# Patient Record
Sex: MALE | Race: WHITE | NOT HISPANIC OR LATINO | Employment: OTHER | URBAN - NONMETROPOLITAN AREA
[De-identification: names, ages, dates, MRNs, and addresses within clinical notes are randomized per-mention and may not be internally consistent; named-entity substitution may affect disease eponyms.]

---

## 2017-01-04 ENCOUNTER — OFFICE VISIT (OUTPATIENT)
Dept: PODIATRY | Facility: CLINIC | Age: 57
End: 2017-01-04

## 2017-01-04 VITALS — BODY MASS INDEX: 32.88 KG/M2 | WEIGHT: 270 LBS | HEIGHT: 76 IN

## 2017-01-04 DIAGNOSIS — M20.32 HALLUX HAMMERTOE, LEFT: ICD-10-CM

## 2017-01-04 DIAGNOSIS — L03.116 CELLULITIS OF LEFT LEG: ICD-10-CM

## 2017-01-04 DIAGNOSIS — M20.42 HAMMER TOE OF LEFT FOOT: ICD-10-CM

## 2017-01-04 DIAGNOSIS — M20.11 HALLUX VALGUS, RIGHT: ICD-10-CM

## 2017-01-04 DIAGNOSIS — L97.522 CHRONIC ULCER OF GREAT TOE OF LEFT FOOT WITH FAT LAYER EXPOSED (HCC): Primary | ICD-10-CM

## 2017-01-04 PROCEDURE — 11042 DBRDMT SUBQ TIS 1ST 20SQCM/<: CPT | Performed by: PODIATRIST

## 2017-01-04 PROCEDURE — 99214 OFFICE O/P EST MOD 30 MIN: CPT | Performed by: PODIATRIST

## 2017-01-04 RX ORDER — CLINDAMYCIN HYDROCHLORIDE 300 MG/1
600 CAPSULE ORAL 3 TIMES DAILY
Qty: 60 CAPSULE | Refills: 0 | Status: SHIPPED | OUTPATIENT
Start: 2017-01-04 | End: 2017-03-09 | Stop reason: HOSPADM

## 2017-01-04 NOTE — MR AVS SNAPSHOT
Emre Lisa   1/4/2017 8:30 AM   Office Visit    Dept Phone:  [phone]   Encounter #:  [id number]    Provider:  Marco A Thompson DPM   Department:  [facility] PODIATRY                Your Full Care Plan              Your Updated Medication List          This list is accurate as of: 1/4/17  9:08 AM.  Always use your most recent med list.                cephalexin 500 MG capsule   Commonly known as:  KEFLEX   Take 1 capsule by mouth 4 (Four) Times a Day.       doxycycline 100 MG capsule   Commonly known as:  VIBRAMYCIN   Take 1 capsule by mouth 2 (Two) Times a Day.       fluticasone 50 MCG/ACT nasal spray   Commonly known as:  FLONASE       FLUVIRIN 0.5 ML suspension prefilled syringe injection   Generic drug:  Influenza Vac Typ A&B Surf Ant       glimepiride 2 MG tablet   Commonly known as:  AMARYL       magnesium oxide 400 (241.3 MG) MG tablet tablet   Commonly known as:  MAGOX       metFORMIN 1000 MG tablet   Commonly known as:  GLUCOPHAGE       metoprolol tartrate 25 MG tablet   Commonly known as:  LOPRESSOR       MULTAQ 400 MG tablet   Generic drug:  dronedarone       mupirocin 2 % ointment   Commonly known as:  BACTROBAN       ONE TOUCH ULTRA TEST test strip   Generic drug:  glucose blood       valsartan 160 MG tablet   Commonly known as:  DIOVAN               Instructions     None    Patient Instructions History      Upcoming Appointments     Visit Type Date Time Department    POST-OP 1/4/2017  8:30 AM [facility]    POST-OP 1/11/2017  8:00 AM [facility]      [employer] Signup     [facility] allows you to send messages to your doctor, view your test results, renew your prescriptions, schedule appointments, and more. To sign up, go to [employer] and click on the Sign Up Now link in the New User? box. Enter your [employer] Activation Code exactly as it appears below along with the last four digits of your Social  "Security Number and your Date of Birth () to complete the sign-up process. If you do not sign up before the expiration date, you must request a new code.    [facility] Activation Code: [email or web address]  Expires: 2017  9:21 AM    If you have questions, you can email [email or web address] or call [phone] to talk to our [facility] staff. Remember, [facility] is NOT to be used for urgent needs. For medical emergencies, dial 911.               Other Info from Your Visit           Your Appointments     2017  8:00 AM CST   Post-Op with Marco A Thompson DPM   [facility] (--)    [address]  [facility] [place] [address] [place] [id number] [phone]              Allergies     Januvia [Sitagliptin]      Lipitor [Atorvastatin]      Peanut-containing Drug Products      Penicillins      Sulfa Antibiotics        Reason for Visit     Left Foot - Wound Check, Follow-up     Right Foot - post op karyn           Vital Signs     Height Weight Body Mass Index Smoking Status          76\" (193 cm) 270 lb (122 kg) 32.87 kg/m2 Never Smoker          "

## 2017-01-04 NOTE — PROGRESS NOTES
Emre Lisa  1960  56 y.o. male   Patient presents today for a post op recheck of his right foot and wound care recheck of his left great toe.   Patient has been on antibiotics since 12/30/2016 1/4/2017  Chief Complaint   Patient presents with   • Left Foot - Wound Check, Follow-up   • Right Foot - post op karyn           History of Present Illness    Patient presents to clinic today for follow-up.  Previous amputation site on the right foot is completely healed with no issues.  He continues to dress the ulcer on his left great toe daily.    It is not painful.  He has a new complaint today of cellulitis on his left leg. This is a ongoing problem for this patient. States that he has had cellulitis to his legs off and on for the last couple of years. He has no other pedal complaints at this time.         No past medical history on file.      No past surgical history on file.      No family history on file.      Social History     Social History   • Marital status:      Spouse name: N/A   • Number of children: N/A   • Years of education: N/A     Occupational History   • Not on file.     Social History Main Topics   • Smoking status: Never Smoker   • Smokeless tobacco: Not on file   • Alcohol use Yes      Comment: socially   • Drug use: No   • Sexual activity: Not on file     Other Topics Concern   • Not on file     Social History Narrative         Current Outpatient Prescriptions   Medication Sig Dispense Refill   • cephalexin (KEFLEX) 500 MG capsule Take 1 capsule by mouth 4 (Four) Times a Day. 40 capsule 0   • doxycycline (VIBRAMYCIN) 100 MG capsule Take 1 capsule by mouth 2 (Two) Times a Day. 20 capsule 0   • fluticasone (FLONASE) 50 MCG/ACT nasal spray INSTILL 2 SPRAYS IN NOSTRILS TWICE DAILY AS NEEDED FOR RHINITIS  3   • FLUVIRIN 0.5 ML suspension prefilled syringe injection      • glimepiride (AMARYL) 2 MG tablet      • magnesium oxide (MAGOX) 400 (241.3 MG) MG tablet tablet TAKE 1  "TABLET BY MOUTH 2 (TWO) TIMES DAILY.  1   • metFORMIN (GLUCOPHAGE) 1000 MG tablet TAKE 1 TABLET BY MOUTH 2 (TWO) TIMES DAILY WITH MEALS. INDICATIONS: TYPE 2 DIABETES MELLITUS  0   • metoprolol tartrate (LOPRESSOR) 25 MG tablet TAKE 1 TABLET BY MOUTH 2 (TWO) TIMES DAILY.  1   • MULTAQ 400 MG tablet TAKE 1 TABLET BY MOUTH EVERY DAY  1   • mupirocin (BACTROBAN) 2 % ointment      • ONE TOUCH ULTRA TEST test strip USE TO TEST BLOOD SUGAR THREE TIMES A DAY  1   • valsartan (DIOVAN) 160 MG tablet TAKE 1 TABLET BY MOUTH EVERY DAY  0   • clindamycin (CLEOCIN) 300 MG capsule Take 2 capsules by mouth 3 (Three) Times a Day. 60 capsule 0     No current facility-administered medications for this visit.            OBJECTIVE    Visit Vitals   • Ht 76\" (193 cm)   • Wt 270 lb (122 kg)   • BMI 32.87 kg/m2       Review of Systems   Constitutional: Negative for chills and fever.   Cardiovascular: Negative for chest pain.   Gastrointestinal: Negative for constipation, diarrhea, nausea and vomiting.   Skin: ulcer left great toe, cellulitis left leg  Musculoskeletal: negative foot pain      Constitutional: well developed, well nourished    HEENT: Normocephalic and atraumatic, normal hearing    Respiratory: Non labored respirations noted    Cardiovascular:    DP pulses palpable . Non palpable PT pulses  CFT brisk  to all digits  Skin temp is warm to warm from proximal tibia to distal digits  Pedal hair growth absent.   Erythema noted to the middle 1/3 of the left anterior leg.     Musculoskeletal:  Completely healed incision site noted to right fourth digit amputation site  Amputated second fourth and fifth digits on the right foot.  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  Contracted right third digit  Hallux malleus noted left.  Rigidly contracted digits 2 through 5 left  No pain on palpation noted    Rectus foot type     Dermatological:   Ulcer noted to the distal tip of the left hallux.  Ulcer measures 0.2 x 0.2 x 0.2 cm.  There is a " hyperkeratotic rim.  Does not probe to bone.  No foul odor or purulent drainage noted.  No periwound erythema.  Webspaces 1-4 bilateral are clean, dry and intact.   No subcutaneous nodules or masses noted        Neurological:   Protective sensation absent  Sensation intact to light touch        Psychiatric: A&O x 3 with normal mood and affect. NAD.           Procedures        ASSESSMENT AND PLAN    Emre was seen today for wound check, follow-up and post op karyn.    Diagnoses and all orders for this visit:    Chronic ulcer of great toe of left foot with fat layer exposed    Hallux valgus, right    Hallux hammertoe, left    Hammer toe of left foot    Cellulitis of left leg    Other orders  -     clindamycin (CLEOCIN) 300 MG capsule; Take 2 capsules by mouth 3 (Three) Times a Day.    Rx for clindamycin for cellulitis  Continue keflex  Ulcer to left great toe sharply debrided down to healthy bleeding tissue with a #15 blade.  Post-debridement measurements were 0.3 x 0.3 x 0.3 cm.  Healthy bleeding tissue noted.  Ulcer dressed with medihoney and a band-aid  Plan is for IPJ fusion of the left hallux in the future vs flexor tenotomy of the left hallux  Patient is in agreement with the current treatment plan.  All his questions were answered to his satisfaction.  Return to clinic in 1 week    Marco A Thompson DPM  1/5/2017  2:02 PM              This document has been electronically signed by Marco A Thompson DPM on January 5, 2017 2:02 PM

## 2017-01-18 ENCOUNTER — OFFICE VISIT (OUTPATIENT)
Dept: PODIATRY | Facility: CLINIC | Age: 57
End: 2017-01-18

## 2017-01-18 VITALS — BODY MASS INDEX: 32.88 KG/M2 | HEIGHT: 76 IN | WEIGHT: 270 LBS

## 2017-01-18 DIAGNOSIS — M20.32 HALLUX HAMMERTOE, LEFT: ICD-10-CM

## 2017-01-18 DIAGNOSIS — L97.522 CHRONIC ULCER OF GREAT TOE OF LEFT FOOT WITH FAT LAYER EXPOSED (HCC): Primary | ICD-10-CM

## 2017-01-18 DIAGNOSIS — M20.42 HAMMER TOE OF LEFT FOOT: ICD-10-CM

## 2017-01-18 DIAGNOSIS — M20.11 HALLUX VALGUS, RIGHT: ICD-10-CM

## 2017-01-18 DIAGNOSIS — L03.116 CELLULITIS OF LEFT LEG: ICD-10-CM

## 2017-01-18 PROCEDURE — 11042 DBRDMT SUBQ TIS 1ST 20SQCM/<: CPT | Performed by: PODIATRIST

## 2017-01-18 PROCEDURE — 99213 OFFICE O/P EST LOW 20 MIN: CPT | Performed by: PODIATRIST

## 2017-01-18 RX ORDER — CEPHALEXIN 500 MG/1
500 CAPSULE ORAL 4 TIMES DAILY
Qty: 40 CAPSULE | Refills: 0 | Status: SHIPPED | OUTPATIENT
Start: 2017-01-18 | End: 2017-02-15 | Stop reason: SDUPTHER

## 2017-01-18 NOTE — PROGRESS NOTES
Emre Lisa  1960  56 y.o. male   BS: 77 this morning per patient.       01/18/17    Chief Complaint   Patient presents with   • Left Foot - Follow-up, Wound Check   • Right Foot - Follow-up, Post-op           History of Present Illness    Patient presents to clinic today for follow-up of his great toe ulcer.  Has been dressing it daily with medihoney and a Band-Aid.  He has no pain.  States that he has been off antibiotics for a couple days now and he believes the cellulitis to be coming back to his anterior shin.  He denies nausea, vomiting, fever, chills, night sweats or chest pain.      No past medical history on file.      No past surgical history on file.      No family history on file.      Social History     Social History   • Marital status:      Spouse name: N/A   • Number of children: N/A   • Years of education: N/A     Occupational History   • Not on file.     Social History Main Topics   • Smoking status: Never Smoker   • Smokeless tobacco: Not on file   • Alcohol use Yes      Comment: socially   • Drug use: No   • Sexual activity: Not on file     Other Topics Concern   • Not on file     Social History Narrative         Current Outpatient Prescriptions   Medication Sig Dispense Refill   • fluticasone (FLONASE) 50 MCG/ACT nasal spray INSTILL 2 SPRAYS IN NOSTRILS TWICE DAILY AS NEEDED FOR RHINITIS  3   • FLUVIRIN 0.5 ML suspension prefilled syringe injection      • glimepiride (AMARYL) 2 MG tablet      • magnesium oxide (MAGOX) 400 (241.3 MG) MG tablet tablet TAKE 1 TABLET BY MOUTH 2 (TWO) TIMES DAILY.  1   • metFORMIN (GLUCOPHAGE) 1000 MG tablet TAKE 1 TABLET BY MOUTH 2 (TWO) TIMES DAILY WITH MEALS. INDICATIONS: TYPE 2 DIABETES MELLITUS  0   • metoprolol tartrate (LOPRESSOR) 25 MG tablet TAKE 1 TABLET BY MOUTH 2 (TWO) TIMES DAILY.  1   • MULTAQ 400 MG tablet TAKE 1 TABLET BY MOUTH EVERY DAY  1   • mupirocin (BACTROBAN) 2 % ointment      • ONE TOUCH ULTRA TEST test strip USE TO TEST  "BLOOD SUGAR THREE TIMES A DAY  1   • valsartan (DIOVAN) 160 MG tablet TAKE 1 TABLET BY MOUTH EVERY DAY  0   • cephalexin (KEFLEX) 500 MG capsule Take 1 capsule by mouth 4 (Four) Times a Day. 40 capsule 0   • clindamycin (CLEOCIN) 300 MG capsule Take 2 capsules by mouth 3 (Three) Times a Day. 60 capsule 0   • doxycycline (VIBRAMYCIN) 100 MG capsule Take 1 capsule by mouth 2 (Two) Times a Day. 20 capsule 0     No current facility-administered medications for this visit.            OBJECTIVE    Visit Vitals   • Ht 76\" (193 cm)   • Wt 270 lb (122 kg)   • BMI 32.87 kg/m2       Review of Systems   Constitutional: Negative for chills and fever.   Cardiovascular: Negative for chest pain.   Gastrointestinal: Negative for constipation, diarrhea, nausea and vomiting.   Skin: ulcer left great toe, cellulitis left leg  Musculoskeletal: negative foot pain      Constitutional: well developed, well nourished    HEENT: Normocephalic and atraumatic, normal hearing    Respiratory: Non labored respirations noted    Cardiovascular:    DP pulses palpable . Non palpable PT pulses  CFT brisk  to all digits  Skin temp is warm to warm from proximal tibia to distal digits  Pedal hair growth absent.   Erythema noted to the middle 1/3 of the left anterior leg.     Musculoskeletal:  Completely healed incision site noted to right fourth digit amputation site  Amputated second fourth and fifth digits on the right foot.  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  Contracted right third digit  Hallux malleus noted left.  Rigidly contracted digits 2 through 5 left  No pain on palpation noted    Rectus foot type     Dermatological:   Ulcer noted to the distal tip of the left hallux.  Ulcer measures 0.2 x 0.1 x 0.2 cm.  There is a hyperkeratotic rim.  Does not probe to bone.  No foul odor or purulent drainage noted.  No periwound erythema.  Webspaces 1-4 bilateral are clean, dry and intact.   No subcutaneous nodules or masses noted        Neurological: "   Protective sensation absent  Sensation intact to light touch        Psychiatric: A&O x 3 with normal mood and affect. NAD.           Procedures        ASSESSMENT AND PLAN    Emre was seen today for follow-up, wound check, follow-up and post-op.    Diagnoses and all orders for this visit:    Chronic ulcer of great toe of left foot with fat layer exposed    Hallux valgus, right    Hallux hammertoe, left    Hammer toe of left foot    Cellulitis of left leg    Other orders  -     cephalexin (KEFLEX) 500 MG capsule; Take 1 capsule by mouth 4 (Four) Times a Day.      Rx for keflex  Ulcer to left great toe sharply debrided down to healthy bleeding tissue with a #15 blade.  Post-debridement measurements were 0.4 x 0.1 x 0.2 cm.  Healthy bleeding tissue noted.  Ulcer dressed with medihoney and a band-aid  Plan is for flexor tenotomy next week  Lengthy discussion was held patient regarding long-term surgical plan for the right foot.  Patient is in agreement with the current treatment plan.  All his questions were answered to his satisfaction.  Return to clinic in 1 week              This document has been electronically signed by Marco A Thompson DPM on January 18, 2017 9:08 AM

## 2017-02-14 ENCOUNTER — OFFICE VISIT (OUTPATIENT)
Dept: PODIATRY | Facility: CLINIC | Age: 57
End: 2017-02-14

## 2017-02-14 VITALS — BODY MASS INDEX: 32.88 KG/M2 | HEIGHT: 76 IN | WEIGHT: 270 LBS

## 2017-02-14 DIAGNOSIS — L97.512 ULCER OF GREAT TOE, RIGHT, WITH FAT LAYER EXPOSED (HCC): ICD-10-CM

## 2017-02-14 DIAGNOSIS — L97.522 CHRONIC ULCER OF GREAT TOE OF LEFT FOOT WITH FAT LAYER EXPOSED (HCC): Primary | ICD-10-CM

## 2017-02-14 DIAGNOSIS — L97.512 ULCER OF RIGHT FOOT WITH FAT LAYER EXPOSED (HCC): ICD-10-CM

## 2017-02-14 PROCEDURE — 99214 OFFICE O/P EST MOD 30 MIN: CPT | Performed by: PODIATRIST

## 2017-02-14 PROCEDURE — 11042 DBRDMT SUBQ TIS 1ST 20SQCM/<: CPT | Performed by: PODIATRIST

## 2017-02-14 NOTE — PROGRESS NOTES
Emre Lisa  1960  56 y.o. male       Patient presents today with a recheck of old ulcers and evaluation of new open areas on bilateral feet.      2/14/17    Chief Complaint   Patient presents with   • Left Foot - Wound Check, Foot Ulcer   • Right Foot - Wound Check, Foot Ulcer           History of Present Illness    Patient presents to clinic today for follow-up of his left great toe ulcer.  Has been dressing it daily with medihoney and a Band-Aid.  He states that he has new wounds which have opened up in the last couple of days.  It has been 4 weeks since I last saw this patient.  His last visit he was instructed to follow up in 1 week.  He states that he does not know how they have opened up on.  He is still in the same shoes that he has been wearing.  He has been applying the medihoney and DSD to the new sores.  He also states that the cellulitis is back on his legs.  He has no other pedal complaints.    No past medical history on file.      No past surgical history on file.      No family history on file.      Social History     Social History   • Marital status:      Spouse name: N/A   • Number of children: N/A   • Years of education: N/A     Occupational History   • Not on file.     Social History Main Topics   • Smoking status: Never Smoker   • Smokeless tobacco: Not on file   • Alcohol use Yes      Comment: socially   • Drug use: No   • Sexual activity: Not on file     Other Topics Concern   • Not on file     Social History Narrative         Current Outpatient Prescriptions   Medication Sig Dispense Refill   • cephalexin (KEFLEX) 500 MG capsule Take 1 capsule by mouth 4 (Four) Times a Day. 40 capsule 0   • clindamycin (CLEOCIN) 300 MG capsule Take 2 capsules by mouth 3 (Three) Times a Day. 60 capsule 0   • doxycycline (VIBRAMYCIN) 100 MG capsule Take 1 capsule by mouth 2 (Two) Times a Day. 20 capsule 0   • fluticasone (FLONASE) 50 MCG/ACT nasal spray INSTILL 2 SPRAYS IN NOSTRILS TWICE  "DAILY AS NEEDED FOR RHINITIS  3   • FLUVIRIN 0.5 ML suspension prefilled syringe injection      • glimepiride (AMARYL) 2 MG tablet      • magnesium oxide (MAGOX) 400 (241.3 MG) MG tablet tablet TAKE 1 TABLET BY MOUTH 2 (TWO) TIMES DAILY.  1   • metFORMIN (GLUCOPHAGE) 1000 MG tablet TAKE 1 TABLET BY MOUTH 2 (TWO) TIMES DAILY WITH MEALS. INDICATIONS: TYPE 2 DIABETES MELLITUS  0   • metoprolol tartrate (LOPRESSOR) 25 MG tablet TAKE 1 TABLET BY MOUTH 2 (TWO) TIMES DAILY.  1   • MULTAQ 400 MG tablet TAKE 1 TABLET BY MOUTH EVERY DAY  1   • mupirocin (BACTROBAN) 2 % ointment      • ONE TOUCH ULTRA TEST test strip USE TO TEST BLOOD SUGAR THREE TIMES A DAY  1   • valsartan (DIOVAN) 160 MG tablet TAKE 1 TABLET BY MOUTH EVERY DAY  0     No current facility-administered medications for this visit.            OBJECTIVE    Visit Vitals   • Ht 76\" (193 cm)   • Wt 270 lb (122 kg)   • BMI 32.87 kg/m2       Review of Systems   Constitutional: Negative for chills and fever.   Cardiovascular: Negative for chest pain.   Gastrointestinal: Negative for constipation, diarrhea, nausea and vomiting.   Skin: ulcer left great toe and right foot, cellulitis left leg  Musculoskeletal: negative foot pain      Constitutional: well developed, well nourished    HEENT: Normocephalic and atraumatic, normal hearing    Respiratory: Non labored respirations noted    Cardiovascular:    DP pulses palpable . Non palpable PT pulses  CFT brisk  to all digits  Skin temp is warm to warm from proximal tibia to distal digits  Pedal hair growth absent.   Erythema noted to the middle 1/3 of the left anterior leg.     Musculoskeletal:  Completely healed incision site noted to right fourth digit amputation site  Amputated second fourth and fifth digits on the right foot.  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  Contracted right third digit  Hallux malleus noted left.  Rigidly contracted digits 2 through 5 left  No pain on palpation noted    Rectus foot type "     Dermatological:   #1 Ulcer noted to the distal tip of the left hallux.  Ulcer measures 0.1 x 0.1 x 0.1 cm.  There is a hyperkeratotic rim.  Does not probe to bone.  No foul odor or purulent drainage noted.  No periwound erythema.  #2 New ulcer noted to the plantar aspect of the right foot sub-third and fourth metatarsal heads.  Ulcer measures approximately 1 cm x 1 cm 0.2 cm deep.  There is a hyperkeratotic border.  He does not probe to bone.  There is no foul odor purulent drainage noted.  No surrounding erythema.  #3 ulcer noted to the medial aspect of the right first metatarsal phalangeal joint.  Ulcer measures approximately 0.5 x 0.5 x 0.2 cm.  There is no foul odor purulent drainage.  No surrounding erythema.  It does not probe to bone.  Webspaces 1-4 bilateral are clean, dry and intact.   No subcutaneous nodules or masses noted        Neurological:   Protective sensation absent  Sensation intact to light touch        Psychiatric: A&O x 3 with normal mood and affect. NAD.           Procedures        ASSESSMENT AND PLAN    Emre was seen today for wound check, foot ulcer, wound check and foot ulcer.    Diagnoses and all orders for this visit:    Chronic ulcer of great toe of left foot with fat layer exposed    Ulcer of right foot with fat layer exposed    Ulcer of great toe, right, with fat layer exposed    Other orders  -     cephalexin (KEFLEX) 500 MG capsule; Take 1 capsule by mouth 4 (Four) Times a Day.        Rx for keflex for cellulitis  Ulcer to left great toe sharply debrided down to healthy bleeding tissue with a #15 blade.  Post-debridement measurements were 0.4 x 0.1 x 0.2 cm.  Healthy bleeding tissue noted.  Ulcer to right plantar foot debrided down to healthy bleeding tissue with a #15 blade.  Post-debridement measurements are 1.1 cm x 1.1 cm x 0.2 cm.  The bases are granular.  Ulcer to the right metatarsophalangeal joint debridement down to healthy bleeding tissue with a #15 blade.   Post-debridement measurements are 0.6 x 0.6 x 0.2 cm.  Ulcers dressed with medihoney and a band-aid  Lengthy discussion was held patient regarding management of his ulcers.  I advised the patient that a transmetatarsal amputation to the right foot with a posterior muscle lengthening would be in his best interest.  The patient is in agreement on this matter.  However, he is worried about being down and out of work.  Dispensed offloaded surgical shoe  Patient is in agreement with the current treatment plan.  All his questions were answered to his satisfaction.  Return to clinic in 1 week      I specifically spent 30 minutes face-to-face with this patient treating and counseling him.            This document has been electronically signed by Marco A Thompson DPM on February 15, 2017 5:42 PM

## 2017-02-15 RX ORDER — CEPHALEXIN 500 MG/1
500 CAPSULE ORAL 4 TIMES DAILY
Qty: 40 CAPSULE | Refills: 0 | Status: SHIPPED | OUTPATIENT
Start: 2017-02-15 | End: 2017-03-09 | Stop reason: HOSPADM

## 2017-02-16 RX ORDER — CEPHALEXIN 500 MG/1
CAPSULE ORAL
Qty: 40 CAPSULE | Refills: 0 | OUTPATIENT
Start: 2017-02-16

## 2017-02-21 ENCOUNTER — OFFICE VISIT (OUTPATIENT)
Dept: PODIATRY | Facility: CLINIC | Age: 57
End: 2017-02-21

## 2017-02-21 VITALS — HEIGHT: 76 IN | WEIGHT: 270 LBS | BODY MASS INDEX: 32.88 KG/M2

## 2017-02-21 DIAGNOSIS — L97.522 CHRONIC ULCER OF GREAT TOE OF LEFT FOOT WITH FAT LAYER EXPOSED (HCC): ICD-10-CM

## 2017-02-21 DIAGNOSIS — R09.89 DECREASED PEDAL PULSES: ICD-10-CM

## 2017-02-21 DIAGNOSIS — L97.512 ULCER OF GREAT TOE, RIGHT, WITH FAT LAYER EXPOSED (HCC): ICD-10-CM

## 2017-02-21 DIAGNOSIS — L97.512 ULCER OF RIGHT FOOT WITH FAT LAYER EXPOSED (HCC): Primary | ICD-10-CM

## 2017-02-21 PROCEDURE — 99214 OFFICE O/P EST MOD 30 MIN: CPT | Performed by: PODIATRIST

## 2017-02-21 RX ORDER — DOXYCYCLINE HYCLATE 100 MG/1
100 CAPSULE ORAL 2 TIMES DAILY
Qty: 20 CAPSULE | Refills: 0 | Status: SHIPPED | OUTPATIENT
Start: 2017-02-21 | End: 2017-03-09 | Stop reason: HOSPADM

## 2017-02-21 RX ORDER — SODIUM CHLORIDE 0.9 % (FLUSH) 0.9 %
1-10 SYRINGE (ML) INJECTION AS NEEDED
Status: CANCELLED | OUTPATIENT
Start: 2017-02-21

## 2017-02-21 NOTE — PROGRESS NOTES
Emre Lisa  1960  56 y.o. male   BS 80 this morning per patient.       02/21/17      Chief Complaint   Patient presents with   • Left Foot - Follow-up, Wound Check, Foot Ulcer   • Right Foot - Bunions, Wound Check, Foot Ulcer           History of Present Illness    Patient presents to clinic today for follow-up of his bilateral foot ulcers.  He states that he is ready to have surgery on his right foot.  He has a few questions about the procedure and recovery time. He has been following instructions regarding dressing changes to foot ulcers. His cellulitis has flared up again on his left leg.     No past medical history on file.      No past surgical history on file.      No family history on file.      Social History     Social History   • Marital status:      Spouse name: N/A   • Number of children: N/A   • Years of education: N/A     Occupational History   • Not on file.     Social History Main Topics   • Smoking status: Never Smoker   • Smokeless tobacco: Not on file   • Alcohol use Yes      Comment: socially   • Drug use: No   • Sexual activity: Not on file     Other Topics Concern   • Not on file     Social History Narrative         Current Outpatient Prescriptions   Medication Sig Dispense Refill   • cephalexin (KEFLEX) 500 MG capsule Take 1 capsule by mouth 4 (Four) Times a Day. 40 capsule 0   • fluticasone (FLONASE) 50 MCG/ACT nasal spray INSTILL 2 SPRAYS IN NOSTRILS TWICE DAILY AS NEEDED FOR RHINITIS  3   • FLUVIRIN 0.5 ML suspension prefilled syringe injection      • glimepiride (AMARYL) 2 MG tablet      • magnesium oxide (MAGOX) 400 (241.3 MG) MG tablet tablet TAKE 1 TABLET BY MOUTH 2 (TWO) TIMES DAILY.  1   • metFORMIN (GLUCOPHAGE) 1000 MG tablet TAKE 1 TABLET BY MOUTH 2 (TWO) TIMES DAILY WITH MEALS. INDICATIONS: TYPE 2 DIABETES MELLITUS  0   • metoprolol tartrate (LOPRESSOR) 25 MG tablet TAKE 1 TABLET BY MOUTH 2 (TWO) TIMES DAILY.  1   • MULTAQ 400 MG tablet TAKE 1 TABLET BY MOUTH  "EVERY DAY  1   • mupirocin (BACTROBAN) 2 % ointment      • ONE TOUCH ULTRA TEST test strip USE TO TEST BLOOD SUGAR THREE TIMES A DAY  1   • valsartan (DIOVAN) 160 MG tablet TAKE 1 TABLET BY MOUTH EVERY DAY  0   • clindamycin (CLEOCIN) 300 MG capsule Take 2 capsules by mouth 3 (Three) Times a Day. 60 capsule 0   • doxycycline (VIBRAMYCIN) 100 MG capsule Take 1 capsule by mouth 2 (Two) Times a Day. 20 capsule 0     No current facility-administered medications for this visit.            OBJECTIVE    Visit Vitals   • Ht 76\" (193 cm)   • Wt 270 lb (122 kg)   • BMI 32.87 kg/m2       Review of Systems   Constitutional: Negative for chills and fever.   Cardiovascular: Negative for chest pain.   Gastrointestinal: Negative for constipation, diarrhea, nausea and vomiting.   Skin: ulcer left great toe and right foot, cellulitis left leg  Musculoskeletal: negative foot pain      Constitutional: well developed, well nourished    HEENT: Normocephalic and atraumatic, normal hearing    Respiratory: Non labored respirations noted    Cardiovascular:    DP pulses palpable . Non palpable PT pulses  CFT brisk  to all digits  Skin temp is warm to warm from proximal tibia to distal digits  Pedal hair growth absent.   Erythema noted to the middle 1/3 of the left anterior leg.     Musculoskeletal:  Completely healed incision site noted to right fourth digit amputation site  Amputated second fourth and fifth digits on the right foot.  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  Contracted right third digit  Hallux malleus noted left.  Rigidly contracted digits 2 through 5 left  No pain on palpation noted    Rectus foot type     Dermatological:   #1 Ulcer noted to the distal tip of the left hallux.  Ulcer measures 0.1 x 0.1 x 0.1 cm.  There is a hyperkeratotic rim.  Does not probe to bone.  No foul odor or purulent drainage noted.  No periwound erythema.  #2 New ulcer noted to the plantar aspect of the right foot sub-third and fourth metatarsal " heads.  Ulcer measures approximately 0.6 cm x 0.6 cm 0.2 cm deep.  There is a hyperkeratotic border.  He does not probe to bone.  There is no foul odor purulent drainage noted.  No surrounding erythema.  #3 ulcer noted to the medial aspect of the right first metatarsal phalangeal joint.  Ulcer measures approximately 0.5 x 0.5 x 0.2 cm.  There is no foul odor purulent drainage.  No surrounding erythema.  It does not probe to bone.  Webspaces 1-4 bilateral are clean, dry and intact.   No subcutaneous nodules or masses noted        Neurological:   Protective sensation absent  Sensation intact to light touch        Psychiatric: A&O x 3 with normal mood and affect. NAD.           Procedures        ASSESSMENT AND PLAN    Emre was seen today for follow-up, wound check, foot ulcer, bunions, wound check and foot ulcer.    Diagnoses and all orders for this visit:    Ulcer of right foot with fat layer exposed  -     Case Request; Standing  -     sodium chloride 0.9 % flush 1-10 mL; Infuse 1-10 mL into a venous catheter As Needed for line care.  -     Comprehensive metabolic panel; Future  -     CBC and Differential; Future  -     Sedimentation rate; Future  -     C-reactive protein; Future  -     vancomycin (VANCOCIN) 2,000 mg in sodium chloride 0.9 % 250 mL IVPB; Infuse 2,000 mg into a venous catheter 1 (One) Time.  -     Case Request    Ulcer of great toe, right, with fat layer exposed    Chronic ulcer of great toe of left foot with fat layer exposed    Other orders  -     Obtain informed consent  -     Follow Anesthesia Guidelines / Standing Orders; Standing  -     Verify NPO Status; Standing  -     Obtain informed consent (if not collected inpatient or PAT); Standing  -     Notify Physician - Standard; Standing  -     Insert Peripheral IV; Standing  -     Saline Lock & Maintain IV Access; Standing  -     Follow Anesthesia Guidelines / Standing Orders; Future  -     doxycycline (VIBRAMYCIN) 100 MG capsule; Take 1 capsule  by mouth 2 (Two) Times a Day.        Rx for doxycycline for cellulitis  Ulcers dressed with medihoney and a band-aid  Lengthy discussion was held patient regarding management of his ulcers including surgical intervention. Patient has agreed to surgical intervention. Plan is for a TMA RLE with a gastroc recession RLE. Risks and benefits of the procedure discussed with the patient. No guarantees were given or implied regarding the outcome of the procedure  Continue WBAT in surgical shoe on the right  All questions were answered and the patient is in agreement with the current treatment plan.  Return to clinic after surgery                This document has been electronically signed by Marco A Thompson DPM on February 21, 2017 12:03 PM

## 2017-02-22 ENCOUNTER — HOSPITAL ENCOUNTER (OUTPATIENT)
Facility: HOSPITAL | Age: 57
Setting detail: HOSPITAL OUTPATIENT SURGERY
End: 2017-02-22
Attending: PODIATRIST | Admitting: PODIATRIST

## 2017-03-02 ENCOUNTER — ANESTHESIA (OUTPATIENT)
Dept: PERIOP | Facility: HOSPITAL | Age: 57
End: 2017-03-02

## 2017-03-02 ENCOUNTER — APPOINTMENT (OUTPATIENT)
Dept: GENERAL RADIOLOGY | Facility: HOSPITAL | Age: 57
End: 2017-03-02

## 2017-03-02 ENCOUNTER — HOSPITAL ENCOUNTER (INPATIENT)
Facility: HOSPITAL | Age: 57
LOS: 7 days | Discharge: HOME HEALTH | End: 2017-03-09
Attending: FAMILY MEDICINE | Admitting: FAMILY MEDICINE

## 2017-03-02 ENCOUNTER — ANESTHESIA EVENT (OUTPATIENT)
Dept: PERIOP | Facility: HOSPITAL | Age: 57
End: 2017-03-02

## 2017-03-02 DIAGNOSIS — Z89.431 STATUS POST TRANSMETATARSAL AMPUTATION OF RIGHT FOOT (HCC): Primary | ICD-10-CM

## 2017-03-02 DIAGNOSIS — Z74.09 IMPAIRED PHYSICAL MOBILITY: ICD-10-CM

## 2017-03-02 DIAGNOSIS — M86.171 ACUTE OSTEOMYELITIS OF RIGHT FOOT (HCC): ICD-10-CM

## 2017-03-02 PROBLEM — M86.9 FOOT OSTEOMYELITIS, RIGHT: Status: ACTIVE | Noted: 2017-03-02

## 2017-03-02 LAB
ALBUMIN SERPL-MCNC: 4.3 G/DL (ref 3.4–4.8)
ALBUMIN/GLOB SERPL: 1.3 G/DL (ref 1.1–1.8)
ALP SERPL-CCNC: 60 U/L (ref 38–126)
ALT SERPL W P-5'-P-CCNC: 36 U/L (ref 21–72)
ANION GAP SERPL CALCULATED.3IONS-SCNC: 13 MMOL/L (ref 5–15)
AST SERPL-CCNC: 31 U/L (ref 17–59)
BASOPHILS # BLD AUTO: 0.02 10*3/MM3 (ref 0–0.2)
BASOPHILS NFR BLD AUTO: 0.2 % (ref 0–2)
BILIRUB SERPL-MCNC: 0.8 MG/DL (ref 0.2–1.3)
BUN BLD-MCNC: 36 MG/DL (ref 7–21)
BUN/CREAT SERPL: 40.9 (ref 7–25)
CALCIUM SPEC-SCNC: 9 MG/DL (ref 8.4–10.2)
CHLORIDE SERPL-SCNC: 104 MMOL/L (ref 95–110)
CO2 SERPL-SCNC: 21 MMOL/L (ref 22–31)
CREAT BLD-MCNC: 0.88 MG/DL (ref 0.7–1.3)
DEPRECATED RDW RBC AUTO: 45.1 FL (ref 35.1–43.9)
EOSINOPHIL # BLD AUTO: 0.13 10*3/MM3 (ref 0–0.7)
EOSINOPHIL NFR BLD AUTO: 1.4 % (ref 0–7)
ERYTHROCYTE [DISTWIDTH] IN BLOOD BY AUTOMATED COUNT: 14.3 % (ref 11.5–14.5)
GFR SERPL CREATININE-BSD FRML MDRD: 90 ML/MIN/1.73 (ref 56–130)
GLOBULIN UR ELPH-MCNC: 3.3 GM/DL (ref 2.3–3.5)
GLUCOSE BLD-MCNC: 128 MG/DL (ref 60–100)
GLUCOSE BLDC GLUCOMTR-MCNC: 128 MG/DL (ref 70–130)
GLUCOSE BLDC GLUCOMTR-MCNC: 68 MG/DL (ref 70–130)
HCT VFR BLD AUTO: 37 % (ref 39–49)
HGB BLD-MCNC: 12.8 G/DL (ref 13.7–17.3)
HOLD SPECIMEN: NORMAL
IMM GRANULOCYTES # BLD: 0.02 10*3/MM3 (ref 0–0.02)
IMM GRANULOCYTES NFR BLD: 0.2 % (ref 0–0.5)
LYMPHOCYTES # BLD AUTO: 1.09 10*3/MM3 (ref 0.6–4.2)
LYMPHOCYTES NFR BLD AUTO: 11.8 % (ref 10–50)
MCH RBC QN AUTO: 29.6 PG (ref 26.5–34)
MCHC RBC AUTO-ENTMCNC: 34.6 G/DL (ref 31.5–36.3)
MCV RBC AUTO: 85.6 FL (ref 80–98)
MONOCYTES # BLD AUTO: 0.78 10*3/MM3 (ref 0–0.9)
MONOCYTES NFR BLD AUTO: 8.5 % (ref 0–12)
NEUTROPHILS # BLD AUTO: 7.17 10*3/MM3 (ref 2–8.6)
NEUTROPHILS NFR BLD AUTO: 77.9 % (ref 37–80)
PLATELET # BLD AUTO: 128 10*3/MM3 (ref 150–450)
PMV BLD AUTO: 10.9 FL (ref 8–12)
POTASSIUM BLD-SCNC: 4.4 MMOL/L (ref 3.5–5.1)
PROT SERPL-MCNC: 7.6 G/DL (ref 6.3–8.6)
RBC # BLD AUTO: 4.32 10*6/MM3 (ref 4.37–5.74)
SODIUM BLD-SCNC: 138 MMOL/L (ref 137–145)
WBC NRBC COR # BLD: 9.21 10*3/MM3 (ref 3.2–9.8)

## 2017-03-02 PROCEDURE — 82962 GLUCOSE BLOOD TEST: CPT

## 2017-03-02 PROCEDURE — 10060 I&D ABSCESS SIMPLE/SINGLE: CPT | Performed by: PODIATRIST

## 2017-03-02 PROCEDURE — 87205 SMEAR GRAM STAIN: CPT | Performed by: PODIATRIST

## 2017-03-02 PROCEDURE — 94799 UNLISTED PULMONARY SVC/PX: CPT

## 2017-03-02 PROCEDURE — 87040 BLOOD CULTURE FOR BACTERIA: CPT | Performed by: FAMILY MEDICINE

## 2017-03-02 PROCEDURE — 85025 COMPLETE CBC W/AUTO DIFF WBC: CPT | Performed by: FAMILY MEDICINE

## 2017-03-02 PROCEDURE — 0JBQ0ZZ EXCISION OF RIGHT FOOT SUBCUTANEOUS TISSUE AND FASCIA, OPEN APPROACH: ICD-10-PCS | Performed by: PODIATRIST

## 2017-03-02 PROCEDURE — 25010000002 PROPOFOL 10 MG/ML EMULSION: Performed by: ANESTHESIOLOGY

## 2017-03-02 PROCEDURE — 73630 X-RAY EXAM OF FOOT: CPT

## 2017-03-02 PROCEDURE — 94760 N-INVAS EAR/PLS OXIMETRY 1: CPT

## 2017-03-02 PROCEDURE — 87147 CULTURE TYPE IMMUNOLOGIC: CPT | Performed by: PODIATRIST

## 2017-03-02 PROCEDURE — 80053 COMPREHEN METABOLIC PANEL: CPT | Performed by: FAMILY MEDICINE

## 2017-03-02 PROCEDURE — 99232 SBSQ HOSP IP/OBS MODERATE 35: CPT | Performed by: PODIATRIST

## 2017-03-02 PROCEDURE — 25010000002 VANCOMYCIN PER 500 MG: Performed by: FAMILY MEDICINE

## 2017-03-02 PROCEDURE — 87070 CULTURE OTHR SPECIMN AEROBIC: CPT | Performed by: PODIATRIST

## 2017-03-02 PROCEDURE — 25010000002 ENOXAPARIN PER 10 MG: Performed by: FAMILY MEDICINE

## 2017-03-02 RX ORDER — PANTOPRAZOLE SODIUM 40 MG/1
40 TABLET, DELAYED RELEASE ORAL
Status: DISCONTINUED | OUTPATIENT
Start: 2017-03-03 | End: 2017-03-09 | Stop reason: HOSPADM

## 2017-03-02 RX ORDER — HYDROCODONE BITARTRATE AND ACETAMINOPHEN 7.5; 325 MG/1; MG/1
1 TABLET ORAL EVERY 6 HOURS PRN
Status: DISCONTINUED | OUTPATIENT
Start: 2017-03-02 | End: 2017-03-09 | Stop reason: HOSPADM

## 2017-03-02 RX ORDER — GABAPENTIN 300 MG/1
300 CAPSULE ORAL 4 TIMES DAILY
COMMUNITY
End: 2018-02-09

## 2017-03-02 RX ORDER — PANTOPRAZOLE SODIUM 40 MG/1
40 TABLET, DELAYED RELEASE ORAL
Status: DISCONTINUED | OUTPATIENT
Start: 2017-03-02 | End: 2017-03-02

## 2017-03-02 RX ORDER — SODIUM CHLORIDE 0.9 % (FLUSH) 0.9 %
1-10 SYRINGE (ML) INJECTION AS NEEDED
Status: DISCONTINUED | OUTPATIENT
Start: 2017-03-02 | End: 2017-03-09 | Stop reason: HOSPADM

## 2017-03-02 RX ORDER — ASPIRIN 81 MG/1
81 TABLET, CHEWABLE ORAL NIGHTLY
COMMUNITY
End: 2021-06-02

## 2017-03-02 RX ORDER — BUPIVACAINE HYDROCHLORIDE 5 MG/ML
INJECTION, SOLUTION EPIDURAL; INTRACAUDAL AS NEEDED
Status: DISCONTINUED | OUTPATIENT
Start: 2017-03-02 | End: 2017-03-09 | Stop reason: HOSPADM

## 2017-03-02 RX ORDER — PROPOFOL 10 MG/ML
VIAL (ML) INTRAVENOUS AS NEEDED
Status: DISCONTINUED | OUTPATIENT
Start: 2017-03-02 | End: 2017-03-02 | Stop reason: SURG

## 2017-03-02 RX ORDER — SODIUM CHLORIDE 9 MG/ML
INJECTION, SOLUTION INTRAVENOUS CONTINUOUS PRN
Status: DISCONTINUED | OUTPATIENT
Start: 2017-03-02 | End: 2017-03-02 | Stop reason: SURG

## 2017-03-02 RX ORDER — MORPHINE SULFATE 2 MG/ML
2 INJECTION, SOLUTION INTRAMUSCULAR; INTRAVENOUS
Status: DISCONTINUED | OUTPATIENT
Start: 2017-03-02 | End: 2017-03-02

## 2017-03-02 RX ORDER — VALSARTAN 80 MG/1
80 TABLET ORAL
Status: DISCONTINUED | OUTPATIENT
Start: 2017-03-02 | End: 2017-03-02

## 2017-03-02 RX ORDER — ACETAMINOPHEN 325 MG/1
650 TABLET ORAL EVERY 4 HOURS PRN
Status: DISCONTINUED | OUTPATIENT
Start: 2017-03-02 | End: 2017-03-09 | Stop reason: HOSPADM

## 2017-03-02 RX ORDER — MORPHINE SULFATE 2 MG/ML
2 INJECTION, SOLUTION INTRAMUSCULAR; INTRAVENOUS
Status: DISCONTINUED | OUTPATIENT
Start: 2017-03-02 | End: 2017-03-09 | Stop reason: HOSPADM

## 2017-03-02 RX ORDER — ACETAMINOPHEN 325 MG/1
650 TABLET ORAL EVERY 4 HOURS PRN
Status: DISCONTINUED | OUTPATIENT
Start: 2017-03-02 | End: 2017-03-02

## 2017-03-02 RX ORDER — VALSARTAN 40 MG/1
40 TABLET ORAL NIGHTLY
Status: DISCONTINUED | OUTPATIENT
Start: 2017-03-02 | End: 2017-03-09 | Stop reason: HOSPADM

## 2017-03-02 RX ORDER — ASCORBIC ACID 500 MG
500 TABLET ORAL NIGHTLY
COMMUNITY
End: 2023-03-22

## 2017-03-02 RX ORDER — FLUTICASONE PROPIONATE 50 MCG
1 SPRAY, SUSPENSION (ML) NASAL 2 TIMES DAILY
Status: DISCONTINUED | OUTPATIENT
Start: 2017-03-02 | End: 2017-03-09 | Stop reason: HOSPADM

## 2017-03-02 RX ORDER — SODIUM CHLORIDE 0.9 % (FLUSH) 0.9 %
1-10 SYRINGE (ML) INJECTION AS NEEDED
Status: DISCONTINUED | OUTPATIENT
Start: 2017-03-02 | End: 2017-03-02 | Stop reason: HOSPADM

## 2017-03-02 RX ADMIN — PROPOFOL 50 MG: 10 INJECTION, EMULSION INTRAVENOUS at 19:55

## 2017-03-02 RX ADMIN — SODIUM CHLORIDE: 9 INJECTION, SOLUTION INTRAVENOUS at 19:21

## 2017-03-02 RX ADMIN — METOPROLOL TARTRATE 12.5 MG: 25 TABLET ORAL at 22:20

## 2017-03-02 RX ADMIN — PROPOFOL 100 MG: 10 INJECTION, EMULSION INTRAVENOUS at 19:25

## 2017-03-02 RX ADMIN — VANCOMYCIN HYDROCHLORIDE 2.5 G: 500 INJECTION, POWDER, LYOPHILIZED, FOR SOLUTION INTRAVENOUS at 19:21

## 2017-03-02 RX ADMIN — ACETAMINOPHEN 650 MG: 325 TABLET ORAL at 22:20

## 2017-03-02 RX ADMIN — Medication 10 ML: at 18:35

## 2017-03-02 RX ADMIN — PROPOFOL 50 MG: 10 INJECTION, EMULSION INTRAVENOUS at 19:48

## 2017-03-02 RX ADMIN — VANCOMYCIN HYDROCHLORIDE 2500 MG: 500 INJECTION, POWDER, LYOPHILIZED, FOR SOLUTION INTRAVENOUS at 14:54

## 2017-03-02 RX ADMIN — PROPOFOL 50 MG: 10 INJECTION, EMULSION INTRAVENOUS at 19:32

## 2017-03-02 RX ADMIN — PROPOFOL 50 MG: 10 INJECTION, EMULSION INTRAVENOUS at 19:42

## 2017-03-02 RX ADMIN — ENOXAPARIN SODIUM 40 MG: 40 INJECTION SUBCUTANEOUS at 14:54

## 2017-03-02 NOTE — H&P
"Patient Care Team:  Jamaal Bravo MD as PCP - General     Chief complaint: \"Right foot/toe pain.\"     Subjective        History of Present Illness      Emre comes in today to [facility] for worsening right foot pain/swelling/drainage and difficulty walking. He has difficult to control T2 DM and h/o previous DFU/toe amputations. He was to have Right 4th toe amputation by Dr. Marco A Thompson ([facility]) in the next 2 weeks, and has recently been on Doxycycline for cellulitis. He has had multiple problems in the past with his left and right feet. He has had 5th toe (3/2016) and 2nd toe (in 2013) both by former [facility] Podiatrist (Dr. Tyler) for DFUs, and most recently has bene followed at [facility] for left DFUs, which are healing and will not require amputation. No problems in the past with any surgery/GETA. Patient can perform light housework (dusing, cleaning), walk 2-3 blocks and 1-2 flights of stairs before becoming SOA, but back to baseline after resting a few minutes. Good overall functional capacity (>4METs) without symptoms. Normal daily DM foot checks; wears indoor house shoes and tries to trim calluses/lotion feet. DFU R>L feet. Patient has DM shoes, but needs DM boots for work -- asking for Rx today. Still using Neurontin 300 mg up to QID for DPN. Needing refill today. He has NOT had fasting labs/Hgb A1c checked in the past 6+ months. His glucometer (one touch) shows avg  mg/dl fasting and <140 mg/dl 2 hr PP. Patient is a non-smoker. No CP, SOB, HA. A 10 point ROS was completed. Other than above pertinent positives, all systems negative.        Review of Systems   Constitutional: Positive for fever.   HENT: Negative.   Eyes: Negative.   Respiratory: Negative.   Cardiovascular: Negative.   Endocrine: Negative.   Genitourinary: Negative.   Musculoskeletal: Negative.   Skin: Positive for color change and wound.   Allergic/Immunologic: Negative.   Neurological: Negative. " "  Hematological: Negative.   Psychiatric/Behavioral: Negative.   All other systems reviewed and are negative.       Patient Active Problem List    Diagnosis    •  CAD (coronary artery disease)    •  Vitamin D deficiency    •  Chronic atrial fibrillation    •  Essential hypertension    •  Mixed hyperlipidemia    •  Allergic rhinitis    •  Diabetic peripheral neuropathy associated with type 2 diabetes mellitus    •  Type II diabetes mellitus, uncontrolled    •  Screening for prostate cancer    •  Screen for colon cancer    •  Erectile dysfunction associated with type 2 diabetes mellitus    •  Bilateral carpal tunnel syndrome    •  Preventative health care    •  Hospital discharge follow-up    •  History of amputation of lesser toe of right foot    •  Personal history of diabetic foot ulcer       Past Surgical History    Past Surgical History    Procedure  Laterality  Date    •  Tonsillectomy and adenoidectomy      •  Cardiac defibrillator placement            Family History    Family History    Problem  Relation  Age of Onset    •  Arthritis  Mother     •  Kidney disease  Mother     •  Arthritis  Father     •  Diabetes  Father     •  Heart disease  Father     •  High cholesterol  Father     •  Hypertension  Father     •  Stroke  Father          History    Substance Use Topics    •  Smoking status:  Never Smoker    •  Smokeless tobacco:  Never Used    •  Alcohol Use:  0.0 oz/week      0 Standard drinks or equivalent per week                  Prescriptions Prior to Admission       (Not in a hospital admission)     Allergies: Januvia [sitagliptin]; Lipitor [atorvastatin]; Peanut-containing drug products; Penicillins; and Sulfa antibiotics     Objective         Vital Signs          Vitals Recorded in This Encounter         3/2/2017 0919              BP: 124/64 mmHg     Pulse: 97     Temp: 98.2 °F (36.8 °C)     Temp src: Temporal     SpO2: 100 %     Weight: 283 lb (128.368 kg)     Height: 6' 3\" (1.905 m)     BMI " (Calculated): 35.4                Physical Exam   Constitutional: He is oriented to person, place, and time. He appears well-developed and well-nourished.   HENT:   Head: Normocephalic and atraumatic.   Right Ear: External ear normal.   Left Ear: External ear normal.   Nose: Nose normal.   Mouth/Throat: Oropharynx is clear and moist. No oropharyngeal exudate.   Eyes: Conjunctivae and EOM are normal. Pupils are equal, round, and reactive to light. Right eye exhibits no discharge. Left eye exhibits no discharge. No scleral icterus.   Neck: Normal range of motion. Neck supple. No tracheal deviation present. No thyromegaly present.   Cardiovascular: Normal rate, regular rhythm and normal heart sounds. Exam reveals no gallop and no friction rub.   No murmur heard.  Pulmonary/Chest: Effort normal and breath sounds normal. No respiratory distress. He has no wheezes. He has no rales. He exhibits no tenderness.   Abdominal: Soft. Bowel sounds are normal. He exhibits no mass. There is no tenderness. There is no rebound and no guarding.   Genitourinary:   Genitourinary Comments: ENDER deferred; no suprapubic/CVA tenderness.    Musculoskeletal: He exhibits edema, tenderness and deformity.   Right foot: Marked erythema and edema lateral aspect forefoot also involving 1st and 3rd toes. Second, 4th and 5th toes absent. Deep ulcer plantar surface in the area of the heads of the 4th and 5th metatarsals. Draining serosanguineous material. Also draining ulcer over 1st MTP. Marked valgus deformity great toe. No ankle or distal leg involvement    Lymphadenopathy:   He has no cervical adenopathy.   Neurological: He is alert and oriented to person, place, and time. He displays normal reflexes. No cranial nerve deficit. Coordination normal.   Skin: Skin is warm and dry. There is erythema.   See above   Nursing note and vitals reviewed.        Results Review:     Right foot x-ray from [facility] 3/2/2017:  3 views the right foot are reviewed.  The patient demonstrates evidence prior 8 dictation of the second, fourth, and fifth digit at the level of the metatarsophalangeal joint. There is marked soft tissue swelling noted in the forefoot laterally. There is   air within the soft tissues and there are erosive changes around the fourth metatarsal head concerning for acute osteomyelitis. The air in the soft tissues is compatible with a gas-forming organism-question gas gangrene.  There are no acute fractures identified. There are osteoarthritic changes in the midfoot. A plantar and dorsal heel spur noted. There is calcification along the plantar fascia.  IMPRESSION:   1. Findings compatible with cellulitis with gas-forming organism/air in the soft tissues raising the question of gas gangrene.  2. Findings compatible with acute osteomyelitis involving the fourth metatarsal head.  3. Evidence prior to dictation of the second, fourth, and fifth digit at the level of metatarsophalangeal joint.  4. Osteoarthritic changes in the midfoot noted.         Assessment/Plan     1. Right foot Gas gangrene/Osteomyelitis/nonhealing DFU/cellulitis -- Admit directly to hospitalCons[facility]iat[facility] for amputation of right forefoot vs entire foot -- I did speak to Dr. Rosenthal. Start on IV Vancomycin (NO Zosyn due to PCN allergy) as well as IV morphine for pain medication. Will cover blood sugar with leave him ear and Accu-Cheks as well as hospitalist sliding scale insulin. Proceed according to hospital course.        Assessment & Plan     I discussed the patients findings and my recommendations with patient     Jamaal Bravo MD  03/02/17  11:07 AM     Time: Total admit time 30 min          Routing History       Date/Time From To Method     3/2/2017 11:26 AM MD Jamaal Elaine MD Fax                             Unable to addend/edit note -- patient was NOT to be started on Zosyn due to supposedly having PCN allergy.  Will start on Vanc and order Blood/urine  cultures.

## 2017-03-02 NOTE — PROGRESS NOTES
Pharmacokinetics by Pharmacy - Vancomycin    Emre Lisa is a 56 y.o. male   [Ht:  ; Wt:  ]    CrCl cannot be calculated (Patient has no serum creatinine result on file.).   Lab Results   Component Value Date    CREATININE 1.0 10/25/2016    CREATININE 0.9 07/13/2016    CREATININE 1.2 03/29/2016      Lab Results   Component Value Date    WBC 6.3 10/25/2016    WBC 3.8 07/13/2016    WBC 4.4 03/29/2016      Temp Readings from Last 1 Encounters:   11/14/16 99.4 °F (37.4 °C)       Indication for use: cellulitis vs osteomyelitis     Baseline culture results:  Microbiology Results (last 10 days)       ** No results found for the last 240 hours. **               Assessment/Plan  Initiated Vancomycin 2500 mg IVPB STAT. Will order further doses and appropriate trough once renal function is established.  Pharmacy will monitor renal function and adjust dose accordingly.    Mike Alarcon SONG  03/02/17 12:51 PM

## 2017-03-02 NOTE — H&P
"      Patient Care Team:  Jamaal Bravo MD as PCP - General    Chief complaint: \"Right foot/toe pain.\"    Subjective     History of Present Illness     Emre comes in today to [facility] for worsening right foot pain/swelling/drainage and difficulty walking.  He has difficult to control T2 DM and h/o previous DFU/toe amputations.  He was to have Right 4th toe amputation by Dr. Marco A Thompson ([facility]) in the next 2 weeks, and has recently been on Doxycycline for cellulitis.  He has had multiple problems in the past with his left and right feet. He has had 5th toe (3/2016) and 2nd toe (in 2013) both by former [facility] Podiatrist (Dr. Tyler) for DFUs, and most recently has bene followed at [facility] for left DFUs, which are healing and will not require amputation. No problems in the past with any surgery/GETA. Patient can perform light housework (dusing, cleaning), walk 2-3 blocks and 1-2 flights of stairs before becoming SOA, but back to baseline after resting a few minutes. Good overall functional capacity (>4METs) without symptoms. Normal daily DM foot checks; wears indoor house shoes and tries to trim calluses/lotion feet. DFU R>L feet. Patient has DM shoes, but needs DM boots for work -- asking for Rx today. Still using Neurontin 300 mg up to QID for DPN. Needing refill today. He has NOT had fasting labs/Hgb A1c checked in the past 6+ months. His glucometer (one touch) shows avg  mg/dl fasting and <140 mg/dl 2 hr PP.  Patient is a non-smoker. No CP, SOB, HA. A 10 point ROS was completed. Other than above pertinent positives, all systems negative.      Review of Systems   Constitutional: Positive for fever.   HENT: Negative.    Eyes: Negative.    Respiratory: Negative.    Cardiovascular: Negative.    Endocrine: Negative.    Genitourinary: Negative.    Musculoskeletal: Negative.    Skin: Positive for color change and wound.   Allergic/Immunologic: Negative.    Neurological: Negative. " "   Hematological: Negative.    Psychiatric/Behavioral: Negative.    All other systems reviewed and are negative.       No past medical history on file.  No past surgical history on file.  No family history on file.  Social History   Substance Use Topics   • Smoking status: Never Smoker   • Smokeless tobacco: Not on file   • Alcohol use Yes      Comment: socially       (Not in a hospital admission)  Allergies:  Januvia [sitagliptin]; Lipitor [atorvastatin]; Peanut-containing drug products; Penicillins; and Sulfa antibiotics    Objective      Vital Signs  Vitals Recorded in This Encounter        3/2/2017 0919             BP: 124/64 mmHg    Pulse: 97    Temp: 98.2 °F (36.8 °C)    Temp src: Temporal    SpO2: 100 %    Weight: 283 lb (128.368 kg)    Height: 6' 3\" (1.905 m)    BMI (Calculated): 35.4            Physical Exam   Constitutional: He is oriented to person, place, and time. He appears well-developed and well-nourished.   HENT:   Head: Normocephalic and atraumatic.   Right Ear: External ear normal.   Left Ear: External ear normal.   Nose: Nose normal.   Mouth/Throat: Oropharynx is clear and moist. No oropharyngeal exudate.   Eyes: Conjunctivae and EOM are normal. Pupils are equal, round, and reactive to light. Right eye exhibits no discharge. Left eye exhibits no discharge. No scleral icterus.   Neck: Normal range of motion. Neck supple. No tracheal deviation present. No thyromegaly present.   Cardiovascular: Normal rate, regular rhythm and normal heart sounds.  Exam reveals no gallop and no friction rub.    No murmur heard.  Pulmonary/Chest: Effort normal and breath sounds normal. No respiratory distress. He has no wheezes. He has no rales. He exhibits no tenderness.   Abdominal: Soft. Bowel sounds are normal. He exhibits no mass. There is no tenderness. There is no rebound and no guarding.   Genitourinary:   Genitourinary Comments: ENDER deferred; no suprapubic/CVA tenderness.     Musculoskeletal: He exhibits " edema, tenderness and deformity.   Right foot: Marked erythema and edema lateral aspect forefoot also involving 1st and 3rd toes. Second, 4th and 5th toes absent. Deep ulcer plantar surface in the area of the heads of the 4th and 5th metatarsals. Draining serosanguineous material. Also draining ulcer over 1st MTP. Marked valgus deformity great toe. No ankle or distal leg involvement    Lymphadenopathy:     He has no cervical adenopathy.   Neurological: He is alert and oriented to person, place, and time. He displays normal reflexes. No cranial nerve deficit. Coordination normal.   Skin: Skin is warm and dry. There is erythema.   See above   Nursing note and vitals reviewed.      Results Review:    Right foot x-ray from [facility] 3/2/2017:  3 views the right foot are reviewed. The patient demonstrates evidence prior 8 dictation of the second, fourth, and fifth digit at the level of the metatarsophalangeal joint. There is marked soft tissue swelling noted in the forefoot laterally. There is   air within the soft tissues and there are erosive changes around the fourth metatarsal head concerning for acute osteomyelitis. The air in the soft tissues is compatible with a gas-forming organism-question gas gangrene.  There are no acute fractures identified. There are osteoarthritic changes in the midfoot. A plantar and dorsal heel spur noted. There is calcification along the plantar fascia.  IMPRESSION:   1. Findings compatible with cellulitis with gas-forming organism/air in the soft tissues raising the question of gas gangrene.  2. Findings compatible with acute osteomyelitis involving the fourth metatarsal head.  3. Evidence prior to dictation of the second, fourth, and fifth digit at the level of metatarsophalangeal joint.  4. Osteoarthritic changes in the midfoot noted.       Assessment/Plan   1. Right foot Gas gangrene/Osteomyelitis/nonhealing DFU/cellulitis -- [facility]  [facility] right forefoot vs entire foot --  I did speak to Dr. Rosenthal.  Start on IV Zosyn as well as IV morphine for pain medication.  Will cover blood sugar with leave him ear and Accu-Cheks as well as hospitalist sliding scale insulin.  Proceed according to hospital course.      Assessment & Plan    I discussed the patients findings and my recommendations with patient    Jamaal Bravo MD  03/02/17  11:07 AM    Time: Total admit time 30 min

## 2017-03-03 ENCOUNTER — APPOINTMENT (OUTPATIENT)
Dept: PREADMISSION TESTING | Facility: HOSPITAL | Age: 57
End: 2017-03-03

## 2017-03-03 ENCOUNTER — APPOINTMENT (OUTPATIENT)
Dept: ULTRASOUND IMAGING | Facility: HOSPITAL | Age: 57
End: 2017-03-03

## 2017-03-03 PROBLEM — R19.09 NODULE OF GROIN: Status: ACTIVE | Noted: 2017-03-03

## 2017-03-03 PROBLEM — E11.628 TYPE 2 DIABETES MELLITUS WITH SKIN COMPLICATION: Status: ACTIVE | Noted: 2017-03-03

## 2017-03-03 LAB
ALBUMIN SERPL-MCNC: 3.7 G/DL (ref 3.4–4.8)
ALBUMIN/GLOB SERPL: 1.1 G/DL (ref 1.1–1.8)
ALP SERPL-CCNC: 48 U/L (ref 38–126)
ALT SERPL W P-5'-P-CCNC: 27 U/L (ref 21–72)
ANION GAP SERPL CALCULATED.3IONS-SCNC: 9 MMOL/L (ref 5–15)
AST SERPL-CCNC: 26 U/L (ref 17–59)
BASOPHILS # BLD AUTO: 0.03 10*3/MM3 (ref 0–0.2)
BASOPHILS NFR BLD AUTO: 0.5 % (ref 0–2)
BILIRUB SERPL-MCNC: 0.7 MG/DL (ref 0.2–1.3)
BUN BLD-MCNC: 25 MG/DL (ref 7–21)
BUN/CREAT SERPL: 30.9 (ref 7–25)
CALCIUM SPEC-SCNC: 8.4 MG/DL (ref 8.4–10.2)
CHLORIDE SERPL-SCNC: 107 MMOL/L (ref 95–110)
CO2 SERPL-SCNC: 25 MMOL/L (ref 22–31)
CREAT BLD-MCNC: 0.81 MG/DL (ref 0.7–1.3)
DEPRECATED RDW RBC AUTO: 46 FL (ref 35.1–43.9)
EOSINOPHIL # BLD AUTO: 0.14 10*3/MM3 (ref 0–0.7)
EOSINOPHIL NFR BLD AUTO: 2.4 % (ref 0–7)
ERYTHROCYTE [DISTWIDTH] IN BLOOD BY AUTOMATED COUNT: 14.5 % (ref 11.5–14.5)
GFR SERPL CREATININE-BSD FRML MDRD: 99 ML/MIN/1.73 (ref 60–130)
GLOBULIN UR ELPH-MCNC: 3.3 GM/DL (ref 2.3–3.5)
GLUCOSE BLD-MCNC: 79 MG/DL (ref 60–100)
GLUCOSE BLDC GLUCOMTR-MCNC: 112 MG/DL (ref 70–130)
GLUCOSE BLDC GLUCOMTR-MCNC: 73 MG/DL (ref 70–130)
GLUCOSE BLDC GLUCOMTR-MCNC: 84 MG/DL (ref 70–130)
GLUCOSE BLDC GLUCOMTR-MCNC: 90 MG/DL (ref 70–130)
GLUCOSE BLDC GLUCOMTR-MCNC: 93 MG/DL (ref 70–130)
HCT VFR BLD AUTO: 35.8 % (ref 39–49)
HGB BLD-MCNC: 12 G/DL (ref 13.7–17.3)
IMM GRANULOCYTES # BLD: 0.01 10*3/MM3 (ref 0–0.02)
IMM GRANULOCYTES NFR BLD: 0.2 % (ref 0–0.5)
LYMPHOCYTES # BLD AUTO: 1.28 10*3/MM3 (ref 0.6–4.2)
LYMPHOCYTES NFR BLD AUTO: 21.6 % (ref 10–50)
MCH RBC QN AUTO: 29.2 PG (ref 26.5–34)
MCHC RBC AUTO-ENTMCNC: 33.5 G/DL (ref 31.5–36.3)
MCV RBC AUTO: 87.1 FL (ref 80–98)
MONOCYTES # BLD AUTO: 0.7 10*3/MM3 (ref 0–0.9)
MONOCYTES NFR BLD AUTO: 11.8 % (ref 0–12)
NEUTROPHILS # BLD AUTO: 3.76 10*3/MM3 (ref 2–8.6)
NEUTROPHILS NFR BLD AUTO: 63.5 % (ref 37–80)
PLATELET # BLD AUTO: 131 10*3/MM3 (ref 150–450)
PMV BLD AUTO: 10.7 FL (ref 8–12)
POTASSIUM BLD-SCNC: 4.5 MMOL/L (ref 3.5–5.1)
PROT SERPL-MCNC: 7 G/DL (ref 6.3–8.6)
RBC # BLD AUTO: 4.11 10*6/MM3 (ref 4.37–5.74)
SODIUM BLD-SCNC: 141 MMOL/L (ref 137–145)
WBC NRBC COR # BLD: 5.92 10*3/MM3 (ref 3.2–9.8)

## 2017-03-03 PROCEDURE — 76882 US LMTD JT/FCL EVL NVASC XTR: CPT

## 2017-03-03 PROCEDURE — 25010000002 ENOXAPARIN PER 10 MG: Performed by: FAMILY MEDICINE

## 2017-03-03 PROCEDURE — 25010000002 VANCOMYCIN PER 500 MG: Performed by: FAMILY MEDICINE

## 2017-03-03 PROCEDURE — 85025 COMPLETE CBC W/AUTO DIFF WBC: CPT | Performed by: FAMILY MEDICINE

## 2017-03-03 PROCEDURE — 82962 GLUCOSE BLOOD TEST: CPT

## 2017-03-03 PROCEDURE — 80053 COMPREHEN METABOLIC PANEL: CPT | Performed by: FAMILY MEDICINE

## 2017-03-03 PROCEDURE — 99024 POSTOP FOLLOW-UP VISIT: CPT | Performed by: PODIATRIST

## 2017-03-03 RX ORDER — GABAPENTIN 300 MG/1
300 CAPSULE ORAL EVERY 8 HOURS SCHEDULED
Status: DISCONTINUED | OUTPATIENT
Start: 2017-03-03 | End: 2017-03-09 | Stop reason: HOSPADM

## 2017-03-03 RX ADMIN — METOPROLOL TARTRATE 12.5 MG: 25 TABLET ORAL at 09:14

## 2017-03-03 RX ADMIN — DRONEDARONE 400 MG: 400 TABLET, FILM COATED ORAL at 21:11

## 2017-03-03 RX ADMIN — VANCOMYCIN HYDROCHLORIDE 1750 MG: 1 INJECTION, POWDER, LYOPHILIZED, FOR SOLUTION INTRAVENOUS at 03:17

## 2017-03-03 RX ADMIN — ACETAMINOPHEN 650 MG: 325 TABLET ORAL at 09:22

## 2017-03-03 RX ADMIN — ACETAMINOPHEN 650 MG: 325 TABLET ORAL at 20:09

## 2017-03-03 RX ADMIN — METOPROLOL TARTRATE 12.5 MG: 25 TABLET ORAL at 17:29

## 2017-03-03 RX ADMIN — GABAPENTIN 300 MG: 300 CAPSULE ORAL at 21:11

## 2017-03-03 RX ADMIN — GABAPENTIN 300 MG: 300 CAPSULE ORAL at 13:28

## 2017-03-03 RX ADMIN — ENOXAPARIN SODIUM 40 MG: 40 INJECTION SUBCUTANEOUS at 09:13

## 2017-03-03 RX ADMIN — VALSARTAN 40 MG: 40 TABLET, FILM COATED ORAL at 21:11

## 2017-03-03 RX ADMIN — FLUTICASONE PROPIONATE 1 SPRAY: 50 SPRAY, METERED NASAL at 17:28

## 2017-03-03 RX ADMIN — GABAPENTIN 300 MG: 300 CAPSULE ORAL at 06:40

## 2017-03-03 RX ADMIN — VANCOMYCIN HYDROCHLORIDE 1750 MG: 1 INJECTION, POWDER, LYOPHILIZED, FOR SOLUTION INTRAVENOUS at 15:22

## 2017-03-03 RX ADMIN — MAGNESIUM OXIDE TAB 400 MG (241.3 MG ELEMENTAL MG) 400 MG: 400 (241.3 MG) TAB at 09:14

## 2017-03-03 RX ADMIN — ACETAMINOPHEN 650 MG: 325 TABLET ORAL at 03:31

## 2017-03-03 RX ADMIN — PANTOPRAZOLE SODIUM 40 MG: 40 TABLET, DELAYED RELEASE ORAL at 06:40

## 2017-03-03 NOTE — PLAN OF CARE
Problem: Patient Care Overview (Adult)  Goal: Plan of Care Review  Outcome: Ongoing (interventions implemented as appropriate)    03/03/17 0513   Coping/Psychosocial Response Interventions   Plan Of Care Reviewed With patient   Patient Care Overview   Progress progress toward functional goals as expected   Outcome Evaluation   Outcome Summary/Follow up Plan No pain after foot I&D. Dressing is CDI.        Goal: Adult Individualization and Mutuality  Outcome: Ongoing (interventions implemented as appropriate)  Goal: Discharge Needs Assessment  Outcome: Ongoing (interventions implemented as appropriate)    Problem: Cellulitis (Adult)  Goal: Signs and Symptoms of Listed Potential Problems Will be Absent or Manageable (Cellulitis)  Outcome: Ongoing (interventions implemented as appropriate)    Problem: Diabetes, Type 2 (Adult)  Goal: Signs and Symptoms of Listed Potential Problems Will be Absent or Manageable (Diabetes, Type 2)  Outcome: Ongoing (interventions implemented as appropriate)

## 2017-03-03 NOTE — PROGRESS NOTES
"   LOS: 1 day   Patient Care Team:  Jamaal Bravo MD as PCP - General    Chief Complaint: \"I feel pretty good; worried about lump in my groin area.\"    Subjective     History of Present Illness    Subjective    Patient had I&D of Right 5th toe/digit last evening by Dr. Rosenthal (DPM) -- see below note.  Repeat x-ray did NOT show any evidence of gas gangrene.  Amputation will still be needed, but treatment of infection/Osteomyelitis was first priority.  Patient's pain well controlled this AM and he is without c/o, other than having a NT lymph node/\"lump\" in his right groin that is concerning him.   Patient denies HA, CP, palpitations, SOA/wheezing, n/v, diarrhea/constipation. A 10 point ROS was completed.  Other than the above pertinent positives, all systems negative.  History taken from: patient & chart.    Objective     Vital Signs  Temp:  [97 °F (36.1 °C)-99.3 °F (37.4 °C)] 98.3 °F (36.8 °C)  Heart Rate:  [66-85] 76  Resp:  [18] 18  BP: (105-137)/(50-78) 105/50    Objective:  General Appearance:  Comfortable.    Vital signs: (most recent): Blood pressure 105/50, pulse 76, temperature 98.3 °F (36.8 °C), temperature source Oral, resp. rate 18, SpO2 95 %.  Vital signs are normal.  No fever.    Output: Producing urine and minimal stool output.    HEENT: Normal HEENT exam.    Lungs:  Normal respiratory rate and normal effort.  He is not in respiratory distress.  Breath sounds clear to auscultation.  No stridor.  No wheezes or rhonchi.    Heart: Normal rate.  Regular rhythm.  S1 normal and S2 normal.    Chest: No chest wall tenderness.  Symmetric chest wall expansion.   Abdomen: Abdomen is soft and non-distended.  There are no signs of ascites.  Bowel sounds are normal.   There is no abdominal tenderness.  There is no rebound tenderness.  There is no guarding.   There is no mass. There is no splenomegaly. There is no hepatomegaly. (Small (<1.5 cm) rubbery, NT, right groin lump consistent with solitary LN.  No " erythema/edema/warmth associated.).   Extremities: Normal range of motion.  There is deformity.  There is no dependent edema.  (Right foot wrapped, C/D/I s/p I&D)  Pulses: Distal pulses are intact.    Neurological: Patient is alert and oriented to person, place and time.    Pupils:  Pupils are equal, round, and reactive to light.    Skin:  Warm and dry.  No rash.             Results Review:     I reviewed the patient's new clinical results.     Lab Results (last 24 hours)     Procedure Component Value Units Date/Time    CBC Auto Differential [[id number]]  (Abnormal) Collected:  03/02/17 1233    Specimen:  Blood Updated:  03/02/17 1251     WBC 9.21 10*3/mm3      RBC 4.32 (L) 10*6/mm3      Hemoglobin 12.8 (L) g/dL      Hematocrit 37.0 (L) %      MCV 85.6 fL      MCH 29.6 pg      MCHC 34.6 g/dL      RDW 14.3 %      RDW-SD 45.1 (H) fl      MPV 10.9 fL      Platelets 128 (L) 10*3/mm3      Neutrophil % 77.9 %      Lymphocyte % 11.8 %      Monocyte % 8.5 %      Eosinophil % 1.4 %      Basophil % 0.2 %      Immature Grans % 0.2 %      Neutrophils, Absolute 7.17 10*3/mm3      Lymphocytes, Absolute 1.09 10*3/mm3      Monocytes, Absolute 0.78 10*3/mm3      Eosinophils, Absolute 0.13 10*3/mm3      Basophils, Absolute 0.02 10*3/mm3      Immature Grans, Absolute 0.02 10*3/mm3     SCANNED - LABS [[id number]] Resulted:  03/02/17      Updated:  03/02/17 1258    POC Glucose Fingerstick [[id number]]  (Normal) Collected:  03/02/17 1301    Specimen:  Blood Updated:  03/02/17 1319     Glucose 128 mg/dL       RN NotifiedSliding Scale AdminMeter: [id number]: [id number] MAXIMINO L       Extra Tubes [[id number]] Collected:  03/02/17 1304    Specimen:  Blood from Blood, Venous Line Updated:  03/02/17 1801    Narrative:       The following orders were created for panel order Extra Tubes.  Procedure                               Abnormality         Status                     ---------                               -----------         ------                      Gold Top - SST[[id number]]                                    Final result                 Please view results for these tests on the individual orders.    [facility] [[id number]] Collected:  03/02/17 1304    Specimen:  Blood Updated:  03/02/17 1801     Extra Tube Hold for add-ons.       Auto resulted.       Wound Culture [[id number]] Collected:  03/02/17 1946    Specimen:  Wound from Foot, Right Updated:  03/02/17 2136     Gram Stain Result Rare (1+) WBCs seen       No organisms seen     POC Glucose Fingerstick [[id number]]  (Abnormal) Collected:  03/02/17 2215    Specimen:  Blood Updated:  03/02/17 2307     Glucose 68 (L) mg/dL       Meter: [id number]: [id number] MENDYRICHARD DILL       Blood Culture [[id number]]  (Normal) Collected:  03/02/17 1302    Specimen:  Blood from Arm, Left Updated:  03/03/17 0206     Blood Culture No growth at less than 24 hours     Blood Culture [[id number]]  (Normal) Collected:  03/02/17 1305    Specimen:  Blood from Arm, Right Updated:  03/03/17 0206     Blood Culture No growth at less than 24 hours         Imaging Results (last 24 hours)     Procedure Component Value Units Date/Time    XR Foot 3+ View Right [[id number]] Collected:  03/02/17 1756     Updated:  03/02/17 1801    Narrative:       Patient Name:  AMEE FONG  Patient ID:  [id number]   Ordering:  ALVIN ROYAL  Attending:  NITISH DILLARD  Referring:  ALVIN ROYAL  ------------------------------------------------    DATE OF EXAM: 3/2/2017 4:39 PM CST    PROCEDURE: XR FOOT 3 OR MORE VIEWS    INDICATION FOR PROCEDURE: 56 years old patient presents for  evaluation of right foot infection.    TECHNIQUE:  Three views of the right foot are obtained portably.    COMPARISON:  Radiographs of the right foot dated December 14, 2016.    FINDINGS:  Patient has had amputations of the phalanges of the  second, fourth and fifth toes. This is unchanged in the previous  study. There is soft tissue swelling of the  foot particularly  dorsally.    There is severe hallux valgus. There are degenerative changes at  the first metatarsophalangeal joint. Erosions are visible within  the first metatarsal head that suggests possibility of callus.  Erosions are also apparent of the proximal phalanx of the hallux.    There is soft tissue lucency near the fourth metatarsal head that  may represent an ulceration. There may be small erosions within  the third, and fourth metatarsal heads.    There is a posterior calcaneal enthesophyte, plantar calcaneal  enthesophyte and spurs. There may also be some chondrocalcinosis  of the plantar fascia. There are erosive and degenerative changes  at the intertarsal articulations and tarsal metatarsal  articulations.      Impression:         1.  Diffuse soft tissue swelling and evidence for ulceration near  the fourth metatarsal.   2.  Severe hallux valgus.   3.  Erosive and degenerative changes of the foot as described.   4.  Calcaneal spurs.    Electronically signed by:  Yana Waller MD  3/2/2017 5:59 PM CST  Workstation: [email or web address]           Operative/Procedure Notes (most recent note)      Tung Rosenthal DPM at 3/2/2017  8:10 PM  Version 1 of 1         FOOT WOUND CLOSURE  Procedure Note    Emre Lisa  3/2/2017    Pre-op Diagnosis:   Acute osteomyelitis of right foot [M86.171]    Post-op Diagnosis:     Post-Op Diagnosis Codes:     * Acute osteomyelitis of right foot [M86.171]     * Foot abscess, right [L02.611]    Procedure/CPT® Codes:      Procedure(s):  INCISION AND DRAINAGE, RIGHT FOOT    Surgeon(s):  Tung Rosenthal DPM    Anesthesia: Monitor Anesthesia Care    Staff:   Circulator: Abi Almaguer RN  Scrub Person: Aniyah Mujica V  Assistant: Lisa Stiles MA    Estimated Blood Loss: * No values recorded between 3/2/2017  7:22 PM and 3/2/2017  8:07 PM *  Urine Voided: * No values recorded between 3/2/2017  7:22 PM and 3/2/2017  8:07 PM *    Specimens:                  ID  Type Source Tests Collected by Time Destination   1 : wound culture right foot Wound Foot, Right WOUND CULTURE Tung Rosenthal DPM 3/2/2017 1946          Drains:           Findings: Consistent with diagnosis, dorsal lateral foot abscess with subcutaneous necrosis    Complications: none      Tung Rosenthal DPM     Date: 3/2/2017  Time: 8:10 PM         Electronically signed by Tung Rosenthal DPM at 3/2/2017  8:11 PM        Medication Review:     Current Facility-Administered Medications:   •  acetaminophen (TYLENOL) tablet 650 mg, 650 mg, Oral, Q4H PRN, Jamaal Bravo MD, 650 mg at 03/03/17 0331  •  bupivacaine (PF) (MARCAINE) 0.5 % injection, , , PRN, Tung Rosenthal DPM, 30 mL at 03/02/17 1942  •  dronedarone (MULTAQ) tablet 400 mg, 400 mg, Oral, BID With Meals, Jamaal Bravo MD, 400 mg at 03/02/17 1834  •  enoxaparin (LOVENOX) syringe 40 mg, 40 mg, Subcutaneous, Daily, Jamaal Bravo MD, 40 mg at 03/02/17 1454  •  fluticasone (FLONASE) 50 MCG/ACT nasal spray 1 spray, 1 spray, Each Nare, BID, Jamaal Bravo MD, 1 spray at 03/02/17 1441  •  HYDROcodone-acetaminophen (NORCO) 7.5-325 MG per tablet 1 tablet, 1 tablet, Oral, Q6H PRN, Tung Rosenthal DPM  •  insulin aspart (novoLOG) injection 0-9 Units, 0-9 Units, Subcutaneous, 4x Daily AC & at Bedtime, Jamaal Bravo MD, 0 Units at 03/02/17 1834  •  insulin detemir (LEVEMIR) injection 15 Units, 15 Units, Subcutaneous, Nightly, Jamaal Bravo MD  •  magnesium oxide (MAGOX) tablet 400 mg, 400 mg, Oral, Daily, Jamaal Bravo MD, 400 mg at 03/02/17 1441  •  metoprolol tartrate (LOPRESSOR) half tablet 12.5 mg, 12.5 mg, Oral, BID, Jamaal Bravo MD, 12.5 mg at 03/02/17 2220  •  Morphine sulfate (PF) injection 2 mg, 2 mg, Intravenous, Q2H PRN, Jamaal Bravo MD  •  pantoprazole (PROTONIX) EC tablet 40 mg, 40 mg, Oral, Q AM, Jamaal Bravo MD  •  Pharmacy to dose vancomycin, , Does not apply, Continuous PRN, Jamaal Bravo MD  •   sodium chloride 0.9 % flush 1-10 mL, 1-10 mL, Intravenous, PRN, Jamaal Bravo MD, 10 mL at 03/02/17 0071  •  valsartan (DIOVAN) tablet 40 mg, 40 mg, Oral, Nightly, Jamaal Bravo MD, 40 mg at 03/03/17 9696  •  vancomycin (VANCOCIN) 1,750 mg in sodium chloride 0.9 % 500 mL IVPB, 15 mg/kg, Intravenous, Q12H, Jamaal Bravo MD, 1,750 mg at 03/03/17 0317      Assessment/Plan     Active Problems:    Foot osteomyelitis, right    Nodule of groin    Type 2 diabetes mellitus with skin complication    Assessment:    Condition: In stable condition.  Improving.       Plan:   Encourage ambulation.         1. POD #1 I&D Right foot ulceration/Osteomyelitis:  Patient doing well after surgery. Appreciate Dr. Rosenthal's help in this matter.  Will check back with Podiatry to see what timeline/plan for any further surgery, including amputation, will be.  Hospitalist will be covering me this weekend.      2. Right groin lump:  NT/benign in palpation/appearance -- suspected Lymph Node, but patient worried about it.  Will check Ultrasound and proceed accordingly.  Will need to update patient on findings.    3. T2 DM:  Continue SC Levemir/SSI while in house -- d/c home on oral meds and we'll need to follow up 1 week post-d/c with Dr. Bravo for recheck.      Jamaal Bravo MD  03/03/17  5:06 AM    Time: 15 min

## 2017-03-03 NOTE — ANESTHESIA PREPROCEDURE EVALUATION
Anesthesia Evaluation     Patient summary reviewed and Nursing notes reviewed   NPO Status: > 6 hours   Airway   Mallampati: II  TM distance: >3 FB  Neck ROM: full  possible difficult intubation  Dental    (+) poor dentation    Pulmonary - negative pulmonary ROS and normal exam    breath sounds clear to auscultation  Cardiovascular - normal exam    Rhythm: regular  Rate: normal    (+) hypertension well controlled,     ROS comment: History cardiac defibrillator    Neuro/Psych- negative ROS  GI/Hepatic/Renal/Endo    (+)  diabetes mellitus type 2 well controlled,     Musculoskeletal     Abdominal   (+) obese,    Substance History - negative use     OB/GYN negative ob/gyn ROS         Other   (+) arthritis                                 Anesthesia Plan    ASA 3 - emergent     MAC     intravenous induction   Anesthetic plan and risks discussed with patient.

## 2017-03-03 NOTE — PROGRESS NOTES
Pharmacokinetics by Pharmacy - Vancomycin    Emre Lisa is a 56 y.o. male  [Ht:  ; Wt:  ]    Estimated Creatinine Clearance: 148.9 mL/min (by C-G formula based on Cr of 0.81).   Lab Results   Component Value Date    CREATININE 0.81 03/03/2017    CREATININE 0.88 03/02/2017    CREATININE 1.0 10/25/2016    CREATININE 0.9 07/13/2016    CREATININE 1.2 03/29/2016      Lab Results   Component Value Date    WBC 5.92 03/03/2017    WBC 9.21 03/02/2017    WBC 6.3 10/25/2016      Temp Readings from Last 1 Encounters:   03/03/17 96.3 °F (35.7 °C) (Oral)      Lab Results   Component Value Date    VANCOTROUGH 11.7 03/13/2016         Culture Results:  Microbiology Results (last 10 days)       Procedure Component Value - Date/Time      Wound Culture [[id number]] Collected:  03/02/17 1946    Lab Status:  Preliminary result Specimen:  Wound from Foot, Right Updated:  03/02/17 2136     Gram Stain Result Rare (1+) WBCs seen       No organisms seen     Blood Culture [[id number]]  (Normal) Collected:  03/02/17 1305    Lab Status:  Preliminary result Specimen:  Blood from Arm, Right Updated:  03/03/17 0206     Blood Culture No growth at less than 24 hours     Blood Culture [[id number]]  (Normal) Collected:  03/02/17 1302    Lab Status:  Preliminary result Specimen:  Blood from Arm, Left Updated:  03/03/17 0206     Blood Culture No growth at less than 24 hours                Indication for use: Osteomyelitis    Current Vancomycin Dose:  1750 mg IVPB every 12 hours, day 2 of therapy.      Assessment/Plan:  Will obtain trough 3/4 (trough goal 15-20). Pharmacy will continue to monitor renal function and adjust dose accordingly.    Sandor Meléndez III [facility]   03/03/17 10:29 AM

## 2017-03-03 NOTE — ANESTHESIA POSTPROCEDURE EVALUATION
Patient: Emre Lisa    Procedure Summary     Date Anesthesia Start Anesthesia Stop Room / Location    03/02/17 1922 2006  [facility] OR [facility] / [facility]       Procedure Diagnosis Surgeon Provider    INCISION AND DRAINAGE, RIGHT FOOT (Right Foot) Acute osteomyelitis of right foot  (Acute osteomyelitis of right foot [M86.171]) CHRISTIAN Hoffman MD          Anesthesia Type: MAC  Last vitals  /79   Temp   98.6   Pulse  76   Resp   18   SpO2   99     Post Anesthesia Care and Evaluation    Patient location during evaluation: bedside  Patient participation: complete - patient participated  Level of consciousness: awake and alert  Pain score: 0  Pain management: adequate  Airway patency: patent  Anesthetic complications: No anesthetic complications  PONV Status: none  Cardiovascular status: acceptable  Respiratory status: acceptable  Hydration status: acceptable    Patient to holding area awake and alert;sent back to the floor in good condition at end of the  procedure.

## 2017-03-03 NOTE — BRIEF OP NOTE
FOOT WOUND CLOSURE  Procedure Note    Emre Lisa  3/2/2017    Pre-op Diagnosis:   Acute osteomyelitis of right foot [M86.171]    Post-op Diagnosis:     Post-Op Diagnosis Codes:     * Acute osteomyelitis of right foot [M86.171]     * Foot abscess, right [L02.611]    Procedure/CPT® Codes:      Procedure(s):  INCISION AND DRAINAGE, RIGHT FOOT    Surgeon(s):  Tung Rosenthal DPM    Anesthesia: Monitor Anesthesia Care    Staff:   Circulator: Abi Almaguer RN  Scrub Person: Aniyah Mujica V  Assistant: Lisa Stiles MA    Estimated Blood Loss: * No values recorded between 3/2/2017  7:22 PM and 3/2/2017  8:07 PM *  Urine Voided: * No values recorded between 3/2/2017  7:22 PM and 3/2/2017  8:07 PM *    Specimens:                  ID Type Source Tests Collected by Time Destination   1 : wound culture right foot Wound Foot, Right WOUND CULTURE Tung Rosenthal DPM 3/2/2017 1946          Drains:           Findings: Consistent with diagnosis, dorsal lateral foot abscess with subcutaneous necrosis    Complications: none      Tung Rosenthal DPM     Date: 3/2/2017  Time: 8:10 PM

## 2017-03-03 NOTE — INTERVAL H&P NOTE
H&P reviewed. The patient was examined and there are no changes to the H&P.     Patient to OR for surgical incision and drainage of right foot. Will plan to stage to TMA at later date. All risks, benefits and potential complications discussed.          This document has been electronically signed by Tung Rosenthal DPM on March 2, 2017 7:16 PM

## 2017-03-04 ENCOUNTER — ANESTHESIA EVENT (OUTPATIENT)
Dept: PERIOP | Facility: HOSPITAL | Age: 57
End: 2017-03-04

## 2017-03-04 ENCOUNTER — PREP FOR SURGERY (OUTPATIENT)
Dept: PODIATRY | Facility: CLINIC | Age: 57
End: 2017-03-04

## 2017-03-04 DIAGNOSIS — M86.171 ACUTE OSTEOMYELITIS OF RIGHT FOOT (HCC): Primary | ICD-10-CM

## 2017-03-04 LAB
BACTERIA SPEC AEROBE CULT: ABNORMAL
GLUCOSE BLDC GLUCOMTR-MCNC: 94 MG/DL (ref 70–130)
GRAM STN SPEC: ABNORMAL
GRAM STN SPEC: ABNORMAL
VANCOMYCIN TROUGH SERPL-MCNC: 11.87 MCG/ML (ref 10–15)

## 2017-03-04 PROCEDURE — 82962 GLUCOSE BLOOD TEST: CPT

## 2017-03-04 PROCEDURE — 99024 POSTOP FOLLOW-UP VISIT: CPT | Performed by: PODIATRIST

## 2017-03-04 PROCEDURE — 25010000002 ENOXAPARIN PER 10 MG: Performed by: FAMILY MEDICINE

## 2017-03-04 PROCEDURE — 80202 ASSAY OF VANCOMYCIN: CPT | Performed by: FAMILY MEDICINE

## 2017-03-04 PROCEDURE — 25010000002 VANCOMYCIN PER 500 MG: Performed by: FAMILY MEDICINE

## 2017-03-04 RX ORDER — SODIUM CHLORIDE 0.9 % (FLUSH) 0.9 %
1-10 SYRINGE (ML) INJECTION AS NEEDED
Status: CANCELLED | OUTPATIENT
Start: 2017-03-04

## 2017-03-04 RX ORDER — PANTOPRAZOLE SODIUM 40 MG/1
40 TABLET, DELAYED RELEASE ORAL ONCE
Status: COMPLETED | OUTPATIENT
Start: 2017-03-05 | End: 2017-03-05

## 2017-03-04 RX ORDER — SODIUM CHLORIDE 0.9 % (FLUSH) 0.9 %
1-10 SYRINGE (ML) INJECTION AS NEEDED
Status: DISCONTINUED | OUTPATIENT
Start: 2017-03-04 | End: 2017-03-05 | Stop reason: HOSPADM

## 2017-03-04 RX ADMIN — ACETAMINOPHEN 650 MG: 325 TABLET ORAL at 08:01

## 2017-03-04 RX ADMIN — FLUTICASONE PROPIONATE 1 SPRAY: 50 SPRAY, METERED NASAL at 08:02

## 2017-03-04 RX ADMIN — DRONEDARONE 400 MG: 400 TABLET, FILM COATED ORAL at 20:56

## 2017-03-04 RX ADMIN — METOPROLOL TARTRATE 12.5 MG: 25 TABLET ORAL at 17:25

## 2017-03-04 RX ADMIN — VANCOMYCIN HYDROCHLORIDE 1750 MG: 1 INJECTION, POWDER, LYOPHILIZED, FOR SOLUTION INTRAVENOUS at 02:53

## 2017-03-04 RX ADMIN — VANCOMYCIN HYDROCHLORIDE 1750 MG: 1 INJECTION, POWDER, LYOPHILIZED, FOR SOLUTION INTRAVENOUS at 14:19

## 2017-03-04 RX ADMIN — GABAPENTIN 300 MG: 300 CAPSULE ORAL at 14:19

## 2017-03-04 RX ADMIN — GABAPENTIN 300 MG: 300 CAPSULE ORAL at 20:58

## 2017-03-04 RX ADMIN — VALSARTAN 40 MG: 40 TABLET, FILM COATED ORAL at 20:55

## 2017-03-04 RX ADMIN — GABAPENTIN 300 MG: 300 CAPSULE ORAL at 05:31

## 2017-03-04 RX ADMIN — MAGNESIUM OXIDE TAB 400 MG (241.3 MG ELEMENTAL MG) 400 MG: 400 (241.3 MG) TAB at 08:02

## 2017-03-04 RX ADMIN — PANTOPRAZOLE SODIUM 40 MG: 40 TABLET, DELAYED RELEASE ORAL at 05:31

## 2017-03-04 RX ADMIN — ACETAMINOPHEN 650 MG: 325 TABLET ORAL at 04:12

## 2017-03-04 RX ADMIN — ENOXAPARIN SODIUM 40 MG: 40 INJECTION SUBCUTANEOUS at 08:02

## 2017-03-04 NOTE — OP NOTE
DATE OF PROCEDURE:  03/02/2017    SURGEON: Tung Rosenthal DPM    ASSISTANT: Lisa Stiles, CST     PREOPERATIVE DIAGNOSIS: Right foot abscess and cellulitis.    POSTOPERATIVE DIAGNOSIS: Right foot abscess and cellulitis.    PROCEDURES PERFORMED: Incision and drainage, right foot.     ANESTHESIA: MAC with 10 mL of 0.5% Marcaine plain.      HEMOSTASIS: None.     ESTIMATED BLOOD LOSS: 20 mL.     MATERIALS:  1/2-inch iodoform packing.     INJECTABLES: None.      COMPLICATIONS: None.      INDICATIONS FOR PROCEDURE: This is a current inpatient at [facility] who was previously seen by Dr. Thompson in our clinic who evaluated him for bilateral chronic foot wounds and possible osteomyelitis. He was scheduled to undergo a transmetatarsal amputation on 03/09/2017. However, he developed worsening cellulitis which initiated his admission to the hospital. The patient states that he noticed increased pain and erythema over the area over the past 2 days. Radiographs confirmed a small pocket of subcutaneous emphysema and, therefore, the patient presents for surgical debridement. All risks, benefits, and potential complications were described in detail. No guarantee was given or implied at any time. The patient has been n.p.o. since 1 p.m. and is receiving vancomycin and Zosyn on the floor.    DESCRIPTION OF PROCEDURE: Under mild sedation, the patient was brought in the operating room and placed on the operating table in the supine position. Following anesthesia by the anesthesia department, the above-mentioned local anesthetic was injected in a right foot ankle block fashion then the right foot and ankle were prepped and draped in the usual aseptic fashion. Attention at this time was drawn to the lateral midfoot where the plantar ulceration beneath the 4th metatarsal was excised along with an approximately 4 x 3 wedge of skin exposing the distal 4th  and 5th metatarsal heads. The wound was then probed and it  was noted to track proximally along the dorsal and plantar surfaces of the 4th metatarsal. A small amount of purulence was expressed at this time. There was also a fair amount of necrotic and liquefactive subcutaneous fat identified which was debrided with a small rongeur. The deep tissue was then flushed with 3 L normal saline under gravity lavage. Deep cultures were taken. The incision was then packed open with iodoform gauze and a dry sterile dressing applied. The patient was taken from the operating room to the recovery room with vital signs stable and neurovascular status intact. He will be continued to be followed on the floor. Dr. Thompson will return over the weekend and plan will be to carry on with transmetatarsal amputation under his care.      CC:            Tung Rosenthal DPM  Dictation Date/Time: 03/03/2017 18:58:54(Eastern Time Zone)  Transcribed Date/Time: 03/03/2017 19:58:33 (Eastern Time Zone)  Dictator/ Initials:  Tray  Document ID:                [id number]  Job ID: [id number]

## 2017-03-04 NOTE — PLAN OF CARE
Problem: Patient Care Overview (Adult)  Goal: Adult Individualization and Mutuality  Outcome: Ongoing (interventions implemented as appropriate)  Goal: Discharge Needs Assessment  Outcome: Ongoing (interventions implemented as appropriate)    03/04/17 1627   Discharge Needs Assessment   Concerns To Be Addressed no discharge needs identified   Readmission Within The Last 30 Days no previous admission in last 30 days         Problem: Cellulitis (Adult)  Goal: Signs and Symptoms of Listed Potential Problems Will be Absent or Manageable (Cellulitis)  Outcome: Ongoing (interventions implemented as appropriate)    03/04/17 1627   Cellulitis   Problems Present (Cellulitis) none         Problem: Diabetes, Type 2 (Adult)  Goal: Signs and Symptoms of Listed Potential Problems Will be Absent or Manageable (Diabetes, Type 2)  Outcome: Ongoing (interventions implemented as appropriate)    03/04/17 1627   Diabetes, Type 2   Problems Assessed (Type 2 Diabetes) all   Problems Present (Type 2 Diabetes) none

## 2017-03-04 NOTE — ANESTHESIA PREPROCEDURE EVALUATION
Anesthesia Evaluation     Patient summary reviewed and Nursing notes reviewed   NPO Status: > 8 hours   Airway   Mallampati: II  TM distance: >3 FB  Neck ROM: limited  no difficulty expected and possible difficult intubation  Comment: His neck has decreased extension as well as decreased ability to turn to left or right  Has a beard  Dental - normal exam   (+) poor dentation    Pulmonary - negative pulmonary ROS and normal exam     PE comment: AICD is in left chest wall  Cardiovascular - normal exam    ECG reviewed    (+) pacemaker ( originally inserted in 2010 and replaced 7/2016) ICD, pacemaker,hypertension well controlled, past MI ( he states he had a silent MI 30 years ago and had clean coronary arteries in a 2010 catheterization.)  >12 months, dysrhythmias ( currently in NSR, controlled with metoprolol and multaq) Atrial Fib,     ROS comment: Telemetry strip on 3/2/17= NSR.  EKG on 11/10/16 shows NSR at 70 with occasional PVCs, otherwise normal.  He fully denies any recent heart problems.    Neuro/Psych- negative ROS  GI/Hepatic/Renal/Endo    (+) obesity,  diabetes mellitus ( he has diabetic neuropathy with decreased feeling in both feet) well controlled,   GERD:  he is on protonix= ordered pre-op, but he denies having GERD.    Musculoskeletal     Abdominal   (+) obese,    Substance History - negative use     OB/GYN negative ob/gyn ROS         Other   (+) arthritis ( in hands, feet, backand neck)                               Anesthesia Plan    ASA 3     general   (We will plan a general anesthetic since Dr. Thompson is doing a gastrocnemius recession with a thigh tourniquet. Dr. Thompson agreed to use a bi-polar cautery, so we should not need a magnet over his defibrillator.)  intravenous induction   Anesthetic plan and risks discussed with patient and spouse/significant other.    Plan discussed with CRNA and medical student.

## 2017-03-04 NOTE — PROGRESS NOTES
S: Doing well s/p right foot I&D. Denies N/V/F/C/SOB. No pain.    O:  Vitals:    03/03/17 2000   BP: 110/66   Pulse: 69   Resp: 18   Temp: 98.5 °F (36.9 °C)   SpO2: 97%      Constitutional: he appears well-developed and well-nourished.   HEENT: Normocephalic. Atraumatic  CV: No tenderness. RRR  Resp: Non-labored respiration. No wheezes.   Psychiatric: he has a normal mood and affect. his  behavior is normal.       Lower Extremity Exam:  Right foot dressing intact with mild strikethrough.   NVS intact    A/P:  1. Right foot abscess/cellulitis  2. Suspected right foot osteomyelitis  3. DM with peripheral neuropathy    Doing well postoperatively. No acute events O/N. Dressing reinforced. Dr. Thompson with reevaluate over the weekend for further surgical planning.    Please call with questions.          This document has been electronically signed by Tung Rosenthal DPM on March 3, 2017 10:10 PM

## 2017-03-04 NOTE — CONSULTS
Inpatient Consult to Podiatry  Consult performed by: ALVIN ROYAL  Consult ordered by: ALVIN ROYAL  Reason for consult: Right foot abscess        Patient Care Team:  Jamaal Bravo MD as PCP - General    Chief complaint: Right foot cellulitis    History of Present Illness  Mr Lisa is a 55 y/o DM male seen on consult for worsening right foot infection. He has a history of multiple toe amputations. He has been being treated for recurrent wounds by Dr. Thompson and was scheduled to undergo transmetatarsal amputation next week. States increase in drainage and erythema has worsened over the past two days. Xrays in UC did display soft tissue gas. States he has felt off the past 2 days, but denies N/V/F/C/SOB.       Review of Systems     Constitutional:  Denies recent weight loss, weight gain, fever or chills, no change in exercise tolerance  Musculoskeletal: Leg pain.  Skin:  Foot ulcer, cellulitis  Neurological:  LE Numbness  Psychiatric/Behavioral: Denies depression       Vital Signs  Temp:  [96.3 °F (35.7 °C)-99.2 °F (37.3 °C)] 98.5 °F (36.9 °C)  Heart Rate:  [67-76] 69  Resp:  [18] 18  BP: (105-128)/(50-76) 110/66    Physical Exam    Constitutional: he appears well-developed and well-nourished.   HEENT: Normocephalic. Atraumatic  CV: No tenderness. RRR  Resp: Non-labored respiration. No wheezes.   Psychiatric: he has a normal mood and affect. his   behavior is normal.      Lower Extremity Exam:  Vascular: DP/PT pulses palpable 1+.   Negative hair growth.   RLE edema  Neuro: Protective sensation absent, b/l.  DTRs intact  Integument:   Right foot plantar 4th mpj ulcer, medial 1st mtpj ulcer.  Active drainage from from 4th mpj ulcer  Dorsal lateral right foot, anterior shin cellulitis  Musculoskeletal: LE muscle strength 5/5.   Severe right HAV, rigid 3rd HT  +Equinus        Results Review:   WBC   Date Value Ref Range Status   03/03/2017 5.92 3.20 - 9.80 10*3/mm3 Final     RBC   Date Value Ref Range  Status   03/03/2017 4.11 (L) 4.37 - 5.74 10*6/mm3 Final     HEMOGLOBIN   Date Value Ref Range Status   03/03/2017 12.0 (L) 13.7 - 17.3 g/dL Final     HEMATOCRIT   Date Value Ref Range Status   03/03/2017 35.8 (L) 39.0 - 49.0 % Final     MCV   Date Value Ref Range Status   03/03/2017 87.1 80.0 - 98.0 fL Final     MCH   Date Value Ref Range Status   03/03/2017 29.2 26.5 - 34.0 pg Final     MCHC   Date Value Ref Range Status   03/03/2017 33.5 31.5 - 36.3 g/dL Final     RDW   Date Value Ref Range Status   03/03/2017 14.5 11.5 - 14.5 % Final     RDW-SD   Date Value Ref Range Status   03/03/2017 46.0 (H) 35.1 - 43.9 fl Final     MPV   Date Value Ref Range Status   03/03/2017 10.7 8.0 - 12.0 fL Final     PLATELETS   Date Value Ref Range Status   03/03/2017 131 (L) 150 - 450 10*3/mm3 Final     NEUTROPHIL %   Date Value Ref Range Status   03/03/2017 63.5 37.0 - 80.0 % Final     LYMPHOCYTE %   Date Value Ref Range Status   03/03/2017 21.6 10.0 - 50.0 % Final     MONOCYTE %   Date Value Ref Range Status   03/03/2017 11.8 0.0 - 12.0 % Final     EOSINOPHIL %   Date Value Ref Range Status   03/03/2017 2.4 0.0 - 7.0 % Final     BASOPHIL %   Date Value Ref Range Status   03/03/2017 0.5 0.0 - 2.0 % Final     IMMATURE GRANS %   Date Value Ref Range Status   03/03/2017 0.2 0.0 - 0.5 % Final     NEUTROPHILS, ABSOLUTE   Date Value Ref Range Status   03/03/2017 3.76 2.00 - 8.60 10*3/mm3 Final     LYMPHOCYTES, ABSOLUTE   Date Value Ref Range Status   03/03/2017 1.28 0.60 - 4.20 10*3/mm3 Final     MONOCYTES, ABSOLUTE   Date Value Ref Range Status   03/03/2017 0.70 0.00 - 0.90 10*3/mm3 Final     EOSINOPHILS, ABSOLUTE   Date Value Ref Range Status   03/03/2017 0.14 0.00 - 0.70 10*3/mm3 Final     BASOPHILS, ABSOLUTE   Date Value Ref Range Status   03/03/2017 0.03 0.00 - 0.20 10*3/mm3 Final     IMMATURE GRANS, ABSOLUTE   Date Value Ref Range Status   03/03/2017 0.01 0.00 - 0.02 10*3/mm3 Final       Assessment & Plan  1. Right foot abscess,  cellulitis  2. Suspected osteomyelitis, right foot  3. DM with peripheral neuropathy.    Discussed patient with Dr Arriaga. Pt has worsening cellulitis and suspected abscess/soft tissue gas of the right foot. Will place NPO, plan for I&D this evening. Cont ABX per primary team. Will pack incision open, plan for formal amputation later in the week with Dr. Thompson.    I discussed the patients findings and my recommendations with patient and family          This document has been electronically signed by Tung Rosenthal DPM on March 3, 2017 10:04 PM     Tung Rosenthal DPM  03/03/17  9:32 PM

## 2017-03-04 NOTE — PROGRESS NOTES
"Pharmacokinetics by Pharmacy - Vancomycin    Emre Lisa is a 56 y.o. male  [Ht: 76\" (193 cm); Wt: 274 lb 4 oz (124 kg)]    Estimated Creatinine Clearance: 150.4 mL/min (by C-G formula based on Cr of 0.81).   Lab Results   Component Value Date    CREATININE 0.81 03/03/2017    CREATININE 0.88 03/02/2017    CREATININE 1.0 10/25/2016    CREATININE 0.9 07/13/2016    CREATININE 1.2 03/29/2016      Lab Results   Component Value Date    WBC 5.92 03/03/2017    WBC 9.21 03/02/2017    WBC 6.3 10/25/2016      Temp Readings from Last 1 Encounters:   03/04/17 96.9 °F (36.1 °C) (Oral)      Lab Results   Component Value Date    VANCOTROUGH 11.87 03/04/2017         Culture Results:  Microbiology Results (last 10 days)       Procedure Component Value - Date/Time      Wound Culture [[id number]]  (Abnormal) Collected:  03/02/17 1946    Lab Status:  Final result Specimen:  Wound from Foot, Right Updated:  03/04/17 0822     Wound Culture        Scant growth (1+) Streptococcus, Beta Hemolytic, Group B (A)      Susceptibility testing not required; These organisms are predictively sensitive to penicillin and Clindamycin therapy        Gram Stain Result Rare (1+) WBCs seen       No organisms seen     Blood Culture [[id number]]  (Normal) Collected:  03/02/17 1305    Lab Status:  Preliminary result Specimen:  Blood from Arm, Right Updated:  03/04/17 1401     Blood Culture No growth at 2 days     Blood Culture [[id number]]  (Normal) Collected:  03/02/17 1302    Lab Status:  Preliminary result Specimen:  Blood from Arm, Left Updated:  03/04/17 1401     Blood Culture No growth at 2 days                Indication for use: osteomyelitis    Current Vancomycin Dose:  2250 mg IVPB every 12 hours, day 3 of therapy.      Assessment/Plan:  Vancomycin trough subtherapeutic (trough goal 15-20).  Will increase dose to vancomycin 2250 mg IV q 12.  Pharmacy will continue to monitor renal function and adjust dose accordingly.    Sandor Meléndez III, [facility] "   03/04/17 2:56 PM

## 2017-03-04 NOTE — PROGRESS NOTES
LOS: 2 days   Patient Care Team:  Jamaal Bravo MD as PCP - General    Chief Complaint: right foot infection      History of Present Illness    Subjective   Patient seen at bedside resting comfortably. NAD. Denies n/v/f/c/sob/cp. Mr. Lisa is a patient of mine that was admitted for right foot infection 3/2/17. Was taken by Dr. Rosenthal on 3/2  for I&D as I I was unavailable. I will take over patients care now.     History taken from: patient    Objective     Vital Signs  Temp:  [96 °F (35.6 °C)-99.2 °F (37.3 °C)] 97.7 °F (36.5 °C)  Heart Rate:  [66-74] 74  Resp:  [18] 18  BP: ()/(62-76) 96/63    Objective     Constitutional: well developed, well nourished    HEENT: Normocephalic and atraumatic, normal hearing    Respiratory: Non labored respirations noted    Cardiovascular:    Skin temp is warm to warm from proximal tibia to distal digits  No erythema or edema noted     Musculoskeletal:   Equinus RLE    Dermatological:   Open wound noted to right lateral foot from previous I&D. No drainage, foul odor or periwound erythema noted.     Neurological:   Protective sensation absent  Sensation absent to light touch     Psychiatric: A&O x 3 with normal mood and affect. NAD.       Results Review:     I reviewed the patient's new clinical results.      Assessment/Plan     Active Problems:    Foot osteomyelitis, right    Nodule of groin    Type 2 diabetes mellitus with skin complication      Assessment & Plan  VSS, afebrile  No leukocytosis  S/p I&D right foot by Dr. Rosenthal on 3/2, foot is currently packed open  NPO after midnight for Tma and gastrocrecession RLE  Please do not hesitate to call with questions          This document has been electronically signed by Marco A Thompson DPM on March 4, 2017 11:23 AM

## 2017-03-04 NOTE — PLAN OF CARE
Problem: Patient Care Overview (Adult)  Goal: Plan of Care Review  Outcome: Ongoing (interventions implemented as appropriate)    03/04/17 0426   Coping/Psychosocial Response Interventions   Plan Of Care Reviewed With patient   Patient Care Overview   Progress no change   Outcome Evaluation   Outcome Summary/Follow up Plan Pt levemir held glucose 90 at 2200, now 77. only pain pt complains of is headache or neck stiffness this shift.       Goal: Adult Individualization and Mutuality  Outcome: Ongoing (interventions implemented as appropriate)  Goal: Discharge Needs Assessment  Outcome: Ongoing (interventions implemented as appropriate)    Problem: Cellulitis (Adult)  Goal: Signs and Symptoms of Listed Potential Problems Will be Absent or Manageable (Cellulitis)  Outcome: Ongoing (interventions implemented as appropriate)    Problem: Diabetes, Type 2 (Adult)  Goal: Signs and Symptoms of Listed Potential Problems Will be Absent or Manageable (Diabetes, Type 2)  Outcome: Ongoing (interventions implemented as appropriate)

## 2017-03-04 NOTE — PROGRESS NOTES
LOS: 2 days   Patient Care Team:  Jamaal Bravo MD as PCP - General        Subjective   Feels fine, in bed no chest pain no abdominal pain no nausea no vomiting      Objective     Vital Signs  Temp:  [96 °F (35.6 °C)-99.2 °F (37.3 °C)] 97.7 °F (36.5 °C)  Heart Rate:  [66-74] 74  Resp:  [18] 18  BP: ()/(62-76) 96/63    Objective:  General Appearance:  Comfortable.    Vital signs: (most recent): Blood pressure 105/50, pulse 76, temperature 98.3 °F (36.8 °C), temperature source Oral, resp. rate 18, SpO2 95 %.  Vital signs are normal.  No fever.     Lungs:  Normal respiratory rate and normal effort.  He is not in respiratory distress.  Breath sounds clear to auscultation.  No wheezes or rhonchi.    Heart: Normal rate.  Regular rhythm.  S1 normal and S2 normal.  n.   Abdomen: Abdomen is soft and non-distended.    Bowel sounds are normal.   There is no abdominal tenderness.  Extremities: Normal range of motion.  (Right foot wrapped,s/p I&D)        Results Review:     I reviewed the patient's new clinical results.     Lab Results (last 24 hours)     Procedure Component Value Units Date/Time    POC Glucose Fingerstick [[id number]]  (Normal) Collected:  03/03/17 0629    Specimen:  Blood Updated:  03/03/17 1301     Glucose 73 mg/dL       Meter: [id number]: [id number] KYRIE GILLYN       POC Glucose Fingerstick [[id number]]  (Normal) Collected:  03/03/17 1144    Specimen:  Blood Updated:  03/03/17 1305     Glucose 112 mg/dL       Meter: [id number]: [id number] SHANIQUA LOO       Blood Culture [[id number]]  (Normal) Collected:  03/02/17 1302    Specimen:  Blood from Arm, Left Updated:  03/03/17 1401     Blood Culture No growth at 24 hours     Blood Culture [[id number]]  (Normal) Collected:  03/02/17 1305    Specimen:  Blood from Arm, Right Updated:  03/03/17 1401     Blood Culture No growth at 24 hours     POC Glucose Fingerstick [[id number]]  (Normal) Collected:  03/03/17 2020    Specimen:  Blood  Updated:  03/03/17 1623     Glucose 84 mg/dL       Meter: [id number]: [id number] SHANIQUA LOO       POC Glucose Fingerstick [[id number]]  (Normal) Collected:  03/03/17 2108    Specimen:  Blood Updated:  03/03/17 2225     Glucose 90 mg/dL       Meter: [id number]: [id number] SISI BULLOCK       POC Glucose Fingerstick [[id number]]  (Normal) Collected:  03/04/17 0655    Specimen:  Blood Updated:  03/04/17 0706     Glucose 94 mg/dL       Meter: [id number]: [id number] [facility]       Wound Culture [[id number]]  (Abnormal) Collected:  03/02/17 1946    Specimen:  Wound from Foot, Right Updated:  03/04/17 0822     Wound Culture        Scant growth (1+) Streptococcus, Beta Hemolytic, Group B (A)      Susceptibility testing not required; These organisms are predictively sensitive to penicillin and Clindamycin therapy        Gram Stain Result Rare (1+) WBCs seen       No organisms seen         Imaging Results (last 24 hours)     Procedure Component Value Units Date/Time    XR Foot 3+ View Right [[id number]] Collected:  03/02/17 1756     Updated:  03/02/17 1801    Narrative:       Patient Name:  AMEE FONG  Patient ID:  [id number]   Ordering:  ALVIN ROYAL  Attending:  NITISH DILLARD  Referring:  ALVIN ROYAL  ------------------------------------------------    DATE OF EXAM: 3/2/2017 4:39 PM CST    PROCEDURE: XR FOOT 3 OR MORE VIEWS    INDICATION FOR PROCEDURE: 56 years old patient presents for  evaluation of right foot infection.    TECHNIQUE:  Three views of the right foot are obtained portably.    COMPARISON:  Radiographs of the right foot dated December 14, 2016.    FINDINGS:  Patient has had amputations of the phalanges of the  second, fourth and fifth toes. This is unchanged in the previous  study. There is soft tissue swelling of the foot particularly  dorsally.    There is severe hallux valgus. There are degenerative changes at  the first metatarsophalangeal joint. Erosions  are visible within  the first metatarsal head that suggests possibility of callus.  Erosions are also apparent of the proximal phalanx of the hallux.    There is soft tissue lucency near the fourth metatarsal head that  may represent an ulceration. There may be small erosions within  the third, and fourth metatarsal heads.    There is a posterior calcaneal enthesophyte, plantar calcaneal  enthesophyte and spurs. There may also be some chondrocalcinosis  of the plantar fascia. There are erosive and degenerative changes  at the intertarsal articulations and tarsal metatarsal  articulations.      Impression:         1.  Diffuse soft tissue swelling and evidence for ulceration near  the fourth metatarsal.   2.  Severe hallux valgus.   3.  Erosive and degenerative changes of the foot as described.   4.  Calcaneal spurs.    Electronically signed by:  Yana Waller MD  3/2/2017 5:59 PM [facility]  Workstation: Team My Mobile           Operative/Procedure Notes (most recent note)      Tung Rosenthal DPM at 3/2/2017  8:10 PM  Version 1 of 1         FOOT WOUND CLOSURE  Procedure Note    Emre Lisa  3/2/2017    Pre-op Diagnosis:   Acute osteomyelitis of right foot [M86.171]    Post-op Diagnosis:     Post-Op Diagnosis Codes:     * Acute osteomyelitis of right foot [M86.171]     * Foot abscess, right [L02.611]    Procedure/CPT® Codes:      Procedure(s):  INCISION AND DRAINAGE, RIGHT FOOT    Surgeon(s):  Tung Rosenthal DPM    Anesthesia: Monitor Anesthesia Care    Staff:   Circulator: Abi Almaguer RN  Scrub Person: Aniyah Mujica V  Assistant: Lisa Stiles MA    Estimated Blood Loss: * No values recorded between 3/2/2017  7:22 PM and 3/2/2017  8:07 PM *  Urine Voided: * No values recorded between 3/2/2017  7:22 PM and 3/2/2017  8:07 PM *    Specimens:                  ID Type Source Tests Collected by Time Destination   1 : wound culture right foot Wound Foot, Right WOUND CULTURE Tung Rosenthal DPM 3/2/2017 1946           Drains:           Findings: Consistent with diagnosis, dorsal lateral foot abscess with subcutaneous necrosis    Complications: none      Tung Rosenthal DPM     Date: 3/2/2017  Time: 8:10 PM         Electronically signed by Tung Rosenthal DPM at 3/2/2017  8:11 PM        Medication Review:     Current Facility-Administered Medications:   •  acetaminophen (TYLENOL) tablet 650 mg, 650 mg, Oral, Q4H PRN, Jamaal Bravo MD, 650 mg at 03/04/17 0801  •  bupivacaine (PF) (MARCAINE) 0.5 % injection, , , PRN, Tung Rosenthal DPM, 30 mL at 03/02/17 1942  •  dronedarone (MULTAQ) tablet 400 mg, 400 mg, Oral, Nightly, Jamaal Bravo MD, 400 mg at 03/03/17 2111  •  enoxaparin (LOVENOX) syringe 40 mg, 40 mg, Subcutaneous, Daily, Jamaal Bravo MD, 40 mg at 03/04/17 0802  •  fluticasone (FLONASE) 50 MCG/ACT nasal spray 1 spray, 1 spray, Each Nare, BID, Jamaal Bravo MD, 1 spray at 03/04/17 0802  •  gabapentin (NEURONTIN) capsule 300 mg, 300 mg, Oral, Q8H, Jamaal Bravo MD, 300 mg at 03/04/17 0531  •  HYDROcodone-acetaminophen (NORCO) 7.5-325 MG per tablet 1 tablet, 1 tablet, Oral, Q6H PRN, Tung Rosenthal DPM  •  insulin aspart (novoLOG) injection 0-9 Units, 0-9 Units, Subcutaneous, 4x Daily AC & at Bedtime, Jamaal Bravo MD, 0 Units at 03/02/17 1834  •  insulin detemir (LEVEMIR) injection 10 Units, 10 Units, Subcutaneous, Nightly, Jamaal Bravo MD, 10 Units at 03/03/17 2115  •  magnesium oxide (MAGOX) tablet 400 mg, 400 mg, Oral, Daily, Jamaal Braov MD, 400 mg at 03/04/17 0802  •  metoprolol tartrate (LOPRESSOR) half tablet 12.5 mg, 12.5 mg, Oral, BID, Jamaal Bravo MD, 12.5 mg at 03/03/17 1729  •  Morphine sulfate (PF) injection 2 mg, 2 mg, Intravenous, Q2H PRN, Jamaal Bravo MD  •  pantoprazole (PROTONIX) EC tablet 40 mg, 40 mg, Oral, Q AM, Jamaal Bravo MD, 40 mg at 03/04/17 0540  •  Pharmacy to dose vancomycin, , Does not apply, Continuous PRN, Jamaal Bravo,  MD  •  sodium chloride 0.9 % flush 1-10 mL, 1-10 mL, Intravenous, PRN, Jamaal Bravo MD, 10 mL at 03/02/17 1835  •  valsartan (DIOVAN) tablet 40 mg, 40 mg, Oral, Nightly, Jamaal Bravo MD, 40 mg at 03/03/17 2111  •  vancomycin (VANCOCIN) 1,750 mg in sodium chloride 0.9 % 500 mL IVPB, 15 mg/kg, Intravenous, Q12H, Jamaal Bravo MD, 1,750 mg at 03/04/17 0253      Assessment/Plan     Active Problems:    Foot osteomyelitis, right    Nodule of groin    Type 2 diabetes mellitus with skin complication    Assessment:    Condition: In stable condition.  Improving.       Plan:   Encourage ambulation.         1. POD #2 I&D Right foot ulceration/Osteomyelitis: . Appreciate Podiatry input , possible repeat surgery tomorrow   2. T2 DM:  On ISS  3. GERD: without esophagitis : continue ppi    Thien Diggs MD  03/04/17  11:11 AM

## 2017-03-05 ENCOUNTER — APPOINTMENT (OUTPATIENT)
Dept: GENERAL RADIOLOGY | Facility: HOSPITAL | Age: 57
End: 2017-03-05

## 2017-03-05 ENCOUNTER — ANESTHESIA (OUTPATIENT)
Dept: PERIOP | Facility: HOSPITAL | Age: 57
End: 2017-03-05

## 2017-03-05 LAB
ALBUMIN SERPL-MCNC: 4 G/DL (ref 3.4–4.8)
ALBUMIN/GLOB SERPL: 1.1 G/DL (ref 1.1–1.8)
ALP SERPL-CCNC: 53 U/L (ref 38–126)
ALT SERPL W P-5'-P-CCNC: 24 U/L (ref 21–72)
ANION GAP SERPL CALCULATED.3IONS-SCNC: 10 MMOL/L (ref 5–15)
AST SERPL-CCNC: 23 U/L (ref 17–59)
BASOPHILS # BLD AUTO: 0.05 10*3/MM3 (ref 0–0.2)
BASOPHILS NFR BLD AUTO: 1 % (ref 0–2)
BILIRUB SERPL-MCNC: 0.7 MG/DL (ref 0.2–1.3)
BUN BLD-MCNC: 20 MG/DL (ref 7–21)
BUN/CREAT SERPL: 22 (ref 7–25)
CALCIUM SPEC-SCNC: 9.2 MG/DL (ref 8.4–10.2)
CHLORIDE SERPL-SCNC: 102 MMOL/L (ref 95–110)
CLUMPED PLATELETS: NORMAL
CO2 SERPL-SCNC: 29 MMOL/L (ref 22–31)
CREAT BLD-MCNC: 0.91 MG/DL (ref 0.7–1.3)
DEPRECATED RDW RBC AUTO: 46.6 FL (ref 35.1–43.9)
EOSINOPHIL # BLD AUTO: 0.17 10*3/MM3 (ref 0–0.7)
EOSINOPHIL NFR BLD AUTO: 3.2 % (ref 0–7)
ERYTHROCYTE [DISTWIDTH] IN BLOOD BY AUTOMATED COUNT: 14.4 % (ref 11.5–14.5)
GFR SERPL CREATININE-BSD FRML MDRD: 87 ML/MIN/1.73 (ref 60–130)
GLOBULIN UR ELPH-MCNC: 3.7 GM/DL (ref 2.3–3.5)
GLUCOSE BLD-MCNC: 92 MG/DL (ref 60–100)
GLUCOSE BLDC GLUCOMTR-MCNC: 101 MG/DL (ref 70–130)
GLUCOSE BLDC GLUCOMTR-MCNC: 91 MG/DL (ref 70–130)
GLUCOSE BLDC GLUCOMTR-MCNC: 94 MG/DL (ref 70–130)
HCT VFR BLD AUTO: 41.5 % (ref 39–49)
HGB BLD-MCNC: 13.6 G/DL (ref 13.7–17.3)
IMM GRANULOCYTES # BLD: 0.01 10*3/MM3 (ref 0–0.02)
IMM GRANULOCYTES NFR BLD: 0.2 % (ref 0–0.5)
LYMPHOCYTES # BLD AUTO: 1.72 10*3/MM3 (ref 0.6–4.2)
LYMPHOCYTES NFR BLD AUTO: 32.8 % (ref 10–50)
MCH RBC QN AUTO: 28.6 PG (ref 26.5–34)
MCHC RBC AUTO-ENTMCNC: 32.8 G/DL (ref 31.5–36.3)
MCV RBC AUTO: 87.4 FL (ref 80–98)
MONOCYTES # BLD AUTO: 0.51 10*3/MM3 (ref 0–0.9)
MONOCYTES NFR BLD AUTO: 9.7 % (ref 0–12)
NEUTROPHILS # BLD AUTO: 2.78 10*3/MM3 (ref 2–8.6)
NEUTROPHILS NFR BLD AUTO: 53.1 % (ref 37–80)
NRBC BLD MANUAL-RTO: 0 /100 WBC (ref 0–0)
PLAT MORPH BLD: NORMAL
PLATELET # BLD AUTO: 167 10*3/MM3 (ref 150–450)
PMV BLD AUTO: 11.2 FL (ref 8–12)
POTASSIUM BLD-SCNC: 4.5 MMOL/L (ref 3.5–5.1)
PROT SERPL-MCNC: 7.7 G/DL (ref 6.3–8.6)
RBC # BLD AUTO: 4.75 10*6/MM3 (ref 4.37–5.74)
RBC MORPH BLD: NORMAL
SODIUM BLD-SCNC: 141 MMOL/L (ref 137–145)
WBC MORPH BLD: NORMAL
WBC NRBC COR # BLD: 5.24 10*3/MM3 (ref 3.2–9.8)

## 2017-03-05 PROCEDURE — 85007 BL SMEAR W/DIFF WBC COUNT: CPT | Performed by: INTERNAL MEDICINE

## 2017-03-05 PROCEDURE — 25010000002 PROPOFOL 10 MG/ML EMULSION: Performed by: NURSE ANESTHETIST, CERTIFIED REGISTERED

## 2017-03-05 PROCEDURE — 27687 REVISION OF CALF TENDON: CPT | Performed by: PODIATRIST

## 2017-03-05 PROCEDURE — 76000 FLUOROSCOPY <1 HR PHYS/QHP: CPT

## 2017-03-05 PROCEDURE — 25010000002 ONDANSETRON PER 1 MG: Performed by: NURSE ANESTHETIST, CERTIFIED REGISTERED

## 2017-03-05 PROCEDURE — 0Y6M0ZC DETACHMENT AT RIGHT FOOT, PARTIAL 3RD RAY, OPEN APPROACH: ICD-10-PCS | Performed by: PODIATRIST

## 2017-03-05 PROCEDURE — 28805 AMPUTATION THRU METATARSAL: CPT | Performed by: PODIATRIST

## 2017-03-05 PROCEDURE — 88305 TISSUE EXAM BY PATHOLOGIST: CPT | Performed by: PODIATRIST

## 2017-03-05 PROCEDURE — 0Y6M0Z9 DETACHMENT AT RIGHT FOOT, PARTIAL 1ST RAY, OPEN APPROACH: ICD-10-PCS | Performed by: PODIATRIST

## 2017-03-05 PROCEDURE — 25010000002 PHENYLEPHRINE PER 1 ML: Performed by: NURSE ANESTHETIST, CERTIFIED REGISTERED

## 2017-03-05 PROCEDURE — 85025 COMPLETE CBC W/AUTO DIFF WBC: CPT | Performed by: INTERNAL MEDICINE

## 2017-03-05 PROCEDURE — 80053 COMPREHEN METABOLIC PANEL: CPT | Performed by: INTERNAL MEDICINE

## 2017-03-05 PROCEDURE — 82962 GLUCOSE BLOOD TEST: CPT

## 2017-03-05 PROCEDURE — 25010000002 VANCOMYCIN PER 500 MG: Performed by: FAMILY MEDICINE

## 2017-03-05 PROCEDURE — 25010000002 FENTANYL CITRATE (PF) 100 MCG/2ML SOLUTION: Performed by: NURSE ANESTHETIST, CERTIFIED REGISTERED

## 2017-03-05 PROCEDURE — 0LSN0ZZ REPOSITION RIGHT LOWER LEG TENDON, OPEN APPROACH: ICD-10-PCS | Performed by: PODIATRIST

## 2017-03-05 PROCEDURE — 73630 X-RAY EXAM OF FOOT: CPT

## 2017-03-05 PROCEDURE — 88305 TISSUE EXAM BY PATHOLOGIST: CPT | Performed by: PATHOLOGY

## 2017-03-05 PROCEDURE — 25010000002 MIDAZOLAM PER 1 MG: Performed by: NURSE ANESTHETIST, CERTIFIED REGISTERED

## 2017-03-05 PROCEDURE — 25010000002 HYDROMORPHONE PER 4 MG: Performed by: NURSE ANESTHETIST, CERTIFIED REGISTERED

## 2017-03-05 RX ORDER — FLUMAZENIL 0.1 MG/ML
0.2 INJECTION INTRAVENOUS AS NEEDED
Status: DISCONTINUED | OUTPATIENT
Start: 2017-03-05 | End: 2017-03-05 | Stop reason: HOSPADM

## 2017-03-05 RX ORDER — MAGNESIUM OXIDE 400 MG/1
400 TABLET ORAL DAILY
Status: DISCONTINUED | OUTPATIENT
Start: 2017-03-05 | End: 2017-03-05 | Stop reason: SDUPTHER

## 2017-03-05 RX ORDER — LABETALOL HYDROCHLORIDE 5 MG/ML
5 INJECTION, SOLUTION INTRAVENOUS
Status: DISCONTINUED | OUTPATIENT
Start: 2017-03-05 | End: 2017-03-05 | Stop reason: HOSPADM

## 2017-03-05 RX ORDER — ACETAMINOPHEN 325 MG/1
650 TABLET ORAL ONCE AS NEEDED
Status: DISCONTINUED | OUTPATIENT
Start: 2017-03-05 | End: 2017-03-05 | Stop reason: HOSPADM

## 2017-03-05 RX ORDER — PROPOFOL 10 MG/ML
VIAL (ML) INTRAVENOUS AS NEEDED
Status: DISCONTINUED | OUTPATIENT
Start: 2017-03-05 | End: 2017-03-05 | Stop reason: SURG

## 2017-03-05 RX ORDER — FENTANYL CITRATE 50 UG/ML
INJECTION, SOLUTION INTRAMUSCULAR; INTRAVENOUS AS NEEDED
Status: DISCONTINUED | OUTPATIENT
Start: 2017-03-05 | End: 2017-03-05 | Stop reason: SURG

## 2017-03-05 RX ORDER — MIDAZOLAM HYDROCHLORIDE 1 MG/ML
INJECTION INTRAMUSCULAR; INTRAVENOUS AS NEEDED
Status: DISCONTINUED | OUTPATIENT
Start: 2017-03-05 | End: 2017-03-05 | Stop reason: SURG

## 2017-03-05 RX ORDER — HYDRALAZINE HYDROCHLORIDE 20 MG/ML
5 INJECTION INTRAMUSCULAR; INTRAVENOUS
Status: DISCONTINUED | OUTPATIENT
Start: 2017-03-05 | End: 2017-03-05 | Stop reason: HOSPADM

## 2017-03-05 RX ORDER — DEXAMETHASONE SODIUM PHOSPHATE 4 MG/ML
8 INJECTION, SOLUTION INTRA-ARTICULAR; INTRALESIONAL; INTRAMUSCULAR; INTRAVENOUS; SOFT TISSUE ONCE AS NEEDED
Status: DISCONTINUED | OUTPATIENT
Start: 2017-03-05 | End: 2017-03-05 | Stop reason: HOSPADM

## 2017-03-05 RX ORDER — DIPHENHYDRAMINE HYDROCHLORIDE 50 MG/ML
12.5 INJECTION INTRAMUSCULAR; INTRAVENOUS
Status: DISCONTINUED | OUTPATIENT
Start: 2017-03-05 | End: 2017-03-05 | Stop reason: HOSPADM

## 2017-03-05 RX ORDER — ACETAMINOPHEN 650 MG/1
650 SUPPOSITORY RECTAL ONCE AS NEEDED
Status: DISCONTINUED | OUTPATIENT
Start: 2017-03-05 | End: 2017-03-05 | Stop reason: HOSPADM

## 2017-03-05 RX ORDER — ONDANSETRON 2 MG/ML
INJECTION INTRAMUSCULAR; INTRAVENOUS AS NEEDED
Status: DISCONTINUED | OUTPATIENT
Start: 2017-03-05 | End: 2017-03-05 | Stop reason: SURG

## 2017-03-05 RX ORDER — SODIUM CHLORIDE, SODIUM GLUCONATE, SODIUM ACETATE, POTASSIUM CHLORIDE, AND MAGNESIUM CHLORIDE 526; 502; 368; 37; 30 MG/100ML; MG/100ML; MG/100ML; MG/100ML; MG/100ML
INJECTION, SOLUTION INTRAVENOUS CONTINUOUS PRN
Status: DISCONTINUED | OUTPATIENT
Start: 2017-03-05 | End: 2017-03-05 | Stop reason: SURG

## 2017-03-05 RX ORDER — NALOXONE HCL 0.4 MG/ML
0.2 VIAL (ML) INJECTION AS NEEDED
Status: DISCONTINUED | OUTPATIENT
Start: 2017-03-05 | End: 2017-03-05 | Stop reason: HOSPADM

## 2017-03-05 RX ORDER — ONDANSETRON 2 MG/ML
4 INJECTION INTRAMUSCULAR; INTRAVENOUS ONCE AS NEEDED
Status: DISCONTINUED | OUTPATIENT
Start: 2017-03-05 | End: 2017-03-05 | Stop reason: HOSPADM

## 2017-03-05 RX ORDER — MAGNESIUM HYDROXIDE 1200 MG/15ML
LIQUID ORAL AS NEEDED
Status: DISCONTINUED | OUTPATIENT
Start: 2017-03-05 | End: 2017-03-09 | Stop reason: HOSPADM

## 2017-03-05 RX ORDER — BACITRACIN 50000 [USP'U]/1
INJECTION, POWDER, LYOPHILIZED, FOR SOLUTION INTRAMUSCULAR AS NEEDED
Status: DISCONTINUED | OUTPATIENT
Start: 2017-03-05 | End: 2017-03-09 | Stop reason: HOSPADM

## 2017-03-05 RX ORDER — METOCLOPRAMIDE HYDROCHLORIDE 5 MG/ML
10 INJECTION INTRAMUSCULAR; INTRAVENOUS ONCE AS NEEDED
Status: DISCONTINUED | OUTPATIENT
Start: 2017-03-05 | End: 2017-03-05 | Stop reason: HOSPADM

## 2017-03-05 RX ADMIN — METOPROLOL TARTRATE 12.5 MG: 25 TABLET, FILM COATED ORAL at 05:25

## 2017-03-05 RX ADMIN — EPHEDRINE SULFATE 10 MG: 50 INJECTION INTRAMUSCULAR; INTRAVENOUS; SUBCUTANEOUS at 08:25

## 2017-03-05 RX ADMIN — VANCOMYCIN HYDROCHLORIDE 2250 MG: 5 INJECTION, POWDER, LYOPHILIZED, FOR SOLUTION INTRAVENOUS at 14:26

## 2017-03-05 RX ADMIN — HYDROMORPHONE HYDROCHLORIDE 0.5 MG: 1 INJECTION, SOLUTION INTRAMUSCULAR; INTRAVENOUS; SUBCUTANEOUS at 11:20

## 2017-03-05 RX ADMIN — PROPOFOL 100 MG: 10 INJECTION, EMULSION INTRAVENOUS at 08:07

## 2017-03-05 RX ADMIN — SODIUM CHLORIDE, SODIUM GLUCONATE, SODIUM ACETATE, POTASSIUM CHLORIDE, AND MAGNESIUM CHLORIDE: 526; 502; 368; 37; 30 INJECTION, SOLUTION INTRAVENOUS at 07:58

## 2017-03-05 RX ADMIN — ONDANSETRON 4 MG: 2 INJECTION INTRAMUSCULAR; INTRAVENOUS at 08:33

## 2017-03-05 RX ADMIN — VALSARTAN 40 MG: 40 TABLET, FILM COATED ORAL at 20:56

## 2017-03-05 RX ADMIN — EPHEDRINE SULFATE 10 MG: 50 INJECTION INTRAMUSCULAR; INTRAVENOUS; SUBCUTANEOUS at 08:31

## 2017-03-05 RX ADMIN — PROPOFOL 200 MG: 10 INJECTION, EMULSION INTRAVENOUS at 08:05

## 2017-03-05 RX ADMIN — PANTOPRAZOLE SODIUM 40 MG: 40 TABLET, DELAYED RELEASE ORAL at 07:19

## 2017-03-05 RX ADMIN — DRONEDARONE 400 MG: 400 TABLET, FILM COATED ORAL at 20:58

## 2017-03-05 RX ADMIN — FENTANYL CITRATE 25 MCG: 50 INJECTION, SOLUTION INTRAMUSCULAR; INTRAVENOUS at 08:41

## 2017-03-05 RX ADMIN — HYDROCODONE BITARTRATE AND ACETAMINOPHEN 1 TABLET: 7.5; 325 TABLET ORAL at 20:58

## 2017-03-05 RX ADMIN — MIDAZOLAM 2 MG: 1 INJECTION INTRAMUSCULAR; INTRAVENOUS at 08:00

## 2017-03-05 RX ADMIN — GABAPENTIN 300 MG: 300 CAPSULE ORAL at 23:10

## 2017-03-05 RX ADMIN — FENTANYL CITRATE 25 MCG: 50 INJECTION, SOLUTION INTRAMUSCULAR; INTRAVENOUS at 08:49

## 2017-03-05 RX ADMIN — PHENYLEPHRINE HYDROCHLORIDE 100 MCG: 10 INJECTION INTRAVENOUS at 08:30

## 2017-03-05 RX ADMIN — ACETAMINOPHEN 650 MG: 325 TABLET ORAL at 00:00

## 2017-03-05 RX ADMIN — VANCOMYCIN HYDROCHLORIDE 2250 MG: 5 INJECTION, POWDER, LYOPHILIZED, FOR SOLUTION INTRAVENOUS at 03:14

## 2017-03-05 RX ADMIN — FENTANYL CITRATE 50 MCG: 50 INJECTION, SOLUTION INTRAMUSCULAR; INTRAVENOUS at 08:13

## 2017-03-05 RX ADMIN — GABAPENTIN 300 MG: 300 CAPSULE ORAL at 14:25

## 2017-03-05 NOTE — PROGRESS NOTES
LOS: 3 days   Patient Care Team:  Jamaal Bravo MD as PCP - General        Subjective    Postop today, no chest pain, no leg pain, no abdominal pain no nausea no vomiting      Objective     Vital Signs  Temp:  [96.9 °F (36.1 °C)-99.6 °F (37.6 °C)] 97.5 °F (36.4 °C)  Heart Rate:  [68-82] 69  Resp:  [12-18] 18  BP: ()/(53-70) 99/62    Objective:  General Appearance:  Comfortable.      Lungs:  Normal respiratory rate and normal effort.  He is not in respiratory distress.  Breath sounds clear to auscultation.  No wheezes or rhonchi.    Heart: Normal rate.  Regular rhythm.  S1 normal and S2 normal.   no rubs murmurs or gallops.   Abdomen: Abdomen is soft and non-distended.    Bowel sounds are normal.  no abdominal tenderness.    Extremities: Normal range of motion.  (Right foot wrapped,s/p I&D)        Results Review:     I reviewed the patient's new clinical results.     Lab Results (last 24 hours)     Procedure Component Value Units Date/Time    Vancomycin, Trough [[id number]]  (Normal) Collected:  03/04/17 1330    Specimen:  Blood Updated:  03/04/17 1359     Vancomycin Trough 11.87 mcg/mL     Blood Culture [[id number]]  (Normal) Collected:  03/02/17 1302    Specimen:  Blood from Arm, Left Updated:  03/04/17 1401     Blood Culture No growth at 2 days     Blood Culture [[id number]]  (Normal) Collected:  03/02/17 1305    Specimen:  Blood from Arm, Right Updated:  03/04/17 1401     Blood Culture No growth at 2 days     POC Glucose Fingerstick [[id number]]  (Normal) Collected:  03/04/17 1724    Specimen:  Blood Updated:  03/05/17 0321     Glucose 91 mg/dL       Meter: [id number]: [id number] MCGOVERN CINDA       POC Glucose Fingerstick [[id number]]  (Normal) Collected:  03/04/17 2052    Specimen:  Blood Updated:  03/05/17 0322     Glucose 94 mg/dL       Meter: [id number]: [id number] SISI BULLOCK       Comprehensive Metabolic Panel [[id number]]  (Abnormal) Collected:  03/05/17 0619    Specimen:  Blood  Updated:  03/05/17 0628     Glucose 92 mg/dL      BUN 20 mg/dL      Creatinine 0.91 mg/dL      Sodium 141 mmol/L      Potassium 4.5 mmol/L      Chloride 102 mmol/L      CO2 29.0 mmol/L      Calcium 9.2 mg/dL      Total Protein 7.7 g/dL      Albumin 4.00 g/dL      ALT (SGPT) 24 U/L      AST (SGOT) 23 U/L      Alkaline Phosphatase 53 U/L      Total Bilirubin 0.7 mg/dL      eGFR Non African Amer 87 mL/min/1.73      Globulin 3.7 (H) gm/dL      A/G Ratio 1.1 g/dL      BUN/Creatinine Ratio 22.0      Anion Gap 10.0 mmol/L     POC Glucose Fingerstick [[id number]]  (Normal) Collected:  03/05/17 1049    Specimen:  Blood Updated:  03/05/17 1100     Glucose 101 mg/dL       Meter: [id number]: [id number] DONNA ASHLEY           Imaging Results (last 24 hours)     Procedure Component Value Units Date/Time    XR Foot 3+ View Right [[id number]] Collected:  03/02/17 1756     Updated:  03/02/17 1801    Narrative:       Patient Name:  AMEE FONG  Patient ID:  [id number]   Ordering:  ALVIN ROYAL  Attending:  NITISH DILLARD  Referring:  ALVIN ROYAL  ------------------------------------------------    DATE OF EXAM: 3/2/2017 4:39 PM CST    PROCEDURE: XR FOOT 3 OR MORE VIEWS    INDICATION FOR PROCEDURE: 56 years old patient presents for  evaluation of right foot infection.    TECHNIQUE:  Three views of the right foot are obtained portably.    COMPARISON:  Radiographs of the right foot dated December 14, 2016.    FINDINGS:  Patient has had amputations of the phalanges of the  second, fourth and fifth toes. This is unchanged in the previous  study. There is soft tissue swelling of the foot particularly  dorsally.    There is severe hallux valgus. There are degenerative changes at  the first metatarsophalangeal joint. Erosions are visible within  the first metatarsal head that suggests possibility of callus.  Erosions are also apparent of the proximal phalanx of the hallux.    There is soft tissue lucency near the  fourth metatarsal head that  may represent an ulceration. There may be small erosions within  the third, and fourth metatarsal heads.    There is a posterior calcaneal enthesophyte, plantar calcaneal  enthesophyte and spurs. There may also be some chondrocalcinosis  of the plantar fascia. There are erosive and degenerative changes  at the intertarsal articulations and tarsal metatarsal  articulations.      Impression:         1.  Diffuse soft tissue swelling and evidence for ulceration near  the fourth metatarsal.   2.  Severe hallux valgus.   3.  Erosive and degenerative changes of the foot as described.   4.  Calcaneal spurs.    Electronically signed by:  Yana Waller MD  3/2/2017 5:59 PM [facility]  Workstation: [email or web address]           Operative/Procedure Notes (most recent note)      Tung Rosenthal DPM at 3/2/2017  8:10 PM  Version 1 of 1         FOOT WOUND CLOSURE  Procedure Note    Emre Lisa  3/2/2017    Pre-op Diagnosis:   Acute osteomyelitis of right foot [M86.171]    Post-op Diagnosis:     Post-Op Diagnosis Codes:     * Acute osteomyelitis of right foot [M86.171]     * Foot abscess, right [L02.611]    Procedure/CPT® Codes:      Procedure(s):  INCISION AND DRAINAGE, RIGHT FOOT    Surgeon(s):  Tung Rosenthal DPM    Anesthesia: Monitor Anesthesia Care    Staff:   Circulator: Abi Almaguer RN  Scrub Person: Aniyah Mujica V  Assistant: Lisa Stiles MA    Estimated Blood Loss: * No values recorded between 3/2/2017  7:22 PM and 3/2/2017  8:07 PM *  Urine Voided: * No values recorded between 3/2/2017  7:22 PM and 3/2/2017  8:07 PM *    Specimens:                  ID Type Source Tests Collected by Time Destination   1 : wound culture right foot Wound Foot, Right WOUND CULTURE Tung Rosenthal DPM 3/2/2017 1946          Drains:           Findings: Consistent with diagnosis, dorsal lateral foot abscess with subcutaneous necrosis    Complications: none      Tung Rosenthal DPM     Date: 3/2/2017   Time: 8:10 PM         Electronically signed by Tung Rosenthal DPM at 3/2/2017  8:11 PM        Medication Review:     Current Facility-Administered Medications:   •  acetaminophen (TYLENOL) tablet 650 mg, 650 mg, Oral, Q4H PRN, Jamaal Bravo MD, 650 mg at 03/05/17 0000  •  bacitracin injection, , , PRN, Marco A Thompson DPM, 50,000 Units at 03/05/17 0839  •  bupivacaine (PF) (MARCAINE) 0.5 % injection, , , PRN, Tung Rosenthal DPM, 30 mL at 03/05/17 0839  •  dronedarone (MULTAQ) tablet 400 mg, 400 mg, Oral, Nightly, Jamaal Bravo MD, 400 mg at 03/04/17 2056  •  enoxaparin (LOVENOX) syringe 40 mg, 40 mg, Subcutaneous, Daily, Jamaal Bravo MD, 40 mg at 03/04/17 0802  •  fluticasone (FLONASE) 50 MCG/ACT nasal spray 1 spray, 1 spray, Each Nare, BID, Jamaal Bravo MD, 1 spray at 03/04/17 0802  •  gabapentin (NEURONTIN) capsule 300 mg, 300 mg, Oral, Q8H, Jamaal Bravo MD, 300 mg at 03/04/17 2058  •  HYDROcodone-acetaminophen (NORCO) 7.5-325 MG per tablet 1 tablet, 1 tablet, Oral, Q6H PRN, Tung Rosenthal DPM  •  insulin aspart (novoLOG) injection 0-9 Units, 0-9 Units, Subcutaneous, 4x Daily AC & at Bedtime, Jamaal Bravo MD, 0 Units at 03/02/17 1834  •  insulin detemir (LEVEMIR) injection 10 Units, 10 Units, Subcutaneous, Nightly, Jamaal Bravo MD, 10 Units at 03/03/17 2115  •  magnesium oxide (MAGOX) tablet 400 mg, 400 mg, Oral, Daily, Jamaal Bravo MD, 400 mg at 03/04/17 0802  •  metoprolol tartrate (LOPRESSOR) half tablet 12.5 mg, 12.5 mg, Oral, BID, Jamaal Bravo MD, 12.5 mg at 03/04/17 1725  •  Morphine sulfate (PF) injection 2 mg, 2 mg, Intravenous, Q2H PRN, Jamaal Bravo MD  •  pantoprazole (PROTONIX) EC tablet 40 mg, 40 mg, Oral, Q AM, Jamaal Bravo MD, 40 mg at 03/04/17 0531  •  Pharmacy to dose vancomycin, , Does not apply, Continuous PRN, Jamaal Bravo MD  •  sodium chloride (NS) irrigation solution, , , PRN, Marco A Thompson DPM, 2,000 mL at 03/05/17  0841  •  sodium chloride 0.9 % flush 1-10 mL, 1-10 mL, Intravenous, PRN, Jamaal Bravo MD, 10 mL at 03/02/17 1835  •  valsartan (DIOVAN) tablet 40 mg, 40 mg, Oral, Nightly, Jamaal Bravo MD, 40 mg at 03/04/17 2055  •  vancomycin (VANCOCIN) 2,250 mg in sodium chloride 0.9 % 500 mL IVPB, 2,250 mg, Intravenous, Q12H, Jamaal Bravo MD, 2,250 mg at 03/05/17 0314      Assessment/Plan     Active Problems:    Foot osteomyelitis, right    Nodule of groin    Type 2 diabetes mellitus with skin complication    Assessment:    Condition: In stable condition.  Improving.       Plan:   Encourage ambulation.         1. POD #3 I&D Right foot ulceration/Osteomyelitis:      RIGHT AMPUTATION TRANSMETATRASAL , RECESSION GASTROCNEMOUS today      Appreciate Podiatry input continue IV antibiotics   2. DM2:  Continue On ISS  3. GERD: without esophagitis : continue ppi    Thien Diggs MD  03/05/17  12:17 PM

## 2017-03-05 NOTE — PROGRESS NOTES
"Pharmacokinetics by Pharmacy - Vancomycin    Emre Lisa is a 56 y.o. male  [Ht: 76\" (193 cm); Wt: 274 lb (124 kg)]    Estimated Creatinine Clearance: 133.9 mL/min (by C-G formula based on Cr of 0.91).   Lab Results   Component Value Date    CREATININE 0.91 03/05/2017    CREATININE 0.81 03/03/2017    CREATININE 0.88 03/02/2017    CREATININE 1.0 10/25/2016    CREATININE 0.9 07/13/2016    CREATININE 1.2 03/29/2016      Lab Results   Component Value Date    WBC 5.92 03/03/2017    WBC 9.21 03/02/2017    WBC 6.3 10/25/2016      Temp Readings from Last 1 Encounters:   03/05/17 97.5 °F (36.4 °C)      Lab Results   Component Value Date    VANCOTROUGH 11.87 03/04/2017         Culture Results:  Microbiology Results (last 10 days)       Procedure Component Value - Date/Time      Wound Culture [[id number]]  (Abnormal) Collected:  03/02/17 1946    Lab Status:  Final result Specimen:  Wound from Foot, Right Updated:  03/04/17 0822     Wound Culture        Scant growth (1+) Streptococcus, Beta Hemolytic, Group B (A)      Susceptibility testing not required; These organisms are predictively sensitive to penicillin and Clindamycin therapy        Gram Stain Result Rare (1+) WBCs seen       No organisms seen     Blood Culture [[id number]]  (Normal) Collected:  03/02/17 1305    Lab Status:  Preliminary result Specimen:  Blood from Arm, Right Updated:  03/04/17 1401     Blood Culture No growth at 2 days     Blood Culture [[id number]]  (Normal) Collected:  03/02/17 1302    Lab Status:  Preliminary result Specimen:  Blood from Arm, Left Updated:  03/04/17 1401     Blood Culture No growth at 2 days                Indication for use: osteomyelitis    Current Vancomycin Dose:  2250 mg IVPB every 12 hours, day 4 of therapy.      Assessment/Plan:  Pharmacy will continue to monitor renal function and adjust dose accordingly.    Sandor Meléndez III, [facility]   03/05/17 1:02 PM   "

## 2017-03-05 NOTE — ANESTHESIA POSTPROCEDURE EVALUATION
Patient: Emre Lisa    Procedure Summary     Date Anesthesia Start Anesthesia Stop Room / Location    03/05/17 0801 1043 [facility] OR [facility] / [facility]       Procedure Diagnosis Surgeon Provider    RIGHT AMPUTATION TRANSMETATRASAL , RECESSION GASTROCNEMOUS (Right Fingers) Acute osteomyelitis of right foot  (Acute osteomyelitis of right foot [M86.171]) CHRISTIAN Shafer MD          Anesthesia Type: MAC  Last vitals  /55 (03/05/17 1137)    Temp 97.4 °F (36.3 °C) (03/05/17 1137)    Pulse 70 (03/05/17 1137)   Resp 18 (03/05/17 1137)    SpO2 100 % (03/05/17 1137)      Post Anesthesia Care and Evaluation    Patient location during evaluation: PACU  Patient participation: complete - patient participated  Level of consciousness: awake and awake and alert  Pain score: 0  Pain management: satisfactory to patient  Airway patency: patent  Anesthetic complications: No anesthetic complications  PONV Status: none  Cardiovascular status: acceptable and stable  Respiratory status: acceptable, room air, unassisted and spontaneous ventilation  Hydration status: acceptable

## 2017-03-05 NOTE — PLAN OF CARE
Problem: Patient Care Overview (Adult)  Goal: Plan of Care Review  Outcome: Ongoing (interventions implemented as appropriate)    03/05/17 1131   Coping/Psychosocial Response Interventions   Plan Of Care Reviewed With patient   Patient Care Overview   Progress improving   Outcome Evaluation   Outcome Summary/Follow up Plan vss. vicente ice chips. discomfort tolerable. xray complete. ready for transport to .       Goal: Adult Individualization and Mutuality  Outcome: Ongoing (interventions implemented as appropriate)    Problem: Perioperative Period (Adult)  Goal: Signs and Symptoms of Listed Potential Problems Will be Absent or Manageable (Perioperative Period)  Outcome: Ongoing (interventions implemented as appropriate)

## 2017-03-05 NOTE — PLAN OF CARE
Problem: Patient Care Overview (Adult)  Goal: Plan of Care Review  Outcome: Ongoing (interventions implemented as appropriate)    03/05/17 0351   Coping/Psychosocial Response Interventions   Plan Of Care Reviewed With patient   Patient Care Overview   Progress no change   Outcome Evaluation   Outcome Summary/Follow up Plan levemir held, glucose at 2200 94, snack given before midnight. Npo since 0000 3/5 for surgery. consent not signed, risks and benefits not documented.       Goal: Adult Individualization and Mutuality  Outcome: Ongoing (interventions implemented as appropriate)

## 2017-03-05 NOTE — BRIEF OP NOTE
AMPUTATION DIGIT  Procedure Note    Emre Lisa  3/2/2017 - 3/5/2017    Pre-op Diagnosis:   Acute osteomyelitis of right foot [M86.171]    Post-op Diagnosis:     Post-Op Diagnosis Codes:     * Acute osteomyelitis of right foot [M86.171]    Procedure/CPT® Codes:      Procedure(s):  RIGHT AMPUTATION TRANSMETATRASAL , RECESSION GASTROCNEMOUS    Surgeon(s):  Marco A Thompson DPM    Anesthesia: General    Staff:   Circulator: Josefina Jones RN  Scrub Person: Sofia Houston  Assistant: Megan Pate    Estimated Blood Loss: 50 mL  Urine Voided: * No values recorded between 3/5/2017  8:01 AM and 3/5/2017 10:30 AM *    Specimens:                  ID Type Source Tests Collected by Time Destination   A : toes right foot Tissue Toe, Right TISSUE EXAM Marco A Thompson DPM 3/5/2017 0947          Drains:   Drain/Device Site 03/05/17 0858 Right gravity [id number] (Active)           Findings: consistent with pre-op    Complications: none      Marco A Thompson DPM     Date: 3/5/2017  Time: 10:40 AM

## 2017-03-05 NOTE — INTERVAL H&P NOTE
H&P reviewed. The patient was examined and there are no changes to the H&P.     Plan is for TMA RLE  With gastrocrecession RLE. Risks and benefits of the procedures were discussed with the patient. No guarantees were given or implied.           This document has been electronically signed by Marco A Thompson DPM on March 5, 2017 7:54 AM

## 2017-03-05 NOTE — ANESTHESIA PROCEDURE NOTES
Airway  Urgency: elective    Airway not difficult    General Information and Staff    Patient location during procedure: OR  CRNA: MORGAN ALONZO    Indications and Patient Condition  Indications for airway management: airway protection    Preoxygenated: yes  MILS not maintained throughout  Mask difficulty assessment: 1 - vent by mask    Final Airway Details  Final airway type: supraglottic airway      Successful airway: classic  Size 5    Number of attempts at approach: 1

## 2017-03-06 PROBLEM — Z89.431 STATUS POST TRANSMETATARSAL AMPUTATION OF RIGHT FOOT: Status: ACTIVE | Noted: 2017-03-06

## 2017-03-06 LAB
ALBUMIN SERPL-MCNC: 3.7 G/DL (ref 3.4–4.8)
ALBUMIN/GLOB SERPL: 1.1 G/DL (ref 1.1–1.8)
ALP SERPL-CCNC: 55 U/L (ref 38–126)
ALT SERPL W P-5'-P-CCNC: 26 U/L (ref 21–72)
ANION GAP SERPL CALCULATED.3IONS-SCNC: 11 MMOL/L (ref 5–15)
AST SERPL-CCNC: 25 U/L (ref 17–59)
BASOPHILS # BLD AUTO: 0.02 10*3/MM3 (ref 0–0.2)
BASOPHILS NFR BLD AUTO: 0.3 % (ref 0–2)
BILIRUB SERPL-MCNC: 0.8 MG/DL (ref 0.2–1.3)
BUN BLD-MCNC: 20 MG/DL (ref 7–21)
BUN/CREAT SERPL: 20.8 (ref 7–25)
CALCIUM SPEC-SCNC: 8.5 MG/DL (ref 8.4–10.2)
CHLORIDE SERPL-SCNC: 102 MMOL/L (ref 95–110)
CO2 SERPL-SCNC: 24 MMOL/L (ref 22–31)
CREAT BLD-MCNC: 0.96 MG/DL (ref 0.7–1.3)
DEPRECATED RDW RBC AUTO: 43.3 FL (ref 35.1–43.9)
EOSINOPHIL # BLD AUTO: 0.11 10*3/MM3 (ref 0–0.7)
EOSINOPHIL NFR BLD AUTO: 1.8 % (ref 0–7)
ERYTHROCYTE [DISTWIDTH] IN BLOOD BY AUTOMATED COUNT: 13.7 % (ref 11.5–14.5)
GFR SERPL CREATININE-BSD FRML MDRD: 82 ML/MIN/1.73 (ref 56–130)
GLOBULIN UR ELPH-MCNC: 3.3 GM/DL (ref 2.3–3.5)
GLUCOSE BLD-MCNC: 98 MG/DL (ref 60–100)
GLUCOSE BLDC GLUCOMTR-MCNC: 128 MG/DL (ref 70–130)
GLUCOSE BLDC GLUCOMTR-MCNC: 133 MG/DL (ref 70–130)
GLUCOSE BLDC GLUCOMTR-MCNC: 81 MG/DL (ref 70–130)
GLUCOSE BLDC GLUCOMTR-MCNC: 84 MG/DL (ref 70–130)
GLUCOSE BLDC GLUCOMTR-MCNC: 89 MG/DL (ref 70–130)
GLUCOSE BLDC GLUCOMTR-MCNC: 99 MG/DL (ref 70–130)
HCT VFR BLD AUTO: 36.2 % (ref 39–49)
HGB BLD-MCNC: 12.2 G/DL (ref 13.7–17.3)
IMM GRANULOCYTES # BLD: 0 10*3/MM3 (ref 0–0.02)
IMM GRANULOCYTES NFR BLD: 0 % (ref 0–0.5)
LYMPHOCYTES # BLD AUTO: 1.37 10*3/MM3 (ref 0.6–4.2)
LYMPHOCYTES NFR BLD AUTO: 21.9 % (ref 10–50)
MCH RBC QN AUTO: 29 PG (ref 26.5–34)
MCHC RBC AUTO-ENTMCNC: 33.7 G/DL (ref 31.5–36.3)
MCV RBC AUTO: 86 FL (ref 80–98)
MONOCYTES # BLD AUTO: 0.88 10*3/MM3 (ref 0–0.9)
MONOCYTES NFR BLD AUTO: 14.1 % (ref 0–12)
NEUTROPHILS # BLD AUTO: 3.87 10*3/MM3 (ref 2–8.6)
NEUTROPHILS NFR BLD AUTO: 61.9 % (ref 37–80)
PLATELET # BLD AUTO: 151 10*3/MM3 (ref 150–450)
PMV BLD AUTO: 10.6 FL (ref 8–12)
POTASSIUM BLD-SCNC: 4 MMOL/L (ref 3.5–5.1)
PROT SERPL-MCNC: 7 G/DL (ref 6.3–8.6)
RBC # BLD AUTO: 4.21 10*6/MM3 (ref 4.37–5.74)
SODIUM BLD-SCNC: 137 MMOL/L (ref 137–145)
VANCOMYCIN TROUGH SERPL-MCNC: 14.16 MCG/ML (ref 10–15)
WBC NRBC COR # BLD: 6.25 10*3/MM3 (ref 3.2–9.8)

## 2017-03-06 PROCEDURE — 94799 UNLISTED PULMONARY SVC/PX: CPT

## 2017-03-06 PROCEDURE — 80202 ASSAY OF VANCOMYCIN: CPT | Performed by: FAMILY MEDICINE

## 2017-03-06 PROCEDURE — 25010000002 VANCOMYCIN PER 500 MG: Performed by: FAMILY MEDICINE

## 2017-03-06 PROCEDURE — 82962 GLUCOSE BLOOD TEST: CPT

## 2017-03-06 PROCEDURE — 99024 POSTOP FOLLOW-UP VISIT: CPT | Performed by: PODIATRIST

## 2017-03-06 PROCEDURE — 97162 PT EVAL MOD COMPLEX 30 MIN: CPT

## 2017-03-06 PROCEDURE — 80053 COMPREHEN METABOLIC PANEL: CPT | Performed by: INTERNAL MEDICINE

## 2017-03-06 PROCEDURE — 97530 THERAPEUTIC ACTIVITIES: CPT

## 2017-03-06 PROCEDURE — G8979 MOBILITY GOAL STATUS: HCPCS

## 2017-03-06 PROCEDURE — 85025 COMPLETE CBC W/AUTO DIFF WBC: CPT | Performed by: INTERNAL MEDICINE

## 2017-03-06 PROCEDURE — G8978 MOBILITY CURRENT STATUS: HCPCS

## 2017-03-06 PROCEDURE — 25010000002 ENOXAPARIN PER 10 MG: Performed by: FAMILY MEDICINE

## 2017-03-06 RX ORDER — TEMAZEPAM 15 MG/1
15 CAPSULE ORAL NIGHTLY PRN
Status: DISCONTINUED | OUTPATIENT
Start: 2017-03-06 | End: 2017-03-09 | Stop reason: HOSPADM

## 2017-03-06 RX ADMIN — DRONEDARONE 400 MG: 400 TABLET, FILM COATED ORAL at 22:25

## 2017-03-06 RX ADMIN — GABAPENTIN 300 MG: 300 CAPSULE ORAL at 06:01

## 2017-03-06 RX ADMIN — FLUTICASONE PROPIONATE 1 SPRAY: 50 SPRAY, METERED NASAL at 08:46

## 2017-03-06 RX ADMIN — MAGNESIUM OXIDE TAB 400 MG (241.3 MG ELEMENTAL MG) 400 MG: 400 (241.3 MG) TAB at 08:46

## 2017-03-06 RX ADMIN — ACETAMINOPHEN 650 MG: 325 TABLET ORAL at 16:27

## 2017-03-06 RX ADMIN — ENOXAPARIN SODIUM 40 MG: 40 INJECTION SUBCUTANEOUS at 08:46

## 2017-03-06 RX ADMIN — GABAPENTIN 300 MG: 300 CAPSULE ORAL at 22:26

## 2017-03-06 RX ADMIN — PANTOPRAZOLE SODIUM 40 MG: 40 TABLET, DELAYED RELEASE ORAL at 06:01

## 2017-03-06 RX ADMIN — ACETAMINOPHEN 650 MG: 325 TABLET ORAL at 08:52

## 2017-03-06 RX ADMIN — FLUTICASONE PROPIONATE 1 SPRAY: 50 SPRAY, METERED NASAL at 18:28

## 2017-03-06 RX ADMIN — TEMAZEPAM 15 MG: 15 CAPSULE ORAL at 22:26

## 2017-03-06 RX ADMIN — VANCOMYCIN HYDROCHLORIDE 2250 MG: 5 INJECTION, POWDER, LYOPHILIZED, FOR SOLUTION INTRAVENOUS at 03:20

## 2017-03-06 RX ADMIN — GABAPENTIN 300 MG: 300 CAPSULE ORAL at 15:15

## 2017-03-06 RX ADMIN — VANCOMYCIN HYDROCHLORIDE 2250 MG: 5 INJECTION, POWDER, LYOPHILIZED, FOR SOLUTION INTRAVENOUS at 15:15

## 2017-03-06 NOTE — PROGRESS NOTES
Acute Care - Physical Therapy Initial Evaluation  [facility]     Patient Name: Emre Lisa  : 1960  MRN: [id number]  Today's Date: 3/6/2017      Date of Referral to PT: 17  Referring Physician: Dr. Bravo      Admit Date: 3/2/2017     Visit Dx:    ICD-10-CM ICD-9-CM   1. Acute osteomyelitis of right foot M86.171 730.07   2. Impaired physical mobility Z74.09 781.99     Patient Active Problem List   Diagnosis   • Foot osteomyelitis, right   • Nodule of groin   • Type 2 diabetes mellitus with skin complication   • Status post transmetatarsal amputation of right foot     Past Medical History   Diagnosis Date   • Arthritis    • Diabetes mellitus    • Diabetic foot ulcer associated with type 2 diabetes mellitus      left great toe   • Hypertension      Past Surgical History   Procedure Laterality Date   • Cardiac defibrillator placement     • Toe amputation Right 2016   • Carpal tunnel release       elbow and wrist left arm   • Tonsilectomy, adenoidectomy, bilateral myringotomy and tubes       age 25   • Foot wound closure Right 3/2/2017     Procedure: INCISION AND DRAINAGE, RIGHT FOOT;  Surgeon: Tung Rosenthal DPM;  Location: [facility];  Service:           PT ASSESSMENT (last 72 hours)      PT Evaluation       17 0844 17    Rehab Evaluation    Document Type evaluation  -MP     Subjective Information agree to therapy;complains of;pain  -MP     Patient Effort, Rehab Treatment adequate  -MP     Symptoms Noted During/After Treatment none  -MP     General Information    Patient Profile Review yes  -MP     Referring Physician Dr. Bravo  -MP     General Observations In supine on RA  -MP     Precautions/Limitations fall precautions  -MP     Prior Level of Function independent:;all household mobility;community mobility  -MP     Equipment Currently Used at Home none  -MP --  -AC    Plans/Goals Discussed With patient  -MP     Risks Reviewed patient:  -MP     Benefits Reviewed  patient:  -MP     Living Environment    Lives With spouse  -MP --  -AC    Living Arrangements house  -MP --  -AC    Home Accessibility stairs to enter home;stairs within home   no HR outside of home or in home.  -MP     Number of Stairs to Enter Home 4  -MP     Number of Stairs Within Home 2  -MP     Stair Railings at Home none  -MP     Type of Financial/Environmental Concern none  -MP     Transportation Available family or friend will provide  -MP --  -AC    Clinical Impression    Date of Referral to PT 03/06/17  -MP     PT Diagnosis impaired physical mobility  -MP     Prognosis good  -MP     Functional Level At Time Of Evaluation mod ind for transfers, bed mobility  -MP     Criteria for Skilled Therapeutic Interventions Met yes;treatment indicated  -MP     Pathology/Pathophysiology Noted (Describe Specifically for Each System) musculoskeletal;neuromuscular  -MP     Impairments Found (describe specific impairments) gait, locomotion, and balance;joint integrity and mobility  -MP     Rehab Potential good, to achieve stated therapy goals  -MP     Vital Signs    Pre Systolic BP Rehab 92  -MP     Pre Treatment Diastolic BP 51  -MP     Pretreatment Heart Rate (beats/min) 81  -MP     Posttreatment Heart Rate (beats/min) 80  -MP     Pre SpO2 (%) 96  -MP     O2 Delivery Pre Treatment room air  -MP     Post SpO2 (%) 97  -MP     O2 Delivery Post Treatment room air  -MP     Pre Patient Position Supine  -MP     Intra Patient Position Standing  -MP     Post Patient Position Supine  -MP     Pain Assessment    Pain Assessment 0-10  -MP     Pain Score 4  -MP     Post Pain Score 4  -MP     Pain Location Back  -MP     Response to Interventions pt tolerated treatment well  -MP     Vision Assessment/Intervention    Visual Impairment WFL  -MP     Cognitive Assessment/Intervention    Current Cognitive/Communication Assessment functional  -MP     Orientation Status oriented x 4  -MP     Follows Commands/Answers Questions 100% of the time   -MP     Personal Safety WNL/WFL  -MP     Personal Safety Interventions fall prevention program maintained;gait belt;muscle strengthening facilitated;nonskid shoes/slippers when out of bed  -MP     Short/Long Term Memory intact short term memory;intact long term memory  -MP     ROM (Range of Motion)    General ROM lower extremity range of motion deficits identified  -MP     General ROM Detail decreased L foot/ankle ROM  -MP     MMT (Manual Muscle Testing)    General MMT Assessment no strength deficits identified  -MP     Mobility Assessment/Training    Extremity Weight-Bearing Status right lower extremity  -MP     Right Lower Extremity Weight-Bearing non weight-bearing  -MP     Bed Mobility, Assessment/Treatment    Bed Mobility, Assistive Device bed rails;head of bed elevated  -MP     Bed Mobility, Roll Left, [place] conditional independence  -MP     Bed Mobility, Roll Right, [place] conditional independence  -MP     Bed Mob, Supine to Sit, [place] conditional independence  -MP     Bed Mob, Sit to Supine, [place] conditional independence  -MP     Transfer Assessment/Treatment    Transfers, Sit-Stand [place] conditional independence  -MP     Transfers, Stand-Sit [place] conditional independence  -MP     Transfers, Sit-Stand-Sit, Assist Device rolling walker  -MP     Gait Assessment/Treatment    Gait, [place] Level contact guard assist  -MP     Gait, Assistive Device rolling walker  -MP     Gait, Distance (Feet) 2  -MP     Gait, Gait Pattern Analysis --   hopping  -MP     Gait, Maintain Weight Bearing Status able to maintain weight bearing status  -MP     Sensory Assessment/Intervention    Sensory Impairment numbness   R LE  -MP     Positioning and Restraints    Pre-Treatment Position in bed  -MP     Post Treatment Position bed  -MP     In Bed supine  -MP       User Key  (r) = Recorded By, (t) = Taken By, (c) = Cosigned By    Initials Name Provider Type    ANASTASIYA Camacho RN  Registered Nurse    PERNELL Liu PT Physical Therapist          Physical Therapy Education     Title: PT OT SLP Therapies (Active)     Topic: Physical Therapy (Active)     Point: Mobility training (Done)    Learning Progress Summary    Learner Readiness Method Response Comment Documented by Status   Patient Acceptance E [facility] 03/06/17 1132 Done               Point: Body mechanics (Done)    Learning Progress Summary    Learner Readiness Method Response Comment Documented by Status   Patient Acceptance E [facility] 03/06/17 1132 Done               Point: Precautions (Done)    Learning Progress Summary    Learner Readiness Method Response Comment Documented by Status   Patient Acceptance E [facility] 03/06/17 1132 Done                      User Key     Initials Effective Dates Name Provider Type Discipline     10/17/16 -  Tung Liu PT Physical Therapist PT                PT Recommendation and Plan  Anticipated Equipment Needs At Discharge: front wheeled walker, knee sling, rolling knee walker  Anticipated Discharge Disposition: inpatient rehabilitation facility  Planned Therapy Interventions: balance training, gait training, stair training, transfer training  PT Frequency: 5 times/wk  Plan of Care Review  Plan Of Care Reviewed With: patient  Outcome Summary/Follow up Plan: Pt presents with decreased functional mobility from baseline. Pt has stairs outside and within home with no railings. Pt would benefit from ARU stay prior to d/c home due to home safety concerns.           IP PT Goals       03/06/17 1129          Transfer Training PT LTG    Transfer Training PT LTG, Date Established 03/06/17  -MP      Transfer Training PT LTG, Time to Achieve by discharge  -MP      Transfer Training PT LTG, Activity Type all transfers  -MP      Transfer Training PT LTG, [place] Level conditional independence  -MP      Transfer Training PT LTG, Assist Device walker, rolling  -MP      Gait Training PT LTG    Gait  Training Goal PT LTG, Date Established 03/06/17  -MP      Gait Training Goal PT LTG, Time to Achieve by discharge  -MP      Gait Training Goal PT LTG, [place] conditional independence  -MP      Gait Training Goal PT LTG, Assist Device walker, rolling   knee sling  -MP      Gait Training Goal PT LTG, Distance to Achieve 50  -MP      Stair Training PT LTG    Stair Training Goal PT LTG, Date Established 03/06/17  -MP      Stair Training Goal PT LTG, Time to Achieve by discharge  -MP      Stair Training Goal PT LTG, Number of Steps 4  -MP      Stair Training Goal PT LTG, [place] conditional independence  -MP      Stair Training Goal PT LTG, Assist Device walker, rolling  -MP        User Key  (r) = Recorded By, (t) = Taken By, (c) = Cosigned By    Initials Name Provider Type    PERNELL Liu PT Physical Therapist                Outcome Measures       03/06/17 1100          How much help from another person do you currently need...    Turning from your back to your side while in flat bed without using bedrails? 4  -MP      Moving from lying on back to sitting on the side of a flat bed without bedrails? 4  -MP      Moving to and from a bed to a chair (including a wheelchair)? 3  -MP      Standing up from a chair using your arms (e.g., wheelchair, bedside chair)? 4  -MP      Climbing 3-5 steps with a railing? 2  -MP      To walk in hospital room? 3  -MP      AM-PAC 6 Clicks Score 20  -MP      Functional Assessment    Outcome Measure Options AM-PAC 6 Clicks Basic Mobility (PT)  -MP        User Key  (r) = Recorded By, (t) = Taken By, (c) = Cosigned By    Initials Name Provider Type    PERNELL Liu PT Physical Therapist           Time Calculation:         PT Charges       03/06/17 1134          Time Calculation    Start Time 0844  -MP      Stop Time 0914  -MP      Time Calculation (min) 30 min  -MP      PT Received On 03/06/17  -MP      PT Goal Re-Cert Due Date 03/19/17  -MP      Time  Calculation- PT    Total Timed Code Minutes- PT 15 minute(s)  -MP        User Key  (r) = Recorded By, (t) = Taken By, (c) = Cosigned By    Initials Name Provider Type    MP Tung Liu PT Physical Therapist          Therapy Charges for Today     Code Description Service Date Service Provider Modifiers Qty    80852502887 HC PT MOBILITY CURRENT 3/6/2017 Tung Liu, PT GP, CJ 1    55957380699 HC PT MOBILITY PROJECTED 3/6/2017 Tung Liu PT GP, CI 1    02480043338 HC PT EVAL MOD COMPLEXITY 1 3/6/2017 Tung Liu, PT GP 1    03796717767 HC PT THERAPEUTIC ACT EA 15 MIN 3/6/2017 Tung Liu, PT GP 1          PT G-Codes  PT Professional Judgement Used?: Yes  Outcome Measure Options: AM-PAC 6 Clicks Basic Mobility (PT)  Score: 20  Functional Limitation: Mobility: Walking and moving around  Mobility: Walking and Moving Around Current Status (): At least 20 percent but less than 40 percent impaired, limited or restricted  Mobility: Walking and Moving Around Goal Status (): At least 1 percent but less than 20 percent impaired, limited or restricted      Tung Liu PT  3/6/2017

## 2017-03-06 NOTE — PLAN OF CARE
Problem: Patient Care Overview (Adult)  Goal: Plan of Care Review  Outcome: Ongoing (interventions implemented as appropriate)    03/06/17 9558   Coping/Psychosocial Response Interventions   Plan Of Care Reviewed With patient   Patient Care Overview   Progress improving   Outcome Evaluation   Outcome Summary/Follow up Plan pt doing better; complains of pain only in neck and back due to bed being uncomfortable       Goal: Adult Individualization and Mutuality  Outcome: Ongoing (interventions implemented as appropriate)  Goal: Discharge Needs Assessment  Outcome: Ongoing (interventions implemented as appropriate)    Problem: Cellulitis (Adult)  Goal: Signs and Symptoms of Listed Potential Problems Will be Absent or Manageable (Cellulitis)  Outcome: Ongoing (interventions implemented as appropriate)    Problem: Diabetes, Type 2 (Adult)  Goal: Signs and Symptoms of Listed Potential Problems Will be Absent or Manageable (Diabetes, Type 2)  Outcome: Ongoing (interventions implemented as appropriate)

## 2017-03-06 NOTE — PLAN OF CARE
Problem: Patient Care Overview (Adult)  Goal: Plan of Care Review  Outcome: Ongoing (interventions implemented as appropriate)    03/06/17 1129   Coping/Psychosocial Response Interventions   Plan Of Care Reviewed With patient   Outcome Evaluation   Outcome Summary/Follow up Plan Pt presents with decreased functional mobility from baseline. Pt has stairs outside and within home with no railings. Pt would benefit from ARU stay prior to d/c home due to home safety concerns.          Problem: Inpatient Physical Therapy  Goal: Transfer Training Goal 1 LTG- PT  Outcome: Ongoing (interventions implemented as appropriate)    03/06/17 1129   Transfer Training PT LTG   Transfer Training PT LTG, Date Established 03/06/17   Transfer Training PT LTG, Time to Achieve by discharge   Transfer Training PT LTG, Activity Type all transfers   Transfer Training PT LTG, [place] Level conditional independence   Transfer Training PT LTG, Assist Device walker, rolling       Goal: Gait Training Goal LTG- PT  Outcome: Ongoing (interventions implemented as appropriate)    03/06/17 1129   Gait Training PT LTG   Gait Training Goal PT LTG, Date Established 03/06/17   Gait Training Goal PT LTG, Time to Achieve by discharge   Gait Training Goal PT LTG, [place] Level conditional independence   Gait Training Goal PT LTG, Assist Device walker, rolling  (knee sling)   Gait Training Goal PT LTG, Distance to Achieve 50       Goal: Stair Training Goal LTG- PT  Outcome: Ongoing (interventions implemented as appropriate)    03/06/17 1129   Stair Training PT LTG   Stair Training Goal PT LTG, Date Established 03/06/17   Stair Training Goal PT LTG, Time to Achieve by discharge   Stair Training Goal PT LTG, Number of Steps 4   Stair Training Goal PT LTG, [place] Level conditional independence   Stair Training Goal PT LTG, Assist Device walker, rolling

## 2017-03-06 NOTE — PROGRESS NOTES
"Pharmacokinetics by Pharmacy - Vancomycin    Emre Lisa is a 56 y.o. male  [Ht: 76\" (193 cm); Wt: 274 lb (124 kg)]    Estimated Creatinine Clearance: 126.9 mL/min (by C-G formula based on Cr of 0.96).   Lab Results   Component Value Date    CREATININE 0.96 03/06/2017    CREATININE 0.91 03/05/2017    CREATININE 0.81 03/03/2017    CREATININE 1.0 10/25/2016    CREATININE 0.9 07/13/2016    CREATININE 1.2 03/29/2016      Lab Results   Component Value Date    WBC 6.25 03/06/2017    WBC 5.24 03/05/2017    WBC 5.92 03/03/2017      Temp Readings from Last 1 Encounters:   03/06/17 97.5 °F (36.4 °C) (Oral)      Lab Results   Component Value Date    VANCOTROUGH 14.16 03/06/2017         Culture Results:  Microbiology Results (last 10 days)       Procedure Component Value - Date/Time      Wound Culture [[id number]]  (Abnormal) Collected:  03/02/17 1946    Lab Status:  Final result Specimen:  Wound from Foot, Right Updated:  03/04/17 0822     Wound Culture        Scant growth (1+) Streptococcus, Beta Hemolytic, Group B (A)      Susceptibility testing not required; These organisms are predictively sensitive to penicillin and Clindamycin therapy        Gram Stain Result Rare (1+) WBCs seen       No organisms seen     Blood Culture [[id number]]  (Normal) Collected:  03/02/17 1305    Lab Status:  Preliminary result Specimen:  Blood from Arm, Right Updated:  03/05/17 1401     Blood Culture No growth at 3 days     Blood Culture [[id number]]  (Normal) Collected:  03/02/17 1302    Lab Status:  Preliminary result Specimen:  Blood from Arm, Left Updated:  03/05/17 1401     Blood Culture No growth at 3 days                Indication for use: osteomylitis    Current Vancomycin Dose:  2250 mg IVPB every 12 hours, day 5 of therapy.      Assessment/Plan:  Vancomycin trough level is 14.16. Goal is 15-20. Will continue current dose, as this dose was increase yesterday. Will expect trough level to increase to goal range. Pharmacy will continue " to monitor renal function and adjust dose accordingly.    Sowmya Verduzco, [facility]   03/06/17 8:15 AM

## 2017-03-06 NOTE — PROGRESS NOTES
"   LOS: 4 days   Patient Care Team:  Jamaal Bravo MD as PCP - General    Chief Complaint: \"Didn't sleep well/Right foot muscle pain.\"    Subjective     History of Present Illness    Subjective:  Symptoms:  Stable.  No shortness of breath, malaise, cough, chest pain, weakness, headache, chest pressure, anorexia, diarrhea or anxiety.    Diet:  Adequate intake.  No nausea or vomiting.    Activity level: Activity impairment: Impaired/recent Right foot surgery.    Pain:  He complains of pain that is mild.  He reports pain is improving.  Pain is well controlled.        Patient states doing \"OK\" after Right TransMetatarsal Amputation of foot by Dr. Thompson ([facility]).  He is not sleeping well, some fever/chills reported and states \"room temp is not well controlled.\"  Patient's BS have been good, and he has NOT required any Levemir insulin, but he states they are NOT bringing him an ADA diet every meal (sometimes Regular).  He states his Right foot pain is not bad, but feels muscle ache after surgery.  No PT at this point has been by, he states.  Otherwise doing well.   Patient denies HA, CP, palpitations, SOA/wheezing, n/v, diarrhea/constipation. A 10 point ROS was completed.  Other than the above pertinent positives, all systems negative.  History taken from: patient & chart.    Objective     Vital Signs  Temp:  [96.8 °F (36 °C)-100 °F (37.8 °C)] 97.5 °F (36.4 °C)  Heart Rate:  [62-84] 77  Resp:  [12-18] 18  BP: ()/(53-66) 99/64    Objective:  General Appearance:  Comfortable and in no acute distress.    Vital signs: (most recent): Blood pressure 99/64, pulse 77, temperature 97.5 °F (36.4 °C), temperature source Oral, resp. rate 18, height 76\" (193 cm), weight 274 lb (124 kg), SpO2 94 %.  Vital signs are normal.  No fever.    Output: Producing urine and no stool output.    HEENT: Normal HEENT exam.    Lungs:  Normal respiratory rate and normal effort.  He is not in respiratory distress.  Breath sounds clear to " auscultation.  No stridor.  No wheezes or rhonchi.    Heart: Normal rate.  Regular rhythm.  S1 normal and S2 normal.    Chest: No chest wall tenderness.  Symmetric chest wall expansion.   Abdomen: Abdomen is soft and non-distended.  There are no signs of ascites.  Bowel sounds are normal.   There is no epigastric area or no suprapubic area tenderness.  There is no rebound tenderness.  There is no guarding.   There is no mass. There is no splenomegaly. There is no hepatomegaly.   Extremities: Normal range of motion.  There is no local swelling or dependent edema.  (Right foot wrapped/elevated after surgery; C/D/I)  Pulses: Distal pulses are intact.    Neurological: Patient is alert and oriented to person, place and time.    Pupils:  Pupils are equal, round, and reactive to light.    Skin:  Warm and dry.  No rash.             Results Review:     I reviewed the patient's new clinical results.     Lab Results (last 24 hours)     Procedure Component Value Units Date/Time    Comprehensive Metabolic Panel [[id number]]  (Abnormal) Collected:  03/05/17 0619    Specimen:  Blood Updated:  03/05/17 0628     Glucose 92 mg/dL      BUN 20 mg/dL      Creatinine 0.91 mg/dL      Sodium 141 mmol/L      Potassium 4.5 mmol/L      Chloride 102 mmol/L      CO2 29.0 mmol/L      Calcium 9.2 mg/dL      Total Protein 7.7 g/dL      Albumin 4.00 g/dL      ALT (SGPT) 24 U/L      AST (SGOT) 23 U/L      Alkaline Phosphatase 53 U/L      Total Bilirubin 0.7 mg/dL      eGFR Non African Amer 87 mL/min/1.73      Globulin 3.7 (H) gm/dL      A/G Ratio 1.1 g/dL      BUN/Creatinine Ratio 22.0      Anion Gap 10.0 mmol/L     POC Glucose Fingerstick [[id number]]  (Normal) Collected:  03/05/17 1049    Specimen:  Blood Updated:  03/05/17 1100     Glucose 101 mg/dL       Meter: [id number]: [id number] DONNA ASHLEY       Blood Culture [[id number]]  (Normal) Collected:  03/02/17 1302    Specimen:  Blood from Arm, Left Updated:  03/05/17 1401     Blood Culture  No growth at 3 days     Blood Culture [[id number]]  (Normal) Collected:  03/02/17 1305    Specimen:  Blood from Arm, Right Updated:  03/05/17 1401     Blood Culture No growth at 3 days     CBC Auto Differential [[id number]]  (Abnormal) Collected:  03/05/17 1400    Specimen:  Blood Updated:  03/05/17 1414     WBC 5.24 10*3/mm3      RBC 4.75 10*6/mm3      Hemoglobin 13.6 (L) g/dL      Hematocrit 41.5 %      MCV 87.4 fL      MCH 28.6 pg      MCHC 32.8 g/dL      RDW 14.4 %      RDW-SD 46.6 (H) fl      MPV 11.2 fL      Platelets 167 10*3/mm3      Neutrophil % 53.1 %      Lymphocyte % 32.8 %      Monocyte % 9.7 %      Eosinophil % 3.2 %      Basophil % 1.0 %      Immature Grans % 0.2 %      Neutrophils, Absolute 2.78 10*3/mm3      Lymphocytes, Absolute 1.72 10*3/mm3      Monocytes, Absolute 0.51 10*3/mm3      Eosinophils, Absolute 0.17 10*3/mm3      Basophils, Absolute 0.05 10*3/mm3      Immature Grans, Absolute 0.01 10*3/mm3      nRBC 0.0 /100 WBC     CBC & Differential [[id number]] Collected:  03/05/17 1400    Specimen:  Blood Updated:  03/05/17 1418    Narrative:       The following orders were created for panel order CBC & Differential.  Procedure                               Abnormality         Status                     ---------                               -----------         ------                     Scan Slide[[id number]]                                        Final result               CBC Auto Differential[[id number]]         Abnormal            Final result                 Please view results for these tests on the individual orders.    Scan Slide [[id number]] Collected:  03/05/17 1400    Specimen:  Blood Updated:  03/05/17 1418     RBC Morphology Normal      WBC Morphology Normal      Platelet Morphology Normal      Clumped Platelets --       None seen       CBC & Differential [[id number]] Collected:  03/06/17 0125    Specimen:  Blood Updated:  03/06/17 0247    Narrative:       The following orders were created for  panel order CBC & Differential.  Procedure                               Abnormality         Status                     ---------                               -----------         ------                     CBC Auto Differential[[id number]]         Abnormal            Final result                 Please view results for these tests on the individual orders.    CBC Auto Differential [[id number]]  (Abnormal) Collected:  03/06/17 0125    Specimen:  Blood Updated:  03/06/17 0247     WBC 6.25 10*3/mm3      RBC 4.21 (L) 10*6/mm3      Hemoglobin 12.2 (L) g/dL      Hematocrit 36.2 (L) %      MCV 86.0 fL      MCH 29.0 pg      MCHC 33.7 g/dL      RDW 13.7 %      RDW-SD 43.3 fl      MPV 10.6 fL      Platelets 151 10*3/mm3      Neutrophil % 61.9 %      Lymphocyte % 21.9 %      Monocyte % 14.1 (H) %      Eosinophil % 1.8 %      Basophil % 0.3 %      Immature Grans % 0.0 %      Neutrophils, Absolute 3.87 10*3/mm3      Lymphocytes, Absolute 1.37 10*3/mm3      Monocytes, Absolute 0.88 10*3/mm3      Eosinophils, Absolute 0.11 10*3/mm3      Basophils, Absolute 0.02 10*3/mm3      Immature Grans, Absolute 0.00 10*3/mm3     Comprehensive Metabolic Panel [[id number]]  (Normal) Collected:  03/06/17 0125    Specimen:  Blood Updated:  03/06/17 0301     Glucose 98 mg/dL      BUN 20 mg/dL      Creatinine 0.96 mg/dL      Sodium 137 mmol/L      Potassium 4.0 mmol/L      Chloride 102 mmol/L      CO2 24.0 mmol/L      Calcium 8.5 mg/dL      Total Protein 7.0 g/dL      Albumin 3.70 g/dL      ALT (SGPT) 26 U/L      AST (SGOT) 25 U/L      Alkaline Phosphatase 55 U/L      Total Bilirubin 0.8 mg/dL      eGFR Non African Amer 82 mL/min/1.73      Globulin 3.3 gm/dL      A/G Ratio 1.1 g/dL      BUN/Creatinine Ratio 20.8      Anion Gap 11.0 mmol/L     Vancomycin, Trough [[id number]]  (Normal) Collected:  03/06/17 0125    Specimen:  Blood Updated:  03/06/17 0312     Vancomycin Trough 14.16 mcg/mL         Imaging Results (last 24 hours)     Procedure Component Value  Units Date/Time    FL C Arm During Surgery [[id number]]      Updated:  03/05/17 0923    XR Foot 3+ View Right [[id number]] Collected:  03/05/17 1126     Updated:  03/05/17 1129    Narrative:       Patient Name:  AMEE FONG  Patient ID:  [id number]   Ordering:  JACQUELINE THOMPSON  Attending:  JAMAAL DILLARD  Referring:  JACQUELINE THOMPSON  ------------------------------------------------    Procedure:  Right foot        Indication:  Post transmetatarsal amputation. Osteomyelitis..    Technique:  Three views   .    Prior relevant exam: March 2, 2017.    Since prior examination there has been a transmetatarsal  amputation amputation of the mid shafts of all the metatarsals.  Soft tissue drain is noted running from medial to lateral in the  stump.    Further bony detail obscured by overlying splint, cast.      Impression:       CONCLUSION: As above.    Electronically signed by:  Gatito Cr MD  3/5/2017 11:28 AM CST  Workstation: [id number]            Medication Review:     Current Facility-Administered Medications:   •  acetaminophen (TYLENOL) tablet 650 mg, 650 mg, Oral, Q4H PRN, Jamaal Dillard MD, 650 mg at 03/05/17 0000  •  bacitracin injection, , , PRN, Jacqueline Thompson DPM, 50,000 Units at 03/05/17 0839  •  bupivacaine (PF) (MARCAINE) 0.5 % injection, , , PRN, Tung Rosenthal DPM, 30 mL at 03/05/17 0839  •  dronedarone (MULTAQ) tablet 400 mg, 400 mg, Oral, Nightly, Jamaal Dillard MD, 400 mg at 03/05/17 2058  •  enoxaparin (LOVENOX) syringe 40 mg, 40 mg, Subcutaneous, Daily, Jamaal Dillard MD, 40 mg at 03/04/17 0802  •  fluticasone (FLONASE) 50 MCG/ACT nasal spray 1 spray, 1 spray, Each Nare, BID, Jamaal Dillard MD, 1 spray at 03/04/17 0802  •  gabapentin (NEURONTIN) capsule 300 mg, 300 mg, Oral, Q8H, Jamaal Dillard MD, 300 mg at 03/05/17 2310  •  HYDROcodone-acetaminophen (NORCO) 7.5-325 MG per tablet 1 tablet, 1 tablet, Oral, Q6H PRN, Tung Rosenthal DPM, 1 tablet at 03/05/17 2058  •   insulin aspart (novoLOG) injection 0-9 Units, 0-9 Units, Subcutaneous, 4x Daily AC & at Bedtime, Jamaal Bravo MD, 0 Units at 03/02/17 1834  •  insulin detemir (LEVEMIR) injection 10 Units, 10 Units, Subcutaneous, Nightly, Jamaal Bravo MD, 10 Units at 03/03/17 2115  •  magnesium oxide (MAGOX) tablet 400 mg, 400 mg, Oral, Daily, Jamaal Bravo MD, 400 mg at 03/04/17 0802  •  metoprolol tartrate (LOPRESSOR) half tablet 12.5 mg, 12.5 mg, Oral, BID, Jamaal Bravo MD, 12.5 mg at 03/04/17 1725  •  Morphine sulfate (PF) injection 2 mg, 2 mg, Intravenous, Q2H PRN, Jamaal Bravo MD  •  pantoprazole (PROTONIX) EC tablet 40 mg, 40 mg, Oral, Q AM, Jamaal Bravo MD, 40 mg at 03/04/17 0531  •  Pharmacy to dose vancomycin, , Does not apply, Continuous PRN, Jamaal Bravo MD  •  sodium chloride (NS) irrigation solution, , , PRN, Marco A Thompson DPM, 2,000 mL at 03/05/17 0841  •  sodium chloride 0.9 % flush 1-10 mL, 1-10 mL, Intravenous, PRN, Jamaal Bravo MD, 10 mL at 03/02/17 1835  •  valsartan (DIOVAN) tablet 40 mg, 40 mg, Oral, Nightly, Jamaal Bravo MD, 40 mg at 03/05/17 2056  •  vancomycin (VANCOCIN) 2,250 mg in sodium chloride 0.9 % 500 mL IVPB, 2,250 mg, Intravenous, Q12H, Jamaal Bravo MD, 2,250 mg at 03/06/17 0320      Assessment/Plan     Active Problems:    Foot osteomyelitis, right    Nodule of groin    Type 2 diabetes mellitus with skin complication      Assessment & Plan   1. POD #1 Right foot TMA (I&D was on 3/2):  Doing well/healing up; pain controlled.  PT ordered for today.  Will follow with Dr. Thompson to see when he feels safe/ready to d/c home.  Will write for Restoril for sleep.  Consider CM for d/c planning in next 2-3 days if needed.    2. T2 DM:  Controlled with SSI/ADA diet for now -- make sure ADA 2000 Lei food at EACH meal.  Resume home/oral DM meds at d/c.     3. Right groin LNs:  Appear to be reactive/inflammatory, but will finish w/u as outpatient.  NO weight loss,  night sweats, bone pain, etc.  Malignancy low on differential dx list, but will readdress as outpatient.     Jamaal Bravo MD  03/06/17  5:03 AM    Time: 15 min

## 2017-03-06 NOTE — PLAN OF CARE
Problem: Patient Care Overview (Adult)  Goal: Plan of Care Review  Outcome: Ongoing (interventions implemented as appropriate)    03/06/17 0356   Coping/Psychosocial Response Interventions   Plan Of Care Reviewed With patient   Patient Care Overview   Progress improving   Outcome Evaluation   Outcome Summary/Follow up Plan blood sugars controlled, mild pain, no other changes       Goal: Adult Individualization and Mutuality  Outcome: Ongoing (interventions implemented as appropriate)  Goal: Discharge Needs Assessment  Outcome: Ongoing (interventions implemented as appropriate)    Problem: Cellulitis (Adult)  Goal: Signs and Symptoms of Listed Potential Problems Will be Absent or Manageable (Cellulitis)  Outcome: Ongoing (interventions implemented as appropriate)    Problem: Diabetes, Type 2 (Adult)  Goal: Signs and Symptoms of Listed Potential Problems Will be Absent or Manageable (Diabetes, Type 2)  Outcome: Ongoing (interventions implemented as appropriate)

## 2017-03-06 NOTE — SIGNIFICANT NOTE
"   03/06/17 1045   Rehab Treatment   Discipline physical therapy assistant   Treatment Not Performed patient/family declined treatment  (Pt stated, \"I am tapped out.\" Pt requested PTA to check back in am. )   Recommendation   PT - Next Appointment 03/07/17     "

## 2017-03-07 LAB
ALBUMIN SERPL-MCNC: 3.8 G/DL (ref 3.4–4.8)
ALBUMIN/GLOB SERPL: 1.1 G/DL (ref 1.1–1.8)
ALP SERPL-CCNC: 55 U/L (ref 38–126)
ALT SERPL W P-5'-P-CCNC: 36 U/L (ref 21–72)
ANION GAP SERPL CALCULATED.3IONS-SCNC: 8 MMOL/L (ref 5–15)
AST SERPL-CCNC: 30 U/L (ref 17–59)
BACTERIA SPEC AEROBE CULT: NORMAL
BACTERIA SPEC AEROBE CULT: NORMAL
BASOPHILS # BLD AUTO: 0.03 10*3/MM3 (ref 0–0.2)
BASOPHILS NFR BLD AUTO: 0.4 % (ref 0–2)
BILIRUB SERPL-MCNC: 0.9 MG/DL (ref 0.2–1.3)
BUN BLD-MCNC: 20 MG/DL (ref 7–21)
BUN/CREAT SERPL: 22 (ref 7–25)
CALCIUM SPEC-SCNC: 8.9 MG/DL (ref 8.4–10.2)
CHLORIDE SERPL-SCNC: 100 MMOL/L (ref 95–110)
CO2 SERPL-SCNC: 29 MMOL/L (ref 22–31)
CREAT BLD-MCNC: 0.91 MG/DL (ref 0.7–1.3)
DEPRECATED RDW RBC AUTO: 42.5 FL (ref 35.1–43.9)
EOSINOPHIL # BLD AUTO: 0.17 10*3/MM3 (ref 0–0.7)
EOSINOPHIL NFR BLD AUTO: 2.2 % (ref 0–7)
ERYTHROCYTE [DISTWIDTH] IN BLOOD BY AUTOMATED COUNT: 13.6 % (ref 11.5–14.5)
GFR SERPL CREATININE-BSD FRML MDRD: 87 ML/MIN/1.73 (ref 60–130)
GLOBULIN UR ELPH-MCNC: 3.6 GM/DL (ref 2.3–3.5)
GLUCOSE BLD-MCNC: 110 MG/DL (ref 60–100)
GLUCOSE BLDC GLUCOMTR-MCNC: 103 MG/DL (ref 70–130)
GLUCOSE BLDC GLUCOMTR-MCNC: 107 MG/DL (ref 70–130)
GLUCOSE BLDC GLUCOMTR-MCNC: 111 MG/DL (ref 70–130)
GLUCOSE BLDC GLUCOMTR-MCNC: 125 MG/DL (ref 70–130)
GLUCOSE BLDC GLUCOMTR-MCNC: 138 MG/DL (ref 70–130)
GLUCOSE BLDC GLUCOMTR-MCNC: 77 MG/DL (ref 70–130)
HCT VFR BLD AUTO: 36.2 % (ref 39–49)
HGB BLD-MCNC: 12.3 G/DL (ref 13.7–17.3)
IMM GRANULOCYTES # BLD: 0.02 10*3/MM3 (ref 0–0.02)
IMM GRANULOCYTES NFR BLD: 0.3 % (ref 0–0.5)
LAB AP CASE REPORT: NORMAL
LAB AP INTRADEPARTMENTAL CONSULT: NORMAL
LYMPHOCYTES # BLD AUTO: 1.53 10*3/MM3 (ref 0.6–4.2)
LYMPHOCYTES NFR BLD AUTO: 19.4 % (ref 10–50)
Lab: NORMAL
MCH RBC QN AUTO: 28.9 PG (ref 26.5–34)
MCHC RBC AUTO-ENTMCNC: 34 G/DL (ref 31.5–36.3)
MCV RBC AUTO: 85.2 FL (ref 80–98)
MONOCYTES # BLD AUTO: 1.27 10*3/MM3 (ref 0–0.9)
MONOCYTES NFR BLD AUTO: 16.1 % (ref 0–12)
NEUTROPHILS # BLD AUTO: 4.88 10*3/MM3 (ref 2–8.6)
NEUTROPHILS NFR BLD AUTO: 61.6 % (ref 37–80)
PATH REPORT.FINAL DX SPEC: NORMAL
PATH REPORT.GROSS SPEC: NORMAL
PLATELET # BLD AUTO: 159 10*3/MM3 (ref 150–450)
PMV BLD AUTO: 10.2 FL (ref 8–12)
POTASSIUM BLD-SCNC: 4.3 MMOL/L (ref 3.5–5.1)
PROT SERPL-MCNC: 7.4 G/DL (ref 6.3–8.6)
RBC # BLD AUTO: 4.25 10*6/MM3 (ref 4.37–5.74)
SODIUM BLD-SCNC: 137 MMOL/L (ref 137–145)
WBC NRBC COR # BLD: 7.9 10*3/MM3 (ref 3.2–9.8)

## 2017-03-07 PROCEDURE — 82962 GLUCOSE BLOOD TEST: CPT

## 2017-03-07 PROCEDURE — 25010000002 ENOXAPARIN PER 10 MG: Performed by: FAMILY MEDICINE

## 2017-03-07 PROCEDURE — 99024 POSTOP FOLLOW-UP VISIT: CPT | Performed by: PODIATRIST

## 2017-03-07 PROCEDURE — 85025 COMPLETE CBC W/AUTO DIFF WBC: CPT | Performed by: INTERNAL MEDICINE

## 2017-03-07 PROCEDURE — 25010000002 VANCOMYCIN PER 500 MG: Performed by: FAMILY MEDICINE

## 2017-03-07 PROCEDURE — 80053 COMPREHEN METABOLIC PANEL: CPT | Performed by: INTERNAL MEDICINE

## 2017-03-07 RX ADMIN — DRONEDARONE 400 MG: 400 TABLET, FILM COATED ORAL at 20:46

## 2017-03-07 RX ADMIN — FLUTICASONE PROPIONATE 1 SPRAY: 50 SPRAY, METERED NASAL at 08:54

## 2017-03-07 RX ADMIN — GABAPENTIN 300 MG: 300 CAPSULE ORAL at 06:12

## 2017-03-07 RX ADMIN — GABAPENTIN 300 MG: 300 CAPSULE ORAL at 14:52

## 2017-03-07 RX ADMIN — ENOXAPARIN SODIUM 40 MG: 40 INJECTION SUBCUTANEOUS at 08:53

## 2017-03-07 RX ADMIN — ACETAMINOPHEN 650 MG: 325 TABLET ORAL at 06:12

## 2017-03-07 RX ADMIN — ACETAMINOPHEN 650 MG: 325 TABLET ORAL at 17:50

## 2017-03-07 RX ADMIN — VANCOMYCIN HYDROCHLORIDE 2250 MG: 5 INJECTION, POWDER, LYOPHILIZED, FOR SOLUTION INTRAVENOUS at 14:52

## 2017-03-07 RX ADMIN — MAGNESIUM OXIDE TAB 400 MG (241.3 MG ELEMENTAL MG) 400 MG: 400 (241.3 MG) TAB at 08:54

## 2017-03-07 RX ADMIN — VANCOMYCIN HYDROCHLORIDE 2250 MG: 5 INJECTION, POWDER, LYOPHILIZED, FOR SOLUTION INTRAVENOUS at 03:11

## 2017-03-07 RX ADMIN — GABAPENTIN 300 MG: 300 CAPSULE ORAL at 22:06

## 2017-03-07 RX ADMIN — PANTOPRAZOLE SODIUM 40 MG: 40 TABLET, DELAYED RELEASE ORAL at 06:12

## 2017-03-07 NOTE — OP NOTE
DATE OF PROCEDURE:  03/05/2017    SURGEON:  Marco A Thompson DPM    ASSISTANT:  Megan Pate    PREOPERATIVE DIAGNOSES:  1.  Equinus, right lower extremity.  2.  Acute osteomyelitis, right foot.      POSTOPERATIVE DIAGNOSES:   1.  Equinus, right lower extremity.  2.  Acute osteomyelitis, right foot.      PROCEDURE PERFORMED:  1.  Gastrocnemius recession, right lower extremity.  2.  Transmetatarsal amputation, right foot.      ANESTHESIA:  General.     HEMOSTASIS:  Pneumatic thigh tourniquet set at 300 mmHg.      ESTIMATED BLOOD LOSS:  50 mL    DRAINS:  ANABEL drain.    COMPLICATIONS:  None.      CONDITION: Stable.     PATHOLOGY: bone and tissue sent to microbiology      INDICATIONS FOR PROCEDURE:  This is a patient of mine who is currently admitted to the hospital with an infection of the right foot with osteomyelitis. He has had an I&D performed by my colleague Dr. Rosenthal on this admission. He is currently scheduled for Gastrocrecession and TMA RLE.  He agrees to undergo surgical intervention at this time.  Consent is signed and documented in the chart.  History and physical exam reviewed prior to the patient being taken to the operating room and n.p.o. status was confirmed.  No guarantees were given or implied regarding the outcome of this treatment.     DESCRIPTION OF PROCEDURE:  After mild sedation was administered by the anesthesia team in the preoperative holding area, the patient was transported to the operating room and placed in the supine position.  General anesthesia was then administered and a timeout was performed to confirm the correct patient and correct site and correct procedure.  Next, the right lower extremity was prepped and draped in the usual sterile technique.      Attention was then directed to the right lower extremity, which was exsanguinated using an Esmarch bandage and then the tourniquet was rapidly inflated to 300 mmHg.  At this time, an incision was made to the distal medial leg at  the junction of the gastrocnemius aponeurosis.  This incision was deepened into the subcutaneous tissue, taking care to identify and retract all viable neurovascular structures.  Dissection was carried down to the level of the gastrocnemius aponeurosis, which was identified and transected with a straight Marte scissor.  Post transection of the aponeurosis, the foot was noted to attain more dorsiflexion at the ankle joint.  The incision site was flushed with copious amounts of normal saline solution, and then was closed utilizing 3-0 Vicryl for the deep structures, followed by 3-0 nylon in a horizontal mattress fashion for the skin.  Next, attention was directed distally where there was noted to be a large opening in the lateral foot from previous incision and drainage due to  foot infection with Osteomyelitis.  No foul odor or purulent drainage was noted at this time.  At this time, using 2 converging incisions, 1 dorsally and 1 plantarly at approximately the level of the metatarsophalangeal joints the remaining toes were disarticulated at the metatarsophalangeal joints.  Next the metatarsal heads were exposed in the incision site, and were transected with a sagittal bone saw at the distal one-third of the metatarsal shaft.  Care was taken to preserve the proper metatarsal parabola.  Next, the incision site was flushed with copious amounts of normal saline solution.  At this time, the tourniquet was deflated and all bleeders were cauterized using bipolar cautery.  Tourniquet was then reinflated and closure was performed of the amputation site with 2-0 Vicryl for the deep structures, followed by 2-0 Prolene in a vertical mattress fashion for the skin.  Prior to skin closure, a ANABEL drain was inserted to decrease the chance of postoperative hematoma.  The incision site was dressed with Xeroform for both the transmetatarsal amputation site and the gastrocnemius recession incision site, and was continued to dress with  sterile 4 x 4, sterile ABD, sterile Webril and a well-padded posterior mold splint.  Tourniquet was deflated prior to splint application.  General anesthesia was then reversed and the patient was transported from the operating room to the PACU with vital signs stable and neurovascular status intact to the operative extremity.     PLAN:  Discharge this patient back to the floor once stable per Anesthesia.  He can resume his normal diet as tolerated.  He is to be non-weightbearing to the right lower extremity.  The drain will be pulled in the next couple of days.  He is to continue antibiotics per his primary care team.        CC:            Marco A Thompson DPM  Dictation Date/Time: 03/06/2017 19:16:02(Eastern Time Zone)  Transcribed Date/Time: 03/06/2017 20:24:10 (Eastern Time Zone)  Dictator/ Initials:  Bao  Document ID:                [id number]  Job ID: [id number]

## 2017-03-07 NOTE — PLAN OF CARE
"Problem: Patient Care Overview (Adult)  Goal: Plan of Care Review  Outcome: Ongoing (interventions implemented as appropriate)    03/07/17 0327   Coping/Psychosocial Response Interventions   Plan Of Care Reviewed With patient   Patient Care Overview   Progress improving   Outcome Evaluation   Outcome Summary/Follow up Plan No pain complaints. Felt \"groggy\" d/t Resteril last night. Urinating fine.  last night. Pt refused Levemir.        Goal: Adult Individualization and Mutuality  Outcome: Ongoing (interventions implemented as appropriate)  Goal: Discharge Needs Assessment  Outcome: Ongoing (interventions implemented as appropriate)    Problem: Cellulitis (Adult)  Goal: Signs and Symptoms of Listed Potential Problems Will be Absent or Manageable (Cellulitis)  Outcome: Ongoing (interventions implemented as appropriate)    Problem: Diabetes, Type 2 (Adult)  Goal: Signs and Symptoms of Listed Potential Problems Will be Absent or Manageable (Diabetes, Type 2)  Outcome: Ongoing (interventions implemented as appropriate)      "

## 2017-03-07 NOTE — PROGRESS NOTES
Acute Care - Physical Therapy Treatment Note  [facility]     Patient Name: Emre Lisa  : 1960  MRN: [id number]  Today's Date: 3/7/2017     Date of Referral to PT: 17  Referring Physician: Dr. Bravo    Admit Date: 3/2/2017    Visit Dx:    ICD-10-CM ICD-9-CM   1. Acute osteomyelitis of right foot M86.171 730.07   2. Impaired physical mobility Z74.09 781.99     Patient Active Problem List   Diagnosis   • Foot osteomyelitis, right   • Nodule of groin   • Type 2 diabetes mellitus with skin complication   • Status post transmetatarsal amputation of right foot                   IP PT Goals       17 1713 17 1129       Transfer Training PT LTG    Transfer Training PT LTG, Date Established  17  -MP     Transfer Training PT LTG, Time to Achieve  by discharge  -MP     Transfer Training PT LTG, Activity Type  all transfers  -MP     Transfer Training PT LTG, [place] Level  conditional independence  -MP     Transfer Training PT LTG, Assist Device  walker, rolling  -MP     Transfer Training PT  LTG, Date Goal Reviewed 17  -MP      Transfer Training PT LTG, Outcome goal ongoing  -MP      Gait Training PT LTG    Gait Training Goal PT LTG, Date Established  17  -MP     Gait Training Goal PT LTG, Time to Achieve  by discharge  -MP     Gait Training Goal PT LTG, [place] Level  conditional independence  -MP     Gait Training Goal PT LTG, Assist Device  walker, rolling   knee sling  -MP     Gait Training Goal PT LTG, Distance to Achieve  50  -MP     Gait Training Goal PT LTG, Date Goal Reviewed 17  -MP      Gait Training Goal PT LTG, Outcome goal ongoing  -MP      Stair Training PT LTG    Stair Training Goal PT LTG, Date Established  17  -MP     Stair Training Goal PT LTG, Time to Achieve  by discharge  -MP     Stair Training Goal PT LTG, Number of Steps  4  -MP     Stair Training Goal PT LTG, [place] Level  conditional independence  -MP     Stair  Training Goal PT LTG, Assist Device  walker, rolling  -MP     Stair Training Goal PT LTG, Date Goal Reviewed 03/07/17  -MP      Stair Training Goal PT LTG, Outcome goal ongoing  -MP        User Key  (r) = Recorded By, (t) = Taken By, (c) = Cosigned By    Initials Name Provider Type    MP Tung Liu PT Physical Therapist          Physical Therapy Education     Title: PT OT SLP Therapies (Active)     Topic: Physical Therapy (Active)     Point: Mobility training (Done)    Learning Progress Summary    Learner Readiness Method Response Comment Documented by Status   Patient Acceptance E VU  MP 03/07/17 1717 Done    Acceptance E VU  MP 03/06/17 1132 Done               Point: Body mechanics (Done)    Learning Progress Summary    Learner Readiness Method Response Comment Documented by Status   Patient Acceptance E VU  MP 03/07/17 1717 Done    Acceptance E VU  MP 03/06/17 1132 Done               Point: Precautions (Done)    Learning Progress Summary    Learner Readiness Method Response Comment Documented by Status   Patient Acceptance E VU  MP 03/07/17 1717 Done    Acceptance E VU  MP 03/06/17 1132 Done                      User Key     Initials Effective Dates Name Provider Type Discipline     10/17/16 -  Tung Liu PT Physical Therapist PT                    PT Recommendation and Plan  Anticipated Equipment Needs At Discharge: front wheeled walker, knee sling, rolling knee walker  Anticipated Discharge Disposition: inpatient rehabilitation facility  Planned Therapy Interventions: balance training, gait training, stair training, transfer training  PT Frequency: 5 times/wk  Plan of Care Review  Plan Of Care Reviewed With: patient  Outcome Summary/Follow up Plan: Pt did well with knee scooter and ambulated aprox 250 feet. Pt would benefit from knee scooter or knee sling on RW upon d/c. PT cont to recommend TCU or home with HH PT if home is set up properly for pt.           Outcome Measures       03/07/17 1700  03/06/17 1100       How much help from another person do you currently need...    Turning from your back to your side while in flat bed without using bedrails? 4  -MP 4  -MP     Moving from lying on back to sitting on the side of a flat bed without bedrails? 4  -MP 4  -MP     Moving to and from a bed to a chair (including a wheelchair)? 3  -MP 3  -MP     Standing up from a chair using your arms (e.g., wheelchair, bedside chair)? 4  -MP 4  -MP     Climbing 3-5 steps with a railing? 2  -MP 2  -MP     To walk in hospital room? 3  -MP 3  -MP     AM-PAC 6 Clicks Score 20  -MP 20  -MP     Functional Assessment    Outcome Measure Options AM-PAC 6 Clicks Basic Mobility (PT)  -MP AM-PAC 6 Clicks Basic Mobility (PT)  -MP       User Key  (r) = Recorded By, (t) = Taken By, (c) = Cosigned By    Initials Name Provider Type    PERNELL Liu PT Physical Therapist           Time Calculation:         PT Charges       03/07/17 1717          Time Calculation    Start Time 0921  -MP      Stop Time 0948  -MP      Time Calculation (min) 27 min  -MP      PT Received On 03/07/17  -MP      Time Calculation- PT    Total Timed Code Minutes- PT 27 minute(s)  -MP        User Key  (r) = Recorded By, (t) = Taken By, (c) = Cosigned By    Initials Name Provider Type    PERNELL Liu PT Physical Therapist          Therapy Charges for Today     Code Description Service Date Service Provider Modifiers Qty    66911944424 HC PT MOBILITY CURRENT 3/6/2017 Tung Liu, PT GP, CJ 1    33153305012 HC PT MOBILITY PROJECTED 3/6/2017 Tung Liu PT GP, CI 1    53575114140 HC PT EVAL MOD COMPLEXITY 1 3/6/2017 Tung Liu PT GP 1    21266575849 HC PT THERAPEUTIC ACT EA 15 MIN 3/6/2017 Tung Liu PT GP 1          PT G-Codes  PT Professional Judgement Used?: Yes  Outcome Measure Options: AM-PAC 6 Clicks Basic Mobility (PT)  Score: 20  Functional Limitation: Mobility: Walking and moving around  Mobility: Walking and Moving Around  Current Status (): At least 20 percent but less than 40 percent impaired, limited or restricted  Mobility: Walking and Moving Around Goal Status (): At least 1 percent but less than 20 percent impaired, limited or restricted    Tung Liu, PT  3/7/2017

## 2017-03-07 NOTE — PLAN OF CARE
Problem: Patient Care Overview (Adult)  Goal: Plan of Care Review  Outcome: Ongoing (interventions implemented as appropriate)    03/07/17 1713   Coping/Psychosocial Response Interventions   Plan Of Care Reviewed With patient   Outcome Evaluation   Outcome Summary/Follow up Plan Pt did well with knee scooter and ambulated aprox 250 feet. Pt would benefit from knee scooter or knee sling on RW upon d/c. PT cont to recommend TCU or home with HH PT if home is set up properly for pt.          Problem: Inpatient Physical Therapy  Goal: Transfer Training Goal 1 LTG- PT  Outcome: Ongoing (interventions implemented as appropriate)    03/06/17 1129 03/07/17 1713   Transfer Training PT LTG   Transfer Training PT LTG, Date Established 03/06/17 --    Transfer Training PT LTG, Time to Achieve by discharge --    Transfer Training PT LTG, Activity Type all transfers --    Transfer Training PT LTG, [place] Level conditional independence --    Transfer Training PT LTG, Assist Device walker, rolling --    Transfer Training PT LTG, Date Goal Reviewed --  03/07/17   Transfer Training PT LTG, Outcome --  goal ongoing       Goal: Gait Training Goal LTG- PT  Outcome: Ongoing (interventions implemented as appropriate)    03/06/17 1129 03/07/17 1713   Gait Training PT LTG   Gait Training Goal PT LTG, Date Established 03/06/17 --    Gait Training Goal PT LTG, Time to Achieve by discharge --    Gait Training Goal PT LTG, [place] Level conditional independence --    Gait Training Goal PT LTG, Assist Device walker, rolling  (knee sling) --    Gait Training Goal PT LTG, Distance to Achieve 50 --    Gait Training Goal PT LTG, Date Goal Reviewed --  03/07/17   Gait Training Goal PT LTG, Outcome --  goal ongoing       Goal: Stair Training Goal LTG- PT  Outcome: Ongoing (interventions implemented as appropriate)    03/06/17 1129 03/07/17 1713   Stair Training PT LTG   Stair Training Goal PT LTG, Date Established 03/06/17 --    Stair  Training Goal PT LTG, Time to Achieve by discharge --    Stair Training Goal PT LTG, Number of Steps 4 --    Stair Training Goal PT LTG, [place] Level conditional independence --    Stair Training Goal PT LTG, Assist Device walker, rolling --    Stair Training Goal PT LTG, Date Goal Reviewed --  03/07/17   Stair Training Goal PT LTG, Outcome --  goal ongoing

## 2017-03-07 NOTE — PROGRESS NOTES
LOS: 4 days   Patient Care Team:  Jamaal Bravo MD as PCP - General    Chief Complaint: right foot infection      Subjective   Patient seen at bedside resting comfortably. NAD. Denies n/v/f/c/sob/cp. He has minimal pain.      History taken from: patient    Objective     Vital Signs  Temp:  [97.5 °F (36.4 °C)-100 °F (37.8 °C)] 98.5 °F (36.9 °C)  Heart Rate:  [75-99] 77  Resp:  [18] 18  BP: ()/(54-76) 117/76    Objective     Constitutional: well developed, well nourished    HEENT: Normocephalic and atraumatic, normal hearing    Respiratory: Non labored respirations noted    RLE: posterior splint is c/d/i. Drain with 10mL of serosanguinous fluid. No pain. Negative homans.        Results Review:     I reviewed the patient's new clinical results.      Assessment/Plan     Active Problems:    Foot osteomyelitis, right    Nodule of groin    Type 2 diabetes mellitus with skin complication    Status post transmetatarsal amputation of right foot      VSS, afebrile  No leukocytosis  S/p I&D right foot by Dr. Rosenthal on 3/2, Gastrocrecession and TMA on 3/5/17.  No further surgery planned on this admission  Will pull drain and change dressing tomorrow 3/7/17  NWB to RLE with knee scooter  Continue antibiotics  Please do not hesitate to call with questions          This document has been electronically signed by Marco A Thompson DPM on March 6, 2017 6:18 PM

## 2017-03-07 NOTE — PROGRESS NOTES
"   LOS: 5 days   Patient Care Team:  Jamaal Bravo MD as PCP - General    Chief Complaint: \"Feeling a little loopy from sleep medicine, but rested better; not able to do much with PT yet.\"    Subjective     History of Present Illness      Subjective:  Symptoms:  Stable.  No shortness of breath, malaise, cough, chest pain, weakness, headache, chest pressure, anorexia, diarrhea or anxiety.    Diet:  Adequate intake.  No nausea or vomiting.    Activity level: Activity impairment: Impaired/recent Right foot surgery.    Pain:  He complains of pain that is mild.  He reports pain is improving.  Pain is well controlled.        Patient states doing \"OK\" after Right TransMetatarsal Amputation of foot by Dr. Thompson ([facility]).  He is not sleeping well, some fever/chills reported and states \"room temp is not well controlled.\"  Patient's BS have been good, and he has NOT required any Levemir insulin, but he states they are NOT bringing him an ADA diet every meal (sometimes Regular).  He states his Right foot pain is not bad, but feels muscle ache after surgery.  No PT at this point has been by, he states.  Otherwise doing well.   Patient denies HA, CP, palpitations, SOA/wheezing, n/v, diarrhea/constipation. A 10 point ROS was completed.  Other than the above pertinent positives, all systems negative.  History taken from: patient & chart.    Objective     Vital Signs  Temp:  [97.5 °F (36.4 °C)-100 °F (37.8 °C)] 99.8 °F (37.7 °C)  Heart Rate:  [75-99] 80  Resp:  [18] 18  BP: ()/(58-76) 119/72    Objective:  General Appearance:  Comfortable and in no acute distress.    Vital signs: (most recent): Blood pressure 119/72, pulse 80, temperature 99.8 °F (37.7 °C), temperature source Oral, resp. rate 18, height 76\" (193 cm), weight 274 lb (124 kg), SpO2 98 %.  Vital signs are normal.  No fever.    Output: Producing urine and no stool output.    HEENT: Normal HEENT exam.    Lungs:  Normal respiratory rate and normal effort.  He is not " in respiratory distress.  Breath sounds clear to auscultation.  No stridor.  No wheezes or rhonchi.    Heart: Normal rate.  Regular rhythm.  S1 normal and S2 normal.    Chest: No chest wall tenderness.  Symmetric chest wall expansion.   Abdomen: Abdomen is soft and non-distended.  There are no signs of ascites.  Bowel sounds are normal.   There is no epigastric area or no suprapubic area tenderness.  There is no rebound tenderness.  There is no guarding.   There is no mass. There is no splenomegaly. There is no hepatomegaly.   Extremities: Normal range of motion.  There is no local swelling or dependent edema.  (Right foot wrapped/elevated after surgery; C/D/I)  Pulses: Distal pulses are intact.    Neurological: Patient is alert and oriented to person, place and time.    Pupils:  Pupils are equal, round, and reactive to light.    Skin:  Warm and dry.  No rash.             Results Review:     I reviewed the patient's new clinical results.     Lab Results (last 24 hours)     Procedure Component Value Units Date/Time    POC Glucose Fingerstick [[id number]]  (Normal) Collected:  03/04/17 1142    Specimen:  Blood Updated:  03/06/17 0804     Glucose 89 mg/dL       Meter: [id number]: [id number] SHANIQUA LOO       POC Glucose Fingerstick [[id number]]  (Normal) Collected:  03/05/17 2100    Specimen:  Blood Updated:  03/06/17 0805     Glucose 99 mg/dL       Meter: [id number]: [id number] VICKI DIANELYS       POC Glucose Fingerstick [[id number]]  (Normal) Collected:  03/05/17 0716    Specimen:  Blood Updated:  03/06/17 0810     Glucose 84 mg/dL       Meter: [id number]: [id number] XOCHITL BROOKLYN       POC Glucose Fingerstick [[id number]]  (Normal) Collected:  03/05/17 1618    Specimen:  Blood Updated:  03/06/17 0810     Glucose 81 mg/dL       Meter: [id number]: [id number] ASHLEE MARCK       Tissue Exam [[id number]] Collected:  03/05/17 0947    Specimen:  Tissue from Toe, Right Updated:  03/06/17  1053    POC Glucose Fingerstick [[id number]]  (Normal) Collected:  03/06/17 0844    Specimen:  Blood Updated:  03/06/17 1203     Glucose 128 mg/dL       Sliding Scale AdminMeter: [id number]: [id number] [facility]       POC Glucose Fingerstick [[id number]]  (Abnormal) Collected:  03/06/17 1147    Specimen:  Blood Updated:  03/06/17 1203     Glucose 133 (H) mg/dL       RN NotifiedMeter: [id number]: [id number] [facility]       Blood Culture [[id number]]  (Normal) Collected:  03/02/17 1302    Specimen:  Blood from Arm, Left Updated:  03/06/17 1401     Blood Culture No growth at 4 days     Blood Culture [[id number]]  (Normal) Collected:  03/02/17 1305    Specimen:  Blood from Arm, Right Updated:  03/06/17 1401     Blood Culture No growth at 4 days           Medication Review:     Current Facility-Administered Medications:   •  acetaminophen (TYLENOL) tablet 650 mg, 650 mg, Oral, Q4H PRN, Jamaal Bravo MD, 650 mg at 03/06/17 1627  •  bacitracin injection, , , PRN, Marco A Thompson DPM, 50,000 Units at 03/05/17 0839  •  bupivacaine (PF) (MARCAINE) 0.5 % injection, , , PRN, Tung Rosenthal DPM, 30 mL at 03/05/17 0839  •  dronedarone (MULTAQ) tablet 400 mg, 400 mg, Oral, Nightly, Jamaal Bravo MD, 400 mg at 03/06/17 2225  •  enoxaparin (LOVENOX) syringe 40 mg, 40 mg, Subcutaneous, Daily, Jamaal Bravo MD, 40 mg at 03/06/17 0846  •  fluticasone (FLONASE) 50 MCG/ACT nasal spray 1 spray, 1 spray, Each Nare, BID, Jamaal Bravo MD, 1 spray at 03/06/17 1828  •  gabapentin (NEURONTIN) capsule 300 mg, 300 mg, Oral, Q8H, Jamaal Bravo MD, 300 mg at 03/06/17 2226  •  HYDROcodone-acetaminophen (NORCO) 7.5-325 MG per tablet 1 tablet, 1 tablet, Oral, Q6H PRN, Tung Rosenthal DPM, 1 tablet at 03/05/17 2058  •  insulin aspart (novoLOG) injection 0-9 Units, 0-9 Units, Subcutaneous, 4x Daily AC & at Bedtime, Jamaal Bravo MD, 0 Units at 03/02/17 1834  •  insulin detemir (LEVEMIR) injection 10  Units, 10 Units, Subcutaneous, Nightly, Jamaal Bravo MD, 10 Units at 03/03/17 2115  •  magnesium oxide (MAGOX) tablet 400 mg, 400 mg, Oral, Daily, Jamaal Bravo MD, 400 mg at 03/06/17 0846  •  metoprolol tartrate (LOPRESSOR) half tablet 12.5 mg, 12.5 mg, Oral, BID, Jamaal Bravo MD, 12.5 mg at 03/04/17 1725  •  Morphine sulfate (PF) injection 2 mg, 2 mg, Intravenous, Q2H PRN, Jamala Bravo MD  •  pantoprazole (PROTONIX) EC tablet 40 mg, 40 mg, Oral, Q AM, Jamaal Bravo MD, 40 mg at 03/06/17 0601  •  Pharmacy to dose vancomycin, , Does not apply, Continuous PRN, Jamaal Bravo MD  •  sodium chloride (NS) irrigation solution, , , PRN, Marco A Thompson, CHRISTIAN, 2,000 mL at 03/05/17 0841  •  sodium chloride 0.9 % flush 1-10 mL, 1-10 mL, Intravenous, PRN, Jamaal Bravo MD, 10 mL at 03/02/17 1835  •  temazepam (RESTORIL) capsule 15 mg, 15 mg, Oral, Nightly PRN, Jamaal Bravo MD, 15 mg at 03/06/17 2226  •  valsartan (DIOVAN) tablet 40 mg, 40 mg, Oral, Nightly, Jamaal Bravo MD, 40 mg at 03/05/17 2056  •  vancomycin (VANCOCIN) 2,250 mg in sodium chloride 0.9 % 500 mL IVPB, 2,250 mg, Intravenous, Q12H, Jamaal Bravo MD, 2,250 mg at 03/07/17 0311      Assessment/Plan     Active Problems:    Foot osteomyelitis, right    Nodule of groin    Type 2 diabetes mellitus with skin complication    Status post transmetatarsal amputation of right foot      Assessment & Plan     1. POD #2 Right foot TMA (I&D was on 3/2):  Doing well/healing up; pain controlled.  PT ordered/began 3/6/17.  Dr. Thompson coordinating his POC.  CM consult for d/c planning in the next 2-3 days; whenever Dr. Thompson feels patient ready to be safely d/c'd home.  Plan will be to go home with a 4-wheeled/knee walker type scooter and home vs outpatient PT/rehab.    2. T2 DM:  Controlled with SSI/ADA diet for now -- improved/correct ADA 2000 Lei diet.  Resume home/oral DM meds at d/c.     3. Right groin LNs:  No change.  F/u as  outpatient.     Jamaal Bravo MD  03/07/17  5:04 AM    Time: 15 min

## 2017-03-07 NOTE — PLAN OF CARE
Problem: Patient Care Overview (Adult)  Goal: Plan of Care Review  Outcome: Ongoing (interventions implemented as appropriate)    03/07/17 9778   Coping/Psychosocial Response Interventions   Plan Of Care Reviewed With patient   Patient Care Overview   Progress improving   Outcome Evaluation   Outcome Summary/Follow up Plan no pain working towards going to go home, no s/s infection on antibiotic, blood sugars remain normal, dressing changed per md edema noted to lower extremitie       Goal: Adult Individualization and Mutuality  Outcome: Ongoing (interventions implemented as appropriate)    Problem: Cellulitis (Adult)  Goal: Signs and Symptoms of Listed Potential Problems Will be Absent or Manageable (Cellulitis)  Outcome: Ongoing (interventions implemented as appropriate)    Problem: Diabetes, Type 2 (Adult)  Goal: Signs and Symptoms of Listed Potential Problems Will be Absent or Manageable (Diabetes, Type 2)  Outcome: Outcome(s) achieved Date Met:  03/07/17

## 2017-03-08 LAB
ALBUMIN SERPL-MCNC: 3.6 G/DL (ref 3.4–4.8)
ALBUMIN/GLOB SERPL: 1 G/DL (ref 1.1–1.8)
ALP SERPL-CCNC: 54 U/L (ref 38–126)
ALT SERPL W P-5'-P-CCNC: 28 U/L (ref 21–72)
ANION GAP SERPL CALCULATED.3IONS-SCNC: 11 MMOL/L (ref 5–15)
AST SERPL-CCNC: 21 U/L (ref 17–59)
BASOPHILS # BLD AUTO: 0.02 10*3/MM3 (ref 0–0.2)
BASOPHILS NFR BLD AUTO: 0.3 % (ref 0–2)
BILIRUB SERPL-MCNC: 0.8 MG/DL (ref 0.2–1.3)
BUN BLD-MCNC: 22 MG/DL (ref 7–21)
BUN/CREAT SERPL: 23.2 (ref 7–25)
CALCIUM SPEC-SCNC: 8.8 MG/DL (ref 8.4–10.2)
CHLORIDE SERPL-SCNC: 99 MMOL/L (ref 95–110)
CO2 SERPL-SCNC: 29 MMOL/L (ref 22–31)
CREAT BLD-MCNC: 0.95 MG/DL (ref 0.7–1.3)
DEPRECATED RDW RBC AUTO: 43.4 FL (ref 35.1–43.9)
EOSINOPHIL # BLD AUTO: 0.13 10*3/MM3 (ref 0–0.7)
EOSINOPHIL NFR BLD AUTO: 1.9 % (ref 0–7)
ERYTHROCYTE [DISTWIDTH] IN BLOOD BY AUTOMATED COUNT: 13.9 % (ref 11.5–14.5)
GFR SERPL CREATININE-BSD FRML MDRD: 83 ML/MIN/1.73 (ref 60–130)
GLOBULIN UR ELPH-MCNC: 3.6 GM/DL (ref 2.3–3.5)
GLUCOSE BLD-MCNC: 109 MG/DL (ref 60–100)
GLUCOSE BLDC GLUCOMTR-MCNC: 115 MG/DL (ref 70–130)
GLUCOSE BLDC GLUCOMTR-MCNC: 116 MG/DL (ref 70–130)
GLUCOSE BLDC GLUCOMTR-MCNC: 117 MG/DL (ref 70–130)
GLUCOSE BLDC GLUCOMTR-MCNC: 133 MG/DL (ref 70–130)
HCT VFR BLD AUTO: 35.3 % (ref 39–49)
HGB BLD-MCNC: 12 G/DL (ref 13.7–17.3)
IMM GRANULOCYTES # BLD: 0.01 10*3/MM3 (ref 0–0.02)
IMM GRANULOCYTES NFR BLD: 0.1 % (ref 0–0.5)
LYMPHOCYTES # BLD AUTO: 1.09 10*3/MM3 (ref 0.6–4.2)
LYMPHOCYTES NFR BLD AUTO: 16.3 % (ref 10–50)
MCH RBC QN AUTO: 29 PG (ref 26.5–34)
MCHC RBC AUTO-ENTMCNC: 34 G/DL (ref 31.5–36.3)
MCV RBC AUTO: 85.3 FL (ref 80–98)
MONOCYTES # BLD AUTO: 0.93 10*3/MM3 (ref 0–0.9)
MONOCYTES NFR BLD AUTO: 13.9 % (ref 0–12)
NEUTROPHILS # BLD AUTO: 4.51 10*3/MM3 (ref 2–8.6)
NEUTROPHILS NFR BLD AUTO: 67.5 % (ref 37–80)
PLATELET # BLD AUTO: 158 10*3/MM3 (ref 150–450)
PMV BLD AUTO: 10.8 FL (ref 8–12)
POTASSIUM BLD-SCNC: 3.9 MMOL/L (ref 3.5–5.1)
PROT SERPL-MCNC: 7.2 G/DL (ref 6.3–8.6)
RBC # BLD AUTO: 4.14 10*6/MM3 (ref 4.37–5.74)
SODIUM BLD-SCNC: 139 MMOL/L (ref 137–145)
WBC NRBC COR # BLD: 6.69 10*3/MM3 (ref 3.2–9.8)

## 2017-03-08 PROCEDURE — 82962 GLUCOSE BLOOD TEST: CPT

## 2017-03-08 PROCEDURE — 80053 COMPREHEN METABOLIC PANEL: CPT | Performed by: INTERNAL MEDICINE

## 2017-03-08 PROCEDURE — 25010000002 ENOXAPARIN PER 10 MG: Performed by: FAMILY MEDICINE

## 2017-03-08 PROCEDURE — 97110 THERAPEUTIC EXERCISES: CPT

## 2017-03-08 PROCEDURE — 25010000002 VANCOMYCIN PER 500 MG: Performed by: FAMILY MEDICINE

## 2017-03-08 PROCEDURE — 85025 COMPLETE CBC W/AUTO DIFF WBC: CPT | Performed by: INTERNAL MEDICINE

## 2017-03-08 PROCEDURE — 97150 GROUP THERAPEUTIC PROCEDURES: CPT

## 2017-03-08 PROCEDURE — 97116 GAIT TRAINING THERAPY: CPT

## 2017-03-08 PROCEDURE — 99024 POSTOP FOLLOW-UP VISIT: CPT | Performed by: PODIATRIST

## 2017-03-08 RX ORDER — LIDOCAINE 50 MG/G
OINTMENT TOPICAL AS NEEDED
Status: DISCONTINUED | OUTPATIENT
Start: 2017-03-08 | End: 2017-03-09 | Stop reason: HOSPADM

## 2017-03-08 RX ADMIN — VANCOMYCIN HYDROCHLORIDE 2250 MG: 5 INJECTION, POWDER, LYOPHILIZED, FOR SOLUTION INTRAVENOUS at 03:17

## 2017-03-08 RX ADMIN — ACETAMINOPHEN 650 MG: 325 TABLET ORAL at 06:26

## 2017-03-08 RX ADMIN — ACETAMINOPHEN 650 MG: 325 TABLET ORAL at 21:29

## 2017-03-08 RX ADMIN — DRONEDARONE 400 MG: 400 TABLET, FILM COATED ORAL at 21:29

## 2017-03-08 RX ADMIN — MAGNESIUM OXIDE TAB 400 MG (241.3 MG ELEMENTAL MG) 400 MG: 400 (241.3 MG) TAB at 08:23

## 2017-03-08 RX ADMIN — GABAPENTIN 300 MG: 300 CAPSULE ORAL at 06:27

## 2017-03-08 RX ADMIN — GABAPENTIN 300 MG: 300 CAPSULE ORAL at 15:16

## 2017-03-08 RX ADMIN — ENOXAPARIN SODIUM 40 MG: 40 INJECTION SUBCUTANEOUS at 08:23

## 2017-03-08 RX ADMIN — FLUTICASONE PROPIONATE 1 SPRAY: 50 SPRAY, METERED NASAL at 08:23

## 2017-03-08 RX ADMIN — PANTOPRAZOLE SODIUM 40 MG: 40 TABLET, DELAYED RELEASE ORAL at 06:27

## 2017-03-08 RX ADMIN — ACETAMINOPHEN 650 MG: 325 TABLET ORAL at 15:16

## 2017-03-08 RX ADMIN — GABAPENTIN 300 MG: 300 CAPSULE ORAL at 21:30

## 2017-03-08 NOTE — PLAN OF CARE
Problem: Patient Care Overview (Adult)  Goal: Plan of Care Review  Outcome: Ongoing (interventions implemented as appropriate)    03/08/17 0920   Coping/Psychosocial Response Interventions   Plan Of Care Reviewed With patient   Patient Care Overview   Progress progress toward functional goals as expected   Outcome Evaluation   Outcome Summary/Follow up Plan No PT goals met this tx. pt tolerated gt well (200 ft) w/knee scooter CGA. Pt would benefit from HH PT once discharged from facility.        Goal: Discharge Needs Assessment  Outcome: Ongoing (interventions implemented as appropriate)    03/08/17 0920   Discharge Needs Assessment   Concerns To Be Addressed discharge planning concerns   Readmission Within The Last 30 Days no previous admission in last 30 days   Equipment Needed After Discharge (knee scooter)   Discharge Facility/Level Of Care Needs home with home health   Current Discharge Risk physical impairment   Current Health   Anticipated Changes Related to Illness inability to care for self   Self-Care   Equipment Currently Used at Home none   Living Environment   Transportation Available family or friend will provide         Problem: Inpatient Physical Therapy  Goal: Transfer Training Goal 1 LTG- PT  Outcome: Ongoing (interventions implemented as appropriate)    03/06/17 1129 03/08/17 0920   Transfer Training PT LTG   Transfer Training PT LTG, Date Established 03/06/17 --    Transfer Training PT LTG, Time to Achieve by discharge --    Transfer Training PT LTG, Activity Type all transfers --    Transfer Training PT LTG, [place] Level conditional independence --    Transfer Training PT LTG, Assist Device walker, rolling --    Transfer Training PT LTG, Date Goal Reviewed --  03/08/17   Transfer Training PT LTG, Outcome --  goal not met       Goal: Gait Training Goal LTG- PT  Outcome: Ongoing (interventions implemented as appropriate)    03/06/17 1129 03/08/17 0920   Gait Training PT LTG   Gait Training  Goal PT LTG, Date Established 03/06/17 --    Gait Training Goal PT LTG, Time to Achieve by discharge --    Gait Training Goal PT LTG, [place] Level conditional independence --    Gait Training Goal PT LTG, Assist Device walker, rolling  (knee sling) --    Gait Training Goal PT LTG, Distance to Achieve 50 --    Gait Training Goal PT LTG, Date Goal Reviewed --  03/08/17   Gait Training Goal PT LTG, Outcome --  goal not met       Goal: Stair Training Goal LTG- PT  Outcome: Ongoing (interventions implemented as appropriate)    03/06/17 1129 03/08/17 0920   Stair Training PT LTG   Stair Training Goal PT LTG, Date Established 03/06/17 --    Stair Training Goal PT LTG, Time to Achieve by discharge --    Stair Training Goal PT LTG, Number of Steps 4 --    Stair Training Goal PT LTG, [place] Level conditional independence --    Stair Training Goal PT LTG, Assist Device walker, rolling --    Stair Training Goal PT LTG, Date Goal Reviewed --  03/08/17   Stair Training Goal PT LTG, Outcome --  goal not met

## 2017-03-08 NOTE — PLAN OF CARE
Problem: Patient Care Overview (Adult)  Goal: Plan of Care Review  Outcome: Ongoing (interventions implemented as appropriate)    03/08/17 1627   Coping/Psychosocial Response Interventions   Plan Of Care Reviewed With patient   Patient Care Overview   Progress progress toward functional goals as expected       Goal: Adult Individualization and Mutuality  Outcome: Ongoing (interventions implemented as appropriate)    03/08/17 1627   Individualization   Patient Specific Preferences prefers scooter to walk with   Patient Specific Goals patient to be d/c'ed 3/9/17       Goal: Discharge Needs Assessment  Outcome: Ongoing (interventions implemented as appropriate)    03/08/17 1627   Discharge Needs Assessment   Concerns Comments lift chair needed   Discharge Planning Comments scooter at bedside delivered from [employer]; still needing lfift chair         Problem: Cellulitis (Adult)  Goal: Signs and Symptoms of Listed Potential Problems Will be Absent or Manageable (Cellulitis)  Outcome: Ongoing (interventions implemented as appropriate)    03/08/17 1627   Cellulitis   Problems Assessed (Cellulitis) all   Problems Present (Cellulitis) pain;infection   Pt improving    Problem: Amputation, Lower Extremity (Adult)  Goal: Signs and Symptoms of Listed Potential Problems Will be Absent or Manageable (Amputation, Lower Extremity)  Outcome: Ongoing (interventions implemented as appropriate)    03/08/17 1627   Amputation, Lower Extremity   Problems Assessed (Lower Extremity Amputation) all   Problems Present (Lower Extremity Amputation) pain;wound complications   Patient progressing

## 2017-03-08 NOTE — PLAN OF CARE
Problem: Patient Care Overview (Adult)  Goal: Plan of Care Review  Outcome: Ongoing (interventions implemented as appropriate)    03/08/17 1305   Coping/Psychosocial Response Interventions   Plan Of Care Reviewed With patient   Patient Care Overview   Progress progress toward functional goals as expected   Outcome Evaluation   Outcome Summary/Follow up Plan No PT goals met this tx. pt tolerated gt well (200 ft) w/knee scooter SBA/CGA. Pt would benefit from HH PT once discharged from facility.        Goal: Discharge Needs Assessment  Outcome: Ongoing (interventions implemented as appropriate)    03/08/17 1305   Discharge Needs Assessment   Concerns To Be Addressed discharge planning concerns   Readmission Within The Last 30 Days no previous admission in last 30 days   Equipment Needed After Discharge none   Discharge Facility/Level Of Care Needs home with home health   Current Discharge Risk physical impairment   Current Health   Anticipated Changes Related to Illness inability to care for self   Self-Care   Equipment Currently Used at Home none   Living Environment   Transportation Available family or friend will provide         Problem: Inpatient Physical Therapy  Goal: Transfer Training Goal 1 LTG- PT  Outcome: Ongoing (interventions implemented as appropriate)    03/06/17 1129 03/08/17 1305   Transfer Training PT LTG   Transfer Training PT LTG, Date Established 03/06/17 --    Transfer Training PT LTG, Time to Achieve by discharge --    Transfer Training PT LTG, Activity Type all transfers --    Transfer Training PT LTG, [place] Level conditional independence --    Transfer Training PT LTG, Assist Device walker, rolling --    Transfer Training PT LTG, Date Goal Reviewed --  03/08/17   Transfer Training PT LTG, Outcome --  goal not met       Goal: Gait Training Goal LTG- PT  Outcome: Ongoing (interventions implemented as appropriate)    03/06/17 1129 03/08/17 1305   Gait Training PT LTG   Gait Training Goal PT  LTG, Date Established 03/06/17 --    Gait Training Goal PT LTG, Time to Achieve by discharge --    Gait Training Goal PT LTG, [place] Level conditional independence --    Gait Training Goal PT LTG, Assist Device walker, rolling  (knee sling) --    Gait Training Goal PT LTG, Distance to Achieve 50 --    Gait Training Goal PT LTG, Date Goal Reviewed --  03/08/17   Gait Training Goal PT LTG, Outcome --  goal not met       Goal: Stair Training Goal LTG- PT  Outcome: Ongoing (interventions implemented as appropriate)    03/06/17 1129 03/08/17 1305   Stair Training PT LTG   Stair Training Goal PT LTG, Date Established 03/06/17 --    Stair Training Goal PT LTG, Time to Achieve by discharge --    Stair Training Goal PT LTG, Number of Steps 4 --    Stair Training Goal PT LTG, [place] Level conditional independence --    Stair Training Goal PT LTG, Assist Device walker, rolling --    Stair Training Goal PT LTG, Date Goal Reviewed --  03/08/17   Stair Training Goal PT LTG, Outcome --  goal not met

## 2017-03-08 NOTE — PROGRESS NOTES
Acute Care - Physical Therapy Treatment Note  [facility]     Patient Name: Emre Lisa  : 1960  MRN: [id number]  Today's Date: 3/8/2017     Date of Referral to PT: 17  Referring Physician: Dr. Bravo    Admit Date: 3/2/2017    Visit Dx:    ICD-10-CM ICD-9-CM   1. Acute osteomyelitis of right foot M86.171 730.07   2. Impaired physical mobility Z74.09 781.99     Patient Active Problem List   Diagnosis   • Foot osteomyelitis, right   • Nodule of groin   • Type 2 diabetes mellitus with skin complication   • Status post transmetatarsal amputation of right foot               Adult Rehabilitation Note       17 0920          Rehab Assessment/Intervention    Discipline physical therapy assistant  -AM      Document Type therapy note (daily note)  -AM      Subjective Information agree to therapy;complains of;pain  -AM      Patient Effort, Rehab Treatment good  -AM      Symptoms Noted During/After Treatment none  -AM      Precautions/Limitations fall precautions;non-weight bearing status  -AM      Equipment Issued to Patient gait belt  -AM      Recorded by [AM] Denver Murcia PTA      Vital Signs    Pre Systolic BP Rehab 113  -AM      Pre Treatment Diastolic BP 57  -AM      Post Systolic BP Rehab 128  -AM      Post Treatment Diastolic BP 76  -AM      Pretreatment Heart Rate (beats/min) 77  -AM      Posttreatment Heart Rate (beats/min) 88  -AM      Pre SpO2 (%) 98  -AM      O2 Delivery Pre Treatment room air  -AM      Post SpO2 (%) 98  -AM      O2 Delivery Post Treatment room air  -AM      Pre Patient Position Supine  -AM      Intra Patient Position Standing  -AM      Post Patient Position Sitting  -AM      Recorded by [AM] Denver Murcia PTA      Pain Assessment    Pain Assessment 0-10  -AM      Pain Score 3  -AM      Post Pain Score 3  -AM      Pain Type Acute pain  -AM      Pain Location Neck  -AM      Pain Orientation Mid  -AM      Pain Descriptors Sore  -AM      Pain Frequency  Constant/continuous  -AM      Date Pain First Started 03/02/17  -AM      Clinical Progression Gradually improving  -AM      Patient's Stated Pain Goal No pain  -AM      Pain Intervention(s) Ambulation/increased activity  -AM      Result of Injury No  -AM      Work-Related Injury No  -AM      Multiple Pain Sites No  -AM      Recorded by [AM] Denver Murcia PTA      Cognitive Assessment/Intervention    Current Cognitive/Communication Assessment functional  -AM      Orientation Status oriented x 4  -AM      Follows Commands/Answers Questions 100% of the time  -AM      Personal Safety decreased awareness, need for safety  -AM      Personal Safety Interventions gait belt;nonskid shoes/slippers when out of bed;supervised activity  -AM      Recorded by [AM] Denver Murcia PTA      ROM (Range of Motion)    General ROM lower extremity range of motion deficits identified  -AM      Recorded by [AM] Denver Murcia PTA      General LE Assessment    ROM ankle, right: LE ROM deficit  -AM      ROM Detail R ankle in cast  -AM      Recorded by [AM] Denver Murcia PTA      Mobility Assessment/Training    Extremity Weight-Bearing Status right lower extremity  -AM      Right Lower Extremity Weight-Bearing non weight-bearing  -AM      Recorded by [AM] Denver Murcia PTA      Bed Mobility, Assessment/Treatment    Bed Mobility, Roll Left, [place] independent  -AM      Bed Mobility, Roll Right, [place] independent  -AM      Bed Mobility, Scoot/Bridge, [place] independent  -AM      Bed Mob, Supine to Sit, [place] independent  -AM      Bed Mob, Sit to Supine, [place] independent  -AM      Bed Mob, Sidelying to Sit, [place] not tested  -AM      Bed Mob, Sit to Sidelying, [place] not tested  -AM      Recorded by [AM] eDnver Murcia PTA      Transfer Assessment/Treatment    Transfers, Bed-Chair [place] contact guard assist  -AM      Transfers, Chair-Bed [place] contact guard assist  -AM       Transfers, Bed-Chair-Bed, Assist Device other (see comments)   knee scooter  -AM      Transfers, Sit-Stand [place] contact guard assist  -AM      Transfers, Stand-Sit [place] contact guard assist  -AM      Transfers, Sit-Stand-Sit, Assist Device other (see comments)   knee scooter  -AM      Toilet Transfer, [place] not tested  -AM      Walk-In Shower Transfer, [place] not tested  -AM      Bathtub Transfer, [place] not tested  -AM      Transfer, Maintain Weight Bearing Status able to maintain weight bearing status  -AM      Transfer, Safety Issues step length decreased  -AM      Transfer, Impairments ROM decreased;strength decreased;impaired balance;pain  -AM      Recorded by [AM] Denver Murcia PTA      Gait Assessment/Treatment    Gait, [place] Level contact guard assist  -AM      Gait, Assistive Device other (see comments)   knee scooter  -AM      Gait, Distance (Feet) 200  -AM      Gait, Gait Pattern Analysis 2-point gait  -AM      Gait, Gait Deviations bilateral:;marilee decreased  -AM      Gait, Maintain Weight Bearing Status able to maintain weight bearing status  -AM      Gait, Safety Issues step length decreased  -AM      Gait, Impairments ROM decreased;strength decreased;impaired balance;pain  -AM      Recorded by [AM] Denver Murcia PTA      Stairs Assessment/Treatment    Stairs, [place] Level not tested  -AM      Recorded by [AM] Denver Murcia PTA      Therapy Exercises    Bilateral Lower Extremities AROM:;20 reps;supine;sitting;ankle pumps/circles;glut sets;heel slides;LAQ;quad sets;SLR   toe wiggle  -AM      Recorded by [AM] Denver Murcia PTA      Positioning and Restraints    Pre-Treatment Position in bed  -AM      Post Treatment Position chair  -AM      In Chair reclined;call light within reach;encouraged to call for assist;legs elevated  -AM      Recorded by [AM] Denver Murcia PTA        User Key  (r) = Recorded By, (t) = Taken By, (c) = Cosigned By     Initials Name Effective Dates    AM Denver Murcia PTA 10/17/16 -                 IP PT Goals       03/08/17 0920 03/07/17 1713 03/06/17 1129    Transfer Training PT LTG    Transfer Training PT LTG, Date Established   03/06/17  -MP    Transfer Training PT LTG, Time to Achieve   by discharge  -MP    Transfer Training PT LTG, Activity Type   all transfers  -MP    Transfer Training PT LTG, [place] Level   conditional independence  -MP    Transfer Training PT LTG, Assist Device   walker, rolling  -MP    Transfer Training PT  LTG, Date Goal Reviewed 03/08/17  -AM 03/07/17  -MP     Transfer Training PT LTG, Outcome goal not met  -AM goal ongoing  -MP     Gait Training PT LTG    Gait Training Goal PT LTG, Date Established   03/06/17  -MP    Gait Training Goal PT LTG, Time to Achieve   by discharge  -MP    Gait Training Goal PT LTG, [place] Level   conditional independence  -MP    Gait Training Goal PT LTG, Assist Device   walker, rolling   knee sling  -MP    Gait Training Goal PT LTG, Distance to Achieve   50  -MP    Gait Training Goal PT LTG, Date Goal Reviewed 03/08/17  -AM 03/07/17  -MP     Gait Training Goal PT LTG, Outcome goal not met  -AM goal ongoing  -MP     Stair Training PT LTG    Stair Training Goal PT LTG, Date Established   03/06/17  -MP    Stair Training Goal PT LTG, Time to Achieve   by discharge  -MP    Stair Training Goal PT LTG, Number of Steps   4  -MP    Stair Training Goal PT LTG, [place] Level   conditional independence  -MP    Stair Training Goal PT LTG, Assist Device   walker, rolling  -MP    Stair Training Goal PT LTG, Date Goal Reviewed 03/08/17  -AM 03/07/17  -MP     Stair Training Goal PT LTG, Outcome goal not met  -AM goal ongoing  -MP       User Key  (r) = Recorded By, (t) = Taken By, (c) = Cosigned By    Initials Name Provider Type    AM Denver Murcia PTA Physical Therapy Assistant    PERNELL Liu, PT Physical Therapist          Physical Therapy Education      Title: PT OT SLP Therapies (Active)     Topic: Physical Therapy (Active)     Point: Mobility training (Done)    Learning Progress Summary    Learner Readiness Method Response Comment Documented by Status   Patient Acceptance E VU   03/07/17 1717 Done    Acceptance E VU   03/06/17 1132 Done               Point: Body mechanics (Done)    Learning Progress Summary    Learner Readiness Method Response Comment Documented by Status   Patient Acceptance E VU  MP 03/07/17 1717 Done    Acceptance E VU  MP 03/06/17 1132 Done               Point: Precautions (Done)    Learning Progress Summary    Learner Readiness Method Response Comment Documented by Status   Patient Acceptance E VU  MP 03/07/17 1717 Done    Acceptance E VU  MP 03/06/17 1132 Done                      User Key     Initials Effective Dates Name Provider Type Discipline     10/17/16 -  Tung Liu, PT Physical Therapist PT                    PT Recommendation and Plan  Anticipated Equipment Needs At Discharge: front wheeled walker, knee sling, rolling knee walker  Anticipated Discharge Disposition: inpatient rehabilitation facility  Planned Therapy Interventions: balance training, gait training, stair training, transfer training  PT Frequency: 5 times/wk  Plan of Care Review  Plan Of Care Reviewed With: patient  Progress: progress toward functional goals as expected  Outcome Summary/Follow up Plan: No PT goals met this tx. pt tolerated gt well (200 ft) w/knee scooter CGA. Pt would benefit from  PT once discharged from facility.           Outcome Measures       03/08/17 0920 03/07/17 1700 03/06/17 1100    How much help from another person do you currently need...    Turning from your back to your side while in flat bed without using bedrails? 4  -AM 4  -MP 4  -MP    Moving from lying on back to sitting on the side of a flat bed without bedrails? 4  -AM 4  -MP 4  -MP    Moving to and from a bed to a chair (including a wheelchair)? 3  -AM 3  -MP 3  -MP     Standing up from a chair using your arms (e.g., wheelchair, bedside chair)? 3  -AM 4  -MP 4  -MP    Climbing 3-5 steps with a railing? 1  -AM 2  -MP 2  -MP    To walk in hospital room? 3  -AM 3  -MP 3  -MP    AM-PAC 6 Clicks Score 18  -AM 20  -MP 20  -MP    Functional Assessment    Outcome Measure Options AM-PAC 6 Clicks Basic Mobility (PT)  -AM AM-PAC 6 Clicks Basic Mobility (PT)  -MP AM-PAC 6 Clicks Basic Mobility (PT)  -MP      User Key  (r) = Recorded By, (t) = Taken By, (c) = Cosigned By    Initials Name Provider Type    AM Denver Murcia PTA Physical Therapy Assistant    MP Tung Liu, PT Physical Therapist           Time Calculation:         PT Charges       03/08/17 0920          Time Calculation    Start Time 0920  -AM      Stop Time 1000  -AM      Time Calculation (min) 40 min  -AM      PT Received On 03/08/17  -AM      PT - Next Appointment 03/08/17  -AM      Time Calculation- PT    Total Timed Code Minutes- PT 40 minute(s)  -AM        User Key  (r) = Recorded By, (t) = Taken By, (c) = Cosigned By    Initials Name Provider Type    AM Denver Murcia PTA Physical Therapy Assistant          Therapy Charges for Today     Code Description Service Date Service Provider Modifiers Qty    66935656103 HC GAIT TRAINING EA 15 MIN 3/8/2017 Denver Murcia PTA GP 1    20484520996 HC PT THER PROC GROUP 3/8/2017 Denver Murcia PTA GP 2          PT G-Codes  PT Professional Judgement Used?: Yes  Outcome Measure Options: AM-PAC 6 Clicks Basic Mobility (PT)  Score: 20  Functional Limitation: Mobility: Walking and moving around  Mobility: Walking and Moving Around Current Status (): At least 20 percent but less than 40 percent impaired, limited or restricted  Mobility: Walking and Moving Around Goal Status (): At least 1 percent but less than 20 percent impaired, limited or restricted    Denver Murcia PTA  3/8/2017

## 2017-03-08 NOTE — PLAN OF CARE
Problem: Patient Care Overview (Adult)  Goal: Plan of Care Review  Outcome: Ongoing (interventions implemented as appropriate)    03/08/17 0430   Coping/Psychosocial Response Interventions   Plan Of Care Reviewed With patient   Patient Care Overview   Progress improving   Outcome Evaluation   Outcome Summary/Follow up Plan no pain meds needed        Goal: Adult Individualization and Mutuality  Outcome: Ongoing (interventions implemented as appropriate)  Goal: Discharge Needs Assessment  Outcome: Ongoing (interventions implemented as appropriate)    Problem: Cellulitis (Adult)  Goal: Signs and Symptoms of Listed Potential Problems Will be Absent or Manageable (Cellulitis)  Outcome: Ongoing (interventions implemented as appropriate)    Problem: Amputation, Lower Extremity (Adult)  Goal: Signs and Symptoms of Listed Potential Problems Will be Absent or Manageable (Amputation, Lower Extremity)  Outcome: Ongoing (interventions implemented as appropriate)

## 2017-03-08 NOTE — PROGRESS NOTES
LOS: 6 days   Patient Care Team:  Jamaal Bravo MD as PCP - General    Chief Complaint: right foot infection      Subjective   Patient seen at bedside resting comfortably. NAD. Denies n/v/f/c/sob/cp. He has no pain. PT at bedside.   History taken from: patient    Objective     Vital Signs  Temp:  [96.4 °F (35.8 °C)-98.9 °F (37.2 °C)] 96.4 °F (35.8 °C)  Heart Rate:  [72-93] 89  Resp:  [18] 18  BP: (106-113)/(57-70) 108/69    Objective     Constitutional: well developed, well nourished    HEENT: Normocephalic and atraumatic, normal hearing    Respiratory: Non labored respirations noted    RLE: posterior splint is c/d/i. No pain. Negative homans.        Results Review:     I reviewed the patient's new clinical results.      Assessment/Plan     Active Problems:    Foot osteomyelitis, right    Nodule of groin    Type 2 diabetes mellitus with skin complication    Status post transmetatarsal amputation of right foot      VSS, afebrile  No leukocytosis  S/p I&D right foot by Dr. Rosenthal on 3/2, Gastrocrecession and TMA on 3/5/17.  No further surgery planned on this admission  NWB to RLE with knee scooter  Keep splint c/d/i. Do not remove or get wet  OK to dc antibiotics  OK to dc home Thursday  Pt to follow up Monday 3/13 in Podiatry clinic  Please do not hesitate to call with questions          This document has been electronically signed by Marco A Thompson DPM on March 8, 2017 4:47 PM

## 2017-03-08 NOTE — PROGRESS NOTES
"   LOS: 6 days   Patient Care Team:  Jamaal Bravo MD as PCP - General    Chief Complaint: \"Feeling better; ready to go home tomorrow.\"    Subjective     History of Present Illness      Subjective:  Symptoms:  Stable.  No shortness of breath, malaise, cough, chest pain, weakness, headache, chest pressure, anorexia, diarrhea or anxiety.    Diet:  Adequate intake.  No nausea or vomiting.    Activity level: Activity impairment: Impaired/recent Right foot surgery.    Pain:  He complains of pain that is mild.  He reports pain is improving.  Pain is well controlled.        Patient states doing good and hopes to go home tomorrow.  Awaiting word/\"OK\" from Dr. Thompson.  CM has been to see patient and will plan on sending home with Rx for lift chair and knee-scooter/4-wheeled type walker to assist with his gait.   PT/OT will be ordered.  Patient denies HA, CP, palpitations, SOA/wheezing, n/v, diarrhea/constipation. A 10 point ROS was completed.  Other than the above pertinent positives, all systems negative.  History taken from: patient & chart.    Objective     Vital Signs  Temp:  [96.5 °F (35.8 °C)-98.6 °F (37 °C)] 98.6 °F (37 °C)  Heart Rate:  [72-93] 86  Resp:  [18] 18  BP: (108-121)/(59-70) 108/59    Objective:  General Appearance:  Comfortable and in no acute distress.    Vital signs: (most recent): Blood pressure 108/59, pulse 86, temperature 98.6 °F (37 °C), temperature source Oral, resp. rate 18, height 76\" (193 cm), weight 274 lb (124 kg), SpO2 92 %.  Vital signs are normal.  No fever.    Output: Producing urine and no stool output.    HEENT: Normal HEENT exam.    Lungs:  Normal respiratory rate and normal effort.  He is not in respiratory distress.  Breath sounds clear to auscultation.  No stridor.  No wheezes or rhonchi.    Heart: Normal rate.  Regular rhythm.  S1 normal and S2 normal.    Chest: No chest wall tenderness.  Symmetric chest wall expansion.   Abdomen: Abdomen is soft and non-distended.  There are " no signs of ascites.  Bowel sounds are normal.   There is no epigastric area or no suprapubic area tenderness.  There is no rebound tenderness.  There is no guarding.   There is no mass. There is no splenomegaly. There is no hepatomegaly.   Extremities: Normal range of motion.  There is no local swelling or dependent edema.  (Right foot wrapped/elevated after surgery; C/D/I)  Pulses: Distal pulses are intact.    Neurological: Patient is alert and oriented to person, place and time.    Pupils:  Pupils are equal, round, and reactive to light.    Skin:  Warm and dry.  No rash.             Results Review:     I reviewed the patient's new clinical results.     Lab Results (last 24 hours)     Procedure Component Value Units Date/Time    POC Glucose Fingerstick [[id number]]  (Normal) Collected:  03/04/17 0416    Specimen:  Blood Updated:  03/07/17 0601     Glucose 77 mg/dL       Meter: [id number]: [id number] SISI KOBE       POC Glucose Fingerstick [[id number]]  (Abnormal) Collected:  03/06/17 2016    Specimen:  Blood Updated:  03/07/17 0608     Glucose 138 (H) mg/dL       RN NotifiedMeter: [id number]: [id number] GRACIELA DILL       CBC & Differential [[id number]] Collected:  03/07/17 0702    Specimen:  Blood Updated:  03/07/17 0721    Narrative:       The following orders were created for panel order CBC & Differential.  Procedure                               Abnormality         Status                     ---------                               -----------         ------                     CBC Auto Differential[[id number]]         Abnormal            Final result                 Please view results for these tests on the individual orders.    CBC Auto Differential [[id number]]  (Abnormal) Collected:  03/07/17 0702    Specimen:  Blood Updated:  03/07/17 0721     WBC 7.90 10*3/mm3      RBC 4.25 (L) 10*6/mm3      Hemoglobin 12.3 (L) g/dL      Hematocrit 36.2 (L) %      MCV 85.2 fL      MCH 28.9 pg      MCHC  34.0 g/dL      RDW 13.6 %      RDW-SD 42.5 fl      MPV 10.2 fL      Platelets 159 10*3/mm3      Neutrophil % 61.6 %      Lymphocyte % 19.4 %      Monocyte % 16.1 (H) %      Eosinophil % 2.2 %      Basophil % 0.4 %      Immature Grans % 0.3 %      Neutrophils, Absolute 4.88 10*3/mm3      Lymphocytes, Absolute 1.53 10*3/mm3      Monocytes, Absolute 1.27 (H) 10*3/mm3      Eosinophils, Absolute 0.17 10*3/mm3      Basophils, Absolute 0.03 10*3/mm3      Immature Grans, Absolute 0.02 10*3/mm3     Comprehensive Metabolic Panel [[id number]]  (Abnormal) Collected:  03/07/17 0702    Specimen:  Blood Updated:  03/07/17 0725     Glucose 110 (H) mg/dL      BUN 20 mg/dL      Creatinine 0.91 mg/dL      Sodium 137 mmol/L      Potassium 4.3 mmol/L      Chloride 100 mmol/L      CO2 29.0 mmol/L      Calcium 8.9 mg/dL      Total Protein 7.4 g/dL      Albumin 3.80 g/dL      ALT (SGPT) 36 U/L      AST (SGOT) 30 U/L      Alkaline Phosphatase 55 U/L      Total Bilirubin 0.9 mg/dL      eGFR Non African Amer 87 mL/min/1.73      Globulin 3.6 (H) gm/dL      A/G Ratio 1.1 g/dL      BUN/Creatinine Ratio 22.0      Anion Gap 8.0 mmol/L     POC Glucose Fingerstick [[id number]]  (Normal) Collected:  03/07/17 0646    Specimen:  Blood Updated:  03/07/17 1132     Glucose 107 mg/dL       Meter: [id number]: [id number] [facility]       POC Glucose Fingerstick [[id number]]  (Normal) Collected:  03/06/17 1632    Specimen:  Blood Updated:  03/07/17 1134     Glucose 111 mg/dL       RN NotifiedMeter: [id number]: [id number] MAXIMINO PACHECO       POC Glucose Fingerstick [[id number]]  (Normal) Collected:  03/07/17 1104    Specimen:  Blood Updated:  03/07/17 1135     Glucose 125 mg/dL       RN NotifiedSliding Scale AdminMeter: [id number]: [id number] MAXIMINO FISCHER       Tissue Exam [[id number]] Collected:  03/05/17 0907    Specimen:  Tissue from Toe, Right Updated:  03/07/17 1245     Case Report --      Surgical Pathology Report                         " Case: [id number]                                  Authorizing Provider:  Marco A Thompson DPM        Collected:           03/05/2017 09:47 AM          Ordering Location:     [facility]             Received:            03/06/2017 10:53 AM                                 [facility]                                                              Pathologist:           Stef Zelaya MD                                                          Specimen:    Toe, Right, transmetatarsal resection                                                       Final Diagnosis --      TRANSMETATARSAL AMPUTATION, RIGHT FOOT:      CHRONIC OSTEOMYELITIS OF ONE SMALLER METATARSAL HEAD.      ACUTE ULCER IN RELATION TO 1ST METATARSAL HEAD.      OLD AMPUTATION OF 2ND TOE, 4TH TOE AND 5TH TOE.      MARKED HALLUX VALGUS.         Intradepartmental Consult --      Dr. Moore reviewed the histologic slides and concurs with the above diagnoses.       Gross Description --         The container is labeled \"toes of right foot\" and has a transmetatarsal amputation of the right foot which includes the great toe and the 3rd toe.  The 2nd, 4th and 5th toes are not identified due to old amputations.  The specimen also includes the metatarsal heads of the 5 metatarsal bones, bits of tendon, 2 segments of skin, bits of bone and fragments of soft tissue.  The soft tissue measures 5.0 x 4.0 x 3.0 cm.  The segments of skin separately measure 8.0 x 2.0 x 1.8 cm and 5.0 x 1.5 x 1.5 cm.  The transmetatarsal amputation specimen including the 2 phalanges mentioned measures 10.5 x 9.0 x 3.5 cm.  The digital nails of the great toe and 3rd toe are thickened.  The skin appears smooth and white everywhere except over the metatarsal head of the 1st toe where there is a medial ulcer measuring 1.0 x 0.6 cm wide.  The great toe has marked hallux valgus.  Representative sections are embedded.  1A ulcer of medial foot; 1B and 1C 2 of the smaller metatarsal heads (2) " decalcified; 1D 1st metatarsal head decalcified.       Embedded Images --     Blood Culture [[id number]]  (Normal) Collected:  03/02/17 1302    Specimen:  Blood from Arm, Left Updated:  03/07/17 1401     Blood Culture No growth at 5 days     Blood Culture [[id number]]  (Normal) Collected:  03/02/17 1305    Specimen:  Blood from Arm, Right Updated:  03/07/17 1401     Blood Culture No growth at 5 days     POC Glucose Fingerstick [[id number]]  (Normal) Collected:  03/07/17 1644    Specimen:  Blood Updated:  03/07/17 1737     Glucose 103 mg/dL       RN NotifiedMeter: [id number]: [id number] CHUCK HARRIS             Medication Review:     Current Facility-Administered Medications:   •  acetaminophen (TYLENOL) tablet 650 mg, 650 mg, Oral, Q4H PRN, Jamaal Bravo MD, 650 mg at 03/07/17 1750  •  bacitracin injection, , , PRN, Marco A Thompson DPM, 50,000 Units at 03/05/17 0839  •  bupivacaine (PF) (MARCAINE) 0.5 % injection, , , PRN, Tung Rosenthal DPM, 30 mL at 03/05/17 0839  •  dronedarone (MULTAQ) tablet 400 mg, 400 mg, Oral, Nightly, Jamaal Bravo MD, 400 mg at 03/07/17 2046  •  enoxaparin (LOVENOX) syringe 40 mg, 40 mg, Subcutaneous, Daily, Jamaal Bravo MD, 40 mg at 03/07/17 0853  •  fluticasone (FLONASE) 50 MCG/ACT nasal spray 1 spray, 1 spray, Each Nare, BID, Jamaal Bravo MD, 1 spray at 03/07/17 0854  •  gabapentin (NEURONTIN) capsule 300 mg, 300 mg, Oral, Q8H, Jamaal Bravo MD, 300 mg at 03/07/17 2206  •  HYDROcodone-acetaminophen (NORCO) 7.5-325 MG per tablet 1 tablet, 1 tablet, Oral, Q6H PRN, Tung Rosenthal DPM, 1 tablet at 03/05/17 2058  •  insulin aspart (novoLOG) injection 0-9 Units, 0-9 Units, Subcutaneous, 4x Daily AC & at Bedtime, Jamaal Bravo MD, 0 Units at 03/02/17 1834  •  insulin detemir (LEVEMIR) injection 10 Units, 10 Units, Subcutaneous, Nightly, Jamaal Bravo MD, 10 Units at 03/03/17 2115  •  magnesium oxide (MAGOX) tablet 400 mg, 400 mg, Oral, Daily, Jamaal  MARY Bravo MD, 400 mg at 03/07/17 0854  •  metoprolol tartrate (LOPRESSOR) half tablet 12.5 mg, 12.5 mg, Oral, BID, Jamaal Bravo MD, 12.5 mg at 03/04/17 1725  •  Morphine sulfate (PF) injection 2 mg, 2 mg, Intravenous, Q2H PRN, Jamaal Bravo MD  •  pantoprazole (PROTONIX) EC tablet 40 mg, 40 mg, Oral, Q AM, Jamaal Bravo MD, 40 mg at 03/07/17 0612  •  Pharmacy to dose vancomycin, , Does not apply, Continuous PRN, Jamaal Bravo MD  •  sodium chloride (NS) irrigation solution, , , PRN, Marco A Thompson DPM, 2,000 mL at 03/05/17 0841  •  sodium chloride 0.9 % flush 1-10 mL, 1-10 mL, Intravenous, PRN, Jamaal Bravo MD, 10 mL at 03/02/17 1835  •  temazepam (RESTORIL) capsule 15 mg, 15 mg, Oral, Nightly PRN, Jamaal Bravo MD, 15 mg at 03/06/17 2226  •  valsartan (DIOVAN) tablet 40 mg, 40 mg, Oral, Nightly, Jamaal Bravo MD, 40 mg at 03/05/17 2056  •  vancomycin (VANCOCIN) 2,250 mg in sodium chloride 0.9 % 500 mL IVPB, 2,250 mg, Intravenous, Q12H, Jamaal Bravo MD, Last Rate: 0 mL/hr at 03/07/17 0541, 2,250 mg at 03/08/17 0317      Assessment/Plan     Active Problems:    Foot osteomyelitis, right    Nodule of groin    Type 2 diabetes mellitus with skin complication    Status post transmetatarsal amputation of right foot      Assessment & Plan     1. POD #3 Right foot TMA (I&D was on 3/2):  Patient continues to do well; pain controlled.  PT started 3/6/17.  Will need lift chair and 4-wheeled knee scooter with  PT/OT at time of d/c home.  Dr. Thompson coordinating his POC.  Plan will be d/c home in AM if agreed upon by Dr. Thompson.    2. T2 DM:  Controlled with SSI with proper ADA 2000 Lei diet.  Patient has NOT been requiring Levemir basal insulin.  Resume home/oral DM meds at d/c.     3. Right groin LNs:  No change.  F/u as outpatient.     Jamaal Bravo MD  03/08/17  5:02 AM    Time: 15 min

## 2017-03-08 NOTE — PROGRESS NOTES
LOS: 6 days   Patient Care Team:  Jamaal Bravo MD as PCP - General    Chief Complaint: right foot infection      Subjective   Patient seen at bedside resting comfortably. NAD. Denies n/v/f/c/sob/cp. He has no pain. Has been up with PT using scooter    History taken from: patient    Objective     Vital Signs  Temp:  [96.4 °F (35.8 °C)-98.9 °F (37.2 °C)] 96.4 °F (35.8 °C)  Heart Rate:  [72-93] 89  Resp:  [18] 18  BP: (106-113)/(57-70) 108/69    Objective     Constitutional: well developed, well nourished    HEENT: Normocephalic and atraumatic, normal hearing    Respiratory: Non labored respirations noted    RLE: posterior splint is c/d/i. Drain with 10mL of serosanguinous fluid. No pain. Negative homans.        Results Review:     I reviewed the patient's new clinical results.      Assessment/Plan     Active Problems:    Foot osteomyelitis, right    Nodule of groin    Type 2 diabetes mellitus with skin complication    Status post transmetatarsal amputation of right foot      VSS, afebrile  No leukocytosis  S/p I&D right foot by Dr. Rosenthal on 3/2, Gastrocrecession and TMA on 3/5/17.  No further surgery planned on this admission  Drain pulled  NWB to RLE with knee scooter  Continue antibiotics  Please do not hesitate to call with questions          This document has been electronically signed by Marco A Thompson DPM on March 8, 2017 4:45 PM

## 2017-03-08 NOTE — PROGRESS NOTES
Acute Care - Physical Therapy Treatment Note  [facility]     Patient Name: Emre Lisa  : 1960  MRN: [id number]  Today's Date: 3/8/2017     Date of Referral to PT: 17  Referring Physician: Dr. Bravo    Admit Date: 3/2/2017    Visit Dx:    ICD-10-CM ICD-9-CM   1. Acute osteomyelitis of right foot M86.171 730.07   2. Impaired physical mobility Z74.09 781.99     Patient Active Problem List   Diagnosis   • Foot osteomyelitis, right   • Nodule of groin   • Type 2 diabetes mellitus with skin complication   • Status post transmetatarsal amputation of right foot               Adult Rehabilitation Note       17 1305 17 0920       Rehab Assessment/Intervention    Discipline physical therapy assistant  -AM physical therapy assistant  -AM     Document Type therapy note (daily note)  -AM therapy note (daily note)  -AM     Subjective Information agree to therapy;no complaints  -AM agree to therapy;complains of;pain  -AM     Patient Effort, Rehab Treatment good  -AM good  -AM     Symptoms Noted During/After Treatment none  -AM none  -AM     Precautions/Limitations fall precautions;non-weight bearing status  -AM fall precautions;non-weight bearing status  -AM     Equipment Issued to Patient gait belt  -AM gait belt  -AM     Recorded by [AM] Denver Murcia PTA [AM] Denver Murcia PTA     Vital Signs    Pre Systolic BP Rehab 108  -  -AM     Pre Treatment Diastolic BP 69  -AM 57  -AM     Post Systolic BP Rehab  128  -AM     Post Treatment Diastolic BP  76  -AM     Pretreatment Heart Rate (beats/min) 81  -AM 77  -AM     Posttreatment Heart Rate (beats/min)  88  -AM     Pre SpO2 (%) 99  -AM 98  -AM     O2 Delivery Pre Treatment room air  -AM room air  -AM     Post SpO2 (%)  98  -AM     O2 Delivery Post Treatment  room air  -AM     Pre Patient Position Sitting  -AM Supine  -AM     Intra Patient Position Standing  -AM Standing  -AM     Post Patient Position Sitting  -AM Sitting  -AM      Recorded by [AM] Denver Murcia PTA [AM] Denver Murcia PTA     Pain Assessment    Pain Assessment No/denies pain  -AM 0-10  -AM     Pain Score  3  -AM     Post Pain Score  3  -AM     Pain Type  Acute pain  -AM     Pain Location  Neck  -AM     Pain Orientation  Mid  -AM     Pain Descriptors  Sore  -AM     Pain Frequency  Constant/continuous  -AM     Date Pain First Started  03/02/17  -AM     Clinical Progression  Gradually improving  -AM     Patient's Stated Pain Goal  No pain  -AM     Pain Intervention(s)  Ambulation/increased activity  -AM     Result of Injury  No  -AM     Work-Related Injury  No  -AM     Multiple Pain Sites  No  -AM     Recorded by [AM] Denver Murcia PTA [AM] Denver Murcia PTA     Cognitive Assessment/Intervention    Current Cognitive/Communication Assessment functional  -AM functional  -AM     Orientation Status oriented x 4  -AM oriented x 4  -AM     Follows Commands/Answers Questions 100% of the time  -% of the time  -AM     Personal Safety decreased awareness, need for safety  -AM decreased awareness, need for safety  -AM     Personal Safety Interventions gait belt;nonskid shoes/slippers when out of bed;supervised activity  -AM gait belt;nonskid shoes/slippers when out of bed;supervised activity  -AM     Recorded by [AM] Denevr Murcia PTA [AM] Denver Murcia PTA     ROM (Range of Motion)    General ROM lower extremity range of motion deficits identified  -AM lower extremity range of motion deficits identified  -AM     Recorded by [AM] Denver Murcia PTA [AM] Denver Murcia PTA     General LE Assessment    ROM ankle, right: LE ROM deficit  -AM ankle, right: LE ROM deficit  -AM     ROM Detail R ankle in cast  -AM R ankle in cast  -AM     Recorded by [AM] Denver Murcia PTA [AM] Denver Murcia PTA     Mobility Assessment/Training    Extremity Weight-Bearing Status right lower extremity  -AM right lower extremity  -AM     Right Lower Extremity Weight-Bearing non  weight-bearing  -AM non weight-bearing  -AM     Recorded by [AM] Denver Murcia, PTA [AM] Denver Murcia PTA     Bed Mobility, Assessment/Treatment    Bed Mobility, Roll Left, [place] not tested  -AM independent  -AM     Bed Mobility, Roll Right, [place] not tested  -AM independent  -AM     Bed Mobility, Scoot/Bridge, [place] not tested  -AM independent  -AM     Bed Mob, Supine to Sit, [place] not tested  -AM independent  -AM     Bed Mob, Sit to Supine, [place] not tested  -AM independent  -AM     Bed Mob, Sidelying to Sit, [place] not tested  -AM not tested  -AM     Bed Mob, Sit to Sidelying, [place] not tested  -AM not tested  -AM     Recorded by [AM] Denver Murcia, PTA [AM] Denver Murcia PTA     Transfer Assessment/Treatment    Transfers, Bed-Chair [place] stand by assist;contact guard assist  -AM contact guard assist  -AM     Transfers, Chair-Bed [place] stand by assist;contact guard assist  -AM contact guard assist  -AM     Transfers, Bed-Chair-Bed, Assist Device other (see comments)   knee scooter  -AM other (see comments)   knee scooter  -AM     Transfers, Sit-Stand [place] stand by assist;contact guard assist  -AM contact guard assist  -AM     Transfers, Stand-Sit [place] stand by assist;contact guard assist  -AM contact guard assist  -AM     Transfers, Sit-Stand-Sit, Assist Device other (see comments)   knee scooter  -AM other (see comments)   knee scooter  -AM     Toilet Transfer, [place] supervision required;contact guard assist  -AM not tested  -AM     Toilet Transfer, Assistive Device other (see comments)   knee scooter  -AM      Walk-In Shower Transfer, [place] not tested  -AM not tested  -AM     Bathtub Transfer, [place] not tested  -AM not tested  -AM     Transfer, Maintain Weight Bearing Status able to maintain weight bearing status  -AM able to maintain weight bearing status  -AM     Transfer, Safety Issues step length  decreased  -AM step length decreased  -AM     Transfer, Impairments ROM decreased;strength decreased;impaired balance  -AM ROM decreased;strength decreased;impaired balance;pain  -AM     Recorded by [AM] Denver Murcia PTA [AM] Denver Murcia PTA     Gait Assessment/Treatment    Gait, [place] Level stand by assist;contact guard assist  -AM contact guard assist  -AM     Gait, Assistive Device other (see comments)   knee scooter  -AM other (see comments)   knee scooter  -AM     Gait, Distance (Feet) 200  -  -AM     Gait, Gait Pattern Analysis 2-point gait  -AM 2-point gait  -AM     Gait, Gait Deviations bilateral:;[place] decreased  -AM bilateral:;[place] decreased  -AM     Gait, Maintain Weight Bearing Status able to maintain weight bearing status  -AM able to maintain weight bearing status  -AM     Gait, Safety Issues step length decreased  -AM step length decreased  -AM     Gait, Impairments ROM decreased;strength decreased;impaired balance  -AM ROM decreased;strength decreased;impaired balance;pain  -AM     Recorded by [AM] Denver Murcia PTA [AM] Denver Murcia PTA     Stairs Assessment/Treatment    Stairs, [place] Level not tested  -AM not tested  -AM     Recorded by [AM] Denver Murcia PTA [AM] Denver Murcia PTA     Therapy Exercises    Bilateral Lower Extremities  AROM:;20 reps;supine;sitting;ankle pumps/circles;glut sets;heel slides;LAQ;quad sets;SLR   toe wiggle  -AM     Recorded by  [AM] Denver Murcia PTA     Positioning and Restraints    Pre-Treatment Position sitting in chair/recliner  -AM in bed  -AM     Post Treatment Position bsc  -AM chair  -AM     In Chair  reclined;call light within reach;encouraged to call for assist;legs elevated  -AM     On BS commode notified nsg;sitting;call light within reach;encouraged to call for assist  -AM      Recorded by [AM] Denver Murcia PTA [AM] Denver Murcia PTA       User Key  (r) = Recorded By, (t) = Taken By, (c) = Cosigned By     Initials Name Effective Dates    AM Denver Murcia, PTA 10/17/16 -                 IP PT Goals       03/08/17 1305 03/08/17 0920 03/07/17 1713    Transfer Training PT LTG    Transfer Training PT  LTG, Date Goal Reviewed 03/08/17  -AM 03/08/17  -AM 03/07/17  -MP    Transfer Training PT LTG, Outcome goal not met  -AM goal not met  -AM goal ongoing  -MP    Gait Training PT LTG    Gait Training Goal PT LTG, Date Goal Reviewed 03/08/17  -AM 03/08/17  -AM 03/07/17  -MP    Gait Training Goal PT LTG, Outcome goal not met  -AM goal not met  -AM goal ongoing  -MP    Stair Training PT LTG    Stair Training Goal PT LTG, Date Goal Reviewed 03/08/17  -AM 03/08/17  -AM 03/07/17  -MP    Stair Training Goal PT LTG, Outcome goal not met  -AM goal not met  -AM goal ongoing  -MP      03/06/17 1129          Transfer Training PT LTG    Transfer Training PT LTG, Date Established 03/06/17  -MP      Transfer Training PT LTG, Time to Achieve by discharge  -MP      Transfer Training PT LTG, Activity Type all transfers  -MP      Transfer Training PT LTG, [place] Level conditional independence  -MP      Transfer Training PT LTG, Assist Device walker, rolling  -MP      Gait Training PT LTG    Gait Training Goal PT LTG, Date Established 03/06/17  -MP      Gait Training Goal PT LTG, Time to Achieve by discharge  -MP      Gait Training Goal PT LTG, [place] Level conditional independence  -MP      Gait Training Goal PT LTG, Assist Device walker, rolling   knee sling  -MP      Gait Training Goal PT LTG, Distance to Achieve 50  -MP      Stair Training PT LTG    Stair Training Goal PT LTG, Date Established 03/06/17  -MP      Stair Training Goal PT LTG, Time to Achieve by discharge  -MP      Stair Training Goal PT LTG, Number of Steps 4  -MP      Stair Training Goal PT LTG, [place] Level conditional independence  -MP      Stair Training Goal PT LTG, Assist Device walker, rolling  -MP        User Key  (r) = Recorded By, (t) = Taken By,  (c) = Cosigned By    Initials Name Provider Type    AM Denver Murcia PTA Physical Therapy Assistant     Tung Liu, PT Physical Therapist          Physical Therapy Education     Title: PT OT SLP Therapies (Active)     Topic: Physical Therapy (Active)     Point: Mobility training (Done)    Learning Progress Summary    Learner Readiness Method Response Comment Documented by Status   Patient Acceptance E VU   03/07/17 1717 Done    Acceptance E [facility] 03/06/17 1132 Done               Point: Body mechanics (Done)    Learning Progress Summary    Learner Readiness Method Response Comment Documented by Status   Patient Acceptance E VU   03/07/17 1717 Done    Acceptance E [facility] 03/06/17 1132 Done               Point: Precautions (Done)    Learning Progress Summary    Learner Readiness Method Response Comment Documented by Status   Patient Acceptance E [facility] 03/07/17 1717 Done    Acceptance E [facility] 03/06/17 1132 Done                      User Key     Initials Effective Dates Name Provider Type Discipline     10/17/16 -  Tung Liu, PT Physical Therapist PT                    PT Recommendation and Plan  Anticipated Equipment Needs At Discharge: front wheeled walker, knee sling, rolling knee walker  Anticipated Discharge Disposition: inpatient rehabilitation facility  Planned Therapy Interventions: balance training, gait training, stair training, transfer training  PT Frequency: 5 times/wk  Plan of Care Review  Plan Of Care Reviewed With: patient  Progress: progress toward functional goals as expected  Outcome Summary/Follow up Plan: No PT goals met this tx. pt tolerated gt well (200 ft) w/knee scooter SBA/CGA. Pt would benefit from  PT once discharged from facility.           Outcome Measures       03/08/17 1305 03/08/17 0920 03/07/17 1700    How much help from another person do you currently need...    Turning from your back to your side while in flat bed without using bedrails? 4  -AM 4  -AM 4   -MP    Moving from lying on back to sitting on the side of a flat bed without bedrails? 4  -AM 4  -AM 4  -MP    Moving to and from a bed to a chair (including a wheelchair)? 3  -AM 3  -AM 3  -MP    Standing up from a chair using your arms (e.g., wheelchair, bedside chair)? 3  -AM 3  -AM 4  -MP    Climbing 3-5 steps with a railing? 1  -AM 1  -AM 2  -MP    To walk in hospital room? 3  -AM 3  -AM 3  -MP    AM-PAC 6 Clicks Score 18  -AM 18  -AM 20  -MP    Functional Assessment    Outcome Measure Options AM-PAC 6 Clicks Basic Mobility (PT)  -AM AM-PAC 6 Clicks Basic Mobility (PT)  -AM AM-PAC 6 Clicks Basic Mobility (PT)  -MP      03/06/17 1100          How much help from another person do you currently need...    Turning from your back to your side while in flat bed without using bedrails? 4  -MP      Moving from lying on back to sitting on the side of a flat bed without bedrails? 4  -MP      Moving to and from a bed to a chair (including a wheelchair)? 3  -MP      Standing up from a chair using your arms (e.g., wheelchair, bedside chair)? 4  -MP      Climbing 3-5 steps with a railing? 2  -MP      To walk in hospital room? 3  -MP      AM-PAC 6 Clicks Score 20  -MP      Functional Assessment    Outcome Measure Options AM-PAC 6 Clicks Basic Mobility (PT)  -MP        User Key  (r) = Recorded By, (t) = Taken By, (c) = Cosigned By    Initials Name Provider Type    AM Denver Murcia PTA Physical Therapy Assistant    PERNELL Liu, PT Physical Therapist           Time Calculation:         PT Charges       03/08/17 1305 03/08/17 0920       Time Calculation    Start Time 1305  -AM 0920  -AM     Stop Time 1345  -AM 1000  -AM     Time Calculation (min) 40 min  -AM 40 min  -AM     PT Received On 03/08/17  -AM 03/08/17  -AM     PT - Next Appointment 03/09/17  -AM 03/08/17  -AM     Time Calculation- PT    Total Timed Code Minutes- PT 40 minute(s)  -AM 40 minute(s)  -AM       User Key  (r) = Recorded By, (t) = Taken By, (c) =  Cosigned By    Initials Name Provider Type    AM Denver Murcia PTA Physical Therapy Assistant          Therapy Charges for Today     Code Description Service Date Service Provider Modifiers Qty    43057901347 HC GAIT TRAINING EA 15 MIN 3/8/2017 Denver Murcia PTA GP 1    78791888875 HC PT THER PROC GROUP 3/8/2017 Denver Murcia PTA GP 2    30026603844 HC GAIT TRAINING EA 15 MIN 3/8/2017 Denver Murcia PTA GP 1    14580612656 HC PT THER PROC GROUP 3/8/2017 Denver Murcia PTA GP 2          PT G-Codes  PT Professional Judgement Used?: Yes  Outcome Measure Options: AM-PAC 6 Clicks Basic Mobility (PT)  Score: 20  Functional Limitation: Mobility: Walking and moving around  Mobility: Walking and Moving Around Current Status (): At least 20 percent but less than 40 percent impaired, limited or restricted  Mobility: Walking and Moving Around Goal Status (): At least 1 percent but less than 20 percent impaired, limited or restricted    Denver Murcia PTA  3/8/2017

## 2017-03-09 VITALS
HEART RATE: 69 BPM | BODY MASS INDEX: 33.36 KG/M2 | DIASTOLIC BLOOD PRESSURE: 60 MMHG | OXYGEN SATURATION: 97 % | RESPIRATION RATE: 18 BRPM | SYSTOLIC BLOOD PRESSURE: 110 MMHG | WEIGHT: 274 LBS | HEIGHT: 76 IN | TEMPERATURE: 97.2 F

## 2017-03-09 LAB
ALBUMIN SERPL-MCNC: 4.1 G/DL (ref 3.4–4.8)
ALBUMIN/GLOB SERPL: 1.1 G/DL (ref 1.1–1.8)
ALP SERPL-CCNC: 61 U/L (ref 38–126)
ALT SERPL W P-5'-P-CCNC: 25 U/L (ref 21–72)
ANION GAP SERPL CALCULATED.3IONS-SCNC: 13 MMOL/L (ref 5–15)
AST SERPL-CCNC: 23 U/L (ref 17–59)
BASOPHILS # BLD AUTO: 0.02 10*3/MM3 (ref 0–0.2)
BASOPHILS NFR BLD AUTO: 0.3 % (ref 0–2)
BILIRUB SERPL-MCNC: 0.9 MG/DL (ref 0.2–1.3)
BUN BLD-MCNC: 24 MG/DL (ref 7–21)
BUN/CREAT SERPL: 24.5 (ref 7–25)
CALCIUM SPEC-SCNC: 9.1 MG/DL (ref 8.4–10.2)
CHLORIDE SERPL-SCNC: 98 MMOL/L (ref 95–110)
CO2 SERPL-SCNC: 29 MMOL/L (ref 22–31)
CREAT BLD-MCNC: 0.98 MG/DL (ref 0.7–1.3)
DEPRECATED RDW RBC AUTO: 42.4 FL (ref 35.1–43.9)
EOSINOPHIL # BLD AUTO: 0.16 10*3/MM3 (ref 0–0.7)
EOSINOPHIL NFR BLD AUTO: 2.5 % (ref 0–7)
ERYTHROCYTE [DISTWIDTH] IN BLOOD BY AUTOMATED COUNT: 13.5 % (ref 11.5–14.5)
GFR SERPL CREATININE-BSD FRML MDRD: 80 ML/MIN/1.73 (ref 56–130)
GLOBULIN UR ELPH-MCNC: 3.8 GM/DL (ref 2.3–3.5)
GLUCOSE BLD-MCNC: 113 MG/DL (ref 60–100)
GLUCOSE BLDC GLUCOMTR-MCNC: 105 MG/DL (ref 70–130)
GLUCOSE BLDC GLUCOMTR-MCNC: 126 MG/DL (ref 70–130)
HCT VFR BLD AUTO: 37.4 % (ref 39–49)
HGB BLD-MCNC: 12.6 G/DL (ref 13.7–17.3)
IMM GRANULOCYTES # BLD: 0.02 10*3/MM3 (ref 0–0.02)
IMM GRANULOCYTES NFR BLD: 0.3 % (ref 0–0.5)
LYMPHOCYTES # BLD AUTO: 1.33 10*3/MM3 (ref 0.6–4.2)
LYMPHOCYTES NFR BLD AUTO: 20.6 % (ref 10–50)
MCH RBC QN AUTO: 28.8 PG (ref 26.5–34)
MCHC RBC AUTO-ENTMCNC: 33.7 G/DL (ref 31.5–36.3)
MCV RBC AUTO: 85.6 FL (ref 80–98)
MONOCYTES # BLD AUTO: 0.94 10*3/MM3 (ref 0–0.9)
MONOCYTES NFR BLD AUTO: 14.6 % (ref 0–12)
NEUTROPHILS # BLD AUTO: 3.99 10*3/MM3 (ref 2–8.6)
NEUTROPHILS NFR BLD AUTO: 61.7 % (ref 37–80)
PLATELET # BLD AUTO: 158 10*3/MM3 (ref 150–450)
PMV BLD AUTO: 10.1 FL (ref 8–12)
POTASSIUM BLD-SCNC: 4 MMOL/L (ref 3.5–5.1)
PROT SERPL-MCNC: 7.9 G/DL (ref 6.3–8.6)
RBC # BLD AUTO: 4.37 10*6/MM3 (ref 4.37–5.74)
SODIUM BLD-SCNC: 140 MMOL/L (ref 137–145)
WBC NRBC COR # BLD: 6.46 10*3/MM3 (ref 3.2–9.8)

## 2017-03-09 PROCEDURE — 97110 THERAPEUTIC EXERCISES: CPT

## 2017-03-09 PROCEDURE — 82962 GLUCOSE BLOOD TEST: CPT

## 2017-03-09 PROCEDURE — 80053 COMPREHEN METABOLIC PANEL: CPT | Performed by: INTERNAL MEDICINE

## 2017-03-09 PROCEDURE — 97150 GROUP THERAPEUTIC PROCEDURES: CPT

## 2017-03-09 PROCEDURE — 97116 GAIT TRAINING THERAPY: CPT

## 2017-03-09 PROCEDURE — 85025 COMPLETE CBC W/AUTO DIFF WBC: CPT | Performed by: INTERNAL MEDICINE

## 2017-03-09 RX ADMIN — FLUTICASONE PROPIONATE 1 SPRAY: 50 SPRAY, METERED NASAL at 08:18

## 2017-03-09 RX ADMIN — GABAPENTIN 300 MG: 300 CAPSULE ORAL at 05:52

## 2017-03-09 RX ADMIN — ACETAMINOPHEN 650 MG: 325 TABLET ORAL at 08:22

## 2017-03-09 RX ADMIN — PANTOPRAZOLE SODIUM 40 MG: 40 TABLET, DELAYED RELEASE ORAL at 05:52

## 2017-03-09 RX ADMIN — MAGNESIUM OXIDE TAB 400 MG (241.3 MG ELEMENTAL MG) 400 MG: 400 (241.3 MG) TAB at 08:18

## 2017-03-09 NOTE — PLAN OF CARE
Problem: Patient Care Overview (Adult)  Goal: Plan of Care Review  Outcome: Ongoing (interventions implemented as appropriate)    03/09/17 0434   Coping/Psychosocial Response Interventions   Plan Of Care Reviewed With patient   Patient Care Overview   Progress improving   Outcome Evaluation   Outcome Summary/Follow up Plan VS stable and Tolerating pain well       Goal: Adult Individualization and Mutuality  Outcome: Ongoing (interventions implemented as appropriate)  Goal: Discharge Needs Assessment  Outcome: Ongoing (interventions implemented as appropriate)    Problem: Cellulitis (Adult)  Goal: Signs and Symptoms of Listed Potential Problems Will be Absent or Manageable (Cellulitis)  Outcome: Ongoing (interventions implemented as appropriate)    Problem: Amputation, Lower Extremity (Adult)  Goal: Signs and Symptoms of Listed Potential Problems Will be Absent or Manageable (Amputation, Lower Extremity)  Outcome: Ongoing (interventions implemented as appropriate)

## 2017-03-09 NOTE — PLAN OF CARE
Problem: Patient Care Overview (Adult)  Goal: Plan of Care Review  Outcome: Outcome(s) achieved Date Met:  03/09/17 03/09/17 0920   Coping/Psychosocial Response Interventions   Plan Of Care Reviewed With patient   Patient Care Overview   Progress progress toward functional goals as expected   Outcome Evaluation   Outcome Summary/Follow up Plan Pt met 2/3 PT goals prior to d/c. At time of discharge, pt ind w/bed mobility - conditional independence w/all transfers and gait(200 ft). Pt d/c to home w/HH and caregiver.       Goal: Adult Individualization and Mutuality  Outcome: Outcome(s) achieved Date Met:  03/09/17  Goal: Discharge Needs Assessment  Outcome: Outcome(s) achieved Date Met:  03/09/17 03/09/17 0920   Discharge Needs Assessment   Concerns To Be Addressed discharge planning concerns   Readmission Within The Last 30 Days no previous admission in last 30 days   Equipment Needed After Discharge none   Discharge Facility/Level Of Care Needs home with home health   Current Discharge Risk physical impairment   Discharge Disposition home healthcare service   Current Health   Anticipated Changes Related to Illness inability to care for self   Self-Care   Equipment Currently Used at Home none   Living Environment   Transportation Available family or friend will provide         Problem: Inpatient Physical Therapy  Goal: Transfer Training Goal 1 LTG- PT  Outcome: Outcome(s) achieved Date Met:  03/09/17 03/06/17 1129 03/09/17 0920   Transfer Training PT LTG   Transfer Training PT LTG, Date Established 03/06/17 --    Transfer Training PT LTG, Time to Achieve by discharge --    Transfer Training PT LTG, Activity Type all transfers --    Transfer Training PT LTG, [place] Level conditional independence --    Transfer Training PT LTG, Assist Device walker, rolling --    Transfer Training PT LTG, Date Goal Reviewed --  03/09/17   Transfer Training PT LTG, Outcome --  goal met       Goal: Gait Training Goal LTG-  PT  Outcome: Outcome(s) achieved Date Met:  03/09/17 03/06/17 1129 03/09/17 0920   Gait Training PT LTG   Gait Training Goal PT LTG, Date Established 03/06/17 --    Gait Training Goal PT LTG, Time to Achieve by discharge --    Gait Training Goal PT LTG, [place] Level conditional independence --    Gait Training Goal PT LTG, Assist Device walker, rolling  (knee sling) --    Gait Training Goal PT LTG, Distance to Achieve 50 --    Gait Training Goal PT LTG, Date Goal Reviewed --  03/09/17   Gait Training Goal PT LTG, Outcome --  goal met       Goal: Stair Training Goal LTG- PT  Outcome: Unable to achieve outcome(s) by discharge Date Met:  03/09/17 03/06/17 1129 03/09/17 0920   Stair Training PT LTG   Stair Training Goal PT LTG, Date Established 03/06/17 --    Stair Training Goal PT LTG, Time to Achieve by discharge --    Stair Training Goal PT LTG, Number of Steps 4 --    Stair Training Goal PT LTG, [place] Level conditional independence --    Stair Training Goal PT LTG, Assist Device walker, rolling --    Stair Training Goal PT LTG, Date Goal Reviewed --  03/09/17   Stair Training Goal PT LTG, Outcome --  goal not met   Stair Training Goal PT LTG, Reason Goal Not Met --  discharged from facility

## 2017-03-09 NOTE — DISCHARGE PLACEMENT REQUEST
"Amee Lisa (56 y.o. Male)     Date of Birth Social Security Number Address Home Phone MRN    1960  [id number]  [place] [address] [phone] [id number]    Hinduism Marital Status          Oriental orthodox        Admission Date Admission Type Admitting Provider Attending Provider Department, Room/Bed    3/2/17 Urgent Jamaal Bravo MD McGhee, Timothy R, MD [address], 354/1    Discharge Date Discharge Disposition Discharge Destination         Home or Self Care             Attending Provider: Jamaal Bravo MD     Allergies:  Januvia [Sitagliptin], Lipitor [Atorvastatin], Peanut Oil, Peanut-containing Drug Products, Penicillins, Sulfa Antibiotics    Isolation:  None   Infection:  None   Code Status:  FULL    Ht:  76\" (193 cm)   Wt:  274 lb (124 kg)    Admission Cmt:  None   Principal Problem:  Status post transmetatarsal amputation of right foot [Z89.431]                 Active Insurance as of 3/2/2017     Primary Coverage     Payor Plan Insurance Group Employer/Plan Group    MEDICARE MEDICARE A & B      Payor Plan Address Payor Plan Phone Number Effective From Effective To    [address] [phone] 12/1/2012     [place] [address]       Subscriber Name Subscriber Birth Date Member ID       AMEE LISA 1960 [id number]           Secondary Coverage     Payor Plan Insurance Group Employer/Plan Group     BENEFIT PLAN  BENEFIT PLAN [id number]     Payor Plan Address Payor Plan Phone Number Effective From Effective To    [address] [phone] 1/1/2017     [place] [address]       Subscriber Name Subscriber Birth Date Member ID       AMEE LISA 1960 [id number]                 Emergency Contacts      (Rel.) Home Phone Work Phone Mobile Phone    Reymundo Lisa (Spouse) [phone] -- [phone]               History & Physical      Jamaal Bravo MD at 3/2/2017 12:35 PM      " "    Patient Care Team:  Jamaal Bravo MD as PCP - General     Chief complaint: \"Right foot/toe pain.\"     Subjective        History of Present Illness      Emre comes in today to [facility] for worsening right foot pain/swelling/drainage and difficulty walking. He has difficult to control T2 DM and h/o previous DFU/toe amputations. He was to have Right 4th toe amputation by Dr. Marco A Thompson ([facility]) in the next 2 weeks, and has recently been on Doxycycline for cellulitis. He has had multiple problems in the past with his left and right feet. He has had 5th toe (3/2016) and 2nd toe (in 2013) both by former [facility] (Dr. Tyler) for DFUs, and most recently has bene followed at [facility] for left DFUs, which are healing and will not require amputation. No problems in the past with any surgery/GETA. Patient can perform light housework (dusing, cleaning), walk 2-3 blocks and 1-2 flights of stairs before becoming SOA, but back to baseline after resting a few minutes. Good overall functional capacity (>4METs) without symptoms. Normal daily DM foot checks; wears indoor house shoes and tries to trim calluses/lotion feet. DFU R>L feet. Patient has DM shoes, but needs DM boots for work -- asking for Rx today. Still using Neurontin 300 mg up to QID for DPN. Needing refill today. He has NOT had fasting labs/Hgb A1c checked in the past 6+ months. His glucometer (one touch) shows avg  mg/dl fasting and <140 mg/dl 2 hr PP. Patient is a non-smoker. No CP, SOB, HA. A 10 point ROS was completed. Other than above pertinent positives, all systems negative.        Review of Systems   Constitutional: Positive for fever.   HENT: Negative.   Eyes: Negative.   Respiratory: Negative.   Cardiovascular: Negative.   Endocrine: Negative.   Genitourinary: Negative.   Musculoskeletal: Negative.   Skin: Positive for color change and wound.   Allergic/Immunologic: Negative.   Neurological: Negative. " "  Hematological: Negative.   Psychiatric/Behavioral: Negative.   All other systems reviewed and are negative.       Patient Active Problem List    Diagnosis    •  CAD (coronary artery disease)    •  Vitamin D deficiency    •  Chronic atrial fibrillation    •  Essential hypertension    •  Mixed hyperlipidemia    •  Allergic rhinitis    •  Diabetic peripheral neuropathy associated with type 2 diabetes mellitus    •  Type II diabetes mellitus, uncontrolled    •  Screening for prostate cancer    •  Screen for colon cancer    •  Erectile dysfunction associated with type 2 diabetes mellitus    •  Bilateral carpal tunnel syndrome    •  Preventative health care    •  Hospital discharge follow-up    •  History of amputation of lesser toe of right foot    •  Personal history of diabetic foot ulcer       Past Surgical History    Past Surgical History    Procedure  Laterality  Date    •  Tonsillectomy and adenoidectomy      •  Cardiac defibrillator placement            Family History    Family History    Problem  Relation  Age of Onset    •  Arthritis  Mother     •  Kidney disease  Mother     •  Arthritis  Father     •  Diabetes  Father     •  Heart disease  Father     •  High cholesterol  Father     •  Hypertension  Father     •  Stroke  Father          History    Substance Use Topics    •  Smoking status:  Never Smoker    •  Smokeless tobacco:  Never Used    •  Alcohol Use:  0.0 oz/week      0 Standard drinks or equivalent per week                  Prescriptions Prior to Admission       (Not in a hospital admission)     Allergies: Januvia [sitagliptin]; Lipitor [atorvastatin]; Peanut-containing drug products; Penicillins; and Sulfa antibiotics     Objective         Vital Signs          Vitals Recorded in This Encounter         3/2/2017 0919              BP: 124/64 mmHg     Pulse: 97     Temp: 98.2 °F (36.8 °C)     Temp src: Temporal     SpO2: 100 %     Weight: 283 lb (128.368 kg)     Height: 6' 3\" (1.905 m)     BMI " (Calculated): 35.4                Physical Exam   Constitutional: He is oriented to person, place, and time. He appears well-developed and well-nourished.   HENT:   Head: Normocephalic and atraumatic.   Right Ear: External ear normal.   Left Ear: External ear normal.   Nose: Nose normal.   Mouth/Throat: Oropharynx is clear and moist. No oropharyngeal exudate.   Eyes: Conjunctivae and EOM are normal. Pupils are equal, round, and reactive to light. Right eye exhibits no discharge. Left eye exhibits no discharge. No scleral icterus.   Neck: Normal range of motion. Neck supple. No tracheal deviation present. No thyromegaly present.   Cardiovascular: Normal rate, regular rhythm and normal heart sounds. Exam reveals no gallop and no friction rub.   No murmur heard.  Pulmonary/Chest: Effort normal and breath sounds normal. No respiratory distress. He has no wheezes. He has no rales. He exhibits no tenderness.   Abdominal: Soft. Bowel sounds are normal. He exhibits no mass. There is no tenderness. There is no rebound and no guarding.   Genitourinary:   Genitourinary Comments: ENDER deferred; no suprapubic/CVA tenderness.    Musculoskeletal: He exhibits edema, tenderness and deformity.   Right foot: Marked erythema and edema lateral aspect forefoot also involving 1st and 3rd toes. Second, 4th and 5th toes absent. Deep ulcer plantar surface in the area of the heads of the 4th and 5th metatarsals. Draining serosanguineous material. Also draining ulcer over 1st MTP. Marked valgus deformity great toe. No ankle or distal leg involvement    Lymphadenopathy:   He has no cervical adenopathy.   Neurological: He is alert and oriented to person, place, and time. He displays normal reflexes. No cranial nerve deficit. Coordination normal.   Skin: Skin is warm and dry. There is erythema.   See above   Nursing note and vitals reviewed.        Results Review:     Right foot x-ray from [facility] 3/2/2017:  3 views the right foot are reviewed.  The patient demonstrates evidence prior 8 dictation of the second, fourth, and fifth digit at the level of the metatarsophalangeal joint. There is marked soft tissue swelling noted in the forefoot laterally. There is   air within the soft tissues and there are erosive changes around the fourth metatarsal head concerning for acute osteomyelitis. The air in the soft tissues is compatible with a gas-forming organism-question gas gangrene.  There are no acute fractures identified. There are osteoarthritic changes in the midfoot. A plantar and dorsal heel spur noted. There is calcification along the plantar fascia.  IMPRESSION:   1. Findings compatible with cellulitis with gas-forming organism/air in the soft tissues raising the question of gas gangrene.  2. Findings compatible with acute osteomyelitis involving the fourth metatarsal head.  3. Evidence prior to dictation of the second, fourth, and fifth digit at the level of metatarsophalangeal joint.  4. Osteoarthritic changes in the midfoot noted.         Assessment/Plan     1. Right foot Gas gangrene/Osteomyelitis/nonhealing DFU/cellulitis -- Admit directly to hospitalConsult Podiatry for amputation of right forefoot vs entire foot -- I did speak to Dr. Rosenthal. Start on IV Vancomycin (NO Zosyn due to PCN allergy) as well as IV morphine for pain medication. Will cover blood sugar with leave him ear and Accu-Cheks as well as hospitalist sliding scale insulin. Proceed according to hospital course.        Assessment & Plan     I discussed the patients findings and my recommendations with patient     Jamaal Bravo MD  03/02/17  11:07 AM     Time: Total admit time 30 min          Routing History       Date/Time From To Method     3/2/2017 11:26 AM MD Jamaal Elaine MD Fax                             Unable to addend/edit note -- patient was NOT to be started on Zosyn due to supposedly having PCN allergy.  Will start on Vanc and order Blood/urine  cultures.     Electronically signed by Jamaal Bravo MD at 3/2/2017 12:48 PM      Tung Rosenthal DPM at 3/2/2017  7:15 PM            H&P reviewed. The patient was examined and there are no changes to the H&P.     Patient to OR for surgical incision and drainage of right foot. Will plan to stage to [facility] at later date. All risks, benefits and potential complications discussed.          This document has been electronically signed by Tung Rosenthal DPM on March 2, 2017 7:16 PM            Electronically signed by Tung Rosenthal DPM at 3/2/2017  7:16 PM      Marco A Thompson DPM at 3/4/2017 11:18 AM             LOS: 2 days   Patient Care Team:  Jamaal Bravo MD as PCP - General    Chief Complaint: right foot infection      History of Present Illness    Subjective   Patient seen at bedside resting comfortably. NAD. Denies n/v/f/c/sob/cp. Mr. Lisa is a patient of mine that was admitted for right foot infection 3/2/17. Was taken by Dr. Rosenthal on 3/2  for I&D as I I was unavailable. I will take over patients care now.     History taken from: patient    Objective     Vital Signs  Temp:  [96 °F (35.6 °C)-99.2 °F (37.3 °C)] 97.7 °F (36.5 °C)  Heart Rate:  [66-74] 74  Resp:  [18] 18  BP: ()/(62-76) 96/63    Objective     Constitutional: well developed, well nourished    HEENT: Normocephalic and atraumatic, normal hearing    Respiratory: Non labored respirations noted    Cardiovascular:    Skin temp is warm to warm from proximal tibia to distal digits  No erythema or edema noted     Musculoskeletal:   Equinus RLE    Dermatological:   Open wound noted to right lateral foot from previous I&D. No drainage, foul odor or periwound erythema noted.     Neurological:   Protective sensation absent  Sensation absent to light touch     Psychiatric: A&O x 3 with normal mood and affect. NAD.       Results Review:     I reviewed the patient's new clinical results.      Assessment/Plan     Active Problems:    Foot  osteomyelitis, right    Nodule of groin    Type 2 diabetes mellitus with skin complication      Assessment & Plan  VSS, afebrile  No leukocytosis  S/p I&D right foot by Dr. Rosenthal on 3/2, foot is currently packed open  NPO after midnight for Tma and gastrocrecession RLE  Please do not hesitate to call with questions          This document has been electronically signed by Marco A Thompson DPM on March 4, 2017 11:23 AM              Electronically signed by Marco A Thompson DPM at 3/4/2017 11:28 AM      Marco A Thompson DPM at 3/5/2017  7:53 AM            H&P reviewed. The patient was examined and there are no changes to the H&P.     Plan is for TMA RLE  With gastrocrecession RLE. Risks and benefits of the procedures were discussed with the patient. No guarantees were given or implied.           This document has been electronically signed by Marco A Thompson DPM on March 5, 2017 7:54 AM            Electronically signed by Marco A Thompson DPM at 3/5/2017  7:54 AM           Physician Progress Notes (last 72 hours) (Notes from 3/6/2017  8:52 AM through 3/9/2017  8:52 AM)      Marco A Thompson DPM at 3/6/2017  6:18 PM  Version 1 of 1            LOS: 4 days   Patient Care Team:  Jamaal Bravo MD as PCP - General    Chief Complaint: right foot infection      Subjective   Patient seen at bedside resting comfortably. NAD. Denies n/v/f/c/sob/cp. He has minimal pain.      History taken from: patient    Objective     Vital Signs  Temp:  [97.5 °F (36.4 °C)-100 °F (37.8 °C)] 98.5 °F (36.9 °C)  Heart Rate:  [75-99] 77  Resp:  [18] 18  BP: ()/(54-76) 117/76    Objective     Constitutional: well developed, well nourished    HEENT: Normocephalic and atraumatic, normal hearing    Respiratory: Non labored respirations noted    RLE: posterior splint is c/d/i. Drain with 10mL of serosanguinous fluid. No pain. Negative homans.        Results Review:     I reviewed the patient's new clinical results.      Assessment&#47;Plan  "    Active Problems:    Foot osteomyelitis, right    Nodule of groin    Type 2 diabetes mellitus with skin complication    Status post transmetatarsal amputation of right foot      VSS, afebrile  No leukocytosis  S/p I&D right foot by Dr. Rosenthal on 3/2, Gastrocrecession and TMA on 3/5/17.  No further surgery planned on this admission  Will pull drain and change dressing tomorrow 3/7/17  NWB to RLE with knee scooter  Continue antibiotics  Please do not hesitate to call with questions          This document has been electronically signed by Marco A Thompson DPM on March 6, 2017 6:18 PM              Electronically signed by Marco A Thompson DPM at 3/6/2017  6:23 PM      Jamaal Bravo MD at 3/7/2017  5:03 AM  Version 1 of 1            LOS: 5 days   Patient Care Team:  Jamaal Bravo MD as PCP - General    Chief Complaint: \"Feeling a little loopy from sleep medicine, but rested better; not able to do much with PT yet.\"    Subjective     History of Present Illness      Subjective:  Symptoms:  Stable.  No shortness of breath, malaise, cough, chest pain, weakness, headache, chest pressure, anorexia, diarrhea or anxiety.    Diet:  Adequate intake.  No nausea or vomiting.    Activity level: Activity impairment: Impaired/recent Right foot surgery.    Pain:  He complains of pain that is mild.  He reports pain is improving.  Pain is well controlled.        Patient states doing \"OK\" after Right TransMetatarsal Amputation of foot by Dr. Thompson (DPM).  He is not sleeping well, some fever/chills reported and states \"room temp is not well controlled.\"  Patient's BS have been good, and he has NOT required any Levemir insulin, but he states they are NOT bringing him an ADA diet every meal (sometimes Regular).  He states his Right foot pain is not bad, but feels muscle ache after surgery.  No PT at this point has been by, he states.  Otherwise doing well.   Patient denies HA, CP, palpitations, SOA/wheezing, n/v, " "diarrhea/constipation. A 10 point ROS was completed.  Other than the above pertinent positives, all systems negative.  History taken from: patient & chart.    Objective     Vital Signs  Temp:  [97.5 °F (36.4 °C)-100 °F (37.8 °C)] 99.8 °F (37.7 °C)  Heart Rate:  [75-99] 80  Resp:  [18] 18  BP: ()/(58-76) 119/72    Objective:  General Appearance:  Comfortable and in no acute distress.    Vital signs: (most recent): Blood pressure 119/72, pulse 80, temperature 99.8 °F (37.7 °C), temperature source Oral, resp. rate 18, height 76\" (193 cm), weight 274 lb (124 kg), SpO2 98 %.  Vital signs are normal.  No fever.    Output: Producing urine and no stool output.    HEENT: Normal HEENT exam.    Lungs:  Normal respiratory rate and normal effort.  He is not in respiratory distress.  Breath sounds clear to auscultation.  No stridor.  No wheezes or rhonchi.    Heart: Normal rate.  Regular rhythm.  S1 normal and S2 normal.    Chest: No chest wall tenderness.  Symmetric chest wall expansion.   Abdomen: Abdomen is soft and non-distended.  There are no signs of ascites.  Bowel sounds are normal.   There is no epigastric area or no suprapubic area tenderness.  There is no rebound tenderness.  There is no guarding.   There is no mass. There is no splenomegaly. There is no hepatomegaly.   Extremities: Normal range of motion.  There is no local swelling or dependent edema.  (Right foot wrapped/elevated after surgery; C/D/I)  Pulses: Distal pulses are intact.    Neurological: Patient is alert and oriented to person, place and time.    Pupils:  Pupils are equal, round, and reactive to light.    Skin:  Warm and dry.  No rash.             Results Review:     I reviewed the patient's new clinical results.     Lab Results (last 24 hours)     Procedure Component Value Units Date/Time    POC Glucose Fingerstick [[id number]]  (Normal) Collected:  03/04/17 1142    Specimen:  Blood Updated:  03/06/17 0804     Glucose 89 mg/dL       Meter: " [id number]: [id number] SHANIQUA LOO       POC Glucose Fingerstick [[id number]]  (Normal) Collected:  03/05/17 2100    Specimen:  Blood Updated:  03/06/17 0805     Glucose 99 mg/dL       Meter: [id number]: [id number] VICKI IVORY       POC Glucose Fingerstick [[id number]]  (Normal) Collected:  03/05/17 0716    Specimen:  Blood Updated:  03/06/17 0810     Glucose 84 mg/dL       Meter: [id number]: [id number] XOCHITL BARAHONA       POC Glucose Fingerstick [[id number]]  (Normal) Collected:  03/05/17 1618    Specimen:  Blood Updated:  03/06/17 0810     Glucose 81 mg/dL       Meter: [id number]: [id number] ASHLEE MARCK       Tissue Exam [[id number]] Collected:  03/05/17 0947    Specimen:  Tissue from Toe, Right Updated:  03/06/17 1053    POC Glucose Fingerstick [[id number]]  (Normal) Collected:  03/06/17 0844    Specimen:  Blood Updated:  03/06/17 1203     Glucose 128 mg/dL       Sliding Scale AdminMeter: [id number]: [id number] MAXIMINO PACHECO       POC Glucose Fingerstick [[id number]]  (Abnormal) Collected:  03/06/17 1147    Specimen:  Blood Updated:  03/06/17 1203     Glucose 133 (H) mg/dL       RN NotifiedMeter: [id number]: [id number] MAXIMINO PACHECO       Blood Culture [[id number]]  (Normal) Collected:  03/02/17 1302    Specimen:  Blood from Arm, Left Updated:  03/06/17 1401     Blood Culture No growth at 4 days     Blood Culture [[id number]]  (Normal) Collected:  03/02/17 1305    Specimen:  Blood from Arm, Right Updated:  03/06/17 1401     Blood Culture No growth at 4 days           Medication Review:     Current Facility-Administered Medications:   •  acetaminophen (TYLENOL) tablet 650 mg, 650 mg, Oral, Q4H PRN, Jamaal Bravo MD, 650 mg at 03/06/17 1627  •  bacitracin injection, , , PRN, Marco A Thompson DPM, 50,000 Units at 03/05/17 0839  •  bupivacaine (PF) (MARCAINE) 0.5 % injection, , , PRN, Tung Rosenthal DPM, 30 mL at 03/05/17 0839  •  dronedarone (MULTAQ) tablet 400  mg, 400 mg, Oral, Nightly, Jamaal Bravo MD, 400 mg at 03/06/17 2225  •  enoxaparin (LOVENOX) syringe 40 mg, 40 mg, Subcutaneous, Daily, Jamaal Bravo MD, 40 mg at 03/06/17 0846  •  fluticasone (FLONASE) 50 MCG/ACT nasal spray 1 spray, 1 spray, Each Nare, BID, Jamaal Bravo MD, 1 spray at 03/06/17 1828  •  gabapentin (NEURONTIN) capsule 300 mg, 300 mg, Oral, Q8H, Jamaal Bravo MD, 300 mg at 03/06/17 2226  •  HYDROcodone-acetaminophen (NORCO) 7.5-325 MG per tablet 1 tablet, 1 tablet, Oral, Q6H PRN, Tung Rosenthal DPM, 1 tablet at 03/05/17 2058  •  insulin aspart (novoLOG) injection 0-9 Units, 0-9 Units, Subcutaneous, 4x Daily AC & at Bedtime, Jamaal Bravo MD, 0 Units at 03/02/17 1834  •  insulin detemir (LEVEMIR) injection 10 Units, 10 Units, Subcutaneous, Nightly, Jamaal Bravo MD, 10 Units at 03/03/17 2115  •  magnesium oxide (MAGOX) tablet 400 mg, 400 mg, Oral, Daily, Jamaal Bravo MD, 400 mg at 03/06/17 0846  •  metoprolol tartrate (LOPRESSOR) half tablet 12.5 mg, 12.5 mg, Oral, BID, Jamaal Bravo MD, 12.5 mg at 03/04/17 1725  •  Morphine sulfate (PF) injection 2 mg, 2 mg, Intravenous, Q2H PRN, Jamaal Bravo MD  •  pantoprazole (PROTONIX) EC tablet 40 mg, 40 mg, Oral, Q AM, Jamaal Bravo MD, 40 mg at 03/06/17 0601  •  Pharmacy to dose vancomycin, , Does not apply, Continuous PRN, Jamaal Bravo MD  •  sodium chloride (NS) irrigation solution, , , PRN, Marco A Thompson DPM, 2,000 mL at 03/05/17 0841  •  sodium chloride 0.9 % flush 1-10 mL, 1-10 mL, Intravenous, PRN, Jamaal Bravo MD, 10 mL at 03/02/17 1835  •  temazepam (RESTORIL) capsule 15 mg, 15 mg, Oral, Nightly PRN, Jamaal Bravo MD, 15 mg at 03/06/17 2226  •  valsartan (DIOVAN) tablet 40 mg, 40 mg, Oral, Nightly, Jamaal Bravo MD, 40 mg at 03/05/17 2056  •  vancomycin (VANCOCIN) 2,250 mg in sodium chloride 0.9 % 500 mL IVPB, 2,250 mg, Intravenous, Q12H, Jamaal Bravo MD, 2,250 mg at 03/07/17  2869      Assessment&#47;Plan     Active Problems:    Foot osteomyelitis, right    Nodule of groin    Type 2 diabetes mellitus with skin complication    Status post transmetatarsal amputation of right foot      Assessment & Plan     POD #2 Right foot TMA (I&D was on 3/2):  Doing well/healing up; pain controlled.  PT ordered/began 3/6/17.  Dr. Thompson coordinating his POC.  CM consult for d/c planning in the next 2-3 days; whenever Dr. Thompson feels patient ready to be safely d/c'd home.  Plan will be to go home with a 4-wheeled/knee walker type scooter and home vs outpatient PT/rehab.    2. T2 DM:  Controlled with SSI/ADA diet for now -- improved/correct ADA 2000 Lei diet.  Resume home/oral DM meds at d/c.     3. Right groin LNs:  No change.  F/u as outpatient.     Jamaal Bravo MD  03/07/17  5:04 AM    Time: 15 min         Electronically signed by Jamaal Bravo MD at 3/7/2017  5:14 AM      Marco A Thompson DPM at 3/7/2017  1:45 PM  Version 1 of 1            LOS: 6 days   Patient Care Team:  Jamaal Bravo MD as PCP - General    Chief Complaint: right foot infection      Subjective   Patient seen at bedside resting comfortably. NAD. Denies n/v/f/c/sob/cp. He has no pain. Has been up with PT using scooter    History taken from: patient    Objective     Vital Signs  Temp:  [96.4 °F (35.8 °C)-98.9 °F (37.2 °C)] 96.4 °F (35.8 °C)  Heart Rate:  [72-93] 89  Resp:  [18] 18  BP: (106-113)/(57-70) 108/69    Objective     Constitutional: well developed, well nourished    HEENT: Normocephalic and atraumatic, normal hearing    Respiratory: Non labored respirations noted    RLE: posterior splint is c/d/i. Drain with 10mL of serosanguinous fluid. No pain. Negative homans.        Results Review:     I reviewed the patient's new clinical results.      Assessment&#47;Plan     Active Problems:    Foot osteomyelitis, right    Nodule of groin    Type 2 diabetes mellitus with skin complication    Status post transmetatarsal  "amputation of right foot      VSS, afebrile  No leukocytosis  S/p I&D right foot by Dr. Rosenthal on 3/2, Gastrocrecession and TMA on 3/5/17.  No further surgery planned on this admissionpulled  NWB to RLE with knee scooterantibiotics  Please do not hesitate to call with questions          This document has been electronically signed by Marco A Thompson DPM on March 8, 2017 4:45 PM              Electronically signed by Marco A Thompson DPM at 3/8/2017  4:47 PM      Jamaal Bravo MD at 3/8/2017  5:02 AM  Version 1 of 1            LOS: 6 days   Patient Care Team:  Jamaal Bravo MD as PCP - General    Chief Complaint: \"Feeling better; ready to go home tomorrow.\"    Subjective     History of Present Illness      Subjective:  Symptoms:  Stable.  No shortness of breath, malaise, cough, chest pain, weakness, headache, chest pressure, anorexia, diarrhea or anxiety.    Diet:  Adequate intake.  No nausea or vomiting.    Activity level: Activity impairment: Impaired/recent Right foot surgery.    Pain:  He complains of pain that is mild.  He reports pain is improving.  Pain is well controlled.        Patient states doing good and hopes to go home tomorrow.  Awaiting word/\"OK\" from Dr. Thompson.  CM has been to see patient and will plan on sending home with Rx for lift chair and knee-scooter/4-wheeled type walker to assist with his gait.  HH PT/OT will be ordered.  Patient denies HA, CP, palpitations, SOA/wheezing, n/v, diarrhea/constipation. A 10 point ROS was completed.  Other than the above pertinent positives, all systems negative.  History taken from: patient & chart.    Objective     Vital Signs  Temp:  [96.5 °F (35.8 °C)-98.6 °F (37 °C)] 98.6 °F (37 °C)  Heart Rate:  [72-93] 86  Resp:  [18] 18  BP: (108-121)/(59-70) 108/59    Objective:  General Appearance:  Comfortable and in no acute distress.    Vital signs: (most recent): Blood pressure 108/59, pulse 86, temperature 98.6 °F (37 °C), temperature source Oral, resp. " "rate 18, height 76\" (193 cm), weight 274 lb (124 kg), SpO2 92 %.  Vital signs are normal.  No fever.    Output: Producing urine and no stool output.    HEENT: Normal HEENT exam.    Lungs:  Normal respiratory rate and normal effort.  He is not in respiratory distress.  Breath sounds clear to auscultation.  No stridor.  No wheezes or rhonchi.    Heart: Normal rate.  Regular rhythm.  S1 normal and S2 normal.    Chest: No chest wall tenderness.  Symmetric chest wall expansion.   Abdomen: Abdomen is soft and non-distended.  There are no signs of ascites.  Bowel sounds are normal.   There is no epigastric area or no suprapubic area tenderness.  There is no rebound tenderness.  There is no guarding.   There is no mass. There is no splenomegaly. There is no hepatomegaly.   Extremities: Normal range of motion.  There is no local swelling or dependent edema.  (Right foot wrapped/elevated after surgery; C/D/I)  Pulses: Distal pulses are intact.    Neurological: Patient is alert and oriented to person, place and time.    Pupils:  Pupils are equal, round, and reactive to light.    Skin:  Warm and dry.  No rash.             Results Review:     I reviewed the patient's new clinical results.     Lab Results (last 24 hours)     Procedure Component Value Units Date/Time    POC Glucose Fingerstick [[id number]]  (Normal) Collected:  03/04/17 0416    Specimen:  Blood Updated:  03/07/17 0601     Glucose 77 mg/dL       Meter: [id number]: [id number] SISI BULLOCK       POC Glucose Fingerstick [[id number]]  (Abnormal) Collected:  03/06/17 2016    Specimen:  Blood Updated:  03/07/17 0608     Glucose 138 (H) mg/dL       RN NotifiedMeter: [id number]: [id number] GRACIELA DILL       CBC & Differential [[id number]] Collected:  03/07/17 0702    Specimen:  Blood Updated:  03/07/17 0721    Narrative:       The following orders were created for panel order CBC & Differential.  Procedure                               Abnormality         " Status                     ---------                               -----------         ------                     CBC Auto Differential[[id number]]         Abnormal            Final result                 Please view results for these tests on the individual orders.    CBC Auto Differential [[id number]]  (Abnormal) Collected:  03/07/17 0702    Specimen:  Blood Updated:  03/07/17 0721     WBC 7.90 10*3/mm3      RBC 4.25 (L) 10*6/mm3      Hemoglobin 12.3 (L) g/dL      Hematocrit 36.2 (L) %      MCV 85.2 fL      MCH 28.9 pg      MCHC 34.0 g/dL      RDW 13.6 %      RDW-SD 42.5 fl      MPV 10.2 fL      Platelets 159 10*3/mm3      Neutrophil % 61.6 %      Lymphocyte % 19.4 %      Monocyte % 16.1 (H) %      Eosinophil % 2.2 %      Basophil % 0.4 %      Immature Grans % 0.3 %      Neutrophils, Absolute 4.88 10*3/mm3      Lymphocytes, Absolute 1.53 10*3/mm3      Monocytes, Absolute 1.27 (H) 10*3/mm3      Eosinophils, Absolute 0.17 10*3/mm3      Basophils, Absolute 0.03 10*3/mm3      Immature Grans, Absolute 0.02 10*3/mm3     Comprehensive Metabolic Panel [[id number]]  (Abnormal) Collected:  03/07/17 0702    Specimen:  Blood Updated:  03/07/17 0725     Glucose 110 (H) mg/dL      BUN 20 mg/dL      Creatinine 0.91 mg/dL      Sodium 137 mmol/L      Potassium 4.3 mmol/L      Chloride 100 mmol/L      CO2 29.0 mmol/L      Calcium 8.9 mg/dL      Total Protein 7.4 g/dL      Albumin 3.80 g/dL      ALT (SGPT) 36 U/L      AST (SGOT) 30 U/L      Alkaline Phosphatase 55 U/L      Total Bilirubin 0.9 mg/dL      eGFR Non African Amer 87 mL/min/1.73      Globulin 3.6 (H) gm/dL      A/G Ratio 1.1 g/dL      BUN/Creatinine Ratio 22.0      Anion Gap 8.0 mmol/L     POC Glucose Fingerstick [[id number]]  (Normal) Collected:  03/07/17 0646    Specimen:  Blood Updated:  03/07/17 1132     Glucose 107 mg/dL       Meter: [id number]: [id number] BRANDI TANG       POC Glucose Fingerstick [[id number]]  (Normal) Collected:  03/06/17 3042    Specimen:   "Blood Updated:  03/07/17 1134     Glucose 111 mg/dL       RN NotifiedMeter: [id number]: [id number] MAXIMINO PACHECO       POC Glucose Fingerstick [[id number]]  (Normal) Collected:  03/07/17 1104    Specimen:  Blood Updated:  03/07/17 1135     Glucose 125 mg/dL       RN NotifiedSliding Scale AdminMeter: [id number]: [id number] MAXIMINO FISCHER       Tissue Exam [[id number]] Collected:  03/05/17 0947    Specimen:  Tissue from Toe, Right Updated:  03/07/17 1245     Case Report --      Surgical Pathology Report                         Case: [id number]                                  Authorizing Provider:  Marco A Thompson DPM        Collected:           03/05/2017 09:47 AM          Ordering Location:     [facility]             Received:            03/06/2017 10:53 AM                                 [facility]                                                              Pathologist:           Stef Zelaya MD                                                          Specimen:    Toe, Right, transmetatarsal resection                                                       Final Diagnosis --      TRANSMETATARSAL AMPUTATION, RIGHT FOOT:      CHRONIC OSTEOMYELITIS OF ONE SMALLER METATARSAL HEAD.      ACUTE ULCER IN RELATION TO 1ST METATARSAL HEAD.      OLD AMPUTATION OF 2ND TOE, 4TH TOE AND 5TH TOE.      MARKED HALLUX VALGUS.         Intradepartmental Consult --      Dr. Moore reviewed the histologic slides and concurs with the above diagnoses.       Gross Description --         The container is labeled \"toes of right foot\" and has a transmetatarsal amputation of the right foot which includes the great toe and the 3rd toe.  The 2nd, 4th and 5th toes are not identified due to old amputations.  The specimen also includes the metatarsal heads of the 5 metatarsal bones, bits of tendon, 2 segments of skin, bits of bone and fragments of soft tissue.  The soft tissue measures 5.0 x 4.0 x 3.0 cm.  The segments of skin " separately measure 8.0 x 2.0 x 1.8 cm and 5.0 x 1.5 x 1.5 cm.  The transmetatarsal amputation specimen including the 2 phalanges mentioned measures 10.5 x 9.0 x 3.5 cm.  The digital nails of the great toe and 3rd toe are thickened.  The skin appears smooth and white everywhere except over the metatarsal head of the 1st toe where there is a medial ulcer measuring 1.0 x 0.6 cm wide.  The great toe has marked hallux valgus.  Representative sections are embedded.  1A ulcer of medial foot; 1B and 1C 2 of the smaller metatarsal heads (2) decalcified; 1D 1st metatarsal head decalcified.       Embedded Images --     Blood Culture [[id number]]  (Normal) Collected:  03/02/17 1302    Specimen:  Blood from Arm, Left Updated:  03/07/17 1401     Blood Culture No growth at 5 days     Blood Culture [[id number]]  (Normal) Collected:  03/02/17 1305    Specimen:  Blood from Arm, Right Updated:  03/07/17 1401     Blood Culture No growth at 5 days     POC Glucose Fingerstick [[id number]]  (Normal) Collected:  03/07/17 1644    Specimen:  Blood Updated:  03/07/17 1737     Glucose 103 mg/dL       RN NotifiedMeter: [id number]: [id number] CHUCK HARRIS             Medication Review:     Current Facility-Administered Medications:   •  acetaminophen (TYLENOL) tablet 650 mg, 650 mg, Oral, Q4H PRN, Jamaal Bravo MD, 650 mg at 03/07/17 1750  •  bacitracin injection, , , PRN, Marco A Thompson DPM, 50,000 Units at 03/05/17 0839  •  bupivacaine (PF) (MARCAINE) 0.5 % injection, , , PRN, Tung Rosenthal DPM, 30 mL at 03/05/17 0839  •  dronedarone (MULTAQ) tablet 400 mg, 400 mg, Oral, Nightly, Jamaal Bravo MD, 400 mg at 03/07/17 2046  •  enoxaparin (LOVENOX) syringe 40 mg, 40 mg, Subcutaneous, Daily, Jamaal Bravo MD, 40 mg at 03/07/17 0853  •  fluticasone (FLONASE) 50 MCG/ACT nasal spray 1 spray, 1 spray, Each Nare, BID, Jamaal Bravo MD, 1 spray at 03/07/17 0854  •  gabapentin (NEURONTIN) capsule 300 mg, 300 mg, Oral, Q8H,  Jamaal Bravo MD, 300 mg at 03/07/17 2206  •  HYDROcodone-acetaminophen (NORCO) 7.5-325 MG per tablet 1 tablet, 1 tablet, Oral, Q6H PRN, Tung Rosenthal DPM, 1 tablet at 03/05/17 2058  •  insulin aspart (novoLOG) injection 0-9 Units, 0-9 Units, Subcutaneous, 4x Daily AC & at Bedtime, Jamaal Bravo MD, 0 Units at 03/02/17 1834  •  insulin detemir (LEVEMIR) injection 10 Units, 10 Units, Subcutaneous, Nightly, Jamaal Bravo MD, 10 Units at 03/03/17 2115  •  magnesium oxide (MAGOX) tablet 400 mg, 400 mg, Oral, Daily, Jamaal Bravo MD, 400 mg at 03/07/17 0854  •  metoprolol tartrate (LOPRESSOR) half tablet 12.5 mg, 12.5 mg, Oral, BID, Jamaal Bravo MD, 12.5 mg at 03/04/17 1725  •  Morphine sulfate (PF) injection 2 mg, 2 mg, Intravenous, Q2H PRN, Jamaal Bravo MD  •  pantoprazole (PROTONIX) EC tablet 40 mg, 40 mg, Oral, Q AM, Jamaal Bravo MD, 40 mg at 03/07/17 0612  •  Pharmacy to dose vancomycin, , Does not apply, Continuous PRN, Jamaal Bravo MD  •  sodium chloride (NS) irrigation solution, , , PRN, Marco A Thompson DPM, 2,000 mL at 03/05/17 0841  •  sodium chloride 0.9 % flush 1-10 mL, 1-10 mL, Intravenous, PRN, Jamaal Bravo MD, 10 mL at 03/02/17 1835  •  temazepam (RESTORIL) capsule 15 mg, 15 mg, Oral, Nightly PRN, Jamaal Bravo MD, 15 mg at 03/06/17 2226  •  valsartan (DIOVAN) tablet 40 mg, 40 mg, Oral, Nightly, Jamaal Bravo MD, 40 mg at 03/05/17 2056  •  vancomycin (VANCOCIN) 2,250 mg in sodium chloride 0.9 % 500 mL IVPB, 2,250 mg, Intravenous, Q12H, Jamaal Bravo MD, Last Rate: 0 mL/hr at 03/07/17 0541, 2,250 mg at 03/08/17 0317      Assessment&#47;Plan     Active Problems:    Foot osteomyelitis, right    Nodule of groin    Type 2 diabetes mellitus with skin complication    Status post transmetatarsal amputation of right foot      Assessment & Plan     POD #3 Right foot TMA (I&D was on 3/2):  Patient continues to do well; pain controlled.  PT started 3/6/17.   Will need lift chair and 4-wheeled knee scooter with HH PT/OT at time of d/c home.  Dr. Thompson coordinating his POC.  Plan will be d/c home in AM if agreed upon by Dr. Thompson.    2. T2 DM:  Controlled with SSI with proper ADA 2000 Lei diet.  Patient has NOT been requiring Levemir basal insulin.  Resume home/oral DM meds at d/c.     3. Right groin LNs:  No change.  F/u as outpatient.     Jamaal Bravo MD  03/08/17  5:02 AM    Time: 15 min         Electronically signed by Jamaal Bravo MD at 3/8/2017  5:19 AM      Marco A Thompson DPM at 3/8/2017  4:47 PM  Version 1 of 1            LOS: 6 days   Patient Care Team:  Jamaal Bravo MD as PCP - General    Chief Complaint: right foot infection      Subjective   Patient seen at bedside resting comfortably. NAD. Denies n/v/f/c/sob/cp. He has no pain. PT at bedside.   History taken from: patient    Objective     Vital Signs  Temp:  [96.4 °F (35.8 °C)-98.9 °F (37.2 °C)] 96.4 °F (35.8 °C)  Heart Rate:  [72-93] 89  Resp:  [18] 18  BP: (106-113)/(57-70) 108/69    Objective     Constitutional: well developed, well nourished    HEENT: Normocephalic and atraumatic, normal hearing    Respiratory: Non labored respirations noted    RLE: posterior splint is c/d/i. No pain. Negative homans.        Results Review:     I reviewed the patient's new clinical results.      Assessment&#47;Plan     Active Problems:    Foot osteomyelitis, right    Nodule of groin    Type 2 diabetes mellitus with skin complication    Status post transmetatarsal amputation of right foot      VSS, afebrile  No leukocytosis  S/p I&D right foot by Dr. Rosenthal on 3/2, Gastrocrecession and TMA on 3/5/17.  No further surgery planned on this admission  NWB to RLE with knee scootersplint c/d/i. Do not remove or get wet  OK to dc antibioticsto dc home Thursday  Pt to follow up Monday 3/13 in Podiatry clinic  Please do not hesitate to call with questions          This document has been electronically signed by  "Marco A Thompson DPM on 2017 4:47 PM              Electronically signed by Marco A Thompson DPM at 3/8/2017  4:51 PM        Consult Notes (last 72 hours) (Notes from 3/6/2017  8:52 AM through 3/9/2017  8:52 AM)     No notes of this type exist for this encounter.          [address] [address]  Phone: [phone]  Fax:  Date Ordered: Mar 9, 2017         Patient: Emre Lisa MRN: [id number]   [address] [place] [address] : 1960  SSN:    Phone: [phone] Sex: M     Weight: 274 lb (124 kg)         Ht Readings from Last 1 Encounters:   17 76\" (193 cm)         Wheelchair (Order ID: [id number])   Diagnosis: Acute osteomyelitis of right foot (M86.171 [ICD-10-CM] 730.07 [ICD-9-CM])  Impaired physical mobility (Z74.09 [ICD-10-CM] 781.99 [ICD-9-CM])  Status post transmetatarsal amputation of right foot (Z89.431 [ICD-10-CM] V49.73 [ICD-9-CM])   Quantity: 1     Equipment:  Heavy Duty Wheelchair, weight capacity 251-300 pounds  Wheelchair accessories:  Manual W/C Seat Widths 24-27 inches  The face to face evaluation was performed on: 3/9/2017  Length of Need (99 Months = Lifetime): 3 Months            Authorizing Provider:Jamaal Bravo MD  Order Entered By: Jamaal Bravo MD 3/9/2017 5:16 AM     Electronically signed by: Jamaal Bravo MD (NPI: [id number]) 3/9/2017 5:16 AM            [address] [address]  Phone: [phone]  Fax:  Date Ordered: Mar 9, 2017         Patient: Emre Lisa MRN: [id number]   [address] [place] [address] : 1960  SSN:    Phone: [phone] Sex: M     Weight: 274 lb (124 kg)         Ht Readings from Last 1 Encounters:   17 76\" (193 cm)         Walker (Order ID: [id number])   Diagnosis: Acute osteomyelitis of right foot (M86.171 [ICD-10-CM] 730.07 [ICD-9-CM])  Impaired " "physical mobility (Z74.09 [ICD-10-CM] 781.99 [ICD-9-CM])  Status post transmetatarsal amputation of right foot (Z89.431 [ICD-10-CM] V49.73 [ICD-9-CM])   Quantity: 1  Comments: This is for a 4-wheeled knee/hip replacement scooter (NOT actual walker!)     Equipment:  Walker Folding with Wheels  Length of Need (99 Months = Lifetime): 3 Months            Authorizing Provider:Jamaal Bravo MD  Order Entered By: Jamaal Bravo MD 3/9/2017 5:16 AM     Electronically signed by: Jamaal Bravo MD (NPI: [id number]) 3/9/2017 5:16 AM            [address] [address]  Phone: [phone]  Fax:  Date Ordered: Mar 9, 2017         Patient: Emre Lisa MRN: [id number]   [address] [facility]  [place] [address] : 1960  SSN:    Phone: [phone] Sex: M     Weight: 274 lb (124 kg)         Ht Readings from Last 1 Encounters:   17 76\" (193 cm)         Miscellaneous DME (Order ID: [id number])   Diagnosis: Acute osteomyelitis of right foot (M86.171 [ICD-10-CM] 730.07 [ICD-9-CM])  Impaired physical mobility (Z74.09 [ICD-10-CM] 781.99 [ICD-9-CM])  Status post transmetatarsal amputation of right foot (Z89.431 [ICD-10-CM] V49.73 [ICD-9-CM])   Quantity: 1     Type of DME: Lift chair  Length of Need (99 Months = Lifetime): 3 Months            Authorizing Provider:Jamaal Bravo MD  Order Entered By: Jamaal Bravo MD 3/9/2017 5:16 AM     Electronically signed by: Jamaal Bravo MD (NPI: [id number]) 3/9/2017 5:16 AM            "

## 2017-03-09 NOTE — TREATMENT PLAN
I certify that the Emre Lisa is under my care and that I, or a nurse practitioner or a clinical nurse specialist or a physician's assistant working with me, had a Face to Face encounter that meets the physician face-to-face encounter requirements with the patient on 3/9/2017.    Wheelchair:  4-wheeled knee scooter  1. Does the patient have a mobility limitation that impairs the ability to perform Mobility Related Activities of Daily Living (MRADLs) that can be safely resolved with the use of a walker? yes  2.Will the mobility limitation be significantly improved with the use of the walker that is being ordered and has a cane or crutches been ruled out? yes  3. Will the patient’s home provide adequate access between rooms, maneuvering space, and surfaces for use of the manual wheelchair that is being provided? yes  4. Does the patient have willingness, sufficient upper extremity function and other physical and mental capabilities needed to safely propel the wheelchair or a caregiver who is available to provide assistance with the wheelchair? yes  5. Does the patient require a Ulises-wheelchair, seat height of less than 19”, due to short stature or to enable the patient to place feet on ground for propulsion? no  6.  Is a lightweight wheelchair being ordered because the patient cannot self propel a standard wheelchair in the home? no  7. Does the patient have a musculoskeletal condition or a cast / brace that prevents 90 flexion at the knee or significant edema? no  8. Does the patient have weak upper body muscles, upper body instability or muscle spasticity which requires the use of a safety belt/pelvic strap for proper positioning. no    Wheelchair accessories:  1. Does the patient meet coverage criteria for a manual wheelchair or a power wheelchair with a sling/solid seat/back? yes  2. Does the patient have a current pressure ulcer or past history of a pressure ulcer on the area of contact with the seating  surface? no  3. Does the patient have absent or impaired sensation in the area of contact with the seating surface or the inability to carry out a functional weight shift due to one of the following diagnoses?: spinal cord injury resulting in quadriplegia or paraplegia, other spinal cord disease, multiple sclerosis, other demyelinating disease, cerebral palsy, anterior horn cell diseases including amyotrophic lateral sclerosis, post polio paralysis, traumatic brain injury resulting in quadriplegia, spina bifida, childhood cerebral degeneration, Alzheimer's disease, Parkinson's disease, muscular dystrophy, hemiplegia, [place]'s chorea, idiopathic torsion dystonia, athetoid cerebral palsy. no  4. Does the patient have any significant postural asymmetries that are due to one of the diagnoses listed in question 3 above or to one of the following diagnoses?: monoplegia of the lower limb due to stroke, traumatic brain injury, or other etiology, spinocerebellar disease, above knee leg amputation, osteogenesis imperfecta, transverse myelitis. no    Does the patient need a Foam-General Use Seat Cushion? no  Does the patient need a Skin Protection cushion? no  Does the patient need a Positioning seat back? no  Does the patient need a Skin protection & Positioning cushion or seat back? no

## 2017-03-09 NOTE — DISCHARGE PLACEMENT REQUEST
"Amee Lisa (56 y.o. Male)     Date of Birth Social Security Number Address Home Phone MRN    1960  [address]  [place] [address] [phone] [id number]    Jehovah's witness Marital Status          Restorationism        Admission Date Admission Type Admitting Provider Attending Provider Department, Room/Bed    3/2/17 Urgent Jamaal Bravo MD McGhee, Timothy R, MD [address], 354/1    Discharge Date Discharge Disposition Discharge Destination         Home or Self Care             Attending Provider: Jamaal Bravo MD     Allergies:  Januvia [Sitagliptin], Lipitor [Atorvastatin], Peanut Oil, Peanut-containing Drug Products, Penicillins, Sulfa Antibiotics    Isolation:  None   Infection:  None   Code Status:  FULL    Ht:  76\" (193 cm)   Wt:  274 lb (124 kg)    Admission Cmt:  None   Principal Problem:  Status post transmetatarsal amputation of right foot [Z89.431]                 Active Insurance as of 3/2/2017     Primary Coverage     Payor Plan Insurance Group Employer/Plan Group    MEDICARE MEDICARE A & B      Payor Plan Address Payor Plan Phone Number Effective From Effective To    [address] [phone] 12/1/2012     [place] [address]       Subscriber Name Subscriber Birth Date Member ID       AMEE LISA 1960 [id number]           Secondary Coverage     Payor Plan Insurance Group Employer/Plan Group     BENEFIT PLAN  BENEFIT PLAN [id number]     Payor Plan Address Payor Plan Phone Number Effective From Effective To    [address] [phone] 1/1/2017     [place] [address]       Subscriber Name Subscriber Birth Date Member ID       AMEE LISA 1960 [id number]                 Emergency Contacts      (Rel.) Home Phone Work Phone Mobile Phone    SloanReymundo (Spouse) [phone] -- [phone]          [address] " "[address]  Phone: [phone]  Fax:  Date Ordered: Mar 9, 2017         Patient: Emre Lisa MRN: [id number]   [address] [place] [address] : 1960  SSN:    Phone: [phone] Sex: M     Weight: 274 lb (124 kg)         Ht Readings from Last 1 Encounters:   17 76\" (193 cm)         Standard Wheelchair (Order ID: [id number])   Diagnosis: Status post transmetatarsal amputation of right foot (Z89.431 [ICD-10-CM] V49.73 [ICD-9-CM])   Quantity: 1     Equipment:  Heavy Duty Wheelchair, weight capacity 251-300 pounds  Wheelchair accessories:  Manual W/C Seat Widths 24-27 inches  Wheelchair accessories:  Elevating Leg Rest (pair)  The face to face evaluation was performed on: 3/9/2017  Length of Need (99 Months = Lifetime): 3 Months            Authorizing Provider:Jamaal Bravo MD  Order Entered By: Jamaal Bravo MD 3/9/2017 9:28 AM     Electronically signed by: Jamaal Bravo MD (NPI: [id number]) 3/9/2017 9:28 AM                "

## 2017-03-09 NOTE — THERAPY DISCHARGE NOTE
Acute Care - Physical Therapy Treatment Note/Discharge  [facility]     Patient Name: Emre Lisa  : 1960  MRN: [id number]  Today's Date: 3/9/2017     Date of Referral to PT: 17  Referring Physician: Dr. Bravo    Admit Date: 3/2/2017    Visit Dx:    ICD-10-CM ICD-9-CM   1. Status post transmetatarsal amputation of right foot Z89.431 V49.73   2. Acute osteomyelitis of right foot M86.171 730.07   3. Impaired physical mobility Z74.09 781.99     Patient Active Problem List   Diagnosis   • Foot osteomyelitis, right   • Nodule of groin   • Type 2 diabetes mellitus with skin complication   • Status post transmetatarsal amputation of right foot       Physical Therapy Education     Title: PT OT SLP Therapies (Resolved)     Topic: Physical Therapy (Resolved)     Point: Mobility training (Resolved)    Learning Progress Summary    Learner Readiness Method Response Comment Documented by Status   Patient Acceptance E VU  MP 17 1717 Done    Acceptance E VU   17 1132 Done               Point: Home exercise program (Resolved)    Learning Progress Summary    Learner Readiness Method Response Comment Documented by Status   Patient Eager E,TB,H DU,VU HEP given AM 17 0920 Done               Point: Body mechanics (Resolved)    Learning Progress Summary    Learner Readiness Method Response Comment Documented by Status   Patient Acceptance E VU  MP 17 1717 Done    Acceptance E VU  MP 17 1132 Done               Point: Precautions (Resolved)    Learning Progress Summary    Learner Readiness Method Response Comment Documented by Status   Patient Acceptance E VU  MP 17 1717 Done    Acceptance E VU   17 1132 Done                      User Key     Initials Effective Dates Name Provider Type Discipline    AM 10/17/16 -  Denver Murcia, PTA Physical Therapy Assistant PT    MP 10/17/16 -  Tung Liu, PT Physical Therapist PT                    IP PT Goals       17  0920 03/08/17 1305 03/08/17 0920    Transfer Training PT LTG    Transfer Training PT  LTG, Date Goal Reviewed 03/09/17  -AM 03/08/17  -AM 03/08/17  -AM    Transfer Training PT LTG, Outcome goal met  -AM goal not met  -AM goal not met  -AM    Gait Training PT LTG    Gait Training Goal PT LTG, Date Goal Reviewed 03/09/17  -AM 03/08/17  -AM 03/08/17  -AM    Gait Training Goal PT LTG, Outcome goal met  -AM goal not met  -AM goal not met  -AM    Stair Training PT LTG    Stair Training Goal PT LTG, Date Goal Reviewed 03/09/17  -AM 03/08/17  -AM 03/08/17  -AM    Stair Training Goal PT LTG, Outcome goal not met  -AM goal not met  -AM goal not met  -AM    Stair Training Goal PT LTG, Reason Goal Not Met discharged from facility  -AM        03/07/17 1713 03/06/17 1129       Transfer Training PT LTG    Transfer Training PT LTG, Date Established  03/06/17  -MP     Transfer Training PT LTG, Time to Achieve  by discharge  -MP     Transfer Training PT LTG, Activity Type  all transfers  -MP     Transfer Training PT LTG, [place] Level  conditional independence  -MP     Transfer Training PT LTG, Assist Device  walker, rolling  -MP     Transfer Training PT  LTG, Date Goal Reviewed 03/07/17  -MP      Transfer Training PT LTG, Outcome goal ongoing  -MP      Gait Training PT LTG    Gait Training Goal PT LTG, Date Established  03/06/17  -MP     Gait Training Goal PT LTG, Time to Achieve  by discharge  -MP     Gait Training Goal PT LTG, [place] Level  conditional independence  -MP     Gait Training Goal PT LTG, Assist Device  walker, rolling   knee sling  -MP     Gait Training Goal PT LTG, Distance to Achieve  50  -MP     Gait Training Goal PT LTG, Date Goal Reviewed 03/07/17  -MP      Gait Training Goal PT LTG, Outcome goal ongoing  -MP      Stair Training PT LTG    Stair Training Goal PT LTG, Date Established  03/06/17  -MP     Stair Training Goal PT LTG, Time to Achieve  by discharge  -MP     Stair Training Goal PT LTG, Number  of Steps  4  -MP     Stair Training Goal PT LTG, [place] Level  conditional independence  -MP     Stair Training Goal PT LTG, Assist Device  walker, rolling  -MP     Stair Training Goal PT LTG, Date Goal Reviewed 03/07/17  -MP      Stair Training Goal PT LTG, Outcome goal ongoing  -MP        User Key  (r) = Recorded By, (t) = Taken By, (c) = Cosigned By    Initials Name Provider Type    AM Denver Murcia PTA Physical Therapy Assistant    PERNELL Liu, PT Physical Therapist              Adult Rehabilitation Note       03/09/17 0920 03/08/17 1305 03/08/17 0920    Rehab Assessment/Intervention    Discipline physical therapy assistant  -AM physical therapy assistant  -AM physical therapy assistant  -AM    Document Type therapy note (daily note)  -AM therapy note (daily note)  -AM therapy note (daily note)  -AM    Subjective Information agree to therapy;complains of;pain  -AM agree to therapy;no complaints  -AM agree to therapy;complains of;pain  -AM    Patient Effort, Rehab Treatment good  -AM good  -AM good  -AM    Symptoms Noted During/After Treatment none  -AM none  -AM none  -AM    Precautions/Limitations fall precautions;non-weight bearing status  -AM fall precautions;non-weight bearing status  -AM fall precautions;non-weight bearing status  -AM    Equipment Issued to Patient gait belt  -AM gait belt  -AM gait belt  -AM    Recorded by [AM] Denver Murcia PTA [AM] Denver Murcia PTA [AM] Denver Murcia PTA    Vital Signs    Pre Systolic BP Rehab 122  -  -  -AM    Pre Treatment Diastolic BP 67  -AM 69  -AM 57  -AM    Post Systolic BP Rehab 109  -AM  128  -AM    Post Treatment Diastolic BP 70  -AM  76  -AM    Pretreatment Heart Rate (beats/min) 73  -AM 81  -AM 77  -AM    Posttreatment Heart Rate (beats/min) 89  -AM  88  -AM    Pre SpO2 (%) 98  -AM 99  -AM 98  -AM    O2 Delivery Pre Treatment room air  -AM room air  -AM room air  -AM    Post SpO2 (%) 98  -AM  98  -AM    O2 Delivery Post  Treatment room air  -AM  room air  -AM    Pre Patient Position Supine  -AM Sitting  -AM Supine  -AM    Intra Patient Position Standing  -AM Standing  -AM Standing  -AM    Post Patient Position Sitting  -AM Sitting  -AM Sitting  -AM    Recorded by [AM] Denver Murcia PTA [AM] Denver Murcia PTA [AM] Denver Murcia PTA    Pain Assessment    Pain Assessment 0-10  -AM No/denies pain  -AM 0-10  -AM    Pain Score 2  -AM  3  -AM    Post Pain Score 2  -AM  3  -AM    Pain Type Acute pain  -AM  Acute pain  -AM    Pain Location Neck  -AM  Neck  -AM    Pain Orientation Mid  -AM  Mid  -AM    Pain Descriptors Sore  -AM  Sore  -AM    Pain Frequency Intermittent  -AM  Constant/continuous  -AM    Date Pain First Started 03/02/17  -AM  03/02/17  -AM    Clinical Progression Gradually improving  -AM  Gradually improving  -AM    Patient's Stated Pain Goal No pain  -AM  No pain  -AM    Pain Intervention(s) Ambulation/increased activity  -AM  Ambulation/increased activity  -AM    Result of Injury No  -AM  No  -AM    Work-Related Injury No  -AM  No  -AM    Multiple Pain Sites No  -AM  No  -AM    Recorded by [AM] Denver Murcia PTA [AM] Denver Murcia PTA [AM] Denver Murcia PTA    Cognitive Assessment/Intervention    Current Cognitive/Communication Assessment functional  -AM functional  -AM functional  -AM    Orientation Status oriented x 4  -AM oriented x 4  -AM oriented x 4  -AM    Follows Commands/Answers Questions 100% of the time  -% of the time  -% of the time  -AM    Personal Safety decreased awareness, need for safety  -AM decreased awareness, need for safety  -AM decreased awareness, need for safety  -AM    Personal Safety Interventions gait belt;nonskid shoes/slippers when out of bed;supervised activity  -AM gait belt;nonskid shoes/slippers when out of bed;supervised activity  -AM gait belt;nonskid shoes/slippers when out of bed;supervised activity  -AM    Recorded by [AM] Denver Murcia PTA [AM] Denver  GINETTE Murcia, CHENTE [AM] Denver Murcia PTA    ROM (Range of Motion)    General ROM lower extremity range of motion deficits identified  -AM lower extremity range of motion deficits identified  -AM lower extremity range of motion deficits identified  -AM    Recorded by [AM] Denver Murcia PTA [AM] Denver Murcia PTA [AM] Denver Murcia PTA    General LE Assessment    ROM ankle, right: LE ROM deficit  -AM ankle, right: LE ROM deficit  -AM ankle, right: LE ROM deficit  -AM    ROM Detail R ankle in cast  -AM R ankle in cast  -AM R ankle in cast  -AM    Recorded by [AM] Denver Murcia PTA [AM] Denver Murcia, PTA [AM] Denver Murcia PTA    Mobility Assessment/Training    Extremity Weight-Bearing Status right lower extremity  -AM right lower extremity  -AM right lower extremity  -AM    Right Lower Extremity Weight-Bearing weight-bearing as tolerated  -AM non weight-bearing  -AM non weight-bearing  -AM    Recorded by [AM] Dnever Murcia PTA [AM] Denver Murcia PTA [AM] Denver Murcia PTA    Bed Mobility, Assessment/Treatment    Bed Mobility, Roll Left, [place] independent  -AM not tested  -AM independent  -AM    Bed Mobility, Roll Right, [place] independent  -AM not tested  -AM independent  -AM    Bed Mobility, Scoot/Bridge, [place] independent  -AM not tested  -AM independent  -AM    Bed Mob, Supine to Sit, [place] independent  -AM not tested  -AM independent  -AM    Bed Mob, Sit to Supine, [place] independent  -AM not tested  -AM independent  -AM    Bed Mob, Sidelying to Sit, [place] not tested  -AM not tested  -AM not tested  -AM    Bed Mob, Sit to Sidelying, [place] not tested  -AM not tested  -AM not tested  -AM    Recorded by [AM] Denver Murcia, CHENTE [AM] Denver Murcia, PTA [AM] Denver Murcia PTA    Transfer Assessment/Treatment    Transfers, Bed-Chair [place] conditional independence  -AM stand by assist;contact guard assist  -AM contact guard assist  -AM     Transfers, Chair-Bed [place] conditional independence  -AM stand by assist;contact guard assist  -AM contact guard assist  -AM    Transfers, Bed-Chair-Bed, Assist Device other (see comments)   knee scooter  -AM other (see comments)   knee scooter  -AM other (see comments)   knee scooter  -AM    Transfers, Sit-Stand [place] conditional independence  -AM stand by assist;contact guard assist  -AM contact guard assist  -AM    Transfers, Stand-Sit [place] conditional independence  -AM stand by assist;contact guard assist  -AM contact guard assist  -AM    Transfers, Sit-Stand-Sit, Assist Device other (see comments)   knee scooter  -AM other (see comments)   knee scooter  -AM other (see comments)   knee scooter  -AM    Toilet Transfer, [place] not tested  -AM supervision required;contact guard assist  -AM not tested  -AM    Toilet Transfer, Assistive Device  other (see comments)   knee scooter  -AM     Walk-In Shower Transfer, [place] not tested  -AM not tested  -AM not tested  -AM    Bathtub Transfer, [place] not tested  -AM not tested  -AM not tested  -AM    Transfer, Maintain Weight Bearing Status able to maintain weight bearing status  -AM able to maintain weight bearing status  -AM able to maintain weight bearing status  -AM    Transfer, Safety Issues step length decreased  -AM step length decreased  -AM step length decreased  -AM    Transfer, Impairments ROM decreased;strength decreased;pain  -AM ROM decreased;strength decreased;impaired balance  -AM ROM decreased;strength decreased;impaired balance;pain  -AM    Recorded by [AM] Denver Murcia, PTA [AM] Denver Murcia, PTA [AM] Denver Murcia, PTA    Gait Assessment/Treatment    Gait, [place] Level conditional independence  -AM stand by assist;contact guard assist  -AM contact guard assist  -AM    Gait, Assistive Device other (see comments)   knee scooter  -AM other (see comments)   knee scooter  -AM other (see comments)    knee scooter  -AM    Gait, Distance (Feet) 200  -  -  -AM    Gait, Gait Pattern Analysis 2-point gait  -AM 2-point gait  -AM 2-point gait  -AM    Gait, Gait Deviations bilateral:;[place] decreased  -AM bilateral:;[place] decreased  -AM bilateral:;[place] decreased  -AM    Gait, Maintain Weight Bearing Status able to maintain weight bearing status  -AM able to maintain weight bearing status  -AM able to maintain weight bearing status  -AM    Gait, Safety Issues step length decreased  -AM step length decreased  -AM step length decreased  -AM    Gait, Impairments ROM decreased;strength decreased;pain  -AM ROM decreased;strength decreased;impaired balance  -AM ROM decreased;strength decreased;impaired balance;pain  -AM    Recorded by [AM] Denver Murcia PTA [AM] Denver Murcia PTA [AM] Denver Murcia PTA    Stairs Assessment/Treatment    Stairs, [place] Level not tested   Wife is going to t/f pt up steps in wheelchair  -AM not tested  -AM not tested  -AM    Recorded by [AM] Denver Murcia PTA [AM] Denver Murcia PTA [AM] Denver Murcia PTA    Therapy Exercises    Bilateral Lower Extremities AROM:;20 reps;supine;sitting;ankle pumps/circles;glut sets;heel slides;LAQ;quad sets;SLR  -AM  AROM:;20 reps;supine;sitting;ankle pumps/circles;glut sets;heel slides;LAQ;quad sets;SLR   toe wiggle  -AM    Recorded by [AM] Denver Murcia PTA  [AM] Denver Murcia PTA    Positioning and Restraints    Pre-Treatment Position in bed  -AM sitting in chair/recliner  -AM in bed  -AM    Post Treatment Position bed  -AM bsc  -AM chair  -AM    In Bed supine;call light within reach;encouraged to call for assist  -AM      In Chair   reclined;call light within reach;encouraged to call for assist;legs elevated  -AM    On BS commode  notified nsg;sitting;call light within reach;encouraged to call for assist  -AM     Recorded by [AM] Denver Murcia PTA [AM] Denver Murcia PTA [AM] Denver Murcia PTA      User  Key  (r) = Recorded By, (t) = Taken By, (c) = Cosigned By    Initials Name Effective Dates    AM Denver Murcia, PTA 10/17/16 -           PT Recommendation and Plan  Anticipated Equipment Needs At Discharge: front wheeled walker, knee sling, rolling knee walker  Anticipated Discharge Disposition: home with home health  Planned Therapy Interventions: balance training, gait training, stair training, transfer training  PT Frequency: 5 times/wk  Plan of Care Review  Plan Of Care Reviewed With: patient  Progress: progress toward functional goals as expected  Outcome Summary/Follow up Plan: Pt met 2/3 PT goals prior to d/c. At time of discharge, pt ind w/bed mobility - conditional independence w/all transfers and gait(200 ft). Pt d/c to home w/HH and caregiver.          Outcome Measures       03/09/17 0920 03/08/17 1305 03/08/17 0920    How much help from another person do you currently need...    Turning from your back to your side while in flat bed without using bedrails? 4  -AM 4  -AM 4  -AM    Moving from lying on back to sitting on the side of a flat bed without bedrails? 4  -AM 4  -AM 4  -AM    Moving to and from a bed to a chair (including a wheelchair)? 4  -AM 3  -AM 3  -AM    Standing up from a chair using your arms (e.g., wheelchair, bedside chair)? 4  -AM 3  -AM 3  -AM    Climbing 3-5 steps with a railing? 1  -AM 1  -AM 1  -AM    To walk in hospital room? 4  -AM 3  -AM 3  -AM    AM-PAC 6 Clicks Score 21  -AM 18  -AM 18  -AM    Functional Assessment    Outcome Measure Options AM-PAC 6 Clicks Basic Mobility (PT)  -AM AM-PAC 6 Clicks Basic Mobility (PT)  -AM AM-PAC 6 Clicks Basic Mobility (PT)  -AM      03/07/17 1700 03/06/17 1100       How much help from another person do you currently need...    Turning from your back to your side while in flat bed without using bedrails? 4  -MP 4  -MP     Moving from lying on back to sitting on the side of a flat bed without bedrails? 4  -MP 4  -MP     Moving to and from a bed  to a chair (including a wheelchair)? 3  -MP 3  -MP     Standing up from a chair using your arms (e.g., wheelchair, bedside chair)? 4  -MP 4  -MP     Climbing 3-5 steps with a railing? 2  -MP 2  -MP     To walk in hospital room? 3  -MP 3  -MP     AM-PAC 6 Clicks Score 20  -MP 20  -MP     Functional Assessment    Outcome Measure Options AM-PAC 6 Clicks Basic Mobility (PT)  -MP AM-PAC 6 Clicks Basic Mobility (PT)  -MP       User Key  (r) = Recorded By, (t) = Taken By, (c) = Cosigned By    Initials Name Provider Type    AM Denver Murcia PTA Physical Therapy Assistant    MP Tung Liu PT Physical Therapist           Time Calculation:         PT Charges       03/09/17 0920          Time Calculation    Start Time 0920  -AM      Stop Time 1000  -AM      Time Calculation (min) 40 min  -AM      PT Received On 03/09/17  -AM      Time Calculation- PT    Total Timed Code Minutes- PT 40 minute(s)  -AM        User Key  (r) = Recorded By, (t) = Taken By, (c) = Cosigned By    Initials Name Provider Type    AM Denver Murcia PTA Physical Therapy Assistant          Therapy Charges for Today     Code Description Service Date Service Provider Modifiers Qty    29088219905 HC GAIT TRAINING EA 15 MIN 3/8/2017 Denver Murcia PTA GP 1    31833878269 HC PT THER PROC GROUP 3/8/2017 Denver Murcia PTA GP 2    88924911813 HC GAIT TRAINING EA 15 MIN 3/8/2017 Denver uMrcia PTA GP 1    90702921235 HC PT THER PROC GROUP 3/8/2017 Denver Murcia PTA GP 2    06725657166 HC GAIT TRAINING EA 15 MIN 3/9/2017 Denver Murcia PTA GP 1    10347611354 HC PT THER PROC GROUP 3/9/2017 Denver Murcia PTA GP 2          PT G-Codes  PT Professional Judgement Used?: Yes  Outcome Measure Options: AM-PAC 6 Clicks Basic Mobility (PT)  Score: 20  Functional Limitation: Mobility: Walking and moving around  Mobility: Walking and Moving Around Current Status (): At least 20 percent but less than 40 percent impaired, limited or  restricted  Mobility: Walking and Moving Around Goal Status (): At least 1 percent but less than 20 percent impaired, limited or restricted    PT Discharge Summary  Anticipated Discharge Disposition: home with home health  Reason for Discharge: Discharge from facility  Outcomes Achieved: Patient able to partially acheive established goals  Discharge Destination: Home with home health    Denver Murcia, PTA  3/9/2017

## 2017-03-09 NOTE — DISCHARGE SUMMARY
"  Date of Discharge:  3/9/2017    Discharge Diagnosis:   1. POD #4 Right Transmetatarsal foot amputation due to Osteomyelitis/DFU  2. Tender Right groin nodule/Lymph Node  3. Type 2 DM    Presenting Problem/History of Present Illness  Foot osteomyelitis, right [M86.9]  Acute osteomyelitis of right foot [M86.171]  Acute osteomyelitis of right foot [M86.171]     Hospital Course  Emre was seen at [facility] on 3/2/17 for worsening right foot pain/swelling/drainage and difficulty walking. He has difficult to control T2 DM and h/o previous DFU/toe amputations. He was to have Right 4th toe amputation by Dr. Marco A Thompson ([facility]) in the next 2 weeks, and has recently been on Doxycycline for cellulitis. He was directly admitted for Right foot Osteomyelitis/DFU (failure to heal).  He was seen by Podiatry (Dr. Tung Rosenthal) and had I&D of infection/bone late on 3/2/17.  His infection was also treated with IV Vancomycin.  Cultures were negative.  Initial x-ray at [facility] showed OM/suspected gas gangrene near head of 4th/5th MT.  His 2nd, 4th, 5th toes on Right side had previous been amputated.  Repeat x-ray upon admission showed: diffuse ST swelling/ulceration near 4th/5th MT heads, but no mention of gas gangrene.      Patient also had NT right groin lump, consistent with LN, that was bothersome to him and US was ordered during his stay.  This showed reactive/inflammatory LNs and less like differential was LN of malignancy.  This will be followed as outpatient.  Dr. Thompson ([facility] saw patient on 3/5/17 and performed Right foot Transmetatarsal Amputation (TMA).  Patient did fairly well with PT, but required lift chair, folding walker, 4 wheeled/knee replacement-type scooter and HH PT/OT to be arranged at time of d/c.    Patient's BS remained well controlled <120 mg/dl on SSI and proper ADA diet.  He required little to no Levemir SC insulin. Patient c/o tender right groin \"nodule.\"  Ultrasound of this area showed " what appeared to be reactive LNs, but could not rule out neoplastic issue (see report below).  Will follow this as outpatient.  Patient has reached maximum benefit from hospitalization and is agreeable to d/c home.    Procedures Performed  Procedure(s):  RIGHT AMPUTATION TRANSMETATRASAL , RECESSION GASTROCNEMOUS    Imaging Results (last 7 days)     Procedure Component Value Units Date/Time    XR Foot 3+ View Right [[id number]] Collected:  03/02/17 1756     Updated:  03/02/17 1801    Narrative:       Patient Name:  AMEE FONG  Patient ID:  [id number]   Ordering:  ALVIN ROYAL  Attending:  NITISH DILLARD  Referring:  ALVIN ROYAL  ------------------------------------------------    DATE OF EXAM: 3/2/2017 4:39 PM CST    PROCEDURE: XR FOOT 3 OR MORE VIEWS    INDICATION FOR PROCEDURE: 56 years old patient presents for  evaluation of right foot infection.    TECHNIQUE:  Three views of the right foot are obtained portably.    COMPARISON:  Radiographs of the right foot dated December 14, 2016.    FINDINGS:  Patient has had amputations of the phalanges of the  second, fourth and fifth toes. This is unchanged in the previous  study. There is soft tissue swelling of the foot particularly  dorsally.    There is severe hallux valgus. There are degenerative changes at  the first metatarsophalangeal joint. Erosions are visible within  the first metatarsal head that suggests possibility of callus.  Erosions are also apparent of the proximal phalanx of the hallux.    There is soft tissue lucency near the fourth metatarsal head that  may represent an ulceration. There may be small erosions within  the third, and fourth metatarsal heads.    There is a posterior calcaneal enthesophyte, plantar calcaneal  enthesophyte and spurs. There may also be some chondrocalcinosis  of the plantar fascia. There are erosive and degenerative changes  at the intertarsal articulations and tarsal metatarsal  articulations.       Impression:         1.  Diffuse soft tissue swelling and evidence for ulceration near  the fourth metatarsal.   2.  Severe hallux valgus.   3.  Erosive and degenerative changes of the foot as described.   4.  Calcaneal spurs.    Electronically signed by:  Yana Waller MD  3/2/2017 5:59 PM CST  Workstation: [id number]     Nonvascular Extremity Limited [[id number]] Collected:  03/03/17 1322     Updated:  03/03/17 1325    Narrative:       Patient Name:  AMEE FONG  Patient ID:  [id number]   Ordering:  NITISH DILLARD  Attending:  NITISH DILLARD  Referring:  NITISH DILLARD  ------------------------------------------------    Ultrasound nonvascular extremity, right groin, inguinal region.    HISTORY: Palpable lump right groin.        There are multiple enlarged right inguinal lymph nodes with  increased vascularity the largest measures 3.58 x 1.18 x 4.33 cm  in diameter.      Impression:       CONCLUSION: Multiple enlarged right inguinal lymph nodes.  Differential diagnosis include reactive adenopathy secondary to  an inflammatory or infectious process versus neoplastic lymph  nodes.     For further evaluation, if clinically indicated surgical  excisional biopsy may be beneficial.    Electronically signed by:  Gatito Cr MD  3/3/2017 1:24 PM CST  Workstation: [id number]    XR Foot 3+ View Right [[id number]] Collected:  03/05/17 1126     Updated:  03/05/17 1129    Narrative:       Patient Name:  AMEE FONG  Patient ID:  [id number]   Ordering:  JACQUELINE BUENO  Attending:  NITISH DILLARD  Referring:  JACQUELINE BUENO  ------------------------------------------------    Procedure:  Right foot        Indication:  Post transmetatarsal amputation. Osteomyelitis..    Technique:  Three views   .    Prior relevant exam: March 2, 2017.    Since prior examination there has been a transmetatarsal  amputation amputation of the mid shafts of all the metatarsals.  Soft tissue drain is noted running from  medial to lateral in the  stump.    Further bony detail obscured by overlying splint, cast.      Impression:       CONCLUSION: As above.    Electronically signed by:  Gatito Cr MD  3/5/2017 11:28 AM CST  Workstation: [id number]    FL C Arm During Surgery [[id number]] Resulted:  03/06/17 0839     Updated:  03/06/17 0839               Consults: Dr. Rosenthal/Donna (DPM)  Consults     Date and Time Order Name Status Description    3/2/2017 1231 Inpatient Consult to Podiatry Completed           Condition on Discharge:  good    Vital Signs  Temp:  [96.4 °F (35.8 °C)-99.3 °F (37.4 °C)] 96.8 °F (36 °C)  Heart Rate:  [71-89] 71  Resp:  [18] 18  BP: (106-113)/(57-71) 107/71    Physical Exam:     General Appearance:    Alert, cooperative, in no acute distress   Head:    Normocephalic, without obvious abnormality, atraumatic   Eyes:            Lids and lashes normal, conjunctivae and sclerae normal, no   icterus, no pallor, corneas clear, PERRLA   Ears:    Ears appear intact with no abnormalities noted   Throat:   No oral lesions, no thrush, oral mucosa moist   Neck:   No adenopathy, supple, trachea midline, no thyromegaly, no   carotid bruit, no JVD   Back:     No kyphosis present, no scoliosis present, no skin lesions,      erythema or scars, no tenderness to percussion or                   palpation,   range of motion normal   Lungs:     Clear to auscultation,respirations regular, even and                  unlabored    Heart:    Regular rhythm and normal rate, normal S1 and S2, no            murmur, no gallop, no rub, no click   Chest Wall:    No abnormalities observed   Abdomen:     Normal bowel sounds, no masses, no organomegaly, soft        non-tender, non-distended, no guarding, no rebound                tenderness   Rectal:     Deferred   Extremities:   Right foot wrapped after TMA, C/D/I   Pulses:   Pulses palpable and equal bilaterally   Skin:   No bleeding, bruising or rash   Lymph nodes:   Small, tender Right groin  Lymph Nodes (see radiology note)   Neurologic:   Cranial nerves 2 - 12 grossly intact, sensation intact, DTR       present and equal bilaterally       Discharge Disposition: good      Discharge Medications   Emre Lisa   Home Medication Instructions JANA:[id number]    Printed on:03/09/17 0546   Medication Information                      aspirin 81 MG chewable tablet  Chew 81 mg Daily.             Cholecalciferol (VITAMIN D3) 5000 UNITS capsule capsule  Take 5,000 Units by mouth Daily.             fluticasone (FLONASE) 50 MCG/ACT nasal spray  INSTILL 2 SPRAYS IN NOSTRILS TWICE DAILY AS NEEDED FOR RHINITIS             FLUVIRIN 0.5 ML suspension prefilled syringe injection               gabapentin (NEURONTIN) 300 MG capsule  Take 300 mg by mouth 4 (Four) Times a Day.             glimepiride (AMARYL) 2 MG tablet  Take 4 mg by mouth 2 (Two) Times a Day.             magnesium oxide (MAGOX) 400 (241.3 MG) MG tablet tablet  TAKE 1 TABLET BY MOUTH 2 (TWO) TIMES DAILY.             metFORMIN (GLUCOPHAGE) 1000 MG tablet  TAKE 1 TABLET BY MOUTH 2 (TWO) TIMES DAILY WITH MEALS. INDICATIONS: TYPE 2 DIABETES MELLITUS             metoprolol tartrate (LOPRESSOR) 25 MG tablet  1/2 tablet twice daily             MULTAQ 400 MG tablet  TAKE 1 TABLET BY MOUTH EVERY DAY             mupirocin (BACTROBAN) 2 % ointment               ONE TOUCH ULTRA TEST test strip  USE TO TEST BLOOD SUGAR THREE TIMES A DAY             valsartan (DIOVAN) 160 MG tablet  0.25 tablet once daily             vitamin C (ASCORBIC ACID) 500 MG tablet  Take 500 mg by mouth Daily.             vitamin E 200 UNIT capsule  Take 200 Units by mouth Daily.                 Discharge Diet: ADA 2000 lina/Heart Healthy    Activity at Discharge: As tolerated,  PT/OT ordered    Follow-up Appointments  Future Appointments  Date Time Provider Department Center   3/13/2017 3:30 PM Marco A Thompson DPM [facility] None          Jamaal Bravo MD  03/09/17  4:59  AM    Time: Discharge 30 total min

## 2017-03-10 ENCOUNTER — LAB REQUISITION (OUTPATIENT)
Dept: LAB | Facility: HOSPITAL | Age: 57
End: 2017-03-10

## 2017-03-10 DIAGNOSIS — E11.22 TYPE 2 DIABETES MELLITUS WITH DIABETIC CHRONIC KIDNEY DISEASE (HCC): ICD-10-CM

## 2017-03-10 LAB
GLUCOSE BLDC GLUCOMTR-MCNC: 127 MG/DL (ref 70–130)
HBA1C MFR BLD: 5.25 % (ref 4–5.6)

## 2017-03-10 PROCEDURE — 83036 HEMOGLOBIN GLYCOSYLATED A1C: CPT | Performed by: FAMILY MEDICINE

## 2017-03-13 ENCOUNTER — OFFICE VISIT (OUTPATIENT)
Dept: PODIATRY | Facility: CLINIC | Age: 57
End: 2017-03-13

## 2017-03-13 VITALS — WEIGHT: 274 LBS | HEIGHT: 76 IN | BODY MASS INDEX: 33.36 KG/M2

## 2017-03-13 DIAGNOSIS — M86.171 ACUTE OSTEOMYELITIS OF RIGHT FOOT (HCC): Primary | ICD-10-CM

## 2017-03-13 DIAGNOSIS — L97.522 CHRONIC ULCER OF GREAT TOE OF LEFT FOOT WITH FAT LAYER EXPOSED (HCC): ICD-10-CM

## 2017-03-13 DIAGNOSIS — L97.512 ULCER OF RIGHT FOOT WITH FAT LAYER EXPOSED (HCC): ICD-10-CM

## 2017-03-13 PROCEDURE — 99024 POSTOP FOLLOW-UP VISIT: CPT | Performed by: PODIATRIST

## 2017-03-13 RX ORDER — CLINDAMYCIN HYDROCHLORIDE 300 MG/1
600 CAPSULE ORAL 3 TIMES DAILY
Qty: 60 CAPSULE | Refills: 0 | Status: SHIPPED | OUTPATIENT
Start: 2017-03-13 | End: 2018-02-09

## 2017-03-13 NOTE — PROGRESS NOTES
Emre Lisa  1960  56 y.o. male     Patient presents today for a post op recheck of his right foot.    03/13/17      Chief Complaint   Patient presents with   • Right Foot - post op recheck           History of Present Illness    Patient presents to clinic today for for his first postoperative visit.  Had transmetatarsal irritation on 3/5/2017.  He is currently nonweightbearing in a posterior mold splint.  He is in no pain.  He is getting around okay with the knee scooter.  He denies nausea, vomiting, fever, chills, night sweats or chest pain.    Past Medical History   Diagnosis Date   • Arthritis    • Diabetes mellitus    • Diabetic foot ulcer associated with type 2 diabetes mellitus      left great toe   • Hypertension          Past Surgical History   Procedure Laterality Date   • Cardiac defibrillator placement  2010   • Toe amputation Right 2016   • Carpal tunnel release  2015     elbow and wrist left arm   • Tonsilectomy, adenoidectomy, bilateral myringotomy and tubes       age 25   • Foot wound closure Right 3/2/2017     Procedure: INCISION AND DRAINAGE, RIGHT FOOT;  Surgeon: Tung Rosenthal DPM;  Location: [facility];  Service:    • Amputation digit Right 3/5/2017     Procedure: RIGHT AMPUTATION TRANSMETATRASAL , RECESSION GASTROCNEMOUS;  Surgeon: Marco A Thompson DPM;  Location: [facility];  Service:          No family history on file.      Social History     Social History   • Marital status:      Spouse name: N/A   • Number of children: N/A   • Years of education: N/A     Occupational History   • Not on file.     Social History Main Topics   • Smoking status: Never Smoker   • Smokeless tobacco: Not on file   • Alcohol use No   • Drug use: No   • Sexual activity: Defer     Other Topics Concern   • Not on file     Social History Narrative         Current Outpatient Prescriptions   Medication Sig Dispense Refill   • aspirin 81 MG chewable tablet Chew 81 mg Daily.     • Cholecalciferol  "(VITAMIN D3) 5000 UNITS capsule capsule Take 5,000 Units by mouth Daily.     • fluticasone (FLONASE) 50 MCG/ACT nasal spray INSTILL 2 SPRAYS IN NOSTRILS TWICE DAILY AS NEEDED FOR RHINITIS  3   • FLUVIRIN 0.5 ML suspension prefilled syringe injection      • gabapentin (NEURONTIN) 300 MG capsule Take 300 mg by mouth 4 (Four) Times a Day.     • glimepiride (AMARYL) 2 MG tablet Take 4 mg by mouth 2 (Two) Times a Day.     • magnesium oxide (MAGOX) 400 (241.3 MG) MG tablet tablet TAKE 1 TABLET BY MOUTH 2 (TWO) TIMES DAILY.  1   • metFORMIN (GLUCOPHAGE) 1000 MG tablet TAKE 1 TABLET BY MOUTH 2 (TWO) TIMES DAILY WITH MEALS. INDICATIONS: TYPE 2 DIABETES MELLITUS  0   • metoprolol tartrate (LOPRESSOR) 25 MG tablet 1/2 tablet twice daily  1   • MULTAQ 400 MG tablet TAKE 1 TABLET BY MOUTH EVERY DAY  1   • mupirocin (BACTROBAN) 2 % ointment      • ONE TOUCH ULTRA TEST test strip USE TO TEST BLOOD SUGAR THREE TIMES A DAY  1   • valsartan (DIOVAN) 160 MG tablet 0.25 tablet once daily  0   • vitamin C (ASCORBIC ACID) 500 MG tablet Take 500 mg by mouth Daily.     • vitamin E 200 UNIT capsule Take 200 Units by mouth Daily.     • clindamycin (CLEOCIN) 300 MG capsule Take 2 capsules by mouth 3 (Three) Times a Day. 60 capsule 0     No current facility-administered medications for this visit.            OBJECTIVE    Visit Vitals   • Ht 76\" (193 cm)   • Wt 274 lb (124 kg)   • BMI 33.35 kg/m2       Review of Systems   Constitutional: Negative for chills and fever.   Cardiovascular: Negative for chest pain.   Gastrointestinal: Negative for constipation, diarrhea, nausea and vomiting.   Skin: ulcer left great toe  Musculoskeletal: negative foot pain      RLE: Incision site to transmetatarsal amputation the left is fairly well approximated.  Incision site is slightly macerated both centrally and laterally.  No signs of dehiscence or noted.  Moderate edema noted.  Mild erythema noted.  No purulent drainage or foul odor noted.  Negative Homans.  " Incision site to gastroc recession is well approximated with no signs of dehiscence or infection noted.    LLE: #1 Ulcer noted to the distal tip of the left hallux. Ulcer measures 0.1 x 0.1 x 0.1 cm. There is a hyperkeratotic rim. Does not probe to bone. No foul odor or purulent drainage noted. No periwound erythema.        Procedures        ASSESSMENT AND PLAN    Emre was seen today for post op recheck.    Diagnoses and all orders for this visit:    Acute osteomyelitis of right foot    Ulcer of right foot with fat layer exposed    Chronic ulcer of great toe of left foot with fat layer exposed    Other orders  -     clindamycin (CLEOCIN) 300 MG capsule; Take 2 capsules by mouth 3 (Three) Times a Day.      - Status post transmetatarsal amputation the right DOS  3/5/2017  - Sterile dressing change performed.  - Rx for clindamycin  - Nonweightbearing to the right lower extremity in boot.  Keep dressing clean dry and intact.  - Left foot ulcer dressed with medihoney and a Band-Aid.  Continue daily dressing changes at home with medihoney and a Band-Aid.  - All questions were answered and the patient is in agreement with the current treatment plan.  - RTC in 1 week              This document has been electronically signed by Marco A Thompson DPM on March 13, 2017 3:58 PM

## 2017-03-20 ENCOUNTER — OFFICE VISIT (OUTPATIENT)
Dept: PODIATRY | Facility: CLINIC | Age: 57
End: 2017-03-20

## 2017-03-20 VITALS — WEIGHT: 274 LBS | HEIGHT: 76 IN | BODY MASS INDEX: 33.36 KG/M2

## 2017-03-20 DIAGNOSIS — L97.512 ULCER OF RIGHT FOOT WITH FAT LAYER EXPOSED (HCC): ICD-10-CM

## 2017-03-20 DIAGNOSIS — M86.171 ACUTE OSTEOMYELITIS OF RIGHT FOOT (HCC): Primary | ICD-10-CM

## 2017-03-20 PROCEDURE — 99024 POSTOP FOLLOW-UP VISIT: CPT | Performed by: PODIATRIST

## 2017-03-20 NOTE — PROGRESS NOTES
Emre Lisa  1960  57 y.o. male     Patient presents today for a post op recheck of his right foot.    03/20/17      Chief Complaint   Patient presents with   • Right Foot - post op recheck           History of Present Illness    Patient presents to clinic today for for his second postoperative visit.  Had transmetatarsal amputation on 3/5/2017.  He is currently nonweightbearing in a cam boot.  He is in no pain.  He is getting around okay with the knee scooter.  He denies nausea, vomiting, fever, chills, night sweats or chest pain.    Past Medical History   Diagnosis Date   • Arthritis    • Diabetes mellitus    • Diabetic foot ulcer associated with type 2 diabetes mellitus      left great toe   • Hypertension          Past Surgical History   Procedure Laterality Date   • Cardiac defibrillator placement  2010   • Toe amputation Right 2016   • Carpal tunnel release  2015     elbow and wrist left arm   • Tonsilectomy, adenoidectomy, bilateral myringotomy and tubes       age 25   • Foot wound closure Right 3/2/2017     Procedure: INCISION AND DRAINAGE, RIGHT FOOT;  Surgeon: Tung Rosenthal DPM;  Location: [facility];  Service:    • Amputation digit Right 3/5/2017     Procedure: RIGHT AMPUTATION TRANSMETATRASAL , RECESSION GASTROCNEMOUS;  Surgeon: Marco A Thompson DPM;  Location: [facility];  Service:          No family history on file.      Social History     Social History   • Marital status:      Spouse name: N/A   • Number of children: N/A   • Years of education: N/A     Occupational History   • Not on file.     Social History Main Topics   • Smoking status: Never Smoker   • Smokeless tobacco: Not on file   • Alcohol use No   • Drug use: No   • Sexual activity: Defer     Other Topics Concern   • Not on file     Social History Narrative         Current Outpatient Prescriptions   Medication Sig Dispense Refill   • aspirin 81 MG chewable tablet Chew 81 mg Daily.     • Cholecalciferol (VITAMIN D3)  "5000 UNITS capsule capsule Take 5,000 Units by mouth Daily.     • clindamycin (CLEOCIN) 300 MG capsule Take 2 capsules by mouth 3 (Three) Times a Day. 60 capsule 0   • fluticasone (FLONASE) 50 MCG/ACT nasal spray INSTILL 2 SPRAYS IN NOSTRILS TWICE DAILY AS NEEDED FOR RHINITIS  3   • FLUVIRIN 0.5 ML suspension prefilled syringe injection      • gabapentin (NEURONTIN) 300 MG capsule Take 300 mg by mouth 4 (Four) Times a Day.     • glimepiride (AMARYL) 2 MG tablet Take 4 mg by mouth 2 (Two) Times a Day.     • magnesium oxide (MAGOX) 400 (241.3 MG) MG tablet tablet TAKE 1 TABLET BY MOUTH 2 (TWO) TIMES DAILY.  1   • metFORMIN (GLUCOPHAGE) 1000 MG tablet TAKE 1 TABLET BY MOUTH 2 (TWO) TIMES DAILY WITH MEALS. INDICATIONS: TYPE 2 DIABETES MELLITUS  0   • metoprolol tartrate (LOPRESSOR) 25 MG tablet 1/2 tablet twice daily  1   • MULTAQ 400 MG tablet TAKE 1 TABLET BY MOUTH EVERY DAY  1   • mupirocin (BACTROBAN) 2 % ointment      • ONE TOUCH ULTRA TEST test strip USE TO TEST BLOOD SUGAR THREE TIMES A DAY  1   • valsartan (DIOVAN) 160 MG tablet 0.25 tablet once daily  0   • vitamin C (ASCORBIC ACID) 500 MG tablet Take 500 mg by mouth Daily.     • vitamin E 200 UNIT capsule Take 200 Units by mouth Daily.       No current facility-administered medications for this visit.            OBJECTIVE    Visit Vitals   • Ht 76\" (193 cm)   • Wt 274 lb (124 kg)   • BMI 33.35 kg/m2       Review of Systems   Constitutional: Negative for chills and fever.   Cardiovascular: Negative for chest pain.   Gastrointestinal: Negative for constipation, diarrhea, nausea and vomiting.   Skin: ulcer left great toe  Musculoskeletal: negative foot pain      RLE: Incision site to transmetatarsal amputation the left is fairly well approximated.  Incision site is slightly macerated centrally, improved laterally  No signs of dehiscence or noted.  Moderate edema noted.  No erythema noted.  No purulent drainage or foul odor noted.  Negative Homans.  Incision site " to gastroc recession is well approximated with no signs of dehiscence or infection noted.    LLE: #1 Ulcer noted to the distal tip of the left hallux. Ulcer measures 0.1 x 0.1 x 0.1 cm. There is a hyperkeratotic rim. Does not probe to bone. No foul odor or purulent drainage noted. No periwound erythema.        Procedures        ASSESSMENT AND PLAN    Emre was seen today for post op recheck.    Diagnoses and all orders for this visit:    Acute osteomyelitis of right foot    Ulcer of right foot with fat layer exposed      - Status post transmetatarsal amputation the right DOS  3/5/2017  - Sterile dressing change performed.  - Nonweightbearing to the right lower extremity in boot.  Keep dressing clean dry and intact.  - continue daily dressing changes to left foot  - All questions were answered and the patient is in agreement with the current treatment plan.  - RTC in 1 week              This document has been electronically signed by Marco A Thompson DPM on March 20, 2017 11:02 AM

## 2017-03-27 ENCOUNTER — OFFICE VISIT (OUTPATIENT)
Dept: PODIATRY | Facility: CLINIC | Age: 57
End: 2017-03-27

## 2017-03-27 VITALS — BODY MASS INDEX: 33.36 KG/M2 | HEIGHT: 76 IN | WEIGHT: 274 LBS

## 2017-03-27 DIAGNOSIS — M86.171 ACUTE OSTEOMYELITIS OF RIGHT FOOT (HCC): ICD-10-CM

## 2017-03-27 DIAGNOSIS — L97.512 ULCER OF RIGHT FOOT WITH FAT LAYER EXPOSED (HCC): Primary | ICD-10-CM

## 2017-03-27 PROCEDURE — 99024 POSTOP FOLLOW-UP VISIT: CPT | Performed by: PODIATRIST

## 2017-03-27 NOTE — PROGRESS NOTES
Emre Lisa  1960  57 y.o. male     Patient presents today for a post op recheck of his right foot.    03/27/17      Chief Complaint   Patient presents with   • Right Foot - Post-op           History of Present Illness    Patient presents to clinic today for for his third postoperative visit.  Had transmetatarsal amputation on 3/5/2017.  He is currently nonweightbearing in a cam boot.  He is in no pain. He denies nausea, vomiting, fever, chills, night sweats or chest pain.    Past Medical History:   Diagnosis Date   • Arthritis    • Diabetes mellitus    • Diabetic foot ulcer associated with type 2 diabetes mellitus     left great toe   • Hypertension          Past Surgical History:   Procedure Laterality Date   • AMPUTATION DIGIT Right 3/5/2017    Procedure: RIGHT AMPUTATION TRANSMETATRASAL , RECESSION GASTROCNEMOUS;  Surgeon: Marco A Thompson DPM;  Location: [facility];  Service:    • CARDIAC DEFIBRILLATOR PLACEMENT  2010   • CARPAL TUNNEL RELEASE  2015    elbow and wrist left arm   • FOOT WOUND CLOSURE Right 3/2/2017    Procedure: INCISION AND DRAINAGE, RIGHT FOOT;  Surgeon: Tung Rosenthal DPM;  Location: [facility];  Service:    • TOE AMPUTATION Right 2016   • TONSILECTOMY, ADENOIDECTOMY, BILATERAL MYRINGOTOMY AND TUBES      age 25         No family history on file.      Social History     Social History   • Marital status:      Spouse name: N/A   • Number of children: N/A   • Years of education: N/A     Occupational History   • Not on file.     Social History Main Topics   • Smoking status: Never Smoker   • Smokeless tobacco: Not on file   • Alcohol use No   • Drug use: No   • Sexual activity: Defer     Other Topics Concern   • Not on file     Social History Narrative         Current Outpatient Prescriptions   Medication Sig Dispense Refill   • aspirin 81 MG chewable tablet Chew 81 mg Daily.     • Cholecalciferol (VITAMIN D3) 5000 UNITS capsule capsule Take 5,000 Units by mouth Daily.     •  "clindamycin (CLEOCIN) 300 MG capsule Take 2 capsules by mouth 3 (Three) Times a Day. 60 capsule 0   • fluticasone (FLONASE) 50 MCG/ACT nasal spray INSTILL 2 SPRAYS IN NOSTRILS TWICE DAILY AS NEEDED FOR RHINITIS  3   • FLUVIRIN 0.5 ML suspension prefilled syringe injection      • gabapentin (NEURONTIN) 300 MG capsule Take 300 mg by mouth 4 (Four) Times a Day.     • glimepiride (AMARYL) 2 MG tablet Take 4 mg by mouth 2 (Two) Times a Day.     • magnesium oxide (MAGOX) 400 (241.3 MG) MG tablet tablet TAKE 1 TABLET BY MOUTH 2 (TWO) TIMES DAILY.  1   • metFORMIN (GLUCOPHAGE) 1000 MG tablet TAKE 1 TABLET BY MOUTH 2 (TWO) TIMES DAILY WITH MEALS. INDICATIONS: TYPE 2 DIABETES MELLITUS  0   • metoprolol tartrate (LOPRESSOR) 25 MG tablet 1/2 tablet twice daily  1   • MULTAQ 400 MG tablet TAKE 1 TABLET BY MOUTH EVERY DAY  1   • mupirocin (BACTROBAN) 2 % ointment      • ONE TOUCH ULTRA TEST test strip USE TO TEST BLOOD SUGAR THREE TIMES A DAY  1   • valsartan (DIOVAN) 160 MG tablet 0.25 tablet once daily  0   • vitamin C (ASCORBIC ACID) 500 MG tablet Take 500 mg by mouth Daily.     • vitamin E 200 UNIT capsule Take 200 Units by mouth Daily.       No current facility-administered medications for this visit.            OBJECTIVE    Ht 76\" (193 cm)  Wt 274 lb (124 kg)  BMI 33.35 kg/m2    Review of Systems   Constitutional: Negative for chills and fever.   Cardiovascular: Negative for chest pain.   Gastrointestinal: Negative for constipation, diarrhea, nausea and vomiting.   Skin: ulcer left great toe  Musculoskeletal: negative foot pain      RLE: Incision site to transmetatarsal amputation the left is fairly well approximated.  Incision site is slightly macerated centrally and dehisced. Probes deep. No purulent drainage   Moderate edema noted.  No erythema noted.  No purulent drainage or foul odor noted.  Negative Homans.  Incision site to gastroc recession is well approximated with no signs of dehiscence or infection " noted.    LLE: #1 Ulcer noted to the distal tip of the left hallux is callused over. There is a hyperkeratotic rim. Does not probe to bone. No foul odor or purulent drainage noted. No periwound erythema.        Procedures        ASSESSMENT AND PLAN    Emre was seen today for post-op.    Diagnoses and all orders for this visit:    Ulcer of right foot with fat layer exposed    Acute osteomyelitis of right foot      - Status post transmetatarsal amputation the right DOS  3/5/2017  - Sterile dressing change performed. Cental area packed with iodoform gauze.  - Nonweightbearing to the right lower extremity in boot.  Keep dressing clean dry and intact.  - continue daily dressing changes to left foot  - All questions were answered and the patient is in agreement with the current treatment plan.  - RTC in 1 week              This document has been electronically signed by Marco A Thompson DPM on March 27, 2017 12:56 PM

## 2017-03-31 ENCOUNTER — OFFICE VISIT (OUTPATIENT)
Dept: PODIATRY | Facility: CLINIC | Age: 57
End: 2017-03-31

## 2017-03-31 VITALS — BODY MASS INDEX: 33.36 KG/M2 | WEIGHT: 274 LBS | HEIGHT: 76 IN

## 2017-03-31 DIAGNOSIS — Z89.431 S/P TRANSMETATARSAL AMPUTATION OF FOOT, RIGHT (HCC): Primary | ICD-10-CM

## 2017-03-31 DIAGNOSIS — M86.171 ACUTE OSTEOMYELITIS OF RIGHT FOOT (HCC): ICD-10-CM

## 2017-03-31 DIAGNOSIS — L97.512 FOOT ULCER, RIGHT, WITH FAT LAYER EXPOSED (HCC): ICD-10-CM

## 2017-03-31 PROCEDURE — 99024 POSTOP FOLLOW-UP VISIT: CPT | Performed by: PODIATRIST

## 2017-04-03 ENCOUNTER — OFFICE VISIT (OUTPATIENT)
Dept: PODIATRY | Facility: CLINIC | Age: 57
End: 2017-04-03

## 2017-04-03 VITALS — HEIGHT: 76 IN | WEIGHT: 274 LBS | BODY MASS INDEX: 33.36 KG/M2

## 2017-04-03 DIAGNOSIS — L97.512 FOOT ULCER, RIGHT, WITH FAT LAYER EXPOSED (HCC): ICD-10-CM

## 2017-04-03 DIAGNOSIS — M86.171 ACUTE OSTEOMYELITIS OF RIGHT FOOT (HCC): ICD-10-CM

## 2017-04-03 DIAGNOSIS — Z89.431 S/P TRANSMETATARSAL AMPUTATION OF FOOT, RIGHT (HCC): Primary | ICD-10-CM

## 2017-04-03 DIAGNOSIS — L97.512 ULCER OF GREAT TOE, RIGHT, WITH FAT LAYER EXPOSED (HCC): ICD-10-CM

## 2017-04-03 PROCEDURE — 99024 POSTOP FOLLOW-UP VISIT: CPT | Performed by: PODIATRIST

## 2017-04-03 NOTE — PROGRESS NOTES
Emre Lisa  1960  57 y.o. male     Patient presents today for post-op follow-up.  BS: 79 this morning.     4/3/17    Chief Complaint   Patient presents with   • Right Foot - Post-op           History of Present Illness    Patient presents to clinic today for his third postoperative visit.  Had transmetatarsal amputation right foot surgery on 3/4/17.  He is currently nonweightbearing in a posterior splint.  His splint is clean dry and intact.    Past Medical History:   Diagnosis Date   • Arthritis    • Diabetes mellitus    • Diabetic foot ulcer associated with type 2 diabetes mellitus     left great toe   • Hypertension          Past Surgical History:   Procedure Laterality Date   • AMPUTATION DIGIT Right 3/5/2017    Procedure: RIGHT AMPUTATION TRANSMETATRASAL , RECESSION GASTROCNEMOUS;  Surgeon: Marco A Thompson DPM;  Location: [facility];  Service:    • CARDIAC DEFIBRILLATOR PLACEMENT  2010   • CARPAL TUNNEL RELEASE  2015    elbow and wrist left arm   • FOOT WOUND CLOSURE Right 3/2/2017    Procedure: INCISION AND DRAINAGE, RIGHT FOOT;  Surgeon: Tung Rosenthal DPM;  Location: [facility];  Service:    • TOE AMPUTATION Right 2016   • TONSILECTOMY, ADENOIDECTOMY, BILATERAL MYRINGOTOMY AND TUBES      age 25         No family history on file.      Social History     Social History   • Marital status:      Spouse name: N/A   • Number of children: N/A   • Years of education: N/A     Occupational History   • Not on file.     Social History Main Topics   • Smoking status: Never Smoker   • Smokeless tobacco: Not on file   • Alcohol use No   • Drug use: No   • Sexual activity: Defer     Other Topics Concern   • Not on file     Social History Narrative         Current Outpatient Prescriptions   Medication Sig Dispense Refill   • aspirin 81 MG chewable tablet Chew 81 mg Daily.     • Cholecalciferol (VITAMIN D3) 5000 UNITS capsule capsule Take 5,000 Units by mouth Daily.     • clindamycin (CLEOCIN) 300 MG  "capsule Take 2 capsules by mouth 3 (Three) Times a Day. 60 capsule 0   • fluticasone (FLONASE) 50 MCG/ACT nasal spray INSTILL 2 SPRAYS IN NOSTRILS TWICE DAILY AS NEEDED FOR RHINITIS  3   • FLUVIRIN 0.5 ML suspension prefilled syringe injection      • gabapentin (NEURONTIN) 300 MG capsule Take 300 mg by mouth 4 (Four) Times a Day.     • glimepiride (AMARYL) 2 MG tablet Take 4 mg by mouth 2 (Two) Times a Day.     • magnesium oxide (MAGOX) 400 (241.3 MG) MG tablet tablet TAKE 1 TABLET BY MOUTH 2 (TWO) TIMES DAILY.  1   • metFORMIN (GLUCOPHAGE) 1000 MG tablet TAKE 1 TABLET BY MOUTH 2 (TWO) TIMES DAILY WITH MEALS. INDICATIONS: TYPE 2 DIABETES MELLITUS  0   • metoprolol tartrate (LOPRESSOR) 25 MG tablet 1/2 tablet twice daily  1   • MULTAQ 400 MG tablet TAKE 1 TABLET BY MOUTH EVERY DAY  1   • mupirocin (BACTROBAN) 2 % ointment      • ONE TOUCH ULTRA TEST test strip USE TO TEST BLOOD SUGAR THREE TIMES A DAY  1   • valsartan (DIOVAN) 160 MG tablet 0.25 tablet once daily  0   • vitamin C (ASCORBIC ACID) 500 MG tablet Take 500 mg by mouth Daily.     • vitamin E 200 UNIT capsule Take 200 Units by mouth Daily.       No current facility-administered medications for this visit.            OBJECTIVE    Ht 76\" (193 cm)  Wt 274 lb (124 kg)  BMI 33.35 kg/m2    Review of Systems   Constitutional: Negative for chills and fever.   Cardiovascular: Negative for chest pain.   Gastrointestinal: Negative for constipation, diarrhea, nausea and vomiting.   Skin: ulcer left great toe  Musculoskeletal: negative foot pain      RLE: Incision site to transmetatarsal amputation is fairly well approximated.  Incision site is slightly macerated centrally and dehisced. Probes deep. No purulent drainage   improved edema noted.  No erythema noted.  No purulent drainage or foul odor noted.  Negative Homans.  Incision site to gastroc recession is well approximated with no signs of dehiscence or infection noted.    LLE: #1 Ulcer noted to the distal tip of " the left hallux is callused over. There is a hyperkeratotic rim. Does not probe to bone. No foul odor or purulent drainage noted. No periwound erythema.        Procedures        ASSESSMENT AND PLAN    Emre was seen today for post-op.    Diagnoses and all orders for this visit:    S/P transmetatarsal amputation of foot, right    Acute osteomyelitis of right foot    Foot ulcer, right, with fat layer exposed    Ulcer of great toe, right, with fat layer exposed        - Skin edges where the incision site is dehisced were roughened up and and reapproximated utilizing 2-0 nylon.  A sterile dressing was applied  - All questions were answered and the patient is in agreement with the current treatment plan.  - Cont NWB in CAM boot  -F/u in 1 week          This document has been electronically signed by Marco A Thompson DPM on April 4, 2017 11:23 AM

## 2017-04-10 ENCOUNTER — OFFICE VISIT (OUTPATIENT)
Dept: PODIATRY | Facility: CLINIC | Age: 57
End: 2017-04-10

## 2017-04-10 VITALS — HEIGHT: 76 IN | WEIGHT: 274 LBS | BODY MASS INDEX: 33.36 KG/M2

## 2017-04-10 DIAGNOSIS — Z89.431 S/P TRANSMETATARSAL AMPUTATION OF FOOT, RIGHT (HCC): Primary | ICD-10-CM

## 2017-04-10 DIAGNOSIS — L97.512 ULCER OF GREAT TOE, RIGHT, WITH FAT LAYER EXPOSED (HCC): ICD-10-CM

## 2017-04-10 DIAGNOSIS — L97.512 FOOT ULCER, RIGHT, WITH FAT LAYER EXPOSED (HCC): ICD-10-CM

## 2017-04-10 PROCEDURE — 99024 POSTOP FOLLOW-UP VISIT: CPT | Performed by: PODIATRIST

## 2017-04-10 NOTE — PROGRESS NOTES
Emre Lisa  1960  57 y.o. male     Patient presents today for post-op follow-up.  BS: 81 this morning.     04/10/17      Chief Complaint   Patient presents with   • Right Foot - Follow-up, Post-op           History of Present Illness    Patient presents to clinic today for his 4th postoperative visit.  Had transmetatarsal amputation right foot surgery on 3/4/17.  He is currently nonweightbearing in a posterior splint.  His splint is clean dry and intact.    Past Medical History:   Diagnosis Date   • Arthritis    • Diabetes mellitus    • Diabetic foot ulcer associated with type 2 diabetes mellitus     left great toe   • Hypertension          Past Surgical History:   Procedure Laterality Date   • AMPUTATION DIGIT Right 3/5/2017    Procedure: RIGHT AMPUTATION TRANSMETATRASAL , RECESSION GASTROCNEMOUS;  Surgeon: Marco A Thompson DPM;  Location: [facility];  Service:    • CARDIAC DEFIBRILLATOR PLACEMENT  2010   • CARPAL TUNNEL RELEASE  2015    elbow and wrist left arm   • FOOT WOUND CLOSURE Right 3/2/2017    Procedure: INCISION AND DRAINAGE, RIGHT FOOT;  Surgeon: Tung Rosenthal DPM;  Location: [facility];  Service:    • TOE AMPUTATION Right 2016   • TONSILECTOMY, ADENOIDECTOMY, BILATERAL MYRINGOTOMY AND TUBES      age 25         No family history on file.      Social History     Social History   • Marital status:      Spouse name: N/A   • Number of children: N/A   • Years of education: N/A     Occupational History   • Not on file.     Social History Main Topics   • Smoking status: Never Smoker   • Smokeless tobacco: Not on file   • Alcohol use No   • Drug use: No   • Sexual activity: Defer     Other Topics Concern   • Not on file     Social History Narrative         Current Outpatient Prescriptions   Medication Sig Dispense Refill   • aspirin 81 MG chewable tablet Chew 81 mg Daily.     • Cholecalciferol (VITAMIN D3) 5000 UNITS capsule capsule Take 5,000 Units by mouth Daily.     • clindamycin  "(CLEOCIN) 300 MG capsule Take 2 capsules by mouth 3 (Three) Times a Day. 60 capsule 0   • fluticasone (FLONASE) 50 MCG/ACT nasal spray INSTILL 2 SPRAYS IN NOSTRILS TWICE DAILY AS NEEDED FOR RHINITIS  3   • FLUVIRIN 0.5 ML suspension prefilled syringe injection      • gabapentin (NEURONTIN) 300 MG capsule Take 300 mg by mouth 4 (Four) Times a Day.     • glimepiride (AMARYL) 2 MG tablet Take 4 mg by mouth 2 (Two) Times a Day.     • magnesium oxide (MAGOX) 400 (241.3 MG) MG tablet tablet TAKE 1 TABLET BY MOUTH 2 (TWO) TIMES DAILY.  1   • metFORMIN (GLUCOPHAGE) 1000 MG tablet TAKE 1 TABLET BY MOUTH 2 (TWO) TIMES DAILY WITH MEALS. INDICATIONS: TYPE 2 DIABETES MELLITUS  0   • metoprolol tartrate (LOPRESSOR) 25 MG tablet 1/2 tablet twice daily  1   • MULTAQ 400 MG tablet TAKE 1 TABLET BY MOUTH EVERY DAY  1   • mupirocin (BACTROBAN) 2 % ointment      • ONE TOUCH ULTRA TEST test strip USE TO TEST BLOOD SUGAR THREE TIMES A DAY  1   • valsartan (DIOVAN) 160 MG tablet 0.25 tablet once daily  0   • vitamin C (ASCORBIC ACID) 500 MG tablet Take 500 mg by mouth Daily.     • vitamin E 200 UNIT capsule Take 200 Units by mouth Daily.       No current facility-administered medications for this visit.            OBJECTIVE    Ht 76\" (193 cm)  Wt 274 lb (124 kg)  BMI 33.35 kg/m2    Review of Systems   Constitutional: Negative for chills and fever.   Cardiovascular: Negative for chest pain.   Gastrointestinal: Negative for constipation, diarrhea, nausea and vomiting.   Skin: ulcer left great toe  Musculoskeletal: negative foot pain      RLE: Incision site to transmetatarsal amputation is well approximated.  No edema noted.  No erythema noted.  No purulent drainage or foul odor noted.  Negative Homans.  Incision site to gastroc recession is slightly macerated. No signs of infection noted.    LLE: #1 Ulcer noted to the distal tip of the left hallux is callused over. There is a hyperkeratotic rim. Does not probe to bone. No foul odor or " purulent drainage noted. No periwound erythema.        Procedures        ASSESSMENT AND PLAN    Emre was seen today for follow-up and post-op.    Diagnoses and all orders for this visit:    S/P transmetatarsal amputation of foot, right    Foot ulcer, right, with fat layer exposed    Ulcer of great toe, right, with fat layer exposed      - medial and lateral sutures removed. Incision site is healed all but a small area centrally  - A sterile dressing was applied  - All questions were answered and the patient is in agreement with the current treatment plan.  - Cont NWB in CAM boot  - F/u in 1 week          This document has been electronically signed by Marco A Thompson DPM on April 10, 2017 10:12 AM

## 2017-04-17 ENCOUNTER — OFFICE VISIT (OUTPATIENT)
Dept: PODIATRY | Facility: CLINIC | Age: 57
End: 2017-04-17

## 2017-04-17 VITALS — WEIGHT: 274 LBS | BODY MASS INDEX: 33.36 KG/M2 | HEIGHT: 76 IN

## 2017-04-17 DIAGNOSIS — Z89.431 S/P TRANSMETATARSAL AMPUTATION OF FOOT, RIGHT (HCC): Primary | ICD-10-CM

## 2017-04-17 PROCEDURE — 99024 POSTOP FOLLOW-UP VISIT: CPT | Performed by: PODIATRIST

## 2017-04-17 NOTE — PROGRESS NOTES
Emre iLsa  1960  57 y.o. male     Patient presents today for post-op follow-up.  BS: 86  this morning.     04/17/17      Chief Complaint   Patient presents with   • Right Foot - Follow-up, Post-op           History of Present Illness    Patient presents to clinic today for his 5th postoperative visit.  Had transmetatarsal amputation right foot surgery on 3/4/17.  He is currently nonweightbearing in a posterior splint.  His splint is clean dry and intact.    Past Medical History:   Diagnosis Date   • Arthritis    • Diabetes mellitus    • Diabetic foot ulcer associated with type 2 diabetes mellitus     left great toe   • Hypertension          Past Surgical History:   Procedure Laterality Date   • AMPUTATION DIGIT Right 3/5/2017    Procedure: RIGHT AMPUTATION TRANSMETATRASAL , RECESSION GASTROCNEMOUS;  Surgeon: Marco A Thompson DPM;  Location: [facility];  Service:    • CARDIAC DEFIBRILLATOR PLACEMENT  2010   • CARPAL TUNNEL RELEASE  2015    elbow and wrist left arm   • FOOT WOUND CLOSURE Right 3/2/2017    Procedure: INCISION AND DRAINAGE, RIGHT FOOT;  Surgeon: Tung Rosenthal DPM;  Location: [facility];  Service:    • TOE AMPUTATION Right 2016   • TONSILECTOMY, ADENOIDECTOMY, BILATERAL MYRINGOTOMY AND TUBES      age 25         No family history on file.      Social History     Social History   • Marital status:      Spouse name: N/A   • Number of children: N/A   • Years of education: N/A     Occupational History   • Not on file.     Social History Main Topics   • Smoking status: Never Smoker   • Smokeless tobacco: Not on file   • Alcohol use No   • Drug use: No   • Sexual activity: Defer     Other Topics Concern   • Not on file     Social History Narrative         Current Outpatient Prescriptions   Medication Sig Dispense Refill   • aspirin 81 MG chewable tablet Chew 81 mg Daily.     • Cholecalciferol (VITAMIN D3) 5000 UNITS capsule capsule Take 5,000 Units by mouth Daily.     • clindamycin  "(CLEOCIN) 300 MG capsule Take 2 capsules by mouth 3 (Three) Times a Day. 60 capsule 0   • fluticasone (FLONASE) 50 MCG/ACT nasal spray INSTILL 2 SPRAYS IN NOSTRILS TWICE DAILY AS NEEDED FOR RHINITIS  3   • FLUVIRIN 0.5 ML suspension prefilled syringe injection      • gabapentin (NEURONTIN) 300 MG capsule Take 300 mg by mouth 4 (Four) Times a Day.     • glimepiride (AMARYL) 2 MG tablet Take 4 mg by mouth 2 (Two) Times a Day.     • magnesium oxide (MAGOX) 400 (241.3 MG) MG tablet tablet TAKE 1 TABLET BY MOUTH 2 (TWO) TIMES DAILY.  1   • metFORMIN (GLUCOPHAGE) 1000 MG tablet TAKE 1 TABLET BY MOUTH 2 (TWO) TIMES DAILY WITH MEALS. INDICATIONS: TYPE 2 DIABETES MELLITUS  0   • metoprolol tartrate (LOPRESSOR) 25 MG tablet 1/2 tablet twice daily  1   • MULTAQ 400 MG tablet TAKE 1 TABLET BY MOUTH EVERY DAY  1   • mupirocin (BACTROBAN) 2 % ointment      • ONE TOUCH ULTRA TEST test strip USE TO TEST BLOOD SUGAR THREE TIMES A DAY  1   • valsartan (DIOVAN) 160 MG tablet 0.25 tablet once daily  0   • vitamin C (ASCORBIC ACID) 500 MG tablet Take 500 mg by mouth Daily.     • vitamin E 200 UNIT capsule Take 200 Units by mouth Daily.       No current facility-administered medications for this visit.            OBJECTIVE    Ht 76\" (193 cm)  Wt 274 lb (124 kg)  BMI 33.35 kg/m2    Review of Systems   Constitutional: Negative for chills and fever.   Cardiovascular: Negative for chest pain.   Gastrointestinal: Negative for constipation, diarrhea, nausea and vomiting.   Skin: ulcer left great toe  Musculoskeletal: negative foot pain      RLE: Incision site to transmetatarsal amputation is well approximated.  No edema noted.  No erythema noted.  No purulent drainage or foul odor noted.  Negative Homans.  Incision site to gastroc recession is slightly macerated but improving. No signs of infection noted.    LLE: #1 Ulcer noted to the distal tip of the left hallux is healed.     Procedures        ASSESSMENT AND PLAN    Emre was seen today " for follow-up and post-op.    Diagnoses and all orders for this visit:    S/P transmetatarsal amputation of foot, right      -  Incision site is healed all but a small area centrally which has improved over last visit  - A sterile dressing was applied  - All questions were answered and the patient is in agreement with the current treatment plan.  - Cont NWB in CAM boot  - F/u in 1 week          This document has been electronically signed by Marco A Thompson DPM on April 17, 2017 1:32 PM

## 2017-04-24 ENCOUNTER — OFFICE VISIT (OUTPATIENT)
Dept: PODIATRY | Facility: CLINIC | Age: 57
End: 2017-04-24

## 2017-04-24 VITALS — WEIGHT: 274 LBS | BODY MASS INDEX: 33.36 KG/M2 | HEIGHT: 76 IN

## 2017-04-24 DIAGNOSIS — Z89.431 S/P TRANSMETATARSAL AMPUTATION OF FOOT, RIGHT (HCC): Primary | ICD-10-CM

## 2017-04-24 PROCEDURE — 99024 POSTOP FOLLOW-UP VISIT: CPT | Performed by: PODIATRIST

## 2017-04-24 NOTE — PROGRESS NOTES
Emre Lisa  1960  57 y.o. male     Patient presents today for post-op follow-up.  BS:82 this morning.     04/24/17      Chief Complaint   Patient presents with   • Right Foot - Follow-up, Post-op           History of Present Illness    Patient presents to clinic today for his  postoperative visit.  Had transmetatarsal amputation right foot surgery on 3/4/17.  He is currently nonweightbearing in a posterior splint.  His splint is clean dry and intact.    Past Medical History:   Diagnosis Date   • Arthritis    • Diabetes mellitus    • Diabetic foot ulcer associated with type 2 diabetes mellitus     left great toe   • Hypertension          Past Surgical History:   Procedure Laterality Date   • AMPUTATION DIGIT Right 3/5/2017    Procedure: RIGHT AMPUTATION TRANSMETATRASAL , RECESSION GASTROCNEMOUS;  Surgeon: Marco A Thompson DPM;  Location: [facility];  Service:    • CARDIAC DEFIBRILLATOR PLACEMENT  2010   • CARPAL TUNNEL RELEASE  2015    elbow and wrist left arm   • FOOT WOUND CLOSURE Right 3/2/2017    Procedure: INCISION AND DRAINAGE, RIGHT FOOT;  Surgeon: Tung Rosenthal DPM;  Location: [facility];  Service:    • TOE AMPUTATION Right 2016   • TONSILECTOMY, ADENOIDECTOMY, BILATERAL MYRINGOTOMY AND TUBES      age 25         No family history on file.      Social History     Social History   • Marital status:      Spouse name: N/A   • Number of children: N/A   • Years of education: N/A     Occupational History   • Not on file.     Social History Main Topics   • Smoking status: Never Smoker   • Smokeless tobacco: Not on file   • Alcohol use No   • Drug use: No   • Sexual activity: Defer     Other Topics Concern   • Not on file     Social History Narrative         Current Outpatient Prescriptions   Medication Sig Dispense Refill   • aspirin 81 MG chewable tablet Chew 81 mg Daily.     • Cholecalciferol (VITAMIN D3) 5000 UNITS capsule capsule Take 5,000 Units by mouth Daily.     • clindamycin (CLEOCIN)  "300 MG capsule Take 2 capsules by mouth 3 (Three) Times a Day. 60 capsule 0   • fluticasone (FLONASE) 50 MCG/ACT nasal spray INSTILL 2 SPRAYS IN NOSTRILS TWICE DAILY AS NEEDED FOR RHINITIS  3   • FLUVIRIN 0.5 ML suspension prefilled syringe injection      • gabapentin (NEURONTIN) 300 MG capsule Take 300 mg by mouth 4 (Four) Times a Day.     • glimepiride (AMARYL) 2 MG tablet Take 4 mg by mouth 2 (Two) Times a Day.     • magnesium oxide (MAGOX) 400 (241.3 MG) MG tablet tablet TAKE 1 TABLET BY MOUTH 2 (TWO) TIMES DAILY.  1   • metFORMIN (GLUCOPHAGE) 1000 MG tablet TAKE 1 TABLET BY MOUTH 2 (TWO) TIMES DAILY WITH MEALS. INDICATIONS: TYPE 2 DIABETES MELLITUS  0   • metoprolol tartrate (LOPRESSOR) 25 MG tablet 1/2 tablet twice daily  1   • MULTAQ 400 MG tablet TAKE 1 TABLET BY MOUTH EVERY DAY  1   • mupirocin (BACTROBAN) 2 % ointment      • ONE TOUCH ULTRA TEST test strip USE TO TEST BLOOD SUGAR THREE TIMES A DAY  1   • valsartan (DIOVAN) 160 MG tablet 0.25 tablet once daily  0   • vitamin C (ASCORBIC ACID) 500 MG tablet Take 500 mg by mouth Daily.     • vitamin E 200 UNIT capsule Take 200 Units by mouth Daily.     • collagenase (SANTYL) 250 UNIT/GM ointment Apply  topically Daily. 90 g 1     No current facility-administered medications for this visit.            OBJECTIVE    Ht 76\" (193 cm)  Wt 274 lb (124 kg)  BMI 33.35 kg/m2    Review of Systems   Constitutional: Negative for chills and fever.   Cardiovascular: Negative for chest pain.   Gastrointestinal: Negative for constipation, diarrhea, nausea and vomiting.   Skin: ulcer left great toe  Musculoskeletal: negative foot pain      RLE: Incision site to transmetatarsal amputation is well approximated.  No edema noted.  No erythema noted.  No purulent drainage or foul odor noted.  Negative Homans.  Incision site to gastroc recession is no longer macerated. Base is fibrotic. No signs of infection noted.    LLE: #1 Ulcer noted to the distal tip of the left hallux is " healed.     Procedures        ASSESSMENT AND PLAN    Emre was seen today for follow-up and post-op.    Diagnoses and all orders for this visit:    S/P transmetatarsal amputation of foot, right    Other orders  -     collagenase (SANTYL) 250 UNIT/GM ointment; Apply  topically Daily.      -  TMA Incision site is nearly healed  - A sterile dressing was applied  - Rx for santyl for gastroc incision site  - All questions were answered and the patient is in agreement with the current treatment plan.  - transition to WBAT in [facility] boot  - F/u in 1 week          This document has been electronically signed by Marco A Thompson DPM on April 24, 2017 11:32 AM

## 2017-05-01 ENCOUNTER — OFFICE VISIT (OUTPATIENT)
Dept: PODIATRY | Facility: CLINIC | Age: 57
End: 2017-05-01

## 2017-05-01 VITALS — WEIGHT: 274 LBS | BODY MASS INDEX: 33.36 KG/M2 | HEIGHT: 76 IN

## 2017-05-01 DIAGNOSIS — Z89.431 S/P TRANSMETATARSAL AMPUTATION OF FOOT, RIGHT (HCC): Primary | ICD-10-CM

## 2017-05-01 PROCEDURE — 99024 POSTOP FOLLOW-UP VISIT: CPT | Performed by: PODIATRIST

## 2017-05-15 ENCOUNTER — OFFICE VISIT (OUTPATIENT)
Dept: PODIATRY | Facility: CLINIC | Age: 57
End: 2017-05-15

## 2017-05-15 VITALS — WEIGHT: 274 LBS | BODY MASS INDEX: 33.36 KG/M2 | HEIGHT: 76 IN

## 2017-05-15 DIAGNOSIS — Z89.431 S/P TRANSMETATARSAL AMPUTATION OF FOOT, RIGHT (HCC): Primary | ICD-10-CM

## 2017-05-15 PROCEDURE — 99024 POSTOP FOLLOW-UP VISIT: CPT | Performed by: PODIATRIST

## 2017-05-30 ENCOUNTER — OFFICE VISIT (OUTPATIENT)
Dept: PODIATRY | Facility: CLINIC | Age: 57
End: 2017-05-30

## 2017-05-30 VITALS — WEIGHT: 274 LBS | BODY MASS INDEX: 33.36 KG/M2 | HEIGHT: 76 IN

## 2017-05-30 DIAGNOSIS — Z89.431 S/P TRANSMETATARSAL AMPUTATION OF FOOT, RIGHT (HCC): Primary | ICD-10-CM

## 2017-05-30 PROCEDURE — 99024 POSTOP FOLLOW-UP VISIT: CPT | Performed by: PODIATRIST

## 2017-05-31 ENCOUNTER — TRANSCRIBE ORDERS (OUTPATIENT)
Dept: LAB | Facility: HOSPITAL | Age: 57
End: 2017-05-31

## 2017-06-28 ENCOUNTER — OFFICE VISIT (OUTPATIENT)
Dept: PODIATRY | Facility: CLINIC | Age: 57
End: 2017-06-28

## 2017-06-28 VITALS — HEIGHT: 76 IN | WEIGHT: 274 LBS | BODY MASS INDEX: 33.36 KG/M2

## 2017-06-28 DIAGNOSIS — M20.42 HAMMER TOE OF LEFT FOOT: ICD-10-CM

## 2017-06-28 DIAGNOSIS — Z89.431 S/P TRANSMETATARSAL AMPUTATION OF FOOT, RIGHT (HCC): Primary | ICD-10-CM

## 2017-06-28 DIAGNOSIS — M20.32 HALLUX HAMMERTOE, LEFT: ICD-10-CM

## 2017-06-28 DIAGNOSIS — E11.42 TYPE 2 DIABETES MELLITUS WITH PERIPHERAL NEUROPATHY (HCC): ICD-10-CM

## 2017-06-28 PROCEDURE — 99212 OFFICE O/P EST SF 10 MIN: CPT | Performed by: PODIATRIST

## 2017-06-28 NOTE — PROGRESS NOTES
Emre Lisa  1960  57 y.o. male     Patient presents today for recheck of his bilateral feet and right lower leg.    06/28/17      Chief Complaint   Patient presents with   • Left Foot - Follow-up   • Right Foot - Follow-up           History of Present Illness    Patient presents to clinic today for Recheck of his transmetatarsal amputation right foot surgery on 3/4/17.  It is completely healed at this time.  He has not obtained his diabetic shoe with forefoot filler yet.  States that it should come next week.  He is currently back to work with no issues.  The ulcers on his left foot remain healed.  He has no new pedal complaints.    Past Medical History:   Diagnosis Date   • Arthritis    • Diabetes mellitus    • Diabetic foot ulcer associated with type 2 diabetes mellitus     left great toe   • Hypertension          Past Surgical History:   Procedure Laterality Date   • AMPUTATION DIGIT Right 3/5/2017    Procedure: RIGHT AMPUTATION TRANSMETATRASAL , RECESSION GASTROCNEMOUS;  Surgeon: Marco A Thompson DPM;  Location: [facility];  Service:    • CARDIAC DEFIBRILLATOR PLACEMENT  2010   • CARPAL TUNNEL RELEASE  2015    elbow and wrist left arm   • FOOT WOUND CLOSURE Right 3/2/2017    Procedure: INCISION AND DRAINAGE, RIGHT FOOT;  Surgeon: Tung Rosenthal DPM;  Location: [facility];  Service:    • TOE AMPUTATION Right 2016   • TONSILECTOMY, ADENOIDECTOMY, BILATERAL MYRINGOTOMY AND TUBES      age 25         No family history on file.      Social History     Social History   • Marital status:      Spouse name: N/A   • Number of children: N/A   • Years of education: N/A     Occupational History   • Not on file.     Social History Main Topics   • Smoking status: Never Smoker   • Smokeless tobacco: Not on file   • Alcohol use No   • Drug use: No   • Sexual activity: Defer     Other Topics Concern   • Not on file     Social History Narrative         Current Outpatient Prescriptions   Medication Sig Dispense  "Refill   • aspirin 81 MG chewable tablet Chew 81 mg Daily.     • Cholecalciferol (VITAMIN D3) 5000 UNITS capsule capsule Take 5,000 Units by mouth Daily.     • clindamycin (CLEOCIN) 300 MG capsule Take 2 capsules by mouth 3 (Three) Times a Day. 60 capsule 0   • collagenase (SANTYL) 250 UNIT/GM ointment Apply  topically Daily. 90 g 1   • fluticasone (FLONASE) 50 MCG/ACT nasal spray INSTILL 2 SPRAYS IN NOSTRILS TWICE DAILY AS NEEDED FOR RHINITIS  3   • FLUVIRIN 0.5 ML suspension prefilled syringe injection      • gabapentin (NEURONTIN) 300 MG capsule Take 300 mg by mouth 4 (Four) Times a Day.     • glimepiride (AMARYL) 2 MG tablet Take 4 mg by mouth 2 (Two) Times a Day.     • magnesium oxide (MAGOX) 400 (241.3 MG) MG tablet tablet TAKE 1 TABLET BY MOUTH 2 (TWO) TIMES DAILY.  1   • metFORMIN (GLUCOPHAGE) 1000 MG tablet TAKE 1 TABLET BY MOUTH 2 (TWO) TIMES DAILY WITH MEALS. INDICATIONS: TYPE 2 DIABETES MELLITUS  0   • metoprolol tartrate (LOPRESSOR) 25 MG tablet 1/2 tablet twice daily  1   • MULTAQ 400 MG tablet TAKE 1 TABLET BY MOUTH EVERY DAY  1   • mupirocin (BACTROBAN) 2 % ointment      • ONE TOUCH ULTRA TEST test strip USE TO TEST BLOOD SUGAR THREE TIMES A DAY  1   • valsartan (DIOVAN) 160 MG tablet 0.25 tablet once daily  0   • vitamin C (ASCORBIC ACID) 500 MG tablet Take 500 mg by mouth Daily.     • vitamin E 200 UNIT capsule Take 200 Units by mouth Daily.       No current facility-administered medications for this visit.            OBJECTIVE    Ht 76\" (193 cm)  Wt 274 lb (124 kg)  BMI 33.35 kg/m2    Review of Systems   Constitutional: Negative for chills and fever.   Cardiovascular: Negative for chest pain.   Gastrointestinal: Negative for constipation, diarrhea, nausea and vomiting.   Skin: ulcer left great toe  Musculoskeletal: negative foot pain      RLE: Incision site to transmetatarsal amputation is healed.  Gastroc incision site is healed. No signs of infection noted.    LLE: #1 Ulcer noted to the distal " tip of the left hallux remains healed  Rigid indra toes noted 2-5 left       Procedures        ASSESSMENT AND PLAN    Emre was seen today for follow-up and follow-up.    Diagnoses and all orders for this visit:    S/P transmetatarsal amputation of foot, right    Hammer toe of left foot    Hallux hammertoe, left    Type 2 diabetes mellitus with peripheral neuropathy      - TMA and gastroc Incision sites are healed  - Continue WBAT in surgical shoe until get new inserts with toe filler from [facility]  - All questions were answered and the patient is in agreement with the current treatment plan.  - F/u in 2 months or sooner should problems arise            This document has been electronically signed by Marco A Thompson DPM on June 28, 2017 12:03 PM     EMR Dragon/Transcription disclaimer:   Much of this encounter note is an electronic transcription/translation of spoken language to printed text. The electronic translation of spoken language may permit erroneous, or at times, nonsensical words or phrases to be inadvertently transcribed; Although I have reviewed the note for such errors, some may still exist.

## 2017-08-28 ENCOUNTER — OFFICE VISIT (OUTPATIENT)
Dept: PODIATRY | Facility: CLINIC | Age: 57
End: 2017-08-28

## 2017-08-28 VITALS — HEIGHT: 76 IN | BODY MASS INDEX: 33.36 KG/M2 | WEIGHT: 274 LBS

## 2017-08-28 DIAGNOSIS — M20.32 HALLUX HAMMERTOE, LEFT: Primary | ICD-10-CM

## 2017-08-28 DIAGNOSIS — E11.42 TYPE 2 DIABETES MELLITUS WITH PERIPHERAL NEUROPATHY (HCC): ICD-10-CM

## 2017-08-28 DIAGNOSIS — B35.1 ONYCHOMYCOSIS: ICD-10-CM

## 2017-08-28 PROCEDURE — 11720 DEBRIDE NAIL 1-5: CPT | Performed by: PODIATRIST

## 2017-08-28 NOTE — PROGRESS NOTES
Emre Lisa  1960  57 y.o. male     Patient presents today for recheck of his bilateral feet.    08/28/17  Chief Complaint   Patient presents with   • Left Foot - Follow-up   • Right Foot - Follow-up           History of Present Illness    Patient presents to clinic today for routine diabetic footcare.  States that the toenails on his left foot have become long and painful.  He describes the pain as dull.  He has his new diabetic shoes.  Has history of transmetatarsal amputation to the right foot back in March of this year.  He has no new pedal complaints.    Past Medical History:   Diagnosis Date   • Arthritis    • Diabetes mellitus    • Diabetic foot ulcer associated with type 2 diabetes mellitus     left great toe   • Hypertension          Past Surgical History:   Procedure Laterality Date   • AMPUTATION DIGIT Right 3/5/2017    Procedure: RIGHT AMPUTATION TRANSMETATRASAL , RECESSION GASTROCNEMOUS;  Surgeon: Marco A Thompson DPM;  Location: [facility];  Service:    • CARDIAC DEFIBRILLATOR PLACEMENT  2010   • CARPAL TUNNEL RELEASE  2015    elbow and wrist left arm   • FOOT WOUND CLOSURE Right 3/2/2017    Procedure: INCISION AND DRAINAGE, RIGHT FOOT;  Surgeon: Tung Rosenthal DPM;  Location: [facility];  Service:    • TOE AMPUTATION Right 2016   • TONSILECTOMY, ADENOIDECTOMY, BILATERAL MYRINGOTOMY AND TUBES      age 25         No family history on file.      Social History     Social History   • Marital status:      Spouse name: N/A   • Number of children: N/A   • Years of education: N/A     Occupational History   • Not on file.     Social History Main Topics   • Smoking status: Never Smoker   • Smokeless tobacco: Not on file   • Alcohol use No   • Drug use: No   • Sexual activity: Defer     Other Topics Concern   • Not on file     Social History Narrative         Current Outpatient Prescriptions   Medication Sig Dispense Refill   • aspirin 81 MG chewable tablet Chew 81 mg Daily.     •  "Cholecalciferol (VITAMIN D3) 5000 UNITS capsule capsule Take 5,000 Units by mouth Daily.     • clindamycin (CLEOCIN) 300 MG capsule Take 2 capsules by mouth 3 (Three) Times a Day. 60 capsule 0   • collagenase (SANTYL) 250 UNIT/GM ointment Apply  topically Daily. 90 g 1   • fluticasone (FLONASE) 50 MCG/ACT nasal spray INSTILL 2 SPRAYS IN NOSTRILS TWICE DAILY AS NEEDED FOR RHINITIS  3   • FLUVIRIN 0.5 ML suspension prefilled syringe injection      • gabapentin (NEURONTIN) 300 MG capsule Take 300 mg by mouth 4 (Four) Times a Day.     • glimepiride (AMARYL) 2 MG tablet Take 4 mg by mouth 2 (Two) Times a Day.     • magnesium oxide (MAGOX) 400 (241.3 MG) MG tablet tablet TAKE 1 TABLET BY MOUTH 2 (TWO) TIMES DAILY.  1   • metFORMIN (GLUCOPHAGE) 1000 MG tablet TAKE 1 TABLET BY MOUTH 2 (TWO) TIMES DAILY WITH MEALS. INDICATIONS: TYPE 2 DIABETES MELLITUS  0   • metoprolol tartrate (LOPRESSOR) 25 MG tablet 1/2 tablet twice daily  1   • MULTAQ 400 MG tablet TAKE 1 TABLET BY MOUTH EVERY DAY  1   • mupirocin (BACTROBAN) 2 % ointment      • ONE TOUCH ULTRA TEST test strip USE TO TEST BLOOD SUGAR THREE TIMES A DAY  1   • valsartan (DIOVAN) 160 MG tablet 0.25 tablet once daily  0   • vitamin C (ASCORBIC ACID) 500 MG tablet Take 500 mg by mouth Daily.     • vitamin E 200 UNIT capsule Take 200 Units by mouth Daily.       No current facility-administered medications for this visit.            OBJECTIVE    Ht 76\" (193 cm)  Wt 274 lb (124 kg)  BMI 33.35 kg/m2    Review of Systems   Constitutional: Negative for chills and fever.   Cardiovascular: Negative for chest pain.   Gastrointestinal: Negative for constipation, diarrhea, nausea and vomiting.   Skin: no open wounds noted. Long painful toenails  Musculoskeletal: negative foot pain    Constitutional: well developed, well nourished    HEENT: Normocephalic and atraumatic, normal hearing    Respiratory: Non labored respirations noted    Cardiovascular:    CFT brisk  to all digits  Skin " temp is warm to warm from proximal tibia to distal digits  Pedal hair growth diminished.   No erythema or edema noted     Musculoskeletal:  TMA noted Right foot  Rigidly contracted left hallux at the IPJ  semi reducibly contracted digits 2-5 left    Dermatological:   Nails 1-5 are thickened, elongated with subungual debris noted left  Skin is warm, dry and intact    Webspaces 1-4 bilateral are clean, dry and intact.   No subcutaneous nodules or masses noted    No open wounds noted     Neurological:   Protective sensation absent  Sensation intact to light touch    DTR intact    Psychiatric: A&O x 3 with normal mood and affect. NAD.       Procedures        ASSESSMENT AND PLAN    Emre was seen today for follow-up and follow-up.    Diagnoses and all orders for this visit:    Hallux hammertoe, left    Type 2 diabetes mellitus with peripheral neuropathy    Onychomycosis    - Nails 1 through 5 left were debrided in length and thickness without incident utilizing nail nippers.  - TMA and gastroc Incision sites are healed  - All questions were answered and the patient is in agreement with the current treatment plan.  - F/u in 3 months or sooner should problems arise            This document has been electronically signed by Marco A Thompson DPM on August 28, 2017 10:24 AM     EMR Dragon/Transcription disclaimer:   Much of this encounter note is an electronic transcription/translation of spoken language to printed text. The electronic translation of spoken language may permit erroneous, or at times, nonsensical words or phrases to be inadvertently transcribed; Although I have reviewed the note for such errors, some may still exist.

## 2017-11-28 ENCOUNTER — OFFICE VISIT (OUTPATIENT)
Dept: PODIATRY | Facility: CLINIC | Age: 57
End: 2017-11-28

## 2017-11-28 VITALS — HEIGHT: 76 IN | WEIGHT: 280 LBS | HEART RATE: 68 BPM | OXYGEN SATURATION: 98 % | BODY MASS INDEX: 34.1 KG/M2

## 2017-11-28 DIAGNOSIS — M20.32 HALLUX MALLEUS OF LEFT FOOT: ICD-10-CM

## 2017-11-28 DIAGNOSIS — M20.42 HAMMER TOE OF LEFT FOOT: Primary | ICD-10-CM

## 2017-11-28 PROCEDURE — 99214 OFFICE O/P EST MOD 30 MIN: CPT | Performed by: PODIATRIST

## 2017-11-28 RX ORDER — CLINDAMYCIN PHOSPHATE 900 MG/50ML
900 INJECTION INTRAVENOUS ONCE
Status: CANCELLED | OUTPATIENT
Start: 2018-02-15 | End: 2018-02-15

## 2017-11-28 NOTE — PROGRESS NOTES
Emre Lisa  1960  57 y.o. male   Bart - 9/26/2017  BS - 92 per pt      11/28/17    Chief Complaint   Patient presents with   • Left Foot - Follow-up   • Right Foot - Follow-up           History of Present Illness    Patient presents to clinic today for follow-up.  He has well-healed transmetatarsal amputation noted to the right lower extremity.  He is ambulating in diabetic shoes with a forefoot filler on the right.  He states the shoe has become slightly loose.  He has an appointment with [facility] for orthotic modification.  He continues to complain of contracted digits on his left foot.  He wishes to pursue surgical intervention after the first of the year.  His blood sugars are well controlled.  He has no other pedal complaints.      Past Medical History:   Diagnosis Date   • Arthritis    • Diabetes mellitus    • Diabetic foot ulcer associated with type 2 diabetes mellitus     left great toe   • Hammer toe    • Hypertension    • Neuropathy in diabetes    • Onychomycosis          Past Surgical History:   Procedure Laterality Date   • AMPUTATION DIGIT Right 3/5/2017    Procedure: RIGHT AMPUTATION TRANSMETATRASAL , RECESSION GASTROCNEMOUS;  Surgeon: Marco A Thompson DPM;  Location: [facility];  Service:    • CARDIAC DEFIBRILLATOR PLACEMENT  2010   • CARPAL TUNNEL RELEASE  2015    elbow and wrist left arm   • FOOT WOUND CLOSURE Right 3/2/2017    Procedure: INCISION AND DRAINAGE, RIGHT FOOT;  Surgeon: Tung Rosenthal DPM;  Location: [facility];  Service:    • TOE AMPUTATION Right 2016   • TONSILECTOMY, ADENOIDECTOMY, BILATERAL MYRINGOTOMY AND TUBES      age 25         History reviewed. No pertinent family history.      Social History     Social History   • Marital status:      Spouse name: N/A   • Number of children: N/A   • Years of education: N/A     Occupational History   • Not on file.     Social History Main Topics   • Smoking status: Never Smoker   • Smokeless tobacco: Never Used   •  "Alcohol use No   • Drug use: No   • Sexual activity: Defer     Other Topics Concern   • Not on file     Social History Narrative         Current Outpatient Prescriptions   Medication Sig Dispense Refill   • aspirin 81 MG chewable tablet Chew 81 mg Daily.     • Cholecalciferol (VITAMIN D3) 5000 UNITS capsule capsule Take 5,000 Units by mouth Daily.     • clindamycin (CLEOCIN) 300 MG capsule Take 2 capsules by mouth 3 (Three) Times a Day. 60 capsule 0   • collagenase (SANTYL) 250 UNIT/GM ointment Apply  topically Daily. 90 g 1   • fluticasone (FLONASE) 50 MCG/ACT nasal spray INSTILL 2 SPRAYS IN NOSTRILS TWICE DAILY AS NEEDED FOR RHINITIS  3   • FLUVIRIN 0.5 ML suspension prefilled syringe injection      • gabapentin (NEURONTIN) 300 MG capsule Take 300 mg by mouth 4 (Four) Times a Day.     • glimepiride (AMARYL) 2 MG tablet Take 4 mg by mouth 2 (Two) Times a Day.     • magnesium oxide (MAGOX) 400 (241.3 MG) MG tablet tablet TAKE 1 TABLET BY MOUTH 2 (TWO) TIMES DAILY.  1   • metFORMIN (GLUCOPHAGE) 1000 MG tablet TAKE 1 TABLET BY MOUTH 2 (TWO) TIMES DAILY WITH MEALS. INDICATIONS: TYPE 2 DIABETES MELLITUS  0   • metoprolol tartrate (LOPRESSOR) 25 MG tablet 1/2 tablet twice daily  1   • MULTAQ 400 MG tablet TAKE 1 TABLET BY MOUTH EVERY DAY  1   • mupirocin (BACTROBAN) 2 % ointment      • ONE TOUCH ULTRA TEST test strip USE TO TEST BLOOD SUGAR THREE TIMES A DAY  1   • valsartan (DIOVAN) 160 MG tablet 0.25 tablet once daily  0   • vitamin C (ASCORBIC ACID) 500 MG tablet Take 500 mg by mouth Daily.     • vitamin E 200 UNIT capsule Take 200 Units by mouth Daily.       No current facility-administered medications for this visit.            OBJECTIVE    Pulse 68  Ht 76\" (193 cm)  Wt 280 lb (127 kg)  SpO2 98%  BMI 34.08 kg/m2    Review of Systems   Constitutional: Negative for chills and fever.   Cardiovascular: Negative for chest pain.   Gastrointestinal: Negative for constipation, diarrhea, nausea and vomiting.   Skin: no " open wounds noted.    Musculoskeletal: negative foot pain    Constitutional: well developed, well nourished    HEENT: Normocephalic and atraumatic, normal hearing    Respiratory: Non labored respirations noted    Cardiovascular:    CFT brisk  to all digits  Skin temp is warm to warm from proximal tibia to distal digits  Pedal hair growth diminished.   No erythema or edema noted     Musculoskeletal:  TMA noted Right foot  Rigidly contracted left hallux at the IPJ  semi reducibly contracted digits 2-5 left    Dermatological:   Nails 1-5 L are thickened   Skin is warm, dry and intact    Webspaces 1-4 bilateral are clean, dry and intact.   No subcutaneous nodules or masses noted    No open wounds noted     Neurological:   Protective sensation absent  Sensation intact to light touch    DTR intact    Psychiatric: A&O x 3 with normal mood and affect. NAD.       Procedures        ASSESSMENT AND PLAN    Emre was seen today for follow-up and follow-up.    Diagnoses and all orders for this visit:    Hammer toe of left foot  -     Case Request; Standing  -     clindamycin (CLEOCIN) 900 mg in dextrose (D5W) 5 % 100 mL IVPB; Infuse 900 mg into a venous catheter 1 (One) Time.  -     Case Request    Hallux malleus of left foot  -     Case Request; Standing  -     clindamycin (CLEOCIN) 900 mg in dextrose (D5W) 5 % 100 mL IVPB; Infuse 900 mg into a venous catheter 1 (One) Time.  -     Case Request    Other orders  -     Follow Anesthesia Guidelines / Standing Orders; Standing  -     Verify NPO Status; Standing  -     Obtain informed consent (if not collected inpatient or PAT); Standing  -     Notify Physician - Standard; Standing    - Discussion was held with patient regarding continued conservative versus surgical options treat his left foot deformities.  Patient has elected for surgical intervention.  Plan will be for IPJ fusion of the hallux and hammertoe correction 2, 3 and 4 on the left foot.  He would like to schedule the  surgery for after the first of the year.  - All questions were answered   - Return to the clinic first of the year for preoperative visit.            This document has been electronically signed by Marco A Thompson DPM on November 29, 2017 6:57 AM

## 2018-01-02 ENCOUNTER — OFFICE VISIT (OUTPATIENT)
Dept: PODIATRY | Facility: CLINIC | Age: 58
End: 2018-01-02

## 2018-01-02 VITALS — WEIGHT: 280 LBS | HEIGHT: 76 IN | BODY MASS INDEX: 34.1 KG/M2

## 2018-01-02 DIAGNOSIS — M20.42 HAMMER TOE OF LEFT FOOT: Primary | ICD-10-CM

## 2018-01-02 DIAGNOSIS — E11.42 TYPE 2 DIABETES MELLITUS WITH PERIPHERAL NEUROPATHY (HCC): ICD-10-CM

## 2018-01-02 DIAGNOSIS — Z89.431 S/P TRANSMETATARSAL AMPUTATION OF FOOT, RIGHT (HCC): ICD-10-CM

## 2018-01-02 DIAGNOSIS — M20.32 HALLUX MALLEUS OF LEFT FOOT: ICD-10-CM

## 2018-01-02 PROCEDURE — 99213 OFFICE O/P EST LOW 20 MIN: CPT | Performed by: PODIATRIST

## 2018-01-02 NOTE — PROGRESS NOTES
Emre Lisa  1960  57 y.o. male   Bart - 9/26/2017  BS - 85 per pt  Patient presents today for bilateral foot follow-up.  Patient states that he may have to post-pone surgery.       01/02/18      Chief Complaint   Patient presents with   • Left Foot - Follow-up   • Right Foot - Follow-up           History of Present Illness    Patient presents to clinic today for follow-up.  He is doing well. States that he needs to post-pone his surgery until the second week in February. He states that the callus on the top of his left 4th toe is getting worse. He needs new toe sleeves. His last glucose reading was 85 mg/dL.       Past Medical History:   Diagnosis Date   • Arthritis    • Diabetes mellitus    • Diabetic foot ulcer associated with type 2 diabetes mellitus     left great toe   • Hammer toe    • Hypertension    • Neuropathy in diabetes    • Onychomycosis          Past Surgical History:   Procedure Laterality Date   • AMPUTATION DIGIT Right 3/5/2017    Procedure: RIGHT AMPUTATION TRANSMETATRASAL , RECESSION GASTROCNEMOUS;  Surgeon: Marco A Thompson DPM;  Location: [facility];  Service:    • CARDIAC DEFIBRILLATOR PLACEMENT  2010   • CARPAL TUNNEL RELEASE  2015    elbow and wrist left arm   • FOOT WOUND CLOSURE Right 3/2/2017    Procedure: INCISION AND DRAINAGE, RIGHT FOOT;  Surgeon: Tung Rosenthal DPM;  Location: [facility];  Service:    • TOE AMPUTATION Right 2016   • TONSILECTOMY, ADENOIDECTOMY, BILATERAL MYRINGOTOMY AND TUBES      age 25         Family History   Problem Relation Age of Onset   • Diabetes Father    • Stroke Father          Social History     Social History   • Marital status:      Spouse name: N/A   • Number of children: N/A   • Years of education: N/A     Occupational History   • Not on file.     Social History Main Topics   • Smoking status: Never Smoker   • Smokeless tobacco: Never Used   • Alcohol use No   • Drug use: No   • Sexual activity: Defer     Other Topics Concern   •  "Not on file     Social History Narrative         Current Outpatient Prescriptions   Medication Sig Dispense Refill   • aspirin 81 MG chewable tablet Chew 81 mg Daily.     • Cholecalciferol (VITAMIN D3) 5000 UNITS capsule capsule Take 5,000 Units by mouth Daily.     • clindamycin (CLEOCIN) 300 MG capsule Take 2 capsules by mouth 3 (Three) Times a Day. 60 capsule 0   • collagenase (SANTYL) 250 UNIT/GM ointment Apply  topically Daily. 90 g 1   • fluticasone (FLONASE) 50 MCG/ACT nasal spray INSTILL 2 SPRAYS IN NOSTRILS TWICE DAILY AS NEEDED FOR RHINITIS  3   • FLUVIRIN 0.5 ML suspension prefilled syringe injection      • gabapentin (NEURONTIN) 300 MG capsule Take 300 mg by mouth 4 (Four) Times a Day.     • glimepiride (AMARYL) 2 MG tablet Take 4 mg by mouth 2 (Two) Times a Day.     • magnesium oxide (MAGOX) 400 (241.3 MG) MG tablet tablet TAKE 1 TABLET BY MOUTH 2 (TWO) TIMES DAILY.  1   • metFORMIN (GLUCOPHAGE) 1000 MG tablet TAKE 1 TABLET BY MOUTH 2 (TWO) TIMES DAILY WITH MEALS. INDICATIONS: TYPE 2 DIABETES MELLITUS  0   • metoprolol tartrate (LOPRESSOR) 25 MG tablet 1/2 tablet twice daily  1   • MULTAQ 400 MG tablet TAKE 1 TABLET BY MOUTH EVERY DAY  1   • mupirocin (BACTROBAN) 2 % ointment      • ONE TOUCH ULTRA TEST test strip USE TO TEST BLOOD SUGAR THREE TIMES A DAY  1   • valsartan (DIOVAN) 160 MG tablet 0.25 tablet once daily  0   • vitamin C (ASCORBIC ACID) 500 MG tablet Take 500 mg by mouth Daily.     • vitamin E 200 UNIT capsule Take 200 Units by mouth Daily.       No current facility-administered medications for this visit.            OBJECTIVE    Ht 193 cm (76\")  Wt 127 kg (280 lb)  BMI 34.08 kg/m2    Review of Systems   Constitutional: Negative for chills and fever.   Cardiovascular: Negative for chest pain.   Gastrointestinal: Negative for constipation, diarrhea, nausea and vomiting.   Skin: no open wounds noted.    Musculoskeletal: negative foot pain    Constitutional: well developed, well " nourished    HEENT: Normocephalic and atraumatic, normal hearing    Respiratory: Non labored respirations noted    Cardiovascular:    CFT brisk  to all digits  Skin temp is warm to warm from proximal tibia to distal digits  Pedal hair growth diminished.   No erythema or edema noted     Musculoskeletal:  TMA noted Right foot  Rigidly contracted left hallux at the IPJ  semi reducibly contracted digits 2-5 left    Dermatological:   Nails 1-5 L are thickened   Skin is warm, dry and intact    Webspaces 1-4 bilateral are clean, dry and intact.   No subcutaneous nodules or masses noted    No open wounds noted   HPK tissue dorsal left 4th digit    Neurological:   Protective sensation absent  Sensation intact to light touch    DTR intact    Psychiatric: A&O x 3 with normal mood and affect. NAD.       Procedures        ASSESSMENT AND PLAN    Emre was seen today for follow-up and follow-up.    Diagnoses and all orders for this visit:    Hammer toe of left foot    Hallux malleus of left foot    Type 2 diabetes mellitus with peripheral neuropathy    S/P transmetatarsal amputation of foot, right    - pt is doing well  - dispensed new toe sleeves  - will post-pone surgery until 2/15/18  - All questions were answered   - Return to the clinic first week of February for surgical scheduling.             This document has been electronically signed by Marco A Thompson DPM on January 2, 2018 9:14 AM

## 2018-02-06 ENCOUNTER — OFFICE VISIT (OUTPATIENT)
Dept: PODIATRY | Facility: CLINIC | Age: 58
End: 2018-02-06

## 2018-02-06 VITALS — HEIGHT: 76 IN | WEIGHT: 279.98 LBS | BODY MASS INDEX: 34.09 KG/M2

## 2018-02-06 DIAGNOSIS — E11.42 TYPE 2 DIABETES MELLITUS WITH PERIPHERAL NEUROPATHY (HCC): ICD-10-CM

## 2018-02-06 DIAGNOSIS — M20.32 HALLUX MALLEUS OF LEFT FOOT: ICD-10-CM

## 2018-02-06 DIAGNOSIS — M20.42 HAMMER TOE OF LEFT FOOT: Primary | ICD-10-CM

## 2018-02-06 DIAGNOSIS — Z89.431 S/P TRANSMETATARSAL AMPUTATION OF FOOT, RIGHT (HCC): ICD-10-CM

## 2018-02-06 PROCEDURE — 99214 OFFICE O/P EST MOD 30 MIN: CPT | Performed by: PODIATRIST

## 2018-02-06 NOTE — PROGRESS NOTES
Emre Lisa  1960  57 y.o. male   PCP-Bart   BS - 89 per pt  Patient is here to discuss postponing surgery for his left foot hammertoes.      02/06/18      Chief Complaint   Patient presents with   • Left Foot - Follow-up           History of Present Illness    Patient presents to clinic today for follow-up.  He is doing well. He is ready to proceed with surgery. He has no new pedal complaints.     Past Medical History:   Diagnosis Date   • Arthritis    • Diabetes mellitus    • Diabetic foot ulcer associated with type 2 diabetes mellitus     left great toe   • Hammer toe    • Hypertension    • Neuropathy in diabetes    • Onychomycosis          Past Surgical History:   Procedure Laterality Date   • AMPUTATION DIGIT Right 3/5/2017    Procedure: RIGHT AMPUTATION TRANSMETATRASAL , RECESSION GASTROCNEMOUS;  Surgeon: Marco A Thompson DPM;  Location: [facility];  Service:    • CARDIAC DEFIBRILLATOR PLACEMENT  2010   • CARPAL TUNNEL RELEASE  2015    elbow and wrist left arm   • FOOT WOUND CLOSURE Right 3/2/2017    Procedure: INCISION AND DRAINAGE, RIGHT FOOT;  Surgeon: Tung Rosenthal DPM;  Location: [facility];  Service:    • TOE AMPUTATION Right 2016   • TONSILECTOMY, ADENOIDECTOMY, BILATERAL MYRINGOTOMY AND TUBES      age 25         Family History   Problem Relation Age of Onset   • Diabetes Father    • Stroke Father    • No Known Problems Maternal Grandmother    • No Known Problems Maternal Grandfather    • No Known Problems Paternal Grandmother    • No Known Problems Paternal Grandfather    • No Known Problems Son    • No Known Problems Daughter    • No Known Problems Daughter          Social History     Social History   • Marital status:      Spouse name: N/A   • Number of children: N/A   • Years of education: N/A     Occupational History   • Not on file.     Social History Main Topics   • Smoking status: Never Smoker   • Smokeless tobacco: Never Used   • Alcohol use No   • Drug use: No   •  "Sexual activity: Defer     Other Topics Concern   • Not on file     Social History Narrative         Current Outpatient Prescriptions   Medication Sig Dispense Refill   • aspirin 81 MG chewable tablet Chew 81 mg Daily.     • Cholecalciferol (VITAMIN D3) 5000 UNITS capsule capsule Take 5,000 Units by mouth Daily.     • clindamycin (CLEOCIN) 300 MG capsule Take 2 capsules by mouth 3 (Three) Times a Day. 60 capsule 0   • collagenase (SANTYL) 250 UNIT/GM ointment Apply  topically Daily. 90 g 1   • fluticasone (FLONASE) 50 MCG/ACT nasal spray INSTILL 2 SPRAYS IN NOSTRILS TWICE DAILY AS NEEDED FOR RHINITIS  3   • FLUVIRIN 0.5 ML suspension prefilled syringe injection      • gabapentin (NEURONTIN) 300 MG capsule Take 300 mg by mouth 4 (Four) Times a Day.     • glimepiride (AMARYL) 2 MG tablet Take 4 mg by mouth 2 (Two) Times a Day.     • magnesium oxide (MAGOX) 400 (241.3 MG) MG tablet tablet TAKE 1 TABLET BY MOUTH 2 (TWO) TIMES DAILY.  1   • metFORMIN (GLUCOPHAGE) 1000 MG tablet TAKE 1 TABLET BY MOUTH 2 (TWO) TIMES DAILY WITH MEALS. INDICATIONS: TYPE 2 DIABETES MELLITUS  0   • metoprolol tartrate (LOPRESSOR) 25 MG tablet 1/2 tablet twice daily  1   • MULTAQ 400 MG tablet TAKE 1 TABLET BY MOUTH EVERY DAY  1   • mupirocin (BACTROBAN) 2 % ointment      • ONE TOUCH ULTRA TEST test strip USE TO TEST BLOOD SUGAR THREE TIMES A DAY  1   • valsartan (DIOVAN) 160 MG tablet 0.25 tablet once daily  0   • vitamin C (ASCORBIC ACID) 500 MG tablet Take 500 mg by mouth Daily.     • vitamin E 200 UNIT capsule Take 200 Units by mouth Daily.       No current facility-administered medications for this visit.            OBJECTIVE    Ht 193 cm (75.98\")  Wt 127 kg (279 lb 15.8 oz)  BMI 34.1 kg/m2    Review of Systems   Constitutional: Negative for chills and fever.   Cardiovascular: Negative for chest pain.   Gastrointestinal: Negative for constipation, diarrhea, nausea and vomiting.   Skin: no open wounds noted.    Musculoskeletal: negative " foot pain    Constitutional: well developed, well nourished    HEENT: Normocephalic and atraumatic, normal hearing    Respiratory: Non labored respirations noted    Cardiovascular:    CFT brisk  to all digits  Skin temp is warm to warm from proximal tibia to distal digits  Pedal hair growth diminished.   No erythema or edema noted     Musculoskeletal:  TMA noted Right foot  Rigidly contracted left hallux at the IPJ  semi reducibly contracted digits 2-5 left    Dermatological:   Nails 1-5 L are thickened   Skin is warm, dry and intact    Webspaces 1-4 bilateral are clean, dry and intact.   No subcutaneous nodules or masses noted    No open wounds noted   HPK tissue dorsal left 4th digit    Neurological:   Protective sensation absent  Sensation intact to light touch    DTR intact    Psychiatric: A&O x 3 with normal mood and affect. NAD.       Procedures        ASSESSMENT AND PLAN    Emre was seen today for follow-up.    Diagnoses and all orders for this visit:    Hammer toe of left foot    Hallux malleus of left foot    Type 2 diabetes mellitus with peripheral neuropathy    S/P transmetatarsal amputation of foot, right    - Lengthy discussion held with patient regarding surgical intervention for the left foot.  Risks and benefits were discussed in detail.  No guarantees were given or implied.  Plan will be for left hallux IPJ fusion and hammertoe correction 2 through 5.   - All questions were answered   - Return to the clinic after surgery          This document has been electronically signed by Marco A Thompson DPM on February 6, 2018 12:26 PM

## 2018-02-09 ENCOUNTER — APPOINTMENT (OUTPATIENT)
Dept: PREADMISSION TESTING | Facility: HOSPITAL | Age: 58
End: 2018-02-09

## 2018-02-09 VITALS
BODY MASS INDEX: 35.68 KG/M2 | HEART RATE: 84 BPM | SYSTOLIC BLOOD PRESSURE: 116 MMHG | WEIGHT: 293 LBS | RESPIRATION RATE: 16 BRPM | HEIGHT: 76 IN | OXYGEN SATURATION: 97 % | DIASTOLIC BLOOD PRESSURE: 62 MMHG

## 2018-02-09 LAB
ANION GAP SERPL CALCULATED.3IONS-SCNC: 12 MMOL/L (ref 5–15)
BUN BLD-MCNC: 35 MG/DL (ref 7–21)
BUN/CREAT SERPL: 36.1 (ref 7–25)
CALCIUM SPEC-SCNC: 9.3 MG/DL (ref 8.4–10.2)
CHLORIDE SERPL-SCNC: 102 MMOL/L (ref 95–110)
CO2 SERPL-SCNC: 26 MMOL/L (ref 22–31)
CREAT BLD-MCNC: 0.97 MG/DL (ref 0.7–1.3)
GFR SERPL CREATININE-BSD FRML MDRD: 80 ML/MIN/1.73 (ref 60–130)
GLUCOSE BLD-MCNC: 97 MG/DL (ref 60–100)
POTASSIUM BLD-SCNC: 4.6 MMOL/L (ref 3.5–5.1)
SODIUM BLD-SCNC: 140 MMOL/L (ref 137–145)

## 2018-02-09 PROCEDURE — 93005 ELECTROCARDIOGRAM TRACING: CPT

## 2018-02-09 PROCEDURE — 36415 COLL VENOUS BLD VENIPUNCTURE: CPT

## 2018-02-09 PROCEDURE — 80048 BASIC METABOLIC PNL TOTAL CA: CPT | Performed by: ANESTHESIOLOGY

## 2018-02-09 PROCEDURE — 93010 ELECTROCARDIOGRAM REPORT: CPT | Performed by: INTERNAL MEDICINE

## 2018-02-09 RX ORDER — VALSARTAN 160 MG/1
40 TABLET ORAL NIGHTLY
COMMUNITY
End: 2019-11-11

## 2018-02-09 RX ORDER — FLUTICASONE PROPIONATE 50 MCG
2 SPRAY, SUSPENSION (ML) NASAL AS NEEDED
COMMUNITY

## 2018-02-09 RX ORDER — SODIUM CHLORIDE 9 MG/ML
1000 INJECTION, SOLUTION INTRAVENOUS CONTINUOUS PRN
Status: CANCELLED | OUTPATIENT
Start: 2018-02-15

## 2018-02-09 NOTE — DISCHARGE INSTRUCTIONS
[facility]  Pre-op Information and Guidelines    You will be called after 2 p.m. the day before your surgery (Friday for Monday surgery) and notified of your time for arrival and approximate surgery time.  If you have not received a call by 4P.M., please contact Same Day Surgery at [phone] of if outside [facility] call [phone].    Please Follow these Important Safety Guidelines:    • The morning of your procedure, take only the medications listed below with   A sip of water:_____________________________________________       ___FLONASE ( IF NEEDED)_____________________    • DO NOT eat or drink anything after 12:00 midnight the night before surgery  Specific instructions concerning drinking clear liquids will be discussed during  the pre-surgery instruction call the day before your surgery.    • If you take a blood thinner (ex. Plavix, Coumadin, aspirin), ask your doctor when to stop it before surgery  STOP DATE: _________________    • Only 2 visitors are allowed in patient rooms at a time  Your visitors will be asked to wait in the lobby until the admission process is complete with the exception of a parent with a child and patients in need of special assistance.    • YOU CANNOT DRIVE YOURSELF HOME  You must be accompanied by someone who will be responsible for driving you home after surgery and for your care at home.    • DO NOT chew gum, use breath mints, hard candy, or smoke the day of surgery  • DO NOT drink alcohol for at least 24 hours before your surgery  • DO NOT wear any jewelry and remove all body piercing before coming to the hospital  • DO NOT wear make-up to the hospital  • If you are having surgery on an extremity (arm/leg/foot) remove nail polish/artificial nails on the surgical side  • Clothing, glasses, contacts, dentures, and hairpieces must be removed before surgery  • Bathe the night before or the morning of your surgery and do not use powders/lotions on  skin.

## 2018-02-14 ENCOUNTER — ANESTHESIA EVENT (OUTPATIENT)
Dept: PERIOP | Facility: HOSPITAL | Age: 58
End: 2018-02-14

## 2018-02-15 ENCOUNTER — HOSPITAL ENCOUNTER (OUTPATIENT)
Facility: HOSPITAL | Age: 58
Setting detail: HOSPITAL OUTPATIENT SURGERY
Discharge: HOME | End: 2018-02-15
Attending: PODIATRIST | Admitting: PODIATRIST

## 2018-02-15 ENCOUNTER — APPOINTMENT (OUTPATIENT)
Dept: GENERAL RADIOLOGY | Facility: HOSPITAL | Age: 58
End: 2018-02-15

## 2018-02-15 ENCOUNTER — ANESTHESIA (OUTPATIENT)
Dept: PERIOP | Facility: HOSPITAL | Age: 58
End: 2018-02-15

## 2018-02-15 VITALS
BODY MASS INDEX: 35.3 KG/M2 | HEIGHT: 76 IN | DIASTOLIC BLOOD PRESSURE: 68 MMHG | WEIGHT: 289.9 LBS | HEART RATE: 83 BPM | SYSTOLIC BLOOD PRESSURE: 118 MMHG | RESPIRATION RATE: 18 BRPM | TEMPERATURE: 97.4 F | OXYGEN SATURATION: 97 %

## 2018-02-15 DIAGNOSIS — M20.42 HAMMER TOE OF LEFT FOOT: ICD-10-CM

## 2018-02-15 DIAGNOSIS — M20.32 HALLUX MALLEUS OF LEFT FOOT: ICD-10-CM

## 2018-02-15 LAB
GLUCOSE BLDC GLUCOMTR-MCNC: 85 MG/DL (ref 70–130)
GLUCOSE BLDC GLUCOMTR-MCNC: 89 MG/DL (ref 70–130)

## 2018-02-15 PROCEDURE — 25010000002 ONDANSETRON PER 1 MG: Performed by: NURSE ANESTHETIST, CERTIFIED REGISTERED

## 2018-02-15 PROCEDURE — C1713 ANCHOR/SCREW BN/BN,TIS/BN: HCPCS | Performed by: PODIATRIST

## 2018-02-15 PROCEDURE — 25010000002 SUCCINYLCHOLINE PER 20 MG: Performed by: NURSE ANESTHETIST, CERTIFIED REGISTERED

## 2018-02-15 PROCEDURE — 25010000002 PHENYLEPHRINE PER 1 ML: Performed by: NURSE ANESTHETIST, CERTIFIED REGISTERED

## 2018-02-15 PROCEDURE — 25010000002 PROPOFOL 10 MG/ML EMULSION: Performed by: NURSE ANESTHETIST, CERTIFIED REGISTERED

## 2018-02-15 PROCEDURE — 25010000002 DEXAMETHASONE PER 1 MG: Performed by: NURSE ANESTHETIST, CERTIFIED REGISTERED

## 2018-02-15 PROCEDURE — 82962 GLUCOSE BLOOD TEST: CPT

## 2018-02-15 PROCEDURE — L4361 PNEUMA/VAC WALK BOOT PRE OTS: HCPCS | Performed by: PODIATRIST

## 2018-02-15 PROCEDURE — 76000 FLUOROSCOPY <1 HR PHYS/QHP: CPT

## 2018-02-15 PROCEDURE — 25010000002 FENTANYL CITRATE (PF) 100 MCG/2ML SOLUTION: Performed by: NURSE ANESTHETIST, CERTIFIED REGISTERED

## 2018-02-15 PROCEDURE — 28285 REPAIR OF HAMMERTOE: CPT | Performed by: PODIATRIST

## 2018-02-15 PROCEDURE — 28755 FUSION OF BIG TOE JOINT: CPT | Performed by: PODIATRIST

## 2018-02-15 DEVICE — CANNULATED SCREW
Type: IMPLANTABLE DEVICE | Site: TOES | Status: FUNCTIONAL
Brand: ASNIS

## 2018-02-15 RX ORDER — EPHEDRINE SULFATE 50 MG/ML
5 INJECTION, SOLUTION INTRAVENOUS ONCE AS NEEDED
Status: DISCONTINUED | OUTPATIENT
Start: 2018-02-15 | End: 2018-02-15 | Stop reason: HOSPADM

## 2018-02-15 RX ORDER — GLYCOPYRROLATE 0.2 MG/ML
INJECTION INTRAMUSCULAR; INTRAVENOUS AS NEEDED
Status: DISCONTINUED | OUTPATIENT
Start: 2018-02-15 | End: 2018-02-15 | Stop reason: SURG

## 2018-02-15 RX ORDER — DIPHENHYDRAMINE HYDROCHLORIDE 50 MG/ML
12.5 INJECTION INTRAMUSCULAR; INTRAVENOUS
Status: DISCONTINUED | OUTPATIENT
Start: 2018-02-15 | End: 2018-02-15 | Stop reason: HOSPADM

## 2018-02-15 RX ORDER — MEPERIDINE HYDROCHLORIDE 50 MG/ML
12.5 INJECTION INTRAMUSCULAR; INTRAVENOUS; SUBCUTANEOUS
Status: DISCONTINUED | OUTPATIENT
Start: 2018-02-15 | End: 2018-02-15 | Stop reason: HOSPADM

## 2018-02-15 RX ORDER — SODIUM CHLORIDE 9 MG/ML
1000 INJECTION, SOLUTION INTRAVENOUS CONTINUOUS PRN
Status: DISCONTINUED | OUTPATIENT
Start: 2018-02-15 | End: 2018-02-15 | Stop reason: HOSPADM

## 2018-02-15 RX ORDER — DEXAMETHASONE SODIUM PHOSPHATE 4 MG/ML
INJECTION, SOLUTION INTRA-ARTICULAR; INTRALESIONAL; INTRAMUSCULAR; INTRAVENOUS; SOFT TISSUE AS NEEDED
Status: DISCONTINUED | OUTPATIENT
Start: 2018-02-15 | End: 2018-02-15 | Stop reason: SURG

## 2018-02-15 RX ORDER — SODIUM CHLORIDE, SODIUM GLUCONATE, SODIUM ACETATE, POTASSIUM CHLORIDE, AND MAGNESIUM CHLORIDE 526; 502; 368; 37; 30 MG/100ML; MG/100ML; MG/100ML; MG/100ML; MG/100ML
INJECTION, SOLUTION INTRAVENOUS CONTINUOUS PRN
Status: DISCONTINUED | OUTPATIENT
Start: 2018-02-15 | End: 2018-02-15 | Stop reason: SURG

## 2018-02-15 RX ORDER — NALOXONE HCL 0.4 MG/ML
0.2 VIAL (ML) INJECTION AS NEEDED
Status: DISCONTINUED | OUTPATIENT
Start: 2018-02-15 | End: 2018-02-15 | Stop reason: HOSPADM

## 2018-02-15 RX ORDER — ONDANSETRON 2 MG/ML
INJECTION INTRAMUSCULAR; INTRAVENOUS AS NEEDED
Status: DISCONTINUED | OUTPATIENT
Start: 2018-02-15 | End: 2018-02-15 | Stop reason: SURG

## 2018-02-15 RX ORDER — ONDANSETRON 2 MG/ML
4 INJECTION INTRAMUSCULAR; INTRAVENOUS ONCE AS NEEDED
Status: DISCONTINUED | OUTPATIENT
Start: 2018-02-15 | End: 2018-02-15 | Stop reason: HOSPADM

## 2018-02-15 RX ORDER — FENTANYL CITRATE 50 UG/ML
INJECTION, SOLUTION INTRAMUSCULAR; INTRAVENOUS AS NEEDED
Status: DISCONTINUED | OUTPATIENT
Start: 2018-02-15 | End: 2018-02-15 | Stop reason: SURG

## 2018-02-15 RX ORDER — FLUMAZENIL 0.1 MG/ML
0.2 INJECTION INTRAVENOUS AS NEEDED
Status: DISCONTINUED | OUTPATIENT
Start: 2018-02-15 | End: 2018-02-15 | Stop reason: HOSPADM

## 2018-02-15 RX ORDER — PROPOFOL 10 MG/ML
VIAL (ML) INTRAVENOUS AS NEEDED
Status: DISCONTINUED | OUTPATIENT
Start: 2018-02-15 | End: 2018-02-15 | Stop reason: SURG

## 2018-02-15 RX ORDER — ACETAMINOPHEN 325 MG/1
650 TABLET ORAL ONCE AS NEEDED
Status: DISCONTINUED | OUTPATIENT
Start: 2018-02-15 | End: 2018-02-15 | Stop reason: HOSPADM

## 2018-02-15 RX ORDER — SUCCINYLCHOLINE CHLORIDE 20 MG/ML
INJECTION INTRAMUSCULAR; INTRAVENOUS AS NEEDED
Status: DISCONTINUED | OUTPATIENT
Start: 2018-02-15 | End: 2018-02-15 | Stop reason: SURG

## 2018-02-15 RX ORDER — EPHEDRINE SULFATE 50 MG/ML
INJECTION, SOLUTION INTRAVENOUS AS NEEDED
Status: DISCONTINUED | OUTPATIENT
Start: 2018-02-15 | End: 2018-02-15 | Stop reason: SURG

## 2018-02-15 RX ORDER — BUPIVACAINE HYDROCHLORIDE 2.5 MG/ML
INJECTION, SOLUTION EPIDURAL; INFILTRATION; INTRACAUDAL AS NEEDED
Status: DISCONTINUED | OUTPATIENT
Start: 2018-02-15 | End: 2018-02-15 | Stop reason: HOSPADM

## 2018-02-15 RX ORDER — ACETAMINOPHEN 650 MG/1
650 SUPPOSITORY RECTAL ONCE AS NEEDED
Status: DISCONTINUED | OUTPATIENT
Start: 2018-02-15 | End: 2018-02-15 | Stop reason: HOSPADM

## 2018-02-15 RX ORDER — HYDROCODONE BITARTRATE AND ACETAMINOPHEN 7.5; 325 MG/1; MG/1
1 TABLET ORAL EVERY 6 HOURS PRN
Qty: 30 TABLET | Refills: 0 | Status: SHIPPED | OUTPATIENT
Start: 2018-02-15 | End: 2019-11-11

## 2018-02-15 RX ORDER — LIDOCAINE HYDROCHLORIDE 40 MG/ML
INJECTION, SOLUTION RETROBULBAR; TOPICAL AS NEEDED
Status: DISCONTINUED | OUTPATIENT
Start: 2018-02-15 | End: 2018-02-15 | Stop reason: SURG

## 2018-02-15 RX ORDER — ROCURONIUM BROMIDE 10 MG/ML
INJECTION, SOLUTION INTRAVENOUS AS NEEDED
Status: DISCONTINUED | OUTPATIENT
Start: 2018-02-15 | End: 2018-02-15 | Stop reason: SURG

## 2018-02-15 RX ORDER — CLINDAMYCIN PHOSPHATE 900 MG/50ML
900 INJECTION INTRAVENOUS ONCE
Status: COMPLETED | OUTPATIENT
Start: 2018-02-15 | End: 2018-02-15

## 2018-02-15 RX ORDER — LABETALOL HYDROCHLORIDE 5 MG/ML
5 INJECTION, SOLUTION INTRAVENOUS
Status: DISCONTINUED | OUTPATIENT
Start: 2018-02-15 | End: 2018-02-15 | Stop reason: HOSPADM

## 2018-02-15 RX ADMIN — DEXAMETHASONE SODIUM PHOSPHATE 4 MG: 4 INJECTION, SOLUTION INTRAMUSCULAR; INTRAVENOUS at 09:15

## 2018-02-15 RX ADMIN — FENTANYL CITRATE 100 MCG: 50 INJECTION, SOLUTION INTRAMUSCULAR; INTRAVENOUS at 08:55

## 2018-02-15 RX ADMIN — PHENYLEPHRINE HYDROCHLORIDE 100 MCG: 10 INJECTION INTRAVENOUS at 09:11

## 2018-02-15 RX ADMIN — SODIUM CHLORIDE, SODIUM GLUCONATE, SODIUM ACETATE, POTASSIUM CHLORIDE, AND MAGNESIUM CHLORIDE: 526; 502; 368; 37; 30 INJECTION, SOLUTION INTRAVENOUS at 09:18

## 2018-02-15 RX ADMIN — SUCCINYLCHOLINE CHLORIDE 200 MG: 20 INJECTION, SOLUTION INTRAMUSCULAR; INTRAVENOUS at 08:55

## 2018-02-15 RX ADMIN — ROCURONIUM BROMIDE 5 MG: 10 INJECTION INTRAVENOUS at 08:55

## 2018-02-15 RX ADMIN — PHENYLEPHRINE HYDROCHLORIDE 100 MCG: 10 INJECTION INTRAVENOUS at 09:39

## 2018-02-15 RX ADMIN — CLINDAMYCIN IN 5 PERCENT DEXTROSE 900 MG: 18 INJECTION, SOLUTION INTRAVENOUS at 09:00

## 2018-02-15 RX ADMIN — EPHEDRINE SULFATE 10 MG: 50 INJECTION INTRAMUSCULAR; INTRAVENOUS; SUBCUTANEOUS at 09:01

## 2018-02-15 RX ADMIN — LIDOCAINE HYDROCHLORIDE 5 ML: 40 INJECTION, SOLUTION RETROBULBAR; TOPICAL at 08:55

## 2018-02-15 RX ADMIN — PHENYLEPHRINE HYDROCHLORIDE 100 MCG: 10 INJECTION INTRAVENOUS at 09:29

## 2018-02-15 RX ADMIN — PHENYLEPHRINE HYDROCHLORIDE 100 MCG: 10 INJECTION INTRAVENOUS at 09:34

## 2018-02-15 RX ADMIN — PHENYLEPHRINE HYDROCHLORIDE 100 MCG: 10 INJECTION INTRAVENOUS at 09:37

## 2018-02-15 RX ADMIN — PROPOFOL 200 MG: 10 INJECTION, EMULSION INTRAVENOUS at 08:55

## 2018-02-15 RX ADMIN — PHENYLEPHRINE HYDROCHLORIDE 100 MCG: 10 INJECTION INTRAVENOUS at 09:25

## 2018-02-15 RX ADMIN — GLYCOPYRROLATE 0.4 MG: 0.2 INJECTION, SOLUTION INTRAMUSCULAR; INTRAVENOUS at 09:11

## 2018-02-15 RX ADMIN — SODIUM CHLORIDE 1000 ML: 9 INJECTION, SOLUTION INTRAVENOUS at 07:40

## 2018-02-15 RX ADMIN — EPHEDRINE SULFATE 10 MG: 50 INJECTION INTRAMUSCULAR; INTRAVENOUS; SUBCUTANEOUS at 09:06

## 2018-02-15 RX ADMIN — ONDANSETRON 4 MG: 2 INJECTION INTRAMUSCULAR; INTRAVENOUS at 09:15

## 2018-02-15 NOTE — INTERVAL H&P NOTE
H&P reviewed. The patient was examined and there are no changes to the H&P.             This document has been electronically signed by Marco A Thompson DPM on February 15, 2018 8:07 AM

## 2018-02-15 NOTE — ANESTHESIA PREPROCEDURE EVALUATION
Anesthesia Evaluation     Patient summary reviewed and Nursing notes reviewed   NPO Solid Status: > 8 hours  NPO Liquid Status: > 8 hours           Airway   Mallampati: II  TM distance: >3 FB  Neck ROM: full  Comment: His neck has decreased extension as well as decreased ability to turn to left or right  Has a beard  Dental - normal exam   (+) poor dentation    Pulmonary - negative pulmonary ROS and normal exam    breath sounds clear to auscultation    PE comment: AICD is in left chest wall  Cardiovascular - normal exam    ECG reviewed  Patient on routine beta blocker and Beta blocker given within 24 hours of surgery  Rhythm: regular  Rate: normal    (+) pacemaker ( originally inserted in 2010 and replaced 7/2016. Has not been discharged since placement.) ICD, pacemaker, hypertension well controlled, past MI ( he states he had a silent MI 30 years ago and had clean coronary arteries in a 2010 catheterization.)  >12 months, dysrhythmias ( currently in NSR, controlled with metoprolol and multaq) Atrial Fib,     ROS comment: History cardiac defibrillator    Neuro/Psych- negative ROS  GI/Hepatic/Renal/Endo    (+) obesity,  diabetes mellitus type 2 well controlled,   GERD:  he is on protonix= ordered pre-op, but he denies having GERD.    Musculoskeletal     Abdominal   (+) obese,    Substance History - negative use     OB/GYN negative ob/gyn ROS         Other   (+) arthritis                     Anesthesia Plan    ASA 4     general     intravenous induction   Anesthetic plan and risks discussed with patient.

## 2018-02-15 NOTE — DISCHARGE INSTRUCTIONS
Discharge home once stable per MDA  Resume normal diet as tolerated  Keep dressing c/d/i  Ice and elevate  wbat in cam boot  Norco 7.5 prn pain  Follow up Monday 2/19/17          This document has been electronically signed by Marco A Thompson DPM on February 15, 2018 11:04 AM

## 2018-02-15 NOTE — ANESTHESIA PROCEDURE NOTES
Airway  Urgency: elective    Difficult airway    General Information and Staff    Patient location during procedure: OR  CRNA: MARY CROCKETT    Indications and Patient Condition  Indications for airway management: airway protection    Preoxygenated: yes  Mask difficulty assessment: 2 - vent by mask + OA or adjuvant +/- NMBA    Final Airway Details  Final airway type: endotracheal airway      Successful airway: ETT  Cuffed: yes   Successful intubation technique: video laryngoscopy  Facilitating devices/methods: intubating stylet  Endotracheal tube insertion site: oral  Blade: Keila  Blade size: #4  ETT size: 7.5 mm  Cormack-Lehane Classification: grade IIa - partial view of glottis  Placement verified by: chest auscultation, capnometry and palpation of cuff   Measured from: lips  ETT to lips (cm): 22  Number of attempts at approach: 2    Additional Comments  No view whatsoever with MAC4. Switched to Ghotra with full view of Glottis.

## 2018-02-15 NOTE — BRIEF OP NOTE
HAMMER TOE REPAIR  Progress Note    Emre Lsia  2/15/2018    Pre-op Diagnosis:   Hammer toe of left foot [M20.42]  Hallux malleus of left foot [M20.32]       Post-Op Diagnosis Codes:     * Hammer toe of left foot [M20.42]     * Hallux malleus of left foot [M20.32]    Procedure/CPT® Codes:      Procedure(s):  HAMMERTOE CORRECTION LEFT SECOND, THIRD AND FOURTH TOES AND HALLUX INTERPHALANGEAL JOINT ARTHRODESIS LEFT FOOT (Micro Asnis, 4.0 & 5.0 Asnis)      (c-arm)    Surgeon(s):  Marco A Thompson DPM    Anesthesia: Choice    Staff:   Circulator: Galina Cordero RN  Radiology Technologist: Barak Melissa  Scrub Person: Ashely Fisher  Vendor Representative: Serge Devlin  Assistant: Megan Pate    Estimated Blood Loss: minimal    Urine Voided: * No values recorded between 2/15/2018  8:43 AM and 2/15/2018 10:55 AM *    Specimens:                None      Drains:           Findings: consistent with pre-op dx    Complications: none      Marco A Thompson DPM     Date: 2/15/2018  Time: 11:00 AM

## 2018-02-15 NOTE — PLAN OF CARE
Problem: Patient Care Overview (Adult)  Goal: Plan of Care Review  Outcome: Ongoing (interventions implemented as appropriate)   02/15/18 0720   Coping/Psychosocial Response Interventions   Plan Of Care Reviewed With patient   Patient Care Overview   Progress no change     Goal: Adult Individualization and Mutuality  Outcome: Ongoing (interventions implemented as appropriate)   02/15/18 0720   Mutuality/Individual Preferences   What Information Would Help Us Give You More Personalized Care? Patient likes to be called Emre    Individualization   Patient Specific Preferences Patient has opted out: code word NOPE     Goal: Discharge Needs Assessment  Outcome: Ongoing (interventions implemented as appropriate)   02/15/18 0720   Discharge Needs Assessment   Concerns To Be Addressed no discharge needs identified   Living Environment   Transportation Available family or friend will provide       Problem: Perioperative Period (Adult)  Goal: Signs and Symptoms of Listed Potential Problems Will be Absent or Manageable (Perioperative Period)  Outcome: Ongoing (interventions implemented as appropriate)   02/15/18 0720   Perioperative Period   Problems Assessed (Perioperative Period) all   Problems Present (Perioperative Period) none

## 2018-02-15 NOTE — OP NOTE
Date of Procedure: 02/15/18     SURGEON: Marco A Thompson DPM      ASSISTANT: LLOYD Richardson      PREOPERATIVE DIAGNOSIS:   1.  Hallux malleus, left foot  2.  Hammertoe, left second digit  3.  Hammertoe, left third digit   4.  Hammertoe, left fourth digit    POSTOPERATIVE DIAGNOSIS: Same as preop     PROCEDURES PERFORMED:   1.  Hallux interphalangeal joint arthrodesis, left foot  2.  Left second proximal interphalangeal joint arthrodesis   3.  Left third proximal interphalangeal joint arthrodesis  4.  Left fourth proximal interphalangeal joint arthrodesis    ANESTHESIA: Gen.     HEMOSTASIS: Pneumatic ankle tourniquet set at 250 mmHg.      ESTIMATED BLOOD LOSS: 10 mL.      MATERIALS: Tayler 5-0 partially threaded cannulated screw ×1, Harold 3-0 partially threaded cannulated screw ×3     INJECTABLES: 20 cc of quarter percent Marcaine plain postoperatively     COMPLICATIONS: None.      CONDITION: Stable.     PATHOLOGY: None     INDICATIONS FOR PROCEDURE: This is a patient of mine who has rigidly contracted digits 1 through 4 on the left foot.  They are painful and has failed conservative care.  He agrees to undergo the surgical intervention at this time. Consent is signed and documented in the chart. History and physical exam were reviewed prior to patient being taken to the operating room and n.p.o. status was confirmed. No guarantees were given or implied regarding the outcome of this treatment.      DESCRIPTION OF PROCEDURE: After mild sedation was administered by the anesthesia team in the preoperative holding area the patient was transported to the operating room and placed in a supine position.  General anesthesia was then administered and the left foot was prepped and draped in usual sterile technique and a timeout was performed to confirm the correct patient, correct site, and correct procedure.      Attention was then directed to the left foot which was exsanguinated using an Esmarch bandage and the  tourniquet was rapidly inflated to 250 mmHg.  Next attention was directed to the hallux.  At this time an L-type incision was made over the hallux interphalangeal joint.  The head of the proximal phalanx as well as the base of the middle phalanx was exposed and the incision site.  Next utilizing a sagittal bone saw the articular cartilage of both bones was resected.  Next the bones were opposed and held together with a K wire.  Next a 5-0 partially threaded cannulated screw was inserted across the arthrodesis site.  C-arm was used throughout this process to ensure that proper alignment and hardware placement was obtained.    Next attention was directed to the left second, third and fourth digits.  At this time linear incisions were made dorsally over the proximal interphalangeal joint.  These incisions were deepened to subcutaneous tissue taking care to identify and retract all vital neurovascular structures.  Next transverse tenotomy and capsulotomies were made at the proximal interphalangeal joint and the head of proximal phalanxes as well as the base of the middle phalanxes were exposed and the incision sites.  At this time utilizing a sagittal bone saw the head of the proximal phalanx and the base of the middle phalanx were resected on digits 2, 3 and 4 sequentially.  Guidewires were then placed to appose the proximal and middle phalanxes.  3.0 partially threaded cannulated screws were then inserted across the arthrodesis sites to hold compression.  Again, C-arm was used throughout this process to ensure that the toes well aligned and that hardware was well-placed.  Next all incision sites were flushed with copious amounts of normal saline solution.  A layered closure was then performed utilizing 4-0 Vicryl for the subcutaneous tissue and extensor tendons followed by 4-0 nylon for the skin.  Dry sterile dressing was applied.  Tourniquet was then deflated and a rapid hyperemic response noted to the distal digits.   The patient tolerated the procedure and anesthesia well and was transported from operating room to the PACU without signs stable and neurovascular status intact to the operative extremity.    The plan is to discharge patient home once stable per anesthesia.  Normal diet as tolerated.  He is to keep the dressing clean, dry and intact.  He is to be weightbearing as tolerated in a cam boot.  He has been given a written prescription for oral narcotic which he is to take as directed.  He will follow up with me on Monday, February 19, 2018.                  This document has been electronically signed by Marco A Thompson DPM on February 15, 2018 3:56 PM

## 2018-02-15 NOTE — ANESTHESIA POSTPROCEDURE EVALUATION
Patient: Emre Lisa    Procedure Summary     Date Anesthesia Start Anesthesia Stop Room / Location    02/15/18 [id number]  [facility] OR 08 / [facility]       Procedure Diagnosis Surgeon Provider    HAMMERTOE CORRECTION LEFT SECOND, THIRD AND FOURTH TOES AND HALLUX INTERPHALANGEAL JOINT ARTHRODESIS LEFT FOOT (Micro Asnis, 4.0 & 5.0 Asnis)      (c-arm) (Left Toes) Hammer toe of left foot; Hallux malleus of left foot  (Hammer toe of left foot [M20.42]; Hallux malleus of left foot [M20.32]) CHRISTIAN Shafer MD          Anesthesia Type: general  Last vitals  BP   104/83 (02/15/18 0726)   Temp   98.2 °F (36.8 °C) (02/15/18 0726)   Pulse   72 (02/15/18 0726)   Resp   18 (02/15/18 0726)     SpO2   96 % (02/15/18 0726)     Post Anesthesia Care and Evaluation    Patient location during evaluation: bedside  Patient participation: complete - patient cannot participate  Level of consciousness: awake  Pain score: 0  Pain management: adequate  Airway patency: patent  Anesthetic complications: No anesthetic complications  PONV Status: none  Cardiovascular status: acceptable  Respiratory status: acceptable  Hydration status: acceptable

## 2018-02-15 NOTE — H&P (VIEW-ONLY)
Emre Lisa  1960  57 y.o. male   PCP-Bart   BS - 89 per pt  Patient is here to discuss postponing surgery for his left foot hammertoes.      02/06/18      Chief Complaint   Patient presents with   • Left Foot - Follow-up           History of Present Illness    Patient presents to clinic today for follow-up.  He is doing well. He is ready to proceed with surgery. He has no new pedal complaints.     Past Medical History:   Diagnosis Date   • Arthritis    • Diabetes mellitus    • Diabetic foot ulcer associated with type 2 diabetes mellitus     left great toe   • Hammer toe    • Hypertension    • Neuropathy in diabetes    • Onychomycosis          Past Surgical History:   Procedure Laterality Date   • AMPUTATION DIGIT Right 3/5/2017    Procedure: RIGHT AMPUTATION TRANSMETATRASAL , RECESSION GASTROCNEMOUS;  Surgeon: Marco A Thompson DPM;  Location: [facility];  Service:    • CARDIAC DEFIBRILLATOR PLACEMENT  2010   • CARPAL TUNNEL RELEASE  2015    elbow and wrist left arm   • FOOT WOUND CLOSURE Right 3/2/2017    Procedure: INCISION AND DRAINAGE, RIGHT FOOT;  Surgeon: Tung Rosenthal DPM;  Location: [facility];  Service:    • TOE AMPUTATION Right 2016   • TONSILECTOMY, ADENOIDECTOMY, BILATERAL MYRINGOTOMY AND TUBES      age 25         Family History   Problem Relation Age of Onset   • Diabetes Father    • Stroke Father    • No Known Problems Maternal Grandmother    • No Known Problems Maternal Grandfather    • No Known Problems Paternal Grandmother    • No Known Problems Paternal Grandfather    • No Known Problems Son    • No Known Problems Daughter    • No Known Problems Daughter          Social History     Social History   • Marital status:      Spouse name: N/A   • Number of children: N/A   • Years of education: N/A     Occupational History   • Not on file.     Social History Main Topics   • Smoking status: Never Smoker   • Smokeless tobacco: Never Used   • Alcohol use No   • Drug use: No   •  "Sexual activity: Defer     Other Topics Concern   • Not on file     Social History Narrative         Current Outpatient Prescriptions   Medication Sig Dispense Refill   • aspirin 81 MG chewable tablet Chew 81 mg Daily.     • Cholecalciferol (VITAMIN D3) 5000 UNITS capsule capsule Take 5,000 Units by mouth Daily.     • clindamycin (CLEOCIN) 300 MG capsule Take 2 capsules by mouth 3 (Three) Times a Day. 60 capsule 0   • collagenase (SANTYL) 250 UNIT/GM ointment Apply  topically Daily. 90 g 1   • fluticasone (FLONASE) 50 MCG/ACT nasal spray INSTILL 2 SPRAYS IN NOSTRILS TWICE DAILY AS NEEDED FOR RHINITIS  3   • FLUVIRIN 0.5 ML suspension prefilled syringe injection      • gabapentin (NEURONTIN) 300 MG capsule Take 300 mg by mouth 4 (Four) Times a Day.     • glimepiride (AMARYL) 2 MG tablet Take 4 mg by mouth 2 (Two) Times a Day.     • magnesium oxide (MAGOX) 400 (241.3 MG) MG tablet tablet TAKE 1 TABLET BY MOUTH 2 (TWO) TIMES DAILY.  1   • metFORMIN (GLUCOPHAGE) 1000 MG tablet TAKE 1 TABLET BY MOUTH 2 (TWO) TIMES DAILY WITH MEALS. INDICATIONS: TYPE 2 DIABETES MELLITUS  0   • metoprolol tartrate (LOPRESSOR) 25 MG tablet 1/2 tablet twice daily  1   • MULTAQ 400 MG tablet TAKE 1 TABLET BY MOUTH EVERY DAY  1   • mupirocin (BACTROBAN) 2 % ointment      • ONE TOUCH ULTRA TEST test strip USE TO TEST BLOOD SUGAR THREE TIMES A DAY  1   • valsartan (DIOVAN) 160 MG tablet 0.25 tablet once daily  0   • vitamin C (ASCORBIC ACID) 500 MG tablet Take 500 mg by mouth Daily.     • vitamin E 200 UNIT capsule Take 200 Units by mouth Daily.       No current facility-administered medications for this visit.            OBJECTIVE    Ht 193 cm (75.98\")  Wt 127 kg (279 lb 15.8 oz)  BMI 34.1 kg/m2    Review of Systems   Constitutional: Negative for chills and fever.   Cardiovascular: Negative for chest pain.   Gastrointestinal: Negative for constipation, diarrhea, nausea and vomiting.   Skin: no open wounds noted.    Musculoskeletal: negative " foot pain    Constitutional: well developed, well nourished    HEENT: Normocephalic and atraumatic, normal hearing    Respiratory: Non labored respirations noted    Cardiovascular:    CFT brisk  to all digits  Skin temp is warm to warm from proximal tibia to distal digits  Pedal hair growth diminished.   No erythema or edema noted     Musculoskeletal:  TMA noted Right foot  Rigidly contracted left hallux at the IPJ  semi reducibly contracted digits 2-5 left    Dermatological:   Nails 1-5 L are thickened   Skin is warm, dry and intact    Webspaces 1-4 bilateral are clean, dry and intact.   No subcutaneous nodules or masses noted    No open wounds noted   HPK tissue dorsal left 4th digit    Neurological:   Protective sensation absent  Sensation intact to light touch    DTR intact    Psychiatric: A&O x 3 with normal mood and affect. NAD.       Procedures        ASSESSMENT AND PLAN    Emre was seen today for follow-up.    Diagnoses and all orders for this visit:    Hammer toe of left foot    Hallux malleus of left foot    Type 2 diabetes mellitus with peripheral neuropathy    S/P transmetatarsal amputation of foot, right    - Lengthy discussion held with patient regarding surgical intervention for the left foot.  Risks and benefits were discussed in detail.  No guarantees were given or implied.  Plan will be for left hallux IPJ fusion and hammertoe correction 2 through 5.   - All questions were answered   - Return to the clinic after surgery          This document has been electronically signed by Marco A Thompson DPM on February 6, 2018 12:26 PM

## 2018-02-15 NOTE — PLAN OF CARE
Problem: Patient Care Overview (Adult)  Goal: Plan of Care Review  Outcome: Ongoing (interventions implemented as appropriate)   02/15/18 1123   Coping/Psychosocial Response Interventions   Plan Of Care Reviewed With patient   Patient Care Overview   Progress improving   Outcome Evaluation   Outcome Summary/Follow up Plan vss, left foot dsg with boot noted cdi and elevated, pain managment continued, no distress noted     Goal: Adult Individualization and Mutuality  Outcome: Ongoing (interventions implemented as appropriate)      Problem: Perioperative Period (Adult)  Goal: Signs and Symptoms of Listed Potential Problems Will be Absent or Manageable (Perioperative Period)  Outcome: Ongoing (interventions implemented as appropriate)

## 2018-02-19 ENCOUNTER — OFFICE VISIT (OUTPATIENT)
Dept: PODIATRY | Facility: CLINIC | Age: 58
End: 2018-02-19

## 2018-02-19 VITALS — HEIGHT: 76 IN | HEART RATE: 95 BPM | WEIGHT: 289 LBS | BODY MASS INDEX: 35.19 KG/M2 | OXYGEN SATURATION: 98 %

## 2018-02-19 DIAGNOSIS — M20.42 HAMMER TOE OF LEFT FOOT: Primary | ICD-10-CM

## 2018-02-19 DIAGNOSIS — M20.32 HALLUX MALLEUS OF LEFT FOOT: ICD-10-CM

## 2018-02-19 PROCEDURE — 99024 POSTOP FOLLOW-UP VISIT: CPT | Performed by: PODIATRIST

## 2018-02-19 NOTE — PROGRESS NOTES
Emre Lisa  1960  57 y.o. male     Patient presents today for post op recheck of his left foot.      02/19/18      Chief Complaint   Patient presents with   • Left Foot - post op recheck           History of Present Illness    Patient presents to clinic today For his first postoperative visit.  He had hallux IPJ fusion and hammertoe correction digits 2 through 4 left foot DOS February 15 2019.  His dressing is clean, dry and intact.  He denies pain.    Past Medical History:   Diagnosis Date   • Arthritis    • Atrial fibrillation    • Diabetes mellitus    • Diabetic foot ulcer associated with type 2 diabetes mellitus     left great toe   • Hammer toe    • Hypertension    • MI (myocardial infarction)    • Neuropathy in diabetes    • Onychomycosis          Past Surgical History:   Procedure Laterality Date   • AMPUTATION DIGIT Right 3/5/2017    Procedure: RIGHT AMPUTATION TRANSMETATRASAL , RECESSION GASTROCNEMOUS;  Surgeon: Marco A Thompson DPM;  Location: [facility];  Service:    • CARDIAC DEFIBRILLATOR PLACEMENT  2010, 2016   • CARPAL TUNNEL RELEASE  2015    elbow and wrist left arm   • FOOT SURGERY     • FOOT WOUND CLOSURE Right 3/2/2017    Procedure: INCISION AND DRAINAGE, RIGHT FOOT;  Surgeon: Tung Rosenthal DPM;  Location: [facility];  Service:    • TOE AMPUTATION Right 2016   • TONSILECTOMY, ADENOIDECTOMY, BILATERAL MYRINGOTOMY AND TUBES      age 25         Family History   Problem Relation Age of Onset   • Diabetes Father    • Stroke Father    • No Known Problems Maternal Grandmother    • No Known Problems Maternal Grandfather    • No Known Problems Paternal Grandmother    • No Known Problems Paternal Grandfather    • No Known Problems Son    • No Known Problems Daughter    • No Known Problems Daughter          Social History     Social History   • Marital status:      Spouse name: N/A   • Number of children: N/A   • Years of education: N/A     Occupational History   • Not on file.  "    Social History Main Topics   • Smoking status: Never Smoker   • Smokeless tobacco: Never Used   • Alcohol use No   • Drug use: No   • Sexual activity: Defer     Other Topics Concern   • Not on file     Social History Narrative         Current Outpatient Prescriptions   Medication Sig Dispense Refill   • aspirin 81 MG chewable tablet Chew 81 mg Every Night.     • Cholecalciferol (VITAMIN D3) 5000 UNITS capsule capsule Take 5,000 Units by mouth Every Night.     • Cyanocobalamin (VITAMIN B 12 PO) Take 1,000 mcg by mouth Every Night.     • dronedarone (MULTAQ) 400 MG tablet Take 400 mg by mouth Every Night.     • fluticasone (FLONASE) 50 MCG/ACT nasal spray 2 sprays into each nostril As Needed for Rhinitis.     • glimepiride (AMARYL) 2 MG tablet Take 4 mg by mouth 2 (Two) Times a Day.     • HYDROcodone-acetaminophen (NORCO) 7.5-325 MG per tablet Take 1 tablet by mouth Every 6 (Six) Hours As Needed for Moderate Pain . 30 tablet 0   • magnesium oxide (MAGOX) 400 (241.3 Mg) MG tablet tablet Take 400 mg by mouth 2 (Two) Times a Day.     • metFORMIN (GLUCOPHAGE) 1000 MG tablet Take 1,000 mg by mouth 2 (Two) Times a Day With Meals. Patient states he only uses when needed     • metoprolol tartrate (LOPRESSOR) 25 MG tablet Take 12.5 mg by mouth Every Night.     • ONE TOUCH ULTRA TEST test strip USE TO TEST BLOOD SUGAR THREE TIMES A DAY  1   • valsartan (DIOVAN) 160 MG tablet Take 40 mg by mouth Every Night. 1/2 tablet daily      • vitamin C (ASCORBIC ACID) 500 MG tablet Take 500 mg by mouth Every Night.     • vitamin E 200 UNIT capsule Take 200 Units by mouth Every Night.       No current facility-administered medications for this visit.            OBJECTIVE    Pulse 95  Ht 193 cm (76\")  Wt 131 kg (289 lb)  SpO2 98%  BMI 35.18 kg/m2    Review of Systems   Constitutional: Negative for chills and fever.   Cardiovascular: Negative for chest pain.   Gastrointestinal: Negative for constipation, diarrhea, nausea and vomiting. "   Skin: no open wounds noted.    Musculoskeletal: negative foot pain    Constitutional: well developed, well nourished    HEENT: Normocephalic and atraumatic, normal hearing    Respiratory: Non labored respirations noted    LLE: Incision sites are well approximated to the toes.  Mild radha-incisional erythema noted.  No drainage noted.  Negative Homans.    Psychiatric: A&O x 3 with normal mood and affect. NAD.       Procedures        ASSESSMENT AND PLAN    Emre was seen today for post op recheck.    Diagnoses and all orders for this visit:    Hammer toe of left foot    Hallux malleus of left foot    - Postop day #4  - Sterile dressing change performed, keep clean, dry and intact  - Weightbearing as tolerated in cam boot  - All his questions were answered  - Return to clinic in 1 week        This document has been electronically signed by Marco A Thompson DPM on February 19, 2018 8:43 AM

## 2018-02-26 ENCOUNTER — OFFICE VISIT (OUTPATIENT)
Dept: PODIATRY | Facility: CLINIC | Age: 58
End: 2018-02-26

## 2018-02-26 VITALS — WEIGHT: 288.8 LBS | BODY MASS INDEX: 35.17 KG/M2 | HEIGHT: 76 IN

## 2018-02-26 DIAGNOSIS — M20.42 HAMMER TOE OF LEFT FOOT: Primary | ICD-10-CM

## 2018-02-26 DIAGNOSIS — M20.32 HALLUX MALLEUS OF LEFT FOOT: ICD-10-CM

## 2018-02-26 DIAGNOSIS — E11.42 TYPE 2 DIABETES MELLITUS WITH PERIPHERAL NEUROPATHY (HCC): ICD-10-CM

## 2018-02-26 PROCEDURE — 99024 POSTOP FOLLOW-UP VISIT: CPT | Performed by: PODIATRIST

## 2018-02-26 NOTE — PROGRESS NOTES
Emre Lisa  1960  57 y.o. male     Patient presents today for post op recheck of his left foot.      02/26/18      Chief Complaint   Patient presents with   • Left Foot - Post-op           History of Present Illness    Patient presents to clinic today for his second postoperative visit.  He had hallux IPJ fusion and hammertoe correction digits 2 -4 left foot DOS February 15 2019.  His dressing is clean, dry and intact.  He denies pain.    Past Medical History:   Diagnosis Date   • Arthritis    • Atrial fibrillation    • Diabetes mellitus    • Diabetic foot ulcer associated with type 2 diabetes mellitus     left great toe   • Hammer toe    • Hypertension    • MI (myocardial infarction)    • Neuropathy in diabetes    • Onychomycosis          Past Surgical History:   Procedure Laterality Date   • AMPUTATION DIGIT Right 3/5/2017    Procedure: RIGHT AMPUTATION TRANSMETATRASAL , RECESSION GASTROCNEMOUS;  Surgeon: Marco A Thompson DPM;  Location: [facility];  Service:    • CARDIAC DEFIBRILLATOR PLACEMENT  2010, 2016   • CARPAL TUNNEL RELEASE  2015    elbow and wrist left arm   • FOOT SURGERY     • FOOT WOUND CLOSURE Right 3/2/2017    Procedure: INCISION AND DRAINAGE, RIGHT FOOT;  Surgeon: Tung Rosenthal DPM;  Location: [facility];  Service:    • TOE AMPUTATION Right 2016   • TONSILECTOMY, ADENOIDECTOMY, BILATERAL MYRINGOTOMY AND TUBES      age 25         Family History   Problem Relation Age of Onset   • Diabetes Father    • Stroke Father    • No Known Problems Maternal Grandmother    • No Known Problems Maternal Grandfather    • No Known Problems Paternal Grandmother    • No Known Problems Paternal Grandfather    • No Known Problems Son    • No Known Problems Daughter    • No Known Problems Daughter          Social History     Social History   • Marital status:      Spouse name: N/A   • Number of children: N/A   • Years of education: N/A     Occupational History   • Not on file.     Social History  "Main Topics   • Smoking status: Never Smoker   • Smokeless tobacco: Never Used   • Alcohol use No   • Drug use: No   • Sexual activity: Defer     Other Topics Concern   • Not on file     Social History Narrative         Current Outpatient Prescriptions   Medication Sig Dispense Refill   • aspirin 81 MG chewable tablet Chew 81 mg Every Night.     • Cholecalciferol (VITAMIN D3) 5000 UNITS capsule capsule Take 5,000 Units by mouth Every Night.     • Cyanocobalamin (VITAMIN B 12 PO) Take 1,000 mcg by mouth Every Night.     • dronedarone (MULTAQ) 400 MG tablet Take 400 mg by mouth Every Night.     • fluticasone (FLONASE) 50 MCG/ACT nasal spray 2 sprays into each nostril As Needed for Rhinitis.     • glimepiride (AMARYL) 2 MG tablet Take 4 mg by mouth 2 (Two) Times a Day.     • HYDROcodone-acetaminophen (NORCO) 7.5-325 MG per tablet Take 1 tablet by mouth Every 6 (Six) Hours As Needed for Moderate Pain . 30 tablet 0   • magnesium oxide (MAGOX) 400 (241.3 Mg) MG tablet tablet Take 400 mg by mouth 2 (Two) Times a Day.     • metFORMIN (GLUCOPHAGE) 1000 MG tablet Take 1,000 mg by mouth 2 (Two) Times a Day With Meals. Patient states he only uses when needed     • metoprolol tartrate (LOPRESSOR) 25 MG tablet Take 12.5 mg by mouth Every Night.     • ONE TOUCH ULTRA TEST test strip USE TO TEST BLOOD SUGAR THREE TIMES A DAY  1   • valsartan (DIOVAN) 160 MG tablet Take 40 mg by mouth Every Night. 1/2 tablet daily      • vitamin C (ASCORBIC ACID) 500 MG tablet Take 500 mg by mouth Every Night.     • vitamin E 200 UNIT capsule Take 200 Units by mouth Every Night.       No current facility-administered medications for this visit.            OBJECTIVE    Ht 193 cm (75.98\")  Wt 131 kg (288 lb 12.8 oz)  BMI 35.17 kg/m2    Review of Systems   Constitutional: Negative for chills and fever.   Cardiovascular: Negative for chest pain.   Gastrointestinal: Negative for constipation, diarrhea, nausea and vomiting.    Musculoskeletal: negative " foot pain    Constitutional: well developed, well nourished    HEENT: Normocephalic and atraumatic, normal hearing    Respiratory: Non labored respirations noted    LLE: Incision sites are well approximated to the toes.  No erythema noted.  No drainage noted.  Negative Homans.    Psychiatric: A&O x 3 with normal mood and affect. NAD.       Procedures        ASSESSMENT AND PLAN    Emre was seen today for post-op.    Diagnoses and all orders for this visit:    Hammer toe of left foot    Hallux malleus of left foot    Type 2 diabetes mellitus with peripheral neuropathy    - Sterile dressing change performed, keep clean, dry and intact  - Weightbearing as tolerated in cam boot  - All his questions were answered  - Return to clinic in 1 week          This document has been electronically signed by Marco A Thompson DPM on February 26, 2018 2:39 PM

## 2018-03-05 ENCOUNTER — ANESTHESIA (OUTPATIENT)
Dept: PERIOP | Facility: HOSPITAL | Age: 58
End: 2018-03-05

## 2018-03-05 ENCOUNTER — HOSPITAL ENCOUNTER (INPATIENT)
Facility: HOSPITAL | Age: 58
LOS: 8 days | Discharge: HOME | End: 2018-03-13
Attending: INTERNAL MEDICINE | Admitting: INTERNAL MEDICINE

## 2018-03-05 ENCOUNTER — APPOINTMENT (OUTPATIENT)
Dept: GENERAL RADIOLOGY | Facility: HOSPITAL | Age: 58
End: 2018-03-05

## 2018-03-05 ENCOUNTER — ANESTHESIA EVENT (OUTPATIENT)
Dept: PERIOP | Facility: HOSPITAL | Age: 58
End: 2018-03-05

## 2018-03-05 ENCOUNTER — OFFICE VISIT (OUTPATIENT)
Dept: PODIATRY | Facility: CLINIC | Age: 58
End: 2018-03-05

## 2018-03-05 ENCOUNTER — APPOINTMENT (OUTPATIENT)
Dept: ULTRASOUND IMAGING | Facility: HOSPITAL | Age: 58
End: 2018-03-05

## 2018-03-05 VITALS — HEIGHT: 76 IN | BODY MASS INDEX: 35.07 KG/M2 | HEART RATE: 91 BPM | WEIGHT: 288 LBS | OXYGEN SATURATION: 98 %

## 2018-03-05 DIAGNOSIS — M20.42 HAMMER TOE OF LEFT FOOT: ICD-10-CM

## 2018-03-05 DIAGNOSIS — L03.116 CELLULITIS OF LEFT FOOT: Primary | ICD-10-CM

## 2018-03-05 DIAGNOSIS — T84.7XXD INFECTED HARDWARE IN LEFT LOWER EXTREMITY, SUBSEQUENT ENCOUNTER: Primary | ICD-10-CM

## 2018-03-05 DIAGNOSIS — M20.32 HALLUX MALLEUS OF LEFT FOOT: ICD-10-CM

## 2018-03-05 DIAGNOSIS — Z74.09 IMPAIRED PHYSICAL MOBILITY: ICD-10-CM

## 2018-03-05 DIAGNOSIS — L03.116 CELLULITIS OF LEFT FOOT: ICD-10-CM

## 2018-03-05 DIAGNOSIS — T84.7XXA INFECTED HARDWARE IN LEFT LOWER EXTREMITY, INITIAL ENCOUNTER (HCC): ICD-10-CM

## 2018-03-05 LAB
ALBUMIN SERPL-MCNC: 3.4 G/DL (ref 3.4–4.8)
ALBUMIN/GLOB SERPL: 1.1 G/DL (ref 1.1–1.8)
ALP SERPL-CCNC: 55 U/L (ref 38–126)
ALT SERPL W P-5'-P-CCNC: 39 U/L (ref 21–72)
ANION GAP SERPL CALCULATED.3IONS-SCNC: 13 MMOL/L (ref 5–15)
AST SERPL-CCNC: 21 U/L (ref 17–59)
BASOPHILS # BLD AUTO: 0.01 10*3/MM3 (ref 0–0.2)
BASOPHILS NFR BLD AUTO: 0.2 % (ref 0–2)
BILIRUB SERPL-MCNC: 0.5 MG/DL (ref 0.2–1.3)
BUN BLD-MCNC: 23 MG/DL (ref 7–21)
BUN/CREAT SERPL: 30.7 (ref 7–25)
CALCIUM SPEC-SCNC: 8.4 MG/DL (ref 8.4–10.2)
CHLORIDE SERPL-SCNC: 100 MMOL/L (ref 95–110)
CO2 SERPL-SCNC: 25 MMOL/L (ref 22–31)
CREAT BLD-MCNC: 0.75 MG/DL (ref 0.7–1.3)
DEPRECATED RDW RBC AUTO: 43 FL (ref 35.1–43.9)
EOSINOPHIL # BLD AUTO: 0.16 10*3/MM3 (ref 0–0.7)
EOSINOPHIL NFR BLD AUTO: 3.3 % (ref 0–7)
ERYTHROCYTE [DISTWIDTH] IN BLOOD BY AUTOMATED COUNT: 13 % (ref 11.5–14.5)
GFR SERPL CREATININE-BSD FRML MDRD: 108 ML/MIN/1.73 (ref 56–130)
GLOBULIN UR ELPH-MCNC: 3 GM/DL (ref 2.3–3.5)
GLUCOSE BLD-MCNC: 77 MG/DL (ref 60–100)
GLUCOSE BLDC GLUCOMTR-MCNC: 72 MG/DL (ref 70–130)
GLUCOSE BLDC GLUCOMTR-MCNC: 76 MG/DL (ref 70–130)
GLUCOSE BLDC GLUCOMTR-MCNC: 83 MG/DL (ref 70–130)
HCT VFR BLD AUTO: 36.8 % (ref 39–49)
HGB BLD-MCNC: 12.4 G/DL (ref 13.7–17.3)
IMM GRANULOCYTES # BLD: 0 10*3/MM3 (ref 0–0.02)
IMM GRANULOCYTES NFR BLD: 0 % (ref 0–0.5)
LYMPHOCYTES # BLD AUTO: 1.21 10*3/MM3 (ref 0.6–4.2)
LYMPHOCYTES NFR BLD AUTO: 24.8 % (ref 10–50)
MCH RBC QN AUTO: 30.1 PG (ref 26.5–34)
MCHC RBC AUTO-ENTMCNC: 33.7 G/DL (ref 31.5–36.3)
MCV RBC AUTO: 89.3 FL (ref 80–98)
MONOCYTES # BLD AUTO: 0.84 10*3/MM3 (ref 0–0.9)
MONOCYTES NFR BLD AUTO: 17.2 % (ref 0–12)
NEUTROPHILS # BLD AUTO: 2.66 10*3/MM3 (ref 2–8.6)
NEUTROPHILS NFR BLD AUTO: 54.5 % (ref 37–80)
PLATELET # BLD AUTO: 146 10*3/MM3 (ref 150–450)
PMV BLD AUTO: 10 FL (ref 8–12)
POTASSIUM BLD-SCNC: 4.4 MMOL/L (ref 3.5–5.1)
PROT SERPL-MCNC: 6.4 G/DL (ref 6.3–8.6)
RBC # BLD AUTO: 4.12 10*6/MM3 (ref 4.37–5.74)
SODIUM BLD-SCNC: 138 MMOL/L (ref 137–145)
WBC NRBC COR # BLD: 4.88 10*3/MM3 (ref 3.2–9.8)

## 2018-03-05 PROCEDURE — 82962 GLUCOSE BLOOD TEST: CPT

## 2018-03-05 PROCEDURE — 87147 CULTURE TYPE IMMUNOLOGIC: CPT | Performed by: INTERNAL MEDICINE

## 2018-03-05 PROCEDURE — 93971 EXTREMITY STUDY: CPT

## 2018-03-05 PROCEDURE — 87070 CULTURE OTHR SPECIMN AEROBIC: CPT | Performed by: INTERNAL MEDICINE

## 2018-03-05 PROCEDURE — 87176 TISSUE HOMOGENIZATION CULTR: CPT | Performed by: PODIATRIST

## 2018-03-05 PROCEDURE — 99024 POSTOP FOLLOW-UP VISIT: CPT | Performed by: PODIATRIST

## 2018-03-05 PROCEDURE — 28002 TREATMENT OF FOOT INFECTION: CPT | Performed by: PODIATRIST

## 2018-03-05 PROCEDURE — 25010000002 HYDROMORPHONE PER 4 MG: Performed by: NURSE ANESTHETIST, CERTIFIED REGISTERED

## 2018-03-05 PROCEDURE — 25010000002 FENTANYL CITRATE (PF) 100 MCG/2ML SOLUTION: Performed by: NURSE ANESTHETIST, CERTIFIED REGISTERED

## 2018-03-05 PROCEDURE — 87186 SC STD MICRODIL/AGAR DIL: CPT | Performed by: PODIATRIST

## 2018-03-05 PROCEDURE — 80053 COMPREHEN METABOLIC PANEL: CPT | Performed by: NURSE PRACTITIONER

## 2018-03-05 PROCEDURE — 87205 SMEAR GRAM STAIN: CPT | Performed by: INTERNAL MEDICINE

## 2018-03-05 PROCEDURE — 20680 REMOVAL OF IMPLANT DEEP: CPT | Performed by: PODIATRIST

## 2018-03-05 PROCEDURE — 87070 CULTURE OTHR SPECIMN AEROBIC: CPT | Performed by: PODIATRIST

## 2018-03-05 PROCEDURE — 20240 BONE BIOPSY OPEN SUPERFICIAL: CPT | Performed by: PODIATRIST

## 2018-03-05 PROCEDURE — 25010000002 MIDAZOLAM PER 1 MG: Performed by: NURSE ANESTHETIST, CERTIFIED REGISTERED

## 2018-03-05 PROCEDURE — 87147 CULTURE TYPE IMMUNOLOGIC: CPT | Performed by: PODIATRIST

## 2018-03-05 PROCEDURE — 0QBR0ZX EXCISION OF LEFT TOE PHALANX, OPEN APPROACH, DIAGNOSTIC: ICD-10-PCS | Performed by: PODIATRIST

## 2018-03-05 PROCEDURE — 87077 CULTURE AEROBIC IDENTIFY: CPT | Performed by: PODIATRIST

## 2018-03-05 PROCEDURE — 85025 COMPLETE CBC W/AUTO DIFF WBC: CPT | Performed by: NURSE PRACTITIONER

## 2018-03-05 PROCEDURE — 0QPM04Z REMOVAL OF INTERNAL FIXATION DEVICE FROM LEFT TARSAL, OPEN APPROACH: ICD-10-PCS | Performed by: PODIATRIST

## 2018-03-05 PROCEDURE — 87205 SMEAR GRAM STAIN: CPT | Performed by: PODIATRIST

## 2018-03-05 PROCEDURE — 87075 CULTR BACTERIA EXCEPT BLOOD: CPT | Performed by: PODIATRIST

## 2018-03-05 PROCEDURE — 25010000002 VANCOMYCIN PER 500 MG: Performed by: NURSE PRACTITIONER

## 2018-03-05 PROCEDURE — 25010000002 PROPOFOL 10 MG/ML EMULSION: Performed by: NURSE ANESTHETIST, CERTIFIED REGISTERED

## 2018-03-05 PROCEDURE — 76000 FLUOROSCOPY <1 HR PHYS/QHP: CPT

## 2018-03-05 RX ORDER — DEXTROSE MONOHYDRATE 25 G/50ML
25 INJECTION, SOLUTION INTRAVENOUS
Status: DISCONTINUED | OUTPATIENT
Start: 2018-03-05 | End: 2018-03-13 | Stop reason: HOSPADM

## 2018-03-05 RX ORDER — FLUMAZENIL 0.1 MG/ML
0.2 INJECTION INTRAVENOUS AS NEEDED
Status: DISCONTINUED | OUTPATIENT
Start: 2018-03-05 | End: 2018-03-05 | Stop reason: HOSPADM

## 2018-03-05 RX ORDER — NICOTINE POLACRILEX 4 MG
15 LOZENGE BUCCAL
Status: DISCONTINUED | OUTPATIENT
Start: 2018-03-05 | End: 2018-03-13 | Stop reason: HOSPADM

## 2018-03-05 RX ORDER — SODIUM CHLORIDE 9 MG/ML
100 INJECTION, SOLUTION INTRAVENOUS CONTINUOUS
Status: DISCONTINUED | OUTPATIENT
Start: 2018-03-05 | End: 2018-03-11

## 2018-03-05 RX ORDER — ASCORBIC ACID 500 MG
500 TABLET ORAL NIGHTLY
Status: DISCONTINUED | OUTPATIENT
Start: 2018-03-05 | End: 2018-03-13 | Stop reason: HOSPADM

## 2018-03-05 RX ORDER — NALOXONE HCL 0.4 MG/ML
0.2 VIAL (ML) INJECTION AS NEEDED
Status: DISCONTINUED | OUTPATIENT
Start: 2018-03-05 | End: 2018-03-05 | Stop reason: HOSPADM

## 2018-03-05 RX ORDER — FENTANYL CITRATE 50 UG/ML
INJECTION, SOLUTION INTRAMUSCULAR; INTRAVENOUS AS NEEDED
Status: DISCONTINUED | OUTPATIENT
Start: 2018-03-05 | End: 2018-03-05 | Stop reason: SURG

## 2018-03-05 RX ORDER — ACETAMINOPHEN 325 MG/1
650 TABLET ORAL EVERY 4 HOURS PRN
Status: DISCONTINUED | OUTPATIENT
Start: 2018-03-05 | End: 2018-03-13 | Stop reason: HOSPADM

## 2018-03-05 RX ORDER — MIDAZOLAM HYDROCHLORIDE 1 MG/ML
INJECTION INTRAMUSCULAR; INTRAVENOUS AS NEEDED
Status: DISCONTINUED | OUTPATIENT
Start: 2018-03-05 | End: 2018-03-05 | Stop reason: SURG

## 2018-03-05 RX ORDER — LIDOCAINE HYDROCHLORIDE 20 MG/ML
INJECTION, SOLUTION INFILTRATION; PERINEURAL AS NEEDED
Status: DISCONTINUED | OUTPATIENT
Start: 2018-03-05 | End: 2018-03-05 | Stop reason: SURG

## 2018-03-05 RX ORDER — SODIUM CHLORIDE 0.9 % (FLUSH) 0.9 %
1-10 SYRINGE (ML) INJECTION AS NEEDED
Status: DISCONTINUED | OUTPATIENT
Start: 2018-03-05 | End: 2018-03-13 | Stop reason: HOSPADM

## 2018-03-05 RX ORDER — LABETALOL HYDROCHLORIDE 5 MG/ML
5 INJECTION, SOLUTION INTRAVENOUS
Status: DISCONTINUED | OUTPATIENT
Start: 2018-03-05 | End: 2018-03-05 | Stop reason: HOSPADM

## 2018-03-05 RX ORDER — ONDANSETRON 2 MG/ML
4 INJECTION INTRAMUSCULAR; INTRAVENOUS EVERY 6 HOURS PRN
Status: DISCONTINUED | OUTPATIENT
Start: 2018-03-05 | End: 2018-03-13 | Stop reason: HOSPADM

## 2018-03-05 RX ORDER — ACETAMINOPHEN 650 MG/1
650 SUPPOSITORY RECTAL ONCE AS NEEDED
Status: DISCONTINUED | OUTPATIENT
Start: 2018-03-05 | End: 2018-03-05 | Stop reason: HOSPADM

## 2018-03-05 RX ORDER — MEPERIDINE HYDROCHLORIDE 50 MG/ML
12.5 INJECTION INTRAMUSCULAR; INTRAVENOUS; SUBCUTANEOUS
Status: DISCONTINUED | OUTPATIENT
Start: 2018-03-05 | End: 2018-03-05 | Stop reason: HOSPADM

## 2018-03-05 RX ORDER — ACETAMINOPHEN 325 MG/1
650 TABLET ORAL ONCE AS NEEDED
Status: DISCONTINUED | OUTPATIENT
Start: 2018-03-05 | End: 2018-03-05 | Stop reason: HOSPADM

## 2018-03-05 RX ORDER — EPHEDRINE SULFATE 50 MG/ML
5 INJECTION, SOLUTION INTRAVENOUS ONCE AS NEEDED
Status: DISCONTINUED | OUTPATIENT
Start: 2018-03-05 | End: 2018-03-05 | Stop reason: HOSPADM

## 2018-03-05 RX ORDER — VALSARTAN 40 MG/1
40 TABLET ORAL NIGHTLY
Status: DISCONTINUED | OUTPATIENT
Start: 2018-03-05 | End: 2018-03-13 | Stop reason: HOSPADM

## 2018-03-05 RX ORDER — FLUTICASONE PROPIONATE 50 MCG
2 SPRAY, SUSPENSION (ML) NASAL AS NEEDED
Status: DISCONTINUED | OUTPATIENT
Start: 2018-03-05 | End: 2018-03-13 | Stop reason: HOSPADM

## 2018-03-05 RX ORDER — FAMOTIDINE 40 MG/1
40 TABLET, FILM COATED ORAL DAILY
Status: DISCONTINUED | OUTPATIENT
Start: 2018-03-05 | End: 2018-03-10

## 2018-03-05 RX ORDER — PROPOFOL 10 MG/ML
VIAL (ML) INTRAVENOUS AS NEEDED
Status: DISCONTINUED | OUTPATIENT
Start: 2018-03-05 | End: 2018-03-05 | Stop reason: SURG

## 2018-03-05 RX ORDER — HYDROCODONE BITARTRATE AND ACETAMINOPHEN 7.5; 325 MG/1; MG/1
1 TABLET ORAL EVERY 6 HOURS PRN
Status: DISCONTINUED | OUTPATIENT
Start: 2018-03-05 | End: 2018-03-13 | Stop reason: HOSPADM

## 2018-03-05 RX ORDER — GLIPIZIDE 5 MG/1
5 TABLET ORAL
Status: DISCONTINUED | OUTPATIENT
Start: 2018-03-06 | End: 2018-03-13 | Stop reason: HOSPADM

## 2018-03-05 RX ORDER — DIPHENHYDRAMINE HYDROCHLORIDE 50 MG/ML
12.5 INJECTION INTRAMUSCULAR; INTRAVENOUS
Status: DISCONTINUED | OUTPATIENT
Start: 2018-03-05 | End: 2018-03-05 | Stop reason: HOSPADM

## 2018-03-05 RX ADMIN — MIDAZOLAM 2 MG: 1 INJECTION INTRAMUSCULAR; INTRAVENOUS at 17:19

## 2018-03-05 RX ADMIN — SODIUM CHLORIDE: 900 INJECTION, SOLUTION INTRAVENOUS at 17:06

## 2018-03-05 RX ADMIN — FENTANYL CITRATE 50 MCG: 50 INJECTION, SOLUTION INTRAMUSCULAR; INTRAVENOUS at 18:12

## 2018-03-05 RX ADMIN — FENTANYL CITRATE 25 MCG: 50 INJECTION, SOLUTION INTRAMUSCULAR; INTRAVENOUS at 17:31

## 2018-03-05 RX ADMIN — FENTANYL CITRATE 25 MCG: 50 INJECTION, SOLUTION INTRAMUSCULAR; INTRAVENOUS at 18:00

## 2018-03-05 RX ADMIN — SODIUM CHLORIDE 1 G: 9 INJECTION INTRAMUSCULAR; INTRAVENOUS; SUBCUTANEOUS at 16:07

## 2018-03-05 RX ADMIN — HYDROMORPHONE HYDROCHLORIDE 0.5 MG: 1 INJECTION, SOLUTION INTRAMUSCULAR; INTRAVENOUS; SUBCUTANEOUS at 18:37

## 2018-03-05 RX ADMIN — VANCOMYCIN HYDROCHLORIDE 2000 MG: 5 INJECTION, POWDER, LYOPHILIZED, FOR SOLUTION INTRAVENOUS at 16:34

## 2018-03-05 RX ADMIN — SODIUM CHLORIDE 100 ML/HR: 900 INJECTION, SOLUTION INTRAVENOUS at 15:37

## 2018-03-05 RX ADMIN — FENTANYL CITRATE 25 MCG: 50 INJECTION, SOLUTION INTRAMUSCULAR; INTRAVENOUS at 18:21

## 2018-03-05 RX ADMIN — FENTANYL CITRATE 50 MCG: 50 INJECTION, SOLUTION INTRAMUSCULAR; INTRAVENOUS at 17:25

## 2018-03-05 RX ADMIN — LIDOCAINE HYDROCHLORIDE 100 MG: 20 INJECTION, SOLUTION INFILTRATION; PERINEURAL at 17:26

## 2018-03-05 RX ADMIN — FENTANYL CITRATE 25 MCG: 50 INJECTION, SOLUTION INTRAMUSCULAR; INTRAVENOUS at 18:16

## 2018-03-05 RX ADMIN — PROPOFOL 200 MG: 10 INJECTION, EMULSION INTRAVENOUS at 17:26

## 2018-03-05 NOTE — ANESTHESIA PROCEDURE NOTES
Airway  Date/Time: 3/5/2018 5:28 PM  End Time:3/5/2018 5:28 PM  Airway not difficult    General Information and Staff    Patient location during procedure: OR  CRNA: SAUD REYNOLDS    Indications and Patient Condition  Indications for airway management: airway protection  Mask difficulty assessment: 0 - not attempted    Final Airway Details  Final airway type: supraglottic airway      Successful airway: I-gel  Size 4    Number of attempts at approach: 1

## 2018-03-05 NOTE — H&P
[facility] Medicine Admission      Date of Admission: 3/5/2018      Primary Care Physician: Jamaal Bravo MD      Chief Complaint:  Surgery on left foot    HPI: This is a 57-year-old male with PMH of arthritis, atrial fibrillation, diabetes mellitus type 2, diabetic foot ulcers, HTN and hallux malleus of the left foot that presents to [facility] as a direct admission from podiatrist, Dr. Donna Strickland, for I&D and hardware removal of the left foot today. The patient underwent hallux IPJ fusion and hammertoe correction of digits 2 through 4 of the left foot on February 15, 2018 by Dr. Thompson.  He states over the last 2 days he's had increasing pain and drainage to the surgical area.  The patient was started on Omnicef 300 mg by mouth twice a day on 3/4/2018 by his PCP. 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  The patient states his father underwent the same procedures and ended up with amputation of both feet.    Concurrent Medical History:  has a past medical history of Arthritis; Atrial fibrillation; Diabetes mellitus; Diabetic foot ulcer associated with type 2 diabetes mellitus; Hallux malleus of left foot; Hammer toe; Hypertension; MI (myocardial infarction); Neuropathy in diabetes; and Onychomycosis.    Past Surgical History:  has a past surgical history that includes Toe amputation (Right, 2016); Carpal tunnel release (2015); Tonsilectomy, adenoidectomy, bilateral myringotomy and tubes; Foot Wound Closure (Right, 3/2/2017); Finger amputation (Right, 3/5/2017); Cardiac defibrillator placement (2010, 2016); Foot surgery; and Hammer Toe Repair (Left, 2/15/2018).    Family History: family history includes Diabetes in his father; No Known Problems in his daughter, daughter, maternal grandfather, maternal grandmother, paternal grandfather, paternal grandmother, and son; Stroke in his father.    Social  History:  reports that he has never smoked. He has never used smokeless tobacco. He reports that he does not drink alcohol or use illicit drugs.    Allergies:   Allergies   Allergen Reactions   • Penicillins Anaphylaxis   • Januvia [Sitagliptin] Hives   • Lipitor [Atorvastatin] Other (See Comments)     Muscle Cramps...   • Peanut Oil Rash   • Peanut-Containing Drug Products Rash   • Sulfa Antibiotics Rash       Medications:   Prior to Admission medications    Medication Sig Start Date End Date Taking? Authorizing Provider   aspirin 81 MG chewable tablet Chew 81 mg Every Night.    Historical Provider, MD   Cholecalciferol (VITAMIN D3) 5000 UNITS capsule capsule Take 5,000 Units by mouth Every Night.    Historical Provider, MD   Cyanocobalamin (VITAMIN B 12 PO) Take 1,000 mcg by mouth Every Night.    Historical Provider, MD   dronedarone (MULTAQ) 400 MG tablet Take 400 mg by mouth Every Night.    Historical Provider, MD   fluticasone (FLONASE) 50 MCG/ACT nasal spray 2 sprays into each nostril As Needed for Rhinitis.    Historical Provider, MD   glimepiride (AMARYL) 2 MG tablet Take 4 mg by mouth 2 (Two) Times a Day. 10/27/16   Historical Provider, MD   HYDROcodone-acetaminophen (NORCO) 7.5-325 MG per tablet Take 1 tablet by mouth Every 6 (Six) Hours As Needed for Moderate Pain . 2/15/18   Marco A Thompson DPM   magnesium oxide (MAGOX) 400 (241.3 Mg) MG tablet tablet Take 400 mg by mouth 2 (Two) Times a Day.    Historical Provider, MD   metFORMIN (GLUCOPHAGE) 1000 MG tablet Take 1,000 mg by mouth 2 (Two) Times a Day With Meals. Patient states he only uses when needed    Historical Provider, MD   metoprolol tartrate (LOPRESSOR) 25 MG tablet Take 12.5 mg by mouth Every Night.    Historical Provider, MD   ONE TOUCH ULTRA TEST test strip USE TO TEST BLOOD SUGAR THREE TIMES A DAY 8/20/16   Historical Provider, MD   valsartan (DIOVAN) 160 MG tablet Take 40 mg by mouth Every Night. 1/2 tablet daily     Historical Provider, MD    vitamin C (ASCORBIC ACID) 500 MG tablet Take 500 mg by mouth Every Night.    Historical Provider, MD   vitamin E 200 UNIT capsule Take 200 Units by mouth Every Night.    Historical Provider, MD       Review of Systems:  Review of Systems   All other systems reviewed and are negative.     Otherwise complete ROS is negative except as mentioned above.    Physical Exam:   Temp:  [98 °F (36.7 °C)] 98 °F (36.7 °C)  Heart Rate:  [80-91] 80  Resp:  [18] 18  BP: (135)/(77) 135/77  Physical Exam   Constitutional: He is oriented to person, place, and time. He appears well-developed and well-nourished.   HENT:   Head: Normocephalic and atraumatic.   Eyes: EOM are normal. Pupils are equal, round, and reactive to light.   Neck: Normal range of motion. Neck supple.   Cardiovascular: Normal rate and regular rhythm.    Pulmonary/Chest: Effort normal and breath sounds normal.   Abdominal: Soft. Bowel sounds are normal.   Musculoskeletal: Normal range of motion.   Neurological: He is alert and oriented to person, place, and time.   Skin:   Left lower extremity shows mild erythema and edema.    Psychiatric: He has a normal mood and affect.     Results Reviewed:  I have personally reviewed current lab, radiology, and data and agree with results.  Lab Results (last 24 hours)     ** No results found for the last 24 hours. **        Imaging Results (last 24 hours)     ** No results found for the last 24 hours. **          Active Hospital Problems (** Indicates Principal Problem)    Diagnosis Date Noted   • Cellulitis of left foot [L03.116] 03/05/2018     Added automatically from request for surgery [id number]     • Infected hardware in left leg [T84.7XXA] 03/05/2018     Added automatically from request for surgery [id number]        Resolved Hospital Problems    Diagnosis Date Noted Date Resolved   No resolved problems to display.           Plan:  1.  Cellulitis, left foot:  Surgery planned today with Dr. Thompson for I&D and hardware removal of  the left foot.  Ultrasound of the LLE is ordered. Wound culture pending.   Vancomycin, aztreonam and flagyl ordered for triple coverage.     2.  History of atrial fibrillation:  Will order continuous telemetry to monitor.  Continue Multaq.   3.  Diabetes mellitus, type II:  Continue glipizide and metformin.  SSI initiated.      I discussed the patients findings and my recommendations with: Patient.       This document has been electronically signed by WALTER Romero on March 5, 2018 2:22 PM

## 2018-03-05 NOTE — PLAN OF CARE
Problem: Patient Care Overview (Adult)  Goal: Plan of Care Review  Outcome: Ongoing (interventions implemented as appropriate)   03/05/18 1447   Coping/Psychosocial Response Interventions   Plan Of Care Reviewed With patient   Patient Care Overview   Progress no change   Outcome Evaluation   Outcome Summary/Follow up Plan pt. new admission; will have surgery today with Dr. Thompson     Goal: Adult Individualization and Mutuality  Outcome: Ongoing (interventions implemented as appropriate)    Goal: Discharge Needs Assessment  Outcome: Ongoing (interventions implemented as appropriate)      Problem: Pain, Acute (Adult)  Goal: Identify Related Risk Factors and Signs and Symptoms  Outcome: Outcome(s) achieved Date Met: 03/05/18    Goal: Acceptable Pain Control/Comfort Level  Outcome: Ongoing (interventions implemented as appropriate)

## 2018-03-05 NOTE — PROGRESS NOTES
Emre Lisa  1960  57 y.o. male     Patient presents today for post op recheck of his left foot. Patient is also having an ultrasound of his LLE today.    03/05/18  Chief Complaint   Patient presents with   • Left Foot - post op recheck           History of Present Illness    Patient presents to clinic today for his third postoperative visit.  He had hallux IPJ fusion and hammertoe correction digits 2 -4 left foot DOS February 15 2018. He relates that for the last two days he has had increasing pain and drainage to the surgical area. He denies getting the dressing wet. He denies any injuries to the area. He denies subjective fevers and chills. Does relate to increasing swelling, redness and drainage. He his currently on an antibiotic prescribed by his PCP.     Past Medical History:   Diagnosis Date   • Arthritis    • Atrial fibrillation    • Diabetes mellitus    • Diabetic foot ulcer associated with type 2 diabetes mellitus     left great toe   • Hallux malleus of left foot    • Hammer toe    • Hypertension    • MI (myocardial infarction)    • Neuropathy in diabetes    • Onychomycosis          Past Surgical History:   Procedure Laterality Date   • AMPUTATION DIGIT Right 3/5/2017    Procedure: RIGHT AMPUTATION TRANSMETATRASAL , RECESSION GASTROCNEMOUS;  Surgeon: Maroc A Thompson DPM;  Location: [facility] OR;  Service:    • CARDIAC DEFIBRILLATOR PLACEMENT  2010, 2016   • CARPAL TUNNEL RELEASE  2015    elbow and wrist left arm   • FOOT SURGERY     • FOOT WOUND CLOSURE Right 3/2/2017    Procedure: INCISION AND DRAINAGE, RIGHT FOOT;  Surgeon: Tung Rosenthal DPM;  Location: [facility] OR;  Service:    • HAMMER TOE REPAIR Left 2/15/2018    Procedure: HAMMERTOE CORRECTION LEFT SECOND, THIRD AND FOURTH TOES AND HALLUX INTERPHALANGEAL JOINT ARTHRODESIS LEFT FOOT (Micro Asnis, 4.0 & 5.0 Asnis)      (c-arm);  Surgeon: Marco A Thompson DPM;  Location: [facility] OR;  Service:    • TOE AMPUTATION Right 2016   • TONSILECTOMY,  ADENOIDECTOMY, BILATERAL MYRINGOTOMY AND TUBES      age 25         Family History   Problem Relation Age of Onset   • Diabetes Father    • Stroke Father    • No Known Problems Maternal Grandmother    • No Known Problems Maternal Grandfather    • No Known Problems Paternal Grandmother    • No Known Problems Paternal Grandfather    • No Known Problems Son    • No Known Problems Daughter    • No Known Problems Daughter          Social History     Social History   • Marital status:      Spouse name: N/A   • Number of children: N/A   • Years of education: N/A     Occupational History   • Not on file.     Social History Main Topics   • Smoking status: Never Smoker   • Smokeless tobacco: Never Used   • Alcohol use No   • Drug use: No   • Sexual activity: Defer     Other Topics Concern   • Not on file     Social History Narrative         Current Outpatient Prescriptions   Medication Sig Dispense Refill   • aspirin 81 MG chewable tablet Chew 81 mg Every Night.     • Cholecalciferol (VITAMIN D3) 5000 UNITS capsule capsule Take 5,000 Units by mouth Every Night.     • Cyanocobalamin (VITAMIN B 12 PO) Take 1,000 mcg by mouth Every Night.     • dronedarone (MULTAQ) 400 MG tablet Take 400 mg by mouth Every Night.     • fluticasone (FLONASE) 50 MCG/ACT nasal spray 2 sprays into each nostril As Needed for Rhinitis.     • glimepiride (AMARYL) 2 MG tablet Take 4 mg by mouth 2 (Two) Times a Day.     • HYDROcodone-acetaminophen (NORCO) 7.5-325 MG per tablet Take 1 tablet by mouth Every 6 (Six) Hours As Needed for Moderate Pain . 30 tablet 0   • magnesium oxide (MAGOX) 400 (241.3 Mg) MG tablet tablet Take 400 mg by mouth 2 (Two) Times a Day.     • metFORMIN (GLUCOPHAGE) 1000 MG tablet Take 1,000 mg by mouth 2 (Two) Times a Day With Meals. Patient states he only uses when needed     • metoprolol tartrate (LOPRESSOR) 25 MG tablet Take 12.5 mg by mouth Every Night.     • ONE TOUCH ULTRA TEST test strip USE TO TEST BLOOD SUGAR THREE  "TIMES A DAY  1   • valsartan (DIOVAN) 160 MG tablet Take 40 mg by mouth Every Night. 1/2 tablet daily      • vitamin C (ASCORBIC ACID) 500 MG tablet Take 500 mg by mouth Every Night.     • vitamin E 200 UNIT capsule Take 200 Units by mouth Every Night.       No current facility-administered medications for this visit.            OBJECTIVE    Pulse 91  Ht 193 cm (76\")  Wt 131 kg (288 lb)  SpO2 98%  BMI 35.06 kg/m2    Review of Systems   Constitutional: Negative for chills and fever.   Cardiovascular: Negative for chest pain.   Gastrointestinal: Negative for constipation, diarrhea, nausea and vomiting.    Musculoskeletal:  Left foot pain, welling and drainage    Constitutional: well developed, well nourished    HEENT: Normocephalic and atraumatic, normal hearing    Respiratory: Non labored respirations noted    LLE: Dehiscence noted to the dorsal left hallux incision site.  There is drainage, streaking erythema to the midfoot and increased edema noted when compared to last postoperative visit.  Mild pain with compression of the gastrocnemius muscle.  Second, third and fourth digit incision sites are well approximated with no signs of dehiscence noted.  No drainage or erythema noted to the lesser digits.    Psychiatric: A&O x 3 with normal mood and affect. NAD.       Procedures        ASSESSMENT AND PLAN    Emre was seen today for post op recheck.    Diagnoses and all orders for this visit:    Cellulitis of left foot  -     XR Foot 3+ View Left  -     Case Request; Standing  -     Case Request    Infected hardware in left lower extremity, initial encounter  -     Case Request; Standing  -     Case Request    Hallux malleus of left foot    Hammer toe of left foot    Other orders  -     Follow Anesthesia Guidelines / Standing Orders; Standing  -     Verify NPO Status; Standing  -     Obtain informed consent (if not collected inpatient or PAT); Standing  -     Notify Physician - Standard; Standing  -     Follow " Anesthesia Guidelines / Standing Orders; Future    - x-rays taken and reviewed, gapping noted at arthrodesis site to the hallux IPJ, no soft tissue emphysema  - pt was directly admitted to hospital  - plan for I&D and HWR left foot today. Risks and benefits were discussed in detail. No guarantees given or implied.   - npo  - All his questions were answered          This document has been electronically signed by Marco A Thompson DPM on March 5, 2018 12:50 PM

## 2018-03-05 NOTE — ANESTHESIA PREPROCEDURE EVALUATION
Anesthesia Evaluation     Patient summary reviewed and Nursing notes reviewed   no history of anesthetic complications:  NPO Solid Status: > 8 hours  NPO Liquid Status: > 8 hours           Airway   Mallampati: II  TM distance: >3 FB  Neck ROM: full  Possible difficult intubation  Comment: His neck has decreased extension as well as decreased ability to turn to left or right  Has a beard  Dental - normal exam   (+) poor dentation    Pulmonary - negative pulmonary ROS and normal exam    breath sounds clear to auscultation    PE comment: AICD is in left chest wall  Cardiovascular - normal exam  Exercise tolerance: good (4-7 METS)    ECG reviewed  Patient on routine beta blocker and Beta blocker given within 24 hours of surgery  Rhythm: regular  Rate: normal    (+) pacemaker ( originally inserted in 2010 and replaced 7/2016. Has not been discharged since placement.) ICD, pacemaker, hypertension well controlled, past MI ( he states he had a silent MI 30 years ago and had clean coronary arteries in a 2010 catheterization.)  >12 months, dysrhythmias ( currently in NSR, controlled with metoprolol and multaq) Atrial Fib, DVT (possible DVT on surgical side),   (-) murmur    ROS comment: History cardiac defibrillator    Normal sinus rhythm  Normal ECG  When compared with ECG of 07-NOV-2016 16:29,  premature ventricular complexes are no longer present  Confirmed by EMELINA    Neuro/Psych- negative ROS  GI/Hepatic/Renal/Endo    (+) obesity,   diabetes mellitus (glu 86 this am) type 2 well controlled,   GERD:  he is on protonix= ordered pre-op, but he denies having GERD.    Musculoskeletal     (+) arthralgias,       ROS comment: Recent hammer toe surgery left foot  Abdominal   (+) obese,    Substance History - negative use     OB/GYN negative ob/gyn ROS         Other   (+) arthritis                       Anesthesia Plan    ASA 3 - emergent     general     intravenous induction   Anesthetic plan and risks discussed with  patient.

## 2018-03-06 LAB
ALBUMIN SERPL-MCNC: 3.5 G/DL (ref 3.4–4.8)
ALBUMIN/GLOB SERPL: 1 G/DL (ref 1.1–1.8)
ALP SERPL-CCNC: 53 U/L (ref 38–126)
ALT SERPL W P-5'-P-CCNC: 30 U/L (ref 21–72)
ANION GAP SERPL CALCULATED.3IONS-SCNC: 9 MMOL/L (ref 5–15)
AST SERPL-CCNC: 27 U/L (ref 17–59)
BASOPHILS # BLD AUTO: 0.01 10*3/MM3 (ref 0–0.2)
BASOPHILS NFR BLD AUTO: 0.2 % (ref 0–2)
BILIRUB SERPL-MCNC: 0.6 MG/DL (ref 0.2–1.3)
BUN BLD-MCNC: 19 MG/DL (ref 7–21)
BUN/CREAT SERPL: 22.1 (ref 7–25)
CALCIUM SPEC-SCNC: 8.5 MG/DL (ref 8.4–10.2)
CHLORIDE SERPL-SCNC: 102 MMOL/L (ref 95–110)
CO2 SERPL-SCNC: 28 MMOL/L (ref 22–31)
CREAT BLD-MCNC: 0.86 MG/DL (ref 0.7–1.3)
DEPRECATED RDW RBC AUTO: 42.7 FL (ref 35.1–43.9)
EOSINOPHIL # BLD AUTO: 0.17 10*3/MM3 (ref 0–0.7)
EOSINOPHIL NFR BLD AUTO: 3.6 % (ref 0–7)
ERYTHROCYTE [DISTWIDTH] IN BLOOD BY AUTOMATED COUNT: 12.9 % (ref 11.5–14.5)
GFR SERPL CREATININE-BSD FRML MDRD: 92 ML/MIN/1.73 (ref 60–130)
GLOBULIN UR ELPH-MCNC: 3.5 GM/DL (ref 2.3–3.5)
GLUCOSE BLD-MCNC: 82 MG/DL (ref 60–100)
GLUCOSE BLDC GLUCOMTR-MCNC: 77 MG/DL (ref 70–130)
GLUCOSE BLDC GLUCOMTR-MCNC: 81 MG/DL (ref 70–130)
GLUCOSE BLDC GLUCOMTR-MCNC: 82 MG/DL (ref 70–130)
GLUCOSE BLDC GLUCOMTR-MCNC: 96 MG/DL (ref 70–130)
HCT VFR BLD AUTO: 37.8 % (ref 39–49)
HGB BLD-MCNC: 12.6 G/DL (ref 13.7–17.3)
IMM GRANULOCYTES # BLD: 0.01 10*3/MM3 (ref 0–0.02)
IMM GRANULOCYTES NFR BLD: 0.2 % (ref 0–0.5)
LYMPHOCYTES # BLD AUTO: 1.24 10*3/MM3 (ref 0.6–4.2)
LYMPHOCYTES NFR BLD AUTO: 26.6 % (ref 10–50)
MCH RBC QN AUTO: 29.9 PG (ref 26.5–34)
MCHC RBC AUTO-ENTMCNC: 33.3 G/DL (ref 31.5–36.3)
MCV RBC AUTO: 89.8 FL (ref 80–98)
MONOCYTES # BLD AUTO: 0.52 10*3/MM3 (ref 0–0.9)
MONOCYTES NFR BLD AUTO: 11.2 % (ref 0–12)
NEUTROPHILS # BLD AUTO: 2.71 10*3/MM3 (ref 2–8.6)
NEUTROPHILS NFR BLD AUTO: 58.2 % (ref 37–80)
PLATELET # BLD AUTO: 152 10*3/MM3 (ref 150–450)
PMV BLD AUTO: 9.7 FL (ref 8–12)
POTASSIUM BLD-SCNC: 4.4 MMOL/L (ref 3.5–5.1)
PROT SERPL-MCNC: 7 G/DL (ref 6.3–8.6)
RBC # BLD AUTO: 4.21 10*6/MM3 (ref 4.37–5.74)
SODIUM BLD-SCNC: 139 MMOL/L (ref 137–145)
WBC NRBC COR # BLD: 4.66 10*3/MM3 (ref 3.2–9.8)

## 2018-03-06 PROCEDURE — 25010000002 VANCOMYCIN PER 500 MG: Performed by: NURSE PRACTITIONER

## 2018-03-06 PROCEDURE — 80053 COMPREHEN METABOLIC PANEL: CPT | Performed by: NURSE PRACTITIONER

## 2018-03-06 PROCEDURE — 85025 COMPLETE CBC W/AUTO DIFF WBC: CPT | Performed by: NURSE PRACTITIONER

## 2018-03-06 PROCEDURE — 82962 GLUCOSE BLOOD TEST: CPT

## 2018-03-06 PROCEDURE — 99024 POSTOP FOLLOW-UP VISIT: CPT | Performed by: PODIATRIST

## 2018-03-06 RX ADMIN — METRONIDAZOLE 500 MG: 500 INJECTION, SOLUTION INTRAVENOUS at 18:06

## 2018-03-06 RX ADMIN — SODIUM CHLORIDE 100 ML/HR: 900 INJECTION, SOLUTION INTRAVENOUS at 22:03

## 2018-03-06 RX ADMIN — MAGNESIUM OXIDE TAB 400 MG (241.3 MG ELEMENTAL MG) 400 MG: 400 (241.3 MG) TAB at 08:32

## 2018-03-06 RX ADMIN — OXYCODONE HYDROCHLORIDE AND ACETAMINOPHEN 500 MG: 500 TABLET ORAL at 03:06

## 2018-03-06 RX ADMIN — OXYCODONE HYDROCHLORIDE AND ACETAMINOPHEN 500 MG: 500 TABLET ORAL at 20:09

## 2018-03-06 RX ADMIN — METOPROLOL TARTRATE 12.5 MG: 25 TABLET, FILM COATED ORAL at 20:08

## 2018-03-06 RX ADMIN — GLIPIZIDE 5 MG: 5 TABLET ORAL at 08:31

## 2018-03-06 RX ADMIN — MAGNESIUM OXIDE TAB 400 MG (241.3 MG ELEMENTAL MG) 400 MG: 400 (241.3 MG) TAB at 20:08

## 2018-03-06 RX ADMIN — METRONIDAZOLE 500 MG: 500 INJECTION, SOLUTION INTRAVENOUS at 03:19

## 2018-03-06 RX ADMIN — MAGNESIUM OXIDE TAB 400 MG (241.3 MG ELEMENTAL MG) 400 MG: 400 (241.3 MG) TAB at 03:06

## 2018-03-06 RX ADMIN — VALSARTAN 40 MG: 40 TABLET, FILM COATED ORAL at 20:09

## 2018-03-06 RX ADMIN — VANCOMYCIN HYDROCHLORIDE 2000 MG: 5 INJECTION, POWDER, LYOPHILIZED, FOR SOLUTION INTRAVENOUS at 14:57

## 2018-03-06 RX ADMIN — VANCOMYCIN HYDROCHLORIDE 2000 MG: 5 INJECTION, POWDER, LYOPHILIZED, FOR SOLUTION INTRAVENOUS at 04:46

## 2018-03-06 RX ADMIN — METRONIDAZOLE 500 MG: 500 INJECTION, SOLUTION INTRAVENOUS at 08:30

## 2018-03-06 RX ADMIN — ACETAMINOPHEN 650 MG: 325 TABLET ORAL at 20:07

## 2018-03-06 RX ADMIN — METFORMIN HYDROCHLORIDE 1000 MG: 500 TABLET ORAL at 08:31

## 2018-03-06 RX ADMIN — VITAMIN E CAP 100 UNIT 200 UNITS: 100 CAP at 03:06

## 2018-03-06 RX ADMIN — VITAMIN E CAP 100 UNIT 200 UNITS: 100 CAP at 20:10

## 2018-03-06 RX ADMIN — METOPROLOL TARTRATE 12.5 MG: 25 TABLET, FILM COATED ORAL at 03:06

## 2018-03-06 RX ADMIN — ACETAMINOPHEN 650 MG: 325 TABLET ORAL at 02:16

## 2018-03-06 RX ADMIN — VALSARTAN 40 MG: 40 TABLET, FILM COATED ORAL at 03:06

## 2018-03-06 RX ADMIN — SODIUM CHLORIDE 100 ML/HR: 900 INJECTION, SOLUTION INTRAVENOUS at 03:07

## 2018-03-06 RX ADMIN — SODIUM CHLORIDE 1 G: 9 INJECTION INTRAMUSCULAR; INTRAVENOUS; SUBCUTANEOUS at 08:29

## 2018-03-06 RX ADMIN — SODIUM CHLORIDE 1 G: 9 INJECTION INTRAMUSCULAR; INTRAVENOUS; SUBCUTANEOUS at 03:06

## 2018-03-06 RX ADMIN — DRONEDARONE 400 MG: 400 TABLET, FILM COATED ORAL at 20:08

## 2018-03-06 RX ADMIN — SODIUM CHLORIDE 1 G: 9 INJECTION INTRAMUSCULAR; INTRAVENOUS; SUBCUTANEOUS at 14:57

## 2018-03-06 NOTE — ANESTHESIA POSTPROCEDURE EVALUATION
Patient: Emre Lisa    Procedure Summary     Date Anesthesia Start Anesthesia Stop Room / Location    03/05/18 1724 [id number]  [facility] / [facility]       Procedure Diagnosis Surgeon Provider    INCISION AND DRAINAGE LOWER EXTREMITY (Left ); ANKLE/FOOT HARDWARE REMOVAL (Left Ankle) Cellulitis of left foot; Infected hardware in left lower extremity, initial encounter  (Cellulitis of left foot [L03.116]; Infected hardware in left lower extremity, initial encounter [T84.7XXA]) CHRISTIAN Shafer MD          Anesthesia Type: general  Last vitals  BP   135/86 (03/05/18 1553)   Temp   98.6 °F (37 °C) (03/05/18 1553)   Pulse   73 (03/05/18 1611)   Resp   20 (03/05/18 1553)     SpO2   97 % (03/05/18 1553)     Post Anesthesia Care and Evaluation    Patient location during evaluation: PACU  Level of consciousness: awake and sleepy but conscious  Pain score: 0  Pain management: adequate  Airway patency: patent  Anesthetic complications: No anesthetic complications  PONV Status: none  Cardiovascular status: acceptable  Respiratory status: acceptable  Hydration status: acceptable

## 2018-03-06 NOTE — CONSULTS
Adult Nutrition  Assessment    Patient Name:  Emre Lisa  YOB: 1960  MRN: [id number]  Admit Date:  3/5/2018    Assessment Date:  3/6/2018    Comments:  Nrsg asked RD to speak w/pt re: ADA diet. Pt has had intentional wt loss of 100# in effort to control DM and he follows very low carbohydrate diet at home. Pt concerned he gets too many carbs on his tray. RD reviewed menu/alternatives and recorded food prefs. Spoke w/CA about pt's preferences.           Reason for Assessment       03/06/18 1107    Reason for Assessment    Reason For Assessment/Visit patient request                Nutrition/Diet History       03/06/18 1107    Nutrition/Diet History    Patient Reported Diet/Restrictions/Preferences diabetic    Typical Food/Fluid Intake Pt reports intentional wt loss of 100# in effort to stay off DM meds. He follows a very carb restriced diet and wanted RD to help him w/menu options and communication w/CA.                         Electronically signed by:  Alesha Blood RD  03/06/18 11:10 AM

## 2018-03-06 NOTE — PROGRESS NOTES
[facility]  INPATIENT PROGRESS NOTE    Length of Stay: 1  Date of Admission: 3/5/2018  Primary Care Physician: Jamaal Bravo MD    Subjective     No chief complaint on file.      HPI:  Patient was seen and examined this morning. Patient states that pain in R foot is better controlled.  Patient is tolerating diet , passing gas, Patient denies fever or chills, headache or dizziness, chest pain or palpitations, difficulty in breathing or cough, abdominal pain N/V/D , blood in the urine or blood in the stool , or any urinary symptoms , weakness or numbness or tingling in the extremities or visual changes.    Review of Systems     All pertinent negatives and positives are as above. All other systems have been reviewed and are negative unless otherwise stated.   Objective    Physical exam    Temp:  [96.7 °F (35.9 °C)-98.9 °F (37.2 °C)] 98.4 °F (36.9 °C)  Heart Rate:  [70-80] 75  Resp:  [16-18] 18  BP: (112-143)/(63-82) 133/80    -Constitutional: Patient is AAO x 3. Patient appears well-developed and well-nourished.   -CVS: Normal rate, regular rhythm, normal heart sounds and intact distal pulses.  Exam reveals no gallop and no friction rub.  No murmur heard.  -Pulm: Effort normal and breath sounds normal. No respiratory distress. No wheezes, rales or ronchi.  -Abdominal: Soft. Bowel sounds are normal. No distension, no tenderness. There is no rebound and no guarding. No hernia.   -Musculoskeletal: No Cyanosis clubbing . Wound is wrapped with no drainage .    Results Review:    Laboratory Data:     Results from last 7 days  Lab Units 03/06/18  0727 03/05/18  1519   SODIUM mmol/L 139 138   POTASSIUM mmol/L 4.4 4.4   CHLORIDE mmol/L 102 100   CO2 mmol/L 28.0 25.0   BUN mg/dL 19 23*   CREATININE mg/dL 0.86 0.75   GLUCOSE mg/dL 82 77   CALCIUM mg/dL 8.5 8.4   BILIRUBIN mg/dL 0.6 0.5   ALK PHOS U/L 53 55   ALT (SGPT) U/L 30 39   AST (SGOT) U/L 27 21   ANION GAP  mmol/L 9.0 13.0     Estimated Creatinine Clearance: 142.8 mL/min (by C-G formula based on Cr of 0.86).            Results from last 7 days  Lab Units 03/06/18  0727 03/05/18  1519   WBC 10*3/mm3 4.66 4.88   HEMOGLOBIN g/dL 12.6* 12.4*   HEMATOCRIT % 37.8* 36.8*   PLATELETS 10*3/mm3 152 146*           Culture Data:   No results found for: BLOODCX  No results found for: URINECX  No results found for: RESPCX  Wound Culture   Date Value Ref Range Status   03/05/2018 Culture in progress  Preliminary     No results found for: STOOLCX  No components found for: BODYFLD    Micobiology                       Wound Culture   Date Value Ref Range Status   03/05/2018 Culture in progress  Preliminary       Radiology Data:   Imaging Results (all)     Procedure Component Value Units Date/Time    US Venous Doppler Lower Extremity Left (duplex) [[id number]] Collected:  03/05/18 1420     Updated:  03/05/18 1501    Narrative:         Ultrasound venous duplex left lower extremity    HISTORY: Edema . Cellulitis.    Duplex ultrasound of the deep venous system of the left lower  extremity was performed    Real-time images demonstrate normal compressibility without  evidence of intraluminal thrombus.  Doppler shows phasic flow and augmentation.  Color Doppler also reveals venous patency.      Impression:       CONCLUSION:  No ultrasound evidence of deep venous thrombosis of the left  lower extremity.    [id number]    Electronically signed by:  Matias Waller MD  3/5/2018 3:00 PM CST  Workstation: [employer] C Arm During Surgery [[id number]] Resulted:  03/06/18 0820     Updated:  03/06/18 0820          I have reviewed the patient current medications.   Assessment/Plan     Hospital Problem List     Cellulitis of left foot    Overview Signed 3/5/2018  1:17 PM by Marco A Thompson DPM     Added automatically from request for surgery [id number]         Infected hardware in left leg    Overview Signed 3/5/2018  1:17 PM by Marco A Thompson DPM      Added automatically from request for surgery [id number]               Assessment / Plan    --Cellulitis, left foot:   S/p I&d and had ankle/hardware removal of LLE by Dr Thompson .  Afebrile, normal WBC.  vanc,aztreonam and flagyl. cultures pending.     --History of atrial fibrillation:    Rate controlled. Telemetry. continue home meds.    --Diabetes mellitus, type II:    Continue glipizide .metformin held.  SSI initiated.    --HTN  diovan     This document has been electronically signed by Silver Diggs MD on March 6, 2018 4:18 PM

## 2018-03-06 NOTE — PROGRESS NOTES
"Pharmacokinetics by Pharmacy - Vancomycin Initial Consult    Emre Lisa is a 57 y.o. male being initiated on vancomycin for diabetic foot cellulitis . Patient is also receiving aztreonam , Flagyl.      Objective:     [Ht: 193 cm (75.98\"); Wt: 131 kg (288 lb)]     Estimated Creatinine Clearance: 142.8 mL/min (by C-G formula based on Cr of 0.86).   Lab Results   Component Value Date    CREATININE 0.86 03/06/2018    CREATININE 0.75 03/05/2018    CREATININE 0.97 02/09/2018      Lab Results   Component Value Date    WBC 4.66 03/06/2018    WBC 4.88 03/05/2018    WBC 6.46 03/09/2017      Temp Readings from Last 1 Encounters:   03/06/18 98.4 °F (36.9 °C) (Oral)       Baseline culture results:  Microbiology Results (last 10 days)       Procedure Component Value - Date/Time      Hardware / Foreign Body Culture - Hardware / Foreign Body, Foot, Left [[id number]] Collected:  03/05/18 2127    Lab Status:  Preliminary result Specimen:  Hardware / Foreign Body from Foot, Left Updated:  03/06/18 0109     Gram Stain Result Rare (1+) WBCs seen      No No organisms seen    Tissue / Bone Culture - Bone, Foot, Left [[id number]]  (Normal) Collected:  03/05/18 1804    Lab Status:  Preliminary result Specimen:  Bone from Foot, Left Updated:  03/06/18 0623     Tissue Culture Culture in progress     Gram Stain Result Few (2+) WBCs seen      Few (2+) Gram positive cocci in pairs    Anaerobic Culture - Wound, Foot, Left [[id number]] Collected:  03/05/18 1751    Lab Status:  Preliminary result Specimen:  Wound from Foot, Left Updated:  03/05/18 2305     Gram Stain Result Rare (1+) WBCs seen      No organisms seen    Wound Culture - Wound, Foot, Left [[id number]]  (Normal) Collected:  03/05/18 1751    Lab Status:  Preliminary result Specimen:  Wound from Foot, Left Updated:  03/06/18 0647     Wound Culture Culture in progress     Gram Stain Result Rare (1+) Gram positive cocci in pairs      Few (2+) WBCs seen           "     Assessment  Diabetic foot wound requiring antibiotic care.   Goal vancomycin AUC/trough: goal trough 10-15.  Patient may be a good candidate for de-escalation.  Multiple cultures showing gram positive cocci in pairs and patient does not appear to be systemically ill.  Recommend to deescalate to vancomycin by itself as gram negative and anaerobic coverage is probably not needed with current clinical picture.  Patient has good renal function (SCr stable, I/O stable), WBC within normal limits.    Plan  1. Give vancomycin 2000mg IV x 1 followed by vancomycin 2000mg IV Q12  2. Will order vancomycin trough prior to the 4th dose  3. Pharmacy will monitor renal function and adjust dose accordingly.    Sandor Meléndez III, [facility]  03/06/18 12:55 PM

## 2018-03-06 NOTE — BRIEF OP NOTE
INCISION AND DRAINAGE LOWER EXTREMITY, ANKLE/FOOT HARDWARE REMOVAL  Progress Note    Emre Lisa  3/5/2018    Pre-op Diagnosis:   Cellulitis of left foot [L03.116]  Infected hardware in left lower extremity, initial encounter [T84.7XXA]       Post-Op Diagnosis Codes:     * Cellulitis of left foot [L03.116]     * Infected hardware in left lower extremity, initial encounter [T84.7XXA]    Procedure/CPT® Codes:      Procedure(s):  INCISION AND DRAINAGE LOWER EXTREMITY  ANKLE/FOOT HARDWARE REMOVAL  Bone biopsy left hallux    Surgeon(s):  Marco A Thompson DPM    Anesthesia: Choice    Staff:   Circulator: Galina Godfrey RN  Scrub Person: Ines Jeronimo    Estimated Blood Loss: minimal    Urine Voided: * No values recorded between 3/5/2018  5:21 PM and 3/5/2018  6:26 PM *    Specimens:                  ID Type Source Tests Collected by Time Destination   1 : aerobic wound culture Wound Foot, Left WOUND CULTURE Marco A Thompson DPM 3/5/2018 1751    2 : anaerobic wound culture Wound Foot, Left ANAEROBIC CULTURE Marco A Thompson DPM 3/5/2018 1751    3 : culture bone left foot Bone Foot, Left TISSUE / BONE CULTURE Marco A Thompson DPM 3/5/2018 1804    4 : culture old screw left foot Tissue Foot, Left TISSUE / BONE CULTURE Marco A Thompson DPM 3/5/2018 1806          Drains:           Findings: small pocket of infection noted     Complications: none      Marco A Thompson DPM     Date: 3/5/2018  Time: 6:26 PM

## 2018-03-07 ENCOUNTER — ANESTHESIA (OUTPATIENT)
Dept: PERIOP | Facility: HOSPITAL | Age: 58
End: 2018-03-07

## 2018-03-07 ENCOUNTER — ANESTHESIA EVENT (OUTPATIENT)
Dept: PERIOP | Facility: HOSPITAL | Age: 58
End: 2018-03-07

## 2018-03-07 LAB
ALBUMIN SERPL-MCNC: 3.5 G/DL (ref 3.4–4.8)
ALBUMIN/GLOB SERPL: 1 G/DL (ref 1.1–1.8)
ALP SERPL-CCNC: 54 U/L (ref 38–126)
ALT SERPL W P-5'-P-CCNC: 35 U/L (ref 21–72)
ANION GAP SERPL CALCULATED.3IONS-SCNC: 14 MMOL/L (ref 5–15)
AST SERPL-CCNC: 37 U/L (ref 17–59)
BACTERIA SPEC ANAEROBE CULT: NORMAL
BASOPHILS # BLD AUTO: 0.03 10*3/MM3 (ref 0–0.2)
BASOPHILS NFR BLD AUTO: 0.6 % (ref 0–2)
BILIRUB SERPL-MCNC: 0.4 MG/DL (ref 0.2–1.3)
BUN BLD-MCNC: 19 MG/DL (ref 7–21)
BUN/CREAT SERPL: 20.9 (ref 7–25)
CALCIUM SPEC-SCNC: 8.8 MG/DL (ref 8.4–10.2)
CHLORIDE SERPL-SCNC: 104 MMOL/L (ref 95–110)
CO2 SERPL-SCNC: 25 MMOL/L (ref 22–31)
CREAT BLD-MCNC: 0.91 MG/DL (ref 0.7–1.3)
DEPRECATED RDW RBC AUTO: 40.7 FL (ref 35.1–43.9)
EOSINOPHIL # BLD AUTO: 0.19 10*3/MM3 (ref 0–0.7)
EOSINOPHIL NFR BLD AUTO: 4.1 % (ref 0–7)
ERYTHROCYTE [DISTWIDTH] IN BLOOD BY AUTOMATED COUNT: 12.6 % (ref 11.5–14.5)
GFR SERPL CREATININE-BSD FRML MDRD: 86 ML/MIN/1.73 (ref 56–130)
GLOBULIN UR ELPH-MCNC: 3.6 GM/DL (ref 2.3–3.5)
GLUCOSE BLD-MCNC: 97 MG/DL (ref 60–100)
GLUCOSE BLDC GLUCOMTR-MCNC: 108 MG/DL (ref 70–130)
GLUCOSE BLDC GLUCOMTR-MCNC: 91 MG/DL (ref 70–130)
GLUCOSE BLDC GLUCOMTR-MCNC: 96 MG/DL (ref 70–130)
GLUCOSE BLDC GLUCOMTR-MCNC: 99 MG/DL (ref 70–130)
GRAM STN SPEC: NORMAL
GRAM STN SPEC: NORMAL
HCT VFR BLD AUTO: 39.5 % (ref 39–49)
HGB BLD-MCNC: 13.2 G/DL (ref 13.7–17.3)
IMM GRANULOCYTES # BLD: 0.01 10*3/MM3 (ref 0–0.02)
IMM GRANULOCYTES NFR BLD: 0.2 % (ref 0–0.5)
LYMPHOCYTES # BLD AUTO: 1.51 10*3/MM3 (ref 0.6–4.2)
LYMPHOCYTES NFR BLD AUTO: 32.3 % (ref 10–50)
MCH RBC QN AUTO: 29.4 PG (ref 26.5–34)
MCHC RBC AUTO-ENTMCNC: 33.4 G/DL (ref 31.5–36.3)
MCV RBC AUTO: 88 FL (ref 80–98)
MONOCYTES # BLD AUTO: 0.54 10*3/MM3 (ref 0–0.9)
MONOCYTES NFR BLD AUTO: 11.5 % (ref 0–12)
NEUTROPHILS # BLD AUTO: 2.4 10*3/MM3 (ref 2–8.6)
NEUTROPHILS NFR BLD AUTO: 51.3 % (ref 37–80)
PLATELET # BLD AUTO: 156 10*3/MM3 (ref 150–450)
PMV BLD AUTO: 9.8 FL (ref 8–12)
POTASSIUM BLD-SCNC: 4.3 MMOL/L (ref 3.5–5.1)
PROT SERPL-MCNC: 7.1 G/DL (ref 6.3–8.6)
RBC # BLD AUTO: 4.49 10*6/MM3 (ref 4.37–5.74)
SODIUM BLD-SCNC: 143 MMOL/L (ref 137–145)
VANCOMYCIN TROUGH SERPL-MCNC: 12.24 MCG/ML (ref 10–15)
WBC NRBC COR # BLD: 4.68 10*3/MM3 (ref 3.2–9.8)

## 2018-03-07 PROCEDURE — 87176 TISSUE HOMOGENIZATION CULTR: CPT | Performed by: INTERNAL MEDICINE

## 2018-03-07 PROCEDURE — 87077 CULTURE AEROBIC IDENTIFY: CPT | Performed by: INTERNAL MEDICINE

## 2018-03-07 PROCEDURE — 87147 CULTURE TYPE IMMUNOLOGIC: CPT | Performed by: INTERNAL MEDICINE

## 2018-03-07 PROCEDURE — 80202 ASSAY OF VANCOMYCIN: CPT | Performed by: INTERNAL MEDICINE

## 2018-03-07 PROCEDURE — 93010 ELECTROCARDIOGRAM REPORT: CPT | Performed by: INTERNAL MEDICINE

## 2018-03-07 PROCEDURE — 93005 ELECTROCARDIOGRAM TRACING: CPT | Performed by: NURSE PRACTITIONER

## 2018-03-07 PROCEDURE — 25010000002 MIDAZOLAM PER 1 MG: Performed by: NURSE ANESTHETIST, CERTIFIED REGISTERED

## 2018-03-07 PROCEDURE — 87070 CULTURE OTHR SPECIMN AEROBIC: CPT | Performed by: INTERNAL MEDICINE

## 2018-03-07 PROCEDURE — 25010000002 FENTANYL CITRATE (PF) 100 MCG/2ML SOLUTION: Performed by: NURSE ANESTHETIST, CERTIFIED REGISTERED

## 2018-03-07 PROCEDURE — 25010000002 VANCOMYCIN PER 500 MG: Performed by: NURSE PRACTITIONER

## 2018-03-07 PROCEDURE — 87205 SMEAR GRAM STAIN: CPT | Performed by: INTERNAL MEDICINE

## 2018-03-07 PROCEDURE — 0QBR0ZZ EXCISION OF LEFT TOE PHALANX, OPEN APPROACH: ICD-10-PCS | Performed by: PODIATRIST

## 2018-03-07 PROCEDURE — 25010000002 PROPOFOL 10 MG/ML EMULSION: Performed by: NURSE ANESTHETIST, CERTIFIED REGISTERED

## 2018-03-07 PROCEDURE — 82962 GLUCOSE BLOOD TEST: CPT

## 2018-03-07 PROCEDURE — 85025 COMPLETE CBC W/AUTO DIFF WBC: CPT | Performed by: NURSE PRACTITIONER

## 2018-03-07 PROCEDURE — 87186 SC STD MICRODIL/AGAR DIL: CPT | Performed by: INTERNAL MEDICINE

## 2018-03-07 PROCEDURE — 80053 COMPREHEN METABOLIC PANEL: CPT | Performed by: NURSE PRACTITIONER

## 2018-03-07 RX ORDER — PROPOFOL 10 MG/ML
VIAL (ML) INTRAVENOUS AS NEEDED
Status: DISCONTINUED | OUTPATIENT
Start: 2018-03-07 | End: 2018-03-07 | Stop reason: SURG

## 2018-03-07 RX ORDER — MIDAZOLAM HYDROCHLORIDE 1 MG/ML
INJECTION INTRAMUSCULAR; INTRAVENOUS AS NEEDED
Status: DISCONTINUED | OUTPATIENT
Start: 2018-03-07 | End: 2018-03-07 | Stop reason: SURG

## 2018-03-07 RX ORDER — SODIUM CHLORIDE, SODIUM GLUCONATE, SODIUM ACETATE, POTASSIUM CHLORIDE, AND MAGNESIUM CHLORIDE 526; 502; 368; 37; 30 MG/100ML; MG/100ML; MG/100ML; MG/100ML; MG/100ML
INJECTION, SOLUTION INTRAVENOUS CONTINUOUS PRN
Status: DISCONTINUED | OUTPATIENT
Start: 2018-03-07 | End: 2018-03-07 | Stop reason: SURG

## 2018-03-07 RX ORDER — METRONIDAZOLE 500 MG/1
500 TABLET ORAL 3 TIMES DAILY
Status: DISCONTINUED | OUTPATIENT
Start: 2018-03-07 | End: 2018-03-07

## 2018-03-07 RX ORDER — FENTANYL CITRATE 50 UG/ML
INJECTION, SOLUTION INTRAMUSCULAR; INTRAVENOUS AS NEEDED
Status: DISCONTINUED | OUTPATIENT
Start: 2018-03-07 | End: 2018-03-07 | Stop reason: SURG

## 2018-03-07 RX ADMIN — METRONIDAZOLE 500 MG: 500 INJECTION, SOLUTION INTRAVENOUS at 00:22

## 2018-03-07 RX ADMIN — SODIUM CHLORIDE 1 G: 9 INJECTION INTRAMUSCULAR; INTRAVENOUS; SUBCUTANEOUS at 08:24

## 2018-03-07 RX ADMIN — VANCOMYCIN HYDROCHLORIDE 2000 MG: 5 INJECTION, POWDER, LYOPHILIZED, FOR SOLUTION INTRAVENOUS at 04:20

## 2018-03-07 RX ADMIN — METRONIDAZOLE 500 MG: 500 INJECTION, SOLUTION INTRAVENOUS at 09:15

## 2018-03-07 RX ADMIN — METOPROLOL TARTRATE 12.5 MG: 25 TABLET, FILM COATED ORAL at 21:42

## 2018-03-07 RX ADMIN — SODIUM CHLORIDE 1 G: 9 INJECTION INTRAMUSCULAR; INTRAVENOUS; SUBCUTANEOUS at 00:16

## 2018-03-07 RX ADMIN — DRONEDARONE 400 MG: 400 TABLET, FILM COATED ORAL at 21:42

## 2018-03-07 RX ADMIN — VANCOMYCIN HYDROCHLORIDE 2000 MG: 5 INJECTION, POWDER, LYOPHILIZED, FOR SOLUTION INTRAVENOUS at 16:47

## 2018-03-07 RX ADMIN — VALSARTAN 40 MG: 40 TABLET, FILM COATED ORAL at 21:42

## 2018-03-07 RX ADMIN — MIDAZOLAM 2 MG: 1 INJECTION INTRAMUSCULAR; INTRAVENOUS at 12:27

## 2018-03-07 RX ADMIN — SODIUM CHLORIDE, SODIUM GLUCONATE, SODIUM ACETATE, POTASSIUM CHLORIDE, AND MAGNESIUM CHLORIDE: 526; 502; 368; 37; 30 INJECTION, SOLUTION INTRAVENOUS at 12:39

## 2018-03-07 RX ADMIN — PROPOFOL 80 MG: 10 INJECTION, EMULSION INTRAVENOUS at 12:35

## 2018-03-07 RX ADMIN — FENTANYL CITRATE 25 MCG: 50 INJECTION, SOLUTION INTRAMUSCULAR; INTRAVENOUS at 12:56

## 2018-03-07 RX ADMIN — PROPOFOL 30 MG: 10 INJECTION, EMULSION INTRAVENOUS at 12:47

## 2018-03-07 RX ADMIN — PROPOFOL 30 MG: 10 INJECTION, EMULSION INTRAVENOUS at 12:40

## 2018-03-07 RX ADMIN — PROPOFOL 30 MG: 10 INJECTION, EMULSION INTRAVENOUS at 12:37

## 2018-03-07 RX ADMIN — PROPOFOL 30 MG: 10 INJECTION, EMULSION INTRAVENOUS at 12:43

## 2018-03-07 RX ADMIN — MAGNESIUM OXIDE TAB 400 MG (241.3 MG ELEMENTAL MG) 400 MG: 400 (241.3 MG) TAB at 21:42

## 2018-03-07 RX ADMIN — VITAMIN E CAP 100 UNIT 200 UNITS: 100 CAP at 21:42

## 2018-03-07 RX ADMIN — OXYCODONE HYDROCHLORIDE AND ACETAMINOPHEN 500 MG: 500 TABLET ORAL at 21:42

## 2018-03-07 RX ADMIN — FENTANYL CITRATE 50 MCG: 50 INJECTION, SOLUTION INTRAMUSCULAR; INTRAVENOUS at 12:37

## 2018-03-07 NOTE — BRIEF OP NOTE
FOOT IRRIGATION, DEBRIDEMENT AND REPAIR  Progress Note    Emre Lisa  3/5/2018 - 3/7/2018    Pre-op Diagnosis:   Cellulitis of left foot [L03.116]  Infected hardware in left lower extremity, subsequent encounter [T84.7XXD]       Post-Op Diagnosis Codes:     * Cellulitis of left foot [L03.116]     * Infected hardware in left lower extremity, subsequent encounter [T84.7XXD]    Procedure/CPT® Codes:      Procedure(s):  FOOT IRRIGATION, DEBRIDEMENT     Surgeon(s):  Marco A Thompson DPM    Anesthesia: Monitor Anesthesia Care    Staff:   Circulator: Dev Terry RN  Scrub Person: Yaa Keith  Assistant: Megan Pate    Estimated Blood Loss: minimal    Urine Voided: * No values recorded between 3/7/2018 12:29 PM and 3/7/2018  1:01 PM *    Specimens:                  ID Type Source Tests Collected by Time Destination   1 : BONE LEFT TALUS Wound Toe, Left WOUND CULTURE Marco A Thompson DPM 3/7/2018 1246    2 : LEFT GREAT TOE Wound Toe, Left WOUND CULTURE Marco A Thompson DPM 3/7/2018 1247          Drains:           Findings: consistent with pre-op dx    Complications: none      Marco A Thompson DPM     Date: 3/7/2018  Time: 1:03 PM

## 2018-03-07 NOTE — PROGRESS NOTES
LOS: 1 day   Patient Care Team:  Jamaal Bravo MD as PCP - General  Marco A Thompson DPM as Consulting Physician (Podiatry)    Chief Complaint: left foot infection    Subjective   Patient seen at bedside resting comfortably.  No acute distress.  Denies pain.    History of Present Illness    Subjective    History taken from: patient    Objective     Vital Signs  Temp:  [96.7 °F (35.9 °C)-99.6 °F (37.6 °C)] 99.6 °F (37.6 °C)  Heart Rate:  [70-80] 73  Resp:  [16-18] 18  BP: (112-143)/(63-83) 141/83    Objective     Constitutional: well developed, well nourished    HEENT: Normocephalic and atraumatic, normal hearing    Respiratory: Non labored respirations noted    LLE: Dressing is clean, dry and intact.  Upon removal there is decreased erythema noted.  Decreased edema noted.  Well approximated incisions noted to the dorsal fourth, third and second digits.  Hallux is packed open with 2 retention sutures.  No purulent drainage noted.    Psychiatric: A&O x 3 with normal mood and affect. NAD.     Results Review:     I reviewed the patient's new clinical results.      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 Risks and benefits discussed in detail. No guarantees given or implied.  NPO after midnight.   Please do not hesitate to call with questions.     Marco A Thompson DPM  03/06/18  6:22 PM

## 2018-03-07 NOTE — NURSING NOTE
[facility] Hospitalist after speaking to telemetry abut patients telemetry strip this am. Awaiting call back from MD. Gave report to EDY Leong. Spoke to her about following up with MD. LILLI Arceo made aware.

## 2018-03-07 NOTE — PROGRESS NOTES
"Pharmacokinetics by Pharmacy - Vancomycin    Emre Lisa is a 57 y.o. male  [Ht: 193 cm (75.98\"); Wt: 131 kg (288 lb)]    Estimated Creatinine Clearance: 134.9 mL/min (by C-G formula based on Cr of 0.91).   Lab Results   Component Value Date    CREATININE 0.91 03/07/2018    CREATININE 0.86 03/06/2018    CREATININE 0.75 03/05/2018      Lab Results   Component Value Date    WBC 4.68 03/07/2018    WBC 4.66 03/06/2018    WBC 4.88 03/05/2018      Temp Readings from Last 1 Encounters:   03/07/18 97.6 °F (36.4 °C)      Lab Results   Component Value Date    [facility] 12.24 03/07/2018         Culture Results:  Microbiology Results (last 10 days)       Procedure Component Value - Date/Time      Hardware / Foreign Body Culture - Hardware / Foreign Body, Foot, Left [[id number]]  (Normal) Collected:  03/05/18 2127    Lab Status:  Preliminary result Specimen:  Hardware / Foreign Body from Foot, Left Updated:  03/07/18 0802     Hardware / Foreign Body Culture Culture in progress     Gram Stain Result Rare (1+) WBCs seen      No No organisms seen    Tissue / Bone Culture - Bone, Foot, Left [[id number]]  (Abnormal) Collected:  03/05/18 1804    Lab Status:  Preliminary result Specimen:  Bone from Foot, Left Updated:  03/07/18 0839     Tissue Culture --      Moderate growth (3+) Staphylococcus, coagulase negative (A)     Gram Stain Result Few (2+) WBCs seen      Few (2+) Gram positive cocci in pairs    Narrative:       no anaerobes isolated    Anaerobic Culture - Wound, Foot, Left [[id number]]  (Normal) Collected:  03/05/18 1751    Lab Status:  Final result Specimen:  Wound from Foot, Left Updated:  03/07/18 0838     Culture No anaerobes isolated     Gram Stain Result Rare (1+) WBCs seen      No organisms seen    Wound Culture - Wound, Foot, Left [[id number]]  (Abnormal) Collected:  03/05/18 1751    Lab Status:  Preliminary result Specimen:  Wound from Foot, Left Updated:  03/07/18 0700     Wound Culture --      Moderate growth " (3+) Staphylococcus species (A)     Gram Stain Result Rare (1+) Gram positive cocci in pairs      Few (2+) WBCs seen               Indication for use: Cellulitis    Current Vancomycin Dose:  2000 mg IVPB every 12 hours, day 3 of therapy.      Assessment/Plan:  Trough level came back at goal (10-15).  Renal function stable (SCr stable / I/O stable).  WBC are WNL and patient afebrile.  2 cultures from wound are growing coag negative staph.  Recommend to deescalate to only vancomycin as this will cover the coag negative staph.  If any hardware was retained recommend to add rifampin to vancomycin for potential biofilm.  Sensitivities currently unavailable. Pharmacy will continue to monitor renal function and adjust dose accordingly.    Sandor Meléndez III, [facility]   03/07/18 8:43 AM

## 2018-03-07 NOTE — INTERVAL H&P NOTE
H&P reviewed. The patient was examined and there are no changes to the H&P.             This document has been electronically signed by Marco A Thompson DPM on March 7, 2018 12:19 PM

## 2018-03-07 NOTE — PROGRESS NOTES
[facility] Medicine Services  INPATIENT PROGRESS NOTE    Length of Stay: 2  Date of Admission: 3/5/2018  Primary Care Physician: Jamaal Bravo MD    Subjective   Chief Complaint: left foot infection  HPI:  57 year old  male with past medical history of atrial fibrillation, type 2 DM, HTN, arthritis that presented as a direct admission from podiatry on 3/5/18 related to need for I & D and hardware removal in his left foot.  He underwent hallux IPJ fusion and hammertoe correction of digits 2 through 4 on 2/15/18 with Dr. Thompson and developed increasing pain to the site with drainage noted.  He was treated in outpatient setting with oral Omnicef by his PCP, but was admitted as he had no improvement.  He is awaiting return to OR today for repeat washout of wound.  He has no complaints today.    Review of Systems   Constitutional: Negative for chills and fever.   Respiratory: Negative for cough, shortness of breath and wheezing.    Cardiovascular: Negative for chest pain, palpitations and leg swelling.   Gastrointestinal: Negative for abdominal pain, constipation, diarrhea, nausea and vomiting.   Musculoskeletal: Positive for back pain.   Psychiatric/Behavioral: Negative for confusion.        All pertinent negatives and positives are as above. All other systems have been reviewed and are negative unless otherwise stated.     Objective    Temp:  [96.5 °F (35.8 °C)-99.6 °F (37.6 °C)] 97.6 °F (36.4 °C)  Heart Rate:  [] 66  Resp:  [18] 18  BP: (118-141)/(68-83) 118/68    Physical Exam   Constitutional: He is oriented to person, place, and time. He appears well-developed and well-nourished.   HENT:   Head: Normocephalic.   Eyes: Conjunctivae are normal.   Neck: Neck supple.   Cardiovascular: Normal rate, regular rhythm, normal heart sounds and intact distal pulses.    Pulmonary/Chest: Effort normal and breath sounds normal.   Abdominal: Soft. Bowel sounds are  normal.   Musculoskeletal: Normal range of motion. He exhibits no edema.   Neurological: He is alert and oriented to person, place, and time.   Skin: Skin is dry.   Dressing to left foot is clean, dry and intact.  Sutures to second, third, and fourth digits are intact.     Vitals reviewed.      Results Review:  I have reviewed the labs, radiology results, and diagnostic studies.    Laboratory Data:     Results from last 7 days  Lab Units 03/07/18  0328 03/06/18  0727 03/05/18  1519   SODIUM mmol/L 143 139 138   POTASSIUM mmol/L 4.3 4.4 4.4   CHLORIDE mmol/L 104 102 100   CO2 mmol/L 25.0 28.0 25.0   BUN mg/dL 19 19 23*   CREATININE mg/dL 0.91 0.86 0.75   GLUCOSE mg/dL 97 82 77   CALCIUM mg/dL 8.8 8.5 8.4   BILIRUBIN mg/dL 0.4 0.6 0.5   ALK PHOS U/L 54 53 55   ALT (SGPT) U/L 35 30 39   AST (SGOT) U/L 37 27 21   ANION GAP mmol/L 14.0 9.0 13.0     Estimated Creatinine Clearance: 134.9 mL/min (by C-G formula based on Cr of 0.91).            Results from last 7 days  Lab Units 03/07/18 0328 03/06/18  0727 03/05/18  1519   WBC 10*3/mm3 4.68 4.66 4.88   HEMOGLOBIN g/dL 13.2* 12.6* 12.4*   HEMATOCRIT % 39.5 37.8* 36.8*   PLATELETS 10*3/mm3 156 152 146*           Culture Data:   No results found for: BLOODCX  No results found for: URINECX  No results found for: RESPCX  Wound Culture   Date Value Ref Range Status   03/05/2018 Moderate growth (3+) Staphylococcus species (A)  Preliminary     No results found for: STOOLCX  No components found for: BODYFLD    Radiology Data:   Imaging Results (last 24 hours)     ** No results found for the last 24 hours. **          I have reviewed the patient current medications.     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       Plan:    1. Left foot cellulitis: Podiatry following. Plan for return to OR today for wound wash out.  Continue Vancomycin but discontinue Flagyl and Aztreonam as culture only shows gram positive growth.   2. Type 2 DM: Amaryl, FSBS AC and HS with SSI.  3. History of atrial fibrillation: Multaq  4. HTN: Diovan, Metoprolol            This document has been electronically signed by WALTER Dodson on March 7, 2018 10:19 AM

## 2018-03-07 NOTE — PLAN OF CARE
Problem: Patient Care Overview (Adult)  Goal: Plan of Care Review  Outcome: Ongoing (interventions implemented as appropriate)   03/07/18 0415   Coping/Psychosocial Response Interventions   Plan Of Care Reviewed With patient   Patient Care Overview   Progress no change   Outcome Evaluation   Outcome Summary/Follow up Plan Patinet ready for procedure today. Consent signed this shift. Patient has had a hard time trying to sleep but said he was able to rest a little. No distress noted.        Problem: Pain, Acute (Adult)  Goal: Acceptable Pain Control/Comfort Level  Outcome: Ongoing (interventions implemented as appropriate)      Problem: Perioperative Period (Adult)  Goal: Signs and Symptoms of Listed Potential Problems Will be Absent or Manageable (Perioperative Period)  Outcome: Ongoing (interventions implemented as appropriate)

## 2018-03-08 LAB
ALBUMIN SERPL-MCNC: 3.8 G/DL (ref 3.4–4.8)
ALBUMIN/GLOB SERPL: 1 G/DL (ref 1.1–1.8)
ALP SERPL-CCNC: 57 U/L (ref 38–126)
ALT SERPL W P-5'-P-CCNC: 47 U/L (ref 21–72)
ANION GAP SERPL CALCULATED.3IONS-SCNC: 14 MMOL/L (ref 5–15)
AST SERPL-CCNC: 50 U/L (ref 17–59)
BACTERIA SPEC AEROBE CULT: ABNORMAL
BASOPHILS # BLD AUTO: 0.03 10*3/MM3 (ref 0–0.2)
BASOPHILS NFR BLD AUTO: 0.6 % (ref 0–2)
BILIRUB SERPL-MCNC: 0.4 MG/DL (ref 0.2–1.3)
BUN BLD-MCNC: 22 MG/DL (ref 7–21)
BUN/CREAT SERPL: 24.2 (ref 7–25)
CALCIUM SPEC-SCNC: 8.8 MG/DL (ref 8.4–10.2)
CHLORIDE SERPL-SCNC: 101 MMOL/L (ref 95–110)
CO2 SERPL-SCNC: 27 MMOL/L (ref 22–31)
CREAT BLD-MCNC: 0.91 MG/DL (ref 0.7–1.3)
DEPRECATED RDW RBC AUTO: 40.3 FL (ref 35.1–43.9)
EOSINOPHIL # BLD AUTO: 0.17 10*3/MM3 (ref 0–0.7)
EOSINOPHIL NFR BLD AUTO: 3.2 % (ref 0–7)
ERYTHROCYTE [DISTWIDTH] IN BLOOD BY AUTOMATED COUNT: 12.5 % (ref 11.5–14.5)
GFR SERPL CREATININE-BSD FRML MDRD: 86 ML/MIN/1.73 (ref 56–130)
GLOBULIN UR ELPH-MCNC: 3.7 GM/DL (ref 2.3–3.5)
GLUCOSE BLD-MCNC: 92 MG/DL (ref 60–100)
GLUCOSE BLDC GLUCOMTR-MCNC: 112 MG/DL (ref 70–130)
GLUCOSE BLDC GLUCOMTR-MCNC: 66 MG/DL (ref 70–130)
GLUCOSE BLDC GLUCOMTR-MCNC: 87 MG/DL (ref 70–130)
GLUCOSE BLDC GLUCOMTR-MCNC: 88 MG/DL (ref 70–130)
GRAM STN SPEC: ABNORMAL
HCT VFR BLD AUTO: 40.8 % (ref 39–49)
HGB BLD-MCNC: 13.7 G/DL (ref 13.7–17.3)
IMM GRANULOCYTES # BLD: 0.01 10*3/MM3 (ref 0–0.02)
IMM GRANULOCYTES NFR BLD: 0.2 % (ref 0–0.5)
LYMPHOCYTES # BLD AUTO: 1.59 10*3/MM3 (ref 0.6–4.2)
LYMPHOCYTES NFR BLD AUTO: 30.1 % (ref 10–50)
MCH RBC QN AUTO: 29.6 PG (ref 26.5–34)
MCHC RBC AUTO-ENTMCNC: 33.6 G/DL (ref 31.5–36.3)
MCV RBC AUTO: 88.1 FL (ref 80–98)
MONOCYTES # BLD AUTO: 0.57 10*3/MM3 (ref 0–0.9)
MONOCYTES NFR BLD AUTO: 10.8 % (ref 0–12)
NEUTROPHILS # BLD AUTO: 2.91 10*3/MM3 (ref 2–8.6)
NEUTROPHILS NFR BLD AUTO: 55.1 % (ref 37–80)
PLATELET # BLD AUTO: 179 10*3/MM3 (ref 150–450)
PMV BLD AUTO: 10.1 FL (ref 8–12)
POTASSIUM BLD-SCNC: 4.4 MMOL/L (ref 3.5–5.1)
PROT SERPL-MCNC: 7.5 G/DL (ref 6.3–8.6)
RBC # BLD AUTO: 4.63 10*6/MM3 (ref 4.37–5.74)
SODIUM BLD-SCNC: 142 MMOL/L (ref 137–145)
WBC NRBC COR # BLD: 5.28 10*3/MM3 (ref 3.2–9.8)

## 2018-03-08 PROCEDURE — 85025 COMPLETE CBC W/AUTO DIFF WBC: CPT | Performed by: NURSE PRACTITIONER

## 2018-03-08 PROCEDURE — 25010000002 VANCOMYCIN PER 500 MG: Performed by: NURSE PRACTITIONER

## 2018-03-08 PROCEDURE — 80053 COMPREHEN METABOLIC PANEL: CPT | Performed by: NURSE PRACTITIONER

## 2018-03-08 PROCEDURE — 82962 GLUCOSE BLOOD TEST: CPT

## 2018-03-08 PROCEDURE — 99024 POSTOP FOLLOW-UP VISIT: CPT | Performed by: PODIATRIST

## 2018-03-08 RX ADMIN — MAGNESIUM OXIDE TAB 400 MG (241.3 MG ELEMENTAL MG) 400 MG: 400 (241.3 MG) TAB at 21:36

## 2018-03-08 RX ADMIN — VANCOMYCIN HYDROCHLORIDE 2000 MG: 5 INJECTION, POWDER, LYOPHILIZED, FOR SOLUTION INTRAVENOUS at 03:19

## 2018-03-08 RX ADMIN — VITAMIN E CAP 100 UNIT 200 UNITS: 100 CAP at 21:36

## 2018-03-08 RX ADMIN — DRONEDARONE 400 MG: 400 TABLET, FILM COATED ORAL at 21:36

## 2018-03-08 RX ADMIN — SODIUM CHLORIDE 100 ML/HR: 900 INJECTION, SOLUTION INTRAVENOUS at 14:24

## 2018-03-08 RX ADMIN — VANCOMYCIN HYDROCHLORIDE 2000 MG: 5 INJECTION, POWDER, LYOPHILIZED, FOR SOLUTION INTRAVENOUS at 15:36

## 2018-03-08 RX ADMIN — SODIUM CHLORIDE 100 ML/HR: 900 INJECTION, SOLUTION INTRAVENOUS at 03:14

## 2018-03-08 RX ADMIN — MAGNESIUM OXIDE TAB 400 MG (241.3 MG ELEMENTAL MG) 400 MG: 400 (241.3 MG) TAB at 08:13

## 2018-03-08 RX ADMIN — GLIPIZIDE 5 MG: 5 TABLET ORAL at 08:13

## 2018-03-08 RX ADMIN — OXYCODONE HYDROCHLORIDE AND ACETAMINOPHEN 500 MG: 500 TABLET ORAL at 21:35

## 2018-03-08 RX ADMIN — VALSARTAN 40 MG: 40 TABLET, FILM COATED ORAL at 21:36

## 2018-03-08 RX ADMIN — METOPROLOL TARTRATE 12.5 MG: 25 TABLET, FILM COATED ORAL at 21:35

## 2018-03-08 NOTE — PROGRESS NOTES
"Pharmacokinetics by Pharmacy - Vancomycin    Emre Lisa is a 57 y.o. male  [Ht: 193 cm (75.98\"); Wt: 131 kg (288 lb)]    Estimated Creatinine Clearance: 134.9 mL/min (by C-G formula based on Cr of 0.91).   Lab Results   Component Value Date    CREATININE 0.91 03/08/2018    CREATININE 0.91 03/07/2018    CREATININE 0.86 03/06/2018      Lab Results   Component Value Date    WBC 5.28 03/08/2018    WBC 4.68 03/07/2018    WBC 4.66 03/06/2018      Temp Readings from Last 1 Encounters:   03/08/18 97.3 °F (36.3 °C) (Axillary)      Lab Results   Component Value Date    [facility]         Culture Results:  Microbiology Results (last 10 days)       Procedure Component Value - Date/Time      Tissue / Bone Culture - Tissue, Toe, Left [[id number]]  (Normal) Collected:  03/07/18 1313    Lab Status:  Preliminary result Specimen:  Tissue from Toe, Left Updated:  03/08/18 0728     Tissue Culture No growth at less than 24 hours     Gram Stain Result Rare (1+) WBCs seen      No organisms seen    Tissue / Bone Culture - Tissue, Toe, Left [[id number]]  (Normal) Collected:  03/07/18 1313    Lab Status:  Preliminary result Specimen:  Tissue from Toe, Left Updated:  03/08/18 0736     Tissue Culture Culture in progress     Gram Stain Result Rare (1+) WBCs seen      No organisms seen    Narrative:       Slide reviewed confirmed    Hardware / Foreign Body Culture - Hardware / Foreign Body, Foot, Left [[id number]]  (Abnormal) Collected:  03/05/18 2127    Lab Status:  Final result Specimen:  Hardware / Foreign Body from Foot, Left Updated:  03/08/18 0716     Hardware / Foreign Body Culture Culture in progress      Light growth (2+) Staphylococcus, coagulase negative (A)      Susceptibility not indicated          Gram Stain Result Rare (1+) WBCs seen      No No organisms seen    Tissue / Bone Culture - Bone, Foot, Left [[id number]]  (Abnormal) Collected:  03/05/18 1804    Lab Status:  Preliminary result Specimen:  Bone from " Foot, Left Updated:  03/07/18 0839     Tissue Culture --      Moderate growth (3+) Staphylococcus, coagulase negative (A)     Gram Stain Result Few (2+) WBCs seen      Few (2+) Gram positive cocci in pairs    Narrative:       no anaerobes isolated    Anaerobic Culture - Wound, Foot, Left [[id number]]  (Normal) Collected:  03/05/18 1751    Lab Status:  Final result Specimen:  Wound from Foot, Left Updated:  03/07/18 0838     Culture No anaerobes isolated     Gram Stain Result Rare (1+) WBCs seen      No organisms seen    Wound Culture - Wound, Foot, Left [[id number]]  (Abnormal) Collected:  03/05/18 1751    Lab Status:  Final result Specimen:  Wound from Foot, Left Updated:  03/08/18 0710     Wound Culture --      Moderate growth (3+) Staphylococcus, coagulase negative (A)      Susceptibility not indicated          Gram Stain Result Rare (1+) Gram positive cocci in pairs      Few (2+) WBCs seen               Indication for use: cellulitis    Current Vancomycin Dose:  2000 mg IVPB every 12 hours, day 4 of therapy.      Assessment/Plan:  Reviewed above labs and cultures. WBC are normal and Cr is stable. Infected Hardware was removed from foot. Dose is appropriate. Pharmacy will continue to monitor renal function and adjust dose accordingly.    Sowmya Verduzco SONG   03/08/18 9:35 AM

## 2018-03-08 NOTE — PROGRESS NOTES
[facility] Medicine Services  INPATIENT PROGRESS NOTE    Length of Stay: 3  Date of Admission: 3/5/2018  Primary Care Physician: Jamaal Bravo MD    Subjective   Chief Complaint/HPI:  This 57 year old male was admitted to hospitalist services secondary to left foot cellulitis.  Patient has also been evaluated and treated by Dr. Thompson of podiatry.  Patient has undergone irrigation and debridement of the left foot.  Patient currently growing coagulase-negative staph.  Though this strain is predictably sensitive to penicillin and clindamycin, per medical literature vancomycin is the preferred drug of choice for osteomyelitis for this group.  All other antibiotics have been de-escalated.  Patient has no pain.  He is scheduled to return to the operating suite within 48 hours for another washout and further treatment.    Review of Systems   Constitutional: Negative for chills and fever.   Respiratory: Negative for shortness of breath.    Cardiovascular: Negative for chest pain.   Gastrointestinal: Negative for abdominal pain, nausea and vomiting.      All pertinent negatives and positives are as above. All other systems have been reviewed and are negative unless otherwise stated.     Objective    Temp:  [97.2 °F (36.2 °C)-97.8 °F (36.6 °C)] 97.4 °F (36.3 °C)  Heart Rate:  [65-89] 78  Resp:  [18] 18  BP: (108-124)/(64-77) 123/65    Physical Exam   Constitutional: He is oriented to person, place, and time. He appears well-developed and well-nourished. No distress.   HENT:   Head: Normocephalic and atraumatic.   Cardiovascular: Normal rate and regular rhythm.    Pulmonary/Chest: Effort normal and breath sounds normal. No respiratory distress. He has no wheezes.   Abdominal: Soft. Bowel sounds are normal. He exhibits no distension. There is no tenderness.   Neurological: He is alert and oriented to person, place, and time.   Skin: Skin is warm and dry. He is not diaphoretic.    Psychiatric: He has a normal mood and affect. His behavior is normal.     Results Review:  I have reviewed the labs, radiology results, and diagnostic studies.    Laboratory Data:     Results from last 7 days  Lab Units 03/08/18  0505 03/07/18 0328 03/06/18  0727   SODIUM mmol/L 142 143 139   POTASSIUM mmol/L 4.4 4.3 4.4   CHLORIDE mmol/L 101 104 102   CO2 mmol/L 27.0 25.0 28.0   BUN mg/dL 22* 19 19   CREATININE mg/dL 0.91 0.91 0.86   GLUCOSE mg/dL 92 97 82   CALCIUM mg/dL 8.8 8.8 8.5   BILIRUBIN mg/dL 0.4 0.4 0.6   ALK PHOS U/L 57 54 53   ALT (SGPT) U/L 47 35 30   AST (SGOT) U/L 50 37 27   ANION GAP mmol/L 14.0 14.0 9.0     Estimated Creatinine Clearance: 134.9 mL/min (by C-G formula based on Cr of 0.91).            Results from last 7 days  Lab Units 03/08/18  0505 03/07/18 0328 03/06/18 0727 03/05/18  1519   WBC 10*3/mm3 5.28 4.68 4.66 4.88   HEMOGLOBIN g/dL 13.7 13.2* 12.6* 12.4*   HEMATOCRIT % 40.8 39.5 37.8* 36.8*   PLATELETS 10*3/mm3 179 156 152 146*           Culture Data:   No results found for: BLOODCX  No results found for: URINECX  No results found for: RESPCX  Wound Culture   Date Value Ref Range Status   03/05/2018 (A)  Final    Moderate growth (3+) Staphylococcus, coagulase negative     Comment:     Susceptibility not indicated       No results found for: STOOLCX  No components found for: BODYFLD    Radiology Data:   Imaging Results (last 24 hours)     ** No results found for the last 24 hours. **          I have reviewed the patient current medications.     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                This document has been electronically signed by BRYAN Ocampo on March 8, 2018 2:49 PM

## 2018-03-08 NOTE — OP NOTE
Date of Procedure: 03/07/18     SURGEON: Marco A Thompson DPM      ASSISTANT: LLOYD Richardson      PREOPERATIVE DIAGNOSIS:   1.  Cellulitis left foot  2.  Osteomyelitis left foot     POSTOPERATIVE DIAGNOSIS: Same     PROCEDURES PERFORMED: Irrigation and debridement left foot     ANESTHESIA: IV sedation     HEMOSTASIS: Direct pressure      ESTIMATED BLOOD LOSS: 25 mL.      MATERIALS: Quarter-inch iodoform packing gauze     INJECTABLES: None     COMPLICATIONS: None.      CONDITION: Stable.     PATHOLOGY: Bone and tissue sent to microbiology for Gram stain and culture     INDICATIONS FOR PROCEDURE: This is a patient of mine who is currently admitted to the hospital with osteomyelitis secondary to hardware infection.  He agrees to undergo the surgical intervention at this time. Consent is signed and documented in the chart. History and physical exam were reviewed prior to patient being taken to the operating room and n.p.o. status was confirmed. No guarantees were given or implied regarding the outcome of this treatment.      DESCRIPTION OF PROCEDURE: After mild sedation was administered by the anesthesia team in the preoperative holding area the patient was transported to the operating room and placed in a supine position.  IV sedation was then administered and the left foot was prepped and draped in usual sterile technique and a timeout was performed to confirm the correct patient, correct site, and correct procedure.      Attention was then directed to the left foot there is noted to be a packed open left hallux.  Retention sutures are removed and iodoform packing gauze was removed.  There is noted to be a large open wound measuring approximately 4 cm x 3 cm x 2cm. no purulent drainage noted at this time.  Slight greenish color noted to the distal tip of the hallux.  Improved cellulitis and edema noted.  At this time a debridement was performed with a Rogeur of all nonviable tissue.  Post-debridement  measurements of the wound are 4 cm x 3 cm x 2 cm.  The site was flushed with copious amounts of antibiotic irrigation.  It was impacted with iodoform packing gauze and two 3-0 nylon retention sutures used the whole packing in place.  Betadine soaked Adaptic followed by dry sterile dressing was applied.  The patient tolerated the procedure well and was transferred from the operating room to the PACU with vital signs stable and neurovascular status intact to the operative extremity.    Plan is to discharge patient back to the floor once stable per anesthesia.  He can resume his normal diet.  He can be partial weightbearing to the left heel.  He is given dressing clean, dry and intact.  IV antibiotics per primary care team.  Plans for repeat trip to the operating room for irrigation and delayed primary closure in the next couple of days.             This document has been electronically signed by Marco A Thompson DPM on March 7, 2018 6:41 PM

## 2018-03-08 NOTE — OP NOTE
Date of Procedure: 03/05/18     SURGEON: Marco A Thompson DPM      ASSISTANT: LLOYD Richardson      PREOPERATIVE DIAGNOSIS:   1.  Cellulitis left foot  2.  Infected hardware left foot   3.  Osteomyelitis left foot    POSTOPERATIVE DIAGNOSIS: Same     PROCEDURES PERFORMED:   1.  Incision and drainage left foot  2.  Hardware removal left foot  3.  Bone biopsy left great toe    ANESTHESIA: Mac with a local block     HEMOSTASIS: Direct pressure      ESTIMATED BLOOD LOSS: 25 mL.      MATERIALS: Quarter-inch iodoform packing gauze     INJECTABLES: 20 cc of half percent Marcaine plain     COMPLICATIONS: None.      CONDITION: Stable.     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  He had hallux interphalangeal joint arthrodesis proximal 3 weeks ago with subsequent infection.  He agrees to undergo the surgical intervention at this time. Consent is signed and documented in the chart. History and physical exam were reviewed prior to patient being taken to the operating room and n.p.o. status was confirmed. No guarantees were given or implied regarding the outcome of this treatment.      DESCRIPTION OF PROCEDURE: After mild sedation was administered by the anesthesia team in the preoperative holding area the patient was transported to the operating room and placed in a supine position.  IV sedation was then administered and the left foot was prepped and draped in usual sterile technique and a timeout was performed to confirm the correct patient, correct site, and correct procedure.      Attention was then directed to the left foot where there is noted to be cellulitis streaking from the left great toe of the midfoot.  The great toe was extremely edematous and erythematous.  There is drainage noted coming from the incision site.  At this time sutures and  the incision site were removed.  The incision site was reopened and a small pocket of infection was expressed.  This infection was cultured and will be sent to [facility], operated by [facility] for analysis.  Next the 5-0 cannulated screw was removed and was sent to pathology.  Next the incision site was flushed with copious amount of antibiotic irrigation.  A bone biopsy was then obtained with lory Peralta and will be sent to microbiology for analysis.  Two 3-0 nylon retention sutures were placed in the incision site was packed with iodoform packing gauze.  A sterile dressing was applied.  The patient tolerated procedure and anesthesia well and was transported from the operating room to the PACU with vital signs stable and neurovascular status intact to the operative extremity.    The plan is to discharge patient back to the floor once stable per anesthesia.  He can resume his normal diet.  Continue antibiotics per primary care team.  We'll follow up wound culture and bone biopsy to tailor her antibiotic therapy.  Plan for repeat irrigation and debridement in the next couple of days.             This document has been electronically signed by Marco A Thompson DPM on March 7, 2018 6:33 PM

## 2018-03-09 ENCOUNTER — ANESTHESIA EVENT (OUTPATIENT)
Dept: PERIOP | Facility: HOSPITAL | Age: 58
End: 2018-03-09

## 2018-03-09 LAB
ALBUMIN SERPL-MCNC: 3.8 G/DL (ref 3.4–4.8)
ALBUMIN/GLOB SERPL: 1.1 G/DL (ref 1.1–1.8)
ALP SERPL-CCNC: 56 U/L (ref 38–126)
ALT SERPL W P-5'-P-CCNC: 104 U/L (ref 21–72)
ANION GAP SERPL CALCULATED.3IONS-SCNC: 11 MMOL/L (ref 5–15)
AST SERPL-CCNC: 101 U/L (ref 17–59)
BACTERIA SPEC AEROBE CULT: ABNORMAL
BACTERIA SPEC AEROBE CULT: ABNORMAL
BASOPHILS # BLD AUTO: 0.03 10*3/MM3 (ref 0–0.2)
BASOPHILS NFR BLD AUTO: 0.6 % (ref 0–2)
BILIRUB SERPL-MCNC: 0.3 MG/DL (ref 0.2–1.3)
BUN BLD-MCNC: 21 MG/DL (ref 7–21)
BUN/CREAT SERPL: 24.4 (ref 7–25)
CALCIUM SPEC-SCNC: 9.1 MG/DL (ref 8.4–10.2)
CHLORIDE SERPL-SCNC: 101 MMOL/L (ref 95–110)
CO2 SERPL-SCNC: 27 MMOL/L (ref 22–31)
CREAT BLD-MCNC: 0.86 MG/DL (ref 0.7–1.3)
DEPRECATED RDW RBC AUTO: 41.1 FL (ref 35.1–43.9)
EOSINOPHIL # BLD AUTO: 0.14 10*3/MM3 (ref 0–0.7)
EOSINOPHIL NFR BLD AUTO: 2.8 % (ref 0–7)
ERYTHROCYTE [DISTWIDTH] IN BLOOD BY AUTOMATED COUNT: 12.8 % (ref 11.5–14.5)
GFR SERPL CREATININE-BSD FRML MDRD: 92 ML/MIN/1.73 (ref 60–130)
GLOBULIN UR ELPH-MCNC: 3.6 GM/DL (ref 2.3–3.5)
GLUCOSE BLD-MCNC: 114 MG/DL (ref 60–100)
GLUCOSE BLDC GLUCOMTR-MCNC: 107 MG/DL (ref 70–130)
GLUCOSE BLDC GLUCOMTR-MCNC: 108 MG/DL (ref 70–130)
GLUCOSE BLDC GLUCOMTR-MCNC: 140 MG/DL (ref 70–130)
GLUCOSE BLDC GLUCOMTR-MCNC: 84 MG/DL (ref 70–130)
GLUCOSE BLDC GLUCOMTR-MCNC: 84 MG/DL (ref 70–130)
GRAM STN SPEC: ABNORMAL
GRAM STN SPEC: ABNORMAL
HCT VFR BLD AUTO: 40.6 % (ref 39–49)
HGB BLD-MCNC: 13.7 G/DL (ref 13.7–17.3)
IMM GRANULOCYTES # BLD: 0.02 10*3/MM3 (ref 0–0.02)
IMM GRANULOCYTES NFR BLD: 0.4 % (ref 0–0.5)
LYMPHOCYTES # BLD AUTO: 1.38 10*3/MM3 (ref 0.6–4.2)
LYMPHOCYTES NFR BLD AUTO: 27.2 % (ref 10–50)
MCH RBC QN AUTO: 29.8 PG (ref 26.5–34)
MCHC RBC AUTO-ENTMCNC: 33.7 G/DL (ref 31.5–36.3)
MCV RBC AUTO: 88.5 FL (ref 80–98)
MONOCYTES # BLD AUTO: 0.54 10*3/MM3 (ref 0–0.9)
MONOCYTES NFR BLD AUTO: 10.6 % (ref 0–12)
NEUTROPHILS # BLD AUTO: 2.97 10*3/MM3 (ref 2–8.6)
NEUTROPHILS NFR BLD AUTO: 58.4 % (ref 37–80)
NRBC BLD MANUAL-RTO: 0 /100 WBC (ref 0–0)
PLATELET # BLD AUTO: 197 10*3/MM3 (ref 150–450)
PMV BLD AUTO: 9.5 FL (ref 8–12)
POTASSIUM BLD-SCNC: 4.6 MMOL/L (ref 3.5–5.1)
PROT SERPL-MCNC: 7.4 G/DL (ref 6.3–8.6)
RBC # BLD AUTO: 4.59 10*6/MM3 (ref 4.37–5.74)
SODIUM BLD-SCNC: 139 MMOL/L (ref 137–145)
WBC NRBC COR # BLD: 5.08 10*3/MM3 (ref 3.2–9.8)

## 2018-03-09 PROCEDURE — 82962 GLUCOSE BLOOD TEST: CPT

## 2018-03-09 PROCEDURE — 25010000002 VANCOMYCIN PER 500 MG: Performed by: PODIATRIST

## 2018-03-09 PROCEDURE — 85025 COMPLETE CBC W/AUTO DIFF WBC: CPT | Performed by: NURSE PRACTITIONER

## 2018-03-09 PROCEDURE — 25010000002 VANCOMYCIN PER 500 MG: Performed by: NURSE PRACTITIONER

## 2018-03-09 PROCEDURE — 80053 COMPREHEN METABOLIC PANEL: CPT | Performed by: NURSE PRACTITIONER

## 2018-03-09 RX ADMIN — GLIPIZIDE 5 MG: 5 TABLET ORAL at 08:15

## 2018-03-09 RX ADMIN — VALSARTAN 40 MG: 40 TABLET, FILM COATED ORAL at 20:23

## 2018-03-09 RX ADMIN — VITAMIN E CAP 100 UNIT 200 UNITS: 100 CAP at 20:23

## 2018-03-09 RX ADMIN — HYDROCORTISONE: 1 CREAM TOPICAL at 12:27

## 2018-03-09 RX ADMIN — DRONEDARONE 400 MG: 400 TABLET, FILM COATED ORAL at 20:38

## 2018-03-09 RX ADMIN — SODIUM CHLORIDE 100 ML/HR: 900 INJECTION, SOLUTION INTRAVENOUS at 15:35

## 2018-03-09 RX ADMIN — VANCOMYCIN HYDROCHLORIDE 1500 MG: 5 INJECTION, POWDER, LYOPHILIZED, FOR SOLUTION INTRAVENOUS at 12:28

## 2018-03-09 RX ADMIN — HYDROCORTISONE: 1 CREAM TOPICAL at 20:24

## 2018-03-09 RX ADMIN — VANCOMYCIN HYDROCHLORIDE 1500 MG: 5 INJECTION, POWDER, LYOPHILIZED, FOR SOLUTION INTRAVENOUS at 20:23

## 2018-03-09 RX ADMIN — OXYCODONE HYDROCHLORIDE AND ACETAMINOPHEN 500 MG: 500 TABLET ORAL at 20:23

## 2018-03-09 RX ADMIN — MAGNESIUM OXIDE TAB 400 MG (241.3 MG ELEMENTAL MG) 400 MG: 400 (241.3 MG) TAB at 08:15

## 2018-03-09 RX ADMIN — METOPROLOL TARTRATE 12.5 MG: 25 TABLET, FILM COATED ORAL at 20:23

## 2018-03-09 RX ADMIN — SODIUM CHLORIDE 100 ML/HR: 900 INJECTION, SOLUTION INTRAVENOUS at 01:41

## 2018-03-09 RX ADMIN — VANCOMYCIN HYDROCHLORIDE 2000 MG: 5 INJECTION, POWDER, LYOPHILIZED, FOR SOLUTION INTRAVENOUS at 03:46

## 2018-03-09 RX ADMIN — MAGNESIUM OXIDE TAB 400 MG (241.3 MG ELEMENTAL MG) 400 MG: 400 (241.3 MG) TAB at 20:23

## 2018-03-09 NOTE — PROGRESS NOTES
[facility] Medicine Services  INPATIENT PROGRESS NOTE    Length of Stay: 4  Date of Admission: 3/5/2018  Primary Care Physician: Jamaal Bravo MD    Subjective   Please note that all previous progress notes, medication changes, lab findings, Radiographical changes, and physical exam findings have been noted and updated as appropriate.    3/9/2018: Patient has no complaints.  Dr. Thompson has recommended magic butt cream for moisturization of right foot.  No fever or chills, no nausea or vomiting.  Will have a follow-up OR procedure tomorrow with Dr. Thompson.    Chief Complaint/HPI:  This 57 year old male was admitted to hospitalist services secondary to left foot cellulitis.  Patient has also been evaluated and treated by Dr. Thompson of podiatry.  Patient has undergone irrigation and debridement of the left foot.  Patient currently growing coagulase-negative staph.  Though this strain is predictably sensitive to penicillin and clindamycin, per medical literature vancomycin is the preferred drug of choice for osteomyelitis for this group.  All other antibiotics have been de-escalated.  Patient has no pain.  He is scheduled to return to the operating suite within 48 hours for another washout and further treatment.    Review of Systems   Constitutional: Negative for chills and fever.   Respiratory: Negative for shortness of breath.    Cardiovascular: Negative for chest pain.   Gastrointestinal: Negative for abdominal pain, nausea and vomiting.      All pertinent negatives and positives are as above. All other systems have been reviewed and are negative unless otherwise stated.     Objective    Temp:  [96.1 °F (35.6 °C)-98 °F (36.7 °C)] 96.7 °F (35.9 °C)  Heart Rate:  [62-79] 75  Resp:  [18] 18  BP: (112-123)/(57-79) 112/71    Physical Exam   Constitutional: He is oriented to person, place, and time. He appears well-developed and well-nourished. No distress.   HENT:   Head:  Normocephalic and atraumatic.   Cardiovascular: Normal rate and regular rhythm.    Pulmonary/Chest: Effort normal and breath sounds normal. No respiratory distress. He has no wheezes.   Abdominal: Soft. Bowel sounds are normal. He exhibits no distension. There is no tenderness.   Neurological: He is alert and oriented to person, place, and time.   Skin: Skin is warm and dry. He is not diaphoretic.   Psychiatric: He has a normal mood and affect. His behavior is normal.     Results Review:  I have reviewed the labs, radiology results, and diagnostic studies.    Laboratory Data:     Results from last 7 days  Lab Units 03/09/18  0818 03/08/18  0505 03/07/18  0328   SODIUM mmol/L 139 142 143   POTASSIUM mmol/L 4.6 4.4 4.3   CHLORIDE mmol/L 101 101 104   CO2 mmol/L 27.0 27.0 25.0   BUN mg/dL 21 22* 19   CREATININE mg/dL 0.86 0.91 0.91   GLUCOSE mg/dL 114* 92 97   CALCIUM mg/dL 9.1 8.8 8.8   BILIRUBIN mg/dL 0.3 0.4 0.4   ALK PHOS U/L 56 57 54   ALT (SGPT) U/L 104* 47 35   AST (SGOT) U/L 101* 50 37   ANION GAP mmol/L 11.0 14.0 14.0     Estimated Creatinine Clearance: 142.8 mL/min (by C-G formula based on Cr of 0.86).            Results from last 7 days  Lab Units 03/09/18  0818 03/08/18  0505 03/07/18  0328 03/06/18  0727 03/05/18  1519   WBC 10*3/mm3 5.08 5.28 4.68 4.66 4.88   HEMOGLOBIN g/dL 13.7 13.7 13.2* 12.6* 12.4*   HEMATOCRIT % 40.6 40.8 39.5 37.8* 36.8*   PLATELETS 10*3/mm3 197 179 156 152 146*           Culture Data:   No results found for: BLOODCX  No results found for: URINECX  No results found for: RESPCX  No results found for: WOUNDCX  No results found for: STOOLCX  No components found for: BODYFLD    Radiology Data:   Imaging Results (last 24 hours)     ** No results found for the last 24 hours. **          I have reviewed the patient current medications.     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                This document has been electronically signed by BRYAN Ocampo on March 9, 2018 11:54 AM

## 2018-03-09 NOTE — PROGRESS NOTES
"   LOS: 4 days   Patient Care Team:  Jamaal Bravo MD as PCP - General  Walker AMADO Thompson DPM as Consulting Physician (Podiatry)    Chief Complaint: left foot infection    Subjective   Patient seen at bedside resting comfortably.  No acute distress.  Denies pain.    History of Present Illness    Subjective    History taken from: patient    Objective     Vital Signs  Temp:  [96.1 °F (35.6 °C)-98 °F (36.7 °C)] 96.1 °F (35.6 °C)  Heart Rate:  [62-89] 69  Resp:  [18] 18  BP: (112-123)/(57-79) 112/71    Objective:  Vital signs: (most recent): Blood pressure 112/71, pulse 69, temperature 96.1 °F (35.6 °C), temperature source Oral, resp. rate 18, height 193 cm (75.98\"), weight 131 kg (288 lb), SpO2 93 %.               Constitutional: well developed, well nourished    HEENT: Normocephalic and atraumatic, normal hearing    Respiratory: Non labored respirations noted    LLE: Dressing is clean, dry and intact.  Upon removal there is decreased erythema noted.  Decreased edema noted.  Well approximated incisions noted to the dorsal fourth, third and second digits.  Hallux is packed open with 2 retention sutures.  No purulent drainage noted.    Psychiatric: A&O x 3 with normal mood and affect. NAD.     Results Review:     I reviewed the patient's new clinical results.      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 Risks and benefits discussed in detail. No guarantees given or implied.  NPO after midnight.   Patient will likely require 6 weeks of IV abx based on bone culture from 3/5  Please do not hesitate to call with questions. "     Marco A Thompson DPM  03/09/18  6:24 AM

## 2018-03-09 NOTE — PLAN OF CARE
Problem: Patient Care Overview (Adult)  Goal: Plan of Care Review  Outcome: Ongoing (interventions implemented as appropriate)   03/09/18 0332   Coping/Psychosocial Response Interventions   Plan Of Care Reviewed With patient   Patient Care Overview   Progress no change   Outcome Evaluation   Outcome Summary/Follow up Plan vss, pain is controlled; usama continue to monitor     Goal: Adult Individualization and Mutuality  Outcome: Ongoing (interventions implemented as appropriate)    Goal: Discharge Needs Assessment  Outcome: Ongoing (interventions implemented as appropriate)      Problem: Pain, Acute (Adult)  Goal: Acceptable Pain Control/Comfort Level  Outcome: Ongoing (interventions implemented as appropriate)

## 2018-03-09 NOTE — PLAN OF CARE
Problem: Patient Care Overview (Adult)  Goal: Plan of Care Review  Outcome: Ongoing (interventions implemented as appropriate)   03/09/18 1620   Coping/Psychosocial Response Interventions   Plan Of Care Reviewed With patient   Patient Care Overview   Progress no change   Outcome Evaluation   Outcome Summary/Follow up Plan pt vss. pain is controlled     Goal: Adult Individualization and Mutuality  Outcome: Ongoing (interventions implemented as appropriate)    Goal: Discharge Needs Assessment  Outcome: Ongoing (interventions implemented as appropriate)      Problem: Pain, Acute (Adult)  Goal: Acceptable Pain Control/Comfort Level  Outcome: Ongoing (interventions implemented as appropriate)

## 2018-03-09 NOTE — PROGRESS NOTES
"    Pharmacokinetics by Pharmacy - Vancomycin    Emre Lisa is a 57 y.o. male  [Ht: 193 cm (75.98\"); Wt: 131 kg (288 lb)]    Estimated Creatinine Clearance: 142.8 mL/min (by C-G formula based on Cr of 0.86).   Lab Results   Component Value Date    CREATININE 0.86 03/09/2018    CREATININE 0.91 03/08/2018    CREATININE 0.91 03/07/2018      Lab Results   Component Value Date    WBC 5.08 03/09/2018    WBC 5.28 03/08/2018    WBC 4.68 03/07/2018      Temp Readings from Last 1 Encounters:   03/09/18 96.7 °F (35.9 °C) (Oral)      Lab Results   Component Value Date    VANCOTROUGH 12.24 03/07/2018         Culture Results:  Microbiology Results (last 10 days)       Procedure Component Value - Date/Time      Tissue / Bone Culture - Tissue, Toe, Left [[id number]]  (Normal) Collected:  03/07/18 1313    Lab Status:  Preliminary result Specimen:  Tissue from Toe, Left Updated:  03/09/18 0620     Tissue Culture No growth at 2 days     Gram Stain Result Rare (1+) WBCs seen      No organisms seen    Narrative:       NO ANAEROBES ISOLATED    Tissue / Bone Culture - Tissue, Toe, Left [[id number]]  (Abnormal) Collected:  03/07/18 1313    Lab Status:  Preliminary result Specimen:  Tissue from Toe, Left Updated:  03/09/18 0752     Tissue Culture --      Light growth (2+) Staphylococcus, coagulase negative (A)      Light growth (2+) Staphylococcus, coagulase negative (A)      different colony type          Gram Stain Result Rare (1+) WBCs seen      No organisms seen    Narrative:       Slide reviewed confirmed  NO ANAEROBES ISOLATED    Hardware / Foreign Body Culture - Hardware / Foreign Body, Foot, Left [[id number]]  (Abnormal) Collected:  03/05/18 2127    Lab Status:  Final result Specimen:  Hardware / Foreign Body from Foot, Left Updated:  03/08/18 0716     Hardware / Foreign Body Culture Culture in progress      Light growth (2+) Staphylococcus, coagulase negative (A)      Susceptibility not indicated          Gram Stain Result " Rare (1+) WBCs seen      No No organisms seen    Tissue / Bone Culture - Bone, Foot, Left [[id number]]  (Abnormal)  (Susceptibility) Collected:  03/05/18 1804    Lab Status:  Final result Specimen:  Bone from Foot, Left Updated:  03/09/18 1001     Tissue Culture --      Moderate growth (3+) Staphylococcus lugdunensis (A)      Corrected result. Previously Reported Organism Changed. Previous result was Staphylococcus, coagulase negative on 3/7/2018 at 0839 CST.        Gram Stain Result Few (2+) WBCs seen      Few (2+) Gram positive cocci in pairs    Narrative:       no anaerobes isolated    Susceptibility        Staphylococcus lugdunensis     MARTINA     Clindamycin >4 ug/ml Resistant     Levofloxacin <=1 ug/ml Susceptible  [1]      Oxacillin 1 ug/ml Susceptible     Penicillin G 0.12 ug/ml Susceptible     Tetracycline <=4 ug/ml Susceptible     Trimethoprim + Sulfamethoxazole <=0.5/9.5 ug/ml Susceptible     Vancomycin 1 ug/ml Susceptible              [1]   Staphylococcus species may develop resistance during prolonged therapy with quinolones.  Isolates that are initially susceptible may become resistant within three to four days after initiation of therapy. Testing of repeat isolates may be warranted.                   Anaerobic Culture - Wound, Foot, Left [[id number]]  (Normal) Collected:  03/05/18 1751    Lab Status:  Final result Specimen:  Wound from Foot, Left Updated:  03/07/18 0838     Culture No anaerobes isolated     Gram Stain Result Rare (1+) WBCs seen      No organisms seen    Wound Culture - Wound, Foot, Left [[id number]]  (Abnormal) Collected:  03/05/18 1751    Lab Status:  Final result Specimen:  Wound from Foot, Left Updated:  03/08/18 0710     Wound Culture --      Moderate growth (3+) Staphylococcus, coagulase negative (A)      Susceptibility not indicated          Gram Stain Result Rare (1+) Gram positive cocci in pairs      Few (2+) WBCs seen               Indication for use: osteomyelitis    Current  Vancomycin Dose:  2000 mg IVPB every 12 hours, day 5 of therapy.      Assessment/Plan:.   Reviewed above labs and cultures.   Based on lab from 3/5, pt will need antibiotics for 6 weeks for osteomyelitis.  Last trough level is low for osteo. Will change the dose to 1500 mg IV every 8 hours.   Calculated AUC is 575.57 ( goal 400-600) , calculated trough is 16.73 (goal 10-20) and calculated peak is 25.62  (goal range 30-40). Ordered a trough level for 03/10 at 1130. Pharmacy will follow and adjust dose if needed.    Sowmya Verduzco RPH   03/09/18 10:46 AM

## 2018-03-10 ENCOUNTER — ANESTHESIA (OUTPATIENT)
Dept: PERIOP | Facility: HOSPITAL | Age: 58
End: 2018-03-10

## 2018-03-10 LAB
ALBUMIN SERPL-MCNC: 3.4 G/DL (ref 3.4–4.8)
ALBUMIN/GLOB SERPL: 1.1 G/DL (ref 1.1–1.8)
ALP SERPL-CCNC: 50 U/L (ref 38–126)
ALT SERPL W P-5'-P-CCNC: 129 U/L (ref 21–72)
ANION GAP SERPL CALCULATED.3IONS-SCNC: 13 MMOL/L (ref 5–15)
AST SERPL-CCNC: 87 U/L (ref 17–59)
BASOPHILS # BLD AUTO: 0.03 10*3/MM3 (ref 0–0.2)
BASOPHILS NFR BLD AUTO: 0.5 % (ref 0–2)
BILIRUB SERPL-MCNC: 0.2 MG/DL (ref 0.2–1.3)
BUN BLD-MCNC: 20 MG/DL (ref 7–21)
BUN/CREAT SERPL: 22 (ref 7–25)
CALCIUM SPEC-SCNC: 8.6 MG/DL (ref 8.4–10.2)
CHLORIDE SERPL-SCNC: 103 MMOL/L (ref 95–110)
CO2 SERPL-SCNC: 25 MMOL/L (ref 22–31)
CREAT BLD-MCNC: 0.91 MG/DL (ref 0.7–1.3)
DEPRECATED RDW RBC AUTO: 40.8 FL (ref 35.1–43.9)
EOSINOPHIL # BLD AUTO: 0.2 10*3/MM3 (ref 0–0.7)
EOSINOPHIL NFR BLD AUTO: 3.4 % (ref 0–7)
ERYTHROCYTE [DISTWIDTH] IN BLOOD BY AUTOMATED COUNT: 12.7 % (ref 11.5–14.5)
GFR SERPL CREATININE-BSD FRML MDRD: 86 ML/MIN/1.73 (ref 56–130)
GLOBULIN UR ELPH-MCNC: 3.2 GM/DL (ref 2.3–3.5)
GLUCOSE BLD-MCNC: 96 MG/DL (ref 60–100)
GLUCOSE BLDC GLUCOMTR-MCNC: 109 MG/DL (ref 70–130)
GLUCOSE BLDC GLUCOMTR-MCNC: 115 MG/DL (ref 70–130)
GLUCOSE BLDC GLUCOMTR-MCNC: 80 MG/DL (ref 70–130)
HCT VFR BLD AUTO: 39 % (ref 39–49)
HGB BLD-MCNC: 13.2 G/DL (ref 13.7–17.3)
IMM GRANULOCYTES # BLD: 0.03 10*3/MM3 (ref 0–0.02)
IMM GRANULOCYTES NFR BLD: 0.5 % (ref 0–0.5)
LYMPHOCYTES # BLD AUTO: 1.79 10*3/MM3 (ref 0.6–4.2)
LYMPHOCYTES NFR BLD AUTO: 30 % (ref 10–50)
MCH RBC QN AUTO: 29.7 PG (ref 26.5–34)
MCHC RBC AUTO-ENTMCNC: 33.8 G/DL (ref 31.5–36.3)
MCV RBC AUTO: 87.8 FL (ref 80–98)
MONOCYTES # BLD AUTO: 0.61 10*3/MM3 (ref 0–0.9)
MONOCYTES NFR BLD AUTO: 10.2 % (ref 0–12)
NEUTROPHILS # BLD AUTO: 3.3 10*3/MM3 (ref 2–8.6)
NEUTROPHILS NFR BLD AUTO: 55.4 % (ref 37–80)
NRBC BLD MANUAL-RTO: 0 /100 WBC (ref 0–0)
PLATELET # BLD AUTO: 174 10*3/MM3 (ref 150–450)
PMV BLD AUTO: 10 FL (ref 8–12)
POTASSIUM BLD-SCNC: 4.3 MMOL/L (ref 3.5–5.1)
PROT SERPL-MCNC: 6.6 G/DL (ref 6.3–8.6)
RBC # BLD AUTO: 4.44 10*6/MM3 (ref 4.37–5.74)
SODIUM BLD-SCNC: 141 MMOL/L (ref 137–145)
VANCOMYCIN TROUGH SERPL-MCNC: 21.66 MCG/ML (ref 10–15)
WBC NRBC COR # BLD: 5.96 10*3/MM3 (ref 3.2–9.8)

## 2018-03-10 PROCEDURE — 25010000002 PROPOFOL 10 MG/ML EMULSION: Performed by: NURSE ANESTHETIST, CERTIFIED REGISTERED

## 2018-03-10 PROCEDURE — 25010000002 VANCOMYCIN PER 500 MG: Performed by: PODIATRIST

## 2018-03-10 PROCEDURE — 25010000002 FENTANYL CITRATE (PF) 100 MCG/2ML SOLUTION: Performed by: NURSE ANESTHETIST, CERTIFIED REGISTERED

## 2018-03-10 PROCEDURE — 80053 COMPREHEN METABOLIC PANEL: CPT | Performed by: NURSE PRACTITIONER

## 2018-03-10 PROCEDURE — 85025 COMPLETE CBC W/AUTO DIFF WBC: CPT | Performed by: NURSE PRACTITIONER

## 2018-03-10 PROCEDURE — 80202 ASSAY OF VANCOMYCIN: CPT | Performed by: PODIATRIST

## 2018-03-10 PROCEDURE — 0JQR0ZZ REPAIR LEFT FOOT SUBCUTANEOUS TISSUE AND FASCIA, OPEN APPROACH: ICD-10-PCS | Performed by: PODIATRIST

## 2018-03-10 PROCEDURE — 13160 SEC CLSR SURG WND/DEHSN XTN: CPT | Performed by: PODIATRIST

## 2018-03-10 PROCEDURE — 25010000002 MIDAZOLAM PER 1 MG: Performed by: NURSE ANESTHETIST, CERTIFIED REGISTERED

## 2018-03-10 PROCEDURE — 82962 GLUCOSE BLOOD TEST: CPT

## 2018-03-10 RX ORDER — DIPHENHYDRAMINE HYDROCHLORIDE 50 MG/ML
12.5 INJECTION INTRAMUSCULAR; INTRAVENOUS
Status: DISCONTINUED | OUTPATIENT
Start: 2018-03-10 | End: 2018-03-10 | Stop reason: HOSPADM

## 2018-03-10 RX ORDER — MEPERIDINE HYDROCHLORIDE 50 MG/ML
12.5 INJECTION INTRAMUSCULAR; INTRAVENOUS; SUBCUTANEOUS
Status: DISCONTINUED | OUTPATIENT
Start: 2018-03-10 | End: 2018-03-10 | Stop reason: HOSPADM

## 2018-03-10 RX ORDER — NALOXONE HCL 0.4 MG/ML
0.2 VIAL (ML) INJECTION AS NEEDED
Status: DISCONTINUED | OUTPATIENT
Start: 2018-03-10 | End: 2018-03-10 | Stop reason: HOSPADM

## 2018-03-10 RX ORDER — LABETALOL HYDROCHLORIDE 5 MG/ML
5 INJECTION, SOLUTION INTRAVENOUS
Status: DISCONTINUED | OUTPATIENT
Start: 2018-03-10 | End: 2018-03-10 | Stop reason: HOSPADM

## 2018-03-10 RX ORDER — EPHEDRINE SULFATE 50 MG/ML
5 INJECTION, SOLUTION INTRAVENOUS ONCE AS NEEDED
Status: DISCONTINUED | OUTPATIENT
Start: 2018-03-10 | End: 2018-03-10 | Stop reason: HOSPADM

## 2018-03-10 RX ORDER — FENTANYL CITRATE 50 UG/ML
INJECTION, SOLUTION INTRAMUSCULAR; INTRAVENOUS AS NEEDED
Status: DISCONTINUED | OUTPATIENT
Start: 2018-03-10 | End: 2018-03-10 | Stop reason: SURG

## 2018-03-10 RX ORDER — LIDOCAINE HYDROCHLORIDE 20 MG/ML
INJECTION, SOLUTION INFILTRATION; PERINEURAL AS NEEDED
Status: DISCONTINUED | OUTPATIENT
Start: 2018-03-10 | End: 2018-03-10 | Stop reason: SURG

## 2018-03-10 RX ORDER — ACETAMINOPHEN 325 MG/1
650 TABLET ORAL ONCE AS NEEDED
Status: DISCONTINUED | OUTPATIENT
Start: 2018-03-10 | End: 2018-03-10 | Stop reason: HOSPADM

## 2018-03-10 RX ORDER — FLUMAZENIL 0.1 MG/ML
0.2 INJECTION INTRAVENOUS AS NEEDED
Status: DISCONTINUED | OUTPATIENT
Start: 2018-03-10 | End: 2018-03-10 | Stop reason: HOSPADM

## 2018-03-10 RX ORDER — ONDANSETRON 2 MG/ML
4 INJECTION INTRAMUSCULAR; INTRAVENOUS ONCE AS NEEDED
Status: DISCONTINUED | OUTPATIENT
Start: 2018-03-10 | End: 2018-03-10 | Stop reason: HOSPADM

## 2018-03-10 RX ORDER — MIDAZOLAM HYDROCHLORIDE 1 MG/ML
INJECTION INTRAMUSCULAR; INTRAVENOUS AS NEEDED
Status: DISCONTINUED | OUTPATIENT
Start: 2018-03-10 | End: 2018-03-10 | Stop reason: SURG

## 2018-03-10 RX ORDER — ACETAMINOPHEN 650 MG/1
650 SUPPOSITORY RECTAL ONCE AS NEEDED
Status: DISCONTINUED | OUTPATIENT
Start: 2018-03-10 | End: 2018-03-10 | Stop reason: HOSPADM

## 2018-03-10 RX ORDER — PROPOFOL 10 MG/ML
VIAL (ML) INTRAVENOUS AS NEEDED
Status: DISCONTINUED | OUTPATIENT
Start: 2018-03-10 | End: 2018-03-10 | Stop reason: SURG

## 2018-03-10 RX ADMIN — DRONEDARONE 400 MG: 400 TABLET, FILM COATED ORAL at 21:57

## 2018-03-10 RX ADMIN — VALSARTAN 40 MG: 40 TABLET, FILM COATED ORAL at 21:57

## 2018-03-10 RX ADMIN — HYDROCORTISONE: 1 CREAM TOPICAL at 22:40

## 2018-03-10 RX ADMIN — VITAMIN E CAP 100 UNIT 200 UNITS: 100 CAP at 21:58

## 2018-03-10 RX ADMIN — SODIUM CHLORIDE 100 ML/HR: 900 INJECTION, SOLUTION INTRAVENOUS at 11:48

## 2018-03-10 RX ADMIN — LIDOCAINE HYDROCHLORIDE 60 MG: 20 INJECTION, SOLUTION INFILTRATION; PERINEURAL at 07:18

## 2018-03-10 RX ADMIN — SODIUM CHLORIDE 100 ML/HR: 900 INJECTION, SOLUTION INTRAVENOUS at 06:20

## 2018-03-10 RX ADMIN — PROPOFOL 20 MG: 10 INJECTION, EMULSION INTRAVENOUS at 07:54

## 2018-03-10 RX ADMIN — PROPOFOL 20 MG: 10 INJECTION, EMULSION INTRAVENOUS at 07:41

## 2018-03-10 RX ADMIN — VANCOMYCIN HYDROCHLORIDE 1500 MG: 5 INJECTION, POWDER, LYOPHILIZED, FOR SOLUTION INTRAVENOUS at 04:23

## 2018-03-10 RX ADMIN — MAGNESIUM OXIDE TAB 400 MG (241.3 MG ELEMENTAL MG) 400 MG: 400 (241.3 MG) TAB at 21:57

## 2018-03-10 RX ADMIN — ACETAMINOPHEN 650 MG: 325 TABLET ORAL at 23:29

## 2018-03-10 RX ADMIN — METOPROLOL TARTRATE 12.5 MG: 25 TABLET, FILM COATED ORAL at 21:58

## 2018-03-10 RX ADMIN — PROPOFOL 70 MG: 10 INJECTION, EMULSION INTRAVENOUS at 07:18

## 2018-03-10 RX ADMIN — GLIPIZIDE 5 MG: 5 TABLET ORAL at 08:45

## 2018-03-10 RX ADMIN — MAGNESIUM OXIDE TAB 400 MG (241.3 MG ELEMENTAL MG) 400 MG: 400 (241.3 MG) TAB at 08:46

## 2018-03-10 RX ADMIN — MIDAZOLAM 2 MG: 1 INJECTION INTRAMUSCULAR; INTRAVENOUS at 07:14

## 2018-03-10 RX ADMIN — OXYCODONE HYDROCHLORIDE AND ACETAMINOPHEN 500 MG: 500 TABLET ORAL at 21:57

## 2018-03-10 RX ADMIN — PROPOFOL 10 MG: 10 INJECTION, EMULSION INTRAVENOUS at 07:34

## 2018-03-10 RX ADMIN — FENTANYL CITRATE 50 MCG: 50 INJECTION, SOLUTION INTRAMUSCULAR; INTRAVENOUS at 07:18

## 2018-03-10 RX ADMIN — SODIUM CHLORIDE 100 ML/HR: 900 INJECTION, SOLUTION INTRAVENOUS at 21:59

## 2018-03-10 RX ADMIN — HYDROCORTISONE: 1 CREAM TOPICAL at 08:46

## 2018-03-10 RX ADMIN — VANCOMYCIN HYDROCHLORIDE 1750 MG: 5 INJECTION, POWDER, LYOPHILIZED, FOR SOLUTION INTRAVENOUS at 22:40

## 2018-03-10 NOTE — PROGRESS NOTES
"    Pharmacokinetics by Pharmacy - Vancomycin    Emre Lisa is a 57 y.o. male  [Ht: 193 cm (75.98\"); Wt: 131 kg (288 lb)]    Estimated Creatinine Clearance: 134.9 mL/min (by C-G formula based on SCr of 0.91 mg/dL).   Lab Results   Component Value Date    CREATININE 0.91 03/10/2018    CREATININE 0.86 03/09/2018    CREATININE 0.91 03/08/2018      Lab Results   Component Value Date    WBC 5.96 03/10/2018    WBC 5.08 03/09/2018    WBC 5.28 03/08/2018      Temp Readings from Last 1 Encounters:   03/10/18 96 °F (35.6 °C) (Axillary)      Lab Results   Component Value Date    VANCOTROUGH 21.66 (H) 03/10/2018         Culture Results:  Microbiology Results (last 10 days)       Procedure Component Value - Date/Time    Tissue / Bone Culture - Tissue, Toe, Left [[id number]]  (Normal) Collected:  03/07/18 1313    Lab Status:  Preliminary result Specimen:  Tissue from Toe, Left Updated:  03/10/18 0728     Tissue Culture No growth at 3 days     Gram Stain Result Rare (1+) WBCs seen      No organisms seen    Narrative:       NO ANAEROBES ISOLATED    Tissue / Bone Culture - Tissue, Toe, Left [[id number]]  (Abnormal)  (Susceptibility) Collected:  03/07/18 1313    Lab Status:  Preliminary result Specimen:  Tissue from Toe, Left Updated:  03/10/18 0939     Tissue Culture --      Light growth (2+) Staphylococcus, coagulase negative (A)      Light growth (2+) Staphylococcus warneri (A)     Comment: different colony type          Gram Stain Result Rare (1+) WBCs seen      No organisms seen    Narrative:       Slide reviewed confirmed  NO ANAEROBES ISOLATED    Susceptibility        Staphylococcus warneri     MARTINA     Clindamycin <=0.5 ug/ml Susceptible     Erythromycin 4 ug/ml Intermediate     Gentamicin <=4 ug/ml Susceptible     Oxacillin 0.5 ug/ml Resistant     Penicillin G 2 ug/ml Resistant     Tetracycline <=4 ug/ml Susceptible     Trimethoprim + Sulfamethoxazole <=0.5/9.5 ug/ml Susceptible     Vancomycin 1 ug/ml Susceptible       "                Hardware / Foreign Body Culture - Hardware / Foreign Body, Foot, Left [[id number]]  (Abnormal) Collected:  03/05/18 2127    Lab Status:  Final result Specimen:  Hardware / Foreign Body from Foot, Left Updated:  03/08/18 0716     Hardware / Foreign Body Culture Culture in progress      Light growth (2+) Staphylococcus, coagulase negative (A)     Comment: Susceptibility not indicated          Gram Stain Result Rare (1+) WBCs seen      No No organisms seen    Tissue / Bone Culture - Bone, Foot, Left [[id number]]  (Abnormal)  (Susceptibility) Collected:  03/05/18 1804    Lab Status:  Final result Specimen:  Bone from Foot, Left Updated:  03/09/18 1001     Tissue Culture --      Moderate growth (3+) Staphylococcus lugdunensis (A)     Comment: Corrected result. Previously Reported Organism Changed. Previous result was Staphylococcus, coagulase negative on 3/7/2018 at 0839 CST.        Gram Stain Result Few (2+) WBCs seen      Few (2+) Gram positive cocci in pairs    Narrative:       no anaerobes isolated    Susceptibility        Staphylococcus lugdunensis     MARTINA     Clindamycin >4 ug/ml Resistant     Levofloxacin <=1 ug/ml Susceptible  [1]      Oxacillin 1 ug/ml Susceptible     Penicillin G 0.12 ug/ml Susceptible     Tetracycline <=4 ug/ml Susceptible     Trimethoprim + Sulfamethoxazole <=0.5/9.5 ug/ml Susceptible     Vancomycin 1 ug/ml Susceptible              [1]   Staphylococcus species may develop resistance during prolonged therapy with quinolones.  Isolates that are initially susceptible may become resistant within three to four days after initiation of therapy. Testing of repeat isolates may be warranted.                   Anaerobic Culture - Wound, Foot, Left [[id number]]  (Normal) Collected:  03/05/18 1751    Lab Status:  Final result Specimen:  Wound from Foot, Left Updated:  03/07/18 0838     Culture No anaerobes isolated     Gram Stain Result Rare (1+) WBCs seen      No organisms seen    Wound  Culture - Wound, Foot, Left [[id number]]  (Abnormal) Collected:  03/05/18 1751    Lab Status:  Final result Specimen:  Wound from Foot, Left Updated:  03/08/18 0710     Wound Culture --      Moderate growth (3+) Staphylococcus, coagulase negative (A)     Comment: Susceptibility not indicated          Gram Stain Result Rare (1+) Gram positive cocci in pairs      Few (2+) WBCs seen               Indication for use: osteomyelitis    Current Vancomycin Dose:  1500 mg IVPB every 8 hours, day 6 of therapy.      Assessment/Plan:  Reviewed above labs and cultures. Vancomycin trough level from 03/10 at 1122 is 21.66 (goal 10-20). Lab levels were drawn at the correct time. Will decrease current dose to 1750 mg IV every 12 hours to start tonight at 2200. Ordered trough level for 03/12 at 0930. Calculated AUC is 542.75 (goal 400-600), calculated trough level is 14.4 ( goal 10-20). WBC is normal and Cr is stable.  Pharmacy will continue to monitor renal function and adjust dose accordingly.    Sowmya Verduzco [facility]   03/10/18 12:27 PM

## 2018-03-10 NOTE — ANESTHESIA PREPROCEDURE EVALUATION
Anesthesia Evaluation     Patient summary reviewed and Nursing notes reviewed   no history of anesthetic complications:  NPO Solid Status: > 8 hours  NPO Liquid Status: > 8 hours           Airway   Mallampati: II  TM distance: >3 FB  Neck ROM: full  Possible difficult intubation  Comment: His neck has decreased extension as well as decreased ability to turn to left or right  Has a beard  Dental - normal exam   (+) poor dentation    Pulmonary - negative pulmonary ROS and normal exam    breath sounds clear to auscultation    PE comment: AICD is in left chest wall  Cardiovascular - normal exam  Exercise tolerance: good (4-7 METS)    ECG reviewed  Patient on routine beta blocker and Beta blocker given within 24 hours of surgery  Rhythm: regular  Rate: normal    (+) pacemaker ( originally inserted in 2010 and replaced 7/2016. Has not been discharged since placement.) ICD, pacemaker, hypertension well controlled, past MI ( he states he had a silent MI 30 years ago and had clean coronary arteries in a 2010 catheterization.)  >12 months, dysrhythmias ( currently in NSR, controlled with metoprolol and multaq) Atrial Fib, DVT (possible DVT on surgical side),   (-) murmur    ROS comment: History cardiac defibrillator    Normal sinus rhythm  Normal ECG  When compared with ECG of 07-NOV-2016 16:29,  premature ventricular complexes are no longer present  Confirmed by EMELINA    Neuro/Psych- negative ROS  GI/Hepatic/Renal/Endo    (+) obesity,   diabetes mellitus (glu 86 this am) type 2 well controlled,   GERD:  he is on protonix= ordered pre-op, but he denies having GERD.    Musculoskeletal     (+) arthralgias,       ROS comment: Recent hammer toe surgery left foot  Abdominal   (+) obese,    Substance History - negative use     OB/GYN negative ob/gyn ROS         Other   (+) arthritis                       Anesthesia Plan    ASA 3     general     intravenous induction   Anesthetic plan and risks discussed with patient.

## 2018-03-10 NOTE — ANESTHESIA POSTPROCEDURE EVALUATION
Patient: Emre Lisa    Procedure Summary     Date:  03/10/18 Room / Location:  [facility] OR  / [facility] OR    Anesthesia Start:  0715 Anesthesia Stop:      Procedure:  FOOT IRRIGATION, DEBRIDEMENT AND REPAIR (Left Foot) Diagnosis:       Cellulitis of left foot      Infected hardware in left lower extremity, subsequent encounter      (Cellulitis of left foot [L03.116])      (Infected hardware in left lower extremity, subsequent encounter [T84.7XXD])    Surgeon:  Marco A Thompson DPM Provider:  Fei Buenrostro MD    Anesthesia Type:  general ASA Status:  3          Anesthesia Type: general  Last vitals  BP   100/61 (03/10/18 0500)   Temp   96.9 °F (36.1 °C) (03/10/18 0500)   Pulse   76 (03/10/18 0500)   Resp   18 (03/10/18 0500)     SpO2   95 % (03/10/18 0500)     Post Anesthesia Care and Evaluation    Patient location during evaluation: bedside  Patient participation: complete - patient participated  Level of consciousness: awake and alert  Pain score: 0  Pain management: adequate  Airway patency: patent  Anesthetic complications: No anesthetic complications  PONV Status: none  Cardiovascular status: acceptable  Respiratory status: acceptable  Hydration status: acceptable

## 2018-03-10 NOTE — BRIEF OP NOTE
FOOT IRRIGATION, DEBRIDEMENT AND REPAIR  Progress Note    Emre Lisa  3/5/2018 - 3/10/2018    Pre-op Diagnosis:   Cellulitis of left foot [L03.116]  Infected hardware in left lower extremity, subsequent encounter [T84.7XXD]       Post-Op Diagnosis Codes:     * Cellulitis of left foot [L03.116]     * Infected hardware in left lower extremity, subsequent encounter [T84.7XXD]    Procedure/CPT® Codes:      Procedure(s):  FOOT IRRIGATION, DEBRIDEMENT AND REPAIR    Surgeon(s):  Marco A Thompson DPM    Anesthesia: Monitor Anesthesia Care    Staff:   Circulator: Galina Cordero RN  Scrub Person: Fernandez Castillo  Assistant: Megan Pate    Estimated Blood Loss: minimal    Urine Voided: * No values recorded between 3/10/2018  7:15 AM and 3/10/2018  8:04 AM *    Specimens:                None      Drains:      Findings: consistent with pre-op dx    Complications: none      Marco A Thompson DPM     Date: 3/10/2018  Time: 8:04 AM

## 2018-03-10 NOTE — PLAN OF CARE
Problem: Patient Care Overview  Goal: Plan of Care Review  Outcome: Ongoing (interventions implemented as appropriate)   03/10/18 1503   Coping/Psychosocial   Plan of Care Reviewed With patient   Plan of Care Review   Progress no change   OTHER   Outcome Summary pt vss. No complaints of pain this shift.     Goal: Individualization and Mutuality  Outcome: Ongoing (interventions implemented as appropriate)    Goal: Discharge Needs Assessment  Outcome: Ongoing (interventions implemented as appropriate)      Problem: Pain, Acute (Adult)  Goal: Identify Related Risk Factors and Signs and Symptoms  Outcome: Outcome(s) achieved Date Met: 03/10/18    Goal: Acceptable Pain Control/Comfort Level  Outcome: Ongoing (interventions implemented as appropriate)

## 2018-03-10 NOTE — OP NOTE
Date of Procedure: 03/10/18     SURGEON: Marco A Thompson DPM      ASSISTANT: LLOYD Richardson      PREOPERATIVE DIAGNOSIS: Osteomyelitis left foot     POSTOPERATIVE DIAGNOSIS: Osteomyelitis left foot     PROCEDURES PERFORMED: Delayed primary closure left foot     ANESTHESIA: IV sedation     HEMOSTASIS: Direct pressure      ESTIMATED BLOOD LOSS: 10 mL.      MATERIALS: None     INJECTABLES: None     COMPLICATIONS: None.      CONDITION: Stable.     PATHOLOGY: None     INDICATIONS FOR PROCEDURE: This is a patient of mine who has hardware infection osteomyelitis to his left great toe.  He has already had hardware removal and irrigation and debridement procedures.  Plan today is for delayed closure of the surgical wound.  He agrees to undergo surgical intervention at this time. Consent is signed and documented in the chart. History and physical exam were reviewed prior to patient being taken to the operating room and n.p.o. status was confirmed. No guarantees were given or implied regarding the outcome of this treatment.      DESCRIPTION OF PROCEDURE: After mild sedation was administered by the anesthesia team in the preoperative holding area the patient was transported to the operating room and placed in a supine position.  IV sedation was then administered and the left foot was prepped and draped in usual sterile technique and a timeout was performed to confirm the correct patient, correct site, and correct procedure.      Attention was then directed to the left foot with the hallux incision site was loosely approximated with 2 nylon sutures and was packed with iodoform packing gauze.  Erythema and edema noted in previous surgical cases has resolved at this time.  No foul odor purulent drainage is noted.  At this time the nylon sutures removed and the incision site was flushed with copious amounts of antibiotic irrigation.  Next a layered closure was performed utilizing 3-0 Vicryl for the deep tissue followed by  3-0 Prolene in a simple interrupted technique for the skin.  A sterile dressing was applied.  The patient tolerated the procedure and anesthesia well and was transported from the operating room to the PACU with vital signs stable and neurovascular status intact to the operative extremity.    The plan is to discharge patient back to the floor once stable per anesthesia.  He can resume his normal diet.  He can be weightbearing as tolerated in surgical shoe.  He is to keep his dressing clean, dry and intact.  No further surgical intervention planned on this admission.  Plan is for 6 weeks of IV antibiotics based on previous bone culture result.  The patient will follow up with me 1 week following discharge from the hospital.             This document has been electronically signed by Marco A Thompson DPM on March 10, 2018 8:54 AM

## 2018-03-11 LAB
ALBUMIN SERPL-MCNC: 3.5 G/DL (ref 3.4–4.8)
ALBUMIN/GLOB SERPL: 1 G/DL (ref 1.1–1.8)
ALP SERPL-CCNC: 51 U/L (ref 38–126)
ALT SERPL W P-5'-P-CCNC: 103 U/L (ref 21–72)
ANION GAP SERPL CALCULATED.3IONS-SCNC: 11 MMOL/L (ref 5–15)
AST SERPL-CCNC: 54 U/L (ref 17–59)
BACTERIA SPEC AEROBE CULT: ABNORMAL
BASOPHILS # BLD AUTO: 0.03 10*3/MM3 (ref 0–0.2)
BASOPHILS NFR BLD AUTO: 0.5 % (ref 0–2)
BILIRUB SERPL-MCNC: 0.2 MG/DL (ref 0.2–1.3)
BUN BLD-MCNC: 21 MG/DL (ref 7–21)
BUN/CREAT SERPL: 21.2 (ref 7–25)
CALCIUM SPEC-SCNC: 9 MG/DL (ref 8.4–10.2)
CHLORIDE SERPL-SCNC: 101 MMOL/L (ref 95–110)
CO2 SERPL-SCNC: 27 MMOL/L (ref 22–31)
CREAT BLD-MCNC: 0.99 MG/DL (ref 0.7–1.3)
DEPRECATED RDW RBC AUTO: 40.6 FL (ref 35.1–43.9)
EOSINOPHIL # BLD AUTO: 0.16 10*3/MM3 (ref 0–0.7)
EOSINOPHIL NFR BLD AUTO: 2.6 % (ref 0–7)
ERYTHROCYTE [DISTWIDTH] IN BLOOD BY AUTOMATED COUNT: 12.7 % (ref 11.5–14.5)
GFR SERPL CREATININE-BSD FRML MDRD: 78 ML/MIN/1.73 (ref 60–130)
GLOBULIN UR ELPH-MCNC: 3.4 GM/DL (ref 2.3–3.5)
GLUCOSE BLD-MCNC: 99 MG/DL (ref 60–100)
GLUCOSE BLDC GLUCOMTR-MCNC: 107 MG/DL (ref 70–130)
GLUCOSE BLDC GLUCOMTR-MCNC: 113 MG/DL (ref 70–130)
GLUCOSE BLDC GLUCOMTR-MCNC: 122 MG/DL (ref 70–130)
GLUCOSE BLDC GLUCOMTR-MCNC: 88 MG/DL (ref 70–130)
GRAM STN SPEC: ABNORMAL
GRAM STN SPEC: ABNORMAL
HCT VFR BLD AUTO: 39 % (ref 39–49)
HGB BLD-MCNC: 12.9 G/DL (ref 13.7–17.3)
IMM GRANULOCYTES # BLD: 0.03 10*3/MM3 (ref 0–0.02)
IMM GRANULOCYTES NFR BLD: 0.5 % (ref 0–0.5)
LYMPHOCYTES # BLD AUTO: 1.85 10*3/MM3 (ref 0.6–4.2)
LYMPHOCYTES NFR BLD AUTO: 30 % (ref 10–50)
MCH RBC QN AUTO: 29.1 PG (ref 26.5–34)
MCHC RBC AUTO-ENTMCNC: 33.1 G/DL (ref 31.5–36.3)
MCV RBC AUTO: 87.8 FL (ref 80–98)
MONOCYTES # BLD AUTO: 0.6 10*3/MM3 (ref 0–0.9)
MONOCYTES NFR BLD AUTO: 9.7 % (ref 0–12)
NEUTROPHILS # BLD AUTO: 3.5 10*3/MM3 (ref 2–8.6)
NEUTROPHILS NFR BLD AUTO: 56.7 % (ref 37–80)
PLATELET # BLD AUTO: 176 10*3/MM3 (ref 150–450)
PMV BLD AUTO: 10.1 FL (ref 8–12)
POTASSIUM BLD-SCNC: 4.2 MMOL/L (ref 3.5–5.1)
PROT SERPL-MCNC: 6.9 G/DL (ref 6.3–8.6)
RBC # BLD AUTO: 4.44 10*6/MM3 (ref 4.37–5.74)
SODIUM BLD-SCNC: 139 MMOL/L (ref 137–145)
WBC NRBC COR # BLD: 6.17 10*3/MM3 (ref 3.2–9.8)

## 2018-03-11 PROCEDURE — 82962 GLUCOSE BLOOD TEST: CPT

## 2018-03-11 PROCEDURE — 25010000002 VANCOMYCIN PER 500 MG: Performed by: PODIATRIST

## 2018-03-11 PROCEDURE — 85025 COMPLETE CBC W/AUTO DIFF WBC: CPT | Performed by: NURSE PRACTITIONER

## 2018-03-11 PROCEDURE — 80053 COMPREHEN METABOLIC PANEL: CPT | Performed by: NURSE PRACTITIONER

## 2018-03-11 RX ADMIN — METOPROLOL TARTRATE 12.5 MG: 25 TABLET, FILM COATED ORAL at 20:45

## 2018-03-11 RX ADMIN — GLIPIZIDE 5 MG: 5 TABLET ORAL at 08:26

## 2018-03-11 RX ADMIN — DRONEDARONE 400 MG: 400 TABLET, FILM COATED ORAL at 20:45

## 2018-03-11 RX ADMIN — MAGNESIUM OXIDE TAB 400 MG (241.3 MG ELEMENTAL MG) 400 MG: 400 (241.3 MG) TAB at 20:45

## 2018-03-11 RX ADMIN — VITAMIN E CAP 100 UNIT 200 UNITS: 100 CAP at 20:45

## 2018-03-11 RX ADMIN — VANCOMYCIN HYDROCHLORIDE 1750 MG: 5 INJECTION, POWDER, LYOPHILIZED, FOR SOLUTION INTRAVENOUS at 11:05

## 2018-03-11 RX ADMIN — OXYCODONE HYDROCHLORIDE AND ACETAMINOPHEN 500 MG: 500 TABLET ORAL at 20:45

## 2018-03-11 RX ADMIN — VANCOMYCIN HYDROCHLORIDE 1750 MG: 5 INJECTION, POWDER, LYOPHILIZED, FOR SOLUTION INTRAVENOUS at 23:46

## 2018-03-11 RX ADMIN — VALSARTAN 40 MG: 40 TABLET, FILM COATED ORAL at 23:46

## 2018-03-11 RX ADMIN — HYDROCORTISONE: 1 CREAM TOPICAL at 08:26

## 2018-03-11 RX ADMIN — MAGNESIUM OXIDE TAB 400 MG (241.3 MG ELEMENTAL MG) 400 MG: 400 (241.3 MG) TAB at 08:26

## 2018-03-11 RX ADMIN — ACETAMINOPHEN 650 MG: 325 TABLET ORAL at 04:27

## 2018-03-11 NOTE — PROGRESS NOTES
[facility] Medicine Services  INPATIENT PROGRESS NOTE    Length of Stay: 6  Date of Admission: 3/5/2018  Primary Care Physician: Jamaal Bravo MD    Subjective   Please note that all previous progress notes, medication changes, lab findings, Radiographical changes, and physical exam findings have been noted and updated as appropriate.    3/11/2018: Continues to have no complaints.  Continues to grow staph species, multiple colonies.  All are sensitive to vancomycin.  Secondary to sensitivity to vancomycin, are also sensitive to Zyvox.  Have discussed with pharmacy.  Zyvox is indicated for osteomyelitis, however there are multiple side effects to using Zyvox for a prolonged period of time.  We will have to discuss risk and benefits with patient and furthermore with pharmacy as to whether or not we will continue with vancomycin versus Zyvox.  Vancomycin changed to every 12 hours from every 8 hours.    3/10/2018: Patient has no pain or complaints.  Did undergo further operative washout procedure stay.  Dr. Thompson states that the patient foot looks much better and that he is cleared from podiatry point view, but still needs at least 2-4 weeks of IV antibiotics, vancomycin, followed by oral antibiotics for another 2-4 weeks.  At this time or he with case management to get approval for IV antibiotics outpatient.  At this time patient has vancomycin every 8 hours, patient may require LTAC or acute rehabilitation.    3/9/2018: Patient has no complaints.  Dr. Thompson has recommended magic butt cream for moisturization of right foot.  No fever or chills, no nausea or vomiting.  Will have a follow-up OR procedure tomorrow with Dr. Thompson.    Chief Complaint/HPI:  This 57 year old male was admitted to hospitalist services secondary to left foot cellulitis.  Patient has also been evaluated and treated by Dr. Thompson of podiatry.  Patient has undergone irrigation and debridement of the  left foot.  Patient currently growing coagulase-negative staph.  Though this strain is predictably sensitive to penicillin and clindamycin, per medical literature vancomycin is the preferred drug of choice for osteomyelitis for this group.  All other antibiotics have been de-escalated.  Patient has no pain.  He is scheduled to return to the operating suite within 48 hours for another washout and further treatment.    Review of Systems   Constitutional: Negative for chills and fever.   Respiratory: Negative for shortness of breath.    Cardiovascular: Negative for chest pain.   Gastrointestinal: Negative for abdominal pain, nausea and vomiting.      All pertinent negatives and positives are as above. All other systems have been reviewed and are negative unless otherwise stated.     Objective    Temp:  [96.2 °F (35.7 °C)-99.4 °F (37.4 °C)] 97.2 °F (36.2 °C)  Heart Rate:  [68-74] 70  Resp:  [18-19] 18  BP: (116-120)/(61-86) 116/73    Physical Exam   Constitutional: He is oriented to person, place, and time. He appears well-developed and well-nourished. No distress.   HENT:   Head: Normocephalic and atraumatic.   Cardiovascular: Normal rate and regular rhythm.    Pulmonary/Chest: Effort normal and breath sounds normal. No respiratory distress. He has no wheezes.   Neurological: He is alert and oriented to person, place, and time.   Skin: Skin is dry. He is not diaphoretic.   Psychiatric: He has a normal mood and affect. His behavior is normal.     Results Review:  I have reviewed the labs, radiology results, and diagnostic studies.    Laboratory Data:     Results from last 7 days  Lab Units 03/11/18  0607 03/10/18  0559 03/09/18  0818   SODIUM mmol/L 139 141 139   POTASSIUM mmol/L 4.2 4.3 4.6   CHLORIDE mmol/L 101 103 101   CO2 mmol/L 27.0 25.0 27.0   BUN mg/dL 21 20 21   CREATININE mg/dL 0.99 0.91 0.86   GLUCOSE mg/dL 99 96 114*   CALCIUM mg/dL 9.0 8.6 9.1   BILIRUBIN mg/dL 0.2 0.2 0.3   ALK PHOS U/L 51 50 56   ALT  (SGPT) U/L 103* 129* 104*   AST (SGOT) U/L 54 87* 101*   ANION GAP mmol/L 11.0 13.0 11.0     Estimated Creatinine Clearance: 124 mL/min (by C-G formula based on SCr of 0.99 mg/dL).            Results from last 7 days  Lab Units 03/11/18  0607 03/10/18  0559 03/09/18  0818 03/08/18  0505 03/07/18  0328   WBC 10*3/mm3 6.17 5.96 5.08 5.28 4.68   HEMOGLOBIN g/dL 12.9* 13.2* 13.7 13.7 13.2*   HEMATOCRIT % 39.0 39.0 40.6 40.8 39.5   PLATELETS 10*3/mm3 176 174 197 179 156           Culture Data:   No results found for: BLOODCX  No results found for: URINECX  No results found for: RESPCX  No results found for: WOUNDCX  No results found for: STOOLCX  No components found for: BODYFLD    Radiology Data:   Imaging Results (last 24 hours)     ** No results found for the last 24 hours. **          I have reviewed the patient current medications.     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  Discharge planning.  Patient does live at home with his wife, however wife per patient is not agreeable or appropriate for home health teaching for IV antibiotic administration.                This document has been electronically signed by BRYAN Ocampo on March 11, 2018 10:14 AM

## 2018-03-11 NOTE — PLAN OF CARE
Problem: Patient Care Overview  Goal: Plan of Care Review  Outcome: Ongoing (interventions implemented as appropriate)   03/10/18 1503 03/11/18 0416   Coping/Psychosocial   Plan of Care Reviewed With --  patient   Plan of Care Review   Progress --  improving   OTHER   Outcome Summary pt vss. No complaints of pain this shift. --        Problem: Pain, Acute (Adult)  Goal: Acceptable Pain Control/Comfort Level  Outcome: Ongoing (interventions implemented as appropriate)

## 2018-03-12 ENCOUNTER — APPOINTMENT (OUTPATIENT)
Dept: INTERVENTIONAL RADIOLOGY/VASCULAR | Facility: HOSPITAL | Age: 58
End: 2018-03-12

## 2018-03-12 ENCOUNTER — APPOINTMENT (OUTPATIENT)
Dept: GENERAL RADIOLOGY | Facility: HOSPITAL | Age: 58
End: 2018-03-12

## 2018-03-12 ENCOUNTER — APPOINTMENT (OUTPATIENT)
Dept: ULTRASOUND IMAGING | Facility: HOSPITAL | Age: 58
End: 2018-03-12

## 2018-03-12 LAB
ALBUMIN SERPL-MCNC: 4.3 G/DL (ref 3.4–4.8)
ALBUMIN/GLOB SERPL: 1.2 G/DL (ref 1.1–1.8)
ALP SERPL-CCNC: 62 U/L (ref 38–126)
ALT SERPL W P-5'-P-CCNC: 102 U/L (ref 21–72)
ANION GAP SERPL CALCULATED.3IONS-SCNC: 13 MMOL/L (ref 5–15)
AST SERPL-CCNC: 43 U/L (ref 17–59)
BACTERIA SPEC AEROBE CULT: NORMAL
BASOPHILS # BLD AUTO: 0.04 10*3/MM3 (ref 0–0.2)
BASOPHILS NFR BLD AUTO: 0.5 % (ref 0–2)
BILIRUB SERPL-MCNC: 0.5 MG/DL (ref 0.2–1.3)
BUN BLD-MCNC: 23 MG/DL (ref 7–21)
BUN/CREAT SERPL: 25 (ref 7–25)
CALCIUM SPEC-SCNC: 9.7 MG/DL (ref 8.4–10.2)
CHLORIDE SERPL-SCNC: 98 MMOL/L (ref 95–110)
CO2 SERPL-SCNC: 28 MMOL/L (ref 22–31)
CREAT BLD-MCNC: 0.92 MG/DL (ref 0.7–1.3)
DEPRECATED RDW RBC AUTO: 41.1 FL (ref 35.1–43.9)
EOSINOPHIL # BLD AUTO: 0.14 10*3/MM3 (ref 0–0.7)
EOSINOPHIL NFR BLD AUTO: 1.9 % (ref 0–7)
ERYTHROCYTE [DISTWIDTH] IN BLOOD BY AUTOMATED COUNT: 12.9 % (ref 11.5–14.5)
GFR SERPL CREATININE-BSD FRML MDRD: 85 ML/MIN/1.73 (ref 60–130)
GLOBULIN UR ELPH-MCNC: 3.5 GM/DL (ref 2.3–3.5)
GLUCOSE BLD-MCNC: 114 MG/DL (ref 60–100)
GLUCOSE BLDC GLUCOMTR-MCNC: 108 MG/DL (ref 70–130)
GLUCOSE BLDC GLUCOMTR-MCNC: 114 MG/DL (ref 70–130)
GLUCOSE BLDC GLUCOMTR-MCNC: 114 MG/DL (ref 70–130)
GLUCOSE BLDC GLUCOMTR-MCNC: 86 MG/DL (ref 70–130)
GRAM STN SPEC: NORMAL
GRAM STN SPEC: NORMAL
HCT VFR BLD AUTO: 43.9 % (ref 39–49)
HGB BLD-MCNC: 14.7 G/DL (ref 13.7–17.3)
IMM GRANULOCYTES # BLD: 0.04 10*3/MM3 (ref 0–0.02)
IMM GRANULOCYTES NFR BLD: 0.5 % (ref 0–0.5)
LYMPHOCYTES # BLD AUTO: 1.97 10*3/MM3 (ref 0.6–4.2)
LYMPHOCYTES NFR BLD AUTO: 26.7 % (ref 10–50)
MCH RBC QN AUTO: 29.6 PG (ref 26.5–34)
MCHC RBC AUTO-ENTMCNC: 33.5 G/DL (ref 31.5–36.3)
MCV RBC AUTO: 88.3 FL (ref 80–98)
MONOCYTES # BLD AUTO: 0.88 10*3/MM3 (ref 0–0.9)
MONOCYTES NFR BLD AUTO: 11.9 % (ref 0–12)
NEUTROPHILS # BLD AUTO: 4.31 10*3/MM3 (ref 2–8.6)
NEUTROPHILS NFR BLD AUTO: 58.5 % (ref 37–80)
PLATELET # BLD AUTO: 203 10*3/MM3 (ref 150–450)
PMV BLD AUTO: 10.2 FL (ref 8–12)
POTASSIUM BLD-SCNC: 4.1 MMOL/L (ref 3.5–5.1)
PROT SERPL-MCNC: 7.8 G/DL (ref 6.3–8.6)
RBC # BLD AUTO: 4.97 10*6/MM3 (ref 4.37–5.74)
SODIUM BLD-SCNC: 139 MMOL/L (ref 137–145)
VANCOMYCIN TROUGH SERPL-MCNC: 16.53 MCG/ML (ref 10–15)
WBC NRBC COR # BLD: 7.38 10*3/MM3 (ref 3.2–9.8)

## 2018-03-12 PROCEDURE — 99024 POSTOP FOLLOW-UP VISIT: CPT | Performed by: PODIATRIST

## 2018-03-12 PROCEDURE — 80202 ASSAY OF VANCOMYCIN: CPT | Performed by: PODIATRIST

## 2018-03-12 PROCEDURE — 82962 GLUCOSE BLOOD TEST: CPT

## 2018-03-12 PROCEDURE — 80053 COMPREHEN METABOLIC PANEL: CPT | Performed by: NURSE PRACTITIONER

## 2018-03-12 PROCEDURE — 25010000002 VANCOMYCIN PER 500 MG: Performed by: PODIATRIST

## 2018-03-12 PROCEDURE — 02HV33Z INSERTION OF INFUSION DEVICE INTO SUPERIOR VENA CAVA, PERCUTANEOUS APPROACH: ICD-10-PCS | Performed by: PODIATRIST

## 2018-03-12 PROCEDURE — 76937 US GUIDE VASCULAR ACCESS: CPT

## 2018-03-12 PROCEDURE — C1751 CATH, INF, PER/CENT/MIDLINE: HCPCS

## 2018-03-12 PROCEDURE — 85025 COMPLETE CBC W/AUTO DIFF WBC: CPT | Performed by: NURSE PRACTITIONER

## 2018-03-12 PROCEDURE — B548ZZA ULTRASONOGRAPHY OF SUPERIOR VENA CAVA, GUIDANCE: ICD-10-PCS | Performed by: PODIATRIST

## 2018-03-12 RX ADMIN — VALSARTAN 40 MG: 40 TABLET, FILM COATED ORAL at 21:15

## 2018-03-12 RX ADMIN — VITAMIN E CAP 100 UNIT 200 UNITS: 100 CAP at 21:14

## 2018-03-12 RX ADMIN — DRONEDARONE 400 MG: 400 TABLET, FILM COATED ORAL at 21:15

## 2018-03-12 RX ADMIN — GLIPIZIDE 5 MG: 5 TABLET ORAL at 08:28

## 2018-03-12 RX ADMIN — MAGNESIUM OXIDE TAB 400 MG (241.3 MG ELEMENTAL MG) 400 MG: 400 (241.3 MG) TAB at 21:15

## 2018-03-12 RX ADMIN — HYDROCORTISONE: 1 CREAM TOPICAL at 00:01

## 2018-03-12 RX ADMIN — HYDROCORTISONE 1 EACH: 1 CREAM TOPICAL at 21:15

## 2018-03-12 RX ADMIN — OXYCODONE HYDROCHLORIDE AND ACETAMINOPHEN 500 MG: 500 TABLET ORAL at 21:15

## 2018-03-12 RX ADMIN — HYDROCORTISONE: 1 CREAM TOPICAL at 08:28

## 2018-03-12 RX ADMIN — ACETAMINOPHEN 650 MG: 325 TABLET ORAL at 06:04

## 2018-03-12 RX ADMIN — METOPROLOL TARTRATE 12.5 MG: 25 TABLET, FILM COATED ORAL at 21:14

## 2018-03-12 RX ADMIN — MAGNESIUM OXIDE TAB 400 MG (241.3 MG ELEMENTAL MG) 400 MG: 400 (241.3 MG) TAB at 08:28

## 2018-03-12 RX ADMIN — VANCOMYCIN HYDROCHLORIDE 1750 MG: 5 INJECTION, POWDER, LYOPHILIZED, FOR SOLUTION INTRAVENOUS at 10:30

## 2018-03-12 RX ADMIN — ACETAMINOPHEN 650 MG: 325 TABLET ORAL at 15:27

## 2018-03-12 RX ADMIN — VANCOMYCIN HYDROCHLORIDE 1750 MG: 5 INJECTION, POWDER, LYOPHILIZED, FOR SOLUTION INTRAVENOUS at 21:14

## 2018-03-12 NOTE — DISCHARGE PLACEMENT REQUEST
"Amee Lisa (57 y.o. Male)     Date of Birth Social Security Number Address Home Phone MRN    1960  [id number]  [place] [address] [phone] [id number]    Quaker Marital Status          Mormon        Admission Date Admission Type Admitting Provider Attending Provider Department, Room/Bed    3/5/18 Urgent BennyendPerez aparicio MD Echendu, Anthony W, MD [address], [address]    Discharge Date Discharge Disposition Discharge Destination                       Attending Provider:  Perez Alexandra MD    Allergies:  Penicillins, Januvia [Sitagliptin], Lipitor [Atorvastatin], Peanut Oil, Peanut-containing Drug Products, Sulfa Antibiotics    Isolation:  None   Infection:  None   Code Status:  FULL    Ht:  193 cm (75.98\")   Wt:  131 kg (288 lb)    Admission Cmt:  None   Principal Problem:  None                Active Insurance as of 3/5/2018     Primary Coverage     Payor Plan Insurance Group Employer/Plan Group    MEDICARE MEDICARE A & B      Payor Plan Address Payor Plan Phone Number Effective From Effective To    [address] [phone] 12/1/2012     [place] [address]       Subscriber Name Subscriber Birth Date Member ID       AMEE LISA 1960 [id number]           Secondary Coverage     Payor Plan Insurance Group Employer/Plan Group     BENEFIT PLAN  BENEFIT PLAN [id number]     Payor Plan Address Payor Plan Phone Number Effective From Effective To    [address] [phone] 1/1/2017     [place] [address]       Subscriber Name Subscriber Birth Date Member ID       AMEE LISA 1960 [id number]                 Emergency Contacts      (Rel.) Home Phone Work Phone Mobile Phone    Reymundo Lisa (Spouse) [phone] -- [phone]               History & Physical      Marco A Thompson DPM at 3/7/2018 12:19 PM          H&P reviewed. The patient was examined and there are no changes to the H&P.  "            This document has been electronically signed by Marco A Thompson DPM on March 7, 2018 12:19 PM           Electronically signed by Marco A Thompson DPM at 3/7/2018 12:19 PM   Source Note              LOS: 1 day   Patient Care Team:  Jamaal Bravo MD as PCP - General  Marco A Thompson DPM as Consulting Physician (Podiatry)    Chief Complaint: left foot infection    Subjective   Patient seen at bedside resting comfortably.  No acute distress.  Denies pain.    History of Present Illness    Subjective    History taken from: patient    Objective     Vital Signs  Temp:  [96.7 °F (35.9 °C)-99.6 °F (37.6 °C)] 99.6 °F (37.6 °C)  Heart Rate:  [70-80] 73  Resp:  [16-18] 18  BP: (112-143)/(63-83) 141/83    Objective     Constitutional: well developed, well nourished    HEENT: Normocephalic and atraumatic, normal hearing    Respiratory: Non labored respirations noted    LLE: Dressing is clean, dry and intact.  Upon removal there is decreased erythema noted.  Decreased edema noted.  Well approximated incisions noted to the dorsal fourth, third and second digits.  Hallux is packed open with 2 retention sutures.  No purulent drainage noted.    Psychiatric: A&O x 3 with normal mood and affect. NAD.     Results Review:     I reviewed the patient's new clinical results.      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 Risks and benefits discussed in detail. No guarantees given or implied.  NPO after midnight.   Please do not hesitate to call with questions.     Marco A Thompson DPM  03/06/18  6:22 PM         Electronically signed by Marco A Thompson DPM at 3/6/2018  6:26 PM             Marco A Thompson DPM at 3/6/2018  6:22 PM           LOS: 1 day   Patient Care Team:  Jamaal Bravo MD as PCP -  General  Marco A Thompson DPM as Consulting Physician (Podiatry)    Chief Complaint: left foot infection    Subjective   Patient seen at bedside resting comfortably.  No acute distress.  Denies pain.    History of Present Illness    Subjective    History taken from: patient    Objective     Vital Signs  Temp:  [96.7 °F (35.9 °C)-99.6 °F (37.6 °C)] 99.6 °F (37.6 °C)  Heart Rate:  [70-80] 73  Resp:  [16-18] 18  BP: (112-143)/(63-83) 141/83    Objective     Constitutional: well developed, well nourished    HEENT: Normocephalic and atraumatic, normal hearing    Respiratory: Non labored respirations noted    LLE: Dressing is clean, dry and intact.  Upon removal there is decreased erythema noted.  Decreased edema noted.  Well approximated incisions noted to the dorsal fourth, third and second digits.  Hallux is packed open with 2 retention sutures.  No purulent drainage noted.    Psychiatric: A&O x 3 with normal mood and affect. NAD.     Results Review:     I reviewed the patient's new clinical results.      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 Risks and benefits discussed in detail. No guarantees given or implied.  NPO after midnight.   Please do not hesitate to call with questions.     Marco A Thompson DPM  03/06/18  6:22 PM        Electronically signed by Marco A Thompson DPM at 3/6/2018  6:26 PM     WALTER Romero at 3/5/2018  2:21 PM     Attestation signed by Perez Alexandra MD at 3/5/2018  3:49 PM    I personally evaluated and examined the patient in conjunction with WALTER Easton and agree with the assessment, treatment plan, and disposition of the patient as recorded.  Cardiovascular: Normal S1 and S2.  Lungs: Clear                        [facility]  Hospital Medicine Admission      Date of Admission: 3/5/2018      Primary Care Physician: Jamaal Bravo MD      Chief Complaint:  Surgery on left foot    HPI: This is a 57-year-old male with PMH of arthritis, atrial fibrillation, diabetes mellitus type 2, diabetic foot ulcers, HTN and hallux malleus of the left foot that presents to [facility] as a direct admission from podiatrist, Dr. Donna Strickland, for I&D and hardware removal of the left foot today. The patient underwent hallux IPJ fusion and hammertoe correction of digits 2 through 4 of the left foot on February 15, 2018 by Dr. Thompson.  He states over the last 2 days he's had increasing pain and drainage to the surgical area.  The patient was started on Omnicef 300 mg by mouth twice a day on 3/4/2018 by his PCP. 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  The patient states his father underwent the same procedures and ended up with amputation of both feet.    Concurrent Medical History:  has a past medical history of Arthritis; Atrial fibrillation; Diabetes mellitus; Diabetic foot ulcer associated with type 2 diabetes mellitus; Hallux malleus of left foot; Hammer toe; Hypertension; MI (myocardial infarction); Neuropathy in diabetes; and Onychomycosis.    Past Surgical History:  has a past surgical history that includes Toe amputation (Right, 2016); Carpal tunnel release (2015); Tonsilectomy, adenoidectomy, bilateral myringotomy and tubes; Foot Wound Closure (Right, 3/2/2017); Finger amputation (Right, 3/5/2017); Cardiac defibrillator placement (2010, 2016); Foot surgery; and Hammer Toe Repair (Left, 2/15/2018).    Family History: family history includes Diabetes in his father; No Known Problems in his daughter, daughter, maternal grandfather, maternal grandmother, paternal grandfather, paternal grandmother, and son; Stroke in his father.    Social History:  reports that he has never smoked. He  has never used smokeless tobacco. He reports that he does not drink alcohol or use illicit drugs.    Allergies:   Allergies   Allergen Reactions   • Penicillins Anaphylaxis   • Januvia [Sitagliptin] Hives   • Lipitor [Atorvastatin] Other (See Comments)     Muscle Cramps...   • Peanut Oil Rash   • Peanut-Containing Drug Products Rash   • Sulfa Antibiotics Rash       Medications:   Prior to Admission medications    Medication Sig Start Date End Date Taking? Authorizing Provider   aspirin 81 MG chewable tablet Chew 81 mg Every Night.    Historical Provider, MD   Cholecalciferol (VITAMIN D3) 5000 UNITS capsule capsule Take 5,000 Units by mouth Every Night.    Historical Provider, MD   Cyanocobalamin (VITAMIN B 12 PO) Take 1,000 mcg by mouth Every Night.    Historical Provider, MD   dronedarone (MULTAQ) 400 MG tablet Take 400 mg by mouth Every Night.    Historical Provider, MD   fluticasone (FLONASE) 50 MCG/ACT nasal spray 2 sprays into each nostril As Needed for Rhinitis.    Historical Provider, MD   glimepiride (AMARYL) 2 MG tablet Take 4 mg by mouth 2 (Two) Times a Day. 10/27/16   Historical Provider, MD   HYDROcodone-acetaminophen (NORCO) 7.5-325 MG per tablet Take 1 tablet by mouth Every 6 (Six) Hours As Needed for Moderate Pain . 2/15/18   Marco A Thompson DPM   magnesium oxide (MAGOX) 400 (241.3 Mg) MG tablet tablet Take 400 mg by mouth 2 (Two) Times a Day.    Historical Provider, MD   metFORMIN (GLUCOPHAGE) 1000 MG tablet Take 1,000 mg by mouth 2 (Two) Times a Day With Meals. Patient states he only uses when needed    Historical Provider, MD   metoprolol tartrate (LOPRESSOR) 25 MG tablet Take 12.5 mg by mouth Every Night.    Historical Provider, MD   ONE TOUCH ULTRA TEST test strip USE TO TEST BLOOD SUGAR THREE TIMES A DAY 8/20/16   Historical Provider, MD   valsartan (DIOVAN) 160 MG tablet Take 40 mg by mouth Every Night. 1/2 tablet daily     Historical Provider, MD   vitamin C (ASCORBIC ACID) 500 MG tablet Take  500 mg by mouth Every Night.    Historical Provider, MD   vitamin E 200 UNIT capsule Take 200 Units by mouth Every Night.    Historical Provider, MD       Review of Systems:  Review of Systems   All other systems reviewed and are negative.     Otherwise complete ROS is negative except as mentioned above.    Physical Exam:   Temp:  [98 °F (36.7 °C)] 98 °F (36.7 °C)  Heart Rate:  [80-91] 80  Resp:  [18] 18  BP: (135)/(77) 135/77  Physical Exam   Constitutional: He is oriented to person, place, and time. He appears well-developed and well-nourished.   HENT:   Head: Normocephalic and atraumatic.   Eyes: EOM are normal. Pupils are equal, round, and reactive to light.   Neck: Normal range of motion. Neck supple.   Cardiovascular: Normal rate and regular rhythm.    Pulmonary/Chest: Effort normal and breath sounds normal.   Abdominal: Soft. Bowel sounds are normal.   Musculoskeletal: Normal range of motion.   Neurological: He is alert and oriented to person, place, and time.   Skin:   Left lower extremity shows mild erythema and edema.    Psychiatric: He has a normal mood and affect.     Results Reviewed:  I have personally reviewed current lab, radiology, and data and agree with results.  Lab Results (last 24 hours)     ** No results found for the last 24 hours. **        Imaging Results (last 24 hours)     ** No results found for the last 24 hours. 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           Plan:  1.  Cellulitis, left foot:  Surgery planned today with Dr. Thompson for I&D and hardware removal of the left foot.  Ultrasound of the LLE is  ordered. Wound culture pending.   Vancomycin, aztreonam and flagyl ordered for triple coverage.     2.  History of atrial fibrillation:  Will order continuous telemetry to monitor.  Continue Multaq.   3.  Diabetes mellitus, type II:  Continue glipizide and metformin.  SSI initiated.      I discussed the patients findings and my recommendations with: Patient.       This document has been electronically signed by WALTER Romero on March 5, 2018 2:22 PM                      Electronically signed by Perez Alexandra MD at 3/5/2018  3:49 PM       Prior to Admission Medications     Prescriptions Last Dose Informant Patient Reported? Taking?    aspirin 81 MG chewable tablet   Yes No    Chew 81 mg Every Night.    Cholecalciferol (VITAMIN D3) 5000 UNITS capsule capsule   Yes No    Take 5,000 Units by mouth Every Night.    Cyanocobalamin (VITAMIN B 12 PO)   Yes No    Take 1,000 mcg by mouth Every Night.    dronedarone (MULTAQ) 400 MG tablet   Yes No    Take 400 mg by mouth Every Night.    fluticasone (FLONASE) 50 MCG/ACT nasal spray   Yes No    2 sprays into each nostril As Needed for Rhinitis.    glimepiride (AMARYL) 2 MG tablet   Yes No    Take 4 mg by mouth 2 (Two) Times a Day.    HYDROcodone-acetaminophen (NORCO) 7.5-325 MG per tablet   No No    Take 1 tablet by mouth Every 6 (Six) Hours As Needed for Moderate Pain .    magnesium oxide (MAGOX) 400 (241.3 Mg) MG tablet tablet   Yes No    Take 400 mg by mouth 2 (Two) Times a Day.    metFORMIN (GLUCOPHAGE) 1000 MG tablet   Yes No    Take 1,000 mg by mouth 2 (Two) Times a Day With Meals. Patient states he only uses when needed    metoprolol tartrate (LOPRESSOR) 25 MG tablet   Yes No    Take 12.5 mg by mouth Every Night.    ONE TOUCH ULTRA TEST test strip   Yes No    USE TO TEST BLOOD SUGAR THREE TIMES A DAY    valsartan (DIOVAN) 160 MG tablet   Yes No    Take 40 mg by mouth Every Night. 1/2 tablet daily     vitamin C (ASCORBIC ACID) 500 MG tablet   Yes No    Take 500  mg by mouth Every Night.    vitamin E 200 UNIT capsule   Yes No    Take 200 Units by mouth Every Night.          Hospital Medications (active)       Dose Frequency Start End    acetaminophen (TYLENOL) tablet 650 mg 650 mg Every 4 Hours PRN 3/5/2018     Sig - Route: Take 2 tablets by mouth Every 4 (Four) Hours As Needed for Mild Pain . - Oral    dextrose (D50W) solution 25 g 25 g Every 15 Minutes PRN 3/5/2018     Sig - Route: Infuse 50 mL into a venous catheter Every 15 (Fifteen) Minutes As Needed for Low Blood Sugar (Blood Sugar Less Than 70, Patient Has IV Access - Unresponsive, NPO or Unable To Safely Swallow). - Intravenous    dextrose (GLUTOSE) oral gel 15 g 15 g Every 15 Minutes PRN 3/5/2018     Sig - Route: Take 15 g by mouth Every 15 (Fifteen) Minutes As Needed for Low Blood Sugar (Blood Sugar Less Than 70, Patient Alert, Is Not NPO & Can Safely Swallow). - Oral    dronedarone (MULTAQ) tablet 400 mg 400 mg Nightly 3/5/2018     Sig - Route: Take 1 tablet by mouth Every Night. - Oral    fluticasone (FLONASE) 50 MCG/ACT nasal spray 2 spray 2 spray As Needed 3/5/2018     Sig - Route: 2 sprays into each nostril As Needed for Rhinitis. - Nasal    glipiZIDE (GLUCOTROL) tablet 5 mg 5 mg Every Morning Before Breakfast 3/6/2018     Sig - Route: Take 1 tablet by mouth Every Morning Before Breakfast. - Oral    glucagon (human recombinant) (GLUCAGEN DIAGNOSTIC) injection 1 mg 1 mg Every 15 Minutes PRN 3/5/2018     Sig - Route: Inject 1 mg under the skin Every 15 (Fifteen) Minutes As Needed (Blood Glucose Less Than 70 - Patient Without IV Access - Unresponsive, NPO or Unable To Safely Swallow). - Subcutaneous    HYDROcodone-acetaminophen (NORCO) 7.5-325 MG per tablet 1 tablet 1 tablet Every 6 Hours PRN 3/5/2018 3/15/2018    Sig - Route: Take 1 tablet by mouth Every 6 (Six) Hours As Needed for Moderate Pain . - Oral    insulin aspart (novoLOG) injection 0-9 Units 0-9 Units 4 Times Daily Before Meals & Nightly 3/5/2018      "Sig - Route: Inject 0-9 Units under the skin 4 (Four) Times a Day Before Meals & at Bedtime. - Subcutaneous    magic butt ointment  2 Times Daily 3/9/2018     Sig - Route: Apply  topically 2 (Two) Times a Day. - Topical    Cosign for Ordering: Accepted by Caio Hall MD on 3/9/2018 11:12 AM    magnesium oxide (MAGOX) tablet 400 mg 400 mg 2 Times Daily 3/5/2018     Sig - Route: Take 1 tablet by mouth 2 (Two) Times a Day. - Oral    metoprolol tartrate (LOPRESSOR) half tablet 12.5 mg 12.5 mg Nightly 3/5/2018     Sig - Route: Take 1 half tablet by mouth Every Night. - Oral    ondansetron (ZOFRAN) injection 4 mg 4 mg Every 6 Hours PRN 3/5/2018     Sig - Route: Infuse 2 mL into a venous catheter Every 6 (Six) Hours As Needed for Nausea or Vomiting. - Intravenous    Pharmacy to dose vancomycin  Continuous PRN 3/5/2018 4/15/2018    Sig - Route: Continuous As Needed for Consult (pharmacy to dose vancomycin begining with second dose). - Does not apply    Linked Group 1:  \"And\" Linked Group Details        sodium chloride 0.9 % flush 1-10 mL 1-10 mL As Needed 3/5/2018     Sig - Route: Infuse 1-10 mL into a venous catheter As Needed for Line Care. - Intravenous    valsartan (DIOVAN) tablet 40 mg 40 mg Nightly 3/5/2018     Sig - Route: Take 1 tablet by mouth Every Night. - Oral    vancomycin (VANCOCIN) 1,750 mg in sodium chloride 0.9 % 500 mL IVPB 1,750 mg Every 12 Hours 3/10/2018 4/15/2018    Sig - Route: Infuse 1,750 mg into a venous catheter Every 12 (Twelve) Hours. - Intravenous    vitamin C (ASCORBIC ACID) tablet 500 mg 500 mg Nightly 3/5/2018     Sig - Route: Take 1 tablet by mouth Every Night. - Oral    vitamin E capsule 200 Units 200 Units Nightly 3/5/2018     Sig - Route: Take 2 capsules by mouth Every Night. - Oral          Lab Results (last 7 days)     Procedure Component Value Units Date/Time    Vancomycin, Trough [[id number]]  (Abnormal) Collected:  03/12/18 0916    Specimen:  Blood Updated:  03/12/18 0952     " Vancomycin Trough 16.53 (H) mcg/mL     POC Glucose Once [[id number]]  (Normal) Collected:  03/12/18 0728    Specimen:  Blood Updated:  03/12/18 0746     Glucose 108 mg/dL      Comment: Meter: [id number]: [id number] FE WARE       Comprehensive Metabolic Panel [[id number]]  (Abnormal) Collected:  03/12/18 0548    Specimen:  Blood Updated:  03/12/18 0649     Glucose 114 (H) mg/dL      BUN 23 (H) mg/dL      Creatinine 0.92 mg/dL      Sodium 139 mmol/L      Potassium 4.1 mmol/L      Chloride 98 mmol/L      CO2 28.0 mmol/L      Calcium 9.7 mg/dL      Total Protein 7.8 g/dL      Albumin 4.30 g/dL      ALT (SGPT) 102 (H) U/L      AST (SGOT) 43 U/L      Alkaline Phosphatase 62 U/L      Total Bilirubin 0.5 mg/dL      eGFR Non African Amer 85 mL/min/1.73      Globulin 3.5 gm/dL      A/G Ratio 1.2 g/dL      BUN/Creatinine Ratio 25.0     Anion Gap 13.0 mmol/L     CBC & Differential [[id number]] Collected:  03/12/18 0548    Specimen:  Blood Updated:  03/12/18 0615    Narrative:       The following orders were created for panel order CBC & Differential.  Procedure                               Abnormality         Status                     ---------                               -----------         ------                     CBC Auto Differential[[id number]]        Abnormal            Final result                 Please view results for these tests on the individual orders.    CBC Auto Differential [[id number]]  (Abnormal) Collected:  03/12/18 0548    Specimen:  Blood Updated:  03/12/18 0615     WBC 7.38 10*3/mm3      RBC 4.97 10*6/mm3      Hemoglobin 14.7 g/dL      Hematocrit 43.9 %      MCV 88.3 fL      MCH 29.6 pg      MCHC 33.5 g/dL      RDW 12.9 %      RDW-SD 41.1 fl      MPV 10.2 fL      Platelets 203 10*3/mm3      Neutrophil % 58.5 %      Lymphocyte % 26.7 %      Monocyte % 11.9 %      Eosinophil % 1.9 %      Basophil % 0.5 %      Immature Grans % 0.5 %      Neutrophils, Absolute 4.31 10*3/mm3      Lymphocytes,  Absolute 1.97 10*3/mm3      Monocytes, Absolute 0.88 10*3/mm3      Eosinophils, Absolute 0.14 10*3/mm3      Basophils, Absolute 0.04 10*3/mm3      Immature Grans, Absolute 0.04 (H) 10*3/mm3     Tissue / Bone Culture - Tissue, Toe, Left [[id number]]  (Normal) Collected:  03/07/18 1313    Specimen:  Tissue from Toe, Left Updated:  03/12/18 0606     Tissue Culture No growth at 5 days     Gram Stain Result Rare (1+) WBCs seen      No organisms seen    Narrative:       NO ANAEROBES ISOLATED    POC Glucose Once [[id number]]  (Normal) Collected:  03/11/18 1949    Specimen:  Blood Updated:  03/11/18 2047     Glucose 107 mg/dL      Comment: Meter: [id number]: [id number] NCLC       POC Glucose Once [[id number]]  (Normal) Collected:  03/11/18 1630    Specimen:  Blood Updated:  03/11/18 1653     Glucose 88 mg/dL      Comment: Meter: [id number]: [id number] [employer]       POC Glucose Once [[id number]]  (Normal) Collected:  03/11/18 1035    Specimen:  Blood Updated:  03/11/18 1113     Glucose 113 mg/dL      Comment: RN NotifiedMeter: [id number]: [id number] [employer]       POC Glucose Once [[id number]]  (Normal) Collected:  03/11/18 0719    Specimen:  Blood Updated:  03/11/18 0736     Glucose 122 mg/dL      Comment: RN NotifiedMeter: [id number]: [id number] [employer]       Comprehensive Metabolic Panel [[id number]]  (Abnormal) Collected:  03/11/18 0607    Specimen:  Blood Updated:  03/11/18 0711     Glucose 99 mg/dL      BUN 21 mg/dL      Creatinine 0.99 mg/dL      Sodium 139 mmol/L      Potassium 4.2 mmol/L      Chloride 101 mmol/L      CO2 27.0 mmol/L      Calcium 9.0 mg/dL      Total Protein 6.9 g/dL      Albumin 3.50 g/dL      ALT (SGPT) 103 (H) U/L      AST (SGOT) 54 U/L      Alkaline Phosphatase 51 U/L      Total Bilirubin 0.2 mg/dL      eGFR Non African Amer 78 mL/min/1.73      Globulin 3.4 gm/dL      A/G Ratio 1.0 (L) g/dL      BUN/Creatinine Ratio 21.2     Anion  Gap 11.0 mmol/L     CBC & Differential [[id number]] Collected:  03/11/18 0607    Specimen:  Blood Updated:  03/11/18 0701    Narrative:       The following orders were created for panel order CBC & Differential.  Procedure                               Abnormality         Status                     ---------                               -----------         ------                     CBC Auto Differential[[id number]]        Abnormal            Final result                 Please view results for these tests on the individual orders.    CBC Auto Differential [[id number]]  (Abnormal) Collected:  03/11/18 0607    Specimen:  Blood Updated:  03/11/18 0701     WBC 6.17 10*3/mm3      RBC 4.44 10*6/mm3      Hemoglobin 12.9 (L) g/dL      Hematocrit 39.0 %      MCV 87.8 fL      MCH 29.1 pg      MCHC 33.1 g/dL      RDW 12.7 %      RDW-SD 40.6 fl      MPV 10.1 fL      Platelets 176 10*3/mm3      Neutrophil % 56.7 %      Lymphocyte % 30.0 %      Monocyte % 9.7 %      Eosinophil % 2.6 %      Basophil % 0.5 %      Immature Grans % 0.5 %      Neutrophils, Absolute 3.50 10*3/mm3      Lymphocytes, Absolute 1.85 10*3/mm3      Monocytes, Absolute 0.60 10*3/mm3      Eosinophils, Absolute 0.16 10*3/mm3      Basophils, Absolute 0.03 10*3/mm3      Immature Grans, Absolute 0.03 (H) 10*3/mm3     Tissue / Bone Culture - Tissue, Toe, Left [[id number]]  (Abnormal)  (Susceptibility) Collected:  03/07/18 1313    Specimen:  Tissue from Toe, Left Updated:  03/11/18 0631     Tissue Culture --      Light growth (2+) Staphylococcus intermedius (A)      Light growth (2+) Staphylococcus warneri (A)     Comment: different colony type          Gram Stain Result Rare (1+) WBCs seen      No organisms seen    Narrative:       Slide reviewed confirmed  NO ANAEROBES ISOLATED    Susceptibility      Staphylococcus intermedius    Staphylococcus warneri       MARTINA    MARTINA       Clindamycin >4 ug/ml Resistant    <=0.5 ug/ml Susceptible       Erythromycin >4 ug/ml  Resistant    4 ug/ml Intermediate       Gentamicin <=4 ug/ml Susceptible    <=4 ug/ml Susceptible       Oxacillin 1 ug/ml Resistant    0.5 ug/ml Resistant       Penicillin G 2 ug/ml Resistant    2 ug/ml Resistant       Tetracycline <=4 ug/ml Susceptible    <=4 ug/ml Susceptible       Trimethoprim + Sulfamethoxazole <=0.5/9.5 ug/ml Susceptible    <=0.5/9.5 ug/ml Susceptible       Vancomycin 0.5 ug/ml Susceptible    1 ug/ml Susceptible                      POC Glucose Once [[id number]]  (Normal) Collected:  03/10/18 2103    Specimen:  Blood Updated:  03/10/18 2124     Glucose 109 mg/dL      Comment: RN NotifiedMeter: [id number]: [id number] SUKHDEV PENNINGTON       POC Glucose Once [[id number]]  (Normal) Collected:  03/10/18 1645    Specimen:  Blood Updated:  03/10/18 1701     Glucose 80 mg/dL      Comment: Meter: [id number]: [id number] WILVER SALGADO       Vancomycin, Trough [[id number]]  (Abnormal) Collected:  03/10/18 1122    Specimen:  Blood Updated:  03/10/18 1213     Vancomycin Trough 21.66 (H) mcg/mL     POC Glucose Once [[id number]]  (Normal) Collected:  03/10/18 1040    Specimen:  Blood Updated:  03/10/18 1056     Glucose 115 mg/dL      Comment: RN NotifiedMeter: [id number]: [id number] WILVER SALGADO       CBC & Differential [[id number]] Collected:  03/10/18 0559    Specimen:  Blood Updated:  03/10/18 0727    Narrative:       The following orders were created for panel order CBC & Differential.  Procedure                               Abnormality         Status                     ---------                               -----------         ------                     CBC Auto Differential[[id number]]        Abnormal            Final result                 Please view results for these tests on the individual orders.    CBC Auto Differential [[id number]]  (Abnormal) Collected:  03/10/18 0559    Specimen:  Blood Updated:  03/10/18 0727     WBC 5.96 10*3/mm3      RBC 4.44 10*6/mm3      Hemoglobin  13.2 (L) g/dL      Hematocrit 39.0 %      MCV 87.8 fL      MCH 29.7 pg      MCHC 33.8 g/dL      RDW 12.7 %      RDW-SD 40.8 fl      MPV 10.0 fL      Platelets 174 10*3/mm3      Neutrophil % 55.4 %      Lymphocyte % 30.0 %      Monocyte % 10.2 %      Eosinophil % 3.4 %      Basophil % 0.5 %      Immature Grans % 0.5 %      Neutrophils, Absolute 3.30 10*3/mm3      Lymphocytes, Absolute 1.79 10*3/mm3      Monocytes, Absolute 0.61 10*3/mm3      Eosinophils, Absolute 0.20 10*3/mm3      Basophils, Absolute 0.03 10*3/mm3      Immature Grans, Absolute 0.03 (H) 10*3/mm3      nRBC 0.0 /100 WBC     Comprehensive Metabolic Panel [[id number]]  (Abnormal) Collected:  03/10/18 0559    Specimen:  Blood Updated:  03/10/18 0724     Glucose 96 mg/dL      BUN 20 mg/dL      Creatinine 0.91 mg/dL      Sodium 141 mmol/L      Potassium 4.3 mmol/L      Chloride 103 mmol/L      CO2 25.0 mmol/L      Calcium 8.6 mg/dL      Total Protein 6.6 g/dL      Albumin 3.40 g/dL      ALT (SGPT) 129 (H) U/L      AST (SGOT) 87 (H) U/L      Alkaline Phosphatase 50 U/L      Total Bilirubin 0.2 mg/dL      eGFR Non African Amer 86 mL/min/1.73      Globulin 3.2 gm/dL      A/G Ratio 1.1 g/dL      BUN/Creatinine Ratio 22.0     Anion Gap 13.0 mmol/L     POC Glucose Once [[id number]]  (Normal) Collected:  03/09/18 2114    Specimen:  Blood Updated:  03/09/18 2143     Glucose 108 mg/dL      Comment: Meter: [id number]: [id number] SUKHDEV ADITI       POC Glucose Once [[id number]]  (Normal) Collected:  03/09/18 1644    Specimen:  Blood Updated:  03/09/18 1703     Glucose 84 mg/dL      Comment: Meter: [id number]: [id number] WILVER SALGADO       POC Glucose Once [[id number]]  (Normal) Collected:  03/09/18 1102    Specimen:  Blood Updated:  03/09/18 1118     Glucose 84 mg/dL      Comment: Meter: [id number]: [id number] WILVER SALGADO       Tissue / Bone Culture - Bone, Foot, Left [[id number]]  (Abnormal)  (Susceptibility) Collected:  03/05/18 3557     Specimen:  Bone from Foot, Left Updated:  03/09/18 1001     Tissue Culture --      Moderate growth (3+) Staphylococcus lugdunensis (A)     Comment: Corrected result. Previously Reported Organism Changed. Previous result was Staphylococcus, coagulase negative on 3/7/2018 at 0839 CST.        Gram Stain Result Few (2+) WBCs seen      Few (2+) Gram positive cocci in pairs    Narrative:       no anaerobes isolated    Susceptibility      Staphylococcus lugdunensis     MARTINA     Clindamycin >4 ug/ml Resistant     Levofloxacin <=1 ug/ml Susceptible  [1]      Oxacillin 1 ug/ml Susceptible     Penicillin G 0.12 ug/ml Susceptible     Tetracycline <=4 ug/ml Susceptible     Trimethoprim + Sulfamethoxazole <=0.5/9.5 ug/ml Susceptible     Vancomycin 1 ug/ml Susceptible            [1]   Staphylococcus species may develop resistance during prolonged therapy with quinolones.  Isolates that are initially susceptible may become resistant within three to four days after initiation of therapy. Testing of repeat isolates may be warranted.                 Comprehensive Metabolic Panel [[id number]]  (Abnormal) Collected:  03/09/18 0818    Specimen:  Blood Updated:  03/09/18 0900     Glucose 114 (H) mg/dL      BUN 21 mg/dL      Creatinine 0.86 mg/dL      Sodium 139 mmol/L      Potassium 4.6 mmol/L      Chloride 101 mmol/L      CO2 27.0 mmol/L      Calcium 9.1 mg/dL      Total Protein 7.4 g/dL      Albumin 3.80 g/dL      ALT (SGPT) 104 (H) U/L      AST (SGOT) 101 (H) U/L      Alkaline Phosphatase 56 U/L      Total Bilirubin 0.3 mg/dL      eGFR Non African Amer 92 mL/min/1.73      Globulin 3.6 (H) gm/dL      A/G Ratio 1.1 g/dL      BUN/Creatinine Ratio 24.4     Anion Gap 11.0 mmol/L     CBC & Differential [[id number]] Collected:  03/09/18 0818    Specimen:  Blood Updated:  03/09/18 0839    Narrative:       The following orders were created for panel order CBC & Differential.  Procedure                               Abnormality         Status                      ---------                               -----------         ------                     CBC Auto Differential[[id number]]        Normal              Final result                 Please view results for these tests on the individual orders.    CBC Auto Differential [[id number]]  (Normal) Collected:  03/09/18 0818    Specimen:  Blood Updated:  03/09/18 0839     WBC 5.08 10*3/mm3      RBC 4.59 10*6/mm3      Hemoglobin 13.7 g/dL      Hematocrit 40.6 %      MCV 88.5 fL      MCH 29.8 pg      MCHC 33.7 g/dL      RDW 12.8 %      RDW-SD 41.1 fl      MPV 9.5 fL      Platelets 197 10*3/mm3      Neutrophil % 58.4 %      Lymphocyte % 27.2 %      Monocyte % 10.6 %      Eosinophil % 2.8 %      Basophil % 0.6 %      Immature Grans % 0.4 %      Neutrophils, Absolute 2.97 10*3/mm3      Lymphocytes, Absolute 1.38 10*3/mm3      Monocytes, Absolute 0.54 10*3/mm3      Eosinophils, Absolute 0.14 10*3/mm3      Basophils, Absolute 0.03 10*3/mm3      Immature Grans, Absolute 0.02 10*3/mm3      nRBC 0.0 /100 WBC     POC Glucose Once [[id number]]  (Abnormal) Collected:  03/09/18 0726    Specimen:  Blood Updated:  03/09/18 0745     Glucose 140 (H) mg/dL      Comment: RN NotifiedMeter: [id number]: [id number] WILVER SALGADO       POC Glucose Once [[id number]]  (Normal) Collected:  03/09/18 0355    Specimen:  Blood Updated:  03/09/18 0408     Glucose 107 mg/dL      Comment: Meter: [id number]: [id number] PACE LANETTE       POC Glucose Once [[id number]]  (Normal) Collected:  03/08/18 2039    Specimen:  Blood Updated:  03/08/18 2144     Glucose 87 mg/dL      Comment: Meter: [id number]: [id number] [facility]       POC Glucose Once [[id number]]  (Normal) Collected:  03/08/18 1639    Specimen:  Blood Updated:  03/08/18 1657     Glucose 88 mg/dL      Comment: RN NotifiedMeter: [id number]: [id number] WILVER SALGADO       POC Glucose Once [[id number]]  (Abnormal) Collected:  03/08/18 1100    Specimen:  Blood  Updated:  03/08/18 1151     Glucose 66 (L) mg/dL      Comment: Meter: [id number]: [id number] WILVER SALGADO       POC Glucose Once [[id number]]  (Normal) Collected:  03/08/18 0738    Specimen:  Blood Updated:  03/08/18 0805     Glucose 112 mg/dL      Comment: RN NotifiedMeter: [id number]: [id number] WILVER SALGADO       Hardware / Foreign Body Culture - Hardware / Foreign Body, Foot, Left [[id number]]  (Abnormal) Collected:  03/05/18 2127    Specimen:  Hardware / Foreign Body from Foot, Left Updated:  03/08/18 0716     Hardware / Foreign Body Culture Culture in progress      Light growth (2+) Staphylococcus, coagulase negative (A)     Comment: Susceptibility not indicated          Gram Stain Result Rare (1+) WBCs seen      No No organisms seen    Wound Culture - Wound, Foot, Left [[id number]]  (Abnormal) Collected:  03/05/18 1751    Specimen:  Wound from Foot, Left Updated:  03/08/18 0710     Wound Culture --      Moderate growth (3+) Staphylococcus, coagulase negative (A)     Comment: Susceptibility not indicated          Gram Stain Result Rare (1+) Gram positive cocci in pairs      Few (2+) WBCs seen    Comprehensive Metabolic Panel [[id number]]  (Abnormal) Collected:  03/08/18 0505    Specimen:  Blood Updated:  03/08/18 0654     Glucose 92 mg/dL      BUN 22 (H) mg/dL      Creatinine 0.91 mg/dL      Sodium 142 mmol/L      Potassium 4.4 mmol/L      Chloride 101 mmol/L      CO2 27.0 mmol/L      Calcium 8.8 mg/dL      Total Protein 7.5 g/dL      Albumin 3.80 g/dL      ALT (SGPT) 47 U/L      AST (SGOT) 50 U/L      Alkaline Phosphatase 57 U/L      Total Bilirubin 0.4 mg/dL      eGFR Non African Amer 86 mL/min/1.73      Globulin 3.7 (H) gm/dL      A/G Ratio 1.0 (L) g/dL      BUN/Creatinine Ratio 24.2     Anion Gap 14.0 mmol/L     CBC & Differential [[id number]] Collected:  03/08/18 0505    Specimen:  Blood Updated:  03/08/18 0622    Narrative:       The following orders were created for panel order CBC &  Differential.  Procedure                               Abnormality         Status                     ---------                               -----------         ------                     CBC Auto Differential[[id number]]        Normal              Final result                 Please view results for these tests on the individual orders.    CBC Auto Differential [[id number]]  (Normal) Collected:  03/08/18 0505    Specimen:  Blood Updated:  03/08/18 0622     WBC 5.28 10*3/mm3      RBC 4.63 10*6/mm3      Hemoglobin 13.7 g/dL      Hematocrit 40.8 %      MCV 88.1 fL      MCH 29.6 pg      MCHC 33.6 g/dL      RDW 12.5 %      RDW-SD 40.3 fl      MPV 10.1 fL      Platelets 179 10*3/mm3      Neutrophil % 55.1 %      Lymphocyte % 30.1 %      Monocyte % 10.8 %      Eosinophil % 3.2 %      Basophil % 0.6 %      Immature Grans % 0.2 %      Neutrophils, Absolute 2.91 10*3/mm3      Lymphocytes, Absolute 1.59 10*3/mm3      Monocytes, Absolute 0.57 10*3/mm3      Eosinophils, Absolute 0.17 10*3/mm3      Basophils, Absolute 0.03 10*3/mm3      Immature Grans, Absolute 0.01 10*3/mm3     POC Glucose Once [[id number]]  (Normal) Collected:  03/07/18 2038    Specimen:  Blood Updated:  03/07/18 2211     Glucose 108 mg/dL      Comment: Meter: [id number]: [id number] LESLYE SAEZ       POC Glucose Once [[id number]]  (Normal) Collected:  03/07/18 1736    Specimen:  Blood Updated:  03/07/18 1754     Glucose 99 mg/dL      Comment: RN NotifiedMeter: [id number]: [id number] ALBER JA       POC Glucose Once [[id number]]  (Normal) Collected:  03/07/18 1039    Specimen:  Blood Updated:  03/07/18 1056     Glucose 91 mg/dL      Comment: RN NotifiedMeter: [id number]: [id number] ALBER MOSSA       Anaerobic Culture - Wound, Foot, Left [[id number]]  (Normal) Collected:  03/05/18 1751    Specimen:  Wound from Foot, Left Updated:  03/07/18 0838     Culture No anaerobes isolated     Gram Stain Result Rare (1+) WBCs seen      No  organisms seen    POC Glucose Once [[id number]]  (Normal) Collected:  03/07/18 0755    Specimen:  Blood Updated:  03/07/18 0812     Glucose 96 mg/dL      Comment: RN NotifiedMeter: [id number]: [id number] ALBER PERES       Vancomycin, Trough [[id number]]  (Normal) Collected:  03/07/18 0328    Specimen:  Blood Updated:  03/07/18 0400     Vancomycin Trough 12.24 mcg/mL     Comprehensive Metabolic Panel [[id number]]  (Abnormal) Collected:  03/07/18 0328    Specimen:  Blood Updated:  03/07/18 0353     Glucose 97 mg/dL      BUN 19 mg/dL      Creatinine 0.91 mg/dL      Sodium 143 mmol/L      Potassium 4.3 mmol/L      Chloride 104 mmol/L      CO2 25.0 mmol/L      Calcium 8.8 mg/dL      Total Protein 7.1 g/dL      Albumin 3.50 g/dL      ALT (SGPT) 35 U/L      AST (SGOT) 37 U/L      Alkaline Phosphatase 54 U/L      Total Bilirubin 0.4 mg/dL      eGFR Non African Amer 86 mL/min/1.73      Globulin 3.6 (H) gm/dL      A/G Ratio 1.0 (L) g/dL      BUN/Creatinine Ratio 20.9     Anion Gap 14.0 mmol/L     CBC & Differential [[id number]] Collected:  03/07/18 0328    Specimen:  Blood Updated:  03/07/18 0342    Narrative:       The following orders were created for panel order CBC & Differential.  Procedure                               Abnormality         Status                     ---------                               -----------         ------                     CBC Auto Differential[[id number]]        Abnormal            Final result                 Please view results for these tests on the individual orders.    CBC Auto Differential [[id number]]  (Abnormal) Collected:  03/07/18 0328    Specimen:  Blood Updated:  03/07/18 0342     WBC 4.68 10*3/mm3      RBC 4.49 10*6/mm3      Hemoglobin 13.2 (L) g/dL      Hematocrit 39.5 %      MCV 88.0 fL      MCH 29.4 pg      MCHC 33.4 g/dL      RDW 12.6 %      RDW-SD 40.7 fl      MPV 9.8 fL      Platelets 156 10*3/mm3      Neutrophil % 51.3 %      Lymphocyte % 32.3 %      Monocyte % 11.5 %       Eosinophil % 4.1 %      Basophil % 0.6 %      Immature Grans % 0.2 %      Neutrophils, Absolute 2.40 10*3/mm3      Lymphocytes, Absolute 1.51 10*3/mm3      Monocytes, Absolute 0.54 10*3/mm3      Eosinophils, Absolute 0.19 10*3/mm3      Basophils, Absolute 0.03 10*3/mm3      Immature Grans, Absolute 0.01 10*3/mm3     POC Glucose Once [[id number]]  (Normal) Collected:  03/06/18 1939    Specimen:  Blood Updated:  03/06/18 2127     Glucose 96 mg/dL      Comment: Meter: [id number]: [id number] PACE LANETTE       POC Glucose Once [[id number]]  (Normal) Collected:  03/06/18 1651    Specimen:  Blood Updated:  03/06/18 1721     Glucose 82 mg/dL      Comment: Meter: [id number]: [id number] ELISABETH PENG       POC Glucose Once [[id number]]  (Normal) Collected:  03/06/18 1109    Specimen:  Blood Updated:  03/06/18 1137     Glucose 77 mg/dL      Comment: Meter: [id number]: [id number] ELISABETH PENG       Comprehensive Metabolic Panel [[id number]]  (Abnormal) Collected:  03/06/18 0727    Specimen:  Blood Updated:  03/06/18 0759     Glucose 82 mg/dL      BUN 19 mg/dL      Creatinine 0.86 mg/dL      Sodium 139 mmol/L      Potassium 4.4 mmol/L      Chloride 102 mmol/L      CO2 28.0 mmol/L      Calcium 8.5 mg/dL      Total Protein 7.0 g/dL      Albumin 3.50 g/dL      ALT (SGPT) 30 U/L      AST (SGOT) 27 U/L      Alkaline Phosphatase 53 U/L      Total Bilirubin 0.6 mg/dL      eGFR Non African Amer 92 mL/min/1.73      Globulin 3.5 gm/dL      A/G Ratio 1.0 (L) g/dL      BUN/Creatinine Ratio 22.1     Anion Gap 9.0 mmol/L     CBC & Differential [[id number]] Collected:  03/06/18 0727    Specimen:  Blood Updated:  03/06/18 0746    Narrative:       The following orders were created for panel order CBC & Differential.  Procedure                               Abnormality         Status                     ---------                               -----------         ------                     CBC Auto  Differential[[id number]]        Abnormal            Final result                 Please view results for these tests on the individual orders.    CBC Auto Differential [[id number]]  (Abnormal) Collected:  03/06/18 0727    Specimen:  Blood Updated:  03/06/18 0746     WBC 4.66 10*3/mm3      RBC 4.21 (L) 10*6/mm3      Hemoglobin 12.6 (L) g/dL      Hematocrit 37.8 (L) %      MCV 89.8 fL      MCH 29.9 pg      MCHC 33.3 g/dL      RDW 12.9 %      RDW-SD 42.7 fl      MPV 9.7 fL      Platelets 152 10*3/mm3      Neutrophil % 58.2 %      Lymphocyte % 26.6 %      Monocyte % 11.2 %      Eosinophil % 3.6 %      Basophil % 0.2 %      Immature Grans % 0.2 %      Neutrophils, Absolute 2.71 10*3/mm3      Lymphocytes, Absolute 1.24 10*3/mm3      Monocytes, Absolute 0.52 10*3/mm3      Eosinophils, Absolute 0.17 10*3/mm3      Basophils, Absolute 0.01 10*3/mm3      Immature Grans, Absolute 0.01 10*3/mm3     POC Glucose Once [[id number]]  (Normal) Collected:  03/06/18 0722    Specimen:  Blood Updated:  03/06/18 0738     Glucose 81 mg/dL      Comment: Meter: [id number]: [id number] ELISABETH PENG       POC Glucose Once [[id number]]  (Normal) Collected:  03/05/18 1953    Specimen:  Blood Updated:  03/05/18 2151     Glucose 72 mg/dL      Comment: RN NotifiedMeter: [id number]: [id number] FRANDY POPE       POC Glucose Once [[id number]]  (Normal) Collected:  03/05/18 1829    Specimen:  Blood Updated:  03/05/18 1850     Glucose 83 mg/dL      Comment: Meter: [id number]: [id number] PASTOR LOCKE       POC Glucose Once [[id number]]  (Normal) Collected:  03/05/18 1556    Specimen:  Blood Updated:  03/05/18 1611     Glucose 76 mg/dL      Comment: Meter: [id number]: [id number] RACHEL IBARRA       Comprehensive Metabolic Panel [[id number]]  (Abnormal) Collected:  03/05/18 1519    Specimen:  Blood Updated:  03/05/18 1607     Glucose 77 mg/dL      BUN 23 (H) mg/dL      Creatinine 0.75 mg/dL       Sodium 138 mmol/L      Potassium 4.4 mmol/L      Chloride 100 mmol/L      CO2 25.0 mmol/L      Calcium 8.4 mg/dL      Total Protein 6.4 g/dL      Albumin 3.40 g/dL      ALT (SGPT) 39 U/L      AST (SGOT) 21 U/L      Alkaline Phosphatase 55 U/L      Total Bilirubin 0.5 mg/dL      eGFR Non African Amer 108 mL/min/1.73      Globulin 3.0 gm/dL      A/G Ratio 1.1 g/dL      BUN/Creatinine Ratio 30.7 (H)     Anion Gap 13.0 mmol/L     CBC & Differential [[id number]] Collected:  03/05/18 1519    Specimen:  Blood Updated:  03/05/18 1555    Narrative:       The following orders were created for panel order CBC & Differential.  Procedure                               Abnormality         Status                     ---------                               -----------         ------                     CBC Auto Differential[[id number]]        Abnormal            Final result                 Please view results for these tests on the individual orders.    CBC Auto Differential [[id number]]  (Abnormal) Collected:  03/05/18 1519    Specimen:  Blood Updated:  03/05/18 1555     WBC 4.88 10*3/mm3      RBC 4.12 (L) 10*6/mm3      Hemoglobin 12.4 (L) g/dL      Hematocrit 36.8 (L) %      MCV 89.3 fL      MCH 30.1 pg      MCHC 33.7 g/dL      RDW 13.0 %      RDW-SD 43.0 fl      MPV 10.0 fL      Platelets 146 (L) 10*3/mm3      Neutrophil % 54.5 %      Lymphocyte % 24.8 %      Monocyte % 17.2 (H) %      Eosinophil % 3.3 %      Basophil % 0.2 %      Immature Grans % 0.0 %      Neutrophils, Absolute 2.66 10*3/mm3      Lymphocytes, Absolute 1.21 10*3/mm3      Monocytes, Absolute 0.84 10*3/mm3      Eosinophils, Absolute 0.16 10*3/mm3      Basophils, Absolute 0.01 10*3/mm3      Immature Grans, Absolute 0.00 10*3/mm3           Imaging Results (last 24 hours)     ** No results found for the last 24 hours. 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, initial encounter [T84.7XXA]       Post-Op Diagnosis Codes:     * Cellulitis of left foot [L03.116]     * Infected hardware in left lower extremity, initial encounter [T84.7XXA]    Procedure/CPT® Codes:      Procedure(s):  INCISION AND DRAINAGE LOWER EXTREMITY  ANKLE/FOOT HARDWARE REMOVAL  Bone biopsy left hallux    Surgeon(s):  Marco A Thompson DPM    Anesthesia: Choice    Staff:   Circulator: Galina Godfrey RN  Scrub Person: Ines Jeronimo    Estimated Blood Loss: minimal    Urine Voided: * No values recorded between 3/5/2018  5:21 PM and 3/5/2018  6:26 PM *    Specimens:                  ID Type Source Tests Collected by Time Destination   1 : aerobic wound culture Wound Foot, Left WOUND CULTURE Marco A Thompson DPM 3/5/2018 1751    2 : anaerobic wound culture Wound Foot, Left ANAEROBIC CULTURE Marco A Thompson DPM 3/5/2018 1751    3 : culture bone left foot Bone Foot, Left TISSUE / BONE CULTURE Marco A Thompson DPM 3/5/2018 1804    4 : culture old screw left foot Tissue Foot, Left TISSUE / BONE CULTURE Marco A Thompson DPM 3/5/2018 1806          Drains:           Findings: small pocket of infection noted     Complications: none      Marco A Thompson DPM     Date: 3/5/2018  Time: 6:26 PM        Electronically signed by Marco A Thompson DPM at 3/5/2018  6:27 PM     Marco A Thompson DPM at 3/7/2018  1:03 PM  Version 1 of 1         FOOT IRRIGATION, DEBRIDEMENT AND REPAIR  Progress Note    Emre Lisa  3/5/2018 - 3/7/2018    Pre-op Diagnosis:   Cellulitis of left foot [L03.116]  Infected hardware in left lower extremity, subsequent encounter [T84.7XXD]       Post-Op Diagnosis Codes:     * Cellulitis of left foot [L03.116]     * Infected hardware in left lower extremity, subsequent encounter [T84.7XXD]    Procedure/CPT®  Codes:      Procedure(s):  FOOT IRRIGATION, DEBRIDEMENT     Surgeon(s):  Marco A Thompson DPM    Anesthesia: Monitor Anesthesia Care    Staff:   Circulator: Dev Terry RN  Scrub Person: Yaa Keith  Assistant: Megan Pate    Estimated Blood Loss: minimal    Urine Voided: * No values recorded between 3/7/2018 12:29 PM and 3/7/2018  1:01 PM *    Specimens:                  ID Type Source Tests Collected by Time Destination   1 : BONE LEFT TALUS Wound Toe, Left WOUND CULTURE Marco A Thompson DPM 3/7/2018 1246    2 : LEFT GREAT TOE Wound Toe, Left WOUND CULTURE Marco A Thompson DPM 3/7/2018 1247          Drains:           Findings: consistent with pre-op dx    Complications: none      Marco A Thompson DPM     Date: 3/7/2018  Time: 1:03 PM        Electronically signed by Marco A Thompson DPM at 3/7/2018  1:03 PM     Marco A Thompson DPM at 3/7/2018  6:32 PM  Version 1 of 1       Date of Procedure: 03/05/18     SURGEON: Marco A Thompson DPM      ASSISTANT: LLOYD Richardson      PREOPERATIVE DIAGNOSIS:   1.  Cellulitis left foot  2.  Infected hardware left foot   3.  Osteomyelitis left foot    POSTOPERATIVE DIAGNOSIS: Same     PROCEDURES PERFORMED:   1.  Incision and drainage left foot  2.  Hardware removal left foot  3.  Bone biopsy left great toe    ANESTHESIA: Mac with a local block     HEMOSTASIS: Direct pressure      ESTIMATED BLOOD LOSS: 25 mL.      MATERIALS: Quarter-inch iodoform packing gauze     INJECTABLES: 20 cc of half percent Marcaine plain     COMPLICATIONS: None.      CONDITION: Stable.     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  He had hallux interphalangeal joint arthrodesis proximal 3 weeks ago with subsequent infection.  He agrees to undergo the surgical intervention at this time. Consent is signed and documented in  the chart. History and physical exam were reviewed prior to patient being taken to the operating room and n.p.o. status was confirmed. No guarantees were given or implied regarding the outcome of this treatment.      DESCRIPTION OF PROCEDURE: After mild sedation was administered by the anesthesia team in the preoperative holding area the patient was transported to the operating room and placed in a supine position.  IV sedation was then administered and the left foot was prepped and draped in usual sterile technique and a timeout was performed to confirm the correct patient, correct site, and correct procedure.      Attention was then directed to the left foot where there is noted to be cellulitis streaking from the left great toe of the midfoot.  The great toe was extremely edematous and erythematous.  There is drainage noted coming from the incision site.  At this time sutures and the incision site were removed.  The incision site was reopened and a small pocket of infection was expressed.  This infection was cultured and will be sent to [facility].  Next the 5-0 cannulated screw was removed and was sent to pathology.  Next the incision site was flushed with copious amount of antibiotic irrigation.  A bone biopsy was then obtained with lory Peralta and will be sent to microbiology for analysis.  Two 3-0 nylon retention sutures were placed in the incision site was packed with iodoform packing gauze.  A sterile dressing was applied.  The patient tolerated procedure and anesthesia well and was transported from the operating room to the PACU with vital signs stable and neurovascular status intact to the operative extremity.    The plan is to discharge patient back to the floor once stable per anesthesia.  He can resume his normal diet.  Continue antibiotics per primary care team.  We'll follow up wound culture and bone biopsy to tailor her antibiotic therapy.  Plan for repeat irrigation and debridement in  the next couple of days.             This document has been electronically signed by Marco A Thompson DPM on March 7, 2018 6:33 PM              Electronically signed by Marco A Thompson DPM at 3/7/2018  6:39 PM     Marco A Thompson DPM at 3/7/2018  6:41 PM  Version 1 of 1       Date of Procedure: 03/07/18     SURGEON: Marco A Thompson DPM      ASSISTANT: LLOYD Richardson      PREOPERATIVE DIAGNOSIS:   1.  Cellulitis left foot  2.  Osteomyelitis left foot     POSTOPERATIVE DIAGNOSIS: Same     PROCEDURES PERFORMED: Irrigation and debridement left foot     ANESTHESIA: IV sedation     HEMOSTASIS: Direct pressure      ESTIMATED BLOOD LOSS: 25 mL.      MATERIALS: Quarter-inch iodoform packing gauze     INJECTABLES: None     COMPLICATIONS: None.      CONDITION: Stable.     PATHOLOGY: Bone and tissue sent to microbiology for Gram stain and culture     INDICATIONS FOR PROCEDURE: This is a patient of mine who is currently admitted to the hospital with osteomyelitis secondary to hardware infection.  He agrees to undergo the surgical intervention at this time. Consent is signed and documented in the chart. History and physical exam were reviewed prior to patient being taken to the operating room and n.p.o. status was confirmed. No guarantees were given or implied regarding the outcome of this treatment.      DESCRIPTION OF PROCEDURE: After mild sedation was administered by the anesthesia team in the preoperative holding area the patient was transported to the operating room and placed in a supine position.  IV sedation was then administered and the left foot was prepped and draped in usual sterile technique and a timeout was performed to confirm the correct patient, correct site, and correct procedure.      Attention was then directed to the left foot there is noted to be a packed open left hallux.  Retention sutures are removed and iodoform packing gauze was removed.  There is noted to be a large open wound measuring  approximately 4 cm x 3 cm x 2cm. no purulent drainage noted at this time.  Slight greenish color noted to the distal tip of the hallux.  Improved cellulitis and edema noted.  At this time a debridement was performed with a Rogeur of all nonviable tissue.  Post-debridement measurements of the wound are 4 cm x 3 cm x 2 cm.  The site was flushed with copious amounts of antibiotic irrigation.  It was impacted with iodoform packing gauze and two 3-0 nylon retention sutures used the whole packing in place.  Betadine soaked Adaptic followed by dry sterile dressing was applied.  The patient tolerated the procedure well and was transferred from the operating room to the PACU with vital signs stable and neurovascular status intact to the operative extremity.    Plan is to discharge patient back to the floor once stable per anesthesia.  He can resume his normal diet.  He can be partial weightbearing to the left heel.  He is given dressing clean, dry and intact.  IV antibiotics per primary care team.  Plans for repeat trip to the operating room for irrigation and delayed primary closure in the next couple of days.             This document has been electronically signed by Marco A Thompson DPM on March 7, 2018 6:41 PM           Electronically signed by Marco A Thompson DPM at 3/7/2018  6:46 PM     Marco A Thompson DPM at 3/10/2018  6:29 AM  Version 1 of 1       FOOT IRRIGATION, DEBRIDEMENT AND REPAIR  Progress Note    Emre Bergeron Sloan  3/5/2018 - 3/10/2018    Pre-op Diagnosis:   Cellulitis of left foot [L03.116]  Infected hardware in left lower extremity, subsequent encounter [T84.7XXD]       Post-Op Diagnosis Codes:     * Cellulitis of left foot [L03.116]     * Infected hardware in left lower extremity, subsequent encounter [T84.7XXD]    Procedure/CPT® Codes:      Procedure(s):  FOOT IRRIGATION, DEBRIDEMENT AND REPAIR    Surgeon(s):  Marco A Thompson DPM    Anesthesia: Monitor Anesthesia Care    Staff:   Circulator: Galina CUTLER  EDY Cordero  Scrub Person: Fernandez Castillo  Assistant: Megan Pate    Estimated Blood Loss: minimal    Urine Voided: * No values recorded between 3/10/2018  7:15 AM and 3/10/2018  8:04 AM *    Specimens:                None      Drains:      Findings: consistent with pre-op dx    Complications: none      Marco A Thompson DPM     Date: 3/10/2018  Time: 8:04 AM          Electronically signed by Marco A Thompson DPM at 3/10/2018  8:04 AM     Marco A Thompson DPM at 3/10/2018  6:29 AM  Version 1 of 1       Date of Procedure: 03/10/18     SURGEON: Marco A Thompson DPM      ASSISTANT: LLOYD Richardson      PREOPERATIVE DIAGNOSIS: Osteomyelitis left foot     POSTOPERATIVE DIAGNOSIS: Osteomyelitis left foot     PROCEDURES PERFORMED: Delayed primary closure left foot     ANESTHESIA: IV sedation     HEMOSTASIS: Direct pressure      ESTIMATED BLOOD LOSS: 10 mL.      MATERIALS: None     INJECTABLES: None     COMPLICATIONS: None.      CONDITION: Stable.     PATHOLOGY: None     INDICATIONS FOR PROCEDURE: This is a patient of mine who has hardware infection osteomyelitis to his left great toe.  He has already had hardware removal and irrigation and debridement procedures.  Plan today is for delayed closure of the surgical wound.  He agrees to undergo surgical intervention at this time. Consent is signed and documented in the chart. History and physical exam were reviewed prior to patient being taken to the operating room and n.p.o. status was confirmed. No guarantees were given or implied regarding the outcome of this treatment.      DESCRIPTION OF PROCEDURE: After mild sedation was administered by the anesthesia team in the preoperative holding area the patient was transported to the operating room and placed in a supine position.  IV sedation was then administered and the left foot was prepped and draped in usual sterile technique and a timeout was performed to confirm the correct patient, correct site, and correct  procedure.      Attention was then directed to the left foot with the hallux incision site was loosely approximated with 2 nylon sutures and was packed with iodoform packing gauze.  Erythema and edema noted in previous surgical cases has resolved at this time.  No foul odor purulent drainage is noted.  At this time the nylon sutures removed and the incision site was flushed with copious amounts of antibiotic irrigation.  Next a layered closure was performed utilizing 3-0 Vicryl for the deep tissue followed by 3-0 Prolene in a simple interrupted technique for the skin.  A sterile dressing was applied.  The patient tolerated the procedure and anesthesia well and was transported from the operating room to the PACU with vital signs stable and neurovascular status intact to the operative extremity.    The plan is to discharge patient back to the floor once stable per anesthesia.  He can resume his normal diet.  He can be weightbearing as tolerated in surgical shoe.  He is to keep his dressing clean, dry and intact.  No further surgical intervention planned on this admission.  Plan is for 6 weeks of IV antibiotics based on previous bone culture result.  The patient will follow up with me 1 week following discharge from the hospital.             This document has been electronically signed by Marco A Thompson DPM on March 10, 2018 8:54 AM         Electronically signed by Marco A Thompson DPM at 3/10/2018  8:58 AM          Physician Progress Notes (last 72 hours) (Notes from 3/9/2018 10:39 AM through 3/12/2018 10:39 AM)      Marco A Thompson DPM at 3/12/2018  8:02 AM           LOS: 7 days   Patient Care Team:  Jamaal Bravo MD as PCP - General  Marco A Thompson DPM as Consulting Physician (Podiatry)    Chief Complaint: left foot infection    Subjective   Patient sitting up in a chair. NAD. Denies pain.     History of Present Illness    Subjective    History taken from: patient    Objective     Vital Signs  Temp:  [97 °F  "(36.1 °C)-98.6 °F (37 °C)] 98 °F (36.7 °C)  Heart Rate:  [67-76] 71  Resp:  [18] 18  BP: (113-124)/(70-78) 124/74    Objective:  Vital signs: (most recent): Blood pressure 124/74, pulse 71, temperature 98 °F (36.7 °C), resp. rate 18, height 193 cm (75.98\"), weight 131 kg (288 lb), SpO2 97 %.               Constitutional: well developed, well nourished    HEENT: Normocephalic and atraumatic, normal hearing    Respiratory: Non labored respirations noted    LLE: Dressing is clean, dry and intact.       Psychiatric: A&O x 3 with normal mood and affect. NAD.     Results Review:     I reviewed the patient's new clinical results.      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     Marco A Thompson DPM  03/12/18  8:02 AM        Electronically signed by Marco A Thompson DPM at 3/12/2018  8:21 AM     BRYAN Ocampo at 3/11/2018 10:14 AM     Attestation signed by Caio Hall MD at 3/11/2018 11:25 PM    I personally evaluated and examined the patient in conjunction with Dev Dye PA-C and agree with the assessment, treatment plan, and disposition of the patient as recorded.    Exam:  CV: S1 + S2          This document has been electronically signed by Caio Hall MD on March 11, 2018 11:25 PM                            [facility] Medicine Services  INPATIENT PROGRESS NOTE    Length of Stay: 6  Date of Admission: 3/5/2018  Primary Care Physician: Jamaal Bravo MD    Subjective "   Please note that all previous progress notes, medication changes, lab findings, Radiographical changes, and physical exam findings have been noted and updated as appropriate.    3/11/2018: Continues to have no complaints.  Continues to grow staph species, multiple colonies.  All are sensitive to vancomycin.  Secondary to sensitivity to vancomycin, are also sensitive to Zyvox.  Have discussed with pharmacy.  Zyvox is indicated for osteomyelitis, however there are multiple side effects to using Zyvox for a prolonged period of time.  We will have to discuss risk and benefits with patient and furthermore with pharmacy as to whether or not we will continue with vancomycin versus Zyvox.  Vancomycin changed to every 12 hours from every 8 hours.    3/10/2018: Patient has no pain or complaints.  Did undergo further operative washout procedure stay.  Dr. Thompson states that the patient foot looks much better and that he is cleared from podiatry point view, but still needs at least 2-4 weeks of IV antibiotics, vancomycin, followed by oral antibiotics for another 2-4 weeks.  At this time or he with case management to get approval for IV antibiotics outpatient.  At this time patient has vancomycin every 8 hours, patient may require LTAC or acute rehabilitation.    3/9/2018: Patient has no complaints.  Dr. Thompson has recommended magic butt cream for moisturization of right foot.  No fever or chills, no nausea or vomiting.  Will have a follow-up OR procedure tomorrow with Dr. Thompson.    Chief Complaint/HPI:  This 57 year old male was admitted to hospitalist services secondary to left foot cellulitis.  Patient has also been evaluated and treated by Dr. Thompson of podiatry.  Patient has undergone irrigation and debridement of the left foot.  Patient currently growing coagulase-negative staph.  Though this strain is predictably sensitive to penicillin and clindamycin, per medical literature vancomycin is the preferred drug of choice for  osteomyelitis for this group.  All other antibiotics have been de-escalated.  Patient has no pain.  He is scheduled to return to the operating suite within 48 hours for another washout and further treatment.    Review of Systems   Constitutional: Negative for chills and fever.   Respiratory: Negative for shortness of breath.    Cardiovascular: Negative for chest pain.   Gastrointestinal: Negative for abdominal pain, nausea and vomiting.      All pertinent negatives and positives are as above. All other systems have been reviewed and are negative unless otherwise stated.     Objective    Temp:  [96.2 °F (35.7 °C)-99.4 °F (37.4 °C)] 97.2 °F (36.2 °C)  Heart Rate:  [68-74] 70  Resp:  [18-19] 18  BP: (116-120)/(61-86) 116/73    Physical Exam   Constitutional: He is oriented to person, place, and time. He appears well-developed and well-nourished. No distress.   HENT:   Head: Normocephalic and atraumatic.   Cardiovascular: Normal rate and regular rhythm.    Pulmonary/Chest: Effort normal and breath sounds normal. No respiratory distress. He has no wheezes.   Neurological: He is alert and oriented to person, place, and time.   Skin: Skin is dry. He is not diaphoretic.   Psychiatric: He has a normal mood and affect. His behavior is normal.     Results Review:  I have reviewed the labs, radiology results, and diagnostic studies.    Laboratory Data:     Results from last 7 days  Lab Units 03/11/18  0607 03/10/18  0559 03/09/18  0818   SODIUM mmol/L 139 141 139   POTASSIUM mmol/L 4.2 4.3 4.6   CHLORIDE mmol/L 101 103 101   CO2 mmol/L 27.0 25.0 27.0   BUN mg/dL 21 20 21   CREATININE mg/dL 0.99 0.91 0.86   GLUCOSE mg/dL 99 96 114*   CALCIUM mg/dL 9.0 8.6 9.1   BILIRUBIN mg/dL 0.2 0.2 0.3   ALK PHOS U/L 51 50 56   ALT (SGPT) U/L 103* 129* 104*   AST (SGOT) U/L 54 87* 101*   ANION GAP mmol/L 11.0 13.0 11.0     Estimated Creatinine Clearance: 124 mL/min (by C-G formula based on SCr of 0.99 mg/dL).            Results from last 7  days  Lab Units 03/11/18  0607 03/10/18  0559 03/09/18  0818 03/08/18  0505 03/07/18  0328   WBC 10*3/mm3 6.17 5.96 5.08 5.28 4.68   HEMOGLOBIN g/dL 12.9* 13.2* 13.7 13.7 13.2*   HEMATOCRIT % 39.0 39.0 40.6 40.8 39.5   PLATELETS 10*3/mm3 176 174 197 179 156           Culture Data:   No results found for: BLOODCX  No results found for: URINECX  No results found for: RESPCX  No results found for: WOUNDCX  No results found for: STOOLCX  No components found for: BODYFLD    Radiology Data:   Imaging Results (last 24 hours)     ** No results found for the last 24 hours. **          I have reviewed the patient current medications.     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  Discharge planning.  Patient does live at home with his wife, however wife per patient is not agreeable or appropriate for home health teaching for IV antibiotic administration.                This document has been electronically signed by BRYAN Ocampo on March 11, 2018 10:14 AM        Electronically signed by Caio Hall MD at 3/11/2018 11:25 PM     BRYAN Ocampo at 3/10/2018 11:23 AM     Attestation signed by Caio Hall MD at 3/10/2018  3:02 PM    I personally evaluated and examined the patient in conjunction with Dev Dye PA-C and agree with the assessment, treatment plan, and disposition of the patient as recorded.    Exam:  CV: S1 + S2  Chest: Clear          This document has been electronically signed by Caio Hall MD on March 10, 2018 3:02 PM                             [facility] Medicine Services  INPATIENT PROGRESS NOTE    Length of Stay: 5  Date of Admission: 3/5/2018  Primary Care Physician: Jamaal Bravo MD    Subjective   Please note that all previous progress notes, medication changes, lab findings, Radiographical changes, and physical exam findings have been noted and updated as appropriate.    3/10/2018: Patient has no pain or complaints.  Did undergo further operative washout procedure stay.  Dr. Thompson states that the patient foot looks much better and that he is cleared from podiatry point view, but still needs at least 2-4 weeks of IV antibiotics, vancomycin, followed by oral antibiotics for another 2-4 weeks.  At this time or he with case management to get approval for IV antibiotics outpatient.  At this time patient has vancomycin every 8 hours, patient may require LTAC or acute rehabilitation.    3/9/2018: Patient has no complaints.  Dr. Thompson has recommended magic butt cream for moisturization of right foot.  No fever or chills, no nausea or vomiting.  Will have a follow-up OR procedure tomorrow with Dr. Thompson.    Chief Complaint/HPI:  This 57 year old male was admitted to hospitalist services secondary to left foot cellulitis.  Patient has also been evaluated and treated by Dr. Thompson of podiatry.  Patient has undergone irrigation and debridement of the left foot.  Patient currently growing coagulase-negative staph.  Though this strain is predictably sensitive to penicillin and clindamycin, per medical literature vancomycin is the preferred drug of choice for osteomyelitis for this group.  All other antibiotics have been de-escalated.  Patient has no pain.  He is scheduled to return to the operating suite within 48 hours for another washout and further treatment.    Review of Systems   Constitutional: Negative for chills and fever.   Respiratory: Negative for shortness of breath.    Cardiovascular: Negative for  chest pain.   Gastrointestinal: Negative for abdominal pain, nausea and vomiting.      All pertinent negatives and positives are as above. All other systems have been reviewed and are negative unless otherwise stated.     Objective    Temp:  [95.9 °F (35.5 °C)-98.4 °F (36.9 °C)] 96 °F (35.6 °C)  Heart Rate:  [65-76] 67  Resp:  [18] 18  BP: ()/(57-80) 111/72    Physical Exam   Constitutional: He is oriented to person, place, and time. He appears well-developed and well-nourished. No distress.   HENT:   Head: Normocephalic and atraumatic.   Cardiovascular: Normal rate and regular rhythm.    Pulmonary/Chest: Effort normal and breath sounds normal. No respiratory distress. He has no wheezes.   Abdominal: Soft. Bowel sounds are normal. He exhibits no distension. There is no tenderness.   Neurological: He is alert and oriented to person, place, and time.   Skin: Skin is warm and dry. He is not diaphoretic.   Psychiatric: He has a normal mood and affect. His behavior is normal.     Results Review:  I have reviewed the labs, radiology results, and diagnostic studies.    Laboratory Data:     Results from last 7 days  Lab Units 03/10/18  0559 03/09/18  0818 03/08/18  0505   SODIUM mmol/L 141 139 142   POTASSIUM mmol/L 4.3 4.6 4.4   CHLORIDE mmol/L 103 101 101   CO2 mmol/L 25.0 27.0 27.0   BUN mg/dL 20 21 22*   CREATININE mg/dL 0.91 0.86 0.91   GLUCOSE mg/dL 96 114* 92   CALCIUM mg/dL 8.6 9.1 8.8   BILIRUBIN mg/dL 0.2 0.3 0.4   ALK PHOS U/L 50 56 57   ALT (SGPT) U/L 129* 104* 47   AST (SGOT) U/L 87* 101* 50   ANION GAP mmol/L 13.0 11.0 14.0     Estimated Creatinine Clearance: 134.9 mL/min (by C-G formula based on SCr of 0.91 mg/dL).            Results from last 7 days  Lab Units 03/10/18  0559 03/09/18  0818 03/08/18  0505 03/07/18  0328 03/06/18  0727   WBC 10*3/mm3 5.96 5.08 5.28 4.68 4.66   HEMOGLOBIN g/dL 13.2* 13.7 13.7 13.2* 12.6*   HEMATOCRIT % 39.0 40.6 40.8 39.5 37.8*   PLATELETS 10*3/mm3 174 197 179 156 152            Culture Data:   No results found for: BLOODCX  No results found for: URINECX  No results found for: RESPCX  No results found for: WOUNDCX  No results found for: STOOLCX  No components found for: BODYFLD    Radiology Data:   Imaging Results (last 24 hours)     ** No results found for the last 24 hours. **          I have reviewed the patient current medications.     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                This document has been electronically signed by BRYAN Ocampo on March 10, 2018 11:24 AM        Electronically signed by Caio Hall MD at 3/10/2018  3:02 PM     BRYAN Ocampo at 3/9/2018 11:54 AM     Attestation signed by Caio Hall MD at 3/9/2018  6:06 PM    I personally evaluated and examined the patient in conjunction with Dev Dye PA-C and agree with the assessment, treatment plan, and disposition of the patient as recorded.    Exam:  CV: S1 + S2          This document has been electronically signed by Caio Hall MD on March 9, 2018 6:06 PM                            [facility] Medicine Services  INPATIENT PROGRESS NOTE    Length of Stay: 4  Date of Admission: 3/5/2018  Primary Care Physician: Jamaal Bravo MD    Subjective   Please note that all previous progress notes, medication changes, lab findings, Radiographical changes, and physical exam findings have been noted and updated as appropriate.    3/9/2018: Patient has no complaints.  Dr. Thompson has recommended magic butt  cream for moisturization of right foot.  No fever or chills, no nausea or vomiting.  Will have a follow-up OR procedure tomorrow with Dr. Thompson.    Chief Complaint/HPI:  This 57 year old male was admitted to hospitalist services secondary to left foot cellulitis.  Patient has also been evaluated and treated by Dr. Thompson of podiatry.  Patient has undergone irrigation and debridement of the left foot.  Patient currently growing coagulase-negative staph.  Though this strain is predictably sensitive to penicillin and clindamycin, per medical literature vancomycin is the preferred drug of choice for osteomyelitis for this group.  All other antibiotics have been de-escalated.  Patient has no pain.  He is scheduled to return to the operating suite within 48 hours for another washout and further treatment.    Review of Systems   Constitutional: Negative for chills and fever.   Respiratory: Negative for shortness of breath.    Cardiovascular: Negative for chest pain.   Gastrointestinal: Negative for abdominal pain, nausea and vomiting.      All pertinent negatives and positives are as above. All other systems have been reviewed and are negative unless otherwise stated.     Objective    Temp:  [96.1 °F (35.6 °C)-98 °F (36.7 °C)] 96.7 °F (35.9 °C)  Heart Rate:  [62-79] 75  Resp:  [18] 18  BP: (112-123)/(57-79) 112/71    Physical Exam   Constitutional: He is oriented to person, place, and time. He appears well-developed and well-nourished. No distress.   HENT:   Head: Normocephalic and atraumatic.   Cardiovascular: Normal rate and regular rhythm.    Pulmonary/Chest: Effort normal and breath sounds normal. No respiratory distress. He has no wheezes.   Abdominal: Soft. Bowel sounds are normal. He exhibits no distension. There is no tenderness.   Neurological: He is alert and oriented to person, place, and time.   Skin: Skin is warm and dry. He is not diaphoretic.   Psychiatric: He has a normal mood and affect. His behavior is  normal.     Results Review:  I have reviewed the labs, radiology results, and diagnostic studies.    Laboratory Data:     Results from last 7 days  Lab Units 03/09/18  0818 03/08/18  0505 03/07/18  0328   SODIUM mmol/L 139 142 143   POTASSIUM mmol/L 4.6 4.4 4.3   CHLORIDE mmol/L 101 101 104   CO2 mmol/L 27.0 27.0 25.0   BUN mg/dL 21 22* 19   CREATININE mg/dL 0.86 0.91 0.91   GLUCOSE mg/dL 114* 92 97   CALCIUM mg/dL 9.1 8.8 8.8   BILIRUBIN mg/dL 0.3 0.4 0.4   ALK PHOS U/L 56 57 54   ALT (SGPT) U/L 104* 47 35   AST (SGOT) U/L 101* 50 37   ANION GAP mmol/L 11.0 14.0 14.0     Estimated Creatinine Clearance: 142.8 mL/min (by C-G formula based on Cr of 0.86).            Results from last 7 days  Lab Units 03/09/18 0818 03/08/18  0505 03/07/18  0328 03/06/18  0727 03/05/18  1519   WBC 10*3/mm3 5.08 5.28 4.68 4.66 4.88   HEMOGLOBIN g/dL 13.7 13.7 13.2* 12.6* 12.4*   HEMATOCRIT % 40.6 40.8 39.5 37.8* 36.8*   PLATELETS 10*3/mm3 197 179 156 152 146*           Culture Data:   No results found for: BLOODCX  No results found for: URINECX  No results found for: RESPCX  No results found for: WOUNDCX  No results found for: STOOLCX  No components found for: BODYFLD    Radiology Data:   Imaging Results (last 24 hours)     ** No results found for the last 24 hours. **          I have reviewed the patient current medications.     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                This document has been electronically signed by BRYAN Ocampo on March 9, 2018 11:54 AM         Electronically signed by Caio Hall MD at 3/9/2018  6:06 PM

## 2018-03-12 NOTE — CONSULTS
Adult Nutrition  Assessment    Patient Name:  Emre Lisa  YOB: 1960  MRN: [id number]  Admit Date:  3/5/2018    Assessment Date:  3/12/2018    Comments:  RD consult r/t LOS. Pt continues on iv abx r/t infection of great toe. Reports eating well and is satisfied w/our  thus far (pt requests very low CHO diet options, which RD has discussed w/him previously). RD will monitor prn.          Adult Nutrition Assessment     Row Name 03/12/18 1115       Nutrition Prescription PO    Current PO Diet Regular    Common Modifiers Consistent Carbohydrate       PO Evaluation    % PO Intake % (limited documentation)    Row Name 03/12/18 1113       Calculation Measurements    Weight Used For Calculations 91.6 kg (202 lb)       Estimated/Assessed Needs    Additional Documentation Calorie Requirements (Group);Fluid Requirements (Group);Protein Requirements (Group)       Calorie Requirements    Estimated Calorie Requirement (kcal/day) --   [phone]    Estimated Calorie Need Method kcal/kg       KCAL/KG    14 Kcal/Kg (kcal) 1282.78    15 Kcal/Kg (kcal) 1374.41    18 Kcal/Kg (kcal) 1649.29    20 Kcal/Kg (kcal) 1832.54    25 Kcal/Kg (kcal) 2290.68    30 Kcal/Kg (kcal) 2748.81    35 Kcal/Kg (kcal) 3206.95    40 Kcal/Kg (kcal) 3665.08    45 Kcal/Kg (kcal) 4123.22    50 Kcal/Kg (kcal) 4581.35       Gunlock-St. Jeor Equation    RMR (Gunlock-St. Jeor Equation) 1852.52       Protein Requirements    Est Protein Requirement Amount (gms/kg) 1.0 gm protein    Estimated Protein Requirements (gms/day) 91.63       Fluid Requirements    Estimated Fluid Requirements (mL/day) 2200    Estimated Fluid Requirement Method RDA Method    RDA Method (mL) 2200    Rosalio-Jeremiah Method (over 20 kg) 3332.54    Row Name 03/12/18 1112       Labs/Procedures/Meds    Lab Results Reviewed reviewed, pertinent       Physical Findings    Skin other (see comments)   surgical foot wound    Row Name 03/12/18 1111       Nutrition/Diet  History    Typical Food/Fluid Intake Pt remembers speaking w/me last week re: low cho choices/menu. Says he has been satisfied w/options available and kitchen has done well honoring his requests. Good appetite. Expresses some stress about leaving and doing abx at home.    Row Name 03/12/18 1110       Reason for Assessment    Reason For Assessment per organizational policy   LOS    Diagnosis infection/sepsis   infection to hardware in great toe          Electronically signed by:  Alesha Blood RD  03/12/18 11:18 AM

## 2018-03-12 NOTE — PROGRESS NOTES
TWO PATIENT IDENTIFIERS WERE USED. CONSENT WAS SIGNED PER PATIENT EDUCATION MATERIAL WAS GIVEN TO PATIENT AND / OR FAMILY. THE PATIENT WAS DRAPED WITH FULL BODY DRAPE AND PATIENT'S RIGHT ARM WAS PREPPED WITH CHLORAPREP.  ULTRASOUND WAS USED TO LOCALIZE THERIGHT BASILIC  VEIN. SUBCUTANEOUS TISSUE AT THE CATHETER SITE WAS INFILTRATED WITH 2% LIDOCAINE. UNDER ULTRASOUND GUIDANCE, THE VEIN WAS ACCESSED WITH A 21GAUGE  NEEDLE. AN 0.018 WIRE WAS THEN THREADED THROUGH THE NEEDLE INTO THE CENTRAL VENOUS SYSTEM. THE 21GAUGE  NEEDLE WAS REMOVED AND A 4 [place] PEEL AWAY SHEATH WAS PLACED OVER THE WIRE. THE PICC LINE CATHETER WAS CUT AT 44 CM. THE PICC LINE CATHETER WAS THEN PLACED OVER THE WIRE INTO THE VEIN, THE SHEATH WAS PEELED AWAY,WIRE WAS REMOVED. CATHETER WAS FLUSHED WITH NORMAL SALINE AND TIPS APPLIED. BIOPATCH PLACED. CATHETER SECURED WITH STATLOCK AND TEGADERM. PATIENT TOLERATED PROCEDURE WELL. THIS WAS DONE IN THE   ANGIOSUITE      IMPRESSION: SUCCESSFUL PLACEMENT OF SINGLE LUMEN SOLO PICC        Tuh Lugo  3/12/2018  2:22 PM

## 2018-03-12 NOTE — PROGRESS NOTES
"Pharmacokinetics by Pharmacy - Vancomycin    Emre Lisa is a 57 y.o. male receiving vancomycin 1750mg IV Q12 hours day 8 for osteomyelitis.    Objective:  [Ht: 193 cm (75.98\"); Wt: 131 kg (288 lb)]     Lab Results   Component Value Date    WBC 7.38 03/12/2018    WBC 6.17 03/11/2018    WBC 5.96 03/10/2018      Lab Results   Component Value Date    CRP 6.2 (H) 03/11/2016    LACTATE 1.5 03/11/2016      Temp Readings from Last 1 Encounters:   03/12/18 98 °F (36.7 °C)     Estimated Creatinine Clearance: 133.5 mL/min (by C-G formula based on SCr of 0.92 mg/dL).   Lab Results   Component Value Date    CREATININE 0.92 03/12/2018    CREATININE 0.99 03/11/2018    CREATININE 0.91 03/10/2018       Lab Results   Component Value Date    VANCOTROUGH 16.53 (H) 03/12/2018    VANCOTROUGH 21.66 (H) 03/10/2018       Culture Results:  Microbiology Results (last 10 days)       Procedure Component Value - Date/Time    Tissue / Bone Culture - Tissue, Toe, Left [[id number]]  (Normal) Collected:  03/07/18 1313    Lab Status:  Final result Specimen:  Tissue from Toe, Left Updated:  03/12/18 0606     Tissue Culture No growth at 5 days     Gram Stain Result Rare (1+) WBCs seen      No organisms seen    Narrative:       NO ANAEROBES ISOLATED    Tissue / Bone Culture - Tissue, Toe, Left [[id number]]  (Abnormal)  (Susceptibility) Collected:  03/07/18 1313    Lab Status:  Final result Specimen:  Tissue from Toe, Left Updated:  03/11/18 0631     Tissue Culture --      Light growth (2+) Staphylococcus intermedius (A)      Light growth (2+) Staphylococcus warneri (A)     Comment: different colony type          Gram Stain Result Rare (1+) WBCs seen      No organisms seen    Narrative:       Slide reviewed confirmed  NO ANAEROBES ISOLATED    Susceptibility        Staphylococcus intermedius    Staphylococcus warneri       MARTINA    MARTINA       Clindamycin >4 ug/ml Resistant    <=0.5 ug/ml Susceptible       Erythromycin >4 ug/ml Resistant    4 ug/ml " Intermediate       Gentamicin <=4 ug/ml Susceptible    <=4 ug/ml Susceptible       Oxacillin 1 ug/ml Resistant    0.5 ug/ml Resistant       Penicillin G 2 ug/ml Resistant    2 ug/ml Resistant       Tetracycline <=4 ug/ml Susceptible    <=4 ug/ml Susceptible       Trimethoprim + Sulfamethoxazole <=0.5/9.5 ug/ml Susceptible    <=0.5/9.5 ug/ml Susceptible       Vancomycin 0.5 ug/ml Susceptible    1 ug/ml Susceptible                        Hardware / Foreign Body Culture - Hardware / Foreign Body, Foot, Left [[id number]]  (Abnormal) Collected:  03/05/18 2127    Lab Status:  Final result Specimen:  Hardware / Foreign Body from Foot, Left Updated:  03/08/18 0716     Hardware / Foreign Body Culture Culture in progress      Light growth (2+) Staphylococcus, coagulase negative (A)     Comment: Susceptibility not indicated          Gram Stain Result Rare (1+) WBCs seen      No No organisms seen    Tissue / Bone Culture - Bone, Foot, Left [[id number]]  (Abnormal)  (Susceptibility) Collected:  03/05/18 1804    Lab Status:  Final result Specimen:  Bone from Foot, Left Updated:  03/09/18 1001     Tissue Culture --      Moderate growth (3+) Staphylococcus lugdunensis (A)     Comment: Corrected result. Previously Reported Organism Changed. Previous result was Staphylococcus, coagulase negative on 3/7/2018 at 0839 CST.        Gram Stain Result Few (2+) WBCs seen      Few (2+) Gram positive cocci in pairs    Narrative:       no anaerobes isolated    Susceptibility        Staphylococcus lugdunensis     MARTINA     Clindamycin >4 ug/ml Resistant     Levofloxacin <=1 ug/ml Susceptible  [1]      Oxacillin 1 ug/ml Susceptible     Penicillin G 0.12 ug/ml Susceptible     Tetracycline <=4 ug/ml Susceptible     Trimethoprim + Sulfamethoxazole <=0.5/9.5 ug/ml Susceptible     Vancomycin 1 ug/ml Susceptible              [1]   Staphylococcus species may develop resistance during prolonged therapy with quinolones.  Isolates that are initially  susceptible may become resistant within three to four days after initiation of therapy. Testing of repeat isolates may be warranted.                   Anaerobic Culture - Wound, Foot, Left [[id number]]  (Normal) Collected:  03/05/18 1751    Lab Status:  Final result Specimen:  Wound from Foot, Left Updated:  03/07/18 0838     Culture No anaerobes isolated     Gram Stain Result Rare (1+) WBCs seen      No organisms seen    Wound Culture - Wound, Foot, Left [[id number]]  (Abnormal) Collected:  03/05/18 1751    Lab Status:  Final result Specimen:  Wound from Foot, Left Updated:  03/08/18 0710     Wound Culture --      Moderate growth (3+) Staphylococcus, coagulase negative (A)     Comment: Susceptibility not indicated          Gram Stain Result Rare (1+) Gram positive cocci in pairs      Few (2+) WBCs seen                 Assessment:  Vancomycin is the appropriate antibiotic for the patient's osteomyelitis.  Linezolid should be reserved as second line due to potential side effects with prolonged usage (peripheral neuropathy, bone marrow suppression, etc.).  Trough level today was at  goal.  Renal function remains stable, SCr is unchanged and  urine output is also stable.      Plan:  1. Continue current vancomycin dosage.  2. Will order vancomycin trough when clinically appropriate.  3. Pharmacy will monitor renal function and adjust dose accordingly.      Sandor Meléndez III, [facility]   03/12/18 10:17 AM

## 2018-03-12 NOTE — PLAN OF CARE
Problem: Patient Care Overview  Goal: Plan of Care Review  Outcome: Ongoing (interventions implemented as appropriate)   03/12/18 8987   Coping/Psychosocial   Plan of Care Reviewed With patient   Plan of Care Review   Progress improving   OTHER   Outcome Summary iv abx. awaiting plan for d/c- unable to go home. vss. no c/o pain.        Problem: Pain, Acute (Adult)  Goal: Acceptable Pain Control/Comfort Level  Outcome: Ongoing (interventions implemented as appropriate)

## 2018-03-12 NOTE — PROGRESS NOTES
[facility] Medicine [facility]  INPATIENT PROGRESS NOTE    Length of Stay: 7  Date of Admission: 3/5/2018  Primary Care Physician: Jamaal Bravo MD    Subjective   Please note that all previous progress notes, medication changes, lab findings, Radiographical changes, and physical exam findings have been noted and updated as appropriate.    3/12/2018: No complaints, fever, chills, nausea, vomiting.  Have discussed with patient his options.  Will check with homecare companies as well as long-term acute care.    3/11/2018: Continues to have no complaints.  Continues to grow staph species, multiple colonies.  All are sensitive to vancomycin.  Secondary to sensitivity to vancomycin, are also sensitive to Zyvox.  Have discussed with pharmacy.  Zyvox is indicated for osteomyelitis, however there are multiple side effects to using Zyvox for a prolonged period of time.  We will have to discuss risk and benefits with patient and furthermore with pharmacy as to whether or not we will continue with vancomycin versus Zyvox.  Vancomycin changed to every 12 hours from every 8 hours.    3/10/2018: Patient has no pain or complaints.  Did undergo further operative washout procedure stay.  Dr. Thompson states that the patient foot looks much better and that he is cleared from podiatry point view, but still needs at least 2-4 weeks of IV antibiotics, vancomycin, followed by oral antibiotics for another 2-4 weeks.  At this time or he with case management to get approval for IV antibiotics outpatient.  At this time patient has vancomycin every 8 hours, patient may require LTAC or acute rehabilitation.    3/9/2018: Patient has no complaints.  Dr. Thompson has recommended magic butt cream for moisturization of right foot.  No fever or chills, no nausea or vomiting.  Will have a follow-up OR procedure tomorrow with Dr. Thompson.    Chief Complaint/HPI:  This 57 year old male was admitted to hospitalist  services secondary to left foot cellulitis.  Patient has also been evaluated and treated by Dr. Thompson of podiatry.  Patient has undergone irrigation and debridement of the left foot.  Patient currently growing coagulase-negative staph.  Though this strain is predictably sensitive to penicillin and clindamycin, per medical literature vancomycin is the preferred drug of choice for osteomyelitis for this group.  All other antibiotics have been de-escalated.  Patient has no pain.  He is scheduled to return to the operating suite within 48 hours for another washout and further treatment.    Review of Systems   Constitutional: Negative for chills and fever.   Respiratory: Negative for shortness of breath.    Cardiovascular: Negative for chest pain.   Gastrointestinal: Negative for abdominal pain, nausea and vomiting.      All pertinent negatives and positives are as above. All other systems have been reviewed and are negative unless otherwise stated.     Objective    Temp:  [97 °F (36.1 °C)-98.6 °F (37 °C)] 98 °F (36.7 °C)  Heart Rate:  [67-76] 71  Resp:  [18] 18  BP: (100-124)/(68-78) 100/68    Physical Exam   Constitutional: He is oriented to person, place, and time. He appears well-developed and well-nourished. No distress.   HENT:   Head: Normocephalic and atraumatic.   Cardiovascular: Normal rate and regular rhythm.    Pulmonary/Chest: Effort normal and breath sounds normal. No respiratory distress. He has no wheezes.   Neurological: He is alert and oriented to person, place, and time.   Skin: Skin is dry. He is not diaphoretic.   Psychiatric: He has a normal mood and affect. His behavior is normal.     Results Review:  I have reviewed the labs, radiology results, and diagnostic studies.    Laboratory Data:     Results from last 7 days  Lab Units 03/12/18  0548 03/11/18  0607 03/10/18  0559   SODIUM mmol/L 139 139 141   POTASSIUM mmol/L 4.1 4.2 4.3   CHLORIDE mmol/L 98 101 103   CO2 mmol/L 28.0 27.0 25.0   BUN mg/dL  23* 21 20   CREATININE mg/dL 0.92 0.99 0.91   GLUCOSE mg/dL 114* 99 96   CALCIUM mg/dL 9.7 9.0 8.6   BILIRUBIN mg/dL 0.5 0.2 0.2   ALK PHOS U/L 62 51 50   ALT (SGPT) U/L 102* 103* 129*   AST (SGOT) U/L 43 54 87*   ANION GAP mmol/L 13.0 11.0 13.0     Estimated Creatinine Clearance: 133.5 mL/min (by C-G formula based on SCr of 0.92 mg/dL).            Results from last 7 days  Lab Units 03/12/18  0548 03/11/18  0607 03/10/18  0559 03/09/18  0818 03/08/18  0505   WBC 10*3/mm3 7.38 6.17 5.96 5.08 5.28   HEMOGLOBIN g/dL 14.7 12.9* 13.2* 13.7 13.7   HEMATOCRIT % 43.9 39.0 39.0 40.6 40.8   PLATELETS 10*3/mm3 203 176 174 197 179           Culture Data:   No results found for: BLOODCX  No results found for: URINECX  No results found for: RESPCX  No results found for: WOUNDCX  No results found for: STOOLCX  No components found for: BODYFLD    Radiology Data:   Imaging Results (last 24 hours)     ** No results found for the last 24 hours. **          I have reviewed the patient current medications.     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                This document has been electronically signed by BRYAN Ocampo on March 12, 2018 10:52 AM

## 2018-03-12 NOTE — PROGRESS NOTES
"   LOS: 7 days   Patient Care Team:  Jamaal Bravo MD as PCP - General  Marco A Thompson DPM as Consulting Physician (Podiatry)    Chief Complaint: left foot infection    Subjective   Patient sitting up in a chair. NAD. Denies pain.     History of Present Illness    Subjective    History taken from: patient    Objective     Vital Signs  Temp:  [97 °F (36.1 °C)-98.6 °F (37 °C)] 98 °F (36.7 °C)  Heart Rate:  [67-76] 71  Resp:  [18] 18  BP: (113-124)/(70-78) 124/74    Objective:  Vital signs: (most recent): Blood pressure 124/74, pulse 71, temperature 98 °F (36.7 °C), resp. rate 18, height 193 cm (75.98\"), weight 131 kg (288 lb), SpO2 97 %.               Constitutional: well developed, well nourished    HEENT: Normocephalic and atraumatic, normal hearing    Respiratory: Non labored respirations noted    LLE: Dressing is clean, dry and intact.       Psychiatric: A&O x 3 with normal mood and affect. NAD.     Results Review:     I reviewed the patient's new clinical results.      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     Marco A Thompson DPM  03/12/18  8:02 AM      "

## 2018-03-12 NOTE — PLAN OF CARE
Problem: Patient Care Overview  Goal: Plan of Care Review  Outcome: Ongoing (interventions implemented as appropriate)   03/12/18 0302   Coping/Psychosocial   Plan of Care Reviewed With patient   Plan of Care Review   Progress no change   OTHER   Outcome Summary Pt has had no c/o pain. VSS. No new concerns

## 2018-03-13 ENCOUNTER — INFUSION (OUTPATIENT)
Dept: PEDIATRICS | Facility: HOSPITAL | Age: 58
End: 2018-03-13

## 2018-03-13 VITALS
WEIGHT: 288 LBS | DIASTOLIC BLOOD PRESSURE: 71 MMHG | OXYGEN SATURATION: 98 % | BODY MASS INDEX: 35.07 KG/M2 | HEIGHT: 76 IN | HEART RATE: 82 BPM | TEMPERATURE: 97 F | SYSTOLIC BLOOD PRESSURE: 115 MMHG | RESPIRATION RATE: 18 BRPM

## 2018-03-13 VITALS
BODY MASS INDEX: 35.31 KG/M2 | DIASTOLIC BLOOD PRESSURE: 69 MMHG | SYSTOLIC BLOOD PRESSURE: 111 MMHG | OXYGEN SATURATION: 99 % | WEIGHT: 290 LBS | HEART RATE: 74 BPM | HEIGHT: 76 IN | RESPIRATION RATE: 18 BRPM | TEMPERATURE: 98 F

## 2018-03-13 DIAGNOSIS — M86.372 CHRONIC MULTIFOCAL OSTEOMYELITIS OF LEFT FOOT (HCC): Primary | ICD-10-CM

## 2018-03-13 LAB
ALBUMIN SERPL-MCNC: 3.7 G/DL (ref 3.4–4.8)
ALBUMIN/GLOB SERPL: 1 G/DL (ref 1.1–1.8)
ALP SERPL-CCNC: 54 U/L (ref 38–126)
ALT SERPL W P-5'-P-CCNC: 73 U/L (ref 21–72)
ANION GAP SERPL CALCULATED.3IONS-SCNC: 11 MMOL/L (ref 5–15)
AST SERPL-CCNC: 31 U/L (ref 17–59)
BASOPHILS # BLD AUTO: 0.02 10*3/MM3 (ref 0–0.2)
BASOPHILS NFR BLD AUTO: 0.3 % (ref 0–2)
BILIRUB SERPL-MCNC: 0.3 MG/DL (ref 0.2–1.3)
BUN BLD-MCNC: 29 MG/DL (ref 7–21)
BUN/CREAT SERPL: 31.2 (ref 7–25)
CALCIUM SPEC-SCNC: 9.2 MG/DL (ref 8.4–10.2)
CHLORIDE SERPL-SCNC: 100 MMOL/L (ref 95–110)
CO2 SERPL-SCNC: 27 MMOL/L (ref 22–31)
CREAT BLD-MCNC: 0.93 MG/DL (ref 0.7–1.3)
DEPRECATED RDW RBC AUTO: 41 FL (ref 35.1–43.9)
EOSINOPHIL # BLD AUTO: 0.13 10*3/MM3 (ref 0–0.7)
EOSINOPHIL NFR BLD AUTO: 1.8 % (ref 0–7)
ERYTHROCYTE [DISTWIDTH] IN BLOOD BY AUTOMATED COUNT: 12.9 % (ref 11.5–14.5)
GFR SERPL CREATININE-BSD FRML MDRD: 84 ML/MIN/1.73 (ref 60–130)
GLOBULIN UR ELPH-MCNC: 3.6 GM/DL (ref 2.3–3.5)
GLUCOSE BLD-MCNC: 110 MG/DL (ref 60–100)
GLUCOSE BLDC GLUCOMTR-MCNC: 102 MG/DL (ref 70–130)
GLUCOSE BLDC GLUCOMTR-MCNC: 127 MG/DL (ref 70–130)
HCT VFR BLD AUTO: 41.3 % (ref 39–49)
HGB BLD-MCNC: 14 G/DL (ref 13.7–17.3)
IMM GRANULOCYTES # BLD: 0.04 10*3/MM3 (ref 0–0.02)
IMM GRANULOCYTES NFR BLD: 0.6 % (ref 0–0.5)
LYMPHOCYTES # BLD AUTO: 2.2 10*3/MM3 (ref 0.6–4.2)
LYMPHOCYTES NFR BLD AUTO: 30.4 % (ref 10–50)
MCH RBC QN AUTO: 29.4 PG (ref 26.5–34)
MCHC RBC AUTO-ENTMCNC: 33.9 G/DL (ref 31.5–36.3)
MCV RBC AUTO: 86.8 FL (ref 80–98)
MONOCYTES # BLD AUTO: 0.92 10*3/MM3 (ref 0–0.9)
MONOCYTES NFR BLD AUTO: 12.7 % (ref 0–12)
NEUTROPHILS # BLD AUTO: 3.93 10*3/MM3 (ref 2–8.6)
NEUTROPHILS NFR BLD AUTO: 54.2 % (ref 37–80)
PLATELET # BLD AUTO: 164 10*3/MM3 (ref 150–450)
PMV BLD AUTO: 10.2 FL (ref 8–12)
POTASSIUM BLD-SCNC: 4.2 MMOL/L (ref 3.5–5.1)
PROT SERPL-MCNC: 7.3 G/DL (ref 6.3–8.6)
RBC # BLD AUTO: 4.76 10*6/MM3 (ref 4.37–5.74)
SODIUM BLD-SCNC: 138 MMOL/L (ref 137–145)
WBC NRBC COR # BLD: 7.24 10*3/MM3 (ref 3.2–9.8)

## 2018-03-13 PROCEDURE — 25010000002 VANCOMYCIN PER 500 MG: Performed by: PODIATRIST

## 2018-03-13 PROCEDURE — 82962 GLUCOSE BLOOD TEST: CPT

## 2018-03-13 PROCEDURE — 97530 THERAPEUTIC ACTIVITIES: CPT

## 2018-03-13 PROCEDURE — 96365 THER/PROPH/DIAG IV INF INIT: CPT

## 2018-03-13 PROCEDURE — G8979 MOBILITY GOAL STATUS: HCPCS

## 2018-03-13 PROCEDURE — G8980 MOBILITY D/C STATUS: HCPCS

## 2018-03-13 PROCEDURE — 97116 GAIT TRAINING THERAPY: CPT

## 2018-03-13 PROCEDURE — 97162 PT EVAL MOD COMPLEX 30 MIN: CPT

## 2018-03-13 PROCEDURE — G8978 MOBILITY CURRENT STATUS: HCPCS

## 2018-03-13 PROCEDURE — 80053 COMPREHEN METABOLIC PANEL: CPT | Performed by: NURSE PRACTITIONER

## 2018-03-13 PROCEDURE — 96366 THER/PROPH/DIAG IV INF ADDON: CPT

## 2018-03-13 PROCEDURE — 85025 COMPLETE CBC W/AUTO DIFF WBC: CPT | Performed by: NURSE PRACTITIONER

## 2018-03-13 RX ADMIN — VANCOMYCIN HYDROCHLORIDE 1750 MG: 5 INJECTION, POWDER, LYOPHILIZED, FOR SOLUTION INTRAVENOUS at 20:11

## 2018-03-13 RX ADMIN — MAGNESIUM OXIDE TAB 400 MG (241.3 MG ELEMENTAL MG) 400 MG: 400 (241.3 MG) TAB at 08:04

## 2018-03-13 RX ADMIN — GLIPIZIDE 5 MG: 5 TABLET ORAL at 08:04

## 2018-03-13 RX ADMIN — VANCOMYCIN HYDROCHLORIDE 1750 MG: 5 INJECTION, POWDER, LYOPHILIZED, FOR SOLUTION INTRAVENOUS at 10:15

## 2018-03-13 RX ADMIN — HYDROCORTISONE: 1 CREAM TOPICAL at 10:15

## 2018-03-13 NOTE — PLAN OF CARE
Problem: Patient Care Overview  Goal: Plan of Care Review   03/13/18 0155   Coping/Psychosocial   Plan of Care Reviewed With patient   Plan of Care Review   Progress improving   OTHER   Outcome Summary No complaints of pain, VSS     Goal: Individualization and Mutuality  Outcome: Ongoing (interventions implemented as appropriate)

## 2018-03-13 NOTE — PLAN OF CARE
Problem: Patient Care Overview  Goal: Plan of Care Review  Outcome: Outcome(s) achieved Date Met: 03/13/18 03/13/18 9377   Coping/Psychosocial   Plan of Care Reviewed With patient   OTHER   Outcome Summary Initial PT evaluation complete. Patient is alert and cooperative. Patient demonstrates (I) with bed mobility, transfers and gait. He ambulates 150'x1 without an AD, does wear R orthotic shoe (secondary to previous amputation) and L surgical shoe; also demonstrates stooped posture secondary to chronic arthritis. Tinetti assessed: 25/28 or low fall risk. No further PT indicated at this time. Patient discharge is planned for today.

## 2018-03-13 NOTE — DISCHARGE SUMMARY
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, initial encounter [T84.7XXA]  Cellulitis of left foot [L03.116]  Cellulitis of left foot [L03.116]  Cellulitis of left foot [L03.116]  Infected hardware in left lower extremity, subsequent encounter [T84.7XXD]  Cellulitis of left foot [L03.116]  Infected hardware in left lower extremity, subsequent encounter [T84.7XXD]       Final Discharge Diagnoses:  Hospital Problem List     Type 2 diabetes mellitus with skin complication    Cellulitis of left foot    Overview Signed 3/5/2018  1:17 PM by Marco A Thompson, DPM     Added automatically from request for surgery [id number]         Infected hardware in left leg    Overview Signed 3/5/2018  1:17 PM by Marco A Thompson, DPM     Added automatically from request for surgery [id number]               Consults:   Consults     No orders found from 2/4/2018 to 3/6/2018.          Procedures Performed: Procedure(s):  FOOT IRRIGATION, DEBRIDEMENT AND REPAIR              Pertinent Test Results:     Lab Results (last 24 hours)     Procedure Component Value Units Date/Time    POC Glucose Once [[id number]]  (Normal) Collected:  03/13/18 0742    Specimen:  Blood Updated:  03/13/18 0809     Glucose 102 mg/dL      Comment: Meter: [id number]: [id number] [facility]       Comprehensive Metabolic Panel [[id number]]  (Abnormal) Collected:  03/13/18 0520    Specimen:  Blood Updated:  03/13/18 0611     Glucose 110 (H) mg/dL      BUN 29 (H) mg/dL      Creatinine 0.93 mg/dL      Sodium 138 mmol/L      Potassium 4.2 mmol/L      Chloride 100 mmol/L      CO2 27.0 mmol/L      Calcium 9.2 mg/dL      Total Protein 7.3 g/dL      Albumin 3.70 g/dL      ALT (SGPT) 73 (H) U/L       AST (SGOT) 31 U/L      Alkaline Phosphatase 54 U/L      Total Bilirubin 0.3 mg/dL      eGFR Non African Amer 84 mL/min/1.73      Globulin 3.6 (H) gm/dL      A/G Ratio 1.0 (L) g/dL      BUN/Creatinine Ratio 31.2 (H)     Anion Gap 11.0 mmol/L     CBC & Differential [[id number]] Collected:  03/13/18 0520    Specimen:  Blood Updated:  03/13/18 0552    Narrative:       The following orders were created for panel order CBC & Differential.  Procedure                               Abnormality         Status                     ---------                               -----------         ------                     CBC Auto Differential[[id number]]        Abnormal            Final result                 Please view results for these tests on the individual orders.    CBC Auto Differential [[id number]]  (Abnormal) Collected:  03/13/18 0520    Specimen:  Blood Updated:  03/13/18 0552     WBC 7.24 10*3/mm3      RBC 4.76 10*6/mm3      Hemoglobin 14.0 g/dL      Hematocrit 41.3 %      MCV 86.8 fL      MCH 29.4 pg      MCHC 33.9 g/dL      RDW 12.9 %      RDW-SD 41.0 fl      MPV 10.2 fL      Platelets 164 10*3/mm3      Neutrophil % 54.2 %      Lymphocyte % 30.4 %      Monocyte % 12.7 (H) %      Eosinophil % 1.8 %      Basophil % 0.3 %      Immature Grans % 0.6 (H) %      Neutrophils, Absolute 3.93 10*3/mm3      Lymphocytes, Absolute 2.20 10*3/mm3      Monocytes, Absolute 0.92 (H) 10*3/mm3      Eosinophils, Absolute 0.13 10*3/mm3      Basophils, Absolute 0.02 10*3/mm3      Immature Grans, Absolute 0.04 (H) 10*3/mm3     POC Glucose Once [[id number]]  (Normal) Collected:  03/12/18 2014    Specimen:  Blood Updated:  03/12/18 2149     Glucose 114 mg/dL      Comment: RN NotifiedMeter: [id number]: [id number] WARNER BYRNES       POC Glucose Once [[id number]]  (Normal) Collected:  03/12/18 1625    Specimen:  Blood Updated:  03/12/18 1716     Glucose 86 mg/dL      Comment: Meter: [id number]: [id number] [facility]              Imaging Results (last 72 hours)     Procedure Component Value Units Date/Time    US Guided Vascular Access [[id number]] Resulted:  03/12/18 1438     Updated:  03/12/18 1438    Narrative:       This procedure was auto-finalized with no dictation required.    XR Chest Post CVA Port [[id number]] Collected:  03/12/18 1412     Updated:  03/12/18 1429    Narrative:       Chest single view.    CLINICAL INDICATION: PICC line placement.    COMPARISON: Chest November 19, 2014.    FINDINGS: Cardiac silhouette is normal in size. Pulmonary  vascularity is unremarkable.     Pacemaker, AICD leads in right atrium and right ventricle. Lungs  grossly clear.      Impression:       CONCLUSION: Right arm PICC line catheter. Catheter tip in  superior vena cava at junction with right atrium in satisfactory  position.    Electronically signed by:  Gatito Cr MD  3/12/2018 2:27 PM CDT  Workstation: [id number]    IR PICC wo fluoro guidance [[id number]] Resulted:  03/12/18 1423     Updated:  03/12/18 1423    Narrative:       This procedure was auto-finalized with no dictation required.        Hospital Course:  The patient is a 57 y.o. male who presented to [facility] with Left foot cellulitis.  Patient was evaluated by Dr. Thompson of podiatry.  Patient underwent irrigation and debridement and washout.  Patient was growing multiple staph colonies that were sensitive to vancomycin.  Vancomycin is the preferred drug of choice for osteomyelitis for this group of staph.  Patient was cleared for discharge by Dr. Thompson and 4 weeks of IV antibiotics were prescribed.  Patient refused long-term acute care hospital referral, stated that no matter what he would use the infusion center.  It was explained to the patient that given vancomycin troughs, his vancomycin could return to every 8 hours or even every 6 hours.  Patient reported no matter what he would be compliant and stay at the infusion center if necessary.  Patient left with PICC  "line, vancomycin orders, infusion center has accepted the patient.  No fever, chills, pain.  Patient will follow up Dr. Thompson as well as his primary care provider.  Instructed to return with any worsening symptoms including fever, chills, numbness, tingling, any pustular drainage or any other concerning symptoms.      Condition on Discharge:  Stable, improved    Physical Exam on Discharge:  /71 (BP Location: Left arm, Patient Position: Lying)   Pulse 82   Temp 97 °F (36.1 °C) (Oral)   Resp 18   Ht 193 cm (75.98\")   Wt 131 kg (288 lb)   SpO2 98%   BMI 35.07 kg/m²   Physical Exam   Constitutional: He is oriented to person, place, and time. He appears well-developed and well-nourished. No distress.   HENT:   Head: Normocephalic and atraumatic.   Cardiovascular: Normal rate and regular rhythm.    Pulmonary/Chest: Effort normal and breath sounds normal. No respiratory distress. He has no wheezes.   Abdominal: Soft. Bowel sounds are normal. He exhibits no distension. There is no tenderness.   Neurological: He is alert and oriented to person, place, and time.   Skin: Skin is warm and dry. He is not diaphoretic.   Psychiatric: He has a normal mood and affect. His behavior is normal.     Discharge Disposition:  Home or Self Care    Discharge Medications:   Emre Lisa   Home Medication Instructions JANA:[id number]    Printed on:03/13/18 7735   Medication Information                      aspirin 81 MG chewable tablet  Chew 81 mg Every Night.             Bacitracin Zinc (MAGIC BUTT OINTMENT)  Apply 1 each topically 2 (Two) Times a Day.             Cholecalciferol (VITAMIN D3) 5000 UNITS capsule capsule  Take 5,000 Units by mouth Every Night.             Cyanocobalamin (VITAMIN B 12 PO)  Take 1,000 mcg by mouth Every Night.             dronedarone (MULTAQ) 400 MG tablet  Take 400 mg by mouth Every Night.             fluticasone (FLONASE) 50 MCG/ACT nasal spray  2 sprays into each nostril As Needed for " Rhinitis.             glimepiride (AMARYL) 2 MG tablet  Take 4 mg by mouth 2 (Two) Times a Day.             HYDROcodone-acetaminophen (NORCO) 7.5-325 MG per tablet  Take 1 tablet by mouth Every 6 (Six) Hours As Needed for Moderate Pain .             magnesium oxide (MAGOX) 400 (241.3 Mg) MG tablet tablet  Take 400 mg by mouth 2 (Two) Times a Day.             metFORMIN (GLUCOPHAGE) 1000 MG tablet  Take 1,000 mg by mouth 2 (Two) Times a Day With Meals. Patient states he only uses when needed             metoprolol tartrate (LOPRESSOR) 25 MG tablet  Take 12.5 mg by mouth Every Night.             ONE TOUCH ULTRA TEST test strip  USE TO TEST BLOOD SUGAR THREE TIMES A DAY             valsartan (DIOVAN) 160 MG tablet  Take 40 mg by mouth Every Night. 1/2 tablet daily              vancomycin 1,750 mg in sodium chloride 0.9 % 500 mL IVPB  Infuse 1,750 mg into a venous catheter Every 12 (Twelve) Hours for 66 doses.             Vancomycin HCl in Dextrose (PHARMACY TO DOSE VANCOMYCIN)  Continuous As Needed (pharmacy to dose vancomycin begining with second dose) for up to 33 days.             vitamin C (ASCORBIC ACID) 500 MG tablet  Take 500 mg by mouth Every Night.             vitamin E 200 UNIT capsule  Take 200 Units by mouth Every Night.                 Discharge Diet:   Diet Instructions     Diet: Consistent Carbohydrate, Cardiac; Thin       Discharge Diet:   Consistent Carbohydrate  Cardiac       Fluid Consistency:  Thin          Activity at Discharge:   Activity Instructions     Discharge Activity Restrictions       Per Dr. Thompson instructions          Discharge Care Plan/Instructions: As noted above.    Follow-up Appointments:   Future Appointments  Date Time Provider Department Center   3/13/2018 8:00 PM OP INF 1 MAD OBV [facility]   3/14/2018 8:00 AM OP INF 1 MAD OBV [facility]   3/14/2018 8:00 PM OP INF 1 MAD OBV [facility]   3/15/2018 8:00 AM OP INF 1 MAD OBV [facility]   3/15/2018 8:00 PM OP INF 1 MAD OBV   MAD [facility]   3/16/2018 8:00 AM OP INF 1 MAD OBV [facility]   3/16/2018 8:00 PM OP INF 1 MAD OBV [facility]   3/17/2018 8:00 AM OP INF 1 MAD OBV [facility]   3/17/2018 8:00 PM OP INF 1 MAD OBV [facility]   3/18/2018 8:00 AM OP INF 1 MAD OBV [facility]   3/18/2018 8:00 PM OP INF 1 MAD OBV [facility]   3/19/2018 8:00 AM OP INF 1 MAD OBV [facility]   3/19/2018 8:00 PM OP INF 1 MAD OBV [facility]   3/20/2018 8:00 AM OP INF 1 MAD OBV [facility]   3/20/2018 8:00 PM OP INF 1 MAD OBV [facility]   3/21/2018 8:00 AM OP INF 1 MAD OBV [facility]   3/21/2018 8:00 PM OP INF 1 MAD OBV [facility]   3/22/2018 8:00 AM OP INF 1 MAD OBV [facility]   3/22/2018 8:00 PM OP INF 1 MAD OBV [facility]   3/23/2018 8:00 AM OP INF 1 MAD OBV [facility]   3/23/2018 8:00 PM OP INF 1 MAD OBV [facility]   3/24/2018 8:00 AM OP INF 1 MAD OBV [facility]   3/24/2018 8:00 PM OP INF 1 MAD OBV [facility]   3/25/2018 8:00 AM OP INF 1 MAD OBV [facility]   3/25/2018 8:00 PM OP INF 1 MAD OBV [facility]   3/26/2018 8:00 AM OP INF 1 MAD OBV [facility]   3/26/2018 8:00 PM OP INF 1 MAD OBV [facility]   3/27/2018 8:00 AM OP INF 1 MAD OBV [facility]   3/27/2018 8:00 PM OP INF 1 MAD OBV [facility]   3/28/2018 8:00 AM OP INF 1 MAD OBV [facility]   3/28/2018 8:00 PM OP INF 1 MAD OBV [facility]   3/29/2018 8:00 AM OP INF 1 MAD OBV [facility]   3/29/2018 8:00 PM OP INF 1 MAD OBV [facility]   3/30/2018 8:00 AM OP INF 1 MAD OBV [facility]   3/30/2018 8:00 PM OP INF 1 MAD OBV [facility]   3/31/2018 8:00 AM OP INF 1 MAD OBV [facility]   3/31/2018 8:00 PM OP INF 1 MAD OBV [facility]   4/1/2018 8:00 AM OP INF 1 MAD OBV [facility]   4/1/2018 8:00 PM OP INF 1 MAD OBV [facility]   4/2/2018 8:00 AM OP INF 1 MAD OBV [facility]   4/2/2018 8:00 PM OP INF 1 MAD OBV [facility]   4/3/2018 8:00 AM OP INF 1 MAD OBV [facility]   4/3/2018 8:00 PM OP INF 1 MAD OBV [facility]    4/4/2018 8:00 AM OP INF 1 MAD OBV  MAD [facility]   4/4/2018 8:00 PM OP INF 1 MAD OBV [facility]   4/5/2018 8:00 AM OP INF 1 MAD OBV [facility]   4/5/2018 8:00 PM OP INF 1 MAD OBV [facility]   4/6/2018 8:00 AM OP INF 1 MAD OBV [facility]   4/6/2018 8:00 PM OP INF 1 MAD OBV [facility]   4/7/2018 8:00 AM OP INF 1 MAD OBV [facility]   4/7/2018 8:00 PM OP INF 1 MAD OBV [facility]   4/8/2018 8:00 AM OP INF 1 MAD OBV [facility]   4/8/2018 8:00 PM OP INF 1 MAD OBV [facility]   4/9/2018 8:00 AM OP INF 1 MAD OBV [facility]   4/9/2018 8:00 PM OP INF 1 MAD OBV [facility]   4/10/2018 8:00 AM OP INF 1 MAD OBV [facility]           This document has been electronically signed by BRYAN Ocampo on March 13, 2018 2:00 PM        Time: Please note that greater than 30 minutes spent on the discharge planning in summary this patient.

## 2018-03-13 NOTE — DISCHARGE INSTR - APPOINTMENTS
3/13/18 07:15pm  IV Vancomycin q 12 hours for 4 weeks.  [facility]  Please arrive to ER for registration for outpatient infusion.   DR DILLARD MARCH 20,2018 AT 1:00  DR BUENO MARCH 20,2018 AT 2:15

## 2018-03-13 NOTE — THERAPY DISCHARGE NOTE
Acute Care - Physical Therapy Initial Eval/Discharge  [facility]     Patient Name: Emre Lisa  : 1960  MRN: [id number]  Today's Date: 3/13/2018   Onset of Illness/Injury or Date of Surgery: 18  Date of Referral to PT: 18  Referring Physician: BRYAN Munoz.      Admit Date: 3/5/2018    Visit Dx:    ICD-10-CM ICD-9-CM   1. Infected hardware in left lower extremity, subsequent encounter T84.7XXD V58.89     996.67   2. Cellulitis of left foot L03.116 682.7   3. Infected hardware in left lower extremity, initial encounter T84.7XXA 996.67   4. Impaired physical mobility Z74.09 781.99     Patient Active Problem List   Diagnosis   • Foot osteomyelitis, right   • Nodule of groin   • Type 2 diabetes mellitus with skin complication   • Status post transmetatarsal amputation of right foot   • Hammer toe of left foot   • Hallux malleus of left foot   • Cellulitis of left foot   • Infected hardware in left leg   • Chronic multifocal osteomyelitis of left foot     Past Medical History:   Diagnosis Date   • Arthritis    • Atrial fibrillation    • Diabetes mellitus    • Diabetic foot ulcer associated with type 2 diabetes mellitus     left great toe   • Hallux malleus of left foot    • Hammer toe    • Hypertension    • MI (myocardial infarction)    • Neuropathy in diabetes    • Onychomycosis      Past Surgical History:   Procedure Laterality Date   • AMPUTATION DIGIT Right 3/5/2017    Procedure: RIGHT AMPUTATION TRANSMETATRASAL , RECESSION GASTROCNEMOUS;  Surgeon: Marco A Thompson DPM;  Location: [facility];  Service:    • CARDIAC DEFIBRILLATOR PLACEMENT  ,    • CARPAL TUNNEL RELEASE      elbow and wrist left arm   • FOOT IRRIGATION, DEBRIDEMENT AND REPAIR Left 3/7/2018    Procedure: FOOT IRRIGATION, DEBRIDEMENT AND REPAIR;  Surgeon: Marco A Thompson DPM;  Location: [facility];  Service:    • FOOT SURGERY     • FOOT WOUND CLOSURE Right 3/2/2017    Procedure: INCISION AND DRAINAGE, RIGHT  FOOT;  Surgeon: Tung Rosenthal DPM;  Location: [facility] OR;  Service:    • HAMMER TOE REPAIR Left 2/15/2018    Procedure: HAMMERTOE CORRECTION LEFT SECOND, THIRD AND FOURTH TOES AND HALLUX INTERPHALANGEAL JOINT ARTHRODESIS LEFT FOOT (Micro Asnis, 4.0 & 5.0 Asnis)      (c-arm);  Surgeon: Marco A Thompson DPM;  Location: [facility] OR;  Service:    • HARDWARE REMOVAL Left 3/5/2018    Procedure: ANKLE/FOOT HARDWARE REMOVAL;  Surgeon: Marco A Thompson DPM;  Location: [facility] OR;  Service:    • INCISION AND DRAINAGE LEG Left 3/5/2018    Procedure: INCISION AND DRAINAGE LOWER EXTREMITY;  Surgeon: Marco A Thompson DPM;  Location: [facility] OR;  Service:    • TOE AMPUTATION Right 2016   • TONSILECTOMY, ADENOIDECTOMY, BILATERAL MYRINGOTOMY AND TUBES      age 25          PT ASSESSMENT (last 72 hours)      Physical Therapy Evaluation     Row Name 03/13/18 0920          PT Evaluation Time/Intention    Subjective Information no complaints  -CZ     Document Type evaluation  -CZ     Mode of Treatment individual therapy;physical therapy  -CZ     Total Evaluation Minutes, Physical Therapy 39  -CZ     Patient Effort excellent  -CZ     Symptoms Noted During/After Treatment none  -CZ     Row Name 03/13/18 0920          General Information    Patient Profile Reviewed? yes  -CZ     Onset of Illness/Injury or Date of Surgery 03/05/18  -CZ     Referring Physician BRYAN Munoz.  -CZ     Patient Observations alert;cooperative;agree to therapy  -CZ     General Observations of Patient Sitting EOB, bathing, no distress, RA.   -CZ     Prior Level of Function independent:;all household mobility;community mobility  -CZ     Equipment Currently Used at Home orthotic device   R ortho shoe secondary to previous amputation.  -CZ     Equipment Issued to Patient gait belt  -CZ     Row Name 03/13/18 0920          Relationship/Environment    Primary Source of Support/Comfort spouse  -CZ     Lives With spouse;child(ambrosio), dependent  -CZ     Row Name 03/13/18  0920          Resource/Environmental Concerns    Current Living Arrangements home/apartment/condo  -CZ     Row Name 03/13/18 0920          Home Main Entrance    Number of Stairs, Main Entrance four  -CZ     Surface of Stairs, Main Entrance concrete  -CZ     Stair Railings, Main Entrance none  -CZ     Row Name 03/13/18 0920          Cognitive Assessment/Intervention- PT/OT    Orientation Status (Cognition) oriented x 4  -CZ     Follows Commands (Cognition) WFL  -CZ     Row Name 03/13/18 0920          Bed Mobility Assessment/Treatment    Bed Mobility Assessment/Treatment supine-sit-supine  -CZ     Supine-Sit-Supine [place] (Bed Mobility) conditional independence  -CZ     Row Name 03/13/18 0920          Transfer Assessment/Treatment    Transfer Assessment/Treatment sit-stand transfer  -CZ     Maintains Weight-bearing Status (Transfers) able to maintain  -CZ     Comment (Transfers) WBAT with surgical shoe  -CZ     Sit-Stand [place] (Transfers) conditional independence  -CZ     Row Name 03/13/18 0920          Gait/Stairs Assessment/Training    Gait/Stairs Assessment/Training gait/ambulation independence  -CZ     [place] Level (Gait) conditional independence  -CZ     Assistive Device (Gait) --   No AD.  -CZ     Distance in Feet (Gait) 150  -CZ     Comment (Gait/Stairs) Stooped posture secondary chronic arthritis in C-spine and L- spine   -CZ     Row Name 03/13/18 0920          General ROM    GENERAL ROM COMMENTS BLEs WFL  -CZ     Row Name 03/13/18 0920          General Assessment (Manual Muscle Testing)    Comment, General Manual Muscle Testing (MMT) Assessment BLEs 4+/5 grossly  -CZ     Row Name 03/13/18 0920          Vision Assessment/Intervention    Visual Impairment/Limitations WFL  -CZ     Row Name 03/13/18 0920          Pain Assessment    Additional Documentation Pain Scale: Numbers Pre/Post-Treatment (Group)  -CZ     Row Name 03/13/18 0920          Pain Scale: Numbers Pre/Post-Treatment    Pain  Scale: Numbers, Pretreatment 0/10 - no pain  -CZ     Pain Scale: Numbers, Post-Treatment 0/10 - no pain  -CZ     Row Name             Wound 02/15/18 1017 Left foot surgical    Wound - Properties Group Date first assessed: 02/15/18  -BD Time first assessed: 1017  -BD Side: Left  -BD Location: foot  -BD Type: surgical  -BD Additional Comments: dressed  -BD    Row Name             [REMOVED] Wound 03/10/18 0718 Left foot surgical    Wound - Properties Group Date first assessed: 03/10/18  -AD Time first assessed: 0718  -AD Present On Admission : yes  -AD Side: Left  -AD Location: foot  -AD Type: surgical  -AD Resolution Date: 03/12/18  -OJ Resolution Time: 0920 -OJ Wound outcome: Other  -OJ    Row Name 03/13/18 0920          Physical Therapy Clinical Impression    Date of Referral to PT 03/12/18  -CZ     PT Diagnosis (PT Clinical Impression) impaired physical mobility  -CZ     Prognosis (PT Clinical Impression) good  -CZ     Criteria for Skilled Interventions Met (PT Clinical Impression) other (see comments);no;no problems identified which require skilled intervention  -CZ     Row Name 03/13/18 0920          Vital Signs    Pre Systolic BP Rehab 118  -CZ     Pre Treatment Diastolic BP 75  -CZ     Pretreatment Heart Rate (beats/min) 89  -CZ     Posttreatment Heart Rate (beats/min) 81  -CZ     Pre SpO2 (%) 97  -CZ     O2 Delivery Pre Treatment room air  -CZ     Post SpO2 (%) 98  -CZ     O2 Delivery Post Treatment room air  -CZ     Pre Patient Position Sitting  -CZ     Row Name 03/13/18 0920          Positioning and Restraints    Pre-Treatment Position in bed  -CZ     Post Treatment Position bed  -CZ     In Bed sitting EOB  -CZ     Row Name 03/13/18 0920          Living Environment    Home Accessibility stairs to enter home  -CZ       User Key  (r) = Recorded By, (t) = Taken By, (c) = Cosigned By    Initials Name Provider Type    OOMAYRA Mujica RN Registered Nurse    OLU Dumont RN Registered Nurse    MENDY CUTLER  "Gil RN Registered Nurse    CZ Lei Lopez, PT Physical Therapist              PT Recommendation and Plan  Therapy Frequency (PT Clinical Impression): evaluation only  Plan of Care Review  Plan of Care Reviewed With: patient  Outcome Summary: Initial PT evaluation complete. Patient is alert and cooperative.  Patient demonstrates (I) with bed mobility, transfers and gait.  He ambulates 150'x1 without an AD, does wear R orthotic shoe (secondary to previous amputation) and L surgical shoe; also demonstrates stooped posture secondary to chronic arthritis.  Tinetti assessed: 25/28 or low fall risk.  No further PT indicated at this time.  Patient  discharge is planned for today.   Plan of Care Reviewed With: patient          Outcome Measures     Row Name 03/13/18 0920             How much help from another person do you currently need...    Turning from your back to your side while in flat bed without using bedrails? 4  -CZ      Moving from lying on back to sitting on the side of a flat bed without bedrails? 4  -CZ      Moving to and from a bed to a chair (including a wheelchair)? 4  -CZ      Standing up from a chair using your arms (e.g., wheelchair, bedside chair)? 4  -CZ      Climbing 3-5 steps with a railing? 4  -CZ      To walk in hospital room? 4  -CZ      AM-PAC 6 Clicks Score 24  -CZ         Tinetti Assessment    Tinetti Assessment yes  -CZ      Sitting Balance 1  -CZ      Arises 2  -CZ      Attempts to Rise 2  -CZ      Immediate Standing Balance (first 5 sec) 2  -CZ      Standing Balance 1  -CZ      Sternal Nudge (feet close together) 1  -CZ      Eyes Closed (feet close together) 1  -CZ      Turning 360 Degrees- Steps 1  -CZ      Turning 360 Degrees- Steadiness 1  -CZ      Sitting Down 2  -CZ      Tinetti Balance Score 14  -CZ      Gait Initiation (immediate after told \"go\") 1  -CZ      Step Length- Right Swing 1  -CZ      Step Length- Left Swing 1  -CZ      Foot Clearance- Right Foot 1  -CZ      Foot " Clearance- Left Foot 1  -CZ      Step Symmetry 1  -CZ      Step Continuity 1  -CZ      Path (excursion) 1  -CZ      Trunk 2  -CZ      Base of Support 1  -CZ      Gait Score 11  -CZ      Tinetti Total Score 25  -CZ      Tinetti Assistive Device --   No AD.  -CZ         Functional Assessment    Outcome Measure Options Tinetti;AM-PAC 6 Clicks Basic Mobility (PT)  -CZ        User Key  (r) = Recorded By, (t) = Taken By, (c) = Cosigned By    Initials Name Provider Type    CZ Lei Lopez PT Physical Therapist           Time Calculation:         PT Charges     Row Name 03/13/18 1248             Time Calculation    Start Time 0920  -CZ      Stop Time 0959  -CZ      Time Calculation (min) 39 min  -CZ      PT Received On 03/13/18  -CZ      PT Goal Re-Cert Due Date 03/13/18  -CZ         Time Calculation- PT    Total Timed Code Minutes- PT 24 minute(s)  -CZ        User Key  (r) = Recorded By, (t) = Taken By, (c) = Cosigned By    Initials Name Provider Type    CZ Lei Lopez, PT Physical Therapist          Therapy Charges for Today     Code Description Service Date Service Provider Modifiers Qty    90708950081 HC PT MOBILITY CURRENT 3/13/2018 Lei Lopez, PT GP, CI 1    50447128452 HC PT MOBILITY PROJECTED 3/13/2018 Lei Lopez, PT GP, CI 1    84951814184 HC PT MOBILITY DISCHARGE 3/13/2018 Lei Lopez, PT GP, CI 1    65966848377 HC GAIT TRAINING EA 15 MIN 3/13/2018 Lei Lopez, PT  1    24822155032 HC PT THERAPEUTIC ACT EA 15 MIN 3/13/2018 Lei Lopez, PT  1    38045709202 HC PT EVAL MOD COMPLEXITY 1 3/13/2018 Lei Lopez, PT  1          PT G-Codes  PT Professional Judgement Used?: Yes  Outcome Measure Options: Darrin, AM-PAC 6 Clicks Basic Mobility (PT)  Score: 25  Functional Limitation: Mobility: Walking and moving around  Mobility: Walking and Moving Around Current Status (): At least 1 percent but less than 20 percent impaired, limited or restricted  Mobility: Walking and Moving Around  Goal Status (): At least 1 percent but less than 20 percent impaired, limited or restricted  Mobility: Walking and Moving Around Discharge Status (): At least 1 percent but less than 20 percent impaired, limited or restricted         Lei OMAYRA Lopez, PT  3/13/2018

## 2018-03-14 ENCOUNTER — INFUSION (OUTPATIENT)
Dept: PEDIATRICS | Facility: HOSPITAL | Age: 58
End: 2018-03-14

## 2018-03-14 VITALS
SYSTOLIC BLOOD PRESSURE: 105 MMHG | HEART RATE: 77 BPM | DIASTOLIC BLOOD PRESSURE: 65 MMHG | TEMPERATURE: 98.8 F | OXYGEN SATURATION: 98 % | RESPIRATION RATE: 18 BRPM

## 2018-03-14 VITALS
OXYGEN SATURATION: 98 % | RESPIRATION RATE: 18 BRPM | TEMPERATURE: 98.7 F | DIASTOLIC BLOOD PRESSURE: 74 MMHG | SYSTOLIC BLOOD PRESSURE: 120 MMHG | HEART RATE: 76 BPM

## 2018-03-14 DIAGNOSIS — M86.372 CHRONIC MULTIFOCAL OSTEOMYELITIS OF LEFT FOOT (HCC): Primary | ICD-10-CM

## 2018-03-14 PROCEDURE — 96365 THER/PROPH/DIAG IV INF INIT: CPT

## 2018-03-14 PROCEDURE — 25010000002 VANCOMYCIN PER 500 MG: Performed by: PODIATRIST

## 2018-03-14 RX ADMIN — VANCOMYCIN HYDROCHLORIDE 1750 MG: 5 INJECTION, POWDER, LYOPHILIZED, FOR SOLUTION INTRAVENOUS at 20:31

## 2018-03-14 RX ADMIN — VANCOMYCIN HYDROCHLORIDE 1750 MG: 1 INJECTION, POWDER, LYOPHILIZED, FOR SOLUTION INTRAVENOUS at 08:39

## 2018-03-15 ENCOUNTER — INFUSION (OUTPATIENT)
Dept: PEDIATRICS | Facility: HOSPITAL | Age: 58
End: 2018-03-15

## 2018-03-15 VITALS
DIASTOLIC BLOOD PRESSURE: 87 MMHG | SYSTOLIC BLOOD PRESSURE: 127 MMHG | RESPIRATION RATE: 19 BRPM | OXYGEN SATURATION: 99 % | TEMPERATURE: 98.4 F | HEART RATE: 83 BPM

## 2018-03-15 VITALS
SYSTOLIC BLOOD PRESSURE: 118 MMHG | HEART RATE: 80 BPM | OXYGEN SATURATION: 96 % | DIASTOLIC BLOOD PRESSURE: 80 MMHG | TEMPERATURE: 99.2 F

## 2018-03-15 DIAGNOSIS — M86.372 CHRONIC MULTIFOCAL OSTEOMYELITIS OF LEFT FOOT (HCC): Primary | ICD-10-CM

## 2018-03-15 PROCEDURE — 25010000002 VANCOMYCIN PER 500 MG: Performed by: PODIATRIST

## 2018-03-15 PROCEDURE — 96365 THER/PROPH/DIAG IV INF INIT: CPT

## 2018-03-15 RX ORDER — SODIUM CHLORIDE 0.9 % (FLUSH) 0.9 %
SYRINGE (ML) INJECTION
Status: DISPENSED
Start: 2018-03-15 | End: 2018-03-15

## 2018-03-15 RX ADMIN — VANCOMYCIN HYDROCHLORIDE 1750 MG: 1 INJECTION, POWDER, LYOPHILIZED, FOR SOLUTION INTRAVENOUS at 20:03

## 2018-03-15 RX ADMIN — VANCOMYCIN HYDROCHLORIDE 1750 MG: 1 INJECTION, POWDER, LYOPHILIZED, FOR SOLUTION INTRAVENOUS at 08:23

## 2018-03-16 ENCOUNTER — INFUSION (OUTPATIENT)
Dept: PEDIATRICS | Facility: HOSPITAL | Age: 58
End: 2018-03-16

## 2018-03-16 VITALS
OXYGEN SATURATION: 96 % | RESPIRATION RATE: 18 BRPM | HEART RATE: 77 BPM | DIASTOLIC BLOOD PRESSURE: 77 MMHG | SYSTOLIC BLOOD PRESSURE: 124 MMHG | TEMPERATURE: 98 F

## 2018-03-16 VITALS
HEART RATE: 76 BPM | DIASTOLIC BLOOD PRESSURE: 75 MMHG | SYSTOLIC BLOOD PRESSURE: 129 MMHG | OXYGEN SATURATION: 97 % | TEMPERATURE: 98.2 F | RESPIRATION RATE: 18 BRPM

## 2018-03-16 DIAGNOSIS — M86.372 CHRONIC MULTIFOCAL OSTEOMYELITIS OF LEFT FOOT (HCC): Primary | ICD-10-CM

## 2018-03-16 PROCEDURE — 96365 THER/PROPH/DIAG IV INF INIT: CPT

## 2018-03-16 PROCEDURE — 25010000002 VANCOMYCIN PER 500 MG: Performed by: PODIATRIST

## 2018-03-16 RX ADMIN — VANCOMYCIN HYDROCHLORIDE 1750 MG: 5 INJECTION, POWDER, LYOPHILIZED, FOR SOLUTION INTRAVENOUS at 08:32

## 2018-03-16 RX ADMIN — VANCOMYCIN HYDROCHLORIDE 1750 MG: 5 INJECTION, POWDER, LYOPHILIZED, FOR SOLUTION INTRAVENOUS at 19:51

## 2018-03-17 ENCOUNTER — INFUSION (OUTPATIENT)
Dept: PEDIATRICS | Facility: HOSPITAL | Age: 58
End: 2018-03-17

## 2018-03-17 VITALS
SYSTOLIC BLOOD PRESSURE: 121 MMHG | OXYGEN SATURATION: 97 % | RESPIRATION RATE: 18 BRPM | HEART RATE: 79 BPM | DIASTOLIC BLOOD PRESSURE: 73 MMHG | TEMPERATURE: 98.5 F

## 2018-03-17 VITALS
OXYGEN SATURATION: 98 % | TEMPERATURE: 98 F | HEART RATE: 83 BPM | RESPIRATION RATE: 16 BRPM | SYSTOLIC BLOOD PRESSURE: 126 MMHG | DIASTOLIC BLOOD PRESSURE: 82 MMHG

## 2018-03-17 DIAGNOSIS — M86.372 CHRONIC MULTIFOCAL OSTEOMYELITIS OF LEFT FOOT (HCC): Primary | ICD-10-CM

## 2018-03-17 DIAGNOSIS — M86.172 OTHER ACUTE OSTEOMYELITIS OF LEFT FOOT (HCC): Primary | ICD-10-CM

## 2018-03-17 DIAGNOSIS — M86.372 CHRONIC MULTIFOCAL OSTEOMYELITIS OF LEFT FOOT (HCC): ICD-10-CM

## 2018-03-17 LAB
ANION GAP SERPL CALCULATED.3IONS-SCNC: 10 MMOL/L (ref 5–15)
BUN BLD-MCNC: 21 MG/DL (ref 7–21)
BUN/CREAT SERPL: 21.4 (ref 7–25)
CALCIUM SPEC-SCNC: 9 MG/DL (ref 8.4–10.2)
CHLORIDE SERPL-SCNC: 100 MMOL/L (ref 95–110)
CO2 SERPL-SCNC: 31 MMOL/L (ref 22–31)
CREAT BLD-MCNC: 0.98 MG/DL (ref 0.7–1.3)
GFR SERPL CREATININE-BSD FRML MDRD: 79 ML/MIN/1.73 (ref 60–130)
GLUCOSE BLD-MCNC: 92 MG/DL (ref 60–100)
POTASSIUM BLD-SCNC: 4.4 MMOL/L (ref 3.5–5.1)
SODIUM BLD-SCNC: 141 MMOL/L (ref 137–145)
VANCOMYCIN TROUGH SERPL-MCNC: 13.52 MCG/ML (ref 10–15)

## 2018-03-17 PROCEDURE — 80202 ASSAY OF VANCOMYCIN: CPT | Performed by: PODIATRIST

## 2018-03-17 PROCEDURE — 96366 THER/PROPH/DIAG IV INF ADDON: CPT

## 2018-03-17 PROCEDURE — 96365 THER/PROPH/DIAG IV INF INIT: CPT

## 2018-03-17 PROCEDURE — 25010000002 VANCOMYCIN PER 500 MG: Performed by: PODIATRIST

## 2018-03-17 PROCEDURE — 80048 BASIC METABOLIC PNL TOTAL CA: CPT | Performed by: PODIATRIST

## 2018-03-17 PROCEDURE — 36415 COLL VENOUS BLD VENIPUNCTURE: CPT

## 2018-03-17 RX ADMIN — VANCOMYCIN HYDROCHLORIDE 2000 MG: 1 INJECTION, POWDER, LYOPHILIZED, FOR SOLUTION INTRAVENOUS at 19:21

## 2018-03-17 RX ADMIN — VANCOMYCIN HYDROCHLORIDE 2000 MG: 1 INJECTION, POWDER, LYOPHILIZED, FOR SOLUTION INTRAVENOUS at 09:23

## 2018-03-18 ENCOUNTER — INFUSION (OUTPATIENT)
Dept: PEDIATRICS | Facility: HOSPITAL | Age: 58
End: 2018-03-18

## 2018-03-18 VITALS
DIASTOLIC BLOOD PRESSURE: 73 MMHG | SYSTOLIC BLOOD PRESSURE: 118 MMHG | TEMPERATURE: 97.1 F | HEART RATE: 76 BPM | OXYGEN SATURATION: 98 %

## 2018-03-18 VITALS
SYSTOLIC BLOOD PRESSURE: 124 MMHG | DIASTOLIC BLOOD PRESSURE: 82 MMHG | HEART RATE: 81 BPM | RESPIRATION RATE: 16 BRPM | OXYGEN SATURATION: 98 % | TEMPERATURE: 97.7 F

## 2018-03-18 DIAGNOSIS — M86.372 CHRONIC MULTIFOCAL OSTEOMYELITIS OF LEFT FOOT (HCC): Primary | ICD-10-CM

## 2018-03-18 PROCEDURE — 96366 THER/PROPH/DIAG IV INF ADDON: CPT

## 2018-03-18 PROCEDURE — 25010000002 VANCOMYCIN PER 500 MG: Performed by: PODIATRIST

## 2018-03-18 PROCEDURE — 96365 THER/PROPH/DIAG IV INF INIT: CPT

## 2018-03-18 RX ADMIN — VANCOMYCIN HYDROCHLORIDE 2000 MG: 1 INJECTION, POWDER, LYOPHILIZED, FOR SOLUTION INTRAVENOUS at 19:59

## 2018-03-18 RX ADMIN — VANCOMYCIN HYDROCHLORIDE 2000 MG: 1 INJECTION, POWDER, LYOPHILIZED, FOR SOLUTION INTRAVENOUS at 08:21

## 2018-03-19 ENCOUNTER — INFUSION (OUTPATIENT)
Dept: PEDIATRICS | Facility: HOSPITAL | Age: 58
End: 2018-03-19

## 2018-03-19 VITALS
SYSTOLIC BLOOD PRESSURE: 121 MMHG | HEART RATE: 70 BPM | TEMPERATURE: 98.7 F | OXYGEN SATURATION: 97 % | RESPIRATION RATE: 20 BRPM | DIASTOLIC BLOOD PRESSURE: 74 MMHG

## 2018-03-19 VITALS
HEART RATE: 74 BPM | RESPIRATION RATE: 17 BRPM | DIASTOLIC BLOOD PRESSURE: 73 MMHG | SYSTOLIC BLOOD PRESSURE: 113 MMHG | OXYGEN SATURATION: 98 % | TEMPERATURE: 98.5 F

## 2018-03-19 DIAGNOSIS — M86.171: Primary | ICD-10-CM

## 2018-03-19 DIAGNOSIS — M86.372 CHRONIC MULTIFOCAL OSTEOMYELITIS OF LEFT FOOT (HCC): ICD-10-CM

## 2018-03-19 DIAGNOSIS — M86.9 OSTEOMYELITIS OF RIGHT FOOT, UNSPECIFIED TYPE (HCC): ICD-10-CM

## 2018-03-19 DIAGNOSIS — M86.372 CHRONIC MULTIFOCAL OSTEOMYELITIS OF LEFT FOOT (HCC): Primary | ICD-10-CM

## 2018-03-19 LAB
ANION GAP SERPL CALCULATED.3IONS-SCNC: 11 MMOL/L (ref 5–15)
BUN BLD-MCNC: 25 MG/DL (ref 7–21)
BUN/CREAT SERPL: 24.5 (ref 7–25)
CALCIUM SPEC-SCNC: 9 MG/DL (ref 8.4–10.2)
CHLORIDE SERPL-SCNC: 99 MMOL/L (ref 95–110)
CO2 SERPL-SCNC: 29 MMOL/L (ref 22–31)
CREAT BLD-MCNC: 1.02 MG/DL (ref 0.7–1.3)
GFR SERPL CREATININE-BSD FRML MDRD: 76 ML/MIN/1.73 (ref 60–130)
GLUCOSE BLD-MCNC: 97 MG/DL (ref 60–100)
POTASSIUM BLD-SCNC: 4.2 MMOL/L (ref 3.5–5.1)
SODIUM BLD-SCNC: 139 MMOL/L (ref 137–145)
VANCOMYCIN TROUGH SERPL-MCNC: 15.9 MCG/ML (ref 10–15)

## 2018-03-19 PROCEDURE — 36415 COLL VENOUS BLD VENIPUNCTURE: CPT

## 2018-03-19 PROCEDURE — 80048 BASIC METABOLIC PNL TOTAL CA: CPT | Performed by: PODIATRIST

## 2018-03-19 PROCEDURE — 25010000002 VANCOMYCIN PER 500 MG: Performed by: PODIATRIST

## 2018-03-19 PROCEDURE — 96366 THER/PROPH/DIAG IV INF ADDON: CPT

## 2018-03-19 PROCEDURE — 96365 THER/PROPH/DIAG IV INF INIT: CPT

## 2018-03-19 PROCEDURE — 80202 ASSAY OF VANCOMYCIN: CPT | Performed by: PODIATRIST

## 2018-03-19 RX ADMIN — VANCOMYCIN HYDROCHLORIDE 2000 MG: 1 INJECTION, POWDER, LYOPHILIZED, FOR SOLUTION INTRAVENOUS at 09:33

## 2018-03-19 RX ADMIN — VANCOMYCIN HYDROCHLORIDE 2000 MG: 5 INJECTION, POWDER, LYOPHILIZED, FOR SOLUTION INTRAVENOUS at 19:50

## 2018-03-20 ENCOUNTER — INFUSION (OUTPATIENT)
Dept: PEDIATRICS | Facility: HOSPITAL | Age: 58
End: 2018-03-20

## 2018-03-20 ENCOUNTER — OFFICE VISIT (OUTPATIENT)
Dept: PODIATRY | Facility: CLINIC | Age: 58
End: 2018-03-20

## 2018-03-20 VITALS
OXYGEN SATURATION: 98 % | HEART RATE: 82 BPM | SYSTOLIC BLOOD PRESSURE: 119 MMHG | DIASTOLIC BLOOD PRESSURE: 77 MMHG | TEMPERATURE: 98.4 F

## 2018-03-20 VITALS
OXYGEN SATURATION: 100 % | RESPIRATION RATE: 16 BRPM | SYSTOLIC BLOOD PRESSURE: 131 MMHG | DIASTOLIC BLOOD PRESSURE: 88 MMHG | TEMPERATURE: 98.2 F | HEART RATE: 78 BPM

## 2018-03-20 VITALS — WEIGHT: 291.01 LBS | HEIGHT: 76 IN | BODY MASS INDEX: 35.44 KG/M2

## 2018-03-20 DIAGNOSIS — M86.372 CHRONIC MULTIFOCAL OSTEOMYELITIS OF LEFT FOOT (HCC): Primary | ICD-10-CM

## 2018-03-20 DIAGNOSIS — T84.7XXA INFECTED HARDWARE IN LEFT LOWER EXTREMITY, INITIAL ENCOUNTER (HCC): ICD-10-CM

## 2018-03-20 DIAGNOSIS — L03.116 CELLULITIS OF LEFT FOOT: Primary | ICD-10-CM

## 2018-03-20 PROCEDURE — 96366 THER/PROPH/DIAG IV INF ADDON: CPT

## 2018-03-20 PROCEDURE — 96365 THER/PROPH/DIAG IV INF INIT: CPT

## 2018-03-20 PROCEDURE — 25010000002 VANCOMYCIN PER 500 MG: Performed by: PODIATRIST

## 2018-03-20 PROCEDURE — 99024 POSTOP FOLLOW-UP VISIT: CPT | Performed by: PODIATRIST

## 2018-03-20 RX ORDER — SODIUM CHLORIDE 0.9 % (FLUSH) 0.9 %
SYRINGE (ML) INJECTION
Status: COMPLETED
Start: 2018-03-20 | End: 2018-03-20

## 2018-03-20 RX ADMIN — Medication 10 ML: at 21:50

## 2018-03-20 RX ADMIN — VANCOMYCIN HYDROCHLORIDE 2000 MG: 1 INJECTION, POWDER, LYOPHILIZED, FOR SOLUTION INTRAVENOUS at 07:59

## 2018-03-20 RX ADMIN — VANCOMYCIN HYDROCHLORIDE 2000 MG: 5 INJECTION, POWDER, LYOPHILIZED, FOR SOLUTION INTRAVENOUS at 19:57

## 2018-03-20 NOTE — PROGRESS NOTES
Emre Lisa  1960  58 y.o. male   BS-89  Patient presents today for post op recheck of his left foot.     03/20/18  Chief Complaint   Patient presents with   • Left Foot - Follow-up           History of Present Illness    Patient presents to clinic today for follow-up.  He was recently discharged from the hospital.  He was admitted from this clinic with infection to left foot status post outpatient surgery.  He had hardware removal and several I&D's to the left great toe.  He is currently on IV antibiotics as an outpatient with a PICC line due to OM of the left hallux.  He denies pain today.  His dressing is clean, dry and intact.  He is weightbearing as tolerated in a surgical shoe.    Past Medical History:   Diagnosis Date   • Arthritis    • Atrial fibrillation    • Diabetes mellitus    • Diabetic foot ulcer associated with type 2 diabetes mellitus     left great toe   • Hallux malleus of left foot    • Hammer toe    • Hypertension    • MI (myocardial infarction)    • Neuropathy in diabetes    • Onychomycosis          Past Surgical History:   Procedure Laterality Date   • AMPUTATION DIGIT Right 3/5/2017    Procedure: RIGHT AMPUTATION TRANSMETATRASAL , RECESSION GASTROCNEMOUS;  Surgeon: Marco A Thompson DPM;  Location: [facility];  Service:    • CARDIAC DEFIBRILLATOR PLACEMENT  2010, 2016   • CARPAL TUNNEL RELEASE  2015    elbow and wrist left arm   • FOOT IRRIGATION, DEBRIDEMENT AND REPAIR Left 3/7/2018    Procedure: FOOT IRRIGATION, DEBRIDEMENT AND REPAIR;  Surgeon: Marco A Thompson DPM;  Location: [facility];  Service:    • FOOT IRRIGATION, DEBRIDEMENT AND REPAIR Left 3/10/2018    Procedure: FOOT IRRIGATION, DEBRIDEMENT AND REPAIR;  Surgeon: Marco A Thompson DPM;  Location: [facility] OR;  Service: Podiatry   • FOOT SURGERY     • FOOT WOUND CLOSURE Right 3/2/2017    Procedure: INCISION AND DRAINAGE, RIGHT FOOT;  Surgeon: Tung Rosenthal DPM;  Location: [facility];  Service:    • HAMMER TOE REPAIR Left  2/15/2018    Procedure: HAMMERTOE CORRECTION LEFT SECOND, THIRD AND FOURTH TOES AND HALLUX INTERPHALANGEAL JOINT ARTHRODESIS LEFT FOOT (Micro Asnis, 4.0 & 5.0 Asnis)      (c-arm);  Surgeon: Marco A Thompson DPM;  Location: [facility] OR;  Service:    • HARDWARE REMOVAL Left 3/5/2018    Procedure: ANKLE/FOOT HARDWARE REMOVAL;  Surgeon: Marco A Thompson DPM;  Location: [facility] OR;  Service:    • INCISION AND DRAINAGE LEG Left 3/5/2018    Procedure: INCISION AND DRAINAGE LOWER EXTREMITY;  Surgeon: Marco A Thompson DPM;  Location: [facility] OR;  Service:    • TOE AMPUTATION Right 2016   • TONSILECTOMY, ADENOIDECTOMY, BILATERAL MYRINGOTOMY AND TUBES      age 25         Family History   Problem Relation Age of Onset   • Diabetes Father    • Stroke Father    • No Known Problems Maternal Grandmother    • No Known Problems Maternal Grandfather    • No Known Problems Paternal Grandmother    • No Known Problems Paternal Grandfather    • No Known Problems Son    • No Known Problems Daughter    • No Known Problems Daughter          Social History     Social History   • Marital status:      Spouse name: N/A   • Number of children: N/A   • Years of education: N/A     Occupational History   • Not on file.     Social History Main Topics   • Smoking status: Never Smoker   • Smokeless tobacco: Never Used   • Alcohol use No   • Drug use: No   • Sexual activity: Defer     Other Topics Concern   • Not on file     Social History Narrative   • No narrative on file         Current Outpatient Prescriptions   Medication Sig Dispense Refill   • aspirin 81 MG chewable tablet Chew 81 mg Every Night.     • Bacitracin Zinc (MAGIC BUTT OINTMENT) Apply 1 each topically 2 (Two) Times a Day. 120 g 1   • Cholecalciferol (VITAMIN D3) 5000 UNITS capsule capsule Take 5,000 Units by mouth Every Night.     • Cyanocobalamin (VITAMIN B 12 PO) Take 1,000 mcg by mouth Every Night.     • dronedarone (MULTAQ) 400 MG tablet Take 400 mg by mouth Every Night.     •  "fluticasone (FLONASE) 50 MCG/ACT nasal spray 2 sprays into each nostril As Needed for Rhinitis.     • glimepiride (AMARYL) 2 MG tablet Take 4 mg by mouth 2 (Two) Times a Day.     • HYDROcodone-acetaminophen (NORCO) 7.5-325 MG per tablet Take 1 tablet by mouth Every 6 (Six) Hours As Needed for Moderate Pain . 30 tablet 0   • magnesium oxide (MAGOX) 400 (241.3 Mg) MG tablet tablet Take 400 mg by mouth 2 (Two) Times a Day.     • metFORMIN (GLUCOPHAGE) 1000 MG tablet Take 1,000 mg by mouth 2 (Two) Times a Day With Meals. Patient states he only uses when needed     • metoprolol tartrate (LOPRESSOR) 25 MG tablet Take 12.5 mg by mouth Every Night.     • ONE TOUCH ULTRA TEST test strip USE TO TEST BLOOD SUGAR THREE TIMES A DAY  1   • valsartan (DIOVAN) 160 MG tablet Take 40 mg by mouth Every Night. 1/2 tablet daily      • Vancomycin HCl in Dextrose (PHARMACY TO DOSE VANCOMYCIN) Continuous As Needed (pharmacy to dose vancomycin begining with second dose) for up to 33 days.     • vitamin C (ASCORBIC ACID) 500 MG tablet Take 500 mg by mouth Every Night.     • vitamin E 200 UNIT capsule Take 200 Units by mouth Every Night.       No current facility-administered medications for this visit.            OBJECTIVE    Ht 193 cm (75.98\")   Wt 132 kg (291 lb 0.1 oz)   BMI 35.44 kg/m²     Review of Systems   Constitutional: Negative for chills and fever.   Cardiovascular: Negative for chest pain.   Gastrointestinal: Negative for constipation, diarrhea, nausea and vomiting.         Constitutional: well developed, well nourished    HEENT: Normocephalic and atraumatic, normal hearing    Respiratory: Non labored respirations noted    LLE: Incision sites are well approximated with no signs of dehiscence noted.  No drainage noted.  No surrounding erythema noted.  Hallux is rectus.  Remaining digits are rectus.  Homans.    Psychiatric: A&O x 3 with normal mood and affect. NAD.       Procedures        ASSESSMENT AND PLAN    Emre was seen " today for follow-up.    Diagnoses and all orders for this visit:    Cellulitis of left foot    Infected hardware in left lower extremity, initial encounter      - Sutures to lesser digits were removed.  Sterile dressing applied.  Keep clean, dry and intact.  - Weightbearing as tolerated in surgical shoe  - Return to clinic in 1 week          This document has been electronically signed by Marco A Thompson DPM on March 20, 2018 6:39 PM

## 2018-03-21 ENCOUNTER — INFUSION (OUTPATIENT)
Dept: PEDIATRICS | Facility: HOSPITAL | Age: 58
End: 2018-03-21

## 2018-03-21 VITALS
DIASTOLIC BLOOD PRESSURE: 77 MMHG | RESPIRATION RATE: 17 BRPM | OXYGEN SATURATION: 96 % | SYSTOLIC BLOOD PRESSURE: 116 MMHG | TEMPERATURE: 98.5 F | HEART RATE: 80 BPM

## 2018-03-21 VITALS
RESPIRATION RATE: 16 BRPM | TEMPERATURE: 98.2 F | SYSTOLIC BLOOD PRESSURE: 129 MMHG | HEART RATE: 80 BPM | OXYGEN SATURATION: 96 % | DIASTOLIC BLOOD PRESSURE: 79 MMHG

## 2018-03-21 DIAGNOSIS — M86.372 CHRONIC MULTIFOCAL OSTEOMYELITIS OF LEFT FOOT (HCC): Primary | ICD-10-CM

## 2018-03-21 PROCEDURE — 25010000002 VANCOMYCIN PER 500 MG: Performed by: PODIATRIST

## 2018-03-21 PROCEDURE — 96366 THER/PROPH/DIAG IV INF ADDON: CPT

## 2018-03-21 PROCEDURE — 96365 THER/PROPH/DIAG IV INF INIT: CPT

## 2018-03-21 RX ADMIN — VANCOMYCIN HYDROCHLORIDE 2000 MG: 1 INJECTION, POWDER, LYOPHILIZED, FOR SOLUTION INTRAVENOUS at 18:55

## 2018-03-21 RX ADMIN — VANCOMYCIN HYDROCHLORIDE 2000 MG: 1 INJECTION, POWDER, LYOPHILIZED, FOR SOLUTION INTRAVENOUS at 07:27

## 2018-03-22 ENCOUNTER — INFUSION (OUTPATIENT)
Dept: PEDIATRICS | Facility: HOSPITAL | Age: 58
End: 2018-03-22

## 2018-03-22 VITALS
DIASTOLIC BLOOD PRESSURE: 76 MMHG | SYSTOLIC BLOOD PRESSURE: 119 MMHG | TEMPERATURE: 98.7 F | OXYGEN SATURATION: 97 % | HEART RATE: 78 BPM

## 2018-03-22 VITALS
SYSTOLIC BLOOD PRESSURE: 117 MMHG | DIASTOLIC BLOOD PRESSURE: 70 MMHG | HEART RATE: 76 BPM | RESPIRATION RATE: 18 BRPM | TEMPERATURE: 97 F | OXYGEN SATURATION: 98 %

## 2018-03-22 DIAGNOSIS — M86.372 CHRONIC MULTIFOCAL OSTEOMYELITIS OF LEFT FOOT (HCC): Primary | ICD-10-CM

## 2018-03-22 PROCEDURE — 25010000002 VANCOMYCIN PER 500 MG: Performed by: PODIATRIST

## 2018-03-22 PROCEDURE — 96365 THER/PROPH/DIAG IV INF INIT: CPT

## 2018-03-22 PROCEDURE — 96366 THER/PROPH/DIAG IV INF ADDON: CPT

## 2018-03-22 RX ADMIN — VANCOMYCIN HYDROCHLORIDE 2000 MG: 5 INJECTION, POWDER, LYOPHILIZED, FOR SOLUTION INTRAVENOUS at 06:20

## 2018-03-23 ENCOUNTER — INFUSION (OUTPATIENT)
Dept: PEDIATRICS | Facility: HOSPITAL | Age: 58
End: 2018-03-23

## 2018-03-23 VITALS
SYSTOLIC BLOOD PRESSURE: 116 MMHG | DIASTOLIC BLOOD PRESSURE: 70 MMHG | RESPIRATION RATE: 20 BRPM | TEMPERATURE: 97.9 F | HEART RATE: 75 BPM | OXYGEN SATURATION: 97 %

## 2018-03-23 VITALS
DIASTOLIC BLOOD PRESSURE: 75 MMHG | HEART RATE: 82 BPM | RESPIRATION RATE: 26 BRPM | OXYGEN SATURATION: 95 % | SYSTOLIC BLOOD PRESSURE: 129 MMHG | TEMPERATURE: 98.9 F

## 2018-03-23 DIAGNOSIS — M86.372 CHRONIC MULTIFOCAL OSTEOMYELITIS OF LEFT FOOT (HCC): Primary | ICD-10-CM

## 2018-03-23 LAB
ANION GAP SERPL CALCULATED.3IONS-SCNC: 15 MMOL/L (ref 5–15)
BUN BLD-MCNC: 26 MG/DL (ref 7–21)
BUN/CREAT SERPL: 28.6 (ref 7–25)
CALCIUM SPEC-SCNC: 9.2 MG/DL (ref 8.4–10.2)
CHLORIDE SERPL-SCNC: 102 MMOL/L (ref 95–110)
CO2 SERPL-SCNC: 26 MMOL/L (ref 22–31)
CREAT BLD-MCNC: 0.91 MG/DL (ref 0.7–1.3)
GFR SERPL CREATININE-BSD FRML MDRD: 86 ML/MIN/1.73 (ref 56–130)
GLUCOSE BLD-MCNC: 77 MG/DL (ref 60–100)
POTASSIUM BLD-SCNC: 4.2 MMOL/L (ref 3.5–5.1)
SODIUM BLD-SCNC: 143 MMOL/L (ref 137–145)
VANCOMYCIN TROUGH SERPL-MCNC: 19.06 MCG/ML (ref 10–15)

## 2018-03-23 PROCEDURE — 25010000002 VANCOMYCIN PER 500 MG: Performed by: PODIATRIST

## 2018-03-23 PROCEDURE — 96365 THER/PROPH/DIAG IV INF INIT: CPT

## 2018-03-23 PROCEDURE — 96366 THER/PROPH/DIAG IV INF ADDON: CPT

## 2018-03-23 PROCEDURE — 80202 ASSAY OF VANCOMYCIN: CPT | Performed by: PODIATRIST

## 2018-03-23 PROCEDURE — 80048 BASIC METABOLIC PNL TOTAL CA: CPT | Performed by: PODIATRIST

## 2018-03-23 RX ORDER — SODIUM CHLORIDE 0.9 % (FLUSH) 0.9 %
SYRINGE (ML) INJECTION
Status: DISPENSED
Start: 2018-03-23 | End: 2018-03-23

## 2018-03-23 RX ADMIN — VANCOMYCIN HYDROCHLORIDE 2000 MG: 1 INJECTION, POWDER, LYOPHILIZED, FOR SOLUTION INTRAVENOUS at 15:27

## 2018-03-24 ENCOUNTER — INFUSION (OUTPATIENT)
Dept: PEDIATRICS | Facility: HOSPITAL | Age: 58
End: 2018-03-24

## 2018-03-24 VITALS
TEMPERATURE: 97.6 F | RESPIRATION RATE: 18 BRPM | OXYGEN SATURATION: 97 % | DIASTOLIC BLOOD PRESSURE: 81 MMHG | HEART RATE: 75 BPM | SYSTOLIC BLOOD PRESSURE: 118 MMHG

## 2018-03-24 VITALS
OXYGEN SATURATION: 96 % | TEMPERATURE: 98.3 F | RESPIRATION RATE: 18 BRPM | HEART RATE: 77 BPM | SYSTOLIC BLOOD PRESSURE: 133 MMHG | DIASTOLIC BLOOD PRESSURE: 93 MMHG

## 2018-03-24 DIAGNOSIS — M86.372 CHRONIC MULTIFOCAL OSTEOMYELITIS OF LEFT FOOT (HCC): Primary | ICD-10-CM

## 2018-03-24 PROCEDURE — 96366 THER/PROPH/DIAG IV INF ADDON: CPT

## 2018-03-24 PROCEDURE — 25010000002 VANCOMYCIN PER 500 MG: Performed by: PODIATRIST

## 2018-03-24 PROCEDURE — 96365 THER/PROPH/DIAG IV INF INIT: CPT

## 2018-03-24 RX ADMIN — VANCOMYCIN HYDROCHLORIDE 2000 MG: 1 INJECTION, POWDER, LYOPHILIZED, FOR SOLUTION INTRAVENOUS at 06:56

## 2018-03-24 RX ADMIN — VANCOMYCIN HYDROCHLORIDE 2000 MG: 1 INJECTION, POWDER, LYOPHILIZED, FOR SOLUTION INTRAVENOUS at 18:49

## 2018-03-25 ENCOUNTER — INFUSION (OUTPATIENT)
Dept: PEDIATRICS | Facility: HOSPITAL | Age: 58
End: 2018-03-25

## 2018-03-25 VITALS
OXYGEN SATURATION: 97 % | TEMPERATURE: 97.6 F | SYSTOLIC BLOOD PRESSURE: 148 MMHG | DIASTOLIC BLOOD PRESSURE: 80 MMHG | HEART RATE: 77 BPM | RESPIRATION RATE: 16 BRPM

## 2018-03-25 VITALS
SYSTOLIC BLOOD PRESSURE: 125 MMHG | RESPIRATION RATE: 16 BRPM | OXYGEN SATURATION: 95 % | HEART RATE: 79 BPM | DIASTOLIC BLOOD PRESSURE: 87 MMHG | TEMPERATURE: 98.4 F

## 2018-03-25 DIAGNOSIS — M86.372 CHRONIC MULTIFOCAL OSTEOMYELITIS OF LEFT FOOT (HCC): Primary | ICD-10-CM

## 2018-03-25 PROCEDURE — 96365 THER/PROPH/DIAG IV INF INIT: CPT

## 2018-03-25 PROCEDURE — 25010000002 VANCOMYCIN PER 500 MG: Performed by: PODIATRIST

## 2018-03-25 PROCEDURE — 96366 THER/PROPH/DIAG IV INF ADDON: CPT

## 2018-03-25 RX ADMIN — VANCOMYCIN HYDROCHLORIDE 2000 MG: 1 INJECTION, POWDER, LYOPHILIZED, FOR SOLUTION INTRAVENOUS at 19:29

## 2018-03-25 RX ADMIN — VANCOMYCIN HYDROCHLORIDE 2000 MG: 1 INJECTION, POWDER, LYOPHILIZED, FOR SOLUTION INTRAVENOUS at 07:41

## 2018-03-26 ENCOUNTER — INFUSION (OUTPATIENT)
Dept: PEDIATRICS | Facility: HOSPITAL | Age: 58
End: 2018-03-26

## 2018-03-26 VITALS
HEART RATE: 79 BPM | DIASTOLIC BLOOD PRESSURE: 81 MMHG | SYSTOLIC BLOOD PRESSURE: 124 MMHG | TEMPERATURE: 97.7 F | OXYGEN SATURATION: 97 % | RESPIRATION RATE: 18 BRPM

## 2018-03-26 VITALS
OXYGEN SATURATION: 97 % | SYSTOLIC BLOOD PRESSURE: 126 MMHG | DIASTOLIC BLOOD PRESSURE: 81 MMHG | TEMPERATURE: 98.3 F | RESPIRATION RATE: 20 BRPM | HEART RATE: 72 BPM

## 2018-03-26 DIAGNOSIS — M86.372 CHRONIC MULTIFOCAL OSTEOMYELITIS OF LEFT FOOT (HCC): Primary | ICD-10-CM

## 2018-03-26 PROCEDURE — 96366 THER/PROPH/DIAG IV INF ADDON: CPT

## 2018-03-26 PROCEDURE — 25010000002 VANCOMYCIN PER 500 MG: Performed by: PODIATRIST

## 2018-03-26 PROCEDURE — 96365 THER/PROPH/DIAG IV INF INIT: CPT

## 2018-03-26 RX ORDER — SODIUM CHLORIDE 0.9 % (FLUSH) 0.9 %
SYRINGE (ML) INJECTION
Status: DISPENSED
Start: 2018-03-26 | End: 2018-03-27

## 2018-03-26 RX ADMIN — VANCOMYCIN HYDROCHLORIDE 2000 MG: 1 INJECTION, POWDER, LYOPHILIZED, FOR SOLUTION INTRAVENOUS at 07:12

## 2018-03-26 RX ADMIN — VANCOMYCIN HYDROCHLORIDE 2000 MG: 1 INJECTION, POWDER, LYOPHILIZED, FOR SOLUTION INTRAVENOUS at 18:58

## 2018-03-27 ENCOUNTER — INFUSION (OUTPATIENT)
Dept: PEDIATRICS | Facility: HOSPITAL | Age: 58
End: 2018-03-27

## 2018-03-27 ENCOUNTER — OFFICE VISIT (OUTPATIENT)
Dept: PODIATRY | Facility: CLINIC | Age: 58
End: 2018-03-27

## 2018-03-27 VITALS
DIASTOLIC BLOOD PRESSURE: 82 MMHG | RESPIRATION RATE: 18 BRPM | HEART RATE: 85 BPM | SYSTOLIC BLOOD PRESSURE: 148 MMHG | OXYGEN SATURATION: 97 % | TEMPERATURE: 98.4 F

## 2018-03-27 VITALS
HEART RATE: 86 BPM | RESPIRATION RATE: 18 BRPM | SYSTOLIC BLOOD PRESSURE: 118 MMHG | DIASTOLIC BLOOD PRESSURE: 86 MMHG | OXYGEN SATURATION: 96 % | TEMPERATURE: 98.1 F

## 2018-03-27 VITALS — BODY MASS INDEX: 35.44 KG/M2 | WEIGHT: 291.01 LBS | HEIGHT: 76 IN

## 2018-03-27 DIAGNOSIS — T84.7XXD INFECTED HARDWARE IN LEFT LOWER EXTREMITY, SUBSEQUENT ENCOUNTER: Primary | ICD-10-CM

## 2018-03-27 DIAGNOSIS — M86.372 CHRONIC MULTIFOCAL OSTEOMYELITIS OF LEFT FOOT (HCC): Primary | ICD-10-CM

## 2018-03-27 LAB
ANION GAP SERPL CALCULATED.3IONS-SCNC: 13 MMOL/L (ref 5–15)
BUN BLD-MCNC: 24 MG/DL (ref 7–21)
BUN/CREAT SERPL: 23.1 (ref 7–25)
CALCIUM SPEC-SCNC: 8.9 MG/DL (ref 8.4–10.2)
CHLORIDE SERPL-SCNC: 101 MMOL/L (ref 95–110)
CO2 SERPL-SCNC: 28 MMOL/L (ref 22–31)
CREAT BLD-MCNC: 1.04 MG/DL (ref 0.7–1.3)
GFR SERPL CREATININE-BSD FRML MDRD: 74 ML/MIN/1.73 (ref 56–130)
GLUCOSE BLD-MCNC: 109 MG/DL (ref 60–100)
POTASSIUM BLD-SCNC: 4.7 MMOL/L (ref 3.5–5.1)
SODIUM BLD-SCNC: 142 MMOL/L (ref 137–145)
VANCOMYCIN TROUGH SERPL-MCNC: 17.31 MCG/ML (ref 10–15)

## 2018-03-27 PROCEDURE — 96365 THER/PROPH/DIAG IV INF INIT: CPT

## 2018-03-27 PROCEDURE — 96366 THER/PROPH/DIAG IV INF ADDON: CPT

## 2018-03-27 PROCEDURE — 99024 POSTOP FOLLOW-UP VISIT: CPT | Performed by: PODIATRIST

## 2018-03-27 PROCEDURE — 80048 BASIC METABOLIC PNL TOTAL CA: CPT | Performed by: PODIATRIST

## 2018-03-27 PROCEDURE — 80202 ASSAY OF VANCOMYCIN: CPT | Performed by: PODIATRIST

## 2018-03-27 PROCEDURE — 25010000002 VANCOMYCIN PER 500 MG: Performed by: PODIATRIST

## 2018-03-27 PROCEDURE — 36415 COLL VENOUS BLD VENIPUNCTURE: CPT

## 2018-03-27 RX ADMIN — VANCOMYCIN HYDROCHLORIDE 2000 MG: 1 INJECTION, POWDER, LYOPHILIZED, FOR SOLUTION INTRAVENOUS at 08:38

## 2018-03-27 RX ADMIN — VANCOMYCIN HYDROCHLORIDE 2000 MG: 5 INJECTION, POWDER, LYOPHILIZED, FOR SOLUTION INTRAVENOUS at 18:27

## 2018-03-27 NOTE — PROGRESS NOTES
Emre Lisa  1960  58 y.o. male   BS-89  Patient presents today for post op recheck of his left foot.     03/28/18  Chief Complaint   Patient presents with   • Left Foot - Follow-up           History of Present Illness    Patient presents to clinic today for follow-up.  His dressing is clean, dry and intact.  He continues to receive IV antibiotics via his PICC line.  He is ambulating in a surgical shoe.  He denies pain.    Past Medical History:   Diagnosis Date   • Arthritis    • Atrial fibrillation    • Diabetes mellitus    • Diabetic foot ulcer associated with type 2 diabetes mellitus     left great toe   • Hallux malleus of left foot    • Hammer toe    • Hypertension    • MI (myocardial infarction)    • Neuropathy in diabetes    • Onychomycosis          Past Surgical History:   Procedure Laterality Date   • AMPUTATION DIGIT Right 3/5/2017    Procedure: RIGHT AMPUTATION TRANSMETATRASAL , RECESSION GASTROCNEMOUS;  Surgeon: Marco A Thompson DPM;  Location: [facility];  Service:    • CARDIAC DEFIBRILLATOR PLACEMENT  2010, 2016   • CARPAL TUNNEL RELEASE  2015    elbow and wrist left arm   • FOOT IRRIGATION, DEBRIDEMENT AND REPAIR Left 3/7/2018    Procedure: FOOT IRRIGATION, DEBRIDEMENT AND REPAIR;  Surgeon: Marco A Thompson DPM;  Location: [facility];  Service:    • FOOT IRRIGATION, DEBRIDEMENT AND REPAIR Left 3/10/2018    Procedure: FOOT IRRIGATION, DEBRIDEMENT AND REPAIR;  Surgeon: Marco A Thompson DPM;  Location: [facility] OR;  Service: Podiatry   • FOOT SURGERY     • FOOT WOUND CLOSURE Right 3/2/2017    Procedure: INCISION AND DRAINAGE, RIGHT FOOT;  Surgeon: Tung Rosenthal DPM;  Location: [facility] OR;  Service:    • HAMMER TOE REPAIR Left 2/15/2018    Procedure: HAMMERTOE CORRECTION LEFT SECOND, THIRD AND FOURTH TOES AND HALLUX INTERPHALANGEAL JOINT ARTHRODESIS LEFT FOOT (Micro Asnis, 4.0 & 5.0 Asnis)      (c-arm);  Surgeon: Marco A Thompson DPM;  Location: [facility] OR;  Service:    • HARDWARE REMOVAL Left  3/5/2018    Procedure: ANKLE/FOOT HARDWARE REMOVAL;  Surgeon: Marco A Thompson DPM;  Location: [facility] OR;  Service:    • INCISION AND DRAINAGE LEG Left 3/5/2018    Procedure: INCISION AND DRAINAGE LOWER EXTREMITY;  Surgeon: Marco A Thompson DPM;  Location: [facility];  Service:    • TOE AMPUTATION Right 2016   • TONSILECTOMY, ADENOIDECTOMY, BILATERAL MYRINGOTOMY AND TUBES      age 25         Family History   Problem Relation Age of Onset   • Diabetes Father    • Stroke Father    • No Known Problems Maternal Grandmother    • No Known Problems Maternal Grandfather    • No Known Problems Paternal Grandmother    • No Known Problems Paternal Grandfather    • No Known Problems Son    • No Known Problems Daughter    • No Known Problems Daughter          Social History     Social History   • Marital status:      Spouse name: N/A   • Number of children: N/A   • Years of education: N/A     Occupational History   • Not on file.     Social History Main Topics   • Smoking status: Never Smoker   • Smokeless tobacco: Never Used   • Alcohol use No   • Drug use: No   • Sexual activity: Defer     Other Topics Concern   • Not on file     Social History Narrative   • No narrative on file         Current Outpatient Prescriptions   Medication Sig Dispense Refill   • aspirin 81 MG chewable tablet Chew 81 mg Every Night.     • Bacitracin Zinc (MAGIC BUTT OINTMENT) Apply 1 each topically 2 (Two) Times a Day. 120 g 1   • Cholecalciferol (VITAMIN D3) 5000 UNITS capsule capsule Take 5,000 Units by mouth Every Night.     • Cyanocobalamin (VITAMIN B 12 PO) Take 1,000 mcg by mouth Every Night.     • dronedarone (MULTAQ) 400 MG tablet Take 400 mg by mouth Every Night.     • fluticasone (FLONASE) 50 MCG/ACT nasal spray 2 sprays into each nostril As Needed for Rhinitis.     • glimepiride (AMARYL) 2 MG tablet Take 4 mg by mouth 2 (Two) Times a Day.     • HYDROcodone-acetaminophen (NORCO) 7.5-325 MG per tablet Take 1 tablet by mouth Every 6 (Six)  "Hours As Needed for Moderate Pain . 30 tablet 0   • magnesium oxide (MAGOX) 400 (241.3 Mg) MG tablet tablet Take 400 mg by mouth 2 (Two) Times a Day.     • metFORMIN (GLUCOPHAGE) 1000 MG tablet Take 1,000 mg by mouth 2 (Two) Times a Day With Meals. Patient states he only uses when needed     • metoprolol tartrate (LOPRESSOR) 25 MG tablet Take 12.5 mg by mouth Every Night.     • ONE TOUCH ULTRA TEST test strip USE TO TEST BLOOD SUGAR THREE TIMES A DAY  1   • valsartan (DIOVAN) 160 MG tablet Take 40 mg by mouth Every Night. 1/2 tablet daily      • Vancomycin HCl in Dextrose (PHARMACY TO DOSE VANCOMYCIN) Continuous As Needed (pharmacy to dose vancomycin begining with second dose) for up to 33 days.     • vitamin C (ASCORBIC ACID) 500 MG tablet Take 500 mg by mouth Every Night.     • vitamin E 200 UNIT capsule Take 200 Units by mouth Every Night.       No current facility-administered medications for this visit.            OBJECTIVE    Ht 193 cm (75.98\")   Wt 132 kg (291 lb 0.1 oz)   BMI 35.44 kg/m²     Review of Systems   Constitutional: Negative for chills and fever.   Cardiovascular: Negative for chest pain.   Gastrointestinal: Negative for constipation, diarrhea, nausea and vomiting.         Constitutional: well developed, well nourished    HEENT: Normocephalic and atraumatic, normal hearing    Respiratory: Non labored respirations noted    LLE: Incision sites are well approximated with no signs of dehiscence noted.  No drainage noted.  No surrounding erythema noted.  Hallux is slightly abducted.  Remaining digits are rectus.  Negative homans.    Psychiatric: A&O x 3 with normal mood and affect. NAD.       Procedures        ASSESSMENT AND PLAN    Emre was seen today for follow-up.    Diagnoses and all orders for this visit:    Infected hardware in left lower extremity, subsequent encounter  -     XR Foot 3+ View Left      - X-rays taken and reviewed today.  - Sterile dressing applied.  Keep clean, dry and " intact.  - Weightbearing as tolerated in surgical shoe  - Return to clinic in 1 week          This document has been electronically signed by Marco A Thompson DPM on March 28, 2018 7:11 AM

## 2018-03-28 ENCOUNTER — INFUSION (OUTPATIENT)
Dept: PEDIATRICS | Facility: HOSPITAL | Age: 58
End: 2018-03-28

## 2018-03-28 VITALS
SYSTOLIC BLOOD PRESSURE: 129 MMHG | TEMPERATURE: 98.2 F | DIASTOLIC BLOOD PRESSURE: 91 MMHG | OXYGEN SATURATION: 98 % | RESPIRATION RATE: 16 BRPM

## 2018-03-28 VITALS
DIASTOLIC BLOOD PRESSURE: 89 MMHG | HEART RATE: 88 BPM | OXYGEN SATURATION: 97 % | RESPIRATION RATE: 18 BRPM | TEMPERATURE: 97.5 F | SYSTOLIC BLOOD PRESSURE: 134 MMHG

## 2018-03-28 DIAGNOSIS — M86.372 CHRONIC MULTIFOCAL OSTEOMYELITIS OF LEFT FOOT (HCC): Primary | ICD-10-CM

## 2018-03-28 PROCEDURE — 96365 THER/PROPH/DIAG IV INF INIT: CPT

## 2018-03-28 PROCEDURE — 25010000002 VANCOMYCIN PER 500 MG: Performed by: PODIATRIST

## 2018-03-28 PROCEDURE — 96366 THER/PROPH/DIAG IV INF ADDON: CPT

## 2018-03-28 RX ADMIN — VANCOMYCIN HYDROCHLORIDE 2000 MG: 1 INJECTION, POWDER, LYOPHILIZED, FOR SOLUTION INTRAVENOUS at 08:06

## 2018-03-28 RX ADMIN — VANCOMYCIN HYDROCHLORIDE 2000 MG: 1 INJECTION, POWDER, LYOPHILIZED, FOR SOLUTION INTRAVENOUS at 20:06

## 2018-03-29 ENCOUNTER — INFUSION (OUTPATIENT)
Dept: PEDIATRICS | Facility: HOSPITAL | Age: 58
End: 2018-03-29

## 2018-03-29 VITALS
OXYGEN SATURATION: 96 % | TEMPERATURE: 98.1 F | SYSTOLIC BLOOD PRESSURE: 121 MMHG | DIASTOLIC BLOOD PRESSURE: 84 MMHG | RESPIRATION RATE: 18 BRPM | HEART RATE: 83 BPM

## 2018-03-29 VITALS
TEMPERATURE: 98.4 F | HEART RATE: 73 BPM | OXYGEN SATURATION: 97 % | SYSTOLIC BLOOD PRESSURE: 140 MMHG | DIASTOLIC BLOOD PRESSURE: 78 MMHG | RESPIRATION RATE: 26 BRPM

## 2018-03-29 DIAGNOSIS — M86.372 CHRONIC MULTIFOCAL OSTEOMYELITIS OF LEFT FOOT (HCC): Primary | ICD-10-CM

## 2018-03-29 PROCEDURE — 96366 THER/PROPH/DIAG IV INF ADDON: CPT

## 2018-03-29 PROCEDURE — 96365 THER/PROPH/DIAG IV INF INIT: CPT

## 2018-03-29 PROCEDURE — 25010000002 VANCOMYCIN PER 500 MG: Performed by: PODIATRIST

## 2018-03-29 RX ADMIN — VANCOMYCIN HYDROCHLORIDE 2000 MG: 1 INJECTION, POWDER, LYOPHILIZED, FOR SOLUTION INTRAVENOUS at 07:53

## 2018-03-29 RX ADMIN — VANCOMYCIN HYDROCHLORIDE 2000 MG: 1 INJECTION, POWDER, LYOPHILIZED, FOR SOLUTION INTRAVENOUS at 20:09

## 2018-03-30 ENCOUNTER — INFUSION (OUTPATIENT)
Dept: PEDIATRICS | Facility: HOSPITAL | Age: 58
End: 2018-03-30

## 2018-03-30 VITALS
RESPIRATION RATE: 18 BRPM | HEART RATE: 77 BPM | DIASTOLIC BLOOD PRESSURE: 82 MMHG | TEMPERATURE: 98.1 F | OXYGEN SATURATION: 97 % | SYSTOLIC BLOOD PRESSURE: 122 MMHG

## 2018-03-30 VITALS
OXYGEN SATURATION: 97 % | RESPIRATION RATE: 16 BRPM | DIASTOLIC BLOOD PRESSURE: 68 MMHG | TEMPERATURE: 97.3 F | SYSTOLIC BLOOD PRESSURE: 126 MMHG | HEART RATE: 80 BPM

## 2018-03-30 DIAGNOSIS — M86.372 CHRONIC MULTIFOCAL OSTEOMYELITIS OF LEFT FOOT (HCC): Primary | ICD-10-CM

## 2018-03-30 PROCEDURE — 96365 THER/PROPH/DIAG IV INF INIT: CPT

## 2018-03-30 PROCEDURE — 25010000002 VANCOMYCIN PER 500 MG: Performed by: PODIATRIST

## 2018-03-30 PROCEDURE — 96366 THER/PROPH/DIAG IV INF ADDON: CPT

## 2018-03-30 RX ADMIN — VANCOMYCIN HYDROCHLORIDE 2000 MG: 5 INJECTION, POWDER, LYOPHILIZED, FOR SOLUTION INTRAVENOUS at 19:15

## 2018-03-30 RX ADMIN — VANCOMYCIN HYDROCHLORIDE 2000 MG: 1 INJECTION, POWDER, LYOPHILIZED, FOR SOLUTION INTRAVENOUS at 07:14

## 2018-03-31 ENCOUNTER — INFUSION (OUTPATIENT)
Dept: PEDIATRICS | Facility: HOSPITAL | Age: 58
End: 2018-03-31

## 2018-03-31 VITALS
TEMPERATURE: 97.4 F | OXYGEN SATURATION: 96 % | HEART RATE: 77 BPM | RESPIRATION RATE: 16 BRPM | DIASTOLIC BLOOD PRESSURE: 72 MMHG | SYSTOLIC BLOOD PRESSURE: 106 MMHG

## 2018-03-31 VITALS
RESPIRATION RATE: 17 BRPM | TEMPERATURE: 97.5 F | OXYGEN SATURATION: 98 % | DIASTOLIC BLOOD PRESSURE: 70 MMHG | HEART RATE: 74 BPM | SYSTOLIC BLOOD PRESSURE: 107 MMHG

## 2018-03-31 DIAGNOSIS — M86.372 CHRONIC MULTIFOCAL OSTEOMYELITIS OF LEFT FOOT (HCC): Primary | ICD-10-CM

## 2018-03-31 LAB
ANION GAP SERPL CALCULATED.3IONS-SCNC: 15 MMOL/L (ref 5–15)
BUN BLD-MCNC: 25 MG/DL (ref 7–21)
BUN/CREAT SERPL: 25.3 (ref 7–25)
CALCIUM SPEC-SCNC: 9 MG/DL (ref 8.4–10.2)
CHLORIDE SERPL-SCNC: 101 MMOL/L (ref 95–110)
CO2 SERPL-SCNC: 27 MMOL/L (ref 22–31)
CREAT BLD-MCNC: 0.99 MG/DL (ref 0.7–1.3)
GFR SERPL CREATININE-BSD FRML MDRD: 78 ML/MIN/1.73 (ref 56–130)
GLUCOSE BLD-MCNC: 100 MG/DL (ref 60–100)
POTASSIUM BLD-SCNC: 4.6 MMOL/L (ref 3.5–5.1)
SODIUM BLD-SCNC: 143 MMOL/L (ref 137–145)
VANCOMYCIN TROUGH SERPL-MCNC: 18.12 MCG/ML (ref 10–15)

## 2018-03-31 PROCEDURE — 80048 BASIC METABOLIC PNL TOTAL CA: CPT | Performed by: PODIATRIST

## 2018-03-31 PROCEDURE — 36415 COLL VENOUS BLD VENIPUNCTURE: CPT

## 2018-03-31 PROCEDURE — 96365 THER/PROPH/DIAG IV INF INIT: CPT

## 2018-03-31 PROCEDURE — 96366 THER/PROPH/DIAG IV INF ADDON: CPT

## 2018-03-31 PROCEDURE — 25010000002 VANCOMYCIN PER 500 MG: Performed by: PODIATRIST

## 2018-03-31 PROCEDURE — 80202 ASSAY OF VANCOMYCIN: CPT | Performed by: PODIATRIST

## 2018-03-31 RX ORDER — SODIUM CHLORIDE 0.9 % (FLUSH) 0.9 %
SYRINGE (ML) INJECTION
Status: DISPENSED
Start: 2018-03-31 | End: 2018-03-31

## 2018-03-31 RX ADMIN — VANCOMYCIN HYDROCHLORIDE 2000 MG: 5 INJECTION, POWDER, LYOPHILIZED, FOR SOLUTION INTRAVENOUS at 08:14

## 2018-03-31 RX ADMIN — VANCOMYCIN HYDROCHLORIDE 2000 MG: 5 INJECTION, POWDER, LYOPHILIZED, FOR SOLUTION INTRAVENOUS at 19:17

## 2018-04-01 ENCOUNTER — INFUSION (OUTPATIENT)
Dept: PEDIATRICS | Facility: HOSPITAL | Age: 58
End: 2018-04-01

## 2018-04-01 VITALS
SYSTOLIC BLOOD PRESSURE: 110 MMHG | TEMPERATURE: 97.2 F | OXYGEN SATURATION: 97 % | HEART RATE: 72 BPM | RESPIRATION RATE: 16 BRPM | DIASTOLIC BLOOD PRESSURE: 72 MMHG

## 2018-04-01 VITALS
TEMPERATURE: 98.3 F | RESPIRATION RATE: 17 BRPM | SYSTOLIC BLOOD PRESSURE: 119 MMHG | DIASTOLIC BLOOD PRESSURE: 81 MMHG | HEART RATE: 75 BPM | OXYGEN SATURATION: 98 %

## 2018-04-01 DIAGNOSIS — M86.372 CHRONIC MULTIFOCAL OSTEOMYELITIS OF LEFT FOOT (HCC): Primary | ICD-10-CM

## 2018-04-01 PROCEDURE — 96366 THER/PROPH/DIAG IV INF ADDON: CPT

## 2018-04-01 PROCEDURE — 96365 THER/PROPH/DIAG IV INF INIT: CPT

## 2018-04-01 PROCEDURE — 25010000002 VANCOMYCIN PER 500 MG: Performed by: PODIATRIST

## 2018-04-01 RX ADMIN — VANCOMYCIN HYDROCHLORIDE 2000 MG: 5 INJECTION, POWDER, LYOPHILIZED, FOR SOLUTION INTRAVENOUS at 18:34

## 2018-04-01 RX ADMIN — VANCOMYCIN HYDROCHLORIDE 2000 MG: 5 INJECTION, POWDER, LYOPHILIZED, FOR SOLUTION INTRAVENOUS at 06:36

## 2018-04-02 ENCOUNTER — INFUSION (OUTPATIENT)
Dept: PEDIATRICS | Facility: HOSPITAL | Age: 58
End: 2018-04-02

## 2018-04-02 VITALS
HEART RATE: 81 BPM | TEMPERATURE: 98 F | DIASTOLIC BLOOD PRESSURE: 82 MMHG | OXYGEN SATURATION: 98 % | RESPIRATION RATE: 16 BRPM | SYSTOLIC BLOOD PRESSURE: 137 MMHG

## 2018-04-02 VITALS
DIASTOLIC BLOOD PRESSURE: 77 MMHG | RESPIRATION RATE: 18 BRPM | OXYGEN SATURATION: 98 % | SYSTOLIC BLOOD PRESSURE: 119 MMHG | HEART RATE: 80 BPM | TEMPERATURE: 97.6 F

## 2018-04-02 DIAGNOSIS — M86.372 CHRONIC MULTIFOCAL OSTEOMYELITIS OF LEFT FOOT (HCC): Primary | ICD-10-CM

## 2018-04-02 PROCEDURE — 25010000002 VANCOMYCIN PER 500 MG: Performed by: PODIATRIST

## 2018-04-02 PROCEDURE — 96365 THER/PROPH/DIAG IV INF INIT: CPT

## 2018-04-02 PROCEDURE — 96366 THER/PROPH/DIAG IV INF ADDON: CPT

## 2018-04-02 RX ADMIN — VANCOMYCIN HYDROCHLORIDE 2000 MG: 1 INJECTION, POWDER, LYOPHILIZED, FOR SOLUTION INTRAVENOUS at 19:46

## 2018-04-02 RX ADMIN — VANCOMYCIN HYDROCHLORIDE 2000 MG: 1 INJECTION, POWDER, LYOPHILIZED, FOR SOLUTION INTRAVENOUS at 07:34

## 2018-04-03 ENCOUNTER — INFUSION (OUTPATIENT)
Dept: PEDIATRICS | Facility: HOSPITAL | Age: 58
End: 2018-04-03

## 2018-04-03 ENCOUNTER — OFFICE VISIT (OUTPATIENT)
Dept: PODIATRY | Facility: CLINIC | Age: 58
End: 2018-04-03

## 2018-04-03 VITALS
TEMPERATURE: 98.5 F | OXYGEN SATURATION: 96 % | DIASTOLIC BLOOD PRESSURE: 88 MMHG | SYSTOLIC BLOOD PRESSURE: 141 MMHG | HEART RATE: 76 BPM | RESPIRATION RATE: 18 BRPM

## 2018-04-03 VITALS
TEMPERATURE: 98.4 F | RESPIRATION RATE: 18 BRPM | OXYGEN SATURATION: 97 % | SYSTOLIC BLOOD PRESSURE: 131 MMHG | DIASTOLIC BLOOD PRESSURE: 82 MMHG | HEART RATE: 84 BPM

## 2018-04-03 VITALS — BODY MASS INDEX: 35.44 KG/M2 | WEIGHT: 291.01 LBS | HEIGHT: 76 IN

## 2018-04-03 DIAGNOSIS — M86.372 CHRONIC MULTIFOCAL OSTEOMYELITIS OF LEFT FOOT (HCC): Primary | ICD-10-CM

## 2018-04-03 DIAGNOSIS — M86.8X7 OTHER OSTEOMYELITIS OF LEFT FOOT (HCC): Primary | ICD-10-CM

## 2018-04-03 DIAGNOSIS — T84.7XXD INFECTED HARDWARE IN LEFT LOWER EXTREMITY, SUBSEQUENT ENCOUNTER: Primary | ICD-10-CM

## 2018-04-03 DIAGNOSIS — M86.372 CHRONIC MULTIFOCAL OSTEOMYELITIS OF LEFT FOOT (HCC): ICD-10-CM

## 2018-04-03 DIAGNOSIS — L03.116 CELLULITIS OF LEFT FOOT: ICD-10-CM

## 2018-04-03 LAB
ANION GAP SERPL CALCULATED.3IONS-SCNC: 13 MMOL/L (ref 5–15)
BUN BLD-MCNC: 27 MG/DL (ref 7–21)
BUN/CREAT SERPL: 24.5 (ref 7–25)
CALCIUM SPEC-SCNC: 9.3 MG/DL (ref 8.4–10.2)
CHLORIDE SERPL-SCNC: 101 MMOL/L (ref 95–110)
CO2 SERPL-SCNC: 28 MMOL/L (ref 22–31)
CREAT BLD-MCNC: 1.1 MG/DL (ref 0.7–1.3)
GFR SERPL CREATININE-BSD FRML MDRD: 69 ML/MIN/1.73 (ref 60–130)
GLUCOSE BLD-MCNC: 96 MG/DL (ref 60–100)
POTASSIUM BLD-SCNC: 4.4 MMOL/L (ref 3.5–5.1)
SODIUM BLD-SCNC: 142 MMOL/L (ref 137–145)

## 2018-04-03 PROCEDURE — 25010000002 VANCOMYCIN PER 500 MG: Performed by: PODIATRIST

## 2018-04-03 PROCEDURE — 80048 BASIC METABOLIC PNL TOTAL CA: CPT | Performed by: PODIATRIST

## 2018-04-03 PROCEDURE — 96365 THER/PROPH/DIAG IV INF INIT: CPT

## 2018-04-03 PROCEDURE — 36415 COLL VENOUS BLD VENIPUNCTURE: CPT

## 2018-04-03 PROCEDURE — 96366 THER/PROPH/DIAG IV INF ADDON: CPT

## 2018-04-03 PROCEDURE — 99024 POSTOP FOLLOW-UP VISIT: CPT | Performed by: PODIATRIST

## 2018-04-03 RX ORDER — SODIUM CHLORIDE 0.9 % (FLUSH) 0.9 %
SYRINGE (ML) INJECTION
Status: COMPLETED
Start: 2018-04-03 | End: 2018-04-03

## 2018-04-03 RX ADMIN — Medication 10 ML: at 19:38

## 2018-04-03 RX ADMIN — VANCOMYCIN HYDROCHLORIDE 2000 MG: 1 INJECTION, POWDER, LYOPHILIZED, FOR SOLUTION INTRAVENOUS at 08:21

## 2018-04-03 RX ADMIN — VANCOMYCIN HYDROCHLORIDE 2000 MG: 1 INJECTION, POWDER, LYOPHILIZED, FOR SOLUTION INTRAVENOUS at 19:38

## 2018-04-03 NOTE — PROGRESS NOTES
Emre Lisa  1960  58 y.o. male   BS-86  Patient presents today for post op recheck of his left foot.     04/03/18  Chief Complaint   Patient presents with   • Left Foot - Post-op           History of Present Illness    Patient presents to clinic today for follow-up. Patient states that the dressing applied on his last visit he got caught on his toe and caused the second toe to bleed.  He denies pain.  He is currently ambulating in a surgical shoe.    Past Medical History:   Diagnosis Date   • Arthritis    • Atrial fibrillation    • Diabetes mellitus    • Diabetic foot ulcer associated with type 2 diabetes mellitus     left great toe   • Hallux malleus of left foot    • Hammer toe    • Hypertension    • MI (myocardial infarction)    • Neuropathy in diabetes    • Onychomycosis          Past Surgical History:   Procedure Laterality Date   • AMPUTATION DIGIT Right 3/5/2017    Procedure: RIGHT AMPUTATION TRANSMETATRASAL , RECESSION GASTROCNEMOUS;  Surgeon: Marco A Thompson DPM;  Location: [facility];  Service:    • CARDIAC DEFIBRILLATOR PLACEMENT  2010, 2016   • CARPAL TUNNEL RELEASE  2015    elbow and wrist left arm   • FOOT IRRIGATION, DEBRIDEMENT AND REPAIR Left 3/7/2018    Procedure: FOOT IRRIGATION, DEBRIDEMENT AND REPAIR;  Surgeon: Marco A Thompson DPM;  Location: [facility];  Service:    • FOOT IRRIGATION, DEBRIDEMENT AND REPAIR Left 3/10/2018    Procedure: FOOT IRRIGATION, DEBRIDEMENT AND REPAIR;  Surgeon: Marco A Thompson DPM;  Location: [facility];  Service: Podiatry   • FOOT SURGERY     • FOOT WOUND CLOSURE Right 3/2/2017    Procedure: INCISION AND DRAINAGE, RIGHT FOOT;  Surgeon: Tung Rosenthal DPM;  Location: [facility];  Service:    • HAMMER TOE REPAIR Left 2/15/2018    Procedure: HAMMERTOE CORRECTION LEFT SECOND, THIRD AND FOURTH TOES AND HALLUX INTERPHALANGEAL JOINT ARTHRODESIS LEFT FOOT (Micro Asnis, 4.0 & 5.0 Asnis)      (c-arm);  Surgeon: Marco A Thompson DPM;  Location: [facility];  Service:     • HARDWARE REMOVAL Left 3/5/2018    Procedure: ANKLE/FOOT HARDWARE REMOVAL;  Surgeon: Marco A Thompson DPM;  Location: [facility] OR;  Service:    • INCISION AND DRAINAGE LEG Left 3/5/2018    Procedure: INCISION AND DRAINAGE LOWER EXTREMITY;  Surgeon: Marco A Thompson DPM;  Location: [facility];  Service:    • TOE AMPUTATION Right 2016   • TONSILECTOMY, ADENOIDECTOMY, BILATERAL MYRINGOTOMY AND TUBES      age 25         Family History   Problem Relation Age of Onset   • Diabetes Father    • Stroke Father    • No Known Problems Maternal Grandmother    • No Known Problems Maternal Grandfather    • No Known Problems Paternal Grandmother    • No Known Problems Paternal Grandfather    • No Known Problems Son    • No Known Problems Daughter    • No Known Problems Daughter          Social History     Social History   • Marital status:      Spouse name: N/A   • Number of children: N/A   • Years of education: N/A     Occupational History   • Not on file.     Social History Main Topics   • Smoking status: Never Smoker   • Smokeless tobacco: Never Used   • Alcohol use No   • Drug use: No   • Sexual activity: Defer     Other Topics Concern   • Not on file     Social History Narrative   • No narrative on file         Current Outpatient Prescriptions   Medication Sig Dispense Refill   • aspirin 81 MG chewable tablet Chew 81 mg Every Night.     • Bacitracin Zinc (MAGIC BUTT OINTMENT) Apply 1 each topically 2 (Two) Times a Day. 120 g 1   • Cholecalciferol (VITAMIN D3) 5000 UNITS capsule capsule Take 5,000 Units by mouth Every Night.     • Cyanocobalamin (VITAMIN B 12 PO) Take 1,000 mcg by mouth Every Night.     • dronedarone (MULTAQ) 400 MG tablet Take 400 mg by mouth Every Night.     • fluticasone (FLONASE) 50 MCG/ACT nasal spray 2 sprays into each nostril As Needed for Rhinitis.     • glimepiride (AMARYL) 2 MG tablet Take 4 mg by mouth 2 (Two) Times a Day.     • HYDROcodone-acetaminophen (NORCO) 7.5-325 MG per tablet Take 1  "tablet by mouth Every 6 (Six) Hours As Needed for Moderate Pain . 30 tablet 0   • magnesium oxide (MAGOX) 400 (241.3 Mg) MG tablet tablet Take 400 mg by mouth 2 (Two) Times a Day.     • metFORMIN (GLUCOPHAGE) 1000 MG tablet Take 1,000 mg by mouth 2 (Two) Times a Day With Meals. Patient states he only uses when needed     • metoprolol tartrate (LOPRESSOR) 25 MG tablet Take 12.5 mg by mouth Every Night.     • ONE TOUCH ULTRA TEST test strip USE TO TEST BLOOD SUGAR THREE TIMES A DAY  1   • valsartan (DIOVAN) 160 MG tablet Take 40 mg by mouth Every Night. 1/2 tablet daily      • Vancomycin HCl in Dextrose (PHARMACY TO DOSE VANCOMYCIN) Continuous As Needed (pharmacy to dose vancomycin begining with second dose) for up to 33 days.     • vitamin C (ASCORBIC ACID) 500 MG tablet Take 500 mg by mouth Every Night.     • vitamin E 200 UNIT capsule Take 200 Units by mouth Every Night.       No current facility-administered medications for this visit.            OBJECTIVE    Ht 193 cm (75.98\")   Wt 132 kg (291 lb 0.1 oz)   BMI 35.44 kg/m²     Review of Systems   Constitutional: Negative for chills and fever.   Cardiovascular: Negative for chest pain.   Gastrointestinal: Negative for constipation, diarrhea, nausea and vomiting.         Constitutional: well developed, well nourished    HEENT: Normocephalic and atraumatic, normal hearing    Respiratory: Non labored respirations noted    LLE: Dorsal Incision sites are well approximated with no signs of dehiscence noted.  No drainage noted.  No surrounding erythema noted.  Hallux is slightly abducted.  Remaining digits are rectus.  Negative homans.  Second digit with small opening at the distal tip.  Implanted screw is visible.  No signs of infection noted.    Psychiatric: A&O x 3 with normal mood and affect. NAD.       Procedures        ASSESSMENT AND PLAN    Emre was seen today for post-op.    Diagnoses and all orders for this visit:    Infected hardware in left lower extremity, " subsequent encounter    Cellulitis of left foot      - Screw to the second digit was removed in the office.  Toe remains rectus.  4-0 nylon suture was used to reapproximate the small opening to the distal tip of the second digit.  - Sterile dressing applied.  Keep clean, dry and intact.  - Weightbearing as tolerated in surgical shoe  - Return to clinic in 1 week          This document has been electronically signed by Marco A Thompson DPM on April 3, 2018 5:38 PM

## 2018-04-04 ENCOUNTER — INFUSION (OUTPATIENT)
Dept: PEDIATRICS | Facility: HOSPITAL | Age: 58
End: 2018-04-04

## 2018-04-04 VITALS
SYSTOLIC BLOOD PRESSURE: 119 MMHG | RESPIRATION RATE: 18 BRPM | HEART RATE: 74 BPM | DIASTOLIC BLOOD PRESSURE: 78 MMHG | OXYGEN SATURATION: 98 % | TEMPERATURE: 98.3 F

## 2018-04-04 VITALS
HEART RATE: 80 BPM | OXYGEN SATURATION: 98 % | SYSTOLIC BLOOD PRESSURE: 140 MMHG | DIASTOLIC BLOOD PRESSURE: 82 MMHG | TEMPERATURE: 97.6 F | RESPIRATION RATE: 18 BRPM

## 2018-04-04 DIAGNOSIS — M86.372 CHRONIC MULTIFOCAL OSTEOMYELITIS OF LEFT FOOT (HCC): Primary | ICD-10-CM

## 2018-04-04 PROCEDURE — 96365 THER/PROPH/DIAG IV INF INIT: CPT

## 2018-04-04 PROCEDURE — 96366 THER/PROPH/DIAG IV INF ADDON: CPT

## 2018-04-04 PROCEDURE — 25010000002 VANCOMYCIN PER 500 MG: Performed by: PODIATRIST

## 2018-04-04 RX ADMIN — VANCOMYCIN HYDROCHLORIDE 2000 MG: 1 INJECTION, POWDER, LYOPHILIZED, FOR SOLUTION INTRAVENOUS at 19:01

## 2018-04-04 RX ADMIN — VANCOMYCIN HYDROCHLORIDE 2000 MG: 1 INJECTION, POWDER, LYOPHILIZED, FOR SOLUTION INTRAVENOUS at 07:10

## 2018-04-05 ENCOUNTER — INFUSION (OUTPATIENT)
Dept: PEDIATRICS | Facility: HOSPITAL | Age: 58
End: 2018-04-05

## 2018-04-05 VITALS
TEMPERATURE: 98.2 F | OXYGEN SATURATION: 98 % | DIASTOLIC BLOOD PRESSURE: 80 MMHG | SYSTOLIC BLOOD PRESSURE: 115 MMHG | HEART RATE: 74 BPM | RESPIRATION RATE: 18 BRPM

## 2018-04-05 VITALS
RESPIRATION RATE: 18 BRPM | TEMPERATURE: 98.4 F | DIASTOLIC BLOOD PRESSURE: 79 MMHG | SYSTOLIC BLOOD PRESSURE: 129 MMHG | OXYGEN SATURATION: 99 % | HEART RATE: 76 BPM

## 2018-04-05 DIAGNOSIS — M86.372 CHRONIC MULTIFOCAL OSTEOMYELITIS OF LEFT FOOT (HCC): Primary | ICD-10-CM

## 2018-04-05 LAB — VANCOMYCIN TROUGH SERPL-MCNC: 15.76 MCG/ML (ref 10–15)

## 2018-04-05 PROCEDURE — 96365 THER/PROPH/DIAG IV INF INIT: CPT

## 2018-04-05 PROCEDURE — 96366 THER/PROPH/DIAG IV INF ADDON: CPT

## 2018-04-05 PROCEDURE — 80202 ASSAY OF VANCOMYCIN: CPT | Performed by: PODIATRIST

## 2018-04-05 PROCEDURE — 36415 COLL VENOUS BLD VENIPUNCTURE: CPT

## 2018-04-05 PROCEDURE — 25010000002 VANCOMYCIN PER 500 MG: Performed by: PODIATRIST

## 2018-04-05 RX ORDER — SODIUM CHLORIDE 0.9 % (FLUSH) 0.9 %
SYRINGE (ML) INJECTION
Status: COMPLETED
Start: 2018-04-05 | End: 2018-04-05

## 2018-04-05 RX ADMIN — VANCOMYCIN HYDROCHLORIDE 2000 MG: 1 INJECTION, POWDER, LYOPHILIZED, FOR SOLUTION INTRAVENOUS at 08:26

## 2018-04-05 RX ADMIN — VANCOMYCIN HYDROCHLORIDE 2000 MG: 5 INJECTION, POWDER, LYOPHILIZED, FOR SOLUTION INTRAVENOUS at 19:25

## 2018-04-05 RX ADMIN — Medication 10 ML: at 19:30

## 2018-04-06 ENCOUNTER — INFUSION (OUTPATIENT)
Dept: PEDIATRICS | Facility: HOSPITAL | Age: 58
End: 2018-04-06

## 2018-04-06 VITALS
SYSTOLIC BLOOD PRESSURE: 137 MMHG | HEART RATE: 77 BPM | DIASTOLIC BLOOD PRESSURE: 81 MMHG | RESPIRATION RATE: 18 BRPM | TEMPERATURE: 97.9 F | OXYGEN SATURATION: 99 %

## 2018-04-06 VITALS
SYSTOLIC BLOOD PRESSURE: 100 MMHG | OXYGEN SATURATION: 100 % | TEMPERATURE: 98 F | HEART RATE: 82 BPM | RESPIRATION RATE: 18 BRPM | DIASTOLIC BLOOD PRESSURE: 76 MMHG

## 2018-04-06 DIAGNOSIS — M86.372 CHRONIC MULTIFOCAL OSTEOMYELITIS OF LEFT FOOT (HCC): Primary | ICD-10-CM

## 2018-04-06 PROCEDURE — 96365 THER/PROPH/DIAG IV INF INIT: CPT

## 2018-04-06 PROCEDURE — 96366 THER/PROPH/DIAG IV INF ADDON: CPT

## 2018-04-06 PROCEDURE — 25010000002 VANCOMYCIN PER 500 MG: Performed by: PODIATRIST

## 2018-04-06 RX ADMIN — VANCOMYCIN HYDROCHLORIDE 2000 MG: 1 INJECTION, POWDER, LYOPHILIZED, FOR SOLUTION INTRAVENOUS at 18:09

## 2018-04-06 RX ADMIN — VANCOMYCIN HYDROCHLORIDE 2000 MG: 1 INJECTION, POWDER, LYOPHILIZED, FOR SOLUTION INTRAVENOUS at 07:29

## 2018-04-07 ENCOUNTER — INFUSION (OUTPATIENT)
Dept: PEDIATRICS | Facility: HOSPITAL | Age: 58
End: 2018-04-07

## 2018-04-07 VITALS
HEART RATE: 74 BPM | TEMPERATURE: 98.6 F | DIASTOLIC BLOOD PRESSURE: 82 MMHG | OXYGEN SATURATION: 98 % | SYSTOLIC BLOOD PRESSURE: 131 MMHG | RESPIRATION RATE: 18 BRPM

## 2018-04-07 VITALS
TEMPERATURE: 97.6 F | SYSTOLIC BLOOD PRESSURE: 121 MMHG | RESPIRATION RATE: 18 BRPM | DIASTOLIC BLOOD PRESSURE: 76 MMHG | OXYGEN SATURATION: 100 % | HEART RATE: 79 BPM

## 2018-04-07 DIAGNOSIS — M86.372 CHRONIC MULTIFOCAL OSTEOMYELITIS OF LEFT FOOT (HCC): Primary | ICD-10-CM

## 2018-04-07 PROCEDURE — 96365 THER/PROPH/DIAG IV INF INIT: CPT

## 2018-04-07 PROCEDURE — 96366 THER/PROPH/DIAG IV INF ADDON: CPT

## 2018-04-07 PROCEDURE — 25010000002 VANCOMYCIN PER 500 MG: Performed by: PODIATRIST

## 2018-04-07 RX ADMIN — VANCOMYCIN HYDROCHLORIDE 2000 MG: 5 INJECTION, POWDER, LYOPHILIZED, FOR SOLUTION INTRAVENOUS at 19:01

## 2018-04-07 RX ADMIN — VANCOMYCIN HYDROCHLORIDE 2000 MG: 1 INJECTION, POWDER, LYOPHILIZED, FOR SOLUTION INTRAVENOUS at 07:25

## 2018-04-08 ENCOUNTER — INFUSION (OUTPATIENT)
Dept: PEDIATRICS | Facility: HOSPITAL | Age: 58
End: 2018-04-08

## 2018-04-08 VITALS
DIASTOLIC BLOOD PRESSURE: 70 MMHG | TEMPERATURE: 97.7 F | OXYGEN SATURATION: 99 % | HEART RATE: 81 BPM | SYSTOLIC BLOOD PRESSURE: 117 MMHG

## 2018-04-08 VITALS
OXYGEN SATURATION: 98 % | DIASTOLIC BLOOD PRESSURE: 83 MMHG | RESPIRATION RATE: 18 BRPM | SYSTOLIC BLOOD PRESSURE: 135 MMHG | HEART RATE: 86 BPM | TEMPERATURE: 98.5 F

## 2018-04-08 DIAGNOSIS — M86.372 CHRONIC MULTIFOCAL OSTEOMYELITIS OF LEFT FOOT (HCC): Primary | ICD-10-CM

## 2018-04-08 PROCEDURE — 25010000002 VANCOMYCIN PER 500 MG: Performed by: PODIATRIST

## 2018-04-08 PROCEDURE — 96366 THER/PROPH/DIAG IV INF ADDON: CPT

## 2018-04-08 PROCEDURE — 96365 THER/PROPH/DIAG IV INF INIT: CPT

## 2018-04-08 RX ADMIN — VANCOMYCIN HYDROCHLORIDE 2000 MG: 1 INJECTION, POWDER, LYOPHILIZED, FOR SOLUTION INTRAVENOUS at 19:41

## 2018-04-08 RX ADMIN — VANCOMYCIN HYDROCHLORIDE 2000 MG: 1 INJECTION, POWDER, LYOPHILIZED, FOR SOLUTION INTRAVENOUS at 08:24

## 2018-04-09 ENCOUNTER — INFUSION (OUTPATIENT)
Dept: PEDIATRICS | Facility: HOSPITAL | Age: 58
End: 2018-04-09

## 2018-04-09 VITALS
RESPIRATION RATE: 17 BRPM | DIASTOLIC BLOOD PRESSURE: 79 MMHG | OXYGEN SATURATION: 98 % | SYSTOLIC BLOOD PRESSURE: 122 MMHG | HEART RATE: 83 BPM | TEMPERATURE: 98 F

## 2018-04-09 VITALS
TEMPERATURE: 98.5 F | DIASTOLIC BLOOD PRESSURE: 83 MMHG | SYSTOLIC BLOOD PRESSURE: 123 MMHG | RESPIRATION RATE: 17 BRPM | HEART RATE: 88 BPM | OXYGEN SATURATION: 98 %

## 2018-04-09 DIAGNOSIS — M86.372 CHRONIC MULTIFOCAL OSTEOMYELITIS OF LEFT FOOT (HCC): Primary | ICD-10-CM

## 2018-04-09 PROCEDURE — 96365 THER/PROPH/DIAG IV INF INIT: CPT

## 2018-04-09 PROCEDURE — 96366 THER/PROPH/DIAG IV INF ADDON: CPT

## 2018-04-09 PROCEDURE — 25010000002 VANCOMYCIN PER 500 MG: Performed by: PODIATRIST

## 2018-04-09 RX ADMIN — VANCOMYCIN HYDROCHLORIDE 2000 MG: 5 INJECTION, POWDER, LYOPHILIZED, FOR SOLUTION INTRAVENOUS at 18:39

## 2018-04-09 RX ADMIN — VANCOMYCIN HYDROCHLORIDE 2000 MG: 5 INJECTION, POWDER, LYOPHILIZED, FOR SOLUTION INTRAVENOUS at 07:44

## 2018-04-10 ENCOUNTER — INFUSION (OUTPATIENT)
Dept: PEDIATRICS | Facility: HOSPITAL | Age: 58
End: 2018-04-10

## 2018-04-10 ENCOUNTER — OFFICE VISIT (OUTPATIENT)
Dept: PODIATRY | Facility: CLINIC | Age: 58
End: 2018-04-10

## 2018-04-10 VITALS
SYSTOLIC BLOOD PRESSURE: 120 MMHG | TEMPERATURE: 97.8 F | RESPIRATION RATE: 18 BRPM | DIASTOLIC BLOOD PRESSURE: 85 MMHG | OXYGEN SATURATION: 98 % | HEART RATE: 86 BPM

## 2018-04-10 VITALS
SYSTOLIC BLOOD PRESSURE: 119 MMHG | TEMPERATURE: 98.6 F | DIASTOLIC BLOOD PRESSURE: 76 MMHG | OXYGEN SATURATION: 96 % | HEART RATE: 81 BPM | RESPIRATION RATE: 18 BRPM

## 2018-04-10 VITALS — HEIGHT: 76 IN | OXYGEN SATURATION: 98 % | HEART RATE: 83 BPM | WEIGHT: 291 LBS | BODY MASS INDEX: 35.44 KG/M2

## 2018-04-10 DIAGNOSIS — T84.7XXD INFECTED HARDWARE IN LEFT LOWER EXTREMITY, SUBSEQUENT ENCOUNTER: Primary | ICD-10-CM

## 2018-04-10 DIAGNOSIS — M86.372 CHRONIC MULTIFOCAL OSTEOMYELITIS OF LEFT FOOT (HCC): Primary | ICD-10-CM

## 2018-04-10 LAB
ANION GAP SERPL CALCULATED.3IONS-SCNC: 12 MMOL/L (ref 5–15)
BUN BLD-MCNC: 31 MG/DL (ref 7–21)
BUN/CREAT SERPL: 33.7 (ref 7–25)
CALCIUM SPEC-SCNC: 9.4 MG/DL (ref 8.4–10.2)
CHLORIDE SERPL-SCNC: 96 MMOL/L (ref 95–110)
CO2 SERPL-SCNC: 29 MMOL/L (ref 22–31)
CREAT BLD-MCNC: 0.92 MG/DL (ref 0.7–1.3)
GFR SERPL CREATININE-BSD FRML MDRD: 85 ML/MIN/1.73 (ref 56–130)
GLUCOSE BLD-MCNC: 121 MG/DL (ref 60–100)
POTASSIUM BLD-SCNC: 4.2 MMOL/L (ref 3.5–5.1)
SODIUM BLD-SCNC: 137 MMOL/L (ref 137–145)
VANCOMYCIN TROUGH SERPL-MCNC: 18.72 MCG/ML (ref 10–15)

## 2018-04-10 PROCEDURE — 99024 POSTOP FOLLOW-UP VISIT: CPT | Performed by: PODIATRIST

## 2018-04-10 PROCEDURE — 96366 THER/PROPH/DIAG IV INF ADDON: CPT

## 2018-04-10 PROCEDURE — 96365 THER/PROPH/DIAG IV INF INIT: CPT

## 2018-04-10 PROCEDURE — 80202 ASSAY OF VANCOMYCIN: CPT | Performed by: PODIATRIST

## 2018-04-10 PROCEDURE — 25010000002 VANCOMYCIN PER 500 MG: Performed by: PODIATRIST

## 2018-04-10 PROCEDURE — 36415 COLL VENOUS BLD VENIPUNCTURE: CPT

## 2018-04-10 PROCEDURE — 80048 BASIC METABOLIC PNL TOTAL CA: CPT | Performed by: PODIATRIST

## 2018-04-10 RX ADMIN — VANCOMYCIN HYDROCHLORIDE 2000 MG: 1 INJECTION, POWDER, LYOPHILIZED, FOR SOLUTION INTRAVENOUS at 19:44

## 2018-04-10 RX ADMIN — VANCOMYCIN HYDROCHLORIDE 2000 MG: 1 INJECTION, POWDER, LYOPHILIZED, FOR SOLUTION INTRAVENOUS at 08:10

## 2018-04-10 NOTE — PROGRESS NOTES
Emre Lisa  1960  58 y.o. male     Patient presents today for post op recheck of his left foot.     04/10/18  Chief Complaint   Patient presents with   • Left Foot - post op recheck           History of Present Illness    Patient presents to clinic today for follow-up. His dressing is clean, dry and intact.  He denies pain.  He is currently ambulating in a surgical shoe.    Past Medical History:   Diagnosis Date   • Arthritis    • Atrial fibrillation    • Diabetes mellitus    • Diabetic foot ulcer associated with type 2 diabetes mellitus     left great toe   • Hallux malleus of left foot    • Hammer toe    • Hypertension    • MI (myocardial infarction)    • Neuropathy in diabetes    • Onychomycosis          Past Surgical History:   Procedure Laterality Date   • AMPUTATION DIGIT Right 3/5/2017    Procedure: RIGHT AMPUTATION TRANSMETATRASAL , RECESSION GASTROCNEMOUS;  Surgeon: Marco A Thompson DPM;  Location: [facility];  Service:    • CARDIAC DEFIBRILLATOR PLACEMENT  2010, 2016   • CARPAL TUNNEL RELEASE  2015    elbow and wrist left arm   • FOOT IRRIGATION, DEBRIDEMENT AND REPAIR Left 3/7/2018    Procedure: FOOT IRRIGATION, DEBRIDEMENT AND REPAIR;  Surgeon: Marco A Thompson DPM;  Location: [facility] OR;  Service:    • FOOT IRRIGATION, DEBRIDEMENT AND REPAIR Left 3/10/2018    Procedure: FOOT IRRIGATION, DEBRIDEMENT AND REPAIR;  Surgeon: Marco A Thompson DPM;  Location: [facility] OR;  Service: Podiatry   • FOOT SURGERY     • FOOT WOUND CLOSURE Right 3/2/2017    Procedure: INCISION AND DRAINAGE, RIGHT FOOT;  Surgeon: Tung Rosenthal DPM;  Location: [facility] OR;  Service:    • HAMMER TOE REPAIR Left 2/15/2018    Procedure: HAMMERTOE CORRECTION LEFT SECOND, THIRD AND FOURTH TOES AND HALLUX INTERPHALANGEAL JOINT ARTHRODESIS LEFT FOOT (Micro Asnis, 4.0 & 5.0 Asnis)      (c-arm);  Surgeon: Marco A Thompson DPM;  Location: [facility] OR;  Service:    • HARDWARE REMOVAL Left 3/5/2018    Procedure: ANKLE/FOOT HARDWARE REMOVAL;   Surgeon: Marco A Thompson DPM;  Location: [facility] OR;  Service:    • INCISION AND DRAINAGE LEG Left 3/5/2018    Procedure: INCISION AND DRAINAGE LOWER EXTREMITY;  Surgeon: Marco A Thompson DPM;  Location: [facility];  Service:    • TOE AMPUTATION Right 2016   • TONSILECTOMY, ADENOIDECTOMY, BILATERAL MYRINGOTOMY AND TUBES      age 25         Family History   Problem Relation Age of Onset   • Diabetes Father    • Stroke Father    • No Known Problems Maternal Grandmother    • No Known Problems Maternal Grandfather    • No Known Problems Paternal Grandmother    • No Known Problems Paternal Grandfather    • No Known Problems Son    • No Known Problems Daughter    • No Known Problems Daughter          Social History     Social History   • Marital status:      Spouse name: N/A   • Number of children: N/A   • Years of education: N/A     Occupational History   • Not on file.     Social History Main Topics   • Smoking status: Never Smoker   • Smokeless tobacco: Never Used   • Alcohol use No   • Drug use: No   • Sexual activity: Defer     Other Topics Concern   • Not on file     Social History Narrative   • No narrative on file         Current Outpatient Prescriptions   Medication Sig Dispense Refill   • aspirin 81 MG chewable tablet Chew 81 mg Every Night.     • Bacitracin Zinc (MAGIC BUTT OINTMENT) Apply 1 each topically 2 (Two) Times a Day. 120 g 1   • Cholecalciferol (VITAMIN D3) 5000 UNITS capsule capsule Take 5,000 Units by mouth Every Night.     • Cyanocobalamin (VITAMIN B 12 PO) Take 1,000 mcg by mouth Every Night.     • dronedarone (MULTAQ) 400 MG tablet Take 400 mg by mouth Every Night.     • fluticasone (FLONASE) 50 MCG/ACT nasal spray 2 sprays into each nostril As Needed for Rhinitis.     • glimepiride (AMARYL) 2 MG tablet Take 4 mg by mouth 2 (Two) Times a Day.     • HYDROcodone-acetaminophen (NORCO) 7.5-325 MG per tablet Take 1 tablet by mouth Every 6 (Six) Hours As Needed for Moderate Pain . 30 tablet 0   •  "magnesium oxide (MAGOX) 400 (241.3 Mg) MG tablet tablet Take 400 mg by mouth 2 (Two) Times a Day.     • metFORMIN (GLUCOPHAGE) 1000 MG tablet Take 1,000 mg by mouth 2 (Two) Times a Day With Meals. Patient states he only uses when needed     • metoprolol tartrate (LOPRESSOR) 25 MG tablet Take 12.5 mg by mouth Every Night.     • ONE TOUCH ULTRA TEST test strip USE TO TEST BLOOD SUGAR THREE TIMES A DAY  1   • valsartan (DIOVAN) 160 MG tablet Take 40 mg by mouth Every Night. 1/2 tablet daily      • Vancomycin HCl in Dextrose (PHARMACY TO DOSE VANCOMYCIN) Continuous As Needed (pharmacy to dose vancomycin begining with second dose) for up to 33 days.     • vitamin C (ASCORBIC ACID) 500 MG tablet Take 500 mg by mouth Every Night.     • vitamin E 200 UNIT capsule Take 200 Units by mouth Every Night.       No current facility-administered medications for this visit.            OBJECTIVE    Pulse 83   Ht 193 cm (76\")   Wt 132 kg (291 lb)   SpO2 98%   BMI 35.42 kg/m²     Review of Systems   Constitutional: Negative for chills and fever.   Cardiovascular: Negative for chest pain.   Gastrointestinal: Negative for constipation, diarrhea, nausea and vomiting.         Constitutional: well developed, well nourished    HEENT: Normocephalic and atraumatic, normal hearing    Respiratory: Non labored respirations noted    LLE: Dorsal Incision sites are well approximated with no signs of dehiscence noted.  No drainage noted.  No surrounding erythema noted.  Hallux is slightly abducted.  Remaining digits are rectus.  Negative homans.  Second digitDistal incision site is well approximated with no signs of infection.  Hyperkeratotic tissue noted to the distal tip of the left third digit.     Psychiatric: A&O x 3 with normal mood and affect. NAD.       Procedures        ASSESSMENT AND PLAN    Emre was seen today for post op recheck.    Diagnoses and all orders for this visit:    Infected hardware in left lower extremity, subsequent " encounter      - After debridement of hyperkeratotic lesion to distal tip of third digit screw is visible.  - Screw to the third digit was removed in the office.  Toe remains rectus.  4-0 nylon suture was used to reapproximate the small opening to the distal tip of the second digit.  - Sterile dressing applied.  Keep clean, dry and intact.  - We'll continue IV antibiotics for 2 more weeks.  - Weightbearing as tolerated in surgical shoe  - Return to clinic in 1 week          This document has been electronically signed by Marco A Thompson DPM on April 10, 2018 4:42 PM

## 2018-04-11 ENCOUNTER — INFUSION (OUTPATIENT)
Dept: PEDIATRICS | Facility: HOSPITAL | Age: 58
End: 2018-04-11

## 2018-04-11 VITALS
RESPIRATION RATE: 16 BRPM | TEMPERATURE: 98.5 F | SYSTOLIC BLOOD PRESSURE: 109 MMHG | DIASTOLIC BLOOD PRESSURE: 84 MMHG | OXYGEN SATURATION: 97 % | HEART RATE: 80 BPM

## 2018-04-11 VITALS
SYSTOLIC BLOOD PRESSURE: 111 MMHG | DIASTOLIC BLOOD PRESSURE: 71 MMHG | RESPIRATION RATE: 18 BRPM | OXYGEN SATURATION: 98 % | HEART RATE: 84 BPM | TEMPERATURE: 97.9 F

## 2018-04-11 DIAGNOSIS — M86.372 CHRONIC MULTIFOCAL OSTEOMYELITIS OF LEFT FOOT (HCC): Primary | ICD-10-CM

## 2018-04-11 PROCEDURE — 25010000002 VANCOMYCIN PER 500 MG: Performed by: PODIATRIST

## 2018-04-11 PROCEDURE — 96365 THER/PROPH/DIAG IV INF INIT: CPT

## 2018-04-11 PROCEDURE — 96366 THER/PROPH/DIAG IV INF ADDON: CPT

## 2018-04-11 RX ADMIN — VANCOMYCIN HYDROCHLORIDE 2000 MG: 1 INJECTION, POWDER, LYOPHILIZED, FOR SOLUTION INTRAVENOUS at 07:36

## 2018-04-11 RX ADMIN — VANCOMYCIN HYDROCHLORIDE 2000 MG: 1 INJECTION, POWDER, LYOPHILIZED, FOR SOLUTION INTRAVENOUS at 19:47

## 2018-04-12 ENCOUNTER — INFUSION (OUTPATIENT)
Dept: PEDIATRICS | Facility: HOSPITAL | Age: 58
End: 2018-04-12

## 2018-04-12 ENCOUNTER — HOSPITAL ENCOUNTER (OUTPATIENT)
Dept: GENERAL RADIOLOGY | Facility: HOSPITAL | Age: 58
Discharge: HOME | End: 2018-04-12

## 2018-04-12 ENCOUNTER — TRANSCRIBE ORDERS (OUTPATIENT)
Dept: GENERAL RADIOLOGY | Facility: HOSPITAL | Age: 58
End: 2018-04-12

## 2018-04-12 ENCOUNTER — HOSPITAL ENCOUNTER (OUTPATIENT)
Dept: ULTRASOUND IMAGING | Facility: HOSPITAL | Age: 58
Discharge: HOME | End: 2018-04-12

## 2018-04-12 ENCOUNTER — TELEPHONE (OUTPATIENT)
Dept: PODIATRY | Facility: CLINIC | Age: 58
End: 2018-04-12

## 2018-04-12 ENCOUNTER — HOSPITAL ENCOUNTER (OUTPATIENT)
Dept: INTERVENTIONAL RADIOLOGY/VASCULAR | Facility: HOSPITAL | Age: 58
Discharge: HOME | End: 2018-04-12
Admitting: PODIATRIST

## 2018-04-12 VITALS
TEMPERATURE: 97.3 F | DIASTOLIC BLOOD PRESSURE: 82 MMHG | SYSTOLIC BLOOD PRESSURE: 119 MMHG | RESPIRATION RATE: 20 BRPM | OXYGEN SATURATION: 98 % | HEART RATE: 84 BPM

## 2018-04-12 VITALS
SYSTOLIC BLOOD PRESSURE: 113 MMHG | HEART RATE: 105 BPM | DIASTOLIC BLOOD PRESSURE: 75 MMHG | TEMPERATURE: 98.5 F | OXYGEN SATURATION: 96 % | RESPIRATION RATE: 18 BRPM

## 2018-04-12 DIAGNOSIS — M86.372 CHRONIC MULTIFOCAL OSTEOMYELITIS OF LEFT FOOT (HCC): Primary | ICD-10-CM

## 2018-04-12 DIAGNOSIS — T80.1XXA IV INFILTRATION, INITIAL ENCOUNTER: ICD-10-CM

## 2018-04-12 DIAGNOSIS — M86.272 SUBACUTE OSTEOMYELITIS OF LEFT FOOT (HCC): Primary | ICD-10-CM

## 2018-04-12 DIAGNOSIS — T80.1XXA IV INFILTRATION, INITIAL ENCOUNTER: Primary | ICD-10-CM

## 2018-04-12 PROCEDURE — 25010000002 VANCOMYCIN PER 500 MG: Performed by: PODIATRIST

## 2018-04-12 PROCEDURE — 96365 THER/PROPH/DIAG IV INF INIT: CPT

## 2018-04-12 PROCEDURE — C1751 CATH, INF, PER/CENT/MIDLINE: HCPCS

## 2018-04-12 PROCEDURE — 76937 US GUIDE VASCULAR ACCESS: CPT

## 2018-04-12 PROCEDURE — 96366 THER/PROPH/DIAG IV INF ADDON: CPT

## 2018-04-12 RX ADMIN — VANCOMYCIN HYDROCHLORIDE 2000 MG: 1 INJECTION, POWDER, LYOPHILIZED, FOR SOLUTION INTRAVENOUS at 17:16

## 2018-04-12 RX ADMIN — VANCOMYCIN HYDROCHLORIDE 2000 MG: 1 INJECTION, POWDER, LYOPHILIZED, FOR SOLUTION INTRAVENOUS at 09:04

## 2018-04-12 NOTE — PROGRESS NOTES
TWO PATIENT IDENTIFIERS WERE USED. CONSENT WAS SIGNED PER PATIENT EDUCATION MATERIAL WAS GIVEN TO PATIENT AND / OR FAMILY. THE PATIENT WAS DRAPED WITH FULL BODY DRAPE AND PATIENT'S RIGHT ARM WAS PREPPED WITH CHLORAPREP.  ULTRASOUND WAS USED TO LOCALIZE THERIGHT BASILIC  VEIN. SUBCUTANEOUS TISSUE AT THE CATHETER SITE WAS INFILTRATED WITH 2% LIDOCAINE. UNDER ULTRASOUND GUIDANCE, THE VEIN WAS ACCESSED WITH A 21GAUGE  NEEDLE. AN 0.018 WIRE WAS THEN THREADED THROUGH THE NEEDLE INTO THE CENTRAL VENOUS SYSTEM. THE 21GAUGE  NEEDLE WAS REMOVED AND A 4 [place] PEEL AWAY SHEATH WAS PLACED OVER THE WIRE. THE PICC LINE CATHETER WAS CUT AT 44 CM. THE PICC LINE CATHETER WAS THEN PLACED OVER THE WIRE INTO THE VEIN, THE SHEATH WAS PEELED AWAY,WIRE WAS REMOVED. CATHETER WAS FLUSHED WITH NORMAL SALINE AND TIPS APPLIED. BIOPATCH PLACED. CATHETER SECURED WITH STATLOCK AND TEGADERM. PATIENT TOLERATED PROCEDURE WELL. THIS WAS DONE IN THE    OTHER      IMPRESSION: SUCCESSFUL PLACEMENT OF SINGLE LUMEN SOLO PICC        Thu Lugo  4/12/2018  4:16 PM

## 2018-04-12 NOTE — TELEPHONE ENCOUNTER
DR BUENO    PATIENT ASKED ME TO GIVE YOU THE FOLLOWING MESSAGE:    HE WILL HAVE TO GET STUCK TONITE.    TWICE TOMORROW    TWICE Saturday     TWICE ON Sunday.    AND PROBABLY ONCE ON Monday.    HE DID HIS PART.  YOU NEED TO STEP UP AND DO YOUR PART.

## 2018-04-13 ENCOUNTER — INFUSION (OUTPATIENT)
Dept: PEDIATRICS | Facility: HOSPITAL | Age: 58
End: 2018-04-13

## 2018-04-13 VITALS
DIASTOLIC BLOOD PRESSURE: 77 MMHG | HEART RATE: 83 BPM | OXYGEN SATURATION: 97 % | RESPIRATION RATE: 18 BRPM | TEMPERATURE: 97 F | SYSTOLIC BLOOD PRESSURE: 125 MMHG

## 2018-04-13 VITALS
HEART RATE: 85 BPM | RESPIRATION RATE: 18 BRPM | OXYGEN SATURATION: 97 % | DIASTOLIC BLOOD PRESSURE: 83 MMHG | SYSTOLIC BLOOD PRESSURE: 125 MMHG | TEMPERATURE: 98.1 F

## 2018-04-13 DIAGNOSIS — M86.372 CHRONIC MULTIFOCAL OSTEOMYELITIS OF LEFT FOOT (HCC): Primary | ICD-10-CM

## 2018-04-13 PROCEDURE — 25010000002 VANCOMYCIN PER 500 MG: Performed by: PODIATRIST

## 2018-04-13 PROCEDURE — 96366 THER/PROPH/DIAG IV INF ADDON: CPT

## 2018-04-13 PROCEDURE — 96365 THER/PROPH/DIAG IV INF INIT: CPT

## 2018-04-13 RX ADMIN — VANCOMYCIN HYDROCHLORIDE 2000 MG: 1 INJECTION, POWDER, LYOPHILIZED, FOR SOLUTION INTRAVENOUS at 17:44

## 2018-04-13 RX ADMIN — VANCOMYCIN HYDROCHLORIDE 2000 MG: 5 INJECTION, POWDER, LYOPHILIZED, FOR SOLUTION INTRAVENOUS at 07:27

## 2018-04-14 ENCOUNTER — INFUSION (OUTPATIENT)
Dept: PEDIATRICS | Facility: HOSPITAL | Age: 58
End: 2018-04-14

## 2018-04-14 VITALS
TEMPERATURE: 97.1 F | WEIGHT: 280 LBS | DIASTOLIC BLOOD PRESSURE: 88 MMHG | SYSTOLIC BLOOD PRESSURE: 127 MMHG | RESPIRATION RATE: 18 BRPM | HEIGHT: 76 IN | HEART RATE: 78 BPM | OXYGEN SATURATION: 99 % | BODY MASS INDEX: 34.1 KG/M2

## 2018-04-14 VITALS
HEART RATE: 76 BPM | BODY MASS INDEX: 33.49 KG/M2 | RESPIRATION RATE: 18 BRPM | OXYGEN SATURATION: 98 % | DIASTOLIC BLOOD PRESSURE: 75 MMHG | SYSTOLIC BLOOD PRESSURE: 113 MMHG | TEMPERATURE: 99.6 F | WEIGHT: 275 LBS | HEIGHT: 76 IN

## 2018-04-14 DIAGNOSIS — M86.372 CHRONIC MULTIFOCAL OSTEOMYELITIS OF LEFT FOOT (HCC): Primary | ICD-10-CM

## 2018-04-14 PROCEDURE — 96365 THER/PROPH/DIAG IV INF INIT: CPT

## 2018-04-14 PROCEDURE — 96366 THER/PROPH/DIAG IV INF ADDON: CPT

## 2018-04-14 PROCEDURE — 25010000002 VANCOMYCIN PER 500 MG: Performed by: PODIATRIST

## 2018-04-14 RX ADMIN — VANCOMYCIN HYDROCHLORIDE 2000 MG: 5 INJECTION, POWDER, LYOPHILIZED, FOR SOLUTION INTRAVENOUS at 08:37

## 2018-04-14 RX ADMIN — VANCOMYCIN HYDROCHLORIDE 2000 MG: 1 INJECTION, POWDER, LYOPHILIZED, FOR SOLUTION INTRAVENOUS at 17:51

## 2018-04-15 ENCOUNTER — INFUSION (OUTPATIENT)
Dept: PEDIATRICS | Facility: HOSPITAL | Age: 58
End: 2018-04-15

## 2018-04-15 VITALS
HEART RATE: 82 BPM | SYSTOLIC BLOOD PRESSURE: 136 MMHG | OXYGEN SATURATION: 99 % | RESPIRATION RATE: 18 BRPM | DIASTOLIC BLOOD PRESSURE: 82 MMHG | TEMPERATURE: 97.5 F

## 2018-04-15 DIAGNOSIS — M86.372 CHRONIC MULTIFOCAL OSTEOMYELITIS OF LEFT FOOT (HCC): Primary | ICD-10-CM

## 2018-04-15 PROCEDURE — 80048 BASIC METABOLIC PNL TOTAL CA: CPT | Performed by: NURSE ANESTHETIST, CERTIFIED REGISTERED

## 2018-04-15 PROCEDURE — 96365 THER/PROPH/DIAG IV INF INIT: CPT

## 2018-04-15 PROCEDURE — 96366 THER/PROPH/DIAG IV INF ADDON: CPT

## 2018-04-15 PROCEDURE — 80202 ASSAY OF VANCOMYCIN: CPT | Performed by: NURSE ANESTHETIST, CERTIFIED REGISTERED

## 2018-04-15 PROCEDURE — 25010000002 VANCOMYCIN PER 500 MG: Performed by: PODIATRIST

## 2018-04-15 RX ADMIN — VANCOMYCIN HYDROCHLORIDE 2000 MG: 1 INJECTION, POWDER, LYOPHILIZED, FOR SOLUTION INTRAVENOUS at 20:04

## 2018-04-16 ENCOUNTER — INFUSION (OUTPATIENT)
Dept: PEDIATRICS | Facility: HOSPITAL | Age: 58
End: 2018-04-16

## 2018-04-16 VITALS
OXYGEN SATURATION: 97 % | RESPIRATION RATE: 20 BRPM | HEART RATE: 84 BPM | SYSTOLIC BLOOD PRESSURE: 125 MMHG | DIASTOLIC BLOOD PRESSURE: 89 MMHG | TEMPERATURE: 97.9 F

## 2018-04-16 VITALS
OXYGEN SATURATION: 97 % | RESPIRATION RATE: 18 BRPM | HEART RATE: 72 BPM | TEMPERATURE: 98 F | SYSTOLIC BLOOD PRESSURE: 114 MMHG | DIASTOLIC BLOOD PRESSURE: 73 MMHG

## 2018-04-16 DIAGNOSIS — M86.372 CHRONIC MULTIFOCAL OSTEOMYELITIS OF LEFT FOOT (HCC): Primary | ICD-10-CM

## 2018-04-16 PROCEDURE — 25010000002 VANCOMYCIN PER 500 MG: Performed by: PODIATRIST

## 2018-04-16 PROCEDURE — 96365 THER/PROPH/DIAG IV INF INIT: CPT

## 2018-04-16 PROCEDURE — 96366 THER/PROPH/DIAG IV INF ADDON: CPT

## 2018-04-16 RX ORDER — SODIUM CHLORIDE 0.9 % (FLUSH) 0.9 %
SYRINGE (ML) INJECTION
Status: COMPLETED
Start: 2018-04-16 | End: 2018-04-16

## 2018-04-16 RX ADMIN — Medication 10 ML: at 18:45

## 2018-04-16 RX ADMIN — VANCOMYCIN HYDROCHLORIDE 2000 MG: 1 INJECTION, POWDER, LYOPHILIZED, FOR SOLUTION INTRAVENOUS at 18:47

## 2018-04-16 RX ADMIN — VANCOMYCIN HYDROCHLORIDE 2000 MG: 1 INJECTION, POWDER, LYOPHILIZED, FOR SOLUTION INTRAVENOUS at 07:04

## 2018-04-17 ENCOUNTER — INFUSION (OUTPATIENT)
Dept: PEDIATRICS | Facility: HOSPITAL | Age: 58
End: 2018-04-17

## 2018-04-17 VITALS
OXYGEN SATURATION: 97 % | DIASTOLIC BLOOD PRESSURE: 90 MMHG | HEART RATE: 88 BPM | RESPIRATION RATE: 18 BRPM | TEMPERATURE: 98.2 F | SYSTOLIC BLOOD PRESSURE: 131 MMHG

## 2018-04-17 VITALS
RESPIRATION RATE: 20 BRPM | OXYGEN SATURATION: 96 % | SYSTOLIC BLOOD PRESSURE: 109 MMHG | HEART RATE: 80 BPM | TEMPERATURE: 98 F | DIASTOLIC BLOOD PRESSURE: 78 MMHG

## 2018-04-17 DIAGNOSIS — M86.372 CHRONIC MULTIFOCAL OSTEOMYELITIS OF LEFT FOOT (HCC): Primary | ICD-10-CM

## 2018-04-17 PROCEDURE — 25010000002 VANCOMYCIN PER 500 MG: Performed by: PODIATRIST

## 2018-04-17 PROCEDURE — 96365 THER/PROPH/DIAG IV INF INIT: CPT

## 2018-04-17 PROCEDURE — 96366 THER/PROPH/DIAG IV INF ADDON: CPT

## 2018-04-17 RX ADMIN — VANCOMYCIN HYDROCHLORIDE 2000 MG: 5 INJECTION, POWDER, LYOPHILIZED, FOR SOLUTION INTRAVENOUS at 19:25

## 2018-04-17 RX ADMIN — VANCOMYCIN HYDROCHLORIDE 2000 MG: 1 INJECTION, POWDER, LYOPHILIZED, FOR SOLUTION INTRAVENOUS at 06:45

## 2018-04-18 ENCOUNTER — OFFICE VISIT (OUTPATIENT)
Dept: PODIATRY | Facility: CLINIC | Age: 58
End: 2018-04-18

## 2018-04-18 ENCOUNTER — INFUSION (OUTPATIENT)
Dept: PEDIATRICS | Facility: HOSPITAL | Age: 58
End: 2018-04-18

## 2018-04-18 VITALS
RESPIRATION RATE: 18 BRPM | HEART RATE: 84 BPM | OXYGEN SATURATION: 97 % | SYSTOLIC BLOOD PRESSURE: 124 MMHG | TEMPERATURE: 97.6 F | DIASTOLIC BLOOD PRESSURE: 81 MMHG

## 2018-04-18 VITALS — HEIGHT: 76 IN | WEIGHT: 280 LBS | HEART RATE: 98 BPM | OXYGEN SATURATION: 98 % | BODY MASS INDEX: 34.1 KG/M2

## 2018-04-18 DIAGNOSIS — M86.272 SUBACUTE OSTEOMYELITIS OF LEFT FOOT (HCC): Primary | ICD-10-CM

## 2018-04-18 DIAGNOSIS — M86.372 CHRONIC MULTIFOCAL OSTEOMYELITIS OF LEFT FOOT (HCC): Primary | ICD-10-CM

## 2018-04-18 DIAGNOSIS — T84.7XXD INFECTED HARDWARE IN LEFT LOWER EXTREMITY, SUBSEQUENT ENCOUNTER: ICD-10-CM

## 2018-04-18 PROCEDURE — 25010000002 VANCOMYCIN PER 500 MG: Performed by: PODIATRIST

## 2018-04-18 PROCEDURE — 96366 THER/PROPH/DIAG IV INF ADDON: CPT

## 2018-04-18 PROCEDURE — 96365 THER/PROPH/DIAG IV INF INIT: CPT

## 2018-04-18 PROCEDURE — 99024 POSTOP FOLLOW-UP VISIT: CPT | Performed by: PODIATRIST

## 2018-04-18 RX ORDER — DOXYCYCLINE HYCLATE 100 MG/1
100 CAPSULE ORAL 2 TIMES DAILY
Qty: 28 CAPSULE | Refills: 0 | Status: SHIPPED | OUTPATIENT
Start: 2018-04-18 | End: 2019-11-11

## 2018-04-18 RX ADMIN — VANCOMYCIN HYDROCHLORIDE 2000 MG: 1 INJECTION, POWDER, LYOPHILIZED, FOR SOLUTION INTRAVENOUS at 07:41

## 2018-04-18 NOTE — PROGRESS NOTES
Emre Lisa  1960  58 y.o. male     Patient presents today for post op recheck of his left foot.     04/18/18  Chief Complaint   Patient presents with   • Left Foot - post op recheck           History of Present Illness    Patient presents to clinic today for follow-up. His dressing is clean, dry and intact.  He denies pain.  He is currently ambulating in a surgical shoe.    Past Medical History:   Diagnosis Date   • Arthritis    • Atrial fibrillation    • Diabetes mellitus    • Diabetic foot ulcer associated with type 2 diabetes mellitus     left great toe   • Hallux malleus of left foot    • Hammer toe    • Hypertension    • MI (myocardial infarction)    • Neuropathy in diabetes    • Onychomycosis          Past Surgical History:   Procedure Laterality Date   • AMPUTATION DIGIT Right 3/5/2017    Procedure: RIGHT AMPUTATION TRANSMETATRASAL , RECESSION GASTROCNEMOUS;  Surgeon: Marco A Thompson DPM;  Location: [facility];  Service:    • CARDIAC DEFIBRILLATOR PLACEMENT  2010, 2016   • CARPAL TUNNEL RELEASE  2015    elbow and wrist left arm   • FOOT IRRIGATION, DEBRIDEMENT AND REPAIR Left 3/7/2018    Procedure: FOOT IRRIGATION, DEBRIDEMENT AND REPAIR;  Surgeon: Marco A Thompson DPM;  Location: [facility] OR;  Service:    • FOOT IRRIGATION, DEBRIDEMENT AND REPAIR Left 3/10/2018    Procedure: FOOT IRRIGATION, DEBRIDEMENT AND REPAIR;  Surgeon: Marco A Thompson DPM;  Location: [facility] OR;  Service: Podiatry   • FOOT SURGERY     • FOOT WOUND CLOSURE Right 3/2/2017    Procedure: INCISION AND DRAINAGE, RIGHT FOOT;  Surgeon: Tung Rosenthal DPM;  Location: [facility] OR;  Service:    • HAMMER TOE REPAIR Left 2/15/2018    Procedure: HAMMERTOE CORRECTION LEFT SECOND, THIRD AND FOURTH TOES AND HALLUX INTERPHALANGEAL JOINT ARTHRODESIS LEFT FOOT (Micro Asnis, 4.0 & 5.0 Asnis)      (c-arm);  Surgeon: Marco A Thompson DPM;  Location: [facility] OR;  Service:    • HARDWARE REMOVAL Left 3/5/2018    Procedure: ANKLE/FOOT HARDWARE REMOVAL;   Surgeon: Marco A Thompson DPM;  Location: [facility] OR;  Service:    • INCISION AND DRAINAGE LEG Left 3/5/2018    Procedure: INCISION AND DRAINAGE LOWER EXTREMITY;  Surgeon: Marco A Thompson DPM;  Location: [facility];  Service:    • TOE AMPUTATION Right 2016   • TONSILECTOMY, ADENOIDECTOMY, BILATERAL MYRINGOTOMY AND TUBES      age 25         Family History   Problem Relation Age of Onset   • Diabetes Father    • Stroke Father    • No Known Problems Maternal Grandmother    • No Known Problems Maternal Grandfather    • No Known Problems Paternal Grandmother    • No Known Problems Paternal Grandfather    • No Known Problems Son    • No Known Problems Daughter    • No Known Problems Daughter          Social History     Social History   • Marital status:      Spouse name: N/A   • Number of children: N/A   • Years of education: N/A     Occupational History   • Not on file.     Social History Main Topics   • Smoking status: Never Smoker   • Smokeless tobacco: Never Used   • Alcohol use No   • Drug use: No   • Sexual activity: Defer     Other Topics Concern   • Not on file     Social History Narrative   • No narrative on file         Current Outpatient Prescriptions   Medication Sig Dispense Refill   • aspirin 81 MG chewable tablet Chew 81 mg Every Night.     • Bacitracin Zinc (MAGIC BUTT OINTMENT) Apply 1 each topically 2 (Two) Times a Day. 120 g 1   • Cholecalciferol (VITAMIN D3) 5000 UNITS capsule capsule Take 5,000 Units by mouth Every Night.     • Cyanocobalamin (VITAMIN B 12 PO) Take 1,000 mcg by mouth Every Night.     • dronedarone (MULTAQ) 400 MG tablet Take 400 mg by mouth Every Night.     • fluticasone (FLONASE) 50 MCG/ACT nasal spray 2 sprays into each nostril As Needed for Rhinitis.     • glimepiride (AMARYL) 2 MG tablet Take 4 mg by mouth 2 (Two) Times a Day.     • HYDROcodone-acetaminophen (NORCO) 7.5-325 MG per tablet Take 1 tablet by mouth Every 6 (Six) Hours As Needed for Moderate Pain . 30 tablet 0   •  "magnesium oxide (MAGOX) 400 (241.3 Mg) MG tablet tablet Take 400 mg by mouth 2 (Two) Times a Day.     • metFORMIN (GLUCOPHAGE) 1000 MG tablet Take 1,000 mg by mouth 2 (Two) Times a Day With Meals. Patient states he only uses when needed     • metoprolol tartrate (LOPRESSOR) 25 MG tablet Take 12.5 mg by mouth Every Night.     • ONE TOUCH ULTRA TEST test strip USE TO TEST BLOOD SUGAR THREE TIMES A DAY  1   • valsartan (DIOVAN) 160 MG tablet Take 40 mg by mouth Every Night. 1/2 tablet daily      • vitamin C (ASCORBIC ACID) 500 MG tablet Take 500 mg by mouth Every Night.     • vitamin E 200 UNIT capsule Take 200 Units by mouth Every Night.     • doxycycline (VIBRAMYCIN) 100 MG capsule Take 1 capsule by mouth 2 (Two) Times a Day. 28 capsule 0     No current facility-administered medications for this visit.            OBJECTIVE    Pulse 98   Ht 193 cm (76\")   Wt 127 kg (280 lb)   SpO2 98%   BMI 34.08 kg/m²     Review of Systems   Constitutional: Negative for chills and fever.   Cardiovascular: Negative for chest pain.   Gastrointestinal: Negative for constipation, diarrhea, nausea and vomiting.         Constitutional: well developed, well nourished    HEENT: Normocephalic and atraumatic, normal hearing    Respiratory: Non labored respirations noted    LLE: Dorsal Incision sites are well approximated with no signs of dehiscence noted.  No drainage noted.  No surrounding erythema noted.  Hallux is slightly abducted.  Remaining digits are rectus.  Negative homans.       Psychiatric: A&O x 3 with normal mood and affect. NAD.       Procedures        ASSESSMENT AND PLAN    Emre was seen today for post op recheck.    Diagnoses and all orders for this visit:    Subacute osteomyelitis of left foot  -     XR Foot 3+ View Left    Infected hardware in left lower extremity, subsequent encounter  -     XR Foot 3+ View Left    Other orders  -     doxycycline (VIBRAMYCIN) 100 MG capsule; Take 1 capsule by mouth 2 (Two) Times a " Day.      - Sutures to hallux were removed  - Continue IV antibiotics ×1 week  - Rx for doxycycline ×2 weeks following discontinuation of IV antibiotics  - Weightbearing as tolerated in surgical shoe  - Return to clinic in 2 weeks          This document has been electronically signed by Marco A Thompson DPM on April 18, 2018 12:04 PM

## 2018-04-19 ENCOUNTER — INFUSION (OUTPATIENT)
Dept: PEDIATRICS | Facility: HOSPITAL | Age: 58
End: 2018-04-19

## 2018-04-19 VITALS
HEART RATE: 80 BPM | DIASTOLIC BLOOD PRESSURE: 70 MMHG | SYSTOLIC BLOOD PRESSURE: 107 MMHG | OXYGEN SATURATION: 98 % | TEMPERATURE: 97.2 F | RESPIRATION RATE: 18 BRPM

## 2018-04-19 VITALS
SYSTOLIC BLOOD PRESSURE: 116 MMHG | TEMPERATURE: 98.5 F | DIASTOLIC BLOOD PRESSURE: 73 MMHG | OXYGEN SATURATION: 98 % | HEART RATE: 78 BPM | RESPIRATION RATE: 18 BRPM

## 2018-04-19 DIAGNOSIS — M86.372 CHRONIC MULTIFOCAL OSTEOMYELITIS OF LEFT FOOT (HCC): Primary | ICD-10-CM

## 2018-04-19 PROCEDURE — 96366 THER/PROPH/DIAG IV INF ADDON: CPT

## 2018-04-19 PROCEDURE — 96365 THER/PROPH/DIAG IV INF INIT: CPT

## 2018-04-19 PROCEDURE — 25010000002 VANCOMYCIN PER 500 MG: Performed by: PODIATRIST

## 2018-04-19 RX ORDER — SODIUM CHLORIDE 0.9 % (FLUSH) 0.9 %
SYRINGE (ML) INJECTION
Status: COMPLETED
Start: 2018-04-19 | End: 2018-04-19

## 2018-04-19 RX ADMIN — VANCOMYCIN HYDROCHLORIDE 2000 MG: 1 INJECTION, POWDER, LYOPHILIZED, FOR SOLUTION INTRAVENOUS at 18:14

## 2018-04-19 RX ADMIN — Medication 10 ML: at 18:14

## 2018-04-19 RX ADMIN — VANCOMYCIN HYDROCHLORIDE 2000 MG: 5 INJECTION, POWDER, LYOPHILIZED, FOR SOLUTION INTRAVENOUS at 07:48

## 2018-04-20 ENCOUNTER — INFUSION (OUTPATIENT)
Dept: PEDIATRICS | Facility: HOSPITAL | Age: 58
End: 2018-04-20

## 2018-04-20 VITALS
HEART RATE: 86 BPM | SYSTOLIC BLOOD PRESSURE: 117 MMHG | OXYGEN SATURATION: 97 % | TEMPERATURE: 98 F | DIASTOLIC BLOOD PRESSURE: 74 MMHG | RESPIRATION RATE: 18 BRPM

## 2018-04-20 VITALS
OXYGEN SATURATION: 98 % | HEART RATE: 78 BPM | DIASTOLIC BLOOD PRESSURE: 72 MMHG | RESPIRATION RATE: 20 BRPM | SYSTOLIC BLOOD PRESSURE: 117 MMHG | TEMPERATURE: 98.2 F

## 2018-04-20 DIAGNOSIS — M86.372 CHRONIC MULTIFOCAL OSTEOMYELITIS OF LEFT FOOT (HCC): ICD-10-CM

## 2018-04-20 DIAGNOSIS — M86.372 CHRONIC MULTIFOCAL OSTEOMYELITIS OF LEFT FOOT (HCC): Primary | ICD-10-CM

## 2018-04-20 DIAGNOSIS — M86.172 OSTEOMYELITIS OF FOOT, LEFT, ACUTE (HCC): Primary | ICD-10-CM

## 2018-04-20 LAB
ANION GAP SERPL CALCULATED.3IONS-SCNC: 14 MMOL/L (ref 5–15)
BUN BLD-MCNC: 25 MG/DL (ref 7–21)
BUN/CREAT SERPL: 28.4 (ref 7–25)
CALCIUM SPEC-SCNC: 9.5 MG/DL (ref 8.4–10.2)
CHLORIDE SERPL-SCNC: 100 MMOL/L (ref 95–110)
CO2 SERPL-SCNC: 26 MMOL/L (ref 22–31)
CREAT BLD-MCNC: 0.88 MG/DL (ref 0.7–1.3)
GFR SERPL CREATININE-BSD FRML MDRD: 90 ML/MIN/1.73 (ref 60–130)
GLUCOSE BLD-MCNC: 93 MG/DL (ref 60–100)
POTASSIUM BLD-SCNC: 4.6 MMOL/L (ref 3.5–5.1)
SODIUM BLD-SCNC: 140 MMOL/L (ref 137–145)
VANCOMYCIN TROUGH SERPL-MCNC: 15.77 MCG/ML (ref 10–15)

## 2018-04-20 PROCEDURE — 96366 THER/PROPH/DIAG IV INF ADDON: CPT

## 2018-04-20 PROCEDURE — 80202 ASSAY OF VANCOMYCIN: CPT | Performed by: PODIATRIST

## 2018-04-20 PROCEDURE — 80048 BASIC METABOLIC PNL TOTAL CA: CPT | Performed by: PODIATRIST

## 2018-04-20 PROCEDURE — 25010000002 VANCOMYCIN PER 500 MG: Performed by: PODIATRIST

## 2018-04-20 PROCEDURE — 36415 COLL VENOUS BLD VENIPUNCTURE: CPT

## 2018-04-20 PROCEDURE — 96365 THER/PROPH/DIAG IV INF INIT: CPT

## 2018-04-20 RX ADMIN — VANCOMYCIN HYDROCHLORIDE 2000 MG: 5 INJECTION, POWDER, LYOPHILIZED, FOR SOLUTION INTRAVENOUS at 09:00

## 2018-04-20 RX ADMIN — VANCOMYCIN HYDROCHLORIDE 2000 MG: 1 INJECTION, POWDER, LYOPHILIZED, FOR SOLUTION INTRAVENOUS at 19:27

## 2018-04-21 ENCOUNTER — INFUSION (OUTPATIENT)
Dept: PEDIATRICS | Facility: HOSPITAL | Age: 58
End: 2018-04-21

## 2018-04-21 VITALS
HEART RATE: 82 BPM | TEMPERATURE: 98.2 F | SYSTOLIC BLOOD PRESSURE: 107 MMHG | OXYGEN SATURATION: 96 % | RESPIRATION RATE: 18 BRPM | DIASTOLIC BLOOD PRESSURE: 70 MMHG

## 2018-04-21 VITALS
DIASTOLIC BLOOD PRESSURE: 84 MMHG | RESPIRATION RATE: 18 BRPM | TEMPERATURE: 97.2 F | OXYGEN SATURATION: 96 % | HEART RATE: 93 BPM | SYSTOLIC BLOOD PRESSURE: 105 MMHG

## 2018-04-21 DIAGNOSIS — M86.372 CHRONIC MULTIFOCAL OSTEOMYELITIS OF LEFT FOOT (HCC): Primary | ICD-10-CM

## 2018-04-21 PROCEDURE — 25010000002 VANCOMYCIN PER 500 MG: Performed by: PODIATRIST

## 2018-04-21 PROCEDURE — 96365 THER/PROPH/DIAG IV INF INIT: CPT

## 2018-04-21 PROCEDURE — 96366 THER/PROPH/DIAG IV INF ADDON: CPT

## 2018-04-21 RX ADMIN — VANCOMYCIN HYDROCHLORIDE 2000 MG: 1 INJECTION, POWDER, LYOPHILIZED, FOR SOLUTION INTRAVENOUS at 18:55

## 2018-04-21 RX ADMIN — VANCOMYCIN HYDROCHLORIDE 2000 MG: 1 INJECTION, POWDER, LYOPHILIZED, FOR SOLUTION INTRAVENOUS at 08:13

## 2018-04-22 ENCOUNTER — INFUSION (OUTPATIENT)
Dept: PEDIATRICS | Facility: HOSPITAL | Age: 58
End: 2018-04-22

## 2018-04-22 VITALS
SYSTOLIC BLOOD PRESSURE: 116 MMHG | RESPIRATION RATE: 20 BRPM | DIASTOLIC BLOOD PRESSURE: 75 MMHG | TEMPERATURE: 98.4 F | HEART RATE: 86 BPM | OXYGEN SATURATION: 97 %

## 2018-04-22 VITALS
OXYGEN SATURATION: 97 % | DIASTOLIC BLOOD PRESSURE: 86 MMHG | SYSTOLIC BLOOD PRESSURE: 130 MMHG | TEMPERATURE: 97.8 F | RESPIRATION RATE: 16 BRPM

## 2018-04-22 DIAGNOSIS — M86.372 CHRONIC MULTIFOCAL OSTEOMYELITIS OF LEFT FOOT (HCC): Primary | ICD-10-CM

## 2018-04-22 PROCEDURE — 96366 THER/PROPH/DIAG IV INF ADDON: CPT

## 2018-04-22 PROCEDURE — 96365 THER/PROPH/DIAG IV INF INIT: CPT

## 2018-04-22 PROCEDURE — 25010000002 VANCOMYCIN PER 500 MG: Performed by: PODIATRIST

## 2018-04-22 RX ADMIN — VANCOMYCIN HYDROCHLORIDE 2000 MG: 5 INJECTION, POWDER, LYOPHILIZED, FOR SOLUTION INTRAVENOUS at 19:29

## 2018-04-22 RX ADMIN — VANCOMYCIN HYDROCHLORIDE 2000 MG: 1 INJECTION, POWDER, LYOPHILIZED, FOR SOLUTION INTRAVENOUS at 08:01

## 2018-04-23 ENCOUNTER — TELEPHONE (OUTPATIENT)
Dept: PODIATRY | Facility: CLINIC | Age: 58
End: 2018-04-23

## 2018-04-23 ENCOUNTER — INFUSION (OUTPATIENT)
Dept: PEDIATRICS | Facility: HOSPITAL | Age: 58
End: 2018-04-23

## 2018-04-23 VITALS
DIASTOLIC BLOOD PRESSURE: 84 MMHG | OXYGEN SATURATION: 97 % | TEMPERATURE: 98.7 F | RESPIRATION RATE: 20 BRPM | HEART RATE: 83 BPM | SYSTOLIC BLOOD PRESSURE: 129 MMHG

## 2018-04-23 VITALS
HEART RATE: 83 BPM | OXYGEN SATURATION: 96 % | DIASTOLIC BLOOD PRESSURE: 84 MMHG | SYSTOLIC BLOOD PRESSURE: 130 MMHG | RESPIRATION RATE: 20 BRPM | TEMPERATURE: 97.5 F

## 2018-04-23 DIAGNOSIS — M86.372 CHRONIC MULTIFOCAL OSTEOMYELITIS OF LEFT FOOT (HCC): Primary | ICD-10-CM

## 2018-04-23 PROCEDURE — 96366 THER/PROPH/DIAG IV INF ADDON: CPT

## 2018-04-23 PROCEDURE — 25010000002 VANCOMYCIN PER 500 MG: Performed by: PODIATRIST

## 2018-04-23 PROCEDURE — 96365 THER/PROPH/DIAG IV INF INIT: CPT

## 2018-04-23 RX ORDER — 0.9 % SODIUM CHLORIDE 0.9 %
VIAL (ML) INJECTION
Status: COMPLETED
Start: 2018-04-23 | End: 2018-04-23

## 2018-04-23 RX ADMIN — SODIUM CHLORIDE 10 ML: 9 INJECTION, SOLUTION INTRAMUSCULAR; INTRAVENOUS; SUBCUTANEOUS at 20:43

## 2018-04-23 RX ADMIN — VANCOMYCIN HYDROCHLORIDE 2000 MG: 5 INJECTION, POWDER, LYOPHILIZED, FOR SOLUTION INTRAVENOUS at 08:11

## 2018-04-23 RX ADMIN — VANCOMYCIN HYDROCHLORIDE 2000 MG: 5 INJECTION, POWDER, LYOPHILIZED, FOR SOLUTION INTRAVENOUS at 18:22

## 2018-04-23 NOTE — TELEPHONE ENCOUNTER
KEVIN FROM [facility] CALLED AND SAID THAT PATIENT IS SCHEDULED FOR A DOSE AT 8 AM AND 8 PM.      DOES HE NEED THE 8 PM DOSE?    PLEASE CALL AND ADVISE.    [phone]    THANKS.

## 2018-04-24 ENCOUNTER — INFUSION (OUTPATIENT)
Dept: PEDIATRICS | Facility: HOSPITAL | Age: 58
End: 2018-04-24

## 2018-04-24 VITALS
HEART RATE: 82 BPM | OXYGEN SATURATION: 95 % | RESPIRATION RATE: 20 BRPM | DIASTOLIC BLOOD PRESSURE: 79 MMHG | SYSTOLIC BLOOD PRESSURE: 111 MMHG | TEMPERATURE: 98 F

## 2018-04-24 VITALS
DIASTOLIC BLOOD PRESSURE: 75 MMHG | OXYGEN SATURATION: 98 % | RESPIRATION RATE: 18 BRPM | SYSTOLIC BLOOD PRESSURE: 118 MMHG | TEMPERATURE: 98.1 F | HEART RATE: 70 BPM

## 2018-04-24 DIAGNOSIS — M86.372 CHRONIC MULTIFOCAL OSTEOMYELITIS OF LEFT FOOT (HCC): Primary | ICD-10-CM

## 2018-04-24 PROCEDURE — 25010000002 VANCOMYCIN PER 500 MG: Performed by: PODIATRIST

## 2018-04-24 PROCEDURE — 96365 THER/PROPH/DIAG IV INF INIT: CPT

## 2018-04-24 PROCEDURE — 96366 THER/PROPH/DIAG IV INF ADDON: CPT

## 2018-04-24 RX ADMIN — VANCOMYCIN HYDROCHLORIDE 2000 MG: 1 INJECTION, POWDER, LYOPHILIZED, FOR SOLUTION INTRAVENOUS at 07:25

## 2018-04-24 RX ADMIN — VANCOMYCIN HYDROCHLORIDE 2000 MG: 1 INJECTION, POWDER, LYOPHILIZED, FOR SOLUTION INTRAVENOUS at 17:55

## 2018-05-01 ENCOUNTER — TELEPHONE (OUTPATIENT)
Dept: PODIATRY | Facility: CLINIC | Age: 58
End: 2018-05-01

## 2018-05-02 ENCOUNTER — OFFICE VISIT (OUTPATIENT)
Dept: PODIATRY | Facility: CLINIC | Age: 58
End: 2018-05-02

## 2018-05-02 VITALS — HEART RATE: 87 BPM | OXYGEN SATURATION: 98 % | WEIGHT: 280 LBS | HEIGHT: 76 IN | BODY MASS INDEX: 34.1 KG/M2

## 2018-05-02 DIAGNOSIS — M86.272 SUBACUTE OSTEOMYELITIS OF LEFT FOOT (HCC): Primary | ICD-10-CM

## 2018-05-02 DIAGNOSIS — T84.7XXD INFECTED HARDWARE IN LEFT LOWER EXTREMITY, SUBSEQUENT ENCOUNTER: ICD-10-CM

## 2018-05-02 PROCEDURE — 99024 POSTOP FOLLOW-UP VISIT: CPT | Performed by: PODIATRIST

## 2018-06-05 ENCOUNTER — OFFICE VISIT (OUTPATIENT)
Dept: PODIATRY | Facility: CLINIC | Age: 58
End: 2018-06-05

## 2018-06-05 VITALS — HEIGHT: 76 IN | BODY MASS INDEX: 34.09 KG/M2 | WEIGHT: 279.98 LBS

## 2018-06-05 DIAGNOSIS — Z89.431 S/P TRANSMETATARSAL AMPUTATION OF FOOT, RIGHT (HCC): ICD-10-CM

## 2018-06-05 DIAGNOSIS — M86.272 SUBACUTE OSTEOMYELITIS OF LEFT FOOT (HCC): Primary | ICD-10-CM

## 2018-06-05 DIAGNOSIS — T84.7XXD INFECTED HARDWARE IN LEFT LOWER EXTREMITY, SUBSEQUENT ENCOUNTER: ICD-10-CM

## 2018-06-05 PROCEDURE — 99024 POSTOP FOLLOW-UP VISIT: CPT | Performed by: PODIATRIST

## 2018-06-05 NOTE — PROGRESS NOTES
Emre Lisa  1960  58 y.o. male     Patient presents today for post op recheck of his left foot.     06/18/18  Chief Complaint   Patient presents with   • Left Foot - Post-op Follow-up           History of Present Illness    Patient presents to clinic today for follow-up.  He denies pain.  He is currently ambulating in regular shoe gear.     Past Medical History:   Diagnosis Date   • Arthritis    • Atrial fibrillation    • Diabetes mellitus    • Diabetic foot ulcer associated with type 2 diabetes mellitus     left great toe   • Hallux malleus of left foot    • Hammer toe    • Hypertension    • MI (myocardial infarction)    • Neuropathy in diabetes    • Onychomycosis          Past Surgical History:   Procedure Laterality Date   • AMPUTATION DIGIT Right 3/5/2017    Procedure: RIGHT AMPUTATION TRANSMETATRASAL , RECESSION GASTROCNEMOUS;  Surgeon: Marco A Thompson DPM;  Location: [facility];  Service:    • CARDIAC DEFIBRILLATOR PLACEMENT  2010, 2016   • CARPAL TUNNEL RELEASE  2015    elbow and wrist left arm   • FOOT IRRIGATION, DEBRIDEMENT AND REPAIR Left 3/7/2018    Procedure: FOOT IRRIGATION, DEBRIDEMENT AND REPAIR;  Surgeon: Marco A Thompson DPM;  Location: [facility] OR;  Service:    • FOOT IRRIGATION, DEBRIDEMENT AND REPAIR Left 3/10/2018    Procedure: FOOT IRRIGATION, DEBRIDEMENT AND REPAIR;  Surgeon: Marco A Thompson DPM;  Location: [facility] OR;  Service: Podiatry   • FOOT SURGERY     • FOOT WOUND CLOSURE Right 3/2/2017    Procedure: INCISION AND DRAINAGE, RIGHT FOOT;  Surgeon: Tung Rosenthal DPM;  Location: [facility] OR;  Service:    • HAMMER TOE REPAIR Left 2/15/2018    Procedure: HAMMERTOE CORRECTION LEFT SECOND, THIRD AND FOURTH TOES AND HALLUX INTERPHALANGEAL JOINT ARTHRODESIS LEFT FOOT (Micro Asnis, 4.0 & 5.0 Asnis)      (c-arm);  Surgeon: Marco A Thompson DPM;  Location: [facility] OR;  Service:    • HARDWARE REMOVAL Left 3/5/2018    Procedure: ANKLE/FOOT HARDWARE REMOVAL;  Surgeon: Marco A Thompson DPM;   Location: [facility];  Service:    • INCISION AND DRAINAGE LEG Left 3/5/2018    Procedure: INCISION AND DRAINAGE LOWER EXTREMITY;  Surgeon: Marco A Thompson DPM;  Location: [facility];  Service:    • TOE AMPUTATION Right 2016   • TONSILECTOMY, ADENOIDECTOMY, BILATERAL MYRINGOTOMY AND TUBES      age 25         Family History   Problem Relation Age of Onset   • Diabetes Father    • Stroke Father    • No Known Problems Maternal Grandmother    • No Known Problems Maternal Grandfather    • No Known Problems Paternal Grandmother    • No Known Problems Paternal Grandfather    • No Known Problems Son    • No Known Problems Daughter    • No Known Problems Daughter          Social History     Social History   • Marital status:      Spouse name: N/A   • Number of children: N/A   • Years of education: N/A     Occupational History   • Not on file.     Social History Main Topics   • Smoking status: Never Smoker   • Smokeless tobacco: Never Used   • Alcohol use No   • Drug use: No   • Sexual activity: Defer     Other Topics Concern   • Not on file     Social History Narrative   • No narrative on file         Current Outpatient Prescriptions   Medication Sig Dispense Refill   • aspirin 81 MG chewable tablet Chew 81 mg Every Night.     • Bacitracin Zinc (MAGIC BUTT OINTMENT) Apply 1 each topically 2 (Two) Times a Day. 120 g 1   • Cholecalciferol (VITAMIN D3) 5000 UNITS capsule capsule Take 5,000 Units by mouth Every Night.     • Cyanocobalamin (VITAMIN B 12 PO) Take 1,000 mcg by mouth Every Night.     • doxycycline (VIBRAMYCIN) 100 MG capsule Take 1 capsule by mouth 2 (Two) Times a Day. 28 capsule 0   • dronedarone (MULTAQ) 400 MG tablet Take 400 mg by mouth Every Night.     • fluticasone (FLONASE) 50 MCG/ACT nasal spray 2 sprays into each nostril As Needed for Rhinitis.     • glimepiride (AMARYL) 2 MG tablet Take 4 mg by mouth 2 (Two) Times a Day.     • HYDROcodone-acetaminophen (NORCO) 7.5-325 MG per tablet Take 1 tablet by  "mouth Every 6 (Six) Hours As Needed for Moderate Pain . 30 tablet 0   • magnesium oxide (MAGOX) 400 (241.3 Mg) MG tablet tablet Take 400 mg by mouth 2 (Two) Times a Day.     • metFORMIN (GLUCOPHAGE) 1000 MG tablet Take 1,000 mg by mouth 2 (Two) Times a Day With Meals. Patient states he only uses when needed     • metoprolol tartrate (LOPRESSOR) 25 MG tablet Take 12.5 mg by mouth Every Night.     • ONE TOUCH ULTRA TEST test strip USE TO TEST BLOOD SUGAR THREE TIMES A DAY  1   • valsartan (DIOVAN) 160 MG tablet Take 40 mg by mouth Every Night. 1/2 tablet daily      • vitamin C (ASCORBIC ACID) 500 MG tablet Take 500 mg by mouth Every Night.     • vitamin E 200 UNIT capsule Take 200 Units by mouth Every Night.       No current facility-administered medications for this visit.            OBJECTIVE    Ht 193 cm (76\")   Wt 127 kg (279 lb 15.8 oz)   BMI 34.08 kg/m²     Review of Systems   Constitutional: Negative for chills and fever.   Cardiovascular: Negative for chest pain.   Gastrointestinal: Negative for constipation, diarrhea, nausea and vomiting.         Constitutional: well developed, well nourished    HEENT: Normocephalic and atraumatic, normal hearing    Respiratory: Non labored respirations noted    LLE: all incision sites are healed.  No drainage noted.  No surrounding erythema noted.  Hallux is slightly abducted.  Remaining digits are rectus.  Negative homans.       Psychiatric: A&O x 3 with normal mood and affect. NAD.       Procedures        ASSESSMENT AND PLAN    Emre was seen today for post-op follow-up.    Diagnoses and all orders for this visit:    Subacute osteomyelitis of left foot    Infected hardware in left lower extremity, subsequent encounter    S/P transmetatarsal amputation of foot, right      - Pt is doing very well  - pt given a rx for custom shoes with right foot toe filler.   - continue  regular shoe gear  - RTC in 9 weeks          This document has been electronically signed by Marco A FISCHER " CHRISTIAN Thompson on June 18, 2018 12:34 PM

## 2018-07-03 ENCOUNTER — TELEPHONE (OUTPATIENT)
Dept: PODIATRY | Facility: CLINIC | Age: 58
End: 2018-07-03

## 2018-07-03 NOTE — TELEPHONE ENCOUNTER
MYLES LOCKE FROM [facility] IS LOOKING TO SEE IF AMENDED NOTES ARE DONE FOR PATIENT.    [phone]

## 2018-07-05 ENCOUNTER — OFFICE VISIT (OUTPATIENT)
Dept: PODIATRY | Facility: CLINIC | Age: 58
End: 2018-07-05

## 2018-07-05 VITALS — WEIGHT: 279.99 LBS | HEIGHT: 76 IN | BODY MASS INDEX: 34.09 KG/M2

## 2018-07-05 DIAGNOSIS — M14.672 CHARCOT'S JOINT OF LEFT FOOT: ICD-10-CM

## 2018-07-05 DIAGNOSIS — L97.521 SKIN ULCER OF LEFT FOOT, LIMITED TO BREAKDOWN OF SKIN (HCC): Primary | ICD-10-CM

## 2018-07-05 PROCEDURE — 99213 OFFICE O/P EST LOW 20 MIN: CPT | Performed by: PODIATRIST

## 2018-07-05 NOTE — PROGRESS NOTES
Emre Lisa  1960  58 y.o. male         07/05/18  Chief Complaint   Patient presents with   • Left Foot - Wound Check           History of Present Illness    Dr. Thompson patient with prior history of several foot ulcerations presents today with a new wound on the bottom of his left foot which she first noticed Tuesday night.  Dr. hTompson had been working on getting him new custom shoes which should be ready any time.    Past Medical History:   Diagnosis Date   • Arthritis    • Atrial fibrillation (CMS/HCC)    • Diabetes mellitus (CMS/HCC)    • Diabetic foot ulcer associated with type 2 diabetes mellitus (CMS/HCC)     left great toe   • Hallux malleus of left foot    • Hammer toe    • Hypertension    • MI (myocardial infarction)    • Neuropathy in diabetes (CMS/HCC)    • Onychomycosis          Past Surgical History:   Procedure Laterality Date   • AMPUTATION DIGIT Right 3/5/2017    Procedure: RIGHT AMPUTATION TRANSMETATRASAL , RECESSION GASTROCNEMOUS;  Surgeon: Marco A Thompson DPM;  Location: [facility];  Service:    • CARDIAC DEFIBRILLATOR PLACEMENT  2010, 2016   • CARPAL TUNNEL RELEASE  2015    elbow and wrist left arm   • FOOT IRRIGATION, DEBRIDEMENT AND REPAIR Left 3/7/2018    Procedure: FOOT IRRIGATION, DEBRIDEMENT AND REPAIR;  Surgeon: Marco A Thompson DPM;  Location: [facility];  Service:    • FOOT IRRIGATION, DEBRIDEMENT AND REPAIR Left 3/10/2018    Procedure: FOOT IRRIGATION, DEBRIDEMENT AND REPAIR;  Surgeon: Marco A Thompson DPM;  Location: [facility];  Service: Podiatry   • FOOT SURGERY     • FOOT WOUND CLOSURE Right 3/2/2017    Procedure: INCISION AND DRAINAGE, RIGHT FOOT;  Surgeon: Tung Rosenthal DPM;  Location: [facility];  Service:    • HAMMER TOE REPAIR Left 2/15/2018    Procedure: HAMMERTOE CORRECTION LEFT SECOND, THIRD AND FOURTH TOES AND HALLUX INTERPHALANGEAL JOINT ARTHRODESIS LEFT FOOT (Micro Asnis, 4.0 & 5.0 Asnis)      (c-arm);  Surgeon: Marco A Thompson DPM;  Location: [facility] OR;   Service:    • HARDWARE REMOVAL Left 3/5/2018    Procedure: ANKLE/FOOT HARDWARE REMOVAL;  Surgeon: Marco A Thompson DPM;  Location: [facility] OR;  Service:    • INCISION AND DRAINAGE LEG Left 3/5/2018    Procedure: INCISION AND DRAINAGE LOWER EXTREMITY;  Surgeon: Marco A Thompson DPM;  Location: [facility];  Service:    • TOE AMPUTATION Right 2016   • TONSILECTOMY, ADENOIDECTOMY, BILATERAL MYRINGOTOMY AND TUBES      age 25         Family History   Problem Relation Age of Onset   • Diabetes Father    • Stroke Father    • No Known Problems Maternal Grandmother    • No Known Problems Maternal Grandfather    • No Known Problems Paternal Grandmother    • No Known Problems Paternal Grandfather    • No Known Problems Son    • No Known Problems Daughter    • No Known Problems Daughter          Social History     Social History   • Marital status:      Spouse name: N/A   • Number of children: N/A   • Years of education: N/A     Occupational History   • Not on file.     Social History Main Topics   • Smoking status: Never Smoker   • Smokeless tobacco: Never Used   • Alcohol use No   • Drug use: No   • Sexual activity: Defer     Other Topics Concern   • Not on file     Social History Narrative   • No narrative on file         Current Outpatient Prescriptions   Medication Sig Dispense Refill   • aspirin 81 MG chewable tablet Chew 81 mg Every Night.     • Bacitracin Zinc (MAGIC BUTT OINTMENT) Apply 1 each topically 2 (Two) Times a Day. 120 g 1   • Cholecalciferol (VITAMIN D3) 5000 UNITS capsule capsule Take 5,000 Units by mouth Every Night.     • Cyanocobalamin (VITAMIN B 12 PO) Take 1,000 mcg by mouth Every Night.     • doxycycline (VIBRAMYCIN) 100 MG capsule Take 1 capsule by mouth 2 (Two) Times a Day. 28 capsule 0   • dronedarone (MULTAQ) 400 MG tablet Take 400 mg by mouth Every Night.     • fluticasone (FLONASE) 50 MCG/ACT nasal spray 2 sprays into each nostril As Needed for Rhinitis.     • glimepiride (AMARYL) 2 MG tablet  "Take 4 mg by mouth 2 (Two) Times a Day.     • HYDROcodone-acetaminophen (NORCO) 7.5-325 MG per tablet Take 1 tablet by mouth Every 6 (Six) Hours As Needed for Moderate Pain . 30 tablet 0   • magnesium oxide (MAGOX) 400 (241.3 Mg) MG tablet tablet Take 400 mg by mouth 2 (Two) Times a Day.     • metFORMIN (GLUCOPHAGE) 1000 MG tablet Take 1,000 mg by mouth 2 (Two) Times a Day With Meals. Patient states he only uses when needed     • metoprolol tartrate (LOPRESSOR) 25 MG tablet Take 12.5 mg by mouth Every Night.     • ONE TOUCH ULTRA TEST test strip USE TO TEST BLOOD SUGAR THREE TIMES A DAY  1   • valsartan (DIOVAN) 160 MG tablet Take 40 mg by mouth Every Night. 1/2 tablet daily      • vitamin C (ASCORBIC ACID) 500 MG tablet Take 500 mg by mouth Every Night.     • vitamin E 200 UNIT capsule Take 200 Units by mouth Every Night.       No current facility-administered medications for this visit.            OBJECTIVE    Ht 193 cm (76\")   Wt 127 kg (279 lb 15.8 oz)   BMI 34.08 kg/m²     Constitutional:  Denies recent weight loss, weight gain, fever or chills, no change in exercise tolerance  Musculoskeletal: No pain  Skin: Left foot ulcer  Neurological: Numbness  Psychiatric/Behavioral: Denies depression    Physical exam     Constitutional: he appears well-developed and well-nourished.   HEENT: Normocephalic. Atraumatic  CV: No tenderness. RRR  Resp: Non-labored respiration. No wheezes.   Psychiatric: he has a normal mood and affect. his   behavior is normal.      Lower Extremity Exam:  Vascular: DP/PT pulses palpable 1+.   Positive hair growth.   trace perimalleolar edema  Neuro: Protective sensation diminished to lesser toes, b/l.  DTRs intact  Integument: Small 0.4 x 0.3 cm partial thickness ulceration overlying plantar lateral prominence on left foot.  No drainage, no erythema, no signs of infection.  Granular base.  Skin quality normal  No masses  Webspaces c/d/i  Musculoskeletal: LE muscle strength 5/5.   Gait " Normal  Mild hammertoe deformity toes 2 through 5 on left.  Right TMA      Procedures        ASSESSMENT AND PLAN    Emre was seen today for wound check.    Diagnoses and all orders for this visit:    Skin ulcer of left foot, limited to breakdown of skin (CMS/HCC)    Charcot's joint of left foot      -Patient with new onset of partial thickness ulceration to plantar left foot  -Continue home wound care with medihoney  -Place into offloading surgical shoe with cutout to local prominence  -Recheck next week with Dr. Thompson            This document has been electronically signed by Tung Rosenthal DPM on July 5, 2018 12:37 PM

## 2018-07-09 ENCOUNTER — OFFICE VISIT (OUTPATIENT)
Dept: PODIATRY | Facility: CLINIC | Age: 58
End: 2018-07-09

## 2018-07-09 VITALS — OXYGEN SATURATION: 98 % | HEART RATE: 89 BPM | HEIGHT: 76 IN | WEIGHT: 279 LBS | BODY MASS INDEX: 33.97 KG/M2

## 2018-07-09 DIAGNOSIS — E11.42 TYPE 2 DIABETES MELLITUS WITH PERIPHERAL NEUROPATHY (HCC): ICD-10-CM

## 2018-07-09 DIAGNOSIS — L97.521 SKIN ULCER OF LEFT FOOT, LIMITED TO BREAKDOWN OF SKIN (HCC): Primary | ICD-10-CM

## 2018-07-09 DIAGNOSIS — M14.672 CHARCOT'S JOINT OF LEFT FOOT: ICD-10-CM

## 2018-07-09 DIAGNOSIS — Z89.431 S/P TRANSMETATARSAL AMPUTATION OF FOOT, RIGHT (HCC): ICD-10-CM

## 2018-07-09 PROCEDURE — 99213 OFFICE O/P EST LOW 20 MIN: CPT | Performed by: PODIATRIST

## 2018-07-09 NOTE — PROGRESS NOTES
Emre Lisa  1960  58 y.o. male     Patient presents today with a complaint of a new ulceration on his left foot. He was seen by Dr Rosenthal 7/5/2018.     07/09/18  Chief Complaint   Patient presents with   • Left Foot - Foot Ulcer       History of Present Illness    Patient presents to clinic today for follow-up. Has a new ulcer to the bottom of his left foot.  He was seen by Dr. Rosenthal last week and placed into an offloading surgical shoe. He has dressing the ulcer daily.  He has still not received his custom diabetic shoes.      Past Medical History:   Diagnosis Date   • Arthritis    • Atrial fibrillation (CMS/HCC)    • Diabetes mellitus (CMS/HCC)    • Diabetic foot ulcer associated with type 2 diabetes mellitus (CMS/HCC)     left great toe   • Hallux malleus of left foot    • Hammer toe    • Hypertension    • MI (myocardial infarction)    • Neuropathy in diabetes (CMS/[facility])    • Onychomycosis          Past Surgical History:   Procedure Laterality Date   • AMPUTATION DIGIT Right 3/5/2017    Procedure: RIGHT AMPUTATION TRANSMETATRASAL , RECESSION GASTROCNEMOUS;  Surgeon: Marco A Thompson DPM;  Location: [facility];  Service:    • CARDIAC DEFIBRILLATOR PLACEMENT  2010, 2016   • CARPAL TUNNEL RELEASE  2015    elbow and wrist left arm   • FOOT IRRIGATION, DEBRIDEMENT AND REPAIR Left 3/7/2018    Procedure: FOOT IRRIGATION, DEBRIDEMENT AND REPAIR;  Surgeon: Marco A Thompson DPM;  Location: [facility];  Service:    • FOOT IRRIGATION, DEBRIDEMENT AND REPAIR Left 3/10/2018    Procedure: FOOT IRRIGATION, DEBRIDEMENT AND REPAIR;  Surgeon: Marco A Thompson DPM;  Location: [facility] OR;  Service: Podiatry   • FOOT SURGERY     • FOOT WOUND CLOSURE Right 3/2/2017    Procedure: INCISION AND DRAINAGE, RIGHT FOOT;  Surgeon: Tung Rosenthal DPM;  Location: [facility];  Service:    • HAMMER TOE REPAIR Left 2/15/2018    Procedure: HAMMERTOE CORRECTION LEFT SECOND, THIRD AND FOURTH TOES AND HALLUX INTERPHALANGEAL JOINT  ARTHRODESIS LEFT FOOT (Micro Asnis, 4.0 & 5.0 Asnis)      (c-arm);  Surgeon: Marco A Thompson DPM;  Location: [facility];  Service:    • HARDWARE REMOVAL Left 3/5/2018    Procedure: ANKLE/FOOT HARDWARE REMOVAL;  Surgeon: Marco A Thompson DPM;  Location: [facility] OR;  Service:    • INCISION AND DRAINAGE LEG Left 3/5/2018    Procedure: INCISION AND DRAINAGE LOWER EXTREMITY;  Surgeon: Marco A Thompson DPM;  Location: [facility] OR;  Service:    • TOE AMPUTATION Right 2016   • TONSILECTOMY, ADENOIDECTOMY, BILATERAL MYRINGOTOMY AND TUBES      age 25         Family History   Problem Relation Age of Onset   • Diabetes Father    • Stroke Father    • No Known Problems Maternal Grandmother    • No Known Problems Maternal Grandfather    • No Known Problems Paternal Grandmother    • No Known Problems Paternal Grandfather    • No Known Problems Son    • No Known Problems Daughter    • No Known Problems Daughter          Social History     Social History   • Marital status:      Spouse name: N/A   • Number of children: N/A   • Years of education: N/A     Occupational History   • Not on file.     Social History Main Topics   • Smoking status: Never Smoker   • Smokeless tobacco: Never Used   • Alcohol use No   • Drug use: No   • Sexual activity: Defer     Other Topics Concern   • Not on file     Social History Narrative   • No narrative on file         Current Outpatient Prescriptions   Medication Sig Dispense Refill   • aspirin 81 MG chewable tablet Chew 81 mg Every Night.     • Bacitracin Zinc (MAGIC BUTT OINTMENT) Apply 1 each topically 2 (Two) Times a Day. 120 g 1   • Cholecalciferol (VITAMIN D3) 5000 UNITS capsule capsule Take 5,000 Units by mouth Every Night.     • Cyanocobalamin (VITAMIN B 12 PO) Take 1,000 mcg by mouth Every Night.     • doxycycline (VIBRAMYCIN) 100 MG capsule Take 1 capsule by mouth 2 (Two) Times a Day. 28 capsule 0   • dronedarone (MULTAQ) 400 MG tablet Take 400 mg by mouth Every Night.     • fluticasone  "(FLONASE) 50 MCG/ACT nasal spray 2 sprays into each nostril As Needed for Rhinitis.     • glimepiride (AMARYL) 2 MG tablet Take 4 mg by mouth 2 (Two) Times a Day.     • HYDROcodone-acetaminophen (NORCO) 7.5-325 MG per tablet Take 1 tablet by mouth Every 6 (Six) Hours As Needed for Moderate Pain . 30 tablet 0   • magnesium oxide (MAGOX) 400 (241.3 Mg) MG tablet tablet Take 400 mg by mouth 2 (Two) Times a Day.     • metFORMIN (GLUCOPHAGE) 1000 MG tablet Take 1,000 mg by mouth 2 (Two) Times a Day With Meals. Patient states he only uses when needed     • metoprolol tartrate (LOPRESSOR) 25 MG tablet Take 12.5 mg by mouth Every Night.     • ONE TOUCH ULTRA TEST test strip USE TO TEST BLOOD SUGAR THREE TIMES A DAY  1   • valsartan (DIOVAN) 160 MG tablet Take 40 mg by mouth Every Night. 1/2 tablet daily      • vitamin C (ASCORBIC ACID) 500 MG tablet Take 500 mg by mouth Every Night.     • vitamin E 200 UNIT capsule Take 200 Units by mouth Every Night.       No current facility-administered medications for this visit.            OBJECTIVE    Pulse 89   Ht 193 cm (76\")   Wt 127 kg (279 lb)   SpO2 98%   BMI 33.96 kg/m²     Review of Systems   Constitutional: Negative.    Respiratory: Negative.    Cardiovascular: Negative.    Musculoskeletal: Negative.    Skin: Positive for wound.   Psychiatric/Behavioral: Negative.            Constitutional: well developed, well nourished     HEENT: Normocephalic and atraumatic, normal hearing     Respiratory: Non labored respirations noted    Cardiovascular:    CFT brisk  to all digits  Skin temp is warm to warm from proximal tibia to distal digits  Pedal hair growth diminished.   No erythema or edema noted      Musculoskeletal:  TMA noted Right foot  Left hallux slightly abducted  Digits 2 through 5 left are rectus     Dermatological:   Skin is warm, dry and intact    Webspaces 1-4 bilateral are clean, dry and intact.   No subcutaneous nodules or masses noted    Full-thickness ulceration " noted to the plantar left midfoot.  Ulcer measures 0.5 x 0.5 x 0.1 cm.  No purulent drainage or foul odor noted.  It does not probe to bone.     Neurological:   Protective sensation absent  Sensation intact to light touch    DTR intact     Psychiatric: A&O x 3 with normal mood and affect. NAD.       Procedures        ASSESSMENT AND PLAN    Emre was seen today for foot ulcer.    Diagnoses and all orders for this visit:    Skin ulcer of left foot, limited to breakdown of skin (CMS/[facility])    Charcot's joint of left foot    S/P transmetatarsal amputation of foot, right (CMS/[facility])    Type 2 diabetes mellitus with peripheral neuropathy (CMS/[facility])      - Continue daily dressing changes and weightbearing in offloading surgical shoe  -  New orders provided for custom diabetic shoes.  Patient has transmetatarsal amputation on the right and requires forefoot filler.  This will help prevent patient's foot from sliding back and forth in his shoe thus decreasing risk for further wounds and help patient ambulate more effectively.  Patient also requires offloading modification to insert on the left foot due to patient's Charcot with prominent area in the middle of his foot which has now turned into an ulcer.  - RTC 1 week          This document has been electronically signed by Marco A Thompson DPM on July 9, 2018 12:57 PM

## 2018-07-17 ENCOUNTER — OFFICE VISIT (OUTPATIENT)
Dept: PODIATRY | Facility: CLINIC | Age: 58
End: 2018-07-17

## 2018-07-17 VITALS — OXYGEN SATURATION: 98 % | HEIGHT: 76 IN | BODY MASS INDEX: 33.97 KG/M2 | HEART RATE: 98 BPM | WEIGHT: 279 LBS

## 2018-07-17 DIAGNOSIS — M14.672 CHARCOT'S JOINT OF LEFT FOOT: ICD-10-CM

## 2018-07-17 DIAGNOSIS — L97.521 SKIN ULCER OF LEFT FOOT, LIMITED TO BREAKDOWN OF SKIN (HCC): Primary | ICD-10-CM

## 2018-07-17 PROCEDURE — 29445 APPL RIGID TOT CNTC LEG CAST: CPT | Performed by: PODIATRIST

## 2018-07-17 NOTE — PROGRESS NOTES
Emre Lisa  1960  58 y.o. male     Patient presents today for a recheck of his left foot ulcer.    07/17/18  Chief Complaint   Patient presents with   • Left Foot - Follow-up, Foot Ulcer       History of Present Illness    Patient presents to clinic for follow-up of his left foot ulcer.  He has been dressing it daily as instructed.  He is weightbearing as tolerated in a postsurgical shoe.  He states that he has received his new custom shoes and inserts.  He denies any other pedal complaints.    Past Medical History:   Diagnosis Date   • Arthritis    • Atrial fibrillation (CMS/HCC)    • Diabetes mellitus (CMS/HCC)    • Diabetic foot ulcer associated with type 2 diabetes mellitus (CMS/HCC)     left great toe   • Hallux malleus of left foot    • Hammer toe    • Hypertension    • MI (myocardial infarction)    • Neuropathy in diabetes (CMS/HCC)    • Onychomycosis          Past Surgical History:   Procedure Laterality Date   • AMPUTATION DIGIT Right 3/5/2017    Procedure: RIGHT AMPUTATION TRANSMETATRASAL , RECESSION GASTROCNEMOUS;  Surgeon: Marco A Thompson DPM;  Location: [facility];  Service:    • CARDIAC DEFIBRILLATOR PLACEMENT  2010, 2016   • CARPAL TUNNEL RELEASE  2015    elbow and wrist left arm   • FOOT IRRIGATION, DEBRIDEMENT AND REPAIR Left 3/7/2018    Procedure: FOOT IRRIGATION, DEBRIDEMENT AND REPAIR;  Surgeon: Marco A Thompson DPM;  Location: [facility];  Service:    • FOOT IRRIGATION, DEBRIDEMENT AND REPAIR Left 3/10/2018    Procedure: FOOT IRRIGATION, DEBRIDEMENT AND REPAIR;  Surgeon: Marco A Thompson DPM;  Location: [facility];  Service: Podiatry   • FOOT SURGERY     • FOOT WOUND CLOSURE Right 3/2/2017    Procedure: INCISION AND DRAINAGE, RIGHT FOOT;  Surgeon: Tung Rosenthal DPM;  Location: [facility];  Service:    • HAMMER TOE REPAIR Left 2/15/2018    Procedure: HAMMERTOE CORRECTION LEFT SECOND, THIRD AND FOURTH TOES AND HALLUX INTERPHALANGEAL JOINT ARTHRODESIS LEFT FOOT (Micro Asnis, 4.0 & 5.0  Nadeen)      (c-arm);  Surgeon: Marco A Thompson DPM;  Location: [facility] OR;  Service:    • HARDWARE REMOVAL Left 3/5/2018    Procedure: ANKLE/FOOT HARDWARE REMOVAL;  Surgeon: Marco A Thompson DPM;  Location: [facility] OR;  Service:    • INCISION AND DRAINAGE LEG Left 3/5/2018    Procedure: INCISION AND DRAINAGE LOWER EXTREMITY;  Surgeon: Marco A Thompson DPM;  Location: [facility] OR;  Service:    • TOE AMPUTATION Right 2016   • TONSILECTOMY, ADENOIDECTOMY, BILATERAL MYRINGOTOMY AND TUBES      age 25         Family History   Problem Relation Age of Onset   • Diabetes Father    • Stroke Father    • No Known Problems Maternal Grandmother    • No Known Problems Maternal Grandfather    • No Known Problems Paternal Grandmother    • No Known Problems Paternal Grandfather    • No Known Problems Son    • No Known Problems Daughter    • No Known Problems Daughter          Social History     Social History   • Marital status:      Spouse name: N/A   • Number of children: N/A   • Years of education: N/A     Occupational History   • Not on file.     Social History Main Topics   • Smoking status: Never Smoker   • Smokeless tobacco: Never Used   • Alcohol use No   • Drug use: No   • Sexual activity: Defer     Other Topics Concern   • Not on file     Social History Narrative   • No narrative on file         Current Outpatient Prescriptions   Medication Sig Dispense Refill   • aspirin 81 MG chewable tablet Chew 81 mg Every Night.     • Bacitracin Zinc (MAGIC BUTT OINTMENT) Apply 1 each topically 2 (Two) Times a Day. 120 g 1   • Cholecalciferol (VITAMIN D3) 5000 UNITS capsule capsule Take 5,000 Units by mouth Every Night.     • Cyanocobalamin (VITAMIN B 12 PO) Take 1,000 mcg by mouth Every Night.     • doxycycline (VIBRAMYCIN) 100 MG capsule Take 1 capsule by mouth 2 (Two) Times a Day. 28 capsule 0   • dronedarone (MULTAQ) 400 MG tablet Take 400 mg by mouth Every Night.     • fluticasone (FLONASE) 50 MCG/ACT nasal spray 2 sprays into  "each nostril As Needed for Rhinitis.     • glimepiride (AMARYL) 2 MG tablet Take 4 mg by mouth 2 (Two) Times a Day.     • HYDROcodone-acetaminophen (NORCO) 7.5-325 MG per tablet Take 1 tablet by mouth Every 6 (Six) Hours As Needed for Moderate Pain . 30 tablet 0   • magnesium oxide (MAGOX) 400 (241.3 Mg) MG tablet tablet Take 400 mg by mouth 2 (Two) Times a Day.     • metFORMIN (GLUCOPHAGE) 1000 MG tablet Take 1,000 mg by mouth 2 (Two) Times a Day With Meals. Patient states he only uses when needed     • metoprolol tartrate (LOPRESSOR) 25 MG tablet Take 12.5 mg by mouth Every Night.     • ONE TOUCH ULTRA TEST test strip USE TO TEST BLOOD SUGAR THREE TIMES A DAY  1   • valsartan (DIOVAN) 160 MG tablet Take 40 mg by mouth Every Night. 1/2 tablet daily      • vitamin C (ASCORBIC ACID) 500 MG tablet Take 500 mg by mouth Every Night.     • vitamin E 200 UNIT capsule Take 200 Units by mouth Every Night.       No current facility-administered medications for this visit.            OBJECTIVE    Pulse 98   Ht 193 cm (76\")   Wt 127 kg (279 lb)   SpO2 98%   BMI 33.96 kg/m²     Review of Systems   Constitutional: Negative.    HENT: Negative.    Eyes: Negative.    Respiratory: Negative.    Cardiovascular: Negative.    Gastrointestinal: Negative.    Endocrine: Negative.    Genitourinary: Negative.    Musculoskeletal: Negative.    Skin: Positive for wound.   Neurological: Negative.    Psychiatric/Behavioral: Negative.            Constitutional: well developed, well nourished     HEENT: Normocephalic and atraumatic, normal hearing     Respiratory: Non labored respirations noted    Cardiovascular:    CFT brisk  to all digits  Skin temp is warm to warm from proximal tibia to distal digits  Pedal hair growth diminished.   No erythema or edema noted      Musculoskeletal:  TMA noted Right foot  Left hallux slightly abducted  Digits 2 through 5 left are rectus     Dermatological:   Skin is warm, dry and intact    Webspaces 1-4 " bilateral are clean, dry and intact.   No subcutaneous nodules or masses noted    Full-thickness ulceration noted to the plantar left midfoot.  Ulcer measures 1.0 x 1.0 x 0.2 cm.  No purulent drainage or foul odor noted.  It does not probe to bone.  Maceration noted surrounding ulcer.     Neurological:   Protective sensation absent  Sensation intact to light touch    DTR intact     Psychiatric: A&O x 3 with normal mood and affect. NAD.       Procedures        ASSESSMENT AND PLAN    Emre was seen today for follow-up and foot ulcer.    Diagnoses and all orders for this visit:    Skin ulcer of left foot, limited to breakdown of skin (CMS/HCC)  -     XR Foot 3+ View Left    Charcot's joint of left foot      - Ulcer noted in objective was debrided with a #15 blade and tissue nippers.  Post-debridement the ulcer measures 3.0 x 2.2 x 0.2 cm.  Ulcer was dressed  - Apply total contact cast  - All his questions were answered  - RTC 1 week          This document has been electronically signed by Marco A Thompson DPM on July 17, 2018 4:37 PM

## 2018-07-24 ENCOUNTER — OFFICE VISIT (OUTPATIENT)
Dept: PODIATRY | Facility: CLINIC | Age: 58
End: 2018-07-24

## 2018-07-24 VITALS — BODY MASS INDEX: 33.85 KG/M2 | HEIGHT: 76 IN | WEIGHT: 278 LBS

## 2018-07-24 DIAGNOSIS — L97.521 SKIN ULCER OF LEFT FOOT, LIMITED TO BREAKDOWN OF SKIN (HCC): ICD-10-CM

## 2018-07-24 DIAGNOSIS — M14.672 CHARCOT'S JOINT OF LEFT FOOT: Primary | ICD-10-CM

## 2018-07-24 PROCEDURE — 29445 APPL RIGID TOT CNTC LEG CAST: CPT | Performed by: PODIATRIST

## 2018-07-24 RX ORDER — FLUTICASONE PROPIONATE 50 MCG
1-2 SPRAY, SUSPENSION (ML) NASAL
COMMUNITY
Start: 2018-06-01 | End: 2018-08-31

## 2018-07-24 RX ORDER — GLIMEPIRIDE 2 MG/1
TABLET ORAL
COMMUNITY
Start: 2018-07-17 | End: 2019-11-11

## 2018-07-24 NOTE — PROGRESS NOTES
Emre Lisa  1960  58 y.o. male   PCP: Jamaal Bravo MD    Patient presents today for a recheck of his left foot ulcer.    07/24/18  Chief Complaint   Patient presents with   • Left Foot - Foot Ulcer       History of Present Illness    Patient presents to clinic for follow-up of his left foot ulcer. He is currently ambulating in a total contact cast.  She denies any new pedal complaints.    Past Medical History:   Diagnosis Date   • Arthritis    • Atrial fibrillation (CMS/HCC)    • Diabetes mellitus (CMS/HCC)    • Diabetic foot ulcer associated with type 2 diabetes mellitus (CMS/HCC)     left great toe   • Hallux malleus of left foot    • Hammer toe    • Hypertension    • MI (myocardial infarction)    • Neuropathy in diabetes (CMS/HCC)    • Onychomycosis          Past Surgical History:   Procedure Laterality Date   • AMPUTATION DIGIT Right 3/5/2017    Procedure: RIGHT AMPUTATION TRANSMETATRASAL , RECESSION GASTROCNEMOUS;  Surgeon: Marco A Thompson DPM;  Location: [facility];  Service:    • CARDIAC DEFIBRILLATOR PLACEMENT  2010, 2016   • CARPAL TUNNEL RELEASE  2015    elbow and wrist left arm   • FOOT IRRIGATION, DEBRIDEMENT AND REPAIR Left 3/7/2018    Procedure: FOOT IRRIGATION, DEBRIDEMENT AND REPAIR;  Surgeon: Marco A Thompson DPM;  Location: [facility] OR;  Service:    • FOOT IRRIGATION, DEBRIDEMENT AND REPAIR Left 3/10/2018    Procedure: FOOT IRRIGATION, DEBRIDEMENT AND REPAIR;  Surgeon: Marco A Thompson DPM;  Location: [facility];  Service: Podiatry   • FOOT SURGERY     • FOOT WOUND CLOSURE Right 3/2/2017    Procedure: INCISION AND DRAINAGE, RIGHT FOOT;  Surgeon: Tung Rosenthal DPM;  Location: [facility] OR;  Service:    • HAMMER TOE REPAIR Left 2/15/2018    Procedure: HAMMERTOE CORRECTION LEFT SECOND, THIRD AND FOURTH TOES AND HALLUX INTERPHALANGEAL JOINT ARTHRODESIS LEFT FOOT (Micro Asnis, 4.0 & 5.0 Asnis)      (c-arm);  Surgeon: Marco A Thompson DPM;  Location: [facility] OR;  Service:    • HARDWARE  REMOVAL Left 3/5/2018    Procedure: ANKLE/FOOT HARDWARE REMOVAL;  Surgeon: Marco A Thompson DPM;  Location: [facility] OR;  Service:    • INCISION AND DRAINAGE LEG Left 3/5/2018    Procedure: INCISION AND DRAINAGE LOWER EXTREMITY;  Surgeon: Marco A Thompson DPM;  Location: [facility] [place];  Service:    • TOE AMPUTATION Right 2016   • TONSILECTOMY, ADENOIDECTOMY, BILATERAL MYRINGOTOMY AND TUBES      age 25         Family History   Problem Relation Age of Onset   • Diabetes Father    • Stroke Father    • No Known Problems Maternal Grandmother    • No Known Problems Maternal Grandfather    • No Known Problems Paternal Grandmother    • No Known Problems Paternal Grandfather    • No Known Problems Son    • No Known Problems Daughter    • No Known Problems Daughter          Social History     Social History   • Marital status:      Spouse name: N/A   • Number of children: N/A   • Years of education: N/A     Occupational History   • Not on file.     Social History Main Topics   • Smoking status: Never Smoker   • Smokeless tobacco: Never Used   • Alcohol use No   • Drug use: No   • Sexual activity: Defer     Other Topics Concern   • Not on file     Social History Narrative   • No narrative on file         Current Outpatient Prescriptions   Medication Sig Dispense Refill   • fluticasone (FLONASE) 50 MCG/ACT nasal spray 1-2 sprays into the nostril(s) as directed by provider.     • glimepiride (AMARYL) 2 MG tablet TAKE 1 TABLET BY MOUTH 2 (TWO) TIMES DAILY WITH FOOD     • metFORMIN (GLUCOPHAGE) 1000 MG tablet TAKE 1 TABLET BY MOUTH 2 (TWO) TIMES DAILY WITH MEALS. INDICATIONS: TYPE 2 DIABETES MELLITUS     • aspirin 81 MG chewable tablet Chew 81 mg Every Night.     • Bacitracin Zinc (MAGIC BUTT OINTMENT) Apply 1 each topically 2 (Two) Times a Day. 120 g 1   • Cholecalciferol (VITAMIN D3) 5000 UNITS capsule capsule Take 5,000 Units by mouth Every Night.     • Cyanocobalamin (VITAMIN B 12 PO) Take 1,000 mcg by mouth Every Night.     •  "doxycycline (VIBRAMYCIN) 100 MG capsule Take 1 capsule by mouth 2 (Two) Times a Day. 28 capsule 0   • dronedarone (MULTAQ) 400 MG tablet Take 400 mg by mouth Every Night.     • fluticasone (FLONASE) 50 MCG/ACT nasal spray 2 sprays into each nostril As Needed for Rhinitis.     • glimepiride (AMARYL) 2 MG tablet Take 4 mg by mouth 2 (Two) Times a Day.     • HYDROcodone-acetaminophen (NORCO) 7.5-325 MG per tablet Take 1 tablet by mouth Every 6 (Six) Hours As Needed for Moderate Pain . 30 tablet 0   • magnesium oxide (MAGOX) 400 (241.3 Mg) MG tablet tablet Take 400 mg by mouth 2 (Two) Times a Day.     • metFORMIN (GLUCOPHAGE) 1000 MG tablet Take 1,000 mg by mouth 2 (Two) Times a Day With Meals. Patient states he only uses when needed     • metoprolol tartrate (LOPRESSOR) 25 MG tablet Take 12.5 mg by mouth Every Night.     • ONE TOUCH ULTRA TEST test strip USE TO TEST BLOOD SUGAR THREE TIMES A DAY  1   • valsartan (DIOVAN) 160 MG tablet Take 40 mg by mouth Every Night. 1/2 tablet daily      • vitamin C (ASCORBIC ACID) 500 MG tablet Take 500 mg by mouth Every Night.     • vitamin E 200 UNIT capsule Take 200 Units by mouth Every Night.       No current facility-administered medications for this visit.            OBJECTIVE    Ht 193 cm (76\")   Wt 126 kg (278 lb)   BMI 33.84 kg/m²     Review of Systems   Constitutional: Negative.    HENT: Negative.    Eyes: Negative.    Respiratory: Negative.    Cardiovascular: Negative.    Gastrointestinal: Negative.    Endocrine: Negative.    Genitourinary: Negative.    Musculoskeletal: Negative.    Skin: Positive for wound.   Neurological: Negative.    Psychiatric/Behavioral: Negative.            Constitutional: well developed, well nourished     HEENT: Normocephalic and atraumatic, normal hearing     Respiratory: Non labored respirations noted    Cardiovascular:    CFT brisk  to all digits  Skin temp is warm to warm from proximal tibia to distal digits  Pedal hair growth diminished. "   No erythema or edema noted      Musculoskeletal:  TMA noted Right foot  Left hallux slightly abducted  Digits 2 through 5 left are rectus     Dermatological:   Skin is warm, dry and intact    Webspaces 1-4 bilateral are clean, dry and intact.   No subcutaneous nodules or masses noted    Full-thickness ulceration noted to the plantar left midfoot.  Ulcer measures 1.2 x 0.8 x 0.1 cm.  No purulent drainage or foul odor noted.  It does not probe to bone.       Neurological:   Protective sensation absent  Sensation intact to light touch    DTR intact     Psychiatric: A&O x 3 with normal mood and affect. NAD.       Procedures        ASSESSMENT AND PLAN    Emre was seen today for foot ulcer.    Diagnoses and all orders for this visit:    Charcot's joint of left foot    Skin ulcer of left foot, limited to breakdown of skin (CMS/HCC)      - ulcer is decreasing in size  - Apply total contact cast  - All his questions were answered  - RTC 1 week          This document has been electronically signed by Marco A Thompson DPM on July 24, 2018 12:02 PM

## 2018-07-31 ENCOUNTER — OFFICE VISIT (OUTPATIENT)
Dept: PODIATRY | Facility: CLINIC | Age: 58
End: 2018-07-31

## 2018-07-31 VITALS — BODY MASS INDEX: 33.85 KG/M2 | HEIGHT: 76 IN | WEIGHT: 278 LBS | OXYGEN SATURATION: 98 % | HEART RATE: 89 BPM

## 2018-07-31 DIAGNOSIS — L97.521 SKIN ULCER OF LEFT FOOT, LIMITED TO BREAKDOWN OF SKIN (HCC): Primary | ICD-10-CM

## 2018-07-31 DIAGNOSIS — M14.672 CHARCOT'S JOINT OF LEFT FOOT: ICD-10-CM

## 2018-07-31 PROCEDURE — 29445 APPL RIGID TOT CNTC LEG CAST: CPT | Performed by: PODIATRIST

## 2018-07-31 NOTE — PROGRESS NOTES
Emre Lisa  1960  58 y.o. male     Patient presents today for a recheck of his left foot ulcer.    07/31/18  Chief Complaint   Patient presents with   • Left Foot - Follow-up, Skin Ulcer, TCC change       History of Present Illness    Patient presents to clinic today for follow-up.  He is ambulating in a total contact cast.  He denies pain.    Past Medical History:   Diagnosis Date   • Arthritis    • Atrial fibrillation (CMS/HCC)    • Diabetes mellitus (CMS/HCC)    • Diabetic foot ulcer associated with type 2 diabetes mellitus (CMS/HCC)     left great toe   • Hallux malleus of left foot    • Hammer toe    • Hypertension    • MI (myocardial infarction)    • Neuropathy in diabetes (CMS/HCC)    • Onychomycosis          Past Surgical History:   Procedure Laterality Date   • AMPUTATION DIGIT Right 3/5/2017    Procedure: RIGHT AMPUTATION TRANSMETATRASAL , RECESSION GASTROCNEMOUS;  Surgeon: Marco A Thompson DPM;  Location: [facility];  Service:    • CARDIAC DEFIBRILLATOR PLACEMENT  2010, 2016   • CARPAL TUNNEL RELEASE  2015    elbow and wrist left arm   • FOOT IRRIGATION, DEBRIDEMENT AND REPAIR Left 3/7/2018    Procedure: FOOT IRRIGATION, DEBRIDEMENT AND REPAIR;  Surgeon: Marco A Thompson DPM;  Location: [facility];  Service:    • FOOT IRRIGATION, DEBRIDEMENT AND REPAIR Left 3/10/2018    Procedure: FOOT IRRIGATION, DEBRIDEMENT AND REPAIR;  Surgeon: Marco A Thompson DPM;  Location: [facility];  Service: Podiatry   • FOOT SURGERY     • FOOT WOUND CLOSURE Right 3/2/2017    Procedure: INCISION AND DRAINAGE, RIGHT FOOT;  Surgeon: Tung Rosenthal DPM;  Location: [facility] OR;  Service:    • HAMMER TOE REPAIR Left 2/15/2018    Procedure: HAMMERTOE CORRECTION LEFT SECOND, THIRD AND FOURTH TOES AND HALLUX INTERPHALANGEAL JOINT ARTHRODESIS LEFT FOOT (Micro Asnis, 4.0 & 5.0 Asnis)      (c-arm);  Surgeon: Marco A Thompson DPM;  Location: [facility];  Service:    • HARDWARE REMOVAL Left 3/5/2018    Procedure: ANKLE/FOOT HARDWARE  REMOVAL;  Surgeon: Marco A Thompson DPM;  Location: [facility];  Service:    • INCISION AND DRAINAGE LEG Left 3/5/2018    Procedure: INCISION AND DRAINAGE LOWER EXTREMITY;  Surgeon: Marco A Thompson DPM;  Location: [facility];  Service:    • TOE AMPUTATION Right 2016   • TONSILECTOMY, ADENOIDECTOMY, BILATERAL MYRINGOTOMY AND TUBES      age 25         Family History   Problem Relation Age of Onset   • Diabetes Father    • Stroke Father    • No Known Problems Maternal Grandmother    • No Known Problems Maternal Grandfather    • No Known Problems Paternal Grandmother    • No Known Problems Paternal Grandfather    • No Known Problems Son    • No Known Problems Daughter    • No Known Problems Daughter          Social History     Social History   • Marital status:      Spouse name: N/A   • Number of children: N/A   • Years of education: N/A     Occupational History   • Not on file.     Social History Main Topics   • Smoking status: Never Smoker   • Smokeless tobacco: Never Used   • Alcohol use No   • Drug use: No   • Sexual activity: Defer     Other Topics Concern   • Not on file     Social History Narrative   • No narrative on file         Current Outpatient Prescriptions   Medication Sig Dispense Refill   • aspirin 81 MG chewable tablet Chew 81 mg Every Night.     • Bacitracin Zinc (MAGIC BUTT OINTMENT) Apply 1 each topically 2 (Two) Times a Day. 120 g 1   • Cholecalciferol (VITAMIN D3) 5000 UNITS capsule capsule Take 5,000 Units by mouth Every Night.     • Cyanocobalamin (VITAMIN B 12 PO) Take 1,000 mcg by mouth Every Night.     • doxycycline (VIBRAMYCIN) 100 MG capsule Take 1 capsule by mouth 2 (Two) Times a Day. 28 capsule 0   • dronedarone (MULTAQ) 400 MG tablet Take 400 mg by mouth Every Night.     • fluticasone (FLONASE) 50 MCG/ACT nasal spray 2 sprays into each nostril As Needed for Rhinitis.     • fluticasone (FLONASE) 50 MCG/ACT nasal spray 1-2 sprays into the nostril(s) as directed by provider.     •  "glimepiride (AMARYL) 2 MG tablet Take 4 mg by mouth 2 (Two) Times a Day.     • glimepiride (AMARYL) 2 MG tablet TAKE 1 TABLET BY MOUTH 2 (TWO) TIMES DAILY WITH FOOD     • HYDROcodone-acetaminophen (NORCO) 7.5-325 MG per tablet Take 1 tablet by mouth Every 6 (Six) Hours As Needed for Moderate Pain . 30 tablet 0   • magnesium oxide (MAGOX) 400 (241.3 Mg) MG tablet tablet Take 400 mg by mouth 2 (Two) Times a Day.     • metFORMIN (GLUCOPHAGE) 1000 MG tablet Take 1,000 mg by mouth 2 (Two) Times a Day With Meals. Patient states he only uses when needed     • metFORMIN (GLUCOPHAGE) 1000 MG tablet TAKE 1 TABLET BY MOUTH 2 (TWO) TIMES DAILY WITH MEALS. INDICATIONS: TYPE 2 DIABETES MELLITUS     • metoprolol tartrate (LOPRESSOR) 25 MG tablet Take 12.5 mg by mouth Every Night.     • ONE TOUCH ULTRA TEST test strip USE TO TEST BLOOD SUGAR THREE TIMES A DAY  1   • valsartan (DIOVAN) 160 MG tablet Take 40 mg by mouth Every Night. 1/2 tablet daily      • vitamin C (ASCORBIC ACID) 500 MG tablet Take 500 mg by mouth Every Night.     • vitamin E 200 UNIT capsule Take 200 Units by mouth Every Night.       No current facility-administered medications for this visit.            OBJECTIVE    Pulse 89   Ht 193 cm (76\")   Wt 126 kg (278 lb)   SpO2 98%   BMI 33.84 kg/m²     Review of Systems   Constitutional: Negative.    HENT: Negative.    Respiratory: Negative.    Cardiovascular: Negative.    Musculoskeletal: Negative.    Skin: Positive for wound.   Neurological: Negative.    Psychiatric/Behavioral: Negative.            Constitutional: well developed, well nourished     HEENT: Normocephalic and atraumatic, normal hearing     Respiratory: Non labored respirations noted    Cardiovascular:    CFT brisk  to all digits  Skin temp is warm to warm from proximal tibia to distal digits  Pedal hair growth diminished.   No erythema or edema noted      Musculoskeletal:  TMA noted Right foot  Left hallux slightly abducted  Digits 2 through 5 left " are rectus     Dermatological:   Skin is warm, dry and intact    Webspaces 1-4 bilateral are clean, dry and intact.   No subcutaneous nodules or masses noted    Full-thickness ulceration noted to the plantar left midfoot.  Ulcer measures 0.7 x 0.7 x 0.1cm     Neurological:   Protective sensation absent  Sensation intact to light touch        Psychiatric: A&O x 3 with normal mood and affect. NAD.       Procedures        ASSESSMENT AND PLAN    Emre was seen today for follow-up, skin ulcer and tcc change.    Diagnoses and all orders for this visit:    Skin ulcer of left foot, limited to breakdown of skin (CMS/HCC)    Charcot's joint of left foot      - ulcer is healing nicely  - re-applied total contact cast  - All his questions were answered  - RTC 1 week          This document has been electronically signed by Marco A Thompson DPM on July 31, 2018 4:41 PM

## 2018-08-07 ENCOUNTER — OFFICE VISIT (OUTPATIENT)
Dept: PODIATRY | Facility: CLINIC | Age: 58
End: 2018-08-07

## 2018-08-07 VITALS — BODY MASS INDEX: 33.83 KG/M2 | HEIGHT: 76 IN | WEIGHT: 277.78 LBS

## 2018-08-07 DIAGNOSIS — L97.521 SKIN ULCER OF LEFT FOOT, LIMITED TO BREAKDOWN OF SKIN (HCC): Primary | ICD-10-CM

## 2018-08-07 PROCEDURE — 29445 APPL RIGID TOT CNTC LEG CAST: CPT | Performed by: PODIATRIST

## 2018-08-07 NOTE — PROGRESS NOTES
Emre Lisa  1960  58 y.o. male   BS-101  Patient presents today for a recheck of his left foot ulcer.    08/07/18  Chief Complaint   Patient presents with   • Left Foot - Wound Check       History of Present Illness    Patient presents to clinic today for follow-up.  He is ambulating in a total contact cast.      Past Medical History:   Diagnosis Date   • Arthritis    • Atrial fibrillation (CMS/HCC)    • Diabetes mellitus (CMS/HCC)    • Diabetic foot ulcer associated with type 2 diabetes mellitus (CMS/HCC)     left great toe   • Hallux malleus of left foot    • Hammer toe    • Hypertension    • MI (myocardial infarction)    • Neuropathy in diabetes (CMS/HCC)    • Onychomycosis          Past Surgical History:   Procedure Laterality Date   • AMPUTATION DIGIT Right 3/5/2017    Procedure: RIGHT AMPUTATION TRANSMETATRASAL , RECESSION GASTROCNEMOUS;  Surgeon: Marco A Thompson DPM;  Location: [facility];  Service:    • CARDIAC DEFIBRILLATOR PLACEMENT  2010, 2016   • CARPAL TUNNEL RELEASE  2015    elbow and wrist left arm   • FOOT IRRIGATION, DEBRIDEMENT AND REPAIR Left 3/7/2018    Procedure: FOOT IRRIGATION, DEBRIDEMENT AND REPAIR;  Surgeon: Marco A Thompson DPM;  Location: [facility] OR;  Service:    • FOOT IRRIGATION, DEBRIDEMENT AND REPAIR Left 3/10/2018    Procedure: FOOT IRRIGATION, DEBRIDEMENT AND REPAIR;  Surgeon: Marco A Thompson DPM;  Location: [facility] OR;  Service: Podiatry   • FOOT SURGERY     • FOOT WOUND CLOSURE Right 3/2/2017    Procedure: INCISION AND DRAINAGE, RIGHT FOOT;  Surgeon: Tung Rosenthal DPM;  Location: [facility] OR;  Service:    • HAMMER TOE REPAIR Left 2/15/2018    Procedure: HAMMERTOE CORRECTION LEFT SECOND, THIRD AND FOURTH TOES AND HALLUX INTERPHALANGEAL JOINT ARTHRODESIS LEFT FOOT (Micro Asnis, 4.0 & 5.0 Asnis)      (c-arm);  Surgeon: Marco A Thompson DPM;  Location: [facility] OR;  Service:    • HARDWARE REMOVAL Left 3/5/2018    Procedure: ANKLE/FOOT HARDWARE REMOVAL;  Surgeon: Marco A FISCHER  CHRISTIAN Thompson;  Location: [facility];  Service:    • INCISION AND DRAINAGE LEG Left 3/5/2018    Procedure: INCISION AND DRAINAGE LOWER EXTREMITY;  Surgeon: Marco A Thompson DPM;  Location: [facility];  Service:    • TOE AMPUTATION Right 2016   • TONSILECTOMY, ADENOIDECTOMY, BILATERAL MYRINGOTOMY AND TUBES      age 25         Family History   Problem Relation Age of Onset   • Diabetes Father    • Stroke Father    • No Known Problems Maternal Grandmother    • No Known Problems Maternal Grandfather    • No Known Problems Paternal Grandmother    • No Known Problems Paternal Grandfather    • No Known Problems Son    • No Known Problems Daughter    • No Known Problems Daughter          Social History     Social History   • Marital status:      Spouse name: N/A   • Number of children: N/A   • Years of education: N/A     Occupational History   • Not on file.     Social History Main Topics   • Smoking status: Never Smoker   • Smokeless tobacco: Never Used   • Alcohol use No   • Drug use: No   • Sexual activity: Defer     Other Topics Concern   • Not on file     Social History Narrative   • No narrative on file         Current Outpatient Prescriptions   Medication Sig Dispense Refill   • aspirin 81 MG chewable tablet Chew 81 mg Every Night.     • Bacitracin Zinc (MAGIC BUTT OINTMENT) Apply 1 each topically 2 (Two) Times a Day. 120 g 1   • Cholecalciferol (VITAMIN D3) 5000 UNITS capsule capsule Take 5,000 Units by mouth Every Night.     • Cyanocobalamin (VITAMIN B 12 PO) Take 1,000 mcg by mouth Every Night.     • doxycycline (VIBRAMYCIN) 100 MG capsule Take 1 capsule by mouth 2 (Two) Times a Day. 28 capsule 0   • dronedarone (MULTAQ) 400 MG tablet Take 400 mg by mouth Every Night.     • fluticasone (FLONASE) 50 MCG/ACT nasal spray 2 sprays into each nostril As Needed for Rhinitis.     • fluticasone (FLONASE) 50 MCG/ACT nasal spray 1-2 sprays into the nostril(s) as directed by provider.     • glimepiride (AMARYL) 2 MG tablet  "Take 4 mg by mouth 2 (Two) Times a Day.     • glimepiride (AMARYL) 2 MG tablet TAKE 1 TABLET BY MOUTH 2 (TWO) TIMES DAILY WITH FOOD     • HYDROcodone-acetaminophen (NORCO) 7.5-325 MG per tablet Take 1 tablet by mouth Every 6 (Six) Hours As Needed for Moderate Pain . 30 tablet 0   • magnesium oxide (MAGOX) 400 (241.3 Mg) MG tablet tablet Take 400 mg by mouth 2 (Two) Times a Day.     • metFORMIN (GLUCOPHAGE) 1000 MG tablet Take 1,000 mg by mouth 2 (Two) Times a Day With Meals. Patient states he only uses when needed     • metFORMIN (GLUCOPHAGE) 1000 MG tablet TAKE 1 TABLET BY MOUTH 2 (TWO) TIMES DAILY WITH MEALS. INDICATIONS: TYPE 2 DIABETES MELLITUS     • metoprolol tartrate (LOPRESSOR) 25 MG tablet Take 12.5 mg by mouth Every Night.     • ONE TOUCH ULTRA TEST test strip USE TO TEST BLOOD SUGAR THREE TIMES A DAY  1   • valsartan (DIOVAN) 160 MG tablet Take 40 mg by mouth Every Night. 1/2 tablet daily      • vitamin C (ASCORBIC ACID) 500 MG tablet Take 500 mg by mouth Every Night.     • vitamin E 200 UNIT capsule Take 200 Units by mouth Every Night.       No current facility-administered medications for this visit.            OBJECTIVE    Ht 193 cm (75.98\")   Wt 126 kg (277 lb 12.5 oz)   BMI 33.83 kg/m²     Review of Systems   Constitutional: Negative.    HENT: Negative.    Respiratory: Negative.    Cardiovascular: Negative.    Musculoskeletal: Negative.    Skin: Positive for wound.   Neurological: Negative.    Psychiatric/Behavioral: Negative.            Constitutional: well developed, well nourished     HEENT: Normocephalic and atraumatic, normal hearing     Respiratory: Non labored respirations noted    Cardiovascular:    CFT brisk  to all digits  Skin temp is warm to warm from proximal tibia to distal digits  Pedal hair growth diminished.   No erythema or edema noted      Musculoskeletal:  TMA noted Right foot  Left hallux slightly abducted  Digits 2 through 5 left are rectus     Dermatological:   Skin is warm, " dry and intact    Webspaces 1-4 bilateral are clean, dry and intact.   No subcutaneous nodules or masses noted    Full-thickness ulceration noted to the plantar left midfoot.  Ulcer measures 0.3 x 0.2  With no appreciable depth     Neurological:   Protective sensation absent  Sensation intact to light touch        Psychiatric: A&O x 3 with normal mood and affect. NAD.       Procedures        ASSESSMENT AND PLAN    Emre was seen today for wound check.    Diagnoses and all orders for this visit:    Skin ulcer of left foot, limited to breakdown of skin (CMS/HCC)      - ulcer is nearly healed  - re-applied total contact cast  - All his questions were answered  - RTC 1 week          This document has been electronically signed by Marco A Thompson DPM on August 7, 2018 8:21 AM

## 2018-08-22 ENCOUNTER — OFFICE VISIT (OUTPATIENT)
Dept: PODIATRY | Facility: CLINIC | Age: 58
End: 2018-08-22

## 2018-08-22 VITALS — WEIGHT: 277 LBS | HEART RATE: 80 BPM | BODY MASS INDEX: 34.44 KG/M2 | OXYGEN SATURATION: 99 % | HEIGHT: 75 IN

## 2018-08-22 DIAGNOSIS — L97.521 SKIN ULCER OF LEFT FOOT, LIMITED TO BREAKDOWN OF SKIN (HCC): Primary | ICD-10-CM

## 2018-08-22 DIAGNOSIS — M14.672 CHARCOT'S JOINT OF LEFT FOOT: ICD-10-CM

## 2018-08-22 PROCEDURE — 99213 OFFICE O/P EST LOW 20 MIN: CPT | Performed by: PODIATRIST

## 2018-08-22 NOTE — PROGRESS NOTES
Emre Lisa  1960  58 y.o. male   BS-86 this morning 8-22-18 per patient  Patient presents today for a recheck of his left foot ulcer.    08/22/18  Chief Complaint   Patient presents with   • Left Foot - Follow-up       History of Present Illness    Patient presents to clinic today for follow-up.  He is ambulating in a total contact cast.  He presents with his new custom molded diabetic shoes.     Past Medical History:   Diagnosis Date   • Arthritis    • Atrial fibrillation (CMS/HCC)    • Diabetes mellitus (CMS/HCC)    • Diabetic foot ulcer associated with type 2 diabetes mellitus (CMS/HCC)     left great toe   • Hallux malleus of left foot    • Hammer toe    • Hypertension    • MI (myocardial infarction)    • Neuropathy in diabetes (CMS/HCC)    • Onychomycosis          Past Surgical History:   Procedure Laterality Date   • AMPUTATION DIGIT Right 3/5/2017    Procedure: RIGHT AMPUTATION TRANSMETATRASAL , RECESSION GASTROCNEMOUS;  Surgeon: Marco A Thompson DPM;  Location: [facility];  Service:    • CARDIAC DEFIBRILLATOR PLACEMENT  2010, 2016   • CARPAL TUNNEL RELEASE  2015    elbow and wrist left arm   • FOOT IRRIGATION, DEBRIDEMENT AND REPAIR Left 3/7/2018    Procedure: FOOT IRRIGATION, DEBRIDEMENT AND REPAIR;  Surgeon: Marco A Thompson DPM;  Location: [facility] OR;  Service:    • FOOT IRRIGATION, DEBRIDEMENT AND REPAIR Left 3/10/2018    Procedure: FOOT IRRIGATION, DEBRIDEMENT AND REPAIR;  Surgeon: Marco A Thompson DPM;  Location: [facility] OR;  Service: Podiatry   • FOOT SURGERY     • FOOT WOUND CLOSURE Right 3/2/2017    Procedure: INCISION AND DRAINAGE, RIGHT FOOT;  Surgeon: Tung Rosenthal DPM;  Location: [facility] OR;  Service:    • HAMMER TOE REPAIR Left 2/15/2018    Procedure: HAMMERTOE CORRECTION LEFT SECOND, THIRD AND FOURTH TOES AND HALLUX INTERPHALANGEAL JOINT ARTHRODESIS LEFT FOOT (Micro Asnis, 4.0 & 5.0 Asnis)      (c-arm);  Surgeon: Marco A Thompson DPM;  Location: [facility] OR;  Service:    • HARDWARE  REMOVAL Left 3/5/2018    Procedure: ANKLE/FOOT HARDWARE REMOVAL;  Surgeon: Marco A Thompson DPM;  Location: [facility] OR;  Service:    • INCISION AND DRAINAGE LEG Left 3/5/2018    Procedure: INCISION AND DRAINAGE LOWER EXTREMITY;  Surgeon: Marco A Thompson DPM;  Location: [facility];  Service:    • TOE AMPUTATION Right 2016   • TONSILECTOMY, ADENOIDECTOMY, BILATERAL MYRINGOTOMY AND TUBES      age 25         Family History   Problem Relation Age of Onset   • Diabetes Father    • Stroke Father    • No Known Problems Maternal Grandmother    • No Known Problems Maternal Grandfather    • No Known Problems Paternal Grandmother    • No Known Problems Paternal Grandfather    • No Known Problems Son    • No Known Problems Daughter    • No Known Problems Daughter          Social History     Social History   • Marital status:      Spouse name: N/A   • Number of children: N/A   • Years of education: N/A     Occupational History   • Not on file.     Social History Main Topics   • Smoking status: Never Smoker   • Smokeless tobacco: Never Used   • Alcohol use No   • Drug use: No   • Sexual activity: Defer     Other Topics Concern   • Not on file     Social History Narrative   • No narrative on file         Current Outpatient Prescriptions   Medication Sig Dispense Refill   • aspirin 81 MG chewable tablet Chew 81 mg Every Night.     • Bacitracin Zinc (MAGIC BUTT OINTMENT) Apply 1 each topically 2 (Two) Times a Day. 120 g 1   • Cholecalciferol (VITAMIN D3) 5000 UNITS capsule capsule Take 5,000 Units by mouth Every Night.     • Cyanocobalamin (VITAMIN B 12 PO) Take 1,000 mcg by mouth Every Night.     • doxycycline (VIBRAMYCIN) 100 MG capsule Take 1 capsule by mouth 2 (Two) Times a Day. 28 capsule 0   • dronedarone (MULTAQ) 400 MG tablet Take 400 mg by mouth Every Night.     • fluticasone (FLONASE) 50 MCG/ACT nasal spray 2 sprays into each nostril As Needed for Rhinitis.     • fluticasone (FLONASE) 50 MCG/ACT nasal spray 1-2 sprays  "into the nostril(s) as directed by provider.     • glimepiride (AMARYL) 2 MG tablet Take 4 mg by mouth 2 (Two) Times a Day.     • glimepiride (AMARYL) 2 MG tablet TAKE 1 TABLET BY MOUTH 2 (TWO) TIMES DAILY WITH FOOD     • HYDROcodone-acetaminophen (NORCO) 7.5-325 MG per tablet Take 1 tablet by mouth Every 6 (Six) Hours As Needed for Moderate Pain . 30 tablet 0   • magnesium oxide (MAGOX) 400 (241.3 Mg) MG tablet tablet Take 400 mg by mouth 2 (Two) Times a Day.     • metFORMIN (GLUCOPHAGE) 1000 MG tablet Take 1,000 mg by mouth 2 (Two) Times a Day With Meals. Patient states he only uses when needed     • metFORMIN (GLUCOPHAGE) 1000 MG tablet TAKE 1 TABLET BY MOUTH 2 (TWO) TIMES DAILY WITH MEALS. INDICATIONS: TYPE 2 DIABETES MELLITUS     • metoprolol tartrate (LOPRESSOR) 25 MG tablet Take 12.5 mg by mouth Every Night.     • ONE TOUCH ULTRA TEST test strip USE TO TEST BLOOD SUGAR THREE TIMES A DAY  1   • valsartan (DIOVAN) 160 MG tablet Take 40 mg by mouth Every Night. 1/2 tablet daily      • vitamin C (ASCORBIC ACID) 500 MG tablet Take 500 mg by mouth Every Night.     • vitamin E 200 UNIT capsule Take 200 Units by mouth Every Night.       No current facility-administered medications for this visit.            OBJECTIVE    Pulse 80   Ht 190.5 cm (75\")   Wt 126 kg (277 lb)   SpO2 99%   BMI 34.62 kg/m²     Review of Systems   Constitutional: Negative.    HENT: Negative.    Respiratory: Negative.    Cardiovascular: Negative.    Musculoskeletal: Negative.    Skin: Positive for wound.   Neurological: Negative.    Psychiatric/Behavioral: Negative.            Constitutional: well developed, well nourished     HEENT: Normocephalic and atraumatic, normal hearing     Respiratory: Non labored respirations noted    Cardiovascular:    CFT brisk  to all digits  Skin temp is warm to warm from proximal tibia to distal digits  Pedal hair growth diminished.   No erythema or edema noted      Musculoskeletal:  TMA noted Right " foot  Left hallux slightly abducted  Digits 2 through 5 left are rectus     Dermatological:   Skin is warm, dry and intact    Webspaces 1-4 bilateral are clean, dry and intact.   No subcutaneous nodules or masses noted    Previous left plantar foot ulceration is healed.      Neurological:   Protective sensation absent  Sensation intact to light touch        Psychiatric: A&O x 3 with normal mood and affect. NAD.       Procedures        ASSESSMENT AND PLAN    Emre was seen today for follow-up.    Diagnoses and all orders for this visit:    Skin ulcer of left foot, limited to breakdown of skin (CMS/HCC)    Charcot's joint of left foot      - ulcer is healed  - transition to diabetic shoes  - monitor foot closely  - re-discussed plantar planing in the future if ulcer reopens  - All his questions were answered  - RTC 2 weeks          This document has been electronically signed by Marco A Thompson DPM on August 22, 2018 9:18 AM

## 2018-09-05 ENCOUNTER — OFFICE VISIT (OUTPATIENT)
Dept: PODIATRY | Facility: CLINIC | Age: 58
End: 2018-09-05

## 2018-09-05 VITALS — OXYGEN SATURATION: 98 % | HEART RATE: 87 BPM | BODY MASS INDEX: 34.44 KG/M2 | WEIGHT: 277 LBS | HEIGHT: 75 IN

## 2018-09-05 DIAGNOSIS — M14.672 CHARCOT'S JOINT OF LEFT FOOT: ICD-10-CM

## 2018-09-05 DIAGNOSIS — L97.521 SKIN ULCER OF LEFT FOOT, LIMITED TO BREAKDOWN OF SKIN (HCC): Primary | ICD-10-CM

## 2018-09-05 PROCEDURE — 99213 OFFICE O/P EST LOW 20 MIN: CPT | Performed by: PODIATRIST

## 2018-09-05 RX ORDER — LOSARTAN POTASSIUM 25 MG/1
25 TABLET ORAL NIGHTLY
COMMUNITY
Start: 2018-08-17 | End: 2021-12-15

## 2018-09-05 RX ORDER — METOPROLOL SUCCINATE 25 MG/1
TABLET, EXTENDED RELEASE ORAL
COMMUNITY
Start: 2018-08-30 | End: 2019-11-11

## 2018-09-05 NOTE — PROGRESS NOTES
Emre Lisa  1960  58 y.o. male   PCP-on 12-29-17  BS-92 this morning 9-05-18 per patient  Patient presents today for a recheck of his left foot ulcer.  Patient states no pain today.    09/05/18  Chief Complaint   Patient presents with   • Left Foot - Follow-up       History of Present Illness    Patient presents to clinic today for follow-up.  He is ambulating in his diabetic shoes. He has no pedal complaints at this time.     Past Medical History:   Diagnosis Date   • Arthritis    • Atrial fibrillation (CMS/HCC)    • Diabetes mellitus (CMS/HCC)    • Diabetic foot ulcer associated with type 2 diabetes mellitus (CMS/HCC)     left great toe   • Hallux malleus of left foot    • Hammer toe    • Hypertension    • MI (myocardial infarction)    • Neuropathy in diabetes (CMS/HCC)    • Onychomycosis          Past Surgical History:   Procedure Laterality Date   • AMPUTATION DIGIT Right 3/5/2017    Procedure: RIGHT AMPUTATION TRANSMETATRASAL , RECESSION GASTROCNEMOUS;  Surgeon: Marco A Thompson DPM;  Location: [facility];  Service:    • CARDIAC DEFIBRILLATOR PLACEMENT  2010, 2016   • CARPAL TUNNEL RELEASE  2015    elbow and wrist left arm   • FOOT IRRIGATION, DEBRIDEMENT AND REPAIR Left 3/7/2018    Procedure: FOOT IRRIGATION, DEBRIDEMENT AND REPAIR;  Surgeon: Marco A Thompson DPM;  Location: [facility];  Service:    • FOOT IRRIGATION, DEBRIDEMENT AND REPAIR Left 3/10/2018    Procedure: FOOT IRRIGATION, DEBRIDEMENT AND REPAIR;  Surgeon: Marco A Thompson DPM;  Location: [facility];  Service: Podiatry   • FOOT SURGERY     • FOOT WOUND CLOSURE Right 3/2/2017    Procedure: INCISION AND DRAINAGE, RIGHT FOOT;  Surgeon: Tung Rosenthal DPM;  Location: [facility];  Service:    • HAMMER TOE REPAIR Left 2/15/2018    Procedure: HAMMERTOE CORRECTION LEFT SECOND, THIRD AND FOURTH TOES AND HALLUX INTERPHALANGEAL JOINT ARTHRODESIS LEFT FOOT (Micro Asnis, 4.0 & 5.0 Asnis)      (c-arm);  Surgeon: Marco A Thompson DPM;  Location: [facility]  OR;  Service:    • HARDWARE REMOVAL Left 3/5/2018    Procedure: ANKLE/FOOT HARDWARE REMOVAL;  Surgeon: Marco A Thompson DPM;  Location: [facility] OR;  Service:    • INCISION AND DRAINAGE LEG Left 3/5/2018    Procedure: INCISION AND DRAINAGE LOWER EXTREMITY;  Surgeon: Marco A Thompson DPM;  Location: [facility] OR;  Service:    • TOE AMPUTATION Right 2016   • TONSILECTOMY, ADENOIDECTOMY, BILATERAL MYRINGOTOMY AND TUBES      age 25         Family History   Problem Relation Age of Onset   • Diabetes Father    • Stroke Father    • No Known Problems Maternal Grandmother    • No Known Problems Maternal Grandfather    • No Known Problems Paternal Grandmother    • No Known Problems Paternal Grandfather    • No Known Problems Son    • No Known Problems Daughter    • No Known Problems Daughter          Social History     Social History   • Marital status:      Spouse name: N/A   • Number of children: N/A   • Years of education: N/A     Occupational History   • Not on file.     Social History Main Topics   • Smoking status: Never Smoker   • Smokeless tobacco: Never Used   • Alcohol use No   • Drug use: No   • Sexual activity: Defer     Other Topics Concern   • Not on file     Social History Narrative   • No narrative on file         Current Outpatient Prescriptions   Medication Sig Dispense Refill   • aspirin 81 MG chewable tablet Chew 81 mg Every Night.     • Bacitracin Zinc (MAGIC BUTT OINTMENT) Apply 1 each topically 2 (Two) Times a Day. 120 g 1   • Cholecalciferol (VITAMIN D3) 5000 UNITS capsule capsule Take 5,000 Units by mouth Every Night.     • Cyanocobalamin (VITAMIN B 12 PO) Take 1,000 mcg by mouth Every Night.     • doxycycline (VIBRAMYCIN) 100 MG capsule Take 1 capsule by mouth 2 (Two) Times a Day. 28 capsule 0   • dronedarone (MULTAQ) 400 MG tablet Take 400 mg by mouth Every Night.     • fluticasone (FLONASE) 50 MCG/ACT nasal spray 2 sprays into each nostril As Needed for Rhinitis.     • glimepiride (AMARYL) 2 MG  "tablet Take 4 mg by mouth 2 (Two) Times a Day.     • glimepiride (AMARYL) 2 MG tablet TAKE 1 TABLET BY MOUTH 2 (TWO) TIMES DAILY WITH FOOD     • HYDROcodone-acetaminophen (NORCO) 7.5-325 MG per tablet Take 1 tablet by mouth Every 6 (Six) Hours As Needed for Moderate Pain . 30 tablet 0   • losartan (COZAAR) 25 MG tablet      • magnesium oxide (MAGOX) 400 (241.3 Mg) MG tablet tablet Take 400 mg by mouth 2 (Two) Times a Day.     • metFORMIN (GLUCOPHAGE) 1000 MG tablet Take 1,000 mg by mouth 2 (Two) Times a Day With Meals. Patient states he only uses when needed     • metFORMIN (GLUCOPHAGE) 1000 MG tablet TAKE 1 TABLET BY MOUTH 2 (TWO) TIMES DAILY WITH MEALS. INDICATIONS: TYPE 2 DIABETES MELLITUS     • metoprolol succinate XL (TOPROL-XL) 25 MG 24 hr tablet      • metoprolol tartrate (LOPRESSOR) 25 MG tablet Take 12.5 mg by mouth Every Night.     • ONE TOUCH ULTRA TEST test strip USE TO TEST BLOOD SUGAR THREE TIMES A DAY  1   • valsartan (DIOVAN) 160 MG tablet Take 40 mg by mouth Every Night. 1/2 tablet daily      • vitamin C (ASCORBIC ACID) 500 MG tablet Take 500 mg by mouth Every Night.     • vitamin E 200 UNIT capsule Take 200 Units by mouth Every Night.       No current facility-administered medications for this visit.            OBJECTIVE    Pulse 87   Ht 190.5 cm (75\")   Wt 126 kg (277 lb)   SpO2 98%   BMI 34.62 kg/m²     Review of Systems   Constitutional: Negative.    HENT: Negative.    Respiratory: Negative.    Cardiovascular: Negative.    Musculoskeletal: Negative.    Skin: Negative.    Neurological: Negative.    Psychiatric/Behavioral: Negative.            Constitutional: well developed, well nourished     HEENT: Normocephalic and atraumatic, normal hearing     Respiratory: Non labored respirations noted    Cardiovascular:    CFT brisk  to all digits  Skin temp is warm to warm from proximal tibia to distal digits  Pedal hair growth diminished.   No erythema or edema noted      Musculoskeletal:  TMA noted " Right foot  Left hallux slightly abducted  Digits 2 through 5 left are rectus     Dermatological:   Skin is warm, dry and intact    Webspaces 1-4 bilateral are clean, dry and intact.   No subcutaneous nodules or masses noted    Previous left plantar foot ulceration remains healed.      Neurological:   Protective sensation absent  Sensation intact to light touch        Psychiatric: A&O x 3 with normal mood and affect. NAD.       Procedures        ASSESSMENT AND PLAN    Emre was seen today for follow-up.    Diagnoses and all orders for this visit:    Skin ulcer of left foot, limited to breakdown of skin (CMS/HCC)    Charcot's joint of left foot      - ulcer is healed  - continue diabetic shoes  - All his questions were answered  - RTC 3 months           This document has been electronically signed by Marco A Thompson DPM on September 5, 2018 4:32 PM

## 2018-12-03 ENCOUNTER — OFFICE VISIT (OUTPATIENT)
Dept: PODIATRY | Facility: CLINIC | Age: 58
End: 2018-12-03

## 2018-12-03 VITALS — HEIGHT: 75 IN | BODY MASS INDEX: 39.17 KG/M2 | WEIGHT: 315 LBS

## 2018-12-03 DIAGNOSIS — L97.521 SKIN ULCER OF LEFT FOOT, LIMITED TO BREAKDOWN OF SKIN (HCC): Primary | ICD-10-CM

## 2018-12-03 DIAGNOSIS — M79.672 FOOT PAIN, LEFT: ICD-10-CM

## 2018-12-03 DIAGNOSIS — M14.672 CHARCOT'S JOINT OF LEFT FOOT: ICD-10-CM

## 2018-12-03 PROCEDURE — 99213 OFFICE O/P EST LOW 20 MIN: CPT | Performed by: PODIATRIST

## 2018-12-03 RX ORDER — VALSARTAN AND HYDROCHLOROTHIAZIDE 160; 12.5 MG/1; MG/1
TABLET, FILM COATED ORAL
COMMUNITY
End: 2019-11-11

## 2018-12-03 NOTE — PROGRESS NOTES
Emre Lisa  1960  58 y.o. male   PCP-Jamaal Bravo on May 2018  BS-130 this morning 12/3/18 per patient  Patient presents today with complaint of left foot pain. Patient states he stepping on something and rolled his foot and not there is a place on the bottom of his foot.    12/09/18  Chief Complaint   Patient presents with   • Left Foot - Follow-up, Wound Check       History of Present Illness    Mr. Lisa is a Dr. Thompson patient who presents today for evaluation of left plantar foot sore.  He has an extensive history of Charcot arthropathy and has custom shoes and insoles however he stepped states he stepped awkwardly over the weekend and noticed an increase in pain and swelling to the area.    Past Medical History:   Diagnosis Date   • Arthritis    • Atrial fibrillation (CMS/[facility])    • Diabetes mellitus (CMS/[facility])    • Diabetic foot ulcer associated with type 2 diabetes mellitus (CMS/HCC)     left great toe   • Hallux malleus of left foot    • Hammer toe    • Hypertension    • MI (myocardial infarction) (CMS/[facility])    • Neuropathy in diabetes (CMS/[facility])    • Onychomycosis          Past Surgical History:   Procedure Laterality Date   • CARDIAC DEFIBRILLATOR PLACEMENT  2010, 2016   • CARPAL TUNNEL RELEASE  2015    elbow and wrist left arm   • FOOT SURGERY     • TOE AMPUTATION Right 2016   • TONSILECTOMY, ADENOIDECTOMY, BILATERAL MYRINGOTOMY AND TUBES      age 25         Family History   Problem Relation Age of Onset   • Diabetes Father    • Stroke Father    • No Known Problems Maternal Grandmother    • No Known Problems Maternal Grandfather    • No Known Problems Paternal Grandmother    • No Known Problems Paternal Grandfather    • No Known Problems Son    • No Known Problems Daughter    • No Known Problems Daughter          Social History     Socioeconomic History   • Marital status:      Spouse name: Not on file   • Number of children: Not on file   • Years of education: Not on file   • Highest  education level: Not on file   Social Needs   • Financial resource strain: Not on file   • Food insecurity - worry: Not on file   • Food insecurity - inability: Not on file   • Transportation needs - medical: Not on file   • Transportation needs - non-medical: Not on file   Occupational History   • Not on file   Tobacco Use   • Smoking status: Never Smoker   • Smokeless tobacco: Never Used   Substance and Sexual Activity   • Alcohol use: No   • Drug use: No   • Sexual activity: Defer   Other Topics Concern   • Not on file   Social History Narrative   • Not on file         Current Outpatient Medications   Medication Sig Dispense Refill   • aspirin 81 MG chewable tablet Chew 81 mg Every Night.     • Bacitracin Zinc (MAGIC BUTT OINTMENT) Apply 1 each topically 2 (Two) Times a Day. 120 g 1   • Cholecalciferol (VITAMIN D3) 5000 UNITS capsule capsule Take 5,000 Units by mouth Every Night.     • Cyanocobalamin (VITAMIN B 12 PO) Take 1,000 mcg by mouth Every Night.     • doxycycline (VIBRAMYCIN) 100 MG capsule Take 1 capsule by mouth 2 (Two) Times a Day. 28 capsule 0   • dronedarone (MULTAQ) 400 MG tablet Take 400 mg by mouth Every Night.     • fluticasone (FLONASE) 50 MCG/ACT nasal spray 2 sprays into each nostril As Needed for Rhinitis.     • glimepiride (AMARYL) 2 MG tablet Take 4 mg by mouth 2 (Two) Times a Day.     • glimepiride (AMARYL) 2 MG tablet TAKE 1 TABLET BY MOUTH 2 (TWO) TIMES DAILY WITH FOOD     • HYDROcodone-acetaminophen (NORCO) 7.5-325 MG per tablet Take 1 tablet by mouth Every 6 (Six) Hours As Needed for Moderate Pain . 30 tablet 0   • losartan (COZAAR) 25 MG tablet      • magnesium oxide (MAGOX) 400 (241.3 Mg) MG tablet tablet Take 400 mg by mouth 2 (Two) Times a Day.     • metFORMIN (GLUCOPHAGE) 1000 MG tablet Take 1,000 mg by mouth 2 (Two) Times a Day With Meals. Patient states he only uses when needed     • metFORMIN (GLUCOPHAGE) 1000 MG tablet TAKE 1 TABLET BY MOUTH 2 (TWO) TIMES DAILY WITH MEALS.  "INDICATIONS: TYPE 2 DIABETES MELLITUS     • metoprolol succinate XL (TOPROL-XL) 25 MG 24 hr tablet      • metoprolol tartrate (LOPRESSOR) 25 MG tablet Take 12.5 mg by mouth Every Night.     • ONE TOUCH ULTRA TEST test strip USE TO TEST BLOOD SUGAR THREE TIMES A DAY  1   • vitamin C (ASCORBIC ACID) 500 MG tablet Take 500 mg by mouth Every Night.     • vitamin E 200 UNIT capsule Take 200 Units by mouth Every Night.     • valsartan (DIOVAN) 160 MG tablet Take 40 mg by mouth Every Night. 1/2 tablet daily      • valsartan-hydrochlorothiazide (DIOVAN-HCT) 160-12.5 MG per tablet Take  by mouth.       No current facility-administered medications for this visit.            OBJECTIVE    Ht 190.5 cm (75\")   Wt (!) 143 kg (315 lb 6.4 oz)   BMI 39.42 kg/m²     Review of Systems   Constitutional: Negative.    HENT: Negative.    Respiratory: Negative.    Cardiovascular: Negative.    Musculoskeletal: Negative.    Skin: Negative.    Neurological: Negative.    Psychiatric/Behavioral: Negative.            Constitutional: well developed, well nourished     HEENT: Normocephalic and atraumatic, normal hearing     Respiratory: Non labored respirations noted    Cardiovascular:    CFT brisk  to all digits  Skin temp is warm to warm from proximal tibia to distal digits  Pedal hair growth diminished.   No erythema or edema noted      Musculoskeletal:  TMA noted Right foot  Left hallux slightly abducted  Digits 2 through 5 left are rectus     Dermatological:   Skin is warm, dry and intact    Webspaces 1-4 bilateral are clean, dry and intact.   No subcutaneous nodules or masses noted    Partial-thickness skin breakdown of left plantar lateral foot measuring 0.6 x 0.9 x 0.1 cm no signs of infection.     Neurological:   Protective sensation absent  Sensation intact to light touch        Psychiatric: A&O x 3 with normal mood and affect. NAD.       Procedures        ASSESSMENT AND PLAN    Emre was seen today for follow-up and wound " check.    Diagnoses and all orders for this visit:    Skin ulcer of left foot, limited to breakdown of skin (CMS/HCC)    Foot pain, left  -     XR Foot 3+ View Left    Charcot's joint of left foot      -Recurrent plantar left foot ulceration.  We'll restart medihoney dressing changes.  -Placed into offloading shoe to be worn for all weightbearing.  -Radiographs ordered and reviewed.  No significant progression of Charcot arthropathy.  -Recheck 1 week with Dr. Thompson          This document has been electronically signed by Tung Rosenthal DPM on December 9, 2018 2:48 PM

## 2018-12-11 ENCOUNTER — OFFICE VISIT (OUTPATIENT)
Dept: PODIATRY | Facility: CLINIC | Age: 58
End: 2018-12-11

## 2018-12-11 DIAGNOSIS — M14.672 CHARCOT'S JOINT OF LEFT FOOT: ICD-10-CM

## 2018-12-11 DIAGNOSIS — L97.422 DIABETIC ULCER OF LEFT MIDFOOT ASSOCIATED WITH TYPE 2 DIABETES MELLITUS, WITH FAT LAYER EXPOSED (HCC): Primary | ICD-10-CM

## 2018-12-11 DIAGNOSIS — E11.621 DIABETIC ULCER OF LEFT MIDFOOT ASSOCIATED WITH TYPE 2 DIABETES MELLITUS, WITH FAT LAYER EXPOSED (HCC): Primary | ICD-10-CM

## 2018-12-11 PROCEDURE — 11042 DBRDMT SUBQ TIS 1ST 20SQCM/<: CPT | Performed by: PODIATRIST

## 2018-12-11 NOTE — PROGRESS NOTES
Emre Bergeron Sloan  1960  58 y.o. male   PCP-Jamaal Bravo on May 2018  BS-130 this morning 12/3/18 per patient    12/11/18  No chief complaint on file.      History of Present Illness    Patient presents to clinic today follow-up of his left foot wound.  He saw Dr. Rosenthal last week.  He is currently ambulating offloading surgical shoe.  He denies pain.    Past Medical History:   Diagnosis Date   • Arthritis    • Atrial fibrillation (CMS/HCC)    • Diabetes mellitus (CMS/HCC)    • Diabetic foot ulcer associated with type 2 diabetes mellitus (CMS/HCC)     left great toe   • Hallux malleus of left foot    • Hammer toe    • Hypertension    • MI (myocardial infarction) (CMS/HCC)    • Neuropathy in diabetes (CMS/HCC)    • Onychomycosis          Past Surgical History:   Procedure Laterality Date   • CARDIAC DEFIBRILLATOR PLACEMENT  2010, 2016   • CARPAL TUNNEL RELEASE  2015    elbow and wrist left arm   • FOOT SURGERY     • TOE AMPUTATION Right 2016   • TONSILECTOMY, ADENOIDECTOMY, BILATERAL MYRINGOTOMY AND TUBES      age 25         Family History   Problem Relation Age of Onset   • Diabetes Father    • Stroke Father    • No Known Problems Maternal Grandmother    • No Known Problems Maternal Grandfather    • No Known Problems Paternal Grandmother    • No Known Problems Paternal Grandfather    • No Known Problems Son    • No Known Problems Daughter    • No Known Problems Daughter          Social History     Socioeconomic History   • Marital status:      Spouse name: Not on file   • Number of children: Not on file   • Years of education: Not on file   • Highest education level: Not on file   Social Needs   • Financial resource strain: Not on file   • Food insecurity - worry: Not on file   • Food insecurity - inability: Not on file   • Transportation needs - medical: Not on file   • Transportation needs - non-medical: Not on file   Occupational History   • Not on file   Tobacco Use   • Smoking status: Never  Smoker   • Smokeless tobacco: Never Used   Substance and Sexual Activity   • Alcohol use: No   • Drug use: No   • Sexual activity: Defer   Other Topics Concern   • Not on file   Social History Narrative   • Not on file         Current Outpatient Medications   Medication Sig Dispense Refill   • aspirin 81 MG chewable tablet Chew 81 mg Every Night.     • Bacitracin Zinc (MAGIC BUTT OINTMENT) Apply 1 each topically 2 (Two) Times a Day. 120 g 1   • Cholecalciferol (VITAMIN D3) 5000 UNITS capsule capsule Take 5,000 Units by mouth Every Night.     • Cyanocobalamin (VITAMIN B 12 PO) Take 1,000 mcg by mouth Every Night.     • doxycycline (VIBRAMYCIN) 100 MG capsule Take 1 capsule by mouth 2 (Two) Times a Day. 28 capsule 0   • dronedarone (MULTAQ) 400 MG tablet Take 400 mg by mouth Every Night.     • fluticasone (FLONASE) 50 MCG/ACT nasal spray 2 sprays into each nostril As Needed for Rhinitis.     • glimepiride (AMARYL) 2 MG tablet Take 4 mg by mouth 2 (Two) Times a Day.     • glimepiride (AMARYL) 2 MG tablet TAKE 1 TABLET BY MOUTH 2 (TWO) TIMES DAILY WITH FOOD     • HYDROcodone-acetaminophen (NORCO) 7.5-325 MG per tablet Take 1 tablet by mouth Every 6 (Six) Hours As Needed for Moderate Pain . 30 tablet 0   • losartan (COZAAR) 25 MG tablet      • magnesium oxide (MAGOX) 400 (241.3 Mg) MG tablet tablet Take 400 mg by mouth 2 (Two) Times a Day.     • metFORMIN (GLUCOPHAGE) 1000 MG tablet Take 1,000 mg by mouth 2 (Two) Times a Day With Meals. Patient states he only uses when needed     • metFORMIN (GLUCOPHAGE) 1000 MG tablet TAKE 1 TABLET BY MOUTH 2 (TWO) TIMES DAILY WITH MEALS. INDICATIONS: TYPE 2 DIABETES MELLITUS     • metoprolol succinate XL (TOPROL-XL) 25 MG 24 hr tablet      • metoprolol tartrate (LOPRESSOR) 25 MG tablet Take 12.5 mg by mouth Every Night.     • ONE TOUCH ULTRA TEST test strip USE TO TEST BLOOD SUGAR THREE TIMES A DAY  1   • valsartan (DIOVAN) 160 MG tablet Take 40 mg by mouth Every Night. 1/2 tablet  daily      • valsartan-hydrochlorothiazide (DIOVAN-HCT) 160-12.5 MG per tablet Take  by mouth.     • vitamin C (ASCORBIC ACID) 500 MG tablet Take 500 mg by mouth Every Night.     • vitamin E 200 UNIT capsule Take 200 Units by mouth Every Night.       No current facility-administered medications for this visit.            OBJECTIVE    There were no vitals taken for this visit.    Review of Systems   Constitutional: Negative.    HENT: Negative.    Respiratory: Negative.    Cardiovascular: Negative.    Musculoskeletal: Negative.    Skin: Positive for wound.   Neurological: Positive for numbness. Negative for dizziness.   Psychiatric/Behavioral: Negative.            Constitutional: well developed, well nourished     HEENT: Normocephalic and atraumatic, normal hearing     Respiratory: Non labored respirations noted    Cardiovascular:    CFT brisk  to all digits  Skin temp is warm to warm from proximal tibia to distal digits  Pedal hair growth diminished.   No erythema or edema noted      Musculoskeletal:  TMA noted Right foot  Left hallux slightly abducted  Digits 2 through 5 left are rectus     Dermatological:   Skin is warm, dry and intact    Webspaces 1-4 bilateral are clean, dry and intact.   No subcutaneous nodules or masses noted    Partial-thickness skin breakdown of left plantar lateral foot measuring 0.6 x 0.8 x 0.1 cm no signs of infection.     Neurological:   Protective sensation absent  Sensation intact to light touch        Psychiatric: A&O x 3 with normal mood and affect. NAD.       Procedures        ASSESSMENT AND PLAN    Diagnoses and all orders for this visit:    Diabetic ulcer of left midfoot associated with type 2 diabetes mellitus, with fat layer exposed (CMS/HCC)    Charcot's joint of left foot      - Ulcer noted in objective was excisionally debrided with a dermal curet down to and including subcutaneous tissue.  Post-debridement measurements are 2.0 x 2.0 x 0.2 cm.  - Ulcer was dressed.   Recommended total contact cast however patient declined.  - Brief discussion held regarding surgical planing  - Continue ambulation and offloading surgical shoe  - All his questions were answered  - Return to clinic 1            This document has been electronically signed by Marco A Thompson DPM on December 11, 2018 12:50 PM

## 2018-12-19 ENCOUNTER — OFFICE VISIT (OUTPATIENT)
Dept: PODIATRY | Facility: CLINIC | Age: 58
End: 2018-12-19

## 2018-12-19 VITALS — BODY MASS INDEX: 39 KG/M2 | WEIGHT: 312 LBS

## 2018-12-19 DIAGNOSIS — M14.672 CHARCOT'S JOINT OF LEFT FOOT: ICD-10-CM

## 2018-12-19 DIAGNOSIS — L97.422 DIABETIC ULCER OF LEFT MIDFOOT ASSOCIATED WITH TYPE 2 DIABETES MELLITUS, WITH FAT LAYER EXPOSED (HCC): Primary | ICD-10-CM

## 2018-12-19 DIAGNOSIS — E11.42 TYPE 2 DIABETES MELLITUS WITH PERIPHERAL NEUROPATHY (HCC): ICD-10-CM

## 2018-12-19 DIAGNOSIS — E11.621 DIABETIC ULCER OF LEFT MIDFOOT ASSOCIATED WITH TYPE 2 DIABETES MELLITUS, WITH FAT LAYER EXPOSED (HCC): Primary | ICD-10-CM

## 2018-12-19 PROCEDURE — 99024 POSTOP FOLLOW-UP VISIT: CPT | Performed by: PODIATRIST

## 2018-12-19 NOTE — PROGRESS NOTES
Emre Lisa  1960  58 y.o. male   PCP-Jamaal Bravo on May 2018  BS-130 this morning 12/3/18 per patient    12/19/18  Chief Complaint   Patient presents with   • Left Foot - Follow-up       History of Present Illness    Patient presents to clinic today follow-up of his left foot wound.   He is currently ambulating offloading surgical shoe.  He denies pain.    Past Medical History:   Diagnosis Date   • Arthritis    • Atrial fibrillation (CMS/HCC)    • Diabetes mellitus (CMS/HCC)    • Diabetic foot ulcer associated with type 2 diabetes mellitus (CMS/HCC)     left great toe   • Hallux malleus of left foot    • Hammer toe    • Hypertension    • MI (myocardial infarction) (CMS/HCC)    • Neuropathy in diabetes (CMS/HCC)    • Onychomycosis          Past Surgical History:   Procedure Laterality Date   • AMPUTATION DIGIT Right 3/5/2017    Procedure: RIGHT AMPUTATION TRANSMETATRASAL , RECESSION GASTROCNEMOUS;  Surgeon: Marco A Thompson DPM;  Location: [facility] OR;  Service:    • CARDIAC DEFIBRILLATOR PLACEMENT  2010, 2016   • CARPAL TUNNEL RELEASE  2015    elbow and wrist left arm   • FOOT IRRIGATION, DEBRIDEMENT AND REPAIR Left 3/7/2018    Procedure: FOOT IRRIGATION, DEBRIDEMENT AND REPAIR;  Surgeon: Marco A Thompson DPM;  Location: [facility] OR;  Service:    • FOOT IRRIGATION, DEBRIDEMENT AND REPAIR Left 3/10/2018    Procedure: FOOT IRRIGATION, DEBRIDEMENT AND REPAIR;  Surgeon: Marco A Thompson DPM;  Location: [facility] OR;  Service: Podiatry   • FOOT SURGERY     • FOOT WOUND CLOSURE Right 3/2/2017    Procedure: INCISION AND DRAINAGE, RIGHT FOOT;  Surgeon: Tung Rosenthal DPM;  Location: [facility] OR;  Service:    • HAMMER TOE REPAIR Left 2/15/2018    Procedure: HAMMERTOE CORRECTION LEFT SECOND, THIRD AND FOURTH TOES AND HALLUX INTERPHALANGEAL JOINT ARTHRODESIS LEFT FOOT (Micro Asnis, 4.0 & 5.0 Asnis)      (c-arm);  Surgeon: Marco A Thompson DPM;  Location: [facility] OR;  Service:    • HARDWARE REMOVAL Left 3/5/2018     Procedure: ANKLE/FOOT HARDWARE REMOVAL;  Surgeon: Marco A Thompson DPM;  Location: [facility];  Service:    • INCISION AND DRAINAGE LEG Left 3/5/2018    Procedure: INCISION AND DRAINAGE LOWER EXTREMITY;  Surgeon: Marco A Thompson DPM;  Location: [facility];  Service:    • TOE AMPUTATION Right 2016   • TONSILECTOMY, ADENOIDECTOMY, BILATERAL MYRINGOTOMY AND TUBES      age 25         Family History   Problem Relation Age of Onset   • Diabetes Father    • Stroke Father    • No Known Problems Maternal Grandmother    • No Known Problems Maternal Grandfather    • No Known Problems Paternal Grandmother    • No Known Problems Paternal Grandfather    • No Known Problems Son    • No Known Problems Daughter    • No Known Problems Daughter          Social History     Socioeconomic History   • Marital status:      Spouse name: Not on file   • Number of children: Not on file   • Years of education: Not on file   • Highest education level: Not on file   Social Needs   • Financial resource strain: Not on file   • Food insecurity - worry: Not on file   • Food insecurity - inability: Not on file   • Transportation needs - medical: Not on file   • Transportation needs - non-medical: Not on file   Occupational History   • Not on file   Tobacco Use   • Smoking status: Never Smoker   • Smokeless tobacco: Never Used   Substance and Sexual Activity   • Alcohol use: No   • Drug use: No   • Sexual activity: Defer   Other Topics Concern   • Not on file   Social History Narrative   • Not on file         Current Outpatient Medications   Medication Sig Dispense Refill   • aspirin 81 MG chewable tablet Chew 81 mg Every Night.     • Bacitracin Zinc (MAGIC BUTT OINTMENT) Apply 1 each topically 2 (Two) Times a Day. 120 g 1   • Cholecalciferol (VITAMIN D3) 5000 UNITS capsule capsule Take 5,000 Units by mouth Every Night.     • Cyanocobalamin (VITAMIN B 12 PO) Take 1,000 mcg by mouth Every Night.     • doxycycline (VIBRAMYCIN) 100 MG capsule Take 1  capsule by mouth 2 (Two) Times a Day. 28 capsule 0   • dronedarone (MULTAQ) 400 MG tablet Take 400 mg by mouth Every Night.     • fluticasone (FLONASE) 50 MCG/ACT nasal spray 2 sprays into each nostril As Needed for Rhinitis.     • glimepiride (AMARYL) 2 MG tablet Take 4 mg by mouth 2 (Two) Times a Day.     • glimepiride (AMARYL) 2 MG tablet TAKE 1 TABLET BY MOUTH 2 (TWO) TIMES DAILY WITH FOOD     • HYDROcodone-acetaminophen (NORCO) 7.5-325 MG per tablet Take 1 tablet by mouth Every 6 (Six) Hours As Needed for Moderate Pain . 30 tablet 0   • losartan (COZAAR) 25 MG tablet      • magnesium oxide (MAGOX) 400 (241.3 Mg) MG tablet tablet Take 400 mg by mouth 2 (Two) Times a Day.     • metFORMIN (GLUCOPHAGE) 1000 MG tablet Take 1,000 mg by mouth 2 (Two) Times a Day With Meals. Patient states he only uses when needed     • metFORMIN (GLUCOPHAGE) 1000 MG tablet TAKE 1 TABLET BY MOUTH 2 (TWO) TIMES DAILY WITH MEALS. INDICATIONS: TYPE 2 DIABETES MELLITUS     • metoprolol succinate XL (TOPROL-XL) 25 MG 24 hr tablet      • metoprolol tartrate (LOPRESSOR) 25 MG tablet Take 12.5 mg by mouth Every Night.     • ONE TOUCH ULTRA TEST test strip USE TO TEST BLOOD SUGAR THREE TIMES A DAY  1   • valsartan (DIOVAN) 160 MG tablet Take 40 mg by mouth Every Night. 1/2 tablet daily      • valsartan-hydrochlorothiazide (DIOVAN-HCT) 160-12.5 MG per tablet Take  by mouth.     • vitamin C (ASCORBIC ACID) 500 MG tablet Take 500 mg by mouth Every Night.     • vitamin E 200 UNIT capsule Take 200 Units by mouth Every Night.       No current facility-administered medications for this visit.            OBJECTIVE    Wt (!) 142 kg (312 lb)   BMI 39.00 kg/m²     Review of Systems   Constitutional: Negative.    HENT: Negative.    Respiratory: Negative.    Cardiovascular: Negative.    Musculoskeletal: Negative.    Skin: Positive for wound.   Neurological: Positive for numbness. Negative for dizziness.   Psychiatric/Behavioral: Negative.    All other  systems reviewed and are negative.          Constitutional: well developed, well nourished     HEENT: Normocephalic and atraumatic, normal hearing     Respiratory: Non labored respirations noted    Cardiovascular:    CFT brisk  to all digits  Skin temp is warm to warm from proximal tibia to distal digits  Pedal hair growth diminished.   No erythema or edema noted      Musculoskeletal:  TMA noted Right foot  Left hallux slightly abducted  Digits 2 through 5 left are rectus     Dermatological:   Skin is warm, dry and intact    Webspaces 1-4 bilateral are clean, dry and intact.   No subcutaneous nodules or masses noted    Wound noted to plantar lateral left foot measuring 1.7 x 1.1 x 0.2 cm.  Hyperkeratotic border.  Base is fibrous granular.  No signs of infection noted.     Neurological:   Protective sensation absent  Sensation intact to light touch        Psychiatric: A&O x 3 with normal mood and affect. NAD.       Procedures        ASSESSMENT AND PLAN    Emre was seen today for follow-up.    Diagnoses and all orders for this visit:    Diabetic ulcer of left midfoot associated with type 2 diabetes mellitus, with fat layer exposed (CMS/HCC)    Charcot's joint of left foot    Type 2 diabetes mellitus with peripheral neuropathy (CMS/HCC)      - Ulcer noted in objective was excisionally debrided with a dermal curet down to and including subcutaneous tissue.  Post-debridement measurements are 1.9 x 1.3 x 0.2 cm.  - Ulcer was dressed.  Patient to continue to do daily dressing changes at home.  - Emphasized the importance of more aggressive offloading modality like total contact casting.  Patient declined  - Continue ambulation and offloading surgical shoe  - All his questions were answered  - Return to clinic 1            This document has been electronically signed by Marco A Thompson DPM on December 19, 2018 5:24 PM

## 2018-12-26 ENCOUNTER — OFFICE VISIT (OUTPATIENT)
Dept: PODIATRY | Facility: CLINIC | Age: 58
End: 2018-12-26

## 2018-12-26 VITALS — WEIGHT: 312 LBS | OXYGEN SATURATION: 99 % | HEIGHT: 75 IN | HEART RATE: 90 BPM | BODY MASS INDEX: 38.79 KG/M2

## 2018-12-26 DIAGNOSIS — E11.42 TYPE 2 DIABETES MELLITUS WITH PERIPHERAL NEUROPATHY (HCC): ICD-10-CM

## 2018-12-26 DIAGNOSIS — L97.422 DIABETIC ULCER OF LEFT MIDFOOT ASSOCIATED WITH TYPE 2 DIABETES MELLITUS, WITH FAT LAYER EXPOSED (HCC): Primary | ICD-10-CM

## 2018-12-26 DIAGNOSIS — E11.621 DIABETIC ULCER OF LEFT MIDFOOT ASSOCIATED WITH TYPE 2 DIABETES MELLITUS, WITH FAT LAYER EXPOSED (HCC): Primary | ICD-10-CM

## 2018-12-26 DIAGNOSIS — M14.672 CHARCOT'S JOINT OF LEFT FOOT: ICD-10-CM

## 2018-12-26 PROCEDURE — 11042 DBRDMT SUBQ TIS 1ST 20SQCM/<: CPT | Performed by: PODIATRIST

## 2018-12-26 NOTE — PROGRESS NOTES
Emre Lisa  1960  58 y.o. male   [place] - 3/20/2018  BS - 102 per pt    Patient presents today for recheck of his left foot ulcer.    12/26/18  Chief Complaint   Patient presents with   • Left Foot - Follow-up, Foot Ulcer       History of Present Illness    Patient presents to clinic today follow-up of his left foot wound.      Past Medical History:   Diagnosis Date   • Arthritis    • Atrial fibrillation (CMS/HCC)    • Diabetes mellitus (CMS/HCC)    • Diabetic foot ulcer associated with type 2 diabetes mellitus (CMS/HCC)     left great toe   • Hallux malleus of left foot    • Hammer toe    • Hypertension    • MI (myocardial infarction) (CMS/HCC)    • Neuropathy in diabetes (CMS/HCC)    • Onychomycosis          Past Surgical History:   Procedure Laterality Date   • AMPUTATION DIGIT Right 3/5/2017    Procedure: RIGHT AMPUTATION TRANSMETATRASAL , RECESSION GASTROCNEMOUS;  Surgeon: Marco A Thompson DPM;  Location: [facility];  Service:    • CARDIAC DEFIBRILLATOR PLACEMENT  2010, 2016   • CARPAL TUNNEL RELEASE  2015    elbow and wrist left arm   • FOOT IRRIGATION, DEBRIDEMENT AND REPAIR Left 3/7/2018    Procedure: FOOT IRRIGATION, DEBRIDEMENT AND REPAIR;  Surgeon: Marco A Thompson DPM;  Location: [facility];  Service:    • FOOT IRRIGATION, DEBRIDEMENT AND REPAIR Left 3/10/2018    Procedure: FOOT IRRIGATION, DEBRIDEMENT AND REPAIR;  Surgeon: Marco A Thompson DPM;  Location: [facility];  Service: Podiatry   • FOOT SURGERY     • FOOT WOUND CLOSURE Right 3/2/2017    Procedure: INCISION AND DRAINAGE, RIGHT FOOT;  Surgeon: Tung Rosenthal DPM;  Location: [facility] OR;  Service:    • HAMMER TOE REPAIR Left 2/15/2018    Procedure: HAMMERTOE CORRECTION LEFT SECOND, THIRD AND FOURTH TOES AND HALLUX INTERPHALANGEAL JOINT ARTHRODESIS LEFT FOOT (Micro Asnis, 4.0 & 5.0 Asnis)      (c-arm);  Surgeon: Marco A Thompson DPM;  Location: [facility];  Service:    • HARDWARE REMOVAL Left 3/5/2018    Procedure: ANKLE/FOOT HARDWARE  REMOVAL;  Surgeon: Marco A Thompson DPM;  Location: [facility];  Service:    • INCISION AND DRAINAGE LEG Left 3/5/2018    Procedure: INCISION AND DRAINAGE LOWER EXTREMITY;  Surgeon: Marco A Thompson DPM;  Location: [facility];  Service:    • TOE AMPUTATION Right 2016   • TONSILECTOMY, ADENOIDECTOMY, BILATERAL MYRINGOTOMY AND TUBES      age 25         Family History   Problem Relation Age of Onset   • Diabetes Father    • Stroke Father    • No Known Problems Maternal Grandmother    • No Known Problems Maternal Grandfather    • No Known Problems Paternal Grandmother    • No Known Problems Paternal Grandfather    • No Known Problems Son    • No Known Problems Daughter    • No Known Problems Daughter          Social History     Socioeconomic History   • Marital status:      Spouse name: Not on file   • Number of children: Not on file   • Years of education: Not on file   • Highest education level: Not on file   Social Needs   • Financial resource strain: Not on file   • Food insecurity - worry: Not on file   • Food insecurity - inability: Not on file   • Transportation needs - medical: Not on file   • Transportation needs - non-medical: Not on file   Occupational History   • Not on file   Tobacco Use   • Smoking status: Never Smoker   • Smokeless tobacco: Never Used   Substance and Sexual Activity   • Alcohol use: No   • Drug use: No   • Sexual activity: Defer   Other Topics Concern   • Not on file   Social History Narrative   • Not on file         Current Outpatient Medications   Medication Sig Dispense Refill   • aspirin 81 MG chewable tablet Chew 81 mg Every Night.     • Bacitracin Zinc (MAGIC BUTT OINTMENT) Apply 1 each topically 2 (Two) Times a Day. 120 g 1   • Cholecalciferol (VITAMIN D3) 5000 UNITS capsule capsule Take 5,000 Units by mouth Every Night.     • Cyanocobalamin (VITAMIN B 12 PO) Take 1,000 mcg by mouth Every Night.     • doxycycline (VIBRAMYCIN) 100 MG capsule Take 1 capsule by mouth 2 (Two) Times  "a Day. 28 capsule 0   • dronedarone (MULTAQ) 400 MG tablet Take 400 mg by mouth Every Night.     • fluticasone (FLONASE) 50 MCG/ACT nasal spray 2 sprays into each nostril As Needed for Rhinitis.     • glimepiride (AMARYL) 2 MG tablet Take 4 mg by mouth 2 (Two) Times a Day.     • glimepiride (AMARYL) 2 MG tablet TAKE 1 TABLET BY MOUTH 2 (TWO) TIMES DAILY WITH FOOD     • HYDROcodone-acetaminophen (NORCO) 7.5-325 MG per tablet Take 1 tablet by mouth Every 6 (Six) Hours As Needed for Moderate Pain . 30 tablet 0   • losartan (COZAAR) 25 MG tablet      • magnesium oxide (MAGOX) 400 (241.3 Mg) MG tablet tablet Take 400 mg by mouth 2 (Two) Times a Day.     • metFORMIN (GLUCOPHAGE) 1000 MG tablet Take 1,000 mg by mouth 2 (Two) Times a Day With Meals. Patient states he only uses when needed     • metFORMIN (GLUCOPHAGE) 1000 MG tablet TAKE 1 TABLET BY MOUTH 2 (TWO) TIMES DAILY WITH MEALS. INDICATIONS: TYPE 2 DIABETES MELLITUS     • metoprolol succinate XL (TOPROL-XL) 25 MG 24 hr tablet      • metoprolol tartrate (LOPRESSOR) 25 MG tablet Take 12.5 mg by mouth Every Night.     • ONE TOUCH ULTRA TEST test strip USE TO TEST BLOOD SUGAR THREE TIMES A DAY  1   • valsartan (DIOVAN) 160 MG tablet Take 40 mg by mouth Every Night. 1/2 tablet daily      • valsartan-hydrochlorothiazide (DIOVAN-HCT) 160-12.5 MG per tablet Take  by mouth.     • vitamin C (ASCORBIC ACID) 500 MG tablet Take 500 mg by mouth Every Night.     • vitamin E 200 UNIT capsule Take 200 Units by mouth Every Night.       No current facility-administered medications for this visit.            OBJECTIVE    Pulse 90   Ht 190.5 cm (75\")   Wt (!) 142 kg (312 lb)   SpO2 99%   BMI 39.00 kg/m²     Review of Systems   Constitutional: Negative.    HENT: Negative.    Respiratory: Negative.    Cardiovascular: Negative.    Skin: Positive for wound.   Neurological: Positive for numbness. Negative for dizziness.   Psychiatric/Behavioral: Negative.    All other systems reviewed and " are negative.            Physical Exam   Constitutional: He is oriented to person, place, and time. He appears well-developed and well-nourished. No distress.   HENT:   Head: Normocephalic and atraumatic.   Nose: Nose normal.   Eyes: Conjunctivae and EOM are normal. Pupils are equal, round, and reactive to light.   Pulmonary/Chest: Effort normal. No respiratory distress. He has no wheezes.   Neurological: He is alert and oriented to person, place, and time. He displays normal reflexes.   Skin: Skin is warm and dry. Capillary refill takes less than 2 seconds.   Psychiatric: He has a normal mood and affect. His behavior is normal.   Vitals reviewed.    Lower Extremity Exam:     Cardiovascular:    CFT brisk  to all digits  Skin temp is warm to warm from proximal tibia to distal digits  Pedal hair growth diminished.   No erythema or edema noted      Musculoskeletal:  TMA noted Right foot  Left hallux slightly abducted  Digits 2 through 5 left are rectus     Dermatological:   No subcutaneous nodules or masses noted    Wound noted to plantar lateral left foot measuring 1.6 x 1.2 x 0.2 cm.  Base is fibrous and granular.  No signs of infection noted.     Neurological:   Protective sensation absent  Sensation intact to light touch         Procedures        ASSESSMENT AND PLAN    Emre was seen today for follow-up and foot ulcer.    Diagnoses and all orders for this visit:    Diabetic ulcer of left midfoot associated with type 2 diabetes mellitus, with fat layer exposed (CMS/HCC)    Charcot's joint of left foot    Type 2 diabetes mellitus with peripheral neuropathy (CMS/HCC)      - Ulcer noted in objective was selectively debrided with a dermal curet of slough and fibrotic tissue in wound bed.  Post-debridement measurements are 1.6 x 1.2 x 0.2 cm.  - Ulcer was dressed.  Patient to continue to do daily dressing changes at home.  - Reemphasized the importance and need of more aggressive offloading.  Patient declined again.  -  Continue ambulation and offloading surgical shoe  - All his questions were answered  - Return to clinic 1            This document has been electronically signed by Marco A Thompson DPM on December 26, 2018 12:02 PM

## 2018-12-31 ENCOUNTER — OFFICE VISIT (OUTPATIENT)
Dept: PODIATRY | Facility: CLINIC | Age: 58
End: 2018-12-31

## 2018-12-31 VITALS — HEART RATE: 96 BPM | BODY MASS INDEX: 17.66 KG/M2 | OXYGEN SATURATION: 99 % | HEIGHT: 75 IN | WEIGHT: 142 LBS

## 2018-12-31 DIAGNOSIS — L97.422 DIABETIC ULCER OF LEFT MIDFOOT ASSOCIATED WITH TYPE 2 DIABETES MELLITUS, WITH FAT LAYER EXPOSED (HCC): Primary | ICD-10-CM

## 2018-12-31 DIAGNOSIS — E11.42 TYPE 2 DIABETES MELLITUS WITH PERIPHERAL NEUROPATHY (HCC): ICD-10-CM

## 2018-12-31 DIAGNOSIS — E11.621 DIABETIC ULCER OF LEFT MIDFOOT ASSOCIATED WITH TYPE 2 DIABETES MELLITUS, WITH FAT LAYER EXPOSED (HCC): Primary | ICD-10-CM

## 2018-12-31 DIAGNOSIS — M14.672 CHARCOT'S JOINT OF LEFT FOOT: ICD-10-CM

## 2018-12-31 PROCEDURE — 29445 APPL RIGID TOT CNTC LEG CAST: CPT | Performed by: PODIATRIST

## 2018-12-31 NOTE — PROGRESS NOTES
Emre Lisa  1960  58 y.o. male   [place] - 3/20/2018  BS - 102 per pt    Patient presents today for recheck of his left foot ulcer.    12/31/18  Chief Complaint   Patient presents with   • Left Foot - Follow-up       History of Present Illness    Patient presents to clinic today follow-up of his left foot wound.  He is ambulating in an offloaded surgical shoe.     Past Medical History:   Diagnosis Date   • Arthritis    • Atrial fibrillation (CMS/HCC)    • Diabetes mellitus (CMS/HCC)    • Diabetic foot ulcer associated with type 2 diabetes mellitus (CMS/HCC)     left great toe   • Hallux malleus of left foot    • Hammer toe    • Hypertension    • MI (myocardial infarction) (CMS/HCC)    • Neuropathy in diabetes (CMS/HCC)    • Onychomycosis          Past Surgical History:   Procedure Laterality Date   • AMPUTATION DIGIT Right 3/5/2017    Procedure: RIGHT AMPUTATION TRANSMETATRASAL , RECESSION GASTROCNEMOUS;  Surgeon: Marco A Thompson DPM;  Location: [facility] OR;  Service:    • CARDIAC DEFIBRILLATOR PLACEMENT  2010, 2016   • CARPAL TUNNEL RELEASE  2015    elbow and wrist left arm   • FOOT IRRIGATION, DEBRIDEMENT AND REPAIR Left 3/7/2018    Procedure: FOOT IRRIGATION, DEBRIDEMENT AND REPAIR;  Surgeon: Marco A Thompson DPM;  Location: [facility] OR;  Service:    • FOOT IRRIGATION, DEBRIDEMENT AND REPAIR Left 3/10/2018    Procedure: FOOT IRRIGATION, DEBRIDEMENT AND REPAIR;  Surgeon: Marco A Thompson DPM;  Location: [facility] OR;  Service: Podiatry   • FOOT SURGERY     • FOOT WOUND CLOSURE Right 3/2/2017    Procedure: INCISION AND DRAINAGE, RIGHT FOOT;  Surgeon: Tung Rosenthal DPM;  Location: [facility] OR;  Service:    • HAMMER TOE REPAIR Left 2/15/2018    Procedure: HAMMERTOE CORRECTION LEFT SECOND, THIRD AND FOURTH TOES AND HALLUX INTERPHALANGEAL JOINT ARTHRODESIS LEFT FOOT (Micro Asnis, 4.0 & 5.0 Asnis)      (c-arm);  Surgeon: Marco A Thompson DPM;  Location: [facility] OR;  Service:    • HARDWARE REMOVAL Left 3/5/2018     Procedure: ANKLE/FOOT HARDWARE REMOVAL;  Surgeon: Marco A Thompson DPM;  Location: [facility];  Service:    • INCISION AND DRAINAGE LEG Left 3/5/2018    Procedure: INCISION AND DRAINAGE LOWER EXTREMITY;  Surgeon: Marco A Thompson DPM;  Location: [facility];  Service:    • TOE AMPUTATION Right 2016   • TONSILECTOMY, ADENOIDECTOMY, BILATERAL MYRINGOTOMY AND TUBES      age 25         Family History   Problem Relation Age of Onset   • Diabetes Father    • Stroke Father    • No Known Problems Maternal Grandmother    • No Known Problems Maternal Grandfather    • No Known Problems Paternal Grandmother    • No Known Problems Paternal Grandfather    • No Known Problems Son    • No Known Problems Daughter    • No Known Problems Daughter          Social History     Socioeconomic History   • Marital status:      Spouse name: Not on file   • Number of children: Not on file   • Years of education: Not on file   • Highest education level: Not on file   Social Needs   • Financial resource strain: Not on file   • Food insecurity - worry: Not on file   • Food insecurity - inability: Not on file   • Transportation needs - medical: Not on file   • Transportation needs - non-medical: Not on file   Occupational History   • Not on file   Tobacco Use   • Smoking status: Never Smoker   • Smokeless tobacco: Never Used   Substance and Sexual Activity   • Alcohol use: No   • Drug use: No   • Sexual activity: Defer   Other Topics Concern   • Not on file   Social History Narrative   • Not on file         Current Outpatient Medications   Medication Sig Dispense Refill   • aspirin 81 MG chewable tablet Chew 81 mg Every Night.     • Bacitracin Zinc (MAGIC BUTT OINTMENT) Apply 1 each topically 2 (Two) Times a Day. 120 g 1   • Cholecalciferol (VITAMIN D3) 5000 UNITS capsule capsule Take 5,000 Units by mouth Every Night.     • Cyanocobalamin (VITAMIN B 12 PO) Take 1,000 mcg by mouth Every Night.     • doxycycline (VIBRAMYCIN) 100 MG capsule Take 1  "capsule by mouth 2 (Two) Times a Day. 28 capsule 0   • dronedarone (MULTAQ) 400 MG tablet Take 400 mg by mouth Every Night.     • fluticasone (FLONASE) 50 MCG/ACT nasal spray 2 sprays into each nostril As Needed for Rhinitis.     • glimepiride (AMARYL) 2 MG tablet Take 4 mg by mouth 2 (Two) Times a Day.     • glimepiride (AMARYL) 2 MG tablet TAKE 1 TABLET BY MOUTH 2 (TWO) TIMES DAILY WITH FOOD     • HYDROcodone-acetaminophen (NORCO) 7.5-325 MG per tablet Take 1 tablet by mouth Every 6 (Six) Hours As Needed for Moderate Pain . 30 tablet 0   • losartan (COZAAR) 25 MG tablet      • magnesium oxide (MAGOX) 400 (241.3 Mg) MG tablet tablet Take 400 mg by mouth 2 (Two) Times a Day.     • metFORMIN (GLUCOPHAGE) 1000 MG tablet Take 1,000 mg by mouth 2 (Two) Times a Day With Meals. Patient states he only uses when needed     • metFORMIN (GLUCOPHAGE) 1000 MG tablet TAKE 1 TABLET BY MOUTH 2 (TWO) TIMES DAILY WITH MEALS. INDICATIONS: TYPE 2 DIABETES MELLITUS     • metoprolol succinate XL (TOPROL-XL) 25 MG 24 hr tablet      • metoprolol tartrate (LOPRESSOR) 25 MG tablet Take 12.5 mg by mouth Every Night.     • ONE TOUCH ULTRA TEST test strip USE TO TEST BLOOD SUGAR THREE TIMES A DAY  1   • valsartan (DIOVAN) 160 MG tablet Take 40 mg by mouth Every Night. 1/2 tablet daily      • valsartan-hydrochlorothiazide (DIOVAN-HCT) 160-12.5 MG per tablet Take  by mouth.     • vitamin C (ASCORBIC ACID) 500 MG tablet Take 500 mg by mouth Every Night.     • vitamin E 200 UNIT capsule Take 200 Units by mouth Every Night.       No current facility-administered medications for this visit.            OBJECTIVE    Pulse 96   Ht 190.5 cm (75\")   Wt 64.4 kg (142 lb)   SpO2 99%   BMI 17.75 kg/m²     Review of Systems   Constitutional: Negative.    HENT: Negative.    Respiratory: Negative.    Cardiovascular: Negative.    Musculoskeletal: Negative.    Skin: Positive for wound.   Neurological: Positive for numbness. Negative for dizziness. "   Psychiatric/Behavioral: Negative.              Physical Exam   Constitutional: He is oriented to person, place, and time. He appears well-developed and well-nourished. No distress.   HENT:   Head: Normocephalic and atraumatic.   Nose: Nose normal.   Pulmonary/Chest: Effort normal. No respiratory distress. He has no wheezes.   Neurological: He is alert and oriented to person, place, and time. He displays normal reflexes.   Psychiatric: He has a normal mood and affect. His behavior is normal.   Vitals reviewed.    Lower Extremity Exam:     Cardiovascular:    CFT brisk  to all digits  Skin temp is warm to warm from proximal tibia to distal digits  Pedal hair growth diminished.   No erythema or edema noted      Musculoskeletal:  TMA noted Right foot  Left hallux slightly abducted  Digits 2 through 5 left are rectus     Dermatological:   No subcutaneous nodules or masses noted    Wound noted to plantar lateral left foot measuring 1.8 x 1.4 x 0.2 cm.  Base is fibrous and granular. Macerated surrounding tissue.  No signs of infection noted.     Neurological:   Protective sensation absent  Sensation intact to light touch         Procedures        ASSESSMENT AND PLAN    Emre was seen today for follow-up.    Diagnoses and all orders for this visit:    Diabetic ulcer of left midfoot associated with type 2 diabetes mellitus, with fat layer exposed (CMS/HCC)    Charcot's joint of left foot    Type 2 diabetes mellitus with peripheral neuropathy (CMS/HCC)      - Ulcer continues to increase in size.  - Patient agrees to total contact casting today.  - Total contact cast applied to left lower extremity.  - All his questions were answered  - Return to clinic 1            This document has been electronically signed by Marco A Thompson DPM on December 31, 2018 8:37 AM

## 2019-01-08 ENCOUNTER — OFFICE VISIT (OUTPATIENT)
Dept: PODIATRY | Facility: CLINIC | Age: 59
End: 2019-01-08

## 2019-01-08 VITALS — HEIGHT: 75 IN | BODY MASS INDEX: 38.79 KG/M2 | HEART RATE: 94 BPM | WEIGHT: 312 LBS | OXYGEN SATURATION: 98 %

## 2019-01-08 DIAGNOSIS — M14.672 CHARCOT'S JOINT OF LEFT FOOT: ICD-10-CM

## 2019-01-08 DIAGNOSIS — E11.42 TYPE 2 DIABETES MELLITUS WITH PERIPHERAL NEUROPATHY (HCC): ICD-10-CM

## 2019-01-08 DIAGNOSIS — E11.621 DIABETIC ULCER OF LEFT MIDFOOT ASSOCIATED WITH TYPE 2 DIABETES MELLITUS, WITH FAT LAYER EXPOSED (HCC): Primary | ICD-10-CM

## 2019-01-08 DIAGNOSIS — L97.422 DIABETIC ULCER OF LEFT MIDFOOT ASSOCIATED WITH TYPE 2 DIABETES MELLITUS, WITH FAT LAYER EXPOSED (HCC): Primary | ICD-10-CM

## 2019-01-08 PROCEDURE — 29445 APPL RIGID TOT CNTC LEG CAST: CPT | Performed by: PODIATRIST

## 2019-01-08 NOTE — PROGRESS NOTES
Emre Lisa  1960  58 y.o. male   [place] - 3/20/2018  BS - 102 per pt    Patient presents today for recheck of his left foot ulcer.    01/08/19  Chief Complaint   Patient presents with   • Left Foot - Follow-up, Foot Ulcer       History of Present Illness    Patient presents to clinic today follow-up of his left foot wound.  He is ambulating in a TCC.     Past Medical History:   Diagnosis Date   • Arthritis    • Atrial fibrillation (CMS/HCC)    • Diabetes mellitus (CMS/HCC)    • Diabetic foot ulcer associated with type 2 diabetes mellitus (CMS/HCC)     left great toe   • Hallux malleus of left foot    • Hammer toe    • Hypertension    • MI (myocardial infarction) (CMS/HCC)    • Neuropathy in diabetes (CMS/HCC)    • Onychomycosis          Past Surgical History:   Procedure Laterality Date   • AMPUTATION DIGIT Right 3/5/2017    Procedure: RIGHT AMPUTATION TRANSMETATRASAL , RECESSION GASTROCNEMOUS;  Surgeon: Marco A Thompson DPM;  Location: [facility];  Service:    • CARDIAC DEFIBRILLATOR PLACEMENT  2010, 2016   • CARPAL TUNNEL RELEASE  2015    elbow and wrist left arm   • FOOT IRRIGATION, DEBRIDEMENT AND REPAIR Left 3/7/2018    Procedure: FOOT IRRIGATION, DEBRIDEMENT AND REPAIR;  Surgeon: Marco A Thompson DPM;  Location: [facility] OR;  Service:    • FOOT IRRIGATION, DEBRIDEMENT AND REPAIR Left 3/10/2018    Procedure: FOOT IRRIGATION, DEBRIDEMENT AND REPAIR;  Surgeon: Marco A Thompson DPM;  Location: [facility];  Service: Podiatry   • FOOT SURGERY     • FOOT WOUND CLOSURE Right 3/2/2017    Procedure: INCISION AND DRAINAGE, RIGHT FOOT;  Surgeon: Tung Rosenthal DPM;  Location: [facility] OR;  Service:    • HAMMER TOE REPAIR Left 2/15/2018    Procedure: HAMMERTOE CORRECTION LEFT SECOND, THIRD AND FOURTH TOES AND HALLUX INTERPHALANGEAL JOINT ARTHRODESIS LEFT FOOT (Micro Asnis, 4.0 & 5.0 Asnis)      (c-arm);  Surgeon: Marco A Thompson DPM;  Location: [facility] OR;  Service:    • HARDWARE REMOVAL Left 3/5/2018    Procedure:  ANKLE/FOOT HARDWARE REMOVAL;  Surgeon: Marco A Thompson DPM;  Location: [facility];  Service:    • INCISION AND DRAINAGE LEG Left 3/5/2018    Procedure: INCISION AND DRAINAGE LOWER EXTREMITY;  Surgeon: Marco A Thompson DPM;  Location: [facility];  Service:    • TOE AMPUTATION Right 2016   • TONSILECTOMY, ADENOIDECTOMY, BILATERAL MYRINGOTOMY AND TUBES      age 25         Family History   Problem Relation Age of Onset   • Diabetes Father    • Stroke Father    • No Known Problems Maternal Grandmother    • No Known Problems Maternal Grandfather    • No Known Problems Paternal Grandmother    • No Known Problems Paternal Grandfather    • No Known Problems Son    • No Known Problems Daughter    • No Known Problems Daughter          Social History     Socioeconomic History   • Marital status:      Spouse name: Not on file   • Number of children: Not on file   • Years of education: Not on file   • Highest education level: Not on file   Social Needs   • Financial resource strain: Not on file   • Food insecurity - worry: Not on file   • Food insecurity - inability: Not on file   • Transportation needs - medical: Not on file   • Transportation needs - non-medical: Not on file   Occupational History   • Not on file   Tobacco Use   • Smoking status: Never Smoker   • Smokeless tobacco: Never Used   Substance and Sexual Activity   • Alcohol use: No   • Drug use: No   • Sexual activity: Defer   Other Topics Concern   • Not on file   Social History Narrative   • Not on file         Current Outpatient Medications   Medication Sig Dispense Refill   • aspirin 81 MG chewable tablet Chew 81 mg Every Night.     • Bacitracin Zinc (MAGIC BUTT OINTMENT) Apply 1 each topically 2 (Two) Times a Day. 120 g 1   • Cholecalciferol (VITAMIN D3) 5000 UNITS capsule capsule Take 5,000 Units by mouth Every Night.     • Cyanocobalamin (VITAMIN B 12 PO) Take 1,000 mcg by mouth Every Night.     • doxycycline (VIBRAMYCIN) 100 MG capsule Take 1 capsule by  "mouth 2 (Two) Times a Day. 28 capsule 0   • dronedarone (MULTAQ) 400 MG tablet Take 400 mg by mouth Every Night.     • fluticasone (FLONASE) 50 MCG/ACT nasal spray 2 sprays into each nostril As Needed for Rhinitis.     • glimepiride (AMARYL) 2 MG tablet Take 4 mg by mouth 2 (Two) Times a Day.     • glimepiride (AMARYL) 2 MG tablet TAKE 1 TABLET BY MOUTH 2 (TWO) TIMES DAILY WITH FOOD     • HYDROcodone-acetaminophen (NORCO) 7.5-325 MG per tablet Take 1 tablet by mouth Every 6 (Six) Hours As Needed for Moderate Pain . 30 tablet 0   • losartan (COZAAR) 25 MG tablet      • magnesium oxide (MAGOX) 400 (241.3 Mg) MG tablet tablet Take 400 mg by mouth 2 (Two) Times a Day.     • metFORMIN (GLUCOPHAGE) 1000 MG tablet Take 1,000 mg by mouth 2 (Two) Times a Day With Meals. Patient states he only uses when needed     • metFORMIN (GLUCOPHAGE) 1000 MG tablet TAKE 1 TABLET BY MOUTH 2 (TWO) TIMES DAILY WITH MEALS. INDICATIONS: TYPE 2 DIABETES MELLITUS     • metoprolol succinate XL (TOPROL-XL) 25 MG 24 hr tablet      • metoprolol tartrate (LOPRESSOR) 25 MG tablet Take 12.5 mg by mouth Every Night.     • ONE TOUCH ULTRA TEST test strip USE TO TEST BLOOD SUGAR THREE TIMES A DAY  1   • valsartan (DIOVAN) 160 MG tablet Take 40 mg by mouth Every Night. 1/2 tablet daily      • valsartan-hydrochlorothiazide (DIOVAN-HCT) 160-12.5 MG per tablet Take  by mouth.     • vitamin C (ASCORBIC ACID) 500 MG tablet Take 500 mg by mouth Every Night.     • vitamin E 200 UNIT capsule Take 200 Units by mouth Every Night.       No current facility-administered medications for this visit.            OBJECTIVE    Pulse 94   Ht 190.5 cm (75\")   Wt (!) 142 kg (312 lb)   SpO2 98%   BMI 39.00 kg/m²     Review of Systems   Constitutional: Negative.    HENT: Negative.    Eyes: Negative.    Respiratory: Negative.    Cardiovascular: Negative.    Musculoskeletal: Negative.    Skin: Positive for wound.   Neurological: Positive for numbness. "   Psychiatric/Behavioral: Negative.              Physical Exam   Constitutional: He is oriented to person, place, and time. He appears well-developed and well-nourished. No distress.   HENT:   Head: Normocephalic and atraumatic.   Nose: Nose normal.   Pulmonary/Chest: Effort normal. No respiratory distress. He has no wheezes.   Neurological: He is alert and oriented to person, place, and time. He displays normal reflexes.   Psychiatric: He has a normal mood and affect. His behavior is normal.   Vitals reviewed.    Lower Extremity Exam:     Cardiovascular:    CFT brisk  to all digits  Skin temp is warm to warm from proximal tibia to distal digits  Pedal hair growth diminished.   No erythema or edema noted      Musculoskeletal:  TMA noted Right foot  Left hallux slightly abducted  Digits 2 through 5 left are rectus     Dermatological:   No subcutaneous nodules or masses noted    Wound noted to plantar lateral left foot measuring 1.2 x 0.9 x 0.2 cm.  Base is primarily granular.  No signs of infection noted.     Neurological:   Protective sensation absent  Sensation intact to light touch         Procedures        ASSESSMENT AND PLAN    Emre was seen today for follow-up and foot ulcer.    Diagnoses and all orders for this visit:    Diabetic ulcer of left midfoot associated with type 2 diabetes mellitus, with fat layer exposed (CMS/HCC)    Charcot's joint of left foot    Type 2 diabetes mellitus with peripheral neuropathy (CMS/HCC)      - Ulcer has decreased in size  - Total contact cast re-applied to left lower extremity.  - All his questions were answered  - Return to clinic 1 week             This document has been electronically signed by Marco A Thompson DPM on January 8, 2019 7:48 PM

## 2019-01-14 ENCOUNTER — OFFICE VISIT (OUTPATIENT)
Dept: PODIATRY | Facility: CLINIC | Age: 59
End: 2019-01-14

## 2019-01-14 VITALS — OXYGEN SATURATION: 98 % | HEART RATE: 98 BPM | HEIGHT: 75 IN | BODY MASS INDEX: 38.79 KG/M2 | WEIGHT: 312 LBS

## 2019-01-14 DIAGNOSIS — E11.42 TYPE 2 DIABETES MELLITUS WITH PERIPHERAL NEUROPATHY (HCC): ICD-10-CM

## 2019-01-14 DIAGNOSIS — M14.672 CHARCOT'S JOINT OF LEFT FOOT: ICD-10-CM

## 2019-01-14 DIAGNOSIS — E11.621 DIABETIC ULCER OF LEFT MIDFOOT ASSOCIATED WITH TYPE 2 DIABETES MELLITUS, WITH FAT LAYER EXPOSED (HCC): Primary | ICD-10-CM

## 2019-01-14 DIAGNOSIS — L97.422 DIABETIC ULCER OF LEFT MIDFOOT ASSOCIATED WITH TYPE 2 DIABETES MELLITUS, WITH FAT LAYER EXPOSED (HCC): Primary | ICD-10-CM

## 2019-01-14 PROCEDURE — 29445 APPL RIGID TOT CNTC LEG CAST: CPT | Performed by: PODIATRIST

## 2019-01-14 RX ORDER — CEFDINIR 300 MG/1
CAPSULE ORAL
COMMUNITY
Start: 2019-01-13 | End: 2019-11-11

## 2019-01-14 NOTE — PROGRESS NOTES
Emre Lisa  1960  58 y.o. male   [place] - 3/20/2018  BS - 102 per pt    Patient presents today for recheck of his left foot ulcer.    01/14/19  Chief Complaint   Patient presents with   • Left Foot - Follow-up, Foot Ulcer, TCC change       History of Present Illness    Patient presents to clinic today follow-up of his left foot wound.  He is ambulating in a TCC.  He denies pain.     Past Medical History:   Diagnosis Date   • Arthritis    • Atrial fibrillation (CMS/HCC)    • Diabetes mellitus (CMS/HCC)    • Diabetic foot ulcer associated with type 2 diabetes mellitus (CMS/HCC)     left great toe   • Hallux malleus of left foot    • Hammer toe    • Hypertension    • MI (myocardial infarction) (CMS/HCC)    • Neuropathy in diabetes (CMS/HCC)    • Onychomycosis          Past Surgical History:   Procedure Laterality Date   • AMPUTATION DIGIT Right 3/5/2017    Procedure: RIGHT AMPUTATION TRANSMETATRASAL , RECESSION GASTROCNEMOUS;  Surgeon: Marco A Thompson DPM;  Location: [facility];  Service:    • CARDIAC DEFIBRILLATOR PLACEMENT  2010, 2016   • CARPAL TUNNEL RELEASE  2015    elbow and wrist left arm   • FOOT IRRIGATION, DEBRIDEMENT AND REPAIR Left 3/7/2018    Procedure: FOOT IRRIGATION, DEBRIDEMENT AND REPAIR;  Surgeon: Marco A Thompson DPM;  Location: [facility];  Service:    • FOOT IRRIGATION, DEBRIDEMENT AND REPAIR Left 3/10/2018    Procedure: FOOT IRRIGATION, DEBRIDEMENT AND REPAIR;  Surgeon: Marco A Thompson DPM;  Location: [facility];  Service: Podiatry   • FOOT SURGERY     • FOOT WOUND CLOSURE Right 3/2/2017    Procedure: INCISION AND DRAINAGE, RIGHT FOOT;  Surgeon: Tung Rosenthal DPM;  Location: [facility] OR;  Service:    • HAMMER TOE REPAIR Left 2/15/2018    Procedure: HAMMERTOE CORRECTION LEFT SECOND, THIRD AND FOURTH TOES AND HALLUX INTERPHALANGEAL JOINT ARTHRODESIS LEFT FOOT (Micro Asnis, 4.0 & 5.0 Asnis)      (c-arm);  Surgeon: Marco A Thompson DPM;  Location: [facility];  Service:    • HARDWARE  REMOVAL Left 3/5/2018    Procedure: ANKLE/FOOT HARDWARE REMOVAL;  Surgeon: Marco A Thompson DPM;  Location: [facility] OR;  Service:    • INCISION AND DRAINAGE LEG Left 3/5/2018    Procedure: INCISION AND DRAINAGE LOWER EXTREMITY;  Surgeon: Marco A Thompson DPM;  Location: [facility];  Service:    • TOE AMPUTATION Right 2016   • TONSILECTOMY, ADENOIDECTOMY, BILATERAL MYRINGOTOMY AND TUBES      age 25         Family History   Problem Relation Age of Onset   • Diabetes Father    • Stroke Father    • No Known Problems Maternal Grandmother    • No Known Problems Maternal Grandfather    • No Known Problems Paternal Grandmother    • No Known Problems Paternal Grandfather    • No Known Problems Son    • No Known Problems Daughter    • No Known Problems Daughter          Social History     Socioeconomic History   • Marital status:      Spouse name: Not on file   • Number of children: Not on file   • Years of education: Not on file   • Highest education level: Not on file   Social Needs   • Financial resource strain: Not on file   • Food insecurity - worry: Not on file   • Food insecurity - inability: Not on file   • Transportation needs - medical: Not on file   • Transportation needs - non-medical: Not on file   Occupational History   • Not on file   Tobacco Use   • Smoking status: Never Smoker   • Smokeless tobacco: Never Used   Substance and Sexual Activity   • Alcohol use: No   • Drug use: No   • Sexual activity: Defer   Other Topics Concern   • Not on file   Social History Narrative   • Not on file         Current Outpatient Medications   Medication Sig Dispense Refill   • aspirin 81 MG chewable tablet Chew 81 mg Every Night.     • Bacitracin Zinc (MAGIC BUTT OINTMENT) Apply 1 each topically 2 (Two) Times a Day. 120 g 1   • cefdinir (OMNICEF) 300 MG capsule      • Cholecalciferol (VITAMIN D3) 5000 UNITS capsule capsule Take 5,000 Units by mouth Every Night.     • CLONIDINE HCL 5 MCG/ML ORAL SOLN Take  by mouth.     •  "Cyanocobalamin (VITAMIN B 12 PO) Take 1,000 mcg by mouth Every Night.     • doxycycline (VIBRAMYCIN) 100 MG capsule Take 1 capsule by mouth 2 (Two) Times a Day. 28 capsule 0   • dronedarone (MULTAQ) 400 MG tablet Take 400 mg by mouth Every Night.     • fluticasone (FLONASE) 50 MCG/ACT nasal spray 2 sprays into each nostril As Needed for Rhinitis.     • glimepiride (AMARYL) 2 MG tablet Take 4 mg by mouth 2 (Two) Times a Day.     • glimepiride (AMARYL) 2 MG tablet TAKE 1 TABLET BY MOUTH 2 (TWO) TIMES DAILY WITH FOOD     • HYDROcodone-acetaminophen (NORCO) 7.5-325 MG per tablet Take 1 tablet by mouth Every 6 (Six) Hours As Needed for Moderate Pain . 30 tablet 0   • losartan (COZAAR) 25 MG tablet      • magnesium oxide (MAGOX) 400 (241.3 Mg) MG tablet tablet Take 400 mg by mouth 2 (Two) Times a Day.     • metFORMIN (GLUCOPHAGE) 1000 MG tablet Take 1,000 mg by mouth 2 (Two) Times a Day With Meals. Patient states he only uses when needed     • metFORMIN (GLUCOPHAGE) 1000 MG tablet TAKE 1 TABLET BY MOUTH 2 (TWO) TIMES DAILY WITH MEALS. INDICATIONS: TYPE 2 DIABETES MELLITUS     • metoprolol succinate XL (TOPROL-XL) 25 MG 24 hr tablet      • metoprolol tartrate (LOPRESSOR) 25 MG tablet Take 12.5 mg by mouth Every Night.     • ONE TOUCH ULTRA TEST test strip USE TO TEST BLOOD SUGAR THREE TIMES A DAY  1   • valsartan (DIOVAN) 160 MG tablet Take 40 mg by mouth Every Night. 1/2 tablet daily      • valsartan-hydrochlorothiazide (DIOVAN-HCT) 160-12.5 MG per tablet Take  by mouth.     • vitamin C (ASCORBIC ACID) 500 MG tablet Take 500 mg by mouth Every Night.     • vitamin E 200 UNIT capsule Take 200 Units by mouth Every Night.       No current facility-administered medications for this visit.            OBJECTIVE    Pulse 98   Ht 190.5 cm (75\")   Wt (!) 142 kg (312 lb)   SpO2 98%   BMI 39.00 kg/m²     Review of Systems   Constitutional: Negative.    HENT: Negative.    Eyes: Negative.    Respiratory: Negative.  "   Cardiovascular: Negative.    Musculoskeletal: Negative.    Skin: Positive for wound.   Neurological: Positive for numbness. Negative for dizziness.   Psychiatric/Behavioral: Negative.              Physical Exam   Constitutional: He is oriented to person, place, and time. He appears well-developed and well-nourished. No distress.   HENT:   Head: Normocephalic and atraumatic.   Nose: Nose normal.   Eyes: Conjunctivae and EOM are normal. Pupils are equal, round, and reactive to light.   Pulmonary/Chest: Effort normal. No respiratory distress. He has no wheezes.   Neurological: He is alert and oriented to person, place, and time. He displays normal reflexes.   Psychiatric: He has a normal mood and affect. His behavior is normal.   Vitals reviewed.    Lower Extremity Exam:     Cardiovascular:    CFT brisk  to all digits  Skin temp is warm to warm from proximal tibia to distal digits  Pedal hair growth diminished.   No erythema or edema noted      Musculoskeletal:  TMA noted Right foot  Left hallux slightly abducted  Digits 2 through 5 left are rectus     Dermatological:   No subcutaneous nodules or masses noted    Wound noted to plantar lateral left foot measuring 0.9 x 0.7 x 0.1 cm.  Base is primarily granular.  No signs of infection noted.     Neurological:   Protective sensation absent  Sensation intact to light touch         Procedures        ASSESSMENT AND PLAN    Emre was seen today for follow-up, foot ulcer and tcc change.    Diagnoses and all orders for this visit:    Diabetic ulcer of left midfoot associated with type 2 diabetes mellitus, with fat layer exposed (CMS/HCC)    Charcot's joint of left foot    Type 2 diabetes mellitus with peripheral neuropathy (CMS/HCC)      - Ulcer continues to reduce in size  - Total contact cast re-applied to left lower extremity.  - All his questions were answered  - Return to clinic 1 week             This document has been electronically signed by Marco A Thompson DPM on  January 14, 2019 8:51 AM

## 2019-01-21 ENCOUNTER — OFFICE VISIT (OUTPATIENT)
Dept: PODIATRY | Facility: CLINIC | Age: 59
End: 2019-01-21

## 2019-01-21 VITALS — HEIGHT: 75 IN | WEIGHT: 312 LBS | BODY MASS INDEX: 38.79 KG/M2

## 2019-01-21 DIAGNOSIS — L97.422 DIABETIC ULCER OF LEFT MIDFOOT ASSOCIATED WITH TYPE 2 DIABETES MELLITUS, WITH FAT LAYER EXPOSED (HCC): Primary | ICD-10-CM

## 2019-01-21 DIAGNOSIS — E11.621 DIABETIC ULCER OF LEFT MIDFOOT ASSOCIATED WITH TYPE 2 DIABETES MELLITUS, WITH FAT LAYER EXPOSED (HCC): Primary | ICD-10-CM

## 2019-01-21 DIAGNOSIS — M14.672 CHARCOT'S JOINT OF LEFT FOOT: ICD-10-CM

## 2019-01-21 PROCEDURE — 29445 APPL RIGID TOT CNTC LEG CAST: CPT | Performed by: PODIATRIST

## 2019-01-21 NOTE — PROGRESS NOTES
Emre Lisa  1960  58 y.o. male   [place] - 3/20/2018  BS - 102 per pt    Patient presents today for recheck of his left foot ulcer.    01/21/19  Chief Complaint   Patient presents with   • Left Foot - Follow-up, Foot Ulcer, TCC change       History of Present Illness    Patient presents to clinic today follow-up of his left foot wound.      Past Medical History:   Diagnosis Date   • Arthritis    • Atrial fibrillation (CMS/HCC)    • Diabetes mellitus (CMS/HCC)    • Diabetic foot ulcer associated with type 2 diabetes mellitus (CMS/HCC)     left great toe   • Hallux malleus of left foot    • Hammer toe    • Hypertension    • MI (myocardial infarction) (CMS/HCC)    • Neuropathy in diabetes (CMS/HCC)    • Onychomycosis          Past Surgical History:   Procedure Laterality Date   • AMPUTATION DIGIT Right 3/5/2017    Procedure: RIGHT AMPUTATION TRANSMETATRASAL , RECESSION GASTROCNEMOUS;  Surgeon: Marco A Thompson DPM;  Location: [facility];  Service:    • CARDIAC DEFIBRILLATOR PLACEMENT  2010, 2016   • CARPAL TUNNEL RELEASE  2015    elbow and wrist left arm   • FOOT IRRIGATION, DEBRIDEMENT AND REPAIR Left 3/7/2018    Procedure: FOOT IRRIGATION, DEBRIDEMENT AND REPAIR;  Surgeon: Marco A Thompson DPM;  Location: [facility];  Service:    • FOOT IRRIGATION, DEBRIDEMENT AND REPAIR Left 3/10/2018    Procedure: FOOT IRRIGATION, DEBRIDEMENT AND REPAIR;  Surgeon: Marco A Thompson DPM;  Location: [facility];  Service: Podiatry   • FOOT SURGERY     • FOOT WOUND CLOSURE Right 3/2/2017    Procedure: INCISION AND DRAINAGE, RIGHT FOOT;  Surgeon: Tung Rosenthal DPM;  Location: [facility];  Service:    • HAMMER TOE REPAIR Left 2/15/2018    Procedure: HAMMERTOE CORRECTION LEFT SECOND, THIRD AND FOURTH TOES AND HALLUX INTERPHALANGEAL JOINT ARTHRODESIS LEFT FOOT (Micro Asnis, 4.0 & 5.0 Asnis)      (c-arm);  Surgeon: Marco A Thompson DPM;  Location: [facility];  Service:    • HARDWARE REMOVAL Left 3/5/2018    Procedure: ANKLE/FOOT  HARDWARE REMOVAL;  Surgeon: Marco A Thompson DPM;  Location: [facility];  Service:    • INCISION AND DRAINAGE LEG Left 3/5/2018    Procedure: INCISION AND DRAINAGE LOWER EXTREMITY;  Surgeon: Marco A Thompson DPM;  Location: [facility];  Service:    • TOE AMPUTATION Right 2016   • TONSILECTOMY, ADENOIDECTOMY, BILATERAL MYRINGOTOMY AND TUBES      age 25         Family History   Problem Relation Age of Onset   • Diabetes Father    • Stroke Father    • No Known Problems Maternal Grandmother    • No Known Problems Maternal Grandfather    • No Known Problems Paternal Grandmother    • No Known Problems Paternal Grandfather    • No Known Problems Son    • No Known Problems Daughter    • No Known Problems Daughter          Social History     Socioeconomic History   • Marital status:      Spouse name: Not on file   • Number of children: Not on file   • Years of education: Not on file   • Highest education level: Not on file   Social Needs   • Financial resource strain: Not on file   • Food insecurity - worry: Not on file   • Food insecurity - inability: Not on file   • Transportation needs - medical: Not on file   • Transportation needs - non-medical: Not on file   Occupational History   • Not on file   Tobacco Use   • Smoking status: Never Smoker   • Smokeless tobacco: Never Used   Substance and Sexual Activity   • Alcohol use: No   • Drug use: No   • Sexual activity: Defer   Other Topics Concern   • Not on file   Social History Narrative   • Not on file         Current Outpatient Medications   Medication Sig Dispense Refill   • aspirin 81 MG chewable tablet Chew 81 mg Every Night.     • Bacitracin Zinc (MAGIC BUTT OINTMENT) Apply 1 each topically 2 (Two) Times a Day. 120 g 1   • cefdinir (OMNICEF) 300 MG capsule      • Cholecalciferol (VITAMIN D3) 5000 UNITS capsule capsule Take 5,000 Units by mouth Every Night.     • CLONIDINE HCL 5 MCG/ML ORAL SOLN Take  by mouth.     • Cyanocobalamin (VITAMIN B 12 PO) Take 1,000 mcg  "by mouth Every Night.     • doxycycline (VIBRAMYCIN) 100 MG capsule Take 1 capsule by mouth 2 (Two) Times a Day. 28 capsule 0   • dronedarone (MULTAQ) 400 MG tablet Take 400 mg by mouth Every Night.     • fluticasone (FLONASE) 50 MCG/ACT nasal spray 2 sprays into each nostril As Needed for Rhinitis.     • glimepiride (AMARYL) 2 MG tablet Take 4 mg by mouth 2 (Two) Times a Day.     • glimepiride (AMARYL) 2 MG tablet TAKE 1 TABLET BY MOUTH 2 (TWO) TIMES DAILY WITH FOOD     • HYDROcodone-acetaminophen (NORCO) 7.5-325 MG per tablet Take 1 tablet by mouth Every 6 (Six) Hours As Needed for Moderate Pain . 30 tablet 0   • losartan (COZAAR) 25 MG tablet      • magnesium oxide (MAGOX) 400 (241.3 Mg) MG tablet tablet Take 400 mg by mouth 2 (Two) Times a Day.     • metFORMIN (GLUCOPHAGE) 1000 MG tablet Take 1,000 mg by mouth 2 (Two) Times a Day With Meals. Patient states he only uses when needed     • metFORMIN (GLUCOPHAGE) 1000 MG tablet TAKE 1 TABLET BY MOUTH 2 (TWO) TIMES DAILY WITH MEALS. INDICATIONS: TYPE 2 DIABETES MELLITUS     • metoprolol succinate XL (TOPROL-XL) 25 MG 24 hr tablet      • metoprolol tartrate (LOPRESSOR) 25 MG tablet Take 12.5 mg by mouth Every Night.     • ONE TOUCH ULTRA TEST test strip USE TO TEST BLOOD SUGAR THREE TIMES A DAY  1   • valsartan (DIOVAN) 160 MG tablet Take 40 mg by mouth Every Night. 1/2 tablet daily      • valsartan-hydrochlorothiazide (DIOVAN-HCT) 160-12.5 MG per tablet Take  by mouth.     • vitamin C (ASCORBIC ACID) 500 MG tablet Take 500 mg by mouth Every Night.     • vitamin E 200 UNIT capsule Take 200 Units by mouth Every Night.       No current facility-administered medications for this visit.            OBJECTIVE    Ht 190.5 cm (75\")   Wt (!) 142 kg (312 lb)   BMI 39.00 kg/m²     Review of Systems   Constitutional: Negative.    HENT: Negative.    Eyes: Negative.    Respiratory: Negative.    Cardiovascular: Negative.    Skin: Positive for wound.   Neurological: Positive for " numbness. Negative for dizziness.   Psychiatric/Behavioral: Negative.              Physical Exam   Constitutional: He is oriented to person, place, and time. He appears well-developed and well-nourished. No distress.   HENT:   Head: Normocephalic and atraumatic.   Nose: Nose normal.   Pulmonary/Chest: Effort normal. No respiratory distress. He has no wheezes.   Neurological: He is alert and oriented to person, place, and time. He displays normal reflexes.   Psychiatric: He has a normal mood and affect. His behavior is normal.   Vitals reviewed.    Lower Extremity Exam:     Cardiovascular:    CFT brisk  to all digits  Skin temp is warm to warm from proximal tibia to distal digits  Pedal hair growth diminished.   No erythema or edema noted      Musculoskeletal:  TMA noted Right foot  Left hallux slightly abducted  Digits 2 through 5 left are rectus     Dermatological:   No subcutaneous nodules or masses noted    Wound noted to plantar lateral left foot measuring 0.3 x 0.2 x 0.1 cm.  Base is  granular.  No signs of infection noted.     Neurological:   Protective sensation absent  Sensation intact to light touch         Procedures        ASSESSMENT AND PLAN    Emre was seen today for follow-up, foot ulcer and tcc change.    Diagnoses and all orders for this visit:    Diabetic ulcer of left midfoot associated with type 2 diabetes mellitus, with fat layer exposed (CMS/HCC)    Charcot's joint of left foot      - Ulcer continues to reduce in size  - Total contact cast re-applied to left lower extremity.  - All his questions were answered  - Return to clinic 1 week             This document has been electronically signed by Marco A Thompson DPM on January 21, 2019 8:46 AM

## 2019-01-28 ENCOUNTER — OFFICE VISIT (OUTPATIENT)
Dept: PODIATRY | Facility: CLINIC | Age: 59
End: 2019-01-28

## 2019-01-28 VITALS — HEART RATE: 88 BPM | OXYGEN SATURATION: 98 % | BODY MASS INDEX: 38.79 KG/M2 | HEIGHT: 75 IN | WEIGHT: 312 LBS

## 2019-01-28 DIAGNOSIS — L97.422 DIABETIC ULCER OF LEFT MIDFOOT ASSOCIATED WITH TYPE 2 DIABETES MELLITUS, WITH FAT LAYER EXPOSED (HCC): Primary | ICD-10-CM

## 2019-01-28 DIAGNOSIS — E11.621 DIABETIC ULCER OF LEFT MIDFOOT ASSOCIATED WITH TYPE 2 DIABETES MELLITUS, WITH FAT LAYER EXPOSED (HCC): Primary | ICD-10-CM

## 2019-01-28 DIAGNOSIS — M14.672 CHARCOT'S JOINT OF LEFT FOOT: ICD-10-CM

## 2019-01-28 DIAGNOSIS — E11.42 TYPE 2 DIABETES MELLITUS WITH PERIPHERAL NEUROPATHY (HCC): ICD-10-CM

## 2019-01-28 PROCEDURE — 29445 APPL RIGID TOT CNTC LEG CAST: CPT | Performed by: PODIATRIST

## 2019-01-28 NOTE — PROGRESS NOTES
Emre Lisa  1960  58 y.o. male   [place] - 3/20/2018  BS - 103 per pt    Patient presents today for recheck of his left foot ulcer and TCC change.    01/28/19  Chief Complaint   Patient presents with   • Left Foot - Follow-up, TCC change, Foot Ulcer       History of Present Illness    Patient presents to clinic today follow-up of his left foot wound.  He is currently being treated with weekly total contact casts.  He denies any new pedal complaints.    Past Medical History:   Diagnosis Date   • Arthritis    • Atrial fibrillation (CMS/HCC)    • Diabetes mellitus (CMS/HCC)    • Diabetic foot ulcer associated with type 2 diabetes mellitus (CMS/HCC)     left great toe   • Hallux malleus of left foot    • Hammer toe    • Hypertension    • MI (myocardial infarction) (CMS/HCC)    • Neuropathy in diabetes (CMS/HCC)    • Onychomycosis          Past Surgical History:   Procedure Laterality Date   • AMPUTATION DIGIT Right 3/5/2017    Procedure: RIGHT AMPUTATION TRANSMETATRASAL , RECESSION GASTROCNEMOUS;  Surgeon: Marco A Thompson DPM;  Location: [facility];  Service:    • CARDIAC DEFIBRILLATOR PLACEMENT  2010, 2016   • CARPAL TUNNEL RELEASE  2015    elbow and wrist left arm   • FOOT IRRIGATION, DEBRIDEMENT AND REPAIR Left 3/7/2018    Procedure: FOOT IRRIGATION, DEBRIDEMENT AND REPAIR;  Surgeon: Marco A Thompson DPM;  Location: [facility];  Service:    • FOOT IRRIGATION, DEBRIDEMENT AND REPAIR Left 3/10/2018    Procedure: FOOT IRRIGATION, DEBRIDEMENT AND REPAIR;  Surgeon: Marco A Thompson DPM;  Location: [facility];  Service: Podiatry   • FOOT SURGERY     • FOOT WOUND CLOSURE Right 3/2/2017    Procedure: INCISION AND DRAINAGE, RIGHT FOOT;  Surgeon: Tung Rosenthal DPM;  Location: [facility];  Service:    • HAMMER TOE REPAIR Left 2/15/2018    Procedure: HAMMERTOE CORRECTION LEFT SECOND, THIRD AND FOURTH TOES AND HALLUX INTERPHALANGEAL JOINT ARTHRODESIS LEFT FOOT (Micro Asnis, 4.0 & 5.0 Asnis)      (c-arm);  Surgeon:  Marco A Thompson DPM;  Location: [facility];  Service:    • HARDWARE REMOVAL Left 3/5/2018    Procedure: ANKLE/FOOT HARDWARE REMOVAL;  Surgeon: Maroc A Thompson DPM;  Location: [facility] OR;  Service:    • INCISION AND DRAINAGE LEG Left 3/5/2018    Procedure: INCISION AND DRAINAGE LOWER EXTREMITY;  Surgeon: Marco A Thompson DPM;  Location: [facility];  Service:    • TOE AMPUTATION Right 2016   • TONSILECTOMY, ADENOIDECTOMY, BILATERAL MYRINGOTOMY AND TUBES      age 25         Family History   Problem Relation Age of Onset   • Diabetes Father    • Stroke Father    • No Known Problems Maternal Grandmother    • No Known Problems Maternal Grandfather    • No Known Problems Paternal Grandmother    • No Known Problems Paternal Grandfather    • No Known Problems Son    • No Known Problems Daughter    • No Known Problems Daughter          Social History     Socioeconomic History   • Marital status:      Spouse name: Not on file   • Number of children: Not on file   • Years of education: Not on file   • Highest education level: Not on file   Social Needs   • Financial resource strain: Not on file   • Food insecurity - worry: Not on file   • Food insecurity - inability: Not on file   • Transportation needs - medical: Not on file   • Transportation needs - non-medical: Not on file   Occupational History   • Not on file   Tobacco Use   • Smoking status: Never Smoker   • Smokeless tobacco: Never Used   Substance and Sexual Activity   • Alcohol use: No   • Drug use: No   • Sexual activity: Defer   Other Topics Concern   • Not on file   Social History Narrative   • Not on file         Current Outpatient Medications   Medication Sig Dispense Refill   • aspirin 81 MG chewable tablet Chew 81 mg Every Night.     • Bacitracin Zinc (MAGIC BUTT OINTMENT) Apply 1 each topically 2 (Two) Times a Day. 120 g 1   • cefdinir (OMNICEF) 300 MG capsule      • Cholecalciferol (VITAMIN D3) 5000 UNITS capsule capsule Take 5,000 Units by mouth Every  "Night.     • CLONIDINE HCL 5 MCG/ML ORAL SOLN Take  by mouth.     • Cyanocobalamin (VITAMIN B 12 PO) Take 1,000 mcg by mouth Every Night.     • doxycycline (VIBRAMYCIN) 100 MG capsule Take 1 capsule by mouth 2 (Two) Times a Day. 28 capsule 0   • dronedarone (MULTAQ) 400 MG tablet Take 400 mg by mouth Every Night.     • fluticasone (FLONASE) 50 MCG/ACT nasal spray 2 sprays into each nostril As Needed for Rhinitis.     • glimepiride (AMARYL) 2 MG tablet Take 4 mg by mouth 2 (Two) Times a Day.     • glimepiride (AMARYL) 2 MG tablet TAKE 1 TABLET BY MOUTH 2 (TWO) TIMES DAILY WITH FOOD     • HYDROcodone-acetaminophen (NORCO) 7.5-325 MG per tablet Take 1 tablet by mouth Every 6 (Six) Hours As Needed for Moderate Pain . 30 tablet 0   • losartan (COZAAR) 25 MG tablet      • magnesium oxide (MAGOX) 400 (241.3 Mg) MG tablet tablet Take 400 mg by mouth 2 (Two) Times a Day.     • metFORMIN (GLUCOPHAGE) 1000 MG tablet Take 1,000 mg by mouth 2 (Two) Times a Day With Meals. Patient states he only uses when needed     • metFORMIN (GLUCOPHAGE) 1000 MG tablet TAKE 1 TABLET BY MOUTH 2 (TWO) TIMES DAILY WITH MEALS. INDICATIONS: TYPE 2 DIABETES MELLITUS     • metoprolol succinate XL (TOPROL-XL) 25 MG 24 hr tablet      • metoprolol tartrate (LOPRESSOR) 25 MG tablet Take 12.5 mg by mouth Every Night.     • ONE TOUCH ULTRA TEST test strip USE TO TEST BLOOD SUGAR THREE TIMES A DAY  1   • valsartan (DIOVAN) 160 MG tablet Take 40 mg by mouth Every Night. 1/2 tablet daily      • valsartan-hydrochlorothiazide (DIOVAN-HCT) 160-12.5 MG per tablet Take  by mouth.     • vitamin C (ASCORBIC ACID) 500 MG tablet Take 500 mg by mouth Every Night.     • vitamin E 200 UNIT capsule Take 200 Units by mouth Every Night.       No current facility-administered medications for this visit.            OBJECTIVE    Pulse 88   Ht 190.5 cm (75\")   Wt (!) 142 kg (312 lb)   SpO2 98%   BMI 39.00 kg/m²     Review of Systems   Constitutional: Negative.    HENT: " Negative.    Eyes: Negative.    Respiratory: Negative.    Musculoskeletal: Negative.    Skin: Positive for wound.   Neurological: Positive for numbness.   Psychiatric/Behavioral: Negative.              Physical Exam   Constitutional: He is oriented to person, place, and time. He appears well-developed and well-nourished. No distress.   HENT:   Head: Normocephalic and atraumatic.   Nose: Nose normal.   Pulmonary/Chest: Effort normal. No respiratory distress. He has no wheezes.   Neurological: He is alert and oriented to person, place, and time.   Psychiatric: He has a normal mood and affect. Judgment normal.   Vitals reviewed.    Left Lower Extremity:     Cardiovascular:    CFT brisk  to all digits  Skin temp is warm to warm from proximal tibia to distal digits  Pedal hair growth diminished.   No erythema or edema noted      Musculoskeletal:  Left hallux slightly abducted  Digits 2 through 5 left are rectus     Dermatological:   No subcutaneous nodules or masses noted    No open wounds noted    Neurological:   Protective sensation absent        Procedures        ASSESSMENT AND PLAN    Emre was seen today for follow-up, tcc change and foot ulcer.    Diagnoses and all orders for this visit:    Diabetic ulcer of left midfoot associated with type 2 diabetes mellitus, with fat layer exposed (CMS/HCC)    Charcot's joint of left foot    Type 2 diabetes mellitus with peripheral neuropathy (CMS/HCC)      - Ulcer has healed.  - Total contact cast re-applied to left lower extremity   - All his questions were answered  - Return to clinic 1 week             This document has been electronically signed by Marco A Thompson DPM on January 28, 2019 8:40 AM

## 2019-02-04 ENCOUNTER — OFFICE VISIT (OUTPATIENT)
Dept: PODIATRY | Facility: CLINIC | Age: 59
End: 2019-02-04

## 2019-02-04 VITALS — HEIGHT: 75 IN | WEIGHT: 312 LBS | OXYGEN SATURATION: 98 % | BODY MASS INDEX: 38.79 KG/M2 | HEART RATE: 86 BPM

## 2019-02-04 DIAGNOSIS — L97.422 DIABETIC ULCER OF LEFT MIDFOOT ASSOCIATED WITH TYPE 2 DIABETES MELLITUS, WITH FAT LAYER EXPOSED (HCC): Primary | ICD-10-CM

## 2019-02-04 DIAGNOSIS — E11.621 DIABETIC ULCER OF LEFT MIDFOOT ASSOCIATED WITH TYPE 2 DIABETES MELLITUS, WITH FAT LAYER EXPOSED (HCC): Primary | ICD-10-CM

## 2019-02-04 DIAGNOSIS — M14.672 CHARCOT'S JOINT OF LEFT FOOT: ICD-10-CM

## 2019-02-04 PROCEDURE — 99212 OFFICE O/P EST SF 10 MIN: CPT | Performed by: PODIATRIST

## 2019-02-04 NOTE — PROGRESS NOTES
Emre Lisa  1960  58 y.o. male   [place] - 3/20/2018  BS - 103 per pt    Patient presents today for recheck of his left foot     02/04/19  Chief Complaint   Patient presents with   • Left Foot - Follow-up, Foot Ulcer       History of Present Illness    Patient presents to clinic today follow-up of his left foot. He is currently being treated with weekly total contact casts.  He denies any new pedal complaints.    Past Medical History:   Diagnosis Date   • Arthritis    • Atrial fibrillation (CMS/HCC)    • Diabetes mellitus (CMS/HCC)    • Diabetic foot ulcer associated with type 2 diabetes mellitus (CMS/HCC)     left great toe   • Hallux malleus of left foot    • Hammer toe    • Hypertension    • MI (myocardial infarction) (CMS/HCC)    • Neuropathy in diabetes (CMS/HCC)    • Onychomycosis          Past Surgical History:   Procedure Laterality Date   • AMPUTATION DIGIT Right 3/5/2017    Procedure: RIGHT AMPUTATION TRANSMETATRASAL , RECESSION GASTROCNEMOUS;  Surgeon: Marco A Thompson DPM;  Location: [facility];  Service:    • CARDIAC DEFIBRILLATOR PLACEMENT  2010, 2016   • CARPAL TUNNEL RELEASE  2015    elbow and wrist left arm   • FOOT IRRIGATION, DEBRIDEMENT AND REPAIR Left 3/7/2018    Procedure: FOOT IRRIGATION, DEBRIDEMENT AND REPAIR;  Surgeon: Marco A Thompson DPM;  Location: [facility];  Service:    • FOOT IRRIGATION, DEBRIDEMENT AND REPAIR Left 3/10/2018    Procedure: FOOT IRRIGATION, DEBRIDEMENT AND REPAIR;  Surgeon: Marco A Thompson DPM;  Location: [facility];  Service: Podiatry   • FOOT SURGERY     • FOOT WOUND CLOSURE Right 3/2/2017    Procedure: INCISION AND DRAINAGE, RIGHT FOOT;  Surgeon: Tung Rosenthal DPM;  Location: [facility];  Service:    • HAMMER TOE REPAIR Left 2/15/2018    Procedure: HAMMERTOE CORRECTION LEFT SECOND, THIRD AND FOURTH TOES AND HALLUX INTERPHALANGEAL JOINT ARTHRODESIS LEFT FOOT (Micro Asnis, 4.0 & 5.0 Asnis)      (c-arm);  Surgeon: Marco A Thompson DPM;  Location: [facility];   Service:    • HARDWARE REMOVAL Left 3/5/2018    Procedure: ANKLE/FOOT HARDWARE REMOVAL;  Surgeon: Marco A Thompson DPM;  Location: [facility] OR;  Service:    • INCISION AND DRAINAGE LEG Left 3/5/2018    Procedure: INCISION AND DRAINAGE LOWER EXTREMITY;  Surgeon: Marco A Thompson DPM;  Location: [facility];  Service:    • TOE AMPUTATION Right 2016   • TONSILECTOMY, ADENOIDECTOMY, BILATERAL MYRINGOTOMY AND TUBES      age 25         Family History   Problem Relation Age of Onset   • Diabetes Father    • Stroke Father    • No Known Problems Maternal Grandmother    • No Known Problems Maternal Grandfather    • No Known Problems Paternal Grandmother    • No Known Problems Paternal Grandfather    • No Known Problems Son    • No Known Problems Daughter    • No Known Problems Daughter          Social History     Socioeconomic History   • Marital status:      Spouse name: Not on file   • Number of children: Not on file   • Years of education: Not on file   • Highest education level: Not on file   Social Needs   • Financial resource strain: Not on file   • Food insecurity - worry: Not on file   • Food insecurity - inability: Not on file   • Transportation needs - medical: Not on file   • Transportation needs - non-medical: Not on file   Occupational History   • Not on file   Tobacco Use   • Smoking status: Never Smoker   • Smokeless tobacco: Never Used   Substance and Sexual Activity   • Alcohol use: No   • Drug use: No   • Sexual activity: Defer   Other Topics Concern   • Not on file   Social History Narrative   • Not on file         Current Outpatient Medications   Medication Sig Dispense Refill   • aspirin 81 MG chewable tablet Chew 81 mg Every Night.     • Bacitracin Zinc (MAGIC BUTT OINTMENT) Apply 1 each topically 2 (Two) Times a Day. 120 g 1   • cefdinir (OMNICEF) 300 MG capsule      • Cholecalciferol (VITAMIN D3) 5000 UNITS capsule capsule Take 5,000 Units by mouth Every Night.     • CLONIDINE HCL 5 MCG/ML ORAL SOLN  "Take  by mouth.     • Cyanocobalamin (VITAMIN B 12 PO) Take 1,000 mcg by mouth Every Night.     • doxycycline (VIBRAMYCIN) 100 MG capsule Take 1 capsule by mouth 2 (Two) Times a Day. 28 capsule 0   • dronedarone (MULTAQ) 400 MG tablet Take 400 mg by mouth Every Night.     • fluticasone (FLONASE) 50 MCG/ACT nasal spray 2 sprays into each nostril As Needed for Rhinitis.     • glimepiride (AMARYL) 2 MG tablet Take 4 mg by mouth 2 (Two) Times a Day.     • glimepiride (AMARYL) 2 MG tablet TAKE 1 TABLET BY MOUTH 2 (TWO) TIMES DAILY WITH FOOD     • HYDROcodone-acetaminophen (NORCO) 7.5-325 MG per tablet Take 1 tablet by mouth Every 6 (Six) Hours As Needed for Moderate Pain . 30 tablet 0   • losartan (COZAAR) 25 MG tablet      • magnesium oxide (MAGOX) 400 (241.3 Mg) MG tablet tablet Take 400 mg by mouth 2 (Two) Times a Day.     • metFORMIN (GLUCOPHAGE) 1000 MG tablet Take 1,000 mg by mouth 2 (Two) Times a Day With Meals. Patient states he only uses when needed     • metFORMIN (GLUCOPHAGE) 1000 MG tablet TAKE 1 TABLET BY MOUTH 2 (TWO) TIMES DAILY WITH MEALS. INDICATIONS: TYPE 2 DIABETES MELLITUS     • metoprolol succinate XL (TOPROL-XL) 25 MG 24 hr tablet      • metoprolol tartrate (LOPRESSOR) 25 MG tablet Take 12.5 mg by mouth Every Night.     • ONE TOUCH ULTRA TEST test strip USE TO TEST BLOOD SUGAR THREE TIMES A DAY  1   • valsartan (DIOVAN) 160 MG tablet Take 40 mg by mouth Every Night. 1/2 tablet daily      • valsartan-hydrochlorothiazide (DIOVAN-HCT) 160-12.5 MG per tablet Take  by mouth.     • vitamin C (ASCORBIC ACID) 500 MG tablet Take 500 mg by mouth Every Night.     • vitamin E 200 UNIT capsule Take 200 Units by mouth Every Night.       No current facility-administered medications for this visit.            OBJECTIVE    Pulse 86   Ht 190.5 cm (75\")   Wt (!) 142 kg (312 lb)   SpO2 98%   BMI 39.00 kg/m²     Review of Systems   Constitutional: Negative.    HENT: Negative.    Eyes: Negative.    Respiratory: " Negative.    Cardiovascular: Negative.    Gastrointestinal: Negative.    Musculoskeletal: Negative.    Skin: Negative.    Neurological: Positive for numbness.   Psychiatric/Behavioral: Negative.              Physical Exam   Constitutional: He is oriented to person, place, and time. He appears well-developed and well-nourished. No distress.   HENT:   Head: Normocephalic and atraumatic.   Nose: Nose normal.   Pulmonary/Chest: Effort normal. No respiratory distress. He has no wheezes.   Neurological: He is alert and oriented to person, place, and time.   Psychiatric: He has a normal mood and affect. Judgment normal.   Vitals reviewed.    Left Lower Extremity:     Cardiovascular:    CFT brisk  to all digits  Skin temp is warm to warm from proximal tibia to distal digits  Pedal hair growth diminished.   No erythema or edema noted      Musculoskeletal:  Left hallux slightly abducted  Digits 2 through 5 left are rectus     Dermatological:   No subcutaneous nodules or masses noted    No open wounds noted    Neurological:   Protective sensation absent        Procedures        ASSESSMENT AND PLAN    Emre was seen today for follow-up and foot ulcer.    Diagnoses and all orders for this visit:    Diabetic ulcer of left midfoot associated with type 2 diabetes mellitus, with fat layer exposed (CMS/HCC)    Charcot's joint of left foot      - Ulcer remains healed.  - Transition to custom diabetic shoes  - All his questions were answered  - Return to clinic 1 week             This document has been electronically signed by Marco A Thompson DPM on February 4, 2019 12:04 PM

## 2019-02-11 ENCOUNTER — OFFICE VISIT (OUTPATIENT)
Dept: PODIATRY | Facility: CLINIC | Age: 59
End: 2019-02-11

## 2019-02-11 VITALS — WEIGHT: 312 LBS | HEIGHT: 75 IN | BODY MASS INDEX: 38.79 KG/M2 | OXYGEN SATURATION: 98 %

## 2019-02-11 DIAGNOSIS — E11.42 TYPE 2 DIABETES MELLITUS WITH PERIPHERAL NEUROPATHY (HCC): ICD-10-CM

## 2019-02-11 DIAGNOSIS — M14.672 CHARCOT'S JOINT OF LEFT FOOT: ICD-10-CM

## 2019-02-11 DIAGNOSIS — L97.422 DIABETIC ULCER OF LEFT MIDFOOT ASSOCIATED WITH TYPE 2 DIABETES MELLITUS, WITH FAT LAYER EXPOSED (HCC): Primary | ICD-10-CM

## 2019-02-11 DIAGNOSIS — E11.621 DIABETIC ULCER OF LEFT MIDFOOT ASSOCIATED WITH TYPE 2 DIABETES MELLITUS, WITH FAT LAYER EXPOSED (HCC): Primary | ICD-10-CM

## 2019-02-11 PROCEDURE — 99212 OFFICE O/P EST SF 10 MIN: CPT | Performed by: PODIATRIST

## 2019-02-11 NOTE — PROGRESS NOTES
Emre Bergeron Sloan  1960  58 y.o. male   [place] - 3/20/2018  BS - 96 per pt    Patient presents today for recheck of his left foot     02/11/19  Chief Complaint   Patient presents with   • Left Foot - Follow-up       History of Present Illness    Patient presents to clinic today follow-up of his left foot. He is ambulating in custom diabetic shoes.  He is using callus cream daily to the left foot.  He denies pain.    Past Medical History:   Diagnosis Date   • Arthritis    • Atrial fibrillation (CMS/HCC)    • Diabetes mellitus (CMS/HCC)    • Diabetic foot ulcer associated with type 2 diabetes mellitus (CMS/HCC)     left great toe   • Hallux malleus of left foot    • Hammer toe    • Hypertension    • MI (myocardial infarction) (CMS/HCC)    • Neuropathy in diabetes (CMS/HCC)    • Onychomycosis          Past Surgical History:   Procedure Laterality Date   • AMPUTATION DIGIT Right 3/5/2017    Procedure: RIGHT AMPUTATION TRANSMETATRASAL , RECESSION GASTROCNEMOUS;  Surgeon: Marco A Thompson DPM;  Location: [facility];  Service:    • CARDIAC DEFIBRILLATOR PLACEMENT  2010, 2016   • CARPAL TUNNEL RELEASE  2015    elbow and wrist left arm   • FOOT IRRIGATION, DEBRIDEMENT AND REPAIR Left 3/7/2018    Procedure: FOOT IRRIGATION, DEBRIDEMENT AND REPAIR;  Surgeon: Marco A Thompson DPM;  Location: [facility];  Service:    • FOOT IRRIGATION, DEBRIDEMENT AND REPAIR Left 3/10/2018    Procedure: FOOT IRRIGATION, DEBRIDEMENT AND REPAIR;  Surgeon: Marco A Thompson DPM;  Location: [facility];  Service: Podiatry   • FOOT SURGERY     • FOOT WOUND CLOSURE Right 3/2/2017    Procedure: INCISION AND DRAINAGE, RIGHT FOOT;  Surgeon: Tung Rosenthal DPM;  Location: [facility];  Service:    • HAMMER TOE REPAIR Left 2/15/2018    Procedure: HAMMERTOE CORRECTION LEFT SECOND, THIRD AND FOURTH TOES AND HALLUX INTERPHALANGEAL JOINT ARTHRODESIS LEFT FOOT (Micro Asnis, 4.0 & 5.0 Asnis)      (c-arm);  Surgeon: Marco A Thompson DPM;  Location: [facility];   Service:    • HARDWARE REMOVAL Left 3/5/2018    Procedure: ANKLE/FOOT HARDWARE REMOVAL;  Surgeon: Marco A Thompson DPM;  Location: [facility] OR;  Service:    • INCISION AND DRAINAGE LEG Left 3/5/2018    Procedure: INCISION AND DRAINAGE LOWER EXTREMITY;  Surgeon: Marco A Thompson DPM;  Location: [facility];  Service:    • TOE AMPUTATION Right 2016   • TONSILECTOMY, ADENOIDECTOMY, BILATERAL MYRINGOTOMY AND TUBES      age 25         Family History   Problem Relation Age of Onset   • Diabetes Father    • Stroke Father    • No Known Problems Maternal Grandmother    • No Known Problems Maternal Grandfather    • No Known Problems Paternal Grandmother    • No Known Problems Paternal Grandfather    • No Known Problems Son    • No Known Problems Daughter    • No Known Problems Daughter          Social History     Socioeconomic History   • Marital status:      Spouse name: Not on file   • Number of children: Not on file   • Years of education: Not on file   • Highest education level: Not on file   Social Needs   • Financial resource strain: Not on file   • Food insecurity - worry: Not on file   • Food insecurity - inability: Not on file   • Transportation needs - medical: Not on file   • Transportation needs - non-medical: Not on file   Occupational History   • Not on file   Tobacco Use   • Smoking status: Never Smoker   • Smokeless tobacco: Never Used   Substance and Sexual Activity   • Alcohol use: No   • Drug use: No   • Sexual activity: Defer   Other Topics Concern   • Not on file   Social History Narrative   • Not on file         Current Outpatient Medications   Medication Sig Dispense Refill   • aspirin 81 MG chewable tablet Chew 81 mg Every Night.     • Bacitracin Zinc (MAGIC BUTT OINTMENT) Apply 1 each topically 2 (Two) Times a Day. 120 g 1   • cefdinir (OMNICEF) 300 MG capsule      • Cholecalciferol (VITAMIN D3) 5000 UNITS capsule capsule Take 5,000 Units by mouth Every Night.     • CLONIDINE HCL 5 MCG/ML ORAL SOLN  "Take  by mouth.     • Cyanocobalamin (VITAMIN B 12 PO) Take 1,000 mcg by mouth Every Night.     • doxycycline (VIBRAMYCIN) 100 MG capsule Take 1 capsule by mouth 2 (Two) Times a Day. 28 capsule 0   • dronedarone (MULTAQ) 400 MG tablet Take 400 mg by mouth Every Night.     • fluticasone (FLONASE) 50 MCG/ACT nasal spray 2 sprays into each nostril As Needed for Rhinitis.     • glimepiride (AMARYL) 2 MG tablet Take 4 mg by mouth 2 (Two) Times a Day.     • glimepiride (AMARYL) 2 MG tablet TAKE 1 TABLET BY MOUTH 2 (TWO) TIMES DAILY WITH FOOD     • HYDROcodone-acetaminophen (NORCO) 7.5-325 MG per tablet Take 1 tablet by mouth Every 6 (Six) Hours As Needed for Moderate Pain . 30 tablet 0   • losartan (COZAAR) 25 MG tablet      • magnesium oxide (MAGOX) 400 (241.3 Mg) MG tablet tablet Take 400 mg by mouth 2 (Two) Times a Day.     • metFORMIN (GLUCOPHAGE) 1000 MG tablet Take 1,000 mg by mouth 2 (Two) Times a Day With Meals. Patient states he only uses when needed     • metFORMIN (GLUCOPHAGE) 1000 MG tablet TAKE 1 TABLET BY MOUTH 2 (TWO) TIMES DAILY WITH MEALS. INDICATIONS: TYPE 2 DIABETES MELLITUS     • metoprolol succinate XL (TOPROL-XL) 25 MG 24 hr tablet      • metoprolol tartrate (LOPRESSOR) 25 MG tablet Take 12.5 mg by mouth Every Night.     • ONE TOUCH ULTRA TEST test strip USE TO TEST BLOOD SUGAR THREE TIMES A DAY  1   • valsartan (DIOVAN) 160 MG tablet Take 40 mg by mouth Every Night. 1/2 tablet daily      • valsartan-hydrochlorothiazide (DIOVAN-HCT) 160-12.5 MG per tablet Take  by mouth.     • vitamin C (ASCORBIC ACID) 500 MG tablet Take 500 mg by mouth Every Night.     • vitamin E 200 UNIT capsule Take 200 Units by mouth Every Night.       No current facility-administered medications for this visit.            OBJECTIVE    Ht 190.5 cm (75\")   Wt (!) 142 kg (312 lb)   SpO2 98%   BMI 39.00 kg/m²     Review of Systems   Constitutional: Negative.    HENT: Negative.    Eyes: Negative.    Respiratory: Negative.  "   Cardiovascular: Negative.    Gastrointestinal: Negative.    Skin: Negative.    Neurological: Positive for numbness.   Psychiatric/Behavioral: Negative.              Physical Exam   Constitutional: He is oriented to person, place, and time. He appears well-developed and well-nourished. No distress.   HENT:   Head: Normocephalic and atraumatic.   Nose: Nose normal.   Pulmonary/Chest: Effort normal. No respiratory distress. He has no wheezes.   Neurological: He is alert and oriented to person, place, and time.   Psychiatric: He has a normal mood and affect. Judgment normal.   Vitals reviewed.    Left Lower Extremity:     Cardiovascular:    CFT brisk  to all digits  Skin temp is warm to warm from proximal tibia to distal digits  Pedal hair growth diminished.   No erythema or edema noted      Musculoskeletal:  Left hallux slightly abducted  Digits 2 through 5 left are rectus     Dermatological:   No subcutaneous nodules or masses noted    No open wounds noted    Neurological:   Protective sensation absent        Procedures        ASSESSMENT AND PLAN    Emre was seen today for follow-up.    Diagnoses and all orders for this visit:    Diabetic ulcer of left midfoot associated with type 2 diabetes mellitus, with fat layer exposed (CMS/HCC)    Charcot's joint of left foot    Type 2 diabetes mellitus with peripheral neuropathy (CMS/HCC)      - Ulcer remains healed.  We will continue to monitor it closely  - Continue custom diabetic shoes  - All his questions were answered  - Return to clinic 2 weeks            This document has been electronically signed by Marco A Thompson DPM on February 11, 2019 10:29 AM

## 2019-02-25 ENCOUNTER — OFFICE VISIT (OUTPATIENT)
Dept: PODIATRY | Facility: CLINIC | Age: 59
End: 2019-02-25

## 2019-02-25 VITALS — WEIGHT: 312 LBS | HEIGHT: 75 IN | HEART RATE: 94 BPM | OXYGEN SATURATION: 99 % | BODY MASS INDEX: 38.79 KG/M2

## 2019-02-25 DIAGNOSIS — M14.672 CHARCOT'S JOINT OF LEFT FOOT: ICD-10-CM

## 2019-02-25 DIAGNOSIS — L97.422 DIABETIC ULCER OF LEFT MIDFOOT ASSOCIATED WITH TYPE 2 DIABETES MELLITUS, WITH FAT LAYER EXPOSED (HCC): Primary | ICD-10-CM

## 2019-02-25 DIAGNOSIS — E11.621 DIABETIC ULCER OF LEFT MIDFOOT ASSOCIATED WITH TYPE 2 DIABETES MELLITUS, WITH FAT LAYER EXPOSED (HCC): Primary | ICD-10-CM

## 2019-02-25 PROCEDURE — 99212 OFFICE O/P EST SF 10 MIN: CPT | Performed by: PODIATRIST

## 2019-02-25 NOTE — PROGRESS NOTES
Emre Lisa  1960  58 y.o. male   [place] - 3/20/2018  BS - 107 per pt    Patient presents today for recheck of his left foot     02/25/19  Chief Complaint   Patient presents with   • Left Foot - Follow-up, Wound Check       History of Present Illness    Patient presents to clinic today follow-up of his left foot. He has no pedal complaints today.     Past Medical History:   Diagnosis Date   • Arthritis    • Atrial fibrillation (CMS/HCC)    • Diabetes mellitus (CMS/HCC)    • Diabetic foot ulcer associated with type 2 diabetes mellitus (CMS/HCC)     left great toe   • Hallux malleus of left foot    • Hammer toe    • Hypertension    • MI (myocardial infarction) (CMS/HCC)    • Neuropathy in diabetes (CMS/HCC)    • Onychomycosis          Past Surgical History:   Procedure Laterality Date   • AMPUTATION DIGIT Right 3/5/2017    Procedure: RIGHT AMPUTATION TRANSMETATRASAL , RECESSION GASTROCNEMOUS;  Surgeon: Marco A Thompson DPM;  Location: [facility];  Service:    • CARDIAC DEFIBRILLATOR PLACEMENT  2010, 2016   • CARPAL TUNNEL RELEASE  2015    elbow and wrist left arm   • FOOT IRRIGATION, DEBRIDEMENT AND REPAIR Left 3/7/2018    Procedure: FOOT IRRIGATION, DEBRIDEMENT AND REPAIR;  Surgeon: Marco A Thompson DPM;  Location: [facility];  Service:    • FOOT IRRIGATION, DEBRIDEMENT AND REPAIR Left 3/10/2018    Procedure: FOOT IRRIGATION, DEBRIDEMENT AND REPAIR;  Surgeon: Marco A Thompson DPM;  Location: [facility];  Service: Podiatry   • FOOT SURGERY     • FOOT WOUND CLOSURE Right 3/2/2017    Procedure: INCISION AND DRAINAGE, RIGHT FOOT;  Surgeon: Tung Rosenthal DPM;  Location: [facility];  Service:    • HAMMER TOE REPAIR Left 2/15/2018    Procedure: HAMMERTOE CORRECTION LEFT SECOND, THIRD AND FOURTH TOES AND HALLUX INTERPHALANGEAL JOINT ARTHRODESIS LEFT FOOT (Micro Asnis, 4.0 & 5.0 Asnis)      (c-arm);  Surgeon: Marco A Thompson DPM;  Location: [facility];  Service:    • HARDWARE REMOVAL Left 3/5/2018    Procedure:  ANKLE/FOOT HARDWARE REMOVAL;  Surgeon: Marco A Thompson DPM;  Location: [facility];  Service:    • INCISION AND DRAINAGE LEG Left 3/5/2018    Procedure: INCISION AND DRAINAGE LOWER EXTREMITY;  Surgeon: Marco A Thompson DPM;  Location: [facility];  Service:    • TOE AMPUTATION Right 2016   • TONSILECTOMY, ADENOIDECTOMY, BILATERAL MYRINGOTOMY AND TUBES      age 25         Family History   Problem Relation Age of Onset   • Diabetes Father    • Stroke Father    • No Known Problems Maternal Grandmother    • No Known Problems Maternal Grandfather    • No Known Problems Paternal Grandmother    • No Known Problems Paternal Grandfather    • No Known Problems Son    • No Known Problems Daughter    • No Known Problems Daughter          Social History     Socioeconomic History   • Marital status:      Spouse name: Not on file   • Number of children: Not on file   • Years of education: Not on file   • Highest education level: Not on file   Social Needs   • Financial resource strain: Not on file   • Food insecurity - worry: Not on file   • Food insecurity - inability: Not on file   • Transportation needs - medical: Not on file   • Transportation needs - non-medical: Not on file   Occupational History   • Not on file   Tobacco Use   • Smoking status: Never Smoker   • Smokeless tobacco: Never Used   Substance and Sexual Activity   • Alcohol use: No   • Drug use: No   • Sexual activity: Defer   Other Topics Concern   • Not on file   Social History Narrative   • Not on file         Current Outpatient Medications   Medication Sig Dispense Refill   • aspirin 81 MG chewable tablet Chew 81 mg Every Night.     • Bacitracin Zinc (MAGIC BUTT OINTMENT) Apply 1 each topically 2 (Two) Times a Day. 120 g 1   • cefdinir (OMNICEF) 300 MG capsule      • Cholecalciferol (VITAMIN D3) 5000 UNITS capsule capsule Take 5,000 Units by mouth Every Night.     • CLONIDINE HCL 5 MCG/ML ORAL SOLN Take  by mouth.     • Cyanocobalamin (VITAMIN B 12 PO) Take  "1,000 mcg by mouth Every Night.     • doxycycline (VIBRAMYCIN) 100 MG capsule Take 1 capsule by mouth 2 (Two) Times a Day. 28 capsule 0   • dronedarone (MULTAQ) 400 MG tablet Take 400 mg by mouth Every Night.     • fluticasone (FLONASE) 50 MCG/ACT nasal spray 2 sprays into each nostril As Needed for Rhinitis.     • glimepiride (AMARYL) 2 MG tablet Take 4 mg by mouth 2 (Two) Times a Day.     • glimepiride (AMARYL) 2 MG tablet TAKE 1 TABLET BY MOUTH 2 (TWO) TIMES DAILY WITH FOOD     • HYDROcodone-acetaminophen (NORCO) 7.5-325 MG per tablet Take 1 tablet by mouth Every 6 (Six) Hours As Needed for Moderate Pain . 30 tablet 0   • losartan (COZAAR) 25 MG tablet      • magnesium oxide (MAGOX) 400 (241.3 Mg) MG tablet tablet Take 400 mg by mouth 2 (Two) Times a Day.     • metFORMIN (GLUCOPHAGE) 1000 MG tablet Take 1,000 mg by mouth 2 (Two) Times a Day With Meals. Patient states he only uses when needed     • metFORMIN (GLUCOPHAGE) 1000 MG tablet TAKE 1 TABLET BY MOUTH 2 (TWO) TIMES DAILY WITH MEALS. INDICATIONS: TYPE 2 DIABETES MELLITUS     • metoprolol succinate XL (TOPROL-XL) 25 MG 24 hr tablet      • metoprolol tartrate (LOPRESSOR) 25 MG tablet Take 12.5 mg by mouth Every Night.     • ONE TOUCH ULTRA TEST test strip USE TO TEST BLOOD SUGAR THREE TIMES A DAY  1   • valsartan (DIOVAN) 160 MG tablet Take 40 mg by mouth Every Night. 1/2 tablet daily      • valsartan-hydrochlorothiazide (DIOVAN-HCT) 160-12.5 MG per tablet Take  by mouth.     • vitamin C (ASCORBIC ACID) 500 MG tablet Take 500 mg by mouth Every Night.     • vitamin E 200 UNIT capsule Take 200 Units by mouth Every Night.       No current facility-administered medications for this visit.            OBJECTIVE    Pulse 94   Ht 190.5 cm (75\")   Wt (!) 142 kg (312 lb)   SpO2 99%   BMI 39.00 kg/m²     Review of Systems   Constitutional: Negative.    HENT: Negative.    Respiratory: Negative.    Cardiovascular: Negative.    Gastrointestinal: Negative.  "   Musculoskeletal: Negative.    Skin: Negative.    Neurological: Positive for numbness.   Psychiatric/Behavioral: Negative.              Physical Exam   Constitutional: He is oriented to person, place, and time. He appears well-developed and well-nourished. No distress.   HENT:   Head: Normocephalic and atraumatic.   Nose: Nose normal.   Pulmonary/Chest: Effort normal. No respiratory distress. He has no wheezes.   Neurological: He is alert and oriented to person, place, and time.   Psychiatric: He has a normal mood and affect. Judgment normal.   Vitals reviewed.    Left Lower Extremity:     Cardiovascular:    CFT brisk  to all digits  Skin temp is warm to warm from proximal tibia to distal digits  Pedal hair growth diminished.   No erythema or edema noted      Musculoskeletal:  Left hallux slightly abducted  Digits 2 through 5 left are rectus     Dermatological:   No subcutaneous nodules or masses noted    No open wounds noted    Neurological:   Protective sensation absent        Procedures        ASSESSMENT AND PLAN    Emre was seen today for follow-up and wound check.    Diagnoses and all orders for this visit:    Diabetic ulcer of left midfoot associated with type 2 diabetes mellitus, with fat layer exposed (CMS/HCC)    Charcot's joint of left foot      - Ulcer remains healed.  We will continue to monitor it closely  - Continue custom diabetic shoes  - All his questions were answered  - Return to clinic 4 weeks            This document has been electronically signed by Marco A Thompson DPM on February 25, 2019 8:25 AM

## 2019-03-25 ENCOUNTER — OFFICE VISIT (OUTPATIENT)
Dept: PODIATRY | Facility: CLINIC | Age: 59
End: 2019-03-25

## 2019-03-25 VITALS — HEART RATE: 77 BPM | BODY MASS INDEX: 38.92 KG/M2 | HEIGHT: 75 IN | OXYGEN SATURATION: 99 % | WEIGHT: 313.05 LBS

## 2019-03-25 DIAGNOSIS — B35.1 ONYCHOMYCOSIS: Primary | ICD-10-CM

## 2019-03-25 DIAGNOSIS — M14.672 CHARCOT'S JOINT OF LEFT FOOT: ICD-10-CM

## 2019-03-25 DIAGNOSIS — Z89.431 S/P TRANSMETATARSAL AMPUTATION OF FOOT, RIGHT (HCC): ICD-10-CM

## 2019-03-25 DIAGNOSIS — M79.675 CHRONIC PAIN OF TOE OF LEFT FOOT: ICD-10-CM

## 2019-03-25 DIAGNOSIS — G89.29 CHRONIC PAIN OF TOE OF LEFT FOOT: ICD-10-CM

## 2019-03-25 DIAGNOSIS — E11.42 TYPE 2 DIABETES MELLITUS WITH PERIPHERAL NEUROPATHY (HCC): ICD-10-CM

## 2019-03-25 PROCEDURE — 11720 DEBRIDE NAIL 1-5: CPT | Performed by: PODIATRIST

## 2019-03-25 PROCEDURE — 99213 OFFICE O/P EST LOW 20 MIN: CPT | Performed by: PODIATRIST

## 2019-03-25 NOTE — PROGRESS NOTES
Emre Lisa  1960  59 y.o. male   [place] - 3/20/2018  BS - 102 per pt    Patient presents today for recheck of his left foot     03/25/19  Chief Complaint   Patient presents with   • Left Foot - Follow-up, Wound Check       History of Present Illness    Patient presents to clinic today follow-up of his left foot.  He continues to use Eucerin with urea twice daily to the bottom of the left foot.  He is ambulating in a diabetic shoe gear.  He does complain of long, thick and painful toenails on his left foot.  Pain is relieved with debridement.  He denies any other pedal complaints today.    Past Medical History:   Diagnosis Date   • Arthritis    • Atrial fibrillation (CMS/HCC)    • Diabetes mellitus (CMS/HCC)    • Diabetic foot ulcer associated with type 2 diabetes mellitus (CMS/HCC)     left great toe   • Hallux malleus of left foot    • Hammer toe    • Hypertension    • MI (myocardial infarction) (CMS/HCC)    • Neuropathy in diabetes (CMS/HCC)    • Onychomycosis          Past Surgical History:   Procedure Laterality Date   • AMPUTATION DIGIT Right 3/5/2017    Procedure: RIGHT AMPUTATION TRANSMETATRASAL , RECESSION GASTROCNEMOUS;  Surgeon: Marco A Thompson DPM;  Location: [facility];  Service:    • CARDIAC DEFIBRILLATOR PLACEMENT  2010, 2016   • CARPAL TUNNEL RELEASE  2015    elbow and wrist left arm   • FOOT IRRIGATION, DEBRIDEMENT AND REPAIR Left 3/7/2018    Procedure: FOOT IRRIGATION, DEBRIDEMENT AND REPAIR;  Surgeon: Marco A Thompson DPM;  Location: [facility];  Service:    • FOOT IRRIGATION, DEBRIDEMENT AND REPAIR Left 3/10/2018    Procedure: FOOT IRRIGATION, DEBRIDEMENT AND REPAIR;  Surgeon: Marco A Thompson DPM;  Location: [facility] [place] [facility];  Service: Podiatry   • FOOT SURGERY     • FOOT WOUND CLOSURE Right 3/2/2017    Procedure: INCISION AND DRAINAGE, RIGHT FOOT;  Surgeon: Tung Rosenthal DPM;  Location: [facility] [place] [facility];  Service:    • HAMMER TOE REPAIR Left 2/15/2018    Procedure: HAMMERTOE CORRECTION LEFT  SECOND, THIRD AND FOURTH TOES AND HALLUX INTERPHALANGEAL JOINT ARTHRODESIS LEFT FOOT (Micro Asnis, 4.0 & 5.0 Asnis)      (c-arm);  Surgeon: Marco A Thompson DPM;  Location: [facility];  Service:    • HARDWARE REMOVAL Left 3/5/2018    Procedure: ANKLE/FOOT HARDWARE REMOVAL;  Surgeon: Marco A Thompson DPM;  Location: [facility] OR;  Service:    • INCISION AND DRAINAGE LEG Left 3/5/2018    Procedure: INCISION AND DRAINAGE LOWER EXTREMITY;  Surgeon: Marco A Thompson DPM;  Location: [facility];  Service:    • TOE AMPUTATION Right 2016   • TONSILECTOMY, ADENOIDECTOMY, BILATERAL MYRINGOTOMY AND TUBES      age 25         Family History   Problem Relation Age of Onset   • Diabetes Father    • Stroke Father    • No Known Problems Maternal Grandmother    • No Known Problems Maternal Grandfather    • No Known Problems Paternal Grandmother    • No Known Problems Paternal Grandfather    • No Known Problems Son    • No Known Problems Daughter    • No Known Problems Daughter          Social History     Socioeconomic History   • Marital status:      Spouse name: Not on file   • Number of children: Not on file   • Years of education: Not on file   • Highest education level: Not on file   Tobacco Use   • Smoking status: Never Smoker   • Smokeless tobacco: Never Used   Substance and Sexual Activity   • Alcohol use: No   • Drug use: No   • Sexual activity: Defer         Current Outpatient Medications   Medication Sig Dispense Refill   • aspirin 81 MG chewable tablet Chew 81 mg Every Night.     • Bacitracin Zinc (MAGIC BUTT OINTMENT) Apply 1 each topically 2 (Two) Times a Day. 120 g 1   • cefdinir (OMNICEF) 300 MG capsule      • Cholecalciferol (VITAMIN D3) 5000 UNITS capsule capsule Take 5,000 Units by mouth Every Night.     • CLONIDINE HCL 5 MCG/ML ORAL SOLN Take  by mouth.     • Cyanocobalamin (VITAMIN B 12 PO) Take 1,000 mcg by mouth Every Night.     • doxycycline (VIBRAMYCIN) 100 MG capsule Take 1 capsule by mouth 2 (Two) Times a Day.  "28 capsule 0   • dronedarone (MULTAQ) 400 MG tablet Take 400 mg by mouth Every Night.     • fluticasone (FLONASE) 50 MCG/ACT nasal spray 2 sprays into each nostril As Needed for Rhinitis.     • glimepiride (AMARYL) 2 MG tablet Take 4 mg by mouth 2 (Two) Times a Day.     • glimepiride (AMARYL) 2 MG tablet TAKE 1 TABLET BY MOUTH 2 (TWO) TIMES DAILY WITH FOOD     • HYDROcodone-acetaminophen (NORCO) 7.5-325 MG per tablet Take 1 tablet by mouth Every 6 (Six) Hours As Needed for Moderate Pain . 30 tablet 0   • losartan (COZAAR) 25 MG tablet      • magnesium oxide (MAGOX) 400 (241.3 Mg) MG tablet tablet Take 400 mg by mouth 2 (Two) Times a Day.     • metFORMIN (GLUCOPHAGE) 1000 MG tablet Take 1,000 mg by mouth 2 (Two) Times a Day With Meals. Patient states he only uses when needed     • metFORMIN (GLUCOPHAGE) 1000 MG tablet TAKE 1 TABLET BY MOUTH 2 (TWO) TIMES DAILY WITH MEALS. INDICATIONS: TYPE 2 DIABETES MELLITUS     • metoprolol succinate XL (TOPROL-XL) 25 MG 24 hr tablet      • metoprolol tartrate (LOPRESSOR) 25 MG tablet Take 12.5 mg by mouth Every Night.     • ONE TOUCH ULTRA TEST test strip USE TO TEST BLOOD SUGAR THREE TIMES A DAY  1   • valsartan (DIOVAN) 160 MG tablet Take 40 mg by mouth Every Night. 1/2 tablet daily      • valsartan-hydrochlorothiazide (DIOVAN-HCT) 160-12.5 MG per tablet Take  by mouth.     • vitamin C (ASCORBIC ACID) 500 MG tablet Take 500 mg by mouth Every Night.     • vitamin E 200 UNIT capsule Take 200 Units by mouth Every Night.       No current facility-administered medications for this visit.            OBJECTIVE    Pulse 77   Ht 190.5 cm (75\")   Wt (!) 142 kg (313 lb 0.9 oz)   SpO2 99%   BMI 39.13 kg/m²     Review of Systems   Constitutional: Negative.    HENT: Negative.    Respiratory: Negative.    Cardiovascular: Negative.    Gastrointestinal: Negative.    Musculoskeletal:        Toe pain    Skin: Negative.    Neurological: Positive for numbness.   Psychiatric/Behavioral: Negative. "              Physical Exam   Constitutional: He is oriented to person, place, and time. He appears well-developed and well-nourished. No distress.   HENT:   Head: Normocephalic and atraumatic.   Nose: Nose normal.   Pulmonary/Chest: Effort normal. No respiratory distress. He has no wheezes.   Neurological: He is alert and oriented to person, place, and time.   Skin: Skin is warm and dry. Capillary refill takes less than 2 seconds.   Psychiatric: He has a normal mood and affect. Judgment normal.   Vitals reviewed.    Left Lower Extremity:     Cardiovascular:    CFT brisk  to all digits  Skin temp is warm to warm from proximal tibia to distal digits  Pedal hair growth diminished.   No erythema or edema noted      Musculoskeletal:  Left hallux slightly abducted  Digits 2 through 5 left are rectus     Dermatological:   Toenails 1 through 5 are thickened, discolored and elongated with subungual debris.  Pain on palpation to the nail plates.  No subcutaneous nodules or masses noted    No open wounds noted    Neurological:   Protective sensation absent        Procedures        ASSESSMENT AND PLAN    Emre was seen today for follow-up and wound check.    Diagnoses and all orders for this visit:    Onychomycosis    Chronic pain of toe of left foot    Type 2 diabetes mellitus with peripheral neuropathy (CMS/HCC)    S/P transmetatarsal amputation of foot, right (CMS/HCC)    Charcot's joint of left foot      - Ulcer remains healed.  We will continue to monitor it closely  - Continue custom diabetic shoes  - Nails 1-5 bilateral were debrided in length and thickness with nail nipper and electric  to decrease fungal load and risk of infection.   - All his questions were answered  - Return to clinic 3 months or sooner if needed             This document has been electronically signed by Marco A Thompson DPM on March 25, 2019 8:17 AM

## 2019-04-10 ENCOUNTER — OFFICE VISIT (OUTPATIENT)
Dept: PODIATRY | Facility: CLINIC | Age: 59
End: 2019-04-10

## 2019-04-10 VITALS — WEIGHT: 313 LBS | HEIGHT: 75 IN | OXYGEN SATURATION: 98 % | BODY MASS INDEX: 38.92 KG/M2 | HEART RATE: 92 BPM

## 2019-04-10 DIAGNOSIS — S90.415A ABRASION OF SECOND TOE OF LEFT FOOT, INITIAL ENCOUNTER: Primary | ICD-10-CM

## 2019-04-10 PROCEDURE — 99213 OFFICE O/P EST LOW 20 MIN: CPT | Performed by: PODIATRIST

## 2019-04-10 NOTE — PROGRESS NOTES
Emre Lisa  1960  59 y.o. male   [place] - 3/29/2019  BS - 93 per pt    Patient presents today with a concern of a spot on his left 2nd toe     04/10/19  Chief Complaint   Patient presents with   • Left Foot - Wound Check   • Right Foot - Callouses       History of Present Illness    Patient presents to clinic today with new sore on top of his left second toe.  He states this started less than 1 week ago.  He does not recall any specific injuries to the toe.  He is not sure what is causing the spot.  He has done nothing to treat it.    Past Medical History:   Diagnosis Date   • Arthritis    • Atrial fibrillation (CMS/HCC)    • Diabetes mellitus (CMS/[facility])    • Diabetic foot ulcer associated with type 2 diabetes mellitus (CMS/HCC)     left great toe   • Hallux malleus of left foot    • Hammer toe    • Hypertension    • MI (myocardial infarction) (CMS/HCC)    • Neuropathy in diabetes (CMS/HCC)    • Onychomycosis          Past Surgical History:   Procedure Laterality Date   • AMPUTATION DIGIT Right 3/5/2017    Procedure: RIGHT AMPUTATION TRANSMETATRASAL , RECESSION GASTROCNEMOUS;  Surgeon: Marco A Thompson DPM;  Location: [facility];  Service:    • CARDIAC DEFIBRILLATOR PLACEMENT  2010, 2016   • CARPAL TUNNEL RELEASE  2015    elbow and wrist left arm   • FOOT IRRIGATION, DEBRIDEMENT AND REPAIR Left 3/7/2018    Procedure: FOOT IRRIGATION, DEBRIDEMENT AND REPAIR;  Surgeon: Marco A Thompson DPM;  Location: [facility];  Service:    • FOOT IRRIGATION, DEBRIDEMENT AND REPAIR Left 3/10/2018    Procedure: FOOT IRRIGATION, DEBRIDEMENT AND REPAIR;  Surgeon: Marco A Thompson DPM;  Location: [facility] OR;  Service: Podiatry   • FOOT SURGERY     • FOOT WOUND CLOSURE Right 3/2/2017    Procedure: INCISION AND DRAINAGE, RIGHT FOOT;  Surgeon: Tung Rosenthal DPM;  Location: [facility];  Service:    • HAMMER TOE REPAIR Left 2/15/2018    Procedure: HAMMERTOE CORRECTION LEFT SECOND, THIRD AND FOURTH TOES AND HALLUX INTERPHALANGEAL  JOINT ARTHRODESIS LEFT FOOT (Micro Asnis, 4.0 & 5.0 Asnis)      (c-arm);  Surgeon: Marco A Thompson DPM;  Location: [facility] OR;  Service:    • HARDWARE REMOVAL Left 3/5/2018    Procedure: ANKLE/FOOT HARDWARE REMOVAL;  Surgeon: Marco A Thompson DPM;  Location: [facility] OR;  Service:    • INCISION AND DRAINAGE LEG Left 3/5/2018    Procedure: INCISION AND DRAINAGE LOWER EXTREMITY;  Surgeon: Marco A Thompson DPM;  Location: [facility] OR;  Service:    • TOE AMPUTATION Right 2016   • TONSILECTOMY, ADENOIDECTOMY, BILATERAL MYRINGOTOMY AND TUBES      age 25         Family History   Problem Relation Age of Onset   • Diabetes Father    • Stroke Father    • No Known Problems Maternal Grandmother    • No Known Problems Maternal Grandfather    • No Known Problems Paternal Grandmother    • No Known Problems Paternal Grandfather    • No Known Problems Son    • No Known Problems Daughter    • No Known Problems Daughter          Social History     Socioeconomic History   • Marital status:      Spouse name: Not on file   • Number of children: Not on file   • Years of education: Not on file   • Highest education level: Not on file   Tobacco Use   • Smoking status: Never Smoker   • Smokeless tobacco: Never Used   Substance and Sexual Activity   • Alcohol use: No   • Drug use: No   • Sexual activity: Defer         Current Outpatient Medications   Medication Sig Dispense Refill   • aspirin 81 MG chewable tablet Chew 81 mg Every Night.     • Bacitracin Zinc (MAGIC BUTT OINTMENT) Apply 1 each topically 2 (Two) Times a Day. 120 g 1   • cefdinir (OMNICEF) 300 MG capsule      • Cholecalciferol (VITAMIN D3) 5000 UNITS capsule capsule Take 5,000 Units by mouth Every Night.     • CLONIDINE HCL 5 MCG/ML ORAL SOLN Take  by mouth.     • Cyanocobalamin (VITAMIN B 12 PO) Take 1,000 mcg by mouth Every Night.     • doxycycline (VIBRAMYCIN) 100 MG capsule Take 1 capsule by mouth 2 (Two) Times a Day. 28 capsule 0   • dronedarone (MULTAQ) 400 MG tablet Take  "400 mg by mouth Every Night.     • fluticasone (FLONASE) 50 MCG/ACT nasal spray 2 sprays into each nostril As Needed for Rhinitis.     • glimepiride (AMARYL) 2 MG tablet Take 4 mg by mouth 2 (Two) Times a Day.     • glimepiride (AMARYL) 2 MG tablet TAKE 1 TABLET BY MOUTH 2 (TWO) TIMES DAILY WITH FOOD     • HYDROcodone-acetaminophen (NORCO) 7.5-325 MG per tablet Take 1 tablet by mouth Every 6 (Six) Hours As Needed for Moderate Pain . 30 tablet 0   • losartan (COZAAR) 25 MG tablet      • magnesium oxide (MAGOX) 400 (241.3 Mg) MG tablet tablet Take 400 mg by mouth 2 (Two) Times a Day.     • metFORMIN (GLUCOPHAGE) 1000 MG tablet Take 1,000 mg by mouth 2 (Two) Times a Day With Meals. Patient states he only uses when needed     • metFORMIN (GLUCOPHAGE) 1000 MG tablet TAKE 1 TABLET BY MOUTH 2 (TWO) TIMES DAILY WITH MEALS. INDICATIONS: TYPE 2 DIABETES MELLITUS     • metoprolol succinate XL (TOPROL-XL) 25 MG 24 hr tablet      • metoprolol tartrate (LOPRESSOR) 25 MG tablet Take 12.5 mg by mouth Every Night.     • ONE TOUCH ULTRA TEST test strip USE TO TEST BLOOD SUGAR THREE TIMES A DAY  1   • valsartan (DIOVAN) 160 MG tablet Take 40 mg by mouth Every Night. 1/2 tablet daily      • valsartan-hydrochlorothiazide (DIOVAN-HCT) 160-12.5 MG per tablet Take  by mouth.     • vitamin C (ASCORBIC ACID) 500 MG tablet Take 500 mg by mouth Every Night.     • vitamin E 200 UNIT capsule Take 200 Units by mouth Every Night.       No current facility-administered medications for this visit.            OBJECTIVE    Pulse 92   Ht 190.5 cm (75\")   Wt (!) 142 kg (313 lb)   SpO2 98%   BMI 39.12 kg/m²     Review of Systems   Constitutional: Negative.    HENT: Negative.    Eyes: Negative.    Respiratory: Negative.    Gastrointestinal: Negative.    Musculoskeletal:        Toe pain    Skin: Positive for wound.   Neurological: Positive for numbness.   Psychiatric/Behavioral: Negative.              Physical Exam   Constitutional: He is oriented to " person, place, and time. He appears well-developed and well-nourished. No distress.   HENT:   Head: Normocephalic and atraumatic.   Pulmonary/Chest: Effort normal. No respiratory distress. He has no wheezes.   Neurological: He is alert and oriented to person, place, and time.   Skin: Skin is warm and dry. Capillary refill takes less than 2 seconds.   Psychiatric: He has a normal mood and affect. Judgment normal.   Vitals reviewed.    Left Lower Extremity:     Cardiovascular:    CFT brisk  to all digits  Skin temp is warm to warm from proximal tibia to distal digits  Pedal hair growth diminished.   No erythema or edema noted      Musculoskeletal:  Left hallux slightly abducted  Digits 2 through 5 left are rectus     Dermatological:   Toenails 1 through 5 are thickened, discolored   No subcutaneous nodules or masses noted    Small abrasion noted to the dorsal PIPJ of the left second toe.  No signs of infection.    Neurological:   Protective sensation absent        Procedures        ASSESSMENT AND PLAN    Emre was seen today for wound check and callouses.    Diagnoses and all orders for this visit:    Abrasion of second toe of left foot, initial encounter      -New abrasion to left second toe.  Patient to start dressing with medihoney and a Band-Aid.  Dispensed toe sleeve.  - All his questions were answered  - Return to clinic 2 weeks as needed          This document has been electronically signed by Marco A Thompson DPM on April 10, 2019 12:22 PM

## 2019-06-03 ENCOUNTER — OFFICE VISIT (OUTPATIENT)
Dept: PODIATRY | Facility: CLINIC | Age: 59
End: 2019-06-03

## 2019-06-03 VITALS — BODY MASS INDEX: 38.92 KG/M2 | OXYGEN SATURATION: 99 % | HEIGHT: 75 IN | HEART RATE: 106 BPM | WEIGHT: 313 LBS

## 2019-06-03 DIAGNOSIS — E11.621 DIABETIC ULCER OF LEFT MIDFOOT ASSOCIATED WITH TYPE 2 DIABETES MELLITUS, WITH FAT LAYER EXPOSED (HCC): ICD-10-CM

## 2019-06-03 DIAGNOSIS — L97.422 DIABETIC ULCER OF LEFT MIDFOOT ASSOCIATED WITH TYPE 2 DIABETES MELLITUS, WITH FAT LAYER EXPOSED (HCC): ICD-10-CM

## 2019-06-03 DIAGNOSIS — M14.672 CHARCOT'S JOINT OF LEFT FOOT: Primary | ICD-10-CM

## 2019-06-03 PROCEDURE — 99213 OFFICE O/P EST LOW 20 MIN: CPT | Performed by: PODIATRIST

## 2019-06-03 PROCEDURE — 11042 DBRDMT SUBQ TIS 1ST 20SQCM/<: CPT | Performed by: PODIATRIST

## 2019-06-03 NOTE — PROGRESS NOTES
Emre Bergeron Sloan  1960  59 y.o. male   [place] - 3/29/2019  BS - 104 per pt    Patient presents today with a complaint of an open wound on the bottom of his left foot.    06/04/19  Chief Complaint   Patient presents with   • Left Foot - Wound Check       History of Present Illness    Patient presents to clinic with chief complaint of reopened ulcer on the bottom of his left foot.  Patient states that a few days ago he reopened the ulcer on the bottom of his left foot getting out of the shower.  He did notice blood on the floor.  He has been dressing it daily.  He denies pain.  He has no      Past Medical History:   Diagnosis Date   • Arthritis    • Atrial fibrillation (CMS/HCC)    • Diabetes mellitus (CMS/HCC)    • Diabetic foot ulcer associated with type 2 diabetes mellitus (CMS/HCC)     left great toe   • Hallux malleus of left foot    • Hammer toe    • Hypertension    • MI (myocardial infarction) (CMS/HCC)    • Neuropathy in diabetes (CMS/HCC)    • Onychomycosis          Past Surgical History:   Procedure Laterality Date   • AMPUTATION DIGIT Right 3/5/2017    Procedure: RIGHT AMPUTATION TRANSMETATRASAL , RECESSION GASTROCNEMOUS;  Surgeon: Marco A Thompson DPM;  Location: [facility];  Service:    • CARDIAC DEFIBRILLATOR PLACEMENT  2010, 2016   • CARPAL TUNNEL RELEASE  2015    elbow and wrist left arm   • FOOT IRRIGATION, DEBRIDEMENT AND REPAIR Left 3/7/2018    Procedure: FOOT IRRIGATION, DEBRIDEMENT AND REPAIR;  Surgeon: Marco A Thompson DPM;  Location: [facility] OR;  Service:    • FOOT IRRIGATION, DEBRIDEMENT AND REPAIR Left 3/10/2018    Procedure: FOOT IRRIGATION, DEBRIDEMENT AND REPAIR;  Surgeon: Marco A Thompson DPM;  Location: [facility] OR;  Service: Podiatry   • FOOT SURGERY     • FOOT WOUND CLOSURE Right 3/2/2017    Procedure: INCISION AND DRAINAGE, RIGHT FOOT;  Surgeon: Tung Rosenthal DPM;  Location: [facility] OR;  Service:    • HAMMER TOE REPAIR Left 2/15/2018    Procedure: HAMMERTOE CORRECTION LEFT SECOND,  THIRD AND FOURTH TOES AND HALLUX INTERPHALANGEAL JOINT ARTHRODESIS LEFT FOOT (Micro Asnis, 4.0 & 5.0 Asnis)      (c-arm);  Surgeon: Marco A Thompson DPM;  Location: [facility];  Service:    • HARDWARE REMOVAL Left 3/5/2018    Procedure: ANKLE/FOOT HARDWARE REMOVAL;  Surgeon: Marco A Thompson DPM;  Location: [facility] OR;  Service:    • INCISION AND DRAINAGE LEG Left 3/5/2018    Procedure: INCISION AND DRAINAGE LOWER EXTREMITY;  Surgeon: Marco A Thompson DPM;  Location: [facility];  Service:    • TOE AMPUTATION Right 2016   • TONSILECTOMY, ADENOIDECTOMY, BILATERAL MYRINGOTOMY AND TUBES      age 25         Family History   Problem Relation Age of Onset   • Diabetes Father    • Stroke Father    • No Known Problems Maternal Grandmother    • No Known Problems Maternal Grandfather    • No Known Problems Paternal Grandmother    • No Known Problems Paternal Grandfather    • No Known Problems Son    • No Known Problems Daughter    • No Known Problems Daughter          Social History     Socioeconomic History   • Marital status:      Spouse name: Not on file   • Number of children: Not on file   • Years of education: Not on file   • Highest education level: Not on file   Tobacco Use   • Smoking status: Never Smoker   • Smokeless tobacco: Never Used   Substance and Sexual Activity   • Alcohol use: No   • Drug use: No   • Sexual activity: Defer         Current Outpatient Medications   Medication Sig Dispense Refill   • aspirin 81 MG chewable tablet Chew 81 mg Every Night.     • Bacitracin Zinc (MAGIC BUTT OINTMENT) Apply 1 each topically 2 (Two) Times a Day. 120 g 1   • cefdinir (OMNICEF) 300 MG capsule      • Cholecalciferol (VITAMIN D3) 5000 UNITS capsule capsule Take 5,000 Units by mouth Every Night.     • CLONIDINE HCL 5 MCG/ML ORAL SOLN Take  by mouth.     • Cyanocobalamin (VITAMIN B 12 PO) Take 1,000 mcg by mouth Every Night.     • doxycycline (VIBRAMYCIN) 100 MG capsule Take 1 capsule by mouth 2 (Two) Times a Day. 28  "capsule 0   • dronedarone (MULTAQ) 400 MG tablet Take 400 mg by mouth Every Night.     • fluticasone (FLONASE) 50 MCG/ACT nasal spray 2 sprays into each nostril As Needed for Rhinitis.     • glimepiride (AMARYL) 2 MG tablet Take 4 mg by mouth 2 (Two) Times a Day.     • glimepiride (AMARYL) 2 MG tablet TAKE 1 TABLET BY MOUTH 2 (TWO) TIMES DAILY WITH FOOD     • HYDROcodone-acetaminophen (NORCO) 7.5-325 MG per tablet Take 1 tablet by mouth Every 6 (Six) Hours As Needed for Moderate Pain . 30 tablet 0   • losartan (COZAAR) 25 MG tablet      • magnesium oxide (MAGOX) 400 (241.3 Mg) MG tablet tablet Take 400 mg by mouth 2 (Two) Times a Day.     • metFORMIN (GLUCOPHAGE) 1000 MG tablet Take 1,000 mg by mouth 2 (Two) Times a Day With Meals. Patient states he only uses when needed     • metFORMIN (GLUCOPHAGE) 1000 MG tablet TAKE 1 TABLET BY MOUTH 2 (TWO) TIMES DAILY WITH MEALS. INDICATIONS: TYPE 2 DIABETES MELLITUS     • metoprolol succinate XL (TOPROL-XL) 25 MG 24 hr tablet      • metoprolol tartrate (LOPRESSOR) 25 MG tablet Take 12.5 mg by mouth Every Night.     • ONE TOUCH ULTRA TEST test strip USE TO TEST BLOOD SUGAR THREE TIMES A DAY  1   • valsartan (DIOVAN) 160 MG tablet Take 40 mg by mouth Every Night. 1/2 tablet daily      • valsartan-hydrochlorothiazide (DIOVAN-HCT) 160-12.5 MG per tablet Take  by mouth.     • vitamin C (ASCORBIC ACID) 500 MG tablet Take 500 mg by mouth Every Night.     • vitamin E 200 UNIT capsule Take 200 Units by mouth Every Night.       No current facility-administered medications for this visit.            OBJECTIVE    Pulse 106   Ht 190.5 cm (75\")   Wt (!) 142 kg (313 lb)   SpO2 99%   BMI 39.12 kg/m²     Review of Systems   Constitutional: Negative.    HENT: Negative.    Eyes: Negative.    Respiratory: Negative.    Cardiovascular: Negative.    Gastrointestinal: Negative.    Genitourinary: Negative.    Musculoskeletal: Negative.    Skin: Positive for wound.   Neurological: Positive for " numbness.             Physical Exam   Constitutional: He is oriented to person, place, and time. He appears well-developed and well-nourished. No distress.   HENT:   Head: Normocephalic and atraumatic.   Pulmonary/Chest: Effort normal. No respiratory distress. He has no wheezes.   Neurological: He is alert and oriented to person, place, and time.   Skin: Skin is warm and dry. Capillary refill takes less than 2 seconds.   Psychiatric: He has a normal mood and affect. Judgment normal.   Vitals reviewed.    Left Lower Extremity:     Cardiovascular:    CFT brisk  to all digits  Skin temp is warm to warm from proximal tibia to distal digits  Pedal hair growth diminished.   No erythema or edema noted      Musculoskeletal:  Left hallux slightly abducted  Digits 2 through 5 left are rectus     Dermatological:   Toenails 1 through 5 are thickened, discolored   No subcutaneous nodules or masses noted    New ulcer to plantar lateral left foot.  Pre-debridement the ulcer measures 2.0 x 0.5 x 0.2 cm.  Slough macerated tissue noted.  No surrounding erythema, foul odor or purulent drainage.  Does not probe to bone.    Neurological:   Protective sensation absent        Procedures        ASSESSMENT AND PLAN    Emre was seen today for wound check.    Diagnoses and all orders for this visit:    Charcot's joint of left foot    Diabetic ulcer of left midfoot associated with type 2 diabetes mellitus, with fat layer exposed (CMS/HCC)      -Ulcer to plantar lateral left foot has reopened.  Excisional debridement performed down to and including subcutaneous tissue with a dermal curette.  Posterior measurements are 3.0 x 1.0 x 0.5 cm.  Ulcer was dressed.  Patient instructed on daily dressing changes.  Dispensed offloading surgical boot.  Weightbearing as tolerated in boot only.  - All his questions were answered  - Return to clinic 1 week          This document has been electronically signed by Marco A Thompson DPM on June 4, 2019 5:37 PM

## 2019-06-05 ENCOUNTER — TRANSCRIBE ORDERS (OUTPATIENT)
Dept: LAB | Facility: HOSPITAL | Age: 59
End: 2019-06-05

## 2019-06-05 DIAGNOSIS — Z00.00 PREVENTATIVE HEALTH CARE: ICD-10-CM

## 2019-06-05 DIAGNOSIS — I10 ESSENTIAL HYPERTENSION: ICD-10-CM

## 2019-06-05 DIAGNOSIS — E55.9 VITAMIN D DEFICIENCY, UNSPECIFIED: Primary | ICD-10-CM

## 2019-06-05 DIAGNOSIS — Z12.5 SPECIAL SCREENING, PROSTATE CANCER: ICD-10-CM

## 2019-06-05 DIAGNOSIS — E10.42 DIABETIC POLYNEUROPATHY ASSOCIATED WITH TYPE 1 DIABETES MELLITUS (HCC): ICD-10-CM

## 2019-06-06 ENCOUNTER — APPOINTMENT (OUTPATIENT)
Dept: LAB | Facility: HOSPITAL | Age: 59
End: 2019-06-06

## 2019-06-10 ENCOUNTER — OFFICE VISIT (OUTPATIENT)
Dept: PODIATRY | Facility: CLINIC | Age: 59
End: 2019-06-10

## 2019-06-10 VITALS — HEIGHT: 75 IN | BODY MASS INDEX: 38.92 KG/M2 | OXYGEN SATURATION: 99 % | WEIGHT: 313 LBS | HEART RATE: 94 BPM

## 2019-06-10 DIAGNOSIS — E11.621 DIABETIC ULCER OF LEFT MIDFOOT ASSOCIATED WITH TYPE 2 DIABETES MELLITUS, WITH FAT LAYER EXPOSED (HCC): Primary | ICD-10-CM

## 2019-06-10 DIAGNOSIS — L97.422 DIABETIC ULCER OF LEFT MIDFOOT ASSOCIATED WITH TYPE 2 DIABETES MELLITUS, WITH FAT LAYER EXPOSED (HCC): Primary | ICD-10-CM

## 2019-06-10 DIAGNOSIS — M14.672 CHARCOT'S JOINT OF LEFT FOOT: ICD-10-CM

## 2019-06-10 PROCEDURE — 11042 DBRDMT SUBQ TIS 1ST 20SQCM/<: CPT | Performed by: PODIATRIST

## 2019-06-17 ENCOUNTER — OFFICE VISIT (OUTPATIENT)
Dept: PODIATRY | Facility: CLINIC | Age: 59
End: 2019-06-17

## 2019-06-17 VITALS — BODY MASS INDEX: 38.92 KG/M2 | WEIGHT: 313 LBS | HEIGHT: 75 IN | HEART RATE: 80 BPM | OXYGEN SATURATION: 99 %

## 2019-06-17 DIAGNOSIS — M14.672 CHARCOT'S JOINT OF LEFT FOOT: ICD-10-CM

## 2019-06-17 DIAGNOSIS — L97.422 DIABETIC ULCER OF LEFT MIDFOOT ASSOCIATED WITH TYPE 2 DIABETES MELLITUS, WITH FAT LAYER EXPOSED (HCC): Primary | ICD-10-CM

## 2019-06-17 DIAGNOSIS — E11.621 DIABETIC ULCER OF LEFT MIDFOOT ASSOCIATED WITH TYPE 2 DIABETES MELLITUS, WITH FAT LAYER EXPOSED (HCC): Primary | ICD-10-CM

## 2019-06-17 PROCEDURE — 11042 DBRDMT SUBQ TIS 1ST 20SQCM/<: CPT | Performed by: PODIATRIST

## 2019-06-17 RX ORDER — ROSUVASTATIN CALCIUM 10 MG/1
TABLET, COATED ORAL
COMMUNITY
Start: 2019-06-13 | End: 2019-11-11

## 2019-06-17 NOTE — PROGRESS NOTES
Emre Lisa  1960  59 y.o. male   [place] - 6/13/2019  A1c 5.6 (6/10/2019)    Patient presents today for a recheck of his left foot.    06/17/19  Chief Complaint   Patient presents with   • Left Foot - Follow-up, Foot Ulcer       History of Present Illness    Patient presents to clinic for follow-up of his left foot ulcer.  No new complaints.      Past Medical History:   Diagnosis Date   • Arthritis    • Atrial fibrillation (CMS/HCC)    • Diabetes mellitus (CMS/[facility])    • Diabetic foot ulcer associated with type 2 diabetes mellitus (CMS/HCC)     left great toe   • Hallux malleus of left foot    • Hammer toe    • Hypertension    • MI (myocardial infarction) (CMS/[facility])    • Neuropathy in diabetes (CMS/[facility])    • Onychomycosis          Past Surgical History:   Procedure Laterality Date   • AMPUTATION DIGIT Right 3/5/2017    Procedure: RIGHT AMPUTATION TRANSMETATRASAL , RECESSION GASTROCNEMOUS;  Surgeon: Marco A Thompson DPM;  Location: [facility];  Service:    • CARDIAC DEFIBRILLATOR PLACEMENT  2010, 2016   • CARPAL TUNNEL RELEASE  2015    elbow and wrist left arm   • FOOT IRRIGATION, DEBRIDEMENT AND REPAIR Left 3/7/2018    Procedure: FOOT IRRIGATION, DEBRIDEMENT AND REPAIR;  Surgeon: Marco A Thompson DPM;  Location: [facility] OR;  Service:    • FOOT IRRIGATION, DEBRIDEMENT AND REPAIR Left 3/10/2018    Procedure: FOOT IRRIGATION, DEBRIDEMENT AND REPAIR;  Surgeon: Marco A Thompson DPM;  Location: [facility];  Service: Podiatry   • FOOT SURGERY     • FOOT WOUND CLOSURE Right 3/2/2017    Procedure: INCISION AND DRAINAGE, RIGHT FOOT;  Surgeon: Tung Rosenthal DPM;  Location: [facility] OR;  Service:    • HAMMER TOE REPAIR Left 2/15/2018    Procedure: HAMMERTOE CORRECTION LEFT SECOND, THIRD AND FOURTH TOES AND HALLUX INTERPHALANGEAL JOINT ARTHRODESIS LEFT FOOT (Micro Asnis, 4.0 & 5.0 Asnis)      (c-arm);  Surgeon: Marco A Thompson DPM;  Location: [facility] OR;  Service:    • HARDWARE REMOVAL Left 3/5/2018    Procedure:  ANKLE/FOOT HARDWARE REMOVAL;  Surgeon: Marco A Thompson DPM;  Location: [facility];  Service:    • INCISION AND DRAINAGE LEG Left 3/5/2018    Procedure: INCISION AND DRAINAGE LOWER EXTREMITY;  Surgeon: Marco A Thompson DPM;  Location: [facility];  Service:    • TOE AMPUTATION Right 2016   • TONSILECTOMY, ADENOIDECTOMY, BILATERAL MYRINGOTOMY AND TUBES      age 25         Family History   Problem Relation Age of Onset   • Diabetes Father    • Stroke Father    • No Known Problems Maternal Grandmother    • No Known Problems Maternal Grandfather    • No Known Problems Paternal Grandmother    • No Known Problems Paternal Grandfather    • No Known Problems Son    • No Known Problems Daughter    • No Known Problems Daughter          Social History     Socioeconomic History   • Marital status:      Spouse name: Not on file   • Number of children: Not on file   • Years of education: Not on file   • Highest education level: Not on file   Tobacco Use   • Smoking status: Never Smoker   • Smokeless tobacco: Never Used   Substance and Sexual Activity   • Alcohol use: No   • Drug use: No   • Sexual activity: Defer         Current Outpatient Medications   Medication Sig Dispense Refill   • aspirin 81 MG chewable tablet Chew 81 mg Every Night.     • Bacitracin Zinc (MAGIC BUTT OINTMENT) Apply 1 each topically 2 (Two) Times a Day. 120 g 1   • cefdinir (OMNICEF) 300 MG capsule      • Cholecalciferol (VITAMIN D3) 5000 UNITS capsule capsule Take 5,000 Units by mouth Every Night.     • CLONIDINE HCL 5 MCG/ML ORAL SOLN Take  by mouth.     • Cyanocobalamin (VITAMIN B 12 PO) Take 1,000 mcg by mouth Every Night.     • doxycycline (VIBRAMYCIN) 100 MG capsule Take 1 capsule by mouth 2 (Two) Times a Day. 28 capsule 0   • dronedarone (MULTAQ) 400 MG tablet Take 400 mg by mouth Every Night.     • fluticasone (FLONASE) 50 MCG/ACT nasal spray 2 sprays into each nostril As Needed for Rhinitis.     • glimepiride (AMARYL) 2 MG tablet Take 4 mg by  "mouth 2 (Two) Times a Day.     • glimepiride (AMARYL) 2 MG tablet TAKE 1 TABLET BY MOUTH 2 (TWO) TIMES DAILY WITH FOOD     • HYDROcodone-acetaminophen (NORCO) 7.5-325 MG per tablet Take 1 tablet by mouth Every 6 (Six) Hours As Needed for Moderate Pain . 30 tablet 0   • losartan (COZAAR) 25 MG tablet      • magnesium oxide (MAGOX) 400 (241.3 Mg) MG tablet tablet Take 400 mg by mouth 2 (Two) Times a Day.     • metFORMIN (GLUCOPHAGE) 1000 MG tablet Take 1,000 mg by mouth 2 (Two) Times a Day With Meals. Patient states he only uses when needed     • metFORMIN (GLUCOPHAGE) 1000 MG tablet TAKE 1 TABLET BY MOUTH 2 (TWO) TIMES DAILY WITH MEALS. INDICATIONS: TYPE 2 DIABETES MELLITUS     • metoprolol succinate XL (TOPROL-XL) 25 MG 24 hr tablet      • metoprolol tartrate (LOPRESSOR) 25 MG tablet Take 12.5 mg by mouth Every Night.     • ONE TOUCH ULTRA TEST test strip USE TO TEST BLOOD SUGAR THREE TIMES A DAY  1   • valsartan (DIOVAN) 160 MG tablet Take 40 mg by mouth Every Night. 1/2 tablet daily      • valsartan-hydrochlorothiazide (DIOVAN-HCT) 160-12.5 MG per tablet Take  by mouth.     • vitamin C (ASCORBIC ACID) 500 MG tablet Take 500 mg by mouth Every Night.     • vitamin E 200 UNIT capsule Take 200 Units by mouth Every Night.     • rosuvastatin (CRESTOR) 10 MG tablet        No current facility-administered medications for this visit.            OBJECTIVE    Pulse 80   Ht 190.5 cm (75\")   Wt (!) 142 kg (313 lb)   SpO2 99%   BMI 39.12 kg/m²     Review of Systems   Constitutional: Negative.    HENT: Negative.    Eyes: Negative.    Respiratory: Negative.    Cardiovascular: Negative.    Gastrointestinal: Negative.    Endocrine: Negative.    Genitourinary: Negative.    Musculoskeletal: Negative.    Skin: Positive for wound.   Neurological: Positive for numbness.             Physical Exam   Constitutional: He is oriented to person, place, and time. He appears well-developed and well-nourished. No distress.   HENT:   Head: " Normocephalic and atraumatic.   Pulmonary/Chest: Effort normal. No respiratory distress. He has no wheezes.   Neurological: He is alert and oriented to person, place, and time.   Skin: Skin is warm and dry. Capillary refill takes less than 2 seconds.   Psychiatric: He has a normal mood and affect. Judgment normal.   Vitals reviewed.    Left Lower Extremity:     Cardiovascular:    CFT brisk  to all digits  Skin temp is warm to warm from proximal tibia to distal digits  Pedal hair growth diminished.   No erythema or edema noted      Musculoskeletal:  Left hallux slightly abducted  Digits 2 through 5 left are rectus     Dermatological:   Toenails 1 through 5 are thickened, discolored   No subcutaneous nodules or masses noted    Ulcer to plantar lateral left foot  Measures 0.8 x 0.4 x 0.1 cm.  Base is fibrotic and granular.  No signs of infection.  Does not probe to bone.    Neurological:   Protective sensation absent        Procedures        ASSESSMENT AND PLAN    Emre was seen today for follow-up and foot ulcer.    Diagnoses and all orders for this visit:    Diabetic ulcer of left midfoot associated with type 2 diabetes mellitus, with fat layer exposed (CMS/HCC)    Charcot's joint of left foot      -Ulcer to plantar left foot was selectively debrided with a #15 blade.  Post debridement measurements are 0.8 x 0.4 x 0.1 cm.  Ulcer was dressed.  Patient instructed on daily dressing changes.  Weightbearing as tolerated in boot only.  - All his questions were answered  - Return to clinic 1 week          This document has been electronically signed by Marco A Thompson DPM on June 17, 2019 11:56 AM

## 2019-06-26 ENCOUNTER — OFFICE VISIT (OUTPATIENT)
Dept: PODIATRY | Facility: CLINIC | Age: 59
End: 2019-06-26

## 2019-06-26 VITALS — HEIGHT: 75 IN | HEART RATE: 97 BPM | OXYGEN SATURATION: 98 % | WEIGHT: 313 LBS | BODY MASS INDEX: 38.92 KG/M2

## 2019-06-26 DIAGNOSIS — L97.422 DIABETIC ULCER OF LEFT MIDFOOT ASSOCIATED WITH TYPE 2 DIABETES MELLITUS, WITH FAT LAYER EXPOSED (HCC): Primary | ICD-10-CM

## 2019-06-26 DIAGNOSIS — E11.621 DIABETIC ULCER OF LEFT MIDFOOT ASSOCIATED WITH TYPE 2 DIABETES MELLITUS, WITH FAT LAYER EXPOSED (HCC): Primary | ICD-10-CM

## 2019-06-26 DIAGNOSIS — M14.672 CHARCOT'S JOINT OF LEFT FOOT: ICD-10-CM

## 2019-06-26 PROCEDURE — 11042 DBRDMT SUBQ TIS 1ST 20SQCM/<: CPT | Performed by: PODIATRIST

## 2019-06-26 NOTE — PROGRESS NOTES
Emre Lisa  1960  59 y.o. male   [place] - 6/13/2019  A1c 5.6 (6/10/2019)  BS: 88 per patient this morning     Patient presents today for a recheck of his left foot.    06/26/19  Chief Complaint   Patient presents with   • Left Foot - Follow-up, Foot Ulcer       History of Present Illness    Patient presents to clinic for follow-up of his left foot ulcer.  No new complaints.      Past Medical History:   Diagnosis Date   • Arthritis    • Atrial fibrillation (CMS/HCC)    • Diabetes mellitus (CMS/HCC)    • Diabetic foot ulcer associated with type 2 diabetes mellitus (CMS/HCC)     left great toe   • Hallux malleus of left foot    • Hammer toe    • Hypertension    • MI (myocardial infarction) (CMS/HCC)    • Neuropathy in diabetes (CMS/HCC)    • Onychomycosis          Past Surgical History:   Procedure Laterality Date   • AMPUTATION DIGIT Right 3/5/2017    Procedure: RIGHT AMPUTATION TRANSMETATRASAL , RECESSION GASTROCNEMOUS;  Surgeon: Marco A Thompson DPM;  Location: [facility];  Service:    • CARDIAC DEFIBRILLATOR PLACEMENT  2010, 2016   • CARPAL TUNNEL RELEASE  2015    elbow and wrist left arm   • FOOT IRRIGATION, DEBRIDEMENT AND REPAIR Left 3/7/2018    Procedure: FOOT IRRIGATION, DEBRIDEMENT AND REPAIR;  Surgeon: Marco A Thompson DPM;  Location: [facility];  Service:    • FOOT IRRIGATION, DEBRIDEMENT AND REPAIR Left 3/10/2018    Procedure: FOOT IRRIGATION, DEBRIDEMENT AND REPAIR;  Surgeon: Marco A Thompson DPM;  Location: [facility];  Service: Podiatry   • FOOT SURGERY     • FOOT WOUND CLOSURE Right 3/2/2017    Procedure: INCISION AND DRAINAGE, RIGHT FOOT;  Surgeon: Tung Rosenthal DPM;  Location: [facility] OR;  Service:    • HAMMER TOE REPAIR Left 2/15/2018    Procedure: HAMMERTOE CORRECTION LEFT SECOND, THIRD AND FOURTH TOES AND HALLUX INTERPHALANGEAL JOINT ARTHRODESIS LEFT FOOT (Micro Asnis, 4.0 & 5.0 Asnis)      (c-arm);  Surgeon: Marco A Thompson DPM;  Location: [facility];  Service:    • HARDWARE REMOVAL  Left 3/5/2018    Procedure: ANKLE/FOOT HARDWARE REMOVAL;  Surgeon: Marco A Thompson DPM;  Location: [facility] OR;  Service:    • INCISION AND DRAINAGE LEG Left 3/5/2018    Procedure: INCISION AND DRAINAGE LOWER EXTREMITY;  Surgeon: Marco A Thompson DPM;  Location: [facility];  Service:    • TOE AMPUTATION Right 2016   • TONSILECTOMY, ADENOIDECTOMY, BILATERAL MYRINGOTOMY AND TUBES      age 25         Family History   Problem Relation Age of Onset   • Diabetes Father    • Stroke Father    • No Known Problems Maternal Grandmother    • No Known Problems Maternal Grandfather    • No Known Problems Paternal Grandmother    • No Known Problems Paternal Grandfather    • No Known Problems Son    • No Known Problems Daughter    • No Known Problems Daughter          Social History     Socioeconomic History   • Marital status:      Spouse name: Not on file   • Number of children: Not on file   • Years of education: Not on file   • Highest education level: Not on file   Tobacco Use   • Smoking status: Never Smoker   • Smokeless tobacco: Never Used   Substance and Sexual Activity   • Alcohol use: No   • Drug use: No   • Sexual activity: Defer         Current Outpatient Medications   Medication Sig Dispense Refill   • aspirin 81 MG chewable tablet Chew 81 mg Every Night.     • Bacitracin Zinc (MAGIC BUTT OINTMENT) Apply 1 each topically 2 (Two) Times a Day. 120 g 1   • cefdinir (OMNICEF) 300 MG capsule      • Cholecalciferol (VITAMIN D3) 5000 UNITS capsule capsule Take 5,000 Units by mouth Every Night.     • CLONIDINE HCL 5 MCG/ML ORAL SOLN Take  by mouth.     • Cyanocobalamin (VITAMIN B 12 PO) Take 1,000 mcg by mouth Every Night.     • doxycycline (VIBRAMYCIN) 100 MG capsule Take 1 capsule by mouth 2 (Two) Times a Day. 28 capsule 0   • dronedarone (MULTAQ) 400 MG tablet Take 400 mg by mouth Every Night.     • fluticasone (FLONASE) 50 MCG/ACT nasal spray 2 sprays into each nostril As Needed for Rhinitis.     • glimepiride (AMARYL)  "2 MG tablet Take 4 mg by mouth 2 (Two) Times a Day.     • glimepiride (AMARYL) 2 MG tablet TAKE 1 TABLET BY MOUTH 2 (TWO) TIMES DAILY WITH FOOD     • HYDROcodone-acetaminophen (NORCO) 7.5-325 MG per tablet Take 1 tablet by mouth Every 6 (Six) Hours As Needed for Moderate Pain . 30 tablet 0   • losartan (COZAAR) 25 MG tablet      • magnesium oxide (MAGOX) 400 (241.3 Mg) MG tablet tablet Take 400 mg by mouth 2 (Two) Times a Day.     • metFORMIN (GLUCOPHAGE) 1000 MG tablet Take 1,000 mg by mouth 2 (Two) Times a Day With Meals. Patient states he only uses when needed     • metFORMIN (GLUCOPHAGE) 1000 MG tablet TAKE 1 TABLET BY MOUTH 2 (TWO) TIMES DAILY WITH MEALS. INDICATIONS: TYPE 2 DIABETES MELLITUS     • metoprolol succinate XL (TOPROL-XL) 25 MG 24 hr tablet      • metoprolol tartrate (LOPRESSOR) 25 MG tablet Take 12.5 mg by mouth Every Night.     • ONE TOUCH ULTRA TEST test strip USE TO TEST BLOOD SUGAR THREE TIMES A DAY  1   • rosuvastatin (CRESTOR) 10 MG tablet      • valsartan (DIOVAN) 160 MG tablet Take 40 mg by mouth Every Night. 1/2 tablet daily      • valsartan-hydrochlorothiazide (DIOVAN-HCT) 160-12.5 MG per tablet Take  by mouth.     • vitamin C (ASCORBIC ACID) 500 MG tablet Take 500 mg by mouth Every Night.     • vitamin E 200 UNIT capsule Take 200 Units by mouth Every Night.       No current facility-administered medications for this visit.            OBJECTIVE    Pulse 97   Ht 190.5 cm (75\")   Wt (!) 142 kg (313 lb)   SpO2 98%   BMI 39.12 kg/m²     Review of Systems   Constitutional: Negative.    HENT: Negative.    Eyes: Negative.    Respiratory: Negative.    Cardiovascular: Negative.    Gastrointestinal: Negative.    Endocrine: Negative.    Genitourinary: Negative.    Musculoskeletal: Negative.    Skin: Positive for wound.   Neurological: Positive for numbness.             Physical Exam   Constitutional: He is oriented to person, place, and time. He appears well-developed and well-nourished. No " distress.   HENT:   Head: Normocephalic and atraumatic.   Pulmonary/Chest: Effort normal. No respiratory distress. He has no wheezes.   Neurological: He is alert and oriented to person, place, and time.   Skin: Skin is warm and dry. Capillary refill takes less than 2 seconds.   Psychiatric: He has a normal mood and affect. Judgment normal.   Vitals reviewed.    Left Lower Extremity:     Cardiovascular:    CFT brisk  to all digits  Skin temp is warm to warm from proximal tibia to distal digits  Pedal hair growth diminished.   No erythema or edema noted      Musculoskeletal:  Left hallux slightly abducted  Digits 2 through 5 left are rectus     Dermatological:   Toenails 1 through 5 are thickened, discolored   No subcutaneous nodules or masses noted    Ulcer to plantar lateral left foot  Measures 0.8 x 0.5 x 0.1 cm.  Base is fibrotic and granular.  No signs of infection.  Does not probe to bone.    Neurological:   Protective sensation absent        Procedures        ASSESSMENT AND PLAN    Emre was seen today for follow-up and foot ulcer.    Diagnoses and all orders for this visit:    Diabetic ulcer of left midfoot associated with type 2 diabetes mellitus, with fat layer exposed (CMS/HCC)    Charcot's joint of left foot      -Ulcer to plantar left foot was selectively debrided with a #15 blade.  Post debridement measurements are 1.0 x 0.5 x 0.1 cm.  Ulcer was dressed.  Patient instructed on daily dressing changes.  Weightbearing as tolerated in boot only.  - All his questions were answered  - Return to clinic 1 week          This document has been electronically signed by Marco A Thompson DPM on June 26, 2019 8:59 AM

## 2019-07-03 ENCOUNTER — OFFICE VISIT (OUTPATIENT)
Dept: PODIATRY | Facility: CLINIC | Age: 59
End: 2019-07-03

## 2019-07-03 VITALS — WEIGHT: 313 LBS | OXYGEN SATURATION: 98 % | BODY MASS INDEX: 38.92 KG/M2 | HEIGHT: 75 IN | HEART RATE: 86 BPM

## 2019-07-03 DIAGNOSIS — L97.422 DIABETIC ULCER OF LEFT MIDFOOT ASSOCIATED WITH TYPE 2 DIABETES MELLITUS, WITH FAT LAYER EXPOSED (HCC): Primary | ICD-10-CM

## 2019-07-03 DIAGNOSIS — M14.672 CHARCOT'S JOINT OF LEFT FOOT: ICD-10-CM

## 2019-07-03 DIAGNOSIS — E11.621 DIABETIC ULCER OF LEFT MIDFOOT ASSOCIATED WITH TYPE 2 DIABETES MELLITUS, WITH FAT LAYER EXPOSED (HCC): Primary | ICD-10-CM

## 2019-07-03 PROCEDURE — 11042 DBRDMT SUBQ TIS 1ST 20SQCM/<: CPT | Performed by: PODIATRIST

## 2019-07-03 NOTE — PROGRESS NOTES
Emre Lisa  1960  59 y.o. male   [place] - 6/13/2019  A1c 5.6 (6/10/2019)  BS: 92 per patient this morning     Patient presents today for a recheck of his left foot ulcer.    07/03/19  Chief Complaint   Patient presents with   • Left Foot - Follow-up, Foot Ulcer       History of Present Illness    Patient presents to clinic for follow-up of his left foot ulcer.  No new complaints.      Past Medical History:   Diagnosis Date   • Arthritis    • Atrial fibrillation (CMS/HCC)    • Diabetes mellitus (CMS/HCC)    • Diabetic foot ulcer associated with type 2 diabetes mellitus (CMS/HCC)     left great toe   • Hallux malleus of left foot    • Hammer toe    • Hypertension    • MI (myocardial infarction) (CMS/[facility])    • Neuropathy in diabetes (CMS/[facility])    • Onychomycosis          Past Surgical History:   Procedure Laterality Date   • AMPUTATION DIGIT Right 3/5/2017    Procedure: RIGHT AMPUTATION TRANSMETATRASAL , RECESSION GASTROCNEMOUS;  Surgeon: Marco A Thompson DPM;  Location: [facility];  Service:    • CARDIAC DEFIBRILLATOR PLACEMENT  2010, 2016   • CARPAL TUNNEL RELEASE  2015    elbow and wrist left arm   • FOOT IRRIGATION, DEBRIDEMENT AND REPAIR Left 3/7/2018    Procedure: FOOT IRRIGATION, DEBRIDEMENT AND REPAIR;  Surgeon: Marco A Thompson DPM;  Location: [facility];  Service:    • FOOT IRRIGATION, DEBRIDEMENT AND REPAIR Left 3/10/2018    Procedure: FOOT IRRIGATION, DEBRIDEMENT AND REPAIR;  Surgeon: Marco A Thompson DPM;  Location: [facility];  Service: Podiatry   • FOOT SURGERY     • FOOT WOUND CLOSURE Right 3/2/2017    Procedure: INCISION AND DRAINAGE, RIGHT FOOT;  Surgeon: Tung Rosenthal DPM;  Location: [facility];  Service:    • HAMMER TOE REPAIR Left 2/15/2018    Procedure: HAMMERTOE CORRECTION LEFT SECOND, THIRD AND FOURTH TOES AND HALLUX INTERPHALANGEAL JOINT ARTHRODESIS LEFT FOOT (Micro Asnis, 4.0 & 5.0 Asnis)      (c-arm);  Surgeon: Marco A Thompson DPM;  Location: [facility];  Service:    • HARDWARE  REMOVAL Left 3/5/2018    Procedure: ANKLE/FOOT HARDWARE REMOVAL;  Surgeon: Marco A Thompson DPM;  Location: [facility] OR;  Service:    • INCISION AND DRAINAGE LEG Left 3/5/2018    Procedure: INCISION AND DRAINAGE LOWER EXTREMITY;  Surgeon: Marco A Thompson DPM;  Location: [facility];  Service:    • TOE AMPUTATION Right 2016   • TONSILECTOMY, ADENOIDECTOMY, BILATERAL MYRINGOTOMY AND TUBES      age 25         Family History   Problem Relation Age of Onset   • Diabetes Father    • Stroke Father    • No Known Problems Maternal Grandmother    • No Known Problems Maternal Grandfather    • No Known Problems Paternal Grandmother    • No Known Problems Paternal Grandfather    • No Known Problems Son    • No Known Problems Daughter    • No Known Problems Daughter          Social History     Socioeconomic History   • Marital status:      Spouse name: Not on file   • Number of children: Not on file   • Years of education: Not on file   • Highest education level: Not on file   Tobacco Use   • Smoking status: Never Smoker   • Smokeless tobacco: Never Used   Substance and Sexual Activity   • Alcohol use: No   • Drug use: No   • Sexual activity: Defer         Current Outpatient Medications   Medication Sig Dispense Refill   • aspirin 81 MG chewable tablet Chew 81 mg Every Night.     • Bacitracin Zinc (MAGIC BUTT OINTMENT) Apply 1 each topically 2 (Two) Times a Day. 120 g 1   • cefdinir (OMNICEF) 300 MG capsule      • Cholecalciferol (VITAMIN D3) 5000 UNITS capsule capsule Take 5,000 Units by mouth Every Night.     • CLONIDINE HCL 5 MCG/ML ORAL SOLN Take  by mouth.     • Cyanocobalamin (VITAMIN B 12 PO) Take 1,000 mcg by mouth Every Night.     • doxycycline (VIBRAMYCIN) 100 MG capsule Take 1 capsule by mouth 2 (Two) Times a Day. 28 capsule 0   • dronedarone (MULTAQ) 400 MG tablet Take 400 mg by mouth Every Night.     • fluticasone (FLONASE) 50 MCG/ACT nasal spray 2 sprays into each nostril As Needed for Rhinitis.     • glimepiride  "(AMARYL) 2 MG tablet Take 4 mg by mouth 2 (Two) Times a Day.     • glimepiride (AMARYL) 2 MG tablet TAKE 1 TABLET BY MOUTH 2 (TWO) TIMES DAILY WITH FOOD     • HYDROcodone-acetaminophen (NORCO) 7.5-325 MG per tablet Take 1 tablet by mouth Every 6 (Six) Hours As Needed for Moderate Pain . 30 tablet 0   • losartan (COZAAR) 25 MG tablet      • magnesium oxide (MAGOX) 400 (241.3 Mg) MG tablet tablet Take 400 mg by mouth 2 (Two) Times a Day.     • metFORMIN (GLUCOPHAGE) 1000 MG tablet Take 1,000 mg by mouth 2 (Two) Times a Day With Meals. Patient states he only uses when needed     • metFORMIN (GLUCOPHAGE) 1000 MG tablet TAKE 1 TABLET BY MOUTH 2 (TWO) TIMES DAILY WITH MEALS. INDICATIONS: TYPE 2 DIABETES MELLITUS     • metoprolol succinate XL (TOPROL-XL) 25 MG 24 hr tablet      • metoprolol tartrate (LOPRESSOR) 25 MG tablet Take 12.5 mg by mouth Every Night.     • ONE TOUCH ULTRA TEST test strip USE TO TEST BLOOD SUGAR THREE TIMES A DAY  1   • rosuvastatin (CRESTOR) 10 MG tablet      • valsartan (DIOVAN) 160 MG tablet Take 40 mg by mouth Every Night. 1/2 tablet daily      • valsartan-hydrochlorothiazide (DIOVAN-HCT) 160-12.5 MG per tablet Take  by mouth.     • vitamin C (ASCORBIC ACID) 500 MG tablet Take 500 mg by mouth Every Night.     • vitamin E 200 UNIT capsule Take 200 Units by mouth Every Night.       No current facility-administered medications for this visit.            OBJECTIVE    Pulse 86   Ht 190.5 cm (75\")   Wt (!) 142 kg (313 lb)   SpO2 98%   BMI 39.12 kg/m²     Review of Systems   Constitutional: Negative.    HENT: Negative.    Eyes: Negative.    Respiratory: Negative.    Cardiovascular: Negative.    Gastrointestinal: Negative.    Endocrine: Negative.    Genitourinary: Negative.    Musculoskeletal: Negative.    Skin: Positive for wound.   Neurological: Positive for numbness.             Physical Exam   Constitutional: He is oriented to person, place, and time. He appears well-developed and " well-nourished. No distress.   HENT:   Head: Normocephalic and atraumatic.   Pulmonary/Chest: Effort normal. No respiratory distress. He has no wheezes.   Neurological: He is alert and oriented to person, place, and time.   Skin: Skin is warm and dry. Capillary refill takes less than 2 seconds.   Psychiatric: He has a normal mood and affect. Judgment normal.   Vitals reviewed.    Left Lower Extremity:     Cardiovascular:    CFT brisk  to all digits  Skin temp is warm to warm from proximal tibia to distal digits  Pedal hair growth diminished.   No erythema or edema noted      Musculoskeletal:  Left hallux slightly abducted  Digits 2 through 5 left are rectus     Dermatological:   Toenails 1 through 5 are thickened, discolored   No subcutaneous nodules or masses noted    Ulcer to plantar lateral left foot  Measures 1.0 x 0.4 x 0.1cm. HPK border.  Base is granular.  No signs of infection.  Does not probe to bone.    Neurological:   Protective sensation absent        Procedures        ASSESSMENT AND PLAN    Emre was seen today for follow-up and foot ulcer.    Diagnoses and all orders for this visit:    Diabetic ulcer of left midfoot associated with type 2 diabetes mellitus, with fat layer exposed (CMS/HCC)    Charcot's joint of left foot      -Ulcer to plantar left foot was selectively debrided of callus with a #15 blade.  Post debridement measurements are 1.0 x 0.4 x 0.1 cm.  Ulcer was dressed.  Patient instructed on daily dressing changes.  Weightbearing as tolerated in boot only.  - All his questions were answered  - Return to clinic 1 week          This document has been electronically signed by Marco A Thompson DPM on July 3, 2019 8:39 AM

## 2019-07-10 ENCOUNTER — OFFICE VISIT (OUTPATIENT)
Dept: PODIATRY | Facility: CLINIC | Age: 59
End: 2019-07-10

## 2019-07-10 VITALS — OXYGEN SATURATION: 98 % | HEIGHT: 75 IN | WEIGHT: 313 LBS | HEART RATE: 92 BPM | BODY MASS INDEX: 38.92 KG/M2

## 2019-07-10 DIAGNOSIS — E11.621 DIABETIC ULCER OF LEFT MIDFOOT ASSOCIATED WITH TYPE 2 DIABETES MELLITUS, WITH FAT LAYER EXPOSED (HCC): Primary | ICD-10-CM

## 2019-07-10 DIAGNOSIS — L97.422 DIABETIC ULCER OF LEFT MIDFOOT ASSOCIATED WITH TYPE 2 DIABETES MELLITUS, WITH FAT LAYER EXPOSED (HCC): Primary | ICD-10-CM

## 2019-07-10 DIAGNOSIS — M14.672 CHARCOT'S JOINT OF LEFT FOOT: ICD-10-CM

## 2019-07-10 PROCEDURE — 11042 DBRDMT SUBQ TIS 1ST 20SQCM/<: CPT | Performed by: PODIATRIST

## 2019-07-10 NOTE — PROGRESS NOTES
Emre Lisa  1960  59 y.o. male   [place] - 6/13/2019  A1c 5.6 (6/10/2019)  BS - 115 per pt    Patient presents today for a recheck of his left foot ulcer.    07/10/19  Chief Complaint   Patient presents with   • Left Foot - Follow-up, Foot Ulcer       History of Present Illness    Patient presents to clinic for follow-up of his left foot ulcer.         Past Medical History:   Diagnosis Date   • Arthritis    • Atrial fibrillation (CMS/HCC)    • Diabetes mellitus (CMS/HCC)    • Diabetic foot ulcer associated with type 2 diabetes mellitus (CMS/HCC)     left great toe   • Hallux malleus of left foot    • Hammer toe    • Hypertension    • MI (myocardial infarction) (CMS/HCC)    • Neuropathy in diabetes (CMS/HCC)    • Onychomycosis          Past Surgical History:   Procedure Laterality Date   • AMPUTATION DIGIT Right 3/5/2017    Procedure: RIGHT AMPUTATION TRANSMETATRASAL , RECESSION GASTROCNEMOUS;  Surgeon: Marco A Thompson DPM;  Location: [facility];  Service:    • CARDIAC DEFIBRILLATOR PLACEMENT  2010, 2016   • CARPAL TUNNEL RELEASE  2015    elbow and wrist left arm   • FOOT IRRIGATION, DEBRIDEMENT AND REPAIR Left 3/7/2018    Procedure: FOOT IRRIGATION, DEBRIDEMENT AND REPAIR;  Surgeon: Marco A Thompson DPM;  Location: [facility];  Service:    • FOOT IRRIGATION, DEBRIDEMENT AND REPAIR Left 3/10/2018    Procedure: FOOT IRRIGATION, DEBRIDEMENT AND REPAIR;  Surgeon: Marco A Thompson DPM;  Location: [facility];  Service: Podiatry   • FOOT SURGERY     • FOOT WOUND CLOSURE Right 3/2/2017    Procedure: INCISION AND DRAINAGE, RIGHT FOOT;  Surgeon: Tung Rosenthal DPM;  Location: [facility];  Service:    • HAMMER TOE REPAIR Left 2/15/2018    Procedure: HAMMERTOE CORRECTION LEFT SECOND, THIRD AND FOURTH TOES AND HALLUX INTERPHALANGEAL JOINT ARTHRODESIS LEFT FOOT (Micro Asnis, 4.0 & 5.0 Asnis)      (c-arm);  Surgeon: Marco A Thompson DPM;  Location: [facility];  Service:    • HARDWARE REMOVAL Left 3/5/2018    Procedure:  ANKLE/FOOT HARDWARE REMOVAL;  Surgeon: Marco A Thompson DPM;  Location: [facility];  Service:    • INCISION AND DRAINAGE LEG Left 3/5/2018    Procedure: INCISION AND DRAINAGE LOWER EXTREMITY;  Surgeon: Marco A Thompson DPM;  Location: [facility];  Service:    • TOE AMPUTATION Right 2016   • TONSILECTOMY, ADENOIDECTOMY, BILATERAL MYRINGOTOMY AND TUBES      age 25         Family History   Problem Relation Age of Onset   • Diabetes Father    • Stroke Father    • No Known Problems Maternal Grandmother    • No Known Problems Maternal Grandfather    • No Known Problems Paternal Grandmother    • No Known Problems Paternal Grandfather    • No Known Problems Son    • No Known Problems Daughter    • No Known Problems Daughter          Social History     Socioeconomic History   • Marital status:      Spouse name: Not on file   • Number of children: Not on file   • Years of education: Not on file   • Highest education level: Not on file   Tobacco Use   • Smoking status: Never Smoker   • Smokeless tobacco: Never Used   Substance and Sexual Activity   • Alcohol use: No   • Drug use: No   • Sexual activity: Defer         Current Outpatient Medications   Medication Sig Dispense Refill   • aspirin 81 MG chewable tablet Chew 81 mg Every Night.     • Bacitracin Zinc (MAGIC BUTT OINTMENT) Apply 1 each topically 2 (Two) Times a Day. 120 g 1   • cefdinir (OMNICEF) 300 MG capsule      • Cholecalciferol (VITAMIN D3) 5000 UNITS capsule capsule Take 5,000 Units by mouth Every Night.     • CLONIDINE HCL 5 MCG/ML ORAL SOLN Take  by mouth.     • Cyanocobalamin (VITAMIN B 12 PO) Take 1,000 mcg by mouth Every Night.     • doxycycline (VIBRAMYCIN) 100 MG capsule Take 1 capsule by mouth 2 (Two) Times a Day. 28 capsule 0   • dronedarone (MULTAQ) 400 MG tablet Take 400 mg by mouth Every Night.     • fluticasone (FLONASE) 50 MCG/ACT nasal spray 2 sprays into each nostril As Needed for Rhinitis.     • glimepiride (AMARYL) 2 MG tablet Take 4 mg by  "mouth 2 (Two) Times a Day.     • glimepiride (AMARYL) 2 MG tablet TAKE 1 TABLET BY MOUTH 2 (TWO) TIMES DAILY WITH FOOD     • HYDROcodone-acetaminophen (NORCO) 7.5-325 MG per tablet Take 1 tablet by mouth Every 6 (Six) Hours As Needed for Moderate Pain . 30 tablet 0   • losartan (COZAAR) 25 MG tablet      • magnesium oxide (MAGOX) 400 (241.3 Mg) MG tablet tablet Take 400 mg by mouth 2 (Two) Times a Day.     • metFORMIN (GLUCOPHAGE) 1000 MG tablet Take 1,000 mg by mouth 2 (Two) Times a Day With Meals. Patient states he only uses when needed     • metFORMIN (GLUCOPHAGE) 1000 MG tablet TAKE 1 TABLET BY MOUTH 2 (TWO) TIMES DAILY WITH MEALS. INDICATIONS: TYPE 2 DIABETES MELLITUS     • metoprolol succinate XL (TOPROL-XL) 25 MG 24 hr tablet      • metoprolol tartrate (LOPRESSOR) 25 MG tablet Take 12.5 mg by mouth Every Night.     • ONE TOUCH ULTRA TEST test strip USE TO TEST BLOOD SUGAR THREE TIMES A DAY  1   • rosuvastatin (CRESTOR) 10 MG tablet      • valsartan (DIOVAN) 160 MG tablet Take 40 mg by mouth Every Night. 1/2 tablet daily      • valsartan-hydrochlorothiazide (DIOVAN-HCT) 160-12.5 MG per tablet Take  by mouth.     • vitamin C (ASCORBIC ACID) 500 MG tablet Take 500 mg by mouth Every Night.     • vitamin E 200 UNIT capsule Take 200 Units by mouth Every Night.       No current facility-administered medications for this visit.            OBJECTIVE    Pulse 92   Ht 190.5 cm (75\")   Wt (!) 142 kg (313 lb)   SpO2 98%   BMI 39.12 kg/m²     Review of Systems   Constitutional: Negative.    HENT: Negative.    Eyes: Negative.    Respiratory: Negative.    Cardiovascular: Negative.    Gastrointestinal: Negative.    Endocrine: Negative.    Genitourinary: Negative.    Musculoskeletal: Negative.    Skin: Positive for wound.   Neurological: Positive for numbness.   Psychiatric/Behavioral: Negative.              Physical Exam   Constitutional: He is oriented to person, place, and time. He appears well-developed and " well-nourished. No distress.   HENT:   Head: Normocephalic and atraumatic.   Pulmonary/Chest: Effort normal. No respiratory distress. He has no wheezes.   Neurological: He is alert and oriented to person, place, and time.   Skin: Skin is warm and dry. Capillary refill takes less than 2 seconds.   Psychiatric: He has a normal mood and affect. Judgment normal.   Vitals reviewed.    Left Lower Extremity:     Cardiovascular:    CFT brisk  to all digits  Skin temp is warm to warm from proximal tibia to distal digits  Pedal hair growth diminished.   No erythema or edema noted      Musculoskeletal:  Left hallux slightly abducted  Digits 2 through 5 left are rectus     Dermatological:   Toenails 1 through 5 are thickened, discolored   No subcutaneous nodules or masses noted    Ulcer to plantar lateral left foot  Measures 0.9 x 0.3 x 0.1cm. HPK border.  Base is granular.  No signs of infection.  Does not probe to bone.    Neurological:   Protective sensation absent        Procedures        ASSESSMENT AND PLAN    Emre was seen today for follow-up and foot ulcer.    Diagnoses and all orders for this visit:    Diabetic ulcer of left midfoot associated with type 2 diabetes mellitus, with fat layer exposed (CMS/HCC)    Charcot's joint of left foot      -Recommended total contact cast.  Patient declined.  -Ulcer to plantar left foot was selectively debrided of callus with a #15 blade.  Post debridement measurements are 1.0 x 0.3 x 0.1 cm.  Ulcer was dressed.  Patient instructed on daily dressing changes.  Weightbearing as tolerated in boot only.  - All his questions were answered  - Return to clinic 1 week          This document has been electronically signed by Marco A Thompson DPM on July 10, 2019 5:26 PM

## 2019-07-17 ENCOUNTER — OFFICE VISIT (OUTPATIENT)
Dept: PODIATRY | Facility: CLINIC | Age: 59
End: 2019-07-17

## 2019-07-17 VITALS — HEART RATE: 83 BPM | OXYGEN SATURATION: 98 % | WEIGHT: 313 LBS | HEIGHT: 75 IN | BODY MASS INDEX: 38.92 KG/M2

## 2019-07-17 DIAGNOSIS — M14.672 CHARCOT'S JOINT OF LEFT FOOT: ICD-10-CM

## 2019-07-17 DIAGNOSIS — E11.621 DIABETIC ULCER OF LEFT MIDFOOT ASSOCIATED WITH TYPE 2 DIABETES MELLITUS, WITH FAT LAYER EXPOSED (HCC): Primary | ICD-10-CM

## 2019-07-17 DIAGNOSIS — L97.422 DIABETIC ULCER OF LEFT MIDFOOT ASSOCIATED WITH TYPE 2 DIABETES MELLITUS, WITH FAT LAYER EXPOSED (HCC): Primary | ICD-10-CM

## 2019-07-17 PROCEDURE — 11042 DBRDMT SUBQ TIS 1ST 20SQCM/<: CPT | Performed by: PODIATRIST

## 2019-07-17 NOTE — PROGRESS NOTES
Emre Lisa  1960  59 y.o. male   [place] - 6/13/2019  A1c 5.6 (6/10/2019)  BS - 96 per patient      Patient presents today for a recheck of his left foot ulcer.    07/17/19  Chief Complaint   Patient presents with   • Left Foot - Follow-up, Foot Ulcer       History of Present Illness    Patient presents to clinic for follow-up of his left foot ulcer.         Past Medical History:   Diagnosis Date   • Arthritis    • Atrial fibrillation (CMS/HCC)    • Diabetes mellitus (CMS/HCC)    • Diabetic foot ulcer associated with type 2 diabetes mellitus (CMS/HCC)     left great toe   • Hallux malleus of left foot    • Hammer toe    • Hypertension    • MI (myocardial infarction) (CMS/HCC)    • Neuropathy in diabetes (CMS/HCC)    • Onychomycosis          Past Surgical History:   Procedure Laterality Date   • AMPUTATION DIGIT Right 3/5/2017    Procedure: RIGHT AMPUTATION TRANSMETATRASAL , RECESSION GASTROCNEMOUS;  Surgeon: Marco A Thompson DPM;  Location: [facility];  Service:    • CARDIAC DEFIBRILLATOR PLACEMENT  2010, 2016   • CARPAL TUNNEL RELEASE  2015    elbow and wrist left arm   • FOOT IRRIGATION, DEBRIDEMENT AND REPAIR Left 3/7/2018    Procedure: FOOT IRRIGATION, DEBRIDEMENT AND REPAIR;  Surgeon: Marco A Thompson DPM;  Location: [facility];  Service:    • FOOT IRRIGATION, DEBRIDEMENT AND REPAIR Left 3/10/2018    Procedure: FOOT IRRIGATION, DEBRIDEMENT AND REPAIR;  Surgeon: Marco A Thompson DPM;  Location: [facility];  Service: Podiatry   • FOOT SURGERY     • FOOT WOUND CLOSURE Right 3/2/2017    Procedure: INCISION AND DRAINAGE, RIGHT FOOT;  Surgeon: Tung Rosenthal DPM;  Location: [facility] OR;  Service:    • HAMMER TOE REPAIR Left 2/15/2018    Procedure: HAMMERTOE CORRECTION LEFT SECOND, THIRD AND FOURTH TOES AND HALLUX INTERPHALANGEAL JOINT ARTHRODESIS LEFT FOOT (Micro Asnis, 4.0 & 5.0 Asnis)      (c-arm);  Surgeon: Marco A Thompson DPM;  Location: [facility];  Service:    • HARDWARE REMOVAL Left 3/5/2018     Procedure: ANKLE/FOOT HARDWARE REMOVAL;  Surgeon: Marco A Thompson DPM;  Location: [facility];  Service:    • INCISION AND DRAINAGE LEG Left 3/5/2018    Procedure: INCISION AND DRAINAGE LOWER EXTREMITY;  Surgeon: Marco A Thompson DPM;  Location: [facility];  Service:    • TOE AMPUTATION Right 2016   • TONSILECTOMY, ADENOIDECTOMY, BILATERAL MYRINGOTOMY AND TUBES      age 25         Family History   Problem Relation Age of Onset   • Diabetes Father    • Stroke Father    • No Known Problems Maternal Grandmother    • No Known Problems Maternal Grandfather    • No Known Problems Paternal Grandmother    • No Known Problems Paternal Grandfather    • No Known Problems Son    • No Known Problems Daughter    • No Known Problems Daughter          Social History     Socioeconomic History   • Marital status:      Spouse name: Not on file   • Number of children: Not on file   • Years of education: Not on file   • Highest education level: Not on file   Tobacco Use   • Smoking status: Never Smoker   • Smokeless tobacco: Never Used   Substance and Sexual Activity   • Alcohol use: No   • Drug use: No   • Sexual activity: Defer         Current Outpatient Medications   Medication Sig Dispense Refill   • aspirin 81 MG chewable tablet Chew 81 mg Every Night.     • Bacitracin Zinc (MAGIC BUTT OINTMENT) Apply 1 each topically 2 (Two) Times a Day. 120 g 1   • cefdinir (OMNICEF) 300 MG capsule      • Cholecalciferol (VITAMIN D3) 5000 UNITS capsule capsule Take 5,000 Units by mouth Every Night.     • CLONIDINE HCL 5 MCG/ML ORAL SOLN Take  by mouth.     • Cyanocobalamin (VITAMIN B 12 PO) Take 1,000 mcg by mouth Every Night.     • doxycycline (VIBRAMYCIN) 100 MG capsule Take 1 capsule by mouth 2 (Two) Times a Day. 28 capsule 0   • dronedarone (MULTAQ) 400 MG tablet Take 400 mg by mouth Every Night.     • fluticasone (FLONASE) 50 MCG/ACT nasal spray 2 sprays into each nostril As Needed for Rhinitis.     • glimepiride (AMARYL) 2 MG tablet Take  "4 mg by mouth 2 (Two) Times a Day.     • glimepiride (AMARYL) 2 MG tablet TAKE 1 TABLET BY MOUTH 2 (TWO) TIMES DAILY WITH FOOD     • HYDROcodone-acetaminophen (NORCO) 7.5-325 MG per tablet Take 1 tablet by mouth Every 6 (Six) Hours As Needed for Moderate Pain . 30 tablet 0   • losartan (COZAAR) 25 MG tablet      • magnesium oxide (MAGOX) 400 (241.3 Mg) MG tablet tablet Take 400 mg by mouth 2 (Two) Times a Day.     • metFORMIN (GLUCOPHAGE) 1000 MG tablet Take 1,000 mg by mouth 2 (Two) Times a Day With Meals. Patient states he only uses when needed     • metFORMIN (GLUCOPHAGE) 1000 MG tablet TAKE 1 TABLET BY MOUTH 2 (TWO) TIMES DAILY WITH MEALS. INDICATIONS: TYPE 2 DIABETES MELLITUS     • metoprolol succinate XL (TOPROL-XL) 25 MG 24 hr tablet      • metoprolol tartrate (LOPRESSOR) 25 MG tablet Take 12.5 mg by mouth Every Night.     • ONE TOUCH ULTRA TEST test strip USE TO TEST BLOOD SUGAR THREE TIMES A DAY  1   • rosuvastatin (CRESTOR) 10 MG tablet      • valsartan (DIOVAN) 160 MG tablet Take 40 mg by mouth Every Night. 1/2 tablet daily      • valsartan-hydrochlorothiazide (DIOVAN-HCT) 160-12.5 MG per tablet Take  by mouth.     • vitamin C (ASCORBIC ACID) 500 MG tablet Take 500 mg by mouth Every Night.     • vitamin E 200 UNIT capsule Take 200 Units by mouth Every Night.       No current facility-administered medications for this visit.            OBJECTIVE    Pulse 83   Ht 190.5 cm (75\")   Wt (!) 142 kg (313 lb)   SpO2 98%   BMI 39.12 kg/m²     Review of Systems   Constitutional: Negative.    HENT: Negative.    Eyes: Negative.    Respiratory: Negative.    Cardiovascular: Negative.    Gastrointestinal: Negative.    Endocrine: Negative.    Genitourinary: Negative.    Musculoskeletal: Negative.    Skin: Positive for wound.   Neurological: Positive for numbness.   Psychiatric/Behavioral: Negative.              Physical Exam   Constitutional: He is oriented to person, place, and time. He appears well-developed and " well-nourished. No distress.   HENT:   Head: Normocephalic and atraumatic.   Pulmonary/Chest: Effort normal. No respiratory distress. He has no wheezes.   Neurological: He is alert and oriented to person, place, and time.   Skin: Skin is warm and dry. Capillary refill takes less than 2 seconds.   Psychiatric: He has a normal mood and affect. Judgment normal.   Vitals reviewed.    Left Lower Extremity:     Cardiovascular:    CFT brisk  to all digits  Skin temp is warm to warm from proximal tibia to distal digits  Pedal hair growth diminished.   No erythema or edema noted      Musculoskeletal:  Left hallux slightly abducted  Digits 2 through 5 left are rectus     Dermatological:   Toenails 1 through 5 are thickened, discolored   No subcutaneous nodules or masses noted    Ulcer to plantar lateral left foot  Measures 1.0 x 0.2 x 0.1cm. HPK border.  Base is fibro-granular.  No signs of infection.  Does not probe to bone.    Neurological:   Protective sensation absent        Procedures        ASSESSMENT AND PLAN    Emre was seen today for follow-up and foot ulcer.    Diagnoses and all orders for this visit:    Diabetic ulcer of left midfoot associated with type 2 diabetes mellitus, with fat layer exposed (CMS/HCC)    Charcot's joint of left foot      - Again recommended increased offloading with total contact cast.  Patient declined.  -Ulcer to plantar left foot was selectively debrided of callus and fibrotic tissue with a #15 blade.  Post debridement measurements are 1.0 x 0.3 x 0.1 cm.  Ulcer was dressed.  Patient instructed on daily dressing changes.  Weightbearing as tolerated in boot only.  - All his questions were answered  - Return to clinic 1 week          This document has been electronically signed by Marco A Thompson DPM on July 17, 2019 8:32 AM

## 2019-07-23 ENCOUNTER — OFFICE VISIT (OUTPATIENT)
Dept: PODIATRY | Facility: CLINIC | Age: 59
End: 2019-07-23

## 2019-07-23 VITALS — WEIGHT: 313 LBS | BODY MASS INDEX: 38.92 KG/M2 | HEART RATE: 106 BPM | OXYGEN SATURATION: 98 % | HEIGHT: 75 IN

## 2019-07-23 DIAGNOSIS — M14.672 CHARCOT'S JOINT OF LEFT FOOT: ICD-10-CM

## 2019-07-23 DIAGNOSIS — E11.621 DIABETIC ULCER OF LEFT MIDFOOT ASSOCIATED WITH TYPE 2 DIABETES MELLITUS, WITH FAT LAYER EXPOSED (HCC): Primary | ICD-10-CM

## 2019-07-23 DIAGNOSIS — L97.422 DIABETIC ULCER OF LEFT MIDFOOT ASSOCIATED WITH TYPE 2 DIABETES MELLITUS, WITH FAT LAYER EXPOSED (HCC): Primary | ICD-10-CM

## 2019-07-23 PROCEDURE — 11042 DBRDMT SUBQ TIS 1ST 20SQCM/<: CPT | Performed by: PODIATRIST

## 2019-07-23 NOTE — PROGRESS NOTES
Emre Lisa  1960  59 y.o. male   [place] - 6/13/2019  BS - 101 per patient    Patient presents today for a recheck of his left foot ulcer.    07/23/19  Chief Complaint   Patient presents with   • Left Foot - Follow-up, Foot Ulcer       History of Present Illness    Patient presents to clinic for follow-up of his left foot ulcer.         Past Medical History:   Diagnosis Date   • Arthritis    • Atrial fibrillation (CMS/HCC)    • Diabetes mellitus (CMS/HCC)    • Diabetic foot ulcer associated with type 2 diabetes mellitus (CMS/HCC)     left great toe   • Hallux malleus of left foot    • Hammer toe    • Hypertension    • MI (myocardial infarction) (CMS/HCC)    • Neuropathy in diabetes (CMS/HCC)    • Onychomycosis          Past Surgical History:   Procedure Laterality Date   • AMPUTATION DIGIT Right 3/5/2017    Procedure: RIGHT AMPUTATION TRANSMETATRASAL , RECESSION GASTROCNEMOUS;  Surgeon: Marco A Thompson DPM;  Location: [facility];  Service:    • CARDIAC DEFIBRILLATOR PLACEMENT  2010, 2016   • CARPAL TUNNEL RELEASE  2015    elbow and wrist left arm   • FOOT IRRIGATION, DEBRIDEMENT AND REPAIR Left 3/7/2018    Procedure: FOOT IRRIGATION, DEBRIDEMENT AND REPAIR;  Surgeon: Marco A Thompson DPM;  Location: [facility];  Service:    • FOOT IRRIGATION, DEBRIDEMENT AND REPAIR Left 3/10/2018    Procedure: FOOT IRRIGATION, DEBRIDEMENT AND REPAIR;  Surgeon: Marco A Thompson DPM;  Location: [facility];  Service: Podiatry   • FOOT SURGERY     • FOOT WOUND CLOSURE Right 3/2/2017    Procedure: INCISION AND DRAINAGE, RIGHT FOOT;  Surgeon: Tung Rosenthal DPM;  Location: [facility];  Service:    • HAMMER TOE REPAIR Left 2/15/2018    Procedure: HAMMERTOE CORRECTION LEFT SECOND, THIRD AND FOURTH TOES AND HALLUX INTERPHALANGEAL JOINT ARTHRODESIS LEFT FOOT (Micro Asnis, 4.0 & 5.0 Asnis)      (c-arm);  Surgeon: Marco A Thompson DPM;  Location: [facility];  Service:    • HARDWARE REMOVAL Left 3/5/2018    Procedure: ANKLE/FOOT  HARDWARE REMOVAL;  Surgeon: Marco A Thompson DPM;  Location: [facility];  Service:    • INCISION AND DRAINAGE LEG Left 3/5/2018    Procedure: INCISION AND DRAINAGE LOWER EXTREMITY;  Surgeon: Marco A Thompson DPM;  Location: [facility];  Service:    • TOE AMPUTATION Right 2016   • TONSILECTOMY, ADENOIDECTOMY, BILATERAL MYRINGOTOMY AND TUBES      age 25         Family History   Problem Relation Age of Onset   • Diabetes Father    • Stroke Father    • No Known Problems Maternal Grandmother    • No Known Problems Maternal Grandfather    • No Known Problems Paternal Grandmother    • No Known Problems Paternal Grandfather    • No Known Problems Son    • No Known Problems Daughter    • No Known Problems Daughter          Social History     Socioeconomic History   • Marital status:      Spouse name: Not on file   • Number of children: Not on file   • Years of education: Not on file   • Highest education level: Not on file   Tobacco Use   • Smoking status: Never Smoker   • Smokeless tobacco: Never Used   Substance and Sexual Activity   • Alcohol use: No   • Drug use: No   • Sexual activity: Defer         Current Outpatient Medications   Medication Sig Dispense Refill   • aspirin 81 MG chewable tablet Chew 81 mg Every Night.     • Bacitracin Zinc (MAGIC BUTT OINTMENT) Apply 1 each topically 2 (Two) Times a Day. 120 g 1   • cefdinir (OMNICEF) 300 MG capsule      • Cholecalciferol (VITAMIN D3) 5000 UNITS capsule capsule Take 5,000 Units by mouth Every Night.     • CLONIDINE HCL 5 MCG/ML ORAL SOLN Take  by mouth.     • Cyanocobalamin (VITAMIN B 12 PO) Take 1,000 mcg by mouth Every Night.     • doxycycline (VIBRAMYCIN) 100 MG capsule Take 1 capsule by mouth 2 (Two) Times a Day. 28 capsule 0   • dronedarone (MULTAQ) 400 MG tablet Take 400 mg by mouth Every Night.     • fluticasone (FLONASE) 50 MCG/ACT nasal spray 2 sprays into each nostril As Needed for Rhinitis.     • glimepiride (AMARYL) 2 MG tablet Take 4 mg by mouth 2 (Two)  "Times a Day.     • glimepiride (AMARYL) 2 MG tablet TAKE 1 TABLET BY MOUTH 2 (TWO) TIMES DAILY WITH FOOD     • HYDROcodone-acetaminophen (NORCO) 7.5-325 MG per tablet Take 1 tablet by mouth Every 6 (Six) Hours As Needed for Moderate Pain . 30 tablet 0   • losartan (COZAAR) 25 MG tablet      • magnesium oxide (MAGOX) 400 (241.3 Mg) MG tablet tablet Take 400 mg by mouth 2 (Two) Times a Day.     • metFORMIN (GLUCOPHAGE) 1000 MG tablet Take 1,000 mg by mouth 2 (Two) Times a Day With Meals. Patient states he only uses when needed     • metFORMIN (GLUCOPHAGE) 1000 MG tablet TAKE 1 TABLET BY MOUTH 2 (TWO) TIMES DAILY WITH MEALS. INDICATIONS: TYPE 2 DIABETES MELLITUS     • metoprolol succinate XL (TOPROL-XL) 25 MG 24 hr tablet      • metoprolol tartrate (LOPRESSOR) 25 MG tablet Take 12.5 mg by mouth Every Night.     • ONE TOUCH ULTRA TEST test strip USE TO TEST BLOOD SUGAR THREE TIMES A DAY  1   • rosuvastatin (CRESTOR) 10 MG tablet      • valsartan (DIOVAN) 160 MG tablet Take 40 mg by mouth Every Night. 1/2 tablet daily      • valsartan-hydrochlorothiazide (DIOVAN-HCT) 160-12.5 MG per tablet Take  by mouth.     • vitamin C (ASCORBIC ACID) 500 MG tablet Take 500 mg by mouth Every Night.     • vitamin E 200 UNIT capsule Take 200 Units by mouth Every Night.       No current facility-administered medications for this visit.            OBJECTIVE    Pulse 106   Ht 190.5 cm (75\")   Wt (!) 142 kg (313 lb)   SpO2 98%   BMI 39.12 kg/m²     Review of Systems   Constitutional: Negative.    HENT: Negative.    Eyes: Negative.    Respiratory: Negative.    Cardiovascular: Negative.    Gastrointestinal: Negative.    Endocrine: Negative.    Genitourinary: Negative.    Musculoskeletal: Negative.    Skin: Positive for wound.   Neurological: Positive for numbness.   Psychiatric/Behavioral: Negative.              Physical Exam   Constitutional: He is oriented to person, place, and time. He appears well-developed and well-nourished. No " distress.   HENT:   Head: Normocephalic and atraumatic.   Pulmonary/Chest: Effort normal. No respiratory distress. He has no wheezes.   Neurological: He is alert and oriented to person, place, and time.   Skin: Skin is warm and dry. Capillary refill takes less than 2 seconds.   Psychiatric: He has a normal mood and affect. Judgment normal.   Vitals reviewed.    Left Lower Extremity:     Cardiovascular:    CFT brisk  to all digits  Skin temp is warm to warm from proximal tibia to distal digits  Pedal hair growth diminished.   No erythema or edema noted      Musculoskeletal:  Left hallux slightly abducted  Digits 2 through 5 left are rectus     Dermatological:   Toenails 1 through 5 are thickened, discolored   No subcutaneous nodules or masses noted    Ulcer to plantar lateral left foot  Measures 1.2 x 0.4 x 0.1cm. HPK border with undermining in all directions.  Base is fibro-granular.  No signs of infection.  Does not probe to bone.    Neurological:   Protective sensation absent        Procedures        ASSESSMENT AND PLAN    Emre was seen today for follow-up and foot ulcer.    Diagnoses and all orders for this visit:    Diabetic ulcer of left midfoot associated with type 2 diabetes mellitus, with fat layer exposed (CMS/HCC)    Charcot's joint of left foot      -Ulcer to plantar left foot was excisionally debrided of callus and fibrotic and subcutaneous tissue with a #15 blade and dermal curette.  Post debridement measurements are 2.0 x 1.0 x 0 point centimeters cm.  Ulcer was dressed.  Patient agreed to total contact casting.  Total contact cast applied.  - All his questions were answered  - Return to clinic 1 week          This document has been electronically signed by Marco A Thompson DPM on July 23, 2019 4:39 PM

## 2019-07-31 ENCOUNTER — OFFICE VISIT (OUTPATIENT)
Dept: PODIATRY | Facility: CLINIC | Age: 59
End: 2019-07-31

## 2019-07-31 VITALS — HEART RATE: 92 BPM | OXYGEN SATURATION: 98 % | WEIGHT: 313 LBS | HEIGHT: 75 IN | BODY MASS INDEX: 38.92 KG/M2

## 2019-07-31 DIAGNOSIS — L97.422 DIABETIC ULCER OF LEFT MIDFOOT ASSOCIATED WITH TYPE 2 DIABETES MELLITUS, WITH FAT LAYER EXPOSED (HCC): Primary | ICD-10-CM

## 2019-07-31 DIAGNOSIS — E11.621 DIABETIC ULCER OF LEFT MIDFOOT ASSOCIATED WITH TYPE 2 DIABETES MELLITUS, WITH FAT LAYER EXPOSED (HCC): Primary | ICD-10-CM

## 2019-07-31 DIAGNOSIS — M14.672 CHARCOT'S JOINT OF LEFT FOOT: ICD-10-CM

## 2019-07-31 PROCEDURE — 11042 DBRDMT SUBQ TIS 1ST 20SQCM/<: CPT | Performed by: PODIATRIST

## 2019-08-07 ENCOUNTER — OFFICE VISIT (OUTPATIENT)
Dept: PODIATRY | Facility: CLINIC | Age: 59
End: 2019-08-07

## 2019-08-07 VITALS — BODY MASS INDEX: 38.92 KG/M2 | OXYGEN SATURATION: 97 % | HEIGHT: 75 IN | HEART RATE: 82 BPM | WEIGHT: 313 LBS

## 2019-08-07 DIAGNOSIS — E11.621 DIABETIC ULCER OF LEFT MIDFOOT ASSOCIATED WITH TYPE 2 DIABETES MELLITUS, WITH FAT LAYER EXPOSED (HCC): Primary | ICD-10-CM

## 2019-08-07 DIAGNOSIS — M14.672 CHARCOT'S JOINT OF LEFT FOOT: ICD-10-CM

## 2019-08-07 DIAGNOSIS — L97.422 DIABETIC ULCER OF LEFT MIDFOOT ASSOCIATED WITH TYPE 2 DIABETES MELLITUS, WITH FAT LAYER EXPOSED (HCC): Primary | ICD-10-CM

## 2019-08-07 PROCEDURE — 29445 APPL RIGID TOT CNTC LEG CAST: CPT | Performed by: PODIATRIST

## 2019-08-07 NOTE — PROGRESS NOTES
Emre Lisa  1960  59 y.o. male   [place] - 6/13/2019  BS - 134 per patient    Patient presents today for a recheck of his left foot ulcer and TCC change.    08/07/19     Chief Complaint   Patient presents with   • Left Foot - Follow-up, Foot Ulcer, TCC change       History of Present Illness    Patient presents to clinic for follow-up of his left foot ulcer.  He is currently being treated with total contact casting with improvement.  No new complaints.      Past Medical History:   Diagnosis Date   • Arthritis    • Atrial fibrillation (CMS/HCC)    • Diabetes mellitus (CMS/HCC)    • Diabetic foot ulcer associated with type 2 diabetes mellitus (CMS/HCC)     left great toe   • Hallux malleus of left foot    • Hammer toe    • Hypertension    • MI (myocardial infarction) (CMS/HCC)    • Neuropathy in diabetes (CMS/HCC)    • Onychomycosis          Past Surgical History:   Procedure Laterality Date   • AMPUTATION DIGIT Right 3/5/2017    Procedure: RIGHT AMPUTATION TRANSMETATRASAL , RECESSION GASTROCNEMOUS;  Surgeon: Marco A Thompson DPM;  Location: [facility];  Service:    • CARDIAC DEFIBRILLATOR PLACEMENT  2010, 2016   • CARPAL TUNNEL RELEASE  2015    elbow and wrist left arm   • FOOT IRRIGATION, DEBRIDEMENT AND REPAIR Left 3/7/2018    Procedure: FOOT IRRIGATION, DEBRIDEMENT AND REPAIR;  Surgeon: Marco A Thompson DPM;  Location: [facility];  Service:    • FOOT IRRIGATION, DEBRIDEMENT AND REPAIR Left 3/10/2018    Procedure: FOOT IRRIGATION, DEBRIDEMENT AND REPAIR;  Surgeon: Marco A Thompson DPM;  Location: [facility];  Service: Podiatry   • FOOT SURGERY     • FOOT WOUND CLOSURE Right 3/2/2017    Procedure: INCISION AND DRAINAGE, RIGHT FOOT;  Surgeon: Tung Rosenthal DPM;  Location: [facility];  Service:    • HAMMER TOE REPAIR Left 2/15/2018    Procedure: HAMMERTOE CORRECTION LEFT SECOND, THIRD AND FOURTH TOES AND HALLUX INTERPHALANGEAL JOINT ARTHRODESIS LEFT FOOT (Micro Asnis, 4.0 & 5.0 Asnis)      (c-arm);   Surgeon: Marco A Thompson DPM;  Location: [facility];  Service:    • HARDWARE REMOVAL Left 3/5/2018    Procedure: ANKLE/FOOT HARDWARE REMOVAL;  Surgeon: Marco A Thompson DPM;  Location: [facility];  Service:    • INCISION AND DRAINAGE LEG Left 3/5/2018    Procedure: INCISION AND DRAINAGE LOWER EXTREMITY;  Surgeon: Marco A Thompson DPM;  Location: [facility] OR;  Service:    • TOE AMPUTATION Right 2016   • TONSILECTOMY, ADENOIDECTOMY, BILATERAL MYRINGOTOMY AND TUBES      age 25         Family History   Problem Relation Age of Onset   • Diabetes Father    • Stroke Father    • No Known Problems Maternal Grandmother    • No Known Problems Maternal Grandfather    • No Known Problems Paternal Grandmother    • No Known Problems Paternal Grandfather    • No Known Problems Son    • No Known Problems Daughter    • No Known Problems Daughter          Social History     Socioeconomic History   • Marital status:      Spouse name: Not on file   • Number of children: Not on file   • Years of education: Not on file   • Highest education level: Not on file   Tobacco Use   • Smoking status: Never Smoker   • Smokeless tobacco: Never Used   Substance and Sexual Activity   • Alcohol use: No   • Drug use: No   • Sexual activity: Defer         Current Outpatient Medications   Medication Sig Dispense Refill   • aspirin 81 MG chewable tablet Chew 81 mg Every Night.     • Bacitracin Zinc (MAGIC BUTT OINTMENT) Apply 1 each topically 2 (Two) Times a Day. 120 g 1   • cefdinir (OMNICEF) 300 MG capsule      • Cholecalciferol (VITAMIN D3) 5000 UNITS capsule capsule Take 5,000 Units by mouth Every Night.     • CLONIDINE HCL 5 MCG/ML ORAL SOLN Take  by mouth.     • Cyanocobalamin (VITAMIN B 12 PO) Take 1,000 mcg by mouth Every Night.     • doxycycline (VIBRAMYCIN) 100 MG capsule Take 1 capsule by mouth 2 (Two) Times a Day. 28 capsule 0   • dronedarone (MULTAQ) 400 MG tablet Take 400 mg by mouth Every Night.     • fluticasone (FLONASE) 50 MCG/ACT nasal  "spray 2 sprays into each nostril As Needed for Rhinitis.     • glimepiride (AMARYL) 2 MG tablet Take 4 mg by mouth 2 (Two) Times a Day.     • glimepiride (AMARYL) 2 MG tablet TAKE 1 TABLET BY MOUTH 2 (TWO) TIMES DAILY WITH FOOD     • HYDROcodone-acetaminophen (NORCO) 7.5-325 MG per tablet Take 1 tablet by mouth Every 6 (Six) Hours As Needed for Moderate Pain . 30 tablet 0   • losartan (COZAAR) 25 MG tablet      • magnesium oxide (MAGOX) 400 (241.3 Mg) MG tablet tablet Take 400 mg by mouth 2 (Two) Times a Day.     • metFORMIN (GLUCOPHAGE) 1000 MG tablet Take 1,000 mg by mouth 2 (Two) Times a Day With Meals. Patient states he only uses when needed     • metFORMIN (GLUCOPHAGE) 1000 MG tablet TAKE 1 TABLET BY MOUTH 2 (TWO) TIMES DAILY WITH MEALS. INDICATIONS: TYPE 2 DIABETES MELLITUS     • metoprolol succinate XL (TOPROL-XL) 25 MG 24 hr tablet      • metoprolol tartrate (LOPRESSOR) 25 MG tablet Take 12.5 mg by mouth Every Night.     • ONE TOUCH ULTRA TEST test strip USE TO TEST BLOOD SUGAR THREE TIMES A DAY  1   • rosuvastatin (CRESTOR) 10 MG tablet      • valsartan (DIOVAN) 160 MG tablet Take 40 mg by mouth Every Night. 1/2 tablet daily      • valsartan-hydrochlorothiazide (DIOVAN-HCT) 160-12.5 MG per tablet Take  by mouth.     • vitamin C (ASCORBIC ACID) 500 MG tablet Take 500 mg by mouth Every Night.     • vitamin E 200 UNIT capsule Take 200 Units by mouth Every Night.       No current facility-administered medications for this visit.            OBJECTIVE    Pulse 82   Ht 190.5 cm (75\")   Wt (!) 142 kg (313 lb)   SpO2 97%   BMI 39.12 kg/m²     Review of Systems   Constitutional: Negative.    HENT: Negative.    Eyes: Negative.    Respiratory: Negative.    Gastrointestinal: Negative.    Skin: Positive for wound.   Neurological: Negative.    Psychiatric/Behavioral: Negative.              Physical Exam   Constitutional: He is oriented to person, place, and time. He appears well-developed and well-nourished. No " distress.   HENT:   Head: Normocephalic and atraumatic.   Pulmonary/Chest: Effort normal. No respiratory distress. He has no wheezes.   Neurological: He is alert and oriented to person, place, and time.   Psychiatric: He has a normal mood and affect. Judgment normal.   Vitals reviewed.    Left Lower Extremity:     Cardiovascular:    CFT brisk  to all digits  Skin temp is warm to warm from proximal tibia to distal digits  Pedal hair growth diminished.   No erythema or edema noted      Musculoskeletal:  Left hallux slightly abducted  Digits 2 through 5 left are rectus     Dermatological:   Toenails 1 through 5 are thickened, discolored   No subcutaneous nodules or masses noted    Ulcer to plantar lateral left foot measures 1.1 x 0.3 x 0.1cm.       Neurological:   Protective sensation absent        Procedures        ASSESSMENT AND PLAN    Emre was seen today for follow-up, foot ulcer and tcc change.    Diagnoses and all orders for this visit:    Diabetic ulcer of left midfoot associated with type 2 diabetes mellitus, with fat layer exposed (CMS/HCC)    Charcot's joint of left foot      - ulcer improving.  Reapplied total contact cast.  - All his questions were answered  - Return to clinic 1 week          This document has been electronically signed by Marco A Thompson DPM on August 7, 2019 12:08 PM

## 2019-08-14 ENCOUNTER — OFFICE VISIT (OUTPATIENT)
Dept: PODIATRY | Facility: CLINIC | Age: 59
End: 2019-08-14

## 2019-08-14 VITALS — HEIGHT: 75 IN | HEART RATE: 86 BPM | WEIGHT: 313 LBS | OXYGEN SATURATION: 98 % | BODY MASS INDEX: 38.92 KG/M2

## 2019-08-14 DIAGNOSIS — E11.621 DIABETIC ULCER OF LEFT MIDFOOT ASSOCIATED WITH TYPE 2 DIABETES MELLITUS, WITH FAT LAYER EXPOSED (HCC): Primary | ICD-10-CM

## 2019-08-14 DIAGNOSIS — L97.422 DIABETIC ULCER OF LEFT MIDFOOT ASSOCIATED WITH TYPE 2 DIABETES MELLITUS, WITH FAT LAYER EXPOSED (HCC): Primary | ICD-10-CM

## 2019-08-14 PROCEDURE — 29445 APPL RIGID TOT CNTC LEG CAST: CPT | Performed by: PODIATRIST

## 2019-08-14 NOTE — PROGRESS NOTES
Emre Lisa  1960  59 y.o. male   [place] - 6/13/2019  BS - 96 per patient    Patient presents today for a recheck of his left foot ulcer and TCC change.    08/14/19     Chief Complaint   Patient presents with   • Left Foot - TCC change, Foot Ulcer, Follow-up       History of Present Illness    Patient presents to clinic for follow-up of his left foot ulcer.  He is currently being treated with total contact casting with improvement.        Past Medical History:   Diagnosis Date   • Arthritis    • Atrial fibrillation (CMS/HCC)    • Diabetes mellitus (CMS/HCC)    • Diabetic foot ulcer associated with type 2 diabetes mellitus (CMS/HCC)     left great toe   • Hallux malleus of left foot    • Hammer toe    • Hypertension    • MI (myocardial infarction) (CMS/HCC)    • Neuropathy in diabetes (CMS/HCC)    • Onychomycosis          Past Surgical History:   Procedure Laterality Date   • AMPUTATION DIGIT Right 3/5/2017    Procedure: RIGHT AMPUTATION TRANSMETATRASAL , RECESSION GASTROCNEMOUS;  Surgeon: Marco A Thompson DPM;  Location: [facility];  Service:    • CARDIAC DEFIBRILLATOR PLACEMENT  2010, 2016   • CARPAL TUNNEL RELEASE  2015    elbow and wrist left arm   • FOOT IRRIGATION, DEBRIDEMENT AND REPAIR Left 3/7/2018    Procedure: FOOT IRRIGATION, DEBRIDEMENT AND REPAIR;  Surgeon: Marco A Thompson DPM;  Location: [facility];  Service:    • FOOT IRRIGATION, DEBRIDEMENT AND REPAIR Left 3/10/2018    Procedure: FOOT IRRIGATION, DEBRIDEMENT AND REPAIR;  Surgeon: Marco A Thompson DPM;  Location: [facility];  Service: Podiatry   • FOOT SURGERY     • FOOT WOUND CLOSURE Right 3/2/2017    Procedure: INCISION AND DRAINAGE, RIGHT FOOT;  Surgeon: Tung Rosenthal DPM;  Location: [facility];  Service:    • HAMMER TOE REPAIR Left 2/15/2018    Procedure: HAMMERTOE CORRECTION LEFT SECOND, THIRD AND FOURTH TOES AND HALLUX INTERPHALANGEAL JOINT ARTHRODESIS LEFT FOOT (Micro Asnis, 4.0 & 5.0 Asnis)      (c-arm);  Surgeon: Marco A Thompson  CHRISTIAN;  Location: [facility] OR;  Service:    • HARDWARE REMOVAL Left 3/5/2018    Procedure: ANKLE/FOOT HARDWARE REMOVAL;  Surgeon: Marco A Thompson DPM;  Location: [facility] OR;  Service:    • INCISION AND DRAINAGE LEG Left 3/5/2018    Procedure: INCISION AND DRAINAGE LOWER EXTREMITY;  Surgeon: Marco A Thompson DPM;  Location: [facility];  Service:    • TOE AMPUTATION Right 2016   • TONSILECTOMY, ADENOIDECTOMY, BILATERAL MYRINGOTOMY AND TUBES      age 25         Family History   Problem Relation Age of Onset   • Diabetes Father    • Stroke Father    • No Known Problems Maternal Grandmother    • No Known Problems Maternal Grandfather    • No Known Problems Paternal Grandmother    • No Known Problems Paternal Grandfather    • No Known Problems Son    • No Known Problems Daughter    • No Known Problems Daughter          Social History     Socioeconomic History   • Marital status:      Spouse name: Not on file   • Number of children: Not on file   • Years of education: Not on file   • Highest education level: Not on file   Tobacco Use   • Smoking status: Never Smoker   • Smokeless tobacco: Never Used   Substance and Sexual Activity   • Alcohol use: No   • Drug use: No   • Sexual activity: Defer         Current Outpatient Medications   Medication Sig Dispense Refill   • aspirin 81 MG chewable tablet Chew 81 mg Every Night.     • Bacitracin Zinc (MAGIC BUTT OINTMENT) Apply 1 each topically 2 (Two) Times a Day. 120 g 1   • cefdinir (OMNICEF) 300 MG capsule      • Cholecalciferol (VITAMIN D3) 5000 UNITS capsule capsule Take 5,000 Units by mouth Every Night.     • CLONIDINE HCL 5 MCG/ML ORAL SOLN Take  by mouth.     • Cyanocobalamin (VITAMIN B 12 PO) Take 1,000 mcg by mouth Every Night.     • doxycycline (VIBRAMYCIN) 100 MG capsule Take 1 capsule by mouth 2 (Two) Times a Day. 28 capsule 0   • dronedarone (MULTAQ) 400 MG tablet Take 400 mg by mouth Every Night.     • fluticasone (FLONASE) 50 MCG/ACT nasal spray 2 sprays into each  "nostril As Needed for Rhinitis.     • glimepiride (AMARYL) 2 MG tablet Take 4 mg by mouth 2 (Two) Times a Day.     • glimepiride (AMARYL) 2 MG tablet TAKE 1 TABLET BY MOUTH 2 (TWO) TIMES DAILY WITH FOOD     • HYDROcodone-acetaminophen (NORCO) 7.5-325 MG per tablet Take 1 tablet by mouth Every 6 (Six) Hours As Needed for Moderate Pain . 30 tablet 0   • losartan (COZAAR) 25 MG tablet      • magnesium oxide (MAGOX) 400 (241.3 Mg) MG tablet tablet Take 400 mg by mouth 2 (Two) Times a Day.     • metFORMIN (GLUCOPHAGE) 1000 MG tablet Take 1,000 mg by mouth 2 (Two) Times a Day With Meals. Patient states he only uses when needed     • metFORMIN (GLUCOPHAGE) 1000 MG tablet TAKE 1 TABLET BY MOUTH 2 (TWO) TIMES DAILY WITH MEALS. INDICATIONS: TYPE 2 DIABETES MELLITUS     • metoprolol succinate XL (TOPROL-XL) 25 MG 24 hr tablet      • metoprolol tartrate (LOPRESSOR) 25 MG tablet Take 12.5 mg by mouth Every Night.     • ONE TOUCH ULTRA TEST test strip USE TO TEST BLOOD SUGAR THREE TIMES A DAY  1   • rosuvastatin (CRESTOR) 10 MG tablet      • valsartan (DIOVAN) 160 MG tablet Take 40 mg by mouth Every Night. 1/2 tablet daily      • valsartan-hydrochlorothiazide (DIOVAN-HCT) 160-12.5 MG per tablet Take  by mouth.     • vitamin C (ASCORBIC ACID) 500 MG tablet Take 500 mg by mouth Every Night.     • vitamin E 200 UNIT capsule Take 200 Units by mouth Every Night.       No current facility-administered medications for this visit.            OBJECTIVE    Pulse 86   Ht 190.5 cm (75\")   Wt (!) 142 kg (313 lb)   SpO2 98%   BMI 39.12 kg/m²     Review of Systems   Constitutional: Negative.    HENT: Negative.    Respiratory: Negative.    Skin: Positive for wound.   Psychiatric/Behavioral: Negative.              Physical Exam   Constitutional: He is oriented to person, place, and time. He appears well-developed and well-nourished. No distress.   HENT:   Head: Normocephalic and atraumatic.   Pulmonary/Chest: Effort normal. No respiratory " distress.   Neurological: He is alert and oriented to person, place, and time.   Psychiatric: He has a normal mood and affect. Judgment normal.     Left Lower Extremity:     Cardiovascular:    CFT brisk  to all digits  Skin temp is warm to warm from proximal tibia to distal digits  Pedal hair growth diminished.   No erythema or edema noted      Musculoskeletal:  Left hallux slightly abducted  Digits 2 through 5 left are rectus     Dermatological:   Toenails 1 through 5 are thickened, discolored   No subcutaneous nodules or masses noted    Ulcer to plantar lateral left foot measures 0.7 x 0.2 x 0.1cm.       Neurological:   Protective sensation absent        Procedures        ASSESSMENT AND PLAN    Emre was seen today for tcc change, foot ulcer and follow-up.    Diagnoses and all orders for this visit:    Diabetic ulcer of left midfoot associated with type 2 diabetes mellitus, with fat layer exposed (CMS/HCC)      - ulcer continues to improve.  Reapplied total contact cast.  - All his questions were answered  - Return to clinic 1 week          This document has been electronically signed by Marco A Thompson DPM on August 14, 2019 8:53 AM

## 2019-08-21 ENCOUNTER — OFFICE VISIT (OUTPATIENT)
Dept: PODIATRY | Facility: CLINIC | Age: 59
End: 2019-08-21

## 2019-08-21 VITALS — WEIGHT: 313 LBS | BODY MASS INDEX: 38.92 KG/M2 | HEIGHT: 75 IN | HEART RATE: 86 BPM | OXYGEN SATURATION: 98 %

## 2019-08-21 DIAGNOSIS — L97.422 DIABETIC ULCER OF LEFT MIDFOOT ASSOCIATED WITH TYPE 2 DIABETES MELLITUS, WITH FAT LAYER EXPOSED (HCC): Primary | ICD-10-CM

## 2019-08-21 DIAGNOSIS — E11.621 DIABETIC ULCER OF LEFT MIDFOOT ASSOCIATED WITH TYPE 2 DIABETES MELLITUS, WITH FAT LAYER EXPOSED (HCC): Primary | ICD-10-CM

## 2019-08-21 DIAGNOSIS — M14.672 CHARCOT'S JOINT OF LEFT FOOT: ICD-10-CM

## 2019-08-21 PROCEDURE — 11042 DBRDMT SUBQ TIS 1ST 20SQCM/<: CPT | Performed by: PODIATRIST

## 2019-08-21 NOTE — PROGRESS NOTES
Emre Lisa  1960  59 y.o. male   [place] - 6/13/2019  BS - 106 per patient    Patient presents today for a recheck of his left foot ulcer and TCC change.    08/23/19     Chief Complaint   Patient presents with   • Left Foot - Follow-up, Foot Ulcer, TCC change       History of Present Illness    Patient presents to clinic for follow-up of his left foot ulcer.  He is currently being treated with total contact casting with improvement.        Past Medical History:   Diagnosis Date   • Arthritis    • Atrial fibrillation (CMS/HCC)    • Diabetes mellitus (CMS/HCC)    • Diabetic foot ulcer associated with type 2 diabetes mellitus (CMS/HCC)     left great toe   • Hallux malleus of left foot    • Hammer toe    • Hypertension    • MI (myocardial infarction) (CMS/[facility])    • Neuropathy in diabetes (CMS/HCC)    • Onychomycosis          Past Surgical History:   Procedure Laterality Date   • AMPUTATION DIGIT Right 3/5/2017    Procedure: RIGHT AMPUTATION TRANSMETATRASAL , RECESSION GASTROCNEMOUS;  Surgeon: Marco A Thompson DPM;  Location: [facility];  Service:    • CARDIAC DEFIBRILLATOR PLACEMENT  2010, 2016   • CARPAL TUNNEL RELEASE  2015    elbow and wrist left arm   • FOOT IRRIGATION, DEBRIDEMENT AND REPAIR Left 3/7/2018    Procedure: FOOT IRRIGATION, DEBRIDEMENT AND REPAIR;  Surgeon: Marco A Thompson DPM;  Location: [facility];  Service:    • FOOT IRRIGATION, DEBRIDEMENT AND REPAIR Left 3/10/2018    Procedure: FOOT IRRIGATION, DEBRIDEMENT AND REPAIR;  Surgeon: Marco A Thompson DPM;  Location: [facility];  Service: Podiatry   • FOOT SURGERY     • FOOT WOUND CLOSURE Right 3/2/2017    Procedure: INCISION AND DRAINAGE, RIGHT FOOT;  Surgeon: Tung Rosenthal DPM;  Location: [facility];  Service:    • HAMMER TOE REPAIR Left 2/15/2018    Procedure: HAMMERTOE CORRECTION LEFT SECOND, THIRD AND FOURTH TOES AND HALLUX INTERPHALANGEAL JOINT ARTHRODESIS LEFT FOOT (Micro Asnis, 4.0 & 5.0 Asnis)      (c-arm);  Surgeon: Marco A Thompson  CHRISTIAN;  Location: [facility] OR;  Service:    • HARDWARE REMOVAL Left 3/5/2018    Procedure: ANKLE/FOOT HARDWARE REMOVAL;  Surgeon: Marco A Thompson DPM;  Location: [facility] OR;  Service:    • INCISION AND DRAINAGE LEG Left 3/5/2018    Procedure: INCISION AND DRAINAGE LOWER EXTREMITY;  Surgeon: Marco A Thompson DPM;  Location: [facility];  Service:    • TOE AMPUTATION Right 2016   • TONSILECTOMY, ADENOIDECTOMY, BILATERAL MYRINGOTOMY AND TUBES      age 25         Family History   Problem Relation Age of Onset   • Diabetes Father    • Stroke Father    • No Known Problems Maternal Grandmother    • No Known Problems Maternal Grandfather    • No Known Problems Paternal Grandmother    • No Known Problems Paternal Grandfather    • No Known Problems Son    • No Known Problems Daughter    • No Known Problems Daughter          Social History     Socioeconomic History   • Marital status:      Spouse name: Not on file   • Number of children: Not on file   • Years of education: Not on file   • Highest education level: Not on file   Tobacco Use   • Smoking status: Never Smoker   • Smokeless tobacco: Never Used   Substance and Sexual Activity   • Alcohol use: No   • Drug use: No   • Sexual activity: Defer         Current Outpatient Medications   Medication Sig Dispense Refill   • aspirin 81 MG chewable tablet Chew 81 mg Every Night.     • Bacitracin Zinc (MAGIC BUTT OINTMENT) Apply 1 each topically 2 (Two) Times a Day. 120 g 1   • cefdinir (OMNICEF) 300 MG capsule      • Cholecalciferol (VITAMIN D3) 5000 UNITS capsule capsule Take 5,000 Units by mouth Every Night.     • CLONIDINE HCL 5 MCG/ML ORAL SOLN Take  by mouth.     • Cyanocobalamin (VITAMIN B 12 PO) Take 1,000 mcg by mouth Every Night.     • doxycycline (VIBRAMYCIN) 100 MG capsule Take 1 capsule by mouth 2 (Two) Times a Day. 28 capsule 0   • dronedarone (MULTAQ) 400 MG tablet Take 400 mg by mouth Every Night.     • fluticasone (FLONASE) 50 MCG/ACT nasal spray 2 sprays into each  "nostril As Needed for Rhinitis.     • glimepiride (AMARYL) 2 MG tablet Take 4 mg by mouth 2 (Two) Times a Day.     • glimepiride (AMARYL) 2 MG tablet TAKE 1 TABLET BY MOUTH 2 (TWO) TIMES DAILY WITH FOOD     • HYDROcodone-acetaminophen (NORCO) 7.5-325 MG per tablet Take 1 tablet by mouth Every 6 (Six) Hours As Needed for Moderate Pain . 30 tablet 0   • losartan (COZAAR) 25 MG tablet      • magnesium oxide (MAGOX) 400 (241.3 Mg) MG tablet tablet Take 400 mg by mouth 2 (Two) Times a Day.     • metFORMIN (GLUCOPHAGE) 1000 MG tablet Take 1,000 mg by mouth 2 (Two) Times a Day With Meals. Patient states he only uses when needed     • metFORMIN (GLUCOPHAGE) 1000 MG tablet TAKE 1 TABLET BY MOUTH 2 (TWO) TIMES DAILY WITH MEALS. INDICATIONS: TYPE 2 DIABETES MELLITUS     • metoprolol succinate XL (TOPROL-XL) 25 MG 24 hr tablet      • metoprolol tartrate (LOPRESSOR) 25 MG tablet Take 12.5 mg by mouth Every Night.     • ONE TOUCH ULTRA TEST test strip USE TO TEST BLOOD SUGAR THREE TIMES A DAY  1   • rosuvastatin (CRESTOR) 10 MG tablet      • valsartan (DIOVAN) 160 MG tablet Take 40 mg by mouth Every Night. 1/2 tablet daily      • valsartan-hydrochlorothiazide (DIOVAN-HCT) 160-12.5 MG per tablet Take  by mouth.     • vitamin C (ASCORBIC ACID) 500 MG tablet Take 500 mg by mouth Every Night.     • vitamin E 200 UNIT capsule Take 200 Units by mouth Every Night.       No current facility-administered medications for this visit.            OBJECTIVE    Pulse 86   Ht 190.5 cm (75\")   Wt (!) 142 kg (313 lb)   SpO2 98%   BMI 39.12 kg/m²     Review of Systems   Constitutional: Negative.    HENT: Negative.    Eyes: Negative.    Respiratory: Negative.    Endocrine: Negative.    Genitourinary: Negative.    Musculoskeletal: Negative.    Skin: Positive for wound.   Psychiatric/Behavioral: Negative.              Physical Exam   Constitutional: He is oriented to person, place, and time. He appears well-developed and well-nourished. No " distress.   HENT:   Head: Normocephalic and atraumatic.   Pulmonary/Chest: Effort normal. No respiratory distress.   Neurological: He is alert and oriented to person, place, and time.   Psychiatric: He has a normal mood and affect. Judgment normal.     Left Lower Extremity:     Cardiovascular:    CFT brisk  to all digits  Skin temp is warm to warm from proximal tibia to distal digits  Pedal hair growth diminished.   No erythema or edema noted   Musculoskeletal:  Left hallux slightly abducted  Digits 2 through 5 left are rectus  Dermatological:   Toenails 1 through 5 are thickened, discolored   No subcutaneous nodules or masses noted    Ulcer to plantar lateral left foot measures 1.0 x 0.3 x 0.2cm   Neurological:   Protective sensation absent        Procedures        ASSESSMENT AND PLAN    Emre was seen today for follow-up, foot ulcer and tcc change.    Diagnoses and all orders for this visit:    Diabetic ulcer of left midfoot associated with type 2 diabetes mellitus, with fat layer exposed (CMS/HCC)    Charcot's joint of left foot      -Wound noted in objective was excisionally debrided with tissue nipper and dermal curette.  Post debridement there is a full-thickness ulceration measuring 1.3 x 0.4 x 0.2 cm.  Dressing applied followed by total contact cast.  - All his questions were answered  - Return to clinic 1 week          This document has been electronically signed by Marco A Thomspon DPM on August 23, 2019 8:29 AM

## 2019-08-28 ENCOUNTER — OFFICE VISIT (OUTPATIENT)
Dept: PODIATRY | Facility: CLINIC | Age: 59
End: 2019-08-28

## 2019-08-28 VITALS — HEART RATE: 97 BPM | WEIGHT: 313 LBS | HEIGHT: 75 IN | BODY MASS INDEX: 38.92 KG/M2 | OXYGEN SATURATION: 99 %

## 2019-08-28 DIAGNOSIS — L97.422 DIABETIC ULCER OF LEFT MIDFOOT ASSOCIATED WITH TYPE 2 DIABETES MELLITUS, WITH FAT LAYER EXPOSED (HCC): Primary | ICD-10-CM

## 2019-08-28 DIAGNOSIS — E11.621 DIABETIC ULCER OF LEFT MIDFOOT ASSOCIATED WITH TYPE 2 DIABETES MELLITUS, WITH FAT LAYER EXPOSED (HCC): Primary | ICD-10-CM

## 2019-08-28 PROCEDURE — 29445 APPL RIGID TOT CNTC LEG CAST: CPT | Performed by: PODIATRIST

## 2019-08-28 NOTE — PROGRESS NOTES
Emre Lisa  1960  59 y.o. male   [place] - 6/13/2019  BS - 103 per patient    Patient presents today for a recheck of his left foot ulcer and TCC change. 8/28/19 08/28/19     Chief Complaint   Patient presents with   • Left Foot - Follow-up       History of Present Illness    Patient presents to clinic for follow-up of his left foot ulcer.         Past Medical History:   Diagnosis Date   • Arthritis    • Atrial fibrillation (CMS/HCC)    • Diabetes mellitus (CMS/HCC)    • Diabetic foot ulcer associated with type 2 diabetes mellitus (CMS/HCC)     left great toe   • Hallux malleus of left foot    • Hammer toe    • Hypertension    • MI (myocardial infarction) (CMS/HCC)    • Neuropathy in diabetes (CMS/HCC)    • Onychomycosis          Past Surgical History:   Procedure Laterality Date   • AMPUTATION DIGIT Right 3/5/2017    Procedure: RIGHT AMPUTATION TRANSMETATRASAL , RECESSION GASTROCNEMOUS;  Surgeon: Marco A Thompson DPM;  Location: [facility];  Service:    • CARDIAC DEFIBRILLATOR PLACEMENT  2010, 2016   • CARPAL TUNNEL RELEASE  2015    elbow and wrist left arm   • FOOT IRRIGATION, DEBRIDEMENT AND REPAIR Left 3/7/2018    Procedure: FOOT IRRIGATION, DEBRIDEMENT AND REPAIR;  Surgeon: Marco A Thompson DPM;  Location: [facility];  Service:    • FOOT IRRIGATION, DEBRIDEMENT AND REPAIR Left 3/10/2018    Procedure: FOOT IRRIGATION, DEBRIDEMENT AND REPAIR;  Surgeon: Marco A Thompson DPM;  Location: [facility];  Service: Podiatry   • FOOT SURGERY     • FOOT WOUND CLOSURE Right 3/2/2017    Procedure: INCISION AND DRAINAGE, RIGHT FOOT;  Surgeon: Tung Rosenthal DPM;  Location: [facility] OR;  Service:    • HAMMER TOE REPAIR Left 2/15/2018    Procedure: HAMMERTOE CORRECTION LEFT SECOND, THIRD AND FOURTH TOES AND HALLUX INTERPHALANGEAL JOINT ARTHRODESIS LEFT FOOT (Micro Asnis, 4.0 & 5.0 Asnis)      (c-arm);  Surgeon: Marco A Thompson DPM;  Location: [facility] OR;  Service:    • HARDWARE REMOVAL Left 3/5/2018    Procedure:  ANKLE/FOOT HARDWARE REMOVAL;  Surgeon: Marco A Thompson DPM;  Location: Health system;  Service:    • INCISION AND DRAINAGE LEG Left 3/5/2018    Procedure: INCISION AND DRAINAGE LOWER EXTREMITY;  Surgeon: Marco A Thompson DPM;  Location: Health system;  Service:    • TOE AMPUTATION Right 2016   • TONSILECTOMY, ADENOIDECTOMY, BILATERAL MYRINGOTOMY AND TUBES      age 25         Family History   Problem Relation Age of Onset   • Diabetes Father    • Stroke Father    • No Known Problems Maternal Grandmother    • No Known Problems Maternal Grandfather    • No Known Problems Paternal Grandmother    • No Known Problems Paternal Grandfather    • No Known Problems Son    • No Known Problems Daughter    • No Known Problems Daughter          Social History     Socioeconomic History   • Marital status:      Spouse name: Not on file   • Number of children: Not on file   • Years of education: Not on file   • Highest education level: Not on file   Tobacco Use   • Smoking status: Never Smoker   • Smokeless tobacco: Never Used   Substance and Sexual Activity   • Alcohol use: No   • Drug use: No   • Sexual activity: Defer         Current Outpatient Medications   Medication Sig Dispense Refill   • aspirin 81 MG chewable tablet Chew 81 mg Every Night.     • Bacitracin Zinc (MAGIC BUTT OINTMENT) Apply 1 each topically 2 (Two) Times a Day. 120 g 1   • cefdinir (OMNICEF) 300 MG capsule      • Cholecalciferol (VITAMIN D3) 5000 UNITS capsule capsule Take 5,000 Units by mouth Every Night.     • CLONIDINE HCL 5 MCG/ML ORAL SOLN Take  by mouth.     • Cyanocobalamin (VITAMIN B 12 PO) Take 1,000 mcg by mouth Every Night.     • doxycycline (VIBRAMYCIN) 100 MG capsule Take 1 capsule by mouth 2 (Two) Times a Day. 28 capsule 0   • dronedarone (MULTAQ) 400 MG tablet Take 400 mg by mouth Every Night.     • fluticasone (FLONASE) 50 MCG/ACT nasal spray 2 sprays into each nostril As Needed for Rhinitis.     • glimepiride (AMARYL) 2 MG tablet Take 4 mg by  "mouth 2 (Two) Times a Day.     • glimepiride (AMARYL) 2 MG tablet TAKE 1 TABLET BY MOUTH 2 (TWO) TIMES DAILY WITH FOOD     • HYDROcodone-acetaminophen (NORCO) 7.5-325 MG per tablet Take 1 tablet by mouth Every 6 (Six) Hours As Needed for Moderate Pain . 30 tablet 0   • losartan (COZAAR) 25 MG tablet      • magnesium oxide (MAGOX) 400 (241.3 Mg) MG tablet tablet Take 400 mg by mouth 2 (Two) Times a Day.     • metFORMIN (GLUCOPHAGE) 1000 MG tablet Take 1,000 mg by mouth 2 (Two) Times a Day With Meals. Patient states he only uses when needed     • metFORMIN (GLUCOPHAGE) 1000 MG tablet TAKE 1 TABLET BY MOUTH 2 (TWO) TIMES DAILY WITH MEALS. INDICATIONS: TYPE 2 DIABETES MELLITUS     • metoprolol succinate XL (TOPROL-XL) 25 MG 24 hr tablet      • metoprolol tartrate (LOPRESSOR) 25 MG tablet Take 12.5 mg by mouth Every Night.     • ONE TOUCH ULTRA TEST test strip USE TO TEST BLOOD SUGAR THREE TIMES A DAY  1   • rosuvastatin (CRESTOR) 10 MG tablet      • valsartan (DIOVAN) 160 MG tablet Take 40 mg by mouth Every Night. 1/2 tablet daily      • valsartan-hydrochlorothiazide (DIOVAN-HCT) 160-12.5 MG per tablet Take  by mouth.     • vitamin C (ASCORBIC ACID) 500 MG tablet Take 500 mg by mouth Every Night.     • vitamin E 200 UNIT capsule Take 200 Units by mouth Every Night.       No current facility-administered medications for this visit.            OBJECTIVE    Pulse 97   Ht 190.5 cm (75\")   Wt (!) 142 kg (313 lb)   SpO2 99%   BMI 39.12 kg/m²     Review of Systems   Constitutional: Negative.    HENT: Negative.    Eyes: Negative.    Respiratory: Negative.    Musculoskeletal: Negative.    Skin: Positive for wound.   Psychiatric/Behavioral: Negative.              Physical Exam   Constitutional: He is oriented to person, place, and time. He appears well-developed and well-nourished. No distress.   HENT:   Head: Normocephalic and atraumatic.   Pulmonary/Chest: Effort normal. No respiratory distress.   Neurological: He is alert " and oriented to person, place, and time.   Psychiatric: He has a normal mood and affect. Judgment normal.     Left Lower Extremity:     Cardiovascular:    CFT brisk  to all digits  Skin temp is warm to warm from proximal tibia to distal digits  Pedal hair growth diminished.   No erythema or edema noted   Musculoskeletal:  Left hallux slightly abducted  Digits 2 through 5 left are rectus  Dermatological:   Toenails 1 through 5 are thickened, discolored   No subcutaneous nodules or masses noted    Ulcer to plantar lateral left foot measures 1.0 x 0.3 x 0.1cm   Neurological:   Protective sensation absent        Procedures        ASSESSMENT AND PLAN    Emre was seen today for follow-up.    Diagnoses and all orders for this visit:    Diabetic ulcer of left midfoot associated with type 2 diabetes mellitus, with fat layer exposed (CMS/HCC)      -Foot ulcer has improved.  Reapplied total contact cast.  - All his questions were answered  - Return to clinic 1 week          This document has been electronically signed by Marco A Thompson DPM on August 28, 2019 12:50 PM

## 2019-09-04 ENCOUNTER — OFFICE VISIT (OUTPATIENT)
Dept: PODIATRY | Facility: CLINIC | Age: 59
End: 2019-09-04

## 2019-09-04 VITALS — HEIGHT: 75 IN | BODY MASS INDEX: 38.92 KG/M2 | WEIGHT: 313 LBS | OXYGEN SATURATION: 98 % | HEART RATE: 88 BPM

## 2019-09-04 DIAGNOSIS — L97.422 DIABETIC ULCER OF LEFT MIDFOOT ASSOCIATED WITH TYPE 2 DIABETES MELLITUS, WITH FAT LAYER EXPOSED (HCC): Primary | ICD-10-CM

## 2019-09-04 DIAGNOSIS — E11.621 DIABETIC ULCER OF LEFT MIDFOOT ASSOCIATED WITH TYPE 2 DIABETES MELLITUS, WITH FAT LAYER EXPOSED (HCC): Primary | ICD-10-CM

## 2019-09-04 PROCEDURE — 29445 APPL RIGID TOT CNTC LEG CAST: CPT | Performed by: PODIATRIST

## 2019-09-04 NOTE — PROGRESS NOTES
Emre Lisa  1960  59 y.o. male   [place] - 6/13/2019  BS - 93 per patient    Patient presents today for a recheck of his left foot ulcer and TCC change.     09/04/19     Chief Complaint   Patient presents with   • Left Foot - Follow-up       History of Present Illness    Patient presents to clinic for follow-up of his left foot ulcer.  Currently being treated with total contact cast.  Ulcer is improving.      Past Medical History:   Diagnosis Date   • Arthritis    • Atrial fibrillation (CMS/HCC)    • Diabetes mellitus (CMS/HCC)    • Diabetic foot ulcer associated with type 2 diabetes mellitus (CMS/HCC)     left great toe   • Hallux malleus of left foot    • Hammer toe    • Hypertension    • MI (myocardial infarction) (CMS/HCC)    • Neuropathy in diabetes (CMS/HCC)    • Onychomycosis          Past Surgical History:   Procedure Laterality Date   • AMPUTATION DIGIT Right 3/5/2017    Procedure: RIGHT AMPUTATION TRANSMETATRASAL , RECESSION GASTROCNEMOUS;  Surgeon: Marco A Thompson DPM;  Location: [facility];  Service:    • CARDIAC DEFIBRILLATOR PLACEMENT  2010, 2016   • CARPAL TUNNEL RELEASE  2015    elbow and wrist left arm   • FOOT IRRIGATION, DEBRIDEMENT AND REPAIR Left 3/7/2018    Procedure: FOOT IRRIGATION, DEBRIDEMENT AND REPAIR;  Surgeon: Marco A Thompson DPM;  Location: [facility] OR;  Service:    • FOOT IRRIGATION, DEBRIDEMENT AND REPAIR Left 3/10/2018    Procedure: FOOT IRRIGATION, DEBRIDEMENT AND REPAIR;  Surgeon: Marco A Thompson DPM;  Location: [facility] OR;  Service: Podiatry   • FOOT SURGERY     • FOOT WOUND CLOSURE Right 3/2/2017    Procedure: INCISION AND DRAINAGE, RIGHT FOOT;  Surgeon: Tung Rosenthal DPM;  Location: [facility] OR;  Service:    • HAMMER TOE REPAIR Left 2/15/2018    Procedure: HAMMERTOE CORRECTION LEFT SECOND, THIRD AND FOURTH TOES AND HALLUX INTERPHALANGEAL JOINT ARTHRODESIS LEFT FOOT (Micro Asnis, 4.0 & 5.0 Asnis)      (c-arm);  Surgeon: Marco A Thompson DPM;  Location: [facility] OR;   Service:    • HARDWARE REMOVAL Left 3/5/2018    Procedure: ANKLE/FOOT HARDWARE REMOVAL;  Surgeon: Marco A Thompson DPM;  Location: [facility] OR;  Service:    • INCISION AND DRAINAGE LEG Left 3/5/2018    Procedure: INCISION AND DRAINAGE LOWER EXTREMITY;  Surgeon: Marco A Thompson DPM;  Location: [facility];  Service:    • TOE AMPUTATION Right 2016   • TONSILECTOMY, ADENOIDECTOMY, BILATERAL MYRINGOTOMY AND TUBES      age 25         Family History   Problem Relation Age of Onset   • Diabetes Father    • Stroke Father    • No Known Problems Maternal Grandmother    • No Known Problems Maternal Grandfather    • No Known Problems Paternal Grandmother    • No Known Problems Paternal Grandfather    • No Known Problems Son    • No Known Problems Daughter    • No Known Problems Daughter          Social History     Socioeconomic History   • Marital status:      Spouse name: Not on file   • Number of children: Not on file   • Years of education: Not on file   • Highest education level: Not on file   Tobacco Use   • Smoking status: Never Smoker   • Smokeless tobacco: Never Used   Substance and Sexual Activity   • Alcohol use: No   • Drug use: No   • Sexual activity: Defer         Current Outpatient Medications   Medication Sig Dispense Refill   • aspirin 81 MG chewable tablet Chew 81 mg Every Night.     • Bacitracin Zinc (MAGIC BUTT OINTMENT) Apply 1 each topically 2 (Two) Times a Day. 120 g 1   • cefdinir (OMNICEF) 300 MG capsule      • Cholecalciferol (VITAMIN D3) 5000 UNITS capsule capsule Take 5,000 Units by mouth Every Night.     • CLONIDINE HCL 5 MCG/ML ORAL SOLN Take  by mouth.     • Cyanocobalamin (VITAMIN B 12 PO) Take 1,000 mcg by mouth Every Night.     • doxycycline (VIBRAMYCIN) 100 MG capsule Take 1 capsule by mouth 2 (Two) Times a Day. 28 capsule 0   • dronedarone (MULTAQ) 400 MG tablet Take 400 mg by mouth Every Night.     • fluticasone (FLONASE) 50 MCG/ACT nasal spray 2 sprays into each nostril As Needed for  "Rhinitis.     • glimepiride (AMARYL) 2 MG tablet Take 4 mg by mouth 2 (Two) Times a Day.     • glimepiride (AMARYL) 2 MG tablet TAKE 1 TABLET BY MOUTH 2 (TWO) TIMES DAILY WITH FOOD     • HYDROcodone-acetaminophen (NORCO) 7.5-325 MG per tablet Take 1 tablet by mouth Every 6 (Six) Hours As Needed for Moderate Pain . 30 tablet 0   • losartan (COZAAR) 25 MG tablet      • magnesium oxide (MAGOX) 400 (241.3 Mg) MG tablet tablet Take 400 mg by mouth 2 (Two) Times a Day.     • metFORMIN (GLUCOPHAGE) 1000 MG tablet Take 1,000 mg by mouth 2 (Two) Times a Day With Meals. Patient states he only uses when needed     • metFORMIN (GLUCOPHAGE) 1000 MG tablet TAKE 1 TABLET BY MOUTH 2 (TWO) TIMES DAILY WITH MEALS. INDICATIONS: TYPE 2 DIABETES MELLITUS     • metoprolol succinate XL (TOPROL-XL) 25 MG 24 hr tablet      • metoprolol tartrate (LOPRESSOR) 25 MG tablet Take 12.5 mg by mouth Every Night.     • ONE TOUCH ULTRA TEST test strip USE TO TEST BLOOD SUGAR THREE TIMES A DAY  1   • rosuvastatin (CRESTOR) 10 MG tablet      • valsartan (DIOVAN) 160 MG tablet Take 40 mg by mouth Every Night. 1/2 tablet daily      • valsartan-hydrochlorothiazide (DIOVAN-HCT) 160-12.5 MG per tablet Take  by mouth.     • vitamin C (ASCORBIC ACID) 500 MG tablet Take 500 mg by mouth Every Night.     • vitamin E 200 UNIT capsule Take 200 Units by mouth Every Night.       No current facility-administered medications for this visit.            OBJECTIVE    Pulse 88   Ht 190.5 cm (75\")   Wt (!) 142 kg (313 lb)   SpO2 98%   BMI 39.12 kg/m²     Review of Systems   Constitutional: Negative.    HENT: Negative.    Eyes: Negative.    Respiratory: Negative.    Musculoskeletal: Negative.    Skin: Positive for wound.   Psychiatric/Behavioral: Negative.              Physical Exam   Constitutional: He is oriented to person, place, and time. He appears well-developed and well-nourished. No distress.   HENT:   Head: Normocephalic and atraumatic.   Pulmonary/Chest: Effort " normal. No respiratory distress.   Neurological: He is alert and oriented to person, place, and time.   Psychiatric: He has a normal mood and affect. Judgment normal.     Left Lower Extremity:     Cardiovascular:    CFT brisk  to all digits  Skin temp is warm to warm from proximal tibia to distal digits  Pedal hair growth diminished.   No erythema or edema noted   Musculoskeletal:  Left hallux slightly abducted  Digits 2 through 5 left are rectus  Dermatological:   Toenails 1 through 5 are thickened, discolored   No subcutaneous nodules or masses noted    Ulcer to plantar lateral left foot measures 0.8 x 0.2 x 0.1cm   Neurological:   Protective sensation absent        Procedures        ASSESSMENT AND PLAN    Emre was seen today for follow-up.    Diagnoses and all orders for this visit:    Diabetic ulcer of left midfoot associated with type 2 diabetes mellitus, with fat layer exposed (CMS/HCC)      -Foot ulcer continues to improve.  Reapplied total contact cast.  - All his questions were answered  - Return to clinic 1 week          This document has been electronically signed by Marco A Thompson DPM on September 4, 2019 9:13 AM

## 2019-09-11 ENCOUNTER — OFFICE VISIT (OUTPATIENT)
Dept: PODIATRY | Facility: CLINIC | Age: 59
End: 2019-09-11

## 2019-09-11 VITALS — WEIGHT: 313 LBS | HEART RATE: 85 BPM | HEIGHT: 75 IN | BODY MASS INDEX: 38.92 KG/M2 | OXYGEN SATURATION: 95 %

## 2019-09-11 DIAGNOSIS — E11.621 DIABETIC ULCER OF LEFT MIDFOOT ASSOCIATED WITH TYPE 2 DIABETES MELLITUS, WITH FAT LAYER EXPOSED (HCC): Primary | ICD-10-CM

## 2019-09-11 DIAGNOSIS — M14.672 CHARCOT'S JOINT OF LEFT FOOT: ICD-10-CM

## 2019-09-11 DIAGNOSIS — L97.422 DIABETIC ULCER OF LEFT MIDFOOT ASSOCIATED WITH TYPE 2 DIABETES MELLITUS, WITH FAT LAYER EXPOSED (HCC): Primary | ICD-10-CM

## 2019-09-11 PROCEDURE — 29445 APPL RIGID TOT CNTC LEG CAST: CPT | Performed by: PODIATRIST

## 2019-09-11 NOTE — PROGRESS NOTES
Emre Lisa  1960  59 y.o. male   [place] - 6/13/2019  BS - 108 per patient    Patient presents today for a recheck of his left foot ulcer and TCC change.     09/11/19     Chief Complaint   Patient presents with   • Left Foot - Follow-up, TCC change, Foot Ulcer       History of Present Illness    Patient presents to clinic for follow-up of his left foot ulcer.   He is ambulating in total contact cast.      Past Medical History:   Diagnosis Date   • Arthritis    • Atrial fibrillation (CMS/HCC)    • Diabetes mellitus (CMS/HCC)    • Diabetic foot ulcer associated with type 2 diabetes mellitus (CMS/HCC)     left great toe   • Hallux malleus of left foot    • Hammer toe    • Hypertension    • MI (myocardial infarction) (CMS/HCC)    • Neuropathy in diabetes (CMS/HCC)    • Onychomycosis          Past Surgical History:   Procedure Laterality Date   • AMPUTATION DIGIT Right 3/5/2017    Procedure: RIGHT AMPUTATION TRANSMETATRASAL , RECESSION GASTROCNEMOUS;  Surgeon: Marco A Thopmson DPM;  Location: [facility];  Service:    • CARDIAC DEFIBRILLATOR PLACEMENT  2010, 2016   • CARPAL TUNNEL RELEASE  2015    elbow and wrist left arm   • FOOT IRRIGATION, DEBRIDEMENT AND REPAIR Left 3/7/2018    Procedure: FOOT IRRIGATION, DEBRIDEMENT AND REPAIR;  Surgeon: Marco A Thompson DPM;  Location: [facility];  Service:    • FOOT IRRIGATION, DEBRIDEMENT AND REPAIR Left 3/10/2018    Procedure: FOOT IRRIGATION, DEBRIDEMENT AND REPAIR;  Surgeon: Marco A Thompson DPM;  Location: [facility];  Service: Podiatry   • FOOT SURGERY     • FOOT WOUND CLOSURE Right 3/2/2017    Procedure: INCISION AND DRAINAGE, RIGHT FOOT;  Surgeon: Tung Rosenthal DPM;  Location: [facility];  Service:    • HAMMER TOE REPAIR Left 2/15/2018    Procedure: HAMMERTOE CORRECTION LEFT SECOND, THIRD AND FOURTH TOES AND HALLUX INTERPHALANGEAL JOINT ARTHRODESIS LEFT FOOT (Micro Asnis, 4.0 & 5.0 Asnis)      (c-arm);  Surgeon: Marco A Thompson DPM;  Location: [facility] OR;   Service:    • HARDWARE REMOVAL Left 3/5/2018    Procedure: ANKLE/FOOT HARDWARE REMOVAL;  Surgeon: Marco A Thompson DPM;  Location: [facility] OR;  Service:    • INCISION AND DRAINAGE LEG Left 3/5/2018    Procedure: INCISION AND DRAINAGE LOWER EXTREMITY;  Surgeon: Marco A Thompson DPM;  Location: [facility];  Service:    • TOE AMPUTATION Right 2016   • TONSILECTOMY, ADENOIDECTOMY, BILATERAL MYRINGOTOMY AND TUBES      age 25         Family History   Problem Relation Age of Onset   • Diabetes Father    • Stroke Father    • No Known Problems Maternal Grandmother    • No Known Problems Maternal Grandfather    • No Known Problems Paternal Grandmother    • No Known Problems Paternal Grandfather    • No Known Problems Son    • No Known Problems Daughter    • No Known Problems Daughter          Social History     Socioeconomic History   • Marital status:      Spouse name: Not on file   • Number of children: Not on file   • Years of education: Not on file   • Highest education level: Not on file   Tobacco Use   • Smoking status: Never Smoker   • Smokeless tobacco: Never Used   Substance and Sexual Activity   • Alcohol use: No   • Drug use: No   • Sexual activity: Defer         Current Outpatient Medications   Medication Sig Dispense Refill   • aspirin 81 MG chewable tablet Chew 81 mg Every Night.     • Bacitracin Zinc (MAGIC BUTT OINTMENT) Apply 1 each topically 2 (Two) Times a Day. 120 g 1   • cefdinir (OMNICEF) 300 MG capsule      • Cholecalciferol (VITAMIN D3) 5000 UNITS capsule capsule Take 5,000 Units by mouth Every Night.     • CLONIDINE HCL 5 MCG/ML ORAL SOLN Take  by mouth.     • Cyanocobalamin (VITAMIN B 12 PO) Take 1,000 mcg by mouth Every Night.     • doxycycline (VIBRAMYCIN) 100 MG capsule Take 1 capsule by mouth 2 (Two) Times a Day. 28 capsule 0   • dronedarone (MULTAQ) 400 MG tablet Take 400 mg by mouth Every Night.     • fluticasone (FLONASE) 50 MCG/ACT nasal spray 2 sprays into each nostril As Needed for  "Rhinitis.     • glimepiride (AMARYL) 2 MG tablet Take 4 mg by mouth 2 (Two) Times a Day.     • glimepiride (AMARYL) 2 MG tablet TAKE 1 TABLET BY MOUTH 2 (TWO) TIMES DAILY WITH FOOD     • HYDROcodone-acetaminophen (NORCO) 7.5-325 MG per tablet Take 1 tablet by mouth Every 6 (Six) Hours As Needed for Moderate Pain . 30 tablet 0   • losartan (COZAAR) 25 MG tablet      • magnesium oxide (MAGOX) 400 (241.3 Mg) MG tablet tablet Take 400 mg by mouth 2 (Two) Times a Day.     • metFORMIN (GLUCOPHAGE) 1000 MG tablet Take 1,000 mg by mouth 2 (Two) Times a Day With Meals. Patient states he only uses when needed     • metFORMIN (GLUCOPHAGE) 1000 MG tablet TAKE 1 TABLET BY MOUTH 2 (TWO) TIMES DAILY WITH MEALS. INDICATIONS: TYPE 2 DIABETES MELLITUS     • metoprolol succinate XL (TOPROL-XL) 25 MG 24 hr tablet      • metoprolol tartrate (LOPRESSOR) 25 MG tablet Take 12.5 mg by mouth Every Night.     • ONE TOUCH ULTRA TEST test strip USE TO TEST BLOOD SUGAR THREE TIMES A DAY  1   • rosuvastatin (CRESTOR) 10 MG tablet      • valsartan (DIOVAN) 160 MG tablet Take 40 mg by mouth Every Night. 1/2 tablet daily      • valsartan-hydrochlorothiazide (DIOVAN-HCT) 160-12.5 MG per tablet Take  by mouth.     • vitamin C (ASCORBIC ACID) 500 MG tablet Take 500 mg by mouth Every Night.     • vitamin E 200 UNIT capsule Take 200 Units by mouth Every Night.       No current facility-administered medications for this visit.            OBJECTIVE    Pulse 85   Ht 190.5 cm (75\")   Wt (!) 142 kg (313 lb)   SpO2 95%   BMI 39.12 kg/m²     Review of Systems   Constitutional: Negative.    Respiratory: Negative.    Gastrointestinal: Negative.    Skin: Positive for wound.   Psychiatric/Behavioral: Negative.              Physical Exam   Constitutional: He is oriented to person, place, and time. He appears well-developed and well-nourished. No distress.   HENT:   Head: Normocephalic and atraumatic.   Pulmonary/Chest: Effort normal. No respiratory distress. "   Musculoskeletal: Normal range of motion. He exhibits deformity. He exhibits no tenderness.   Neurological: He is alert and oriented to person, place, and time.   Psychiatric: He has a normal mood and affect. Judgment normal.     Left Lower Extremity:     Cardiovascular:    CFT brisk  to all digits  Skin temp is warm to warm from proximal tibia to distal digits  Pedal hair growth diminished.   No erythema or edema noted   Musculoskeletal:  Left hallux slightly abducted  Digits 2 through 5 left are rectus  Dermatological:   Toenails 1 through 5 are thickened, discolored   No subcutaneous nodules or masses noted    Ulcer to plantar lateral left foot measures 0.7 x 0.1 x 0.1cm   Neurological:   Protective sensation absent        Procedures        ASSESSMENT AND PLAN    Emre was seen today for follow-up, tcc change and foot ulcer.    Diagnoses and all orders for this visit:    Diabetic ulcer of left midfoot associated with type 2 diabetes mellitus, with fat layer exposed (CMS/HCC)    Charcot's joint of left foot      -Foot ulcer continues to improve.  Reapplied total contact cast.  - All his questions were answered  - Return to clinic 1 week          This document has been electronically signed by Marco A Thompson DPM on September 11, 2019 8:45 AM

## 2019-09-18 ENCOUNTER — OFFICE VISIT (OUTPATIENT)
Dept: PODIATRY | Facility: CLINIC | Age: 59
End: 2019-09-18

## 2019-09-18 VITALS — HEIGHT: 75 IN | WEIGHT: 313 LBS | HEART RATE: 85 BPM | OXYGEN SATURATION: 97 % | BODY MASS INDEX: 38.92 KG/M2

## 2019-09-18 DIAGNOSIS — E11.621 DIABETIC ULCER OF LEFT MIDFOOT ASSOCIATED WITH TYPE 2 DIABETES MELLITUS, WITH FAT LAYER EXPOSED (HCC): Primary | ICD-10-CM

## 2019-09-18 DIAGNOSIS — L97.422 DIABETIC ULCER OF LEFT MIDFOOT ASSOCIATED WITH TYPE 2 DIABETES MELLITUS, WITH FAT LAYER EXPOSED (HCC): Primary | ICD-10-CM

## 2019-09-18 DIAGNOSIS — M14.672 CHARCOT'S JOINT OF LEFT FOOT: ICD-10-CM

## 2019-09-18 PROCEDURE — 11042 DBRDMT SUBQ TIS 1ST 20SQCM/<: CPT | Performed by: PODIATRIST

## 2019-09-18 NOTE — PROGRESS NOTES
Emre Lisa  1960  59 y.o. male   [place] - 6/13/2019  BS - 112 per patient    Patient presents today for a recheck of his left foot ulcer and TCC change.     09/18/19     Chief Complaint   Patient presents with   • Left Foot - TCC change, Follow-up, Foot Ulcer       History of Present Illness    Patient presents to clinic for follow-up of his left foot ulcer.   He is ambulating in total contact cast.      Past Medical History:   Diagnosis Date   • Arthritis    • Atrial fibrillation (CMS/HCC)    • Diabetes mellitus (CMS/HCC)    • Diabetic foot ulcer associated with type 2 diabetes mellitus (CMS/HCC)     left great toe   • Hallux malleus of left foot    • Hammer toe    • Hypertension    • MI (myocardial infarction) (CMS/HCC)    • Neuropathy in diabetes (CMS/HCC)    • Onychomycosis          Past Surgical History:   Procedure Laterality Date   • AMPUTATION DIGIT Right 3/5/2017    Procedure: RIGHT AMPUTATION TRANSMETATRASAL , RECESSION GASTROCNEMOUS;  Surgeon: Marco A Thompson DPM;  Location: [facility];  Service:    • CARDIAC DEFIBRILLATOR PLACEMENT  2010, 2016   • CARPAL TUNNEL RELEASE  2015    elbow and wrist left arm   • FOOT IRRIGATION, DEBRIDEMENT AND REPAIR Left 3/7/2018    Procedure: FOOT IRRIGATION, DEBRIDEMENT AND REPAIR;  Surgeon: Marco A Thompson DPM;  Location: [facility] OR;  Service:    • FOOT IRRIGATION, DEBRIDEMENT AND REPAIR Left 3/10/2018    Procedure: FOOT IRRIGATION, DEBRIDEMENT AND REPAIR;  Surgeon: Marco A Thompson DPM;  Location: [facility];  Service: Podiatry   • FOOT SURGERY     • FOOT WOUND CLOSURE Right 3/2/2017    Procedure: INCISION AND DRAINAGE, RIGHT FOOT;  Surgeon: Tung Rosenthal DPM;  Location: [facility];  Service:    • HAMMER TOE REPAIR Left 2/15/2018    Procedure: HAMMERTOE CORRECTION LEFT SECOND, THIRD AND FOURTH TOES AND HALLUX INTERPHALANGEAL JOINT ARTHRODESIS LEFT FOOT (Micro Asnis, 4.0 & 5.0 Asnis)      (c-arm);  Surgeon: Marco A Thompson DPM;  Location: [facility] OR;   Service:    • HARDWARE REMOVAL Left 3/5/2018    Procedure: ANKLE/FOOT HARDWARE REMOVAL;  Surgeon: Marco A Thompson DPM;  [facility];  Service:    • INCISION AND DRAINAGE LEG Left 3/5/2018    Procedure: INCISION AND DRAINAGE LOWER EXTREMITY;  Surgeon: Marco A Thmopson DPM;  Location: [facility];  Service:    • TOE AMPUTATION Right 2016   • TONSILECTOMY, ADENOIDECTOMY, BILATERAL MYRINGOTOMY AND TUBES      age 25         Family History   Problem Relation Age of Onset   • Diabetes Father    • Stroke Father    • No Known Problems Maternal Grandmother    • No Known Problems Maternal Grandfather    • No Known Problems Paternal Grandmother    • No Known Problems Paternal Grandfather    • No Known Problems Son    • No Known Problems Daughter    • No Known Problems Daughter          Social History     Socioeconomic History   • Marital status:      Spouse name: Not on file   • Number of children: Not on file   • Years of education: Not on file   • Highest education level: Not on file   Tobacco Use   • Smoking status: Never Smoker   • Smokeless tobacco: Never Used   Substance and Sexual Activity   • Alcohol use: No   • Drug use: No   • Sexual activity: Defer         Current Outpatient Medications   Medication Sig Dispense Refill   • aspirin 81 MG chewable tablet Chew 81 mg Every Night.     • Bacitracin Zinc (MAGIC BUTT OINTMENT) Apply 1 each topically 2 (Two) Times a Day. 120 g 1   • cefdinir (OMNICEF) 300 MG capsule      • Cholecalciferol (VITAMIN D3) 5000 UNITS capsule capsule Take 5,000 Units by mouth Every Night.     • CLONIDINE HCL 5 MCG/ML ORAL SOLN Take  by mouth.     • Cyanocobalamin (VITAMIN B 12 PO) Take 1,000 mcg by mouth Every Night.     • doxycycline (VIBRAMYCIN) 100 MG capsule Take 1 capsule by mouth 2 (Two) Times a Day. 28 capsule 0   • dronedarone (MULTAQ) 400 MG tablet Take 400 mg by mouth Every Night.     • fluticasone (FLONASE) 50 MCG/ACT nasal spray 2 sprays into each nostril As Needed for  "Rhinitis.     • glimepiride (AMARYL) 2 MG tablet Take 4 mg by mouth 2 (Two) Times a Day.     • glimepiride (AMARYL) 2 MG tablet TAKE 1 TABLET BY MOUTH 2 (TWO) TIMES DAILY WITH FOOD     • HYDROcodone-acetaminophen (NORCO) 7.5-325 MG per tablet Take 1 tablet by mouth Every 6 (Six) Hours As Needed for Moderate Pain . 30 tablet 0   • losartan (COZAAR) 25 MG tablet      • magnesium oxide (MAGOX) 400 (241.3 Mg) MG tablet tablet Take 400 mg by mouth 2 (Two) Times a Day.     • metFORMIN (GLUCOPHAGE) 1000 MG tablet Take 1,000 mg by mouth 2 (Two) Times a Day With Meals. Patient states he only uses when needed     • metFORMIN (GLUCOPHAGE) 1000 MG tablet TAKE 1 TABLET BY MOUTH 2 (TWO) TIMES DAILY WITH MEALS. INDICATIONS: TYPE 2 DIABETES MELLITUS     • metoprolol succinate XL (TOPROL-XL) 25 MG 24 hr tablet      • metoprolol tartrate (LOPRESSOR) 25 MG tablet Take 12.5 mg by mouth Every Night.     • ONE TOUCH ULTRA TEST test strip USE TO TEST BLOOD SUGAR THREE TIMES A DAY  1   • rosuvastatin (CRESTOR) 10 MG tablet      • valsartan (DIOVAN) 160 MG tablet Take 40 mg by mouth Every Night. 1/2 tablet daily      • valsartan-hydrochlorothiazide (DIOVAN-HCT) 160-12.5 MG per tablet Take  by mouth.     • vitamin C (ASCORBIC ACID) 500 MG tablet Take 500 mg by mouth Every Night.     • vitamin E 200 UNIT capsule Take 200 Units by mouth Every Night.       No current facility-administered medications for this visit.            OBJECTIVE    Pulse 85   Ht 190.5 cm (75\")   Wt (!) 142 kg (313 lb)   SpO2 97%   BMI 39.12 kg/m²     Review of Systems   Constitutional: Negative.    HENT: Negative.    Respiratory: Negative.    Skin: Positive for wound.   Psychiatric/Behavioral: Negative.              Physical Exam   Constitutional: He is oriented to person, place, and time. He appears well-developed and well-nourished. No distress.   HENT:   Head: Normocephalic and atraumatic.   Pulmonary/Chest: Effort normal. No respiratory distress. "   Musculoskeletal: Normal range of motion. He exhibits deformity. He exhibits no tenderness.   Neurological: He is alert and oriented to person, place, and time.   Psychiatric: He has a normal mood and affect. Judgment normal.     Left Lower Extremity:     Cardiovascular:    CFT brisk  to all digits  Skin temp is warm to warm from proximal tibia to distal digits  Pedal hair growth diminished.   No erythema or edema noted   Musculoskeletal:  Left hallux slightly abducted  Digits 2 through 5 left are rectus  Dermatological:   Toenails 1 through 5 are thickened, discolored   No subcutaneous nodules or masses noted    Ulcer to plantar lateral left foot measures 0.1 x 0.1 x 0.1cm. HPK border. Base is fibrogranular.   Neurological:   Protective sensation absent        Procedures        ASSESSMENT AND PLAN    Emre was seen today for tcc change, follow-up and foot ulcer.    Diagnoses and all orders for this visit:    Diabetic ulcer of left midfoot associated with type 2 diabetes mellitus, with fat layer exposed (CMS/HCC)    Charcot's joint of left foot      -Foot ulcer continues to improve.  Excisional debridement performed with #15 blade.  Post debridement there is a full-thickness ulcer measuring 0.3 x 0.1 x 0.1 cm.  Dressing applied.  Keep dressing clean, dry and intact.   -Patient requests to discontinue the use of the total contact cast for 1 week due to personal reasons.  He understands the risks.  -  Dispensed offloaded surgical shoe.  Weightbearing as tolerated in surgical shoe only.  - All his questions were answered  - Return to clinic 1 week          This document has been electronically signed by Marco A Thompson DPM on September 18, 2019 8:48 AM

## 2019-09-25 ENCOUNTER — OFFICE VISIT (OUTPATIENT)
Dept: PODIATRY | Facility: CLINIC | Age: 59
End: 2019-09-25

## 2019-09-25 VITALS — BODY MASS INDEX: 38.92 KG/M2 | HEART RATE: 93 BPM | OXYGEN SATURATION: 99 % | WEIGHT: 313 LBS | HEIGHT: 75 IN

## 2019-09-25 DIAGNOSIS — E11.621 DIABETIC ULCER OF LEFT MIDFOOT ASSOCIATED WITH TYPE 2 DIABETES MELLITUS, WITH FAT LAYER EXPOSED (HCC): Primary | ICD-10-CM

## 2019-09-25 DIAGNOSIS — L97.422 DIABETIC ULCER OF LEFT MIDFOOT ASSOCIATED WITH TYPE 2 DIABETES MELLITUS, WITH FAT LAYER EXPOSED (HCC): Primary | ICD-10-CM

## 2019-09-25 DIAGNOSIS — M14.672 CHARCOT'S JOINT OF LEFT FOOT: ICD-10-CM

## 2019-09-25 PROCEDURE — 29445 APPL RIGID TOT CNTC LEG CAST: CPT | Performed by: PODIATRIST

## 2019-09-25 NOTE — PROGRESS NOTES
Emre Lisa  1960  59 y.o. male   [place] - 6/13/2019  BS - 104 per patient    Patient presents today for a recheck of his left foot ulcer    09/25/19     Chief Complaint   Patient presents with   • Left Foot - Follow-up, Skin Ulcer       History of Present Illness    Patient presents to clinic for follow-up of his left foot ulcer.   He is ambulating in offloaded surgical shoe.       Past Medical History:   Diagnosis Date   • Arthritis    • Atrial fibrillation (CMS/HCC)    • Diabetes mellitus (CMS/HCC)    • Diabetic foot ulcer associated with type 2 diabetes mellitus (CMS/HCC)     left great toe   • Hallux malleus of left foot    • Hammer toe    • Hypertension    • MI (myocardial infarction) (CMS/HCC)    • Neuropathy in diabetes (CMS/HCC)    • Onychomycosis          Past Surgical History:   Procedure Laterality Date   • AMPUTATION DIGIT Right 3/5/2017    Procedure: RIGHT AMPUTATION TRANSMETATRASAL , RECESSION GASTROCNEMOUS;  Surgeon: Marco A Thompson DPM;  Location: [facility];  Service:    • CARDIAC DEFIBRILLATOR PLACEMENT  2010, 2016   • CARPAL TUNNEL RELEASE  2015    elbow and wrist left arm   • FOOT IRRIGATION, DEBRIDEMENT AND REPAIR Left 3/7/2018    Procedure: FOOT IRRIGATION, DEBRIDEMENT AND REPAIR;  Surgeon: Marco A Thompson DPM;  Location: [facility] OR;  Service:    • FOOT IRRIGATION, DEBRIDEMENT AND REPAIR Left 3/10/2018    Procedure: FOOT IRRIGATION, DEBRIDEMENT AND REPAIR;  Surgeon: Marco A Thompson DPM;  Location: [facility];  Service: Podiatry   • FOOT SURGERY     • FOOT WOUND CLOSURE Right 3/2/2017    Procedure: INCISION AND DRAINAGE, RIGHT FOOT;  Surgeon: Tung Rosenthal DPM;  Location: [facility] OR;  Service:    • HAMMER TOE REPAIR Left 2/15/2018    Procedure: HAMMERTOE CORRECTION LEFT SECOND, THIRD AND FOURTH TOES AND HALLUX INTERPHALANGEAL JOINT ARTHRODESIS LEFT FOOT (Micro Asnis, 4.0 & 5.0 Asnis)      (c-arm);  Surgeon: Marco A Thompson DPM;  Location: [facility] OR;  Service:    • HARDWARE REMOVAL  Left 3/5/2018    Procedure: ANKLE/FOOT HARDWARE REMOVAL;  Surgeon: Marco A Thompson DPM;  Location: [facility] OR;  Service:    • INCISION AND DRAINAGE LEG Left 3/5/2018    Procedure: INCISION AND DRAINAGE LOWER EXTREMITY;  Surgeon: Marco A Thompson DPM;  Location: [facility];  Service:    • TOE AMPUTATION Right 2016   • TONSILECTOMY, ADENOIDECTOMY, BILATERAL MYRINGOTOMY AND TUBES      age 25         Family History   Problem Relation Age of Onset   • Diabetes Father    • Stroke Father    • No Known Problems Maternal Grandmother    • No Known Problems Maternal Grandfather    • No Known Problems Paternal Grandmother    • No Known Problems Paternal Grandfather    • No Known Problems Son    • No Known Problems Daughter    • No Known Problems Daughter          Social History     Socioeconomic History   • Marital status:      Spouse name: Not on file   • Number of children: Not on file   • Years of education: Not on file   • Highest education level: Not on file   Tobacco Use   • Smoking status: Never Smoker   • Smokeless tobacco: Never Used   Substance and Sexual Activity   • Alcohol use: No   • Drug use: No   • Sexual activity: Defer         Current Outpatient Medications   Medication Sig Dispense Refill   • aspirin 81 MG chewable tablet Chew 81 mg Every Night.     • Bacitracin Zinc (MAGIC BUTT OINTMENT) Apply 1 each topically 2 (Two) Times a Day. 120 g 1   • cefdinir (OMNICEF) 300 MG capsule      • Cholecalciferol (VITAMIN D3) 5000 UNITS capsule capsule Take 5,000 Units by mouth Every Night.     • CLONIDINE HCL 5 MCG/ML ORAL SOLN Take  by mouth.     • Cyanocobalamin (VITAMIN B 12 PO) Take 1,000 mcg by mouth Every Night.     • doxycycline (VIBRAMYCIN) 100 MG capsule Take 1 capsule by mouth 2 (Two) Times a Day. 28 capsule 0   • dronedarone (MULTAQ) 400 MG tablet Take 400 mg by mouth Every Night.     • fluticasone (FLONASE) 50 MCG/ACT nasal spray 2 sprays into each nostril As Needed for Rhinitis.     • glimepiride (AMARYL)  "2 MG tablet Take 4 mg by mouth 2 (Two) Times a Day.     • glimepiride (AMARYL) 2 MG tablet TAKE 1 TABLET BY MOUTH 2 (TWO) TIMES DAILY WITH FOOD     • HYDROcodone-acetaminophen (NORCO) 7.5-325 MG per tablet Take 1 tablet by mouth Every 6 (Six) Hours As Needed for Moderate Pain . 30 tablet 0   • losartan (COZAAR) 25 MG tablet      • magnesium oxide (MAGOX) 400 (241.3 Mg) MG tablet tablet Take 400 mg by mouth 2 (Two) Times a Day.     • metFORMIN (GLUCOPHAGE) 1000 MG tablet Take 1,000 mg by mouth 2 (Two) Times a Day With Meals. Patient states he only uses when needed     • metFORMIN (GLUCOPHAGE) 1000 MG tablet TAKE 1 TABLET BY MOUTH 2 (TWO) TIMES DAILY WITH MEALS. INDICATIONS: TYPE 2 DIABETES MELLITUS     • metoprolol succinate XL (TOPROL-XL) 25 MG 24 hr tablet      • metoprolol tartrate (LOPRESSOR) 25 MG tablet Take 12.5 mg by mouth Every Night.     • ONE TOUCH ULTRA TEST test strip USE TO TEST BLOOD SUGAR THREE TIMES A DAY  1   • rosuvastatin (CRESTOR) 10 MG tablet      • valsartan (DIOVAN) 160 MG tablet Take 40 mg by mouth Every Night. 1/2 tablet daily      • valsartan-hydrochlorothiazide (DIOVAN-HCT) 160-12.5 MG per tablet Take  by mouth.     • vitamin C (ASCORBIC ACID) 500 MG tablet Take 500 mg by mouth Every Night.     • vitamin E 200 UNIT capsule Take 200 Units by mouth Every Night.       No current facility-administered medications for this visit.            OBJECTIVE    Pulse 93   Ht 190.5 cm (75\")   Wt (!) 142 kg (313 lb)   SpO2 99%   BMI 39.12 kg/m²     Review of Systems   Constitutional: Negative.    HENT: Negative.    Eyes: Negative.    Respiratory: Negative.    Cardiovascular: Negative.    Gastrointestinal: Negative.    Genitourinary: Negative.    Musculoskeletal: Negative.    Skin: Positive for wound.   Neurological: Negative.    Psychiatric/Behavioral: Negative.              Physical Exam   Constitutional: He is oriented to person, place, and time. He appears well-developed and well-nourished. No " distress.   HENT:   Head: Normocephalic and atraumatic.   Eyes: EOM are normal. Pupils are equal, round, and reactive to light.   Pulmonary/Chest: Effort normal. No respiratory distress.   Musculoskeletal: Normal range of motion. He exhibits deformity. He exhibits no tenderness.   Neurological: He is alert and oriented to person, place, and time.   Psychiatric: He has a normal mood and affect. Judgment normal.   Vitals reviewed.    Left Lower Extremity:     Cardiovascular:    CFT brisk  to all digits  Skin temp is warm to warm from proximal tibia to distal digits  Pedal hair growth diminished.   No erythema or edema noted   Musculoskeletal:  Left hallux slightly abducted  Digits 2 through 5 left are rectus  Dermatological:   Toenails 1 through 5 are thickened, discolored   No subcutaneous nodules or masses noted    Ulcer to plantar lateral left foot measures 0.4 x 0.2 x 0.1cm.    Neurological:   Protective sensation absent        Procedures        ASSESSMENT AND PLAN    Emre was seen today for follow-up and skin ulcer.    Diagnoses and all orders for this visit:    Diabetic ulcer of left midfoot associated with type 2 diabetes mellitus, with fat layer exposed (CMS/HCC)    Charcot's joint of left foot      -Ulcer slightly larger.  Reapplied total contact cast.  - All his questions were answered  - Return to clinic 1 week          This document has been electronically signed by Marco A Thompson DPM on September 25, 2019 9:02 AM

## 2019-10-02 ENCOUNTER — OFFICE VISIT (OUTPATIENT)
Dept: PODIATRY | Facility: CLINIC | Age: 59
End: 2019-10-02

## 2019-10-02 VITALS — OXYGEN SATURATION: 98 % | HEIGHT: 75 IN | BODY MASS INDEX: 38.92 KG/M2 | HEART RATE: 96 BPM | WEIGHT: 313 LBS

## 2019-10-02 DIAGNOSIS — L97.422 DIABETIC ULCER OF LEFT MIDFOOT ASSOCIATED WITH TYPE 2 DIABETES MELLITUS, WITH FAT LAYER EXPOSED (HCC): Primary | ICD-10-CM

## 2019-10-02 DIAGNOSIS — E11.621 DIABETIC ULCER OF LEFT MIDFOOT ASSOCIATED WITH TYPE 2 DIABETES MELLITUS, WITH FAT LAYER EXPOSED (HCC): Primary | ICD-10-CM

## 2019-10-02 DIAGNOSIS — M14.672 CHARCOT'S JOINT OF LEFT FOOT: ICD-10-CM

## 2019-10-02 PROCEDURE — 29445 APPL RIGID TOT CNTC LEG CAST: CPT | Performed by: PODIATRIST

## 2019-10-02 NOTE — PROGRESS NOTES
Emre Lisa  1960  59 y.o. male   [place] - 6/13/2019  BS - 115 per patient    Patient presents today for a recheck of his left foot ulcer and TCC change.    10/02/19     Chief Complaint   Patient presents with   • Left Foot - Follow-up, TCC change, Foot Ulcer       History of Present Illness    Patient presents to clinic for follow-up of his left foot ulcer.   He is ambulating in a cam boot.      Past Medical History:   Diagnosis Date   • Arthritis    • Atrial fibrillation (CMS/HCC)    • Diabetes mellitus (CMS/HCC)    • Diabetic foot ulcer associated with type 2 diabetes mellitus (CMS/HCC)     left great toe   • Hallux malleus of left foot    • Hammer toe    • Hypertension    • MI (myocardial infarction) (CMS/HCC)    • Neuropathy in diabetes (CMS/HCC)    • Onychomycosis          Past Surgical History:   Procedure Laterality Date   • AMPUTATION DIGIT Right 3/5/2017    Procedure: RIGHT AMPUTATION TRANSMETATRASAL , RECESSION GASTROCNEMOUS;  Surgeon: Marco A Thompson DPM;  Location: [facility];  Service:    • CARDIAC DEFIBRILLATOR PLACEMENT  2010, 2016   • CARPAL TUNNEL RELEASE  2015    elbow and wrist left arm   • FOOT IRRIGATION, DEBRIDEMENT AND REPAIR Left 3/7/2018    Procedure: FOOT IRRIGATION, DEBRIDEMENT AND REPAIR;  Surgeon: Marco A Thompson DPM;  Location: [facility];  Service:    • FOOT IRRIGATION, DEBRIDEMENT AND REPAIR Left 3/10/2018    Procedure: FOOT IRRIGATION, DEBRIDEMENT AND REPAIR;  Surgeon: Marco A Thompson DPM;  Location: [facility];  Service: Podiatry   • FOOT SURGERY     • FOOT WOUND CLOSURE Right 3/2/2017    Procedure: INCISION AND DRAINAGE, RIGHT FOOT;  Surgeon: Tung Rosenthal DPM;  Location: [facility] OR;  Service:    • HAMMER TOE REPAIR Left 2/15/2018    Procedure: HAMMERTOE CORRECTION LEFT SECOND, THIRD AND FOURTH TOES AND HALLUX INTERPHALANGEAL JOINT ARTHRODESIS LEFT FOOT (Micro Asnis, 4.0 & 5.0 Asnis)      (c-arm);  Surgeon: Marco A Thompson DPM;  Location: [facility];  Service:    •  HARDWARE REMOVAL Left 3/5/2018    Procedure: ANKLE/FOOT HARDWARE REMOVAL;  Surgeon: Marco A Thompson DPM;  Location: [facility] OR;  Service:    • INCISION AND DRAINAGE LEG Left 3/5/2018    Procedure: INCISION AND DRAINAGE LOWER EXTREMITY;  Surgeon: Marco A Thompson DPM;  Location: [facility];  Service:    • TOE AMPUTATION Right 2016   • TONSILECTOMY, ADENOIDECTOMY, BILATERAL MYRINGOTOMY AND TUBES      age 25         Family History   Problem Relation Age of Onset   • Diabetes Father    • Stroke Father    • No Known Problems Maternal Grandmother    • No Known Problems Maternal Grandfather    • No Known Problems Paternal Grandmother    • No Known Problems Paternal Grandfather    • No Known Problems Son    • No Known Problems Daughter    • No Known Problems Daughter          Social History     Socioeconomic History   • Marital status:      Spouse name: Not on file   • Number of children: Not on file   • Years of education: Not on file   • Highest education level: Not on file   Tobacco Use   • Smoking status: Never Smoker   • Smokeless tobacco: Never Used   Substance and Sexual Activity   • Alcohol use: No   • Drug use: No   • Sexual activity: Defer         Current Outpatient Medications   Medication Sig Dispense Refill   • aspirin 81 MG chewable tablet Chew 81 mg Every Night.     • Bacitracin Zinc (MAGIC BUTT OINTMENT) Apply 1 each topically 2 (Two) Times a Day. 120 g 1   • cefdinir (OMNICEF) 300 MG capsule      • Cholecalciferol (VITAMIN D3) 5000 UNITS capsule capsule Take 5,000 Units by mouth Every Night.     • CLONIDINE HCL 5 MCG/ML ORAL SOLN Take  by mouth.     • Cyanocobalamin (VITAMIN B 12 PO) Take 1,000 mcg by mouth Every Night.     • doxycycline (VIBRAMYCIN) 100 MG capsule Take 1 capsule by mouth 2 (Two) Times a Day. 28 capsule 0   • dronedarone (MULTAQ) 400 MG tablet Take 400 mg by mouth Every Night.     • fluticasone (FLONASE) 50 MCG/ACT nasal spray 2 sprays into each nostril As Needed for Rhinitis.     •  "glimepiride (AMARYL) 2 MG tablet Take 4 mg by mouth 2 (Two) Times a Day.     • glimepiride (AMARYL) 2 MG tablet TAKE 1 TABLET BY MOUTH 2 (TWO) TIMES DAILY WITH FOOD     • HYDROcodone-acetaminophen (NORCO) 7.5-325 MG per tablet Take 1 tablet by mouth Every 6 (Six) Hours As Needed for Moderate Pain . 30 tablet 0   • losartan (COZAAR) 25 MG tablet      • magnesium oxide (MAGOX) 400 (241.3 Mg) MG tablet tablet Take 400 mg by mouth 2 (Two) Times a Day.     • metFORMIN (GLUCOPHAGE) 1000 MG tablet Take 1,000 mg by mouth 2 (Two) Times a Day With Meals. Patient states he only uses when needed     • metFORMIN (GLUCOPHAGE) 1000 MG tablet TAKE 1 TABLET BY MOUTH 2 (TWO) TIMES DAILY WITH MEALS. INDICATIONS: TYPE 2 DIABETES MELLITUS     • metoprolol succinate XL (TOPROL-XL) 25 MG 24 hr tablet      • metoprolol tartrate (LOPRESSOR) 25 MG tablet Take 12.5 mg by mouth Every Night.     • ONE TOUCH ULTRA TEST test strip USE TO TEST BLOOD SUGAR THREE TIMES A DAY  1   • rosuvastatin (CRESTOR) 10 MG tablet      • valsartan (DIOVAN) 160 MG tablet Take 40 mg by mouth Every Night. 1/2 tablet daily      • valsartan-hydrochlorothiazide (DIOVAN-HCT) 160-12.5 MG per tablet Take  by mouth.     • vitamin C (ASCORBIC ACID) 500 MG tablet Take 500 mg by mouth Every Night.     • vitamin E 200 UNIT capsule Take 200 Units by mouth Every Night.       No current facility-administered medications for this visit.            OBJECTIVE    Pulse 96   Ht 190.5 cm (75\")   Wt (!) 142 kg (313 lb)   SpO2 98%   BMI 39.12 kg/m²     Review of Systems   Constitutional: Negative.    HENT: Negative.    Eyes: Negative.    Respiratory: Negative.    Cardiovascular: Negative.    Gastrointestinal: Negative.    Genitourinary: Negative.    Musculoskeletal: Negative.    Skin: Positive for wound.   Neurological: Negative.    Psychiatric/Behavioral: Negative.              Physical Exam   Constitutional: He is oriented to person, place, and time. He appears well-developed and " well-nourished. No distress.   HENT:   Head: Normocephalic and atraumatic.   Eyes: EOM are normal. Pupils are equal, round, and reactive to light.   Pulmonary/Chest: Effort normal. No respiratory distress.   Musculoskeletal: Normal range of motion. He exhibits deformity. He exhibits no tenderness.   Neurological: He is alert and oriented to person, place, and time.   Psychiatric: He has a normal mood and affect. Judgment normal.   Vitals reviewed.    Left Lower Extremity:     Cardiovascular:    CFT brisk  to all digits  Skin temp is warm to warm from proximal tibia to distal digits  Pedal hair growth diminished.   No erythema or edema noted   Musculoskeletal:  Left hallux slightly abducted  Digits 2 through 5 left are rectus  Dermatological:   Toenails 1 through 5 are thickened, discolored   No subcutaneous nodules or masses noted    Ulcer to plantar lateral left foot measures 1.0 x 0.5 x 0.2 cm.  Neurological:   Protective sensation absent        Procedures        ASSESSMENT AND PLAN    Emre was seen today for follow-up, tcc change and foot ulcer.    Diagnoses and all orders for this visit:    Diabetic ulcer of left midfoot associated with type 2 diabetes mellitus, with fat layer exposed (CMS/HCC)    Charcot's joint of left foot      -  Reapplied total contact cast.  Brief discussion held patient regarding treatment options going forward.  Recommended to patient surgical intervention if ulcer is not smaller on his next visit.  - All his questions were answered  - Return to clinic 1 week          This document has been electronically signed by Marco A Thompson DPM on October 2, 2019 9:03 AM

## 2019-10-09 ENCOUNTER — OFFICE VISIT (OUTPATIENT)
Dept: PODIATRY | Facility: CLINIC | Age: 59
End: 2019-10-09

## 2019-10-09 VITALS — HEART RATE: 87 BPM | WEIGHT: 313 LBS | OXYGEN SATURATION: 98 % | HEIGHT: 75 IN | BODY MASS INDEX: 38.92 KG/M2

## 2019-10-09 DIAGNOSIS — M14.672 CHARCOT'S JOINT OF LEFT FOOT: ICD-10-CM

## 2019-10-09 DIAGNOSIS — L97.422 DIABETIC ULCER OF LEFT MIDFOOT ASSOCIATED WITH TYPE 2 DIABETES MELLITUS, WITH FAT LAYER EXPOSED (HCC): Primary | ICD-10-CM

## 2019-10-09 DIAGNOSIS — E11.621 DIABETIC ULCER OF LEFT MIDFOOT ASSOCIATED WITH TYPE 2 DIABETES MELLITUS, WITH FAT LAYER EXPOSED (HCC): Primary | ICD-10-CM

## 2019-10-09 PROCEDURE — 29445 APPL RIGID TOT CNTC LEG CAST: CPT | Performed by: PODIATRIST

## 2019-10-09 NOTE — PROGRESS NOTES
Emre Lisa  1960  59 y.o. male   [place] - 6/13/2019  BS - 86 per patient    Patient presents today for a recheck of his left foot ulcer and TCC change.    10/09/19     Chief Complaint   Patient presents with   • Left Foot - Follow-up, Foot Ulcer, TCC change       History of Present Illness    Patient presents to clinic for follow-up of his left foot ulcer.   He is ambulating in a TCC.      Past Medical History:   Diagnosis Date   • Arthritis    • Atrial fibrillation (CMS/HCC)    • Diabetes mellitus (CMS/HCC)    • Diabetic foot ulcer associated with type 2 diabetes mellitus (CMS/HCC)     left great toe   • Hallux malleus of left foot    • Hammer toe    • Hypertension    • MI (myocardial infarction) (CMS/HCC)    • Neuropathy in diabetes (CMS/HCC)    • Onychomycosis          Past Surgical History:   Procedure Laterality Date   • AMPUTATION DIGIT Right 3/5/2017    Procedure: RIGHT AMPUTATION TRANSMETATRASAL , RECESSION GASTROCNEMOUS;  Surgeon: Marco A Thompson DPM;  Location: [facility];  Service:    • CARDIAC DEFIBRILLATOR PLACEMENT  2010, 2016   • CARPAL TUNNEL RELEASE  2015    elbow and wrist left arm   • FOOT IRRIGATION, DEBRIDEMENT AND REPAIR Left 3/7/2018    Procedure: FOOT IRRIGATION, DEBRIDEMENT AND REPAIR;  Surgeon: Marco A Thompson DPM;  Location: [facility];  Service:    • FOOT IRRIGATION, DEBRIDEMENT AND REPAIR Left 3/10/2018    Procedure: FOOT IRRIGATION, DEBRIDEMENT AND REPAIR;  Surgeon: Marco A Thompson DPM;  Location: [facility];  Service: Podiatry   • FOOT SURGERY     • FOOT WOUND CLOSURE Right 3/2/2017    Procedure: INCISION AND DRAINAGE, RIGHT FOOT;  Surgeon: Tung Rosenthal DPM;  Location: [facility] OR;  Service:    • HAMMER TOE REPAIR Left 2/15/2018    Procedure: HAMMERTOE CORRECTION LEFT SECOND, THIRD AND FOURTH TOES AND HALLUX INTERPHALANGEAL JOINT ARTHRODESIS LEFT FOOT (Micro Asnis, 4.0 & 5.0 Asnis)      (c-arm);  Surgeon: Marco A Thompson DPM;  Location: [facility];  Service:    • HARDWARE  REMOVAL Left 3/5/2018    Procedure: ANKLE/FOOT HARDWARE REMOVAL;  Surgeon: Marco A Thompson DPM;  Location: [facility] OR;  Service:    • INCISION AND DRAINAGE LEG Left 3/5/2018    Procedure: INCISION AND DRAINAGE LOWER EXTREMITY;  Surgeon: Marco A Thompson DPM;  Location: [facility];  Service:    • TOE AMPUTATION Right 2016   • TONSILECTOMY, ADENOIDECTOMY, BILATERAL MYRINGOTOMY AND TUBES      age 25         Family History   Problem Relation Age of Onset   • Diabetes Father    • Stroke Father    • No Known Problems Maternal Grandmother    • No Known Problems Maternal Grandfather    • No Known Problems Paternal Grandmother    • No Known Problems Paternal Grandfather    • No Known Problems Son    • No Known Problems Daughter    • No Known Problems Daughter          Social History     Socioeconomic History   • Marital status:      Spouse name: Not on file   • Number of children: Not on file   • Years of education: Not on file   • Highest education level: Not on file   Tobacco Use   • Smoking status: Never Smoker   • Smokeless tobacco: Never Used   Substance and Sexual Activity   • Alcohol use: No   • Drug use: No   • Sexual activity: Defer         Current Outpatient Medications   Medication Sig Dispense Refill   • aspirin 81 MG chewable tablet Chew 81 mg Every Night.     • Bacitracin Zinc (MAGIC BUTT OINTMENT) Apply 1 each topically 2 (Two) Times a Day. 120 g 1   • cefdinir (OMNICEF) 300 MG capsule      • Cholecalciferol (VITAMIN D3) 5000 UNITS capsule capsule Take 5,000 Units by mouth Every Night.     • CLONIDINE HCL 5 MCG/ML ORAL SOLN Take  by mouth.     • Cyanocobalamin (VITAMIN B 12 PO) Take 1,000 mcg by mouth Every Night.     • doxycycline (VIBRAMYCIN) 100 MG capsule Take 1 capsule by mouth 2 (Two) Times a Day. 28 capsule 0   • dronedarone (MULTAQ) 400 MG tablet Take 400 mg by mouth Every Night.     • fluticasone (FLONASE) 50 MCG/ACT nasal spray 2 sprays into each nostril As Needed for Rhinitis.     • glimepiride  "(AMARYL) 2 MG tablet Take 4 mg by mouth 2 (Two) Times a Day.     • glimepiride (AMARYL) 2 MG tablet TAKE 1 TABLET BY MOUTH 2 (TWO) TIMES DAILY WITH FOOD     • HYDROcodone-acetaminophen (NORCO) 7.5-325 MG per tablet Take 1 tablet by mouth Every 6 (Six) Hours As Needed for Moderate Pain . 30 tablet 0   • losartan (COZAAR) 25 MG tablet      • magnesium oxide (MAGOX) 400 (241.3 Mg) MG tablet tablet Take 400 mg by mouth 2 (Two) Times a Day.     • metFORMIN (GLUCOPHAGE) 1000 MG tablet Take 1,000 mg by mouth 2 (Two) Times a Day With Meals. Patient states he only uses when needed     • metFORMIN (GLUCOPHAGE) 1000 MG tablet TAKE 1 TABLET BY MOUTH 2 (TWO) TIMES DAILY WITH MEALS. INDICATIONS: TYPE 2 DIABETES MELLITUS     • metoprolol succinate XL (TOPROL-XL) 25 MG 24 hr tablet      • metoprolol tartrate (LOPRESSOR) 25 MG tablet Take 12.5 mg by mouth Every Night.     • ONE TOUCH ULTRA TEST test strip USE TO TEST BLOOD SUGAR THREE TIMES A DAY  1   • rosuvastatin (CRESTOR) 10 MG tablet      • valsartan (DIOVAN) 160 MG tablet Take 40 mg by mouth Every Night. 1/2 tablet daily      • valsartan-hydrochlorothiazide (DIOVAN-HCT) 160-12.5 MG per tablet Take  by mouth.     • vitamin C (ASCORBIC ACID) 500 MG tablet Take 500 mg by mouth Every Night.     • vitamin E 200 UNIT capsule Take 200 Units by mouth Every Night.       No current facility-administered medications for this visit.            OBJECTIVE    Pulse 87   Ht 190.5 cm (75\")   Wt (!) 142 kg (313 lb)   SpO2 98%   BMI 39.12 kg/m²     Review of Systems   Constitutional: Negative.    HENT: Negative.    Eyes: Negative.    Respiratory: Negative.    Cardiovascular: Negative.    Gastrointestinal: Negative.    Endocrine: Negative.    Genitourinary: Negative.    Musculoskeletal: Negative.    Skin: Positive for wound.   Neurological: Negative.    Psychiatric/Behavioral: Negative.              Physical Exam   Constitutional: He is oriented to person, place, and time. He appears " well-developed and well-nourished. No distress.   HENT:   Head: Normocephalic and atraumatic.   Eyes: EOM are normal. Pupils are equal, round, and reactive to light.   Pulmonary/Chest: Effort normal. No respiratory distress.   Musculoskeletal: Normal range of motion. He exhibits deformity. He exhibits no tenderness.   Neurological: He is alert and oriented to person, place, and time.   Psychiatric: He has a normal mood and affect. Judgment normal.   Vitals reviewed.    Left Lower Extremity:     Cardiovascular:    CFT brisk  to all digits  Skin temp is warm to warm from proximal tibia to distal digits  Pedal hair growth diminished.   No erythema or edema noted   Musculoskeletal:  Left hallux slightly abducted  Digits 2 through 5 left are rectus  Dermatological:   Toenails 1 through 5 are thickened, discolored   No subcutaneous nodules or masses noted    Ulcer to plantar lateral left foot   Neurological:   Protective sensation absent        Procedures        ASSESSMENT AND PLAN    Emre was seen today for follow-up, foot ulcer and tcc change.    Diagnoses and all orders for this visit:    Diabetic ulcer of left midfoot associated with type 2 diabetes mellitus, with fat layer exposed (CMS/HCC)    Charcot's joint of left foot      -  Reapplied total contact cast.    -Tentative plan for plantar planing on October 17.  - All his questions were answered  - Return to clinic 1 week          This document has been electronically signed by Marco A Thompson DPM on October 9, 2019 9:03 AM

## 2019-10-14 ENCOUNTER — OFFICE VISIT (OUTPATIENT)
Dept: PODIATRY | Facility: CLINIC | Age: 59
End: 2019-10-14

## 2019-10-14 VITALS — HEIGHT: 75 IN | BODY MASS INDEX: 38.92 KG/M2 | WEIGHT: 313 LBS | HEART RATE: 88 BPM | OXYGEN SATURATION: 97 %

## 2019-10-14 DIAGNOSIS — L97.422 DIABETIC ULCER OF LEFT MIDFOOT ASSOCIATED WITH TYPE 2 DIABETES MELLITUS, WITH FAT LAYER EXPOSED (HCC): Primary | ICD-10-CM

## 2019-10-14 DIAGNOSIS — E11.621 DIABETIC ULCER OF LEFT MIDFOOT ASSOCIATED WITH TYPE 2 DIABETES MELLITUS, WITH FAT LAYER EXPOSED (HCC): Primary | ICD-10-CM

## 2019-10-14 DIAGNOSIS — M14.672 CHARCOT'S JOINT OF LEFT FOOT: ICD-10-CM

## 2019-10-14 DIAGNOSIS — E11.42 TYPE 2 DIABETES MELLITUS WITH PERIPHERAL NEUROPATHY (HCC): ICD-10-CM

## 2019-10-14 PROCEDURE — 29445 APPL RIGID TOT CNTC LEG CAST: CPT | Performed by: PODIATRIST

## 2019-10-14 NOTE — PROGRESS NOTES
Emre Lisa  1960  59 y.o. male   [place] - 6/13/2019  BS - 96 per patient    Patient presents today for a recheck of his left foot ulcer and TCC change.    10/15/19     Chief Complaint   Patient presents with   • Left Foot - Follow-up, Foot Ulcer, TCC change       History of Present Illness    Patient presents to clinic for follow-up of his left foot ulcer.   He is ambulating in a TCC.      Past Medical History:   Diagnosis Date   • Arthritis    • Atrial fibrillation (CMS/HCC)    • Diabetes mellitus (CMS/HCC)    • Diabetic foot ulcer associated with type 2 diabetes mellitus (CMS/HCC)     left great toe   • Hallux malleus of left foot    • Hammer toe    • Hypertension    • MI (myocardial infarction) (CMS/HCC)    • Neuropathy in diabetes (CMS/HCC)    • Onychomycosis          Past Surgical History:   Procedure Laterality Date   • AMPUTATION DIGIT Right 3/5/2017    Procedure: RIGHT AMPUTATION TRANSMETATRASAL , RECESSION GASTROCNEMOUS;  Surgeon: Marco A Thompson DPM;  Location: [facility];  Service:    • CARDIAC DEFIBRILLATOR PLACEMENT  2010, 2016   • CARPAL TUNNEL RELEASE  2015    elbow and wrist left arm   • FOOT IRRIGATION, DEBRIDEMENT AND REPAIR Left 3/7/2018    Procedure: FOOT IRRIGATION, DEBRIDEMENT AND REPAIR;  Surgeon: Marco A Thompson DPM;  Location: [facility];  Service:    • FOOT IRRIGATION, DEBRIDEMENT AND REPAIR Left 3/10/2018    Procedure: FOOT IRRIGATION, DEBRIDEMENT AND REPAIR;  Surgeon: Marco A Thompson DPM;  Location: [facility];  Service: Podiatry   • FOOT SURGERY     • FOOT WOUND CLOSURE Right 3/2/2017    Procedure: INCISION AND DRAINAGE, RIGHT FOOT;  Surgeon: Tung Rosenthal DPM;  Location: [facility] OR;  Service:    • HAMMER TOE REPAIR Left 2/15/2018    Procedure: HAMMERTOE CORRECTION LEFT SECOND, THIRD AND FOURTH TOES AND HALLUX INTERPHALANGEAL JOINT ARTHRODESIS LEFT FOOT (Micro Asnis, 4.0 & 5.0 Asnis)      (c-arm);  Surgeon: Marco A Thompson DPM;  Location: [facility];  Service:    • HARDWARE  REMOVAL Left 3/5/2018    Procedure: ANKLE/FOOT HARDWARE REMOVAL;  Surgeon: Marco A Thompson DPM;  Location: [facility] OR;  Service:    • INCISION AND DRAINAGE LEG Left 3/5/2018    Procedure: INCISION AND DRAINAGE LOWER EXTREMITY;  Surgeon: Marco A Thompson DPM;  Location: [facility];  Service:    • TOE AMPUTATION Right 2016   • TONSILECTOMY, ADENOIDECTOMY, BILATERAL MYRINGOTOMY AND TUBES      age 25         Family History   Problem Relation Age of Onset   • Diabetes Father    • Stroke Father    • No Known Problems Maternal Grandmother    • No Known Problems Maternal Grandfather    • No Known Problems Paternal Grandmother    • No Known Problems Paternal Grandfather    • No Known Problems Son    • No Known Problems Daughter    • No Known Problems Daughter          Social History     Socioeconomic History   • Marital status:      Spouse name: Not on file   • Number of children: Not on file   • Years of education: Not on file   • Highest education level: Not on file   Tobacco Use   • Smoking status: Never Smoker   • Smokeless tobacco: Never Used   Substance and Sexual Activity   • Alcohol use: No   • Drug use: No   • Sexual activity: Defer         Current Outpatient Medications   Medication Sig Dispense Refill   • aspirin 81 MG chewable tablet Chew 81 mg Every Night.     • Bacitracin Zinc (MAGIC BUTT OINTMENT) Apply 1 each topically 2 (Two) Times a Day. 120 g 1   • cefdinir (OMNICEF) 300 MG capsule      • Cholecalciferol (VITAMIN D3) 5000 UNITS capsule capsule Take 5,000 Units by mouth Every Night.     • CLONIDINE HCL 5 MCG/ML ORAL SOLN Take  by mouth.     • Cyanocobalamin (VITAMIN B 12 PO) Take 1,000 mcg by mouth Every Night.     • doxycycline (VIBRAMYCIN) 100 MG capsule Take 1 capsule by mouth 2 (Two) Times a Day. 28 capsule 0   • dronedarone (MULTAQ) 400 MG tablet Take 400 mg by mouth Every Night.     • fluticasone (FLONASE) 50 MCG/ACT nasal spray 2 sprays into each nostril As Needed for Rhinitis.     • glimepiride  "(AMARYL) 2 MG tablet Take 4 mg by mouth 2 (Two) Times a Day.     • glimepiride (AMARYL) 2 MG tablet TAKE 1 TABLET BY MOUTH 2 (TWO) TIMES DAILY WITH FOOD     • HYDROcodone-acetaminophen (NORCO) 7.5-325 MG per tablet Take 1 tablet by mouth Every 6 (Six) Hours As Needed for Moderate Pain . 30 tablet 0   • losartan (COZAAR) 25 MG tablet      • magnesium oxide (MAGOX) 400 (241.3 Mg) MG tablet tablet Take 400 mg by mouth 2 (Two) Times a Day.     • metFORMIN (GLUCOPHAGE) 1000 MG tablet Take 1,000 mg by mouth 2 (Two) Times a Day With Meals. Patient states he only uses when needed     • metFORMIN (GLUCOPHAGE) 1000 MG tablet TAKE 1 TABLET BY MOUTH 2 (TWO) TIMES DAILY WITH MEALS. INDICATIONS: TYPE 2 DIABETES MELLITUS     • metoprolol succinate XL (TOPROL-XL) 25 MG 24 hr tablet      • metoprolol tartrate (LOPRESSOR) 25 MG tablet Take 12.5 mg by mouth Every Night.     • ONE TOUCH ULTRA TEST test strip USE TO TEST BLOOD SUGAR THREE TIMES A DAY  1   • rosuvastatin (CRESTOR) 10 MG tablet      • valsartan (DIOVAN) 160 MG tablet Take 40 mg by mouth Every Night. 1/2 tablet daily      • valsartan-hydrochlorothiazide (DIOVAN-HCT) 160-12.5 MG per tablet Take  by mouth.     • vitamin C (ASCORBIC ACID) 500 MG tablet Take 500 mg by mouth Every Night.     • vitamin E 200 UNIT capsule Take 200 Units by mouth Every Night.       No current facility-administered medications for this visit.            OBJECTIVE    Pulse 88   Ht 190.5 cm (75\")   Wt (!) 142 kg (313 lb)   SpO2 97%   BMI 39.12 kg/m²     Review of Systems   Constitutional: Negative.    HENT: Negative.    Eyes: Negative.    Respiratory: Negative.    Cardiovascular: Negative.    Gastrointestinal: Negative.    Endocrine: Negative.    Genitourinary: Negative.    Musculoskeletal: Negative.    Skin: Positive for wound.   Neurological: Negative.    Psychiatric/Behavioral: Negative.              Physical Exam   Constitutional: He is oriented to person, place, and time. He appears " well-developed and well-nourished. No distress.   HENT:   Head: Normocephalic and atraumatic.   Eyes: EOM are normal. Pupils are equal, round, and reactive to light.   Pulmonary/Chest: Effort normal. No respiratory distress.   Musculoskeletal: Normal range of motion. He exhibits deformity. He exhibits no tenderness.   Neurological: He is alert and oriented to person, place, and time.   Psychiatric: He has a normal mood and affect. Judgment normal.   Vitals reviewed.    Left Lower Extremity:     Cardiovascular:    CFT brisk  to all digits  Skin temp is warm to warm from proximal tibia to distal digits  Pedal hair growth diminished.   No erythema or edema noted   Musculoskeletal:  Left hallux slightly abducted  Digits 2 through 5 left are rectus  Rocker-bottom foot deformity  Dermatological:   Toenails 1 through 5 are thickened, discolored   No subcutaneous nodules or masses noted    Ulcer to plantar lateral left foot measures  0.9 x 0.4 x 0.2 cm.  No signs of infection.  Neurological:   Protective sensation absent        Procedures        ASSESSMENT AND PLAN    Emre was seen today for follow-up, foot ulcer and tcc change.    Diagnoses and all orders for this visit:    Diabetic ulcer of left midfoot associated with type 2 diabetes mellitus, with fat layer exposed (CMS/HCC)    Charcot's joint of left foot    Type 2 diabetes mellitus with peripheral neuropathy (CMS/HCC)      -  Reapplied total contact cast.    -Discussed continued conservative care versus surgical intervention in detail.  Patient wishes to hold off on surgery at this time.  - All his questions were answered  - Return to clinic 1 week          This document has been electronically signed by Marco A Thompson DPM on October 15, 2019 7:22 AM

## 2019-10-21 ENCOUNTER — OFFICE VISIT (OUTPATIENT)
Dept: PODIATRY | Facility: CLINIC | Age: 59
End: 2019-10-21

## 2019-10-21 VITALS — OXYGEN SATURATION: 99 % | HEART RATE: 79 BPM | HEIGHT: 75 IN | BODY MASS INDEX: 38.92 KG/M2 | WEIGHT: 313 LBS

## 2019-10-21 DIAGNOSIS — L97.422 DIABETIC ULCER OF LEFT MIDFOOT ASSOCIATED WITH TYPE 2 DIABETES MELLITUS, WITH FAT LAYER EXPOSED (HCC): Primary | ICD-10-CM

## 2019-10-21 DIAGNOSIS — E11.621 DIABETIC ULCER OF LEFT MIDFOOT ASSOCIATED WITH TYPE 2 DIABETES MELLITUS, WITH FAT LAYER EXPOSED (HCC): Primary | ICD-10-CM

## 2019-10-21 DIAGNOSIS — M14.672 CHARCOT'S JOINT OF LEFT FOOT: ICD-10-CM

## 2019-10-21 PROCEDURE — 29445 APPL RIGID TOT CNTC LEG CAST: CPT | Performed by: PODIATRIST

## 2019-10-21 NOTE — PROGRESS NOTES
Emre Lisa  1960  59 y.o. male   [place] - 6/13/2019  BS - 112 per patient    Patient presents today for a recheck of his left foot ulcer and TCC change.    10/21/19     Chief Complaint   Patient presents with   • Left Foot - Follow-up       History of Present Illness    Patient presents to clinic for follow-up of his left foot ulcer.   He is ambulating in a TCC.      Past Medical History:   Diagnosis Date   • Arthritis    • Atrial fibrillation (CMS/HCC)    • Diabetes mellitus (CMS/HCC)    • Diabetic foot ulcer associated with type 2 diabetes mellitus (CMS/HCC)     left great toe   • Hallux malleus of left foot    • Hammer toe    • Hypertension    • MI (myocardial infarction) (CMS/HCC)    • Neuropathy in diabetes (CMS/HCC)    • Onychomycosis          Past Surgical History:   Procedure Laterality Date   • AMPUTATION DIGIT Right 3/5/2017    Procedure: RIGHT AMPUTATION TRANSMETATRASAL , RECESSION GASTROCNEMOUS;  Surgeon: Marco A Thompson DPM;  Location: [facility] OR;  Service:    • CARDIAC DEFIBRILLATOR PLACEMENT  2010, 2016   • CARPAL TUNNEL RELEASE  2015    elbow and wrist left arm   • FOOT IRRIGATION, DEBRIDEMENT AND REPAIR Left 3/7/2018    Procedure: FOOT IRRIGATION, DEBRIDEMENT AND REPAIR;  Surgeon: Marco A Thompson DPM;  Location: [facility] OR;  Service:    • FOOT IRRIGATION, DEBRIDEMENT AND REPAIR Left 3/10/2018    Procedure: FOOT IRRIGATION, DEBRIDEMENT AND REPAIR;  Surgeon: Marco A Thompson DPM;  Location: [facility] OR;  Service: Podiatry   • FOOT SURGERY     • FOOT WOUND CLOSURE Right 3/2/2017    Procedure: INCISION AND DRAINAGE, RIGHT FOOT;  Surgeon: Tung Rosenthal DPM;  Location: [facility] OR;  Service:    • HAMMER TOE REPAIR Left 2/15/2018    Procedure: HAMMERTOE CORRECTION LEFT SECOND, THIRD AND FOURTH TOES AND HALLUX INTERPHALANGEAL JOINT ARTHRODESIS LEFT FOOT (Micro Asnis, 4.0 & 5.0 Asnis)      (c-arm);  Surgeon: Marco A Thompson DPM;  Location: [facility] OR;  Service:    • HARDWARE REMOVAL Left 3/5/2018     Procedure: ANKLE/FOOT HARDWARE REMOVAL;  Surgeon: Marco A Thompson DPM;  Location: [facility];  Service:    • INCISION AND DRAINAGE LEG Left 3/5/2018    Procedure: INCISION AND DRAINAGE LOWER EXTREMITY;  Surgeon: Marco A Thompson DPM;  Location: [facility];  Service:    • TOE AMPUTATION Right 2016   • TONSILECTOMY, ADENOIDECTOMY, BILATERAL MYRINGOTOMY AND TUBES      age 25         Family History   Problem Relation Age of Onset   • Diabetes Father    • Stroke Father    • No Known Problems Maternal Grandmother    • No Known Problems Maternal Grandfather    • No Known Problems Paternal Grandmother    • No Known Problems Paternal Grandfather    • No Known Problems Son    • No Known Problems Daughter    • No Known Problems Daughter          Social History     Socioeconomic History   • Marital status:      Spouse name: Not on file   • Number of children: Not on file   • Years of education: Not on file   • Highest education level: Not on file   Tobacco Use   • Smoking status: Never Smoker   • Smokeless tobacco: Never Used   Substance and Sexual Activity   • Alcohol use: No   • Drug use: No   • Sexual activity: Defer         Current Outpatient Medications   Medication Sig Dispense Refill   • aspirin 81 MG chewable tablet Chew 81 mg Every Night.     • Bacitracin Zinc (MAGIC BUTT OINTMENT) Apply 1 each topically 2 (Two) Times a Day. 120 g 1   • cefdinir (OMNICEF) 300 MG capsule      • Cholecalciferol (VITAMIN D3) 5000 UNITS capsule capsule Take 5,000 Units by mouth Every Night.     • CLONIDINE HCL 5 MCG/ML ORAL SOLN Take  by mouth.     • Cyanocobalamin (VITAMIN B 12 PO) Take 1,000 mcg by mouth Every Night.     • doxycycline (VIBRAMYCIN) 100 MG capsule Take 1 capsule by mouth 2 (Two) Times a Day. 28 capsule 0   • dronedarone (MULTAQ) 400 MG tablet Take 400 mg by mouth Every Night.     • fluticasone (FLONASE) 50 MCG/ACT nasal spray 2 sprays into each nostril As Needed for Rhinitis.     • glimepiride (AMARYL) 2 MG tablet  "Take 4 mg by mouth 2 (Two) Times a Day.     • glimepiride (AMARYL) 2 MG tablet TAKE 1 TABLET BY MOUTH 2 (TWO) TIMES DAILY WITH FOOD     • HYDROcodone-acetaminophen (NORCO) 7.5-325 MG per tablet Take 1 tablet by mouth Every 6 (Six) Hours As Needed for Moderate Pain . 30 tablet 0   • losartan (COZAAR) 25 MG tablet      • magnesium oxide (MAGOX) 400 (241.3 Mg) MG tablet tablet Take 400 mg by mouth 2 (Two) Times a Day.     • metFORMIN (GLUCOPHAGE) 1000 MG tablet Take 1,000 mg by mouth 2 (Two) Times a Day With Meals. Patient states he only uses when needed     • metFORMIN (GLUCOPHAGE) 1000 MG tablet TAKE 1 TABLET BY MOUTH 2 (TWO) TIMES DAILY WITH MEALS. INDICATIONS: TYPE 2 DIABETES MELLITUS     • metoprolol succinate XL (TOPROL-XL) 25 MG 24 hr tablet      • metoprolol tartrate (LOPRESSOR) 25 MG tablet Take 12.5 mg by mouth Every Night.     • ONE TOUCH ULTRA TEST test strip USE TO TEST BLOOD SUGAR THREE TIMES A DAY  1   • rosuvastatin (CRESTOR) 10 MG tablet      • valsartan (DIOVAN) 160 MG tablet Take 40 mg by mouth Every Night. 1/2 tablet daily      • valsartan-hydrochlorothiazide (DIOVAN-HCT) 160-12.5 MG per tablet Take  by mouth.     • vitamin C (ASCORBIC ACID) 500 MG tablet Take 500 mg by mouth Every Night.     • vitamin E 200 UNIT capsule Take 200 Units by mouth Every Night.       No current facility-administered medications for this visit.            OBJECTIVE    Pulse 79   Ht 190.5 cm (75\")   Wt (!) 142 kg (313 lb)   SpO2 99%   BMI 39.12 kg/m²     Review of Systems   Constitutional: Negative.    Respiratory: Negative.    Skin: Positive for wound.   Psychiatric/Behavioral: Negative.              Physical Exam   Constitutional: He is oriented to person, place, and time. He appears well-developed and well-nourished. No distress.   HENT:   Head: Normocephalic and atraumatic.   Pulmonary/Chest: Effort normal. No respiratory distress.   Musculoskeletal: Normal range of motion. He exhibits deformity. He exhibits no " tenderness.   Neurological: He is alert and oriented to person, place, and time.   Psychiatric: He has a normal mood and affect. Judgment normal.   Vitals reviewed.    Left Lower Extremity:     Cardiovascular:    CFT brisk  to all digits  Skin temp is warm to warm from proximal tibia to distal digits  Pedal hair growth diminished.   No erythema or edema noted   Musculoskeletal:  Left hallux slightly abducted  Digits 2 through 5 left are rectus  Rocker-bottom foot deformity  Dermatological:   Toenails 1 through 5 are thickened, discolored   No subcutaneous nodules or masses noted    Ulcer to plantar lateral left foot measures  0.9 x 0.2 x 0.2 cm.  No signs of infection.  Neurological:   Protective sensation absent        Procedures        ASSESSMENT AND PLAN    Emre was seen today for follow-up.    Diagnoses and all orders for this visit:    Diabetic ulcer of left midfoot associated with type 2 diabetes mellitus, with fat layer exposed (CMS/HCC)    Charcot's joint of left foot      -  Reapplied total contact cast.    -Patient continues to refuse plantar planing surgery.  - All his questions were answered  - Return to clinic 1 week          This document has been electronically signed by Marco A Thompson DPM on October 21, 2019 1:04 PM

## 2019-10-28 ENCOUNTER — OFFICE VISIT (OUTPATIENT)
Dept: PODIATRY | Facility: CLINIC | Age: 59
End: 2019-10-28

## 2019-10-28 VITALS — HEART RATE: 89 BPM | OXYGEN SATURATION: 97 % | WEIGHT: 313 LBS | BODY MASS INDEX: 38.92 KG/M2 | HEIGHT: 75 IN

## 2019-10-28 DIAGNOSIS — M14.672 CHARCOT'S JOINT OF LEFT FOOT: ICD-10-CM

## 2019-10-28 DIAGNOSIS — L97.422 DIABETIC ULCER OF LEFT MIDFOOT ASSOCIATED WITH TYPE 2 DIABETES MELLITUS, WITH FAT LAYER EXPOSED (HCC): Primary | ICD-10-CM

## 2019-10-28 DIAGNOSIS — E11.621 DIABETIC ULCER OF LEFT MIDFOOT ASSOCIATED WITH TYPE 2 DIABETES MELLITUS, WITH FAT LAYER EXPOSED (HCC): Primary | ICD-10-CM

## 2019-10-28 PROCEDURE — 11042 DBRDMT SUBQ TIS 1ST 20SQCM/<: CPT | Performed by: PODIATRIST

## 2019-10-28 NOTE — PROGRESS NOTES
Emre Lisa  1960  59 y.o. male   [place] - 6/13/2019  BS - 98 per patient    Patient presents today for a recheck of his left foot ulcer and TCC change.    10/28/19     Chief Complaint   Patient presents with   • Left Foot - Foot Ulcer       History of Present Illness    Patient presents to clinic for follow-up of his left foot ulcer.   He is ambulating in a TCC.      Past Medical History:   Diagnosis Date   • Arthritis    • Atrial fibrillation (CMS/HCC)    • Diabetes mellitus (CMS/HCC)    • Diabetic foot ulcer associated with type 2 diabetes mellitus (CMS/HCC)     left great toe   • Hallux malleus of left foot    • Hammer toe    • Hypertension    • MI (myocardial infarction) (CMS/HCC)    • Neuropathy in diabetes (CMS/HCC)    • Onychomycosis          Past Surgical History:   Procedure Laterality Date   • AMPUTATION DIGIT Right 3/5/2017    Procedure: RIGHT AMPUTATION TRANSMETATRASAL , RECESSION GASTROCNEMOUS;  Surgeon: Marco A Thompson DPM;  Location: [facility];  Service:    • CARDIAC DEFIBRILLATOR PLACEMENT  2010, 2016   • CARPAL TUNNEL RELEASE  2015    elbow and wrist left arm   • FOOT IRRIGATION, DEBRIDEMENT AND REPAIR Left 3/7/2018    Procedure: FOOT IRRIGATION, DEBRIDEMENT AND REPAIR;  Surgeon: Marco A Thompson DPM;  Location: [facility] OR;  Service:    • FOOT IRRIGATION, DEBRIDEMENT AND REPAIR Left 3/10/2018    Procedure: FOOT IRRIGATION, DEBRIDEMENT AND REPAIR;  Surgeon: Marco A Thompson DPM;  Location: [facility];  Service: Podiatry   • FOOT SURGERY     • FOOT WOUND CLOSURE Right 3/2/2017    Procedure: INCISION AND DRAINAGE, RIGHT FOOT;  Surgeon: Tung Rosenthal DPM;  Location: [facility] OR;  Service:    • HAMMER TOE REPAIR Left 2/15/2018    Procedure: HAMMERTOE CORRECTION LEFT SECOND, THIRD AND FOURTH TOES AND HALLUX INTERPHALANGEAL JOINT ARTHRODESIS LEFT FOOT (Micro Asnis, 4.0 & 5.0 Asnis)      (c-arm);  Surgeon: Marco A Thompson DPM;  Location: [facility] OR;  Service:    • HARDWARE REMOVAL Left 3/5/2018     Procedure: ANKLE/FOOT HARDWARE REMOVAL;  Surgeon: Marco A Thompson DPM;  Location: [facility];  Service:    • INCISION AND DRAINAGE LEG Left 3/5/2018    Procedure: INCISION AND DRAINAGE LOWER EXTREMITY;  Surgeon: Marco A Thompson DPM;  Location: [facility];  Service:    • TOE AMPUTATION Right 2016   • TONSILECTOMY, ADENOIDECTOMY, BILATERAL MYRINGOTOMY AND TUBES      age 25         Family History   Problem Relation Age of Onset   • Diabetes Father    • Stroke Father    • No Known Problems Maternal Grandmother    • No Known Problems Maternal Grandfather    • No Known Problems Paternal Grandmother    • No Known Problems Paternal Grandfather    • No Known Problems Son    • No Known Problems Daughter    • No Known Problems Daughter          Social History     Socioeconomic History   • Marital status:      Spouse name: Not on file   • Number of children: Not on file   • Years of education: Not on file   • Highest education level: Not on file   Tobacco Use   • Smoking status: Never Smoker   • Smokeless tobacco: Never Used   Substance and Sexual Activity   • Alcohol use: No   • Drug use: No   • Sexual activity: Defer         Current Outpatient Medications   Medication Sig Dispense Refill   • aspirin 81 MG chewable tablet Chew 81 mg Every Night.     • Bacitracin Zinc (MAGIC BUTT OINTMENT) Apply 1 each topically 2 (Two) Times a Day. 120 g 1   • cefdinir (OMNICEF) 300 MG capsule      • Cholecalciferol (VITAMIN D3) 5000 UNITS capsule capsule Take 5,000 Units by mouth Every Night.     • CLONIDINE HCL 5 MCG/ML ORAL SOLN Take  by mouth.     • Cyanocobalamin (VITAMIN B 12 PO) Take 1,000 mcg by mouth Every Night.     • doxycycline (VIBRAMYCIN) 100 MG capsule Take 1 capsule by mouth 2 (Two) Times a Day. 28 capsule 0   • dronedarone (MULTAQ) 400 MG tablet Take 400 mg by mouth Every Night.     • fluticasone (FLONASE) 50 MCG/ACT nasal spray 2 sprays into each nostril As Needed for Rhinitis.     • glimepiride (AMARYL) 2 MG tablet  "Take 4 mg by mouth 2 (Two) Times a Day.     • glimepiride (AMARYL) 2 MG tablet TAKE 1 TABLET BY MOUTH 2 (TWO) TIMES DAILY WITH FOOD     • HYDROcodone-acetaminophen (NORCO) 7.5-325 MG per tablet Take 1 tablet by mouth Every 6 (Six) Hours As Needed for Moderate Pain . 30 tablet 0   • losartan (COZAAR) 25 MG tablet      • magnesium oxide (MAGOX) 400 (241.3 Mg) MG tablet tablet Take 400 mg by mouth 2 (Two) Times a Day.     • metFORMIN (GLUCOPHAGE) 1000 MG tablet Take 1,000 mg by mouth 2 (Two) Times a Day With Meals. Patient states he only uses when needed     • metFORMIN (GLUCOPHAGE) 1000 MG tablet TAKE 1 TABLET BY MOUTH 2 (TWO) TIMES DAILY WITH MEALS. INDICATIONS: TYPE 2 DIABETES MELLITUS     • metoprolol succinate XL (TOPROL-XL) 25 MG 24 hr tablet      • metoprolol tartrate (LOPRESSOR) 25 MG tablet Take 12.5 mg by mouth Every Night.     • ONE TOUCH ULTRA TEST test strip USE TO TEST BLOOD SUGAR THREE TIMES A DAY  1   • rosuvastatin (CRESTOR) 10 MG tablet      • valsartan (DIOVAN) 160 MG tablet Take 40 mg by mouth Every Night. 1/2 tablet daily      • valsartan-hydrochlorothiazide (DIOVAN-HCT) 160-12.5 MG per tablet Take  by mouth.     • vitamin C (ASCORBIC ACID) 500 MG tablet Take 500 mg by mouth Every Night.     • vitamin E 200 UNIT capsule Take 200 Units by mouth Every Night.       No current facility-administered medications for this visit.            OBJECTIVE    Pulse 89   Ht 190.5 cm (75\")   Wt (!) 142 kg (313 lb)   SpO2 97%   BMI 39.12 kg/m²     Review of Systems   Constitutional: Negative.    HENT: Negative.    Eyes: Negative.    Respiratory: Negative.    Cardiovascular: Negative.    Gastrointestinal: Negative.    Skin: Positive for wound.   Psychiatric/Behavioral: Negative.              Physical Exam   Constitutional: He is oriented to person, place, and time. He appears well-developed and well-nourished. No distress.   HENT:   Head: Normocephalic and atraumatic.   Pulmonary/Chest: Effort normal. No " respiratory distress.   Musculoskeletal: Normal range of motion. He exhibits deformity. He exhibits no tenderness.   Neurological: He is alert and oriented to person, place, and time.   Psychiatric: He has a normal mood and affect. Judgment normal.   Vitals reviewed.    Left Lower Extremity:     Cardiovascular:    CFT brisk  to all digits  Skin temp is warm to warm from proximal tibia to distal digits  Pedal hair growth diminished.   No erythema or edema noted   Musculoskeletal:  Left hallux slightly abducted  Digits 2 through 5 left are rectus  Rocker-bottom foot deformity  Dermatological:   Toenails 1 through 5 are thickened, discolored   No subcutaneous nodules or masses noted    Ulcer to plantar lateral left foot measures  1.0 x 0.5 x 0.2 cm.  No signs of infection.  Significant hyperkeratotic tissue buildup with maceration.  Neurological:   Protective sensation absent        Procedures        ASSESSMENT AND PLAN    Emre was seen today for foot ulcer.    Diagnoses and all orders for this visit:    Diabetic ulcer of left midfoot associated with type 2 diabetes mellitus, with fat layer exposed (CMS/HCC)    Charcot's joint of left foot      -Excisional debridement performed with tissue nipper and dermal curette down to and including subcutaneous tissue.  Post debridement measurements are 2.0 x 1.3 x 0.2 cm.  -Reapplied total contact cast.    - Strongly encourage patient to either pursue more aggressive offloading including a nonweightbearing fiberglass cast versus surgical plantar planing   -Patient continues to refuse plantar planing surgery and refuses to be nonweightbearing.  Informed patient that no other treatment options are available to aid in healing this wound.  Informed patient of potential consequences including limb loss if he continues to refuse more aggressive offloading or surgical intervention.  - All his questions were answered  - Return to clinic 1 week          This document has been  electronically signed by Marco A Thompson DPM on October 28, 2019 1:09 PM

## 2019-11-06 ENCOUNTER — OFFICE VISIT (OUTPATIENT)
Dept: PODIATRY | Facility: CLINIC | Age: 59
End: 2019-11-06

## 2019-11-06 VITALS — OXYGEN SATURATION: 98 % | BODY MASS INDEX: 38.92 KG/M2 | HEART RATE: 105 BPM | WEIGHT: 313 LBS | HEIGHT: 75 IN

## 2019-11-06 DIAGNOSIS — E11.621 DIABETIC ULCER OF LEFT MIDFOOT ASSOCIATED WITH TYPE 2 DIABETES MELLITUS, WITH FAT LAYER EXPOSED (HCC): ICD-10-CM

## 2019-11-06 DIAGNOSIS — M14.672 CHARCOT'S JOINT OF LEFT FOOT: Primary | ICD-10-CM

## 2019-11-06 DIAGNOSIS — L97.422 DIABETIC ULCER OF LEFT MIDFOOT ASSOCIATED WITH TYPE 2 DIABETES MELLITUS, WITH FAT LAYER EXPOSED (HCC): ICD-10-CM

## 2019-11-06 PROCEDURE — 99213 OFFICE O/P EST LOW 20 MIN: CPT | Performed by: PODIATRIST

## 2019-11-06 PROCEDURE — 29445 APPL RIGID TOT CNTC LEG CAST: CPT | Performed by: PODIATRIST

## 2019-11-06 RX ORDER — CLINDAMYCIN PHOSPHATE 900 MG/50ML
900 INJECTION INTRAVENOUS ONCE
Status: CANCELLED | OUTPATIENT
Start: 2019-11-21 | End: 2019-11-06

## 2019-11-06 NOTE — PROGRESS NOTES
Emre Lisa  1960  59 y.o. male   [place] - 6/13/2019  BS - 89 per patient    Patient presents today for a recheck of his left foot ulcer and TCC change.    11/07/19     Chief Complaint   Patient presents with   • Left Foot - Follow-up       History of Present Illness    Patient presents to clinic for follow-up of his left foot ulcer.   He is ambulating in a TCC.  He is ready to discuss surgical intervention.      Past Medical History:   Diagnosis Date   • Arthritis    • Atrial fibrillation (CMS/HCC)    • Diabetes mellitus (CMS/HCC)    • Diabetic foot ulcer associated with type 2 diabetes mellitus (CMS/HCC)     left great toe   • Hallux malleus of left foot    • Hammer toe    • Hypertension    • MI (myocardial infarction) (CMS/HCC)    • Neuropathy in diabetes (CMS/HCC)    • Onychomycosis          Past Surgical History:   Procedure Laterality Date   • AMPUTATION DIGIT Right 3/5/2017    Procedure: RIGHT AMPUTATION TRANSMETATRASAL , RECESSION GASTROCNEMOUS;  Surgeon: Marco A Thompson DPM;  Location: [facility];  Service:    • CARDIAC DEFIBRILLATOR PLACEMENT  2010, 2016   • CARPAL TUNNEL RELEASE  2015    elbow and wrist left arm   • FOOT IRRIGATION, DEBRIDEMENT AND REPAIR Left 3/7/2018    Procedure: FOOT IRRIGATION, DEBRIDEMENT AND REPAIR;  Surgeon: Marco A Thompson DPM;  Location: [facility] OR;  Service:    • FOOT IRRIGATION, DEBRIDEMENT AND REPAIR Left 3/10/2018    Procedure: FOOT IRRIGATION, DEBRIDEMENT AND REPAIR;  Surgeon: Marco A Thompson DPM;  Location: [facility];  Service: Podiatry   • FOOT SURGERY     • FOOT WOUND CLOSURE Right 3/2/2017    Procedure: INCISION AND DRAINAGE, RIGHT FOOT;  Surgeon: Tung Rosenthal DPM;  Location: [facility] OR;  Service:    • HAMMER TOE REPAIR Left 2/15/2018    Procedure: HAMMERTOE CORRECTION LEFT SECOND, THIRD AND FOURTH TOES AND HALLUX INTERPHALANGEAL JOINT ARTHRODESIS LEFT FOOT (Micro Asnis, 4.0 & 5.0 Asnis)      (c-arm);  Surgeon: Marco A Thompson DPM;  Location: [facility] OR;   Service:    • HARDWARE REMOVAL Left 3/5/2018    Procedure: ANKLE/FOOT HARDWARE REMOVAL;  Surgeon: Marco A Thompson DPM;  Location: [facility] OR;  Service:    • INCISION AND DRAINAGE LEG Left 3/5/2018    Procedure: INCISION AND DRAINAGE LOWER EXTREMITY;  Surgeon: Marco A Thompson DPM;  Location: [facility];  Service:    • TOE AMPUTATION Right 2016   • TONSILECTOMY, ADENOIDECTOMY, BILATERAL MYRINGOTOMY AND TUBES      age 25         Family History   Problem Relation Age of Onset   • Diabetes Father    • Stroke Father    • No Known Problems Maternal Grandmother    • No Known Problems Maternal Grandfather    • No Known Problems Paternal Grandmother    • No Known Problems Paternal Grandfather    • No Known Problems Son    • No Known Problems Daughter    • No Known Problems Daughter          Social History     Socioeconomic History   • Marital status:      Spouse name: Not on file   • Number of children: Not on file   • Years of education: Not on file   • Highest education level: Not on file   Tobacco Use   • Smoking status: Never Smoker   • Smokeless tobacco: Never Used   Substance and Sexual Activity   • Alcohol use: No   • Drug use: No   • Sexual activity: Defer         Current Outpatient Medications   Medication Sig Dispense Refill   • aspirin 81 MG chewable tablet Chew 81 mg Every Night.     • Bacitracin Zinc (MAGIC BUTT OINTMENT) Apply 1 each topically 2 (Two) Times a Day. 120 g 1   • cefdinir (OMNICEF) 300 MG capsule      • Cholecalciferol (VITAMIN D3) 5000 UNITS capsule capsule Take 5,000 Units by mouth Every Night.     • CLONIDINE HCL 5 MCG/ML ORAL SOLN Take  by mouth.     • Cyanocobalamin (VITAMIN B 12 PO) Take 1,000 mcg by mouth Every Night.     • doxycycline (VIBRAMYCIN) 100 MG capsule Take 1 capsule by mouth 2 (Two) Times a Day. 28 capsule 0   • dronedarone (MULTAQ) 400 MG tablet Take 400 mg by mouth Every Night.     • fluticasone (FLONASE) 50 MCG/ACT nasal spray 2 sprays into each nostril As Needed for  "Rhinitis.     • glimepiride (AMARYL) 2 MG tablet Take 4 mg by mouth 2 (Two) Times a Day.     • glimepiride (AMARYL) 2 MG tablet TAKE 1 TABLET BY MOUTH 2 (TWO) TIMES DAILY WITH FOOD     • HYDROcodone-acetaminophen (NORCO) 7.5-325 MG per tablet Take 1 tablet by mouth Every 6 (Six) Hours As Needed for Moderate Pain . 30 tablet 0   • losartan (COZAAR) 25 MG tablet      • magnesium oxide (MAGOX) 400 (241.3 Mg) MG tablet tablet Take 400 mg by mouth 2 (Two) Times a Day.     • metFORMIN (GLUCOPHAGE) 1000 MG tablet Take 1,000 mg by mouth 2 (Two) Times a Day With Meals. Patient states he only uses when needed     • metFORMIN (GLUCOPHAGE) 1000 MG tablet TAKE 1 TABLET BY MOUTH 2 (TWO) TIMES DAILY WITH MEALS. INDICATIONS: TYPE 2 DIABETES MELLITUS     • metoprolol succinate XL (TOPROL-XL) 25 MG 24 hr tablet      • metoprolol tartrate (LOPRESSOR) 25 MG tablet Take 12.5 mg by mouth Every Night.     • ONE TOUCH ULTRA TEST test strip USE TO TEST BLOOD SUGAR THREE TIMES A DAY  1   • rosuvastatin (CRESTOR) 10 MG tablet      • valsartan (DIOVAN) 160 MG tablet Take 40 mg by mouth Every Night. 1/2 tablet daily      • valsartan-hydrochlorothiazide (DIOVAN-HCT) 160-12.5 MG per tablet Take  by mouth.     • vitamin C (ASCORBIC ACID) 500 MG tablet Take 500 mg by mouth Every Night.     • vitamin E 200 UNIT capsule Take 200 Units by mouth Every Night.       No current facility-administered medications for this visit.            OBJECTIVE    Pulse 105   Ht 190.5 cm (75\")   Wt (!) 142 kg (313 lb)   SpO2 98%   BMI 39.12 kg/m²     Review of Systems   Constitutional: Negative.    HENT: Negative.    Eyes: Negative.    Respiratory: Negative.    Cardiovascular: Negative.    Gastrointestinal: Negative.    Musculoskeletal: Negative.    Skin: Positive for wound.   Psychiatric/Behavioral: Negative.              Physical Exam   Constitutional: He is oriented to person, place, and time. He appears well-developed and well-nourished. No distress.   HENT: "   Head: Normocephalic and atraumatic.   Eyes: EOM are normal. Pupils are equal, round, and reactive to light.   Pulmonary/Chest: Effort normal. No respiratory distress.   Musculoskeletal: Normal range of motion. He exhibits deformity. He exhibits no tenderness.   Neurological: He is alert and oriented to person, place, and time.   Psychiatric: He has a normal mood and affect. Judgment normal.   Vitals reviewed.    Left Lower Extremity:     Cardiovascular:    CFT brisk  to all digits  Skin temp is warm to warm from proximal tibia to distal digits  Pedal hair growth diminished.   No erythema or edema noted   Musculoskeletal:  Left hallux slightly abducted  Digits 2 through 5 left are rectus  Rocker-bottom foot deformity  Dermatological:   Toenails 1 through 5 are thickened, discolored   No subcutaneous nodules or masses noted    Ulcer to plantar lateral left foot measures  1.5 x 0.5 x 0.2 cm.  No signs of infection.   hyperkeratotic tissue.   Neurological:   Protective sensation absent        Procedures        ASSESSMENT AND PLAN    Emre was seen today for follow-up.    Diagnoses and all orders for this visit:    Charcot's joint of left foot  -     Case Request; Standing  -     clindamycin (CLEOCIN) 900 mg in dextrose (D5W) 5 % 100 mL IVPB  -     Case Request  -     XR Foot 3+ View Left    Diabetic ulcer of left midfoot associated with type 2 diabetes mellitus, with fat layer exposed (CMS/HCC)    Other orders  -     Follow Anesthesia Guidelines / Standing Orders; Standing  -     Verify NPO Status; Standing  -     Obtain informed consent (if not collected inpatient or PAT); Standing  -     Notify Physician - Standard; Standing  -     Follow Anesthesia Guidelines / Standing Orders; Future      -Radiographs taken and reviewed.  -Ulcer remains.  Discussed at length continue conservative care versus surgical intervention.  Patient elected for surgical intervention at this time.  Risks and benefits of the surgery were  discussed in detail.  No guarantees were given or implied.  Plan will be for plantar planing left foot.  Tentative date for the surgery is November 21.  -Reapplied total contact cast.    - All his questions were answered  - Return to clinic 1 week    I spent 15 minutes face-to-face with this patient discussing treatment options for his deformity.  Surgery was scheduled during this visit.          This document has been electronically signed by Marco A Thompson DPM on November 7, 2019 1:37 PM

## 2019-11-11 ENCOUNTER — OFFICE VISIT (OUTPATIENT)
Dept: PODIATRY | Facility: CLINIC | Age: 59
End: 2019-11-11

## 2019-11-11 ENCOUNTER — APPOINTMENT (OUTPATIENT)
Dept: PREADMISSION TESTING | Facility: HOSPITAL | Age: 59
End: 2019-11-11

## 2019-11-11 VITALS — OXYGEN SATURATION: 98 % | BODY MASS INDEX: 38.92 KG/M2 | HEIGHT: 75 IN | WEIGHT: 313 LBS | HEART RATE: 95 BPM

## 2019-11-11 VITALS
DIASTOLIC BLOOD PRESSURE: 90 MMHG | HEART RATE: 92 BPM | RESPIRATION RATE: 18 BRPM | HEIGHT: 76 IN | SYSTOLIC BLOOD PRESSURE: 160 MMHG | WEIGHT: 315 LBS | BODY MASS INDEX: 38.36 KG/M2 | OXYGEN SATURATION: 99 %

## 2019-11-11 DIAGNOSIS — M14.672 CHARCOT'S JOINT OF LEFT FOOT: Primary | ICD-10-CM

## 2019-11-11 DIAGNOSIS — L97.422 DIABETIC ULCER OF LEFT MIDFOOT ASSOCIATED WITH TYPE 2 DIABETES MELLITUS, WITH FAT LAYER EXPOSED (HCC): ICD-10-CM

## 2019-11-11 DIAGNOSIS — E11.621 DIABETIC ULCER OF LEFT MIDFOOT ASSOCIATED WITH TYPE 2 DIABETES MELLITUS, WITH FAT LAYER EXPOSED (HCC): ICD-10-CM

## 2019-11-11 DIAGNOSIS — E11.42 TYPE 2 DIABETES MELLITUS WITH PERIPHERAL NEUROPATHY (HCC): ICD-10-CM

## 2019-11-11 LAB
ANION GAP SERPL CALCULATED.3IONS-SCNC: 13 MMOL/L (ref 5–15)
BUN BLD-MCNC: 20 MG/DL (ref 6–20)
BUN/CREAT SERPL: 20.6 (ref 7–25)
CALCIUM SPEC-SCNC: 9.1 MG/DL (ref 8.6–10.5)
CHLORIDE SERPL-SCNC: 100 MMOL/L (ref 98–107)
CO2 SERPL-SCNC: 27 MMOL/L (ref 22–29)
CREAT BLD-MCNC: 0.97 MG/DL (ref 0.76–1.27)
GFR SERPL CREATININE-BSD FRML MDRD: 80 ML/MIN/1.73
GLUCOSE BLD-MCNC: 87 MG/DL (ref 65–99)
POTASSIUM BLD-SCNC: 4.6 MMOL/L (ref 3.5–5.2)
SODIUM BLD-SCNC: 140 MMOL/L (ref 136–145)

## 2019-11-11 PROCEDURE — 36415 COLL VENOUS BLD VENIPUNCTURE: CPT

## 2019-11-11 PROCEDURE — 29445 APPL RIGID TOT CNTC LEG CAST: CPT | Performed by: PODIATRIST

## 2019-11-11 PROCEDURE — 93005 ELECTROCARDIOGRAM TRACING: CPT

## 2019-11-11 PROCEDURE — 80048 BASIC METABOLIC PNL TOTAL CA: CPT | Performed by: ANESTHESIOLOGY

## 2019-11-11 PROCEDURE — 93010 ELECTROCARDIOGRAM REPORT: CPT | Performed by: INTERNAL MEDICINE

## 2019-11-11 RX ORDER — SODIUM CHLORIDE 9 MG/ML
1000 INJECTION, SOLUTION INTRAVENOUS CONTINUOUS
Status: CANCELLED | OUTPATIENT
Start: 2019-11-21

## 2019-11-11 RX ORDER — METOPROLOL SUCCINATE 25 MG/1
25 TABLET, EXTENDED RELEASE ORAL 2 TIMES DAILY
COMMUNITY

## 2019-11-11 NOTE — PAT
Pt takes ASA for prevention only.  Instructed him to stop 1 week prior to surgery, understanding verbalized.    Pt in cast, did NOT give chlorhexidine scrub.   English

## 2019-11-11 NOTE — PROGRESS NOTES
Emre Lisa  1960  59 y.o. male   [place] - 6/13/2019  BS -115  per patient    Patient presents today for a recheck of his left foot ulcer and TCC change.    11/11/19     Chief Complaint   Patient presents with   • Left Foot - Wound Check, Follow-up       History of Present Illness    Patient presents to clinic for follow-up of his left foot ulcer.   He is ambulating in a TCC.         Past Medical History:   Diagnosis Date   • Arthritis    • Atrial fibrillation (CMS/HCC)    • Diabetes mellitus (CMS/HCC)    • Diabetic foot ulcer associated with type 2 diabetes mellitus (CMS/HCC)     left great toe   • Hallux malleus of left foot    • Hammer toe    • Hypertension    • MI (myocardial infarction) (CMS/HCC)    • Neuropathy in diabetes (CMS/HCC)    • Onychomycosis    • Presence of biventricular cardiac pacemaker          Past Surgical History:   Procedure Laterality Date   • AMPUTATION DIGIT Right 3/5/2017    Procedure: RIGHT AMPUTATION TRANSMETATRASAL , RECESSION GASTROCNEMOUS;  Surgeon: Marco A Thompson DPM;  Location: [facility];  Service:    • CARDIAC DEFIBRILLATOR PLACEMENT  2010, 2016   • CARPAL TUNNEL RELEASE  2015    elbow and wrist left arm   • FOOT IRRIGATION, DEBRIDEMENT AND REPAIR Left 3/7/2018    Procedure: FOOT IRRIGATION, DEBRIDEMENT AND REPAIR;  Surgeon: Marco A Thompson DPM;  Location: [facility];  Service:    • FOOT IRRIGATION, DEBRIDEMENT AND REPAIR Left 3/10/2018    Procedure: FOOT IRRIGATION, DEBRIDEMENT AND REPAIR;  Surgeon: Marco A Thompson DPM;  Location: [facility];  Service: Podiatry   • FOOT WOUND CLOSURE Right 3/2/2017    Procedure: INCISION AND DRAINAGE, RIGHT FOOT;  Surgeon: Tung Rosenthal DPM;  Location: [facility];  Service:    • HAMMER TOE REPAIR Left 2/15/2018    Procedure: HAMMERTOE CORRECTION LEFT SECOND, THIRD AND FOURTH TOES AND HALLUX INTERPHALANGEAL JOINT ARTHRODESIS LEFT FOOT (Micro Asnis, 4.0 & 5.0 Asnis)      (c-arm);  Surgeon: Marco A Thompson DPM;  Location: [facility] OR;   Service:    • HARDWARE REMOVAL Left 3/5/2018    Procedure: ANKLE/FOOT HARDWARE REMOVAL;  Surgeon: Marco A Thompson DPM;  Location: [facility] OR;  Service:    • INCISION AND DRAINAGE LEG Left 3/5/2018    Procedure: INCISION AND DRAINAGE LOWER EXTREMITY;  Surgeon: Marco A Thompson DPM;  Location: [facility];  Service:    • TOE AMPUTATION Right 2016   • TONSILECTOMY, ADENOIDECTOMY, BILATERAL MYRINGOTOMY AND TUBES      age 25         Family History   Problem Relation Age of Onset   • Diabetes Father    • Stroke Father    • No Known Problems Maternal Grandmother    • No Known Problems Maternal Grandfather    • No Known Problems Paternal Grandmother    • No Known Problems Paternal Grandfather    • No Known Problems Son    • No Known Problems Daughter    • No Known Problems Daughter          Social History     Socioeconomic History   • Marital status:      Spouse name: Not on file   • Number of children: Not on file   • Years of education: Not on file   • Highest education level: Not on file   Tobacco Use   • Smoking status: Never Smoker   • Smokeless tobacco: Never Used   Substance and Sexual Activity   • Alcohol use: No   • Drug use: No   • Sexual activity: Defer         Current Outpatient Medications   Medication Sig Dispense Refill   • aspirin 81 MG chewable tablet Chew 81 mg Every Night.     • Cholecalciferol (VITAMIN D3) 5000 UNITS capsule capsule Take 5,000 Units by mouth Every Night.     • dronedarone (MULTAQ) 400 MG tablet Take 400 mg by mouth Every Night.     • fluticasone (FLONASE) 50 MCG/ACT nasal spray 2 sprays into each nostril As Needed for Rhinitis.     • glimepiride (AMARYL) 2 MG tablet Take 4 mg by mouth 2 (Two) Times a Day.     • losartan (COZAAR) 25 MG tablet Take 25 mg by mouth Every Night.     • magnesium oxide (MAGOX) 400 (241.3 Mg) MG tablet tablet Take 400 mg by mouth 2 (Two) Times a Day.     • metFORMIN (GLUCOPHAGE) 1000 MG tablet Take 1,000 mg by mouth 2 (Two) Times a Day With Meals.     • ONE  "TOUCH ULTRA TEST test strip USE TO TEST BLOOD SUGAR THREE TIMES A DAY  1   • vitamin C (ASCORBIC ACID) 500 MG tablet Take 500 mg by mouth Every Night.     • vitamin E 200 UNIT capsule Take 200 Units by mouth Every Night.     • metoprolol succinate XL (TOPROL-XL) 25 MG 24 hr tablet Take 25 mg by mouth Every Night. 0.5 tablet       No current facility-administered medications for this visit.            OBJECTIVE    Pulse 95   Ht 190.5 cm (75\")   Wt (!) 142 kg (313 lb)   SpO2 98%   BMI 39.12 kg/m²     Review of Systems   Constitutional: Negative.    HENT: Negative.    Eyes: Negative.    Respiratory: Negative.    Cardiovascular: Negative.    Musculoskeletal: Negative.    Skin: Positive for wound.   Psychiatric/Behavioral: Negative.              Physical Exam   Constitutional: He is oriented to person, place, and time. He appears well-developed and well-nourished. No distress.   HENT:   Head: Normocephalic and atraumatic.   Pulmonary/Chest: Effort normal. No respiratory distress.   Musculoskeletal: Normal range of motion. He exhibits deformity. He exhibits no tenderness.   Neurological: He is alert and oriented to person, place, and time.   Psychiatric: He has a normal mood and affect. Judgment normal.   Vitals reviewed.    Left Lower Extremity:     Cardiovascular:    CFT brisk  to all digits  Skin temp is warm to warm from proximal tibia to distal digits  Pedal hair growth diminished.   No erythema or edema noted   Musculoskeletal:  Left hallux slightly abducted  Digits 2 through 5 left are rectus  Rocker-bottom foot deformity  Dermatological:   Toenails 1 through 5 are thickened, discolored   No subcutaneous nodules or masses noted    Ulcer to plantar lateral left foot measures  1.0 x 0.4 x 0.2 cm.  No signs of infection.   hyperkeratotic tissue.   Neurological:   Protective sensation absent        Procedures        ASSESSMENT AND PLAN    Emre was seen today for wound check and follow-up.    Diagnoses and all " orders for this visit:    Charcot's joint of left foot    Diabetic ulcer of left midfoot associated with type 2 diabetes mellitus, with fat layer exposed (CMS/HCC)    Type 2 diabetes mellitus with peripheral neuropathy (CMS/HCC)      - Tentative date for surgical intervention is November 21.  - Reapplied total contact cast.    - All his questions were answered  - Return to clinic 1 week            This document has been electronically signed by Marco A Thompson DPM on November 11, 2019 12:17 PM

## 2019-11-11 NOTE — DISCHARGE INSTRUCTIONS
[facility]  Pre-op Information and Guidelines    You will be called after 2 p.m. the day before your surgery (Friday for Monday surgery) and notified of your time for arrival and approximate surgery time.  If you have not received a call by 4P.M., please contact Same Day Surgery at [phone] of if outside [facility] call [phone].    Please Follow these Important Safety Guidelines:    • The morning of your procedure, take only the medications listed below with   A sip of water:_____________________________________________       ______________________________________________    • DO NOT eat or drink anything after 12:00 midnight the night before surgery  Specific instructions concerning drinking clear liquids will be discussed during  the pre-surgery instruction call the day before your surgery.    • If you take a blood thinner (ex. Plavix, Coumadin, aspirin), ask your doctor when to stop it before surgery  STOP DATE: _________________    • Only 2 visitors are allowed in patient rooms at a time  Your visitors will be asked to wait in the lobby until the admission process is complete with the exception of a parent with a child and patients in need of special assistance.    • YOU CANNOT DRIVE YOURSELF HOME  You must be accompanied by someone who will be responsible for driving you home after surgery and for your care at home.    • DO NOT chew gum, use breath mints, hard candy, or smoke the day of surgery  • DO NOT drink alcohol for at least 24 hours before your surgery  • DO NOT wear any jewelry and remove all body piercing before coming to the hospital  • DO NOT wear make-up to the hospital  • If you are having surgery on an extremity (arm/leg/foot) remove nail polish/artificial nails on the surgical side  • Clothing, glasses, contacts, dentures, and hairpieces must be removed before surgery  • Bathe the night before or the morning of your surgery and do not use powders/lotions on  skin.

## 2019-11-18 ENCOUNTER — OFFICE VISIT (OUTPATIENT)
Dept: PODIATRY | Facility: CLINIC | Age: 59
End: 2019-11-18

## 2019-11-18 VITALS — HEART RATE: 89 BPM | BODY MASS INDEX: 38.36 KG/M2 | HEIGHT: 76 IN | OXYGEN SATURATION: 97 % | WEIGHT: 315 LBS

## 2019-11-18 DIAGNOSIS — L97.422 DIABETIC ULCER OF LEFT MIDFOOT ASSOCIATED WITH TYPE 2 DIABETES MELLITUS, WITH FAT LAYER EXPOSED (HCC): ICD-10-CM

## 2019-11-18 DIAGNOSIS — M14.672 CHARCOT'S JOINT OF LEFT FOOT: Primary | ICD-10-CM

## 2019-11-18 DIAGNOSIS — E11.42 TYPE 2 DIABETES MELLITUS WITH PERIPHERAL NEUROPATHY (HCC): ICD-10-CM

## 2019-11-18 DIAGNOSIS — E11.621 DIABETIC ULCER OF LEFT MIDFOOT ASSOCIATED WITH TYPE 2 DIABETES MELLITUS, WITH FAT LAYER EXPOSED (HCC): ICD-10-CM

## 2019-11-18 PROCEDURE — 11042 DBRDMT SUBQ TIS 1ST 20SQCM/<: CPT | Performed by: PODIATRIST

## 2019-11-18 NOTE — PROGRESS NOTES
Emre Lisa  1960  59 y.o. male   [place] - 6/13/2019  BS -98  per patient    Patient presents today for a recheck of his left foot ulcer and TCC.     11/18/19     Chief Complaint   Patient presents with   • Left Foot - TCC       History of Present Illness    Patient presents to clinic for follow-up of his left foot ulcer.   He is ambulating in a TCC.   Plan for plantar planing on Thursday, November 21.      Past Medical History:   Diagnosis Date   • Arthritis    • Atrial fibrillation (CMS/HCC)    • Diabetes mellitus (CMS/HCC)    • Diabetic foot ulcer associated with type 2 diabetes mellitus (CMS/HCC)     left great toe   • Hallux malleus of left foot    • Hammer toe    • Hypertension    • MI (myocardial infarction) (CMS/HCC)    • Neuropathy in diabetes (CMS/HCC)    • Onychomycosis    • Presence of biventricular cardiac pacemaker          Past Surgical History:   Procedure Laterality Date   • AMPUTATION DIGIT Right 3/5/2017    Procedure: RIGHT AMPUTATION TRANSMETATRASAL , RECESSION GASTROCNEMOUS;  Surgeon: Marco A Thompson DPM;  Location: [facility];  Service:    • CARDIAC DEFIBRILLATOR PLACEMENT  2010, 2016   • CARPAL TUNNEL RELEASE  2015    elbow and wrist left arm   • FOOT IRRIGATION, DEBRIDEMENT AND REPAIR Left 3/7/2018    Procedure: FOOT IRRIGATION, DEBRIDEMENT AND REPAIR;  Surgeon: Marco A Thompson DPM;  Location: [facility];  Service:    • FOOT IRRIGATION, DEBRIDEMENT AND REPAIR Left 3/10/2018    Procedure: FOOT IRRIGATION, DEBRIDEMENT AND REPAIR;  Surgeon: Marco A Thompson DPM;  Location: [facility] OR;  Service: Podiatry   • FOOT WOUND CLOSURE Right 3/2/2017    Procedure: INCISION AND DRAINAGE, RIGHT FOOT;  Surgeon: Tung Rosenthal DPM;  Location: [facility];  Service:    • HAMMER TOE REPAIR Left 2/15/2018    Procedure: HAMMERTOE CORRECTION LEFT SECOND, THIRD AND FOURTH TOES AND HALLUX INTERPHALANGEAL JOINT ARTHRODESIS LEFT FOOT (Micro Asnis, 4.0 & 5.0 Asnis)      (c-arm);  Surgeon: Marco A Thompson  CHRISTIAN;  Location: [facility] OR;  Service:    • HARDWARE REMOVAL Left 3/5/2018    Procedure: ANKLE/FOOT HARDWARE REMOVAL;  Surgeon: Marco A Thompson DPM;  Location: [facility] OR;  Service:    • INCISION AND DRAINAGE LEG Left 3/5/2018    Procedure: INCISION AND DRAINAGE LOWER EXTREMITY;  Surgeon: Marco A Thompson DPM;  Location: [facility];  Service:    • TOE AMPUTATION Right 2016   • TONSILECTOMY, ADENOIDECTOMY, BILATERAL MYRINGOTOMY AND TUBES      age 25         Family History   Problem Relation Age of Onset   • Diabetes Father    • Stroke Father    • No Known Problems Maternal Grandmother    • No Known Problems Maternal Grandfather    • No Known Problems Paternal Grandmother    • No Known Problems Paternal Grandfather    • No Known Problems Son    • No Known Problems Daughter    • No Known Problems Daughter          Social History     Socioeconomic History   • Marital status:      Spouse name: Not on file   • Number of children: Not on file   • Years of education: Not on file   • Highest education level: Not on file   Tobacco Use   • Smoking status: Never Smoker   • Smokeless tobacco: Never Used   Substance and Sexual Activity   • Alcohol use: No   • Drug use: No   • Sexual activity: Defer         Current Outpatient Medications   Medication Sig Dispense Refill   • aspirin 81 MG chewable tablet Chew 81 mg Every Night.     • Cholecalciferol (VITAMIN D3) 5000 UNITS capsule capsule Take 5,000 Units by mouth Every Night.     • dronedarone (MULTAQ) 400 MG tablet Take 400 mg by mouth Every Night.     • fluticasone (FLONASE) 50 MCG/ACT nasal spray 2 sprays into each nostril As Needed for Rhinitis.     • glimepiride (AMARYL) 2 MG tablet Take 4 mg by mouth 2 (Two) Times a Day.     • losartan (COZAAR) 25 MG tablet Take 25 mg by mouth Every Night.     • magnesium oxide (MAGOX) 400 (241.3 Mg) MG tablet tablet Take 400 mg by mouth 2 (Two) Times a Day.     • metFORMIN (GLUCOPHAGE) 1000 MG tablet Take 1,000 mg by mouth 2 (Two) Times a  "Day With Meals.     • metoprolol succinate XL (TOPROL-XL) 25 MG 24 hr tablet Take 25 mg by mouth Every Night. 0.5 tablet     • ONE TOUCH ULTRA TEST test strip USE TO TEST BLOOD SUGAR THREE TIMES A DAY  1   • vitamin C (ASCORBIC ACID) 500 MG tablet Take 500 mg by mouth Every Night.     • vitamin E 200 UNIT capsule Take 200 Units by mouth Every Night.       No current facility-administered medications for this visit.            OBJECTIVE    Pulse 89   Ht 193 cm (76\")   Wt (!) 144 kg (318 lb)   SpO2 97%   BMI 38.71 kg/m²     Review of Systems   Constitutional: Negative.    HENT: Negative.    Eyes: Negative.    Respiratory: Negative.    Cardiovascular: Negative.    Gastrointestinal: Negative.    Musculoskeletal: Negative.    Skin: Positive for wound.   Psychiatric/Behavioral: Negative.              Physical Exam   Constitutional: He is oriented to person, place, and time. He appears well-developed and well-nourished. No distress.   HENT:   Head: Normocephalic and atraumatic.   Nose: Nose normal.   Eyes: Conjunctivae and EOM are normal. Pupils are equal, round, and reactive to light.   Pulmonary/Chest: Effort normal. No respiratory distress.   Musculoskeletal: Normal range of motion. He exhibits deformity. He exhibits no tenderness.   Neurological: He is alert and oriented to person, place, and time.   Psychiatric: He has a normal mood and affect. His behavior is normal. Judgment normal.   Vitals reviewed.    Left Lower Extremity:     Cardiovascular:    CFT brisk  to all digits  Skin temp is warm to warm from proximal tibia to distal digits  Pedal hair growth diminished.   No erythema or edema noted   Musculoskeletal:  Left hallux slightly abducted  Digits 2 through 5 left are rectus  Rocker-bottom foot deformity  Dermatological:   Toenails 1 through 5 are thickened, discolored   No subcutaneous nodules or masses noted    Ulcer to plantar lateral left foot measures  0.8 x 0.4 x 0.2 cm.  No signs of infection.   " hyperkeratotic border.   Neurological:   Protective sensation absent        Procedures        ASSESSMENT AND PLAN    Emre was seen today for tcc.    Diagnoses and all orders for this visit:    Charcot's joint of left foot    Diabetic ulcer of left midfoot associated with type 2 diabetes mellitus, with fat layer exposed (CMS/HCC)    Type 2 diabetes mellitus with peripheral neuropathy (CMS/HCC)      - Excisional debridement performed with tissue nippers and dermal curette down to level of subcutaneous tissue.  Post debridement measurements are 0.9 x 0.5 x 0.2 cm.  Ulcer was dressed.  Patient to start daily dressing changes.  Weightbearing in offloaded cam boot.  Plan for plantar planing on November 21.  Rediscussed risks and benefits.  No guarantees were given or implied.  All his questions were answered.  - Recheck following surgery            This document has been electronically signed by Marco A Thompson DPM on November 18, 2019 11:28 AM

## 2019-11-21 ENCOUNTER — APPOINTMENT (OUTPATIENT)
Dept: GENERAL RADIOLOGY | Facility: HOSPITAL | Age: 59
End: 2019-11-21

## 2019-11-21 ENCOUNTER — ANESTHESIA (OUTPATIENT)
Dept: PERIOP | Facility: HOSPITAL | Age: 59
End: 2019-11-21

## 2019-11-21 ENCOUNTER — HOSPITAL ENCOUNTER (OUTPATIENT)
Facility: HOSPITAL | Age: 59
Setting detail: HOSPITAL OUTPATIENT SURGERY
Discharge: HOME | End: 2019-11-21
Attending: PODIATRIST | Admitting: PODIATRIST

## 2019-11-21 ENCOUNTER — ANESTHESIA EVENT (OUTPATIENT)
Dept: PERIOP | Facility: HOSPITAL | Age: 59
End: 2019-11-21

## 2019-11-21 VITALS
OXYGEN SATURATION: 98 % | BODY MASS INDEX: 38.36 KG/M2 | DIASTOLIC BLOOD PRESSURE: 66 MMHG | SYSTOLIC BLOOD PRESSURE: 112 MMHG | TEMPERATURE: 97.4 F | HEIGHT: 76 IN | RESPIRATION RATE: 18 BRPM | HEART RATE: 71 BPM | WEIGHT: 315 LBS

## 2019-11-21 DIAGNOSIS — M14.672 CHARCOT'S JOINT OF LEFT FOOT: ICD-10-CM

## 2019-11-21 LAB — GLUCOSE BLDC GLUCOMTR-MCNC: 92 MG/DL (ref 70–130)

## 2019-11-21 PROCEDURE — 28122 PARTIAL REMOVAL OF FOOT BONE: CPT | Performed by: PODIATRIST

## 2019-11-21 PROCEDURE — 25010000002 ONDANSETRON PER 1 MG: Performed by: NURSE ANESTHETIST, CERTIFIED REGISTERED

## 2019-11-21 PROCEDURE — 82962 GLUCOSE BLOOD TEST: CPT

## 2019-11-21 PROCEDURE — 76000 FLUOROSCOPY <1 HR PHYS/QHP: CPT

## 2019-11-21 PROCEDURE — 25010000002 MIDAZOLAM PER 1 MG: Performed by: NURSE ANESTHETIST, CERTIFIED REGISTERED

## 2019-11-21 PROCEDURE — 11042 DBRDMT SUBQ TIS 1ST 20SQCM/<: CPT | Performed by: PODIATRIST

## 2019-11-21 PROCEDURE — 25010000002 FENTANYL CITRATE (PF) 100 MCG/2ML SOLUTION: Performed by: NURSE ANESTHETIST, CERTIFIED REGISTERED

## 2019-11-21 PROCEDURE — 25010000002 PROPOFOL 10 MG/ML EMULSION: Performed by: NURSE ANESTHETIST, CERTIFIED REGISTERED

## 2019-11-21 RX ORDER — EPHEDRINE SULFATE 50 MG/ML
5 INJECTION, SOLUTION INTRAVENOUS ONCE AS NEEDED
Status: DISCONTINUED | OUTPATIENT
Start: 2019-11-21 | End: 2019-11-21 | Stop reason: HOSPADM

## 2019-11-21 RX ORDER — ACETAMINOPHEN 325 MG/1
650 TABLET ORAL ONCE AS NEEDED
Status: DISCONTINUED | OUTPATIENT
Start: 2019-11-21 | End: 2019-11-21 | Stop reason: HOSPADM

## 2019-11-21 RX ORDER — PROMETHAZINE HYDROCHLORIDE 25 MG/1
25 SUPPOSITORY RECTAL ONCE AS NEEDED
Status: DISCONTINUED | OUTPATIENT
Start: 2019-11-21 | End: 2019-11-21 | Stop reason: HOSPADM

## 2019-11-21 RX ORDER — ONDANSETRON 2 MG/ML
INJECTION INTRAMUSCULAR; INTRAVENOUS AS NEEDED
Status: DISCONTINUED | OUTPATIENT
Start: 2019-11-21 | End: 2019-11-21 | Stop reason: SURG

## 2019-11-21 RX ORDER — PROMETHAZINE HYDROCHLORIDE 25 MG/ML
12.5 INJECTION, SOLUTION INTRAMUSCULAR; INTRAVENOUS ONCE AS NEEDED
Status: DISCONTINUED | OUTPATIENT
Start: 2019-11-21 | End: 2019-11-21 | Stop reason: HOSPADM

## 2019-11-21 RX ORDER — SODIUM CHLORIDE 9 MG/ML
1000 INJECTION, SOLUTION INTRAVENOUS CONTINUOUS
Status: DISCONTINUED | OUTPATIENT
Start: 2019-11-21 | End: 2019-11-21 | Stop reason: HOSPADM

## 2019-11-21 RX ORDER — NALOXONE HCL 0.4 MG/ML
0.4 VIAL (ML) INJECTION AS NEEDED
Status: DISCONTINUED | OUTPATIENT
Start: 2019-11-21 | End: 2019-11-21 | Stop reason: HOSPADM

## 2019-11-21 RX ORDER — FENTANYL CITRATE 50 UG/ML
INJECTION, SOLUTION INTRAMUSCULAR; INTRAVENOUS AS NEEDED
Status: DISCONTINUED | OUTPATIENT
Start: 2019-11-21 | End: 2019-11-21 | Stop reason: SURG

## 2019-11-21 RX ORDER — ONDANSETRON 2 MG/ML
4 INJECTION INTRAMUSCULAR; INTRAVENOUS ONCE AS NEEDED
Status: DISCONTINUED | OUTPATIENT
Start: 2019-11-21 | End: 2019-11-21 | Stop reason: HOSPADM

## 2019-11-21 RX ORDER — DIPHENHYDRAMINE HYDROCHLORIDE 50 MG/ML
12.5 INJECTION INTRAMUSCULAR; INTRAVENOUS
Status: DISCONTINUED | OUTPATIENT
Start: 2019-11-21 | End: 2019-11-21 | Stop reason: HOSPADM

## 2019-11-21 RX ORDER — LIDOCAINE HYDROCHLORIDE 20 MG/ML
INJECTION, SOLUTION INFILTRATION; PERINEURAL AS NEEDED
Status: DISCONTINUED | OUTPATIENT
Start: 2019-11-21 | End: 2019-11-21 | Stop reason: SURG

## 2019-11-21 RX ORDER — PROPOFOL 10 MG/ML
VIAL (ML) INTRAVENOUS AS NEEDED
Status: DISCONTINUED | OUTPATIENT
Start: 2019-11-21 | End: 2019-11-21 | Stop reason: SURG

## 2019-11-21 RX ORDER — LABETALOL HYDROCHLORIDE 5 MG/ML
5 INJECTION, SOLUTION INTRAVENOUS
Status: DISCONTINUED | OUTPATIENT
Start: 2019-11-21 | End: 2019-11-21 | Stop reason: HOSPADM

## 2019-11-21 RX ORDER — MIDAZOLAM HYDROCHLORIDE 1 MG/ML
INJECTION INTRAMUSCULAR; INTRAVENOUS AS NEEDED
Status: DISCONTINUED | OUTPATIENT
Start: 2019-11-21 | End: 2019-11-21 | Stop reason: SURG

## 2019-11-21 RX ORDER — PROMETHAZINE HYDROCHLORIDE 25 MG/1
25 TABLET ORAL ONCE AS NEEDED
Status: DISCONTINUED | OUTPATIENT
Start: 2019-11-21 | End: 2019-11-21 | Stop reason: HOSPADM

## 2019-11-21 RX ORDER — EPHEDRINE SULFATE 50 MG/ML
INJECTION, SOLUTION INTRAVENOUS AS NEEDED
Status: DISCONTINUED | OUTPATIENT
Start: 2019-11-21 | End: 2019-11-21 | Stop reason: SURG

## 2019-11-21 RX ORDER — SODIUM CHLORIDE, SODIUM GLUCONATE, SODIUM ACETATE, POTASSIUM CHLORIDE, AND MAGNESIUM CHLORIDE 526; 502; 368; 37; 30 MG/100ML; MG/100ML; MG/100ML; MG/100ML; MG/100ML
INJECTION, SOLUTION INTRAVENOUS CONTINUOUS PRN
Status: DISCONTINUED | OUTPATIENT
Start: 2019-11-21 | End: 2019-11-21 | Stop reason: SURG

## 2019-11-21 RX ORDER — ACETAMINOPHEN 650 MG/1
650 SUPPOSITORY RECTAL ONCE AS NEEDED
Status: DISCONTINUED | OUTPATIENT
Start: 2019-11-21 | End: 2019-11-21 | Stop reason: HOSPADM

## 2019-11-21 RX ORDER — FLUMAZENIL 0.1 MG/ML
0.2 INJECTION INTRAVENOUS AS NEEDED
Status: DISCONTINUED | OUTPATIENT
Start: 2019-11-21 | End: 2019-11-21 | Stop reason: HOSPADM

## 2019-11-21 RX ORDER — CLINDAMYCIN PHOSPHATE 900 MG/50ML
900 INJECTION INTRAVENOUS ONCE
Status: COMPLETED | OUTPATIENT
Start: 2019-11-21 | End: 2019-11-21

## 2019-11-21 RX ADMIN — CLINDAMYCIN IN 5 PERCENT DEXTROSE 900 MG: 18 INJECTION, SOLUTION INTRAVENOUS at 07:13

## 2019-11-21 RX ADMIN — LIDOCAINE HYDROCHLORIDE 5 MG: 20 INJECTION, SOLUTION INFILTRATION; PERINEURAL at 08:06

## 2019-11-21 RX ADMIN — FENTANYL CITRATE 50 MCG: 50 INJECTION, SOLUTION INTRAMUSCULAR; INTRAVENOUS at 07:13

## 2019-11-21 RX ADMIN — LIDOCAINE HYDROCHLORIDE 60 MG: 20 INJECTION, SOLUTION INFILTRATION; PERINEURAL at 07:13

## 2019-11-21 RX ADMIN — PROPOFOL 150 MG: 10 INJECTION, EMULSION INTRAVENOUS at 07:13

## 2019-11-21 RX ADMIN — ONDANSETRON 4 MG: 2 INJECTION INTRAMUSCULAR; INTRAVENOUS at 07:24

## 2019-11-21 RX ADMIN — MIDAZOLAM HYDROCHLORIDE 2 MG: 2 INJECTION, SOLUTION INTRAMUSCULAR; INTRAVENOUS at 07:02

## 2019-11-21 RX ADMIN — SODIUM CHLORIDE, SODIUM GLUCONATE, SODIUM ACETATE, POTASSIUM CHLORIDE, AND MAGNESIUM CHLORIDE: 526; 502; 368; 37; 30 INJECTION, SOLUTION INTRAVENOUS at 08:12

## 2019-11-21 RX ADMIN — EPHEDRINE SULFATE 5 MG: 50 INJECTION INTRAVENOUS at 07:42

## 2019-11-21 RX ADMIN — SODIUM CHLORIDE 1000 ML: 9 INJECTION, SOLUTION INTRAVENOUS at 06:13

## 2019-11-21 NOTE — ANESTHESIA PROCEDURE NOTES
Airway  Date/Time: 11/21/2019 7:19 AM    General Information and Staff    Patient location during procedure: OR  CRNA: Josefina Sandra CRNA    Indications and Patient Condition  Indications for airway management: airway protection    Preoxygenated: yes  Mask difficulty assessment: 0 - not attempted    Final Airway Details  Final airway type: supraglottic airway      Successful airway: I-gel  Size 5    Assessment: lips, teeth, and gum same as pre-op and atraumatic intubation    Additional Comments  LMA placed by Lisa PHILLIPS

## 2019-11-21 NOTE — ANESTHESIA POSTPROCEDURE EVALUATION
Patient: Emre Lisa    Procedure Summary     Date:  11/21/19 Room / Location:  [facility] OR 08 / [facility] OR    Anesthesia Start:  0708 Anesthesia Stop:  0833    Procedure:  PLANTAR PLANING LEFT FOOT AND ALL OTHER INDICATED PROCEDURES (Left Foot) Diagnosis:       Charcot's joint of left foot      (Charcot's joint of left foot [M14.672])    Surgeon:  Marco A Thompson DPM Provider:  Hugh Christensen MD    Anesthesia Type:  general ASA Status:  3          Anesthesia Type: general  Last vitals  BP   127/79 (11/21/19 0611)   Temp   97.1 °F (36.2 °C) (11/21/19 0611)   Pulse   77 (11/21/19 0611)   Resp   17 (11/21/19 0611)     SpO2   96 % (11/21/19 0611)     Post Anesthesia Care and Evaluation    Patient location during evaluation: PACU  Patient participation: complete - patient participated  Level of consciousness: sleepy but conscious  Pain management: adequate  Airway patency: patent  Anesthetic complications: No anesthetic complications    Cardiovascular status: acceptable  Respiratory status: acceptable  Hydration status: acceptable

## 2019-11-21 NOTE — ANESTHESIA PREPROCEDURE EVALUATION
Anesthesia Evaluation     Patient summary reviewed and Nursing notes reviewed   no history of anesthetic complications:  NPO Solid Status: > 8 hours  NPO Liquid Status: > 8 hours           Airway   Mallampati: II  TM distance: >3 FB  Neck ROM: full  Possible difficult intubation  Comment: His neck has decreased extension as well as decreased ability to turn to left or right  Has a beard  Dental - normal exam   (+) poor dentation        Pulmonary - negative pulmonary ROS and normal exam    breath sounds clear to auscultation  (-) COPD, asthma, sleep apnea, not a smoker    ROS comment: snores  PE comment: AICD is in left chest wall  Cardiovascular - normal exam  Exercise tolerance: good (4-7 METS)    ECG reviewed  Patient on routine beta blocker and Beta blocker given within 24 hours of surgery  Rhythm: regular  Rate: normal    (+) pacemaker ( originally inserted in 2010 and replaced 7/2016. Has not been discharged since placement.) ICD, pacemaker, hypertension well controlled, past MI ( he states he had a silent MI 30 years ago and had clean coronary arteries in a 2010 catheterization.)  >12 months, dysrhythmias ( currently in NSR, controlled with metoprolol and multaq) Atrial Fib, DVT (possible DVT on surgical side),   (-) angina, murmur, cardiac stents    ROS comment: History cardiac defibrillator    Normal sinus rhythm  Normal ECG  When compared with ECG of 07-MAR-2018 18:04,  No significant change was found  [facility]    Neuro/Psych  (+) numbness (estevan feet neuropathy),     (-) seizures, TIA, CVA, headaches, psychiatric history  GI/Hepatic/Renal/Endo    (+) obesity,   diabetes mellitus (glu 92) type 2 well controlled,   (-) hepatitis, liver disease, no renal diseaseGERD:  he is on protonix= ordered pre-op, but he denies having GERD.    Musculoskeletal     (+) arthralgias,       ROS comment: Charcot's foot  Abdominal   (+) obese,    Substance History - negative use     OB/GYN negative ob/gyn ROS          Other   arthritis,                        Anesthesia Plan    ASA 3     general   ([place] available)  intravenous induction     Anesthetic plan, all risks, benefits, and alternatives have been provided, discussed and informed consent has been obtained with: patient and spouse/significant other.

## 2019-11-22 LAB — GLUCOSE BLDC GLUCOMTR-MCNC: 80 MG/DL (ref 70–130)

## 2019-11-25 ENCOUNTER — OFFICE VISIT (OUTPATIENT)
Dept: PODIATRY | Facility: CLINIC | Age: 59
End: 2019-11-25

## 2019-11-25 VITALS — WEIGHT: 315 LBS | OXYGEN SATURATION: 98 % | HEART RATE: 78 BPM | HEIGHT: 76 IN | BODY MASS INDEX: 38.36 KG/M2

## 2019-11-25 DIAGNOSIS — M14.672 CHARCOT'S JOINT OF LEFT FOOT: ICD-10-CM

## 2019-11-25 DIAGNOSIS — L97.422 DIABETIC ULCER OF LEFT MIDFOOT ASSOCIATED WITH TYPE 2 DIABETES MELLITUS, WITH FAT LAYER EXPOSED (HCC): Primary | ICD-10-CM

## 2019-11-25 DIAGNOSIS — E11.621 DIABETIC ULCER OF LEFT MIDFOOT ASSOCIATED WITH TYPE 2 DIABETES MELLITUS, WITH FAT LAYER EXPOSED (HCC): Primary | ICD-10-CM

## 2019-11-25 PROCEDURE — 99024 POSTOP FOLLOW-UP VISIT: CPT | Performed by: PODIATRIST

## 2019-11-25 NOTE — PROGRESS NOTES
Emre Lisa  1960  59 y.o. male   [place] - 6/13/2019  BS -111 per patient    Patient presents today for a recheck of his left foot ulcer.     11/25/2019     Chief Complaint   Patient presents with   • Left Foot - Follow-up, Foot Ulcer       History of Present Illness    Patient presents to clinic today for his first postoperative visit.  Patient had plantar planing and debridement of left foot ulcer on November 21.  His dressing is clean, dry and intact.      Past Medical History:   Diagnosis Date   • Arthritis    • Atrial fibrillation (CMS/HCC)    • Diabetes mellitus (CMS/HCC)    • Diabetic foot ulcer associated with type 2 diabetes mellitus (CMS/HCC)     left great toe   • Hallux malleus of left foot    • Hammer toe    • Hypertension    • MI (myocardial infarction) (CMS/HCC)    • Neuropathy in diabetes (CMS/HCC)    • Onychomycosis    • Presence of biventricular cardiac pacemaker          Past Surgical History:   Procedure Laterality Date   • AMPUTATION DIGIT Right 3/5/2017    Procedure: RIGHT AMPUTATION TRANSMETATRASAL , RECESSION GASTROCNEMOUS;  Surgeon: Marco A Thompson DPM;  Location: [facility];  Service:    • CARDIAC DEFIBRILLATOR PLACEMENT  2010, 2016   • CARPAL TUNNEL RELEASE  2015    elbow and wrist left arm   • FOOT IRRIGATION, DEBRIDEMENT AND REPAIR Left 3/7/2018    Procedure: FOOT IRRIGATION, DEBRIDEMENT AND REPAIR;  Surgeon: Marco A Thompson DPM;  Location: [facility];  Service:    • FOOT IRRIGATION, DEBRIDEMENT AND REPAIR Left 3/10/2018    Procedure: FOOT IRRIGATION, DEBRIDEMENT AND REPAIR;  Surgeon: Marco A Thompson DPM;  Location: [facility] OR;  Service: Podiatry   • FOOT WOUND CLOSURE Right 3/2/2017    Procedure: INCISION AND DRAINAGE, RIGHT FOOT;  Surgeon: Tung Rosenthal DPM;  Location: [facility];  Service:    • HAMMER TOE REPAIR Left 2/15/2018    Procedure: HAMMERTOE CORRECTION LEFT SECOND, THIRD AND FOURTH TOES AND HALLUX INTERPHALANGEAL JOINT ARTHRODESIS LEFT FOOT (Micro Asnis, 4.0 &  5.0 Asnis)      (c-arm);  Surgeon: Marco A Thompson DPM;  Location: [facility] OR;  Service:    • HARDWARE REMOVAL Left 3/5/2018    Procedure: ANKLE/FOOT HARDWARE REMOVAL;  Surgeon: Marco A Thompson DPM;  Location: [facility] OR;  Service:    • INCISION AND DRAINAGE LEG Left 3/5/2018    Procedure: INCISION AND DRAINAGE LOWER EXTREMITY;  Surgeon: Marco A Thompson DPM;  Location: [facility] OR;  Service:    • PLANTAR FASCIA RELEASE Left 11/21/2019    Procedure: PLANTAR PLANING LEFT FOOT AND ALL OTHER INDICATED PROCEDURES;  Surgeon: Marco A Thompson DPM;  Location: [facility] OR;  Service: Podiatry   • TOE AMPUTATION Right 2016   • TONSILECTOMY, ADENOIDECTOMY, BILATERAL MYRINGOTOMY AND TUBES      age 25         Family History   Problem Relation Age of Onset   • Diabetes Father    • Stroke Father    • No Known Problems Maternal Grandmother    • No Known Problems Maternal Grandfather    • No Known Problems Paternal Grandmother    • No Known Problems Paternal Grandfather    • No Known Problems Son    • No Known Problems Daughter    • No Known Problems Daughter          Social History     Socioeconomic History   • Marital status:      Spouse name: Not on file   • Number of children: Not on file   • Years of education: Not on file   • Highest education level: Not on file   Tobacco Use   • Smoking status: Never Smoker   • Smokeless tobacco: Never Used   Substance and Sexual Activity   • Alcohol use: No   • Drug use: No   • Sexual activity: Defer         Current Outpatient Medications   Medication Sig Dispense Refill   • aspirin 81 MG chewable tablet Chew 81 mg Every Night.     • Cholecalciferol (VITAMIN D3) 5000 UNITS capsule capsule Take 5,000 Units by mouth Every Night.     • dronedarone (MULTAQ) 400 MG tablet Take 400 mg by mouth Every Night.     • fluticasone (FLONASE) 50 MCG/ACT nasal spray 2 sprays into each nostril As Needed for Rhinitis.     • glimepiride (AMARYL) 2 MG tablet Take 4 mg by mouth 2 (Two) Times a Day.     •  "losartan (COZAAR) 25 MG tablet Take 25 mg by mouth Every Night.     • magnesium oxide (MAGOX) 400 (241.3 Mg) MG tablet tablet Take 400 mg by mouth 2 (Two) Times a Day.     • metFORMIN (GLUCOPHAGE) 1000 MG tablet Take 1,000 mg by mouth 2 (Two) Times a Day With Meals.     • metoprolol succinate XL (TOPROL-XL) 25 MG 24 hr tablet Take 25 mg by mouth Every Night. 0.5 tablet     • ONE TOUCH ULTRA TEST test strip USE TO TEST BLOOD SUGAR THREE TIMES A DAY  1   • vitamin C (ASCORBIC ACID) 500 MG tablet Take 500 mg by mouth Every Night.     • vitamin E 200 UNIT capsule Take 200 Units by mouth Every Night.       No current facility-administered medications for this visit.            OBJECTIVE    Pulse 78   Ht 193 cm (76\")   Wt (!) 143 kg (315 lb)   SpO2 98%   BMI 38.34 kg/m²     Review of Systems   Constitutional: Negative.    HENT: Negative.    Eyes: Negative.    Respiratory: Negative.    Cardiovascular: Negative.    Gastrointestinal: Negative.    Skin: Positive for wound.   Psychiatric/Behavioral: Negative.              Physical Exam   Constitutional: He is oriented to person, place, and time. He appears well-developed and well-nourished. No distress.   HENT:   Head: Normocephalic and atraumatic.   Nose: Nose normal.   Eyes: Conjunctivae and EOM are normal. Pupils are equal, round, and reactive to light.   Pulmonary/Chest: Effort normal. No respiratory distress.   Musculoskeletal: Normal range of motion. He exhibits no tenderness.   Neurological: He is alert and oriented to person, place, and time.   Psychiatric: He has a normal mood and affect. His behavior is normal. Judgment normal.   Vitals reviewed.    Left Lower Extremity: Incision site is well approximated with no signs of dehiscence noted.  Minimal edema.  No erythema or ecchymosis.  CFT immediate to distal digits.  Full-thickness ulcer noted to plantar foot.  Base is 100% granular.        Procedures        ASSESSMENT AND PLAN    Emre was seen today for " follow-up and foot ulcer.    Diagnoses and all orders for this visit:    Diabetic ulcer of left midfoot associated with type 2 diabetes mellitus, with fat layer exposed (CMS/HCC)    Charcot's joint of left foot      -Patien is doing well postoperatively.  Dressing change performed.  Splint reapplied.  Keep splint and dressing clean, dry and intact. nonweightbearing to left lower extremity.  -Recheck 1 week          This document has been electronically signed by Marco A Thompson DPM on November 25, 2019 8:23 AM

## 2019-12-02 ENCOUNTER — OFFICE VISIT (OUTPATIENT)
Dept: PODIATRY | Facility: CLINIC | Age: 59
End: 2019-12-02

## 2019-12-02 VITALS — WEIGHT: 315 LBS | HEART RATE: 111 BPM | OXYGEN SATURATION: 98 % | HEIGHT: 76 IN | BODY MASS INDEX: 38.36 KG/M2

## 2019-12-02 DIAGNOSIS — E11.621 DIABETIC ULCER OF LEFT MIDFOOT ASSOCIATED WITH TYPE 2 DIABETES MELLITUS, WITH FAT LAYER EXPOSED (HCC): Primary | ICD-10-CM

## 2019-12-02 DIAGNOSIS — L97.422 DIABETIC ULCER OF LEFT MIDFOOT ASSOCIATED WITH TYPE 2 DIABETES MELLITUS, WITH FAT LAYER EXPOSED (HCC): Primary | ICD-10-CM

## 2019-12-02 DIAGNOSIS — M14.672 CHARCOT'S JOINT OF LEFT FOOT: ICD-10-CM

## 2019-12-02 PROCEDURE — 29405 APPL SHORT LEG CAST: CPT | Performed by: PODIATRIST

## 2019-12-02 PROCEDURE — 99024 POSTOP FOLLOW-UP VISIT: CPT | Performed by: PODIATRIST

## 2019-12-02 RX ORDER — CLINDAMYCIN HYDROCHLORIDE 300 MG/1
600 CAPSULE ORAL 3 TIMES DAILY
Qty: 60 CAPSULE | Refills: 0 | Status: SHIPPED | OUTPATIENT
Start: 2019-12-02 | End: 2020-01-21

## 2019-12-02 NOTE — PROGRESS NOTES
Emre Lisa  1960  59 y.o. male   [place] - 6/13/2019  BS - 96 per patient    Patient presents today for a post op recheck of his left foot.    12/02/2019     Chief Complaint   Patient presents with   • Left Foot - post op recheck       History of Present Illness    Patient presents to clinic today for his second postoperative visit.  Patient had plantar planing and debridement of left foot ulcer on November 21.  His dressing is clean, dry and intact.  States that he had a fall on Saturday and caught himself with his left foot.  There is no associated pain.      Past Medical History:   Diagnosis Date   • Arthritis    • Atrial fibrillation (CMS/HCC)    • Diabetes mellitus (CMS/[facility])    • Diabetic foot ulcer associated with type 2 diabetes mellitus (CMS/HCC)     left great toe   • Hallux malleus of left foot    • Hammer toe    • Hypertension    • MI (myocardial infarction) (CMS/[facility])    • Neuropathy in diabetes (CMS/[facility])    • Onychomycosis    • Presence of biventricular cardiac pacemaker          Past Surgical History:   Procedure Laterality Date   • AMPUTATION DIGIT Right 3/5/2017    Procedure: RIGHT AMPUTATION TRANSMETATRASAL , RECESSION GASTROCNEMOUS;  Surgeon: Marco A Thompson DPM;  Location: [facility];  Service:    • CARDIAC DEFIBRILLATOR PLACEMENT  2010, 2016   • CARPAL TUNNEL RELEASE  2015    elbow and wrist left arm   • FOOT IRRIGATION, DEBRIDEMENT AND REPAIR Left 3/7/2018    Procedure: FOOT IRRIGATION, DEBRIDEMENT AND REPAIR;  Surgeon: Marco A Thompson DPM;  Location: [facility];  Service:    • FOOT IRRIGATION, DEBRIDEMENT AND REPAIR Left 3/10/2018    Procedure: FOOT IRRIGATION, DEBRIDEMENT AND REPAIR;  Surgeon: Marco A Thompson DPM;  Location: [facility] OR;  Service: Podiatry   • FOOT WOUND CLOSURE Right 3/2/2017    Procedure: INCISION AND DRAINAGE, RIGHT FOOT;  Surgeon: Tung Rosenthal DPM;  Location: [facility] OR;  Service:    • HAMMER TOE REPAIR Left 2/15/2018    Procedure: HAMMERTOE CORRECTION LEFT  SECOND, THIRD AND FOURTH TOES AND HALLUX INTERPHALANGEAL JOINT ARTHRODESIS LEFT FOOT (Micro Asnis, 4.0 & 5.0 Asnis)      (c-arm);  Surgeon: Marco A Thompson DPM;  Location: [facility];  Service:    • HARDWARE REMOVAL Left 3/5/2018    Procedure: ANKLE/FOOT HARDWARE REMOVAL;  Surgeon: Marco A Thompson DPM;  Location: [facility] OR;  Service:    • INCISION AND DRAINAGE LEG Left 3/5/2018    Procedure: INCISION AND DRAINAGE LOWER EXTREMITY;  Surgeon: Marco A Thompson DPM;  Location: [facility] OR;  Service:    • PLANTAR FASCIA RELEASE Left 11/21/2019    Procedure: PLANTAR PLANING LEFT FOOT AND ALL OTHER INDICATED PROCEDURES;  Surgeon: Marco A Thompson DPM;  Location: [facility];  Service: Podiatry   • TOE AMPUTATION Right 2016   • TONSILECTOMY, ADENOIDECTOMY, BILATERAL MYRINGOTOMY AND TUBES      age 25         Family History   Problem Relation Age of Onset   • Diabetes Father    • Stroke Father    • No Known Problems Maternal Grandmother    • No Known Problems Maternal Grandfather    • No Known Problems Paternal Grandmother    • No Known Problems Paternal Grandfather    • No Known Problems Son    • No Known Problems Daughter    • No Known Problems Daughter          Social History     Socioeconomic History   • Marital status:      Spouse name: Not on file   • Number of children: Not on file   • Years of education: Not on file   • Highest education level: Not on file   Tobacco Use   • Smoking status: Never Smoker   • Smokeless tobacco: Never Used   Substance and Sexual Activity   • Alcohol use: No   • Drug use: No   • Sexual activity: Defer         Current Outpatient Medications   Medication Sig Dispense Refill   • aspirin 81 MG chewable tablet Chew 81 mg Every Night.     • Cholecalciferol (VITAMIN D3) 5000 UNITS capsule capsule Take 5,000 Units by mouth Every Night.     • dronedarone (MULTAQ) 400 MG tablet Take 400 mg by mouth Every Night.     • fluticasone (FLONASE) 50 MCG/ACT nasal spray 2 sprays into each nostril As Needed  "for Rhinitis.     • glimepiride (AMARYL) 2 MG tablet Take 4 mg by mouth 2 (Two) Times a Day.     • losartan (COZAAR) 25 MG tablet Take 25 mg by mouth Every Night.     • magnesium oxide (MAGOX) 400 (241.3 Mg) MG tablet tablet Take 400 mg by mouth 2 (Two) Times a Day.     • metFORMIN (GLUCOPHAGE) 1000 MG tablet Take 1,000 mg by mouth 2 (Two) Times a Day With Meals.     • metoprolol succinate XL (TOPROL-XL) 25 MG 24 hr tablet Take 25 mg by mouth Every Night. 0.5 tablet     • ONE TOUCH ULTRA TEST test strip USE TO TEST BLOOD SUGAR THREE TIMES A DAY  1   • vitamin C (ASCORBIC ACID) 500 MG tablet Take 500 mg by mouth Every Night.     • vitamin E 200 UNIT capsule Take 200 Units by mouth Every Night.     • clindamycin (CLEOCIN) 300 MG capsule Take 2 capsules by mouth 3 (Three) Times a Day. 60 capsule 0     No current facility-administered medications for this visit.            OBJECTIVE    Pulse 111   Ht 193 cm (76\")   Wt (!) 143 kg (315 lb)   SpO2 98%   BMI 38.34 kg/m²     Review of Systems   Constitutional: Negative.    HENT: Negative.    Eyes: Negative.    Respiratory: Negative.    Cardiovascular: Negative.    Gastrointestinal: Negative.    Musculoskeletal: Negative.    Skin: Positive for wound.   Psychiatric/Behavioral: Negative.              Physical Exam   Constitutional: He is oriented to person, place, and time. He appears well-developed and well-nourished. No distress.   HENT:   Head: Normocephalic and atraumatic.   Eyes: EOM are normal. Pupils are equal, round, and reactive to light.   Pulmonary/Chest: Effort normal. No respiratory distress.   Neurological: He is alert and oriented to person, place, and time.   Psychiatric: He has a normal mood and affect. His behavior is normal. Judgment normal.   Vitals reviewed.    Left Lower Extremity: Incision site is well approximated with no signs of dehiscence noted.  Moderate edema with mild erythema.  CFT immediate to distal digits.  Full-thickness ulcer noted to " plantar foot.  Base is 100% granular.        Procedures        ASSESSMENT AND PLAN    Emre was seen today for post op recheck.    Diagnoses and all orders for this visit:    Diabetic ulcer of left midfoot associated with type 2 diabetes mellitus, with fat layer exposed (CMS/HCC)    Charcot's joint of left foot    Other orders  -     clindamycin (CLEOCIN) 300 MG capsule; Take 2 capsules by mouth 3 (Three) Times a Day.      -Patien is doing well postoperatively.  Dressing change performed.  Applied nonweightbearing short leg fiberglass cast.  Nonweightbearing to left lower extremity.  -Rx for clindamycin  -Recheck 1 week          This document has been electronically signed by Marco A Thompson DPM on December 2, 2019 8:32 AM

## 2019-12-09 ENCOUNTER — OFFICE VISIT (OUTPATIENT)
Dept: PODIATRY | Facility: CLINIC | Age: 59
End: 2019-12-09

## 2019-12-09 VITALS — OXYGEN SATURATION: 98 % | HEART RATE: 111 BPM | HEIGHT: 76 IN | BODY MASS INDEX: 38.36 KG/M2 | WEIGHT: 315 LBS

## 2019-12-09 DIAGNOSIS — M14.672 CHARCOT'S JOINT OF LEFT FOOT: ICD-10-CM

## 2019-12-09 DIAGNOSIS — E11.621 DIABETIC ULCER OF LEFT MIDFOOT ASSOCIATED WITH TYPE 2 DIABETES MELLITUS, WITH FAT LAYER EXPOSED (HCC): Primary | ICD-10-CM

## 2019-12-09 DIAGNOSIS — E11.42 TYPE 2 DIABETES MELLITUS WITH PERIPHERAL NEUROPATHY (HCC): ICD-10-CM

## 2019-12-09 DIAGNOSIS — L97.422 DIABETIC ULCER OF LEFT MIDFOOT ASSOCIATED WITH TYPE 2 DIABETES MELLITUS, WITH FAT LAYER EXPOSED (HCC): Primary | ICD-10-CM

## 2019-12-09 PROCEDURE — 99024 POSTOP FOLLOW-UP VISIT: CPT | Performed by: PODIATRIST

## 2019-12-09 NOTE — PROGRESS NOTES
Emre Lisa  1960  59 y.o. male   [place] - 6/13/2019  BS - 97 per patient    Patient presents today for a post op recheck of his left foot.    12/09/2019     Chief Complaint   Patient presents with   • Left Foot - post op recheck, cast change, Wound Check       History of Present Illness    Patient presents to clinic today for his third postoperative visit.  Patient had plantar planing and debridement of left foot ulcer on November 21.  His dressing cast is clean, dry and intact.  He is nonweightbearing with a knee scooter.  States he is taking his antibiotics as instructed.    Past Medical History:   Diagnosis Date   • Arthritis    • Atrial fibrillation (CMS/[facility])    • Diabetes mellitus (CMS/[facility])    • Diabetic foot ulcer associated with type 2 diabetes mellitus (CMS/HCC)     left great toe   • Hallux malleus of left foot    • Hammer toe    • Hypertension    • MI (myocardial infarction) (CMS/[facility])    • Neuropathy in diabetes (CMS/[facility])    • Onychomycosis    • Presence of biventricular cardiac pacemaker          Past Surgical History:   Procedure Laterality Date   • AMPUTATION DIGIT Right 3/5/2017    Procedure: RIGHT AMPUTATION TRANSMETATRASAL , RECESSION GASTROCNEMOUS;  Surgeon: Marco A Thompson DPM;  Location: [facility];  Service:    • CARDIAC DEFIBRILLATOR PLACEMENT  2010, 2016   • CARPAL TUNNEL RELEASE  2015    elbow and wrist left arm   • FOOT IRRIGATION, DEBRIDEMENT AND REPAIR Left 3/7/2018    Procedure: FOOT IRRIGATION, DEBRIDEMENT AND REPAIR;  Surgeon: Marco A Thompson DPM;  Location: [facility];  Service:    • FOOT IRRIGATION, DEBRIDEMENT AND REPAIR Left 3/10/2018    Procedure: FOOT IRRIGATION, DEBRIDEMENT AND REPAIR;  Surgeon: Marco A Thompson DPM;  Location: [facility] OR;  Service: Podiatry   • FOOT WOUND CLOSURE Right 3/2/2017    Procedure: INCISION AND DRAINAGE, RIGHT FOOT;  Surgeon: Tung Rosenthal DPM;  Location: [facility] OR;  Service:    • HAMMER TOE REPAIR Left 2/15/2018    Procedure: JULIAN  CORRECTION LEFT SECOND, THIRD AND FOURTH TOES AND HALLUX INTERPHALANGEAL JOINT ARTHRODESIS LEFT FOOT (Micro Asnis, 4.0 & 5.0 Asnis)      (c-arm);  Surgeon: Marco A Thompson DPM;  Location: [facility];  Service:    • HARDWARE REMOVAL Left 3/5/2018    Procedure: ANKLE/FOOT HARDWARE REMOVAL;  Surgeon: Marco A Thompson DPM;  Location: [facility] OR;  Service:    • INCISION AND DRAINAGE LEG Left 3/5/2018    Procedure: INCISION AND DRAINAGE LOWER EXTREMITY;  Surgeon: Marco A Thompson DPM;  Location: [facility] OR;  Service:    • PLANTAR FASCIA RELEASE Left 11/21/2019    Procedure: PLANTAR PLANING LEFT FOOT AND ALL OTHER INDICATED PROCEDURES;  Surgeon: Marco A Thompson DPM;  Location: [facility];  Service: Podiatry   • TOE AMPUTATION Right 2016   • TONSILECTOMY, ADENOIDECTOMY, BILATERAL MYRINGOTOMY AND TUBES      age 25         Family History   Problem Relation Age of Onset   • Diabetes Father    • Stroke Father    • No Known Problems Maternal Grandmother    • No Known Problems Maternal Grandfather    • No Known Problems Paternal Grandmother    • No Known Problems Paternal Grandfather    • No Known Problems Son    • No Known Problems Daughter    • No Known Problems Daughter          Social History     Socioeconomic History   • Marital status:      Spouse name: Not on file   • Number of children: Not on file   • Years of education: Not on file   • Highest education level: Not on file   Tobacco Use   • Smoking status: Never Smoker   • Smokeless tobacco: Never Used   Substance and Sexual Activity   • Alcohol use: No   • Drug use: No   • Sexual activity: Defer         Current Outpatient Medications   Medication Sig Dispense Refill   • aspirin 81 MG chewable tablet Chew 81 mg Every Night.     • Cholecalciferol (VITAMIN D3) 5000 UNITS capsule capsule Take 5,000 Units by mouth Every Night.     • clindamycin (CLEOCIN) 300 MG capsule Take 2 capsules by mouth 3 (Three) Times a Day. 60 capsule 0   • dronedarone (MULTAQ) 400 MG tablet Take  "400 mg by mouth Every Night.     • fluticasone (FLONASE) 50 MCG/ACT nasal spray 2 sprays into each nostril As Needed for Rhinitis.     • glimepiride (AMARYL) 2 MG tablet Take 4 mg by mouth 2 (Two) Times a Day.     • losartan (COZAAR) 25 MG tablet Take 25 mg by mouth Every Night.     • magnesium oxide (MAGOX) 400 (241.3 Mg) MG tablet tablet Take 400 mg by mouth 2 (Two) Times a Day.     • metFORMIN (GLUCOPHAGE) 1000 MG tablet Take 1,000 mg by mouth 2 (Two) Times a Day With Meals.     • metoprolol succinate XL (TOPROL-XL) 25 MG 24 hr tablet Take 25 mg by mouth Every Night. 0.5 tablet     • ONE TOUCH ULTRA TEST test strip USE TO TEST BLOOD SUGAR THREE TIMES A DAY  1   • vitamin C (ASCORBIC ACID) 500 MG tablet Take 500 mg by mouth Every Night.     • vitamin E 200 UNIT capsule Take 200 Units by mouth Every Night.       No current facility-administered medications for this visit.            OBJECTIVE    Pulse 111   Ht 193 cm (76\")   Wt (!) 143 kg (315 lb)   SpO2 98%   BMI 38.34 kg/m²     Review of Systems   Constitutional: Negative.    HENT: Negative.    Eyes: Negative.    Respiratory: Negative.    Cardiovascular: Negative.    Gastrointestinal: Negative.    Musculoskeletal: Negative.    Skin: Positive for wound.   Psychiatric/Behavioral: Negative.              Physical Exam   Constitutional: He is oriented to person, place, and time. He appears well-developed and well-nourished. No distress.   HENT:   Head: Normocephalic and atraumatic.   Nose: Nose normal.   Eyes: Pupils are equal, round, and reactive to light. EOM are normal.   Pulmonary/Chest: Effort normal. No respiratory distress.   Neurological: He is alert and oriented to person, place, and time.   Psychiatric: He has a normal mood and affect. His behavior is normal. Judgment normal.   Vitals reviewed.    Left Lower Extremity: Incision site is well approximated with no signs of dehiscence noted.  Moderate edema with improving erythema.  CFT immediate to distal " digits.  Full-thickness ulcer noted to plantar foot.  Base is 100% granular.  Negative Homans        Procedures        ASSESSMENT AND PLAN    Emre was seen today for post op recheck, cast change and wound check.    Diagnoses and all orders for this visit:    Diabetic ulcer of left midfoot associated with type 2 diabetes mellitus, with fat layer exposed (CMS/HCC)    Charcot's joint of left foot    Type 2 diabetes mellitus with peripheral neuropathy (CMS/HCC)      -Patien is doing well postoperatively.  Erythema to left foot has decreased.  Continue antibiotics until course is complete.  Sutures removed.  Increased foot swelling.  Unna boot applied for edema control.  Nonweightbearing to left lower extremity.  -Recheck 1 week          This document has been electronically signed by Marco A Thompson DPM on December 9, 2019 8:26 AM

## 2019-12-16 ENCOUNTER — OFFICE VISIT (OUTPATIENT)
Dept: PODIATRY | Facility: CLINIC | Age: 59
End: 2019-12-16

## 2019-12-16 VITALS — BODY MASS INDEX: 38.36 KG/M2 | OXYGEN SATURATION: 98 % | HEIGHT: 76 IN | WEIGHT: 315 LBS | HEART RATE: 120 BPM

## 2019-12-16 DIAGNOSIS — E11.621 DIABETIC ULCER OF LEFT MIDFOOT ASSOCIATED WITH TYPE 2 DIABETES MELLITUS, WITH FAT LAYER EXPOSED (HCC): Primary | ICD-10-CM

## 2019-12-16 DIAGNOSIS — L97.422 DIABETIC ULCER OF LEFT MIDFOOT ASSOCIATED WITH TYPE 2 DIABETES MELLITUS, WITH FAT LAYER EXPOSED (HCC): Primary | ICD-10-CM

## 2019-12-16 DIAGNOSIS — M14.672 CHARCOT'S JOINT OF LEFT FOOT: ICD-10-CM

## 2019-12-16 PROCEDURE — 99024 POSTOP FOLLOW-UP VISIT: CPT | Performed by: PODIATRIST

## 2019-12-16 NOTE — PROGRESS NOTES
Emre Lisa  1960  59 y.o. male   [place] - 6/13/2019  BS - 97 per patient    Patient presents today for a post op recheck of his left foot.    12/16/2019     Chief Complaint   Patient presents with   • Left Foot - post op recheck       History of Present Illness    Patient presents to clinic today for his fourth postoperative visit.  Patient had plantar planing and debridement of left foot ulcer on November 21.  His dressing cast is clean, dry and intact.  He is nonweightbearing with a knee scooter.       Past Medical History:   Diagnosis Date   • Arthritis    • Atrial fibrillation (CMS/HCC)    • Diabetes mellitus (CMS/[facility])    • Diabetic foot ulcer associated with type 2 diabetes mellitus (CMS/HCC)     left great toe   • Hallux malleus of left foot    • Hammer toe    • Hypertension    • MI (myocardial infarction) (CMS/[facility])    • Neuropathy in diabetes (CMS/[facility])    • Onychomycosis    • Presence of biventricular cardiac pacemaker          Past Surgical History:   Procedure Laterality Date   • AMPUTATION DIGIT Right 3/5/2017    Procedure: RIGHT AMPUTATION TRANSMETATRASAL , RECESSION GASTROCNEMOUS;  Surgeon: Marco A Thompson DPM;  Location: [facility];  Service:    • CARDIAC DEFIBRILLATOR PLACEMENT  2010, 2016   • CARPAL TUNNEL RELEASE  2015    elbow and wrist left arm   • FOOT IRRIGATION, DEBRIDEMENT AND REPAIR Left 3/7/2018    Procedure: FOOT IRRIGATION, DEBRIDEMENT AND REPAIR;  Surgeon: Marco A Thompson DPM;  Location: [facility];  Service:    • FOOT IRRIGATION, DEBRIDEMENT AND REPAIR Left 3/10/2018    Procedure: FOOT IRRIGATION, DEBRIDEMENT AND REPAIR;  Surgeon: Marco A Thompson DPM;  Location: [facility] OR;  Service: Podiatry   • FOOT WOUND CLOSURE Right 3/2/2017    Procedure: INCISION AND DRAINAGE, RIGHT FOOT;  Surgeon: Tung Rosenthal DPM;  Location: [facility];  Service:    • HAMMER TOE REPAIR Left 2/15/2018    Procedure: HAMMERTOE CORRECTION LEFT SECOND, THIRD AND FOURTH TOES AND HALLUX INTERPHALANGEAL  JOINT ARTHRODESIS LEFT FOOT (Micro Asnis, 4.0 & 5.0 Asnis)      (c-arm);  Surgeon: Marco A Thompson DPM;  Location: [facility];  Service:    • HARDWARE REMOVAL Left 3/5/2018    Procedure: ANKLE/FOOT HARDWARE REMOVAL;  Surgeon: Marco A Thompson DPM;  Location: [facility] OR;  Service:    • INCISION AND DRAINAGE LEG Left 3/5/2018    Procedure: INCISION AND DRAINAGE LOWER EXTREMITY;  Surgeon: Marco A Thompson DPM;  Location: [facility] OR;  Service:    • PLANTAR FASCIA RELEASE Left 11/21/2019    Procedure: PLANTAR PLANING LEFT FOOT AND ALL OTHER INDICATED PROCEDURES;  Surgeon: Marco A Thompson DPM;  Location: [facility] OR;  Service: Podiatry   • TOE AMPUTATION Right 2016   • TONSILECTOMY, ADENOIDECTOMY, BILATERAL MYRINGOTOMY AND TUBES      age 25         Family History   Problem Relation Age of Onset   • Diabetes Father    • Stroke Father    • No Known Problems Maternal Grandmother    • No Known Problems Maternal Grandfather    • No Known Problems Paternal Grandmother    • No Known Problems Paternal Grandfather    • No Known Problems Son    • No Known Problems Daughter    • No Known Problems Daughter          Social History     Socioeconomic History   • Marital status:      Spouse name: Not on file   • Number of children: Not on file   • Years of education: Not on file   • Highest education level: Not on file   Tobacco Use   • Smoking status: Never Smoker   • Smokeless tobacco: Never Used   Substance and Sexual Activity   • Alcohol use: No   • Drug use: No   • Sexual activity: Defer         Current Outpatient Medications   Medication Sig Dispense Refill   • aspirin 81 MG chewable tablet Chew 81 mg Every Night.     • Cholecalciferol (VITAMIN D3) 5000 UNITS capsule capsule Take 5,000 Units by mouth Every Night.     • clindamycin (CLEOCIN) 300 MG capsule Take 2 capsules by mouth 3 (Three) Times a Day. 60 capsule 0   • dronedarone (MULTAQ) 400 MG tablet Take 400 mg by mouth Every Night.     • fluticasone (FLONASE) 50 MCG/ACT nasal  "spray 2 sprays into each nostril As Needed for Rhinitis.     • glimepiride (AMARYL) 2 MG tablet Take 4 mg by mouth 2 (Two) Times a Day.     • losartan (COZAAR) 25 MG tablet Take 25 mg by mouth Every Night.     • magnesium oxide (MAGOX) 400 (241.3 Mg) MG tablet tablet Take 400 mg by mouth 2 (Two) Times a Day.     • metFORMIN (GLUCOPHAGE) 1000 MG tablet Take 1,000 mg by mouth 2 (Two) Times a Day With Meals.     • metoprolol succinate XL (TOPROL-XL) 25 MG 24 hr tablet Take 25 mg by mouth Every Night. 0.5 tablet     • ONE TOUCH ULTRA TEST test strip USE TO TEST BLOOD SUGAR THREE TIMES A DAY  1   • vitamin C (ASCORBIC ACID) 500 MG tablet Take 500 mg by mouth Every Night.     • vitamin E 200 UNIT capsule Take 200 Units by mouth Every Night.       No current facility-administered medications for this visit.            OBJECTIVE    Pulse 120   Ht 193 cm (76\")   Wt (!) 143 kg (315 lb)   SpO2 98%   BMI 38.34 kg/m²     Review of Systems   Constitutional: Negative.    HENT: Negative.    Eyes: Negative.    Respiratory: Negative.    Cardiovascular: Negative.    Gastrointestinal: Negative.    Musculoskeletal: Negative.    Skin: Positive for wound.   Psychiatric/Behavioral: Negative.              Physical Exam   Constitutional: He is oriented to person, place, and time. He appears well-developed and well-nourished. No distress.   HENT:   Head: Normocephalic and atraumatic.   Nose: Nose normal.   Eyes: Pupils are equal, round, and reactive to light. EOM are normal.   Pulmonary/Chest: Effort normal. No respiratory distress.   Neurological: He is alert and oriented to person, place, and time.   Psychiatric: He has a normal mood and affect. His behavior is normal. Judgment normal.   Vitals reviewed.    Left Lower Extremity: Incision site is healed.  Moderate edema with improving erythema.  CFT immediate to distal digits.  No open wounds noted.  Negative Homans        Procedures        ASSESSMENT AND PLAN    Emer was seen today for " post op recheck.    Diagnoses and all orders for this visit:    Diabetic ulcer of left midfoot associated with type 2 diabetes mellitus, with fat layer exposed (CMS/HCC)  -     XR Foot 3+ View Left    Charcot's joint of left foot  -     XR Foot 3+ View Left      -Patien is doing well postoperatively.  Unna boot re-applied for edema control.  Recommended patient continue nonweightbearing which patient refused.  Patient states that he has no longer able to comply with nonweightbearing due to a couple reasons 1 being he does not want to continue to rent a lift chair for his home.  Patient was placed into an offloaded cam boot today at his request against my recommendation.  Recommended patient only  partial weightbearing to transfer.  Patient walked out of the office today without knee scooter.  - radiographs taken and reviewed  - Recheck 1 week          This document has been electronically signed by Marco A Thompson DPM on December 16, 2019 7:50 PM

## 2019-12-23 ENCOUNTER — OFFICE VISIT (OUTPATIENT)
Dept: PODIATRY | Facility: CLINIC | Age: 59
End: 2019-12-23

## 2019-12-23 VITALS — WEIGHT: 315 LBS | BODY MASS INDEX: 38.36 KG/M2 | HEART RATE: 85 BPM | OXYGEN SATURATION: 99 % | HEIGHT: 76 IN

## 2019-12-23 DIAGNOSIS — L97.422 DIABETIC ULCER OF LEFT MIDFOOT ASSOCIATED WITH TYPE 2 DIABETES MELLITUS, WITH FAT LAYER EXPOSED (HCC): Primary | ICD-10-CM

## 2019-12-23 DIAGNOSIS — E11.621 DIABETIC ULCER OF LEFT MIDFOOT ASSOCIATED WITH TYPE 2 DIABETES MELLITUS, WITH FAT LAYER EXPOSED (HCC): Primary | ICD-10-CM

## 2019-12-23 DIAGNOSIS — M14.672 CHARCOT'S JOINT OF LEFT FOOT: ICD-10-CM

## 2019-12-23 DIAGNOSIS — E11.42 TYPE 2 DIABETES MELLITUS WITH PERIPHERAL NEUROPATHY (HCC): ICD-10-CM

## 2019-12-23 PROCEDURE — 99024 POSTOP FOLLOW-UP VISIT: CPT | Performed by: PODIATRIST

## 2019-12-23 NOTE — PROGRESS NOTES
Emre Lisa  1960  59 y.o. male   [place] - 6/13/2019  BS - 76 per patient    Patient presents today for a post op recheck of his left foot.    12/23/2019     Chief Complaint   Patient presents with   • Left Foot - Post-op       History of Present Illness    Patient presents to clinic today for his fifth postoperative visit.  Patient had plantar planing and debridement of left foot ulcer on November 21.  His dressing  is clean, dry and intact.  He is weightbearing in a cam boot.     Past Medical History:   Diagnosis Date   • Arthritis    • Atrial fibrillation (CMS/HCC)    • Diabetes mellitus (CMS/HCC)    • Diabetic foot ulcer associated with type 2 diabetes mellitus (CMS/HCC)     left great toe   • Hallux malleus of left foot    • Hammer toe    • Hypertension    • MI (myocardial infarction) (CMS/HCC)    • Neuropathy in diabetes (CMS/HCC)    • Onychomycosis    • Presence of biventricular cardiac pacemaker          Past Surgical History:   Procedure Laterality Date   • AMPUTATION DIGIT Right 3/5/2017    Procedure: RIGHT AMPUTATION TRANSMETATRASAL , RECESSION GASTROCNEMOUS;  Surgeon: Marco A Thompson DPM;  Location: [facility];  Service:    • CARDIAC DEFIBRILLATOR PLACEMENT  2010, 2016   • CARPAL TUNNEL RELEASE  2015    elbow and wrist left arm   • FOOT IRRIGATION, DEBRIDEMENT AND REPAIR Left 3/7/2018    Procedure: FOOT IRRIGATION, DEBRIDEMENT AND REPAIR;  Surgeon: Marco A Thompson DPM;  Location: [facility];  Service:    • FOOT IRRIGATION, DEBRIDEMENT AND REPAIR Left 3/10/2018    Procedure: FOOT IRRIGATION, DEBRIDEMENT AND REPAIR;  Surgeon: Marco A Thompson DPM;  Location: [facility] OR;  Service: Podiatry   • FOOT WOUND CLOSURE Right 3/2/2017    Procedure: INCISION AND DRAINAGE, RIGHT FOOT;  Surgeon: Tung Rosenthal DPM;  Location: [facility];  Service:    • HAMMER TOE REPAIR Left 2/15/2018    Procedure: HAMMERTOE CORRECTION LEFT SECOND, THIRD AND FOURTH TOES AND HALLUX INTERPHALANGEAL JOINT ARTHRODESIS LEFT  FOOT (Micro Asnis, 4.0 & 5.0 Asnis)      (c-arm);  Surgeon: Marco A Thompson DPM;  Location: [facility];  Service:    • HARDWARE REMOVAL Left 3/5/2018    Procedure: ANKLE/FOOT HARDWARE REMOVAL;  Surgeon: Marco A Thompson DPM;  Location: [facility] OR;  Service:    • INCISION AND DRAINAGE LEG Left 3/5/2018    Procedure: INCISION AND DRAINAGE LOWER EXTREMITY;  Surgeon: Marco A Thompson DPM;  Location: [facility] OR;  Service:    • PLANTAR FASCIA RELEASE Left 11/21/2019    Procedure: PLANTAR PLANING LEFT FOOT AND ALL OTHER INDICATED PROCEDURES;  Surgeon: Marco A Thompson DPM;  Location: [facility];  Service: Podiatry   • TOE AMPUTATION Right 2016   • TONSILECTOMY, ADENOIDECTOMY, BILATERAL MYRINGOTOMY AND TUBES      age 25         Family History   Problem Relation Age of Onset   • Diabetes Father    • Stroke Father    • No Known Problems Maternal Grandmother    • No Known Problems Maternal Grandfather    • No Known Problems Paternal Grandmother    • No Known Problems Paternal Grandfather    • No Known Problems Son    • No Known Problems Daughter    • No Known Problems Daughter          Social History     Socioeconomic History   • Marital status:      Spouse name: Not on file   • Number of children: Not on file   • Years of education: Not on file   • Highest education level: Not on file   Tobacco Use   • Smoking status: Never Smoker   • Smokeless tobacco: Never Used   Substance and Sexual Activity   • Alcohol use: No   • Drug use: No   • Sexual activity: Defer         Current Outpatient Medications   Medication Sig Dispense Refill   • aspirin 81 MG chewable tablet Chew 81 mg Every Night.     • Cholecalciferol (VITAMIN D3) 5000 UNITS capsule capsule Take 5,000 Units by mouth Every Night.     • clindamycin (CLEOCIN) 300 MG capsule Take 2 capsules by mouth 3 (Three) Times a Day. 60 capsule 0   • dronedarone (MULTAQ) 400 MG tablet Take 400 mg by mouth Every Night.     • fluticasone (FLONASE) 50 MCG/ACT nasal spray 2 sprays into  "each nostril As Needed for Rhinitis.     • glimepiride (AMARYL) 2 MG tablet Take 4 mg by mouth 2 (Two) Times a Day.     • losartan (COZAAR) 25 MG tablet Take 25 mg by mouth Every Night.     • magnesium oxide (MAGOX) 400 (241.3 Mg) MG tablet tablet Take 400 mg by mouth 2 (Two) Times a Day.     • metFORMIN (GLUCOPHAGE) 1000 MG tablet Take 1,000 mg by mouth 2 (Two) Times a Day With Meals.     • metoprolol succinate XL (TOPROL-XL) 25 MG 24 hr tablet Take 25 mg by mouth Every Night. 0.5 tablet     • ONE TOUCH ULTRA TEST test strip USE TO TEST BLOOD SUGAR THREE TIMES A DAY  1   • vitamin C (ASCORBIC ACID) 500 MG tablet Take 500 mg by mouth Every Night.     • vitamin E 200 UNIT capsule Take 200 Units by mouth Every Night.       No current facility-administered medications for this visit.            OBJECTIVE    Pulse 85   Ht 193 cm (76\")   Wt (!) 143 kg (315 lb)   SpO2 99%   BMI 38.34 kg/m²     Review of Systems   Constitutional: Negative.    HENT: Negative.    Eyes: Negative.    Respiratory: Negative.    Cardiovascular: Negative.    Gastrointestinal: Negative.    Musculoskeletal: Negative.    Psychiatric/Behavioral: Negative.              Physical Exam   Constitutional: He is oriented to person, place, and time. He appears well-developed and well-nourished. No distress.   HENT:   Head: Normocephalic and atraumatic.   Nose: Nose normal.   Eyes: Pupils are equal, round, and reactive to light. EOM are normal.   Pulmonary/Chest: Effort normal. No respiratory distress.   Neurological: He is alert and oriented to person, place, and time.   Psychiatric: He has a normal mood and affect. His behavior is normal. Judgment normal.   Vitals reviewed.    Left Lower Extremity: Incision site is healed.  Improving edema and erythema.  CFT immediate to distal digits.  No open wounds noted.  Negative Homans        Procedures        ASSESSMENT AND PLAN    Emre was seen today for post-op.    Diagnoses and all orders for this " visit:    Diabetic ulcer of left midfoot associated with type 2 diabetes mellitus, with fat layer exposed (CMS/HCC)    Charcot's joint of left foot    Type 2 diabetes mellitus with peripheral neuropathy (CMS/HCC)      -Patien is doing well postoperatively   -Continue weightbearing in cam boot  - Recheck 2 weeks          This document has been electronically signed by Marco A Thompson DPM on December 23, 2019 12:02 PM

## 2019-12-30 ENCOUNTER — OFFICE VISIT (OUTPATIENT)
Dept: PODIATRY | Facility: CLINIC | Age: 59
End: 2019-12-30

## 2019-12-30 VITALS — WEIGHT: 315 LBS | HEIGHT: 76 IN | HEART RATE: 99 BPM | OXYGEN SATURATION: 99 % | BODY MASS INDEX: 38.36 KG/M2

## 2019-12-30 DIAGNOSIS — M14.672 CHARCOT'S JOINT OF LEFT FOOT: ICD-10-CM

## 2019-12-30 DIAGNOSIS — E11.42 TYPE 2 DIABETES MELLITUS WITH PERIPHERAL NEUROPATHY (HCC): ICD-10-CM

## 2019-12-30 DIAGNOSIS — L97.422 DIABETIC ULCER OF LEFT MIDFOOT ASSOCIATED WITH TYPE 2 DIABETES MELLITUS, WITH FAT LAYER EXPOSED (HCC): Primary | ICD-10-CM

## 2019-12-30 DIAGNOSIS — E11.621 DIABETIC ULCER OF LEFT MIDFOOT ASSOCIATED WITH TYPE 2 DIABETES MELLITUS, WITH FAT LAYER EXPOSED (HCC): Primary | ICD-10-CM

## 2019-12-30 PROCEDURE — 99024 POSTOP FOLLOW-UP VISIT: CPT | Performed by: PODIATRIST

## 2019-12-30 NOTE — PROGRESS NOTES
Emre Lisa  1960  59 y.o. male   [place] - 6/13/2019  BS - 79 per patient    Patient presents today for a post op recheck of his left foot.    12/30/2019     Chief Complaint   Patient presents with   • Left Foot - Follow-up       History of Present Illness    Patient presents to clinic today for his postoperative visit.  Patient had plantar planing and debridement of left foot ulcer on November 21. He is weightbearing in a cam boot.     Past Medical History:   Diagnosis Date   • Arthritis    • Atrial fibrillation (CMS/HCC)    • Diabetes mellitus (CMS/HCC)    • Diabetic foot ulcer associated with type 2 diabetes mellitus (CMS/HCC)     left great toe   • Hallux malleus of left foot    • Hammer toe    • Hypertension    • MI (myocardial infarction) (CMS/HCC)    • Neuropathy in diabetes (CMS/HCC)    • Onychomycosis    • Presence of biventricular cardiac pacemaker          Past Surgical History:   Procedure Laterality Date   • AMPUTATION DIGIT Right 3/5/2017    Procedure: RIGHT AMPUTATION TRANSMETATRASAL , RECESSION GASTROCNEMOUS;  Surgeon: Marco A Thompson DPM;  Location: [facility];  Service:    • CARDIAC DEFIBRILLATOR PLACEMENT  2010, 2016   • CARPAL TUNNEL RELEASE  2015    elbow and wrist left arm   • FOOT IRRIGATION, DEBRIDEMENT AND REPAIR Left 3/7/2018    Procedure: FOOT IRRIGATION, DEBRIDEMENT AND REPAIR;  Surgeon: Marco A Thompson DPM;  Location: [facility];  Service:    • FOOT IRRIGATION, DEBRIDEMENT AND REPAIR Left 3/10/2018    Procedure: FOOT IRRIGATION, DEBRIDEMENT AND REPAIR;  Surgeon: Marco A Thompson DPM;  Location: [facility] OR;  Service: Podiatry   • FOOT WOUND CLOSURE Right 3/2/2017    Procedure: INCISION AND DRAINAGE, RIGHT FOOT;  Surgeon: Tung Rosenthal DPM;  Location: [facility];  Service:    • HAMMER TOE REPAIR Left 2/15/2018    Procedure: HAMMERTOE CORRECTION LEFT SECOND, THIRD AND FOURTH TOES AND HALLUX INTERPHALANGEAL JOINT ARTHRODESIS LEFT FOOT (Micro Asnis, 4.0 & 5.0 Asnis)      (c-arm);   Surgeon: Marco A Thompson DPM;  Location: [facility] OR;  Service:    • HARDWARE REMOVAL Left 3/5/2018    Procedure: ANKLE/FOOT HARDWARE REMOVAL;  Surgeon: Marco A Thompson DPM;  Location: [facility] OR;  Service:    • INCISION AND DRAINAGE LEG Left 3/5/2018    Procedure: INCISION AND DRAINAGE LOWER EXTREMITY;  Surgeon: Marco A Thompson DPM;  Location: [facility] OR;  Service:    • PLANTAR FASCIA RELEASE Left 11/21/2019    Procedure: PLANTAR PLANING LEFT FOOT AND ALL OTHER INDICATED PROCEDURES;  Surgeon: Marco A Thompson DPM;  Location: [facility] OR;  Service: Podiatry   • TOE AMPUTATION Right 2016   • TONSILECTOMY, ADENOIDECTOMY, BILATERAL MYRINGOTOMY AND TUBES      age 25         Family History   Problem Relation Age of Onset   • Diabetes Father    • Stroke Father    • No Known Problems Maternal Grandmother    • No Known Problems Maternal Grandfather    • No Known Problems Paternal Grandmother    • No Known Problems Paternal Grandfather    • No Known Problems Son    • No Known Problems Daughter    • No Known Problems Daughter          Social History     Socioeconomic History   • Marital status:      Spouse name: Not on file   • Number of children: Not on file   • Years of education: Not on file   • Highest education level: Not on file   Tobacco Use   • Smoking status: Never Smoker   • Smokeless tobacco: Never Used   Substance and Sexual Activity   • Alcohol use: No   • Drug use: No   • Sexual activity: Defer         Current Outpatient Medications   Medication Sig Dispense Refill   • aspirin 81 MG chewable tablet Chew 81 mg Every Night.     • Cholecalciferol (VITAMIN D3) 5000 UNITS capsule capsule Take 5,000 Units by mouth Every Night.     • clindamycin (CLEOCIN) 300 MG capsule Take 2 capsules by mouth 3 (Three) Times a Day. 60 capsule 0   • dronedarone (MULTAQ) 400 MG tablet Take 400 mg by mouth Every Night.     • fluticasone (FLONASE) 50 MCG/ACT nasal spray 2 sprays into each nostril As Needed for Rhinitis.     •  "glimepiride (AMARYL) 2 MG tablet Take 4 mg by mouth 2 (Two) Times a Day.     • losartan (COZAAR) 25 MG tablet Take 25 mg by mouth Every Night.     • magnesium oxide (MAGOX) 400 (241.3 Mg) MG tablet tablet Take 400 mg by mouth 2 (Two) Times a Day.     • metFORMIN (GLUCOPHAGE) 1000 MG tablet Take 1,000 mg by mouth 2 (Two) Times a Day With Meals.     • metoprolol succinate XL (TOPROL-XL) 25 MG 24 hr tablet Take 25 mg by mouth Every Night. 0.5 tablet     • ONE TOUCH ULTRA TEST test strip USE TO TEST BLOOD SUGAR THREE TIMES A DAY  1   • vitamin C (ASCORBIC ACID) 500 MG tablet Take 500 mg by mouth Every Night.     • vitamin E 200 UNIT capsule Take 200 Units by mouth Every Night.       No current facility-administered medications for this visit.            OBJECTIVE    Pulse 99   Ht 193 cm (76\")   Wt (!) 143 kg (315 lb)   SpO2 99%   BMI 38.34 kg/m²     Review of Systems   Constitutional: Negative.    HENT: Negative.    Eyes: Negative.    Respiratory: Negative.    Cardiovascular: Negative.    Gastrointestinal: Negative.    Musculoskeletal: Negative.    Skin: Negative.    Psychiatric/Behavioral: Negative.              Physical Exam   Constitutional: He is oriented to person, place, and time. He appears well-developed and well-nourished. No distress.   HENT:   Head: Normocephalic and atraumatic.   Nose: Nose normal.   Eyes: Pupils are equal, round, and reactive to light. EOM are normal.   Pulmonary/Chest: Effort normal. No respiratory distress.   Neurological: He is alert and oriented to person, place, and time.   Skin: Skin is warm and dry. Capillary refill takes less than 2 seconds. No erythema.   Psychiatric: He has a normal mood and affect. His behavior is normal. Judgment normal.   Vitals reviewed.    Left Lower Extremity: Incision site is healed.  Minimal edema and erythema.  CFT immediate to distal digits.  No open wounds.  Negative Homans      Procedures        ASSESSMENT AND PLAN    Emre was seen today for " follow-up.    Diagnoses and all orders for this visit:    Diabetic ulcer of left midfoot associated with type 2 diabetes mellitus, with fat layer exposed (CMS/HCC)    Charcot's joint of left foot    Type 2 diabetes mellitus with peripheral neuropathy (CMS/HCC)      -Patien is doing well postoperatively   -Start moisturizing.  Transition back to diabetic shoes.  - Recheck 2 weeks          This document has been electronically signed by Marco A Thompson DPM on December 30, 2019 8:41 AM

## 2020-01-08 ENCOUNTER — TELEPHONE (OUTPATIENT)
Dept: PODIATRY | Facility: CLINIC | Age: 60
End: 2020-01-08

## 2020-01-08 NOTE — TELEPHONE ENCOUNTER
PATIENT IS NEEDING A ANKLE HEIGHT TOE FILLER FOR RT FT SENT TO [place]. PLEASE CALL [phone]

## 2020-01-15 ENCOUNTER — OFFICE VISIT (OUTPATIENT)
Dept: PODIATRY | Facility: CLINIC | Age: 60
End: 2020-01-15

## 2020-01-15 VITALS — HEART RATE: 111 BPM | OXYGEN SATURATION: 98 % | WEIGHT: 315 LBS | BODY MASS INDEX: 38.36 KG/M2 | HEIGHT: 76 IN

## 2020-01-15 DIAGNOSIS — L97.422 DIABETIC ULCER OF LEFT MIDFOOT ASSOCIATED WITH TYPE 2 DIABETES MELLITUS, WITH FAT LAYER EXPOSED (HCC): Primary | ICD-10-CM

## 2020-01-15 DIAGNOSIS — M14.672 CHARCOT'S JOINT OF LEFT FOOT: ICD-10-CM

## 2020-01-15 DIAGNOSIS — Z89.431 S/P TRANSMETATARSAL AMPUTATION OF FOOT, RIGHT (HCC): ICD-10-CM

## 2020-01-15 DIAGNOSIS — E11.621 DIABETIC ULCER OF LEFT MIDFOOT ASSOCIATED WITH TYPE 2 DIABETES MELLITUS, WITH FAT LAYER EXPOSED (HCC): Primary | ICD-10-CM

## 2020-01-15 DIAGNOSIS — E11.42 TYPE 2 DIABETES MELLITUS WITH PERIPHERAL NEUROPATHY (HCC): ICD-10-CM

## 2020-01-15 PROCEDURE — 99024 POSTOP FOLLOW-UP VISIT: CPT | Performed by: PODIATRIST

## 2020-01-15 NOTE — PROGRESS NOTES
Emre Lisa  1960  59 y.o. male   [place] - 6/13/2019  BS - 74 per patient    Patient presents today for a post op recheck of his left foot.    01/15/2020     Chief Complaint   Patient presents with   • Left Foot - post op recheck       History of Present Illness    Patient presents to clinic today for his postoperative visit.  Patient had plantar planing and debridement of left foot ulcer on November 21. He is weightbearing in diabetic shoes. No new complaints.     Past Medical History:   Diagnosis Date   • Arthritis    • Atrial fibrillation (CMS/HCC)    • Diabetes mellitus (CMS/HCC)    • Diabetic foot ulcer associated with type 2 diabetes mellitus (CMS/HCC)     left great toe   • Hallux malleus of left foot    • Hammer toe    • Hypertension    • MI (myocardial infarction) (CMS/HCC)    • Neuropathy in diabetes (CMS/HCC)    • Onychomycosis    • Presence of biventricular cardiac pacemaker          Past Surgical History:   Procedure Laterality Date   • AMPUTATION DIGIT Right 3/5/2017    Procedure: RIGHT AMPUTATION TRANSMETATRASAL , RECESSION GASTROCNEMOUS;  Surgeon: Marco A Thompson DPM;  Location: [facility] OR;  Service:    • CARDIAC DEFIBRILLATOR PLACEMENT  2010, 2016   • CARPAL TUNNEL RELEASE  2015    elbow and wrist left arm   • FOOT IRRIGATION, DEBRIDEMENT AND REPAIR Left 3/7/2018    Procedure: FOOT IRRIGATION, DEBRIDEMENT AND REPAIR;  Surgeon: Marco A Thompson DPM;  Location: [facility];  Service:    • FOOT IRRIGATION, DEBRIDEMENT AND REPAIR Left 3/10/2018    Procedure: FOOT IRRIGATION, DEBRIDEMENT AND REPAIR;  Surgeon: Marco A Thompson DPM;  Location: [facility] OR;  Service: Podiatry   • FOOT WOUND CLOSURE Right 3/2/2017    Procedure: INCISION AND DRAINAGE, RIGHT FOOT;  Surgeon: Tung Rosenthal DPM;  Location: [facility];  Service:    • HAMMER TOE REPAIR Left 2/15/2018    Procedure: HAMMERTOE CORRECTION LEFT SECOND, THIRD AND FOURTH TOES AND HALLUX INTERPHALANGEAL JOINT ARTHRODESIS LEFT FOOT (Micro Steveis,  4.0 & 5.0 Asnis)      (c-arm);  Surgeon: Marco A Thompson DPM;  Location: [facility] OR;  Service:    • HARDWARE REMOVAL Left 3/5/2018    Procedure: ANKLE/FOOT HARDWARE REMOVAL;  Surgeon: Marco A Thompson DPM;  Location: [facility] OR;  Service:    • INCISION AND DRAINAGE LEG Left 3/5/2018    Procedure: INCISION AND DRAINAGE LOWER EXTREMITY;  Surgeon: Marco A Thompson DPM;  Location: [facility] OR;  Service:    • PLANTAR FASCIA RELEASE Left 11/21/2019    Procedure: PLANTAR PLANING LEFT FOOT AND ALL OTHER INDICATED PROCEDURES;  Surgeon: Marco A Thompson DPM;  Location: [facility] OR;  Service: Podiatry   • TOE AMPUTATION Right 2016   • TONSILECTOMY, ADENOIDECTOMY, BILATERAL MYRINGOTOMY AND TUBES      age 25         Family History   Problem Relation Age of Onset   • Diabetes Father    • Stroke Father    • No Known Problems Maternal Grandmother    • No Known Problems Maternal Grandfather    • No Known Problems Paternal Grandmother    • No Known Problems Paternal Grandfather    • No Known Problems Son    • No Known Problems Daughter    • No Known Problems Daughter          Social History     Socioeconomic History   • Marital status:      Spouse name: Not on file   • Number of children: Not on file   • Years of education: Not on file   • Highest education level: Not on file   Tobacco Use   • Smoking status: Never Smoker   • Smokeless tobacco: Never Used   Substance and Sexual Activity   • Alcohol use: No   • Drug use: No   • Sexual activity: Defer         Current Outpatient Medications   Medication Sig Dispense Refill   • aspirin 81 MG chewable tablet Chew 81 mg Every Night.     • Cholecalciferol (VITAMIN D3) 5000 UNITS capsule capsule Take 5,000 Units by mouth Every Night.     • clindamycin (CLEOCIN) 300 MG capsule Take 2 capsules by mouth 3 (Three) Times a Day. 60 capsule 0   • dronedarone (MULTAQ) 400 MG tablet Take 400 mg by mouth Every Night.     • fluticasone (FLONASE) 50 MCG/ACT nasal spray 2 sprays into each nostril As  "Needed for Rhinitis.     • glimepiride (AMARYL) 2 MG tablet Take 4 mg by mouth 2 (Two) Times a Day.     • losartan (COZAAR) 25 MG tablet Take 25 mg by mouth Every Night.     • magnesium oxide (MAGOX) 400 (241.3 Mg) MG tablet tablet Take 400 mg by mouth 2 (Two) Times a Day.     • metFORMIN (GLUCOPHAGE) 1000 MG tablet Take 1,000 mg by mouth 2 (Two) Times a Day With Meals.     • metoprolol succinate XL (TOPROL-XL) 25 MG 24 hr tablet Take 25 mg by mouth Every Night. 0.5 tablet     • ONE TOUCH ULTRA TEST test strip USE TO TEST BLOOD SUGAR THREE TIMES A DAY  1   • vitamin C (ASCORBIC ACID) 500 MG tablet Take 500 mg by mouth Every Night.     • vitamin E 200 UNIT capsule Take 200 Units by mouth Every Night.       No current facility-administered medications for this visit.            OBJECTIVE    Pulse 111   Ht 193 cm (76\")   Wt (!) 143 kg (315 lb)   SpO2 98%   BMI 38.34 kg/m²     Review of Systems   Constitutional: Negative.    HENT: Negative.    Eyes: Negative.    Respiratory: Negative.    Cardiovascular: Negative.    Gastrointestinal: Negative.    Musculoskeletal: Negative.    Skin: Negative.    Psychiatric/Behavioral: Negative.              Physical Exam   Constitutional: He is oriented to person, place, and time. He appears well-developed and well-nourished. No distress.   HENT:   Head: Normocephalic and atraumatic.   Nose: Nose normal.   Eyes: Pupils are equal, round, and reactive to light. EOM are normal.   Pulmonary/Chest: Effort normal. No respiratory distress.   Musculoskeletal: He exhibits edema and deformity. He exhibits no tenderness.   Neurological: He is alert and oriented to person, place, and time.   Skin: Skin is warm and dry. Capillary refill takes less than 2 seconds.   Psychiatric: He has a normal mood and affect. His behavior is normal. Judgment normal.   Vitals reviewed.    Left Lower Extremity: Incision site is healed.  Minimal edema and erythema.  CFT immediate to distal digits.  No open wounds. "  Negative Homans      Procedures        ASSESSMENT AND PLAN    Emre was seen today for post op recheck.    Diagnoses and all orders for this visit:    Diabetic ulcer of left midfoot associated with type 2 diabetes mellitus, with fat layer exposed (CMS/HCC)    Charcot's joint of left foot    Type 2 diabetes mellitus with peripheral neuropathy (CMS/[facility])    S/P transmetatarsal amputation of foot, right (CMS/[facility])      -Patient is doing well postoperatively.  - Rx for custom shoe inserts.  Patient would most benefit from a molded socket ankle height with toe filler versus the gel toe filler he has now.  This will provide him more control and stability as he has increased his activity level since his left foot surgery.  - Recheck 2 weeks          This document has been electronically signed by Marco A Thompson DPM on January 15, 2020 3:51 PM

## 2020-01-21 ENCOUNTER — OFFICE VISIT (OUTPATIENT)
Dept: PODIATRY | Facility: CLINIC | Age: 60
End: 2020-01-21

## 2020-01-21 VITALS — OXYGEN SATURATION: 99 % | BODY MASS INDEX: 38.36 KG/M2 | HEIGHT: 76 IN | WEIGHT: 315 LBS | HEART RATE: 104 BPM

## 2020-01-21 DIAGNOSIS — L03.116 CELLULITIS OF LEFT FOOT: Primary | ICD-10-CM

## 2020-01-21 DIAGNOSIS — M79.672 LEFT FOOT PAIN: ICD-10-CM

## 2020-01-21 DIAGNOSIS — M79.672 LEFT FOOT PAIN: Primary | ICD-10-CM

## 2020-01-21 DIAGNOSIS — E11.621 DIABETIC ULCER OF LEFT MIDFOOT ASSOCIATED WITH TYPE 2 DIABETES MELLITUS, WITH FAT LAYER EXPOSED (HCC): ICD-10-CM

## 2020-01-21 DIAGNOSIS — L97.422 DIABETIC ULCER OF LEFT MIDFOOT ASSOCIATED WITH TYPE 2 DIABETES MELLITUS, WITH FAT LAYER EXPOSED (HCC): ICD-10-CM

## 2020-01-21 PROCEDURE — 99214 OFFICE O/P EST MOD 30 MIN: CPT | Performed by: PODIATRIST

## 2020-01-21 RX ORDER — DOXYCYCLINE HYCLATE 100 MG/1
100 CAPSULE ORAL 2 TIMES DAILY
Qty: 20 CAPSULE | Refills: 0 | Status: SHIPPED | OUTPATIENT
Start: 2020-01-21 | End: 2023-01-27

## 2020-01-21 NOTE — PROGRESS NOTES
Emre Lisa  1960  59 y.o. male   [place] - 6/13/2019  BS - 87 per patient    Patient presents today with a complaint of left foot pain and burning; xray obtained today.    01/21/2020      Chief Complaint   Patient presents with   • Left Foot - post op recheck, Pain       History of Present Illness    Emre Lisa is a patient of Dr. Thompson with longstanding history of Charcot arthropathy and multiple wound formation who previously underwent left plantar foot planing and subsequent wound healing.  He presents today for evaluation due to acute onset of severe burning and stabbing pain to the top of his left foot and ankle last night.  He does note that within the past couple days he tore a small piece of skin off of the front of his ankle with his compression stockings.  He states it seems to be most tender in this area.    Past Medical History:   Diagnosis Date   • Arthritis    • Atrial fibrillation (CMS/[facility])    • Diabetes mellitus (CMS/[facility])    • Diabetic foot ulcer associated with type 2 diabetes mellitus (CMS/[facility])     left great toe   • Hallux malleus of left foot    • Hammer toe    • Hypertension    • MI (myocardial infarction) (CMS/HCC)    • Neuropathy in diabetes (CMS/[facility])    • Onychomycosis    • Presence of biventricular cardiac pacemaker          Past Surgical History:   Procedure Laterality Date   • AMPUTATION DIGIT Right 3/5/2017    Procedure: RIGHT AMPUTATION TRANSMETATRASAL , RECESSION GASTROCNEMOUS;  Surgeon: Marco A Thompson DPM;  Location: [facility];  Service:    • CARDIAC DEFIBRILLATOR PLACEMENT  2010, 2016   • CARPAL TUNNEL RELEASE  2015    elbow and wrist left arm   • FOOT IRRIGATION, DEBRIDEMENT AND REPAIR Left 3/7/2018    Procedure: FOOT IRRIGATION, DEBRIDEMENT AND REPAIR;  Surgeon: Marco A Thompson DPM;  Location: [facility];  Service:    • FOOT IRRIGATION, DEBRIDEMENT AND REPAIR Left 3/10/2018    Procedure: FOOT IRRIGATION, DEBRIDEMENT AND REPAIR;  Surgeon: Marco A Thompson DPM;   Location: [facility] OR;  Service: Podiatry   • FOOT WOUND CLOSURE Right 3/2/2017    Procedure: INCISION AND DRAINAGE, RIGHT FOOT;  Surgeon: Tung Rosenthal DPM;  Location: [facility];  Service:    • HAMMER TOE REPAIR Left 2/15/2018    Procedure: HAMMERTOE CORRECTION LEFT SECOND, THIRD AND FOURTH TOES AND HALLUX INTERPHALANGEAL JOINT ARTHRODESIS LEFT FOOT (Micro Asnis, 4.0 & 5.0 Asnis)      (c-arm);  Surgeon: Marco A Thompson DPM;  Location: [facility];  Service:    • HARDWARE REMOVAL Left 3/5/2018    Procedure: ANKLE/FOOT HARDWARE REMOVAL;  Surgeon: Marco A Thompson DPM;  Location: [facility];  Service:    • INCISION AND DRAINAGE LEG Left 3/5/2018    Procedure: INCISION AND DRAINAGE LOWER EXTREMITY;  Surgeon: Marco A Thompson DPM;  Location: [facility];  Service:    • PLANTAR FASCIA RELEASE Left 11/21/2019    Procedure: PLANTAR PLANING LEFT FOOT AND ALL OTHER INDICATED PROCEDURES;  Surgeon: Marco A Thompson DPM;  Location: [facility];  Service: Podiatry   • TOE AMPUTATION Right 2016   • TONSILECTOMY, ADENOIDECTOMY, BILATERAL MYRINGOTOMY AND TUBES      age 25         Family History   Problem Relation Age of Onset   • Diabetes Father    • Stroke Father    • No Known Problems Maternal Grandmother    • No Known Problems Maternal Grandfather    • No Known Problems Paternal Grandmother    • No Known Problems Paternal Grandfather    • No Known Problems Son    • No Known Problems Daughter    • No Known Problems Daughter          Social History     Socioeconomic History   • Marital status:      Spouse name: Not on file   • Number of children: Not on file   • Years of education: Not on file   • Highest education level: Not on file   Tobacco Use   • Smoking status: Never Smoker   • Smokeless tobacco: Never Used   Substance and Sexual Activity   • Alcohol use: No   • Drug use: No   • Sexual activity: Defer         Current Outpatient Medications   Medication Sig Dispense Refill   • aspirin 81 MG chewable tablet Chew 81 mg Every  "Night.     • Cholecalciferol (VITAMIN D3) 5000 UNITS capsule capsule Take 5,000 Units by mouth Every Night.     • dronedarone (MULTAQ) 400 MG tablet Take 400 mg by mouth Every Night.     • fluticasone (FLONASE) 50 MCG/ACT nasal spray 2 sprays into each nostril As Needed for Rhinitis.     • glimepiride (AMARYL) 2 MG tablet Take 4 mg by mouth 2 (Two) Times a Day.     • losartan (COZAAR) 25 MG tablet Take 25 mg by mouth Every Night.     • magnesium oxide (MAGOX) 400 (241.3 Mg) MG tablet tablet Take 400 mg by mouth 2 (Two) Times a Day.     • metFORMIN (GLUCOPHAGE) 1000 MG tablet Take 1,000 mg by mouth 2 (Two) Times a Day With Meals.     • metoprolol succinate XL (TOPROL-XL) 25 MG 24 hr tablet Take 25 mg by mouth Every Night. 0.5 tablet     • ONE TOUCH ULTRA TEST test strip USE TO TEST BLOOD SUGAR THREE TIMES A DAY  1   • vitamin C (ASCORBIC ACID) 500 MG tablet Take 500 mg by mouth Every Night.     • vitamin E 200 UNIT capsule Take 200 Units by mouth Every Night.     • doxycycline (VIBRAMYCIN) 100 MG capsule Take 1 capsule by mouth 2 (Two) Times a Day. 20 capsule 0     No current facility-administered medications for this visit.            OBJECTIVE    Pulse 104   Ht 193 cm (76\")   Wt (!) 143 kg (315 lb)   SpO2 99%   BMI 38.34 kg/m²     Review of Systems   Constitutional: Negative.    HENT: Negative.    Eyes: Negative.    Respiratory: Negative.    Cardiovascular: Negative.    Gastrointestinal: Negative.    Musculoskeletal:        Left foot pain   Skin: Negative.    Psychiatric/Behavioral: Negative.              Physical Exam   Constitutional: He is oriented to person, place, and time. He appears well-developed and well-nourished. No distress.   HENT:   Head: Normocephalic and atraumatic.   Nose: Nose normal.   Eyes: Pupils are equal, round, and reactive to light. EOM are normal.   Pulmonary/Chest: Effort normal. No respiratory distress.   Musculoskeletal: He exhibits edema and deformity. He exhibits no tenderness. "   Neurological: He is alert and oriented to person, place, and time.   Skin: Skin is warm and dry. Capillary refill takes less than 2 seconds.   Psychiatric: He has a normal mood and affect. His behavior is normal. Judgment normal.   Vitals reviewed.    Left Lower Extremity:     Pulses palpable  Protective sensation absent  Pes planus with prominent plantar lateral foot, no overlying wounds at prior surgical site.  New onset partial thickness ulceration to left anterior medial ankle measuring approximately 0.4 cm in diameter.  Mild to moderate surrounding erythema.  No drainage.  No probe to bone.  No fluctuance  Lower extremity muscle strength normal  No significant increase in skin temperature.      Procedures        ASSESSMENT AND PLAN    Emre was seen today for post op recheck and pain.    Diagnoses and all orders for this visit:    Cellulitis of left foot    Diabetic ulcer of left midfoot associated with type 2 diabetes mellitus, with fat layer exposed (CMS/HCC)    Left foot pain    Other orders  -     doxycycline (VIBRAMYCIN) 100 MG capsule; Take 1 capsule by mouth 2 (Two) Times a Day.      -Patient had been doing quite well postoperatively per Dr. Thompson notes.  -Seems to have mild cellulitis to new onset anterior medial ankle wound which is consistent with the area of his pain.  -Advised local wound care.  -Rx doxycycline for 10 days  -Recheck next week with Dr. Thompson          This document has been electronically signed by Tung Rosenthal DPM on January 21, 2020 10:37 AM

## 2020-01-29 ENCOUNTER — OFFICE VISIT (OUTPATIENT)
Dept: PODIATRY | Facility: CLINIC | Age: 60
End: 2020-01-29

## 2020-01-29 VITALS — HEIGHT: 76 IN | OXYGEN SATURATION: 98 % | WEIGHT: 315 LBS | BODY MASS INDEX: 38.36 KG/M2 | HEART RATE: 96 BPM

## 2020-01-29 DIAGNOSIS — E11.621 DIABETIC ULCER OF LEFT MIDFOOT ASSOCIATED WITH TYPE 2 DIABETES MELLITUS, WITH FAT LAYER EXPOSED (HCC): Primary | ICD-10-CM

## 2020-01-29 DIAGNOSIS — L97.422 DIABETIC ULCER OF LEFT MIDFOOT ASSOCIATED WITH TYPE 2 DIABETES MELLITUS, WITH FAT LAYER EXPOSED (HCC): Primary | ICD-10-CM

## 2020-01-29 PROCEDURE — 11042 DBRDMT SUBQ TIS 1ST 20SQCM/<: CPT | Performed by: PODIATRIST

## 2020-01-29 NOTE — PROGRESS NOTES
Emre Lisa  1960  59 y.o. male   [place] - 6/13/2019  BS - 87 per patient    Patient presents today for a post op recheck of his left foot.    01/29/2020     Chief Complaint   Patient presents with   • Left Foot - Wound Check, post op recheck       History of Present Illness    Patient presents to clinic today for wound check.  Saw Dr. Rosenthal on his last visit.  Currently dressing the wound daily with medihoney.    Past Medical History:   Diagnosis Date   • Arthritis    • Atrial fibrillation (CMS/HCC)    • Diabetes mellitus (CMS/HCC)    • Diabetic foot ulcer associated with type 2 diabetes mellitus (CMS/HCC)     left great toe   • Hallux malleus of left foot    • Hammer toe    • Hypertension    • MI (myocardial infarction) (CMS/HCC)    • Neuropathy in diabetes (CMS/HCC)    • Onychomycosis    • Presence of biventricular cardiac pacemaker          Past Surgical History:   Procedure Laterality Date   • AMPUTATION DIGIT Right 3/5/2017    Procedure: RIGHT AMPUTATION TRANSMETATRASAL , RECESSION GASTROCNEMOUS;  Surgeon: Marco A Thompson DPM;  Location: [facility];  Service:    • CARDIAC DEFIBRILLATOR PLACEMENT  2010, 2016   • CARPAL TUNNEL RELEASE  2015    elbow and wrist left arm   • FOOT IRRIGATION, DEBRIDEMENT AND REPAIR Left 3/7/2018    Procedure: FOOT IRRIGATION, DEBRIDEMENT AND REPAIR;  Surgeon: Marco A Thompson DPM;  Location: [facility];  Service:    • FOOT IRRIGATION, DEBRIDEMENT AND REPAIR Left 3/10/2018    Procedure: FOOT IRRIGATION, DEBRIDEMENT AND REPAIR;  Surgeon: Marco A Thompson DPM;  Location: [facility];  Service: Podiatry   • FOOT WOUND CLOSURE Right 3/2/2017    Procedure: INCISION AND DRAINAGE, RIGHT FOOT;  Surgeon: Tung Rosenthal DPM;  Location: [facility];  Service:    • HAMMER TOE REPAIR Left 2/15/2018    Procedure: HAMMERTOE CORRECTION LEFT SECOND, THIRD AND FOURTH TOES AND HALLUX INTERPHALANGEAL JOINT ARTHRODESIS LEFT FOOT (Micro Asnis, 4.0 & 5.0 Asnis)      (c-arm);  Surgeon: Marco A FISCHER  CHRISTIAN Thompson;  Location: [facility];  Service:    • HARDWARE REMOVAL Left 3/5/2018    Procedure: ANKLE/FOOT HARDWARE REMOVAL;  Surgeon: Marco A Thompson DPM;  Location: [facility] OR;  Service:    • INCISION AND DRAINAGE LEG Left 3/5/2018    Procedure: INCISION AND DRAINAGE LOWER EXTREMITY;  Surgeon: Marco A Thompson DPM;  Location: [facility] OR;  Service:    • PLANTAR FASCIA RELEASE Left 11/21/2019    Procedure: PLANTAR PLANING LEFT FOOT AND ALL OTHER INDICATED PROCEDURES;  Surgeon: Marco A Thompson DPM;  Location: [facility];  Service: Podiatry   • TOE AMPUTATION Right 2016   • TONSILECTOMY, ADENOIDECTOMY, BILATERAL MYRINGOTOMY AND TUBES      age 25         Family History   Problem Relation Age of Onset   • Diabetes Father    • Stroke Father    • No Known Problems Maternal Grandmother    • No Known Problems Maternal Grandfather    • No Known Problems Paternal Grandmother    • No Known Problems Paternal Grandfather    • No Known Problems Son    • No Known Problems Daughter    • No Known Problems Daughter          Social History     Socioeconomic History   • Marital status:      Spouse name: Not on file   • Number of children: Not on file   • Years of education: Not on file   • Highest education level: Not on file   Tobacco Use   • Smoking status: Never Smoker   • Smokeless tobacco: Never Used   Substance and Sexual Activity   • Alcohol use: No   • Drug use: No   • Sexual activity: Defer         Current Outpatient Medications   Medication Sig Dispense Refill   • aspirin 81 MG chewable tablet Chew 81 mg Every Night.     • Cholecalciferol (VITAMIN D3) 5000 UNITS capsule capsule Take 5,000 Units by mouth Every Night.     • doxycycline (VIBRAMYCIN) 100 MG capsule Take 1 capsule by mouth 2 (Two) Times a Day. 20 capsule 0   • dronedarone (MULTAQ) 400 MG tablet Take 400 mg by mouth Every Night.     • fluticasone (FLONASE) 50 MCG/ACT nasal spray 2 sprays into each nostril As Needed for Rhinitis.     • glimepiride (AMARYL) 2 MG  "tablet Take 4 mg by mouth 2 (Two) Times a Day.     • losartan (COZAAR) 25 MG tablet Take 25 mg by mouth Every Night.     • magnesium oxide (MAGOX) 400 (241.3 Mg) MG tablet tablet Take 400 mg by mouth 2 (Two) Times a Day.     • metFORMIN (GLUCOPHAGE) 1000 MG tablet Take 1,000 mg by mouth 2 (Two) Times a Day With Meals.     • metoprolol succinate XL (TOPROL-XL) 25 MG 24 hr tablet Take 25 mg by mouth Every Night. 0.5 tablet     • ONE TOUCH ULTRA TEST test strip USE TO TEST BLOOD SUGAR THREE TIMES A DAY  1   • vitamin C (ASCORBIC ACID) 500 MG tablet Take 500 mg by mouth Every Night.     • vitamin E 200 UNIT capsule Take 200 Units by mouth Every Night.       No current facility-administered medications for this visit.            OBJECTIVE    Pulse 96   Ht 193 cm (76\")   Wt (!) 143 kg (315 lb)   SpO2 98%   BMI 38.34 kg/m²     Review of Systems   Constitutional: Negative.    HENT: Negative.    Eyes: Negative.    Respiratory: Negative.    Cardiovascular: Negative.    Gastrointestinal: Negative.    Musculoskeletal: Negative.    Skin: Negative.    Psychiatric/Behavioral: Negative.              Physical Exam   Constitutional: He is oriented to person, place, and time. He appears well-developed and well-nourished. No distress.   HENT:   Head: Normocephalic and atraumatic.   Nose: Nose normal.   Eyes: Pupils are equal, round, and reactive to light. EOM are normal.   Pulmonary/Chest: Effort normal. No respiratory distress.   Musculoskeletal: He exhibits edema and deformity. He exhibits no tenderness.   Neurological: He is alert and oriented to person, place, and time.   Skin: Skin is warm and dry. Capillary refill takes less than 2 seconds.   Psychiatric: He has a normal mood and affect. His behavior is normal. Judgment normal.   Vitals reviewed.    Left Lower Extremity: Incision site is healed.  Small open wound noted to the dorsal medial foot.  Pre-debridement wound measures 0.2 x 0.2 x 0.1 cm.  Base is fibrotic.  No foul " odor or purulent drainage.  No surrounding erythema.  No edema or erythema.  CFT immediate to distal digits. Negative Homans      Procedures      ASSESSMENT AND PLAN    Emre was seen today for wound check and post op recheck.    Diagnoses and all orders for this visit:    Diabetic ulcer of left midfoot associated with type 2 diabetes mellitus, with fat layer exposed (CMS/HCC)      -Wound is healing well.  -Excisional debridement performed down to and including subcutaneous tissue with a dermal curette.  Post debridement the wound measures 0.3 x 0.3 x 0.2 cm.  Wound was dressed.  Continue daily dressing changes.  - Recheck 1 week          This document has been electronically signed by Marco A Thompson DPM on January 29, 2020 9:12 AM

## 2020-02-05 ENCOUNTER — OFFICE VISIT (OUTPATIENT)
Dept: PODIATRY | Facility: CLINIC | Age: 60
End: 2020-02-05

## 2020-02-05 VITALS — WEIGHT: 315 LBS | HEART RATE: 82 BPM | OXYGEN SATURATION: 98 % | HEIGHT: 76 IN | BODY MASS INDEX: 38.36 KG/M2

## 2020-02-05 DIAGNOSIS — L97.422 DIABETIC ULCER OF LEFT MIDFOOT ASSOCIATED WITH TYPE 2 DIABETES MELLITUS, WITH FAT LAYER EXPOSED (HCC): Primary | ICD-10-CM

## 2020-02-05 DIAGNOSIS — E11.621 DIABETIC ULCER OF LEFT MIDFOOT ASSOCIATED WITH TYPE 2 DIABETES MELLITUS, WITH FAT LAYER EXPOSED (HCC): Primary | ICD-10-CM

## 2020-02-05 PROCEDURE — 11042 DBRDMT SUBQ TIS 1ST 20SQCM/<: CPT | Performed by: PODIATRIST

## 2020-02-05 NOTE — PROGRESS NOTES
Emre Lisa  1960  59 y.o. male   [place] - 6/13/2019  BS - 88 per patient    Patient presents today for a post op recheck of his left foot.    02/05/2020     Chief Complaint   Patient presents with   • Left Foot - post op recheck       History of Present Illness    Patient presents to clinic today for wound check.      Past Medical History:   Diagnosis Date   • Arthritis    • Atrial fibrillation (CMS/HCC)    • Diabetes mellitus (CMS/[facility])    • Diabetic foot ulcer associated with type 2 diabetes mellitus (CMS/HCC)     left great toe   • Hallux malleus of left foot    • Hammer toe    • Hypertension    • MI (myocardial infarction) (CMS/HCC)    • Neuropathy in diabetes (CMS/HCC)    • Onychomycosis    • Presence of biventricular cardiac pacemaker          Past Surgical History:   Procedure Laterality Date   • AMPUTATION DIGIT Right 3/5/2017    Procedure: RIGHT AMPUTATION TRANSMETATRASAL , RECESSION GASTROCNEMOUS;  Surgeon: Marco A Thompson DPM;  Location: [facility];  Service:    • CARDIAC DEFIBRILLATOR PLACEMENT  2010, 2016   • CARPAL TUNNEL RELEASE  2015    elbow and wrist left arm   • FOOT IRRIGATION, DEBRIDEMENT AND REPAIR Left 3/7/2018    Procedure: FOOT IRRIGATION, DEBRIDEMENT AND REPAIR;  Surgeon: Marco A Thompson DPM;  Location: [facility];  Service:    • FOOT IRRIGATION, DEBRIDEMENT AND REPAIR Left 3/10/2018    Procedure: FOOT IRRIGATION, DEBRIDEMENT AND REPAIR;  Surgeon: Mraco A Thompson DPM;  Location: [facility] [place];  Service: Podiatry   • FOOT WOUND CLOSURE Right 3/2/2017    Procedure: INCISION AND DRAINAGE, RIGHT FOOT;  Surgeon: Tung Rosenthal DPM;  Location: [facility] [place];  Service:    • HAMMER TOE REPAIR Left 2/15/2018    Procedure: HAMMERTOE CORRECTION LEFT SECOND, THIRD AND FOURTH TOES AND HALLUX INTERPHALANGEAL JOINT ARTHRODESIS LEFT FOOT (Micro Asnis, 4.0 & 5.0 Asnis)      (c-arm);  Surgeon: Marco A Thompson DPM;  Location: [facility] [place] OR;  Service:    • HARDWARE REMOVAL Left 3/5/2018    Procedure:  ANKLE/FOOT HARDWARE REMOVAL;  Surgeon: Marco A Thompson DPM;  Location: [facility] OR;  Service:    • INCISION AND DRAINAGE LEG Left 3/5/2018    Procedure: INCISION AND DRAINAGE LOWER EXTREMITY;  Surgeon: Marco A Thompson DPM;  Location: [facility] OR;  Service:    • PLANTAR FASCIA RELEASE Left 11/21/2019    Procedure: PLANTAR PLANING LEFT FOOT AND ALL OTHER INDICATED PROCEDURES;  Surgeon: Marco A Thompson DPM;  Location: [facility] OR;  Service: Podiatry   • TOE AMPUTATION Right 2016   • TONSILECTOMY, ADENOIDECTOMY, BILATERAL MYRINGOTOMY AND TUBES      age 25         Family History   Problem Relation Age of Onset   • Diabetes Father    • Stroke Father    • No Known Problems Maternal Grandmother    • No Known Problems Maternal Grandfather    • No Known Problems Paternal Grandmother    • No Known Problems Paternal Grandfather    • No Known Problems Son    • No Known Problems Daughter    • No Known Problems Daughter          Social History     Socioeconomic History   • Marital status:      Spouse name: Not on file   • Number of children: Not on file   • Years of education: Not on file   • Highest education level: Not on file   Tobacco Use   • Smoking status: Never Smoker   • Smokeless tobacco: Never Used   Substance and Sexual Activity   • Alcohol use: No   • Drug use: No   • Sexual activity: Defer         Current Outpatient Medications   Medication Sig Dispense Refill   • aspirin 81 MG chewable tablet Chew 81 mg Every Night.     • Cholecalciferol (VITAMIN D3) 5000 UNITS capsule capsule Take 5,000 Units by mouth Every Night.     • doxycycline (VIBRAMYCIN) 100 MG capsule Take 1 capsule by mouth 2 (Two) Times a Day. 20 capsule 0   • dronedarone (MULTAQ) 400 MG tablet Take 400 mg by mouth Every Night.     • fluticasone (FLONASE) 50 MCG/ACT nasal spray 2 sprays into each nostril As Needed for Rhinitis.     • glimepiride (AMARYL) 2 MG tablet Take 4 mg by mouth 2 (Two) Times a Day.     • losartan (COZAAR) 25 MG tablet Take 25 mg  "by mouth Every Night.     • magnesium oxide (MAGOX) 400 (241.3 Mg) MG tablet tablet Take 400 mg by mouth 2 (Two) Times a Day.     • metFORMIN (GLUCOPHAGE) 1000 MG tablet Take 1,000 mg by mouth 2 (Two) Times a Day With Meals.     • metoprolol succinate XL (TOPROL-XL) 25 MG 24 hr tablet Take 25 mg by mouth Every Night. 0.5 tablet     • ONE TOUCH ULTRA TEST test strip USE TO TEST BLOOD SUGAR THREE TIMES A DAY  1   • vitamin C (ASCORBIC ACID) 500 MG tablet Take 500 mg by mouth Every Night.     • vitamin E 200 UNIT capsule Take 200 Units by mouth Every Night.       No current facility-administered medications for this visit.            OBJECTIVE    Pulse 82   Ht 193 cm (76\")   Wt (!) 143 kg (315 lb)   SpO2 98%   BMI 38.34 kg/m²     Review of Systems   Constitutional: Negative.    HENT: Negative.    Eyes: Negative.    Respiratory: Negative.    Cardiovascular: Negative.    Gastrointestinal: Negative.    Musculoskeletal: Negative.    Skin: Positive for wound.   Psychiatric/Behavioral: Negative.              Physical Exam   Constitutional: He is oriented to person, place, and time. He appears well-developed and well-nourished. No distress.   HENT:   Head: Normocephalic and atraumatic.   Nose: Nose normal.   Eyes: Pupils are equal, round, and reactive to light. EOM are normal.   Pulmonary/Chest: Effort normal. No respiratory distress.   Musculoskeletal: He exhibits edema and deformity. He exhibits no tenderness.   Neurological: He is alert and oriented to person, place, and time.   Skin: Skin is warm and dry. Capillary refill takes less than 2 seconds. No erythema.   Psychiatric: He has a normal mood and affect. His behavior is normal. Judgment normal.   Vitals reviewed.    Left Lower Extremity: Incision site is healed.  Small open wound noted to the dorsal medial foot.  Pre-debridement wound measures 0.1 x 0.1 x 0.1 cm.  Base is fibrotic.  No foul odor or purulent drainage.  No surrounding erythema.  No edema or " erythema.  CFT immediate to distal digits. Negative Homans      Procedures      ASSESSMENT AND PLAN    Emre was seen today for post op recheck.    Diagnoses and all orders for this visit:    Diabetic ulcer of left midfoot associated with type 2 diabetes mellitus, with fat layer exposed (CMS/HCC)      -Wound continues to heal well  -Excisional debridement performed down to and including subcutaneous tissue with a dermal curette.  Post debridement the wound measures 0.2 x 0.2 x 0.1 cm.  Wound was dressed.  Continue daily dressing changes.  - Recheck 2 weeks           This document has been electronically signed by Marco A Thompson DPM on February 8, 2020 4:24 PM

## 2020-02-24 ENCOUNTER — OFFICE VISIT (OUTPATIENT)
Dept: PODIATRY | Facility: CLINIC | Age: 60
End: 2020-02-24

## 2020-02-24 VITALS — WEIGHT: 315 LBS | OXYGEN SATURATION: 100 % | HEART RATE: 79 BPM | BODY MASS INDEX: 38.36 KG/M2 | HEIGHT: 76 IN

## 2020-02-24 DIAGNOSIS — L97.422 DIABETIC ULCER OF LEFT MIDFOOT ASSOCIATED WITH TYPE 2 DIABETES MELLITUS, WITH FAT LAYER EXPOSED (HCC): Primary | ICD-10-CM

## 2020-02-24 DIAGNOSIS — E11.621 DIABETIC ULCER OF LEFT MIDFOOT ASSOCIATED WITH TYPE 2 DIABETES MELLITUS, WITH FAT LAYER EXPOSED (HCC): Primary | ICD-10-CM

## 2020-02-24 PROCEDURE — 99212 OFFICE O/P EST SF 10 MIN: CPT | Performed by: PODIATRIST

## 2020-02-24 NOTE — PROGRESS NOTES
Emre Lisa  1960  59 y.o. male   [place] - 6/13/2019  BS - 85  per patient    Patient presents today for wound check on right foot     02/24/2020     Chief Complaint   Patient presents with   • Right Foot - Wound Check, Follow-up       History of Present Illness    Patient presents to clinic today for wound check.  States that his wounds are healed.    Past Medical History:   Diagnosis Date   • Arthritis    • Atrial fibrillation (CMS/HCC)    • Diabetes mellitus (CMS/HCC)    • Diabetic foot ulcer associated with type 2 diabetes mellitus (CMS/HCC)     left great toe   • Hallux malleus of left foot    • Hammer toe    • Hypertension    • MI (myocardial infarction) (CMS/HCC)    • Neuropathy in diabetes (CMS/HCC)    • Onychomycosis    • Presence of biventricular cardiac pacemaker          Past Surgical History:   Procedure Laterality Date   • AMPUTATION DIGIT Right 3/5/2017    Procedure: RIGHT AMPUTATION TRANSMETATRASAL , RECESSION GASTROCNEMOUS;  Surgeon: Marco A Thompson DPM;  Location: [facility];  Service:    • CARDIAC DEFIBRILLATOR PLACEMENT  2010, 2016   • CARPAL TUNNEL RELEASE  2015    elbow and wrist left arm   • FOOT IRRIGATION, DEBRIDEMENT AND REPAIR Left 3/7/2018    Procedure: FOOT IRRIGATION, DEBRIDEMENT AND REPAIR;  Surgeon: Marco A Thompson DPM;  Location: [facility];  Service:    • FOOT IRRIGATION, DEBRIDEMENT AND REPAIR Left 3/10/2018    Procedure: FOOT IRRIGATION, DEBRIDEMENT AND REPAIR;  Surgeon: Marco A Thompson DPM;  Location: [facility] [place];  Service: Podiatry   • FOOT WOUND CLOSURE Right 3/2/2017    Procedure: INCISION AND DRAINAGE, RIGHT FOOT;  Surgeon: Tung Rosenthal DPM;  Location: [facility] OR;  Service:    • HAMMER TOE REPAIR Left 2/15/2018    Procedure: HAMMERTOE CORRECTION LEFT SECOND, THIRD AND FOURTH TOES AND HALLUX INTERPHALANGEAL JOINT ARTHRODESIS LEFT FOOT (Micro Asnis, 4.0 & 5.0 Asnis)      (c-arm);  Surgeon: Marco A Thompson DPM;  Location: [facility] OR;  Service:    • HARDWARE REMOVAL  Left 3/5/2018    Procedure: ANKLE/FOOT HARDWARE REMOVAL;  Surgeon: Marco A Thompson DPM;  Location: [facility] OR;  Service:    • INCISION AND DRAINAGE LEG Left 3/5/2018    Procedure: INCISION AND DRAINAGE LOWER EXTREMITY;  Surgeon: Marco A Thompson DPM;  Location: [facility] OR;  Service:    • PLANTAR FASCIA RELEASE Left 11/21/2019    Procedure: PLANTAR PLANING LEFT FOOT AND ALL OTHER INDICATED PROCEDURES;  Surgeon: Marco A Thompson DPM;  Location: [facility] OR;  Service: Podiatry   • TOE AMPUTATION Right 2016   • TONSILECTOMY, ADENOIDECTOMY, BILATERAL MYRINGOTOMY AND TUBES      age 25         Family History   Problem Relation Age of Onset   • Diabetes Father    • Stroke Father    • No Known Problems Maternal Grandmother    • No Known Problems Maternal Grandfather    • No Known Problems Paternal Grandmother    • No Known Problems Paternal Grandfather    • No Known Problems Son    • No Known Problems Daughter    • No Known Problems Daughter          Social History     Socioeconomic History   • Marital status:      Spouse name: Not on file   • Number of children: Not on file   • Years of education: Not on file   • Highest education level: Not on file   Tobacco Use   • Smoking status: Never Smoker   • Smokeless tobacco: Never Used   Substance and Sexual Activity   • Alcohol use: No   • Drug use: No   • Sexual activity: Defer         Current Outpatient Medications   Medication Sig Dispense Refill   • aspirin 81 MG chewable tablet Chew 81 mg Every Night.     • Cholecalciferol (VITAMIN D3) 5000 UNITS capsule capsule Take 5,000 Units by mouth Every Night.     • doxycycline (VIBRAMYCIN) 100 MG capsule Take 1 capsule by mouth 2 (Two) Times a Day. 20 capsule 0   • dronedarone (MULTAQ) 400 MG tablet Take 400 mg by mouth Every Night.     • fluticasone (FLONASE) 50 MCG/ACT nasal spray 2 sprays into each nostril As Needed for Rhinitis.     • glimepiride (AMARYL) 2 MG tablet Take 4 mg by mouth 2 (Two) Times a Day.     • losartan  "(COZAAR) 25 MG tablet Take 25 mg by mouth Every Night.     • magnesium oxide (MAGOX) 400 (241.3 Mg) MG tablet tablet Take 400 mg by mouth 2 (Two) Times a Day.     • metFORMIN (GLUCOPHAGE) 1000 MG tablet Take 1,000 mg by mouth 2 (Two) Times a Day With Meals.     • metoprolol succinate XL (TOPROL-XL) 25 MG 24 hr tablet Take 25 mg by mouth Every Night. 0.5 tablet     • ONE TOUCH ULTRA TEST test strip USE TO TEST BLOOD SUGAR THREE TIMES A DAY  1   • vitamin C (ASCORBIC ACID) 500 MG tablet Take 500 mg by mouth Every Night.     • vitamin E 200 UNIT capsule Take 200 Units by mouth Every Night.       No current facility-administered medications for this visit.            OBJECTIVE    Pulse 79   Ht 193 cm (76\")   Wt (!) 143 kg (315 lb)   SpO2 100%   BMI 38.34 kg/m²     Review of Systems   Constitutional: Negative.    HENT: Negative.    Eyes: Negative.    Respiratory: Negative.    Cardiovascular: Negative.    Gastrointestinal: Negative.    Musculoskeletal: Negative.    Psychiatric/Behavioral: Negative.              Physical Exam   Constitutional: He is oriented to person, place, and time. He appears well-developed and well-nourished. No distress.   HENT:   Head: Normocephalic and atraumatic.   Nose: Nose normal.   Eyes: Pupils are equal, round, and reactive to light. EOM are normal.   Pulmonary/Chest: Effort normal. No respiratory distress.   Musculoskeletal: He exhibits deformity. He exhibits no tenderness.   Neurological: He is alert and oriented to person, place, and time.   Skin: Skin is warm and dry. Capillary refill takes less than 2 seconds. No erythema.   Psychiatric: He has a normal mood and affect. His behavior is normal. Judgment normal.   Vitals reviewed.    Left Lower Extremity: Incision site is healed.  No open wounds noted.  CFT immediate to distal digits. Negative Homans      Procedures      ASSESSMENT AND PLAN    Emre was seen today for wound check and follow-up.    Diagnoses and all orders for this " visit:    Diabetic ulcer of left midfoot associated with type 2 diabetes mellitus, with fat layer exposed (CMS/HCC)      -Wounds are healed.  -Start AmLactin twice daily  -Recheck at next scheduled diabetic foot care appointment          This document has been electronically signed by Marco A Thompson DPM on February 24, 2020 12:44 PM

## 2020-03-13 ENCOUNTER — TELEPHONE (OUTPATIENT)
Dept: PODIATRY | Facility: CLINIC | Age: 60
End: 2020-03-13

## 2020-03-13 NOTE — TELEPHONE ENCOUNTER
PT CALLED AND WANTED KNOW WHAT DOSAGE HE WAS PRESCIBED ON 1- FOR CELLULITIS.  I LOOKED IT UP IN CHART AND GAVE HIM THE INFO.  NO ACTION NEEDED

## 2020-06-29 ENCOUNTER — LAB REQUISITION (OUTPATIENT)
Dept: LAB | Facility: HOSPITAL | Age: 60
End: 2020-06-29

## 2020-06-29 ENCOUNTER — LAB (OUTPATIENT)
Dept: LAB | Facility: HOSPITAL | Age: 60
End: 2020-06-29

## 2020-06-29 DIAGNOSIS — M05.20 RHEUMATOID VASCULITIS WITH RHEUMATOID ARTHRITIS OF UNSPECIFIED SITE (HCC): ICD-10-CM

## 2020-06-29 DIAGNOSIS — L50.9 URTICARIA, UNSPECIFIED: ICD-10-CM

## 2020-06-29 PROCEDURE — 36415 COLL VENOUS BLD VENIPUNCTURE: CPT | Performed by: NURSE PRACTITIONER

## 2020-08-04 ENCOUNTER — TRANSCRIBE ORDERS (OUTPATIENT)
Dept: LAB | Facility: HOSPITAL | Age: 60
End: 2020-08-04

## 2020-08-04 DIAGNOSIS — Z00.00 PREVENTATIVE HEALTH CARE: ICD-10-CM

## 2020-08-04 DIAGNOSIS — Z12.5 SCREENING FOR PROSTATE CANCER: ICD-10-CM

## 2020-08-04 DIAGNOSIS — E11.42 TYPE 2 DIABETES MELLITUS WITH DIABETIC POLYNEUROPATHY, WITHOUT LONG-TERM CURRENT USE OF INSULIN (HCC): ICD-10-CM

## 2020-08-04 DIAGNOSIS — I10 ESSENTIAL (PRIMARY) HYPERTENSION: Primary | ICD-10-CM

## 2020-08-04 DIAGNOSIS — E55.9 VITAMIN D DEFICIENCY: ICD-10-CM

## 2020-08-05 ENCOUNTER — LAB (OUTPATIENT)
Dept: LAB | Facility: HOSPITAL | Age: 60
End: 2020-08-05

## 2020-08-05 ENCOUNTER — LAB REQUISITION (OUTPATIENT)
Dept: LAB | Facility: HOSPITAL | Age: 60
End: 2020-08-05

## 2020-08-05 DIAGNOSIS — Z00.00 ENCOUNTER FOR GENERAL ADULT MEDICAL EXAMINATION WITHOUT ABNORMAL FINDINGS: ICD-10-CM

## 2021-01-19 ENCOUNTER — TRANSCRIBE ORDERS (OUTPATIENT)
Dept: LAB | Facility: HOSPITAL | Age: 61
End: 2021-01-19

## 2021-01-19 DIAGNOSIS — E55.9 VITAMIN D DEFICIENCY, UNSPECIFIED: Primary | ICD-10-CM

## 2021-01-19 DIAGNOSIS — E11.42 TYPE 2 DIABETES MELLITUS WITH DIABETIC POLYNEUROPATHY, WITHOUT LONG-TERM CURRENT USE OF INSULIN (HCC): ICD-10-CM

## 2021-01-21 ENCOUNTER — TRANSCRIBE ORDERS (OUTPATIENT)
Dept: LAB | Facility: HOSPITAL | Age: 61
End: 2021-01-21

## 2021-01-21 DIAGNOSIS — E55.9 VITAMIN D DEFICIENCY: Primary | ICD-10-CM

## 2021-01-21 DIAGNOSIS — E11.42 TYPE 2 DIABETES MELLITUS WITH DIABETIC POLYNEUROPATHY, WITHOUT LONG-TERM CURRENT USE OF INSULIN (HCC): ICD-10-CM

## 2021-06-02 ENCOUNTER — OFFICE VISIT (OUTPATIENT)
Dept: PODIATRY | Facility: CLINIC | Age: 61
End: 2021-06-02

## 2021-06-02 VITALS
BODY MASS INDEX: 38.36 KG/M2 | WEIGHT: 315 LBS | HEIGHT: 76 IN | SYSTOLIC BLOOD PRESSURE: 130 MMHG | OXYGEN SATURATION: 99 % | DIASTOLIC BLOOD PRESSURE: 85 MMHG

## 2021-06-02 DIAGNOSIS — M14.672 CHARCOT'S JOINT OF LEFT FOOT: ICD-10-CM

## 2021-06-02 DIAGNOSIS — G89.29 CHRONIC PAIN OF TOE OF LEFT FOOT: ICD-10-CM

## 2021-06-02 DIAGNOSIS — B35.1 ONYCHOMYCOSIS: ICD-10-CM

## 2021-06-02 DIAGNOSIS — Z89.431 S/P TRANSMETATARSAL AMPUTATION OF FOOT, RIGHT (HCC): ICD-10-CM

## 2021-06-02 DIAGNOSIS — E11.9 ENCOUNTER FOR DIABETIC FOOT EXAM (HCC): Primary | ICD-10-CM

## 2021-06-02 DIAGNOSIS — E11.42 TYPE 2 DIABETES MELLITUS WITH PERIPHERAL NEUROPATHY (HCC): ICD-10-CM

## 2021-06-02 DIAGNOSIS — M79.675 CHRONIC PAIN OF TOE OF LEFT FOOT: ICD-10-CM

## 2021-06-02 PROCEDURE — 11720 DEBRIDE NAIL 1-5: CPT | Performed by: PODIATRIST

## 2021-06-02 PROCEDURE — 99213 OFFICE O/P EST LOW 20 MIN: CPT | Performed by: PODIATRIST

## 2021-06-02 RX ORDER — FEXOFENADINE HCL 180 MG/1
180 TABLET ORAL DAILY
COMMUNITY
Start: 2021-05-11 | End: 2021-12-08

## 2021-06-02 RX ORDER — MAGNESIUM OXIDE 400 MG/1
1 TABLET ORAL 2 TIMES DAILY
COMMUNITY
Start: 2021-03-04 | End: 2021-06-02

## 2021-06-02 RX ORDER — ASPIRIN 81 MG/1
81 TABLET ORAL DAILY
COMMUNITY
End: 2022-12-15

## 2021-06-02 RX ORDER — FLUTICASONE PROPIONATE 50 MCG
SPRAY, SUSPENSION (ML) NASAL
COMMUNITY
Start: 2021-02-13 | End: 2021-06-02

## 2021-06-02 RX ORDER — TRIAMCINOLONE ACETONIDE 1 MG/G
CREAM TOPICAL
COMMUNITY
Start: 2020-06-17 | End: 2021-06-18

## 2021-06-02 NOTE — PROGRESS NOTES
Emre Lisa  1960  61 y.o. male   [place] - 1/13/2021  BS - 99  per patient    06/02/2021     Chief Complaint   Patient presents with   • Left Foot - diabetic foot care       History of Present Illness    61-year-old male presents to clinic today for diabetic foot exam and care.  He requests new diabetic shoes and inserts today.    Past Medical History:   Diagnosis Date   • Arthritis    • Atrial fibrillation (CMS/[facility])    • Diabetes mellitus (CMS/[facility])    • Diabetic foot ulcer associated with type 2 diabetes mellitus (CMS/[facility])     left great toe   • Hallux malleus of left foot    • Hammer toe    • Hypertension    • MI (myocardial infarction) (CMS/[facility])    • Neuropathy in diabetes (CMS/[facility])    • Onychomycosis    • Presence of biventricular cardiac pacemaker          Past Surgical History:   Procedure Laterality Date   • AMPUTATION DIGIT Right 3/5/2017    Procedure: RIGHT AMPUTATION TRANSMETATRASAL , RECESSION GASTROCNEMOUS;  Surgeon: Marco A Thompson DPM;  Location: [facility];  Service:    • CARDIAC DEFIBRILLATOR PLACEMENT  2010, 2016   • CARPAL TUNNEL RELEASE  2015    elbow and wrist left arm   • FOOT IRRIGATION, DEBRIDEMENT AND REPAIR Left 3/7/2018    Procedure: FOOT IRRIGATION, DEBRIDEMENT AND REPAIR;  Surgeon: Marco A Thompson DPM;  Location: [facility] OR;  Service:    • FOOT IRRIGATION, DEBRIDEMENT AND REPAIR Left 3/10/2018    Procedure: FOOT IRRIGATION, DEBRIDEMENT AND REPAIR;  Surgeon: Marco A Thompson DPM;  Location: [facility] OR;  Service: Podiatry   • FOOT WOUND CLOSURE Right 3/2/2017    Procedure: INCISION AND DRAINAGE, RIGHT FOOT;  Surgeon: Tung Rosenthal DPM;  Location: [facility] OR;  Service:    • HAMMER TOE REPAIR Left 2/15/2018    Procedure: HAMMERTOE CORRECTION LEFT SECOND, THIRD AND FOURTH TOES AND HALLUX INTERPHALANGEAL JOINT ARTHRODESIS LEFT FOOT (Micro Asnis, 4.0 & 5.0 Asnis)      (c-arm);  Surgeon: Marco A Thompson DPM;  Location: [facility] OR;  Service:    • HARDWARE REMOVAL Left 3/5/2018     Procedure: ANKLE/FOOT HARDWARE REMOVAL;  Surgeon: Marco A Thompson DPM;  Location: [facility] OR;  Service:    • INCISION AND DRAINAGE LEG Left 3/5/2018    Procedure: INCISION AND DRAINAGE LOWER EXTREMITY;  Surgeon: Marco A Thompson DPM;  Location: [facility] OR;  Service:    • PLANTAR FASCIA RELEASE Left 11/21/2019    Procedure: PLANTAR PLANING LEFT FOOT AND ALL OTHER INDICATED PROCEDURES;  Surgeon: Marco A Thompson DPM;  Location: [facility] OR;  Service: Podiatry   • TOE AMPUTATION Right 2016   • TONSILECTOMY, ADENOIDECTOMY, BILATERAL MYRINGOTOMY AND TUBES      age 25         Family History   Problem Relation Age of Onset   • Diabetes Father    • Stroke Father    • No Known Problems Maternal Grandmother    • No Known Problems Maternal Grandfather    • No Known Problems Paternal Grandmother    • No Known Problems Paternal Grandfather    • No Known Problems Son    • No Known Problems Daughter    • No Known Problems Daughter          Social History     Socioeconomic History   • Marital status:      Spouse name: Not on file   • Number of children: Not on file   • Years of education: Not on file   • Highest education level: Not on file   Tobacco Use   • Smoking status: Never Smoker   • Smokeless tobacco: Never Used   Vaping Use   • Vaping Use: Never used   Substance and Sexual Activity   • Alcohol use: No   • Drug use: No   • Sexual activity: Defer         Current Outpatient Medications   Medication Sig Dispense Refill   • aspirin (aspirin) 81 MG EC tablet Take 81 mg by mouth Daily.     • Cholecalciferol (VITAMIN D3) 5000 UNITS capsule capsule Take 5,000 Units by mouth Every Night.     • doxycycline (VIBRAMYCIN) 100 MG capsule Take 1 capsule by mouth 2 (Two) Times a Day. 20 capsule 0   • dronedarone (MULTAQ) 400 MG tablet Take 400 mg by mouth Every Night.     • fexofenadine (ALLEGRA) 180 MG tablet Take 180 mg by mouth Daily.     • fluticasone (FLONASE) 50 MCG/ACT nasal spray 2 sprays into each nostril As Needed for Rhinitis.  "    • glimepiride (AMARYL) 2 MG tablet Take 4 mg by mouth 2 (Two) Times a Day.     • losartan (COZAAR) 25 MG tablet Take 25 mg by mouth Every Night.     • magnesium oxide (MAGOX) 400 (241.3 Mg) MG tablet tablet Take 400 mg by mouth 2 (Two) Times a Day.     • metFORMIN (GLUCOPHAGE) 1000 MG tablet Take 1,000 mg by mouth 2 (Two) Times a Day With Meals.     • metoprolol succinate XL (TOPROL-XL) 25 MG 24 hr tablet Take 25 mg by mouth Every Night. 0.5 tablet     • ONE TOUCH ULTRA TEST test strip USE TO TEST BLOOD SUGAR THREE TIMES A DAY  1   • triamcinolone (KENALOG) 0.1 % cream Apply  topically to the appropriate area as directed.     • vitamin C (ASCORBIC ACID) 500 MG tablet Take 500 mg by mouth Every Night.     • vitamin E 200 UNIT capsule Take 200 Units by mouth Every Night.       No current facility-administered medications for this visit.           OBJECTIVE    /85   Ht 193 cm (76\")   Wt (!) 143 kg (315 lb)   SpO2 99%   BMI 38.34 kg/m²     Review of Systems   Constitutional: Negative.    HENT: Negative.    Eyes: Negative.    Respiratory: Negative.    Cardiovascular: Negative.    Gastrointestinal: Negative.    Musculoskeletal: Negative.    Skin: Negative.    Psychiatric/Behavioral: Negative.              Physical Exam   Constitutional: He is oriented to person, place, and time. He appears well-developed. No distress.   HENT:   Head: Normocephalic and atraumatic.   Nose: Nose normal.   Eyes: Pupils are equal, round, and reactive to light.   Pulmonary/Chest: Effort normal. No respiratory distress.   Musculoskeletal: Deformity present. No tenderness.    Emre had a diabetic foot exam performed today.   During the foot exam he had a monofilament test performed.  Neurological: He is alert and oriented to person, place, and time.   Skin: Skin is warm and dry. Capillary refill takes less than 2 seconds. No erythema.   Psychiatric: His behavior is normal. Judgment normal.   Vitals reviewed.    Lower Extremity Exam: "     Cardiovascular:    DP/PT pulses palpable b/l    CFT brisk  to all digits b/l  No erythema or edema noted b/l  Musculoskeletal:  Muscle strength is within normal limits  Rocker-bottom deformity left.  TMA right  No pain on palpation noted  b/l  Dermatological:   Toenails 1 through 5 left are thickened, discolored, elongated with subungual debris.    Skin is warm, dry and intact b/l  No subcutaneous nodules or masses noted  b/l  No open wounds noted b/l  Neurological:   Protective sensation absent b/l            Procedures      ASSESSMENT AND PLAN    Diagnoses and all orders for this visit:    1. Encounter for diabetic foot exam (CMS/[facility]) (Primary)    2. Type 2 diabetes mellitus with peripheral neuropathy (CMS/[facility])    3. S/P transmetatarsal amputation of foot, right (CMS/[facility])    4. Charcot's joint of left foot    5. Onychomycosis    6. Chronic pain of toe of left foot      - A diabetic foot screening exam was performed and the patient was educated on the foot complications related to diabetes,  preventative foot care and what signs and symptoms to watch for.  Instructed to contact our office if any foot problems develop before next visit.  - Nails 1-5 left were debrided in length and thickness with nail nipper and electric  to decrease fungal load and risk of infection.  -Rx for diabetic shoes and custom inserts with toe filler on the right   - All the patients questions were answered.  - RTC 3 months or sooner if needed.              This document has been electronically signed by Marco A Thompson DPM on June 2, 2021 12:06 CDT

## 2021-06-24 ENCOUNTER — TRANSCRIBE ORDERS (OUTPATIENT)
Dept: LAB | Facility: HOSPITAL | Age: 61
End: 2021-06-24

## 2021-06-24 DIAGNOSIS — E13.42 DIABETIC POLYNEUROPATHY ASSOCIATED WITH OTHER SPECIFIED DIABETES MELLITUS (HCC): Primary | ICD-10-CM

## 2021-06-28 ENCOUNTER — TELEPHONE (OUTPATIENT)
Dept: PODIATRY | Facility: CLINIC | Age: 61
End: 2021-06-28

## 2021-06-29 ENCOUNTER — APPOINTMENT (OUTPATIENT)
Dept: LAB | Facility: HOSPITAL | Age: 61
End: 2021-06-29

## 2021-06-29 ENCOUNTER — LAB REQUISITION (OUTPATIENT)
Dept: LAB | Facility: HOSPITAL | Age: 61
End: 2021-06-29

## 2021-06-29 DIAGNOSIS — E13.42 OTHER SPECIFIED DIABETES MELLITUS WITH DIABETIC POLYNEUROPATHY (HCC): ICD-10-CM

## 2021-06-29 PROCEDURE — 36415 COLL VENOUS BLD VENIPUNCTURE: CPT | Performed by: FAMILY MEDICINE

## 2021-10-16 ENCOUNTER — APPOINTMENT (OUTPATIENT)
Dept: GENERAL RADIOLOGY | Facility: HOSPITAL | Age: 61
End: 2021-10-16

## 2021-10-16 ENCOUNTER — HOSPITAL ENCOUNTER (EMERGENCY)
Facility: HOSPITAL | Age: 61
Discharge: HOME | End: 2021-10-16
Attending: FAMILY MEDICINE | Admitting: FAMILY MEDICINE

## 2021-10-16 VITALS
WEIGHT: 315 LBS | BODY MASS INDEX: 38.36 KG/M2 | HEART RATE: 83 BPM | HEIGHT: 76 IN | RESPIRATION RATE: 18 BRPM | SYSTOLIC BLOOD PRESSURE: 145 MMHG | DIASTOLIC BLOOD PRESSURE: 72 MMHG | OXYGEN SATURATION: 97 %
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
## 2021-10-16 DIAGNOSIS — S16.1XXA STRAIN OF NECK MUSCLE, INITIAL ENCOUNTER: ICD-10-CM

## 2021-10-16 DIAGNOSIS — S29.019A THORACIC MYOFASCIAL STRAIN, INITIAL ENCOUNTER: Primary | ICD-10-CM

## 2021-10-16 PROCEDURE — 96372 THER/PROPH/DIAG INJ SC/IM: CPT

## 2021-10-16 PROCEDURE — 72040 X-RAY EXAM NECK SPINE 2-3 VW: CPT

## 2021-10-16 PROCEDURE — 25010000002 TRIAMCINOLONE PER 10 MG: Performed by: FAMILY MEDICINE

## 2021-10-16 PROCEDURE — 72072 X-RAY EXAM THORAC SPINE 3VWS: CPT

## 2021-10-16 PROCEDURE — 99283 EMERGENCY DEPT VISIT LOW MDM: CPT

## 2021-10-16 RX ORDER — CYCLOBENZAPRINE HCL 10 MG
10 TABLET ORAL 3 TIMES DAILY PRN
Qty: 21 TABLET | Refills: 0 | Status: SHIPPED | OUTPATIENT
Start: 2021-10-16 | End: 2022-12-15

## 2021-10-16 RX ORDER — TRIAMCINOLONE ACETONIDE 40 MG/ML
40 INJECTION, SUSPENSION INTRA-ARTICULAR; INTRAMUSCULAR ONCE
Status: COMPLETED | OUTPATIENT
Start: 2021-10-16 | End: 2021-10-16

## 2021-10-16 RX ORDER — HYDROCODONE BITARTRATE AND ACETAMINOPHEN 7.5; 325 MG/1; MG/1
1 TABLET ORAL EVERY 6 HOURS PRN
Qty: 12 TABLET | Refills: 0 | Status: SHIPPED | OUTPATIENT
Start: 2021-10-16 | End: 2022-12-15

## 2021-10-16 RX ORDER — HYDROCODONE BITARTRATE AND ACETAMINOPHEN 7.5; 325 MG/1; MG/1
1 TABLET ORAL ONCE
Status: COMPLETED | OUTPATIENT
Start: 2021-10-16 | End: 2021-10-16

## 2021-10-16 RX ORDER — NITROGLYCERIN 0.4 MG/1
0.4 TABLET SUBLINGUAL
Status: DISCONTINUED | OUTPATIENT
Start: 2021-10-16 | End: 2021-10-16 | Stop reason: HOSPADM

## 2021-10-16 RX ADMIN — HYDROCODONE BITARTRATE AND ACETAMINOPHEN 1 TABLET: 7.5; 325 TABLET ORAL at 15:27

## 2021-10-16 RX ADMIN — TRIAMCINOLONE ACETONIDE 40 MG: 40 INJECTION, SUSPENSION INTRA-ARTICULAR; INTRAMUSCULAR at 17:23
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
## 2021-12-08 ENCOUNTER — LAB (OUTPATIENT)
Dept: LAB | Facility: HOSPITAL | Age: 61
End: 2021-12-08

## 2021-12-08 ENCOUNTER — OFFICE VISIT (OUTPATIENT)
Dept: PODIATRY | Facility: CLINIC | Age: 61
End: 2021-12-08

## 2021-12-08 VITALS
HEIGHT: 76 IN | WEIGHT: 315 LBS | OXYGEN SATURATION: 99 % | HEART RATE: 99 BPM | SYSTOLIC BLOOD PRESSURE: 174 MMHG | BODY MASS INDEX: 38.36 KG/M2 | DIASTOLIC BLOOD PRESSURE: 92 MMHG

## 2021-12-08 DIAGNOSIS — T84.7XXA HARDWARE COMPLICATING WOUND INFECTION, INITIAL ENCOUNTER (HCC): Primary | ICD-10-CM

## 2021-12-08 DIAGNOSIS — T85.848A PAIN FROM IMPLANTED HARDWARE, INITIAL ENCOUNTER: ICD-10-CM

## 2021-12-08 LAB
BASOPHILS # BLD AUTO: 0.03 10*3/MM3 (ref 0–0.2)
BASOPHILS NFR BLD AUTO: 0.6 % (ref 0–1.5)
CRP SERPL-MCNC: <0.3 MG/DL (ref 0–0.5)
DEPRECATED RDW RBC AUTO: 44 FL (ref 37–54)
EOSINOPHIL # BLD AUTO: 0.14 10*3/MM3 (ref 0–0.4)
EOSINOPHIL NFR BLD AUTO: 2.9 % (ref 0.3–6.2)
ERYTHROCYTE [DISTWIDTH] IN BLOOD BY AUTOMATED COUNT: 13.4 % (ref 12.3–15.4)
ERYTHROCYTE [SEDIMENTATION RATE] IN BLOOD: 6 MM/HR (ref 0–20)
HCT VFR BLD AUTO: 40.8 % (ref 37.5–51)
HGB BLD-MCNC: 14.7 G/DL (ref 13–17.7)
IMM GRANULOCYTES # BLD AUTO: 0.05 10*3/MM3 (ref 0–0.05)
IMM GRANULOCYTES NFR BLD AUTO: 1 % (ref 0–0.5)
LYMPHOCYTES # BLD AUTO: 1.42 10*3/MM3 (ref 0.7–3.1)
LYMPHOCYTES NFR BLD AUTO: 28.9 % (ref 19.6–45.3)
MCH RBC QN AUTO: 32.7 PG (ref 26.6–33)
MCHC RBC AUTO-ENTMCNC: 36 G/DL (ref 31.5–35.7)
MCV RBC AUTO: 90.9 FL (ref 79–97)
MONOCYTES # BLD AUTO: 0.6 10*3/MM3 (ref 0.1–0.9)
MONOCYTES NFR BLD AUTO: 12.2 % (ref 5–12)
NEUTROPHILS NFR BLD AUTO: 2.67 10*3/MM3 (ref 1.7–7)
NEUTROPHILS NFR BLD AUTO: 54.4 % (ref 42.7–76)
NRBC BLD AUTO-RTO: 0 /100 WBC (ref 0–0.2)
PLATELET # BLD AUTO: 160 10*3/MM3 (ref 140–450)
PMV BLD AUTO: 11.3 FL (ref 6–12)
RBC # BLD AUTO: 4.49 10*6/MM3 (ref 4.14–5.8)
WBC NRBC COR # BLD: 4.91 10*3/MM3 (ref 3.4–10.8)

## 2021-12-08 PROCEDURE — 20670 REMOVAL IMPLANT SUPERFICIAL: CPT | Performed by: PODIATRIST

## 2021-12-08 PROCEDURE — 85652 RBC SED RATE AUTOMATED: CPT

## 2021-12-08 PROCEDURE — 87070 CULTURE OTHR SPECIMN AEROBIC: CPT | Performed by: PODIATRIST

## 2021-12-08 PROCEDURE — 85025 COMPLETE CBC W/AUTO DIFF WBC: CPT

## 2021-12-08 PROCEDURE — 36415 COLL VENOUS BLD VENIPUNCTURE: CPT

## 2021-12-08 PROCEDURE — 87076 CULTURE ANAEROBE IDENT EACH: CPT | Performed by: PODIATRIST

## 2021-12-08 PROCEDURE — 86140 C-REACTIVE PROTEIN: CPT

## 2021-12-08 PROCEDURE — 99214 OFFICE O/P EST MOD 30 MIN: CPT | Performed by: PODIATRIST

## 2021-12-08 PROCEDURE — 87205 SMEAR GRAM STAIN: CPT | Performed by: PODIATRIST

## 2021-12-08 RX ORDER — FEXOFENADINE HCL 180 MG/1
1 TABLET ORAL NIGHTLY
COMMUNITY
Start: 2021-07-19

## 2021-12-08 RX ORDER — MAGNESIUM OXIDE 400 MG/1
400 TABLET ORAL 2 TIMES DAILY
COMMUNITY
Start: 2021-11-25

## 2021-12-08 RX ORDER — DOXYCYCLINE HYCLATE 100 MG/1
100 CAPSULE ORAL 2 TIMES DAILY
Qty: 20 CAPSULE | Refills: 0 | Status: SHIPPED | OUTPATIENT
Start: 2021-12-08 | End: 2022-12-15

## 2021-12-08 RX ORDER — BLOOD-GLUCOSE METER
EACH MISCELLANEOUS
COMMUNITY
Start: 2021-10-18

## 2021-12-08 NOTE — PROGRESS NOTES
Emre Lisa  1960  61 y.o. male   [place] - 7/19/2021  BS - 104  per patient    Patient came to clinic for diabetic foot care.     12/08/2021     Chief Complaint   Patient presents with   • Left Foot - diabetic foot care   • Right Foot - diabetic foot exam       History of Present Illness    61-year-old male presents to clinic today for diabetic fo care.    Past Medical History:   Diagnosis Date   • Arthritis    • Atrial fibrillation (HCC)    • Diabetes mellitus (HCC)    • Diabetic foot ulcer associated with type 2 diabetes mellitus (HCC)     left great toe   • Hallux malleus of left foot    • Hammer toe    • Hypertension    • MI (myocardial infarction) ([facility])    • Neuropathy in diabetes (HCC)    • Onychomycosis    • Presence of biventricular cardiac pacemaker          Past Surgical History:   Procedure Laterality Date   • AMPUTATION DIGIT Right 3/5/2017    Procedure: RIGHT AMPUTATION TRANSMETATRASAL , RECESSION GASTROCNEMOUS;  Surgeon: Marco A Thompson DPM;  Location: [facility];  Service:    • CARDIAC DEFIBRILLATOR PLACEMENT  2010, 2016   • CARPAL TUNNEL RELEASE  2015    elbow and wrist left arm   • FOOT IRRIGATION, DEBRIDEMENT AND REPAIR Left 3/7/2018    Procedure: FOOT IRRIGATION, DEBRIDEMENT AND REPAIR;  Surgeon: Marco A Thompson DPM;  Location: [facility];  Service:    • FOOT IRRIGATION, DEBRIDEMENT AND REPAIR Left 3/10/2018    Procedure: FOOT IRRIGATION, DEBRIDEMENT AND REPAIR;  Surgeon: Marco A Thompson DPM;  Location: [facility] OR;  Service: Podiatry   • FOOT WOUND CLOSURE Right 3/2/2017    Procedure: INCISION AND DRAINAGE, RIGHT FOOT;  Surgeon: Tung Rosenthal DPM;  Location: [facility] OR;  Service:    • HAMMER TOE REPAIR Left 2/15/2018    Procedure: HAMMERTOE CORRECTION LEFT SECOND, THIRD AND FOURTH TOES AND HALLUX INTERPHALANGEAL JOINT ARTHRODESIS LEFT FOOT (Micro Asnis, 4.0 & 5.0 Asnis)      (c-arm);  Surgeon: Marco A Thompson DPM;  Location: [facility] OR;  Service:    • HARDWARE REMOVAL Left 3/5/2018     Procedure: ANKLE/FOOT HARDWARE REMOVAL;  Surgeon: Marco A Thompson DPM;  Location: [facility] OR;  Service:    • INCISION AND DRAINAGE LEG Left 3/5/2018    Procedure: INCISION AND DRAINAGE LOWER EXTREMITY;  Surgeon: Marco A Thompson DPM;  Location: [facility] OR;  Service:    • PLANTAR FASCIA RELEASE Left 11/21/2019    Procedure: PLANTAR PLANING LEFT FOOT AND ALL OTHER INDICATED PROCEDURES;  Surgeon: Marco A Thompson DPM;  Location: [facility] OR;  Service: Podiatry   • TOE AMPUTATION Right 2016   • TONSILECTOMY, ADENOIDECTOMY, BILATERAL MYRINGOTOMY AND TUBES      age 25         Family History   Problem Relation Age of Onset   • Diabetes Father    • Stroke Father    • No Known Problems Maternal Grandmother    • No Known Problems Maternal Grandfather    • No Known Problems Paternal Grandmother    • No Known Problems Paternal Grandfather    • No Known Problems Son    • No Known Problems Daughter    • No Known Problems Daughter          Social History     Socioeconomic History   • Marital status:    Tobacco Use   • Smoking status: Never Smoker   • Smokeless tobacco: Never Used   Vaping Use   • Vaping Use: Never used   Substance and Sexual Activity   • Alcohol use: No   • Drug use: No   • Sexual activity: Defer         Current Outpatient Medications   Medication Sig Dispense Refill   • ascorbic acid (VITAMIN C) 1000 MG tablet Take 1,000 mg by mouth Daily.     • aspirin (aspirin) 81 MG EC tablet Take 81 mg by mouth Daily.     • Blood Glucose Monitoring Suppl (ONE TOUCH ULTRA 2) w/Device kit      • Cholecalciferol 125 MCG (5000 UT) tablet Take 5,000 Units by mouth Daily.     • cyclobenzaprine (FLEXERIL) 10 MG tablet Take 1 tablet by mouth 3 (Three) Times a Day As Needed for Muscle Spasms. 21 tablet 0   • doxycycline (VIBRAMYCIN) 100 MG capsule Take 1 capsule by mouth 2 (Two) Times a Day. 20 capsule 0   • dronedarone (MULTAQ) 400 MG tablet Take 400 mg by mouth Every Night.     • fexofenadine (ALLEGRA) 180 MG tablet Take 1 tablet  "by mouth Daily.     • fluticasone (FLONASE) 50 MCG/ACT nasal spray 2 sprays into each nostril As Needed for Rhinitis.     • glimepiride (AMARYL) 2 MG tablet Take 4 mg by mouth 2 (Two) Times a Day.     • HYDROcodone-acetaminophen (NORCO) 7.5-325 MG per tablet Take 1 tablet by mouth Every 6 (Six) Hours As Needed for Moderate Pain . 12 tablet 0   • losartan (COZAAR) 25 MG tablet Take 25 mg by mouth Every Night.     • magnesium oxide (MAG-OX) 400 MG tablet      • metFORMIN (GLUCOPHAGE) 1000 MG tablet Take 1,000 mg by mouth 2 (Two) Times a Day With Meals.     • metoprolol succinate XL (TOPROL-XL) 25 MG 24 hr tablet Take 25 mg by mouth Every Night. 0.5 tablet     • ONE TOUCH ULTRA TEST test strip USE TO TEST BLOOD SUGAR THREE TIMES A DAY  1   • vitamin C (ASCORBIC ACID) 500 MG tablet Take 500 mg by mouth Every Night.     • vitamin E 200 UNIT capsule Take 200 Units by mouth Every Night.     • doxycycline (VIBRAMYCIN) 100 MG capsule Take 1 capsule by mouth 2 (Two) Times a Day. 20 capsule 0     No current facility-administered medications for this visit.           OBJECTIVE    /92   Pulse 99   Ht 193 cm (76\")   Wt (!) 159 kg (350 lb)   SpO2 99%   BMI 42.60 kg/m²     Review of Systems   Constitutional: Negative.    HENT: Negative.    Eyes: Negative.    Respiratory: Negative.    Cardiovascular: Negative.    Gastrointestinal: Negative.    Musculoskeletal: Negative.    Skin: Negative.    Psychiatric/Behavioral: Negative.              Physical Exam   Constitutional: He is oriented to person, place, and time. He appears well-developed. No distress.   HENT:   Head: Normocephalic and atraumatic.   Nose: Nose normal.   Eyes: Pupils are equal, round, and reactive to light.   Pulmonary/Chest: Effort normal. No respiratory distress.   Musculoskeletal: Deformity present. No tenderness.   Neurological: He is alert and oriented to person, place, and time.   Skin: Skin is warm and dry. Capillary refill takes less than 2 seconds. " No erythema.   Psychiatric: His behavior is normal. Judgment normal.   Vitals reviewed.    Lower Extremity Exam:     Cardiovascular:    DP/PT pulses palpable b/l    CFT brisk  to all digits b/l  No erythema or edema noted b/l  Musculoskeletal:  Muscle strength is within normal limits  Rocker-bottom deformity left.  TMA right  No pain on palpation noted  b/l  Dermatological:   Toenails 1 through 5 left are thickened, discolored, elongated with subungual debris.    Screw head noted to distal left fourth toe.  Small pocket of purulence noted to the dorsal aspect of the fourth toe with mild surrounding erythema.  Neurological:   Protective sensation absent b/l            Hardware removal     Date/Time: 12/8/2021 11:58 AM  Performed by: Marco A Thompson DPM  Authorized by: Marco A Thompson DPM   Consent: Verbal consent obtained.  Risks and benefits: risks, benefits and alternatives were discussed  Consent given by: patient  Patient identity confirmed: verbally with patient  Body area: skin  General location: lower extremity  Location details: left fourth toe  Anesthesia method: No anesthetic necessary due to neuropathy.    Sedation:  Patient sedated: no    Patient restrained: no  Dressing: antibiotic ointment and dressing applied  Complexity: simple  1 objects recovered.  Objects recovered: Screw  Post-procedure assessment: foreign body removed  Patient tolerance: patient tolerated the procedure well with no immediate complications          ASSESSMENT AND PLAN    Diagnoses and all orders for this visit:    1. Hardware complicating wound infection, initial encounter ([facility]) (Primary)  -     Sedimentation Rate; Future  -     C-reactive Protein; Future  -     CBC & Differential; Future  -     XR Foot 3+ View Left  -     Hardware / Foreign Body Culture - Hardware / Foreign Body, Toe, Left    2. Pain from implanted hardware, initial encounter    Other orders  -     doxycycline (VIBRAMYCIN) 100 MG capsule; Take 1 capsule by  mouth 2 (Two) Times a Day.  Dispense: 20 capsule; Refill: 0  -     Foreign Body Removal      -Patient examined and evaluated  -Screw removed intact in hole with needle .  Screws sent for culture  -Purulence expressed from dorsal aspect of toe.  Rx for doxycycline.  Site care as instructed.  -Radiographs taken reviewed  - All the patients questions were answered.  - RTC 1 week             This document has been electronically signed by Marco A Thompson DPM on December 10, 2021 11:59 CST

## 2021-12-10 LAB
BACTERIA SPEC AEROBE CULT: ABNORMAL
BACTERIA SPEC AEROBE CULT: ABNORMAL
GRAM STN SPEC: ABNORMAL
GRAM STN SPEC: ABNORMAL

## 2021-12-15 ENCOUNTER — OFFICE VISIT (OUTPATIENT)
Dept: PODIATRY | Facility: CLINIC | Age: 61
End: 2021-12-15

## 2021-12-15 VITALS — BODY MASS INDEX: 38.36 KG/M2 | OXYGEN SATURATION: 98 % | HEIGHT: 76 IN | HEART RATE: 111 BPM | WEIGHT: 315 LBS

## 2021-12-15 DIAGNOSIS — G89.29 CHRONIC PAIN OF TOE OF LEFT FOOT: ICD-10-CM

## 2021-12-15 DIAGNOSIS — M14.672 CHARCOT'S JOINT OF LEFT FOOT: ICD-10-CM

## 2021-12-15 DIAGNOSIS — E11.42 TYPE 2 DIABETES MELLITUS WITH PERIPHERAL NEUROPATHY (HCC): Primary | ICD-10-CM

## 2021-12-15 DIAGNOSIS — B35.1 ONYCHOMYCOSIS: ICD-10-CM

## 2021-12-15 DIAGNOSIS — M79.675 CHRONIC PAIN OF TOE OF LEFT FOOT: ICD-10-CM

## 2021-12-15 DIAGNOSIS — L03.116 CELLULITIS OF LEFT FOOT: ICD-10-CM

## 2021-12-15 PROCEDURE — 11720 DEBRIDE NAIL 1-5: CPT | Performed by: PODIATRIST

## 2021-12-15 PROCEDURE — 99024 POSTOP FOLLOW-UP VISIT: CPT | Performed by: PODIATRIST

## 2021-12-15 RX ORDER — LOSARTAN POTASSIUM 25 MG/1
25 TABLET ORAL DAILY
COMMUNITY
Start: 2021-07-19

## 2021-12-15 RX ORDER — GLIMEPIRIDE 4 MG/1
4 TABLET ORAL NIGHTLY
COMMUNITY
Start: 2021-07-19

## 2021-12-15 NOTE — PROGRESS NOTES
Emre Lisa  1960  61 y.o. male   [place] - 7/19/2021  BS - 114  per patient      12/15/2021     Chief Complaint   Patient presents with   • Left Foot - Follow-up       History of Present Illness    61-year-old male presents to clinic for diabetic foot care and follow-up on cellulitis to left fourth toe.  He is taking his antibiotics.  He denies any new complaints.    Past Medical History:   Diagnosis Date   • Arthritis    • Atrial fibrillation (HCC)    • Diabetes mellitus (HCC)    • Diabetic foot ulcer associated with type 2 diabetes mellitus (HCC)     left great toe   • Hallux malleus of left foot    • Hammer toe    • Hypertension    • MI (myocardial infarction) (HCC)    • Neuropathy in diabetes (HCC)    • Onychomycosis    • Presence of biventricular cardiac pacemaker          Past Surgical History:   Procedure Laterality Date   • AMPUTATION DIGIT Right 3/5/2017    Procedure: RIGHT AMPUTATION TRANSMETATRASAL , RECESSION GASTROCNEMOUS;  Surgeon: Marco A Thompson DPM;  Location: [facility];  Service:    • CARDIAC DEFIBRILLATOR PLACEMENT  2010, 2016   • CARPAL TUNNEL RELEASE  2015    elbow and wrist left arm   • FOOT IRRIGATION, DEBRIDEMENT AND REPAIR Left 3/7/2018    Procedure: FOOT IRRIGATION, DEBRIDEMENT AND REPAIR;  Surgeon: Marco A Thompson DPM;  Location: [facility] OR;  Service:    • FOOT IRRIGATION, DEBRIDEMENT AND REPAIR Left 3/10/2018    Procedure: FOOT IRRIGATION, DEBRIDEMENT AND REPAIR;  Surgeon: Marco A Thompson DPM;  Location: [facility] OR;  Service: Podiatry   • FOOT WOUND CLOSURE Right 3/2/2017    Procedure: INCISION AND DRAINAGE, RIGHT FOOT;  Surgeon: Tung Rosenthal DPM;  Location: [facility] OR;  Service:    • HAMMER TOE REPAIR Left 2/15/2018    Procedure: HAMMERTOE CORRECTION LEFT SECOND, THIRD AND FOURTH TOES AND HALLUX INTERPHALANGEAL JOINT ARTHRODESIS LEFT FOOT (Micro Asnis, 4.0 & 5.0 Asnis)      (c-arm);  Surgeon: Marco A Thompson DPM;  Location: [facility] OR;  Service:    • HARDWARE REMOVAL Left  3/5/2018    Procedure: ANKLE/FOOT HARDWARE REMOVAL;  Surgeon: Marco A Thompson DPM;  Location: [facility] OR;  Service:    • INCISION AND DRAINAGE LEG Left 3/5/2018    Procedure: INCISION AND DRAINAGE LOWER EXTREMITY;  Surgeon: Marco A Thompson DPM;  Location: [facility] OR;  Service:    • PLANTAR FASCIA RELEASE Left 11/21/2019    Procedure: PLANTAR PLANING LEFT FOOT AND ALL OTHER INDICATED PROCEDURES;  Surgeon: Marco A Thompson DPM;  Location: [facility] OR;  Service: Podiatry   • TOE AMPUTATION Right 2016   • TONSILECTOMY, ADENOIDECTOMY, BILATERAL MYRINGOTOMY AND TUBES      age 25         Family History   Problem Relation Age of Onset   • Diabetes Father    • Stroke Father    • No Known Problems Maternal Grandmother    • No Known Problems Maternal Grandfather    • No Known Problems Paternal Grandmother    • No Known Problems Paternal Grandfather    • No Known Problems Son    • No Known Problems Daughter    • No Known Problems Daughter          Social History     Socioeconomic History   • Marital status:    Tobacco Use   • Smoking status: Never Smoker   • Smokeless tobacco: Never Used   Vaping Use   • Vaping Use: Never used   Substance and Sexual Activity   • Alcohol use: No   • Drug use: No   • Sexual activity: Defer         Current Outpatient Medications   Medication Sig Dispense Refill   • ascorbic acid (VITAMIN C) 1000 MG tablet Take 1,000 mg by mouth Daily.     • aspirin (aspirin) 81 MG EC tablet Take 81 mg by mouth Daily.     • Blood Glucose Monitoring Suppl (ONE TOUCH ULTRA 2) w/Device kit      • Cholecalciferol 125 MCG (5000 UT) tablet Take 5,000 Units by mouth Daily.     • cyclobenzaprine (FLEXERIL) 10 MG tablet Take 1 tablet by mouth 3 (Three) Times a Day As Needed for Muscle Spasms. 21 tablet 0   • doxycycline (VIBRAMYCIN) 100 MG capsule Take 1 capsule by mouth 2 (Two) Times a Day. 20 capsule 0   • doxycycline (VIBRAMYCIN) 100 MG capsule Take 1 capsule by mouth 2 (Two) Times a Day. 20 capsule 0   • dronedarone  "(MULTAQ) 400 MG tablet Take 400 mg by mouth Every Night.     • fexofenadine (ALLEGRA) 180 MG tablet Take 1 tablet by mouth Daily.     • fluticasone (FLONASE) 50 MCG/ACT nasal spray 2 sprays into each nostril As Needed for Rhinitis.     • glimepiride (AMARYL) 4 MG tablet Take 4 mg by mouth.     • HYDROcodone-acetaminophen (NORCO) 7.5-325 MG per tablet Take 1 tablet by mouth Every 6 (Six) Hours As Needed for Moderate Pain . 12 tablet 0   • losartan (COZAAR) 25 MG tablet Take 25 mg by mouth Daily.     • magnesium oxide (MAG-OX) 400 MG tablet      • metFORMIN (GLUCOPHAGE) 1000 MG tablet Take 1 tablet by mouth 2 (Two) Times a Day With Meals.     • metoprolol succinate XL (TOPROL-XL) 25 MG 24 hr tablet Take 25 mg by mouth Every Night. 0.5 tablet     • ONE TOUCH ULTRA TEST test strip USE TO TEST BLOOD SUGAR THREE TIMES A DAY  1   • vitamin C (ASCORBIC ACID) 500 MG tablet Take 500 mg by mouth Every Night.     • vitamin E 200 UNIT capsule Take 200 Units by mouth Every Night.     • vitamin E 200 UNIT capsule Take 200 Units by mouth Daily.       No current facility-administered medications for this visit.           OBJECTIVE    Pulse 111   Ht 193 cm (76\")   Wt (!) 159 kg (350 lb)   SpO2 98%   BMI 42.60 kg/m²     Review of Systems   Constitutional: Negative.    HENT: Negative.    Eyes: Negative.    Respiratory: Negative.    Cardiovascular: Negative.    Gastrointestinal: Negative.    Musculoskeletal: Negative.    Skin: Negative.    Psychiatric/Behavioral: Negative.              Physical Exam   Constitutional: He is oriented to person, place, and time. He appears well-developed. No distress.   HENT:   Head: Normocephalic and atraumatic.   Nose: Nose normal.   Eyes: Pupils are equal, round, and reactive to light.   Pulmonary/Chest: Effort normal. No respiratory distress.   Musculoskeletal: Deformity present. No tenderness.   Neurological: He is alert and oriented to person, place, and time.   Skin: Skin is warm and dry. " Capillary refill takes less than 2 seconds. No erythema.   Psychiatric: His behavior is normal. Judgment normal.   Vitals reviewed.    Lower Extremity Exam:     Cardiovascular:    DP/PT pulses palpable b/l    CFT brisk  to all digits b/l  No erythema or edema  Musculoskeletal:  Muscle strength is within normal limits  Rocker-bottom deformity left.  TMA right  No pain on palpation noted  b/l  Dermatological:   Toenails 1 through 5 left are thickened, discolored, elongated with subungual debris.  Pain on palpation to the nail plates.  No open wounds.  Neurological:   Protective sensation absent 10 out of 10 sites on the left            Procedures      ASSESSMENT AND PLAN    Diagnoses and all orders for this visit:    1. Type 2 diabetes mellitus with peripheral neuropathy (HCC) (Primary)    2. Onychomycosis    3. Chronic pain of toe of left foot    4. Charcot's joint of left foot    5. Cellulitis of left foot      -Left fourth toe healing well.  Continue antibiotics until course is complete.  No further dressings needed.  -toenails 1 through 5 on the left were debrided in length and thickness with nail nipper and electric  to decrease fungal load and risk of infection.   -All the patients questions were answered.  -RTC 3 months             This document has been electronically signed by Marco A Thompson DPM on December 15, 2021 13:29 CST

## 2022-01-07 ENCOUNTER — OFFICE VISIT (OUTPATIENT)
Dept: WOUND CARE | Facility: HOSPITAL | Age: 62
End: 2022-01-07

## 2022-01-07 ENCOUNTER — HOSPITAL ENCOUNTER (OUTPATIENT)
Dept: GENERAL RADIOLOGY | Facility: HOSPITAL | Age: 62
Discharge: HOME | End: 2022-01-07

## 2022-01-07 ENCOUNTER — LAB REQUISITION (OUTPATIENT)
Dept: LAB | Facility: HOSPITAL | Age: 62
End: 2022-01-07

## 2022-01-07 ENCOUNTER — OUTSIDE FACILITY SERVICE (OUTPATIENT)
Dept: PODIATRY | Facility: CLINIC | Age: 62
End: 2022-01-07

## 2022-01-07 DIAGNOSIS — E11.621 TYPE 2 DIABETES MELLITUS WITH FOOT ULCER (CODE): ICD-10-CM

## 2022-01-07 DIAGNOSIS — L97.514 ULCER OF RIGHT FOOT WITH NECROSIS OF BONE: Primary | ICD-10-CM

## 2022-01-07 LAB
ALBUMIN SERPL-MCNC: 3.8 G/DL (ref 3.5–5.2)
ALBUMIN/GLOB SERPL: 1.3 G/DL
ALP SERPL-CCNC: 60 U/L (ref 39–117)
ALT SERPL W P-5'-P-CCNC: 69 U/L (ref 1–41)
ANION GAP SERPL CALCULATED.3IONS-SCNC: 10 MMOL/L (ref 5–15)
AST SERPL-CCNC: 61 U/L (ref 1–40)
BASOPHILS # BLD AUTO: 0.03 10*3/MM3 (ref 0–0.2)
BASOPHILS NFR BLD AUTO: 0.9 % (ref 0–1.5)
BILIRUB SERPL-MCNC: 0.8 MG/DL (ref 0–1.2)
BUN SERPL-MCNC: 25 MG/DL (ref 6–20)
BUN/CREAT SERPL: 20.8 (ref 7–25)
CALCIUM SPEC-SCNC: 8.6 MG/DL (ref 8.6–10.5)
CHLORIDE SERPL-SCNC: 101 MMOL/L (ref 98–107)
CO2 SERPL-SCNC: 27 MMOL/L (ref 22–29)
CREAT SERPL-MCNC: 1.2 MG/DL (ref 0.76–1.27)
CRP SERPL-MCNC: 19.2 MG/DL (ref 0–0.5)
DEPRECATED RDW RBC AUTO: 45.7 FL (ref 37–54)
EOSINOPHIL # BLD AUTO: 0.05 10*3/MM3 (ref 0–0.4)
EOSINOPHIL NFR BLD AUTO: 1.5 % (ref 0.3–6.2)
ERYTHROCYTE [DISTWIDTH] IN BLOOD BY AUTOMATED COUNT: 13.5 % (ref 12.3–15.4)
ERYTHROCYTE [SEDIMENTATION RATE] IN BLOOD: 25 MM/HR (ref 0–15)
GFR SERPL CREATININE-BSD FRML MDRD: 62 ML/MIN/1.73
GLOBULIN UR ELPH-MCNC: 2.9 GM/DL
GLUCOSE SERPL-MCNC: 209 MG/DL (ref 65–99)
HCT VFR BLD AUTO: 38.9 % (ref 37.5–51)
HGB BLD-MCNC: 13.2 G/DL (ref 13–17.7)
IMM GRANULOCYTES # BLD AUTO: 0.01 10*3/MM3 (ref 0–0.05)
IMM GRANULOCYTES NFR BLD AUTO: 0.3 % (ref 0–0.5)
LYMPHOCYTES # BLD AUTO: 0.59 10*3/MM3 (ref 0.7–3.1)
LYMPHOCYTES NFR BLD AUTO: 17.3 % (ref 19.6–45.3)
MCH RBC QN AUTO: 31.2 PG (ref 26.6–33)
MCHC RBC AUTO-ENTMCNC: 33.9 G/DL (ref 31.5–35.7)
MCV RBC AUTO: 92 FL (ref 79–97)
MONOCYTES # BLD AUTO: 0.45 10*3/MM3 (ref 0.1–0.9)
MONOCYTES NFR BLD AUTO: 13.2 % (ref 5–12)
NEUTROPHILS NFR BLD AUTO: 2.29 10*3/MM3 (ref 1.7–7)
NEUTROPHILS NFR BLD AUTO: 66.8 % (ref 42.7–76)
NRBC BLD AUTO-RTO: 0 /100 WBC (ref 0–0.2)
PLATELET # BLD AUTO: 95 10*3/MM3 (ref 140–450)
PMV BLD AUTO: 10.7 FL (ref 6–12)
POTASSIUM SERPL-SCNC: 4.5 MMOL/L (ref 3.5–5.2)
PROT SERPL-MCNC: 6.7 G/DL (ref 6–8.5)
RBC # BLD AUTO: 4.23 10*6/MM3 (ref 4.14–5.8)
SODIUM SERPL-SCNC: 138 MMOL/L (ref 136–145)
WBC NRBC COR # BLD: 3.42 10*3/MM3 (ref 3.4–10.8)

## 2022-01-07 PROCEDURE — 87205 SMEAR GRAM STAIN: CPT | Performed by: PODIATRIST

## 2022-01-07 PROCEDURE — 87077 CULTURE AEROBIC IDENTIFY: CPT | Performed by: PODIATRIST

## 2022-01-07 PROCEDURE — 80053 COMPREHEN METABOLIC PANEL: CPT | Performed by: PODIATRIST

## 2022-01-07 PROCEDURE — 36415 COLL VENOUS BLD VENIPUNCTURE: CPT | Performed by: PODIATRIST

## 2022-01-07 PROCEDURE — 73630 X-RAY EXAM OF FOOT: CPT

## 2022-01-07 PROCEDURE — 85651 RBC SED RATE NONAUTOMATED: CPT | Performed by: PODIATRIST

## 2022-01-07 PROCEDURE — 87186 SC STD MICRODIL/AGAR DIL: CPT | Performed by: PODIATRIST

## 2022-01-07 PROCEDURE — 85025 COMPLETE CBC W/AUTO DIFF WBC: CPT | Performed by: PODIATRIST

## 2022-01-07 PROCEDURE — 86140 C-REACTIVE PROTEIN: CPT | Performed by: PODIATRIST

## 2022-01-07 PROCEDURE — G0463 HOSPITAL OUTPT CLINIC VISIT: HCPCS

## 2022-01-07 PROCEDURE — 11042 DBRDMT SUBQ TIS 1ST 20SQCM/<: CPT | Performed by: PODIATRIST

## 2022-01-07 PROCEDURE — 87070 CULTURE OTHR SPECIMN AEROBIC: CPT | Performed by: PODIATRIST

## 2022-01-07 PROCEDURE — 99213 OFFICE O/P EST LOW 20 MIN: CPT | Performed by: PODIATRIST

## 2022-01-07 RX ORDER — DOXYCYCLINE HYCLATE 100 MG/1
100 CAPSULE ORAL 2 TIMES DAILY
Qty: 20 CAPSULE | Refills: 0 | Status: SHIPPED | OUTPATIENT
Start: 2022-01-07 | End: 2022-12-15

## 2022-01-09 LAB
BACTERIA SPEC AEROBE CULT: ABNORMAL
GRAM STN SPEC: ABNORMAL
GRAM STN SPEC: ABNORMAL

## 2022-01-10 RX ORDER — LEVOFLOXACIN 500 MG/1
500 TABLET, FILM COATED ORAL DAILY
Qty: 10 TABLET | Refills: 0 | Status: SHIPPED | OUTPATIENT
Start: 2022-01-10 | End: 2022-12-15 | Stop reason: SDUPTHER

## 2022-01-11 ENCOUNTER — OFFICE VISIT (OUTPATIENT)
Dept: PODIATRY | Facility: CLINIC | Age: 62
End: 2022-01-11

## 2022-01-11 VITALS
BODY MASS INDEX: 38.36 KG/M2 | OXYGEN SATURATION: 97 % | WEIGHT: 315 LBS | HEIGHT: 76 IN | HEART RATE: 103 BPM | DIASTOLIC BLOOD PRESSURE: 86 MMHG | SYSTOLIC BLOOD PRESSURE: 152 MMHG

## 2022-01-11 DIAGNOSIS — L97.514 ULCER OF RIGHT FOOT WITH NECROSIS OF BONE: Primary | ICD-10-CM

## 2022-01-11 DIAGNOSIS — E11.42 TYPE 2 DIABETES MELLITUS WITH PERIPHERAL NEUROPATHY: ICD-10-CM

## 2022-01-11 PROCEDURE — 11042 DBRDMT SUBQ TIS 1ST 20SQCM/<: CPT | Performed by: PODIATRIST

## 2022-01-11 NOTE — PROGRESS NOTES
Emre Bergeron Sloan  1960  61 y.o. male   PCP: Rasheed Bravo MD - 7/19/2021  BS -  120 per patient    Right foot ulcer, left second toe bruised.    01/11/2022     Chief Complaint   Patient presents with   • Right Foot - Wound Check, Follow-up   • Left Foot - Follow-up, Wound Check       History of Present Illness    Diabetic male presents to clinic for follow-up right foot wound.    Past Medical History:   Diagnosis Date   • Arthritis    • Atrial fibrillation (HCC)    • Diabetes mellitus (HCC)    • Diabetic foot ulcer associated with type 2 diabetes mellitus (HCC)     left great toe   • Hallux malleus of left foot    • Hammer toe    • Hypertension    • MI (myocardial infarction) (HCC)    • Neuropathy in diabetes (HCC)    • Onychomycosis    • Presence of biventricular cardiac pacemaker          Past Surgical History:   Procedure Laterality Date   • AMPUTATION DIGIT Right 3/5/2017    Procedure: RIGHT AMPUTATION TRANSMETATRASAL , RECESSION GASTROCNEMOUS;  Surgeon: Marco A Thompson DPM;  Location: [facility];  Service:    • CARDIAC DEFIBRILLATOR PLACEMENT  2010, 2016   • CARPAL TUNNEL RELEASE  2015    elbow and wrist left arm   • FOOT IRRIGATION, DEBRIDEMENT AND REPAIR Left 3/7/2018    Procedure: FOOT IRRIGATION, DEBRIDEMENT AND REPAIR;  Surgeon: Marco A Thompson DPM;  Location: [facility] OR;  Service:    • FOOT IRRIGATION, DEBRIDEMENT AND REPAIR Left 3/10/2018    Procedure: FOOT IRRIGATION, DEBRIDEMENT AND REPAIR;  Surgeon: Marco A Thompson DPM;  Location: [facility] OR;  Service: Podiatry   • FOOT WOUND CLOSURE Right 3/2/2017    Procedure: INCISION AND DRAINAGE, RIGHT FOOT;  Surgeon: Tung Rosenthal DPM;  Location: [facility] OR;  Service:    • HAMMER TOE REPAIR Left 2/15/2018    Procedure: HAMMERTOE CORRECTION LEFT SECOND, THIRD AND FOURTH TOES AND HALLUX INTERPHALANGEAL JOINT ARTHRODESIS LEFT FOOT (Micro Asnis, 4.0 & 5.0 Asnis)      (c-arm);  Surgeon: Marco A Thompson DPM;  Location: [facility] OR;  Service:    • HARDWARE REMOVAL  Left 3/5/2018    Procedure: ANKLE/FOOT HARDWARE REMOVAL;  Surgeon: Marco A Thompson DPM;  Location: [facility] OR;  Service:    • INCISION AND DRAINAGE LEG Left 3/5/2018    Procedure: INCISION AND DRAINAGE LOWER EXTREMITY;  Surgeon: Marco A Thompson DPM;  Location: [facility] OR;  Service:    • PLANTAR FASCIA RELEASE Left 11/21/2019    Procedure: PLANTAR PLANING LEFT FOOT AND ALL OTHER INDICATED PROCEDURES;  Surgeon: Marco A Thompson DPM;  Location: [facility] OR;  Service: Podiatry   • TOE AMPUTATION Right 2016   • TONSILECTOMY, ADENOIDECTOMY, BILATERAL MYRINGOTOMY AND TUBES      age 25         Family History   Problem Relation Age of Onset   • Diabetes Father    • Stroke Father    • No Known Problems Maternal Grandmother    • No Known Problems Maternal Grandfather    • No Known Problems Paternal Grandmother    • No Known Problems Paternal Grandfather    • No Known Problems Son    • No Known Problems Daughter    • No Known Problems Daughter          Social History     Socioeconomic History   • Marital status:    Tobacco Use   • Smoking status: Never Smoker   • Smokeless tobacco: Never Used   Vaping Use   • Vaping Use: Never used   Substance and Sexual Activity   • Alcohol use: No   • Drug use: No   • Sexual activity: Defer         Current Outpatient Medications   Medication Sig Dispense Refill   • ascorbic acid (VITAMIN C) 1000 MG tablet Take 1,000 mg by mouth Daily.     • aspirin (aspirin) 81 MG EC tablet Take 81 mg by mouth Daily.     • Blood Glucose Monitoring Suppl (ONE TOUCH ULTRA 2) w/Device kit      • Cholecalciferol 125 MCG (5000 UT) tablet Take 5,000 Units by mouth Daily.     • cyclobenzaprine (FLEXERIL) 10 MG tablet Take 1 tablet by mouth 3 (Three) Times a Day As Needed for Muscle Spasms. 21 tablet 0   • doxycycline (VIBRAMYCIN) 100 MG capsule Take 1 capsule by mouth 2 (Two) Times a Day. 20 capsule 0   • doxycycline (VIBRAMYCIN) 100 MG capsule Take 1 capsule by mouth 2 (Two) Times a Day. 20 capsule 0   •  "doxycycline (VIBRAMYCIN) 100 MG capsule Take 1 capsule by mouth 2 (Two) Times a Day. 20 capsule 0   • dronedarone (MULTAQ) 400 MG tablet Take 400 mg by mouth Every Night.     • fexofenadine (ALLEGRA) 180 MG tablet Take 1 tablet by mouth Daily.     • fluticasone (FLONASE) 50 MCG/ACT nasal spray 2 sprays into each nostril As Needed for Rhinitis.     • glimepiride (AMARYL) 4 MG tablet Take 4 mg by mouth.     • HYDROcodone-acetaminophen (NORCO) 7.5-325 MG per tablet Take 1 tablet by mouth Every 6 (Six) Hours As Needed for Moderate Pain . 12 tablet 0   • levoFLOXacin (LEVAQUIN) 500 MG tablet Take 1 tablet by mouth Daily. 10 tablet 0   • losartan (COZAAR) 25 MG tablet Take 25 mg by mouth Daily.     • magnesium oxide (MAG-OX) 400 MG tablet      • metFORMIN (GLUCOPHAGE) 1000 MG tablet Take 1 tablet by mouth 2 (Two) Times a Day With Meals.     • metoprolol succinate XL (TOPROL-XL) 25 MG 24 hr tablet Take 25 mg by mouth Every Night. 0.5 tablet     • ONE TOUCH ULTRA TEST test strip USE TO TEST BLOOD SUGAR THREE TIMES A DAY  1   • vitamin C (ASCORBIC ACID) 500 MG tablet Take 500 mg by mouth Every Night.     • vitamin E 200 UNIT capsule Take 200 Units by mouth Every Night.     • vitamin E 200 UNIT capsule Take 200 Units by mouth Daily.       No current facility-administered medications for this visit.           OBJECTIVE    /86   Pulse 103   Ht 193 cm (76\")   Wt (!) 159 kg (350 lb)   SpO2 97%   BMI 42.60 kg/m²     Review of Systems   Constitutional: Negative.    HENT: Negative.    Eyes: Negative.    Respiratory: Negative.    Cardiovascular: Negative.    Gastrointestinal: Negative.    Musculoskeletal: Negative.    Skin: Positive for wound. Negative for color change.   Psychiatric/Behavioral: Negative.              Physical Exam   Constitutional: He is oriented to person, place, and time. He appears well-developed. No distress.   HENT:   Head: Normocephalic and atraumatic.   Nose: Nose normal.   Eyes: Pupils are equal, " round, and reactive to light.   Pulmonary/Chest: Effort normal. No respiratory distress.   Musculoskeletal: Deformity present. No tenderness.   Neurological: He is alert and oriented to person, place, and time.   Skin: Skin is warm and dry. Capillary refill takes less than 2 seconds. No erythema.   Psychiatric: His behavior is normal. Judgment normal.   Vitals reviewed.    Lower Extremity Exam:     Cardiovascular:    DP/PT pulses palpable b/l    CFT brisk  to all digits b/l  Musculoskeletal:  Muscle strength is within normal limits  Rocker-bottom deformity left.  TMA right  No pain on palpation noted  b/l  Dermatological:   Open wound mid medial right foot.  Predebridement wound measures 0.8 x 0.5 x 0.4 cm.  Wound probes deep to bone.  No further purulent drainage.  No surrounding erythema  Neurological:   Protective sensation absent 10 out of 10 sites on the left and 5 out of 5 sites right            Procedures      ASSESSMENT AND PLAN    Diagnoses and all orders for this visit:    1. 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 Type 2 diabetes mellitus with peripheral neuropathy (HCC)      - Performed sharp excisional debridement of all devitalized tissue to the level of subcutaneous tissue with a #15 blade to healthy bleeding borders.  Posterior basal measures 1.0 x 0.7 x 0.4 cm.  Wound was flushed and then closed with 3-0 nylon.  Dressing applied.  Keep dressing clean, dry and intact.  Continue antibiotics.  - All the patients questions were answered.  - RTC 1 week, plan to recheck CRP and ESR next week             This document has been electronically signed by Marco A Thompson DPM on January 11, 2022 16:53 CST

## 2022-01-14 ENCOUNTER — OFFICE VISIT (OUTPATIENT)
Dept: WOUND CARE | Facility: HOSPITAL | Age: 62
End: 2022-01-14

## 2022-01-19 ENCOUNTER — OFFICE VISIT (OUTPATIENT)
Dept: PODIATRY | Facility: CLINIC | Age: 62
End: 2022-01-19

## 2022-01-19 VITALS
HEART RATE: 108 BPM | WEIGHT: 315 LBS | BODY MASS INDEX: 38.36 KG/M2 | SYSTOLIC BLOOD PRESSURE: 163 MMHG | HEIGHT: 76 IN | OXYGEN SATURATION: 97 % | DIASTOLIC BLOOD PRESSURE: 102 MMHG

## 2022-01-19 DIAGNOSIS — E11.42 TYPE 2 DIABETES MELLITUS WITH PERIPHERAL NEUROPATHY: ICD-10-CM

## 2022-01-19 DIAGNOSIS — R60.0 LOWER EXTREMITY EDEMA: ICD-10-CM

## 2022-01-19 DIAGNOSIS — L97.514 ULCER OF RIGHT FOOT WITH NECROSIS OF BONE: Primary | ICD-10-CM

## 2022-01-19 PROCEDURE — 29580 STRAPPING UNNA BOOT: CPT | Performed by: PODIATRIST

## 2022-01-19 RX ORDER — LEVOFLOXACIN 500 MG/1
500 TABLET, FILM COATED ORAL DAILY
Qty: 14 TABLET | Refills: 1 | Status: SHIPPED | OUTPATIENT
Start: 2022-01-19 | End: 2022-12-15

## 2022-01-19 NOTE — PROGRESS NOTES
Emre Lisa  1960  61 y.o. male   PCP: Rasheed Bravo MD - 7/19/2021  BS -  112 per patient      01/19/2022     Chief Complaint   Patient presents with   • Left Foot - Wound Check       History of Present Illness    Diabetic male presents to clinic for follow-up right foot wound.    Past Medical History:   Diagnosis Date   • Arthritis    • Atrial fibrillation (HCC)    • Diabetes mellitus (HCC)    • Diabetic foot ulcer associated with type 2 diabetes mellitus (HCC)     left great toe   • Hallux malleus of left foot    • Hammer toe    • Hypertension    • MI (myocardial infarction) (HCC)    • Neuropathy in diabetes (HCC)    • Onychomycosis    • Presence of biventricular cardiac pacemaker          Past Surgical History:   Procedure Laterality Date   • AMPUTATION DIGIT Right 3/5/2017    Procedure: RIGHT AMPUTATION TRANSMETATRASAL , RECESSION GASTROCNEMOUS;  Surgeon: Marco A Thompson DPM;  Location: [facility];  Service:    • CARDIAC DEFIBRILLATOR PLACEMENT  2010, 2016   • CARPAL TUNNEL RELEASE  2015    elbow and wrist left arm   • FOOT IRRIGATION, DEBRIDEMENT AND REPAIR Left 3/7/2018    Procedure: FOOT IRRIGATION, DEBRIDEMENT AND REPAIR;  Surgeon: Marco A Thompson DPM;  Location: [facility] OR;  Service:    • FOOT IRRIGATION, DEBRIDEMENT AND REPAIR Left 3/10/2018    Procedure: FOOT IRRIGATION, DEBRIDEMENT AND REPAIR;  Surgeon: Marco A Thompson DPM;  Location: [facility] OR;  Service: Podiatry   • FOOT WOUND CLOSURE Right 3/2/2017    Procedure: INCISION AND DRAINAGE, RIGHT FOOT;  Surgeon: Tung Rosenthal DPM;  Location: [facility] OR;  Service:    • HAMMER TOE REPAIR Left 2/15/2018    Procedure: HAMMERTOE CORRECTION LEFT SECOND, THIRD AND FOURTH TOES AND HALLUX INTERPHALANGEAL JOINT ARTHRODESIS LEFT FOOT (Micro Asnis, 4.0 & 5.0 Asnis)      (c-arm);  Surgeon: Marco A Thompson DPM;  Location: [facility] OR;  Service:    • HARDWARE REMOVAL Left 3/5/2018    Procedure: ANKLE/FOOT HARDWARE REMOVAL;  Surgeon: Marco A Thompson DPM;   Location: [facility] OR;  Service:    • INCISION AND DRAINAGE LEG Left 3/5/2018    Procedure: INCISION AND DRAINAGE LOWER EXTREMITY;  Surgeon: Marco A Thompson DPM;  Location: [facility] OR;  Service:    • PLANTAR FASCIA RELEASE Left 11/21/2019    Procedure: PLANTAR PLANING LEFT FOOT AND ALL OTHER INDICATED PROCEDURES;  Surgeon: Marco A Thompson DPM;  Location: [facility];  Service: Podiatry   • TOE AMPUTATION Right 2016   • TONSILECTOMY, ADENOIDECTOMY, BILATERAL MYRINGOTOMY AND TUBES      age 25         Family History   Problem Relation Age of Onset   • Diabetes Father    • Stroke Father    • No Known Problems Maternal Grandmother    • No Known Problems Maternal Grandfather    • No Known Problems Paternal Grandmother    • No Known Problems Paternal Grandfather    • No Known Problems Son    • No Known Problems Daughter    • No Known Problems Daughter          Social History     Socioeconomic History   • Marital status:    Tobacco Use   • Smoking status: Never Smoker   • Smokeless tobacco: Never Used   Vaping Use   • Vaping Use: Never used   Substance and Sexual Activity   • Alcohol use: No   • Drug use: No   • Sexual activity: Defer         Current Outpatient Medications   Medication Sig Dispense Refill   • ascorbic acid (VITAMIN C) 1000 MG tablet Take 1,000 mg by mouth Daily.     • aspirin (aspirin) 81 MG EC tablet Take 81 mg by mouth Daily.     • Blood Glucose Monitoring Suppl (ONE TOUCH ULTRA 2) w/Device kit      • Cholecalciferol 125 MCG (5000 UT) tablet Take 5,000 Units by mouth Daily.     • cyclobenzaprine (FLEXERIL) 10 MG tablet Take 1 tablet by mouth 3 (Three) Times a Day As Needed for Muscle Spasms. 21 tablet 0   • doxycycline (VIBRAMYCIN) 100 MG capsule Take 1 capsule by mouth 2 (Two) Times a Day. 20 capsule 0   • doxycycline (VIBRAMYCIN) 100 MG capsule Take 1 capsule by mouth 2 (Two) Times a Day. 20 capsule 0   • doxycycline (VIBRAMYCIN) 100 MG capsule Take 1 capsule by mouth 2 (Two) Times a Day. 20 capsule  "0   • dronedarone (MULTAQ) 400 MG tablet Take 400 mg by mouth Every Night.     • fexofenadine (ALLEGRA) 180 MG tablet Take 1 tablet by mouth Daily.     • fluticasone (FLONASE) 50 MCG/ACT nasal spray 2 sprays into each nostril As Needed for Rhinitis.     • glimepiride (AMARYL) 4 MG tablet Take 4 mg by mouth.     • HYDROcodone-acetaminophen (NORCO) 7.5-325 MG per tablet Take 1 tablet by mouth Every 6 (Six) Hours As Needed for Moderate Pain . 12 tablet 0   • levoFLOXacin (LEVAQUIN) 500 MG tablet Take 1 tablet by mouth Daily. 10 tablet 0   • losartan (COZAAR) 25 MG tablet Take 25 mg by mouth Daily.     • magnesium oxide (MAG-OX) 400 MG tablet      • metFORMIN (GLUCOPHAGE) 1000 MG tablet Take 1 tablet by mouth 2 (Two) Times a Day With Meals.     • metoprolol succinate XL (TOPROL-XL) 25 MG 24 hr tablet Take 25 mg by mouth Every Night. 0.5 tablet     • ONE TOUCH ULTRA TEST test strip USE TO TEST BLOOD SUGAR THREE TIMES A DAY  1   • vitamin C (ASCORBIC ACID) 500 MG tablet Take 500 mg by mouth Every Night.     • vitamin E 200 UNIT capsule Take 200 Units by mouth Every Night.     • vitamin E 200 UNIT capsule Take 200 Units by mouth Daily.     • levoFLOXacin (LEVAQUIN) 500 MG tablet Take 1 tablet by mouth Daily. 14 tablet 1     No current facility-administered medications for this visit.           OBJECTIVE    BP (!) 163/102   Pulse 108   Ht 193 cm (76\")   Wt (!) 159 kg (350 lb)   SpO2 97%   BMI 42.60 kg/m²     Review of Systems   Constitutional: Negative.    HENT: Negative.    Eyes: Negative.    Respiratory: Negative.    Cardiovascular: Negative.    Gastrointestinal: Negative.    Musculoskeletal: Negative.    Skin: Positive for wound. Negative for color change.   Psychiatric/Behavioral: Negative.              Physical Exam   Constitutional: He is oriented to person, place, and time. He appears well-developed. No distress.   HENT:   Head: Normocephalic and atraumatic.   Nose: Nose normal.   Eyes: Pupils are equal, round, " and reactive to light.   Pulmonary/Chest: Effort normal. No respiratory distress.   Musculoskeletal: Deformity present. No tenderness.   Neurological: He is alert and oriented to person, place, and time.   Skin: Skin is warm and dry. Capillary refill takes less than 2 seconds. No erythema.   Psychiatric: His behavior is normal. Judgment normal.   Vitals reviewed.    Lower Extremity Exam:     Cardiovascular:    DP/PT pulses palpable b/l    CFT brisk  to all digits b/l  Edema right lower extremity  Musculoskeletal:  Muscle strength is within normal limits  Rocker-bottom deformity left.  TMA right  No pain on palpation noted  b/l  Dermatological:   Wound to medial right foot is well approximated.  No signs of infection.  Neurological:   Protective sensation absent 10 out of 10 sites on the left and 5 out of 5 sites right            Procedures      ASSESSMENT AND PLAN    Diagnoses and all orders for this visit:    1. 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 Type 2 diabetes mellitus with peripheral neuropathy (HCC)    3. Lower extremity edema    Other orders  -     levoFLOXacin (LEVAQUIN) 500 MG tablet; Take 1 tablet by mouth Daily.  Dispense: 14 tablet; Refill: 1      - Unna boot applied for edema control.  Keep clean, dry and intact.  - Continue Levaquin  - All the patients questions were answered.  - RTC 1 week, plan to recheck CRP and ESR next week             This document has been electronically signed by Marco A Thompson DPM on January 20, 2022 12:54 CST

## 2022-01-26 ENCOUNTER — LAB (OUTPATIENT)
Dept: LAB | Facility: HOSPITAL | Age: 62
End: 2022-01-26

## 2022-01-26 ENCOUNTER — OFFICE VISIT (OUTPATIENT)
Dept: PODIATRY | Facility: CLINIC | Age: 62
End: 2022-01-26

## 2022-01-26 VITALS — BODY MASS INDEX: 38.36 KG/M2 | HEART RATE: 74 BPM | HEIGHT: 76 IN | OXYGEN SATURATION: 99 % | WEIGHT: 315 LBS

## 2022-01-26 DIAGNOSIS — E11.42 TYPE 2 DIABETES MELLITUS WITH PERIPHERAL NEUROPATHY: ICD-10-CM

## 2022-01-26 DIAGNOSIS — L97.514 ULCER OF RIGHT FOOT WITH NECROSIS OF BONE: ICD-10-CM

## 2022-01-26 DIAGNOSIS — L97.514 ULCER OF RIGHT FOOT WITH NECROSIS OF BONE: Primary | ICD-10-CM

## 2022-01-26 DIAGNOSIS — R60.0 LOWER EXTREMITY EDEMA: ICD-10-CM

## 2022-01-26 PROCEDURE — 29580 STRAPPING UNNA BOOT: CPT | Performed by: PODIATRIST

## 2022-01-26 PROCEDURE — 86140 C-REACTIVE PROTEIN: CPT

## 2022-01-26 PROCEDURE — 36415 COLL VENOUS BLD VENIPUNCTURE: CPT

## 2022-01-26 PROCEDURE — 85652 RBC SED RATE AUTOMATED: CPT

## 2022-01-26 NOTE — PROGRESS NOTES
Emre Bergeron Sloan  1960  61 y.o. male   PCP: Rasheed Bravo MD - 7/19/2021  BS -  115 per patient      01/26/2022     Chief Complaint   Patient presents with   • Right Foot - Wound Check, Follow-up       History of Present Illness    Diabetic male presents to clinic for follow-up right foot wound.    Past Medical History:   Diagnosis Date   • Arthritis    • Atrial fibrillation (HCC)    • Diabetes mellitus (HCC)    • Diabetic foot ulcer associated with type 2 diabetes mellitus (HCC)     left great toe   • Hallux malleus of left foot    • Hammer toe    • Hypertension    • MI (myocardial infarction) (HCC)    • Neuropathy in diabetes (HCC)    • Onychomycosis    • Presence of biventricular cardiac pacemaker          Past Surgical History:   Procedure Laterality Date   • AMPUTATION DIGIT Right 3/5/2017    Procedure: RIGHT AMPUTATION TRANSMETATRASAL , RECESSION GASTROCNEMOUS;  Surgeon: Marco A Thompson DPM;  Location: [facility];  Service:    • CARDIAC DEFIBRILLATOR PLACEMENT  2010, 2016   • CARPAL TUNNEL RELEASE  2015    elbow and wrist left arm   • FOOT IRRIGATION, DEBRIDEMENT AND REPAIR Left 3/7/2018    Procedure: FOOT IRRIGATION, DEBRIDEMENT AND REPAIR;  Surgeon: Marco A Thompson DPM;  Location: [facility] OR;  Service:    • FOOT IRRIGATION, DEBRIDEMENT AND REPAIR Left 3/10/2018    Procedure: FOOT IRRIGATION, DEBRIDEMENT AND REPAIR;  Surgeon: Marco A Thompson DPM;  Location: [facility] OR;  Service: Podiatry   • FOOT WOUND CLOSURE Right 3/2/2017    Procedure: INCISION AND DRAINAGE, RIGHT FOOT;  Surgeon: Tung Rosenthal DPM;  Location: [facility] OR;  Service:    • HAMMER TOE REPAIR Left 2/15/2018    Procedure: HAMMERTOE CORRECTION LEFT SECOND, THIRD AND FOURTH TOES AND HALLUX INTERPHALANGEAL JOINT ARTHRODESIS LEFT FOOT (Micro Asnis, 4.0 & 5.0 Asnis)      (c-arm);  Surgeon: Marco A Thompson DPM;  Location: [facility] OR;  Service:    • HARDWARE REMOVAL Left 3/5/2018    Procedure: ANKLE/FOOT HARDWARE REMOVAL;  Surgeon: Marco A Thompson  CHRISTIAN;  Location: [facility] OR;  Service:    • INCISION AND DRAINAGE LEG Left 3/5/2018    Procedure: INCISION AND DRAINAGE LOWER EXTREMITY;  Surgeon: Marco A Thompson DPM;  Location: [facility] OR;  Service:    • PLANTAR FASCIA RELEASE Left 11/21/2019    Procedure: PLANTAR PLANING LEFT FOOT AND ALL OTHER INDICATED PROCEDURES;  Surgeon: Marco A Thompson DPM;  Location: [facility];  Service: Podiatry   • TOE AMPUTATION Right 2016   • TONSILECTOMY, ADENOIDECTOMY, BILATERAL MYRINGOTOMY AND TUBES      age 25         Family History   Problem Relation Age of Onset   • Diabetes Father    • Stroke Father    • No Known Problems Maternal Grandmother    • No Known Problems Maternal Grandfather    • No Known Problems Paternal Grandmother    • No Known Problems Paternal Grandfather    • No Known Problems Son    • No Known Problems Daughter    • No Known Problems Daughter          Social History     Socioeconomic History   • Marital status:    Tobacco Use   • Smoking status: Never Smoker   • Smokeless tobacco: Never Used   Vaping Use   • Vaping Use: Never used   Substance and Sexual Activity   • Alcohol use: No   • Drug use: No   • Sexual activity: Defer         Current Outpatient Medications   Medication Sig Dispense Refill   • ascorbic acid (VITAMIN C) 1000 MG tablet Take 1,000 mg by mouth Daily.     • aspirin (aspirin) 81 MG EC tablet Take 81 mg by mouth Daily.     • Blood Glucose Monitoring Suppl (ONE TOUCH ULTRA 2) w/Device kit      • Cholecalciferol 125 MCG (5000 UT) tablet Take 5,000 Units by mouth Daily.     • cyclobenzaprine (FLEXERIL) 10 MG tablet Take 1 tablet by mouth 3 (Three) Times a Day As Needed for Muscle Spasms. 21 tablet 0   • doxycycline (VIBRAMYCIN) 100 MG capsule Take 1 capsule by mouth 2 (Two) Times a Day. 20 capsule 0   • doxycycline (VIBRAMYCIN) 100 MG capsule Take 1 capsule by mouth 2 (Two) Times a Day. 20 capsule 0   • doxycycline (VIBRAMYCIN) 100 MG capsule Take 1 capsule by mouth 2 (Two) Times a Day. 20  "capsule 0   • dronedarone (MULTAQ) 400 MG tablet Take 400 mg by mouth Every Night.     • fexofenadine (ALLEGRA) 180 MG tablet Take 1 tablet by mouth Daily.     • fluticasone (FLONASE) 50 MCG/ACT nasal spray 2 sprays into each nostril As Needed for Rhinitis.     • glimepiride (AMARYL) 4 MG tablet Take 4 mg by mouth.     • HYDROcodone-acetaminophen (NORCO) 7.5-325 MG per tablet Take 1 tablet by mouth Every 6 (Six) Hours As Needed for Moderate Pain . 12 tablet 0   • levoFLOXacin (LEVAQUIN) 500 MG tablet Take 1 tablet by mouth Daily. 10 tablet 0   • levoFLOXacin (LEVAQUIN) 500 MG tablet Take 1 tablet by mouth Daily. 14 tablet 1   • losartan (COZAAR) 25 MG tablet Take 25 mg by mouth Daily.     • magnesium oxide (MAG-OX) 400 MG tablet      • metFORMIN (GLUCOPHAGE) 1000 MG tablet Take 1 tablet by mouth 2 (Two) Times a Day With Meals.     • metoprolol succinate XL (TOPROL-XL) 25 MG 24 hr tablet Take 25 mg by mouth Every Night. 0.5 tablet     • ONE TOUCH ULTRA TEST test strip USE TO TEST BLOOD SUGAR THREE TIMES A DAY  1   • vitamin C (ASCORBIC ACID) 500 MG tablet Take 500 mg by mouth Every Night.     • vitamin E 200 UNIT capsule Take 200 Units by mouth Every Night.     • vitamin E 200 UNIT capsule Take 200 Units by mouth Daily.       No current facility-administered medications for this visit.           OBJECTIVE    Pulse 74   Ht 193 cm (76\")   Wt (!) 159 kg (350 lb)   SpO2 99%   BMI 42.60 kg/m²     Review of Systems   Constitutional: Negative.    HENT: Negative.    Eyes: Negative.    Respiratory: Negative.    Cardiovascular: Negative.    Gastrointestinal: Negative.    Musculoskeletal: Negative.    Skin: Positive for wound. Negative for color change.   Psychiatric/Behavioral: Negative.              Physical Exam   Constitutional: He is oriented to person, place, and time. He appears well-developed. No distress.   HENT:   Head: Normocephalic and atraumatic.   Nose: Nose normal.   Eyes: Pupils are equal, round, and " reactive to light.   Pulmonary/Chest: Effort normal. No respiratory distress.   Musculoskeletal: Deformity present. No tenderness.   Neurological: He is alert and oriented to person, place, and time.   Skin: Skin is warm and dry. Capillary refill takes less than 2 seconds. No erythema.   Psychiatric: His behavior is normal. Judgment normal.   Vitals reviewed.    Lower Extremity Exam:     Cardiovascular:    DP/PT pulses palpable b/l    CFT brisk  to all digits b/l  Edema right lower extremity  Musculoskeletal:  Muscle strength is within normal limits  Rocker-bottom deformity left.  TMA right  No pain on palpation noted  b/l  Dermatological:    small wound to distal medial right foot.  Sutures intact.  No signs of infection.  Neurological:   Protective sensation absent 10 out of 10 sites on the left and 5 out of 5 sites right            Procedures      ASSESSMENT AND PLAN    Diagnoses and all orders for this visit:    1. 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 Lower extremity edema    3. Type 2 diabetes mellitus with peripheral neuropathy (HCC)      - Sutures removed.  Reapplied Unna boot for edema control  -ESR and CRP ordered  - All the patients questions were answered.  - RTC 1 week             This document has been electronically signed by Marco A Thompson DPM on January 26, 2022 13:45 CST

## 2022-01-27 LAB
CRP SERPL-MCNC: <0.3 MG/DL (ref 0–0.5)
ERYTHROCYTE [SEDIMENTATION RATE] IN BLOOD: 11 MM/HR (ref 0–20)

## 2022-02-02 ENCOUNTER — OFFICE VISIT (OUTPATIENT)
Dept: PODIATRY | Facility: CLINIC | Age: 62
End: 2022-02-02

## 2022-02-02 VITALS — OXYGEN SATURATION: 99 % | HEIGHT: 76 IN | WEIGHT: 315 LBS | HEART RATE: 112 BPM | BODY MASS INDEX: 38.36 KG/M2

## 2022-02-02 DIAGNOSIS — L97.514 ULCER OF RIGHT FOOT WITH NECROSIS OF BONE: Primary | ICD-10-CM

## 2022-02-02 DIAGNOSIS — E11.42 TYPE 2 DIABETES MELLITUS WITH PERIPHERAL NEUROPATHY: ICD-10-CM

## 2022-02-02 DIAGNOSIS — R60.0 LOWER EXTREMITY EDEMA: ICD-10-CM

## 2022-02-02 PROCEDURE — 29580 STRAPPING UNNA BOOT: CPT | Performed by: PODIATRIST

## 2022-02-02 RX ORDER — LEVOFLOXACIN 500 MG/1
1 TABLET, FILM COATED ORAL DAILY
COMMUNITY
Start: 2022-01-10 | End: 2022-02-02

## 2022-02-02 RX ORDER — FLUTICASONE PROPIONATE 50 MCG
1 SPRAY, SUSPENSION (ML) NASAL DAILY
COMMUNITY
Start: 2022-02-02 | End: 2022-12-15

## 2022-02-02 RX ORDER — LEVOFLOXACIN 500 MG/1
500 TABLET, FILM COATED ORAL DAILY
Qty: 14 TABLET | Refills: 0 | Status: SHIPPED | OUTPATIENT
Start: 2022-02-02 | End: 2022-12-15 | Stop reason: SDUPTHER

## 2022-02-02 NOTE — PROGRESS NOTES
Emre Bergeron Sloan  1960  61 y.o. male   PCP: Rasheed Bravo MD - 7/19/2021  BS -  108 per patient    Patient return to clinic for a recheck of skin ulcer on right foot. Unna boot change.    02/02/2022     Chief Complaint   Patient presents with   • Right Foot - Follow-up       History of Present Illness    Diabetic male presents to clinic for follow-up right foot wound.    Past Medical History:   Diagnosis Date   • Arthritis    • Atrial fibrillation (HCC)    • Diabetes mellitus (HCC)    • Diabetic foot ulcer associated with type 2 diabetes mellitus (HCC)     left great toe   • Hallux malleus of left foot    • Hammer toe    • Hypertension    • MI (myocardial infarction) (HCC)    • Neuropathy in diabetes (HCC)    • Onychomycosis    • Presence of biventricular cardiac pacemaker          Past Surgical History:   Procedure Laterality Date   • AMPUTATION DIGIT Right 3/5/2017    Procedure: RIGHT AMPUTATION TRANSMETATRASAL , RECESSION GASTROCNEMOUS;  Surgeon: Marco A Thompson DPM;  Location: [facility];  Service:    • CARDIAC DEFIBRILLATOR PLACEMENT  2010, 2016   • CARPAL TUNNEL RELEASE  2015    elbow and wrist left arm   • FOOT IRRIGATION, DEBRIDEMENT AND REPAIR Left 3/7/2018    Procedure: FOOT IRRIGATION, DEBRIDEMENT AND REPAIR;  Surgeon: Marco A Thompson DPM;  Location: [facility];  Service:    • FOOT IRRIGATION, DEBRIDEMENT AND REPAIR Left 3/10/2018    Procedure: FOOT IRRIGATION, DEBRIDEMENT AND REPAIR;  Surgeon: Marco A Thompson DPM;  Location: [facility] OR;  Service: Podiatry   • FOOT WOUND CLOSURE Right 3/2/2017    Procedure: INCISION AND DRAINAGE, RIGHT FOOT;  Surgeon: Tung Rosenthal DPM;  Location: [facility] OR;  Service:    • HAMMER TOE REPAIR Left 2/15/2018    Procedure: HAMMERTOE CORRECTION LEFT SECOND, THIRD AND FOURTH TOES AND HALLUX INTERPHALANGEAL JOINT ARTHRODESIS LEFT FOOT (Micro Asnis, 4.0 & 5.0 Asnis)      (c-arm);  Surgeon: Marco A Thompson DPM;  Location: [facility] OR;  Service:    • HARDWARE REMOVAL Left  3/5/2018    Procedure: ANKLE/FOOT HARDWARE REMOVAL;  Surgeon: Marco A Thompson DPM;  Location: [facility] OR;  Service:    • INCISION AND DRAINAGE LEG Left 3/5/2018    Procedure: INCISION AND DRAINAGE LOWER EXTREMITY;  Surgeon: Marco A Thompson DPM;  Location: [facility] OR;  Service:    • PLANTAR FASCIA RELEASE Left 11/21/2019    Procedure: PLANTAR PLANING LEFT FOOT AND ALL OTHER INDICATED PROCEDURES;  Surgeon: Marco A Thompson DPM;  Location: [facility] OR;  Service: Podiatry   • TOE AMPUTATION Right 2016   • TONSILECTOMY, ADENOIDECTOMY, BILATERAL MYRINGOTOMY AND TUBES      age 25         Family History   Problem Relation Age of Onset   • Diabetes Father    • Stroke Father    • No Known Problems Maternal Grandmother    • No Known Problems Maternal Grandfather    • No Known Problems Paternal Grandmother    • No Known Problems Paternal Grandfather    • No Known Problems Son    • No Known Problems Daughter    • No Known Problems Daughter          Social History     Socioeconomic History   • Marital status:    Tobacco Use   • Smoking status: Never Smoker   • Smokeless tobacco: Never Used   Vaping Use   • Vaping Use: Never used   Substance and Sexual Activity   • Alcohol use: No   • Drug use: No   • Sexual activity: Defer         Current Outpatient Medications   Medication Sig Dispense Refill   • ascorbic acid (VITAMIN C) 1000 MG tablet Take 1,000 mg by mouth Daily.     • aspirin (aspirin) 81 MG EC tablet Take 81 mg by mouth Daily.     • Blood Glucose Monitoring Suppl (ONE TOUCH ULTRA 2) w/Device kit      • Cholecalciferol 125 MCG (5000 UT) tablet Take 5,000 Units by mouth Daily.     • cyclobenzaprine (FLEXERIL) 10 MG tablet Take 1 tablet by mouth 3 (Three) Times a Day As Needed for Muscle Spasms. 21 tablet 0   • doxycycline (VIBRAMYCIN) 100 MG capsule Take 1 capsule by mouth 2 (Two) Times a Day. 20 capsule 0   • doxycycline (VIBRAMYCIN) 100 MG capsule Take 1 capsule by mouth 2 (Two) Times a Day. 20 capsule 0   • doxycycline  "(VIBRAMYCIN) 100 MG capsule Take 1 capsule by mouth 2 (Two) Times a Day. 20 capsule 0   • dronedarone (MULTAQ) 400 MG tablet Take 400 mg by mouth Every Night.     • fexofenadine (ALLEGRA) 180 MG tablet Take 1 tablet by mouth Daily.     • fluticasone (FLONASE) 50 MCG/ACT nasal spray 2 sprays into each nostril As Needed for Rhinitis.     • fluticasone (FLONASE) 50 MCG/ACT nasal spray 1 spray into the nostril(s) as directed by provider Daily.     • glimepiride (AMARYL) 4 MG tablet Take 4 mg by mouth.     • HYDROcodone-acetaminophen (NORCO) 7.5-325 MG per tablet Take 1 tablet by mouth Every 6 (Six) Hours As Needed for Moderate Pain . 12 tablet 0   • levoFLOXacin (LEVAQUIN) 500 MG tablet Take 1 tablet by mouth Daily. 10 tablet 0   • levoFLOXacin (LEVAQUIN) 500 MG tablet Take 1 tablet by mouth Daily. 14 tablet 1   • losartan (COZAAR) 25 MG tablet Take 25 mg by mouth Daily.     • magnesium oxide (MAG-OX) 400 MG tablet      • metFORMIN (GLUCOPHAGE) 1000 MG tablet Take 1 tablet by mouth 2 (Two) Times a Day With Meals.     • metoprolol succinate XL (TOPROL-XL) 25 MG 24 hr tablet Take 25 mg by mouth Every Night. 0.5 tablet     • ONE TOUCH ULTRA TEST test strip USE TO TEST BLOOD SUGAR THREE TIMES A DAY  1   • vitamin C (ASCORBIC ACID) 500 MG tablet Take 500 mg by mouth Every Night.     • vitamin E 200 UNIT capsule Take 200 Units by mouth Every Night.     • vitamin E 200 UNIT capsule Take 200 Units by mouth Daily.     • levoFLOXacin (LEVAQUIN) 500 MG tablet Take 1 tablet by mouth Daily. 14 tablet 0     No current facility-administered medications for this visit.           OBJECTIVE    Pulse 112   Ht 193 cm (76\")   Wt (!) 159 kg (350 lb)   SpO2 99%   BMI 42.60 kg/m²     Review of Systems   Constitutional: Negative.    HENT: Negative.    Eyes: Negative.    Respiratory: Negative.    Cardiovascular: Negative.    Gastrointestinal: Negative.    Musculoskeletal: Negative.    Skin: Positive for wound. Negative for color change. "   Psychiatric/Behavioral: Negative.              Physical Exam   Constitutional: He is oriented to person, place, and time. He appears well-developed. No distress.   HENT:   Head: Normocephalic and atraumatic.   Nose: Nose normal.   Eyes: Pupils are equal, round, and reactive to light.   Pulmonary/Chest: Effort normal. No respiratory distress.   Musculoskeletal: Deformity present. No tenderness.   Neurological: He is alert and oriented to person, place, and time.   Skin: Skin is warm and dry. Capillary refill takes less than 2 seconds. No erythema.   Psychiatric: His behavior is normal. Judgment normal.   Vitals reviewed.    Lower Extremity Exam:     Cardiovascular:    DP/PT pulses palpable b/l    CFT brisk  to all digits b/l  Edema right lower extremity  Musculoskeletal:  Muscle strength is within normal limits  Rocker-bottom deformity left.  TMA right  No pain on palpation noted  b/l  Dermatological:    small wound to distal medial right foot.  Sutures intact.  No signs of infection.  Neurological:   Protective sensation absent 10 out of 10 sites on the left and 5 out of 5 sites right            Procedures      ASSESSMENT AND PLAN    Diagnoses and all orders for this visit:    1. 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 Lower extremity edema    3. Type 2 diabetes mellitus with peripheral neuropathy (HCC)    Other orders  -     levoFLOXacin (LEVAQUIN) 500 MG tablet; Take 1 tablet by mouth Daily.  Dispense: 14 tablet; Refill: 0      - wound nearly healed. Reapplied Unna boot for edema control  - ESR and CRP wnl  - continue levaquin  - All the patients questions were answered.  - RTC 1 week             This document has been electronically signed by Marco A Thompson DPM on February 4, 2022 11:47 CST

## 2022-02-09 ENCOUNTER — OFFICE VISIT (OUTPATIENT)
Dept: PODIATRY | Facility: CLINIC | Age: 62
End: 2022-02-09

## 2022-02-09 VITALS
DIASTOLIC BLOOD PRESSURE: 90 MMHG | BODY MASS INDEX: 38.36 KG/M2 | OXYGEN SATURATION: 98 % | HEIGHT: 76 IN | WEIGHT: 315 LBS | HEART RATE: 103 BPM | SYSTOLIC BLOOD PRESSURE: 150 MMHG

## 2022-02-09 DIAGNOSIS — E11.42 TYPE 2 DIABETES MELLITUS WITH PERIPHERAL NEUROPATHY: ICD-10-CM

## 2022-02-09 DIAGNOSIS — R60.0 LOWER EXTREMITY EDEMA: ICD-10-CM

## 2022-02-09 DIAGNOSIS — L97.514 ULCER OF RIGHT FOOT WITH NECROSIS OF BONE: Primary | ICD-10-CM

## 2022-02-09 PROCEDURE — 99212 OFFICE O/P EST SF 10 MIN: CPT | Performed by: PODIATRIST

## 2022-02-09 NOTE — PROGRESS NOTES
Emre Bergeron Sloan  1960  61 y.o. male   PCP: Rasheed Bravo MD - 7/19/2021  BS -  115 per patient    Patient return to clinic for a recheck of skin ulcer on right foot. Unna boot change.    02/09/2022     Chief Complaint   Patient presents with   • Right Foot - Skin Ulcer       History of Present Illness    Diabetic male presents to clinic for follow-up right foot wound.    Past Medical History:   Diagnosis Date   • Arthritis    • Atrial fibrillation (HCC)    • Diabetes mellitus (HCC)    • Diabetic foot ulcer associated with type 2 diabetes mellitus (HCC)     left great toe   • Hallux malleus of left foot    • Hammer toe    • Hypertension    • MI (myocardial infarction) (HCC)    • Neuropathy in diabetes (HCC)    • Onychomycosis    • Presence of biventricular cardiac pacemaker          Past Surgical History:   Procedure Laterality Date   • AMPUTATION DIGIT Right 3/5/2017    Procedure: RIGHT AMPUTATION TRANSMETATRASAL , RECESSION GASTROCNEMOUS;  Surgeon: Marco A Thompson DPM;  Location: [facility];  Service:    • CARDIAC DEFIBRILLATOR PLACEMENT  2010, 2016   • CARPAL TUNNEL RELEASE  2015    elbow and wrist left arm   • FOOT IRRIGATION, DEBRIDEMENT AND REPAIR Left 3/7/2018    Procedure: FOOT IRRIGATION, DEBRIDEMENT AND REPAIR;  Surgeon: Marco A Thompson DPM;  Location: [facility];  Service:    • FOOT IRRIGATION, DEBRIDEMENT AND REPAIR Left 3/10/2018    Procedure: FOOT IRRIGATION, DEBRIDEMENT AND REPAIR;  Surgeon: Marco A Thompson DPM;  Location: [facility] OR;  Service: Podiatry   • FOOT WOUND CLOSURE Right 3/2/2017    Procedure: INCISION AND DRAINAGE, RIGHT FOOT;  Surgeon: Tung Rosenthal DPM;  Location: [facility] OR;  Service:    • HAMMER TOE REPAIR Left 2/15/2018    Procedure: HAMMERTOE CORRECTION LEFT SECOND, THIRD AND FOURTH TOES AND HALLUX INTERPHALANGEAL JOINT ARTHRODESIS LEFT FOOT (Micro Asnis, 4.0 & 5.0 Asnis)      (c-arm);  Surgeon: Marco A Thompson DPM;  Location: [facility] OR;  Service:    • HARDWARE REMOVAL Left  3/5/2018    Procedure: ANKLE/FOOT HARDWARE REMOVAL;  Surgeon: Marco A Thompson DPM;  Location: [facility] OR;  Service:    • INCISION AND DRAINAGE LEG Left 3/5/2018    Procedure: INCISION AND DRAINAGE LOWER EXTREMITY;  Surgeon: Marco A Thompson DPM;  Location: [facility] OR;  Service:    • PLANTAR FASCIA RELEASE Left 11/21/2019    Procedure: PLANTAR PLANING LEFT FOOT AND ALL OTHER INDICATED PROCEDURES;  Surgeon: Marco A Thompson DPM;  Location: [facility];  Service: Podiatry   • TOE AMPUTATION Right 2016   • TONSILECTOMY, ADENOIDECTOMY, BILATERAL MYRINGOTOMY AND TUBES      age 25         Family History   Problem Relation Age of Onset   • Diabetes Father    • Stroke Father    • No Known Problems Maternal Grandmother    • No Known Problems Maternal Grandfather    • No Known Problems Paternal Grandmother    • No Known Problems Paternal Grandfather    • No Known Problems Son    • No Known Problems Daughter    • No Known Problems Daughter          Social History     Socioeconomic History   • Marital status:    Tobacco Use   • Smoking status: Never Smoker   • Smokeless tobacco: Never Used   Vaping Use   • Vaping Use: Never used   Substance and Sexual Activity   • Alcohol use: No   • Drug use: No   • Sexual activity: Defer         Current Outpatient Medications   Medication Sig Dispense Refill   • ascorbic acid (VITAMIN C) 1000 MG tablet Take 1,000 mg by mouth Daily.     • aspirin (aspirin) 81 MG EC tablet Take 81 mg by mouth Daily.     • Blood Glucose Monitoring Suppl (ONE TOUCH ULTRA 2) w/Device kit      • Blood Glucose Monitoring Suppl kit TEST BS THREE TIMES DAILY FILL WITH FORMULARY DX E11.42     • Cholecalciferol 125 MCG (5000 UT) tablet Take 5,000 Units by mouth Daily.     • cyclobenzaprine (FLEXERIL) 10 MG tablet Take 1 tablet by mouth 3 (Three) Times a Day As Needed for Muscle Spasms. 21 tablet 0   • doxycycline (VIBRAMYCIN) 100 MG capsule Take 1 capsule by mouth 2 (Two) Times a Day. 20 capsule 0   • doxycycline  "(VIBRAMYCIN) 100 MG capsule Take 1 capsule by mouth 2 (Two) Times a Day. 20 capsule 0   • doxycycline (VIBRAMYCIN) 100 MG capsule Take 1 capsule by mouth 2 (Two) Times a Day. 20 capsule 0   • dronedarone (MULTAQ) 400 MG tablet Take 400 mg by mouth Every Night.     • fexofenadine (ALLEGRA) 180 MG tablet Take 1 tablet by mouth Daily.     • fluticasone (FLONASE) 50 MCG/ACT nasal spray 2 sprays into each nostril As Needed for Rhinitis.     • fluticasone (FLONASE) 50 MCG/ACT nasal spray 1 spray into the nostril(s) as directed by provider Daily.     • glimepiride (AMARYL) 4 MG tablet Take 4 mg by mouth.     • HYDROcodone-acetaminophen (NORCO) 7.5-325 MG per tablet Take 1 tablet by mouth Every 6 (Six) Hours As Needed for Moderate Pain . 12 tablet 0   • levoFLOXacin (LEVAQUIN) 500 MG tablet Take 1 tablet by mouth Daily. 10 tablet 0   • levoFLOXacin (LEVAQUIN) 500 MG tablet Take 1 tablet by mouth Daily. 14 tablet 1   • levoFLOXacin (LEVAQUIN) 500 MG tablet Take 1 tablet by mouth Daily. 14 tablet 0   • losartan (COZAAR) 25 MG tablet Take 25 mg by mouth Daily.     • magnesium oxide (MAG-OX) 400 MG tablet      • metFORMIN (GLUCOPHAGE) 1000 MG tablet Take 1 tablet by mouth 2 (Two) Times a Day With Meals.     • metoprolol succinate XL (TOPROL-XL) 25 MG 24 hr tablet Take 25 mg by mouth Every Night. 0.5 tablet     • ONE TOUCH ULTRA TEST test strip USE TO TEST BLOOD SUGAR THREE TIMES A DAY  1   • vitamin C (ASCORBIC ACID) 500 MG tablet Take 500 mg by mouth Every Night.     • vitamin E 200 UNIT capsule Take 200 Units by mouth Every Night.     • vitamin E 200 UNIT capsule Take 200 Units by mouth Daily.       No current facility-administered medications for this visit.           OBJECTIVE    /90   Pulse 103   Ht 193 cm (76\")   Wt (!) 159 kg (350 lb)   SpO2 98%   BMI 42.60 kg/m²     Review of Systems   Constitutional: Negative.    HENT: Negative.    Eyes: Negative.    Respiratory: Negative.    Cardiovascular: Negative.  "   Gastrointestinal: Negative.    Musculoskeletal: Negative.    Skin: Negative for color change and wound.   Psychiatric/Behavioral: Negative.              Physical Exam   Constitutional: He is oriented to person, place, and time. He appears well-developed. No distress.   HENT:   Head: Normocephalic and atraumatic.   Nose: Nose normal.   Eyes: Pupils are equal, round, and reactive to light.   Pulmonary/Chest: Effort normal. No respiratory distress.   Musculoskeletal: Deformity present. No tenderness.   Neurological: He is alert and oriented to person, place, and time.   Skin: Skin is warm and dry. Capillary refill takes less than 2 seconds. No erythema.   Psychiatric: His behavior is normal. Judgment normal.   Vitals reviewed.    Lower Extremity Exam:     Cardiovascular:    DP/PT pulses palpable b/l    CFT brisk  to all digits b/l  Edema right lower extremity  Musculoskeletal:  Muscle strength is within normal limits  Rocker-bottom deformity left.  TMA right  No pain on palpation noted  b/l  Dermatological:   No open wounds  Neurological:   Protective sensation absent 10 out of 10 sites on the left and 5 out of 5 sites right            Procedures      ASSESSMENT AND PLAN    Diagnoses and all orders for this visit:    1. 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 Lower extremity edema    3. Type 2 diabetes mellitus with peripheral neuropathy (HCC)      -Wound healed. Suture removed. Transition back to regular shoe gear.  - All the patients questions were answered.  - RTC for routine foot care          This document has been electronically signed by Marco A Thompson DPM on February 11, 2022 13:06 CST

## 2022-04-13 ENCOUNTER — OFFICE VISIT (OUTPATIENT)
Dept: PODIATRY | Facility: CLINIC | Age: 62
End: 2022-04-13

## 2022-04-13 VITALS — HEART RATE: 73 BPM | OXYGEN SATURATION: 98 % | HEIGHT: 76 IN | BODY MASS INDEX: 38.36 KG/M2 | WEIGHT: 315 LBS

## 2022-04-13 DIAGNOSIS — L97.514 ULCER OF RIGHT FOOT WITH NECROSIS OF BONE: Primary | ICD-10-CM

## 2022-04-13 DIAGNOSIS — E11.42 TYPE 2 DIABETES MELLITUS WITH PERIPHERAL NEUROPATHY: ICD-10-CM

## 2022-04-13 DIAGNOSIS — B35.1 ONYCHOMYCOSIS: ICD-10-CM

## 2022-04-13 DIAGNOSIS — Z89.431 S/P TRANSMETATARSAL AMPUTATION OF FOOT, RIGHT: ICD-10-CM

## 2022-04-13 PROCEDURE — 11720 DEBRIDE NAIL 1-5: CPT | Performed by: PODIATRIST

## 2022-04-13 NOTE — PROGRESS NOTES
Emre Bergeron Sloan  1960  62 y.o. male   PCP: Rasheed rBavo MD - 7/19/2021  BS -  115 per patient    04/13/2022      Chief Complaint   Patient presents with   • Right Foot - Follow-up, Wound Check   • Left Foot - Follow-up       History of Present Illness    Diabetic male presents to clinic for follow-up right foot wound and routine check on both feet.    Past Medical History:   Diagnosis Date   • Arthritis    • Atrial fibrillation (HCC)    • Diabetes mellitus (HCC)    • Diabetic foot ulcer associated with type 2 diabetes mellitus (HCC)     left great toe   • Hallux malleus of left foot    • Hammer toe    • Hypertension    • MI (myocardial infarction) (HCC)    • Neuropathy in diabetes (HCC)    • Onychomycosis    • Presence of biventricular cardiac pacemaker          Past Surgical History:   Procedure Laterality Date   • AMPUTATION DIGIT Right 3/5/2017    Procedure: RIGHT AMPUTATION TRANSMETATRASAL , RECESSION GASTROCNEMOUS;  Surgeon: Marco A Thompson DPM;  Location: [facility];  Service:    • CARDIAC DEFIBRILLATOR PLACEMENT  2010, 2016   • CARPAL TUNNEL RELEASE  2015    elbow and wrist left arm   • FOOT IRRIGATION, DEBRIDEMENT AND REPAIR Left 3/7/2018    Procedure: FOOT IRRIGATION, DEBRIDEMENT AND REPAIR;  Surgeon: Marco A Thompson DPM;  Location: [facility];  Service:    • FOOT IRRIGATION, DEBRIDEMENT AND REPAIR Left 3/10/2018    Procedure: FOOT IRRIGATION, DEBRIDEMENT AND REPAIR;  Surgeon: Marco A Thompson DPM;  Location: [facility];  Service: Podiatry   • FOOT WOUND CLOSURE Right 3/2/2017    Procedure: INCISION AND DRAINAGE, RIGHT FOOT;  Surgeon: Tung Rosenthal DPM;  Location: [facility] OR;  Service:    • HAMMER TOE REPAIR Left 2/15/2018    Procedure: HAMMERTOE CORRECTION LEFT SECOND, THIRD AND FOURTH TOES AND HALLUX INTERPHALANGEAL JOINT ARTHRODESIS LEFT FOOT (Micro Asnis, 4.0 & 5.0 Asnis)      (c-arm);  Surgeon: Marco A Thompson DPM;  Location: [facility];  Service:    • HARDWARE REMOVAL Left 3/5/2018    Procedure:  ANKLE/FOOT HARDWARE REMOVAL;  Surgeon: Marco A Thompson DPM;  Location: [facility] OR;  Service:    • INCISION AND DRAINAGE LEG Left 3/5/2018    Procedure: INCISION AND DRAINAGE LOWER EXTREMITY;  Surgeon: Marco A Thompson DPM;  Location: [facility] OR;  Service:    • PLANTAR FASCIA RELEASE Left 11/21/2019    Procedure: PLANTAR PLANING LEFT FOOT AND ALL OTHER INDICATED PROCEDURES;  Surgeon: Marco A Thompson DPM;  Location: [facility] OR;  Service: Podiatry   • TOE AMPUTATION Right 2016   • TONSILECTOMY, ADENOIDECTOMY, BILATERAL MYRINGOTOMY AND TUBES      age 25         Family History   Problem Relation Age of Onset   • Diabetes Father    • Stroke Father    • No Known Problems Maternal Grandmother    • No Known Problems Maternal Grandfather    • No Known Problems Paternal Grandmother    • No Known Problems Paternal Grandfather    • No Known Problems Son    • No Known Problems Daughter    • No Known Problems Daughter          Social History     Socioeconomic History   • Marital status:    Tobacco Use   • Smoking status: Never Smoker   • Smokeless tobacco: Never Used   Vaping Use   • Vaping Use: Never used   Substance and Sexual Activity   • Alcohol use: No   • Drug use: No   • Sexual activity: Defer         Current Outpatient Medications   Medication Sig Dispense Refill   • ascorbic acid (VITAMIN C) 1000 MG tablet Take 1,000 mg by mouth Daily.     • aspirin 81 MG EC tablet Take 81 mg by mouth Daily.     • Blood Glucose Monitoring Suppl (ONE TOUCH ULTRA 2) w/Device kit      • Blood Glucose Monitoring Suppl kit TEST BS THREE TIMES DAILY FILL WITH FORMULARY DX E11.42     • Cholecalciferol 125 MCG (5000 UT) tablet Take 5,000 Units by mouth Daily.     • cyclobenzaprine (FLEXERIL) 10 MG tablet Take 1 tablet by mouth 3 (Three) Times a Day As Needed for Muscle Spasms. 21 tablet 0   • doxycycline (VIBRAMYCIN) 100 MG capsule Take 1 capsule by mouth 2 (Two) Times a Day. 20 capsule 0   • doxycycline (VIBRAMYCIN) 100 MG capsule Take 1  "capsule by mouth 2 (Two) Times a Day. 20 capsule 0   • doxycycline (VIBRAMYCIN) 100 MG capsule Take 1 capsule by mouth 2 (Two) Times a Day. 20 capsule 0   • dronedarone (MULTAQ) 400 MG tablet Take 400 mg by mouth Every Night.     • fexofenadine (ALLEGRA) 180 MG tablet Take 1 tablet by mouth Daily.     • fluticasone (FLONASE) 50 MCG/ACT nasal spray 2 sprays into each nostril As Needed for Rhinitis.     • fluticasone (FLONASE) 50 MCG/ACT nasal spray 1 spray into the nostril(s) as directed by provider Daily.     • glimepiride (AMARYL) 4 MG tablet Take 4 mg by mouth.     • HYDROcodone-acetaminophen (NORCO) 7.5-325 MG per tablet Take 1 tablet by mouth Every 6 (Six) Hours As Needed for Moderate Pain . 12 tablet 0   • levoFLOXacin (LEVAQUIN) 500 MG tablet Take 1 tablet by mouth Daily. 10 tablet 0   • levoFLOXacin (LEVAQUIN) 500 MG tablet Take 1 tablet by mouth Daily. 14 tablet 1   • levoFLOXacin (LEVAQUIN) 500 MG tablet Take 1 tablet by mouth Daily. 14 tablet 0   • losartan (COZAAR) 25 MG tablet Take 25 mg by mouth Daily.     • magnesium oxide (MAG-OX) 400 MG tablet      • metFORMIN (GLUCOPHAGE) 1000 MG tablet Take 1 tablet by mouth 2 (Two) Times a Day With Meals.     • metoprolol succinate XL (TOPROL-XL) 25 MG 24 hr tablet Take 25 mg by mouth Every Night. 0.5 tablet     • ONE TOUCH ULTRA TEST test strip USE TO TEST BLOOD SUGAR THREE TIMES A DAY  1   • vitamin C (ASCORBIC ACID) 500 MG tablet Take 500 mg by mouth Every Night.     • vitamin E 200 UNIT capsule Take 200 Units by mouth Every Night.     • vitamin E 200 UNIT capsule Take 200 Units by mouth Daily.       No current facility-administered medications for this visit.           OBJECTIVE    Pulse 73   Ht 193 cm (76\")   Wt (!) 159 kg (350 lb)   SpO2 98%   BMI 42.60 kg/m²     Review of Systems   Constitutional: Negative.    HENT: Negative.    Eyes: Negative.    Respiratory: Negative.    Cardiovascular: Negative.    Gastrointestinal: Negative.    Musculoskeletal: " Negative.    Skin: Negative for color change and wound.   Psychiatric/Behavioral: Negative.              Physical Exam   Constitutional: He is oriented to person, place, and time. He appears well-developed. No distress.   HENT:   Head: Normocephalic and atraumatic.   Nose: Nose normal.   Eyes: Pupils are equal, round, and reactive to light.   Pulmonary/Chest: Effort normal. No respiratory distress.   Musculoskeletal: Deformity present. No tenderness.   Neurological: He is alert and oriented to person, place, and time.   Skin: Skin is warm and dry. Capillary refill takes less than 2 seconds. No erythema.   Psychiatric: His behavior is normal. Judgment normal.   Vitals reviewed.    Lower Extremity Exam:     Cardiovascular:    DP/PT pulses palpable b/l    CFT brisk  to all digits b/l  Edema right lower extremity  Musculoskeletal:  Muscle strength is within normal limits  Rocker-bottom deformity left.  TMA right  No pain on palpation noted  b/l  Dermatological:   No open wounds  Toenails 1 through 5 left are thickened, discolored, elongated with subungual debris.  Neurological:   Protective sensation absent 10 out of 10 sites on the left and 5 out of 5 sites right            Procedures      ASSESSMENT AND PLAN    Diagnoses and all orders for this visit:    1. 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 Type 2 diabetes mellitus with peripheral neuropathy (HCC)    3. Onychomycosis    4. S/P transmetatarsal amputation of foot, right (HCC)      -Toenails 1 through 5 left were debrided in length and thickness with nail nippers and a dermal.  - All the patients questions were answered.  - RTC 3 months           This document has been electronically signed by Marco A Thompson DPM on April 13, 2022 09:51 CDT

## 2022-09-16 NOTE — PAT
Discussed cardiac hx with Dr. Buenrostro, no new orders received. Chlorhexidine cloth instructions given, patient voiced understanding. Spoke with Lori, with Dr. Thompson, said ok for patient to continue taking ASA prior to surgery.   Patient requests all Lab, Cardiology, and Radiology Results on their Discharge Instructions

## 2022-12-15 ENCOUNTER — OFFICE VISIT (OUTPATIENT)
Dept: ORTHOPEDIC SURGERY | Facility: CLINIC | Age: 62
End: 2022-12-15

## 2022-12-15 VITALS — HEIGHT: 76 IN | BODY MASS INDEX: 38.36 KG/M2 | WEIGHT: 315 LBS

## 2022-12-15 DIAGNOSIS — M25.572 ACUTE LEFT ANKLE PAIN: Primary | ICD-10-CM

## 2022-12-15 DIAGNOSIS — S99.912A INJURY OF LEFT ANKLE, INITIAL ENCOUNTER: ICD-10-CM

## 2022-12-15 DIAGNOSIS — G89.29 CHRONIC PAIN OF LEFT ANKLE: Primary | ICD-10-CM

## 2022-12-15 DIAGNOSIS — M25.572 CHRONIC PAIN OF LEFT ANKLE: Primary | ICD-10-CM

## 2022-12-15 PROCEDURE — 99204 OFFICE O/P NEW MOD 45 MIN: CPT | Performed by: NURSE PRACTITIONER

## 2022-12-15 RX ORDER — DIPHENOXYLATE HYDROCHLORIDE AND ATROPINE SULFATE 2.5; .025 MG/1; MG/1
TABLET ORAL NIGHTLY
COMMUNITY

## 2022-12-15 NOTE — PROGRESS NOTES
Emre Lisa is a 62 y.o. male   Primary provider:  Jamaal Bravo MD       Chief Complaint   Patient presents with   • Left Ankle - Pain   • Establish Care       HISTORY OF PRESENT ILLNESS:    Mr. Lisa is a 62-year-old male who presents today with complaints of left ankle pain.  Patient has had left foot/ankle pain for many years.  Patient has Charcot foot.  However his pain is recently increased since rolling his ankle on 11/20/2022.  Since then he reports a moderate increase in his pain.  He reports a stabbing pain in his ankle.  He reports swelling.  He reports he is able to go short distances as well as he always has, however he reports increased pain with longer distances such as when going to the store.  He reports diabetic neuropathy.  No fever, nausea, vomiting.  He has seen Dr. Thompson in the past.    Pain  This is a new problem. The current episode started 1 to 4 weeks ago (11/20/2022). Associated symptoms comments: Stabbing, redness, swelling. The symptoms are aggravated by standing and walking. He has tried NSAIDs and acetaminophen for the symptoms.        CONCURRENT MEDICAL HISTORY:    Past Medical History:   Diagnosis Date   • Arthritis    • Atrial fibrillation ([facility])    • Diabetes mellitus ([facility])    • Diabetic foot ulcer associated with type 2 diabetes mellitus ([facility])     left great toe   • Hallux malleus of left foot    • Hammer toe    • Hypertension    • MI (myocardial infarction) ([facility])    • Neuropathy in diabetes ([facility])    • Onychomycosis    • Presence of biventricular cardiac pacemaker    • Rheumatoid arthritis (HCC)        Allergies   Allergen Reactions   • Penicillins Anaphylaxis   • Januvia [Sitagliptin] Hives   • Lipitor [Atorvastatin] Other (See Comments)     Muscle Cramps...   • Shellfish Allergy Other (See Comments)   • Peanut Oil Rash   • Peanut-Containing Drug Products Rash   • Sulfa Antibiotics Rash         Current Outpatient Medications:   •  ascorbic acid (VITAMIN C) 1000 MG  tablet, Take 1,000 mg by mouth Daily., Disp: , Rfl:   •  Blood Glucose Monitoring Suppl (ONE TOUCH ULTRA 2) w/Device kit, , Disp: , Rfl:   •  Cholecalciferol 125 MCG (5000 UT) tablet, Take 5,000 Units by mouth Daily., Disp: , Rfl:   •  doxycycline (VIBRAMYCIN) 100 MG capsule, Take 1 capsule by mouth 2 (Two) Times a Day., Disp: 20 capsule, Rfl: 0  •  dronedarone (MULTAQ) 400 MG tablet, Take 400 mg by mouth Every Night., Disp: , Rfl:   •  fexofenadine (ALLEGRA) 180 MG tablet, Take 1 tablet by mouth Daily., Disp: , Rfl:   •  fluticasone (FLONASE) 50 MCG/ACT nasal spray, 2 sprays into each nostril As Needed for Rhinitis., Disp: , Rfl:   •  glimepiride (AMARYL) 4 MG tablet, Take 4 mg by mouth., Disp: , Rfl:   •  losartan (COZAAR) 25 MG tablet, Take 25 mg by mouth Daily., Disp: , Rfl:   •  magnesium oxide (MAG-OX) 400 MG tablet, , Disp: , Rfl:   •  metFORMIN (GLUCOPHAGE) 1000 MG tablet, Take 1 tablet by mouth 2 (Two) Times a Day With Meals., Disp: , Rfl:   •  metoprolol succinate XL (TOPROL-XL) 25 MG 24 hr tablet, Take 25 mg by mouth Every Night. 0.5 tablet, Disp: , Rfl:   •  Multiple Vitamins-Minerals (ZINC PO), Take  by mouth., Disp: , Rfl:   •  multivitamin (MULTI-VITAMIN PO), Take  by mouth Daily., Disp: , Rfl:   •  ONE TOUCH ULTRA TEST test strip, USE TO TEST BLOOD SUGAR THREE TIMES A DAY, Disp: , Rfl: 1  •  vitamin C (ASCORBIC ACID) 500 MG tablet, Take 500 mg by mouth Every Night., Disp: , Rfl:     Past Surgical History:   Procedure Laterality Date   • AMPUTATION DIGIT Right 3/5/2017    Procedure: RIGHT AMPUTATION TRANSMETATRASAL , RECESSION GASTROCNEMOUS;  Surgeon: Marco A Thompson DPM;  Location: [facility];  Service:    • CARDIAC DEFIBRILLATOR PLACEMENT  2010, 2016   • CARPAL TUNNEL RELEASE  2015    elbow and wrist left arm   • FOOT IRRIGATION, DEBRIDEMENT AND REPAIR Left 3/7/2018    Procedure: FOOT IRRIGATION, DEBRIDEMENT AND REPAIR;  Surgeon: Marco A Thompson DPM;  Location: [facility];  Service:    • FOOT  IRRIGATION, DEBRIDEMENT AND REPAIR Left 3/10/2018    Procedure: FOOT IRRIGATION, DEBRIDEMENT AND REPAIR;  Surgeon: Marco A Thompson DPM;  Location: [facility] OR;  Service: Podiatry   • FOOT WOUND CLOSURE Right 3/2/2017    Procedure: INCISION AND DRAINAGE, RIGHT FOOT;  Surgeon: Tung Rosenthal DPM;  Location: [facility];  Service:    • HAMMER TOE REPAIR Left 2/15/2018    Procedure: HAMMERTOE CORRECTION LEFT SECOND, THIRD AND FOURTH TOES AND HALLUX INTERPHALANGEAL JOINT ARTHRODESIS LEFT FOOT (Micro Asnis, 4.0 & 5.0 Asnis)      (c-arm);  Surgeon: Marco A Thompson DPM;  Location: [facility] OR;  Service:    • HARDWARE REMOVAL Left 3/5/2018    Procedure: ANKLE/FOOT HARDWARE REMOVAL;  Surgeon: Marco A Thompson DPM;  Location: [facility] OR;  Service:    • INCISION AND DRAINAGE LEG Left 3/5/2018    Procedure: INCISION AND DRAINAGE LOWER EXTREMITY;  Surgeon: Marco A Thompson DPM;  Location: [facility] OR;  Service:    • PLANTAR FASCIA RELEASE Left 11/21/2019    Procedure: PLANTAR PLANING LEFT FOOT AND ALL OTHER INDICATED PROCEDURES;  Surgeon: Marco A Thompson DPM;  Location: [facility];  Service: Podiatry   • TOE AMPUTATION Right 2016   • TONSILECTOMY, ADENOIDECTOMY, BILATERAL MYRINGOTOMY AND TUBES      age 25       Family History   Problem Relation Age of Onset   • Diabetes Father    • Stroke Father    • No Known Problems Maternal Grandmother    • No Known Problems Maternal Grandfather    • No Known Problems Paternal Grandmother    • No Known Problems Paternal Grandfather    • No Known Problems Daughter    • No Known Problems Daughter    • No Known Problems Son    • Heart disease Other    • Hypertension Other        Social History     Socioeconomic History   • Marital status:    Tobacco Use   • Smoking status: Never   • Smokeless tobacco: Never   Vaping Use   • Vaping Use: Never used   Substance and Sexual Activity   • Alcohol use: Yes     Comment: social   • Drug use: No   • Sexual activity: Defer        Review of Systems  "  Constitutional: Negative.    HENT: Negative.    Eyes: Negative.    Respiratory: Negative.    Cardiovascular: Negative.    Gastrointestinal: Negative.    Endocrine: Negative.    Genitourinary: Negative.    Musculoskeletal: Negative.    Skin: Negative.    Allergic/Immunologic: Negative.    Neurological: Negative.    Hematological: Negative.    Psychiatric/Behavioral: Negative.    Left foot/ankle pain    PHYSICAL EXAMINATION:       Ht 193 cm (76\")   Wt (!) 166 kg (365 lb 1.6 oz)   BMI 44.44 kg/m²     Physical Exam  Vitals and nursing note reviewed.   Constitutional:       General: He is not in acute distress.     Appearance: He is well-developed. He is not toxic-appearing.   HENT:      Head: Normocephalic.   Pulmonary:      Effort: Pulmonary effort is normal. No respiratory distress.   Skin:     General: Skin is warm and dry.   Neurological:      Mental Status: He is alert and oriented to person, place, and time.   Psychiatric:         Behavior: Behavior normal.         Thought Content: Thought content normal.         Judgment: Judgment normal.         GAIT:     []  Normal  [x]  Antalgic    Assistive device: [x]  None  []  Walker     []  Crutches  []  Cane     []  Wheelchair  []  Stretcher    Ortho Exam    Left ankle:    Pain and limitations with arc of motion.  Moderate swelling.  Distal pulses intact.  Toes are warm, pink, with good capillary refill.  No specific bony tenderness.          No results found.        ASSESSMENT:    Diagnoses and all orders for this visit:    Acute left ankle pain  -     CT ankle left wo contrast; Future    Injury of left ankle, initial encounter  -     CT ankle left wo contrast; Future    Other orders  -     Multiple Vitamins-Minerals (ZINC PO); Take  by mouth.  -     multivitamin (MULTI-VITAMIN PO); Take  by mouth Daily.          PLAN    X-rays reviewed, no acute bony abnormality identified.  Suspicion for fracture is low as patient is able to bear weight and increased pain does not " start until a prolonged distance, however patient's deformity seen on x-ray could mask a fracture and patient's diabetic neuropathy causes him decree sensation which could possibly mask pain caused by fracture.  We will proceed with CT.  Patient also given lace up ankle brace for more support.  Patient instructed to use crutches or walker for modified weightbearing.  Patient refuses order, but states he has a cane at home.   Patient instructed to perform RICE therapy.  Recommend patient return to podiatry for further care of Charcot foot.      Return for CT results .    EMR Dragon/Transciption Disclaimer: Some of this note may be an electronic transcription/translation of spoken language to printed text.  The electronic translation of spoken language may permit erroneous, or at times, nonsensical words or phrases to be inadvertently transcribed. Although I have reviewed the note for such errors, some may still exist.       This document has been electronically signed by Pat WATSON on December 15, 2022 10:04 CST

## 2022-12-16 ENCOUNTER — APPOINTMENT (OUTPATIENT)
Dept: CT IMAGING | Facility: HOSPITAL | Age: 62
End: 2022-12-16

## 2022-12-16 ENCOUNTER — HOSPITAL ENCOUNTER (EMERGENCY)
Facility: HOSPITAL | Age: 62
Discharge: HOME | End: 2022-12-16
Attending: FAMILY MEDICINE | Admitting: FAMILY MEDICINE

## 2022-12-16 VITALS
OXYGEN SATURATION: 97 % | BODY MASS INDEX: 38.36 KG/M2 | TEMPERATURE: 98.3 F | SYSTOLIC BLOOD PRESSURE: 144 MMHG | HEART RATE: 99 BPM | RESPIRATION RATE: 18 BRPM | HEIGHT: 76 IN | WEIGHT: 315 LBS | DIASTOLIC BLOOD PRESSURE: 79 MMHG

## 2022-12-16 DIAGNOSIS — S92.142A CLOSED DISPLACED FRACTURE OF DOME OF LEFT TALUS, INITIAL ENCOUNTER: Primary | ICD-10-CM

## 2022-12-16 PROCEDURE — 99282 EMERGENCY DEPT VISIT SF MDM: CPT

## 2022-12-16 PROCEDURE — 73700 CT LOWER EXTREMITY W/O DYE: CPT

## 2022-12-16 RX ORDER — HYDROCODONE BITARTRATE AND ACETAMINOPHEN 7.5; 325 MG/1; MG/1
1 TABLET ORAL EVERY 6 HOURS PRN
Qty: 12 TABLET | Refills: 0 | Status: SHIPPED | OUTPATIENT
Start: 2022-12-16 | End: 2023-03-22

## 2022-12-17 NOTE — ED PROVIDER NOTES
"Subjective   History of Present Illness  Pt c/o left ankle pain x 3 weeks since \"rolling\" his ankle. He was seen in the clinic yesterday by orthopedics. Pt states that his pain has woesened since rolling his foot again today. +h/o charcot's foot and DM. Pt had  xrays done yesterday.   Rolled ankle 3 weeks ago, saw maikel yest.      Ankle Pain  Location:  Ankle  Time since incident:  1 day  Injury: yes    Ankle location:  L ankle  Pain details:     Quality:  Aching    Radiates to:  Does not radiate    Severity:  Moderate    Duration:  1 day    Timing:  Constant    Progression:  Worsening  Chronicity:  New  Dislocation: no    Foreign body present:  No foreign bodies  Associated symptoms: no fatigue, no fever and no neck pain        Review of Systems   Constitutional: Negative for appetite change, chills, diaphoresis, fatigue and fever.   HENT: Negative for congestion, ear discharge, ear pain, nosebleeds, rhinorrhea, sinus pressure, sore throat and trouble swallowing.    Eyes: Negative for discharge and redness.   Respiratory: Negative for apnea, cough, chest tightness, shortness of breath and wheezing.    Cardiovascular: Negative for chest pain.   Gastrointestinal: Negative for abdominal pain, diarrhea, nausea and vomiting.   Endocrine: Negative for polyuria.   Genitourinary: Negative for dysuria, frequency and urgency.   Musculoskeletal: Positive for arthralgias, gait problem and joint swelling. Negative for myalgias and neck pain.   Skin: Negative for color change and rash.   Allergic/Immunologic: Negative for immunocompromised state.   Neurological: Negative for dizziness, seizures, syncope, weakness, light-headedness and headaches.   Hematological: Negative for adenopathy. Does not bruise/bleed easily.   Psychiatric/Behavioral: Negative for behavioral problems and confusion.   All other systems reviewed and are negative.      Past Medical History:   Diagnosis Date   • Arthritis    • Atrial fibrillation (HCC)    • " Diabetes mellitus ([facility])    • Diabetic foot ulcer associated with type 2 diabetes mellitus ([facility])     left great toe   • Hallux malleus of left foot    • Hammer toe    • Hypertension    • MI (myocardial infarction) ([facility])    • Neuropathy in diabetes ([facility])    • Onychomycosis    • Presence of biventricular cardiac pacemaker    • Rheumatoid arthritis ([facility])        Allergies   Allergen Reactions   • Penicillins Anaphylaxis   • Januvia [Sitagliptin] Hives   • Lipitor [Atorvastatin] Other (See Comments)     Muscle Cramps...   • Shellfish Allergy Other (See Comments)   • Peanut Oil Rash   • Peanut-Containing Drug Products Rash   • Sulfa Antibiotics Rash       Past Surgical History:   Procedure Laterality Date   • AMPUTATION DIGIT Right 3/5/2017    Procedure: RIGHT AMPUTATION TRANSMETATRASAL , RECESSION GASTROCNEMOUS;  Surgeon: Marco A Thompson DPM;  Location: [facility] OR;  Service:    • CARDIAC DEFIBRILLATOR PLACEMENT  2010, 2016   • CARPAL TUNNEL RELEASE  2015    elbow and wrist left arm   • FOOT IRRIGATION, DEBRIDEMENT AND REPAIR Left 3/7/2018    Procedure: FOOT IRRIGATION, DEBRIDEMENT AND REPAIR;  Surgeon: Marco A Thompson DPM;  Location: [facility] OR;  Service:    • FOOT IRRIGATION, DEBRIDEMENT AND REPAIR Left 3/10/2018    Procedure: FOOT IRRIGATION, DEBRIDEMENT AND REPAIR;  Surgeon: Marco A Thompson DPM;  Location: [facility] OR;  Service: Podiatry   • FOOT WOUND CLOSURE Right 3/2/2017    Procedure: INCISION AND DRAINAGE, RIGHT FOOT;  Surgeon: Tung Rosenthal DPM;  Location: [facility] OR;  Service:    • HAMMER TOE REPAIR Left 2/15/2018    Procedure: HAMMERTOE CORRECTION LEFT SECOND, THIRD AND FOURTH TOES AND HALLUX INTERPHALANGEAL JOINT ARTHRODESIS LEFT FOOT (Micro Asnis, 4.0 & 5.0 Asnis)      (c-arm);  Surgeon: Marco A Thompson DPM;  Location: [facility] OR;  Service:    • HARDWARE REMOVAL Left 3/5/2018    Procedure: ANKLE/FOOT HARDWARE REMOVAL;  Surgeon: Marco A Thompson DPM;  Location: [facility] OR;  Service:    • INCISION AND DRAINAGE LEG  Left 3/5/2018    Procedure: INCISION AND DRAINAGE LOWER EXTREMITY;  Surgeon: Marco A Thompson DPM;  Location: [facility] OR;  Service:    • PLANTAR FASCIA RELEASE Left 11/21/2019    Procedure: PLANTAR PLANING LEFT FOOT AND ALL OTHER INDICATED PROCEDURES;  Surgeon: Marco A Thompson DPM;  Location: [facility];  Service: Podiatry   • TOE AMPUTATION Right 2016   • TONSILECTOMY, ADENOIDECTOMY, BILATERAL MYRINGOTOMY AND TUBES      age 25       Family History   Problem Relation Age of Onset   • Diabetes Father    • Stroke Father    • No Known Problems Maternal Grandmother    • No Known Problems Maternal Grandfather    • No Known Problems Paternal Grandmother    • No Known Problems Paternal Grandfather    • No Known Problems Daughter    • No Known Problems Daughter    • No Known Problems Son    • Heart disease Other    • Hypertension Other        Social History     Socioeconomic History   • Marital status:    Tobacco Use   • Smoking status: Never   • Smokeless tobacco: Never   Vaping Use   • Vaping Use: Never used   Substance and Sexual Activity   • Alcohol use: Yes     Comment: social   • Drug use: No   • Sexual activity: Defer           Objective   Physical Exam  Vitals and nursing note reviewed.   Constitutional:       Appearance: He is well-developed.   HENT:      Head: Normocephalic and atraumatic.      Nose: Nose normal.   Eyes:      General: No scleral icterus.        Right eye: No discharge.         Left eye: No discharge.      Conjunctiva/sclera: Conjunctivae normal.      Pupils: Pupils are equal, round, and reactive to light.   Neck:      Trachea: No tracheal deviation.   Cardiovascular:      Rate and Rhythm: Normal rate and regular rhythm.      Heart sounds: Normal heart sounds. No murmur heard.  Pulmonary:      Effort: Pulmonary effort is normal. No respiratory distress.      Breath sounds: Normal breath sounds. No stridor. No wheezing or rales.   Abdominal:      General: Bowel sounds are normal. There is no  distension.      Palpations: Abdomen is soft. There is no mass.      Tenderness: There is no abdominal tenderness. There is no guarding or rebound.   Musculoskeletal:      Cervical back: Normal range of motion and neck supple.      Left foot: Decreased range of motion. Swelling, deformity, Charcot foot and tenderness present.   Skin:     General: Skin is warm and dry.      Findings: No erythema or rash.   Neurological:      Mental Status: He is alert and oriented to person, place, and time.      Coordination: Coordination normal.   Psychiatric:         Behavior: Behavior normal.         Thought Content: Thought content normal.         Procedures           ED Course              Labs Reviewed - No data to display    CT Lower Extremity Left Without Contrast   Final Result      1.  Destruction and sclerosis of the mid foot suggesting   neuropathic foot with rocker-bottom deformity.   2.  There is also fragmentation of the talar dome which appears   more acute. Possibility of acute talar fracture should be   considered.      Electronically signed by:  Man Perez MD  12/16/2022 10:57 PM [facility]   Workstation: [id number]    Final diagnoses:   Closed displaced fracture of dome of left talus, initial encounter       ED Disposition  ED Disposition     ED Disposition   Discharge    Condition   Stable    Comment   --             Marco A Thompson, DPTORI  [address]  [facility] [address] [place], [place] [id number] [phone]               Medication List      New Prescriptions    HYDROcodone-acetaminophen 7.5-325 MG per tablet  Commonly known as: NORCO  Take 1 tablet by mouth Every 6 (Six) Hours As Needed for Moderate Pain.           Where to Get Your Medications      These medications were sent to [facility]/pharmacy #[id number] - [place], [place] - [address] [place] [address] - [phone]  - [phone] FX  [address] [place] [id number]    Phone: [phone]   · HYDROcodone-acetaminophen  7.5-325 MG per tablet         Wesly Saini MD  12/23/22 [id number]

## 2022-12-19 ENCOUNTER — TELEPHONE (OUTPATIENT)
Dept: PODIATRY | Facility: CLINIC | Age: 62
End: 2022-12-19

## 2022-12-19 NOTE — TELEPHONE ENCOUNTER
Patient called and wanted to be worked in today with dr cantu because he went to emergency department on 12/16/22 and has broken his ankle.  I told him that Dr Cantu is in surgery today and is in [place] Tuesday so the soonest is Wednesday.  He requested that Dr Cantu call him.

## 2022-12-19 NOTE — TELEPHONE ENCOUNTER
PATIENT CALLED AGAIN AND REALLY NEEDS PROVIDER TO CALL HIM, HE UNWRAPPED ANKLE AND STATES IT LOOKS BAD, AND BELIEVES HE HAS A ULCER FORMING WHERE HE HAS BEEN WALKING ON IT.  PATIENT IS AWARE THAT DR BUENO IS IN SURGERY TODAY

## 2022-12-20 ENCOUNTER — APPOINTMENT (OUTPATIENT)
Dept: CT IMAGING | Facility: HOSPITAL | Age: 62
End: 2022-12-20

## 2022-12-21 ENCOUNTER — OFFICE VISIT (OUTPATIENT)
Dept: PODIATRY | Facility: CLINIC | Age: 62
End: 2022-12-21

## 2022-12-21 VITALS — WEIGHT: 315 LBS | HEIGHT: 76 IN | BODY MASS INDEX: 38.36 KG/M2

## 2022-12-21 DIAGNOSIS — M14.672 CHARCOT'S JOINT, LEFT ANKLE AND FOOT: Primary | ICD-10-CM

## 2022-12-21 DIAGNOSIS — E11.42 TYPE 2 DIABETES MELLITUS WITH PERIPHERAL NEUROPATHY: ICD-10-CM

## 2022-12-21 DIAGNOSIS — S81.802A WOUND OF LEFT LOWER EXTREMITY, INITIAL ENCOUNTER: ICD-10-CM

## 2022-12-21 PROCEDURE — 29445 APPL RIGID TOT CNTC LEG CAST: CPT | Performed by: PODIATRIST

## 2022-12-21 PROCEDURE — 99213 OFFICE O/P EST LOW 20 MIN: CPT | Performed by: PODIATRIST

## 2022-12-21 NOTE — PROGRESS NOTES
Emre Lisa  1960  62 y.o. male   PCP: Rasheed Bravo MD - 7/19/2021  BS -  115 per patient    12/21/2022      Chief Complaint   Patient presents with   • Left Ankle - Pain       History of Present Illness    62-year-old male presents to clinic today with new complaint of left foot and ankle injury.  Couple weeks ago patient fell twisting his left foot and ankle.  He has known Charcot deformity to the left foot and ankle.  However, since the injury there has been significantly increased swelling and also pain which patient does not normally have due to his neuropathy.  He is currently weightbearing in the cam boot.    Past Medical History:   Diagnosis Date   • Arthritis    • Atrial fibrillation ([facility])    • Diabetes mellitus ([facility])    • Diabetic foot ulcer associated with type 2 diabetes mellitus ([facility])     left great toe   • Hallux malleus of left foot    • Hammer toe    • Hypertension    • MI (myocardial infarction) ([facility])    • Neuropathy in diabetes ([facility])    • Onychomycosis    • Presence of biventricular cardiac pacemaker    • Rheumatoid arthritis ([facility])          Past Surgical History:   Procedure Laterality Date   • AMPUTATION DIGIT Right 3/5/2017    Procedure: RIGHT AMPUTATION TRANSMETATRASAL , RECESSION GASTROCNEMOUS;  Surgeon: Marco A Thompson DPM;  Location: [facility];  Service:    • CARDIAC DEFIBRILLATOR PLACEMENT  2010, 2016   • CARPAL TUNNEL RELEASE  2015    elbow and wrist left arm   • FOOT IRRIGATION, DEBRIDEMENT AND REPAIR Left 3/7/2018    Procedure: FOOT IRRIGATION, DEBRIDEMENT AND REPAIR;  Surgeon: Marco A Thompson DPM;  Location: [facility];  Service:    • FOOT IRRIGATION, DEBRIDEMENT AND REPAIR Left 3/10/2018    Procedure: FOOT IRRIGATION, DEBRIDEMENT AND REPAIR;  Surgeon: Marco A Thompson DPM;  Location: [facility] OR;  Service: Podiatry   • FOOT WOUND CLOSURE Right 3/2/2017    Procedure: INCISION AND DRAINAGE, RIGHT FOOT;  Surgeon: Tung Rosenthal DPM;  Location: [facility] OR;  Service:    • HAMMER  TOE REPAIR Left 2/15/2018    Procedure: HAMMERTOE CORRECTION LEFT SECOND, THIRD AND FOURTH TOES AND HALLUX INTERPHALANGEAL JOINT ARTHRODESIS LEFT FOOT (Micro Asnis, 4.0 & 5.0 Asnis)      (c-arm);  Surgeon: Marco A Thompson DPM;  Location: [facility];  Service:    • HARDWARE REMOVAL Left 3/5/2018    Procedure: ANKLE/FOOT HARDWARE REMOVAL;  Surgeon: Marco A Thompson DPM;  Location: [facility] OR;  Service:    • INCISION AND DRAINAGE LEG Left 3/5/2018    Procedure: INCISION AND DRAINAGE LOWER EXTREMITY;  Surgeon: Marco A Thompson DPM;  Location: [facility] OR;  Service:    • PLANTAR FASCIA RELEASE Left 11/21/2019    Procedure: PLANTAR PLANING LEFT FOOT AND ALL OTHER INDICATED PROCEDURES;  Surgeon: Marco A Thompson DPM;  Location: [facility];  Service: Podiatry   • TOE AMPUTATION Right 2016   • TONSILECTOMY, ADENOIDECTOMY, BILATERAL MYRINGOTOMY AND TUBES      age 25         Family History   Problem Relation Age of Onset   • Diabetes Father    • Stroke Father    • No Known Problems Maternal Grandmother    • No Known Problems Maternal Grandfather    • No Known Problems Paternal Grandmother    • No Known Problems Paternal Grandfather    • No Known Problems Daughter    • No Known Problems Daughter    • No Known Problems Son    • Heart disease Other    • Hypertension Other          Social History     Socioeconomic History   • Marital status:    Tobacco Use   • Smoking status: Never   • Smokeless tobacco: Never   Vaping Use   • Vaping Use: Never used   Substance and Sexual Activity   • Alcohol use: Yes     Comment: social   • Drug use: No   • Sexual activity: Defer         Current Outpatient Medications   Medication Sig Dispense Refill   • ascorbic acid (VITAMIN C) 1000 MG tablet Take 1,000 mg by mouth Daily.     • Blood Glucose Monitoring Suppl (ONE TOUCH ULTRA 2) w/Device kit      • Cholecalciferol 125 MCG (5000 UT) tablet Take 5,000 Units by mouth Daily.     • doxycycline (VIBRAMYCIN) 100 MG capsule Take 1 capsule by mouth 2  "(Two) Times a Day. 20 capsule 0   • dronedarone (MULTAQ) 400 MG tablet Take 400 mg by mouth Every Night.     • fexofenadine (ALLEGRA) 180 MG tablet Take 1 tablet by mouth Daily.     • fluticasone (FLONASE) 50 MCG/ACT nasal spray 2 sprays into each nostril As Needed for Rhinitis.     • glimepiride (AMARYL) 4 MG tablet Take 4 mg by mouth.     • HYDROcodone-acetaminophen (NORCO) 7.5-325 MG per tablet Take 1 tablet by mouth Every 6 (Six) Hours As Needed for Moderate Pain. 12 tablet 0   • losartan (COZAAR) 25 MG tablet Take 25 mg by mouth Daily.     • magnesium oxide (MAG-OX) 400 MG tablet      • metFORMIN (GLUCOPHAGE) 1000 MG tablet Take 1 tablet by mouth 2 (Two) Times a Day With Meals.     • metoprolol succinate XL (TOPROL-XL) 25 MG 24 hr tablet Take 25 mg by mouth Every Night. 0.5 tablet     • Multiple Vitamins-Minerals (ZINC PO) Take  by mouth.     • multivitamin (THERAGRAN) tablet tablet Take  by mouth Daily.     • ONE TOUCH ULTRA TEST test strip USE TO TEST BLOOD SUGAR THREE TIMES A DAY  1   • vitamin C (ASCORBIC ACID) 500 MG tablet Take 500 mg by mouth Every Night.       No current facility-administered medications for this visit.           OBJECTIVE    Ht 193 cm (76\")   Wt (!) 164 kg (362 lb)   BMI 44.06 kg/m²     Review of Systems   Constitutional: Negative.    HENT: Negative.    Eyes: Negative.    Respiratory: Negative.    Cardiovascular: Positive for leg swelling.   Gastrointestinal: Negative.    Musculoskeletal:        Left foot and ankle pain    Skin: Positive for wound. Negative for color change.   Psychiatric/Behavioral: Negative.              Physical Exam  Vitals reviewed.   Constitutional:       General: He is not in acute distress.     Appearance: He is well-developed.   HENT:      Head: Normocephalic and atraumatic.      Nose: Nose normal.   Eyes:      Conjunctiva/sclera: Conjunctivae normal.      Pupils: Pupils are equal, round, and reactive to light.   Cardiovascular:      Pulses:           " Dorsalis pedis pulses are 2+ on the left side.        Posterior tibial pulses are 2+ on the left side.   Pulmonary:      Effort: Pulmonary effort is normal. No respiratory distress.      Breath sounds: No wheezing.   Musculoskeletal:      Left foot: Decreased range of motion. Deformity and Charcot foot present.        Feet:    Feet:      Left foot:      Protective Sensation: 10 sites tested. 0 sites sensed.      Skin integrity: Skin breakdown present.   Skin:     General: Skin is warm.      Capillary Refill: Capillary refill takes less than 2 seconds.   Neurological:      Mental Status: He is alert and oriented to person, place, and time.   Psychiatric:         Behavior: Behavior normal.         Thought Content: Thought content normal.                   Procedures      ASSESSMENT AND PLAN    Diagnoses and all orders for this visit:    1. Charcot's joint, left ankle and foot (Primary)    2. Type 2 diabetes mellitus with peripheral neuropathy (HCC)    3. Wound of left lower extremity, initial encounter      - Patient examined and evaluated  -Patient with worsening Charcot deformity of his left foot and ankle secondary to a fall.  Radiographs and CT reviewed.  Patient will likely require Charcot reconstruction in the near future.  Patient is extremely swollen at this time.  Patient was placed into a offloading total contact cast.  He is to limit his weightbearing.  He is to elevate his lower extremity is much as possible.  - All questions were answered   - RTC 1 week            This document has been electronically signed by Marco A Thompson DPM on December 23, 2022 07:56 CST

## 2022-12-28 ENCOUNTER — OFFICE VISIT (OUTPATIENT)
Dept: PODIATRY | Facility: CLINIC | Age: 62
End: 2022-12-28

## 2022-12-28 VITALS — WEIGHT: 315 LBS | HEIGHT: 76 IN | BODY MASS INDEX: 38.36 KG/M2

## 2022-12-28 DIAGNOSIS — S81.802A WOUND OF LEFT LOWER EXTREMITY, INITIAL ENCOUNTER: ICD-10-CM

## 2022-12-28 DIAGNOSIS — E11.42 TYPE 2 DIABETES MELLITUS WITH PERIPHERAL NEUROPATHY: ICD-10-CM

## 2022-12-28 DIAGNOSIS — M14.672 CHARCOT'S JOINT, LEFT ANKLE AND FOOT: Primary | ICD-10-CM

## 2022-12-28 PROCEDURE — 99212 OFFICE O/P EST SF 10 MIN: CPT | Performed by: PODIATRIST

## 2022-12-28 NOTE — PROGRESS NOTES
Emre Bergeron Sloan  1960  62 y.o. male   PCP: Rasheed Bravo MD - 7/19/2021  BS -  112 per patient    12/28/2022      Chief Complaint   Patient presents with   • Left Foot - Pain, Follow-up, Wound Check       History of Present Illness    62-year-old male presents to clinic today with worsening charcot deformity, patient is in a TCC,  Patient states pain is a 3.    Past Medical History:   Diagnosis Date   • Arthritis    • Atrial fibrillation (HCC)    • Diabetes mellitus (HCC)    • Diabetic foot ulcer associated with type 2 diabetes mellitus (HCC)     left great toe   • Hallux malleus of left foot    • Hammer toe    • Hypertension    • MI (myocardial infarction) (HCC)    • Neuropathy in diabetes (HCC)    • Onychomycosis    • Presence of biventricular cardiac pacemaker    • Rheumatoid arthritis (HCC)          Past Surgical History:   Procedure Laterality Date   • AMPUTATION DIGIT Right 3/5/2017    Procedure: RIGHT AMPUTATION TRANSMETATRASAL , RECESSION GASTROCNEMOUS;  Surgeon: Marco A Thompson DPM;  Location: [facility];  Service:    • CARDIAC DEFIBRILLATOR PLACEMENT  2010, 2016   • CARPAL TUNNEL RELEASE  2015    elbow and wrist left arm   • FOOT IRRIGATION, DEBRIDEMENT AND REPAIR Left 3/7/2018    Procedure: FOOT IRRIGATION, DEBRIDEMENT AND REPAIR;  Surgeon: Marco A Thompson DPM;  Location: [facility];  Service:    • FOOT IRRIGATION, DEBRIDEMENT AND REPAIR Left 3/10/2018    Procedure: FOOT IRRIGATION, DEBRIDEMENT AND REPAIR;  Surgeon: Marco A Thompson DPM;  Location: [facility] OR;  Service: Podiatry   • FOOT WOUND CLOSURE Right 3/2/2017    Procedure: INCISION AND DRAINAGE, RIGHT FOOT;  Surgeon: Tung Rosenthal DPM;  Location: [facility] OR;  Service:    • HAMMER TOE REPAIR Left 2/15/2018    Procedure: HAMMERTOE CORRECTION LEFT SECOND, THIRD AND FOURTH TOES AND HALLUX INTERPHALANGEAL JOINT ARTHRODESIS LEFT FOOT (Micro Asnis, 4.0 & 5.0 Asnis)      (c-arm);  Surgeon: Marco A Thompson DPM;  Location: [facility];  Service:    •  HARDWARE REMOVAL Left 3/5/2018    Procedure: ANKLE/FOOT HARDWARE REMOVAL;  Surgeon: Marco A Thompson DPM;  Location: [facility] OR;  Service:    • INCISION AND DRAINAGE LEG Left 3/5/2018    Procedure: INCISION AND DRAINAGE LOWER EXTREMITY;  Surgeon: Marco A Thompson DPM;  Location: [facility] OR;  Service:    • PLANTAR FASCIA RELEASE Left 11/21/2019    Procedure: PLANTAR PLANING LEFT FOOT AND ALL OTHER INDICATED PROCEDURES;  Surgeon: Marco A Thompson DPM;  Location: [facility] OR;  Service: Podiatry   • TOE AMPUTATION Right 2016   • TONSILECTOMY, ADENOIDECTOMY, BILATERAL MYRINGOTOMY AND TUBES      age 25         Family History   Problem Relation Age of Onset   • Diabetes Father    • Stroke Father    • No Known Problems Maternal Grandmother    • No Known Problems Maternal Grandfather    • No Known Problems Paternal Grandmother    • No Known Problems Paternal Grandfather    • No Known Problems Daughter    • No Known Problems Daughter    • No Known Problems Son    • Heart disease Other    • Hypertension Other          Social History     Socioeconomic History   • Marital status:    Tobacco Use   • Smoking status: Never   • Smokeless tobacco: Never   Vaping Use   • Vaping Use: Never used   Substance and Sexual Activity   • Alcohol use: Yes     Comment: social   • Drug use: No   • Sexual activity: Defer         Current Outpatient Medications   Medication Sig Dispense Refill   • ascorbic acid (VITAMIN C) 1000 MG tablet Take 1,000 mg by mouth Daily.     • Blood Glucose Monitoring Suppl (ONE TOUCH ULTRA 2) w/Device kit      • Cholecalciferol 125 MCG (5000 UT) tablet Take 5,000 Units by mouth Daily.     • doxycycline (VIBRAMYCIN) 100 MG capsule Take 1 capsule by mouth 2 (Two) Times a Day. 20 capsule 0   • dronedarone (MULTAQ) 400 MG tablet Take 400 mg by mouth Every Night.     • fexofenadine (ALLEGRA) 180 MG tablet Take 1 tablet by mouth Daily.     • fluticasone (FLONASE) 50 MCG/ACT nasal spray 2 sprays into each nostril As Needed  "for Rhinitis.     • glimepiride (AMARYL) 4 MG tablet Take 4 mg by mouth.     • HYDROcodone-acetaminophen (NORCO) 7.5-325 MG per tablet Take 1 tablet by mouth Every 6 (Six) Hours As Needed for Moderate Pain. 12 tablet 0   • losartan (COZAAR) 25 MG tablet Take 25 mg by mouth Daily.     • magnesium oxide (MAG-OX) 400 MG tablet      • metFORMIN (GLUCOPHAGE) 1000 MG tablet Take 1 tablet by mouth 2 (Two) Times a Day With Meals.     • metoprolol succinate XL (TOPROL-XL) 25 MG 24 hr tablet Take 25 mg by mouth Every Night. 0.5 tablet     • Multiple Vitamins-Minerals (ZINC PO) Take  by mouth.     • multivitamin (THERAGRAN) tablet tablet Take  by mouth Daily.     • ONE TOUCH ULTRA TEST test strip USE TO TEST BLOOD SUGAR THREE TIMES A DAY  1   • vitamin C (ASCORBIC ACID) 500 MG tablet Take 500 mg by mouth Every Night.       No current facility-administered medications for this visit.           OBJECTIVE    Ht 193 cm (76\")   Wt (!) 164 kg (362 lb)   BMI 44.06 kg/m²     Review of Systems   Constitutional: Negative.    HENT: Negative.    Eyes: Negative.    Respiratory: Negative.    Cardiovascular: Positive for leg swelling.   Gastrointestinal: Negative.    Musculoskeletal:        Left foot and ankle pain    Skin: Positive for wound. Negative for color change.   Psychiatric/Behavioral: Negative.              Physical Exam  Vitals reviewed.   Constitutional:       General: He is not in acute distress.     Appearance: He is well-developed.   HENT:      Head: Normocephalic and atraumatic.      Nose: Nose normal.   Eyes:      Conjunctiva/sclera: Conjunctivae normal.      Pupils: Pupils are equal, round, and reactive to light.   Cardiovascular:      Pulses:           Dorsalis pedis pulses are 2+ on the left side.        Posterior tibial pulses are 2+ on the left side.   Pulmonary:      Effort: Pulmonary effort is normal. No respiratory distress.      Breath sounds: No wheezing.   Musculoskeletal:      Left foot: Decreased range of " motion. Deformity and Charcot foot present.        Feet:    Feet:      Left foot:      Protective Sensation: 10 sites tested. 0 sites sensed.      Skin integrity: Skin breakdown present.   Skin:     General: Skin is warm.      Capillary Refill: Capillary refill takes less than 2 seconds.   Neurological:      Mental Status: He is alert and oriented to person, place, and time.   Psychiatric:         Behavior: Behavior normal.         Thought Content: Thought content normal.             Procedures      ASSESSMENT AND PLAN    Diagnoses and all orders for this visit:    1. Charcot's joint, left ankle and foot (Primary)    2. Wound of left lower extremity, initial encounter    3. Type 2 diabetes mellitus with peripheral neuropathy (HCC)      - unna boot applied for edema control  - All questions were answered   - RTC 1 week            This document has been electronically signed by Marco A Thompson DPM on December 30, 2022 13:08 CST

## 2023-01-04 ENCOUNTER — OFFICE VISIT (OUTPATIENT)
Dept: PODIATRY | Facility: CLINIC | Age: 63
End: 2023-01-04
Payer: MEDICARE

## 2023-01-04 ENCOUNTER — LAB REQUISITION (OUTPATIENT)
Dept: LAB | Facility: HOSPITAL | Age: 63
End: 2023-01-04
Payer: MEDICARE

## 2023-01-04 VITALS — HEIGHT: 76 IN | OXYGEN SATURATION: 99 % | BODY MASS INDEX: 38.36 KG/M2 | HEART RATE: 98 BPM | WEIGHT: 315 LBS

## 2023-01-04 DIAGNOSIS — B35.1 ONYCHOMYCOSIS: ICD-10-CM

## 2023-01-04 DIAGNOSIS — S81.802A WOUND OF LEFT LOWER EXTREMITY, INITIAL ENCOUNTER: ICD-10-CM

## 2023-01-04 DIAGNOSIS — T78.01XA ANAPHYLACTIC REACTION DUE TO PEANUTS, INITIAL ENCOUNTER: ICD-10-CM

## 2023-01-04 DIAGNOSIS — I73.9 CLAUDICATION: ICD-10-CM

## 2023-01-04 DIAGNOSIS — M14.672 CHARCOT'S JOINT, LEFT ANKLE AND FOOT: Primary | ICD-10-CM

## 2023-01-04 DIAGNOSIS — R60.0 LOWER EXTREMITY EDEMA: ICD-10-CM

## 2023-01-04 PROCEDURE — 29580 STRAPPING UNNA BOOT: CPT | Performed by: NURSE PRACTITIONER

## 2023-01-04 PROCEDURE — 1159F MED LIST DOCD IN RCRD: CPT | Performed by: NURSE PRACTITIONER

## 2023-01-04 PROCEDURE — 99213 OFFICE O/P EST LOW 20 MIN: CPT | Performed by: NURSE PRACTITIONER

## 2023-01-04 PROCEDURE — 36415 COLL VENOUS BLD VENIPUNCTURE: CPT | Performed by: ALLERGY & IMMUNOLOGY

## 2023-01-04 PROCEDURE — 1160F RVW MEDS BY RX/DR IN RCRD: CPT | Performed by: NURSE PRACTITIONER

## 2023-01-04 NOTE — PROGRESS NOTES
Emre Lisa  1960  62 y.o. male   PCP:Rasheed Bravo 7/19/2022   BS: 104 per patient       01/04/2023    Chief Complaint   Patient presents with   • Left Foot - Wound Check, Foot Swelling       History of Present Illness    Emre Lisa is a 62 y.o.male came to clinic for concern of left foot swelling and left ankle fracture. Patient was placed in a unna boot.           Past Medical History:   Diagnosis Date   • Arthritis    • Atrial fibrillation (HCC)    • Diabetes mellitus (HCC)    • Diabetic foot ulcer associated with type 2 diabetes mellitus (HCC)     left great toe   • Hallux malleus of left foot    • Hammer toe    • Hypertension    • MI (myocardial infarction) (HCC)    • Neuropathy in diabetes (HCC)    • Onychomycosis    • Presence of biventricular cardiac pacemaker    • Rheumatoid arthritis (HCC)          Past Surgical History:   Procedure Laterality Date   • AMPUTATION DIGIT Right 3/5/2017    Procedure: RIGHT AMPUTATION TRANSMETATRASAL , RECESSION GASTROCNEMOUS;  Surgeon: Marco A Thompson DPM;  Location: [facility];  Service:    • CARDIAC DEFIBRILLATOR PLACEMENT  2010, 2016   • CARPAL TUNNEL RELEASE  2015    elbow and wrist left arm   • FOOT IRRIGATION, DEBRIDEMENT AND REPAIR Left 3/7/2018    Procedure: FOOT IRRIGATION, DEBRIDEMENT AND REPAIR;  Surgeon: Marco A Thompson DPM;  Location: [facility];  Service:    • FOOT IRRIGATION, DEBRIDEMENT AND REPAIR Left 3/10/2018    Procedure: FOOT IRRIGATION, DEBRIDEMENT AND REPAIR;  Surgeon: Marco A Thompson DPM;  Location: [facility] OR;  Service: Podiatry   • FOOT WOUND CLOSURE Right 3/2/2017    Procedure: INCISION AND DRAINAGE, RIGHT FOOT;  Surgeon: Tung Rosenthal DPM;  Location: [facility];  Service:    • HAMMER TOE REPAIR Left 2/15/2018    Procedure: HAMMERTOE CORRECTION LEFT SECOND, THIRD AND FOURTH TOES AND HALLUX INTERPHALANGEAL JOINT ARTHRODESIS LEFT FOOT (Micro Asnis, 4.0 & 5.0 Asnis)      (c-arm);  Surgeon: Marco A Thompson DPM;  Location: [facility] OR;   Service:    • HARDWARE REMOVAL Left 3/5/2018    Procedure: ANKLE/FOOT HARDWARE REMOVAL;  Surgeon: Marco A Thompson DPM;  Location: [facility] OR;  Service:    • INCISION AND DRAINAGE LEG Left 3/5/2018    Procedure: INCISION AND DRAINAGE LOWER EXTREMITY;  Surgeon: Marco A Thompson DPM;  Location: [facility];  Service:    • PLANTAR FASCIA RELEASE Left 11/21/2019    Procedure: PLANTAR PLANING LEFT FOOT AND ALL OTHER INDICATED PROCEDURES;  Surgeon: Marco A Thompson DPM;  Location: [facility];  Service: Podiatry   • TOE AMPUTATION Right 2016   • TONSILECTOMY, ADENOIDECTOMY, BILATERAL MYRINGOTOMY AND TUBES      age 25         Family History   Problem Relation Age of Onset   • Diabetes Father    • Stroke Father    • No Known Problems Maternal Grandmother    • No Known Problems Maternal Grandfather    • No Known Problems Paternal Grandmother    • No Known Problems Paternal Grandfather    • No Known Problems Daughter    • No Known Problems Daughter    • No Known Problems Son    • Heart disease Other    • Hypertension Other        Allergies   Allergen Reactions   • Penicillins Anaphylaxis   • Januvia [Sitagliptin] Hives   • Lipitor [Atorvastatin] Other (See Comments)     Muscle Cramps...   • Shellfish Allergy Other (See Comments)   • Peanut Oil Rash   • Peanut-Containing Drug Products Rash   • Sulfa Antibiotics Rash       Social History     Socioeconomic History   • Marital status:    Tobacco Use   • Smoking status: Never   • Smokeless tobacco: Never   Vaping Use   • Vaping Use: Never used   Substance and Sexual Activity   • Alcohol use: Yes     Comment: social   • Drug use: No   • Sexual activity: Defer         Current Outpatient Medications   Medication Sig Dispense Refill   • ascorbic acid (VITAMIN C) 1000 MG tablet Take 1,000 mg by mouth Daily.     • Blood Glucose Monitoring Suppl (ONE TOUCH ULTRA 2) w/Device kit      • Cholecalciferol 125 MCG (5000 UT) tablet Take 5,000 Units by mouth Daily.     • doxycycline  (VIBRAMYCIN) 100 MG capsule Take 1 capsule by mouth 2 (Two) Times a Day. 20 capsule 0   • dronedarone (MULTAQ) 400 MG tablet Take 400 mg by mouth Every Night.     • fexofenadine (ALLEGRA) 180 MG tablet Take 1 tablet by mouth Daily.     • fluticasone (FLONASE) 50 MCG/ACT nasal spray 2 sprays into each nostril As Needed for Rhinitis.     • glimepiride (AMARYL) 4 MG tablet Take 4 mg by mouth.     • losartan (COZAAR) 25 MG tablet Take 25 mg by mouth Daily.     • magnesium oxide (MAG-OX) 400 MG tablet      • metFORMIN (GLUCOPHAGE) 1000 MG tablet Take 1 tablet by mouth 2 (Two) Times a Day With Meals.     • metoprolol succinate XL (TOPROL-XL) 25 MG 24 hr tablet Take 25 mg by mouth Every Night. 0.5 tablet     • Multiple Vitamins-Minerals (ZINC PO) Take  by mouth.     • multivitamin (THERAGRAN) tablet tablet Take  by mouth Daily.     • ONE TOUCH ULTRA TEST test strip USE TO TEST BLOOD SUGAR THREE TIMES A DAY  1   • HYDROcodone-acetaminophen (NORCO) 7.5-325 MG per tablet Take 1 tablet by mouth Every 6 (Six) Hours As Needed for Moderate Pain. 12 tablet 0   • vitamin C (ASCORBIC ACID) 500 MG tablet Take 500 mg by mouth Every Night.       No current facility-administered medications for this visit.       Review of Systems   Constitutional: Negative.    HENT: Negative.    Eyes: Negative.    Respiratory: Negative.    Cardiovascular: Negative.    Gastrointestinal: Negative.    Endocrine: Negative.    Genitourinary: Negative.    Musculoskeletal:        Foot pain    Skin: Negative.    Allergic/Immunologic: Negative.    Neurological: Negative.    Hematological: Negative.    Psychiatric/Behavioral: Negative.          OBJECTIVE    Pulse 98   Ht 193 cm (76\")   Wt (!) 164 kg (362 lb)   SpO2 99%   BMI 44.06 kg/m²     Physical Exam  Vitals reviewed.   Constitutional:       Appearance: Normal appearance. He is well-developed.   HENT:      Head: Normocephalic and atraumatic.   Neck:      Trachea: Trachea and phonation normal.    Cardiovascular:      Pulses:           Dorsalis pedis pulses are detected w/ Doppler on the left side.        Posterior tibial pulses are detected w/ Doppler on the left side.   Pulmonary:      Effort: Pulmonary effort is normal. No respiratory distress.   Abdominal:      General: There is no distension.      Palpations: Abdomen is soft.   Musculoskeletal:        Feet:    Feet:      Left foot:      Skin integrity: Ulcer present.   Skin:     General: Skin is warm and dry.   Neurological:      Mental Status: He is alert and oriented to person, place, and time.      GCS: GCS eye subscore is 4. GCS verbal subscore is 5. GCS motor subscore is 6.   Psychiatric:         Speech: Speech normal.         Behavior: Behavior normal. Behavior is cooperative.         Thought Content: Thought content normal.         Judgment: Judgment normal.                Procedures        ASSESSMENT AND PLAN    Diagnoses and all orders for this visit:    1. Charcot's joint, left ankle and foot (Primary)  -     XR Ankle 3+ View Left  -     US ankle / brachial indices extremity complete; Future    2. Wound of left lower extremity, initial encounter  -     XR Ankle 3+ View Left  -     US ankle / brachial indices extremity complete; Future    3. Lower extremity edema  -     XR Ankle 3+ View Left  -     US ankle / brachial indices extremity complete; Future    4. Onychomycosis  -     US ankle / brachial indices extremity complete; Future    5. Claudication (HCC)  -     US ankle / brachial indices extremity complete; Future      Donna examined the patient after I did recommending application of the Unna boot's and a postoperative shoe with continue modified activity, rest, elevation and follow-up next week for wound recheck.  Patient verbalized understanding plan of care.  We will also obtain ABIs to evaluate circulatory status bilaterally.  He is instructed to contact the office for worsening symptoms.  Unna boot was applied prior to the patient's  discharge tolerated the procedure well he was neurovascularly intact          This document has been electronically signed by Lito WATSON, FNP-C, ONP-C on January 4, 2023 11:20 CST

## 2023-01-11 ENCOUNTER — OFFICE VISIT (OUTPATIENT)
Dept: PODIATRY | Facility: CLINIC | Age: 63
End: 2023-01-11
Payer: MEDICARE

## 2023-01-11 VITALS — WEIGHT: 315 LBS | BODY MASS INDEX: 38.36 KG/M2 | HEIGHT: 76 IN

## 2023-01-11 DIAGNOSIS — E11.42 TYPE 2 DIABETES MELLITUS WITH PERIPHERAL NEUROPATHY: ICD-10-CM

## 2023-01-11 DIAGNOSIS — R60.0 LOWER EXTREMITY EDEMA: ICD-10-CM

## 2023-01-11 DIAGNOSIS — S81.802A WOUND OF LEFT LOWER EXTREMITY, INITIAL ENCOUNTER: ICD-10-CM

## 2023-01-11 DIAGNOSIS — Z89.431 S/P TRANSMETATARSAL AMPUTATION OF FOOT, RIGHT: ICD-10-CM

## 2023-01-11 DIAGNOSIS — M14.672 CHARCOT'S JOINT, LEFT ANKLE AND FOOT: Primary | ICD-10-CM

## 2023-01-11 PROCEDURE — 99213 OFFICE O/P EST LOW 20 MIN: CPT | Performed by: NURSE PRACTITIONER

## 2023-01-11 NOTE — PROGRESS NOTES
Emre Lisa  1960  62 y.o. male   PCP:Rasheed Bravo 7/19/2022   BS: 115 per patient       01/11/2023      Chief Complaint   Patient presents with   • Left Foot - Wound Check       History of Present Illness    Emre Lisa is a 62 y.o.male came to clinic for wound recheck on left foot.          Past Medical History:   Diagnosis Date   • Arthritis    • Atrial fibrillation (HCC)    • Diabetes mellitus (HCC)    • Diabetic foot ulcer associated with type 2 diabetes mellitus (HCC)     left great toe   • Hallux malleus of left foot    • Hammer toe    • Hypertension    • MI (myocardial infarction) (HCC)    • Neuropathy in diabetes (HCC)    • Onychomycosis    • Presence of biventricular cardiac pacemaker    • Rheumatoid arthritis (HCC)          Past Surgical History:   Procedure Laterality Date   • AMPUTATION DIGIT Right 3/5/2017    Procedure: RIGHT AMPUTATION TRANSMETATRASAL , RECESSION GASTROCNEMOUS;  Surgeon: Marco A Thompson DPM;  Location: [facility];  Service:    • CARDIAC DEFIBRILLATOR PLACEMENT  2010, 2016   • CARPAL TUNNEL RELEASE  2015    elbow and wrist left arm   • FOOT IRRIGATION, DEBRIDEMENT AND REPAIR Left 3/7/2018    Procedure: FOOT IRRIGATION, DEBRIDEMENT AND REPAIR;  Surgeon: Marco A Thompson DPM;  Location: [facility];  Service:    • FOOT IRRIGATION, DEBRIDEMENT AND REPAIR Left 3/10/2018    Procedure: FOOT IRRIGATION, DEBRIDEMENT AND REPAIR;  Surgeon: Marco A Thompson DPM;  Location: [facility];  Service: Podiatry   • FOOT WOUND CLOSURE Right 3/2/2017    Procedure: INCISION AND DRAINAGE, RIGHT FOOT;  Surgeon: Tung Rosenthal DPM;  Location: [facility] OR;  Service:    • HAMMER TOE REPAIR Left 2/15/2018    Procedure: HAMMERTOE CORRECTION LEFT SECOND, THIRD AND FOURTH TOES AND HALLUX INTERPHALANGEAL JOINT ARTHRODESIS LEFT FOOT (Micro Asnis, 4.0 & 5.0 Asnis)      (c-arm);  Surgeon: Marco A Thompson DPM;  Location: [facility];  Service:    • HARDWARE REMOVAL Left 3/5/2018    Procedure: ANKLE/FOOT  HARDWARE REMOVAL;  Surgeon: Marco A Thompson DPM;  Location: [facility] OR;  Service:    • INCISION AND DRAINAGE LEG Left 3/5/2018    Procedure: INCISION AND DRAINAGE LOWER EXTREMITY;  Surgeon: Marco A Thompson DPM;  Location: [facility] OR;  Service:    • PLANTAR FASCIA RELEASE Left 11/21/2019    Procedure: PLANTAR PLANING LEFT FOOT AND ALL OTHER INDICATED PROCEDURES;  Surgeon: Marco A Thompson DPM;  Location: [facility] OR;  Service: Podiatry   • TOE AMPUTATION Right 2016   • TONSILECTOMY, ADENOIDECTOMY, BILATERAL MYRINGOTOMY AND TUBES      age 25         Family History   Problem Relation Age of Onset   • Diabetes Father    • Stroke Father    • No Known Problems Maternal Grandmother    • No Known Problems Maternal Grandfather    • No Known Problems Paternal Grandmother    • No Known Problems Paternal Grandfather    • No Known Problems Daughter    • No Known Problems Daughter    • No Known Problems Son    • Heart disease Other    • Hypertension Other        Allergies   Allergen Reactions   • Penicillins Anaphylaxis   • Januvia [Sitagliptin] Hives   • Lipitor [Atorvastatin] Other (See Comments)     Muscle Cramps...   • Shellfish Allergy Other (See Comments)   • Peanut Oil Rash   • Peanut-Containing Drug Products Rash   • Sulfa Antibiotics Rash       Social History     Socioeconomic History   • Marital status:    Tobacco Use   • Smoking status: Never   • Smokeless tobacco: Never   Vaping Use   • Vaping Use: Never used   Substance and Sexual Activity   • Alcohol use: Yes     Comment: social   • Drug use: No   • Sexual activity: Defer         Current Outpatient Medications   Medication Sig Dispense Refill   • ascorbic acid (VITAMIN C) 1000 MG tablet Take 1,000 mg by mouth Daily.     • Blood Glucose Monitoring Suppl (ONE TOUCH ULTRA 2) w/Device kit      • Cholecalciferol 125 MCG (5000 UT) tablet Take 5,000 Units by mouth Daily.     • doxycycline (VIBRAMYCIN) 100 MG capsule Take 1 capsule by mouth 2 (Two) Times a Day. 20  "capsule 0   • dronedarone (MULTAQ) 400 MG tablet Take 400 mg by mouth Every Night.     • fexofenadine (ALLEGRA) 180 MG tablet Take 1 tablet by mouth Daily.     • fluticasone (FLONASE) 50 MCG/ACT nasal spray 2 sprays into each nostril As Needed for Rhinitis.     • glimepiride (AMARYL) 4 MG tablet Take 4 mg by mouth.     • losartan (COZAAR) 25 MG tablet Take 25 mg by mouth Daily.     • magnesium oxide (MAG-OX) 400 MG tablet      • metFORMIN (GLUCOPHAGE) 1000 MG tablet Take 1 tablet by mouth 2 (Two) Times a Day With Meals.     • metoprolol succinate XL (TOPROL-XL) 25 MG 24 hr tablet Take 25 mg by mouth Every Night. 0.5 tablet     • Multiple Vitamins-Minerals (ZINC PO) Take  by mouth.     • multivitamin (THERAGRAN) tablet tablet Take  by mouth Daily.     • ONE TOUCH ULTRA TEST test strip USE TO TEST BLOOD SUGAR THREE TIMES A DAY  1   • HYDROcodone-acetaminophen (NORCO) 7.5-325 MG per tablet Take 1 tablet by mouth Every 6 (Six) Hours As Needed for Moderate Pain. 12 tablet 0   • vitamin C (ASCORBIC ACID) 500 MG tablet Take 500 mg by mouth Every Night.       No current facility-administered medications for this visit.       Review of Systems   Constitutional: Negative.    HENT: Negative.    Eyes: Negative.    Respiratory: Negative.    Cardiovascular: Negative.    Gastrointestinal: Negative.    Endocrine: Negative.    Genitourinary: Negative.    Musculoskeletal:        Foot pain    Skin: Negative.    Allergic/Immunologic: Negative.    Neurological: Negative.    Hematological: Negative.    Psychiatric/Behavioral: Negative.          OBJECTIVE    Ht 193 cm (76\")   Wt (!) 164 kg (362 lb)   BMI 44.06 kg/m²     Physical Exam  Vitals reviewed.   Constitutional:       Appearance: Normal appearance. He is well-developed.   HENT:      Head: Normocephalic and atraumatic.   Neck:      Trachea: Trachea and phonation normal.   Cardiovascular:      Pulses:           Dorsalis pedis pulses are detected w/ Doppler on the left side.        " Posterior tibial pulses are detected w/ Doppler on the left side.   Pulmonary:      Effort: Pulmonary effort is normal. No respiratory distress.   Abdominal:      General: There is no distension.      Palpations: Abdomen is soft.   Musculoskeletal:        Feet:    Feet:      Left foot:      Skin integrity: Ulcer present.   Skin:     General: Skin is warm and dry.   Neurological:      Mental Status: He is alert and oriented to person, place, and time.      GCS: GCS eye subscore is 4. GCS verbal subscore is 5. GCS motor subscore is 6.   Psychiatric:         Speech: Speech normal.         Behavior: Behavior normal. Behavior is cooperative.         Thought Content: Thought content normal.         Judgment: Judgment normal.          XR Ankle 3+ View Left    Result Date: 1/5/2023  Narrative: Three view left ankle HISTORY: Left ankle pain. Charcot joint. Open wound left lower leg. AP, lateral and oblique views obtained. COMPARISON: December 15, 2022 FINDINGS: Cast now in place. There are now dystrophic calcifications or ossifications ventral to the distal leg. Appearance of the ankle and midfoot has not changed. Again there is fragmentation and collapse of the talus. Degenerative changes midfoot with osteophytes and joint space narrowing. Calcaneal spurs. Plantar ossifications. No other osseous or articular abnormality. Atherosclerotic calcifications.     Impression: CONCLUSION: As above [id number] Electronically signed by:  Matias Waller MD  1/5/2023 4:25 PM CST Workstation: [id number]    XR Ankle 3+ View Left    Result Date: 12/19/2022  Narrative: EXAM: XR ANKLE 3 OR MORE VIEWS LEFT HISTORY: pain, M25.572 Pain in left ankle and joints of left foot G89.29 Other chronic pain. COMPARISON: January 7, 2022 FINDINGS: Mineralization: Osseous demineralization Bones: Marked joint space narrowing, disorganization, erosions and debris at the left mid foot. Tibiotalar joint space narrowing. Anterior tibial osteophyte and multiple loose  bodies. Posterior calcaneal enthesophyte. Large plantar calcaneal spur. Chronic deformities of the second through fourth metatarsal neck Soft tissues: Vascular calcifications. Plantar fascia calcifications.     Impression: Neuropathic arthropathy Electronically signed by:  Jono Mendoza DO  12/19/2022 6:57 AM CST Workstation: [id number]    CT Lower Extremity Left Without Contrast    Result Date: 12/16/2022  Narrative: INDICATION: foot pain and swelling with recent injury with DM and charcot foot EXAMINATION: CT BONE - CT LOWER EXTREMITY WITHOUT IV CONTRAST TECHNIQUE: Helically acquired images were obtained of the left foot and agent:. 2-D reformats were performed by the technologist. A radiation dose optimization technique was used for this scan. IV Contrast dosage and agent: None. COMPARISON: Plain films 12/18/2021 ____________________________________________ FINDINGS: SOFT TISSUES: There is diffuse soft tissue swelling. There is no soft tissue gas or discrete fluid collection.. There is calcification associated with the flexor and peroneal tendons. BONES/JOINTS: There is no evidence of acute fracture. There is severe bony fragmentation and destruction in the midfoot. There is flattening of the plantar arch with rocker-bottom foot deformity. There is no bone erosion to suggest osteomyelitis. Changes are most consistent with neuropathic foot. The fragmentation of the talus however appears more acute suggesting an acute comminuted fracture of the talus. Multiple fracture lines enter the talar dome with disruption of the articular surface. The distal tibia and fibula are intact.     Impression: 1.  Destruction and sclerosis of the mid foot suggesting neuropathic foot with rocker-bottom deformity. 2.  There is also fragmentation of the talar dome which appears more acute. Possibility of acute talar fracture should be considered. Electronically signed by:  Man Perez MD  12/16/2022 10:57 PM CST Workstation:  [id number]    .  Reviewed PRITI    Procedures        ASSESSMENT AND PLAN    Diagnoses and all orders for this visit:    1. Charcot's joint, left ankle and foot (Primary)    2. S/P transmetatarsal amputation of foot, right (HCC)    3. Type 2 diabetes mellitus with peripheral neuropathy (HCC)    4. Lower extremity edema    5. Wound of left lower extremity, initial encounter    Some improvement in the wound size with notable granulating tissue on the medial side of the ankle.  Anterior erythema has resolved as well.  Patient had his PRITI which I reviewed today in office and he was placed back into an Unna boot and a postop shoe prior to discharge with follow-up planned next week for wound recheck.              This document has been electronically signed by Lito WATSON, FNP-C, ONP-C on January 11, 2023 12:38 CST

## 2023-01-18 ENCOUNTER — OFFICE VISIT (OUTPATIENT)
Dept: PODIATRY | Facility: CLINIC | Age: 63
End: 2023-01-18
Payer: MEDICARE

## 2023-01-18 VITALS — WEIGHT: 315 LBS | OXYGEN SATURATION: 97 % | HEART RATE: 68 BPM | BODY MASS INDEX: 38.36 KG/M2 | HEIGHT: 76 IN

## 2023-01-18 DIAGNOSIS — L97.521 SKIN ULCER OF LEFT FOOT, LIMITED TO BREAKDOWN OF SKIN: ICD-10-CM

## 2023-01-18 DIAGNOSIS — M14.672 CHARCOT'S JOINT OF LEFT FOOT: ICD-10-CM

## 2023-01-18 DIAGNOSIS — E11.621 TYPE 2 DIABETES MELLITUS WITH FOOT ULCER, UNSPECIFIED WHETHER LONG TERM INSULIN USE: Primary | ICD-10-CM

## 2023-01-18 DIAGNOSIS — L97.509 TYPE 2 DIABETES MELLITUS WITH FOOT ULCER, UNSPECIFIED WHETHER LONG TERM INSULIN USE: Primary | ICD-10-CM

## 2023-01-18 PROCEDURE — 29445 APPL RIGID TOT CNTC LEG CAST: CPT | Performed by: NURSE PRACTITIONER

## 2023-01-18 PROCEDURE — 99212 OFFICE O/P EST SF 10 MIN: CPT | Performed by: NURSE PRACTITIONER

## 2023-01-18 NOTE — PROGRESS NOTES
Emre Lisa  1960  62 y.o. male   PCP:Rasheed Bravo 7/19/2022   BS: 121 per patient       01/18/2023      Chief Complaint   Patient presents with   • Left Foot - Follow-up   • Left Lower Leg - Follow-up   • Left Ankle - Follow-up       History of Present Illness    Emre Lisa is a 62 y.o.male came to clinic for wound recheck on left foot.    Patient reports that he slipped earlier this week however has not experienced any worsening pain.  He currently denies any fever, chills or evidence of infection          Past Medical History:   Diagnosis Date   • Arthritis    • Atrial fibrillation (HCC)    • Diabetes mellitus (HCC)    • Diabetic foot ulcer associated with type 2 diabetes mellitus (HCC)     left great toe   • Hallux malleus of left foot    • Hammer toe    • Hypertension    • MI (myocardial infarction) (HCC)    • Neuropathy in diabetes (HCC)    • Onychomycosis    • Presence of biventricular cardiac pacemaker    • Rheumatoid arthritis (HCC)          Past Surgical History:   Procedure Laterality Date   • AMPUTATION DIGIT Right 3/5/2017    Procedure: RIGHT AMPUTATION TRANSMETATRASAL , RECESSION GASTROCNEMOUS;  Surgeon: Marco A Thompson DPM;  Location: [facility];  Service:    • CARDIAC DEFIBRILLATOR PLACEMENT  2010, 2016   • CARPAL TUNNEL RELEASE  2015    elbow and wrist left arm   • FOOT IRRIGATION, DEBRIDEMENT AND REPAIR Left 3/7/2018    Procedure: FOOT IRRIGATION, DEBRIDEMENT AND REPAIR;  Surgeon: Marco A Thompson DPM;  Location: [facility];  Service:    • FOOT IRRIGATION, DEBRIDEMENT AND REPAIR Left 3/10/2018    Procedure: FOOT IRRIGATION, DEBRIDEMENT AND REPAIR;  Surgeon: Marco A Thompson DPM;  Location: [facility] OR;  Service: Podiatry   • FOOT WOUND CLOSURE Right 3/2/2017    Procedure: INCISION AND DRAINAGE, RIGHT FOOT;  Surgeon: Tung Rosenthal DPM;  Location: [facility];  Service:    • HAMMER TOE REPAIR Left 2/15/2018    Procedure: HAMMERTOE CORRECTION LEFT SECOND, THIRD AND FOURTH TOES AND  HALLUX INTERPHALANGEAL JOINT ARTHRODESIS LEFT FOOT (Micro Asnis, 4.0 & 5.0 Asnis)      (c-arm);  Surgeon: Marco A Thompson DPM;  Location: [facility];  Service:    • HARDWARE REMOVAL Left 3/5/2018    Procedure: ANKLE/FOOT HARDWARE REMOVAL;  Surgeon: Marco A Thompson DPM;  Location: [facility] OR;  Service:    • INCISION AND DRAINAGE LEG Left 3/5/2018    Procedure: INCISION AND DRAINAGE LOWER EXTREMITY;  Surgeon: Marco A Thompson DPM;  Location: [facility] OR;  Service:    • PLANTAR FASCIA RELEASE Left 11/21/2019    Procedure: PLANTAR PLANING LEFT FOOT AND ALL OTHER INDICATED PROCEDURES;  Surgeon: Marco A Thompson DPM;  Location: [facility];  Service: Podiatry   • TOE AMPUTATION Right 2016   • TONSILECTOMY, ADENOIDECTOMY, BILATERAL MYRINGOTOMY AND TUBES      age 25         Family History   Problem Relation Age of Onset   • Diabetes Father    • Stroke Father    • No Known Problems Maternal Grandmother    • No Known Problems Maternal Grandfather    • No Known Problems Paternal Grandmother    • No Known Problems Paternal Grandfather    • No Known Problems Daughter    • No Known Problems Daughter    • No Known Problems Son    • Heart disease Other    • Hypertension Other        Allergies   Allergen Reactions   • Penicillins Anaphylaxis   • Januvia [Sitagliptin] Hives   • Lipitor [Atorvastatin] Other (See Comments)     Muscle Cramps...   • Shellfish Allergy Other (See Comments)   • Peanut Oil Rash   • Peanut-Containing Drug Products Rash   • Sulfa Antibiotics Rash       Social History     Socioeconomic History   • Marital status:    Tobacco Use   • Smoking status: Never   • Smokeless tobacco: Never   Vaping Use   • Vaping Use: Never used   Substance and Sexual Activity   • Alcohol use: Yes     Comment: social   • Drug use: No   • Sexual activity: Defer         Current Outpatient Medications   Medication Sig Dispense Refill   • ascorbic acid (VITAMIN C) 1000 MG tablet Take 1,000 mg by mouth Daily.     • Blood Glucose Monitoring  "Suppl (ONE TOUCH ULTRA 2) w/Device kit      • Cholecalciferol 125 MCG (5000 UT) tablet Take 5,000 Units by mouth Daily.     • doxycycline (VIBRAMYCIN) 100 MG capsule Take 1 capsule by mouth 2 (Two) Times a Day. 20 capsule 0   • dronedarone (MULTAQ) 400 MG tablet Take 400 mg by mouth Every Night.     • fexofenadine (ALLEGRA) 180 MG tablet Take 1 tablet by mouth Daily.     • fluticasone (FLONASE) 50 MCG/ACT nasal spray 2 sprays into each nostril As Needed for Rhinitis.     • glimepiride (AMARYL) 4 MG tablet Take 4 mg by mouth.     • losartan (COZAAR) 25 MG tablet Take 25 mg by mouth Daily.     • magnesium oxide (MAG-OX) 400 MG tablet      • metFORMIN (GLUCOPHAGE) 1000 MG tablet Take 1 tablet by mouth 2 (Two) Times a Day With Meals.     • metoprolol succinate XL (TOPROL-XL) 25 MG 24 hr tablet Take 25 mg by mouth Every Night. 0.5 tablet     • Multiple Vitamins-Minerals (ZINC PO) Take  by mouth.     • multivitamin (THERAGRAN) tablet tablet Take  by mouth Daily.     • ONE TOUCH ULTRA TEST test strip USE TO TEST BLOOD SUGAR THREE TIMES A DAY  1   • HYDROcodone-acetaminophen (NORCO) 7.5-325 MG per tablet Take 1 tablet by mouth Every 6 (Six) Hours As Needed for Moderate Pain. 12 tablet 0   • vitamin C (ASCORBIC ACID) 500 MG tablet Take 500 mg by mouth Every Night.       No current facility-administered medications for this visit.       Review of Systems   Constitutional: Negative.    HENT: Negative.    Eyes: Negative.    Respiratory: Negative.    Cardiovascular: Negative.    Gastrointestinal: Negative.    Endocrine: Negative.    Genitourinary: Negative.    Musculoskeletal:        Foot pain    Skin: Negative.    Allergic/Immunologic: Negative.    Neurological: Negative.    Hematological: Negative.    Psychiatric/Behavioral: Negative.          OBJECTIVE    Pulse 68   Ht 193 cm (76\")   Wt (!) 164 kg (362 lb)   SpO2 97%   BMI 44.06 kg/m²     Physical Exam  Vitals reviewed.   Constitutional:       Appearance: Normal " appearance. He is well-developed.   HENT:      Head: Normocephalic and atraumatic.   Neck:      Trachea: Trachea and phonation normal.   Cardiovascular:      Pulses:           Dorsalis pedis pulses are detected w/ Doppler on the left side.        Posterior tibial pulses are detected w/ Doppler on the left side.   Pulmonary:      Effort: Pulmonary effort is normal. No respiratory distress.   Abdominal:      General: There is no distension.      Palpations: Abdomen is soft.   Musculoskeletal:        Feet:    Feet:      Left foot:      Skin integrity: Ulcer present.   Skin:     General: Skin is warm and dry.   Neurological:      Mental Status: He is alert and oriented to person, place, and time.      GCS: GCS eye subscore is 4. GCS verbal subscore is 5. GCS motor subscore is 6.   Psychiatric:         Speech: Speech normal.         Behavior: Behavior normal. Behavior is cooperative.         Thought Content: Thought content normal.         Judgment: Judgment normal.          XR Ankle 3+ View Left    Result Date: 1/5/2023  Narrative: Three view left ankle HISTORY: Left ankle pain. Charcot joint. Open wound left lower leg. AP, lateral and oblique views obtained. COMPARISON: December 15, 2022 FINDINGS: Cast now in place. There are now dystrophic calcifications or ossifications ventral to the distal leg. Appearance of the ankle and midfoot has not changed. Again there is fragmentation and collapse of the talus. Degenerative changes midfoot with osteophytes and joint space narrowing. Calcaneal spurs. Plantar ossifications. No other osseous or articular abnormality. Atherosclerotic calcifications.     Impression: CONCLUSION: As above [id number] Electronically signed by:  Matias Waller MD  1/5/2023 4:25 PM CST Workstation: [id number]    .  Reviewed PRITI    Procedures       ASSESSMENT AND PLAN     Diagnoses and all orders for this visit:     1. Charcot's joint, left ankle and foot (Primary)     2. S/P transmetatarsal amputation of foot,  right (HCC)     3. Type 2 diabetes mellitus with peripheral neuropathy (HCC)     4. Lower extremity edema     5. Wound of left lower extremity, initial encounter    Place the patient in a TCC cast today tolerated well.  Recommend follow-up in 1 week for recheck of the wounds and most likely reapplication of a new cast.  He verbalized understanding of the plan of care was given information about cast care by the staff prior to discharge.  He left ambulatory without difficulty.          This document has been electronically signed by Lito WATSON, FNP-C, ONP-C on January 18, 2023 12:04 CST

## 2023-01-25 ENCOUNTER — OFFICE VISIT (OUTPATIENT)
Dept: PODIATRY | Facility: CLINIC | Age: 63
End: 2023-01-25
Payer: MEDICARE

## 2023-01-25 VITALS — OXYGEN SATURATION: 96 % | HEART RATE: 94 BPM | HEIGHT: 76 IN | WEIGHT: 315 LBS | BODY MASS INDEX: 38.36 KG/M2

## 2023-01-25 DIAGNOSIS — L97.509 TYPE 2 DIABETES MELLITUS WITH FOOT ULCER, UNSPECIFIED WHETHER LONG TERM INSULIN USE: ICD-10-CM

## 2023-01-25 DIAGNOSIS — M14.672 CHARCOT'S JOINT OF LEFT FOOT: ICD-10-CM

## 2023-01-25 DIAGNOSIS — E11.621 TYPE 2 DIABETES MELLITUS WITH FOOT ULCER, UNSPECIFIED WHETHER LONG TERM INSULIN USE: ICD-10-CM

## 2023-01-25 DIAGNOSIS — L97.521 SKIN ULCER OF LEFT FOOT, LIMITED TO BREAKDOWN OF SKIN: Primary | ICD-10-CM

## 2023-01-25 PROCEDURE — 29445 APPL RIGID TOT CNTC LEG CAST: CPT | Performed by: NURSE PRACTITIONER

## 2023-01-25 PROCEDURE — 99212 OFFICE O/P EST SF 10 MIN: CPT | Performed by: NURSE PRACTITIONER

## 2023-01-25 NOTE — PROGRESS NOTES
Emre Lisa  1960  62 y.o. male   PCP:Rasheed Bravo 7/19/2022   BS: 120 per patient       1/25/2023    Chief Complaint   Patient presents with   • Left Ankle - Follow-up       History of Present Illness    Emre Lisa is a 62 y.o.male came to clinic for wound recheck on left foot. Patient was place in a T.C.C. Patient seem to be doing well in cast.               Past Medical History:   Diagnosis Date   • Arthritis    • Atrial fibrillation (HCC)    • Diabetes mellitus (HCC)    • Diabetic foot ulcer associated with type 2 diabetes mellitus (HCC)     left great toe   • Hallux malleus of left foot    • Hammer toe    • Hypertension    • MI (myocardial infarction) (HCC)    • Neuropathy in diabetes (HCC)    • Onychomycosis    • Presence of biventricular cardiac pacemaker    • Rheumatoid arthritis (HCC)          Past Surgical History:   Procedure Laterality Date   • AMPUTATION DIGIT Right 3/5/2017    Procedure: RIGHT AMPUTATION TRANSMETATRASAL , RECESSION GASTROCNEMOUS;  Surgeon: Marco A Thompson DPM;  Location: [facility];  Service:    • CARDIAC DEFIBRILLATOR PLACEMENT  2010, 2016   • CARPAL TUNNEL RELEASE  2015    elbow and wrist left arm   • FOOT IRRIGATION, DEBRIDEMENT AND REPAIR Left 3/7/2018    Procedure: FOOT IRRIGATION, DEBRIDEMENT AND REPAIR;  Surgeon: Marco A Thompson DPM;  Location: [facility];  Service:    • FOOT IRRIGATION, DEBRIDEMENT AND REPAIR Left 3/10/2018    Procedure: FOOT IRRIGATION, DEBRIDEMENT AND REPAIR;  Surgeon: Marco A Thompson DPM;  Location: [facility];  Service: Podiatry   • FOOT WOUND CLOSURE Right 3/2/2017    Procedure: INCISION AND DRAINAGE, RIGHT FOOT;  Surgeon: Tung Rosenthal DPM;  Location: [facility];  Service:    • HAMMER TOE REPAIR Left 2/15/2018    Procedure: HAMMERTOE CORRECTION LEFT SECOND, THIRD AND FOURTH TOES AND HALLUX INTERPHALANGEAL JOINT ARTHRODESIS LEFT FOOT (Micro Asnis, 4.0 & 5.0 Asnis)      (c-arm);  Surgeon: Marco A Thompson DPM;  Location: [facility];   Service:    • HARDWARE REMOVAL Left 3/5/2018    Procedure: ANKLE/FOOT HARDWARE REMOVAL;  Surgeon: Marco A Thompson DPM;  Location: [facility] OR;  Service:    • INCISION AND DRAINAGE LEG Left 3/5/2018    Procedure: INCISION AND DRAINAGE LOWER EXTREMITY;  Surgeon: Marco A Thompson DPM;  Location: [facility] OR;  Service:    • PLANTAR FASCIA RELEASE Left 11/21/2019    Procedure: PLANTAR PLANING LEFT FOOT AND ALL OTHER INDICATED PROCEDURES;  Surgeon: Marco A Thompson DPM;  Location: [facility];  Service: Podiatry   • TOE AMPUTATION Right 2016   • TONSILECTOMY, ADENOIDECTOMY, BILATERAL MYRINGOTOMY AND TUBES      age 25         Family History   Problem Relation Age of Onset   • Diabetes Father    • Stroke Father    • No Known Problems Maternal Grandmother    • No Known Problems Maternal Grandfather    • No Known Problems Paternal Grandmother    • No Known Problems Paternal Grandfather    • No Known Problems Daughter    • No Known Problems Daughter    • No Known Problems Son    • Heart disease Other    • Hypertension Other        Allergies   Allergen Reactions   • Penicillins Anaphylaxis   • Januvia [Sitagliptin] Hives   • Lipitor [Atorvastatin] Other (See Comments)     Muscle Cramps...   • Shellfish Allergy Other (See Comments)   • Peanut Oil Rash   • Peanut-Containing Drug Products Rash   • Sulfa Antibiotics Rash       Social History     Socioeconomic History   • Marital status:    Tobacco Use   • Smoking status: Never   • Smokeless tobacco: Never   Vaping Use   • Vaping Use: Never used   Substance and Sexual Activity   • Alcohol use: Yes     Comment: social   • Drug use: No   • Sexual activity: Defer         Current Outpatient Medications   Medication Sig Dispense Refill   • ascorbic acid (VITAMIN C) 1000 MG tablet Take 1,000 mg by mouth Daily.     • Blood Glucose Monitoring Suppl (ONE TOUCH ULTRA 2) w/Device kit      • Cholecalciferol 125 MCG (5000 UT) tablet Take 5,000 Units by mouth Daily.     • doxycycline (VIBRAMYCIN)  "100 MG capsule Take 1 capsule by mouth 2 (Two) Times a Day. 20 capsule 0   • dronedarone (MULTAQ) 400 MG tablet Take 400 mg by mouth Every Night.     • fexofenadine (ALLEGRA) 180 MG tablet Take 1 tablet by mouth Daily.     • fluticasone (FLONASE) 50 MCG/ACT nasal spray 2 sprays into each nostril As Needed for Rhinitis.     • glimepiride (AMARYL) 4 MG tablet Take 4 mg by mouth.     • losartan (COZAAR) 25 MG tablet Take 25 mg by mouth Daily.     • magnesium oxide (MAG-OX) 400 MG tablet      • metFORMIN (GLUCOPHAGE) 1000 MG tablet Take 1 tablet by mouth 2 (Two) Times a Day With Meals.     • metoprolol succinate XL (TOPROL-XL) 25 MG 24 hr tablet Take 25 mg by mouth Every Night. 0.5 tablet     • Multiple Vitamins-Minerals (ZINC PO) Take  by mouth.     • multivitamin (THERAGRAN) tablet tablet Take  by mouth Daily.     • ONE TOUCH ULTRA TEST test strip USE TO TEST BLOOD SUGAR THREE TIMES A DAY  1   • HYDROcodone-acetaminophen (NORCO) 7.5-325 MG per tablet Take 1 tablet by mouth Every 6 (Six) Hours As Needed for Moderate Pain. 12 tablet 0   • vitamin C (ASCORBIC ACID) 500 MG tablet Take 500 mg by mouth Every Night.       No current facility-administered medications for this visit.       Review of Systems   Constitutional: Negative.    HENT: Negative.    Eyes: Negative.    Respiratory: Negative.    Cardiovascular: Negative.    Gastrointestinal: Negative.    Endocrine: Negative.    Genitourinary: Negative.    Musculoskeletal:        Foot pain    Skin: Negative.    Allergic/Immunologic: Negative.    Neurological: Negative.    Hematological: Negative.    Psychiatric/Behavioral: Negative.          OBJECTIVE    Pulse 94   Ht 193 cm (76\")   Wt (!) 164 kg (362 lb)   SpO2 96%   BMI 44.06 kg/m²     Physical Exam  Vitals reviewed.   Constitutional:       Appearance: Normal appearance. He is well-developed.   HENT:      Head: Normocephalic and atraumatic.   Neck:      Trachea: Trachea and phonation normal.   Cardiovascular:      " Pulses:           Dorsalis pedis pulses are detected w/ Doppler on the left side.        Posterior tibial pulses are detected w/ Doppler on the left side.   Pulmonary:      Effort: Pulmonary effort is normal. No respiratory distress.   Abdominal:      General: There is no distension.      Palpations: Abdomen is soft.   Musculoskeletal:        Feet:    Feet:      Left foot:      Skin integrity: Ulcer present.   Skin:     General: Skin is warm and dry.   Neurological:      Mental Status: He is alert and oriented to person, place, and time.      GCS: GCS eye subscore is 4. GCS verbal subscore is 5. GCS motor subscore is 6.   Psychiatric:         Speech: Speech normal.         Behavior: Behavior normal. Behavior is cooperative.         Thought Content: Thought content normal.         Judgment: Judgment normal.          XR Ankle 3+ View Left    Result Date: 1/5/2023  Narrative: Three view left ankle HISTORY: Left ankle pain. Charcot joint. Open wound left lower leg. AP, lateral and oblique views obtained. COMPARISON: December 15, 2022 FINDINGS: Cast now in place. There are now dystrophic calcifications or ossifications ventral to the distal leg. Appearance of the ankle and midfoot has not changed. Again there is fragmentation and collapse of the talus. Degenerative changes midfoot with osteophytes and joint space narrowing. Calcaneal spurs. Plantar ossifications. No other osseous or articular abnormality. Atherosclerotic calcifications.     Impression: CONCLUSION: As above [id number] Electronically signed by:  Matias Waller MD  1/5/2023 4:25 PM CST Workstation: [id number]    Doppler Ankle Brachial Index Single Level CAR    Result Date: 1/20/2023  Narrative: •  Right Conclusion: The right PRITI is normal. •  Left Conclusion: The left PRITI is normal.     .  Reviewed PRITI    Procedures       ASSESSMENT AND PLAN     Diagnoses and all orders for this visit:     1. Charcot's joint, left ankle and foot (Primary)     2. S/P transmetatarsal  amputation of foot, right (HCC)     3. Type 2 diabetes mellitus with peripheral neuropathy (HCC)     4. Lower extremity edema     5. Wound of left lower extremity, initial encounter    Wounds remain about the same since the last evaluation.  No worsening noted.  Patient was painted with Betadine, and placed back into the TCC cast after the areas were covered with dressings.  He verbalized understanding plan of care and will follow-up in 1 week for repeat cast removal and recheck of wounds.  He was instructed to contact office for any worsening symptoms          This document has been electronically signed by Lito WATSON, FNP-C, ONP-C on January 25, 2023 10:19 CST

## 2023-01-27 ENCOUNTER — LAB (OUTPATIENT)
Dept: LAB | Facility: HOSPITAL | Age: 63
End: 2023-01-27
Payer: MEDICARE

## 2023-01-27 ENCOUNTER — OFFICE VISIT (OUTPATIENT)
Dept: PODIATRY | Facility: CLINIC | Age: 63
End: 2023-01-27
Payer: MEDICARE

## 2023-01-27 VITALS — HEIGHT: 76 IN | BODY MASS INDEX: 38.36 KG/M2 | OXYGEN SATURATION: 98 % | HEART RATE: 89 BPM | WEIGHT: 315 LBS

## 2023-01-27 DIAGNOSIS — E11.621 TYPE 2 DIABETES MELLITUS WITH FOOT ULCER, UNSPECIFIED WHETHER LONG TERM INSULIN USE: ICD-10-CM

## 2023-01-27 DIAGNOSIS — M14.672 CHARCOT'S JOINT OF LEFT FOOT: ICD-10-CM

## 2023-01-27 DIAGNOSIS — L97.509 TYPE 2 DIABETES MELLITUS WITH FOOT ULCER, UNSPECIFIED WHETHER LONG TERM INSULIN USE: ICD-10-CM

## 2023-01-27 DIAGNOSIS — R60.0 LOWER EXTREMITY EDEMA: ICD-10-CM

## 2023-01-27 DIAGNOSIS — L97.521 SKIN ULCER OF LEFT FOOT, LIMITED TO BREAKDOWN OF SKIN: Primary | ICD-10-CM

## 2023-01-27 PROCEDURE — 87077 CULTURE AEROBIC IDENTIFY: CPT | Performed by: NURSE PRACTITIONER

## 2023-01-27 PROCEDURE — 29445 APPL RIGID TOT CNTC LEG CAST: CPT | Performed by: NURSE PRACTITIONER

## 2023-01-27 PROCEDURE — 87205 SMEAR GRAM STAIN: CPT | Performed by: NURSE PRACTITIONER

## 2023-01-27 PROCEDURE — 99212 OFFICE O/P EST SF 10 MIN: CPT | Performed by: NURSE PRACTITIONER

## 2023-01-27 PROCEDURE — 87070 CULTURE OTHR SPECIMN AEROBIC: CPT | Performed by: NURSE PRACTITIONER

## 2023-01-27 PROCEDURE — 87186 SC STD MICRODIL/AGAR DIL: CPT | Performed by: NURSE PRACTITIONER

## 2023-01-27 RX ORDER — CLINDAMYCIN HYDROCHLORIDE 150 MG/1
150 CAPSULE ORAL 3 TIMES DAILY
Qty: 30 CAPSULE | Refills: 0 | Status: SHIPPED | OUTPATIENT
Start: 2023-01-27 | End: 2023-02-01 | Stop reason: SDUPTHER

## 2023-01-27 RX ORDER — BACILLUS COAGULANS/INULIN 1B-250 MG
1 CAPSULE ORAL 2 TIMES DAILY
Qty: 30 CAPSULE | Refills: 1 | Status: SHIPPED | OUTPATIENT
Start: 2023-01-27

## 2023-01-27 NOTE — PROGRESS NOTES
Emre Lisa  1960  62 y.o. male   PCP:Rasheed Bravo 7/19/2022   BS: 115 per patient       1/25/2023    Chief Complaint   Patient presents with   • Left Foot - Follow-up, Wound Check       History of Present Illness    Emre Lisa is a 62 y.o.male came to clinic for wound recheck on left foot. Patient was place in a T.C.C. Patient seem to be doing well in cast.               Past Medical History:   Diagnosis Date   • Arthritis    • Atrial fibrillation (HCC)    • Diabetes mellitus (HCC)    • Diabetic foot ulcer associated with type 2 diabetes mellitus (HCC)     left great toe   • Hallux malleus of left foot    • Hammer toe    • Hypertension    • MI (myocardial infarction) (HCC)    • Neuropathy in diabetes (HCC)    • Onychomycosis    • Presence of biventricular cardiac pacemaker    • Rheumatoid arthritis (HCC)          Past Surgical History:   Procedure Laterality Date   • AMPUTATION DIGIT Right 3/5/2017    Procedure: RIGHT AMPUTATION TRANSMETATRASAL , RECESSION GASTROCNEMOUS;  Surgeon: Marco A Thompson DPM;  Location: [facility];  Service:    • CARDIAC DEFIBRILLATOR PLACEMENT  2010, 2016   • CARPAL TUNNEL RELEASE  2015    elbow and wrist left arm   • FOOT IRRIGATION, DEBRIDEMENT AND REPAIR Left 3/7/2018    Procedure: FOOT IRRIGATION, DEBRIDEMENT AND REPAIR;  Surgeon: Marco A Thompson DPM;  Location: [facility];  Service:    • FOOT IRRIGATION, DEBRIDEMENT AND REPAIR Left 3/10/2018    Procedure: FOOT IRRIGATION, DEBRIDEMENT AND REPAIR;  Surgeon: Marco A Thompson DPM;  Location: [facility];  Service: Podiatry   • FOOT WOUND CLOSURE Right 3/2/2017    Procedure: INCISION AND DRAINAGE, RIGHT FOOT;  Surgeon: Tung Rosenthal DPM;  Location: [facility];  Service:    • HAMMER TOE REPAIR Left 2/15/2018    Procedure: HAMMERTOE CORRECTION LEFT SECOND, THIRD AND FOURTH TOES AND HALLUX INTERPHALANGEAL JOINT ARTHRODESIS LEFT FOOT (Micro Asnis, 4.0 & 5.0 Asnis)      (c-arm);  Surgeon: Marco A Thompson DPM;  Location:   [facility] OR;  Service:    • HARDWARE REMOVAL Left 3/5/2018    Procedure: ANKLE/FOOT HARDWARE REMOVAL;  Surgeon: Marco A Thompson DPM;  Location: [facility] OR;  Service:    • INCISION AND DRAINAGE LEG Left 3/5/2018    Procedure: INCISION AND DRAINAGE LOWER EXTREMITY;  Surgeon: Marco A Thompson DPM;  Location:  [facility] OR;  Service:    • PLANTAR FASCIA RELEASE Left 11/21/2019    Procedure: PLANTAR PLANING LEFT FOOT AND ALL OTHER INDICATED PROCEDURES;  Surgeon: Marco A Thompson DPM;  Location: [facility] OR;  Service: Podiatry   • TOE AMPUTATION Right 2016   • TONSILECTOMY, ADENOIDECTOMY, BILATERAL MYRINGOTOMY AND TUBES      age 25         Family History   Problem Relation Age of Onset   • Diabetes Father    • Stroke Father    • No Known Problems Maternal Grandmother    • No Known Problems Maternal Grandfather    • No Known Problems Paternal Grandmother    • No Known Problems Paternal Grandfather    • No Known Problems Daughter    • No Known Problems Daughter    • No Known Problems Son    • Heart disease Other    • Hypertension Other        Allergies   Allergen Reactions   • Penicillins Anaphylaxis   • Januvia [Sitagliptin] Hives   • Lipitor [Atorvastatin] Other (See Comments)     Muscle Cramps...   • Shellfish Allergy Other (See Comments)   • Peanut Oil Rash   • Peanut-Containing Drug Products Rash   • Sulfa Antibiotics Rash       Social History     Socioeconomic History   • Marital status:    Tobacco Use   • Smoking status: Never   • Smokeless tobacco: Never   Vaping Use   • Vaping Use: Never used   Substance and Sexual Activity   • Alcohol use: Yes     Comment: social   • Drug use: No   • Sexual activity: Defer         Current Outpatient Medications   Medication Sig Dispense Refill   • ascorbic acid (VITAMIN C) 1000 MG tablet Take 1,000 mg by mouth Daily.     • Bacillus Coagulans-Inulin (Probiotic) 1-250 BILLION-MG capsule Take 1 capsule by mouth 2 (Two) Times a Day. 30 capsule 1   • Blood Glucose Monitoring Suppl (ONE TOUCH  "ULTRA 2) w/Device kit      • Cholecalciferol 125 MCG (5000 UT) tablet Take 5,000 Units by mouth Daily.     • clindamycin (CLEOCIN) 150 MG capsule Take 1 capsule by mouth 3 (Three) Times a Day. 30 capsule 0   • dronedarone (MULTAQ) 400 MG tablet Take 400 mg by mouth Every Night.     • fexofenadine (ALLEGRA) 180 MG tablet Take 1 tablet by mouth Daily.     • fluticasone (FLONASE) 50 MCG/ACT nasal spray 2 sprays into each nostril As Needed for Rhinitis.     • glimepiride (AMARYL) 4 MG tablet Take 4 mg by mouth.     • losartan (COZAAR) 25 MG tablet Take 25 mg by mouth Daily.     • magnesium oxide (MAG-OX) 400 MG tablet      • metFORMIN (GLUCOPHAGE) 1000 MG tablet Take 1 tablet by mouth 2 (Two) Times a Day With Meals.     • metoprolol succinate XL (TOPROL-XL) 25 MG 24 hr tablet Take 25 mg by mouth Every Night. 0.5 tablet     • Multiple Vitamins-Minerals (ZINC PO) Take  by mouth.     • multivitamin (THERAGRAN) tablet tablet Take  by mouth Daily.     • ONE TOUCH ULTRA TEST test strip USE TO TEST BLOOD SUGAR THREE TIMES A DAY  1   • HYDROcodone-acetaminophen (NORCO) 7.5-325 MG per tablet Take 1 tablet by mouth Every 6 (Six) Hours As Needed for Moderate Pain. 12 tablet 0   • vitamin C (ASCORBIC ACID) 500 MG tablet Take 500 mg by mouth Every Night.       No current facility-administered medications for this visit.       Review of Systems   Constitutional: Negative.    HENT: Negative.    Eyes: Negative.    Respiratory: Negative.    Cardiovascular: Negative.    Gastrointestinal: Negative.    Endocrine: Negative.    Genitourinary: Negative.    Musculoskeletal:        Foot pain    Skin: Negative.    Allergic/Immunologic: Negative.    Neurological: Negative.    Hematological: Negative.    Psychiatric/Behavioral: Negative.          OBJECTIVE    Pulse 89   Ht 193 cm (76\")   Wt (!) 164 kg (362 lb)   SpO2 98%   BMI 44.06 kg/m²     Physical Exam  Vitals reviewed.   Constitutional:       Appearance: Normal appearance. He is " well-developed.   HENT:      Head: Normocephalic and atraumatic.   Neck:      Trachea: Trachea and phonation normal.   Cardiovascular:      Pulses:           Dorsalis pedis pulses are detected w/ Doppler on the left side.        Posterior tibial pulses are detected w/ Doppler on the left side.   Pulmonary:      Effort: Pulmonary effort is normal. No respiratory distress.   Abdominal:      General: There is no distension.      Palpations: Abdomen is soft.   Musculoskeletal:        Feet:    Feet:      Left foot:      Skin integrity: Ulcer present.   Skin:     General: Skin is warm and dry.   Neurological:      Mental Status: He is alert and oriented to person, place, and time.      GCS: GCS eye subscore is 4. GCS verbal subscore is 5. GCS motor subscore is 6.   Psychiatric:         Speech: Speech normal.         Behavior: Behavior normal. Behavior is cooperative.         Thought Content: Thought content normal.         Judgment: Judgment normal.          XR Ankle 3+ View Left    Result Date: 1/5/2023  Narrative: Three view left ankle HISTORY: Left ankle pain. Charcot joint. Open wound left lower leg. AP, lateral and oblique views obtained. COMPARISON: December 15, 2022 FINDINGS: Cast now in place. There are now dystrophic calcifications or ossifications ventral to the distal leg. Appearance of the ankle and midfoot has not changed. Again there is fragmentation and collapse of the talus. Degenerative changes midfoot with osteophytes and joint space narrowing. Calcaneal spurs. Plantar ossifications. No other osseous or articular abnormality. Atherosclerotic calcifications.     Impression: CONCLUSION: As above [id number] Electronically signed by:  Matias Waller MD  1/5/2023 4:25 PM CST Workstation: [id number]    Doppler Ankle Brachial Index Single Level CAR    Result Date: 1/20/2023  Narrative: •  Right Conclusion: The right PRITI is normal. •  Left Conclusion: The left PRITI is normal.     .  Reviewed  PRITI    Procedures       ASSESSMENT AND PLAN     Diagnoses and all orders for this visit:     1. Charcot's joint, left ankle and foot (Primary)     2. S/P transmetatarsal amputation of foot, right (HCC)     3. Type 2 diabetes mellitus with peripheral neuropathy (HCC)     4. Lower extremity edema     5. Wound of left lower extremity, initial encounter    Clindamycin 150 mg 3 times daily was sent in today for suspected infection of the wound along with probiotics.  Patient was instructed to take these medications as directed along with food.  We reapplied the TCC cast after the areas were padded and the wounds were painted with Betadine heavily.  Recommend follow-up on Wednesday of next week for recheck and most likely reapplication of TCC.  In the meantime he was instructed to contact the office for any worsening symptoms.        This document has been electronically signed by Lito WATSON, FNP-C, ONP-C on January 27, 2023 11:18 CST

## 2023-01-30 LAB
BACTERIA SPEC AEROBE CULT: ABNORMAL
GRAM STN SPEC: ABNORMAL
GRAM STN SPEC: ABNORMAL

## 2023-02-01 ENCOUNTER — OFFICE VISIT (OUTPATIENT)
Dept: PODIATRY | Facility: CLINIC | Age: 63
End: 2023-02-01
Payer: MEDICARE

## 2023-02-01 ENCOUNTER — LAB (OUTPATIENT)
Dept: LAB | Facility: HOSPITAL | Age: 63
End: 2023-02-01
Payer: MEDICARE

## 2023-02-01 VITALS
OXYGEN SATURATION: 99 % | HEIGHT: 76 IN | BODY MASS INDEX: 38.36 KG/M2 | HEART RATE: 96 BPM | DIASTOLIC BLOOD PRESSURE: 79 MMHG | SYSTOLIC BLOOD PRESSURE: 154 MMHG | WEIGHT: 315 LBS

## 2023-02-01 DIAGNOSIS — L03.116 CELLULITIS OF LEFT FOOT: ICD-10-CM

## 2023-02-01 DIAGNOSIS — L97.509 TYPE 2 DIABETES MELLITUS WITH FOOT ULCER, UNSPECIFIED WHETHER LONG TERM INSULIN USE: ICD-10-CM

## 2023-02-01 DIAGNOSIS — E11.621 TYPE 2 DIABETES MELLITUS WITH FOOT ULCER, UNSPECIFIED WHETHER LONG TERM INSULIN USE: ICD-10-CM

## 2023-02-01 DIAGNOSIS — M14.672 CHARCOT'S JOINT OF LEFT FOOT: Primary | ICD-10-CM

## 2023-02-01 DIAGNOSIS — L97.521 SKIN ULCER OF LEFT FOOT, LIMITED TO BREAKDOWN OF SKIN: ICD-10-CM

## 2023-02-01 DIAGNOSIS — M14.672 CHARCOT'S JOINT OF LEFT FOOT: ICD-10-CM

## 2023-02-01 PROCEDURE — 99212 OFFICE O/P EST SF 10 MIN: CPT | Performed by: NURSE PRACTITIONER

## 2023-02-01 PROCEDURE — 36415 COLL VENOUS BLD VENIPUNCTURE: CPT

## 2023-02-01 PROCEDURE — 29445 APPL RIGID TOT CNTC LEG CAST: CPT | Performed by: NURSE PRACTITIONER

## 2023-02-01 RX ORDER — CLINDAMYCIN HYDROCHLORIDE 150 MG/1
150 CAPSULE ORAL 3 TIMES DAILY
Qty: 30 CAPSULE | Refills: 0 | Status: SHIPPED | OUTPATIENT
Start: 2023-02-01 | End: 2023-03-21 | Stop reason: SDUPTHER

## 2023-02-01 NOTE — PROGRESS NOTES
Pharyngitis (Sore Throat), Report Pending       Pharyngitis (sore throat) is often due to a virus. It can also be caused by the “strep” streptococcus, or strep, bacteria, often called “strep throat” strep throat. Both viral and strep infections can cause throat pain that is worse when swallowing, aching all over with headache, and fever. Both types of infections are contagious. They may be spread by coughing, kissing, or touching others after touching your mouth or nose.  A test has been done to determine whether or not you (or your child, if your child is the patient) have strep throat. Call this facility as directed for the result. If the test is positive for strep infection, treament with antibiotic medications is needed. A prescription can be called in to your pharmacy at that time. If the test is negative, you probably have a viral pharyngitis. This does not require antibiotic treatment. Until you receive the results of the strep test, you should stay home from work. If your child is being tested, he or she should stay home from school.  Home care  · Rest at home. Drink plenty of fluids to avoid dehydration.  · If the test is positive for strep, no work or school for the first 2 days of taking the antibiotics. After this time, you will not be contagious. You can then return to work or school if you are feeling better.   · The antibiotic medication must be taken for the full 10 days, even if you feel better. This is very important to ensure the infection is treated. It is also important to prevent drug-resistent organisms from developing. If you were given an antibiotic shot, no more antibiotics are needed.  · For children: Use acetaminophen for fever, fussiness or discomfort. In infants over six months of age, you may use ibuprofen instead of acetaminophen. (NOTE: If your child has chronic liver or kidney disease or ever had a stomach ulcer or GI bleeding, talk with your doctor before using these medicines.)  Emre Lisa  1960  62 y.o. male   PCP:Rasheed Bravo 7/19/2022   BS: 105 per patient     2/1/2023    Chief Complaint   Patient presents with   • Left Foot - Follow-up       History of Present Illness    Emre Lisa is a 62 y.o.male came to clinic for wound recheck on left foot. Patient was place in a T.C.C. Patient seem to be doing well in cast.               Past Medical History:   Diagnosis Date   • Arthritis    • Atrial fibrillation (HCC)    • Diabetes mellitus (HCC)    • Diabetic foot ulcer associated with type 2 diabetes mellitus (HCC)     left great toe   • Hallux malleus of left foot    • Hammer toe    • Hypertension    • MI (myocardial infarction) (HCC)    • Neuropathy in diabetes (HCC)    • Onychomycosis    • Presence of biventricular cardiac pacemaker    • Rheumatoid arthritis (HCC)          Past Surgical History:   Procedure Laterality Date   • AMPUTATION DIGIT Right 3/5/2017    Procedure: RIGHT AMPUTATION TRANSMETATRASAL , RECESSION GASTROCNEMOUS;  Surgeon: Marco A Thompson DPM;  Location: [facility];  Service:    • CARDIAC DEFIBRILLATOR PLACEMENT  2010, 2016   • CARPAL TUNNEL RELEASE  2015    elbow and wrist left arm   • FOOT IRRIGATION, DEBRIDEMENT AND REPAIR Left 3/7/2018    Procedure: FOOT IRRIGATION, DEBRIDEMENT AND REPAIR;  Surgeon: Marco A Thompson DPM;  Location: [facility];  Service:    • FOOT IRRIGATION, DEBRIDEMENT AND REPAIR Left 3/10/2018    Procedure: FOOT IRRIGATION, DEBRIDEMENT AND REPAIR;  Surgeon: Marco A Thompson DPM;  Location: [facility];  Service: Podiatry   • FOOT WOUND CLOSURE Right 3/2/2017    Procedure: INCISION AND DRAINAGE, RIGHT FOOT;  Surgeon: Tung Rosenthal DPM;  Location: [facility];  Service:    • HAMMER TOE REPAIR Left 2/15/2018    Procedure: HAMMERTOE CORRECTION LEFT SECOND, THIRD AND FOURTH TOES AND HALLUX INTERPHALANGEAL JOINT ARTHRODESIS LEFT FOOT (Micro Asnis, 4.0 & 5.0 Asnis)      (c-arm);  Surgeon: Marco A Thompson DPM;  Location: [facility];  Service:     • HARDWARE REMOVAL Left 3/5/2018    Procedure: ANKLE/FOOT HARDWARE REMOVAL;  Surgeon: Marco A Thompson DPM;  Location: [facility] OR;  Service:    • INCISION AND DRAINAGE LEG Left 3/5/2018    Procedure: INCISION AND DRAINAGE LOWER EXTREMITY;  Surgeon: Marco A Thompson DPM;  Location: [facility] OR;  Service:    • PLANTAR FASCIA RELEASE Left 11/21/2019    Procedure: PLANTAR PLANING LEFT FOOT AND ALL OTHER INDICATED PROCEDURES;  Surgeon: Marco A Thompson DPM;  Location: [facility];  Service: Podiatry   • TOE AMPUTATION Right 2016   • TONSILECTOMY, ADENOIDECTOMY, BILATERAL MYRINGOTOMY AND TUBES      age 25         Family History   Problem Relation Age of Onset   • Diabetes Father    • Stroke Father    • No Known Problems Maternal Grandmother    • No Known Problems Maternal Grandfather    • No Known Problems Paternal Grandmother    • No Known Problems Paternal Grandfather    • No Known Problems Daughter    • No Known Problems Daughter    • No Known Problems Son    • Heart disease Other    • Hypertension Other        Allergies   Allergen Reactions   • Penicillins Anaphylaxis   • Januvia [Sitagliptin] Hives   • Lipitor [Atorvastatin] Other (See Comments)     Muscle Cramps...   • Shellfish Allergy Other (See Comments)   • Peanut Oil Rash   • Peanut-Containing Drug Products Rash   • Sulfa Antibiotics Rash       Social History     Socioeconomic History   • Marital status:    Tobacco Use   • Smoking status: Never   • Smokeless tobacco: Never   Vaping Use   • Vaping Use: Never used   Substance and Sexual Activity   • Alcohol use: Yes     Comment: social   • Drug use: No   • Sexual activity: Defer         Current Outpatient Medications   Medication Sig Dispense Refill   • ascorbic acid (VITAMIN C) 1000 MG tablet Take 1,000 mg by mouth Daily.     • Bacillus Coagulans-Inulin (Probiotic) 1-250 BILLION-MG capsule Take 1 capsule by mouth 2 (Two) Times a Day. 30 capsule 1   • Blood Glucose Monitoring Suppl (ONE TOUCH ULTRA 2) w/Device  (NOTE: Aspirin should never be used in anyone under 18 years of age who is ill with a fever. It may cause severe liver damage.)   · For adults: You may use acetaminophen or ibuprofen to control pain or fever, unless another medicine was prescribed for this. (NOTE: If you have chronic liver or kidney disease or ever had a stomach ulcer or GI bleeding, talk with your doctor before using these medicines.)  · Throat lozenges or numbing throat sprays can help reduce pain. Gargling with warm salt water will also help reduce throat pain. For this, dissolve 1/2 teaspoon of salt in 1 glass of warm water. To help soothe a sore throat, children can sip on juice or a popsicle. Children 5 years and older can also suck on a lollipop or hard candy.  · Avoid salty or spicy foods, which can irritate the throat.  Follow-up care  Follow up with your healthcare provider or our staff if you are not improving over the next week.  When to seek medical advice  Call your healthcare provider right away if any of these occur:  · Fever as directed by your doctor.  For children, seek care if:  ¨ Your child is of any age and has repeated fevers above 104°F (40°C).  ¨ Your child is younger than 2 years of age and has a fever of 100.4°F (38°C) that continues for more than 1 day.  ¨ Your child is 2 years old or older and has a fever of 100.4°F (38°C) that continues for more than 3 days.  · New or worsening ear pain, sinus pain, or headache  · Painful lumps in the back of neck  · Stiff neck  · Lymph nodes are getting larger  · Inability to swallow liquids, excessive drooling, or inability to open mouth wide due to throat pain  · Signs of dehydration (very dark urine or no urine, sunken eyes, dizziness)  · Trouble breathing or noisy breathing  · Muffled voice  · New rash  · Child appears to be getting sicker  © 0077-2122 [employer]. [address] [place] [address], [place] [address]. All rights reserved. This information is not intended as a  "kit      • Cholecalciferol 125 MCG (5000 UT) tablet Take 5,000 Units by mouth Daily.     • clindamycin (CLEOCIN) 150 MG capsule Take 1 capsule by mouth 3 (Three) Times a Day. 30 capsule 0   • dronedarone (MULTAQ) 400 MG tablet Take 400 mg by mouth Every Night.     • fexofenadine (ALLEGRA) 180 MG tablet Take 1 tablet by mouth Daily.     • fluticasone (FLONASE) 50 MCG/ACT nasal spray 2 sprays into each nostril As Needed for Rhinitis.     • glimepiride (AMARYL) 4 MG tablet Take 4 mg by mouth.     • losartan (COZAAR) 25 MG tablet Take 25 mg by mouth Daily.     • magnesium oxide (MAG-OX) 400 MG tablet      • metFORMIN (GLUCOPHAGE) 1000 MG tablet Take 1 tablet by mouth 2 (Two) Times a Day With Meals.     • metoprolol succinate XL (TOPROL-XL) 25 MG 24 hr tablet Take 25 mg by mouth Every Night. 0.5 tablet     • Multiple Vitamins-Minerals (ZINC PO) Take  by mouth.     • multivitamin (THERAGRAN) tablet tablet Take  by mouth Daily.     • ONE TOUCH ULTRA TEST test strip USE TO TEST BLOOD SUGAR THREE TIMES A DAY  1   • HYDROcodone-acetaminophen (NORCO) 7.5-325 MG per tablet Take 1 tablet by mouth Every 6 (Six) Hours As Needed for Moderate Pain. 12 tablet 0   • vitamin C (ASCORBIC ACID) 500 MG tablet Take 500 mg by mouth Every Night.       No current facility-administered medications for this visit.       Review of Systems   Constitutional: Negative.    HENT: Negative.    Eyes: Negative.    Respiratory: Negative.    Cardiovascular: Negative.    Gastrointestinal: Negative.    Endocrine: Negative.    Genitourinary: Negative.    Musculoskeletal:        Foot pain    Skin: Negative.    Allergic/Immunologic: Negative.    Neurological: Negative.    Hematological: Negative.    Psychiatric/Behavioral: Negative.          OBJECTIVE    /79   Pulse 96   Ht 193 cm (76\")   Wt (!) 164 kg (362 lb)   SpO2 99%   BMI 44.06 kg/m²     Physical Exam  Vitals reviewed.   Constitutional:       Appearance: Normal appearance. He is " substitute for professional medical care. Always follow your healthcare professional's instructions.         well-developed.   HENT:      Head: Normocephalic and atraumatic.   Neck:      Trachea: Trachea and phonation normal.   Cardiovascular:      Pulses:           Dorsalis pedis pulses are detected w/ Doppler on the left side.        Posterior tibial pulses are detected w/ Doppler on the left side.   Pulmonary:      Effort: Pulmonary effort is normal. No respiratory distress.   Abdominal:      General: There is no distension.      Palpations: Abdomen is soft.   Musculoskeletal:        Feet:    Feet:      Left foot:      Skin integrity: Ulcer present.   Skin:     General: Skin is warm and dry.   Neurological:      Mental Status: He is alert and oriented to person, place, and time.      GCS: GCS eye subscore is 4. GCS verbal subscore is 5. GCS motor subscore is 6.   Psychiatric:         Speech: Speech normal.         Behavior: Behavior normal. Behavior is cooperative.         Thought Content: Thought content normal.         Judgment: Judgment normal.          XR Ankle 3+ View Left    Result Date: 1/5/2023  Narrative: Three view left ankle HISTORY: Left ankle pain. Charcot joint. Open wound left lower leg. AP, lateral and oblique views obtained. COMPARISON: December 15, 2022 FINDINGS: Cast now in place. There are now dystrophic calcifications or ossifications ventral to the distal leg. Appearance of the ankle and midfoot has not changed. Again there is fragmentation and collapse of the talus. Degenerative changes midfoot with osteophytes and joint space narrowing. Calcaneal spurs. Plantar ossifications. No other osseous or articular abnormality. Atherosclerotic calcifications.     Impression: CONCLUSION: As above [id number] Electronically signed by:  Matias Waller MD  1/5/2023 4:25 PM CST Workstation: [id number]    Doppler Ankle Brachial Index Single Level CAR    Result Date: 1/20/2023  Narrative: •  Right Conclusion: The right PRITI is normal. •  Left Conclusion: The left PRITI is normal.     .  Reviewed  PRITI    Procedures       ASSESSMENT AND PLAN     Diagnoses and all orders for this visit:     1. Charcot's joint, left ankle and foot (Primary)     2. S/P transmetatarsal amputation of foot, right (HCC)     3. Type 2 diabetes mellitus with peripheral neuropathy (HCC)     4. Lower extremity edema     5. Wound of left lower extremity, initial encounter    Overall improved.  We will continue clindamycin twice daily, TCC cast was reapplied and the patient will follow-up in 1 week for wound recheck.  He verbalized understanding of plan of care and spoke with Dr. Thompson about potential operative solutions in the future.        This document has been electronically signed by Lito WATSON, FNP-C, ONP-C on February 1, 2023 11:02 CST

## 2023-02-08 ENCOUNTER — OFFICE VISIT (OUTPATIENT)
Dept: PODIATRY | Facility: CLINIC | Age: 63
End: 2023-02-08
Payer: MEDICARE

## 2023-02-08 VITALS
DIASTOLIC BLOOD PRESSURE: 80 MMHG | BODY MASS INDEX: 38.36 KG/M2 | SYSTOLIC BLOOD PRESSURE: 120 MMHG | OXYGEN SATURATION: 98 % | HEIGHT: 76 IN | HEART RATE: 90 BPM | WEIGHT: 315 LBS

## 2023-02-08 DIAGNOSIS — L03.116 CELLULITIS OF LEFT FOOT: ICD-10-CM

## 2023-02-08 DIAGNOSIS — M14.672 CHARCOT'S JOINT OF LEFT FOOT: ICD-10-CM

## 2023-02-08 DIAGNOSIS — M86.372 CHRONIC MULTIFOCAL OSTEOMYELITIS OF LEFT FOOT: ICD-10-CM

## 2023-02-08 PROCEDURE — 99212 OFFICE O/P EST SF 10 MIN: CPT | Performed by: NURSE PRACTITIONER

## 2023-02-08 PROCEDURE — 29445 APPL RIGID TOT CNTC LEG CAST: CPT | Performed by: NURSE PRACTITIONER

## 2023-02-08 NOTE — PROGRESS NOTES
Emre Lisa  1960  62 y.o. male   PCP:Rasheed Bravo 7/19/2022   BS: 121 per patient     2/8/2023    Chief Complaint   Patient presents with   • Left Ankle - Follow-up, Wound Check       History of Present Illness    Emre Lisa is a 62 y.o.male came to clinic for wound recheck on left foot. Patient was place in a T.C.C. Patient seem to be doing well in cast.               Past Medical History:   Diagnosis Date   • Arthritis    • Atrial fibrillation (HCC)    • Diabetes mellitus (HCC)    • Diabetic foot ulcer associated with type 2 diabetes mellitus (HCC)     left great toe   • Hallux malleus of left foot    • Hammer toe    • Hypertension    • MI (myocardial infarction) (HCC)    • Neuropathy in diabetes (HCC)    • Onychomycosis    • Presence of biventricular cardiac pacemaker    • Rheumatoid arthritis (HCC)          Past Surgical History:   Procedure Laterality Date   • AMPUTATION DIGIT Right 3/5/2017    Procedure: RIGHT AMPUTATION TRANSMETATRASAL , RECESSION GASTROCNEMOUS;  Surgeon: Marco A Thompson DPM;  Location: [facility];  Service:    • CARDIAC DEFIBRILLATOR PLACEMENT  2010, 2016   • CARPAL TUNNEL RELEASE  2015    elbow and wrist left arm   • FOOT IRRIGATION, DEBRIDEMENT AND REPAIR Left 3/7/2018    Procedure: FOOT IRRIGATION, DEBRIDEMENT AND REPAIR;  Surgeon: Marco A Thompson DPM;  Location: [facility];  Service:    • FOOT IRRIGATION, DEBRIDEMENT AND REPAIR Left 3/10/2018    Procedure: FOOT IRRIGATION, DEBRIDEMENT AND REPAIR;  Surgeon: Marco A Thompson DPM;  Location: [facility] OR;  Service: Podiatry   • FOOT WOUND CLOSURE Right 3/2/2017    Procedure: INCISION AND DRAINAGE, RIGHT FOOT;  Surgeon: Tung Rosenthal DPM;  Location: [facility] OR;  Service:    • HAMMER TOE REPAIR Left 2/15/2018    Procedure: HAMMERTOE CORRECTION LEFT SECOND, THIRD AND FOURTH TOES AND HALLUX INTERPHALANGEAL JOINT ARTHRODESIS LEFT FOOT (Micro Asnis, 4.0 & 5.0 Asnis)      (c-arm);  Surgeon: Marco A Thompson DPM;  Location: [facility]  OR;  Service:    • HARDWARE REMOVAL Left 3/5/2018    Procedure: ANKLE/FOOT HARDWARE REMOVAL;  Surgeon: Marco A Thompson DPM;  Location: [facility] OR;  Service:    • INCISION AND DRAINAGE LEG Left 3/5/2018    Procedure: INCISION AND DRAINAGE LOWER EXTREMITY;  Surgeon: Marco A Thompson DPM;  Location: [facility] OR;  Service:    • PLANTAR FASCIA RELEASE Left 11/21/2019    Procedure: PLANTAR PLANING LEFT FOOT AND ALL OTHER INDICATED PROCEDURES;  Surgeon: Marco A Thompson DPM;  Location: [facility] OR;  Service: Podiatry   • TOE AMPUTATION Right 2016   • TONSILECTOMY, ADENOIDECTOMY, BILATERAL MYRINGOTOMY AND TUBES      age 25         Family History   Problem Relation Age of Onset   • Diabetes Father    • Stroke Father    • No Known Problems Maternal Grandmother    • No Known Problems Maternal Grandfather    • No Known Problems Paternal Grandmother    • No Known Problems Paternal Grandfather    • No Known Problems Daughter    • No Known Problems Daughter    • No Known Problems Son    • Heart disease Other    • Hypertension Other        Allergies   Allergen Reactions   • Penicillins Anaphylaxis   • Januvia [Sitagliptin] Hives   • Lipitor [Atorvastatin] Other (See Comments)     Muscle Cramps...   • Shellfish Allergy Other (See Comments)   • Peanut Oil Rash   • Peanut-Containing Drug Products Rash   • Sulfa Antibiotics Rash       Social History     Socioeconomic History   • Marital status:    Tobacco Use   • Smoking status: Never   • Smokeless tobacco: Never   Vaping Use   • Vaping Use: Never used   Substance and Sexual Activity   • Alcohol use: Yes     Comment: social   • Drug use: No   • Sexual activity: Defer         Current Outpatient Medications   Medication Sig Dispense Refill   • ascorbic acid (VITAMIN C) 1000 MG tablet Take 1,000 mg by mouth Daily.     • Bacillus Coagulans-Inulin (Probiotic) 1-250 BILLION-MG capsule Take 1 capsule by mouth 2 (Two) Times a Day. 30 capsule 1   • Blood Glucose Monitoring Suppl (ONE TOUCH  "ULTRA 2) w/Device kit      • Cholecalciferol 125 MCG (5000 UT) tablet Take 5,000 Units by mouth Daily.     • clindamycin (CLEOCIN) 150 MG capsule Take 1 capsule by mouth 3 (Three) Times a Day. 30 capsule 0   • dronedarone (MULTAQ) 400 MG tablet Take 400 mg by mouth Every Night.     • fexofenadine (ALLEGRA) 180 MG tablet Take 1 tablet by mouth Daily.     • fluticasone (FLONASE) 50 MCG/ACT nasal spray 2 sprays into each nostril As Needed for Rhinitis.     • glimepiride (AMARYL) 4 MG tablet Take 4 mg by mouth.     • losartan (COZAAR) 25 MG tablet Take 25 mg by mouth Daily.     • magnesium oxide (MAG-OX) 400 MG tablet      • metFORMIN (GLUCOPHAGE) 1000 MG tablet Take 1 tablet by mouth 2 (Two) Times a Day With Meals.     • metoprolol succinate XL (TOPROL-XL) 25 MG 24 hr tablet Take 25 mg by mouth Every Night. 0.5 tablet     • Multiple Vitamins-Minerals (ZINC PO) Take  by mouth.     • multivitamin (THERAGRAN) tablet tablet Take  by mouth Daily.     • ONE TOUCH ULTRA TEST test strip USE TO TEST BLOOD SUGAR THREE TIMES A DAY  1   • HYDROcodone-acetaminophen (NORCO) 7.5-325 MG per tablet Take 1 tablet by mouth Every 6 (Six) Hours As Needed for Moderate Pain. 12 tablet 0   • vitamin C (ASCORBIC ACID) 500 MG tablet Take 500 mg by mouth Every Night.       No current facility-administered medications for this visit.       Review of Systems   Constitutional: Negative.    HENT: Negative.    Eyes: Negative.    Respiratory: Negative.    Cardiovascular: Negative.    Gastrointestinal: Negative.    Endocrine: Negative.    Genitourinary: Negative.    Musculoskeletal:        Foot pain    Skin: Negative.    Allergic/Immunologic: Negative.    Neurological: Negative.    Hematological: Negative.    Psychiatric/Behavioral: Negative.          OBJECTIVE    /80   Pulse 90   Ht 193 cm (76\")   Wt (!) 164 kg (362 lb)   SpO2 98%   BMI 44.06 kg/m²     Physical Exam  Vitals reviewed.   Constitutional:       Appearance: Normal appearance. He " is well-developed.   HENT:      Head: Normocephalic and atraumatic.   Neck:      Trachea: Trachea and phonation normal.   Cardiovascular:      Pulses:           Dorsalis pedis pulses are detected w/ Doppler on the left side.        Posterior tibial pulses are detected w/ Doppler on the left side.   Pulmonary:      Effort: Pulmonary effort is normal. No respiratory distress.   Abdominal:      General: There is no distension.      Palpations: Abdomen is soft.   Musculoskeletal:        Feet:    Feet:      Left foot:      Skin integrity: Ulcer present.   Skin:     General: Skin is warm and dry.   Neurological:      Mental Status: He is alert and oriented to person, place, and time.      GCS: GCS eye subscore is 4. GCS verbal subscore is 5. GCS motor subscore is 6.   Psychiatric:         Speech: Speech normal.         Behavior: Behavior normal. Behavior is cooperative.         Thought Content: Thought content normal.         Judgment: Judgment normal.          Doppler Ankle Brachial Index Single Level CAR    Result Date: 1/20/2023  Narrative: •  Right Conclusion: The right PRITI is normal. •  Left Conclusion: The left PRITI is normal.     .  Reviewed PRITI    Procedures       ASSESSMENT AND PLAN     Diagnoses and all orders for this visit:     1. Charcot's joint, left ankle and foot (Primary)     2. S/P transmetatarsal amputation of foot, right (HCC)     3. Type 2 diabetes mellitus with peripheral neuropathy (HCC)     4. Lower extremity edema     5. Wound of left lower extremity, initial encounter    Wounds on the lateral malleolus continue to improve as well as the one on the heel.  The patient does have an extensive area of moisture and near skin emaciation of the lateral plantar surface of the foot.  After long discussion we have opted recheck on Monday instead of 1 week and possibly initiate a biweekly TCC cast change because of the excessive moisture.  The patient verbalized understanding plan of care.  We will continue  his oral antibiotics until they are complete and will contact the office in the meantime for any worsening symptoms.          This document has been electronically signed by CUBA Rodriguez, ONP-C on February 8, 2023 11:32 CST

## 2023-02-13 ENCOUNTER — OFFICE VISIT (OUTPATIENT)
Dept: PODIATRY | Facility: CLINIC | Age: 63
End: 2023-02-13
Payer: MEDICARE

## 2023-02-13 VITALS — HEART RATE: 87 BPM | BODY MASS INDEX: 38.36 KG/M2 | WEIGHT: 315 LBS | HEIGHT: 76 IN | OXYGEN SATURATION: 99 %

## 2023-02-13 DIAGNOSIS — E11.621 TYPE 2 DIABETES MELLITUS WITH FOOT ULCER, UNSPECIFIED WHETHER LONG TERM INSULIN USE: Primary | ICD-10-CM

## 2023-02-13 DIAGNOSIS — L97.521 SKIN ULCER OF LEFT FOOT, LIMITED TO BREAKDOWN OF SKIN: ICD-10-CM

## 2023-02-13 DIAGNOSIS — L03.116 CELLULITIS OF LEFT FOOT: ICD-10-CM

## 2023-02-13 DIAGNOSIS — M14.672 CHARCOT'S JOINT OF LEFT FOOT: ICD-10-CM

## 2023-02-13 DIAGNOSIS — L97.509 TYPE 2 DIABETES MELLITUS WITH FOOT ULCER, UNSPECIFIED WHETHER LONG TERM INSULIN USE: Primary | ICD-10-CM

## 2023-02-13 PROCEDURE — 99212 OFFICE O/P EST SF 10 MIN: CPT | Performed by: NURSE PRACTITIONER

## 2023-02-13 PROCEDURE — 29445 APPL RIGID TOT CNTC LEG CAST: CPT | Performed by: NURSE PRACTITIONER

## 2023-02-13 NOTE — PROGRESS NOTES
Emre Lisa  1960  62 y.o. male   PCP:Rasheed Bravo 7/19/2022   BS: 121 per patient     2/13/2023    Chief Complaint   Patient presents with   • Left Ankle - Follow-up, Wound Check     TCC       History of Present Illness    Emre Lisa is a 62 y.o.male came to clinic for wound recheck on left foot. Patient was place in a T.C.C. Patient seem to be doing well in cast.               Past Medical History:   Diagnosis Date   • Arthritis    • Atrial fibrillation (HCC)    • Diabetes mellitus (HCC)    • Diabetic foot ulcer associated with type 2 diabetes mellitus (HCC)     left great toe   • Hallux malleus of left foot    • Hammer toe    • Hypertension    • MI (myocardial infarction) (HCC)    • Neuropathy in diabetes (HCC)    • Onychomycosis    • Presence of biventricular cardiac pacemaker    • Rheumatoid arthritis (HCC)          Past Surgical History:   Procedure Laterality Date   • AMPUTATION DIGIT Right 3/5/2017    Procedure: RIGHT AMPUTATION TRANSMETATRASAL , RECESSION GASTROCNEMOUS;  Surgeon: Marco A Thompson DPM;  Location: [facility];  Service:    • CARDIAC DEFIBRILLATOR PLACEMENT  2010, 2016   • CARPAL TUNNEL RELEASE  2015    elbow and wrist left arm   • FOOT IRRIGATION, DEBRIDEMENT AND REPAIR Left 3/7/2018    Procedure: FOOT IRRIGATION, DEBRIDEMENT AND REPAIR;  Surgeon: Marco A Thompson DPM;  Location: [facility];  Service:    • FOOT IRRIGATION, DEBRIDEMENT AND REPAIR Left 3/10/2018    Procedure: FOOT IRRIGATION, DEBRIDEMENT AND REPAIR;  Surgeon: Marco A Thompson DPM;  Location: [facility];  Service: Podiatry   • FOOT WOUND CLOSURE Right 3/2/2017    Procedure: INCISION AND DRAINAGE, RIGHT FOOT;  Surgeon: Tung Rosenthal DPM;  Location: [facility];  Service:    • HAMMER TOE REPAIR Left 2/15/2018    Procedure: HAMMERTOE CORRECTION LEFT SECOND, THIRD AND FOURTH TOES AND HALLUX INTERPHALANGEAL JOINT ARTHRODESIS LEFT FOOT (Micro Asnis, 4.0 & 5.0 Asnis)      (c-arm);  Surgeon: Marco A Thompson DPM;   Location: [facility] OR;  Service:    • HARDWARE REMOVAL Left 3/5/2018    Procedure: ANKLE/FOOT HARDWARE REMOVAL;  Surgeon: Marco A Thompson DPM;  Location: [facility] OR;  Service:    • INCISION AND DRAINAGE LEG Left 3/5/2018    Procedure: INCISION AND DRAINAGE LOWER EXTREMITY;  Surgeon: Marco A Thompson DPM;  Location: [facility] OR;  Service:    • PLANTAR FASCIA RELEASE Left 11/21/2019    Procedure: PLANTAR PLANING LEFT FOOT AND ALL OTHER INDICATED PROCEDURES;  Surgeon: Marco A Thompson DPM;  Location: [facility];  Service: Podiatry   • TOE AMPUTATION Right 2016   • TONSILECTOMY, ADENOIDECTOMY, BILATERAL MYRINGOTOMY AND TUBES      age 25         Family History   Problem Relation Age of Onset   • Diabetes Father    • Stroke Father    • No Known Problems Maternal Grandmother    • No Known Problems Maternal Grandfather    • No Known Problems Paternal Grandmother    • No Known Problems Paternal Grandfather    • No Known Problems Daughter    • No Known Problems Daughter    • No Known Problems Son    • Heart disease Other    • Hypertension Other        Allergies   Allergen Reactions   • Penicillins Anaphylaxis   • Januvia [Sitagliptin] Hives   • Lipitor [Atorvastatin] Other (See Comments)     Muscle Cramps...   • Shellfish Allergy Other (See Comments)   • Peanut Oil Rash   • Peanut-Containing Drug Products Rash   • Sulfa Antibiotics Rash       Social History     Socioeconomic History   • Marital status:    Tobacco Use   • Smoking status: Never   • Smokeless tobacco: Never   Vaping Use   • Vaping Use: Never used   Substance and Sexual Activity   • Alcohol use: Yes     Comment: social   • Drug use: No   • Sexual activity: Defer         Current Outpatient Medications   Medication Sig Dispense Refill   • ascorbic acid (VITAMIN C) 1000 MG tablet Take 1,000 mg by mouth Daily.     • Bacillus Coagulans-Inulin (Probiotic) 1-250 BILLION-MG capsule Take 1 capsule by mouth 2 (Two) Times a Day. 30 capsule 1   • Blood Glucose Monitoring  "Suppl (ONE TOUCH ULTRA 2) w/Device kit      • Cholecalciferol 125 MCG (5000 UT) tablet Take 5,000 Units by mouth Daily.     • clindamycin (CLEOCIN) 150 MG capsule Take 1 capsule by mouth 3 (Three) Times a Day. 30 capsule 0   • dronedarone (MULTAQ) 400 MG tablet Take 400 mg by mouth Every Night.     • fexofenadine (ALLEGRA) 180 MG tablet Take 1 tablet by mouth Daily.     • fluticasone (FLONASE) 50 MCG/ACT nasal spray 2 sprays into each nostril As Needed for Rhinitis.     • glimepiride (AMARYL) 4 MG tablet Take 4 mg by mouth.     • losartan (COZAAR) 25 MG tablet Take 25 mg by mouth Daily.     • magnesium oxide (MAG-OX) 400 MG tablet      • metFORMIN (GLUCOPHAGE) 1000 MG tablet Take 1 tablet by mouth 2 (Two) Times a Day With Meals.     • metoprolol succinate XL (TOPROL-XL) 25 MG 24 hr tablet Take 25 mg by mouth Every Night. 0.5 tablet     • Multiple Vitamins-Minerals (ZINC PO) Take  by mouth.     • multivitamin (THERAGRAN) tablet tablet Take  by mouth Daily.     • ONE TOUCH ULTRA TEST test strip USE TO TEST BLOOD SUGAR THREE TIMES A DAY  1   • HYDROcodone-acetaminophen (NORCO) 7.5-325 MG per tablet Take 1 tablet by mouth Every 6 (Six) Hours As Needed for Moderate Pain. 12 tablet 0   • vitamin C (ASCORBIC ACID) 500 MG tablet Take 500 mg by mouth Every Night.       No current facility-administered medications for this visit.       Review of Systems   Constitutional: Negative.    HENT: Negative.    Eyes: Negative.    Respiratory: Negative.    Cardiovascular: Negative.    Gastrointestinal: Negative.    Endocrine: Negative.    Genitourinary: Negative.    Musculoskeletal:        Foot pain    Skin: Negative.    Allergic/Immunologic: Negative.    Neurological: Negative.    Hematological: Negative.    Psychiatric/Behavioral: Negative.          OBJECTIVE    Pulse 87   Ht 193 cm (76\")   Wt (!) 164 kg (362 lb)   SpO2 99%   BMI 44.06 kg/m²     Physical Exam  Vitals reviewed.   Constitutional:       Appearance: Normal " appearance. He is well-developed.   HENT:      Head: Normocephalic and atraumatic.   Neck:      Trachea: Trachea and phonation normal.   Cardiovascular:      Pulses:           Dorsalis pedis pulses are detected w/ Doppler on the left side.        Posterior tibial pulses are detected w/ Doppler on the left side.   Pulmonary:      Effort: Pulmonary effort is normal. No respiratory distress.   Abdominal:      General: There is no distension.      Palpations: Abdomen is soft.   Musculoskeletal:        Feet:    Feet:      Right foot:      Toenail Condition: Right toenails are abnormally thick.      Left foot:      Skin integrity: Ulcer present.      Toenail Condition: Left toenails are abnormally thick.   Skin:     General: Skin is warm and dry.   Neurological:      Mental Status: He is alert and oriented to person, place, and time.      GCS: GCS eye subscore is 4. GCS verbal subscore is 5. GCS motor subscore is 6.   Psychiatric:         Speech: Speech normal.         Behavior: Behavior normal. Behavior is cooperative.         Thought Content: Thought content normal.         Judgment: Judgment normal.          No results found.  .    Procedures       ASSESSMENT AND PLAN     Diagnoses and all orders for this visit:     1. Charcot's joint, left ankle and foot (Primary)     2. S/P transmetatarsal amputation of foot, right (HCC)     3. Type 2 diabetes mellitus with peripheral neuropathy (HCC)     4. Lower extremity edema     5. Wound of left lower extremity, initial encounter    Heel wound has nearly resolved and I debrided the wound over the lateral malleolus.  The wound bed appears to look great when compared to previous films.  Will recommend continued reapplication of the TCC cast, I treated the lateral ankle wound with silver nitrate we will follow-up in 5 days for recheck and removal of the cast.  Patient verbalized understanding plan of care and will contact the office if needed for any worsening symptoms.   Circumference of the ankle is 35 cm and at the base of the toes it is30 cm          This document has been electronically signed by Lito WATSON, FNP-C, ONP-C on February 13, 2023 09:07 CST

## 2023-02-17 ENCOUNTER — OFFICE VISIT (OUTPATIENT)
Dept: PODIATRY | Facility: CLINIC | Age: 63
End: 2023-02-17
Payer: MEDICARE

## 2023-02-17 VITALS — OXYGEN SATURATION: 99 % | HEART RATE: 85 BPM | WEIGHT: 315 LBS | HEIGHT: 76 IN | BODY MASS INDEX: 38.36 KG/M2

## 2023-02-17 DIAGNOSIS — Z89.431 S/P TRANSMETATARSAL AMPUTATION OF FOOT, RIGHT: ICD-10-CM

## 2023-02-17 DIAGNOSIS — L97.509 TYPE 2 DIABETES MELLITUS WITH FOOT ULCER, UNSPECIFIED WHETHER LONG TERM INSULIN USE: Primary | ICD-10-CM

## 2023-02-17 DIAGNOSIS — E11.621 TYPE 2 DIABETES MELLITUS WITH FOOT ULCER, UNSPECIFIED WHETHER LONG TERM INSULIN USE: Primary | ICD-10-CM

## 2023-02-17 DIAGNOSIS — M14.672 CHARCOT'S JOINT OF LEFT FOOT: ICD-10-CM

## 2023-02-17 DIAGNOSIS — L03.116 CELLULITIS OF LEFT FOOT: ICD-10-CM

## 2023-02-17 DIAGNOSIS — L97.521 SKIN ULCER OF LEFT FOOT, LIMITED TO BREAKDOWN OF SKIN: ICD-10-CM

## 2023-02-17 PROCEDURE — 99212 OFFICE O/P EST SF 10 MIN: CPT | Performed by: NURSE PRACTITIONER

## 2023-02-17 PROCEDURE — 29445 APPL RIGID TOT CNTC LEG CAST: CPT | Performed by: NURSE PRACTITIONER

## 2023-02-17 NOTE — PROGRESS NOTES
Emre Lisa  1960  62 y.o. male   PCP:Rasheed Bravo 7/19/2022   BS: 115 per patient     2/17/2023    Chief Complaint   Patient presents with   • Left Ankle - Follow-up, Wound Check       History of Present Illness    Emre Lisa is a 62 y.o.male came to clinic for wound recheck on left foot. Patient was place in a T.C.C. Patient seem to be doing well in cast.               Past Medical History:   Diagnosis Date   • Arthritis    • Atrial fibrillation (HCC)    • Diabetes mellitus (HCC)    • Diabetic foot ulcer associated with type 2 diabetes mellitus (HCC)     left great toe   • Hallux malleus of left foot    • Hammer toe    • Hypertension    • MI (myocardial infarction) (HCC)    • Neuropathy in diabetes (HCC)    • Onychomycosis    • Presence of biventricular cardiac pacemaker    • Rheumatoid arthritis (HCC)          Past Surgical History:   Procedure Laterality Date   • AMPUTATION DIGIT Right 3/5/2017    Procedure: RIGHT AMPUTATION TRANSMETATRASAL , RECESSION GASTROCNEMOUS;  Surgeon: Marco A Thompson DPM;  Location: [facility];  Service:    • CARDIAC DEFIBRILLATOR PLACEMENT  2010, 2016   • CARPAL TUNNEL RELEASE  2015    elbow and wrist left arm   • FOOT IRRIGATION, DEBRIDEMENT AND REPAIR Left 3/7/2018    Procedure: FOOT IRRIGATION, DEBRIDEMENT AND REPAIR;  Surgeon: Marco A Thompson DPM;  Location: [facility];  Service:    • FOOT IRRIGATION, DEBRIDEMENT AND REPAIR Left 3/10/2018    Procedure: FOOT IRRIGATION, DEBRIDEMENT AND REPAIR;  Surgeon: Marco A Thompson DPM;  Location: [facility];  Service: Podiatry   • FOOT WOUND CLOSURE Right 3/2/2017    Procedure: INCISION AND DRAINAGE, RIGHT FOOT;  Surgeon: Tung Rosenthal DPM;  Location: [facility];  Service:    • HAMMER TOE REPAIR Left 2/15/2018    Procedure: HAMMERTOE CORRECTION LEFT SECOND, THIRD AND FOURTH TOES AND HALLUX INTERPHALANGEAL JOINT ARTHRODESIS LEFT FOOT (Micro Asnis, 4.0 & 5.0 Asnis)      (c-arm);  Surgeon: Marco A Thompson DPM;  Location:   [facility] OR;  Service:    • HARDWARE REMOVAL Left 3/5/2018    Procedure: ANKLE/FOOT HARDWARE REMOVAL;  Surgeon: Marco A Thompson DPM;  Location: [facility] OR;  Service:    • INCISION AND DRAINAGE LEG Left 3/5/2018    Procedure: INCISION AND DRAINAGE LOWER EXTREMITY;  Surgeon: Marco A Thompson DPM;  Location:  [facility] OR;  Service:    • PLANTAR FASCIA RELEASE Left 11/21/2019    Procedure: PLANTAR PLANING LEFT FOOT AND ALL OTHER INDICATED PROCEDURES;  Surgeon: Marco A Thompson DPM;  Location: [facility] OR;  Service: Podiatry   • TOE AMPUTATION Right 2016   • TONSILECTOMY, ADENOIDECTOMY, BILATERAL MYRINGOTOMY AND TUBES      age 25         Family History   Problem Relation Age of Onset   • Diabetes Father    • Stroke Father    • No Known Problems Maternal Grandmother    • No Known Problems Maternal Grandfather    • No Known Problems Paternal Grandmother    • No Known Problems Paternal Grandfather    • No Known Problems Daughter    • No Known Problems Daughter    • No Known Problems Son    • Heart disease Other    • Hypertension Other        Allergies   Allergen Reactions   • Penicillins Anaphylaxis   • Januvia [Sitagliptin] Hives   • Lipitor [Atorvastatin] Other (See Comments)     Muscle Cramps...   • Shellfish Allergy Other (See Comments)   • Peanut Oil Rash   • Peanut-Containing Drug Products Rash   • Sulfa Antibiotics Rash       Social History     Socioeconomic History   • Marital status:    Tobacco Use   • Smoking status: Never   • Smokeless tobacco: Never   Vaping Use   • Vaping Use: Never used   Substance and Sexual Activity   • Alcohol use: Yes     Comment: social   • Drug use: No   • Sexual activity: Defer         Current Outpatient Medications   Medication Sig Dispense Refill   • ascorbic acid (VITAMIN C) 1000 MG tablet Take 1,000 mg by mouth Daily.     • Bacillus Coagulans-Inulin (Probiotic) 1-250 BILLION-MG capsule Take 1 capsule by mouth 2 (Two) Times a Day. 30 capsule 1   • Blood Glucose Monitoring Suppl (ONE TOUCH  "ULTRA 2) w/Device kit      • Cholecalciferol 125 MCG (5000 UT) tablet Take 5,000 Units by mouth Daily.     • clindamycin (CLEOCIN) 150 MG capsule Take 1 capsule by mouth 3 (Three) Times a Day. 30 capsule 0   • dronedarone (MULTAQ) 400 MG tablet Take 400 mg by mouth Every Night.     • fexofenadine (ALLEGRA) 180 MG tablet Take 1 tablet by mouth Daily.     • fluticasone (FLONASE) 50 MCG/ACT nasal spray 2 sprays into each nostril As Needed for Rhinitis.     • glimepiride (AMARYL) 4 MG tablet Take 4 mg by mouth.     • losartan (COZAAR) 25 MG tablet Take 25 mg by mouth Daily.     • magnesium oxide (MAG-OX) 400 MG tablet      • metFORMIN (GLUCOPHAGE) 1000 MG tablet Take 1 tablet by mouth 2 (Two) Times a Day With Meals.     • metoprolol succinate XL (TOPROL-XL) 25 MG 24 hr tablet Take 25 mg by mouth Every Night. 0.5 tablet     • Multiple Vitamins-Minerals (ZINC PO) Take  by mouth.     • multivitamin (THERAGRAN) tablet tablet Take  by mouth Daily.     • ONE TOUCH ULTRA TEST test strip USE TO TEST BLOOD SUGAR THREE TIMES A DAY  1   • HYDROcodone-acetaminophen (NORCO) 7.5-325 MG per tablet Take 1 tablet by mouth Every 6 (Six) Hours As Needed for Moderate Pain. 12 tablet 0   • vitamin C (ASCORBIC ACID) 500 MG tablet Take 500 mg by mouth Every Night.       No current facility-administered medications for this visit.       Review of Systems   Constitutional: Negative.    HENT: Negative.    Eyes: Negative.    Respiratory: Negative.    Cardiovascular: Negative.    Gastrointestinal: Negative.    Endocrine: Negative.    Genitourinary: Negative.    Musculoskeletal:        Foot pain    Skin: Negative.    Allergic/Immunologic: Negative.    Neurological: Negative.    Hematological: Negative.    Psychiatric/Behavioral: Negative.          OBJECTIVE    Pulse 85   Ht 193 cm (76\")   Wt (!) 164 kg (362 lb)   SpO2 99%   BMI 44.06 kg/m²     Physical Exam  Vitals reviewed.   Constitutional:       Appearance: Normal appearance. He is " well-developed.   HENT:      Head: Normocephalic and atraumatic.   Neck:      Trachea: Trachea and phonation normal.   Cardiovascular:      Pulses:           Dorsalis pedis pulses are detected w/ Doppler on the left side.        Posterior tibial pulses are detected w/ Doppler on the left side.   Pulmonary:      Effort: Pulmonary effort is normal. No respiratory distress.   Abdominal:      General: There is no distension.      Palpations: Abdomen is soft.   Musculoskeletal:        Feet:    Feet:      Right foot:      Toenail Condition: Right toenails are abnormally thick.      Left foot:      Skin integrity: Ulcer present.      Toenail Condition: Left toenails are abnormally thick.   Skin:     General: Skin is warm and dry.   Neurological:      Mental Status: He is alert and oriented to person, place, and time.      GCS: GCS eye subscore is 4. GCS verbal subscore is 5. GCS motor subscore is 6.   Psychiatric:         Speech: Speech normal.         Behavior: Behavior normal. Behavior is cooperative.         Thought Content: Thought content normal.         Judgment: Judgment normal.          No results found.  .    Procedures       ASSESSMENT AND PLAN     Diagnoses and all orders for this visit:     1. Charcot's joint, left ankle and foot (Primary)     2. S/P transmetatarsal amputation of foot, right (HCC)     3. Type 2 diabetes mellitus with peripheral neuropathy (HCC)     4. Lower extremity edema     5. Wound of left lower extremity, initial encounter    Lateral malleoli are wound debrided to the wound bed and treated with silver nitrate then patient was placed back into the TCC cast after being cleaned with Betadine.  He tolerated the procedure well and left for follow-up next week for recheck unless symptoms worsen.  He verbalized understanding of the plan of care will contact office as needed          This document has been electronically signed by Lito WATSON, FNP-C, ONP-C on February 17, 2023 11:04 CST

## 2023-02-20 ENCOUNTER — OFFICE VISIT (OUTPATIENT)
Dept: PODIATRY | Facility: CLINIC | Age: 63
End: 2023-02-20
Payer: MEDICARE

## 2023-02-20 VITALS
HEART RATE: 83 BPM | BODY MASS INDEX: 38.36 KG/M2 | SYSTOLIC BLOOD PRESSURE: 139 MMHG | HEIGHT: 76 IN | DIASTOLIC BLOOD PRESSURE: 83 MMHG | OXYGEN SATURATION: 98 % | WEIGHT: 315 LBS

## 2023-02-20 DIAGNOSIS — E11.621 TYPE 2 DIABETES MELLITUS WITH FOOT ULCER, UNSPECIFIED WHETHER LONG TERM INSULIN USE: Primary | ICD-10-CM

## 2023-02-20 DIAGNOSIS — L97.509 TYPE 2 DIABETES MELLITUS WITH FOOT ULCER, UNSPECIFIED WHETHER LONG TERM INSULIN USE: Primary | ICD-10-CM

## 2023-02-20 DIAGNOSIS — L03.116 CELLULITIS OF LEFT FOOT: ICD-10-CM

## 2023-02-20 DIAGNOSIS — L97.521 SKIN ULCER OF LEFT FOOT, LIMITED TO BREAKDOWN OF SKIN: ICD-10-CM

## 2023-02-20 DIAGNOSIS — M14.672 CHARCOT'S JOINT OF LEFT FOOT: ICD-10-CM

## 2023-02-20 PROCEDURE — 29445 APPL RIGID TOT CNTC LEG CAST: CPT | Performed by: NURSE PRACTITIONER

## 2023-02-20 PROCEDURE — 99212 OFFICE O/P EST SF 10 MIN: CPT | Performed by: NURSE PRACTITIONER

## 2023-02-20 NOTE — PROGRESS NOTES
Emre Lisa  1960  62 y.o. male   PCP:Rasheed Bravo 7/19/2022   BS: 115 per patient     2/20/2023    Chief Complaint   Patient presents with   • Left Foot - Follow-up, Wound Check       History of Present Illness    Emre Lisa is a 62 y.o.male came to clinic for wound recheck on left foot. Patient was place in a T.C.C. Patient seem to be doing well in cast.               Past Medical History:   Diagnosis Date   • Arthritis    • Atrial fibrillation (HCC)    • Diabetes mellitus (HCC)    • Diabetic foot ulcer associated with type 2 diabetes mellitus (HCC)     left great toe   • Hallux malleus of left foot    • Hammer toe    • Hypertension    • MI (myocardial infarction) (HCC)    • Neuropathy in diabetes (HCC)    • Onychomycosis    • Presence of biventricular cardiac pacemaker    • Rheumatoid arthritis (HCC)          Past Surgical History:   Procedure Laterality Date   • AMPUTATION DIGIT Right 3/5/2017    Procedure: RIGHT AMPUTATION TRANSMETATRASAL , RECESSION GASTROCNEMOUS;  Surgeon: Marco A Thompson DPM;  Location: [facility];  Service:    • CARDIAC DEFIBRILLATOR PLACEMENT  2010, 2016   • CARPAL TUNNEL RELEASE  2015    elbow and wrist left arm   • FOOT IRRIGATION, DEBRIDEMENT AND REPAIR Left 3/7/2018    Procedure: FOOT IRRIGATION, DEBRIDEMENT AND REPAIR;  Surgeon: Marco A Thompson DPM;  Location: [facility];  Service:    • FOOT IRRIGATION, DEBRIDEMENT AND REPAIR Left 3/10/2018    Procedure: FOOT IRRIGATION, DEBRIDEMENT AND REPAIR;  Surgeon: Marco A Thompson DPM;  Location: [facility] OR;  Service: Podiatry   • FOOT WOUND CLOSURE Right 3/2/2017    Procedure: INCISION AND DRAINAGE, RIGHT FOOT;  Surgeon: Tung Rosenthal DPM;  Location: [facility] OR;  Service:    • HAMMER TOE REPAIR Left 2/15/2018    Procedure: HAMMERTOE CORRECTION LEFT SECOND, THIRD AND FOURTH TOES AND HALLUX INTERPHALANGEAL JOINT ARTHRODESIS LEFT FOOT (Micro Asnis, 4.0 & 5.0 Asnis)      (c-arm);  Surgeon: Marco A Thompson DPM;  Location: [facility]  OR;  Service:    • HARDWARE REMOVAL Left 3/5/2018    Procedure: ANKLE/FOOT HARDWARE REMOVAL;  Surgeon: Marco A Thompson DPM;  Location: [facility] OR;  Service:    • INCISION AND DRAINAGE LEG Left 3/5/2018    Procedure: INCISION AND DRAINAGE LOWER EXTREMITY;  Surgeon: Marco A Thompson DPM;  Location: [facility] OR;  Service:    • PLANTAR FASCIA RELEASE Left 11/21/2019    Procedure: PLANTAR PLANING LEFT FOOT AND ALL OTHER INDICATED PROCEDURES;  Surgeon: Marco A Thompson DPM;  Location: [facility] OR;  Service: Podiatry   • TOE AMPUTATION Right 2016   • TONSILECTOMY, ADENOIDECTOMY, BILATERAL MYRINGOTOMY AND TUBES      age 25         Family History   Problem Relation Age of Onset   • Diabetes Father    • Stroke Father    • No Known Problems Maternal Grandmother    • No Known Problems Maternal Grandfather    • No Known Problems Paternal Grandmother    • No Known Problems Paternal Grandfather    • No Known Problems Daughter    • No Known Problems Daughter    • No Known Problems Son    • Heart disease Other    • Hypertension Other        Allergies   Allergen Reactions   • Penicillins Anaphylaxis   • Januvia [Sitagliptin] Hives   • Lipitor [Atorvastatin] Other (See Comments)     Muscle Cramps...   • Shellfish Allergy Other (See Comments)   • Peanut Oil Rash   • Peanut-Containing Drug Products Rash   • Sulfa Antibiotics Rash       Social History     Socioeconomic History   • Marital status:    Tobacco Use   • Smoking status: Never   • Smokeless tobacco: Never   Vaping Use   • Vaping Use: Never used   Substance and Sexual Activity   • Alcohol use: Yes     Comment: social   • Drug use: No   • Sexual activity: Defer         Current Outpatient Medications   Medication Sig Dispense Refill   • ascorbic acid (VITAMIN C) 1000 MG tablet Take 1,000 mg by mouth Daily.     • Bacillus Coagulans-Inulin (Probiotic) 1-250 BILLION-MG capsule Take 1 capsule by mouth 2 (Two) Times a Day. 30 capsule 1   • Blood Glucose Monitoring Suppl (ONE TOUCH  "ULTRA 2) w/Device kit      • Cholecalciferol 125 MCG (5000 UT) tablet Take 5,000 Units by mouth Daily.     • clindamycin (CLEOCIN) 150 MG capsule Take 1 capsule by mouth 3 (Three) Times a Day. 30 capsule 0   • dronedarone (MULTAQ) 400 MG tablet Take 400 mg by mouth Every Night.     • fexofenadine (ALLEGRA) 180 MG tablet Take 1 tablet by mouth Daily.     • fluticasone (FLONASE) 50 MCG/ACT nasal spray 2 sprays into each nostril As Needed for Rhinitis.     • glimepiride (AMARYL) 4 MG tablet Take 4 mg by mouth.     • losartan (COZAAR) 25 MG tablet Take 25 mg by mouth Daily.     • magnesium oxide (MAG-OX) 400 MG tablet      • metFORMIN (GLUCOPHAGE) 1000 MG tablet Take 1 tablet by mouth 2 (Two) Times a Day With Meals.     • metoprolol succinate XL (TOPROL-XL) 25 MG 24 hr tablet Take 25 mg by mouth Every Night. 0.5 tablet     • Multiple Vitamins-Minerals (ZINC PO) Take  by mouth.     • multivitamin (THERAGRAN) tablet tablet Take  by mouth Daily.     • ONE TOUCH ULTRA TEST test strip USE TO TEST BLOOD SUGAR THREE TIMES A DAY  1   • HYDROcodone-acetaminophen (NORCO) 7.5-325 MG per tablet Take 1 tablet by mouth Every 6 (Six) Hours As Needed for Moderate Pain. 12 tablet 0   • vitamin C (ASCORBIC ACID) 500 MG tablet Take 500 mg by mouth Every Night.       No current facility-administered medications for this visit.       Review of Systems   Constitutional: Negative.    HENT: Negative.    Eyes: Negative.    Respiratory: Negative.    Cardiovascular: Negative.    Gastrointestinal: Negative.    Endocrine: Negative.    Genitourinary: Negative.    Musculoskeletal:        Foot pain    Skin: Negative.    Allergic/Immunologic: Negative.    Neurological: Negative.    Hematological: Negative.    Psychiatric/Behavioral: Negative.          OBJECTIVE    /83   Pulse 83   Ht 193 cm (76\")   Wt (!) 164 kg (362 lb)   SpO2 98%   BMI 44.06 kg/m²     Physical Exam  Vitals reviewed.   Constitutional:       Appearance: Normal appearance. He " is well-developed.   HENT:      Head: Normocephalic and atraumatic.   Neck:      Trachea: Trachea and phonation normal.   Cardiovascular:      Pulses:           Dorsalis pedis pulses are detected w/ Doppler on the left side.        Posterior tibial pulses are detected w/ Doppler on the left side.   Pulmonary:      Effort: Pulmonary effort is normal. No respiratory distress.   Abdominal:      General: There is no distension.      Palpations: Abdomen is soft.   Musculoskeletal:        Feet:    Feet:      Right foot:      Toenail Condition: Right toenails are abnormally thick.      Left foot:      Skin integrity: Ulcer present.      Toenail Condition: Left toenails are abnormally thick.   Skin:     General: Skin is warm and dry.   Neurological:      Mental Status: He is alert and oriented to person, place, and time.      GCS: GCS eye subscore is 4. GCS verbal subscore is 5. GCS motor subscore is 6.   Psychiatric:         Speech: Speech normal.         Behavior: Behavior normal. Behavior is cooperative.         Thought Content: Thought content normal.             Judgment: Judgment normal.          No results found.  .    Procedures       ASSESSMENT AND PLAN     Diagnoses and all orders for this visit:     1. Charcot's joint, left ankle and foot (Primary)     2. S/P transmetatarsal amputation of foot, right (HCC)     3. Type 2 diabetes mellitus with peripheral neuropathy (HCC)     4. Lower extremity edema     5. Wound of left lower extremity, initial encounter    Lateral malleoli are wound debrided to the wound bed and treated with silver nitrate then patient was placed back into the TCC cast after being cleaned with Betadine.  He tolerated the procedure well and left for follow-up next week for recheck unless symptoms worsen.  He verbalized understanding of the plan of care will contact office as needed          This document has been electronically signed by Lito WATSON, FNP-C, ONP-C on February 20, 2023  09:52 CST

## 2023-02-21 ENCOUNTER — OFFICE VISIT (OUTPATIENT)
Dept: PODIATRY | Facility: CLINIC | Age: 63
End: 2023-02-21
Payer: MEDICARE

## 2023-02-21 ENCOUNTER — TELEPHONE (OUTPATIENT)
Dept: PODIATRY | Facility: CLINIC | Age: 63
End: 2023-02-21

## 2023-02-21 VITALS
HEIGHT: 76 IN | SYSTOLIC BLOOD PRESSURE: 152 MMHG | BODY MASS INDEX: 38.36 KG/M2 | OXYGEN SATURATION: 99 % | HEART RATE: 83 BPM | DIASTOLIC BLOOD PRESSURE: 80 MMHG | WEIGHT: 315 LBS

## 2023-02-21 DIAGNOSIS — M14.672 CHARCOT'S JOINT OF LEFT FOOT: ICD-10-CM

## 2023-02-21 DIAGNOSIS — L03.116 CELLULITIS OF LEFT FOOT: ICD-10-CM

## 2023-02-21 DIAGNOSIS — M86.372 CHRONIC MULTIFOCAL OSTEOMYELITIS OF LEFT FOOT: ICD-10-CM

## 2023-02-21 PROCEDURE — 29445 APPL RIGID TOT CNTC LEG CAST: CPT | Performed by: NURSE PRACTITIONER

## 2023-02-21 NOTE — TELEPHONE ENCOUNTER
PT REQUESTS A CALL BACK RE CAST IS BOTHERING HIM.  PT SAYS IT DOESN'T FIT RIGHT. CALL BACK # [phone].  THANK YOU.

## 2023-02-21 NOTE — PROGRESS NOTES
Emre Lisa  1960  62 y.o. male   PCP:Rasheed Bravo 7/19/2022   BS: 114 per patient     2/21/2023    Chief Complaint   Patient presents with   • Left Foot - Follow-up       History of Present Illness    Emre Lisa is a 62 y.o.male patient came to clinic for a cast change.    Patient reports that the cast felt tight and is uncomfortable at this time          Past Medical History:   Diagnosis Date   • Arthritis    • Atrial fibrillation (HCC)    • Diabetes mellitus (HCC)    • Diabetic foot ulcer associated with type 2 diabetes mellitus (HCC)     left great toe   • Hallux malleus of left foot    • Hammer toe    • Hypertension    • MI (myocardial infarction) (HCC)    • Neuropathy in diabetes (HCC)    • Onychomycosis    • Presence of biventricular cardiac pacemaker    • Rheumatoid arthritis (HCC)          Past Surgical History:   Procedure Laterality Date   • AMPUTATION DIGIT Right 3/5/2017    Procedure: RIGHT AMPUTATION TRANSMETATRASAL , RECESSION GASTROCNEMOUS;  Surgeon: Marco A Thompson DPM;  Location: [facility];  Service:    • CARDIAC DEFIBRILLATOR PLACEMENT  2010, 2016   • CARPAL TUNNEL RELEASE  2015    elbow and wrist left arm   • FOOT IRRIGATION, DEBRIDEMENT AND REPAIR Left 3/7/2018    Procedure: FOOT IRRIGATION, DEBRIDEMENT AND REPAIR;  Surgeon: Marco A Thompson DPM;  Location: [facility];  Service:    • FOOT IRRIGATION, DEBRIDEMENT AND REPAIR Left 3/10/2018    Procedure: FOOT IRRIGATION, DEBRIDEMENT AND REPAIR;  Surgeon: Marco A Thompson DPM;  Location: [facility] OR;  Service: Podiatry   • FOOT WOUND CLOSURE Right 3/2/2017    Procedure: INCISION AND DRAINAGE, RIGHT FOOT;  Surgeon: Tung Rosenthal DPM;  Location: [facility];  Service:    • HAMMER TOE REPAIR Left 2/15/2018    Procedure: HAMMERTOE CORRECTION LEFT SECOND, THIRD AND FOURTH TOES AND HALLUX INTERPHALANGEAL JOINT ARTHRODESIS LEFT FOOT (Micro Asnis, 4.0 & 5.0 Asnis)      (c-arm);  Surgeon: Marco A Thomspon DPM;  Location: [facility];  Service:     • HARDWARE REMOVAL Left 3/5/2018    Procedure: ANKLE/FOOT HARDWARE REMOVAL;  Surgeon: Marco A Thompson DPM;  Location: [facility] OR;  Service:    • INCISION AND DRAINAGE LEG Left 3/5/2018    Procedure: INCISION AND DRAINAGE LOWER EXTREMITY;  Surgeon: Marco A Thompson DPM;  Location: [facility] OR;  Service:    • PLANTAR FASCIA RELEASE Left 11/21/2019    Procedure: PLANTAR PLANING LEFT FOOT AND ALL OTHER INDICATED PROCEDURES;  Surgeon: Marco A Thompson DPM;  Location: [facility];  Service: Podiatry   • TOE AMPUTATION Right 2016   • TONSILECTOMY, ADENOIDECTOMY, BILATERAL MYRINGOTOMY AND TUBES      age 25         Family History   Problem Relation Age of Onset   • Diabetes Father    • Stroke Father    • No Known Problems Maternal Grandmother    • No Known Problems Maternal Grandfather    • No Known Problems Paternal Grandmother    • No Known Problems Paternal Grandfather    • No Known Problems Daughter    • No Known Problems Daughter    • No Known Problems Son    • Heart disease Other    • Hypertension Other        Allergies   Allergen Reactions   • Penicillins Anaphylaxis   • Januvia [Sitagliptin] Hives   • Lipitor [Atorvastatin] Other (See Comments)     Muscle Cramps...   • Shellfish Allergy Other (See Comments)   • Peanut Oil Rash   • Peanut-Containing Drug Products Rash   • Sulfa Antibiotics Rash       Social History     Socioeconomic History   • Marital status:    Tobacco Use   • Smoking status: Never   • Smokeless tobacco: Never   Vaping Use   • Vaping Use: Never used   Substance and Sexual Activity   • Alcohol use: Yes     Comment: social   • Drug use: No   • Sexual activity: Defer         Current Outpatient Medications   Medication Sig Dispense Refill   • ascorbic acid (VITAMIN C) 1000 MG tablet Take 1,000 mg by mouth Daily.     • Bacillus Coagulans-Inulin (Probiotic) 1-250 BILLION-MG capsule Take 1 capsule by mouth 2 (Two) Times a Day. 30 capsule 1   • Blood Glucose Monitoring Suppl (ONE TOUCH ULTRA 2) w/Device  "kit      • Cholecalciferol 125 MCG (5000 UT) tablet Take 5,000 Units by mouth Daily.     • clindamycin (CLEOCIN) 150 MG capsule Take 1 capsule by mouth 3 (Three) Times a Day. 30 capsule 0   • dronedarone (MULTAQ) 400 MG tablet Take 400 mg by mouth Every Night.     • fexofenadine (ALLEGRA) 180 MG tablet Take 1 tablet by mouth Daily.     • fluticasone (FLONASE) 50 MCG/ACT nasal spray 2 sprays into each nostril As Needed for Rhinitis.     • glimepiride (AMARYL) 4 MG tablet Take 4 mg by mouth.     • losartan (COZAAR) 25 MG tablet Take 25 mg by mouth Daily.     • magnesium oxide (MAG-OX) 400 MG tablet      • metFORMIN (GLUCOPHAGE) 1000 MG tablet Take 1 tablet by mouth 2 (Two) Times a Day With Meals.     • metoprolol succinate XL (TOPROL-XL) 25 MG 24 hr tablet Take 25 mg by mouth Every Night. 0.5 tablet     • Multiple Vitamins-Minerals (ZINC PO) Take  by mouth.     • multivitamin (THERAGRAN) tablet tablet Take  by mouth Daily.     • ONE TOUCH ULTRA TEST test strip USE TO TEST BLOOD SUGAR THREE TIMES A DAY  1   • HYDROcodone-acetaminophen (NORCO) 7.5-325 MG per tablet Take 1 tablet by mouth Every 6 (Six) Hours As Needed for Moderate Pain. 12 tablet 0   • vitamin C (ASCORBIC ACID) 500 MG tablet Take 500 mg by mouth Every Night.       No current facility-administered medications for this visit.       Review of Systems   Constitutional: Negative.    HENT: Negative.    Eyes: Negative.    Respiratory: Negative.    Cardiovascular: Negative.    Gastrointestinal: Negative.    Endocrine: Negative.    Genitourinary: Negative.    Musculoskeletal:        Foot pain    Skin: Negative.    Allergic/Immunologic: Negative.    Neurological: Negative.    Hematological: Negative.    Psychiatric/Behavioral: Negative.          OBJECTIVE    /80   Pulse 83   Ht 193 cm (76\")   Wt (!) 164 kg (362 lb)   SpO2 99%   BMI 44.06 kg/m²     Physical Exam  Vitals reviewed.   Constitutional:       Appearance: Normal appearance. He is " well-developed.   HENT:      Head: Normocephalic and atraumatic.   Neck:      Trachea: Trachea and phonation normal.   Cardiovascular:      Pulses:           Dorsalis pedis pulses are detected w/ Doppler on the left side.        Posterior tibial pulses are detected w/ Doppler on the left side.   Pulmonary:      Effort: Pulmonary effort is normal. No respiratory distress.   Abdominal:      General: There is no distension.      Palpations: Abdomen is soft.   Musculoskeletal:        Feet:    Feet:      Right foot:      Toenail Condition: Right toenails are abnormally thick.      Left foot:      Skin integrity: Ulcer present.      Toenail Condition: Left toenails are abnormally thick.   Skin:     General: Skin is warm and dry.   Neurological:      Mental Status: He is alert and oriented to person, place, and time.      GCS: GCS eye subscore is 4. GCS verbal subscore is 5. GCS motor subscore is 6.   Psychiatric:         Speech: Speech normal.         Behavior: Behavior normal. Behavior is cooperative.         Thought Content: Thought content normal.         Judgment: Judgment normal.          No results found.  .    Procedures       ASSESSMENT AND PLAN     Diagnoses and all orders for this visit:     1. Charcot's joint, left ankle and foot (Primary)     2. S/P transmetatarsal amputation of foot, right (HCC)     3. Type 2 diabetes mellitus with peripheral neuropathy (HCC)     4. Lower extremity edema     5. Wound of left lower extremity, initial encounter    Replace the T CC cast today after repairing the left medial malleolus wound which does have some increased drainage when compared to previous changes.  Patient will continue strict elevation, rest and contact office for worsening symptoms.  Follow-up will be on Friday          This document has been electronically signed by Lito WATSON, FNP-C, ONP-C on February 21, 2023 13:41 CST

## 2023-02-24 ENCOUNTER — OFFICE VISIT (OUTPATIENT)
Dept: PODIATRY | Facility: CLINIC | Age: 63
End: 2023-02-24
Payer: MEDICARE

## 2023-02-24 VITALS — WEIGHT: 315 LBS | BODY MASS INDEX: 38.36 KG/M2 | HEART RATE: 84 BPM | OXYGEN SATURATION: 98 % | HEIGHT: 76 IN

## 2023-02-24 DIAGNOSIS — E11.621 TYPE 2 DIABETES MELLITUS WITH FOOT ULCER, UNSPECIFIED WHETHER LONG TERM INSULIN USE: ICD-10-CM

## 2023-02-24 DIAGNOSIS — L97.521 SKIN ULCER OF LEFT FOOT, LIMITED TO BREAKDOWN OF SKIN: Primary | ICD-10-CM

## 2023-02-24 DIAGNOSIS — Z89.431 S/P TRANSMETATARSAL AMPUTATION OF FOOT, RIGHT: ICD-10-CM

## 2023-02-24 DIAGNOSIS — L97.509 TYPE 2 DIABETES MELLITUS WITH FOOT ULCER, UNSPECIFIED WHETHER LONG TERM INSULIN USE: ICD-10-CM

## 2023-02-24 DIAGNOSIS — M14.672 CHARCOT'S JOINT OF LEFT FOOT: ICD-10-CM

## 2023-02-24 PROCEDURE — 29445 APPL RIGID TOT CNTC LEG CAST: CPT | Performed by: NURSE PRACTITIONER

## 2023-02-24 PROCEDURE — 99212 OFFICE O/P EST SF 10 MIN: CPT | Performed by: NURSE PRACTITIONER

## 2023-02-24 NOTE — PROGRESS NOTES
Emre Lisa  1960  62 y.o. male   PCP:Rasheed Bravo 7/19/2022   BS: 115 per patient     02/24/2023      Chief Complaint   Patient presents with   • Right Foot - Pain, Follow-up       History of Present Illness    Emre Lisa is a 62 y.o.male patient came to clinic for right foot wound         Past Medical History:   Diagnosis Date   • Arthritis    • Atrial fibrillation (HCC)    • Diabetes mellitus (HCC)    • Diabetic foot ulcer associated with type 2 diabetes mellitus (HCC)     left great toe   • Hallux malleus of left foot    • Hammer toe    • Hypertension    • MI (myocardial infarction) (HCC)    • Neuropathy in diabetes (HCC)    • Onychomycosis    • Presence of biventricular cardiac pacemaker    • Rheumatoid arthritis (HCC)          Past Surgical History:   Procedure Laterality Date   • AMPUTATION DIGIT Right 3/5/2017    Procedure: RIGHT AMPUTATION TRANSMETATRASAL , RECESSION GASTROCNEMOUS;  Surgeon: Marco A Thompson DPM;  Location: [facility];  Service:    • CARDIAC DEFIBRILLATOR PLACEMENT  2010, 2016   • CARPAL TUNNEL RELEASE  2015    elbow and wrist left arm   • FOOT IRRIGATION, DEBRIDEMENT AND REPAIR Left 3/7/2018    Procedure: FOOT IRRIGATION, DEBRIDEMENT AND REPAIR;  Surgeon: Marco A Thompson DPM;  Location: [facility];  Service:    • FOOT IRRIGATION, DEBRIDEMENT AND REPAIR Left 3/10/2018    Procedure: FOOT IRRIGATION, DEBRIDEMENT AND REPAIR;  Surgeon: aMrco A Thompson DPM;  Location: [facility] OR;  Service: Podiatry   • FOOT WOUND CLOSURE Right 3/2/2017    Procedure: INCISION AND DRAINAGE, RIGHT FOOT;  Surgeon: Tung Rosenthal DPM;  Location: [facility] OR;  Service:    • HAMMER TOE REPAIR Left 2/15/2018    Procedure: HAMMERTOE CORRECTION LEFT SECOND, THIRD AND FOURTH TOES AND HALLUX INTERPHALANGEAL JOINT ARTHRODESIS LEFT FOOT (Micro Asnis, 4.0 & 5.0 Asnis)      (c-arm);  Surgeon: Marco A Thompson DPM;  Location: [facility];  Service:    • HARDWARE REMOVAL Left 3/5/2018    Procedure: ANKLE/FOOT  HARDWARE REMOVAL;  Surgeon: Marco A Thompson DPM;  Location: [facility] OR;  Service:    • INCISION AND DRAINAGE LEG Left 3/5/2018    Procedure: INCISION AND DRAINAGE LOWER EXTREMITY;  Surgeon: Marco A Thompson DPM;  Location: [facility] OR;  Service:    • PLANTAR FASCIA RELEASE Left 11/21/2019    Procedure: PLANTAR PLANING LEFT FOOT AND ALL OTHER INDICATED PROCEDURES;  Surgeon: Marco A Thompson DPM;  Location: [facility] OR;  Service: Podiatry   • TOE AMPUTATION Right 2016   • TONSILECTOMY, ADENOIDECTOMY, BILATERAL MYRINGOTOMY AND TUBES      age 25         Family History   Problem Relation Age of Onset   • Diabetes Father    • Stroke Father    • No Known Problems Maternal Grandmother    • No Known Problems Maternal Grandfather    • No Known Problems Paternal Grandmother    • No Known Problems Paternal Grandfather    • No Known Problems Daughter    • No Known Problems Daughter    • No Known Problems Son    • Heart disease Other    • Hypertension Other        Allergies   Allergen Reactions   • Penicillins Anaphylaxis   • Januvia [Sitagliptin] Hives   • Lipitor [Atorvastatin] Other (See Comments)     Muscle Cramps...   • Shellfish Allergy Other (See Comments)   • Peanut Oil Rash   • Peanut-Containing Drug Products Rash   • Sulfa Antibiotics Rash       Social History     Socioeconomic History   • Marital status:    Tobacco Use   • Smoking status: Never   • Smokeless tobacco: Never   Vaping Use   • Vaping Use: Never used   Substance and Sexual Activity   • Alcohol use: Yes     Comment: social   • Drug use: No   • Sexual activity: Defer         Current Outpatient Medications   Medication Sig Dispense Refill   • ascorbic acid (VITAMIN C) 1000 MG tablet Take 1,000 mg by mouth Daily.     • Bacillus Coagulans-Inulin (Probiotic) 1-250 BILLION-MG capsule Take 1 capsule by mouth 2 (Two) Times a Day. 30 capsule 1   • Blood Glucose Monitoring Suppl (ONE TOUCH ULTRA 2) w/Device kit      • Cholecalciferol 125 MCG (5000 UT) tablet Take  "5,000 Units by mouth Daily.     • clindamycin (CLEOCIN) 150 MG capsule Take 1 capsule by mouth 3 (Three) Times a Day. 30 capsule 0   • dronedarone (MULTAQ) 400 MG tablet Take 400 mg by mouth Every Night.     • fexofenadine (ALLEGRA) 180 MG tablet Take 1 tablet by mouth Daily.     • fluticasone (FLONASE) 50 MCG/ACT nasal spray 2 sprays into each nostril As Needed for Rhinitis.     • glimepiride (AMARYL) 4 MG tablet Take 4 mg by mouth.     • losartan (COZAAR) 25 MG tablet Take 25 mg by mouth Daily.     • magnesium oxide (MAG-OX) 400 MG tablet      • metFORMIN (GLUCOPHAGE) 1000 MG tablet Take 1 tablet by mouth 2 (Two) Times a Day With Meals.     • metoprolol succinate XL (TOPROL-XL) 25 MG 24 hr tablet Take 25 mg by mouth Every Night. 0.5 tablet     • Multiple Vitamins-Minerals (ZINC PO) Take  by mouth.     • multivitamin (THERAGRAN) tablet tablet Take  by mouth Daily.     • ONE TOUCH ULTRA TEST test strip USE TO TEST BLOOD SUGAR THREE TIMES A DAY  1   • HYDROcodone-acetaminophen (NORCO) 7.5-325 MG per tablet Take 1 tablet by mouth Every 6 (Six) Hours As Needed for Moderate Pain. 12 tablet 0   • vitamin C (ASCORBIC ACID) 500 MG tablet Take 500 mg by mouth Every Night.       No current facility-administered medications for this visit.       Review of Systems   Constitutional: Negative.    HENT: Negative.    Eyes: Negative.    Respiratory: Negative.    Cardiovascular: Negative.    Gastrointestinal: Negative.    Endocrine: Negative.    Genitourinary: Negative.    Musculoskeletal:        Foot pain    Skin: Negative.    Allergic/Immunologic: Negative.    Neurological: Negative.    Hematological: Negative.    Psychiatric/Behavioral: Negative.          OBJECTIVE    Pulse 84   Ht 193 cm (76\")   Wt (!) 164 kg (362 lb)   SpO2 98%   BMI 44.06 kg/m²     Physical Exam  Vitals reviewed.   Constitutional:       Appearance: Normal appearance. He is well-developed.   HENT:      Head: Normocephalic and atraumatic.   Neck:      " Trachea: Trachea and phonation normal.   Cardiovascular:      Pulses:           Dorsalis pedis pulses are detected w/ Doppler on the left side.        Posterior tibial pulses are detected w/ Doppler on the left side.   Pulmonary:      Effort: Pulmonary effort is normal. No respiratory distress.   Abdominal:      General: There is no distension.      Palpations: Abdomen is soft.   Musculoskeletal:        Feet:    Feet:      Right foot:      Toenail Condition: Right toenails are abnormally thick.      Left foot:      Skin integrity: Ulcer present.      Toenail Condition: Left toenails are abnormally thick.   Skin:     General: Skin is warm and dry.   Neurological:      Mental Status: He is alert and oriented to person, place, and time.      GCS: GCS eye subscore is 4. GCS verbal subscore is 5. GCS motor subscore is 6.   Psychiatric:         Speech: Speech normal.         Behavior: Behavior normal. Behavior is cooperative.         Thought Content: Thought content normal.         Judgment: Judgment normal.          No results found.  .    Procedures       ASSESSMENT AND PLAN     Diagnoses and all orders for this visit:     1. Charcot's joint, left ankle and foot (Primary)     2. S/P transmetatarsal amputation of foot, right (HCC)     3. Type 2 diabetes mellitus with peripheral neuropathy (HCC)     4. Lower extremity edema     5. Wound of left lower extremity, initial encounter    Replace the T CC cast today after repairing the left medial malleolus wound which does have some increased drainage when compared to previous changes.  Patient will continue strict elevation, rest and contact office for worsening symptoms.  Follow-up will be on Friday          This document has been electronically signed by Lito WATSON, FNPAniaC, ONP-C on February 24, 2023 09:31 CST

## 2023-02-27 ENCOUNTER — OFFICE VISIT (OUTPATIENT)
Dept: PODIATRY | Facility: CLINIC | Age: 63
End: 2023-02-27
Payer: MEDICARE

## 2023-02-27 VITALS
BODY MASS INDEX: 38.36 KG/M2 | WEIGHT: 315 LBS | OXYGEN SATURATION: 98 % | HEART RATE: 69 BPM | DIASTOLIC BLOOD PRESSURE: 82 MMHG | HEIGHT: 76 IN | SYSTOLIC BLOOD PRESSURE: 139 MMHG

## 2023-02-27 DIAGNOSIS — E11.621 TYPE 2 DIABETES MELLITUS WITH FOOT ULCER, UNSPECIFIED WHETHER LONG TERM INSULIN USE: ICD-10-CM

## 2023-02-27 DIAGNOSIS — M14.672 CHARCOT'S JOINT OF LEFT FOOT: Primary | ICD-10-CM

## 2023-02-27 DIAGNOSIS — Z89.431 S/P TRANSMETATARSAL AMPUTATION OF FOOT, RIGHT: ICD-10-CM

## 2023-02-27 DIAGNOSIS — L97.509 TYPE 2 DIABETES MELLITUS WITH FOOT ULCER, UNSPECIFIED WHETHER LONG TERM INSULIN USE: ICD-10-CM

## 2023-02-27 DIAGNOSIS — S81.802A WOUND OF LEFT LOWER EXTREMITY, INITIAL ENCOUNTER: ICD-10-CM

## 2023-02-27 DIAGNOSIS — L03.116 CELLULITIS OF LEFT FOOT: ICD-10-CM

## 2023-02-27 DIAGNOSIS — B35.1 ONYCHOMYCOSIS: ICD-10-CM

## 2023-02-27 PROCEDURE — 29445 APPL RIGID TOT CNTC LEG CAST: CPT | Performed by: NURSE PRACTITIONER

## 2023-02-27 PROCEDURE — 99212 OFFICE O/P EST SF 10 MIN: CPT | Performed by: NURSE PRACTITIONER

## 2023-02-27 NOTE — PROGRESS NOTES
Emre Lisa  1960  62 y.o. male   PCP:Rasheed Bravo 7/19/2022   BS: 112 per patient     02/27/2023      Chief Complaint   Patient presents with   • Left Foot - Wound Check       History of Present Illness    Emre Lisa is a 62 y.o.male patient came to clinic for right foot wound         Past Medical History:   Diagnosis Date   • Arthritis    • Atrial fibrillation (HCC)    • Diabetes mellitus (HCC)    • Diabetic foot ulcer associated with type 2 diabetes mellitus (HCC)     left great toe   • Hallux malleus of left foot    • Hammer toe    • Hypertension    • MI (myocardial infarction) (HCC)    • Neuropathy in diabetes (HCC)    • Onychomycosis    • Presence of biventricular cardiac pacemaker    • Rheumatoid arthritis (HCC)          Past Surgical History:   Procedure Laterality Date   • AMPUTATION DIGIT Right 3/5/2017    Procedure: RIGHT AMPUTATION TRANSMETATRASAL , RECESSION GASTROCNEMOUS;  Surgeon: Marco A Thompson DPM;  Location: [facility];  Service:    • CARDIAC DEFIBRILLATOR PLACEMENT  2010, 2016   • CARPAL TUNNEL RELEASE  2015    elbow and wrist left arm   • FOOT IRRIGATION, DEBRIDEMENT AND REPAIR Left 3/7/2018    Procedure: FOOT IRRIGATION, DEBRIDEMENT AND REPAIR;  Surgeon: Marco A Thompson DPM;  Location: [facility];  Service:    • FOOT IRRIGATION, DEBRIDEMENT AND REPAIR Left 3/10/2018    Procedure: FOOT IRRIGATION, DEBRIDEMENT AND REPAIR;  Surgeon: Marco A Thompson DPM;  Location: [facility] OR;  Service: Podiatry   • FOOT WOUND CLOSURE Right 3/2/2017    Procedure: INCISION AND DRAINAGE, RIGHT FOOT;  Surgeon: Tung Rosenthal DPM;  Location: [facility] OR;  Service:    • HAMMER TOE REPAIR Left 2/15/2018    Procedure: HAMMERTOE CORRECTION LEFT SECOND, THIRD AND FOURTH TOES AND HALLUX INTERPHALANGEAL JOINT ARTHRODESIS LEFT FOOT (Micro Asnis, 4.0 & 5.0 Asnis)      (c-arm);  Surgeon: Marco A Thompson DPM;  Location: [facility];  Service:    • HARDWARE REMOVAL Left 3/5/2018    Procedure: ANKLE/FOOT HARDWARE  REMOVAL;  Surgeon: Marco A Thompson DPM;  Location: [facility] OR;  Service:    • INCISION AND DRAINAGE LEG Left 3/5/2018    Procedure: INCISION AND DRAINAGE LOWER EXTREMITY;  Surgeon: Marco A Thompson DPM;  Location: [facility] OR;  Service:    • PLANTAR FASCIA RELEASE Left 11/21/2019    Procedure: PLANTAR PLANING LEFT FOOT AND ALL OTHER INDICATED PROCEDURES;  Surgeon: Marco A Thompson DPM;  Location: [facility];  Service: Podiatry   • TOE AMPUTATION Right 2016   • TONSILECTOMY, ADENOIDECTOMY, BILATERAL MYRINGOTOMY AND TUBES      age 25         Family History   Problem Relation Age of Onset   • Diabetes Father    • Stroke Father    • No Known Problems Maternal Grandmother    • No Known Problems Maternal Grandfather    • No Known Problems Paternal Grandmother    • No Known Problems Paternal Grandfather    • No Known Problems Daughter    • No Known Problems Daughter    • No Known Problems Son    • Heart disease Other    • Hypertension Other        Allergies   Allergen Reactions   • Penicillins Anaphylaxis   • Januvia [Sitagliptin] Hives   • Lipitor [Atorvastatin] Other (See Comments)     Muscle Cramps...   • Shellfish Allergy Other (See Comments)   • Peanut Oil Rash   • Peanut-Containing Drug Products Rash   • Sulfa Antibiotics Rash       Social History     Socioeconomic History   • Marital status:    Tobacco Use   • Smoking status: Never   • Smokeless tobacco: Never   Vaping Use   • Vaping Use: Never used   Substance and Sexual Activity   • Alcohol use: Yes     Comment: social   • Drug use: No   • Sexual activity: Defer         Current Outpatient Medications   Medication Sig Dispense Refill   • ascorbic acid (VITAMIN C) 1000 MG tablet Take 1,000 mg by mouth Daily.     • Bacillus Coagulans-Inulin (Probiotic) 1-250 BILLION-MG capsule Take 1 capsule by mouth 2 (Two) Times a Day. 30 capsule 1   • Blood Glucose Monitoring Suppl (ONE TOUCH ULTRA 2) w/Device kit      • Cholecalciferol 125 MCG (5000 UT) tablet Take 5,000 Units  "by mouth Daily.     • clindamycin (CLEOCIN) 150 MG capsule Take 1 capsule by mouth 3 (Three) Times a Day. 30 capsule 0   • dronedarone (MULTAQ) 400 MG tablet Take 400 mg by mouth Every Night.     • fexofenadine (ALLEGRA) 180 MG tablet Take 1 tablet by mouth Daily.     • fluticasone (FLONASE) 50 MCG/ACT nasal spray 2 sprays into each nostril As Needed for Rhinitis.     • glimepiride (AMARYL) 4 MG tablet Take 4 mg by mouth.     • losartan (COZAAR) 25 MG tablet Take 25 mg by mouth Daily.     • magnesium oxide (MAG-OX) 400 MG tablet      • metFORMIN (GLUCOPHAGE) 1000 MG tablet Take 1 tablet by mouth 2 (Two) Times a Day With Meals.     • metoprolol succinate XL (TOPROL-XL) 25 MG 24 hr tablet Take 25 mg by mouth Every Night. 0.5 tablet     • Multiple Vitamins-Minerals (ZINC PO) Take  by mouth.     • multivitamin (THERAGRAN) tablet tablet Take  by mouth Daily.     • ONE TOUCH ULTRA TEST test strip USE TO TEST BLOOD SUGAR THREE TIMES A DAY  1   • HYDROcodone-acetaminophen (NORCO) 7.5-325 MG per tablet Take 1 tablet by mouth Every 6 (Six) Hours As Needed for Moderate Pain. 12 tablet 0   • vitamin C (ASCORBIC ACID) 500 MG tablet Take 500 mg by mouth Every Night.       No current facility-administered medications for this visit.       Review of Systems   Constitutional: Negative.    HENT: Negative.    Eyes: Negative.    Respiratory: Negative.    Cardiovascular: Negative.    Gastrointestinal: Negative.    Endocrine: Negative.    Genitourinary: Negative.    Musculoskeletal:        Foot pain    Skin: Negative.    Allergic/Immunologic: Negative.    Neurological: Negative.    Hematological: Negative.    Psychiatric/Behavioral: Negative.          OBJECTIVE    /82   Pulse 69   Ht 193 cm (76\")   Wt (!) 164 kg (362 lb)   SpO2 98%   BMI 44.06 kg/m²     Physical Exam  Vitals reviewed.   Constitutional:       Appearance: Normal appearance. He is well-developed.   HENT:      Head: Normocephalic and atraumatic.   Neck:      " Trachea: Trachea and phonation normal.   Cardiovascular:      Pulses:           Dorsalis pedis pulses are detected w/ Doppler on the left side.        Posterior tibial pulses are detected w/ Doppler on the left side.   Pulmonary:      Effort: Pulmonary effort is normal. No respiratory distress.   Abdominal:      General: There is no distension.      Palpations: Abdomen is soft.   Musculoskeletal:        Feet:    Feet:      Right foot:      Toenail Condition: Right toenails are abnormally thick.      Left foot:      Skin integrity: Ulcer present.      Toenail Condition: Left toenails are abnormally thick.   Skin:     General: Skin is warm and dry.   Neurological:      Mental Status: He is alert and oriented to person, place, and time.      GCS: GCS eye subscore is 4. GCS verbal subscore is 5. GCS motor subscore is 6.   Psychiatric:         Speech: Speech normal.         Behavior: Behavior normal. Behavior is cooperative.         Thought Content: Thought content normal.         Judgment: Judgment normal.          No results found.  .    Procedures       ASSESSMENT AND PLAN     Diagnoses and all orders for this visit:     1. Charcot's joint, left ankle and foot (Primary)     2. S/P transmetatarsal amputation of foot, right (HCC)     3. Type 2 diabetes mellitus with peripheral neuropathy (HCC)     4. Lower extremity edema     5. Wound of left lower extremity, initial encounter    Replace the T CC cast today after repairing the left medial malleolus wound which does have some increased drainage when compared to previous changes.  Patient will continue strict elevation, rest and contact office for worsening symptoms.  Follow-up will be on Friday          This document has been electronically signed by Lito WATSON, FNP-C, ONP-C on February 27, 2023 09:46 CST

## 2023-03-03 ENCOUNTER — OFFICE VISIT (OUTPATIENT)
Dept: PODIATRY | Facility: CLINIC | Age: 63
End: 2023-03-03
Payer: MEDICARE

## 2023-03-03 VITALS
BODY MASS INDEX: 38.36 KG/M2 | HEIGHT: 76 IN | WEIGHT: 315 LBS | OXYGEN SATURATION: 97 % | HEART RATE: 94 BPM | DIASTOLIC BLOOD PRESSURE: 82 MMHG | SYSTOLIC BLOOD PRESSURE: 123 MMHG

## 2023-03-03 DIAGNOSIS — M86.372 CHRONIC MULTIFOCAL OSTEOMYELITIS OF LEFT FOOT: ICD-10-CM

## 2023-03-03 DIAGNOSIS — M14.672 CHARCOT'S JOINT OF LEFT FOOT: ICD-10-CM

## 2023-03-03 DIAGNOSIS — L03.116 CELLULITIS OF LEFT FOOT: ICD-10-CM

## 2023-03-03 PROCEDURE — 29445 APPL RIGID TOT CNTC LEG CAST: CPT | Performed by: NURSE PRACTITIONER

## 2023-03-03 PROCEDURE — 99212 OFFICE O/P EST SF 10 MIN: CPT | Performed by: NURSE PRACTITIONER

## 2023-03-03 NOTE — PROGRESS NOTES
Emre Lisa  1960  62 y.o. male   PCP:Rasheed Bravo 7/19/2022   BS: 127 per patient     03/03/2023      Chief Complaint   Patient presents with   • Left Foot - Follow-up, Wound Check       History of Present Illness    Emre Lisa is a 62 y.o.male patient came to clinic for left foot wound         Past Medical History:   Diagnosis Date   • Arthritis    • Atrial fibrillation (HCC)    • Diabetes mellitus (HCC)    • Diabetic foot ulcer associated with type 2 diabetes mellitus (HCC)     left great toe   • Hallux malleus of left foot    • Hammer toe    • Hypertension    • MI (myocardial infarction) (HCC)    • Neuropathy in diabetes (HCC)    • Onychomycosis    • Presence of biventricular cardiac pacemaker    • Rheumatoid arthritis (HCC)          Past Surgical History:   Procedure Laterality Date   • AMPUTATION DIGIT Right 3/5/2017    Procedure: RIGHT AMPUTATION TRANSMETATRASAL , RECESSION GASTROCNEMOUS;  Surgeon: Marco A Thompson DPM;  Location: [facility];  Service:    • CARDIAC DEFIBRILLATOR PLACEMENT  2010, 2016   • CARPAL TUNNEL RELEASE  2015    elbow and wrist left arm   • FOOT IRRIGATION, DEBRIDEMENT AND REPAIR Left 3/7/2018    Procedure: FOOT IRRIGATION, DEBRIDEMENT AND REPAIR;  Surgeon: Marco A Thompson DPM;  Location: [facility];  Service:    • FOOT IRRIGATION, DEBRIDEMENT AND REPAIR Left 3/10/2018    Procedure: FOOT IRRIGATION, DEBRIDEMENT AND REPAIR;  Surgeon: Marco A Thompson DPM;  Location: [facility];  Service: Podiatry   • FOOT WOUND CLOSURE Right 3/2/2017    Procedure: INCISION AND DRAINAGE, RIGHT FOOT;  Surgeon: Tung Rosenthal DPM;  Location: [facility] OR;  Service:    • HAMMER TOE REPAIR Left 2/15/2018    Procedure: HAMMERTOE CORRECTION LEFT SECOND, THIRD AND FOURTH TOES AND HALLUX INTERPHALANGEAL JOINT ARTHRODESIS LEFT FOOT (Micro Asnis, 4.0 & 5.0 Asnis)      (c-arm);  Surgeon: Marco A Thompson DPM;  Location: [facility];  Service:    • HARDWARE REMOVAL Left 3/5/2018    Procedure: ANKLE/FOOT  HARDWARE REMOVAL;  Surgeon: Marco A Thompson DPM;  Location: [facility] OR;  Service:    • INCISION AND DRAINAGE LEG Left 3/5/2018    Procedure: INCISION AND DRAINAGE LOWER EXTREMITY;  Surgeon: Marco A Thompson DPM;  Location: [facility] OR;  Service:    • PLANTAR FASCIA RELEASE Left 11/21/2019    Procedure: PLANTAR PLANING LEFT FOOT AND ALL OTHER INDICATED PROCEDURES;  Surgeon: Marco A Thompson DPM;  Location: [facility] OR;  Service: Podiatry   • TOE AMPUTATION Right 2016   • TONSILECTOMY, ADENOIDECTOMY, BILATERAL MYRINGOTOMY AND TUBES      age 25         Family History   Problem Relation Age of Onset   • Diabetes Father    • Stroke Father    • No Known Problems Maternal Grandmother    • No Known Problems Maternal Grandfather    • No Known Problems Paternal Grandmother    • No Known Problems Paternal Grandfather    • No Known Problems Daughter    • No Known Problems Daughter    • No Known Problems Son    • Heart disease Other    • Hypertension Other        Allergies   Allergen Reactions   • Penicillins Anaphylaxis   • Januvia [Sitagliptin] Hives   • Lipitor [Atorvastatin] Other (See Comments)     Muscle Cramps...   • Shellfish Allergy Other (See Comments)   • Peanut Oil Rash   • Peanut-Containing Drug Products Rash   • Sulfa Antibiotics Rash       Social History     Socioeconomic History   • Marital status:    Tobacco Use   • Smoking status: Never   • Smokeless tobacco: Never   Vaping Use   • Vaping Use: Never used   Substance and Sexual Activity   • Alcohol use: Yes     Comment: social   • Drug use: No   • Sexual activity: Defer         Current Outpatient Medications   Medication Sig Dispense Refill   • ascorbic acid (VITAMIN C) 1000 MG tablet Take 1 tablet by mouth Daily.     • Bacillus Coagulans-Inulin (Probiotic) 1-250 BILLION-MG capsule Take 1 capsule by mouth 2 (Two) Times a Day. 30 capsule 1   • Blood Glucose Monitoring Suppl (ONE TOUCH ULTRA 2) w/Device kit      • Cholecalciferol 125 MCG (5000 UT) tablet Take 1  "tablet by mouth Daily.     • clindamycin (CLEOCIN) 150 MG capsule Take 1 capsule by mouth 3 (Three) Times a Day. 30 capsule 0   • dronedarone (MULTAQ) 400 MG tablet Take 1 tablet by mouth Every Night.     • fexofenadine (ALLEGRA) 180 MG tablet Take 1 tablet by mouth Daily.     • fluticasone (FLONASE) 50 MCG/ACT nasal spray 2 sprays into the nostril(s) as directed by provider As Needed for Rhinitis.     • glimepiride (AMARYL) 4 MG tablet Take 1 tablet by mouth.     • losartan (COZAAR) 25 MG tablet Take 1 tablet by mouth Daily.     • magnesium oxide (MAG-OX) 400 MG tablet      • metFORMIN (GLUCOPHAGE) 1000 MG tablet Take 1 tablet by mouth 2 (Two) Times a Day With Meals.     • metoprolol succinate XL (TOPROL-XL) 25 MG 24 hr tablet Take 1 tablet by mouth Every Night. 0.5 tablet     • Multiple Vitamins-Minerals (ZINC PO) Take  by mouth.     • multivitamin (THERAGRAN) tablet tablet Take  by mouth Daily.     • ONE TOUCH ULTRA TEST test strip USE TO TEST BLOOD SUGAR THREE TIMES A DAY  1   • HYDROcodone-acetaminophen (NORCO) 7.5-325 MG per tablet Take 1 tablet by mouth Every 6 (Six) Hours As Needed for Moderate Pain. 12 tablet 0   • vitamin C (ASCORBIC ACID) 500 MG tablet Take 500 mg by mouth Every Night.       No current facility-administered medications for this visit.       Review of Systems   Constitutional: Negative.    HENT: Negative.    Eyes: Negative.    Respiratory: Negative.    Cardiovascular: Negative.    Gastrointestinal: Negative.    Endocrine: Negative.    Genitourinary: Negative.    Musculoskeletal:        Foot pain    Skin: Negative.    Allergic/Immunologic: Negative.    Neurological: Negative.    Hematological: Negative.    Psychiatric/Behavioral: Negative.          OBJECTIVE    /82   Pulse 94   Ht 193 cm (76\")   Wt (!) 164 kg (362 lb)   SpO2 97%   BMI 44.06 kg/m²     Physical Exam  Vitals reviewed.   Constitutional:       Appearance: Normal appearance. He is well-developed.   HENT:      Head: " Normocephalic and atraumatic.   Neck:      Trachea: Trachea and phonation normal.   Cardiovascular:      Pulses:           Dorsalis pedis pulses are detected w/ Doppler on the left side.        Posterior tibial pulses are detected w/ Doppler on the left side.   Pulmonary:      Effort: Pulmonary effort is normal. No respiratory distress.   Abdominal:      General: There is no distension.      Palpations: Abdomen is soft.   Musculoskeletal:        Feet:    Feet:      Right foot:      Toenail Condition: Right toenails are abnormally thick.      Left foot:      Skin integrity: Ulcer present.      Toenail Condition: Left toenails are abnormally thick.   Skin:     General: Skin is warm and dry.   Neurological:      Mental Status: He is alert and oriented to person, place, and time.      GCS: GCS eye subscore is 4. GCS verbal subscore is 5. GCS motor subscore is 6.   Psychiatric:         Speech: Speech normal.         Behavior: Behavior normal. Behavior is cooperative.         Thought Content: Thought content normal.         Judgment: Judgment normal.          No results found.  .    Procedures       ASSESSMENT AND PLAN     Diagnoses and all orders for this visit:     1. Charcot's joint, left ankle and foot (Primary)     2. S/P transmetatarsal amputation of foot, right (HCC)     3. Type 2 diabetes mellitus with peripheral neuropathy (HCC)     4. Lower extremity edema     5. Wound of left lower extremity, initial encounter    Replace the TCC cast today after   Patient will continue strict elevation, rest and contact office for worsening symptoms.   Contact office for worsening symptoms         This document has been electronically signed by Lito WATSON, FNP-C, ONP-C on March 3, 2023 10:48 CST

## 2023-03-08 ENCOUNTER — OFFICE VISIT (OUTPATIENT)
Dept: PODIATRY | Facility: CLINIC | Age: 63
End: 2023-03-08
Payer: MEDICARE

## 2023-03-08 VITALS — BODY MASS INDEX: 38.36 KG/M2 | HEIGHT: 76 IN | OXYGEN SATURATION: 99 % | HEART RATE: 74 BPM | WEIGHT: 315 LBS

## 2023-03-08 DIAGNOSIS — E11.621 TYPE 2 DIABETES MELLITUS WITH FOOT ULCER, UNSPECIFIED WHETHER LONG TERM INSULIN USE: ICD-10-CM

## 2023-03-08 DIAGNOSIS — L03.116 CELLULITIS OF LEFT FOOT: ICD-10-CM

## 2023-03-08 DIAGNOSIS — L97.509 TYPE 2 DIABETES MELLITUS WITH FOOT ULCER, UNSPECIFIED WHETHER LONG TERM INSULIN USE: ICD-10-CM

## 2023-03-08 DIAGNOSIS — M14.672 CHARCOT'S JOINT OF LEFT FOOT: Primary | ICD-10-CM

## 2023-03-08 PROCEDURE — 99214 OFFICE O/P EST MOD 30 MIN: CPT | Performed by: NURSE PRACTITIONER

## 2023-03-08 NOTE — PROGRESS NOTES
Emre Lisa  1960  62 y.o. male   PCP:Rasheed Bravo 7/19/2022   BS: 115 per patient     03/08/2023        Chief Complaint   Patient presents with   • Left Foot - Pain, Follow-up, Wound Check, Skin Ulcer       History of Present Illness    Emre Lisa is a 62 y.o.male patient came to clinic for left foot wound     62-year-old  male in the office today for follow-up evaluation of the left lateral malleoli are wound.  The other 3 wounds on his left ankle and foot have healed.  He continues to modify his activity and weightbearing, he has no complaints of pain at this time and has been in a TCC cast for several weeks now with weekly to biweekly wound checks.  He continue swelling modalities currently denies any fever, and chills or evidence of infection. He reports that he is ready to proceed with a surgical evaluation by Dr. Thompson.        Past Medical History:   Diagnosis Date   • Arthritis    • Atrial fibrillation ([facility])    • Diabetes mellitus ([facility])    • Diabetic foot ulcer associated with type 2 diabetes mellitus ([facility])     left great toe   • Hallux malleus of left foot    • Hammer toe    • Hypertension    • MI (myocardial infarction) ([facility])    • Neuropathy in diabetes ([facility])    • Onychomycosis    • Presence of biventricular cardiac pacemaker    • Rheumatoid arthritis ([facility])          Past Surgical History:   Procedure Laterality Date   • AMPUTATION DIGIT Right 3/5/2017    Procedure: RIGHT AMPUTATION TRANSMETATRASAL , RECESSION GASTROCNEMOUS;  Surgeon: Marco A Thompson DPM;  Location: [facility];  Service:    • CARDIAC DEFIBRILLATOR PLACEMENT  2010, 2016   • CARPAL TUNNEL RELEASE  2015    elbow and wrist left arm   • FOOT IRRIGATION, DEBRIDEMENT AND REPAIR Left 3/7/2018    Procedure: FOOT IRRIGATION, DEBRIDEMENT AND REPAIR;  Surgeon: Macro A Thompson DPM;  Location: [facility];  Service:    • FOOT IRRIGATION, DEBRIDEMENT AND REPAIR Left 3/10/2018    Procedure: FOOT IRRIGATION, DEBRIDEMENT AND REPAIR;   Surgeon: Marco A Thompson DPM;  Location: [facility] OR;  Service: Podiatry   • FOOT WOUND CLOSURE Right 3/2/2017    Procedure: INCISION AND DRAINAGE, RIGHT FOOT;  Surgeon: Tung Rosenthal DPM;  Location: [facility] OR;  Service:    • HAMMER TOE REPAIR Left 2/15/2018    Procedure: HAMMERTOE CORRECTION LEFT SECOND, THIRD AND FOURTH TOES AND HALLUX INTERPHALANGEAL JOINT ARTHRODESIS LEFT FOOT (Micro Asnis, 4.0 & 5.0 Asnis)      (c-arm);  Surgeon: Marco A Thompson DPM;  Location: [facility] OR;  Service:    • HARDWARE REMOVAL Left 3/5/2018    Procedure: ANKLE/FOOT HARDWARE REMOVAL;  Surgeon: Marco A Thompson DPM;  Location: [facility] OR;  Service:    • INCISION AND DRAINAGE LEG Left 3/5/2018    Procedure: INCISION AND DRAINAGE LOWER EXTREMITY;  Surgeon: Marco A Thompson DPM;  Location: [facility] OR;  Service:    • PLANTAR FASCIA RELEASE Left 11/21/2019    Procedure: PLANTAR PLANING LEFT FOOT AND ALL OTHER INDICATED PROCEDURES;  Surgeon: Marco A Thompson DPM;  Location: [facility] OR;  Service: Podiatry   • TOE AMPUTATION Right 2016   • TONSILECTOMY, ADENOIDECTOMY, BILATERAL MYRINGOTOMY AND TUBES      age 25         Family History   Problem Relation Age of Onset   • Diabetes Father    • Stroke Father    • No Known Problems Maternal Grandmother    • No Known Problems Maternal Grandfather    • No Known Problems Paternal Grandmother    • No Known Problems Paternal Grandfather    • No Known Problems Daughter    • No Known Problems Daughter    • No Known Problems Son    • Heart disease Other    • Hypertension Other        Allergies   Allergen Reactions   • Penicillins Anaphylaxis   • Januvia [Sitagliptin] Hives   • Lipitor [Atorvastatin] Other (See Comments)     Muscle Cramps...   • Shellfish Allergy Other (See Comments)   • Peanut Oil Rash   • Peanut-Containing Drug Products Rash   • Sulfa Antibiotics Rash       Social History     Socioeconomic History   • Marital status:    Tobacco Use   • Smoking status: Never   • Smokeless tobacco:  Never   Vaping Use   • Vaping Use: Never used   Substance and Sexual Activity   • Alcohol use: Yes     Comment: social   • Drug use: No   • Sexual activity: Defer         Current Outpatient Medications   Medication Sig Dispense Refill   • ascorbic acid (VITAMIN C) 1000 MG tablet Take 1 tablet by mouth Daily.     • Bacillus Coagulans-Inulin (Probiotic) 1-250 BILLION-MG capsule Take 1 capsule by mouth 2 (Two) Times a Day. 30 capsule 1   • Blood Glucose Monitoring Suppl (ONE TOUCH ULTRA 2) w/Device kit      • Cholecalciferol 125 MCG (5000 UT) tablet Take 1 tablet by mouth Daily.     • clindamycin (CLEOCIN) 150 MG capsule Take 1 capsule by mouth 3 (Three) Times a Day. 30 capsule 0   • dronedarone (MULTAQ) 400 MG tablet Take 1 tablet by mouth Every Night.     • fexofenadine (ALLEGRA) 180 MG tablet Take 1 tablet by mouth Daily.     • fluticasone (FLONASE) 50 MCG/ACT nasal spray 2 sprays into the nostril(s) as directed by provider As Needed for Rhinitis.     • glimepiride (AMARYL) 4 MG tablet Take 1 tablet by mouth.     • losartan (COZAAR) 25 MG tablet Take 1 tablet by mouth Daily.     • magnesium oxide (MAG-OX) 400 MG tablet      • metFORMIN (GLUCOPHAGE) 1000 MG tablet Take 1 tablet by mouth 2 (Two) Times a Day With Meals.     • metoprolol succinate XL (TOPROL-XL) 25 MG 24 hr tablet Take 1 tablet by mouth Every Night. 0.5 tablet     • Multiple Vitamins-Minerals (ZINC PO) Take  by mouth.     • multivitamin (THERAGRAN) tablet tablet Take  by mouth Daily.     • ONE TOUCH ULTRA TEST test strip USE TO TEST BLOOD SUGAR THREE TIMES A DAY  1   • HYDROcodone-acetaminophen (NORCO) 7.5-325 MG per tablet Take 1 tablet by mouth Every 6 (Six) Hours As Needed for Moderate Pain. 12 tablet 0   • vitamin C (ASCORBIC ACID) 500 MG tablet Take 500 mg by mouth Every Night.       No current facility-administered medications for this visit.       Review of Systems   Constitutional: Negative.    HENT: Negative.    Eyes: Negative.    Respiratory:  "Negative.    Cardiovascular: Negative.    Gastrointestinal: Negative.    Endocrine: Negative.    Genitourinary: Negative.    Musculoskeletal:        Foot pain    Skin: Negative.    Allergic/Immunologic: Negative.    Neurological: Negative.    Hematological: Negative.    Psychiatric/Behavioral: Negative.          OBJECTIVE    Pulse 74   Ht 193 cm (76\")   Wt (!) 164 kg (362 lb)   SpO2 99%   BMI 44.06 kg/m²     Physical Exam  Vitals reviewed.   Constitutional:       Appearance: Normal appearance. He is well-developed.   HENT:      Head: Normocephalic and atraumatic.   Neck:      Trachea: Trachea and phonation normal.   Cardiovascular:      Pulses:           Dorsalis pedis pulses are detected w/ Doppler on the left side.        Posterior tibial pulses are detected w/ Doppler on the left side.   Pulmonary:      Effort: Pulmonary effort is normal. No respiratory distress.   Abdominal:      General: There is no distension.      Palpations: Abdomen is soft.   Musculoskeletal:        Feet:    Feet:      Right foot:      Toenail Condition: Right toenails are abnormally thick.      Left foot:      Skin integrity: Ulcer present.      Toenail Condition: Left toenails are abnormally thick.   Skin:     General: Skin is warm and dry.   Neurological:      Mental Status: He is alert and oriented to person, place, and time.      GCS: GCS eye subscore is 4. GCS verbal subscore is 5. GCS motor subscore is 6.   Psychiatric:         Speech: Speech normal.         Behavior: Behavior normal. Behavior is cooperative.         Thought Content: Thought content normal.         Judgment: Judgment normal.          No results found.  .    Procedures    Reviewed x-rays today with Dr. Thompson         ASSESSMENT AND PLAN     Diagnoses and all orders for this visit:     1. Charcot's joint, left ankle and foot (Primary)     2. S/P transmetatarsal amputation of foot, right (HCC)     3. Type 2 diabetes mellitus with peripheral neuropathy (HCC)     4. " Lower extremity edema     5. Wound of left lower extremity, initial encounter    Replace the TCC cast today after   Patient will continue strict elevation, rest and contact office for worsening symptoms.   Contact office for worsening symptoms       After long discussion with the patient, review of his x-rays with Dr. Donna Thompson has recommended proceeding with fusion of the ankle/foot at this point.  Risk benefits and treatment options were discussed with the patient detail and he verbalized understanding of those.  We discussed the need for possible long-term care until the patient is completely healed for period of at least 6 weeks maybe longer.  He will need assistive devices including a wheelchair, walker and a bedside commode.  He was placed back into the TCC cast today after the office visit and will follow-up next week for recheck unless they proceed with surgery prior to that date.    The risks, benefits, and alternatives of this procedure have been discussed with the patient or the responsible party- they understand and agrees to proceed. No guarantees given or implied.     Preop orders/case request placed        This document has been electronically signed by Lito WATSON, FNP-C, ONP-C on March 9, 2023 12:05 CST

## 2023-03-14 ENCOUNTER — OFFICE VISIT (OUTPATIENT)
Dept: PODIATRY | Facility: CLINIC | Age: 63
End: 2023-03-14
Payer: MEDICARE

## 2023-03-14 VITALS
WEIGHT: 315 LBS | OXYGEN SATURATION: 97 % | HEIGHT: 76 IN | HEART RATE: 89 BPM | BODY MASS INDEX: 38.36 KG/M2 | SYSTOLIC BLOOD PRESSURE: 130 MMHG | DIASTOLIC BLOOD PRESSURE: 71 MMHG

## 2023-03-14 DIAGNOSIS — Z89.431 S/P TRANSMETATARSAL AMPUTATION OF FOOT, RIGHT: ICD-10-CM

## 2023-03-14 DIAGNOSIS — M14.672 CHARCOT'S JOINT OF LEFT FOOT: Primary | ICD-10-CM

## 2023-03-14 DIAGNOSIS — L97.521 SKIN ULCER OF LEFT FOOT, LIMITED TO BREAKDOWN OF SKIN: ICD-10-CM

## 2023-03-14 DIAGNOSIS — E11.621 TYPE 2 DIABETES MELLITUS WITH FOOT ULCER, UNSPECIFIED WHETHER LONG TERM INSULIN USE: ICD-10-CM

## 2023-03-14 DIAGNOSIS — L97.509 TYPE 2 DIABETES MELLITUS WITH FOOT ULCER, UNSPECIFIED WHETHER LONG TERM INSULIN USE: ICD-10-CM

## 2023-03-14 PROCEDURE — 29445 APPL RIGID TOT CNTC LEG CAST: CPT | Performed by: NURSE PRACTITIONER

## 2023-03-14 PROCEDURE — 99212 OFFICE O/P EST SF 10 MIN: CPT | Performed by: NURSE PRACTITIONER

## 2023-03-14 NOTE — PROGRESS NOTES
Emre Lisa  1960  63 y.o. male   PCP:Rasheed Bravo 7/19/2022   BS: 113 per patient     3/14/2023         Chief Complaint   Patient presents with   • Left Foot - Follow-up, Wound Check       History of Present Illness    Emre Lisa is a 63 y.o.male patient came to clinic for left foot wound. Patient was placed in a TCC            Past Medical History:   Diagnosis Date   • Arthritis    • Atrial fibrillation (HCC)    • Diabetes mellitus (HCC)    • Diabetic foot ulcer associated with type 2 diabetes mellitus (HCC)     left great toe   • Hallux malleus of left foot    • Hammer toe    • Hypertension    • MI (myocardial infarction) (HCC)    • Neuropathy in diabetes (HCC)    • Onychomycosis    • Presence of biventricular cardiac pacemaker    • Rheumatoid arthritis (HCC)          Past Surgical History:   Procedure Laterality Date   • AMPUTATION DIGIT Right 3/5/2017    Procedure: RIGHT AMPUTATION TRANSMETATRASAL , RECESSION GASTROCNEMOUS;  Surgeon: Marco A Thompson DPM;  Location: [facility];  Service:    • CARDIAC DEFIBRILLATOR PLACEMENT  2010, 2016   • CARPAL TUNNEL RELEASE  2015    elbow and wrist left arm   • FOOT IRRIGATION, DEBRIDEMENT AND REPAIR Left 3/7/2018    Procedure: FOOT IRRIGATION, DEBRIDEMENT AND REPAIR;  Surgeon: Marco A Thompson DPM;  Location: [facility];  Service:    • FOOT IRRIGATION, DEBRIDEMENT AND REPAIR Left 3/10/2018    Procedure: FOOT IRRIGATION, DEBRIDEMENT AND REPAIR;  Surgeon: Marco A Thompson DPM;  Location: [facility] OR;  Service: Podiatry   • FOOT WOUND CLOSURE Right 3/2/2017    Procedure: INCISION AND DRAINAGE, RIGHT FOOT;  Surgeon: Tung Rosenthal DPM;  Location: [facility] OR;  Service:    • HAMMER TOE REPAIR Left 2/15/2018    Procedure: HAMMERTOE CORRECTION LEFT SECOND, THIRD AND FOURTH TOES AND HALLUX INTERPHALANGEAL JOINT ARTHRODESIS LEFT FOOT (Micro Asnis, 4.0 & 5.0 Asnis)      (c-arm);  Surgeon: Marco A Thompson DPM;  Location: [facility] OR;  Service:    • HARDWARE REMOVAL Left  3/5/2018    Procedure: ANKLE/FOOT HARDWARE REMOVAL;  Surgeon: Marco A Thompson DPM;  Location: [facility] OR;  Service:    • INCISION AND DRAINAGE LEG Left 3/5/2018    Procedure: INCISION AND DRAINAGE LOWER EXTREMITY;  Surgeon: Marco A Thompson DPM;  Location: [facility];  Service:    • PLANTAR FASCIA RELEASE Left 11/21/2019    Procedure: PLANTAR PLANING LEFT FOOT AND ALL OTHER INDICATED PROCEDURES;  Surgeon: Marco A Thompson DPM;  Location: [facility];  Service: Podiatry   • TOE AMPUTATION Right 2016   • TONSILECTOMY, ADENOIDECTOMY, BILATERAL MYRINGOTOMY AND TUBES      age 25         Family History   Problem Relation Age of Onset   • Diabetes Father    • Stroke Father    • No Known Problems Maternal Grandmother    • No Known Problems Maternal Grandfather    • No Known Problems Paternal Grandmother    • No Known Problems Paternal Grandfather    • No Known Problems Daughter    • No Known Problems Daughter    • No Known Problems Son    • Heart disease Other    • Hypertension Other        Allergies   Allergen Reactions   • Penicillins Anaphylaxis   • Januvia [Sitagliptin] Hives   • Lipitor [Atorvastatin] Other (See Comments)     Muscle Cramps...   • Shellfish Allergy Other (See Comments)   • Peanut Oil Rash   • Peanut-Containing Drug Products Rash   • Sulfa Antibiotics Rash       Social History     Socioeconomic History   • Marital status:    Tobacco Use   • Smoking status: Never   • Smokeless tobacco: Never   Vaping Use   • Vaping Use: Never used   Substance and Sexual Activity   • Alcohol use: Yes     Comment: social   • Drug use: No   • Sexual activity: Defer         Current Outpatient Medications   Medication Sig Dispense Refill   • ascorbic acid (VITAMIN C) 1000 MG tablet Take 1 tablet by mouth Daily.     • Bacillus Coagulans-Inulin (Probiotic) 1-250 BILLION-MG capsule Take 1 capsule by mouth 2 (Two) Times a Day. 30 capsule 1   • Blood Glucose Monitoring Suppl (ONE TOUCH ULTRA 2) w/Device kit      • Cholecalciferol  "125 MCG (5000 UT) tablet Take 1 tablet by mouth Daily.     • clindamycin (CLEOCIN) 150 MG capsule Take 1 capsule by mouth 3 (Three) Times a Day. 30 capsule 0   • dronedarone (MULTAQ) 400 MG tablet Take 1 tablet by mouth Every Night.     • fexofenadine (ALLEGRA) 180 MG tablet Take 1 tablet by mouth Daily.     • fluticasone (FLONASE) 50 MCG/ACT nasal spray 2 sprays into the nostril(s) as directed by provider As Needed for Rhinitis.     • glimepiride (AMARYL) 4 MG tablet Take 1 tablet by mouth.     • losartan (COZAAR) 25 MG tablet Take 1 tablet by mouth Daily.     • magnesium oxide (MAG-OX) 400 MG tablet      • metFORMIN (GLUCOPHAGE) 1000 MG tablet Take 1 tablet by mouth 2 (Two) Times a Day With Meals.     • metoprolol succinate XL (TOPROL-XL) 25 MG 24 hr tablet Take 1 tablet by mouth Every Night. 0.5 tablet     • Multiple Vitamins-Minerals (ZINC PO) Take  by mouth.     • multivitamin (THERAGRAN) tablet tablet Take  by mouth Daily.     • ONE TOUCH ULTRA TEST test strip USE TO TEST BLOOD SUGAR THREE TIMES A DAY  1   • HYDROcodone-acetaminophen (NORCO) 7.5-325 MG per tablet Take 1 tablet by mouth Every 6 (Six) Hours As Needed for Moderate Pain. 12 tablet 0   • vitamin C (ASCORBIC ACID) 500 MG tablet Take 500 mg by mouth Every Night.       No current facility-administered medications for this visit.       Review of Systems   Constitutional: Negative.    HENT: Negative.    Eyes: Negative.    Respiratory: Negative.    Cardiovascular: Negative.    Gastrointestinal: Negative.    Endocrine: Negative.    Genitourinary: Negative.    Musculoskeletal:        Foot pain    Skin: Negative.    Allergic/Immunologic: Negative.    Neurological: Negative.    Hematological: Negative.    Psychiatric/Behavioral: Negative.          OBJECTIVE    /71   Pulse 89   Ht 193 cm (76\")   Wt (!) 362 kg (798 lb 1 oz)   SpO2 97%   BMI 97.14 kg/m²     Physical Exam  Vitals reviewed.   Constitutional:       Appearance: Normal appearance. He is " well-developed.   HENT:      Head: Normocephalic and atraumatic.   Neck:      Trachea: Trachea and phonation normal.   Cardiovascular:      Pulses:           Dorsalis pedis pulses are detected w/ Doppler on the left side.        Posterior tibial pulses are detected w/ Doppler on the left side.   Pulmonary:      Effort: Pulmonary effort is normal. No respiratory distress.   Abdominal:      General: There is no distension.      Palpations: Abdomen is soft.   Musculoskeletal:        Feet:    Feet:      Right foot:      Toenail Condition: Right toenails are abnormally thick.      Left foot:      Skin integrity: Ulcer present.      Toenail Condition: Left toenails are abnormally thick.   Skin:     General: Skin is warm and dry.   Neurological:      Mental Status: He is alert and oriented to person, place, and time.      GCS: GCS eye subscore is 4. GCS verbal subscore is 5. GCS motor subscore is 6.   Psychiatric:         Speech: Speech normal.         Behavior: Behavior normal. Behavior is cooperative.         Thought Content: Thought content normal.         Judgment: Judgment normal.          XR Ankle 3+ View Left    Result Date: 3/10/2023  Narrative: PROCEDURE: XR ANKLE 3+ VW LEFT VIEWS: 3 INDICATION: Pain, open wound, cellulitis COMPARISON: 1/4/2023 FINDINGS:   - fracture: Fragmentation and collapse of the talus. No new osseous abnormalities. Continued fragmentation of the midfoot with degenerative changes and joint space narrowing consistent with Charcot joints. Lucency posterior to the calcaneus may reflect reported soft tissue defect.   - alignment: Within normal limits   - misc: Age-related degenerative changes. Soft tissue calcifications or ossifications posterior to the distal tibia and fibula are present, as before. Calcaneal enthesophytes and calcifications in the plantar soft tissues. Slightly increased soft tissue swelling since the previous study. Vascular calcification is noted     Impression: Osseous  fragmentation and malalignment of the midfoot, unchanged from the previous study, and consistent with reported Charcot joint changes. No acute osseous abnormality, but there is increased soft tissue swelling overlying the superior aspect of the foot. There may be a soft tissue defect posterior to the calcaneus. Clinical correlation needed. Note:  if pain or symptoms persist beyond reasonable expectations and follow-up imaging is anticipated,  cross sectional imaging  (CT and/or MRI) is suggested, as is deemed clinically appropriate. Electronically signed by:  Ayla Degroot MD  3/10/2023 3:10 PM [facility] Workstation: [id number]    XR Foot 3+ View Left    Result Date: 3/10/2023  Narrative: PROCEDURE: XR FOOT 3+ VW LEFT VIEWS: 3 INDICATION: Pain, left ankle wound, cellulitis COMPARISON: 12/8/2021 and 1/4/2023 FINDINGS:   - fracture: Destruction of the previously fused interphalangeal joint of the first digit, with medial subluxation of the distal phalanx thought to the proximal phalanx. Degenerative change of the first metatarsophalangeal joint. Deformity of the fifth metatarsal head appears similar to prior study. Increased destruction and fragmentation involving the talus compared to the previous study - alignment: Interval change of alignment of the first digit, as above. Pes planus deformity   - misc: Severe fragmentation and intertarsal joint space narrowing. Large osteophyte anterior the tibiotalar joint. Severe joint space narrowing of the talonavicular joint. Numerous calcific/ossific densities posterior to the distal tibia. Large calcaneal enthesophytes at the insertion of the plantar aponeurosis and the Achilles tendon. Calcifications of the plantar soft tissues. Soft tissue swelling is seen superior to the metatarsals. Diffuse osteopenia. Vascular calcification. There may be a soft tissue defect posterior to the calcaneus     Impression: Changes of Charcot foot, increased from the previous study of 12/8/2021, and  not significantly changed from the ankle radiograph of 1/4/2023. Pes planus. Severe soft tissue swelling overlying the metatarsals. Electronically signed by:  Ayla Degroot MD  3/10/2023 4:04 PM [facility] Workstation: [id number]    .    Procedures    Reviewed x-rays today with Dr. Thompson         ASSESSMENT AND PLAN     Diagnoses and all orders for this visit:     1. Charcot's joint, left ankle and foot (Primary)     2. S/P transmetatarsal amputation of foot, right (HCC)     3. Type 2 diabetes mellitus with peripheral neuropathy (HCC)     4. Lower extremity edema     5. Wound of left lower extremity, initial encounter    Replace the TCC cast today after   Patient will continue strict elevation, rest and contact office for worsening symptoms.   Contact office for worsening symptoms           This document has been electronically signed by Liot WATSON, FNP-C, ONP-C on March 14, 2023 12:00 CDT

## 2023-03-21 ENCOUNTER — OFFICE VISIT (OUTPATIENT)
Dept: PODIATRY | Facility: CLINIC | Age: 63
End: 2023-03-21
Payer: MEDICARE

## 2023-03-21 VITALS
DIASTOLIC BLOOD PRESSURE: 90 MMHG | BODY MASS INDEX: 38.36 KG/M2 | SYSTOLIC BLOOD PRESSURE: 145 MMHG | HEART RATE: 89 BPM | HEIGHT: 76 IN | WEIGHT: 315 LBS | OXYGEN SATURATION: 96 %

## 2023-03-21 DIAGNOSIS — M14.672 CHARCOT'S JOINT OF LEFT FOOT: ICD-10-CM

## 2023-03-21 DIAGNOSIS — L97.509 TYPE 2 DIABETES MELLITUS WITH FOOT ULCER, UNSPECIFIED WHETHER LONG TERM INSULIN USE: Primary | ICD-10-CM

## 2023-03-21 DIAGNOSIS — L97.521 SKIN ULCER OF LEFT FOOT, LIMITED TO BREAKDOWN OF SKIN: ICD-10-CM

## 2023-03-21 DIAGNOSIS — B35.1 ONYCHOMYCOSIS: ICD-10-CM

## 2023-03-21 DIAGNOSIS — S81.802A WOUND OF LEFT LOWER EXTREMITY, INITIAL ENCOUNTER: ICD-10-CM

## 2023-03-21 DIAGNOSIS — Z89.431 S/P TRANSMETATARSAL AMPUTATION OF FOOT, RIGHT: ICD-10-CM

## 2023-03-21 DIAGNOSIS — E11.621 TYPE 2 DIABETES MELLITUS WITH FOOT ULCER, UNSPECIFIED WHETHER LONG TERM INSULIN USE: Primary | ICD-10-CM

## 2023-03-21 DIAGNOSIS — L03.116 CELLULITIS OF LEFT FOOT: ICD-10-CM

## 2023-03-21 PROCEDURE — 99213 OFFICE O/P EST LOW 20 MIN: CPT | Performed by: NURSE PRACTITIONER

## 2023-03-21 PROCEDURE — 29445 APPL RIGID TOT CNTC LEG CAST: CPT | Performed by: NURSE PRACTITIONER

## 2023-03-21 RX ORDER — CLINDAMYCIN HYDROCHLORIDE 300 MG/1
300 CAPSULE ORAL 3 TIMES DAILY
Qty: 30 CAPSULE | Refills: 0 | Status: SHIPPED | OUTPATIENT
Start: 2023-03-21 | End: 2023-03-29

## 2023-03-21 RX ORDER — CLINDAMYCIN HYDROCHLORIDE 300 MG/1
300 CAPSULE ORAL 3 TIMES DAILY
Qty: 30 CAPSULE | Refills: 0 | Status: SHIPPED | OUTPATIENT
Start: 2023-03-21 | End: 2023-03-21 | Stop reason: SDUPTHER

## 2023-03-21 NOTE — PROGRESS NOTES
Emre Lisa  1960  63 y.o. male   PCP:Rasheed Bravo 7/19/2022   BS: 121per patient     3/14/2023         Chief Complaint   Patient presents with   • Left Foot - Wound Check       History of Present Illness    Emre Lisa is a 63 y.o.male patient came to clinic for left foot wound. Patient was placed in a TCC            Past Medical History:   Diagnosis Date   • Arthritis    • Atrial fibrillation (HCC)    • Diabetes mellitus (HCC)    • Diabetic foot ulcer associated with type 2 diabetes mellitus (HCC)     left great toe   • Hallux malleus of left foot    • Hammer toe    • Hypertension    • MI (myocardial infarction) (HCC)    • Neuropathy in diabetes (HCC)    • Onychomycosis    • Presence of biventricular cardiac pacemaker    • Rheumatoid arthritis (HCC)          Past Surgical History:   Procedure Laterality Date   • AMPUTATION DIGIT Right 3/5/2017    Procedure: RIGHT AMPUTATION TRANSMETATRASAL , RECESSION GASTROCNEMOUS;  Surgeon: Marco A Thompson DPM;  Location: [facility];  Service:    • CARDIAC DEFIBRILLATOR PLACEMENT  2010, 2016   • CARPAL TUNNEL RELEASE  2015    elbow and wrist left arm   • FOOT IRRIGATION, DEBRIDEMENT AND REPAIR Left 3/7/2018    Procedure: FOOT IRRIGATION, DEBRIDEMENT AND REPAIR;  Surgeon: Marco A Thompson DPM;  Location: [facility];  Service:    • FOOT IRRIGATION, DEBRIDEMENT AND REPAIR Left 3/10/2018    Procedure: FOOT IRRIGATION, DEBRIDEMENT AND REPAIR;  Surgeon: Marco A Thompson DPM;  Location: [facility] OR;  Service: Podiatry   • FOOT WOUND CLOSURE Right 3/2/2017    Procedure: INCISION AND DRAINAGE, RIGHT FOOT;  Surgeon: Tung Rosenthal DPM;  Location: [facility];  Service:    • HAMMER TOE REPAIR Left 2/15/2018    Procedure: HAMMERTOE CORRECTION LEFT SECOND, THIRD AND FOURTH TOES AND HALLUX INTERPHALANGEAL JOINT ARTHRODESIS LEFT FOOT (Micro Asnis, 4.0 & 5.0 Asnis)      (c-arm);  Surgeon: Marco A Thompson DPM;  Location: [facility];  Service:    • HARDWARE REMOVAL Left 3/5/2018     Procedure: ANKLE/FOOT HARDWARE REMOVAL;  Surgeon: Marco A Thompson DPM;  Location: [facility] OR;  Service:    • INCISION AND DRAINAGE LEG Left 3/5/2018    Procedure: INCISION AND DRAINAGE LOWER EXTREMITY;  Surgeon: Marco A Thompson DPM;  Location: [facility] OR;  Service:    • PLANTAR FASCIA RELEASE Left 11/21/2019    Procedure: PLANTAR PLANING LEFT FOOT AND ALL OTHER INDICATED PROCEDURES;  Surgeon: Marco A Thompson DPM;  Location: [facility];  Service: Podiatry   • TOE AMPUTATION Right 2016   • TONSILECTOMY, ADENOIDECTOMY, BILATERAL MYRINGOTOMY AND TUBES      age 25         Family History   Problem Relation Age of Onset   • Diabetes Father    • Stroke Father    • No Known Problems Maternal Grandmother    • No Known Problems Maternal Grandfather    • No Known Problems Paternal Grandmother    • No Known Problems Paternal Grandfather    • No Known Problems Daughter    • No Known Problems Daughter    • No Known Problems Son    • Heart disease Other    • Hypertension Other        Allergies   Allergen Reactions   • Penicillins Anaphylaxis   • Januvia [Sitagliptin] Hives   • Lipitor [Atorvastatin] Other (See Comments)     Muscle Cramps...   • Shellfish Allergy Other (See Comments)   • Peanut Oil Rash   • Peanut-Containing Drug Products Rash   • Sulfa Antibiotics Rash       Social History     Socioeconomic History   • Marital status:    Tobacco Use   • Smoking status: Never   • Smokeless tobacco: Never   Vaping Use   • Vaping Use: Never used   Substance and Sexual Activity   • Alcohol use: Yes     Comment: social   • Drug use: No   • Sexual activity: Defer         Current Outpatient Medications   Medication Sig Dispense Refill   • ascorbic acid (VITAMIN C) 1000 MG tablet Take 1 tablet by mouth Daily.     • Bacillus Coagulans-Inulin (Probiotic) 1-250 BILLION-MG capsule Take 1 capsule by mouth 2 (Two) Times a Day. 30 capsule 1   • Blood Glucose Monitoring Suppl (ONE TOUCH ULTRA 2) w/Device kit      • Cholecalciferol 125 MCG  "(5000 UT) tablet Take 1 tablet by mouth Daily.     • clindamycin (CLEOCIN) 300 MG capsule Take 1 capsule by mouth 3 (Three) Times a Day. 30 capsule 0   • dronedarone (MULTAQ) 400 MG tablet Take 1 tablet by mouth Every Night.     • fexofenadine (ALLEGRA) 180 MG tablet Take 1 tablet by mouth Daily.     • fluticasone (FLONASE) 50 MCG/ACT nasal spray 2 sprays into the nostril(s) as directed by provider As Needed for Rhinitis.     • glimepiride (AMARYL) 4 MG tablet Take 1 tablet by mouth.     • losartan (COZAAR) 25 MG tablet Take 1 tablet by mouth Daily.     • magnesium oxide (MAG-OX) 400 MG tablet      • metFORMIN (GLUCOPHAGE) 1000 MG tablet Take 1 tablet by mouth 2 (Two) Times a Day With Meals.     • metoprolol succinate XL (TOPROL-XL) 25 MG 24 hr tablet Take 1 tablet by mouth Every Night. 0.5 tablet     • Multiple Vitamins-Minerals (ZINC PO) Take  by mouth.     • multivitamin (THERAGRAN) tablet tablet Take  by mouth Daily.     • ONE TOUCH ULTRA TEST test strip USE TO TEST BLOOD SUGAR THREE TIMES A DAY  1   • HYDROcodone-acetaminophen (NORCO) 7.5-325 MG per tablet Take 1 tablet by mouth Every 6 (Six) Hours As Needed for Moderate Pain. 12 tablet 0   • vitamin C (ASCORBIC ACID) 500 MG tablet Take 500 mg by mouth Every Night.       No current facility-administered medications for this visit.       Review of Systems   Constitutional: Negative.    HENT: Negative.    Eyes: Negative.    Respiratory: Negative.    Cardiovascular: Negative.    Gastrointestinal: Negative.    Endocrine: Negative.    Genitourinary: Negative.    Musculoskeletal:        Foot pain    Skin: Negative.    Allergic/Immunologic: Negative.    Neurological: Negative.    Hematological: Negative.    Psychiatric/Behavioral: Negative.          OBJECTIVE    /90   Pulse 89   Ht 193 cm (76\")   Wt (!) 164 kg (362 lb)   SpO2 96%   BMI 44.06 kg/m²     Physical Exam  Vitals reviewed.   Constitutional:       Appearance: Normal appearance. He is " well-developed.   HENT:      Head: Normocephalic and atraumatic.   Neck:      Trachea: Trachea and phonation normal.   Cardiovascular:      Pulses:           Dorsalis pedis pulses are detected w/ Doppler on the left side.        Posterior tibial pulses are detected w/ Doppler on the left side.   Pulmonary:      Effort: Pulmonary effort is normal. No respiratory distress.   Abdominal:      General: There is no distension.      Palpations: Abdomen is soft.   Musculoskeletal:        Feet:    Feet:      Right foot:      Toenail Condition: Right toenails are abnormally thick.      Left foot:      Skin integrity: Ulcer present.      Toenail Condition: Left toenails are abnormally thick.   Skin:     General: Skin is warm and dry.   Neurological:      Mental Status: He is alert and oriented to person, place, and time.      GCS: GCS eye subscore is 4. GCS verbal subscore is 5. GCS motor subscore is 6.   Psychiatric:         Speech: Speech normal.         Behavior: Behavior normal. Behavior is cooperative.         Thought Content: Thought content normal.         Judgment: Judgment normal.          XR Ankle 3+ View Left    Result Date: 3/10/2023  Narrative: PROCEDURE: XR ANKLE 3+ VW LEFT VIEWS: 3 INDICATION: Pain, open wound, cellulitis COMPARISON: 1/4/2023 FINDINGS:   - fracture: Fragmentation and collapse of the talus. No new osseous abnormalities. Continued fragmentation of the midfoot with degenerative changes and joint space narrowing consistent with Charcot joints. Lucency posterior to the calcaneus may reflect reported soft tissue defect.   - alignment: Within normal limits   - misc: Age-related degenerative changes. Soft tissue calcifications or ossifications posterior to the distal tibia and fibula are present, as before. Calcaneal enthesophytes and calcifications in the plantar soft tissues. Slightly increased soft tissue swelling since the previous study. Vascular calcification is noted     Impression: Osseous  fragmentation and malalignment of the midfoot, unchanged from the previous study, and consistent with reported Charcot joint changes. No acute osseous abnormality, but there is increased soft tissue swelling overlying the superior aspect of the foot. There may be a soft tissue defect posterior to the calcaneus. Clinical correlation needed. Note:  if pain or symptoms persist beyond reasonable expectations and follow-up imaging is anticipated,  cross sectional imaging  (CT and/or MRI) is suggested, as is deemed clinically appropriate. Electronically signed by:  Ayla Degroot MD  3/10/2023 3:10 PM [facility] Workstation: [id number]    XR Foot 3+ View Left    Result Date: 3/10/2023  Narrative: PROCEDURE: XR FOOT 3+ VW LEFT VIEWS: 3 INDICATION: Pain, left ankle wound, cellulitis COMPARISON: 12/8/2021 and 1/4/2023 FINDINGS:   - fracture: Destruction of the previously fused interphalangeal joint of the first digit, with medial subluxation of the distal phalanx thought to the proximal phalanx. Degenerative change of the first metatarsophalangeal joint. Deformity of the fifth metatarsal head appears similar to prior study. Increased destruction and fragmentation involving the talus compared to the previous study - alignment: Interval change of alignment of the first digit, as above. Pes planus deformity   - misc: Severe fragmentation and intertarsal joint space narrowing. Large osteophyte anterior the tibiotalar joint. Severe joint space narrowing of the talonavicular joint. Numerous calcific/ossific densities posterior to the distal tibia. Large calcaneal enthesophytes at the insertion of the plantar aponeurosis and the Achilles tendon. Calcifications of the plantar soft tissues. Soft tissue swelling is seen superior to the metatarsals. Diffuse osteopenia. Vascular calcification. There may be a soft tissue defect posterior to the calcaneus     Impression: Changes of Charcot foot, increased from the previous study of 12/8/2021, and  not significantly changed from the ankle radiograph of 1/4/2023. Pes planus. Severe soft tissue swelling overlying the metatarsals. Electronically signed by:  Ayla Degroot MD  3/10/2023 4:04 PM [facility] Workstation: [id number]    .    Procedures         ASSESSMENT AND PLAN     Diagnoses and all orders for this visit:     1. Charcot's joint, left ankle and foot (Primary)     2. S/P transmetatarsal amputation of foot, right (HCC)     3. Type 2 diabetes mellitus with peripheral neuropathy (HCC)     4. Lower extremity edema     5. Wound of left lower extremity, initial encounter    Restarted clindamycin  Follow-up next week preoperatively  Contact office for any worsening symptoms  Reapplied TCC cast, patient neurovascular intact post procedure        This document has been electronically signed by Lito WATSON, FNP-C, ONP-C on March 21, 2023 11:29 CDT

## 2023-03-22 ENCOUNTER — PRE-ADMISSION TESTING (OUTPATIENT)
Dept: PREADMISSION TESTING | Facility: HOSPITAL | Age: 63
End: 2023-03-22
Payer: MEDICARE

## 2023-03-22 LAB
ANION GAP SERPL CALCULATED.3IONS-SCNC: 12 MMOL/L (ref 5–15)
BUN SERPL-MCNC: 23 MG/DL (ref 8–23)
BUN/CREAT SERPL: 26.7 (ref 7–25)
CALCIUM SPEC-SCNC: 9.1 MG/DL (ref 8.6–10.5)
CHLORIDE SERPL-SCNC: 101 MMOL/L (ref 98–107)
CO2 SERPL-SCNC: 25 MMOL/L (ref 22–29)
CREAT SERPL-MCNC: 0.86 MG/DL (ref 0.76–1.27)
EGFRCR SERPLBLD CKD-EPI 2021: 98.5 ML/MIN/1.73
GLUCOSE SERPL-MCNC: 115 MG/DL (ref 65–99)
POTASSIUM SERPL-SCNC: 4.4 MMOL/L (ref 3.5–5.2)
SODIUM SERPL-SCNC: 138 MMOL/L (ref 136–145)

## 2023-03-22 PROCEDURE — 80048 BASIC METABOLIC PNL TOTAL CA: CPT

## 2023-03-22 PROCEDURE — 36415 COLL VENOUS BLD VENIPUNCTURE: CPT

## 2023-03-22 RX ORDER — SODIUM CHLORIDE 9 MG/ML
1000 INJECTION, SOLUTION INTRAVENOUS CONTINUOUS
OUTPATIENT
Start: 2023-03-22

## 2023-03-29 ENCOUNTER — OFFICE VISIT (OUTPATIENT)
Dept: PODIATRY | Facility: CLINIC | Age: 63
End: 2023-03-29
Payer: MEDICARE

## 2023-03-29 VITALS — OXYGEN SATURATION: 96 % | WEIGHT: 315 LBS | HEIGHT: 76 IN | BODY MASS INDEX: 38.36 KG/M2 | HEART RATE: 82 BPM

## 2023-03-29 DIAGNOSIS — S81.802A WOUND OF LEFT LOWER EXTREMITY, INITIAL ENCOUNTER: ICD-10-CM

## 2023-03-29 DIAGNOSIS — M14.672 CHARCOT'S JOINT OF LEFT FOOT: ICD-10-CM

## 2023-03-29 DIAGNOSIS — L03.116 CELLULITIS OF LEFT FOOT: ICD-10-CM

## 2023-03-29 DIAGNOSIS — E11.621 TYPE 2 DIABETES MELLITUS WITH FOOT ULCER, UNSPECIFIED WHETHER LONG TERM INSULIN USE: Primary | ICD-10-CM

## 2023-03-29 DIAGNOSIS — L97.509 TYPE 2 DIABETES MELLITUS WITH FOOT ULCER, UNSPECIFIED WHETHER LONG TERM INSULIN USE: Primary | ICD-10-CM

## 2023-03-29 DIAGNOSIS — Z89.431 S/P TRANSMETATARSAL AMPUTATION OF FOOT, RIGHT: ICD-10-CM

## 2023-03-29 PROCEDURE — 99213 OFFICE O/P EST LOW 20 MIN: CPT | Performed by: NURSE PRACTITIONER

## 2023-03-29 PROCEDURE — 29445 APPL RIGID TOT CNTC LEG CAST: CPT | Performed by: NURSE PRACTITIONER

## 2023-03-29 RX ORDER — CLINDAMYCIN HYDROCHLORIDE 150 MG/1
150 CAPSULE ORAL 3 TIMES DAILY
Qty: 30 CAPSULE | Refills: 0 | Status: SHIPPED | OUTPATIENT
Start: 2023-03-29 | End: 2023-04-05 | Stop reason: SDUPTHER

## 2023-03-29 NOTE — PROGRESS NOTES
Emre Lisa  1960  63 y.o. male   PCP:Rasheed Bravo 7/19/2022   BS: 127 per patient     3/29/2023        Chief Complaint   Patient presents with   • Left Ankle - Wound Check       History of Present Illness    Emre Lisa is a 63 y.o.male patient came to clinic for left foot wound. Patient was placed in a TCC            Past Medical History:   Diagnosis Date   • Arthritis    • Atrial fibrillation (HCC)    • Diabetes mellitus (HCC)    • Diabetic foot ulcer associated with type 2 diabetes mellitus (HCC)     left great toe   • Hallux malleus of left foot    • Hammer toe    • Hypertension    • MI (myocardial infarction) (HCC)    • Neuropathy in diabetes (HCC)    • Onychomycosis    • Presence of biventricular cardiac pacemaker    • Rheumatoid arthritis (HCC)          Past Surgical History:   Procedure Laterality Date   • AMPUTATION DIGIT Right 3/5/2017    Procedure: RIGHT AMPUTATION TRANSMETATRASAL , RECESSION GASTROCNEMOUS;  Surgeon: Marco A Thompson DPM;  Location: [facility];  Service:    • CARDIAC DEFIBRILLATOR PLACEMENT  2010, 2016   • CARPAL TUNNEL RELEASE  2015    elbow and wrist left arm   • FOOT IRRIGATION, DEBRIDEMENT AND REPAIR Left 3/7/2018    Procedure: FOOT IRRIGATION, DEBRIDEMENT AND REPAIR;  Surgeon: Marco A Thompson DPM;  Location: [facility] OR;  Service:    • FOOT IRRIGATION, DEBRIDEMENT AND REPAIR Left 3/10/2018    Procedure: FOOT IRRIGATION, DEBRIDEMENT AND REPAIR;  Surgeon: Marco A Thompson DPM;  Location: [facility] OR;  Service: Podiatry   • FOOT WOUND CLOSURE Right 3/2/2017    Procedure: INCISION AND DRAINAGE, RIGHT FOOT;  Surgeon: Tung Rosenthal DPM;  Location: [facility];  Service:    • HAMMER TOE REPAIR Left 2/15/2018    Procedure: HAMMERTOE CORRECTION LEFT SECOND, THIRD AND FOURTH TOES AND HALLUX INTERPHALANGEAL JOINT ARTHRODESIS LEFT FOOT (Micro Asnis, 4.0 & 5.0 Asnis)      (c-arm);  Surgeon: Marco A Thompson DPM;  Location: [facility];  Service:    • HARDWARE REMOVAL Left 3/5/2018     Procedure: ANKLE/FOOT HARDWARE REMOVAL;  Surgeon: Marco A Thompson DPM;  Location: [facility];  Service:    • INCISION AND DRAINAGE LEG Left 3/5/2018    Procedure: INCISION AND DRAINAGE LOWER EXTREMITY;  Surgeon: Marco A Thompson DPM;  [facility];  Service:    • PLANTAR FASCIA RELEASE Left 11/21/2019    Procedure: PLANTAR PLANING LEFT FOOT AND ALL OTHER INDICATED PROCEDURES;  Surgeon: Marco A Thompson DPM;  Location: [facility];  Service: Podiatry   • TOE AMPUTATION Right 2016   • TONSILECTOMY, ADENOIDECTOMY, BILATERAL MYRINGOTOMY AND TUBES      age 25         Family History   Problem Relation Age of Onset   • Diabetes Father    • Stroke Father    • No Known Problems Maternal Grandmother    • No Known Problems Maternal Grandfather    • No Known Problems Paternal Grandmother    • No Known Problems Paternal Grandfather    • No Known Problems Daughter    • No Known Problems Daughter    • No Known Problems Son    • Heart disease Other    • Hypertension Other        Allergies   Allergen Reactions   • Penicillins Anaphylaxis   • Januvia [Sitagliptin] Hives   • Lipitor [Atorvastatin] Other (See Comments)     Muscle Cramps...   • Shellfish Allergy Other (See Comments)   • Peanut Oil Rash   • Peanut-Containing Drug Products Rash   • Sulfa Antibiotics Rash       Social History     Socioeconomic History   • Marital status:    Tobacco Use   • Smoking status: Never   • Smokeless tobacco: Never   Vaping Use   • Vaping Use: Never used   Substance and Sexual Activity   • Alcohol use: Yes     Comment: social   • Drug use: No   • Sexual activity: Defer         Current Outpatient Medications   Medication Sig Dispense Refill   • ascorbic acid (VITAMIN C) 1000 MG tablet Take 1 tablet by mouth Every Night.     • Bacillus Coagulans-Inulin (Probiotic) 1-250 BILLION-MG capsule Take 1 capsule by mouth 2 (Two) Times a Day. 30 capsule 1   • Blood Glucose Monitoring Suppl (ONE TOUCH ULTRA 2) w/Device kit      • Cholecalciferol 125  "MCG (5000 UT) tablet Take 1 tablet by mouth Every Night.     • clindamycin (CLEOCIN) 300 MG capsule Take 1 capsule by mouth 3 (Three) Times a Day. 30 capsule 0   • dronedarone (MULTAQ) 400 MG tablet Take 1 tablet by mouth Every Night.     • fexofenadine (ALLEGRA) 180 MG tablet Take 1 tablet by mouth Every Night.     • fluticasone (FLONASE) 50 MCG/ACT nasal spray 2 sprays into the nostril(s) as directed by provider As Needed for Rhinitis.     • glimepiride (AMARYL) 4 MG tablet Take 1 tablet by mouth Every Night.     • losartan (COZAAR) 25 MG tablet Take 1 tablet by mouth Daily.     • magnesium oxide (MAG-OX) 400 MG tablet Take 1 tablet by mouth 2 (Two) Times a Day.     • metFORMIN (GLUCOPHAGE) 1000 MG tablet Take 1 tablet by mouth 2 (Two) Times a Day With Meals.     • metoprolol succinate XL (TOPROL-XL) 25 MG 24 hr tablet Take 1 tablet by mouth 2 (Two) Times a Day. 0.5 tablet     • Multiple Vitamins-Minerals (ZINC PO) Take 1 tablet by mouth Every Night.     • multivitamin (THERAGRAN) tablet tablet Take  by mouth Every Night.     • ONE TOUCH ULTRA TEST test strip USE TO TEST BLOOD SUGAR THREE TIMES A DAY  1     No current facility-administered medications for this visit.       Review of Systems   Constitutional: Negative.    HENT: Negative.    Eyes: Negative.    Respiratory: Negative.    Cardiovascular: Negative.    Gastrointestinal: Negative.    Endocrine: Negative.    Genitourinary: Negative.    Musculoskeletal:        Foot pain    Skin: Negative.    Allergic/Immunologic: Negative.    Neurological: Negative.    Hematological: Negative.    Psychiatric/Behavioral: Negative.          OBJECTIVE    Pulse 82   Ht 193 cm (76\")   Wt (!) 164 kg (362 lb)   SpO2 96%   BMI 44.06 kg/m²     Physical Exam  Vitals reviewed.   Constitutional:       Appearance: Normal appearance. He is well-developed.   HENT:      Head: Normocephalic and atraumatic.   Neck:      Trachea: Trachea and phonation normal.   Cardiovascular:      " Pulses:           Dorsalis pedis pulses are detected w/ Doppler on the left side.        Posterior tibial pulses are detected w/ Doppler on the left side.   Pulmonary:      Effort: Pulmonary effort is normal. No respiratory distress.   Abdominal:      General: There is no distension.      Palpations: Abdomen is soft.   Musculoskeletal:        Feet:    Feet:      Right foot:      Toenail Condition: Right toenails are abnormally thick.      Left foot:      Skin integrity: Ulcer present.      Toenail Condition: Left toenails are abnormally thick.   Skin:     General: Skin is warm and dry.   Neurological:      Mental Status: He is alert and oriented to person, place, and time.      GCS: GCS eye subscore is 4. GCS verbal subscore is 5. GCS motor subscore is 6.   Psychiatric:         Speech: Speech normal.         Behavior: Behavior normal. Behavior is cooperative.         Thought Content: Thought content normal.         Judgment: Judgment normal.          XR Ankle 3+ View Left    Result Date: 3/10/2023  Narrative: PROCEDURE: XR ANKLE 3+ VW LEFT VIEWS: 3 INDICATION: Pain, open wound, cellulitis COMPARISON: 1/4/2023 FINDINGS:   - fracture: Fragmentation and collapse of the talus. No new osseous abnormalities. Continued fragmentation of the midfoot with degenerative changes and joint space narrowing consistent with Charcot joints. Lucency posterior to the calcaneus may reflect reported soft tissue defect.   - alignment: Within normal limits   - misc: Age-related degenerative changes. Soft tissue calcifications or ossifications posterior to the distal tibia and fibula are present, as before. Calcaneal enthesophytes and calcifications in the plantar soft tissues. Slightly increased soft tissue swelling since the previous study. Vascular calcification is noted     Impression: Osseous fragmentation and malalignment of the midfoot, unchanged from the previous study, and consistent with reported Charcot joint changes. No acute  osseous abnormality, but there is increased soft tissue swelling overlying the superior aspect of the foot. There may be a soft tissue defect posterior to the calcaneus. Clinical correlation needed. Note:  if pain or symptoms persist beyond reasonable expectations and follow-up imaging is anticipated,  cross sectional imaging  (CT and/or MRI) is suggested, as is deemed clinically appropriate. Electronically signed by:  Ayla Degroot MD  3/10/2023 3:10 PM [facility] Workstation: [id number]    XR Foot 3+ View Left    Result Date: 3/10/2023  Narrative: PROCEDURE: XR FOOT 3+ VW LEFT VIEWS: 3 INDICATION: Pain, left ankle wound, cellulitis COMPARISON: 12/8/2021 and 1/4/2023 FINDINGS:   - fracture: Destruction of the previously fused interphalangeal joint of the first digit, with medial subluxation of the distal phalanx thought to the proximal phalanx. Degenerative change of the first metatarsophalangeal joint. Deformity of the fifth metatarsal head appears similar to prior study. Increased destruction and fragmentation involving the talus compared to the previous study - alignment: Interval change of alignment of the first digit, as above. Pes planus deformity   - misc: Severe fragmentation and intertarsal joint space narrowing. Large osteophyte anterior the tibiotalar joint. Severe joint space narrowing of the talonavicular joint. Numerous calcific/ossific densities posterior to the distal tibia. Large calcaneal enthesophytes at the insertion of the plantar aponeurosis and the Achilles tendon. Calcifications of the plantar soft tissues. Soft tissue swelling is seen superior to the metatarsals. Diffuse osteopenia. Vascular calcification. There may be a soft tissue defect posterior to the calcaneus     Impression: Changes of Charcot foot, increased from the previous study of 12/8/2021, and not significantly changed from the ankle radiograph of 1/4/2023. Pes planus. Severe soft tissue swelling overlying the metatarsals.  Electronically signed by:  Ayla Degroot MD  3/10/2023 4:04 PM [facility] Workstation: [id number]    .    Procedures         ASSESSMENT AND PLAN     Diagnoses and all orders for this visit:     1. Charcot's joint, left ankle and foot (Primary)     2. S/P transmetatarsal amputation of foot, right (HCC)     3. Type 2 diabetes mellitus with peripheral neuropathy (HCC)     4. Lower extremity edema     5. Wound of left lower extremity, initial encounter    continue clindamycin  Follow-up next week preoperatively  Contact office for any worsening symptoms  Reapplied TCC cast, patient neurovascular intact post procedure        This document has been electronically signed by Lito WATSON, FNP-C, ONP-C on March 29, 2023 13:15 CDT

## 2023-04-05 ENCOUNTER — OFFICE VISIT (OUTPATIENT)
Dept: PODIATRY | Facility: CLINIC | Age: 63
End: 2023-04-05
Payer: MEDICARE

## 2023-04-05 VITALS — HEIGHT: 76 IN | BODY MASS INDEX: 38.36 KG/M2 | WEIGHT: 315 LBS | OXYGEN SATURATION: 99 %

## 2023-04-05 DIAGNOSIS — M14.672 CHARCOT'S JOINT OF LEFT FOOT: ICD-10-CM

## 2023-04-05 DIAGNOSIS — L03.116 CELLULITIS OF LEFT FOOT: ICD-10-CM

## 2023-04-05 DIAGNOSIS — M86.372 CHRONIC MULTIFOCAL OSTEOMYELITIS OF LEFT FOOT: ICD-10-CM

## 2023-04-05 PROCEDURE — 99212 OFFICE O/P EST SF 10 MIN: CPT | Performed by: NURSE PRACTITIONER

## 2023-04-05 PROCEDURE — 29445 APPL RIGID TOT CNTC LEG CAST: CPT | Performed by: NURSE PRACTITIONER

## 2023-04-05 RX ORDER — CLINDAMYCIN HYDROCHLORIDE 300 MG/1
300 CAPSULE ORAL 3 TIMES DAILY
Qty: 30 CAPSULE | Refills: 0 | Status: ON HOLD | OUTPATIENT
Start: 2023-04-05

## 2023-04-05 RX ORDER — CLINDAMYCIN HYDROCHLORIDE 150 MG/1
300 CAPSULE ORAL 3 TIMES DAILY
Qty: 30 CAPSULE | Refills: 0 | Status: SHIPPED | OUTPATIENT
Start: 2023-04-05 | End: 2023-04-05 | Stop reason: SDUPTHER

## 2023-04-05 NOTE — PROGRESS NOTES
Emre Lisa  1960  63 y.o. male   PCP:Rasheed Bravo 7/19/2022   BS: 115 per patient         Chief Complaint   Patient presents with   • Left Foot - Pain, Wound Check, Follow-up       History of Present Illness    Emre Lisa is a 63 y.o.male patient came to clinic for left foot wound. Patient was placed in a TCC            Past Medical History:   Diagnosis Date   • Arthritis    • Atrial fibrillation    • Diabetes mellitus    • Diabetic foot ulcer associated with type 2 diabetes mellitus     left great toe   • Hallux malleus of left foot    • Hammer toe    • Hypertension    • MI (myocardial infarction)    • Neuropathy in diabetes    • Onychomycosis    • Presence of biventricular cardiac pacemaker    • Rheumatoid arthritis          Past Surgical History:   Procedure Laterality Date   • AMPUTATION DIGIT Right 3/5/2017    Procedure: RIGHT AMPUTATION TRANSMETATRASAL , RECESSION GASTROCNEMOUS;  Surgeon: Marco A Thompson DPM;  Location: [facility] OR;  Service:    • CARDIAC DEFIBRILLATOR PLACEMENT  2010, 2016   • CARPAL TUNNEL RELEASE  2015    elbow and wrist left arm   • FOOT IRRIGATION, DEBRIDEMENT AND REPAIR Left 3/7/2018    Procedure: FOOT IRRIGATION, DEBRIDEMENT AND REPAIR;  Surgeon: Marco A Thompson DPM;  Location: [facility] OR;  Service:    • FOOT IRRIGATION, DEBRIDEMENT AND REPAIR Left 3/10/2018    Procedure: FOOT IRRIGATION, DEBRIDEMENT AND REPAIR;  Surgeon: Marco A Thompson DPM;  Location: [facility] OR;  Service: Podiatry   • FOOT WOUND CLOSURE Right 3/2/2017    Procedure: INCISION AND DRAINAGE, RIGHT FOOT;  Surgeon: Tung Rosenthal DPM;  Location: [facility] OR;  Service:    • HAMMER TOE REPAIR Left 2/15/2018    Procedure: HAMMERTOE CORRECTION LEFT SECOND, THIRD AND FOURTH TOES AND HALLUX INTERPHALANGEAL JOINT ARTHRODESIS LEFT FOOT (Micro Asnis, 4.0 & 5.0 Asnis)      (c-arm);  Surgeon: Marco A Thompson DPM;  Location: [facility] OR;  Service:    • HARDWARE REMOVAL Left 3/5/2018    Procedure: ANKLE/FOOT HARDWARE  REMOVAL;  Surgeon: Marco A Thompson DPM;  Location: [facility] OR;  Service:    • INCISION AND DRAINAGE LEG Left 3/5/2018    Procedure: INCISION AND DRAINAGE LOWER EXTREMITY;  Surgeon: Marco A Thompson DPM;  Location: [facility] OR;  Service:    • PLANTAR FASCIA RELEASE Left 11/21/2019    Procedure: PLANTAR PLANING LEFT FOOT AND ALL OTHER INDICATED PROCEDURES;  Surgeon: Marco A Thompson DPM;  Location: [facility];  Service: Podiatry   • TOE AMPUTATION Right 2016   • TONSILECTOMY, ADENOIDECTOMY, BILATERAL MYRINGOTOMY AND TUBES      age 25         Family History   Problem Relation Age of Onset   • Diabetes Father    • Stroke Father    • No Known Problems Maternal Grandmother    • No Known Problems Maternal Grandfather    • No Known Problems Paternal Grandmother    • No Known Problems Paternal Grandfather    • No Known Problems Daughter    • No Known Problems Daughter    • No Known Problems Son    • Heart disease Other    • Hypertension Other        Allergies   Allergen Reactions   • Penicillins Anaphylaxis   • Januvia [Sitagliptin] Hives   • Lipitor [Atorvastatin] Other (See Comments)     Muscle Cramps...   • Shellfish Allergy Other (See Comments)   • Peanut Oil Rash   • Peanut-Containing Drug Products Rash   • Sulfa Antibiotics Rash       Social History     Socioeconomic History   • Marital status:    Tobacco Use   • Smoking status: Never   • Smokeless tobacco: Never   Vaping Use   • Vaping Use: Never used   Substance and Sexual Activity   • Alcohol use: Yes     Comment: social   • Drug use: No   • Sexual activity: Defer         Current Outpatient Medications   Medication Sig Dispense Refill   • ascorbic acid (VITAMIN C) 1000 MG tablet Take 1 tablet by mouth Every Night.     • Bacillus Coagulans-Inulin (Probiotic) 1-250 BILLION-MG capsule Take 1 capsule by mouth 2 (Two) Times a Day. 30 capsule 1   • Blood Glucose Monitoring Suppl (ONE TOUCH ULTRA 2) w/Device kit      • Cholecalciferol 125 MCG (5000 UT) tablet Take 1  "tablet by mouth Every Night.     • clindamycin (CLEOCIN) 300 MG capsule Take 1 capsule by mouth 3 (Three) Times a Day. 30 capsule 0   • dronedarone (MULTAQ) 400 MG tablet Take 1 tablet by mouth Every Night.     • fexofenadine (ALLEGRA) 180 MG tablet Take 1 tablet by mouth Every Night.     • fluticasone (FLONASE) 50 MCG/ACT nasal spray 2 sprays into the nostril(s) as directed by provider As Needed for Rhinitis.     • glimepiride (AMARYL) 4 MG tablet Take 1 tablet by mouth Every Night.     • losartan (COZAAR) 25 MG tablet Take 1 tablet by mouth Daily.     • magnesium oxide (MAG-OX) 400 MG tablet Take 1 tablet by mouth 2 (Two) Times a Day.     • metFORMIN (GLUCOPHAGE) 1000 MG tablet Take 1 tablet by mouth 2 (Two) Times a Day With Meals.     • metoprolol succinate XL (TOPROL-XL) 25 MG 24 hr tablet Take 1 tablet by mouth 2 (Two) Times a Day. 0.5 tablet     • Multiple Vitamins-Minerals (ZINC PO) Take 1 tablet by mouth Every Night.     • multivitamin (THERAGRAN) tablet tablet Take  by mouth Every Night.     • ONE TOUCH ULTRA TEST test strip USE TO TEST BLOOD SUGAR THREE TIMES A DAY  1     No current facility-administered medications for this visit.       Review of Systems   Constitutional: Negative.    HENT: Negative.    Eyes: Negative.    Respiratory: Negative.    Cardiovascular: Negative.    Gastrointestinal: Negative.    Endocrine: Negative.    Genitourinary: Negative.    Musculoskeletal:        Foot pain    Skin: Negative.    Allergic/Immunologic: Negative.    Neurological: Negative.    Hematological: Negative.    Psychiatric/Behavioral: Negative.          OBJECTIVE    Ht 193 cm (76\")   Wt (!) 164 kg (362 lb)   SpO2 99%   BMI 44.06 kg/m²     Physical Exam  Vitals reviewed.   Constitutional:       Appearance: Normal appearance. He is well-developed.   HENT:      Head: Normocephalic and atraumatic.   Neck:      Trachea: Trachea and phonation normal.   Cardiovascular:      Pulses:           Dorsalis pedis pulses are " detected w/ Doppler on the left side.        Posterior tibial pulses are detected w/ Doppler on the left side.   Pulmonary:      Effort: Pulmonary effort is normal. No respiratory distress.   Abdominal:      General: There is no distension.      Palpations: Abdomen is soft.   Musculoskeletal:        Feet:    Feet:      Right foot:      Toenail Condition: Right toenails are abnormally thick.      Left foot:      Skin integrity: Ulcer present.      Toenail Condition: Left toenails are abnormally thick.   Skin:     General: Skin is warm and dry.   Neurological:      Mental Status: He is alert and oriented to person, place, and time.      GCS: GCS eye subscore is 4. GCS verbal subscore is 5. GCS motor subscore is 6.   Psychiatric:         Speech: Speech normal.         Behavior: Behavior normal. Behavior is cooperative.         Thought Content: Thought content normal.         Judgment: Judgment normal.            .    Procedures         ASSESSMENT AND PLAN     Diagnoses and all orders for this visit:     1. Charcot's joint, left ankle and foot (Primary)     2. S/P transmetatarsal amputation of foot, right (HCC)     3. Type 2 diabetes mellitus with peripheral neuropathy (HCC)     4. Lower extremity edema     5. Wound of left lower extremity, initial encounter    continue clindamycin  Follow-up next week preoperatively  Contact office for any worsening symptoms  Reapplied TCC cast, patient neurovascular intact post procedure        This document has been electronically signed by Lito WATSON, FNP-C, ONP-C on April 5, 2023 09:13 CDT

## 2023-04-11 ENCOUNTER — OFFICE VISIT (OUTPATIENT)
Dept: PODIATRY | Facility: CLINIC | Age: 63
End: 2023-04-11
Payer: MEDICARE

## 2023-04-11 VITALS
HEART RATE: 100 BPM | DIASTOLIC BLOOD PRESSURE: 88 MMHG | SYSTOLIC BLOOD PRESSURE: 150 MMHG | BODY MASS INDEX: 38.36 KG/M2 | WEIGHT: 315 LBS | HEIGHT: 76 IN | OXYGEN SATURATION: 96 %

## 2023-04-11 DIAGNOSIS — S81.802A WOUND OF LEFT LOWER EXTREMITY, INITIAL ENCOUNTER: ICD-10-CM

## 2023-04-11 DIAGNOSIS — E11.621 TYPE 2 DIABETES MELLITUS WITH FOOT ULCER, UNSPECIFIED WHETHER LONG TERM INSULIN USE: ICD-10-CM

## 2023-04-11 DIAGNOSIS — L97.521 SKIN ULCER OF LEFT FOOT, LIMITED TO BREAKDOWN OF SKIN: ICD-10-CM

## 2023-04-11 DIAGNOSIS — L97.509 TYPE 2 DIABETES MELLITUS WITH FOOT ULCER, UNSPECIFIED WHETHER LONG TERM INSULIN USE: ICD-10-CM

## 2023-04-11 DIAGNOSIS — M86.372 CHRONIC MULTIFOCAL OSTEOMYELITIS OF LEFT FOOT: ICD-10-CM

## 2023-04-11 DIAGNOSIS — M14.672 CHARCOT'S JOINT OF LEFT FOOT: Primary | ICD-10-CM

## 2023-04-11 PROCEDURE — 29580 STRAPPING UNNA BOOT: CPT | Performed by: NURSE PRACTITIONER

## 2023-04-11 PROCEDURE — 99213 OFFICE O/P EST LOW 20 MIN: CPT | Performed by: NURSE PRACTITIONER

## 2023-04-11 RX ORDER — FUROSEMIDE 20 MG/1
20 TABLET ORAL DAILY
Qty: 10 TABLET | Refills: 0 | Status: ON HOLD | OUTPATIENT
Start: 2023-04-11

## 2023-04-11 NOTE — PROGRESS NOTES
Emre Lisa  1960  63 y.o. male   PCP:Rasheed Bravo 7/19/2022   BS: 124 per patient     4/11/2023    Chief Complaint   Patient presents with   • Left Foot - Skin Ulcer       History of Present Illness    Emre Lisa is a 63 y.o.male patient came to clinic for left foot wound. Patient was placed in a TCC            Past Medical History:   Diagnosis Date   • Arthritis    • Atrial fibrillation    • Diabetes mellitus    • Diabetic foot ulcer associated with type 2 diabetes mellitus     left great toe   • Hallux malleus of left foot    • Hammer toe    • Hypertension    • MI (myocardial infarction)    • Neuropathy in diabetes    • Onychomycosis    • Presence of biventricular cardiac pacemaker    • Rheumatoid arthritis          Past Surgical History:   Procedure Laterality Date   • AMPUTATION DIGIT Right 3/5/2017    Procedure: RIGHT AMPUTATION TRANSMETATRASAL , RECESSION GASTROCNEMOUS;  Surgeon: Marco A Thompson DPM;  Location: [facility] OR;  Service:    • CARDIAC DEFIBRILLATOR PLACEMENT  2010, 2016   • CARPAL TUNNEL RELEASE  2015    elbow and wrist left arm   • FOOT IRRIGATION, DEBRIDEMENT AND REPAIR Left 3/7/2018    Procedure: FOOT IRRIGATION, DEBRIDEMENT AND REPAIR;  Surgeon: Marco A Thompson DPM;  Location: [facility] OR;  Service:    • FOOT IRRIGATION, DEBRIDEMENT AND REPAIR Left 3/10/2018    Procedure: FOOT IRRIGATION, DEBRIDEMENT AND REPAIR;  Surgeon: Marco A Thompson DPM;  Location: [facility] OR;  Service: Podiatry   • FOOT WOUND CLOSURE Right 3/2/2017    Procedure: INCISION AND DRAINAGE, RIGHT FOOT;  Surgeon: Tung Rosenthal DPM;  Location: [facility] OR;  Service:    • HAMMER TOE REPAIR Left 2/15/2018    Procedure: HAMMERTOE CORRECTION LEFT SECOND, THIRD AND FOURTH TOES AND HALLUX INTERPHALANGEAL JOINT ARTHRODESIS LEFT FOOT (Micro Asnis, 4.0 & 5.0 Asnis)      (c-arm);  Surgeon: Marco A Thompson DPM;  Location: [facility] OR;  Service:    • HARDWARE REMOVAL Left 3/5/2018    Procedure: ANKLE/FOOT HARDWARE REMOVAL;   Surgeon: Marco A Thompson DPM;  Location: [facility];  Service:    • INCISION AND DRAINAGE LEG Left 3/5/2018    Procedure: INCISION AND DRAINAGE LOWER EXTREMITY;  Surgeon: Marco A Thompson DPM;  Location: [facility] OR;  Service:    • PLANTAR FASCIA RELEASE Left 11/21/2019    Procedure: PLANTAR PLANING LEFT FOOT AND ALL OTHER INDICATED PROCEDURES;  Surgeon: Marco A Thompson DPM;  Location: [facility];  Service: Podiatry   • TOE AMPUTATION Right 2016   • TONSILECTOMY, ADENOIDECTOMY, BILATERAL MYRINGOTOMY AND TUBES      age 25         Family History   Problem Relation Age of Onset   • Diabetes Father    • Stroke Father    • No Known Problems Maternal Grandmother    • No Known Problems Maternal Grandfather    • No Known Problems Paternal Grandmother    • No Known Problems Paternal Grandfather    • No Known Problems Daughter    • No Known Problems Daughter    • No Known Problems Son    • Heart disease Other    • Hypertension Other        Allergies   Allergen Reactions   • Penicillins Anaphylaxis   • Januvia [Sitagliptin] Hives   • Lipitor [Atorvastatin] Other (See Comments)     Muscle Cramps...   • Shellfish Allergy Other (See Comments)   • Peanut Oil Rash   • Peanut-Containing Drug Products Rash   • Sulfa Antibiotics Rash       Social History     Socioeconomic History   • Marital status:    Tobacco Use   • Smoking status: Never   • Smokeless tobacco: Never   Vaping Use   • Vaping Use: Never used   Substance and Sexual Activity   • Alcohol use: Yes     Comment: social   • Drug use: No   • Sexual activity: Defer         Current Outpatient Medications   Medication Sig Dispense Refill   • ascorbic acid (VITAMIN C) 1000 MG tablet Take 1 tablet by mouth Every Night.     • Bacillus Coagulans-Inulin (Probiotic) 1-250 BILLION-MG capsule Take 1 capsule by mouth 2 (Two) Times a Day. 30 capsule 1   • Blood Glucose Monitoring Suppl (ONE TOUCH ULTRA 2) w/Device kit      • Cholecalciferol 125 MCG (5000 UT) tablet Take 1 tablet by  "mouth Every Night.     • clindamycin (CLEOCIN) 300 MG capsule Take 1 capsule by mouth 3 (Three) Times a Day. 30 capsule 0   • dronedarone (MULTAQ) 400 MG tablet Take 1 tablet by mouth Every Night.     • fexofenadine (ALLEGRA) 180 MG tablet Take 1 tablet by mouth Every Night.     • fluticasone (FLONASE) 50 MCG/ACT nasal spray 2 sprays into the nostril(s) as directed by provider As Needed for Rhinitis.     • glimepiride (AMARYL) 4 MG tablet Take 1 tablet by mouth Every Night.     • losartan (COZAAR) 25 MG tablet Take 1 tablet by mouth Daily.     • magnesium oxide (MAG-OX) 400 MG tablet Take 1 tablet by mouth 2 (Two) Times a Day.     • metFORMIN (GLUCOPHAGE) 1000 MG tablet Take 1 tablet by mouth 2 (Two) Times a Day With Meals.     • metoprolol succinate XL (TOPROL-XL) 25 MG 24 hr tablet Take 1 tablet by mouth 2 (Two) Times a Day. 0.5 tablet     • Multiple Vitamins-Minerals (ZINC PO) Take 1 tablet by mouth Every Night.     • multivitamin (THERAGRAN) tablet tablet Take  by mouth Every Night.     • ONE TOUCH ULTRA TEST test strip USE TO TEST BLOOD SUGAR THREE TIMES A DAY  1   • furosemide (Lasix) 20 MG tablet Take 1 tablet by mouth Daily. 10 tablet 0     No current facility-administered medications for this visit.       Review of Systems   Constitutional: Negative.    HENT: Negative.    Eyes: Negative.    Respiratory: Negative.    Cardiovascular: Negative.    Gastrointestinal: Negative.    Endocrine: Negative.    Genitourinary: Negative.    Musculoskeletal:        Foot pain    Skin: Negative.    Allergic/Immunologic: Negative.    Neurological: Negative.    Hematological: Negative.    Psychiatric/Behavioral: Negative.          OBJECTIVE    /88   Pulse 100   Ht 193 cm (76\")   Wt (!) 164 kg (362 lb)   SpO2 96%   BMI 44.06 kg/m²     Physical Exam  Vitals reviewed.   Constitutional:       Appearance: Normal appearance. He is well-developed.   HENT:      Head: Normocephalic and atraumatic.   Neck:      Trachea: " Trachea and phonation normal.   Cardiovascular:      Pulses:           Dorsalis pedis pulses are detected w/ Doppler on the left side.        Posterior tibial pulses are detected w/ Doppler on the left side.   Pulmonary:      Effort: Pulmonary effort is normal. No respiratory distress.   Abdominal:      General: There is no distension.      Palpations: Abdomen is soft.   Musculoskeletal:        Feet:    Feet:      Right foot:      Toenail Condition: Right toenails are abnormally thick.      Left foot:      Skin integrity: Ulcer present.      Toenail Condition: Left toenails are abnormally thick.   Skin:     General: Skin is warm and dry.   Neurological:      Mental Status: He is alert and oriented to person, place, and time.      GCS: GCS eye subscore is 4. GCS verbal subscore is 5. GCS motor subscore is 6.   Psychiatric:         Speech: Speech normal.         Behavior: Behavior normal. Behavior is cooperative.         Thought Content: Thought content normal.         Judgment: Judgment normal.            .    Procedures  Unna boot was applied after wounds was painted Betadine       ASSESSMENT AND PLAN     Diagnoses and all orders for this visit:     1. Charcot's joint, left ankle and foot (Primary)     2. S/P transmetatarsal amputation of foot, right (HCC)     3. Type 2 diabetes mellitus with peripheral neuropathy (HCC)     4. Lower extremity edema     5. Wound of left lower extremity, initial encounter    New Medications Ordered This Visit   Medications   • furosemide (Lasix) 20 MG tablet     Sig: Take 1 tablet by mouth Daily.     Dispense:  10 tablet     Refill:  0         Started on a short course of Lasix  Continue strict elevation  Nonweightbearing  Follow-up on Thursday to proceed with surgery of swelling is under control  Contact office for any problems            This document has been electronically signed by Lito WATSON FNPABILIO, ONP-C on April 11, 2023 14:27 CDT

## 2023-04-13 ENCOUNTER — ANESTHESIA (OUTPATIENT)
Dept: PERIOP | Facility: HOSPITAL | Age: 63
End: 2023-04-13
Payer: MEDICARE

## 2023-04-13 ENCOUNTER — ANESTHESIA EVENT (OUTPATIENT)
Dept: PERIOP | Facility: HOSPITAL | Age: 63
End: 2023-04-13
Payer: MEDICARE

## 2023-04-13 ENCOUNTER — APPOINTMENT (OUTPATIENT)
Dept: GENERAL RADIOLOGY | Facility: HOSPITAL | Age: 63
End: 2023-04-13
Payer: MEDICARE

## 2023-04-13 ENCOUNTER — HOSPITAL ENCOUNTER (INPATIENT)
Facility: HOSPITAL | Age: 63
LOS: 5 days | Discharge: NURSING, REHAB OR LONG-TERM CARE | End: 2023-04-21
Attending: PODIATRIST | Admitting: PODIATRIST
Payer: MEDICARE

## 2023-04-13 DIAGNOSIS — L97.509 TYPE 2 DIABETES MELLITUS WITH FOOT ULCER, UNSPECIFIED WHETHER LONG TERM INSULIN USE: ICD-10-CM

## 2023-04-13 DIAGNOSIS — Z74.09 IMPAIRED FUNCTIONAL MOBILITY, BALANCE, GAIT, AND ENDURANCE: ICD-10-CM

## 2023-04-13 DIAGNOSIS — Z74.09 IMPAIRED MOBILITY AND ADLS: ICD-10-CM

## 2023-04-13 DIAGNOSIS — L03.116 CELLULITIS OF LEFT FOOT: ICD-10-CM

## 2023-04-13 DIAGNOSIS — M14.672 CHARCOT'S JOINT OF LEFT FOOT: ICD-10-CM

## 2023-04-13 DIAGNOSIS — Z78.9 IMPAIRED MOBILITY AND ADLS: ICD-10-CM

## 2023-04-13 DIAGNOSIS — E11.621 TYPE 2 DIABETES MELLITUS WITH FOOT ULCER, UNSPECIFIED WHETHER LONG TERM INSULIN USE: ICD-10-CM

## 2023-04-13 LAB
ALBUMIN SERPL-MCNC: 4.3 G/DL (ref 3.5–5.2)
ALBUMIN/GLOB SERPL: 1.4 G/DL
ALP SERPL-CCNC: 110 U/L (ref 39–117)
ALT SERPL W P-5'-P-CCNC: 37 U/L (ref 1–41)
ANION GAP SERPL CALCULATED.3IONS-SCNC: 14 MMOL/L (ref 5–15)
ANION GAP SERPL CALCULATED.3IONS-SCNC: 16 MMOL/L (ref 5–15)
AST SERPL-CCNC: 29 U/L (ref 1–40)
BASOPHILS # BLD AUTO: 0.02 10*3/MM3 (ref 0–0.2)
BASOPHILS # BLD AUTO: NORMAL 10*3/UL
BASOPHILS NFR BLD AUTO: 0.2 % (ref 0–1.5)
BASOPHILS NFR BLD AUTO: NORMAL %
BILIRUB SERPL-MCNC: 0.7 MG/DL (ref 0–1.2)
BUN SERPL-MCNC: 27 MG/DL (ref 8–23)
BUN SERPL-MCNC: 28 MG/DL (ref 8–23)
BUN/CREAT SERPL: 20.9 (ref 7–25)
BUN/CREAT SERPL: 24.5 (ref 7–25)
CALCIUM SPEC-SCNC: 8.1 MG/DL (ref 8.6–10.5)
CALCIUM SPEC-SCNC: 9.1 MG/DL (ref 8.6–10.5)
CHLORIDE SERPL-SCNC: 101 MMOL/L (ref 98–107)
CHLORIDE SERPL-SCNC: 97 MMOL/L (ref 98–107)
CO2 SERPL-SCNC: 19 MMOL/L (ref 22–29)
CO2 SERPL-SCNC: 23 MMOL/L (ref 22–29)
CREAT SERPL-MCNC: 1.1 MG/DL (ref 0.76–1.27)
CREAT SERPL-MCNC: 1.34 MG/DL (ref 0.76–1.27)
CRP SERPL-MCNC: 1.26 MG/DL (ref 0–0.5)
DEPRECATED RDW RBC AUTO: 47.5 FL (ref 37–54)
EGFRCR SERPLBLD CKD-EPI 2021: 60.3 ML/MIN/1.73
EGFRCR SERPLBLD CKD-EPI 2021: 76.4 ML/MIN/1.73
EOSINOPHIL # BLD AUTO: 0.01 10*3/MM3 (ref 0–0.4)
EOSINOPHIL # BLD AUTO: NORMAL 10*3/UL
EOSINOPHIL NFR BLD AUTO: 0.1 % (ref 0.3–6.2)
EOSINOPHIL NFR BLD AUTO: NORMAL %
ERYTHROCYTE [DISTWIDTH] IN BLOOD BY AUTOMATED COUNT: 14 % (ref 12.3–15.4)
ERYTHROCYTE [DISTWIDTH] IN BLOOD BY AUTOMATED COUNT: NORMAL %
ERYTHROCYTE [SEDIMENTATION RATE] IN BLOOD: 14 MM/HR (ref 0–20)
GLOBULIN UR ELPH-MCNC: 3.1 GM/DL
GLUCOSE BLDC GLUCOMTR-MCNC: 156 MG/DL (ref 70–130)
GLUCOSE BLDC GLUCOMTR-MCNC: 220 MG/DL (ref 70–130)
GLUCOSE SERPL-MCNC: 149 MG/DL (ref 65–99)
GLUCOSE SERPL-MCNC: 220 MG/DL (ref 65–99)
HBA1C MFR BLD: 7.1 % (ref 4.8–5.6)
HCT VFR BLD AUTO: 38 % (ref 37.5–51)
HCT VFR BLD AUTO: NORMAL %
HGB BLD-MCNC: 12.5 G/DL (ref 13–17.7)
HGB BLD-MCNC: NORMAL G/DL
IMM GRANULOCYTES # BLD AUTO: 0.05 10*3/MM3 (ref 0–0.05)
IMM GRANULOCYTES NFR BLD AUTO: 0.6 % (ref 0–0.5)
LYMPHOCYTES # BLD AUTO: 0.59 10*3/MM3 (ref 0.7–3.1)
LYMPHOCYTES # BLD AUTO: NORMAL 10*3/UL
LYMPHOCYTES NFR BLD AUTO: 6.6 % (ref 19.6–45.3)
LYMPHOCYTES NFR BLD AUTO: NORMAL %
MCH RBC QN AUTO: 30.4 PG (ref 26.6–33)
MCH RBC QN AUTO: NORMAL PG
MCHC RBC AUTO-ENTMCNC: 32.9 G/DL (ref 31.5–35.7)
MCHC RBC AUTO-ENTMCNC: NORMAL G/DL
MCV RBC AUTO: 92.5 FL (ref 79–97)
MCV RBC AUTO: NORMAL FL
MONOCYTES # BLD AUTO: 0.55 10*3/MM3 (ref 0.1–0.9)
MONOCYTES # BLD AUTO: NORMAL 10*3/UL
MONOCYTES NFR BLD AUTO: 6.1 % (ref 5–12)
MONOCYTES NFR BLD AUTO: NORMAL %
NEUTROPHILS NFR BLD AUTO: 7.77 10*3/MM3 (ref 1.7–7)
NEUTROPHILS NFR BLD AUTO: 86.4 % (ref 42.7–76)
NEUTROPHILS NFR BLD AUTO: NORMAL %
NEUTROPHILS NFR BLD AUTO: NORMAL %
NRBC BLD AUTO-RTO: 0 /100 WBC (ref 0–0.2)
PLATELET # BLD AUTO: 132 10*3/MM3 (ref 140–450)
PLATELET # BLD AUTO: NORMAL 10*3/UL
PMV BLD AUTO: 10.3 FL (ref 6–12)
POTASSIUM SERPL-SCNC: 4.5 MMOL/L (ref 3.5–5.2)
POTASSIUM SERPL-SCNC: 5 MMOL/L (ref 3.5–5.2)
PROT SERPL-MCNC: 7.4 G/DL (ref 6–8.5)
RBC # BLD AUTO: 4.11 10*6/MM3 (ref 4.14–5.8)
RBC # BLD AUTO: NORMAL 10*6/UL
SODIUM SERPL-SCNC: 134 MMOL/L (ref 136–145)
SODIUM SERPL-SCNC: 136 MMOL/L (ref 136–145)
WBC NRBC COR # BLD: 8.99 10*3/MM3 (ref 3.4–10.8)
WBC NRBC COR # BLD: NORMAL 10*3/UL

## 2023-04-13 PROCEDURE — 82962 GLUCOSE BLOOD TEST: CPT

## 2023-04-13 PROCEDURE — 0SGG07Z FUSION OF LEFT ANKLE JOINT WITH AUTOLOGOUS TISSUE SUBSTITUTE, OPEN APPROACH: ICD-10-PCS | Performed by: PODIATRIST

## 2023-04-13 PROCEDURE — 86140 C-REACTIVE PROTEIN: CPT | Performed by: PODIATRIST

## 2023-04-13 PROCEDURE — 25010000002 PHENYLEPHRINE 10 MG/ML SOLUTION: Performed by: NURSE ANESTHETIST, CERTIFIED REGISTERED

## 2023-04-13 PROCEDURE — 25010000002 CEFAZOLIN PER 500 MG: Performed by: NURSE PRACTITIONER

## 2023-04-13 PROCEDURE — 85025 COMPLETE CBC W/AUTO DIFF WBC: CPT | Performed by: PODIATRIST

## 2023-04-13 PROCEDURE — 25010000002 PROPOFOL 200 MG/20ML EMULSION: Performed by: NURSE ANESTHETIST, CERTIFIED REGISTERED

## 2023-04-13 PROCEDURE — 0QBM0ZZ EXCISION OF LEFT TARSAL, OPEN APPROACH: ICD-10-PCS | Performed by: PODIATRIST

## 2023-04-13 PROCEDURE — 25010000002 FENTANYL CITRATE (PF) 100 MCG/2ML SOLUTION: Performed by: NURSE ANESTHETIST, CERTIFIED REGISTERED

## 2023-04-13 PROCEDURE — 25010000002 KETOROLAC TROMETHAMINE PER 15 MG: Performed by: NURSE ANESTHETIST, CERTIFIED REGISTERED

## 2023-04-13 PROCEDURE — 0SGG05Z FUSION OF LEFT ANKLE JOINT WITH EXTERNAL FIXATION DEVICE, OPEN APPROACH: ICD-10-PCS | Performed by: PODIATRIST

## 2023-04-13 PROCEDURE — 20692 APPL MLTPLN UNI EXT FIXJ SYS: CPT | Performed by: PODIATRIST

## 2023-04-13 PROCEDURE — 63710000001 ALBUTEROL SULFATE HFA 108 (90 BASE) MCG/ACT AEROSOL SOLUTION 8 G INHALER: Performed by: NURSE ANESTHETIST, CERTIFIED REGISTERED

## 2023-04-13 PROCEDURE — 25010000002 VANCOMYCIN 10 G RECONSTITUTED SOLUTION: Performed by: PODIATRIST

## 2023-04-13 PROCEDURE — 85652 RBC SED RATE AUTOMATED: CPT | Performed by: PODIATRIST

## 2023-04-13 PROCEDURE — 83036 HEMOGLOBIN GLYCOSYLATED A1C: CPT | Performed by: PODIATRIST

## 2023-04-13 PROCEDURE — 25010000002 NEOSTIGMINE 10 MG/10ML SOLUTION: Performed by: NURSE ANESTHETIST, CERTIFIED REGISTERED

## 2023-04-13 PROCEDURE — A9270 NON-COVERED ITEM OR SERVICE: HCPCS | Performed by: PODIATRIST

## 2023-04-13 PROCEDURE — 63710000001 SENNOSIDES-DOCUSATE 8.6-50 MG TABLET: Performed by: PODIATRIST

## 2023-04-13 PROCEDURE — 27641 PARTIAL REMOVAL OF FIBULA: CPT | Performed by: PODIATRIST

## 2023-04-13 PROCEDURE — 25010000002 ONDANSETRON PER 1 MG: Performed by: NURSE ANESTHETIST, CERTIFIED REGISTERED

## 2023-04-13 PROCEDURE — 76000 FLUOROSCOPY <1 HR PHYS/QHP: CPT

## 2023-04-13 PROCEDURE — 25010000002 DEXAMETHASONE PER 1 MG: Performed by: NURSE ANESTHETIST, CERTIFIED REGISTERED

## 2023-04-13 PROCEDURE — S0260 H&P FOR SURGERY: HCPCS | Performed by: PODIATRIST

## 2023-04-13 PROCEDURE — 80053 COMPREHEN METABOLIC PANEL: CPT | Performed by: PODIATRIST

## 2023-04-13 PROCEDURE — 0SGG04Z FUSION OF LEFT ANKLE JOINT WITH INTERNAL FIXATION DEVICE, OPEN APPROACH: ICD-10-PCS | Performed by: PODIATRIST

## 2023-04-13 PROCEDURE — 25010000002 HYDROMORPHONE 1 MG/ML SOLUTION: Performed by: NURSE ANESTHETIST, CERTIFIED REGISTERED

## 2023-04-13 PROCEDURE — 0QBK0ZZ EXCISION OF LEFT FIBULA, OPEN APPROACH: ICD-10-PCS | Performed by: PODIATRIST

## 2023-04-13 PROCEDURE — 63710000001 ACETAMINOPHEN 325 MG TABLET: Performed by: PODIATRIST

## 2023-04-13 PROCEDURE — 25010000002 SUCCINYLCHOLINE PER 20 MG: Performed by: NURSE ANESTHETIST, CERTIFIED REGISTERED

## 2023-04-13 PROCEDURE — 28130 REMOVAL OF ANKLE BONE: CPT | Performed by: PODIATRIST

## 2023-04-13 PROCEDURE — 25010000002 MIDAZOLAM PER 1 MG: Performed by: NURSE ANESTHETIST, CERTIFIED REGISTERED

## 2023-04-13 PROCEDURE — A9270 NON-COVERED ITEM OR SERVICE: HCPCS | Performed by: NURSE ANESTHETIST, CERTIFIED REGISTERED

## 2023-04-13 RX ORDER — MEPERIDINE HYDROCHLORIDE 50 MG/ML
12.5 INJECTION INTRAMUSCULAR; INTRAVENOUS; SUBCUTANEOUS
Status: DISCONTINUED | OUTPATIENT
Start: 2023-04-13 | End: 2023-04-13 | Stop reason: HOSPADM

## 2023-04-13 RX ORDER — PHENYLEPHRINE HYDROCHLORIDE 10 MG/ML
INJECTION INTRAVENOUS AS NEEDED
Status: DISCONTINUED | OUTPATIENT
Start: 2023-04-13 | End: 2023-04-13 | Stop reason: SURG

## 2023-04-13 RX ORDER — SODIUM CHLORIDE 9 MG/ML
100 INJECTION, SOLUTION INTRAVENOUS CONTINUOUS
Status: DISCONTINUED | OUTPATIENT
Start: 2023-04-13 | End: 2023-04-21 | Stop reason: HOSPADM

## 2023-04-13 RX ORDER — BISACODYL 5 MG/1
5 TABLET, DELAYED RELEASE ORAL DAILY PRN
Status: DISCONTINUED | OUTPATIENT
Start: 2023-04-13 | End: 2023-04-21 | Stop reason: HOSPADM

## 2023-04-13 RX ORDER — ONDANSETRON 2 MG/ML
4 INJECTION INTRAMUSCULAR; INTRAVENOUS EVERY 6 HOURS PRN
Status: DISCONTINUED | OUTPATIENT
Start: 2023-04-13 | End: 2023-04-21 | Stop reason: HOSPADM

## 2023-04-13 RX ORDER — EPHEDRINE SULFATE 50 MG/ML
5 INJECTION, SOLUTION INTRAVENOUS ONCE AS NEEDED
Status: DISCONTINUED | OUTPATIENT
Start: 2023-04-13 | End: 2023-04-13 | Stop reason: HOSPADM

## 2023-04-13 RX ORDER — SODIUM CHLORIDE 0.9 % (FLUSH) 0.9 %
10 SYRINGE (ML) INJECTION AS NEEDED
Status: DISCONTINUED | OUTPATIENT
Start: 2023-04-13 | End: 2023-04-21 | Stop reason: HOSPADM

## 2023-04-13 RX ORDER — NALOXONE HCL 0.4 MG/ML
0.4 VIAL (ML) INJECTION
Status: DISCONTINUED | OUTPATIENT
Start: 2023-04-13 | End: 2023-04-21 | Stop reason: HOSPADM

## 2023-04-13 RX ORDER — BISACODYL 10 MG
10 SUPPOSITORY, RECTAL RECTAL DAILY PRN
Status: DISCONTINUED | OUTPATIENT
Start: 2023-04-13 | End: 2023-04-21 | Stop reason: HOSPADM

## 2023-04-13 RX ORDER — PROMETHAZINE HYDROCHLORIDE 25 MG/1
25 TABLET ORAL ONCE AS NEEDED
Status: DISCONTINUED | OUTPATIENT
Start: 2023-04-13 | End: 2023-04-13 | Stop reason: HOSPADM

## 2023-04-13 RX ORDER — HEPARIN SODIUM 5000 [USP'U]/ML
5000 INJECTION, SOLUTION INTRAVENOUS; SUBCUTANEOUS EVERY 8 HOURS SCHEDULED
Status: DISCONTINUED | OUTPATIENT
Start: 2023-04-14 | End: 2023-04-21 | Stop reason: HOSPADM

## 2023-04-13 RX ORDER — KETAMINE HCL IN NACL, ISO-OSM 100MG/10ML
SYRINGE (ML) INJECTION AS NEEDED
Status: DISCONTINUED | OUTPATIENT
Start: 2023-04-13 | End: 2023-04-13

## 2023-04-13 RX ORDER — KETOROLAC TROMETHAMINE 15 MG/ML
INJECTION, SOLUTION INTRAMUSCULAR; INTRAVENOUS AS NEEDED
Status: DISCONTINUED | OUTPATIENT
Start: 2023-04-13 | End: 2023-04-13 | Stop reason: SURG

## 2023-04-13 RX ORDER — ALBUTEROL SULFATE 90 UG/1
AEROSOL, METERED RESPIRATORY (INHALATION) AS NEEDED
Status: DISCONTINUED | OUTPATIENT
Start: 2023-04-13 | End: 2023-04-13 | Stop reason: SURG

## 2023-04-13 RX ORDER — ONDANSETRON 2 MG/ML
INJECTION INTRAMUSCULAR; INTRAVENOUS AS NEEDED
Status: DISCONTINUED | OUTPATIENT
Start: 2023-04-13 | End: 2023-04-13 | Stop reason: SURG

## 2023-04-13 RX ORDER — SODIUM CHLORIDE 0.9 % (FLUSH) 0.9 %
10 SYRINGE (ML) INJECTION EVERY 12 HOURS SCHEDULED
Status: DISCONTINUED | OUTPATIENT
Start: 2023-04-13 | End: 2023-04-21 | Stop reason: HOSPADM

## 2023-04-13 RX ORDER — ACETAMINOPHEN 325 MG/1
650 TABLET ORAL ONCE AS NEEDED
Status: DISCONTINUED | OUTPATIENT
Start: 2023-04-13 | End: 2023-04-13 | Stop reason: HOSPADM

## 2023-04-13 RX ORDER — CEFAZOLIN SODIUM IN 0.9 % NACL 3 G/100 ML
3 INTRAVENOUS SOLUTION, PIGGYBACK (ML) INTRAVENOUS ONCE
Status: COMPLETED | OUTPATIENT
Start: 2023-04-13 | End: 2023-04-13

## 2023-04-13 RX ORDER — ACETAMINOPHEN 325 MG/1
650 TABLET ORAL EVERY 6 HOURS PRN
Status: DISCONTINUED | OUTPATIENT
Start: 2023-04-13 | End: 2023-04-21 | Stop reason: HOSPADM

## 2023-04-13 RX ORDER — SODIUM CHLORIDE 9 MG/ML
1000 INJECTION, SOLUTION INTRAVENOUS CONTINUOUS
Status: DISCONTINUED | OUTPATIENT
Start: 2023-04-13 | End: 2023-04-13

## 2023-04-13 RX ORDER — FLUMAZENIL 0.1 MG/ML
0.2 INJECTION INTRAVENOUS AS NEEDED
Status: DISCONTINUED | OUTPATIENT
Start: 2023-04-13 | End: 2023-04-13 | Stop reason: HOSPADM

## 2023-04-13 RX ORDER — DEXAMETHASONE SODIUM PHOSPHATE 4 MG/ML
INJECTION, SOLUTION INTRA-ARTICULAR; INTRALESIONAL; INTRAMUSCULAR; INTRAVENOUS; SOFT TISSUE AS NEEDED
Status: DISCONTINUED | OUTPATIENT
Start: 2023-04-13 | End: 2023-04-13 | Stop reason: SURG

## 2023-04-13 RX ORDER — NEOSTIGMINE METHYLSULFATE 1 MG/ML
INJECTION, SOLUTION INTRAVENOUS AS NEEDED
Status: DISCONTINUED | OUTPATIENT
Start: 2023-04-13 | End: 2023-04-13 | Stop reason: SURG

## 2023-04-13 RX ORDER — AMOXICILLIN 250 MG
2 CAPSULE ORAL 2 TIMES DAILY
Status: DISCONTINUED | OUTPATIENT
Start: 2023-04-13 | End: 2023-04-21 | Stop reason: HOSPADM

## 2023-04-13 RX ORDER — KETAMINE HYDROCHLORIDE 50 MG/ML
INJECTION, SOLUTION, CONCENTRATE INTRAMUSCULAR; INTRAVENOUS AS NEEDED
Status: DISCONTINUED | OUTPATIENT
Start: 2023-04-13 | End: 2023-04-13 | Stop reason: SURG

## 2023-04-13 RX ORDER — ACETAMINOPHEN 650 MG/1
650 SUPPOSITORY RECTAL ONCE AS NEEDED
Status: DISCONTINUED | OUTPATIENT
Start: 2023-04-13 | End: 2023-04-13 | Stop reason: HOSPADM

## 2023-04-13 RX ORDER — EPHEDRINE SULFATE 50 MG/ML
INJECTION INTRAVENOUS AS NEEDED
Status: DISCONTINUED | OUTPATIENT
Start: 2023-04-13 | End: 2023-04-13 | Stop reason: SURG

## 2023-04-13 RX ORDER — ZINC GLUCONATE 50 MG
1 TABLET ORAL NIGHTLY
Status: ON HOLD | COMMUNITY

## 2023-04-13 RX ORDER — PROMETHAZINE HYDROCHLORIDE 25 MG/1
25 SUPPOSITORY RECTAL ONCE AS NEEDED
Status: DISCONTINUED | OUTPATIENT
Start: 2023-04-13 | End: 2023-04-13 | Stop reason: HOSPADM

## 2023-04-13 RX ORDER — DIPHENHYDRAMINE HYDROCHLORIDE 50 MG/ML
12.5 INJECTION INTRAMUSCULAR; INTRAVENOUS
Status: DISCONTINUED | OUTPATIENT
Start: 2023-04-13 | End: 2023-04-13 | Stop reason: HOSPADM

## 2023-04-13 RX ORDER — VECURONIUM BROMIDE 1 MG/ML
INJECTION, POWDER, LYOPHILIZED, FOR SOLUTION INTRAVENOUS AS NEEDED
Status: DISCONTINUED | OUTPATIENT
Start: 2023-04-13 | End: 2023-04-13 | Stop reason: SURG

## 2023-04-13 RX ORDER — POLYETHYLENE GLYCOL 3350 17 G/17G
17 POWDER, FOR SOLUTION ORAL DAILY PRN
Status: DISCONTINUED | OUTPATIENT
Start: 2023-04-13 | End: 2023-04-21 | Stop reason: HOSPADM

## 2023-04-13 RX ORDER — MIDAZOLAM HYDROCHLORIDE 1 MG/ML
INJECTION INTRAMUSCULAR; INTRAVENOUS AS NEEDED
Status: DISCONTINUED | OUTPATIENT
Start: 2023-04-13 | End: 2023-04-13 | Stop reason: SURG

## 2023-04-13 RX ORDER — SUCCINYLCHOLINE CHLORIDE 20 MG/ML
INJECTION INTRAMUSCULAR; INTRAVENOUS AS NEEDED
Status: DISCONTINUED | OUTPATIENT
Start: 2023-04-13 | End: 2023-04-13 | Stop reason: SURG

## 2023-04-13 RX ORDER — PROPOFOL 10 MG/ML
INJECTION, EMULSION INTRAVENOUS AS NEEDED
Status: DISCONTINUED | OUTPATIENT
Start: 2023-04-13 | End: 2023-04-13 | Stop reason: SURG

## 2023-04-13 RX ORDER — OXYCODONE AND ACETAMINOPHEN 7.5; 325 MG/1; MG/1
1 TABLET ORAL EVERY 4 HOURS PRN
Status: ACTIVE | OUTPATIENT
Start: 2023-04-13 | End: 2023-04-20

## 2023-04-13 RX ORDER — LIDOCAINE HYDROCHLORIDE 20 MG/ML
INJECTION, SOLUTION EPIDURAL; INFILTRATION; INTRACAUDAL; PERINEURAL AS NEEDED
Status: DISCONTINUED | OUTPATIENT
Start: 2023-04-13 | End: 2023-04-13 | Stop reason: SURG

## 2023-04-13 RX ORDER — FENTANYL CITRATE 50 UG/ML
INJECTION, SOLUTION INTRAMUSCULAR; INTRAVENOUS AS NEEDED
Status: DISCONTINUED | OUTPATIENT
Start: 2023-04-13 | End: 2023-04-13 | Stop reason: SURG

## 2023-04-13 RX ORDER — SODIUM CHLORIDE 9 MG/ML
40 INJECTION, SOLUTION INTRAVENOUS AS NEEDED
Status: DISCONTINUED | OUTPATIENT
Start: 2023-04-13 | End: 2023-04-21 | Stop reason: HOSPADM

## 2023-04-13 RX ORDER — NALOXONE HCL 0.4 MG/ML
0.4 VIAL (ML) INJECTION AS NEEDED
Status: DISCONTINUED | OUTPATIENT
Start: 2023-04-13 | End: 2023-04-13 | Stop reason: HOSPADM

## 2023-04-13 RX ORDER — ONDANSETRON 2 MG/ML
4 INJECTION INTRAMUSCULAR; INTRAVENOUS ONCE AS NEEDED
Status: DISCONTINUED | OUTPATIENT
Start: 2023-04-13 | End: 2023-04-13 | Stop reason: HOSPADM

## 2023-04-13 RX ADMIN — SODIUM CHLORIDE 1000 ML: 9 INJECTION, SOLUTION INTRAMUSCULAR; INTRAVENOUS; SUBCUTANEOUS at 11:13

## 2023-04-13 RX ADMIN — PROPOFOL 200 MG: 10 INJECTION, EMULSION INTRAVENOUS at 14:46

## 2023-04-13 RX ADMIN — PHENYLEPHRINE HYDROCHLORIDE 100 MCG: 10 INJECTION, SOLUTION INTRAVENOUS at 15:19

## 2023-04-13 RX ADMIN — VECURONIUM BROMIDE 2 MG: 10 INJECTION, POWDER, LYOPHILIZED, FOR SOLUTION INTRAVENOUS at 15:36

## 2023-04-13 RX ADMIN — VECURONIUM BROMIDE 2 MG: 10 INJECTION, POWDER, LYOPHILIZED, FOR SOLUTION INTRAVENOUS at 18:05

## 2023-04-13 RX ADMIN — VECURONIUM BROMIDE 5 MG: 10 INJECTION, POWDER, LYOPHILIZED, FOR SOLUTION INTRAVENOUS at 15:00

## 2023-04-13 RX ADMIN — Medication 10 ML: at 22:21

## 2023-04-13 RX ADMIN — KETAMINE HYDROCHLORIDE 10 MG: 50 INJECTION INTRAMUSCULAR; INTRAVENOUS at 15:35

## 2023-04-13 RX ADMIN — VECURONIUM BROMIDE 5 MG: 10 INJECTION, POWDER, LYOPHILIZED, FOR SOLUTION INTRAVENOUS at 17:20

## 2023-04-13 RX ADMIN — NEOSTIGMINE METHYLSULFATE 2 MG: 0.5 INJECTION INTRAVENOUS at 19:27

## 2023-04-13 RX ADMIN — PHENYLEPHRINE HYDROCHLORIDE 100 MCG: 10 INJECTION, SOLUTION INTRAVENOUS at 15:25

## 2023-04-13 RX ADMIN — PHENYLEPHRINE HYDROCHLORIDE 100 MCG: 10 INJECTION, SOLUTION INTRAVENOUS at 15:38

## 2023-04-13 RX ADMIN — SUCCINYLCHOLINE CHLORIDE 120 MG: 20 INJECTION, SOLUTION INTRAMUSCULAR; INTRAVENOUS at 14:46

## 2023-04-13 RX ADMIN — EPHEDRINE SULFATE 5 MG: 50 INJECTION INTRAVENOUS at 15:26

## 2023-04-13 RX ADMIN — KETAMINE HYDROCHLORIDE 10 MG: 50 INJECTION INTRAMUSCULAR; INTRAVENOUS at 17:53

## 2023-04-13 RX ADMIN — HYDROMORPHONE HYDROCHLORIDE 0.5 MG: 1 INJECTION, SOLUTION INTRAMUSCULAR; INTRAVENOUS; SUBCUTANEOUS at 20:15

## 2023-04-13 RX ADMIN — ALBUTEROL SULFATE 2 PUFF: 90 AEROSOL, METERED RESPIRATORY (INHALATION) at 19:34

## 2023-04-13 RX ADMIN — VANCOMYCIN HYDROCHLORIDE 2250 MG: 10 INJECTION, POWDER, LYOPHILIZED, FOR SOLUTION INTRAVENOUS at 22:20

## 2023-04-13 RX ADMIN — MIDAZOLAM HYDROCHLORIDE 2 MG: 1 INJECTION, SOLUTION INTRAMUSCULAR; INTRAVENOUS at 14:41

## 2023-04-13 RX ADMIN — KETAMINE HYDROCHLORIDE 10 MG: 50 INJECTION INTRAMUSCULAR; INTRAVENOUS at 17:23

## 2023-04-13 RX ADMIN — GLYCOPYRROLATE 0.4 MCG: 0.2 INJECTION, SOLUTION INTRAMUSCULAR; INTRAVITREAL at 19:38

## 2023-04-13 RX ADMIN — VECURONIUM BROMIDE 3 MG: 10 INJECTION, POWDER, LYOPHILIZED, FOR SOLUTION INTRAVENOUS at 16:01

## 2023-04-13 RX ADMIN — VECURONIUM BROMIDE 3 MG: 10 INJECTION, POWDER, LYOPHILIZED, FOR SOLUTION INTRAVENOUS at 16:36

## 2023-04-13 RX ADMIN — ACETAMINOPHEN 650 MG: 325 TABLET ORAL at 22:21

## 2023-04-13 RX ADMIN — PHENYLEPHRINE HYDROCHLORIDE 100 MCG: 10 INJECTION, SOLUTION INTRAVENOUS at 15:26

## 2023-04-13 RX ADMIN — HYDROMORPHONE HYDROCHLORIDE 0.5 MG: 1 INJECTION, SOLUTION INTRAMUSCULAR; INTRAVENOUS; SUBCUTANEOUS at 19:01

## 2023-04-13 RX ADMIN — PHENYLEPHRINE HYDROCHLORIDE 100 MCG: 10 INJECTION, SOLUTION INTRAVENOUS at 15:21

## 2023-04-13 RX ADMIN — FENTANYL CITRATE 50 MCG: 50 INJECTION, SOLUTION INTRAMUSCULAR; INTRAVENOUS at 14:50

## 2023-04-13 RX ADMIN — VECURONIUM BROMIDE 1 MG: 10 INJECTION, POWDER, LYOPHILIZED, FOR SOLUTION INTRAVENOUS at 18:35

## 2023-04-13 RX ADMIN — DOCUSATE SODIUM 50 MG AND SENNOSIDES 8.6 MG 2 TABLET: 8.6; 5 TABLET, FILM COATED ORAL at 22:20

## 2023-04-13 RX ADMIN — Medication 3 G: at 18:27

## 2023-04-13 RX ADMIN — ALBUTEROL SULFATE 2 PUFF: 90 AEROSOL, METERED RESPIRATORY (INHALATION) at 15:08

## 2023-04-13 RX ADMIN — SODIUM CHLORIDE 100 ML/HR: 9 INJECTION, SOLUTION INTRAVENOUS at 22:21

## 2023-04-13 RX ADMIN — GLYCOPYRROLATE 0.4 MCG: 0.2 INJECTION, SOLUTION INTRAMUSCULAR; INTRAVITREAL at 19:27

## 2023-04-13 RX ADMIN — ONDANSETRON 4 MG: 2 INJECTION INTRAMUSCULAR; INTRAVENOUS at 19:19

## 2023-04-13 RX ADMIN — LIDOCAINE HYDROCHLORIDE 100 MG: 20 INJECTION, SOLUTION EPIDURAL; INFILTRATION; INTRACAUDAL; PERINEURAL at 14:46

## 2023-04-13 RX ADMIN — FENTANYL CITRATE 50 MCG: 50 INJECTION, SOLUTION INTRAMUSCULAR; INTRAVENOUS at 15:28

## 2023-04-13 RX ADMIN — VECURONIUM BROMIDE 2 MG: 10 INJECTION, POWDER, LYOPHILIZED, FOR SOLUTION INTRAVENOUS at 16:39

## 2023-04-13 RX ADMIN — VECURONIUM BROMIDE 5 MG: 10 INJECTION, POWDER, LYOPHILIZED, FOR SOLUTION INTRAVENOUS at 15:08

## 2023-04-13 RX ADMIN — KETAMINE HYDROCHLORIDE 10 MG: 50 INJECTION INTRAMUSCULAR; INTRAVENOUS at 16:32

## 2023-04-13 RX ADMIN — DEXAMETHASONE SODIUM PHOSPHATE 4 MG: 4 INJECTION, SOLUTION INTRAMUSCULAR; INTRAVENOUS at 18:02

## 2023-04-13 RX ADMIN — Medication 3 G: at 14:45

## 2023-04-13 RX ADMIN — SODIUM CHLORIDE: 9 INJECTION, SOLUTION INTRAMUSCULAR; INTRAVENOUS; SUBCUTANEOUS at 15:40

## 2023-04-13 RX ADMIN — NEOSTIGMINE METHYLSULFATE 3 MG: 0.5 INJECTION INTRAVENOUS at 19:38

## 2023-04-13 RX ADMIN — KETOROLAC TROMETHAMINE 15 MG: 15 INJECTION, SOLUTION INTRAMUSCULAR; INTRAVENOUS at 19:19

## 2023-04-13 NOTE — H&P
[facility]   PREOPERATIVE HISTORY AND PHYSICAL    Patient Name:Emre Lisa  : 1960  MRN: [id number]  Primary Care Physician: Jamaal Bravo MD  Date of admission: 2023    Subjective   Subjective     Chief Complaint: preoperative evaluation    History of Present Illness  Emre Lisa is a 63 y.o. male who presents for preoperative evaluation. He is scheduled for open reduction of left ankle deformity, stabilization with external fixator and all other indicated procedures.     Review of Systems   Constitutional: Negative.    Respiratory: Negative.    Cardiovascular: Positive for leg swelling.   Skin: Positive for wound.   Psychiatric/Behavioral: Negative.         Personal History     Past Medical History:   Diagnosis Date   • Arthritis    • Atrial fibrillation    • Diabetes mellitus    • Diabetic foot ulcer associated with type 2 diabetes mellitus     left great toe   • Hallux malleus of left foot    • Hammer toe    • Hypertension    • MI (myocardial infarction)    • Neuropathy in diabetes    • Onychomycosis    • Presence of biventricular cardiac pacemaker    • Rheumatoid arthritis        Past Surgical History:   Procedure Laterality Date   • AMPUTATION DIGIT Right 3/5/2017    Procedure: RIGHT AMPUTATION TRANSMETATRASAL , RECESSION GASTROCNEMOUS;  Surgeon: Marco A Thompson DPM;  Location: [facility];  Service:    • CARDIAC DEFIBRILLATOR PLACEMENT  ,    • CARPAL TUNNEL RELEASE      elbow and wrist left arm   • FOOT IRRIGATION, DEBRIDEMENT AND REPAIR Left 3/7/2018    Procedure: FOOT IRRIGATION, DEBRIDEMENT AND REPAIR;  Surgeon: Marco A Thompson DPM;  Location: [facility];  Service:    • FOOT IRRIGATION, DEBRIDEMENT AND REPAIR Left 3/10/2018    Procedure: FOOT IRRIGATION, DEBRIDEMENT AND REPAIR;  Surgeon: Marco A Thompson DPM;  Location: [facility];  Service: Podiatry   • FOOT WOUND CLOSURE Right 3/2/2017    Procedure: INCISION AND DRAINAGE, RIGHT FOOT;  Surgeon: Tung HERNANDEZ  CHRISTIAN Rosenthal;  Location: [facility] OR;  Service:    • HAMMER TOE REPAIR Left 2/15/2018    Procedure: HAMMERTOE CORRECTION LEFT SECOND, THIRD AND FOURTH TOES AND HALLUX INTERPHALANGEAL JOINT ARTHRODESIS LEFT FOOT (Micro Asnis, 4.0 & 5.0 Asnis)      (c-arm);  Surgeon: Marco A Thompson DPM;  Location: [facility] OR;  Service:    • HARDWARE REMOVAL Left 3/5/2018    Procedure: ANKLE/FOOT HARDWARE REMOVAL;  Surgeon: Marco A Thompson DPM;  Location: [facility] OR;  Service:    • INCISION AND DRAINAGE LEG Left 3/5/2018    Procedure: INCISION AND DRAINAGE LOWER EXTREMITY;  Surgeon: Marco A Thompson DPM;  Location: [facility] OR;  Service:    • PLANTAR FASCIA RELEASE Left 11/21/2019    Procedure: PLANTAR PLANING LEFT FOOT AND ALL OTHER INDICATED PROCEDURES;  Surgeon: Marco A Thompson DPM;  Location: [facility] OR;  Service: Podiatry   • TOE AMPUTATION Right 2016   • TONSILECTOMY, ADENOIDECTOMY, BILATERAL MYRINGOTOMY AND TUBES      age 25       Family History: His family history includes Diabetes in his father; Heart disease in an other family member; Hypertension in an other family member; No Known Problems in his daughter, daughter, maternal grandfather, maternal grandmother, paternal grandfather, paternal grandmother, and son; Stroke in his father.     Social History: He  reports that he has never smoked. He has never used smokeless tobacco. He reports current alcohol use. He reports that he does not use drugs.    Home Medications:  Cholecalciferol, ONE TOUCH ULTRA 2, Probiotic, Zinc, ascorbic acid, clindamycin, dronedarone, fexofenadine, fluticasone, furosemide, glimepiride, glucose blood, losartan, magnesium oxide, metFORMIN, metoprolol succinate XL, and multivitamin    Allergies:  He is allergic to januvia [sitagliptin], lipitor [atorvastatin], shellfish allergy, and sulfa antibiotics.    Objective    Objective     Vitals:    Temp:  [96.9 °F (36.1 °C)] 96.9 °F (36.1 °C)  Heart Rate:  [84] 84  Resp:  [20] 20  BP: (122)/(84) 122/84    Physical  Exam  Vitals reviewed.   Constitutional:       General: He is not in acute distress.     Appearance: He is well-developed.   HENT:      Head: Normocephalic and atraumatic.      Nose: Nose normal.   Eyes:      Conjunctiva/sclera: Conjunctivae normal.      Pupils: Pupils are equal, round, and reactive to light.   Pulmonary:      Effort: Pulmonary effort is normal. No respiratory distress.      Breath sounds: No wheezing.   Musculoskeletal:      Left ankle: Swelling and deformity present. Decreased range of motion. Normal pulse.      Left Achilles Tendon: Normal.   Skin:     General: Skin is warm and dry.      Capillary Refill: Capillary refill takes less than 2 seconds.   Neurological:      Mental Status: He is alert and oriented to person, place, and time.   Psychiatric:         Behavior: Behavior normal.         Thought Content: Thought content normal.         Assessment & Plan   Assessment / Plan     Brief Patient Summary:  Emre Lisa is a 63 y.o. male who presents for preoperative evaluation.    Pre-Op Diagnosis Codes:     * Cellulitis of left foot [L03.116]     * Charcot's joint of left foot [M14.672]     * Type 2 diabetes mellitus with foot ulcer, unspecified whether long term insulin use [E11.621, L97.509]    Active Hospital Problems:  Active Hospital Problems    Diagnosis    • Charcot's joint of left foot    • Cellulitis of left foot    • Type 2 diabetes mellitus with skin complication      Plan:   Procedure(s):  open reduction of left ankle deformity, stabilization with external fixator and all other indicated procedures    The risks, benefits, and alternatives of the procedure including but not limited to infection, wound healing complications, blood clots, leg amputation and risks of the anesthesia were discussed in detail with the patient and questions were answered. No guarantees were made or implied. Informed consent was obtained.    Marco A Thompson DPM

## 2023-04-13 NOTE — ANESTHESIA PREPROCEDURE EVALUATION
Anesthesia Evaluation     Patient summary reviewed and Nursing notes reviewed   no history of anesthetic complications:  NPO Solid Status: > 8 hours  NPO Liquid Status: > 2 hours           Airway   Mallampati: II  TM distance: >3 FB  Neck ROM: full  Possible difficult intubation  Comment: His neck has decreased extension as well as decreased ability to turn to left or right.  Full trimmed beard.Final Airway Details  Final airway type: supraglottic airway        Successful airway: I-gel  Size 5    Assessment: lips, teeth, and gum same as pre-op and atraumatic intubation     Additional Comments  LMA placed by Lisa PHILLIPS  Dental    (+) poor dentation        Pulmonary - negative pulmonary ROS    breath sounds clear to auscultation  (-) COPD, asthma, shortness of breath, sleep apnea, not a smoker    ROS comment: snores  PE comment: AICD is in left chest wall  Cardiovascular   Exercise tolerance: good (4-7 METS)    ECG reviewed  Patient on routine beta blocker and Beta blocker given within 24 hours of surgery  Rhythm: regular  Rate: normal    (+) pacemaker ( originally inserted in 2010 and replaced 7/2016. Has not been discharged since placement.) ICD, pacemaker, hypertension well controlled, past MI ( he states he had a silent MI 30 years ago and had clean coronary arteries in a 2010 catheterization.)  >12 months, dysrhythmias Atrial Fib, FINN,   (-) angina, murmur, cardiac stents    ROS comment: History cardiac defibrillator    Normal sinus rhythm  Normal ECG  When compared with ECG of 07-MAR-2018 18:04,  No significant change was found  Confirmed by ZAKI COBIAN MD ([id number]) on 11/11/2019 3:02:22 PM     Referred By:             Confirmed By:ZAKI COBIAN MD    Neuro/Psych  (+) numbness (estevan feet neuropathy),    (-) seizures, TIA, CVA, headaches, psychiatric history  GI/Hepatic/Renal/Endo    (+) morbid obesity,  diabetes mellitus type 2 well controlled,   (-) hepatitis, liver disease, no renal diseaseGERD:   he is on protonix= ordered pre-op, but he denies having GERD.    Musculoskeletal     (+) arthralgias,       ROS comment: Charcot's foot  Abdominal   (+) obese,    Substance History - negative use     OB/GYN negative ob/gyn ROS         Other   arthritis,                        Anesthesia Plan    ASA 3     general     (ray available)  intravenous induction     Anesthetic plan, risks, benefits, and alternatives have been provided, discussed and informed consent has been obtained with: patient and spouse/significant other.  Pre-procedure education provided  Plan discussed with CRNA.

## 2023-04-13 NOTE — ANESTHESIA PROCEDURE NOTES
Airway  Urgency: elective    Date/Time: 4/13/2023 2:46 PM  End Time:4/13/2023 2:46 PM  Airway not difficult    General Information and Staff    Patient location during procedure: OR  CRNA/CAA: Faviola Sotelo CRNA    Indications and Patient Condition  Indications for airway management: airway protection    Preoxygenated: yes  MILS maintained throughout  Mask difficulty assessment: 0 - not attempted    Final Airway Details  Final airway type: endotracheal airway      Successful airway: ETT  Cuffed: yes   Successful intubation technique: video laryngoscopy  Facilitating devices/methods: intubating stylet and cricoid pressure  Endotracheal tube insertion site: oral  Blade: Ghotra  Blade size: 4  ETT size (mm): 7.5  Cormack-Lehane Classification: grade IIb - view of arytenoids or posterior of glottis only  Placement verified by: chest auscultation and capnometry   Cuff volume (mL): 8  Measured from: lips  ETT/EBT  to lips (cm): 24  Number of attempts at approach: 1  Assessment: lips, teeth, and gum same as pre-op and atraumatic intubation    Additional Comments  Ramped, Reverse Trend, 5 min pre O2 to ET O2 >80%. Intubation per  Antoine SRNA

## 2023-04-14 LAB
ANION GAP SERPL CALCULATED.3IONS-SCNC: 11 MMOL/L (ref 5–15)
BASOPHILS # BLD AUTO: 0.02 10*3/MM3 (ref 0–0.2)
BASOPHILS NFR BLD AUTO: 0.3 % (ref 0–1.5)
BUN SERPL-MCNC: 23 MG/DL (ref 8–23)
BUN/CREAT SERPL: 20.4 (ref 7–25)
CALCIUM SPEC-SCNC: 8.1 MG/DL (ref 8.6–10.5)
CHLORIDE SERPL-SCNC: 101 MMOL/L (ref 98–107)
CO2 SERPL-SCNC: 25 MMOL/L (ref 22–29)
CREAT SERPL-MCNC: 1.13 MG/DL (ref 0.76–1.27)
DEPRECATED RDW RBC AUTO: 46.6 FL (ref 37–54)
EGFRCR SERPLBLD CKD-EPI 2021: 73.9 ML/MIN/1.73
EOSINOPHIL # BLD AUTO: 0 10*3/MM3 (ref 0–0.4)
EOSINOPHIL NFR BLD AUTO: 0 % (ref 0.3–6.2)
ERYTHROCYTE [DISTWIDTH] IN BLOOD BY AUTOMATED COUNT: 13.9 % (ref 12.3–15.4)
GLUCOSE SERPL-MCNC: 172 MG/DL (ref 65–99)
HCT VFR BLD AUTO: 32.7 % (ref 37.5–51)
HGB BLD-MCNC: 10.7 G/DL (ref 13–17.7)
IMM GRANULOCYTES # BLD AUTO: 0.03 10*3/MM3 (ref 0–0.05)
IMM GRANULOCYTES NFR BLD AUTO: 0.4 % (ref 0–0.5)
LYMPHOCYTES # BLD AUTO: 0.85 10*3/MM3 (ref 0.7–3.1)
LYMPHOCYTES NFR BLD AUTO: 11 % (ref 19.6–45.3)
MCH RBC QN AUTO: 30.2 PG (ref 26.6–33)
MCHC RBC AUTO-ENTMCNC: 32.7 G/DL (ref 31.5–35.7)
MCV RBC AUTO: 92.4 FL (ref 79–97)
MONOCYTES # BLD AUTO: 0.88 10*3/MM3 (ref 0.1–0.9)
MONOCYTES NFR BLD AUTO: 11.4 % (ref 5–12)
NEUTROPHILS NFR BLD AUTO: 5.93 10*3/MM3 (ref 1.7–7)
NEUTROPHILS NFR BLD AUTO: 76.9 % (ref 42.7–76)
NRBC BLD AUTO-RTO: 0 /100 WBC (ref 0–0.2)
PLATELET # BLD AUTO: 139 10*3/MM3 (ref 140–450)
PMV BLD AUTO: 10.8 FL (ref 6–12)
POTASSIUM SERPL-SCNC: 4.9 MMOL/L (ref 3.5–5.2)
RBC # BLD AUTO: 3.54 10*6/MM3 (ref 4.14–5.8)
SODIUM SERPL-SCNC: 137 MMOL/L (ref 136–145)
WBC NRBC COR # BLD: 7.71 10*3/MM3 (ref 3.4–10.8)

## 2023-04-14 PROCEDURE — 63710000001 VITAMIN C 500 MG TABLET: Performed by: PODIATRIST

## 2023-04-14 PROCEDURE — 63710000001 ACETAMINOPHEN 325 MG TABLET: Performed by: PODIATRIST

## 2023-04-14 PROCEDURE — A9270 NON-COVERED ITEM OR SERVICE: HCPCS | Performed by: PODIATRIST

## 2023-04-14 PROCEDURE — 25010000002 AMPICILLIN-SULBACTAM PER 1.5 G: Performed by: PODIATRIST

## 2023-04-14 PROCEDURE — 63710000001 MAGNESIUM OXIDE 400 (240 MG) MG TABLET: Performed by: PODIATRIST

## 2023-04-14 PROCEDURE — 63710000001 SENNOSIDES-DOCUSATE 8.6-50 MG TABLET: Performed by: PODIATRIST

## 2023-04-14 PROCEDURE — 97166 OT EVAL MOD COMPLEX 45 MIN: CPT

## 2023-04-14 PROCEDURE — 63710000001 VITAMIN D3 125 MCG (5000 UT) TABLET: Performed by: PODIATRIST

## 2023-04-14 PROCEDURE — 97162 PT EVAL MOD COMPLEX 30 MIN: CPT

## 2023-04-14 PROCEDURE — 25010000002 VANCOMYCIN 10 G RECONSTITUTED SOLUTION: Performed by: PODIATRIST

## 2023-04-14 PROCEDURE — P9612 CATHETERIZE FOR URINE SPEC: HCPCS

## 2023-04-14 PROCEDURE — 63710000001 METFORMIN 500 MG TABLET: Performed by: PODIATRIST

## 2023-04-14 PROCEDURE — 85025 COMPLETE CBC W/AUTO DIFF WBC: CPT | Performed by: PODIATRIST

## 2023-04-14 PROCEDURE — 63710000001 DRONEDARONE 400 MG TABLET: Performed by: PODIATRIST

## 2023-04-14 PROCEDURE — 63710000001 CETIRIZINE 10 MG TABLET: Performed by: PODIATRIST

## 2023-04-14 PROCEDURE — 63710000001 LOSARTAN 25 MG TABLET: Performed by: PODIATRIST

## 2023-04-14 PROCEDURE — 25010000002 HEPARIN (PORCINE) PER 1000 UNITS: Performed by: PODIATRIST

## 2023-04-14 PROCEDURE — 99024 POSTOP FOLLOW-UP VISIT: CPT | Performed by: PODIATRIST

## 2023-04-14 PROCEDURE — 80048 BASIC METABOLIC PNL TOTAL CA: CPT | Performed by: PODIATRIST

## 2023-04-14 PROCEDURE — 63710000001 METOPROLOL SUCCINATE XL 25 MG TABLET SUSTAINED-RELEASE 24 HOUR: Performed by: PODIATRIST

## 2023-04-14 RX ORDER — NICOTINE POLACRILEX 4 MG
15 LOZENGE BUCCAL
Status: DISCONTINUED | OUTPATIENT
Start: 2023-04-14 | End: 2023-04-21 | Stop reason: HOSPADM

## 2023-04-14 RX ORDER — METOPROLOL SUCCINATE 25 MG/1
25 TABLET, EXTENDED RELEASE ORAL 2 TIMES DAILY
Status: DISCONTINUED | OUTPATIENT
Start: 2023-04-14 | End: 2023-04-21 | Stop reason: HOSPADM

## 2023-04-14 RX ORDER — DEXTROSE MONOHYDRATE 25 G/50ML
25 INJECTION, SOLUTION INTRAVENOUS
Status: DISCONTINUED | OUTPATIENT
Start: 2023-04-14 | End: 2023-04-21 | Stop reason: HOSPADM

## 2023-04-14 RX ORDER — LOSARTAN POTASSIUM 25 MG/1
25 TABLET ORAL DAILY
Status: DISCONTINUED | OUTPATIENT
Start: 2023-04-14 | End: 2023-04-21 | Stop reason: HOSPADM

## 2023-04-14 RX ORDER — FLUTICASONE PROPIONATE 50 MCG
2 SPRAY, SUSPENSION (ML) NASAL DAILY PRN
Status: DISCONTINUED | OUTPATIENT
Start: 2023-04-14 | End: 2023-04-21 | Stop reason: HOSPADM

## 2023-04-14 RX ORDER — VANCOMYCIN 2 GRAM/500 ML IN 0.9 % SODIUM CHLORIDE INTRAVENOUS
2000 EVERY 12 HOURS
Status: DISCONTINUED | OUTPATIENT
Start: 2023-04-14 | End: 2023-04-15

## 2023-04-14 RX ORDER — CETIRIZINE HYDROCHLORIDE 10 MG/1
10 TABLET ORAL DAILY
Status: DISCONTINUED | OUTPATIENT
Start: 2023-04-14 | End: 2023-04-21 | Stop reason: HOSPADM

## 2023-04-14 RX ORDER — ASCORBIC ACID 500 MG
1000 TABLET ORAL NIGHTLY
Status: DISCONTINUED | OUTPATIENT
Start: 2023-04-14 | End: 2023-04-21 | Stop reason: HOSPADM

## 2023-04-14 RX ORDER — GLIPIZIDE 5 MG/1
10 TABLET ORAL
Status: DISCONTINUED | OUTPATIENT
Start: 2023-04-15 | End: 2023-04-21 | Stop reason: HOSPADM

## 2023-04-14 RX ADMIN — AMPICILLIN SODIUM AND SULBACTAM SODIUM 3 G: 2; 1 INJECTION, POWDER, FOR SOLUTION INTRAMUSCULAR; INTRAVENOUS at 00:15

## 2023-04-14 RX ADMIN — AMPICILLIN SODIUM AND SULBACTAM SODIUM 3 G: 2; 1 INJECTION, POWDER, FOR SOLUTION INTRAMUSCULAR; INTRAVENOUS at 05:38

## 2023-04-14 RX ADMIN — LOSARTAN POTASSIUM 25 MG: 25 TABLET, FILM COATED ORAL at 14:47

## 2023-04-14 RX ADMIN — DOCUSATE SODIUM 50 MG AND SENNOSIDES 8.6 MG 2 TABLET: 8.6; 5 TABLET, FILM COATED ORAL at 08:00

## 2023-04-14 RX ADMIN — DRONEDARONE 400 MG: 400 TABLET, FILM COATED ORAL at 20:56

## 2023-04-14 RX ADMIN — CHOLECALCIFEROL TAB 125 MCG (5000 UNIT) 5000 UNITS: 125 TAB at 21:12

## 2023-04-14 RX ADMIN — DOCUSATE SODIUM 50 MG AND SENNOSIDES 8.6 MG 2 TABLET: 8.6; 5 TABLET, FILM COATED ORAL at 20:56

## 2023-04-14 RX ADMIN — HEPARIN SODIUM 5000 UNITS: 5000 INJECTION INTRAVENOUS; SUBCUTANEOUS at 11:01

## 2023-04-14 RX ADMIN — METOPROLOL SUCCINATE 25 MG: 25 TABLET, FILM COATED, EXTENDED RELEASE ORAL at 20:55

## 2023-04-14 RX ADMIN — VANCOMYCIN HYDROCHLORIDE 2000 MG: 10 INJECTION, POWDER, LYOPHILIZED, FOR SOLUTION INTRAVENOUS at 11:26

## 2023-04-14 RX ADMIN — METFORMIN HYDROCHLORIDE 1000 MG: 500 TABLET, FILM COATED ORAL at 18:03

## 2023-04-14 RX ADMIN — CETIRIZINE HYDROCHLORIDE 10 MG: 10 TABLET, FILM COATED ORAL at 14:47

## 2023-04-14 RX ADMIN — AMPICILLIN SODIUM AND SULBACTAM SODIUM 3 G: 2; 1 INJECTION, POWDER, FOR SOLUTION INTRAMUSCULAR; INTRAVENOUS at 14:48

## 2023-04-14 RX ADMIN — OXYCODONE HYDROCHLORIDE AND ACETAMINOPHEN 1000 MG: 500 TABLET ORAL at 20:56

## 2023-04-14 RX ADMIN — Medication 400 MG: at 20:55

## 2023-04-14 RX ADMIN — ACETAMINOPHEN 650 MG: 325 TABLET ORAL at 23:31

## 2023-04-14 RX ADMIN — HEPARIN SODIUM 5000 UNITS: 5000 INJECTION INTRAVENOUS; SUBCUTANEOUS at 20:56

## 2023-04-14 RX ADMIN — Medication 10 ML: at 20:57

## 2023-04-14 RX ADMIN — ACETAMINOPHEN 650 MG: 325 TABLET ORAL at 11:01

## 2023-04-14 RX ADMIN — Medication 10 ML: at 08:00

## 2023-04-14 RX ADMIN — ACETAMINOPHEN 650 MG: 325 TABLET ORAL at 18:03

## 2023-04-14 RX ADMIN — SODIUM CHLORIDE 100 ML/HR: 9 INJECTION, SOLUTION INTRAVENOUS at 11:26

## 2023-04-14 RX ADMIN — VANCOMYCIN HYDROCHLORIDE 2000 MG: 10 INJECTION, POWDER, LYOPHILIZED, FOR SOLUTION INTRAVENOUS at 20:57

## 2023-04-14 RX ADMIN — AMPICILLIN SODIUM AND SULBACTAM SODIUM 3 G: 2; 1 INJECTION, POWDER, FOR SOLUTION INTRAMUSCULAR; INTRAVENOUS at 18:03

## 2023-04-14 NOTE — DISCHARGE PLACEMENT REQUEST
"Amee Lisa (63 y.o. Male)     Date of Birth   1960    Social Security Number       Address   [address] [place] [address] [place] [address]    Home Phone   [phone]    MRN   [id number]       Buddhist   Jew    Marital Status                               Admission Date   4/13/23    Admission Type   Elective    Admitting Provider   Marco A Thompson DPM    Attending Provider   Marco A Thompson DPM    Department, Room/Bed   [address], [address]       Discharge Date       Discharge Disposition       Discharge Destination                               Attending Provider: Marco A Thompson DPM    Allergies: Januvia [Sitagliptin], Lipitor [Atorvastatin], Shellfish Allergy, Sulfa Antibiotics    Isolation: None   Infection: None   Code Status: Prior    Ht: 193 cm (76\")   Wt: 170 kg (375 lb)    Admission Cmt: None   Principal Problem: Cellulitis of left foot [L03.116]                 Active Insurance as of 4/13/2023     Primary Coverage     Payor Plan Insurance Group Employer/Plan Group    MEDICARE MEDICARE A & B      Payor Plan Address Payor Plan Phone Number Payor Plan Fax Number Effective Dates    [address] [phone]  12/1/2012 - None Entered    [facility] [address]       Subscriber Name Subscriber Birth Date Member ID       AMEE LISA 1960 [id number]           Secondary Coverage     Payor Plan Insurance Group Employer/Plan Group    [employer]ET[employer] COMMERCIAL AETNA [id number]     Payor Plan Address Payor Plan Phone Number Payor Plan Fax Number Effective Dates    [address] [address]   1/1/2018 - None Entered    [facility] [id number]       Subscriber Name Subscriber Birth Date Member ID       AMEE LISA 1960 [id number]                 Emergency Contacts      (Rel.) Home Phone Work Phone Mobile Phone    Reymundo Lisa (Spouse) [phone] -- [phone]              "

## 2023-04-14 NOTE — PLAN OF CARE
Goal Outcome Evaluation:  Plan of Care Reviewed With: patient, spouse           Outcome Evaluation: OT eval completed. Co-eval with Pt. Pt supine in bed upon arrival. VSS. No pain LLE. Pt is NWB LLE with external fixator. Pt with 7/10 LUE pain and reports his R shoulder has a torn rotator cuff and needs repair in the future. Pt was Max A x2 for sup to sit with another person managing his LLE. Pt was SBA sitting EOB. STS attempted with pt unable to the first try, and only partial STS completed the second try with FWW, Max A x2, and one person managing his LLE. Pt was Max A x2 for returning to sup and dependent for scooting up in bed.Pt was Max A x2 for rolling R and L to change bed linens. Pt would benefit from skilled I/P OT, focusing on BUE strengthing, ADL t/fs, as well as (I) and safety with ADLs and mobility. Goals established. Pt is currently unsafe to d/c home, and would benefit from IRF prior to d/c home.

## 2023-04-14 NOTE — BRIEF OP NOTE
ANKLE ARTHRODESIS WITH ILIAC BONE GRAFT  Progress Note    Emre Lisa     4/13/2023    Pre-op Diagnosis:   Cellulitis of left foot [L03.116]  Charcot's joint of left foot [M14.672]  Type 2 diabetes mellitus with foot ulcer, unspecified whether long term insulin use ([facility]) [E11.621, L97.509]       Post-Op Diagnosis Codes:     * Cellulitis of left foot [L03.116]     * Charcot's joint of left foot [M14.672]     * Type 2 diabetes mellitus with foot ulcer, unspecified whether long term insulin use [E11.621, L97.509]    Procedure/CPT® Codes:        Procedure(s):  REDUCTION OF LEFT ANKLE DEFORMITY WITH APPLICATION OF EXTERNAL FIXATOR        Surgeon(s):  Marco A Thompson DPM    Anesthesia: General    Staff:   Circulator: Fitz Harden, EDY; Nedra Mckay RN; Kranthi Cabrera RN  Radiology Technologist: Kalpana Muñoz  Scrub Person: Rick Recio; Fernandez Castillo; Reena Salcedo  Assistant: Nedra Mcrae MA  Assistant: Nedra Mcrae MA      Estimated Blood Loss: 200ml    Urine Voided: 590 mL    Specimens:                Specimens     ID Source Type Tests Collected By Collected At Frozen?    A Leg, Left Bone · TISSUE EXAM, [facility] ([facility])   Marco A Thompson DPM 4/13/23 1652 No    Description: LEFT FIBULA    This specimen was not marked as sent.                Drains:   Open Drain Left Other (Comment) (Active)       Urethral Catheter Silicone 16 Fr. (Active)       Findings: consistent with pre-op dx    Complications: none     Assistant: Nedra Mcrae MA  was responsible for performing the following activities: Retraction, Suction, Irrigation and Placing Dressing and their skilled assistance was necessary for the success of this case.    Macro A Thompson DPM     Date: 4/13/2023  Time: 19:42 CDT

## 2023-04-14 NOTE — CONSULTS
[facility] Medicine Consult  Consults    Date of Admission: 4/13/2023  Date of Consult: 04/14/23    Primary Care Physician: Jamaal Bravo MD  Referring Physician:  Marco A Thompson DPM      Chief Complaint/Reason for Consultation: Medication management    HPI: Patient is a 63-year-old male with past medical history notable for type 2 diabetes mellitus, hypertension, CAD, and neuropathy who is admitted to podiatry service after undergoing repair of Charcot deformity of his left foot.  Hospital service was consulted for medication management and review.  During my evaluation patient denies any new or current concerns.  He notes that he has arthritic pain in his left shoulder and right hip but denies any pain in the left foot where he had the surgery.  He also states that he has a rotator cuff tear on the right side.  Patient otherwise reports feeling well.    Past Medical History:  has a past medical history of Arthritis, Atrial fibrillation, Diabetes mellitus, Diabetic foot ulcer associated with type 2 diabetes mellitus, Hallux malleus of left foot, Hammer toe, Hypertension, MI (myocardial infarction), Neuropathy in diabetes, Onychomycosis, Presence of biventricular cardiac pacemaker, and Rheumatoid arthritis.    Past Surgical History:  has a past surgical history that includes Toe amputation (Right, 2016); Carpal tunnel release (2015); Tonsilectomy, adenoidectomy, bilateral myringotomy and tubes; Foot Wound Closure (Right, 3/2/2017); Finger amputation (Right, 3/5/2017); Cardiac defibrillator placement (2010, 2016); Hammer Toe Repair (Left, 2/15/2018); incision and drainage leg (Left, 3/5/2018); Hardware Removal (Left, 3/5/2018); Foot Irrigation, Debridement and Repair (Left, 3/7/2018); Foot Irrigation, Debridement and Repair (Left, 3/10/2018); and Plantar fascia release (Left, 11/21/2019).    Family History: family history includes Diabetes in his father;  Heart disease in an other family member; Hypertension in an other family member; No Known Problems in his daughter, daughter, maternal grandfather, maternal grandmother, paternal grandfather, paternal grandmother, and son; Stroke in his father.  No changes    Social History:  reports that he has never smoked. He has never used smokeless tobacco. He reports current alcohol use. He reports that he does not use drugs.    Allergies:   Allergies   Allergen Reactions   • Januvia [Sitagliptin] Hives   • Lipitor [Atorvastatin] Other (See Comments)     Muscle Cramps...   • Shellfish Allergy Other (See Comments)   • Sulfa Antibiotics Rash       Medications: Scheduled Meds:[START ON 4/15/2023] !Vancomycin Level Draw Needed, , Does not apply, Once  ampicillin-sulbactam, 3 g, Intravenous, Q6H  ascorbic acid, 1,000 mg, Oral, Nightly  cetirizine, 10 mg, Oral, Daily  dronedarone, 400 mg, Oral, Nightly  [START ON 4/15/2023] glipizide, 10 mg, Oral, QAM AC  heparin (porcine), 5,000 Units, Subcutaneous, Q8H  losartan, 25 mg, Oral, Daily  magnesium oxide, 400 mg, Oral, BID  metFORMIN, 1,000 mg, Oral, BID With Meals  metoprolol succinate XL, 25 mg, Oral, BID  senna-docusate sodium, 2 tablet, Oral, BID  sodium chloride, 10 mL, Intravenous, Q12H  vancomycin, 2,000 mg, Intravenous, Q12H  vitamin D3, 5,000 Units, Oral, Nightly      Continuous Infusions:Pharmacy to dose vancomycin,   sodium chloride, 100 mL/hr, Last Rate: 100 mL/hr (04/14/23 1126)      PRN Meds:.•  acetaminophen  •  senna-docusate sodium **AND** polyethylene glycol **AND** bisacodyl **AND** bisacodyl  •  dextrose  •  dextrose  •  fluticasone  •  glucagon (human recombinant)  •  HYDROmorphone **AND** naloxone  •  ondansetron  •  oxyCODONE-acetaminophen  •  Pharmacy to dose vancomycin  •  sodium chloride  •  sodium chloride    Review of Systems:  Review of Systems   Otherwise complete ROS is negative except as mentioned above.    Physical Exam:   Temp:  [97.3 °F (36.3 °C)-100.7  °F (38.2 °C)] 100.7 °F (38.2 °C)  Heart Rate:  [] 99  Resp:  [14-24] 18  BP: (109-132)/(57-73) 132/62  FiO2 (%):  [60 %] 60 %  Physical Exam  Constitutional:       General: He is not in acute distress.     Appearance: He is not toxic-appearing.   HENT:      Head: Normocephalic and atraumatic.      Right Ear: External ear normal.      Left Ear: External ear normal.      Nose: Nose normal.      Mouth/Throat:      Pharynx: Oropharynx is clear.   Eyes:      Conjunctiva/sclera: Conjunctivae normal.   Cardiovascular:      Rate and Rhythm: Normal rate and regular rhythm.      Heart sounds: Normal heart sounds.   Pulmonary:      Effort: Pulmonary effort is normal. No respiratory distress.      Breath sounds: Normal breath sounds.   Abdominal:      General: Bowel sounds are normal.      Palpations: Abdomen is soft.      Tenderness: There is no abdominal tenderness.   Musculoskeletal:         General: Deformity present.      Comments: External fixator on left foot noted.   Skin:     General: Skin is warm and dry.      Capillary Refill: Capillary refill takes less than 2 seconds.   Neurological:      General: No focal deficit present.      Mental Status: He is alert and oriented to person, place, and time. Mental status is at baseline.   Psychiatric:         Behavior: Behavior normal.           Results Reviewed:  I have personally reviewed current lab, radiology, and data and agree with results.  Lab Results (last 24 hours)     Procedure Component Value Units Date/Time    Basic Metabolic Panel [[id number]]  (Abnormal) Collected: 04/14/23 0627    Specimen: Blood Updated: 04/14/23 0733     Glucose 172 mg/dL      BUN 23 mg/dL      Creatinine 1.13 mg/dL      Sodium 137 mmol/L      Potassium 4.9 mmol/L      Chloride 101 mmol/L      CO2 25.0 mmol/L      Calcium 8.1 mg/dL      BUN/Creatinine Ratio 20.4     Anion Gap 11.0 mmol/L      eGFR 73.9 mL/min/1.73     Narrative:      GFR Normal >60  Chronic Kidney Disease <60  Kidney  Failure <15      CBC & Differential [[id number]]  (Abnormal) Collected: 04/14/23 0627    Specimen: Blood Updated: 04/14/23 0714    Narrative:      The following orders were created for panel order CBC & Differential.  Procedure                               Abnormality         Status                     ---------                               -----------         ------                     CBC Auto Differential[[id number]]        Abnormal            Final result                 Please view results for these tests on the individual orders.    CBC Auto Differential [[id number]]  (Abnormal) Collected: 04/14/23 0627    Specimen: Blood Updated: 04/14/23 0714     WBC 7.71 10*3/mm3      RBC 3.54 10*6/mm3      Hemoglobin 10.7 g/dL      Hematocrit 32.7 %      MCV 92.4 fL      MCH 30.2 pg      MCHC 32.7 g/dL      RDW 13.9 %      RDW-SD 46.6 fl      MPV 10.8 fL      Platelets 139 10*3/mm3      Neutrophil % 76.9 %      Lymphocyte % 11.0 %      Monocyte % 11.4 %      Eosinophil % 0.0 %      Basophil % 0.3 %      Immature Grans % 0.4 %      Neutrophils, Absolute 5.93 10*3/mm3      Lymphocytes, Absolute 0.85 10*3/mm3      Monocytes, Absolute 0.88 10*3/mm3      Eosinophils, Absolute 0.00 10*3/mm3      Basophils, Absolute 0.02 10*3/mm3      Immature Grans, Absolute 0.03 10*3/mm3      nRBC 0.0 /100 WBC     Basic Metabolic Panel [[id number]]  (Abnormal) Collected: 04/13/23 2146    Specimen: Blood Updated: 04/13/23 2216     Glucose 220 mg/dL      BUN 28 mg/dL      Creatinine 1.34 mg/dL      Sodium 136 mmol/L      Potassium 5.0 mmol/L      Chloride 101 mmol/L      CO2 19.0 mmol/L      Calcium 8.1 mg/dL      BUN/Creatinine Ratio 20.9     Anion Gap 16.0 mmol/L      eGFR 60.3 mL/min/1.73     Narrative:      GFR Normal >60  Chronic Kidney Disease <60  Kidney Failure <15      CBC & Differential [[id number]]  (Abnormal) Collected: 04/13/23 2146    Specimen: Blood Updated: 04/13/23 2202    Narrative:      The following orders were created for panel  order CBC & Differential.  Procedure                               Abnormality         Status                     ---------                               -----------         ------                     CBC Auto Differential[[id number]]        Abnormal            Final result                 Please view results for these tests on the individual orders.    CBC Auto Differential [[id number]]  (Abnormal) Collected: 04/13/23 2146    Specimen: Blood Updated: 04/13/23 2202     WBC 8.99 10*3/mm3      RBC 4.11 10*6/mm3      Hemoglobin 12.5 g/dL      Hematocrit 38.0 %      MCV 92.5 fL      MCH 30.4 pg      MCHC 32.9 g/dL      RDW 14.0 %      RDW-SD 47.5 fl      MPV 10.3 fL      Platelets 132 10*3/mm3      Neutrophil % 86.4 %      Lymphocyte % 6.6 %      Monocyte % 6.1 %      Eosinophil % 0.1 %      Basophil % 0.2 %      Immature Grans % 0.6 %      Neutrophils, Absolute 7.77 10*3/mm3      Lymphocytes, Absolute 0.59 10*3/mm3      Monocytes, Absolute 0.55 10*3/mm3      Eosinophils, Absolute 0.01 10*3/mm3      Basophils, Absolute 0.02 10*3/mm3      Immature Grans, Absolute 0.05 10*3/mm3      nRBC 0.0 /100 WBC     TISSUE EXAM, [facility] ([facility]) [[id number]] Collected: 04/13/23 1652    Specimen: Bone from Leg, Left Updated: 04/13/23 2116    POC Glucose Once [[id number]]  (Abnormal) Collected: 04/13/23 1952    Specimen: Blood Updated: 04/13/23 2004     Glucose 220 mg/dL      Comment: RN NotifiedOperator: [id number] EMMANUEL Almeida ID: [id number]           Imaging Results (Last 24 Hours)     Procedure Component Value Units Date/Time    FL C Arm During Surgery [[id number]] Resulted: 04/14/23 0727     Updated: 04/14/23 0727          Assessment:  Active Hospital Problems    Diagnosis    • **Cellulitis of left foot    • Charcot's joint of left foot    • Type 2 diabetes mellitus with skin complication              Recommendations:    Medical Decision Making  Number and Complexity of problems: 3 high complexity    Conditions and Status:         Condition is improving.     [facility] Data  External documents reviewed: Previous records  Tests considered but not ordered: None     Decision rules/scores evaluated (example [id number], Wells, etc): None     Discussed with: Patient     Treatment Plan  -Agree with current management  - Continue antibiotics per podiatry recommendation  - Continue medications as ordered  - Added and some glucose checks to ensure adequate glycemic control and if needed we can add a low-dose sliding scale    Care Planning  Shared decision making: Patient  Code status and discussions: Full    Disposition  Social Determinants of Health that impact treatment or disposition: None  I expect the patient to be discharged to LTAC versus rehab.       I discussed the patient's findings and my recommendations with: The patient    I would like to thank Dr. Thompson for involving us in the care of Emre Lisa.  We will continue to follow while he is hospitalized.    Regis Lorenzo MD

## 2023-04-14 NOTE — THERAPY EVALUATION
Patient Name: Emre Lisa  : 1960    MRN: [id number]                              Today's Date: 2023       Admit Date: 2023    Visit Dx:     ICD-10-CM ICD-9-CM   1. Cellulitis of left foot  L03.116 682.7   2. Charcot's joint of left foot  M14.672 094.0     713.5   3. Type 2 diabetes mellitus with foot ulcer, unspecified whether long term insulin use  E11.621 250.80    L97.509 707.15   4. Impaired functional mobility, balance, gait, and endurance  Z74.09 V49.89   5. Impaired mobility and ADLs  Z74.09 V49.89    Z78.9      Patient Active Problem List   Diagnosis   • Foot osteomyelitis, right   • Nodule of groin   • Type 2 diabetes mellitus with skin complication   • Status post transmetatarsal amputation of right foot   • Hammer toe of left foot   • Hallux malleus of left foot   • Cellulitis of left foot   • Infected hardware in left leg   • Chronic multifocal osteomyelitis of left foot   • Charcot's joint of left foot   • Skin ulcer of left foot, limited to breakdown of skin     Past Medical History:   Diagnosis Date   • Arthritis    • Atrial fibrillation    • Diabetes mellitus    • Diabetic foot ulcer associated with type 2 diabetes mellitus     left great toe   • Hallux malleus of left foot    • Hammer toe    • Hypertension    • MI (myocardial infarction)    • Neuropathy in diabetes    • Onychomycosis    • Presence of biventricular cardiac pacemaker    • Rheumatoid arthritis      Past Surgical History:   Procedure Laterality Date   • AMPUTATION DIGIT Right 3/5/2017    Procedure: RIGHT AMPUTATION TRANSMETATRASAL , RECESSION GASTROCNEMOUS;  Surgeon: Marco A Thompson DPM;  Location: [facility];  Service:    • CARDIAC DEFIBRILLATOR PLACEMENT  ,    • CARPAL TUNNEL RELEASE  2015    elbow and wrist left arm   • FOOT IRRIGATION, DEBRIDEMENT AND REPAIR Left 3/7/2018    Procedure: FOOT IRRIGATION, DEBRIDEMENT AND REPAIR;  Surgeon: Marco A Thompson DPM;  Location: [facility];  Service:    • FOOT  IRRIGATION, DEBRIDEMENT AND REPAIR Left 3/10/2018    Procedure: FOOT IRRIGATION, DEBRIDEMENT AND REPAIR;  Surgeon: Marco A Thompson DPM;  Location: [facility] OR;  Service: Podiatry   • FOOT WOUND CLOSURE Right 3/2/2017    Procedure: INCISION AND DRAINAGE, RIGHT FOOT;  Surgeon: Tung Rosenthal DPM;  Location: [facility] OR;  Service:    • HAMMER TOE REPAIR Left 2/15/2018    Procedure: HAMMERTOE CORRECTION LEFT SECOND, THIRD AND FOURTH TOES AND HALLUX INTERPHALANGEAL JOINT ARTHRODESIS LEFT FOOT (Micro Asnis, 4.0 & 5.0 Asnis)      (c-arm);  Surgeon: Marco A Thompson DPM;  Location: [facility] OR;  Service:    • HARDWARE REMOVAL Left 3/5/2018    Procedure: ANKLE/FOOT HARDWARE REMOVAL;  Surgeon: Marco A Thompson DPM;  Location: [facility] OR;  Service:    • INCISION AND DRAINAGE LEG Left 3/5/2018    Procedure: INCISION AND DRAINAGE LOWER EXTREMITY;  Surgeon: Marco A Thompson DPM;  Location: [facility] OR;  Service:    • PLANTAR FASCIA RELEASE Left 11/21/2019    Procedure: PLANTAR PLANING LEFT FOOT AND ALL OTHER INDICATED PROCEDURES;  Surgeon: Marco A Thompson DPM;  Location: [facility] OR;  Service: Podiatry   • TOE AMPUTATION Right 2016   • TONSILECTOMY, ADENOIDECTOMY, BILATERAL MYRINGOTOMY AND TUBES      age 25      General Information     Row Name 04/14/23 1133          OT Time and Intention    Document Type evaluation  -CM     Mode of Treatment co-treatment;physical therapy;occupational therapy  -CM     Row Name 04/14/23 1133          General Information    Patient Profile Reviewed yes  -CM     Prior Level of Function independent:;all household mobility;community mobility;ADL's;gait;work;driving  -CM     Existing Precautions/Restrictions fall;non-weight bearing;left  -CM     Barriers to Rehab medically complex  -CM     Row Name 04/14/23 1133          Living Environment    People in Home spouse;child(ambrosio), adult  -CM     Row Name 04/14/23 1133          Home Main Entrance    Number of Stairs, Main Entrance three  -CM     Stair Railings, Main  Entrance none  -CM     Row Name 04/14/23 1133          Stairs Within Home, Primary    Number of Stairs, Within Home, Primary three  -CM     Stair Railings, Within Home, Primary none  -CM     Stairs Comment, Within Home, Primary Bedroom upstairs but patient has been sleeping in lift chair after he injured his ankle initially. Walk-in shower no seat or grab bars. Has a FWW at home and a knee scooter at home. Has a very small bathroom, does not have a BSC.  -CM     Row Name 04/14/23 1133          Cognition    Orientation Status (Cognition) oriented x 4  -CM     Row Name 04/14/23 1133          Safety Issues, Functional Mobility    Safety Issues Affecting Function (Mobility) judgment;positioning of assistive device;sequencing abilities  -CM     Impairments Affecting Function (Mobility) pain;strength  -CM           User Key  (r) = Recorded By, (t) = Taken By, (c) = Cosigned By    Initials Name Provider Type    Sameera Morrow OT Occupational Therapist                 Mobility/ADL's     Row Name 04/14/23 1133          Bed Mobility    Bed Mobility rolling left;rolling right;supine-sit;sit-supine  -CM     Rolling Left [place] (Bed Mobility) maximum assist (25% patient effort);2 person assist  -CM     Rolling Right [place] (Bed Mobility) maximum assist (25% patient effort);2 person assist  -CM     Supine-Sit [place] (Bed Mobility) maximum assist (25% patient effort);2 person assist  -CM     Sit-Supine [place] (Bed Mobility) maximum assist (25% patient effort);2 person assist  -CM     Assistive Device (Bed Mobility) bed rails;head of bed elevated  -CM     Row Name 04/14/23 1133          Transfers    Transfers sit-stand transfer;stand-sit transfer  -CM     Row Name 04/14/23 1133          Sit-Stand Transfer    Sit-Stand [place] (Transfers) maximum assist (25% patient effort);2 person assist;1 person to manage equipment  -CM     Assistive Device (Sit-Stand Transfers) walker, front-wheeled  -CM      Comment, (Sit-Stand Transfer) Only partial STS completed  -CM     Row Name 04/14/23 1133          Mobility    Extremity Weight-bearing Status left lower extremity  -CM     Left Lower Extremity (Weight-bearing Status) non weight-bearing (NWB)  -CM           User Key  (r) = Recorded By, (t) = Taken By, (c) = Cosigned By    Initials Name Provider Type    Sameera Morrow OT Occupational Therapist               Obj/Interventions     Row Name 04/14/23 1133          Sensory Assessment (Somatosensory)    Sensory Assessment (Somatosensory) UE sensation intact  -CM     Row Name 04/14/23 1133          Range of Motion Comprehensive    General Range of Motion bilateral upper extremity ROM WFL  -CM     Row Name 04/14/23 1133          Strength Comprehensive (MMT)    Comment, General Manual Muscle Testing (MMT) Assessment L shoulder NT d/t pt with pain in full ROM. Pt reports he believes his L shoulder has arthritis or is overworked. Pts R shoudler 3+/5 with pt reporting he has a torn rotator cuff needing repair in the future. B elbows and  4/5.  -CM           User Key  (r) = Recorded By, (t) = Taken By, (c) = Cosigned By    Initials Name Provider Type    Sameera Morrow, OT Occupational Therapist               Goals/Plan     Row Name 04/14/23 1133          Transfer Goal 1 (OT)    Activity/Assistive Device (Transfer Goal 1, OT) sit-to-stand/stand-to-sit;bed-to-chair/chair-to-bed;commode, bedside with drop arms  -CM     [place] Level/Cues Needed (Transfer Goal 1, OT) moderate assist (50-74% patient effort)  -CM     Time Frame (Transfer Goal 1, OT) long term goal (LTG);by discharge  -CM     Progress/Outcome (Transfer Goal 1, OT) goal not met  -CM     Row Name 04/14/23 1133          Bathing Goal 1 (OT)    Activity/Device (Bathing Goal 1, OT) lower body bathing  -CM     [place] Level/Cues Needed (Bathing Goal 1, OT) moderate assist (50-74% patient effort)  -CM     Time Frame (Bathing Goal 1, OT) long term goal (LTG);by  discharge  -CM     Progress/Outcomes (Bathing Goal 1, OT) goal not met  -CM     Row Name 04/14/23 1133          Dressing Goal 1 (OT)    Activity/Device (Dressing Goal 1, OT) upper body dressing  -CM     [place]/Cues Needed (Dressing Goal 1, OT) set-up required  -CM     Time Frame (Dressing Goal 1, OT) long term goal (LTG);by discharge  -CM     Progress/Outcome (Dressing Goal 1, OT) goal not met  -CM     Row Name 04/14/23 1133          Toileting Goal 1 (OT)    Activity/Device (Toileting Goal 1, OT) toileting skills, all;adjust/manage clothing;perform perineal hygiene;commode, 3-in-1  -CM     [place] Level/Cues Needed (Toileting Goal 1, OT) minimum assist (75% or more patient effort)  -CM     Time Frame (Toileting Goal 1, OT) long term goal (LTG);by discharge  -CM     Progress/Outcome (Toileting Goal 1, OT) goal not met  -CM     Row Name 04/14/23 1133          Therapy Assessment/Plan (OT)    Planned Therapy Interventions (OT) activity tolerance training;adaptive equipment training;BADL retraining;cognitive/visual perception retraining;manual therapy/joint mobilization;IADL retraining;functional balance retraining;edema control/reduction;neuromuscular control/coordination retraining;occupation/activity based interventions;ROM/therapeutic exercise;patient/caregiver education/training;passive ROM/stretching;strengthening exercise;transfer/mobility retraining  -CM           User Key  (r) = Recorded By, (t) = Taken By, (c) = Cosigned By    Initials Name Provider Type    Sameera Morrow OT Occupational Therapist               Clinical Impression     Row Name 04/14/23 1133          Pain Assessment    Pretreatment Pain Rating 7/10  -CM     Posttreatment Pain Rating 4/10  -CM     Pain Location - Side/Orientation Left  -CM     Pain Location - shoulder  -CM     Pain Intervention(s) Repositioned;Ambulation/increased activity  -CM     Row Name 04/14/23 1133          Plan of Care Review    Plan of Care Reviewed With  patient;spouse  -CM     Outcome Evaluation OT eval completed. Co-eval with Pt. Pt supine in bed upon arrival. VSS. No pain LLE. Pt is NWB LLE with external fixator. Pt with 7/10 LUE pain and reports his R shoulder has a torn rotator cuff and needs repair in the future. Pt was Max A x2 for sup to sit with another person managing his LLE. Pt was SBA sitting EOB. STS attempted with pt unable to the first try, and only partial STS completed the second try with FWW, Max A x2, and one person managing his LLE. Pt was Max A x2 for returning to sup and dependent for scooting up in bed.Pt was Max A x2 for rolling R and L to change bed linens. Pt would benefit from skilled I/P OT, focusing on BUE strengthing, ADL t/fs, as well as (I) and safety with ADLs and mobility. Goals established. Pt is currently unsafe to d/c home, and would benefit from IRF prior to d/c home.  -CM     Row Name 04/14/23 1133          Therapy Assessment/Plan (OT)    Patient/Family Therapy Goal Statement (OT) go to rehab prior to d/c home  -CM     Rehab Potential (OT) good, to achieve stated therapy goals  -CM     Criteria for Skilled Therapeutic Interventions Met (OT) yes;meets criteria  -CM     Therapy Frequency (OT) daily  -CM     Predicted Duration of Therapy Intervention (OT) until d/c or all goals met  -     Row Name 04/14/23 1133          Therapy Plan Review/Discharge Plan (OT)    Anticipated Discharge Disposition (OT) inpatient rehabilitation facility  -CM     Row Name 04/14/23 1133          Vital Signs    Pre Systolic BP Rehab 129  -CM     Pre Treatment Diastolic BP 76  -CM     Pretreatment Heart Rate (beats/min) 100  -CM     Pre SpO2 (%) 96  -CM     O2 Delivery Pre Treatment room air  -CM     Pre Patient Position Supine  -CM     Row Name 04/14/23 1133          Positioning and Restraints    Pre-Treatment Position in bed  -CM     Post Treatment Position bed  -CM     In Bed notified nsg;supine;call light within reach;encouraged to call for  assist;exit alarm on;with family/caregiver;side rails up x2  -CM           User Key  (r) = Recorded By, (t) = Taken By, (c) = Cosigned By    Initials Name Provider Type    Sameera Morrow OT Occupational Therapist               Outcome Measures     Row Name 04/14/23 1133          How much help from another is currently needed...    Putting on and taking off regular lower body clothing? 1  -CM     Bathing (including washing, rinsing, and drying) 2  -CM     Toileting (which includes using toilet bed pan or urinal) 1  -CM     Putting on and taking off regular upper body clothing 3  -CM     Taking care of personal grooming (such as brushing teeth) 4  -CM     Eating meals 4  -CM     AM-PAC 6 Clicks Score (OT) 15  -CM     Row Name 04/14/23 1132 04/14/23 0800       How much help from another person do you currently need...    Turning from your back to your side while in flat bed without using bedrails? 2  -HM 2  -HE    Moving from lying on back to sitting on the side of a flat bed without bedrails? 2  -HM 2  -HE    Moving to and from a bed to a chair (including a wheelchair)? 1  -HM 2  -HE    Standing up from a chair using your arms (e.g., wheelchair, bedside chair)? 1  -HM 1  -HE    Climbing 3-5 steps with a railing? 1  -HM 1  -HE    To walk in hospital room? 1  -HM 1  -HE    AM-PAC 6 Clicks Score (PT) 8  -HM 9  -HE    Highest level of mobility 3 --> Sat at edge of bed  -HM 3 --> Sat at edge of bed  -HE    Row Name 04/14/23 1133 04/14/23 1132       Functional Assessment    Outcome Measure Options AM-PAC 6 Clicks Daily Activity (OT)  -CM AM-PAC 6 Clicks Basic Mobility (PT)  -HM          User Key  (r) = Recorded By, (t) = Taken By, (c) = Cosigned By    Initials Name Provider Type    Maryann Reed, RN Registered Nurse    Sameera Morrow OT Occupational Therapist    Yanet Parker, PT Physical Therapist                Occupational Therapy Education     Title: PT OT SLP Therapies (In Progress)     Topic:  Occupational Therapy (In Progress)     Point: ADL training (Done)     Description:   Instruct learner(s) on proper safety adaptation and remediation techniques during self care or transfers.   Instruct in proper use of assistive devices.              Learning Progress Summary           Patient Acceptance, E,TB, VU by CM at 4/14/2023 1411    Comment: OT POC, role of OT, d/c recommendations, NWB status   Family Acceptance, E,TB, VU by CM at 4/14/2023 1411    Comment: OT POC, role of OT, d/c recommendations, NWB status                   Point: Home exercise program (Not Started)     Description:   Instruct learner(s) on appropriate technique for monitoring, assisting and/or progressing therapeutic exercises/activities.              Learner Progress:  Not documented in this visit.          Point: Precautions (Done)     Description:   Instruct learner(s) on prescribed precautions during self-care and functional transfers.              Learning Progress Summary           Patient Acceptance, E,TB, VU by CM at 4/14/2023 1411    Comment: OT POC, role of OT, d/c recommendations, NWB status   Family Acceptance, E,TB, VU by CM at 4/14/2023 1411    Comment: OT POC, role of OT, d/c recommendations, NWB status                   Point: Body mechanics (Done)     Description:   Instruct learner(s) on proper positioning and spine alignment during self-care, functional mobility activities and/or exercises.              Learning Progress Summary           Patient Acceptance, E,TB, VU by CM at 4/14/2023 1411    Comment: OT POC, role of OT, d/c recommendations, NWB status   Family Acceptance, E,TB, VU by CM at 4/14/2023 1411    Comment: OT POC, role of OT, d/c recommendations, NWB status                               User Key     Initials Effective Dates Name Provider Type Discipline    [place] 11/18/22 -  Sameera Kang OT Occupational Therapist OT              OT Recommendation and Plan  Planned Therapy Interventions (OT): activity tolerance  training, adaptive equipment training, BADL retraining, cognitive/visual perception retraining, manual therapy/joint mobilization, IADL retraining, functional balance retraining, edema control/reduction, neuromuscular control/coordination retraining, occupation/activity based interventions, ROM/therapeutic exercise, patient/caregiver education/training, passive ROM/stretching, strengthening exercise, transfer/mobility retraining  Therapy Frequency (OT): daily  Plan of Care Review  Plan of Care Reviewed With: patient, spouse  Outcome Evaluation: OT eval completed. Co-eval with Pt. Pt supine in bed upon arrival. VSS. No pain LLE. Pt is NWB LLE with external fixator. Pt with 7/10 LUE pain and reports his R shoulder has a torn rotator cuff and needs repair in the future. Pt was Max A x2 for sup to sit with another person managing his LLE. Pt was SBA sitting EOB. STS attempted with pt unable to the first try, and only partial STS completed the second try with FWW, Max A x2, and one person managing his LLE. Pt was Max A x2 for returning to sup and dependent for scooting up in bed.Pt was Max A x2 for rolling R and L to change bed linens. Pt would benefit from skilled I/P OT, focusing on BUE strengthing, ADL t/fs, as well as (I) and safety with ADLs and mobility. Goals established. Pt is currently unsafe to d/c home, and would benefit from IRF prior to d/c home.     Time Calculation:    Time Calculation- OT     Row Name 04/14/23 1412             Time Calculation- OT    OT Start Time 1132  -CM      OT Stop Time 1212  -CM      OT Time Calculation (min) 40 min  -CM      OT Received On 04/14/23  -CM      OT Goal Re-Cert Due Date 04/27/23  -CM         Untimed Charges    OT Eval/Re-eval Minutes 40  -CM         Total Minutes    Untimed Charges Total Minutes 40  -CM       Total Minutes 40  -CM            User Key  (r) = Recorded By, (t) = Taken By, (c) = Cosigned By    Initials Name Provider Type    Sameera Morrow OT  Occupational Therapist              Therapy Charges for Today     Code Description Service Date Service Provider Modifiers Qty    [id number] HC OT EVAL MOD COMPLEXITY 3 4/14/2023 Sameera Kang OT GO 1               Sameera Kang OT  4/14/2023

## 2023-04-14 NOTE — THERAPY EVALUATION
Patient Name: Emre Lisa  : 1960    MRN: [id number]                              Today's Date: 2023       Admit Date: 2023    Visit Dx:     ICD-10-CM ICD-9-CM   1. Cellulitis of left foot  L03.116 682.7   2. Charcot's joint of left foot  M14.672 094.0     713.5   3. Type 2 diabetes mellitus with foot ulcer, unspecified whether long term insulin use  E11.621 250.80    L97.509 707.15   4. Impaired functional mobility, balance, gait, and endurance  Z74.09 V49.89     Patient Active Problem List   Diagnosis   • Foot osteomyelitis, right   • Nodule of groin   • Type 2 diabetes mellitus with skin complication   • Status post transmetatarsal amputation of right foot   • Hammer toe of left foot   • Hallux malleus of left foot   • Cellulitis of left foot   • Infected hardware in left leg   • Chronic multifocal osteomyelitis of left foot   • Charcot's joint of left foot   • Skin ulcer of left foot, limited to breakdown of skin     Past Medical History:   Diagnosis Date   • Arthritis    • Atrial fibrillation    • Diabetes mellitus    • Diabetic foot ulcer associated with type 2 diabetes mellitus     left great toe   • Hallux malleus of left foot    • Hammer toe    • Hypertension    • MI (myocardial infarction)    • Neuropathy in diabetes    • Onychomycosis    • Presence of biventricular cardiac pacemaker    • Rheumatoid arthritis      Past Surgical History:   Procedure Laterality Date   • AMPUTATION DIGIT Right 3/5/2017    Procedure: RIGHT AMPUTATION TRANSMETATRASAL , RECESSION GASTROCNEMOUS;  Surgeon: Marco A Thompson DPM;  Location: [facility];  Service:    • CARDIAC DEFIBRILLATOR PLACEMENT  ,    • CARPAL TUNNEL RELEASE  2015    elbow and wrist left arm   • FOOT IRRIGATION, DEBRIDEMENT AND REPAIR Left 3/7/2018    Procedure: FOOT IRRIGATION, DEBRIDEMENT AND REPAIR;  Surgeon: Marco A Thompson DPM;  Location: [facility];  Service:    • FOOT IRRIGATION, DEBRIDEMENT AND REPAIR Left 3/10/2018     Procedure: FOOT IRRIGATION, DEBRIDEMENT AND REPAIR;  Surgeon: Marco A Thompson DPM;  Location: [facility];  Service: Podiatry   • FOOT WOUND CLOSURE Right 3/2/2017    Procedure: INCISION AND DRAINAGE, RIGHT FOOT;  Surgeon: Tung Rosenthal DPM;  Location: [facility];  Service:    • HAMMER TOE REPAIR Left 2/15/2018    Procedure: HAMMERTOE CORRECTION LEFT SECOND, THIRD AND FOURTH TOES AND HALLUX INTERPHALANGEAL JOINT ARTHRODESIS LEFT FOOT (Micro Asnis, 4.0 & 5.0 Asnis)      (c-arm);  Surgeon: Marco A Thompson DPM;  Location: [facility] OR;  Service:    • HARDWARE REMOVAL Left 3/5/2018    Procedure: ANKLE/FOOT HARDWARE REMOVAL;  Surgeon: Marco A Thompson DPM;  Location: [facility];  Service:    • INCISION AND DRAINAGE LEG Left 3/5/2018    Procedure: INCISION AND DRAINAGE LOWER EXTREMITY;  Surgeon: Marco A Thompson DPM;  Location: [facility];  Service:    • PLANTAR FASCIA RELEASE Left 11/21/2019    Procedure: PLANTAR PLANING LEFT FOOT AND ALL OTHER INDICATED PROCEDURES;  Surgeon: Marco A Thompson DPM;  Location: [facility];  Service: Podiatry   • TOE AMPUTATION Right 2016   • TONSILECTOMY, ADENOIDECTOMY, BILATERAL MYRINGOTOMY AND TUBES      age 25      General Information     Row Name 04/14/23 1132          Physical Therapy Time and Intention    Document Type evaluation  -     Mode of Treatment co-treatment;physical therapy;occupational therapy  -     Row Name 04/14/23 1132          General Information    Patient Profile Reviewed yes  -     Prior Level of Function independent:;all household mobility;community mobility;ADL's;home management;driving;shopping;using stairs;yard work;work  -     Existing Precautions/Restrictions fall;non-weight bearing;left  -     Barriers to Rehab --  -     Row Name 04/14/23 1132          Living Environment    People in Home spouse;child(ambrosio), adult  -     Row Name 04/14/23 1132          Home Main Entrance    Number of Stairs, Main Entrance three  -HM     Stair Railings, Main Entrance  none  -HM     Row Name 04/14/23 1132          Stairs Within Home, Primary    Stairs, Within Home, Primary Bedroom upstairs but patient has been sleeping in lift chair after he injured his ankle initially. Walk-in shower no seat or grab bars. Has a FWW at home and a knee scooter at home. Has a very small bathroom, does not have a BSC.  -HM     Number of Stairs, Within Home, Primary three  -HM     Stair Railings, Within Home, Primary none  -HM     Stairs Comment, Within Home, Primary --  -HM     Row Name 04/14/23 1132          Cognition    Orientation Status (Cognition) oriented x 4  -HM     Row Name 04/14/23 1132          Safety Issues, Functional Mobility    Safety Issues Affecting Function (Mobility) judgment;positioning of assistive device;sequencing abilities  -     Impairments Affecting Function (Mobility) pain;strength  -HM           User Key  (r) = Recorded By, (t) = Taken By, (c) = Cosigned By    Initials Name Provider Type     Yanet Vega PT Physical Therapist               Mobility     Row Name 04/14/23 1132          Bed Mobility    Bed Mobility rolling left;rolling right;supine-sit;sit-supine  -HM     Rolling Left [place] (Bed Mobility) maximum assist (25% patient effort);2 person assist  -HM     Rolling Right [place] (Bed Mobility) maximum assist (25% patient effort);2 person assist  -HM     Supine-Sit [place] (Bed Mobility) maximum assist (25% patient effort);2 person assist  -HM     Sit-Supine [place] (Bed Mobility) maximum assist (25% patient effort);2 person assist  -     Assistive Device (Bed Mobility) bed rails;head of bed elevated  -     Row Name 04/14/23 1132          Sit-Stand Transfer    Sit-Stand [place] (Transfers) maximum assist (25% patient effort);2 person assist;1 person to manage equipment  -     Assistive Device (Sit-Stand Transfers) walker, front-wheeled  -     Comment, (Sit-Stand Transfer) Patient could only do partial STS  -     Row Name  04/14/23 1132          Gait/Stairs (Locomotion)    [place] Level (Gait) not tested  -     Row Name 04/14/23 1132          Mobility    Extremity Weight-bearing Status left lower extremity  -     Left Lower Extremity (Weight-bearing Status) non weight-bearing (NWB)  -           User Key  (r) = Recorded By, (t) = Taken By, (c) = Cosigned By    Initials Name Provider Type     Yanet Vega, PT Physical Therapist               Obj/Interventions     Row Name 04/14/23 1132          Range of Motion Comprehensive    General Range of Motion bilateral lower extremity ROM WFL  -     Comment, General Range of Motion L ankle in external fixator  -     Row Name 04/14/23 1132          Strength Comprehensive (MMT)    General Manual Muscle Testing (MMT) Assessment lower extremity strength deficits identified  -     Comment, General Manual Muscle Testing (MMT) Assessment RLE 5/5; LLE hip flexion 3-/5; others not tested  -     Row Name 04/14/23 1132          Balance    Balance Assessment sitting static balance;standing static balance  -     Static Sitting Balance standby assist  -     Position, Sitting Balance sitting edge of bed  -     Static Standing Balance dependent  -     Position/Device Used, Standing Balance walker, front-wheeled  -     Row Name 04/14/23 1132          Sensory Assessment (Somatosensory)    Sensory Assessment (Somatosensory) right LE;left LE  -HM     Left LE Sensory Assessment light touch awareness;impaired  -HM     Right LE Sensory Assessment intact;light touch awareness  -           User Key  (r) = Recorded By, (t) = Taken By, (c) = Cosigned By    Initials Name Provider Type     Yanet Vega, PT Physical Therapist               Goals/Plan     Row Name 04/14/23 1238          Bed Mobility Goal 1 (PT)    Activity/Assistive Device (Bed Mobility Goal 1, PT) bed mobility activities, all  -HM     [place] Level/Cues Needed (Bed Mobility Goal 1, PT) moderate assist (50-74%  patient effort)  -     Time Frame (Bed Mobility Goal 1, PT) by discharge  -     Strategies/Barriers (Bed Mobility Goal 1, PT) while maintaining WB precautions  -HM     Progress/Outcomes (Bed Mobility Goal 1, PT) new goal;goal not met  -     Row Name 04/14/23 1238          Transfer Goal 1 (PT)    Activity/Assistive Device (Transfer Goal 1, PT) sit-to-stand/stand-to-sit;bed-to-chair/chair-to-bed  -     [place] Level/Cues Needed (Transfer Goal 1, PT) moderate assist (50-74% patient effort)  -     Time Frame (Transfer Goal 1, PT) by discharge  -     Strategies/Barriers (Transfers Goal 1, PT) While maintaining WB precautions  -HM     Progress/Outcome (Transfer Goal 1, PT) new goal;goal not met  -     Row Name 04/14/23 1238          Problem Specific Goal 1 (PT)    Problem Specific Goal 1 (PT) Improve functional LE strength on the R to be able to complete x5 STS w/modA to maxA  -     Time Frame (Problem Specific Goal 1, PT) by discharge  -     Row Name 04/14/23 1234          Therapy Assessment/Plan (PT)    Planned Therapy Interventions (PT) balance training;bed mobility training;gait training;neuromuscular re-education;patient/family education;ROM (range of motion);strengthening;stretching;transfer training;stair training  -           User Key  (r) = Recorded By, (t) = Taken By, (c) = Cosigned By    Initials Name Provider Type     Yanet Vega, PT Physical Therapist               Clinical Impression     Row Name 04/14/23 1131          Pain    Pretreatment Pain Rating 7/10  -     Posttreatment Pain Rating 4/10  -     Pain Location - Side/Orientation Left  -     Pain Location - shoulder  -     Pre/Posttreatment Pain Comment Arthritis/overworked recently due to injury  -     Pain Intervention(s) Ambulation/increased activity;Repositioned  -     Row Name 04/14/23 0134          Plan of Care Review    Plan of Care Reviewed With patient;spouse  -     Outcome Evaluation PT Cain  completed. Co-Eval w/OT. Patient supine in bed, agreeable to evaluation. A&Ox4. Patient is s/p reduction of L ankle deformity w/external fixator and is NWB. He denies pain in the L ankle but reports 7/10 pain in his L shoulder. Since he injured his ankle he has had to overwork his L shoulder due to old R RTC injury. RLE strength is good but patient has LLE strength deficits as expected. Patient required maxAx2 for sup >sit w/heavy assist of lifting LLE. Attempted STS however patient could only complete partial and needed maxAx2 + 1 person to manage LLE. Patient cued to maintain WB precautions and when he couldn't we returned to sitting. He was also limited but difficulty w/full WB through RLE to stand himself up. Patient was maxAx2 to return to supine, for rolling to reposition and change linens and dep for scooting up in bed. Patient will benefit from skilled PT to address strength and mobility deficits, for transfer training etc. to return to [facility]. Due to the amount of assist patient needs and his home set up he is unsafe to return home and would benefit from inpatient rehab for rehab to home.  -     Row Name 04/14/23 1132          Therapy Assessment/Plan (PT)    Rehab Potential (PT) good, to achieve stated therapy goals  -     Criteria for Skilled Interventions Met (PT) yes;meets criteria;skilled treatment is necessary  -     Therapy Frequency (PT) daily  -     Row Name 04/14/23 1132          Vital Signs    Pre Systolic BP Rehab 129  -HM     Pre Treatment Diastolic BP 76  -HM     Pretreatment Heart Rate (beats/min) 100  -HM     Pre SpO2 (%) 96  -HM     O2 Delivery Pre Treatment room air  -HM     Pre Patient Position Supine  -HM     Intra Patient Position Standing  -HM     Post Patient Position Supine  -HM     Row Name 04/14/23 1132          Positioning and Restraints    Pre-Treatment Position in bed  -HM     Post Treatment Position bed  -HM     In Bed notified nsg;fowlers;call light within  reach;encouraged to call for assist;exit alarm on;with family/caregiver;with nsg;LLE elevated  -           User Key  (r) = Recorded By, (t) = Taken By, (c) = Cosigned By    Initials Name Provider Type     Yanet Vega PT Physical Therapist               Outcome Measures     Row Name 04/14/23 1132          How much help from another person do you currently need...    Turning from your back to your side while in flat bed without using bedrails? 2  -HM     Moving from lying on back to sitting on the side of a flat bed without bedrails? 2  -HM     Moving to and from a bed to a chair (including a wheelchair)? 1  -HM     Standing up from a chair using your arms (e.g., wheelchair, bedside chair)? 1  -HM     Climbing 3-5 steps with a railing? 1  -HM     To walk in hospital room? 1  -     AM-PAC 6 Clicks Score (PT) 8  -HM     Highest level of mobility 3 --> Sat at edge of bed  -     Row Name 04/14/23 1132          Functional Assessment    Outcome Measure Options AM-PAC 6 Clicks Basic Mobility (PT)  -           User Key  (r) = Recorded By, (t) = Taken By, (c) = Cosigned By    Initials Name Provider Type     Yanet Vega PT Physical Therapist                             Physical Therapy Education     Title: PT OT SLP Therapies (In Progress)     Topic: Physical Therapy (In Progress)     Point: Mobility training (Done)     Learning Progress Summary           Patient Acceptance, E, VU by  at 4/14/2023 1242    Comment: POC and goals   Significant Other Acceptance, E, VU by  at 4/14/2023 1242    Comment: POC and goals                   Point: Home exercise program (Not Started)     Learner Progress:  Not documented in this visit.          Point: Body mechanics (Not Started)     Learner Progress:  Not documented in this visit.          Point: Precautions (Done)     Learning Progress Summary           Patient Acceptance, E, VU by  at 4/14/2023 1242    Comment: POC and goals   Significant Other  Acceptance, E, VU by  at 4/14/2023 1242    Comment: POC and goals                               User Key     Initials Effective Dates Name Provider Type Discipline     01/09/23 -  Yanet Vega, NESTOR Physical Therapist PT              PT Recommendation and Plan  Planned Therapy Interventions (PT): balance training, bed mobility training, gait training, neuromuscular re-education, patient/family education, ROM (range of motion), strengthening, stretching, transfer training, stair training  Plan of Care Reviewed With: patient, spouse  Outcome Evaluation: PT Eval completed. Co-Eval w/OT. Patient supine in bed, agreeable to evaluation. A&Ox4. Patient is s/p reduction of L ankle deformity w/external fixator and is NWB. He denies pain in the L ankle but reports 7/10 pain in his L shoulder. Since he injured his ankle he has had to overwork his L shoulder due to old R RTC injury. RLE strength is good but patient has LLE strength deficits as expected. Patient required maxAx2 for sup >sit w/heavy assist of lifting LLE. Attempted STS however patient could only complete partial and needed maxAx2 + 1 person to manage LLE. Patient cued to maintain WB precautions and when he couldn't we returned to sitting. He was also limited but difficulty w/full WB through RLE to stand himself up. Patient was maxAx2 to return to supine, for rolling to reposition and change linens and dep for scooting up in bed. Patient will benefit from skilled PT to address strength and mobility deficits, for transfer training etc. to return to [facility]. Due to the amount of assist patient needs and his home set up he is unsafe to return home and would benefit from inpatient rehab for rehab to home.     Time Calculation:    PT Charges     Row Name 04/14/23 1132             Time Calculation    Start Time 1132  -      Stop Time 1212  -      Time Calculation (min) 40 min  -      PT Received On 04/14/23  -      PT Goal Re-Cert Due Date 04/27/23  -          Untimed Charges    PT Eval/Re-eval Minutes 40  -HM         Total Minutes    Untimed Charges Total Minutes 40  -HM       Total Minutes 40  -HM            User Key  (r) = Recorded By, (t) = Taken By, (c) = Cosigned By    Initials Name Provider Type     Yanet Vega, PT Physical Therapist              Therapy Charges for Today     Code Description Service Date Service Provider Modifiers Qty    [id number] HC PT EVAL MOD COMPLEXITY 3 4/14/2023 Yanet Vega, PT GP 1          PT G-Codes  Outcome Measure Options: AM-PAC 6 Clicks Basic Mobility (PT)  AM-PAC 6 Clicks Score (PT): 8  PT Discharge Summary  Anticipated Discharge Disposition (PT): inpatient rehabilitation facility    Yanet Vega PT  4/14/2023

## 2023-04-14 NOTE — PLAN OF CARE
Goal Outcome Evaluation:  Plan of Care Reviewed With: patient        Progress: improving  Outcome Evaluation: VSS. No c/o pain. Purewick in place. Patient unable to void, straight cathed x1. Tolerating clears. LLE is elevated. CMS is intact. IV antibiotics given per orders.

## 2023-04-14 NOTE — PROGRESS NOTES
"Pharmacokinetics by Pharmacy - Vancomycin Initial Consult    Emre Lisa is a 63 y.o. male being initiated on vancomycin for bone and/or joint infection. Patient is also receiving ampicillin-sulbactam.    Objective:     [Ht: 193 cm (76\"); Wt: (!) 170 kg (375 lb)]     WBC   Date Value Ref Range Status   04/14/2023 7.71 3.40 - 10.80 10*3/mm3 Final   04/13/2023 8.99 3.40 - 10.80 10*3/mm3 Final   04/13/2023   Final     Comment:     See scanned report      C-Reactive Protein   Date Value Ref Range Status   04/13/2023 1.26 (H) 0.00 - 0.50 mg/dL Final      Temp Readings from Last 1 Encounters:   04/14/23 97.3 °F (36.3 °C) (Temporal)     Estimated Creatinine Clearance: 116.5 mL/min (by C-G formula based on SCr of 1.13 mg/dL).   Creatinine   Date Value Ref Range Status   04/14/2023 1.13 0.76 - 1.27 mg/dL Final   04/13/2023 1.34 (H) 0.76 - 1.27 mg/dL Final   04/13/2023 1.10 0.76 - 1.27 mg/dL Final       Baseline culture results:  Microbiology Results (last 10 days)     ** No results found for the last 240 hours. **        No results found for: RESPCX    Assessment    WBC 7.71, WNL   Scr 1.13, WNL (baseline Scr~0.8-1)  Patient afebrile     No cultures collected at this time    4/13 tissue pathology, bone left leg: in process    Patient underwent ankle arthrodesis with iliac bone graft yesterday. Cellulitis also noted. A one time loading dose of vancomycin 2250 mg IV was given on 4/13/23 at 2220. Will initiate vancomycin 2000 mg IV Q12H. Vancomycin trough and AUC goals 10-20 and 400-600, respectively. Anticipate accumulation of vancomycin to occur as patient's BMI is 45.7.    Plan  1. Initiate vancomycin 2000 mg IV Q12H starting 4/14 at 1030. Consult ends 5/25/23.  2. Will order vancomycin peak on 4/15 at 0030 and vancomycin trough on 4/15 at 1000  3. Pharmacy will monitor renal function and adjust dose accordingly.    Eloy Mendoza [facility]  04/14/23 08:07 CDT   "

## 2023-04-14 NOTE — PROGRESS NOTES
Podiatric Surgery Progress Note    Subjective     Post-Operative Day: 1 post-left reduction left ankle deformity with application of external fixator  Systemic or Specific Complaints: No Complaints    Objective     Vital signs in last 24 hours:  Temp:  [97.3 °F (36.3 °C)-100.5 °F (38.1 °C)] 98.6 °F (37 °C)  Heart Rate:  [] 60  Resp:  [14-24] 18  BP: (109-132)/(57-73) 132/64  FiO2 (%):  [60 %] 60 %    General: alert, appears stated age and cooperative   Neurovascular:  Sensation left lower extremity absent  Capillary refill: Normal   Wound:  Incision sites are well approximated.  Open wound to lateral ankle.  No signs of infection.  Drain intact with 75 mL output   Range of Motion:  Absent secondary to frame application   DVT Exam: No evidence of DVT seen on physical exam.     Data Review  CBC:  Results from last 7 days   Lab Units 04/14/23  0627   WBC 10*3/mm3 7.71   RBC 10*6/mm3 3.54*   HEMOGLOBIN g/dL 10.7*   HEMATOCRIT % 32.7*   PLATELETS 10*3/mm3 139*       Assessment & Plan     Charcot left ankle    Patient doing well postoperatively.  Dressing change today.  Restart home meds.  Heparin for DVT prophylaxis.  IV antibiotics.  Case management consulted for placement.  Patient would like to go to the LTAC.  Nonweightbearing left lower extremity.  PT/OT consulted.  Call with questions     LOS: 0 days     Marco A Thompson DPM    Date: 4/14/2023  Time: 14:15 CDT

## 2023-04-14 NOTE — PLAN OF CARE
Goal Outcome Evaluation:  Plan of Care Reviewed With: patient        Progress: no change  Outcome Evaluation: Admission complete

## 2023-04-14 NOTE — ANESTHESIA POSTPROCEDURE EVALUATION
"Patient: Emre Lisa    Procedure Summary     Date: 04/13/23 Room / Location: [facility] OR  /  [facility] OR    Anesthesia Start: 1435 Anesthesia Stop: 1956    Procedure: REDUCTION OF LEFT ANKLE DEFORMITY WITH APPLICATION OF EXTERNAL FIXATOR (Left: Ankle) Diagnosis:       Cellulitis of left foot      Charcot's joint of left foot      Type 2 diabetes mellitus with foot ulcer, unspecified whether long term insulin use      (Cellulitis of left foot [L03.116])      (Charcot's joint of left foot [M14.672])      (Type 2 diabetes mellitus with foot ulcer, unspecified whether long term insulin use (HCC) [E11.621, L97.509])    Surgeons: Marco A Thompson DPM Provider: Sincere Silva CRNA    Anesthesia Type: general ASA Status: 3          Anesthesia Type: general    Vitals  Vitals Value Taken Time   /66 04/13/23 1957   Temp 98.2 °F (36.8 °C) 04/13/23 1957   Pulse 99 04/13/23 1957   Resp 24 04/13/23 1957   SpO2 96 % 04/13/23 1957           Post Anesthesia Care and Evaluation    Patient location during evaluation: PACU  Patient participation: complete - patient participated  Level of consciousness: sleepy but conscious  Pain score: 0  Pain management: adequate    Airway patency: patent  Anesthetic complications: No anesthetic complications  PONV Status: none  Cardiovascular status: acceptable and hemodynamically stable  Respiratory status: acceptable and face mask (6 L/min of oxygen )  Hydration status: acceptable    Comments: /66 (BP Location: Right arm)   Pulse 99   Temp 98.2 °F (36.8 °C) (Temporal)   Resp 24   Ht 193 cm (76\")   Wt (!) 166 kg (365 lb)   SpO2 96% Comment: 6 L/min of oxygen via facemask  BMI 44.43 kg/m²         "

## 2023-04-14 NOTE — PLAN OF CARE
Goal Outcome Evaluation:  Plan of Care Reviewed With: patient, spouse           Outcome Evaluation: PT Cain completed. Co-Eval w/OT. Patient supine in bed, agreeable to evaluation. A&Ox4. Patient is s/p reduction of L ankle deformity w/external fixator and is NWB. He denies pain in the L ankle but reports 7/10 pain in his L shoulder. Since he injured his ankle he has had to overwork his L shoulder due to old R RTC injury. RLE strength is good but patient has LLE strength deficits as expected. Patient required maxAx2 for sup >sit w/heavy assist of lifting LLE. Attempted STS however patient could only complete partial and needed maxAx2 + 1 person to manage LLE. Patient cued to maintain WB precautions and when he couldn't we returned to sitting. He was also limited but difficulty w/full WB through RLE to stand himself up. Patient was maxAx2 to return to supine, for rolling to reposition and change linens and dep for scooting up in bed. Patient will benefit from skilled PT to address strength and mobility deficits, for transfer training etc. to return to [facility]. Due to the amount of assist patient needs and his home set up he is unsafe to return home and would benefit from inpatient rehab for rehab to home.

## 2023-04-15 LAB
ANION GAP SERPL CALCULATED.3IONS-SCNC: 9 MMOL/L (ref 5–15)
BASOPHILS # BLD AUTO: 0.05 10*3/MM3 (ref 0–0.2)
BASOPHILS NFR BLD AUTO: 0.8 % (ref 0–1.5)
BUN SERPL-MCNC: 14 MG/DL (ref 8–23)
BUN/CREAT SERPL: 14.9 (ref 7–25)
CALCIUM SPEC-SCNC: 8.3 MG/DL (ref 8.6–10.5)
CHLORIDE SERPL-SCNC: 103 MMOL/L (ref 98–107)
CO2 SERPL-SCNC: 26 MMOL/L (ref 22–29)
CREAT SERPL-MCNC: 0.94 MG/DL (ref 0.76–1.27)
DEPRECATED RDW RBC AUTO: 48.3 FL (ref 37–54)
EGFRCR SERPLBLD CKD-EPI 2021: 92.2 ML/MIN/1.73
EOSINOPHIL # BLD AUTO: 0.16 10*3/MM3 (ref 0–0.4)
EOSINOPHIL NFR BLD AUTO: 2.7 % (ref 0.3–6.2)
ERYTHROCYTE [DISTWIDTH] IN BLOOD BY AUTOMATED COUNT: 14.2 % (ref 12.3–15.4)
GLUCOSE BLDC GLUCOMTR-MCNC: 111 MG/DL (ref 70–130)
GLUCOSE BLDC GLUCOMTR-MCNC: 121 MG/DL (ref 70–130)
GLUCOSE BLDC GLUCOMTR-MCNC: 146 MG/DL (ref 70–130)
GLUCOSE BLDC GLUCOMTR-MCNC: 162 MG/DL (ref 70–130)
GLUCOSE SERPL-MCNC: 147 MG/DL (ref 65–99)
HCT VFR BLD AUTO: 31.3 % (ref 37.5–51)
HGB BLD-MCNC: 10.2 G/DL (ref 13–17.7)
IMM GRANULOCYTES # BLD AUTO: 0.03 10*3/MM3 (ref 0–0.05)
IMM GRANULOCYTES NFR BLD AUTO: 0.5 % (ref 0–0.5)
LYMPHOCYTES # BLD AUTO: 1.31 10*3/MM3 (ref 0.7–3.1)
LYMPHOCYTES NFR BLD AUTO: 21.9 % (ref 19.6–45.3)
MCH RBC QN AUTO: 30.2 PG (ref 26.6–33)
MCHC RBC AUTO-ENTMCNC: 32.6 G/DL (ref 31.5–35.7)
MCV RBC AUTO: 92.6 FL (ref 79–97)
MONOCYTES # BLD AUTO: 0.81 10*3/MM3 (ref 0.1–0.9)
MONOCYTES NFR BLD AUTO: 13.5 % (ref 5–12)
NEUTROPHILS NFR BLD AUTO: 3.63 10*3/MM3 (ref 1.7–7)
NEUTROPHILS NFR BLD AUTO: 60.6 % (ref 42.7–76)
NRBC BLD AUTO-RTO: 0 /100 WBC (ref 0–0.2)
PLATELET # BLD AUTO: 115 10*3/MM3 (ref 140–450)
PMV BLD AUTO: 10.8 FL (ref 6–12)
POTASSIUM SERPL-SCNC: 4.3 MMOL/L (ref 3.5–5.2)
RBC # BLD AUTO: 3.38 10*6/MM3 (ref 4.14–5.8)
SODIUM SERPL-SCNC: 138 MMOL/L (ref 136–145)
VANCOMYCIN PEAK SERPL-MCNC: 36.2 MCG/ML (ref 20–40)
VANCOMYCIN TROUGH SERPL-MCNC: 11.6 MCG/ML (ref 5–20)
WBC NRBC COR # BLD: 5.99 10*3/MM3 (ref 3.4–10.8)

## 2023-04-15 PROCEDURE — 63710000001 VITAMIN D3 125 MCG (5000 UT) TABLET: Performed by: PODIATRIST

## 2023-04-15 PROCEDURE — 97530 THERAPEUTIC ACTIVITIES: CPT

## 2023-04-15 PROCEDURE — 99024 POSTOP FOLLOW-UP VISIT: CPT | Performed by: PODIATRIST

## 2023-04-15 PROCEDURE — 63710000001 SENNOSIDES-DOCUSATE 8.6-50 MG TABLET: Performed by: PODIATRIST

## 2023-04-15 PROCEDURE — 63710000001 METFORMIN 500 MG TABLET: Performed by: PODIATRIST

## 2023-04-15 PROCEDURE — A9270 NON-COVERED ITEM OR SERVICE: HCPCS | Performed by: PODIATRIST

## 2023-04-15 PROCEDURE — 25010000002 VANCOMYCIN 10 G RECONSTITUTED SOLUTION: Performed by: PODIATRIST

## 2023-04-15 PROCEDURE — 80202 ASSAY OF VANCOMYCIN: CPT | Performed by: PODIATRIST

## 2023-04-15 PROCEDURE — 63710000001 LOSARTAN 25 MG TABLET: Performed by: PODIATRIST

## 2023-04-15 PROCEDURE — 63710000001 GLIPIZIDE 5 MG TABLET: Performed by: PODIATRIST

## 2023-04-15 PROCEDURE — 63710000001 METOPROLOL SUCCINATE XL 25 MG TABLET SUSTAINED-RELEASE 24 HOUR: Performed by: PODIATRIST

## 2023-04-15 PROCEDURE — 82962 GLUCOSE BLOOD TEST: CPT

## 2023-04-15 PROCEDURE — 63710000001 VITAMIN C 500 MG TABLET: Performed by: PODIATRIST

## 2023-04-15 PROCEDURE — 63710000001 DRONEDARONE 400 MG TABLET: Performed by: PODIATRIST

## 2023-04-15 PROCEDURE — 25010000002 AMPICILLIN-SULBACTAM PER 1.5 G: Performed by: PODIATRIST

## 2023-04-15 PROCEDURE — 97110 THERAPEUTIC EXERCISES: CPT

## 2023-04-15 PROCEDURE — 25010000002 HEPARIN (PORCINE) PER 1000 UNITS: Performed by: PODIATRIST

## 2023-04-15 PROCEDURE — 80048 BASIC METABOLIC PNL TOTAL CA: CPT | Performed by: PODIATRIST

## 2023-04-15 PROCEDURE — 63710000001 ACETAMINOPHEN 325 MG TABLET: Performed by: PODIATRIST

## 2023-04-15 PROCEDURE — 97535 SELF CARE MNGMENT TRAINING: CPT

## 2023-04-15 PROCEDURE — 63710000001 CETIRIZINE 10 MG TABLET: Performed by: PODIATRIST

## 2023-04-15 PROCEDURE — 85025 COMPLETE CBC W/AUTO DIFF WBC: CPT | Performed by: PODIATRIST

## 2023-04-15 PROCEDURE — 63710000001 MAGNESIUM OXIDE 400 (240 MG) MG TABLET: Performed by: PODIATRIST

## 2023-04-15 RX ADMIN — DRONEDARONE 400 MG: 400 TABLET, FILM COATED ORAL at 19:38

## 2023-04-15 RX ADMIN — HEPARIN SODIUM 5000 UNITS: 5000 INJECTION INTRAVENOUS; SUBCUTANEOUS at 05:01

## 2023-04-15 RX ADMIN — ACETAMINOPHEN 650 MG: 325 TABLET ORAL at 06:03

## 2023-04-15 RX ADMIN — Medication 10 ML: at 08:02

## 2023-04-15 RX ADMIN — Medication 400 MG: at 19:38

## 2023-04-15 RX ADMIN — ACETAMINOPHEN 650 MG: 325 TABLET ORAL at 13:20

## 2023-04-15 RX ADMIN — AMPICILLIN SODIUM AND SULBACTAM SODIUM 3 G: 2; 1 INJECTION, POWDER, FOR SOLUTION INTRAMUSCULAR; INTRAVENOUS at 14:28

## 2023-04-15 RX ADMIN — AMPICILLIN SODIUM AND SULBACTAM SODIUM 3 G: 2; 1 INJECTION, POWDER, FOR SOLUTION INTRAMUSCULAR; INTRAVENOUS at 00:08

## 2023-04-15 RX ADMIN — CETIRIZINE HYDROCHLORIDE 10 MG: 10 TABLET, FILM COATED ORAL at 08:00

## 2023-04-15 RX ADMIN — AMPICILLIN SODIUM AND SULBACTAM SODIUM 3 G: 2; 1 INJECTION, POWDER, FOR SOLUTION INTRAMUSCULAR; INTRAVENOUS at 05:00

## 2023-04-15 RX ADMIN — AMPICILLIN SODIUM AND SULBACTAM SODIUM 3 G: 2; 1 INJECTION, POWDER, FOR SOLUTION INTRAMUSCULAR; INTRAVENOUS at 18:04

## 2023-04-15 RX ADMIN — VANCOMYCIN HYDROCHLORIDE 1750 MG: 10 INJECTION, POWDER, LYOPHILIZED, FOR SOLUTION INTRAVENOUS at 14:52

## 2023-04-15 RX ADMIN — DOCUSATE SODIUM 50 MG AND SENNOSIDES 8.6 MG 2 TABLET: 8.6; 5 TABLET, FILM COATED ORAL at 08:00

## 2023-04-15 RX ADMIN — METFORMIN HYDROCHLORIDE 1000 MG: 500 TABLET, FILM COATED ORAL at 07:05

## 2023-04-15 RX ADMIN — SODIUM CHLORIDE 100 ML/HR: 9 INJECTION, SOLUTION INTRAVENOUS at 14:28

## 2023-04-15 RX ADMIN — GLIPIZIDE 10 MG: 5 TABLET ORAL at 07:05

## 2023-04-15 RX ADMIN — HEPARIN SODIUM 5000 UNITS: 5000 INJECTION INTRAVENOUS; SUBCUTANEOUS at 19:37

## 2023-04-15 RX ADMIN — OXYCODONE HYDROCHLORIDE AND ACETAMINOPHEN 1000 MG: 500 TABLET ORAL at 19:39

## 2023-04-15 RX ADMIN — METOPROLOL SUCCINATE 25 MG: 25 TABLET, FILM COATED, EXTENDED RELEASE ORAL at 19:38

## 2023-04-15 RX ADMIN — ACETAMINOPHEN 650 MG: 325 TABLET ORAL at 19:38

## 2023-04-15 RX ADMIN — LOSARTAN POTASSIUM 25 MG: 25 TABLET, FILM COATED ORAL at 08:00

## 2023-04-15 RX ADMIN — METFORMIN HYDROCHLORIDE 1000 MG: 500 TABLET, FILM COATED ORAL at 18:04

## 2023-04-15 RX ADMIN — DOCUSATE SODIUM 50 MG AND SENNOSIDES 8.6 MG 2 TABLET: 8.6; 5 TABLET, FILM COATED ORAL at 19:38

## 2023-04-15 RX ADMIN — Medication 400 MG: at 08:00

## 2023-04-15 RX ADMIN — METOPROLOL SUCCINATE 25 MG: 25 TABLET, FILM COATED, EXTENDED RELEASE ORAL at 08:00

## 2023-04-15 RX ADMIN — CHOLECALCIFEROL TAB 125 MCG (5000 UNIT) 5000 UNITS: 125 TAB at 19:38

## 2023-04-15 NOTE — PROGRESS NOTES
Podiatric Surgery Progress Note    Subjective     Post-Operative Day: 2 post-left reduction left ankle deformity with application of external fixator  Systemic or Specific Complaints: No Complaints    Objective     Vital signs in last 24 hours:  Temp:  [98.6 °F (37 °C)-100.7 °F (38.2 °C)] 98.7 °F (37.1 °C)  Heart Rate:  [] 94  Resp:  [18-20] 18  BP: (112-148)/(58-73) 148/72    General: alert, appears stated age and cooperative   Neurovascular:  Sensation left lower extremity absent  Capillary refill: Normal   Wound:  dressing c/d/i. Drain intact with 65 mL output   Range of Motion:  Absent secondary to frame application   DVT Exam: No evidence of DVT seen on physical exam.     Data Review  CBC:  Results from last 7 days   Lab Units 04/15/23  0611   WBC 10*3/mm3 5.99   RBC 10*6/mm3 3.38*   HEMOGLOBIN g/dL 10.2*   HEMATOCRIT % 31.3*   PLATELETS 10*3/mm3 115*       Assessment & Plan     Charcot left ankle    Patient doing well postoperatively.  Appreciate medicine recommendations.  Continue heparin for DVT prophylaxis.  Continue IV antibiotics.  LTAC referral pending.  Nonweightbearing left lower extremity. Call with questions.     LOS: 0 days     Marco A Thompson DPM    Date: 4/15/2023  Time: 09:11 CDT

## 2023-04-15 NOTE — PROGRESS NOTES
[facility] Medicine Services  INPATIENT PROGRESS NOTE    Length of Stay: 0  Date of Admission: 4/13/2023  Primary Care Physician: Jamala Bravo MD    Subjective   Chief Complaint: Opinion on diabetes  HPI: Patient states he is doing okay today.  Has shoulder and hip pain is worse than his foot pain.    As of today, 04/15/23  Review of Systems   Constitutional: Negative for appetite change, chills, fatigue and fever.   Respiratory: Negative for chest tightness and shortness of breath.    Cardiovascular: Negative for chest pain, palpitations and leg swelling.   Gastrointestinal: Negative for abdominal pain, constipation, diarrhea, nausea and vomiting.   Musculoskeletal:        Shoulder pain, hip pain, foot pain   Skin: Negative for wound.   Neurological: Negative for dizziness, weakness, light-headedness, numbness and headaches.        All pertinent negatives and positives are as above. All other systems have been reviewed and are negative unless otherwise stated.    Objective    Temp:  [97.9 °F (36.6 °C)-100.7 °F (38.2 °C)] 97.9 °F (36.6 °C)  Heart Rate:  [85-99] 90  Resp:  [16-18] 16  BP: (112-148)/(58-72) 113/69    AM-PAC 6 Clicks Score (PT): 9 (04/15/23 1100)    As of today, 04/15/23  Physical Exam  Vitals reviewed.   Constitutional:       Appearance: He is well-developed.   HENT:      Head: Normocephalic and atraumatic.   Eyes:      Pupils: Pupils are equal, round, and reactive to light.   Cardiovascular:      Rate and Rhythm: Normal rate and regular rhythm.      Heart sounds: Normal heart sounds. No murmur heard.    No friction rub. No gallop.   Pulmonary:      Effort: Pulmonary effort is normal. No respiratory distress.      Breath sounds: Normal breath sounds. No wheezing or rales.   Chest:      Chest wall: No tenderness.   Abdominal:      General: Bowel sounds are normal. There is no distension.      Palpations: Abdomen is soft.      Tenderness: There  is no abdominal tenderness.   Musculoskeletal:      Cervical back: Normal range of motion and neck supple.   Feet:      Comments: Left foot in external fixator  Psychiatric:         Behavior: Behavior normal.           Results Review:  I have reviewed the labs, radiology results, and diagnostic studies.    Laboratory Data:   Results from last 7 days   Lab Units 04/15/23  0611 04/14/23  0627 04/13/23 2146 04/13/23  1100   SODIUM mmol/L 138 137 136 134*   POTASSIUM mmol/L 4.3 4.9 5.0 4.5   CHLORIDE mmol/L 103 101 101 97*   CO2 mmol/L 26.0 25.0 19.0* 23.0   BUN mg/dL 14 23 28* 27*   CREATININE mg/dL 0.94 1.13 1.34* 1.10   GLUCOSE mg/dL 147* 172* 220* 149*   CALCIUM mg/dL 8.3* 8.1* 8.1* 9.1   BILIRUBIN mg/dL  --   --   --  0.7   ALK PHOS U/L  --   --   --  110   ALT (SGPT) U/L  --   --   --  37   AST (SGOT) U/L  --   --   --  29   ANION GAP mmol/L 9.0 11.0 16.0* 14.0     Estimated Creatinine Clearance: 138.9 mL/min (by C-G formula based on SCr of 0.94 mg/dL).          Results from last 7 days   Lab Units 04/15/23  0611 04/14/23 0627 04/13/23 2146   WBC 10*3/mm3 5.99 7.71 8.99   HEMOGLOBIN g/dL 10.2* 10.7* 12.5*   HEMATOCRIT % 31.3* 32.7* 38.0   PLATELETS 10*3/mm3 115* 139* 132*           Culture Data:   No results found for: BLOODCX  No results found for: URINECX  No results found for: RESPCX  No results found for: WOUNDCX  No results found for: STOOLCX  No components found for: BODYFLD    Radiology Data:   Imaging Results (Last 24 Hours)     ** No results found for the last 24 hours. **          I have utilized all available immediate resources to obtain, update, or review the patient's current medications (including all prescriptions, over-the-counter products, herbals, cannabis/cannabidiol products, and vitamin/mineral/dietary (nutritional) supplements).       Assessment/Plan     Active Hospital Problems    Diagnosis    • **Cellulitis of left foot    • Charcot's joint of left foot    • Type 2 diabetes mellitus  with skin complication        Plan:  1.  Diabetes mellitus type 2: Currently well controlled.  Continue with current treatment.  2.  Charcot foot  3.  Cellulitis of the left foot: Continue antibiotics per podiatry.      Medical Decision Making  Number and Complexity of problems: Several highly complex medical problems    Conditions and Status:        Condition is unchanged.     Discussed with: Patient and nursing     Treatment Plan  As above    Care Planning  Shared decision making: Patient agreeable to plan of care  Code status and discussions: Full code    Disposition  Per primary team      The patient was evaluated during the global COVID-19 pandemic, and the diagnosis was suspected/considered upon their initial presentation.  Evaluation, treatment, and testing were consistent with current guidelines for patients who present with complaints or symptoms that may be related to COVID-19.    I confirmed that the patient's Advance Care Plan is present, code status is documented, or surrogate decision maker is listed in the patient's medical record.        This document has been electronically signed by Adis Castellon MD on April 15, 2023 13:49 CDT

## 2023-04-15 NOTE — PLAN OF CARE
Goal Outcome Evaluation:  Plan of Care Reviewed With: patient        Progress: improving  Outcome Evaluation: pt sitting on EOB with TRINH upon entry. pt performed sitting scoots x 4 >HOB with CGA, pt sat @ EOB ~30 minutes with SBA-I, pt sit>sup with min assit of 1 for L LE. pt will require 24/7 care & continued PT services (possibly ARU?)

## 2023-04-15 NOTE — PROGRESS NOTES
"Pharmacokinetics by Pharmacy - Vancomycin    Emre Lisa is a 63 y.o. male receiving vancomycin day 2 for bone and/or joint infection  Patient is also receiving ampicillin-sulbactam    Objective:  [Ht: 193 cm (76\"); Wt: (!) 168 kg (370 lb)]     WBC   Date Value Ref Range Status   04/15/2023 5.99 3.40 - 10.80 10*3/mm3 Final   04/14/2023 7.71 3.40 - 10.80 10*3/mm3 Final   04/13/2023 8.99 3.40 - 10.80 10*3/mm3 Final      C-Reactive Protein   Date Value Ref Range Status   04/13/2023 1.26 (H) 0.00 - 0.50 mg/dL Final      Temp Readings from Last 1 Encounters:   04/15/23 97.9 °F (36.6 °C) (Oral)     Estimated Creatinine Clearance: 138.9 mL/min (by C-G formula based on SCr of 0.94 mg/dL).   Creatinine   Date Value Ref Range Status   04/15/2023 0.94 0.76 - 1.27 mg/dL Final   04/14/2023 1.13 0.76 - 1.27 mg/dL Final   04/13/2023 1.34 (H) 0.76 - 1.27 mg/dL Final       Vancomycin Peak   Date Value Ref Range Status   04/15/2023 36.20 20.00 - 40.00 mcg/mL Final     Vancomycin Trough   Date Value Ref Range Status   04/15/2023 11.60 5.00 - 20.00 mcg/mL Final       Culture Results:  Microbiology Results (last 10 days)     ** No results found for the last 240 hours. **        No results found for: RESPCX    Assessment:    WBC 5.99, WNL   Scr 0.94, WNL   Patient remains afebrile    Vancomycin peak resulted at 36.2 (goal 30-40) and was drawn 1.5 hours after the end of infusion. Vancomycin trough resulted at 11.6 and was drawn appropriately. This gives an AUC of 625 (goal 400-600). Will reduce dose to vancomycin 1750 mg IV Q12H. This gives an estimated peak of 38 and a trough of 11.8. The predicted AUC is 547.      Plan:  1. Change to vancomycin 1750mg IV Q12H starting today at 1315. Consult ends 5/25/23.   2. Vancomycin peak on 4/17 on 0415 and vancomycin trough on 4/17 at 1245  3. Pharmacy will monitor renal function and adjust dose accordingly.      Eloy Mendoza [facility]   04/15/23 12:25 CDT  "

## 2023-04-15 NOTE — PLAN OF CARE
Problem: Adult Inpatient Plan of Care  Goal: Plan of Care Review  Outcome: Ongoing, Progressing  Flowsheets  Taken 4/15/2023 1321  Progress: improving  Plan of Care Reviewed With: patient  Taken 4/15/2023 1313  Progress: improving  Plan of Care Reviewed With: patient  Outcome Evaluation: Patient long sitting in bed upon arrival to room. Agreed to sit EOB with Mod A 1 person assist. Grooming Independent. UB bathing and dressing SBA. LB bathing SBA. LB dressing mod A. Educated patient on fall precautions and Home safety. Pt sitting EOB ~60 minutes prior to PTs arrival to room. All needs in reach. PT with Patient EOB. Pt could continue to benefit from OT services.   Goal Outcome Evaluation:  Plan of Care Reviewed With: patient        Progress: improving  Outcome Evaluation: Patient long sitting in bed upon arrival to room. Agreed to sit EOB with Mod A 1 person assist. Grooming Independent. UB bathing and dressing SBA. LB bathing SBA. LB dressing mod A. Educated patient on fall precautions and Home safety. Pt sitting EOB ~60 minutes prior to PTs arrival to room. All needs in reach. PT with Patient EOB. Pt could continue to benefit from OT services.

## 2023-04-15 NOTE — THERAPY TREATMENT NOTE
Acute Care - Physical Therapy Treatment Note  [facility]     Patient Name: Emre Lisa  : 1960  MRN: [id number]  Today's Date: 4/15/2023      Visit Dx:     ICD-10-CM ICD-9-CM   1. Cellulitis of left foot  L03.116 682.7   2. Charcot's joint of left foot  M14.672 094.0     713.5   3. Type 2 diabetes mellitus with foot ulcer, unspecified whether long term insulin use  E11.621 250.80    L97.509 707.15   4. Impaired functional mobility, balance, gait, and endurance  Z74.09 V49.89   5. Impaired mobility and ADLs  Z74.09 V49.89    Z78.9      Patient Active Problem List   Diagnosis   • Foot osteomyelitis, right   • Nodule of groin   • Type 2 diabetes mellitus with skin complication   • Status post transmetatarsal amputation of right foot   • Hammer toe of left foot   • Hallux malleus of left foot   • Cellulitis of left foot   • Infected hardware in left leg   • Chronic multifocal osteomyelitis of left foot   • Charcot's joint of left foot   • Skin ulcer of left foot, limited to breakdown of skin     Past Medical History:   Diagnosis Date   • Arthritis    • Atrial fibrillation    • Diabetes mellitus    • Diabetic foot ulcer associated with type 2 diabetes mellitus     left great toe   • Hallux malleus of left foot    • Hammer toe    • Hypertension    • MI (myocardial infarction)    • Neuropathy in diabetes    • Onychomycosis    • Presence of biventricular cardiac pacemaker    • Rheumatoid arthritis      Past Surgical History:   Procedure Laterality Date   • AMPUTATION DIGIT Right 3/5/2017    Procedure: RIGHT AMPUTATION TRANSMETATRASAL , RECESSION GASTROCNEMOUS;  Surgeon: Marco A Thompson DPM;  Location: [facility];  Service:    • CARDIAC DEFIBRILLATOR PLACEMENT  ,    • CARPAL TUNNEL RELEASE  2015    elbow and wrist left arm   • FOOT IRRIGATION, DEBRIDEMENT AND REPAIR Left 3/7/2018    Procedure: FOOT IRRIGATION, DEBRIDEMENT AND REPAIR;  Surgeon: Marco A Thompson DPM;  Location: [facility];  Service:     • FOOT IRRIGATION, DEBRIDEMENT AND REPAIR Left 3/10/2018    Procedure: FOOT IRRIGATION, DEBRIDEMENT AND REPAIR;  Surgeon: Marco A Thompson DPM;  Location: [facility] OR;  Service: Podiatry   • FOOT WOUND CLOSURE Right 3/2/2017    Procedure: INCISION AND DRAINAGE, RIGHT FOOT;  Surgeon: Tung Rosenthal DPM;  Location: [facility] OR;  Service:    • HAMMER TOE REPAIR Left 2/15/2018    Procedure: HAMMERTOE CORRECTION LEFT SECOND, THIRD AND FOURTH TOES AND HALLUX INTERPHALANGEAL JOINT ARTHRODESIS LEFT FOOT (Micro Asnis, 4.0 & 5.0 Asnis)      (c-arm);  Surgeon: Marco A Thompson DPM;  Location: [facility] OR;  Service:    • HARDWARE REMOVAL Left 3/5/2018    Procedure: ANKLE/FOOT HARDWARE REMOVAL;  Surgeon: Marco A Thompson DPM;  Location: [facility] OR;  Service:    • INCISION AND DRAINAGE LEG Left 3/5/2018    Procedure: INCISION AND DRAINAGE LOWER EXTREMITY;  Surgeon: Marco A Thompson DPM;  Location: [facility] OR;  Service:    • PLANTAR FASCIA RELEASE Left 11/21/2019    Procedure: PLANTAR PLANING LEFT FOOT AND ALL OTHER INDICATED PROCEDURES;  Surgeon: Marco A Thompson DPM;  Location: [facility];  Service: Podiatry   • TOE AMPUTATION Right 2016   • TONSILECTOMY, ADENOIDECTOMY, BILATERAL MYRINGOTOMY AND TUBES      age 25     PT Assessment (last 12 hours)     PT Evaluation and Treatment     Row Name 04/15/23 1030          Physical Therapy Time and Intention    Subjective Information complains of;pain  -TA     Mode of Treatment physical therapy  -TA     Row Name 04/15/23 1030          General Information    Patient Profile Reviewed yes  -TA     Existing Precautions/Restrictions fall;non-weight bearing;left  -TA     Row Name 04/15/23 1030          Pain    Pretreatment Pain Rating 4/10  -TA     Posttreatment Pain Rating 4/10  -TA     Pain Location - Side/Orientation Left  -TA     Pain Location lower  -TA     Pain Location - extremity  -TA     Row Name 04/15/23 1030          Cognition    Orientation Status (Cognition) oriented x 4  -TA     Personal  Safety Interventions fall prevention program maintained;gait belt;muscle strengthening facilitated;nonskid shoes/slippers when out of bed;supervised activity  -TA     Row Name 04/15/23 1030          Range of Motion Comprehensive    General Range of Motion bilateral lower extremity ROM WFL  -TA     Row Name 04/15/23 1030          Strength Comprehensive (MMT)    General Manual Muscle Testing (MMT) Assessment lower extremity strength deficits identified  -TA     Row Name 04/15/23 1030          Mobility    Extremity Weight-bearing Status left lower extremity  -TA     Left Lower Extremity (Weight-bearing Status) non weight-bearing (NWB)  -TA     Row Name 04/15/23 1030          Bed Mobility    Bed Mobility rolling left;rolling right;supine-sit;sit-supine;scooting/bridging  -TA     Rolling Right Maries (Bed Mobility) standby assist  -TA     Scooting/Bridging Maries (Bed Mobility) contact guard  sitting scoots x 4>HOB, supine unilateral (R) bridges x 3  -TA     Supine-Sit Maries (Bed Mobility) not tested  -TA     Sit-Supine Maries (Bed Mobility) minimum assist (75% patient effort);1 person assist  for L LE  -TA     Assistive Device (Bed Mobility) bed rails;head of bed elevated  -TA     Comment, (Bed Mobility) pt sat @ EOB ~30 minutes with SBA-I  -TA     Row Name 04/15/23 1030          Sit-Stand Transfer    Sit-Stand Maries (Transfers) not tested  -TA     Row Name 04/15/23 1030          Gait/Stairs (Locomotion)    Maries Level (Gait) not tested  -TA     Row Name 04/15/23 1030          Safety Issues, Functional Mobility    Impairments Affecting Function (Mobility) pain;strength  -TA     Row Name 04/15/23 1030          Hip (Therapeutic Exercise)    Hip (Therapeutic Exercise) isometric exercises  -TA     Hip Isometrics (Therapeutic Exercise) bilateral;gluteal sets;supine;10 repetitions;5 repetitions  -TA     Row Name 04/15/23 1030          Knee (Therapeutic Exercise)    Knee (Therapeutic  Exercise) isometric exercises  -TA     Knee Isometrics (Therapeutic Exercise) bilateral;quad sets;supine;10 repetitions;5 repetitions  -TA     Row Name 04/15/23 1030          Ankle (Therapeutic Exercise)    Ankle (Therapeutic Exercise) AROM (active range of motion)  -TA     Ankle AROM (Therapeutic Exercise) right;dorsiflexion;plantarflexion;supine;15 repititions  -TA     Row Name             Wound 04/13/23 1908 Left anterior foot Incision    Wound - Properties Group Placement Date: 04/13/23  -LS Placement Time: 1908  -LS Present on Hospital Admission: N  -LS Side: Left  -LS Orientation: anterior  -LS Location: foot  -LS Primary Wound Type: Incision  -LS    Retired Wound - Properties Group Placement Date: 04/13/23  -LS Placement Time: 1908  -LS Present on Hospital Admission: N  -LS Side: Left  -LS Orientation: anterior  -LS Location: foot  -LS Primary Wound Type: Incision  -LS    Retired Wound - Properties Group Date first assessed: 04/13/23  -LS Time first assessed: 1908  -LS Present on Hospital Admission: N  -LS Side: Left  -LS Location: foot  -LS Primary Wound Type: Incision  -LS    Row Name             Wound 04/14/23 2157 Left medial gluteal    Wound - Properties Group Placement Date: 04/14/23  -ML Placement Time: 2157  -ML Present on Hospital Admission: Y  -ML, it was passed on in report pt came in with wound, pt stated wound was present on admission  Side: Left  -ML Orientation: medial  -ML Location: gluteal  -ML    Retired Wound - Properties Group Placement Date: 04/14/23  -ML Placement Time: 2157  -ML Present on Hospital Admission: Y  -ML, it was passed on in report pt came in with wound, pt stated wound was present on admission  Side: Left  -ML Orientation: medial  -ML Location: gluteal  -ML    Retired Wound - Properties Group Date first assessed: 04/14/23  -ML Time first assessed: 2157  -ML Present on Hospital Admission: Y  -ML, it was passed on in report pt came in with wound, pt stated wound was present  on admission  Side: Left  -ML Location: gluteal  -ML    Row Name 04/15/23 1030          Plan of Care Review    Plan of Care Reviewed With patient  -TA     Progress improving  -TA     Outcome Evaluation pt sitting on EOB with TRINH upon entry. pt performed sitting scoots x 4 >HOB with CGA, pt sat @ EOB ~30 minutes with SBA-I, pt sit>sup with min assit of 1 for L LE. pt will require 24/7 care & continued PT services (possibly ARU?)  -TA     Row Name 04/15/23 1030          Vital Signs    Pre Systolic BP Rehab 129  -TA     Pre Treatment Diastolic BP 61  -TA     Post Systolic BP Rehab 126  -TA     Post Treatment Diastolic BP 78  -TA     Pretreatment Heart Rate (beats/min) 92  -TA     Intratreatment Heart Rate (beats/min) 100  -TA     Posttreatment Heart Rate (beats/min) 87  -TA     Pre SpO2 (%) 96  -TA     O2 Delivery Pre Treatment supplemental O2  -TA     Intra SpO2 (%) 97  -TA     O2 Delivery Intra Treatment supplemental O2  -TA     Post SpO2 (%) 95  -TA     O2 Delivery Post Treatment supplemental O2  -TA     Pre Patient Position Sitting  -TA     Post Patient Position Supine  -TA     Row Name 04/15/23 1030          Positioning and Restraints    Pre-Treatment Position in bed  -TA     Post Treatment Position bed  -TA     In Bed supine;call light within reach;exit alarm on;LLE elevated  -TA     Row Name 04/15/23 1030          Therapy Assessment/Plan (PT)    Rehab Potential (PT) good, to achieve stated therapy goals  -TA     Criteria for Skilled Interventions Met (PT) yes;meets criteria;skilled treatment is necessary  -TA     Therapy Frequency (PT) daily  -TA     Comment, Therapy Assessment/Plan (PT) continue  -TA           User Key  (r) = Recorded By, (t) = Taken By, (c) = Cosigned By    Initials Name Provider Type    Jennifer Diallo, RN Registered Nurse    Magda Jimenez PTA Physical Therapist Assistant    Kranthi Swartz RN Registered Nurse                Physical Therapy Education     Title: PT OT SLP  Therapies (In Progress)     Topic: Physical Therapy (In Progress)     Point: Mobility training (Done)     Learning Progress Summary           Patient Acceptance, E, VU by  at 4/14/2023 1242    Comment: POC and goals   Significant Other Acceptance, E, VU by  at 4/14/2023 1242    Comment: POC and goals                   Point: Home exercise program (Not Started)     Learner Progress:  Not documented in this visit.          Point: Body mechanics (Not Started)     Learner Progress:  Not documented in this visit.          Point: Precautions (Done)     Learning Progress Summary           Patient Acceptance, E, VU by  at 4/14/2023 1242    Comment: POC and goals   Significant Other Acceptance, E, VU by  at 4/14/2023 1242    Comment: POC and goals                               User Key     Initials Effective Dates Name Provider Type Discipline     01/09/23 -  Yante Vega, PT Physical Therapist PT              PT Recommendation and Plan  Anticipated Discharge Disposition (PT): inpatient rehabilitation facility  Therapy Frequency (PT): daily  Plan of Care Reviewed With: patient  Progress: improving  Outcome Evaluation: pt sitting on EOB with TRINH upon entry. pt performed sitting scoots x 4 >HOB with CGA, pt sat @ EOB ~30 minutes with SBA-I, pt sit>sup with min assit of 1 for L LE. pt will require 24/7 care & continued PT services (possibly ARU?)   Outcome Measures     Row Name 04/15/23 1100             How much help from another person do you currently need...    Turning from your back to your side while in flat bed without using bedrails? 3  -TA      Moving from lying on back to sitting on the side of a flat bed without bedrails? 2  -TA      Moving to and from a bed to a chair (including a wheelchair)? 1  -TA      Standing up from a chair using your arms (e.g., wheelchair, bedside chair)? 1  -TA      Climbing 3-5 steps with a railing? 1  -TA      To walk in hospital room? 1  -TA      AM-PAC 6 Clicks Score (PT)  9  -TA         Functional Assessment    Outcome Measure Options AM-PAC 6 Clicks Basic Mobility (PT)  -TA            User Key  (r) = Recorded By, (t) = Taken By, (c) = Cosigned By    Initials Name Provider Type    Magda Jimenez PTA Physical Therapist Assistant                 Time Calculation:    PT Charges     Row Name 04/15/23 1153             Time Calculation    Start Time 1030  -TA      Stop Time 1119  -TA      Time Calculation (min) 49 min  -TA      PT Received On 04/15/23  -TA         Time Calculation- PT    Total Timed Code Minutes- PT 49 minute(s)  -TA         Timed Charges    25956 - PT Therapeutic Exercise Minutes 15  -TA      87726 - PT Therapeutic Activity Minutes 34  -TA         Total Minutes    Timed Charges Total Minutes 49  -TA       Total Minutes 49  -TA            User Key  (r) = Recorded By, (t) = Taken By, (c) = Cosigned By    Initials Name Provider Type    Magda Jimenez PTA Physical Therapist Assistant              Therapy Charges for Today     Code Description Service Date Service Provider Modifiers Qty    13945962190 HC PT THER PROC EA 15 MIN 4/15/2023 Magda Rader PTA GP 1    32046465758 HC PT THERAPEUTIC ACT EA 15 MIN 4/15/2023 Magda Rader PTA GP 2          PT G-Codes  Outcome Measure Options: AM-PAC 6 Clicks Basic Mobility (PT)  AM-PAC 6 Clicks Score (PT): 9  AM-PAC 6 Clicks Score (OT): 15    Magda Rader PTA  4/15/2023

## 2023-04-15 NOTE — THERAPY TREATMENT NOTE
Patient Name: Emre Lisa  : 1960    MRN: [id number]                              Today's Date: 4/15/2023       Admit Date: 2023    Visit Dx:     ICD-10-CM ICD-9-CM   1. Cellulitis of left foot  L03.116 682.7   2. Charcot's joint of left foot  M14.672 094.0     713.5   3. Type 2 diabetes mellitus with foot ulcer, unspecified whether long term insulin use  E11.621 250.80    L97.509 707.15   4. Impaired functional mobility, balance, gait, and endurance  Z74.09 V49.89   5. Impaired mobility and ADLs  Z74.09 V49.89    Z78.9      Patient Active Problem List   Diagnosis   • Foot osteomyelitis, right   • Nodule of groin   • Type 2 diabetes mellitus with skin complication   • Status post transmetatarsal amputation of right foot   • Hammer toe of left foot   • Hallux malleus of left foot   • Cellulitis of left foot   • Infected hardware in left leg   • Chronic multifocal osteomyelitis of left foot   • Charcot's joint of left foot   • Skin ulcer of left foot, limited to breakdown of skin     Past Medical History:   Diagnosis Date   • Arthritis    • Atrial fibrillation    • Diabetes mellitus    • Diabetic foot ulcer associated with type 2 diabetes mellitus     left great toe   • Hallux malleus of left foot    • Hammer toe    • Hypertension    • MI (myocardial infarction)    • Neuropathy in diabetes    • Onychomycosis    • Presence of biventricular cardiac pacemaker    • Rheumatoid arthritis      Past Surgical History:   Procedure Laterality Date   • AMPUTATION DIGIT Right 3/5/2017    Procedure: RIGHT AMPUTATION TRANSMETATRASAL , RECESSION GASTROCNEMOUS;  Surgeon: Marco A Thompson DPM;  Location: [facility];  Service:    • CARDIAC DEFIBRILLATOR PLACEMENT  ,    • CARPAL TUNNEL RELEASE  2015    elbow and wrist left arm   • FOOT IRRIGATION, DEBRIDEMENT AND REPAIR Left 3/7/2018    Procedure: FOOT IRRIGATION, DEBRIDEMENT AND REPAIR;  Surgeon: Marco A Thompson DPM;  Location: [facility];  Service:    • FOOT  IRRIGATION, DEBRIDEMENT AND REPAIR Left 3/10/2018    Procedure: FOOT IRRIGATION, DEBRIDEMENT AND REPAIR;  Surgeon: Marco A Thompson DPM;  Location: [facility] OR;  Service: Podiatry   • FOOT WOUND CLOSURE Right 3/2/2017    Procedure: INCISION AND DRAINAGE, RIGHT FOOT;  Surgeon: Tung Rosenthal DPM;  Location: [facility] OR;  Service:    • HAMMER TOE REPAIR Left 2/15/2018    Procedure: HAMMERTOE CORRECTION LEFT SECOND, THIRD AND FOURTH TOES AND HALLUX INTERPHALANGEAL JOINT ARTHRODESIS LEFT FOOT (Micro Asnis, 4.0 & 5.0 Asnis)      (c-arm);  Surgeon: Marco A Thompson DPM;  Location: [facility] OR;  Service:    • HARDWARE REMOVAL Left 3/5/2018    Procedure: ANKLE/FOOT HARDWARE REMOVAL;  Surgeon: Marco A Thompson DPM;  Location: [facility] OR;  Service:    • INCISION AND DRAINAGE LEG Left 3/5/2018    Procedure: INCISION AND DRAINAGE LOWER EXTREMITY;  Surgeon: Marco A Thompson DPM;  Location: [facility] OR;  Service:    • PLANTAR FASCIA RELEASE Left 11/21/2019    Procedure: PLANTAR PLANING LEFT FOOT AND ALL OTHER INDICATED PROCEDURES;  Surgeon: Marco A Thompson DPM;  Location: [facility] OR;  Service: Podiatry   • TOE AMPUTATION Right 2016   • TONSILECTOMY, ADENOIDECTOMY, BILATERAL MYRINGOTOMY AND TUBES      age 25      General Information     Row Name 04/15/23 1305          OT Time and Intention    Document Type therapy note (daily note)  -LW     Mode of Treatment individual therapy;occupational therapy  -     Row Name 04/15/23 130          General Information    Patient Profile Reviewed yes  -LW     Existing Precautions/Restrictions fall;non-weight bearing;left  -     Row Name 04/15/23 1305          Cognition    Orientation Status (Cognition) oriented x 4  -     Row Name 04/15/23 1301          Safety Issues, Functional Mobility    Impairments Affecting Function (Mobility) strength;pain  -LW           User Key  (r) = Recorded By, (t) = Taken By, (c) = Cosigned By    Initials Name Provider Type    LW Oanh Pacheco COTA Occupational  Therapist Assistant                 Mobility/ADL's     Row Name 04/15/23 1305          Bed Mobility    Bed Mobility supine-sit  -LW     Supine-Sit [place] (Bed Mobility) moderate assist (50% patient effort)  -LW     Sit-Supine [place] (Bed Mobility) not tested  -LW     Assistive Device (Bed Mobility) bed rails;head of bed elevated  -LW     Comment, (Bed Mobility) Patient sat EOB ~ 60 minutes with SBA.  -LW     Row Name 04/15/23 1305          Activities of Daily Living    BADL Assessment/Intervention bathing;upper body dressing;lower body dressing;grooming  -LW     Row Name 04/15/23 1305          Bathing Assessment/Intervention    [place] Level (Bathing) lower body;upper body;standby assist  -LW     Position (Bathing) edge of bed sitting  -LW     Row Name 04/15/23 1305          Upper Body Dressing Assessment/Training    [place] Level (Upper Body Dressing) upper body dressing skills;doff;don;standby assist  [facility] gown  -LW     Position (Upper Body Dressing) edge of bed sitting  -LW     Row Name 04/15/23 1305          Lower Body Dressing Assessment/Training    [place] Level (Lower Body Dressing) lower body dressing skills;doff;shoes/slippers;socks;moderate assist (50% patient effort)  -LW     Position (Lower Body Dressing) edge of bed sitting  -LW     Row Name 04/15/23 1305          Grooming Assessment/Training    [place] Level (Grooming) oral care regimen;wash face, hands;independent  -LW     Position (Grooming) edge of bed sitting  -LW           User Key  (r) = Recorded By, (t) = Taken By, (c) = Cosigned By    Initials Name Provider Type    Oanh Trevino COTA Occupational Therapist Assistant               Obj/Interventions    No documentation.                Goals/Plan     Row Name 04/15/23 0915          Transfer Goal 1 (OT)    Activity/Assistive Device (Transfer Goal 1, OT) sit-to-stand/stand-to-sit;bed-to-chair/chair-to-bed;commode, bedside with drop arms  -      [place] Level/Cues Needed (Transfer Goal 1, OT) moderate assist (50-74% patient effort)  -LW     Time Frame (Transfer Goal 1, OT) long term goal (LTG);by discharge  -LW     Progress/Outcome (Transfer Goal 1, OT) goal not met  -LW     Row Name 04/15/23 0915          Bathing Goal 1 (OT)    Activity/Device (Bathing Goal 1, OT) lower body bathing  -LW     [place] Level/Cues Needed (Bathing Goal 1, OT) moderate assist (50-74% patient effort)  -LW     Time Frame (Bathing Goal 1, OT) long term goal (LTG);by discharge  -LW     Progress/Outcomes (Bathing Goal 1, OT) goal not met  -LW     Row Name 04/15/23 0915          Dressing Goal 1 (OT)    Activity/Device (Dressing Goal 1, OT) upper body dressing  -LW     [place]/Cues Needed (Dressing Goal 1, OT) set-up required  -LW     Time Frame (Dressing Goal 1, OT) long term goal (LTG);by discharge  -LW     Progress/Outcome (Dressing Goal 1, OT) goal not met;good progress toward goal  -LW     Row Name 04/15/23 0915          Toileting Goal 1 (OT)    Activity/Device (Toileting Goal 1, OT) toileting skills, all;adjust/manage clothing;perform perineal hygiene;commode, 3-in-1  -LW     [place] Level/Cues Needed (Toileting Goal 1, OT) minimum assist (75% or more patient effort)  -LW     Time Frame (Toileting Goal 1, OT) long term goal (LTG);by discharge  -LW     Progress/Outcome (Toileting Goal 1, OT) goal not met  -LW           User Key  (r) = Recorded By, (t) = Taken By, (c) = Cosigned By    Initials Name Provider Type    LW Oanh Pacheco COTA Occupational Therapist Assistant               Clinical Impression     Row Name 04/15/23 4793          Pain Assessment    Pretreatment Pain Rating 3/10  -LW     Posttreatment Pain Rating 3/10  -LW     Pain Location - Side/Orientation Left  -LW     Pain Location lower  -LW     Pain Location - extremity  -LW     Pain Intervention(s) Repositioned  -LW     Row Name 04/15/23 0403          Plan of Care Review    Plan of Care  Reviewed With patient  -LW     Progress improving  -LW     Outcome Evaluation Patient long sitting in bed upon arrival to room. Agreed to sit EOB with Mod A 1 person assist. Grooming Independent. UB bathing and dressing SBA. LB bathing SBA. LB dressing mod A. Educated patient on fall precautions and Home safety. Pt sitting EOB ~60 minutes prior to PTs arrival to room. All needs in reach. PT with Patient EOB. Pt could continue to benefit from OT services.  -LW     Row Name 04/15/23 1313          Positioning and Restraints    Pre-Treatment Position in bed  -LW     Post Treatment Position bed  -LW     In Bed sitting EOB;call light within reach;encouraged to call for assist;exit alarm on;with PT  -LW           User Key  (r) = Recorded By, (t) = Taken By, (c) = Cosigned By    Initials Name Provider Type    Oanh Trevino COTA Occupational Therapist Assistant               Outcome Measures     Row Name 04/15/23 1320          How much help from another is currently needed...    Putting on and taking off regular lower body clothing? 1  -LW     Bathing (including washing, rinsing, and drying) 3  -LW     Toileting (which includes using toilet bed pan or urinal) 1  -LW     Putting on and taking off regular upper body clothing 3  -LW     Taking care of personal grooming (such as brushing teeth) 4  -LW     Eating meals 4  -LW     AM-PAC 6 Clicks Score (OT) 16  -LW     Row Name 04/15/23 1100          How much help from another person do you currently need...    Turning from your back to your side while in flat bed without using bedrails? 3  -TA     Moving from lying on back to sitting on the side of a flat bed without bedrails? 2  -TA     Moving to and from a bed to a chair (including a wheelchair)? 1  -TA     Standing up from a chair using your arms (e.g., wheelchair, bedside chair)? 1  -TA     Climbing 3-5 steps with a railing? 1  -TA     To walk in hospital room? 1  -TA     AM-PAC 6 Clicks Score (PT) 9  -TA     Highest  level of mobility 3 --> Sat at edge of bed  -TA     Row Name 04/15/23 1100          Functional Assessment    Outcome Measure Options AM-PAC 6 Clicks Basic Mobility (PT)  -TA           User Key  (r) = Recorded By, (t) = Taken By, (c) = Cosigned By    Initials Name Provider Type    Magda Jimenez, CHENTE Physical Therapist Assistant    Oanh Trevino COTA Occupational Therapist Assistant                Occupational Therapy Education     Title: PT OT SLP Therapies (In Progress)     Topic: Occupational Therapy (Done)     Point: ADL training (Done)     Description:   Instruct learner(s) on proper safety adaptation and remediation techniques during self care or transfers.   Instruct in proper use of assistive devices.              Learning Progress Summary           Patient Acceptance, E,TB,H, VU by LW at 4/15/2023 1321    Comment: Educated patient on fall precautions and Home safety    Acceptance, E,TB, VU by CM at 4/14/2023 1411    Comment: OT POC, role of OT, d/c recommendations, NWB status   Family Acceptance, E,TB, VU by CM at 4/14/2023 1411    Comment: OT POC, role of OT, d/c recommendations, NWB status                   Point: Home exercise program (Done)     Description:   Instruct learner(s) on appropriate technique for monitoring, assisting and/or progressing therapeutic exercises/activities.              Learning Progress Summary           Patient Acceptance, E,TB,H, VU by LW at 4/15/2023 1321    Comment: Educated patient on fall precautions and Home safety                   Point: Precautions (Done)     Description:   Instruct learner(s) on prescribed precautions during self-care and functional transfers.              Learning Progress Summary           Patient Acceptance, E,TB,H, VU by LW at 4/15/2023 1321    Comment: Educated patient on fall precautions and Home safety    Acceptance, E,TB, VU by CM at 4/14/2023 1411    Comment: OT POC, role of OT, d/c recommendations, NWB status   Family Acceptance,  E,TB, VU by CM at 4/14/2023 1411    Comment: OT POC, role of OT, d/c recommendations, NWB status                   Point: Body mechanics (Done)     Description:   Instruct learner(s) on proper positioning and spine alignment during self-care, functional mobility activities and/or exercises.              Learning Progress Summary           Patient Acceptance, E,TB,H, VU by LW at 4/15/2023 1321    Comment: Educated patient on fall precautions and Home safety    Acceptance, E,TB, VU by CM at 4/14/2023 1411    Comment: OT POC, role of OT, d/c recommendations, NWB status   Family Acceptance, E,TB, VU by CM at 4/14/2023 1411    Comment: OT POC, role of OT, d/c recommendations, NWB status                               User Key     Initials Effective Dates Name Provider Type Discipline    LW 06/16/21 -  Oanh Pacheco COTA Occupational Therapist Assistant OT    CM 11/18/22 -  Sameera Kang OT Occupational Therapist OT              OT Recommendation and Plan     Plan of Care Review  Plan of Care Reviewed With: patient  Progress: improving  Outcome Evaluation: Patient long sitting in bed upon arrival to room. Agreed to sit EOB with Mod A 1 person assist. Grooming Independent. UB bathing and dressing SBA. LB bathing SBA. LB dressing mod A. Educated patient on fall precautions and Home safety. Pt sitting EOB ~60 minutes prior to PTs arrival to room. All needs in reach. PT with Patient EOB. Pt could continue to benefit from OT services.     Time Calculation:    Time Calculation- OT     Row Name 04/15/23 1322             Time Calculation- OT    OT Start Time 0915  -LW      OT Stop Time 1029  -LW      OT Time Calculation (min) 74 min  -LW      Total Timed Code Minutes- OT 74 minute(s)  -LW      OT Received On 04/15/23  -LW         Timed Charges    45188 - OT Self Care/Mgmt Minutes 74  -LW         Total Minutes    Timed Charges Total Minutes 74  -LW       Total Minutes 74  -LW            User Key  (r) = Recorded By, (t) = Taken  By, (c) = Cosigned By    Initials Name Provider Type    LW Oanh Pacheco COTA Occupational Therapist Assistant              Therapy Charges for Today     Code Description Service Date Service Provider Modifiers Qty    [id number] HC OT SELF CARE/MGMT/TRAIN EA 15 MIN 4/15/2023 Oanh Pacheco COTA GO 5               ZIGGY Kelly  4/15/2023

## 2023-04-16 LAB
GLUCOSE BLDC GLUCOMTR-MCNC: 109 MG/DL (ref 70–130)
GLUCOSE BLDC GLUCOMTR-MCNC: 125 MG/DL (ref 70–130)
GLUCOSE BLDC GLUCOMTR-MCNC: 153 MG/DL (ref 70–130)
GLUCOSE BLDC GLUCOMTR-MCNC: 156 MG/DL (ref 70–130)

## 2023-04-16 PROCEDURE — 82962 GLUCOSE BLOOD TEST: CPT

## 2023-04-16 PROCEDURE — 97110 THERAPEUTIC EXERCISES: CPT

## 2023-04-16 PROCEDURE — 63710000001 LOSARTAN 25 MG TABLET: Performed by: PODIATRIST

## 2023-04-16 PROCEDURE — A9270 NON-COVERED ITEM OR SERVICE: HCPCS | Performed by: PODIATRIST

## 2023-04-16 PROCEDURE — 25010000002 HEPARIN (PORCINE) PER 1000 UNITS: Performed by: PODIATRIST

## 2023-04-16 PROCEDURE — 25010000002 AMPICILLIN-SULBACTAM PER 1.5 G: Performed by: PODIATRIST

## 2023-04-16 PROCEDURE — 63710000001 CETIRIZINE 10 MG TABLET: Performed by: PODIATRIST

## 2023-04-16 PROCEDURE — 63710000001 MAGNESIUM OXIDE 400 (240 MG) MG TABLET: Performed by: PODIATRIST

## 2023-04-16 PROCEDURE — 63710000001 METFORMIN 500 MG TABLET: Performed by: PODIATRIST

## 2023-04-16 PROCEDURE — 63710000001 METOPROLOL SUCCINATE XL 25 MG TABLET SUSTAINED-RELEASE 24 HOUR: Performed by: PODIATRIST

## 2023-04-16 PROCEDURE — 63710000001 ACETAMINOPHEN 325 MG TABLET: Performed by: PODIATRIST

## 2023-04-16 PROCEDURE — 63710000001 SENNOSIDES-DOCUSATE 8.6-50 MG TABLET: Performed by: PODIATRIST

## 2023-04-16 PROCEDURE — 97535 SELF CARE MNGMENT TRAINING: CPT

## 2023-04-16 PROCEDURE — 99024 POSTOP FOLLOW-UP VISIT: CPT | Performed by: PODIATRIST

## 2023-04-16 PROCEDURE — 97530 THERAPEUTIC ACTIVITIES: CPT

## 2023-04-16 PROCEDURE — 63710000001 GLIPIZIDE 5 MG TABLET: Performed by: PODIATRIST

## 2023-04-16 PROCEDURE — 25010000002 VANCOMYCIN 10 G RECONSTITUTED SOLUTION: Performed by: PODIATRIST

## 2023-04-16 RX ADMIN — METFORMIN HYDROCHLORIDE 1000 MG: 500 TABLET, FILM COATED ORAL at 09:24

## 2023-04-16 RX ADMIN — LOSARTAN POTASSIUM 25 MG: 25 TABLET, FILM COATED ORAL at 09:24

## 2023-04-16 RX ADMIN — CETIRIZINE HYDROCHLORIDE 10 MG: 10 TABLET, FILM COATED ORAL at 09:24

## 2023-04-16 RX ADMIN — Medication 400 MG: at 20:18

## 2023-04-16 RX ADMIN — GLIPIZIDE 10 MG: 5 TABLET ORAL at 09:24

## 2023-04-16 RX ADMIN — CHOLECALCIFEROL TAB 125 MCG (5000 UNIT) 5000 UNITS: 125 TAB at 20:18

## 2023-04-16 RX ADMIN — VANCOMYCIN HYDROCHLORIDE 1750 MG: 10 INJECTION, POWDER, LYOPHILIZED, FOR SOLUTION INTRAVENOUS at 13:21

## 2023-04-16 RX ADMIN — SODIUM CHLORIDE 100 ML/HR: 9 INJECTION, SOLUTION INTRAVENOUS at 20:16

## 2023-04-16 RX ADMIN — METOPROLOL SUCCINATE 25 MG: 25 TABLET, FILM COATED, EXTENDED RELEASE ORAL at 09:24

## 2023-04-16 RX ADMIN — HEPARIN SODIUM 5000 UNITS: 5000 INJECTION INTRAVENOUS; SUBCUTANEOUS at 05:55

## 2023-04-16 RX ADMIN — VANCOMYCIN HYDROCHLORIDE 1750 MG: 10 INJECTION, POWDER, LYOPHILIZED, FOR SOLUTION INTRAVENOUS at 01:37

## 2023-04-16 RX ADMIN — ACETAMINOPHEN 650 MG: 325 TABLET ORAL at 17:26

## 2023-04-16 RX ADMIN — DOCUSATE SODIUM 50 MG AND SENNOSIDES 8.6 MG 2 TABLET: 8.6; 5 TABLET, FILM COATED ORAL at 09:24

## 2023-04-16 RX ADMIN — AMPICILLIN SODIUM AND SULBACTAM SODIUM 3 G: 2; 1 INJECTION, POWDER, FOR SOLUTION INTRAMUSCULAR; INTRAVENOUS at 05:55

## 2023-04-16 RX ADMIN — ACETAMINOPHEN 650 MG: 325 TABLET ORAL at 09:24

## 2023-04-16 RX ADMIN — METOPROLOL SUCCINATE 25 MG: 25 TABLET, FILM COATED, EXTENDED RELEASE ORAL at 20:18

## 2023-04-16 RX ADMIN — Medication 10 ML: at 09:25

## 2023-04-16 RX ADMIN — DRONEDARONE 400 MG: 400 TABLET, FILM COATED ORAL at 20:17

## 2023-04-16 RX ADMIN — AMPICILLIN SODIUM AND SULBACTAM SODIUM 3 G: 2; 1 INJECTION, POWDER, FOR SOLUTION INTRAMUSCULAR; INTRAVENOUS at 13:21

## 2023-04-16 RX ADMIN — HEPARIN SODIUM 5000 UNITS: 5000 INJECTION INTRAVENOUS; SUBCUTANEOUS at 20:17

## 2023-04-16 RX ADMIN — AMPICILLIN SODIUM AND SULBACTAM SODIUM 3 G: 2; 1 INJECTION, POWDER, FOR SOLUTION INTRAMUSCULAR; INTRAVENOUS at 00:42

## 2023-04-16 RX ADMIN — OXYCODONE HYDROCHLORIDE AND ACETAMINOPHEN 1000 MG: 500 TABLET ORAL at 20:17

## 2023-04-16 RX ADMIN — METFORMIN HYDROCHLORIDE 1000 MG: 500 TABLET, FILM COATED ORAL at 17:26

## 2023-04-16 RX ADMIN — AMPICILLIN SODIUM AND SULBACTAM SODIUM 3 G: 2; 1 INJECTION, POWDER, FOR SOLUTION INTRAMUSCULAR; INTRAVENOUS at 18:05

## 2023-04-16 RX ADMIN — Medication 400 MG: at 09:24

## 2023-04-16 NOTE — PROGRESS NOTES
[facility] Medicine Services  INPATIENT PROGRESS NOTE    Length of Stay: 0  Date of Admission: 4/13/2023  Primary Care Physician: Jamaal Bravo MD    Subjective   Chief Complaint: Opinion on diabetes  HPI: Foot pain is extremely well controlled.  Does continue to have chronic hip and shoulder pain.    As of today, 04/16/23  Review of Systems   Constitutional: Negative for appetite change, chills, fatigue and fever.   Respiratory: Negative for chest tightness and shortness of breath.    Cardiovascular: Negative for chest pain, palpitations and leg swelling.   Gastrointestinal: Negative for abdominal pain, constipation, diarrhea, nausea and vomiting.   Musculoskeletal:        Shoulder pain, hip pain, foot pain   Skin: Negative for wound.   Neurological: Negative for dizziness, weakness, light-headedness, numbness and headaches.        All pertinent negatives and positives are as above. All other systems have been reviewed and are negative unless otherwise stated.    Objective    Temp:  [97.8 °F (36.6 °C)-99.6 °F (37.6 °C)] 98.7 °F (37.1 °C)  Heart Rate:  [85-99] 85  Resp:  [16-18] 18  BP: (113-131)/(68-70) 118/70    AM-PAC 6 Clicks Score (PT): 8 (04/15/23 1932)    As of today, 04/16/23  Physical Exam  Vitals reviewed.   Constitutional:       Appearance: He is well-developed.   HENT:      Head: Normocephalic and atraumatic.   Eyes:      Pupils: Pupils are equal, round, and reactive to light.   Cardiovascular:      Rate and Rhythm: Normal rate and regular rhythm.      Heart sounds: Normal heart sounds. No murmur heard.    No friction rub. No gallop.   Pulmonary:      Effort: Pulmonary effort is normal. No respiratory distress.      Breath sounds: Normal breath sounds. No wheezing or rales.   Chest:      Chest wall: No tenderness.   Abdominal:      General: Bowel sounds are normal. There is no distension.      Palpations: Abdomen is soft.      Tenderness: There is  no abdominal tenderness.   Musculoskeletal:      Cervical back: Normal range of motion and neck supple.   Feet:      Comments: Left foot in external fixator  Psychiatric:         Behavior: Behavior normal.           Results Review:  I have reviewed the labs, radiology results, and diagnostic studies.    Laboratory Data:   Results from last 7 days   Lab Units 04/15/23  0611 04/14/23  0627 04/13/23 2146 04/13/23  1100   SODIUM mmol/L 138 137 136 134*   POTASSIUM mmol/L 4.3 4.9 5.0 4.5   CHLORIDE mmol/L 103 101 101 97*   CO2 mmol/L 26.0 25.0 19.0* 23.0   BUN mg/dL 14 23 28* 27*   CREATININE mg/dL 0.94 1.13 1.34* 1.10   GLUCOSE mg/dL 147* 172* 220* 149*   CALCIUM mg/dL 8.3* 8.1* 8.1* 9.1   BILIRUBIN mg/dL  --   --   --  0.7   ALK PHOS U/L  --   --   --  110   ALT (SGPT) U/L  --   --   --  37   AST (SGOT) U/L  --   --   --  29   ANION GAP mmol/L 9.0 11.0 16.0* 14.0     Estimated Creatinine Clearance: 138.9 mL/min (by C-G formula based on SCr of 0.94 mg/dL).          Results from last 7 days   Lab Units 04/15/23  0611 04/14/23 0627 04/13/23 2146   WBC 10*3/mm3 5.99 7.71 8.99   HEMOGLOBIN g/dL 10.2* 10.7* 12.5*   HEMATOCRIT % 31.3* 32.7* 38.0   PLATELETS 10*3/mm3 115* 139* 132*           Culture Data:   No results found for: BLOODCX  No results found for: URINECX  No results found for: RESPCX  No results found for: WOUNDCX  No results found for: STOOLCX  No components found for: BODYFLD    Radiology Data:   Imaging Results (Last 24 Hours)     ** No results found for the last 24 hours. **          I have utilized all available immediate resources to obtain, update, or review the patient's current medications (including all prescriptions, over-the-counter products, herbals, cannabis/cannabidiol products, and vitamin/mineral/dietary (nutritional) supplements).       Assessment/Plan     Active Hospital Problems    Diagnosis    • **Cellulitis of left foot    • Charcot's joint of left foot    • Type 2 diabetes mellitus with  skin complication        Plan:  1.  Diabetes mellitus type 2: Currently extremely well controlled.  Continue with current treatment.  2.  Charcot foot  3.  Cellulitis of the left foot: Continue antibiotics per podiatry.      Medical Decision Making  Number and Complexity of problems: Several highly complex medical problems    Conditions and Status:        Condition is unchanged.     Discussed with: Patient and nursing     Treatment Plan  As above    Care Planning  Shared decision making: Patient agreeable to plan of care  Code status and discussions: Full code    Disposition  Per primary team  Hospital service.  At this point.  Please feel free to call with any further questions.  Thank you.    The patient was evaluated during the global COVID-19 pandemic, and the diagnosis was suspected/considered upon their initial presentation.  Evaluation, treatment, and testing were consistent with current guidelines for patients who present with complaints or symptoms that may be related to COVID-19.    I confirmed that the patient's Advance Care Plan is present, code status is documented, or surrogate decision maker is listed in the patient's medical record.        This document has been electronically signed by Adis Castellon MD on April 16, 2023 10:56 CDT

## 2023-04-16 NOTE — THERAPY TREATMENT NOTE
Patient Name: Emre Lisa  : 1960    MRN: [id number]                              Today's Date: 2023       Admit Date: 2023    Visit Dx:     ICD-10-CM ICD-9-CM   1. Cellulitis of left foot  L03.116 682.7   2. Charcot's joint of left foot  M14.672 094.0     713.5   3. Type 2 diabetes mellitus with foot ulcer, unspecified whether long term insulin use  E11.621 250.80    L97.509 707.15   4. Impaired functional mobility, balance, gait, and endurance  Z74.09 V49.89   5. Impaired mobility and ADLs  Z74.09 V49.89    Z78.9      Patient Active Problem List   Diagnosis   • Foot osteomyelitis, right   • Nodule of groin   • Type 2 diabetes mellitus with skin complication   • Status post transmetatarsal amputation of right foot   • Hammer toe of left foot   • Hallux malleus of left foot   • Cellulitis of left foot   • Infected hardware in left leg   • Chronic multifocal osteomyelitis of left foot   • Charcot's joint of left foot   • Skin ulcer of left foot, limited to breakdown of skin     Past Medical History:   Diagnosis Date   • Arthritis    • Atrial fibrillation    • Diabetes mellitus    • Diabetic foot ulcer associated with type 2 diabetes mellitus     left great toe   • Hallux malleus of left foot    • Hammer toe    • Hypertension    • MI (myocardial infarction)    • Neuropathy in diabetes    • Onychomycosis    • Presence of biventricular cardiac pacemaker    • Rheumatoid arthritis      Past Surgical History:   Procedure Laterality Date   • AMPUTATION DIGIT Right 3/5/2017    Procedure: RIGHT AMPUTATION TRANSMETATRASAL , RECESSION GASTROCNEMOUS;  Surgeon: Marco A Thompson DPM;  Location: [facility];  Service:    • CARDIAC DEFIBRILLATOR PLACEMENT  ,    • CARPAL TUNNEL RELEASE  2015    elbow and wrist left arm   • FOOT IRRIGATION, DEBRIDEMENT AND REPAIR Left 3/7/2018    Procedure: FOOT IRRIGATION, DEBRIDEMENT AND REPAIR;  Surgeon: Marco A Thompson DPM;  Location: [facility];  Service:    • FOOT  IRRIGATION, DEBRIDEMENT AND REPAIR Left 3/10/2018    Procedure: FOOT IRRIGATION, DEBRIDEMENT AND REPAIR;  Surgeon: Marco A Thompson DPM;  Location: [facility] OR;  Service: Podiatry   • FOOT WOUND CLOSURE Right 3/2/2017    Procedure: INCISION AND DRAINAGE, RIGHT FOOT;  Surgeon: Tung Rosenthal DPM;  Location: [facility] OR;  Service:    • HAMMER TOE REPAIR Left 2/15/2018    Procedure: HAMMERTOE CORRECTION LEFT SECOND, THIRD AND FOURTH TOES AND HALLUX INTERPHALANGEAL JOINT ARTHRODESIS LEFT FOOT (Micro Asnis, 4.0 & 5.0 Asnis)      (c-arm);  Surgeon: Marco A Thompson DPM;  Location: [facility] OR;  Service:    • HARDWARE REMOVAL Left 3/5/2018    Procedure: ANKLE/FOOT HARDWARE REMOVAL;  Surgeon: Marco A Thompson DPM;  Location: [facility] OR;  Service:    • INCISION AND DRAINAGE LEG Left 3/5/2018    Procedure: INCISION AND DRAINAGE LOWER EXTREMITY;  Surgeon: Marco A Thompson DPM;  Location: [facility] OR;  Service:    • PLANTAR FASCIA RELEASE Left 11/21/2019    Procedure: PLANTAR PLANING LEFT FOOT AND ALL OTHER INDICATED PROCEDURES;  Surgeon: Marco A Thompson DPM;  Location: [facility];  Service: Podiatry   • TOE AMPUTATION Right 2016   • TONSILECTOMY, ADENOIDECTOMY, BILATERAL MYRINGOTOMY AND TUBES      age 25      General Information     Row Name 04/16/23 1250          OT Time and Intention    Document Type therapy note (daily note)  -LW     Mode of Treatment individual therapy;occupational therapy  -     Row Name 04/16/23 1250          General Information    Patient Profile Reviewed yes  -LW     Existing Precautions/Restrictions fall;non-weight bearing;left  -     Row Name 04/16/23 1250          Cognition    Orientation Status (Cognition) oriented x 4  -     Row Name 04/16/23 1250          Safety Issues, Functional Mobility    Impairments Affecting Function (Mobility) pain;strength  -LW           User Key  (r) = Recorded By, (t) = Taken By, (c) = Cosigned By    Initials Name Provider Type    LW Oanh Pacheco COTA Occupational  Therapist Assistant                 Mobility/ADL's     Row Name 04/16/23 1251          Bed Mobility    Bed Mobility supine-sit  -     Supine-Sit [place] (Bed Mobility) 1 person assist;moderate assist (50% patient effort)  -     Assistive Device (Bed Mobility) bed rails;head of bed elevated  -     Row Name 04/16/23 1251          Transfers    Transfers sit-stand transfer;stand-sit transfer;bed-chair transfer  -     Row Name 04/16/23 1251          Bed-Chair Transfer    Bed-Chair [place] (Transfers) moderate assist (50% patient effort);1 person assist;maximum assist (25% patient effort)  -     Assistive Device (Bed-Chair Transfers) walker, front-wheeled  -     Row Name 04/16/23 1251          Sit-Stand Transfer    Sit-Stand [place] (Transfers) moderate assist (50% patient effort)  -     Assistive Device (Sit-Stand Transfers) walker, front-wheeled  -     Row Name 04/16/23 1251          Stand-Sit Transfer    Stand-Sit [place] (Transfers) other (see comments);1 person assist  -     Assistive Device (Stand-Sit Transfers) walker, front-wheeled  -     Row Name 04/16/23 1251          Activities of Daily Living    BADL Assessment/Intervention bathing;upper body dressing;lower body dressing;grooming;toileting  -     Row Name 04/16/23 1251          Mobility    Extremity Weight-bearing Status left lower extremity  -     Left Lower Extremity (Weight-bearing Status) non weight-bearing (NWB)  -     Row Name 04/16/23 1251          Bathing Assessment/Intervention    [place] Level (Bathing) bathing skills;lower body;upper body;standby assist;distal lower extremities/feet;maximum assist (25% patient effort)  -     Position (Bathing) edge of bed sitting  -     Row Name 04/16/23 1251          Upper Body Dressing Assessment/Training    [place] Level (Upper Body Dressing) upper body dressing skills;doff;don;standby assist  Hospital gown  -     Position (Upper Body Dressing) edge of  bed sitting  -LW     Row Name 04/16/23 1251          Lower Body Dressing Assessment/Training    [place] Level (Lower Body Dressing) lower body dressing skills;doff;don;socks;moderate assist (50% patient effort)  -LW     Position (Lower Body Dressing) edge of bed sitting  -LW     Row Name 04/16/23 1251          Grooming Assessment/Training    [place] Level (Grooming) grooming skills;hair care, combing/brushing;oral care regimen;shave face;wash face, hands;standby assist  -LW     Position (Grooming) edge of bed sitting  -LW     Comment, (Grooming) Pan washed hair  -LW     Row Name 04/16/23 1251          Toileting Assessment/Training    [place] Level (Toileting) adjust/manage clothing;perform perineal hygiene;contact guard assist  -LW     Assistive Devices (Toileting) urinal  -LW     Position (Toileting) edge of bed sitting  -LW           User Key  (r) = Recorded By, (t) = Taken By, (c) = Cosigned By    Initials Name Provider Type    Oanh Trevino COTA Occupational Therapist Assistant               Obj/Interventions    No documentation.                Goals/Plan     Row Name 04/16/23 0710          Transfer Goal 1 (OT)    Activity/Assistive Device (Transfer Goal 1, OT) sit-to-stand/stand-to-sit;bed-to-chair/chair-to-bed;commode, bedside with drop arms  -LW     [place] Level/Cues Needed (Transfer Goal 1, OT) moderate assist (50-74% patient effort)  -LW     Time Frame (Transfer Goal 1, OT) long term goal (LTG);by discharge  -LW     Progress/Outcome (Transfer Goal 1, OT) goal not met  -     Row Name 04/16/23 0710          Bathing Goal 1 (OT)    Activity/Device (Bathing Goal 1, OT) lower body bathing  -LW     [place] Level/Cues Needed (Bathing Goal 1, OT) moderate assist (50-74% patient effort)  -LW     Time Frame (Bathing Goal 1, OT) long term goal (LTG);by discharge  -LW     Progress/Outcomes (Bathing Goal 1, OT) goal not met  -     Row Name 04/16/23 0710          Dressing Goal 1 (OT)     Activity/Device (Dressing Goal 1, OT) upper body dressing  -LW     [place]/Cues Needed (Dressing Goal 1, OT) set-up required  -LW     Time Frame (Dressing Goal 1, OT) long term goal (LTG);by discharge  -LW     Progress/Outcome (Dressing Goal 1, OT) goal not met;good progress toward goal  -LW     Row Name 04/16/23 0710          Toileting Goal 1 (OT)    Activity/Device (Toileting Goal 1, OT) toileting skills, all;adjust/manage clothing;perform perineal hygiene;commode, 3-in-1  -LW     [place] Level/Cues Needed (Toileting Goal 1, OT) minimum assist (75% or more patient effort)  -LW     Time Frame (Toileting Goal 1, OT) long term goal (LTG);by discharge  -LW     Progress/Outcome (Toileting Goal 1, OT) goal not met  -LW           User Key  (r) = Recorded By, (t) = Taken By, (c) = Cosigned By    Initials Name Provider Type    LW Oanh Pacheco COTA Occupational Therapist Assistant               Clinical Impression     Row Name 04/16/23 1255          Pain Assessment    Pretreatment Pain Rating 0/10 - no pain  -LW     Posttreatment Pain Rating 0/10 - no pain  -LW     Row Name 04/16/23 1255          Plan of Care Review    Plan of Care Reviewed With patient  -LW     Progress improving  -LW     Outcome Evaluation Patient supine in bed. Supine to sit Mod A. Grooming SBA. UB bathing and dressing SBA. LB bathing and dressin SBA. (distal extremities Mod to Max A.). Toileting CGA. Pt sit to stand Mod to Max A. T/f to bschair Mod A. Pt needing increased time for tasks. Pt sitting in bschair upon exit from room. All needs in reach. Spouse present. Pt could continue to benefit from OT services.  -LW     Row Name 04/16/23 1255          Positioning and Restraints    Pre-Treatment Position in bed  -LW     Post Treatment Position chair  -LW     In Chair sitting;call light within reach;encouraged to call for assist;exit alarm on;notified nsg;with family/caregiver  -LW           User Key  (r) = Recorded By, (t) = Taken By,  (c) = Cosigned By    Initials Name Provider Type    Oanh Trevino COTA Occupational Therapist Assistant               Outcome Measures     Row Name 04/16/23 1259          How much help from another is currently needed...    Putting on and taking off regular lower body clothing? 1  -LW     Bathing (including washing, rinsing, and drying) 3  -LW     Toileting (which includes using toilet bed pan or urinal) 2  -LW     Putting on and taking off regular upper body clothing 3  -LW     Taking care of personal grooming (such as brushing teeth) 4  -LW     Eating meals 4  -LW     AM-PAC 6 Clicks Score (OT) 17  -LW     Row Name 04/16/23 1200          How much help from another person do you currently need...    Turning from your back to your side while in flat bed without using bedrails? 2  -TA     Moving from lying on back to sitting on the side of a flat bed without bedrails? 2  -TA     Moving to and from a bed to a chair (including a wheelchair)? 1  -TA     Standing up from a chair using your arms (e.g., wheelchair, bedside chair)? 2  -TA     Climbing 3-5 steps with a railing? 1  -TA     To walk in hospital room? 1  -TA     AM-PAC 6 Clicks Score (PT) 9  -TA     Highest level of mobility 3 --> Sat at edge of bed  -TA     Row Name 04/16/23 1200          Functional Assessment    Outcome Measure Options AM-PAC 6 Clicks Basic Mobility (PT)  -TA           User Key  (r) = Recorded By, (t) = Taken By, (c) = Cosigned By    Initials Name Provider Type    Magda Jimenez PTA Physical Therapist Assistant    Oanh Trevino COTA Occupational Therapist Assistant                Occupational Therapy Education     Title: PT OT SLP Therapies (In Progress)     Topic: Occupational Therapy (Done)     Point: ADL training (Done)     Description:   Instruct learner(s) on proper safety adaptation and remediation techniques during self care or transfers.   Instruct in proper use of assistive devices.              Learning Progress  Summary           Patient Acceptance, E,TB, VU by LW at 4/16/2023 1259    Acceptance, E,TB,H, VU by LW at 4/15/2023 1321    Comment: Educated patient on fall precautions and Home safety    Acceptance, E,TB, VU by CM at 4/14/2023 1411    Comment: OT POC, role of OT, d/c recommendations, NWB status   Family Acceptance, E,TB, VU by CM at 4/14/2023 1411    Comment: OT POC, role of OT, d/c recommendations, NWB status                   Point: Home exercise program (Done)     Description:   Instruct learner(s) on appropriate technique for monitoring, assisting and/or progressing therapeutic exercises/activities.              Learning Progress Summary           Patient Acceptance, E,TB, VU by LW at 4/16/2023 1259    Acceptance, E,TB,H, VU by LW at 4/15/2023 1321    Comment: Educated patient on fall precautions and Home safety                   Point: Precautions (Done)     Description:   Instruct learner(s) on prescribed precautions during self-care and functional transfers.              Learning Progress Summary           Patient Acceptance, E,TB, VU by LW at 4/16/2023 1259    Acceptance, E,TB,H, VU by LW at 4/15/2023 1321    Comment: Educated patient on fall precautions and Home safety    Acceptance, E,TB, VU by CM at 4/14/2023 1411    Comment: OT POC, role of OT, d/c recommendations, NWB status   Family Acceptance, E,TB, VU by CM at 4/14/2023 1411    Comment: OT POC, role of OT, d/c recommendations, NWB status                   Point: Body mechanics (Done)     Description:   Instruct learner(s) on proper positioning and spine alignment during self-care, functional mobility activities and/or exercises.              Learning Progress Summary           Patient Acceptance, E,TB, VU by LW at 4/16/2023 1259    Acceptance, E,TB,H, VU by LW at 4/15/2023 1321    Comment: Educated patient on fall precautions and Home safety    Acceptance, E,TB, VU by CM at 4/14/2023 1411    Comment: OT POC, role of OT, d/c recommendations, NWB  status   Family Acceptance, E,TB, VU by CM at 4/14/2023 1411    Comment: OT POC, role of OT, d/c recommendations, NWB status                               User Key     Initials Effective Dates Name Provider Type Discipline    LW 06/16/21 -  Oanh Pacheco COTA Occupational Therapist Assistant OT    CM 11/18/22 -  Sameera Kang OT Occupational Therapist OT              OT Recommendation and Plan     Plan of Care Review  Plan of Care Reviewed With: patient  Progress: improving  Outcome Evaluation: Patient supine in bed. Supine to sit Mod A. Grooming SBA. UB bathing and dressing SBA. LB bathing and dressin SBA. (distal extremities Mod to Max A.). Toileting CGA. Pt sit to stand Mod to Max A. T/f to bschair Mod A. Pt needing increased time for tasks. Pt sitting in bschair upon exit from room. All needs in reach. Spouse present. Pt could continue to benefit from OT services.     Time Calculation:    Time Calculation- OT     Row Name 04/16/23 1300             Time Calculation- OT    OT Start Time 0710  -LW      OT Stop Time 0840  -LW      OT Time Calculation (min) 90 min  -LW      Total Timed Code Minutes- OT 90 minute(s)  -LW      OT Received On 04/16/23  -LW         Timed Charges    27926 - OT Self Care/Mgmt Minutes 90  -LW         Total Minutes    Timed Charges Total Minutes 90  -LW       Total Minutes 90  -LW            User Key  (r) = Recorded By, (t) = Taken By, (c) = Cosigned By    Initials Name Provider Type    LW Oanh Pacheco COTA Occupational Therapist Assistant              Therapy Charges for Today     Code Description Service Date Service Provider Modifiers Qty    37801388404 HC OT SELF CARE/MGMT/TRAIN EA 15 MIN 4/15/2023 Oanh Pacheco COTA GO 5    13078089353 HC OT SELF CARE/MGMT/TRAIN EA 15 MIN 4/16/2023 Oanh Pacheco COTA GO 6               ZIGGY Kelly  4/16/2023

## 2023-04-16 NOTE — PLAN OF CARE
Goal Outcome Evaluation:  Plan of Care Reviewed With: patient        Progress: improving  Outcome Evaluation: pt sitting in chair upon entry, pt performed sit<>stand x 2 with RW &  mod assist of 2. pt will require 24/7 care & continued PT services

## 2023-04-16 NOTE — PROGRESS NOTES
Podiatric Surgery Progress Note    Subjective     Post-Operative Day: 3 post-left reduction left ankle deformity with application of external fixator  Systemic or Specific Complaints: No Complaints    Objective     Vital signs in last 24 hours:  Temp:  [97.8 °F (36.6 °C)-99.6 °F (37.6 °C)] 98.7 °F (37.1 °C)  Heart Rate:  [85-99] 92  Resp:  [16-18] 18  BP: (112-131)/(67-70) 112/67    General: alert, appears stated age and cooperative   Neurovascular:  Sensation left lower extremity absent  Capillary refill: Normal   Wound:  dressing c/d/i. Drain intact with ~25 mL output   Range of Motion:  Absent secondary to frame application   DVT Exam: No evidence of DVT seen on physical exam.     Data Review  CBC:  Results from last 7 days   Lab Units 04/15/23  0611   WBC 10*3/mm3 5.99   RBC 10*6/mm3 3.38*   HEMOGLOBIN g/dL 10.2*   HEMATOCRIT % 31.3*   PLATELETS 10*3/mm3 115*       Assessment & Plan     Charcot left ankle    Patient doing well postoperatively. Continue heparin for DVT prophylaxis.  Continue IV antibiotics.  LTAC referral pending.  Nonweightbearing left lower extremity. Call with questions.     LOS: 0 days     Marco A Thompson DPM    Date: 4/16/2023  Time: 13:03 CDT

## 2023-04-16 NOTE — NURSING NOTE
Tylenol given x 1 for chronic shoulder pain,declines narcotic, pt states no pain in foot. Vitals stable.

## 2023-04-16 NOTE — THERAPY TREATMENT NOTE
Acute Care - Physical Therapy Treatment Note  [facility]     Patient Name: Emre Lisa  : 1960  MRN: [id number]  Today's Date: 2023      Visit Dx:     ICD-10-CM ICD-9-CM   1. Cellulitis of left foot  L03.116 682.7   2. Charcot's joint of left foot  M14.672 094.0     713.5   3. Type 2 diabetes mellitus with foot ulcer, unspecified whether long term insulin use  E11.621 250.80    L97.509 707.15   4. Impaired functional mobility, balance, gait, and endurance  Z74.09 V49.89   5. Impaired mobility and ADLs  Z74.09 V49.89    Z78.9      Patient Active Problem List   Diagnosis   • Foot osteomyelitis, right   • Nodule of groin   • Type 2 diabetes mellitus with skin complication   • Status post transmetatarsal amputation of right foot   • Hammer toe of left foot   • Hallux malleus of left foot   • Cellulitis of left foot   • Infected hardware in left leg   • Chronic multifocal osteomyelitis of left foot   • Charcot's joint of left foot   • Skin ulcer of left foot, limited to breakdown of skin     Past Medical History:   Diagnosis Date   • Arthritis    • Atrial fibrillation    • Diabetes mellitus    • Diabetic foot ulcer associated with type 2 diabetes mellitus     left great toe   • Hallux malleus of left foot    • Hammer toe    • Hypertension    • MI (myocardial infarction)    • Neuropathy in diabetes    • Onychomycosis    • Presence of biventricular cardiac pacemaker    • Rheumatoid arthritis      Past Surgical History:   Procedure Laterality Date   • AMPUTATION DIGIT Right 3/5/2017    Procedure: RIGHT AMPUTATION TRANSMETATRASAL , RECESSION GASTROCNEMOUS;  Surgeon: Marco A Thompson DPM;  Location: [facility];  Service:    • CARDIAC DEFIBRILLATOR PLACEMENT  ,    • CARPAL TUNNEL RELEASE  2015    elbow and wrist left arm   • FOOT IRRIGATION, DEBRIDEMENT AND REPAIR Left 3/7/2018    Procedure: FOOT IRRIGATION, DEBRIDEMENT AND REPAIR;  Surgeon: Marco A Thompson DPM;  Location: [facility];  Service:     • FOOT IRRIGATION, DEBRIDEMENT AND REPAIR Left 3/10/2018    Procedure: FOOT IRRIGATION, DEBRIDEMENT AND REPAIR;  Surgeon: Marco A Thompson DPM;  Location: [facility] OR;  Service: Podiatry   • FOOT WOUND CLOSURE Right 3/2/2017    Procedure: INCISION AND DRAINAGE, RIGHT FOOT;  Surgeon: Tung Rosenthal DPM;  Location: [facility] OR;  Service:    • HAMMER TOE REPAIR Left 2/15/2018    Procedure: HAMMERTOE CORRECTION LEFT SECOND, THIRD AND FOURTH TOES AND HALLUX INTERPHALANGEAL JOINT ARTHRODESIS LEFT FOOT (Micro Asnis, 4.0 & 5.0 Asnis)      (c-arm);  Surgeon: Marco A Thompson DPM;  Location: [facility] OR;  Service:    • HARDWARE REMOVAL Left 3/5/2018    Procedure: ANKLE/FOOT HARDWARE REMOVAL;  Surgeon: Marco A Thompson DPM;  Location: [facility] OR;  Service:    • INCISION AND DRAINAGE LEG Left 3/5/2018    Procedure: INCISION AND DRAINAGE LOWER EXTREMITY;  Surgeon: Marco A Thompson DPM;  Location: [facility] OR;  Service:    • PLANTAR FASCIA RELEASE Left 11/21/2019    Procedure: PLANTAR PLANING LEFT FOOT AND ALL OTHER INDICATED PROCEDURES;  Surgeon: Marco A Thompson DPM;  Location: [facility];  Service: Podiatry   • TOE AMPUTATION Right 2016   • TONSILECTOMY, ADENOIDECTOMY, BILATERAL MYRINGOTOMY AND TUBES      age 25     PT Assessment (last 12 hours)     PT Evaluation and Treatment     Row Name 04/16/23 1053          Physical Therapy Time and Intention    Subjective Information no complaints  -TA     Document Type therapy note (daily note)  -TA     Mode of Treatment physical therapy  -TA     Row Name 04/16/23 1053          General Information    Patient Profile Reviewed yes  -TA     Existing Precautions/Restrictions fall;non-weight bearing;left  -TA     Row Name 04/16/23 1053          Pain    Pretreatment Pain Rating 0/10 - no pain  -TA     Posttreatment Pain Rating 0/10 - no pain  -TA     Row Name 04/16/23 1053          Cognition    Orientation Status (Cognition) oriented x 4  -TA     Personal Safety Interventions fall prevention program  maintained;gait belt;muscle strengthening facilitated;nonskid shoes/slippers when out of bed;supervised activity  -TA     Row Name 04/16/23 1053          Range of Motion Comprehensive    General Range of Motion bilateral lower extremity ROM WFL  -TA     [facility] Name 04/16/23 1053          Strength Comprehensive (MMT)    General Manual Muscle Testing (MMT) Assessment lower extremity strength deficits identified  -TA     [facility] Name 04/16/23 1053          Mobility    Extremity Weight-bearing Status left lower extremity  -TA     Left Lower Extremity (Weight-bearing Status) non weight-bearing (NWB)  -TA     Row Name 04/16/23 1053          Bed Mobility    Bed Mobility rolling left;rolling right;supine-sit;sit-supine;scooting/bridging  -TA     Rolling Right [place] (Bed Mobility) not tested  -TA     Scooting/Bridging [place] (Bed Mobility) not tested  -TA     Supine-Sit [place] (Bed Mobility) not tested  -TA     Sit-Supine [place] (Bed Mobility) --  for L LE  -TA     Assistive Device (Bed Mobility) bed rails;head of bed elevated  -TA     Row Name 04/16/23 1053          Transfers    Transfers sit-stand transfer;stand-sit transfer  -TA     Row Name 04/16/23 1053          Sit-Stand Transfer    Sit-Stand [place] (Transfers) moderate assist (50% patient effort);2 person assist;verbal cues  x 2  -TA     Assistive Device (Sit-Stand Transfers) walker, front-wheeled  -TA     Row Name 04/16/23 1053          Stand-Sit Transfer    Stand-Sit [place] (Transfers) moderate assist (50% patient effort);2 person assist;verbal cues  x2  -TA     Assistive Device (Stand-Sit Transfers) walker, front-wheeled  -TA     Row Name 04/16/23 1053          Gait/Stairs (Locomotion)    [place] Level (Gait) not tested  -TA     Row Name 04/16/23 1053          Safety Issues, Functional Mobility    Impairments Affecting Function (Mobility) pain;strength  -TA     [facility] Name 04/16/23 1053          Hip (Therapeutic Exercise)    Hip  (Therapeutic Exercise) AROM (active range of motion)  -TA     Hip AROM (Therapeutic Exercise) right;flexion;aBduction;aDduction;sitting;15 repititions  -TA     Row Name 04/16/23 1053          Knee (Therapeutic Exercise)    Knee (Therapeutic Exercise) AROM (active range of motion)  -TA     Knee AROM (Therapeutic Exercise) right;LAQ (long arc quad);sitting;15 repititions  -TA     Row Name 04/16/23 1053          Ankle (Therapeutic Exercise)    Ankle (Therapeutic Exercise) AROM (active range of motion)  -TA     Ankle AROM (Therapeutic Exercise) bilateral;dorsiflexion;plantarflexion;sitting;20 repititions  -TA     Row Name             Wound 04/13/23 1908 Left anterior foot Incision    Wound - Properties Group Placement Date: 04/13/23  -LS Placement Time: 1908  -LS Present on Hospital Admission: N  -LS Side: Left  -LS Orientation: anterior  -LS Location: foot  -LS Primary Wound Type: Incision  -LS    Retired Wound - Properties Group Placement Date: 04/13/23  -LS Placement Time: 1908  -LS Present on Hospital Admission: N  -LS Side: Left  -LS Orientation: anterior  -LS Location: foot  -LS Primary Wound Type: Incision  -LS    Retired Wound - Properties Group Date first assessed: 04/13/23  -LS Time first assessed: 1908  -LS Present on Hospital Admission: N  -LS Side: Left  -LS Location: foot  -LS Primary Wound Type: Incision  -LS    Row Name             Wound 04/14/23 2157 Left medial gluteal    Wound - Properties Group Placement Date: 04/14/23  -ML Placement Time: 2157  -ML Present on Hospital Admission: Y  -ML, it was passed on in report pt came in with wound, pt stated wound was present on admission  Side: Left  -ML Orientation: medial  -ML Location: gluteal  -ML    Retired Wound - Properties Group Placement Date: 04/14/23  -ML Placement Time: 2157  -ML Present on Hospital Admission: Y  -ML, it was passed on in report pt came in with wound, pt stated wound was present on admission  Side: Left  -ML Orientation: medial  -ML  Location: gluteal  -ML    Retired Wound - Properties Group Date first assessed: 04/14/23  -ML Time first assessed: 2157  -ML Present on Hospital Admission: Y  -ML, it was passed on in report pt came in with wound, pt stated wound was present on admission  Side: Left  -ML Location: gluteal  -ML    Row Name 04/16/23 1053          Plan of Care Review    Plan of Care Reviewed With patient  -TA     Outcome Evaluation pt sitting in chair upon entry, pt performed sit<>stand x 2 with RW &  mod assist of 2. pt will require 24/7 care & continued PT services  -TA     Row Name 04/16/23 1053          Vital Signs    Pre Systolic BP Rehab 112  -TA     Pre Treatment Diastolic BP 67  -TA     Post Systolic BP Rehab 123  -TA     Post Treatment Diastolic BP 67  -TA     Pretreatment Heart Rate (beats/min) 92  -TA     Posttreatment Heart Rate (beats/min) 90  -TA     Pre SpO2 (%) 94  -TA     O2 Delivery Pre Treatment supplemental O2  -TA     Post SpO2 (%) 95  -TA     O2 Delivery Post Treatment supplemental O2  -TA     Pre Patient Position Sitting  -TA     Post Patient Position Sitting  -TA     Row Name 04/16/23 1053          Positioning and Restraints    Pre-Treatment Position sitting in chair/recliner  -TA     Post Treatment Position chair  -TA     In Bed sitting;call light within reach;LLE elevated  -TA     Row Name 04/16/23 1053          Therapy Assessment/Plan (PT)    Rehab Potential (PT) good, to achieve stated therapy goals  -TA     Criteria for Skilled Interventions Met (PT) yes;meets criteria;skilled treatment is necessary  -TA     Therapy Frequency (PT) daily  -TA     Comment, Therapy Assessment/Plan (PT) continue  -TA     Row Name 04/16/23 1053          Bed Mobility Goal 1 (PT)    Activity/Assistive Device (Bed Mobility Goal 1, PT) bed mobility activities, all  -TA     [place] Level/Cues Needed (Bed Mobility Goal 1, PT) moderate assist (50-74% patient effort)  -TA     Time Frame (Bed Mobility Goal 1, PT) by discharge  -TA      Strategies/Barriers (Bed Mobility Goal 1, PT) while maintaining WB precautions  -TA     Progress/Outcomes (Bed Mobility Goal 1, PT) new goal;goal not met  -TA     Row Name 04/16/23 1053          Transfer Goal 1 (PT)    Activity/Assistive Device (Transfer Goal 1, PT) sit-to-stand/stand-to-sit;bed-to-chair/chair-to-bed  -TA     [place] Level/Cues Needed (Transfer Goal 1, PT) moderate assist (50-74% patient effort)  -TA     Time Frame (Transfer Goal 1, PT) by discharge  -TA     Strategies/Barriers (Transfers Goal 1, PT) While maintaining WB precautions  -TA     Progress/Outcome (Transfer Goal 1, PT) new goal;goal not met  -TA     Row Name 04/16/23 1053          Problem Specific Goal 1 (PT)    Problem Specific Goal 1 (PT) Improve functional LE strength on the R to be able to complete x5 STS w/modA to maxA  -TA     Time Frame (Problem Specific Goal 1, PT) by discharge  -TA           User Key  (r) = Recorded By, (t) = Taken By, (c) = Cosigned By    Initials Name Provider Type    Jennifer Diallo, RN Registered Nurse    Magda Jimenez, CHENTE Physical Therapist Assistant    Kranthi Swartz RN Registered Nurse                Physical Therapy Education     Title: PT OT SLP Therapies (In Progress)     Topic: Physical Therapy (In Progress)     Point: Mobility training (Done)     Learning Progress Summary           Patient Acceptance, E, VU by  at 4/14/2023 1242    Comment: POC and goals   Significant Other Acceptance, E, VU by  at 4/14/2023 1242    Comment: POC and goals                   Point: Home exercise program (Not Started)     Learner Progress:  Not documented in this visit.          Point: Body mechanics (Not Started)     Learner Progress:  Not documented in this visit.          Point: Precautions (Done)     Learning Progress Summary           Patient Acceptance, E, VU by  at 4/14/2023 1242    Comment: POC and goals   Significant Other Acceptance, E, VU by  at 4/14/2023 1242    Comment:  POC and goals                               User Key     Initials Effective Dates Name Provider Type Discipline     01/09/23 -  Yanet Vega, NESTOR Physical Therapist PT              PT Recommendation and Plan  Anticipated Discharge Disposition (PT): inpatient rehabilitation facility  Therapy Frequency (PT): daily  Plan of Care Reviewed With: patient  Progress: improving  Outcome Evaluation: pt sitting in chair upon entry, pt performed sit<>stand x 2 with RW &  mod assist of 2. pt will require 24/7 care & continued PT services   Outcome Measures     Row Name 04/16/23 1200 04/15/23 1100          How much help from another person do you currently need...    Turning from your back to your side while in flat bed without using bedrails? 2  -TA 3  -TA     Moving from lying on back to sitting on the side of a flat bed without bedrails? 2  -TA 2  -TA     Moving to and from a bed to a chair (including a wheelchair)? 1  -TA 1  -TA     Standing up from a chair using your arms (e.g., wheelchair, bedside chair)? 2  -TA 1  -TA     Climbing 3-5 steps with a railing? 1  -TA 1  -TA     To walk in hospital room? 1  -TA 1  -TA     AM-PAC 6 Clicks Score (PT) 9  -TA 9  -TA        Functional Assessment    Outcome Measure Options AM-PAC 6 Clicks Basic Mobility (PT)  -TA AM-PAC 6 Clicks Basic Mobility (PT)  -TA           User Key  (r) = Recorded By, (t) = Taken By, (c) = Cosigned By    Initials Name Provider Type    TA Magda Rader, PTA Physical Therapist Assistant                 Time Calculation:    PT Charges     Row Name 04/16/23 1235             Time Calculation    Start Time 1053  -TA      Stop Time 1133  -TA      Time Calculation (min) 40 min  -TA      PT Received On 04/16/23  -TA         Time Calculation- PT    Total Timed Code Minutes- PT 40 minute(s)  -TA         Timed Charges    31304 - PT Therapeutic Exercise Minutes 15  -TA      83541 - PT Therapeutic Activity Minutes 25  -TA         Total Minutes    Timed Charges Total  Minutes 40  -TA       Total Minutes 40  -TA            User Key  (r) = Recorded By, (t) = Taken By, (c) = Cosigned By    Initials Name Provider Type    Magda Jimenez PTA Physical Therapist Assistant              Therapy Charges for Today     Code Description Service Date Service Provider Modifiers Qty    33090833988 HC PT THER PROC EA 15 MIN 4/15/2023 Magda Rader, CHENTE GP 1    23254302131 HC PT THERAPEUTIC ACT EA 15 MIN 4/15/2023 Magda Rader, CHENTE GP 2    37487082644 HC PT THER PROC EA 15 MIN 4/16/2023 Magda Rader, CHENTE GP 1    26771541535 HC PT THERAPEUTIC ACT EA 15 MIN 4/16/2023 Magda Rader, CHENTE GP 2          PT G-Codes  Outcome Measure Options: AM-PAC 6 Clicks Basic Mobility (PT)  AM-PAC 6 Clicks Score (PT): 9  AM-PAC 6 Clicks Score (OT): 16    Magda Rader PTA  4/16/2023

## 2023-04-16 NOTE — OP NOTE
Date of Procedure: 04/13/2023     SURGEON: Marco A Thompson DPM      Assistant: Nedra Mcrae MA was responsible for performing the following activities: Retraction, Suction, Irrigation and Placing Dressing and their skilled assistance was necessary for the success of this case.      PREOPERATIVE DIAGNOSIS:   1.  Left ankle Charcot  2.  Left ankle deformity  3.  Left ankle ulcer with fat layer exposed     POSTOPERATIVE DIAGNOSIS: Same as preop     PROCEDURES PERFORMED:   1.  Partial excision left fibula  2.  Left talectomy  3.  Application of ring fixator left lower extremity     ANESTHESIA: General     HEMOSTASIS: Thigh tourniquet set at 300 mmHg     ESTIMATED BLOOD LOSS: 400 mL.     SPECIMEN: Bone left fibula     MATERIALS: [place] frame with partially-threaded half pin x2, smooth wire x3, olive wire x3, zack drain     COMPLICATIONS: None.      CONDITION: Stable.        INDICATIONS FOR PROCEDURE: This is a patient of mine who has significant Charcot deformity to his left ankle.  He agrees to undergo the surgical intervention at this time. Consent is signed and documented in the chart. History and physical exam were reviewed prior to patient being taken to the operating room and n.p.o. status was confirmed. No guarantees were given or implied regarding the outcome of this treatment.      DESCRIPTION OF PROCEDURE: After mild sedation was administered by the anesthesia team in the preoperative holding area the patient was transported to the operating room and placed in a supine position.  General anesthesia was then administered and the left lower extremity was prepped and draped in usual sterile technique and a timeout was performed to confirm the correct patient, correct site, and correct procedure.      Attention was then directed to the left lower extremity which was exsanguinated using the Esmarch bandage and the tourniquet wrapped inflated to 300 mmHg.  Next a linear incision was made from  the distal lateral left lower extremity extending distally.  Sharp and blunt dissection carried down to level of fibula.  Fibula was exposed.  Utilizing a sagittal bone saw an osteotomy was performed at the midshaft of the fibula.  Fibula was removed.  Lateral malleolus will be sent to pathology for analysis.  Ankle joint was identified.  Remaining portions of the talus were excised and saved to be used as bone graft.  A second incision was made to the medial aspect of the foot and ankle.  Sharp and blunt dissection carried down to level bone.  Residual portions of bone were excised at the level of the ankle joint and saved for bone graft.  Following talectomy foot was able to be reduced back into position.  At this time articular portions remaining were denuded from the distal tibia and from the calcaneus.  Distal tibia and calcaneus were fenestrated with a 2.0 drill bit.  Bone was packed into the tibiocalcaneal arthrodesis site.  Foot and ankle were reduced and temporarily fixated with a Steinmann pin inserted from the plantar heel through the calcaneus and into the distal tibia.  Intraoperative fluoroscopy confirms adequate reduction.  Incision sites were flushed and closed in layers with Monocryl and nylon.  Prior to final closure of the lateral incision site a ANABEL drain was inserted.  Next the foot and lower leg were stabilized with a ring fixator.  Final radiographs were obtained.  Dressing applied.  Tourniquet deflated.  Patient tolerated procedure and anesthesia well and was transported from the operating room to the PACU with vital signs stable and neurovascular status intact to the operating room.    Plan to admit patient postoperatively for pain control and placement.  Patient will require definitive fixation with IM nail in the future.  He is to be nonweightbearing to the left lower extremity.            This document has been electronically signed by Marco A Thompson DPM on April 16, 2023 13:49 CDT

## 2023-04-16 NOTE — PROGRESS NOTES
"Pharmacokinetics by Pharmacy - Vancomycin    Emre Lisa is a 63 y.o. male receiving vancomycin day 2 for bone and/or joint infection  Patient is also receiving ampicillin-sulbactam    Objective:  [Ht: 193 cm (76\"); Wt: (!) 168 kg (370 lb)]     WBC   Date Value Ref Range Status   04/15/2023 5.99 3.40 - 10.80 10*3/mm3 Final   04/14/2023 7.71 3.40 - 10.80 10*3/mm3 Final   04/13/2023 8.99 3.40 - 10.80 10*3/mm3 Final      C-Reactive Protein   Date Value Ref Range Status   04/13/2023 1.26 (H) 0.00 - 0.50 mg/dL Final      Temp Readings from Last 1 Encounters:   04/16/23 98.7 °F (37.1 °C)     Estimated Creatinine Clearance: 138.9 mL/min (by C-G formula based on SCr of 0.94 mg/dL).   Creatinine   Date Value Ref Range Status   04/15/2023 0.94 0.76 - 1.27 mg/dL Final   04/14/2023 1.13 0.76 - 1.27 mg/dL Final   04/13/2023 1.34 (H) 0.76 - 1.27 mg/dL Final       Vancomycin Peak   Date Value Ref Range Status   04/15/2023 36.20 20.00 - 40.00 mcg/mL Final     Vancomycin Trough   Date Value Ref Range Status   04/15/2023 11.60 5.00 - 20.00 mcg/mL Final       Culture Results:  Microbiology Results (last 10 days)     ** No results found for the last 240 hours. **        No results found for: RESPCX    Assessment:    No labs today. Will continue to monitor trends  Patient remains afebrile  No new cultures    Dose adjusted on 4/15. Vancomycin trough and AUC goals 10-20 and 400-600, respectively.     Plan:  1. Continue vancomycin 1750mg IV Q12H. Consult ends 5/25/23.  2. Vancomycin peak on 4/17 on 0415 and vancomycin trough on 4/17 at 1245  3. Pharmacy will monitor renal function and adjust dose accordingly.      Eloy Mendoza [facility]   04/16/23 10:08 CDT  "

## 2023-04-16 NOTE — PLAN OF CARE
Problem: Adult Inpatient Plan of Care  Goal: Plan of Care Review  Outcome: Ongoing, Progressing  Flowsheets  Taken 4/16/2023 1259  Progress: improving  Plan of Care Reviewed With: patient  Taken 4/16/2023 1255  Progress: improving  Plan of Care Reviewed With: patient  Outcome Evaluation: Patient supine in bed. Supine to sit Mod A. Grooming SBA. UB bathing and dressing SBA. LB bathing and dressin SBA. (distal extremities Mod to Max A.). Toileting CGA. Pt sit to stand Mod to Max A. T/f to bschair Mod A. Pt needing increased time for tasks. Pt sitting in bschair upon exit from room. All needs in reach. Spouse present. Pt could continue to benefit from OT services.   Goal Outcome Evaluation:  Plan of Care Reviewed With: patient        Progress: improving  Outcome Evaluation: Patient supine in bed. Supine to sit Mod A. Grooming SBA. UB bathing and dressing SBA. LB bathing and dressin SBA. (distal extremities Mod to Max A.). Toileting CGA. Pt sit to stand Mod to Max A. T/f to bschair Mod A. Pt needing increased time for tasks. Pt sitting in bschair upon exit from room. All needs in reach. Spouse present. Pt could continue to benefit from OT services.

## 2023-04-17 LAB
CREAT SERPL-MCNC: 0.9 MG/DL (ref 0.76–1.27)
EGFRCR SERPLBLD CKD-EPI 2021: 97.2 ML/MIN/1.73
GLUCOSE BLDC GLUCOMTR-MCNC: 108 MG/DL (ref 70–130)
GLUCOSE BLDC GLUCOMTR-MCNC: 140 MG/DL (ref 70–130)
GLUCOSE BLDC GLUCOMTR-MCNC: 169 MG/DL (ref 70–130)
GLUCOSE BLDC GLUCOMTR-MCNC: 99 MG/DL (ref 70–130)
VANCOMYCIN PEAK SERPL-MCNC: 29.4 MCG/ML (ref 20–40)
VANCOMYCIN TROUGH SERPL-MCNC: 14.4 MCG/ML (ref 5–20)

## 2023-04-17 PROCEDURE — 97530 THERAPEUTIC ACTIVITIES: CPT

## 2023-04-17 PROCEDURE — 25010000002 AMPICILLIN-SULBACTAM PER 1.5 G: Performed by: PODIATRIST

## 2023-04-17 PROCEDURE — 25010000002 ONDANSETRON PER 1 MG: Performed by: PODIATRIST

## 2023-04-17 PROCEDURE — 80202 ASSAY OF VANCOMYCIN: CPT | Performed by: PODIATRIST

## 2023-04-17 PROCEDURE — 25010000002 VANCOMYCIN 10 G RECONSTITUTED SOLUTION: Performed by: PODIATRIST

## 2023-04-17 PROCEDURE — 25010000002 HEPARIN (PORCINE) PER 1000 UNITS: Performed by: PODIATRIST

## 2023-04-17 PROCEDURE — 82565 ASSAY OF CREATININE: CPT | Performed by: PODIATRIST

## 2023-04-17 PROCEDURE — 97110 THERAPEUTIC EXERCISES: CPT

## 2023-04-17 PROCEDURE — 99024 POSTOP FOLLOW-UP VISIT: CPT | Performed by: PODIATRIST

## 2023-04-17 PROCEDURE — 97535 SELF CARE MNGMENT TRAINING: CPT

## 2023-04-17 PROCEDURE — 82962 GLUCOSE BLOOD TEST: CPT

## 2023-04-17 RX ORDER — CALCIUM CARBONATE 200(500)MG
2 TABLET,CHEWABLE ORAL 3 TIMES DAILY PRN
Status: DISCONTINUED | OUTPATIENT
Start: 2023-04-17 | End: 2023-04-21 | Stop reason: HOSPADM

## 2023-04-17 RX ADMIN — METFORMIN HYDROCHLORIDE 1000 MG: 500 TABLET, FILM COATED ORAL at 17:38

## 2023-04-17 RX ADMIN — CETIRIZINE HYDROCHLORIDE 10 MG: 10 TABLET, FILM COATED ORAL at 09:02

## 2023-04-17 RX ADMIN — AMPICILLIN SODIUM AND SULBACTAM SODIUM 3 G: 2; 1 INJECTION, POWDER, FOR SOLUTION INTRAMUSCULAR; INTRAVENOUS at 12:46

## 2023-04-17 RX ADMIN — SODIUM CHLORIDE 100 ML/HR: 9 INJECTION, SOLUTION INTRAVENOUS at 12:46

## 2023-04-17 RX ADMIN — GLIPIZIDE 10 MG: 5 TABLET ORAL at 09:01

## 2023-04-17 RX ADMIN — Medication 400 MG: at 09:02

## 2023-04-17 RX ADMIN — LOSARTAN POTASSIUM 25 MG: 25 TABLET, FILM COATED ORAL at 09:01

## 2023-04-17 RX ADMIN — AMPICILLIN SODIUM AND SULBACTAM SODIUM 3 G: 2; 1 INJECTION, POWDER, FOR SOLUTION INTRAMUSCULAR; INTRAVENOUS at 00:16

## 2023-04-17 RX ADMIN — AMPICILLIN SODIUM AND SULBACTAM SODIUM 3 G: 2; 1 INJECTION, POWDER, FOR SOLUTION INTRAMUSCULAR; INTRAVENOUS at 18:16

## 2023-04-17 RX ADMIN — ACETAMINOPHEN 650 MG: 325 TABLET ORAL at 09:07

## 2023-04-17 RX ADMIN — VANCOMYCIN HYDROCHLORIDE 1750 MG: 10 INJECTION, POWDER, LYOPHILIZED, FOR SOLUTION INTRAVENOUS at 14:06

## 2023-04-17 RX ADMIN — HEPARIN SODIUM 5000 UNITS: 5000 INJECTION INTRAVENOUS; SUBCUTANEOUS at 21:28

## 2023-04-17 RX ADMIN — AMPICILLIN SODIUM AND SULBACTAM SODIUM 3 G: 2; 1 INJECTION, POWDER, FOR SOLUTION INTRAMUSCULAR; INTRAVENOUS at 05:10

## 2023-04-17 RX ADMIN — CHOLECALCIFEROL TAB 125 MCG (5000 UNIT) 5000 UNITS: 125 TAB at 20:19

## 2023-04-17 RX ADMIN — Medication 400 MG: at 20:19

## 2023-04-17 RX ADMIN — OXYCODONE HYDROCHLORIDE AND ACETAMINOPHEN 1000 MG: 500 TABLET ORAL at 20:18

## 2023-04-17 RX ADMIN — VANCOMYCIN HYDROCHLORIDE 1750 MG: 10 INJECTION, POWDER, LYOPHILIZED, FOR SOLUTION INTRAVENOUS at 00:47

## 2023-04-17 RX ADMIN — DOCUSATE SODIUM 50 MG AND SENNOSIDES 8.6 MG 1 TABLET: 8.6; 5 TABLET, FILM COATED ORAL at 20:27

## 2023-04-17 RX ADMIN — METOPROLOL SUCCINATE 25 MG: 25 TABLET, FILM COATED, EXTENDED RELEASE ORAL at 20:21

## 2023-04-17 RX ADMIN — ONDANSETRON 4 MG: 2 INJECTION INTRAMUSCULAR; INTRAVENOUS at 09:03

## 2023-04-17 RX ADMIN — Medication 10 ML: at 20:22

## 2023-04-17 RX ADMIN — METFORMIN HYDROCHLORIDE 1000 MG: 500 TABLET, FILM COATED ORAL at 09:01

## 2023-04-17 RX ADMIN — CALCIUM CARBONATE (ANTACID) CHEW TAB 500 MG 2 TABLET: 500 CHEW TAB at 09:03

## 2023-04-17 RX ADMIN — DRONEDARONE 400 MG: 400 TABLET, FILM COATED ORAL at 20:21

## 2023-04-17 RX ADMIN — METOPROLOL SUCCINATE 25 MG: 25 TABLET, FILM COATED, EXTENDED RELEASE ORAL at 09:02

## 2023-04-17 RX ADMIN — HEPARIN SODIUM 5000 UNITS: 5000 INJECTION INTRAVENOUS; SUBCUTANEOUS at 14:50

## 2023-04-17 RX ADMIN — HEPARIN SODIUM 5000 UNITS: 5000 INJECTION INTRAVENOUS; SUBCUTANEOUS at 05:11

## 2023-04-17 NOTE — PROGRESS NOTES
[facility]  INPATIENT WOUND & OSTOMY CARE    PROGRESS NOTE    Today's Date: 04/17/23    Patient Name: Emre Lisa  MRN: [id number]  [facility]: [id number]  PCP: Jamaal Bravo MD  Referring Provider: Marco A Thompson DPM  Attending Provider: Marco A Thompson DPM  Length of Stay: 1    SUBJECTIVE   Chief Complaint: Cellulitis    HPI: Emre Lisa is a 63 year old male I was consulted on for wound care/assessment. Patient is POD4 of left reduction left ankle deformity with application of external fixator. Patient has wound vac in place. Patient history listed below. Wound vac due to be changed today.      Visit Dx:    ICD-10-CM ICD-9-CM   1. Cellulitis of left foot  L03.116 682.7   2. Charcot's joint of left foot  M14.672 094.0     713.5   3. Type 2 diabetes mellitus with foot ulcer, unspecified whether long term insulin use  E11.621 250.80    L97.509 707.15   4. Impaired functional mobility, balance, gait, and endurance  Z74.09 V49.89   5. Impaired mobility and ADLs  Z74.09 V49.89    Z78.9        Hospital Problem List:     Cellulitis of left foot    Type 2 diabetes mellitus with skin complication    Charcot's joint of left foot      History:   Past Medical History:   Diagnosis Date   • Arthritis    • Atrial fibrillation    • Diabetes mellitus    • Diabetic foot ulcer associated with type 2 diabetes mellitus     left great toe   • Hallux malleus of left foot    • Hammer toe    • Hypertension    • MI (myocardial infarction)    • Neuropathy in diabetes    • Onychomycosis    • Presence of biventricular cardiac pacemaker    • Rheumatoid arthritis      Past Surgical History:   Procedure Laterality Date   • AMPUTATION DIGIT Right 3/5/2017    Procedure: RIGHT AMPUTATION TRANSMETATRASAL , RECESSION GASTROCNEMOUS;  Surgeon: Marco A Thompson DPM;  Location: [facility] OR;  Service:    • CARDIAC DEFIBRILLATOR PLACEMENT  2010, 2016   • CARPAL TUNNEL RELEASE  2015    elbow and wrist left arm   • FOOT  IRRIGATION, DEBRIDEMENT AND REPAIR Left 3/7/2018    Procedure: FOOT IRRIGATION, DEBRIDEMENT AND REPAIR;  Surgeon: Marco A Thompson DPM;  Location: [facility] OR;  Service:    • FOOT IRRIGATION, DEBRIDEMENT AND REPAIR Left 3/10/2018    Procedure: FOOT IRRIGATION, DEBRIDEMENT AND REPAIR;  Surgeon: Marco A Thompson DPM;  Location: [facility];  Service: Podiatry   • FOOT WOUND CLOSURE Right 3/2/2017    Procedure: INCISION AND DRAINAGE, RIGHT FOOT;  Surgeon: Tung Rosenthal DPM;  Location: [facility] OR;  Service:    • HAMMER TOE REPAIR Left 2/15/2018    Procedure: HAMMERTOE CORRECTION LEFT SECOND, THIRD AND FOURTH TOES AND HALLUX INTERPHALANGEAL JOINT ARTHRODESIS LEFT FOOT (Micro Asnis, 4.0 & 5.0 Asnis)      (c-arm);  Surgeon: Marco A Thompson DPM;  Location: [facility];  Service:    • HARDWARE REMOVAL Left 3/5/2018    Procedure: ANKLE/FOOT HARDWARE REMOVAL;  Surgeon: Marco A Thompson DPM;  Location: [facility] OR;  Service:    • INCISION AND DRAINAGE LEG Left 3/5/2018    Procedure: INCISION AND DRAINAGE LOWER EXTREMITY;  Surgeon: Marco A Thompson DPM;  Location: [facility] OR;  Service:    • PLANTAR FASCIA RELEASE Left 11/21/2019    Procedure: PLANTAR PLANING LEFT FOOT AND ALL OTHER INDICATED PROCEDURES;  Surgeon: Marco A Thompson DPM;  Location: [facility];  Service: Podiatry   • TOE AMPUTATION Right 2016   • TONSILECTOMY, ADENOIDECTOMY, BILATERAL MYRINGOTOMY AND TUBES      age 25     Social History     Socioeconomic History   • Marital status:    Tobacco Use   • Smoking status: Never   • Smokeless tobacco: Never   Vaping Use   • Vaping Use: Never used   Substance and Sexual Activity   • Alcohol use: Yes     Comment: social   • Drug use: No   • Sexual activity: Defer       Allergies:  Allergies   Allergen Reactions   • Januvia [Sitagliptin] Hives   • Lipitor [Atorvastatin] Other (See Comments)     Muscle Cramps...   • Shellfish Allergy Other (See Comments)   • Sulfa Antibiotics Rash       Medications:    Current  Facility-Administered Medications:   •  acetaminophen (TYLENOL) tablet 650 mg, 650 mg, Oral, Q6H PRN, Marco A Thompson, DPM, 650 mg at 04/17/23 0907  •  ampicillin-sulbactam 3 g/100 mL 0.9% NS IVPB, 3 g, Intravenous, Q6H, Marco A Thompson, DPM, Last Rate: 0 mL/hr at 04/16/23 1400, 3 g at 04/17/23 1246  •  ascorbic acid (VITAMIN C) tablet 1,000 mg, 1,000 mg, Oral, Nightly, Marco A Thompson, DPM, 1,000 mg at 04/16/23 2017  •  sennosides-docusate (PERICOLACE) 8.6-50 MG per tablet 2 tablet, 2 tablet, Oral, BID, 2 tablet at 04/16/23 0924 **AND** polyethylene glycol (MIRALAX) packet 17 g, 17 g, Oral, Daily PRN **AND** bisacodyl (DULCOLAX) EC tablet 5 mg, 5 mg, Oral, Daily PRN **AND** bisacodyl (DULCOLAX) suppository 10 mg, 10 mg, Rectal, Daily PRN, Marco A Thompson, DPM  •  calcium carbonate (TUMS) chewable tablet 500 mg (200 mg elemental), 2 tablet, Oral, TID PRN, Adis Castellon MD, 2 tablet at 04/17/23 0903  •  cetirizine (zyrTEC) tablet 10 mg, 10 mg, Oral, Daily, Marco A Thompson, DPM, 10 mg at 04/17/23 0902  •  dextrose (D50W) (25 g/50 mL) IV injection 25 g, 25 g, Intravenous, Q15 Min PRN, Regis Lorenzo MD  •  dextrose (GLUTOSE) oral gel 15 g, 15 g, Oral, Q15 Min PRN, Regis Lorenzo MD  •  dronedarone (MULTAQ) tablet 400 mg, 400 mg, Oral, Nightly, Marco A Thompson, DPM, 400 mg at 04/16/23 2017  •  fluticasone (FLONASE) 50 MCG/ACT nasal spray 2 spray, 2 spray, Nasal, Daily PRN, Marco A Thompson DPM  •  glipizide (GLUCOTROL) tablet 10 mg, 10 mg, Oral, QAM AC, Marco A Thompson, DPTORI, 10 mg at 04/17/23 0901  •  glucagon (human recombinant) (GLUCAGEN DIAGNOSTIC) injection 1 mg, 1 mg, Intramuscular, Q15 Min PRN, Regis Lorenzo MD  •  heparin (porcine) 5000 UNIT/ML injection 5,000 Units, 5,000 Units, Subcutaneous, Q8H, Marco A Thompson DPM, 5,000 Units at 04/17/23 0511  •  HYDROmorphone (DILAUDID) injection 0.5 mg, 0.5 mg, Intravenous, Q2H PRN **AND** naloxone (NARCAN) injection 0.4 mg, 0.4 mg, Intravenous, Q5 Min PRN,  Marco A Thompson DPM  •  losartan (COZAAR) tablet 25 mg, 25 mg, Oral, Daily, Marco A Thompson, DPM, 25 mg at 04/17/23 0901  •  magnesium oxide (MAG-OX) tablet 400 mg, 400 mg, Oral, BID, Marco A Thompson, DPM, 400 mg at 04/17/23 0902  •  metFORMIN (GLUCOPHAGE) tablet 1,000 mg, 1,000 mg, Oral, BID With Meals, Marco A Thompson, DPM, 1,000 mg at 04/17/23 0901  •  metoprolol succinate XL (TOPROL-XL) 24 hr tablet 25 mg, 25 mg, Oral, BID, Marco A Thompson, DPM, 25 mg at 04/17/23 0902  •  ondansetron (ZOFRAN) injection 4 mg, 4 mg, Intravenous, Q6H PRN, Marco A Thompson, DPM, 4 mg at 04/17/23 0903  •  oxyCODONE-acetaminophen (PERCOCET) 7.5-325 MG per tablet 1 tablet, 1 tablet, Oral, Q4H PRN, Marco A Thompson, DPM  •  Pharmacy to dose vancomycin, , Does not apply, Continuous PRN, Marco A Thompson, DPM  •  sodium chloride 0.9 % flush 10 mL, 10 mL, Intravenous, Q12H, Marco A Thompson, DPM, 10 mL at 04/16/23 0925  •  sodium chloride 0.9 % flush 10 mL, 10 mL, Intravenous, PRN, Marco A Thompson, DPM  •  sodium chloride 0.9 % infusion 40 mL, 40 mL, Intravenous, PRN, Marco A Thompson, DPM  •  sodium chloride 0.9 % infusion, 100 mL/hr, Intravenous, Continuous, Marco A Thompson, DPM, Last Rate: 100 mL/hr at 04/17/23 1246, 100 mL/hr at 04/17/23 1246  •  vancomycin 1750 mg/500 mL 0.9% NS IVPB ([facility]), 1,750 mg, Intravenous, Q12H, Marco A Thompson, DPM, Last Rate: 0 mL/hr at 04/16/23 1607, 1,750 mg at 04/17/23 1406  •  vitamin D3 tablet 5,000 Units, 5,000 Units, Oral, Nightly, Marco A Thompson L, DPM, 5,000 Units at 04/16/23 2018    \    OBJECTIVE     Vitals:    04/17/23 1054   BP: 110/68   Pulse: 90   Resp: 18   Temp: 98 °F (36.7 °C)   SpO2: 94%       PHYSICAL EXAM  Physical Exam  Vitals reviewed. Nursing notes reviewed.  Constitutional:       Appearance: Well-developed.     Skin:     General: Skin is warm and dry.      Coloration: Skin is not pale.      Findings: No erythema or rash. Dressing C/D/I, woundvac in place suctioning at 125  mmHg.  Neurological:      Mental Status: Pt is alert and oriented to person, place, and time.   Psychiatric:         Behavior: Behavior normal.         Thought Content: Thought content normal.         Judgment: Judgment normal.       Results Review:  Lab Results (last 48 hours)     Procedure Component Value Units Date/Time    Vancomycin, Trough [[id number]]  (Normal) Collected: 04/17/23 1245    Specimen: Blood Updated: 04/17/23 1318     Vancomycin Trough 14.40 mcg/mL     POC Glucose Once [[id number]]  (Abnormal) Collected: 04/17/23 1056    Specimen: Blood Updated: 04/17/23 1110     Glucose 140 mg/dL      Comment: RN NotifiedOperator: [id number] SALVATORE NOBLESANNAMeter ID: [id number]       POC Glucose Once [[id number]]  (Abnormal) Collected: 04/17/23 0705    Specimen: Blood Updated: 04/17/23 0728     Glucose 169 mg/dL      Comment: RN NotifiedOperator: [id number] SALVATORE NOBLESANNAMeter ID: [id number]       Creatinine Serum (kidney function) GFR component [[id number]]  (Normal) Collected: 04/17/23 0422    Specimen: Blood Updated: 04/17/23 0448     Creatinine 0.90 mg/dL      eGFR 97.2 mL/min/1.73     Narrative:      GFR Normal >60  Chronic Kidney Disease <60  Kidney Failure <15      Vancomycin, Peak [[id number]]  (Normal) Collected: 04/17/23 0422    Specimen: Blood Updated: 04/17/23 0447     Vancomycin Peak 29.40 mcg/mL     POC Glucose Once [[id number]]  (Normal) Collected: 04/16/23 1908    Specimen: Blood Updated: 04/16/23 1951     Glucose 109 mg/dL      Comment: RN NotifiedOperator: [id number] ELLEN SARAHMeter ID: [id number]       POC Glucose Once [[id number]]  (Normal) Collected: 04/16/23 1612    Specimen: Blood Updated: 04/16/23 1639     Glucose 125 mg/dL      Comment: RN NotifiedOperator: [id number] CAMPLIN HEATHERMeter ID: [id number]       POC Glucose Once [[id number]]  (Abnormal) Collected: 04/16/23 1134    Specimen: Blood Updated: 04/16/23 1152     Glucose 156 mg/dL      Comment: RN NotifiedOperator:  [id number] [place] HEATH[place] ID: [id number]       POC Glucose Once [[id number]]  (Abnormal) Collected: 04/16/23 0726    Specimen: Blood Updated: 04/16/23 0805     Glucose 153 mg/dL      Comment: RN NotifiedOperator: [id number] [place] ID: [id number]       POC Glucose Once [[id number]]  (Normal) Collected: 04/15/23 1913    Specimen: Blood Updated: 04/15/23 2019     Glucose 121 mg/dL      Comment: RN NotifiedOperator: [id number] ELLEN SARAHMeter ID: [id number]       POC Glucose Once [[id number]]  (Normal) Collected: 04/15/23 1614    Specimen: Blood Updated: 04/15/23 1648     Glucose 111 mg/dL      Comment: RN NotifiedOperator: [id number] NAZARIO ALISEMeter ID: [id number]           Imaging Results (Last 72 Hours)     ** No results found for the last 72 hours. **             ASSESSMENT/PLAN       Examination and evaluation of wound(s) was performed.    DIAGNOSIS:     Cellulitis of LLE with woundvac    PLAN:     Woundvac changed. Patient tolerated well. Vac suctioning at 125 mmHg. Will change Wednesday. Call with questions.    Discussed findings and treatment plan including risks, benefits, and treatment options with patient in detail. Patient agreed with treatment plan.        This document has been electronically signed by WALTER Duncan on April 17, 2023 14:32 CDT

## 2023-04-17 NOTE — PROGRESS NOTES
"Pharmacokinetics by Pharmacy - Vancomycin    Emre Lisa is a 63 y.o. male receiving vancomycin 1750 mg IV q12hr (day 5) for bone and/or joint infection.  Patient is also receiving ampicillin/sulbactam.    Objective:    Temp Readings from Last 1 Encounters:   04/17/23 98.6 °F (37 °C) (Temporal)     WBC   Date Value Ref Range Status   04/15/2023 5.99 3.40 - 10.80 10*3/mm3 Final    No results found for: CRP, LACTATE   Creatinine   Date Value Ref Range Status   04/17/2023 0.90 0.76 - 1.27 mg/dL Final   04/15/2023 0.94 0.76 - 1.27 mg/dL Final       Vancomycin Peak   Date Value Ref Range Status   04/17/2023 29.40 20.00 - 40.00 mcg/mL Final   04/15/2023 36.20 20.00 - 40.00 mcg/mL Final     Vancomycin Trough   Date Value Ref Range Status   04/15/2023 11.60 5.00 - 20.00 mcg/mL Final       Culture Results:  Microbiology Results (last 10 days)     ** No results found for the last 240 hours. **        No results found for: RESPCX    [Ht: 182.9 cm (72\"); Wt: (!) 168 kg (370 lb)]   Body mass index is 50.18 kg/m².  Ideal body weight: 77.6 kg (171 lb 1.2 oz)  Adjusted ideal body weight: 114 kg (250 lb 10.3 oz)      Assessment:  • WBCs WNL on 4/15, afebrile  • Creatinine 0.9 mcg/mL and appears stable.   • No cultures  • Vancomycin peak and trough resulted at 29.4 mcg/mL and 14.4 mcg/mL, respectively. These give a calculated AUC and trough of 546 and 14.2 mcg/mL, respectively. These are within goal range.   • AUC and trough goals are 400-600 and 10-20 mcg/mL, respectively.     Plan:  1. Continue Vancomycin 1750 mg IV q12hrs   2. Vancomycin levels when clinically indicated. Consulted until 5/25.  3. Pharmacy will monitor renal function and adjust dose accordingly.    Thank you for this consult.     Estiven Cannon, [facility]   04/17/23 10:45 CDT    "

## 2023-04-17 NOTE — PLAN OF CARE
Problem: Adult Inpatient Plan of Care  Goal: Absence of Hospital-Acquired Illness or Injury  Outcome: Ongoing, Progressing  Intervention: Identify and Manage Fall Risk  Recent Flowsheet Documentation  Taken 4/17/2023 0810 by Hortensia Zuleta RN  Safety Promotion/Fall Prevention: safety round/check completed  Intervention: Prevent and Manage VTE (Venous Thromboembolism) Risk  Recent Flowsheet Documentation  Taken 4/17/2023 0810 by Hortensia Zuleta RN  Activity Management: (pt/ot offered pt deferred until later) activity encouraged  Range of Motion: (pt refused OT/PT and deferred it until later) active ROM (range of motion) encouraged  Goal: Optimal Comfort and Wellbeing  Outcome: Ongoing, Progressing  Intervention: Monitor Pain and Promote Comfort  Recent Flowsheet Documentation  Taken 4/17/2023 0907 by Hortensia Zuleta RN  Pain Management Interventions: see MAR  Intervention: Provide Person-Centered Care  Recent Flowsheet Documentation  Taken 4/17/2023 0810 by Hortensia Zuleta RN  Trust Relationship/Rapport:   care explained   choices provided   emotional support provided   questions answered   questions encouraged   empathic listening provided   thoughts/feelings acknowledged  Goal: Readiness for Transition of Care  Outcome: Ongoing, Progressing     Problem: Fall Injury Risk  Goal: Absence of Fall and Fall-Related Injury  Outcome: Ongoing, Progressing  Intervention: Promote Injury-Free Environment  Recent Flowsheet Documentation  Taken 4/17/2023 0810 by Hortensia Zuleta RN  Safety Promotion/Fall Prevention: safety round/check completed     Problem: Pain Acute  Goal: Acceptable Pain Control and Functional Ability  Outcome: Ongoing, Progressing  Intervention: Prevent or Manage Pain  Recent Flowsheet Documentation  Taken 4/17/2023 0810 by Hortensia Zuleta RN  Bowel Elimination Promotion: adequate fluid intake promoted  Intervention: Develop Pain Management Plan  Recent Flowsheet Documentation  Taken 4/17/2023 0907 by Song  Hortensia, RN  Pain Management Interventions: see MAR  Intervention: Optimize Psychosocial Wellbeing  Recent Flowsheet Documentation  Taken 4/17/2023 0810 by Hortensia Zuleta, RN  Supportive Measures:   active listening utilized   decision-making supported   goal-setting facilitated   relaxation techniques promoted   self-care encouraged   verbalization of feelings encouraged  Diversional Activities: television     Problem: Skin Injury Risk Increased  Goal: Skin Health and Integrity  Outcome: Ongoing, Progressing   Goal Outcome Evaluation:   Pt AO X 4 call light within reach requests Tylenol for pain management vss

## 2023-04-17 NOTE — PLAN OF CARE
Goal Outcome Evaluation:  Plan of Care Reviewed With: patient, spouse        Progress: improving  Outcome Evaluation: PT tx: Patient seated in chair upon arrival. Agreeable to therapy. PT cued patient in seated ther ex x30 reps each for R marches, L seated SLR, B LAQ and R ankle DF. Increased time needed for muscle fatigue and rest breaks between sets as needed. Patient cued in full isolation of musculature where able. Patient fatigued quickly in the L quads. Today patient was modA for STS x2 w/increased time to get to standing and needing a few seconds to stabilize once in standing. CGA once in standing with fwd posture and maintaining WB precautions well. Patient cued in hopping in place to improve transfers and prepare for ambulation. Patient continues to require further rehab. Continue POC.

## 2023-04-17 NOTE — THERAPY TREATMENT NOTE
Patient Name: Emre Lisa  : 1960    MRN: [id number]                              Today's Date: 2023       Admit Date: 2023    Visit Dx:     ICD-10-CM ICD-9-CM   1. Cellulitis of left foot  L03.116 682.7   2. Charcot's joint of left foot  M14.672 094.0     713.5   3. Type 2 diabetes mellitus with foot ulcer, unspecified whether long term insulin use  E11.621 250.80    L97.509 707.15   4. Impaired functional mobility, balance, gait, and endurance  Z74.09 V49.89   5. Impaired mobility and ADLs  Z74.09 V49.89    Z78.9      Patient Active Problem List   Diagnosis   • Foot osteomyelitis, right   • Nodule of groin   • Type 2 diabetes mellitus with skin complication   • Status post transmetatarsal amputation of right foot   • Hammer toe of left foot   • Hallux malleus of left foot   • Cellulitis of left foot   • Infected hardware in left leg   • Chronic multifocal osteomyelitis of left foot   • Charcot's joint of left foot   • Skin ulcer of left foot, limited to breakdown of skin     Past Medical History:   Diagnosis Date   • Arthritis    • Atrial fibrillation    • Diabetes mellitus    • Diabetic foot ulcer associated with type 2 diabetes mellitus     left great toe   • Hallux malleus of left foot    • Hammer toe    • Hypertension    • MI (myocardial infarction)    • Neuropathy in diabetes    • Onychomycosis    • Presence of biventricular cardiac pacemaker    • Rheumatoid arthritis      Past Surgical History:   Procedure Laterality Date   • AMPUTATION DIGIT Right 3/5/2017    Procedure: RIGHT AMPUTATION TRANSMETATRASAL , RECESSION GASTROCNEMOUS;  Surgeon: Marco A Thompson DPM;  Location: [facility];  Service:    • CARDIAC DEFIBRILLATOR PLACEMENT  ,    • CARPAL TUNNEL RELEASE  2015    elbow and wrist left arm   • FOOT IRRIGATION, DEBRIDEMENT AND REPAIR Left 3/7/2018    Procedure: FOOT IRRIGATION, DEBRIDEMENT AND REPAIR;  Surgeon: Marco A Thompson DPM;  Location: [facility];  Service:    • FOOT  IRRIGATION, DEBRIDEMENT AND REPAIR Left 3/10/2018    Procedure: FOOT IRRIGATION, DEBRIDEMENT AND REPAIR;  Surgeon: Marco A Thompson DPM;  Location: [facility] OR;  Service: Podiatry   • FOOT WOUND CLOSURE Right 3/2/2017    Procedure: INCISION AND DRAINAGE, RIGHT FOOT;  Surgeon: Tung Rosenthal DPM;  Location: [facility] OR;  Service:    • HAMMER TOE REPAIR Left 2/15/2018    Procedure: HAMMERTOE CORRECTION LEFT SECOND, THIRD AND FOURTH TOES AND HALLUX INTERPHALANGEAL JOINT ARTHRODESIS LEFT FOOT (Micro Asnis, 4.0 & 5.0 Asnis)      (c-arm);  Surgeon: Marco A Thompson DPM;  Location: [facility] OR;  Service:    • HARDWARE REMOVAL Left 3/5/2018    Procedure: ANKLE/FOOT HARDWARE REMOVAL;  Surgeon: Marco A Thompson DPM;  Location: [facility] OR;  Service:    • INCISION AND DRAINAGE LEG Left 3/5/2018    Procedure: INCISION AND DRAINAGE LOWER EXTREMITY;  Surgeon: Marco A Thompson DPM;  Location: [facility] OR;  Service:    • PLANTAR FASCIA RELEASE Left 11/21/2019    Procedure: PLANTAR PLANING LEFT FOOT AND ALL OTHER INDICATED PROCEDURES;  Surgeon: Marco A Thompson DPM;  Location: [facility];  Service: Podiatry   • TOE AMPUTATION Right 2016   • TONSILECTOMY, ADENOIDECTOMY, BILATERAL MYRINGOTOMY AND TUBES      age 25      General Information     Row Name 04/17/23 1210          Physical Therapy Time and Intention    Document Type therapy note (daily note)  -     Mode of Treatment individual therapy;physical therapy  -     Row Name 04/17/23 1210          General Information    Patient Profile Reviewed yes  -     Existing Precautions/Restrictions fall;non-weight bearing;left  -     Row Name 04/17/23 1210          Safety Issues, Functional Mobility    Safety Issues Affecting Function (Mobility) sequencing abilities  -     Impairments Affecting Function (Mobility) strength  -           User Key  (r) = Recorded By, (t) = Taken By, (c) = Cosigned By    Initials Name Provider Type     Yanet Vega PT Physical Therapist                Mobility     Row Name 04/17/23 1210          Sit-Stand Transfer    Sit-Stand [place] (Transfers) moderate assist (50% patient effort)  -     Assistive Device (Sit-Stand Transfers) walker, front-wheeled  -     Comment, (Sit-Stand Transfer) STS x 2; hopping in place x 5  -     Row Name 04/17/23 1210          Mobility    Extremity Weight-bearing Status left lower extremity   -     Left Lower Extremity (Weight-bearing Status) non weight-bearing (NWB)   -           User Key  (r) = Recorded By, (t) = Taken By, (c) = Cosigned By    Initials Name Provider Type     Yanet Vega PT Physical Therapist               Obj/Interventions     Row Name 04/17/23 1210          Motor Skills    Therapeutic Exercise hip;ankle;knee  -[facility] Name 04/17/23 1210          Hip (Therapeutic Exercise)    Hip (Therapeutic Exercise) strengthening exercise  -     Hip AROM (Therapeutic Exercise) bilateral;flexion;30 repititions  -     Row Name 04/17/23 1210          Knee (Therapeutic Exercise)    Knee (Therapeutic Exercise) strengthening exercise  -     Knee AROM (Therapeutic Exercise) bilateral;LAQ (long arc quad);30 repititions  -     Row Name 04/17/23 1210          Ankle (Therapeutic Exercise)    Ankle (Therapeutic Exercise) strengthening exercise  -     Ankle AROM (Therapeutic Exercise) right;dorsiflexion;30 repititions  -     Row Name 04/17/23 1210          Balance    Balance Assessment sitting static balance;standing static balance  -     Static Sitting Balance independent  -     Position, Sitting Balance sitting in chair  -     Static Standing Balance contact guard  -     Position/Device Used, Standing Balance walker, front-wheeled  -HM           User Key  (r) = Recorded By, (t) = Taken By, (c) = Cosigned By    Initials Name Provider Type     Yanet Vega PT Physical Therapist               Goals/Plan     Row Name 04/17/23 1210          Bed Mobility Goal 1 (PT)    Activity/Assistive Device  (Bed Mobility Goal 1, PT) bed mobility activities, all  -HM     [place] Level/Cues Needed (Bed Mobility Goal 1, PT) moderate assist (50-74% patient effort)  -HM     Time Frame (Bed Mobility Goal 1, PT) by discharge  -HM     Strategies/Barriers (Bed Mobility Goal 1, PT) while maintaining WB precautions  -HM     Progress/Outcomes (Bed Mobility Goal 1, PT) new goal;goal not met  -     Row Name 04/17/23 1210          Transfer Goal 1 (PT)    Activity/Assistive Device (Transfer Goal 1, PT) sit-to-stand/stand-to-sit;bed-to-chair/chair-to-bed  -HM     [place] Level/Cues Needed (Transfer Goal 1, PT) moderate assist (50-74% patient effort)  -HM     Time Frame (Transfer Goal 1, PT) by discharge  -HM     Strategies/Barriers (Transfers Goal 1, PT) While maintaining WB precautions  -HM     Progress/Outcome (Transfer Goal 1, PT) goal not met;good progress toward goal  -HM     Row Name 04/17/23 1210          Problem Specific Goal 1 (PT)    Problem Specific Goal 1 (PT) Improve functional LE strength on the R to be able to complete x5 STS w/modA to maxA  -HM     Time Frame (Problem Specific Goal 1, PT) by discharge  -HM     Progress/Outcome (Problem Specific Goal 1, PT) good progress toward goal;goal not met  -           User Key  (r) = Recorded By, (t) = Taken By, (c) = Cosigned By    Initials Name Provider Type     Yanet Vega, PT Physical Therapist               Clinical Impression     Row Name 04/17/23 1210          Pain    Pretreatment Pain Rating 1/10  -     Posttreatment Pain Rating 1/10  -     Pain Location - Side/Orientation Left  -     Pain Location - hip;shoulder  -     Pain Intervention(s) Repositioned;Ambulation/increased activity  -     Row Name 04/17/23 1210          Plan of Care Review    Progress improving  -     Outcome Evaluation PT tx: Patient seated in chair upon arrival. Agreeable to therapy. PT cued patient in seated ther ex x30 reps each for R julianne, L seated SLR, B LAQ and  R ankle DF. Increased time needed for muscle fatigue and rest breaks between sets as needed. Patient cued in full isolation of musculature where able. Patient fatigued quickly in the L quads. Today patient was modA for STS x2 w/increased time to get to standing and needing a few seconds to stabilize once in standing. CGA once in standing with fwd posture and maintaining WB precautions well. Patient cued in hopping in place to improve transfers and prepare for ambulation. Patient continues to require further rehab. Continue POC.  -     Row Name 04/17/23 1210          Vital Signs    Pre Systolic BP Rehab 130  -HM     Pre Treatment Diastolic BP 77  -HM     Pretreatment Heart Rate (beats/min) 90  -HM     Pre Patient Position Sitting  -     Intra Patient Position Standing  -     Post Patient Position Sitting  -     Row Name 04/17/23 1210          Positioning and Restraints    Pre-Treatment Position sitting in chair/recliner  -     Post Treatment Position chair  -     In Chair sitting;call light within reach;encouraged to call for assist;exit alarm on;with other staff  -           User Key  (r) = Recorded By, (t) = Taken By, (c) = Cosigned By    Initials Name Provider Type     Yanet Vega, PT Physical Therapist               Outcome Measures     Row Name 04/17/23 1210 04/17/23 0810       How much help from another person do you currently need...    Turning from your back to your side while in flat bed without using bedrails? 2  - 2  -TH    Moving from lying on back to sitting on the side of a flat bed without bedrails? 2  - 2  -TH    Moving to and from a bed to a chair (including a wheelchair)? 2  - 2  -TH    Standing up from a chair using your arms (e.g., wheelchair, bedside chair)? 2  - 2  -TH    Climbing 3-5 steps with a railing? 1  - 1  -TH    To walk in hospital room? 1  - 1  -TH    AM-PAC 6 Clicks Score (PT) 10  - 10  -TH    Highest level of mobility 4 --> Transferred to  chair/commode  - 4 --> Transferred to chair/commode  -    Row Name 04/17/23 1210          Functional Assessment    Outcome Measure Options AM-PAC 6 Clicks Basic Mobility (PT)  -           User Key  (r) = Recorded By, (t) = Taken By, (c) = Cosigned By    Initials Name Provider Type     Yanet Vega, NESTOR Physical Therapist    Hortensia Rogel, RN Registered Nurse                             Physical Therapy Education     Title: PT OT SLP Therapies (In Progress)     Topic: Physical Therapy (In Progress)     Point: Mobility training (Done)     Learning Progress Summary           Patient Acceptance, E, VU by  at 4/14/2023 1242    Comment: POC and goals   Significant Other Acceptance, E, VU by  at 4/14/2023 1242    Comment: POC and goals                   Point: Home exercise program (Not Started)     Learner Progress:  Not documented in this visit.          Point: Body mechanics (Not Started)     Learner Progress:  Not documented in this visit.          Point: Precautions (Done)     Learning Progress Summary           Patient Acceptance, E, VU by  at 4/14/2023 1242    Comment: POC and goals   Significant Other Acceptance, E, VU by  at 4/14/2023 1242    Comment: POC and goals                               User Key     Initials Effective Dates Name Provider Type [facility] 01/09/23 -  Yanet Vega, NESTOR Physical Therapist PT              PT Recommendation and Plan  Planned Therapy Interventions (PT): balance training, bed mobility training, gait training, neuromuscular re-education, patient/family education, ROM (range of motion), strengthening, stretching, transfer training, stair training  Plan of Care Reviewed With: patient, spouse  Progress: improving  Outcome Evaluation: PT tx: Patient seated in chair upon arrival. Agreeable to therapy. PT cued patient in seated ther ex x30 reps each for R marches, L seated SLR, B LAQ and R ankle DF. Increased time needed for muscle fatigue and rest breaks  between sets as needed. Patient cued in full isolation of musculature where able. Patient fatigued quickly in the L quads. Today patient was modA for STS x2 w/increased time to get to standing and needing a few seconds to stabilize once in standing. CGA once in standing with fwd posture and maintaining WB precautions well. Patient cued in hopping in place to improve transfers and prepare for ambulation. Patient continues to require further rehab. Continue POC.     Time Calculation:    PT Charges     Row Name 04/17/23 1210             Time Calculation    Start Time 1210  -HM      Stop Time 1249  -HM      Time Calculation (min) 39 min  -HM      PT Received On 04/17/23  -         Time Calculation- PT    Total Timed Code Minutes- PT 39 minute(s)  -HM         Timed Charges    74516 - PT Therapeutic Exercise Minutes 23  -HM      70655 - PT Therapeutic Activity Minutes 16  -HM         Total Minutes    Timed Charges Total Minutes 39  -HM       Total Minutes 39  -HM            User Key  (r) = Recorded By, (t) = Taken By, (c) = Cosigned By    Initials Name Provider Type     Yanet Vega, PT Physical Therapist              Therapy Charges for Today     Code Description Service Date Service Provider Modifiers Qty    22682519449 HC PT THER PROC EA 15 MIN 4/17/2023 Yanet Vega, PT GP 2    64555992495 HC PT THERAPEUTIC ACT EA 15 MIN 4/17/2023 Yanet Vega, PT GP 1          PT G-Codes  Outcome Measure Options: AM-PAC 6 Clicks Basic Mobility (PT)  AM-PAC 6 Clicks Score (PT): 10  AM-PAC 6 Clicks Score (OT): 17  PT Discharge Summary  Anticipated Discharge Disposition (PT): [facility] ([facility] care hospital)    Yanet Vega PT  4/17/2023

## 2023-04-17 NOTE — PROGRESS NOTES
Podiatric Surgery Progress Note    Subjective     Post-Operative Day: 4 post-left reduction left ankle deformity with application of external fixator  Systemic or Specific Complaints: No Complaints    Objective     Vital signs in last 24 hours:  Temp:  [97.8 °F (36.6 °C)-99.6 °F (37.6 °C)] 98 °F (36.7 °C)  Heart Rate:  [90-98] 90  Resp:  [18-20] 18  BP: (110-140)/(61-73) 110/68    General: alert, appears stated age and cooperative   Neurovascular:  Sensation left lower extremity absent  Capillary refill: Normal   Wound:  dressing c/d/i. Drain intact with ~25 mL output   Range of Motion:  Absent secondary to frame application   DVT Exam: No evidence of DVT seen on physical exam.     Data Review  CBC:  Results from last 7 days   Lab Units 04/15/23  0611   WBC 10*3/mm3 5.99   RBC 10*6/mm3 3.38*   HEMOGLOBIN g/dL 10.2*   HEMATOCRIT % 31.3*   PLATELETS 10*3/mm3 115*       Assessment & Plan     Charcot left ankle    Patient doing well postoperatively. Dressing changed today. Continue heparin for DVT prophylaxis.  Continue IV antibiotics.  LTAC referral pending.  Nonweightbearing left lower extremity. Call with questions.     LOS: 1 day     Marco A Thompson DPM    Date: 4/17/2023  Time: 13:01 CDT

## 2023-04-17 NOTE — THERAPY TREATMENT NOTE
Patient Name: Emre Lisa  : 1960    MRN: [id number]                              Today's Date: 2023       Admit Date: 2023    Visit Dx:     ICD-10-CM ICD-9-CM   1. Cellulitis of left foot  L03.116 682.7   2. Charcot's joint of left foot  M14.672 094.0     713.5   3. Type 2 diabetes mellitus with foot ulcer, unspecified whether long term insulin use  E11.621 250.80    L97.509 707.15   4. Impaired functional mobility, balance, gait, and endurance  Z74.09 V49.89   5. Impaired mobility and ADLs  Z74.09 V49.89    Z78.9      Patient Active Problem List   Diagnosis   • Foot osteomyelitis, right   • Nodule of groin   • Type 2 diabetes mellitus with skin complication   • Status post transmetatarsal amputation of right foot   • Hammer toe of left foot   • Hallux malleus of left foot   • Cellulitis of left foot   • Infected hardware in left leg   • Chronic multifocal osteomyelitis of left foot   • Charcot's joint of left foot   • Skin ulcer of left foot, limited to breakdown of skin     Past Medical History:   Diagnosis Date   • Arthritis    • Atrial fibrillation    • Diabetes mellitus    • Diabetic foot ulcer associated with type 2 diabetes mellitus     left great toe   • Hallux malleus of left foot    • Hammer toe    • Hypertension    • MI (myocardial infarction)    • Neuropathy in diabetes    • Onychomycosis    • Presence of biventricular cardiac pacemaker    • Rheumatoid arthritis      Past Surgical History:   Procedure Laterality Date   • AMPUTATION DIGIT Right 3/5/2017    Procedure: RIGHT AMPUTATION TRANSMETATRASAL , RECESSION GASTROCNEMOUS;  Surgeon: Marco A Thompson DPM;  Location: [facility];  Service:    • CARDIAC DEFIBRILLATOR PLACEMENT  ,    • CARPAL TUNNEL RELEASE  2015    elbow and wrist left arm   • FOOT IRRIGATION, DEBRIDEMENT AND REPAIR Left 3/7/2018    Procedure: FOOT IRRIGATION, DEBRIDEMENT AND REPAIR;  Surgeon: Marco A Thompson DPM;  Location: [facility];  Service:    • FOOT  IRRIGATION, DEBRIDEMENT AND REPAIR Left 3/10/2018    Procedure: FOOT IRRIGATION, DEBRIDEMENT AND REPAIR;  Surgeon: Marco A Thompson DPM;  Location: [facility] OR;  Service: Podiatry   • FOOT WOUND CLOSURE Right 3/2/2017    Procedure: INCISION AND DRAINAGE, RIGHT FOOT;  Surgeon: Tung Rosenthal DPM;  Location: [facility] OR;  Service:    • HAMMER TOE REPAIR Left 2/15/2018    Procedure: HAMMERTOE CORRECTION LEFT SECOND, THIRD AND FOURTH TOES AND HALLUX INTERPHALANGEAL JOINT ARTHRODESIS LEFT FOOT (Micro Asnis, 4.0 & 5.0 Asnis)      (c-arm);  Surgeon: Marco A Thompson DPM;  Location: [facility] OR;  Service:    • HARDWARE REMOVAL Left 3/5/2018    Procedure: ANKLE/FOOT HARDWARE REMOVAL;  Surgeon: Marco A Thompson DPM;  Location: [facility] OR;  Service:    • INCISION AND DRAINAGE LEG Left 3/5/2018    Procedure: INCISION AND DRAINAGE LOWER EXTREMITY;  Surgeon: Marco A Thompson DPM;  Location: [facility] OR;  Service:    • PLANTAR FASCIA RELEASE Left 11/21/2019    Procedure: PLANTAR PLANING LEFT FOOT AND ALL OTHER INDICATED PROCEDURES;  Surgeon: Marco A Thompson DPM;  Location: [facility] OR;  Service: Podiatry   • TOE AMPUTATION Right 2016   • TONSILECTOMY, ADENOIDECTOMY, BILATERAL MYRINGOTOMY AND TUBES      age 25      General Information     Row Name 04/17/23 1027          OT Time and Intention    Document Type therapy note (daily note)  -CS     Mode of Treatment individual therapy;occupational therapy  -     Row Name 04/17/23 1027          General Information    Patient Profile Reviewed yes  -CS     Existing Precautions/Restrictions fall;non-weight bearing;left  -CS     Row Name 04/17/23 1027          Cognition    Orientation Status (Cognition) oriented x 4  -CS     Row Name 04/17/23 1027          Safety Issues, Functional Mobility    Impairments Affecting Function (Mobility) pain;strength  -CS           User Key  (r) = Recorded By, (t) = Taken By, (c) = Cosigned By    Initials Name Provider Type    CS Rosario Guzman COTA Occupational  Therapist Assistant                 Mobility/ADL's     [facility] Name 04/17/23 1027          Activities of Daily Living    BADL Assessment/Intervention feeding  -[facility] Name 04/17/23 1027          Mobility    Extremity Weight-bearing Status left lower extremity  -     Left Lower Extremity (Weight-bearing Status) non weight-bearing (NWB)  -[facility] Name 04/17/23 1027          Grooming Assessment/Training    [place] Level (Grooming) grooming skills;set up  -CS     Position (Grooming) supported sitting  -CS     Row Name 04/17/23 102          Self-Feeding Assessment/Training    [place] Level (Feeding) feeding skills;prepare tray/open items;independent  -CS     Position (Self-Feeding) supported sitting  -           User Key  (r) = Recorded By, (t) = Taken By, (c) = Cosigned By    Initials Name Provider Type    CS Rosario Guzman COTA Occupational Therapist Assistant               Obj/Interventions     [facility] Name 04/17/23 1027          Shoulder (Therapeutic Exercise)    Shoulder (Therapeutic Exercise) AROM (active range of motion)  -CS     Row Name 04/17/23 1027          Elbow/Forearm (Therapeutic Exercise)    Elbow/Forearm (Therapeutic Exercise) AROM (active range of motion)  -     Elbow/Forearm AROM (Therapeutic Exercise) bilateral;flexion;extension;supination;pronation  -CS     Row Name 04/17/23 1027          Wrist (Therapeutic Exercise)    Wrist (Therapeutic Exercise) AROM (active range of motion)  -     Wrist AROM (Therapeutic Exercise) bilateral;flexion;extension  -CS     Row Name 04/17/23 1027          Hand (Therapeutic Exercise)    Hand (Therapeutic Exercise) AROM (active range of motion)  -     Hand AROM/AAROM (Therapeutic Exercise) bilateral;finger flexion;finger extension  -CS     Row Name 04/17/23 102          Motor Skills    Therapeutic Exercise shoulder;elbow/forearm;wrist;hand  -           User Key  (r) = Recorded By, (t) = Taken By, (c) = Cosigned By    Initials Name Provider Type     CS Rosario Guzman COTA Occupational Therapist Assistant               Goals/Plan     Row Name 04/17/23 1027          Transfer Goal 1 (OT)    Activity/Assistive Device (Transfer Goal 1, OT) sit-to-stand/stand-to-sit;bed-to-chair/chair-to-bed;commode, bedside with drop arms  -CS     [place] Level/Cues Needed (Transfer Goal 1, OT) moderate assist (50-74% patient effort)  -CS     Time Frame (Transfer Goal 1, OT) long term goal (LTG);by discharge  -CS     Progress/Outcome (Transfer Goal 1, OT) goal not met  -     Row Name 04/17/23 1027          Bathing Goal 1 (OT)    Activity/Device (Bathing Goal 1, OT) lower body bathing  -CS     [place] Level/Cues Needed (Bathing Goal 1, OT) moderate assist (50-74% patient effort)  -CS     Time Frame (Bathing Goal 1, OT) long term goal (LTG);by discharge  -CS     Progress/Outcomes (Bathing Goal 1, OT) goal not met  -     Row Name 04/17/23 1027          Dressing Goal 1 (OT)    Activity/Device (Dressing Goal 1, OT) upper body dressing  -CS     [place]/Cues Needed (Dressing Goal 1, OT) set-up required  -CS     Time Frame (Dressing Goal 1, OT) long term goal (LTG);by discharge  -CS     Progress/Outcome (Dressing Goal 1, OT) goal not met;good progress toward goal  -CS     Row Name 04/17/23 1027          Toileting Goal 1 (OT)    Activity/Device (Toileting Goal 1, OT) toileting skills, all;adjust/manage clothing;perform perineal hygiene;commode, 3-in-1  -CS     [place] Level/Cues Needed (Toileting Goal 1, OT) minimum assist (75% or more patient effort)  -CS     Time Frame (Toileting Goal 1, OT) long term goal (LTG);by discharge  -CS     Progress/Outcome (Toileting Goal 1, OT) goal not met  -CS           User Key  (r) = Recorded By, (t) = Taken By, (c) = Cosigned By    Initials Name Provider Type    CS Rosario Guzman COTA Occupational Therapist Assistant               Clinical Impression     Row Name 04/17/23 1027          Pain Assessment    Pretreatment Pain  Rating 2/10  -CS     Posttreatment Pain Rating 2/10  -CS     Pain Location upper  -CS     Pain Location - extremity;hip  -CS     Pain Intervention(s) Medication (See MAR);Rest;Distraction  -CS     Row Name 04/17/23 1027          Plan of Care Review    Plan of Care Reviewed With patient  -CS     Progress improving  -CS     Outcome Evaluation Pt was sitting up in chair upon arrival. Pt gave good effort with UE ther ex. Pt was I with feeding. Pt was set with grooming task. Discussed home safety and AE. Continue OT POC.  -CS     Row Name 04/17/23 1027          Therapy Assessment/Plan (OT)    Rehab Potential (OT) good, to achieve stated therapy goals  -CS     Criteria for Skilled Therapeutic Interventions Met (OT) yes;meets criteria  -CS     Therapy Frequency (OT) daily  -CS     Row Name 04/17/23 1027          Therapy Plan Review/Discharge Plan (OT)    Anticipated Discharge Disposition (OT) inpatient rehabilitation facility  -CS     Row Name 04/17/23 1027          Vital Signs    Pre Patient Position Sitting  -CS     Post Patient Position Sitting  -CS     Row Name 04/17/23 1027          Positioning and Restraints    Pre-Treatment Position sitting in chair/recliner  -CS     Post Treatment Position chair  -CS     In Chair sitting;call light within reach;encouraged to call for assist;with family/caregiver  -CS           User Key  (r) = Recorded By, (t) = Taken By, (c) = Cosigned By    Initials Name Provider Type    CS Rosario Guzman COTA Occupational Therapist Assistant               Outcome Measures     Row Name 04/17/23 1027          How much help from another is currently needed...    Putting on and taking off regular lower body clothing? 1  -CS     Bathing (including washing, rinsing, and drying) 3  -CS     Toileting (which includes using toilet bed pan or urinal) 2  -CS     Putting on and taking off regular upper body clothing 3  -CS     Taking care of personal grooming (such as brushing teeth) 4  -CS     Eating  meals 4  -CS     AM-PAC 6 Clicks Score (OT) 17  -CS     Row Name 04/17/23 0810          How much help from another person do you currently need...    Turning from your back to your side while in flat bed without using bedrails? 2  -TH     Moving from lying on back to sitting on the side of a flat bed without bedrails? 2  -TH     Moving to and from a bed to a chair (including a wheelchair)? 2  -TH     Standing up from a chair using your arms (e.g., wheelchair, bedside chair)? 2  -TH     Climbing 3-5 steps with a railing? 1  -TH     To walk in hospital room? 1  -TH     AM-PAC 6 Clicks Score (PT) 10  -TH     Highest level of mobility 4 --> Transferred to chair/commode  -TH           User Key  (r) = Recorded By, (t) = Taken By, (c) = Cosigned By    Initials Name Provider Type     Rosario Guzman COTA Occupational Therapist Assistant     Hortensia Zuleta RN Registered Nurse                Occupational Therapy Education     Title: PT OT SLP Therapies (In Progress)     Topic: Occupational Therapy (Done)     Point: ADL training (Done)     Description:   Instruct learner(s) on proper safety adaptation and remediation techniques during self care or transfers.   Instruct in proper use of assistive devices.              Learning Progress Summary           Patient Acceptance, E,TB,D, VU by  at 4/17/2023 1208    Acceptance, E,TB, VU by LW at 4/16/2023 1259    Acceptance, E,TB,H, VU by LW at 4/15/2023 1321    Comment: Educated patient on fall precautions and Home safety    Acceptance, E,TB, VU by CM at 4/14/2023 1411    Comment: OT POC, role of OT, d/c recommendations, NWB status   Family Acceptance, E,TB, VU by CM at 4/14/2023 1411    Comment: OT POC, role of OT, d/c recommendations, NWB status                   Point: Home exercise program (Done)     Description:   Instruct learner(s) on appropriate technique for monitoring, assisting and/or progressing therapeutic exercises/activities.              Learning Progress  Summary           Patient Acceptance, E,TB,D, VU by CS at 4/17/2023 1208    Acceptance, E,TB, VU by LW at 4/16/2023 1259    Acceptance, E,TB,H, VU by LW at 4/15/2023 1321    Comment: Educated patient on fall precautions and Home safety                   Point: Precautions (Done)     Description:   Instruct learner(s) on prescribed precautions during self-care and functional transfers.              Learning Progress Summary           Patient Acceptance, E,TB,D, VU by CS at 4/17/2023 1208    Acceptance, E,TB, VU by LW at 4/16/2023 1259    Acceptance, E,TB,H, VU by LW at 4/15/2023 1321    Comment: Educated patient on fall precautions and Home safety    Acceptance, E,TB, VU by CM at 4/14/2023 1411    Comment: OT POC, role of OT, d/c recommendations, NWB status   Family Acceptance, E,TB, VU by CM at 4/14/2023 1411    Comment: OT POC, role of OT, d/c recommendations, NWB status                   Point: Body mechanics (Done)     Description:   Instruct learner(s) on proper positioning and spine alignment during self-care, functional mobility activities and/or exercises.              Learning Progress Summary           Patient Acceptance, E,TB,D, VU by CS at 4/17/2023 1208    Acceptance, E,TB, VU by LW at 4/16/2023 1259    Acceptance, E,TB,H, VU by LW at 4/15/2023 1321    Comment: Educated patient on fall precautions and Home safety    Acceptance, E,TB, VU by CM at 4/14/2023 1411    Comment: OT POC, role of OT, d/c recommendations, NWB status   Family Acceptance, E,TB, VU by CM at 4/14/2023 1411    Comment: OT POC, role of OT, d/c recommendations, NWB status                               User Key     Initials Effective Dates Name Provider Type Discipline     06/16/21 -  Rosario Guzman COTA Occupational Therapist Assistant OT    LW 06/16/21 -  Oanh Pacheco COTA Occupational Therapist Assistant OT    CM 11/18/22 -  Sameera Kang OT Occupational Therapist OT              OT Recommendation and Plan  Therapy  Frequency (OT): daily  Plan of Care Review  Plan of Care Reviewed With: patient  Progress: improving  Outcome Evaluation: Pt was sitting up in chair upon arrival. Pt gave good effort with UE ther ex. Pt was I with feeding. Pt was set with grooming task. Discussed home safety and AE. Continue OT POC.     Time Calculation:    Time Calculation- OT     Row Name 04/17/23 1209             Time Calculation- OT    OT Start Time 1027  -CS      OT Stop Time 1126  -CS      OT Time Calculation (min) 59 min  -CS      Total Timed Code Minutes- OT 59 minute(s)  -CS      OT Received On 04/17/23  -CS         Timed Charges    50032 - OT Therapeutic Exercise Minutes 45  -CS      94307 - OT Self Care/Mgmt Minutes 14  -CS         Total Minutes    Timed Charges Total Minutes 59  -CS       Total Minutes 59  -CS            User Key  (r) = Recorded By, (t) = Taken By, (c) = Cosigned By    Initials Name Provider Type    CS Rosario Guzman COTA Occupational Therapist Assistant              Therapy Charges for Today     Code Description Service Date Service Provider Modifiers Qty    62582857373 HC OT THER PROC EA 15 MIN 4/17/2023 Rosario Guzman COTA GO 3    08715133716 HC OT SELF CARE/MGMT/TRAIN EA 15 MIN 4/17/2023 Rosario Guzman COTA GO 1               ZIGGY Martinez  4/17/2023

## 2023-04-17 NOTE — PLAN OF CARE
Goal Outcome Evaluation:  Plan of Care Reviewed With: patient        Progress: improving  Outcome Evaluation: Pt was sitting up in chair upon arrival. Pt gave good effort with UE ther ex. Pt was I with feeding. Pt was set with grooming task. Discussed home safety and AE. Continue OT POC.

## 2023-04-18 LAB
ANION GAP SERPL CALCULATED.3IONS-SCNC: 12 MMOL/L (ref 5–15)
BASOPHILS # BLD AUTO: 0.04 10*3/MM3 (ref 0–0.2)
BASOPHILS NFR BLD AUTO: 0.8 % (ref 0–1.5)
BUN SERPL-MCNC: 20 MG/DL (ref 8–23)
BUN/CREAT SERPL: 19 (ref 7–25)
CALCIUM SPEC-SCNC: 9.1 MG/DL (ref 8.6–10.5)
CHLORIDE SERPL-SCNC: 102 MMOL/L (ref 98–107)
CO2 SERPL-SCNC: 23 MMOL/L (ref 22–29)
CREAT SERPL-MCNC: 1.05 MG/DL (ref 0.76–1.27)
DEPRECATED RDW RBC AUTO: 46.2 FL (ref 37–54)
EGFRCR SERPLBLD CKD-EPI 2021: 80.8 ML/MIN/1.73
EOSINOPHIL # BLD AUTO: 0.21 10*3/MM3 (ref 0–0.4)
EOSINOPHIL NFR BLD AUTO: 4.1 % (ref 0.3–6.2)
ERYTHROCYTE [DISTWIDTH] IN BLOOD BY AUTOMATED COUNT: 13.8 % (ref 12.3–15.4)
GLUCOSE BLDC GLUCOMTR-MCNC: 109 MG/DL (ref 70–130)
GLUCOSE BLDC GLUCOMTR-MCNC: 122 MG/DL (ref 70–130)
GLUCOSE BLDC GLUCOMTR-MCNC: 129 MG/DL (ref 70–130)
GLUCOSE BLDC GLUCOMTR-MCNC: 143 MG/DL (ref 70–130)
GLUCOSE SERPL-MCNC: 103 MG/DL (ref 65–99)
HCT VFR BLD AUTO: 34.7 % (ref 37.5–51)
HGB BLD-MCNC: 11.4 G/DL (ref 13–17.7)
IMM GRANULOCYTES # BLD AUTO: 0.05 10*3/MM3 (ref 0–0.05)
IMM GRANULOCYTES NFR BLD AUTO: 1 % (ref 0–0.5)
LYMPHOCYTES # BLD AUTO: 1.06 10*3/MM3 (ref 0.7–3.1)
LYMPHOCYTES NFR BLD AUTO: 20.5 % (ref 19.6–45.3)
MCH RBC QN AUTO: 30.2 PG (ref 26.6–33)
MCHC RBC AUTO-ENTMCNC: 32.9 G/DL (ref 31.5–35.7)
MCV RBC AUTO: 92 FL (ref 79–97)
MONOCYTES # BLD AUTO: 0.61 10*3/MM3 (ref 0.1–0.9)
MONOCYTES NFR BLD AUTO: 11.8 % (ref 5–12)
NEUTROPHILS NFR BLD AUTO: 3.21 10*3/MM3 (ref 1.7–7)
NEUTROPHILS NFR BLD AUTO: 61.8 % (ref 42.7–76)
NRBC BLD AUTO-RTO: 0 /100 WBC (ref 0–0.2)
PLATELET # BLD AUTO: 181 10*3/MM3 (ref 140–450)
PMV BLD AUTO: 10.1 FL (ref 6–12)
POTASSIUM SERPL-SCNC: 4.3 MMOL/L (ref 3.5–5.2)
RBC # BLD AUTO: 3.77 10*6/MM3 (ref 4.14–5.8)
REF LAB TEST METHOD: NORMAL
SODIUM SERPL-SCNC: 137 MMOL/L (ref 136–145)
WBC NRBC COR # BLD: 5.18 10*3/MM3 (ref 3.4–10.8)

## 2023-04-18 PROCEDURE — 80048 BASIC METABOLIC PNL TOTAL CA: CPT | Performed by: PODIATRIST

## 2023-04-18 PROCEDURE — 82962 GLUCOSE BLOOD TEST: CPT

## 2023-04-18 PROCEDURE — 85025 COMPLETE CBC W/AUTO DIFF WBC: CPT | Performed by: PODIATRIST

## 2023-04-18 PROCEDURE — 25010000002 VANCOMYCIN 10 G RECONSTITUTED SOLUTION: Performed by: PODIATRIST

## 2023-04-18 PROCEDURE — 25010000002 AMPICILLIN-SULBACTAM PER 1.5 G: Performed by: PODIATRIST

## 2023-04-18 PROCEDURE — 97535 SELF CARE MNGMENT TRAINING: CPT

## 2023-04-18 PROCEDURE — 97110 THERAPEUTIC EXERCISES: CPT

## 2023-04-18 PROCEDURE — 25010000002 HEPARIN (PORCINE) PER 1000 UNITS: Performed by: PODIATRIST

## 2023-04-18 RX ADMIN — DRONEDARONE 400 MG: 400 TABLET, FILM COATED ORAL at 21:08

## 2023-04-18 RX ADMIN — HEPARIN SODIUM 5000 UNITS: 5000 INJECTION INTRAVENOUS; SUBCUTANEOUS at 21:10

## 2023-04-18 RX ADMIN — METOPROLOL SUCCINATE 25 MG: 25 TABLET, FILM COATED, EXTENDED RELEASE ORAL at 21:08

## 2023-04-18 RX ADMIN — VANCOMYCIN HYDROCHLORIDE 1750 MG: 10 INJECTION, POWDER, LYOPHILIZED, FOR SOLUTION INTRAVENOUS at 01:22

## 2023-04-18 RX ADMIN — VANCOMYCIN HYDROCHLORIDE 1750 MG: 10 INJECTION, POWDER, LYOPHILIZED, FOR SOLUTION INTRAVENOUS at 14:02

## 2023-04-18 RX ADMIN — METFORMIN HYDROCHLORIDE 1000 MG: 500 TABLET, FILM COATED ORAL at 18:34

## 2023-04-18 RX ADMIN — HEPARIN SODIUM 5000 UNITS: 5000 INJECTION INTRAVENOUS; SUBCUTANEOUS at 14:12

## 2023-04-18 RX ADMIN — Medication 10 ML: at 21:08

## 2023-04-18 RX ADMIN — AMPICILLIN SODIUM AND SULBACTAM SODIUM 3 G: 2; 1 INJECTION, POWDER, FOR SOLUTION INTRAMUSCULAR; INTRAVENOUS at 13:18

## 2023-04-18 RX ADMIN — CALCIUM CARBONATE (ANTACID) CHEW TAB 500 MG 2 TABLET: 500 CHEW TAB at 09:55

## 2023-04-18 RX ADMIN — AMPICILLIN SODIUM AND SULBACTAM SODIUM 3 G: 2; 1 INJECTION, POWDER, FOR SOLUTION INTRAMUSCULAR; INTRAVENOUS at 08:30

## 2023-04-18 RX ADMIN — ACETAMINOPHEN 650 MG: 325 TABLET ORAL at 03:26

## 2023-04-18 RX ADMIN — CHOLECALCIFEROL TAB 125 MCG (5000 UNIT) 5000 UNITS: 125 TAB at 21:08

## 2023-04-18 RX ADMIN — OXYCODONE HYDROCHLORIDE AND ACETAMINOPHEN 1000 MG: 500 TABLET ORAL at 21:08

## 2023-04-18 RX ADMIN — Medication 400 MG: at 21:08

## 2023-04-18 RX ADMIN — CALCIUM CARBONATE (ANTACID) CHEW TAB 500 MG 2 TABLET: 500 CHEW TAB at 16:21

## 2023-04-18 RX ADMIN — HEPARIN SODIUM 5000 UNITS: 5000 INJECTION INTRAVENOUS; SUBCUTANEOUS at 06:44

## 2023-04-18 RX ADMIN — LOSARTAN POTASSIUM 25 MG: 25 TABLET, FILM COATED ORAL at 08:32

## 2023-04-18 RX ADMIN — AMPICILLIN SODIUM AND SULBACTAM SODIUM 3 G: 2; 1 INJECTION, POWDER, FOR SOLUTION INTRAMUSCULAR; INTRAVENOUS at 00:08

## 2023-04-18 RX ADMIN — Medication 400 MG: at 09:55

## 2023-04-18 RX ADMIN — SODIUM CHLORIDE 100 ML/HR: 9 INJECTION, SOLUTION INTRAVENOUS at 13:17

## 2023-04-18 RX ADMIN — AMPICILLIN SODIUM AND SULBACTAM SODIUM 3 G: 2; 1 INJECTION, POWDER, FOR SOLUTION INTRAMUSCULAR; INTRAVENOUS at 18:33

## 2023-04-18 RX ADMIN — METFORMIN HYDROCHLORIDE 1000 MG: 500 TABLET, FILM COATED ORAL at 08:31

## 2023-04-18 RX ADMIN — CETIRIZINE HYDROCHLORIDE 10 MG: 10 TABLET, FILM COATED ORAL at 08:33

## 2023-04-18 RX ADMIN — GLIPIZIDE 10 MG: 5 TABLET ORAL at 08:33

## 2023-04-18 RX ADMIN — DOCUSATE SODIUM 50 MG AND SENNOSIDES 8.6 MG 1 TABLET: 8.6; 5 TABLET, FILM COATED ORAL at 08:31

## 2023-04-18 RX ADMIN — ACETAMINOPHEN 650 MG: 325 TABLET ORAL at 13:34

## 2023-04-18 RX ADMIN — METOPROLOL SUCCINATE 25 MG: 25 TABLET, FILM COATED, EXTENDED RELEASE ORAL at 08:33

## 2023-04-18 NOTE — PLAN OF CARE
Goal Outcome Evaluation:  Plan of Care Reviewed With: patient           Outcome Evaluation: Pt sat in the chair until 0300. Administered PO Tylenol once for right hip and left shoulder pain. VSS. No distress noted.

## 2023-04-18 NOTE — PROGRESS NOTES
"Pharmacokinetics by Pharmacy - Vancomycin    Emre Lisa is a 63 y.o. male receiving vancomycin 1750 mg IV q12hr (day 6) for bone and/or joint infection.  Patient is also receiving ampicillin/sulbactam.    Objective:    Temp Readings from Last 1 Encounters:   04/18/23 97.7 °F (36.5 °C) (Oral)     No results found for: WBC No results found for: CRP, LACTATE   Creatinine   Date Value Ref Range Status   04/17/2023 0.90 0.76 - 1.27 mg/dL Final       Vancomycin Peak   Date Value Ref Range Status   04/17/2023 29.40 20.00 - 40.00 mcg/mL Final     Vancomycin Trough   Date Value Ref Range Status   04/17/2023 14.40 5.00 - 20.00 mcg/mL Final   04/15/2023 11.60 5.00 - 20.00 mcg/mL Final       Culture Results:  Microbiology Results (last 10 days)     ** No results found for the last 240 hours. **        No results found for: RESPCX    [Ht: 182.9 cm (72\"); Wt: (!) 168 kg (370 lb)]   Body mass index is 50.18 kg/m².  Ideal body weight: 77.6 kg (171 lb 1.2 oz)  Adjusted ideal body weight: 114 kg (250 lb 10.3 oz)      Assessment:  • WBCs WNL on 4/15, afebrile  • Creatinine 0.9 mcg/mL and appears stable.   • No cultures  • Vancomycin levels WNL on 4/17.    • AUC and trough goals are 400-600 and 10-20 mcg/mL, respectively.     Plan:  1. Continue Vancomycin 1750 mg IV q12hrs   2. Vancomycin levels when clinically indicated. Consulted until 5/25.  3. Pharmacy will monitor renal function and adjust dose accordingly.    Thank you for this consult.     Estiven Cannon, [facility]   04/18/23 09:36 CDT    "

## 2023-04-18 NOTE — THERAPY TREATMENT NOTE
Patient Name: Emre Lisa  : 1960    MRN: [id number]                              Today's Date: 2023       Admit Date: 2023    Visit Dx:     ICD-10-CM ICD-9-CM   1. Cellulitis of left foot  L03.116 682.7   2. Charcot's joint of left foot  M14.672 094.0     713.5   3. Type 2 diabetes mellitus with foot ulcer, unspecified whether long term insulin use  E11.621 250.80    L97.509 707.15   4. Impaired functional mobility, balance, gait, and endurance  Z74.09 V49.89   5. Impaired mobility and ADLs  Z74.09 V49.89    Z78.9      Patient Active Problem List   Diagnosis   • Foot osteomyelitis, right   • Nodule of groin   • Type 2 diabetes mellitus with skin complication   • Status post transmetatarsal amputation of right foot   • Hammer toe of left foot   • Hallux malleus of left foot   • Cellulitis of left foot   • Infected hardware in left leg   • Chronic multifocal osteomyelitis of left foot   • Charcot's joint of left foot   • Skin ulcer of left foot, limited to breakdown of skin     Past Medical History:   Diagnosis Date   • Arthritis    • Atrial fibrillation    • Diabetes mellitus    • Diabetic foot ulcer associated with type 2 diabetes mellitus     left great toe   • Hallux malleus of left foot    • Hammer toe    • Hypertension    • MI (myocardial infarction)    • Neuropathy in diabetes    • Onychomycosis    • Presence of biventricular cardiac pacemaker    • Rheumatoid arthritis      Past Surgical History:   Procedure Laterality Date   • AMPUTATION DIGIT Right 3/5/2017    Procedure: RIGHT AMPUTATION TRANSMETATRASAL , RECESSION GASTROCNEMOUS;  Surgeon: Marco A Thompson DPM;  Location: [facility];  Service:    • CARDIAC DEFIBRILLATOR PLACEMENT  ,    • CARPAL TUNNEL RELEASE  2015    elbow and wrist left arm   • FOOT IRRIGATION, DEBRIDEMENT AND REPAIR Left 3/7/2018    Procedure: FOOT IRRIGATION, DEBRIDEMENT AND REPAIR;  Surgeon: Marco A Thompson DPM;  Location: [facility];  Service:    • FOOT  IRRIGATION, DEBRIDEMENT AND REPAIR Left 3/10/2018    Procedure: FOOT IRRIGATION, DEBRIDEMENT AND REPAIR;  Surgeon: Marco A Thompson DPM;  Location: [facility] OR;  Service: Podiatry   • FOOT WOUND CLOSURE Right 3/2/2017    Procedure: INCISION AND DRAINAGE, RIGHT FOOT;  Surgeon: Tung Rosenthal DPM;  Location: [facility] OR;  Service:    • HAMMER TOE REPAIR Left 2/15/2018    Procedure: HAMMERTOE CORRECTION LEFT SECOND, THIRD AND FOURTH TOES AND HALLUX INTERPHALANGEAL JOINT ARTHRODESIS LEFT FOOT (Micro Asnis, 4.0 & 5.0 Asnis)      (c-arm);  Surgeon: Marco A Thompson DPM;  Location: [facility] OR;  Service:    • HARDWARE REMOVAL Left 3/5/2018    Procedure: ANKLE/FOOT HARDWARE REMOVAL;  Surgeon: Marco A Thompson DPM;  Location: [facility] OR;  Service:    • INCISION AND DRAINAGE LEG Left 3/5/2018    Procedure: INCISION AND DRAINAGE LOWER EXTREMITY;  Surgeon: Marco A Thompson DPM;  Location: [facility] OR;  Service:    • PLANTAR FASCIA RELEASE Left 11/21/2019    Procedure: PLANTAR PLANING LEFT FOOT AND ALL OTHER INDICATED PROCEDURES;  Surgeon: Marco A Thompson DPM;  Location: [facility];  Service: Podiatry   • TOE AMPUTATION Right 2016   • TONSILECTOMY, ADENOIDECTOMY, BILATERAL MYRINGOTOMY AND TUBES      age 25      General Information     Row Name 04/18/23 1220          Physical Therapy Time and Intention    Document Type therapy note (daily note)  -     Mode of Treatment individual therapy;physical therapy  -     Row Name 04/18/23 1220          General Information    Patient Profile Reviewed yes  -     Existing Precautions/Restrictions fall;non-weight bearing;left   -     Row Name 04/18/23 1220          Safety Issues, Functional Mobility    Impairments Affecting Function (Mobility) strength;pain  -           User Key  (r) = Recorded By, (t) = Taken By, (c) = Cosigned By    Initials Name Provider Type     Yanet Vega PT Physical Therapist               Mobility     Row Name 04/18/23 1220          Mobility    Extremity  Weight-bearing Status left lower extremity   -HM     Left Lower Extremity (Weight-bearing Status) non weight-bearing (NWB)   -HM           User Key  (r) = Recorded By, (t) = Taken By, (c) = Cosigned By    Initials Name Provider Type     Yanet Vega PT Physical Therapist               Obj/Interventions     Row Name 04/18/23 1220          Motor Skills    Therapeutic Exercise hip;knee;ankle  -     Row Name 04/18/23 1220          Hip (Therapeutic Exercise)    Hip (Therapeutic Exercise) strengthening exercise  -     Hip Strengthening (Therapeutic Exercise) right;marching while seated;3 lb free weight;3 sets;10 repetitions  -     Row Name 04/18/23 1220          Knee (Therapeutic Exercise)    Knee (Therapeutic Exercise) strengthening exercise  -     Knee Strengthening (Therapeutic Exercise) right;LAQ (long arc quad);3 lb free weight;3 sets;10 repetitions;hamstring curls;resistance tubing;blue  L LAQ only 3x10  -     Row Name 04/18/23 1220          Ankle (Therapeutic Exercise)    Ankle (Therapeutic Exercise) strengthening exercise  -     Ankle Strengthening (Therapeutic Exercise) right;dorsiflexion;plantarflexion;3 sets;10 repetitions  -           User Key  (r) = Recorded By, (t) = Taken By, (c) = Cosigned By    Initials Name Provider Type     Yanet Vega PT Physical Therapist               Goals/Plan     Row Name 04/18/23 1220          Bed Mobility Goal 1 (PT)    Activity/Assistive Device (Bed Mobility Goal 1, PT) bed mobility activities, all  -HM     [place] Level/Cues Needed (Bed Mobility Goal 1, PT) moderate assist (50-74% patient effort)  -HM     Time Frame (Bed Mobility Goal 1, PT) by discharge  -HM     Strategies/Barriers (Bed Mobility Goal 1, PT) while maintaining WB precautions  -HM     Progress/Outcomes (Bed Mobility Goal 1, PT) new goal;goal not met  -     Row Name 04/18/23 1220          Transfer Goal 1 (PT)    Activity/Assistive Device (Transfer Goal 1, PT)  sit-to-stand/stand-to-sit;bed-to-chair/chair-to-bed  -     [place] Level/Cues Needed (Transfer Goal 1, PT) moderate assist (50-74% patient effort)  -     Time Frame (Transfer Goal 1, PT) by discharge  -     Strategies/Barriers (Transfers Goal 1, PT) While maintaining WB precautions  -HM     Progress/Outcome (Transfer Goal 1, PT) goal not met;good progress toward goal  -     Row Name 04/18/23 1220          Problem Specific Goal 1 (PT)    Problem Specific Goal 1 (PT) Improve functional LE strength on the R to be able to complete x5 STS w/modA to maxA  -HM     Time Frame (Problem Specific Goal 1, PT) by discharge  -     Progress/Outcome (Problem Specific Goal 1, PT) good progress toward goal;goal not met  -           User Key  (r) = Recorded By, (t) = Taken By, (c) = Cosigned By    Initials Name Provider Type     Yanet Vega, PT Physical Therapist               Clinical Impression     Row Name 04/18/23 1220          Pain    Pretreatment Pain Rating 5/10  -     Posttreatment Pain Rating 5/10  -     Pain Location - Side/Orientation Right  -     Pain Location - knee  -     Pain Intervention(s) Rest;Ambulation/increased activity  -     Row Name 04/18/23 1220          Plan of Care Review    Outcome Evaluation PT tx: Patient seated in chair upon arrival. Agreeable to therapy however his R knee is sore today and feels it's just wore out. Patient agreed to work on seated strengthening today. PT cued patient in R-sided resisted strengthening including marches, LAQ w/3# AQ 3x10, hamstring curls w/blue TB 3x10 and ankle DF 3x10; add squeezes 3x10, and L LAQ 3x10. Patient required recovery breaks between each set for mm fatigue but able to perform. Patient continues to show decreased LE strength and mm endurance but improving and would benefit from continued skilled PT.  -     Row Name 04/18/23 1220          Vital Signs    Pre Systolic BP Rehab 127  -HM     Pre Treatment Diastolic BP 68  -HM      Pretreatment Heart Rate (beats/min) 92  -HM     Pre SpO2 (%) 98  -HM     O2 Delivery Pre Treatment room air  -     Pre Patient Position Sitting  -     Post Patient Position Sitting  -     Row Name 04/18/23 1220          Positioning and Restraints    Pre-Treatment Position sitting in chair/recliner  -     Post Treatment Position chair  -HM     In Chair sitting;call light within reach;encouraged to call for assist;exit alarm on  -           User Key  (r) = Recorded By, (t) = Taken By, (c) = Cosigned By    Initials Name Provider Type     Yanet Vega, PT Physical Therapist               Outcome Measures     Row Name 04/18/23 1220 04/18/23 0813       How much help from another person do you currently need...    Turning from your back to your side while in flat bed without using bedrails? 2  - 2  -TH    Moving from lying on back to sitting on the side of a flat bed without bedrails? 2  - 2  -TH    Moving to and from a bed to a chair (including a wheelchair)? 2  - 2  -TH    Standing up from a chair using your arms (e.g., wheelchair, bedside chair)? 2  - 2  -TH    Climbing 3-5 steps with a railing? 1  - 1  -TH    To walk in hospital room? 2  - 2  -TH    AM-PAC 6 Clicks Score (PT) 11  - 11  -TH    Highest level of mobility 4 --> Transferred to chair/commode  - 4 --> Transferred to chair/commode  -TH    Row Name 04/18/23 1220          Functional Assessment    Outcome Measure Options AM-PAC 6 Clicks Basic Mobility (PT)  -           User Key  (r) = Recorded By, (t) = Taken By, (c) = Cosigned By    Initials Name Provider Type     Yanet Vega, PT Physical Therapist    TH Hortensia Zuleta, RN Registered Nurse                             Physical Therapy Education     Title: PT OT SLP Therapies (In Progress)     Topic: Physical Therapy (In Progress)     Point: Mobility training (Done)     Learning Progress Summary           Patient Acceptance, E, VU by  at 4/14/2023 1242    Comment: POC  and goals   Significant Other Acceptance, E, VU by  at 4/14/2023 1242    Comment: POC and goals                   Point: Home exercise program (Not Started)     Learner Progress:  Not documented in this visit.          Point: Body mechanics (Not Started)     Learner Progress:  Not documented in this visit.          Point: Precautions (Done)     Learning Progress Summary           Patient Acceptance, E, VU by  at 4/14/2023 1242    Comment: POC and goals   Significant Other Acceptance, E, VU by  at 4/14/2023 1242    Comment: POC and goals                               User Key     Initials Effective Dates Name Provider Type Discipline     01/09/23 -  Yanet Vega, PT Physical Therapist PT              PT Recommendation and Plan  Planned Therapy Interventions (PT): balance training, bed mobility training, gait training, neuromuscular re-education, patient/family education, ROM (range of motion), strengthening, stretching, transfer training, stair training  Plan of Care Reviewed With: patient, spouse  Progress: improving  Outcome Evaluation: PT tx: Patient seated in chair upon arrival. Agreeable to therapy however his R knee is sore today and feels it's just wore out. Patient agreed to work on seated strengthening today. PT cued patient in R-sided resisted strengthening including marches, LAQ w/3# AQ 3x10, hamstring curls w/blue TB 3x10 and ankle DF 3x10; add squeezes 3x10, and L LAQ 3x10. Patient required recovery breaks between each set for mm fatigue but able to perform. Patient continues to show decreased LE strength and mm endurance but improving and would benefit from continued skilled PT.     Time Calculation:    PT Charges     Row Name 04/18/23 1220             Time Calculation    Start Time 1220  -      Stop Time 1252  -      Time Calculation (min) 32 min  -      PT Received On 04/18/23  -         Time Calculation- PT    Total Timed Code Minutes- PT 32 minute(s)  -         Timed Charges     56706 - PT Therapeutic Exercise Minutes 32  -HM         Total Minutes    Timed Charges Total Minutes 32  -HM       Total Minutes 32  -HM            User Key  (r) = Recorded By, (t) = Taken By, (c) = Cosigned By    Initials Name Provider Type     Yanet Vega, PT Physical Therapist              Therapy Charges for Today     Code Description Service Date Service Provider Modifiers Qty    56873378933 HC PT THER PROC EA 15 MIN 4/17/2023 Yanet Vega, PT GP 2    31986138859 HC PT THERAPEUTIC ACT EA 15 MIN 4/17/2023 Yanet Vega, PT GP 1    [id number]  PT THER PROC EA 15 MIN 4/18/2023 Yanet Vega, PT GP 2          PT G-Codes  Outcome Measure Options: AM-PAC 6 Clicks Basic Mobility (PT)  AM-PAC 6 Clicks Score (PT): 11  AM-PAC 6 Clicks Score (OT): 17  PT Discharge Summary  Anticipated Discharge Disposition (PT): [facility] ([facility] hospital)    Yanet Vega PT  4/18/2023

## 2023-04-18 NOTE — THERAPY TREATMENT NOTE
Patient Name: Emre Lisa  : 1960    MRN: [id number]                              Today's Date: 2023       Admit Date: 2023    Visit Dx:     ICD-10-CM ICD-9-CM   1. Cellulitis of left foot  L03.116 682.7   2. Charcot's joint of left foot  M14.672 094.0     713.5   3. Type 2 diabetes mellitus with foot ulcer, unspecified whether long term insulin use  E11.621 250.80    L97.509 707.15   4. Impaired functional mobility, balance, gait, and endurance  Z74.09 V49.89   5. Impaired mobility and ADLs  Z74.09 V49.89    Z78.9      Patient Active Problem List   Diagnosis   • Foot osteomyelitis, right   • Nodule of groin   • Type 2 diabetes mellitus with skin complication   • Status post transmetatarsal amputation of right foot   • Hammer toe of left foot   • Hallux malleus of left foot   • Cellulitis of left foot   • Infected hardware in left leg   • Chronic multifocal osteomyelitis of left foot   • Charcot's joint of left foot   • Skin ulcer of left foot, limited to breakdown of skin     Past Medical History:   Diagnosis Date   • Arthritis    • Atrial fibrillation    • Diabetes mellitus    • Diabetic foot ulcer associated with type 2 diabetes mellitus     left great toe   • Hallux malleus of left foot    • Hammer toe    • Hypertension    • MI (myocardial infarction)    • Neuropathy in diabetes    • Onychomycosis    • Presence of biventricular cardiac pacemaker    • Rheumatoid arthritis      Past Surgical History:   Procedure Laterality Date   • AMPUTATION DIGIT Right 3/5/2017    Procedure: RIGHT AMPUTATION TRANSMETATRASAL , RECESSION GASTROCNEMOUS;  Surgeon: Marco A Thompson DPM;  Location: [facility];  Service:    • CARDIAC DEFIBRILLATOR PLACEMENT  ,    • CARPAL TUNNEL RELEASE  2015    elbow and wrist left arm   • FOOT IRRIGATION, DEBRIDEMENT AND REPAIR Left 3/7/2018    Procedure: FOOT IRRIGATION, DEBRIDEMENT AND REPAIR;  Surgeon: Marco A Thompson DPM;  Location: [facility];  Service:    • FOOT  IRRIGATION, DEBRIDEMENT AND REPAIR Left 3/10/2018    Procedure: FOOT IRRIGATION, DEBRIDEMENT AND REPAIR;  Surgeon: Marco A Thompson DPM;  Location: [facility] OR;  Service: Podiatry   • FOOT WOUND CLOSURE Right 3/2/2017    Procedure: INCISION AND DRAINAGE, RIGHT FOOT;  Surgeon: Tung Rosenthal DPM;  Location: [facility] OR;  Service:    • HAMMER TOE REPAIR Left 2/15/2018    Procedure: HAMMERTOE CORRECTION LEFT SECOND, THIRD AND FOURTH TOES AND HALLUX INTERPHALANGEAL JOINT ARTHRODESIS LEFT FOOT (Micro Asnis, 4.0 & 5.0 Asnis)      (c-arm);  Surgeon: Marco A Thompson DPM;  Location: [facility] OR;  Service:    • HARDWARE REMOVAL Left 3/5/2018    Procedure: ANKLE/FOOT HARDWARE REMOVAL;  Surgeon: Marco A Thompson DPM;  Location: [facility] OR;  Service:    • INCISION AND DRAINAGE LEG Left 3/5/2018    Procedure: INCISION AND DRAINAGE LOWER EXTREMITY;  Surgeon: Marco A Thompson DPM;  Location: [facility] OR;  Service:    • PLANTAR FASCIA RELEASE Left 11/21/2019    Procedure: PLANTAR PLANING LEFT FOOT AND ALL OTHER INDICATED PROCEDURES;  Surgeon: Marco A Thompson DPM;  Location: [facility] OR;  Service: Podiatry   • TOE AMPUTATION Right 2016   • TONSILECTOMY, ADENOIDECTOMY, BILATERAL MYRINGOTOMY AND TUBES      age 25      General Information     Row Name 04/18/23 1033          OT Time and Intention    Document Type therapy note (daily note)  -CS     Mode of Treatment occupational therapy  -CS     Row Name 04/18/23 1033          General Information    Existing Precautions/Restrictions fall;non-weight bearing;left  -CS     Row Name 04/18/23 1033          Cognition    Orientation Status (Cognition) oriented x 4  -CS     Row Name 04/18/23 1033          Safety Issues, Functional Mobility    Impairments Affecting Function (Mobility) strength;pain  -CS           User Key  (r) = Recorded By, (t) = Taken By, (c) = Cosigned By    Initials Name Provider Type    CS Rosario Guzman COTA Occupational Therapist Assistant                 Mobility/ADL's     Row  Name 04/18/23 1033          Transfers    Transfers sit-stand transfer;stand-sit transfer;toilet transfer  -CS     Row Name 04/18/23 1033          Sit-Stand Transfer    Sit-Stand [place] (Transfers) moderate assist (50% patient effort);maximum assist (25% patient effort);2 person assist  -CS     Assistive Device (Sit-Stand Transfers) walker, front-wheeled  -CS     Row Name 04/18/23 1033          Stand-Sit Transfer    Stand-Sit [place] (Transfers) moderate assist (50% patient effort);maximum assist (25% patient effort);2 person assist  -CS     Assistive Device (Stand-Sit Transfers) walker, front-wheeled  -CS     Row Name 04/18/23 1033          Toilet Transfer    Type (Toilet Transfer) stand-sit;sit-stand  -     [place] Level (Toilet Transfer) moderate assist (50% patient effort);maximum assist (25% patient effort);2 person assist  -CS     Assistive Device (Toilet Transfer) commode, bedside without drop arms;walker, front-wheeled  -     Row Name 04/18/23 1033          Activities of Daily Living    BADL Assessment/Intervention bathing;upper body dressing;lower body dressing;grooming;toileting  -     Row Name 04/18/23 1033          Mobility    Extremity Weight-bearing Status left lower extremity  -CS     Left Lower Extremity (Weight-bearing Status) non weight-bearing (NWB)  -     Row Name 04/18/23 1033          Bathing Assessment/Intervention    [place] Level (Bathing) bathing skills;lower body;upper body;standby assist;distal lower extremities/feet;maximum assist (25% patient effort)  -CS     Position (Bathing) supported sitting;supported standing  -CS     Row Name 04/18/23 1033          Upper Body Dressing Assessment/Training    [place] Level (Upper Body Dressing) upper body dressing skills;doff;don;standby assist  -CS     Position (Upper Body Dressing) supported sitting  -CS     Row Name 04/18/23 1033          Lower Body Dressing Assessment/Training    [place] Level (Lower Body  Dressing) doff;don;socks;shoes/slippers;maximum assist (25% patient effort)  -CS     Position (Lower Body Dressing) supported sitting  -CS     Row Name 04/18/23 1033          Grooming Assessment/Training    [place] Level (Grooming) grooming skills;set up  -CS     Position (Grooming) supported sitting  -CS     Row Name 04/18/23 1033          Toileting Assessment/Training    [place] Level (Toileting) toileting skills;perform perineal hygiene;dependent (less than 25% patient effort)  -CS     Assistive Devices (Toileting) commode, bedside without drop arms  -CS     Position (Toileting) supported standing  -CS           User Key  (r) = Recorded By, (t) = Taken By, (c) = Cosigned By    Initials Name Provider Type    CS Rosario Guzman COTA Occupational Therapist Assistant               Obj/Interventions    No documentation.                Goals/Plan     Row Name 04/18/23 1033          Transfer Goal 1 (OT)    Activity/Assistive Device (Transfer Goal 1, OT) sit-to-stand/stand-to-sit;bed-to-chair/chair-to-bed;commode, bedside with drop arms  -CS     [place] Level/Cues Needed (Transfer Goal 1, OT) moderate assist (50-74% patient effort)  -CS     Time Frame (Transfer Goal 1, OT) long term goal (LTG);by discharge  -CS     Progress/Outcome (Transfer Goal 1, OT) goal not met  -     Row Name 04/18/23 1033          Bathing Goal 1 (OT)    Activity/Device (Bathing Goal 1, OT) lower body bathing  -CS     [place] Level/Cues Needed (Bathing Goal 1, OT) moderate assist (50-74% patient effort)  -CS     Time Frame (Bathing Goal 1, OT) long term goal (LTG);by discharge  -CS     Progress/Outcomes (Bathing Goal 1, OT) goal not met  -CS     Row Name 04/18/23 1033          Dressing Goal 1 (OT)    Activity/Device (Dressing Goal 1, OT) upper body dressing  -CS     [place]/Cues Needed (Dressing Goal 1, OT) set-up required  -CS     Time Frame (Dressing Goal 1, OT) long term goal (LTG);by discharge  -CS      Progress/Outcome (Dressing Goal 1, OT) goal not met;good progress toward goal  -CS     Row Name 04/18/23 1033          Toileting Goal 1 (OT)    Activity/Device (Toileting Goal 1, OT) toileting skills, all;adjust/manage clothing;perform perineal hygiene;commode, 3-in-1  -CS     [place] Level/Cues Needed (Toileting Goal 1, OT) minimum assist (75% or more patient effort)  -CS     Time Frame (Toileting Goal 1, OT) long term goal (LTG);by discharge  -CS     Progress/Outcome (Toileting Goal 1, OT) goal not met  -CS           User Key  (r) = Recorded By, (t) = Taken By, (c) = Cosigned By    Initials Name Provider Type    CS Rosario Guzman COTA Occupational Therapist Assistant               Clinical Impression     Row Name 04/18/23 1033          Pain Assessment    Pretreatment Pain Rating 5/10  -CS     Posttreatment Pain Rating 5/10  -CS     Pain Location - Side/Orientation Right  -CS     Pain Location upper  -CS     Pain Location - knee;shoulder  -CS     Pain Intervention(s) Rest;Repositioned;Distraction  -     Row Name 04/18/23 1033          Plan of Care Review    Plan of Care Reviewed With patient  -CS     Progress improving  -CS     Outcome Evaluation Pt was sitting up in chair upon arrival. Pt was mod/max A with sit-stand-sit. Pt was mod/max A x 2 with toilet t/f. Pt was dep with toileting. Pt completed an full ADL. No goals met this tx. Continue OT POC.  -CS     Row Name 04/18/23 1033          Therapy Assessment/Plan (OT)    Rehab Potential (OT) good, to achieve stated therapy goals  -CS     Criteria for Skilled Therapeutic Interventions Met (OT) yes;meets criteria  -CS     Therapy Frequency (OT) daily  -CS     Row Name 04/18/23 1033          Therapy Plan Review/Discharge Plan (OT)    Anticipated Discharge Disposition (OT) inpatient rehabilitation facility;[facility]  -     Row Name 04/18/23 1033          Vital Signs    Pre Patient Position Sitting  -CS     Post Patient Position  Sitting  -CS     Row Name 04/18/23 1033          Positioning and Restraints    Pre-Treatment Position sitting in chair/recliner  -CS     Post Treatment Position chair  -CS     In Chair sitting;call light within reach;encouraged to call for assist;exit alarm on  -CS           User Key  (r) = Recorded By, (t) = Taken By, (c) = Cosigned By    Initials Name Provider Type    Rosario Frye COTA Occupational Therapist Assistant               Outcome Measures     Row Name 04/18/23 1033          How much help from another is currently needed...    Bathing (including washing, rinsing, and drying) 3  -CS     Toileting (which includes using toilet bed pan or urinal) 2  -CS     Putting on and taking off regular upper body clothing 3  -CS     Taking care of personal grooming (such as brushing teeth) 4  -CS     Eating meals 4  -CS     Row Name 04/18/23 1220 04/18/23 0813       How much help from another person do you currently need...    Turning from your back to your side while in flat bed without using bedrails? 2  - 2  -TH    Moving from lying on back to sitting on the side of a flat bed without bedrails? 2  - 2  -TH    Moving to and from a bed to a chair (including a wheelchair)? 2  - 2  -TH    Standing up from a chair using your arms (e.g., wheelchair, bedside chair)? 2  - 2  -TH    Climbing 3-5 steps with a railing? 1  - 1  -TH    To walk in hospital room? 2  - 2  -TH    AM-PAC 6 Clicks Score (PT) 11  - 11  -TH    Highest level of mobility 4 --> Transferred to chair/commode  - 4 --> Transferred to chair/commode  -TH    Row Name 04/18/23 1220          Functional Assessment    Outcome Measure Options AM-PAC 6 Clicks Basic Mobility (PT)  -           User Key  (r) = Recorded By, (t) = Taken By, (c) = Cosigned By    Initials Name Provider Type    Rosario Frye COTA Occupational Therapist Assistant     Yanet Vega, PT Physical Therapist     Hortensia Zuleta, RN Registered Nurse                 Occupational Therapy Education     Title: PT OT SLP Therapies (In Progress)     Topic: Occupational Therapy (Done)     Point: ADL training (Done)     Description:   Instruct learner(s) on proper safety adaptation and remediation techniques during self care or transfers.   Instruct in proper use of assistive devices.              Learning Progress Summary           Patient Acceptance, E,TB,D, VU by CS at 4/17/2023 1208    Acceptance, E,TB, VU by LW at 4/16/2023 1259    Acceptance, E,TB,H, VU by LW at 4/15/2023 1321    Comment: Educated patient on fall precautions and Home safety    Acceptance, E,TB, VU by CM at 4/14/2023 1411    Comment: OT POC, role of OT, d/c recommendations, NWB status   Family Acceptance, E,TB, VU by CM at 4/14/2023 1411    Comment: OT POC, role of OT, d/c recommendations, NWB status                   Point: Home exercise program (Done)     Description:   Instruct learner(s) on appropriate technique for monitoring, assisting and/or progressing therapeutic exercises/activities.              Learning Progress Summary           Patient Acceptance, E,TB,D, VU by CS at 4/17/2023 1208    Acceptance, E,TB, VU by LW at 4/16/2023 1259    Acceptance, E,TB,H, VU by LW at 4/15/2023 1321    Comment: Educated patient on fall precautions and Home safety                   Point: Precautions (Done)     Description:   Instruct learner(s) on prescribed precautions during self-care and functional transfers.              Learning Progress Summary           Patient Acceptance, E,TB,D, VU by CS at 4/17/2023 1208    Acceptance, E,TB, VU by LW at 4/16/2023 1259    Acceptance, E,TB,H, VU by LW at 4/15/2023 1321    Comment: Educated patient on fall precautions and Home safety    Acceptance, E,TB, VU by CM at 4/14/2023 1411    Comment: OT POC, role of OT, d/c recommendations, NWB status   Family Acceptance, E,TB, VU by CM at 4/14/2023 1411    Comment: OT POC, role of OT, d/c recommendations, NWB status                    Point: Body mechanics (Done)     Description:   Instruct learner(s) on proper positioning and spine alignment during self-care, functional mobility activities and/or exercises.              Learning Progress Summary           Patient Acceptance, E,TB,D, VU by CS at 4/17/2023 1208    Acceptance, E,TB, VU by LW at 4/16/2023 1259    Acceptance, E,TB,H, VU by LW at 4/15/2023 1321    Comment: Educated patient on fall precautions and Home safety    Acceptance, E,TB, VU by CM at 4/14/2023 1411    Comment: OT POC, role of OT, d/c recommendations, NWB status   Family Acceptance, E,TB, VU by CM at 4/14/2023 1411    Comment: OT POC, role of OT, d/c recommendations, NWB status                               User Key     Initials Effective Dates Name Provider Type Discipline     06/16/21 -  Rosario Guzman COTA Occupational Therapist Assistant OT    LW 06/16/21 -  Oanh Pacheco COTA Occupational Therapist Assistant OT     11/18/22 -  Sameera Kang OT Occupational Therapist OT              OT Recommendation and Plan  Therapy Frequency (OT): daily  Plan of Care Review  Plan of Care Reviewed With: patient  Progress: improving  Outcome Evaluation: Pt was sitting up in chair upon arrival. Pt was mod/max A with sit-stand-sit. Pt was mod/max A x 2 with toilet t/f. Pt was dep with toileting. Pt completed an full ADL. No goals met this tx. Continue OT POC.     Time Calculation:    Time Calculation- OT     Row Name 04/18/23 1521             Time Calculation- OT    OT Start Time 1033  -CS      OT Stop Time 1200  -CS      OT Time Calculation (min) 87 min  -CS      Total Timed Code Minutes- OT 87 minute(s)  -CS      OT Received On 04/18/23  -CS         Timed Charges    30760 - OT Self Care/Mgmt Minutes 87  -CS         Total Minutes    Timed Charges Total Minutes 87  -CS       Total Minutes 87  -CS            User Key  (r) = Recorded By, (t) = Taken By, (c) = Cosigned By    Initials Name Provider Type    CS Rosario Guzman,  ZIGGY Occupational Therapist Assistant              Therapy Charges for Today     Code Description Service Date Service Provider Modifiers Qty    08903491410 HC OT THER PROC EA 15 MIN 4/17/2023 Rosario Guzman COTA GO 3    50458244480 HC OT SELF CARE/MGMT/TRAIN EA 15 MIN 4/17/2023 Rosario Guzman COTA GO 1    89297552323 HC OT SELF CARE/MGMT/TRAIN EA 15 MIN 4/18/2023 Rosario Guzman COTA GO 6               ZIGGY Martinez  4/18/2023

## 2023-04-18 NOTE — PROGRESS NOTES
Podiatric Surgery Progress Note    Subjective     Post-Operative Day: 5 post-left reduction left ankle deformity with application of external fixator  Systemic or Specific Complaints: No Complaints    Objective     Vital signs in last 24 hours:  Temp:  [97.5 °F (36.4 °C)-98.7 °F (37.1 °C)] 98.2 °F (36.8 °C)  Heart Rate:  [82-92] 92  Resp:  [18] 18  BP: (104-123)/(55-72) 114/61    General: alert, appears stated age and cooperative   Neurovascular:  Sensation left lower extremity absent  Capillary refill: Normal   Wound:  dressing c/d/i. Drain intact with ~15 mL output   Range of Motion:  Absent secondary to frame application   DVT Exam: No evidence of DVT seen on physical exam.     Data Review  CBC:  Results from last 7 days   Lab Units 04/15/23  0611   WBC 10*3/mm3 5.99   RBC 10*6/mm3 3.38*   HEMOGLOBIN g/dL 10.2*   HEMATOCRIT % 31.3*   PLATELETS 10*3/mm3 115*       Assessment & Plan     Charcot left ankle    Patient doing well postoperatively. Drain pulled. Continue heparin for DVT prophylaxis.  Continue IV antibiotics.  LTAC referral pending.  Nonweightbearing left lower extremity. Call with questions.     LOS: 2 days     Marco A Thompson DPM    Date: 4/18/2023  Time: 12:40 CDT

## 2023-04-18 NOTE — PLAN OF CARE
Goal Outcome Evaluation:  Plan of Care Reviewed With: patient, spouse        Progress: improving  Outcome Evaluation: PT tx: Patient seated in chair upon arrival. Agreeable to therapy however his R knee is sore today and feels it's just wore out. Patient agreed to work on seated strengthening today. PT cued patient in R-sided resisted strengthening including marches, LAQ w/3# AQ 3x10, hamstring curls w/blue TB 3x10 and ankle DF 3x10; add squeezes 3x10, and L LAQ 3x10. Patient required recovery breaks between each set for mm fatigue but able to perform. Patient continues to show decreased LE strength and mm endurance but improving and would benefit from continued skilled PT.

## 2023-04-18 NOTE — PLAN OF CARE
Goal Outcome Evaluation:  Plan of Care Reviewed With: patient        Progress: improving  Outcome Evaluation: Pt was sitting up in chair upon arrival. Pt was mod/max A with sit-stand-sit. Pt was mod/max A x 2 with toilet t/f. Pt was dep with toileting. Pt completed an full ADL. No goals met this tx. Continue OT POC.

## 2023-04-18 NOTE — PROGRESS NOTES
Enter Query Response Below      Query Response:  Due to Diabetes Mellitus with Hyperglycemia        This document has been electronically signed by Adis Castellon MD on April 18, 2023 17:30 CDT                   If applicable, please update the problem list.

## 2023-04-19 LAB
ANION GAP SERPL CALCULATED.3IONS-SCNC: 10 MMOL/L (ref 5–15)
BASOPHILS # BLD AUTO: 0.06 10*3/MM3 (ref 0–0.2)
BASOPHILS NFR BLD AUTO: 1.2 % (ref 0–1.5)
BUN SERPL-MCNC: 18 MG/DL (ref 8–23)
BUN/CREAT SERPL: 18.2 (ref 7–25)
CALCIUM SPEC-SCNC: 8.5 MG/DL (ref 8.6–10.5)
CHLORIDE SERPL-SCNC: 105 MMOL/L (ref 98–107)
CO2 SERPL-SCNC: 22 MMOL/L (ref 22–29)
CREAT SERPL-MCNC: 0.99 MG/DL (ref 0.76–1.27)
DEPRECATED RDW RBC AUTO: 44.6 FL (ref 37–54)
EGFRCR SERPLBLD CKD-EPI 2021: 86.7 ML/MIN/1.73
EOSINOPHIL # BLD AUTO: 0.22 10*3/MM3 (ref 0–0.4)
EOSINOPHIL NFR BLD AUTO: 4.3 % (ref 0.3–6.2)
ERYTHROCYTE [DISTWIDTH] IN BLOOD BY AUTOMATED COUNT: 13.8 % (ref 12.3–15.4)
GLUCOSE BLDC GLUCOMTR-MCNC: 112 MG/DL (ref 70–130)
GLUCOSE BLDC GLUCOMTR-MCNC: 125 MG/DL (ref 70–130)
GLUCOSE BLDC GLUCOMTR-MCNC: 143 MG/DL (ref 70–130)
GLUCOSE BLDC GLUCOMTR-MCNC: 91 MG/DL (ref 70–130)
GLUCOSE SERPL-MCNC: 100 MG/DL (ref 65–99)
HCT VFR BLD AUTO: 27.9 % (ref 37.5–51)
HGB BLD-MCNC: 9.5 G/DL (ref 13–17.7)
IMM GRANULOCYTES # BLD AUTO: 0.04 10*3/MM3 (ref 0–0.05)
IMM GRANULOCYTES NFR BLD AUTO: 0.8 % (ref 0–0.5)
LYMPHOCYTES # BLD AUTO: 1.43 10*3/MM3 (ref 0.7–3.1)
LYMPHOCYTES NFR BLD AUTO: 27.8 % (ref 19.6–45.3)
MCH RBC QN AUTO: 30.4 PG (ref 26.6–33)
MCHC RBC AUTO-ENTMCNC: 34.1 G/DL (ref 31.5–35.7)
MCV RBC AUTO: 89.4 FL (ref 79–97)
MONOCYTES # BLD AUTO: 0.72 10*3/MM3 (ref 0.1–0.9)
MONOCYTES NFR BLD AUTO: 14 % (ref 5–12)
NEUTROPHILS NFR BLD AUTO: 2.68 10*3/MM3 (ref 1.7–7)
NEUTROPHILS NFR BLD AUTO: 51.9 % (ref 42.7–76)
NRBC BLD AUTO-RTO: 0 /100 WBC (ref 0–0.2)
PLATELET # BLD AUTO: 136 10*3/MM3 (ref 140–450)
PMV BLD AUTO: 10.1 FL (ref 6–12)
POTASSIUM SERPL-SCNC: 4.1 MMOL/L (ref 3.5–5.2)
RBC # BLD AUTO: 3.12 10*6/MM3 (ref 4.14–5.8)
SODIUM SERPL-SCNC: 137 MMOL/L (ref 136–145)
WBC NRBC COR # BLD: 5.15 10*3/MM3 (ref 3.4–10.8)

## 2023-04-19 PROCEDURE — 97110 THERAPEUTIC EXERCISES: CPT

## 2023-04-19 PROCEDURE — 97535 SELF CARE MNGMENT TRAINING: CPT

## 2023-04-19 PROCEDURE — 25010000002 HEPARIN (PORCINE) PER 1000 UNITS: Performed by: PODIATRIST

## 2023-04-19 PROCEDURE — 97530 THERAPEUTIC ACTIVITIES: CPT

## 2023-04-19 PROCEDURE — 25010000002 AMPICILLIN-SULBACTAM PER 1.5 G: Performed by: PODIATRIST

## 2023-04-19 PROCEDURE — 85025 COMPLETE CBC W/AUTO DIFF WBC: CPT | Performed by: PODIATRIST

## 2023-04-19 PROCEDURE — 99024 POSTOP FOLLOW-UP VISIT: CPT | Performed by: PODIATRIST

## 2023-04-19 PROCEDURE — 82962 GLUCOSE BLOOD TEST: CPT

## 2023-04-19 PROCEDURE — 25010000002 VANCOMYCIN 10 G RECONSTITUTED SOLUTION: Performed by: PODIATRIST

## 2023-04-19 PROCEDURE — 80048 BASIC METABOLIC PNL TOTAL CA: CPT | Performed by: PODIATRIST

## 2023-04-19 RX ADMIN — AMPICILLIN SODIUM AND SULBACTAM SODIUM 3 G: 2; 1 INJECTION, POWDER, FOR SOLUTION INTRAMUSCULAR; INTRAVENOUS at 06:02

## 2023-04-19 RX ADMIN — AMPICILLIN SODIUM AND SULBACTAM SODIUM 3 G: 2; 1 INJECTION, POWDER, FOR SOLUTION INTRAMUSCULAR; INTRAVENOUS at 13:37

## 2023-04-19 RX ADMIN — ACETAMINOPHEN 650 MG: 325 TABLET ORAL at 17:20

## 2023-04-19 RX ADMIN — AMPICILLIN SODIUM AND SULBACTAM SODIUM 3 G: 2; 1 INJECTION, POWDER, FOR SOLUTION INTRAMUSCULAR; INTRAVENOUS at 19:59

## 2023-04-19 RX ADMIN — HEPARIN SODIUM 5000 UNITS: 5000 INJECTION INTRAVENOUS; SUBCUTANEOUS at 21:10

## 2023-04-19 RX ADMIN — SODIUM CHLORIDE 100 ML/HR: 9 INJECTION, SOLUTION INTRAVENOUS at 06:09

## 2023-04-19 RX ADMIN — CHOLECALCIFEROL TAB 125 MCG (5000 UNIT) 5000 UNITS: 125 TAB at 20:08

## 2023-04-19 RX ADMIN — DRONEDARONE 400 MG: 400 TABLET, FILM COATED ORAL at 20:08

## 2023-04-19 RX ADMIN — Medication 400 MG: at 20:08

## 2023-04-19 RX ADMIN — AMPICILLIN SODIUM AND SULBACTAM SODIUM 3 G: 2; 1 INJECTION, POWDER, FOR SOLUTION INTRAMUSCULAR; INTRAVENOUS at 01:02

## 2023-04-19 RX ADMIN — VANCOMYCIN HYDROCHLORIDE 1750 MG: 10 INJECTION, POWDER, LYOPHILIZED, FOR SOLUTION INTRAVENOUS at 13:38

## 2023-04-19 RX ADMIN — Medication 400 MG: at 09:06

## 2023-04-19 RX ADMIN — METOPROLOL SUCCINATE 25 MG: 25 TABLET, FILM COATED, EXTENDED RELEASE ORAL at 20:08

## 2023-04-19 RX ADMIN — OXYCODONE HYDROCHLORIDE AND ACETAMINOPHEN 1000 MG: 500 TABLET ORAL at 20:09

## 2023-04-19 RX ADMIN — Medication 10 ML: at 20:09

## 2023-04-19 RX ADMIN — CETIRIZINE HYDROCHLORIDE 10 MG: 10 TABLET, FILM COATED ORAL at 09:06

## 2023-04-19 RX ADMIN — METFORMIN HYDROCHLORIDE 1000 MG: 500 TABLET, FILM COATED ORAL at 17:19

## 2023-04-19 RX ADMIN — LOSARTAN POTASSIUM 25 MG: 25 TABLET, FILM COATED ORAL at 09:07

## 2023-04-19 RX ADMIN — VANCOMYCIN HYDROCHLORIDE 1750 MG: 10 INJECTION, POWDER, LYOPHILIZED, FOR SOLUTION INTRAVENOUS at 01:48

## 2023-04-19 RX ADMIN — GLIPIZIDE 10 MG: 5 TABLET ORAL at 08:02

## 2023-04-19 RX ADMIN — METOPROLOL SUCCINATE 25 MG: 25 TABLET, FILM COATED, EXTENDED RELEASE ORAL at 09:06

## 2023-04-19 RX ADMIN — ACETAMINOPHEN 650 MG: 325 TABLET ORAL at 09:15

## 2023-04-19 RX ADMIN — ACETAMINOPHEN 650 MG: 325 TABLET ORAL at 01:06

## 2023-04-19 RX ADMIN — HEPARIN SODIUM 5000 UNITS: 5000 INJECTION INTRAVENOUS; SUBCUTANEOUS at 13:37

## 2023-04-19 RX ADMIN — METFORMIN HYDROCHLORIDE 1000 MG: 500 TABLET, FILM COATED ORAL at 08:02

## 2023-04-19 RX ADMIN — HEPARIN SODIUM 5000 UNITS: 5000 INJECTION INTRAVENOUS; SUBCUTANEOUS at 06:03

## 2023-04-19 RX ADMIN — SODIUM CHLORIDE 100 ML/HR: 9 INJECTION, SOLUTION INTRAVENOUS at 17:23

## 2023-04-19 NOTE — THERAPY TREATMENT NOTE
Patient Name: Emre Lisa  : 1960    MRN: [id number]                              Today's Date: 2023       Admit Date: 2023    Visit Dx:     ICD-10-CM ICD-9-CM   1. Cellulitis of left foot  L03.116 682.7   2. Charcot's joint of left foot  M14.672 094.0     713.5   3. Type 2 diabetes mellitus with foot ulcer, unspecified whether long term insulin use  E11.621 250.80    L97.509 707.15   4. Impaired functional mobility, balance, gait, and endurance  Z74.09 V49.89   5. Impaired mobility and ADLs  Z74.09 V49.89    Z78.9      Patient Active Problem List   Diagnosis   • Foot osteomyelitis, right   • Nodule of groin   • Type 2 diabetes mellitus with skin complication   • Status post transmetatarsal amputation of right foot   • Hammer toe of left foot   • Hallux malleus of left foot   • Cellulitis of left foot   • Infected hardware in left leg   • Chronic multifocal osteomyelitis of left foot   • Charcot's joint of left foot   • Skin ulcer of left foot, limited to breakdown of skin     Past Medical History:   Diagnosis Date   • Arthritis    • Atrial fibrillation    • Diabetes mellitus    • Diabetic foot ulcer associated with type 2 diabetes mellitus     left great toe   • Hallux malleus of left foot    • Hammer toe    • Hypertension    • MI (myocardial infarction)    • Neuropathy in diabetes    • Onychomycosis    • Presence of biventricular cardiac pacemaker    • Rheumatoid arthritis      Past Surgical History:   Procedure Laterality Date   • AMPUTATION DIGIT Right 3/5/2017    Procedure: RIGHT AMPUTATION TRANSMETATRASAL , RECESSION GASTROCNEMOUS;  Surgeon: Marco A Thompson DPM;  Location: [facility];  Service:    • CARDIAC DEFIBRILLATOR PLACEMENT  ,    • CARPAL TUNNEL RELEASE  2015    elbow and wrist left arm   • FOOT IRRIGATION, DEBRIDEMENT AND REPAIR Left 3/7/2018    Procedure: FOOT IRRIGATION, DEBRIDEMENT AND REPAIR;  Surgeon: Marco A Thompson DPM;  Location: [facility];  Service:    • FOOT  IRRIGATION, DEBRIDEMENT AND REPAIR Left 3/10/2018    Procedure: FOOT IRRIGATION, DEBRIDEMENT AND REPAIR;  Surgeon: Marco A Thompson DPM;  Location: [facility] OR;  Service: Podiatry   • FOOT WOUND CLOSURE Right 3/2/2017    Procedure: INCISION AND DRAINAGE, RIGHT FOOT;  Surgeon: Tung Rosenthal DPM;  Location: [facility] OR;  Service:    • HAMMER TOE REPAIR Left 2/15/2018    Procedure: HAMMERTOE CORRECTION LEFT SECOND, THIRD AND FOURTH TOES AND HALLUX INTERPHALANGEAL JOINT ARTHRODESIS LEFT FOOT (Micro Asnis, 4.0 & 5.0 Asnis)      (c-arm);  Surgeon: Marco A Thompson DPM;  Location: [facility] OR;  Service:    • HARDWARE REMOVAL Left 3/5/2018    Procedure: ANKLE/FOOT HARDWARE REMOVAL;  Surgeon: Marco A Thompson DPM;  Location: [facility] OR;  Service:    • INCISION AND DRAINAGE LEG Left 3/5/2018    Procedure: INCISION AND DRAINAGE LOWER EXTREMITY;  Surgeon: Marco A Thompson DPM;  Location: [facility] OR;  Service:    • PLANTAR FASCIA RELEASE Left 11/21/2019    Procedure: PLANTAR PLANING LEFT FOOT AND ALL OTHER INDICATED PROCEDURES;  Surgeon: Marco A Thompson DPM;  Location: [facility] OR;  Service: Podiatry   • TOE AMPUTATION Right 2016   • TONSILECTOMY, ADENOIDECTOMY, BILATERAL MYRINGOTOMY AND TUBES      age 25      General Information     Row Name 04/19/23 0800          OT Time and Intention    Document Type therapy note (daily note)  -     Mode of Treatment individual therapy;occupational therapy  -     Row Name 04/19/23 0800          General Information    Patient Profile Reviewed yes  -     Existing Precautions/Restrictions fall;non-weight bearing;left  -     Row Name 04/19/23 0800          Cognition    Orientation Status (Cognition) oriented x 4  -     Row Name 04/19/23 0800          Safety Issues, Functional Mobility    Impairments Affecting Function (Mobility) strength;pain  -           User Key  (r) = Recorded By, (t) = Taken By, (c) = Cosigned By    Initials Name Provider Type    RC Eugenie Ireland COTA Occupational  Therapist Assistant                 Mobility/ADL's    No documentation.                Obj/Interventions    No documentation.                Goals/Plan     Row Name 04/19/23 0800          Transfer Goal 1 (OT)    Activity/Assistive Device (Transfer Goal 1, OT) sit-to-stand/stand-to-sit;bed-to-chair/chair-to-bed;commode, bedside with drop arms  -RC     [place] Level/Cues Needed (Transfer Goal 1, OT) moderate assist (50-74% patient effort)  -RC     Time Frame (Transfer Goal 1, OT) long term goal (LTG);by discharge  -RC     Progress/Outcome (Transfer Goal 1, OT) goal not met  -     Row Name 04/19/23 0800          Bathing Goal 1 (OT)    Activity/Device (Bathing Goal 1, OT) lower body bathing  -RC     [place] Level/Cues Needed (Bathing Goal 1, OT) moderate assist (50-74% patient effort)  -RC     Time Frame (Bathing Goal 1, OT) long term goal (LTG);by discharge  -RC     Progress/Outcomes (Bathing Goal 1, OT) goal not met  -     Row Name 04/19/23 0800          Dressing Goal 1 (OT)    Activity/Device (Dressing Goal 1, OT) upper body dressing  -RC     [place]/Cues Needed (Dressing Goal 1, OT) set-up required  -RC     Time Frame (Dressing Goal 1, OT) long term goal (LTG);by discharge  -RC     Progress/Outcome (Dressing Goal 1, OT) goal not met;good progress toward goal  -     Row Name 04/19/23 0800          Toileting Goal 1 (OT)    Activity/Device (Toileting Goal 1, OT) toileting skills, all;adjust/manage clothing;perform perineal hygiene;commode, 3-in-1  -RC     [place] Level/Cues Needed (Toileting Goal 1, OT) minimum assist (75% or more patient effort)  -RC     Time Frame (Toileting Goal 1, OT) long term goal (LTG);by discharge  -RC     Progress/Outcome (Toileting Goal 1, OT) goal not met  -           User Key  (r) = Recorded By, (t) = Taken By, (c) = Cosigned By    Initials Name Provider Type    RC Eugenie Ireland COTA Occupational Therapist Assistant               Clinical Impression     Row  Name 04/19/23 0800          Pain Assessment    Pretreatment Pain Rating 6/10  -RC     Posttreatment Pain Rating 6/10  -RC     Pain Location - Side/Orientation Right  -RC     Pain Location - knee  -RC     Pre/Posttreatment Pain Comment also pain in b shoulders ar hip and neck  -RC     Row Name 04/19/23 0800          Therapy Assessment/Plan (OT)    Rehab Potential (OT) good, to achieve stated therapy goals  -     Criteria for Skilled Therapeutic Interventions Met (OT) yes;meets criteria  -     Therapy Frequency (OT) daily  -RC     Row Name 04/19/23 0800          Therapy Plan Review/Discharge Plan (OT)    Anticipated Discharge Disposition (OT) inpatient rehabilitation facility;[facility] ([facility])  -           User Key  (r) = Recorded By, (t) = Taken By, (c) = Cosigned By    Initials Name Provider Type    Eugenie Stephen COTA Occupational Therapist Assistant               Outcome Measures     Row Name 04/19/23 0800          How much help from another is currently needed...    Putting on and taking off regular lower body clothing? 1  -RC     Bathing (including washing, rinsing, and drying) 3  -RC     Toileting (which includes using toilet bed pan or urinal) 2  -RC     Putting on and taking off regular upper body clothing 3  -RC     Taking care of personal grooming (such as brushing teeth) 4  -RC     Eating meals 4  -RC     AM-PAC 6 Clicks Score (OT) 17  -RC     Row Name 04/19/23 0945          How much help from another person do you currently need...    Turning from your back to your side while in flat bed without using bedrails? 2  -TH     Moving from lying on back to sitting on the side of a flat bed without bedrails? 2  -TH     Moving to and from a bed to a chair (including a wheelchair)? 2  -TH     Standing up from a chair using your arms (e.g., wheelchair, bedside chair)? 2  -TH     Climbing 3-5 steps with a railing? 1  -TH     To walk in hospital room? 2  -TH     AM-PAC 6 Clicks Score (PT) 11   -TH     Highest level of mobility 4 --> Transferred to chair/commode  -TH     Row Name 04/19/23 0800          Functional Assessment    Outcome Measure Options AM-PAC 6 Clicks Basic Mobility (PT)  -RC           User Key  (r) = Recorded By, (t) = Taken By, (c) = Cosigned By    Initials Name Provider Type    RC Eugenie Ireland COTA Occupational Therapist Assistant    Hortensia Rogel RN Registered Nurse                Occupational Therapy Education     Title: PT OT SLP Therapies (In Progress)     Topic: Occupational Therapy (Done)     Point: ADL training (Done)     Description:   Instruct learner(s) on proper safety adaptation and remediation techniques during self care or transfers.   Instruct in proper use of assistive devices.              Learning Progress Summary           Patient Acceptance, E,TB,D, VU by  at 4/17/2023 1208    Acceptance, E,TB, VU by LW at 4/16/2023 1259    Acceptance, E,TB,H, VU by LW at 4/15/2023 1321    Comment: Educated patient on fall precautions and Home safety    Acceptance, E,TB, VU by CM at 4/14/2023 1411    Comment: OT POC, role of OT, d/c recommendations, NWB status   Family Acceptance, E,TB, VU by CM at 4/14/2023 1411    Comment: OT POC, role of OT, d/c recommendations, NWB status                   Point: Home exercise program (Done)     Description:   Instruct learner(s) on appropriate technique for monitoring, assisting and/or progressing therapeutic exercises/activities.              Learning Progress Summary           Patient Acceptance, E,TB,D, VU by CS at 4/17/2023 1208    Acceptance, E,TB, VU by LW at 4/16/2023 1259    Acceptance, E,TB,H, VU by LW at 4/15/2023 1321    Comment: Educated patient on fall precautions and Home safety                   Point: Precautions (Done)     Description:   Instruct learner(s) on prescribed precautions during self-care and functional transfers.              Learning Progress Summary           Patient Acceptance, E, VU,NR by  at 4/19/2023  1128    Acceptance, E,TB,D, VU by CS at 4/17/2023 1208    Acceptance, E,TB, VU by LW at 4/16/2023 1259    Acceptance, E,TB,H, VU by LW at 4/15/2023 1321    Comment: Educated patient on fall precautions and Home safety    Acceptance, E,TB, VU by CM at 4/14/2023 1411    Comment: OT POC, role of OT, d/c recommendations, NWB status   Family Acceptance, E,TB, VU by CM at 4/14/2023 1411    Comment: OT POC, role of OT, d/c recommendations, NWB status                   Point: Body mechanics (Done)     Description:   Instruct learner(s) on proper positioning and spine alignment during self-care, functional mobility activities and/or exercises.              Learning Progress Summary           Patient Acceptance, E,TB,D, VU by CS at 4/17/2023 1208    Acceptance, E,TB, VU by LW at 4/16/2023 1259    Acceptance, E,TB,H, VU by LW at 4/15/2023 1321    Comment: Educated patient on fall precautions and Home safety    Acceptance, E,TB, VU by CM at 4/14/2023 1411    Comment: OT POC, role of OT, d/c recommendations, NWB status   Family Acceptance, E,TB, VU by CM at 4/14/2023 1411    Comment: OT POC, role of OT, d/c recommendations, NWB status                               User Key     Initials Effective Dates Name Provider Type Discipline     06/16/21 -  Eugenie Ireland COTA Occupational Therapist Assistant OT    CS 06/16/21 -  Rosario Guzman COTA Occupational Therapist Assistant OT    LW 06/16/21 -  Oanh Pacheco COTA Occupational Therapist Assistant OT    CM 11/18/22 -  Sameera Kang OT Occupational Therapist OT              OT Recommendation and Plan  Therapy Frequency (OT): daily  Plan of Care Review  Plan of Care Reviewed With: patient  Progress: improving  Outcome Evaluation: nursing ok'd OT pt agreeable to ue ex and grooming act, cordoba for grooming in chair, ue ex as vicente alt arms w/ 2# hand wt, chair push down as vicente.  cont poc     Time Calculation:    Time Calculation- OT     Row Name 04/19/23 1129             Time  Calculation- OT    OT Start Time 0800  -RC      OT Stop Time 0855  -RC      OT Time Calculation (min) 55 min  -RC      Total Timed Code Minutes- OT 55 minute(s)  -RC      OT Received On 04/19/23  -RC         Timed Charges    52975 - OT Therapeutic Exercise Minutes 30  -RC      73926 - OT Self Care/Mgmt Minutes 25  -RC         Total Minutes    Timed Charges Total Minutes 55  -RC       Total Minutes 55  -RC            User Key  (r) = Recorded By, (t) = Taken By, (c) = Cosigned By    Initials Name Provider Type     Eugenie Ireland COTA Occupational Therapist Assistant              Therapy Charges for Today     Code Description Service Date Service Provider Modifiers Qty    45300745051 HC OT THER PROC EA 15 MIN 4/19/2023 Eugenie Ireland COTA GO 2    68800112450 HC OT SELF CARE/MGMT/TRAIN EA 15 MIN 4/19/2023 Eugenie Ireland COTA GO 2               ZIGGY Banegas  4/19/2023

## 2023-04-19 NOTE — PLAN OF CARE
Goal Outcome Evaluation:  Plan of Care Reviewed With: patient           Outcome Evaluation: Pt sat up in the chair until 0100. Rested and asked to get back up to the chair at 0500. Administered tylenol once for knee pain. VSS. No distress noted.

## 2023-04-19 NOTE — PLAN OF CARE
Goal Outcome Evaluation:  Plan of Care Reviewed With: patient        Progress: improving  Outcome Evaluation: nursing ok'd OT pt agreeable to ue ex and grooming act, cordoba for grooming in chair, ue ex as vicente alt arms w/ 2# hand wt, chair push down as vicente.  cont poc

## 2023-04-19 NOTE — PROGRESS NOTES
"  Pharmacokinetics by Pharmacy - Vancomycin    Emre Lisa is a 63 y.o. male  [Ht: 182.9 cm (72\"); Wt: (!) 168 kg (370 lb)] is being treated for  bone and/or joint infection , day 7 of therapy.    Estimated Creatinine Clearance: 126.3 mL/min (by C-G formula based on SCr of 0.99 mg/dL).   Creatinine   Date Value Ref Range Status   04/19/2023 0.99 0.76 - 1.27 mg/dL Final   04/18/2023 1.05 0.76 - 1.27 mg/dL Final   04/17/2023 0.90 0.76 - 1.27 mg/dL Final   11/08/2022 1.0 0.7 - 1.3 mg/dL Final      Lab Results   Component Value Date    WBC 5.15 04/19/2023    WBC 5.18 04/18/2023    WBC 5.99 04/15/2023     C-Reactive Protein   Date Value Ref Range Status   04/13/2023 1.26 (H) 0.00 - 0.50 mg/dL Final   01/26/2022 <0.30 0.00 - 0.50 mg/dL Final   01/07/2022 19.20 (H) 0.00 - 0.50 mg/dL Final     Lactate   Date Value Ref Range Status   03/11/2016 1.5 0.5 - 2.0 mmol/L Final       Temp Readings from Last 1 Encounters:   04/19/23 98.5 °F (36.9 °C) (Temporal)      Lab Results   Component Value Date    VANCOPEAK 29.40 04/17/2023    VANCOTROUGH 14.40 04/17/2023         Culture Results:  Microbiology Results (last 10 days)       ** No results found for the last 240 hours. **          Current Vancomycin Dose:  1750 mg IVPB every 12 hours, day 7 of therapy.     Patient is also receiving ampicillin/sulbactam.    Assessment/Plan:  Reviewed above labs and cultures.   No cultures.  Surgical tissue path- cellulitis  Vancomycin levels were in range 4/17/23.   WBC 5.15, in goal range. Cr is 0.99, stable.  Consult is through 5/25.   Will continue current dose.    Order levels when clinically indicated. Pharmacy will continue to monitor renal function and adjust dose accordingly.    Sowmya Verduzco, PharmD   04/19/23 12:19 CDT           "

## 2023-04-19 NOTE — PLAN OF CARE
Goal Outcome Evaluation:  Plan of Care Reviewed With: patient        Progress: improving  Outcome Evaluation: pt responded well to PT. pt requires mod A x2 for sit-stand. pt participated in gait training- 4 small steps min A x2 while maintaining NWB L LE. pt particiapted in LE ther ex and chair push-ups. no new goals met at this time. pt would continue to benefit from PT services.

## 2023-04-19 NOTE — PROGRESS NOTES
[facility]  INPATIENT WOUND & OSTOMY CARE    PROGRESS NOTE    Today's Date: 04/19/23    Patient Name: Emre Lisa  MRN: [id number]  CSN: [id number]  PCP: Jamaal Bravo MD  Referring Provider: Marco A Thompson DPM  Attending Provider: Marco A Thompson DPM  Length of Stay: 3    SUBJECTIVE   Chief Complaint: none today    HPI: Follow up today. Patient sitting up in chair doing well. LTACH pending    Visit Dx:    ICD-10-CM ICD-9-CM   1. Cellulitis of left foot  L03.116 682.7   2. Charcot's joint of left foot  M14.672 094.0     713.5   3. Type 2 diabetes mellitus with foot ulcer, unspecified whether long term insulin use  E11.621 250.80    L97.509 707.15   4. Impaired functional mobility, balance, gait, and endurance  Z74.09 V49.89   5. Impaired mobility and ADLs  Z74.09 V49.89    Z78.9        Hospital Problem List:     Cellulitis of left foot    Type 2 diabetes mellitus with skin complication    Charcot's joint of left foot      History:   Past Medical History:   Diagnosis Date   • Arthritis    • Atrial fibrillation    • Diabetes mellitus    • Diabetic foot ulcer associated with type 2 diabetes mellitus     left great toe   • Hallux malleus of left foot    • Hammer toe    • Hypertension    • MI (myocardial infarction)    • Neuropathy in diabetes    • Onychomycosis    • Presence of biventricular cardiac pacemaker    • Rheumatoid arthritis      Past Surgical History:   Procedure Laterality Date   • AMPUTATION DIGIT Right 3/5/2017    Procedure: RIGHT AMPUTATION TRANSMETATRASAL , RECESSION GASTROCNEMOUS;  Surgeon: Marco A Thompson DPM;  Location: [facility];  Service:    • CARDIAC DEFIBRILLATOR PLACEMENT  2010, 2016   • CARPAL TUNNEL RELEASE  2015    elbow and wrist left arm   • FOOT IRRIGATION, DEBRIDEMENT AND REPAIR Left 3/7/2018    Procedure: FOOT IRRIGATION, DEBRIDEMENT AND REPAIR;  Surgeon: Marco A Thompson DPM;  Location: [facility];  Service:    • FOOT IRRIGATION, DEBRIDEMENT AND REPAIR  Left 3/10/2018    Procedure: FOOT IRRIGATION, DEBRIDEMENT AND REPAIR;  Surgeon: Marco A Thompson DPM;  Location: [facility] OR;  Service: Podiatry   • FOOT WOUND CLOSURE Right 3/2/2017    Procedure: INCISION AND DRAINAGE, RIGHT FOOT;  Surgeon: Tung Rosenthal DPM;  Location: [facility] OR;  Service:    • HAMMER TOE REPAIR Left 2/15/2018    Procedure: HAMMERTOE CORRECTION LEFT SECOND, THIRD AND FOURTH TOES AND HALLUX INTERPHALANGEAL JOINT ARTHRODESIS LEFT FOOT (Micro Asnis, 4.0 & 5.0 Asnis)      (c-arm);  Surgeon: Marco A Thompson DPM;  Location: [facility] OR;  Service:    • HARDWARE REMOVAL Left 3/5/2018    Procedure: ANKLE/FOOT HARDWARE REMOVAL;  Surgeon: Marco A Thompson DPM;  Location: [facility] OR;  Service:    • INCISION AND DRAINAGE LEG Left 3/5/2018    Procedure: INCISION AND DRAINAGE LOWER EXTREMITY;  Surgeon: Marco A Thompson DPM;  Location: [facility] OR;  Service:    • PLANTAR FASCIA RELEASE Left 11/21/2019    Procedure: PLANTAR PLANING LEFT FOOT AND ALL OTHER INDICATED PROCEDURES;  Surgeon: Marco A Thompson DPM;  Location: [facility];  Service: Podiatry   • TOE AMPUTATION Right 2016   • TONSILECTOMY, ADENOIDECTOMY, BILATERAL MYRINGOTOMY AND TUBES      age 25     Social History     Socioeconomic History   • Marital status:    Tobacco Use   • Smoking status: Never   • Smokeless tobacco: Never   Vaping Use   • Vaping Use: Never used   Substance and Sexual Activity   • Alcohol use: Yes     Comment: social   • Drug use: No   • Sexual activity: Defer       Allergies:  Allergies   Allergen Reactions   • Januvia [Sitagliptin] Hives   • Lipitor [Atorvastatin] Other (See Comments)     Muscle Cramps...   • Shellfish Allergy Other (See Comments)   • Sulfa Antibiotics Rash       Medications:    Current Facility-Administered Medications:   •  acetaminophen (TYLENOL) tablet 650 mg, 650 mg, Oral, Q6H PRN, Marco A Thompson DPM, 650 mg at 04/19/23 0915  •  ampicillin-sulbactam 3 g/100 mL 0.9% NS IVPB, 3 g, Intravenous, Q6H, Donna  Marco A FISCHER DPM, Last Rate: 0 mL/hr at 04/19/23 0135, 3 g at 04/19/23 1337  •  ascorbic acid (VITAMIN C) tablet 1,000 mg, 1,000 mg, Oral, Nightly, Marco A Thompson, ANDRIAM, 1,000 mg at 04/18/23 2108  •  sennosides-docusate (PERICOLACE) 8.6-50 MG per tablet 2 tablet, 2 tablet, Oral, BID, 1 tablet at 04/18/23 0831 **AND** polyethylene glycol (MIRALAX) packet 17 g, 17 g, Oral, Daily PRN **AND** bisacodyl (DULCOLAX) EC tablet 5 mg, 5 mg, Oral, Daily PRN **AND** bisacodyl (DULCOLAX) suppository 10 mg, 10 mg, Rectal, Daily PRN, Marco A Thompson, DPM  •  calcium carbonate (TUMS) chewable tablet 500 mg (200 mg elemental), 2 tablet, Oral, TID PRN, Adis Castellon MD, 2 tablet at 04/18/23 1621  •  cetirizine (zyrTEC) tablet 10 mg, 10 mg, Oral, Daily, Marco A Thompson DPM, 10 mg at 04/19/23 0906  •  dextrose (D50W) (25 g/50 mL) IV injection 25 g, 25 g, Intravenous, Q15 Min PRN, Regis Lorenzo MD  •  dextrose (GLUTOSE) oral gel 15 g, 15 g, Oral, Q15 Min PRN, Regis Lorenzo MD  •  dronedarone (MULTAQ) tablet 400 mg, 400 mg, Oral, Nightly, Marco A Thompson, DPM, 400 mg at 04/18/23 2108  •  fluticasone (FLONASE) 50 MCG/ACT nasal spray 2 spray, 2 spray, Nasal, Daily PRN, Marco A Thompson DPM  •  glipizide (GLUCOTROL) tablet 10 mg, 10 mg, Oral, QAM AC, Marco A Thompson, DPM, 10 mg at 04/19/23 0802  •  glucagon (human recombinant) (GLUCAGEN DIAGNOSTIC) injection 1 mg, 1 mg, Intramuscular, Q15 Min PRN, Regis Lorenzo MD  •  heparin (porcine) 5000 UNIT/ML injection 5,000 Units, 5,000 Units, Subcutaneous, Q8H, Marco A Thompson DPM, 5,000 Units at 04/19/23 1337  •  HYDROmorphone (DILAUDID) injection 0.5 mg, 0.5 mg, Intravenous, Q2H PRN **AND** naloxone (NARCAN) injection 0.4 mg, 0.4 mg, Intravenous, Q5 Min PRN, Marco A Thompson DPM  •  losartan (COZAAR) tablet 25 mg, 25 mg, Oral, Daily, Marco A Thompson DPM, 25 mg at 04/19/23 0907  •  magnesium oxide (MAG-OX) tablet 400 mg, 400 mg, Oral, BID, Marco A Thompson DPM, 400 mg at 04/19/23 0906  •   metFORMIN (GLUCOPHAGE) tablet 1,000 mg, 1,000 mg, Oral, BID With Meals, Marco A Thompson, DPM, 1,000 mg at 04/19/23 0802  •  metoprolol succinate XL (TOPROL-XL) 24 hr tablet 25 mg, 25 mg, Oral, BID, Marco A Thompson, DPM, 25 mg at 04/19/23 0906  •  ondansetron (ZOFRAN) injection 4 mg, 4 mg, Intravenous, Q6H PRN, Marco A Thompson, DPM, 4 mg at 04/17/23 0903  •  oxyCODONE-acetaminophen (PERCOCET) 7.5-325 MG per tablet 1 tablet, 1 tablet, Oral, Q4H PRN, Marco A Thompson, DPM  •  Pharmacy to dose vancomycin, , Does not apply, Continuous PRN, Marco A Thompson, DPM  •  sodium chloride 0.9 % flush 10 mL, 10 mL, Intravenous, Q12H, Marco A Thompson, DPM, 10 mL at 04/18/23 2108  •  sodium chloride 0.9 % flush 10 mL, 10 mL, Intravenous, PRN, Marco A Thompson, DPM  •  sodium chloride 0.9 % infusion 40 mL, 40 mL, Intravenous, PRN, Marco A Thompson, DPM  •  sodium chloride 0.9 % infusion, 100 mL/hr, Intravenous, Continuous, Marco A Thompson, DPM, Last Rate: 100 mL/hr at 04/19/23 0609, 100 mL/hr at 04/19/23 0609  •  vancomycin 1750 mg/500 mL 0.9% NS IVPB ([facility]), 1,750 mg, Intravenous, Q12H, Marco A Thompson, DPM, Last Rate: 0 mL/hr at 04/16/23 1607, 1,750 mg at 04/19/23 1338  •  vitamin D3 tablet 5,000 Units, 5,000 Units, Oral, Nightly, Marco A Thompson, DPM, 5,000 Units at 04/18/23 2108        OBJECTIVE     Vitals:    04/19/23 1425   BP: 130/64   Pulse: 82   Resp: 18   Temp: 98.5 °F (36.9 °C)   SpO2: 98%       PHYSICAL EXAM  Physical Exam  Vitals reviewed. Nursing notes reviewed.  Constitutional:       Appearance: Well-developed.   Skin:     General: Skin is warm and dry.      Coloration: Skin is not pale.      Findings: No erythema or rash. Positive for wound. Woundvac in place.  Neurological:      Mental Status: Pt is alert and oriented to person, place, and time.   Psychiatric:         Behavior: Behavior normal.         Thought Content: Thought content normal.         Judgment: Judgment normal.       Results Review:  Lab Results (last 48  hours)     Procedure Component Value Units Date/Time    POC Glucose Once [[id number]]  (Abnormal) Collected: 04/19/23 1019    Specimen: Blood Updated: 04/19/23 1041     Glucose 143 mg/dL      Comment: RN NotifiedOperator: [id number] ROSENDO Royal ID: [id number]       POC Glucose Once [[id number]]  (Normal) Collected: 04/19/23 0658    Specimen: Blood Updated: 04/19/23 0747     Glucose 125 mg/dL      Comment: RN NotifiedOperator: [id number] ROSENDO BARRETTMeter ID: [id number]       Basic Metabolic Panel [[id number]]  (Abnormal) Collected: 04/19/23 0457    Specimen: Blood Updated: 04/19/23 0544     Glucose 100 mg/dL      BUN 18 mg/dL      Creatinine 0.99 mg/dL      Sodium 137 mmol/L      Potassium 4.1 mmol/L      Chloride 105 mmol/L      CO2 22.0 mmol/L      Calcium 8.5 mg/dL      BUN/Creatinine Ratio 18.2     Anion Gap 10.0 mmol/L      eGFR 86.7 mL/min/1.73     Narrative:      GFR Normal >60  Chronic Kidney Disease <60  Kidney Failure <15      CBC & Differential [[id number]]  (Abnormal) Collected: 04/19/23 0457    Specimen: Blood Updated: 04/19/23 0526    Narrative:      The following orders were created for panel order CBC & Differential.  Procedure                               Abnormality         Status                     ---------                               -----------         ------                     CBC Auto Differential[[id number]]        Abnormal            Final result                 Please view results for these tests on the individual orders.    CBC Auto Differential [[id number]]  (Abnormal) Collected: 04/19/23 0457    Specimen: Blood Updated: 04/19/23 0526     WBC 5.15 10*3/mm3      RBC 3.12 10*6/mm3      Hemoglobin 9.5 g/dL      Hematocrit 27.9 %      MCV 89.4 fL      MCH 30.4 pg      MCHC 34.1 g/dL      RDW 13.8 %      RDW-SD 44.6 fl      MPV 10.1 fL      Platelets 136 10*3/mm3      Neutrophil % 51.9 %      Lymphocyte % 27.8 %      Monocyte % 14.0 %      Eosinophil % 4.3 %      Basophil %  1.2 %      Immature Grans % 0.8 %      Neutrophils, Absolute 2.68 10*3/mm3      Lymphocytes, Absolute 1.43 10*3/mm3      Monocytes, Absolute 0.72 10*3/mm3      Eosinophils, Absolute 0.22 10*3/mm3      Basophils, Absolute 0.06 10*3/mm3      Immature Grans, Absolute 0.04 10*3/mm3      nRBC 0.0 /100 WBC     POC Glucose Once [[id number]]  (Normal) Collected: 23 1943    Specimen: Blood Updated: 23 2002     Glucose 129 mg/dL      Comment: RN NotifiedOperator: [id number] JAYASHREE MALLORYMeter ID: [id number]       POC Glucose Once [[id number]]  (Normal) Collected: 23 1619    Specimen: Blood Updated: 23 1644     Glucose 109 mg/dL      Comment: RN NotifiedOperator: [id number] [facility] HEATHERMeter ID: [id number]       Basic Metabolic Panel [[id number]]  (Abnormal) Collected: 23 1250    Specimen: Blood Updated: 23 1317     Glucose 103 mg/dL      BUN 20 mg/dL      Creatinine 1.05 mg/dL      Sodium 137 mmol/L      Potassium 4.3 mmol/L      Chloride 102 mmol/L      CO2 23.0 mmol/L      Calcium 9.1 mg/dL      BUN/Creatinine Ratio 19.0     Anion Gap 12.0 mmol/L      eGFR 80.8 mL/min/1.73     Narrative:      GFR Normal >60  Chronic Kidney Disease <60  Kidney Failure <15      TISSUE EXAM, [facility] ([facility]) [[id number]] Collected: 23 1652    Specimen: Bone from Leg, Left Updated: 23 1308     Reference Lab Report --     [facility]  [address] [place] [address]  Phone: [phone] or [phone]  Fax: [phone]  Stef Mclean M.D., Medical Director    PATIENT NAME                           LABORATORY NO.  [id number]  AMEE FONG.                  [id number]  [id number]                         AGE              SEX  SSN           CLIENT REF #  [facility]           63      1960  TORI    [id number]   [id number]    [place]                       REQUESTING M.DIANA.     ATTENDING M.D.     COPY TO[address]         "JAC[place]EL[place] [address]             DATE COLLECTED      DATE RECEIVED      DATE REPORTED  04/13/2023 04/14/2023 04/18/2023    DIAGNOSIS:  BONE, LEFT FIBULA:  Benign mature bone with remodeling and reactive changes at the distal articular  end; Negative for significant inflammation  Proximal end margin viable and unremarkable    [facility]    CLINICAL HISTORY:  Cellulitis of left foot, charcot's joint of left foot, type 2 diabetes mellitus with  foot ulcer, unspecified whether long term insulin use      SPECIMENS RECEIVED:  BONE , LEFT FIBULA    MICROSCOPIC DESCRIPTION:  Tissue blocks are prepared and slides are examined microscopically on all  specimens. See diagnosis for details.    Professional interpretation rendered by Dona Cruz D.O. at [email or web address], [address] [place] [address].    GROSS DESCRIPTION:  Specimen received in formalin labeled \"left fibula\" and consists of a 5.0 x 3.8 x  3.0 cm portion of bone having a tan partially irregular articular surface.  The  presumed proximal end has a smooth cut surface.  No areas of hemorrhage or  necrosis are grossly identified.  Representative sections are submitted in 3  cassettes after decalcification as follows: A1-presumed proximal margin A2-A3-  presumed distal end including irregular articular bone.  JTM/RLL    REVIEWED, DIAGNOSED AND ELECTRONICALLY  SIGNED BY:    Dona Cruz D.O.  CPT CODES:  13715, 79490      CBC & Differential [[id number]]  (Abnormal) Collected: 04/18/23 1251    Specimen: Blood Updated: 04/18/23 1306    Narrative:      The following orders were created for panel order CBC & Differential.  Procedure                               Abnormality         Status                     ---------                               -----------         ------                     CBC Auto Differential[[id number]]        Abnormal            Final result                 Please view " results for these tests on the individual orders.    CBC Auto Differential [[id number]]  (Abnormal) Collected: 04/18/23 1251    Specimen: Blood Updated: 04/18/23 1306     WBC 5.18 10*3/mm3      RBC 3.77 10*6/mm3      Hemoglobin 11.4 g/dL      Hematocrit 34.7 %      MCV 92.0 fL      MCH 30.2 pg      MCHC 32.9 g/dL      RDW 13.8 %      RDW-SD 46.2 fl      MPV 10.1 fL      Platelets 181 10*3/mm3      Neutrophil % 61.8 %      Lymphocyte % 20.5 %      Monocyte % 11.8 %      Eosinophil % 4.1 %      Basophil % 0.8 %      Immature Grans % 1.0 %      Neutrophils, Absolute 3.21 10*3/mm3      Lymphocytes, Absolute 1.06 10*3/mm3      Monocytes, Absolute 0.61 10*3/mm3      Eosinophils, Absolute 0.21 10*3/mm3      Basophils, Absolute 0.04 10*3/mm3      Immature Grans, Absolute 0.05 10*3/mm3      nRBC 0.0 /100 WBC     POC Glucose Once [[id number]]  (Normal) Collected: 04/18/23 1126    Specimen: Blood Updated: 04/18/23 1146     Glucose 122 mg/dL      Comment: RN NotifiedOperator: [id number] [employer] HEATHERMeter ID: [id number]       POC Glucose Once [[id number]]  (Abnormal) Collected: 04/18/23 0748    Specimen: Blood Updated: 04/18/23 0844     Glucose 143 mg/dL      Comment: RN NotifiedOperator: [id number] [employer] HEATHERMeter ID: [id number]       POC Glucose Once [[id number]]  (Normal) Collected: 04/17/23 1917    Specimen: Blood Updated: 04/17/23 1941     Glucose 108 mg/dL      Comment: RN NotifiedOperator: [id number] MARITZA ROSINDAMeter ID: [id number]       POC Glucose Once [[id number]]  (Normal) Collected: 04/17/23 1624    Specimen: Blood Updated: 04/17/23 1643     Glucose 99 mg/dL      Comment: RN NotifiedOperator: [id number] CHASE LE'ANNAMeter ID: [id number]           Imaging Results (Last 72 Hours)     ** No results found for the last 72 hours. **             ASSESSMENT/PLAN       Examination and evaluation of wound(s) was performed.    DIAGNOSIS:     Post operative surgical site with woundvac in place.      PLAN:      Woundvac changed. Patient tolerated procedure well. Will change again Friday. Call with questions.    Discussed findings and treatment plan including risks, benefits, and treatment options with patient in detail. Patient agreed with treatment plan.          This document has been electronically signed by WALTER Duncan on April 19, 2023 15:16 CDT

## 2023-04-19 NOTE — THERAPY TREATMENT NOTE
Acute Care - Physical Therapy Treatment Note  [facility]     Patient Name: Emre Lisa  : 1960  MRN: [id number]  Today's Date: 2023      Visit Dx:     ICD-10-CM ICD-9-CM   1. Cellulitis of left foot  L03.116 682.7   2. Charcot's joint of left foot  M14.672 094.0     713.5   3. Type 2 diabetes mellitus with foot ulcer, unspecified whether long term insulin use  E11.621 250.80    L97.509 707.15   4. Impaired functional mobility, balance, gait, and endurance  Z74.09 V49.89   5. Impaired mobility and ADLs  Z74.09 V49.89    Z78.9      Patient Active Problem List   Diagnosis   • Foot osteomyelitis, right   • Nodule of groin   • Type 2 diabetes mellitus with skin complication   • Status post transmetatarsal amputation of right foot   • Hammer toe of left foot   • Hallux malleus of left foot   • Cellulitis of left foot   • Infected hardware in left leg   • Chronic multifocal osteomyelitis of left foot   • Charcot's joint of left foot   • Skin ulcer of left foot, limited to breakdown of skin     Past Medical History:   Diagnosis Date   • Arthritis    • Atrial fibrillation    • Diabetes mellitus    • Diabetic foot ulcer associated with type 2 diabetes mellitus     left great toe   • Hallux malleus of left foot    • Hammer toe    • Hypertension    • MI (myocardial infarction)    • Neuropathy in diabetes    • Onychomycosis    • Presence of biventricular cardiac pacemaker    • Rheumatoid arthritis      Past Surgical History:   Procedure Laterality Date   • AMPUTATION DIGIT Right 3/5/2017    Procedure: RIGHT AMPUTATION TRANSMETATRASAL , RECESSION GASTROCNEMOUS;  Surgeon: Marco A Thompson DPM;  Location: [facility];  Service:    • CARDIAC DEFIBRILLATOR PLACEMENT  ,    • CARPAL TUNNEL RELEASE  2015    elbow and wrist left arm   • FOOT IRRIGATION, DEBRIDEMENT AND REPAIR Left 3/7/2018    Procedure: FOOT IRRIGATION, DEBRIDEMENT AND REPAIR;  Surgeon: Marco A Thompson DPM;  Location: [facility];  Service:     • FOOT IRRIGATION, DEBRIDEMENT AND REPAIR Left 3/10/2018    Procedure: FOOT IRRIGATION, DEBRIDEMENT AND REPAIR;  Surgeon: Marco A Thompson DPM;  Location: [facility] OR;  Service: Podiatry   • FOOT WOUND CLOSURE Right 3/2/2017    Procedure: INCISION AND DRAINAGE, RIGHT FOOT;  Surgeon: Tung Rosenthal DPM;  Location: [facility] OR;  Service:    • HAMMER TOE REPAIR Left 2/15/2018    Procedure: HAMMERTOE CORRECTION LEFT SECOND, THIRD AND FOURTH TOES AND HALLUX INTERPHALANGEAL JOINT ARTHRODESIS LEFT FOOT (Micro Asnis, 4.0 & 5.0 Asnis)      (c-arm);  Surgeon: Marco A Thompson DPM;  Location: [facility] OR;  Service:    • HARDWARE REMOVAL Left 3/5/2018    Procedure: ANKLE/FOOT HARDWARE REMOVAL;  Surgeon: Marco A Thompson DPM;  Location: [facility] OR;  Service:    • INCISION AND DRAINAGE LEG Left 3/5/2018    Procedure: INCISION AND DRAINAGE LOWER EXTREMITY;  Surgeon: Marco A Thompson DPM;  Location: [facility] OR;  Service:    • PLANTAR FASCIA RELEASE Left 11/21/2019    Procedure: PLANTAR PLANING LEFT FOOT AND ALL OTHER INDICATED PROCEDURES;  Surgeon: Marco A Thompson DPM;  Location: [facility];  Service: Podiatry   • TOE AMPUTATION Right 2016   • TONSILECTOMY, ADENOIDECTOMY, BILATERAL MYRINGOTOMY AND TUBES      age 25     PT Assessment (last 12 hours)     PT Evaluation and Treatment     Row Name 04/19/23 1500          Physical Therapy Time and Intention    Document Type therapy note (daily note)  -MOHSEN     Mode of Treatment individual therapy;physical therapy  -MOHSEN     Patient Effort good  -MOHSEN     Comment --  -MOHSEN     Row Name 04/19/23 1509          General Information    Patient Profile Reviewed yes  -MOHSEN     Existing Precautions/Restrictions fall;non-weight bearing;left  -     Row Name 04/19/23 1508          Pain    Pretreatment Pain Rating 5/10  -     Posttreatment Pain Rating 7/10  -     Pain Location - Side/Orientation Right  -     Pain Location - knee  -     Pain Intervention(s) Repositioned  -     Row Name 04/19/23 0056           Cognition    Affect/Mental Status (Cognition) WFL  -     Orientation Status (Cognition) oriented x 4  -MOHSEN     Personal Safety Interventions fall prevention program maintained;gait belt;muscle strengthening facilitated;nonskid shoes/slippers when out of bed;supervised activity  -     Row Name 04/19/23 1505          Mobility    Extremity Weight-bearing Status left lower extremity  -     Left Lower Extremity (Weight-bearing Status) non weight-bearing (NWB)  -     Row Name 04/19/23 1505          Transfers    Transfers sit-stand transfer;stand-sit transfer  -     Row Name 04/19/23 1505          Sit-Stand Transfer    Sit-Stand [place] (Transfers) moderate assist (50% patient effort);2 person assist  -     Assistive Device (Sit-Stand Transfers) walker, front-wheeled  -     Row Name 04/19/23 1505          Stand-Sit Transfer    Stand-Sit [place] (Transfers) moderate assist (50% patient effort);2 person assist  -     Assistive Device (Stand-Sit Transfers) walker, front-wheeled  -     Row Name 04/19/23 1505          Gait/Stairs (Locomotion)    Gait/Stairs Locomotion gait/ambulation independence;gait/ambulation assistive device;distance ambulated;gait pattern;maintains weight-bearing status;gait deviations;stairs negotiation  -     [place] Level (Gait) minimum assist (75% patient effort);2 person assist  -     Assistive Device (Gait) walker, front-wheeled  -     Distance in Feet (Gait) 4 small steps(hopping)  -     Pattern (Gait) swing-to  -     Deviations/Abnormal Patterns (Gait) stride length decreased;gait speed decreased  -     Row Name 04/19/23 1505          Safety Issues, Functional Mobility    Impairments Affecting Function (Mobility) strength;pain  -     Row Name 04/19/23 1505          Motor Skills    Therapeutic Exercise --  ankle DF/PF(wiggled toes on R), LAQ, hip flexion, hip Ab/Ad, GS- 20-30x1 estevan AROM. chair push-ups while NWB L LE- 25x1  -     Row Name              Wound 04/13/23 1908 Left anterior foot Incision    Wound - Properties Group Placement Date: 04/13/23  -LS Placement Time: 1908  -LS Present on Hospital Admission: N  -LS Side: Left  -LS Orientation: anterior  -LS Location: foot  -LS Primary Wound Type: Incision  -LS    Retired Wound - Properties Group Placement Date: 04/13/23  -LS Placement Time: 1908  -LS Present on Hospital Admission: N  -LS Side: Left  -LS Orientation: anterior  -LS Location: foot  -LS Primary Wound Type: Incision  -LS    Retired Wound - Properties Group Date first assessed: 04/13/23  -LS Time first assessed: 1908  -LS Present on Hospital Admission: N  -LS Side: Left  -LS Location: foot  -LS Primary Wound Type: Incision  -LS    Row Name             Wound 04/14/23 2157 Left medial gluteal    Wound - Properties Group Placement Date: 04/14/23  -ML Placement Time: 2157  -ML Present on Hospital Admission: Y  -ML, it was passed on in report pt came in with wound, pt stated wound was present on admission  Side: Left  -ML Orientation: medial  -ML Location: gluteal  -ML    Retired Wound - Properties Group Placement Date: 04/14/23  -ML Placement Time: 2157  -ML Present on Hospital Admission: Y  -ML, it was passed on in report pt came in with wound, pt stated wound was present on admission  Side: Left  -ML Orientation: medial  -ML Location: gluteal  -ML    Retired Wound - Properties Group Date first assessed: 04/14/23  -ML Time first assessed: 2157  -ML Present on Hospital Admission: Y  -ML, it was passed on in report pt came in with wound, pt stated wound was present on admission  Side: Left  -ML Location: gluteal  -ML    Row Name             NPWT (Negative Pressure Wound Therapy) 04/13/23 1130 left anterior ankle    NPWT (Negative Pressure Wound Therapy) - Properties Group Placement Date: 04/13/23  -TH Placement Time: 1130  -TH Location: left anterior ankle  -TH    Retired NPWT (Negative Pressure Wound Therapy) - Properties Group Placement Date:  04/13/23 -TH Placement Time: 1130 -TH Location: left anterior ankle  -TH    Retired NPWT (Negative Pressure Wound Therapy) - Properties Group Placement Date: 04/13/23 -TH Placement Time: 1130 -TH Location: left anterior ankle  -TH    Row Name 04/19/23 1505          Vital Signs    Pre Systolic BP Rehab 124  -MOHSEN     Pre Treatment Diastolic BP 70  -MOHSEN     Post Systolic BP Rehab 122  -MOHSEN     Post Treatment Diastolic BP 73  -MOHSEN     Pretreatment Heart Rate (beats/min) 84  -MOHSEN     Posttreatment Heart Rate (beats/min) 86  -MOHSEN     Pre SpO2 (%) 98  -MOHSEN     O2 Delivery Pre Treatment room air  -MOHSEN     Pre Patient Position Sitting  -MOHSEN     Post Patient Position Sitting  -MOHSEN     Row Name 04/19/23 1505          Positioning and Restraints    Pre-Treatment Position sitting in chair/recliner  -MOHSEN     Post Treatment Position chair  -MOHSEN     In Chair reclined;call light within reach;encouraged to call for assist  all needs met.  -     Row Name 04/19/23 1505          Bed Mobility Goal 1 (PT)    Activity/Assistive Device (Bed Mobility Goal 1, PT) bed mobility activities, all  -MOHSEN     [place] Level/Cues Needed (Bed Mobility Goal 1, PT) moderate assist (50-74% patient effort)  -MOHSEN     Time Frame (Bed Mobility Goal 1, PT) by discharge  -MOHSEN     Strategies/Barriers (Bed Mobility Goal 1, PT) while maintaining WB precautions  -MOHSEN     Progress/Outcomes (Bed Mobility Goal 1, PT) new goal;goal not met  -     Row Name 04/19/23 1505          Transfer Goal 1 (PT)    Activity/Assistive Device (Transfer Goal 1, PT) sit-to-stand/stand-to-sit;bed-to-chair/chair-to-bed  -MOHSEN     [place] Level/Cues Needed (Transfer Goal 1, PT) moderate assist (50-74% patient effort)  -MOHSEN     Time Frame (Transfer Goal 1, PT) by discharge  -MOHSEN     Strategies/Barriers (Transfers Goal 1, PT) While maintaining WB precautions  -MOHSEN     Progress/Outcome (Transfer Goal 1, PT) goal not met;good progress toward goal  -     Row Name 04/19/23 150          Problem  Specific Goal 1 (PT)    Problem Specific Goal 1 (PT) Improve functional LE strength on the R to be able to complete x5 STS w/modA to maxA  -     Time Frame (Problem Specific Goal 1, PT) by discharge  -     Progress/Outcome (Problem Specific Goal 1, PT) good progress toward goal;goal not met  -           User Key  (r) = Recorded By, (t) = Taken By, (c) = Cosigned By    Initials Name Provider Type     Jennifer Leung RN Registered Nurse    Bladimir Michelle PTA Physical Therapist Assistant    Kranthi Swartz RN Registered Nurse    Hortensia Rogel RN Registered Nurse                Physical Therapy Education     Title: PT OT SLP Therapies (In Progress)     Topic: Physical Therapy (In Progress)     Point: Mobility training (In Progress)     Learning Progress Summary           Patient Acceptance, E, NR by  at 4/19/2023 1603    Acceptance, E, VU by  at 4/14/2023 1242    Comment: POC and goals   Significant Other Acceptance, E, VU by  at 4/14/2023 1242    Comment: POC and goals                   Point: Home exercise program (Not Started)     Learner Progress:  Not documented in this visit.          Point: Body mechanics (Not Started)     Learner Progress:  Not documented in this visit.          Point: Precautions (Done)     Learning Progress Summary           Patient Acceptance, E, VU by  at 4/14/2023 1242    Comment: POC and goals   Significant Other Acceptance, E, VU by  at 4/14/2023 1242    Comment: POC and goals                               User Key     Initials Effective Dates Name Provider Type Discipline     06/16/21 -  Bladimir Romero PTA Physical Therapist Assistant PT     01/09/23 -  Yanet Vega PT Physical Therapist PT              PT Recommendation and Plan  Anticipated Discharge Disposition (PT): [facility] ([facility])  Plan of Care Reviewed With: patient  Progress: improving  Outcome Evaluation: pt responded well to PT. pt requires mod A x2 for  sit-stand. pt participated in gait training- 4 small steps min A x2 while maintaining NWB L LE. pt particiapted in LE ther ex and chair push-ups. no new goals met at this time. pt would continue to benefit from PT services.   Outcome Measures     Row Name 04/19/23 1505             How much help from another person do you currently need...    Turning from your back to your side while in flat bed without using bedrails? 2  -MOHSEN      Moving from lying on back to sitting on the side of a flat bed without bedrails? 2  -MOHSEN      Moving to and from a bed to a chair (including a wheelchair)? 2  -MOHSEN      Standing up from a chair using your arms (e.g., wheelchair, bedside chair)? 2  -MOHSEN      Climbing 3-5 steps with a railing? 1  -MOHSEN      To walk in hospital room? 2  -MOHSEN      AM-PAC 6 Clicks Score (PT) 11  -MOHSEN         Functional Assessment    Outcome Measure Options AM-PAC 6 Clicks Basic Mobility (PT)  -MOHSEN            User Key  (r) = Recorded By, (t) = Taken By, (c) = Cosigned By    Initials Name Provider Type    Bladimir Michelle PTA Physical Therapist Assistant                 Time Calculation:    PT Charges     Row Name 04/19/23 1605             Time Calculation    Start Time 1505  -      Stop Time 1543  -      Time Calculation (min) 38 min  -         Time Calculation- PT    Total Timed Code Minutes- PT 38 minute(s)  -         Timed Charges    48937 - PT Therapeutic Exercise Minutes 23  -      21168 - PT Therapeutic Activity Minutes 15  -MOHSEN         Total Minutes    Timed Charges Total Minutes 38  -MOHSEN       Total Minutes 38  -MOHSEN            User Key  (r) = Recorded By, (t) = Taken By, (c) = Cosigned By    Initials Name Provider Type    Bladimir Michelle PTA Physical Therapist Assistant              Therapy Charges for Today     Code Description Service Date Service Provider Modifiers Qty    41120779001 HC PT THER PROC EA 15 MIN 4/19/2023 Bladimir Romero PTA GP 2    [id number]  PT THERAPEUTIC ACT EA 15 MIN  4/19/2023 Bladimir Romero, PTA GP 1          PT G-Codes  Outcome Measure Options: AM-PAC 6 Clicks Basic Mobility (PT)  AM-PAC 6 Clicks Score (PT): 11  AM-PAC 6 Clicks Score (OT): 17    Bladimir Romero PTA  4/19/2023

## 2023-04-19 NOTE — PROGRESS NOTES
Podiatric Surgery Progress Note    Subjective     Post-Operative Day: 6 post-left reduction left ankle deformity with application of external fixator  Systemic or Specific Complaints: No Complaints    Objective     Vital signs in last 24 hours:  Temp:  [98 °F (36.7 °C)-99.3 °F (37.4 °C)] 98.5 °F (36.9 °C)  Heart Rate:  [64-83] 82  Resp:  [18] 18  BP: (110-130)/(58-68) 130/64    General: alert, appears stated age and cooperative   Neurovascular:  Sensation left lower extremity absent  Capillary refill: Normal   Wound:  dressing c/d/i. Drain intact with ~15 mL output   Range of Motion:  Absent secondary to frame application   DVT Exam: No evidence of DVT seen on physical exam.     Data Review  CBC:  Results from last 7 days   Lab Units 04/19/23  0457   WBC 10*3/mm3 5.15   RBC 10*6/mm3 3.12*   HEMOGLOBIN g/dL 9.5*   HEMATOCRIT % 27.9*   PLATELETS 10*3/mm3 136*       Assessment & Plan     Charcot left ankle    Patient doing well postoperatively. Drain pulled. Continue heparin for DVT prophylaxis.  Continue IV antibiotics.  Nonweightbearing left lower extremity. Disp to LTAC on Friday.  Call with questions.     LOS: 3 days     Marco A Thompson DPM    Date: 4/19/2023  Time: 15:58 CDT

## 2023-04-20 LAB
ANION GAP SERPL CALCULATED.3IONS-SCNC: 9 MMOL/L (ref 5–15)
BASOPHILS # BLD AUTO: 0.03 10*3/MM3 (ref 0–0.2)
BASOPHILS NFR BLD AUTO: 0.6 % (ref 0–1.5)
BUN SERPL-MCNC: 15 MG/DL (ref 8–23)
BUN/CREAT SERPL: 16 (ref 7–25)
CALCIUM SPEC-SCNC: 8.6 MG/DL (ref 8.6–10.5)
CHLORIDE SERPL-SCNC: 103 MMOL/L (ref 98–107)
CO2 SERPL-SCNC: 24 MMOL/L (ref 22–29)
CREAT SERPL-MCNC: 0.94 MG/DL (ref 0.76–1.27)
DEPRECATED RDW RBC AUTO: 45.5 FL (ref 37–54)
EGFRCR SERPLBLD CKD-EPI 2021: 92.2 ML/MIN/1.73
EOSINOPHIL # BLD AUTO: 0.17 10*3/MM3 (ref 0–0.4)
EOSINOPHIL NFR BLD AUTO: 3.5 % (ref 0.3–6.2)
ERYTHROCYTE [DISTWIDTH] IN BLOOD BY AUTOMATED COUNT: 13.7 % (ref 12.3–15.4)
GLUCOSE BLDC GLUCOMTR-MCNC: 133 MG/DL (ref 70–130)
GLUCOSE BLDC GLUCOMTR-MCNC: 143 MG/DL (ref 70–130)
GLUCOSE BLDC GLUCOMTR-MCNC: 93 MG/DL (ref 70–130)
GLUCOSE BLDC GLUCOMTR-MCNC: 96 MG/DL (ref 70–130)
GLUCOSE SERPL-MCNC: 114 MG/DL (ref 65–99)
HCT VFR BLD AUTO: 31.7 % (ref 37.5–51)
HGB BLD-MCNC: 10.3 G/DL (ref 13–17.7)
IMM GRANULOCYTES # BLD AUTO: 0.07 10*3/MM3 (ref 0–0.05)
IMM GRANULOCYTES NFR BLD AUTO: 1.4 % (ref 0–0.5)
LYMPHOCYTES # BLD AUTO: 1.27 10*3/MM3 (ref 0.7–3.1)
LYMPHOCYTES NFR BLD AUTO: 25.9 % (ref 19.6–45.3)
MCH RBC QN AUTO: 29.9 PG (ref 26.6–33)
MCHC RBC AUTO-ENTMCNC: 32.5 G/DL (ref 31.5–35.7)
MCV RBC AUTO: 91.9 FL (ref 79–97)
MONOCYTES # BLD AUTO: 0.55 10*3/MM3 (ref 0.1–0.9)
MONOCYTES NFR BLD AUTO: 11.2 % (ref 5–12)
NEUTROPHILS NFR BLD AUTO: 2.81 10*3/MM3 (ref 1.7–7)
NEUTROPHILS NFR BLD AUTO: 57.4 % (ref 42.7–76)
NRBC BLD AUTO-RTO: 0 /100 WBC (ref 0–0.2)
PLATELET # BLD AUTO: 155 10*3/MM3 (ref 140–450)
PMV BLD AUTO: 10.5 FL (ref 6–12)
POTASSIUM SERPL-SCNC: 4 MMOL/L (ref 3.5–5.2)
RBC # BLD AUTO: 3.45 10*6/MM3 (ref 4.14–5.8)
SODIUM SERPL-SCNC: 136 MMOL/L (ref 136–145)
WBC NRBC COR # BLD: 4.9 10*3/MM3 (ref 3.4–10.8)

## 2023-04-20 PROCEDURE — 97535 SELF CARE MNGMENT TRAINING: CPT

## 2023-04-20 PROCEDURE — 82962 GLUCOSE BLOOD TEST: CPT

## 2023-04-20 PROCEDURE — 97110 THERAPEUTIC EXERCISES: CPT

## 2023-04-20 PROCEDURE — 80048 BASIC METABOLIC PNL TOTAL CA: CPT | Performed by: PODIATRIST

## 2023-04-20 PROCEDURE — 25010000002 VANCOMYCIN 10 G RECONSTITUTED SOLUTION: Performed by: PODIATRIST

## 2023-04-20 PROCEDURE — 85025 COMPLETE CBC W/AUTO DIFF WBC: CPT | Performed by: PODIATRIST

## 2023-04-20 PROCEDURE — 25010000002 HEPARIN (PORCINE) PER 1000 UNITS: Performed by: PODIATRIST

## 2023-04-20 PROCEDURE — 25010000002 AMPICILLIN-SULBACTAM PER 1.5 G: Performed by: PODIATRIST

## 2023-04-20 RX ORDER — MUSCLE RUB CREAM 100; 150 MG/G; MG/G
1 CREAM TOPICAL
Status: DISCONTINUED | OUTPATIENT
Start: 2023-04-20 | End: 2023-04-21 | Stop reason: HOSPADM

## 2023-04-20 RX ADMIN — METFORMIN HYDROCHLORIDE 1000 MG: 500 TABLET, FILM COATED ORAL at 16:40

## 2023-04-20 RX ADMIN — Medication 400 MG: at 08:50

## 2023-04-20 RX ADMIN — METFORMIN HYDROCHLORIDE 1000 MG: 500 TABLET, FILM COATED ORAL at 08:50

## 2023-04-20 RX ADMIN — LOSARTAN POTASSIUM 25 MG: 25 TABLET, FILM COATED ORAL at 08:50

## 2023-04-20 RX ADMIN — HEPARIN SODIUM 5000 UNITS: 5000 INJECTION INTRAVENOUS; SUBCUTANEOUS at 21:03

## 2023-04-20 RX ADMIN — AMPICILLIN SODIUM AND SULBACTAM SODIUM 3 G: 2; 1 INJECTION, POWDER, FOR SOLUTION INTRAMUSCULAR; INTRAVENOUS at 11:36

## 2023-04-20 RX ADMIN — HEPARIN SODIUM 5000 UNITS: 5000 INJECTION INTRAVENOUS; SUBCUTANEOUS at 06:11

## 2023-04-20 RX ADMIN — HEPARIN SODIUM 5000 UNITS: 5000 INJECTION INTRAVENOUS; SUBCUTANEOUS at 14:40

## 2023-04-20 RX ADMIN — SODIUM CHLORIDE 100 ML/HR: 9 INJECTION, SOLUTION INTRAVENOUS at 13:13

## 2023-04-20 RX ADMIN — VANCOMYCIN HYDROCHLORIDE 1750 MG: 10 INJECTION, POWDER, LYOPHILIZED, FOR SOLUTION INTRAVENOUS at 13:13

## 2023-04-20 RX ADMIN — ACETAMINOPHEN 650 MG: 325 TABLET ORAL at 02:50

## 2023-04-20 RX ADMIN — CETIRIZINE HYDROCHLORIDE 10 MG: 10 TABLET, FILM COATED ORAL at 08:50

## 2023-04-20 RX ADMIN — DRONEDARONE 400 MG: 400 TABLET, FILM COATED ORAL at 21:04

## 2023-04-20 RX ADMIN — AMPICILLIN SODIUM AND SULBACTAM SODIUM 3 G: 2; 1 INJECTION, POWDER, FOR SOLUTION INTRAMUSCULAR; INTRAVENOUS at 16:39

## 2023-04-20 RX ADMIN — METOPROLOL SUCCINATE 25 MG: 25 TABLET, FILM COATED, EXTENDED RELEASE ORAL at 21:03

## 2023-04-20 RX ADMIN — AMPICILLIN SODIUM AND SULBACTAM SODIUM 3 G: 2; 1 INJECTION, POWDER, FOR SOLUTION INTRAMUSCULAR; INTRAVENOUS at 06:11

## 2023-04-20 RX ADMIN — VANCOMYCIN HYDROCHLORIDE 1750 MG: 10 INJECTION, POWDER, LYOPHILIZED, FOR SOLUTION INTRAVENOUS at 01:41

## 2023-04-20 RX ADMIN — OXYCODONE HYDROCHLORIDE AND ACETAMINOPHEN 1000 MG: 500 TABLET ORAL at 21:05

## 2023-04-20 RX ADMIN — Medication 10 ML: at 21:06

## 2023-04-20 RX ADMIN — METOPROLOL SUCCINATE 25 MG: 25 TABLET, FILM COATED, EXTENDED RELEASE ORAL at 08:50

## 2023-04-20 RX ADMIN — Medication 400 MG: at 21:04

## 2023-04-20 RX ADMIN — GLIPIZIDE 10 MG: 5 TABLET ORAL at 08:50

## 2023-04-20 RX ADMIN — ACETAMINOPHEN 650 MG: 325 TABLET ORAL at 21:04

## 2023-04-20 RX ADMIN — AMPICILLIN SODIUM AND SULBACTAM SODIUM 3 G: 2; 1 INJECTION, POWDER, FOR SOLUTION INTRAMUSCULAR; INTRAVENOUS at 01:06

## 2023-04-20 RX ADMIN — CHOLECALCIFEROL TAB 125 MCG (5000 UNIT) 5000 UNITS: 125 TAB at 21:02

## 2023-04-20 NOTE — PLAN OF CARE
Goal Outcome Evaluation:  Plan of Care Reviewed With: patient        Progress: improving  Outcome Evaluation: Pt tolerated tx well this date. Pt c/o R LE pain. Pt completed an full ADL. No goals met this tx. Continue OT POC.

## 2023-04-20 NOTE — PLAN OF CARE
Goal Outcome Evaluation:  Plan of Care Reviewed With: patient     Problem: Adult Inpatient Plan of Care  Goal: Plan of Care Review  Outcome: Ongoing, Progressing  Flowsheets (Taken 4/20/2023 1045)  Progress: improving  Plan of Care Reviewed With: patient  Outcome Evaluation: pt reports increased pain last night and this morning. pt declines standing/taking steps at this time. pt participated well in LE ther ex estevan and chair push-ups. AROM with estevan LE ther ex. no new goals met at this time. pt would continue to benefit from PT services.

## 2023-04-20 NOTE — PROGRESS NOTES
"Pharmacokinetics by Pharmacy - Vancomycin    Emre Lisa is a 63 y.o. male receiving vancomycin 1750 mg IV q12hr (day 8) for bone and/or joint infection.  Patient is also receiving ampicillin/sulbactam.    Objective:    Temp Readings from Last 1 Encounters:   04/20/23 98.4 °F (36.9 °C) (Temporal)     WBC   Date Value Ref Range Status   04/20/2023 4.90 3.40 - 10.80 10*3/mm3 Final   04/19/2023 5.15 3.40 - 10.80 10*3/mm3 Final   04/18/2023 5.18 3.40 - 10.80 10*3/mm3 Final    No results found for: CRP, LACTATE   Creatinine   Date Value Ref Range Status   04/20/2023 0.94 0.76 - 1.27 mg/dL Final   04/19/2023 0.99 0.76 - 1.27 mg/dL Final   04/18/2023 1.05 0.76 - 1.27 mg/dL Final       Vancomycin Trough   Date Value Ref Range Status   04/17/2023 14.40 5.00 - 20.00 mcg/mL Final       Culture Results:  Microbiology Results (last 10 days)     ** No results found for the last 240 hours. **        No results found for: RESPCX    [Ht: 182.9 cm (72\"); Wt: (!) 168 kg (370 lb)]   Body mass index is 50.18 kg/m².  Ideal body weight: 77.6 kg (171 lb 1.2 oz)  Adjusted ideal body weight: 114 kg (250 lb 10.3 oz)      Assessment:  • WBCs WNL on 4/15, afebrile  • Creatinine 0.94 mcg/mL and appears stable.   • No cultures  • Vancomycin levels WNL on 4/17.    • AUC and trough goals are 400-600 and 10-20 mcg/mL, respectively.     Plan:  1. Continue Vancomycin 1750 mg IV q12hrs   2. Vancomycin levels when clinically indicated. Consulted until 5/25.  3. Pharmacy will monitor renal function and adjust dose accordingly.    Thank you for this consult.     Estiven Cannon, [facility]   04/20/23 09:12 CDT    "

## 2023-04-20 NOTE — THERAPY TREATMENT NOTE
Acute Care - Physical Therapy Treatment Note  [facility]     Patient Name: Emre Lisa  : 1960  MRN: [id number]  Today's Date: 2023      Visit Dx:     ICD-10-CM ICD-9-CM   1. Cellulitis of left foot  L03.116 682.7   2. Charcot's joint of left foot  M14.672 094.0     713.5   3. Type 2 diabetes mellitus with foot ulcer, unspecified whether long term insulin use  E11.621 250.80    L97.509 707.15   4. Impaired functional mobility, balance, gait, and endurance  Z74.09 V49.89   5. Impaired mobility and ADLs  Z74.09 V49.89    Z78.9      Patient Active Problem List   Diagnosis   • Foot osteomyelitis, right   • Nodule of groin   • Type 2 diabetes mellitus with skin complication   • Status post transmetatarsal amputation of right foot   • Hammer toe of left foot   • Hallux malleus of left foot   • Cellulitis of left foot   • Infected hardware in left leg   • Chronic multifocal osteomyelitis of left foot   • Charcot's joint of left foot   • Skin ulcer of left foot, limited to breakdown of skin     Past Medical History:   Diagnosis Date   • Arthritis    • Atrial fibrillation    • Diabetes mellitus    • Diabetic foot ulcer associated with type 2 diabetes mellitus     left great toe   • Hallux malleus of left foot    • Hammer toe    • Hypertension    • MI (myocardial infarction)    • Neuropathy in diabetes    • Onychomycosis    • Presence of biventricular cardiac pacemaker    • Rheumatoid arthritis      Past Surgical History:   Procedure Laterality Date   • AMPUTATION DIGIT Right 3/5/2017    Procedure: RIGHT AMPUTATION TRANSMETATRASAL , RECESSION GASTROCNEMOUS;  Surgeon: Marco A Thompson DPM;  Location: [facility];  Service:    • CARDIAC DEFIBRILLATOR PLACEMENT  ,    • CARPAL TUNNEL RELEASE  2015    elbow and wrist left arm   • FOOT IRRIGATION, DEBRIDEMENT AND REPAIR Left 3/7/2018    Procedure: FOOT IRRIGATION, DEBRIDEMENT AND REPAIR;  Surgeon: Marco A Thompson DPM;  Location: [facility];  Service:     • FOOT IRRIGATION, DEBRIDEMENT AND REPAIR Left 3/10/2018    Procedure: FOOT IRRIGATION, DEBRIDEMENT AND REPAIR;  Surgeon: Marco A Thompson DPM;  Location: [facility] OR;  Service: Podiatry   • FOOT WOUND CLOSURE Right 3/2/2017    Procedure: INCISION AND DRAINAGE, RIGHT FOOT;  Surgeon: Tung Rosenthal DPM;  Location: [facility] OR;  Service:    • HAMMER TOE REPAIR Left 2/15/2018    Procedure: HAMMERTOE CORRECTION LEFT SECOND, THIRD AND FOURTH TOES AND HALLUX INTERPHALANGEAL JOINT ARTHRODESIS LEFT FOOT (Micro Asnis, 4.0 & 5.0 Asnis)      (c-arm);  Surgeon: Marco A Thompson DPM;  Location: [facility] OR;  Service:    • HARDWARE REMOVAL Left 3/5/2018    Procedure: ANKLE/FOOT HARDWARE REMOVAL;  Surgeon: Marco A Thompson DPM;  Location: [facility] OR;  Service:    • INCISION AND DRAINAGE LEG Left 3/5/2018    Procedure: INCISION AND DRAINAGE LOWER EXTREMITY;  Surgeon: Marco A Thompson DPM;  Location: [facility] OR;  Service:    • PLANTAR FASCIA RELEASE Left 11/21/2019    Procedure: PLANTAR PLANING LEFT FOOT AND ALL OTHER INDICATED PROCEDURES;  Surgeon: Marco A Thompson DPM;  Location: [facility];  Service: Podiatry   • TOE AMPUTATION Right 2016   • TONSILECTOMY, ADENOIDECTOMY, BILATERAL MYRINGOTOMY AND TUBES      age 25     PT Assessment (last 12 hours)     PT Evaluation and Treatment     Row Name 04/20/23 1030          Physical Therapy Time and Intention    Document Type therapy note (daily note)  -MOHSEN     Mode of Treatment individual therapy;physical therapy  -MOHSEN     Patient Effort good  -MOHSEN     Comment pt reports increased pain over night and this morning. pt defers standing and taking steps at this time. pt agreeable to LE ther ex.  -     Row Name 04/20/23 1030          General Information    Patient Profile Reviewed yes  -MOHSEN     Existing Precautions/Restrictions fall;non-weight bearing;left  -     Row Name 04/20/23 1030          Pain    Pretreatment Pain Rating 6/10  -MOHSEN     Posttreatment Pain Rating 6/10  -MOHSEN     Pain Location -  Side/Orientation Right  -     Pain Location - knee  -     Pain Intervention(s) Repositioned  -     Row Name 04/20/23 1030          Cognition    Affect/Mental Status (Cognition) WFL  -     Orientation Status (Cognition) oriented x 4  -     Personal Safety Interventions fall prevention program maintained;gait belt;muscle strengthening facilitated;nonskid shoes/slippers when out of bed;supervised activity  -     Row Name 04/20/23 1030          Mobility    Extremity Weight-bearing Status left lower extremity  -     Left Lower Extremity (Weight-bearing Status) non weight-bearing (NWB)  -     Row Name 04/20/23 1030          Transfers    Transfers sit-stand transfer;stand-sit transfer  -     Row Name 04/20/23 1030          Sit-Stand Transfer    Sit-Stand Drew (Transfers) moderate assist (50% patient effort);2 person assist  -     Assistive Device (Sit-Stand Transfers) walker, front-wheeled  -     Row Name 04/20/23 1030          Stand-Sit Transfer    Stand-Sit Drew (Transfers) moderate assist (50% patient effort);2 person assist  -     Assistive Device (Stand-Sit Transfers) walker, front-wheeled  -     Row Name 04/20/23 1030          Gait/Stairs (Locomotion)    Gait/Stairs Locomotion gait/ambulation independence;gait/ambulation assistive device;distance ambulated;gait pattern;maintains weight-bearing status;gait deviations;stairs negotiation  -     Drew Level (Gait) minimum assist (75% patient effort);2 person assist  -     Assistive Device (Gait) walker, front-wheeled  [facility]     Pattern (Gait) swing-to  -     Deviations/Abnormal Patterns (Gait) stride length decreased;gait speed decreased  -     Row Name 04/20/23 1030          Safety Issues, Functional Mobility    Impairments Affecting Function (Mobility) strength;pain  -[facility] Name 04/20/23 1030          Motor Skills    Therapeutic Exercise --  ankle DF/PF(wiggled toes on L), LAQ, hip flexion, hip Ab/Ad, GS-  15-20x2 estevan AROM. chair push-ups.  -MOHSEN     Row Name             Wound 04/13/23 1908 Left anterior foot Incision    Wound - Properties Group Placement Date: 04/13/23  -LS Placement Time: 1908  -LS Present on Hospital Admission: N  -LS Side: Left  -LS Orientation: anterior  -LS Location: foot  -LS Primary Wound Type: Incision  -LS    Retired Wound - Properties Group Placement Date: 04/13/23  -LS Placement Time: 1908  -LS Present on Hospital Admission: N  -LS Side: Left  -LS Orientation: anterior  -LS Location: foot  -LS Primary Wound Type: Incision  -LS    Retired Wound - Properties Group Date first assessed: 04/13/23  -LS Time first assessed: 1908  -LS Present on Hospital Admission: N  -LS Side: Left  -LS Location: foot  -LS Primary Wound Type: Incision  -LS    Row Name             Wound 04/14/23 2157 Left medial gluteal    Wound - Properties Group Placement Date: 04/14/23  -ML Placement Time: 2157  -ML Present on Hospital Admission: Y  -ML, it was passed on in report pt came in with wound, pt stated wound was present on admission  Side: Left  -ML Orientation: medial  -ML Location: gluteal  -ML    Retired Wound - Properties Group Placement Date: 04/14/23  -ML Placement Time: 2157  -ML Present on Hospital Admission: Y  -ML, it was passed on in report pt came in with wound, pt stated wound was present on admission  Side: Left  -ML Orientation: medial  -ML Location: gluteal  -ML    Retired Wound - Properties Group Date first assessed: 04/14/23  -ML Time first assessed: 2157  -ML Present on Hospital Admission: Y  -ML, it was passed on in report pt came in with wound, pt stated wound was present on admission  Side: Left  -ML Location: gluteal  -ML    Row Name             NPWT (Negative Pressure Wound Therapy) 04/13/23 1130 left anterior ankle    NPWT (Negative Pressure Wound Therapy) - Properties Group Placement Date: 04/13/23  -TH Placement Time: 1130  -TH Location: left anterior ankle  -TH    Retired NPWT (Negative  Pressure Wound Therapy) - Properties Group Placement Date: 04/13/23 -TH Placement Time: 1130 -TH Location: left anterior ankle  -TH    Retired NPWT (Negative Pressure Wound Therapy) - Properties Group Placement Date: 04/13/23 -TH Placement Time: 1130 -TH Location: left anterior ankle  -TH    Row Name 04/20/23 1030          Vital Signs    Pre Systolic BP Rehab 111  -MOHSEN     Pre Treatment Diastolic BP 67  -MOHSEN     Post Systolic BP Rehab 122  -MOHSEN     Post Treatment Diastolic BP 70  -MOHSEN     Pretreatment Heart Rate (beats/min) 80  -MOHSEN     Posttreatment Heart Rate (beats/min) 77  -MOHSEN     Pre SpO2 (%) 97  -MOHSEN     O2 Delivery Pre Treatment room air  -MOHSEN     Post SpO2 (%) 97  -MOHSEN     O2 Delivery Post Treatment room air  -MOHSEN     Pre Patient Position Sitting  -MOHSEN     Post Patient Position Sitting  -MOHSEN     Row Name 04/20/23 1030          Positioning and Restraints    Pre-Treatment Position sitting in chair/recliner  -MOHSEN     Post Treatment Position chair  -MOHSEN     In Chair reclined;call light within reach;encouraged to call for assist;exit alarm on  all needs met.  -     Row Name 04/20/23 1030          Bed Mobility Goal 1 (PT)    Activity/Assistive Device (Bed Mobility Goal 1, PT) bed mobility activities, all  -MOHSEN     [place] Level/Cues Needed (Bed Mobility Goal 1, PT) moderate assist (50-74% patient effort)  -MOHSEN     Time Frame (Bed Mobility Goal 1, PT) by discharge  -MOHSEN     Strategies/Barriers (Bed Mobility Goal 1, PT) while maintaining WB precautions  -MOHSEN     Progress/Outcomes (Bed Mobility Goal 1, PT) new goal;goal not met  -     Row Name 04/20/23 1030          Transfer Goal 1 (PT)    Activity/Assistive Device (Transfer Goal 1, PT) sit-to-stand/stand-to-sit;bed-to-chair/chair-to-bed  -MOHSEN     [place] Level/Cues Needed (Transfer Goal 1, PT) moderate assist (50-74% patient effort)  -MOHSEN     Time Frame (Transfer Goal 1, PT) by discharge  -MOHSEN     Strategies/Barriers (Transfers Goal 1, PT) While maintaining WB  precautions  -     Progress/Outcome (Transfer Goal 1, PT) goal not met;good progress toward goal  -     Row Name 04/20/23 1030          Problem Specific Goal 1 (PT)    Problem Specific Goal 1 (PT) Improve functional LE strength on the R to be able to complete x5 STS w/modA to maxA  -     Time Frame (Problem Specific Goal 1, PT) by discharge  -     Progress/Outcome (Problem Specific Goal 1, PT) good progress toward goal;goal not met  -           User Key  (r) = Recorded By, (t) = Taken By, (c) = Cosigned By    Initials Name Provider Type     Jennifer Leung RN Registered Nurse    Bladimir Michelle PTA Physical Therapist Assistant    Kranthi Swartz, RN Registered Nurse    Hortensia Rogel RN Registered Nurse                Physical Therapy Education     Title: PT OT SLP Therapies (In Progress)     Topic: Physical Therapy (In Progress)     Point: Mobility training (In Progress)     Learning Progress Summary           Patient Acceptance, E, NR by  at 4/20/2023 1044    Acceptance, E, NR by  at 4/19/2023 1603    Acceptance, E, VU by  at 4/14/2023 1242    Comment: POC and goals   Significant Other Acceptance, E, VU by  at 4/14/2023 1242    Comment: POC and goals                   Point: Home exercise program (Not Started)     Learner Progress:  Not documented in this visit.          Point: Body mechanics (Not Started)     Learner Progress:  Not documented in this visit.          Point: Precautions (Done)     Learning Progress Summary           Patient Acceptance, E, VU by  at 4/14/2023 1242    Comment: POC and goals   Significant Other Acceptance, E, VU by  at 4/14/2023 1242    Comment: POC and goals                               User Key     Initials Effective Dates Name Provider Type Discipline     06/16/21 -  Bladimir Romero PTA Physical Therapist Assistant PT     01/09/23 -  Yanet Vega PT Physical Therapist PT              PT Recommendation and Plan  Anticipated  Discharge Disposition (PT): LT ([facility])  Plan of Care Reviewed With: patient  Progress: improving  Outcome Evaluation: pt reports increased pain last night and this morning. pt declines standing/taking steps at this time. pt participated well in LE ther ex estevan and chair push-ups. AROM with estevan LE ther ex. no new goals met at this time. pt would continue to benefit from PT services.   Outcome Measures     Row Name 04/20/23 1030 04/19/23 1505          How much help from another person do you currently need...    Turning from your back to your side while in flat bed without using bedrails? 3  -MOHSEN 2  -MOHSEN     Moving from lying on back to sitting on the side of a flat bed without bedrails? 3  -MOHSEN 2  -MOHSEN     Moving to and from a bed to a chair (including a wheelchair)? 2  -MOHSEN 2  -MOHSEN     Standing up from a chair using your arms (e.g., wheelchair, bedside chair)? 2  -MOHSEN 2  -MOHSEN     Climbing 3-5 steps with a railing? 1  -MOHSEN 1  -MOHSEN     To walk in hospital room? 2  -MOHSEN 2  -MOHSEN     AM-PAC 6 Clicks Score (PT) 13  -MOHSEN 11  -MOHSEN        Functional Assessment    Outcome Measure Options AM-PAC 6 Clicks Basic Mobility (PT)  -MOHSEN AM-PAC 6 Clicks Basic Mobility (PT)  -MOHSEN           User Key  (r) = Recorded By, (t) = Taken By, (c) = Cosigned By    Initials Name Provider Type    Bladimir Michelle, PTA Physical Therapist Assistant                 Time Calculation:    PT Charges     Row Name 04/20/23 1054             Time Calculation    Start Time 1030  -      Stop Time 1054  -MOHSEN      Time Calculation (min) 24 min  -MOHSEN         Time Calculation- PT    Total Timed Code Minutes- PT 24 minute(s)  -MOHSEN         Timed Charges    28341 - PT Therapeutic Exercise Minutes 24  -MOHSEN         Total Minutes    Timed Charges Total Minutes 24  -MOHSEN       Total Minutes 24  -MOHSEN            User Key  (r) = Recorded By, (t) = Taken By, (c) = Cosigned By    Initials Name Provider Type    Bladimir Michelle, PTA Physical Therapist Assistant               Therapy Charges for Today     Code Description Service Date Service Provider Modifiers Qty    71739313909 HC PT THER PROC EA 15 MIN 4/19/2023 Bladimir Romero, PTA GP 2    50420671758 HC PT THERAPEUTIC ACT EA 15 MIN 4/19/2023 Bladimir Romero, PTA GP 1    05566211502 HC PT THER PROC EA 15 MIN 4/20/2023 Bladimir Romero, PTA GP 2          PT G-Codes  Outcome Measure Options: AM-PAC 6 Clicks Basic Mobility (PT)  AM-PAC 6 Clicks Score (PT): 13  AM-PAC 6 Clicks Score (OT): 17    Bladimir Romero PTA  4/20/2023

## 2023-04-20 NOTE — PLAN OF CARE
Goal Outcome Evaluation:  Plan of Care Reviewed With: patient           Outcome Evaluation: VSS. Administered PO tylenol once through the night for right leg discomfort. Pt sat in the chair until 0100, rested in bed from [id number], then pivoted back to the chair with x3 assist, gait belt, and walker. No distress noted.

## 2023-04-20 NOTE — THERAPY TREATMENT NOTE
Patient Name: Emre Lisa  : 1960    MRN: [id number]                              Today's Date: 2023       Admit Date: 2023    Visit Dx:     ICD-10-CM ICD-9-CM   1. Cellulitis of left foot  L03.116 682.7   2. Charcot's joint of left foot  M14.672 094.0     713.5   3. Type 2 diabetes mellitus with foot ulcer, unspecified whether long term insulin use  E11.621 250.80    L97.509 707.15   4. Impaired functional mobility, balance, gait, and endurance  Z74.09 V49.89   5. Impaired mobility and ADLs  Z74.09 V49.89    Z78.9      Patient Active Problem List   Diagnosis   • Foot osteomyelitis, right   • Nodule of groin   • Type 2 diabetes mellitus with skin complication   • Status post transmetatarsal amputation of right foot   • Hammer toe of left foot   • Hallux malleus of left foot   • Cellulitis of left foot   • Infected hardware in left leg   • Chronic multifocal osteomyelitis of left foot   • Charcot's joint of left foot   • Skin ulcer of left foot, limited to breakdown of skin     Past Medical History:   Diagnosis Date   • Arthritis    • Atrial fibrillation    • Diabetes mellitus    • Diabetic foot ulcer associated with type 2 diabetes mellitus     left great toe   • Hallux malleus of left foot    • Hammer toe    • Hypertension    • MI (myocardial infarction)    • Neuropathy in diabetes    • Onychomycosis    • Presence of biventricular cardiac pacemaker    • Rheumatoid arthritis      Past Surgical History:   Procedure Laterality Date   • AMPUTATION DIGIT Right 3/5/2017    Procedure: RIGHT AMPUTATION TRANSMETATRASAL , RECESSION GASTROCNEMOUS;  Surgeon: Marco A Thompson DPM;  Location: [facility];  Service:    • CARDIAC DEFIBRILLATOR PLACEMENT  ,    • CARPAL TUNNEL RELEASE  2015    elbow and wrist left arm   • FOOT IRRIGATION, DEBRIDEMENT AND REPAIR Left 3/7/2018    Procedure: FOOT IRRIGATION, DEBRIDEMENT AND REPAIR;  Surgeon: Marco A Thompson DPM;  Location: [facility];  Service:    • FOOT  IRRIGATION, DEBRIDEMENT AND REPAIR Left 3/10/2018    Procedure: FOOT IRRIGATION, DEBRIDEMENT AND REPAIR;  Surgeon: Marco A Thompson DPM;  Location: [facility] OR;  Service: Podiatry   • FOOT WOUND CLOSURE Right 3/2/2017    Procedure: INCISION AND DRAINAGE, RIGHT FOOT;  Surgeon: Tung Rosenthal DPM;  Location: [facility] OR;  Service:    • HAMMER TOE REPAIR Left 2/15/2018    Procedure: HAMMERTOE CORRECTION LEFT SECOND, THIRD AND FOURTH TOES AND HALLUX INTERPHALANGEAL JOINT ARTHRODESIS LEFT FOOT (Micro Asnis, 4.0 & 5.0 Asnis)      (c-arm);  Surgeon: Marco A Thompson DPM;  Location: [facility] OR;  Service:    • HARDWARE REMOVAL Left 3/5/2018    Procedure: ANKLE/FOOT HARDWARE REMOVAL;  Surgeon: Marco A Thompson DPM;  Location: [facility] OR;  Service:    • INCISION AND DRAINAGE LEG Left 3/5/2018    Procedure: INCISION AND DRAINAGE LOWER EXTREMITY;  Surgeon: Marco A Thompson DPM;  Location: [facility] OR;  Service:    • PLANTAR FASCIA RELEASE Left 11/21/2019    Procedure: PLANTAR PLANING LEFT FOOT AND ALL OTHER INDICATED PROCEDURES;  Surgeon: Marco A Thompson DPM;  Location: [facility] OR;  Service: Podiatry   • TOE AMPUTATION Right 2016   • TONSILECTOMY, ADENOIDECTOMY, BILATERAL MYRINGOTOMY AND TUBES      age 25      General Information     Row Name 04/20/23 0830          OT Time and Intention    Document Type therapy note (daily note)  -CS     Mode of Treatment occupational therapy  -CS     Row Name 04/20/23 0830          General Information    Patient Profile Reviewed yes  -CS     Existing Precautions/Restrictions fall;non-weight bearing;left  -CS     Row Name 04/20/23 0830          Cognition    Orientation Status (Cognition) oriented x 4  -CS     Row Name 04/20/23 0830          Safety Issues, Functional Mobility    Impairments Affecting Function (Mobility) strength;pain  -CS           User Key  (r) = Recorded By, (t) = Taken By, (c) = Cosigned By    Initials Name Provider Type    CS Rosario Guzman COTA Occupational Therapist Assistant                  Mobility/ADL's     Row Name 04/20/23 0830          Activities of Daily Living    BADL Assessment/Intervention bathing;upper body dressing;lower body dressing;grooming;toileting  -CS     Row Name 04/20/23 0830          Mobility    Extremity Weight-bearing Status left lower extremity  -CS     Left Lower Extremity (Weight-bearing Status) non weight-bearing (NWB)  -CS     Row Name 04/20/23 0830          Bathing Assessment/Intervention    [place] Level (Bathing) bathing skills;lower body;upper body;standby assist;distal lower extremities/feet;maximum assist (25% patient effort)  -CS     Position (Bathing) supported sitting  -CS     Row Name 04/20/23 0830          Upper Body Dressing Assessment/Training    [place] Level (Upper Body Dressing) upper body dressing skills;doff;don;standby assist  -CS     Position (Upper Body Dressing) supported sitting  -CS     Row Name 04/20/23 0830          Lower Body Dressing Assessment/Training    [place] Level (Lower Body Dressing) doff;don;socks;dependent (less than 25% patient effort)  -CS     Position (Lower Body Dressing) supported sitting  -CS     Row Name 04/20/23 0830          Grooming Assessment/Training    [place] Level (Grooming) grooming skills;hair care, combing/brushing;oral care regimen;wash face, hands;other (see comments)  apply deodorant/lotion; shampoo hair  -CS     Row Name 04/20/23 0830          Toileting Assessment/Training    [place] Level (Toileting) toileting skills;set up  -CS     Assistive Devices (Toileting) urinal  -CS     Position (Toileting) supported sitting  -CS           User Key  (r) = Recorded By, (t) = Taken By, (c) = Cosigned By    Initials Name Provider Type    CS Rosario Guzman COTA Occupational Therapist Assistant               Obj/Interventions    No documentation.                Goals/Plan     Row Name 04/20/23 0830          Transfer Goal 1 (OT)    Activity/Assistive Device (Transfer Goal 1, OT)  sit-to-stand/stand-to-sit;bed-to-chair/chair-to-bed;commode, bedside with drop arms  -CS     [place] Level/Cues Needed (Transfer Goal 1, OT) moderate assist (50-74% patient effort)  -CS     Time Frame (Transfer Goal 1, OT) long term goal (LTG);by discharge  -CS     Progress/Outcome (Transfer Goal 1, OT) goal not met  -CS     Row Name 04/20/23 0830          Bathing Goal 1 (OT)    Activity/Device (Bathing Goal 1, OT) lower body bathing  -CS     [place] Level/Cues Needed (Bathing Goal 1, OT) moderate assist (50-74% patient effort)  -CS     Time Frame (Bathing Goal 1, OT) long term goal (LTG);by discharge  -CS     Progress/Outcomes (Bathing Goal 1, OT) goal not met  -CS     Row Name 04/20/23 0830          Dressing Goal 1 (OT)    Activity/Device (Dressing Goal 1, OT) upper body dressing  -CS     [place]/Cues Needed (Dressing Goal 1, OT) set-up required  -CS     Time Frame (Dressing Goal 1, OT) long term goal (LTG);by discharge  -CS     Progress/Outcome (Dressing Goal 1, OT) goal not met;good progress toward goal  -CS     Row Name 04/20/23 0830          Toileting Goal 1 (OT)    Activity/Device (Toileting Goal 1, OT) toileting skills, all;adjust/manage clothing;perform perineal hygiene;commode, 3-in-1  -CS     [place] Level/Cues Needed (Toileting Goal 1, OT) minimum assist (75% or more patient effort)  -CS     Time Frame (Toileting Goal 1, OT) long term goal (LTG);by discharge  -CS     Progress/Outcome (Toileting Goal 1, OT) goal not met  -CS           User Key  (r) = Recorded By, (t) = Taken By, (c) = Cosigned By    Initials Name Provider Type    CS Rosario Guzman COTA Occupational Therapist Assistant               Clinical Impression     Row Name 04/20/23 0830          Pain Assessment    Pretreatment Pain Rating 7/10  -CS     Posttreatment Pain Rating 7/10  -CS     Pain Location - Side/Orientation Left  -CS     Pain Location lower  -CS     Pain Location - extremity  -CS     Pain Intervention(s)  Medication (See MAR);Rest;Distraction  -CS     Row Name 04/20/23 0830          Plan of Care Review    Plan of Care Reviewed With patient  -CS     Progress improving  -CS     Outcome Evaluation Pt tolerated tx well this date. Pt c/o R LE pain. Pt completed an full ADL. No goals met this tx. Continue OT POC.  -CS     Row Name 04/20/23 0830          Therapy Assessment/Plan (OT)    Rehab Potential (OT) good, to achieve stated therapy goals  -CS     Criteria for Skilled Therapeutic Interventions Met (OT) yes;meets criteria  -CS     Therapy Frequency (OT) daily  -CS     Row Name 04/20/23 0830          Therapy Plan Review/Discharge Plan (OT)    Anticipated Discharge Disposition (OT) inpatient rehabilitation facility;[facility] ([facility])  -CS     Row Name 04/20/23 0830          Vital Signs    Pre Patient Position Sitting  -CS     Post Patient Position Sitting  -CS     Row Name 04/20/23 0830          Positioning and Restraints    Pre-Treatment Position in bed  -CS     Post Treatment Position bed  -CS     In Chair sitting;call light within reach;encouraged to call for assist;exit alarm on  -CS           User Key  (r) = Recorded By, (t) = Taken By, (c) = Cosigned By    Initials Name Provider Type    CS Rosario Guzman COTA Occupational Therapist Assistant               Outcome Measures     Row Name 04/20/23 0830          How much help from another is currently needed...    Putting on and taking off regular lower body clothing? 1  -CS     Bathing (including washing, rinsing, and drying) 3  -CS     Toileting (which includes using toilet bed pan or urinal) 2  -CS     Putting on and taking off regular upper body clothing 3  -CS     Taking care of personal grooming (such as brushing teeth) 4  -CS     Eating meals 4  -CS     AM-PAC 6 Clicks Score (OT) 17  -CS     Row Name 04/20/23 1030 04/20/23 0900       How much help from another person do you currently need...    Turning from your back to your side while in flat bed  without using bedrails? 3  -MOHSEN 3  -LR    Moving from lying on back to sitting on the side of a flat bed without bedrails? 3  -MOHSEN 3  -LR    Moving to and from a bed to a chair (including a wheelchair)? 2  -MOHSEN 2  -LR    Standing up from a chair using your arms (e.g., wheelchair, bedside chair)? 2  -MOHSEN 2  -LR    Climbing 3-5 steps with a railing? 1  -MOHSEN 1  -LR    To walk in hospital room? 2  -MOHSEN 2  -LR    AM-PAC 6 Clicks Score (PT) 13  -MOHSEN 13  -LR    Highest level of mobility 4 --> Transferred to chair/commode  -MOHSEN 4 --> Transferred to chair/commode  -LR    Row Name 04/20/23 1030          Functional Assessment    Outcome Measure Options AM-PAC 6 Clicks Basic Mobility (PT)  -MOHSEN           User Key  (r) = Recorded By, (t) = Taken By, (c) = Cosigned By    Initials Name Provider Type    LR Dayanna Rogel, RN Registered Nurse    Bladimir Michelle PTA Physical Therapist Assistant    Rosario Frye COTA Occupational Therapist Assistant                Occupational Therapy Education     Title: PT OT SLP Therapies (In Progress)     Topic: Occupational Therapy (Done)     Point: ADL training (Done)     Description:   Instruct learner(s) on proper safety adaptation and remediation techniques during self care or transfers.   Instruct in proper use of assistive devices.              Learning Progress Summary           Patient Acceptance, E,TB,D, VU by BRITTANY at 4/17/2023 1208    Acceptance, E,TB, VU by LW at 4/16/2023 1259    Acceptance, E,TB,H, VU by LW at 4/15/2023 1321    Comment: Educated patient on fall precautions and Home safety    Acceptance, E,TB, VU by CM at 4/14/2023 1411    Comment: OT POC, role of OT, d/c recommendations, NWB status   Family Acceptance, E,TB, VU by CM at 4/14/2023 1411    Comment: OT POC, role of OT, d/c recommendations, NWB status                   Point: Home exercise program (Done)     Description:   Instruct learner(s) on appropriate technique for monitoring, assisting and/or progressing  therapeutic exercises/activities.              Learning Progress Summary           Patient Acceptance, E,TB,D, VU by CS at 4/17/2023 1208    Acceptance, E,TB, VU by LW at 4/16/2023 1259    Acceptance, E,TB,H, VU by LW at 4/15/2023 1321    Comment: Educated patient on fall precautions and Home safety                   Point: Precautions (Done)     Description:   Instruct learner(s) on prescribed precautions during self-care and functional transfers.              Learning Progress Summary           Patient Acceptance, E, VU,NR by  at 4/19/2023 1128    Acceptance, E,TB,D, VU by CS at 4/17/2023 1208    Acceptance, E,TB, VU by LW at 4/16/2023 1259    Acceptance, E,TB,H, VU by LW at 4/15/2023 1321    Comment: Educated patient on fall precautions and Home safety    Acceptance, E,TB, VU by CM at 4/14/2023 1411    Comment: OT POC, role of OT, d/c recommendations, NWB status   Family Acceptance, E,TB, VU by CM at 4/14/2023 1411    Comment: OT POC, role of OT, d/c recommendations, NWB status                   Point: Body mechanics (Done)     Description:   Instruct learner(s) on proper positioning and spine alignment during self-care, functional mobility activities and/or exercises.              Learning Progress Summary           Patient Acceptance, E,TB,D, VU by  at 4/17/2023 1208    Acceptance, E,TB, VU by LW at 4/16/2023 1259    Acceptance, E,TB,H, VU by LW at 4/15/2023 1321    Comment: Educated patient on fall precautions and Home safety    Acceptance, E,TB, VU by CM at 4/14/2023 1411    Comment: OT POC, role of OT, d/c recommendations, NWB status   Family Acceptance, E,TB, VU by CM at 4/14/2023 1411    Comment: OT POC, role of OT, d/c recommendations, NWB status                               User Key     Initials Effective Dates Name Provider Type Discipline     06/16/21 -  Eugenie Ireland COTA Occupational Therapist Assistant OT     06/16/21 -  Rosario Guzman COTA Occupational Therapist Assistant OT      06/16/21 -  Oanh Pacheco COTA Occupational Therapist Assistant OT    CM 11/18/22 -  Sameera Kang OT Occupational Therapist OT              OT Recommendation and Plan  Therapy Frequency (OT): daily  Plan of Care Review  Plan of Care Reviewed With: patient  Progress: improving  Outcome Evaluation: Pt tolerated tx well this date. Pt c/o R LE pain. Pt completed an full ADL. No goals met this tx. Continue OT POC.     Time Calculation:    Time Calculation- OT     Row Name 04/20/23 1514             Time Calculation- OT    OT Start Time 0830  -CS      OT Stop Time 1000  -CS      OT Time Calculation (min) 90 min  -CS      Total Timed Code Minutes- OT 90 minute(s)  -CS      OT Received On 04/20/23  -CS         Timed Charges    18272 - OT Self Care/Mgmt Minutes 90  -CS         Total Minutes    Timed Charges Total Minutes 90  -CS       Total Minutes 90  -CS            User Key  (r) = Recorded By, (t) = Taken By, (c) = Cosigned By    Initials Name Provider Type    CS Rosario Guzman COTA Occupational Therapist Assistant              Therapy Charges for Today     Code Description Service Date Service Provider Modifiers Qty    [id number] HC OT SELF CARE/MGMT/TRAIN EA 15 MIN 4/20/2023 Rosario Guzman COTA GO 6               ZIGGY Martinez  4/20/2023

## 2023-04-20 NOTE — PLAN OF CARE
Goal Outcome Evaluation:               Vss, wound vac canister and hose changed today due to clogging and coming off, suction maintained and patent. Up to chair all day and to BSC x 2 assist. To [facility] tomorrow.

## 2023-04-20 NOTE — H&P (VIEW-ONLY)
Podiatric Surgery Progress Note    Subjective     Post-Operative :  post-left reduction left ankle deformity with application of external fixator  Systemic or Specific Complaints: No Complaints    Objective     Vital signs in last 24 hours:  Temp:  [98.2 °F (36.8 °C)-98.4 °F (36.9 °C)] 98.2 °F (36.8 °C)  Heart Rate:  [68-87] 68  Resp:  [18] 18  BP: (112-129)/(60-68) 129/63    General: alert, appears stated age and cooperative   Neurovascular:  Sensation left lower extremity absent  Capillary refill: Normal   Wound:  dressing c/d/i. Drain intact with ~15 mL output   Range of Motion:  Absent secondary to frame application   DVT Exam: No evidence of DVT seen on physical exam.     Data Review  CBC:  Results from last 7 days   Lab Units 04/20/23  0617   WBC 10*3/mm3 4.90   RBC 10*6/mm3 3.45*   HEMOGLOBIN g/dL 10.3*   HEMATOCRIT % 31.7*   PLATELETS 10*3/mm3 155       Assessment & Plan     Charcot left ankle    Patient doing well postoperatively.  Plan to DC tomorrow a.m.     LOS: 4 days     Marco A Thompson DPM    Date: 4/20/2023  Time: 16:39 CDT

## 2023-04-21 ENCOUNTER — HOSPITAL ENCOUNTER (OUTPATIENT)
Facility: HOSPITAL | Age: 63
Discharge: OTHER INSTITUTION | End: 2023-05-15
Attending: INTERNAL MEDICINE | Admitting: INTERNAL MEDICINE
Payer: COMMERCIAL

## 2023-04-21 ENCOUNTER — DOCUMENTATION (OUTPATIENT)
Dept: WOUND CARE | Facility: HOSPITAL | Age: 63
End: 2023-04-21
Payer: COMMERCIAL

## 2023-04-21 VITALS
HEIGHT: 72 IN | SYSTOLIC BLOOD PRESSURE: 128 MMHG | RESPIRATION RATE: 18 BRPM | OXYGEN SATURATION: 97 % | WEIGHT: 315 LBS | BODY MASS INDEX: 42.66 KG/M2 | HEART RATE: 97 BPM | DIASTOLIC BLOOD PRESSURE: 73 MMHG | TEMPERATURE: 98.6 F

## 2023-04-21 DIAGNOSIS — I42.8 NON-ISCHEMIC CARDIOMYOPATHY: ICD-10-CM

## 2023-04-21 DIAGNOSIS — R07.9 CHEST PAIN, UNSPECIFIED TYPE: Primary | ICD-10-CM

## 2023-04-21 DIAGNOSIS — R77.8 ELEVATED TROPONIN: ICD-10-CM

## 2023-04-21 DIAGNOSIS — Z74.09 IMPAIRED MOBILITY AND ADLS: ICD-10-CM

## 2023-04-21 DIAGNOSIS — I48.0 PAF (PAROXYSMAL ATRIAL FIBRILLATION): ICD-10-CM

## 2023-04-21 DIAGNOSIS — Z78.9 IMPAIRED MOBILITY AND ADLS: ICD-10-CM

## 2023-04-21 DIAGNOSIS — Z74.09 IMPAIRED FUNCTIONAL MOBILITY, BALANCE, GAIT, AND ENDURANCE: ICD-10-CM

## 2023-04-21 DIAGNOSIS — M14.672 CHARCOT'S JOINT OF LEFT FOOT: ICD-10-CM

## 2023-04-21 LAB
ANION GAP SERPL CALCULATED.3IONS-SCNC: 9 MMOL/L (ref 5–15)
BASOPHILS # BLD AUTO: 0.03 10*3/MM3 (ref 0–0.2)
BASOPHILS NFR BLD AUTO: 0.6 % (ref 0–1.5)
BUN SERPL-MCNC: 15 MG/DL (ref 8–23)
BUN/CREAT SERPL: 16.9 (ref 7–25)
CALCIUM SPEC-SCNC: 8.5 MG/DL (ref 8.6–10.5)
CHLORIDE SERPL-SCNC: 105 MMOL/L (ref 98–107)
CO2 SERPL-SCNC: 24 MMOL/L (ref 22–29)
CREAT SERPL-MCNC: 0.89 MG/DL (ref 0.76–1.27)
DEPRECATED RDW RBC AUTO: 46.4 FL (ref 37–54)
EGFRCR SERPLBLD CKD-EPI 2021: 97.5 ML/MIN/1.73
EOSINOPHIL # BLD AUTO: 0.2 10*3/MM3 (ref 0–0.4)
EOSINOPHIL NFR BLD AUTO: 3.8 % (ref 0.3–6.2)
ERYTHROCYTE [DISTWIDTH] IN BLOOD BY AUTOMATED COUNT: 13.7 % (ref 12.3–15.4)
GLUCOSE BLDC GLUCOMTR-MCNC: 112 MG/DL (ref 70–130)
GLUCOSE BLDC GLUCOMTR-MCNC: 137 MG/DL (ref 70–130)
GLUCOSE BLDC GLUCOMTR-MCNC: 99 MG/DL (ref 70–130)
GLUCOSE SERPL-MCNC: 124 MG/DL (ref 65–99)
HCT VFR BLD AUTO: 30.1 % (ref 37.5–51)
HGB BLD-MCNC: 9.9 G/DL (ref 13–17.7)
IMM GRANULOCYTES # BLD AUTO: 0.11 10*3/MM3 (ref 0–0.05)
IMM GRANULOCYTES NFR BLD AUTO: 2.1 % (ref 0–0.5)
LYMPHOCYTES # BLD AUTO: 1.42 10*3/MM3 (ref 0.7–3.1)
LYMPHOCYTES NFR BLD AUTO: 27 % (ref 19.6–45.3)
MCH RBC QN AUTO: 30.2 PG (ref 26.6–33)
MCHC RBC AUTO-ENTMCNC: 32.9 G/DL (ref 31.5–35.7)
MCV RBC AUTO: 91.8 FL (ref 79–97)
MONOCYTES # BLD AUTO: 0.75 10*3/MM3 (ref 0.1–0.9)
MONOCYTES NFR BLD AUTO: 14.3 % (ref 5–12)
NEUTROPHILS NFR BLD AUTO: 2.74 10*3/MM3 (ref 1.7–7)
NEUTROPHILS NFR BLD AUTO: 52.2 % (ref 42.7–76)
NRBC BLD AUTO-RTO: 0.6 /100 WBC (ref 0–0.2)
PLATELET # BLD AUTO: 156 10*3/MM3 (ref 140–450)
PMV BLD AUTO: 10.7 FL (ref 6–12)
POTASSIUM SERPL-SCNC: 4.2 MMOL/L (ref 3.5–5.2)
RBC # BLD AUTO: 3.28 10*6/MM3 (ref 4.14–5.8)
SODIUM SERPL-SCNC: 138 MMOL/L (ref 136–145)
WBC NRBC COR # BLD: 5.25 10*3/MM3 (ref 3.4–10.8)

## 2023-04-21 PROCEDURE — 25010000002 HEPARIN (PORCINE) PER 1000 UNITS: Performed by: PODIATRIST

## 2023-04-21 PROCEDURE — 97110 THERAPEUTIC EXERCISES: CPT

## 2023-04-21 PROCEDURE — 25010000002 VANCOMYCIN 10 G RECONSTITUTED SOLUTION: Performed by: INTERNAL MEDICINE

## 2023-04-21 PROCEDURE — 82962 GLUCOSE BLOOD TEST: CPT

## 2023-04-21 PROCEDURE — 85025 COMPLETE CBC W/AUTO DIFF WBC: CPT | Performed by: PODIATRIST

## 2023-04-21 PROCEDURE — 97530 THERAPEUTIC ACTIVITIES: CPT

## 2023-04-21 PROCEDURE — 25010000002 VANCOMYCIN 10 G RECONSTITUTED SOLUTION: Performed by: PODIATRIST

## 2023-04-21 PROCEDURE — 80048 BASIC METABOLIC PNL TOTAL CA: CPT | Performed by: PODIATRIST

## 2023-04-21 PROCEDURE — 25010000002 AMPICILLIN-SULBACTAM PER 1.5 G: Performed by: PODIATRIST

## 2023-04-21 PROCEDURE — 25010000002 HEPARIN (PORCINE) PER 1000 UNITS: Performed by: INTERNAL MEDICINE

## 2023-04-21 RX ORDER — CETIRIZINE HYDROCHLORIDE 10 MG/1
10 TABLET ORAL DAILY
Status: DISCONTINUED | OUTPATIENT
Start: 2023-04-22 | End: 2023-05-15 | Stop reason: HOSPADM

## 2023-04-21 RX ORDER — HYDRALAZINE HYDROCHLORIDE 20 MG/ML
10 INJECTION INTRAMUSCULAR; INTRAVENOUS EVERY 4 HOURS PRN
Status: DISCONTINUED | OUTPATIENT
Start: 2023-04-21 | End: 2023-05-15 | Stop reason: HOSPADM

## 2023-04-21 RX ORDER — GLIPIZIDE 10 MG/1
10 TABLET ORAL
Status: DISCONTINUED | OUTPATIENT
Start: 2023-04-22 | End: 2023-05-15 | Stop reason: HOSPADM

## 2023-04-21 RX ORDER — BISACODYL 10 MG
5 SUPPOSITORY, RECTAL RECTAL DAILY PRN
Status: DISCONTINUED | OUTPATIENT
Start: 2023-04-21 | End: 2023-05-15 | Stop reason: HOSPADM

## 2023-04-21 RX ORDER — MUSCLE RUB CREAM 100; 150 MG/G; MG/G
1 CREAM TOPICAL
Status: DISCONTINUED | OUTPATIENT
Start: 2023-04-21 | End: 2023-05-15 | Stop reason: HOSPADM

## 2023-04-21 RX ORDER — ASCORBIC ACID 500 MG
1000 TABLET ORAL NIGHTLY
Status: DISCONTINUED | OUTPATIENT
Start: 2023-04-21 | End: 2023-05-15 | Stop reason: HOSPADM

## 2023-04-21 RX ORDER — OXYCODONE AND ACETAMINOPHEN 7.5; 325 MG/1; MG/1
1 TABLET ORAL EVERY 4 HOURS PRN
Status: DISCONTINUED | OUTPATIENT
Start: 2023-04-21 | End: 2023-04-21

## 2023-04-21 RX ORDER — INSULIN ASPART 100 [IU]/ML
2-12 INJECTION, SOLUTION INTRAVENOUS; SUBCUTANEOUS
Status: DISCONTINUED | OUTPATIENT
Start: 2023-04-21 | End: 2023-05-15 | Stop reason: HOSPADM

## 2023-04-21 RX ORDER — HEPARIN SODIUM 5000 [USP'U]/ML
5000 INJECTION, SOLUTION INTRAVENOUS; SUBCUTANEOUS EVERY 8 HOURS SCHEDULED
Status: DISCONTINUED | OUTPATIENT
Start: 2023-04-21 | End: 2023-04-25

## 2023-04-21 RX ORDER — ONDANSETRON 2 MG/ML
4 INJECTION INTRAMUSCULAR; INTRAVENOUS EVERY 4 HOURS PRN
Status: DISCONTINUED | OUTPATIENT
Start: 2023-04-21 | End: 2023-05-15 | Stop reason: HOSPADM

## 2023-04-21 RX ORDER — MELATONIN
5000 NIGHTLY
Status: DISCONTINUED | OUTPATIENT
Start: 2023-04-21 | End: 2023-05-15 | Stop reason: HOSPADM

## 2023-04-21 RX ORDER — SIMETHICONE 80 MG
80 TABLET,CHEWABLE ORAL 4 TIMES DAILY PRN
Status: DISCONTINUED | OUTPATIENT
Start: 2023-04-21 | End: 2023-04-24

## 2023-04-21 RX ORDER — LOSARTAN POTASSIUM 25 MG/1
25 TABLET ORAL DAILY
Status: DISCONTINUED | OUTPATIENT
Start: 2023-04-22 | End: 2023-04-25

## 2023-04-21 RX ORDER — CALCIUM CARBONATE 500 MG/1
2 TABLET, CHEWABLE ORAL 3 TIMES DAILY PRN
Status: DISCONTINUED | OUTPATIENT
Start: 2023-04-21 | End: 2023-05-15 | Stop reason: HOSPADM

## 2023-04-21 RX ORDER — OXYCODONE AND ACETAMINOPHEN 7.5; 325 MG/1; MG/1
1 TABLET ORAL EVERY 4 HOURS PRN
Status: DISPENSED | OUTPATIENT
Start: 2023-04-21 | End: 2023-04-28

## 2023-04-21 RX ORDER — ACETAMINOPHEN 325 MG/1
650 TABLET ORAL EVERY 6 HOURS PRN
Status: DISCONTINUED | OUTPATIENT
Start: 2023-04-21 | End: 2023-05-15 | Stop reason: HOSPADM

## 2023-04-21 RX ORDER — METOPROLOL SUCCINATE 25 MG/1
25 TABLET, EXTENDED RELEASE ORAL 2 TIMES DAILY
Status: DISCONTINUED | OUTPATIENT
Start: 2023-04-21 | End: 2023-05-15 | Stop reason: HOSPADM

## 2023-04-21 RX ORDER — FLUTICASONE PROPIONATE 50 MCG
2 SPRAY, SUSPENSION (ML) NASAL DAILY PRN
Status: DISCONTINUED | OUTPATIENT
Start: 2023-04-21 | End: 2023-05-05

## 2023-04-21 RX ORDER — DOCUSATE SODIUM 100 MG/1
100 CAPSULE, LIQUID FILLED ORAL 2 TIMES DAILY
Status: DISCONTINUED | OUTPATIENT
Start: 2023-04-21 | End: 2023-04-29

## 2023-04-21 RX ORDER — AMOXICILLIN 250 MG
2 CAPSULE ORAL 2 TIMES DAILY
Status: DISCONTINUED | OUTPATIENT
Start: 2023-04-21 | End: 2023-05-15 | Stop reason: HOSPADM

## 2023-04-21 RX ORDER — SODIUM CHLORIDE 0.9 % (FLUSH) 0.9 %
3 SYRINGE (ML) INJECTION AS NEEDED
Status: DISCONTINUED | OUTPATIENT
Start: 2023-04-21 | End: 2023-05-15 | Stop reason: HOSPADM

## 2023-04-21 RX ORDER — SODIUM CHLORIDE 0.9 % (FLUSH) 0.9 %
3 SYRINGE (ML) INJECTION EVERY 12 HOURS
Status: DISCONTINUED | OUTPATIENT
Start: 2023-04-21 | End: 2023-05-15 | Stop reason: HOSPADM

## 2023-04-21 RX ADMIN — SODIUM CHLORIDE 100 ML/HR: 9 INJECTION, SOLUTION INTRAVENOUS at 00:48

## 2023-04-21 RX ADMIN — AMPICILLIN SODIUM AND SULBACTAM SODIUM 3 G: 2; 1 INJECTION, POWDER, FOR SOLUTION INTRAMUSCULAR; INTRAVENOUS at 00:41

## 2023-04-21 RX ADMIN — VANCOMYCIN HYDROCHLORIDE 1750 MG: 10 INJECTION, POWDER, LYOPHILIZED, FOR SOLUTION INTRAVENOUS at 01:32

## 2023-04-21 RX ADMIN — LOSARTAN POTASSIUM 25 MG: 25 TABLET, FILM COATED ORAL at 08:04

## 2023-04-21 RX ADMIN — Medication 10 ML: at 08:06

## 2023-04-21 RX ADMIN — HEPARIN SODIUM 5000 UNITS: 5000 INJECTION INTRAVENOUS; SUBCUTANEOUS at 06:20

## 2023-04-21 RX ADMIN — METFORMIN HYDROCHLORIDE 1000 MG: 500 TABLET, FILM COATED ORAL at 08:04

## 2023-04-21 RX ADMIN — AMPICILLIN SODIUM AND SULBACTAM SODIUM 3 G: 2; 1 INJECTION, POWDER, FOR SOLUTION INTRAMUSCULAR; INTRAVENOUS at 06:20

## 2023-04-21 RX ADMIN — Medication 400 MG: at 08:05

## 2023-04-21 RX ADMIN — METOPROLOL SUCCINATE 25 MG: 25 TABLET, FILM COATED, EXTENDED RELEASE ORAL at 08:04

## 2023-04-21 RX ADMIN — GLIPIZIDE 10 MG: 5 TABLET ORAL at 08:05

## 2023-04-21 RX ADMIN — CETIRIZINE HYDROCHLORIDE 10 MG: 10 TABLET, FILM COATED ORAL at 08:04

## 2023-04-21 NOTE — ACP (ADVANCE CARE PLANNING)
64yo male admit to [facility] 4/13 w/ cellulitis left foot w/ deformity and s/p surgical intervention and external fixator placement. Hx DM and RA. T/f to [facility].    Glu 137, alb 4.3 on 4/13    No meds orders entered as yet    ADA diet, intakes were adequate at [facility]  Noted shell fish allergy    72in, #    2/2023 362# and # w/ BMI 50.1    RD to follow hospital course.

## 2023-04-21 NOTE — THERAPY TREATMENT NOTE
Patient Name: Emre Lisa  : 1960    MRN: [id number]                              Today's Date: 2023       Admit Date: 2023    Visit Dx:     ICD-10-CM ICD-9-CM   1. Cellulitis of left foot  L03.116 682.7   2. Charcot's joint of left foot  M14.672 094.0     713.5   3. Type 2 diabetes mellitus with foot ulcer, unspecified whether long term insulin use  E11.621 250.80    L97.509 707.15   4. Impaired functional mobility, balance, gait, and endurance  Z74.09 V49.89   5. Impaired mobility and ADLs  Z74.09 V49.89    Z78.9      Patient Active Problem List   Diagnosis   • Foot osteomyelitis, right   • Nodule of groin   • Type 2 diabetes mellitus with skin complication   • Status post transmetatarsal amputation of right foot   • Hammer toe of left foot   • Hallux malleus of left foot   • Cellulitis of left foot   • Infected hardware in left leg   • Chronic multifocal osteomyelitis of left foot   • Charcot's joint of left foot   • Skin ulcer of left foot, limited to breakdown of skin     Past Medical History:   Diagnosis Date   • Arthritis    • Atrial fibrillation    • Diabetes mellitus    • Diabetic foot ulcer associated with type 2 diabetes mellitus     left great toe   • Hallux malleus of left foot    • Hammer toe    • Hypertension    • MI (myocardial infarction)    • Neuropathy in diabetes    • Onychomycosis    • Presence of biventricular cardiac pacemaker    • Rheumatoid arthritis      Past Surgical History:   Procedure Laterality Date   • AMPUTATION DIGIT Right 3/5/2017    Procedure: RIGHT AMPUTATION TRANSMETATRASAL , RECESSION GASTROCNEMOUS;  Surgeon: Marco A Thompson DPM;  Location: [facility];  Service:    • CARDIAC DEFIBRILLATOR PLACEMENT  ,    • CARPAL TUNNEL RELEASE  2015    elbow and wrist left arm   • FOOT IRRIGATION, DEBRIDEMENT AND REPAIR Left 3/7/2018    Procedure: FOOT IRRIGATION, DEBRIDEMENT AND REPAIR;  Surgeon: Marco A Thompson DPM;  Location: [facility];  Service:    • FOOT  IRRIGATION, DEBRIDEMENT AND REPAIR Left 3/10/2018    Procedure: FOOT IRRIGATION, DEBRIDEMENT AND REPAIR;  Surgeon: Marco A Thompson DPM;  Location: [facility] OR;  Service: Podiatry   • FOOT WOUND CLOSURE Right 3/2/2017    Procedure: INCISION AND DRAINAGE, RIGHT FOOT;  Surgeon: Tung Rosenthal DPM;  Location: [facility] OR;  Service:    • HAMMER TOE REPAIR Left 2/15/2018    Procedure: HAMMERTOE CORRECTION LEFT SECOND, THIRD AND FOURTH TOES AND HALLUX INTERPHALANGEAL JOINT ARTHRODESIS LEFT FOOT (Micro Asnis, 4.0 & 5.0 Asnis)      (c-arm);  Surgeon: Marco A Thompson DPM;  Location: [facility] OR;  Service:    • HARDWARE REMOVAL Left 3/5/2018    Procedure: ANKLE/FOOT HARDWARE REMOVAL;  Surgeon: Marco A Thompson DPM;  Location: [facility] OR;  Service:    • INCISION AND DRAINAGE LEG Left 3/5/2018    Procedure: INCISION AND DRAINAGE LOWER EXTREMITY;  Surgeon: Marco A Thompson DPM;  Location: [facility] OR;  Service:    • PLANTAR FASCIA RELEASE Left 11/21/2019    Procedure: PLANTAR PLANING LEFT FOOT AND ALL OTHER INDICATED PROCEDURES;  Surgeon: Marco A Thompson DPM;  Location: [facility] OR;  Service: Podiatry   • TOE AMPUTATION Right 2016   • TONSILECTOMY, ADENOIDECTOMY, BILATERAL MYRINGOTOMY AND TUBES      age 25      General Information     Row Name 04/21/23 0837          OT Time and Intention    Document Type therapy note (daily note)  -CS     Mode of Treatment occupational therapy  -CS     Row Name 04/21/23 0837          General Information    Patient Profile Reviewed yes  -CS     Existing Precautions/Restrictions fall;non-weight bearing;left  -CS     Row Name 04/21/23 0837          Cognition    Orientation Status (Cognition) oriented x 4  -CS     Row Name 04/21/23 0837          Safety Issues, Functional Mobility    Impairments Affecting Function (Mobility) strength;pain  -CS           User Key  (r) = Recorded By, (t) = Taken By, (c) = Cosigned By    Initials Name Provider Type    CS Rosario Guzman COTA Occupational Therapist Assistant                  Mobility/ADL's     Row Name 04/21/23 0837          Transfers    Transfers sit-stand transfer;stand-sit transfer;other (see comments)  -CS     Row Name 04/21/23 0837          Sit-Stand Transfer    Sit-Stand Graves (Transfers) moderate assist (50% patient effort);2 person assist  -CS     Assistive Device (Sit-Stand Transfers) walker, front-wheeled  -CS     Row Name 04/21/23 0837          Stand-Sit Transfer    Stand-Sit Graves (Transfers) moderate assist (50% patient effort);2 person assist  -CS     Assistive Device (Stand-Sit Transfers) walker, front-wheeled  -CS     Row Name 04/21/23 0837          Functional Mobility    Functional Mobility- Ind. Level moderate assist (50% patient effort);2 person assist required  -CS     Functional Mobility- Device walker, front-wheeled  -CS     Row Name 04/21/23 0837          Mobility    Extremity Weight-bearing Status left lower extremity  -CS     Left Lower Extremity (Weight-bearing Status) non weight-bearing (NWB)  -CS     Row Name 04/21/23 0837          Wheelchair Transfer    Type (Wheelchair Transfer) sit-stand;stand-sit  -     Graves Level (Wheelchair Transfer) moderate assist (50% patient effort);2 person assist  -CS     Assistive Device (Wheelchair Transfer) walker, front-wheeled  -CS           User Key  (r) = Recorded By, (t) = Taken By, (c) = Cosigned By    Initials Name Provider Type    CS Rosario Guzman COTA Occupational Therapist Assistant               Obj/Interventions     Row Name 04/21/23 0837          Shoulder (Therapeutic Exercise)    Shoulder (Therapeutic Exercise) AROM (active range of motion)  -     Shoulder AROM (Therapeutic Exercise) bilateral;flexion;extension  -     Row Name 04/21/23 0837          Elbow/Forearm (Therapeutic Exercise)    Elbow/Forearm (Therapeutic Exercise) AROM (active range of motion)  -     Elbow/Forearm AROM (Therapeutic Exercise) bilateral;flexion;extension  -     Row Name 04/21/23 0837           Motor Skills    Therapeutic Exercise shoulder;elbow/forearm  -CS           User Key  (r) = Recorded By, (t) = Taken By, (c) = Cosigned By    Initials Name Provider Type    CS Rosario Guzman COTA Occupational Therapist Assistant               Goals/Plan     Row Name 04/21/23 0837          Transfer Goal 1 (OT)    Activity/Assistive Device (Transfer Goal 1, OT) sit-to-stand/stand-to-sit;bed-to-chair/chair-to-bed;commode, bedside with drop arms  -CS     Refugio Level/Cues Needed (Transfer Goal 1, OT) moderate assist (50-74% patient effort)  -CS     Time Frame (Transfer Goal 1, OT) long term goal (LTG);by discharge  -CS     Progress/Outcome (Transfer Goal 1, OT) goal not met  -     Row Name 04/21/23 0837          Bathing Goal 1 (OT)    Activity/Device (Bathing Goal 1, OT) lower body bathing  -CS     Refugio Level/Cues Needed (Bathing Goal 1, OT) moderate assist (50-74% patient effort)  -CS     Time Frame (Bathing Goal 1, OT) long term goal (LTG);by discharge  -CS     Progress/Outcomes (Bathing Goal 1, OT) goal not met  -     Row Name 04/21/23 0837          Dressing Goal 1 (OT)    Activity/Device (Dressing Goal 1, OT) upper body dressing  -CS     Refugio/Cues Needed (Dressing Goal 1, OT) set-up required  -CS     Time Frame (Dressing Goal 1, OT) long term goal (LTG);by discharge  -CS     Progress/Outcome (Dressing Goal 1, OT) goal not met;good progress toward goal  -CS     Row Name 04/21/23 0837          Toileting Goal 1 (OT)    Activity/Device (Toileting Goal 1, OT) toileting skills, all;adjust/manage clothing;perform perineal hygiene;commode, 3-in-1  -CS     Refugio Level/Cues Needed (Toileting Goal 1, OT) minimum assist (75% or more patient effort)  -CS     Time Frame (Toileting Goal 1, OT) long term goal (LTG);by discharge  -CS     Progress/Outcome (Toileting Goal 1, OT) goal not met  -CS           User Key  (r) = Recorded By, (t) = Taken By, (c) = Cosigned By    Initials Name Provider  Type    CS Rosario Guzman COTA Occupational Therapist Assistant               Clinical Impression     Row Name 04/21/23 0837          Pain Assessment    Pretreatment Pain Rating 2/10  -CS     Posttreatment Pain Rating 2/10  -CS     Pain Location - Side/Orientation Right  -CS     Pain Location lower  -CS     Pain Location - knee  -CS     Pain Intervention(s) Repositioned;Rest  -CS     Row Name 04/21/23 0837          Plan of Care Review    Plan of Care Reviewed With patient  -CS     Progress improving  -CS     Row Name 04/21/23 0837          Therapy Assessment/Plan (OT)    Rehab Potential (OT) good, to achieve stated therapy goals  -CS     Criteria for Skilled Therapeutic Interventions Met (OT) yes;meets criteria  -CS     Therapy Frequency (OT) daily  -CS     Row Name 04/21/23 0837          Therapy Plan Review/Discharge Plan (OT)    Anticipated Discharge Disposition (OT) inpatient rehabilitation facility;[facility])  -CS     Row Name 04/21/23 0837          Vital Signs    Pre Patient Position Sitting  -CS     Intra Patient Position Standing  -CS     Post Patient Position Sitting  -CS     Row Name 04/21/23 08          Positioning and Restraints    Pre-Treatment Position sitting in chair/recliner  -CS     Post Treatment Position wheelchair  -CS     In Wheelchair sitting;with other staff  -CS           User Key  (r) = Recorded By, (t) = Taken By, (c) = Cosigned By    Initials Name Provider Type    CS Rosario Guzman COTA Occupational Therapist Assistant               Outcome Measures     Row Name 04/21/23 08          How much help from another is currently needed...    Putting on and taking off regular lower body clothing? 1  -CS     Bathing (including washing, rinsing, and drying) 3  -CS     Toileting (which includes using toilet bed pan or urinal) 2  -CS     Putting on and taking off regular upper body clothing 3  -CS     Taking care of personal grooming (such as brushing teeth) 4  -CS      Eating meals 4  -CS     AM-PAC 6 Clicks Score (OT) 17  -CS           User Key  (r) = Recorded By, (t) = Taken By, (c) = Cosigned By    Initials Name Provider Type    Rosario Frye COTA Occupational Therapist Assistant                Occupational Therapy Education     Title: PT OT SLP Therapies (In Progress)     Topic: Occupational Therapy (Resolved)     Point: ADL training (Resolved)     Description:   Instruct learner(s) on proper safety adaptation and remediation techniques during self care or transfers.   Instruct in proper use of assistive devices.              Learning Progress Summary           Patient Acceptance, E,TB,D, VU by CS at 4/17/2023 1208    Acceptance, E,TB, VU by LW at 4/16/2023 1259    Acceptance, E,TB,H, VU by LW at 4/15/2023 1321    Comment: Educated patient on fall precautions and Home safety    Acceptance, E,TB, VU by CM at 4/14/2023 1411    Comment: OT POC, role of OT, d/c recommendations, NWB status   Family Acceptance, E,TB, VU by CM at 4/14/2023 1411    Comment: OT POC, role of OT, d/c recommendations, NWB status                   Point: Home exercise program (Resolved)     Description:   Instruct learner(s) on appropriate technique for monitoring, assisting and/or progressing therapeutic exercises/activities.              Learning Progress Summary           Patient Acceptance, E,TB,D, VU by CS at 4/17/2023 1208    Acceptance, E,TB, VU by LW at 4/16/2023 1259    Acceptance, E,TB,H, VU by LW at 4/15/2023 1321    Comment: Educated patient on fall precautions and Home safety                   Point: Precautions (Resolved)     Description:   Instruct learner(s) on prescribed precautions during self-care and functional transfers.              Learning Progress Summary           Patient Acceptance, E, VU,NR by  at 4/19/2023 1128    Acceptance, E,TB,D, VU by CS at 4/17/2023 1208    Acceptance, E,TB, VU by LW at 4/16/2023 1259    Acceptance, E,TB,H, VU by LW at 4/15/2023 1321    Comment:  Educated patient on fall precautions and Home safety    Acceptance, E,TB, VU by CM at 4/14/2023 1411    Comment: OT POC, role of OT, d/c recommendations, NWB status   Family Acceptance, E,TB, VU by CM at 4/14/2023 1411    Comment: OT POC, role of OT, d/c recommendations, NWB status                   Point: Body mechanics (Resolved)     Description:   Instruct learner(s) on proper positioning and spine alignment during self-care, functional mobility activities and/or exercises.              Learning Progress Summary           Patient Acceptance, E,TB,D, VU by CS at 4/17/2023 1208    Acceptance, E,TB, VU by LW at 4/16/2023 1259    Acceptance, E,TB,H, VU by LW at 4/15/2023 1321    Comment: Educated patient on fall precautions and Home safety    Acceptance, E,TB, VU by CM at 4/14/2023 1411    Comment: OT POC, role of OT, d/c recommendations, NWB status   Family Acceptance, E,TB, VU by CM at 4/14/2023 1411    Comment: OT POC, role of OT, d/c recommendations, NWB status                               User Key     Initials Effective Dates Name Provider Type Discipline     06/16/21 -  Eugenie Ireland COTA Occupational Therapist Assistant OT    CS 06/16/21 -  Rosario Guzman COTA Occupational Therapist Assistant OT    LW 06/16/21 -  Oanh Pacheco COTA Occupational Therapist Assistant OT    CM 11/18/22 -  Sameera Kang OT Occupational Therapist OT              OT Recommendation and Plan  Therapy Frequency (OT): daily  Plan of Care Review  Plan of Care Reviewed With: patient  Progress: improving  Outcome Evaluation: Pt tolerated tx well this date. Pt c/o R LE pain. Pt completed an full ADL. No goals met this tx. Continue OT POC.     Time Calculation:    Time Calculation- OT     Row Name 04/21/23 1135             Time Calculation- OT    OT Start Time 0837  -CS      OT Stop Time 0900  -CS      OT Time Calculation (min) 23 min  -CS      Total Timed Code Minutes- OT 23 minute(s)  -CS      OT Received On 04/21/23  -          Timed Charges    70172 - OT Therapeutic Exercise Minutes 8  -CS      79349 - OT Therapeutic Activity Minutes 15  -CS         Total Minutes    Timed Charges Total Minutes 23  -CS       Total Minutes 23  -CS            User Key  (r) = Recorded By, (t) = Taken By, (c) = Cosigned By    Initials Name Provider Type    CS Rosario Guzman COTA Occupational Therapist Assistant              Therapy Charges for Today     Code Description Service Date Service Provider Modifiers Qty    92177802725 HC OT SELF CARE/MGMT/TRAIN EA 15 MIN 4/20/2023 Rosario Guzman COTA GO 6    42601777617 HC OT THER PROC EA 15 MIN 4/21/2023 Rosario Guzman COTA GO 1    00222385368 HC OT THERAPEUTIC ACT EA 15 MIN 4/21/2023 Rosario Guzman COTA GO 1               ZIGGY Martinez  4/21/2023

## 2023-04-21 NOTE — PROGRESS NOTES
[facility]  INPATIENT WOUND & OSTOMY CARE    PROGRESS NOTE    Today's Date: 04/21/23    Patient Name: Emre Lisa  MRN: [id number]  CSN: [id number]  PCP: Jamaal Bravo MD  Referring Provider: Marco A Thompson DPM  Attending Provider: Marco A Thompson DPM  Length of Stay: 0    Woundvac changed. Suction at 125 mmHg. Patient tolerated procedure well[facility]        This document has been electronically signed by WALTER Duncan on April 21, 2023 13:33 CDT

## 2023-04-21 NOTE — ACP (ADVANCE CARE PLANNING)
[facility] @ [facility]  INPATIENT HISTORY AND PHYSICAL NOTE    PATIENT NAME: Emre Lisa      PHYSICIAN: Charlie Persaud MD  : 1960        MRN: [id number]  Patient Care Team:  Jamaal Bravo MD as PCP - Marco A Chauhan DPM as Consulting Physician (Podiatry)    Assessment      Cellulitis of left foot  Charcot's joint of left foot  Type 2 DM  Arthritis  A-fib  Hypertension  Rheumatoid arthritis  DVT & GI prophylaxis    DVT Prophylaxis: Heparin  GI Prophylaxis: Protonix  Code Status: Full    Plan     Stable at time of exam  We will administer all medications from previous hospitalization  We will follow podiatry recommendations  Continue IV antibiotic therapy as before  Pharmacy to dose vancomycin  Non-weight bearing to left lower extremity  Therapy to evaluate and treat  Strongly recommend out of bed daily  Monitor I&O  Telemetry  We will monitor FSBS and administer insulin coverage as ordered.  -DVT and GI prophylaxis in place.  -We'll continue monitoring patient in hospital setting and treat patient as course dictates.  -Please review orders for detailed plan of care.  -For Acute and Chronic medical condition we will continue medications described below in medication section which have been reviewed and we will continue unless changed in plan of care.  -Laboratory and diagnostic studies have been independently reviewed and the reports are reviewed as documented below.    Chief Complaint:  Continued antibiotic therapy and medical management  History of Present Illness: Patient is a 64 y/o male with a pmh of type 2 DM, hypertension, CAD, and neuropathy.  He recently underwent surgical repair of Charcot deformity with external fixator stabilization to his left foot related to cellulitis.  He tolerated the surgical procedure well.  He will need IV antibiotic therapy through 23 due to bone/joint infection.  He remains non-weight  bearing of left lower extremity.  He tells me the pain to his left foot remains controlled, however, he does have a torn left rotator cuff and arthritis so his hip and shoulder are the source of his pain.  He tells me he is doing well and he has no other complaints or concerns.  I have reviewed the patients most recent labs and diagnostics independently.  Glucose 124, BUN 15, creatinine 0.89, sodium 138, potassium 4.2, chloride 105, co2 24, wbc 5.25, hgb 9.9, hct 30.1, platelets 156.    Past Medical History:   Diagnosis Date   • Arthritis    • Atrial fibrillation    • Diabetes mellitus    • Diabetic foot ulcer associated with type 2 diabetes mellitus     left great toe   • Hallux malleus of left foot    • Hammer toe    • Hypertension    • MI (myocardial infarction)    • Neuropathy in diabetes    • Onychomycosis    • Presence of biventricular cardiac pacemaker    • Rheumatoid arthritis       Past Surgical History:   Procedure Laterality Date   • AMPUTATION DIGIT Right 3/5/2017    Procedure: RIGHT AMPUTATION TRANSMETATRASAL , RECESSION GASTROCNEMOUS;  Surgeon: Marco A Thompson DPM;  Location: [facility];  Service:    • CARDIAC DEFIBRILLATOR PLACEMENT  2010, 2016   • CARPAL TUNNEL RELEASE  2015    elbow and wrist left arm   • FOOT IRRIGATION, DEBRIDEMENT AND REPAIR Left 3/7/2018    Procedure: FOOT IRRIGATION, DEBRIDEMENT AND REPAIR;  Surgeon: Marco A Thompson DPM;  Location: [facility];  Service:    • FOOT IRRIGATION, DEBRIDEMENT AND REPAIR Left 3/10/2018    Procedure: FOOT IRRIGATION, DEBRIDEMENT AND REPAIR;  Surgeon: Marco A Thompson DPM;  Location: [facility];  Service: Podiatry   • FOOT WOUND CLOSURE Right 3/2/2017    Procedure: INCISION AND DRAINAGE, RIGHT FOOT;  Surgeon: Tung Rosenthal DPM;  Location: [facility];  Service:    • HAMMER TOE REPAIR Left 2/15/2018    Procedure: HAMMERTOE CORRECTION LEFT SECOND, THIRD AND FOURTH TOES AND HALLUX INTERPHALANGEAL JOINT ARTHRODESIS LEFT FOOT (Micro Asnis, 4.0 & 5.0 Asnis)       (c-arm);  Surgeon: Marco A Thompson DPM;  Location: [facility];  Service:    • HARDWARE REMOVAL Left 3/5/2018    Procedure: ANKLE/FOOT HARDWARE REMOVAL;  Surgeon: Marco A Thompson DPM;  Location: [facility] OR;  Service:    • INCISION AND DRAINAGE LEG Left 3/5/2018    Procedure: INCISION AND DRAINAGE LOWER EXTREMITY;  Surgeon: Marco A Thompson DPM;  Location: [facility] OR;  Service:    • PLANTAR FASCIA RELEASE Left 11/21/2019    Procedure: PLANTAR PLANING LEFT FOOT AND ALL OTHER INDICATED PROCEDURES;  Surgeon: Marco A Thompson DPM;  Location: [facility];  Service: Podiatry   • TOE AMPUTATION Right 2016   • TONSILECTOMY, ADENOIDECTOMY, BILATERAL MYRINGOTOMY AND TUBES      age 25      Family History   Problem Relation Age of Onset   • Diabetes Father    • Stroke Father    • No Known Problems Maternal Grandmother    • No Known Problems Maternal Grandfather    • No Known Problems Paternal Grandmother    • No Known Problems Paternal Grandfather    • No Known Problems Daughter    • No Known Problems Daughter    • No Known Problems Son    • Heart disease Other    • Hypertension Other       Social History     Socioeconomic History   • Marital status:    Tobacco Use   • Smoking status: Never   • Smokeless tobacco: Never   Vaping Use   • Vaping Use: Never used   Substance and Sexual Activity   • Alcohol use: Yes     Comment: social   • Drug use: No   • Sexual activity: Defer      Allergies   Allergen Reactions   • Januvia [Sitagliptin] Hives   • Lipitor [Atorvastatin] Other (See Comments)     Muscle Cramps...   • Shellfish Allergy Other (See Comments)   • Sulfa Antibiotics Rash      Prior to Admission medications    Medication Sig Start Date End Date Taking? Authorizing Provider   ascorbic acid (VITAMIN C) 1000 MG tablet Take 1 tablet by mouth Every Night. 7/19/21   Provider, MD Herve   Bacillus Coagulans-Inulin (Probiotic) 1-250 BILLION-MG capsule Take 1 capsule by mouth 2 (Two) Times a Day. 1/27/23   Lito Rico,  WALTER   Blood Glucose Monitoring Suppl (ONE TOUCH ULTRA 2) w/Device kit  10/18/21   Herve Cr MD   Cholecalciferol 125 MCG (5000 UT) tablet Take 1 tablet by mouth Every Night. 7/19/21   Herve Cr MD   clindamycin (CLEOCIN) 300 MG capsule Take 1 capsule by mouth 3 (Three) Times a Day. 4/5/23   Lito Rico APRN   dronedarone (MULTAQ) 400 MG tablet Take 1 tablet by mouth Every Night.    Herve Cr MD   fexofenadine (ALLEGRA) 180 MG tablet Take 1 tablet by mouth Every Night. 7/19/21   Herve Cr MD   fluticasone (FLONASE) 50 MCG/ACT nasal spray 2 sprays into the nostril(s) as directed by provider As Needed for Rhinitis.    Herve Cr MD   furosemide (Lasix) 20 MG tablet Take 1 tablet by mouth Daily. 4/11/23   Lito Rico APRN   glimepiride (AMARYL) 4 MG tablet Take 1 tablet by mouth Every Night. 7/19/21   Herve Cr MD   losartan (COZAAR) 25 MG tablet Take 1 tablet by mouth Daily. 7/19/21   Herve Cr MD   magnesium oxide (MAG-OX) 400 MG tablet Take 1 tablet by mouth 2 (Two) Times a Day. 11/25/21   Herve Cr MD   metFORMIN (GLUCOPHAGE) 1000 MG tablet Take 1 tablet by mouth 2 (Two) Times a Day With Meals. 7/19/21   Herve Cr MD   metoprolol succinate XL (TOPROL-XL) 25 MG 24 hr tablet Take 1 tablet by mouth 2 (Two) Times a Day. 0.5 tablet    Herve Cr MD   multivitamin (THERAGRAN) tablet tablet Take  by mouth Every Night.    Herve Cr MD   ONE TOUCH ULTRA TEST test strip USE TO TEST BLOOD SUGAR THREE TIMES A DAY 8/20/16   Herve Cr MD   vancomycin 1750 mg/500 mL 0.9% NS IVPB ([facility]) Infuse 500 mL into a venous catheter Every 12 (Twelve) Hours for 70 doses. Indications: Bone and/or Joint Infection 4/21/23 5/26/23  Marco A Thompson DPM   Zinc 50 MG tablet Take 1 tablet by mouth Every Night.    Herve Cr MD     Current Medications:   [START ON 4/24/2023] !Vancomycin  Level Draw Needed, , Does not apply, Once  ascorbic acid, 1,000 mg, Oral, Nightly  [START ON 4/22/2023] cetirizine, 10 mg, Oral, Daily  vitamin D3, 5,000 Units, Oral, Nightly  docusate sodium, 100 mg, Oral, BID  dronedarone, 400 mg, Oral, Nightly  [START ON 4/22/2023] glipizide, 10 mg, Oral, QAM AC  heparin (porcine), 5,000 Units, Subcutaneous, Q8H  Insulin Aspart, 2-12 Units, Subcutaneous, 4x Daily With Meals & Nightly  [START ON 4/22/2023] losartan, 25 mg, Oral, Daily  magnesium oxide, 400 mg, Oral, BID  metFORMIN, 1,000 mg, Oral, BID With Meals  metoprolol succinate XL, 25 mg, Oral, BID  senna-docusate sodium, 2 tablet, Oral, BID  sodium chloride, 3 mL, Intravenous, Q12H  vancomycin, 1,750 mg, Intravenous, Q12H       •  acetaminophen  •  bisacodyl  •  calcium carbonate  •  fluticasone  •  hydrALAZINE  •  muscle rub  •  ondansetron  •  oxyCODONE-acetaminophen  •  Pharmacy to dose vancomycin  •  simethicone  •  sodium chloride   Pharmacy to dose vancomycin,        Review of Systems:    Review of Systems   Constitutional: Positive for activity change. Negative for chills and fever.   HENT: Negative for postnasal drip and tinnitus.    Respiratory: Negative for apnea, chest tightness and shortness of breath.    Cardiovascular: Negative for chest pain and palpitations.   Gastrointestinal: Negative for abdominal distention, nausea and vomiting.   Musculoskeletal: Positive for arthralgias and myalgias.   Skin: Positive for wound.   Neurological: Negative for tremors and seizures.   Psychiatric/Behavioral: Negative for agitation, confusion and hallucinations.       Objective   Vital Signs  Temp: 98.4 F         Heart Rate: 71         Resp: 22          Blood Pressure: 146/79         Pulse Ox: 98 %    Physical Exam:   Physical Exam  Vitals reviewed.   Constitutional:       Appearance: Normal appearance. He is not ill-appearing.   HENT:      Head: Normocephalic and atraumatic.      Mouth/Throat:      Mouth: Mucous membranes  are moist.      Pharynx: Oropharynx is clear.   Eyes:      Extraocular Movements: Extraocular movements intact.      Pupils: Pupils are equal, round, and reactive to light.   Neck:      Vascular: No carotid bruit.   Cardiovascular:      Rate and Rhythm: Normal rate and regular rhythm.      Pulses: Normal pulses.      Heart sounds: Normal heart sounds. No murmur heard.    No gallop.   Pulmonary:      Effort: Pulmonary effort is normal. No respiratory distress.      Breath sounds: Normal breath sounds. No rales.   Abdominal:      General: Bowel sounds are normal. There is no distension.      Palpations: Abdomen is soft.      Tenderness: There is no guarding.   Musculoskeletal:         General: Normal range of motion.      Cervical back: Normal range of motion and neck supple.      Right lower leg: Edema present.      Left lower leg: Edema present.   Skin:     General: Skin is warm and dry.      Capillary Refill: Capillary refill takes less than 2 seconds.      Findings: Bruising present.      Comments: S/P L surgical repair Charcot foot with external fixator.  Wound vac in place   Neurological:      General: No focal deficit present.      Mental Status: He is alert and oriented to person, place, and time.   Psychiatric:         Mood and Affect: Mood normal.         Behavior: Behavior normal.         Thought Content: Thought content normal.         Judgment: Judgment normal.       Results Review:    I have personally reviewed current lab, radiology, and diagnostic data and agree with results.  Results from last 7 days   Lab Units 04/21/23  0530 04/20/23  0617 04/19/23  0457 04/18/23  1250 04/17/23  0422 04/15/23  0611   SODIUM mmol/L 138 136 137 137  --  138   POTASSIUM mmol/L 4.2 4.0 4.1 4.3  --  4.3   CHLORIDE mmol/L 105 103 105 102  --  103   CO2 mmol/L 24.0 24.0 22.0 23.0  --  26.0   BUN mg/dL 15 15 18 20  --  14   CREATININE mg/dL 0.89 0.94 0.99 1.05 0.90 0.94   GLUCOSE mg/dL 124* 114* 100* 103*  --  147*    CALCIUM mg/dL 8.5* 8.6 8.5* 9.1  --  8.3*         Results from last 7 days   Lab Units 04/21/23  0530 04/20/23  0617 04/19/23  0457 04/18/23  1251 04/15/23  0611   WBC 10*3/mm3 5.25 4.90 5.15 5.18 5.99   HEMOGLOBIN g/dL 9.9* 10.3* 9.5* 11.4* 10.2*   HEMATOCRIT % 30.1* 31.7* 27.9* 34.7* 31.3*   PLATELETS 10*3/mm3 156 155 136* 181 115*     Lab Results   Component Value Date    CKTOTAL 167 11/19/2014    CKMB 2.1 11/19/2014    TROPONINI <0.012 11/19/2014     CO2   Date Value Ref Range Status   04/21/2023 24.0 22.0 - 29.0 mmol/L Final         Imaging Results (Most Recent)     None        No results found for: ACANTHNAEG, AFBCX, BPERTUSSISCX, BLOODCX  No results found for: BCIDPCR, CXREFLEX, CSFCX, CULTURETIS  No results found for: CULTURES, HSVCX, URCX  No results found for: EYECULTURE, GCCX, HSVCULTURE, LABHSV  No results found for: LEGIONELLA, MRSACX, MUMPSCX, MYCOPLASCX  No results found for: NOCARDIACX, STOOLCX  No results found for: THROATCX, UNSTIMCULT, URINECX, CULTURE, VZVCULTUR  No results found for: VIRALCULTU, WOUNDCX  Dietary Orders (From admission, onward)     Start     Ordered    04/21/23 1334  Diet: Diabetic Diets; Consistent Carbohydrate; Texture: Regular Texture (IDDSI 7); Fluid Consistency: Thin (IDDSI 0)  Diet Effective Now        References:    Diet Order Crosswalk   Question Answer Comment   Diets: Diabetic Diets    Diabetic Diet: Consistent Carbohydrate    Texture: Regular Texture (IDDSI 7)    Fluid Consistency: Thin (IDDSI 0)        04/21/23 1333                Total Time Spent: 47 minutes.  History, physical exam, assessment and plan may have been partly or fully copied from before, but  changes made to the copied record to reflect care on the date of service. Part of the lab and imaging  reports auto populated and corrected. Some of this note may be an electronic transcription of spoken  language to printed text. This may permit erroneous, or at times, nonsensical words or phrases to  be  inadvertently transcribed. Although I have reviewed the note for such errors, some may still exist.      This document has been electronically signed by Charlie Persaud MD on April 21, 2023 16:08 CDT

## 2023-04-21 NOTE — PROGRESS NOTES
Pharmacokinetics by Pharmacy - Vancomycin    Emre Lisa is a 63 y.o. male receiving vancomycin 1750mg IV Q12H day 9 for bone and/or joint infection  Patient is also receiving ampicillin-sulbactam    Objective:  [Ht: 182.9 cm ; Wt: 168 kg]     WBC   Date Value Ref Range Status   04/21/2023 5.25 3.40 - 10.80 10*3/mm3 Final   04/20/2023 4.90 3.40 - 10.80 10*3/mm3 Final   04/19/2023 5.15 3.40 - 10.80 10*3/mm3 Final    No results found for: CRP, LACTATE   Temp Readings from Last 1 Encounters:   04/21/23 98.6 °F (37 °C)     Estimated Creatinine Clearance: 140.5 mL/min (by C-G formula based on SCr of 0.89 mg/dL).   Creatinine   Date Value Ref Range Status   04/21/2023 0.89 0.76 - 1.27 mg/dL Final   04/20/2023 0.94 0.76 - 1.27 mg/dL Final   04/19/2023 0.99 0.76 - 1.27 mg/dL Final       No results found for: VANCOPEAK, VANCOTROUGH, VANCORANDOM    Culture Results:  Microbiology Results (last 10 days)     ** No results found for the last 240 hours. **        No results found for: RESPCX    Assessment:    WBC 5.25, WNL   Scr 0.89, WNL   Patient afebrile     Transferred to LTACH today. No new cultures. Levels WNL on 4/17/23. Vancomycin trough and AUC goals 10-20 and 400-600, respectively.     Plan:  1. Continue vancomycin 1750mg IV Q12H. Consult ends on 5/26/23.  2. Vancomycin peak on 4/24 at 0615 and vancomycin trough on 4/24 at 1445.  3. Pharmacy will monitor renal function and adjust dose accordingly.    Eloy Mendoza [facility]   04/21/23 14:49 CDT

## 2023-04-21 NOTE — PLAN OF CARE
Goal Outcome Evaluation:  Plan of Care Reviewed With: patient           Outcome Evaluation: VSS. Pt given PRN Tylenol for pain once this shift. No new complaints.

## 2023-04-22 ENCOUNTER — OUTSIDE FACILITY SERVICE (OUTPATIENT)
Dept: PULMONOLOGY | Facility: CLINIC | Age: 63
End: 2023-04-22
Payer: MEDICARE

## 2023-04-22 LAB
GLUCOSE BLDC GLUCOMTR-MCNC: 113 MG/DL (ref 70–130)
GLUCOSE BLDC GLUCOMTR-MCNC: 116 MG/DL (ref 70–130)
GLUCOSE BLDC GLUCOMTR-MCNC: 128 MG/DL (ref 70–130)
GLUCOSE BLDC GLUCOMTR-MCNC: 141 MG/DL (ref 70–130)

## 2023-04-22 PROCEDURE — 97162 PT EVAL MOD COMPLEX 30 MIN: CPT

## 2023-04-22 PROCEDURE — 25010000002 VANCOMYCIN 10 G RECONSTITUTED SOLUTION: Performed by: INTERNAL MEDICINE

## 2023-04-22 PROCEDURE — 82962 GLUCOSE BLOOD TEST: CPT

## 2023-04-22 PROCEDURE — 25010000002 HEPARIN (PORCINE) PER 1000 UNITS: Performed by: INTERNAL MEDICINE

## 2023-04-22 PROCEDURE — 97166 OT EVAL MOD COMPLEX 45 MIN: CPT

## 2023-04-22 NOTE — ACP (ADVANCE CARE PLANNING)
[facility] @ [facility]  INPATIENT PROGRESS NOTE    PATIENT NAME: Emre Lisa      PHYSICIAN: Charlie Persaud MD  : 1960        MRN: [id number]  Patient Care Team:  Jamaal Bravo MD as PCP - Marco A Chauhan DPM as Consulting Physician (Podiatry)    Chief Complaint:  Continued antibiotic therapy and medical management  History of Present Illness: Patient is a 62 y/o male with a pmh of type 2 DM, hypertension, CAD, and neuropathy.  He recently underwent surgical repair of Charcot deformity with external fixator stabilization to his left foot related to cellulitis.  He tolerated the surgical procedure well.  He will need IV antibiotic therapy through 23 due to bone/joint infection.  He remains non-weight bearing of left lower extremity.  He tells me the pain to his left foot remains controlled, however, he does have a torn left rotator cuff and arthritis so his hip and shoulder are the source of his pain.  He tells me he is doing well and he has no other complaints or concerns.  I have reviewed the patients most recent labs and diagnostics independently.  Glucose 124, BUN 15, creatinine 0.89, sodium 138, potassium 4.2, chloride 105, co2 24, wbc 5.25, hgb 9.9, hct 30.1, platelets 156.       Assessment      Cellulitis of left foot  Charcot's joint of left foot  Type 2 DM  Arthritis  A-fib  Hypertension  Rheumatoid arthritis  DVT & GI prophylaxis     DVT Prophylaxis: Heparin  GI Prophylaxis: Protonix  Code Status: Full    Plan     -Stable at time of exam.  -We will administer all medications from previous hospitalization.  -We will follow podiatry recommendations.  -Continue IV antibiotic therapy as before.  -Pharmacy to dose vancomycin.  -Non-weight bearing to left lower extremity.  -Therapy to evaluate and treat.  -Strongly recommend out of bed daily.  -Monitor I&O.  -Telemetry.  -We will monitor FSBS and administer insulin coverage  as ordered.  -DVT and GI prophylaxis in place.  -We'll continue monitoring patient in hospital setting and treat patient as course dictates.  -Please review orders for detailed plan of care.  -For Acute and Chronic medical condition we will continue medications described below in medication section which have been reviewed and we will continue unless changed in plan of care.  -Laboratory and diagnostic studies have been independently reviewed and the reports are reviewed as documented below.    Subjective   Interval History:   Patient Complaints:   Patient seen and examined. He is up to bedside commode with assistance at this time. No overnight events noted. No complaints at this time.  History taken from: Patient, patient's chart.    Review of Systems:    Review of Systems    Objective   Vital Signs  Temp: 98.2 F         Heart Rate: 88         Resp: 18          Blood Pressure: 133/71         Pulse Ox: 95 %    Physical Exam:   Physical Exam  Results Review:       Results from last 7 days   Lab Units 04/21/23  0530 04/20/23  0617 04/19/23  0457 04/18/23  1250 04/17/23  0422   SODIUM mmol/L 138 136 137 137  --    POTASSIUM mmol/L 4.2 4.0 4.1 4.3  --    CHLORIDE mmol/L 105 103 105 102  --    CO2 mmol/L 24.0 24.0 22.0 23.0  --    BUN mg/dL 15 15 18 20  --    CREATININE mg/dL 0.89 0.94 0.99 1.05 0.90   GLUCOSE mg/dL 124* 114* 100* 103*  --    CALCIUM mg/dL 8.5* 8.6 8.5* 9.1  --          Results from last 7 days   Lab Units 04/21/23  0530 04/20/23  0617 04/19/23  0457 04/18/23  1251   WBC 10*3/mm3 5.25 4.90 5.15 5.18   HEMOGLOBIN g/dL 9.9* 10.3* 9.5* 11.4*   HEMATOCRIT % 30.1* 31.7* 27.9* 34.7*   PLATELETS 10*3/mm3 156 155 136* 181     Lab Results   Component Value Date    CKTOTAL 167 11/19/2014    CKMB 2.1 11/19/2014    TROPONINI <0.012 11/19/2014     CO2   Date Value Ref Range Status   04/21/2023 24.0 22.0 - 29.0 mmol/L Final         Imaging Results (Most Recent)     None         No results found for: ACANTHNAEG, AFBCX,  BPERTUSSISCX, BLOODCX  No results found for: BCIDPCR, CXREFLEX, CSFCX, CULTURETIS  No results found for: CULTURES, HSVCX, URCX  No results found for: EYECULTURE, GCCX, HSVCULTURE, LABHSV  No results found for: LEGIONELLA, MRSACX, MUMPSCX, MYCOPLASCX  No results found for: NOCARDIACX, STOOLCX  No results found for: THROATCX, UNSTIMCULT, URINECX, CULTURE, VZVCULTUR  No results found for: VIRALCULTU, WOUNDCX  Medication Reviewed and Will Continue Unless Documented in Plan:   ascorbic acid, 1,000 mg, Oral, Nightly  cetirizine, 10 mg, Oral, Daily  vitamin D3, 5,000 Units, Oral, Nightly  docusate sodium, 100 mg, Oral, BID  dronedarone, 400 mg, Oral, Nightly  glipizide, 10 mg, Oral, QAM AC  heparin (porcine), 5,000 Units, Subcutaneous, Q8H  Insulin Aspart, 2-12 Units, Subcutaneous, 4x Daily With Meals & Nightly  losartan, 25 mg, Oral, Daily  magnesium oxide, 400 mg, Oral, BID  metFORMIN, 1,000 mg, Oral, BID With Meals  metoprolol succinate XL, 25 mg, Oral, BID  senna-docusate sodium, 2 tablet, Oral, BID  sodium chloride, 3 mL, Intravenous, Q12H  vancomycin, 1,750 mg, Intravenous, Q12H      Pharmacy to dose vancomycin,       •  acetaminophen  •  bisacodyl  •  calcium carbonate  •  fluticasone  •  hydrALAZINE  •  muscle rub  •  ondansetron  •  oxyCODONE-acetaminophen  •  Pharmacy to dose vancomycin  •  simethicone  •  sodium chloride  Pharmacy to dose vancomycin,        Dietary Orders (From admission, onward)     Start     Ordered    04/21/23 1334  Diet: Diabetic Diets; Consistent Carbohydrate; Texture: Regular Texture (IDDSI 7); Fluid Consistency: Thin (IDDSI 0)  Diet Effective Now        References:    Diet Order Crosswalk   Question Answer Comment   Diets: Diabetic Diets    Diabetic Diet: Consistent Carbohydrate    Texture: Regular Texture (IDDSI 7)    Fluid Consistency: Thin (IDDSI 0)        04/21/23 1333              History, physical exam, assessment and plan may have been partly or fully copied from before,  but  changes made to the copied record to reflect care on the date of service. Part of the lab and imaging  reports auto populated and corrected. Some of this note may be an electronic transcription of spoken  language to printed text. This may permit erroneous, or at times, nonsensical words or phrases to be  inadvertently transcribed. Although I have reviewed the note for such errors, some may still exist.      This document has been electronically signed by Charlie Persaud MD on April 22, 2023 08:51 CDT

## 2023-04-22 NOTE — PROGRESS NOTES
Pharmacokinetics by Pharmacy - Vancomycin    Emre Lisa is a 63 y.o. male receiving vancomycin 1750mg IV Q12H day 10 for bone and/or joint infection  Patient is also receiving ampicillin-sulbactam.    Objective:  [Ht: 182.9 cm; Wt: 168 kg]     WBC   Date Value Ref Range Status   04/21/2023 5.25 3.40 - 10.80 10*3/mm3 Final   04/20/2023 4.90 3.40 - 10.80 10*3/mm3 Final    No results found for: CRP, LACTATE   Temp Readings from Last 1 Encounters:   04/21/23 98.6 °F (37 °C)     Estimated Creatinine Clearance: 140.5 mL/min (by C-G formula based on SCr of 0.89 mg/dL).   Creatinine   Date Value Ref Range Status   04/21/2023 0.89 0.76 - 1.27 mg/dL Final   04/20/2023 0.94 0.76 - 1.27 mg/dL Final       No results found for: VANCOPEAK, VANCOTROUGH, VANCORANDOM    Culture Results:  Microbiology Results (last 10 days)     ** No results found for the last 240 hours. **        No results found for: RESPCX    Assessment:    No labs today. Will continue to monitor.  No new cultures.     Vancomycin levels WNL on 4/17/23. Patient transferred to LTACH on 4/21. Vancomycin trough and AUC goals 10-20 and 400-600, respectively.     Plan:  1. Continue vancomycin 1750mg IV Q12H. Consult ends 5/26/23.  2. Timing had to be adjusted yesterday. Vancomycin peak on 4/23 at 1900 and trough on 4/24 at 0330.   3. Pharmacy will monitor renal function and adjust dose accordingly.      Eloy Mendoza [facility]   04/22/23 10:46 CDT

## 2023-04-23 ENCOUNTER — OUTSIDE FACILITY SERVICE (OUTPATIENT)
Dept: PULMONOLOGY | Facility: CLINIC | Age: 63
End: 2023-04-23
Payer: MEDICARE

## 2023-04-23 ENCOUNTER — APPOINTMENT (OUTPATIENT)
Dept: GENERAL RADIOLOGY | Facility: HOSPITAL | Age: 63
End: 2023-04-23
Payer: COMMERCIAL

## 2023-04-23 ENCOUNTER — APPOINTMENT (OUTPATIENT)
Dept: CARDIOLOGY | Facility: HOSPITAL | Age: 63
End: 2023-04-23
Payer: COMMERCIAL

## 2023-04-23 LAB
ALBUMIN SERPL-MCNC: 3.7 G/DL (ref 3.5–5.2)
ALBUMIN/GLOB SERPL: 1.4 G/DL
ALP SERPL-CCNC: 76 U/L (ref 39–117)
ALT SERPL W P-5'-P-CCNC: 66 U/L (ref 1–41)
ANION GAP SERPL CALCULATED.3IONS-SCNC: 11 MMOL/L (ref 5–15)
AST SERPL-CCNC: 42 U/L (ref 1–40)
BASOPHILS # BLD AUTO: 0.05 10*3/MM3 (ref 0–0.2)
BASOPHILS NFR BLD AUTO: 0.8 % (ref 0–1.5)
BILIRUB SERPL-MCNC: 0.5 MG/DL (ref 0–1.2)
BUN SERPL-MCNC: 18 MG/DL (ref 8–23)
BUN/CREAT SERPL: 16.2 (ref 7–25)
CALCIUM SPEC-SCNC: 8.7 MG/DL (ref 8.6–10.5)
CHLORIDE SERPL-SCNC: 102 MMOL/L (ref 98–107)
CK MB SERPL-CCNC: 2.16 NG/ML
CK SERPL-CCNC: 119 U/L (ref 20–200)
CO2 SERPL-SCNC: 23 MMOL/L (ref 22–29)
CREAT SERPL-MCNC: 1.11 MG/DL (ref 0.76–1.27)
DEPRECATED RDW RBC AUTO: 47.1 FL (ref 37–54)
EGFRCR SERPLBLD CKD-EPI 2021: 75.5 ML/MIN/1.73
EOSINOPHIL # BLD AUTO: 0.08 10*3/MM3 (ref 0–0.4)
EOSINOPHIL NFR BLD AUTO: 1.3 % (ref 0.3–6.2)
ERYTHROCYTE [DISTWIDTH] IN BLOOD BY AUTOMATED COUNT: 14.1 % (ref 12.3–15.4)
GEN 5 2HR TROPONIN T REFLEX: 64 NG/L
GLOBULIN UR ELPH-MCNC: 2.7 GM/DL
GLUCOSE BLDC GLUCOMTR-MCNC: 139 MG/DL (ref 70–130)
GLUCOSE BLDC GLUCOMTR-MCNC: 147 MG/DL (ref 70–130)
GLUCOSE BLDC GLUCOMTR-MCNC: 180 MG/DL (ref 70–130)
GLUCOSE BLDC GLUCOMTR-MCNC: 209 MG/DL (ref 70–130)
GLUCOSE SERPL-MCNC: 193 MG/DL (ref 65–99)
HCT VFR BLD AUTO: 30.8 % (ref 37.5–51)
HGB BLD-MCNC: 10.2 G/DL (ref 13–17.7)
IMM GRANULOCYTES # BLD AUTO: 0.11 10*3/MM3 (ref 0–0.05)
IMM GRANULOCYTES NFR BLD AUTO: 1.7 % (ref 0–0.5)
LYMPHOCYTES # BLD AUTO: 1 10*3/MM3 (ref 0.7–3.1)
LYMPHOCYTES NFR BLD AUTO: 15.8 % (ref 19.6–45.3)
MAGNESIUM SERPL-MCNC: 1.9 MG/DL (ref 1.6–2.4)
MCH RBC QN AUTO: 30.4 PG (ref 26.6–33)
MCHC RBC AUTO-ENTMCNC: 33.1 G/DL (ref 31.5–35.7)
MCV RBC AUTO: 91.9 FL (ref 79–97)
MONOCYTES # BLD AUTO: 0.85 10*3/MM3 (ref 0.1–0.9)
MONOCYTES NFR BLD AUTO: 13.4 % (ref 5–12)
NEUTROPHILS NFR BLD AUTO: 4.24 10*3/MM3 (ref 1.7–7)
NEUTROPHILS NFR BLD AUTO: 67 % (ref 42.7–76)
NRBC BLD AUTO-RTO: 0.5 /100 WBC (ref 0–0.2)
PLATELET # BLD AUTO: 155 10*3/MM3 (ref 140–450)
PMV BLD AUTO: 10.1 FL (ref 6–12)
POTASSIUM SERPL-SCNC: 4.6 MMOL/L (ref 3.5–5.2)
PROT SERPL-MCNC: 6.4 G/DL (ref 6–8.5)
RBC # BLD AUTO: 3.35 10*6/MM3 (ref 4.14–5.8)
SODIUM SERPL-SCNC: 136 MMOL/L (ref 136–145)
TROPONIN T DELTA: 9 NG/L
TROPONIN T SERPL HS-MCNC: 55 NG/L
VANCOMYCIN PEAK SERPL-MCNC: 39.6 MCG/ML (ref 20–40)
WBC NRBC COR # BLD: 6.33 10*3/MM3 (ref 3.4–10.8)
WHOLE BLOOD HOLD SPECIMEN: NORMAL

## 2023-04-23 PROCEDURE — 25010000002 FUROSEMIDE PER 20 MG: Performed by: INTERNAL MEDICINE

## 2023-04-23 PROCEDURE — 71045 X-RAY EXAM CHEST 1 VIEW: CPT

## 2023-04-23 PROCEDURE — 93306 TTE W/DOPPLER COMPLETE: CPT

## 2023-04-23 PROCEDURE — 25010000002 HEPARIN (PORCINE) PER 1000 UNITS: Performed by: INTERNAL MEDICINE

## 2023-04-23 PROCEDURE — 93005 ELECTROCARDIOGRAM TRACING: CPT | Performed by: INTERNAL MEDICINE

## 2023-04-23 PROCEDURE — 93010 ELECTROCARDIOGRAM REPORT: CPT | Performed by: INTERNAL MEDICINE

## 2023-04-23 PROCEDURE — 82550 ASSAY OF CK (CPK): CPT | Performed by: INTERNAL MEDICINE

## 2023-04-23 PROCEDURE — 84484 ASSAY OF TROPONIN QUANT: CPT | Performed by: INTERNAL MEDICINE

## 2023-04-23 PROCEDURE — 80202 ASSAY OF VANCOMYCIN: CPT | Performed by: INTERNAL MEDICINE

## 2023-04-23 PROCEDURE — 85025 COMPLETE CBC W/AUTO DIFF WBC: CPT | Performed by: INTERNAL MEDICINE

## 2023-04-23 PROCEDURE — 25010000002 VANCOMYCIN 10 G RECONSTITUTED SOLUTION: Performed by: INTERNAL MEDICINE

## 2023-04-23 PROCEDURE — 82948 REAGENT STRIP/BLOOD GLUCOSE: CPT

## 2023-04-23 PROCEDURE — 80053 COMPREHEN METABOLIC PANEL: CPT | Performed by: INTERNAL MEDICINE

## 2023-04-23 PROCEDURE — 83735 ASSAY OF MAGNESIUM: CPT | Performed by: INTERNAL MEDICINE

## 2023-04-23 PROCEDURE — 82962 GLUCOSE BLOOD TEST: CPT

## 2023-04-23 PROCEDURE — 82553 CREATINE MB FRACTION: CPT | Performed by: INTERNAL MEDICINE

## 2023-04-23 RX ORDER — NITROGLYCERIN 0.4 MG/1
TABLET SUBLINGUAL
Status: DISPENSED
Start: 2023-04-23 | End: 2023-04-23

## 2023-04-23 RX ORDER — FUROSEMIDE 10 MG/ML
20 INJECTION INTRAMUSCULAR; INTRAVENOUS ONCE
Status: DISCONTINUED | OUTPATIENT
Start: 2023-04-23 | End: 2023-04-25

## 2023-04-23 RX ORDER — NITROGLYCERIN 0.4 MG/1
0.4 TABLET SUBLINGUAL ONCE
Status: DISCONTINUED | OUTPATIENT
Start: 2023-04-23 | End: 2023-04-23

## 2023-04-23 RX ORDER — NITROGLYCERIN 0.4 MG/1
0.4 TABLET SUBLINGUAL
Status: ACTIVE | OUTPATIENT
Start: 2023-04-23 | End: 2023-04-23

## 2023-04-23 NOTE — ACP (ADVANCE CARE PLANNING)
[facility] @ [facility]  INPATIENT PROGRESS NOTE    PATIENT NAME: Emre Lisa      PHYSICIAN: Charlie Persaud MD  : 1960        MRN: [id number]  Patient Care Team:  Jamaal Bravo MD as PCP - Marco A Chauhan DPM as Consulting Physician (Podiatry)    Chief Complaint:  Continued antibiotic therapy and medical management  History of Present Illness: Patient is a 64 y/o male with a pmh of type 2 DM, hypertension, CAD, and neuropathy.  He recently underwent surgical repair of Charcot deformity with external fixator stabilization to his left foot related to cellulitis.  He tolerated the surgical procedure well.  He will need IV antibiotic therapy through 23 due to bone/joint infection.  He remains non-weight bearing of left lower extremity.  He tells me the pain to his left foot remains controlled, however, he does have a torn left rotator cuff and arthritis so his hip and shoulder are the source of his pain.  He tells me he is doing well and he has no other complaints or concerns.  I have reviewed the patients most recent labs and diagnostics independently.  Glucose 124, BUN 15, creatinine 0.89, sodium 138, potassium 4.2, chloride 105, co2 24, wbc 5.25, hgb 9.9, hct 30.1, platelets 156.       Assessment      Cellulitis of left foot  Charcot's joint of left foot  Type 2 DM  Arthritis  A-fib  Hypertension  Rheumatoid arthritis  DVT & GI prophylaxis     DVT Prophylaxis: Heparin  GI Prophylaxis: Protonix  Code Status: Full    Plan     -Stable at time of exam.  -We will administer all medications from previous hospitalization.  -We will follow podiatry recommendations.  -Continue IV antibiotic therapy as before.  -Pharmacy to dose vancomycin.  -Non-weight bearing to left lower extremity.  -Therapy to evaluate and treat.  -Strongly recommend out of bed daily.  -Monitor I&O.  -Telemetry.  -We will monitor FSBS and administer insulin coverage  as ordered.  -Cardiology consult appreciated.  -Echocardiogram ordered, results pending.  -DVT and GI prophylaxis in place.  -We'll continue monitoring patient in hospital setting and treat patient as course dictates.  -Please review orders for detailed plan of care.  -For Acute and Chronic medical condition we will continue medications described below in medication section which have been reviewed and we will continue unless changed in plan of care.  -Laboratory and diagnostic studies have been independently reviewed and the reports are reviewed as documented below.    Subjective   Interval History:   Patient Complaints:   Patient seen and examined. He is up to bedside commode with assistance at this time. No overnight events noted. No complaints at this time.    Patient seen and examined. He is sitting up on the side of his bed eating breakfast with family present. He had some chest pressure overnight and stated he had some shortness of breath. This was with position changes. He had diaphoresis with the chest pressure and shortness of breath. EKG showed sinus tachycardia. Nitro was given and helped with the symptoms. Troponin levels were obtained and elevated. An echocardiogram has been ordered with results pending. Patient has no complaints or needs at this time. He states he feels better today, he is just tired.  History taken from: Patient, patient's chart.    Review of Systems:    Review of Systems   Constitutional: Positive for activity change. Negative for chills and fever.   HENT: Negative for postnasal drip and tinnitus.    Respiratory: Positive for chest tightness and shortness of breath. Negative for apnea.    Cardiovascular: Negative for chest pain and palpitations.   Gastrointestinal: Negative for abdominal distention, nausea and vomiting.   Musculoskeletal: Positive for arthralgias and myalgias.   Skin: Positive for wound.   Neurological: Negative for tremors and seizures.   Psychiatric/Behavioral:  Negative for agitation, confusion and hallucinations.       Objective   Vital Signs  Temp: 97.3 F         Heart Rate: 107         Resp: 18          Blood Pressure: 102/71         Pulse Ox: 95 %    Physical Exam:   Physical Exam  Vitals reviewed.   Constitutional:       Appearance: Normal appearance. He is not ill-appearing.   HENT:      Head: Normocephalic and atraumatic.      Mouth/Throat:      Mouth: Mucous membranes are moist.      Pharynx: Oropharynx is clear.   Eyes:      Extraocular Movements: Extraocular movements intact.      Pupils: Pupils are equal, round, and reactive to light.   Neck:      Vascular: No carotid bruit.   Cardiovascular:      Rate and Rhythm: Regular rhythm. Tachycardia present.      Pulses: Normal pulses.      Heart sounds: Normal heart sounds. No murmur heard.    No gallop.   Pulmonary:      Effort: Pulmonary effort is normal. No respiratory distress.      Breath sounds: Normal breath sounds. No rales.   Abdominal:      General: Bowel sounds are normal. There is no distension.      Palpations: Abdomen is soft.      Tenderness: There is no guarding.   Musculoskeletal:         General: Normal range of motion.      Cervical back: Normal range of motion and neck supple.      Right lower leg: Edema present.      Left lower leg: Edema present.   Skin:     General: Skin is warm and dry.      Capillary Refill: Capillary refill takes less than 2 seconds.      Findings: Bruising present.      Comments: S/P L surgical repair Charcot foot with external fixator.  Wound vac in place   Neurological:      General: No focal deficit present.      Mental Status: He is alert and oriented to person, place, and time.   Psychiatric:         Mood and Affect: Mood normal.         Behavior: Behavior normal.         Thought Content: Thought content normal.         Judgment: Judgment normal.       Results Review:       Results from last 7 days   Lab Units 04/23/23  0644 04/21/23  0530 04/20/23  0617 04/19/23  0457  04/18/23  1250 04/17/23  0422   SODIUM mmol/L 136 138 136 137 137  --    POTASSIUM mmol/L 4.6 4.2 4.0 4.1 4.3  --    CHLORIDE mmol/L 102 105 103 105 102  --    CO2 mmol/L 23.0 24.0 24.0 22.0 23.0  --    BUN mg/dL 18 15 15 18 20  --    CREATININE mg/dL 1.11 0.89 0.94 0.99 1.05 0.90   GLUCOSE mg/dL 193* 124* 114* 100* 103*  --    CALCIUM mg/dL 8.7 8.5* 8.6 8.5* 9.1  --    BILIRUBIN mg/dL 0.5  --   --   --   --   --    ALK PHOS U/L 76  --   --   --   --   --    ALT (SGPT) U/L 66*  --   --   --   --   --    AST (SGOT) U/L 42*  --   --   --   --   --      Results from last 7 days   Lab Units 04/23/23  0644   MAGNESIUM mg/dL 1.9     Results from last 7 days   Lab Units 04/23/23  0644 04/21/23  0530 04/20/23  0617 04/19/23  0457 04/18/23  1251   WBC 10*3/mm3 6.33 5.25 4.90 5.15 5.18   HEMOGLOBIN g/dL 10.2* 9.9* 10.3* 9.5* 11.4*   HEMATOCRIT % 30.8* 30.1* 31.7* 27.9* 34.7*   PLATELETS 10*3/mm3 155 156 155 136* 181     Lab Results   Component Value Date    CKTOTAL 119 04/23/2023    CKMB 2.16 04/23/2023    TROPONINI <0.012 11/19/2014    TROPONINT 64 (C) 04/23/2023     CO2   Date Value Ref Range Status   04/23/2023 23.0 22.0 - 29.0 mmol/L Final         Imaging Results (Most Recent)     None         No results found for: ACANTHNAEG, AFBCX, BPERTUSSISCX, BLOODCX  No results found for: BCIDPCR, CXREFLEX, CSFCX, CULTURETIS  No results found for: CULTURES, HSVCX, URCX  No results found for: EYECULTURE, GCCX, HSVCULTURE, LABHSV  No results found for: LEGIONELLA, MRSACX, MUMPSCX, MYCOPLASCX  No results found for: NOCARDIACX, STOOLCX  No results found for: THROATCX, UNSTIMCULT, URINECX, CULTURE, VZVCULTUR  No results found for: VIRALCULTU, WOUNDCX  Medication Reviewed and Will Continue Unless Documented in Plan:   [START ON 4/24/2023] !Vancomycin Level Draw Needed, , Does not apply, Once  ascorbic acid, 1,000 mg, Oral, Nightly  cetirizine, 10 mg, Oral, Daily  vitamin D3, 5,000 Units, Oral, Nightly  docusate sodium, 100 mg, Oral,  BID  dronedarone, 400 mg, Oral, Nightly  glipizide, 10 mg, Oral, QAM AC  heparin (porcine), 5,000 Units, Subcutaneous, Q8H  Insulin Aspart, 2-12 Units, Subcutaneous, 4x Daily With Meals & Nightly  losartan, 25 mg, Oral, Daily  magnesium oxide, 400 mg, Oral, BID  metFORMIN, 1,000 mg, Oral, BID With Meals  metoprolol succinate XL, 25 mg, Oral, BID  nitroglycerin, , ,   senna-docusate sodium, 2 tablet, Oral, BID  sodium chloride, 3 mL, Intravenous, Q12H  vancomycin, 1,750 mg, Intravenous, Q12H      Pharmacy to dose vancomycin,       •  acetaminophen  •  bisacodyl  •  calcium carbonate  •  fluticasone  •  hydrALAZINE  •  muscle rub  •  ondansetron  •  oxyCODONE-acetaminophen  •  Pharmacy to dose vancomycin  •  simethicone  •  sodium chloride  Pharmacy to dose vancomycin,        Dietary Orders (From admission, onward)     Start     Ordered    04/21/23 1334  Diet: Diabetic Diets; Consistent Carbohydrate; Texture: Regular Texture (IDDSI 7); Fluid Consistency: Thin (IDDSI 0)  Diet Effective Now        References:    Diet Order Crosswalk   Question Answer Comment   Diets: Diabetic Diets    Diabetic Diet: Consistent Carbohydrate    Texture: Regular Texture (IDDSI 7)    Fluid Consistency: Thin (IDDSI 0)        04/21/23 1333              History, physical exam, assessment and plan may have been partly or fully copied from before, but  changes made to the copied record to reflect care on the date of service. Part of the lab and imaging  reports auto populated and corrected. Some of this note may be an electronic transcription of spoken  language to printed text. This may permit erroneous, or at times, nonsensical words or phrases to be  inadvertently transcribed. Although I have reviewed the note for such errors, some may still exist.      This document has been electronically signed by Charlie Persaud MD on April 23, 2023 14:47 CDT

## 2023-04-23 NOTE — PROGRESS NOTES
Pharmacokinetics by Pharmacy - Vancomycin    Emre Lisa is a 63 y.o. male receiving vancomycin 1750 mg IV q12hr (day 10) for bone and/or joint infection.  Patient is also receiving ampicillin/sulbactam.    Objective:    Temp Readings from Last 1 Encounters:   04/21/23 98.6 °F (37 °C)     WBC   Date Value Ref Range Status   04/23/2023 6.33 3.40 - 10.80 10*3/mm3 Final   04/21/2023 5.25 3.40 - 10.80 10*3/mm3 Final    No results found for: CRP, LACTATE   Creatinine   Date Value Ref Range Status   04/23/2023 1.11 0.76 - 1.27 mg/dL Final   04/21/2023 0.89 0.76 - 1.27 mg/dL Final       No results found for: VANCOPEAK, VANCOTROUGH, VANCORANDOM    Culture Results:  Microbiology Results (last 10 days)     ** No results found for the last 240 hours. **        No results found for: RESPCX    [Ht:  ; Wt:  ]   There is no height or weight on file to calculate BMI.  Ideal body weight: 77.6 kg (171 lb 1.2 oz)  Adjusted ideal body weight: 114 kg (250 lb 10.3 oz)      Assessment:  • WBCs WNL  • Creatinine 1.11 mcg/mL and will monitor.   • No cultures  • Vancomycin levels WNL on 4/17.    • AUC and trough goals are 400-600 and 10-20 mcg/mL, respectively.     Plan:  1. Continue Vancomycin 1750 mg IV q12hrs   2. Vancomycin peak and trough ordered for 4/23 @ 1900 and 4/24 @ 0330, respectively. Consulted until 5/26.  3. Pharmacy will monitor renal function and adjust dose accordingly.    Thank you for this consult.     Estiven Cannon, [facility]   04/23/23 11:14 CDT

## 2023-04-24 ENCOUNTER — APPOINTMENT (OUTPATIENT)
Dept: CT IMAGING | Facility: HOSPITAL | Age: 63
End: 2023-04-24
Payer: COMMERCIAL

## 2023-04-24 ENCOUNTER — OUTSIDE FACILITY SERVICE (OUTPATIENT)
Dept: PULMONOLOGY | Facility: CLINIC | Age: 63
End: 2023-04-24
Payer: MEDICARE

## 2023-04-24 LAB
ALBUMIN SERPL-MCNC: 4.1 G/DL (ref 3.5–5.2)
ALBUMIN/GLOB SERPL: 1.4 G/DL
ALP SERPL-CCNC: 85 U/L (ref 39–117)
ALT SERPL W P-5'-P-CCNC: 62 U/L (ref 1–41)
ANION GAP SERPL CALCULATED.3IONS-SCNC: 12 MMOL/L (ref 5–15)
AST SERPL-CCNC: 31 U/L (ref 1–40)
BASOPHILS # BLD AUTO: 0.06 10*3/MM3 (ref 0–0.2)
BASOPHILS NFR BLD AUTO: 1 % (ref 0–1.5)
BH CV ECHO MEAS - ACS: 1.52 CM
BH CV ECHO MEAS - AO MAX PG: 8.5 MMHG
BH CV ECHO MEAS - AO MEAN PG: 4.2 MMHG
BH CV ECHO MEAS - AO ROOT DIAM: 4.1 CM
BH CV ECHO MEAS - AO V2 MAX: 145.4 CM/SEC
BH CV ECHO MEAS - AO V2 VTI: 22.7 CM
BH CV ECHO MEAS - AVA(I,D): 3.2 CM2
BH CV ECHO MEAS - EDV(CUBED): 253.5 ML
BH CV ECHO MEAS - EDV(MOD-SP4): 155 ML
BH CV ECHO MEAS - EF(MOD-SP4): 20 %
BH CV ECHO MEAS - ESV(CUBED): 184.2 ML
BH CV ECHO MEAS - ESV(MOD-SP4): 124 ML
BH CV ECHO MEAS - FS: 10.1 %
BH CV ECHO MEAS - IVS/LVPW: 1.11 CM
BH CV ECHO MEAS - IVSD: 1.42 CM
BH CV ECHO MEAS - LA DIMENSION: 4.1 CM
BH CV ECHO MEAS - LAT PEAK E' VEL: 5 CM/SEC
BH CV ECHO MEAS - LV DIASTOLIC VOL/BSA (35-75): 56 CM2
BH CV ECHO MEAS - LV MASS(C)D: 403.3 GRAMS
BH CV ECHO MEAS - LV MAX PG: 1.83 MMHG
BH CV ECHO MEAS - LV MEAN PG: 0.94 MMHG
BH CV ECHO MEAS - LV SYSTOLIC VOL/BSA (12-30): 44.8 CM2
BH CV ECHO MEAS - LV V1 MAX: 67.6 CM/SEC
BH CV ECHO MEAS - LV V1 VTI: 13.9 CM
BH CV ECHO MEAS - LVIDD: 6.3 CM
BH CV ECHO MEAS - LVIDS: 5.7 CM
BH CV ECHO MEAS - LVOT AREA: 5.1 CM2
BH CV ECHO MEAS - LVOT DIAM: 2.6 CM
BH CV ECHO MEAS - LVPWD: 1.28 CM
BH CV ECHO MEAS - MV A MAX VEL: 74.9 CM/SEC
BH CV ECHO MEAS - MV DEC SLOPE: 703.5 CM/SEC2
BH CV ECHO MEAS - MV E MAX VEL: 47.3 CM/SEC
BH CV ECHO MEAS - MV E/A: 0.63
BH CV ECHO MEAS - MV MAX PG: 3.7 MMHG
BH CV ECHO MEAS - MV MEAN PG: 1.65 MMHG
BH CV ECHO MEAS - MV P1/2T: 21.8 MSEC
BH CV ECHO MEAS - MV V2 VTI: 13.3 CM
BH CV ECHO MEAS - MVA(P1/2T): 10.1 CM2
BH CV ECHO MEAS - MVA(VTI): 5.4 CM2
BH CV ECHO MEAS - PA V2 MAX: 98.9 CM/SEC
BH CV ECHO MEAS - RAP SYSTOLE: 15 MMHG
BH CV ECHO MEAS - RVSP: 25.1 MMHG
BH CV ECHO MEAS - SI(MOD-SP4): 11.2 ML/M2
BH CV ECHO MEAS - SV(LVOT): 71.7 ML
BH CV ECHO MEAS - SV(MOD-SP4): 31 ML
BH CV ECHO MEAS - TR MAX PG: 10.1 MMHG
BH CV ECHO MEAS - TR MAX VEL: 159 CM/SEC
BILIRUB SERPL-MCNC: 0.4 MG/DL (ref 0–1.2)
BUN SERPL-MCNC: 24 MG/DL (ref 8–23)
BUN/CREAT SERPL: 20.5 (ref 7–25)
CALCIUM SPEC-SCNC: 9 MG/DL (ref 8.6–10.5)
CHLORIDE SERPL-SCNC: 99 MMOL/L (ref 98–107)
CO2 SERPL-SCNC: 23 MMOL/L (ref 22–29)
CREAT SERPL-MCNC: 1.17 MG/DL (ref 0.76–1.27)
D-DIMER, QUANTITATIVE (MAD,POW, STR): ABNORMAL NG/ML (FEU) (ref 0–610)
DEPRECATED RDW RBC AUTO: 48.3 FL (ref 37–54)
EGFRCR SERPLBLD CKD-EPI 2021: 70.9 ML/MIN/1.73
EOSINOPHIL # BLD AUTO: 0.09 10*3/MM3 (ref 0–0.4)
EOSINOPHIL NFR BLD AUTO: 1.5 % (ref 0.3–6.2)
ERYTHROCYTE [DISTWIDTH] IN BLOOD BY AUTOMATED COUNT: 14.3 % (ref 12.3–15.4)
GLOBULIN UR ELPH-MCNC: 3 GM/DL
GLUCOSE BLDC GLUCOMTR-MCNC: 164 MG/DL (ref 70–130)
GLUCOSE BLDC GLUCOMTR-MCNC: 165 MG/DL (ref 70–130)
GLUCOSE BLDC GLUCOMTR-MCNC: 167 MG/DL (ref 70–130)
GLUCOSE BLDC GLUCOMTR-MCNC: 179 MG/DL (ref 70–130)
GLUCOSE BLDC GLUCOMTR-MCNC: 190 MG/DL (ref 70–130)
GLUCOSE SERPL-MCNC: 160 MG/DL (ref 65–99)
HCT VFR BLD AUTO: 31 % (ref 37.5–51)
HGB BLD-MCNC: 10.1 G/DL (ref 13–17.7)
IMM GRANULOCYTES # BLD AUTO: 0.07 10*3/MM3 (ref 0–0.05)
IMM GRANULOCYTES NFR BLD AUTO: 1.1 % (ref 0–0.5)
LEFT ATRIUM VOLUME INDEX: 19.1 ML/M2
LYMPHOCYTES # BLD AUTO: 1.74 10*3/MM3 (ref 0.7–3.1)
LYMPHOCYTES NFR BLD AUTO: 28.1 % (ref 19.6–45.3)
MAGNESIUM SERPL-MCNC: 2 MG/DL (ref 1.6–2.4)
MAXIMAL PREDICTED HEART RATE: 159 BPM
MCH RBC QN AUTO: 30.1 PG (ref 26.6–33)
MCHC RBC AUTO-ENTMCNC: 32.6 G/DL (ref 31.5–35.7)
MCV RBC AUTO: 92.5 FL (ref 79–97)
MONOCYTES # BLD AUTO: 0.77 10*3/MM3 (ref 0.1–0.9)
MONOCYTES NFR BLD AUTO: 12.4 % (ref 5–12)
NEUTROPHILS NFR BLD AUTO: 3.47 10*3/MM3 (ref 1.7–7)
NEUTROPHILS NFR BLD AUTO: 55.9 % (ref 42.7–76)
NRBC BLD AUTO-RTO: 0.5 /100 WBC (ref 0–0.2)
PLATELET # BLD AUTO: 143 10*3/MM3 (ref 140–450)
PMV BLD AUTO: 10.3 FL (ref 6–12)
POTASSIUM SERPL-SCNC: 4.3 MMOL/L (ref 3.5–5.2)
PROT SERPL-MCNC: 7.1 G/DL (ref 6–8.5)
QT INTERVAL: 414 MS
QTC INTERVAL: 520 MS
RBC # BLD AUTO: 3.35 10*6/MM3 (ref 4.14–5.8)
SODIUM SERPL-SCNC: 134 MMOL/L (ref 136–145)
STRESS TARGET HR: 135 BPM
TROPONIN T SERPL HS-MCNC: 50 NG/L
VANCOMYCIN TROUGH SERPL-MCNC: 18.4 MCG/ML (ref 5–20)
WBC NRBC COR # BLD: 6.2 10*3/MM3 (ref 3.4–10.8)

## 2023-04-24 PROCEDURE — 25010000002 VANCOMYCIN 10 G RECONSTITUTED SOLUTION: Performed by: INTERNAL MEDICINE

## 2023-04-24 PROCEDURE — 82962 GLUCOSE BLOOD TEST: CPT

## 2023-04-24 PROCEDURE — 85025 COMPLETE CBC W/AUTO DIFF WBC: CPT | Performed by: INTERNAL MEDICINE

## 2023-04-24 PROCEDURE — 80053 COMPREHEN METABOLIC PANEL: CPT | Performed by: INTERNAL MEDICINE

## 2023-04-24 PROCEDURE — 82948 REAGENT STRIP/BLOOD GLUCOSE: CPT

## 2023-04-24 PROCEDURE — 80202 ASSAY OF VANCOMYCIN: CPT | Performed by: INTERNAL MEDICINE

## 2023-04-24 PROCEDURE — 93005 ELECTROCARDIOGRAM TRACING: CPT | Performed by: INTERNAL MEDICINE

## 2023-04-24 PROCEDURE — 25010000002 FUROSEMIDE PER 20 MG

## 2023-04-24 PROCEDURE — 25010000002 ONDANSETRON PER 1 MG: Performed by: INTERNAL MEDICINE

## 2023-04-24 PROCEDURE — 25010000002 HEPARIN (PORCINE) PER 1000 UNITS: Performed by: INTERNAL MEDICINE

## 2023-04-24 PROCEDURE — 84484 ASSAY OF TROPONIN QUANT: CPT | Performed by: INTERNAL MEDICINE

## 2023-04-24 PROCEDURE — 83735 ASSAY OF MAGNESIUM: CPT | Performed by: INTERNAL MEDICINE

## 2023-04-24 PROCEDURE — 85379 FIBRIN DEGRADATION QUANT: CPT | Performed by: INTERNAL MEDICINE

## 2023-04-24 PROCEDURE — 71275 CT ANGIOGRAPHY CHEST: CPT

## 2023-04-24 PROCEDURE — 93010 ELECTROCARDIOGRAM REPORT: CPT | Performed by: INTERNAL MEDICINE

## 2023-04-24 PROCEDURE — 25510000001 IOPAMIDOL PER 1 ML: Performed by: INTERNAL MEDICINE

## 2023-04-24 RX ORDER — SIMETHICONE 80 MG
80 TABLET,CHEWABLE ORAL EVERY 4 HOURS PRN
Status: DISCONTINUED | OUTPATIENT
Start: 2023-04-24 | End: 2023-05-15 | Stop reason: HOSPADM

## 2023-04-24 RX ORDER — FUROSEMIDE 10 MG/ML
40 INJECTION INTRAMUSCULAR; INTRAVENOUS ONCE
Status: DISCONTINUED | OUTPATIENT
Start: 2023-04-24 | End: 2023-04-25

## 2023-04-24 RX ORDER — SODIUM CHLORIDE 9 MG/ML
75 INJECTION, SOLUTION INTRAVENOUS ONCE
Status: DISCONTINUED | OUTPATIENT
Start: 2023-04-24 | End: 2023-04-25

## 2023-04-24 RX ADMIN — IOPAMIDOL 90 ML: 755 INJECTION, SOLUTION INTRAVENOUS at 04:42

## 2023-04-24 NOTE — PROGRESS NOTES
Pharmacokinetics by Pharmacy - Vancomycin    Emre Lisa is a 63 y.o. male receiving vancomycin 1750 mg IV q12hr (day 11) for bone and/or joint infection.  Patient is also receiving ampicillin/sulbactam.    Objective:    Temp Readings from Last 1 Encounters:   04/21/23 98.6 °F (37 °C)     WBC   Date Value Ref Range Status   04/24/2023 6.20 3.40 - 10.80 10*3/mm3 Final   04/23/2023 6.33 3.40 - 10.80 10*3/mm3 Final    No results found for: CRP, LACTATE   Creatinine   Date Value Ref Range Status   04/24/2023 1.17 0.76 - 1.27 mg/dL Final   04/23/2023 1.11 0.76 - 1.27 mg/dL Final       Vancomycin Peak   Date Value Ref Range Status   04/23/2023 39.60 20.00 - 40.00 mcg/mL Final     Vancomycin Trough   Date Value Ref Range Status   04/24/2023 18.40 5.00 - 20.00 mcg/mL Final       Culture Results:  Microbiology Results (last 10 days)     ** No results found for the last 240 hours. **        No results found for: RESPCX    [Ht:  ; Wt:  ]   There is no height or weight on file to calculate BMI.  Ideal body weight: 77.6 kg (171 lb 1.2 oz)  Adjusted ideal body weight: 114 kg (250 lb 10.3 oz)      Assessment:  • WBCs WNL  • Creatinine 1.17 mcg/mL and will monitor.   • No cultures  • Vancomycin peak and trough resulted at 39.6 mcg/mL and 18.4 mcg/mL, respectively. Levels were drawn appropriately. These give a calculated AUC and trough of 725 and 18 mcg/mL, respectively. AUC is above goal and trough is borderline high.  • AUC and trough goals are 400-600 and 10-20 mcg/mL, respectively.     Plan:  1. Reduce Vancomycin to 1250 mg IV q12hrs, new estimated AUC and trough of 496 and 13.6 mcg/mL, respectively.   2. Vancomycin levels when clinically indicated. Consulted until 5/26.  3. Pharmacy will monitor renal function and adjust dose accordingly.    Thank you for this consult.     Estiven Cannon, [facility]   04/24/23 08:57 CDT

## 2023-04-24 NOTE — ACP (ADVANCE CARE PLANNING)
[facility] @ [facility]  INPATIENT PROGRESS NOTE    PATIENT NAME: Emre Lisa      PHYSICIAN: Charlie Persaud MD  : 1960        MRN: [id number]  Patient Care Team:  Jamaal Bravo MD as PCP - Marco A Chauhan DPM as Consulting Physician (Podiatry)    Chief Complaint:  Continued antibiotic therapy and medical management  History of Present Illness: Patient is a 62 y/o male with a pmh of type 2 DM, hypertension, CAD, and neuropathy.  He recently underwent surgical repair of Charcot deformity with external fixator stabilization to his left foot related to cellulitis.  He tolerated the surgical procedure well.  He will need IV antibiotic therapy through 23 due to bone/joint infection.  He remains non-weight bearing of left lower extremity.  He tells me the pain to his left foot remains controlled, however, he does have a torn left rotator cuff and arthritis so his hip and shoulder are the source of his pain.  He tells me he is doing well and he has no other complaints or concerns.  I have reviewed the patients most recent labs and diagnostics independently.  Glucose 124, BUN 15, creatinine 0.89, sodium 138, potassium 4.2, chloride 105, co2 24, wbc 5.25, hgb 9.9, hct 30.1, platelets 156.       Assessment      Cellulitis of left foot  Charcot's joint of left foot  Type 2 DM  Arthritis  A-fib  Hypertension  Rheumatoid arthritis  DVT & GI prophylaxis     DVT Prophylaxis: Heparin  GI Prophylaxis: Protonix  Code Status: Full    Plan     -Stable at time of exam.  -We will continue to follow podiatry recommendations.  -Continue IV antibiotic therapy as before.  -Pharmacy to dose vancomycin.  -Non-weight bearing to left lower extremity.  -Therapy as patient tolerates.  -Strongly recommend out of bed daily.  -Monitor I&O.  -Telemetry.  -We will monitor FSBS and administer insulin coverage as ordered.  -Cardiology follow up appreciated.  -We will  follow cardiology recommendations.  -Awaiting echocardiogram results.  -We will consider repeat CT tomorrow.    -1x dose Lasix for pulmonary congestion.  -Shortness of breath and chest pain improved.  -DVT and GI prophylaxis in place.  -We'll continue monitoring patient in hospital setting and treat patient as course dictates.  -Please review orders for detailed plan of care.  -For Acute and Chronic medical condition we will continue medications described below in medication section which have been reviewed and we will continue unless changed in plan of care.  -Laboratory and diagnostic studies have been independently reviewed and the reports are reviewed as documented below.    Subjective   Interval History:   Patient Complaints:   Patient seen and examined. Sitting up in chair in room.  Shortness of breath with exertion, however, it does subside.  No chest pain or palpitations.  No needs or concerns.    History taken from: Patient, patient's chart.    Review of Systems:    Review of Systems   Constitutional: Positive for activity change. Negative for chills and fever.   HENT: Negative for postnasal drip and tinnitus.    Respiratory: Positive for chest tightness and shortness of breath. Negative for apnea.    Cardiovascular: Negative for chest pain and palpitations.   Gastrointestinal: Negative for abdominal distention, nausea and vomiting.   Musculoskeletal: Positive for arthralgias and myalgias.   Skin: Positive for wound.   Neurological: Negative for tremors and seizures.   Psychiatric/Behavioral: Negative for agitation, confusion and hallucinations.       Objective   Vital Signs  Temp: 97 F         Heart Rate: 93         Resp: 18          Blood Pressure: 112/67         Pulse Ox: 100%    Physical Exam:   Physical Exam  Vitals reviewed.   Constitutional:       Appearance: Normal appearance. He is not ill-appearing.   HENT:      Head: Normocephalic and atraumatic.      Mouth/Throat:      Mouth: Mucous membranes  are moist.      Pharynx: Oropharynx is clear.   Eyes:      Extraocular Movements: Extraocular movements intact.      Pupils: Pupils are equal, round, and reactive to light.   Neck:      Vascular: No carotid bruit.   Cardiovascular:      Rate and Rhythm: Regular rhythm. Tachycardia present.      Pulses: Normal pulses.      Heart sounds: Normal heart sounds. No murmur heard.    No gallop.   Pulmonary:      Effort: Pulmonary effort is normal. No respiratory distress.      Breath sounds: Normal breath sounds. No rales.   Abdominal:      General: Bowel sounds are normal. There is no distension.      Palpations: Abdomen is soft.      Tenderness: There is no guarding.   Musculoskeletal:         General: Normal range of motion.      Cervical back: Normal range of motion and neck supple.      Right lower leg: Edema present.      Left lower leg: Edema present.   Skin:     General: Skin is warm and dry.      Capillary Refill: Capillary refill takes less than 2 seconds.      Findings: Bruising present.      Comments: S/P L surgical repair Charcot foot with external fixator.  Wound vac in place   Neurological:      General: No focal deficit present.      Mental Status: He is alert and oriented to person, place, and time.   Psychiatric:         Mood and Affect: Mood normal.         Behavior: Behavior normal.         Thought Content: Thought content normal.         Judgment: Judgment normal.       Results Review:       Results from last 7 days   Lab Units 04/24/23  0205 04/23/23  0644 04/21/23  0530 04/20/23  0617 04/19/23  0457 04/18/23  1250   SODIUM mmol/L 134* 136 138 136 137 137   POTASSIUM mmol/L 4.3 4.6 4.2 4.0 4.1 4.3   CHLORIDE mmol/L 99 102 105 103 105 102   CO2 mmol/L 23.0 23.0 24.0 24.0 22.0 23.0   BUN mg/dL 24* 18 15 15 18 20   CREATININE mg/dL 1.17 1.11 0.89 0.94 0.99 1.05   GLUCOSE mg/dL 160* 193* 124* 114* 100* 103*   CALCIUM mg/dL 9.0 8.7 8.5* 8.6 8.5* 9.1   BILIRUBIN mg/dL 0.4 0.5  --   --   --   --    ALK PHOS  U/L 85 76  --   --   --   --    ALT (SGPT) U/L 62* 66*  --   --   --   --    AST (SGOT) U/L 31 42*  --   --   --   --      Results from last 7 days   Lab Units 04/24/23  0205 04/23/23  0644   MAGNESIUM mg/dL 2.0 1.9     Results from last 7 days   Lab Units 04/24/23  0205 04/23/23  0644 04/21/23  0530 04/20/23  0617 04/19/23  0457 04/18/23  1251   WBC 10*3/mm3 6.20 6.33 5.25 4.90 5.15 5.18   HEMOGLOBIN g/dL 10.1* 10.2* 9.9* 10.3* 9.5* 11.4*   HEMATOCRIT % 31.0* 30.8* 30.1* 31.7* 27.9* 34.7*   PLATELETS 10*3/mm3 143 155 156 155 136* 181     Lab Results   Component Value Date    CKTOTAL 119 04/23/2023    CKMB 2.16 04/23/2023    TROPONINI <0.012 11/19/2014    TROPONINT 50 (H) 04/24/2023     CO2   Date Value Ref Range Status   04/24/2023 23.0 22.0 - 29.0 mmol/L Final         Imaging Results (Most Recent)     Procedure Component Value Units Date/Time    CT Angiogram Chest [[id number]] Collected: 04/24/23 0511     Updated: 04/24/23 0658    Narrative:      INDICATION:  Positive D-dimer    COMPARISON:  None    TECHNIQUE:  Volumetric CT scan of the chest, from the thoracic inlet to the level of the  diaphragms, with intravenous contrast.  2D axial, sagittal, and coronal  reformats were performed.  MIPS were performed on a separate workstation and  reviewed.    FINDINGS:  Lungs: No focal consolidation or mass.  Pleura: No effusion or pneumothorax.  Vessels: Thoracic aorta is normal in caliber.  Bones: No suspicious lesions.  Visualized upper abdomen: Unremarkable.      Impression:      I suspect there are bilateral pulmonary emboli, but it is difficult to tell with  certainty due to suboptimal contrast timing and significant motion artifact.  Repeat examination is recommended.    XR Chest 1 View [[id number]] Collected: 04/23/23 1630     Updated: 04/23/23 1648    Narrative:      HISTORY:  Shortness of breath.    FINDINGS:  Frontal film of the chest was obtained.  There is a dual-lead pacing system in  place via the left  subclavian venous approach.  The pacing leads appear to be in  good position.  There is mild pulmonary vascular congestion with mild  cardiomegaly.  No infiltrate or effusion is seen.      Impression:      1.  Mild cardiomegaly with mild pulmonary vascular congestion.    2.  No acute infiltrate or effusion is seen.         No results found for: ACANTHNAEG, AFBCX, BPERTUSSISCX, BLOODCX  No results found for: BCIDPCR, CXREFLEX, CSFCX, CULTURETIS  No results found for: CULTURES, HSVCX, URCX  No results found for: EYECULTURE, GCCX, HSVCULTURE, LABHSV  No results found for: LEGIONELLA, MRSACX, MUMPSCX, MYCOPLASCX  No results found for: NOCARDIACX, STOOLCX  No results found for: THROATCX, UNSTIMCULT, URINECX, CULTURE, VZVCULTUR  No results found for: VIRALCULTU, WOUNDCX  Medication Reviewed and Will Continue Unless Documented in Plan:   ascorbic acid, 1,000 mg, Oral, Nightly  cetirizine, 10 mg, Oral, Daily  vitamin D3, 5,000 Units, Oral, Nightly  docusate sodium, 100 mg, Oral, BID  dronedarone, 400 mg, Oral, Daily With Dinner  furosemide, 20 mg, Intravenous, Once  glipizide, 10 mg, Oral, QAM AC  heparin (porcine), 5,000 Units, Subcutaneous, Q8H  Insulin Aspart, 2-12 Units, Subcutaneous, 4x Daily With Meals & Nightly  losartan, 25 mg, Oral, Daily  magnesium oxide, 400 mg, Oral, BID  metFORMIN, 1,000 mg, Oral, BID With Meals  metoprolol succinate XL, 25 mg, Oral, BID  senna-docusate sodium, 2 tablet, Oral, BID  sodium chloride, 3 mL, Intravenous, Q12H  vancomycin, 1,250 mg, Intravenous, Q12H      Pharmacy to dose vancomycin,       •  acetaminophen  •  bisacodyl  •  calcium carbonate  •  fluticasone  •  hydrALAZINE  •  muscle rub  •  ondansetron  •  oxyCODONE-acetaminophen  •  Pharmacy to dose vancomycin  •  simethicone  •  sodium chloride  Pharmacy to dose vancomycin,        Dietary Orders (From admission, onward)     Start     Ordered    04/21/23 6094  Diet: Diabetic Diets; Consistent Carbohydrate; Texture: Regular Texture  (IDDSI 7); Fluid Consistency: Thin (IDDSI 0)  Diet Effective Now        References:    Diet Order Crosswalk   Question Answer Comment   Diets: Diabetic Diets    Diabetic Diet: Consistent Carbohydrate    Texture: Regular Texture (IDDSI 7)    Fluid Consistency: Thin (IDDSI 0)        04/21/23 1333              History, physical exam, assessment and plan may have been partly or fully copied from before, but  changes made to the copied record to reflect care on the date of service. Part of the lab and imaging  reports auto populated and corrected. Some of this note may be an electronic transcription of spoken  language to printed text. This may permit erroneous, or at times, nonsensical words or phrases to be  inadvertently transcribed. Although I have reviewed the note for such errors, some may still exist.      This document has been electronically signed by Charlie Persaud MD on April 24, 2023 09:19 CDT

## 2023-04-24 NOTE — ACP (ADVANCE CARE PLANNING)
Cardiology Consultation Note.        Patient Name: Emre Lisa  Age/Sex: 63 y.o. male  : 1960  MRN: [id number]    Date of consultation: 2023  Consulting Physician: Lavon Sterling MD  Primary care Physician: Jamaal Bravo MD  Requesting Physician:   Dr. BERYL Persaud  Reason for consultation: Chest pain positive troponin      Subjective:       Chief Complaint: Chest pain shortness of breath    History of Present Illness:  Emre Lisa is a 63 y.o. male     with a weight of 156 kg, height of 76 inches, with a body mass index of 42, with history of nonischemic cardiomyopathy status post [employer] dual chamber AICD placement, Fort[employer] model number [id number], serial number [id number] done in [place], with explantation of the old generator and replacement of the new generator in 2016 with [employer] AICD placement model number DR 2357 -40Q, serial number [id number], history of arterial hypertension, hypertensive heart disease, with previous coronary angiogram which had not revealed any evidence of any obstructive epicardial coronary artery disease, history of attempted radiofrequency ablation for the ventricular tachyarrhythmia, history oftype 2 diabetes, episodes of snoring, hyperlipidemia, chronic pain syndrome, anxiety. The patient had undergone previous nuclear Cardiolite stress test in 2013 which had not revealed anyevidence of any stress induced ischemia with left ventricular systolicdysfunction with an ejection fraction of 44%.        Patient was hospitalized for left ankle cellulitis and underwent surgical intervention with Dr. Thompson.  Patient was subsequently transferred to LTAC for rehabilitation.    Patient in the LTAC unit had complained of having symptoms of chest pain associated with shortness of breath.  Patient was hypoxic.  Patient resting electrocardiogram had revealed sinus tachycardia.  Patient initial troponin was mildly elevated.    Due to the patient elevated  troponin with symptoms of chest pain cardiology was consulted.    On my evaluation patient has not complained of having any further recurrent symptoms or chest pain.  Patient does complain of having symptoms of shortness of breath.  Patient was on anticoagulation with heparin for DVT prophylaxis.    Patient does have nonischemic cardiomyopathy with previous coronary angiogram and nuclear stress test which had not reveal of any evidence of any stress-induced ischemia.    Patient's 10 point review of systems was negative. Patient's initial and subsequent cardiac enzymes negative.                  Concurrent Medical History:  1.  Chest pain positive troponin.  2.  Coronary angiogram done in 2010 which did not reveal of any evidence of any obstructive epicardial coronary artery disease.  3.  Negative myocardial perfusion Lexiscan Cardiolite stress test for stress-induced ischemia 2013.  4.  Nonischemic cardiomyopathy with an ejection fraction of 45%.  5.  Status post electrophysiology study done in [place] with  episodes of inducible ventricular tachyarrhythmia and atrial  fibrillation maintained in sinus rhythm on Multaq with attempted  radiofrequency ablation.  6.   Status post [employer] AICD placement model number DR 2357 -40Q, serial number [id number], implanted in July 13, 2016.    7.    Hypertensive heart disease.  8.    Mild mitral and mild tricuspid regurgitation.  9.    Hyperlipidemia.  10.   Obesity with a body mass index of 47.  11.   Vitamin D deficiency.  12.   Non-insulin dependent diabetes.  13.   Gastroesophageal reflux disease.  14.   Episodes of snoring with no previous sleep study evaluation.  15.  Rheumatoid arthritis.  16.  Paroxysmal atrial fibrillation.  17.  Diabetic polyneuropathy     PAST SURGICAL HISTORY:  1.  Status post [employer] single chamber AICD implantation serial  #[id number] done in August 2010 with explantation of the old generator and implantation of a new generator in July 13, 2016 with  [employer] AICD placement model number DR 2357 -40Q, serial number [id number],   2.  Tonsillectomy.  3.  Open wound with ulceration of the right foot.  4.  Electrophysiology study with attempted radiofrequency ablation for ventricular tachyarrhythmia and atrial fibrillation.  5.  Repair of radial ulnar fracture.  6.  Carpal tunnel repair.  7.  Left ankle surgical intervention  8.  Coronary angiogram done in 2010 which did not reveal of any evidence of any obstructive epicardial coronary artery disease  9.  Debridement of the right ankle wound.  10.  Right transmetatarsal amputation.     SOCIAL HISTORY:  Denies any tobacco or alcohol intake.     FAMILY HISTORY:  Noncontributory.     CARDIAC RISK FACTORS:  1.    Male.  2.    Obesity.  3.    Arterial hypertension.  4.    Hyperlipidemia.  5.    Diabetes.        Allergies:  Allergies   Allergen Reactions   • Januvia [Sitagliptin] Hives   • Lipitor [Atorvastatin] Other (See Comments)     Muscle Cramps...   • Shellfish Allergy Other (See Comments)   • Sulfa Antibiotics Rash       Medication:  Medications Prior to Admission   Medication Sig Dispense Refill Last Dose   • ascorbic acid (VITAMIN C) 1000 MG tablet Take 1 tablet by mouth Every Night.      • Bacillus Coagulans-Inulin (Probiotic) 1-250 BILLION-MG capsule Take 1 capsule by mouth 2 (Two) Times a Day. 30 capsule 1    • Blood Glucose Monitoring Suppl (ONE TOUCH ULTRA 2) w/Device kit       • Cholecalciferol 125 MCG (5000 UT) tablet Take 1 tablet by mouth Every Night.      • clindamycin (CLEOCIN) 300 MG capsule Take 1 capsule by mouth 3 (Three) Times a Day. 30 capsule 0    • dronedarone (MULTAQ) 400 MG tablet Take 1 tablet by mouth Every Night.      • fexofenadine (ALLEGRA) 180 MG tablet Take 1 tablet by mouth Every Night.      • fluticasone (FLONASE) 50 MCG/ACT nasal spray 2 sprays into the nostril(s) as directed by provider As Needed for Rhinitis.      • furosemide (Lasix) 20 MG tablet Take 1 tablet by mouth Daily. 10  tablet 0    • glimepiride (AMARYL) 4 MG tablet Take 1 tablet by mouth Every Night.      • losartan (COZAAR) 25 MG tablet Take 1 tablet by mouth Daily.      • magnesium oxide (MAG-OX) 400 MG tablet Take 1 tablet by mouth 2 (Two) Times a Day.      • metFORMIN (GLUCOPHAGE) 1000 MG tablet Take 1 tablet by mouth 2 (Two) Times a Day With Meals.      • metoprolol succinate XL (TOPROL-XL) 25 MG 24 hr tablet Take 1 tablet by mouth 2 (Two) Times a Day. 0.5 tablet      • multivitamin (THERAGRAN) tablet tablet Take  by mouth Every Night.      • ONE TOUCH ULTRA TEST test strip USE TO TEST BLOOD SUGAR THREE TIMES A DAY  1    • vancomycin 1750 mg/500 mL 0.9% NS IVPB ([facility]) Infuse 500 mL into a venous catheter Every 12 (Twelve) Hours for 70 doses. Indications: Bone and/or Joint Infection 85915 mL 1    • Zinc 50 MG tablet Take 1 tablet by mouth Every Night.              Review of Systems:       Constitutional:  Denies recent weight loss, weight gain, fever or chills, no change in exercise tolerance.     HENT:  Denies any hearing loss, epistaxis, hoarseness, or difficulty speaking.     Eyes: Wears eyeglasses or contact lenses     Respiratory:  Denies dyspnea with exertion,no cough, wheezing, or hemoptysis.     Cardiovascular: Positive for chest pain.  Negative for palpitations,  orthopnea, PND, peripheral edema, syncope, or claudication.     Gastrointestinal:  Denies change in bowel habits, dyspepsia, ulcer disease, hematochezia, or melena.  No nausea, no vomiting, no hematemesis, no diarrhea or constipation.    Endocrine: Negative for cold intolerance, heat intolerance, polydipsia, polyphagia or polyuria. Denies any history of weight change or unintended weight loss.    Genitourinary: Negative for hematuria.  No frequent urination or nocturia.      Musculoskeletal: Denies any history of arthritic symptoms or back problems .  No joint pain, joint stiffness, joint swelling, muscle pain, muscle weakness or neck pain.    Skin:  Denies  any change in hair or nails, rashes, or skin lesions.     Allergic/Immunologic: Negative.  Negative for environmental allergies, food allergies or immunocompromised state.     Neurological:  Denies any history of recurrent headaches, strokes, TIA, or seizure disorder.     Hematological: Denies excessive bleeding, easy bruising, fatigue, lymphadenopathy or petechiae or any bleeding disorders.     Psychiatric/Behavioral: Denies any history of depression, substance abuse, or change in cognitive function. Denies any psychomotor reaction or tangential thought.  No depression, homicidal ideations or suicidal ideations.          Objective:     Objective:  Heart Rate:  [] 95      There is no height or weight on file to calculate BMI.           Physical Exam:   General Appearance:    Alert, oriented, cooperative, in no acute distress.   Head:    Normocephalic, atraumatic, without obvious abnormality.   Eyes:           STEPHANIE.  Lids and lashes normal, conjunctivae and sclerae normal, no icterus, no pallor.   Ears:    Ears appear intact with no abnormalities noted.   Throat:   Mucous membranes pink and moist.   Neck:   Supple, trachea midline, no carotid bruit, no organomegaly or JVD.   Lungs:     Clear to auscultation and percussion, respirations regular, even and unlabored. No wheezes, rales or rhonchi.    Heart:    Regular rhythm and normal rate, normal S1 and S2, no murmur, no gallop, no rub, no click.   Abdomen:     Soft, nontender, nondistended, no guarding, no rebound tenderness, normal bowel sounds in all four quadrants, no masses, liver and spleen nonpalpable.   Genitalia:    Deferred.   Extremities:   Moves all extremities well, no edema, no cyanosis, no  redness, no clubbing.   Pulses:   Pulses palpable and equal bilaterally.   Skin:   Moist and warm. No bleeding, bruising or rash.   Neurologic/Psychiatric:   Alert and oriented to person, place, and time.  Motor, power and tone in upper and lower extremities  are grossly intact. No focal neurological deficits. Normal cognitive function. No psychomotor reaction or tangential thought. No depression, homicidal ideations and suicidal ideations.       Medication Review:   Current Facility-Administered Medications   Medication Dose Route Frequency Provider Last Rate Last Admin   • !Vancomycin Trough Level Draw Needed   Does not apply Once Charlie Persaud MD       • acetaminophen (TYLENOL) tablet 650 mg  650 mg Oral Q6H PRN Charlie Persaud MD       • ascorbic acid (VITAMIN C) tablet 1,000 mg  1,000 mg Oral Nightly Charlie Persaud MD       • bisacodyl (DULCOLAX) suppository 5 mg  5 mg Rectal Daily PRN Charlie Persaud MD       • calcium carbonate (TUMS) chewable tablet 500 mg (200 mg elemental)  2 tablet Oral TID PRN Charlie Persaud MD       • cetirizine (zyrTEC) tablet 10 mg  10 mg Oral Daily Charlie Persaud MD       • cholecalciferol (VITAMIN D3) tablet 5,000 Units  5,000 Units Oral Nightly Charlie Persaud MD       • docusate sodium (COLACE) capsule 100 mg  100 mg Oral BID Charlie Persaud MD       • dronedarone (MULTAQ) tablet 400 mg  400 mg Oral Daily With Dinner Charlie Persaud MD       • fluticasone (FLONASE) 50 MCG/ACT nasal spray 2 spray  2 spray Nasal Daily PRN Charlie Persaud MD       • furosemide (LASIX) injection 20 mg  20 mg Intravenous Once Gamaliel Hardin MD       • glipizide (GLUCOTROL) tablet 10 mg  10 mg Oral QAM AC Charlie Persaud MD       • heparin (porcine) 5000 UNIT/ML injection 5,000 Units  5,000 Units Subcutaneous Q8H Charlie Persaud MD       • hydrALAZINE (APRESOLINE) injection 10 mg  10 mg Intravenous Q4H PRN Charlie Persaud MD       • Insulin Aspart (novoLOG) injection 2-12 Units  2-12 Units Subcutaneous 4x Daily With Meals & Nightly Charlie Persaud MD       • losartan (COZAAR) tablet 25 mg  25 mg Oral Daily Charlie Persaud  MD Clemente       • magnesium oxide (MAG-OX) tablet 400 mg  400 mg Oral BID Charlie Persaud MD       • metFORMIN (GLUCOPHAGE) tablet 1,000 mg  1,000 mg Oral BID With Meals Charlie Persaud MD       • metoprolol succinate XL (TOPROL-XL) 24 hr tablet 25 mg  25 mg Oral BID Charlie Persaud MD       • muscle rub (BenGay) 10-15 % cream 1 application  1 application Topical Q1H PRN Charlie Persaud MD       • ondansetron (ZOFRAN) injection 4 mg  4 mg Intravenous Q4H PRN Charlie Persaud MD       • oxyCODONE-acetaminophen (PERCOCET) 7.5-325 MG per tablet 1 tablet  1 tablet Oral Q4H PRN Charlie Persaud MD       • Pharmacy to dose vancomycin   Does not apply Continuous PRN Charlie Persaud MD       • sennosides-docusate (PERICOLACE) 8.6-50 MG per tablet 2 tablet  2 tablet Oral BID Charlie Persaud MD       • simethicone (MYLICON) chewable tablet 80 mg  80 mg Oral 4x Daily PRN Charlie Persaud MD       • sodium chloride 0.9 % flush 3 mL  3 mL Intravenous Q12H Bindu Rodriguez, WALTER       • sodium chloride 0.9 % flush 3 mL  3 mL Intravenous PRN Bindu Rodriguez APRN       • vancomycin 1750 mg/500 mL 0.9% NS IVPB ([facility])  1,750 mg Intravenous Q12H Charlie Persaud MD           Lab Review:     Results from last 7 days   Lab Units 04/23/23  0644   SODIUM mmol/L 136   POTASSIUM mmol/L 4.6   CHLORIDE mmol/L 102   CO2 mmol/L 23.0   BUN mg/dL 18   CREATININE mg/dL 1.11   CALCIUM mg/dL 8.7   BILIRUBIN mg/dL 0.5   ALK PHOS U/L 76   ALT (SGPT) U/L 66*   AST (SGOT) U/L 42*   GLUCOSE mg/dL 193*     Results from last 7 days   Lab Units 04/23/23  0856 04/23/23  0644   CK TOTAL U/L  --  119   HSTROP T ng/L 64* 55*         Results from last 7 days   Lab Units 04/23/23  0644   WBC 10*3/mm3 6.33   HEMOGLOBIN g/dL 10.2*   HEMATOCRIT % 30.8*   PLATELETS 10*3/mm3 155         Results from last 7 days   Lab Units 04/23/23  0644   MAGNESIUM mg/dL 1.9                    EKG:   ECG/EMG Results (last 24 hours)     Procedure Component Value Units Date/Time    ECG 12 Lead Dyspnea [[id number]] Collected: 04/23/23 0359     Updated: 04/23/23 0734     QT Interval 370 ms      QTC Interval 489 ms     Narrative:      Test Reason : Dyspnea  Blood Pressure :   */*   mmHG  Vent. Rate : 105 BPM     Atrial Rate : 105 BPM     P-R Int : 158 ms          QRS Dur : 104 ms      QT Int : 370 ms       P-R-T Axes :  38   5  51 degrees     QTc Int : 489 ms    Sinus tachycardia with occasional Premature ventricular complexes  Nonspecific ST abnormality  Abnormal ECG  When compared with ECG of 11-NOV-2019 11:27,  Premature ventricular complexes are now Present  ST now depressed in Anterior leads  Nonspecific T wave abnormality now evident in Anterior leads    Referred By:            Confirmed By:     Adult Transthoracic Echo Complete W/ Cont if Necessary Per Protocol [[id number]] Resulted: 04/23/23 1243     Updated: 04/23/23 1244     Target HR (85%) 135 bpm      Max. Pred. HR (100%) 159 bpm      LVIDd 6.3 cm      LVIDs 5.7 cm      IVSd 1.42 cm      LVPWd 1.28 cm      FS 10.1 %      IVS/LVPW 1.11 cm      ESV(cubed) 184.2 ml      LV Sys Vol (BSA corrected) 44.8 cm2      EDV(cubed) 253.5 ml      LV Stevens Vol (BSA corrected) 56.0 cm2      LVOT area 5.1 cm2      LV mass(C)d 403.3 grams      LVOT diam 2.6 cm      EDV(MOD-sp4) 155.0 ml      ESV(MOD-sp4) 124.0 ml      SV(MOD-sp4) 31.0 ml      SI(MOD-sp4) 11.2 ml/m2      EF(MOD-sp4) 20.0 %      MV E max charly 47.3 cm/sec      MV A max charly 74.9 cm/sec      MV E/A 0.63     LA ESV Index (BP) 19.1 ml/m2      Lat Peak E' Charly 5.0 cm/sec      SV(LVOT) 71.7 ml      LA dimension (2D)  4.1 cm      LV V1 max 67.6 cm/sec      LV V1 max PG 1.83 mmHg      LV V1 mean PG 0.94 mmHg      LV V1 VTI 13.9 cm      Ao pk charly 145.4 cm/sec      Ao max PG 8.5 mmHg      Ao mean PG 4.2 mmHg      Ao V2 VTI 22.7 cm      LYDIA(I,D) 3.2 cm2      MV max PG 3.7 mmHg      MV mean PG 1.65 mmHg      MV V2  VTI 13.3 cm      MV P1/2t 21.8 msec      MVA(P1/2t) 10.1 cm2      MVA(VTI) 5.4 cm2      MV dec slope 703.5 cm/sec2      TR max arik 159.0 cm/sec      TR max PG 10.1 mmHg      RVSP(TR) 25.1 mmHg      RAP systole 15.0 mmHg      PA V2 max 98.9 cm/sec      Ao root diam 4.1 cm      ACS 1.52 cm     Narrative:      •  Estimated right ventricular systolic pressure from tricuspid   regurgitation is normal (<35 mmHg).      Impression:                ECHO:       Imaging:  Imaging Results (Last 24 Hours)     Procedure Component Value Units Date/Time    XR Chest 1 View [[id number]] Collected: 04/23/23 1630     Updated: 04/23/23 1648    Narrative:      HISTORY:  Shortness of breath.    FINDINGS:  Frontal film of the chest was obtained.  There is a dual-lead pacing system in  place via the left subclavian venous approach.  The pacing leads appear to be in  good position.  There is mild pulmonary vascular congestion with mild  cardiomegaly.  No infiltrate or effusion is seen.      Impression:      1.  Mild cardiomegaly with mild pulmonary vascular congestion.    2.  No acute infiltrate or effusion is seen.          I personally viewed and interpreted the patient's EKG/Telemetry data.    Assessment:   1.  Chest pain positive troponin.  2.  Nonischemic cardiomyopathy.  3.  Inducible ventricular tachyarrhythmia status post [employer] AICD implantation.  4.  Arterial hypertension.  5.  Hypertensive heart disease.          Plan:   1.  Chest pain positive troponin.  Patient has symptoms of chest pain associated with shortness of breath and diaphoresis.  Patient has been in the hospital.  Patient at the present time would undergo serial troponin.  Would review the transthoracic echocardiogram.  Patient has no previous history of documented coronary artery disease and has nonischemic cardiomyopathy.  We will check a D-dimer level and would be concerned about pulmonary embolism if the patient D-dimer is elevated.  Would trend serial  troponin.    2.  Nonischemic cardiomyopathy with left ventricular systolic dysfunction with improvement to 45%.  Clinically at the present time patient is not in congestive heart failure.    3.  Inducible ventricular tachyarrhythmia with episodes of atrial fibrillation.  Patient has  Status post [employer] AICD placement model number DR 2357 -40Q, serial number [id number], implanted in July 13, 2016.    Patient previous AICD interrogation had revealed:  AICD interrogation reveals [employer] AICD placement model number DR 2357 -40Q, serial number [id number], implanted in July 13, 2016.     Patient's lead impedance in the atrial lead was 390 and in the ventricular lead was 410.     Patient R-wave sensing was 11.9 and the P-wave sensing was 3.7 with a threshold in the ventricular lead 0.75 and in the atrial lead 0.5. Patient was 1.9% of the time atrial paced.     Patient had 4 episodes of ventricular tachyarrhythmia with several supraventricular tachyarrhythmia. Patient had two episodes of ventricular tachyarrhythmia with delivery of shock.     Patient's battery current was 12 milliamps with a charge time of 12.8 seconds with adequate battery reserve.     Patient supraventricular discriminator was change and ventricular fibrillation was changed to 225 beats per minute to promote overdrive pacing for ventricular tachyarrhythmia.    Patient would undergo a complete AICD interrogation.  Patient has not had any delivery of shock.    4.  Arterial hypertension.  Patient blood pressure has remained labile.  Patient would be continued on the present antihypertensive medication.    5.  Hyperlipidemia.  Patient has been counseled on low-fat low-cholesterol diet and to continue with the present dose of the Zetia.    6.  History of paroxysmal atrial fibrillation.  Patient has remained in sinus rhythm.    7.  Obesity with a body mass index of 50.  Patient has been counseled on weight reduction lifestyle modification and dietary restriction.   Patient has been recommended sleep study to rule out obstructive sleep apnea.    8.  S/p left ankle surgical intervention needing rehabilitation.  Would continue with the LTAC for rehab.    Thank you for the consultation.    The above plan of management were discussed with the patient      Time: Time spent in face-to-face evaluation of greater than 55 minutes interacting, formulating, examining and discussing the plan with the patient with 50% of greater time spent in face-to-face interaction.    Electronically signed by Lavon Sterling MD, 04/24/23, 1:37 AM CDT.    Dictated utilizing Dragon dictation.

## 2023-04-25 ENCOUNTER — OUTSIDE FACILITY SERVICE (OUTPATIENT)
Dept: PULMONOLOGY | Facility: CLINIC | Age: 63
End: 2023-04-25
Payer: MEDICARE

## 2023-04-25 ENCOUNTER — APPOINTMENT (OUTPATIENT)
Dept: ULTRASOUND IMAGING | Facility: HOSPITAL | Age: 63
End: 2023-04-25
Payer: COMMERCIAL

## 2023-04-25 ENCOUNTER — APPOINTMENT (OUTPATIENT)
Dept: CT IMAGING | Facility: HOSPITAL | Age: 63
End: 2023-04-25
Payer: COMMERCIAL

## 2023-04-25 ENCOUNTER — APPOINTMENT (OUTPATIENT)
Dept: GENERAL RADIOLOGY | Facility: HOSPITAL | Age: 63
End: 2023-04-25
Payer: COMMERCIAL

## 2023-04-25 PROBLEM — R55 SYNCOPE: Status: ACTIVE | Noted: 2023-04-25

## 2023-04-25 PROBLEM — R06.00 DYSPNEA: Status: ACTIVE | Noted: 2023-04-25

## 2023-04-25 PROBLEM — J96.01 ACUTE RESPIRATORY FAILURE WITH HYPOXIA: Status: ACTIVE | Noted: 2023-04-25

## 2023-04-25 PROBLEM — R00.0 TACHYCARDIA: Status: ACTIVE | Noted: 2023-04-25

## 2023-04-25 LAB
ALBUMIN SERPL-MCNC: 3.9 G/DL (ref 3.5–5.2)
ALBUMIN/GLOB SERPL: 1.3 G/DL
ALP SERPL-CCNC: 88 U/L (ref 39–117)
ALT SERPL W P-5'-P-CCNC: 101 U/L (ref 1–41)
ANION GAP SERPL CALCULATED.3IONS-SCNC: 19 MMOL/L (ref 5–15)
ARTERIAL PATENCY WRIST A: ABNORMAL
AST SERPL-CCNC: 69 U/L (ref 1–40)
ATMOSPHERIC PRESS: 754 MMHG
BACTERIA UR QL AUTO: ABNORMAL /HPF
BASE EXCESS BLDA CALC-SCNC: -5.8 MMOL/L (ref 0–2)
BDY SITE: ABNORMAL
BILIRUB SERPL-MCNC: 0.7 MG/DL (ref 0–1.2)
BILIRUB UR QL STRIP: NEGATIVE
BUN SERPL-MCNC: 34 MG/DL (ref 8–23)
BUN/CREAT SERPL: 24.5 (ref 7–25)
CALCIUM SPEC-SCNC: 8.5 MG/DL (ref 8.6–10.5)
CHLORIDE SERPL-SCNC: 99 MMOL/L (ref 98–107)
CLARITY UR: ABNORMAL
CO2 SERPL-SCNC: 15 MMOL/L (ref 22–29)
COLOR UR: YELLOW
CREAT SERPL-MCNC: 1.39 MG/DL (ref 0.76–1.27)
EGFRCR SERPLBLD CKD-EPI 2021: 57.7 ML/MIN/1.73
GAS FLOW AIRWAY: 45 LPM
GEN 5 2HR TROPONIN T REFLEX: 52 NG/L
GLOBULIN UR ELPH-MCNC: 3 GM/DL
GLUCOSE BLDC GLUCOMTR-MCNC: 163 MG/DL (ref 70–130)
GLUCOSE BLDC GLUCOMTR-MCNC: 170 MG/DL (ref 70–130)
GLUCOSE BLDC GLUCOMTR-MCNC: 174 MG/DL (ref 70–130)
GLUCOSE BLDC GLUCOMTR-MCNC: 248 MG/DL (ref 70–130)
GLUCOSE SERPL-MCNC: 305 MG/DL (ref 65–99)
GLUCOSE UR STRIP-MCNC: NEGATIVE MG/DL
HANSEL STAIN: NEGATIVE
HCO3 BLDA-SCNC: 18.9 MMOL/L (ref 20–26)
HGB UR QL STRIP.AUTO: ABNORMAL
HOLD SPECIMEN: NORMAL
HOLD SPECIMEN: NORMAL
HYALINE CASTS UR QL AUTO: ABNORMAL /LPF
INHALED O2 CONCENTRATION: 60 %
KETONES UR QL STRIP: NEGATIVE
LEUKOCYTE ESTERASE UR QL STRIP.AUTO: NEGATIVE
Lab: ABNORMAL
MODALITY: ABNORMAL
NITRITE UR QL STRIP: NEGATIVE
OSMOLALITY UR: 779 MOSM/KG (ref 38–1400)
PCO2 BLDA: 33.5 MM HG (ref 35–45)
PH BLDA: 7.36 PH UNITS (ref 7.35–7.45)
PH UR STRIP.AUTO: <=5 [PH] (ref 5–9)
PO2 BLDA: 66.7 MM HG (ref 83–108)
POTASSIUM SERPL-SCNC: 4.5 MMOL/L (ref 3.5–5.2)
PROT SERPL-MCNC: 6.9 G/DL (ref 6–8.5)
PROT UR QL STRIP: ABNORMAL
QT INTERVAL: 360 MS
QTC INTERVAL: 481 MS
RBC # UR STRIP: ABNORMAL /HPF
REF LAB TEST METHOD: ABNORMAL
SAO2 % BLDCOA: 91.5 % (ref 94–99)
SODIUM SERPL-SCNC: 133 MMOL/L (ref 136–145)
SODIUM UR-SCNC: <20 MMOL/L
SP GR UR STRIP: 1.03 (ref 1–1.03)
SQUAMOUS #/AREA URNS HPF: ABNORMAL /HPF
TROPONIN T DELTA: 3 NG/L
TROPONIN T SERPL HS-MCNC: 49 NG/L
UROBILINOGEN UR QL STRIP: ABNORMAL
VENTILATOR MODE: ABNORMAL
WBC # UR STRIP: ABNORMAL /HPF
WHOLE BLOOD HOLD COAG: NORMAL
WHOLE BLOOD HOLD SPECIMEN: NORMAL

## 2023-04-25 PROCEDURE — 82803 BLOOD GASES ANY COMBINATION: CPT

## 2023-04-25 PROCEDURE — 25010000002 VANCOMYCIN 10 G RECONSTITUTED SOLUTION: Performed by: INTERNAL MEDICINE

## 2023-04-25 PROCEDURE — 93010 ELECTROCARDIOGRAM REPORT: CPT | Performed by: INTERNAL MEDICINE

## 2023-04-25 PROCEDURE — 71045 X-RAY EXAM CHEST 1 VIEW: CPT

## 2023-04-25 PROCEDURE — 87205 SMEAR GRAM STAIN: CPT | Performed by: NURSE PRACTITIONER

## 2023-04-25 PROCEDURE — 81001 URINALYSIS AUTO W/SCOPE: CPT | Performed by: NURSE PRACTITIONER

## 2023-04-25 PROCEDURE — 83935 ASSAY OF URINE OSMOLALITY: CPT | Performed by: NURSE PRACTITIONER

## 2023-04-25 PROCEDURE — 25510000001 IOPAMIDOL PER 1 ML: Performed by: INTERNAL MEDICINE

## 2023-04-25 PROCEDURE — 25010000002 ENOXAPARIN PER 10 MG

## 2023-04-25 PROCEDURE — 80053 COMPREHEN METABOLIC PANEL: CPT | Performed by: INTERNAL MEDICINE

## 2023-04-25 PROCEDURE — 25010000002 HEPARIN (PORCINE) PER 1000 UNITS: Performed by: INTERNAL MEDICINE

## 2023-04-25 PROCEDURE — 82570 ASSAY OF URINE CREATININE: CPT | Performed by: NURSE PRACTITIONER

## 2023-04-25 PROCEDURE — 82962 GLUCOSE BLOOD TEST: CPT

## 2023-04-25 PROCEDURE — 84300 ASSAY OF URINE SODIUM: CPT | Performed by: NURSE PRACTITIONER

## 2023-04-25 PROCEDURE — 93005 ELECTROCARDIOGRAM TRACING: CPT | Performed by: INTERNAL MEDICINE

## 2023-04-25 PROCEDURE — 94799 UNLISTED PULMONARY SVC/PX: CPT

## 2023-04-25 PROCEDURE — 84484 ASSAY OF TROPONIN QUANT: CPT | Performed by: INTERNAL MEDICINE

## 2023-04-25 RX ORDER — MIDODRINE HYDROCHLORIDE 5 MG/1
5 TABLET ORAL ONCE
Status: DISCONTINUED | OUTPATIENT
Start: 2023-04-25 | End: 2023-04-27

## 2023-04-25 RX ORDER — HEPARIN SODIUM 5000 [USP'U]/ML
5000 INJECTION, SOLUTION INTRAVENOUS; SUBCUTANEOUS EVERY 8 HOURS SCHEDULED
Status: DISCONTINUED | OUTPATIENT
Start: 2023-04-26 | End: 2023-04-25

## 2023-04-25 RX ORDER — TAMSULOSIN HYDROCHLORIDE 0.4 MG/1
0.4 CAPSULE ORAL NIGHTLY
Status: DISCONTINUED | OUTPATIENT
Start: 2023-04-25 | End: 2023-04-27

## 2023-04-25 RX ORDER — ENOXAPARIN SODIUM 100 MG/ML
1 INJECTION SUBCUTANEOUS ONCE
Status: DISCONTINUED | OUTPATIENT
Start: 2023-04-25 | End: 2023-04-25

## 2023-04-25 RX ORDER — SODIUM CHLORIDE 9 MG/ML
100 INJECTION, SOLUTION INTRAVENOUS CONTINUOUS
Status: DISCONTINUED | OUTPATIENT
Start: 2023-04-25 | End: 2023-04-25

## 2023-04-25 RX ORDER — DILTIAZEM HYDROCHLORIDE 5 MG/ML
5 INJECTION INTRAVENOUS ONCE
Status: DISCONTINUED | OUTPATIENT
Start: 2023-04-25 | End: 2023-04-27

## 2023-04-25 NOTE — ACP (ADVANCE CARE PLANNING)
[facility] @ [facility]  INPATIENT PROGRESS NOTE    PATIENT NAME: Emre Lisa      PHYSICIAN: Charlie Persaud MD  : 1960        MRN: [id number]  Patient Care Team:  Jamaal Bravo MD as PCP - Marco A Chauhan DPM as Consulting Physician (Podiatry)    Chief Complaint:  Continued antibiotic therapy and medical management  History of Present Illness: Patient is a 62 y/o male with a pmh of type 2 DM, hypertension, CAD, and neuropathy.  He recently underwent surgical repair of Charcot deformity with external fixator stabilization to his left foot related to cellulitis.  He tolerated the surgical procedure well.  He will need IV antibiotic therapy through 23 due to bone/joint infection.  He remains non-weight bearing of left lower extremity.  He tells me the pain to his left foot remains controlled, however, he does have a torn left rotator cuff and arthritis so his hip and shoulder are the source of his pain.  He tells me he is doing well and he has no other complaints or concerns.  I have reviewed the patients most recent labs and diagnostics independently.  Glucose 124, BUN 15, creatinine 0.89, sodium 138, potassium 4.2, chloride 105, co2 24, wbc 5.25, hgb 9.9, hct 30.1, platelets 156.       Assessment      Cellulitis of left foot  Charcot's joint of left foot  Type 2 DM  Arthritis  A-fib  Hypertension  Rheumatoid arthritis  DVT & GI prophylaxis     DVT Prophylaxis: Heparin  GI Prophylaxis: Protonix  Code Status: Full    Plan     -Patient with critical event this morning.   -Possible vagal response.  Shortness of breath, tachycardic in 120's, SpO2 in 60's.  Rapid response called.  Received 1L NS bolus, Lovenox 170mg.  Placed on non-rebreather, sats up in low 90's.  ABG's, EKG, CXR, CBC, CMP, Troponin.  Remains critical at this time, requiring aggressive monitoring due to hypoxia, tachycardia, and hypotensive.    -We will begin Eliquis  for anticoagulation per podiatry recommendations.   -Continue IV antibiotic therapy as before.  -Pharmacy to dose vancomycin.  -Telemetry.  -We will monitor FSBS and administer insulin coverage as ordered.  -We will follow cardiology recommendations.  -Echo results independently reviewed showing EF of 40-45%.  -Urinary retention.  We will initiate flomax.  -We will obtain follow up CTA tomorrow due to patient status today  -DVT and GI prophylaxis in place.  -We'll continue monitoring patient in hospital setting and treat patient as course dictates.  -Please review orders for detailed plan of care.  -For Acute and Chronic medical condition we will continue medications described below in medication section which have been reviewed and we will continue unless changed in plan of care.  -Laboratory and diagnostic studies have been independently reviewed and the reports are reviewed as documented below.  -Total critical care time spent 37 minutes excluding time spent on separate billable procedures. Time spent does not coincide with other critical care providers. Patient is critically ill requiring continued close observation and management in ICU level setting primarily due to hypotensive/hypoxic respiratory failure. Time was exclusive to this patient and does not include time spent teaching, updating family or performing procedures.    Subjective   Interval History:   Patient Complaints:   Patient seen and examined. Events noted.  Shortness of breath, hypoxic, tachycardic.    History taken from: Patient, patient's chart.    Review of Systems:    Review of Systems   Constitutional: Positive for activity change. Negative for chills and fever.   HENT: Negative for postnasal drip and tinnitus.    Respiratory: Positive for chest tightness and shortness of breath. Negative for apnea.    Cardiovascular: Negative for chest pain and palpitations.   Gastrointestinal: Negative for abdominal distention, nausea and vomiting.    Musculoskeletal: Positive for arthralgias and myalgias.   Skin: Positive for wound.   Neurological: Negative for tremors and seizures.   Psychiatric/Behavioral: Negative for agitation, confusion and hallucinations.       Objective   Vital Signs  Temp: 97.9 F         Heart Rate: 105         Resp: 20          Blood Pressure: 89/54         Pulse Ox: 94%    Physical Exam:   Physical Exam  Vitals reviewed.   Constitutional:       Appearance: Normal appearance. He is not ill-appearing.   HENT:      Head: Normocephalic and atraumatic.      Mouth/Throat:      Mouth: Mucous membranes are moist.      Pharynx: Oropharynx is clear.   Eyes:      Extraocular Movements: Extraocular movements intact.      Pupils: Pupils are equal, round, and reactive to light.   Neck:      Vascular: No carotid bruit.   Cardiovascular:      Rate and Rhythm: Regular rhythm. Tachycardia present.      Pulses: Normal pulses.      Heart sounds: Normal heart sounds. No murmur heard.    No gallop.   Pulmonary:      Effort: Pulmonary effort is normal. No respiratory distress.      Breath sounds: Normal breath sounds. No rales.   Abdominal:      General: Bowel sounds are normal. There is no distension.      Palpations: Abdomen is soft.      Tenderness: There is no guarding.   Musculoskeletal:         General: Normal range of motion.      Cervical back: Normal range of motion and neck supple.      Right lower leg: Edema present.      Left lower leg: Edema present.   Skin:     General: Skin is warm and dry.      Capillary Refill: Capillary refill takes less than 2 seconds.      Findings: Bruising present.      Comments: S/P L surgical repair Charcot foot with external fixator.  Wound vac in place   Neurological:      General: No focal deficit present.      Mental Status: He is alert and oriented to person, place, and time.   Psychiatric:         Mood and Affect: Mood normal.         Behavior: Behavior normal.         Thought Content: Thought content normal.          Judgment: Judgment normal.       Results Review:       Results from last 7 days   Lab Units 04/25/23  0739 04/24/23  0205 04/23/23  0644 04/21/23  0530 04/20/23  0617 04/19/23  0457 04/18/23  1250   SODIUM mmol/L 133* 134* 136 138 136 137 137   POTASSIUM mmol/L 4.5 4.3 4.6 4.2 4.0 4.1 4.3   CHLORIDE mmol/L 99 99 102 105 103 105 102   CO2 mmol/L 15.0* 23.0 23.0 24.0 24.0 22.0 23.0   BUN mg/dL 34* 24* 18 15 15 18 20   CREATININE mg/dL 1.39* 1.17 1.11 0.89 0.94 0.99 1.05   GLUCOSE mg/dL 305* 160* 193* 124* 114* 100* 103*   CALCIUM mg/dL 8.5* 9.0 8.7 8.5* 8.6 8.5* 9.1   BILIRUBIN mg/dL 0.7 0.4 0.5  --   --   --   --    ALK PHOS U/L 88 85 76  --   --   --   --    ALT (SGPT) U/L 101* 62* 66*  --   --   --   --    AST (SGOT) U/L 69* 31 42*  --   --   --   --      Results from last 7 days   Lab Units 04/24/23  0205 04/23/23  0644   MAGNESIUM mg/dL 2.0 1.9     Results from last 7 days   Lab Units 04/24/23  0205 04/23/23  0644 04/21/23  0530 04/20/23  0617 04/19/23 0457 04/18/23  1251   WBC 10*3/mm3 6.20 6.33 5.25 4.90 5.15 5.18   HEMOGLOBIN g/dL 10.1* 10.2* 9.9* 10.3* 9.5* 11.4*   HEMATOCRIT % 31.0* 30.8* 30.1* 31.7* 27.9* 34.7*   PLATELETS 10*3/mm3 143 155 156 155 136* 181     Lab Results   Component Value Date    CKTOTAL 119 04/23/2023    CKMB 2.16 04/23/2023    TROPONINI <0.012 11/19/2014    TROPONINT 49 (H) 04/25/2023     pH, Arterial   Date Value Ref Range Status   04/25/2023 7.360 7.350 - 7.450 pH units Final     CO2   Date Value Ref Range Status   04/25/2023 15.0 (L) 22.0 - 29.0 mmol/L Final         Imaging Results (Most Recent)     Procedure Component Value Units Date/Time    XR Chest 1 View [[id number]] Collected: 04/25/23 0744     Updated: 04/25/23 0902    Narrative:      HISTORY:  Post code.    COMPARISON:  4/23/2023    FINDINGS:  AP view of the chest demonstrates clear lungs and mild cardiomegaly.  There is  no pneumothorax.  Left-sided AICD is unchanged.      Impression:      Mild cardiomegaly.      CT  Angiogram Chest [[id number]] Resulted: 04/25/23 0848     Updated: 04/25/23 0849    CT Angiogram Chest [[id number]] Collected: 04/24/23 0511     Updated: 04/24/23 0658    Narrative:      INDICATION:  Positive D-dimer    COMPARISON:  None    TECHNIQUE:  Volumetric CT scan of the chest, from the thoracic inlet to the level of the  diaphragms, with intravenous contrast.  2D axial, sagittal, and coronal  reformats were performed.  MIPS were performed on a separate workstation and  reviewed.    FINDINGS:  Lungs: No focal consolidation or mass.  Pleura: No effusion or pneumothorax.  Vessels: Thoracic aorta is normal in caliber.  Bones: No suspicious lesions.  Visualized upper abdomen: Unremarkable.      Impression:      I suspect there are bilateral pulmonary emboli, but it is difficult to tell with  certainty due to suboptimal contrast timing and significant motion artifact.  Repeat examination is recommended.    XR Chest 1 View [[id number]] Collected: 04/23/23 1630     Updated: 04/23/23 1648    Narrative:      HISTORY:  Shortness of breath.    FINDINGS:  Frontal film of the chest was obtained.  There is a dual-lead pacing system in  place via the left subclavian venous approach.  The pacing leads appear to be in  good position.  There is mild pulmonary vascular congestion with mild  cardiomegaly.  No infiltrate or effusion is seen.      Impression:      1.  Mild cardiomegaly with mild pulmonary vascular congestion.    2.  No acute infiltrate or effusion is seen.         No results found for: ACANTHNAEG, AFBCX, BPERTUSSISCX, BLOODCX  No results found for: BCIDPCR, CXREFLEX, CSFCX, CULTURETIS  No results found for: CULTURES, HSVCX, URCX  No results found for: EYECULTURE, GCCX, HSVCULTURE, LABHSV  No results found for: LEGIONELLA, MRSACX, MUMPSCX, MYCOPLASCX  No results found for: NOCARDIACX, STOOLCX  No results found for: THROATCX, UNSTIMCULT, URINECX, CULTURE, VZVCULTUR  No results found for: VIRALCULTU, WOUNDCX  Medication  Reviewed and Will Continue Unless Documented in Plan:   ascorbic acid, 1,000 mg, Oral, Nightly  cetirizine, 10 mg, Oral, Daily  vitamin D3, 5,000 Units, Oral, Nightly  docusate sodium, 100 mg, Oral, BID  dronedarone, 400 mg, Oral, Daily With Dinner  enoxaparin, 1 mg/kg, Subcutaneous, Once  furosemide, 20 mg, Intravenous, Once  furosemide, 40 mg, Intravenous, Once  glipizide, 10 mg, Oral, QAM AC  [START ON 4/26/2023] heparin (porcine), 5,000 Units, Subcutaneous, Q8H  Insulin Aspart, 2-12 Units, Subcutaneous, 4x Daily With Meals & Nightly  iopamidol, 100 mL, Intravenous, Once in imaging  magnesium oxide, 400 mg, Oral, BID  metFORMIN, 1,000 mg, Oral, BID With Meals  metoprolol succinate XL, 25 mg, Oral, BID  midodrine, 5 mg, Oral, Once  senna-docusate sodium, 2 tablet, Oral, BID  sodium chloride, 3 mL, Intravenous, Q12H  sodium chloride, 75 mL/hr, Intravenous, Once  vancomycin, 1,250 mg, Intravenous, Q12H      Pharmacy to dose vancomycin,   sodium chloride, 100 mL/hr      •  acetaminophen  •  bisacodyl  •  calcium carbonate  •  fluticasone  •  hydrALAZINE  •  muscle rub  •  ondansetron  •  oxyCODONE-acetaminophen  •  Pharmacy to dose vancomycin  •  simethicone  •  sodium chloride  Pharmacy to dose vancomycin,   sodium chloride, 100 mL/hr       Dietary Orders (From admission, onward)     Start     Ordered    04/21/23 1334  Diet: Diabetic Diets; Consistent Carbohydrate; Texture: Regular Texture (IDDSI 7); Fluid Consistency: Thin (IDDSI 0)  Diet Effective Now        References:    Diet Order Crosswalk   Question Answer Comment   Diets: Diabetic Diets    Diabetic Diet: Consistent Carbohydrate    Texture: Regular Texture (IDDSI 7)    Fluid Consistency: Thin (IDDSI 0)        04/21/23 1333              History, physical exam, assessment and plan may have been partly or fully copied from before, but  changes made to the copied record to reflect care on the date of service. Part of the lab and imaging  reports auto populated  and corrected. Some of this note may be an electronic transcription of spoken  language to printed text. This may permit erroneous, or at times, nonsensical words or phrases to be  inadvertently transcribed. Although I have reviewed the note for such errors, some may still exist.      This document has been electronically signed by Charlie Persaud MD on April 25, 2023 09:30 CDT

## 2023-04-25 NOTE — THERAPY RE-EVALUATION
[facility]  SIGNIFICANT NOTE    Length of Stay: 0  Date of Admission: 4/21/2023  Primary Care Physician: Jamaal Bravo MD    Subjective   Chief Complaint:   Syncope  Dyspnea    HPI:    I was called to LTAC as part of a rapid response call for this patient who had a syncopal episode as he was being transferred from bed to chair.  This lasted approximately 2 minutes.  During the event the patient had some shaking that looked like a seizure.  The patient subsequently had difficulty breathing and it had to be supported by the respiratory therapist initially with BVM and respiratory airway maneuvers, including insertion of an oral airway.  Patient was minimally responsive initially but soon was able to follow commands and was breathing adequately spontaneously.  Patient's saturations initially were in the 70%s but with the respiratory interventions the patient came up into the 90 %s on a nonrebreather mask.    I understand that the patient's has already had a CTPA that suggested bilateral PEs although it was substandard and will be repeated today.  Patient is recently had a significant surgery to repair a Charcot joint in his left ankle/foot.  This is currently externally fixated and therefore the patient's mobility is severely hampered.  It    Review of Systems   Unable to perform ROS: Mental status change   Respiratory: Positive for shortness of breath.    Neurological: Positive for syncope.        All pertinent negatives and positives are as above. All other systems have been reviewed and are negative unless otherwise stated.     Objective    Heart Rate:  [102-107] 107    Physical Exam  Vitals and nursing note reviewed.   Constitutional:       General: He is in acute distress.      Appearance: He is morbidly obese. He is ill-appearing. He is not toxic-appearing or diaphoretic.      Interventions: Face mask in place.   HENT:      Head: Normocephalic and  atraumatic.      Nose: Nose normal. No congestion or rhinorrhea.      Mouth/Throat:      Mouth: Mucous membranes are dry.      Pharynx: Oropharynx is clear. No oropharyngeal exudate or posterior oropharyngeal erythema.   Eyes:      General:         Right eye: No discharge.         Left eye: No discharge.      Extraocular Movements: Extraocular movements intact.      Conjunctiva/sclera: Conjunctivae normal.      Pupils: Pupils are equal, round, and reactive to light.   Cardiovascular:      Rate and Rhythm: Normal rate and regular rhythm.      Pulses: Normal pulses.      Heart sounds: Normal heart sounds. No murmur heard.    No friction rub. No gallop.   Pulmonary:      Effort: Tachypnea, accessory muscle usage, prolonged expiration, respiratory distress and retractions present.      Breath sounds: Decreased air movement present. No stridor or transmitted upper airway sounds. Wheezing and rales present. No decreased breath sounds or rhonchi.   Musculoskeletal:      Right lower leg: Edema present.      Left lower leg: Edema present.   Skin:     Coloration: Skin is pale. Skin is not jaundiced.      Findings: No rash.   Neurological:      General: No focal deficit present.      Mental Status: He is oriented to person, place, and time. Mental status is at baseline. He is lethargic.      Cranial Nerves: No cranial nerve deficit.         Ionized absolute is absolute Eloy normal/he is absolutely unknown as results Review:  I have reviewed the labs, radiology results, and diagnostic studies.    Laboratory Data:   Results from last 7 days   Lab Units 04/25/23  0739 04/24/23  0205 04/23/23  0644   SODIUM mmol/L 133* 134* 136   POTASSIUM mmol/L 4.5 4.3 4.6   CHLORIDE mmol/L 99 99 102   CO2 mmol/L 15.0* 23.0 23.0   BUN mg/dL 34* 24* 18   CREATININE mg/dL 1.39* 1.17 1.11   GLUCOSE mg/dL 305* 160* 193*   CALCIUM mg/dL 8.5* 9.0 8.7   BILIRUBIN mg/dL 0.7 0.4 0.5   ALK PHOS U/L 88 85 76   ALT (SGPT) U/L 101* 62* 66*   AST (SGOT) U/L  69* 31 42*   ANION GAP mmol/L 19.0* 12.0 11.0     Estimated Creatinine Clearance: 90 mL/min (A) (by C-G formula based on SCr of 1.39 mg/dL (H)).  Results from last 7 days   Lab Units 04/24/23  0205 04/23/23  0644   MAGNESIUM mg/dL 2.0 1.9         Results from last 7 days   Lab Units 04/24/23  0205 04/23/23  0644 04/21/23  0530 04/20/23  0617 04/19/23  0457   WBC 10*3/mm3 6.20 6.33 5.25 4.90 5.15   HEMOGLOBIN g/dL 10.1* 10.2* 9.9* 10.3* 9.5*   HEMATOCRIT % 31.0* 30.8* 30.1* 31.7* 27.9*   PLATELETS 10*3/mm3 143 155 156 155 136*           Culture Data:   No results found for: BLOODCX  No results found for: URINECX  No results found for: RESPCX  No results found for: WOUNDCX  No results found for: STOOLCX  No components found for: BODYFLD    Radiology Data:   Imaging Results (Last 24 Hours)     Procedure Component Value Units Date/Time    XR Chest 1 View [[id number]] Collected: 04/25/23 0744     Updated: 04/25/23 0902    Narrative:      HISTORY:  Post code.    COMPARISON:  4/23/2023    FINDINGS:  AP view of the chest demonstrates clear lungs and mild cardiomegaly.  There is  no pneumothorax.  Left-sided AICD is unchanged.      Impression:      Mild cardiomegaly.      CT Angiogram Chest [[id number]] Resulted: 04/25/23 0848     Updated: 04/25/23 0849          I have reviewed the patient's current medications.     Assessment/Plan     Active Hospital Problems    Diagnosis    • Dyspnea    • Syncope    • Acute respiratory failure with hypoxia    • Tachycardia        Medical Decision Making  Number and Complexity of problems: 4 - High    Differential Diagnosis:   As above  PE  CHF  MI  Seizure    Conditions and Status:        Condition is improving.     [facility] Data  External documents reviewed: Chart review  My EKG interpretation: Normal sinus rhythm with some T wave inversion in lead III  My plain film interpretation: Cardiomegaly but without any matt signs of pulmonary edema  Tests considered but not ordered: CTPA (as I have  discussed this case with the attending physician and he will proceed with this as ordered from yesterday.)    I have utilized all available immediate resources to obtain, update, or review the patient's current medications (including all prescriptions, over-the-counter products, herbals, cannabis/cannabidiol products, and vitamin/mineral/dietary (nutritional) supplements).      Decision rules/scores evaluated (example [id number], Wells, etc):   Silvestre     Discussed with: The patient and his significant other     Treatment Plan  1.  Continue providing supplemental oxygenation via nonrebreather mask  2.  I have contacted the attending physician, Dr. Charlie Persaud, and he has decided to keep the patient on the LTAC floor.  I have offered transfer to the medical floors as necessary, including ICU as necessary (the patient does have clinical instability at this time with tachycardia, hypoxia, hypotension, and significant fatigue)  3.  I will leave it to the attending physician to organize today CTPA  4.  I have ordered a therapeutic dose of Lovenox dosing of 1 mg/kg      Care Planning  Shared decision making: With the patient and his significant other  Code status and discussions: Full code      Giorgi Moy MD    Electronically signed by Giorgi Moy MD, 04/25/23, 10:20 AM CDT.

## 2023-04-25 NOTE — ACP (ADVANCE CARE PLANNING)
[facility]  [address] [place] [address]. [address]  T - [phone]  F  [phone]     Consultation         PATIENT  DEMOGRAPHICS   PATIENT NAME: Emre Lisa                      PHYSICIAN: WALTER Schulz  : 1960  MRN: [id number]    Subjective   SUBJECTIVE   Referring Provider: Dr. Persaud  Reason for Consultation: BITA  History of present illness: This is a 63-year-old male with a past medical history significant for DM 2, hypertension, CAD, neuropathy who was originally admitted to the podiatry service after undergoing repair of Charcot deformity of his left foot.  He had external fixator placed and was transferred to [facility] for further antibiotic therapy.  Unfortunately he has now developed some acute kidney injury and nephrology is consulted to help manage.    Past Medical History:   Diagnosis Date   • Arthritis    • Atrial fibrillation    • Diabetes mellitus    • Diabetic foot ulcer associated with type 2 diabetes mellitus     left great toe   • Hallux malleus of left foot    • Hammer toe    • Hypertension    • MI (myocardial infarction)    • Neuropathy in diabetes    • Onychomycosis    • Presence of biventricular cardiac pacemaker    • Rheumatoid arthritis      Past Surgical History:   Procedure Laterality Date   • AMPUTATION DIGIT Right 3/5/2017    Procedure: RIGHT AMPUTATION TRANSMETATRASAL , RECESSION GASTROCNEMOUS;  Surgeon: Marco A Thompson DPM;  Location: [facility] OR;  Service:    • CARDIAC DEFIBRILLATOR PLACEMENT  ,    • CARPAL TUNNEL RELEASE      elbow and wrist left arm   • FOOT IRRIGATION, DEBRIDEMENT AND REPAIR Left 3/7/2018    Procedure: FOOT IRRIGATION, DEBRIDEMENT AND REPAIR;  Surgeon: Marco A Thompson DPM;  Location: [facility] OR;  Service:    • FOOT IRRIGATION, DEBRIDEMENT AND REPAIR Left 3/10/2018    Procedure: FOOT IRRIGATION, DEBRIDEMENT AND REPAIR;  Surgeon: Marco A Thompson DPM;  Location: [facility];  Service: Podiatry   • FOOT WOUND CLOSURE Right  3/2/2017    Procedure: INCISION AND DRAINAGE, RIGHT FOOT;  Surgeon: Tung Rosenthal DPM;  Location: [facility] OR;  Service:    • HAMMER TOE REPAIR Left 2/15/2018    Procedure: HAMMERTOE CORRECTION LEFT SECOND, THIRD AND FOURTH TOES AND HALLUX INTERPHALANGEAL JOINT ARTHRODESIS LEFT FOOT (Micro Asnis, 4.0 & 5.0 Asnis)      (c-arm);  Surgeon: Marco A Thompson DPM;  Location: [facility] OR;  Service:    • HARDWARE REMOVAL Left 3/5/2018    Procedure: ANKLE/FOOT HARDWARE REMOVAL;  Surgeon: Marco A Thompson DPM;  Location: [facility] OR;  Service:    • INCISION AND DRAINAGE LEG Left 3/5/2018    Procedure: INCISION AND DRAINAGE LOWER EXTREMITY;  Surgeon: Marco A Thompson DPM;  Location: [facility] OR;  Service:    • PLANTAR FASCIA RELEASE Left 11/21/2019    Procedure: PLANTAR PLANING LEFT FOOT AND ALL OTHER INDICATED PROCEDURES;  Surgeon: Marco A Thompson DPM;  Location: [facility] OR;  Service: Podiatry   • TOE AMPUTATION Right 2016   • TONSILECTOMY, ADENOIDECTOMY, BILATERAL MYRINGOTOMY AND TUBES      age 25     Family History   Problem Relation Age of Onset   • Diabetes Father    • Stroke Father    • No Known Problems Maternal Grandmother    • No Known Problems Maternal Grandfather    • No Known Problems Paternal Grandmother    • No Known Problems Paternal Grandfather    • No Known Problems Daughter    • No Known Problems Daughter    • No Known Problems Son    • Heart disease Other    • Hypertension Other      Social History     Tobacco Use   • Smoking status: Never   • Smokeless tobacco: Never   Vaping Use   • Vaping Use: Never used   Substance Use Topics   • Alcohol use: Yes     Comment: social   • Drug use: No     Allergies:  Januvia [sitagliptin], Lipitor [atorvastatin], Shellfish allergy, and Sulfa antibiotics     REVIEW OF SYSTEMS    Review of Systems   Genitourinary: Positive for decreased urine volume and difficulty urinating.   All other systems reviewed and are negative.      Objective   OBJECTIVE   Vital Signs  Heart Rate:   [102-107] 107         No intake/output data recorded.    PHYSICAL EXAM    Physical Exam  Constitutional:       Appearance: He is well-developed.   HENT:      Head: Normocephalic and atraumatic.   Eyes:      Conjunctiva/sclera: Conjunctivae normal.      Pupils: Pupils are equal, round, and reactive to light.   Cardiovascular:      Rate and Rhythm: Normal rate and regular rhythm.   Pulmonary:      Effort: Pulmonary effort is normal.      Breath sounds: Normal breath sounds.   Abdominal:      Palpations: Abdomen is soft.   Musculoskeletal:      Cervical back: Neck supple.      Right lower leg: No edema.      Left lower leg: No edema.   Skin:     General: Skin is warm and dry.   Neurological:      Mental Status: He is alert and oriented to person, place, and time.   Psychiatric:         Mood and Affect: Mood normal.         Behavior: Behavior normal.         RESULTS   Results Review:    Results from last 7 days   Lab Units 04/25/23  0739 04/24/23  0205 04/23/23  0644   SODIUM mmol/L 133* 134* 136   POTASSIUM mmol/L 4.5 4.3 4.6   CHLORIDE mmol/L 99 99 102   CO2 mmol/L 15.0* 23.0 23.0   BUN mg/dL 34* 24* 18   CREATININE mg/dL 1.39* 1.17 1.11   CALCIUM mg/dL 8.5* 9.0 8.7   BILIRUBIN mg/dL 0.7 0.4 0.5   ALK PHOS U/L 88 85 76   ALT (SGPT) U/L 101* 62* 66*   AST (SGOT) U/L 69* 31 42*   GLUCOSE mg/dL 305* 160* 193*       Estimated Creatinine Clearance: 90 mL/min (A) (by C-G formula based on SCr of 1.39 mg/dL (H)).    Results from last 7 days   Lab Units 04/24/23  0205 04/23/23  0644   MAGNESIUM mg/dL 2.0 1.9             Results from last 7 days   Lab Units 04/24/23  0205 04/23/23  0644 04/21/23  0530 04/20/23  0617 04/19/23  0457   WBC 10*3/mm3 6.20 6.33 5.25 4.90 5.15   HEMOGLOBIN g/dL 10.1* 10.2* 9.9* 10.3* 9.5*   PLATELETS 10*3/mm3 143 155 156 155 136*              MEDICATIONS    ascorbic acid, 1,000 mg, Oral, Nightly  cetirizine, 10 mg, Oral, Daily  vitamin D3, 5,000 Units, Oral, Nightly  dilTIAZem, 5 mg, Intravenous,  Once  docusate sodium, 100 mg, Oral, BID  dronedarone, 400 mg, Oral, Daily With Dinner  enoxaparin, 1 mg/kg, Subcutaneous, Once  furosemide, 20 mg, Intravenous, Once  furosemide, 40 mg, Intravenous, Once  glipizide, 10 mg, Oral, QAM AC  [START ON 4/26/2023] heparin (porcine), 5,000 Units, Subcutaneous, Q8H  Insulin Aspart, 2-12 Units, Subcutaneous, 4x Daily With Meals & Nightly  iopamidol, 100 mL, Intravenous, Once in imaging  magnesium oxide, 400 mg, Oral, BID  metFORMIN, 1,000 mg, Oral, BID With Meals  metoprolol succinate XL, 25 mg, Oral, BID  midodrine, 5 mg, Oral, Once  senna-docusate sodium, 2 tablet, Oral, BID  sodium chloride, 3 mL, Intravenous, Q12H  sodium chloride, 75 mL/hr, Intravenous, Once  vancomycin, 1,250 mg, Intravenous, Q12H      Pharmacy to dose vancomycin,   sodium chloride, 100 mL/hr      Medications Prior to Admission   Medication Sig Dispense Refill Last Dose   • ascorbic acid (VITAMIN C) 1000 MG tablet Take 1 tablet by mouth Every Night.      • Bacillus Coagulans-Inulin (Probiotic) 1-250 BILLION-MG capsule Take 1 capsule by mouth 2 (Two) Times a Day. 30 capsule 1    • Blood Glucose Monitoring Suppl (ONE TOUCH ULTRA 2) w/Device kit       • Cholecalciferol 125 MCG (5000 UT) tablet Take 1 tablet by mouth Every Night.      • clindamycin (CLEOCIN) 300 MG capsule Take 1 capsule by mouth 3 (Three) Times a Day. 30 capsule 0    • dronedarone (MULTAQ) 400 MG tablet Take 1 tablet by mouth Every Night.      • fexofenadine (ALLEGRA) 180 MG tablet Take 1 tablet by mouth Every Night.      • fluticasone (FLONASE) 50 MCG/ACT nasal spray 2 sprays into the nostril(s) as directed by provider As Needed for Rhinitis.      • furosemide (Lasix) 20 MG tablet Take 1 tablet by mouth Daily. 10 tablet 0    • glimepiride (AMARYL) 4 MG tablet Take 1 tablet by mouth Every Night.      • losartan (COZAAR) 25 MG tablet Take 1 tablet by mouth Daily.      • magnesium oxide (MAG-OX) 400 MG tablet Take 1 tablet by mouth 2 (Two)  Times a Day.      • metFORMIN (GLUCOPHAGE) 1000 MG tablet Take 1 tablet by mouth 2 (Two) Times a Day With Meals.      • metoprolol succinate XL (TOPROL-XL) 25 MG 24 hr tablet Take 1 tablet by mouth 2 (Two) Times a Day. 0.5 tablet      • multivitamin (THERAGRAN) tablet tablet Take  by mouth Every Night.      • ONE TOUCH ULTRA TEST test strip USE TO TEST BLOOD SUGAR THREE TIMES A DAY  1    • vancomycin 1750 mg/500 mL 0.9% NS IVPB ([facility]) Infuse 500 mL into a venous catheter Every 12 (Twelve) Hours for 70 doses. Indications: Bone and/or Joint Infection 52424 mL 1    • Zinc 50 MG tablet Take 1 tablet by mouth Every Night.        Assessment & Plan   ASSESSMENT / PLAN      Dyspnea    Syncope    Acute respiratory failure with hypoxia    Tachycardia    1.  BITA- baseline creatinine around 0.9-1.0, up to 1.39.  He reports he had some urinary retention while admitted to [facility] and now complains of difficulty with urination and a similar feeling.  We will check bladder scan.  Also check urine studies and renal ultrasound.  In addition he is receiving vancomycin and will check Marino stain.    -We will add some gentle IV hydration with sodium bicarbonate drip.    2.  Metabolic acidosis- bicarb drip as above    3.  Recent surgery for Charcot left foot    4.  DM2    5.  A-fib    6.  Hypertension    7.  History of rheumatoid arthritis      Thank you for the consult, we will continue to follow the patient.         I discussed the patients findings and my recommendations with patient and family      This document has been electronically signed by WALTER Schulz on April 25, 2023 13:24 CDT      For this patient encounter, I have reviewed the Nurse Practitioner's documentation, medical decision making, and treatment plan and personally spent time with the patient.seen on 4/25/23 and soniya patient and family

## 2023-04-25 NOTE — PROGRESS NOTES
Pharmacokinetics by Pharmacy - Vancomycin    Emre Lisa is a 63 y.o. male receiving vancomycin 1250mg IV Q12H day 12 for  Charcot ankle    Objective:  [Ht:  ; Wt:  ]     WBC   Date Value Ref Range Status   04/24/2023 6.20 3.40 - 10.80 10*3/mm3 Final   04/23/2023 6.33 3.40 - 10.80 10*3/mm3 Final   04/21/2023 5.25 3.40 - 10.80 10*3/mm3 Final      C-Reactive Protein   Date Value Ref Range Status   04/13/2023 1.26 (H) 0.00 - 0.50 mg/dL Final   01/26/2022 <0.30 0.00 - 0.50 mg/dL Final   01/07/2022 19.20 (H) 0.00 - 0.50 mg/dL Final     Lactate   Date Value Ref Range Status   03/11/2016 1.5 0.5 - 2.0 mmol/L Final      Temp Readings from Last 1 Encounters:   04/21/23 98.6 °F (37 °C)     Estimated Creatinine Clearance: 90 mL/min (A) (by C-G formula based on SCr of 1.39 mg/dL (H)).   Creatinine   Date Value Ref Range Status   04/25/2023 1.39 (H) 0.76 - 1.27 mg/dL Final   04/24/2023 1.17 0.76 - 1.27 mg/dL Final   04/23/2023 1.11 0.76 - 1.27 mg/dL Final   11/08/2022 1.0 0.7 - 1.3 mg/dL Final       Vancomycin Peak   Date Value Ref Range Status   04/23/2023 39.60 20.00 - 40.00 mcg/mL Final   04/17/2023 29.40 20.00 - 40.00 mcg/mL Final     Vancomycin Trough   Date Value Ref Range Status   04/24/2023 18.40 5.00 - 20.00 mcg/mL Final   04/17/2023 14.40 5.00 - 20.00 mcg/mL Final       Culture Results:  Microbiology Results (last 10 days)       ** No results found for the last 240 hours. **          No results found for: RESPCX      Assessment:  Vancomycin levels collected yesterday were supratherapeutic and dose was decreased  SCr increased to 1.39 today - will follow  No new CBC to assess trends  Consulted to 5/26    Plan:  1. Continue vancomycin 1250mg IV Q12H  2.  Anticipate repeat levels later this week unless SCr continues to increase  3. Pharmacy will monitor renal function and adjust dose accordingly.      Raphael Linares, PharmD   04/25/23 10:13 CDT

## 2023-04-26 ENCOUNTER — APPOINTMENT (OUTPATIENT)
Dept: CT IMAGING | Facility: HOSPITAL | Age: 63
End: 2023-04-26
Payer: COMMERCIAL

## 2023-04-26 ENCOUNTER — APPOINTMENT (OUTPATIENT)
Dept: ULTRASOUND IMAGING | Facility: HOSPITAL | Age: 63
End: 2023-04-26
Payer: COMMERCIAL

## 2023-04-26 ENCOUNTER — OUTSIDE FACILITY SERVICE (OUTPATIENT)
Dept: PULMONOLOGY | Facility: CLINIC | Age: 63
End: 2023-04-26
Payer: MEDICARE

## 2023-04-26 LAB
ANION GAP SERPL CALCULATED.3IONS-SCNC: 12 MMOL/L (ref 5–15)
APTT PPP: 30.3 SECONDS (ref 20–40.3)
BASOPHILS # BLD AUTO: 0.05 10*3/MM3 (ref 0–0.2)
BASOPHILS NFR BLD AUTO: 0.7 % (ref 0–1.5)
BUN SERPL-MCNC: 33 MG/DL (ref 8–23)
BUN/CREAT SERPL: 28.2 (ref 7–25)
CALCIUM SPEC-SCNC: 8 MG/DL (ref 8.6–10.5)
CHLORIDE SERPL-SCNC: 98 MMOL/L (ref 98–107)
CO2 SERPL-SCNC: 20 MMOL/L (ref 22–29)
CREAT SERPL-MCNC: 1.17 MG/DL (ref 0.76–1.27)
CREAT UR-MCNC: 222.9 MG/DL
DEPRECATED RDW RBC AUTO: 46.8 FL (ref 37–54)
EGFRCR SERPLBLD CKD-EPI 2021: 70.9 ML/MIN/1.73
EOSINOPHIL # BLD AUTO: 0.04 10*3/MM3 (ref 0–0.4)
EOSINOPHIL NFR BLD AUTO: 0.5 % (ref 0.3–6.2)
ERYTHROCYTE [DISTWIDTH] IN BLOOD BY AUTOMATED COUNT: 14.6 % (ref 12.3–15.4)
GLUCOSE BLDC GLUCOMTR-MCNC: 167 MG/DL (ref 70–130)
GLUCOSE BLDC GLUCOMTR-MCNC: 172 MG/DL (ref 70–130)
GLUCOSE BLDC GLUCOMTR-MCNC: 195 MG/DL (ref 70–130)
GLUCOSE SERPL-MCNC: 184 MG/DL (ref 65–99)
HCT VFR BLD AUTO: 29.9 % (ref 37.5–51)
HGB BLD-MCNC: 10.1 G/DL (ref 13–17.7)
HOLD SPECIMEN: NORMAL
IMM GRANULOCYTES # BLD AUTO: 0.08 10*3/MM3 (ref 0–0.05)
IMM GRANULOCYTES NFR BLD AUTO: 1.1 % (ref 0–0.5)
INR PPP: 1.81 (ref 0.8–1.2)
LYMPHOCYTES # BLD AUTO: 1.32 10*3/MM3 (ref 0.7–3.1)
LYMPHOCYTES NFR BLD AUTO: 18.1 % (ref 19.6–45.3)
MCH RBC QN AUTO: 30.4 PG (ref 26.6–33)
MCHC RBC AUTO-ENTMCNC: 33.8 G/DL (ref 31.5–35.7)
MCV RBC AUTO: 90.1 FL (ref 79–97)
MONOCYTES # BLD AUTO: 0.67 10*3/MM3 (ref 0.1–0.9)
MONOCYTES NFR BLD AUTO: 9.2 % (ref 5–12)
NEUTROPHILS NFR BLD AUTO: 5.12 10*3/MM3 (ref 1.7–7)
NEUTROPHILS NFR BLD AUTO: 70.4 % (ref 42.7–76)
NRBC BLD AUTO-RTO: 0.7 /100 WBC (ref 0–0.2)
PLATELET # BLD AUTO: 92 10*3/MM3 (ref 140–450)
PMV BLD AUTO: 11.2 FL (ref 6–12)
POTASSIUM SERPL-SCNC: 4.6 MMOL/L (ref 3.5–5.2)
PROTHROMBIN TIME: 21 SECONDS (ref 11.1–15.3)
RBC # BLD AUTO: 3.32 10*6/MM3 (ref 4.14–5.8)
SODIUM SERPL-SCNC: 130 MMOL/L (ref 136–145)
WBC NRBC COR # BLD: 7.28 10*3/MM3 (ref 3.4–10.8)

## 2023-04-26 PROCEDURE — 85610 PROTHROMBIN TIME: CPT | Performed by: INTERNAL MEDICINE

## 2023-04-26 PROCEDURE — 82962 GLUCOSE BLOOD TEST: CPT

## 2023-04-26 PROCEDURE — 80048 BASIC METABOLIC PNL TOTAL CA: CPT | Performed by: NURSE PRACTITIONER

## 2023-04-26 PROCEDURE — 25010000002 VANCOMYCIN 10 G RECONSTITUTED SOLUTION

## 2023-04-26 PROCEDURE — 85730 THROMBOPLASTIN TIME PARTIAL: CPT | Performed by: INTERNAL MEDICINE

## 2023-04-26 PROCEDURE — 25510000001 IOPAMIDOL PER 1 ML: Performed by: INTERNAL MEDICINE

## 2023-04-26 PROCEDURE — 76775 US EXAM ABDO BACK WALL LIM: CPT

## 2023-04-26 PROCEDURE — 25010000002 ENOXAPARIN PER 10 MG: Performed by: INTERNAL MEDICINE

## 2023-04-26 PROCEDURE — 71275 CT ANGIOGRAPHY CHEST: CPT

## 2023-04-26 PROCEDURE — P9041 ALBUMIN (HUMAN),5%, 50ML: HCPCS | Performed by: NURSE PRACTITIONER

## 2023-04-26 PROCEDURE — 85025 COMPLETE CBC W/AUTO DIFF WBC: CPT | Performed by: INTERNAL MEDICINE

## 2023-04-26 PROCEDURE — 25010000002 ALBUMIN HUMAN 5% PER 50 ML: Performed by: NURSE PRACTITIONER

## 2023-04-26 RX ORDER — HEPARIN SODIUM 10000 [USP'U]/100ML
18 INJECTION, SOLUTION INTRAVENOUS
Status: DISCONTINUED | OUTPATIENT
Start: 2023-04-26 | End: 2023-04-26

## 2023-04-26 RX ORDER — HEPARIN SODIUM 5000 [USP'U]/ML
40 INJECTION, SOLUTION INTRAVENOUS; SUBCUTANEOUS AS NEEDED
Status: DISCONTINUED | OUTPATIENT
Start: 2023-04-26 | End: 2023-04-26

## 2023-04-26 RX ORDER — TRAZODONE HYDROCHLORIDE 50 MG/1
50 TABLET ORAL NIGHTLY PRN
Status: DISCONTINUED | OUTPATIENT
Start: 2023-04-26 | End: 2023-05-15 | Stop reason: HOSPADM

## 2023-04-26 RX ORDER — ALBUMIN, HUMAN INJ 5% 5 %
250 SOLUTION INTRAVENOUS EVERY 12 HOURS
Status: DISPENSED | OUTPATIENT
Start: 2023-04-26 | End: 2023-04-27

## 2023-04-26 RX ORDER — HEPARIN SODIUM 5000 [USP'U]/ML
10000 INJECTION, SOLUTION INTRAVENOUS; SUBCUTANEOUS AS NEEDED
Status: DISCONTINUED | OUTPATIENT
Start: 2023-04-26 | End: 2023-04-26

## 2023-04-26 RX ORDER — SODIUM CHLORIDE 9 MG/ML
100 INJECTION, SOLUTION INTRAVENOUS
Status: COMPLETED | OUTPATIENT
Start: 2023-04-26 | End: 2023-04-26

## 2023-04-26 RX ORDER — HEPARIN SODIUM 5000 [USP'U]/ML
10000 INJECTION, SOLUTION INTRAVENOUS; SUBCUTANEOUS ONCE
Status: DISCONTINUED | OUTPATIENT
Start: 2023-04-26 | End: 2023-04-26

## 2023-04-26 RX ORDER — ENOXAPARIN SODIUM 100 MG/ML
1 INJECTION SUBCUTANEOUS EVERY 12 HOURS
Status: DISCONTINUED | OUTPATIENT
Start: 2023-04-26 | End: 2023-04-28

## 2023-04-26 RX ORDER — HYDROXYZINE PAMOATE 25 MG/1
25 CAPSULE ORAL 3 TIMES DAILY PRN
Status: DISCONTINUED | OUTPATIENT
Start: 2023-04-26 | End: 2023-05-15 | Stop reason: HOSPADM

## 2023-04-26 RX ADMIN — SODIUM CHLORIDE 100 ML: 9 INJECTION, SOLUTION INTRAVENOUS at 08:12

## 2023-04-26 RX ADMIN — IOPAMIDOL 90 ML: 755 INJECTION, SOLUTION INTRAVENOUS at 08:11

## 2023-04-26 NOTE — ACP (ADVANCE CARE PLANNING)
[facility]  [address] [place] [address]. [address]  T - [phone]  F - [phone]     Progress Note          PATIENT  DEMOGRAPHICS   PATIENT NAME: Emre Lisa                      PHYSICIAN: Dylan PerezWALTER  : 1960  MRN: [id number]   LOS: 0 days    Patient Care Team:  Jamaal Bravo MD as PCP - General  Marco A Thompson DPM as Consulting Physician (Podiatry)  Subjective   SUBJECTIVE   No acute events overnight         Objective   OBJECTIVE   Vital Signs  Heart Rate:  [] 101     T98.2    RR 22  /63  O2 sat 97%    Flowsheet Rows    Flowsheet Row First Filed Value   Admission Height --   Admission Weight 170 kg (375 lb 11.2 oz) Documented at 2023 0900           No intake/output data recorded.    PHYSICAL EXAM    Physical Exam  Constitutional:       Appearance: He is well-developed.   HENT:      Head: Normocephalic and atraumatic.   Eyes:      Conjunctiva/sclera: Conjunctivae normal.      Pupils: Pupils are equal, round, and reactive to light.   Cardiovascular:      Rate and Rhythm: Normal rate and regular rhythm.   Pulmonary:      Effort: Pulmonary effort is normal.      Breath sounds: Normal breath sounds.   Abdominal:      Palpations: Abdomen is soft.   Musculoskeletal:      Cervical back: Neck supple.      Right lower leg: No edema.      Left lower leg: No edema.   Skin:     General: Skin is warm and dry.   Neurological:      Mental Status: He is alert and oriented to person, place, and time.   Psychiatric:         Mood and Affect: Mood normal.         Behavior: Behavior normal.         RESULTS   Results Review:    Results from last 7 days   Lab Units 23  0557 23  0739 23  0205 23  0644   SODIUM mmol/L 130* 133* 134* 136   POTASSIUM mmol/L 4.6 4.5 4.3 4.6   CHLORIDE mmol/L 98 99 99 102   CO2 mmol/L 20.0* 15.0* 23.0 23.0   BUN mg/dL 33* 34* 24* 18   CREATININE mg/dL 1.17 1.39* 1.17 1.11   CALCIUM mg/dL 8.0* 8.5* 9.0 8.7    BILIRUBIN mg/dL  --  0.7 0.4 0.5   ALK PHOS U/L  --  88 85 76   ALT (SGPT) U/L  --  101* 62* 66*   AST (SGOT) U/L  --  69* 31 42*   GLUCOSE mg/dL 184* 305* 160* 193*       Estimated Creatinine Clearance: 107.8 mL/min (by C-G formula based on SCr of 1.17 mg/dL).    Results from last 7 days   Lab Units 04/24/23  0205 04/23/23  0644   MAGNESIUM mg/dL 2.0 1.9             Results from last 7 days   Lab Units 04/26/23  1130 04/24/23  0205 04/23/23  0644 04/21/23  0530 04/20/23  0617   WBC 10*3/mm3 7.28 6.20 6.33 5.25 4.90   HEMOGLOBIN g/dL 10.1* 10.1* 10.2* 9.9* 10.3*   PLATELETS 10*3/mm3 92* 143 155 156 155       Results from last 7 days   Lab Units 04/26/23  1130   INR  1.81*         Imaging Results (Last 24 Hours)     Procedure Component Value Units Date/Time    US Renal Bilateral [[id number]] Collected: 04/26/23 0820     Updated: 04/26/23 1208    Narrative:      HISTORY:  Urinary retention.    COMPARISON:  None    TECHNIQUE:  Real-time ultrasound imaging and color Doppler used to evaluate the kidneys and  bladder.    FINDINGS:  The right kidney measures 13.1 x 5.9 x 4.5 cm. The left kidney measures 13 x 5.8  x 6.5 cm.  Cortical thickness and echotexture are within normal limits. There is  no hydronephrosis or evidence of mass. No calculus was seen.  Lower pole cyst on  the right measuring 4.9 x 4.1 x 4.6 cm.  This appears to be an anechoic  partially exophytic cyst.    The bladder was collapsed around a Colbert catheter.      Impression:      A 5 cm lower pole right renal cyst appears simple.  No acute sonographic  abnormality.      CT Angiogram Chest [[id number]] Collected: 04/26/23 0831     Updated: 04/26/23 1137    Narrative:      EXAM:  CTA chest with contrast, PE protocol.    COMPARISON:  CTA chest PE protocol, 04/24/2023.    HISTORY:  Pulmonary embolism suspected, unknown D-dimer.  Shortness of breath.    TECHNIQUE:  Intravenous contrast was administered and axial images from the thoracic inlet  through the  diaphragm were performed followed by 2D multiplanar reformats.  MIPS  were reconstructed and reviewed.    FINDINGS:  Limited by respiratory motion artifact.    Mild bilateral dependent atelectasis.  Lungs are otherwise clear.  No  consolidation.    Trace bilateral pleural effusions.  Esophagus is collapsed and poorly evaluated.  Mild bilateral gynecomastia.  There is some mild lymphadenopathy suspected in  the upper mediastinum, nonspecific.  No lymphadenopathy identified elsewhere in  the chest by size criteria.    Extensive bilateral pulmonary emboli with saddle embolus extending across the  bifurcation of the main pulmonary artery.  There is extensive embolic disease  throughout all lobes of both lungs. The Main  pulmonary artery is enlarged  measuring up to 4.6 cm in diameter.  There is abnormal flattening or even  leftward convexity of the intraventricular septum.    Limited evaluation of the upper abdomen due to motion.  No gross acute osseous  abnormality.      Impression:      1.  Extensive bilateral pulmonary emboli with saddle embolus extending across  the main pulmonary artery bifurcation.  There is enlargement of the main  pulmonary artery as well as leftward bowing or convexity of the intraventricular  septum.  These findings may be seen in the setting of right heart strain.    2.  The exam is otherwise limited by motion.  There is mild dependent  atelectasis.  Lungs are otherwise grossly clear.    3.  Mild lymphadenopathy in the upper mediastinum, nonspecific.  Consider  reevaluation on short-term follow-up CT chest.               MEDICATIONS    ascorbic acid, 1,000 mg, Oral, Nightly  cetirizine, 10 mg, Oral, Daily  vitamin D3, 5,000 Units, Oral, Nightly  dilTIAZem, 5 mg, Intravenous, Once  docusate sodium, 100 mg, Oral, BID  dronedarone, 400 mg, Oral, Daily With Dinner  glipizide, 10 mg, Oral, QAM AC  heparin (porcine), 10,000 Units, Intravenous, Once  Insulin Aspart, 2-12 Units, Subcutaneous, 4x  Daily With Meals & Nightly  iopamidol, 100 mL, Intravenous, Once in imaging  magnesium oxide, 400 mg, Oral, BID  metoprolol succinate XL, 25 mg, Oral, BID  midodrine, 5 mg, Oral, Once  senna-docusate sodium, 2 tablet, Oral, BID  sodium chloride, 3 mL, Intravenous, Q12H  tamsulosin, 0.4 mg, Oral, Nightly  vancomycin, 1,250 mg, Intravenous, Q12H      heparin, 18 Units/kg/hr  Pharmacy to dose vancomycin,   sodium bicarbonate drip less than/equal to 75 mEq/bag, 75 mEq        Assessment & Plan   ASSESSMENT / PLAN      Dyspnea    Syncope    Acute respiratory failure with hypoxia    Tachycardia    1.  BITA- baseline creatinine around 0.9-1.0, up to 1.39.  He reports he had some urinary retention while admitted to [facility] and now complains of difficulty with urination and a similar feeling.  Del Toro was placed. In addition he is receiving vancomycin and Marino is negative.     -US unremarkable, bladder scan was >200 ml and Del Toro was placed, urine sodium low    -Off IVF, add some albumin x 2 doses. Add flomax, D/C del toro tomorrow     2.  Metabolic acidosis- bicarb drip as above     3.  Recent surgery for Charcot left foot     4.  DM2     5.  A-fib     6.  Hypertension     7.  History of rheumatoid arthritis    8.  Extensive pulmonary emboli      Copied text in this note has been reviewed and is accurate as of 4/26/2023           This document has been electronically signed by WALTER Schulz on April 26, 2023 13:33 CDT

## 2023-04-26 NOTE — ACP (ADVANCE CARE PLANNING)
NUTRITION ASSESSMENT    Reason for assessment:  Follow up    Nutrition Diet/History:    -Typical Intake:  Decline in intake since seizure like event 4/25    Weight history:  CBW:  375.7#    Labs:  Na 130, Glu 184, BUN 33,     Meds:  Vit C, D3, colace, glipizide, ssi, mag-ox, pericolace, vancomycin, sodium bicarb 75 mEq    Nutrition Prescription Order:  Consistent carb    Evaluation of Nutrition Intake:    4/24:  75x2, 50%  4/25:  0x1  4/26:  25x1    Pt had critical event on 4/25 and was documented as pt was having seizure like activity with low  O2 stats.  Pt has had a decline in intake since the event.  RD will add milk TID to help with intake.  Unknown last BM - pt has not had one at least since 4/23 but RD could not find other paper in pt's chart.  RD will follow hospital course.

## 2023-04-26 NOTE — ACP (ADVANCE CARE PLANNING)
[facility]  INPATIENT PROGRESS NOTE    PATIENT NAME: Emre Lisa      PHYSICIAN: Charlie Persaud MD  : 1960        MRN: [id number]  Patient Care Team:  Jamaal Bravo MD as PCP - Marco A Chauhan DPM as Consulting Physician (Podiatry)    Chief Complaint:  Continued antibiotic therapy and medical management  History of Present Illness: Patient is a 64 y/o male with a pmh of type 2 DM, hypertension, CAD, and neuropathy.  He recently underwent surgical repair of Charcot deformity with external fixator stabilization to his left foot related to cellulitis.  He tolerated the surgical procedure well.  He will need IV antibiotic therapy through 23 due to bone/joint infection.  He remains non-weight bearing of left lower extremity.  He tells me the pain to his left foot remains controlled, however, he does have a torn left rotator cuff and arthritis so his hip and shoulder are the source of his pain.  He tells me he is doing well and he has no other complaints or concerns.  I have reviewed the patients most recent labs and diagnostics independently.  Glucose 124, BUN 15, creatinine 0.89, sodium 138, potassium 4.2, chloride 105, co2 24, wbc 5.25, hgb 9.9, hct 30.1, platelets 156.       Assessment      Cellulitis of left foot  Charcot's joint of left foot  Type 2 DM  Arthritis  A-fib  Hypertension  Rheumatoid arthritis  DVT & GI prophylaxis     DVT Prophylaxis: Heparin  GI Prophylaxis: Protonix  Code Status: Full    Plan     -Patient remains stable at this time  -CTA of chest independently reviewed.   -Collaboration of cardiology and pulmonology.  We will d/c eliquis and start heparin drip per recommendations.   -We will contact [facility] for cardiothoracic services  -Remains on AirVo 55L and 60%  -We will hold off on therapy at this time.  -More awake and alert today.  -IV Vancomycin as before with pharmacy to  dose  -Telemetry  -Strict I&O  -We will follow cardiology, nephrology, and pulmonology recommendations.  -Continues to require aggressive monitoring   -Renal function is improved.   -Patient and family is aware of patient diagnosis and prognosis.  All questions and concerns addressed to their satisfaction.  -If renal function remains stable, we will consider Lovenox vs heparin.  -We will continue to monitor labs closely  -DVT and GI prophylaxis in place.  -We'll continue monitoring patient in hospital setting and treat patient as course dictates.  -Please review orders for detailed plan of care.  -For Acute and Chronic medical condition we will continue medications described below in medication section which have been reviewed and we will continue unless changed in plan of care.  -Laboratory and diagnostic studies have been independently reviewed and the reports are reviewed as documented below.  -Total critical care time spent 37 minutes excluding time spent on separate billable procedures. Time spent does not coincide with other critical care providers. Patient is critically ill requiring continued close observation and management in ICU level setting primarily due to hypotensive/hypoxic respiratory failure. Time was exclusive to this patient and does not include time spent teaching, updating family or performing procedures.    Subjective   Interval History:   Patient Complaints:   Patient seen and examined.   Resting comfortably.  Patient is in chair position in bed.  No complaints or concerns at this time.  States he does feel somewhat better.  Wife remains at bedside.   History taken from: Patient, patient's chart.    Review of Systems:    Review of Systems   Constitutional: Positive for activity change. Negative for chills and fever.   HENT: Negative for postnasal drip and tinnitus.    Respiratory: Positive for chest tightness and shortness of breath. Negative for apnea.    Cardiovascular: Negative for chest  pain and palpitations.   Gastrointestinal: Negative for abdominal distention, nausea and vomiting.   Musculoskeletal: Positive for arthralgias and myalgias.   Skin: Positive for wound.   Neurological: Negative for tremors and seizures.   Psychiatric/Behavioral: Negative for agitation, confusion and hallucinations.       Objective   Vital Signs  Temp: 98.2 F         Heart Rate: 103         Resp: 20          Blood Pressure: 115/70         Pulse Ox: 93%    Physical Exam:   Physical Exam  Vitals reviewed.   Constitutional:       Appearance: Normal appearance. He is not ill-appearing.   HENT:      Head: Normocephalic and atraumatic.      Mouth/Throat:      Mouth: Mucous membranes are moist.      Pharynx: Oropharynx is clear.   Eyes:      Extraocular Movements: Extraocular movements intact.      Pupils: Pupils are equal, round, and reactive to light.   Neck:      Vascular: No carotid bruit.   Cardiovascular:      Rate and Rhythm: Regular rhythm. Tachycardia present.      Pulses: Normal pulses.      Heart sounds: Normal heart sounds. No murmur heard.    No gallop.   Pulmonary:      Effort: Pulmonary effort is normal. No respiratory distress.      Breath sounds: Normal breath sounds. No rales.   Abdominal:      General: Bowel sounds are normal. There is no distension.      Palpations: Abdomen is soft.      Tenderness: There is no guarding.   Musculoskeletal:         General: Normal range of motion.      Cervical back: Normal range of motion and neck supple.      Right lower leg: Edema present.      Left lower leg: Edema present.   Skin:     General: Skin is warm and dry.      Capillary Refill: Capillary refill takes less than 2 seconds.      Findings: Bruising present.      Comments: S/P L surgical repair Charcot foot with external fixator.  Wound vac in place   Neurological:      General: No focal deficit present.      Mental Status: He is alert and oriented to person, place, and time.   Psychiatric:         Mood and  Affect: Mood normal.         Behavior: Behavior normal.         Thought Content: Thought content normal.         Judgment: Judgment normal.       Results Review:       Results from last 7 days   Lab Units 04/26/23  0557 04/25/23  0739 04/24/23  0205 04/23/23  0644 04/21/23  0530 04/20/23  0617   SODIUM mmol/L 130* 133* 134* 136 138 136   POTASSIUM mmol/L 4.6 4.5 4.3 4.6 4.2 4.0   CHLORIDE mmol/L 98 99 99 102 105 103   CO2 mmol/L 20.0* 15.0* 23.0 23.0 24.0 24.0   BUN mg/dL 33* 34* 24* 18 15 15   CREATININE mg/dL 1.17 1.39* 1.17 1.11 0.89 0.94   GLUCOSE mg/dL 184* 305* 160* 193* 124* 114*   CALCIUM mg/dL 8.0* 8.5* 9.0 8.7 8.5* 8.6   BILIRUBIN mg/dL  --  0.7 0.4 0.5  --   --    ALK PHOS U/L  --  88 85 76  --   --    ALT (SGPT) U/L  --  101* 62* 66*  --   --    AST (SGOT) U/L  --  69* 31 42*  --   --      Results from last 7 days   Lab Units 04/24/23  0205 04/23/23  0644   MAGNESIUM mg/dL 2.0 1.9     Results from last 7 days   Lab Units 04/24/23  0205 04/23/23  0644 04/21/23  0530 04/20/23  0617   WBC 10*3/mm3 6.20 6.33 5.25 4.90   HEMOGLOBIN g/dL 10.1* 10.2* 9.9* 10.3*   HEMATOCRIT % 31.0* 30.8* 30.1* 31.7*   PLATELETS 10*3/mm3 143 155 156 155     Lab Results   Component Value Date    CKTOTAL 119 04/23/2023    CKMB 2.16 04/23/2023    TROPONINI <0.012 11/19/2014    TROPONINT 52 (H) 04/25/2023     pH, Arterial   Date Value Ref Range Status   04/25/2023 7.360 7.350 - 7.450 pH units Final     CO2   Date Value Ref Range Status   04/26/2023 20.0 (L) 22.0 - 29.0 mmol/L Final         Imaging Results (Most Recent)     Procedure Component Value Units Date/Time    CT Angiogram Chest [[id number]] Collected: 04/26/23 0831     Updated: 04/26/23 0841    Narrative:      EXAM:  CTA chest with contrast, PE protocol.    COMPARISON:  CTA chest PE protocol, 04/24/2023.    HISTORY:  Pulmonary embolism suspected, unknown D-dimer.  Shortness of breath.    TECHNIQUE:  Intravenous contrast was administered and axial images from the thoracic  inlet  through the diaphragm were performed followed by 2D multiplanar reformats.  MIPS  were reconstructed and reviewed.    FINDINGS:  Limited by respiratory motion artifact.    Mild bilateral dependent atelectasis.  Lungs are otherwise clear.  No  consolidation.    Trace bilateral pleural effusions.  Esophagus is collapsed and poorly evaluated.  Mild bilateral gynecomastia.  There is some mild lymphadenopathy suspected in  the upper mediastinum, nonspecific.  No lymphadenopathy identified elsewhere in  the chest by size criteria.    Extensive bilateral pulmonary emboli with saddle embolus extending across the  bifurcation of the main pulmonary artery.  There is extensive embolic disease  throughout all lobes of both lungs. Main pulmonary artery is enlarged measuring  up to 4.6 cm in diameter.  There is abnormal flattening or even leftward  convexity of the intraventricular septum.    Limited evaluation of the upper abdomen due to motion.  No gross acute osseous  abnormality.      Impression:      1.  Extensive bilateral pulmonary emboli with saddle embolus extending across  the main pulmonary artery bifurcation.  There is enlargement of the main  pulmonary artery as well as leftward bowing or convexity of the intraventricular  septum.  These findings may be seen in the setting of right heart strain.    2.  The exam is otherwise limited by motion.  There is mild dependent  atelectasis.  Lungs are otherwise grossly clear.    3.  Mild lymphadenopathy in the upper mediastinum, nonspecific.  Consider  reevaluation on short-term follow-up CT chest.        US Renal Bilateral [[id number]] Collected: 04/26/23 0820     Updated: 04/26/23 0827    Narrative:      HISTORY:  Urinary retention.    COMPARISON:  None    TECHNIQUE:  Real-time ultrasound imaging and color Doppler used to evaluate the kidneys and  bladder.    FINDINGS:  The right kidney measures 13.1 x 5.9 x 4.5 cm. The left kidney measures 13 x 5.8  x 6.5 cm.   Cortical thickness and echotexture are within normal limits. There is  no hydronephrosis or evidence of mass. No calculus was seen.  Lower pole cyst on  the right measuring 4.9 x 4.1 x 4.6 cm.  This appears to be an anechoic  partially exophytic cyst.    The bladder was collapsed around a Colbert catheter.      Impression:      A 5 cm lower pole right renal cyst appears simple.  No acute sonographic  abnormality.      XR Chest 1 View [[id number]] Collected: 04/25/23 0744     Updated: 04/25/23 0902    Narrative:      HISTORY:  Post code.    COMPARISON:  4/23/2023    FINDINGS:  AP view of the chest demonstrates clear lungs and mild cardiomegaly.  There is  no pneumothorax.  Left-sided AICD is unchanged.      Impression:      Mild cardiomegaly.      CT Angiogram Chest [[id number]] Collected: 04/24/23 0511     Updated: 04/24/23 0658    Narrative:      INDICATION:  Positive D-dimer    COMPARISON:  None    TECHNIQUE:  Volumetric CT scan of the chest, from the thoracic inlet to the level of the  diaphragms, with intravenous contrast.  2D axial, sagittal, and coronal  reformats were performed.  MIPS were performed on a separate workstation and  reviewed.    FINDINGS:  Lungs: No focal consolidation or mass.  Pleura: No effusion or pneumothorax.  Vessels: Thoracic aorta is normal in caliber.  Bones: No suspicious lesions.  Visualized upper abdomen: Unremarkable.      Impression:      I suspect there are bilateral pulmonary emboli, but it is difficult to tell with  certainty due to suboptimal contrast timing and significant motion artifact.  Repeat examination is recommended.    XR Chest 1 View [[id number]] Collected: 04/23/23 1630     Updated: 04/23/23 1648    Narrative:      HISTORY:  Shortness of breath.    FINDINGS:  Frontal film of the chest was obtained.  There is a dual-lead pacing system in  place via the left subclavian venous approach.  The pacing leads appear to be in  good position.  There is mild pulmonary vascular  congestion with mild  cardiomegaly.  No infiltrate or effusion is seen.      Impression:      1.  Mild cardiomegaly with mild pulmonary vascular congestion.    2.  No acute infiltrate or effusion is seen.         No results found for: ACANTHNAEG, AFBCX, BPERTUSSISCX, BLOODCX  No results found for: BCIDPCR, CXREFLEX, CSFCX, CULTURETIS  No results found for: CULTURES, HSVCX, URCX  No results found for: EYECULTURE, GCCX, HSVCULTURE, LABHSV  No results found for: LEGIONELLA, MRSACX, MUMPSCX, MYCOPLASCX  No results found for: NOCARDIACX, STOOLCX  No results found for: THROATCX, UNSTIMCULT, URINECX, CULTURE, VZVCULTUR  No results found for: VIRALCULTU, WOUNDCX  Medication Reviewed and Will Continue Unless Documented in Plan:   apixaban, 10 mg, Oral, Q12H   Followed by  [START ON 5/2/2023] apixaban, 5 mg, Oral, Q12H  ascorbic acid, 1,000 mg, Oral, Nightly  cetirizine, 10 mg, Oral, Daily  vitamin D3, 5,000 Units, Oral, Nightly  dilTIAZem, 5 mg, Intravenous, Once  docusate sodium, 100 mg, Oral, BID  dronedarone, 400 mg, Oral, Daily With Dinner  glipizide, 10 mg, Oral, QAM AC  Insulin Aspart, 2-12 Units, Subcutaneous, 4x Daily With Meals & Nightly  iopamidol, 100 mL, Intravenous, Once in imaging  magnesium oxide, 400 mg, Oral, BID  metoprolol succinate XL, 25 mg, Oral, BID  midodrine, 5 mg, Oral, Once  senna-docusate sodium, 2 tablet, Oral, BID  sodium chloride, 3 mL, Intravenous, Q12H  tamsulosin, 0.4 mg, Oral, Nightly  vancomycin, 1,250 mg, Intravenous, Q12H      Pharmacy to dose vancomycin,   sodium bicarbonate drip less than/equal to 75 mEq/bag, 75 mEq      •  acetaminophen  •  bisacodyl  •  calcium carbonate  •  fluticasone  •  hydrALAZINE  •  muscle rub  •  ondansetron  •  oxyCODONE-acetaminophen  •  Pharmacy to dose vancomycin  •  simethicone  •  sodium chloride  Pharmacy to dose vancomycin,   sodium bicarbonate drip less than/equal to 75 mEq/bag, 75 mEq       Dietary Orders (From admission, onward)     Start      Ordered    04/21/23 1334  Diet: Diabetic Diets; Consistent Carbohydrate; Texture: Regular Texture (IDDSI 7); Fluid Consistency: Thin (IDDSI 0)  Diet Effective Now        References:    Diet Order Crosswalk   Question Answer Comment   Diets: Diabetic Diets    Diabetic Diet: Consistent Carbohydrate    Texture: Regular Texture (IDDSI 7)    Fluid Consistency: Thin (IDDSI 0)        04/21/23 1333              History, physical exam, assessment and plan may have been partly or fully copied from before, but  changes made to the copied record to reflect care on the date of service. Part of the lab and imaging  reports auto populated and corrected. Some of this note may be an electronic transcription of spoken  language to printed text. This may permit erroneous, or at times, nonsensical words or phrases to be  inadvertently transcribed. Although I have reviewed the note for such errors, some may still exist.      This document has been electronically signed by Charlie Persaud MD on April 26, 2023 10:40 CDT

## 2023-04-26 NOTE — PROGRESS NOTES
Pharmacokinetics by Pharmacy - Vancomycin    Emre Lisa is a 63 y.o. male receiving vancomycin 1250 mg IV q12hr (day 13) for bone and/or joint infection.  Patient is also receiving ampicillin/sulbactam.    Objective:    Temp Readings from Last 1 Encounters:   04/21/23 98.6 °F (37 °C)     WBC   Date Value Ref Range Status   04/24/2023 6.20 3.40 - 10.80 10*3/mm3 Final    No results found for: CRP, LACTATE   Creatinine   Date Value Ref Range Status   04/26/2023 1.17 0.76 - 1.27 mg/dL Final   04/25/2023 1.39 (H) 0.76 - 1.27 mg/dL Final   04/24/2023 1.17 0.76 - 1.27 mg/dL Final       Vancomycin Peak   Date Value Ref Range Status   04/23/2023 39.60 20.00 - 40.00 mcg/mL Final     Vancomycin Trough   Date Value Ref Range Status   04/24/2023 18.40 5.00 - 20.00 mcg/mL Final       Culture Results:  Microbiology Results (last 10 days)     ** No results found for the last 240 hours. **        No results found for: RESPCX    [Ht:  ; Wt:  ]   There is no height or weight on file to calculate BMI.  Ideal body weight: 77.6 kg (171 lb 1.2 oz)  Adjusted ideal body weight: 114 kg (250 lb 10.3 oz)      Assessment:  • WBCs WNL on 4/24   • Creatinine 1.17 mcg/mL and will monitor. Bump in creatinine yesterday.    • No cultures  • Vancomycin regimen reduced on 4/24 based on level results.   • AUC and trough goals are 400-600 and 10-20 mcg/mL, respectively.     Plan:  1. Continue Vancomycin 1250 mg IV q12hrs, new estimated AUC and trough of 496 and 13.6 mcg/mL, respectively.   2. Vancomycin levels when clinically indicated. Consulted until 5/26.  3. Pharmacy will monitor renal function and adjust dose accordingly.    Thank you for this consult.     Estiven Cannon, [facility]   04/26/23 08:23 CDT

## 2023-04-26 NOTE — DISCHARGE SUMMARY
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        Hospital Course  Patient is a 63 y.o. male presented for scheduled Charcot reconstruction right ankle on April 13.  He was admitted postoperatively for pain control and placement.  He had an uneventful stay and was discharged to the [facility] on 4/21/2023.      Procedures Performed    Procedure(s):  REDUCTION OF LEFT ANKLE DEFORMITY WITH APPLICATION OF EXTERNAL FIXATOR  -------------------       Consults:   Consults     No orders found from 3/15/2023 to 4/14/2023.          Condition on Discharge: Stable    Vital Signs  Heart Rate:  [101-104] 101    Physical Exam:   No exam performed today,    Discharge Disposition  Long Term Care (DC - External)    Discharge Medications     Discharge Medications      New Medications      Instructions Start Date   vancomycin   1,750 mg, Intravenous, Every 12 Hours         Continue These Medications      Instructions Start Date   ascorbic acid 1000 MG tablet  Commonly known as: VITAMIN C   1,000 mg, Oral, Nightly      Cholecalciferol 125 MCG (5000 UT) tablet   5,000 Units, Oral, Nightly      clindamycin 300 MG capsule  Commonly known as: CLEOCIN   300 mg, Oral, 3 Times Daily      dronedarone 400 MG tablet  Commonly known as: MULTAQ   400 mg, Oral, Nightly      fexofenadine 180 MG tablet  Commonly known as: ALLEGRA   1 tablet, Oral, Nightly      fluticasone 50 MCG/ACT nasal spray  Commonly known as: FLONASE   2 sprays, Nasal, As Needed      furosemide 20 MG tablet  Commonly known as: Lasix   20 mg, Oral, Daily      glimepiride 4 MG tablet  Commonly known as: AMARYL   4 mg, Oral, Nightly       losartan 25 MG tablet  Commonly known as: COZAAR   25 mg, Oral, Daily      magnesium oxide 400 MG tablet  Commonly known as: MAG-OX   400 mg, Oral, 2 Times Daily      metFORMIN 1000 MG tablet  Commonly known as: GLUCOPHAGE   1,000 mg, Oral, 2 Times Daily With Meals      metoprolol succinate XL 25 MG 24 hr tablet  Commonly known as: TOPROL-XL   25 mg, Oral, 2 Times Daily, 0.5 tablet       multivitamin tablet tablet   Oral, Nightly      ONE TOUCH ULTRA 2 w/Device kit   No dose, route, or frequency recorded.      ONE TOUCH ULTRA TEST test strip  Generic drug: glucose blood   USE TO TEST BLOOD SUGAR THREE TIMES A DAY      Probiotic 1-250 BILLION-MG capsule   1 capsule, Oral, 2 Times Daily      Zinc 50 MG tablet   1 tablet, Oral, Nightly             Discharge Diet: regular   Diet Instructions    As tolerated           Activity at Discharge: nwb LLE    Follow-up Appointments  No future appointments.      Test Results Pending at Discharge: none       Marco A Thompson DPM  04/26/23  15:38 CDT

## 2023-04-27 ENCOUNTER — OUTSIDE FACILITY SERVICE (OUTPATIENT)
Dept: PULMONOLOGY | Facility: CLINIC | Age: 63
End: 2023-04-27
Payer: MEDICARE

## 2023-04-27 LAB
ANION GAP SERPL CALCULATED.3IONS-SCNC: 8 MMOL/L (ref 5–15)
BASOPHILS # BLD AUTO: 0.03 10*3/MM3 (ref 0–0.2)
BASOPHILS NFR BLD AUTO: 0.4 % (ref 0–1.5)
BUN SERPL-MCNC: 26 MG/DL (ref 8–23)
BUN/CREAT SERPL: 23 (ref 7–25)
CALCIUM SPEC-SCNC: 8.4 MG/DL (ref 8.6–10.5)
CHLORIDE SERPL-SCNC: 98 MMOL/L (ref 98–107)
CO2 SERPL-SCNC: 25 MMOL/L (ref 22–29)
CREAT SERPL-MCNC: 1.13 MG/DL (ref 0.76–1.27)
DEPRECATED RDW RBC AUTO: 48.5 FL (ref 37–54)
EGFRCR SERPLBLD CKD-EPI 2021: 73.9 ML/MIN/1.73
EOSINOPHIL # BLD AUTO: 0.06 10*3/MM3 (ref 0–0.4)
EOSINOPHIL NFR BLD AUTO: 0.8 % (ref 0.3–6.2)
ERYTHROCYTE [DISTWIDTH] IN BLOOD BY AUTOMATED COUNT: 14.6 % (ref 12.3–15.4)
GLUCOSE BLDC GLUCOMTR-MCNC: 165 MG/DL (ref 70–130)
GLUCOSE BLDC GLUCOMTR-MCNC: 198 MG/DL (ref 70–130)
GLUCOSE BLDC GLUCOMTR-MCNC: 224 MG/DL (ref 70–130)
GLUCOSE BLDC GLUCOMTR-MCNC: 231 MG/DL (ref 70–130)
GLUCOSE SERPL-MCNC: 163 MG/DL (ref 65–99)
HCT VFR BLD AUTO: 31.6 % (ref 37.5–51)
HGB BLD-MCNC: 10.1 G/DL (ref 13–17.7)
IMM GRANULOCYTES # BLD AUTO: 0.09 10*3/MM3 (ref 0–0.05)
IMM GRANULOCYTES NFR BLD AUTO: 1.3 % (ref 0–0.5)
LYMPHOCYTES # BLD AUTO: 1.23 10*3/MM3 (ref 0.7–3.1)
LYMPHOCYTES NFR BLD AUTO: 17.1 % (ref 19.6–45.3)
MCH RBC QN AUTO: 29.8 PG (ref 26.6–33)
MCHC RBC AUTO-ENTMCNC: 32 G/DL (ref 31.5–35.7)
MCV RBC AUTO: 93.2 FL (ref 79–97)
MONOCYTES # BLD AUTO: 0.7 10*3/MM3 (ref 0.1–0.9)
MONOCYTES NFR BLD AUTO: 9.7 % (ref 5–12)
NEUTROPHILS NFR BLD AUTO: 5.07 10*3/MM3 (ref 1.7–7)
NEUTROPHILS NFR BLD AUTO: 70.7 % (ref 42.7–76)
NRBC BLD AUTO-RTO: 0.7 /100 WBC (ref 0–0.2)
PLATELET # BLD AUTO: 77 10*3/MM3 (ref 140–450)
PMV BLD AUTO: 11.3 FL (ref 6–12)
POTASSIUM SERPL-SCNC: 4.5 MMOL/L (ref 3.5–5.2)
RBC # BLD AUTO: 3.39 10*6/MM3 (ref 4.14–5.8)
SODIUM SERPL-SCNC: 131 MMOL/L (ref 136–145)
WBC NRBC COR # BLD: 7.18 10*3/MM3 (ref 3.4–10.8)

## 2023-04-27 PROCEDURE — 25010000002 ALBUMIN HUMAN 5% PER 50 ML: Performed by: NURSE PRACTITIONER

## 2023-04-27 PROCEDURE — P9041 ALBUMIN (HUMAN),5%, 50ML: HCPCS | Performed by: NURSE PRACTITIONER

## 2023-04-27 PROCEDURE — 85025 COMPLETE CBC W/AUTO DIFF WBC: CPT | Performed by: INTERNAL MEDICINE

## 2023-04-27 PROCEDURE — 25010000002 VANCOMYCIN 10 G RECONSTITUTED SOLUTION

## 2023-04-27 PROCEDURE — 25010000002 ENOXAPARIN PER 10 MG: Performed by: INTERNAL MEDICINE

## 2023-04-27 PROCEDURE — 80048 BASIC METABOLIC PNL TOTAL CA: CPT | Performed by: NURSE PRACTITIONER

## 2023-04-27 PROCEDURE — 82948 REAGENT STRIP/BLOOD GLUCOSE: CPT

## 2023-04-27 PROCEDURE — 82962 GLUCOSE BLOOD TEST: CPT

## 2023-04-27 RX ORDER — TAMSULOSIN HYDROCHLORIDE 0.4 MG/1
0.4 CAPSULE ORAL 2 TIMES DAILY
Status: DISCONTINUED | OUTPATIENT
Start: 2023-04-27 | End: 2023-05-15 | Stop reason: HOSPADM

## 2023-04-27 NOTE — ACP (ADVANCE CARE PLANNING)
[facility]  [address] [place] [address]. [address]  T - [phone]  F - [phone]     Progress Note          PATIENT  DEMOGRAPHICS   PATIENT NAME: Emre Lisa                      PHYSICIAN: Dylan PerezWALTER  : 1960  MRN: [id number]   LOS: 0 days    Patient Care Team:  Jamaal Bravo MD as PCP - General  Marco A Thompson DPM as Consulting Physician (Podiatry)  Subjective   SUBJECTIVE   No acute events overnight         Objective   OBJECTIVE   Vital Signs  Heart Rate:  [] 102     T99.5  HR 97  RR 20  /69  O2 sat 100%    Flowsheet Rows    Flowsheet Row First Filed Value   Admission Height --   Admission Weight 170 kg (375 lb 11.2 oz) Documented at 2023 0900           No intake/output data recorded.    PHYSICAL EXAM    Physical Exam  Constitutional:       Appearance: He is well-developed.   HENT:      Head: Normocephalic and atraumatic.   Eyes:      Conjunctiva/sclera: Conjunctivae normal.      Pupils: Pupils are equal, round, and reactive to light.   Cardiovascular:      Rate and Rhythm: Normal rate and regular rhythm.   Pulmonary:      Effort: Pulmonary effort is normal.      Breath sounds: Normal breath sounds.   Abdominal:      Palpations: Abdomen is soft.   Musculoskeletal:      Cervical back: Neck supple.      Right lower leg: No edema.      Left lower leg: No edema.   Skin:     General: Skin is warm and dry.   Neurological:      Mental Status: He is alert and oriented to person, place, and time.   Psychiatric:         Mood and Affect: Mood normal.         Behavior: Behavior normal.         RESULTS   Results Review:    Results from last 7 days   Lab Units 23  0650 23  0557 23  0739 23  0205 23  0644   SODIUM mmol/L 131* 130* 133* 134* 136   POTASSIUM mmol/L 4.5 4.6 4.5 4.3 4.6   CHLORIDE mmol/L 98 98 99 99 102   CO2 mmol/L 25.0 20.0* 15.0* 23.0 23.0   BUN mg/dL 26* 33* 34* 24* 18   CREATININE mg/dL 1.13 1.17 1.39* 1.17  1.11   CALCIUM mg/dL 8.4* 8.0* 8.5* 9.0 8.7   BILIRUBIN mg/dL  --   --  0.7 0.4 0.5   ALK PHOS U/L  --   --  88 85 76   ALT (SGPT) U/L  --   --  101* 62* 66*   AST (SGOT) U/L  --   --  69* 31 42*   GLUCOSE mg/dL 163* 184* 305* 160* 193*       Estimated Creatinine Clearance: 111.7 mL/min (by C-G formula based on SCr of 1.13 mg/dL).    Results from last 7 days   Lab Units 04/24/23  0205 04/23/23  0644   MAGNESIUM mg/dL 2.0 1.9             Results from last 7 days   Lab Units 04/27/23  0650 04/26/23  1130 04/24/23  0205 04/23/23  0644 04/21/23  0530   WBC 10*3/mm3 7.18 7.28 6.20 6.33 5.25   HEMOGLOBIN g/dL 10.1* 10.1* 10.1* 10.2* 9.9*   PLATELETS 10*3/mm3 77* 92* 143 155 156       Results from last 7 days   Lab Units 04/26/23  1130   INR  1.81*         Imaging Results (Last 24 Hours)     Procedure Component Value Units Date/Time    US Renal Bilateral [[id number]] Collected: 04/26/23 0820     Updated: 04/26/23 1208    Narrative:      HISTORY:  Urinary retention.    COMPARISON:  None    TECHNIQUE:  Real-time ultrasound imaging and color Doppler used to evaluate the kidneys and  bladder.    FINDINGS:  The right kidney measures 13.1 x 5.9 x 4.5 cm. The left kidney measures 13 x 5.8  x 6.5 cm.  Cortical thickness and echotexture are within normal limits. There is  no hydronephrosis or evidence of mass. No calculus was seen.  Lower pole cyst on  the right measuring 4.9 x 4.1 x 4.6 cm.  This appears to be an anechoic  partially exophytic cyst.    The bladder was collapsed around a Colbert catheter.      Impression:      A 5 cm lower pole right renal cyst appears simple.  No acute sonographic  abnormality.      CT Angiogram Chest [[id number]] Collected: 04/26/23 0831     Updated: 04/26/23 1137    Narrative:      EXAM:  CTA chest with contrast, PE protocol.    COMPARISON:  CTA chest PE protocol, 04/24/2023.    HISTORY:  Pulmonary embolism suspected, unknown D-dimer.  Shortness of breath.    TECHNIQUE:  Intravenous contrast was  administered and axial images from the thoracic inlet  through the diaphragm were performed followed by 2D multiplanar reformats.  MIPS  were reconstructed and reviewed.    FINDINGS:  Limited by respiratory motion artifact.    Mild bilateral dependent atelectasis.  Lungs are otherwise clear.  No  consolidation.    Trace bilateral pleural effusions.  Esophagus is collapsed and poorly evaluated.  Mild bilateral gynecomastia.  There is some mild lymphadenopathy suspected in  the upper mediastinum, nonspecific.  No lymphadenopathy identified elsewhere in  the chest by size criteria.    Extensive bilateral pulmonary emboli with saddle embolus extending across the  bifurcation of the main pulmonary artery.  There is extensive embolic disease  throughout all lobes of both lungs. The Main  pulmonary artery is enlarged  measuring up to 4.6 cm in diameter.  There is abnormal flattening or even  leftward convexity of the intraventricular septum.    Limited evaluation of the upper abdomen due to motion.  No gross acute osseous  abnormality.      Impression:      1.  Extensive bilateral pulmonary emboli with saddle embolus extending across  the main pulmonary artery bifurcation.  There is enlargement of the main  pulmonary artery as well as leftward bowing or convexity of the intraventricular  septum.  These findings may be seen in the setting of right heart strain.    2.  The exam is otherwise limited by motion.  There is mild dependent  atelectasis.  Lungs are otherwise grossly clear.    3.  Mild lymphadenopathy in the upper mediastinum, nonspecific.  Consider  reevaluation on short-term follow-up CT chest.               MEDICATIONS    albumin human, 250 mL, Intravenous, Q12H  ascorbic acid, 1,000 mg, Oral, Nightly  cetirizine, 10 mg, Oral, Daily  vitamin D3, 5,000 Units, Oral, Nightly  dilTIAZem, 5 mg, Intravenous, Once  docusate sodium, 100 mg, Oral, BID  dronedarone, 400 mg, Oral, Daily With Dinner  enoxaparin, 1 mg/kg,  Subcutaneous, Q12H  glipizide, 10 mg, Oral, QAM AC  Insulin Aspart, 2-12 Units, Subcutaneous, 4x Daily With Meals & Nightly  iopamidol, 100 mL, Intravenous, Once in imaging  magnesium oxide, 400 mg, Oral, BID  metoprolol succinate XL, 25 mg, Oral, BID  midodrine, 5 mg, Oral, Once  senna-docusate sodium, 2 tablet, Oral, BID  sodium chloride, 3 mL, Intravenous, Q12H  tamsulosin, 0.4 mg, Oral, Nightly  vancomycin, 1,250 mg, Intravenous, Q12H      Pharmacy to Dose enoxaparin (LOVENOX),   Pharmacy to dose vancomycin,         Assessment & Plan   ASSESSMENT / PLAN      Dyspnea    Syncope    Acute respiratory failure with hypoxia    Tachycardia    1.  BITA- baseline creatinine around 0.9-1.0, up to 1.39 and now 1.1.  He reports he had some urinary retention while admitted to [facility] and now complains of difficulty with urination and a similar feeling.  Del Toro was placed. In addition he is receiving vancomycin and Marino is negative.     -US unremarkable, bladder scan was >200 ml and Del Toro was placed, urine sodium low    -Off IVF, added some albumin x 2 doses. Added flomax, D/C del toro today and observe for retention    -If renal function stable tomorrow and no problems with urination we will sign off.     2.  Metabolic acidosis- better     3.  Recent surgery for Charcot left foot     4.  DM2     5.  A-fib     6.  Hypertension     7.  History of rheumatoid arthritis    8.  Extensive pulmonary emboli      Copied text in this note has been reviewed and is accurate as of 4/27/2023           This document has been electronically signed by WALTER Schulz on April 27, 2023 11:13 CDT

## 2023-04-27 NOTE — PROGRESS NOTES
Pharmacokinetics by Pharmacy - Vancomycin    Emre Lisa is a 63 y.o. male receiving vancomycin 1250 mg IV q12hr (day 14) for bone and/or joint infection.  Patient is also receiving ampicillin/sulbactam.    Objective:    Temp Readings from Last 1 Encounters:   04/21/23 98.6 °F (37 °C)     WBC   Date Value Ref Range Status   04/27/2023 7.18 3.40 - 10.80 10*3/mm3 Final   04/26/2023 7.28 3.40 - 10.80 10*3/mm3 Final    No results found for: CRP, LACTATE   Creatinine   Date Value Ref Range Status   04/27/2023 1.13 0.76 - 1.27 mg/dL Final   04/26/2023 1.17 0.76 - 1.27 mg/dL Final   04/25/2023 1.39 (H) 0.76 - 1.27 mg/dL Final       No results found for: VANCOPEAK, VANCOTROUGH, VANCORANDOM    Culture Results:  Microbiology Results (last 10 days)     ** No results found for the last 240 hours. **        No results found for: RESPCX    [Ht:  ; Wt: (!) 170 kg (375 lb 11.2 oz)]   Body mass index is 50.95 kg/m².  Ideal body weight: 77.6 kg (171 lb 1.2 oz)  Adjusted ideal body weight: 115 kg (252 lb 14.8 oz)      Assessment:  • WBCs WNL on 4/24   • Creatinine 1.13 mcg/mL and appears stable. Nephrology consulted after patient noted urine retention. Added del toro to be removed today.   • No cultures  • Vancomycin regimen reduced on 4/24 based on level results.   • AUC and trough goals are 400-600 and 10-20 mcg/mL, respectively.     Plan:  1. Continue Vancomycin 1250 mg IV q12hrs, new estimated AUC and trough of 496 and 13.6 mcg/mL, respectively.   2. Vancomycin levels when clinically indicated. Consulted until 5/26.  3. Pharmacy will monitor renal function and adjust dose accordingly.    Thank you for this consult.     Estiven Cannon, [facility]   04/27/23 08:25 CDT

## 2023-04-28 ENCOUNTER — APPOINTMENT (OUTPATIENT)
Dept: ULTRASOUND IMAGING | Facility: HOSPITAL | Age: 63
End: 2023-04-28
Payer: COMMERCIAL

## 2023-04-28 ENCOUNTER — OUTSIDE FACILITY SERVICE (OUTPATIENT)
Dept: PULMONOLOGY | Facility: CLINIC | Age: 63
End: 2023-04-28
Payer: MEDICARE

## 2023-04-28 LAB
ALBUMIN SERPL-MCNC: 3.9 G/DL (ref 3.5–5.2)
ALBUMIN/GLOB SERPL: 1.4 G/DL
ALP SERPL-CCNC: 201 U/L (ref 39–117)
ALT SERPL W P-5'-P-CCNC: 652 U/L (ref 1–41)
ANION GAP SERPL CALCULATED.3IONS-SCNC: 12 MMOL/L (ref 5–15)
ANISOCYTOSIS BLD QL: NORMAL
AST SERPL-CCNC: 344 U/L (ref 1–40)
BASOPHILS # BLD AUTO: 0.04 10*3/MM3 (ref 0–0.2)
BASOPHILS NFR BLD AUTO: 0.5 % (ref 0–1.5)
BILIRUB SERPL-MCNC: 1.1 MG/DL (ref 0–1.2)
BUN SERPL-MCNC: 30 MG/DL (ref 8–23)
BUN/CREAT SERPL: 23.4 (ref 7–25)
CALCIUM SPEC-SCNC: 8.1 MG/DL (ref 8.6–10.5)
CHLORIDE SERPL-SCNC: 99 MMOL/L (ref 98–107)
CO2 SERPL-SCNC: 21 MMOL/L (ref 22–29)
CREAT SERPL-MCNC: 1.28 MG/DL (ref 0.76–1.27)
DEPRECATED RDW RBC AUTO: 48.1 FL (ref 37–54)
EGFRCR SERPLBLD CKD-EPI 2021: 63.7 ML/MIN/1.73
EOSINOPHIL # BLD AUTO: 0.06 10*3/MM3 (ref 0–0.4)
EOSINOPHIL NFR BLD AUTO: 0.8 % (ref 0.3–6.2)
ERYTHROCYTE [DISTWIDTH] IN BLOOD BY AUTOMATED COUNT: 14.8 % (ref 12.3–15.4)
GLOBULIN UR ELPH-MCNC: 2.8 GM/DL
GLUCOSE BLDC GLUCOMTR-MCNC: 159 MG/DL (ref 70–130)
GLUCOSE BLDC GLUCOMTR-MCNC: 193 MG/DL (ref 70–130)
GLUCOSE BLDC GLUCOMTR-MCNC: 206 MG/DL (ref 70–130)
GLUCOSE BLDC GLUCOMTR-MCNC: 221 MG/DL (ref 70–130)
GLUCOSE SERPL-MCNC: 171 MG/DL (ref 65–99)
HCT VFR BLD AUTO: 28.9 % (ref 37.5–51)
HGB BLD-MCNC: 9.5 G/DL (ref 13–17.7)
HYPOCHROMIA BLD QL: NORMAL
IMM GRANULOCYTES # BLD AUTO: 0.12 10*3/MM3 (ref 0–0.05)
IMM GRANULOCYTES NFR BLD AUTO: 1.6 % (ref 0–0.5)
LYMPHOCYTES # BLD AUTO: 1.46 10*3/MM3 (ref 0.7–3.1)
LYMPHOCYTES NFR BLD AUTO: 19.6 % (ref 19.6–45.3)
MAGNESIUM SERPL-MCNC: 2.4 MG/DL (ref 1.6–2.4)
MCH RBC QN AUTO: 30.3 PG (ref 26.6–33)
MCHC RBC AUTO-ENTMCNC: 32.9 G/DL (ref 31.5–35.7)
MCV RBC AUTO: 92 FL (ref 79–97)
MONOCYTES # BLD AUTO: 0.65 10*3/MM3 (ref 0.1–0.9)
MONOCYTES NFR BLD AUTO: 8.7 % (ref 5–12)
NEUTROPHILS NFR BLD AUTO: 5.11 10*3/MM3 (ref 1.7–7)
NEUTROPHILS NFR BLD AUTO: 68.8 % (ref 42.7–76)
NRBC BLD AUTO-RTO: 1.1 /100 WBC (ref 0–0.2)
PLATELET # BLD AUTO: 48 10*3/MM3 (ref 140–450)
PMV BLD AUTO: 10.9 FL (ref 6–12)
POLYCHROMASIA BLD QL SMEAR: NORMAL
POTASSIUM SERPL-SCNC: 4.5 MMOL/L (ref 3.5–5.2)
PROT SERPL-MCNC: 6.7 G/DL (ref 6–8.5)
RBC # BLD AUTO: 3.14 10*6/MM3 (ref 4.14–5.8)
SMALL PLATELETS BLD QL SMEAR: NORMAL
SODIUM SERPL-SCNC: 132 MMOL/L (ref 136–145)
VANCOMYCIN SERPL-MCNC: 38.9 MCG/ML (ref 5–40)
VANCOMYCIN TROUGH SERPL-MCNC: 15.2 MCG/ML (ref 5–20)
WBC MORPH BLD: NORMAL
WBC NRBC COR # BLD: 7.44 10*3/MM3 (ref 3.4–10.8)

## 2023-04-28 PROCEDURE — 85007 BL SMEAR W/DIFF WBC COUNT: CPT | Performed by: INTERNAL MEDICINE

## 2023-04-28 PROCEDURE — 85025 COMPLETE CBC W/AUTO DIFF WBC: CPT | Performed by: INTERNAL MEDICINE

## 2023-04-28 PROCEDURE — 86147 CARDIOLIPIN ANTIBODY EA IG: CPT | Performed by: INTERNAL MEDICINE

## 2023-04-28 PROCEDURE — 83520 IMMUNOASSAY QUANT NOS NONAB: CPT | Performed by: INTERNAL MEDICINE

## 2023-04-28 PROCEDURE — 84165 PROTEIN E-PHORESIS SERUM: CPT | Performed by: INTERNAL MEDICINE

## 2023-04-28 PROCEDURE — 82784 ASSAY IGA/IGD/IGG/IGM EACH: CPT | Performed by: INTERNAL MEDICINE

## 2023-04-28 PROCEDURE — 83735 ASSAY OF MAGNESIUM: CPT | Performed by: INTERNAL MEDICINE

## 2023-04-28 PROCEDURE — 86022 PLATELET ANTIBODIES: CPT | Performed by: INTERNAL MEDICINE

## 2023-04-28 PROCEDURE — 80053 COMPREHEN METABOLIC PANEL: CPT | Performed by: INTERNAL MEDICINE

## 2023-04-28 PROCEDURE — 83521 IG LIGHT CHAINS FREE EACH: CPT | Performed by: INTERNAL MEDICINE

## 2023-04-28 PROCEDURE — 80202 ASSAY OF VANCOMYCIN: CPT | Performed by: INTERNAL MEDICINE

## 2023-04-28 PROCEDURE — 81241 F5 GENE: CPT | Performed by: INTERNAL MEDICINE

## 2023-04-28 PROCEDURE — 93970 EXTREMITY STUDY: CPT

## 2023-04-28 PROCEDURE — 82542 COL CHROMOTOGRAPHY QUAL/QUAN: CPT | Performed by: INTERNAL MEDICINE

## 2023-04-28 PROCEDURE — 25010000002 VANCOMYCIN 10 G RECONSTITUTED SOLUTION: Performed by: INTERNAL MEDICINE

## 2023-04-28 PROCEDURE — 82948 REAGENT STRIP/BLOOD GLUCOSE: CPT

## 2023-04-28 PROCEDURE — 99222 1ST HOSP IP/OBS MODERATE 55: CPT | Performed by: INTERNAL MEDICINE

## 2023-04-28 PROCEDURE — 86148 ANTI-PHOSPHOLIPID ANTIBODY: CPT | Performed by: INTERNAL MEDICINE

## 2023-04-28 PROCEDURE — 25010000002 FONDAPARINUX PER 0.5 MG: Performed by: INTERNAL MEDICINE

## 2023-04-28 PROCEDURE — 25010000002 ENOXAPARIN PER 10 MG: Performed by: INTERNAL MEDICINE

## 2023-04-28 PROCEDURE — 86334 IMMUNOFIX E-PHORESIS SERUM: CPT | Performed by: INTERNAL MEDICINE

## 2023-04-28 RX ORDER — OXYCODONE AND ACETAMINOPHEN 7.5; 325 MG/1; MG/1
1 TABLET ORAL EVERY 4 HOURS PRN
Status: DISPENSED | OUTPATIENT
Start: 2023-04-28 | End: 2023-05-12

## 2023-04-28 RX ORDER — SODIUM CHLORIDE 9 MG/ML
100 INJECTION, SOLUTION INTRAVENOUS ONCE
Status: DISCONTINUED | OUTPATIENT
Start: 2023-04-28 | End: 2023-05-15 | Stop reason: HOSPADM

## 2023-04-28 RX ORDER — FONDAPARINUX SODIUM 5 MG/.4ML
10 INJECTION SUBCUTANEOUS
Status: DISCONTINUED | OUTPATIENT
Start: 2023-04-28 | End: 2023-05-06

## 2023-04-28 RX ORDER — FONDAPARINUX SODIUM 10 MG/.8ML
10 INJECTION SUBCUTANEOUS
Status: DISCONTINUED | OUTPATIENT
Start: 2023-04-28 | End: 2023-04-28

## 2023-04-28 RX ORDER — FUROSEMIDE 40 MG/1
40 TABLET ORAL DAILY
Status: DISCONTINUED | OUTPATIENT
Start: 2023-04-28 | End: 2023-04-28

## 2023-04-28 RX ORDER — SODIUM BICARBONATE 650 MG/1
650 TABLET ORAL 3 TIMES DAILY
Status: DISCONTINUED | OUTPATIENT
Start: 2023-04-28 | End: 2023-05-15 | Stop reason: HOSPADM

## 2023-04-28 NOTE — CONSULTS
REASON FOR CONSULTATION:  Thrombocytopenia, acute pulmonary embolism   Provide an opinion on any further workup or treatment                             REQUESTING PHYSICIAN:  Dr Persaud     RECORDS OBTAINED:  Records of the patients history including those obtained from the referring provider were reviewed and summarized in detail.    History of Present Illness     This is a pleasant 63-year-old male who was seen in consultation at the request of Dr. Persaud for evaluation of thrombocytopenia and acute pulmonary embolism.  Patient was admitted to hospital for left ankle deformity and ulcer.  He underwent partial excision of left fibula, left talectomy and application of fixator through left lower extremity.  He was doing well and was receiving IV antibiotic and LTAC.  Postoperatively patient received heparin prophylaxis for DVT.  Patient was noticed to have chest pain and shortness of breath on April 23 and subsequently was noticed to have elevated D-dimer.  Patient had CT angiogram chest performed on April 24, 2023 which was somewhat limited due to artifact however showed concern for PE.  Patient at that point was switched to Lovenox and CT angiogram chest was repeated on April 25 which showed bilateral pulmonary embolism with saddle PE.  Patient was also noticed to have thrombocytopenia with platelet count of 48,000 on April 28, 2023.  I been asked to assist with evaluation and management of his pulmonary embolism and thrombocytopenia.        Past Medical History:   Diagnosis Date   • Arthritis    • Atrial fibrillation    • Diabetes mellitus    • Diabetic foot ulcer associated with type 2 diabetes mellitus     left great toe   • Hallux malleus of left foot    • Hammer toe    • Hypertension    • MI (myocardial infarction)    • Neuropathy in diabetes    • Onychomycosis    • Presence of biventricular cardiac pacemaker    • Rheumatoid arthritis         Past Surgical History:   Procedure Laterality Date   • AMPUTATION  DIGIT Right 3/5/2017    Procedure: RIGHT AMPUTATION TRANSMETATRASAL , RECESSION GASTROCNEMOUS;  Surgeon: Marco A Thompson DPM;  Location: [facility];  Service:    • CARDIAC DEFIBRILLATOR PLACEMENT  2010, 2016   • CARPAL TUNNEL RELEASE  2015    elbow and wrist left arm   • FOOT IRRIGATION, DEBRIDEMENT AND REPAIR Left 3/7/2018    Procedure: FOOT IRRIGATION, DEBRIDEMENT AND REPAIR;  Surgeon: Marco A Thompson DPM;  Location: [facility] OR;  Service:    • FOOT IRRIGATION, DEBRIDEMENT AND REPAIR Left 3/10/2018    Procedure: FOOT IRRIGATION, DEBRIDEMENT AND REPAIR;  Surgeon: Marco A Thompson DPM;  Location: [facility];  Service: Podiatry   • FOOT WOUND CLOSURE Right 3/2/2017    Procedure: INCISION AND DRAINAGE, RIGHT FOOT;  Surgeon: Tung Rosenthal DPM;  Location: [facility];  Service:    • HAMMER TOE REPAIR Left 2/15/2018    Procedure: HAMMERTOE CORRECTION LEFT SECOND, THIRD AND FOURTH TOES AND HALLUX INTERPHALANGEAL JOINT ARTHRODESIS LEFT FOOT (Micro Asnis, 4.0 & 5.0 Asnis)      (c-arm);  Surgeon: Marco A Thompson DPM;  Location: [facility];  Service:    • HARDWARE REMOVAL Left 3/5/2018    Procedure: ANKLE/FOOT HARDWARE REMOVAL;  Surgeon: Marco A Thompson DPM;  Location: [facility] OR;  Service:    • INCISION AND DRAINAGE LEG Left 3/5/2018    Procedure: INCISION AND DRAINAGE LOWER EXTREMITY;  Surgeon: Marco A Thompson DPM;  Location: [facility];  Service:    • PLANTAR FASCIA RELEASE Left 11/21/2019    Procedure: PLANTAR PLANING LEFT FOOT AND ALL OTHER INDICATED PROCEDURES;  Surgeon: Marco A Thompson DPM;  Location: [facility];  Service: Podiatry   • TOE AMPUTATION Right 2016   • TONSILECTOMY, ADENOIDECTOMY, BILATERAL MYRINGOTOMY AND TUBES      age 25        No current facility-administered medications on file prior to encounter.     Current Outpatient Medications on File Prior to Encounter   Medication Sig Dispense Refill   • ascorbic acid (VITAMIN C) 1000 MG tablet Take 1 tablet by mouth Every Night.     • Bacillus Coagulans-Inulin  (Probiotic) 1-250 BILLION-MG capsule Take 1 capsule by mouth 2 (Two) Times a Day. 30 capsule 1   • Blood Glucose Monitoring Suppl (ONE TOUCH ULTRA 2) w/Device kit      • Cholecalciferol 125 MCG (5000 UT) tablet Take 1 tablet by mouth Every Night.     • clindamycin (CLEOCIN) 300 MG capsule Take 1 capsule by mouth 3 (Three) Times a Day. 30 capsule 0   • dronedarone (MULTAQ) 400 MG tablet Take 1 tablet by mouth Every Night.     • fexofenadine (ALLEGRA) 180 MG tablet Take 1 tablet by mouth Every Night.     • fluticasone (FLONASE) 50 MCG/ACT nasal spray 2 sprays into the nostril(s) as directed by provider As Needed for Rhinitis.     • furosemide (Lasix) 20 MG tablet Take 1 tablet by mouth Daily. 10 tablet 0   • glimepiride (AMARYL) 4 MG tablet Take 1 tablet by mouth Every Night.     • losartan (COZAAR) 25 MG tablet Take 1 tablet by mouth Daily.     • magnesium oxide (MAG-OX) 400 MG tablet Take 1 tablet by mouth 2 (Two) Times a Day.     • metFORMIN (GLUCOPHAGE) 1000 MG tablet Take 1 tablet by mouth 2 (Two) Times a Day With Meals.     • metoprolol succinate XL (TOPROL-XL) 25 MG 24 hr tablet Take 1 tablet by mouth 2 (Two) Times a Day. 0.5 tablet     • multivitamin (THERAGRAN) tablet tablet Take  by mouth Every Night.     • ONE TOUCH ULTRA TEST test strip USE TO TEST BLOOD SUGAR THREE TIMES A DAY  1   • vancomycin 1750 mg/500 mL 0.9% NS IVPB ([facility]) Infuse 500 mL into a venous catheter Every 12 (Twelve) Hours for 70 doses. Indications: Bone and/or Joint Infection 85545 mL 1   • Zinc 50 MG tablet Take 1 tablet by mouth Every Night.          ALLERGIES:    Allergies   Allergen Reactions   • Januvia [Sitagliptin] Hives   • Lipitor [Atorvastatin] Other (See Comments)     Muscle Cramps...   • Shellfish Allergy Other (See Comments)   • Sulfa Antibiotics Rash        Social History     Socioeconomic History   • Marital status:    Tobacco Use   • Smoking status: Never   • Smokeless tobacco: Never   Vaping Use   • Vaping Use:  Never used   Substance and Sexual Activity   • Alcohol use: Yes     Comment: social   • Drug use: No   • Sexual activity: Defer        Family History   Problem Relation Age of Onset   • Diabetes Father    • Stroke Father    • No Known Problems Maternal Grandmother    • No Known Problems Maternal Grandfather    • No Known Problems Paternal Grandmother    • No Known Problems Paternal Grandfather    • No Known Problems Daughter    • No Known Problems Daughter    • No Known Problems Son    • Heart disease Other    • Hypertension Other         Review of Systems   Constitutional: Positive for activity change, appetite change and fatigue. Negative for fever.   HENT: Negative for congestion, hearing loss, sore throat and voice change.    Eyes: Negative for photophobia, pain and visual disturbance.   Respiratory: Positive for cough, chest tightness and shortness of breath. Negative for wheezing.    Cardiovascular: Positive for leg swelling. Negative for chest pain and palpitations.   Gastrointestinal: Negative for abdominal distention, abdominal pain, constipation, diarrhea, nausea and vomiting.   Endocrine: Negative for cold intolerance, heat intolerance and polydipsia.   Genitourinary: Negative for difficulty urinating, dysuria, flank pain and hematuria.   Musculoskeletal: Positive for arthralgias, back pain and gait problem. Negative for joint swelling.   Skin: Negative for color change, pallor and rash.   Allergic/Immunologic: Negative for environmental allergies and food allergies.   Neurological: Positive for weakness and numbness. Negative for dizziness and headaches.   Hematological: Negative for adenopathy. Bruises/bleeds easily.   Psychiatric/Behavioral: Negative for agitation, confusion and dysphoric mood. The patient is not nervous/anxious.             Objective     Vitals:    04/27/23 0735 04/27/23 1614 04/28/23 0203 04/28/23 0834   Pulse: 102 102 95 100   Weight:              View : No data to display.                 Physical Exam  Vitals and nursing note reviewed.   Constitutional:       General: He is not in acute distress.     Appearance: He is obese. He is ill-appearing.   Eyes:      General: No scleral icterus.     Extraocular Movements: Extraocular movements intact.      Pupils: Pupils are equal, round, and reactive to light.   Cardiovascular:      Rate and Rhythm: Regular rhythm. Tachycardia present.      Heart sounds: No murmur heard.  Pulmonary:      Effort: Pulmonary effort is normal. No respiratory distress.      Breath sounds: Normal breath sounds.   Abdominal:      General: There is no distension.      Palpations: Abdomen is soft. There is no mass.      Tenderness: There is no abdominal tenderness.   Musculoskeletal:         General: Deformity present.      Right lower leg: Edema present.      Left lower leg: Edema present.   Lymphadenopathy:      Cervical: No cervical adenopathy.   Skin:     General: Skin is dry.      Coloration: Skin is pale.      Findings: Bruising present.   Neurological:      General: No focal deficit present.      Mental Status: He is alert and oriented to person, place, and time. Mental status is at baseline.      Cranial Nerves: No cranial nerve deficit.      Coordination: Coordination normal.   Psychiatric:         Mood and Affect: Mood normal.         Behavior: Behavior normal.         Thought Content: Thought content normal.               RECENT LABS:Independently reviewed and summarized  Hematology WBC   Date Value Ref Range Status   04/28/2023 7.44 3.40 - 10.80 10*3/mm3 Final     RBC   Date Value Ref Range Status   04/28/2023 3.14 (L) 4.14 - 5.80 10*6/mm3 Final     Hemoglobin   Date Value Ref Range Status   04/28/2023 9.5 (L) 13.0 - 17.7 g/dL Final     Hematocrit   Date Value Ref Range Status   04/28/2023 28.9 (L) 37.5 - 51.0 % Final     Platelets   Date Value Ref Range Status   04/28/2023 48 (C) 140 - 450 10*3/mm3 Final          LAB RESULTS:      Lab 04/28/23  0545 04/27/23  0650  04/26/23  1130 04/24/23  0205 04/23/23  0644   WBC 7.44 7.18 7.28 6.20 6.33   HEMOGLOBIN 9.5* 10.1* 10.1* 10.1* 10.2*   HEMATOCRIT 28.9* 31.6* 29.9* 31.0* 30.8*   PLATELETS 48* 77* 92* 143 155   NEUTROS ABS 5.11 5.07 5.12 3.47 4.24   IMMATURE GRANS (ABS) 0.12* 0.09* 0.08* 0.07* 0.11*   LYMPHS ABS 1.46 1.23 1.32 1.74 1.00   MONOS ABS 0.65 0.70 0.67 0.77 0.85   EOS ABS 0.06 0.06 0.04 0.09 0.08   MCV 92.0 93.2 90.1 92.5 91.9   PROTIME  --   --  21.0*  --   --    APTT  --   --  30.3  --   --          Lab 04/28/23  0545 04/27/23  0650 04/26/23  0557 04/25/23  0739 04/24/23  0205 04/23/23  0644   SODIUM 132* 131* 130* 133* 134* 136   POTASSIUM 4.5 4.5 4.6 4.5 4.3 4.6   CHLORIDE 99 98 98 99 99 102   CO2 21.0* 25.0 20.0* 15.0* 23.0 23.0   ANION GAP 12.0 8.0 12.0 19.0* 12.0 11.0   BUN 30* 26* 33* 34* 24* 18   CREATININE 1.28* 1.13 1.17 1.39* 1.17 1.11   EGFR 63.7 73.9 70.9 57.7* 70.9 75.5   GLUCOSE 171* 163* 184* 305* 160* 193*   CALCIUM 8.1* 8.4* 8.0* 8.5* 9.0 8.7   MAGNESIUM 2.4  --   --   --  2.0 1.9         Lab 04/28/23  0545 04/25/23  0739 04/24/23  0205 04/23/23  0644   TOTAL PROTEIN 6.7 6.9 7.1 6.4   ALBUMIN 3.9 3.9 4.1 3.7   GLOBULIN 2.8 3.0 3.0 2.7   ALT (SGPT) 652* 101* 62* 66*   AST (SGOT) 344* 69* 31 42*   BILIRUBIN 1.1 0.7 0.4 0.5   ALK PHOS 201* 88 85 76         Lab 04/26/23  1130 04/25/23  0945 04/25/23  0739 04/24/23  0205 04/23/23  0856 04/23/23  0644   HSTROP T  --  52* 49* 50* 64* 55*   PROTIME 21.0*  --   --   --   --   --    INR 1.81*  --   --   --   --   --                  Lab 04/25/23  0858   PH, ARTERIAL 7.360   PCO2, ARTERIAL 33.5*   PO2 ART 66.7*   O2 SATURATION ART 91.5*   FIO2 60   HCO3 ART 18.9*   BASE EXCESS ART -5.8*     Brief Urine Lab Results  (Last result in the past 365 days)      Color   Clarity   Blood   Leuk Est   Nitrite   Protein   CREAT   Urine HCG        04/25/23 1751             222.9         04/25/23 1751 Yellow   Cloudy   Trace   Negative   Negative   100 mg/dL (2+)                Microbiology Results (last 10 days)     ** No results found for the last 240 hours. **            Imaging (independently reviewed and summarized):       CT Angiogram Chest    Result Date: 4/26/2023  1.  Extensive bilateral pulmonary emboli with saddle embolus extending across the main pulmonary artery bifurcation.  There is enlargement of the main pulmonary artery as well as leftward bowing or convexity of the intraventricular septum.  These findings may be seen in the setting of right heart strain. 2.  The exam is otherwise limited by motion.  There is mild dependent atelectasis.  Lungs are otherwise grossly clear. 3.  Mild lymphadenopathy in the upper mediastinum, nonspecific.  Consider reevaluation on short-term follow-up CT chest.     CT Angiogram Chest    Result Date: 4/24/2023  I suspect there are bilateral pulmonary emboli, but it is difficult to tell with certainty due to suboptimal contrast timing and significant motion artifact. Repeat examination is recommended.    XR Chest 1 View    Result Date: 4/25/2023  Mild cardiomegaly.     XR Chest 1 View    Result Date: 4/23/2023  1.  Mild cardiomegaly with mild pulmonary vascular congestion. 2.  No acute infiltrate or effusion is seen.    US Renal Bilateral    Result Date: 4/26/2023  A 5 cm lower pole right renal cyst appears simple.  No acute sonographic abnormality.         Assessment & Plan     (1) Acute bilateral pulmonary emboli with saddle embolus     New diagnosis for me.   Patient with no prior history of PE or DVT, no family history of VTE.  He was admitted for left foot surgery and was receiving anticoagulation with heparin 5000 international units subcu throughout his hospital stay.  His admission was on April 13 and heparin was started on April 14 which he continues to receive daily.    He has developed sudden bilateral pulmonary emboli.    Differentials in this case remain broad.  He clearly is immobile however given he has thrombocytopenia with  platelet count of 48,000 and heparin exposure heparin-induced thrombocytopenia which is highly thrombogenic condition remains on the differential.    I will order HIT Jayda and JOVANA.   I will discontinue all heparin products.    I have started him on fondaparinux 10 mg subcu injection daily while awaiting the result of HIT panel.     He is currently hemodynamically stable.    He is not a candidate for thrombolytics given severe thrombocytopenia and recent surgery.  Discussed with patient and family that given thrombocytopenia he is at high risk for bleeding complication however after carefully considering risk versus benefit of ongoing anticoagulation in the setting of bilateral PE and saddle embolus benefits associated with anticoagulation outweighs the potential risk.    I will also order factor V Leiden mutation, prothrombin gene profile, antiphospholipid antibody panel to look at other causes of thrombophilia.    I will also order venous Doppler studies of lower extremity to rule out DVT.    Recommendations:   · Discussed diagnosis, prognosis and treatment options at length.   · Hold all heparin products.   · Start arixtra 10 mg SQ daily.   · Check HIT JAYDA, JOVANA, antiphospholipid antibody, factor V leiden, prothrombin gene mutation.     (2) Thrombocytopenia     Differentials include conservative process versus heparin-induced thrombocytopenia.  He has 4 T score at least is an intermediate category.  Heparin was started on April 14, patient developed pulmonary embolism on April 24 and platelet count drop was noticed first on April 26 and now has declined to 48,000.    Congestive hepatopathy/hypersplenism, right-sided heart failure is also in differential.    He does not have any active bleeding currently.    HIT work up has been ordered as above.     Discussed with patient and family about potential risk of bleeding with anticoagulation especially if this is not heparin-induced thrombocytopenia.  He currently  does not have any active bleeding.  Closely monitor for bleeding complication.    Platelet transfusion only if active bleeding or thrombocytopenia with platelet count of less than 15,000.      (3) Anemia     Patient with normocytic anemia with hemoglobin of 9.5.  Hemoglobin has slowly trended from admission downwards to 9.5.  I will check CBC, ferritin, iron profile.  I will also check SPEP, immunofixation and free light chain to rule out plasma cell disorder.  Recommend continue monitor.  Packed red blood cell transfusion for hemoglobin less than 7.      Discussed at length with patient and family.    Discussed with nursing staff.      Discussed with primary team Dr. Persaud.

## 2023-04-28 NOTE — ACP (ADVANCE CARE PLANNING)
[facility]  [address] [place] [address]. [id number]  T - [phone]  F - [phone]     Progress Note          PATIENT  DEMOGRAPHICS   PATIENT NAME: Emre Lisa                      PHYSICIAN: Dylan PerezWALTER  : 1960  MRN: [id number]   LOS: 0 days    Patient Care Team:  Jamaal Bravo MD as PCP - General  Marco A Thompson DPM as Consulting Physician (Podiatry)  Subjective   SUBJECTIVE   No acute events overnight         Objective   OBJECTIVE   Vital Signs  Heart Rate:  [] 100     T 98.5  HR 93  RR 20  /68  O2 sat 98%    Flowsheet Rows    Flowsheet Row First Filed Value   Admission Height --   Admission Weight 170 kg (375 lb 11.2 oz) Documented at 2023 0900           No intake/output data recorded.    PHYSICAL EXAM    Physical Exam  Constitutional:       Appearance: He is well-developed.   HENT:      Head: Normocephalic and atraumatic.   Eyes:      Conjunctiva/sclera: Conjunctivae normal.      Pupils: Pupils are equal, round, and reactive to light.   Cardiovascular:      Rate and Rhythm: Normal rate and regular rhythm.   Pulmonary:      Effort: Pulmonary effort is normal.      Breath sounds: Normal breath sounds.   Abdominal:      Palpations: Abdomen is soft.   Musculoskeletal:      Cervical back: Neck supple.      Right lower leg: No edema.      Left lower leg: No edema.   Skin:     General: Skin is warm and dry.   Neurological:      Mental Status: He is alert and oriented to person, place, and time.   Psychiatric:         Mood and Affect: Mood normal.         Behavior: Behavior normal.         RESULTS   Results Review:    Results from last 7 days   Lab Units 23  0545 23  0650 23  0557 23  0739 23  0205   SODIUM mmol/L 132* 131* 130* 133* 134*   POTASSIUM mmol/L 4.5 4.5 4.6 4.5 4.3   CHLORIDE mmol/L 99 98 98 99 99   CO2 mmol/L 21.0* 25.0 20.0* 15.0* 23.0   BUN mg/dL 30* 26* 33* 34* 24*   CREATININE mg/dL 1.28* 1.13 1.17 1.39*  1.17   CALCIUM mg/dL 8.1* 8.4* 8.0* 8.5* 9.0   BILIRUBIN mg/dL 1.1  --   --  0.7 0.4   ALK PHOS U/L 201*  --   --  88 85   ALT (SGPT) U/L 652*  --   --  101* 62*   AST (SGOT) U/L 344*  --   --  69* 31   GLUCOSE mg/dL 171* 163* 184* 305* 160*       Estimated Creatinine Clearance: 98.6 mL/min (A) (by C-G formula based on SCr of 1.28 mg/dL (H)).    Results from last 7 days   Lab Units 04/28/23  0545 04/24/23  0205 04/23/23  0644   MAGNESIUM mg/dL 2.4 2.0 1.9             Results from last 7 days   Lab Units 04/28/23  0545 04/27/23  0650 04/26/23  1130 04/24/23  0205 04/23/23  0644   WBC 10*3/mm3 7.44 7.18 7.28 6.20 6.33   HEMOGLOBIN g/dL 9.5* 10.1* 10.1* 10.1* 10.2*   PLATELETS 10*3/mm3 48* 77* 92* 143 155       Results from last 7 days   Lab Units 04/26/23  1130   INR  1.81*         Imaging Results (Last 24 Hours)     ** No results found for the last 24 hours. **           MEDICATIONS    !Vancomycin Level Draw Needed, , Does not apply, Once  ascorbic acid, 1,000 mg, Oral, Nightly  cetirizine, 10 mg, Oral, Daily  vitamin D3, 5,000 Units, Oral, Nightly  docusate sodium, 100 mg, Oral, BID  dronedarone, 400 mg, Oral, Daily With Dinner  fondaparinux, 10 mg, Subcutaneous, Q24H  furosemide, 40 mg, Oral, Daily  glipizide, 10 mg, Oral, QAM AC  Insulin Aspart, 2-12 Units, Subcutaneous, 4x Daily With Meals & Nightly  magnesium oxide, 400 mg, Oral, BID  metoprolol succinate XL, 25 mg, Oral, BID  senna-docusate sodium, 2 tablet, Oral, BID  sodium bicarbonate, 650 mg, Oral, TID  sodium chloride, 3 mL, Intravenous, Q12H  tamsulosin, 0.4 mg, Oral, BID  vancomycin, 1,250 mg, Intravenous, Q12H      Pharmacy to dose vancomycin,         Assessment & Plan   ASSESSMENT / PLAN      Dyspnea    Syncope    Acute respiratory failure with hypoxia    Tachycardia    1.  BITA- baseline creatinine around 0.9-1.0, up to 1.39 and now 1.1.  He reports he had some urinary retention while admitted to [facility] and now complains of difficulty with urination  and a similar feeling.  Del Toro was placed. In addition he is receiving vancomycin and Marino is negative.     -US unremarkable, bladder scan was >200 ml and Del Toro was placed, urine sodium low    -Off IVF, added some albumin x 2 doses. Added flomax and now BID, D/C del toro 4/27. Add PO bicarb.    -We will tentatively sign off pending stable labs in the morning.     2.  Metabolic acidosis- better, add PO bicarb     3.  Recent surgery for Charcot left foot     4.  DM2     5.  A-fib     6.  Hypertension     7.  History of rheumatoid arthritis    8.  Extensive pulmonary emboli    9.  Transaminitis- workup per primary team      Copied text in this note has been reviewed and is accurate as of 4/28/2023           This document has been electronically signed by WALTER Schulz on April 28, 2023 12:45 CDT

## 2023-04-28 NOTE — PROGRESS NOTES
Pharmacokinetics by Pharmacy - Vancomycin    Emre Lisa is a 63 y.o. male receiving vancomycin 1250 mg IV q12hr (day 15) for bone and/or joint infection.  Patient is also receiving ampicillin/sulbactam.    Objective:    Temp Readings from Last 1 Encounters:   04/21/23 98.6 °F (37 °C)     WBC   Date Value Ref Range Status   04/28/2023 7.44 3.40 - 10.80 10*3/mm3 Final   04/27/2023 7.18 3.40 - 10.80 10*3/mm3 Final   04/26/2023 7.28 3.40 - 10.80 10*3/mm3 Final    No results found for: CRP, LACTATE   Creatinine   Date Value Ref Range Status   04/28/2023 1.28 (H) 0.76 - 1.27 mg/dL Final   04/27/2023 1.13 0.76 - 1.27 mg/dL Final   04/26/2023 1.17 0.76 - 1.27 mg/dL Final       No results found for: VANCOPEAK, VANCOTROUGH, VANCORANDOM    Culture Results:  Microbiology Results (last 10 days)     ** No results found for the last 240 hours. **        No results found for: RESPCX    [Ht:  ; Wt: (!) 170 kg (375 lb 11.2 oz)]   Body mass index is 50.95 kg/m².  Ideal body weight: 77.6 kg (171 lb 1.2 oz)  Adjusted ideal body weight: 115 kg (252 lb 14.8 oz)      Assessment:  • WBCs WNL  • Creatinine 1.28 mcg/mL and will monitor.  • Vancomycin regimen reduced on 4/24 based on level results. Vancomycin peak (a random level was added on to morning labs) resulted this morning at 38.9 mcg/mL @ 0545 (~15 minutes after infusion). Vancomycin trough resulted at 15.2 mcg/mL @ 1442. This gives a calculated AUC and trough of 600 and 12.5 mcg/mL, respectively. AUC is at the maximum of goal range but given the peak was obtained so early, the true peak is likely lower which would reduce the true AUC value.   • AUC and trough goals are 400-600 and 10-20 mcg/mL, respectively.     Plan:  1. Continue Vancomycin 1250 mg IV q12hrs  2. Vancomycin levels when clinically indicated. Consulted until 5/26.  3. Pharmacy will monitor renal function and adjust dose accordingly.    Thank you for this consult.     Estiven Cannon [facility]   04/28/23 09:43  CDT

## 2023-04-28 NOTE — ACP (ADVANCE CARE PLANNING)
NUTRITION ASSESSMENT    Reason for assessment:  Follow up    Nutrition Diet/History:    -Typical Intake:  Improved intakes since last assessed    Weight history:  CBW:  375.7#  Admit Wt:  375#  No new wt in the last two days.    Labs:  Na 132, Glu 171, BUN 30, Cr 1.28, Platelets 48,     Meds:  Vit C, D3, colace, glipizide, ssi, mag-ox, pericolace, vancomycin    Nutrition Prescription Order:  Cardiac, consistent carb; milk TID    Evaluation of Nutrition Intake:    4/24:  75,75,50  4/25:  0  4/26:  25, 75, 75  4/27:  100, 25    Unsure pt's last BM - not one since at least 4/24.  Pt's intake has improved since last assessment.  Will continue with current nutrition POC.

## 2023-04-28 NOTE — ACP (ADVANCE CARE PLANNING)
[facility] @ [facility]  INPATIENT PROGRESS NOTE    PATIENT NAME: Emre Lisa      PHYSICIAN: Charlie Persaud MD  : 1960        MRN: [id number]  Patient Care Team:  Jamaal Bravo MD as PCP - Marco A Chauhan DPM as Consulting Physician (Podiatry)    Chief Complaint:  Continued antibiotic therapy and medical management  History of Present Illness: Patient is a 64 y/o male with a pmh of type 2 DM, hypertension, CAD, and neuropathy.  He recently underwent surgical repair of Charcot deformity with external fixator stabilization to his left foot related to cellulitis.  He tolerated the surgical procedure well.  He will need IV antibiotic therapy through 23 due to bone/joint infection.  He remains non-weight bearing of left lower extremity.  He tells me the pain to his left foot remains controlled, however, he does have a torn left rotator cuff and arthritis so his hip and shoulder are the source of his pain.  He tells me he is doing well and he has no other complaints or concerns.  I have reviewed the patients most recent labs and diagnostics independently.  Glucose 124, BUN 15, creatinine 0.89, sodium 138, potassium 4.2, chloride 105, co2 24, wbc 5.25, hgb 9.9, hct 30.1, platelets 156.       Assessment      Cellulitis of left foot  Charcot's joint of left foot  Type 2 DM  Arthritis  A-fib  Hypertension  Rheumatoid arthritis  DVT & GI prophylaxis     DVT Prophylaxis: Heparin  GI Prophylaxis: Protonix  Code Status: Full    Plan     -Patient remains stable at this time  -We will consult hem/onc due to throbocytopenia  -We will stop lovenox and start arixtra  -Liver enzymes elevated.  We will consult GI.  -Remains on AirVo 55L and 60%  -We will hold off on therapy at this time.  -More awake and alert today.  States he feels better today  -IV Vancomycin as before with pharmacy to dose  -Telemetry  -Strict I&O  -We will follow cardiology,  nephrology, and pulmonology recommendations.  -Continues to require aggressive monitoring   -Renal function is improved.   -We will continue to monitor labs closely  -DVT and GI prophylaxis in place.  -We'll continue monitoring patient in hospital setting and treat patient as course dictates.  -Please review orders for detailed plan of care.  -For Acute and Chronic medical condition we will continue medications described below in medication section which have been reviewed and we will continue unless changed in plan of care.  -Laboratory and diagnostic studies have been independently reviewed and the reports are reviewed as documented below.  -Total critical care time spent 37 minutes excluding time spent on separate billable procedures. Time spent does not coincide with other critical care providers. Patient is critically ill requiring continued close observation and management in ICU level setting primarily due to hypotensive/hypoxic respiratory failure. Time was exclusive to this patient and does not include time spent teaching, updating family or performing procedures.    Subjective   Interval History:   Patient Complaints:   Patient seen and examined.   Up in chair in room.  States he feels better today.   Able to void without difficulty.  Denies any shortness of breath or chest pain at this time.   History taken from: Patient, patient's chart.    Review of Systems:    Review of Systems   Constitutional: Positive for activity change. Negative for chills and fever.   HENT: Negative for postnasal drip and tinnitus.    Respiratory: Positive for chest tightness and shortness of breath. Negative for apnea.    Cardiovascular: Negative for chest pain and palpitations.   Gastrointestinal: Negative for abdominal distention, nausea and vomiting.   Musculoskeletal: Positive for arthralgias and myalgias.   Skin: Positive for wound.   Neurological: Negative for tremors and seizures.   Psychiatric/Behavioral: Negative for  agitation, confusion and hallucinations.       Objective   Vital Signs  Temp: 97.4 F         Heart Rate: 76         Resp: 20          Blood Pressure: 136/66         Pulse Ox: 96%    Physical Exam:   Physical Exam  Vitals reviewed.   Constitutional:       Appearance: Normal appearance. He is not ill-appearing.   HENT:      Head: Normocephalic and atraumatic.      Mouth/Throat:      Mouth: Mucous membranes are moist.      Pharynx: Oropharynx is clear.   Eyes:      Extraocular Movements: Extraocular movements intact.      Pupils: Pupils are equal, round, and reactive to light.   Neck:      Vascular: No carotid bruit.   Cardiovascular:      Rate and Rhythm: Regular rhythm. Tachycardia present.      Pulses: Normal pulses.      Heart sounds: Normal heart sounds. No murmur heard.    No gallop.   Pulmonary:      Effort: Pulmonary effort is normal. No respiratory distress.      Breath sounds: Normal breath sounds. No rales.   Abdominal:      General: Bowel sounds are normal. There is no distension.      Palpations: Abdomen is soft.      Tenderness: There is no guarding.   Musculoskeletal:         General: Normal range of motion.      Cervical back: Normal range of motion and neck supple.      Right lower leg: Edema present.      Left lower leg: Edema present.   Skin:     General: Skin is warm and dry.      Capillary Refill: Capillary refill takes less than 2 seconds.      Findings: Bruising present.      Comments: S/P L surgical repair Charcot foot with external fixator.  Wound vac in place   Neurological:      General: No focal deficit present.      Mental Status: He is alert and oriented to person, place, and time.   Psychiatric:         Mood and Affect: Mood normal.         Behavior: Behavior normal.         Thought Content: Thought content normal.         Judgment: Judgment normal.       Results Review:       Results from last 7 days   Lab Units 04/28/23  0545 04/27/23  0650 04/26/23  0557 04/25/23  0739 04/24/23  0205  04/23/23  0644   SODIUM mmol/L 132* 131* 130* 133* 134* 136   POTASSIUM mmol/L 4.5 4.5 4.6 4.5 4.3 4.6   CHLORIDE mmol/L 99 98 98 99 99 102   CO2 mmol/L 21.0* 25.0 20.0* 15.0* 23.0 23.0   BUN mg/dL 30* 26* 33* 34* 24* 18   CREATININE mg/dL 1.28* 1.13 1.17 1.39* 1.17 1.11   GLUCOSE mg/dL 171* 163* 184* 305* 160* 193*   CALCIUM mg/dL 8.1* 8.4* 8.0* 8.5* 9.0 8.7   BILIRUBIN mg/dL 1.1  --   --  0.7 0.4 0.5   ALK PHOS U/L 201*  --   --  88 85 76   ALT (SGPT) U/L 652*  --   --  101* 62* 66*   AST (SGOT) U/L 344*  --   --  69* 31 42*     Results from last 7 days   Lab Units 04/28/23  0545 04/24/23  0205 04/23/23  0644   MAGNESIUM mg/dL 2.4 2.0 1.9     Results from last 7 days   Lab Units 04/28/23  0545 04/27/23  0650 04/26/23  1130 04/24/23  0205 04/23/23  0644   WBC 10*3/mm3 7.44 7.18 7.28 6.20 6.33   HEMOGLOBIN g/dL 9.5* 10.1* 10.1* 10.1* 10.2*   HEMATOCRIT % 28.9* 31.6* 29.9* 31.0* 30.8*   PLATELETS 10*3/mm3 48* 77* 92* 143 155     Lab Results   Component Value Date    CKTOTAL 119 04/23/2023    CKMB 2.16 04/23/2023    TROPONINI <0.012 11/19/2014    TROPONINT 52 (H) 04/25/2023     CO2   Date Value Ref Range Status   04/28/2023 21.0 (L) 22.0 - 29.0 mmol/L Final     Results from last 7 days   Lab Units 04/26/23  1130   INR  1.81*     Imaging Results (Most Recent)     Procedure Component Value Units Date/Time    US Renal Bilateral [[id number]] Collected: 04/26/23 0820     Updated: 04/26/23 1208    Narrative:      HISTORY:  Urinary retention.    COMPARISON:  None    TECHNIQUE:  Real-time ultrasound imaging and color Doppler used to evaluate the kidneys and  bladder.    FINDINGS:  The right kidney measures 13.1 x 5.9 x 4.5 cm. The left kidney measures 13 x 5.8  x 6.5 cm.  Cortical thickness and echotexture are within normal limits. There is  no hydronephrosis or evidence of mass. No calculus was seen.  Lower pole cyst on  the right measuring 4.9 x 4.1 x 4.6 cm.  This appears to be an anechoic  partially exophytic cyst.    The  bladder was collapsed around a Colbert catheter.      Impression:      A 5 cm lower pole right renal cyst appears simple.  No acute sonographic  abnormality.      CT Angiogram Chest [[id number]] Collected: 04/26/23 0831     Updated: 04/26/23 1137    Narrative:      EXAM:  CTA chest with contrast, PE protocol.    COMPARISON:  CTA chest PE protocol, 04/24/2023.    HISTORY:  Pulmonary embolism suspected, unknown D-dimer.  Shortness of breath.    TECHNIQUE:  Intravenous contrast was administered and axial images from the thoracic inlet  through the diaphragm were performed followed by 2D multiplanar reformats.  MIPS  were reconstructed and reviewed.    FINDINGS:  Limited by respiratory motion artifact.    Mild bilateral dependent atelectasis.  Lungs are otherwise clear.  No  consolidation.    Trace bilateral pleural effusions.  Esophagus is collapsed and poorly evaluated.  Mild bilateral gynecomastia.  There is some mild lymphadenopathy suspected in  the upper mediastinum, nonspecific.  No lymphadenopathy identified elsewhere in  the chest by size criteria.    Extensive bilateral pulmonary emboli with saddle embolus extending across the  bifurcation of the main pulmonary artery.  There is extensive embolic disease  throughout all lobes of both lungs. The Main  pulmonary artery is enlarged  measuring up to 4.6 cm in diameter.  There is abnormal flattening or even  leftward convexity of the intraventricular septum.    Limited evaluation of the upper abdomen due to motion.  No gross acute osseous  abnormality.      Impression:      1.  Extensive bilateral pulmonary emboli with saddle embolus extending across  the main pulmonary artery bifurcation.  There is enlargement of the main  pulmonary artery as well as leftward bowing or convexity of the intraventricular  septum.  These findings may be seen in the setting of right heart strain.    2.  The exam is otherwise limited by motion.  There is mild dependent  atelectasis.   Lungs are otherwise grossly clear.    3.  Mild lymphadenopathy in the upper mediastinum, nonspecific.  Consider  reevaluation on short-term follow-up CT chest.        XR Chest 1 View [[id number]] Collected: 04/25/23 0744     Updated: 04/25/23 0902    Narrative:      HISTORY:  Post code.    COMPARISON:  4/23/2023    FINDINGS:  AP view of the chest demonstrates clear lungs and mild cardiomegaly.  There is  no pneumothorax.  Left-sided AICD is unchanged.      Impression:      Mild cardiomegaly.      CT Angiogram Chest [[id number]] Collected: 04/24/23 0511     Updated: 04/24/23 0658    Narrative:      INDICATION:  Positive D-dimer    COMPARISON:  None    TECHNIQUE:  Volumetric CT scan of the chest, from the thoracic inlet to the level of the  diaphragms, with intravenous contrast.  2D axial, sagittal, and coronal  reformats were performed.  MIPS were performed on a separate workstation and  reviewed.    FINDINGS:  Lungs: No focal consolidation or mass.  Pleura: No effusion or pneumothorax.  Vessels: Thoracic aorta is normal in caliber.  Bones: No suspicious lesions.  Visualized upper abdomen: Unremarkable.      Impression:      I suspect there are bilateral pulmonary emboli, but it is difficult to tell with  certainty due to suboptimal contrast timing and significant motion artifact.  Repeat examination is recommended.    XR Chest 1 View [[id number]] Collected: 04/23/23 1630     Updated: 04/23/23 1648    Narrative:      HISTORY:  Shortness of breath.    FINDINGS:  Frontal film of the chest was obtained.  There is a dual-lead pacing system in  place via the left subclavian venous approach.  The pacing leads appear to be in  good position.  There is mild pulmonary vascular congestion with mild  cardiomegaly.  No infiltrate or effusion is seen.      Impression:      1.  Mild cardiomegaly with mild pulmonary vascular congestion.    2.  No acute infiltrate or effusion is seen.         No results found for: ACANTHNAEG, AFBCX,  BPERTUSSISCX, BLOODCX  No results found for: BCIDPCR, CXREFLEX, CSFCX, CULTURETIS  No results found for: CULTURES, HSVCX, URCX  No results found for: EYECULTURE, GCCX, HSVCULTURE, LABHSV  No results found for: LEGIONELLA, MRSACX, MUMPSCX, MYCOPLASCX  No results found for: NOCARDIACX, STOOLCX  No results found for: THROATCX, UNSTIMCULT, URINECX, CULTURE, VZVCULTUR  No results found for: VIRALCULTU, WOUNDCX  Medication Reviewed and Will Continue Unless Documented in Plan:   ascorbic acid, 1,000 mg, Oral, Nightly  cetirizine, 10 mg, Oral, Daily  vitamin D3, 5,000 Units, Oral, Nightly  docusate sodium, 100 mg, Oral, BID  dronedarone, 400 mg, Oral, Daily With Dinner  fondaparinux, 10 mg, Subcutaneous, Q24H  glipizide, 10 mg, Oral, QAM AC  Insulin Aspart, 2-12 Units, Subcutaneous, 4x Daily With Meals & Nightly  magnesium oxide, 400 mg, Oral, BID  metoprolol succinate XL, 25 mg, Oral, BID  senna-docusate sodium, 2 tablet, Oral, BID  sodium chloride, 3 mL, Intravenous, Q12H  tamsulosin, 0.4 mg, Oral, BID  vancomycin, 1,250 mg, Intravenous, Q12H      Pharmacy to dose vancomycin,       •  acetaminophen  •  bisacodyl  •  calcium carbonate  •  fluticasone  •  hydrALAZINE  •  hydrOXYzine pamoate  •  muscle rub  •  ondansetron  •  oxyCODONE-acetaminophen  •  Pharmacy to dose vancomycin  •  simethicone  •  sodium chloride  •  traZODone  Pharmacy to dose vancomycin,        Dietary Orders (From admission, onward)     Start     Ordered    04/27/23 1718  Diet: Diabetic Diets, Cardiac Diets; Healthy Heart (2-3 Na+); Consistent Carbohydrate; Texture: Regular Texture (IDDSI 7); Fluid Consistency: Thin (IDDSI 0)  Diet Effective Now        References:    Diet Order [place]   Question Answer Comment   Diets: Diabetic Diets    Diets: Cardiac Diets    Cardiac Diet: Healthy Heart (2-3 Na+)    Diabetic Diet: Consistent Carbohydrate    Texture: Regular Texture (IDDSI 7)    Fluid Consistency: Thin (IDDSI 0)        04/27/23 1718    04/26/23 1200   Dietary Nutrition Supplements Milk  Daily With Breakfast, Lunch & Dinner      Question:  Select Supplement:  Answer:  Milk    04/26/23 1116              History, physical exam, assessment and plan may have been partly or fully copied from before, but  changes made to the copied record to reflect care on the date of service. Part of the lab and imaging  reports auto populated and corrected. Some of this note may be an electronic transcription of spoken  language to printed text. This may permit erroneous, or at times, nonsensical words or phrases to be  inadvertently transcribed. Although I have reviewed the note for such errors, some may still exist.      This document has been electronically signed by Charlie Persaud MD on April 28, 2023 09:33 CDT

## 2023-04-29 ENCOUNTER — OUTSIDE FACILITY SERVICE (OUTPATIENT)
Dept: PULMONOLOGY | Facility: CLINIC | Age: 63
End: 2023-04-29
Payer: MEDICARE

## 2023-04-29 LAB
ALBUMIN SERPL-MCNC: 3.6 G/DL (ref 3.5–5.2)
ALBUMIN/GLOB SERPL: 1.3 G/DL
ALP SERPL-CCNC: 201 U/L (ref 39–117)
ALT SERPL W P-5'-P-CCNC: 480 U/L (ref 1–41)
ANION GAP SERPL CALCULATED.3IONS-SCNC: 12 MMOL/L (ref 5–15)
AST SERPL-CCNC: 141 U/L (ref 1–40)
BASOPHILS # BLD AUTO: 0.04 10*3/MM3 (ref 0–0.2)
BASOPHILS NFR BLD AUTO: 0.6 % (ref 0–1.5)
BILIRUB SERPL-MCNC: 1.1 MG/DL (ref 0–1.2)
BUN SERPL-MCNC: 26 MG/DL (ref 8–23)
BUN/CREAT SERPL: 23 (ref 7–25)
CALCIUM SPEC-SCNC: 8 MG/DL (ref 8.6–10.5)
CHLORIDE SERPL-SCNC: 100 MMOL/L (ref 98–107)
CO2 SERPL-SCNC: 21 MMOL/L (ref 22–29)
CREAT SERPL-MCNC: 1.13 MG/DL (ref 0.76–1.27)
DEPRECATED RDW RBC AUTO: 48.5 FL (ref 37–54)
EGFRCR SERPLBLD CKD-EPI 2021: 73.9 ML/MIN/1.73
EOSINOPHIL # BLD AUTO: 0.08 10*3/MM3 (ref 0–0.4)
EOSINOPHIL NFR BLD AUTO: 1.2 % (ref 0.3–6.2)
ERYTHROCYTE [DISTWIDTH] IN BLOOD BY AUTOMATED COUNT: 14.8 % (ref 12.3–15.4)
FERRITIN SERPL-MCNC: 873.1 NG/ML (ref 30–400)
GLOBULIN UR ELPH-MCNC: 2.7 GM/DL
GLUCOSE BLDC GLUCOMTR-MCNC: 140 MG/DL (ref 70–130)
GLUCOSE BLDC GLUCOMTR-MCNC: 170 MG/DL (ref 70–130)
GLUCOSE BLDC GLUCOMTR-MCNC: 207 MG/DL (ref 70–130)
GLUCOSE BLDC GLUCOMTR-MCNC: 284 MG/DL (ref 70–130)
GLUCOSE SERPL-MCNC: 161 MG/DL (ref 65–99)
HCT VFR BLD AUTO: 28.6 % (ref 37.5–51)
HGB BLD-MCNC: 9.4 G/DL (ref 13–17.7)
IMM GRANULOCYTES # BLD AUTO: 0.13 10*3/MM3 (ref 0–0.05)
IMM GRANULOCYTES NFR BLD AUTO: 1.9 % (ref 0–0.5)
IRON 24H UR-MRATE: 35 MCG/DL (ref 59–158)
IRON SATN MFR SERPL: 9 % (ref 20–50)
LYMPHOCYTES # BLD AUTO: 1.2 10*3/MM3 (ref 0.7–3.1)
LYMPHOCYTES NFR BLD AUTO: 17.6 % (ref 19.6–45.3)
MAGNESIUM SERPL-MCNC: 2.3 MG/DL (ref 1.6–2.4)
MCH RBC QN AUTO: 30.3 PG (ref 26.6–33)
MCHC RBC AUTO-ENTMCNC: 32.9 G/DL (ref 31.5–35.7)
MCV RBC AUTO: 92.3 FL (ref 79–97)
MONOCYTES # BLD AUTO: 0.78 10*3/MM3 (ref 0.1–0.9)
MONOCYTES NFR BLD AUTO: 11.5 % (ref 5–12)
NEUTROPHILS NFR BLD AUTO: 4.57 10*3/MM3 (ref 1.7–7)
NEUTROPHILS NFR BLD AUTO: 67.2 % (ref 42.7–76)
NRBC BLD AUTO-RTO: 1.2 /100 WBC (ref 0–0.2)
PLATELET # BLD AUTO: 60 10*3/MM3 (ref 140–450)
PMV BLD AUTO: 12.1 FL (ref 6–12)
POTASSIUM SERPL-SCNC: 4.1 MMOL/L (ref 3.5–5.2)
PROT SERPL-MCNC: 6.3 G/DL (ref 6–8.5)
RBC # BLD AUTO: 3.1 10*6/MM3 (ref 4.14–5.8)
SODIUM SERPL-SCNC: 133 MMOL/L (ref 136–145)
TIBC SERPL-MCNC: 384 MCG/DL (ref 298–536)
TRANSFERRIN SERPL-MCNC: 258 MG/DL (ref 200–360)
WBC NRBC COR # BLD: 6.8 10*3/MM3 (ref 3.4–10.8)

## 2023-04-29 PROCEDURE — 83540 ASSAY OF IRON: CPT | Performed by: INTERNAL MEDICINE

## 2023-04-29 PROCEDURE — 25010000002 VANCOMYCIN 10 G RECONSTITUTED SOLUTION: Performed by: INTERNAL MEDICINE

## 2023-04-29 PROCEDURE — 84466 ASSAY OF TRANSFERRIN: CPT | Performed by: INTERNAL MEDICINE

## 2023-04-29 PROCEDURE — 82728 ASSAY OF FERRITIN: CPT | Performed by: INTERNAL MEDICINE

## 2023-04-29 PROCEDURE — 82948 REAGENT STRIP/BLOOD GLUCOSE: CPT

## 2023-04-29 PROCEDURE — 83735 ASSAY OF MAGNESIUM: CPT | Performed by: INTERNAL MEDICINE

## 2023-04-29 PROCEDURE — 85025 COMPLETE CBC W/AUTO DIFF WBC: CPT | Performed by: INTERNAL MEDICINE

## 2023-04-29 PROCEDURE — 99232 SBSQ HOSP IP/OBS MODERATE 35: CPT | Performed by: INTERNAL MEDICINE

## 2023-04-29 PROCEDURE — 25010000002 FONDAPARINUX PER 0.5 MG: Performed by: INTERNAL MEDICINE

## 2023-04-29 PROCEDURE — 80053 COMPREHEN METABOLIC PANEL: CPT | Performed by: INTERNAL MEDICINE

## 2023-04-29 RX ORDER — DOCUSATE SODIUM 100 MG/1
200 CAPSULE, LIQUID FILLED ORAL 2 TIMES DAILY
Status: DISCONTINUED | OUTPATIENT
Start: 2023-04-29 | End: 2023-05-15 | Stop reason: HOSPADM

## 2023-04-29 NOTE — PROGRESS NOTES
Pharmacokinetics by Pharmacy - Vancomycin    Emre Lisa is a 63 y.o. male receiving vancomycin 1250mg IV Q12H day 16 for bone and/or joint infection    Objective:  [Ht:  ; Wt: (!) 170 kg (375 lb 11.2 oz)]     WBC   Date Value Ref Range Status   04/29/2023 6.80 3.40 - 10.80 10*3/mm3 Final   04/28/2023 7.44 3.40 - 10.80 10*3/mm3 Final   04/27/2023 7.18 3.40 - 10.80 10*3/mm3 Final    No results found for: CRP, LACTATE   Temp Readings from Last 1 Encounters:   04/21/23 98.6 °F (37 °C)     Estimated Creatinine Clearance: 111.7 mL/min (by C-G formula based on SCr of 1.13 mg/dL).   Creatinine   Date Value Ref Range Status   04/29/2023 1.13 0.76 - 1.27 mg/dL Final   04/28/2023 1.28 (H) 0.76 - 1.27 mg/dL Final   04/27/2023 1.13 0.76 - 1.27 mg/dL Final       Vancomycin Trough   Date Value Ref Range Status   04/28/2023 15.20 5.00 - 20.00 mcg/mL Final     Vancomycin Random   Date Value Ref Range Status   04/28/2023 38.90 5.00 - 40.00 mcg/mL Final       Culture Results:  Microbiology Results (last 10 days)     ** No results found for the last 240 hours. **        No results found for: RESPCX      Assessment:    WBC 6.8, WNL   Scr 1.13, stable  Vancomycin levels WNL yesterday.     Vancomycin trough and AUC goals 10-20 and 400-600, respectively.     Plan:  1. Continue vancomycin 1250mg IV Q12H. Consult ends 5/26/23.   2. Will order levels when clinically indicated   3. Pharmacy will monitor renal function and adjust dose accordingly.      Eloy Mendoza [facility]   04/29/23 11:34 CDT

## 2023-04-29 NOTE — ACP (ADVANCE CARE PLANNING)
[facility] @ [facility]  INPATIENT PROGRESS NOTE    PATIENT NAME: Emre Lisa      PHYSICIAN: Charlie Persaud MD  : 1960        MRN: [id number]  Patient Care Team:  Jamaal Bravo MD as PCP - Marco A Chauhan DPM as Consulting Physician (Podiatry)    Chief Complaint:  Continued antibiotic therapy and medical management  History of Present Illness: Patient is a 62 y/o male with a pmh of type 2 DM, hypertension, CAD, and neuropathy.  He recently underwent surgical repair of Charcot deformity with external fixator stabilization to his left foot related to cellulitis.  He tolerated the surgical procedure well.  He will need IV antibiotic therapy through 23 due to bone/joint infection.  He remains non-weight bearing of left lower extremity.  He tells me the pain to his left foot remains controlled, however, he does have a torn left rotator cuff and arthritis so his hip and shoulder are the source of his pain.  He tells me he is doing well and he has no other complaints or concerns.  I have reviewed the patients most recent labs and diagnostics independently.  Glucose 124, BUN 15, creatinine 0.89, sodium 138, potassium 4.2, chloride 105, co2 24, wbc 5.25, hgb 9.9, hct 30.1, platelets 156.       Assessment      Cellulitis of left foot  Charcot's joint of left foot  Type 2 DM  Arthritis  A-fib  Hypertension  Rheumatoid arthritis  DVT & GI prophylaxis     DVT Prophylaxis: Heparin  GI Prophylaxis: Protonix  Code Status: Full    Plan     -Clinically improving.  -Improvement in LFTs.  -Patient's oxygen requirement has gone down to 40 L.  -Patient is resting comfortably.  -Patient complains of having constipation.  -We will recommend soapsuds enema and.  -Continue with aggressive therapy.  -GI/pulmonary and hematology oncology follow-up appreciated.  -Improvement in platelet count of 60,000.  We will continue monitoring blood work closely.  -DVT  and GI prophylaxis in place.  -We'll continue monitoring patient in hospital setting and treat patient as course dictates.  -Please review orders for detailed plan of care.  -For Acute and Chronic medical condition we will continue medications described below in medication section which have been reviewed and we will continue unless changed in plan of care.  -Laboratory and diagnostic studies have been independently reviewed and the reports are reviewed as documented below.  -Total critical care time spent 37 minutes excluding time spent on separate billable procedures. Time spent does not coincide with other critical care providers. Patient is critically ill requiring continued close observation and management in ICU level setting primarily due to hypotensive/hypoxic respiratory failure. Time was exclusive to this patient and does not include time spent teaching, updating family or performing procedures.    Subjective   Interval History:   Patient Complaints:   Patient seen and examined.  Patient resting comfortably.  No overnight events noted.  Feeling somewhat better.  Complaining of having constipation and urinary difficulty.  History taken from: Patient, patient's chart.    Review of Systems:    Review of Systems   Constitutional: Positive for activity change. Negative for chills and fever.   HENT: Negative for postnasal drip and tinnitus.    Respiratory: Positive for chest tightness and shortness of breath. Negative for apnea.    Cardiovascular: Negative for chest pain and palpitations.   Gastrointestinal: Negative for abdominal distention, nausea and vomiting.   Musculoskeletal: Positive for arthralgias and myalgias.   Skin: Positive for wound.   Neurological: Negative for tremors and seizures.   Psychiatric/Behavioral: Negative for agitation, confusion and hallucinations.       Objective   Vital Signs:  Temp: 97.8 F         Heart Rate: 99         resp: 20          blood Pressure: 128/74         Pulse Ox: 97  %    Physical Exam:   Physical Exam  Vitals reviewed.   Constitutional:       Appearance: Normal appearance. He is not ill-appearing.   HENT:      Head: Normocephalic and atraumatic.      Mouth/Throat:      Mouth: Mucous membranes are moist.      Pharynx: Oropharynx is clear.   Eyes:      Extraocular Movements: Extraocular movements intact.      Pupils: Pupils are equal, round, and reactive to light.   Neck:      Vascular: No carotid bruit.   Cardiovascular:      Rate and Rhythm: Regular rhythm. Tachycardia present.      Pulses: Normal pulses.      Heart sounds: Normal heart sounds. No murmur heard.    No gallop.   Pulmonary:      Effort: Pulmonary effort is normal. No respiratory distress.      Breath sounds: Normal breath sounds. No rales.   Abdominal:      General: Bowel sounds are normal. There is no distension.      Palpations: Abdomen is soft.      Tenderness: There is no guarding.   Musculoskeletal:         General: Normal range of motion.      Cervical back: Normal range of motion and neck supple.      Right lower leg: Edema present.      Left lower leg: Edema present.   Skin:     General: Skin is warm and dry.      Capillary Refill: Capillary refill takes less than 2 seconds.      Findings: Bruising present.      Comments: S/P L surgical repair Charcot foot with external fixator.  Wound vac in place   Neurological:      General: No focal deficit present.      Mental Status: He is alert and oriented to person, place, and time.   Psychiatric:         Mood and Affect: Mood normal.         Behavior: Behavior normal.         Thought Content: Thought content normal.         Judgment: Judgment normal.       Results Review:       Results from last 7 days   Lab Units 04/29/23  0517 04/28/23  0545 04/27/23  0650 04/26/23  0557 04/25/23  0739 04/24/23  0205 04/23/23  0644   SODIUM mmol/L 133* 132* 131* 130* 133* 134* 136   POTASSIUM mmol/L 4.1 4.5 4.5 4.6 4.5 4.3 4.6   CHLORIDE mmol/L 100 99 98 98 99 99 102   CO2  mmol/L 21.0* 21.0* 25.0 20.0* 15.0* 23.0 23.0   BUN mg/dL 26* 30* 26* 33* 34* 24* 18   CREATININE mg/dL 1.13 1.28* 1.13 1.17 1.39* 1.17 1.11   GLUCOSE mg/dL 161* 171* 163* 184* 305* 160* 193*   CALCIUM mg/dL 8.0* 8.1* 8.4* 8.0* 8.5* 9.0 8.7   BILIRUBIN mg/dL 1.1 1.1  --   --  0.7 0.4 0.5   ALK PHOS U/L 201* 201*  --   --  88 85 76   ALT (SGPT) U/L 480* 652*  --   --  101* 62* 66*   AST (SGOT) U/L 141* 344*  --   --  69* 31 42*     Results from last 7 days   Lab Units 04/29/23  0517 04/28/23  0545 04/24/23  0205 04/23/23  0644   MAGNESIUM mg/dL 2.3 2.4 2.0 1.9     Results from last 7 days   Lab Units 04/29/23  0517 04/28/23  0545 04/27/23  0650 04/26/23  1130 04/24/23  0205 04/23/23  0644   WBC 10*3/mm3 6.80 7.44 7.18 7.28 6.20 6.33   HEMOGLOBIN g/dL 9.4* 9.5* 10.1* 10.1* 10.1* 10.2*   HEMATOCRIT % 28.6* 28.9* 31.6* 29.9* 31.0* 30.8*   PLATELETS 10*3/mm3 60* 48* 77* 92* 143 155     Lab Results   Component Value Date    CKTOTAL 119 04/23/2023    CKMB 2.16 04/23/2023    TROPONINI <0.012 11/19/2014    TROPONINT 52 (H) 04/25/2023     CO2   Date Value Ref Range Status   04/29/2023 21.0 (L) 22.0 - 29.0 mmol/L Final     Results from last 7 days   Lab Units 04/26/23  1130   INR  1.81*     Imaging Results (Most Recent)     Procedure Component Value Units Date/Time    US Venous Doppler Lower Extremity Bilateral (duplex) [[id number]] Collected: 04/28/23 2142     Updated: 04/28/23 2154    Narrative:      EXAMINATION:  Ultrasound bilateral lower extremity venous Doppler.    COMPARISON:  No comparison.    HISTORY:  Positive PE, concern for DVT.    TECHNIQUE:  High-resolution gray scale imaging, color flow Doppler, compression were  performed from the groin to the calf of the bilateral lower extremities.  Augmentation was performed of the bilateral common femoral vein and popliteal  vein.    FINDINGS:  There is DVT within both popliteal veins.  The remaining deep veins appear  patent with normal compressibility.    There is a  complex collection in the left popliteal fossa containing shadowing  calcification or two calcifications.      Impression:      1.  Bilateral DVT within the popliteal veins.    2.  Complex cystic area in the left popliteal fossa measuring up to 6.2 cm.  This may represent a complex Baker's cyst with loose bodies or calcifications.    US Renal Bilateral [[id number]] Collected: 04/26/23 0820     Updated: 04/26/23 1208    Narrative:      HISTORY:  Urinary retention.    COMPARISON:  None    TECHNIQUE:  Real-time ultrasound imaging and color Doppler used to evaluate the kidneys and  bladder.    FINDINGS:  The right kidney measures 13.1 x 5.9 x 4.5 cm. The left kidney measures 13 x 5.8  x 6.5 cm.  Cortical thickness and echotexture are within normal limits. There is  no hydronephrosis or evidence of mass. No calculus was seen.  Lower pole cyst on  the right measuring 4.9 x 4.1 x 4.6 cm.  This appears to be an anechoic  partially exophytic cyst.    The bladder was collapsed around a Colbert catheter.      Impression:      A 5 cm lower pole right renal cyst appears simple.  No acute sonographic  abnormality.      CT Angiogram Chest [[id number]] Collected: 04/26/23 0831     Updated: 04/26/23 1137    Narrative:      EXAM:  CTA chest with contrast, PE protocol.    COMPARISON:  CTA chest PE protocol, 04/24/2023.    HISTORY:  Pulmonary embolism suspected, unknown D-dimer.  Shortness of breath.    TECHNIQUE:  Intravenous contrast was administered and axial images from the thoracic inlet  through the diaphragm were performed followed by 2D multiplanar reformats.  MIPS  were reconstructed and reviewed.    FINDINGS:  Limited by respiratory motion artifact.    Mild bilateral dependent atelectasis.  Lungs are otherwise clear.  No  consolidation.    Trace bilateral pleural effusions.  Esophagus is collapsed and poorly evaluated.  Mild bilateral gynecomastia.  There is some mild lymphadenopathy suspected in  the upper mediastinum,  nonspecific.  No lymphadenopathy identified elsewhere in  the chest by size criteria.    Extensive bilateral pulmonary emboli with saddle embolus extending across the  bifurcation of the main pulmonary artery.  There is extensive embolic disease  throughout all lobes of both lungs. The Main  pulmonary artery is enlarged  measuring up to 4.6 cm in diameter.  There is abnormal flattening or even  leftward convexity of the intraventricular septum.    Limited evaluation of the upper abdomen due to motion.  No gross acute osseous  abnormality.      Impression:      1.  Extensive bilateral pulmonary emboli with saddle embolus extending across  the main pulmonary artery bifurcation.  There is enlargement of the main  pulmonary artery as well as leftward bowing or convexity of the intraventricular  septum.  These findings may be seen in the setting of right heart strain.    2.  The exam is otherwise limited by motion.  There is mild dependent  atelectasis.  Lungs are otherwise grossly clear.    3.  Mild lymphadenopathy in the upper mediastinum, nonspecific.  Consider  reevaluation on short-term follow-up CT chest.        XR Chest 1 View [[id number]] Collected: 04/25/23 0744     Updated: 04/25/23 0902    Narrative:      HISTORY:  Post code.    COMPARISON:  4/23/2023    FINDINGS:  AP view of the chest demonstrates clear lungs and mild cardiomegaly.  There is  no pneumothorax.  Left-sided AICD is unchanged.      Impression:      Mild cardiomegaly.      CT Angiogram Chest [[id number]] Collected: 04/24/23 0511     Updated: 04/24/23 0658    Narrative:      INDICATION:  Positive D-dimer    COMPARISON:  None    TECHNIQUE:  Volumetric CT scan of the chest, from the thoracic inlet to the level of the  diaphragms, with intravenous contrast.  2D axial, sagittal, and coronal  reformats were performed.  MIPS were performed on a separate workstation and  reviewed.    FINDINGS:  Lungs: No focal consolidation or mass.  Pleura: No effusion or  pneumothorax.  Vessels: Thoracic aorta is normal in caliber.  Bones: No suspicious lesions.  Visualized upper abdomen: Unremarkable.      Impression:      I suspect there are bilateral pulmonary emboli, but it is difficult to tell with  certainty due to suboptimal contrast timing and significant motion artifact.  Repeat examination is recommended.    XR Chest 1 View [[id number]] Collected: 04/23/23 1630     Updated: 04/23/23 1648    Narrative:      HISTORY:  Shortness of breath.    FINDINGS:  Frontal film of the chest was obtained.  There is a dual-lead pacing system in  place via the left subclavian venous approach.  The pacing leads appear to be in  good position.  There is mild pulmonary vascular congestion with mild  cardiomegaly.  No infiltrate or effusion is seen.      Impression:      1.  Mild cardiomegaly with mild pulmonary vascular congestion.    2.  No acute infiltrate or effusion is seen.         No results found for: ACANTHNAEG, AFBCX, BPERTUSSISCX, BLOODCX  No results found for: BCIDPCR, CXREFLEX, CSFCX, CULTURETIS  No results found for: CULTURES, HSVCX, URCX  No results found for: EYECULTURE, GCCX, HSVCULTURE, LABHSV  No results found for: LEGIONELLA, MRSACX, MUMPSCX, MYCOPLASCX  No results found for: NOCARDIACX, STOOLCX  No results found for: THROATCX, UNSTIMCULT, URINECX, CULTURE, VZVCULTUR  No results found for: VIRALCULTU, WOUNDCX  Medication Reviewed and Will Continue Unless Documented in Plan:   ascorbic acid, 1,000 mg, Oral, Nightly  cetirizine, 10 mg, Oral, Daily  vitamin D3, 5,000 Units, Oral, Nightly  docusate sodium, 100 mg, Oral, BID  [START ON 4/30/2023] dronedarone, 400 mg, Oral, Daily With Dinner  fondaparinux, 10 mg, Subcutaneous, Q24H  glipizide, 10 mg, Oral, QAM AC  Insulin Aspart, 2-12 Units, Subcutaneous, 4x Daily With Meals & Nightly  magnesium oxide, 400 mg, Oral, BID  metoprolol succinate XL, 25 mg, Oral, BID  senna-docusate sodium, 2 tablet, Oral, BID  sodium bicarbonate, 650  mg, Oral, TID  sodium chloride, 3 mL, Intravenous, Q12H  sodium chloride, 100 mL/hr, Intravenous, Once  tamsulosin, 0.4 mg, Oral, BID  vancomycin, 1,250 mg, Intravenous, Q12H      Pharmacy to dose vancomycin,       •  acetaminophen  •  bisacodyl  •  calcium carbonate  •  fluticasone  •  hydrALAZINE  •  hydrOXYzine pamoate  •  muscle rub  •  ondansetron  •  oxyCODONE-acetaminophen  •  Pharmacy to dose vancomycin  •  simethicone  •  sodium chloride  •  traZODone  Pharmacy to dose vancomycin,        Dietary Orders (From admission, onward)     Start     Ordered    04/27/23 1718  Diet: Diabetic Diets, Cardiac Diets; Healthy Heart (2-3 Na+); Consistent Carbohydrate; Texture: Regular Texture (IDDSI 7); Fluid Consistency: Thin (IDDSI 0)  Diet Effective Now        References:    Diet Order [facility]   Question Answer Comment   Diets: Diabetic Diets    Diets: Cardiac Diets    Cardiac Diet: Healthy Heart (2-3 Na+)    Diabetic Diet: Consistent Carbohydrate    Texture: Regular Texture (IDDSI 7)    Fluid Consistency: Thin (IDDSI 0)        04/27/23 1718    04/26/23 1200  Dietary Nutrition Supplements Milk  Daily With Breakfast, Lunch & Dinner      Question:  Select Supplement:  Answer:  Milk    04/26/23 1116              History, physical exam, assessment and plan may have been partly or fully copied from before, but  changes made to the copied record to reflect care on the date of service. Part of the lab and imaging  reports auto populated and corrected. Some of this note may be an electronic transcription of spoken  language to printed text. This may permit erroneous, or at times, nonsensical words or phrases to be  inadvertently transcribed. Although I have reviewed the note for such errors, some may still exist.      This document has been electronically signed by Charlie Persaud MD on April 29, 2023 16:23 CDT

## 2023-04-29 NOTE — PROGRESS NOTES
Subjective     Emre Lisa who is seen in follow-up.  He reportedly feels better.  Eating well.  No bleeding issues.    Past Medical History, Past Surgical History, Social History, Family History have been reviewed and are without significant changes except as mentioned.    Review of Systems   Constitutional: Positive for activity change and fatigue. Negative for chills and unexpected weight change.   HENT: Negative for congestion, hearing loss, sore throat and voice change.    Respiratory: Positive for cough and shortness of breath. Negative for chest tightness and wheezing.    Cardiovascular: Positive for leg swelling. Negative for chest pain and palpitations.   Gastrointestinal: Negative for abdominal pain, blood in stool, constipation, diarrhea, nausea and vomiting.   Endocrine: Negative for cold intolerance, heat intolerance and polydipsia.   Genitourinary: Negative for difficulty urinating, dysuria, frequency and urgency.   Musculoskeletal: Positive for arthralgias, back pain and gait problem. Negative for joint swelling.   Skin: Negative for color change, pallor and rash.   Neurological: Positive for weakness and numbness. Negative for dizziness and headaches.   Hematological: Negative for adenopathy. Does not bruise/bleed easily.   Psychiatric/Behavioral: Negative for agitation, decreased concentration and dysphoric mood. The patient is not nervous/anxious.             Medications:  The current medication list was reviewed in the EMR    ALLERGIES:    Allergies   Allergen Reactions   • Januvia [Sitagliptin] Hives   • Lipitor [Atorvastatin] Other (See Comments)     Muscle Cramps...   • Shellfish Allergy Other (See Comments)   • Sulfa Antibiotics Rash       Objective      Vitals:    04/28/23 0834 04/28/23 1615 04/29/23 0135 04/29/23 0139   Pulse: 100 94 104 98   Weight:              View : No data to display.                Physical Exam  Vitals and nursing note reviewed.   Constitutional:        General: He is not in acute distress.     Appearance: He is obese.   HENT:      Mouth/Throat:      Mouth: Mucous membranes are moist.      Pharynx: No oropharyngeal exudate.   Eyes:      General: No scleral icterus.     Pupils: Pupils are equal, round, and reactive to light.   Cardiovascular:      Rate and Rhythm: Normal rate. Rhythm irregular.      Heart sounds: No murmur heard.  Pulmonary:      Effort: Pulmonary effort is normal. No respiratory distress.      Breath sounds: Normal breath sounds.   Abdominal:      General: There is no distension.      Palpations: Abdomen is soft. There is no mass.      Tenderness: There is no abdominal tenderness.   Musculoskeletal:      Right lower leg: Edema present.      Left lower leg: Edema present.   Lymphadenopathy:      Cervical: No cervical adenopathy.   Skin:     General: Skin is dry.      Coloration: Skin is not pale.      Findings: No bruising.   Neurological:      General: No focal deficit present.      Mental Status: He is alert and oriented to person, place, and time. Mental status is at baseline.      Cranial Nerves: No cranial nerve deficit.      Coordination: Coordination normal.   Psychiatric:         Mood and Affect: Mood normal.         Behavior: Behavior normal.         Thought Content: Thought content normal.               RECENT LABS:Independently reviewed and summarized  Hematology WBC   Date Value Ref Range Status   04/29/2023 6.80 3.40 - 10.80 10*3/mm3 Final     RBC   Date Value Ref Range Status   04/29/2023 3.10 (L) 4.14 - 5.80 10*6/mm3 Final     Hemoglobin   Date Value Ref Range Status   04/29/2023 9.4 (L) 13.0 - 17.7 g/dL Final     Hematocrit   Date Value Ref Range Status   04/29/2023 28.6 (L) 37.5 - 51.0 % Final     Platelets   Date Value Ref Range Status   04/29/2023 60 (L) 140 - 450 10*3/mm3 Final     Lab Results   Component Value Date    GLUCOSE 161 (H) 04/29/2023    BUN 26 (H) 04/29/2023    CREATININE 1.13 04/29/2023    EGFR 73.9 04/29/2023    BCR  23.0 04/29/2023    K 4.1 04/29/2023    CO2 21.0 (L) 04/29/2023    CALCIUM 8.0 (L) 04/29/2023    ALBUMIN 3.6 04/29/2023    BILITOT 1.1 04/29/2023     (H) 04/29/2023     (H) 04/29/2023          Imaging (independently reviewed and summarized):     US Venous Doppler Lower Extremity Bilateral (duplex)    Result Date: 4/28/2023  1.  Bilateral DVT within the popliteal veins. 2.  Complex cystic area in the left popliteal fossa measuring up to 6.2 cm. This may represent a complex Baker's cyst with loose bodies or calcifications.        Assessment & Plan     (1) Acute bilateral PE with saddle embolus  (2) Acute bilateral DVT     Differentials include heparin-induced thrombocytopenia versus overall immobility.  It profile have been ordered including HIT SHAINA and JOVANA.   Been switched to fondaparinux 10 mg subcutaneous injection daily.  We will continue this.  Avoid any heparin containing products.  Risk versus benefit of anticoagulation especially in the light of thrombocytopenia discussed at length with the patient and family.  Other thrombophilia testing has also been ordered and is currently pending.    (3) Thrombocytopenia     Suspect related to heparin-induced thrombocytopenia versus consumptive process.  Platelet count actually has improved to 60,000 compared to yesterday.  Continue to monitor platelet count daily and monitor for bleeding complications.    (4) Anemia     Suspect anemia related to inflammation.  Iron studies showed low iron saturation.  I will arrange for 4 doses of IV Ferrlecit 125 mg daily.      4/29/2023      CC:

## 2023-04-30 ENCOUNTER — OUTSIDE FACILITY SERVICE (OUTPATIENT)
Dept: PULMONOLOGY | Facility: CLINIC | Age: 63
End: 2023-04-30
Payer: MEDICARE

## 2023-04-30 LAB
ALBUMIN SERPL-MCNC: 3.5 G/DL (ref 3.5–5.2)
ALBUMIN/GLOB SERPL: 1.3 G/DL
ALP SERPL-CCNC: 192 U/L (ref 39–117)
ALT SERPL W P-5'-P-CCNC: 346 U/L (ref 1–41)
ANION GAP SERPL CALCULATED.3IONS-SCNC: 12 MMOL/L (ref 5–15)
AST SERPL-CCNC: 84 U/L (ref 1–40)
BASOPHILS # BLD AUTO: 0.03 10*3/MM3 (ref 0–0.2)
BASOPHILS NFR BLD AUTO: 0.4 % (ref 0–1.5)
BILIRUB SERPL-MCNC: 1.3 MG/DL (ref 0–1.2)
BUN SERPL-MCNC: 23 MG/DL (ref 8–23)
BUN/CREAT SERPL: 21.7 (ref 7–25)
CALCIUM SPEC-SCNC: 8 MG/DL (ref 8.6–10.5)
CHLORIDE SERPL-SCNC: 101 MMOL/L (ref 98–107)
CO2 SERPL-SCNC: 22 MMOL/L (ref 22–29)
CREAT SERPL-MCNC: 1.06 MG/DL (ref 0.76–1.27)
DEPRECATED RDW RBC AUTO: 49.7 FL (ref 37–54)
EGFRCR SERPLBLD CKD-EPI 2021: 79.8 ML/MIN/1.73
EOSINOPHIL # BLD AUTO: 0.06 10*3/MM3 (ref 0–0.4)
EOSINOPHIL NFR BLD AUTO: 0.9 % (ref 0.3–6.2)
ERYTHROCYTE [DISTWIDTH] IN BLOOD BY AUTOMATED COUNT: 14.8 % (ref 12.3–15.4)
GLOBULIN UR ELPH-MCNC: 2.6 GM/DL
GLUCOSE BLDC GLUCOMTR-MCNC: 193 MG/DL (ref 70–130)
GLUCOSE BLDC GLUCOMTR-MCNC: 203 MG/DL (ref 70–130)
GLUCOSE SERPL-MCNC: 183 MG/DL (ref 65–99)
HCT VFR BLD AUTO: 29.2 % (ref 37.5–51)
HGB BLD-MCNC: 9.3 G/DL (ref 13–17.7)
IMM GRANULOCYTES # BLD AUTO: 0.1 10*3/MM3 (ref 0–0.05)
IMM GRANULOCYTES NFR BLD AUTO: 1.5 % (ref 0–0.5)
LYMPHOCYTES # BLD AUTO: 0.85 10*3/MM3 (ref 0.7–3.1)
LYMPHOCYTES NFR BLD AUTO: 12.6 % (ref 19.6–45.3)
MAGNESIUM SERPL-MCNC: 2.1 MG/DL (ref 1.6–2.4)
MCH RBC QN AUTO: 29.3 PG (ref 26.6–33)
MCHC RBC AUTO-ENTMCNC: 31.8 G/DL (ref 31.5–35.7)
MCV RBC AUTO: 92.1 FL (ref 79–97)
MONOCYTES # BLD AUTO: 0.86 10*3/MM3 (ref 0.1–0.9)
MONOCYTES NFR BLD AUTO: 12.8 % (ref 5–12)
NEUTROPHILS NFR BLD AUTO: 4.83 10*3/MM3 (ref 1.7–7)
NEUTROPHILS NFR BLD AUTO: 71.8 % (ref 42.7–76)
NRBC BLD AUTO-RTO: 0.9 /100 WBC (ref 0–0.2)
PF4 HEPARIN CMPLX IGG SERPL IA: 2.17 OD (ref 0–0.4)
PLATELET # BLD AUTO: 77 10*3/MM3 (ref 140–450)
PMV BLD AUTO: 11.8 FL (ref 6–12)
POTASSIUM SERPL-SCNC: 4.3 MMOL/L (ref 3.5–5.2)
PROT SERPL-MCNC: 6.1 G/DL (ref 6–8.5)
RBC # BLD AUTO: 3.17 10*6/MM3 (ref 4.14–5.8)
SODIUM SERPL-SCNC: 135 MMOL/L (ref 136–145)
WBC NRBC COR # BLD: 6.73 10*3/MM3 (ref 3.4–10.8)

## 2023-04-30 PROCEDURE — 85025 COMPLETE CBC W/AUTO DIFF WBC: CPT | Performed by: INTERNAL MEDICINE

## 2023-04-30 PROCEDURE — 25010000002 FONDAPARINUX PER 0.5 MG: Performed by: INTERNAL MEDICINE

## 2023-04-30 PROCEDURE — 83735 ASSAY OF MAGNESIUM: CPT | Performed by: INTERNAL MEDICINE

## 2023-04-30 PROCEDURE — 80053 COMPREHEN METABOLIC PANEL: CPT | Performed by: INTERNAL MEDICINE

## 2023-04-30 PROCEDURE — 82948 REAGENT STRIP/BLOOD GLUCOSE: CPT

## 2023-04-30 PROCEDURE — 25010000002 VANCOMYCIN 10 G RECONSTITUTED SOLUTION: Performed by: INTERNAL MEDICINE

## 2023-04-30 NOTE — PROGRESS NOTES
Pharmacokinetics by Pharmacy - Vancomycin    Emre Lisa is a 63 y.o. male receiving vancomycin 1250mg IV Q12H day 17 for bone and/or joint infection    Objective:  [Ht: 182.9 cm; Wt: (!) 170 kg (375 lb 11.2 oz)]     WBC   Date Value Ref Range Status   04/30/2023 6.73 3.40 - 10.80 10*3/mm3 Final   04/29/2023 6.80 3.40 - 10.80 10*3/mm3 Final   04/28/2023 7.44 3.40 - 10.80 10*3/mm3 Final    No results found for: CRP, LACTATE   Temp Readings from Last 1 Encounters:   04/21/23 98.6 °F (37 °C)     Estimated Creatinine Clearance: 119 mL/min (by C-G formula based on SCr of 1.06 mg/dL).   Creatinine   Date Value Ref Range Status   04/30/2023 1.06 0.76 - 1.27 mg/dL Final   04/29/2023 1.13 0.76 - 1.27 mg/dL Final   04/28/2023 1.28 (H) 0.76 - 1.27 mg/dL Final       Vancomycin Trough   Date Value Ref Range Status   04/28/2023 15.20 5.00 - 20.00 mcg/mL Final     Vancomycin Random   Date Value Ref Range Status   04/28/2023 38.90 5.00 - 40.00 mcg/mL Final       Culture Results:  Microbiology Results (last 10 days)     ** No results found for the last 240 hours. **        No results found for: RESPCX    Assessment:    WBC 6.73, WNL   Scr 1.06  Vancomycin levels within range on 4/28/23.  Vancomycin trough and AUC goals 10-20 and 400-600, respectively.     Plan:  1. Continue vancomycin 1250mg IV Q12H. Consult ends 5/26/23.  2. Will order levels when clinically indicated  3. Pharmacy will monitor renal function and adjust dose accordingly.      Eloy Mendoza SONG   04/30/23 09:36 CDT

## 2023-04-30 NOTE — ACP (ADVANCE CARE PLANNING)
[facility] @ [facility]  INPATIENT PROGRESS NOTE    PATIENT NAME: Emre Lisa      PHYSICIAN: Charlie Persaud MD  : 1960        MRN: [id number]  Patient Care Team:  Jamaal Bravo MD as PCP - Marco A Chauhan DPM as Consulting Physician (Podiatry)    Chief Complaint:  Continued antibiotic therapy and medical management  History of Present Illness: Patient is a 62 y/o male with a pmh of type 2 DM, hypertension, CAD, and neuropathy.  He recently underwent surgical repair of Charcot deformity with external fixator stabilization to his left foot related to cellulitis.  He tolerated the surgical procedure well.  He will need IV antibiotic therapy through 23 due to bone/joint infection.  He remains non-weight bearing of left lower extremity.  He tells me the pain to his left foot remains controlled, however, he does have a torn left rotator cuff and arthritis so his hip and shoulder are the source of his pain.  He tells me he is doing well and he has no other complaints or concerns.  I have reviewed the patients most recent labs and diagnostics independently.  Glucose 124, BUN 15, creatinine 0.89, sodium 138, potassium 4.2, chloride 105, co2 24, wbc 5.25, hgb 9.9, hct 30.1, platelets 156.       Assessment      Cellulitis of left foot  Charcot's joint of left foot  Type 2 DM  Arthritis  A-fib  Hypertension  Rheumatoid arthritis  DVT & GI prophylaxis     DVT Prophylaxis: Heparin  GI Prophylaxis: Protonix  Code Status: Full    Plan     -LFTs and platelet count seems to be improving.  -Continue fondaparinux.  -Continue with oxygen supplementation, wean as tolerated.  -Patient seen to resting comfortably.  -Continue therapy we will try to get patient out of bed to chair as tolerated.  -All specialists and consultants follow-up appreciated.  -Patient nhung clinically stable and improved.  -DVT and GI prophylaxis in place.  -We'll continue  monitoring patient in hospital setting and treat patient as course dictates.  -Please review orders for detailed plan of care.  -For Acute and Chronic medical condition we will continue medications described below in medication section which have been reviewed and we will continue unless changed in plan of care.  -Laboratory and diagnostic studies have been independently reviewed and the reports are reviewed as documented below.      Subjective   Interval History:   Patient Complaints:   Patient seen and examined.  No significant events reported overnight.  Patient resting comfortably at the time of evaluation.  History taken from: Patient, patient's chart.    Review of Systems:    Review of Systems   Constitutional: Positive for activity change. Negative for chills and fever.   HENT: Negative for postnasal drip and tinnitus.    Respiratory: Positive for chest tightness and shortness of breath. Negative for apnea.    Cardiovascular: Negative for chest pain and palpitations.   Gastrointestinal: Negative for abdominal distention, nausea and vomiting.   Musculoskeletal: Positive for arthralgias and myalgias.   Skin: Positive for wound.   Neurological: Negative for tremors and seizures.   Psychiatric/Behavioral: Negative for agitation, confusion and hallucinations.       Objective   Vital Signs:  Temp: 97.5 F         Heart Rate: 101         Resp: 24          Blood Pressure: 113/68         Pulse Ox: 94 %    Physical Exam:   Physical Exam  Vitals reviewed.   Constitutional:       Appearance: Normal appearance. He is not ill-appearing.   HENT:      Head: Normocephalic and atraumatic.      Mouth/Throat:      Mouth: Mucous membranes are moist.      Pharynx: Oropharynx is clear.   Eyes:      Extraocular Movements: Extraocular movements intact.      Pupils: Pupils are equal, round, and reactive to light.   Neck:      Vascular: No carotid bruit.   Cardiovascular:      Rate and Rhythm: Regular rhythm. Tachycardia present.       Pulses: Normal pulses.      Heart sounds: Normal heart sounds. No murmur heard.    No gallop.   Pulmonary:      Effort: Pulmonary effort is normal. No respiratory distress.      Breath sounds: Normal breath sounds. No rales.   Abdominal:      General: Bowel sounds are normal. There is no distension.      Palpations: Abdomen is soft.      Tenderness: There is no guarding.   Musculoskeletal:         General: Normal range of motion.      Cervical back: Normal range of motion and neck supple.      Right lower leg: Edema present.      Left lower leg: Edema present.   Skin:     General: Skin is warm and dry.      Capillary Refill: Capillary refill takes less than 2 seconds.      Findings: Bruising present.      Comments: S/P L surgical repair Charcot foot with external fixator.  Wound vac in place   Neurological:      General: No focal deficit present.      Mental Status: He is alert and oriented to person, place, and time.   Psychiatric:         Mood and Affect: Mood normal.         Behavior: Behavior normal.         Thought Content: Thought content normal.         Judgment: Judgment normal.       Results Review:       Results from last 7 days   Lab Units 04/30/23  0625 04/29/23  0517 04/28/23  0545 04/27/23  0650 04/26/23  0557 04/25/23  0739 04/24/23  0205   SODIUM mmol/L 135* 133* 132* 131* 130* 133* 134*   POTASSIUM mmol/L 4.3 4.1 4.5 4.5 4.6 4.5 4.3   CHLORIDE mmol/L 101 100 99 98 98 99 99   CO2 mmol/L 22.0 21.0* 21.0* 25.0 20.0* 15.0* 23.0   BUN mg/dL 23 26* 30* 26* 33* 34* 24*   CREATININE mg/dL 1.06 1.13 1.28* 1.13 1.17 1.39* 1.17   GLUCOSE mg/dL 183* 161* 171* 163* 184* 305* 160*   CALCIUM mg/dL 8.0* 8.0* 8.1* 8.4* 8.0* 8.5* 9.0   BILIRUBIN mg/dL 1.3* 1.1 1.1  --   --  0.7 0.4   ALK PHOS U/L 192* 201* 201*  --   --  88 85   ALT (SGPT) U/L 346* 480* 652*  --   --  101* 62*   AST (SGOT) U/L 84* 141* 344*  --   --  69* 31     Results from last 7 days   Lab Units 04/30/23  0625 04/29/23  0517 04/28/23  0545  04/24/23  0205   MAGNESIUM mg/dL 2.1 2.3 2.4 2.0     Results from last 7 days   Lab Units 04/30/23  0625 04/29/23  0517 04/28/23  0545 04/27/23  0650 04/26/23  1130 04/24/23  0205   WBC 10*3/mm3 6.73 6.80 7.44 7.18 7.28 6.20   HEMOGLOBIN g/dL 9.3* 9.4* 9.5* 10.1* 10.1* 10.1*   HEMATOCRIT % 29.2* 28.6* 28.9* 31.6* 29.9* 31.0*   PLATELETS 10*3/mm3 77* 60* 48* 77* 92* 143     Lab Results   Component Value Date    CKTOTAL 119 04/23/2023    CKMB 2.16 04/23/2023    TROPONINI <0.012 11/19/2014    TROPONINT 52 (H) 04/25/2023     CO2   Date Value Ref Range Status   04/30/2023 22.0 22.0 - 29.0 mmol/L Final     Results from last 7 days   Lab Units 04/26/23  1130   INR  1.81*     Imaging Results (Most Recent)     Procedure Component Value Units Date/Time    US Venous Doppler Lower Extremity Bilateral (duplex) [[id number]] Collected: 04/28/23 2142     Updated: 04/28/23 2154    Narrative:      EXAMINATION:  Ultrasound bilateral lower extremity venous Doppler.    COMPARISON:  No comparison.    HISTORY:  Positive PE, concern for DVT.    TECHNIQUE:  High-resolution gray scale imaging, color flow Doppler, compression were  performed from the groin to the calf of the bilateral lower extremities.  Augmentation was performed of the bilateral common femoral vein and popliteal  vein.    FINDINGS:  There is DVT within both popliteal veins.  The remaining deep veins appear  patent with normal compressibility.    There is a complex collection in the left popliteal fossa containing shadowing  calcification or two calcifications.      Impression:      1.  Bilateral DVT within the popliteal veins.    2.  Complex cystic area in the left popliteal fossa measuring up to 6.2 cm.  This may represent a complex Baker's cyst with loose bodies or calcifications.    US Renal Bilateral [[id number]] Collected: 04/26/23 0820     Updated: 04/26/23 1208    Narrative:      HISTORY:  Urinary retention.    COMPARISON:  None    TECHNIQUE:  Real-time ultrasound  imaging and color Doppler used to evaluate the kidneys and  bladder.    FINDINGS:  The right kidney measures 13.1 x 5.9 x 4.5 cm. The left kidney measures 13 x 5.8  x 6.5 cm.  Cortical thickness and echotexture are within normal limits. There is  no hydronephrosis or evidence of mass. No calculus was seen.  Lower pole cyst on  the right measuring 4.9 x 4.1 x 4.6 cm.  This appears to be an anechoic  partially exophytic cyst.    The bladder was collapsed around a Colbert catheter.      Impression:      A 5 cm lower pole right renal cyst appears simple.  No acute sonographic  abnormality.      CT Angiogram Chest [[id number]] Collected: 04/26/23 0831     Updated: 04/26/23 1137    Narrative:      EXAM:  CTA chest with contrast, PE protocol.    COMPARISON:  CTA chest PE protocol, 04/24/2023.    HISTORY:  Pulmonary embolism suspected, unknown D-dimer.  Shortness of breath.    TECHNIQUE:  Intravenous contrast was administered and axial images from the thoracic inlet  through the diaphragm were performed followed by 2D multiplanar reformats.  MIPS  were reconstructed and reviewed.    FINDINGS:  Limited by respiratory motion artifact.    Mild bilateral dependent atelectasis.  Lungs are otherwise clear.  No  consolidation.    Trace bilateral pleural effusions.  Esophagus is collapsed and poorly evaluated.  Mild bilateral gynecomastia.  There is some mild lymphadenopathy suspected in  the upper mediastinum, nonspecific.  No lymphadenopathy identified elsewhere in  the chest by size criteria.    Extensive bilateral pulmonary emboli with saddle embolus extending across the  bifurcation of the main pulmonary artery.  There is extensive embolic disease  throughout all lobes of both lungs. The Main  pulmonary artery is enlarged  measuring up to 4.6 cm in diameter.  There is abnormal flattening or even  leftward convexity of the intraventricular septum.    Limited evaluation of the upper abdomen due to motion.  No gross acute  osseous  abnormality.      Impression:      1.  Extensive bilateral pulmonary emboli with saddle embolus extending across  the main pulmonary artery bifurcation.  There is enlargement of the main  pulmonary artery as well as leftward bowing or convexity of the intraventricular  septum.  These findings may be seen in the setting of right heart strain.    2.  The exam is otherwise limited by motion.  There is mild dependent  atelectasis.  Lungs are otherwise grossly clear.    3.  Mild lymphadenopathy in the upper mediastinum, nonspecific.  Consider  reevaluation on short-term follow-up CT chest.        XR Chest 1 View [[id number]] Collected: 04/25/23 0744     Updated: 04/25/23 0902    Narrative:      HISTORY:  Post code.    COMPARISON:  4/23/2023    FINDINGS:  AP view of the chest demonstrates clear lungs and mild cardiomegaly.  There is  no pneumothorax.  Left-sided AICD is unchanged.      Impression:      Mild cardiomegaly.      CT Angiogram Chest [[id number]] Collected: 04/24/23 0511     Updated: 04/24/23 0658    Narrative:      INDICATION:  Positive D-dimer    COMPARISON:  None    TECHNIQUE:  Volumetric CT scan of the chest, from the thoracic inlet to the level of the  diaphragms, with intravenous contrast.  2D axial, sagittal, and coronal  reformats were performed.  MIPS were performed on a separate workstation and  reviewed.    FINDINGS:  Lungs: No focal consolidation or mass.  Pleura: No effusion or pneumothorax.  Vessels: Thoracic aorta is normal in caliber.  Bones: No suspicious lesions.  Visualized upper abdomen: Unremarkable.      Impression:      I suspect there are bilateral pulmonary emboli, but it is difficult to tell with  certainty due to suboptimal contrast timing and significant motion artifact.  Repeat examination is recommended.    XR Chest 1 View [[id number]] Collected: 04/23/23 1630     Updated: 04/23/23 1648    Narrative:      HISTORY:  Shortness of breath.    FINDINGS:  Frontal film of the chest  was obtained.  There is a dual-lead pacing system in  place via the left subclavian venous approach.  The pacing leads appear to be in  good position.  There is mild pulmonary vascular congestion with mild  cardiomegaly.  No infiltrate or effusion is seen.      Impression:      1.  Mild cardiomegaly with mild pulmonary vascular congestion.    2.  No acute infiltrate or effusion is seen.         No results found for: ACANTHNAEG, AFBCX, BPERTUSSISCX, BLOODCX  No results found for: BCIDPCR, CXREFLEX, CSFCX, CULTURETIS  No results found for: CULTURES, HSVCX, URCX  No results found for: EYECULTURE, GCCX, HSVCULTURE, LABHSV  No results found for: LEGIONELLA, MRSACX, MUMPSCX, MYCOPLASCX  No results found for: NOCARDIACX, STOOLCX  No results found for: THROATCX, UNSTIMCULT, URINECX, CULTURE, VZVCULTUR  No results found for: VIRALCULTU, WOUNDCX  Medication Reviewed and Will Continue Unless Documented in Plan:   ascorbic acid, 1,000 mg, Oral, Nightly  cetirizine, 10 mg, Oral, Daily  vitamin D3, 5,000 Units, Oral, Nightly  docusate sodium, 200 mg, Oral, BID  dronedarone, 400 mg, Oral, Daily With Dinner  fondaparinux, 10 mg, Subcutaneous, Q24H  glipizide, 10 mg, Oral, QAM AC  Insulin Aspart, 2-12 Units, Subcutaneous, 4x Daily With Meals & Nightly  magnesium oxide, 400 mg, Oral, BID  metoprolol succinate XL, 25 mg, Oral, BID  senna-docusate sodium, 2 tablet, Oral, BID  sodium bicarbonate, 650 mg, Oral, TID  sodium chloride, 3 mL, Intravenous, Q12H  sodium chloride, 100 mL/hr, Intravenous, Once  tamsulosin, 0.4 mg, Oral, BID  vancomycin, 1,250 mg, Intravenous, Q12H      Pharmacy to dose vancomycin,       •  acetaminophen  •  bisacodyl  •  calcium carbonate  •  fluticasone  •  hydrALAZINE  •  hydrOXYzine pamoate  •  muscle rub  •  ondansetron  •  oxyCODONE-acetaminophen  •  Pharmacy to dose vancomycin  •  simethicone  •  sodium chloride  •  traZODone  Pharmacy to dose vancomycin,        Dietary Orders (From admission, onward)      Start     Ordered    04/27/23 1718  Diet: Diabetic Diets, Cardiac Diets; Healthy Heart (2-3 Na+); Consistent Carbohydrate; Texture: Regular Texture (IDDSI 7); Fluid Consistency: Thin (IDDSI 0)  Diet Effective Now        References:    Diet Order [place]   Question Answer Comment   Diets: Diabetic Diets    Diets: Cardiac Diets    Cardiac Diet: Healthy Heart (2-3 Na+)    Diabetic Diet: Consistent Carbohydrate    Texture: Regular Texture (IDDSI 7)    Fluid Consistency: Thin (IDDSI 0)        04/27/23 1718    04/26/23 1200  Dietary Nutrition Supplements Milk  Daily With Breakfast, Lunch & Dinner      Question:  Select Supplement:  Answer:  Milk    04/26/23 1116              History, physical exam, assessment and plan may have been partly or fully copied from before, but  changes made to the copied record to reflect care on the date of service. Part of the lab and imaging  reports auto populated and corrected. Some of this note may be an electronic transcription of spoken  language to printed text. This may permit erroneous, or at times, nonsensical words or phrases to be  inadvertently transcribed. Although I have reviewed the note for such errors, some may still exist.      This document has been electronically signed by Charlie Persaud MD on April 30, 2023 16:51 CDT

## 2023-05-01 ENCOUNTER — OUTSIDE FACILITY SERVICE (OUTPATIENT)
Dept: PULMONOLOGY | Facility: CLINIC | Age: 63
End: 2023-05-01
Payer: MEDICARE

## 2023-05-01 LAB
ALBUMIN SERPL ELPH-MCNC: 3.2 G/DL (ref 2.9–4.4)
ALBUMIN SERPL-MCNC: 3.3 G/DL (ref 3.5–5.2)
ALBUMIN/GLOB SERPL: 1 {RATIO} (ref 0.7–1.7)
ALBUMIN/GLOB SERPL: 1.3 G/DL
ALP SERPL-CCNC: 204 U/L (ref 39–117)
ALPHA1 GLOB SERPL ELPH-MCNC: 0.3 G/DL (ref 0–0.4)
ALPHA2 GLOB SERPL ELPH-MCNC: 0.8 G/DL (ref 0.4–1)
ALT SERPL W P-5'-P-CCNC: 249 U/L (ref 1–41)
ANION GAP SERPL CALCULATED.3IONS-SCNC: 11 MMOL/L (ref 5–15)
AST SERPL-CCNC: 74 U/L (ref 1–40)
B-GLOBULIN SERPL ELPH-MCNC: 0.9 G/DL (ref 0.7–1.3)
BASOPHILS # BLD AUTO: 0.05 10*3/MM3 (ref 0–0.2)
BASOPHILS NFR BLD AUTO: 0.8 % (ref 0–1.5)
BILIRUB SERPL-MCNC: 0.9 MG/DL (ref 0–1.2)
BUN SERPL-MCNC: 22 MG/DL (ref 8–23)
BUN/CREAT SERPL: 20.4 (ref 7–25)
CALCIUM SPEC-SCNC: 8.1 MG/DL (ref 8.6–10.5)
CHLORIDE SERPL-SCNC: 100 MMOL/L (ref 98–107)
CO2 SERPL-SCNC: 23 MMOL/L (ref 22–29)
CREAT SERPL-MCNC: 1.08 MG/DL (ref 0.76–1.27)
DEPRECATED RDW RBC AUTO: 50 FL (ref 37–54)
EGFRCR SERPLBLD CKD-EPI 2021: 78.1 ML/MIN/1.73
EOSINOPHIL # BLD AUTO: 0.08 10*3/MM3 (ref 0–0.4)
EOSINOPHIL NFR BLD AUTO: 1.2 % (ref 0.3–6.2)
ERYTHROCYTE [DISTWIDTH] IN BLOOD BY AUTOMATED COUNT: 14.9 % (ref 12.3–15.4)
F5 GENE MUT ANL BLD/T: NORMAL
GAMMA GLOB SERPL ELPH-MCNC: 1.1 G/DL (ref 0.4–1.8)
GLOBULIN SER CALC-MCNC: 3.1 G/DL (ref 2.2–3.9)
GLOBULIN UR ELPH-MCNC: 2.6 GM/DL
GLUCOSE BLDC GLUCOMTR-MCNC: 164 MG/DL (ref 70–130)
GLUCOSE BLDC GLUCOMTR-MCNC: 177 MG/DL (ref 70–130)
GLUCOSE BLDC GLUCOMTR-MCNC: 182 MG/DL (ref 70–130)
GLUCOSE BLDC GLUCOMTR-MCNC: 254 MG/DL (ref 70–130)
GLUCOSE BLDC GLUCOMTR-MCNC: 287 MG/DL (ref 70–130)
GLUCOSE SERPL-MCNC: 172 MG/DL (ref 65–99)
HCT VFR BLD AUTO: 26.9 % (ref 37.5–51)
HGB BLD-MCNC: 8.7 G/DL (ref 13–17.7)
IGA SERPL-MCNC: 168 MG/DL (ref 61–437)
IGG SERPL-MCNC: 1017 MG/DL (ref 603–1613)
IGM SERPL-MCNC: 64 MG/DL (ref 20–172)
IMM GRANULOCYTES # BLD AUTO: 0.07 10*3/MM3 (ref 0–0.05)
IMM GRANULOCYTES NFR BLD AUTO: 1.1 % (ref 0–0.5)
KAPPA LC FREE SER-MCNC: 34.5 MG/L (ref 3.3–19.4)
KAPPA LC FREE/LAMBDA FREE SER: 2.04 {RATIO} (ref 0.26–1.65)
LABORATORY COMMENT REPORT: NORMAL
LAMBDA LC FREE SERPL-MCNC: 16.9 MG/L (ref 5.7–26.3)
LYMPHOCYTES # BLD AUTO: 1.35 10*3/MM3 (ref 0.7–3.1)
LYMPHOCYTES NFR BLD AUTO: 21 % (ref 19.6–45.3)
M PROTEIN SERPL ELPH-MCNC: NORMAL G/DL
MAGNESIUM SERPL-MCNC: 2.1 MG/DL (ref 1.6–2.4)
MCH RBC QN AUTO: 29.9 PG (ref 26.6–33)
MCHC RBC AUTO-ENTMCNC: 32.3 G/DL (ref 31.5–35.7)
MCV RBC AUTO: 92.4 FL (ref 79–97)
MONOCYTES # BLD AUTO: 0.81 10*3/MM3 (ref 0.1–0.9)
MONOCYTES NFR BLD AUTO: 12.6 % (ref 5–12)
NEUTROPHILS NFR BLD AUTO: 4.07 10*3/MM3 (ref 1.7–7)
NEUTROPHILS NFR BLD AUTO: 63.3 % (ref 42.7–76)
NRBC BLD AUTO-RTO: 0.5 /100 WBC (ref 0–0.2)
PLATELET # BLD AUTO: 97 10*3/MM3 (ref 140–450)
PMV BLD AUTO: 11.5 FL (ref 6–12)
POTASSIUM SERPL-SCNC: 4.5 MMOL/L (ref 3.5–5.2)
PROT PATTERN SERPL ELPH-IMP: NORMAL
PROT PATTERN SERPL IFE-IMP: NORMAL
PROT SERPL-MCNC: 5.9 G/DL (ref 6–8.5)
PROT SERPL-MCNC: 6.3 G/DL (ref 6–8.5)
RBC # BLD AUTO: 2.91 10*6/MM3 (ref 4.14–5.8)
SODIUM SERPL-SCNC: 134 MMOL/L (ref 136–145)
WBC NRBC COR # BLD: 6.43 10*3/MM3 (ref 3.4–10.8)

## 2023-05-01 PROCEDURE — 25010000002 VANCOMYCIN 10 G RECONSTITUTED SOLUTION: Performed by: INTERNAL MEDICINE

## 2023-05-01 PROCEDURE — 25010000002 FONDAPARINUX PER 0.5 MG: Performed by: INTERNAL MEDICINE

## 2023-05-01 PROCEDURE — 83735 ASSAY OF MAGNESIUM: CPT | Performed by: INTERNAL MEDICINE

## 2023-05-01 PROCEDURE — 80053 COMPREHEN METABOLIC PANEL: CPT | Performed by: INTERNAL MEDICINE

## 2023-05-01 PROCEDURE — 97530 THERAPEUTIC ACTIVITIES: CPT

## 2023-05-01 PROCEDURE — 99231 SBSQ HOSP IP/OBS SF/LOW 25: CPT | Performed by: INTERNAL MEDICINE

## 2023-05-01 PROCEDURE — 82948 REAGENT STRIP/BLOOD GLUCOSE: CPT

## 2023-05-01 PROCEDURE — 85025 COMPLETE CBC W/AUTO DIFF WBC: CPT | Performed by: INTERNAL MEDICINE

## 2023-05-01 NOTE — PROGRESS NOTES
Pharmacokinetics by Pharmacy - Vancomycin    Emre Lisa is a 63 y.o. male receiving vancomycin 1250mg IV Q12H day 18 for bone and/or joint infection    Objective:  [Ht: 182.9 cm; Wt: (!) 170 kg (375 lb 11.2 oz)]     WBC   Date Value Ref Range Status   05/01/2023 6.43 3.40 - 10.80 10*3/mm3 Final   04/30/2023 6.73 3.40 - 10.80 10*3/mm3 Final   04/29/2023 6.80 3.40 - 10.80 10*3/mm3 Final    No results found for: CRP, LACTATE   Temp Readings from Last 1 Encounters:   04/21/23 98.6 °F (37 °C)     Estimated Creatinine Clearance: 116.8 mL/min (by C-G formula based on SCr of 1.08 mg/dL).   Creatinine   Date Value Ref Range Status   05/01/2023 1.08 0.76 - 1.27 mg/dL Final   04/30/2023 1.06 0.76 - 1.27 mg/dL Final   04/29/2023 1.13 0.76 - 1.27 mg/dL Final       Vancomycin Trough   Date Value Ref Range Status   04/28/2023 15.20 5.00 - 20.00 mcg/mL Final       Culture Results:  Microbiology Results (last 10 days)     ** No results found for the last 240 hours. **        No results found for: RESPCX    Assessment:    WBC 6.43, WNL   Scr 1.08, WNL   Vancomycin levels within range on 4/28/23.  Vancomycin trough and AUC goals 10-20 and 400-600, respectively.     Plan:  1. Continue vancomycin 1250mg IV Q12H. Consult ends 5/26/23.  2. Will order levels when clinically indicated  3. Pharmacy will monitor renal function and adjust dose accordingly.      Eloy Mendoza RPH   05/01/23 10:05 CDT

## 2023-05-01 NOTE — PROGRESS NOTES
Subjective     Emre Rubio Lisa in follow-up.  Overall stable.  Shortness of breath stable.  No bleeding.  Bilateral leg swelling stable.    Past Medical History, Past Surgical History, Social History, Family History have been reviewed and are without significant changes except as mentioned.    ROS:   No CP.   No cough.   Leg swelling +   No fever or chills.   No rash.   No depression or anxiety.   No nausea/emesis. No abdominal pain.       Medications:  The current medication list was reviewed in the EMR    ALLERGIES:    Allergies   Allergen Reactions   • Januvia [Sitagliptin] Hives   • Lipitor [Atorvastatin] Other (See Comments)     Muscle Cramps...   • Shellfish Allergy Other (See Comments)   • Sulfa Antibiotics Rash       Objective      Vitals:    04/30/23 0805 04/30/23 1602 05/01/23 0404 05/01/23 0749   Pulse: 105 98 96 97   Weight:              View : No data to display.                Physical Exam  Vitals and nursing note reviewed.   Constitutional:       Appearance: He is obese.   Cardiovascular:      Rate and Rhythm: Normal rate and regular rhythm.      Heart sounds: No murmur heard.  Pulmonary:      Effort: Pulmonary effort is normal. No respiratory distress.      Breath sounds: Normal breath sounds.   Musculoskeletal:      Right lower leg: Edema present.      Left lower leg: Edema present.   Skin:     General: Skin is dry.      Coloration: Skin is pale.      Findings: Bruising present.   Neurological:      Mental Status: He is alert.   Psychiatric:         Mood and Affect: Mood normal.         Behavior: Behavior normal.         Thought Content: Thought content normal.               RECENT LABS:Independently reviewed and summarized  Hematology WBC   Date Value Ref Range Status   05/01/2023 6.43 3.40 - 10.80 10*3/mm3 Final     RBC   Date Value Ref Range Status   05/01/2023 2.91 (L) 4.14 - 5.80 10*6/mm3 Final     Hemoglobin   Date Value Ref Range Status   05/01/2023 8.7 (L) 13.0 - 17.7 g/dL Final      Hematocrit   Date Value Ref Range Status   05/01/2023 26.9 (L) 37.5 - 51.0 % Final     Platelets   Date Value Ref Range Status   05/01/2023 97 (L) 140 - 450 10*3/mm3 Final       Lab Results   Component Value Date    GLUCOSE 172 (H) 05/01/2023    BUN 22 05/01/2023    CREATININE 1.08 05/01/2023    EGFR 78.1 05/01/2023    BCR 20.4 05/01/2023    K 4.5 05/01/2023    CO2 23.0 05/01/2023    CALCIUM 8.1 (L) 05/01/2023    ALBUMIN 3.3 (L) 05/01/2023    BILITOT 0.9 05/01/2023    AST 74 (H) 05/01/2023     (H) 05/01/2023         Heparin Induced Plt Ab  0.000 - 0.400 OD 2.174 High     Resulting Agency [facility]            Assessment & Plan         (1) Acute bilateral PE with saddle embolus  (2) Acute bilateral DVT (3) Heparin induce thrombocytopenia       PE and bilateral DVT likely from heparin-induced thrombocytopenia.  HIT SHAINA was markedly abnormal at 2.17.  Serotonin release assay is pending.  He has been switched to fondaparinux.  His platelet count is improving.  97,000 today.  No bleeding.  Switch to Eliquis or Xarelto on discharge.  Given extensive blood clot in his overall immobility he will at least likely need 6 months of anticoagulation.  He will also need outpatient hematology follow-up.  Avoid all heparin products     5/1/2023      CC:

## 2023-05-01 NOTE — THERAPY EVALUATION
Alvin Matamoros DO,[facility]  Gastroenterology  Hepatology  Endoscopy  Board Certified in Internal Medicine and gastroenterology  [address], [address]  [place]. [address]  - [phone]   F - [phone]     GASTROENTEROLOGY HISTORY AND PHYSICAL  NOTE   ALVIN MATAMOROS DO.         SUBJECTIVE:   5/1/2023    Name: Emre Lisa  DOD: 1960    REASON FOR CONSULT: Elevations of liver enzyme test  Chief Complaint:     Subjective : Elevations of enzymes that are associated with acute pulmonary embolism    Patient is 63 y.o. male, normal liver enzyme test prior to admission to the hospital, presents with elevations of liver enzyme test.  These are associated with acute pulmonary embolism.  They have been declining since adequate treatment of the saddle embolism that has been noted.  The patient had right-sided strain on the echocardiogram with right atrial cavity that is borderline dilated.  There was an estimated right ventricular systolic pressure from tricuspid regurgitation that is normal.  There is a trivial amount of pericardial effusion.    The patient's liver enzyme test have been declining.  Patient said that his swelling of his feet and legs have been also been declining.   ROS/HISTORY/ CURRENT MEDICATIONS/OBJECTIVE/VS/PE:   Review of Systems:  All systems unremarkable unless specified below.  Constitutional   HENT  Eyes   Respiratory    Cardiovascular  Gastrointestinal   Endocrine  Genitourinary    Musculoskeletal   Skin  Allergic/Immunologic    Neurological    Hematological  Psychiatric/Behavioral    History:     Past Medical History:   Diagnosis Date   • Arthritis    • Atrial fibrillation    • Diabetes mellitus    • Diabetic foot ulcer associated with type 2 diabetes mellitus     left great toe   • Hallux malleus of left foot    • Hammer toe    • Hypertension    • MI (myocardial infarction)    • Neuropathy in diabetes    • Onychomycosis    • Presence of biventricular cardiac  pacemaker    • Rheumatoid arthritis      Past Surgical History:   Procedure Laterality Date   • AMPUTATION DIGIT Right 3/5/2017    Procedure: RIGHT AMPUTATION TRANSMETATRASAL , RECESSION GASTROCNEMOUS;  Surgeon: Marco A Thompson DPM;  Location: [facility];  Service:    • CARDIAC DEFIBRILLATOR PLACEMENT  2010, 2016   • CARPAL TUNNEL RELEASE  2015    elbow and wrist left arm   • FOOT IRRIGATION, DEBRIDEMENT AND REPAIR Left 3/7/2018    Procedure: FOOT IRRIGATION, DEBRIDEMENT AND REPAIR;  Surgeon: Marco A Thompson DPM;  Location: [facility] OR;  Service:    • FOOT IRRIGATION, DEBRIDEMENT AND REPAIR Left 3/10/2018    Procedure: FOOT IRRIGATION, DEBRIDEMENT AND REPAIR;  Surgeon: Marco A Thompson DPM;  Location: [facility] OR;  Service: Podiatry   • FOOT WOUND CLOSURE Right 3/2/2017    Procedure: INCISION AND DRAINAGE, RIGHT FOOT;  Surgeon: Tung Rosenthal DPM;  Location: [facility] OR;  Service:    • HAMMER TOE REPAIR Left 2/15/2018    Procedure: HAMMERTOE CORRECTION LEFT SECOND, THIRD AND FOURTH TOES AND HALLUX INTERPHALANGEAL JOINT ARTHRODESIS LEFT FOOT (Micro Asnis, 4.0 & 5.0 Asnis)      (c-arm);  Surgeon: Marco A Thompson DPM;  Location: [facility] OR;  Service:    • HARDWARE REMOVAL Left 3/5/2018    Procedure: ANKLE/FOOT HARDWARE REMOVAL;  Surgeon: Marco A Thompson DPM;  Location: [facility] OR;  Service:    • INCISION AND DRAINAGE LEG Left 3/5/2018    Procedure: INCISION AND DRAINAGE LOWER EXTREMITY;  Surgeon: Marco A Thompson DPM;  Location: [facility] OR;  Service:    • PLANTAR FASCIA RELEASE Left 11/21/2019    Procedure: PLANTAR PLANING LEFT FOOT AND ALL OTHER INDICATED PROCEDURES;  Surgeon: Marco A Thompson DPM;  Location: [facility];  Service: Podiatry   • TOE AMPUTATION Right 2016   • TONSILECTOMY, ADENOIDECTOMY, BILATERAL MYRINGOTOMY AND TUBES      age 25     Family History   Problem Relation Age of Onset   • Diabetes Father    • Stroke Father    • No Known Problems Maternal Grandmother    • No Known Problems Maternal Grandfather    • No  Known Problems Paternal Grandmother    • No Known Problems Paternal Grandfather    • No Known Problems Daughter    • No Known Problems Daughter    • No Known Problems Son    • Heart disease Other    • Hypertension Other      Social History     Tobacco Use   • Smoking status: Never   • Smokeless tobacco: Never   Vaping Use   • Vaping Use: Never used   Substance Use Topics   • Alcohol use: Yes     Comment: social   • Drug use: No     Prior to Admission medications    Medication Sig Start Date End Date Taking? Authorizing Provider   ascorbic acid (VITAMIN C) 1000 MG tablet Take 1 tablet by mouth Every Night. 7/19/21   Herve Cr MD   Bacillus Coagulans-Inulin (Probiotic) 1-250 BILLION-MG capsule Take 1 capsule by mouth 2 (Two) Times a Day. 1/27/23   Lito Rico APRN   Blood Glucose Monitoring Suppl (ONE TOUCH ULTRA 2) w/Device kit  10/18/21   Herve Cr MD   Cholecalciferol 125 MCG (5000 UT) tablet Take 1 tablet by mouth Every Night. 7/19/21   Herve Cr MD   clindamycin (CLEOCIN) 300 MG capsule Take 1 capsule by mouth 3 (Three) Times a Day. 4/5/23   Lito Rico APRN   dronedarone (MULTAQ) 400 MG tablet Take 1 tablet by mouth Every Night.    Herve Cr MD   fexofenadine (ALLEGRA) 180 MG tablet Take 1 tablet by mouth Every Night. 7/19/21   Herve Cr MD   fluticasone (FLONASE) 50 MCG/ACT nasal spray 2 sprays into the nostril(s) as directed by provider As Needed for Rhinitis.    Herve Cr MD   furosemide (Lasix) 20 MG tablet Take 1 tablet by mouth Daily. 4/11/23   Lito Rico APRN   glimepiride (AMARYL) 4 MG tablet Take 1 tablet by mouth Every Night. 7/19/21   Herve Cr MD   losartan (COZAAR) 25 MG tablet Take 1 tablet by mouth Daily. 7/19/21   Herve Cr MD   magnesium oxide (MAG-OX) 400 MG tablet Take 1 tablet by mouth 2 (Two) Times a Day. 11/25/21   Herve Cr MD   metFORMIN (GLUCOPHAGE) 1000 MG  tablet Take 1 tablet by mouth 2 (Two) Times a Day With Meals. 7/19/21   Herve Cr MD   metoprolol succinate XL (TOPROL-XL) 25 MG 24 hr tablet Take 1 tablet by mouth 2 (Two) Times a Day. 0.5 tablet    Herve Cr MD   multivitamin (THERAGRAN) tablet tablet Take  by mouth Every Night.    Herve Cr MD   ONE TOUCH ULTRA TEST test strip USE TO TEST BLOOD SUGAR THREE TIMES A DAY 8/20/16   Herve Cr MD   vancomycin 1750 mg/500 mL 0.9% NS IVPB ([facility]) Infuse 500 mL into a venous catheter Every 12 (Twelve) Hours for 70 doses. Indications: Bone and/or Joint Infection 4/21/23 5/26/23  Marco A Thompson DPM   Zinc 50 MG tablet Take 1 tablet by mouth Every Night.    Herve Cr MD     Allergies:  Heparin, Januvia [sitagliptin], Lipitor [atorvastatin], Shellfish allergy, and Sulfa antibiotics    I have reviewed the patients medical history, surgical history and family history in the available medical record system.     Current Medications:     Current Facility-Administered Medications   Medication Dose Route Frequency Provider Last Rate Last Admin   • acetaminophen (TYLENOL) tablet 650 mg  650 mg Oral Q6H PRN Charlie Persaud MD       • ascorbic acid (VITAMIN C) tablet 1,000 mg  1,000 mg Oral Nightly Charlie Persaud MD       • bisacodyl (DULCOLAX) suppository 5 mg  5 mg Rectal Daily PRN Charlie Persaud MD       • calcium carbonate (TUMS) chewable tablet 500 mg (200 mg elemental)  2 tablet Oral TID PRN Charlie Persaud MD       • cetirizine (zyrTEC) tablet 10 mg  10 mg Oral Daily Charlie Persaud MD       • cholecalciferol (VITAMIN D3) tablet 5,000 Units  5,000 Units Oral Nightly Charlie Persaud MD       • docusate sodium (COLACE) capsule 200 mg  200 mg Oral BID Charlie Persaud MD       • dronedarone (MULTAQ) tablet 400 mg  400 mg Oral Daily With Dinner Charlie Persaud MD       • fluticasone (FLONASE) 50  MCG/ACT nasal spray 2 spray  2 spray Nasal Daily PRN Charlie Persaud MD       • fondaparinux (ARIXTRA) injection 10 mg  10 mg Subcutaneous Q24H Laila Fuller MD       • glipizide (GLUCOTROL) tablet 10 mg  10 mg Oral QAM AC Charlie Persaud MD       • hydrALAZINE (APRESOLINE) injection 10 mg  10 mg Intravenous Q4H PRN Charlie Persaud MD       • hydrOXYzine pamoate (VISTARIL) capsule 25 mg  25 mg Oral TID PRN Bindu Rodriguez APRN       • Insulin Aspart (novoLOG) injection 2-12 Units  2-12 Units Subcutaneous 4x Daily With Meals & Nightly Charlie Persaud MD       • magnesium oxide (MAG-OX) tablet 400 mg  400 mg Oral BID Charlie Persaud MD       • metoprolol succinate XL (TOPROL-XL) 24 hr tablet 25 mg  25 mg Oral BID Charlie Persaud MD       • muscle rub (BenGay) 10-15 % cream 1 application  1 application Topical Q1H PRN Charlie Persaud MD       • ondansetron (ZOFRAN) injection 4 mg  4 mg Intravenous Q4H PRN Charlie Persaud MD       • oxyCODONE-acetaminophen (PERCOCET) 7.5-325 MG per tablet 1 tablet  1 tablet Oral Q4H PRN Charlie Persaud MD       • Pharmacy to dose vancomycin   Does not apply Continuous PRN Charlie Persaud MD       • sennosides-docusate (PERICOLACE) 8.6-50 MG per tablet 2 tablet  2 tablet Oral BID Charlie Persaud MD       • simethicone (MYLICON) chewable tablet 80 mg  80 mg Oral Q4H PRN Bindu Rodriguez, WALTER       • sodium bicarbonate tablet 650 mg  650 mg Oral TID Dylan Perez APRN       • sodium chloride 0.9 % flush 3 mL  3 mL Intravenous Q12H Bindu Rodriguez, WALTER       • sodium chloride 0.9 % flush 3 mL  3 mL Intravenous PRN Bindu Rodriguez APRN       • sodium chloride 0.9 % infusion  100 mL/hr Intravenous Once Gamaliel Hardin MD       • tamsulosin (FLOMAX) 24 hr capsule 0.4 mg  0.4 mg Oral BID Charlie Persaud MD       • traZODone (DESYREL) tablet 50 mg  50 mg Oral  Nightly PRN Charlie Persaud MD       • vancomycin 1250 mg/250 mL 0.9% NS IVPB ([facility])  1,250 mg Intravenous Q12H Charlie Persaud MD           Objective     Physical Exam:   Heart Rate:  [96-97] 97  Blood pressure: 130/80  Respiratory rate: 20.  On 2 L per nasal cannula    Physical Exam:  General Appearance:    Alert, cooperative, in no acute distress   Head:    Normocephalic, without obvious abnormality, atraumatic   Eyes:            Lids and lashes normal, conjunctivae and sclerae normal, no icterus, no pallor, corneas clear, PERRLA   Ears:    Ears appear intact with no abnormalities noted   Throat:   No oral lesions, no thrush, oral mucosa moist   Neck:   No adenopathy, supple, trachea midline, no thyromegaly, no  carotid bruit, no JVD   Back:     No kyphosis present, no scoliosis present, no skin lesions,   erythema or scars, no tenderness to percussion or                 palpation,  range of motion normal   Lungs:     Clear to auscultation,respirations regular, even and         unlabored    Heart:    Regular rhythm and normal rate, normal S1 and S2, no  murmur, no gallop, no rub, no click   Breast Exam:    Deferred   Abdomen:     Normal bowel sounds, no masses, no organomegaly, soft  nontender, nondistended, no guarding, no rebound                 tenderness   Genitalia:    Deferred   Extremities:   Moves all extremities well, no edema, no cyanosis, no          redness   Pulses:   Pulses palpable and equal bilaterally   Skin:   No bleeding, bruising or rash   Lymph nodes:   No palpable adenopathy   Neurologic:   Cranial nerves 2 - 12 grossly intact, sensation intact, DTR     present and equal bilaterally      Results Review:     Lab Results   Component Value Date    WBC 6.43 05/01/2023    WBC 6.73 04/30/2023    WBC 6.80 04/29/2023    HGB 8.7 (L) 05/01/2023    HGB 9.3 (L) 04/30/2023    HGB 9.4 (L) 04/29/2023    HCT 26.9 (L) 05/01/2023    HCT 29.2 (L) 04/30/2023    HCT 28.6 (L) 04/29/2023    PLT 97  (L) 05/01/2023    PLT 77 (L) 04/30/2023    PLT 60 (L) 04/29/2023     Results from last 7 days   Lab Units 05/01/23  0540 04/30/23  0625 04/29/23  0517   ALK PHOS U/L 204* 192* 201*   ALT (SGPT) U/L 249* 346* 480*   AST (SGOT) U/L 74* 84* 141*     Results from last 7 days   Lab Units 05/01/23  0540 04/30/23  0625 04/29/23  0517   BILIRUBIN mg/dL 0.9 1.3* 1.1   ALK PHOS U/L 204* 192* 201*     No results found for: LIPASE  Lab Results   Component Value Date    INR 1.81 (H) 04/26/2023    INR 1.2 07/13/2016     No results found for: THROATCX    Radiology Review:  Imaging Results (Last 72 Hours)     Procedure Component Value Units Date/Time    US Venous Doppler Lower Extremity Bilateral (duplex) [[id number]] Collected: 04/28/23 2142     Updated: 04/28/23 2154    Narrative:      EXAMINATION:  Ultrasound bilateral lower extremity venous Doppler.    COMPARISON:  No comparison.    HISTORY:  Positive PE, concern for DVT.    TECHNIQUE:  High-resolution gray scale imaging, color flow Doppler, compression were  performed from the groin to the calf of the bilateral lower extremities.  Augmentation was performed of the bilateral common femoral vein and popliteal  vein.    FINDINGS:  There is DVT within both popliteal veins.  The remaining deep veins appear  patent with normal compressibility.    There is a complex collection in the left popliteal fossa containing shadowing  calcification or two calcifications.      Impression:      1.  Bilateral DVT within the popliteal veins.    2.  Complex cystic area in the left popliteal fossa measuring up to 6.2 cm.  This may represent a complex Baker's cyst with loose bodies or calcifications.           I reviewed the patient's new clinical results.  I reviewed the patient's new imaging results and agree with the interpretation.     ASSESSMENT/PLAN:   ASSESSMENT:  1.  Suspect elevations of liver enzyme tests secondary to acute pulmonary embolism, improving with associated right atrial cavity  that is borderline dilated.  Superimposed hepatic steatosis    PLAN:  1.  Observation now.  No plans for any intervention.     Tung Dodd,   05/01/23  18:25 CDT

## 2023-05-01 NOTE — ACP (ADVANCE CARE PLANNING)
[facility] @ [facility]  INPATIENT PROGRESS NOTE    PATIENT NAME: Emre Lisa      PHYSICIAN: Charlie Persaud MD  : 1960        MRN: [id number]  Patient Care Team:  Jamaal Bravo MD as PCP - Marco A Chauhan DPM as Consulting Physician (Podiatry)    Chief Complaint:  Continued antibiotic therapy and medical management  History of Present Illness: Patient is a 62 y/o male with a pmh of type 2 DM, hypertension, CAD, and neuropathy.  He recently underwent surgical repair of Charcot deformity with external fixator stabilization to his left foot related to cellulitis.  He tolerated the surgical procedure well.  He will need IV antibiotic therapy through 23 due to bone/joint infection.  He remains non-weight bearing of left lower extremity.  He tells me the pain to his left foot remains controlled, however, he does have a torn left rotator cuff and arthritis so his hip and shoulder are the source of his pain.  He tells me he is doing well and he has no other complaints or concerns.  I have reviewed the patients most recent labs and diagnostics independently.  Glucose 124, BUN 15, creatinine 0.89, sodium 138, potassium 4.2, chloride 105, co2 24, wbc 5.25, hgb 9.9, hct 30.1, platelets 156.       Assessment      Cellulitis of left foot  Charcot's joint of left foot  Type 2 DM  Arthritis  A-fib  Hypertension  Rheumatoid arthritis  DVT & GI prophylaxis     DVT Prophylaxis: Heparin  GI Prophylaxis: Protonix  Code Status: Full    Plan     -Stable at time of exam  -Improvement in shortness of breath  -LFT's and platelets continue to slowly improve.  -Continue fondaparinux.  -Continue with oxygen supplementation, wean as tolerated.  -Continue therapy we will try to get patient out of bed to chair as tolerated.  -All specialists and consultants follow-up appreciated.  -Patient remains clinically stable and improved.  -Blistered area noted to left  anterior lower leg.  -DVT and GI prophylaxis in place.  -We'll continue monitoring patient in hospital setting and treat patient as course dictates.  -Please review orders for detailed plan of care.  -For Acute and Chronic medical condition we will continue medications described below in medication section which have been reviewed and we will continue unless changed in plan of care.  -Laboratory and diagnostic studies have been independently reviewed and the reports are reviewed as documented below.      Subjective   Interval History:   Patient Complaints:   Patient seen and examined.  Tells me he feels better than last week.  No overnight events noted.   History taken from: Patient, patient's chart.    Review of Systems:    Review of Systems   Constitutional: Positive for activity change. Negative for chills and fever.   HENT: Negative for postnasal drip and tinnitus.    Respiratory: Positive for chest tightness and shortness of breath. Negative for apnea.    Cardiovascular: Negative for chest pain and palpitations.   Gastrointestinal: Negative for abdominal distention, nausea and vomiting.   Musculoskeletal: Positive for arthralgias and myalgias.   Skin: Positive for wound.   Neurological: Negative for tremors and seizures.   Psychiatric/Behavioral: Negative for agitation, confusion and hallucinations.       Objective   Vital Signs:  Temp: 98.7 F         Heart Rate: 99         Resp: 24          Blood Pressure: 101/66         Pulse Ox: 96%    Physical Exam:   Physical Exam  Vitals reviewed.   Constitutional:       Appearance: Normal appearance. He is not ill-appearing.   HENT:      Head: Normocephalic and atraumatic.      Mouth/Throat:      Mouth: Mucous membranes are moist.      Pharynx: Oropharynx is clear.   Eyes:      Extraocular Movements: Extraocular movements intact.      Pupils: Pupils are equal, round, and reactive to light.   Neck:      Vascular: No carotid bruit.   Cardiovascular:      Rate and Rhythm:  Regular rhythm. Tachycardia present.      Pulses: Normal pulses.      Heart sounds: Normal heart sounds. No murmur heard.    No gallop.   Pulmonary:      Effort: Pulmonary effort is normal. No respiratory distress.      Breath sounds: Normal breath sounds. No rales.   Abdominal:      General: Bowel sounds are normal. There is no distension.      Palpations: Abdomen is soft.      Tenderness: There is no guarding.   Musculoskeletal:         General: Normal range of motion.      Cervical back: Normal range of motion and neck supple.      Right lower leg: Edema present.      Left lower leg: Edema present.   Skin:     General: Skin is warm and dry.      Capillary Refill: Capillary refill takes less than 2 seconds.      Findings: Bruising present.      Comments: S/P L surgical repair Charcot foot with external fixator.  Wound vac in place   Neurological:      General: No focal deficit present.      Mental Status: He is alert and oriented to person, place, and time.   Psychiatric:         Mood and Affect: Mood normal.         Behavior: Behavior normal.         Thought Content: Thought content normal.         Judgment: Judgment normal.       Results Review:       Results from last 7 days   Lab Units 05/01/23  0540 04/30/23  0625 04/29/23  0517 04/28/23  0545 04/27/23  0650 04/26/23  0557 04/25/23  0739   SODIUM mmol/L 134* 135* 133* 132* 131* 130* 133*   POTASSIUM mmol/L 4.5 4.3 4.1 4.5 4.5 4.6 4.5   CHLORIDE mmol/L 100 101 100 99 98 98 99   CO2 mmol/L 23.0 22.0 21.0* 21.0* 25.0 20.0* 15.0*   BUN mg/dL 22 23 26* 30* 26* 33* 34*   CREATININE mg/dL 1.08 1.06 1.13 1.28* 1.13 1.17 1.39*   GLUCOSE mg/dL 172* 183* 161* 171* 163* 184* 305*   CALCIUM mg/dL 8.1* 8.0* 8.0* 8.1* 8.4* 8.0* 8.5*   BILIRUBIN mg/dL 0.9 1.3* 1.1 1.1  --   --  0.7   ALK PHOS U/L 204* 192* 201* 201*  --   --  88   ALT (SGPT) U/L 249* 346* 480* 652*  --   --  101*   AST (SGOT) U/L 74* 84* 141* 344*  --   --  69*     Results from last 7 days   Lab Units  05/01/23  0540 04/30/23  0625 04/29/23  0517 04/28/23  0545   MAGNESIUM mg/dL 2.1 2.1 2.3 2.4     Results from last 7 days   Lab Units 05/01/23  0540 04/30/23  0625 04/29/23  0517 04/28/23  0545 04/27/23  0650 04/26/23  1130   WBC 10*3/mm3 6.43 6.73 6.80 7.44 7.18 7.28   HEMOGLOBIN g/dL 8.7* 9.3* 9.4* 9.5* 10.1* 10.1*   HEMATOCRIT % 26.9* 29.2* 28.6* 28.9* 31.6* 29.9*   PLATELETS 10*3/mm3 97* 77* 60* 48* 77* 92*     Lab Results   Component Value Date    CKTOTAL 119 04/23/2023    CKMB 2.16 04/23/2023    TROPONINI <0.012 11/19/2014    TROPONINT 52 (H) 04/25/2023     CO2   Date Value Ref Range Status   05/01/2023 23.0 22.0 - 29.0 mmol/L Final     Results from last 7 days   Lab Units 04/26/23  1130   INR  1.81*     Imaging Results (Most Recent)     Procedure Component Value Units Date/Time    US Venous Doppler Lower Extremity Bilateral (duplex) [[id number]] Collected: 04/28/23 2142     Updated: 04/28/23 2154    Narrative:      EXAMINATION:  Ultrasound bilateral lower extremity venous Doppler.    COMPARISON:  No comparison.    HISTORY:  Positive PE, concern for DVT.    TECHNIQUE:  High-resolution gray scale imaging, color flow Doppler, compression were  performed from the groin to the calf of the bilateral lower extremities.  Augmentation was performed of the bilateral common femoral vein and popliteal  vein.    FINDINGS:  There is DVT within both popliteal veins.  The remaining deep veins appear  patent with normal compressibility.    There is a complex collection in the left popliteal fossa containing shadowing  calcification or two calcifications.      Impression:      1.  Bilateral DVT within the popliteal veins.    2.  Complex cystic area in the left popliteal fossa measuring up to 6.2 cm.  This may represent a complex Baker's cyst with loose bodies or calcifications.    US Renal Bilateral [[id number]] Collected: 04/26/23 0820     Updated: 04/26/23 1208    Narrative:      HISTORY:  Urinary  retention.    COMPARISON:  None    TECHNIQUE:  Real-time ultrasound imaging and color Doppler used to evaluate the kidneys and  bladder.    FINDINGS:  The right kidney measures 13.1 x 5.9 x 4.5 cm. The left kidney measures 13 x 5.8  x 6.5 cm.  Cortical thickness and echotexture are within normal limits. There is  no hydronephrosis or evidence of mass. No calculus was seen.  Lower pole cyst on  the right measuring 4.9 x 4.1 x 4.6 cm.  This appears to be an anechoic  partially exophytic cyst.    The bladder was collapsed around a Colbert catheter.      Impression:      A 5 cm lower pole right renal cyst appears simple.  No acute sonographic  abnormality.      CT Angiogram Chest [[id number]] Collected: 04/26/23 0831     Updated: 04/26/23 1137    Narrative:      EXAM:  CTA chest with contrast, PE protocol.    COMPARISON:  CTA chest PE protocol, 04/24/2023.    HISTORY:  Pulmonary embolism suspected, unknown D-dimer.  Shortness of breath.    TECHNIQUE:  Intravenous contrast was administered and axial images from the thoracic inlet  through the diaphragm were performed followed by 2D multiplanar reformats.  MIPS  were reconstructed and reviewed.    FINDINGS:  Limited by respiratory motion artifact.    Mild bilateral dependent atelectasis.  Lungs are otherwise clear.  No  consolidation.    Trace bilateral pleural effusions.  Esophagus is collapsed and poorly evaluated.  Mild bilateral gynecomastia.  There is some mild lymphadenopathy suspected in  the upper mediastinum, nonspecific.  No lymphadenopathy identified elsewhere in  the chest by size criteria.    Extensive bilateral pulmonary emboli with saddle embolus extending across the  bifurcation of the main pulmonary artery.  There is extensive embolic disease  throughout all lobes of both lungs. The Main  pulmonary artery is enlarged  measuring up to 4.6 cm in diameter.  There is abnormal flattening or even  leftward convexity of the intraventricular septum.    Limited  evaluation of the upper abdomen due to motion.  No gross acute osseous  abnormality.      Impression:      1.  Extensive bilateral pulmonary emboli with saddle embolus extending across  the main pulmonary artery bifurcation.  There is enlargement of the main  pulmonary artery as well as leftward bowing or convexity of the intraventricular  septum.  These findings may be seen in the setting of right heart strain.    2.  The exam is otherwise limited by motion.  There is mild dependent  atelectasis.  Lungs are otherwise grossly clear.    3.  Mild lymphadenopathy in the upper mediastinum, nonspecific.  Consider  reevaluation on short-term follow-up CT chest.        XR Chest 1 View [[id number]] Collected: 04/25/23 0744     Updated: 04/25/23 0902    Narrative:      HISTORY:  Post code.    COMPARISON:  4/23/2023    FINDINGS:  AP view of the chest demonstrates clear lungs and mild cardiomegaly.  There is  no pneumothorax.  Left-sided AICD is unchanged.      Impression:      Mild cardiomegaly.      CT Angiogram Chest [[id number]] Collected: 04/24/23 0511     Updated: 04/24/23 0658    Narrative:      INDICATION:  Positive D-dimer    COMPARISON:  None    TECHNIQUE:  Volumetric CT scan of the chest, from the thoracic inlet to the level of the  diaphragms, with intravenous contrast.  2D axial, sagittal, and coronal  reformats were performed.  MIPS were performed on a separate workstation and  reviewed.    FINDINGS:  Lungs: No focal consolidation or mass.  Pleura: No effusion or pneumothorax.  Vessels: Thoracic aorta is normal in caliber.  Bones: No suspicious lesions.  Visualized upper abdomen: Unremarkable.      Impression:      I suspect there are bilateral pulmonary emboli, but it is difficult to tell with  certainty due to suboptimal contrast timing and significant motion artifact.  Repeat examination is recommended.    XR Chest 1 View [[id number]] Collected: 04/23/23 1630     Updated: 04/23/23 1648    Narrative:       HISTORY:  Shortness of breath.    FINDINGS:  Frontal film of the chest was obtained.  There is a dual-lead pacing system in  place via the left subclavian venous approach.  The pacing leads appear to be in  good position.  There is mild pulmonary vascular congestion with mild  cardiomegaly.  No infiltrate or effusion is seen.      Impression:      1.  Mild cardiomegaly with mild pulmonary vascular congestion.    2.  No acute infiltrate or effusion is seen.         No results found for: ACANTHNAEG, AFBCX, BPERTUSSISCX, BLOODCX  No results found for: BCIDPCR, CXREFLEX, CSFCX, CULTURETIS  No results found for: CULTURES, HSVCX, URCX  No results found for: EYECULTURE, GCCX, HSVCULTURE, LABHSV  No results found for: LEGIONELLA, MRSACX, MUMPSCX, MYCOPLASCX  No results found for: NOCARDIACX, STOOLCX  No results found for: THROATCX, UNSTIMCULT, URINECX, CULTURE, VZVCULTUR  No results found for: VIRALCULTU, WOUNDCX  Medication Reviewed and Will Continue Unless Documented in Plan:   ascorbic acid, 1,000 mg, Oral, Nightly  cetirizine, 10 mg, Oral, Daily  vitamin D3, 5,000 Units, Oral, Nightly  docusate sodium, 200 mg, Oral, BID  dronedarone, 400 mg, Oral, Daily With Dinner  fondaparinux, 10 mg, Subcutaneous, Q24H  glipizide, 10 mg, Oral, QAM AC  Insulin Aspart, 2-12 Units, Subcutaneous, 4x Daily With Meals & Nightly  magnesium oxide, 400 mg, Oral, BID  metoprolol succinate XL, 25 mg, Oral, BID  senna-docusate sodium, 2 tablet, Oral, BID  sodium bicarbonate, 650 mg, Oral, TID  sodium chloride, 3 mL, Intravenous, Q12H  sodium chloride, 100 mL/hr, Intravenous, Once  tamsulosin, 0.4 mg, Oral, BID  vancomycin, 1,250 mg, Intravenous, Q12H      Pharmacy to dose vancomycin,       •  acetaminophen  •  bisacodyl  •  calcium carbonate  •  fluticasone  •  hydrALAZINE  •  hydrOXYzine pamoate  •  muscle rub  •  ondansetron  •  oxyCODONE-acetaminophen  •  Pharmacy to dose vancomycin  •  simethicone  •  sodium chloride  •  traZODone  Pharmacy  to dose vancomycin,        Dietary Orders (From admission, onward)     Start     Ordered    04/27/23 1718  Diet: Diabetic Diets, Cardiac Diets; Healthy Heart (2-3 Na+); Consistent Carbohydrate; Texture: Regular Texture (IDDSI 7); Fluid Consistency: Thin (IDDSI 0)  Diet Effective Now        References:    Diet Order [place]   Question Answer Comment   Diets: Diabetic Diets    Diets: Cardiac Diets    Cardiac Diet: Healthy Heart (2-3 Na+)    Diabetic Diet: Consistent Carbohydrate    Texture: Regular Texture (IDDSI 7)    Fluid Consistency: Thin (IDDSI 0)        04/27/23 1718    04/26/23 1200  Dietary Nutrition Supplements Milk  Daily With Breakfast, Lunch & Dinner      Question:  Select Supplement:  Answer:  Milk    04/26/23 2213              History, physical exam, assessment and plan may have been partly or fully copied from before, but  changes made to the copied record to reflect care on the date of service. Part of the lab and imaging  reports auto populated and corrected. Some of this note may be an electronic transcription of spoken  language to printed text. This may permit erroneous, or at times, nonsensical words or phrases to be  inadvertently transcribed. Although I have reviewed the note for such errors, some may still exist.      This document has been electronically signed by Charlie Persaud MD on May 1, 2023 08:32 CDT

## 2023-05-02 ENCOUNTER — OUTSIDE FACILITY SERVICE (OUTPATIENT)
Dept: PULMONOLOGY | Facility: CLINIC | Age: 63
End: 2023-05-02
Payer: MEDICARE

## 2023-05-02 LAB
ALBUMIN SERPL-MCNC: 3.4 G/DL (ref 3.5–5.2)
ALBUMIN/GLOB SERPL: 1.2 G/DL
ALP SERPL-CCNC: 201 U/L (ref 39–117)
ALT SERPL W P-5'-P-CCNC: 184 U/L (ref 1–41)
ANION GAP SERPL CALCULATED.3IONS-SCNC: 7 MMOL/L (ref 5–15)
AST SERPL-CCNC: 39 U/L (ref 1–40)
BASOPHILS # BLD AUTO: 0.04 10*3/MM3 (ref 0–0.2)
BASOPHILS NFR BLD AUTO: 0.7 % (ref 0–1.5)
BILIRUB SERPL-MCNC: 1 MG/DL (ref 0–1.2)
BUN SERPL-MCNC: 22 MG/DL (ref 8–23)
BUN/CREAT SERPL: 20.2 (ref 7–25)
CALCIUM SPEC-SCNC: 8.2 MG/DL (ref 8.6–10.5)
CHLORIDE SERPL-SCNC: 102 MMOL/L (ref 98–107)
CO2 SERPL-SCNC: 25 MMOL/L (ref 22–29)
CREAT SERPL-MCNC: 1.09 MG/DL (ref 0.76–1.27)
DEPRECATED RDW RBC AUTO: 51.4 FL (ref 37–54)
EGFRCR SERPLBLD CKD-EPI 2021: 77.2 ML/MIN/1.73
EOSINOPHIL # BLD AUTO: 0.2 10*3/MM3 (ref 0–0.4)
EOSINOPHIL NFR BLD AUTO: 3.4 % (ref 0.3–6.2)
ERYTHROCYTE [DISTWIDTH] IN BLOOD BY AUTOMATED COUNT: 15 % (ref 12.3–15.4)
GLOBULIN UR ELPH-MCNC: 2.8 GM/DL
GLUCOSE SERPL-MCNC: 169 MG/DL (ref 65–99)
HCT VFR BLD AUTO: 29.9 % (ref 37.5–51)
HGB BLD-MCNC: 9.2 G/DL (ref 13–17.7)
IMM GRANULOCYTES # BLD AUTO: 0.05 10*3/MM3 (ref 0–0.05)
IMM GRANULOCYTES NFR BLD AUTO: 0.8 % (ref 0–0.5)
LYMPHOCYTES # BLD AUTO: 1.17 10*3/MM3 (ref 0.7–3.1)
LYMPHOCYTES NFR BLD AUTO: 19.8 % (ref 19.6–45.3)
MAGNESIUM SERPL-MCNC: 2.2 MG/DL (ref 1.6–2.4)
MCH RBC QN AUTO: 28.8 PG (ref 26.6–33)
MCHC RBC AUTO-ENTMCNC: 30.8 G/DL (ref 31.5–35.7)
MCV RBC AUTO: 93.7 FL (ref 79–97)
MONOCYTES # BLD AUTO: 0.62 10*3/MM3 (ref 0.1–0.9)
MONOCYTES NFR BLD AUTO: 10.5 % (ref 5–12)
NEUTROPHILS NFR BLD AUTO: 3.84 10*3/MM3 (ref 1.7–7)
NEUTROPHILS NFR BLD AUTO: 64.8 % (ref 42.7–76)
NRBC BLD AUTO-RTO: 0.3 /100 WBC (ref 0–0.2)
PLATELET # BLD AUTO: 120 10*3/MM3 (ref 140–450)
PMV BLD AUTO: 10.9 FL (ref 6–12)
POTASSIUM SERPL-SCNC: 4.5 MMOL/L (ref 3.5–5.2)
PROT SERPL-MCNC: 6.2 G/DL (ref 6–8.5)
RBC # BLD AUTO: 3.19 10*6/MM3 (ref 4.14–5.8)
SODIUM SERPL-SCNC: 134 MMOL/L (ref 136–145)
SRA .2 IU/ML UFH SER-ACNC: 72 % (ref 0–20)
SRA 100IU/ML UFH SER-ACNC: <1 % (ref 0–20)
SRA UFH SER-IMP: ABNORMAL
WBC NRBC COR # BLD: 5.92 10*3/MM3 (ref 3.4–10.8)

## 2023-05-02 PROCEDURE — 25010000002 FONDAPARINUX PER 0.5 MG: Performed by: INTERNAL MEDICINE

## 2023-05-02 PROCEDURE — 97535 SELF CARE MNGMENT TRAINING: CPT

## 2023-05-02 PROCEDURE — 80053 COMPREHEN METABOLIC PANEL: CPT | Performed by: INTERNAL MEDICINE

## 2023-05-02 PROCEDURE — 82948 REAGENT STRIP/BLOOD GLUCOSE: CPT

## 2023-05-02 PROCEDURE — 25010000002 VANCOMYCIN 10 G RECONSTITUTED SOLUTION: Performed by: INTERNAL MEDICINE

## 2023-05-02 PROCEDURE — 83735 ASSAY OF MAGNESIUM: CPT | Performed by: INTERNAL MEDICINE

## 2023-05-02 PROCEDURE — 85025 COMPLETE CBC W/AUTO DIFF WBC: CPT | Performed by: INTERNAL MEDICINE

## 2023-05-02 PROCEDURE — 97110 THERAPEUTIC EXERCISES: CPT

## 2023-05-02 NOTE — PROGRESS NOTES
Pharmacokinetics by Pharmacy - Vancomycin    Emre Lisa is a 63 y.o. male  [Ht: 182.9 cm ; Wt: (!) 170 kg (375 lb 11.2 oz)] is being treated for  bone and/or joint infection , day 19 of therapy.    Estimated Creatinine Clearance: 115.8 mL/min (by C-G formula based on SCr of 1.09 mg/dL).   Creatinine   Date Value Ref Range Status   05/02/2023 1.09 0.76 - 1.27 mg/dL Final   05/01/2023 1.08 0.76 - 1.27 mg/dL Final   04/30/2023 1.06 0.76 - 1.27 mg/dL Final   11/08/2022 1.0 0.7 - 1.3 mg/dL Final      Lab Results   Component Value Date    WBC 5.92 05/02/2023    WBC 6.43 05/01/2023    WBC 6.73 04/30/2023     C-Reactive Protein   Date Value Ref Range Status   04/13/2023 1.26 (H) 0.00 - 0.50 mg/dL Final   01/26/2022 <0.30 0.00 - 0.50 mg/dL Final   01/07/2022 19.20 (H) 0.00 - 0.50 mg/dL Final     Lactate   Date Value Ref Range Status   03/11/2016 1.5 0.5 - 2.0 mmol/L Final       Temp Readings from Last 1 Encounters:   04/21/23 98.6 °F (37 °C)      Lab Results   Component Value Date    VANCOPEAK 39.60 04/23/2023    VANCOTROUGH 15.20 04/28/2023    VANCORANDOM 38.90 04/28/2023         Culture Results:  Microbiology Results (last 10 days)       ** No results found for the last 240 hours. **            Current Vancomycin Dose:  1250 mg IVPB every 12 hours, day 19 of therapy.     Assessment/Plan:  Reviewed above labs and cultures.   Vancomycin levels within range on 4/28/23.  WBC is 5.92. Cr is 1.09, stable. Will continue current dose. Will monitor levels weekly.  Pharmacy will continue to monitor renal function and adjust dose accordingly.    Sowmya Verduzco, PharmD   05/02/23 11:56 CDT

## 2023-05-02 NOTE — ACP (ADVANCE CARE PLANNING)
Visited patient during the noon hour. He said he probably got too many carbs on his tray,  but he didn't want this RDN to manage his meals with a certain number of carbs per meal. Pt said he dislikes milk ( will discontinue sending) and he is getting enough to eat. His intake is 89% on average and his last BM was 4/29. Pt will tell the PDA what he wants for supper and breakfast and he has a menu in his room to choose from. Pt's weight was noted to be up at 378lb from 375lb. No concerns noted. RDN staff will continue to monitor.

## 2023-05-02 NOTE — ACP (ADVANCE CARE PLANNING)
[facility] @ [facility]  INPATIENT PROGRESS NOTE    PATIENT NAME: Emre Lisa      PHYSICIAN: Charlie Persaud MD  : 1960        MRN: [id number]  Patient Care Team:  Jamaal Bravo MD as PCP - Marco A Cahuhan DPM as Consulting Physician (Podiatry)    Chief Complaint:  Continued antibiotic therapy and medical management  History of Present Illness: Patient is a 62 y/o male with a pmh of type 2 DM, hypertension, CAD, and neuropathy.  He recently underwent surgical repair of Charcot deformity with external fixator stabilization to his left foot related to cellulitis.  He tolerated the surgical procedure well.  He will need IV antibiotic therapy through 23 due to bone/joint infection.  He remains non-weight bearing of left lower extremity.  He tells me the pain to his left foot remains controlled, however, he does have a torn left rotator cuff and arthritis so his hip and shoulder are the source of his pain.  He tells me he is doing well and he has no other complaints or concerns.  I have reviewed the patients most recent labs and diagnostics independently.  Glucose 124, BUN 15, creatinine 0.89, sodium 138, potassium 4.2, chloride 105, co2 24, wbc 5.25, hgb 9.9, hct 30.1, platelets 156.       Assessment      Cellulitis of left foot  Charcot's joint of left foot  Type 2 DM  Arthritis  A-fib  Hypertension  Rheumatoid arthritis  DVT & GI prophylaxis     DVT Prophylaxis: Heparin  GI Prophylaxis: Protonix  Code Status: Full    Plan     -Stable at time of exam  -Improvement in shortness of breath  -LFT's and platelets continue to improve.  -Platelets 120, , AST 39  -Continue fondaparinux.  -Continue with oxygen supplementation, wean as tolerated.  -Tolerating high flow @ 4LPM.  -Continue therapy we will try to get patient out of bed to chair as tolerated.  -All specialists and consultants follow-up appreciated.  -Patient remains  clinically stable and improved.  -Blistered area noted to left anterior lower leg.  -DVT and GI prophylaxis in place.  -We'll continue monitoring patient in hospital setting and treat patient as course dictates.  -Please review orders for detailed plan of care.  -For Acute and Chronic medical condition we will continue medications described below in medication section which have been reviewed and we will continue unless changed in plan of care.  -Laboratory and diagnostic studies have been independently reviewed and the reports are reviewed as documented below.      Subjective   Interval History:   Patient Complaints:   Patient seen and examined.  Resting comfortably in bed.  No overnight events reported.  States he is continuing to feel better daily.  No concerns noted.   History taken from: Patient, patient's chart.    Review of Systems:    Review of Systems   Constitutional: Positive for activity change. Negative for chills and fever.   HENT: Negative for postnasal drip and tinnitus.    Respiratory: Positive for chest tightness and shortness of breath. Negative for apnea.    Cardiovascular: Negative for chest pain and palpitations.   Gastrointestinal: Negative for abdominal distention, nausea and vomiting.   Musculoskeletal: Positive for arthralgias and myalgias.   Skin: Positive for wound.   Neurological: Negative for tremors and seizures.   Psychiatric/Behavioral: Negative for agitation, confusion and hallucinations.       Objective   Vital Signs:  Temp: 97.3 F         Heart Rate: 91         Resp: 18          Blood Pressure: 123/59         Pulse Ox: 94%    Physical Exam:   Physical Exam  Vitals reviewed.   Constitutional:       Appearance: Normal appearance. He is not ill-appearing.   HENT:      Head: Normocephalic and atraumatic.      Mouth/Throat:      Mouth: Mucous membranes are moist.      Pharynx: Oropharynx is clear.   Eyes:      Extraocular Movements: Extraocular movements intact.      Pupils: Pupils are  equal, round, and reactive to light.   Neck:      Vascular: No carotid bruit.   Cardiovascular:      Rate and Rhythm: Regular rhythm. Tachycardia present.      Pulses: Normal pulses.      Heart sounds: Normal heart sounds. No murmur heard.    No gallop.   Pulmonary:      Effort: Pulmonary effort is normal. No respiratory distress.      Breath sounds: Normal breath sounds. No rales.   Abdominal:      General: Bowel sounds are normal. There is no distension.      Palpations: Abdomen is soft.      Tenderness: There is no guarding.   Musculoskeletal:         General: Normal range of motion.      Cervical back: Normal range of motion and neck supple.      Right lower leg: Edema present.      Left lower leg: Edema present.   Skin:     General: Skin is warm and dry.      Capillary Refill: Capillary refill takes less than 2 seconds.      Findings: Bruising present.      Comments: S/P L surgical repair Charcot foot with external fixator.  Wound vac in place   Neurological:      General: No focal deficit present.      Mental Status: He is alert and oriented to person, place, and time.   Psychiatric:         Mood and Affect: Mood normal.         Behavior: Behavior normal.         Thought Content: Thought content normal.         Judgment: Judgment normal.       Results Review:       Results from last 7 days   Lab Units 05/02/23  0625 05/01/23  0540 04/30/23  0625 04/29/23  0517 04/28/23  0545 04/27/23  0650 04/26/23  0557   SODIUM mmol/L 134* 134* 135* 133* 132* 131* 130*   POTASSIUM mmol/L 4.5 4.5 4.3 4.1 4.5 4.5 4.6   CHLORIDE mmol/L 102 100 101 100 99 98 98   CO2 mmol/L 25.0 23.0 22.0 21.0* 21.0* 25.0 20.0*   BUN mg/dL 22 22 23 26* 30* 26* 33*   CREATININE mg/dL 1.09 1.08 1.06 1.13 1.28* 1.13 1.17   GLUCOSE mg/dL 169* 172* 183* 161* 171* 163* 184*   CALCIUM mg/dL 8.2* 8.1* 8.0* 8.0* 8.1* 8.4* 8.0*   BILIRUBIN mg/dL 1.0 0.9 1.3* 1.1 1.1  --   --    ALK PHOS U/L 201* 204* 192* 201* 201*  --   --    ALT (SGPT) U/L 184* 249*  346* 480* 652*  --   --    AST (SGOT) U/L 39 74* 84* 141* 344*  --   --      Results from last 7 days   Lab Units 05/02/23  0625 05/01/23  0540 04/30/23  0625 04/29/23  0517 04/28/23  0545   MAGNESIUM mg/dL 2.2 2.1 2.1 2.3 2.4     Results from last 7 days   Lab Units 05/02/23  0625 05/01/23  0540 04/30/23  0625 04/29/23  0517 04/28/23  0545 04/27/23  0650 04/26/23  1130   WBC 10*3/mm3 5.92 6.43 6.73 6.80 7.44 7.18 7.28   HEMOGLOBIN g/dL 9.2* 8.7* 9.3* 9.4* 9.5* 10.1* 10.1*   HEMATOCRIT % 29.9* 26.9* 29.2* 28.6* 28.9* 31.6* 29.9*   PLATELETS 10*3/mm3 120* 97* 77* 60* 48* 77* 92*     Lab Results   Component Value Date    CKTOTAL 119 04/23/2023    CKMB 2.16 04/23/2023    TROPONINI <0.012 11/19/2014    TROPONINT 52 (H) 04/25/2023     CO2   Date Value Ref Range Status   05/02/2023 25.0 22.0 - 29.0 mmol/L Final     Results from last 7 days   Lab Units 04/26/23  1130   INR  1.81*     Imaging Results (Most Recent)     Procedure Component Value Units Date/Time    US Venous Doppler Lower Extremity Bilateral (duplex) [[id number]] Collected: 04/28/23 2142     Updated: 04/28/23 2154    Narrative:      EXAMINATION:  Ultrasound bilateral lower extremity venous Doppler.    COMPARISON:  No comparison.    HISTORY:  Positive PE, concern for DVT.    TECHNIQUE:  High-resolution gray scale imaging, color flow Doppler, compression were  performed from the groin to the calf of the bilateral lower extremities.  Augmentation was performed of the bilateral common femoral vein and popliteal  vein.    FINDINGS:  There is DVT within both popliteal veins.  The remaining deep veins appear  patent with normal compressibility.    There is a complex collection in the left popliteal fossa containing shadowing  calcification or two calcifications.      Impression:      1.  Bilateral DVT within the popliteal veins.    2.  Complex cystic area in the left popliteal fossa measuring up to 6.2 cm.  This may represent a complex Baker's cyst with loose bodies  or calcifications.    US Renal Bilateral [[id number]] Collected: 04/26/23 0820     Updated: 04/26/23 1208    Narrative:      HISTORY:  Urinary retention.    COMPARISON:  None    TECHNIQUE:  Real-time ultrasound imaging and color Doppler used to evaluate the kidneys and  bladder.    FINDINGS:  The right kidney measures 13.1 x 5.9 x 4.5 cm. The left kidney measures 13 x 5.8  x 6.5 cm.  Cortical thickness and echotexture are within normal limits. There is  no hydronephrosis or evidence of mass. No calculus was seen.  Lower pole cyst on  the right measuring 4.9 x 4.1 x 4.6 cm.  This appears to be an anechoic  partially exophytic cyst.    The bladder was collapsed around a Colbert catheter.      Impression:      A 5 cm lower pole right renal cyst appears simple.  No acute sonographic  abnormality.      CT Angiogram Chest [[id number]] Collected: 04/26/23 0831     Updated: 04/26/23 1137    Narrative:      EXAM:  CTA chest with contrast, PE protocol.    COMPARISON:  CTA chest PE protocol, 04/24/2023.    HISTORY:  Pulmonary embolism suspected, unknown D-dimer.  Shortness of breath.    TECHNIQUE:  Intravenous contrast was administered and axial images from the thoracic inlet  through the diaphragm were performed followed by 2D multiplanar reformats.  MIPS  were reconstructed and reviewed.    FINDINGS:  Limited by respiratory motion artifact.    Mild bilateral dependent atelectasis.  Lungs are otherwise clear.  No  consolidation.    Trace bilateral pleural effusions.  Esophagus is collapsed and poorly evaluated.  Mild bilateral gynecomastia.  There is some mild lymphadenopathy suspected in  the upper mediastinum, nonspecific.  No lymphadenopathy identified elsewhere in  the chest by size criteria.    Extensive bilateral pulmonary emboli with saddle embolus extending across the  bifurcation of the main pulmonary artery.  There is extensive embolic disease  throughout all lobes of both lungs. The Main  pulmonary artery is  enlarged  measuring up to 4.6 cm in diameter.  There is abnormal flattening or even  leftward convexity of the intraventricular septum.    Limited evaluation of the upper abdomen due to motion.  No gross acute osseous  abnormality.      Impression:      1.  Extensive bilateral pulmonary emboli with saddle embolus extending across  the main pulmonary artery bifurcation.  There is enlargement of the main  pulmonary artery as well as leftward bowing or convexity of the intraventricular  septum.  These findings may be seen in the setting of right heart strain.    2.  The exam is otherwise limited by motion.  There is mild dependent  atelectasis.  Lungs are otherwise grossly clear.    3.  Mild lymphadenopathy in the upper mediastinum, nonspecific.  Consider  reevaluation on short-term follow-up CT chest.        XR Chest 1 View [[id number]] Collected: 04/25/23 0744     Updated: 04/25/23 0902    Narrative:      HISTORY:  Post code.    COMPARISON:  4/23/2023    FINDINGS:  AP view of the chest demonstrates clear lungs and mild cardiomegaly.  There is  no pneumothorax.  Left-sided AICD is unchanged.      Impression:      Mild cardiomegaly.      CT Angiogram Chest [[id number]] Collected: 04/24/23 0511     Updated: 04/24/23 0658    Narrative:      INDICATION:  Positive D-dimer    COMPARISON:  None    TECHNIQUE:  Volumetric CT scan of the chest, from the thoracic inlet to the level of the  diaphragms, with intravenous contrast.  2D axial, sagittal, and coronal  reformats were performed.  MIPS were performed on a separate workstation and  reviewed.    FINDINGS:  Lungs: No focal consolidation or mass.  Pleura: No effusion or pneumothorax.  Vessels: Thoracic aorta is normal in caliber.  Bones: No suspicious lesions.  Visualized upper abdomen: Unremarkable.      Impression:      I suspect there are bilateral pulmonary emboli, but it is difficult to tell with  certainty due to suboptimal contrast timing and significant motion  artifact.  Repeat examination is recommended.    XR Chest 1 View [[id number]] Collected: 04/23/23 1630     Updated: 04/23/23 1648    Narrative:      HISTORY:  Shortness of breath.    FINDINGS:  Frontal film of the chest was obtained.  There is a dual-lead pacing system in  place via the left subclavian venous approach.  The pacing leads appear to be in  good position.  There is mild pulmonary vascular congestion with mild  cardiomegaly.  No infiltrate or effusion is seen.      Impression:      1.  Mild cardiomegaly with mild pulmonary vascular congestion.    2.  No acute infiltrate or effusion is seen.         No results found for: ACANTHNAEG, AFBCX, BPERTUSSISCX, BLOODCX  No results found for: BCIDPCR, CXREFLEX, CSFCX, CULTURETIS  No results found for: CULTURES, HSVCX, URCX  No results found for: EYECULTURE, GCCX, HSVCULTURE, LABHSV  No results found for: LEGIONELLA, MRSACX, MUMPSCX, MYCOPLASCX  No results found for: NOCARDIACX, STOOLCX  No results found for: THROATCX, UNSTIMCULT, URINECX, CULTURE, VZVCULTUR  No results found for: VIRALCULTU, WOUNDCX  Medication Reviewed and Will Continue Unless Documented in Plan:   ascorbic acid, 1,000 mg, Oral, Nightly  cetirizine, 10 mg, Oral, Daily  vitamin D3, 5,000 Units, Oral, Nightly  docusate sodium, 200 mg, Oral, BID  dronedarone, 400 mg, Oral, Daily With Dinner  fondaparinux, 10 mg, Subcutaneous, Q24H  glipizide, 10 mg, Oral, QAM AC  Insulin Aspart, 2-12 Units, Subcutaneous, 4x Daily With Meals & Nightly  magnesium oxide, 400 mg, Oral, BID  metoprolol succinate XL, 25 mg, Oral, BID  senna-docusate sodium, 2 tablet, Oral, BID  sodium bicarbonate, 650 mg, Oral, TID  sodium chloride, 3 mL, Intravenous, Q12H  sodium chloride, 100 mL/hr, Intravenous, Once  tamsulosin, 0.4 mg, Oral, BID  vancomycin, 1,250 mg, Intravenous, Q12H      Pharmacy to dose vancomycin,       •  acetaminophen  •  bisacodyl  •  calcium carbonate  •  fluticasone  •  hydrALAZINE  •  hydrOXYzine pamoate  •   muscle rub  •  ondansetron  •  oxyCODONE-acetaminophen  •  Pharmacy to dose vancomycin  •  simethicone  •  sodium chloride  •  traZODone  Pharmacy to dose vancomycin,        Dietary Orders (From admission, onward)     Start     Ordered    04/27/23 1718  Diet: Diabetic Diets, Cardiac Diets; Healthy Heart (2-3 Na+); Consistent Carbohydrate; Texture: Regular Texture (IDDSI 7); Fluid Consistency: Thin (IDDSI 0)  Diet Effective Now        References:    Diet Order [place]   Question Answer Comment   Diets: Diabetic Diets    Diets: Cardiac Diets    Cardiac Diet: Healthy Heart (2-3 Na+)    Diabetic Diet: Consistent Carbohydrate    Texture: Regular Texture (IDDSI 7)    Fluid Consistency: Thin (IDDSI 0)        04/27/23 1718    04/26/23 1200  Dietary Nutrition Supplements Milk  Daily With Breakfast, Lunch & Dinner      Question:  Select Supplement:  Answer:  Milk    04/26/23 1116              History, physical exam, assessment and plan may have been partly or fully copied from before, but  changes made to the copied record to reflect care on the date of service. Part of the lab and imaging  reports auto populated and corrected. Some of this note may be an electronic transcription of spoken  language to printed text. This may permit erroneous, or at times, nonsensical words or phrases to be  inadvertently transcribed. Although I have reviewed the note for such errors, some may still exist.      This document has been electronically signed by Charlie Persaud MD on May 2, 2023 08:41 CDT

## 2023-05-03 ENCOUNTER — OUTSIDE FACILITY SERVICE (OUTPATIENT)
Dept: PULMONOLOGY | Facility: CLINIC | Age: 63
End: 2023-05-03
Payer: MEDICARE

## 2023-05-03 LAB
ALBUMIN SERPL-MCNC: 3.2 G/DL (ref 3.5–5.2)
ALBUMIN/GLOB SERPL: 1.1 G/DL
ALP SERPL-CCNC: 168 U/L (ref 39–117)
ALT SERPL W P-5'-P-CCNC: 139 U/L (ref 1–41)
ANION GAP SERPL CALCULATED.3IONS-SCNC: 10 MMOL/L (ref 5–15)
AST SERPL-CCNC: 29 U/L (ref 1–40)
BASOPHILS # BLD AUTO: 0.04 10*3/MM3 (ref 0–0.2)
BASOPHILS NFR BLD AUTO: 0.8 % (ref 0–1.5)
BILIRUB SERPL-MCNC: 0.9 MG/DL (ref 0–1.2)
BUN SERPL-MCNC: 18 MG/DL (ref 8–23)
BUN/CREAT SERPL: 15.8 (ref 7–25)
CALCIUM SPEC-SCNC: 8.3 MG/DL (ref 8.6–10.5)
CHLORIDE SERPL-SCNC: 101 MMOL/L (ref 98–107)
CO2 SERPL-SCNC: 25 MMOL/L (ref 22–29)
CREAT SERPL-MCNC: 1.14 MG/DL (ref 0.76–1.27)
DEPRECATED RDW RBC AUTO: 49.7 FL (ref 37–54)
EGFRCR SERPLBLD CKD-EPI 2021: 73.2 ML/MIN/1.73
EOSINOPHIL # BLD AUTO: 0.23 10*3/MM3 (ref 0–0.4)
EOSINOPHIL NFR BLD AUTO: 4.7 % (ref 0.3–6.2)
ERYTHROCYTE [DISTWIDTH] IN BLOOD BY AUTOMATED COUNT: 14.9 % (ref 12.3–15.4)
GLOBULIN UR ELPH-MCNC: 2.8 GM/DL
GLUCOSE BLDC GLUCOMTR-MCNC: 160 MG/DL (ref 70–130)
GLUCOSE BLDC GLUCOMTR-MCNC: 167 MG/DL (ref 70–130)
GLUCOSE BLDC GLUCOMTR-MCNC: 168 MG/DL (ref 70–130)
GLUCOSE BLDC GLUCOMTR-MCNC: 174 MG/DL (ref 70–130)
GLUCOSE BLDC GLUCOMTR-MCNC: 193 MG/DL (ref 70–130)
GLUCOSE BLDC GLUCOMTR-MCNC: 261 MG/DL (ref 70–130)
GLUCOSE SERPL-MCNC: 169 MG/DL (ref 65–99)
HCT VFR BLD AUTO: 28.9 % (ref 37.5–51)
HGB BLD-MCNC: 9.2 G/DL (ref 13–17.7)
IMM GRANULOCYTES # BLD AUTO: 0.02 10*3/MM3 (ref 0–0.05)
IMM GRANULOCYTES NFR BLD AUTO: 0.4 % (ref 0–0.5)
LYMPHOCYTES # BLD AUTO: 1.12 10*3/MM3 (ref 0.7–3.1)
LYMPHOCYTES NFR BLD AUTO: 22.8 % (ref 19.6–45.3)
MAGNESIUM SERPL-MCNC: 2.1 MG/DL (ref 1.6–2.4)
MCH RBC QN AUTO: 29.2 PG (ref 26.6–33)
MCHC RBC AUTO-ENTMCNC: 31.8 G/DL (ref 31.5–35.7)
MCV RBC AUTO: 91.7 FL (ref 79–97)
MONOCYTES # BLD AUTO: 0.49 10*3/MM3 (ref 0.1–0.9)
MONOCYTES NFR BLD AUTO: 10 % (ref 5–12)
NEUTROPHILS NFR BLD AUTO: 3.02 10*3/MM3 (ref 1.7–7)
NEUTROPHILS NFR BLD AUTO: 61.3 % (ref 42.7–76)
NRBC BLD AUTO-RTO: 0 /100 WBC (ref 0–0.2)
PLATELET # BLD AUTO: 125 10*3/MM3 (ref 140–450)
PMV BLD AUTO: 10.8 FL (ref 6–12)
POTASSIUM SERPL-SCNC: 4.6 MMOL/L (ref 3.5–5.2)
PROT SERPL-MCNC: 6 G/DL (ref 6–8.5)
RBC # BLD AUTO: 3.15 10*6/MM3 (ref 4.14–5.8)
SODIUM SERPL-SCNC: 136 MMOL/L (ref 136–145)
WBC NRBC COR # BLD: 4.92 10*3/MM3 (ref 3.4–10.8)

## 2023-05-03 PROCEDURE — 82948 REAGENT STRIP/BLOOD GLUCOSE: CPT

## 2023-05-03 PROCEDURE — 83735 ASSAY OF MAGNESIUM: CPT | Performed by: INTERNAL MEDICINE

## 2023-05-03 PROCEDURE — 25010000002 FONDAPARINUX PER 0.5 MG: Performed by: INTERNAL MEDICINE

## 2023-05-03 PROCEDURE — 97110 THERAPEUTIC EXERCISES: CPT

## 2023-05-03 PROCEDURE — 25010000002 VANCOMYCIN 10 G RECONSTITUTED SOLUTION: Performed by: INTERNAL MEDICINE

## 2023-05-03 PROCEDURE — 80053 COMPREHEN METABOLIC PANEL: CPT | Performed by: INTERNAL MEDICINE

## 2023-05-03 PROCEDURE — 85025 COMPLETE CBC W/AUTO DIFF WBC: CPT | Performed by: INTERNAL MEDICINE

## 2023-05-03 PROCEDURE — 99231 SBSQ HOSP IP/OBS SF/LOW 25: CPT | Performed by: INTERNAL MEDICINE

## 2023-05-03 PROCEDURE — 97530 THERAPEUTIC ACTIVITIES: CPT

## 2023-05-03 NOTE — PROGRESS NOTES
Subjective     Emre Lisa was seen in follow-up.  No new health issues.  No bleeding.  Leg swelling continue to improve.  Might need another surgery on his leg.    Past Medical History, Past Surgical History, Social History, Family History have been reviewed and are without significant changes except as mentioned.    Review of Systems   Constitutional: Positive for activity change and fatigue. Negative for appetite change and unexpected weight change.   HENT: Negative for congestion, hearing loss, sore throat and voice change.    Respiratory: Negative for cough and shortness of breath.    Cardiovascular: Positive for leg swelling. Negative for chest pain.   Gastrointestinal: Negative for abdominal pain, blood in stool, constipation, nausea and vomiting.   Psychiatric/Behavioral: Negative for agitation, decreased concentration and dysphoric mood. The patient is not nervous/anxious.             Medications:  The current medication list was reviewed in the EMR    ALLERGIES:    Allergies   Allergen Reactions   • Heparin Other (See Comments)     Heparin induce thrombocytopenia    • Januvia [Sitagliptin] Hives   • Lipitor [Atorvastatin] Other (See Comments)     Muscle Cramps...   • Shellfish Allergy Other (See Comments)   • Sulfa Antibiotics Rash       Objective      Vitals:    05/02/23 0738 05/02/23 1729 05/03/23 0526 05/03/23 0735   Pulse: 87 87 84 87   Weight:              View : No data to display.                Physical Exam  Vitals and nursing note reviewed.   Constitutional:       Appearance: He is obese.   Abdominal:      General: There is no distension.      Palpations: Abdomen is soft. There is no mass.      Tenderness: There is no abdominal tenderness.   Musculoskeletal:      Right lower leg: Edema present.      Left lower leg: Edema present.   Skin:     General: Skin is dry.      Coloration: Skin is pale.   Neurological:      General: No focal deficit present.      Mental Status: He is alert and  oriented to person, place, and time. Mental status is at baseline.   Psychiatric:         Mood and Affect: Mood normal.         Behavior: Behavior normal.         Thought Content: Thought content normal.               RECENT LABS:Independently reviewed and summarized  Hematology WBC   Date Value Ref Range Status   05/03/2023 4.92 3.40 - 10.80 10*3/mm3 Final     RBC   Date Value Ref Range Status   05/03/2023 3.15 (L) 4.14 - 5.80 10*6/mm3 Final     Hemoglobin   Date Value Ref Range Status   05/03/2023 9.2 (L) 13.0 - 17.7 g/dL Final     Hematocrit   Date Value Ref Range Status   05/03/2023 28.9 (L) 37.5 - 51.0 % Final     Platelets   Date Value Ref Range Status   05/03/2023 125 (L) 140 - 450 10*3/mm3 Final       LAB RESULTS:      Lab 05/03/23  0647 05/02/23  0625 05/01/23  0540 04/30/23  0625 04/29/23  0517   WBC 4.92 5.92 6.43 6.73 6.80   HEMOGLOBIN 9.2* 9.2* 8.7* 9.3* 9.4*   HEMATOCRIT 28.9* 29.9* 26.9* 29.2* 28.6*   PLATELETS 125* 120* 97* 77* 60*   NEUTROS ABS 3.02 3.84 4.07 4.83 4.57   IMMATURE GRANS (ABS) 0.02 0.05 0.07* 0.10* 0.13*   LYMPHS ABS 1.12 1.17 1.35 0.85 1.20   MONOS ABS 0.49 0.62 0.81 0.86 0.78   EOS ABS 0.23 0.20 0.08 0.06 0.08   MCV 91.7 93.7 92.4 92.1 92.3         Lab 05/03/23  0647 05/02/23  0625 05/01/23  0540 04/30/23  0625 04/29/23  0517   SODIUM 136 134* 134* 135* 133*   POTASSIUM 4.6 4.5 4.5 4.3 4.1   CHLORIDE 101 102 100 101 100   CO2 25.0 25.0 23.0 22.0 21.0*   ANION GAP 10.0 7.0 11.0 12.0 12.0   BUN 18 22 22 23 26*   CREATININE 1.14 1.09 1.08 1.06 1.13   EGFR 73.2 77.2 78.1 79.8 73.9   GLUCOSE 169* 169* 172* 183* 161*   CALCIUM 8.3* 8.2* 8.1* 8.0* 8.0*   MAGNESIUM 2.1 2.2 2.1 2.1 2.3         Lab 05/03/23  0647 05/02/23  0625 05/01/23  0540 04/30/23  0625 04/29/23  0517   TOTAL PROTEIN 6.0 6.2 5.9* 6.1 6.3   ALBUMIN 3.2* 3.4* 3.3* 3.5 3.6   GLOBULIN 2.8 2.8 2.6 2.6 2.7   ALT (SGPT) 139* 184* 249* 346* 480*   AST (SGOT) 29 39 74* 84* 141*   BILIRUBIN 0.9 1.0 0.9 1.3* 1.1   ALK PHOS 168* 201*  204* 192* 201*                 Lab 04/29/23  0517   IRON 35*   IRON SATURATION 9*   TIBC 384   TRANSFERRIN 258   FERRITIN 873.10*         Brief Urine Lab Results  (Last result in the past 365 days)      Color   Clarity   Blood   Leuk Est   Nitrite   Protein   CREAT   Urine HCG        04/25/23 1751             222.9         04/25/23 1751 Yellow   Cloudy   Trace   Negative   Negative   100 mg/dL (2+)               Microbiology Results (last 10 days)     ** No results found for the last 240 hours. **                 Assessment & Plan     (1) HIT (2) Acute bilateral PE with saddle embolus (3) Bilateral DVT     Serotonin release assay is positive confirming diagnosis of heparin-induced thrombocytopenia.  He has been switched to fondaparinux.  No bleeding.  Platelet count continues to improve.  Upon discharge please prescribe him Eliquis or Xarelto.  Continue fondaparinux while he is in the hospital.  No bleeding.  Call hematology if any other questions or concerns.  Outpatient follow-up with hematology  A month after discharge.  He will need at least 6 months of anticoagulation.      5/3/2023      CC:

## 2023-05-03 NOTE — PROGRESS NOTES
Pharmacokinetics by Pharmacy - Vancomycin    Emre Lisa is a 63 y.o. male receiving vancomycin day 20 for bone and/or joint infection    Objective:  [Ht: 182.9 cm; Wt: (!) 170 kg (375 lb 11.2 oz)]     WBC   Date Value Ref Range Status   05/03/2023 4.92 3.40 - 10.80 10*3/mm3 Final   05/02/2023 5.92 3.40 - 10.80 10*3/mm3 Final   05/01/2023 6.43 3.40 - 10.80 10*3/mm3 Final    No results found for: CRP, LACTATE   Temp Readings from Last 1 Encounters:   04/21/23 98.6 °F (37 °C)     Estimated Creatinine Clearance: 110.7 mL/min (by C-G formula based on SCr of 1.14 mg/dL).   Creatinine   Date Value Ref Range Status   05/03/2023 1.14 0.76 - 1.27 mg/dL Final   05/02/2023 1.09 0.76 - 1.27 mg/dL Final   05/01/2023 1.08 0.76 - 1.27 mg/dL Final       No results found for: VANCOPEAK, VANCOTROUGH, VANCORANDOM    Culture Results:  Microbiology Results (last 10 days)     ** No results found for the last 240 hours. **        No results found for: RESPCX    Assessment:    WBC 4.92, WNL   Scr 1.14  Vancomycin levels in range on 4/28/23.  Vancomycin trough and AUC goals 10-20 and 400-600, respectively.     Plan:  1. Continue vancomycin 1250mg IV Q12H. Consult ends 5/26/23  2. Vancomycin peak on 5/5 at 0630 and vancomycin trough on 5/5 at 1530.  3. Pharmacy will monitor renal function and adjust dose accordingly.      Eloy Mendoza [facility]   05/03/23 12:33 CDT

## 2023-05-03 NOTE — ACP (ADVANCE CARE PLANNING)
[facility] @ [facility]  INPATIENT PROGRESS NOTE    PATIENT NAME: Emre Lisa      PHYSICIAN: Charlie Persaud MD  : 1960        MRN: [id number]  Patient Care Team:  Jamaal Bravo MD as PCP - Marco A Chauhan DPM as Consulting Physician (Podiatry)    Chief Complaint:  Continued antibiotic therapy and medical management  History of Present Illness: Patient is a 64 y/o male with a pmh of type 2 DM, hypertension, CAD, and neuropathy.  He recently underwent surgical repair of Charcot deformity with external fixator stabilization to his left foot related to cellulitis.  He tolerated the surgical procedure well.  He will need IV antibiotic therapy through 23 due to bone/joint infection.  He remains non-weight bearing of left lower extremity.  He tells me the pain to his left foot remains controlled, however, he does have a torn left rotator cuff and arthritis so his hip and shoulder are the source of his pain.  He tells me he is doing well and he has no other complaints or concerns.  I have reviewed the patients most recent labs and diagnostics independently.  Glucose 124, BUN 15, creatinine 0.89, sodium 138, potassium 4.2, chloride 105, co2 24, wbc 5.25, hgb 9.9, hct 30.1, platelets 156.       Assessment      Cellulitis of left foot  Charcot's joint of left foot  Type 2 DM  Arthritis  A-fib  Hypertension  Rheumatoid arthritis  DVT & GI prophylaxis     DVT Prophylaxis: Heparin  GI Prophylaxis: Protonix  Code Status: Full    Plan     -Stable at time of exam  -Denies any shortness of breath.  -Continue fondaparinux.  -Continue with oxygen supplementation, wean as tolerated.  -Tolerating oxygenation @ 4LPM.  -Continue therapy we will try to get patient out of bed to chair as tolerated.  -All specialists and consultants follow-up appreciated.  -Patient remains clinically stable and improved.  -Blistered area noted to left anterior lower leg.   Denies pain  -DVT and GI prophylaxis in place.  -We'll continue monitoring patient in hospital setting and treat patient as course dictates.  -Please review orders for detailed plan of care.  -For Acute and Chronic medical condition we will continue medications described below in medication section which have been reviewed and we will continue unless changed in plan of care.  -Laboratory and diagnostic studies have been independently reviewed and the reports are reviewed as documented below.      Subjective   Interval History:   Patient Complaints:   Patient seen and examined.  Pleasant mood.  Continues to feel better daily.  No concerns.  No overnight events reported.   History taken from: Patient, patient's chart.    Review of Systems:    Review of Systems   Constitutional: Positive for activity change. Negative for chills and fever.   HENT: Negative for postnasal drip and tinnitus.    Respiratory: Negative for apnea.    Cardiovascular: Negative for chest pain and palpitations.   Gastrointestinal: Negative for abdominal distention, nausea and vomiting.   Musculoskeletal: Positive for arthralgias and myalgias.   Skin: Positive for wound.   Neurological: Negative for tremors and seizures.   Psychiatric/Behavioral: Negative for agitation, confusion and hallucinations.       Objective   Vital Signs:  Temp: 98.3 F         Heart Rate: 91         Resp: 23          Blood Pressure: 118/57         Pulse Ox: 94%    Physical Exam:   Physical Exam  Vitals reviewed.   Constitutional:       Appearance: Normal appearance. He is not ill-appearing.   HENT:      Head: Normocephalic and atraumatic.      Mouth/Throat:      Mouth: Mucous membranes are moist.      Pharynx: Oropharynx is clear.   Eyes:      Extraocular Movements: Extraocular movements intact.      Pupils: Pupils are equal, round, and reactive to light.   Neck:      Vascular: No carotid bruit.   Cardiovascular:      Rate and Rhythm: Regular rhythm. Tachycardia present.       Pulses: Normal pulses.      Heart sounds: Normal heart sounds. No murmur heard.    No gallop.   Pulmonary:      Effort: Pulmonary effort is normal. No respiratory distress.      Breath sounds: Normal breath sounds. No rales.   Abdominal:      General: Bowel sounds are normal. There is no distension.      Palpations: Abdomen is soft.      Tenderness: There is no guarding.   Musculoskeletal:         General: Normal range of motion.      Cervical back: Normal range of motion and neck supple.      Right lower leg: Edema present.      Left lower leg: Edema present.   Skin:     General: Skin is warm and dry.      Capillary Refill: Capillary refill takes less than 2 seconds.      Findings: Bruising present.      Comments: S/P L surgical repair Charcot foot with external fixator.  Wound vac in place   Neurological:      General: No focal deficit present.      Mental Status: He is alert and oriented to person, place, and time.   Psychiatric:         Mood and Affect: Mood normal.         Behavior: Behavior normal.         Thought Content: Thought content normal.         Judgment: Judgment normal.       Results Review:       Results from last 7 days   Lab Units 05/03/23  0647 05/02/23  0625 05/01/23  0540 04/30/23  0625 04/29/23  0517 04/28/23  0545 04/27/23  0650   SODIUM mmol/L 136 134* 134* 135* 133* 132* 131*   POTASSIUM mmol/L 4.6 4.5 4.5 4.3 4.1 4.5 4.5   CHLORIDE mmol/L 101 102 100 101 100 99 98   CO2 mmol/L 25.0 25.0 23.0 22.0 21.0* 21.0* 25.0   BUN mg/dL 18 22 22 23 26* 30* 26*   CREATININE mg/dL 1.14 1.09 1.08 1.06 1.13 1.28* 1.13   GLUCOSE mg/dL 169* 169* 172* 183* 161* 171* 163*   CALCIUM mg/dL 8.3* 8.2* 8.1* 8.0* 8.0* 8.1* 8.4*   BILIRUBIN mg/dL 0.9 1.0 0.9 1.3* 1.1 1.1  --    ALK PHOS U/L 168* 201* 204* 192* 201* 201*  --    ALT (SGPT) U/L 139* 184* 249* 346* 480* 652*  --    AST (SGOT) U/L 29 39 74* 84* 141* 344*  --      Results from last 7 days   Lab Units 05/03/23  0647 05/02/23  0625 05/01/23  0540  04/30/23  0625 04/29/23  0517 04/28/23  0545   MAGNESIUM mg/dL 2.1 2.2 2.1 2.1 2.3 2.4     Results from last 7 days   Lab Units 05/03/23  0647 05/02/23  0625 05/01/23  0540 04/30/23  0625 04/29/23  0517 04/28/23  0545 04/27/23  0650   WBC 10*3/mm3 4.92 5.92 6.43 6.73 6.80 7.44 7.18   HEMOGLOBIN g/dL 9.2* 9.2* 8.7* 9.3* 9.4* 9.5* 10.1*   HEMATOCRIT % 28.9* 29.9* 26.9* 29.2* 28.6* 28.9* 31.6*   PLATELETS 10*3/mm3 125* 120* 97* 77* 60* 48* 77*     Lab Results   Component Value Date    CKTOTAL 119 04/23/2023    CKMB 2.16 04/23/2023    TROPONINI <0.012 11/19/2014    TROPONINT 52 (H) 04/25/2023     CO2   Date Value Ref Range Status   05/03/2023 25.0 22.0 - 29.0 mmol/L Final     Results from last 7 days   Lab Units 04/26/23  1130   INR  1.81*     Imaging Results (Most Recent)     Procedure Component Value Units Date/Time    US Venous Doppler Lower Extremity Bilateral (duplex) [[id number]] Collected: 04/28/23 2142     Updated: 04/28/23 2154    Narrative:      EXAMINATION:  Ultrasound bilateral lower extremity venous Doppler.    COMPARISON:  No comparison.    HISTORY:  Positive PE, concern for DVT.    TECHNIQUE:  High-resolution gray scale imaging, color flow Doppler, compression were  performed from the groin to the calf of the bilateral lower extremities.  Augmentation was performed of the bilateral common femoral vein and popliteal  vein.    FINDINGS:  There is DVT within both popliteal veins.  The remaining deep veins appear  patent with normal compressibility.    There is a complex collection in the left popliteal fossa containing shadowing  calcification or two calcifications.      Impression:      1.  Bilateral DVT within the popliteal veins.    2.  Complex cystic area in the left popliteal fossa measuring up to 6.2 cm.  This may represent a complex Baker's cyst with loose bodies or calcifications.    US Renal Bilateral [[id number]] Collected: 04/26/23 0820     Updated: 04/26/23 1208    Narrative:      HISTORY:  Urinary  retention.    COMPARISON:  None    TECHNIQUE:  Real-time ultrasound imaging and color Doppler used to evaluate the kidneys and  bladder.    FINDINGS:  The right kidney measures 13.1 x 5.9 x 4.5 cm. The left kidney measures 13 x 5.8  x 6.5 cm.  Cortical thickness and echotexture are within normal limits. There is  no hydronephrosis or evidence of mass. No calculus was seen.  Lower pole cyst on  the right measuring 4.9 x 4.1 x 4.6 cm.  This appears to be an anechoic  partially exophytic cyst.    The bladder was collapsed around a Colbert catheter.      Impression:      A 5 cm lower pole right renal cyst appears simple.  No acute sonographic  abnormality.      CT Angiogram Chest [[id number]] Collected: 04/26/23 0831     Updated: 04/26/23 1137    Narrative:      EXAM:  CTA chest with contrast, PE protocol.    COMPARISON:  CTA chest PE protocol, 04/24/2023.    HISTORY:  Pulmonary embolism suspected, unknown D-dimer.  Shortness of breath.    TECHNIQUE:  Intravenous contrast was administered and axial images from the thoracic inlet  through the diaphragm were performed followed by 2D multiplanar reformats.  MIPS  were reconstructed and reviewed.    FINDINGS:  Limited by respiratory motion artifact.    Mild bilateral dependent atelectasis.  Lungs are otherwise clear.  No  consolidation.    Trace bilateral pleural effusions.  Esophagus is collapsed and poorly evaluated.  Mild bilateral gynecomastia.  There is some mild lymphadenopathy suspected in  the upper mediastinum, nonspecific.  No lymphadenopathy identified elsewhere in  the chest by size criteria.    Extensive bilateral pulmonary emboli with saddle embolus extending across the  bifurcation of the main pulmonary artery.  There is extensive embolic disease  throughout all lobes of both lungs. The Main  pulmonary artery is enlarged  measuring up to 4.6 cm in diameter.  There is abnormal flattening or even  leftward convexity of the intraventricular septum.    Limited  evaluation of the upper abdomen due to motion.  No gross acute osseous  abnormality.      Impression:      1.  Extensive bilateral pulmonary emboli with saddle embolus extending across  the main pulmonary artery bifurcation.  There is enlargement of the main  pulmonary artery as well as leftward bowing or convexity of the intraventricular  septum.  These findings may be seen in the setting of right heart strain.    2.  The exam is otherwise limited by motion.  There is mild dependent  atelectasis.  Lungs are otherwise grossly clear.    3.  Mild lymphadenopathy in the upper mediastinum, nonspecific.  Consider  reevaluation on short-term follow-up CT chest.        XR Chest 1 View [[id number]] Collected: 04/25/23 0744     Updated: 04/25/23 0902    Narrative:      HISTORY:  Post code.    COMPARISON:  4/23/2023    FINDINGS:  AP view of the chest demonstrates clear lungs and mild cardiomegaly.  There is  no pneumothorax.  Left-sided AICD is unchanged.      Impression:      Mild cardiomegaly.      CT Angiogram Chest [[id number]] Collected: 04/24/23 0511     Updated: 04/24/23 0658    Narrative:      INDICATION:  Positive D-dimer    COMPARISON:  None    TECHNIQUE:  Volumetric CT scan of the chest, from the thoracic inlet to the level of the  diaphragms, with intravenous contrast.  2D axial, sagittal, and coronal  reformats were performed.  MIPS were performed on a separate workstation and  reviewed.    FINDINGS:  Lungs: No focal consolidation or mass.  Pleura: No effusion or pneumothorax.  Vessels: Thoracic aorta is normal in caliber.  Bones: No suspicious lesions.  Visualized upper abdomen: Unremarkable.      Impression:      I suspect there are bilateral pulmonary emboli, but it is difficult to tell with  certainty due to suboptimal contrast timing and significant motion artifact.  Repeat examination is recommended.    XR Chest 1 View [[id number]] Collected: 04/23/23 1630     Updated: 04/23/23 1648    Narrative:       HISTORY:  Shortness of breath.    FINDINGS:  Frontal film of the chest was obtained.  There is a dual-lead pacing system in  place via the left subclavian venous approach.  The pacing leads appear to be in  good position.  There is mild pulmonary vascular congestion with mild  cardiomegaly.  No infiltrate or effusion is seen.      Impression:      1.  Mild cardiomegaly with mild pulmonary vascular congestion.    2.  No acute infiltrate or effusion is seen.         No results found for: ACANTHNAEG, AFBCX, BPERTUSSISCX, BLOODCX  No results found for: BCIDPCR, CXREFLEX, CSFCX, CULTURETIS  No results found for: CULTURES, HSVCX, URCX  No results found for: EYECULTURE, GCCX, HSVCULTURE, LABHSV  No results found for: LEGIONELLA, MRSACX, MUMPSCX, MYCOPLASCX  No results found for: NOCARDIACX, STOOLCX  No results found for: THROATCX, UNSTIMCULT, URINECX, CULTURE, VZVCULTUR  No results found for: VIRALCULTU, WOUNDCX  Medication Reviewed and Will Continue Unless Documented in Plan:   ascorbic acid, 1,000 mg, Oral, Nightly  cetirizine, 10 mg, Oral, Daily  vitamin D3, 5,000 Units, Oral, Nightly  docusate sodium, 200 mg, Oral, BID  dronedarone, 400 mg, Oral, Daily With Dinner  fondaparinux, 10 mg, Subcutaneous, Q24H  glipizide, 10 mg, Oral, QAM AC  Insulin Aspart, 2-12 Units, Subcutaneous, 4x Daily With Meals & Nightly  magnesium oxide, 400 mg, Oral, BID  metoprolol succinate XL, 25 mg, Oral, BID  senna-docusate sodium, 2 tablet, Oral, BID  sodium bicarbonate, 650 mg, Oral, TID  sodium chloride, 3 mL, Intravenous, Q12H  sodium chloride, 100 mL/hr, Intravenous, Once  tamsulosin, 0.4 mg, Oral, BID  vancomycin, 1,250 mg, Intravenous, Q12H      Pharmacy to dose vancomycin,       •  acetaminophen  •  bisacodyl  •  calcium carbonate  •  fluticasone  •  hydrALAZINE  •  hydrOXYzine pamoate  •  muscle rub  •  ondansetron  •  oxyCODONE-acetaminophen  •  Pharmacy to dose vancomycin  •  simethicone  •  sodium chloride  •  traZODone  Pharmacy  to dose vancomycin,        Dietary Orders (From admission, onward)     Start     Ordered    04/27/23 1718  Diet: Diabetic Diets, Cardiac Diets; Healthy Heart (2-3 Na+); Consistent Carbohydrate; Texture: Regular Texture (IDDSI 7); Fluid Consistency: Thin (IDDSI 0)  Diet Effective Now        References:    Diet Order [place]   Question Answer Comment   Diets: Diabetic Diets    Diets: Cardiac Diets    Cardiac Diet: Healthy Heart (2-3 Na+)    Diabetic Diet: Consistent Carbohydrate    Texture: Regular Texture (IDDSI 7)    Fluid Consistency: Thin (IDDSI 0)        04/27/23 1718              History, physical exam, assessment and plan may have been partly or fully copied from before, but  changes made to the copied record to reflect care on the date of service. Part of the lab and imaging  reports auto populated and corrected. Some of this note may be an electronic transcription of spoken  language to printed text. This may permit erroneous, or at times, nonsensical words or phrases to be  inadvertently transcribed. Although I have reviewed the note for such errors, some may still exist.      This document has been electronically signed by Charlie Persaud MD on May 3, 2023 09:57 CDT

## 2023-05-04 ENCOUNTER — OUTSIDE FACILITY SERVICE (OUTPATIENT)
Dept: PULMONOLOGY | Facility: CLINIC | Age: 63
End: 2023-05-04
Payer: MEDICARE

## 2023-05-04 LAB
ALBUMIN SERPL-MCNC: 3.2 G/DL (ref 3.5–5.2)
ALBUMIN/GLOB SERPL: 1.1 G/DL
ALP SERPL-CCNC: 151 U/L (ref 39–117)
ALT SERPL W P-5'-P-CCNC: 103 U/L (ref 1–41)
ANION GAP SERPL CALCULATED.3IONS-SCNC: 10 MMOL/L (ref 5–15)
AST SERPL-CCNC: 25 U/L (ref 1–40)
BASOPHILS # BLD AUTO: 0.02 10*3/MM3 (ref 0–0.2)
BASOPHILS NFR BLD AUTO: 0.5 % (ref 0–1.5)
BILIRUB SERPL-MCNC: 0.8 MG/DL (ref 0–1.2)
BUN SERPL-MCNC: 19 MG/DL (ref 8–23)
BUN/CREAT SERPL: 18.3 (ref 7–25)
CALCIUM SPEC-SCNC: 8.3 MG/DL (ref 8.6–10.5)
CHLORIDE SERPL-SCNC: 102 MMOL/L (ref 98–107)
CO2 SERPL-SCNC: 24 MMOL/L (ref 22–29)
CREAT SERPL-MCNC: 1.04 MG/DL (ref 0.76–1.27)
DEPRECATED RDW RBC AUTO: 49.9 FL (ref 37–54)
EGFRCR SERPLBLD CKD-EPI 2021: 81.7 ML/MIN/1.73
EOSINOPHIL # BLD AUTO: 0.21 10*3/MM3 (ref 0–0.4)
EOSINOPHIL NFR BLD AUTO: 4.8 % (ref 0.3–6.2)
ERYTHROCYTE [DISTWIDTH] IN BLOOD BY AUTOMATED COUNT: 14.7 % (ref 12.3–15.4)
GLOBULIN UR ELPH-MCNC: 2.9 GM/DL
GLUCOSE BLDC GLUCOMTR-MCNC: 131 MG/DL (ref 70–130)
GLUCOSE BLDC GLUCOMTR-MCNC: 163 MG/DL (ref 70–130)
GLUCOSE BLDC GLUCOMTR-MCNC: 169 MG/DL (ref 70–130)
GLUCOSE BLDC GLUCOMTR-MCNC: 198 MG/DL (ref 70–130)
GLUCOSE BLDC GLUCOMTR-MCNC: 223 MG/DL (ref 70–130)
GLUCOSE BLDC GLUCOMTR-MCNC: 236 MG/DL (ref 70–130)
GLUCOSE SERPL-MCNC: 193 MG/DL (ref 65–99)
HCT VFR BLD AUTO: 30.4 % (ref 37.5–51)
HGB BLD-MCNC: 9.4 G/DL (ref 13–17.7)
IMM GRANULOCYTES # BLD AUTO: 0.02 10*3/MM3 (ref 0–0.05)
IMM GRANULOCYTES NFR BLD AUTO: 0.5 % (ref 0–0.5)
LYMPHOCYTES # BLD AUTO: 1.09 10*3/MM3 (ref 0.7–3.1)
LYMPHOCYTES NFR BLD AUTO: 25 % (ref 19.6–45.3)
MAGNESIUM SERPL-MCNC: 2 MG/DL (ref 1.6–2.4)
MCH RBC QN AUTO: 28.5 PG (ref 26.6–33)
MCHC RBC AUTO-ENTMCNC: 30.9 G/DL (ref 31.5–35.7)
MCV RBC AUTO: 92.1 FL (ref 79–97)
MONOCYTES # BLD AUTO: 0.42 10*3/MM3 (ref 0.1–0.9)
MONOCYTES NFR BLD AUTO: 9.6 % (ref 5–12)
NEUTROPHILS NFR BLD AUTO: 2.6 10*3/MM3 (ref 1.7–7)
NEUTROPHILS NFR BLD AUTO: 59.6 % (ref 42.7–76)
NRBC BLD AUTO-RTO: 0 /100 WBC (ref 0–0.2)
PLATELET # BLD AUTO: 142 10*3/MM3 (ref 140–450)
PMV BLD AUTO: 10.7 FL (ref 6–12)
POTASSIUM SERPL-SCNC: 4.4 MMOL/L (ref 3.5–5.2)
PROT SERPL-MCNC: 6.1 G/DL (ref 6–8.5)
RBC # BLD AUTO: 3.3 10*6/MM3 (ref 4.14–5.8)
SODIUM SERPL-SCNC: 136 MMOL/L (ref 136–145)
WBC NRBC COR # BLD: 4.36 10*3/MM3 (ref 3.4–10.8)

## 2023-05-04 PROCEDURE — 97110 THERAPEUTIC EXERCISES: CPT

## 2023-05-04 PROCEDURE — 25010000002 FONDAPARINUX PER 0.5 MG: Performed by: INTERNAL MEDICINE

## 2023-05-04 PROCEDURE — 82948 REAGENT STRIP/BLOOD GLUCOSE: CPT

## 2023-05-04 PROCEDURE — 80053 COMPREHEN METABOLIC PANEL: CPT | Performed by: INTERNAL MEDICINE

## 2023-05-04 PROCEDURE — 85025 COMPLETE CBC W/AUTO DIFF WBC: CPT | Performed by: INTERNAL MEDICINE

## 2023-05-04 PROCEDURE — 25010000002 VANCOMYCIN 10 G RECONSTITUTED SOLUTION: Performed by: INTERNAL MEDICINE

## 2023-05-04 PROCEDURE — 97530 THERAPEUTIC ACTIVITIES: CPT

## 2023-05-04 PROCEDURE — 83735 ASSAY OF MAGNESIUM: CPT | Performed by: INTERNAL MEDICINE

## 2023-05-04 NOTE — PROGRESS NOTES
Pharmacokinetics by Pharmacy - Vancomycin    Emre Lisa is a 63 y.o. male  [Ht: 182.9 cm ; Wt: (!) 170 kg (375 lb 11.2 oz)] is being treated for  bone/joint infection , day 21 of therapy.    Estimated Creatinine Clearance: 121.3 mL/min (by C-G formula based on SCr of 1.04 mg/dL).   Creatinine   Date Value Ref Range Status   05/04/2023 1.04 0.76 - 1.27 mg/dL Final   05/03/2023 1.14 0.76 - 1.27 mg/dL Final   05/02/2023 1.09 0.76 - 1.27 mg/dL Final   11/08/2022 1.0 0.7 - 1.3 mg/dL Final      Lab Results   Component Value Date    WBC 4.36 05/04/2023    WBC 4.92 05/03/2023    WBC 5.92 05/02/2023     C-Reactive Protein   Date Value Ref Range Status   04/13/2023 1.26 (H) 0.00 - 0.50 mg/dL Final   01/26/2022 <0.30 0.00 - 0.50 mg/dL Final   01/07/2022 19.20 (H) 0.00 - 0.50 mg/dL Final     Lactate   Date Value Ref Range Status   03/11/2016 1.5 0.5 - 2.0 mmol/L Final       Temp Readings from Last 1 Encounters:   04/21/23 98.6 °F (37 °C)      Lab Results   Component Value Date    VANCOPEAK 39.60 04/23/2023    VANCOTROUGH 15.20 04/28/2023    VANCORANDOM 38.90 04/28/2023         Culture Results:  Microbiology Results (last 10 days)       ** No results found for the last 240 hours. **          Current Vancomycin Dose:  1250 mg IVPB every 12 hours, day 21 of therapy.     Assessment/Plan:  Reviewed above labs and cultures.   No new cultures.  WBC is 4.36, in goal range. Cr is 1.04  Vancomycin levels in range on 4/28/23.  Levels are ordered for tomorrow at 0630 and 1530.  Will continue current dose.  Pharmacy will continue to monitor renal function and adjust dose accordingly.    Sowmya Verduzco, PharmD   05/04/23 10:11 CDT

## 2023-05-05 ENCOUNTER — OUTSIDE FACILITY SERVICE (OUTPATIENT)
Dept: PULMONOLOGY | Facility: CLINIC | Age: 63
End: 2023-05-05
Payer: MEDICARE

## 2023-05-05 LAB
ALBUMIN SERPL-MCNC: 3.3 G/DL (ref 3.5–5.2)
ALBUMIN/GLOB SERPL: 1.2 G/DL
ALP SERPL-CCNC: 132 U/L (ref 39–117)
ALT SERPL W P-5'-P-CCNC: 78 U/L (ref 1–41)
ANION GAP SERPL CALCULATED.3IONS-SCNC: 11 MMOL/L (ref 5–15)
AST SERPL-CCNC: 21 U/L (ref 1–40)
BASOPHILS # BLD AUTO: 0.04 10*3/MM3 (ref 0–0.2)
BASOPHILS NFR BLD AUTO: 1 % (ref 0–1.5)
BILIRUB SERPL-MCNC: 0.6 MG/DL (ref 0–1.2)
BUN SERPL-MCNC: 20 MG/DL (ref 8–23)
BUN/CREAT SERPL: 19.8 (ref 7–25)
CALCIUM SPEC-SCNC: 8.7 MG/DL (ref 8.6–10.5)
CHLORIDE SERPL-SCNC: 101 MMOL/L (ref 98–107)
CO2 SERPL-SCNC: 25 MMOL/L (ref 22–29)
CREAT SERPL-MCNC: 1.01 MG/DL (ref 0.76–1.27)
DEPRECATED RDW RBC AUTO: 50.3 FL (ref 37–54)
EGFRCR SERPLBLD CKD-EPI 2021: 84.6 ML/MIN/1.73
EOSINOPHIL # BLD AUTO: 0.19 10*3/MM3 (ref 0–0.4)
EOSINOPHIL NFR BLD AUTO: 4.7 % (ref 0.3–6.2)
ERYTHROCYTE [DISTWIDTH] IN BLOOD BY AUTOMATED COUNT: 15.1 % (ref 12.3–15.4)
GLOBULIN UR ELPH-MCNC: 2.8 GM/DL
GLUCOSE BLDC GLUCOMTR-MCNC: 157 MG/DL (ref 70–130)
GLUCOSE BLDC GLUCOMTR-MCNC: 195 MG/DL (ref 70–130)
GLUCOSE BLDC GLUCOMTR-MCNC: 212 MG/DL (ref 70–130)
GLUCOSE SERPL-MCNC: 186 MG/DL (ref 65–99)
HCT VFR BLD AUTO: 30.6 % (ref 37.5–51)
HGB BLD-MCNC: 9.5 G/DL (ref 13–17.7)
IMM GRANULOCYTES # BLD AUTO: 0.02 10*3/MM3 (ref 0–0.05)
IMM GRANULOCYTES NFR BLD AUTO: 0.5 % (ref 0–0.5)
LYMPHOCYTES # BLD AUTO: 1.04 10*3/MM3 (ref 0.7–3.1)
LYMPHOCYTES NFR BLD AUTO: 25.6 % (ref 19.6–45.3)
MAGNESIUM SERPL-MCNC: 2 MG/DL (ref 1.6–2.4)
MCH RBC QN AUTO: 28.5 PG (ref 26.6–33)
MCHC RBC AUTO-ENTMCNC: 31 G/DL (ref 31.5–35.7)
MCV RBC AUTO: 91.9 FL (ref 79–97)
MONOCYTES # BLD AUTO: 0.39 10*3/MM3 (ref 0.1–0.9)
MONOCYTES NFR BLD AUTO: 9.6 % (ref 5–12)
NEUTROPHILS NFR BLD AUTO: 2.39 10*3/MM3 (ref 1.7–7)
NEUTROPHILS NFR BLD AUTO: 58.6 % (ref 42.7–76)
NRBC BLD AUTO-RTO: 0 /100 WBC (ref 0–0.2)
PLATELET # BLD AUTO: 169 10*3/MM3 (ref 140–450)
PMV BLD AUTO: 11.2 FL (ref 6–12)
POTASSIUM SERPL-SCNC: 4.5 MMOL/L (ref 3.5–5.2)
PROT SERPL-MCNC: 6.1 G/DL (ref 6–8.5)
PROTHROMBIN FRAGMENT 1.2 AG [MOLES/VOLUME] IN SERUM OR PLASMA BY IMMUNOASSAY: 872 PMOL/L
QT INTERVAL: 370 MS
QTC INTERVAL: 489 MS
RBC # BLD AUTO: 3.33 10*6/MM3 (ref 4.14–5.8)
SODIUM SERPL-SCNC: 137 MMOL/L (ref 136–145)
VANCOMYCIN PEAK SERPL-MCNC: 22.6 MCG/ML (ref 20–40)
VANCOMYCIN TROUGH SERPL-MCNC: 13.8 MCG/ML (ref 5–20)
WBC NRBC COR # BLD: 4.07 10*3/MM3 (ref 3.4–10.8)

## 2023-05-05 PROCEDURE — 83735 ASSAY OF MAGNESIUM: CPT | Performed by: INTERNAL MEDICINE

## 2023-05-05 PROCEDURE — 80202 ASSAY OF VANCOMYCIN: CPT | Performed by: INTERNAL MEDICINE

## 2023-05-05 PROCEDURE — 85025 COMPLETE CBC W/AUTO DIFF WBC: CPT | Performed by: INTERNAL MEDICINE

## 2023-05-05 PROCEDURE — 97530 THERAPEUTIC ACTIVITIES: CPT

## 2023-05-05 PROCEDURE — 97535 SELF CARE MNGMENT TRAINING: CPT

## 2023-05-05 PROCEDURE — 82948 REAGENT STRIP/BLOOD GLUCOSE: CPT

## 2023-05-05 PROCEDURE — 97110 THERAPEUTIC EXERCISES: CPT

## 2023-05-05 PROCEDURE — 25010000002 VANCOMYCIN 10 G RECONSTITUTED SOLUTION: Performed by: INTERNAL MEDICINE

## 2023-05-05 PROCEDURE — 25010000002 FONDAPARINUX PER 0.5 MG: Performed by: INTERNAL MEDICINE

## 2023-05-05 PROCEDURE — 80053 COMPREHEN METABOLIC PANEL: CPT | Performed by: INTERNAL MEDICINE

## 2023-05-05 PROCEDURE — 99231 SBSQ HOSP IP/OBS SF/LOW 25: CPT | Performed by: INTERNAL MEDICINE

## 2023-05-05 RX ORDER — FLUTICASONE PROPIONATE 50 MCG
2 SPRAY, SUSPENSION (ML) NASAL DAILY
Status: DISCONTINUED | OUTPATIENT
Start: 2023-05-05 | End: 2023-05-15 | Stop reason: HOSPADM

## 2023-05-05 NOTE — PROGRESS NOTES
Pharmacokinetics by Pharmacy - Vancomycin    Emre Lisa is a 63 y.o. male receiving vancomycin 1250 mg IV q12hr (day 22) for bone and/or joint infection.    Objective:    Temp Readings from Last 1 Encounters:   04/21/23 98.6 °F (37 °C)     WBC   Date Value Ref Range Status   05/04/2023 4.36 3.40 - 10.80 10*3/mm3 Final   05/03/2023 4.92 3.40 - 10.80 10*3/mm3 Final    No results found for: CRP, LACTATE   Creatinine   Date Value Ref Range Status   05/05/2023 1.01 0.76 - 1.27 mg/dL Final   05/04/2023 1.04 0.76 - 1.27 mg/dL Final   05/03/2023 1.14 0.76 - 1.27 mg/dL Final       Vancomycin Peak   Date Value Ref Range Status   05/05/2023 22.60 20.00 - 40.00 mcg/mL Final       Culture Results:  Microbiology Results (last 10 days)     ** No results found for the last 240 hours. **        No results found for: RESPCX    [Ht:  ; Wt: (!) 170 kg (375 lb 11.2 oz)]   Body mass index is 50.95 kg/m².  Ideal body weight: 77.6 kg (171 lb 1.2 oz)  Adjusted ideal body weight: 115 kg (252 lb 14.8 oz)      Assessment:  • WBCs WNL  • Creatinine 1.01 mcg/mL and WNL  • Vancomycin peak and trough resulted at 22.6 mcg/mL and 13.8 mcg/mL, respectively. These give a calculated AUC and trough of 444 and 13.9 mcg/mL, respectively. These are within goal range.   • AUC and trough goals are 400-600 and 10-20 mcg/mL, respectively.     Plan:  1. Continue Vancomycin 1250 mg IV q12hrs  2. Vancomycin levels when clinically indicated. Consulted until 5/26.  3. Pharmacy will monitor renal function and adjust dose accordingly.    Thank you for this consult.     Estiven Cannon [facility]   05/05/23 09:18 CDT

## 2023-05-05 NOTE — ACP (ADVANCE CARE PLANNING)
[facility] @ [facility]  INPATIENT PROGRESS NOTE    PATIENT NAME: Emre Lisa      PHYSICIAN: Charlie Persaud MD  : 1960        MRN: [id number]  Patient Care Team:  Jamaal Bravo MD as PCP - Marco A Chauhan DPM as Consulting Physician (Podiatry)    Chief Complaint:  Continued antibiotic therapy and medical management  History of Present Illness: Patient is a 62 y/o male with a pmh of type 2 DM, hypertension, CAD, and neuropathy.  He recently underwent surgical repair of Charcot deformity with external fixator stabilization to his left foot related to cellulitis.  He tolerated the surgical procedure well.  He will need IV antibiotic therapy through 23 due to bone/joint infection.  He remains non-weight bearing of left lower extremity.  He tells me the pain to his left foot remains controlled, however, he does have a torn left rotator cuff and arthritis so his hip and shoulder are the source of his pain.  He tells me he is doing well and he has no other complaints or concerns.  I have reviewed the patients most recent labs and diagnostics independently.  Glucose 124, BUN 15, creatinine 0.89, sodium 138, potassium 4.2, chloride 105, co2 24, wbc 5.25, hgb 9.9, hct 30.1, platelets 156.       Assessment      Cellulitis of left foot  Charcot's joint of left foot  Type 2 DM  Arthritis  A-fib  Hypertension  Rheumatoid arthritis  DVT & GI prophylaxis     DVT Prophylaxis: Heparin  GI Prophylaxis: Protonix  Code Status: Full    Plan     -Stable at time of exam  -Labs continue to improve and remain stable  -Decreased edema to ble  -Continues to tolerate oxygen weaning without difficulty  -Continue fondaparinux.  -Continue with oxygen supplementation, wean as tolerated.  -Continue therapy we will try to get patient out of bed to chair as tolerated.  -All specialists and consultants follow-up appreciated.  -Patient remains clinically stable and  improved.  -DVT and GI prophylaxis in place.  -We'll continue monitoring patient in hospital setting and treat patient as course dictates.  -Please review orders for detailed plan of care.  -For Acute and Chronic medical condition we will continue medications described below in medication section which have been reviewed and we will continue unless changed in plan of care.  -Laboratory and diagnostic studies have been independently reviewed and the reports are reviewed as documented below.      Subjective   Interval History:   Patient Complaints:   Patient seen and examined.  Resting comfortably in bed.  Clinically improving.  No concerns.  No overnight events noted.   History taken from: Patient, patient's chart.    Review of Systems:    Review of Systems   Constitutional: Positive for activity change. Negative for chills and fever.   HENT: Negative for postnasal drip and tinnitus.    Respiratory: Negative for apnea.    Cardiovascular: Negative for chest pain and palpitations.   Gastrointestinal: Negative for abdominal distention, nausea and vomiting.   Musculoskeletal: Positive for arthralgias and myalgias.   Skin: Positive for wound.   Neurological: Negative for tremors and seizures.   Psychiatric/Behavioral: Negative for agitation, confusion and hallucinations.       Objective   Vital Signs:  Temp: 97.1 F         Heart Rate: 85         Resp: 20          Blood Pressure: 141/65         Pulse Ox: 97%    Physical Exam:   Physical Exam  Vitals reviewed.   Constitutional:       Appearance: Normal appearance. He is not ill-appearing.   HENT:      Head: Normocephalic and atraumatic.      Mouth/Throat:      Mouth: Mucous membranes are moist.      Pharynx: Oropharynx is clear.   Eyes:      Extraocular Movements: Extraocular movements intact.      Pupils: Pupils are equal, round, and reactive to light.   Neck:      Vascular: No carotid bruit.   Cardiovascular:      Rate and Rhythm: Regular rhythm. Tachycardia present.       Pulses: Normal pulses.      Heart sounds: Normal heart sounds. No murmur heard.    No gallop.   Pulmonary:      Effort: Pulmonary effort is normal. No respiratory distress.      Breath sounds: Normal breath sounds. No rales.   Abdominal:      General: Bowel sounds are normal. There is no distension.      Palpations: Abdomen is soft.      Tenderness: There is no guarding.   Musculoskeletal:         General: Normal range of motion.      Cervical back: Normal range of motion and neck supple.      Right lower leg: Edema present.      Left lower leg: Edema present.   Skin:     General: Skin is warm and dry.      Capillary Refill: Capillary refill takes less than 2 seconds.      Findings: Bruising present.      Comments: S/P L surgical repair Charcot foot with external fixator.  Wound vac in place   Neurological:      General: No focal deficit present.      Mental Status: He is alert and oriented to person, place, and time.   Psychiatric:         Mood and Affect: Mood normal.         Behavior: Behavior normal.         Thought Content: Thought content normal.         Judgment: Judgment normal.       Results Review:       Results from last 7 days   Lab Units 05/05/23  0624 05/04/23  0625 05/03/23  0647 05/02/23  0625 05/01/23  0540 04/30/23  0625 04/29/23  0517   SODIUM mmol/L 137 136 136 134* 134* 135* 133*   POTASSIUM mmol/L 4.5 4.4 4.6 4.5 4.5 4.3 4.1   CHLORIDE mmol/L 101 102 101 102 100 101 100   CO2 mmol/L 25.0 24.0 25.0 25.0 23.0 22.0 21.0*   BUN mg/dL 20 19 18 22 22 23 26*   CREATININE mg/dL 1.01 1.04 1.14 1.09 1.08 1.06 1.13   GLUCOSE mg/dL 186* 193* 169* 169* 172* 183* 161*   CALCIUM mg/dL 8.7 8.3* 8.3* 8.2* 8.1* 8.0* 8.0*   BILIRUBIN mg/dL 0.6 0.8 0.9 1.0 0.9 1.3* 1.1   ALK PHOS U/L 132* 151* 168* 201* 204* 192* 201*   ALT (SGPT) U/L 78* 103* 139* 184* 249* 346* 480*   AST (SGOT) U/L 21 25 29 39 74* 84* 141*     Results from last 7 days   Lab Units 05/05/23  0624 05/04/23  0625 05/03/23  0647 05/02/23  0625  05/01/23  0540 04/30/23  0625 04/29/23  0517   MAGNESIUM mg/dL 2.0 2.0 2.1 2.2 2.1 2.1 2.3     Results from last 7 days   Lab Units 05/04/23  0625 05/03/23  0647 05/02/23  0625 05/01/23  0540 04/30/23  0625 04/29/23  0517   WBC 10*3/mm3 4.36 4.92 5.92 6.43 6.73 6.80   HEMOGLOBIN g/dL 9.4* 9.2* 9.2* 8.7* 9.3* 9.4*   HEMATOCRIT % 30.4* 28.9* 29.9* 26.9* 29.2* 28.6*   PLATELETS 10*3/mm3 142 125* 120* 97* 77* 60*     Lab Results   Component Value Date    CKTOTAL 119 04/23/2023    CKMB 2.16 04/23/2023    TROPONINI <0.012 11/19/2014    TROPONINT 52 (H) 04/25/2023     CO2   Date Value Ref Range Status   05/05/2023 25.0 22.0 - 29.0 mmol/L Final         Imaging Results (Most Recent)     Procedure Component Value Units Date/Time    US Venous Doppler Lower Extremity Bilateral (duplex) [[id number]] Collected: 04/28/23 2142     Updated: 04/28/23 2154    Narrative:      EXAMINATION:  Ultrasound bilateral lower extremity venous Doppler.    COMPARISON:  No comparison.    HISTORY:  Positive PE, concern for DVT.    TECHNIQUE:  High-resolution gray scale imaging, color flow Doppler, compression were  performed from the groin to the calf of the bilateral lower extremities.  Augmentation was performed of the bilateral common femoral vein and popliteal  vein.    FINDINGS:  There is DVT within both popliteal veins.  The remaining deep veins appear  patent with normal compressibility.    There is a complex collection in the left popliteal fossa containing shadowing  calcification or two calcifications.      Impression:      1.  Bilateral DVT within the popliteal veins.    2.  Complex cystic area in the left popliteal fossa measuring up to 6.2 cm.  This may represent a complex Baker's cyst with loose bodies or calcifications.    US Renal Bilateral [[id number]] Collected: 04/26/23 0820     Updated: 04/26/23 1208    Narrative:      HISTORY:  Urinary retention.    COMPARISON:  None    TECHNIQUE:  Real-time ultrasound imaging and color Doppler  used to evaluate the kidneys and  bladder.    FINDINGS:  The right kidney measures 13.1 x 5.9 x 4.5 cm. The left kidney measures 13 x 5.8  x 6.5 cm.  Cortical thickness and echotexture are within normal limits. There is  no hydronephrosis or evidence of mass. No calculus was seen.  Lower pole cyst on  the right measuring 4.9 x 4.1 x 4.6 cm.  This appears to be an anechoic  partially exophytic cyst.    The bladder was collapsed around a Colbert catheter.      Impression:      A 5 cm lower pole right renal cyst appears simple.  No acute sonographic  abnormality.      CT Angiogram Chest [[id number]] Collected: 04/26/23 0831     Updated: 04/26/23 1137    Narrative:      EXAM:  CTA chest with contrast, PE protocol.    COMPARISON:  CTA chest PE protocol, 04/24/2023.    HISTORY:  Pulmonary embolism suspected, unknown D-dimer.  Shortness of breath.    TECHNIQUE:  Intravenous contrast was administered and axial images from the thoracic inlet  through the diaphragm were performed followed by 2D multiplanar reformats.  MIPS  were reconstructed and reviewed.    FINDINGS:  Limited by respiratory motion artifact.    Mild bilateral dependent atelectasis.  Lungs are otherwise clear.  No  consolidation.    Trace bilateral pleural effusions.  Esophagus is collapsed and poorly evaluated.  Mild bilateral gynecomastia.  There is some mild lymphadenopathy suspected in  the upper mediastinum, nonspecific.  No lymphadenopathy identified elsewhere in  the chest by size criteria.    Extensive bilateral pulmonary emboli with saddle embolus extending across the  bifurcation of the main pulmonary artery.  There is extensive embolic disease  throughout all lobes of both lungs. The Main  pulmonary artery is enlarged  measuring up to 4.6 cm in diameter.  There is abnormal flattening or even  leftward convexity of the intraventricular septum.    Limited evaluation of the upper abdomen due to motion.  No gross acute osseous  abnormality.       Impression:      1.  Extensive bilateral pulmonary emboli with saddle embolus extending across  the main pulmonary artery bifurcation.  There is enlargement of the main  pulmonary artery as well as leftward bowing or convexity of the intraventricular  septum.  These findings may be seen in the setting of right heart strain.    2.  The exam is otherwise limited by motion.  There is mild dependent  atelectasis.  Lungs are otherwise grossly clear.    3.  Mild lymphadenopathy in the upper mediastinum, nonspecific.  Consider  reevaluation on short-term follow-up CT chest.        XR Chest 1 View [[id number]] Collected: 04/25/23 0744     Updated: 04/25/23 0902    Narrative:      HISTORY:  Post code.    COMPARISON:  4/23/2023    FINDINGS:  AP view of the chest demonstrates clear lungs and mild cardiomegaly.  There is  no pneumothorax.  Left-sided AICD is unchanged.      Impression:      Mild cardiomegaly.      CT Angiogram Chest [[id number]] Collected: 04/24/23 0511     Updated: 04/24/23 0658    Narrative:      INDICATION:  Positive D-dimer    COMPARISON:  None    TECHNIQUE:  Volumetric CT scan of the chest, from the thoracic inlet to the level of the  diaphragms, with intravenous contrast.  2D axial, sagittal, and coronal  reformats were performed.  MIPS were performed on a separate workstation and  reviewed.    FINDINGS:  Lungs: No focal consolidation or mass.  Pleura: No effusion or pneumothorax.  Vessels: Thoracic aorta is normal in caliber.  Bones: No suspicious lesions.  Visualized upper abdomen: Unremarkable.      Impression:      I suspect there are bilateral pulmonary emboli, but it is difficult to tell with  certainty due to suboptimal contrast timing and significant motion artifact.  Repeat examination is recommended.    XR Chest 1 View [[id number]] Collected: 04/23/23 1630     Updated: 04/23/23 1648    Narrative:      HISTORY:  Shortness of breath.    FINDINGS:  Frontal film of the chest was obtained.  There is a  dual-lead pacing system in  place via the left subclavian venous approach.  The pacing leads appear to be in  good position.  There is mild pulmonary vascular congestion with mild  cardiomegaly.  No infiltrate or effusion is seen.      Impression:      1.  Mild cardiomegaly with mild pulmonary vascular congestion.    2.  No acute infiltrate or effusion is seen.         No results found for: ACANTHNAEG, AFBCX, BPERTUSSISCX, BLOODCX  No results found for: BCIDPCR, CXREFLEX, CSFCX, CULTURETIS  No results found for: CULTURES, HSVCX, URCX  No results found for: EYECULTURE, GCCX, HSVCULTURE, LABHSV  No results found for: LEGIONELLA, MRSACX, MUMPSCX, MYCOPLASCX  No results found for: NOCARDIACX, STOOLCX  No results found for: THROATCX, UNSTIMCULT, URINECX, CULTURE, VZVCULTUR  No results found for: VIRALCULTU, WOUNDCX  Medication Reviewed and Will Continue Unless Documented in Plan:   !Vancomycin Level Draw Needed, , Does not apply, Once  ascorbic acid, 1,000 mg, Oral, Nightly  cetirizine, 10 mg, Oral, Daily  vitamin D3, 5,000 Units, Oral, Nightly  docusate sodium, 200 mg, Oral, BID  dronedarone, 400 mg, Oral, Daily With Dinner  fondaparinux, 10 mg, Subcutaneous, Q24H  glipizide, 10 mg, Oral, QAM AC  Insulin Aspart, 2-12 Units, Subcutaneous, 4x Daily With Meals & Nightly  magnesium oxide, 400 mg, Oral, BID  metoprolol succinate XL, 25 mg, Oral, BID  senna-docusate sodium, 2 tablet, Oral, BID  sodium bicarbonate, 650 mg, Oral, TID  sodium chloride, 3 mL, Intravenous, Q12H  sodium chloride, 100 mL/hr, Intravenous, Once  tamsulosin, 0.4 mg, Oral, BID  vancomycin, 1,250 mg, Intravenous, Q12H      Pharmacy to dose vancomycin,       •  acetaminophen  •  bisacodyl  •  calcium carbonate  •  fluticasone  •  hydrALAZINE  •  hydrOXYzine pamoate  •  muscle rub  •  ondansetron  •  oxyCODONE-acetaminophen  •  Pharmacy to dose vancomycin  •  simethicone  •  sodium chloride  •  traZODone  Pharmacy to dose vancomycin,        Dietary Orders  (From admission, onward)     Start     Ordered    04/27/23 1718  Diet: Diabetic Diets, Cardiac Diets; Healthy Heart (2-3 Na+); Consistent Carbohydrate; Texture: Regular Texture (IDDSI 7); Fluid Consistency: Thin (IDDSI 0)  Diet Effective Now        References:    Diet Order Crosswalk   Question Answer Comment   Diets: Diabetic Diets    Diets: Cardiac Diets    Cardiac Diet: Healthy Heart (2-3 Na+)    Diabetic Diet: Consistent Carbohydrate    Texture: Regular Texture (IDDSI 7)    Fluid Consistency: Thin (IDDSI 0)        04/27/23 1718              History, physical exam, assessment and plan may have been partly or fully copied from before, but  changes made to the copied record to reflect care on the date of service. Part of the lab and imaging  reports auto populated and corrected. Some of this note may be an electronic transcription of spoken  language to printed text. This may permit erroneous, or at times, nonsensical words or phrases to be  inadvertently transcribed. Although I have reviewed the note for such errors, some may still exist.      This document has been electronically signed by Charlie Persaud MD on May 5, 2023 09:47 CDT

## 2023-05-05 NOTE — PROGRESS NOTES
Subjective     Emre Lisa was seen in follow-up.  Feels well.  No bleeding.  Leg swelling improving.    Past Medical History, Past Surgical History, Social History, Family History have been reviewed and are without significant changes except as mentioned.    Review of Systems   Constitutional: Positive for activity change and fatigue. Negative for fever and unexpected weight change.   HENT: Negative for congestion, mouth sores, sore throat and voice change.    Respiratory: Negative for cough, shortness of breath and wheezing.    Cardiovascular: Positive for leg swelling. Negative for chest pain and palpitations.   Gastrointestinal: Negative for abdominal pain, constipation, diarrhea, nausea and vomiting.   Psychiatric/Behavioral: Negative for agitation and decreased concentration. The patient is not nervous/anxious.             Medications:  The current medication list was reviewed in the EMR    ALLERGIES:    Allergies   Allergen Reactions   • Heparin Other (See Comments)     Heparin induce thrombocytopenia    • Januvia [Sitagliptin] Hives   • Lipitor [Atorvastatin] Other (See Comments)     Muscle Cramps...   • Shellfish Allergy Other (See Comments)   • Sulfa Antibiotics Rash       Objective      Vitals:    05/04/23 0145 05/04/23 1650 05/05/23 0051 05/05/23 0820   Pulse: 90 89 83 90   Weight:              View : No data to display.                Physical Exam  Vitals and nursing note reviewed.   Constitutional:       Appearance: He is obese.   Cardiovascular:      Rate and Rhythm: Normal rate and regular rhythm.   Pulmonary:      Effort: Pulmonary effort is normal. No respiratory distress.      Breath sounds: Normal breath sounds.   Abdominal:      General: There is no distension.      Palpations: Abdomen is soft. There is no mass.      Tenderness: There is no abdominal tenderness.   Musculoskeletal:      Right lower leg: Edema present.      Left lower leg: Edema present.   Skin:     General: Skin is  dry.      Coloration: Skin is pale.      Findings: Bruising present.   Neurological:      General: No focal deficit present.      Mental Status: He is alert and oriented to person, place, and time. Mental status is at baseline.              RECENT LABS:Independently reviewed and summarized  Hematology WBC   Date Value Ref Range Status   05/05/2023 4.07 3.40 - 10.80 10*3/mm3 Final     RBC   Date Value Ref Range Status   05/05/2023 3.33 (L) 4.14 - 5.80 10*6/mm3 Final     Hemoglobin   Date Value Ref Range Status   05/05/2023 9.5 (L) 13.0 - 17.7 g/dL Final     Hematocrit   Date Value Ref Range Status   05/05/2023 30.6 (L) 37.5 - 51.0 % Final     Platelets   Date Value Ref Range Status   05/05/2023 169 140 - 450 10*3/mm3 Final       Lab Results   Component Value Date    GLUCOSE 186 (H) 05/05/2023    BUN 20 05/05/2023    CREATININE 1.01 05/05/2023    EGFR 84.6 05/05/2023    BCR 19.8 05/05/2023    K 4.5 05/05/2023    CO2 25.0 05/05/2023    CALCIUM 8.7 05/05/2023    PROTENTOTREF 6.3 04/28/2023    ALBUMIN 3.3 (L) 05/05/2023    BILITOT 0.6 05/05/2023    AST 21 05/05/2023    ALT 78 (H) 05/05/2023            Assessment & Plan          (1) HIT (2) Acute bilateral PE with saddle embolus (3) Bilateral DVT       Patient currently on fondaparinux.  Platelet count has normalized.  No bleeding.  Shortness of breath is improved.  Leg swelling also improving.  Recommend at least 6 months of anticoagulation.  Upon discharge switch him to Eliquis or Xarelto.  Continue fondaparinux while he is in the hospital as additional surgeries are still on the table.    Call hematology if any other questions or concerns.    Arrange outpatient follow-up with hematology 1 month after discharge.      5/5/2023      CC:

## 2023-05-06 ENCOUNTER — OUTSIDE FACILITY SERVICE (OUTPATIENT)
Dept: PULMONOLOGY | Facility: CLINIC | Age: 63
End: 2023-05-06
Payer: MEDICARE

## 2023-05-06 LAB
ALBUMIN SERPL-MCNC: 3.2 G/DL (ref 3.5–5.2)
ALBUMIN/GLOB SERPL: 1 G/DL
ALP SERPL-CCNC: 123 U/L (ref 39–117)
ALT SERPL W P-5'-P-CCNC: 63 U/L (ref 1–41)
ANION GAP SERPL CALCULATED.3IONS-SCNC: 12 MMOL/L (ref 5–15)
AST SERPL-CCNC: 20 U/L (ref 1–40)
BASOPHILS # BLD AUTO: 0.04 10*3/MM3 (ref 0–0.2)
BASOPHILS NFR BLD AUTO: 0.9 % (ref 0–1.5)
BILIRUB SERPL-MCNC: 0.6 MG/DL (ref 0–1.2)
BUN SERPL-MCNC: 20 MG/DL (ref 8–23)
BUN/CREAT SERPL: 20 (ref 7–25)
CALCIUM SPEC-SCNC: 8.6 MG/DL (ref 8.6–10.5)
CHLORIDE SERPL-SCNC: 103 MMOL/L (ref 98–107)
CO2 SERPL-SCNC: 21 MMOL/L (ref 22–29)
CREAT SERPL-MCNC: 1 MG/DL (ref 0.76–1.27)
DEPRECATED RDW RBC AUTO: 48.7 FL (ref 37–54)
EGFRCR SERPLBLD CKD-EPI 2021: 85.6 ML/MIN/1.73
EOSINOPHIL # BLD AUTO: 0.17 10*3/MM3 (ref 0–0.4)
EOSINOPHIL NFR BLD AUTO: 3.8 % (ref 0.3–6.2)
ERYTHROCYTE [DISTWIDTH] IN BLOOD BY AUTOMATED COUNT: 14.7 % (ref 12.3–15.4)
GLOBULIN UR ELPH-MCNC: 3.3 GM/DL
GLUCOSE BLDC GLUCOMTR-MCNC: 150 MG/DL (ref 70–130)
GLUCOSE BLDC GLUCOMTR-MCNC: 215 MG/DL (ref 70–130)
GLUCOSE BLDC GLUCOMTR-MCNC: 238 MG/DL (ref 70–130)
GLUCOSE SERPL-MCNC: 165 MG/DL (ref 65–99)
HCT VFR BLD AUTO: 30.9 % (ref 37.5–51)
HGB BLD-MCNC: 9.9 G/DL (ref 13–17.7)
IMM GRANULOCYTES # BLD AUTO: 0.02 10*3/MM3 (ref 0–0.05)
IMM GRANULOCYTES NFR BLD AUTO: 0.4 % (ref 0–0.5)
LYMPHOCYTES # BLD AUTO: 1.1 10*3/MM3 (ref 0.7–3.1)
LYMPHOCYTES NFR BLD AUTO: 24.5 % (ref 19.6–45.3)
MAGNESIUM SERPL-MCNC: 2.1 MG/DL (ref 1.6–2.4)
MCH RBC QN AUTO: 28.9 PG (ref 26.6–33)
MCHC RBC AUTO-ENTMCNC: 32 G/DL (ref 31.5–35.7)
MCV RBC AUTO: 90.4 FL (ref 79–97)
MONOCYTES # BLD AUTO: 0.51 10*3/MM3 (ref 0.1–0.9)
MONOCYTES NFR BLD AUTO: 11.4 % (ref 5–12)
NEUTROPHILS NFR BLD AUTO: 2.65 10*3/MM3 (ref 1.7–7)
NEUTROPHILS NFR BLD AUTO: 59 % (ref 42.7–76)
NRBC BLD AUTO-RTO: 0 /100 WBC (ref 0–0.2)
PLATELET # BLD AUTO: 169 10*3/MM3 (ref 140–450)
PMV BLD AUTO: 10.6 FL (ref 6–12)
POTASSIUM SERPL-SCNC: 4.4 MMOL/L (ref 3.5–5.2)
PROT SERPL-MCNC: 6.5 G/DL (ref 6–8.5)
RBC # BLD AUTO: 3.42 10*6/MM3 (ref 4.14–5.8)
SODIUM SERPL-SCNC: 136 MMOL/L (ref 136–145)
WBC NRBC COR # BLD: 4.49 10*3/MM3 (ref 3.4–10.8)

## 2023-05-06 PROCEDURE — 82948 REAGENT STRIP/BLOOD GLUCOSE: CPT

## 2023-05-06 PROCEDURE — 83735 ASSAY OF MAGNESIUM: CPT | Performed by: INTERNAL MEDICINE

## 2023-05-06 PROCEDURE — 25010000002 FONDAPARINUX PER 0.5 MG: Performed by: INTERNAL MEDICINE

## 2023-05-06 PROCEDURE — 85025 COMPLETE CBC W/AUTO DIFF WBC: CPT | Performed by: INTERNAL MEDICINE

## 2023-05-06 PROCEDURE — 25010000002 VANCOMYCIN 10 G RECONSTITUTED SOLUTION: Performed by: INTERNAL MEDICINE

## 2023-05-06 PROCEDURE — 80053 COMPREHEN METABOLIC PANEL: CPT | Performed by: INTERNAL MEDICINE

## 2023-05-06 RX ORDER — FONDAPARINUX SODIUM 10 MG/.8ML
10 INJECTION SUBCUTANEOUS
Status: DISCONTINUED | OUTPATIENT
Start: 2023-05-07 | End: 2023-05-13

## 2023-05-06 NOTE — PROGRESS NOTES
Pharmacokinetics by Pharmacy - Vancomycin    Emre Lisa is a 63 y.o. male receiving vancomycin 1250 mg IV q12hr (day 23) for bone and/or joint infection.    Objective:    Temp Readings from Last 1 Encounters:   04/21/23 98.6 °F (37 °C)     WBC   Date Value Ref Range Status   05/06/2023 4.49 3.40 - 10.80 10*3/mm3 Final   05/05/2023 4.07 3.40 - 10.80 10*3/mm3 Final   05/04/2023 4.36 3.40 - 10.80 10*3/mm3 Final    No results found for: CRP, LACTATE   Creatinine   Date Value Ref Range Status   05/06/2023 1.00 0.76 - 1.27 mg/dL Final   05/05/2023 1.01 0.76 - 1.27 mg/dL Final   05/04/2023 1.04 0.76 - 1.27 mg/dL Final       Vancomycin Peak   Date Value Ref Range Status   05/05/2023 22.60 20.00 - 40.00 mcg/mL Final     Vancomycin Trough   Date Value Ref Range Status   05/05/2023 13.80 5.00 - 20.00 mcg/mL Final       Culture Results:  Microbiology Results (last 10 days)     ** No results found for the last 240 hours. **        No results found for: RESPCX    [Ht:  ; Wt: (!) 170 kg (375 lb 11.2 oz)]   Body mass index is 50.95 kg/m².  Ideal body weight: 77.6 kg (171 lb 1.2 oz)  Adjusted ideal body weight: 115 kg (252 lb 14.8 oz)      Assessment:  • WBCs WNL  • Creatinine 1 mcg/mL and WNL  • Vancomycin peak and trough resulted at 22.6 mcg/mL and 13.8 mcg/mL, respectively. These give a calculated AUC and trough of 444 and 13.9 mcg/mL, respectively. These are within goal range.   • AUC and trough goals are 400-600 and 10-20 mcg/mL, respectively.     Plan:  1. Continue Vancomycin 1250 mg IV q12hrs  2. Vancomycin levels when clinically indicated. Consulted until 5/26.  3. Pharmacy will monitor renal function and adjust dose accordingly.    Thank you for this consult.     Estiven Cannon [facility]   05/06/23 09:45 CDT

## 2023-05-06 NOTE — ACP (ADVANCE CARE PLANNING)
[facility]  INPATIENT PROGRESS NOTE    PATIENT NAME: Emre Lisa      PHYSICIAN: Charlie Persaud MD  : 1960        MRN: [id number]  Patient Care Team:  Jamaal Bravo MD as PCP - Marco A Chauhan DPM as Consulting Physician (Podiatry)    Chief Complaint:  Continued antibiotic therapy and medical management  History of Present Illness: Patient is a 64 y/o male with a pmh of type 2 DM, hypertension, CAD, and neuropathy.  He recently underwent surgical repair of Charcot deformity with external fixator stabilization to his left foot related to cellulitis.  He tolerated the surgical procedure well.  He will need IV antibiotic therapy through 23 due to bone/joint infection.  He remains non-weight bearing of left lower extremity.  He tells me the pain to his left foot remains controlled, however, he does have a torn left rotator cuff and arthritis so his hip and shoulder are the source of his pain.  He tells me he is doing well and he has no other complaints or concerns.  I have reviewed the patients most recent labs and diagnostics independently.  Glucose 124, BUN 15, creatinine 0.89, sodium 138, potassium 4.2, chloride 105, co2 24, wbc 5.25, hgb 9.9, hct 30.1, platelets 156.       Assessment      Cellulitis of left foot  Charcot's joint of left foot  Type 2 DM  Arthritis  A-fib  Hypertension  Rheumatoid arthritis  DVT & GI prophylaxis     DVT Prophylaxis: Heparin  GI Prophylaxis: Protonix  Code Status: Full    Plan     -Stable at time of exam  -Labs continue to improve and remain stable  -Decreased edema to ble  -Continues to tolerate oxygen weaning without difficulty  -Continue fondaparinux.  -Hem/Onc signed off.  Will contact with any further questions/concerns.  -Continue with oxygen supplementation, wean as tolerated.  -Continue therapy we will try to get patient out of bed to chair as tolerated.  -All specialists and  consultants follow-up appreciated.  -Patient remains clinically stable and improved.  -DVT and GI prophylaxis in place.  -We'll continue monitoring patient in hospital setting and treat patient as course dictates.  -Please review orders for detailed plan of care.  -For Acute and Chronic medical condition we will continue medications described below in medication section which have been reviewed and we will continue unless changed in plan of care.  -Laboratory and diagnostic studies have been independently reviewed and the reports are reviewed as documented below.      Subjective   Interval History:   Patient Complaints:   Patient seen and examined.  Resting comfortable in bed.  Denies any pain.  No concerns at this time.  No overnight events reported.   History taken from: Patient, patient's chart.    Review of Systems:    Review of Systems   Constitutional: Positive for activity change. Negative for chills and fever.   HENT: Negative for postnasal drip and tinnitus.    Respiratory: Negative for apnea.    Cardiovascular: Negative for chest pain and palpitations.   Gastrointestinal: Negative for abdominal distention, nausea and vomiting.   Musculoskeletal: Positive for arthralgias and myalgias.   Skin: Positive for wound.   Neurological: Negative for tremors and seizures.   Psychiatric/Behavioral: Negative for agitation, confusion and hallucinations.       Objective   Vital Signs:  Temp: 96.9 F         Heart Rate: 86         Resp: 18          Blood Pressure: 119/25         Pulse Ox: 91%    Physical Exam:   Physical Exam  Vitals reviewed.   Constitutional:       Appearance: Normal appearance. He is not ill-appearing.   HENT:      Head: Normocephalic and atraumatic.      Mouth/Throat:      Mouth: Mucous membranes are moist.      Pharynx: Oropharynx is clear.   Eyes:      Extraocular Movements: Extraocular movements intact.      Pupils: Pupils are equal, round, and reactive to light.   Neck:      Vascular: No carotid  bruit.   Cardiovascular:      Rate and Rhythm: Regular rhythm. Tachycardia present.      Pulses: Normal pulses.      Heart sounds: Normal heart sounds. No murmur heard.    No gallop.   Pulmonary:      Effort: Pulmonary effort is normal. No respiratory distress.      Breath sounds: Normal breath sounds. No rales.   Abdominal:      General: Bowel sounds are normal. There is no distension.      Palpations: Abdomen is soft.      Tenderness: There is no guarding.   Musculoskeletal:         General: Normal range of motion.      Cervical back: Normal range of motion and neck supple.      Right lower leg: Edema present.      Left lower leg: Edema present.   Skin:     General: Skin is warm and dry.      Capillary Refill: Capillary refill takes less than 2 seconds.      Findings: Bruising present.      Comments: S/P L surgical repair Charcot foot with external fixator.  Wound vac in place   Neurological:      General: No focal deficit present.      Mental Status: He is alert and oriented to person, place, and time.   Psychiatric:         Mood and Affect: Mood normal.         Behavior: Behavior normal.         Thought Content: Thought content normal.         Judgment: Judgment normal.       Results Review:       Results from last 7 days   Lab Units 05/06/23  0621 05/05/23  0624 05/04/23  0625 05/03/23  0647 05/02/23  0625 05/01/23  0540 04/30/23  0625   SODIUM mmol/L 136 137 136 136 134* 134* 135*   POTASSIUM mmol/L 4.4 4.5 4.4 4.6 4.5 4.5 4.3   CHLORIDE mmol/L 103 101 102 101 102 100 101   CO2 mmol/L 21.0* 25.0 24.0 25.0 25.0 23.0 22.0   BUN mg/dL 20 20 19 18 22 22 23   CREATININE mg/dL 1.00 1.01 1.04 1.14 1.09 1.08 1.06   GLUCOSE mg/dL 165* 186* 193* 169* 169* 172* 183*   CALCIUM mg/dL 8.6 8.7 8.3* 8.3* 8.2* 8.1* 8.0*   BILIRUBIN mg/dL 0.6 0.6 0.8 0.9 1.0 0.9 1.3*   ALK PHOS U/L 123* 132* 151* 168* 201* 204* 192*   ALT (SGPT) U/L 63* 78* 103* 139* 184* 249* 346*   AST (SGOT) U/L 20 21 25 29 39 74* 84*     Results from last 7  days   Lab Units 05/06/23  0621 05/05/23  0624 05/04/23  0625 05/03/23  0647 05/02/23  0625 05/01/23  0540 04/30/23  0625   MAGNESIUM mg/dL 2.1 2.0 2.0 2.1 2.2 2.1 2.1     Results from last 7 days   Lab Units 05/06/23  0621 05/05/23  0624 05/04/23  0625 05/03/23  0647 05/02/23  0625 05/01/23  0540 04/30/23  0625   WBC 10*3/mm3 4.49 4.07 4.36 4.92 5.92 6.43 6.73   HEMOGLOBIN g/dL 9.9* 9.5* 9.4* 9.2* 9.2* 8.7* 9.3*   HEMATOCRIT % 30.9* 30.6* 30.4* 28.9* 29.9* 26.9* 29.2*   PLATELETS 10*3/mm3 169 169 142 125* 120* 97* 77*     Lab Results   Component Value Date    CKTOTAL 119 04/23/2023    CKMB 2.16 04/23/2023    TROPONINI <0.012 11/19/2014    TROPONINT 52 (H) 04/25/2023     CO2   Date Value Ref Range Status   05/06/2023 21.0 (L) 22.0 - 29.0 mmol/L Final         Imaging Results (Most Recent)     Procedure Component Value Units Date/Time    US Venous Doppler Lower Extremity Bilateral (duplex) [[id number]] Collected: 04/28/23 2142     Updated: 04/28/23 2154    Narrative:      EXAMINATION:  Ultrasound bilateral lower extremity venous Doppler.    COMPARISON:  No comparison.    HISTORY:  Positive PE, concern for DVT.    TECHNIQUE:  High-resolution gray scale imaging, color flow Doppler, compression were  performed from the groin to the calf of the bilateral lower extremities.  Augmentation was performed of the bilateral common femoral vein and popliteal  vein.    FINDINGS:  There is DVT within both popliteal veins.  The remaining deep veins appear  patent with normal compressibility.    There is a complex collection in the left popliteal fossa containing shadowing  calcification or two calcifications.      Impression:      1.  Bilateral DVT within the popliteal veins.    2.  Complex cystic area in the left popliteal fossa measuring up to 6.2 cm.  This may represent a complex Baker's cyst with loose bodies or calcifications.    US Renal Bilateral [[id number]] Collected: 04/26/23 0820     Updated: 04/26/23 1208    Narrative:       HISTORY:  Urinary retention.    COMPARISON:  None    TECHNIQUE:  Real-time ultrasound imaging and color Doppler used to evaluate the kidneys and  bladder.    FINDINGS:  The right kidney measures 13.1 x 5.9 x 4.5 cm. The left kidney measures 13 x 5.8  x 6.5 cm.  Cortical thickness and echotexture are within normal limits. There is  no hydronephrosis or evidence of mass. No calculus was seen.  Lower pole cyst on  the right measuring 4.9 x 4.1 x 4.6 cm.  This appears to be an anechoic  partially exophytic cyst.    The bladder was collapsed around a Colbert catheter.      Impression:      A 5 cm lower pole right renal cyst appears simple.  No acute sonographic  abnormality.      CT Angiogram Chest [[id number]] Collected: 04/26/23 0831     Updated: 04/26/23 1137    Narrative:      EXAM:  CTA chest with contrast, PE protocol.    COMPARISON:  CTA chest PE protocol, 04/24/2023.    HISTORY:  Pulmonary embolism suspected, unknown D-dimer.  Shortness of breath.    TECHNIQUE:  Intravenous contrast was administered and axial images from the thoracic inlet  through the diaphragm were performed followed by 2D multiplanar reformats.  MIPS  were reconstructed and reviewed.    FINDINGS:  Limited by respiratory motion artifact.    Mild bilateral dependent atelectasis.  Lungs are otherwise clear.  No  consolidation.    Trace bilateral pleural effusions.  Esophagus is collapsed and poorly evaluated.  Mild bilateral gynecomastia.  There is some mild lymphadenopathy suspected in  the upper mediastinum, nonspecific.  No lymphadenopathy identified elsewhere in  the chest by size criteria.    Extensive bilateral pulmonary emboli with saddle embolus extending across the  bifurcation of the main pulmonary artery.  There is extensive embolic disease  throughout all lobes of both lungs. The Main  pulmonary artery is enlarged  measuring up to 4.6 cm in diameter.  There is abnormal flattening or even  leftward convexity of the intraventricular  septum.    Limited evaluation of the upper abdomen due to motion.  No gross acute osseous  abnormality.      Impression:      1.  Extensive bilateral pulmonary emboli with saddle embolus extending across  the main pulmonary artery bifurcation.  There is enlargement of the main  pulmonary artery as well as leftward bowing or convexity of the intraventricular  septum.  These findings may be seen in the setting of right heart strain.    2.  The exam is otherwise limited by motion.  There is mild dependent  atelectasis.  Lungs are otherwise grossly clear.    3.  Mild lymphadenopathy in the upper mediastinum, nonspecific.  Consider  reevaluation on short-term follow-up CT chest.        XR Chest 1 View [[id number]] Collected: 04/25/23 0744     Updated: 04/25/23 0902    Narrative:      HISTORY:  Post code.    COMPARISON:  4/23/2023    FINDINGS:  AP view of the chest demonstrates clear lungs and mild cardiomegaly.  There is  no pneumothorax.  Left-sided AICD is unchanged.      Impression:      Mild cardiomegaly.      CT Angiogram Chest [[id number]] Collected: 04/24/23 0511     Updated: 04/24/23 0658    Narrative:      INDICATION:  Positive D-dimer    COMPARISON:  None    TECHNIQUE:  Volumetric CT scan of the chest, from the thoracic inlet to the level of the  diaphragms, with intravenous contrast.  2D axial, sagittal, and coronal  reformats were performed.  MIPS were performed on a separate workstation and  reviewed.    FINDINGS:  Lungs: No focal consolidation or mass.  Pleura: No effusion or pneumothorax.  Vessels: Thoracic aorta is normal in caliber.  Bones: No suspicious lesions.  Visualized upper abdomen: Unremarkable.      Impression:      I suspect there are bilateral pulmonary emboli, but it is difficult to tell with  certainty due to suboptimal contrast timing and significant motion artifact.  Repeat examination is recommended.    XR Chest 1 View [[id number]] Collected: 04/23/23 1630     Updated: 04/23/23 1648     Narrative:      HISTORY:  Shortness of breath.    FINDINGS:  Frontal film of the chest was obtained.  There is a dual-lead pacing system in  place via the left subclavian venous approach.  The pacing leads appear to be in  good position.  There is mild pulmonary vascular congestion with mild  cardiomegaly.  No infiltrate or effusion is seen.      Impression:      1.  Mild cardiomegaly with mild pulmonary vascular congestion.    2.  No acute infiltrate or effusion is seen.         No results found for: ACANTHNAEG, AFBCX, BPERTUSSISCX, BLOODCX  No results found for: BCIDPCR, CXREFLEX, CSFCX, CULTURETIS  No results found for: CULTURES, HSVCX, URCX  No results found for: EYECULTURE, GCCX, HSVCULTURE, LABHSV  No results found for: LEGIONELLA, MRSACX, MUMPSCX, MYCOPLASCX  No results found for: NOCARDIACX, STOOLCX  No results found for: THROATCX, UNSTIMCULT, URINECX, CULTURE, VZVCULTUR  No results found for: VIRALCULTU, WOUNDCX  Medication Reviewed and Will Continue Unless Documented in Plan:   ascorbic acid, 1,000 mg, Oral, Nightly  cetirizine, 10 mg, Oral, Daily  vitamin D3, 5,000 Units, Oral, Nightly  docusate sodium, 200 mg, Oral, BID  dronedarone, 400 mg, Oral, Daily With Dinner  fluticasone, 2 spray, Each Nare, Daily  fondaparinux, 10 mg, Subcutaneous, Q24H  glipizide, 10 mg, Oral, QAM AC  Insulin Aspart, 2-12 Units, Subcutaneous, 4x Daily With Meals & Nightly  magnesium oxide, 400 mg, Oral, BID  metoprolol succinate XL, 25 mg, Oral, BID  senna-docusate sodium, 2 tablet, Oral, BID  sodium bicarbonate, 650 mg, Oral, TID  sodium chloride, 3 mL, Intravenous, Q12H  sodium chloride, 100 mL/hr, Intravenous, Once  tamsulosin, 0.4 mg, Oral, BID  vancomycin, 1,250 mg, Intravenous, Q12H      Pharmacy to dose vancomycin,       •  acetaminophen  •  bisacodyl  •  calcium carbonate  •  hydrALAZINE  •  hydrOXYzine pamoate  •  muscle rub  •  ondansetron  •  oxyCODONE-acetaminophen  •  Pharmacy to dose vancomycin  •  simethicone  •   sodium chloride  •  traZODone  Pharmacy to dose vancomycin,        Dietary Orders (From admission, onward)     Start     Ordered    04/27/23 1718  Diet: Diabetic Diets, Cardiac Diets; Healthy Heart (2-3 Na+); Consistent Carbohydrate; Texture: Regular Texture (IDDSI 7); Fluid Consistency: Thin (IDDSI 0)  Diet Effective Now        References:    Diet Order [place]   Question Answer Comment   Diets: Diabetic Diets    Diets: Cardiac Diets    Cardiac Diet: Healthy Heart (2-3 Na+)    Diabetic Diet: Consistent Carbohydrate    Texture: Regular Texture (IDDSI 7)    Fluid Consistency: Thin (IDDSI 0)        04/27/23 1718              History, physical exam, assessment and plan may have been partly or fully copied from before, but  changes made to the copied record to reflect care on the date of service. Part of the lab and imaging  reports auto populated and corrected. Some of this note may be an electronic transcription of spoken  language to printed text. This may permit erroneous, or at times, nonsensical words or phrases to be  inadvertently transcribed. Although I have reviewed the note for such errors, some may still exist.      This document has been electronically signed by Charlie Persaud MD on May 6, 2023 12:38 CDT

## 2023-05-07 ENCOUNTER — OUTSIDE FACILITY SERVICE (OUTPATIENT)
Dept: PULMONOLOGY | Facility: CLINIC | Age: 63
End: 2023-05-07
Payer: MEDICARE

## 2023-05-07 LAB
ALBUMIN SERPL-MCNC: 3.5 G/DL (ref 3.5–5.2)
ALBUMIN/GLOB SERPL: 1.1 G/DL
ALP SERPL-CCNC: 117 U/L (ref 39–117)
ALT SERPL W P-5'-P-CCNC: 52 U/L (ref 1–41)
ANION GAP SERPL CALCULATED.3IONS-SCNC: 11 MMOL/L (ref 5–15)
AST SERPL-CCNC: 30 U/L (ref 1–40)
BASOPHILS # BLD AUTO: 0.02 10*3/MM3 (ref 0–0.2)
BASOPHILS NFR BLD AUTO: 0.4 % (ref 0–1.5)
BILIRUB SERPL-MCNC: 0.6 MG/DL (ref 0–1.2)
BUN SERPL-MCNC: 23 MG/DL (ref 8–23)
BUN/CREAT SERPL: 18.5 (ref 7–25)
CALCIUM SPEC-SCNC: 8.7 MG/DL (ref 8.6–10.5)
CHLORIDE SERPL-SCNC: 104 MMOL/L (ref 98–107)
CO2 SERPL-SCNC: 25 MMOL/L (ref 22–29)
CREAT SERPL-MCNC: 1.24 MG/DL (ref 0.76–1.27)
DEPRECATED RDW RBC AUTO: 50.1 FL (ref 37–54)
EGFRCR SERPLBLD CKD-EPI 2021: 66.1 ML/MIN/1.73
EOSINOPHIL # BLD AUTO: 0.19 10*3/MM3 (ref 0–0.4)
EOSINOPHIL NFR BLD AUTO: 4.2 % (ref 0.3–6.2)
ERYTHROCYTE [DISTWIDTH] IN BLOOD BY AUTOMATED COUNT: 15 % (ref 12.3–15.4)
GLOBULIN UR ELPH-MCNC: 3.1 GM/DL
GLUCOSE BLDC GLUCOMTR-MCNC: 150 MG/DL (ref 70–130)
GLUCOSE BLDC GLUCOMTR-MCNC: 173 MG/DL (ref 70–130)
GLUCOSE BLDC GLUCOMTR-MCNC: 177 MG/DL (ref 70–130)
GLUCOSE BLDC GLUCOMTR-MCNC: 186 MG/DL (ref 70–130)
GLUCOSE SERPL-MCNC: 156 MG/DL (ref 65–99)
HCT VFR BLD AUTO: 32.9 % (ref 37.5–51)
HGB BLD-MCNC: 10.2 G/DL (ref 13–17.7)
IMM GRANULOCYTES # BLD AUTO: 0.02 10*3/MM3 (ref 0–0.05)
IMM GRANULOCYTES NFR BLD AUTO: 0.4 % (ref 0–0.5)
LYMPHOCYTES # BLD AUTO: 1.2 10*3/MM3 (ref 0.7–3.1)
LYMPHOCYTES NFR BLD AUTO: 26.4 % (ref 19.6–45.3)
MAGNESIUM SERPL-MCNC: 2 MG/DL (ref 1.6–2.4)
MCH RBC QN AUTO: 28.4 PG (ref 26.6–33)
MCHC RBC AUTO-ENTMCNC: 31 G/DL (ref 31.5–35.7)
MCV RBC AUTO: 91.6 FL (ref 79–97)
MONOCYTES # BLD AUTO: 0.57 10*3/MM3 (ref 0.1–0.9)
MONOCYTES NFR BLD AUTO: 12.6 % (ref 5–12)
NEUTROPHILS NFR BLD AUTO: 2.54 10*3/MM3 (ref 1.7–7)
NEUTROPHILS NFR BLD AUTO: 56 % (ref 42.7–76)
NRBC BLD AUTO-RTO: 0 /100 WBC (ref 0–0.2)
PLATELET # BLD AUTO: 193 10*3/MM3 (ref 140–450)
PMV BLD AUTO: 10.7 FL (ref 6–12)
POTASSIUM SERPL-SCNC: 4.8 MMOL/L (ref 3.5–5.2)
PROT SERPL-MCNC: 6.6 G/DL (ref 6–8.5)
RBC # BLD AUTO: 3.59 10*6/MM3 (ref 4.14–5.8)
SODIUM SERPL-SCNC: 140 MMOL/L (ref 136–145)
WBC NRBC COR # BLD: 4.54 10*3/MM3 (ref 3.4–10.8)

## 2023-05-07 PROCEDURE — 80053 COMPREHEN METABOLIC PANEL: CPT | Performed by: INTERNAL MEDICINE

## 2023-05-07 PROCEDURE — 25010000002 FONDAPARINUX PER 0.5 MG: Performed by: INTERNAL MEDICINE

## 2023-05-07 PROCEDURE — 85025 COMPLETE CBC W/AUTO DIFF WBC: CPT | Performed by: INTERNAL MEDICINE

## 2023-05-07 PROCEDURE — 82948 REAGENT STRIP/BLOOD GLUCOSE: CPT

## 2023-05-07 PROCEDURE — 25010000002 VANCOMYCIN 10 G RECONSTITUTED SOLUTION: Performed by: INTERNAL MEDICINE

## 2023-05-07 PROCEDURE — 83735 ASSAY OF MAGNESIUM: CPT | Performed by: INTERNAL MEDICINE

## 2023-05-07 NOTE — PROGRESS NOTES
Pharmacokinetics by Pharmacy - Vancomycin    Emre Lisa is a 63 y.o. male receiving vancomycin 1250 mg IV q12hr (day 24) for bone and/or joint infection.    Objective:    Temp Readings from Last 1 Encounters:   04/21/23 98.6 °F (37 °C)     WBC   Date Value Ref Range Status   05/07/2023 4.54 3.40 - 10.80 10*3/mm3 Final   05/06/2023 4.49 3.40 - 10.80 10*3/mm3 Final   05/05/2023 4.07 3.40 - 10.80 10*3/mm3 Final    No results found for: CRP, LACTATE   Creatinine   Date Value Ref Range Status   05/07/2023 1.24 0.76 - 1.27 mg/dL Final   05/06/2023 1.00 0.76 - 1.27 mg/dL Final   05/05/2023 1.01 0.76 - 1.27 mg/dL Final       Vancomycin Peak   Date Value Ref Range Status   05/05/2023 22.60 20.00 - 40.00 mcg/mL Final     Vancomycin Trough   Date Value Ref Range Status   05/05/2023 13.80 5.00 - 20.00 mcg/mL Final       Culture Results:  Microbiology Results (last 10 days)     ** No results found for the last 240 hours. **        No results found for: RESPCX    [Ht:  ; Wt: (!) 170 kg (375 lb 11.2 oz)]   Body mass index is 50.95 kg/m².  Ideal body weight: 77.6 kg (171 lb 1.2 oz)  Adjusted ideal body weight: 115 kg (252 lb 14.8 oz)      Assessment:  • WBCs WNL  • Creatinine 1.24 mcg/mL and bumped today. Will monitor.   • Vancomycin WNL on 5/5    • AUC and trough goals are 400-600 and 10-20 mcg/mL, respectively.     Plan:  1. Continue Vancomycin 1250 mg IV q12hrs  2. Vancomycin levels when clinically indicated. Consulted until 5/26.  3. Pharmacy will monitor renal function and adjust dose accordingly.    Thank you for this consult.     Estiven Cannon, [facility]   05/07/23 09:24 CDT

## 2023-05-07 NOTE — ACP (ADVANCE CARE PLANNING)
Formerly [facility] @ [facility]  INPATIENT PROGRESS NOTE    PATIENT NAME: Emre Lisa      PHYSICIAN: Charlie Persaud MD  : 1960        MRN: [id number]  Patient Care Team:  Jamaal Bravo MD as PCP - Marco A Chauhan DPM as Consulting Physician (Podiatry)    Chief Complaint:  Continued antibiotic therapy and medical management  History of Present Illness: Patient is a 64 y/o male with a pmh of type 2 DM, hypertension, CAD, and neuropathy.  He recently underwent surgical repair of Charcot deformity with external fixator stabilization to his left foot related to cellulitis.  He tolerated the surgical procedure well.  He will need IV antibiotic therapy through 23 due to bone/joint infection.  He remains non-weight bearing of left lower extremity.  He tells me the pain to his left foot remains controlled, however, he does have a torn left rotator cuff and arthritis so his hip and shoulder are the source of his pain.  He tells me he is doing well and he has no other complaints or concerns.  I have reviewed the patients most recent labs and diagnostics independently.  Glucose 124, BUN 15, creatinine 0.89, sodium 138, potassium 4.2, chloride 105, co2 24, wbc 5.25, hgb 9.9, hct 30.1, platelets 156.       Assessment      Cellulitis of left foot  Charcot's joint of left foot  Type 2 DM  Arthritis  A-fib  Hypertension  Rheumatoid arthritis  DVT & GI prophylaxis     DVT Prophylaxis: Heparin  GI Prophylaxis: Protonix  Code Status: Full    Plan     -Stable at time of exam  -Labs remain stable  -Tolerating room air  -Continue fondaparinux.  -Continue therapy we will try to get patient out of bed to chair as tolerated.  -All specialists and consultants follow-up appreciated.  -Patient remains clinically stable and improved.  -DVT and GI prophylaxis in place.  -We'll continue monitoring patient in hospital setting and treat patient as course  dictates.  -Please review orders for detailed plan of care.  -For Acute and Chronic medical condition we will continue medications described below in medication section which have been reviewed and we will continue unless changed in plan of care.  -Laboratory and diagnostic studies have been independently reviewed and the reports are reviewed as documented below.      Subjective   Interval History:   Patient Complaints:   Patient seen and examined. Denies any shortness of breath or pain.  No overnight events noted.   History taken from: Patient, patient's chart.    Review of Systems:    Review of Systems   Constitutional: Positive for activity change. Negative for chills and fever.   HENT: Negative for postnasal drip and tinnitus.    Respiratory: Negative for apnea.    Cardiovascular: Negative for chest pain and palpitations.   Gastrointestinal: Negative for abdominal distention, nausea and vomiting.   Musculoskeletal: Positive for arthralgias and myalgias.   Skin: Positive for wound.   Neurological: Negative for tremors and seizures.   Psychiatric/Behavioral: Negative for agitation, confusion and hallucinations.       Objective   Vital Signs:  Temp: 96.6 F         Heart Rate: 82         Resp: 20          Blood Pressure: 128/73         Pulse Ox: 95%    Physical Exam:   Physical Exam  Vitals reviewed.   Constitutional:       Appearance: Normal appearance. He is not ill-appearing.   HENT:      Head: Normocephalic and atraumatic.      Mouth/Throat:      Mouth: Mucous membranes are moist.      Pharynx: Oropharynx is clear.   Eyes:      Extraocular Movements: Extraocular movements intact.      Pupils: Pupils are equal, round, and reactive to light.   Neck:      Vascular: No carotid bruit.   Cardiovascular:      Rate and Rhythm: Regular rhythm. Tachycardia present.      Pulses: Normal pulses.      Heart sounds: Normal heart sounds. No murmur heard.    No gallop.   Pulmonary:      Effort: Pulmonary effort is normal. No  respiratory distress.      Breath sounds: Normal breath sounds. No rales.   Abdominal:      General: Bowel sounds are normal. There is no distension.      Palpations: Abdomen is soft.      Tenderness: There is no guarding.   Musculoskeletal:         General: Normal range of motion.      Cervical back: Normal range of motion and neck supple.      Right lower leg: Edema present.      Left lower leg: Edema present.   Skin:     General: Skin is warm and dry.      Capillary Refill: Capillary refill takes less than 2 seconds.      Findings: Bruising present.      Comments: S/P L surgical repair Charcot foot with external fixator.  Wound vac in place   Neurological:      General: No focal deficit present.      Mental Status: He is alert and oriented to person, place, and time.   Psychiatric:         Mood and Affect: Mood normal.         Behavior: Behavior normal.         Thought Content: Thought content normal.         Judgment: Judgment normal.       Results Review:       Results from last 7 days   Lab Units 05/07/23  0702 05/06/23  0621 05/05/23  0624 05/04/23  0625 05/03/23  0647 05/02/23  0625 05/01/23  0540   SODIUM mmol/L 140 136 137 136 136 134* 134*   POTASSIUM mmol/L 4.8 4.4 4.5 4.4 4.6 4.5 4.5   CHLORIDE mmol/L 104 103 101 102 101 102 100   CO2 mmol/L 25.0 21.0* 25.0 24.0 25.0 25.0 23.0   BUN mg/dL 23 20 20 19 18 22 22   CREATININE mg/dL 1.24 1.00 1.01 1.04 1.14 1.09 1.08   GLUCOSE mg/dL 156* 165* 186* 193* 169* 169* 172*   CALCIUM mg/dL 8.7 8.6 8.7 8.3* 8.3* 8.2* 8.1*   BILIRUBIN mg/dL 0.6 0.6 0.6 0.8 0.9 1.0 0.9   ALK PHOS U/L 117 123* 132* 151* 168* 201* 204*   ALT (SGPT) U/L 52* 63* 78* 103* 139* 184* 249*   AST (SGOT) U/L 30 20 21 25 29 39 74*     Results from last 7 days   Lab Units 05/07/23  0702 05/06/23  0621 05/05/23  0624 05/04/23  0625 05/03/23  0647 05/02/23  0625 05/01/23  0540   MAGNESIUM mg/dL 2.0 2.1 2.0 2.0 2.1 2.2 2.1     Results from last 7 days   Lab Units 05/07/23  0702 05/06/23  0621  05/05/23  0624 05/04/23  0625 05/03/23  0647 05/02/23  0625 05/01/23  0540   WBC 10*3/mm3 4.54 4.49 4.07 4.36 4.92 5.92 6.43   HEMOGLOBIN g/dL 10.2* 9.9* 9.5* 9.4* 9.2* 9.2* 8.7*   HEMATOCRIT % 32.9* 30.9* 30.6* 30.4* 28.9* 29.9* 26.9*   PLATELETS 10*3/mm3 193 169 169 142 125* 120* 97*     Lab Results   Component Value Date    CKTOTAL 119 04/23/2023    CKMB 2.16 04/23/2023    TROPONINI <0.012 11/19/2014    TROPONINT 52 (H) 04/25/2023     CO2   Date Value Ref Range Status   05/07/2023 25.0 22.0 - 29.0 mmol/L Final         Imaging Results (Most Recent)     Procedure Component Value Units Date/Time    US Venous Doppler Lower Extremity Bilateral (duplex) [[id number]] Collected: 04/28/23 2142     Updated: 04/28/23 2154    Narrative:      EXAMINATION:  Ultrasound bilateral lower extremity venous Doppler.    COMPARISON:  No comparison.    HISTORY:  Positive PE, concern for DVT.    TECHNIQUE:  High-resolution gray scale imaging, color flow Doppler, compression were  performed from the groin to the calf of the bilateral lower extremities.  Augmentation was performed of the bilateral common femoral vein and popliteal  vein.    FINDINGS:  There is DVT within both popliteal veins.  The remaining deep veins appear  patent with normal compressibility.    There is a complex collection in the left popliteal fossa containing shadowing  calcification or two calcifications.      Impression:      1.  Bilateral DVT within the popliteal veins.    2.  Complex cystic area in the left popliteal fossa measuring up to 6.2 cm.  This may represent a complex Baker's cyst with loose bodies or calcifications.    US Renal Bilateral [[id number]] Collected: 04/26/23 0820     Updated: 04/26/23 1208    Narrative:      HISTORY:  Urinary retention.    COMPARISON:  None    TECHNIQUE:  Real-time ultrasound imaging and color Doppler used to evaluate the kidneys and  bladder.    FINDINGS:  The right kidney measures 13.1 x 5.9 x 4.5 cm. The left kidney measures  13 x 5.8  x 6.5 cm.  Cortical thickness and echotexture are within normal limits. There is  no hydronephrosis or evidence of mass. No calculus was seen.  Lower pole cyst on  the right measuring 4.9 x 4.1 x 4.6 cm.  This appears to be an anechoic  partially exophytic cyst.    The bladder was collapsed around a Colbert catheter.      Impression:      A 5 cm lower pole right renal cyst appears simple.  No acute sonographic  abnormality.      CT Angiogram Chest [[id number]] Collected: 04/26/23 0831     Updated: 04/26/23 1137    Narrative:      EXAM:  CTA chest with contrast, PE protocol.    COMPARISON:  CTA chest PE protocol, 04/24/2023.    HISTORY:  Pulmonary embolism suspected, unknown D-dimer.  Shortness of breath.    TECHNIQUE:  Intravenous contrast was administered and axial images from the thoracic inlet  through the diaphragm were performed followed by 2D multiplanar reformats.  MIPS  were reconstructed and reviewed.    FINDINGS:  Limited by respiratory motion artifact.    Mild bilateral dependent atelectasis.  Lungs are otherwise clear.  No  consolidation.    Trace bilateral pleural effusions.  Esophagus is collapsed and poorly evaluated.  Mild bilateral gynecomastia.  There is some mild lymphadenopathy suspected in  the upper mediastinum, nonspecific.  No lymphadenopathy identified elsewhere in  the chest by size criteria.    Extensive bilateral pulmonary emboli with saddle embolus extending across the  bifurcation of the main pulmonary artery.  There is extensive embolic disease  throughout all lobes of both lungs. The Main  pulmonary artery is enlarged  measuring up to 4.6 cm in diameter.  There is abnormal flattening or even  leftward convexity of the intraventricular septum.    Limited evaluation of the upper abdomen due to motion.  No gross acute osseous  abnormality.      Impression:      1.  Extensive bilateral pulmonary emboli with saddle embolus extending across  the main pulmonary artery bifurcation.   There is enlargement of the main  pulmonary artery as well as leftward bowing or convexity of the intraventricular  septum.  These findings may be seen in the setting of right heart strain.    2.  The exam is otherwise limited by motion.  There is mild dependent  atelectasis.  Lungs are otherwise grossly clear.    3.  Mild lymphadenopathy in the upper mediastinum, nonspecific.  Consider  reevaluation on short-term follow-up CT chest.        XR Chest 1 View [[id number]] Collected: 04/25/23 0744     Updated: 04/25/23 0902    Narrative:      HISTORY:  Post code.    COMPARISON:  4/23/2023    FINDINGS:  AP view of the chest demonstrates clear lungs and mild cardiomegaly.  There is  no pneumothorax.  Left-sided AICD is unchanged.      Impression:      Mild cardiomegaly.      CT Angiogram Chest [[id number]] Collected: 04/24/23 0511     Updated: 04/24/23 0658    Narrative:      INDICATION:  Positive D-dimer    COMPARISON:  None    TECHNIQUE:  Volumetric CT scan of the chest, from the thoracic inlet to the level of the  diaphragms, with intravenous contrast.  2D axial, sagittal, and coronal  reformats were performed.  MIPS were performed on a separate workstation and  reviewed.    FINDINGS:  Lungs: No focal consolidation or mass.  Pleura: No effusion or pneumothorax.  Vessels: Thoracic aorta is normal in caliber.  Bones: No suspicious lesions.  Visualized upper abdomen: Unremarkable.      Impression:      I suspect there are bilateral pulmonary emboli, but it is difficult to tell with  certainty due to suboptimal contrast timing and significant motion artifact.  Repeat examination is recommended.    XR Chest 1 View [[id number]] Collected: 04/23/23 1630     Updated: 04/23/23 1648    Narrative:      HISTORY:  Shortness of breath.    FINDINGS:  Frontal film of the chest was obtained.  There is a dual-lead pacing system in  place via the left subclavian venous approach.  The pacing leads appear to be in  good position.  There is  mild pulmonary vascular congestion with mild  cardiomegaly.  No infiltrate or effusion is seen.      Impression:      1.  Mild cardiomegaly with mild pulmonary vascular congestion.    2.  No acute infiltrate or effusion is seen.         No results found for: ACANTHNAEG, AFBCX, BPERTUSSISCX, BLOODCX  No results found for: BCIDPCR, CXREFLEX, CSFCX, CULTURETIS  No results found for: CULTURES, HSVCX, URCX  No results found for: EYECULTURE, GCCX, HSVCULTURE, LABHSV  No results found for: LEGIONELLA, MRSACX, MUMPSCX, MYCOPLASCX  No results found for: NOCARDIACX, STOOLCX  No results found for: THROATCX, UNSTIMCULT, URINECX, CULTURE, VZVCULTUR  No results found for: VIRALCULTU, WOUNDCX  Medication Reviewed and Will Continue Unless Documented in Plan:   ascorbic acid, 1,000 mg, Oral, Nightly  cetirizine, 10 mg, Oral, Daily  vitamin D3, 5,000 Units, Oral, Nightly  docusate sodium, 200 mg, Oral, BID  dronedarone, 400 mg, Oral, Daily With Dinner  fluticasone, 2 spray, Each Nare, Daily  fondaparinux, 10 mg, Subcutaneous, Q24H  glipizide, 10 mg, Oral, QAM AC  Insulin Aspart, 2-12 Units, Subcutaneous, 4x Daily With Meals & Nightly  magnesium oxide, 400 mg, Oral, BID  metoprolol succinate XL, 25 mg, Oral, BID  senna-docusate sodium, 2 tablet, Oral, BID  sodium bicarbonate, 650 mg, Oral, TID  sodium chloride, 3 mL, Intravenous, Q12H  sodium chloride, 100 mL/hr, Intravenous, Once  tamsulosin, 0.4 mg, Oral, BID  vancomycin, 1,250 mg, Intravenous, Q12H      Pharmacy to dose vancomycin,       •  acetaminophen  •  bisacodyl  •  calcium carbonate  •  hydrALAZINE  •  hydrOXYzine pamoate  •  muscle rub  •  ondansetron  •  oxyCODONE-acetaminophen  •  Pharmacy to dose vancomycin  •  simethicone  •  sodium chloride  •  traZODone  Pharmacy to dose vancomycin,        Dietary Orders (From admission, onward)     Start     Ordered    04/27/23 1524  Diet: Diabetic Diets, Cardiac Diets; Healthy Heart (2-3 Na+); Consistent Carbohydrate; Texture:  Regular Texture (IDDSI 7); Fluid Consistency: Thin (IDDSI 0)  Diet Effective Now        References:    Diet Order [place]   Question Answer Comment   Diets: Diabetic Diets    Diets: Cardiac Diets    Cardiac Diet: Healthy Heart (2-3 Na+)    Diabetic Diet: Consistent Carbohydrate    Texture: Regular Texture (IDDSI 7)    Fluid Consistency: Thin (IDDSI 0)        04/27/23 1718              History, physical exam, assessment and plan may have been partly or fully copied from before, but  changes made to the copied record to reflect care on the date of service. Part of the lab and imaging  reports auto populated and corrected. Some of this note may be an electronic transcription of spoken  language to printed text. This may permit erroneous, or at times, nonsensical words or phrases to be  inadvertently transcribed. Although I have reviewed the note for such errors, some may still exist.      This document has been electronically signed by Charlie Persaud MD on May 7, 2023 18:41 CDT

## 2023-05-08 ENCOUNTER — OUTSIDE FACILITY SERVICE (OUTPATIENT)
Dept: PULMONOLOGY | Facility: CLINIC | Age: 63
End: 2023-05-08
Payer: MEDICARE

## 2023-05-08 LAB
ALBUMIN SERPL-MCNC: 3.6 G/DL (ref 3.5–5.2)
ALBUMIN/GLOB SERPL: 1.1 G/DL
ALP SERPL-CCNC: 114 U/L (ref 39–117)
ALT SERPL W P-5'-P-CCNC: 50 U/L (ref 1–41)
ANION GAP SERPL CALCULATED.3IONS-SCNC: 11 MMOL/L (ref 5–15)
AST SERPL-CCNC: 30 U/L (ref 1–40)
BASOPHILS # BLD AUTO: 0.06 10*3/MM3 (ref 0–0.2)
BASOPHILS NFR BLD AUTO: 1.3 % (ref 0–1.5)
BILIRUB SERPL-MCNC: 0.8 MG/DL (ref 0–1.2)
BUN SERPL-MCNC: 22 MG/DL (ref 8–23)
BUN/CREAT SERPL: 20.6 (ref 7–25)
CALCIUM SPEC-SCNC: 8.5 MG/DL (ref 8.6–10.5)
CHLORIDE SERPL-SCNC: 101 MMOL/L (ref 98–107)
CO2 SERPL-SCNC: 24 MMOL/L (ref 22–29)
CREAT SERPL-MCNC: 1.07 MG/DL (ref 0.76–1.27)
DEPRECATED RDW RBC AUTO: 48.9 FL (ref 37–54)
EGFRCR SERPLBLD CKD-EPI 2021: 79 ML/MIN/1.73
EOSINOPHIL # BLD AUTO: 0.2 10*3/MM3 (ref 0–0.4)
EOSINOPHIL NFR BLD AUTO: 4.4 % (ref 0.3–6.2)
ERYTHROCYTE [DISTWIDTH] IN BLOOD BY AUTOMATED COUNT: 14.7 % (ref 12.3–15.4)
GLOBULIN UR ELPH-MCNC: 3.2 GM/DL
GLUCOSE BLDC GLUCOMTR-MCNC: 133 MG/DL (ref 70–130)
GLUCOSE BLDC GLUCOMTR-MCNC: 158 MG/DL (ref 70–130)
GLUCOSE BLDC GLUCOMTR-MCNC: 161 MG/DL (ref 70–130)
GLUCOSE BLDC GLUCOMTR-MCNC: 202 MG/DL (ref 70–130)
GLUCOSE SERPL-MCNC: 166 MG/DL (ref 65–99)
HCT VFR BLD AUTO: 32.2 % (ref 37.5–51)
HGB BLD-MCNC: 10.2 G/DL (ref 13–17.7)
IMM GRANULOCYTES # BLD AUTO: 0.02 10*3/MM3 (ref 0–0.05)
IMM GRANULOCYTES NFR BLD AUTO: 0.4 % (ref 0–0.5)
LYMPHOCYTES # BLD AUTO: 1.12 10*3/MM3 (ref 0.7–3.1)
LYMPHOCYTES NFR BLD AUTO: 24.6 % (ref 19.6–45.3)
MAGNESIUM SERPL-MCNC: 2 MG/DL (ref 1.6–2.4)
MCH RBC QN AUTO: 28.8 PG (ref 26.6–33)
MCHC RBC AUTO-ENTMCNC: 31.7 G/DL (ref 31.5–35.7)
MCV RBC AUTO: 91 FL (ref 79–97)
MONOCYTES # BLD AUTO: 0.52 10*3/MM3 (ref 0.1–0.9)
MONOCYTES NFR BLD AUTO: 11.4 % (ref 5–12)
NEUTROPHILS NFR BLD AUTO: 2.63 10*3/MM3 (ref 1.7–7)
NEUTROPHILS NFR BLD AUTO: 57.9 % (ref 42.7–76)
NRBC BLD AUTO-RTO: 0 /100 WBC (ref 0–0.2)
PLATELET # BLD AUTO: 209 10*3/MM3 (ref 140–450)
PMV BLD AUTO: 10.9 FL (ref 6–12)
POTASSIUM SERPL-SCNC: 4.2 MMOL/L (ref 3.5–5.2)
PROT SERPL-MCNC: 6.8 G/DL (ref 6–8.5)
RBC # BLD AUTO: 3.54 10*6/MM3 (ref 4.14–5.8)
SODIUM SERPL-SCNC: 136 MMOL/L (ref 136–145)
WBC NRBC COR # BLD: 4.55 10*3/MM3 (ref 3.4–10.8)

## 2023-05-08 PROCEDURE — 97110 THERAPEUTIC EXERCISES: CPT

## 2023-05-08 PROCEDURE — 25010000002 VANCOMYCIN 10 G RECONSTITUTED SOLUTION: Performed by: INTERNAL MEDICINE

## 2023-05-08 PROCEDURE — 83735 ASSAY OF MAGNESIUM: CPT | Performed by: INTERNAL MEDICINE

## 2023-05-08 PROCEDURE — 97530 THERAPEUTIC ACTIVITIES: CPT

## 2023-05-08 PROCEDURE — 80053 COMPREHEN METABOLIC PANEL: CPT | Performed by: INTERNAL MEDICINE

## 2023-05-08 PROCEDURE — 82948 REAGENT STRIP/BLOOD GLUCOSE: CPT

## 2023-05-08 PROCEDURE — 25010000002 FONDAPARINUX PER 0.5 MG: Performed by: INTERNAL MEDICINE

## 2023-05-08 PROCEDURE — 85025 COMPLETE CBC W/AUTO DIFF WBC: CPT | Performed by: INTERNAL MEDICINE

## 2023-05-08 NOTE — PROGRESS NOTES
Pharmacokinetics by Pharmacy - Vancomycin    Emre Lisa is a 63 y.o. male receiving vancomycin day 25 for bone and/or joint infection    Objective:  [Ht: 182.9 cm; Wt: (!) 170 kg (375 lb 11.2 oz)]     WBC   Date Value Ref Range Status   05/08/2023 4.55 3.40 - 10.80 10*3/mm3 Final   05/07/2023 4.54 3.40 - 10.80 10*3/mm3 Final   05/06/2023 4.49 3.40 - 10.80 10*3/mm3 Final    No results found for: CRP, LACTATE   Temp Readings from Last 1 Encounters:   04/21/23 98.6 °F (37 °C)     Estimated Creatinine Clearance: 117.9 mL/min (by C-G formula based on SCr of 1.07 mg/dL).   Creatinine   Date Value Ref Range Status   05/08/2023 1.07 0.76 - 1.27 mg/dL Final   05/07/2023 1.24 0.76 - 1.27 mg/dL Final   05/06/2023 1.00 0.76 - 1.27 mg/dL Final       Vancomycin Trough   Date Value Ref Range Status   05/05/2023 13.80 5.00 - 20.00 mcg/mL Final       Culture Results:  Microbiology Results (last 10 days)     ** No results found for the last 240 hours. **        No results found for: RESPCX    Assessment:    WBC 4.55, WNL   Scr 1.07, WNL   No new cultures    Vancomycin levels WNL on 5/5/23. Trough and AUC goals 10-20 and 400-600, respectively.    Plan:  1. Continue vancomycin 1250mg IV Q12H. Consult ends 5/26/23.   2. Vancomycin peak on 5/9 at 1830 and vancomycin trough on 5/10 at 0330.  3. Pharmacy will monitor renal function and adjust dose accordingly.      Eloy Mendoza RPH   05/08/23 10:25 CDT

## 2023-05-08 NOTE — ACP (ADVANCE CARE PLANNING)
"Visited patient after lunch. He said he is getting enough to eat, likes what he gets, and didn't need anything re his meals. He has a menu in his room to choose from. He said he is choosing his meals to \"watch \" his carb counting, but didn't say how many carbs he wants to choose. His intake is 100% for today so far, yesterday, and the day before. His weight is 359.5 lb. Down from 378lb. since last week when RDN visited.  This RDN will continue to monitor.  "

## 2023-05-08 NOTE — ACP (ADVANCE CARE PLANNING)
[facility] @ [facility]  INPATIENT PROGRESS NOTE    PATIENT NAME: Emre Lisa      PHYSICIAN: Charlie Persaud MD  : 1960        MRN: [id number]  Patient Care Team:  Jamaal Bravo MD as PCP - Marco A Chauhan DPM as Consulting Physician (Podiatry)    Chief Complaint:  Continued antibiotic therapy and medical management  History of Present Illness: Patient is a 62 y/o male with a pmh of type 2 DM, hypertension, CAD, and neuropathy.  He recently underwent surgical repair of Charcot deformity with external fixator stabilization to his left foot related to cellulitis.  He tolerated the surgical procedure well.  He will need IV antibiotic therapy through 23 due to bone/joint infection.  He remains non-weight bearing of left lower extremity.  He tells me the pain to his left foot remains controlled, however, he does have a torn left rotator cuff and arthritis so his hip and shoulder are the source of his pain.  He tells me he is doing well and he has no other complaints or concerns.  I have reviewed the patients most recent labs and diagnostics independently.  Glucose 124, BUN 15, creatinine 0.89, sodium 138, potassium 4.2, chloride 105, co2 24, wbc 5.25, hgb 9.9, hct 30.1, platelets 156.       Assessment      Cellulitis of left foot  Charcot's joint of left foot  Type 2 DM  Arthritis  A-fib  Hypertension  Rheumatoid arthritis  DVT & GI prophylaxis     DVT Prophylaxis: Heparin  GI Prophylaxis: Protonix  Code Status: Full    Plan     -Stable at time of exam  -Labs remain stable  -Tolerating room air  -Continue fondaparinux.  -Continue therapy we will try to get patient out of bed to chair as tolerated.  -All specialists and consultants follow-up appreciated.  -Patient remains clinically stable and improved.  -DVT and GI prophylaxis in place.  -We'll continue monitoring patient in hospital setting and treat patient as course  dictates.  -Please review orders for detailed plan of care.  -For Acute and Chronic medical condition we will continue medications described below in medication section which have been reviewed and we will continue unless changed in plan of care.  -Laboratory and diagnostic studies have been independently reviewed and the reports are reviewed as documented below.      Subjective   Interval History:   Patient Complaints:   Patient seen and examined.  Resting comfortably in bed.  No overnight events reported.  History taken from: Patient, patient's chart.    Review of Systems:    Review of Systems   Constitutional: Positive for activity change. Negative for chills and fever.   HENT: Negative for postnasal drip and tinnitus.    Respiratory: Negative for apnea.    Cardiovascular: Negative for chest pain and palpitations.   Gastrointestinal: Negative for abdominal distention, nausea and vomiting.   Musculoskeletal: Positive for arthralgias and myalgias.   Skin: Positive for wound.   Neurological: Negative for tremors and seizures.   Psychiatric/Behavioral: Negative for agitation, confusion and hallucinations.       Objective   Vital Signs:  Temp: 97 F         Heart Rate: 94         Resp: 21          Blood Pressure: 109/73         Pulse Ox: 97%    Physical Exam:   Physical Exam  Vitals reviewed.   Constitutional:       Appearance: Normal appearance. He is not ill-appearing.   HENT:      Head: Normocephalic and atraumatic.      Mouth/Throat:      Mouth: Mucous membranes are moist.      Pharynx: Oropharynx is clear.   Eyes:      Extraocular Movements: Extraocular movements intact.      Pupils: Pupils are equal, round, and reactive to light.   Neck:      Vascular: No carotid bruit.   Cardiovascular:      Rate and Rhythm: Regular rhythm. Tachycardia present.      Pulses: Normal pulses.      Heart sounds: Normal heart sounds. No murmur heard.    No gallop.   Pulmonary:      Effort: Pulmonary effort is normal. No respiratory  distress.      Breath sounds: Normal breath sounds. No rales.   Abdominal:      General: Bowel sounds are normal. There is no distension.      Palpations: Abdomen is soft.      Tenderness: There is no guarding.   Musculoskeletal:         General: Normal range of motion.      Cervical back: Normal range of motion and neck supple.      Right lower leg: Edema present.      Left lower leg: Edema present.   Skin:     General: Skin is warm and dry.      Capillary Refill: Capillary refill takes less than 2 seconds.      Findings: Bruising present.      Comments: S/P L surgical repair Charcot foot with external fixator.  Wound vac in place   Neurological:      General: No focal deficit present.      Mental Status: He is alert and oriented to person, place, and time.   Psychiatric:         Mood and Affect: Mood normal.         Behavior: Behavior normal.         Thought Content: Thought content normal.         Judgment: Judgment normal.       Results Review:       Results from last 7 days   Lab Units 05/08/23 0529 05/07/23  0702 05/06/23  0621 05/05/23  0624 05/04/23  0625 05/03/23  0647 05/02/23  0625   SODIUM mmol/L 136 140 136 137 136 136 134*   POTASSIUM mmol/L 4.2 4.8 4.4 4.5 4.4 4.6 4.5   CHLORIDE mmol/L 101 104 103 101 102 101 102   CO2 mmol/L 24.0 25.0 21.0* 25.0 24.0 25.0 25.0   BUN mg/dL 22 23 20 20 19 18 22   CREATININE mg/dL 1.07 1.24 1.00 1.01 1.04 1.14 1.09   GLUCOSE mg/dL 166* 156* 165* 186* 193* 169* 169*   CALCIUM mg/dL 8.5* 8.7 8.6 8.7 8.3* 8.3* 8.2*   BILIRUBIN mg/dL 0.8 0.6 0.6 0.6 0.8 0.9 1.0   ALK PHOS U/L 114 117 123* 132* 151* 168* 201*   ALT (SGPT) U/L 50* 52* 63* 78* 103* 139* 184*   AST (SGOT) U/L 30 30 20 21 25 29 39     Results from last 7 days   Lab Units 05/08/23 0529 05/07/23  0702 05/06/23  0621 05/05/23  0624 05/04/23  0625 05/03/23  0647 05/02/23  0625   MAGNESIUM mg/dL 2.0 2.0 2.1 2.0 2.0 2.1 2.2     Results from last 7 days   Lab Units 05/08/23  0529 05/07/23  0702 05/06/23  0621  05/05/23  0624 05/04/23  0625 05/03/23  0647 05/02/23  0625   WBC 10*3/mm3 4.55 4.54 4.49 4.07 4.36 4.92 5.92   HEMOGLOBIN g/dL 10.2* 10.2* 9.9* 9.5* 9.4* 9.2* 9.2*   HEMATOCRIT % 32.2* 32.9* 30.9* 30.6* 30.4* 28.9* 29.9*   PLATELETS 10*3/mm3 209 193 169 169 142 125* 120*     Lab Results   Component Value Date    CKTOTAL 119 04/23/2023    CKMB 2.16 04/23/2023    TROPONINI <0.012 11/19/2014    TROPONINT 52 (H) 04/25/2023     CO2   Date Value Ref Range Status   05/08/2023 24.0 22.0 - 29.0 mmol/L Final         Imaging Results (Most Recent)     Procedure Component Value Units Date/Time    US Venous Doppler Lower Extremity Bilateral (duplex) [[id number]] Collected: 04/28/23 2142     Updated: 04/28/23 2154    Narrative:      EXAMINATION:  Ultrasound bilateral lower extremity venous Doppler.    COMPARISON:  No comparison.    HISTORY:  Positive PE, concern for DVT.    TECHNIQUE:  High-resolution gray scale imaging, color flow Doppler, compression were  performed from the groin to the calf of the bilateral lower extremities.  Augmentation was performed of the bilateral common femoral vein and popliteal  vein.    FINDINGS:  There is DVT within both popliteal veins.  The remaining deep veins appear  patent with normal compressibility.    There is a complex collection in the left popliteal fossa containing shadowing  calcification or two calcifications.      Impression:      1.  Bilateral DVT within the popliteal veins.    2.  Complex cystic area in the left popliteal fossa measuring up to 6.2 cm.  This may represent a complex Baker's cyst with loose bodies or calcifications.    US Renal Bilateral [[id number]] Collected: 04/26/23 0820     Updated: 04/26/23 1208    Narrative:      HISTORY:  Urinary retention.    COMPARISON:  None    TECHNIQUE:  Real-time ultrasound imaging and color Doppler used to evaluate the kidneys and  bladder.    FINDINGS:  The right kidney measures 13.1 x 5.9 x 4.5 cm. The left kidney measures 13 x 5.8  x  6.5 cm.  Cortical thickness and echotexture are within normal limits. There is  no hydronephrosis or evidence of mass. No calculus was seen.  Lower pole cyst on  the right measuring 4.9 x 4.1 x 4.6 cm.  This appears to be an anechoic  partially exophytic cyst.    The bladder was collapsed around a Colbert catheter.      Impression:      A 5 cm lower pole right renal cyst appears simple.  No acute sonographic  abnormality.      CT Angiogram Chest [[id number]] Collected: 04/26/23 0831     Updated: 04/26/23 1137    Narrative:      EXAM:  CTA chest with contrast, PE protocol.    COMPARISON:  CTA chest PE protocol, 04/24/2023.    HISTORY:  Pulmonary embolism suspected, unknown D-dimer.  Shortness of breath.    TECHNIQUE:  Intravenous contrast was administered and axial images from the thoracic inlet  through the diaphragm were performed followed by 2D multiplanar reformats.  MIPS  were reconstructed and reviewed.    FINDINGS:  Limited by respiratory motion artifact.    Mild bilateral dependent atelectasis.  Lungs are otherwise clear.  No  consolidation.    Trace bilateral pleural effusions.  Esophagus is collapsed and poorly evaluated.  Mild bilateral gynecomastia.  There is some mild lymphadenopathy suspected in  the upper mediastinum, nonspecific.  No lymphadenopathy identified elsewhere in  the chest by size criteria.    Extensive bilateral pulmonary emboli with saddle embolus extending across the  bifurcation of the main pulmonary artery.  There is extensive embolic disease  throughout all lobes of both lungs. The Main  pulmonary artery is enlarged  measuring up to 4.6 cm in diameter.  There is abnormal flattening or even  leftward convexity of the intraventricular septum.    Limited evaluation of the upper abdomen due to motion.  No gross acute osseous  abnormality.      Impression:      1.  Extensive bilateral pulmonary emboli with saddle embolus extending across  the main pulmonary artery bifurcation.  There is  enlargement of the main  pulmonary artery as well as leftward bowing or convexity of the intraventricular  septum.  These findings may be seen in the setting of right heart strain.    2.  The exam is otherwise limited by motion.  There is mild dependent  atelectasis.  Lungs are otherwise grossly clear.    3.  Mild lymphadenopathy in the upper mediastinum, nonspecific.  Consider  reevaluation on short-term follow-up CT chest.        XR Chest 1 View [[id number]] Collected: 04/25/23 0744     Updated: 04/25/23 0902    Narrative:      HISTORY:  Post code.    COMPARISON:  4/23/2023    FINDINGS:  AP view of the chest demonstrates clear lungs and mild cardiomegaly.  There is  no pneumothorax.  Left-sided AICD is unchanged.      Impression:      Mild cardiomegaly.      CT Angiogram Chest [[id number]] Collected: 04/24/23 0511     Updated: 04/24/23 0658    Narrative:      INDICATION:  Positive D-dimer    COMPARISON:  None    TECHNIQUE:  Volumetric CT scan of the chest, from the thoracic inlet to the level of the  diaphragms, with intravenous contrast.  2D axial, sagittal, and coronal  reformats were performed.  MIPS were performed on a separate workstation and  reviewed.    FINDINGS:  Lungs: No focal consolidation or mass.  Pleura: No effusion or pneumothorax.  Vessels: Thoracic aorta is normal in caliber.  Bones: No suspicious lesions.  Visualized upper abdomen: Unremarkable.      Impression:      I suspect there are bilateral pulmonary emboli, but it is difficult to tell with  certainty due to suboptimal contrast timing and significant motion artifact.  Repeat examination is recommended.    XR Chest 1 View [[id number]] Collected: 04/23/23 1630     Updated: 04/23/23 1648    Narrative:      HISTORY:  Shortness of breath.    FINDINGS:  Frontal film of the chest was obtained.  There is a dual-lead pacing system in  place via the left subclavian venous approach.  The pacing leads appear to be in  good position.  There is mild  pulmonary vascular congestion with mild  cardiomegaly.  No infiltrate or effusion is seen.      Impression:      1.  Mild cardiomegaly with mild pulmonary vascular congestion.    2.  No acute infiltrate or effusion is seen.         No results found for: ACANTHNAEG, AFBCX, BPERTUSSISCX, BLOODCX  No results found for: BCIDPCR, CXREFLEX, CSFCX, CULTURETIS  No results found for: CULTURES, HSVCX, URCX  No results found for: EYECULTURE, GCCX, HSVCULTURE, LABHSV  No results found for: LEGIONELLA, MRSACX, MUMPSCX, MYCOPLASCX  No results found for: NOCARDIACX, STOOLCX  No results found for: THROATCX, UNSTIMCULT, URINECX, CULTURE, VZVCULTUR  No results found for: VIRALCULTU, WOUNDCX  Medication Reviewed and Will Continue Unless Documented in Plan:   ascorbic acid, 1,000 mg, Oral, Nightly  cetirizine, 10 mg, Oral, Daily  vitamin D3, 5,000 Units, Oral, Nightly  docusate sodium, 200 mg, Oral, BID  dronedarone, 400 mg, Oral, Daily With Dinner  fluticasone, 2 spray, Each Nare, Daily  fondaparinux, 10 mg, Subcutaneous, Q24H  glipizide, 10 mg, Oral, QAM AC  Insulin Aspart, 2-12 Units, Subcutaneous, 4x Daily With Meals & Nightly  magnesium oxide, 400 mg, Oral, BID  metoprolol succinate XL, 25 mg, Oral, BID  senna-docusate sodium, 2 tablet, Oral, BID  sodium bicarbonate, 650 mg, Oral, TID  sodium chloride, 3 mL, Intravenous, Q12H  sodium chloride, 100 mL/hr, Intravenous, Once  tamsulosin, 0.4 mg, Oral, BID  vancomycin, 1,250 mg, Intravenous, Q12H      Pharmacy to dose vancomycin,       •  acetaminophen  •  bisacodyl  •  calcium carbonate  •  hydrALAZINE  •  hydrOXYzine pamoate  •  muscle rub  •  ondansetron  •  oxyCODONE-acetaminophen  •  Pharmacy to dose vancomycin  •  simethicone  •  sodium chloride  •  traZODone  Pharmacy to dose vancomycin,        Dietary Orders (From admission, onward)     Start     Ordered    04/27/23 1336  Diet: Diabetic Diets, Cardiac Diets; Healthy Heart (2-3 Na+); Consistent Carbohydrate; Texture: Regular  Texture (IDDSI 7); Fluid Consistency: Thin (IDDSI 0)  Diet Effective Now        References:    Diet Order [place]   Question Answer Comment   Diets: Diabetic Diets    Diets: Cardiac Diets    Cardiac Diet: Healthy Heart (2-3 Na+)    Diabetic Diet: Consistent Carbohydrate    Texture: Regular Texture (IDDSI 7)    Fluid Consistency: Thin (IDDSI 0)        04/27/23 2038              History, physical exam, assessment and plan may have been partly or fully copied from before, but  changes made to the copied record to reflect care on the date of service. Part of the lab and imaging  reports auto populated and corrected. Some of this note may be an electronic transcription of spoken  language to printed text. This may permit erroneous, or at times, nonsensical words or phrases to be  inadvertently transcribed. Although I have reviewed the note for such errors, some may still exist.      This document has been electronically signed by Charlie Persaud MD on May 8, 2023 10:03 CDT

## 2023-05-09 ENCOUNTER — OUTSIDE FACILITY SERVICE (OUTPATIENT)
Dept: PULMONOLOGY | Facility: CLINIC | Age: 63
End: 2023-05-09
Payer: MEDICARE

## 2023-05-09 LAB
ALBUMIN SERPL-MCNC: 3.7 G/DL (ref 3.5–5.2)
ALBUMIN/GLOB SERPL: 1.2 G/DL
ALP SERPL-CCNC: 116 U/L (ref 39–117)
ALT SERPL W P-5'-P-CCNC: 48 U/L (ref 1–41)
ANION GAP SERPL CALCULATED.3IONS-SCNC: 11 MMOL/L (ref 5–15)
AST SERPL-CCNC: 30 U/L (ref 1–40)
BASOPHILS # BLD AUTO: 0.05 10*3/MM3 (ref 0–0.2)
BASOPHILS NFR BLD AUTO: 0.9 % (ref 0–1.5)
BILIRUB SERPL-MCNC: 0.9 MG/DL (ref 0–1.2)
BUN SERPL-MCNC: 23 MG/DL (ref 8–23)
BUN/CREAT SERPL: 20.7 (ref 7–25)
CALCIUM SPEC-SCNC: 8.8 MG/DL (ref 8.6–10.5)
CHLORIDE SERPL-SCNC: 102 MMOL/L (ref 98–107)
CO2 SERPL-SCNC: 25 MMOL/L (ref 22–29)
CREAT SERPL-MCNC: 1.11 MG/DL (ref 0.76–1.27)
DEPRECATED RDW RBC AUTO: 49.2 FL (ref 37–54)
EGFRCR SERPLBLD CKD-EPI 2021: 75.5 ML/MIN/1.73
EOSINOPHIL # BLD AUTO: 0.17 10*3/MM3 (ref 0–0.4)
EOSINOPHIL NFR BLD AUTO: 3 % (ref 0.3–6.2)
ERYTHROCYTE [DISTWIDTH] IN BLOOD BY AUTOMATED COUNT: 14.8 % (ref 12.3–15.4)
GLOBULIN UR ELPH-MCNC: 3.2 GM/DL
GLUCOSE BLDC GLUCOMTR-MCNC: 152 MG/DL (ref 70–130)
GLUCOSE BLDC GLUCOMTR-MCNC: 154 MG/DL (ref 70–130)
GLUCOSE BLDC GLUCOMTR-MCNC: 169 MG/DL (ref 70–130)
GLUCOSE BLDC GLUCOMTR-MCNC: 197 MG/DL (ref 70–130)
GLUCOSE SERPL-MCNC: 160 MG/DL (ref 65–99)
HCT VFR BLD AUTO: 34.9 % (ref 37.5–51)
HGB BLD-MCNC: 11 G/DL (ref 13–17.7)
IMM GRANULOCYTES # BLD AUTO: 0.02 10*3/MM3 (ref 0–0.05)
IMM GRANULOCYTES NFR BLD AUTO: 0.3 % (ref 0–0.5)
INTERPRETATION: NORMAL
LYMPHOCYTES # BLD AUTO: 1.41 10*3/MM3 (ref 0.7–3.1)
LYMPHOCYTES NFR BLD AUTO: 24.5 % (ref 19.6–45.3)
MAGNESIUM SERPL-MCNC: 2.1 MG/DL (ref 1.6–2.4)
MCH RBC QN AUTO: 28.6 PG (ref 26.6–33)
MCHC RBC AUTO-ENTMCNC: 31.5 G/DL (ref 31.5–35.7)
MCV RBC AUTO: 90.6 FL (ref 79–97)
MONOCYTES # BLD AUTO: 0.7 10*3/MM3 (ref 0.1–0.9)
MONOCYTES NFR BLD AUTO: 12.2 % (ref 5–12)
NEUTROPHILS NFR BLD AUTO: 3.41 10*3/MM3 (ref 1.7–7)
NEUTROPHILS NFR BLD AUTO: 59.1 % (ref 42.7–76)
NRBC BLD AUTO-RTO: 0 /100 WBC (ref 0–0.2)
PLATELET # BLD AUTO: 191 10*3/MM3 (ref 140–450)
PMV BLD AUTO: 10.3 FL (ref 6–12)
POTASSIUM SERPL-SCNC: 4.6 MMOL/L (ref 3.5–5.2)
PROT SERPL-MCNC: 6.9 G/DL (ref 6–8.5)
RBC # BLD AUTO: 3.85 10*6/MM3 (ref 4.14–5.8)
SODIUM SERPL-SCNC: 138 MMOL/L (ref 136–145)
SPECIMEN STATUS: NORMAL
VANCOMYCIN PEAK SERPL-MCNC: 32.9 MCG/ML (ref 20–40)
WBC NRBC COR # BLD: 5.76 10*3/MM3 (ref 3.4–10.8)

## 2023-05-09 PROCEDURE — 97535 SELF CARE MNGMENT TRAINING: CPT

## 2023-05-09 PROCEDURE — 80202 ASSAY OF VANCOMYCIN: CPT | Performed by: INTERNAL MEDICINE

## 2023-05-09 PROCEDURE — 85025 COMPLETE CBC W/AUTO DIFF WBC: CPT | Performed by: INTERNAL MEDICINE

## 2023-05-09 PROCEDURE — 83735 ASSAY OF MAGNESIUM: CPT | Performed by: INTERNAL MEDICINE

## 2023-05-09 PROCEDURE — 82948 REAGENT STRIP/BLOOD GLUCOSE: CPT

## 2023-05-09 PROCEDURE — 25010000002 VANCOMYCIN 10 G RECONSTITUTED SOLUTION: Performed by: INTERNAL MEDICINE

## 2023-05-09 PROCEDURE — 25010000002 FONDAPARINUX PER 0.5 MG: Performed by: INTERNAL MEDICINE

## 2023-05-09 PROCEDURE — 80053 COMPREHEN METABOLIC PANEL: CPT | Performed by: INTERNAL MEDICINE

## 2023-05-09 NOTE — ACP (ADVANCE CARE PLANNING)
[facility] @ [facility]  INPATIENT PROGRESS NOTE    PATIENT NAME: Emre Lisa      PHYSICIAN: Charlie Persaud MD  : 1960        MRN: [id number]  Patient Care Team:  Jamaal Bravo MD as PCP - Marco A Chauhan DPM as Consulting Physician (Podiatry)    Chief Complaint:  Continued antibiotic therapy and medical management  History of Present Illness: Patient is a 62 y/o male with a pmh of type 2 DM, hypertension, CAD, and neuropathy.  He recently underwent surgical repair of Charcot deformity with external fixator stabilization to his left foot related to cellulitis.  He tolerated the surgical procedure well.  He will need IV antibiotic therapy through 23 due to bone/joint infection.  He remains non-weight bearing of left lower extremity.  He tells me the pain to his left foot remains controlled, however, he does have a torn left rotator cuff and arthritis so his hip and shoulder are the source of his pain.  He tells me he is doing well and he has no other complaints or concerns.  I have reviewed the patients most recent labs and diagnostics independently.  Glucose 124, BUN 15, creatinine 0.89, sodium 138, potassium 4.2, chloride 105, co2 24, wbc 5.25, hgb 9.9, hct 30.1, platelets 156.       Assessment      Cellulitis of left foot  Charcot's joint of left foot  Type 2 DM  Arthritis  A-fib  Hypertension  Rheumatoid arthritis  DVT & GI prophylaxis     DVT Prophylaxis: Heparin  GI Prophylaxis: Protonix  Code Status: Full    Plan     -Stable at time of exam  -Significant clinical improvement noted.   -Labs remain stable  -Tolerating room air  -Continue fondaparinux.  -Continue therapy we will try to get patient out of bed to chair as tolerated.  -All specialists and consultants follow-up appreciated.  -Patient remains clinically stable and improved.  -DVT and GI prophylaxis in place.  -We'll continue monitoring patient in hospital setting  and treat patient as course dictates.  -Please review orders for detailed plan of care.  -For Acute and Chronic medical condition we will continue medications described below in medication section which have been reviewed and we will continue unless changed in plan of care.  -Laboratory and diagnostic studies have been independently reviewed and the reports are reviewed as documented below.      Subjective   Interval History:   Patient Complaints:   Patient seen and examined.  Denies any pain, concerns at this time.  No overnight events reported.   History taken from: Patient, patient's chart.    Review of Systems:    Review of Systems   Constitutional: Positive for activity change. Negative for chills and fever.   HENT: Negative for postnasal drip and tinnitus.    Respiratory: Negative for apnea.    Cardiovascular: Negative for chest pain and palpitations.   Gastrointestinal: Negative for abdominal distention, nausea and vomiting.   Musculoskeletal: Positive for arthralgias and myalgias.   Skin: Positive for wound.   Neurological: Negative for tremors and seizures.   Psychiatric/Behavioral: Negative for agitation, confusion and hallucinations.       Objective   Vital Signs:  Temp: 98 F         Heart Rate: 87         Resp: 20          Blood Pressure: 109/62         Pulse Ox: 94%    Physical Exam:   Physical Exam  Vitals reviewed.   Constitutional:       Appearance: Normal appearance. He is not ill-appearing.   HENT:      Head: Normocephalic and atraumatic.      Mouth/Throat:      Mouth: Mucous membranes are moist.      Pharynx: Oropharynx is clear.   Eyes:      Extraocular Movements: Extraocular movements intact.      Pupils: Pupils are equal, round, and reactive to light.   Neck:      Vascular: No carotid bruit.   Cardiovascular:      Rate and Rhythm: Regular rhythm. Tachycardia present.      Pulses: Normal pulses.      Heart sounds: Normal heart sounds. No murmur heard.    No gallop.   Pulmonary:      Effort:  Pulmonary effort is normal. No respiratory distress.      Breath sounds: Normal breath sounds. No rales.   Abdominal:      General: Bowel sounds are normal. There is no distension.      Palpations: Abdomen is soft.      Tenderness: There is no guarding.   Musculoskeletal:         General: Normal range of motion.      Cervical back: Normal range of motion and neck supple.      Right lower leg: Edema present.      Left lower leg: Edema present.   Skin:     General: Skin is warm and dry.      Capillary Refill: Capillary refill takes less than 2 seconds.      Findings: Bruising present.      Comments: S/P L surgical repair Charcot foot with external fixator.  Wound vac in place   Neurological:      General: No focal deficit present.      Mental Status: He is alert and oriented to person, place, and time.   Psychiatric:         Mood and Affect: Mood normal.         Behavior: Behavior normal.         Thought Content: Thought content normal.         Judgment: Judgment normal.       Results Review:       Results from last 7 days   Lab Units 05/09/23  0606 05/08/23  0529 05/07/23  0702 05/06/23  0621 05/05/23  0624 05/04/23  0625 05/03/23  0647   SODIUM mmol/L 138 136 140 136 137 136 136   POTASSIUM mmol/L 4.6 4.2 4.8 4.4 4.5 4.4 4.6   CHLORIDE mmol/L 102 101 104 103 101 102 101   CO2 mmol/L 25.0 24.0 25.0 21.0* 25.0 24.0 25.0   BUN mg/dL 23 22 23 20 20 19 18   CREATININE mg/dL 1.11 1.07 1.24 1.00 1.01 1.04 1.14   GLUCOSE mg/dL 160* 166* 156* 165* 186* 193* 169*   CALCIUM mg/dL 8.8 8.5* 8.7 8.6 8.7 8.3* 8.3*   BILIRUBIN mg/dL 0.9 0.8 0.6 0.6 0.6 0.8 0.9   ALK PHOS U/L 116 114 117 123* 132* 151* 168*   ALT (SGPT) U/L 48* 50* 52* 63* 78* 103* 139*   AST (SGOT) U/L 30 30 30 20 21 25 29     Results from last 7 days   Lab Units 05/09/23  0606 05/08/23  0529 05/07/23  0702 05/06/23  0621 05/05/23  0624 05/04/23  0625 05/03/23  0647   MAGNESIUM mg/dL 2.1 2.0 2.0 2.1 2.0 2.0 2.1     Results from last 7 days   Lab Units  05/09/23  0606 05/08/23  0529 05/07/23  0702 05/06/23  0621 05/05/23  0624 05/04/23  0625 05/03/23  0647   WBC 10*3/mm3 5.76 4.55 4.54 4.49 4.07 4.36 4.92   HEMOGLOBIN g/dL 11.0* 10.2* 10.2* 9.9* 9.5* 9.4* 9.2*   HEMATOCRIT % 34.9* 32.2* 32.9* 30.9* 30.6* 30.4* 28.9*   PLATELETS 10*3/mm3 191 209 193 169 169 142 125*     Lab Results   Component Value Date    CKTOTAL 119 04/23/2023    CKMB 2.16 04/23/2023    TROPONINI <0.012 11/19/2014    TROPONINT 52 (H) 04/25/2023     CO2   Date Value Ref Range Status   05/09/2023 25.0 22.0 - 29.0 mmol/L Final         Imaging Results (Most Recent)     Procedure Component Value Units Date/Time    US Venous Doppler Lower Extremity Bilateral (duplex) [[id number]] Collected: 04/28/23 2142     Updated: 04/28/23 2154    Narrative:      EXAMINATION:  Ultrasound bilateral lower extremity venous Doppler.    COMPARISON:  No comparison.    HISTORY:  Positive PE, concern for DVT.    TECHNIQUE:  High-resolution gray scale imaging, color flow Doppler, compression were  performed from the groin to the calf of the bilateral lower extremities.  Augmentation was performed of the bilateral common femoral vein and popliteal  vein.    FINDINGS:  There is DVT within both popliteal veins.  The remaining deep veins appear  patent with normal compressibility.    There is a complex collection in the left popliteal fossa containing shadowing  calcification or two calcifications.      Impression:      1.  Bilateral DVT within the popliteal veins.    2.  Complex cystic area in the left popliteal fossa measuring up to 6.2 cm.  This may represent a complex Baker's cyst with loose bodies or calcifications.    US Renal Bilateral [[id number]] Collected: 04/26/23 0820     Updated: 04/26/23 1208    Narrative:      HISTORY:  Urinary retention.    COMPARISON:  None    TECHNIQUE:  Real-time ultrasound imaging and color Doppler used to evaluate the kidneys and  bladder.    FINDINGS:  The right kidney measures 13.1 x 5.9 x  4.5 cm. The left kidney measures 13 x 5.8  x 6.5 cm.  Cortical thickness and echotexture are within normal limits. There is  no hydronephrosis or evidence of mass. No calculus was seen.  Lower pole cyst on  the right measuring 4.9 x 4.1 x 4.6 cm.  This appears to be an anechoic  partially exophytic cyst.    The bladder was collapsed around a Colbert catheter.      Impression:      A 5 cm lower pole right renal cyst appears simple.  No acute sonographic  abnormality.      CT Angiogram Chest [[id number]] Collected: 04/26/23 0831     Updated: 04/26/23 1137    Narrative:      EXAM:  CTA chest with contrast, PE protocol.    COMPARISON:  CTA chest PE protocol, 04/24/2023.    HISTORY:  Pulmonary embolism suspected, unknown D-dimer.  Shortness of breath.    TECHNIQUE:  Intravenous contrast was administered and axial images from the thoracic inlet  through the diaphragm were performed followed by 2D multiplanar reformats.  MIPS  were reconstructed and reviewed.    FINDINGS:  Limited by respiratory motion artifact.    Mild bilateral dependent atelectasis.  Lungs are otherwise clear.  No  consolidation.    Trace bilateral pleural effusions.  Esophagus is collapsed and poorly evaluated.  Mild bilateral gynecomastia.  There is some mild lymphadenopathy suspected in  the upper mediastinum, nonspecific.  No lymphadenopathy identified elsewhere in  the chest by size criteria.    Extensive bilateral pulmonary emboli with saddle embolus extending across the  bifurcation of the main pulmonary artery.  There is extensive embolic disease  throughout all lobes of both lungs. The Main  pulmonary artery is enlarged  measuring up to 4.6 cm in diameter.  There is abnormal flattening or even  leftward convexity of the intraventricular septum.    Limited evaluation of the upper abdomen due to motion.  No gross acute osseous  abnormality.      Impression:      1.  Extensive bilateral pulmonary emboli with saddle embolus extending across  the main  pulmonary artery bifurcation.  There is enlargement of the main  pulmonary artery as well as leftward bowing or convexity of the intraventricular  septum.  These findings may be seen in the setting of right heart strain.    2.  The exam is otherwise limited by motion.  There is mild dependent  atelectasis.  Lungs are otherwise grossly clear.    3.  Mild lymphadenopathy in the upper mediastinum, nonspecific.  Consider  reevaluation on short-term follow-up CT chest.        XR Chest 1 View [[id number]] Collected: 04/25/23 0744     Updated: 04/25/23 0902    Narrative:      HISTORY:  Post code.    COMPARISON:  4/23/2023    FINDINGS:  AP view of the chest demonstrates clear lungs and mild cardiomegaly.  There is  no pneumothorax.  Left-sided AICD is unchanged.      Impression:      Mild cardiomegaly.      CT Angiogram Chest [[id number]] Collected: 04/24/23 0511     Updated: 04/24/23 0658    Narrative:      INDICATION:  Positive D-dimer    COMPARISON:  None    TECHNIQUE:  Volumetric CT scan of the chest, from the thoracic inlet to the level of the  diaphragms, with intravenous contrast.  2D axial, sagittal, and coronal  reformats were performed.  MIPS were performed on a separate workstation and  reviewed.    FINDINGS:  Lungs: No focal consolidation or mass.  Pleura: No effusion or pneumothorax.  Vessels: Thoracic aorta is normal in caliber.  Bones: No suspicious lesions.  Visualized upper abdomen: Unremarkable.      Impression:      I suspect there are bilateral pulmonary emboli, but it is difficult to tell with  certainty due to suboptimal contrast timing and significant motion artifact.  Repeat examination is recommended.    XR Chest 1 View [[id number]] Collected: 04/23/23 1630     Updated: 04/23/23 1648    Narrative:      HISTORY:  Shortness of breath.    FINDINGS:  Frontal film of the chest was obtained.  There is a dual-lead pacing system in  place via the left subclavian venous approach.  The pacing leads appear to be  in  good position.  There is mild pulmonary vascular congestion with mild  cardiomegaly.  No infiltrate or effusion is seen.      Impression:      1.  Mild cardiomegaly with mild pulmonary vascular congestion.    2.  No acute infiltrate or effusion is seen.         No results found for: ACANTHNAEG, AFBCX, BPERTUSSISCX, BLOODCX  No results found for: BCIDPCR, CXREFLEX, CSFCX, CULTURETIS  No results found for: CULTURES, HSVCX, URCX  No results found for: EYECULTURE, GCCX, HSVCULTURE, LABHSV  No results found for: LEGIONELLA, MRSACX, MUMPSCX, MYCOPLASCX  No results found for: NOCARDIACX, STOOLCX  No results found for: THROATCX, UNSTIMCULT, URINECX, CULTURE, VZVCULTUR  No results found for: VIRALCULTU, WOUNDCX  Medication Reviewed and Will Continue Unless Documented in Plan:   [START ON 5/10/2023] !Vancomycin Level Draw Needed, , Does not apply, Once  ascorbic acid, 1,000 mg, Oral, Nightly  cetirizine, 10 mg, Oral, Daily  vitamin D3, 5,000 Units, Oral, Nightly  docusate sodium, 200 mg, Oral, BID  dronedarone, 400 mg, Oral, Daily With Dinner  fluticasone, 2 spray, Each Nare, Daily  fondaparinux, 10 mg, Subcutaneous, Q24H  glipizide, 10 mg, Oral, QAM AC  Insulin Aspart, 2-12 Units, Subcutaneous, 4x Daily With Meals & Nightly  magnesium oxide, 400 mg, Oral, BID  metoprolol succinate XL, 25 mg, Oral, BID  senna-docusate sodium, 2 tablet, Oral, BID  sodium bicarbonate, 650 mg, Oral, TID  sodium chloride, 3 mL, Intravenous, Q12H  sodium chloride, 100 mL/hr, Intravenous, Once  tamsulosin, 0.4 mg, Oral, BID  vancomycin, 1,250 mg, Intravenous, Q12H      Pharmacy to dose vancomycin,       •  acetaminophen  •  bisacodyl  •  calcium carbonate  •  hydrALAZINE  •  hydrOXYzine pamoate  •  muscle rub  •  ondansetron  •  oxyCODONE-acetaminophen  •  Pharmacy to dose vancomycin  •  simethicone  •  sodium chloride  •  traZODone  Pharmacy to dose vancomycin,        Dietary Orders (From admission, onward)     Start     Ordered    04/27/23  1718  Diet: Diabetic Diets, Cardiac Diets; Healthy Heart (2-3 Na+); Consistent Carbohydrate; Texture: Regular Texture (IDDSI 7); Fluid Consistency: Thin (IDDSI 0)  Diet Effective Now        References:    Diet Order [place]   Question Answer Comment   Diets: Diabetic Diets    Diets: Cardiac Diets    Cardiac Diet: Healthy Heart (2-3 Na+)    Diabetic Diet: Consistent Carbohydrate    Texture: Regular Texture (IDDSI 7)    Fluid Consistency: Thin (IDDSI 0)        04/27/23 1718              History, physical exam, assessment and plan may have been partly or fully copied from before, but  changes made to the copied record to reflect care on the date of service. Part of the lab and imaging  reports auto populated and corrected. Some of this note may be an electronic transcription of spoken  language to printed text. This may permit erroneous, or at times, nonsensical words or phrases to be  inadvertently transcribed. Although I have reviewed the note for such errors, some may still exist.      This document has been electronically signed by Charlie Persaud MD on May 9, 2023 12:01 CDT

## 2023-05-10 ENCOUNTER — TELEPHONE (OUTPATIENT)
Dept: PODIATRY | Facility: CLINIC | Age: 63
End: 2023-05-10
Payer: COMMERCIAL

## 2023-05-10 ENCOUNTER — APPOINTMENT (OUTPATIENT)
Dept: GENERAL RADIOLOGY | Facility: HOSPITAL | Age: 63
End: 2023-05-10
Payer: COMMERCIAL

## 2023-05-10 ENCOUNTER — OUTSIDE FACILITY SERVICE (OUTPATIENT)
Dept: PULMONOLOGY | Facility: CLINIC | Age: 63
End: 2023-05-10
Payer: MEDICARE

## 2023-05-10 DIAGNOSIS — M14.672 CHARCOT'S JOINT OF LEFT FOOT: Primary | ICD-10-CM

## 2023-05-10 LAB
ALBUMIN SERPL-MCNC: 3.7 G/DL (ref 3.5–5.2)
ALBUMIN/GLOB SERPL: 1 G/DL
ALP SERPL-CCNC: 118 U/L (ref 39–117)
ALT SERPL W P-5'-P-CCNC: 47 U/L (ref 1–41)
ANION GAP SERPL CALCULATED.3IONS-SCNC: 11 MMOL/L (ref 5–15)
AST SERPL-CCNC: 30 U/L (ref 1–40)
BASOPHILS # BLD AUTO: 0.05 10*3/MM3 (ref 0–0.2)
BASOPHILS NFR BLD AUTO: 1.2 % (ref 0–1.5)
BILIRUB SERPL-MCNC: 0.7 MG/DL (ref 0–1.2)
BUN SERPL-MCNC: 23 MG/DL (ref 8–23)
BUN/CREAT SERPL: 21.7 (ref 7–25)
CALCIUM SPEC-SCNC: 9.2 MG/DL (ref 8.6–10.5)
CHLORIDE SERPL-SCNC: 102 MMOL/L (ref 98–107)
CO2 SERPL-SCNC: 24 MMOL/L (ref 22–29)
CREAT SERPL-MCNC: 1.06 MG/DL (ref 0.76–1.27)
DEPRECATED RDW RBC AUTO: 50.3 FL (ref 37–54)
EGFRCR SERPLBLD CKD-EPI 2021: 79.8 ML/MIN/1.73
EOSINOPHIL # BLD AUTO: 0.15 10*3/MM3 (ref 0–0.4)
EOSINOPHIL NFR BLD AUTO: 3.5 % (ref 0.3–6.2)
ERYTHROCYTE [DISTWIDTH] IN BLOOD BY AUTOMATED COUNT: 14.8 % (ref 12.3–15.4)
GLOBULIN UR ELPH-MCNC: 3.6 GM/DL
GLUCOSE BLDC GLUCOMTR-MCNC: 133 MG/DL (ref 70–130)
GLUCOSE BLDC GLUCOMTR-MCNC: 165 MG/DL (ref 70–130)
GLUCOSE BLDC GLUCOMTR-MCNC: 214 MG/DL (ref 70–130)
GLUCOSE SERPL-MCNC: 193 MG/DL (ref 65–99)
HCT VFR BLD AUTO: 36.6 % (ref 37.5–51)
HGB BLD-MCNC: 11.4 G/DL (ref 13–17.7)
IMM GRANULOCYTES # BLD AUTO: 0.02 10*3/MM3 (ref 0–0.05)
IMM GRANULOCYTES NFR BLD AUTO: 0.5 % (ref 0–0.5)
LYMPHOCYTES # BLD AUTO: 1.25 10*3/MM3 (ref 0.7–3.1)
LYMPHOCYTES NFR BLD AUTO: 28.9 % (ref 19.6–45.3)
MAGNESIUM SERPL-MCNC: 2.1 MG/DL (ref 1.6–2.4)
MCH RBC QN AUTO: 28.6 PG (ref 26.6–33)
MCHC RBC AUTO-ENTMCNC: 31.1 G/DL (ref 31.5–35.7)
MCV RBC AUTO: 92 FL (ref 79–97)
MONOCYTES # BLD AUTO: 0.51 10*3/MM3 (ref 0.1–0.9)
MONOCYTES NFR BLD AUTO: 11.8 % (ref 5–12)
NEUTROPHILS NFR BLD AUTO: 2.35 10*3/MM3 (ref 1.7–7)
NEUTROPHILS NFR BLD AUTO: 54.1 % (ref 42.7–76)
NRBC BLD AUTO-RTO: 0 /100 WBC (ref 0–0.2)
PLATELET # BLD AUTO: 212 10*3/MM3 (ref 140–450)
PMV BLD AUTO: 10.4 FL (ref 6–12)
POTASSIUM SERPL-SCNC: 4.4 MMOL/L (ref 3.5–5.2)
PROT SERPL-MCNC: 7.3 G/DL (ref 6–8.5)
RBC # BLD AUTO: 3.98 10*6/MM3 (ref 4.14–5.8)
SODIUM SERPL-SCNC: 137 MMOL/L (ref 136–145)
VANCOMYCIN TROUGH SERPL-MCNC: 15.7 MCG/ML (ref 5–20)
WBC NRBC COR # BLD: 4.33 10*3/MM3 (ref 3.4–10.8)

## 2023-05-10 PROCEDURE — 80202 ASSAY OF VANCOMYCIN: CPT | Performed by: INTERNAL MEDICINE

## 2023-05-10 PROCEDURE — 97535 SELF CARE MNGMENT TRAINING: CPT

## 2023-05-10 PROCEDURE — 25010000002 FONDAPARINUX PER 0.5 MG: Performed by: INTERNAL MEDICINE

## 2023-05-10 PROCEDURE — 97530 THERAPEUTIC ACTIVITIES: CPT

## 2023-05-10 PROCEDURE — 82948 REAGENT STRIP/BLOOD GLUCOSE: CPT

## 2023-05-10 PROCEDURE — 25010000002 VANCOMYCIN 10 G RECONSTITUTED SOLUTION: Performed by: INTERNAL MEDICINE

## 2023-05-10 PROCEDURE — 83735 ASSAY OF MAGNESIUM: CPT | Performed by: INTERNAL MEDICINE

## 2023-05-10 PROCEDURE — 73610 X-RAY EXAM OF ANKLE: CPT

## 2023-05-10 PROCEDURE — 85025 COMPLETE CBC W/AUTO DIFF WBC: CPT | Performed by: INTERNAL MEDICINE

## 2023-05-10 PROCEDURE — 80053 COMPREHEN METABOLIC PANEL: CPT | Performed by: INTERNAL MEDICINE

## 2023-05-10 PROCEDURE — 97110 THERAPEUTIC EXERCISES: CPT

## 2023-05-10 RX ORDER — BUPIVACAINE HCL/0.9 % NACL/PF 0.1 %
2 PLASTIC BAG, INJECTION (ML) EPIDURAL ONCE
Status: CANCELLED | OUTPATIENT
Start: 2023-05-15 | End: 2023-05-10

## 2023-05-10 NOTE — TELEPHONE ENCOUNTER
PATIENT CALLED AND HE NEEDS TO KNOW WHAT DATE DR BUENO TOLD HIM HE WAS GOING TO DO SURGERY SO HE CAN TELL HIS WIFE.  PLEASE CALL PHONE NUMBER AND IF DOES NOT ANSWER LEAVE DETAILED MESSAGE.

## 2023-05-10 NOTE — PROGRESS NOTES
Pharmacokinetics by Pharmacy - Vancomycin    Emre Lisa is a 63 y.o. male receiving vancomycin day 27 for bone and/or joint infection    Objective:  [Ht: 182.9 cm; Wt: (!) 170 kg (375 lb 11.2 oz)]     WBC   Date Value Ref Range Status   05/10/2023 4.33 3.40 - 10.80 10*3/mm3 Final   05/09/2023 5.76 3.40 - 10.80 10*3/mm3 Final   05/08/2023 4.55 3.40 - 10.80 10*3/mm3 Final    No results found for: CRP, LACTATE   Temp Readings from Last 1 Encounters:   04/21/23 98.6 °F (37 °C)     Estimated Creatinine Clearance: 119 mL/min (by C-G formula based on SCr of 1.06 mg/dL).   Creatinine   Date Value Ref Range Status   05/10/2023 1.06 0.76 - 1.27 mg/dL Final   05/09/2023 1.11 0.76 - 1.27 mg/dL Final   05/08/2023 1.07 0.76 - 1.27 mg/dL Final       Vancomycin Peak   Date Value Ref Range Status   05/09/2023 32.90 20.00 - 40.00 mcg/mL Final     Vancomycin Trough   Date Value Ref Range Status   05/10/2023 15.70 5.00 - 20.00 mcg/mL Final       Culture Results:  Microbiology Results (last 10 days)     ** No results found for the last 240 hours. **        No results found for: RESPCX    Assessment:    WBC 4.33, WNL   Scr 1.06, WNL   No new cultures     Vancomycin peak resulted at 32.9 (goal 30-40) and was drawn 30 minutes after the end of infusion. Vancomycin trough resulted at 15.7 and was drawn appropriately. This gives an AUC of 580.4 (goal 400-600)    Plan:  1. Continue vancomycin 1250mg IV Q12H. Consult ends 5/26/23.  2. Order levels when clinically indicated  3. Pharmacy will monitor renal function and adjust dose accordingly.    Eloy Mendoza [facility]   05/10/23 12:05 CDT

## 2023-05-10 NOTE — ACP (ADVANCE CARE PLANNING)
[facility] @ [facility]  INPATIENT PROGRESS NOTE    PATIENT NAME: Emre Lisa      PHYSICIAN: Charlie Persaud MD  : 1960        MRN: [id number]  Patient Care Team:  Jamaal Bravo MD as PCP - Marco A Chauhan DPM as Consulting Physician (Podiatry)    Chief Complaint:  Continued antibiotic therapy and medical management  History of Present Illness: Patient is a 64 y/o male with a pmh of type 2 DM, hypertension, CAD, and neuropathy.  He recently underwent surgical repair of Charcot deformity with external fixator stabilization to his left foot related to cellulitis.  He tolerated the surgical procedure well.  He will need IV antibiotic therapy through 23 due to bone/joint infection.  He remains non-weight bearing of left lower extremity.  He tells me the pain to his left foot remains controlled, however, he does have a torn left rotator cuff and arthritis so his hip and shoulder are the source of his pain.  He tells me he is doing well and he has no other complaints or concerns.  I have reviewed the patients most recent labs and diagnostics independently.  Glucose 124, BUN 15, creatinine 0.89, sodium 138, potassium 4.2, chloride 105, co2 24, wbc 5.25, hgb 9.9, hct 30.1, platelets 156.       Assessment      Cellulitis of left foot  Charcot's joint of left foot  Type 2 DM  Arthritis  A-fib  Hypertension  Rheumatoid arthritis  DVT & GI prophylaxis     DVT Prophylaxis: Heparin  GI Prophylaxis: Protonix  Code Status: Full    Plan     -Stable at time of exam  -Significant clinical improvement noted.   -Continues to tolerate room air  -Continue fondaparinux.  -Continue therapy we will try to get patient out of bed to chair as tolerated.  -All specialists and consultants follow-up appreciated.  -Patient remains clinically stable and improved.  -DVT and GI prophylaxis in place.  -We'll continue monitoring patient in hospital setting and treat  patient as course dictates.  -Please review orders for detailed plan of care.  -For Acute and Chronic medical condition we will continue medications described below in medication section which have been reviewed and we will continue unless changed in plan of care.  -Laboratory and diagnostic studies have been independently reviewed and the reports are reviewed as documented below.      Subjective   Interval History:   Patient Complaints:   Patient seen and examined.  Resting comfortably in bed.  No overnight events reported.  Pleasant mood.   History taken from: Patient, patient's chart.    Review of Systems:    Review of Systems   Constitutional: Positive for activity change. Negative for chills and fever.   HENT: Negative for postnasal drip and tinnitus.    Respiratory: Negative for apnea.    Cardiovascular: Negative for chest pain and palpitations.   Gastrointestinal: Negative for abdominal distention, nausea and vomiting.   Musculoskeletal: Positive for arthralgias and myalgias.   Skin: Positive for wound.   Neurological: Negative for tremors and seizures.   Psychiatric/Behavioral: Negative for agitation, confusion and hallucinations.       Objective   Vital Signs:  Temp: 98.4 F         Heart Rate: 92         Resp: 20          Blood Pressure: 128/83         Pulse Ox: 95%    Physical Exam:   Physical Exam  Vitals reviewed.   Constitutional:       Appearance: Normal appearance. He is not ill-appearing.   HENT:      Head: Normocephalic and atraumatic.      Mouth/Throat:      Mouth: Mucous membranes are moist.      Pharynx: Oropharynx is clear.   Eyes:      Extraocular Movements: Extraocular movements intact.      Pupils: Pupils are equal, round, and reactive to light.   Neck:      Vascular: No carotid bruit.   Cardiovascular:      Rate and Rhythm: Regular rhythm. Tachycardia present.      Pulses: Normal pulses.      Heart sounds: Normal heart sounds. No murmur heard.    No gallop.   Pulmonary:      Effort:  Pulmonary effort is normal. No respiratory distress.      Breath sounds: Normal breath sounds. No rales.   Abdominal:      General: Bowel sounds are normal. There is no distension.      Palpations: Abdomen is soft.      Tenderness: There is no guarding.   Musculoskeletal:         General: Normal range of motion.      Cervical back: Normal range of motion and neck supple.      Right lower leg: Edema present.      Left lower leg: Edema present.   Skin:     General: Skin is warm and dry.      Capillary Refill: Capillary refill takes less than 2 seconds.      Findings: Bruising present.      Comments: S/P L surgical repair Charcot foot with external fixator.  Wound vac in place   Neurological:      General: No focal deficit present.      Mental Status: He is alert and oriented to person, place, and time.   Psychiatric:         Mood and Affect: Mood normal.         Behavior: Behavior normal.         Thought Content: Thought content normal.         Judgment: Judgment normal.       Results Review:       Results from last 7 days   Lab Units 05/10/23  0320 05/09/23  0606 05/08/23  0529 05/07/23  0702 05/06/23  0621 05/05/23  0624 05/04/23  0625   SODIUM mmol/L 137 138 136 140 136 137 136   POTASSIUM mmol/L 4.4 4.6 4.2 4.8 4.4 4.5 4.4   CHLORIDE mmol/L 102 102 101 104 103 101 102   CO2 mmol/L 24.0 25.0 24.0 25.0 21.0* 25.0 24.0   BUN mg/dL 23 23 22 23 20 20 19   CREATININE mg/dL 1.06 1.11 1.07 1.24 1.00 1.01 1.04   GLUCOSE mg/dL 193* 160* 166* 156* 165* 186* 193*   CALCIUM mg/dL 9.2 8.8 8.5* 8.7 8.6 8.7 8.3*   BILIRUBIN mg/dL 0.7 0.9 0.8 0.6 0.6 0.6 0.8   ALK PHOS U/L 118* 116 114 117 123* 132* 151*   ALT (SGPT) U/L 47* 48* 50* 52* 63* 78* 103*   AST (SGOT) U/L 30 30 30 30 20 21 25     Results from last 7 days   Lab Units 05/10/23  0320 05/09/23  0606 05/08/23  0529 05/07/23  0702 05/06/23  0621 05/05/23  0624 05/04/23  0625   MAGNESIUM mg/dL 2.1 2.1 2.0 2.0 2.1 2.0 2.0     Results from last 7 days   Lab Units 05/10/23  0325  05/09/23  0606 05/08/23  0529 05/07/23  0702 05/06/23  0621 05/05/23  0624 05/04/23  0625   WBC 10*3/mm3 4.33 5.76 4.55 4.54 4.49 4.07 4.36   HEMOGLOBIN g/dL 11.4* 11.0* 10.2* 10.2* 9.9* 9.5* 9.4*   HEMATOCRIT % 36.6* 34.9* 32.2* 32.9* 30.9* 30.6* 30.4*   PLATELETS 10*3/mm3 212 191 209 193 169 169 142     Lab Results   Component Value Date    CKTOTAL 119 04/23/2023    CKMB 2.16 04/23/2023    TROPONINI <0.012 11/19/2014    TROPONINT 52 (H) 04/25/2023     CO2   Date Value Ref Range Status   05/10/2023 24.0 22.0 - 29.0 mmol/L Final         Imaging Results (Most Recent)     Procedure Component Value Units Date/Time    US Venous Doppler Lower Extremity Bilateral (duplex) [[id number]] Collected: 04/28/23 2142     Updated: 04/28/23 2154    Narrative:      EXAMINATION:  Ultrasound bilateral lower extremity venous Doppler.    COMPARISON:  No comparison.    HISTORY:  Positive PE, concern for DVT.    TECHNIQUE:  High-resolution gray scale imaging, color flow Doppler, compression were  performed from the groin to the calf of the bilateral lower extremities.  Augmentation was performed of the bilateral common femoral vein and popliteal  vein.    FINDINGS:  There is DVT within both popliteal veins.  The remaining deep veins appear  patent with normal compressibility.    There is a complex collection in the left popliteal fossa containing shadowing  calcification or two calcifications.      Impression:      1.  Bilateral DVT within the popliteal veins.    2.  Complex cystic area in the left popliteal fossa measuring up to 6.2 cm.  This may represent a complex Baker's cyst with loose bodies or calcifications.    US Renal Bilateral [[id number]] Collected: 04/26/23 0820     Updated: 04/26/23 1208    Narrative:      HISTORY:  Urinary retention.    COMPARISON:  None    TECHNIQUE:  Real-time ultrasound imaging and color Doppler used to evaluate the kidneys and  bladder.    FINDINGS:  The right kidney measures 13.1 x 5.9 x 4.5 cm. The left  kidney measures 13 x 5.8  x 6.5 cm.  Cortical thickness and echotexture are within normal limits. There is  no hydronephrosis or evidence of mass. No calculus was seen.  Lower pole cyst on  the right measuring 4.9 x 4.1 x 4.6 cm.  This appears to be an anechoic  partially exophytic cyst.    The bladder was collapsed around a Colbert catheter.      Impression:      A 5 cm lower pole right renal cyst appears simple.  No acute sonographic  abnormality.      CT Angiogram Chest [[id number]] Collected: 04/26/23 0831     Updated: 04/26/23 1137    Narrative:      EXAM:  CTA chest with contrast, PE protocol.    COMPARISON:  CTA chest PE protocol, 04/24/2023.    HISTORY:  Pulmonary embolism suspected, unknown D-dimer.  Shortness of breath.    TECHNIQUE:  Intravenous contrast was administered and axial images from the thoracic inlet  through the diaphragm were performed followed by 2D multiplanar reformats.  MIPS  were reconstructed and reviewed.    FINDINGS:  Limited by respiratory motion artifact.    Mild bilateral dependent atelectasis.  Lungs are otherwise clear.  No  consolidation.    Trace bilateral pleural effusions.  Esophagus is collapsed and poorly evaluated.  Mild bilateral gynecomastia.  There is some mild lymphadenopathy suspected in  the upper mediastinum, nonspecific.  No lymphadenopathy identified elsewhere in  the chest by size criteria.    Extensive bilateral pulmonary emboli with saddle embolus extending across the  bifurcation of the main pulmonary artery.  There is extensive embolic disease  throughout all lobes of both lungs. The Main  pulmonary artery is enlarged  measuring up to 4.6 cm in diameter.  There is abnormal flattening or even  leftward convexity of the intraventricular septum.    Limited evaluation of the upper abdomen due to motion.  No gross acute osseous  abnormality.      Impression:      1.  Extensive bilateral pulmonary emboli with saddle embolus extending across  the main pulmonary artery  bifurcation.  There is enlargement of the main  pulmonary artery as well as leftward bowing or convexity of the intraventricular  septum.  These findings may be seen in the setting of right heart strain.    2.  The exam is otherwise limited by motion.  There is mild dependent  atelectasis.  Lungs are otherwise grossly clear.    3.  Mild lymphadenopathy in the upper mediastinum, nonspecific.  Consider  reevaluation on short-term follow-up CT chest.        XR Chest 1 View [[id number]] Collected: 04/25/23 0744     Updated: 04/25/23 0902    Narrative:      HISTORY:  Post code.    COMPARISON:  4/23/2023    FINDINGS:  AP view of the chest demonstrates clear lungs and mild cardiomegaly.  There is  no pneumothorax.  Left-sided AICD is unchanged.      Impression:      Mild cardiomegaly.      CT Angiogram Chest [[id number]] Collected: 04/24/23 0511     Updated: 04/24/23 0658    Narrative:      INDICATION:  Positive D-dimer    COMPARISON:  None    TECHNIQUE:  Volumetric CT scan of the chest, from the thoracic inlet to the level of the  diaphragms, with intravenous contrast.  2D axial, sagittal, and coronal  reformats were performed.  MIPS were performed on a separate workstation and  reviewed.    FINDINGS:  Lungs: No focal consolidation or mass.  Pleura: No effusion or pneumothorax.  Vessels: Thoracic aorta is normal in caliber.  Bones: No suspicious lesions.  Visualized upper abdomen: Unremarkable.      Impression:      I suspect there are bilateral pulmonary emboli, but it is difficult to tell with  certainty due to suboptimal contrast timing and significant motion artifact.  Repeat examination is recommended.    XR Chest 1 View [[id number]] Collected: 04/23/23 1630     Updated: 04/23/23 1648    Narrative:      HISTORY:  Shortness of breath.    FINDINGS:  Frontal film of the chest was obtained.  There is a dual-lead pacing system in  place via the left subclavian venous approach.  The pacing leads appear to be in  good  position.  There is mild pulmonary vascular congestion with mild  cardiomegaly.  No infiltrate or effusion is seen.      Impression:      1.  Mild cardiomegaly with mild pulmonary vascular congestion.    2.  No acute infiltrate or effusion is seen.         No results found for: ACANTHNAEG, AFBCX, BPERTUSSISCX, BLOODCX  No results found for: BCIDPCR, CXREFLEX, CSFCX, CULTURETIS  No results found for: CULTURES, HSVCX, URCX  No results found for: EYECULTURE, GCCX, HSVCULTURE, LABHSV  No results found for: LEGIONELLA, MRSACX, MUMPSCX, MYCOPLASCX  No results found for: NOCARDIACX, STOOLCX  No results found for: THROATCX, UNSTIMCULT, URINECX, CULTURE, VZVCULTUR  No results found for: VIRALCULTU, WOUNDCX  Medication Reviewed and Will Continue Unless Documented in Plan:   !Vancomycin Level Draw Needed, , Does not apply, Once  ascorbic acid, 1,000 mg, Oral, Nightly  cetirizine, 10 mg, Oral, Daily  vitamin D3, 5,000 Units, Oral, Nightly  collagenase, 1 application, Topical, Q24H  docusate sodium, 200 mg, Oral, BID  dronedarone, 400 mg, Oral, Daily With Dinner  fluticasone, 2 spray, Each Nare, Daily  fondaparinux, 10 mg, Subcutaneous, Q24H  glipizide, 10 mg, Oral, QAM AC  Insulin Aspart, 2-12 Units, Subcutaneous, 4x Daily With Meals & Nightly  magnesium oxide, 400 mg, Oral, BID  metoprolol succinate XL, 25 mg, Oral, BID  senna-docusate sodium, 2 tablet, Oral, BID  sodium bicarbonate, 650 mg, Oral, TID  sodium chloride, 3 mL, Intravenous, Q12H  sodium chloride, 100 mL/hr, Intravenous, Once  tamsulosin, 0.4 mg, Oral, BID  vancomycin, 1,250 mg, Intravenous, Q12H      Pharmacy to dose vancomycin,       •  acetaminophen  •  bisacodyl  •  calcium carbonate  •  hydrALAZINE  •  hydrOXYzine pamoate  •  muscle rub  •  ondansetron  •  oxyCODONE-acetaminophen  •  Pharmacy to dose vancomycin  •  simethicone  •  sodium chloride  •  traZODone  Pharmacy to dose vancomycin,        Dietary Orders (From admission, onward)     Start     Ordered     04/27/23 1718  Diet: Diabetic Diets, Cardiac Diets; Healthy Heart (2-3 Na+); Consistent Carbohydrate; Texture: Regular Texture (IDDSI 7); Fluid Consistency: Thin (IDDSI 0)  Diet Effective Now        References:    Diet Order [place]   Question Answer Comment   Diets: Diabetic Diets    Diets: Cardiac Diets    Cardiac Diet: Healthy Heart (2-3 Na+)    Diabetic Diet: Consistent Carbohydrate    Texture: Regular Texture (IDDSI 7)    Fluid Consistency: Thin (IDDSI 0)        04/27/23 1718              History, physical exam, assessment and plan may have been partly or fully copied from before, but  changes made to the copied record to reflect care on the date of service. Part of the lab and imaging  reports auto populated and corrected. Some of this note may be an electronic transcription of spoken  language to printed text. This may permit erroneous, or at times, nonsensical words or phrases to be  inadvertently transcribed. Although I have reviewed the note for such errors, some may still exist.      This document has been electronically signed by Charlie Persaud MD on May 10, 2023 10:30 CDT

## 2023-05-11 ENCOUNTER — OUTSIDE FACILITY SERVICE (OUTPATIENT)
Dept: PULMONOLOGY | Facility: CLINIC | Age: 63
End: 2023-05-11
Payer: MEDICARE

## 2023-05-11 LAB
ALBUMIN SERPL-MCNC: 3.7 G/DL (ref 3.5–5.2)
ALBUMIN/GLOB SERPL: 1.1 G/DL
ALP SERPL-CCNC: 114 U/L (ref 39–117)
ALT SERPL W P-5'-P-CCNC: 46 U/L (ref 1–41)
ANION GAP SERPL CALCULATED.3IONS-SCNC: 10 MMOL/L (ref 5–15)
AST SERPL-CCNC: 28 U/L (ref 1–40)
BASOPHILS # BLD AUTO: 0.06 10*3/MM3 (ref 0–0.2)
BASOPHILS NFR BLD AUTO: 1.3 % (ref 0–1.5)
BILIRUB SERPL-MCNC: 0.8 MG/DL (ref 0–1.2)
BUN SERPL-MCNC: 23 MG/DL (ref 8–23)
BUN/CREAT SERPL: 21.5 (ref 7–25)
CALCIUM SPEC-SCNC: 8.9 MG/DL (ref 8.6–10.5)
CHLORIDE SERPL-SCNC: 101 MMOL/L (ref 98–107)
CO2 SERPL-SCNC: 25 MMOL/L (ref 22–29)
CREAT SERPL-MCNC: 1.07 MG/DL (ref 0.76–1.27)
DEPRECATED RDW RBC AUTO: 48.1 FL (ref 37–54)
EGFRCR SERPLBLD CKD-EPI 2021: 79 ML/MIN/1.73
EOSINOPHIL # BLD AUTO: 0.17 10*3/MM3 (ref 0–0.4)
EOSINOPHIL NFR BLD AUTO: 3.7 % (ref 0.3–6.2)
ERYTHROCYTE [DISTWIDTH] IN BLOOD BY AUTOMATED COUNT: 14.7 % (ref 12.3–15.4)
GLOBULIN UR ELPH-MCNC: 3.4 GM/DL
GLUCOSE BLDC GLUCOMTR-MCNC: 154 MG/DL (ref 70–130)
GLUCOSE BLDC GLUCOMTR-MCNC: 156 MG/DL (ref 70–130)
GLUCOSE BLDC GLUCOMTR-MCNC: 166 MG/DL (ref 70–130)
GLUCOSE BLDC GLUCOMTR-MCNC: 178 MG/DL (ref 70–130)
GLUCOSE BLDC GLUCOMTR-MCNC: 230 MG/DL (ref 70–130)
GLUCOSE SERPL-MCNC: 150 MG/DL (ref 65–99)
HCT VFR BLD AUTO: 36.4 % (ref 37.5–51)
HGB BLD-MCNC: 11.5 G/DL (ref 13–17.7)
IMM GRANULOCYTES # BLD AUTO: 0.01 10*3/MM3 (ref 0–0.05)
IMM GRANULOCYTES NFR BLD AUTO: 0.2 % (ref 0–0.5)
LYMPHOCYTES # BLD AUTO: 1.25 10*3/MM3 (ref 0.7–3.1)
LYMPHOCYTES NFR BLD AUTO: 27.2 % (ref 19.6–45.3)
MAGNESIUM SERPL-MCNC: 2 MG/DL (ref 1.6–2.4)
MCH RBC QN AUTO: 28.5 PG (ref 26.6–33)
MCHC RBC AUTO-ENTMCNC: 31.6 G/DL (ref 31.5–35.7)
MCV RBC AUTO: 90.1 FL (ref 79–97)
MONOCYTES # BLD AUTO: 0.49 10*3/MM3 (ref 0.1–0.9)
MONOCYTES NFR BLD AUTO: 10.7 % (ref 5–12)
NEUTROPHILS NFR BLD AUTO: 2.62 10*3/MM3 (ref 1.7–7)
NEUTROPHILS NFR BLD AUTO: 56.9 % (ref 42.7–76)
NRBC BLD AUTO-RTO: 0 /100 WBC (ref 0–0.2)
PLATELET # BLD AUTO: 204 10*3/MM3 (ref 140–450)
PMV BLD AUTO: 10.1 FL (ref 6–12)
POTASSIUM SERPL-SCNC: 4.2 MMOL/L (ref 3.5–5.2)
PROT SERPL-MCNC: 7.1 G/DL (ref 6–8.5)
RBC # BLD AUTO: 4.04 10*6/MM3 (ref 4.14–5.8)
SODIUM SERPL-SCNC: 136 MMOL/L (ref 136–145)
WBC NRBC COR # BLD: 4.6 10*3/MM3 (ref 3.4–10.8)

## 2023-05-11 PROCEDURE — 80053 COMPREHEN METABOLIC PANEL: CPT | Performed by: INTERNAL MEDICINE

## 2023-05-11 PROCEDURE — 97110 THERAPEUTIC EXERCISES: CPT

## 2023-05-11 PROCEDURE — 97530 THERAPEUTIC ACTIVITIES: CPT

## 2023-05-11 PROCEDURE — 83735 ASSAY OF MAGNESIUM: CPT | Performed by: INTERNAL MEDICINE

## 2023-05-11 PROCEDURE — 82948 REAGENT STRIP/BLOOD GLUCOSE: CPT

## 2023-05-11 PROCEDURE — 85025 COMPLETE CBC W/AUTO DIFF WBC: CPT | Performed by: INTERNAL MEDICINE

## 2023-05-11 PROCEDURE — 25010000002 VANCOMYCIN 10 G RECONSTITUTED SOLUTION: Performed by: INTERNAL MEDICINE

## 2023-05-11 PROCEDURE — 97535 SELF CARE MNGMENT TRAINING: CPT

## 2023-05-11 PROCEDURE — 25010000002 FONDAPARINUX PER 0.5 MG: Performed by: INTERNAL MEDICINE

## 2023-05-11 NOTE — PROGRESS NOTES
Pharmacokinetics by Pharmacy - Vancomycin    Emre Lisa is a 63 y.o. male receiving vancomycin 1250 mg IV q12hr (day 27) for bone and/or joint infection.    Objective:    Temp Readings from Last 1 Encounters:   04/21/23 98.6 °F (37 °C)     WBC   Date Value Ref Range Status   05/11/2023 4.60 3.40 - 10.80 10*3/mm3 Final   05/10/2023 4.33 3.40 - 10.80 10*3/mm3 Final   05/09/2023 5.76 3.40 - 10.80 10*3/mm3 Final    No results found for: CRP, LACTATE   Creatinine   Date Value Ref Range Status   05/11/2023 1.07 0.76 - 1.27 mg/dL Final   05/10/2023 1.06 0.76 - 1.27 mg/dL Final   05/09/2023 1.11 0.76 - 1.27 mg/dL Final       Vancomycin Peak   Date Value Ref Range Status   05/09/2023 32.90 20.00 - 40.00 mcg/mL Final     Vancomycin Trough   Date Value Ref Range Status   05/10/2023 15.70 5.00 - 20.00 mcg/mL Final       Culture Results:  Microbiology Results (last 10 days)     ** No results found for the last 240 hours. **        No results found for: RESPCX    [Ht:  ; Wt: (!) 170 kg (375 lb 11.2 oz)]   Body mass index is 50.95 kg/m².  Ideal body weight: 77.6 kg (171 lb 1.2 oz)  Adjusted ideal body weight: 115 kg (252 lb 14.8 oz)      Assessment:  • WBCs WNL  • Creatinine WNL.   • Vancomycin WNL on 5/10   • AUC and trough goals are 400-600 and 10-20 mcg/mL, respectively.     Plan:  1. Continue Vancomycin 1250 mg IV q12hrs  2. Vancomycin levels when clinically indicated. Consulted until 5/26.  3. Pharmacy will monitor renal function and adjust dose accordingly.    Thank you for this consult.     Estiven Cannon [facility]   05/11/23 12:10 CDT

## 2023-05-12 ENCOUNTER — OUTSIDE FACILITY SERVICE (OUTPATIENT)
Dept: PULMONOLOGY | Facility: CLINIC | Age: 63
End: 2023-05-12
Payer: MEDICARE

## 2023-05-12 DIAGNOSIS — M14.672 CHARCOT'S JOINT OF LEFT FOOT: Primary | ICD-10-CM

## 2023-05-12 LAB
ALBUMIN SERPL-MCNC: 3.7 G/DL (ref 3.5–5.2)
ALBUMIN/GLOB SERPL: 1.2 G/DL
ALP SERPL-CCNC: 106 U/L (ref 39–117)
ALT SERPL W P-5'-P-CCNC: 41 U/L (ref 1–41)
ANION GAP SERPL CALCULATED.3IONS-SCNC: 10 MMOL/L (ref 5–15)
AST SERPL-CCNC: 29 U/L (ref 1–40)
BASOPHILS # BLD AUTO: 0.04 10*3/MM3 (ref 0–0.2)
BASOPHILS NFR BLD AUTO: 0.9 % (ref 0–1.5)
BILIRUB SERPL-MCNC: 0.7 MG/DL (ref 0–1.2)
BUN SERPL-MCNC: 28 MG/DL (ref 8–23)
BUN/CREAT SERPL: 26.2 (ref 7–25)
CALCIUM SPEC-SCNC: 8.8 MG/DL (ref 8.6–10.5)
CHLORIDE SERPL-SCNC: 103 MMOL/L (ref 98–107)
CO2 SERPL-SCNC: 24 MMOL/L (ref 22–29)
CREAT SERPL-MCNC: 1.07 MG/DL (ref 0.76–1.27)
DEPRECATED RDW RBC AUTO: 49.5 FL (ref 37–54)
EGFRCR SERPLBLD CKD-EPI 2021: 79 ML/MIN/1.73
EOSINOPHIL # BLD AUTO: 0.14 10*3/MM3 (ref 0–0.4)
EOSINOPHIL NFR BLD AUTO: 3.2 % (ref 0.3–6.2)
ERYTHROCYTE [DISTWIDTH] IN BLOOD BY AUTOMATED COUNT: 14.8 % (ref 12.3–15.4)
GLOBULIN UR ELPH-MCNC: 3.1 GM/DL
GLUCOSE BLDC GLUCOMTR-MCNC: 120 MG/DL (ref 70–130)
GLUCOSE BLDC GLUCOMTR-MCNC: 149 MG/DL (ref 70–130)
GLUCOSE BLDC GLUCOMTR-MCNC: 211 MG/DL (ref 70–130)
GLUCOSE BLDC GLUCOMTR-MCNC: 222 MG/DL (ref 70–130)
GLUCOSE SERPL-MCNC: 218 MG/DL (ref 65–99)
HCT VFR BLD AUTO: 36.4 % (ref 37.5–51)
HGB BLD-MCNC: 11.3 G/DL (ref 13–17.7)
IMM GRANULOCYTES # BLD AUTO: 0.02 10*3/MM3 (ref 0–0.05)
IMM GRANULOCYTES NFR BLD AUTO: 0.5 % (ref 0–0.5)
LYMPHOCYTES # BLD AUTO: 1.41 10*3/MM3 (ref 0.7–3.1)
LYMPHOCYTES NFR BLD AUTO: 32.6 % (ref 19.6–45.3)
MAGNESIUM SERPL-MCNC: 2.1 MG/DL (ref 1.6–2.4)
MCH RBC QN AUTO: 28.1 PG (ref 26.6–33)
MCHC RBC AUTO-ENTMCNC: 31 G/DL (ref 31.5–35.7)
MCV RBC AUTO: 90.5 FL (ref 79–97)
MONOCYTES # BLD AUTO: 0.5 10*3/MM3 (ref 0.1–0.9)
MONOCYTES NFR BLD AUTO: 11.5 % (ref 5–12)
NEUTROPHILS NFR BLD AUTO: 2.22 10*3/MM3 (ref 1.7–7)
NEUTROPHILS NFR BLD AUTO: 51.3 % (ref 42.7–76)
NRBC BLD AUTO-RTO: 0 /100 WBC (ref 0–0.2)
PLATELET # BLD AUTO: 218 10*3/MM3 (ref 140–450)
PMV BLD AUTO: 10.2 FL (ref 6–12)
POTASSIUM SERPL-SCNC: 4.2 MMOL/L (ref 3.5–5.2)
PROT SERPL-MCNC: 6.8 G/DL (ref 6–8.5)
RBC # BLD AUTO: 4.02 10*6/MM3 (ref 4.14–5.8)
SODIUM SERPL-SCNC: 137 MMOL/L (ref 136–145)
WBC NRBC COR # BLD: 4.33 10*3/MM3 (ref 3.4–10.8)

## 2023-05-12 PROCEDURE — 97530 THERAPEUTIC ACTIVITIES: CPT

## 2023-05-12 PROCEDURE — 80053 COMPREHEN METABOLIC PANEL: CPT | Performed by: INTERNAL MEDICINE

## 2023-05-12 PROCEDURE — 83735 ASSAY OF MAGNESIUM: CPT | Performed by: INTERNAL MEDICINE

## 2023-05-12 PROCEDURE — 25010000002 VANCOMYCIN 10 G RECONSTITUTED SOLUTION: Performed by: INTERNAL MEDICINE

## 2023-05-12 PROCEDURE — 25010000002 FONDAPARINUX PER 0.5 MG: Performed by: INTERNAL MEDICINE

## 2023-05-12 PROCEDURE — 82948 REAGENT STRIP/BLOOD GLUCOSE: CPT

## 2023-05-12 PROCEDURE — 97535 SELF CARE MNGMENT TRAINING: CPT

## 2023-05-12 PROCEDURE — 85025 COMPLETE CBC W/AUTO DIFF WBC: CPT | Performed by: INTERNAL MEDICINE

## 2023-05-12 RX ORDER — BUPIVACAINE HCL/0.9 % NACL/PF 0.1 %
2 PLASTIC BAG, INJECTION (ML) EPIDURAL ONCE
Status: CANCELLED | OUTPATIENT
Start: 2023-05-15 | End: 2023-05-12

## 2023-05-12 NOTE — PROGRESS NOTES
Pharmacokinetics by Pharmacy - Vancomycin    Emre Lisa is a 63 y.o. male receiving vancomycin 1250mg IV Q12H day 28 for bone and/or joint infection    Objective:  [Ht: 182.9 cm; Wt: (!) 170 kg (375 lb 11.2 oz)]     WBC   Date Value Ref Range Status   05/12/2023 4.33 3.40 - 10.80 10*3/mm3 Final   05/11/2023 4.60 3.40 - 10.80 10*3/mm3 Final   05/10/2023 4.33 3.40 - 10.80 10*3/mm3 Final    No results found for: CRP, LACTATE   Temp Readings from Last 1 Encounters:   04/21/23 98.6 °F (37 °C)     Estimated Creatinine Clearance: 117.9 mL/min (by C-G formula based on SCr of 1.07 mg/dL).   Creatinine   Date Value Ref Range Status   05/12/2023 1.07 0.76 - 1.27 mg/dL Final   05/11/2023 1.07 0.76 - 1.27 mg/dL Final   05/10/2023 1.06 0.76 - 1.27 mg/dL Final       Vancomycin Peak   Date Value Ref Range Status   05/09/2023 32.90 20.00 - 40.00 mcg/mL Final     Vancomycin Trough   Date Value Ref Range Status   05/10/2023 15.70 5.00 - 20.00 mcg/mL Final       Culture Results:  Microbiology Results (last 10 days)     ** No results found for the last 240 hours. **        No results found for: RESPCX    Assessment:    WBC 4.33, WNL  Scr 1.07, WNL and stable  No new cultures    Levels WNL on 5/10. Vancomycin trough and AUC goals 10-20 and 400-600, respectively.    Plan:  1. Continue vancomycin 1250mg IV Q12H. Consult ends 5/26/23.  2. Will order levels when clinically indicated  3. Pharmacy will monitor renal function and adjust dose accordingly.      Eloy Mendoza [facility]   05/12/23 11:48 CDT

## 2023-05-13 ENCOUNTER — OUTSIDE FACILITY SERVICE (OUTPATIENT)
Dept: PULMONOLOGY | Facility: CLINIC | Age: 63
End: 2023-05-13
Payer: MEDICARE

## 2023-05-13 LAB
ALBUMIN SERPL-MCNC: 3.9 G/DL (ref 3.5–5.2)
ALBUMIN/GLOB SERPL: 1.2 G/DL
ALP SERPL-CCNC: 109 U/L (ref 39–117)
ALT SERPL W P-5'-P-CCNC: 40 U/L (ref 1–41)
ANION GAP SERPL CALCULATED.3IONS-SCNC: 10 MMOL/L (ref 5–15)
AST SERPL-CCNC: 27 U/L (ref 1–40)
BASOPHILS # BLD AUTO: 0.06 10*3/MM3 (ref 0–0.2)
BASOPHILS NFR BLD AUTO: 1.3 % (ref 0–1.5)
BILIRUB SERPL-MCNC: 0.8 MG/DL (ref 0–1.2)
BUN SERPL-MCNC: 26 MG/DL (ref 8–23)
BUN/CREAT SERPL: 24.5 (ref 7–25)
CALCIUM SPEC-SCNC: 9.2 MG/DL (ref 8.6–10.5)
CHLORIDE SERPL-SCNC: 103 MMOL/L (ref 98–107)
CO2 SERPL-SCNC: 26 MMOL/L (ref 22–29)
CREAT SERPL-MCNC: 1.06 MG/DL (ref 0.76–1.27)
DEPRECATED RDW RBC AUTO: 47.9 FL (ref 37–54)
EGFRCR SERPLBLD CKD-EPI 2021: 79.8 ML/MIN/1.73
EOSINOPHIL # BLD AUTO: 0.16 10*3/MM3 (ref 0–0.4)
EOSINOPHIL NFR BLD AUTO: 3.5 % (ref 0.3–6.2)
ERYTHROCYTE [DISTWIDTH] IN BLOOD BY AUTOMATED COUNT: 14.6 % (ref 12.3–15.4)
GLOBULIN UR ELPH-MCNC: 3.3 GM/DL
GLUCOSE BLDC GLUCOMTR-MCNC: 145 MG/DL (ref 70–130)
GLUCOSE BLDC GLUCOMTR-MCNC: 146 MG/DL (ref 70–130)
GLUCOSE BLDC GLUCOMTR-MCNC: 168 MG/DL (ref 70–130)
GLUCOSE BLDC GLUCOMTR-MCNC: 208 MG/DL (ref 70–130)
GLUCOSE SERPL-MCNC: 150 MG/DL (ref 65–99)
HCT VFR BLD AUTO: 36.2 % (ref 37.5–51)
HGB BLD-MCNC: 11.4 G/DL (ref 13–17.7)
IMM GRANULOCYTES # BLD AUTO: 0.02 10*3/MM3 (ref 0–0.05)
IMM GRANULOCYTES NFR BLD AUTO: 0.4 % (ref 0–0.5)
LYMPHOCYTES # BLD AUTO: 1.39 10*3/MM3 (ref 0.7–3.1)
LYMPHOCYTES NFR BLD AUTO: 30.7 % (ref 19.6–45.3)
MAGNESIUM SERPL-MCNC: 1.9 MG/DL (ref 1.6–2.4)
MCH RBC QN AUTO: 28.3 PG (ref 26.6–33)
MCHC RBC AUTO-ENTMCNC: 31.5 G/DL (ref 31.5–35.7)
MCV RBC AUTO: 89.8 FL (ref 79–97)
MONOCYTES # BLD AUTO: 0.53 10*3/MM3 (ref 0.1–0.9)
MONOCYTES NFR BLD AUTO: 11.7 % (ref 5–12)
NEUTROPHILS NFR BLD AUTO: 2.37 10*3/MM3 (ref 1.7–7)
NEUTROPHILS NFR BLD AUTO: 52.4 % (ref 42.7–76)
NRBC BLD AUTO-RTO: 0 /100 WBC (ref 0–0.2)
PLATELET # BLD AUTO: 203 10*3/MM3 (ref 140–450)
PMV BLD AUTO: 10.2 FL (ref 6–12)
POTASSIUM SERPL-SCNC: 4.4 MMOL/L (ref 3.5–5.2)
PROT SERPL-MCNC: 7.2 G/DL (ref 6–8.5)
RBC # BLD AUTO: 4.03 10*6/MM3 (ref 4.14–5.8)
SODIUM SERPL-SCNC: 139 MMOL/L (ref 136–145)
WBC NRBC COR # BLD: 4.53 10*3/MM3 (ref 3.4–10.8)

## 2023-05-13 PROCEDURE — 25010000002 VANCOMYCIN 10 G RECONSTITUTED SOLUTION: Performed by: INTERNAL MEDICINE

## 2023-05-13 PROCEDURE — 80053 COMPREHEN METABOLIC PANEL: CPT | Performed by: INTERNAL MEDICINE

## 2023-05-13 PROCEDURE — 83735 ASSAY OF MAGNESIUM: CPT | Performed by: INTERNAL MEDICINE

## 2023-05-13 PROCEDURE — 85025 COMPLETE CBC W/AUTO DIFF WBC: CPT | Performed by: INTERNAL MEDICINE

## 2023-05-13 PROCEDURE — 97110 THERAPEUTIC EXERCISES: CPT

## 2023-05-13 PROCEDURE — 82948 REAGENT STRIP/BLOOD GLUCOSE: CPT

## 2023-05-13 PROCEDURE — 25010000002 FONDAPARINUX PER 0.5 MG: Performed by: INTERNAL MEDICINE

## 2023-05-13 PROCEDURE — 97530 THERAPEUTIC ACTIVITIES: CPT

## 2023-05-13 NOTE — ACP (ADVANCE CARE PLANNING)
[facility] @ [facility]  INPATIENT PROGRESS NOTE    PATIENT NAME: Emre Lisa      PHYSICIAN: Charlie Persaud MD  : 1960        MRN: [id number]  Patient Care Team:  Jamaal Bravo MD as PCP - Marco A Chauhan DPM as Consulting Physician (Podiatry)    Chief Complaint:  Continued antibiotic therapy and medical management  History of Present Illness: Patient is a 62 y/o male with a pmh of type 2 DM, hypertension, CAD, and neuropathy.  He recently underwent surgical repair of Charcot deformity with external fixator stabilization to his left foot related to cellulitis.  He tolerated the surgical procedure well.  He will need IV antibiotic therapy through 23 due to bone/joint infection.  He remains non-weight bearing of left lower extremity.  He tells me the pain to his left foot remains controlled, however, he does have a torn left rotator cuff and arthritis so his hip and shoulder are the source of his pain.  He tells me he is doing well and he has no other complaints or concerns.  I have reviewed the patients most recent labs and diagnostics independently.  Glucose 124, BUN 15, creatinine 0.89, sodium 138, potassium 4.2, chloride 105, co2 24, wbc 5.25, hgb 9.9, hct 30.1, platelets 156.       Assessment      Cellulitis of left foot  Charcot's joint of left foot  Type 2 DM  Arthritis  A-fib  Hypertension  Rheumatoid arthritis  DVT & GI prophylaxis     DVT Prophylaxis: Heparin  GI Prophylaxis: Protonix  Code Status: Full    Plan     -Stable at time of exam  -Significant clinical improvement noted.   -Continues to tolerate room air  -Continue fondaparinux.  -Continue therapy we will try to get patient out of bed to chair as tolerated.  -All specialists and consultants follow-up appreciated.  -Patient remains clinically stable and improved.  -Plans for surgical procedure via Dr. Thompson Monday.  -DVT and GI prophylaxis in place.  -We'll  continue monitoring patient in hospital setting and treat patient as course dictates.  -Please review orders for detailed plan of care.  -For Acute and Chronic medical condition we will continue medications described below in medication section which have been reviewed and we will continue unless changed in plan of care.  -Laboratory and diagnostic studies have been independently reviewed and the reports are reviewed as documented below.      Subjective   Interval History:   Patient Complaints:   Patient seen and examined.  Resting comfortably in bed.  Pleasant.  No major concerns at this time.  No overnight events reported.  History taken from: Patient, patient's chart.    Review of Systems:    Review of Systems   Constitutional: Positive for activity change. Negative for chills and fever.   HENT: Negative for postnasal drip and tinnitus.    Respiratory: Negative for apnea.    Cardiovascular: Negative for chest pain and palpitations.   Gastrointestinal: Negative for abdominal distention, nausea and vomiting.   Musculoskeletal: Positive for arthralgias and myalgias.   Skin: Positive for wound.   Neurological: Negative for tremors and seizures.   Psychiatric/Behavioral: Negative for agitation, confusion and hallucinations.       Objective   Vital Signs:  Temp: 97.9 F         Heart Rate: 82         Resp: 18          Blood Pressure: 111/56         Pulse Ox: 94%    Physical Exam:   Physical Exam  Vitals reviewed.   Constitutional:       Appearance: Normal appearance. He is not ill-appearing.   HENT:      Head: Normocephalic and atraumatic.      Mouth/Throat:      Mouth: Mucous membranes are moist.      Pharynx: Oropharynx is clear.   Eyes:      Extraocular Movements: Extraocular movements intact.      Pupils: Pupils are equal, round, and reactive to light.   Neck:      Vascular: No carotid bruit.   Cardiovascular:      Rate and Rhythm: Regular rhythm. Tachycardia present.      Pulses: Normal pulses.      Heart sounds:  Normal heart sounds. No murmur heard.    No gallop.   Pulmonary:      Effort: Pulmonary effort is normal. No respiratory distress.      Breath sounds: Normal breath sounds. No rales.   Abdominal:      General: Bowel sounds are normal. There is no distension.      Palpations: Abdomen is soft.      Tenderness: There is no guarding.   Musculoskeletal:         General: Normal range of motion.      Cervical back: Normal range of motion and neck supple.      Right lower leg: Edema present.      Left lower leg: Edema present.   Skin:     General: Skin is warm and dry.      Capillary Refill: Capillary refill takes less than 2 seconds.      Findings: Bruising present.      Comments: S/P L surgical repair Charcot foot with external fixator.  Wound vac in place   Neurological:      General: No focal deficit present.      Mental Status: He is alert and oriented to person, place, and time.   Psychiatric:         Mood and Affect: Mood normal.         Behavior: Behavior normal.         Thought Content: Thought content normal.         Judgment: Judgment normal.       Results Review:       Results from last 7 days   Lab Units 05/13/23  0534 05/12/23  0530 05/11/23  0611 05/10/23  0320 05/09/23  0606 05/08/23  0529 05/07/23  0702   SODIUM mmol/L 139 137 136 137 138 136 140   POTASSIUM mmol/L 4.4 4.2 4.2 4.4 4.6 4.2 4.8   CHLORIDE mmol/L 103 103 101 102 102 101 104   CO2 mmol/L 26.0 24.0 25.0 24.0 25.0 24.0 25.0   BUN mg/dL 26* 28* 23 23 23 22 23   CREATININE mg/dL 1.06 1.07 1.07 1.06 1.11 1.07 1.24   GLUCOSE mg/dL 150* 218* 150* 193* 160* 166* 156*   CALCIUM mg/dL 9.2 8.8 8.9 9.2 8.8 8.5* 8.7   BILIRUBIN mg/dL 0.8 0.7 0.8 0.7 0.9 0.8 0.6   ALK PHOS U/L 109 106 114 118* 116 114 117   ALT (SGPT) U/L 40 41 46* 47* 48* 50* 52*   AST (SGOT) U/L 27 29 28 30 30 30 30     Results from last 7 days   Lab Units 05/13/23  0534 05/12/23  0530 05/11/23  0611 05/10/23  0320 05/09/23  0606 05/08/23  0529 05/07/23  0702   MAGNESIUM mg/dL 1.9 2.1 2.0  2.1 2.1 2.0 2.0     Results from last 7 days   Lab Units 05/13/23  0533 05/12/23  0530 05/11/23  0611 05/10/23  0320 05/09/23  0606 05/08/23  0529 05/07/23  0702   WBC 10*3/mm3 4.53 4.33 4.60 4.33 5.76 4.55 4.54   HEMOGLOBIN g/dL 11.4* 11.3* 11.5* 11.4* 11.0* 10.2* 10.2*   HEMATOCRIT % 36.2* 36.4* 36.4* 36.6* 34.9* 32.2* 32.9*   PLATELETS 10*3/mm3 203 218 204 212 191 209 193     Lab Results   Component Value Date    CKTOTAL 119 04/23/2023    CKMB 2.16 04/23/2023    TROPONINI <0.012 11/19/2014    TROPONINT 52 (H) 04/25/2023     CO2   Date Value Ref Range Status   05/13/2023 26.0 22.0 - 29.0 mmol/L Final         Imaging Results (Most Recent)     Procedure Component Value Units Date/Time    XR Ankle 3+ View Left [[id number]] Collected: 05/10/23 1948     Updated: 05/10/23 1955    Narrative:      TECHNIQUE:  AP, oblique and lateral views of the left ankle were obtained.    HISTORY:  Follow-up    COMPARISON:  None.    FINDINGS:  Extreme artifact related to external fixator device.  Surgical skin staples  remain in place.    There has been resection of the distal fibula.  Destructive change about the  ankle joint with some callus formation.  I do not have prior studies for  comparison.  Surgical skin staples remain in place.        US Venous Doppler Lower Extremity Bilateral (duplex) [[id number]] Collected: 04/28/23 2142     Updated: 04/28/23 2154    Narrative:      EXAMINATION:  Ultrasound bilateral lower extremity venous Doppler.    COMPARISON:  No comparison.    HISTORY:  Positive PE, concern for DVT.    TECHNIQUE:  High-resolution gray scale imaging, color flow Doppler, compression were  performed from the groin to the calf of the bilateral lower extremities.  Augmentation was performed of the bilateral common femoral vein and popliteal  vein.    FINDINGS:  There is DVT within both popliteal veins.  The remaining deep veins appear  patent with normal compressibility.    There is a complex collection in the left popliteal  fossa containing shadowing  calcification or two calcifications.      Impression:      1.  Bilateral DVT within the popliteal veins.    2.  Complex cystic area in the left popliteal fossa measuring up to 6.2 cm.  This may represent a complex Baker's cyst with loose bodies or calcifications.    US Renal Bilateral [[id number]] Collected: 04/26/23 0820     Updated: 04/26/23 1208    Narrative:      HISTORY:  Urinary retention.    COMPARISON:  None    TECHNIQUE:  Real-time ultrasound imaging and color Doppler used to evaluate the kidneys and  bladder.    FINDINGS:  The right kidney measures 13.1 x 5.9 x 4.5 cm. The left kidney measures 13 x 5.8  x 6.5 cm.  Cortical thickness and echotexture are within normal limits. There is  no hydronephrosis or evidence of mass. No calculus was seen.  Lower pole cyst on  the right measuring 4.9 x 4.1 x 4.6 cm.  This appears to be an anechoic  partially exophytic cyst.    The bladder was collapsed around a Colbert catheter.      Impression:      A 5 cm lower pole right renal cyst appears simple.  No acute sonographic  abnormality.      CT Angiogram Chest [[id number]] Collected: 04/26/23 0831     Updated: 04/26/23 1137    Narrative:      EXAM:  CTA chest with contrast, PE protocol.    COMPARISON:  CTA chest PE protocol, 04/24/2023.    HISTORY:  Pulmonary embolism suspected, unknown D-dimer.  Shortness of breath.    TECHNIQUE:  Intravenous contrast was administered and axial images from the thoracic inlet  through the diaphragm were performed followed by 2D multiplanar reformats.  MIPS  were reconstructed and reviewed.    FINDINGS:  Limited by respiratory motion artifact.    Mild bilateral dependent atelectasis.  Lungs are otherwise clear.  No  consolidation.    Trace bilateral pleural effusions.  Esophagus is collapsed and poorly evaluated.  Mild bilateral gynecomastia.  There is some mild lymphadenopathy suspected in  the upper mediastinum, nonspecific.  No lymphadenopathy identified  elsewhere in  the chest by size criteria.    Extensive bilateral pulmonary emboli with saddle embolus extending across the  bifurcation of the main pulmonary artery.  There is extensive embolic disease  throughout all lobes of both lungs. The Main  pulmonary artery is enlarged  measuring up to 4.6 cm in diameter.  There is abnormal flattening or even  leftward convexity of the intraventricular septum.    Limited evaluation of the upper abdomen due to motion.  No gross acute osseous  abnormality.      Impression:      1.  Extensive bilateral pulmonary emboli with saddle embolus extending across  the main pulmonary artery bifurcation.  There is enlargement of the main  pulmonary artery as well as leftward bowing or convexity of the intraventricular  septum.  These findings may be seen in the setting of right heart strain.    2.  The exam is otherwise limited by motion.  There is mild dependent  atelectasis.  Lungs are otherwise grossly clear.    3.  Mild lymphadenopathy in the upper mediastinum, nonspecific.  Consider  reevaluation on short-term follow-up CT chest.        XR Chest 1 View [[id number]] Collected: 04/25/23 0744     Updated: 04/25/23 0902    Narrative:      HISTORY:  Post code.    COMPARISON:  4/23/2023    FINDINGS:  AP view of the chest demonstrates clear lungs and mild cardiomegaly.  There is  no pneumothorax.  Left-sided AICD is unchanged.      Impression:      Mild cardiomegaly.      CT Angiogram Chest [[id number]] Collected: 04/24/23 0511     Updated: 04/24/23 0658    Narrative:      INDICATION:  Positive D-dimer    COMPARISON:  None    TECHNIQUE:  Volumetric CT scan of the chest, from the thoracic inlet to the level of the  diaphragms, with intravenous contrast.  2D axial, sagittal, and coronal  reformats were performed.  MIPS were performed on a separate workstation and  reviewed.    FINDINGS:  Lungs: No focal consolidation or mass.  Pleura: No effusion or pneumothorax.  Vessels: Thoracic aorta is  normal in caliber.  Bones: No suspicious lesions.  Visualized upper abdomen: Unremarkable.      Impression:      I suspect there are bilateral pulmonary emboli, but it is difficult to tell with  certainty due to suboptimal contrast timing and significant motion artifact.  Repeat examination is recommended.    XR Chest 1 View [[id number]] Collected: 04/23/23 1630     Updated: 04/23/23 1648    Narrative:      HISTORY:  Shortness of breath.    FINDINGS:  Frontal film of the chest was obtained.  There is a dual-lead pacing system in  place via the left subclavian venous approach.  The pacing leads appear to be in  good position.  There is mild pulmonary vascular congestion with mild  cardiomegaly.  No infiltrate or effusion is seen.      Impression:      1.  Mild cardiomegaly with mild pulmonary vascular congestion.    2.  No acute infiltrate or effusion is seen.         No results found for: ACANTHNAEG, AFBCX, BPERTUSSISCX, BLOODCX  No results found for: BCIDPCR, CXREFLEX, CSFCX, CULTURETIS  No results found for: CULTURES, HSVCX, URCX  No results found for: EYECULTURE, GCCX, HSVCULTURE, LABHSV  No results found for: LEGIONELLA, MRSACX, MUMPSCX, MYCOPLASCX  No results found for: NOCARDIACX, STOOLCX  No results found for: THROATCX, UNSTIMCULT, URINECX, CULTURE, VZVCULTUR  No results found for: VIRALCULTU, WOUNDCX  Medication Reviewed and Will Continue Unless Documented in Plan:   ascorbic acid, 1,000 mg, Oral, Nightly  cetirizine, 10 mg, Oral, Daily  vitamin D3, 5,000 Units, Oral, Nightly  collagenase, 1 application, Topical, Q24H  docusate sodium, 200 mg, Oral, BID  dronedarone, 400 mg, Oral, Daily With Dinner  fluticasone, 2 spray, Each Nare, Daily  fondaparinux, 10 mg, Subcutaneous, Q24H  glipizide, 10 mg, Oral, QAM AC  Insulin Aspart, 2-12 Units, Subcutaneous, 4x Daily With Meals & Nightly  magnesium oxide, 400 mg, Oral, BID  metoprolol succinate XL, 25 mg, Oral, BID  senna-docusate sodium, 2 tablet, Oral, BID  sodium  bicarbonate, 650 mg, Oral, TID  sodium chloride, 3 mL, Intravenous, Q12H  sodium chloride, 100 mL/hr, Intravenous, Once  tamsulosin, 0.4 mg, Oral, BID  vancomycin, 1,250 mg, Intravenous, Q12H      Pharmacy to dose vancomycin,       •  acetaminophen  •  bisacodyl  •  calcium carbonate  •  hydrALAZINE  •  hydrOXYzine pamoate  •  muscle rub  •  ondansetron  •  Pharmacy to dose vancomycin  •  simethicone  •  sodium chloride  •  traZODone  Pharmacy to dose vancomycin,        Dietary Orders (From admission, onward)     Start     Ordered    04/27/23 1718  Diet: Diabetic Diets, Cardiac Diets; Healthy Heart (2-3 Na+); Consistent Carbohydrate; Texture: Regular Texture (IDDSI 7); Fluid Consistency: Thin (IDDSI 0)  Diet Effective Now        References:    Diet Order [place]   Question Answer Comment   Diets: Diabetic Diets    Diets: Cardiac Diets    Cardiac Diet: Healthy Heart (2-3 Na+)    Diabetic Diet: Consistent Carbohydrate    Texture: Regular Texture (IDDSI 7)    Fluid Consistency: Thin (IDDSI 0)        04/27/23 1718              History, physical exam, assessment and plan may have been partly or fully copied from before, but  changes made to the copied record to reflect care on the date of service. Part of the lab and imaging  reports auto populated and corrected. Some of this note may be an electronic transcription of spoken  language to printed text. This may permit erroneous, or at times, nonsensical words or phrases to be  inadvertently transcribed. Although I have reviewed the note for such errors, some may still exist.      This document has been electronically signed by Charlie Persaud MD on May 13, 2023 11:10 CDT

## 2023-05-13 NOTE — PROGRESS NOTES
Pharmacokinetics by Pharmacy - Vancomycin    Emre Lisa is a 63 y.o. male receiving vancomycin 1250mg IV Q12H day 29 for bone and/or joint infection    Objective:  [Ht:  ; Wt: (!) 170 kg (375 lb 11.2 oz)]     WBC   Date Value Ref Range Status   05/13/2023 4.53 3.40 - 10.80 10*3/mm3 Final   05/12/2023 4.33 3.40 - 10.80 10*3/mm3 Final   05/11/2023 4.60 3.40 - 10.80 10*3/mm3 Final    No results found for: CRP, LACTATE   Temp Readings from Last 1 Encounters:   04/21/23 98.6 °F (37 °C)     Estimated Creatinine Clearance: 119 mL/min (by C-G formula based on SCr of 1.06 mg/dL).   Creatinine   Date Value Ref Range Status   05/13/2023 1.06 0.76 - 1.27 mg/dL Final   05/12/2023 1.07 0.76 - 1.27 mg/dL Final   05/11/2023 1.07 0.76 - 1.27 mg/dL Final       No results found for: VANCOPEAK, VANCOTROUGH, VANCORANDOM    Culture Results:  Microbiology Results (last 10 days)     ** No results found for the last 240 hours. **        No results found for: RESPCX      Assessment:    WBC 4.53, WNL   Scr 1.06, WNL and stable   No new cultures     Levels WNL on 5/10. Vancomycin trough and AUC goals 10-20 and 400-600, respectively.    Plan:  1. Continue vancomycin 1250mg IV Q12H. Consult ends 5/26/23.  2. Will order levels when clinically indicated.  3. Pharmacy will monitor renal function and adjust dose accordingly.    Eloy Mendoza, [facility]   05/13/23 09:16 CDT

## 2023-05-13 NOTE — ACP (ADVANCE CARE PLANNING)
[facility] @ [facility]  INPATIENT PROGRESS NOTE    PATIENT NAME: Emre Lisa      PHYSICIAN: Charlie Persaud MD  : 1960        MRN: [id number]  Patient Care Team:  Jamaal Bravo MD as PCP - Marco A Chauhan DPM as Consulting Physician (Podiatry)    Chief Complaint:  Continued antibiotic therapy and medical management  History of Present Illness: Patient is a 62 y/o male with a pmh of type 2 DM, hypertension, CAD, and neuropathy.  He recently underwent surgical repair of Charcot deformity with external fixator stabilization to his left foot related to cellulitis.  He tolerated the surgical procedure well.  He will need IV antibiotic therapy through 23 due to bone/joint infection.  He remains non-weight bearing of left lower extremity.  He tells me the pain to his left foot remains controlled, however, he does have a torn left rotator cuff and arthritis so his hip and shoulder are the source of his pain.  He tells me he is doing well and he has no other complaints or concerns.  I have reviewed the patients most recent labs and diagnostics independently.  Glucose 124, BUN 15, creatinine 0.89, sodium 138, potassium 4.2, chloride 105, co2 24, wbc 5.25, hgb 9.9, hct 30.1, platelets 156.       Assessment      Cellulitis of left foot  Charcot's joint of left foot  Type 2 DM  Arthritis  A-fib  Hypertension  Rheumatoid arthritis  DVT & GI prophylaxis     DVT Prophylaxis: Heparin  GI Prophylaxis: Protonix  Code Status: Full    Plan     -Stable at time of exam  -Significant clinical improvement noted.   -Continues to tolerate room air  -Continue fondaparinux.  -Continue therapy we will try to get patient out of bed to chair as tolerated.  -All specialists and consultants follow-up appreciated.  -Patient remains clinically stable and improved.  -Plans for surgical procedure via Dr. Thompson Monday.  -DVT and GI prophylaxis in place.  -We'll  continue monitoring patient in hospital setting and treat patient as course dictates.  -Please review orders for detailed plan of care.  -For Acute and Chronic medical condition we will continue medications described below in medication section which have been reviewed and we will continue unless changed in plan of care.  -Laboratory and diagnostic studies have been independently reviewed and the reports are reviewed as documented below.      Subjective   Interval History:   Patient Complaints:   Patient seen and examined.  Resting comfortably in bed.  No overnight events reported.   History taken from: Patient, patient's chart.    Review of Systems:    Review of Systems   Constitutional: Positive for activity change. Negative for chills and fever.   HENT: Negative for postnasal drip and tinnitus.    Respiratory: Negative for apnea.    Cardiovascular: Negative for chest pain and palpitations.   Gastrointestinal: Negative for abdominal distention, nausea and vomiting.   Musculoskeletal: Positive for arthralgias and myalgias.   Skin: Positive for wound.   Neurological: Negative for tremors and seizures.   Psychiatric/Behavioral: Negative for agitation, confusion and hallucinations.       Objective   Vital Signs:  Temp: 97.4 F         Heart Rate: 73         Resp: 20          Blood Pressure: 119/56         Pulse Ox: 96%    Physical Exam:   Physical Exam  Vitals reviewed.   Constitutional:       Appearance: Normal appearance. He is not ill-appearing.   HENT:      Head: Normocephalic and atraumatic.      Mouth/Throat:      Mouth: Mucous membranes are moist.      Pharynx: Oropharynx is clear.   Eyes:      Extraocular Movements: Extraocular movements intact.      Pupils: Pupils are equal, round, and reactive to light.   Neck:      Vascular: No carotid bruit.   Cardiovascular:      Rate and Rhythm: Regular rhythm. Tachycardia present.      Pulses: Normal pulses.      Heart sounds: Normal heart sounds. No murmur heard.    No  gallop.   Pulmonary:      Effort: Pulmonary effort is normal. No respiratory distress.      Breath sounds: Normal breath sounds. No rales.   Abdominal:      General: Bowel sounds are normal. There is no distension.      Palpations: Abdomen is soft.      Tenderness: There is no guarding.   Musculoskeletal:         General: Normal range of motion.      Cervical back: Normal range of motion and neck supple.      Right lower leg: Edema present.      Left lower leg: Edema present.   Skin:     General: Skin is warm and dry.      Capillary Refill: Capillary refill takes less than 2 seconds.      Findings: Bruising present.      Comments: S/P L surgical repair Charcot foot with external fixator.  Wound vac in place   Neurological:      General: No focal deficit present.      Mental Status: He is alert and oriented to person, place, and time.   Psychiatric:         Mood and Affect: Mood normal.         Behavior: Behavior normal.         Thought Content: Thought content normal.         Judgment: Judgment normal.       Results Review:       Results from last 7 days   Lab Units 05/12/23  0530 05/11/23  0611 05/10/23  0320 05/09/23  0606 05/08/23  0529 05/07/23  0702 05/06/23  0621   SODIUM mmol/L 137 136 137 138 136 140 136   POTASSIUM mmol/L 4.2 4.2 4.4 4.6 4.2 4.8 4.4   CHLORIDE mmol/L 103 101 102 102 101 104 103   CO2 mmol/L 24.0 25.0 24.0 25.0 24.0 25.0 21.0*   BUN mg/dL 28* 23 23 23 22 23 20   CREATININE mg/dL 1.07 1.07 1.06 1.11 1.07 1.24 1.00   GLUCOSE mg/dL 218* 150* 193* 160* 166* 156* 165*   CALCIUM mg/dL 8.8 8.9 9.2 8.8 8.5* 8.7 8.6   BILIRUBIN mg/dL 0.7 0.8 0.7 0.9 0.8 0.6 0.6   ALK PHOS U/L 106 114 118* 116 114 117 123*   ALT (SGPT) U/L 41 46* 47* 48* 50* 52* 63*   AST (SGOT) U/L 29 28 30 30 30 30 20     Results from last 7 days   Lab Units 05/12/23 0530 05/11/23  0611 05/10/23  0320 05/09/23  0606 05/08/23  0529 05/07/23  0702 05/06/23  0621   MAGNESIUM mg/dL 2.1 2.0 2.1 2.1 2.0 2.0 2.1     Results from last 7  days   Lab Units 05/12/23  0530 05/11/23  0611 05/10/23  0320 05/09/23  0606 05/08/23  0529 05/07/23  0702 05/06/23  0621   WBC 10*3/mm3 4.33 4.60 4.33 5.76 4.55 4.54 4.49   HEMOGLOBIN g/dL 11.3* 11.5* 11.4* 11.0* 10.2* 10.2* 9.9*   HEMATOCRIT % 36.4* 36.4* 36.6* 34.9* 32.2* 32.9* 30.9*   PLATELETS 10*3/mm3 218 204 212 191 209 193 169     Lab Results   Component Value Date    CKTOTAL 119 04/23/2023    CKMB 2.16 04/23/2023    TROPONINI <0.012 11/19/2014    TROPONINT 52 (H) 04/25/2023     CO2   Date Value Ref Range Status   05/12/2023 24.0 22.0 - 29.0 mmol/L Final         Imaging Results (Most Recent)     Procedure Component Value Units Date/Time    XR Ankle 3+ View Left [[id number]] Collected: 05/10/23 1948     Updated: 05/10/23 1955    Narrative:      TECHNIQUE:  AP, oblique and lateral views of the left ankle were obtained.    HISTORY:  Follow-up    COMPARISON:  None.    FINDINGS:  Extreme artifact related to external fixator device.  Surgical skin staples  remain in place.    There has been resection of the distal fibula.  Destructive change about the  ankle joint with some callus formation.  I do not have prior studies for  comparison.  Surgical skin staples remain in place.        US Venous Doppler Lower Extremity Bilateral (duplex) [[id number]] Collected: 04/28/23 2142     Updated: 04/28/23 2154    Narrative:      EXAMINATION:  Ultrasound bilateral lower extremity venous Doppler.    COMPARISON:  No comparison.    HISTORY:  Positive PE, concern for DVT.    TECHNIQUE:  High-resolution gray scale imaging, color flow Doppler, compression were  performed from the groin to the calf of the bilateral lower extremities.  Augmentation was performed of the bilateral common femoral vein and popliteal  vein.    FINDINGS:  There is DVT within both popliteal veins.  The remaining deep veins appear  patent with normal compressibility.    There is a complex collection in the left popliteal fossa containing  shadowing  calcification or two calcifications.      Impression:      1.  Bilateral DVT within the popliteal veins.    2.  Complex cystic area in the left popliteal fossa measuring up to 6.2 cm.  This may represent a complex Baker's cyst with loose bodies or calcifications.    US Renal Bilateral [[id number]] Collected: 04/26/23 0820     Updated: 04/26/23 1208    Narrative:      HISTORY:  Urinary retention.    COMPARISON:  None    TECHNIQUE:  Real-time ultrasound imaging and color Doppler used to evaluate the kidneys and  bladder.    FINDINGS:  The right kidney measures 13.1 x 5.9 x 4.5 cm. The left kidney measures 13 x 5.8  x 6.5 cm.  Cortical thickness and echotexture are within normal limits. There is  no hydronephrosis or evidence of mass. No calculus was seen.  Lower pole cyst on  the right measuring 4.9 x 4.1 x 4.6 cm.  This appears to be an anechoic  partially exophytic cyst.    The bladder was collapsed around a Colbert catheter.      Impression:      A 5 cm lower pole right renal cyst appears simple.  No acute sonographic  abnormality.      CT Angiogram Chest [[id number]] Collected: 04/26/23 0831     Updated: 04/26/23 1137    Narrative:      EXAM:  CTA chest with contrast, PE protocol.    COMPARISON:  CTA chest PE protocol, 04/24/2023.    HISTORY:  Pulmonary embolism suspected, unknown D-dimer.  Shortness of breath.    TECHNIQUE:  Intravenous contrast was administered and axial images from the thoracic inlet  through the diaphragm were performed followed by 2D multiplanar reformats.  MIPS  were reconstructed and reviewed.    FINDINGS:  Limited by respiratory motion artifact.    Mild bilateral dependent atelectasis.  Lungs are otherwise clear.  No  consolidation.    Trace bilateral pleural effusions.  Esophagus is collapsed and poorly evaluated.  Mild bilateral gynecomastia.  There is some mild lymphadenopathy suspected in  the upper mediastinum, nonspecific.  No lymphadenopathy identified elsewhere in  the chest  by size criteria.    Extensive bilateral pulmonary emboli with saddle embolus extending across the  bifurcation of the main pulmonary artery.  There is extensive embolic disease  throughout all lobes of both lungs. The Main  pulmonary artery is enlarged  measuring up to 4.6 cm in diameter.  There is abnormal flattening or even  leftward convexity of the intraventricular septum.    Limited evaluation of the upper abdomen due to motion.  No gross acute osseous  abnormality.      Impression:      1.  Extensive bilateral pulmonary emboli with saddle embolus extending across  the main pulmonary artery bifurcation.  There is enlargement of the main  pulmonary artery as well as leftward bowing or convexity of the intraventricular  septum.  These findings may be seen in the setting of right heart strain.    2.  The exam is otherwise limited by motion.  There is mild dependent  atelectasis.  Lungs are otherwise grossly clear.    3.  Mild lymphadenopathy in the upper mediastinum, nonspecific.  Consider  reevaluation on short-term follow-up CT chest.        XR Chest 1 View [[id number]] Collected: 04/25/23 0744     Updated: 04/25/23 0902    Narrative:      HISTORY:  Post code.    COMPARISON:  4/23/2023    FINDINGS:  AP view of the chest demonstrates clear lungs and mild cardiomegaly.  There is  no pneumothorax.  Left-sided AICD is unchanged.      Impression:      Mild cardiomegaly.      CT Angiogram Chest [[id number]] Collected: 04/24/23 0511     Updated: 04/24/23 0658    Narrative:      INDICATION:  Positive D-dimer    COMPARISON:  None    TECHNIQUE:  Volumetric CT scan of the chest, from the thoracic inlet to the level of the  diaphragms, with intravenous contrast.  2D axial, sagittal, and coronal  reformats were performed.  MIPS were performed on a separate workstation and  reviewed.    FINDINGS:  Lungs: No focal consolidation or mass.  Pleura: No effusion or pneumothorax.  Vessels: Thoracic aorta is normal in caliber.  Bones:  No suspicious lesions.  Visualized upper abdomen: Unremarkable.      Impression:      I suspect there are bilateral pulmonary emboli, but it is difficult to tell with  certainty due to suboptimal contrast timing and significant motion artifact.  Repeat examination is recommended.    XR Chest 1 View [[id number]] Collected: 04/23/23 1630     Updated: 04/23/23 1648    Narrative:      HISTORY:  Shortness of breath.    FINDINGS:  Frontal film of the chest was obtained.  There is a dual-lead pacing system in  place via the left subclavian venous approach.  The pacing leads appear to be in  good position.  There is mild pulmonary vascular congestion with mild  cardiomegaly.  No infiltrate or effusion is seen.      Impression:      1.  Mild cardiomegaly with mild pulmonary vascular congestion.    2.  No acute infiltrate or effusion is seen.         No results found for: ACANTHNAEG, AFBCX, BPERTUSSISCX, BLOODCX  No results found for: BCIDPCR, CXREFLEX, CSFCX, CULTURETIS  No results found for: CULTURES, HSVCX, URCX  No results found for: EYECULTURE, GCCX, HSVCULTURE, LABHSV  No results found for: LEGIONELLA, MRSACX, MUMPSCX, MYCOPLASCX  No results found for: NOCARDIACX, STOOLCX  No results found for: THROATCX, UNSTIMCULT, URINECX, CULTURE, VZVCULTUR  No results found for: VIRALCULTU, WOUNDCX  Medication Reviewed and Will Continue Unless Documented in Plan:   ascorbic acid, 1,000 mg, Oral, Nightly  cetirizine, 10 mg, Oral, Daily  vitamin D3, 5,000 Units, Oral, Nightly  collagenase, 1 application, Topical, Q24H  docusate sodium, 200 mg, Oral, BID  dronedarone, 400 mg, Oral, Daily With Dinner  fluticasone, 2 spray, Each Nare, Daily  fondaparinux, 10 mg, Subcutaneous, Q24H  glipizide, 10 mg, Oral, QAM AC  Insulin Aspart, 2-12 Units, Subcutaneous, 4x Daily With Meals & Nightly  magnesium oxide, 400 mg, Oral, BID  metoprolol succinate XL, 25 mg, Oral, BID  senna-docusate sodium, 2 tablet, Oral, BID  sodium bicarbonate, 650 mg, Oral,  TID  sodium chloride, 3 mL, Intravenous, Q12H  sodium chloride, 100 mL/hr, Intravenous, Once  tamsulosin, 0.4 mg, Oral, BID  vancomycin, 1,250 mg, Intravenous, Q12H      Pharmacy to dose vancomycin,       •  acetaminophen  •  bisacodyl  •  calcium carbonate  •  hydrALAZINE  •  hydrOXYzine pamoate  •  muscle rub  •  ondansetron  •  Pharmacy to dose vancomycin  •  simethicone  •  sodium chloride  •  traZODone  Pharmacy to dose vancomycin,        Dietary Orders (From admission, onward)     Start     Ordered    04/27/23 1718  Diet: Diabetic Diets, Cardiac Diets; Healthy Heart (2-3 Na+); Consistent Carbohydrate; Texture: Regular Texture (IDDSI 7); Fluid Consistency: Thin (IDDSI 0)  Diet Effective Now        References:    Diet Order [place]   Question Answer Comment   Diets: Diabetic Diets    Diets: Cardiac Diets    Cardiac Diet: Healthy Heart (2-3 Na+)    Diabetic Diet: Consistent Carbohydrate    Texture: Regular Texture (IDDSI 7)    Fluid Consistency: Thin (IDDSI 0)        04/27/23 1718              History, physical exam, assessment and plan may have been partly or fully copied from before, but  changes made to the copied record to reflect care on the date of service. Part of the lab and imaging  reports auto populated and corrected. Some of this note may be an electronic transcription of spoken  language to printed text. This may permit erroneous, or at times, nonsensical words or phrases to be  inadvertently transcribed. Although I have reviewed the note for such errors, some may still exist.      This document has been electronically signed by Charlie Persaud MD on May 12, 2023 21:19 CDT

## 2023-05-14 ENCOUNTER — OUTSIDE FACILITY SERVICE (OUTPATIENT)
Dept: PULMONOLOGY | Facility: CLINIC | Age: 63
End: 2023-05-14
Payer: MEDICARE

## 2023-05-14 LAB
ALBUMIN SERPL-MCNC: 3.7 G/DL (ref 3.5–5.2)
ALBUMIN/GLOB SERPL: 1.2 G/DL
ALP SERPL-CCNC: 104 U/L (ref 39–117)
ALT SERPL W P-5'-P-CCNC: 38 U/L (ref 1–41)
ANION GAP SERPL CALCULATED.3IONS-SCNC: 12 MMOL/L (ref 5–15)
AST SERPL-CCNC: 24 U/L (ref 1–40)
BASOPHILS # BLD AUTO: 0.04 10*3/MM3 (ref 0–0.2)
BASOPHILS NFR BLD AUTO: 0.8 % (ref 0–1.5)
BILIRUB SERPL-MCNC: 0.7 MG/DL (ref 0–1.2)
BUN SERPL-MCNC: 24 MG/DL (ref 8–23)
BUN/CREAT SERPL: 22.4 (ref 7–25)
CALCIUM SPEC-SCNC: 9.1 MG/DL (ref 8.6–10.5)
CHLORIDE SERPL-SCNC: 101 MMOL/L (ref 98–107)
CO2 SERPL-SCNC: 24 MMOL/L (ref 22–29)
CREAT SERPL-MCNC: 1.07 MG/DL (ref 0.76–1.27)
DEPRECATED RDW RBC AUTO: 48.5 FL (ref 37–54)
EGFRCR SERPLBLD CKD-EPI 2021: 79 ML/MIN/1.73
EOSINOPHIL # BLD AUTO: 0.15 10*3/MM3 (ref 0–0.4)
EOSINOPHIL NFR BLD AUTO: 3 % (ref 0.3–6.2)
ERYTHROCYTE [DISTWIDTH] IN BLOOD BY AUTOMATED COUNT: 14.6 % (ref 12.3–15.4)
GLOBULIN UR ELPH-MCNC: 3.2 GM/DL
GLUCOSE BLDC GLUCOMTR-MCNC: 176 MG/DL (ref 70–130)
GLUCOSE BLDC GLUCOMTR-MCNC: 176 MG/DL (ref 70–130)
GLUCOSE BLDC GLUCOMTR-MCNC: 200 MG/DL (ref 70–130)
GLUCOSE BLDC GLUCOMTR-MCNC: 203 MG/DL (ref 70–130)
GLUCOSE SERPL-MCNC: 167 MG/DL (ref 65–99)
HCT VFR BLD AUTO: 36.9 % (ref 37.5–51)
HGB BLD-MCNC: 11.4 G/DL (ref 13–17.7)
IMM GRANULOCYTES # BLD AUTO: 0.02 10*3/MM3 (ref 0–0.05)
IMM GRANULOCYTES NFR BLD AUTO: 0.4 % (ref 0–0.5)
LYMPHOCYTES # BLD AUTO: 1.52 10*3/MM3 (ref 0.7–3.1)
LYMPHOCYTES NFR BLD AUTO: 30.7 % (ref 19.6–45.3)
MAGNESIUM SERPL-MCNC: 1.9 MG/DL (ref 1.6–2.4)
MCH RBC QN AUTO: 27.9 PG (ref 26.6–33)
MCHC RBC AUTO-ENTMCNC: 30.9 G/DL (ref 31.5–35.7)
MCV RBC AUTO: 90.2 FL (ref 79–97)
MONOCYTES # BLD AUTO: 0.63 10*3/MM3 (ref 0.1–0.9)
MONOCYTES NFR BLD AUTO: 12.7 % (ref 5–12)
NEUTROPHILS NFR BLD AUTO: 2.59 10*3/MM3 (ref 1.7–7)
NEUTROPHILS NFR BLD AUTO: 52.4 % (ref 42.7–76)
NRBC BLD AUTO-RTO: 0 /100 WBC (ref 0–0.2)
PLATELET # BLD AUTO: 217 10*3/MM3 (ref 140–450)
PMV BLD AUTO: 9.9 FL (ref 6–12)
POTASSIUM SERPL-SCNC: 4.1 MMOL/L (ref 3.5–5.2)
PROT SERPL-MCNC: 6.9 G/DL (ref 6–8.5)
RBC # BLD AUTO: 4.09 10*6/MM3 (ref 4.14–5.8)
SODIUM SERPL-SCNC: 137 MMOL/L (ref 136–145)
WBC NRBC COR # BLD: 4.95 10*3/MM3 (ref 3.4–10.8)

## 2023-05-14 PROCEDURE — 83735 ASSAY OF MAGNESIUM: CPT | Performed by: INTERNAL MEDICINE

## 2023-05-14 PROCEDURE — 82948 REAGENT STRIP/BLOOD GLUCOSE: CPT

## 2023-05-14 PROCEDURE — 25010000002 VANCOMYCIN 10 G RECONSTITUTED SOLUTION: Performed by: INTERNAL MEDICINE

## 2023-05-14 PROCEDURE — 85025 COMPLETE CBC W/AUTO DIFF WBC: CPT | Performed by: INTERNAL MEDICINE

## 2023-05-14 PROCEDURE — 80053 COMPREHEN METABOLIC PANEL: CPT | Performed by: INTERNAL MEDICINE

## 2023-05-14 RX ORDER — SODIUM CHLORIDE 9 MG/ML
INJECTION, SOLUTION INTRAVENOUS
Status: DISPENSED
Start: 2023-05-14 | End: 2023-05-15

## 2023-05-14 NOTE — PROGRESS NOTES
Pharmacokinetics by Pharmacy - Vancomycin    Emre Lisa is a 63 y.o. male receiving vancomycin 1250mg IV Q12H day 30 for bone and/or joint    Objective:  [Ht: 182.9 cm; Wt: (!) 170 kg (375 lb 11.2 oz)]     WBC   Date Value Ref Range Status   05/14/2023 4.95 3.40 - 10.80 10*3/mm3 Final   05/13/2023 4.53 3.40 - 10.80 10*3/mm3 Final   05/12/2023 4.33 3.40 - 10.80 10*3/mm3 Final    No results found for: CRP, LACTATE   Temp Readings from Last 1 Encounters:   04/21/23 98.6 °F (37 °C)     Estimated Creatinine Clearance: 117.9 mL/min (by C-G formula based on SCr of 1.07 mg/dL).   Creatinine   Date Value Ref Range Status   05/14/2023 1.07 0.76 - 1.27 mg/dL Final   05/13/2023 1.06 0.76 - 1.27 mg/dL Final   05/12/2023 1.07 0.76 - 1.27 mg/dL Final       No results found for: VANCOPEAK, VANCOTROUGH, VANCORANDOM    Culture Results:  Microbiology Results (last 10 days)     ** No results found for the last 240 hours. **        No results found for: RESPCX    Assessment:    WBC 4.95, WNL   Scr 1.07, WNL   No new cultures     Levels WNL on 5/10. Vancomycin trough and AUC goals 10-20 and 400-600, respectively.    Plan:  1. Continue vancomycin 1250mg IV Q12H. Consult ends 5/26/23.   2. Vancomycin peak on 5/17 at 0630 and vancomycin trough on 5/17 at 1530.  3. Pharmacy will monitor renal function and adjust dose accordingly.      Eloy Mendoza [facility]   05/14/23 09:01 CDT

## 2023-05-14 NOTE — ACP (ADVANCE CARE PLANNING)
[facility] @ [facility]  INPATIENT PROGRESS NOTE    PATIENT NAME: Emre Lisa      PHYSICIAN: Charlie Persaud MD  : 1960        MRN: [id number]  Patient Care Team:  Jamaal Bravo MD as PCP - Marco A Chauhan DPM as Consulting Physician (Podiatry)    Chief Complaint:  Continued antibiotic therapy and medical management  History of Present Illness: Patient is a 64 y/o male with a pmh of type 2 DM, hypertension, CAD, and neuropathy.  He recently underwent surgical repair of Charcot deformity with external fixator stabilization to his left foot related to cellulitis.  He tolerated the surgical procedure well.  He will need IV antibiotic therapy through 23 due to bone/joint infection.  He remains non-weight bearing of left lower extremity.  He tells me the pain to his left foot remains controlled, however, he does have a torn left rotator cuff and arthritis so his hip and shoulder are the source of his pain.  He tells me he is doing well and he has no other complaints or concerns.  I have reviewed the patients most recent labs and diagnostics independently.  Glucose 124, BUN 15, creatinine 0.89, sodium 138, potassium 4.2, chloride 105, co2 24, wbc 5.25, hgb 9.9, hct 30.1, platelets 156.       Assessment      Cellulitis of left foot  Charcot's joint of left foot  Type 2 DM  Arthritis  A-fib  Hypertension  Rheumatoid arthritis  DVT & GI prophylaxis     DVT Prophylaxis: Heparin  GI Prophylaxis: Protonix  Code Status: Full    Plan     -Stable at time of exam  -Significant clinical improvement noted.   -Continues to tolerate room air  -Anticoagulation remains on hold.  -Continue therapy we will try to get patient out of bed to chair as tolerated.  -All specialists and consultants follow-up appreciated.  -Patient remains clinically stable and improved.  -Plans for surgical procedure via Dr. Thompson Monday.  -DVT and GI prophylaxis in  place.  -We'll continue monitoring patient in hospital setting and treat patient as course dictates.  -Please review orders for detailed plan of care.  -For Acute and Chronic medical condition we will continue medications described below in medication section which have been reviewed and we will continue unless changed in plan of care.  -Laboratory and diagnostic studies have been independently reviewed and the reports are reviewed as documented below.      Subjective   Interval History:   Patient Complaints:   Patient seen and examined.  Resting comfortably in bed.  No concerns.  No overnight events noted.  History taken from: Patient, patient's chart.    Review of Systems:    Review of Systems   Constitutional: Positive for activity change. Negative for chills and fever.   HENT: Negative for postnasal drip and tinnitus.    Respiratory: Negative for apnea.    Cardiovascular: Negative for chest pain and palpitations.   Gastrointestinal: Negative for abdominal distention, nausea and vomiting.   Musculoskeletal: Positive for arthralgias and myalgias.   Skin: Positive for wound.   Neurological: Negative for tremors and seizures.   Psychiatric/Behavioral: Negative for agitation, confusion and hallucinations.       Objective   Vital Signs:  Temp: 98.3 F         Heart Rate: 82         Resp: 22          Blood Pressure: 108/55         Pulse Ox: 96%    Physical Exam:   Physical Exam  Vitals reviewed.   Constitutional:       Appearance: Normal appearance. He is not ill-appearing.   HENT:      Head: Normocephalic and atraumatic.      Mouth/Throat:      Mouth: Mucous membranes are moist.      Pharynx: Oropharynx is clear.   Eyes:      Extraocular Movements: Extraocular movements intact.      Pupils: Pupils are equal, round, and reactive to light.   Neck:      Vascular: No carotid bruit.   Cardiovascular:      Rate and Rhythm: Regular rhythm. Tachycardia present.      Pulses: Normal pulses.      Heart sounds: Normal heart  sounds. No murmur heard.    No gallop.   Pulmonary:      Effort: Pulmonary effort is normal. No respiratory distress.      Breath sounds: Normal breath sounds. No rales.   Abdominal:      General: Bowel sounds are normal. There is no distension.      Palpations: Abdomen is soft.      Tenderness: There is no guarding.   Musculoskeletal:         General: Normal range of motion.      Cervical back: Normal range of motion and neck supple.      Right lower leg: Edema present.      Left lower leg: Edema present.   Skin:     General: Skin is warm and dry.      Capillary Refill: Capillary refill takes less than 2 seconds.      Findings: Bruising present.      Comments: S/P L surgical repair Charcot foot with external fixator.  Wound vac in place   Neurological:      General: No focal deficit present.      Mental Status: He is alert and oriented to person, place, and time.   Psychiatric:         Mood and Affect: Mood normal.         Behavior: Behavior normal.         Thought Content: Thought content normal.         Judgment: Judgment normal.       Results Review:       Results from last 7 days   Lab Units 05/14/23  0547 05/13/23  0534 05/12/23  0530 05/11/23  0611 05/10/23  0320 05/09/23  0606 05/08/23  0529   SODIUM mmol/L 137 139 137 136 137 138 136   POTASSIUM mmol/L 4.1 4.4 4.2 4.2 4.4 4.6 4.2   CHLORIDE mmol/L 101 103 103 101 102 102 101   CO2 mmol/L 24.0 26.0 24.0 25.0 24.0 25.0 24.0   BUN mg/dL 24* 26* 28* 23 23 23 22   CREATININE mg/dL 1.07 1.06 1.07 1.07 1.06 1.11 1.07   GLUCOSE mg/dL 167* 150* 218* 150* 193* 160* 166*   CALCIUM mg/dL 9.1 9.2 8.8 8.9 9.2 8.8 8.5*   BILIRUBIN mg/dL 0.7 0.8 0.7 0.8 0.7 0.9 0.8   ALK PHOS U/L 104 109 106 114 118* 116 114   ALT (SGPT) U/L 38 40 41 46* 47* 48* 50*   AST (SGOT) U/L 24 27 29 28 30 30 30     Results from last 7 days   Lab Units 05/14/23  0547 05/13/23  0534 05/12/23  0530 05/11/23  0611 05/10/23  0320 05/09/23  0606 05/08/23  0529   MAGNESIUM mg/dL 1.9 1.9 2.1 2.0 2.1 2.1  2.0     Results from last 7 days   Lab Units 05/14/23  0547 05/13/23  0533 05/12/23  0530 05/11/23  0611 05/10/23  0320 05/09/23  0606 05/08/23  0529   WBC 10*3/mm3 4.95 4.53 4.33 4.60 4.33 5.76 4.55   HEMOGLOBIN g/dL 11.4* 11.4* 11.3* 11.5* 11.4* 11.0* 10.2*   HEMATOCRIT % 36.9* 36.2* 36.4* 36.4* 36.6* 34.9* 32.2*   PLATELETS 10*3/mm3 217 203 218 204 212 191 209     Lab Results   Component Value Date    CKTOTAL 119 04/23/2023    CKMB 2.16 04/23/2023    TROPONINI <0.012 11/19/2014    TROPONINT 52 (H) 04/25/2023     CO2   Date Value Ref Range Status   05/14/2023 24.0 22.0 - 29.0 mmol/L Final         Imaging Results (Most Recent)     Procedure Component Value Units Date/Time    XR Ankle 3+ View Left [[id number]] Collected: 05/10/23 1948     Updated: 05/10/23 1955    Narrative:      TECHNIQUE:  AP, oblique and lateral views of the left ankle were obtained.    HISTORY:  Follow-up    COMPARISON:  None.    FINDINGS:  Extreme artifact related to external fixator device.  Surgical skin staples  remain in place.    There has been resection of the distal fibula.  Destructive change about the  ankle joint with some callus formation.  I do not have prior studies for  comparison.  Surgical skin staples remain in place.        US Venous Doppler Lower Extremity Bilateral (duplex) [[id number]] Collected: 04/28/23 2142     Updated: 04/28/23 2154    Narrative:      EXAMINATION:  Ultrasound bilateral lower extremity venous Doppler.    COMPARISON:  No comparison.    HISTORY:  Positive PE, concern for DVT.    TECHNIQUE:  High-resolution gray scale imaging, color flow Doppler, compression were  performed from the groin to the calf of the bilateral lower extremities.  Augmentation was performed of the bilateral common femoral vein and popliteal  vein.    FINDINGS:  There is DVT within both popliteal veins.  The remaining deep veins appear  patent with normal compressibility.    There is a complex collection in the left popliteal fossa  containing shadowing  calcification or two calcifications.      Impression:      1.  Bilateral DVT within the popliteal veins.    2.  Complex cystic area in the left popliteal fossa measuring up to 6.2 cm.  This may represent a complex Baker's cyst with loose bodies or calcifications.    US Renal Bilateral [[id number]] Collected: 04/26/23 0820     Updated: 04/26/23 1208    Narrative:      HISTORY:  Urinary retention.    COMPARISON:  None    TECHNIQUE:  Real-time ultrasound imaging and color Doppler used to evaluate the kidneys and  bladder.    FINDINGS:  The right kidney measures 13.1 x 5.9 x 4.5 cm. The left kidney measures 13 x 5.8  x 6.5 cm.  Cortical thickness and echotexture are within normal limits. There is  no hydronephrosis or evidence of mass. No calculus was seen.  Lower pole cyst on  the right measuring 4.9 x 4.1 x 4.6 cm.  This appears to be an anechoic  partially exophytic cyst.    The bladder was collapsed around a Colbert catheter.      Impression:      A 5 cm lower pole right renal cyst appears simple.  No acute sonographic  abnormality.      CT Angiogram Chest [[id number]] Collected: 04/26/23 0831     Updated: 04/26/23 1137    Narrative:      EXAM:  CTA chest with contrast, PE protocol.    COMPARISON:  CTA chest PE protocol, 04/24/2023.    HISTORY:  Pulmonary embolism suspected, unknown D-dimer.  Shortness of breath.    TECHNIQUE:  Intravenous contrast was administered and axial images from the thoracic inlet  through the diaphragm were performed followed by 2D multiplanar reformats.  MIPS  were reconstructed and reviewed.    FINDINGS:  Limited by respiratory motion artifact.    Mild bilateral dependent atelectasis.  Lungs are otherwise clear.  No  consolidation.    Trace bilateral pleural effusions.  Esophagus is collapsed and poorly evaluated.  Mild bilateral gynecomastia.  There is some mild lymphadenopathy suspected in  the upper mediastinum, nonspecific.  No lymphadenopathy identified elsewhere  in  the chest by size criteria.    Extensive bilateral pulmonary emboli with saddle embolus extending across the  bifurcation of the main pulmonary artery.  There is extensive embolic disease  throughout all lobes of both lungs. The Main  pulmonary artery is enlarged  measuring up to 4.6 cm in diameter.  There is abnormal flattening or even  leftward convexity of the intraventricular septum.    Limited evaluation of the upper abdomen due to motion.  No gross acute osseous  abnormality.      Impression:      1.  Extensive bilateral pulmonary emboli with saddle embolus extending across  the main pulmonary artery bifurcation.  There is enlargement of the main  pulmonary artery as well as leftward bowing or convexity of the intraventricular  septum.  These findings may be seen in the setting of right heart strain.    2.  The exam is otherwise limited by motion.  There is mild dependent  atelectasis.  Lungs are otherwise grossly clear.    3.  Mild lymphadenopathy in the upper mediastinum, nonspecific.  Consider  reevaluation on short-term follow-up CT chest.        XR Chest 1 View [[id number]] Collected: 04/25/23 0744     Updated: 04/25/23 0902    Narrative:      HISTORY:  Post code.    COMPARISON:  4/23/2023    FINDINGS:  AP view of the chest demonstrates clear lungs and mild cardiomegaly.  There is  no pneumothorax.  Left-sided AICD is unchanged.      Impression:      Mild cardiomegaly.      CT Angiogram Chest [[id number]] Collected: 04/24/23 0511     Updated: 04/24/23 0658    Narrative:      INDICATION:  Positive D-dimer    COMPARISON:  None    TECHNIQUE:  Volumetric CT scan of the chest, from the thoracic inlet to the level of the  diaphragms, with intravenous contrast.  2D axial, sagittal, and coronal  reformats were performed.  MIPS were performed on a separate workstation and  reviewed.    FINDINGS:  Lungs: No focal consolidation or mass.  Pleura: No effusion or pneumothorax.  Vessels: Thoracic aorta is normal in  caliber.  Bones: No suspicious lesions.  Visualized upper abdomen: Unremarkable.      Impression:      I suspect there are bilateral pulmonary emboli, but it is difficult to tell with  certainty due to suboptimal contrast timing and significant motion artifact.  Repeat examination is recommended.    XR Chest 1 View [[id number]] Collected: 04/23/23 1630     Updated: 04/23/23 1648    Narrative:      HISTORY:  Shortness of breath.    FINDINGS:  Frontal film of the chest was obtained.  There is a dual-lead pacing system in  place via the left subclavian venous approach.  The pacing leads appear to be in  good position.  There is mild pulmonary vascular congestion with mild  cardiomegaly.  No infiltrate or effusion is seen.      Impression:      1.  Mild cardiomegaly with mild pulmonary vascular congestion.    2.  No acute infiltrate or effusion is seen.         No results found for: ACANTHNAEG, AFBCX, BPERTUSSISCX, BLOODCX  No results found for: BCIDPCR, CXREFLEX, CSFCX, CULTURETIS  No results found for: CULTURES, HSVCX, URCX  No results found for: EYECULTURE, GCCX, HSVCULTURE, LABHSV  No results found for: LEGIONELLA, MRSACX, MUMPSCX, MYCOPLASCX  No results found for: NOCARDIACX, STOOLCX  No results found for: THROATCX, UNSTIMCULT, URINECX, CULTURE, VZVCULTUR  No results found for: VIRALCULTU, WOUNDCX  Medication Reviewed and Will Continue Unless Documented in Plan:   [START ON 5/17/2023] !Vancomycin Level Draw Needed, , Does not apply, Once  ascorbic acid, 1,000 mg, Oral, Nightly  cetirizine, 10 mg, Oral, Daily  vitamin D3, 5,000 Units, Oral, Nightly  collagenase, 1 application, Topical, Q24H  docusate sodium, 200 mg, Oral, BID  dronedarone, 400 mg, Oral, Daily With Dinner  fluticasone, 2 spray, Each Nare, Daily  glipizide, 10 mg, Oral, QAM AC  Insulin Aspart, 2-12 Units, Subcutaneous, 4x Daily With Meals & Nightly  magnesium oxide, 400 mg, Oral, BID  metoprolol succinate XL, 25 mg, Oral, BID  senna-docusate sodium, 2  tablet, Oral, BID  sodium bicarbonate, 650 mg, Oral, TID  sodium chloride, 3 mL, Intravenous, Q12H  sodium chloride, 100 mL/hr, Intravenous, Once  tamsulosin, 0.4 mg, Oral, BID  vancomycin, 1,250 mg, Intravenous, Q12H      Pharmacy to dose vancomycin,       •  acetaminophen  •  bisacodyl  •  calcium carbonate  •  hydrALAZINE  •  hydrOXYzine pamoate  •  muscle rub  •  ondansetron  •  Pharmacy to dose vancomycin  •  simethicone  •  sodium chloride  •  traZODone  Pharmacy to dose vancomycin,        Dietary Orders (From admission, onward)     Start     Ordered    04/27/23 1718  Diet: Diabetic Diets, Cardiac Diets; Healthy Heart (2-3 Na+); Consistent Carbohydrate; Texture: Regular Texture (IDDSI 7); Fluid Consistency: Thin (IDDSI 0)  Diet Effective Now        References:    Diet Order [facility]   Question Answer Comment   Diets: Diabetic Diets    Diets: Cardiac Diets    Cardiac Diet: Healthy Heart (2-3 Na+)    Diabetic Diet: Consistent Carbohydrate    Texture: Regular Texture (IDDSI 7)    Fluid Consistency: Thin (IDDSI 0)        04/27/23 1718              History, physical exam, assessment and plan may have been partly or fully copied from before, but  changes made to the copied record to reflect care on the date of service. Part of the lab and imaging  reports auto populated and corrected. Some of this note may be an electronic transcription of spoken  language to printed text. This may permit erroneous, or at times, nonsensical words or phrases to be  inadvertently transcribed. Although I have reviewed the note for such errors, some may still exist.      This document has been electronically signed by Charlie Persaud MD on May 14, 2023 13:41 CDT

## 2023-05-15 ENCOUNTER — HOSPITAL ENCOUNTER (OUTPATIENT)
Facility: HOSPITAL | Age: 63
End: 2023-05-30
Attending: INTERNAL MEDICINE | Admitting: INTERNAL MEDICINE
Payer: COMMERCIAL

## 2023-05-15 ENCOUNTER — ANESTHESIA (OUTPATIENT)
Dept: PERIOP | Facility: HOSPITAL | Age: 63
End: 2023-05-15
Payer: COMMERCIAL

## 2023-05-15 ENCOUNTER — ANESTHESIA EVENT (OUTPATIENT)
Dept: PERIOP | Facility: HOSPITAL | Age: 63
End: 2023-05-15
Payer: COMMERCIAL

## 2023-05-15 ENCOUNTER — APPOINTMENT (OUTPATIENT)
Dept: GENERAL RADIOLOGY | Facility: HOSPITAL | Age: 63
End: 2023-05-15
Payer: COMMERCIAL

## 2023-05-15 ENCOUNTER — OUTSIDE FACILITY SERVICE (OUTPATIENT)
Dept: PULMONOLOGY | Facility: CLINIC | Age: 63
End: 2023-05-15
Payer: MEDICARE

## 2023-05-15 VITALS
RESPIRATION RATE: 12 BRPM | TEMPERATURE: 97.9 F | OXYGEN SATURATION: 96 % | DIASTOLIC BLOOD PRESSURE: 69 MMHG | HEART RATE: 82 BPM | WEIGHT: 315 LBS | SYSTOLIC BLOOD PRESSURE: 126 MMHG | BODY MASS INDEX: 50.95 KG/M2

## 2023-05-15 DIAGNOSIS — R26.89 DECREASED FUNCTIONAL MOBILITY: ICD-10-CM

## 2023-05-15 DIAGNOSIS — Z74.09 IMPAIRED MOBILITY AND ADLS: ICD-10-CM

## 2023-05-15 DIAGNOSIS — Z78.9 IMPAIRED MOBILITY AND ADLS: ICD-10-CM

## 2023-05-15 LAB
ALBUMIN SERPL-MCNC: 3.9 G/DL (ref 3.5–5.2)
ALBUMIN/GLOB SERPL: 1.2 G/DL
ALP SERPL-CCNC: 102 U/L (ref 39–117)
ALT SERPL W P-5'-P-CCNC: 35 U/L (ref 1–41)
ANION GAP SERPL CALCULATED.3IONS-SCNC: 12 MMOL/L (ref 5–15)
AST SERPL-CCNC: 27 U/L (ref 1–40)
BASOPHILS # BLD AUTO: 0.04 10*3/MM3 (ref 0–0.2)
BASOPHILS NFR BLD AUTO: 0.8 % (ref 0–1.5)
BILIRUB SERPL-MCNC: 0.8 MG/DL (ref 0–1.2)
BUN SERPL-MCNC: 24 MG/DL (ref 8–23)
BUN/CREAT SERPL: 24.7 (ref 7–25)
CALCIUM SPEC-SCNC: 9.1 MG/DL (ref 8.6–10.5)
CHLORIDE SERPL-SCNC: 102 MMOL/L (ref 98–107)
CO2 SERPL-SCNC: 24 MMOL/L (ref 22–29)
CREAT SERPL-MCNC: 0.97 MG/DL (ref 0.76–1.27)
DEPRECATED RDW RBC AUTO: 46.9 FL (ref 37–54)
EGFRCR SERPLBLD CKD-EPI 2021: 88.8 ML/MIN/1.73
EOSINOPHIL # BLD AUTO: 0.19 10*3/MM3 (ref 0–0.4)
EOSINOPHIL NFR BLD AUTO: 3.9 % (ref 0.3–6.2)
ERYTHROCYTE [DISTWIDTH] IN BLOOD BY AUTOMATED COUNT: 14.5 % (ref 12.3–15.4)
GLOBULIN UR ELPH-MCNC: 3.2 GM/DL
GLUCOSE BLDC GLUCOMTR-MCNC: 105 MG/DL (ref 70–130)
GLUCOSE BLDC GLUCOMTR-MCNC: 145 MG/DL (ref 70–130)
GLUCOSE BLDC GLUCOMTR-MCNC: 192 MG/DL (ref 70–130)
GLUCOSE BLDC GLUCOMTR-MCNC: 99 MG/DL (ref 70–130)
GLUCOSE SERPL-MCNC: 124 MG/DL (ref 65–99)
HCT VFR BLD AUTO: 37.3 % (ref 37.5–51)
HGB BLD-MCNC: 11.8 G/DL (ref 13–17.7)
IMM GRANULOCYTES # BLD AUTO: 0.02 10*3/MM3 (ref 0–0.05)
IMM GRANULOCYTES NFR BLD AUTO: 0.4 % (ref 0–0.5)
LYMPHOCYTES # BLD AUTO: 1.52 10*3/MM3 (ref 0.7–3.1)
LYMPHOCYTES NFR BLD AUTO: 31 % (ref 19.6–45.3)
MAGNESIUM SERPL-MCNC: 1.9 MG/DL (ref 1.6–2.4)
MCH RBC QN AUTO: 28.3 PG (ref 26.6–33)
MCHC RBC AUTO-ENTMCNC: 31.6 G/DL (ref 31.5–35.7)
MCV RBC AUTO: 89.4 FL (ref 79–97)
MONOCYTES # BLD AUTO: 0.59 10*3/MM3 (ref 0.1–0.9)
MONOCYTES NFR BLD AUTO: 12 % (ref 5–12)
NEUTROPHILS NFR BLD AUTO: 2.54 10*3/MM3 (ref 1.7–7)
NEUTROPHILS NFR BLD AUTO: 51.9 % (ref 42.7–76)
NRBC BLD AUTO-RTO: 0 /100 WBC (ref 0–0.2)
PLATELET # BLD AUTO: 212 10*3/MM3 (ref 140–450)
PMV BLD AUTO: 10 FL (ref 6–12)
POTASSIUM SERPL-SCNC: 4.2 MMOL/L (ref 3.5–5.2)
PROT SERPL-MCNC: 7.1 G/DL (ref 6–8.5)
RBC # BLD AUTO: 4.17 10*6/MM3 (ref 4.14–5.8)
SODIUM SERPL-SCNC: 138 MMOL/L (ref 136–145)
WBC NRBC COR # BLD: 4.9 10*3/MM3 (ref 3.4–10.8)

## 2023-05-15 PROCEDURE — 25010000002 PROPOFOL 200 MG/20ML EMULSION

## 2023-05-15 PROCEDURE — 25010000002 MIDAZOLAM PER 1 MG: Performed by: NURSE ANESTHETIST, CERTIFIED REGISTERED

## 2023-05-15 PROCEDURE — 82948 REAGENT STRIP/BLOOD GLUCOSE: CPT

## 2023-05-15 PROCEDURE — 25010000002 ONDANSETRON PER 1 MG

## 2023-05-15 PROCEDURE — C1713 ANCHOR/SCREW BN/BN,TIS/BN: HCPCS | Performed by: PODIATRIST

## 2023-05-15 PROCEDURE — 76000 FLUOROSCOPY <1 HR PHYS/QHP: CPT

## 2023-05-15 PROCEDURE — 80053 COMPREHEN METABOLIC PANEL: CPT | Performed by: INTERNAL MEDICINE

## 2023-05-15 PROCEDURE — 25010000002 ROPIVACAINE PER 1 MG

## 2023-05-15 PROCEDURE — 25010000002 MIDAZOLAM PER 1 MG

## 2023-05-15 PROCEDURE — 83735 ASSAY OF MAGNESIUM: CPT | Performed by: INTERNAL MEDICINE

## 2023-05-15 PROCEDURE — 25010000002 ONDANSETRON PER 1 MG: Performed by: NURSE ANESTHETIST, CERTIFIED REGISTERED

## 2023-05-15 PROCEDURE — 25010000002 FENTANYL CITRATE (PF) 100 MCG/2ML SOLUTION

## 2023-05-15 PROCEDURE — 25010000002 DEXAMETHASONE PER 1 MG

## 2023-05-15 PROCEDURE — 25010000002 CEFAZOLIN PER 500 MG: Performed by: PODIATRIST

## 2023-05-15 PROCEDURE — 85025 COMPLETE CBC W/AUTO DIFF WBC: CPT | Performed by: INTERNAL MEDICINE

## 2023-05-15 PROCEDURE — 25010000002 VANCOMYCIN 10 G RECONSTITUTED SOLUTION: Performed by: INTERNAL MEDICINE

## 2023-05-15 PROCEDURE — 25010000002 CEFAZOLIN PER 500 MG

## 2023-05-15 PROCEDURE — 25010000002 PHENYLEPHRINE 10 MG/ML SOLUTION

## 2023-05-15 PROCEDURE — C1769 GUIDE WIRE: HCPCS | Performed by: PODIATRIST

## 2023-05-15 PROCEDURE — 25010000002 CEFAZOLIN PER 500 MG: Performed by: NURSE ANESTHETIST, CERTIFIED REGISTERED

## 2023-05-15 PROCEDURE — 25010000002 PROPOFOL 200 MG/20ML EMULSION: Performed by: NURSE ANESTHETIST, CERTIFIED REGISTERED

## 2023-05-15 PROCEDURE — 25010000002 FENTANYL CITRATE (PF) 100 MCG/2ML SOLUTION: Performed by: NURSE ANESTHETIST, CERTIFIED REGISTERED

## 2023-05-15 PROCEDURE — 25010000002 PHENYLEPHRINE 10 MG/ML SOLUTION: Performed by: NURSE ANESTHETIST, CERTIFIED REGISTERED

## 2023-05-15 PROCEDURE — 20694 RMVL EXT FIXJ SYS UNDER ANES: CPT | Performed by: PODIATRIST

## 2023-05-15 PROCEDURE — 28705 ARTHRODESIS PANTALAR: CPT | Performed by: PODIATRIST

## 2023-05-15 DEVICE — IMPLANTABLE DEVICE
Type: IMPLANTABLE DEVICE | Site: FOOT | Status: FUNCTIONAL
Brand: VALOR

## 2023-05-15 DEVICE — IMPLANTABLE DEVICE
Type: IMPLANTABLE DEVICE | Site: FOOT | Status: FUNCTIONAL
Brand: VALOR®

## 2023-05-15 RX ORDER — SODIUM BICARBONATE 650 MG/1
650 TABLET ORAL 3 TIMES DAILY
Status: DISCONTINUED | OUTPATIENT
Start: 2023-05-15 | End: 2023-05-30 | Stop reason: HOSPADM

## 2023-05-15 RX ORDER — ONDANSETRON 2 MG/ML
4 INJECTION INTRAMUSCULAR; INTRAVENOUS ONCE AS NEEDED
Status: DISCONTINUED | OUTPATIENT
Start: 2023-05-15 | End: 2023-05-15 | Stop reason: HOSPADM

## 2023-05-15 RX ORDER — CETIRIZINE HYDROCHLORIDE 10 MG/1
10 TABLET ORAL DAILY
Status: DISCONTINUED | OUTPATIENT
Start: 2023-05-16 | End: 2023-05-30 | Stop reason: HOSPADM

## 2023-05-15 RX ORDER — BISACODYL 10 MG
5 SUPPOSITORY, RECTAL RECTAL DAILY PRN
Status: DISCONTINUED | OUTPATIENT
Start: 2023-05-15 | End: 2023-05-30 | Stop reason: HOSPADM

## 2023-05-15 RX ORDER — SODIUM CHLORIDE 9 MG/ML
INJECTION, SOLUTION INTRAVENOUS CONTINUOUS PRN
Status: DISCONTINUED | OUTPATIENT
Start: 2023-05-15 | End: 2023-05-15 | Stop reason: SURG

## 2023-05-15 RX ORDER — METOPROLOL SUCCINATE 25 MG/1
25 TABLET, EXTENDED RELEASE ORAL 2 TIMES DAILY
Status: DISCONTINUED | OUTPATIENT
Start: 2023-05-15 | End: 2023-05-30 | Stop reason: HOSPADM

## 2023-05-15 RX ORDER — BUPIVACAINE HCL/0.9 % NACL/PF 0.1 %
2 PLASTIC BAG, INJECTION (ML) EPIDURAL ONCE
Status: COMPLETED | OUTPATIENT
Start: 2023-05-15 | End: 2023-05-15

## 2023-05-15 RX ORDER — HYDRALAZINE HYDROCHLORIDE 20 MG/ML
10 INJECTION INTRAMUSCULAR; INTRAVENOUS EVERY 4 HOURS PRN
Status: DISCONTINUED | OUTPATIENT
Start: 2023-05-15 | End: 2023-05-30 | Stop reason: HOSPADM

## 2023-05-15 RX ORDER — EPHEDRINE SULFATE 50 MG/ML
5 INJECTION, SOLUTION INTRAVENOUS ONCE AS NEEDED
Status: DISCONTINUED | OUTPATIENT
Start: 2023-05-15 | End: 2023-05-15 | Stop reason: HOSPADM

## 2023-05-15 RX ORDER — ACETAMINOPHEN 325 MG/1
650 TABLET ORAL EVERY 6 HOURS PRN
Status: DISCONTINUED | OUTPATIENT
Start: 2023-05-15 | End: 2023-05-30 | Stop reason: HOSPADM

## 2023-05-15 RX ORDER — MIDAZOLAM HYDROCHLORIDE 1 MG/ML
INJECTION INTRAMUSCULAR; INTRAVENOUS AS NEEDED
Status: DISCONTINUED | OUTPATIENT
Start: 2023-05-15 | End: 2023-05-15 | Stop reason: SURG

## 2023-05-15 RX ORDER — MELATONIN
5000 NIGHTLY
Status: DISCONTINUED | OUTPATIENT
Start: 2023-05-15 | End: 2023-05-30 | Stop reason: HOSPADM

## 2023-05-15 RX ORDER — GLIPIZIDE 10 MG/1
10 TABLET ORAL
Status: DISCONTINUED | OUTPATIENT
Start: 2023-05-16 | End: 2023-05-30 | Stop reason: HOSPADM

## 2023-05-15 RX ORDER — DEXTROSE MONOHYDRATE 25 G/50ML
50 INJECTION, SOLUTION INTRAVENOUS
Status: DISCONTINUED | OUTPATIENT
Start: 2023-05-15 | End: 2023-05-30 | Stop reason: HOSPADM

## 2023-05-15 RX ORDER — EPHEDRINE SULFATE 50 MG/ML
INJECTION INTRAVENOUS AS NEEDED
Status: DISCONTINUED | OUTPATIENT
Start: 2023-05-15 | End: 2023-05-15

## 2023-05-15 RX ORDER — TRAZODONE HYDROCHLORIDE 50 MG/1
50 TABLET ORAL NIGHTLY PRN
Status: DISCONTINUED | OUTPATIENT
Start: 2023-05-15 | End: 2023-05-30 | Stop reason: HOSPADM

## 2023-05-15 RX ORDER — FENTANYL CITRATE 50 UG/ML
INJECTION, SOLUTION INTRAMUSCULAR; INTRAVENOUS AS NEEDED
Status: DISCONTINUED | OUTPATIENT
Start: 2023-05-15 | End: 2023-05-15 | Stop reason: SURG

## 2023-05-15 RX ORDER — ACETAMINOPHEN 650 MG/1
650 SUPPOSITORY RECTAL ONCE AS NEEDED
Status: DISCONTINUED | OUTPATIENT
Start: 2023-05-15 | End: 2023-05-15 | Stop reason: HOSPADM

## 2023-05-15 RX ORDER — ONDANSETRON 2 MG/ML
INJECTION INTRAMUSCULAR; INTRAVENOUS AS NEEDED
Status: DISCONTINUED | OUTPATIENT
Start: 2023-05-15 | End: 2023-05-15 | Stop reason: SURG

## 2023-05-15 RX ORDER — MUSCLE RUB CREAM 100; 150 MG/G; MG/G
1 CREAM TOPICAL
Status: DISCONTINUED | OUTPATIENT
Start: 2023-05-15 | End: 2023-05-30 | Stop reason: HOSPADM

## 2023-05-15 RX ORDER — PROMETHAZINE HYDROCHLORIDE 25 MG/1
25 SUPPOSITORY RECTAL ONCE AS NEEDED
Status: DISCONTINUED | OUTPATIENT
Start: 2023-05-15 | End: 2023-05-15 | Stop reason: HOSPADM

## 2023-05-15 RX ORDER — FLUTICASONE PROPIONATE 50 MCG
2 SPRAY, SUSPENSION (ML) NASAL DAILY
Status: DISCONTINUED | OUTPATIENT
Start: 2023-05-16 | End: 2023-05-18

## 2023-05-15 RX ORDER — CEFAZOLIN SODIUM 1 G/3ML
INJECTION, POWDER, FOR SOLUTION INTRAMUSCULAR; INTRAVENOUS AS NEEDED
Status: DISCONTINUED | OUTPATIENT
Start: 2023-05-15 | End: 2023-05-15 | Stop reason: SURG

## 2023-05-15 RX ORDER — PROPOFOL 10 MG/ML
INJECTION, EMULSION INTRAVENOUS AS NEEDED
Status: DISCONTINUED | OUTPATIENT
Start: 2023-05-15 | End: 2023-05-15 | Stop reason: SURG

## 2023-05-15 RX ORDER — TAMSULOSIN HYDROCHLORIDE 0.4 MG/1
0.4 CAPSULE ORAL 2 TIMES DAILY
Status: DISCONTINUED | OUTPATIENT
Start: 2023-05-15 | End: 2023-05-30 | Stop reason: HOSPADM

## 2023-05-15 RX ORDER — FLUMAZENIL 0.1 MG/ML
0.2 INJECTION INTRAVENOUS AS NEEDED
Status: DISCONTINUED | OUTPATIENT
Start: 2023-05-15 | End: 2023-05-15 | Stop reason: HOSPADM

## 2023-05-15 RX ORDER — INSULIN ASPART 100 [IU]/ML
2-12 INJECTION, SOLUTION INTRAVENOUS; SUBCUTANEOUS
Status: DISCONTINUED | OUTPATIENT
Start: 2023-05-15 | End: 2023-05-30 | Stop reason: HOSPADM

## 2023-05-15 RX ORDER — ACETAMINOPHEN 325 MG/1
650 TABLET ORAL ONCE AS NEEDED
Status: DISCONTINUED | OUTPATIENT
Start: 2023-05-15 | End: 2023-05-15 | Stop reason: HOSPADM

## 2023-05-15 RX ORDER — SIMETHICONE 80 MG
80 TABLET,CHEWABLE ORAL EVERY 4 HOURS PRN
Status: DISCONTINUED | OUTPATIENT
Start: 2023-05-15 | End: 2023-05-30 | Stop reason: HOSPADM

## 2023-05-15 RX ORDER — ASCORBIC ACID 500 MG
1000 TABLET ORAL NIGHTLY
Status: DISCONTINUED | OUTPATIENT
Start: 2023-05-15 | End: 2023-05-30 | Stop reason: HOSPADM

## 2023-05-15 RX ORDER — CALCIUM CARBONATE 500 MG/1
2 TABLET, CHEWABLE ORAL 3 TIMES DAILY PRN
Status: DISCONTINUED | OUTPATIENT
Start: 2023-05-15 | End: 2023-05-30 | Stop reason: HOSPADM

## 2023-05-15 RX ORDER — SODIUM CHLORIDE 0.9 % (FLUSH) 0.9 %
3 SYRINGE (ML) INJECTION AS NEEDED
Status: DISCONTINUED | OUTPATIENT
Start: 2023-05-15 | End: 2023-05-30 | Stop reason: HOSPADM

## 2023-05-15 RX ORDER — PHENYLEPHRINE HYDROCHLORIDE 10 MG/ML
INJECTION INTRAVENOUS AS NEEDED
Status: DISCONTINUED | OUTPATIENT
Start: 2023-05-15 | End: 2023-05-15 | Stop reason: SURG

## 2023-05-15 RX ORDER — OXYCODONE AND ACETAMINOPHEN 7.5; 325 MG/1; MG/1
1 TABLET ORAL EVERY 4 HOURS PRN
Status: DISPENSED | OUTPATIENT
Start: 2023-05-15 | End: 2023-05-22

## 2023-05-15 RX ORDER — BACITRACIN ZINC 500 [USP'U]/G
OINTMENT TOPICAL AS NEEDED
Status: DISCONTINUED | OUTPATIENT
Start: 2023-05-15 | End: 2023-05-15 | Stop reason: HOSPADM

## 2023-05-15 RX ORDER — DOCUSATE SODIUM 100 MG/1
200 CAPSULE, LIQUID FILLED ORAL 2 TIMES DAILY
Status: DISCONTINUED | OUTPATIENT
Start: 2023-05-15 | End: 2023-05-30 | Stop reason: HOSPADM

## 2023-05-15 RX ORDER — AMOXICILLIN 250 MG
2 CAPSULE ORAL 2 TIMES DAILY
Status: DISCONTINUED | OUTPATIENT
Start: 2023-05-15 | End: 2023-05-30 | Stop reason: HOSPADM

## 2023-05-15 RX ORDER — LIDOCAINE HYDROCHLORIDE 10 MG/ML
INJECTION, SOLUTION EPIDURAL; INFILTRATION; INTRACAUDAL; PERINEURAL AS NEEDED
Status: DISCONTINUED | OUTPATIENT
Start: 2023-05-15 | End: 2023-05-15 | Stop reason: SURG

## 2023-05-15 RX ORDER — HYDROXYZINE PAMOATE 25 MG/1
25 CAPSULE ORAL 3 TIMES DAILY PRN
Status: DISCONTINUED | OUTPATIENT
Start: 2023-05-15 | End: 2023-05-30 | Stop reason: HOSPADM

## 2023-05-15 RX ORDER — NALOXONE HCL 0.4 MG/ML
0.4 VIAL (ML) INJECTION AS NEEDED
Status: DISCONTINUED | OUTPATIENT
Start: 2023-05-15 | End: 2023-05-15 | Stop reason: HOSPADM

## 2023-05-15 RX ORDER — ONDANSETRON 2 MG/ML
4 INJECTION INTRAMUSCULAR; INTRAVENOUS EVERY 4 HOURS PRN
Status: DISCONTINUED | OUTPATIENT
Start: 2023-05-15 | End: 2023-05-30 | Stop reason: HOSPADM

## 2023-05-15 RX ORDER — PROMETHAZINE HYDROCHLORIDE 25 MG/1
25 TABLET ORAL ONCE AS NEEDED
Status: DISCONTINUED | OUTPATIENT
Start: 2023-05-15 | End: 2023-05-15 | Stop reason: HOSPADM

## 2023-05-15 RX ORDER — EPHEDRINE SULFATE 50 MG/ML
INJECTION INTRAVENOUS AS NEEDED
Status: DISCONTINUED | OUTPATIENT
Start: 2023-05-15 | End: 2023-05-15 | Stop reason: SURG

## 2023-05-15 RX ADMIN — MIDAZOLAM HYDROCHLORIDE 1 MG: 1 INJECTION, SOLUTION INTRAMUSCULAR; INTRAVENOUS at 14:05

## 2023-05-15 RX ADMIN — PROPOFOL 120 MG: 10 INJECTION, EMULSION INTRAVENOUS at 14:08

## 2023-05-15 RX ADMIN — CEFAZOLIN 1 G: 330 INJECTION, POWDER, FOR SOLUTION INTRAMUSCULAR; INTRAVENOUS at 14:21

## 2023-05-15 RX ADMIN — LIDOCAINE HYDROCHLORIDE 50 MG: 10 INJECTION, SOLUTION EPIDURAL; INFILTRATION; INTRACAUDAL; PERINEURAL at 14:08

## 2023-05-15 RX ADMIN — PHENYLEPHRINE HYDROCHLORIDE 200 MCG: 10 INJECTION INTRAVENOUS at 14:57

## 2023-05-15 RX ADMIN — PHENYLEPHRINE HYDROCHLORIDE 100 MCG: 10 INJECTION INTRAVENOUS at 14:41

## 2023-05-15 RX ADMIN — SODIUM CHLORIDE: 9 INJECTION, SOLUTION INTRAVENOUS at 13:58

## 2023-05-15 RX ADMIN — Medication 2 G: at 14:08

## 2023-05-15 RX ADMIN — FENTANYL CITRATE 25 MCG: 50 INJECTION, SOLUTION INTRAMUSCULAR; INTRAVENOUS at 14:13

## 2023-05-15 RX ADMIN — EPHEDRINE SULFATE 10 MG: 50 INJECTION INTRAVENOUS at 14:33

## 2023-05-15 RX ADMIN — FENTANYL CITRATE 25 MCG: 50 INJECTION, SOLUTION INTRAMUSCULAR; INTRAVENOUS at 14:51

## 2023-05-15 RX ADMIN — EPHEDRINE SULFATE 5 MG: 50 INJECTION INTRAVENOUS at 14:42

## 2023-05-15 RX ADMIN — PHENYLEPHRINE HYDROCHLORIDE 100 MCG: 10 INJECTION INTRAVENOUS at 14:20

## 2023-05-15 RX ADMIN — PHENYLEPHRINE HYDROCHLORIDE 100 MCG: 10 INJECTION INTRAVENOUS at 14:30

## 2023-05-15 RX ADMIN — ONDANSETRON 4 MG: 2 INJECTION INTRAMUSCULAR; INTRAVENOUS at 15:04

## 2023-05-15 NOTE — PROGRESS NOTES
Pharmacokinetics by Pharmacy - Vancomycin    Emre Lisa is a 63 y.o. male  [Ht: 182.9 cm ; Wt: (!) 170 kg (375 lb 11.2 oz)] is being treated for bone/joint infection, day 31 of therapy.    Estimated Creatinine Clearance: 130.1 mL/min (by C-G formula based on SCr of 0.97 mg/dL).   Creatinine   Date Value Ref Range Status   05/15/2023 0.97 0.76 - 1.27 mg/dL Final   05/14/2023 1.07 0.76 - 1.27 mg/dL Final   05/13/2023 1.06 0.76 - 1.27 mg/dL Final   11/08/2022 1.0 0.7 - 1.3 mg/dL Final      Lab Results   Component Value Date    WBC 4.90 05/15/2023    WBC 4.95 05/14/2023    WBC 4.53 05/13/2023     C-Reactive Protein   Date Value Ref Range Status   04/13/2023 1.26 (H) 0.00 - 0.50 mg/dL Final   01/26/2022 <0.30 0.00 - 0.50 mg/dL Final   01/07/2022 19.20 (H) 0.00 - 0.50 mg/dL Final     Lactate   Date Value Ref Range Status   03/11/2016 1.5 0.5 - 2.0 mmol/L Final       Temp Readings from Last 1 Encounters:   04/21/23 98.6 °F (37 °C)      Lab Results   Component Value Date    VANCOPEAK 32.90 05/09/2023    VANCOTROUGH 15.70 05/10/2023    VANCORANDOM 38.90 04/28/2023         Culture Results:  Microbiology Results (last 10 days)       ** No results found for the last 240 hours. **              Indication for use: bone/joint infection      Current Vancomycin Dose:  1250 mg IVPB every 12 hours, day 31 of therapy.    Assessment/Plan:  Reviewed above labs and cultures.   No new cultures.  Vancomycin peak and trough levels are ordered for 5/17 at 0630 and 1530.   WBC is 4.9, in goal range. Cr is 0.97, stable. Will continue current dose.  Pharmacy will continue to monitor renal function and adjust dose accordingly.    Sowmya Verduzco, PharmD   05/15/23 09:24 CDT

## 2023-05-15 NOTE — ACP (ADVANCE CARE PLANNING)
Nutrition Services    Patient Name:  Emre Lisa  YOB: 1960  MRN: [id number]  Admit Date:  4/21/2023    Visited pt at lunch time. Understood pt and family to say he was having surgery today so he could not eat. Pt did say the  is good with one PDA and not so much with others who forget his condiments/ dressed condiment for his burger.     Will ensure pt receives good customer service and receives the foods he requests. Pt has increased calorie and protein needs with surgery. His intake has been excellent at 100% all meals recorded by nursing. Weight was recorded as 351lb.down from 359 lb one week ago. RDN staff will continue to monitor.     Electronically signed by:  Kaycee Moise RD  05/15/23 13:04 CDT

## 2023-05-15 NOTE — INTERVAL H&P NOTE
H&P reviewed. Changes as noted.     Plan for removal of external fixator left lower extremity with tibiocalcaneal joint arthrodesis and all indicated procedures. Risks and benefits discussed in detail. No guarantees given or implied.           This document has been electronically signed by Marco A Thompson DPM on May 15, 2023 13:32 CDT      1

## 2023-05-15 NOTE — ACP (ADVANCE CARE PLANNING)
Formerly [facility] @ [facility]  INPATIENT PROGRESS NOTE    PATIENT NAME: Emre Lisa      PHYSICIAN: Charlie Persaud MD  : 1960        MRN: [id number]  Patient Care Team:  Jamaal Brvao MD as PCP - Marco A Chauhan DPM as Consulting Physician (Podiatry)    Chief Complaint:  Continued antibiotic therapy and medical management  History of Present Illness: Patient is a 64 y/o male with a pmh of type 2 DM, hypertension, CAD, and neuropathy.  He recently underwent surgical repair of Charcot deformity with external fixator stabilization to his left foot related to cellulitis.  He tolerated the surgical procedure well.  He will need IV antibiotic therapy through 23 due to bone/joint infection.  He remains non-weight bearing of left lower extremity.  He tells me the pain to his left foot remains controlled, however, he does have a torn left rotator cuff and arthritis so his hip and shoulder are the source of his pain.  He tells me he is doing well and he has no other complaints or concerns.  I have reviewed the patients most recent labs and diagnostics independently.  Glucose 124, BUN 15, creatinine 0.89, sodium 138, potassium 4.2, chloride 105, co2 24, wbc 5.25, hgb 9.9, hct 30.1, platelets 156.       Assessment      Cellulitis of left foot  Charcot's joint of left foot  Type 2 DM  Arthritis  A-fib  Hypertension  Rheumatoid arthritis  DVT & GI prophylaxis     DVT Prophylaxis: Heparin  GI Prophylaxis: Protonix  Code Status: Full    Plan     -Stable at time of exam  -Significant clinical improvement noted.   -Continues to tolerate room air  -Anticoagulation remains on hold.  -Patient remains clinically stable and improved.  -Surgical procedure via Dr. Thompson today.  Will return to floor after procedure.  -DVT and GI prophylaxis in place.  -We'll continue monitoring patient in hospital setting and treat patient as course dictates.  -Please review  orders for detailed plan of care.  -For Acute and Chronic medical condition we will continue medications described below in medication section which have been reviewed and we will continue unless changed in plan of care.  -Laboratory and diagnostic studies have been independently reviewed and the reports are reviewed as documented below.      Subjective   Interval History:   Patient Complaints:   Patient seen and examined.  Pleasant with no concerns.  Denies any pain.  History taken from: Patient, patient's chart.    Review of Systems:    Review of Systems   Constitutional: Positive for activity change. Negative for chills and fever.   HENT: Negative for postnasal drip and tinnitus.    Respiratory: Negative for apnea.    Cardiovascular: Negative for chest pain and palpitations.   Gastrointestinal: Negative for abdominal distention, nausea and vomiting.   Musculoskeletal: Positive for arthralgias and myalgias.   Skin: Positive for wound.   Neurological: Negative for tremors and seizures.   Psychiatric/Behavioral: Negative for agitation, confusion and hallucinations.       Objective   Vital Signs:  Temp: 97.6 F         Heart Rate: 83         Resp: 18          Blood Pressure: 118/66         Pulse Ox: 96%    Physical Exam:   Physical Exam  Vitals reviewed.   Constitutional:       Appearance: Normal appearance. He is not ill-appearing.   HENT:      Head: Normocephalic and atraumatic.      Mouth/Throat:      Mouth: Mucous membranes are moist.      Pharynx: Oropharynx is clear.   Eyes:      Extraocular Movements: Extraocular movements intact.      Pupils: Pupils are equal, round, and reactive to light.   Neck:      Vascular: No carotid bruit.   Cardiovascular:      Rate and Rhythm: Regular rhythm. Tachycardia present.      Pulses: Normal pulses.      Heart sounds: Normal heart sounds. No murmur heard.    No gallop.   Pulmonary:      Effort: Pulmonary effort is normal. No respiratory distress.      Breath sounds: Normal  breath sounds. No rales.   Abdominal:      General: Bowel sounds are normal. There is no distension.      Palpations: Abdomen is soft.      Tenderness: There is no guarding.   Musculoskeletal:         General: Normal range of motion.      Cervical back: Normal range of motion and neck supple.      Right lower leg: Edema present.      Left lower leg: Edema present.   Skin:     General: Skin is warm and dry.      Capillary Refill: Capillary refill takes less than 2 seconds.      Findings: Bruising present.      Comments: S/P L surgical repair Charcot foot with external fixator.  Wound vac in place   Neurological:      General: No focal deficit present.      Mental Status: He is alert and oriented to person, place, and time.   Psychiatric:         Mood and Affect: Mood normal.         Behavior: Behavior normal.         Thought Content: Thought content normal.         Judgment: Judgment normal.       Results Review:       Results from last 7 days   Lab Units 05/15/23  0620 05/14/23  0547 05/13/23  0534 05/12/23  0530 05/11/23  0611 05/10/23  0320 05/09/23  0606   SODIUM mmol/L 138 137 139 137 136 137 138   POTASSIUM mmol/L 4.2 4.1 4.4 4.2 4.2 4.4 4.6   CHLORIDE mmol/L 102 101 103 103 101 102 102   CO2 mmol/L 24.0 24.0 26.0 24.0 25.0 24.0 25.0   BUN mg/dL 24* 24* 26* 28* 23 23 23   CREATININE mg/dL 0.97 1.07 1.06 1.07 1.07 1.06 1.11   GLUCOSE mg/dL 124* 167* 150* 218* 150* 193* 160*   CALCIUM mg/dL 9.1 9.1 9.2 8.8 8.9 9.2 8.8   BILIRUBIN mg/dL 0.8 0.7 0.8 0.7 0.8 0.7 0.9   ALK PHOS U/L 102 104 109 106 114 118* 116   ALT (SGPT) U/L 35 38 40 41 46* 47* 48*   AST (SGOT) U/L 27 24 27 29 28 30 30     Results from last 7 days   Lab Units 05/15/23  0620 05/14/23  0547 05/13/23  0534 05/12/23  0530 05/11/23  0611 05/10/23  0320 05/09/23  0606   MAGNESIUM mg/dL 1.9 1.9 1.9 2.1 2.0 2.1 2.1     Results from last 7 days   Lab Units 05/15/23  0620 05/14/23  0547 05/13/23  0533 05/12/23  0530 05/11/23  0611 05/10/23  0320  05/09/23  0606   WBC 10*3/mm3 4.90 4.95 4.53 4.33 4.60 4.33 5.76   HEMOGLOBIN g/dL 11.8* 11.4* 11.4* 11.3* 11.5* 11.4* 11.0*   HEMATOCRIT % 37.3* 36.9* 36.2* 36.4* 36.4* 36.6* 34.9*   PLATELETS 10*3/mm3 212 217 203 218 204 212 191     Lab Results   Component Value Date    CKTOTAL 119 04/23/2023    CKMB 2.16 04/23/2023    TROPONINI <0.012 11/19/2014    TROPONINT 52 (H) 04/25/2023     CO2   Date Value Ref Range Status   05/15/2023 24.0 22.0 - 29.0 mmol/L Final         Imaging Results (Most Recent)     Procedure Component Value Units Date/Time    XR Ankle 3+ View Left [[id number]] Collected: 05/10/23 1948     Updated: 05/10/23 1955    Narrative:      TECHNIQUE:  AP, oblique and lateral views of the left ankle were obtained.    HISTORY:  Follow-up    COMPARISON:  None.    FINDINGS:  Extreme artifact related to external fixator device.  Surgical skin staples  remain in place.    There has been resection of the distal fibula.  Destructive change about the  ankle joint with some callus formation.  I do not have prior studies for  comparison.  Surgical skin staples remain in place.        US Venous Doppler Lower Extremity Bilateral (duplex) [[id number]] Collected: 04/28/23 2142     Updated: 04/28/23 2154    Narrative:      EXAMINATION:  Ultrasound bilateral lower extremity venous Doppler.    COMPARISON:  No comparison.    HISTORY:  Positive PE, concern for DVT.    TECHNIQUE:  High-resolution gray scale imaging, color flow Doppler, compression were  performed from the groin to the calf of the bilateral lower extremities.  Augmentation was performed of the bilateral common femoral vein and popliteal  vein.    FINDINGS:  There is DVT within both popliteal veins.  The remaining deep veins appear  patent with normal compressibility.    There is a complex collection in the left popliteal fossa containing shadowing  calcification or two calcifications.      Impression:      1.  Bilateral DVT within the popliteal veins.    2.   Complex cystic area in the left popliteal fossa measuring up to 6.2 cm.  This may represent a complex Baker's cyst with loose bodies or calcifications.    US Renal Bilateral [[id number]] Collected: 04/26/23 0820     Updated: 04/26/23 1208    Narrative:      HISTORY:  Urinary retention.    COMPARISON:  None    TECHNIQUE:  Real-time ultrasound imaging and color Doppler used to evaluate the kidneys and  bladder.    FINDINGS:  The right kidney measures 13.1 x 5.9 x 4.5 cm. The left kidney measures 13 x 5.8  x 6.5 cm.  Cortical thickness and echotexture are within normal limits. There is  no hydronephrosis or evidence of mass. No calculus was seen.  Lower pole cyst on  the right measuring 4.9 x 4.1 x 4.6 cm.  This appears to be an anechoic  partially exophytic cyst.    The bladder was collapsed around a Colbert catheter.      Impression:      A 5 cm lower pole right renal cyst appears simple.  No acute sonographic  abnormality.      CT Angiogram Chest [[id number]] Collected: 04/26/23 0831     Updated: 04/26/23 1137    Narrative:      EXAM:  CTA chest with contrast, PE protocol.    COMPARISON:  CTA chest PE protocol, 04/24/2023.    HISTORY:  Pulmonary embolism suspected, unknown D-dimer.  Shortness of breath.    TECHNIQUE:  Intravenous contrast was administered and axial images from the thoracic inlet  through the diaphragm were performed followed by 2D multiplanar reformats.  MIPS  were reconstructed and reviewed.    FINDINGS:  Limited by respiratory motion artifact.    Mild bilateral dependent atelectasis.  Lungs are otherwise clear.  No  consolidation.    Trace bilateral pleural effusions.  Esophagus is collapsed and poorly evaluated.  Mild bilateral gynecomastia.  There is some mild lymphadenopathy suspected in  the upper mediastinum, nonspecific.  No lymphadenopathy identified elsewhere in  the chest by size criteria.    Extensive bilateral pulmonary emboli with saddle embolus extending across the  bifurcation of the  main pulmonary artery.  There is extensive embolic disease  throughout all lobes of both lungs. The Main  pulmonary artery is enlarged  measuring up to 4.6 cm in diameter.  There is abnormal flattening or even  leftward convexity of the intraventricular septum.    Limited evaluation of the upper abdomen due to motion.  No gross acute osseous  abnormality.      Impression:      1.  Extensive bilateral pulmonary emboli with saddle embolus extending across  the main pulmonary artery bifurcation.  There is enlargement of the main  pulmonary artery as well as leftward bowing or convexity of the intraventricular  septum.  These findings may be seen in the setting of right heart strain.    2.  The exam is otherwise limited by motion.  There is mild dependent  atelectasis.  Lungs are otherwise grossly clear.    3.  Mild lymphadenopathy in the upper mediastinum, nonspecific.  Consider  reevaluation on short-term follow-up CT chest.        XR Chest 1 View [[id number]] Collected: 04/25/23 0744     Updated: 04/25/23 0902    Narrative:      HISTORY:  Post code.    COMPARISON:  4/23/2023    FINDINGS:  AP view of the chest demonstrates clear lungs and mild cardiomegaly.  There is  no pneumothorax.  Left-sided AICD is unchanged.      Impression:      Mild cardiomegaly.      CT Angiogram Chest [[id number]] Collected: 04/24/23 0511     Updated: 04/24/23 0658    Narrative:      INDICATION:  Positive D-dimer    COMPARISON:  None    TECHNIQUE:  Volumetric CT scan of the chest, from the thoracic inlet to the level of the  diaphragms, with intravenous contrast.  2D axial, sagittal, and coronal  reformats were performed.  MIPS were performed on a separate workstation and  reviewed.    FINDINGS:  Lungs: No focal consolidation or mass.  Pleura: No effusion or pneumothorax.  Vessels: Thoracic aorta is normal in caliber.  Bones: No suspicious lesions.  Visualized upper abdomen: Unremarkable.      Impression:      I suspect there are bilateral  pulmonary emboli, but it is difficult to tell with  certainty due to suboptimal contrast timing and significant motion artifact.  Repeat examination is recommended.    XR Chest 1 View [[id number]] Collected: 04/23/23 1630     Updated: 04/23/23 1648    Narrative:      HISTORY:  Shortness of breath.    FINDINGS:  Frontal film of the chest was obtained.  There is a dual-lead pacing system in  place via the left subclavian venous approach.  The pacing leads appear to be in  good position.  There is mild pulmonary vascular congestion with mild  cardiomegaly.  No infiltrate or effusion is seen.      Impression:      1.  Mild cardiomegaly with mild pulmonary vascular congestion.    2.  No acute infiltrate or effusion is seen.         No results found for: ACANTHNAEG, AFBCX, BPERTUSSISCX, BLOODCX  No results found for: BCIDPCR, CXREFLEX, CSFCX, CULTURETIS  No results found for: CULTURES, HSVCX, URCX  No results found for: EYECULTURE, GCCX, HSVCULTURE, LABHSV  No results found for: LEGIONELLA, MRSACX, MUMPSCX, MYCOPLASCX  No results found for: NOCARDIACX, STOOLCX  No results found for: THROATCX, UNSTIMCULT, URINECX, CULTURE, VZVCULTUR  No results found for: VIRALCULTU, WOUNDCX  Medication Reviewed and Will Continue Unless Documented in Plan:   [START ON 5/17/2023] !Vancomycin Level Draw Needed, , Does not apply, Once  ascorbic acid, 1,000 mg, Oral, Nightly  ceFAZolin, 2 g, Intravenous, Once  cetirizine, 10 mg, Oral, Daily  vitamin D3, 5,000 Units, Oral, Nightly  collagenase, 1 application, Topical, Q24H  docusate sodium, 200 mg, Oral, BID  dronedarone, 400 mg, Oral, Daily With Dinner  fluticasone, 2 spray, Each Nare, Daily  glipizide, 10 mg, Oral, QAM AC  Insulin Aspart, 2-12 Units, Subcutaneous, 4x Daily With Meals & Nightly  magnesium oxide, 400 mg, Oral, BID  metoprolol succinate XL, 25 mg, Oral, BID  senna-docusate sodium, 2 tablet, Oral, BID  sodium bicarbonate, 650 mg, Oral, TID  sodium chloride, 3 mL, Intravenous,  Q12H  sodium chloride, 100 mL/hr, Intravenous, Once  tamsulosin, 0.4 mg, Oral, BID  vancomycin, 1,250 mg, Intravenous, Q12H      Pharmacy to dose vancomycin,       •  acetaminophen  •  bisacodyl  •  calcium carbonate  •  hydrALAZINE  •  hydrOXYzine pamoate  •  muscle rub  •  ondansetron  •  Pharmacy to dose vancomycin  •  simethicone  •  sodium chloride  •  traZODone  Pharmacy to dose vancomycin,        Dietary Orders (From admission, onward)     Start     Ordered    05/15/23 0157  NPO Diet NPO Type: Strict NPO  Diet Effective Now        Question:  NPO Type  Answer:  Strict NPO    05/15/23 0156              History, physical exam, assessment and plan may have been partly or fully copied from before, but  changes made to the copied record to reflect care on the date of service. Part of the lab and imaging  reports auto populated and corrected. Some of this note may be an electronic transcription of spoken  language to printed text. This may permit erroneous, or at times, nonsensical words or phrases to be  inadvertently transcribed. Although I have reviewed the note for such errors, some may still exist.      This document has been electronically signed by Charlie Persaud MD on May 15, 2023 12:29 CDT

## 2023-05-15 NOTE — BRIEF OP NOTE
TRIPLE ARTHRODESIS  Progress Note    Emre Lisa  5/15/2023    Pre-op Diagnosis:   Charcot's joint of left foot [M14.672]       Post-Op Diagnosis Codes:     * Charcot's joint of left foot [M14.672]    Procedure/CPT® Codes:      Procedure(s):  REMOVAL OF EXTERNAL FIXATOR LEFT LOWER EXTREMITY WITH TIBIAL TALOCALCANEAL ARTHRODESIS  AND ALL INDICATED PROCEDURES        Surgeon(s):  Marco A Thompson DPM    Anesthesia: General    Staff:   Circulator: Maryann Redd RN; Nedra Mckay RN  Radiology Technologist: Toya Roque  Scrub Person: Thu Marte  Assistant: Nedra Mcrae MA  Assistant: Nedra Mcrae MA      Estimated Blood Loss: minimal    Urine Voided: * No values recorded between 5/15/2023  1:58 PM and 5/15/2023  3:36 PM *    Specimens:                None          Drains:   [REMOVED] Open Drain Left Other (Comment) (Removed)   Site Description Unable to view 04/17/23 2020   Dressing Status Clean;Dry;Intact 04/18/23 0813   Drainage Appearance Bloody 04/18/23 0813   Status Other (Comment) 04/17/23 2020   Output (mL) 20 mL 04/18/23 0813       [REMOVED] Urethral Catheter Silicone 16 Fr. (Removed)       [REMOVED] External Urinary Catheter (Removed)   Site Assessment Skin intact;Clean 04/14/23 2055   Application/Removal external catheter applied 04/14/23 2055   Collection Container Wall suction 04/15/23 0800   Securement Method Tape 04/14/23 0800   Catheter care complete Yes 04/13/23 2221   Output (mL) 350 mL 04/15/23 0525       Findings: consistent with pre-op dx    Complications: none    Assistant: Nedra Mcrae MA  was responsible for performing the following activities: Retraction, Suction, Irrigation and Placing Dressing and their skilled assistance was necessary for the success of this case.    Marco A Thompson DPM     Date: 5/15/2023  Time: 15:51 CDT

## 2023-05-15 NOTE — ANESTHESIA POSTPROCEDURE EVALUATION
Patient: Emre Lisa    Procedure Summary     Date: 05/15/23 Room / Location: [facility] OR 11 / [facility] OR    Anesthesia Start: 1358 Anesthesia Stop: 1606    Procedure: REMOVAL OF EXTERNAL FIXATOR LEFT LOWER EXTREMITY WITH TIBIAL TALOCALCANEAL ARTHRODESIS  AND ALL INDICATED PROCEDURES (Left: Ankle) Diagnosis:       Charcot's joint of left foot      (Charcot's joint of left foot [M14.672])    Surgeons: Marco A Thompson DPM Provider: Gibran Irizarry CRNA    Anesthesia Type: general ASA Status: 4          Anesthesia Type: general    Vitals  Vitals Value Taken Time   /62 05/15/23 1558   Temp 97.7 °F (36.5 °C) 05/15/23 1558   Pulse 85 05/15/23 1558   Resp 18 05/15/23 1558   SpO2 93 % 05/15/23 1558           Post Anesthesia Care and Evaluation    Patient location during evaluation: PACU  Patient participation: complete - patient cannot participate  Level of consciousness: sleepy but conscious  Pain score: 0  Pain management: adequate    Airway patency: patent  Anesthetic complications: No anesthetic complications  PONV Status: none  Cardiovascular status: stable, acceptable and hemodynamically stable  Respiratory status: acceptable, nasal cannula and unassisted  Hydration status: acceptable    Comments: /62 (BP Location: Left arm)   Pulse 85   Temp 97.7 °F (36.5 °C) (Temporal)   Resp 18   Wt (!) 170 kg (375 lb 11.2 oz)   SpO2 95%   BMI 50.95 kg/m²

## 2023-05-15 NOTE — ANESTHESIA PROCEDURE NOTES
Airway  Date/Time: 5/15/2023 2:09 PM  End Time:5/15/2023 2:09 PM    General Information and Staff    Patient location during procedure: OR  CRNA/CAA: Gibran Irizarry CRNA  SRNA: Josiane Mcmahan SRNA  Indications and Patient Condition  Indications for airway management: airway protection    Preoxygenated: yes  MILS maintained throughout  Mask difficulty assessment: 0 - not attempted    Final Airway Details  Final airway type: supraglottic airway      Successful airway: I-gel  Size 5     Number of attempts at approach: 1  Assessment: lips, teeth, and gum same as pre-op and atraumatic intubation

## 2023-05-15 NOTE — ANESTHESIA PREPROCEDURE EVALUATION
Anesthesia Evaluation     Patient summary reviewed and Nursing notes reviewed   no history of anesthetic complications:  NPO Solid Status: > 8 hours  NPO Liquid Status: > 2 hours           Airway   Mallampati: II  TM distance: >3 FB  Neck ROM: full  Possible difficult intubation and Large neck circumference  Comment: His neck has decreased extension as well as decreased ability to turn to left or right.  Full trimmed beard.Final Airway Details  Final airway type: supraglottic airway        Successful airway: I-gel  Size 5    Assessment: lips, teeth, and gum same as pre-op and atraumatic intubation     Additional Comments  LMA placed by Lisa Larsen SRNAFinal Airway Details  Final airway type: endotracheal airway        Successful airway: ETT  Cuffed: yes   Successful intubation technique: video laryngoscopy  Facilitating devices/methods: intubating stylet and cricoid pressure  Endotracheal tube insertion site: oral  Blade: Ghotra  Blade size: 4  ETT size (mm): 7.5  Cormack-Lehane Classification: grade IIb - view of arytenoids or posterior of glottis only  Placement verified by: chest auscultation and capnometry   Cuff volume (mL): 8  Measured from: lips  ETT/EBT  to lips (cm): 24  Number of attempts at approach: 1  Assessment: lips, teeth, and gum same as pre-op and atraumatic intubation     Additional Comments  Ramped, Reverse Trend, 5 min pre O2 to ET O2 >80%. Intubation per  Antoine PHILLIPS     Dental    (+) poor dentation        Pulmonary - negative pulmonary ROS    breath sounds clear to auscultation  (-) COPD, asthma, shortness of breath, sleep apnea, not a smoker    ROS comment: Snores.COMPARISON:  4/23/2023     FINDINGS:  AP view of the chest demonstrates clear lungs and mild cardiomegaly.  There is  no pneumothorax.  Left-sided AICD is unchanged.     IMPRESSION:  Mild cardiomegaly.     PE comment: AICD is in left chest wall  Cardiovascular   Exercise tolerance: good (4-7 METS)    ECG reviewed  Patient on  routine beta blocker and Beta blocker given within 24 hours of surgery  Rhythm: regular  Rate: normal    (+) pacemaker ( originally inserted in 2010 and replaced 7/2016. Has not been discharged since placement.) ICD, pacemaker, hypertension well controlled, past MI ( he states he had a silent MI 30 years ago and had clean coronary arteries in a 2010 catheterization.)  >12 months, dysrhythmias Atrial Fib, FINN,   (-) angina, murmur, carotid bruits, cardiac stents    ROS comment: History cardiac defibrillator    Sinus tachycardia  Nonspecific intraventricular conduction delay  Nonspecific ST and T wave abnormality  Abnormal ECG  When compared with ECG of 24-APR-2023 07:55,  Premature ventricular complexes are no longer Present  Inverted T waves have replaced nonspecific T wave abnormality in Inferior leads     Referred By:            Confirmed By: RAGHU HOUSER MD•  Left ventricular ejection fraction appears to be 41 - 45%.  •  The left ventricular cavity is borderline dilated.  •  Left ventricular wall thickness is consistent with mild concentric hypertrophy.  •  Left ventricular diastolic function is consistent with (grade Ia w/high LAP) impaired relaxation.  •  The right atrial cavity is borderline dilated.  •  There is calcification of the aortic valve mainly affecting the non-coronary, left coronary and right coronary cusp(s).  •  Estimated right ventricular systolic pressure from tricuspid regurgitation is normal (<35 mmHg).  •  Mild dilation of the aortic root is present.  •  There is a trivial pericardial effusion.      Neuro/Psych  (+) numbness (estevan feet neuropathy),    (-) seizures, TIA, CVA, headaches, psychiatric history  GI/Hepatic/Renal/Endo    (+) morbid obesity,  diabetes mellitus type 2 well controlled,   (-) hepatitis, liver disease, no renal diseaseGERD:  he is on protonix= ordered pre-op, but he denies having GERD.    Musculoskeletal     (+) arthralgias,       ROS comment: Charcot's foot  Abdominal    (+) obese,    Substance History - negative use     OB/GYN negative ob/gyn ROS         Other   arthritis,      ROS/Med Hx Other: HGB 11.8      Phys Exam Other: Right midline IV catheter.                  Anesthesia Plan    ASA 4     general     (ray available)  intravenous induction     Anesthetic plan, risks, benefits, and alternatives have been provided, discussed and informed consent has been obtained with: patient.  Pre-procedure education provided  Plan discussed with CRNA.

## 2023-05-16 ENCOUNTER — OUTSIDE FACILITY SERVICE (OUTPATIENT)
Dept: PULMONOLOGY | Facility: CLINIC | Age: 63
End: 2023-05-16
Payer: MEDICARE

## 2023-05-16 LAB
GLUCOSE BLDC GLUCOMTR-MCNC: 139 MG/DL (ref 70–130)
GLUCOSE BLDC GLUCOMTR-MCNC: 177 MG/DL (ref 70–130)
GLUCOSE BLDC GLUCOMTR-MCNC: 178 MG/DL (ref 70–130)

## 2023-05-16 PROCEDURE — 97168 OT RE-EVAL EST PLAN CARE: CPT

## 2023-05-16 PROCEDURE — 82948 REAGENT STRIP/BLOOD GLUCOSE: CPT

## 2023-05-16 PROCEDURE — 97164 PT RE-EVAL EST PLAN CARE: CPT

## 2023-05-16 PROCEDURE — 97530 THERAPEUTIC ACTIVITIES: CPT

## 2023-05-16 PROCEDURE — 25010000002 FONDAPARINUX PER 0.5 MG

## 2023-05-16 PROCEDURE — 25010000002 VANCOMYCIN 10 G RECONSTITUTED SOLUTION: Performed by: INTERNAL MEDICINE

## 2023-05-16 RX ORDER — FONDAPARINUX SODIUM 10 MG/.8ML
10 INJECTION SUBCUTANEOUS
Status: DISPENSED | OUTPATIENT
Start: 2023-05-16 | End: 2023-05-21

## 2023-05-16 NOTE — ACP (ADVANCE CARE PLANNING)
[facility] @ [facility]  INPATIENT PROGRESS NOTE    PATIENT NAME: Emre Lisa      PHYSICIAN: Charlie Persaud MD  : 1960        MRN: [id number]  Patient Care Team:  Jamaal Bravo MD as PCP - Marco A Chauhan DPM as Consulting Physician (Podiatry)    Chief Complaint:  Continued antibiotic therapy and medical management  History of Present Illness: Patient is a 62 y/o male with a pmh of type 2 DM, hypertension, CAD, and neuropathy.  He recently underwent surgical repair of Charcot deformity with external fixator stabilization to his left foot related to cellulitis.  He tolerated the surgical procedure well.  He will need IV antibiotic therapy through 23 due to bone/joint infection.  He remains non-weight bearing of left lower extremity.  He tells me the pain to his left foot remains controlled, however, he does have a torn left rotator cuff and arthritis so his hip and shoulder are the source of his pain.  He tells me he is doing well and he has no other complaints or concerns.  I have reviewed the patients most recent labs and diagnostics independently.  Glucose 124, BUN 15, creatinine 0.89, sodium 138, potassium 4.2, chloride 105, co2 24, wbc 5.25, hgb 9.9, hct 30.1, platelets 156.       Assessment      Cellulitis of left foot  Charcot's joint of left foot  Type 2 DM  Arthritis  A-fib  Hypertension  Rheumatoid arthritis  DVT & GI prophylaxis  External fixator removal (05/15/2023)     DVT Prophylaxis: Heparin  GI Prophylaxis: Protonix  Code Status: Full    Plan     -Stable at time of exam  -Tolerated surgical procedure well.   -Pain management in place and effective.  -Anticoagulation re-initiated  -Has boot in place on left foot.   -Continues to tolerate room air  -Patient remains clinically stable and improved.  -DVT and GI prophylaxis in place.  -We'll continue monitoring patient in hospital setting and treat patient as course  dictates.  -Please review orders for detailed plan of care.  -For Acute and Chronic medical condition we will continue medications described below in medication section which have been reviewed and we will continue unless changed in plan of care.  -Laboratory and diagnostic studies have been independently reviewed and the reports are reviewed as documented below.      Subjective   Interval History:   Patient Complaints:   Patient seen and examined.  Resting comfortably in bed.  No overnight concerns.  Denies pain.  History taken from: Patient, patient's chart.    Review of Systems:    Review of Systems   Constitutional: Positive for activity change. Negative for chills and fever.   HENT: Negative for postnasal drip and tinnitus.    Respiratory: Negative for apnea.    Cardiovascular: Negative for chest pain and palpitations.   Gastrointestinal: Negative for abdominal distention, nausea and vomiting.   Musculoskeletal: Positive for arthralgias and myalgias.   Skin: Positive for wound.   Neurological: Negative for tremors and seizures.   Psychiatric/Behavioral: Negative for agitation, confusion and hallucinations.       Objective   Vital Signs:  Temp: 98.8 F         Heart Rate: 89         Resp: 20          Blood Pressure: 105/63         Pulse Ox: 96%    Physical Exam:   Physical Exam  Vitals reviewed.   Constitutional:       Appearance: Normal appearance. He is not ill-appearing.   HENT:      Head: Normocephalic and atraumatic.      Mouth/Throat:      Mouth: Mucous membranes are moist.      Pharynx: Oropharynx is clear.   Eyes:      Extraocular Movements: Extraocular movements intact.      Pupils: Pupils are equal, round, and reactive to light.   Neck:      Vascular: No carotid bruit.   Cardiovascular:      Rate and Rhythm: Regular rhythm. Tachycardia present.      Pulses: Normal pulses.      Heart sounds: Normal heart sounds. No murmur heard.    No gallop.   Pulmonary:      Effort: Pulmonary effort is normal. No  respiratory distress.      Breath sounds: Normal breath sounds. No rales.   Abdominal:      General: Bowel sounds are normal. There is no distension.      Palpations: Abdomen is soft.      Tenderness: There is no guarding.   Musculoskeletal:         General: Normal range of motion.      Cervical back: Normal range of motion and neck supple.      Right lower leg: Edema present.      Left lower leg: Edema present.   Skin:     General: Skin is warm and dry.      Capillary Refill: Capillary refill takes less than 2 seconds.      Findings: Bruising present.      Comments: S/P L surgical repair Charcot foot with boot in place     Neurological:      General: No focal deficit present.      Mental Status: He is alert and oriented to person, place, and time.   Psychiatric:         Mood and Affect: Mood normal.         Behavior: Behavior normal.         Thought Content: Thought content normal.         Judgment: Judgment normal.       Results Review:       Results from last 7 days   Lab Units 05/15/23  0620 05/14/23  0547 05/13/23  0534 05/12/23  0530 05/11/23  0611 05/10/23  0320   SODIUM mmol/L 138 137 139 137 136 137   POTASSIUM mmol/L 4.2 4.1 4.4 4.2 4.2 4.4   CHLORIDE mmol/L 102 101 103 103 101 102   CO2 mmol/L 24.0 24.0 26.0 24.0 25.0 24.0   BUN mg/dL 24* 24* 26* 28* 23 23   CREATININE mg/dL 0.97 1.07 1.06 1.07 1.07 1.06   GLUCOSE mg/dL 124* 167* 150* 218* 150* 193*   CALCIUM mg/dL 9.1 9.1 9.2 8.8 8.9 9.2   BILIRUBIN mg/dL 0.8 0.7 0.8 0.7 0.8 0.7   ALK PHOS U/L 102 104 109 106 114 118*   ALT (SGPT) U/L 35 38 40 41 46* 47*   AST (SGOT) U/L 27 24 27 29 28 30     Results from last 7 days   Lab Units 05/15/23  0620 05/14/23  0547 05/13/23  0534 05/12/23  0530 05/11/23  0611 05/10/23  0320   MAGNESIUM mg/dL 1.9 1.9 1.9 2.1 2.0 2.1     Results from last 7 days   Lab Units 05/15/23  0620 05/14/23  0547 05/13/23  0533 05/12/23  0530 05/11/23  0611 05/10/23  0320   WBC 10*3/mm3 4.90 4.95 4.53 4.33 4.60 4.33   HEMOGLOBIN g/dL 11.8*  11.4* 11.4* 11.3* 11.5* 11.4*   HEMATOCRIT % 37.3* 36.9* 36.2* 36.4* 36.4* 36.6*   PLATELETS 10*3/mm3 212 217 203 218 204 212     Lab Results   Component Value Date    CKTOTAL 119 04/23/2023    CKMB 2.16 04/23/2023    TROPONINI <0.012 11/19/2014    TROPONINT 52 (H) 04/25/2023     CO2   Date Value Ref Range Status   05/15/2023 24.0 22.0 - 29.0 mmol/L Final         Imaging Results (Most Recent)     None         No results found for: ACANTHNAEG, AFBCX, BPERTUSSISCX, BLOODCX  No results found for: BCIDPCR, CXREFLEX, CSFCX, CULTURETIS  No results found for: CULTURES, HSVCX, URCX  No results found for: EYECULTURE, GCCX, HSVCULTURE, LABHSV  No results found for: LEGIONELLA, MRSACX, MUMPSCX, MYCOPLASCX  No results found for: NOCARDIACX, STOOLCX  No results found for: THROATCX, UNSTIMCULT, URINECX, CULTURE, VZVCULTUR  No results found for: VIRALCULTU, WOUNDCX  Medication Reviewed and Will Continue Unless Documented in Plan:   [START ON 5/18/2023] !Vancomycin Level Draw Needed, , Does not apply, Once  ascorbic acid, 1,000 mg, Oral, Nightly  cetirizine, 10 mg, Oral, Daily  vitamin D3, 5,000 Units, Oral, Nightly  docusate sodium, 200 mg, Oral, BID  dronedarone, 400 mg, Oral, Daily With Dinner  fluticasone, 2 spray, Each Nare, Daily  fondaparinux, 10 mg, Subcutaneous, Q24H  glipizide, 10 mg, Oral, QAM AC  Insulin Aspart, 2-12 Units, Subcutaneous, 4x Daily With Meals & Nightly  magnesium oxide, 400 mg, Oral, BID  metoprolol succinate XL, 25 mg, Oral, BID  senna-docusate sodium, 2 tablet, Oral, BID  sodium bicarbonate, 650 mg, Oral, TID  tamsulosin, 0.4 mg, Oral, BID  vancomycin, 1,250 mg, Intravenous, Q12H      Pharmacy to dose vancomycin,       •  acetaminophen  •  bisacodyl  •  calcium carbonate  •  dextrose  •  hydrALAZINE  •  hydrOXYzine pamoate  •  muscle rub  •  ondansetron  •  oxyCODONE-acetaminophen  •  Pharmacy to dose vancomycin  •  simethicone  •  sodium chloride  •  traZODone  Pharmacy to dose vancomycin,         Dietary Orders (From admission, onward)     Start     Ordered    05/15/23 1824  Diet: Regular/House Diet, Diabetic Diets; Consistent Carbohydrate; Texture: Regular Texture (IDDSI 7); Fluid Consistency: Thin (IDDSI 0)  Diet Effective Now        References:    Diet Order Crosswalk   Question Answer Comment   Diets: Regular/House Diet    Diets: Diabetic Diets    Diabetic Diet: Consistent Carbohydrate    Texture: Regular Texture (IDDSI 7)    Fluid Consistency: Thin (IDDSI 0)        05/15/23 1827              History, physical exam, assessment and plan may have been partly or fully copied from before, but  changes made to the copied record to reflect care on the date of service. Part of the lab and imaging  reports auto populated and corrected. Some of this note may be an electronic transcription of spoken  language to printed text. This may permit erroneous, or at times, nonsensical words or phrases to be  inadvertently transcribed. Although I have reviewed the note for such errors, some may still exist.      This document has been electronically signed by Charlie Persaud MD on May 16, 2023 14:51 CDT

## 2023-05-16 NOTE — PROGRESS NOTES
Pharmacokinetics by Pharmacy - Vancomycin    Emre Lisa is a 63 y.o. male  [Ht:182.9 cm  ; Wt: 170 kg ] is being treated for bone/joint infection, day 32 of therapy    Estimated Creatinine Clearance: 130.1 mL/min (by C-G formula based on SCr of 0.97 mg/dL).   Creatinine   Date Value Ref Range Status   05/15/2023 0.97 0.76 - 1.27 mg/dL Final   05/14/2023 1.07 0.76 - 1.27 mg/dL Final   05/13/2023 1.06 0.76 - 1.27 mg/dL Final   11/08/2022 1.0 0.7 - 1.3 mg/dL Final      Lab Results   Component Value Date    WBC 4.90 05/15/2023    WBC 4.95 05/14/2023    WBC 4.53 05/13/2023     C-Reactive Protein   Date Value Ref Range Status   04/13/2023 1.26 (H) 0.00 - 0.50 mg/dL Final   01/26/2022 <0.30 0.00 - 0.50 mg/dL Final   01/07/2022 19.20 (H) 0.00 - 0.50 mg/dL Final     Lactate   Date Value Ref Range Status   03/11/2016 1.5 0.5 - 2.0 mmol/L Final       Temp Readings from Last 1 Encounters:   05/15/23 97.9 °F (36.6 °C) (Temporal)      Lab Results   Component Value Date    VANCOPEAK 32.90 05/09/2023    VANCOTROUGH 15.70 05/10/2023    VANCORANDOM 38.90 04/28/2023         Culture Results:  Microbiology Results (last 10 days)       ** No results found for the last 240 hours. **              Indication for use: bone/joint infection,    Current Vancomycin Dose:  1250 mg IVPB every 12 hours, day 32 of therapy.     Assessment/Plan:  Reviewed above labs and cultures.    No new cultures  Pt did have procedure yesterday for removal of external fixator.  Dosing times were adjusted yesterday due to procedure.  Will continue current dose.  Will re-order levels for 05/17 at 2200 and 5/18 at 0700. Pharmacy will continue to monitor renal function and adjust dose accordingly.    Sowmya Verduzco, PharmD   05/16/23 08:40 CDT

## 2023-05-17 ENCOUNTER — OUTSIDE FACILITY SERVICE (OUTPATIENT)
Dept: PULMONOLOGY | Facility: CLINIC | Age: 63
End: 2023-05-17
Payer: MEDICARE

## 2023-05-17 LAB
GLUCOSE BLDC GLUCOMTR-MCNC: 110 MG/DL (ref 70–130)
GLUCOSE BLDC GLUCOMTR-MCNC: 157 MG/DL (ref 70–130)
GLUCOSE BLDC GLUCOMTR-MCNC: 168 MG/DL (ref 70–130)
GLUCOSE BLDC GLUCOMTR-MCNC: 227 MG/DL (ref 70–130)
GLUCOSE BLDC GLUCOMTR-MCNC: 279 MG/DL (ref 70–130)
VANCOMYCIN PEAK SERPL-MCNC: 28.9 MCG/ML (ref 20–40)

## 2023-05-17 PROCEDURE — 97535 SELF CARE MNGMENT TRAINING: CPT

## 2023-05-17 PROCEDURE — 25010000002 VANCOMYCIN 10 G RECONSTITUTED SOLUTION: Performed by: INTERNAL MEDICINE

## 2023-05-17 PROCEDURE — 97110 THERAPEUTIC EXERCISES: CPT

## 2023-05-17 PROCEDURE — 25010000002 FONDAPARINUX PER 0.5 MG

## 2023-05-17 PROCEDURE — 97530 THERAPEUTIC ACTIVITIES: CPT

## 2023-05-17 PROCEDURE — 80202 ASSAY OF VANCOMYCIN: CPT | Performed by: INTERNAL MEDICINE

## 2023-05-17 PROCEDURE — 82948 REAGENT STRIP/BLOOD GLUCOSE: CPT

## 2023-05-17 NOTE — PROGRESS NOTES
Pharmacokinetics by Pharmacy - Vancomycin    Emre Lisa is a 63 y.o. male receiving vancomycin day 33 for bone and/or joint infection    Objective:  [Ht: 182.9 cm; Wt: 170 kg]     WBC   Date Value Ref Range Status   05/15/2023 4.90 3.40 - 10.80 10*3/mm3 Final    No results found for: CRP, LACTATE   Temp Readings from Last 1 Encounters:   05/15/23 97.9 °F (36.6 °C) (Temporal)     Estimated Creatinine Clearance: 130.1 mL/min (by C-G formula based on SCr of 0.97 mg/dL).   Creatinine   Date Value Ref Range Status   05/15/2023 0.97 0.76 - 1.27 mg/dL Final       No results found for: VANCOPEAK, VANCOTROUGH, VANCORANDOM    Culture Results:  Microbiology Results (last 10 days)     ** No results found for the last 240 hours. **        No results found for: RESPCX    Assessment:    No labs today. Will continue to monitor trends  No new cultures   Vancomycin trough and AUC goals 10-20 and 400-600, respectively    Plan:  1. Continue vancomycin 1250mg IV Q12H. Consult ends 5/26/23.  2. Vancomycin peak on 5/17 at 2200 and vancomycin trough on 5/18 at 0700.  3. Pharmacy will monitor renal function and adjust dose accordingly.      Eloy Mendoza SONG   05/17/23 08:06 CDT

## 2023-05-17 NOTE — ACP (ADVANCE CARE PLANNING)
[facility] @ [facility]  INPATIENT PROGRESS NOTE    PATIENT NAME: Emre Lisa      PHYSICIAN: Charlie Persaud MD  : 1960        MRN: [id number]  Patient Care Team:  Jamaal Bravo MD as PCP - Marco A Chauhan DPM as Consulting Physician (Podiatry)    Chief Complaint:  Continued antibiotic therapy and medical management  History of Present Illness: Patient is a 64 y/o male with a pmh of type 2 DM, hypertension, CAD, and neuropathy.  He recently underwent surgical repair of Charcot deformity with external fixator stabilization to his left foot related to cellulitis.  He tolerated the surgical procedure well.  He will need IV antibiotic therapy through 23 due to bone/joint infection.  He remains non-weight bearing of left lower extremity.  He tells me the pain to his left foot remains controlled, however, he does have a torn left rotator cuff and arthritis so his hip and shoulder are the source of his pain.  He tells me he is doing well and he has no other complaints or concerns.  I have reviewed the patients most recent labs and diagnostics independently.  Glucose 124, BUN 15, creatinine 0.89, sodium 138, potassium 4.2, chloride 105, co2 24, wbc 5.25, hgb 9.9, hct 30.1, platelets 156.       Assessment      Cellulitis of left foot  Charcot's joint of left foot  Type 2 DM  Arthritis  A-fib  Hypertension  Rheumatoid arthritis  DVT & GI prophylaxis  External fixator removal (05/15/2023)     DVT Prophylaxis: Heparin  GI Prophylaxis: Protonix  Code Status: Full    Plan     -Stable at time of exam  -Tolerated surgical procedure well.   -Pain management in place and effective.  -Anticoagulation continues.  -Seems to be improving   -Has boot in place on left foot.   -Continues to tolerate room air  -Patient remains clinically stable and improved.  -DVT and GI prophylaxis in place.  -We'll continue monitoring patient in hospital setting and  treat patient as course dictates.  -Please review orders for detailed plan of care.  -For Acute and Chronic medical condition we will continue medications described below in medication section which have been reviewed and we will continue unless changed in plan of care.  -Laboratory and diagnostic studies have been independently reviewed and the reports are reviewed as documented below.      Subjective   Interval History:   Patient Complaints:   Patient seen and examined.  Sitting up on side of bed.  Resting comfortably with no concerns.  Denies any pain.  No overnight events noted.  History taken from: Patient, patient's chart.    Review of Systems:    Review of Systems   Constitutional: Positive for activity change. Negative for chills and fever.   HENT: Negative for postnasal drip and tinnitus.    Respiratory: Negative for apnea.    Cardiovascular: Negative for chest pain and palpitations.   Gastrointestinal: Negative for abdominal distention, nausea and vomiting.   Musculoskeletal: Positive for arthralgias and myalgias.   Skin: Positive for wound.   Neurological: Negative for tremors and seizures.   Psychiatric/Behavioral: Negative for agitation, confusion and hallucinations.       Objective   Vital Signs:  Temp: 97.7 F         Heart Rate: 89         Resp: 18          Blood Pressure: 101/59         Pulse Ox: 99%    Physical Exam:   Physical Exam  Vitals reviewed.   Constitutional:       Appearance: Normal appearance. He is not ill-appearing.   HENT:      Head: Normocephalic and atraumatic.      Mouth/Throat:      Mouth: Mucous membranes are moist.      Pharynx: Oropharynx is clear.   Eyes:      Extraocular Movements: Extraocular movements intact.      Pupils: Pupils are equal, round, and reactive to light.   Neck:      Vascular: No carotid bruit.   Cardiovascular:      Rate and Rhythm: Regular rhythm. Tachycardia present.      Pulses: Normal pulses.      Heart sounds: Normal heart sounds. No murmur heard.    No  gallop.   Pulmonary:      Effort: Pulmonary effort is normal. No respiratory distress.      Breath sounds: Normal breath sounds. No rales.   Abdominal:      General: Bowel sounds are normal. There is no distension.      Palpations: Abdomen is soft.      Tenderness: There is no guarding.   Musculoskeletal:         General: Normal range of motion.      Cervical back: Normal range of motion and neck supple.      Right lower leg: Edema present.      Left lower leg: Edema present.   Skin:     General: Skin is warm and dry.      Capillary Refill: Capillary refill takes less than 2 seconds.      Findings: Bruising present.      Comments: S/P L surgical repair Charcot foot with boot in place     Neurological:      General: No focal deficit present.      Mental Status: He is alert and oriented to person, place, and time.   Psychiatric:         Mood and Affect: Mood normal.         Behavior: Behavior normal.         Thought Content: Thought content normal.         Judgment: Judgment normal.       Results Review:       Results from last 7 days   Lab Units 05/15/23  0620 05/14/23  0547 05/13/23  0534 05/12/23  0530 05/11/23  0611   SODIUM mmol/L 138 137 139 137 136   POTASSIUM mmol/L 4.2 4.1 4.4 4.2 4.2   CHLORIDE mmol/L 102 101 103 103 101   CO2 mmol/L 24.0 24.0 26.0 24.0 25.0   BUN mg/dL 24* 24* 26* 28* 23   CREATININE mg/dL 0.97 1.07 1.06 1.07 1.07   GLUCOSE mg/dL 124* 167* 150* 218* 150*   CALCIUM mg/dL 9.1 9.1 9.2 8.8 8.9   BILIRUBIN mg/dL 0.8 0.7 0.8 0.7 0.8   ALK PHOS U/L 102 104 109 106 114   ALT (SGPT) U/L 35 38 40 41 46*   AST (SGOT) U/L 27 24 27 29 28     Results from last 7 days   Lab Units 05/15/23  0620 05/14/23  0547 05/13/23  0534 05/12/23  0530 05/11/23  0611   MAGNESIUM mg/dL 1.9 1.9 1.9 2.1 2.0     Results from last 7 days   Lab Units 05/15/23  0620 05/14/23  0547 05/13/23  0533 05/12/23  0530 05/11/23  0611   WBC 10*3/mm3 4.90 4.95 4.53 4.33 4.60   HEMOGLOBIN g/dL 11.8* 11.4* 11.4* 11.3* 11.5*   HEMATOCRIT %  37.3* 36.9* 36.2* 36.4* 36.4*   PLATELETS 10*3/mm3 212 217 203 218 204     Lab Results   Component Value Date    CKTOTAL 119 04/23/2023    CKMB 2.16 04/23/2023    TROPONINI <0.012 11/19/2014    TROPONINT 52 (H) 04/25/2023     CO2   Date Value Ref Range Status   05/15/2023 24.0 22.0 - 29.0 mmol/L Final         Imaging Results (Most Recent)     None         No results found for: ACANTHNAEG, AFBCX, BPERTUSSISCX, BLOODCX  No results found for: BCIDPCR, CXREFLEX, CSFCX, CULTURETIS  No results found for: CULTURES, HSVCX, URCX  No results found for: EYECULTURE, GCCX, HSVCULTURE, LABHSV  No results found for: LEGIONELLA, MRSACX, MUMPSCX, MYCOPLASCX  No results found for: NOCARDIACX, STOOLCX  No results found for: THROATCX, UNSTIMCULT, URINECX, CULTURE, VZVCULTUR  No results found for: VIRALCULTU, WOUNDCX  Medication Reviewed and Will Continue Unless Documented in Plan:   [START ON 5/18/2023] !Vancomycin Level Draw Needed, , Does not apply, Once  ascorbic acid, 1,000 mg, Oral, Nightly  cetirizine, 10 mg, Oral, Daily  vitamin D3, 5,000 Units, Oral, Nightly  docusate sodium, 200 mg, Oral, BID  dronedarone, 400 mg, Oral, Daily With Dinner  fluticasone, 2 spray, Each Nare, Daily  fondaparinux, 10 mg, Subcutaneous, Q24H  glipizide, 10 mg, Oral, QAM AC  Insulin Aspart, 2-12 Units, Subcutaneous, 4x Daily With Meals & Nightly  magnesium oxide, 400 mg, Oral, BID  metoprolol succinate XL, 25 mg, Oral, BID  senna-docusate sodium, 2 tablet, Oral, BID  sodium bicarbonate, 650 mg, Oral, TID  tamsulosin, 0.4 mg, Oral, BID  vancomycin, 1,250 mg, Intravenous, Q12H      Pharmacy to dose vancomycin,       •  acetaminophen  •  bisacodyl  •  calcium carbonate  •  dextrose  •  hydrALAZINE  •  hydrOXYzine pamoate  •  muscle rub  •  ondansetron  •  oxyCODONE-acetaminophen  •  Pharmacy to dose vancomycin  •  simethicone  •  sodium chloride  •  traZODone  Pharmacy to dose vancomycin,        Dietary Orders (From admission, onward)     Start      Ordered    05/15/23 1824  Diet: Regular/House Diet, Diabetic Diets; Consistent Carbohydrate; Texture: Regular Texture (IDDSI 7); Fluid Consistency: Thin (IDDSI 0)  Diet Effective Now        References:    Diet Order Crosswalk   Question Answer Comment   Diets: Regular/House Diet    Diets: Diabetic Diets    Diabetic Diet: Consistent Carbohydrate    Texture: Regular Texture (IDDSI 7)    Fluid Consistency: Thin (IDDSI 0)        05/15/23 1827              History, physical exam, assessment and plan may have been partly or fully copied from before, but  changes made to the copied record to reflect care on the date of service. Part of the lab and imaging  reports auto populated and corrected. Some of this note may be an electronic transcription of spoken  language to printed text. This may permit erroneous, or at times, nonsensical words or phrases to be  inadvertently transcribed. Although I have reviewed the note for such errors, some may still exist.      This document has been electronically signed by Charlie Persaud MD on May 17, 2023 08:48 CDT

## 2023-05-18 ENCOUNTER — OUTSIDE FACILITY SERVICE (OUTPATIENT)
Dept: PULMONOLOGY | Facility: CLINIC | Age: 63
End: 2023-05-18
Payer: MEDICARE

## 2023-05-18 LAB
CREAT SERPL-MCNC: 1.01 MG/DL (ref 0.76–1.27)
EGFRCR SERPLBLD CKD-EPI 2021: 84.6 ML/MIN/1.73
GLUCOSE BLDC GLUCOMTR-MCNC: 128 MG/DL (ref 70–130)
GLUCOSE BLDC GLUCOMTR-MCNC: 129 MG/DL (ref 70–130)
GLUCOSE BLDC GLUCOMTR-MCNC: 141 MG/DL (ref 70–130)
VANCOMYCIN TROUGH SERPL-MCNC: 15.6 MCG/ML (ref 5–20)

## 2023-05-18 PROCEDURE — 82948 REAGENT STRIP/BLOOD GLUCOSE: CPT

## 2023-05-18 PROCEDURE — 80202 ASSAY OF VANCOMYCIN: CPT | Performed by: INTERNAL MEDICINE

## 2023-05-18 PROCEDURE — 82565 ASSAY OF CREATININE: CPT | Performed by: INTERNAL MEDICINE

## 2023-05-18 PROCEDURE — 25010000002 VANCOMYCIN 10 G RECONSTITUTED SOLUTION: Performed by: INTERNAL MEDICINE

## 2023-05-18 PROCEDURE — 25010000002 FONDAPARINUX PER 0.5 MG

## 2023-05-18 PROCEDURE — 97530 THERAPEUTIC ACTIVITIES: CPT

## 2023-05-18 RX ORDER — FLUTICASONE PROPIONATE 50 MCG
2 SPRAY, SUSPENSION (ML) NASAL DAILY
Status: DISCONTINUED | OUTPATIENT
Start: 2023-05-18 | End: 2023-05-30 | Stop reason: HOSPADM

## 2023-05-18 NOTE — PROGRESS NOTES
Pharmacokinetics by Pharmacy - Vancomycin    Emre Lisa is a 63 y.o. male receiving vancomycin day 34 for bone and/or joint infection    Objective:  [Ht: 182.9 cm; Wt: 170 kg]     No results found for: WBC No results found for: CRP, LACTATE   Temp Readings from Last 1 Encounters:   05/15/23 97.9 °F (36.6 °C) (Temporal)     Estimated Creatinine Clearance: 130.1 mL/min (by C-G formula based on SCr of 0.97 mg/dL).   No results found for: CREATININE    Vancomycin Peak   Date Value Ref Range Status   05/17/2023 28.90 20.00 - 40.00 mcg/mL Final     Vancomycin Trough   Date Value Ref Range Status   05/18/2023 15.60 5.00 - 20.00 mcg/mL Final       Culture Results:  Microbiology Results (last 10 days)     ** No results found for the last 240 hours. **        No results found for: RESPCX    Assessment:    No WBC today  Scr 1.01, WNL and stable  No new cultures.     Vancomycin peak resulted at 28.9 (goal 30-40) and was drawn 30 minutes after the end of infusion. The trough resulted at 15.6 (goal 10-20) and was drawn appropriately. This gives a predicted AUC of 535.4 (goal 400-600).     Plan:  1. Continue vancomycin 1250mg IV Q12H. Consult ends on 5/26/23.  2. Will order levels when clinically indicated.  3. Pharmacy will monitor renal function and adjust dose accordingly.      Eloy Mendoza [facility]   05/18/23 08:29 CDT

## 2023-05-18 NOTE — ACP (ADVANCE CARE PLANNING)
Spoke with pt after he ate his lunch. He said it was good. 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 Spoke with pt re using a protein modular to help promote healing of his stage 3 pressure injury. Pt was reluctant but agreed to protein powder to be added to his hot tea at breakfast and his cold tea at dinner. He did not want them added to foods he doesn't eat such as cream soup or mashed potatoes.    Encouraged pt to follow guidelines to position himself OFF this area to allow the PI to heal. Pt agreed.     Will note acceptance of the protein modular added to his beverages bid.     He refused the Evert which has an orange or fruit punch flavor since he doesn't like orange juice or fruit juice and punch flavor and he dislikes the beverages and foods to add them to.    Intake continues to be 100 % all meals. Pt said he is getting enough to eat. Avoids certain foods since he has diabetes , but doesn't want to discuss the diabetic diet.    RDN staff will continue to monitor.

## 2023-05-19 ENCOUNTER — OUTSIDE FACILITY SERVICE (OUTPATIENT)
Dept: PULMONOLOGY | Facility: CLINIC | Age: 63
End: 2023-05-19
Payer: MEDICARE

## 2023-05-19 LAB
ALBUMIN SERPL-MCNC: 3.5 G/DL (ref 3.5–5.2)
ALBUMIN/GLOB SERPL: 1.2 G/DL
ALP SERPL-CCNC: 79 U/L (ref 39–117)
ALT SERPL W P-5'-P-CCNC: 20 U/L (ref 1–41)
ANION GAP SERPL CALCULATED.3IONS-SCNC: 11 MMOL/L (ref 5–15)
AST SERPL-CCNC: 18 U/L (ref 1–40)
BILIRUB SERPL-MCNC: 0.7 MG/DL (ref 0–1.2)
BUN SERPL-MCNC: 20 MG/DL (ref 8–23)
BUN/CREAT SERPL: 20.8 (ref 7–25)
CALCIUM SPEC-SCNC: 8.7 MG/DL (ref 8.6–10.5)
CHLORIDE SERPL-SCNC: 102 MMOL/L (ref 98–107)
CO2 SERPL-SCNC: 24 MMOL/L (ref 22–29)
CREAT SERPL-MCNC: 0.96 MG/DL (ref 0.76–1.27)
DEPRECATED RDW RBC AUTO: 46.6 FL (ref 37–54)
EGFRCR SERPLBLD CKD-EPI 2021: 89.9 ML/MIN/1.73
ERYTHROCYTE [DISTWIDTH] IN BLOOD BY AUTOMATED COUNT: 14.4 % (ref 12.3–15.4)
GLOBULIN UR ELPH-MCNC: 3 GM/DL
GLUCOSE SERPL-MCNC: 126 MG/DL (ref 65–99)
HCT VFR BLD AUTO: 30.7 % (ref 37.5–51)
HGB BLD-MCNC: 9.7 G/DL (ref 13–17.7)
MAGNESIUM SERPL-MCNC: 1.9 MG/DL (ref 1.6–2.4)
MCH RBC QN AUTO: 28.1 PG (ref 26.6–33)
MCHC RBC AUTO-ENTMCNC: 31.6 G/DL (ref 31.5–35.7)
MCV RBC AUTO: 89 FL (ref 79–97)
PLATELET # BLD AUTO: 164 10*3/MM3 (ref 140–450)
PMV BLD AUTO: 10.4 FL (ref 6–12)
POTASSIUM SERPL-SCNC: 4.2 MMOL/L (ref 3.5–5.2)
PROT SERPL-MCNC: 6.5 G/DL (ref 6–8.5)
RBC # BLD AUTO: 3.45 10*6/MM3 (ref 4.14–5.8)
SODIUM SERPL-SCNC: 137 MMOL/L (ref 136–145)
WBC NRBC COR # BLD: 4.21 10*3/MM3 (ref 3.4–10.8)

## 2023-05-19 PROCEDURE — 97530 THERAPEUTIC ACTIVITIES: CPT

## 2023-05-19 PROCEDURE — 83735 ASSAY OF MAGNESIUM: CPT | Performed by: INTERNAL MEDICINE

## 2023-05-19 PROCEDURE — 25010000002 FONDAPARINUX PER 0.5 MG

## 2023-05-19 PROCEDURE — 80053 COMPREHEN METABOLIC PANEL: CPT | Performed by: INTERNAL MEDICINE

## 2023-05-19 PROCEDURE — 85027 COMPLETE CBC AUTOMATED: CPT | Performed by: INTERNAL MEDICINE

## 2023-05-19 PROCEDURE — 25010000002 VANCOMYCIN 10 G RECONSTITUTED SOLUTION: Performed by: INTERNAL MEDICINE

## 2023-05-19 PROCEDURE — 82948 REAGENT STRIP/BLOOD GLUCOSE: CPT

## 2023-05-19 NOTE — ACP (ADVANCE CARE PLANNING)
[facility] @ [facility]  INPATIENT PROGRESS NOTE    PATIENT NAME: Emre Lisa      PHYSICIAN: Charlie Persaud MD  : 1960        MRN: [id number]  Patient Care Team:  Jamaal Bravo MD as PCP - Marco A Chauhan DPM as Consulting Physician (Podiatry)    Chief Complaint:  Continued antibiotic therapy and medical management  History of Present Illness: Patient is a 62 y/o male with a pmh of type 2 DM, hypertension, CAD, and neuropathy.  He recently underwent surgical repair of Charcot deformity with external fixator stabilization to his left foot related to cellulitis.  He tolerated the surgical procedure well.  He will need IV antibiotic therapy through 23 due to bone/joint infection.  He remains non-weight bearing of left lower extremity.  He tells me the pain to his left foot remains controlled, however, he does have a torn left rotator cuff and arthritis so his hip and shoulder are the source of his pain.  He tells me he is doing well and he has no other complaints or concerns.  I have reviewed the patients most recent labs and diagnostics independently.  Glucose 124, BUN 15, creatinine 0.89, sodium 138, potassium 4.2, chloride 105, co2 24, wbc 5.25, hgb 9.9, hct 30.1, platelets 156.       Assessment      Cellulitis of left foot  Charcot's joint of left foot  Type 2 DM  Arthritis  A-fib  Hypertension  Rheumatoid arthritis  DVT & GI prophylaxis  External fixator removal (05/15/2023)     DVT Prophylaxis: Heparin  GI Prophylaxis: Protonix  Code Status: Full    Plan     -Stable at time of exam  -Participating in therapy  -Pain management in place and effective.  -Anticoagulation continues.  -Seems to be improving   -Has boot in place on left foot.   -Patient remains clinically stable and improved.  -DVT and GI prophylaxis in place.  -We'll continue monitoring patient in hospital setting and treat patient as course dictates.  -Please  review orders for detailed plan of care.  -For Acute and Chronic medical condition we will continue medications described below in medication section which have been reviewed and we will continue unless changed in plan of care.  -Laboratory and diagnostic studies have been independently reviewed and the reports are reviewed as documented below.      Subjective   Interval History:   Patient Complaints:   Patient seen and examined. Resting comfortably in bed.  No overnight events noted.   History taken from: Patient, patient's chart.    Review of Systems:    Review of Systems   Constitutional: Positive for activity change. Negative for chills and fever.   HENT: Negative for postnasal drip and tinnitus.    Respiratory: Negative for apnea.    Cardiovascular: Negative for chest pain and palpitations.   Gastrointestinal: Negative for abdominal distention, nausea and vomiting.   Musculoskeletal: Positive for arthralgias and myalgias.   Skin: Positive for wound.   Neurological: Negative for tremors and seizures.   Psychiatric/Behavioral: Negative for agitation, confusion and hallucinations.       Objective   Vital Signs:  Temp: 97.8 F         Heart Rate: 92         Resp: 20          Blood Pressure: 109/59         Pulse Ox: 99%    Physical Exam:   Physical Exam  Vitals reviewed.   Constitutional:       Appearance: Normal appearance. He is not ill-appearing.   HENT:      Head: Normocephalic and atraumatic.      Mouth/Throat:      Mouth: Mucous membranes are moist.      Pharynx: Oropharynx is clear.   Eyes:      Extraocular Movements: Extraocular movements intact.      Pupils: Pupils are equal, round, and reactive to light.   Neck:      Vascular: No carotid bruit.   Cardiovascular:      Rate and Rhythm: Regular rhythm. Tachycardia present.      Pulses: Normal pulses.      Heart sounds: Normal heart sounds. No murmur heard.    No gallop.   Pulmonary:      Effort: Pulmonary effort is normal. No respiratory distress.      Breath  sounds: Normal breath sounds. No rales.   Abdominal:      General: Bowel sounds are normal. There is no distension.      Palpations: Abdomen is soft.      Tenderness: There is no guarding.   Musculoskeletal:         General: Normal range of motion.      Cervical back: Normal range of motion and neck supple.      Right lower leg: Edema present.      Left lower leg: Edema present.   Skin:     General: Skin is warm and dry.      Capillary Refill: Capillary refill takes less than 2 seconds.      Findings: Bruising present.      Comments: S/P L surgical repair Charcot foot with boot in place     Neurological:      General: No focal deficit present.      Mental Status: He is alert and oriented to person, place, and time.   Psychiatric:         Mood and Affect: Mood normal.         Behavior: Behavior normal.         Thought Content: Thought content normal.         Judgment: Judgment normal.       Results Review:       Results from last 7 days   Lab Units 05/19/23 0557 05/18/23  0658 05/15/23  0620 05/14/23  0547 05/13/23  0534   SODIUM mmol/L 137  --  138 137 139   POTASSIUM mmol/L 4.2  --  4.2 4.1 4.4   CHLORIDE mmol/L 102  --  102 101 103   CO2 mmol/L 24.0  --  24.0 24.0 26.0   BUN mg/dL 20  --  24* 24* 26*   CREATININE mg/dL 0.96 1.01 0.97 1.07 1.06   GLUCOSE mg/dL 126*  --  124* 167* 150*   CALCIUM mg/dL 8.7  --  9.1 9.1 9.2   BILIRUBIN mg/dL 0.7  --  0.8 0.7 0.8   ALK PHOS U/L 79  --  102 104 109   ALT (SGPT) U/L 20  --  35 38 40   AST (SGOT) U/L 18  --  27 24 27     Results from last 7 days   Lab Units 05/19/23 0557 05/15/23  0620 05/14/23  0547 05/13/23  0534   MAGNESIUM mg/dL 1.9 1.9 1.9 1.9     Results from last 7 days   Lab Units 05/19/23  0557 05/15/23  0620 05/14/23  0547 05/13/23  0533   WBC 10*3/mm3 4.21 4.90 4.95 4.53   HEMOGLOBIN g/dL 9.7* 11.8* 11.4* 11.4*   HEMATOCRIT % 30.7* 37.3* 36.9* 36.2*   PLATELETS 10*3/mm3 164 212 217 203     Lab Results   Component Value Date    CKTOTAL 119 04/23/2023    CKMB  2.16 04/23/2023    TROPONINI <0.012 11/19/2014    TROPONINT 52 (H) 04/25/2023     CO2   Date Value Ref Range Status   05/19/2023 24.0 22.0 - 29.0 mmol/L Final         Imaging Results (Most Recent)     None         No results found for: ACANTHNAEG, AFBCX, BPERTUSSISCX, BLOODCX  No results found for: BCIDPCR, CXREFLEX, CSFCX, CULTURETIS  No results found for: CULTURES, HSVCX, URCX  No results found for: EYECULTURE, GCCX, HSVCULTURE, LABHSV  No results found for: LEGIONELLA, MRSACX, MUMPSCX, MYCOPLASCX  No results found for: NOCARDIACX, STOOLCX  No results found for: THROATCX, UNSTIMCULT, URINECX, CULTURE, VZVCULTUR  No results found for: VIRALCULTU, WOUNDCX  Medication Reviewed and Will Continue Unless Documented in Plan:   !Vancomycin Level Draw Needed, , Does not apply, Once  ascorbic acid, 1,000 mg, Oral, Nightly  cetirizine, 10 mg, Oral, Daily  vitamin D3, 5,000 Units, Oral, Nightly  docusate sodium, 200 mg, Oral, BID  dronedarone, 400 mg, Oral, Daily With Dinner  fluticasone, 2 spray, Each Nare, Daily  fondaparinux, 10 mg, Subcutaneous, Q24H  glipizide, 10 mg, Oral, QAM AC  Insulin Aspart, 2-12 Units, Subcutaneous, 4x Daily With Meals & Nightly  magnesium oxide, 400 mg, Oral, BID  metoprolol succinate XL, 25 mg, Oral, BID  senna-docusate sodium, 2 tablet, Oral, BID  sodium bicarbonate, 650 mg, Oral, TID  tamsulosin, 0.4 mg, Oral, BID  vancomycin, 1,250 mg, Intravenous, Q12H      Pharmacy to dose vancomycin,       •  acetaminophen  •  bisacodyl  •  calcium carbonate  •  dextrose  •  hydrALAZINE  •  hydrOXYzine pamoate  •  muscle rub  •  ondansetron  •  oxyCODONE-acetaminophen  •  Pharmacy to dose vancomycin  •  simethicone  •  sodium chloride  •  traZODone  Pharmacy to dose vancomycin,        Dietary Orders (From admission, onward)     Start     Ordered    05/19/23 0800  DIET MESSAGE 1 pkg beneprotein powder in the hot water for hot tea at breakfast  Daily With Breakfast      Comments: 1 pkg beneprotein powder in  the hot water for hot tea at breakfast    05/18/23 1438    05/18/23 1800  DIET MESSAGE 1 pkg beneprotein powder in his ice tea for dinner  Daily With Dinner      Comments: 1 pkg beneprotein powder in his ice tea for dinner    05/18/23 1438    05/18/23 1800  DIET MESSAGE PT DISLIKES MILK>>>>> No coffee... No soup No orange juice/fruit juices.  Daily With Breakfast, Lunch & Dinner      Comments: PT DISLIKES MILK>>>>>  No coffee...  No soup  No orange juice/fruit juices.    05/18/23 1439    05/15/23 1824  Diet: Regular/House Diet, Diabetic Diets; Consistent Carbohydrate; Texture: Regular Texture (IDDSI 7); Fluid Consistency: Thin (IDDSI 0)  Diet Effective Now        References:    Diet Order [place]   Question Answer Comment   Diets: Regular/House Diet    Diets: Diabetic Diets    Diabetic Diet: Consistent Carbohydrate    Texture: Regular Texture (IDDSI 7)    Fluid Consistency: Thin (IDDSI 0)        05/15/23 1827              History, physical exam, assessment and plan may have been partly or fully copied from before, but  changes made to the copied record to reflect care on the date of service. Part of the lab and imaging  reports auto populated and corrected. Some of this note may be an electronic transcription of spoken  language to printed text. This may permit erroneous, or at times, nonsensical words or phrases to be  inadvertently transcribed. Although I have reviewed the note for such errors, some may still exist.      This document has been electronically signed by Charlie Persaud MD on May 19, 2023 15:39 CDT

## 2023-05-19 NOTE — OP NOTE
Date of Procedure: 05/15/2023     SURGEON: Marco A Thompson DPM      Assistant: Nedra Mcrae MA was responsible for performing the following activities: Retraction, Suction, Irrigation and Placing Dressing and their skilled assistance was necessary for the success of this case.      PREOPERATIVE DIAGNOSIS: Chronic left foot and ankle     POSTOPERATIVE DIAGNOSIS: Same as preop     PROCEDURES PERFORMED:   1.  Removal of external fixator  2.  Talocalcaneal arthrodesis     ANESTHESIA: General     HEMOSTASIS: Thigh tourniquet     ESTIMATED BLOOD LOSS: 100 mL.     SPECIMEN: none     MATERIALS: [place] valor nail     COMPLICATIONS: None.      CONDITION: Stable.        INDICATIONS FOR PROCEDURE: This is a patient of mine who has external fixator in place during the initial stage of this Charcot reconstruction.  The agrees to undergo the surgical intervention at this time. Consent is signed and documented in the chart. History and physical exam were reviewed prior to patient being taken to the operating room and n.p.o. status was confirmed. No guarantees were given or implied regarding the outcome of this treatment.      DESCRIPTION OF PROCEDURE: After mild sedation was administered by the anesthesia team in the preoperative holding area the patient was transported to the operating room and placed in a supine position.  General anesthesia was then administered and the left lower extremity was prepped and draped in usual sterile technique and a timeout was performed to confirm the correct patient, correct site, and correct procedure.      Attention was then directed to the left lower extremity where external fixator was removed.  Half pin sites and pin sites were flushed and closed with staples.  Incision was made on the plantar aspect of the foot.  Sharp and blunt dissection carried down to the plantar calcaneus.  Guidewire inserted through the calcaneus into the distal tibia.  Intraoperative fluoroscopy confirms  proper wire placement.  Next the site for the nail was reamed and a size 11 [place] nail was inserted.  The right arm used to insert screws through the nail to hold in place.  Again intraoperative fluoroscopy used throughout the entire process to ensure that the nail was properly seated and the screws were properly inserted through the bone and nail.  Pathology is obtained.  Incision site was flushed and closed in layers with nylon and Monocryl.  Tourniquet deflated.  Sterile dressing applied.  Patient tolerated procedure and anesthesia well was transported to the PACU with vital signs stable neurovascular status intact operationally.    Plan to discharge patient back to the LTAC once stable per anesthesia.  Nonweightbearing left lower extremity.  No further surgical intervention planned at this time.            This document has been electronically signed by Marco A Thompson DPM on May 19, 2023 12:00 CDT

## 2023-05-19 NOTE — PROGRESS NOTES
Pharmacokinetics by Pharmacy - Vancomycin    Emre Lisa is a 63 y.o. male receiving vancomycin day 35 for bone and/or joint infection    Objective:  [Ht: 182.9 cm; Wt: 170 kg]     WBC   Date Value Ref Range Status   05/19/2023 4.21 3.40 - 10.80 10*3/mm3 Final    No results found for: CRP, LACTATE   Temp Readings from Last 1 Encounters:   05/15/23 97.9 °F (36.6 °C) (Temporal)     Estimated Creatinine Clearance: 131.4 mL/min (by C-G formula based on SCr of 0.96 mg/dL).   Creatinine   Date Value Ref Range Status   05/19/2023 0.96 0.76 - 1.27 mg/dL Final   05/18/2023 1.01 0.76 - 1.27 mg/dL Final       Vancomycin Peak   Date Value Ref Range Status   05/17/2023 28.90 20.00 - 40.00 mcg/mL Final     Vancomycin Trough   Date Value Ref Range Status   05/18/2023 15.60 5.00 - 20.00 mcg/mL Final       Culture Results:  Microbiology Results (last 10 days)     ** No results found for the last 240 hours. **        No results found for: RESPCX    Assessment:    WBC 4.21, WNL   Scr 0.96 WNL and stable  No new cultures  Levels WNL on 5/18/23    Vancomycin trough and AUC goals 10-20 and 400-600, respectively.    Plan:  1. Continue vancomycin 1250mg IV Q12H. Consult ends 5/26/23.  2. Will order levels when clinically indicated.  3. Pharmacy will monitor renal function and adjust dose accordingly.      Eloy Mendoza [facility]   05/19/23 09:49 CDT

## 2023-05-20 ENCOUNTER — OUTSIDE FACILITY SERVICE (OUTPATIENT)
Dept: PULMONOLOGY | Facility: CLINIC | Age: 63
End: 2023-05-20
Payer: MEDICARE

## 2023-05-20 LAB
GLUCOSE BLDC GLUCOMTR-MCNC: 120 MG/DL (ref 70–130)
GLUCOSE BLDC GLUCOMTR-MCNC: 132 MG/DL (ref 70–130)
GLUCOSE BLDC GLUCOMTR-MCNC: 132 MG/DL (ref 70–130)
GLUCOSE BLDC GLUCOMTR-MCNC: 138 MG/DL (ref 70–130)
GLUCOSE BLDC GLUCOMTR-MCNC: 140 MG/DL (ref 70–130)
GLUCOSE BLDC GLUCOMTR-MCNC: 141 MG/DL (ref 70–130)
GLUCOSE BLDC GLUCOMTR-MCNC: 144 MG/DL (ref 70–130)
GLUCOSE BLDC GLUCOMTR-MCNC: 155 MG/DL (ref 70–130)
GLUCOSE BLDC GLUCOMTR-MCNC: 186 MG/DL (ref 70–130)

## 2023-05-20 PROCEDURE — 82948 REAGENT STRIP/BLOOD GLUCOSE: CPT

## 2023-05-20 PROCEDURE — 25010000002 FONDAPARINUX PER 0.5 MG

## 2023-05-20 PROCEDURE — 25010000002 VANCOMYCIN 10 G RECONSTITUTED SOLUTION: Performed by: INTERNAL MEDICINE

## 2023-05-20 NOTE — PROGRESS NOTES
Pharmacokinetics by Pharmacy - Vancomycin    Emre Lisa is a 63 y.o. male receiving vancomycin 1250 mg IV q12hr (day 36) for bone and/or joint infection.    Objective:    Temp Readings from Last 1 Encounters:   05/15/23 97.9 °F (36.6 °C) (Temporal)     WBC   Date Value Ref Range Status   05/19/2023 4.21 3.40 - 10.80 10*3/mm3 Final    No results found for: CRP, LACTATE   Creatinine   Date Value Ref Range Status   05/19/2023 0.96 0.76 - 1.27 mg/dL Final   05/18/2023 1.01 0.76 - 1.27 mg/dL Final       Vancomycin Peak   Date Value Ref Range Status   05/17/2023 28.90 20.00 - 40.00 mcg/mL Final     Vancomycin Trough   Date Value Ref Range Status   05/18/2023 15.60 5.00 - 20.00 mcg/mL Final       Culture Results:  Microbiology Results (last 10 days)     ** No results found for the last 240 hours. **        No results found for: RESPCX    [Ht:  ; Wt:  ]   There is no height or weight on file to calculate BMI.  Ideal body weight: 77.6 kg (171 lb 1.2 oz)  Adjusted ideal body weight: 115 kg (252 lb 14.8 oz)      Assessment:  • WBCs WNL  • Creatinine WNL.   • Vancomycin WNL on 5/18  • AUC and trough goals are 400-600 and 10-20 mcg/mL, respectively.     Plan:  1. Continue Vancomycin 1250 mg IV q12hrs  2. Vancomycin levels when clinically indicated. Consulted until 5/26.  3. Pharmacy will monitor renal function and adjust dose accordingly.    Thank you for this consult.     Estiven Cannon, [facility]   05/20/23 08:43 CDT

## 2023-05-20 NOTE — ACP (ADVANCE CARE PLANNING)
[facility] @ [facility]  INPATIENT PROGRESS NOTE    PATIENT NAME: Emre Lisa      PHYSICIAN: Charlie Persaud MD  : 1960        MRN: [id number]  Patient Care Team:  Jamaal Bravo MD as PCP - Marco A Chauhan DPM as Consulting Physician (Podiatry)    Chief Complaint:  Continued antibiotic therapy and medical management  History of Present Illness: Patient is a 62 y/o male with a pmh of type 2 DM, hypertension, CAD, and neuropathy.  He recently underwent surgical repair of Charcot deformity with external fixator stabilization to his left foot related to cellulitis.  He tolerated the surgical procedure well.  He will need IV antibiotic therapy through 23 due to bone/joint infection.  He remains non-weight bearing of left lower extremity.  He tells me the pain to his left foot remains controlled, however, he does have a torn left rotator cuff and arthritis so his hip and shoulder are the source of his pain.  He tells me he is doing well and he has no other complaints or concerns.  I have reviewed the patients most recent labs and diagnostics independently.  Glucose 124, BUN 15, creatinine 0.89, sodium 138, potassium 4.2, chloride 105, co2 24, wbc 5.25, hgb 9.9, hct 30.1, platelets 156.       Assessment      Cellulitis of left foot  Charcot's joint of left foot  Type 2 DM  Arthritis  A-fib  Hypertension  Rheumatoid arthritis  DVT & GI prophylaxis  External fixator removal (05/15/2023)     DVT Prophylaxis: Heparin  GI Prophylaxis: Protonix  Code Status: Full    Plan     -Stable at time of exam.  -Participating in therapy.  -Pain management in place and effective.  -Anticoagulation continues.   -Has boot in place on left foot.   -Patient remains clinically stable and improved.  -DVT and GI prophylaxis in place.  -We'll continue monitoring patient in hospital setting and treat patient as course dictates.  -Please review orders for detailed  plan of care.  -For Acute and Chronic medical condition we will continue medications described below in medication section which have been reviewed and we will continue unless changed in plan of care.  -Laboratory and diagnostic studies have been independently reviewed and the reports are reviewed as documented below.      Subjective   Interval History:   Patient Complaints:   Patient seen and examined. He is resting comfortably in bed watching tv. No overnight events reported from nursing staff. No needs at this time.   History taken from: Patient, patient's chart.    Review of Systems:    Review of Systems   Constitutional: Positive for activity change. Negative for chills and fever.   HENT: Negative for postnasal drip and tinnitus.    Respiratory: Negative for apnea.    Cardiovascular: Negative for chest pain and palpitations.   Gastrointestinal: Negative for abdominal distention, nausea and vomiting.   Musculoskeletal: Positive for arthralgias and myalgias.   Skin: Positive for wound.   Neurological: Negative for tremors and seizures.   Psychiatric/Behavioral: Negative for agitation, confusion and hallucinations.       Objective   Vital Signs:  Temp: 97.8 F         Heart Rate: 79         Resp: 18          Blood Pressure: 133/81         Pulse Ox: 96 %    Physical Exam:   Physical Exam  Vitals reviewed.   Constitutional:       Appearance: Normal appearance. He is not ill-appearing.   HENT:      Head: Normocephalic and atraumatic.      Mouth/Throat:      Mouth: Mucous membranes are moist.      Pharynx: Oropharynx is clear.   Eyes:      Extraocular Movements: Extraocular movements intact.      Pupils: Pupils are equal, round, and reactive to light.   Neck:      Vascular: No carotid bruit.   Cardiovascular:      Rate and Rhythm: Normal rate and regular rhythm.      Pulses: Normal pulses.      Heart sounds: Normal heart sounds. No murmur heard.    No gallop.   Pulmonary:      Effort: Pulmonary effort is normal. No  respiratory distress.      Breath sounds: Normal breath sounds. No rales.   Abdominal:      General: Bowel sounds are normal. There is no distension.      Palpations: Abdomen is soft.      Tenderness: There is no guarding.   Musculoskeletal:         General: Normal range of motion.      Cervical back: Normal range of motion and neck supple.      Right lower leg: Edema present.      Left lower leg: Edema present.   Skin:     General: Skin is warm and dry.      Capillary Refill: Capillary refill takes less than 2 seconds.      Findings: Bruising present.      Comments: S/P L surgical repair Charcot foot with boot in place     Neurological:      General: No focal deficit present.      Mental Status: He is alert and oriented to person, place, and time.   Psychiatric:         Mood and Affect: Mood normal.         Behavior: Behavior normal.         Thought Content: Thought content normal.         Judgment: Judgment normal.       Results Review:       Results from last 7 days   Lab Units 05/19/23  0557 05/18/23  0658 05/15/23  0620 05/14/23  0547   SODIUM mmol/L 137  --  138 137   POTASSIUM mmol/L 4.2  --  4.2 4.1   CHLORIDE mmol/L 102  --  102 101   CO2 mmol/L 24.0  --  24.0 24.0   BUN mg/dL 20  --  24* 24*   CREATININE mg/dL 0.96 1.01 0.97 1.07   GLUCOSE mg/dL 126*  --  124* 167*   CALCIUM mg/dL 8.7  --  9.1 9.1   BILIRUBIN mg/dL 0.7  --  0.8 0.7   ALK PHOS U/L 79  --  102 104   ALT (SGPT) U/L 20  --  35 38   AST (SGOT) U/L 18  --  27 24     Results from last 7 days   Lab Units 05/19/23  0557 05/15/23  0620 05/14/23  0547   MAGNESIUM mg/dL 1.9 1.9 1.9     Results from last 7 days   Lab Units 05/19/23  0557 05/15/23  0620 05/14/23  0547   WBC 10*3/mm3 4.21 4.90 4.95   HEMOGLOBIN g/dL 9.7* 11.8* 11.4*   HEMATOCRIT % 30.7* 37.3* 36.9*   PLATELETS 10*3/mm3 164 212 217     Lab Results   Component Value Date    CKTOTAL 119 04/23/2023    CKMB 2.16 04/23/2023    TROPONINI <0.012 11/19/2014    TROPONINT 52 (H) 04/25/2023     CO2    Date Value Ref Range Status   05/19/2023 24.0 22.0 - 29.0 mmol/L Final         Imaging Results (Most Recent)     None         No results found for: ACANTHNAEG, AFBCX, BPERTUSSISCX, BLOODCX  No results found for: BCIDPCR, CXREFLEX, CSFCX, CULTURETIS  No results found for: CULTURES, HSVCX, URCX  No results found for: EYECULTURE, GCCX, HSVCULTURE, LABHSV  No results found for: LEGIONELLA, MRSACX, MUMPSCX, MYCOPLASCX  No results found for: NOCARDIACX, STOOLCX  No results found for: THROATCX, UNSTIMCULT, URINECX, CULTURE, VZVCULTUR  No results found for: VIRALCULTU, WOUNDCX  Medication Reviewed and Will Continue Unless Documented in Plan:   ascorbic acid, 1,000 mg, Oral, Nightly  cetirizine, 10 mg, Oral, Daily  vitamin D3, 5,000 Units, Oral, Nightly  docusate sodium, 200 mg, Oral, BID  dronedarone, 400 mg, Oral, Daily With Dinner  fluticasone, 2 spray, Each Nare, Daily  fondaparinux, 10 mg, Subcutaneous, Q24H  glipizide, 10 mg, Oral, QAM AC  Insulin Aspart, 2-12 Units, Subcutaneous, 4x Daily With Meals & Nightly  magnesium oxide, 400 mg, Oral, BID  metoprolol succinate XL, 25 mg, Oral, BID  senna-docusate sodium, 2 tablet, Oral, BID  sodium bicarbonate, 650 mg, Oral, TID  tamsulosin, 0.4 mg, Oral, BID  vancomycin, 1,250 mg, Intravenous, Q12H      Pharmacy to dose vancomycin,       •  acetaminophen  •  bisacodyl  •  calcium carbonate  •  dextrose  •  hydrALAZINE  •  hydrOXYzine pamoate  •  muscle rub  •  ondansetron  •  oxyCODONE-acetaminophen  •  Pharmacy to dose vancomycin  •  simethicone  •  sodium chloride  •  traZODone  Pharmacy to dose vancomycin,        Dietary Orders (From admission, onward)     Start     Ordered    05/19/23 0800  DIET MESSAGE 1 pkg beneprotein powder in the hot water for hot tea at breakfast  Daily With Breakfast      Comments: 1 pkg beneprotein powder in the hot water for hot tea at breakfast    05/18/23 1438    05/18/23 1800  DIET MESSAGE 1 pkg beneprotein powder in his ice tea for dinner   Daily With Dinner      Comments: 1 rj beneprotein powder in his ice tea for dinner    05/18/23 1438    05/18/23 1800  DIET MESSAGE PT DISLIKES MILK>>>>> No coffee... No soup No orange juice/fruit juices.  Daily With Breakfast, Lunch & Dinner      Comments: PT DISLIKES MILK>>>>>  No coffee...  No soup  No orange juice/fruit juices.    05/18/23 1439    05/15/23 1824  Diet: Regular/House Diet, Diabetic Diets; Consistent Carbohydrate; Texture: Regular Texture (IDDSI 7); Fluid Consistency: Thin (IDDSI 0)  Diet Effective Now        References:    Diet Order Crosswalk   Question Answer Comment   Diets: Regular/House Diet    Diets: Diabetic Diets    Diabetic Diet: Consistent Carbohydrate    Texture: Regular Texture (IDDSI 7)    Fluid Consistency: Thin (IDDSI 0)        05/15/23 1827              History, physical exam, assessment and plan may have been partly or fully copied from before, but  changes made to the copied record to reflect care on the date of service. Part of the lab and imaging  reports auto populated and corrected. Some of this note may be an electronic transcription of spoken  language to printed text. This may permit erroneous, or at times, nonsensical words or phrases to be  inadvertently transcribed. Although I have reviewed the note for such errors, some may still exist.      This document has been electronically signed by Charlie Persaud MD on May 20, 2023 09:41 CDT

## 2023-05-21 ENCOUNTER — OUTSIDE FACILITY SERVICE (OUTPATIENT)
Dept: PULMONOLOGY | Facility: CLINIC | Age: 63
End: 2023-05-21
Payer: MEDICARE

## 2023-05-21 LAB
GLUCOSE BLDC GLUCOMTR-MCNC: 132 MG/DL (ref 70–130)
GLUCOSE BLDC GLUCOMTR-MCNC: 151 MG/DL (ref 70–130)
GLUCOSE BLDC GLUCOMTR-MCNC: 158 MG/DL (ref 70–130)
GLUCOSE BLDC GLUCOMTR-MCNC: 174 MG/DL (ref 70–130)

## 2023-05-21 PROCEDURE — 82948 REAGENT STRIP/BLOOD GLUCOSE: CPT

## 2023-05-21 PROCEDURE — 25010000002 FONDAPARINUX PER 0.5 MG

## 2023-05-21 PROCEDURE — 25010000002 VANCOMYCIN 10 G RECONSTITUTED SOLUTION: Performed by: INTERNAL MEDICINE

## 2023-05-21 NOTE — ACP (ADVANCE CARE PLANNING)
[facility] @ [facility]  INPATIENT PROGRESS NOTE    PATIENT NAME: Emre Lisa      PHYSICIAN: Charlie Persaud MD  : 1960        MRN: [id number]  Patient Care Team:  Jamaal Bravo MD as PCP - Marco A Chauhan DPM as Consulting Physician (Podiatry)    Chief Complaint:  Continued antibiotic therapy and medical management  History of Present Illness: Patient is a 64 y/o male with a pmh of type 2 DM, hypertension, CAD, and neuropathy.  He recently underwent surgical repair of Charcot deformity with external fixator stabilization to his left foot related to cellulitis.  He tolerated the surgical procedure well.  He will need IV antibiotic therapy through 23 due to bone/joint infection.  He remains non-weight bearing of left lower extremity.  He tells me the pain to his left foot remains controlled, however, he does have a torn left rotator cuff and arthritis so his hip and shoulder are the source of his pain.  He tells me he is doing well and he has no other complaints or concerns.  I have reviewed the patients most recent labs and diagnostics independently.  Glucose 124, BUN 15, creatinine 0.89, sodium 138, potassium 4.2, chloride 105, co2 24, wbc 5.25, hgb 9.9, hct 30.1, platelets 156.       Assessment      Cellulitis of left foot  Charcot's joint of left foot  Type 2 DM  Arthritis  A-fib  Hypertension  Rheumatoid arthritis  DVT & GI prophylaxis  External fixator removal (05/15/2023)     DVT Prophylaxis: Heparin  GI Prophylaxis: Protonix  Code Status: Full    Plan     -Stable at time of exam.  -Participating in therapy.  -Pain management in place and effective.  -Anticoagulation continues.   -Has boot in place on left foot.   -Patient remains clinically stable and improved.  -DVT and GI prophylaxis in place.  -We'll continue monitoring patient in hospital setting and treat patient as course dictates.  -Please review orders for detailed  plan of care.  -For Acute and Chronic medical condition we will continue medications described below in medication section which have been reviewed and we will continue unless changed in plan of care.  -Laboratory and diagnostic studies have been independently reviewed and the reports are reviewed as documented below.      Subjective   Interval History:   Patient Complaints:   Patient seen and examined. He is resting comfortably at this time. No overnight events reported from nursing staff. Patient has no needs at this time.  History taken from: Patient, patient's chart.    Review of Systems:    Review of Systems   Constitutional: Positive for activity change. Negative for chills and fever.   HENT: Negative for postnasal drip and tinnitus.    Respiratory: Negative for apnea.    Cardiovascular: Negative for chest pain and palpitations.   Gastrointestinal: Negative for abdominal distention, nausea and vomiting.   Musculoskeletal: Positive for arthralgias and myalgias.   Skin: Positive for wound.   Neurological: Negative for tremors and seizures.   Psychiatric/Behavioral: Negative for agitation, confusion and hallucinations.       Objective   Vital Signs:  Temp: 97.0 F         Heart Rate: 81         Resp: 18          Blood Pressure: 129/67         Pulse Ox: 97 %    Physical Exam:   Physical Exam  Vitals reviewed.   Constitutional:       Appearance: Normal appearance. He is not ill-appearing.   HENT:      Head: Normocephalic and atraumatic.      Mouth/Throat:      Mouth: Mucous membranes are moist.      Pharynx: Oropharynx is clear.   Eyes:      Extraocular Movements: Extraocular movements intact.      Pupils: Pupils are equal, round, and reactive to light.   Neck:      Vascular: No carotid bruit.   Cardiovascular:      Rate and Rhythm: Normal rate and regular rhythm.      Pulses: Normal pulses.      Heart sounds: Normal heart sounds. No murmur heard.    No gallop.   Pulmonary:      Effort: Pulmonary effort is normal.  No respiratory distress.      Breath sounds: Normal breath sounds. No rales.   Abdominal:      General: Bowel sounds are normal. There is no distension.      Palpations: Abdomen is soft.      Tenderness: There is no guarding.   Musculoskeletal:         General: Normal range of motion.      Cervical back: Normal range of motion and neck supple.      Right lower leg: Edema present.      Left lower leg: Edema present.   Skin:     General: Skin is warm and dry.      Capillary Refill: Capillary refill takes less than 2 seconds.      Findings: Bruising present.      Comments: S/P L surgical repair Charcot foot with boot in place     Neurological:      General: No focal deficit present.      Mental Status: He is alert and oriented to person, place, and time.   Psychiatric:         Mood and Affect: Mood normal.         Behavior: Behavior normal.         Thought Content: Thought content normal.         Judgment: Judgment normal.       Results Review:       Results from last 7 days   Lab Units 05/19/23  0557 05/18/23  0658 05/15/23  0620   SODIUM mmol/L 137  --  138   POTASSIUM mmol/L 4.2  --  4.2   CHLORIDE mmol/L 102  --  102   CO2 mmol/L 24.0  --  24.0   BUN mg/dL 20  --  24*   CREATININE mg/dL 0.96 1.01 0.97   GLUCOSE mg/dL 126*  --  124*   CALCIUM mg/dL 8.7  --  9.1   BILIRUBIN mg/dL 0.7  --  0.8   ALK PHOS U/L 79  --  102   ALT (SGPT) U/L 20  --  35   AST (SGOT) U/L 18  --  27     Results from last 7 days   Lab Units 05/19/23  0557 05/15/23  0620   MAGNESIUM mg/dL 1.9 1.9     Results from last 7 days   Lab Units 05/19/23  0557 05/15/23  0620   WBC 10*3/mm3 4.21 4.90   HEMOGLOBIN g/dL 9.7* 11.8*   HEMATOCRIT % 30.7* 37.3*   PLATELETS 10*3/mm3 164 212     Lab Results   Component Value Date    CKTOTAL 119 04/23/2023    CKMB 2.16 04/23/2023    TROPONINI <0.012 11/19/2014    TROPONINT 52 (H) 04/25/2023     CO2   Date Value Ref Range Status   05/19/2023 24.0 22.0 - 29.0 mmol/L Final         Imaging Results (Most Recent)      None         No results found for: ACANTHNAEG, AFBCX, BPERTUSSISCX, BLOODCX  No results found for: BCIDPCR, CXREFLEX, CSFCX, CULTURETIS  No results found for: CULTURES, HSVCX, URCX  No results found for: EYECULTURE, GCCX, HSVCULTURE, LABHSV  No results found for: LEGIONELLA, MRSACX, MUMPSCX, MYCOPLASCX  No results found for: NOCARDIACX, STOOLCX  No results found for: THROATCX, UNSTIMCULT, URINECX, CULTURE, VZVCULTUR  No results found for: VIRALCULTU, WOUNDCX  Medication Reviewed and Will Continue Unless Documented in Plan:   ascorbic acid, 1,000 mg, Oral, Nightly  cetirizine, 10 mg, Oral, Daily  vitamin D3, 5,000 Units, Oral, Nightly  docusate sodium, 200 mg, Oral, BID  dronedarone, 400 mg, Oral, Daily With Dinner  fluticasone, 2 spray, Each Nare, Daily  fondaparinux, 10 mg, Subcutaneous, Q24H  glipizide, 10 mg, Oral, QAM AC  Insulin Aspart, 2-12 Units, Subcutaneous, 4x Daily With Meals & Nightly  magnesium oxide, 400 mg, Oral, BID  metoprolol succinate XL, 25 mg, Oral, BID  senna-docusate sodium, 2 tablet, Oral, BID  sodium bicarbonate, 650 mg, Oral, TID  tamsulosin, 0.4 mg, Oral, BID  vancomycin, 1,250 mg, Intravenous, Q12H      Pharmacy to dose vancomycin,       •  acetaminophen  •  bisacodyl  •  calcium carbonate  •  dextrose  •  hydrALAZINE  •  hydrOXYzine pamoate  •  muscle rub  •  ondansetron  •  oxyCODONE-acetaminophen  •  Pharmacy to dose vancomycin  •  simethicone  •  sodium chloride  •  traZODone  Pharmacy to dose vancomycin,        Dietary Orders (From admission, onward)     Start     Ordered    05/19/23 0800  DIET MESSAGE 1 pkg beneprotein powder in the hot water for hot tea at breakfast  Daily With Breakfast      Comments: 1 pkg beneprotein powder in the hot water for hot tea at breakfast    05/18/23 1438    05/18/23 1800  DIET MESSAGE 1 pkg beneprotein powder in his ice tea for dinner  Daily With Dinner      Comments: 1 pkg beneprotein powder in his ice tea for dinner    05/18/23 1434    05/18/23 1800   DIET MESSAGE PT DISLIKES MILK>>>>> No coffee... No soup No orange juice/fruit juices.  Daily With Breakfast, Lunch & Dinner      Comments: PT DISLIKES MILK>>>>>  No coffee...  No soup  No orange juice/fruit juices.    05/18/23 1439    05/15/23 1824  Diet: Regular/House Diet, Diabetic Diets; Consistent Carbohydrate; Texture: Regular Texture (IDDSI 7); Fluid Consistency: Thin (IDDSI 0)  Diet Effective Now        References:    Diet Order Crosswalk   Question Answer Comment   Diets: Regular/House Diet    Diets: Diabetic Diets    Diabetic Diet: Consistent Carbohydrate    Texture: Regular Texture (IDDSI 7)    Fluid Consistency: Thin (IDDSI 0)        05/15/23 1827              History, physical exam, assessment and plan may have been partly or fully copied from before, but  changes made to the copied record to reflect care on the date of service. Part of the lab and imaging  reports auto populated and corrected. Some of this note may be an electronic transcription of spoken  language to printed text. This may permit erroneous, or at times, nonsensical words or phrases to be  inadvertently transcribed. Although I have reviewed the note for such errors, some may still exist.      This document has been electronically signed by Charlie Persaud MD on May 21, 2023 10:10 CDT

## 2023-05-21 NOTE — PROGRESS NOTES
Pharmacokinetics by Pharmacy - Vancomycin    Emre Lisa is a 63 y.o. male receiving vancomycin 1250 mg IV q12hr (day 37) for bone and/or joint infection.    Objective:    Temp Readings from Last 1 Encounters:   05/15/23 97.9 °F (36.6 °C) (Temporal)     WBC   Date Value Ref Range Status   05/19/2023 4.21 3.40 - 10.80 10*3/mm3 Final    No results found for: CRP, LACTATE   Creatinine   Date Value Ref Range Status   05/19/2023 0.96 0.76 - 1.27 mg/dL Final       No results found for: VANCOPEAK, VANCOTROUGH, VANCORANDOM    Culture Results:  Microbiology Results (last 10 days)     ** No results found for the last 240 hours. **        No results found for: RESPCX    [Ht:  ; Wt:  ]   There is no height or weight on file to calculate BMI.  Ideal body weight: 77.6 kg (171 lb 1.2 oz)  Adjusted ideal body weight: 115 kg (252 lb 14.8 oz)      Assessment:  • WBCs WNL  • Creatinine WNL.   • Vancomycin WNL on 5/18  • AUC and trough goals are 400-600 and 10-20 mcg/mL, respectively.     Plan:  1. Continue Vancomycin 1250 mg IV q12hrs  2. Vancomycin levels when clinically indicated. Consulted until 5/26.  3. Pharmacy will monitor renal function and adjust dose accordingly.    Thank you for this consult.     Estiven Cannon [facility]   05/21/23 09:22 CDT

## 2023-05-22 ENCOUNTER — OUTSIDE FACILITY SERVICE (OUTPATIENT)
Dept: PULMONOLOGY | Facility: CLINIC | Age: 63
End: 2023-05-22
Payer: MEDICARE

## 2023-05-22 LAB
ALBUMIN SERPL-MCNC: 3.9 G/DL (ref 3.5–5.2)
ALBUMIN/GLOB SERPL: 1.2 G/DL
ALP SERPL-CCNC: 88 U/L (ref 39–117)
ALT SERPL W P-5'-P-CCNC: 25 U/L (ref 1–41)
ANION GAP SERPL CALCULATED.3IONS-SCNC: 9 MMOL/L (ref 5–15)
AST SERPL-CCNC: 22 U/L (ref 1–40)
BILIRUB SERPL-MCNC: 0.6 MG/DL (ref 0–1.2)
BUN SERPL-MCNC: 18 MG/DL (ref 8–23)
BUN/CREAT SERPL: 18.8 (ref 7–25)
CALCIUM SPEC-SCNC: 9.1 MG/DL (ref 8.6–10.5)
CHLORIDE SERPL-SCNC: 100 MMOL/L (ref 98–107)
CO2 SERPL-SCNC: 27 MMOL/L (ref 22–29)
CREAT SERPL-MCNC: 0.96 MG/DL (ref 0.76–1.27)
DEPRECATED RDW RBC AUTO: 46.3 FL (ref 37–54)
EGFRCR SERPLBLD CKD-EPI 2021: 89.9 ML/MIN/1.73
ERYTHROCYTE [DISTWIDTH] IN BLOOD BY AUTOMATED COUNT: 14.5 % (ref 12.3–15.4)
GLOBULIN UR ELPH-MCNC: 3.2 GM/DL
GLUCOSE BLDC GLUCOMTR-MCNC: 157 MG/DL (ref 70–130)
GLUCOSE BLDC GLUCOMTR-MCNC: 169 MG/DL (ref 70–130)
GLUCOSE BLDC GLUCOMTR-MCNC: 184 MG/DL (ref 70–130)
GLUCOSE BLDC GLUCOMTR-MCNC: 192 MG/DL (ref 70–130)
GLUCOSE SERPL-MCNC: 153 MG/DL (ref 65–99)
HCT VFR BLD AUTO: 34.5 % (ref 37.5–51)
HGB BLD-MCNC: 10.8 G/DL (ref 13–17.7)
MAGNESIUM SERPL-MCNC: 2 MG/DL (ref 1.6–2.4)
MCH RBC QN AUTO: 27.7 PG (ref 26.6–33)
MCHC RBC AUTO-ENTMCNC: 31.3 G/DL (ref 31.5–35.7)
MCV RBC AUTO: 88.5 FL (ref 79–97)
PLATELET # BLD AUTO: 199 10*3/MM3 (ref 140–450)
PMV BLD AUTO: 10.3 FL (ref 6–12)
POTASSIUM SERPL-SCNC: 4.3 MMOL/L (ref 3.5–5.2)
PROT SERPL-MCNC: 7.1 G/DL (ref 6–8.5)
RBC # BLD AUTO: 3.9 10*6/MM3 (ref 4.14–5.8)
SODIUM SERPL-SCNC: 136 MMOL/L (ref 136–145)
WBC NRBC COR # BLD: 4.71 10*3/MM3 (ref 3.4–10.8)

## 2023-05-22 PROCEDURE — 83735 ASSAY OF MAGNESIUM: CPT | Performed by: INTERNAL MEDICINE

## 2023-05-22 PROCEDURE — 97530 THERAPEUTIC ACTIVITIES: CPT

## 2023-05-22 PROCEDURE — 82948 REAGENT STRIP/BLOOD GLUCOSE: CPT

## 2023-05-22 PROCEDURE — 85027 COMPLETE CBC AUTOMATED: CPT | Performed by: INTERNAL MEDICINE

## 2023-05-22 PROCEDURE — 25010000002 VANCOMYCIN 10 G RECONSTITUTED SOLUTION: Performed by: INTERNAL MEDICINE

## 2023-05-22 PROCEDURE — 80053 COMPREHEN METABOLIC PANEL: CPT | Performed by: INTERNAL MEDICINE

## 2023-05-22 PROCEDURE — 97110 THERAPEUTIC EXERCISES: CPT

## 2023-05-22 PROCEDURE — 97535 SELF CARE MNGMENT TRAINING: CPT

## 2023-05-22 NOTE — ACP (ADVANCE CARE PLANNING)
Reason for assessment:  Nutrition Follow up    Nutrition Diet/History:    -Typical Intake:  Pt reports that for the most part he continues to eat well.  He is tired of a lot of the menu items, especially at supper.  He does not like the drink that was added for healing.  He wants it discontinued.        -  Weight history:  CBW:  338.6  BMI of 46 indicating Morbid Obesity.      Labs:  Gluc 174/158/153    Meds:  Eliquis; VitC; VitD; Colace; Glucotrol; SSI;    Estimated Energy Needs:    Nutrition Prescription Order:  Dietary Orders (From admission, onward)     Start     Ordered    05/19/23 0800  DIET MESSAGE 1 pkg beneprotein powder in the hot water for hot tea at breakfast  Daily With Breakfast      Comments: 1 pkg beneprotein powder in the hot water for hot tea at breakfast    05/18/23 1438    05/18/23 1800  DIET MESSAGE 1 pkg beneprotein powder in his ice tea for dinner  Daily With Dinner      Comments: 1 pkg beneprotein powder in his ice tea for dinner    05/18/23 1438    05/18/23 1800  DIET MESSAGE PT DISLIKES MILK>>>>> No coffee... No soup No orange juice/fruit juices.  Daily With Breakfast, Lunch & Dinner      Comments: PT DISLIKES MILK>>>>>  No coffee...  No soup  No orange juice/fruit juices.    05/18/23 1439    05/15/23 1824  Diet: Regular/House Diet, Diabetic Diets; Consistent Carbohydrate; Texture: Regular Texture (IDDSI 7); Fluid Consistency: Thin (IDDSI 0)  Diet Effective Now        References:    Diet Order Crosswalk   Question Answer Comment   Diets: Regular/House Diet    Diets: Diabetic Diets    Diabetic Diet: Consistent Carbohydrate    Texture: Regular Texture (IDDSI 7)    Fluid Consistency: Thin (IDDSI 0)        05/15/23 1827                Evaluation of Nutrition Intake:  ~100% at most meals    Evaluation of Nutrition Assessment:  Excellent intake continues.  Wound care continues. Good blood sugar control.  He does not like the Beneprotein in his drinks and would like it discontinued.  RD will do  this and add Extra eggs at breakfast and extra meat at lunch and supper for wound healing.  BMI remains at 46 indicating Morbid obesity.  RD will continue to monitor POC and po intake.      Last BM:  5/21    Wounds:  Left ankle ORIF;     Nutrition Intervention:  Will discontinue Beneprotein  Pt provided with menu suggestions and alternatives.

## 2023-05-22 NOTE — PROGRESS NOTES
Pharmacokinetics by Pharmacy - Vancomycin    Emre Lisa is a 63 y.o. male  [Ht: 182.9 cm ; Wt: 170 kg ] is being treated for  bone/joint infection , day 38 of therapy.    Estimated Creatinine Clearance: 131.4 mL/min (by C-G formula based on SCr of 0.96 mg/dL).   Creatinine   Date Value Ref Range Status   05/22/2023 0.96 0.76 - 1.27 mg/dL Final   05/19/2023 0.96 0.76 - 1.27 mg/dL Final   05/18/2023 1.01 0.76 - 1.27 mg/dL Final   11/08/2022 1.0 0.7 - 1.3 mg/dL Final      Lab Results   Component Value Date    WBC 4.71 05/22/2023    WBC 4.21 05/19/2023    WBC 4.90 05/15/2023     C-Reactive Protein   Date Value Ref Range Status   04/13/2023 1.26 (H) 0.00 - 0.50 mg/dL Final   01/26/2022 <0.30 0.00 - 0.50 mg/dL Final   01/07/2022 19.20 (H) 0.00 - 0.50 mg/dL Final     Lactate   Date Value Ref Range Status   03/11/2016 1.5 0.5 - 2.0 mmol/L Final       Temp Readings from Last 1 Encounters:   05/15/23 97.9 °F (36.6 °C) (Temporal)      Lab Results   Component Value Date    VANCOPEAK 28.90 05/17/2023    VANCOTROUGH 15.60 05/18/2023    VANCORANDOM 38.90 04/28/2023         Culture Results:  Microbiology Results (last 10 days)       ** No results found for the last 240 hours. **            Current Vancomycin Dose:  1250 mg IVPB every 12 hours, day 38 of therapy.    Assessment/Plan:  Reviewed above labs and cultures.   Pt has been stable on current Vancomycin dose of 1250 mm IV every 12 hours,  WBC is 4.71, in goal range. Cr is 0.96, stable. Will continue current dose.  Pharmacy will continue to monitor renal function and adjust dose accordingly. Consult is through 5/26/23.    Sowmya Verduzco, PharmD   05/22/23 09:46 CDT

## 2023-05-22 NOTE — ACP (ADVANCE CARE PLANNING)
[facility] @ [facility]  INPATIENT PROGRESS NOTE    PATIENT NAME: Emre Lisa      PHYSICIAN: Charlie Persaud MD  : 1960        MRN: [id number]  Patient Care Team:  Jamaal Bravo MD as PCP - Marco A Chauhan DPM as Consulting Physician (Podiatry)    Chief Complaint:  Continued antibiotic therapy and medical management  History of Present Illness: Patient is a 64 y/o male with a pmh of type 2 DM, hypertension, CAD, and neuropathy.  He recently underwent surgical repair of Charcot deformity with external fixator stabilization to his left foot related to cellulitis.  He tolerated the surgical procedure well.  He will need IV antibiotic therapy through 23 due to bone/joint infection.  He remains non-weight bearing of left lower extremity.  He tells me the pain to his left foot remains controlled, however, he does have a torn left rotator cuff and arthritis so his hip and shoulder are the source of his pain.  He tells me he is doing well and he has no other complaints or concerns.  I have reviewed the patients most recent labs and diagnostics independently.  Glucose 124, BUN 15, creatinine 0.89, sodium 138, potassium 4.2, chloride 105, co2 24, wbc 5.25, hgb 9.9, hct 30.1, platelets 156.       Assessment      Cellulitis of left foot  Charcot's joint of left foot  Type 2 DM  Arthritis  A-fib  Hypertension  Rheumatoid arthritis  DVT & GI prophylaxis  External fixator removal (05/15/2023)     DVT Prophylaxis: Heparin  GI Prophylaxis: Protonix  Code Status: Full    Plan     -Stable at time of exam.  -Continue aggressive therapy  -We will follow podiatry recommendations  -We will continue to monitor labs.  -Pain management in place and effective.  -Anticoagulation continues.   -Has boot in place on left foot.   -Patient remains clinically stable and improved.  -DVT and GI prophylaxis in place.  -We'll continue monitoring patient in hospital  setting and treat patient as course dictates.  -Please review orders for detailed plan of care.  -For Acute and Chronic medical condition we will continue medications described below in medication section which have been reviewed and we will continue unless changed in plan of care.  -Laboratory and diagnostic studies have been independently reviewed and the reports are reviewed as documented below.      Subjective   Interval History:   Patient Complaints: Resting comfortably in bed.  Labs independently reviewed and stable.  No overnight events reported.   Patient seen and examined.   History taken from: Patient, patient's chart.    Review of Systems:    Review of Systems   Constitutional: Positive for activity change. Negative for chills and fever.   HENT: Negative for postnasal drip and tinnitus.    Respiratory: Negative for apnea.    Cardiovascular: Negative for chest pain and palpitations.   Gastrointestinal: Negative for abdominal distention, nausea and vomiting.   Musculoskeletal: Positive for arthralgias and myalgias.   Skin: Positive for wound.   Neurological: Negative for tremors and seizures.   Psychiatric/Behavioral: Negative for agitation, confusion and hallucinations.       Objective   Vital Signs:  Temp: 97 F         Heart Rate: 77         Resp: 18          Blood Pressure: 126/78         Pulse Ox: 93%    Physical Exam:   Physical Exam  Vitals reviewed.   Constitutional:       Appearance: Normal appearance. He is not ill-appearing.   HENT:      Head: Normocephalic and atraumatic.      Mouth/Throat:      Mouth: Mucous membranes are moist.      Pharynx: Oropharynx is clear.   Eyes:      Extraocular Movements: Extraocular movements intact.      Pupils: Pupils are equal, round, and reactive to light.   Neck:      Vascular: No carotid bruit.   Cardiovascular:      Rate and Rhythm: Normal rate and regular rhythm.      Pulses: Normal pulses.      Heart sounds: Normal heart sounds. No murmur heard.    No gallop.    Pulmonary:      Effort: Pulmonary effort is normal. No respiratory distress.      Breath sounds: Normal breath sounds. No rales.   Abdominal:      General: Bowel sounds are normal. There is no distension.      Palpations: Abdomen is soft.      Tenderness: There is no guarding.   Musculoskeletal:         General: Normal range of motion.      Cervical back: Normal range of motion and neck supple.      Right lower leg: Edema present.      Left lower leg: Edema present.   Skin:     General: Skin is warm and dry.      Capillary Refill: Capillary refill takes less than 2 seconds.      Findings: Bruising present.      Comments: S/P L surgical repair Charcot foot with boot in place     Neurological:      General: No focal deficit present.      Mental Status: He is alert and oriented to person, place, and time.   Psychiatric:         Mood and Affect: Mood normal.         Behavior: Behavior normal.         Thought Content: Thought content normal.         Judgment: Judgment normal.       Results Review:       Results from last 7 days   Lab Units 05/22/23  0540 05/19/23  0557 05/18/23  0658   SODIUM mmol/L 136 137  --    POTASSIUM mmol/L 4.3 4.2  --    CHLORIDE mmol/L 100 102  --    CO2 mmol/L 27.0 24.0  --    BUN mg/dL 18 20  --    CREATININE mg/dL 0.96 0.96 1.01   GLUCOSE mg/dL 153* 126*  --    CALCIUM mg/dL 9.1 8.7  --    BILIRUBIN mg/dL 0.6 0.7  --    ALK PHOS U/L 88 79  --    ALT (SGPT) U/L 25 20  --    AST (SGOT) U/L 22 18  --      Results from last 7 days   Lab Units 05/22/23  0540 05/19/23  0557   MAGNESIUM mg/dL 2.0 1.9     Results from last 7 days   Lab Units 05/22/23  0540 05/19/23  0557   WBC 10*3/mm3 4.71 4.21   HEMOGLOBIN g/dL 10.8* 9.7*   HEMATOCRIT % 34.5* 30.7*   PLATELETS 10*3/mm3 199 164     Lab Results   Component Value Date    CKTOTAL 119 04/23/2023    CKMB 2.16 04/23/2023    TROPONINI <0.012 11/19/2014    TROPONINT 52 (H) 04/25/2023     CO2   Date Value Ref Range Status   05/22/2023 27.0 22.0 - 29.0  mmol/L Final         Imaging Results (Most Recent)     None         No results found for: ACANTHNAEG, AFBCX, BPERTUSSISCX, BLOODCX  No results found for: BCIDPCR, CXREFLEX, CSFCX, CULTURETIS  No results found for: CULTURES, HSVCX, URCX  No results found for: EYECULTURE, GCCX, HSVCULTURE, LABHSV  No results found for: LEGIONELLA, MRSACX, MUMPSCX, MYCOPLASCX  No results found for: NOCARDIACX, STOOLCX  No results found for: THROATCX, UNSTIMCULT, URINECX, CULTURE, VZVCULTUR  No results found for: VIRALCULTU, WOUNDCX  Medication Reviewed and Will Continue Unless Documented in Plan:   apixaban, 5 mg, Oral, Q12H  ascorbic acid, 1,000 mg, Oral, Nightly  cetirizine, 10 mg, Oral, Daily  vitamin D3, 5,000 Units, Oral, Nightly  docusate sodium, 200 mg, Oral, BID  dronedarone, 400 mg, Oral, Daily With Dinner  fluticasone, 2 spray, Each Nare, Daily  glipizide, 10 mg, Oral, QAM AC  Insulin Aspart, 2-12 Units, Subcutaneous, 4x Daily With Meals & Nightly  magnesium oxide, 400 mg, Oral, BID  metoprolol succinate XL, 25 mg, Oral, BID  senna-docusate sodium, 2 tablet, Oral, BID  sodium bicarbonate, 650 mg, Oral, TID  tamsulosin, 0.4 mg, Oral, BID  vancomycin, 1,250 mg, Intravenous, Q12H      Pharmacy to dose vancomycin,       •  acetaminophen  •  bisacodyl  •  calcium carbonate  •  dextrose  •  hydrALAZINE  •  hydrOXYzine pamoate  •  muscle rub  •  ondansetron  •  oxyCODONE-acetaminophen  •  Pharmacy to dose vancomycin  •  simethicone  •  sodium chloride  •  traZODone  Pharmacy to dose vancomycin,        Dietary Orders (From admission, onward)     Start     Ordered    05/19/23 0800  DIET MESSAGE 1 pkg beneprotein powder in the hot water for hot tea at breakfast  Daily With Breakfast      Comments: 1 pkg beneprotein powder in the hot water for hot tea at breakfast    05/18/23 1438    05/18/23 1800  DIET MESSAGE 1 pkg beneprotein powder in his ice tea for dinner  Daily With Dinner      Comments: 1 pkg beneprotein powder in his ice tea  for dinner    05/18/23 1438    05/18/23 1800  DIET MESSAGE PT DISLIKES MILK>>>>> No coffee... No soup No orange juice/fruit juices.  Daily With Breakfast, Lunch & Dinner      Comments: PT DISLIKES MILK>>>>>  No coffee...  No soup  No orange juice/fruit juices.    05/18/23 1439    05/15/23 1824  Diet: Regular/House Diet, Diabetic Diets; Consistent Carbohydrate; Texture: Regular Texture (IDDSI 7); Fluid Consistency: Thin (IDDSI 0)  Diet Effective Now        References:    Diet Order Crosswalk   Question Answer Comment   Diets: Regular/House Diet    Diets: Diabetic Diets    Diabetic Diet: Consistent Carbohydrate    Texture: Regular Texture (IDDSI 7)    Fluid Consistency: Thin (IDDSI 0)        05/15/23 1827              History, physical exam, assessment and plan may have been partly or fully copied from before, but  changes made to the copied record to reflect care on the date of service. Part of the lab and imaging  reports auto populated and corrected. Some of this note may be an electronic transcription of spoken  language to printed text. This may permit erroneous, or at times, nonsensical words or phrases to be  inadvertently transcribed. Although I have reviewed the note for such errors, some may still exist.      This document has been electronically signed by Charlie Persaud MD on May 22, 2023 09:35 CDT

## 2023-05-23 ENCOUNTER — OUTSIDE FACILITY SERVICE (OUTPATIENT)
Dept: PULMONOLOGY | Facility: CLINIC | Age: 63
End: 2023-05-23
Payer: MEDICARE

## 2023-05-23 LAB
GLUCOSE BLDC GLUCOMTR-MCNC: 115 MG/DL (ref 70–130)
GLUCOSE BLDC GLUCOMTR-MCNC: 126 MG/DL (ref 70–130)
GLUCOSE BLDC GLUCOMTR-MCNC: 92 MG/DL (ref 70–130)

## 2023-05-23 PROCEDURE — 97530 THERAPEUTIC ACTIVITIES: CPT

## 2023-05-23 PROCEDURE — 82948 REAGENT STRIP/BLOOD GLUCOSE: CPT

## 2023-05-23 PROCEDURE — 97110 THERAPEUTIC EXERCISES: CPT

## 2023-05-23 PROCEDURE — 97535 SELF CARE MNGMENT TRAINING: CPT

## 2023-05-23 PROCEDURE — 25010000002 VANCOMYCIN 10 G RECONSTITUTED SOLUTION: Performed by: INTERNAL MEDICINE

## 2023-05-23 NOTE — ACP (ADVANCE CARE PLANNING)
[facility] @ [facility]  INPATIENT PROGRESS NOTE    PATIENT NAME: Emre Lisa      PHYSICIAN: Charlie Persaud MD  : 1960        MRN: [id number]  Patient Care Team:  Jamaal Bravo MD as PCP - Marco A Chauhan DPM as Consulting Physician (Podiatry)    Chief Complaint:  Continued antibiotic therapy and medical management  History of Present Illness: Patient is a 64 y/o male with a pmh of type 2 DM, hypertension, CAD, and neuropathy.  He recently underwent surgical repair of Charcot deformity with external fixator stabilization to his left foot related to cellulitis.  He tolerated the surgical procedure well.  He will need IV antibiotic therapy through 23 due to bone/joint infection.  He remains non-weight bearing of left lower extremity.  He tells me the pain to his left foot remains controlled, however, he does have a torn left rotator cuff and arthritis so his hip and shoulder are the source of his pain.  He tells me he is doing well and he has no other complaints or concerns.  I have reviewed the patients most recent labs and diagnostics independently.  Glucose 124, BUN 15, creatinine 0.89, sodium 138, potassium 4.2, chloride 105, co2 24, wbc 5.25, hgb 9.9, hct 30.1, platelets 156.       Assessment      Cellulitis of left foot  Charcot's joint of left foot  Type 2 DM  Arthritis  A-fib  Hypertension  Rheumatoid arthritis  DVT & GI prophylaxis  External fixator removal (05/15/2023)     DVT Prophylaxis: Heparin  GI Prophylaxis: Protonix  Code Status: Full    Plan     -Stable at time of exam.  -Continue aggressive therapy  -We will follow podiatry recommendations  -We will continue to monitor labs.  -Anticoagulation continues.  Tolerating well  -No shortness of breath or edema.   -Has boot in place on left foot.   -Patient remains clinically stable and improved.  -DVT and GI prophylaxis in place.  -We'll continue monitoring patient in  hospital setting and treat patient as course dictates.  -Please review orders for detailed plan of care.  -For Acute and Chronic medical condition we will continue medications described below in medication section which have been reviewed and we will continue unless changed in plan of care.  -Laboratory and diagnostic studies have been independently reviewed and the reports are reviewed as documented below.      Subjective   Interval History:   Patient Complaints: Patient seen and examined.  No overnight events reported.  Tells me he is doing well.   History taken from: Patient, patient's chart.    Review of Systems:    Review of Systems   Constitutional: Positive for activity change. Negative for chills and fever.   HENT: Negative for postnasal drip and tinnitus.    Respiratory: Negative for apnea.    Cardiovascular: Negative for chest pain and palpitations.   Gastrointestinal: Negative for abdominal distention, nausea and vomiting.   Musculoskeletal: Positive for arthralgias and myalgias.   Skin: Positive for wound.   Neurological: Negative for tremors and seizures.   Psychiatric/Behavioral: Negative for agitation, confusion and hallucinations.       Objective   Vital Signs:  Temp: 97 F         Heart Rate: 84         Resp: 20          Blood Pressure: 120/57         Pulse Ox: 100%    Physical Exam:   Physical Exam  Vitals reviewed.   Constitutional:       Appearance: Normal appearance. He is not ill-appearing.   HENT:      Head: Normocephalic and atraumatic.      Mouth/Throat:      Mouth: Mucous membranes are moist.      Pharynx: Oropharynx is clear.   Eyes:      Extraocular Movements: Extraocular movements intact.      Pupils: Pupils are equal, round, and reactive to light.   Neck:      Vascular: No carotid bruit.   Cardiovascular:      Rate and Rhythm: Normal rate and regular rhythm.      Pulses: Normal pulses.      Heart sounds: Normal heart sounds. No murmur heard.    No gallop.   Pulmonary:      Effort:  Pulmonary effort is normal. No respiratory distress.      Breath sounds: Normal breath sounds. No rales.   Abdominal:      General: Bowel sounds are normal. There is no distension.      Palpations: Abdomen is soft.      Tenderness: There is no guarding.   Musculoskeletal:         General: Normal range of motion.      Cervical back: Normal range of motion and neck supple.      Right lower leg: Edema present.      Left lower leg: Edema present.   Skin:     General: Skin is warm and dry.      Capillary Refill: Capillary refill takes less than 2 seconds.      Findings: Bruising present.      Comments: S/P L surgical repair Charcot foot with boot in place     Neurological:      General: No focal deficit present.      Mental Status: He is alert and oriented to person, place, and time.   Psychiatric:         Mood and Affect: Mood normal.         Behavior: Behavior normal.         Thought Content: Thought content normal.         Judgment: Judgment normal.       Results Review:       Results from last 7 days   Lab Units 05/22/23  0540 05/19/23  0557 05/18/23  0658   SODIUM mmol/L 136 137  --    POTASSIUM mmol/L 4.3 4.2  --    CHLORIDE mmol/L 100 102  --    CO2 mmol/L 27.0 24.0  --    BUN mg/dL 18 20  --    CREATININE mg/dL 0.96 0.96 1.01   GLUCOSE mg/dL 153* 126*  --    CALCIUM mg/dL 9.1 8.7  --    BILIRUBIN mg/dL 0.6 0.7  --    ALK PHOS U/L 88 79  --    ALT (SGPT) U/L 25 20  --    AST (SGOT) U/L 22 18  --      Results from last 7 days   Lab Units 05/22/23  0540 05/19/23  0557   MAGNESIUM mg/dL 2.0 1.9     Results from last 7 days   Lab Units 05/22/23  0540 05/19/23  0557   WBC 10*3/mm3 4.71 4.21   HEMOGLOBIN g/dL 10.8* 9.7*   HEMATOCRIT % 34.5* 30.7*   PLATELETS 10*3/mm3 199 164     Lab Results   Component Value Date    CKTOTAL 119 04/23/2023    CKMB 2.16 04/23/2023    TROPONINI <0.012 11/19/2014    TROPONINT 52 (H) 04/25/2023     CO2   Date Value Ref Range Status   05/22/2023 27.0 22.0 - 29.0 mmol/L Final         Imaging  Results (Most Recent)     None         No results found for: ACANTHNAEG, AFBCX, BPERTUSSISCX, BLOODCX  No results found for: BCIDPCR, CXREFLEX, CSFCX, CULTURETIS  No results found for: CULTURES, HSVCX, URCX  No results found for: EYECULTURE, GCCX, HSVCULTURE, LABHSV  No results found for: LEGIONELLA, MRSACX, MUMPSCX, MYCOPLASCX  No results found for: NOCARDIACX, STOOLCX  No results found for: THROATCX, UNSTIMCULT, URINECX, CULTURE, VZVCULTUR  No results found for: VIRALCULTU, WOUNDCX  Medication Reviewed and Will Continue Unless Documented in Plan:   apixaban, 5 mg, Oral, Q12H  ascorbic acid, 1,000 mg, Oral, Nightly  cetirizine, 10 mg, Oral, Daily  vitamin D3, 5,000 Units, Oral, Nightly  docusate sodium, 200 mg, Oral, BID  dronedarone, 400 mg, Oral, Daily With Dinner  fluticasone, 2 spray, Each Nare, Daily  glipizide, 10 mg, Oral, QAM AC  Insulin Aspart, 2-12 Units, Subcutaneous, 4x Daily With Meals & Nightly  magnesium oxide, 400 mg, Oral, BID  metoprolol succinate XL, 25 mg, Oral, BID  senna-docusate sodium, 2 tablet, Oral, BID  sodium bicarbonate, 650 mg, Oral, TID  tamsulosin, 0.4 mg, Oral, BID  vancomycin, 1,250 mg, Intravenous, Q12H      Pharmacy to dose vancomycin,       •  acetaminophen  •  bisacodyl  •  calcium carbonate  •  dextrose  •  hydrALAZINE  •  hydrOXYzine pamoate  •  muscle rub  •  ondansetron  •  Pharmacy to dose vancomycin  •  simethicone  •  sodium chloride  •  traZODone  Pharmacy to dose vancomycin,        Dietary Orders (From admission, onward)     Start     Ordered    05/22/23 1800  DIET MESSAGE Please send extra eggs at breakfast Extra portion of meat at lunch and supper  Daily With Breakfast, Lunch & Dinner      Comments: Please send extra eggs at breakfast   Extra portion of meat at lunch and supper    05/22/23 1529    05/18/23 1800  DIET MESSAGE PT DISLIKES MILK>>>>> No coffee... No soup No orange juice/fruit juices.  Daily With Breakfast, Lunch & Dinner      Comments: PT DISLIKES  MILK>>>>>  No coffee...  No soup  No orange juice/fruit juices.    05/18/23 1439    05/15/23 1824  Diet: Regular/House Diet, Diabetic Diets; Consistent Carbohydrate; Texture: Regular Texture (IDDSI 7); Fluid Consistency: Thin (IDDSI 0)  Diet Effective Now        References:    Diet Order Crosswalk   Question Answer Comment   Diets: Regular/House Diet    Diets: Diabetic Diets    Diabetic Diet: Consistent Carbohydrate    Texture: Regular Texture (IDDSI 7)    Fluid Consistency: Thin (IDDSI 0)        05/15/23 1827              History, physical exam, assessment and plan may have been partly or fully copied from before, but  changes made to the copied record to reflect care on the date of service. Part of the lab and imaging  reports auto populated and corrected. Some of this note may be an electronic transcription of spoken  language to printed text. This may permit erroneous, or at times, nonsensical words or phrases to be  inadvertently transcribed. Although I have reviewed the note for such errors, some may still exist.      This document has been electronically signed by Charlie Persaud MD on May 23, 2023 08:22 CDT

## 2023-05-23 NOTE — PROGRESS NOTES
Pharmacokinetics by Pharmacy - Vancomycin    Emre Lisa is a 63 y.o. male  [Ht: 182.9 ; Wt: 170 kg ] is being treated for bone/joint infection, day 39 of therapy.    Estimated Creatinine Clearance: 131.4 mL/min (by C-G formula based on SCr of 0.96 mg/dL).   Creatinine   Date Value Ref Range Status   05/22/2023 0.96 0.76 - 1.27 mg/dL Final   05/19/2023 0.96 0.76 - 1.27 mg/dL Final   05/18/2023 1.01 0.76 - 1.27 mg/dL Final   11/08/2022 1.0 0.7 - 1.3 mg/dL Final      Lab Results   Component Value Date    WBC 4.71 05/22/2023    WBC 4.21 05/19/2023    WBC 4.90 05/15/2023     C-Reactive Protein   Date Value Ref Range Status   04/13/2023 1.26 (H) 0.00 - 0.50 mg/dL Final   01/26/2022 <0.30 0.00 - 0.50 mg/dL Final   01/07/2022 19.20 (H) 0.00 - 0.50 mg/dL Final     Lactate   Date Value Ref Range Status   03/11/2016 1.5 0.5 - 2.0 mmol/L Final       Temp Readings from Last 1 Encounters:   05/15/23 97.9 °F (36.6 °C) (Temporal)      Lab Results   Component Value Date    VANCOPEAK 28.90 05/17/2023    VANCOTROUGH 15.60 05/18/2023    VANCORANDOM 38.90 04/28/2023       Culture Results:  Microbiology Results (last 10 days)       ** No results found for the last 240 hours. **          Current Vancomycin Dose:  1250 mg IVPB every 12 hours, day 29 of therapy.    Assessment/Plan:  Reviewed above labs and cultures. Pt is stable on current therapy. Will continue current dose.  Pharmacy will continue to monitor renal function and adjust dose accordingly. Consult is through 5/26/23.     Sowmya Verduzco, PharmD   05/23/23 08:32 CDT

## 2023-05-24 ENCOUNTER — OUTSIDE FACILITY SERVICE (OUTPATIENT)
Dept: PULMONOLOGY | Facility: CLINIC | Age: 63
End: 2023-05-24

## 2023-05-24 PROCEDURE — 97535 SELF CARE MNGMENT TRAINING: CPT

## 2023-05-24 PROCEDURE — 25010000002 VANCOMYCIN 10 G RECONSTITUTED SOLUTION: Performed by: INTERNAL MEDICINE

## 2023-05-24 PROCEDURE — 82948 REAGENT STRIP/BLOOD GLUCOSE: CPT

## 2023-05-24 PROCEDURE — 97530 THERAPEUTIC ACTIVITIES: CPT

## 2023-05-24 PROCEDURE — 97542 WHEELCHAIR MNGMENT TRAINING: CPT

## 2023-05-24 NOTE — ACP (ADVANCE CARE PLANNING)
[facility] @ [facility]  INPATIENT PROGRESS NOTE    PATIENT NAME: Emre Lisa      PHYSICIAN: Charlie Persaud MD  : 1960        MRN: [id number]  Patient Care Team:  Jamaal Bravo MD as PCP - Marco A Chauhan DPM as Consulting Physician (Podiatry)    Chief Complaint:  Continued antibiotic therapy and medical management  History of Present Illness: Patient is a 64 y/o male with a pmh of type 2 DM, hypertension, CAD, and neuropathy.  He recently underwent surgical repair of Charcot deformity with external fixator stabilization to his left foot related to cellulitis.  He tolerated the surgical procedure well.  He will need IV antibiotic therapy through 23 due to bone/joint infection.  He remains non-weight bearing of left lower extremity.  He tells me the pain to his left foot remains controlled, however, he does have a torn left rotator cuff and arthritis so his hip and shoulder are the source of his pain.  He tells me he is doing well and he has no other complaints or concerns.  I have reviewed the patients most recent labs and diagnostics independently.  Glucose 124, BUN 15, creatinine 0.89, sodium 138, potassium 4.2, chloride 105, co2 24, wbc 5.25, hgb 9.9, hct 30.1, platelets 156.       Assessment      Cellulitis of left foot  Charcot's joint of left foot  Type 2 DM  Arthritis  A-fib  Hypertension  Rheumatoid arthritis  DVT & GI prophylaxis  External fixator removal (05/15/2023)     DVT Prophylaxis: Heparin  GI Prophylaxis: Protonix  Code Status: Full    Plan     -Stable at time of exam.  -Continue aggressive therapy  -We will follow podiatry recommendations  -We will continue to monitor labs.  -Anticoagulation continues.  Tolerating well  -No shortness of breath or edema.   -Has boot in place on left foot.   -Patient remains clinically stable and improved.  -DVT and GI prophylaxis in place.  -We'll continue monitoring patient in  hospital setting and treat patient as course dictates.  -Please review orders for detailed plan of care.  -For Acute and Chronic medical condition we will continue medications described below in medication section which have been reviewed and we will continue unless changed in plan of care.  -Laboratory and diagnostic studies have been independently reviewed and the reports are reviewed as documented below.      Subjective   Interval History:   Patient Complaints: Patient seen and examined.  Up in w/c in [place] with therapy. No concerns or needs at this time.  History taken from: Patient, patient's chart.    Review of Systems:    Review of Systems   Constitutional: Positive for activity change. Negative for chills and fever.   HENT: Negative for postnasal drip and tinnitus.    Respiratory: Negative for apnea.    Cardiovascular: Negative for chest pain and palpitations.   Gastrointestinal: Negative for abdominal distention, nausea and vomiting.   Musculoskeletal: Positive for arthralgias and myalgias.   Skin: Positive for wound.   Neurological: Negative for tremors and seizures.   Psychiatric/Behavioral: Negative for agitation, confusion and hallucinations.       Objective   Vital Signs:  Temp: 98.3 F         Heart Rate: 73         Resp: 20          Blood Pressure: 126/72         Pulse Ox: 97%    Physical Exam:   Physical Exam  Vitals reviewed.   Constitutional:       Appearance: Normal appearance. He is not ill-appearing.   HENT:      Head: Normocephalic and atraumatic.      Mouth/Throat:      Mouth: Mucous membranes are moist.      Pharynx: Oropharynx is clear.   Eyes:      Extraocular Movements: Extraocular movements intact.      Pupils: Pupils are equal, round, and reactive to light.   Neck:      Vascular: No carotid bruit.   Cardiovascular:      Rate and Rhythm: Normal rate and regular rhythm.      Pulses: Normal pulses.      Heart sounds: Normal heart sounds. No murmur heard.    No gallop.   Pulmonary:       Effort: Pulmonary effort is normal. No respiratory distress.      Breath sounds: Normal breath sounds. No rales.   Abdominal:      General: Bowel sounds are normal. There is no distension.      Palpations: Abdomen is soft.      Tenderness: There is no guarding.   Musculoskeletal:         General: Normal range of motion.      Cervical back: Normal range of motion and neck supple.      Right lower leg: Edema present.      Left lower leg: Edema present.   Skin:     General: Skin is warm and dry.      Capillary Refill: Capillary refill takes less than 2 seconds.      Findings: Bruising present.      Comments: S/P L surgical repair Charcot foot with boot in place     Neurological:      General: No focal deficit present.      Mental Status: He is alert and oriented to person, place, and time.   Psychiatric:         Mood and Affect: Mood normal.         Behavior: Behavior normal.         Thought Content: Thought content normal.         Judgment: Judgment normal.       Results Review:       Results from last 7 days   Lab Units 05/22/23  0540 05/19/23  0557 05/18/23  0658   SODIUM mmol/L 136 137  --    POTASSIUM mmol/L 4.3 4.2  --    CHLORIDE mmol/L 100 102  --    CO2 mmol/L 27.0 24.0  --    BUN mg/dL 18 20  --    CREATININE mg/dL 0.96 0.96 1.01   GLUCOSE mg/dL 153* 126*  --    CALCIUM mg/dL 9.1 8.7  --    BILIRUBIN mg/dL 0.6 0.7  --    ALK PHOS U/L 88 79  --    ALT (SGPT) U/L 25 20  --    AST (SGOT) U/L 22 18  --      Results from last 7 days   Lab Units 05/22/23  0540 05/19/23  0557   MAGNESIUM mg/dL 2.0 1.9     Results from last 7 days   Lab Units 05/22/23  0540 05/19/23  0557   WBC 10*3/mm3 4.71 4.21   HEMOGLOBIN g/dL 10.8* 9.7*   HEMATOCRIT % 34.5* 30.7*   PLATELETS 10*3/mm3 199 164     Lab Results   Component Value Date    CKTOTAL 119 04/23/2023    CKMB 2.16 04/23/2023    TROPONINI <0.012 11/19/2014    TROPONINT 52 (H) 04/25/2023     CO2   Date Value Ref Range Status   05/22/2023 27.0 22.0 - 29.0 mmol/L Final          Imaging Results (Most Recent)     None         No results found for: ACANTHNAEG, AFBCX, BPERTUSSISCX, BLOODCX  No results found for: BCIDPCR, CXREFLEX, CSFCX, CULTURETIS  No results found for: CULTURES, HSVCX, URCX  No results found for: EYECULTURE, GCCX, HSVCULTURE, LABHSV  No results found for: LEGIONELLA, MRSACX, MUMPSCX, MYCOPLASCX  No results found for: NOCARDIACX, STOOLCX  No results found for: THROATCX, UNSTIMCULT, URINECX, CULTURE, VZVCULTUR  No results found for: VIRALCULTU, WOUNDCX  Medication Reviewed and Will Continue Unless Documented in Plan:   apixaban, 5 mg, Oral, Q12H  ascorbic acid, 1,000 mg, Oral, Nightly  cetirizine, 10 mg, Oral, Daily  vitamin D3, 5,000 Units, Oral, Nightly  docusate sodium, 200 mg, Oral, BID  dronedarone, 400 mg, Oral, Daily With Dinner  fluticasone, 2 spray, Each Nare, Daily  glipizide, 10 mg, Oral, QAM AC  Insulin Aspart, 2-12 Units, Subcutaneous, 4x Daily With Meals & Nightly  magnesium oxide, 400 mg, Oral, BID  metoprolol succinate XL, 25 mg, Oral, BID  senna-docusate sodium, 2 tablet, Oral, BID  sodium bicarbonate, 650 mg, Oral, TID  tamsulosin, 0.4 mg, Oral, BID  vancomycin, 1,250 mg, Intravenous, Q12H      Pharmacy to dose vancomycin,       •  acetaminophen  •  bisacodyl  •  calcium carbonate  •  dextrose  •  hydrALAZINE  •  hydrOXYzine pamoate  •  muscle rub  •  ondansetron  •  Pharmacy to dose vancomycin  •  simethicone  •  sodium chloride  •  traZODone  Pharmacy to dose vancomycin,        Dietary Orders (From admission, onward)     Start     Ordered    05/22/23 1800  DIET MESSAGE Please send extra eggs at breakfast Extra portion of meat at lunch and supper  Daily With Breakfast, Lunch & Dinner      Comments: Please send extra eggs at breakfast   Extra portion of meat at lunch and supper    05/22/23 1529    05/18/23 1800  DIET MESSAGE PT DISLIKES MILK>>>>> No coffee... No soup No orange juice/fruit juices.  Daily With Breakfast, Lunch & Dinner      Comments: PT  DISLIKES MILK>>>>>  No coffee...  No soup  No orange juice/fruit juices.    05/18/23 1439    05/15/23 1824  Diet: Regular/House Diet, Diabetic Diets; Consistent Carbohydrate; Texture: Regular Texture (IDDSI 7); Fluid Consistency: Thin (IDDSI 0)  Diet Effective Now        References:    Diet Order Crosswalk   Question Answer Comment   Diets: Regular/House Diet    Diets: Diabetic Diets    Diabetic Diet: Consistent Carbohydrate    Texture: Regular Texture (IDDSI 7)    Fluid Consistency: Thin (IDDSI 0)        05/15/23 1827              History, physical exam, assessment and plan may have been partly or fully copied from before, but  changes made to the copied record to reflect care on the date of service. Part of the lab and imaging  reports auto populated and corrected. Some of this note may be an electronic transcription of spoken  language to printed text. This may permit erroneous, or at times, nonsensical words or phrases to be  inadvertently transcribed. Although I have reviewed the note for such errors, some may still exist.      This document has been electronically signed by Charlie Persaud MD on May 24, 2023 10:36 CDT

## 2023-05-24 NOTE — PROGRESS NOTES
Pharmacokinetics by Pharmacy - Vancomycin    Emre Lisa is a 63 y.o. male  [Ht: 182.9 cm  ; Wt: 170 kg ] is being treated for bone/joint infection, day 40 of therapy.    Estimated Creatinine Clearance: 131.4 mL/min (by C-G formula based on SCr of 0.96 mg/dL).   Creatinine   Date Value Ref Range Status   05/22/2023 0.96 0.76 - 1.27 mg/dL Final   05/19/2023 0.96 0.76 - 1.27 mg/dL Final   05/18/2023 1.01 0.76 - 1.27 mg/dL Final   11/08/2022 1.0 0.7 - 1.3 mg/dL Final      Lab Results   Component Value Date    WBC 4.71 05/22/2023    WBC 4.21 05/19/2023    WBC 4.90 05/15/2023     C-Reactive Protein   Date Value Ref Range Status   04/13/2023 1.26 (H) 0.00 - 0.50 mg/dL Final   01/26/2022 <0.30 0.00 - 0.50 mg/dL Final   01/07/2022 19.20 (H) 0.00 - 0.50 mg/dL Final     Lactate   Date Value Ref Range Status   03/11/2016 1.5 0.5 - 2.0 mmol/L Final       Temp Readings from Last 1 Encounters:   05/15/23 97.9 °F (36.6 °C) (Temporal)      Lab Results   Component Value Date    VANCOPEAK 28.90 05/17/2023    VANCOTROUGH 15.60 05/18/2023    VANCORANDOM 38.90 04/28/2023         Culture Results:  Microbiology Results (last 10 days)       ** No results found for the last 240 hours. **            Current Vancomycin Dose:  1250 mg IVPB every 12 hours, day 40 of therapy.     Assessment/Plan:  Reviewed above labs and cultures. No new levels to monitor trends. Labs are every Mon and Fri. Pt has been stable on current dose. Last trough level on 5/18 in goal range. Therapy is complete after 2 more doses.  No change in therapy plan.     Sowmya Verduzco, PharmD   05/24/23 08:32 CDT

## 2023-05-25 ENCOUNTER — OUTSIDE FACILITY SERVICE (OUTPATIENT)
Dept: PULMONOLOGY | Facility: CLINIC | Age: 63
End: 2023-05-25

## 2023-05-25 LAB
GLUCOSE BLDC GLUCOMTR-MCNC: 120 MG/DL (ref 70–130)
GLUCOSE BLDC GLUCOMTR-MCNC: 122 MG/DL (ref 70–130)
GLUCOSE BLDC GLUCOMTR-MCNC: 138 MG/DL (ref 70–130)
GLUCOSE BLDC GLUCOMTR-MCNC: 147 MG/DL (ref 70–130)
GLUCOSE BLDC GLUCOMTR-MCNC: 150 MG/DL (ref 70–130)
GLUCOSE BLDC GLUCOMTR-MCNC: 161 MG/DL (ref 70–130)
GLUCOSE BLDC GLUCOMTR-MCNC: 326 MG/DL (ref 70–130)
GLUCOSE BLDC GLUCOMTR-MCNC: 98 MG/DL (ref 70–130)

## 2023-05-25 PROCEDURE — 82948 REAGENT STRIP/BLOOD GLUCOSE: CPT

## 2023-05-25 PROCEDURE — 25010000002 VANCOMYCIN 10 G RECONSTITUTED SOLUTION: Performed by: INTERNAL MEDICINE

## 2023-05-25 PROCEDURE — 97530 THERAPEUTIC ACTIVITIES: CPT

## 2023-05-25 PROCEDURE — 97110 THERAPEUTIC EXERCISES: CPT

## 2023-05-25 NOTE — PROGRESS NOTES
Pharmacokinetics by Pharmacy - Vancomycin    Emre Lisa is a 63 y.o. male receiving vancomycin 1250 mg IV q12hr (day 40) for bone and/or joint infection.    Objective:    Temp Readings from Last 1 Encounters:   05/15/23 97.9 °F (36.6 °C) (Temporal)     No results found for: WBC No results found for: CRP, LACTATE   No results found for: CREATININE    No results found for: VANCOPEAK, VANCOTROUGH, VANCORANDOM    Culture Results:  Microbiology Results (last 10 days)     ** No results found for the last 240 hours. **        No results found for: RESPCX    [Ht:  ; Wt:  ]   There is no height or weight on file to calculate BMI.  Ideal body weight: 77.6 kg (171 lb 1.2 oz)  Adjusted ideal body weight: 115 kg (252 lb 14.8 oz)      Assessment:  • WBCs WNL  • Creatinine WNL.   • Vancomycin WNL on 5/18  • AUC and trough goals are 400-600 and 10-20 mcg/mL, respectively.     Plan:  1. Continue Vancomycin 1250 mg IV q12hrs  2. Vancomycin levels when clinically indicated. Consulted until 5/26.  3. Pharmacy will monitor renal function and adjust dose accordingly.    Thank you for this consult.     Estiven Cannon [facility]   05/25/23 08:57 CDT

## 2023-05-25 NOTE — ACP (ADVANCE CARE PLANNING)
Reason for assessment:  Nutrition Follow up     Nutrition Diet/History:    -Typical Intake:  Pt eating lunch.  States that he is doing well with meals.  He did have a couple of requests.  States that he was weighing 364#.  He purposely has tried to cut back to lose some wt.  He knows that he needs extra protein for healing.  He does not like milk and does not want ice cream.   -  Weight history:  CBW:  326.3  BMI of 4 44.2 indicating Morbid Obesity.    UBW is 364#---wt loss related to current medical condition along with intentionally trying to cut back on portions/calories    Labs:   No new readings--Addressed   Meds:  Eliquis; VitC; VitD; Colace; Glucotrol; SSI;        Nutrition Prescription Order:    Dietary Orders (From admission, onward)     Start     Ordered    05/25/23 1800  DIET MESSAGE Please send extra portion of eggs at breakfast and 2 pc of garcia; Extra portion of meat at lunch and supper  Daily With Breakfast, Lunch & Dinner      Comments: Please send extra portion of eggs at breakfast and 2 pc of garcia;   Extra portion of meat at lunch and supper    05/25/23 1325    05/18/23 1800  DIET MESSAGE PT DISLIKES MILK>>>>> No coffee... No soup No orange juice/fruit juices.  Daily With Breakfast, Lunch & Dinner      Comments: PT DISLIKES MILK>>>>>  No coffee...  No soup  No orange juice/fruit juices.    05/18/23 1439    05/15/23 1824  Diet: Regular/House Diet, Diabetic Diets; Consistent Carbohydrate; Texture: Regular Texture (IDDSI 7); Fluid Consistency: Thin (IDDSI 0)  Diet Effective Now        References:    Diet Order Crosswalk   Question Answer Comment   Diets: Regular/House Diet    Diets: Diabetic Diets    Diabetic Diet: Consistent Carbohydrate    Texture: Regular Texture (IDDSI 7)    Fluid Consistency: Thin (IDDSI 0)        05/15/23 1827                            Evaluation of Nutrition Intake:  ~100% at most meals     Evaluation of Nutrition Assessment:  Excellent intake continues.  We discussed the  need for increased protein intake.  He is aware.  RD will continue Extra eggs at breakfast and extra meat at lunch and supper for wound healing.  BMI remains at 44.2 indicating Morbid obesity.  His wt has trended down from UBW of 364 per his report.  Wt loss related to acute medical condition and pt intentionally limited portions/calories in an effort to lose wt.  RD will continue to monitor POC and po intake.       Last BM:  5/24     Wounds:  Left ankle ORIF; Left Ankle wound; Incistion healing (Per wound care Nsg--wounds are healing nicely)     Nutrition Intervention:  Will continue extra protein foods;  Adequate po and protein intake encouraged;   Pt provided with menu suggestions and alternatives.

## 2023-05-26 ENCOUNTER — OUTSIDE FACILITY SERVICE (OUTPATIENT)
Dept: PULMONOLOGY | Facility: CLINIC | Age: 63
End: 2023-05-26

## 2023-05-26 LAB
ALBUMIN SERPL-MCNC: 3.7 G/DL (ref 3.5–5.2)
ALBUMIN/GLOB SERPL: 1.1 G/DL
ALP SERPL-CCNC: 100 U/L (ref 39–117)
ALT SERPL W P-5'-P-CCNC: 20 U/L (ref 1–41)
ANION GAP SERPL CALCULATED.3IONS-SCNC: 12 MMOL/L (ref 5–15)
AST SERPL-CCNC: 23 U/L (ref 1–40)
BILIRUB SERPL-MCNC: 0.6 MG/DL (ref 0–1.2)
BUN SERPL-MCNC: 21 MG/DL (ref 8–23)
BUN/CREAT SERPL: 22.3 (ref 7–25)
CALCIUM SPEC-SCNC: 9.2 MG/DL (ref 8.6–10.5)
CHLORIDE SERPL-SCNC: 100 MMOL/L (ref 98–107)
CO2 SERPL-SCNC: 24 MMOL/L (ref 22–29)
CREAT SERPL-MCNC: 0.94 MG/DL (ref 0.76–1.27)
DEPRECATED RDW RBC AUTO: 46.5 FL (ref 37–54)
EGFRCR SERPLBLD CKD-EPI 2021: 92.2 ML/MIN/1.73
ERYTHROCYTE [DISTWIDTH] IN BLOOD BY AUTOMATED COUNT: 14.6 % (ref 12.3–15.4)
GLOBULIN UR ELPH-MCNC: 3.3 GM/DL
GLUCOSE BLDC GLUCOMTR-MCNC: 114 MG/DL (ref 70–130)
GLUCOSE BLDC GLUCOMTR-MCNC: 121 MG/DL (ref 70–130)
GLUCOSE BLDC GLUCOMTR-MCNC: 210 MG/DL (ref 70–130)
GLUCOSE BLDC GLUCOMTR-MCNC: 219 MG/DL (ref 70–130)
GLUCOSE SERPL-MCNC: 200 MG/DL (ref 65–99)
HCT VFR BLD AUTO: 34.4 % (ref 37.5–51)
HGB BLD-MCNC: 10.8 G/DL (ref 13–17.7)
MAGNESIUM SERPL-MCNC: 1.9 MG/DL (ref 1.6–2.4)
MCH RBC QN AUTO: 27.7 PG (ref 26.6–33)
MCHC RBC AUTO-ENTMCNC: 31.4 G/DL (ref 31.5–35.7)
MCV RBC AUTO: 88.2 FL (ref 79–97)
PLATELET # BLD AUTO: 233 10*3/MM3 (ref 140–450)
PMV BLD AUTO: 10.9 FL (ref 6–12)
POTASSIUM SERPL-SCNC: 4.6 MMOL/L (ref 3.5–5.2)
PROT SERPL-MCNC: 7 G/DL (ref 6–8.5)
RBC # BLD AUTO: 3.9 10*6/MM3 (ref 4.14–5.8)
SODIUM SERPL-SCNC: 136 MMOL/L (ref 136–145)
WBC NRBC COR # BLD: 4.34 10*3/MM3 (ref 3.4–10.8)

## 2023-05-26 PROCEDURE — 80053 COMPREHEN METABOLIC PANEL: CPT | Performed by: INTERNAL MEDICINE

## 2023-05-26 PROCEDURE — 97530 THERAPEUTIC ACTIVITIES: CPT

## 2023-05-26 PROCEDURE — 82948 REAGENT STRIP/BLOOD GLUCOSE: CPT

## 2023-05-26 PROCEDURE — 85027 COMPLETE CBC AUTOMATED: CPT | Performed by: INTERNAL MEDICINE

## 2023-05-26 PROCEDURE — 83735 ASSAY OF MAGNESIUM: CPT | Performed by: INTERNAL MEDICINE

## 2023-05-26 RX ORDER — OXYCODONE AND ACETAMINOPHEN 7.5; 325 MG/1; MG/1
1 TABLET ORAL EVERY 4 HOURS PRN
Status: DISCONTINUED | OUTPATIENT
Start: 2023-05-26 | End: 2023-05-30 | Stop reason: HOSPADM

## 2023-05-26 NOTE — ACP (ADVANCE CARE PLANNING)
[facility] @ [facility]  INPATIENT PROGRESS NOTE    PATIENT NAME: Emre Lisa      PHYSICIAN: Charlie Persaud MD  : 1960        MRN: [id number]  Patient Care Team:  Jamaal Bravo MD as PCP - Marco A Chauhan DPM as Consulting Physician (Podiatry)    Chief Complaint:  Continued antibiotic therapy and medical management  History of Present Illness: Patient is a 62 y/o male with a pmh of type 2 DM, hypertension, CAD, and neuropathy.  He recently underwent surgical repair of Charcot deformity with external fixator stabilization to his left foot related to cellulitis.  He tolerated the surgical procedure well.  He will need IV antibiotic therapy through 23 due to bone/joint infection.  He remains non-weight bearing of left lower extremity.  He tells me the pain to his left foot remains controlled, however, he does have a torn left rotator cuff and arthritis so his hip and shoulder are the source of his pain.  He tells me he is doing well and he has no other complaints or concerns.  I have reviewed the patients most recent labs and diagnostics independently.  Glucose 124, BUN 15, creatinine 0.89, sodium 138, potassium 4.2, chloride 105, co2 24, wbc 5.25, hgb 9.9, hct 30.1, platelets 156.       Assessment      Cellulitis of left foot  Charcot's joint of left foot  Type 2 DM  Arthritis  A-fib  Hypertension  Rheumatoid arthritis  DVT & GI prophylaxis  External fixator removal (05/15/2023)     DVT Prophylaxis: Heparin  GI Prophylaxis: Protonix  Code Status: Full    Plan     -Stable at time of exam.  -Continue aggressive therapy  -We will follow podiatry recommendations  -We will continue to monitor labs.  -Anticoagulation continues.  Tolerating well  -No shortness of breath or edema.   -Has boot in place on left foot.   -Patient remains clinically stable and improved.  -We will consider discharging patient if clinical improvement  continues.  -DVT and GI prophylaxis in place.  -We'll continue monitoring patient in hospital setting and treat patient as course dictates.  -Please review orders for detailed plan of care.  -For Acute and Chronic medical condition we will continue medications described below in medication section which have been reviewed and we will continue unless changed in plan of care.  -Laboratory and diagnostic studies have been independently reviewed and the reports are reviewed as documented below.      Subjective   Interval History:   Patient Complaints: Patient seen and examined.  Up in hallway in w/c working with therapy.  No overnight concerns noted.   History taken from: Patient, patient's chart.    Review of Systems:    Review of Systems   Constitutional: Positive for activity change. Negative for chills and fever.   HENT: Negative for postnasal drip and tinnitus.    Respiratory: Negative for apnea.    Cardiovascular: Negative for chest pain and palpitations.   Gastrointestinal: Negative for abdominal distention, nausea and vomiting.   Musculoskeletal: Positive for arthralgias and myalgias.   Skin: Positive for wound.   Neurological: Negative for tremors and seizures.   Psychiatric/Behavioral: Negative for agitation, confusion and hallucinations.       Objective   Vital Signs:  Temp: 97.6F         Heart Rate: 76         Resp: 24          Blood Pressure: 146/72         Pulse Ox: 97%    Physical Exam:   Physical Exam  Vitals reviewed.   Constitutional:       Appearance: Normal appearance. He is not ill-appearing.   HENT:      Head: Normocephalic and atraumatic.      Mouth/Throat:      Mouth: Mucous membranes are moist.      Pharynx: Oropharynx is clear.   Eyes:      Extraocular Movements: Extraocular movements intact.      Pupils: Pupils are equal, round, and reactive to light.   Neck:      Vascular: No carotid bruit.   Cardiovascular:      Rate and Rhythm: Normal rate and regular rhythm.      Pulses: Normal pulses.       Heart sounds: Normal heart sounds. No murmur heard.    No gallop.   Pulmonary:      Effort: Pulmonary effort is normal. No respiratory distress.      Breath sounds: Normal breath sounds. No rales.   Abdominal:      General: Bowel sounds are normal. There is no distension.      Palpations: Abdomen is soft.      Tenderness: There is no guarding.   Musculoskeletal:         General: Normal range of motion.      Cervical back: Normal range of motion and neck supple.      Right lower leg: Edema present.      Left lower leg: Edema present.   Skin:     General: Skin is warm and dry.      Capillary Refill: Capillary refill takes less than 2 seconds.      Findings: Bruising present.      Comments: S/P L surgical repair Charcot foot with boot in place     Neurological:      General: No focal deficit present.      Mental Status: He is alert and oriented to person, place, and time.   Psychiatric:         Mood and Affect: Mood normal.         Behavior: Behavior normal.         Thought Content: Thought content normal.         Judgment: Judgment normal.       Results Review:       Results from last 7 days   Lab Units 05/26/23  0531 05/22/23  0540   SODIUM mmol/L 136 136   POTASSIUM mmol/L 4.6 4.3   CHLORIDE mmol/L 100 100   CO2 mmol/L 24.0 27.0   BUN mg/dL 21 18   CREATININE mg/dL 0.94 0.96   GLUCOSE mg/dL 200* 153*   CALCIUM mg/dL 9.2 9.1   BILIRUBIN mg/dL 0.6 0.6   ALK PHOS U/L 100 88   ALT (SGPT) U/L 20 25   AST (SGOT) U/L 23 22     Results from last 7 days   Lab Units 05/26/23  0531 05/22/23  0540   MAGNESIUM mg/dL 1.9 2.0     Results from last 7 days   Lab Units 05/26/23  0530 05/22/23  0540   WBC 10*3/mm3 4.34 4.71   HEMOGLOBIN g/dL 10.8* 10.8*   HEMATOCRIT % 34.4* 34.5*   PLATELETS 10*3/mm3 233 199     Lab Results   Component Value Date    CKTOTAL 119 04/23/2023    CKMB 2.16 04/23/2023    TROPONINI <0.012 11/19/2014    TROPONINT 52 (H) 04/25/2023     CO2   Date Value Ref Range Status   05/26/2023 24.0 22.0 - 29.0 mmol/L  Final         Imaging Results (Most Recent)     None         No results found for: ACANTHNAEG, AFBCX, BPERTUSSISCX, BLOODCX  No results found for: BCIDPCR, CXREFLEX, CSFCX, CULTURETIS  No results found for: CULTURES, HSVCX, URCX  No results found for: EYECULTURE, GCCX, HSVCULTURE, LABHSV  No results found for: LEGIONELLA, MRSACX, MUMPSCX, MYCOPLASCX  No results found for: NOCARDIACX, STOOLCX  No results found for: THROATCX, UNSTIMCULT, URINECX, CULTURE, VZVCULTUR  No results found for: VIRALCULTU, WOUNDCX  Medication Reviewed and Will Continue Unless Documented in Plan:   apixaban, 5 mg, Oral, Q12H  ascorbic acid, 1,000 mg, Oral, Nightly  cetirizine, 10 mg, Oral, Daily  vitamin D3, 5,000 Units, Oral, Nightly  docusate sodium, 200 mg, Oral, BID  dronedarone, 400 mg, Oral, Daily With Dinner  fluticasone, 2 spray, Each Nare, Daily  glipizide, 10 mg, Oral, QAM AC  Insulin Aspart, 2-12 Units, Subcutaneous, 4x Daily With Meals & Nightly  magnesium oxide, 400 mg, Oral, BID  metoprolol succinate XL, 25 mg, Oral, BID  senna-docusate sodium, 2 tablet, Oral, BID  sodium bicarbonate, 650 mg, Oral, TID  tamsulosin, 0.4 mg, Oral, BID         •  acetaminophen  •  bisacodyl  •  calcium carbonate  •  dextrose  •  hydrALAZINE  •  hydrOXYzine pamoate  •  muscle rub  •  ondansetron  •  oxyCODONE-acetaminophen  •  simethicone  •  sodium chloride  •  traZODone      Dietary Orders (From admission, onward)     Start     Ordered    05/25/23 1800  DIET MESSAGE Please send extra portion of eggs at breakfast and 2 pc of garcia; Extra portion of meat at lunch and supper  Daily With Breakfast, Lunch & Dinner      Comments: Please send extra portion of eggs at breakfast and 2 pc of garcia;   Extra portion of meat at lunch and supper    05/25/23 1325    05/18/23 1800  DIET MESSAGE PT DISLIKES MILK>>>>> No coffee... No soup No orange juice/fruit juices.  Daily With Breakfast, Lunch & Dinner      Comments: PT DISLIKES MILK>>>>>  No coffee...  No  soup  No orange juice/fruit juices.    05/18/23 1439    05/15/23 1824  Diet: Regular/House Diet, Diabetic Diets; Consistent Carbohydrate; Texture: Regular Texture (IDDSI 7); Fluid Consistency: Thin (IDDSI 0)  Diet Effective Now        References:    Diet Order Crosswalk   Question Answer Comment   Diets: Regular/House Diet    Diets: Diabetic Diets    Diabetic Diet: Consistent Carbohydrate    Texture: Regular Texture (IDDSI 7)    Fluid Consistency: Thin (IDDSI 0)        05/15/23 1827              History, physical exam, assessment and plan may have been partly or fully copied from before, but  changes made to the copied record to reflect care on the date of service. Part of the lab and imaging  reports auto populated and corrected. Some of this note may be an electronic transcription of spoken  language to printed text. This may permit erroneous, or at times, nonsensical words or phrases to be  inadvertently transcribed. Although I have reviewed the note for such errors, some may still exist.      This document has been electronically signed by Charlie Persaud MD on May 26, 2023 09:59 CDT

## 2023-05-27 ENCOUNTER — OUTSIDE FACILITY SERVICE (OUTPATIENT)
Dept: PULMONOLOGY | Facility: CLINIC | Age: 63
End: 2023-05-27

## 2023-05-27 LAB
GLUCOSE BLDC GLUCOMTR-MCNC: 134 MG/DL (ref 70–130)
GLUCOSE BLDC GLUCOMTR-MCNC: 135 MG/DL (ref 70–130)

## 2023-05-27 PROCEDURE — 82948 REAGENT STRIP/BLOOD GLUCOSE: CPT

## 2023-05-27 NOTE — ACP (ADVANCE CARE PLANNING)
[facility] @ [facility]  INPATIENT PROGRESS NOTE    PATIENT NAME: Emre Lisa      PHYSICIAN: Charlie Persaud MD  : 1960        MRN: [id number]  Patient Care Team:  Jamaal Bravo MD as PCP - Marco A Chauhan DPM as Consulting Physician (Podiatry)    Chief Complaint:  Continued antibiotic therapy and medical management  History of Present Illness: Patient is a 62 y/o male with a pmh of type 2 DM, hypertension, CAD, and neuropathy.  He recently underwent surgical repair of Charcot deformity with external fixator stabilization to his left foot related to cellulitis.  He tolerated the surgical procedure well.  He will need IV antibiotic therapy through 23 due to bone/joint infection.  He remains non-weight bearing of left lower extremity.  He tells me the pain to his left foot remains controlled, however, he does have a torn left rotator cuff and arthritis so his hip and shoulder are the source of his pain.  He tells me he is doing well and he has no other complaints or concerns.  I have reviewed the patients most recent labs and diagnostics independently.  Glucose 124, BUN 15, creatinine 0.89, sodium 138, potassium 4.2, chloride 105, co2 24, wbc 5.25, hgb 9.9, hct 30.1, platelets 156.       Assessment      Cellulitis of left foot  Charcot's joint of left foot  Type 2 DM  Arthritis  A-fib  Hypertension  Rheumatoid arthritis  DVT & GI prophylaxis  External fixator removal (05/15/2023)     DVT Prophylaxis: Heparin  GI Prophylaxis: Protonix  Code Status: Full    Plan     -Patient nhung clinically stable improving.  -Continue aggressive therapy.  -Podiatry follow-up appreciated.  -Continue with oral anticoagulation.  -PT/OT as tolerated.  -We will try to get patient out of bed to chair as tolerated.  -Significant improvement clinically and will consider discharging soon.  -Discharge planning in progress.  -DVT and GI prophylaxis in  place.  -We'll continue monitoring patient in hospital setting and treat patient as course dictates.  -Please review orders for detailed plan of care.  -For Acute and Chronic medical condition we will continue medications described below in medication section which have been reviewed and we will continue unless changed in plan of care.  -Laboratory and diagnostic studies have been independently reviewed and the reports are reviewed as documented below.      Subjective   Interval History:   Patient Complaints:   Patient seen and examined.  Patient resting comfortably.  Patient excited to go home.  No significant events reported overnight.  History taken from:   Patient, patient's chart and nursing staff.    Review of Systems:    Review of Systems   Constitutional: Positive for activity change. Negative for chills and fever.   HENT: Negative for postnasal drip and tinnitus.    Respiratory: Negative for apnea.    Cardiovascular: Negative for chest pain and palpitations.   Gastrointestinal: Negative for abdominal distention, nausea and vomiting.   Musculoskeletal: Positive for arthralgias and myalgias.   Skin: Positive for wound.   Neurological: Negative for tremors and seizures.   Psychiatric/Behavioral: Negative for agitation, confusion and hallucinations.       Objective   Vital Signs:  Temp: 98.3 F         Heart Rate: 79         resp: 20          blood Pressure: 134/66         Pulse Ox: 96 %    Physical Exam:   Physical Exam  Vitals reviewed.   Constitutional:       Appearance: Normal appearance. He is not ill-appearing.   HENT:      Head: Normocephalic and atraumatic.      Mouth/Throat:      Mouth: Mucous membranes are moist.      Pharynx: Oropharynx is clear.   Eyes:      Extraocular Movements: Extraocular movements intact.      Pupils: Pupils are equal, round, and reactive to light.   Neck:      Vascular: No carotid bruit.   Cardiovascular:      Rate and Rhythm: Normal rate and regular rhythm.      Pulses: Normal  pulses.      Heart sounds: Normal heart sounds. No murmur heard.    No gallop.   Pulmonary:      Effort: Pulmonary effort is normal. No respiratory distress.      Breath sounds: Normal breath sounds. No rales.   Abdominal:      General: Bowel sounds are normal. There is no distension.      Palpations: Abdomen is soft.      Tenderness: There is no guarding.   Musculoskeletal:         General: Normal range of motion.      Cervical back: Normal range of motion and neck supple.      Right lower leg: Edema present.      Left lower leg: Edema present.   Skin:     General: Skin is warm and dry.      Capillary Refill: Capillary refill takes less than 2 seconds.      Findings: Bruising present.      Comments: S/P L surgical repair Charcot foot with boot in place     Neurological:      General: No focal deficit present.      Mental Status: He is alert and oriented to person, place, and time.   Psychiatric:         Mood and Affect: Mood normal.         Behavior: Behavior normal.         Thought Content: Thought content normal.         Judgment: Judgment normal.       Results Review:       Results from last 7 days   Lab Units 05/26/23  0531 05/22/23  0540   SODIUM mmol/L 136 136   POTASSIUM mmol/L 4.6 4.3   CHLORIDE mmol/L 100 100   CO2 mmol/L 24.0 27.0   BUN mg/dL 21 18   CREATININE mg/dL 0.94 0.96   GLUCOSE mg/dL 200* 153*   CALCIUM mg/dL 9.2 9.1   BILIRUBIN mg/dL 0.6 0.6   ALK PHOS U/L 100 88   ALT (SGPT) U/L 20 25   AST (SGOT) U/L 23 22     Results from last 7 days   Lab Units 05/26/23  0531 05/22/23  0540   MAGNESIUM mg/dL 1.9 2.0     Results from last 7 days   Lab Units 05/26/23  0530 05/22/23  0540   WBC 10*3/mm3 4.34 4.71   HEMOGLOBIN g/dL 10.8* 10.8*   HEMATOCRIT % 34.4* 34.5*   PLATELETS 10*3/mm3 233 199     Lab Results   Component Value Date    CKTOTAL 119 04/23/2023    CKMB 2.16 04/23/2023    TROPONINI <0.012 11/19/2014    TROPONINT 52 (H) 04/25/2023     CO2   Date Value Ref Range Status   05/26/2023 24.0 22.0 -  29.0 mmol/L Final         Imaging Results (Most Recent)     None         No results found for: ACANTHNAEG, AFBCX, BPERTUSSISCX, BLOODCX  No results found for: BCIDPCR, CXREFLEX, CSFCX, CULTURETIS  No results found for: CULTURES, HSVCX, URCX  No results found for: EYECULTURE, GCCX, HSVCULTURE, LABHSV  No results found for: LEGIONELLA, MRSACX, MUMPSCX, MYCOPLASCX  No results found for: NOCARDIACX, STOOLCX  No results found for: THROATCX, UNSTIMCULT, URINECX, CULTURE, VZVCULTUR  No results found for: VIRALCULTU, WOUNDCX  Medication Reviewed and Will Continue Unless Documented in Plan:   apixaban, 5 mg, Oral, Q12H  ascorbic acid, 1,000 mg, Oral, Nightly  cetirizine, 10 mg, Oral, Daily  vitamin D3, 5,000 Units, Oral, Nightly  docusate sodium, 200 mg, Oral, BID  dronedarone, 400 mg, Oral, Daily With Dinner  fluticasone, 2 spray, Each Nare, Daily  glipizide, 10 mg, Oral, QAM AC  Insulin Aspart, 2-12 Units, Subcutaneous, 4x Daily With Meals & Nightly  magnesium oxide, 400 mg, Oral, BID  metoprolol succinate XL, 25 mg, Oral, BID  senna-docusate sodium, 2 tablet, Oral, BID  sodium bicarbonate, 650 mg, Oral, TID  tamsulosin, 0.4 mg, Oral, BID         •  acetaminophen  •  bisacodyl  •  calcium carbonate  •  dextrose  •  hydrALAZINE  •  hydrOXYzine pamoate  •  muscle rub  •  ondansetron  •  oxyCODONE-acetaminophen  •  simethicone  •  sodium chloride  •  traZODone      Dietary Orders (From admission, onward)     Start     Ordered    05/25/23 1800  DIET MESSAGE Please send extra portion of eggs at breakfast and 2 pc of garcia; Extra portion of meat at lunch and supper  Daily With Breakfast, Lunch & Dinner      Comments: Please send extra portion of eggs at breakfast and 2 pc of garcia;   Extra portion of meat at lunch and supper    05/25/23 1325    05/18/23 1800  DIET MESSAGE PT DISLIKES MILK>>>>> No coffee... No soup No orange juice/fruit juices.  Daily With Breakfast, Lunch & Dinner      Comments: PT DISLIKES MILK>>>>>  No  coffee...  No soup  No orange juice/fruit juices.    05/18/23 1439    05/15/23 1824  Diet: Regular/House Diet, Diabetic Diets; Consistent Carbohydrate; Texture: Regular Texture (IDDSI 7); Fluid Consistency: Thin (IDDSI 0)  Diet Effective Now        References:    Diet Order Crosswalk   Question Answer Comment   Diets: Regular/House Diet    Diets: Diabetic Diets    Diabetic Diet: Consistent Carbohydrate    Texture: Regular Texture (IDDSI 7)    Fluid Consistency: Thin (IDDSI 0)        05/15/23 1827              History, physical exam, assessment and plan may have been partly or fully copied from before, but  changes made to the copied record to reflect care on the date of service. Part of the lab and imaging  reports auto populated and corrected. Some of this note may be an electronic transcription of spoken  language to printed text. This may permit erroneous, or at times, nonsensical words or phrases to be  inadvertently transcribed. Although I have reviewed the note for such errors, some may still exist.      This document has been electronically signed by Charlie Persaud MD on May 27, 2023 10:53 CDT

## 2023-05-28 ENCOUNTER — OUTSIDE FACILITY SERVICE (OUTPATIENT)
Dept: PULMONOLOGY | Facility: CLINIC | Age: 63
End: 2023-05-28

## 2023-05-28 LAB
GLUCOSE BLDC GLUCOMTR-MCNC: 124 MG/DL (ref 70–130)
GLUCOSE BLDC GLUCOMTR-MCNC: 183 MG/DL (ref 70–130)

## 2023-05-28 PROCEDURE — 82948 REAGENT STRIP/BLOOD GLUCOSE: CPT

## 2023-05-28 NOTE — ACP (ADVANCE CARE PLANNING)
[facility] @ [facility]  INPATIENT PROGRESS NOTE    PATIENT NAME: Emre Lisa      PHYSICIAN: Charlie Persaud MD  : 1960        MRN: [id number]  Patient Care Team:  Jamaal Bravo MD as PCP - Marco A Chauhan DPM as Consulting Physician (Podiatry)    Chief Complaint:  Continued antibiotic therapy and medical management  History of Present Illness: Patient is a 62 y/o male with a pmh of type 2 DM, hypertension, CAD, and neuropathy.  He recently underwent surgical repair of Charcot deformity with external fixator stabilization to his left foot related to cellulitis.  He tolerated the surgical procedure well.  He will need IV antibiotic therapy through 23 due to bone/joint infection.  He remains non-weight bearing of left lower extremity.  He tells me the pain to his left foot remains controlled, however, he does have a torn left rotator cuff and arthritis so his hip and shoulder are the source of his pain.  He tells me he is doing well and he has no other complaints or concerns.  I have reviewed the patients most recent labs and diagnostics independently.  Glucose 124, BUN 15, creatinine 0.89, sodium 138, potassium 4.2, chloride 105, co2 24, wbc 5.25, hgb 9.9, hct 30.1, platelets 156.       Assessment      Cellulitis of left foot  Charcot's joint of left foot  Type 2 DM  Arthritis  A-fib  Hypertension  Rheumatoid arthritis  DVT & GI prophylaxis  External fixator removal (05/15/2023)     DVT Prophylaxis: Heparin  GI Prophylaxis: Protonix  Code Status: Full    Plan     -Clinically improving.  -Aggressive therapy recommended.  -Patient getting out of bed to chair as tolerated.  -Continue with anticoagulation.  -Discharge planning in progress.  -Podiatry follow-up appreciated.  -Awaiting insurance approval for ARU placement.  -DVT and GI prophylaxis in place.  -We'll continue monitoring patient in hospital setting and treat patient as  course dictates.  -Please review orders for detailed plan of care.  -For Acute and Chronic medical condition we will continue medications described below in medication section which have been reviewed and we will continue unless changed in plan of care.  -Laboratory and diagnostic studies have been independently reviewed and the reports are reviewed as documented below.      Subjective   Interval History:   Patient Complaints:   Patient seen and examined.  No significant events reported overnight.  Patient denies any complaints.  History taken from:   Patient, patient's chart and nursing staff.    Review of Systems:    Review of Systems   Constitutional: Positive for activity change. Negative for chills and fever.   HENT: Negative for postnasal drip and tinnitus.    Respiratory: Negative for apnea.    Cardiovascular: Negative for chest pain and palpitations.   Gastrointestinal: Negative for abdominal distention, nausea and vomiting.   Musculoskeletal: Positive for arthralgias and myalgias.   Skin: Positive for wound.   Neurological: Negative for tremors and seizures.   Psychiatric/Behavioral: Negative for agitation, confusion and hallucinations.       Objective   Vital Signs:  Temp: 98.0 F         Heart Rate: 78         resp: 19          blood Pressure: 134/76         Pulse Ox: 96 %    Physical Exam:   Physical Exam  Vitals reviewed.   Constitutional:       Appearance: Normal appearance. He is not ill-appearing.   HENT:      Head: Normocephalic and atraumatic.      Mouth/Throat:      Mouth: Mucous membranes are moist.      Pharynx: Oropharynx is clear.   Eyes:      Extraocular Movements: Extraocular movements intact.      Pupils: Pupils are equal, round, and reactive to light.   Neck:      Vascular: No carotid bruit.   Cardiovascular:      Rate and Rhythm: Normal rate and regular rhythm.      Pulses: Normal pulses.      Heart sounds: Normal heart sounds. No murmur heard.    No gallop.   Pulmonary:      Effort:  Pulmonary effort is normal. No respiratory distress.      Breath sounds: Normal breath sounds. No rales.   Abdominal:      General: Bowel sounds are normal. There is no distension.      Palpations: Abdomen is soft.      Tenderness: There is no guarding.   Musculoskeletal:         General: Normal range of motion.      Cervical back: Normal range of motion and neck supple.      Right lower leg: Edema present.      Left lower leg: Edema present.   Skin:     General: Skin is warm and dry.      Capillary Refill: Capillary refill takes less than 2 seconds.      Findings: Bruising present.      Comments: S/P L surgical repair Charcot foot with boot in place     Neurological:      General: No focal deficit present.      Mental Status: He is alert and oriented to person, place, and time.   Psychiatric:         Mood and Affect: Mood normal.         Behavior: Behavior normal.         Thought Content: Thought content normal.         Judgment: Judgment normal.       Results Review:       Results from last 7 days   Lab Units 05/26/23  0531 05/22/23  0540   SODIUM mmol/L 136 136   POTASSIUM mmol/L 4.6 4.3   CHLORIDE mmol/L 100 100   CO2 mmol/L 24.0 27.0   BUN mg/dL 21 18   CREATININE mg/dL 0.94 0.96   GLUCOSE mg/dL 200* 153*   CALCIUM mg/dL 9.2 9.1   BILIRUBIN mg/dL 0.6 0.6   ALK PHOS U/L 100 88   ALT (SGPT) U/L 20 25   AST (SGOT) U/L 23 22     Results from last 7 days   Lab Units 05/26/23  0531 05/22/23  0540   MAGNESIUM mg/dL 1.9 2.0     Results from last 7 days   Lab Units 05/26/23  0530 05/22/23  0540   WBC 10*3/mm3 4.34 4.71   HEMOGLOBIN g/dL 10.8* 10.8*   HEMATOCRIT % 34.4* 34.5*   PLATELETS 10*3/mm3 233 199     Lab Results   Component Value Date    CKTOTAL 119 04/23/2023    CKMB 2.16 04/23/2023    TROPONINI <0.012 11/19/2014    TROPONINT 52 (H) 04/25/2023     CO2   Date Value Ref Range Status   05/26/2023 24.0 22.0 - 29.0 mmol/L Final         Imaging Results (Most Recent)     None         No results found for: ACANTHNAEG,  AFBCX, BPERTUSSISCX, BLOODCX  No results found for: BCIDPCR, CXREFLEX, CSFCX, CULTURETIS  No results found for: CULTURES, HSVCX, URCX  No results found for: EYECULTURE, GCCX, HSVCULTURE, LABHSV  No results found for: LEGIONELLA, MRSACX, MUMPSCX, MYCOPLASCX  No results found for: NOCARDIACX, STOOLCX  No results found for: THROATCX, UNSTIMCULT, URINECX, CULTURE, VZVCULTUR  No results found for: VIRALCULTU, WOUNDCX  Medication Reviewed and Will Continue Unless Documented in Plan:   apixaban, 5 mg, Oral, Q12H  ascorbic acid, 1,000 mg, Oral, Nightly  cetirizine, 10 mg, Oral, Daily  vitamin D3, 5,000 Units, Oral, Nightly  docusate sodium, 200 mg, Oral, BID  dronedarone, 400 mg, Oral, Daily With Dinner  fluticasone, 2 spray, Each Nare, Daily  glipizide, 10 mg, Oral, QAM AC  Insulin Aspart, 2-12 Units, Subcutaneous, 4x Daily With Meals & Nightly  magnesium oxide, 400 mg, Oral, BID  metoprolol succinate XL, 25 mg, Oral, BID  senna-docusate sodium, 2 tablet, Oral, BID  sodium bicarbonate, 650 mg, Oral, TID  tamsulosin, 0.4 mg, Oral, BID         •  acetaminophen  •  bisacodyl  •  calcium carbonate  •  dextrose  •  hydrALAZINE  •  hydrOXYzine pamoate  •  muscle rub  •  ondansetron  •  oxyCODONE-acetaminophen  •  simethicone  •  sodium chloride  •  traZODone      Dietary Orders (From admission, onward)     Start     Ordered    05/25/23 1800  DIET MESSAGE Please send extra portion of eggs at breakfast and 2 pc of garcia; Extra portion of meat at lunch and supper  Daily With Breakfast, Lunch & Dinner      Comments: Please send extra portion of eggs at breakfast and 2 pc of garcia;   Extra portion of meat at lunch and supper    05/25/23 1325    05/18/23 1800  DIET MESSAGE PT DISLIKES MILK>>>>> No coffee... No soup No orange juice/fruit juices.  Daily With Breakfast, Lunch & Dinner      Comments: PT DISLIKES MILK>>>>>  No coffee...  No soup  No orange juice/fruit juices.    05/18/23 1439    05/15/23 1828  Diet: Regular/House Diet,  Diabetic Diets; Consistent Carbohydrate; Texture: Regular Texture (IDDSI 7); Fluid Consistency: Thin (IDDSI 0)  Diet Effective Now        References:    Diet Order Crosswalk   Question Answer Comment   Diets: Regular/House Diet    Diets: Diabetic Diets    Diabetic Diet: Consistent Carbohydrate    Texture: Regular Texture (IDDSI 7)    Fluid Consistency: Thin (IDDSI 0)        05/15/23 1827              History, physical exam, assessment and plan may have been partly or fully copied from before, but  changes made to the copied record to reflect care on the date of service. Part of the lab and imaging  reports auto populated and corrected. Some of this note may be an electronic transcription of spoken  language to printed text. This may permit erroneous, or at times, nonsensical words or phrases to be  inadvertently transcribed. Although I have reviewed the note for such errors, some may still exist.      This document has been electronically signed by Charlie Persaud MD on May 28, 2023 11:56 CDT

## 2023-05-29 ENCOUNTER — OUTSIDE FACILITY SERVICE (OUTPATIENT)
Dept: PULMONOLOGY | Facility: CLINIC | Age: 63
End: 2023-05-29

## 2023-05-29 LAB
ALBUMIN SERPL-MCNC: 3.8 G/DL (ref 3.5–5.2)
ALBUMIN/GLOB SERPL: 1.1 G/DL
ALP SERPL-CCNC: 114 U/L (ref 39–117)
ALT SERPL W P-5'-P-CCNC: 22 U/L (ref 1–41)
ANION GAP SERPL CALCULATED.3IONS-SCNC: 10 MMOL/L (ref 5–15)
AST SERPL-CCNC: 22 U/L (ref 1–40)
BILIRUB SERPL-MCNC: 0.6 MG/DL (ref 0–1.2)
BUN SERPL-MCNC: 27 MG/DL (ref 8–23)
BUN/CREAT SERPL: 22.7 (ref 7–25)
CALCIUM SPEC-SCNC: 9.1 MG/DL (ref 8.6–10.5)
CHLORIDE SERPL-SCNC: 103 MMOL/L (ref 98–107)
CO2 SERPL-SCNC: 26 MMOL/L (ref 22–29)
CREAT SERPL-MCNC: 1.19 MG/DL (ref 0.76–1.27)
DEPRECATED RDW RBC AUTO: 46 FL (ref 37–54)
EGFRCR SERPLBLD CKD-EPI 2021: 69.5 ML/MIN/1.73
ERYTHROCYTE [DISTWIDTH] IN BLOOD BY AUTOMATED COUNT: 14.4 % (ref 12.3–15.4)
GLOBULIN UR ELPH-MCNC: 3.4 GM/DL
GLUCOSE BLDC GLUCOMTR-MCNC: 147 MG/DL (ref 70–130)
GLUCOSE BLDC GLUCOMTR-MCNC: 147 MG/DL (ref 70–130)
GLUCOSE BLDC GLUCOMTR-MCNC: 188 MG/DL (ref 70–130)
GLUCOSE SERPL-MCNC: 168 MG/DL (ref 65–99)
HCT VFR BLD AUTO: 35.5 % (ref 37.5–51)
HGB BLD-MCNC: 11.2 G/DL (ref 13–17.7)
MAGNESIUM SERPL-MCNC: 1.9 MG/DL (ref 1.6–2.4)
MCH RBC QN AUTO: 27.7 PG (ref 26.6–33)
MCHC RBC AUTO-ENTMCNC: 31.5 G/DL (ref 31.5–35.7)
MCV RBC AUTO: 87.9 FL (ref 79–97)
PLATELET # BLD AUTO: 195 10*3/MM3 (ref 140–450)
PMV BLD AUTO: 10.2 FL (ref 6–12)
POTASSIUM SERPL-SCNC: 4.8 MMOL/L (ref 3.5–5.2)
PROT SERPL-MCNC: 7.2 G/DL (ref 6–8.5)
RBC # BLD AUTO: 4.04 10*6/MM3 (ref 4.14–5.8)
SODIUM SERPL-SCNC: 139 MMOL/L (ref 136–145)
WBC NRBC COR # BLD: 4.93 10*3/MM3 (ref 3.4–10.8)

## 2023-05-29 PROCEDURE — 83735 ASSAY OF MAGNESIUM: CPT | Performed by: INTERNAL MEDICINE

## 2023-05-29 PROCEDURE — 80053 COMPREHEN METABOLIC PANEL: CPT | Performed by: INTERNAL MEDICINE

## 2023-05-29 PROCEDURE — 97110 THERAPEUTIC EXERCISES: CPT

## 2023-05-29 PROCEDURE — 85027 COMPLETE CBC AUTOMATED: CPT | Performed by: INTERNAL MEDICINE

## 2023-05-29 PROCEDURE — 97542 WHEELCHAIR MNGMENT TRAINING: CPT

## 2023-05-29 PROCEDURE — 82948 REAGENT STRIP/BLOOD GLUCOSE: CPT

## 2023-05-29 PROCEDURE — 97535 SELF CARE MNGMENT TRAINING: CPT

## 2023-05-29 PROCEDURE — 97530 THERAPEUTIC ACTIVITIES: CPT

## 2023-05-29 NOTE — ACP (ADVANCE CARE PLANNING)
[facility] @ [facility]  INPATIENT PROGRESS NOTE    PATIENT NAME: Emre Lisa      PHYSICIAN: Charlie Persaud MD  : 1960        MRN: [id number]  Patient Care Team:  Jamaal Bravo MD as PCP - Marco A Chauhan DPM as Consulting Physician (Podiatry)    Chief Complaint:  Continued antibiotic therapy and medical management  History of Present Illness: Patient is a 62 y/o male with a pmh of type 2 DM, hypertension, CAD, and neuropathy.  He recently underwent surgical repair of Charcot deformity with external fixator stabilization to his left foot related to cellulitis.  He tolerated the surgical procedure well.  He will need IV antibiotic therapy through 23 due to bone/joint infection.  He remains non-weight bearing of left lower extremity.  He tells me the pain to his left foot remains controlled, however, he does have a torn left rotator cuff and arthritis so his hip and shoulder are the source of his pain.  He tells me he is doing well and he has no other complaints or concerns.  I have reviewed the patients most recent labs and diagnostics independently.  Glucose 124, BUN 15, creatinine 0.89, sodium 138, potassium 4.2, chloride 105, co2 24, wbc 5.25, hgb 9.9, hct 30.1, platelets 156.       Assessment      Cellulitis of left foot  Charcot's joint of left foot  Type 2 DM  Arthritis  A-fib  Hypertension  Rheumatoid arthritis  DVT & GI prophylaxis  External fixator removal (05/15/2023)     DVT Prophylaxis: Heparin  GI Prophylaxis: Protonix  Code Status: Full    Plan     -Continue with oral anticoagulation.  -Clinically improving.  -PT/OT as tolerated.  -Get a bed to chair as tolerated.  -Awaiting ARU approval from insurance standpoint.  -Discharge planning in progress.  -DVT and GI prophylaxis in place.  -We'll continue monitoring patient in hospital setting and treat patient as course dictates.  -Please review orders for detailed plan  of care.  -For Acute and Chronic medical condition we will continue medications described below in medication section which have been reviewed and we will continue unless changed in plan of care.  -Laboratory and diagnostic studies have been independently reviewed and the reports are reviewed as documented below.      Subjective   Interval History:   Patient Complaints:   Patient seen and examined during rounds.  Patient denies any complaints and is in better mood.  History taken from:   Patient, patient's chart and nursing staff.    Review of Systems:    Review of Systems   Constitutional: Positive for activity change. Negative for chills and fever.   HENT: Negative for postnasal drip and tinnitus.    Respiratory: Negative for apnea.    Cardiovascular: Negative for chest pain and palpitations.   Gastrointestinal: Negative for abdominal distention, nausea and vomiting.   Musculoskeletal: Positive for arthralgias and myalgias.   Skin: Positive for wound.   Neurological: Negative for tremors and seizures.   Psychiatric/Behavioral: Negative for agitation, confusion and hallucinations.       Objective   Vital Signs:  Temp: 98.4 F         Heart Rate: 81         resp: 18          blood Pressure: 110/58         Pulse Ox: 93 %    Physical Exam:   Physical Exam  Vitals reviewed.   Constitutional:       Appearance: Normal appearance. He is not ill-appearing.   HENT:      Head: Normocephalic and atraumatic.      Mouth/Throat:      Mouth: Mucous membranes are moist.      Pharynx: Oropharynx is clear.   Eyes:      Extraocular Movements: Extraocular movements intact.      Pupils: Pupils are equal, round, and reactive to light.   Neck:      Vascular: No carotid bruit.   Cardiovascular:      Rate and Rhythm: Normal rate and regular rhythm.      Pulses: Normal pulses.      Heart sounds: Normal heart sounds. No murmur heard.    No gallop.   Pulmonary:      Effort: Pulmonary effort is normal. No respiratory distress.      Breath  sounds: Normal breath sounds. No rales.   Abdominal:      General: Bowel sounds are normal. There is no distension.      Palpations: Abdomen is soft.      Tenderness: There is no guarding.   Musculoskeletal:         General: Normal range of motion.      Cervical back: Normal range of motion and neck supple.      Right lower leg: Edema present.      Left lower leg: Edema present.   Skin:     General: Skin is warm and dry.      Capillary Refill: Capillary refill takes less than 2 seconds.      Findings: Bruising present.      Comments: S/P L surgical repair Charcot foot with boot in place     Neurological:      General: No focal deficit present.      Mental Status: He is alert and oriented to person, place, and time.   Psychiatric:         Mood and Affect: Mood normal.         Behavior: Behavior normal.         Thought Content: Thought content normal.         Judgment: Judgment normal.       Results Review:       Results from last 7 days   Lab Units 05/29/23  0549 05/26/23  0531   SODIUM mmol/L 139 136   POTASSIUM mmol/L 4.8 4.6   CHLORIDE mmol/L 103 100   CO2 mmol/L 26.0 24.0   BUN mg/dL 27* 21   CREATININE mg/dL 1.19 0.94   GLUCOSE mg/dL 168* 200*   CALCIUM mg/dL 9.1 9.2   BILIRUBIN mg/dL 0.6 0.6   ALK PHOS U/L 114 100   ALT (SGPT) U/L 22 20   AST (SGOT) U/L 22 23     Results from last 7 days   Lab Units 05/29/23  0549 05/26/23  0531   MAGNESIUM mg/dL 1.9 1.9     Results from last 7 days   Lab Units 05/29/23  0549 05/26/23  0530   WBC 10*3/mm3 4.93 4.34   HEMOGLOBIN g/dL 11.2* 10.8*   HEMATOCRIT % 35.5* 34.4*   PLATELETS 10*3/mm3 195 233     Lab Results   Component Value Date    CKTOTAL 119 04/23/2023    CKMB 2.16 04/23/2023    TROPONINI <0.012 11/19/2014    TROPONINT 52 (H) 04/25/2023     CO2   Date Value Ref Range Status   05/29/2023 26.0 22.0 - 29.0 mmol/L Final         Imaging Results (Most Recent)     None         No results found for: ACANTHNAEG, AFBCX, BPERTUSSISCX, BLOODCX  No results found for: BCIDPCR,  CXREFLEX, CSFCX, CULTURETIS  No results found for: CULTURES, HSVCX, URCX  No results found for: EYECULTURE, GCCX, HSVCULTURE, LABHSV  No results found for: LEGIONELLA, MRSACX, MUMPSCX, MYCOPLASCX  No results found for: NOCARDIACX, STOOLCX  No results found for: THROATCX, UNSTIMCULT, URINECX, CULTURE, VZVCULTUR  No results found for: VIRALCULTU, WOUNDCX  Medication Reviewed and Will Continue Unless Documented in Plan:   apixaban, 5 mg, Oral, Q12H  ascorbic acid, 1,000 mg, Oral, Nightly  cetirizine, 10 mg, Oral, Daily  vitamin D3, 5,000 Units, Oral, Nightly  docusate sodium, 200 mg, Oral, BID  dronedarone, 400 mg, Oral, Daily With Dinner  fluticasone, 2 spray, Each Nare, Daily  glipizide, 10 mg, Oral, QAM AC  Insulin Aspart, 2-12 Units, Subcutaneous, 4x Daily With Meals & Nightly  magnesium oxide, 400 mg, Oral, BID  metoprolol succinate XL, 25 mg, Oral, BID  senna-docusate sodium, 2 tablet, Oral, BID  sodium bicarbonate, 650 mg, Oral, TID  tamsulosin, 0.4 mg, Oral, BID         •  acetaminophen  •  bisacodyl  •  calcium carbonate  •  dextrose  •  hydrALAZINE  •  hydrOXYzine pamoate  •  muscle rub  •  ondansetron  •  oxyCODONE-acetaminophen  •  simethicone  •  sodium chloride  •  traZODone      Dietary Orders (From admission, onward)     Start     Ordered    05/25/23 1800  DIET MESSAGE Please send extra portion of eggs at breakfast and 2 pc of garcia; Extra portion of meat at lunch and supper  Daily With Breakfast, Lunch & Dinner      Comments: Please send extra portion of eggs at breakfast and 2 pc of garcia;   Extra portion of meat at lunch and supper    05/25/23 1325    05/18/23 1800  DIET MESSAGE PT DISLIKES MILK>>>>> No coffee... No soup No orange juice/fruit juices.  Daily With Breakfast, Lunch & Dinner      Comments: PT DISLIKES MILK>>>>>  No coffee...  No soup  No orange juice/fruit juices.    05/18/23 1439    05/15/23 1824  Diet: Regular/House Diet, Diabetic Diets; Consistent Carbohydrate; Texture: Regular  Texture (IDDSI 7); Fluid Consistency: Thin (IDDSI 0)  Diet Effective Now        References:    Diet Order Crosswalk   Question Answer Comment   Diets: Regular/House Diet    Diets: Diabetic Diets    Diabetic Diet: Consistent Carbohydrate    Texture: Regular Texture (IDDSI 7)    Fluid Consistency: Thin (IDDSI 0)        05/15/23 1827              History, physical exam, assessment and plan may have been partly or fully copied from before, but  changes made to the copied record to reflect care on the date of service. Part of the lab and imaging  reports auto populated and corrected. Some of this note may be an electronic transcription of spoken  language to printed text. This may permit erroneous, or at times, nonsensical words or phrases to be  inadvertently transcribed. Although I have reviewed the note for such errors, some may still exist.      This document has been electronically signed by Charlie Persaud MD on May 29, 2023 12:49 CDT

## 2023-05-30 ENCOUNTER — HOSPITAL ENCOUNTER (INPATIENT)
Facility: HOSPITAL | Age: 63
LOS: 4 days | Discharge: ANOTHER HOSPITAL | DRG: 074 | End: 2023-06-03
Attending: ORTHOPAEDIC SURGERY | Admitting: ORTHOPAEDIC SURGERY
Payer: MEDICARE

## 2023-05-30 VITALS — HEART RATE: 88 BPM

## 2023-05-30 DIAGNOSIS — Z74.09 IMPAIRED FUNCTIONAL MOBILITY, BALANCE, GAIT, AND ENDURANCE: ICD-10-CM

## 2023-05-30 DIAGNOSIS — Z78.9 IMPAIRED MOBILITY AND ADLS: ICD-10-CM

## 2023-05-30 DIAGNOSIS — Z74.09 IMPAIRED MOBILITY AND ADLS: ICD-10-CM

## 2023-05-30 DIAGNOSIS — R48.9 SYMBOLIC DYSFUNCTION: ICD-10-CM

## 2023-05-30 PROBLEM — M14.672 CHARCOT'S JOINT OF LEFT FOOT: Chronic | Status: ACTIVE | Noted: 2018-07-24

## 2023-05-30 PROBLEM — E13.40 NEUROPATHY DUE TO SECONDARY DIABETES MELLITUS: Chronic | Status: ACTIVE | Noted: 2023-05-30

## 2023-05-30 PROBLEM — E11.628 TYPE 2 DIABETES MELLITUS WITH SKIN COMPLICATION: Chronic | Status: ACTIVE | Noted: 2017-03-03

## 2023-05-30 LAB
GLUCOSE BLDC GLUCOMTR-MCNC: 130 MG/DL (ref 70–130)
GLUCOSE BLDC GLUCOMTR-MCNC: 145 MG/DL (ref 70–130)
GLUCOSE BLDC GLUCOMTR-MCNC: 151 MG/DL (ref 70–130)

## 2023-05-30 PROCEDURE — 63710000001 INSULIN ASPART PER 5 UNITS: Performed by: ORTHOPAEDIC SURGERY

## 2023-05-30 PROCEDURE — 82948 REAGENT STRIP/BLOOD GLUCOSE: CPT

## 2023-05-30 RX ORDER — ACETAMINOPHEN 325 MG/1
650 TABLET ORAL EVERY 6 HOURS PRN
Status: DISCONTINUED | OUTPATIENT
Start: 2023-05-30 | End: 2023-06-03

## 2023-05-30 RX ORDER — MULTIVIT WITH MINERALS/LUTEIN
1000 TABLET ORAL NIGHTLY
Status: DISCONTINUED | OUTPATIENT
Start: 2023-05-30 | End: 2023-06-03 | Stop reason: HOSPADM

## 2023-05-30 RX ORDER — CETIRIZINE HYDROCHLORIDE 10 MG/1
10 TABLET ORAL DAILY
Status: DISCONTINUED | OUTPATIENT
Start: 2023-05-31 | End: 2023-06-03 | Stop reason: HOSPADM

## 2023-05-30 RX ORDER — OXYCODONE AND ACETAMINOPHEN 7.5; 325 MG/1; MG/1
1 TABLET ORAL EVERY 4 HOURS PRN
Status: DISCONTINUED | OUTPATIENT
Start: 2023-05-30 | End: 2023-06-03 | Stop reason: HOSPADM

## 2023-05-30 RX ORDER — SIMETHICONE 80 MG
80 TABLET,CHEWABLE ORAL EVERY 4 HOURS PRN
Status: DISCONTINUED | OUTPATIENT
Start: 2023-05-30 | End: 2023-06-03 | Stop reason: HOSPADM

## 2023-05-30 RX ORDER — INSULIN ASPART 100 [IU]/ML
2-12 INJECTION, SOLUTION INTRAVENOUS; SUBCUTANEOUS
Status: DISCONTINUED | OUTPATIENT
Start: 2023-05-30 | End: 2023-06-03 | Stop reason: HOSPADM

## 2023-05-30 RX ORDER — DOCUSATE SODIUM 100 MG/1
200 CAPSULE, LIQUID FILLED ORAL 2 TIMES DAILY
Status: DISCONTINUED | OUTPATIENT
Start: 2023-05-30 | End: 2023-05-31

## 2023-05-30 RX ORDER — GLIPIZIDE 10 MG/1
10 TABLET ORAL
Status: DISCONTINUED | OUTPATIENT
Start: 2023-05-31 | End: 2023-06-03 | Stop reason: HOSPADM

## 2023-05-30 RX ORDER — METOPROLOL SUCCINATE 25 MG/1
25 TABLET, EXTENDED RELEASE ORAL 2 TIMES DAILY
Status: DISCONTINUED | OUTPATIENT
Start: 2023-05-30 | End: 2023-06-03 | Stop reason: HOSPADM

## 2023-05-30 RX ORDER — SODIUM BICARBONATE 650 MG/1
650 TABLET ORAL 3 TIMES DAILY
Status: DISCONTINUED | OUTPATIENT
Start: 2023-05-30 | End: 2023-06-03 | Stop reason: HOSPADM

## 2023-05-30 RX ORDER — AMOXICILLIN 250 MG
2 CAPSULE ORAL 2 TIMES DAILY
Status: DISCONTINUED | OUTPATIENT
Start: 2023-05-30 | End: 2023-05-31

## 2023-05-30 RX ORDER — CALCIUM CARBONATE 500 MG/1
2 TABLET, CHEWABLE ORAL 3 TIMES DAILY PRN
Status: DISCONTINUED | OUTPATIENT
Start: 2023-05-30 | End: 2023-05-31

## 2023-05-30 RX ORDER — TAMSULOSIN HYDROCHLORIDE 0.4 MG/1
0.4 CAPSULE ORAL 2 TIMES DAILY
Status: DISCONTINUED | OUTPATIENT
Start: 2023-05-30 | End: 2023-06-03 | Stop reason: HOSPADM

## 2023-05-30 RX ORDER — MAGNESIUM OXIDE 400 MG/1
400 TABLET ORAL 2 TIMES DAILY
Status: DISCONTINUED | OUTPATIENT
Start: 2023-05-30 | End: 2023-06-03 | Stop reason: HOSPADM

## 2023-05-30 RX ORDER — TRAZODONE HYDROCHLORIDE 50 MG/1
50 TABLET ORAL NIGHTLY PRN
Status: DISCONTINUED | OUTPATIENT
Start: 2023-05-30 | End: 2023-06-03 | Stop reason: HOSPADM

## 2023-05-30 RX ORDER — BISACODYL 10 MG
5 SUPPOSITORY, RECTAL RECTAL DAILY PRN
Status: DISCONTINUED | OUTPATIENT
Start: 2023-05-30 | End: 2023-05-31

## 2023-05-30 RX ORDER — FLUTICASONE PROPIONATE 50 MCG
2 SPRAY, SUSPENSION (ML) NASAL DAILY
Status: DISCONTINUED | OUTPATIENT
Start: 2023-05-31 | End: 2023-06-03 | Stop reason: HOSPADM

## 2023-05-30 RX ADMIN — SODIUM BICARBONATE 650 MG: 650 TABLET ORAL at 20:23

## 2023-05-30 RX ADMIN — TAMSULOSIN HYDROCHLORIDE 0.4 MG: 0.4 CAPSULE ORAL at 20:22

## 2023-05-30 RX ADMIN — DOCUSATE SODIUM 50 MG AND SENNOSIDES 8.6 MG 2 TABLET: 8.6; 5 TABLET, FILM COATED ORAL at 20:22

## 2023-05-30 RX ADMIN — APIXABAN 5 MG: 5 TABLET, FILM COATED ORAL at 20:22

## 2023-05-30 RX ADMIN — SODIUM BICARBONATE 650 MG: 650 TABLET ORAL at 17:46

## 2023-05-30 RX ADMIN — METOPROLOL SUCCINATE 25 MG: 25 TABLET, FILM COATED, EXTENDED RELEASE ORAL at 20:22

## 2023-05-30 RX ADMIN — DRONEDARONE 400 MG: 400 TABLET, FILM COATED ORAL at 17:46

## 2023-05-30 RX ADMIN — Medication 400 MG: at 20:22

## 2023-05-30 RX ADMIN — Medication 1000 MG: at 20:23

## 2023-05-30 RX ADMIN — Medication 5000 UNITS: at 20:22

## 2023-05-30 RX ADMIN — INSULIN ASPART 4 UNITS: 100 INJECTION, SOLUTION INTRAVENOUS; SUBCUTANEOUS at 20:23

## 2023-05-30 RX ADMIN — DOCUSATE SODIUM 200 MG: 100 CAPSULE, LIQUID FILLED ORAL at 20:22

## 2023-05-30 NOTE — ACP (ADVANCE CARE PLANNING)
Podiatric Surgery Progress Note    Subjective     Patient resting comfortably in bed.  Nurses at bedside.    Objective     Vital signs in last 24 hours:  Heart Rate:  [72-88] 88    General: alert, appears stated age and cooperative   Neurovascular: SILT  Capillary refill: Normal   Wound: Wound clean and dry no evidence of infection.             Data Review  CBC:  Results from last 7 days   Lab Units 05/29/23  0549   WBC 10*3/mm3 4.93   RBC 10*6/mm3 4.04*   HEMOGLOBIN g/dL 11.2*   HEMATOCRIT % 35.5*   PLATELETS 10*3/mm3 195       Assessment & Plan     Charcot ankle    Patient doing well postoperatively.  Progress to weightbearing in cam boot.  Unna boot left lower extremity for edema control.  Currently patient is being considered for ARU.  All his questions were answered.  Call with questions.     LOS: 0 days     Marco A Thompson DPM    Date: 5/30/2023  Time: 12:59 CDT

## 2023-05-30 NOTE — ACP (ADVANCE CARE PLANNING)
[facility] @ [facility]  INPATIENT PROGRESS NOTE    PATIENT NAME: Emre Lisa      PHYSICIAN: Charlie Persaud MD  : 1960        MRN: [id number]  Patient Care Team:  Jamaal Bravo MD as PCP - Marco A Chauhan DPM as Consulting Physician (Podiatry)    Chief Complaint:  Continued antibiotic therapy and medical management  History of Present Illness: Patient is a 62 y/o male with a pmh of type 2 DM, hypertension, CAD, and neuropathy.  He recently underwent surgical repair of Charcot deformity with external fixator stabilization to his left foot related to cellulitis.  He tolerated the surgical procedure well.  He will need IV antibiotic therapy through 23 due to bone/joint infection.  He remains non-weight bearing of left lower extremity.  He tells me the pain to his left foot remains controlled, however, he does have a torn left rotator cuff and arthritis so his hip and shoulder are the source of his pain.  He tells me he is doing well and he has no other complaints or concerns.  I have reviewed the patients most recent labs and diagnostics independently.  Glucose 124, BUN 15, creatinine 0.89, sodium 138, potassium 4.2, chloride 105, co2 24, wbc 5.25, hgb 9.9, hct 30.1, platelets 156.       Assessment      Cellulitis of left foot  Charcot's joint of left foot  Type 2 DM  Arthritis  A-fib  Hypertension  Rheumatoid arthritis  DVT & GI prophylaxis  External fixator removal (05/15/2023)     DVT Prophylaxis: Heparin  GI Prophylaxis: Protonix  Code Status: Full    Plan     -Continue with oral anticoagulation.  -Clinically improving.  -PT/OT as tolerated.  -Get a bed to chair as tolerated.  -Plans to discharge to ARU.  Possibly today  -Discharge planning in progress.  -DVT and GI prophylaxis in place.  -We'll continue monitoring patient in hospital setting and treat patient as course dictates.  -Please review orders for detailed plan of  care.  -For Acute and Chronic medical condition we will continue medications described below in medication section which have been reviewed and we will continue unless changed in plan of care.  -Laboratory and diagnostic studies have been independently reviewed and the reports are reviewed as documented below.      Subjective   Interval History:   Patient Complaints:   Patient seen and examined during rounds.  Resting comfortably in bed.  Eager to discharge.  History taken from:   Patient, patient's chart and nursing staff.    Review of Systems:    Review of Systems   Constitutional: Positive for activity change. Negative for chills and fever.   HENT: Negative for postnasal drip and tinnitus.    Respiratory: Negative for apnea.    Cardiovascular: Negative for chest pain and palpitations.   Gastrointestinal: Negative for abdominal distention, nausea and vomiting.   Musculoskeletal: Positive for arthralgias and myalgias.   Skin: Positive for wound.   Neurological: Negative for tremors and seizures.   Psychiatric/Behavioral: Negative for agitation, confusion and hallucinations.       Objective   Vital Signs:  Temp: 98.1 F         Heart Rate: 78         Resp: 18          Blood Pressure: 118/79         Pulse Ox: 96%    Physical Exam:   Physical Exam  Vitals reviewed.   Constitutional:       Appearance: Normal appearance. He is not ill-appearing.   HENT:      Head: Normocephalic and atraumatic.      Mouth/Throat:      Mouth: Mucous membranes are moist.      Pharynx: Oropharynx is clear.   Eyes:      Extraocular Movements: Extraocular movements intact.      Pupils: Pupils are equal, round, and reactive to light.   Neck:      Vascular: No carotid bruit.   Cardiovascular:      Rate and Rhythm: Normal rate and regular rhythm.      Pulses: Normal pulses.      Heart sounds: Normal heart sounds. No murmur heard.    No gallop.   Pulmonary:      Effort: Pulmonary effort is normal. No respiratory distress.      Breath sounds:  Normal breath sounds. No rales.   Abdominal:      General: Bowel sounds are normal. There is no distension.      Palpations: Abdomen is soft.      Tenderness: There is no guarding.   Musculoskeletal:         General: Normal range of motion.      Cervical back: Normal range of motion and neck supple.      Right lower leg: Edema present.      Left lower leg: Edema present.   Skin:     General: Skin is warm and dry.      Capillary Refill: Capillary refill takes less than 2 seconds.      Findings: Bruising present.      Comments: S/P L surgical repair Charcot foot with boot in place     Neurological:      General: No focal deficit present.      Mental Status: He is alert and oriented to person, place, and time.   Psychiatric:         Mood and Affect: Mood normal.         Behavior: Behavior normal.         Thought Content: Thought content normal.         Judgment: Judgment normal.       Results Review:       Results from last 7 days   Lab Units 05/29/23  0549 05/26/23  0531   SODIUM mmol/L 139 136   POTASSIUM mmol/L 4.8 4.6   CHLORIDE mmol/L 103 100   CO2 mmol/L 26.0 24.0   BUN mg/dL 27* 21   CREATININE mg/dL 1.19 0.94   GLUCOSE mg/dL 168* 200*   CALCIUM mg/dL 9.1 9.2   BILIRUBIN mg/dL 0.6 0.6   ALK PHOS U/L 114 100   ALT (SGPT) U/L 22 20   AST (SGOT) U/L 22 23     Results from last 7 days   Lab Units 05/29/23  0549 05/26/23  0531   MAGNESIUM mg/dL 1.9 1.9     Results from last 7 days   Lab Units 05/29/23  0549 05/26/23  0530   WBC 10*3/mm3 4.93 4.34   HEMOGLOBIN g/dL 11.2* 10.8*   HEMATOCRIT % 35.5* 34.4*   PLATELETS 10*3/mm3 195 233     Lab Results   Component Value Date    CKTOTAL 119 04/23/2023    CKMB 2.16 04/23/2023    TROPONINI <0.012 11/19/2014    TROPONINT 52 (H) 04/25/2023     CO2   Date Value Ref Range Status   05/29/2023 26.0 22.0 - 29.0 mmol/L Final         Imaging Results (Most Recent)     None         No results found for: ACANTHNAEG, AFBCX, BPERTUSSISCX, BLOODCX  No results found for: BCIDPCR, CXREFLEX,  CSFCX, CULTURETIS  No results found for: CULTURES, HSVCX, URCX  No results found for: EYECULTURE, GCCX, HSVCULTURE, LABHSV  No results found for: LEGIONELLA, MRSACX, MUMPSCX, MYCOPLASCX  No results found for: NOCARDIACX, STOOLCX  No results found for: THROATCX, UNSTIMCULT, URINECX, CULTURE, VZVCULTUR  No results found for: VIRALCULTU, WOUNDCX  Medication Reviewed and Will Continue Unless Documented in Plan:   apixaban, 5 mg, Oral, Q12H  ascorbic acid, 1,000 mg, Oral, Nightly  cetirizine, 10 mg, Oral, Daily  vitamin D3, 5,000 Units, Oral, Nightly  docusate sodium, 200 mg, Oral, BID  dronedarone, 400 mg, Oral, Daily With Dinner  fluticasone, 2 spray, Each Nare, Daily  glipizide, 10 mg, Oral, QAM AC  Insulin Aspart, 2-12 Units, Subcutaneous, 4x Daily With Meals & Nightly  magnesium oxide, 400 mg, Oral, BID  metoprolol succinate XL, 25 mg, Oral, BID  senna-docusate sodium, 2 tablet, Oral, BID  sodium bicarbonate, 650 mg, Oral, TID  tamsulosin, 0.4 mg, Oral, BID         •  acetaminophen  •  bisacodyl  •  calcium carbonate  •  dextrose  •  hydrALAZINE  •  hydrOXYzine pamoate  •  muscle rub  •  ondansetron  •  oxyCODONE-acetaminophen  •  simethicone  •  sodium chloride  •  traZODone      Dietary Orders (From admission, onward)     Start     Ordered    05/25/23 1800  DIET MESSAGE Please send extra portion of eggs at breakfast and 2 pc of garcia; Extra portion of meat at lunch and supper  Daily With Breakfast, Lunch & Dinner      Comments: Please send extra portion of eggs at breakfast and 2 pc of garcia;   Extra portion of meat at lunch and supper    05/25/23 1325    05/18/23 1800  DIET MESSAGE PT DISLIKES MILK>>>>> No coffee... No soup No orange juice/fruit juices.  Daily With Breakfast, Lunch & Dinner      Comments: PT DISLIKES MILK>>>>>  No coffee...  No soup  No orange juice/fruit juices.    05/18/23 1439    05/15/23 1824  Diet: Regular/House Diet, Diabetic Diets; Consistent Carbohydrate; Texture: Regular Texture (IDDSI  7); Fluid Consistency: Thin (IDDSI 0)  Diet Effective Now        References:    Diet Order Crosswalk   Question Answer Comment   Diets: Regular/House Diet    Diets: Diabetic Diets    Diabetic Diet: Consistent Carbohydrate    Texture: Regular Texture (IDDSI 7)    Fluid Consistency: Thin (IDDSI 0)        05/15/23 1827              History, physical exam, assessment and plan may have been partly or fully copied from before, but  changes made to the copied record to reflect care on the date of service. Part of the lab and imaging  reports auto populated and corrected. Some of this note may be an electronic transcription of spoken  language to printed text. This may permit erroneous, or at times, nonsensical words or phrases to be  inadvertently transcribed. Although I have reviewed the note for such errors, some may still exist.      This document has been electronically signed by Charlie Persaud MD on May 30, 2023 08:51 CDT

## 2023-05-30 NOTE — H&P
HISTORY AND PHYSICAL    Admit date: 05/30/23     Chief complaint: Charcot foot due to diabetes longstanding insulin-dependent neurological complications    History of present illness (4):  Patient is a 63 y.o. male presents with patient came in apparently with a Charcot foot and had a [place] device applied once reduction was achieved underwent operative open reduction internal fixation.  He was placed on heparin and had allergic reaction liz failure and required cardiac pacemaker with defibrillator pacemaker and pulmonary support subsequent resolved heparin was stopped course and he was placed on Eliquis and placed on LTAC for long-term treatment of his foot problem neurologically Dr. Thompson came in today and said he can start to bear weight we will charge him from LTAC and readmit ARU unit in preparation for discharge home    Personal/Family/Social History: Is with his wife has knee scooter is at home very motivated to get home  Past Medical History:   Diagnosis Date   • Arthritis    • Atrial fibrillation    • Diabetes mellitus    • Diabetic foot ulcer associated with type 2 diabetes mellitus     left great toe   • Hallux malleus of left foot    • Hammer toe    • Hypertension    • MI (myocardial infarction)    • Neuropathy in diabetes    • Onychomycosis    • Presence of biventricular cardiac pacemaker    • Rheumatoid arthritis      Past Surgical History:   Procedure Laterality Date   • AMPUTATION DIGIT Right 03/05/2017    Procedure: RIGHT AMPUTATION TRANSMETATRASAL , RECESSION GASTROCNEMOUS;  Surgeon: Marco A Thompson DPM;  Location: [facility];  Service:    • ANKLE FUSION Left 04/13/2023    Procedure: REDUCTION OF LEFT ANKLE DEFORMITY WITH APPLICATION OF EXTERNAL FIXATOR;  Surgeon: Marco A Thompson DPM;  Location: [facility];  Service: Podiatry;  Laterality: Left;   • CARDIAC CATHETERIZATION     • CARDIAC DEFIBRILLATOR PLACEMENT  2010, 2016   • CARPAL TUNNEL RELEASE  2015    elbow and wrist left arm   • FOOT FUSION  Left 05/15/2023    Procedure: REMOVAL OF EXTERNAL FIXATOR LEFT LOWER EXTREMITY WITH TIBIAL TALOCALCANEAL ARTHRODESIS  AND ALL INDICATED PROCEDURES;  Surgeon: Marco A Thompson DPM;  Location: [facility] OR;  Service: Podiatry;  Laterality: Left;   • FOOT IRRIGATION, DEBRIDEMENT AND REPAIR Left 03/07/2018    Procedure: FOOT IRRIGATION, DEBRIDEMENT AND REPAIR;  Surgeon: Marco A Thompson DPM;  Location: [facility] OR;  Service:    • FOOT IRRIGATION, DEBRIDEMENT AND REPAIR Left 03/10/2018    Procedure: FOOT IRRIGATION, DEBRIDEMENT AND REPAIR;  Surgeon: Marco A Thompson DPM;  Location: [facility] OR;  Service: Podiatry   • FOOT WOUND CLOSURE Right 03/02/2017    Procedure: INCISION AND DRAINAGE, RIGHT FOOT;  Surgeon: Tung Rosenthal DPM;  Location: [facility] OR;  Service:    • HAMMER TOE REPAIR Left 02/15/2018    Procedure: HAMMERTOE CORRECTION LEFT SECOND, THIRD AND FOURTH TOES AND HALLUX INTERPHALANGEAL JOINT ARTHRODESIS LEFT FOOT (Micro Asnis, 4.0 & 5.0 Asnis)      (c-arm);  Surgeon: Marco A Thompson DPM;  Location: [facility] OR;  Service:    • HARDWARE REMOVAL Left 03/05/2018    Procedure: ANKLE/FOOT HARDWARE REMOVAL;  Surgeon: Marco A Thompson DPM;  Location: [facility] OR;  Service:    • INCISION AND DRAINAGE LEG Left 03/05/2018    Procedure: INCISION AND DRAINAGE LOWER EXTREMITY;  Surgeon: Marco A Thompson DPM;  Location: [facility] OR;  Service:    • PLANTAR FASCIA RELEASE Left 11/21/2019    Procedure: PLANTAR PLANING LEFT FOOT AND ALL OTHER INDICATED PROCEDURES;  Surgeon: Marco A Thompson DPM;  Location: [facility] OR;  Service: Podiatry   • TOE AMPUTATION Right 2016   • TONSILECTOMY, ADENOIDECTOMY, BILATERAL MYRINGOTOMY AND TUBES      age 25     Family History   Problem Relation Age of Onset   • Diabetes Father    • Stroke Father    • No Known Problems Maternal Grandmother    • No Known Problems Maternal Grandfather    • No Known Problems Paternal Grandmother    • No Known Problems Paternal Grandfather    • No Known Problems Daughter    • No  Known Problems Daughter    • No Known Problems Son    • Heart disease Other    • Hypertension Other      Social History     Tobacco Use   • Smoking status: Never   • Smokeless tobacco: Never   Vaping Use   • Vaping Use: Never used   Substance Use Topics   • Alcohol use: Yes     Comment: social, once every three months   • Drug use: No       Review of Systems:(detailed)    Constitutional: Denies at present time has no acute active ENT ocular problems     HENT:      Eyes: Not know when the last time his eyes were checked by an ophthalmologist or optometrist    Respiratory: Respiratory failure in the past    Cardiovascular: MRI in the past with no angina at this time he has a defibrillator pacemaker    Gastrointestinal: Lost recently 40 pounds while he has been ill    Endocrine: Standing diabetes with insulin dependency with severe morbid obesity    Genitourinary:       Musculoskeletal: Toe amputations transmetatarsal on the left foot because of osteomyelitis diabetic foot ulcer    Skin:     Allergic/Immunologic:    Allergies   Allergen Reactions   • Heparin Other (See Comments)     Heparin induce thrombocytopenia    • Januvia [Sitagliptin] Hives   • Lipitor [Atorvastatin] Other (See Comments)     Muscle Cramps...   • Shellfish Allergy Swelling and Other (See Comments)     Age 13 this occurred doesn't remember what he ate swelled lips and face   • Sulfa Antibiotics Rash     Pt was age of 4 when he was told by his family he had a reaction, pt is unsure        Neurological: Alert and oriented    Hematological:     Psychiatric: No psychiatric    [x]  All other systems reviewed and are negative      Prior to Admission medications    Medication Sig Start Date End Date Taking? Authorizing Provider   ascorbic acid (VITAMIN C) 1000 MG tablet Take 1 tablet by mouth Every Night. 7/19/21  Yes Provider, MD Herve   Cholecalciferol 125 MCG (5000 UT) tablet Take 1 tablet by mouth Every Night. 7/19/21  Yes Provider, Historical,  MD   dronedarone (MULTAQ) 400 MG tablet Take 1 tablet by mouth Every Night.   Yes Herve Cr MD   fluticasone (FLONASE) 50 MCG/ACT nasal spray 2 sprays into the nostril(s) as directed by provider As Needed for Rhinitis.   Yes Herve Cr MD   magnesium oxide (MAG-OX) 400 MG tablet Take 1 tablet by mouth 2 (Two) Times a Day. 11/25/21  Yes Herve Cr MD   metoprolol succinate XL (TOPROL-XL) 25 MG 24 hr tablet Take 1 tablet by mouth 2 (Two) Times a Day. 0.5 tablet   Yes Herve rC MD   multivitamin (THERAGRAN) tablet tablet Take  by mouth Every Night.   Yes Herve Cr MD   Bacillus Coagulans-Inulin (Probiotic) 1-250 BILLION-MG capsule Take 1 capsule by mouth 2 (Two) Times a Day.  Patient not taking: Reported on 5/30/2023 1/27/23   Lito Rico APRN   Blood Glucose Monitoring Suppl (ONE TOUCH ULTRA 2) w/Device kit  10/18/21   Herve Cr MD   clindamycin (CLEOCIN) 300 MG capsule Take 1 capsule by mouth 3 (Three) Times a Day.  Patient not taking: Reported on 5/30/2023 4/5/23   Lito Rico APRN   fexofenadine (ALLEGRA) 180 MG tablet Take 1 tablet by mouth Every Night. 7/19/21   Herve Cr MD   furosemide (Lasix) 20 MG tablet Take 1 tablet by mouth Daily.  Patient not taking: Reported on 5/30/2023 4/11/23   Lito Rico APRN   glimepiride (AMARYL) 4 MG tablet Take 1 tablet by mouth Every Night. 7/19/21   Herve Cr MD   losartan (COZAAR) 25 MG tablet Take 1 tablet by mouth Daily. 7/19/21   Herve Cr MD   metFORMIN (GLUCOPHAGE) 1000 MG tablet Take 1 tablet by mouth 2 (Two) Times a Day With Meals. 7/19/21   Herve Cr MD   ONE TOUCH ULTRA TEST test strip USE TO TEST BLOOD SUGAR THREE TIMES A DAY 8/20/16   Herve Cr MD   Zinc 50 MG tablet Take 1 tablet by mouth Every Night.    Herve Cr MD     Allergies   Allergen Reactions   • Heparin Other (See Comments)     Heparin induce  thrombocytopenia    • Januvia [Sitagliptin] Hives   • Lipitor [Atorvastatin] Other (See Comments)     Muscle Cramps...   • Shellfish Allergy Swelling and Other (See Comments)     Age 13 this occurred doesn't remember what he ate swelled lips and face   • Sulfa Antibiotics Rash     Pt was age of 4 when he was told by his family he had a reaction, pt is unsure          Vital Signs  Temp:  [96.8 °F (36 °C)] 96.8 °F (36 °C)  Heart Rate:  [72-97] 97  Resp:  [16] 16  BP: (132)/(78) 132/78    Physical Exam:    Constitutional: Oriented to time place and person     HENT: Negative    Eyes: PERRL reaction metrical    Neck: No carotid bruits soft subtle no masses    Cardiovascular: Regular sinus rhythm no murmurs    Pulmonary: Clear to auscultation no wheezes or rales    Abdominal: Large portly bowel sounds are absent     Genitourinary/Anorectal: Deferred      Musculoskeletal: Has metatarsal amputation on the right foot left foot fiberglass cast dressing and was not removed this is the foot with a Charcot deformity underwent corrective osteotomies and treatment per Dr. Thompson we will allow him to exchange in due to changes the dressings    Skin:     Neurological: Creased sensibility in the both lower extremities    Psychiatric/Behavioral: No evidence of depression or anxiety    Results Review:   Lab Results (last 24 hours)     ** No results found for the last 24 hours. **        Imaging Results (Last 24 Hours)     ** No results found for the last 24 hours. **          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 salting in a Charcot foot post surgery with multiple complications including respiratory failure cardiac arrhythmia  Active Problems: Conditions under treatment and evaluation    Type 2 diabetes mellitus with skin complication    Charcot's joint of left foot    Atrial fibrillation    Presence of biventricular cardiac pacemaker    Hypertension           Javi Alfaro,  MD  05/30/23  16:29 CDT            This document has been electronically signed by Javi Alfaro MD on May 30, 2023 16:29 CDT      Part of this note may be an electronic transcription/translation of spoken language to printed text using the Dragon Dictation System.

## 2023-05-31 PROBLEM — Z78.9 MEDICALLY COMPLEX PATIENT: Status: ACTIVE | Noted: 2023-05-31

## 2023-05-31 LAB
GLUCOSE BLDC GLUCOMTR-MCNC: 147 MG/DL (ref 70–130)
GLUCOSE BLDC GLUCOMTR-MCNC: 158 MG/DL (ref 70–130)
GLUCOSE BLDC GLUCOMTR-MCNC: 163 MG/DL (ref 70–130)
GLUCOSE BLDC GLUCOMTR-MCNC: 182 MG/DL (ref 70–130)

## 2023-05-31 PROCEDURE — 63710000001 INSULIN ASPART PER 5 UNITS: Performed by: ORTHOPAEDIC SURGERY

## 2023-05-31 PROCEDURE — 97166 OT EVAL MOD COMPLEX 45 MIN: CPT

## 2023-05-31 PROCEDURE — 82948 REAGENT STRIP/BLOOD GLUCOSE: CPT

## 2023-05-31 PROCEDURE — 97535 SELF CARE MNGMENT TRAINING: CPT

## 2023-05-31 PROCEDURE — 97530 THERAPEUTIC ACTIVITIES: CPT

## 2023-05-31 PROCEDURE — 97162 PT EVAL MOD COMPLEX 30 MIN: CPT

## 2023-05-31 PROCEDURE — 92523 SPEECH SOUND LANG COMPREHEN: CPT | Performed by: SPEECH-LANGUAGE PATHOLOGIST

## 2023-05-31 RX ORDER — ACETAMINOPHEN 325 MG/1
650 TABLET ORAL EVERY 6 HOURS PRN
Status: DISCONTINUED | OUTPATIENT
Start: 2023-05-31 | End: 2023-05-31 | Stop reason: SDUPTHER

## 2023-05-31 RX ORDER — ONDANSETRON 4 MG/1
4 TABLET, FILM COATED ORAL EVERY 6 HOURS PRN
Status: DISCONTINUED | OUTPATIENT
Start: 2023-05-31 | End: 2023-06-03 | Stop reason: HOSPADM

## 2023-05-31 RX ORDER — CHOLECALCIFEROL (VITAMIN D3) 125 MCG
5 CAPSULE ORAL NIGHTLY PRN
Status: DISCONTINUED | OUTPATIENT
Start: 2023-05-31 | End: 2023-06-03 | Stop reason: HOSPADM

## 2023-05-31 RX ORDER — CALCIUM CARBONATE 500 MG/1
2 TABLET, CHEWABLE ORAL 2 TIMES DAILY PRN
Status: DISCONTINUED | OUTPATIENT
Start: 2023-05-31 | End: 2023-06-03 | Stop reason: HOSPADM

## 2023-05-31 RX ORDER — BISACODYL 10 MG
10 SUPPOSITORY, RECTAL RECTAL DAILY PRN
Status: DISCONTINUED | OUTPATIENT
Start: 2023-05-31 | End: 2023-06-03 | Stop reason: HOSPADM

## 2023-05-31 RX ORDER — DOCUSATE SODIUM 100 MG/1
100 CAPSULE, LIQUID FILLED ORAL 2 TIMES DAILY
Status: DISCONTINUED | OUTPATIENT
Start: 2023-05-31 | End: 2023-06-03 | Stop reason: HOSPADM

## 2023-05-31 RX ADMIN — Medication 1000 MG: at 20:17

## 2023-05-31 RX ADMIN — METOPROLOL SUCCINATE 25 MG: 25 TABLET, FILM COATED, EXTENDED RELEASE ORAL at 08:07

## 2023-05-31 RX ADMIN — INSULIN ASPART 2 UNITS: 100 INJECTION, SOLUTION INTRAVENOUS; SUBCUTANEOUS at 06:40

## 2023-05-31 RX ADMIN — APIXABAN 5 MG: 5 TABLET, FILM COATED ORAL at 08:07

## 2023-05-31 RX ADMIN — Medication 400 MG: at 08:07

## 2023-05-31 RX ADMIN — GLIPIZIDE 10 MG: 10 TABLET ORAL at 06:40

## 2023-05-31 RX ADMIN — ACETAMINOPHEN 650 MG: 325 TABLET, FILM COATED ORAL at 15:06

## 2023-05-31 RX ADMIN — TAMSULOSIN HYDROCHLORIDE 0.4 MG: 0.4 CAPSULE ORAL at 20:16

## 2023-05-31 RX ADMIN — INSULIN ASPART 2 UNITS: 100 INJECTION, SOLUTION INTRAVENOUS; SUBCUTANEOUS at 20:16

## 2023-05-31 RX ADMIN — OXYCODONE HYDROCHLORIDE AND ACETAMINOPHEN 1 TABLET: 7.5; 325 TABLET ORAL at 20:29

## 2023-05-31 RX ADMIN — TAMSULOSIN HYDROCHLORIDE 0.4 MG: 0.4 CAPSULE ORAL at 08:07

## 2023-05-31 RX ADMIN — ACETAMINOPHEN 650 MG: 325 TABLET, FILM COATED ORAL at 08:07

## 2023-05-31 RX ADMIN — SODIUM BICARBONATE 650 MG: 650 TABLET ORAL at 20:17

## 2023-05-31 RX ADMIN — SODIUM BICARBONATE 650 MG: 650 TABLET ORAL at 08:07

## 2023-05-31 RX ADMIN — CETIRIZINE HYDROCHLORIDE 10 MG: 10 TABLET, FILM COATED ORAL at 08:07

## 2023-05-31 RX ADMIN — Medication 400 MG: at 20:17

## 2023-05-31 RX ADMIN — Medication 5000 UNITS: at 20:16

## 2023-05-31 RX ADMIN — SODIUM BICARBONATE 650 MG: 650 TABLET ORAL at 17:12

## 2023-05-31 RX ADMIN — INSULIN ASPART 2 UNITS: 100 INJECTION, SOLUTION INTRAVENOUS; SUBCUTANEOUS at 11:24

## 2023-05-31 RX ADMIN — METOPROLOL SUCCINATE 25 MG: 25 TABLET, FILM COATED, EXTENDED RELEASE ORAL at 20:17

## 2023-05-31 RX ADMIN — DOCUSATE SODIUM 200 MG: 100 CAPSULE, LIQUID FILLED ORAL at 11:24

## 2023-05-31 RX ADMIN — APIXABAN 5 MG: 5 TABLET, FILM COATED ORAL at 20:16

## 2023-05-31 RX ADMIN — DOCUSATE SODIUM 50 MG AND SENNOSIDES 8.6 MG 2 TABLET: 8.6; 5 TABLET, FILM COATED ORAL at 08:07

## 2023-05-31 RX ADMIN — FLUTICASONE PROPIONATE 2 SPRAY: 50 SPRAY, METERED NASAL at 08:08

## 2023-05-31 RX ADMIN — DRONEDARONE 400 MG: 400 TABLET, FILM COATED ORAL at 20:16

## 2023-05-31 NOTE — CONSULTS
Airway  Urgency: elective    Date/Time: 4/28/2021 7:36 AM  Airway not difficult    General Information and Staff    Patient location during procedure: OR  CRNA: Damaris Sandoval CRNA    Indications and Patient Condition  Indications for airway management: airway protection    Preoxygenated: yes  MILS not maintained throughout  Mask difficulty assessment: 1 - vent by mask    Final Airway Details  Final airway type: endotracheal airway      Successful airway: ETT  Cuffed: yes   Successful intubation technique: direct laryngoscopy  Facilitating devices/methods: intubating stylet  Endotracheal tube insertion site: oral  Blade: Franco  Blade size: 2  ETT size (mm): 7.0  Cormack-Lehane Classification: grade I - full view of glottis  Placement verified by: chest auscultation and capnometry   Measured from: lips  ETT/EBT  to lips (cm): 20  Number of attempts at approach: 1  Assessment: lips, teeth, and gum same as pre-op and atraumatic intubation    Additional Comments  Negative epigastric sounds, Breath sound equal bilaterally with symmetric chest rise and fall.  Teeth intact. atraumatic             Adult Nutrition  Assessment/PES    Patient Name:  Emre Lisa  YOB: 1960  MRN: [id number]  Admit Date:  5/30/2023    Assessment Date:  5/31/2023    Comments:      Reason for RD visit:  RN/MD consult    Appetite: good  Chewing/swallowing difficulty:  denies  Food allergies:  Shellfish (noted in allergy list)  Change in Wt:  Reports 40#  Pt states he would like to lost another 60-70#.  RD discussed nutrient needs at this time.  Will d/c extra food order but will allow pt to order more food on trays per his request.  Discussed wanting a happy medium with kcal intake to not gain wt and align with wt loss goals.  Pt has noted L anterior foot incision with unna boot and support boot on.      Active Hospital Problems:  Active Hospital Problems    Diagnosis  POA   • **Neuropathy due to secondary diabetes mellitus [E13.40]  Yes   • Medically complex patient [Z78.9]  Yes   • Atrial fibrillation [I48.91]  Yes   • Presence of biventricular cardiac pacemaker [Z95.0]  Yes   • Hypertension [I10]  Yes   • Charcot's joint of left foot [M14.672]  Yes   • Type 2 diabetes mellitus with skin complication [E11.628]  Yes      Resolved Hospital Problems   No resolved problems to display.     Past Medical History:   Diagnosis Date   • Arthritis    • Atrial fibrillation    • Diabetes mellitus    • Diabetic foot ulcer associated with type 2 diabetes mellitus     left great toe   • Hallux malleus of left foot    • Hammer toe    • Hypertension    • MI (myocardial infarction)    • Neuropathy in diabetes    • Onychomycosis    • Presence of biventricular cardiac pacemaker    • Rheumatoid arthritis      Past Surgical History:   Procedure Laterality Date   • AMPUTATION DIGIT Right 03/05/2017    Procedure: RIGHT AMPUTATION TRANSMETATRASAL , RECESSION GASTROCNEMOUS;  Surgeon: Marco A Thompson DPM;  Location: [facility];  Service:    • ANKLE FUSION Left 04/13/2023    Procedure: REDUCTION OF LEFT ANKLE DEFORMITY WITH APPLICATION OF  EXTERNAL FIXATOR;  Surgeon: Marco A Thompson DPM;  Location: [facility] OR;  Service: Podiatry;  Laterality: Left;   • CARDIAC CATHETERIZATION     • CARDIAC DEFIBRILLATOR PLACEMENT  2010, 2016   • CARPAL TUNNEL RELEASE  2015    elbow and wrist left arm   • FOOT FUSION Left 05/15/2023    Procedure: REMOVAL OF EXTERNAL FIXATOR LEFT LOWER EXTREMITY WITH TIBIAL TALOCALCANEAL ARTHRODESIS  AND ALL INDICATED PROCEDURES;  Surgeon: Marco A Thompson DPM;  Location: [facility] OR;  Service: Podiatry;  Laterality: Left;   • FOOT IRRIGATION, DEBRIDEMENT AND REPAIR Left 03/07/2018    Procedure: FOOT IRRIGATION, DEBRIDEMENT AND REPAIR;  Surgeon: Marco A Thompson DPM;  Location: [facility] OR;  Service:    • FOOT IRRIGATION, DEBRIDEMENT AND REPAIR Left 03/10/2018    Procedure: FOOT IRRIGATION, DEBRIDEMENT AND REPAIR;  Surgeon: Marco A Thompson DPM;  Location: [facility];  Service: Podiatry   • FOOT WOUND CLOSURE Right 03/02/2017    Procedure: INCISION AND DRAINAGE, RIGHT FOOT;  Surgeon: Tung Rosenthal DPM;  Location: [facility] OR;  Service:    • HAMMER TOE REPAIR Left 02/15/2018    Procedure: HAMMERTOE CORRECTION LEFT SECOND, THIRD AND FOURTH TOES AND HALLUX INTERPHALANGEAL JOINT ARTHRODESIS LEFT FOOT (Micro Asnis, 4.0 & 5.0 Asnis)      (c-arm);  Surgeon: Marco A Thompson DPM;  Location: [facility] OR;  Service:    • HARDWARE REMOVAL Left 03/05/2018    Procedure: ANKLE/FOOT HARDWARE REMOVAL;  Surgeon: Marco A Thompson DPM;  Location: [facility] OR;  Service:    • INCISION AND DRAINAGE LEG Left 03/05/2018    Procedure: INCISION AND DRAINAGE LOWER EXTREMITY;  Surgeon: Marco A Thompson DPM;  Location: [facility] OR;  Service:    • PLANTAR FASCIA RELEASE Left 11/21/2019    Procedure: PLANTAR PLANING LEFT FOOT AND ALL OTHER INDICATED PROCEDURES;  Surgeon: Marco A Thompson DPM;  Location: [facility] OR;  Service: Podiatry   • TOE AMPUTATION Right 2016   • TONSILECTOMY, ADENOIDECTOMY, BILATERAL MYRINGOTOMY AND TUBES      age 25     Allergies:  Heparin, Januvia  [sitagliptin], Lipitor [atorvastatin], Shellfish allergy, and Sulfa antibiotics     Reason for Assessment     Row Name 05/31/23 1341          Reason for Assessment    Reason For Assessment physician consult     Identified At Risk by Screening Criteria other (see comments)  new admit                Nutrition/Diet History     Row Name 05/31/23 1341          Nutrition/Diet History    Typical Intake (Food/Fluid/EN/PN) pt reports good appetite.  Getting foods he likes most of the time                Labs/Tests/Procedures/Meds     Row Name 05/31/23 1342          Labs/Procedures/Meds    Lab Results Reviewed reviewed, pertinent     Lab Results Comments Glu 158, BUN 27        Diagnostic Tests/Procedures    Diagnostic Test/Procedure Reviewed reviewed, pertinent        Medications    Pertinent Medications Reviewed reviewed, pertinent     Pertinent Medications Comments Vit C, colace, glipizide, ssi, mag-ox, sodium bicarb, D3                  Estimated/Assessed Needs - Anthropometrics     Row Name 05/31/23 1343          Anthropometrics    Weight for Calculation 80.7 kg (178 lb)  IBW        Estimated/Assessed Needs    Additional Documentation KCAL/KG (Group);Protein Requirements (Group);Fluid Requirements (Group)        KCAL/KG    KCAL/KG 25 Kcal/Kg (kcal)     25 Kcal/Kg (kcal) 2018.5        Protein Requirements    Weight Used For Protein Calculations 80.7 kg (178 lb)     Est Protein Requirement Amount (gms/kg) 1.0 gm protein     Estimated Protein Requirements (gms/day) 80.74        Fluid Requirements    Fluid Requirements (mL/day) 2000     RDA Method (mL) 2000                Nutrition Prescription Ordered     Row Name 05/31/23 1347          Nutrition Prescription PO    Current PO Diet Regular     Common Modifiers Consistent Carbohydrate                     Problem/Interventions:   Problem 1     Row Name 05/31/23 1356          Nutrition Diagnoses Problem 1    Problem 1 Increased Nutrient Needs     Macronutrient Kcal;Protein      Micronutrient Vitamin;Mineral     Etiology (related to) Medical Diagnosis     Skin Surgical wound     Signs/Symptoms (evidenced by) Other (comment)  unnaboot and support boot in place                      Intervention Goal     Row Name 05/31/23 6806          Intervention Goal    General Maintain nutrition;Meet nutritional needs for age/condition     PO Meet estimated needs     Weight Appropriate weight loss                Nutrition Intervention     Row Name 05/31/23 1401          Nutrition Intervention    RD/Tech Action Follow Tx progress;Care plan reviewd                  Education/Evaluation     Row Name 05/31/23 1403          Education    Education Education topics     Education Topics Basic nutrition        Monitor/Evaluation    Monitor Per protocol;PO intake;Pertinent labs;Weight;Skin status     Education Follow-up Reinforce PRN                 Electronically signed by:  Gavi Xiong RD  05/31/23 14:06 CDT

## 2023-05-31 NOTE — PLAN OF CARE
Goal Outcome Evaluation:  Plan of Care Reviewed With: patient        Progress: improving  Outcome Evaluation: pt perf well with OT  pt perf ther ex b ue level 4 tband all planes.  Pt perf mob with wc cordoba and donning doffing shoes socks multi times with multi positions for increased perf  pt denied elastic shoe laces and demo increased perf with propping on a stool

## 2023-05-31 NOTE — PLAN OF CARE
VSS. Pain controlled with PRN meds. Continues to work with therapy. Transfers to chair and bed independently with standby assistance. Up in chair for meals. Uses urinal as needed. Capable of voicing needs. Will continue to monitor progress.

## 2023-05-31 NOTE — THERAPY EVALUATION
Inpatient Rehabilitation - Occupational Therapy Initial Evaluation    [facility]     Patient Name: Emre Lisa  : 1960  MRN: [id number]    Today's Date: 2023                 Admit Date: 2023         ICD-10-CM ICD-9-CM   1. Symbolic dysfunction  R48.9 784.60   2. Impaired functional mobility, balance, gait, and endurance  Z74.09 V49.89   3. Impaired mobility and ADLs  Z74.09 V49.89    Z78.9        Patient Active Problem List   Diagnosis   • Foot osteomyelitis, right   • Nodule of groin   • Type 2 diabetes mellitus with skin complication   • Status post transmetatarsal amputation of right foot   • Hammer toe of left foot   • Hallux malleus of left foot   • Cellulitis of left foot   • Infected hardware in left leg   • Chronic multifocal osteomyelitis of left foot   • Charcot's joint of left foot   • Skin ulcer of left foot, limited to breakdown of skin   • Dyspnea   • Syncope   • Acute respiratory failure with hypoxia   • Tachycardia   • Neuropathy due to secondary diabetes mellitus   • Atrial fibrillation   • Presence of biventricular cardiac pacemaker   • Hypertension   • Medically complex patient       Past Medical History:   Diagnosis Date   • Arthritis    • Atrial fibrillation    • Diabetes mellitus    • Diabetic foot ulcer associated with type 2 diabetes mellitus     left great toe   • Hallux malleus of left foot    • Hammer toe    • Hypertension    • MI (myocardial infarction)    • Neuropathy in diabetes    • Onychomycosis    • Presence of biventricular cardiac pacemaker    • Rheumatoid arthritis        Past Surgical History:   Procedure Laterality Date   • AMPUTATION DIGIT Right 2017    Procedure: RIGHT AMPUTATION TRANSMETATRASAL , RECESSION GASTROCNEMOUS;  Surgeon: Marco A Thompson DPM;  Location: [facility];  Service:    • ANKLE FUSION Left 2023    Procedure: REDUCTION OF LEFT ANKLE DEFORMITY WITH APPLICATION OF EXTERNAL FIXATOR;  Surgeon: Marco A Thompson DPM;   Location: [facility] OR;  Service: Podiatry;  Laterality: Left;   • CARDIAC CATHETERIZATION     • CARDIAC DEFIBRILLATOR PLACEMENT  2010, 2016   • CARPAL TUNNEL RELEASE  2015    elbow and wrist left arm   • FOOT FUSION Left 05/15/2023    Procedure: REMOVAL OF EXTERNAL FIXATOR LEFT LOWER EXTREMITY WITH TIBIAL TALOCALCANEAL ARTHRODESIS  AND ALL INDICATED PROCEDURES;  Surgeon: Marco A Thompson DPM;  Location: [facility] OR;  Service: Podiatry;  Laterality: Left;   • FOOT IRRIGATION, DEBRIDEMENT AND REPAIR Left 03/07/2018    Procedure: FOOT IRRIGATION, DEBRIDEMENT AND REPAIR;  Surgeon: Marco A Thompson DPM;  Location: [facility] OR;  Service:    • FOOT IRRIGATION, DEBRIDEMENT AND REPAIR Left 03/10/2018    Procedure: FOOT IRRIGATION, DEBRIDEMENT AND REPAIR;  Surgeon: Marco A Thompson DPM;  Location: [facility];  Service: Podiatry   • FOOT WOUND CLOSURE Right 03/02/2017    Procedure: INCISION AND DRAINAGE, RIGHT FOOT;  Surgeon: Tung Rosenthal DPM;  Location: [facility] OR;  Service:    • HAMMER TOE REPAIR Left 02/15/2018    Procedure: HAMMERTOE CORRECTION LEFT SECOND, THIRD AND FOURTH TOES AND HALLUX INTERPHALANGEAL JOINT ARTHRODESIS LEFT FOOT (Micro Asnis, 4.0 & 5.0 Asnis)      (c-arm);  Surgeon: Marco A Thompson DPM;  Location: [facility] OR;  Service:    • HARDWARE REMOVAL Left 03/05/2018    Procedure: ANKLE/FOOT HARDWARE REMOVAL;  Surgeon: Marco A Thompson DPM;  Location: [facility] [facility];  Service:    • INCISION AND DRAINAGE LEG Left 03/05/2018    Procedure: INCISION AND DRAINAGE LOWER EXTREMITY;  Surgeon: Marco A Thompson DPM;  Location: [facility] OR;  Service:    • PLANTAR FASCIA RELEASE Left 11/21/2019    Procedure: PLANTAR PLANING LEFT FOOT AND ALL OTHER INDICATED PROCEDURES;  Surgeon: Marco A Thompson DPM;  Location: [facility] OR;  Service: Podiatry   • TOE AMPUTATION Right 2016   • TONSILECTOMY, ADENOIDECTOMY, BILATERAL MYRINGOTOMY AND TUBES      age 25             IRF OT ASSESSMENT FLOWSHEET (last 12 hours)     IRF OT Evaluation and Treatment      Row Name 05/31/23 0951          Pain Assessment    Posttreatment Pain Rating 2/10  -[facility] Name 05/31/23 0951          Range of Motion Comprehensive    General Range of Motion bilateral upper extremity ROM WFL  -RW     Comment, General Range of Motion Pt reported B shld injuries, however able to perform AROM of B shld WFL. B elbow/wrist/digits WFL.  -[facility] Name 05/31/23 0951          Strength Comprehensive (MMT)    General Manual Muscle Testing (MMT) Assessment upper extremity strength deficits identified  -     Comment, General Manual Muscle Testing (MMT) Assessment R shld flex/exten not tested d/t prior injuries; L shld flex/exten 4/5 grossly; B elbow flex/exten and  4/5 grossly.  -[facility] Name 05/31/23 0951          Sensory Assessment (Somatosensory)    Sensory Assessment (Somatosensory) UE sensation intact  -[facility] Name 05/31/23 0951          Basic Activities of Daily Living (BADLs)    Basic Activities of Daily Living toileting  -RW     Row Name 05/31/23 0951          Toileting    [place] Level (Toileting) toileting skills;dependent (less than 25% patient effort)  -     Position (Toileting) supported standing  -[facility] Name 05/31/23 0951          Mobility    Extremity Weight-bearing Status left lower extremity  -     Left Lower Extremity (Weight-bearing Status) weight-bearing as tolerated (WBAT)  -[facility] Name 05/31/23 0951          Bed Mobility    Bed Mobility sit-supine  -     Sit-Supine [place] (Bed Mobility) standby assist  -RW     Row Name 05/31/23 0951          Transfer Assessment/Treatment    Transfers sit-stand transfer;toilet transfer  -[facility] Name 05/31/23 0951          Sit-Stand Transfer    Sit-Stand [place] (Transfers) contact guard  -     Assistive Device (Sit-Stand Transfers) walker, front-wheeled  grab bar in bathroom  -     Row Name 05/31/23 0951          Toilet Transfer    Type (Toilet Transfer) sit-stand;stand-sit  -     [place]  Level (Toilet Transfer) contact guard  -RW     Assistive Device (Toilet Transfer) commode, bedside with drop arms;grab bars/safety frame  BSC over toilet  -RW     Row Name 05/31/23 0951          Safety Issues, Functional Mobility    Safety Issues Affecting Function (Mobility) ability to follow commands;at risk behavior observed;awareness of need for assistance;insight into deficits/self-awareness;judgment;positioning of assistive device;safety precaution awareness;problem-solving;safety precautions follow-through/compliance  -RW     Impairments Affecting Function (Mobility) balance;endurance/activity tolerance;coordination;pain;postural/trunk control;range of motion (ROM);strength  -RW     Row Name 05/31/23 0951          Positioning and Restraints    Pre-Treatment Position other (comment)  w/c  -RW     Post Treatment Position bed  -RW     In Bed notified nsg;call light within reach;encouraged to call for assist;supine;LLE elevated  -RW     Row Name 05/31/23 0951          Therapy Assessment/Plan (OT)    Patient's Goals For Discharge return home  -RW     Row Name 05/31/23 0951          Therapy Assessment/Plan (OT)    OT Diagnosis Impaired mobility and ADL  -RW     Rehab Potential/Prognosis (OT) good, to achieve stated therapy goals  -RW     Frequency of Treatment (OT) 90 minutes per session;other (see comments)  5-6 d/wk  -RW     Estimated Duration of Therapy (OT) until facility discharge  -RW     Problem List (OT) problems related to;balance;coordination;mobility;strength;motor control;range of motion (ROM)  -RW     Activity Limitations Related to Problem List (OT) BADLs not performed adequately or safely;IADLs not performed adequately or safely;unable to transfer safely;unable to ambulate safely  -RW     Planned Therapy Interventions (OT) activity tolerance training;adaptive equipment training;BADL retraining;cognitive/visual perception retraining;functional balance retraining;IADL retraining;manual therapy/joint  mobilization;occupation/activity based interventions;passive ROM/stretching;edema control/reduction;neuromuscular control/coordination retraining;patient/caregiver education/training;ROM/therapeutic exercise;strengthening exercise;transfer/mobility retraining;wheelchair assessment/training  -RW     Row Name 05/31/23 0951          Evaluation Complexity (OT)    Review Occupational Profile/Medical/Therapy History Complexity expanded/moderate complexity  -RW     Assessment, Occupational Performance/Identification of Deficit Complexity 3-5 performance deficits  -RW     Clinical Decision Making Complexity (OT) detailed assessment/moderate complexity  -RW     Overall Complexity of Evaluation (OT) moderate complexity  -RW     Row Name 05/31/23 0951          IRF OT Goals    Transfer Goal Selection (OT-IRF) transfers, OT goal 1  -RW     Bathing Goal Selection (OT-IRF) bathing, OT goal 1  -RW     LB Dressing Goal Selection (OT-IRF) LB dressing, OT goal 1  -RW     Toileting Goal Selection (OT-IRF) toileting, OT goal 1  -RW     Activity Tolerance Goal Selection (OT) activity tolerance, OT goal 1  -RW     Row Name 05/31/23 0951          Transfer Goal 1 (OT-IRF)    Activity/Assistive Device (Transfer Goal 1, OT-IRF) all transfers  -RW     [place] Level (Transfer Goal 1, OT-IRF) modified independence  -RW     Time Frame (Transfer Goal 1, OT-IRF) long-term goal (LTG);by discharge  -RW     Progress/Outcomes (Transfer Goal 1, OT-IRF) goal not met  -RW     Row Name 05/31/23 0951          Bathing Goal 1 (OT-IRF)    Activity/Device (Bathing Goal 1, OT-IRF) lower body bathing  -RW     [place] Level (Bathing Goal 1, OT-IRF) modified independence  -RW     Time Frame (Bathing Goal 1, OT-IRF) long-term goal (LTG);by discharge  -RW     Progress/Outcomes (Bathing Goal 1, OT-IRF) goal not met  -RW     Row Name 05/31/23 0951          LB Dressing Goal 1 (OT-IRF)    Activity/Device (LB Dressing Goal 1, OT-IRF) lower body dressing  -RW      [place] (LB Dressing Goal 1, OT-IRF) modified independence  -RW     Time Frame (LB Dressing Goal 1, OT-IRF) long-term goal (LTG);by discharge  -RW     Progress/Outcomes (LB Dressing Goal 1, OT-IRF) goal not met  -RW     Row Name 05/31/23 0951          Toileting Goal 1 (OT-IRF)    Activity/Device (Toileting Goal 1, OT-IRF) toileting skills, all  -RW     [place] Level (Toileting Goal 1, OT-IRF) modified independence  -RW     Progress/Outcomes (Toileting Goal 1, OT-IRF) goal not met  -RW     Time Frame (Toileting Goal 1, OT-IRF) long-term goal (LTG);by discharge  -RW     Row Name 05/31/23 0951           Activity Tolerance Goal 1 (OT)    Activity Tolerance Goal 1 (OT) Pt will perform ADL at sinkside for 15 minutes with mod (I) to increase ax tolerance.  -RW     Activity Level (Endurance Goal 1, OT) 15 min activity  -RW     Time Frame (Activity Tolerance Goal 1, OT) long term goal (LTG);by discharge  -RW     Progress/Outcome (Activity Tolerance Goal 1, OT) goal not met  -RW           User Key  (r) = Recorded By, (t) = Taken By, (c) = Cosigned By    Initials Name Effective Dates    RW Adelita Castellon, OT 09/22/22 -                          OT Recommendation and Plan    Planned Therapy Interventions (OT): activity tolerance training, adaptive equipment training, BADL retraining, cognitive/visual perception retraining, functional balance retraining, IADL retraining, manual therapy/joint mobilization, occupation/activity based interventions, passive ROM/stretching, edema control/reduction, neuromuscular control/coordination retraining, patient/caregiver education/training, ROM/therapeutic exercise, strengthening exercise, transfer/mobility retraining, wheelchair assessment/training    Plan of Care Review  Plan of Care Reviewed With: patient  Outcome Evaluation: OT eval complete. Pt sitting in w/c upon arrival and agreeable to therapy. Pt reported B shld injuries, however able to perform AROM of B shld WFL, B  elbow\/wrist/digits WFL. Pts BUE MMT as follows: R shld flex/exten not tested d/t prior injuries; L shld flex/exten 4/5 grossly; B elbow flex/exten and  4/5 grossly. Pt perf B hand dynamometer, R hand as 24,25,22kg and L hand as 17,16,15kg. Pt perf 9 hole peg test, R hand as 51.2 seconds and L hand as 45.88 seconds. Pt is R hand dominant. Pt perf sit<>stand, chair>bed, and toilet transfer with CGA, grab bars, and walker.  Pt dependent for clothing management and perineal hygiene. Pt perf sit>sup with SBA. Pt demonstrated decreased independence with ADLs, strength, and transfer safety. Cont inpatient OT.  Plan of Care Reviewed With: patient           Self-Care Admission Goal Date Discharge   Eating 5 6     Oral hygiene 5 6     Toileting hygiene 1 6     Shower/bathe self 2 6     Upper body dressing 4 6     Lower body dressing 2 6     Putting on/taking off footwear 2 6          Outcome Measures     Row Name 05/31/23 1251 05/31/23 0801          10 Meter Walk Test Self-Selected Velocity    Self-Selected Velocity: Trial 1 -- 40 sec.  -LR     Self-Selected Velocity: Trial 2 -- 40 sec.  -LR     Self-Selected Velocity: Trial 3 -- 40 sec.  -LR     Self-Selected Velocity: Average Time -- 40 sec.  -LR        10 Meter Walk Test Actual Velocity    Actual Self-Selected Velocity -- 0.15 m/s  -LR        9 Hole Peg    9-Hole Peg Left 45.88  -RW --     9-Hole Peg Right 51.2  -RW --        How much help from another person do you currently need...    Turning from your back to your side while in flat bed without using bedrails? -- 2  -LR     Moving from lying on back to sitting on the side of a flat bed without bedrails? -- 2  -LR     Moving to and from a bed to a chair (including a wheelchair)? -- 2  -LR     Standing up from a chair using your arms (e.g., wheelchair, bedside chair)? -- 2  -LR     Climbing 3-5 steps with a railing? -- 3  -LR     To walk in hospital room? -- 1  -LR     AM-PAC 6 Clicks Score (PT) -- 12  -LR        How  much help from another is currently needed...    Putting on and taking off regular lower body clothing? 2  -RW --     Bathing (including washing, rinsing, and drying) 2  -RW --     Toileting (which includes using toilet bed pan or urinal) 2  -RW --     Putting on and taking off regular upper body clothing 3  -RW --     Taking care of personal grooming (such as brushing teeth) 4  -RW --     Eating meals 4  -RW --     AM-PAC 6 Clicks Score (OT) 17  -RW --        Functional Assessment    Outcome Measure Options AM-PAC 6 Clicks Daily Activity (OT);9 Hole Peg  -RW 10 Meter Walk;AM-PAC 6 Clicks Basic Mobility (PT)  -LR           User Key  (r) = Recorded By, (t) = Taken By, (c) = Cosigned By    Initials Name Provider Type    LR Phong Weller Physical Therapist    Adelita Whitaker OT Occupational Therapist                  Time Calculation:      Time Calculation- OT     Row Name 05/31/23 1307             Time Calculation- OT    OT Start Time 0951  -RW      OT Stop Time 1038  -RW      OT Time Calculation (min) 47 min  -RW      OT Received On 05/31/23  -RW      OT Goal Re-Cert Due Date 06/13/23  -RW         Untimed Charges    OT Eval/Re-eval Minutes 47  -RW         Total Minutes    Untimed Charges Total Minutes 47  -RW       Total Minutes 47  -RW            User Key  (r) = Recorded By, (t) = Taken By, (c) = Cosigned By    Initials Name Provider Type    Adelita Whitaker OT Occupational Therapist              Therapy Charges for Today     Code Description Service Date Service Provider Modifiers Qty    88948324214 HC OT EVAL MOD COMPLEXITY 3 5/31/2023 Adelita Castellon OT GO 1                   Adelita Castellon OT  5/31/2023

## 2023-05-31 NOTE — PROGRESS NOTES
Podiatric Surgery Progress Note    Subjective     Patient resting comfortably in bed.    Objective     Vital signs in last 24 hours:  Temp:  [98 °F (36.7 °C)] 98 °F (36.7 °C)  Heart Rate:  [88-91] 88  Resp:  [18] 18  BP: (112-130)/(70-78) 112/70    General: alert, appears stated age and cooperative   Neurovascular: SILT  Capillary refill: Normal   Wound: Wound dressed recently by MARIA DEL CARMEN WATSON. Media reviewed. Dressing CDI.             Data Review  CBC:  Results from last 7 days   Lab Units 05/29/23  0549   WBC 10*3/mm3 4.93   RBC 10*6/mm3 4.04*   HEMOGLOBIN g/dL 11.2*   HEMATOCRIT % 35.5*   PLATELETS 10*3/mm3 195       Assessment & Plan     Charcot ankle    Patient doing well postoperatively.    WBAT to LLE with PT.   Continue Unna boot left lower extremity for edema control.    All his questions were answered.    Call with questions.     LOS: 1 day           This document has been electronically signed by WALTER Triana on May 31, 2023 16:45 CDT

## 2023-05-31 NOTE — PLAN OF CARE
Problem: Rehabilitation (IRF) Plan of Care  Goal: Plan of Care Review  Outcome: Ongoing, Progressing  Flowsheets (Taken 5/31/2023 1409)  Plan of Care Reviewed With: patient   Goal Outcome Evaluation:  Plan of Care Reviewed With: patient      Reason for RD visit:  RN/MD consult    Appetite: good  Chewing/swallowing difficulty:  denies  Food allergies:  Shellfish (noted in allergy list)  Change in Wt:  Reports 40#  Pt states he would like to lost another 60-70#.  RD discussed nutrient needs at this time.  Will d/c extra food order but will allow pt to order more food on trays per his request.  Discussed wanting a happy medium with kcal intake to not gain wt and align with wt loss goals.  Pt has noted L anterior foot incision with unna boot and support boot on.

## 2023-05-31 NOTE — THERAPY TREATMENT NOTE
Inpatient Rehabilitation - Occupational Therapy Treatment Note    [facility]     Patient Name: Emre Lisa  : 1960  MRN: [id number]    Today's Date: 2023                 Admit Date: 2023         ICD-10-CM ICD-9-CM   1. Symbolic dysfunction  R48.9 784.60   2. Impaired functional mobility, balance, gait, and endurance  Z74.09 V49.89   3. Impaired mobility and ADLs  Z74.09 V49.89    Z78.9        Patient Active Problem List   Diagnosis   • Foot osteomyelitis, right   • Nodule of groin   • Type 2 diabetes mellitus with skin complication   • Status post transmetatarsal amputation of right foot   • Hammer toe of left foot   • Hallux malleus of left foot   • Cellulitis of left foot   • Infected hardware in left leg   • Chronic multifocal osteomyelitis of left foot   • Charcot's joint of left foot   • Skin ulcer of left foot, limited to breakdown of skin   • Dyspnea   • Syncope   • Acute respiratory failure with hypoxia   • Tachycardia   • Neuropathy due to secondary diabetes mellitus   • Atrial fibrillation   • Presence of biventricular cardiac pacemaker   • Hypertension   • Medically complex patient       Past Medical History:   Diagnosis Date   • Arthritis    • Atrial fibrillation    • Diabetes mellitus    • Diabetic foot ulcer associated with type 2 diabetes mellitus     left great toe   • Hallux malleus of left foot    • Hammer toe    • Hypertension    • MI (myocardial infarction)    • Neuropathy in diabetes    • Onychomycosis    • Presence of biventricular cardiac pacemaker    • Rheumatoid arthritis        Past Surgical History:   Procedure Laterality Date   • AMPUTATION DIGIT Right 2017    Procedure: RIGHT AMPUTATION TRANSMETATRASAL , RECESSION GASTROCNEMOUS;  Surgeon: Marco A Thompson DPM;  Location: [facility] OR;  Service:    • ANKLE FUSION Left 2023    Procedure: REDUCTION OF LEFT ANKLE DEFORMITY WITH APPLICATION OF EXTERNAL FIXATOR;  Surgeon: Marco A Thompson DPM;  Location:  [facility] OR;  Service: Podiatry;  Laterality: Left;   • CARDIAC CATHETERIZATION     • CARDIAC DEFIBRILLATOR PLACEMENT  2010, 2016   • CARPAL TUNNEL RELEASE  2015    elbow and wrist left arm   • FOOT FUSION Left 05/15/2023    Procedure: REMOVAL OF EXTERNAL FIXATOR LEFT LOWER EXTREMITY WITH TIBIAL TALOCALCANEAL ARTHRODESIS  AND ALL INDICATED PROCEDURES;  Surgeon: Marco A Thompson DPM;  Location: [facility] OR;  Service: Podiatry;  Laterality: Left;   • FOOT IRRIGATION, DEBRIDEMENT AND REPAIR Left 03/07/2018    Procedure: FOOT IRRIGATION, DEBRIDEMENT AND REPAIR;  Surgeon: Marco A Thompson DPM;  Location: [facility] OR;  Service:    • FOOT IRRIGATION, DEBRIDEMENT AND REPAIR Left 03/10/2018    Procedure: FOOT IRRIGATION, DEBRIDEMENT AND REPAIR;  Surgeon: Marco A Thompson DPM;  Location: [facility] OR;  Service: Podiatry   • FOOT WOUND CLOSURE Right 03/02/2017    Procedure: INCISION AND DRAINAGE, RIGHT FOOT;  Surgeon: Tung Rosenthal DPM;  Location: [facility] OR;  Service:    • HAMMER TOE REPAIR Left 02/15/2018    Procedure: HAMMERTOE CORRECTION LEFT SECOND, THIRD AND FOURTH TOES AND HALLUX INTERPHALANGEAL JOINT ARTHRODESIS LEFT FOOT (Micro Asnis, 4.0 & 5.0 Asnis)      (c-arm);  Surgeon: Marco A Thompson DPM;  Location: [facility] OR;  Service:    • HARDWARE REMOVAL Left 03/05/2018    Procedure: ANKLE/FOOT HARDWARE REMOVAL;  Surgeon: Marco A Thompson DPM;  Location: [facility] OR;  Service:    • INCISION AND DRAINAGE LEG Left 03/05/2018    Procedure: INCISION AND DRAINAGE LOWER EXTREMITY;  Surgeon: Marco A Thompson DPM;  Location: [facility] OR;  Service:    • PLANTAR FASCIA RELEASE Left 11/21/2019    Procedure: PLANTAR PLANING LEFT FOOT AND ALL OTHER INDICATED PROCEDURES;  Surgeon: Marco A Thompson DPM;  Location: [facility] OR;  Service: Podiatry   • TOE AMPUTATION Right 2016   • TONSILECTOMY, ADENOIDECTOMY, BILATERAL MYRINGOTOMY AND TUBES      age 25             IRF OT ASSESSMENT FLOWSHEET (last 12 hours)     IRF OT Evaluation and Treatment     Row Name  05/31/23 1345          OT Time and Intention    Document Type daily treatment  -LM     Mode of Treatment individual therapy;occupational therapy  -     Row Name 05/31/23 1345          General Information    Patient Profile Reviewed yes  -LM     Existing Precautions/Restrictions fall  -     Row Name 05/31/23 1345          Home Use of Assistive/Adaptive Equipment    Equipment Currently Used at Home other (see comments);cane, quad;walker, standard;glucometer;power chair,(recliner lift);cane, quad tip  knee scooter, walk in shower and no room for a chair; regular toilet  -     Row Name 05/31/23 1345 05/31/23 0951       Pain Assessment    Pretreatment Pain Rating 2/10  -LM --    Posttreatment Pain Rating 2/10  -LM 2/10  -[facility] Name 05/31/23 1345          Cognition/Psychosocial    Orientation Status (Cognition) oriented x 4  -[facility] Name 05/31/23 1345 05/31/23 0951       Range of Motion Comprehensive    General Range of Motion bilateral upper extremity ROM WFL  -LM bilateral upper extremity ROM WFL  -RW    Comment, General Range of Motion -- Pt reported B shld injuries, however able to perform AROM of B shld WFL. B elbow/wrist/digits WFL.  -    Row Name 05/31/23 1345 05/31/23 0951       Strength Comprehensive (MMT)    General Manual Muscle Testing (MMT) Assessment upper extremity strength deficits identified  -LM upper extremity strength deficits identified  -RW    Comment, General Manual Muscle Testing (MMT) Assessment -- R shld flex/exten not tested d/t prior injuries; L shld flex/exten 4/5 grossly; B elbow flex/exten and  4/5 grossly.  -    Row Name 05/31/23 0951          Sensory Assessment (Somatosensory)    Sensory Assessment (Somatosensory) UE sensation intact  -     Row Name 05/31/23 1345 05/31/23 0951       Basic Activities of Daily Living (BADLs)    Basic Activities of Daily Living lower body dressing  -LM toileting  -[facility] Name 05/31/23 1345          Lower Body Dressing     [place] Level (Lower Body Dressing) doff;don;socks;shoes/slippers;standby assist;set up  pt has increased perf with stool  -     Row Name 05/31/23 0951          Toileting    [place] Level (Toileting) toileting skills;dependent (less than 25% patient effort)  -     Position (Toileting) supported standing  -     Row Name 05/31/23 1345 05/31/23 0951       Mobility    Extremity Weight-bearing Status left lower extremity  -LM left lower extremity  -RW    Left Lower Extremity (Weight-bearing Status) weight-bearing as tolerated (WBAT)  -LM weight-bearing as tolerated (WBAT)  -    Row Name 05/31/23 1345 05/31/23 0951       Bed Mobility    Bed Mobility sit-supine  -LM sit-supine  -RW    Sit-Supine [place] (Bed Mobility) standby assist  - standby assist  -    Row Name 05/31/23 1345 05/31/23 0951       Transfer Assessment/Treatment    Transfers sit-stand transfer;toilet transfer  - sit-stand transfer;toilet transfer  -    Row Name 05/31/23 1345 05/31/23 0951       Sit-Stand Transfer    Sit-Stand [place] (Transfers) contact guard  - contact guard  -    Assistive Device (Sit-Stand Transfers) walker, front-wheeled  grab bar in bathroom  - walker, front-wheeled  grab bar in bathroom  -    Row Name 05/31/23 1345 05/31/23 0951       Toilet Transfer    Type (Toilet Transfer) sit-stand;stand-sit  -LM sit-stand;stand-sit  -RW    [place] Level (Toilet Transfer) contact guard  - contact guard  -    Assistive Device (Toilet Transfer) commode, bedside with drop arms;grab bars/safety frame  BSC over toilet  - commode, bedside with drop arms;grab bars/safety frame  BSC over toilet  -    Row Name 05/31/23 1345 05/31/23 0951       Safety Issues, Functional Mobility    Safety Issues Affecting Function (Mobility) -- ability to follow commands;at risk behavior observed;awareness of need for assistance;insight into deficits/self-awareness;judgment;positioning of assistive device;safety  precaution awareness;problem-solving;safety precautions follow-through/compliance  -RW    Impairments Affecting Function (Mobility) balance;endurance/activity tolerance;coordination;pain;postural/trunk control;range of motion (ROM);strength  -LM balance;endurance/activity tolerance;coordination;pain;postural/trunk control;range of motion (ROM);strength  -RW    Row Name 05/31/23 1345          Motor Skills    Motor Skills other (see comments)  ther ex with level 2 tband all planes no overhead due to shld pain  2 sets of 20  -LM     Row Name 05/31/23 1345 05/31/23 0951       Positioning and Restraints    Pre-Treatment Position sitting in chair/recliner  -LM other (comment)  w/c  -RW    Post Treatment Position chair  -LM bed  -RW    In Bed -- notified nsg;call light within reach;encouraged to call for assist;supine;LLE elevated  -RW    Row Name 05/31/23 1345 05/31/23 0951       Therapy Assessment/Plan (OT)    Patient's Goals For Discharge return home  -LM return home  -RW    Row Name 05/31/23 1345 05/31/23 0951       Therapy Assessment/Plan (OT)    OT Diagnosis -- Impaired mobility and ADL  -RW    Rehab Potential/Prognosis (OT) good, to achieve stated therapy goals  -LM good, to achieve stated therapy goals  -RW    Frequency of Treatment (OT) 90 minutes per session;other (see comments)  5-6 d/wk  -LM 90 minutes per session;other (see comments)  5-6 d/wk  -RW    Estimated Duration of Therapy (OT) until facility discharge  -LM until facility discharge  -RW    Problem List (OT) problems related to;balance;coordination;mobility;strength;motor control;range of motion (ROM)  -LM problems related to;balance;coordination;mobility;strength;motor control;range of motion (ROM)  -RW    Activity Limitations Related to Problem List (OT) BADLs not performed adequately or safely;IADLs not performed adequately or safely;unable to transfer safely;unable to ambulate safely  -LM BADLs not performed adequately or safely;IADLs not performed  adequately or safely;unable to transfer safely;unable to ambulate safely  -RW    Planned Therapy Interventions (OT) -- activity tolerance training;adaptive equipment training;BADL retraining;cognitive/visual perception retraining;functional balance retraining;IADL retraining;manual therapy/joint mobilization;occupation/activity based interventions;passive ROM/stretching;edema control/reduction;neuromuscular control/coordination retraining;patient/caregiver education/training;ROM/therapeutic exercise;strengthening exercise;transfer/mobility retraining;wheelchair assessment/training  -RW    Row Name 05/31/23 0951          Evaluation Complexity (OT)    Review Occupational Profile/Medical/Therapy History Complexity expanded/moderate complexity  -RW     Assessment, Occupational Performance/Identification of Deficit Complexity 3-5 performance deficits  -RW     Clinical Decision Making Complexity (OT) detailed assessment/moderate complexity  -RW     Overall Complexity of Evaluation (OT) moderate complexity  -RW     Row Name 05/31/23 1345 05/31/23 0951       IRF OT Goals    Transfer Goal Selection (OT-IRF) transfers, OT goal 1  -LM transfers, OT goal 1  -RW    Bathing Goal Selection (OT-IRF) bathing, OT goal 1  -LM bathing, OT goal 1  -RW    LB Dressing Goal Selection (OT-IRF) LB dressing, OT goal 1  -LM LB dressing, OT goal 1  -RW    Toileting Goal Selection (OT-IRF) toileting, OT goal 1  -LM toileting, OT goal 1  -RW    Activity Tolerance Goal Selection (OT) activity tolerance, OT goal 1  -LM activity tolerance, OT goal 1  -RW    Row Name 05/31/23 1345 05/31/23 0951       Transfer Goal 1 (OT-IRF)    Activity/Assistive Device (Transfer Goal 1, OT-IRF) all transfers  -LM all transfers  -RW    [place] Level (Transfer Goal 1, OT-IRF) modified independence  -LM modified independence  -RW    Time Frame (Transfer Goal 1, OT-IRF) long-term goal (LTG);by discharge  -LM long-term goal (LTG);by discharge  -RW     Progress/Outcomes (Transfer Goal 1, OT-IRF) goal not met  -LM goal not met  -RW    Row Name 05/31/23 1345 05/31/23 0951       Bathing Goal 1 (OT-IRF)    Activity/Device (Bathing Goal 1, OT-IRF) lower body bathing  -LM lower body bathing  -RW    [place] Level (Bathing Goal 1, OT-IRF) modified independence  -LM modified independence  -RW    Time Frame (Bathing Goal 1, OT-IRF) long-term goal (LTG);by discharge  -LM long-term goal (LTG);by discharge  -RW    Progress/Outcomes (Bathing Goal 1, OT-IRF) goal not met  -LM goal not met  -RW    Row Name 05/31/23 1345 05/31/23 0951       LB Dressing Goal 1 (OT-IRF)    Activity/Device (LB Dressing Goal 1, OT-IRF) lower body dressing  -LM lower body dressing  -RW    [place] (LB Dressing Goal 1, OT-IRF) modified independence  -LM modified independence  -RW    Time Frame (LB Dressing Goal 1, OT-IRF) long-term goal (LTG);by discharge  -LM long-term goal (LTG);by discharge  -RW    Progress/Outcomes (LB Dressing Goal 1, OT-IRF) goal not met  -LM goal not met  -RW    Row Name 05/31/23 1345 05/31/23 0951       Toileting Goal 1 (OT-IRF)    Activity/Device (Toileting Goal 1, OT-IRF) toileting skills, all  -LM toileting skills, all  -RW    [place] Level (Toileting Goal 1, OT-IRF) modified independence  -LM modified independence  -RW    Progress/Outcomes (Toileting Goal 1, OT-IRF) goal not met  -LM goal not met  -RW    Time Frame (Toileting Goal 1, OT-IRF) long-term goal (LTG);by discharge  -LM long-term goal (LTG);by discharge  -RW    Row Name 05/31/23 1345 05/31/23 0951        Activity Tolerance Goal 1 (OT)    Activity Tolerance Goal 1 (OT) -- Pt will perform ADL at sinkside for 15 minutes with mod (I) to increase ax tolerance.  -RW    Activity Level (Endurance Goal 1, OT) 15 min activity  -LM 15 min activity  -RW    Time Frame (Activity Tolerance Goal 1, OT) -- long term goal (LTG);by discharge  -RW    Progress/Outcome (Activity Tolerance Goal 1, OT) goal not met  -LM goal  not met  -RW          User Key  (r) = Recorded By, (t) = Taken By, (c) = Cosigned By    Initials Name Effective Dates    LM Desirae Brenner, TRINH 06/16/21 -     RW Adelita Castellon OT 09/22/22 -                          OT Recommendation and Plan         Plan of Care Review  Plan of Care Reviewed With: patient  Progress: improving  Outcome Evaluation: pt perf well with OT  pt perf ther ex b ue level 4 tband all planes.  Pt perf mob with wc cordoba and donning doffing shoes socks multi times with multi positions for increased perf  pt denied elastic shoe laces and demo increased perf with propping on a stool  Plan of Care Reviewed With: patient  Progress: improving       Outcome Measures     Row Name 05/31/23 1251 05/31/23 0801          10 Meter Walk Test Self-Selected Velocity    Self-Selected Velocity: Trial 1 -- 40 sec.  -LR     Self-Selected Velocity: Trial 2 -- 40 sec.  -LR     Self-Selected Velocity: Trial 3 -- 40 sec.  -LR     Self-Selected Velocity: Average Time -- 40 sec.  -LR        10 Meter Walk Test Actual Velocity    Actual Self-Selected Velocity -- 0.15 m/s  -LR        9 Hole Peg    9-Hole Peg Left 45.88  -RW --     9-Hole Peg Right 51.2  -RW --        How much help from another person do you currently need...    Turning from your back to your side while in flat bed without using bedrails? -- 2  -LR     Moving from lying on back to sitting on the side of a flat bed without bedrails? -- 2  -LR     Moving to and from a bed to a chair (including a wheelchair)? -- 2  -LR     Standing up from a chair using your arms (e.g., wheelchair, bedside chair)? -- 2  -LR     Climbing 3-5 steps with a railing? -- 3  -LR     To walk in hospital room? -- 1  -LR     AM-PAC 6 Clicks Score (PT) -- 12  -LR        How much help from another is currently needed...    Putting on and taking off regular lower body clothing? 2  -RW --     Bathing (including washing, rinsing, and drying) 2  -RW --     Toileting (which includes using  toilet bed pan or urinal) 2  -RW --     Putting on and taking off regular upper body clothing 3  -RW --     Taking care of personal grooming (such as brushing teeth) 4  -RW --     Eating meals 4  -RW --     AM-PAC 6 Clicks Score (OT) 17  -RW --        Functional Assessment    Outcome Measure Options AM-PAC 6 Clicks Daily Activity (OT);9 Hole Peg  -RW 10 Meter Walk;AM-PAC 6 Clicks Basic Mobility (PT)  -LR           User Key  (r) = Recorded By, (t) = Taken By, (c) = Cosigned By    Initials Name Provider Type    LR Phong Weller Physical Therapist    RW Adelita Castellon OT Occupational Therapist                  Time Calculation:      Time Calculation- OT     Row Name 05/31/23 1713 05/31/23 1307          Time Calculation- OT    OT Start Time 1345  -LM 0951  -RW     OT Stop Time 1440  -LM 1038  -RW     OT Time Calculation (min) 55 min  -LM 47 min  -RW     Total Timed Code Minutes- OT 55 minute(s)  -LM --     OT Received On -- 05/31/23  -RW     OT Goal Re-Cert Due Date -- 06/13/23  -RW        Timed Charges    01890 - OT Therapeutic Exercise Minutes 30  -LM --     79316 - OT Self Care/Mgmt Minutes 25  -LM --        Untimed Charges    OT Eval/Re-eval Minutes -- 47  -RW        Total Minutes    Timed Charges Total Minutes 55  -LM --     Untimed Charges Total Minutes -- 47  -RW      Total Minutes 55  -LM 47  -RW           User Key  (r) = Recorded By, (t) = Taken By, (c) = Cosigned By    Initials Name Provider Type     Desirae Brenner COTA Occupational Therapist Assistant    Adelita Whitaker OT Occupational Therapist              Therapy Charges for Today     Code Description Service Date Service Provider Modifiers Qty    98078745453 HC OT THERAPEUTIC ACT EA 15 MIN 5/31/2023 Desirae Brenner COTA GO 2    97271028375 HC OT SELF CARE/MGMT/TRAIN EA 15 MIN 5/31/2023 Desirae Brenner COTA GO 2                   ZIGGY Gordon  5/31/2023

## 2023-05-31 NOTE — NURSING NOTE
PHQ 2-9 completed. Pt reports no social isolation and identifies his wife as his main social support. Pt denies feelings, thoughts of depression at this time.   DC instructions

## 2023-05-31 NOTE — THERAPY EVALUATION
Inpatient Rehabilitation - Physical Therapy Initial Evaluation       [facility]     Patient Name: Emre Lisa  : 1960  MRN: [id number]    Today's Date: 2023                    Admit Date: 2023      Visit Dx:     ICD-10-CM ICD-9-CM   1. Symbolic dysfunction  R48.9 784.60   2. Impaired functional mobility, balance, gait, and endurance  Z74.09 V49.89   3. Impaired mobility and ADLs  Z74.09 V49.89    Z78.9        Patient Active Problem List   Diagnosis   • Foot osteomyelitis, right   • Nodule of groin   • Type 2 diabetes mellitus with skin complication   • Status post transmetatarsal amputation of right foot   • Hammer toe of left foot   • Hallux malleus of left foot   • Cellulitis of left foot   • Infected hardware in left leg   • Chronic multifocal osteomyelitis of left foot   • Charcot's joint of left foot   • Skin ulcer of left foot, limited to breakdown of skin   • Dyspnea   • Syncope   • Acute respiratory failure with hypoxia   • Tachycardia   • Neuropathy due to secondary diabetes mellitus   • Atrial fibrillation   • Presence of biventricular cardiac pacemaker   • Hypertension   • Medically complex patient       Past Medical History:   Diagnosis Date   • Arthritis    • Atrial fibrillation    • Diabetes mellitus    • Diabetic foot ulcer associated with type 2 diabetes mellitus     left great toe   • Hallux malleus of left foot    • Hammer toe    • Hypertension    • MI (myocardial infarction)    • Neuropathy in diabetes    • Onychomycosis    • Presence of biventricular cardiac pacemaker    • Rheumatoid arthritis        Past Surgical History:   Procedure Laterality Date   • AMPUTATION DIGIT Right 2017    Procedure: RIGHT AMPUTATION TRANSMETATRASAL , RECESSION GASTROCNEMOUS;  Surgeon: Marco A Thompson DPM;  Location: [facility];  Service:    • ANKLE FUSION Left 2023    Procedure: REDUCTION OF LEFT ANKLE DEFORMITY WITH APPLICATION OF EXTERNAL FIXATOR;  Surgeon: Marco A Thompson,  CHRISTIAN;  Location: [facility] OR;  Service: Podiatry;  Laterality: Left;   • CARDIAC CATHETERIZATION     • CARDIAC DEFIBRILLATOR PLACEMENT  2010, 2016   • CARPAL TUNNEL RELEASE  2015    elbow and wrist left arm   • FOOT FUSION Left 05/15/2023    Procedure: REMOVAL OF EXTERNAL FIXATOR LEFT LOWER EXTREMITY WITH TIBIAL TALOCALCANEAL ARTHRODESIS  AND ALL INDICATED PROCEDURES;  Surgeon: Marco A Thompson DPM;  Location: [facility] OR;  Service: Podiatry;  Laterality: Left;   • FOOT IRRIGATION, DEBRIDEMENT AND REPAIR Left 03/07/2018    Procedure: FOOT IRRIGATION, DEBRIDEMENT AND REPAIR;  Surgeon: Marco A Thompson DPM;  Location: [facility] OR;  Service:    • FOOT IRRIGATION, DEBRIDEMENT AND REPAIR Left 03/10/2018    Procedure: FOOT IRRIGATION, DEBRIDEMENT AND REPAIR;  Surgeon: Marco A Thompson DPM;  Location: [facility] OR;  Service: Podiatry   • FOOT WOUND CLOSURE Right 03/02/2017    Procedure: INCISION AND DRAINAGE, RIGHT FOOT;  Surgeon: Tung Rosenthal DPM;  Location: [facility] OR;  Service:    • HAMMER TOE REPAIR Left 02/15/2018    Procedure: HAMMERTOE CORRECTION LEFT SECOND, THIRD AND FOURTH TOES AND HALLUX INTERPHALANGEAL JOINT ARTHRODESIS LEFT FOOT (Micro Asnis, 4.0 & 5.0 Asnis)      (c-arm);  Surgeon: Marco A Thompson DPM;  Location: [facility] OR;  Service:    • HARDWARE REMOVAL Left 03/05/2018    Procedure: ANKLE/FOOT HARDWARE REMOVAL;  Surgeon: Marco A Thompson DPM;  Location: [facility] OR;  Service:    • INCISION AND DRAINAGE LEG Left 03/05/2018    Procedure: INCISION AND DRAINAGE LOWER EXTREMITY;  Surgeon: Marco A Thompson DPM;  Location: [facility] OR;  Service:    • PLANTAR FASCIA RELEASE Left 11/21/2019    Procedure: PLANTAR PLANING LEFT FOOT AND ALL OTHER INDICATED PROCEDURES;  Surgeon: Marco A Thompson DPM;  Location: [facility] OR;  Service: Podiatry   • TOE AMPUTATION Right 2016   • TONSILECTOMY, ADENOIDECTOMY, BILATERAL MYRINGOTOMY AND TUBES      age 25       PT ASSESSMENT (last 12 hours)     IRF PT Evaluation and Treatment     Row Name  05/31/23 1315 05/31/23 0951       PT Time and Intention    Document Type daily treatment  -LR initial evaluation  -RW    Mode of Treatment individual therapy;physical therapy  -LR individual therapy;occupational therapy  -RW    Row Name 05/31/23 0801          PT Time and Intention    Document Type initial evaluation  -LR     Mode of Treatment individual therapy;physical therapy  -LR     Row Name 05/31/23 1315 05/31/23 0951       General Information    Patient Profile Reviewed yes  -LR yes  -RW    Existing Precautions/Restrictions fall  -LR fall  -RW    Row Name 05/31/23 0801          General Information    Patient Profile Reviewed yes  -LR     Existing Precautions/Restrictions fall  -LR     Row Name 05/31/23 0951 05/31/23 0801       Previous Level of Function/Home Environm    Bathing/Grooming, Premorbid Functional Level independent  -RW independent  -LR    Dressing, Premorbid Functional Level independent  -RW independent  -LR    Eating/Feeding, Premorbid Functional Level independent  -RW independent  -LR    Toileting, Premorbid Functional Level independent  -RW independent  -LR    BADLs, Premorbid Functional Level independent  -RW independent  -LR    IADLs, Premorbid Functional Level independent  -RW independent  -LR    Bed Mobility, Premorbid Functional Level independent  -RW independent  -LR    Transfers, Premorbid Functional Level -- independent  -LR    Household Ambulation, Premorbid Functional Level -- independent  -LR    Stairs, Premorbid Functional Level -- independent  -LR    Community Ambulation, Premorbid Functional Level -- independent  -LR    Row Name 05/31/23 0951 05/31/23 0801       Living Environment    Current Living Arrangements home  -RW home  -LR    Home Accessibility stairs to enter home  -RW stairs to enter home  -LR    Row Name 05/31/23 0951 05/31/23 0801       Home Main Entrance    Number of Stairs, Main Entrance four  -RW four  -LR    Stair Railings, Main Entrance none  -RW none  -LR    Row  Name 05/31/23 0951 05/31/23 0801       Home Use of Assistive/Adaptive Equipment    Equipment Currently Used at Home other (see comments);cane, quad;walker, standard;glucometer;power chair,(recliner lift);cane, quad tip  knee scooter, walk in shower and no room for a chair; regular toilet  -RW other (see comments);cane, quad;walker, standard;glucometer;power chair,(recliner lift);cane, quad tip  knee scooter  -LR    Row Name 05/31/23 1315 05/31/23 0951       Pain Assessment    Pretreatment Pain Rating 0/10 - no pain  -LR 2/10  -RW    Posttreatment Pain Rating 0/10 - no pain  -LR --    Pain Location - -- head  -RW    Row Name 05/31/23 0801          Pain Assessment    Pretreatment Pain Rating 4/10  -LR     Posttreatment Pain Rating 4/10  -LR     Pain Location - head  -LR     Row Name 05/31/23 1315 05/31/23 0951       Cognition/Psychosocial    Affect/Mental Status (Cognition) WFL  -LR --    Orientation Status (Cognition) oriented x 4  -LR oriented x 4  -RW    Follows Commands (Cognition) WFL  -LR --    Personal Safety Interventions safety round/check completed;elopement precautions initiated;gait belt;fall prevention program maintained;nonskid shoes/slippers when out of bed  -LR --    Row Name 05/31/23 0801          Cognition/Psychosocial    Affect/Mental Status (Cognition) WFL  -LR     Orientation Status (Cognition) oriented x 4  -LR     Follows Commands (Cognition) WFL  -LR     Personal Safety Interventions safety round/check completed;elopement precautions initiated;fall prevention program maintained;gait belt;muscle strengthening facilitated  -LR     Row Name 05/31/23 0801          Range of Motion Comprehensive    Comment, General Range of Motion BLE grossly WFL except L ankle in cam boot from fusion surgery and R knee lacking terminal knee extension  -LR     Row Name 05/31/23 0801          Strength Comprehensive (MMT)    Comment, General Manual Muscle Testing (MMT) Assessment RLE grossly 4/5, LLE hip flexion 4/5  and knee extension 3/5 no resistance due to pain  -LR     Row Name 05/31/23 1315 05/31/23 0910       Mobility    Left Lower Extremity (Weight-bearing Status) -- weight-bearing as tolerated (WBAT)  -LR    Additional Documentation Wheelchair Mobility/Management (Group)  -LR --    Row Name 05/31/23 0801          Mobility    Extremity Weight-bearing Status left lower extremity  -LR     Row Name 05/31/23 1315 05/31/23 0910       Bed Mobility    Bed Mobility rolling left;rolling right  -LR supine-sit;sit-supine  -LR    Rolling Left [place] (Bed Mobility) standby assist  -LR --    Rolling Right [place] (Bed Mobility) standby assist  -LR --    Supine-Sit [place] (Bed Mobility) standby assist  -LR not tested  -LR    Sit-Supine [place] (Bed Mobility) standby assist  -LR not tested  -LR    Row Name 05/31/23 0910          Transfer Assessment/Treatment    Transfers sit-stand transfer;bed-chair transfer  -LR     Row Name 05/31/23 0910          Bed-Chair Transfer    Bed-Chair [place] (Transfers) moderate assist (50% patient effort)  -LR     Assistive Device (Bed-Chair Transfers) walker, 4-wheeled  -LR     Row Name 05/31/23 1315 05/31/23 0910       Sit-Stand Transfer    Sit-Stand [place] (Transfers) contact guard  -LR minimum assist (75% patient effort)  -LR    Assistive Device (Sit-Stand Transfers) walker, front-wheeled  -LR walker, 4-wheeled  -LR    Row Name 05/31/23 1315 05/31/23 0910       Gait/Stairs (Locomotion)    Gait/Stairs Locomotion gait/ambulation independence;gait/ambulation assistive device  -LR gait/ambulation independence;gait/ambulation assistive device  -LR    [place] Level (Gait) contact guard  -LR minimum assist (75% patient effort)  -LR    Assistive Device (Gait) parallel bars  -LR walker, front-wheeled  W/c follow  -LR    Distance in Feet (Gait) 16'x8 fwd/bkwd  -LR 32' and 60'  -LR    Deviations/Abnormal Patterns (Gait) bilateral deviations;amrilee  decreased;festinating/shuffling;gait speed decreased;weight shifting decreased  -LR bilateral deviations;marilee decreased;festinating/shuffling;gait speed decreased;weight shifting decreased  -LR    Bilateral Gait Deviations knee buckling bilaterally;forward flexed posture  -LR knee buckling bilaterally;forward flexed posture  -LR    Row Name 05/31/23 0801          Gait/Stairs (Locomotion)    Negotiation (Stairs) stairs independence;stairs assistive device  -LR     [place] Level (Stairs) minimum assist (75% patient effort)  -LR     Assistive Device (Stairs) walker, front-wheeled  -LR     Handrail Location (Stairs) both sides  -LR     Number of Steps (Stairs) 1 curb step, 4 gym steps  -LR     Ascending Technique (Stairs) step-to-step  -LR     Descending Technique (Stairs) step-to-step  -LR     Stairs, Safety Issues balance decreased during turns;sequencing ability decreased;weight-shifting ability decreased  -LR     Stairs, Impairments ROM decreased;decreased flexibility;impaired balance;coordination impaired;pain  -LR     Row Name 05/31/23 1315          Wheelchair Mobility/Management    Mobility Activities (Wheelchair) mobility (all) activities  -LR     All Mobility, [place] (Wheelchair) independent  -LR     Distance Propelled in Feet (Wheelchair) 150'x1  -LR     Row Name 05/31/23 0910 05/31/23 0801       Safety Issues, Functional Mobility    Safety Issues Affecting Function (Mobility) awareness of need for assistance;insight into deficits/self-awareness;safety precautions follow-through/compliance;safety precaution awareness;positioning of assistive device  -LR ability to follow commands;at risk behavior observed;awareness of need for assistance;friction/shear risk;insight into deficits/self-awareness;positioning of assistive device;safety precaution awareness;safety precautions follow-through/compliance  -LR    Impairments Affecting Function (Mobility) balance;endurance/activity  tolerance;coordination;pain;postural/trunk control;range of motion (ROM);strength  -LR --    Row Name 05/31/23 0910          Positioning and Restraints    Pre-Treatment Position sitting in chair/recliner  -LR     Post Treatment Position wheelchair  -LR     In Wheelchair notified nsg;call light within reach;encouraged to call for assist  -LR     Row Name 05/31/23 0801          Vital Signs    Pre Systolic BP Rehab 114  -LR     Pre Treatment Diastolic BP 70  -LR     Pretreatment Heart Rate (beats/min) 75  -LR     Pre SpO2 (%) 95  -LR     O2 Delivery Pre Treatment room air  -LR     Row Name 05/31/23 0801          Therapy Assessment/Plan (PT)    Patient's Goals For Discharge return home;take care of myself at home  -LR     Row Name 05/31/23 0801          Therapy Assessment/Plan (PT)    Rehab Potential/Prognosis (PT) good, to achieve stated therapy goals  -LR     Frequency of Treatment (PT) --  5-6d/week for 90 minutes  -LR     Estimated Duration of Therapy (PT) 2 weeks  -LR     Problem List (PT) problems related to;balance;coordination;mobility;range of motion (ROM);strength;pain;postural control  -LR     Activity Limitations Related to Problem List (PT) unable to ambulate safely;unable to transfer safely  -LR     Row Name 05/31/23 0801          Daily Progress Summary (PT)    Functional Goal Overall Progress (PT) progressing toward functional goals as expected  -LR           User Key  (r) = Recorded By, (t) = Taken By, (c) = Cosigned By    Initials Name Provider Type    LR Phong Weller Physical Therapist    RW Adelita Castellon OT Occupational Therapist              Wound 05/15/23 1546 Left anterior foot Incision (Active)   Dressing Appearance dry;intact 05/31/23 0701   Closure KO 05/31/23 0701   Drainage Amount none 05/31/23 0701     Physical Therapy Education     Title: PT OT SLP Therapies (In Progress)     Topic: Physical Therapy (Not Started)     Point: Mobility training (Not Started)     Learner Progress:  Not  documented in this visit.          Point: Home exercise program (Not Started)     Learner Progress:  Not documented in this visit.          Point: Body mechanics (Not Started)     Learner Progress:  Not documented in this visit.                            PT Recommendation and Plan    Frequency of Treatment (PT):  (5-6d/week for 90 minutes)     Daily Progress Summary (PT)  Functional Goal Overall Progress (PT): progressing toward functional goals as expected       Outcome Measures     Row Name 05/31/23 1251 05/31/23 0801          10 Meter Walk Test Self-Selected Velocity    Self-Selected Velocity: Trial 1 -- 40 sec.  -LR     Self-Selected Velocity: Trial 2 -- 40 sec.  -LR     Self-Selected Velocity: Trial 3 -- 40 sec.  -LR     Self-Selected Velocity: Average Time -- 40 sec.  -LR        10 Meter Walk Test Actual Velocity    Actual Self-Selected Velocity -- 0.15 m/s  -LR        9 Hole Peg    9-Hole Peg Left 45.88  -RW --     9-Hole Peg Right 51.2  -RW --        How much help from another person do you currently need...    Turning from your back to your side while in flat bed without using bedrails? -- 2  -LR     Moving from lying on back to sitting on the side of a flat bed without bedrails? -- 2  -LR     Moving to and from a bed to a chair (including a wheelchair)? -- 2  -LR     Standing up from a chair using your arms (e.g., wheelchair, bedside chair)? -- 2  -LR     Climbing 3-5 steps with a railing? -- 3  -LR     To walk in hospital room? -- 1  -LR     AM-PAC 6 Clicks Score (PT) -- 12  -LR        How much help from another is currently needed...    Putting on and taking off regular lower body clothing? 2  -RW --     Bathing (including washing, rinsing, and drying) 2  -RW --     Toileting (which includes using toilet bed pan or urinal) 2  -RW --     Putting on and taking off regular upper body clothing 3  -RW --     Taking care of personal grooming (such as brushing teeth) 4  -RW --     Eating meals 4  -RW --      AM-PAC 6 Clicks Score (OT) 17  -RW --        Functional Assessment    Outcome Measure Options AM-PAC 6 Clicks Daily Activity (OT);9 Hole Peg  -RW 10 Meter Walk;AM-PAC 6 Clicks Basic Mobility (PT)  -LR           User Key  (r) = Recorded By, (t) = Taken By, (c) = Cosigned By    Initials Name Provider Type    LR Phong Weller Physical Therapist    RW Adelita Castellon, OT Occupational Therapist                     Time Calculation:      PT Charges     Row Name 05/31/23 1502 05/31/23 1122          Time Calculation    Start Time 1315  -LR 0800  -LR     Stop Time 1345  -LR 0900  -LR     Time Calculation (min) 30 min  -LR 60 min  -LR     PT Received On 05/31/23  -LR 05/31/23  -LR     PT Goal Re-Cert Due Date -- 06/13/23  -LR        Time Calculation- PT    Total Timed Code Minutes- PT 30 minute(s)  -LR --        Timed Charges    92715 - PT Therapeutic Activity Minutes 30  -LR --        Untimed Charges    PT Eval/Re-eval Minutes -- 60  -LR        Total Minutes    Timed Charges Total Minutes 30  -LR --     Untimed Charges Total Minutes -- 60  -LR      Total Minutes 30  -LR 60  -LR           User Key  (r) = Recorded By, (t) = Taken By, (c) = Cosigned By    Initials Name Provider Type    LR Phong Weller Physical Therapist                Therapy Charges for Today     Code Description Service Date Service Provider Modifiers Qty    33224993891 HC PT EVAL MOD COMPLEXITY 4 5/31/2023 Phong Weller GP 1    68421711854 HC PT THERAPEUTIC ACT EA 15 MIN 5/31/2023 Phong Weller GP 2            PT G-Codes  Outcome Measure Options: AM-PAC 6 Clicks Daily Activity (OT), 9 Hole Peg  AM-PAC 6 Clicks Score (PT): 12  AM-PAC 6 Clicks Score (OT): 17    Mobility  Admission Goal Date Discharge   A.Roll left and right 4 6     B.Sit to lying 4 6     C. Lying to sitting on side of bed 4 6     D.Sit to stand 2 6     E. Chair/bed-to-chair transfer   2 6     F.Toilet transfer    2 6     G. Car Transfer 88 88     I.Walk 10 feet 1 6     J.Walk 50 feet  with two turns. 1 6     K.Walk 150 feet:  88 4     L.Walking 10 feet on uneven surfaces  88 4     M.1 step (curb) 3 4     N.4 steps 3 4     O.12 steps 88 88     P.Picking up object 88 6     R. Wheel 50 feet with two turns 6 6     S.Wheel 150 feet 6 6         Phong Weller  5/31/2023

## 2023-05-31 NOTE — PLAN OF CARE
Goal Outcome Evaluation:           Progress: improving  Outcome Evaluation: ST: evaluation complete.  no tx indicated at this time.  Bims 15/15, SLUMSD 27/30

## 2023-05-31 NOTE — PROGRESS NOTES
Progress Note and 24 hour plan of care       Admit date: 5/30/2023  2:34 PM       Treatment Team     Provider Relationship Specialty Contact    Javi Alfaro MD Attending Orthopedic Surgery   [phone]      Ifeoma Leung, Nursing Student Nursing Student -- --    Marco A Thompson DPM Consulting Physician Podiatry   [phone]      Oanh Marcial LPN Licensed Practical Nurse -- --    Dick Valdes BSW  -- --    Diana Willett OT Student Occupational Therapy student Occupational Therapy --    Phong Weller Physical Therapist Physical Therapy --    Adelita Castellon OT Occupational Therapist Occupational Therapy --             Chief Complaint:  Neuropathy due to secondary diabetes mellitus    HPI: No changes since admission    Vital Signs  Temp:  [96.8 °F (36 °C)-98 °F (36.7 °C)] 98 °F (36.7 °C)  Heart Rate:  [88-97] 88  Resp:  [16-18] 18  BP: (112-132)/(70-78) 112/70    Physical Exam 1:    Constitutional: Alert and stable had good therapy session good OT speech therapy this morning and will continue this afternoon     HENT:      Eyes:     Neck:      Cardiovascular: No chest pain    Pulmonary: No cough sputum production    Abdominal:      Genitourinary/Anorectal:       Musculoskeletal: Wearing his boot appropriately patient said his peripheral circulation is good he does not know what his ABIs were but according to him Lito Dr. Thompson is nurse practitioner says they were fine and therefore the indication for the surgery was dislocated metatarsal tarsal joints because of the Charcot joint because of the neuropathy using external fixator to reduce the deformity and then fixation with screws in the metatarsal shafts into the midfoot area    Skin:     Neurological: Neuropathy sensibility decreased up to about mid calf area    Psychiatric/Behavioral: Cooperative physical therapy     Results Review:    I reviewed the patient's new clinical results.    Nursing notes and therapy notes have been  reviewed.    I have reviewed medications.    Assessment: Despite the patient being here a week his goal is independent ambulation at home he is to be able to walk up stairs but is able to bear weight on his leg with his brace on patient's neuropathy has affected his ability to ambulate and certainly sensibility of his feet feedback mechanisms cause instability and that is being improved by practice destination for discharge is home length of stay is approximately a week patient will basically need physical therapy occupational therapy but not much any if any speech therapy other than evaluation and then either home therapy because his wife works and is not able to get him to therapy if not did not discuss driving at this point time    Prior level of function          Risk for clinical complications                                              Duration of treatment  Expected improvement       Condition that caused the need for rehabilitation                Anticipated discharge destination  Expected length of stay      Combination of treatments needed in the IRF                     Anticipated post discharge treatment       Plan and benefits:  Patient to benefit from skilled PT to address exercise endurance, safety in self-care, and functional mobility.     Patient to benefit from skilled Occupational Therapy to address activities of daily living, functional mobility, fine motor, gross motor, endurance, safety in self-care, and activity tolerance.     Patient to benefit from skilled Speech Therapy to address dysphagia, communication deficits and cognitive-linguistic communication disorder.     Pt should be able to participate up to 3 hours per day and make measurable improvements and be able to return home post discharge.    Individual therapy evaluation plans will be programmed for the patient and altered as necessary.     There has been no change in the last 24 hours.    Assessment/Plan  Principal Problem:     Neuropathy due to secondary diabetes mellitus indication for admission  Active Problems: All other conditions are stable are under treatment    Type 2 diabetes mellitus with skin complication    Charcot's joint of left foot    Atrial fibrillation    Presence of biventricular cardiac pacemaker    Hypertension         Javi Alfaro MD  05/31/23  12:06 CDT          This document has been electronically signed by Javi Alfaro MD on May 31, 2023 12:06 CDT       This document has been electronically signed by Javi Alfaro MD on May 31, 2023 12:06 CDT      Part of this note may be an electronic transcription/translation of spoken language to printed text using the Dragon Dictation System.

## 2023-05-31 NOTE — PLAN OF CARE
Problem: Rehabilitation (IRF) Plan of Care  Goal: Plan of Care Review  Outcome: Ongoing, Progressing  Flowsheets (Taken 5/31/2023 6639)  Progress: no change  Plan of Care Reviewed With: patient  Outcome Evaluation: vss. dressings dry and intact. complains of not being able to sleep in bed, tried to adjust the room and bed for comfort. LLE elevated with blue wedge. no complaints of pain.   Goal Outcome Evaluation:  Plan of Care Reviewed With: patient        Progress: no change  Outcome Evaluation: vss. dressings dry and intact. complains of not being able to sleep in bed, tried to adjust the room and bed for comfort. LLE elevated with blue wedge. no complaints of pain.

## 2023-05-31 NOTE — THERAPY EVALUATION
"Inpatient Rehabilitation - Speech Language Pathology Initial Evaluation    [facility] [place]     Patient Name: Emre Lisa  : 1960  MRN: [id number]    Today's Date: 2023                   Admit Date: 2023    Cognitive Patterms Admission   Should Brief Interview for Mental Status (-) be Conducted?    0.No (patient is rarely/never understood) Skip to , Memory/Recall Ability     1.Yes Continue to , Repetition of Three Words    BRIEF INTERVIEW FOR MENTAL STATUS    Repetition of Three Words    Ask patient: “I am going to say three words for you to remember. Please repeat the words after I have said all three. The words are: sock, blue and bed. Now tell me the three words.”    Number of words repeated after first attempt     3.Three 2. Two  1.One  0.None 3   After the patient's first attempt, repeat the words using cues (\"sock, something to wear; blue, a color; bed, a piece of furniture\"). You may repeat the words up to two more times.    Temporal Orientation (orientation to year, month, and day)    Ask patient: “Please tell me what year it is right now.”    Able to report correct year  3. Correct    2.Missed by 1 year    1.Missed by 2 - 5 years 0.Missed by > 5 years or no answer 3   Ask patient: “What month are we in right now?”   Able to report correct month 2. Accurate within 5 days 1. Missed by 6 days to 1 month 0.Missed by > 1 month or no answer 2   Ask patient: “What day of the week is today?”    Able to report correct day of the week 1. Correct     0. Incorrect or no answer 1   Recall     Ask patient: “Let's go back to an earlier question. What were those three words that I asked you to repeat?” If unable to remember a word, give cue (something to wear; a color; a piece of furniture) for that word. Able to recall “sock” 2.Yes, no cue required  1.Yes, after cueing (\"something to wear\")  0.No - could not recall 2   Able to recall “blue” 2. Yes, no cue required 1.Yes, after " "cueing (\"a color\") 0.No - could not recall 2   Able to recall “bed” 2. Yes, no cue required1.Yes, after cueing (\"a piece of furniture\") 0.No - could not recall 2   BIMS TOTAL SCORE 0-15  15   Enter 99 IF Patient unable to complete the interview    Should the Staff Assessment for Mental Status () be Conducted? (Yes or No)    Staff Assessment for Mental Status    (Do not conduct if Brief Inerview for Mental Status was completed)    Memory Recall/Ability Check all that Apply:    A.Current season.    B.Location of own room.    C.Staff names and faces.    E.That he or she is in a hospital/hospital unit.    Z.None of the above were recalled.            Hearing, Speech and Vision Admission  5/31 Goal  Date Date Date Date Date Discharge   Expression of Ideas and Wants (consider both verbal and non-verbal expression and excluding language barriers) 4 4         4.Expresses complex messages without difficulty and with speech that is clear and easy to understand.           3.Exhibits some difficulty with expressing needs and ideas (e.g., some words or finishing thoughts) or speech is not clear.           2.Frequently exhibits difficulty with expressing needs and ideas.           1.Rarely/Never expresses self or speech is very difficult to understand.           Understanding Verbal and Non-Verbal Content (with hearing aid or device, if used, and excluding language barriers) 4 4         4.Understands: Clear comprehension without cues or repetitions.           3.Usually Understands: Understands most conversations, but misses some part/intent of message. Requires cues at times to understand           2.Sometimes Understands: Understands only basic conversations or simple, direct phrases. Frequently requires cues to understand           1.Rarely/Never Understands                  Visit Dx:      ICD-10-CM ICD-9-CM   1. Symbolic dysfunction  R48.9 784.60       Patient Active Problem List   Diagnosis   • Foot osteomyelitis, right "   • Nodule of groin   • Type 2 diabetes mellitus with skin complication   • Status post transmetatarsal amputation of right foot   • Hammer toe of left foot   • Hallux malleus of left foot   • Cellulitis of left foot   • Infected hardware in left leg   • Chronic multifocal osteomyelitis of left foot   • Charcot's joint of left foot   • Skin ulcer of left foot, limited to breakdown of skin   • Dyspnea   • Syncope   • Acute respiratory failure with hypoxia   • Tachycardia   • Neuropathy due to secondary diabetes mellitus   • Atrial fibrillation   • Presence of biventricular cardiac pacemaker   • Hypertension       Past Medical History:   Diagnosis Date   • Arthritis    • Atrial fibrillation    • Diabetes mellitus    • Diabetic foot ulcer associated with type 2 diabetes mellitus     left great toe   • Hallux malleus of left foot    • Hammer toe    • Hypertension    • MI (myocardial infarction)    • Neuropathy in diabetes    • Onychomycosis    • Presence of biventricular cardiac pacemaker    • Rheumatoid arthritis        Past Surgical History:   Procedure Laterality Date   • AMPUTATION DIGIT Right 03/05/2017    Procedure: RIGHT AMPUTATION TRANSMETATRASAL , RECESSION GASTROCNEMOUS;  Surgeon: Marco A Thompson DPM;  Location: [facility];  Service:    • ANKLE FUSION Left 04/13/2023    Procedure: REDUCTION OF LEFT ANKLE DEFORMITY WITH APPLICATION OF EXTERNAL FIXATOR;  Surgeon: Marco A Thompson DPM;  Location: [facility] OR;  Service: Podiatry;  Laterality: Left;   • CARDIAC CATHETERIZATION     • CARDIAC DEFIBRILLATOR PLACEMENT  2010, 2016   • CARPAL TUNNEL RELEASE  2015    elbow and wrist left arm   • FOOT FUSION Left 05/15/2023    Procedure: REMOVAL OF EXTERNAL FIXATOR LEFT LOWER EXTREMITY WITH TIBIAL TALOCALCANEAL ARTHRODESIS  AND ALL INDICATED PROCEDURES;  Surgeon: Marco A Thompson DPM;  Location: [facility];  Service: Podiatry;  Laterality: Left;   • FOOT IRRIGATION, DEBRIDEMENT AND REPAIR Left 03/07/2018    Procedure: FOOT  IRRIGATION, DEBRIDEMENT AND REPAIR;  Surgeon: Marco A Thompson DPM;  Location: [facility] OR;  Service:    • FOOT IRRIGATION, DEBRIDEMENT AND REPAIR Left 03/10/2018    Procedure: FOOT IRRIGATION, DEBRIDEMENT AND REPAIR;  Surgeon: Marco A Thompson DPM;  Location: [facility] OR;  Service: Podiatry   • FOOT WOUND CLOSURE Right 03/02/2017    Procedure: INCISION AND DRAINAGE, RIGHT FOOT;  Surgeon: Tung Rosenthal DPM;  Location: [facility] OR;  Service:    • HAMMER TOE REPAIR Left 02/15/2018    Procedure: HAMMERTOE CORRECTION LEFT SECOND, THIRD AND FOURTH TOES AND HALLUX INTERPHALANGEAL JOINT ARTHRODESIS LEFT FOOT (Micro Asnis, 4.0 & 5.0 Asnis)      (c-arm);  Surgeon: Marco A Thompson DPM;  Location: [facility] OR;  Service:    • HARDWARE REMOVAL Left 03/05/2018    Procedure: ANKLE/FOOT HARDWARE REMOVAL;  Surgeon: Marco A Thompson DPM;  Location: [facility] OR;  Service:    • INCISION AND DRAINAGE LEG Left 03/05/2018    Procedure: INCISION AND DRAINAGE LOWER EXTREMITY;  Surgeon: Marco A Thompson DPM;  Location: [facility] OR;  Service:    • PLANTAR FASCIA RELEASE Left 11/21/2019    Procedure: PLANTAR PLANING LEFT FOOT AND ALL OTHER INDICATED PROCEDURES;  Surgeon: Marco A Thompson DPM;  Location: [facility] OR;  Service: Podiatry   • TOE AMPUTATION Right 2016   • TONSILECTOMY, ADENOIDECTOMY, BILATERAL MYRINGOTOMY AND TUBES      age 25       SLP Recommendation and Plan    SLP Diagnosis: functional speech/language skills, functional cognitive-linguistic skills (05/31/23 0910)        Rehab Potential/Prognosis: excellent (05/31/23 0910)     Anticipated Discharge Disposition (SLP): home (05/31/23 0910)                       Progress: improving (05/31/23 1002)  Outcome Evaluation: ST: evaluation complete.  no tx indicated at this time.  Bims 15/15, SLUMSD 27/30 (05/31/23 1002)                SLP EVALUATION (last 72 hours)     SLP SLC Evaluation     Row Name 05/31/23 0910                   Communication Assessment/Intervention    Document Type discharge  evaluation/summary  -EC        Subjective Information no complaints  -EC        Patient Observations alert;cooperative;agree to therapy  -EC        Patient/Family/Caregiver Comments/Observations none  -EC        Patient Effort excellent  -EC           General Information    Patient Profile Reviewed yes  -EC        Pertinent History Of Current Problem admitted from LTACH for deconditioning with left ankle fx s/p fixator  -EC        Precautions/Limitations, Vision WFL with corrective lenses  -EC        Precautions/Limitations, Hearing WFL  -EC        Patient Level of Education associates degree  -EC        Prior Level of Function-Communication WFL  -EC        Plans/Goals Discussed with patient  -EC        Barriers to Rehab none identified  -EC        Patient's Goals for Discharge return to home  -EC        Standardized Assessment Used SLUMS  -EC           Pain    Additional Documentation Pain Scale: Numbers Pre/Post-Treatment (Group)  -EC           Pain Scale: Numbers Pre/Post-Treatment    Pretreatment Pain Rating 0/10 - no pain  -EC        Posttreatment Pain Rating 0/10 - no pain  -EC           Standardized Tests    Cognitive/Memory Tests SLUMS: [facility] Mental Status Examination  -EC           SLUMS: [facility] Mental Status Examination    SLUMS Score 27  -EC        SLUMS Range 27-30: Normal (High school education or higher)  -EC           SLP Evaluation Clinical Impressions    SLP Diagnosis functional speech/language skills;functional cognitive-linguistic skills  -EC        Rehab Potential/Prognosis excellent  -EC        SLC Criteria for Skilled Therapy Interventions Met not appropriate;no problems identified which require skilled intervention  -EC        Functional Impact no impact on function  -EC           Recommendations    Therapy Frequency (SLP SLC) evaluation only  -EC        Anticipated Discharge Disposition (SLP) home  -EC              User Key  (r) = Recorded By, (t) = Taken By, (c)  = Cosigned By    Initials Name Effective Dates    EC Bernadine Larsen CCC-SLP 05/31/23 -                    EDUCATION    The patient has been educated in the following areas:       Cognitive Impairment.                            Time Calculation:        Time Calculation- SLP     Row Name 05/31/23 1003             Time Calculation- SLP    SLP Start Time 0910  -EC      SLP Stop Time 0950  -EC      SLP Time Calculation (min) 40 min  -EC      Total Timed Code Minutes- SLP 40 minute(s)  -EC      SLP Received On 05/31/23  -EC         Untimed Charges    SLP Eval/Re-eval  ST Eval Speech and Production w/ Language - 39922  -EC      [id number] Eval Speech and Production w/ Language Minutes 40  -EC         Total Minutes    Untimed Charges Total Minutes 40  -EC       Total Minutes 40  -EC            User Key  (r) = Recorded By, (t) = Taken By, (c) = Cosigned By    Initials Name Provider Type    EC Bernadine Larsen CCC-SLP Speech and Language Pathologist                  Therapy Charges for Today     Code Description Service Date Service Provider Modifiers Qty    [id number] HC ST EVAL SPEECH AND PROD W LANG  3 5/31/2023 Bernadine Larsen CCC-SLP GN 1                           LEMUEL Grahma  5/31/2023

## 2023-05-31 NOTE — PLAN OF CARE
Goal Outcome Evaluation:  Plan of Care Reviewed With: patient           Outcome Evaluation: OT eval complete. Pt sitting in w/c upon arrival and agreeable to therapy. Pt reported B shld injuries, however able to perform AROM of B shld WFL, B elbow\/wrist/digits WFL. Pts BUE MMT as follows: R shld flex/exten not tested d/t prior injuries; L shld flex/exten 4/5 grossly; B elbow flex/exten and  4/5 grossly. Pt perf B hand dynamometer, R hand as 24,25,22kg and L hand as 17,16,15kg. Pt perf 9 hole peg test, R hand as 51.2 seconds and L hand as 45.88 seconds. Pt is R hand dominant. Pt perf sit<>stand, chair>bed, and toilet transfer with CGA, grab bars, and walker.  Pt dependent for clothing management and perineal hygiene. Pt perf sit>sup with SBA. Pt demonstrated decreased independence with ADLs, strength, and transfer safety. Cont inpatient OT.

## 2023-05-31 NOTE — PLAN OF CARE
Goal Outcome Evaluation:                     PT eval completed. [facility] Attempted knee scooter with patient but just kneeling on LLE causes pain. Patient was mostly minAx1 for sit<>Stand transfer but did have one episode of to much forward lean during sitting requiring ModAx1 to correct. MinAx1 +w/c follow for ambulation 32'x1 and 60'x1 with RW. MinAx1 for up down 1 curb steps and up/down 4 gym steps.      PT pm session completed. Patient Marco for w/c mobility 150'x1. SBA for rolling L/R and supine<>sit transfer in bed with HOB down and no bed rails. CGA for sit<>stand and bed<>chair transfer with RW. CGA for ambulation in parallel bars 16'x8 fwd/bkwd. Patient with increased R knee buckling as trials progress but was about to support himself with his BUE. Good progress towards goals.          10 meter walk test (10MWT):     Pt completed the 10MWT at self selected velocity in 0.15 m/s.    < 0.4 m/s would classify them as a household ambulator (?0.49 in stroke).   0.4-0.8 m/s would classify them as a limited community ambulator.   > 0.8 m/s would classify them as a community ambulator (? 0.93 in stroke).   > 1.3 m/s would classify them as able to safely navigate a crosswalk.   <0.6-0.7 would classify them as at an increased risk for adverse events including falls, hospitalization, & death.

## 2023-05-31 NOTE — PROGRESS NOTES
[facility]  INPATIENT WOUND & OSTOMY CARE    PROGRESS NOTE    Today's Date: 05/31/23    Patient Name: Emre Lisa  MRN: [id number]  [facility]: [id number]  PCP: Jamaal Bravo MD  Referring Provider: Javi Alfaro MD  Attending Provider: Javi Alfaro MD  Length of Stay: 1    SUBJECTIVE   Chief Complaint: left lower extremity wound    HPI: Emre Lisa is a 63 year old male I was consulted on for wound care/assessment. Patient is postop from Charcot surgery 6 weeks ago with Dr. Thompson. Patient recently transferred [facility] ARU from [facility]. He has wound to left lateral foot measuring 2.5 cm x 2 cm with granulation tissue noted. Patient also has open wound to left lower extremity measuring 3.5 cm x 4 cm with granulation tissue noted. Patient also has stage II pressure injury to coccyx. Wound bed granulated. Wounds present on admission.      Visit Dx:    ICD-10-CM ICD-9-CM   1. Symbolic dysfunction  R48.9 784.60   2. Impaired functional mobility, balance, gait, and endurance  Z74.09 V49.89   3. Impaired mobility and ADLs  Z74.09 V49.89    Z78.9        Hospital Problem List:     Neuropathy due to secondary diabetes mellitus    Type 2 diabetes mellitus with skin complication    Charcot's joint of left foot    Atrial fibrillation    Presence of biventricular cardiac pacemaker    Hypertension    Medically complex patient      History:   Past Medical History:   Diagnosis Date   • Arthritis    • Atrial fibrillation    • Diabetes mellitus    • Diabetic foot ulcer associated with type 2 diabetes mellitus     left great toe   • Hallux malleus of left foot    • Hammer toe    • Hypertension    • MI (myocardial infarction)    • Neuropathy in diabetes    • Onychomycosis    • Presence of biventricular cardiac pacemaker    • Rheumatoid arthritis      Past Surgical History:   Procedure Laterality Date   • AMPUTATION DIGIT Right 03/05/2017    Procedure: RIGHT AMPUTATION TRANSMETATRASAL , RECESSION  GASTROCNEMOUS;  Surgeon: Marco A Thompson DPM;  Location: [facility] OR;  Service:    • ANKLE FUSION Left 04/13/2023    Procedure: REDUCTION OF LEFT ANKLE DEFORMITY WITH APPLICATION OF EXTERNAL FIXATOR;  Surgeon: Marco A Thompson DPM;  Location: [facility] OR;  Service: Podiatry;  Laterality: Left;   • CARDIAC CATHETERIZATION     • CARDIAC DEFIBRILLATOR PLACEMENT  2010, 2016   • CARPAL TUNNEL RELEASE  2015    elbow and wrist left arm   • FOOT FUSION Left 05/15/2023    Procedure: REMOVAL OF EXTERNAL FIXATOR LEFT LOWER EXTREMITY WITH TIBIAL TALOCALCANEAL ARTHRODESIS  AND ALL INDICATED PROCEDURES;  Surgeon: Marco A Thompson DPM;  Location: [facility] OR;  Service: Podiatry;  Laterality: Left;   • FOOT IRRIGATION, DEBRIDEMENT AND REPAIR Left 03/07/2018    Procedure: FOOT IRRIGATION, DEBRIDEMENT AND REPAIR;  Surgeon: aMrco A Thompson DPM;  Location: [facility] OR;  Service:    • FOOT IRRIGATION, DEBRIDEMENT AND REPAIR Left 03/10/2018    Procedure: FOOT IRRIGATION, DEBRIDEMENT AND REPAIR;  Surgeon: Marco A Thompson DPM;  Location: [facility] OR;  Service: Podiatry   • FOOT WOUND CLOSURE Right 03/02/2017    Procedure: INCISION AND DRAINAGE, RIGHT FOOT;  Surgeon: Tung Rosenthal DPM;  Location: [facility] OR;  Service:    • HAMMER TOE REPAIR Left 02/15/2018    Procedure: HAMMERTOE CORRECTION LEFT SECOND, THIRD AND FOURTH TOES AND HALLUX INTERPHALANGEAL JOINT ARTHRODESIS LEFT FOOT (Micro Asnis, 4.0 & 5.0 Asnis)      (c-arm);  Surgeon: Marco A Thompson DPM;  Location: [facility] OR;  Service:    • HARDWARE REMOVAL Left 03/05/2018    Procedure: ANKLE/FOOT HARDWARE REMOVAL;  Surgeon: Marco A Thompson DPM;  Location: [facility] OR;  Service:    • INCISION AND DRAINAGE LEG Left 03/05/2018    Procedure: INCISION AND DRAINAGE LOWER EXTREMITY;  Surgeon: Marco A Thompson DPM;  Location: [facility] OR;  Service:    • PLANTAR FASCIA RELEASE Left 11/21/2019    Procedure: PLANTAR PLANING LEFT FOOT AND ALL OTHER INDICATED PROCEDURES;  Surgeon: Marco A Thompson DPM;  Location:  [facility] OR;  Service: Podiatry   • TOE AMPUTATION Right 2016   • TONSILECTOMY, ADENOIDECTOMY, BILATERAL MYRINGOTOMY AND TUBES      age 25     Social History     Socioeconomic History   • Marital status:    Tobacco Use   • Smoking status: Never   • Smokeless tobacco: Never   Vaping Use   • Vaping Use: Never used   Substance and Sexual Activity   • Alcohol use: Yes     Comment: social, once every three months   • Drug use: No   • Sexual activity: Defer       Allergies:  Allergies   Allergen Reactions   • Heparin Other (See Comments)     Heparin induce thrombocytopenia    • Januvia [Sitagliptin] Hives   • Lipitor [Atorvastatin] Other (See Comments)     Muscle Cramps...   • Shellfish Allergy Swelling and Other (See Comments)     Age 13 this occurred doesn't remember what he ate swelled lips and face   • Sulfa Antibiotics Rash     Pt was age of 4 when he was told by his family he had a reaction, pt is unsure        Medications:    Current Facility-Administered Medications:   •  acetaminophen (TYLENOL) tablet 650 mg, 650 mg, Oral, Q6H PRN, Javi Alfaro MD, 650 mg at 05/31/23 1506  •  apixaban (ELIQUIS) tablet 5 mg, 5 mg, Oral, Q12H, Javi Alfaro MD, 5 mg at 05/31/23 0807  •  ascorbic acid (VITAMIN C) tablet 1,000 mg, 1,000 mg, Oral, Nightly, Javi Alfaro MD, 1,000 mg at 05/30/23 2023  •  bisacodyl (DULCOLAX) suppository 10 mg, 10 mg, Rectal, Daily PRN **OR** docusate sodium (ENEMEEZ) enema 1 enema, 1 enema, Rectal, Daily PRN, Javi Alfaro MD  •  calcium carbonate (TUMS) chewable tablet 500 mg (200 mg elemental), 2 tablet, Oral, BID PRN, Javi Alfaro MD  •  cetirizine (zyrTEC) tablet 10 mg, 10 mg, Oral, Daily, Javi Alfaro MD, 10 mg at 05/31/23 0807  •  docusate sodium (COLACE) capsule 100 mg, 100 mg, Oral, BID, Javi Alfaro MD  •  dronedarone (MULTAQ) tablet 400 mg, 400 mg, Oral, Daily With Dinner, Javi Alfaro MD, 400 mg at 05/30/23 1746  •  fluticasone (FLONASE) 50 MCG/ACT nasal  spray 2 spray, 2 spray, Each Nare, Daily, Javi Alfaro MD, 2 spray at 05/31/23 0808  •  glipizide (GLUCOTROL) tablet 10 mg, 10 mg, Oral, QAM AC, Javi Alfaro MD, 10 mg at 05/31/23 0640  •  Insulin Aspart (novoLOG) injection 2-12 Units, 2-12 Units, Subcutaneous, 4x Daily With Meals & Nightly, Javi Alfaro MD, 2 Units at 05/31/23 1124  •  magnesium hydroxide (MILK OF MAGNESIA) suspension 10 mL, 10 mL, Oral, Daily PRN, Javi Alfaro MD  •  magnesium oxide (MAG-OX) tablet 400 mg, 400 mg, Oral, BID, Javi Alfaro MD, 400 mg at 05/31/23 0807  •  melatonin tablet 5 mg, 5 mg, Oral, Nightly PRN, Javi Alfaro MD  •  metoprolol succinate XL (TOPROL-XL) 24 hr tablet 25 mg, 25 mg, Oral, BID, Javi Alfaro MD, 25 mg at 05/31/23 0807  •  ondansetron (ZOFRAN) tablet 4 mg, 4 mg, Oral, Q6H PRN, Javi Alfaro MD  •  oxyCODONE-acetaminophen (PERCOCET) 7.5-325 MG per tablet 1 tablet, 1 tablet, Oral, Q4H PRN, Javi Alfaro MD  •  simethicone (MYLICON) chewable tablet 80 mg, 80 mg, Oral, Q4H PRN, Javi Alfaro MD  •  sodium bicarbonate tablet 650 mg, 650 mg, Oral, TID, Javi Alfaro MD, 650 mg at 05/31/23 0807  •  tamsulosin (FLOMAX) 24 hr capsule 0.4 mg, 0.4 mg, Oral, BID, Javi Alfaro MD, 0.4 mg at 05/31/23 0807  •  traZODone (DESYREL) tablet 50 mg, 50 mg, Oral, Nightly PRN, Javi Alfaro MD  •  vitamin D3 tablet 5,000 Units, 5,000 Units, Oral, Nightly, Javi Alfaro MD, 5,000 Units at 05/30/23 2022    Review of Systems:    Review of Systems      OBJECTIVE     Vitals:    05/31/23 0701   BP: 112/70   Pulse: 88   Resp: 18   Temp: 98 °F (36.7 °C)   SpO2: 96%       PHYSICAL EXAM  Pt presents .  Physical Exam  Physical Exam  Vitals reviewed. Nursing notes reviewed.  Constitutional:       Appearance: Well-developed.   Skin:     General: Skin is warm and dry.      Coloration: Skin is not pale.      Findings: No erythema or rash. Multiple left lower extremity wounds  Neurological:      Mental  Status: Pt is alert and oriented to person, place, and time.   Psychiatric:         Behavior: Behavior normal.         Thought Content: Thought content normal.         Judgment: Judgment normal.       Results Review:  Lab Results (last 48 hours)     Procedure Component Value Units Date/Time    POC Glucose Once [[id number]]  (Abnormal) Collected: 05/31/23 1114    Specimen: Blood Updated: 05/31/23 1127     Glucose 158 mg/dL      Comment: RN NotifiedOperator: [id number] JOHAN Singh ID: [id number]       POC Glucose Once [[id number]]  (Abnormal) Collected: 05/31/23 0632    Specimen: Blood Updated: 05/31/23 0644     Glucose 163 mg/dL      Comment: : [id number] LEONEL VALERIOMeter ID: [id number]           Imaging Results (Last 72 Hours)     ** No results found for the last 72 hours. **             ASSESSMENT/PLAN       Examination and evaluation of wound(s) was performed.    DIAGNOSIS:     Edema  Unspecified open wound, left lower extremity  Charcot ankle  Stage II pressure injury, coccyx      PLAN:     Replaced unnaboot for edema control. Doing well otherwise.   Offload to prevent further breakdown. Can use zinc guard.  Call with questions.    Discussed findings and treatment plan including risks, benefits, and treatment options with patient in detail. Patient agreed with treatment plan.        This document has been electronically signed by WALTER Duncan on May 31, 2023 15:28 CDT

## 2023-05-31 NOTE — NURSING NOTE
[facility] Brochure and Data Collection Summary for Patients in Inpatient Rehab Facilities given to patient.

## 2023-06-01 LAB
ALBUMIN SERPL-MCNC: 3.8 G/DL (ref 3.5–5.2)
ALBUMIN/GLOB SERPL: 1.1 G/DL
ALP SERPL-CCNC: 131 U/L (ref 39–117)
ALT SERPL W P-5'-P-CCNC: 21 U/L (ref 1–41)
ANION GAP SERPL CALCULATED.3IONS-SCNC: 13 MMOL/L (ref 5–15)
AST SERPL-CCNC: 21 U/L (ref 1–40)
BASOPHILS # BLD AUTO: 0.04 10*3/MM3 (ref 0–0.2)
BASOPHILS NFR BLD AUTO: 0.7 % (ref 0–1.5)
BILIRUB SERPL-MCNC: 0.7 MG/DL (ref 0–1.2)
BUN SERPL-MCNC: 25 MG/DL (ref 8–23)
BUN/CREAT SERPL: 24.3 (ref 7–25)
CALCIUM SPEC-SCNC: 9 MG/DL (ref 8.6–10.5)
CHLORIDE SERPL-SCNC: 99 MMOL/L (ref 98–107)
CO2 SERPL-SCNC: 23 MMOL/L (ref 22–29)
CREAT SERPL-MCNC: 1.03 MG/DL (ref 0.76–1.27)
DEPRECATED RDW RBC AUTO: 46.5 FL (ref 37–54)
EGFRCR SERPLBLD CKD-EPI 2021: 82.6 ML/MIN/1.73
EOSINOPHIL # BLD AUTO: 0.15 10*3/MM3 (ref 0–0.4)
EOSINOPHIL NFR BLD AUTO: 2.6 % (ref 0.3–6.2)
ERYTHROCYTE [DISTWIDTH] IN BLOOD BY AUTOMATED COUNT: 14.6 % (ref 12.3–15.4)
GLOBULIN UR ELPH-MCNC: 3.4 GM/DL
GLUCOSE BLDC GLUCOMTR-MCNC: 127 MG/DL (ref 70–130)
GLUCOSE BLDC GLUCOMTR-MCNC: 166 MG/DL (ref 70–130)
GLUCOSE BLDC GLUCOMTR-MCNC: 171 MG/DL (ref 70–130)
GLUCOSE BLDC GLUCOMTR-MCNC: 194 MG/DL (ref 70–130)
GLUCOSE SERPL-MCNC: 151 MG/DL (ref 65–99)
HBA1C MFR BLD: 6.7 % (ref 4.8–5.6)
HCT VFR BLD AUTO: 36.6 % (ref 37.5–51)
HGB BLD-MCNC: 11.5 G/DL (ref 13–17.7)
IMM GRANULOCYTES # BLD AUTO: 0.02 10*3/MM3 (ref 0–0.05)
IMM GRANULOCYTES NFR BLD AUTO: 0.4 % (ref 0–0.5)
IRON 24H UR-MRATE: 49 MCG/DL (ref 59–158)
IRON SATN MFR SERPL: 13 % (ref 20–50)
LYMPHOCYTES # BLD AUTO: 1.32 10*3/MM3 (ref 0.7–3.1)
LYMPHOCYTES NFR BLD AUTO: 23.1 % (ref 19.6–45.3)
MAGNESIUM SERPL-MCNC: 2 MG/DL (ref 1.6–2.4)
MCH RBC QN AUTO: 27.4 PG (ref 26.6–33)
MCHC RBC AUTO-ENTMCNC: 31.4 G/DL (ref 31.5–35.7)
MCV RBC AUTO: 87.4 FL (ref 79–97)
MONOCYTES # BLD AUTO: 0.65 10*3/MM3 (ref 0.1–0.9)
MONOCYTES NFR BLD AUTO: 11.4 % (ref 5–12)
NEUTROPHILS NFR BLD AUTO: 3.53 10*3/MM3 (ref 1.7–7)
NEUTROPHILS NFR BLD AUTO: 61.8 % (ref 42.7–76)
NRBC BLD AUTO-RTO: 0 /100 WBC (ref 0–0.2)
PLATELET # BLD AUTO: 227 10*3/MM3 (ref 140–450)
PMV BLD AUTO: 10.3 FL (ref 6–12)
POTASSIUM SERPL-SCNC: 4.5 MMOL/L (ref 3.5–5.2)
PROT SERPL-MCNC: 7.2 G/DL (ref 6–8.5)
RBC # BLD AUTO: 4.19 10*6/MM3 (ref 4.14–5.8)
SODIUM SERPL-SCNC: 135 MMOL/L (ref 136–145)
TIBC SERPL-MCNC: 365 MCG/DL (ref 298–536)
TRANSFERRIN SERPL-MCNC: 245 MG/DL (ref 200–360)
TSH SERPL DL<=0.05 MIU/L-ACNC: 1.28 UIU/ML (ref 0.27–4.2)
WBC NRBC COR # BLD: 5.71 10*3/MM3 (ref 3.4–10.8)

## 2023-06-01 PROCEDURE — 97110 THERAPEUTIC EXERCISES: CPT

## 2023-06-01 PROCEDURE — 83540 ASSAY OF IRON: CPT | Performed by: ORTHOPAEDIC SURGERY

## 2023-06-01 PROCEDURE — 80053 COMPREHEN METABOLIC PANEL: CPT | Performed by: ORTHOPAEDIC SURGERY

## 2023-06-01 PROCEDURE — 85025 COMPLETE CBC W/AUTO DIFF WBC: CPT | Performed by: ORTHOPAEDIC SURGERY

## 2023-06-01 PROCEDURE — 84466 ASSAY OF TRANSFERRIN: CPT | Performed by: ORTHOPAEDIC SURGERY

## 2023-06-01 PROCEDURE — 83036 HEMOGLOBIN GLYCOSYLATED A1C: CPT | Performed by: ORTHOPAEDIC SURGERY

## 2023-06-01 PROCEDURE — 84443 ASSAY THYROID STIM HORMONE: CPT | Performed by: ORTHOPAEDIC SURGERY

## 2023-06-01 PROCEDURE — 82948 REAGENT STRIP/BLOOD GLUCOSE: CPT

## 2023-06-01 PROCEDURE — 97535 SELF CARE MNGMENT TRAINING: CPT

## 2023-06-01 PROCEDURE — 97530 THERAPEUTIC ACTIVITIES: CPT

## 2023-06-01 PROCEDURE — 83735 ASSAY OF MAGNESIUM: CPT | Performed by: ORTHOPAEDIC SURGERY

## 2023-06-01 PROCEDURE — 63710000001 INSULIN ASPART PER 5 UNITS: Performed by: ORTHOPAEDIC SURGERY

## 2023-06-01 RX ADMIN — CETIRIZINE HYDROCHLORIDE 10 MG: 10 TABLET, FILM COATED ORAL at 09:36

## 2023-06-01 RX ADMIN — Medication 1000 MG: at 20:10

## 2023-06-01 RX ADMIN — FLUTICASONE PROPIONATE 2 SPRAY: 50 SPRAY, METERED NASAL at 09:39

## 2023-06-01 RX ADMIN — Medication 400 MG: at 20:10

## 2023-06-01 RX ADMIN — SODIUM BICARBONATE 650 MG: 650 TABLET ORAL at 16:54

## 2023-06-01 RX ADMIN — DRONEDARONE 400 MG: 400 TABLET, FILM COATED ORAL at 18:27

## 2023-06-01 RX ADMIN — MAGNESIUM HYDROXIDE 10 ML: 2400 SUSPENSION ORAL at 13:13

## 2023-06-01 RX ADMIN — APIXABAN 5 MG: 5 TABLET, FILM COATED ORAL at 09:36

## 2023-06-01 RX ADMIN — SODIUM BICARBONATE 650 MG: 650 TABLET ORAL at 09:35

## 2023-06-01 RX ADMIN — DOCUSATE SODIUM 100 MG: 100 CAPSULE, LIQUID FILLED ORAL at 09:40

## 2023-06-01 RX ADMIN — TAMSULOSIN HYDROCHLORIDE 0.4 MG: 0.4 CAPSULE ORAL at 09:36

## 2023-06-01 RX ADMIN — Medication 5000 UNITS: at 20:10

## 2023-06-01 RX ADMIN — SODIUM BICARBONATE 650 MG: 650 TABLET ORAL at 20:10

## 2023-06-01 RX ADMIN — INSULIN ASPART 2 UNITS: 100 INJECTION, SOLUTION INTRAVENOUS; SUBCUTANEOUS at 20:12

## 2023-06-01 RX ADMIN — APIXABAN 5 MG: 5 TABLET, FILM COATED ORAL at 20:10

## 2023-06-01 RX ADMIN — METOPROLOL SUCCINATE 25 MG: 25 TABLET, FILM COATED, EXTENDED RELEASE ORAL at 20:10

## 2023-06-01 RX ADMIN — Medication 400 MG: at 09:36

## 2023-06-01 RX ADMIN — ACETAMINOPHEN 650 MG: 325 TABLET, FILM COATED ORAL at 10:45

## 2023-06-01 RX ADMIN — GLIPIZIDE 10 MG: 10 TABLET ORAL at 06:45

## 2023-06-01 RX ADMIN — INSULIN ASPART 2 UNITS: 100 INJECTION, SOLUTION INTRAVENOUS; SUBCUTANEOUS at 11:26

## 2023-06-01 RX ADMIN — DOCUSATE SODIUM 100 MG: 100 CAPSULE, LIQUID FILLED ORAL at 20:10

## 2023-06-01 RX ADMIN — METOPROLOL SUCCINATE 25 MG: 25 TABLET, FILM COATED, EXTENDED RELEASE ORAL at 09:35

## 2023-06-01 RX ADMIN — INSULIN ASPART 2 UNITS: 100 INJECTION, SOLUTION INTRAVENOUS; SUBCUTANEOUS at 06:45

## 2023-06-01 RX ADMIN — TAMSULOSIN HYDROCHLORIDE 0.4 MG: 0.4 CAPSULE ORAL at 20:36

## 2023-06-01 RX ADMIN — OXYCODONE HYDROCHLORIDE AND ACETAMINOPHEN 1 TABLET: 7.5; 325 TABLET ORAL at 20:13

## 2023-06-01 NOTE — THERAPY TREATMENT NOTE
Patient Name: Emre Lisa  : 1960    MRN: [id number]                              Today's Date: 2023       Admit Date: 2023    Visit Dx:     ICD-10-CM ICD-9-CM   1. Symbolic dysfunction  R48.9 784.60   2. Impaired functional mobility, balance, gait, and endurance  Z74.09 V49.89   3. Impaired mobility and ADLs  Z74.09 V49.89    Z78.9      Patient Active Problem List   Diagnosis   • Foot osteomyelitis, right   • Nodule of groin   • Type 2 diabetes mellitus with skin complication   • Status post transmetatarsal amputation of right foot   • Hammer toe of left foot   • Hallux malleus of left foot   • Cellulitis of left foot   • Infected hardware in left leg   • Chronic multifocal osteomyelitis of left foot   • Charcot's joint of left foot   • Skin ulcer of left foot, limited to breakdown of skin   • Dyspnea   • Syncope   • Acute respiratory failure with hypoxia   • Tachycardia   • Neuropathy due to secondary diabetes mellitus   • Atrial fibrillation   • Presence of biventricular cardiac pacemaker   • Hypertension   • Medically complex patient     Past Medical History:   Diagnosis Date   • Arthritis    • Atrial fibrillation    • Diabetes mellitus    • Diabetic foot ulcer associated with type 2 diabetes mellitus     left great toe   • Hallux malleus of left foot    • Hammer toe    • Hypertension    • MI (myocardial infarction)    • Neuropathy in diabetes    • Onychomycosis    • Presence of biventricular cardiac pacemaker    • Rheumatoid arthritis      Past Surgical History:   Procedure Laterality Date   • AMPUTATION DIGIT Right 2017    Procedure: RIGHT AMPUTATION TRANSMETATRASAL , RECESSION GASTROCNEMOUS;  Surgeon: Marco A Thompson DPM;  Location: [facility] OR;  Service:    • ANKLE FUSION Left 2023    Procedure: REDUCTION OF LEFT ANKLE DEFORMITY WITH APPLICATION OF EXTERNAL FIXATOR;  Surgeon: Marco A Thompson DPM;  Location: [facility] OR;  Service: Podiatry;  Laterality: Left;   • CARDIAC  CATHETERIZATION     • CARDIAC DEFIBRILLATOR PLACEMENT  2010, 2016   • CARPAL TUNNEL RELEASE  2015    elbow and wrist left arm   • FOOT FUSION Left 05/15/2023    Procedure: REMOVAL OF EXTERNAL FIXATOR LEFT LOWER EXTREMITY WITH TIBIAL TALOCALCANEAL ARTHRODESIS  AND ALL INDICATED PROCEDURES;  Surgeon: Marco A Thompson DPM;  Location: [facility] OR;  Service: Podiatry;  Laterality: Left;   • FOOT IRRIGATION, DEBRIDEMENT AND REPAIR Left 03/07/2018    Procedure: FOOT IRRIGATION, DEBRIDEMENT AND REPAIR;  Surgeon: Marco A Thompson DPM;  Location: [facility] OR;  Service:    • FOOT IRRIGATION, DEBRIDEMENT AND REPAIR Left 03/10/2018    Procedure: FOOT IRRIGATION, DEBRIDEMENT AND REPAIR;  Surgeon: Marco A Thompson DPM;  Location: [facility] OR;  Service: Podiatry   • FOOT WOUND CLOSURE Right 03/02/2017    Procedure: INCISION AND DRAINAGE, RIGHT FOOT;  Surgeon: Tung Rosenthal DPM;  Location: [facility] OR;  Service:    • HAMMER TOE REPAIR Left 02/15/2018    Procedure: HAMMERTOE CORRECTION LEFT SECOND, THIRD AND FOURTH TOES AND HALLUX INTERPHALANGEAL JOINT ARTHRODESIS LEFT FOOT (Micro Asnis, 4.0 & 5.0 Asnis)      (c-arm);  Surgeon: Marco A Thompson DPM;  Location: [facility] OR;  Service:    • HARDWARE REMOVAL Left 03/05/2018    Procedure: ANKLE/FOOT HARDWARE REMOVAL;  Surgeon: Marco A Thompson DPM;  Location: [facility] OR;  Service:    • INCISION AND DRAINAGE LEG Left 03/05/2018    Procedure: INCISION AND DRAINAGE LOWER EXTREMITY;  Surgeon: Marco A Thompson DPM;  Location: [facility] OR;  Service:    • PLANTAR FASCIA RELEASE Left 11/21/2019    Procedure: PLANTAR PLANING LEFT FOOT AND ALL OTHER INDICATED PROCEDURES;  Surgeon: Marco A Thompson DPM;  Location: [facility] OR;  Service: Podiatry   • TOE AMPUTATION Right 2016   • TONSILECTOMY, ADENOIDECTOMY, BILATERAL MYRINGOTOMY AND TUBES      age 25      General Information     Row Name 06/01/23 0940          OT Time and Intention    Mode of Treatment individual therapy;occupational therapy  -KD     Row Name  06/01/23 0940          General Information    Patient Profile Reviewed yes  -KD     Existing Precautions/Restrictions fall  -KD     Row Name 06/01/23 0940          Safety Issues, Functional Mobility    Impairments Affecting Function (Mobility) balance;endurance/activity tolerance;coordination;pain;postural/trunk control;range of motion (ROM);strength  -KD           User Key  (r) = Recorded By, (t) = Taken By, (c) = Cosigned By    Initials Name Provider Type    Kim Horowitz COTA Occupational Therapist Assistant                 Mobility/ADL's    No documentation.                Obj/Interventions    No documentation.                Goals/Plan    No documentation.                Clinical Impression     Row Name 06/01/23 0940          Pain Assessment    Pretreatment Pain Rating 0/10 - no pain  -KD     Posttreatment Pain Rating 0/10 - no pain  -KD     Row Name 06/01/23 0940          Plan of Care Review    Plan of Care Reviewed With patient  -KD     Progress improving  -KD           User Key  (r) = Recorded By, (t) = Taken By, (c) = Cosigned By    Initials Name Provider Type    Kim Horowitz COTA Occupational Therapist Assistant               Outcome Measures     Row Name 06/01/23 0940          How much help from another is currently needed...    Putting on and taking off regular lower body clothing? 2  -KD     Bathing (including washing, rinsing, and drying) 2  -KD     Toileting (which includes using toilet bed pan or urinal) 2  -KD     Putting on and taking off regular upper body clothing 3  -KD     Taking care of personal grooming (such as brushing teeth) 4  -KD     Eating meals 4  -KD     AM-PAC 6 Clicks Score (OT) 17  -KD     Row Name 06/01/23 0758          How much help from another person do you currently need...    Turning from your back to your side while in flat bed without using bedrails? 3  -LR     Moving from lying on back to sitting on the side of a flat bed without bedrails? 3  -LR     Moving to  and from a bed to a chair (including a wheelchair)? 3  -LR     Standing up from a chair using your arms (e.g., wheelchair, bedside chair)? 3  -LR     Climbing 3-5 steps with a railing? 3  -LR     To walk in hospital room? 3  -LR     AM-PAC 6 Clicks Score (PT) 18  -LR     Highest level of mobility 6 --> Walked 10 steps or more  -LR           User Key  (r) = Recorded By, (t) = Taken By, (c) = Cosigned By    Initials Name Provider Type    Kim Horowitz COTA Occupational Therapist Assistant    LR Phong Weller Physical Therapist                  OT Recommendation and Plan     Plan of Care Review  Plan of Care Reviewed With: patient  Progress: improving     Time Calculation:    Time Calculation- OT     Row Name 06/01/23 0940             Time Calculation- OT    OT Start Time 0940  -KD      OT Stop Time 1040  -KD      OT Time Calculation (min) 60 min  -KD      Total Timed Code Minutes- OT 60 minute(s)  -KD      OT Received On 06/01/23  -KD         Timed Charges    98241 - OT Self Care/Mgmt Minutes 60  -KD         Total Minutes    Timed Charges Total Minutes 60  -KD       Total Minutes 60  -KD            User Key  (r) = Recorded By, (t) = Taken By, (c) = Cosigned By    Initials Name Provider Type    Kim Horowitz COTA Occupational Therapist Assistant              Therapy Charges for Today     Code Description Service Date Service Provider Modifiers Qty    13306484125  OT SELF CARE/MGMT/TRAIN EA 15 MIN 6/1/2023 Kim Fernandez COTA GO 4               ZIGGY Morse  6/1/2023

## 2023-06-01 NOTE — PLAN OF CARE
Goal Outcome Evaluation:                      PT am session completed. PT session focused on improving gait quality and consistency. Patient CGA for sit<>stand transfer x15. CGA for ambulation 32'x 18 with RW and w/c follow. Patient still presents with some R knee buckling and heavy reliance on BUE. CGA for sit<>stand with RW x20. ModAx1 during bed>chair transfer once. Independent for w/c propulsion 150'x2.     PT pm session. PT facilitated w/c propulsion for efficency. CGA for ambulation on uneven ground 20'x4 with RW. Patient with less knee buckling this session. Educated on safety and hand placement and even what 5 inch fixed RW could do compared to a standard walker. All needs met at end of session.

## 2023-06-01 NOTE — PLAN OF CARE
Pt compliant with therapy, rests between sessions. Pt is WBAT and  ambulates short distances with walking boot,  walker, gait belt and assist x 1. Uses wheelchair for most of mobility. Pt has voiced no problems/complaints throughout shift. Vitals remain stable.

## 2023-06-01 NOTE — PLAN OF CARE
Goal Outcome Evaluation:  Plan of Care Reviewed With: patient        Progress: improving          Pt tolerated tx well this date. Pt up in w/c upon entry. Pt self propelled w/c to gym ~110' SBA. Pt then performed 5 sets x 15 reps w/ 40lbs on [employer]. Pt then amb ~90' CGA of 1 w/ RW. Pt participated in BUE ther ex in all planes w/ good tolerance. Pt then self propelled w/c ~ 110' SBA back to room. Pt w/ all needs in reach upon exit. Cont OT POC.

## 2023-06-01 NOTE — PATIENT CARE CONFERENCE
Attendance of weekly team conference:   Dr. Javi Alfaro, Lauren Choi, SLP, ZIGGY Mcneill/AMADO, Melissa Manzanares, MILO Coordinator, Adelita Castellon OT, Savana Garrison, RN, Director of [facility], Phong Weller, PT, and Santana Valdes, VALENTINW    Dr. Alfaro initiated and led today's multidisciplinary team meeting. Patient's progress reviewed, GG codes reviewed, discharge date discussed and equipment needs addressed.       Discharge Disposition: to be determined    Expected Discharge Date: to be determined    Anticipated discharge orders/equipment: has all recommended equipment    Barriers: decreased strength       Self-Care Admission Goal Date  6/1/23 Discharge   Eating 5 6  5     Oral hygiene 5 6  5     Toileting hygiene 1 6  1     Shower/bathe self 2 6  2     Upper body dressing 4 6  4     Lower body dressing 2 6  2     Putting on/taking off footwear 2 6  2        Mobility  Admission Goal Date  6/1/23 Discharge   A.Roll left and right 4 6  4     B.Sit to lying 4 6  4     C. Lying to sitting on side of bed 4 6  4     D.Sit to stand 2 6  3     E. Chair/bed-to-chair transfer    2 6  2     F.Toilet transfer    2 6  2     G. Car Transfer 88 88  88     I.Walk 10 feet 1 6  1     J.Walk 50 feet with two turns. 1 6  1     K.Walk 150 feet:  88 4  88     L.Walking 10 feet on uneven surfaces  88 4  88     M.1 step (curb) 3 4  3     N.4 steps 3 4  3     O.12 steps 88 88  88     P.Picking up object 88 6  88     R. Wheel 50 feet with two turns 6 6  6     S.Wheel 150 feet 6 6  6         Hearing, Speech and Vision Admission Goal  Date  6/1/23 Discharge   Expression of Ideas and Wants (consider both verbal and non-verbal expression and excluding language barriers)  4- Without Difficulty  3- Some difficulty  2- Frequently exhibits difficulty  1- Rarely/never exhibits clear speech  4 4 4    Understanding Verbal and Non-Verbal Content (with hearing aid or device, if used, and excluding language barriers)  4- Comprehends without cues  3-  Usually understands  2- Some Understands  1- Rarely/Never Understands 4 4 4        Patient to benefit from skilled PT to address exercise endurance, safety in self-care, and functional mobility.      Patient to benefit from skilled Occupational Therapy to address activities of daily living, functional mobility, fine motor, gross motor, endurance, safety in self-care, and activity tolerance.        Patient to benefit from skilled Speech Therapy to address dysphagia, communication deficits and cognitive-linguistic communication disorder.

## 2023-06-01 NOTE — THERAPY TREATMENT NOTE
Inpatient Rehabilitation - Physical Therapy Treatment Note       [facility]     Patient Name: Emre Lisa  : 1960  MRN: [id number]    Today's Date: 2023                    Admit Date: 2023      Visit Dx:     ICD-10-CM ICD-9-CM   1. Symbolic dysfunction  R48.9 784.60   2. Impaired functional mobility, balance, gait, and endurance  Z74.09 V49.89   3. Impaired mobility and ADLs  Z74.09 V49.89    Z78.9        Patient Active Problem List   Diagnosis   • Foot osteomyelitis, right   • Nodule of groin   • Type 2 diabetes mellitus with skin complication   • Status post transmetatarsal amputation of right foot   • Hammer toe of left foot   • Hallux malleus of left foot   • Cellulitis of left foot   • Infected hardware in left leg   • Chronic multifocal osteomyelitis of left foot   • Charcot's joint of left foot   • Skin ulcer of left foot, limited to breakdown of skin   • Dyspnea   • Syncope   • Acute respiratory failure with hypoxia   • Tachycardia   • Neuropathy due to secondary diabetes mellitus   • Atrial fibrillation   • Presence of biventricular cardiac pacemaker   • Hypertension   • Medically complex patient       Past Medical History:   Diagnosis Date   • Arthritis    • Atrial fibrillation    • Diabetes mellitus    • Diabetic foot ulcer associated with type 2 diabetes mellitus     left great toe   • Hallux malleus of left foot    • Hammer toe    • Hypertension    • MI (myocardial infarction)    • Neuropathy in diabetes    • Onychomycosis    • Presence of biventricular cardiac pacemaker    • Rheumatoid arthritis        Past Surgical History:   Procedure Laterality Date   • AMPUTATION DIGIT Right 2017    Procedure: RIGHT AMPUTATION TRANSMETATRASAL , RECESSION GASTROCNEMOUS;  Surgeon: Marco A Thompson DPM;  Location: [facility];  Service:    • ANKLE FUSION Left 2023    Procedure: REDUCTION OF LEFT ANKLE DEFORMITY WITH APPLICATION OF EXTERNAL FIXATOR;  Surgeon: Marco A Thompson DPM;   Location: [facility] OR;  Service: Podiatry;  Laterality: Left;   • CARDIAC CATHETERIZATION     • CARDIAC DEFIBRILLATOR PLACEMENT  2010, 2016   • CARPAL TUNNEL RELEASE  2015    elbow and wrist left arm   • FOOT FUSION Left 05/15/2023    Procedure: REMOVAL OF EXTERNAL FIXATOR LEFT LOWER EXTREMITY WITH TIBIAL TALOCALCANEAL ARTHRODESIS  AND ALL INDICATED PROCEDURES;  Surgeon: Marco A Thompson DPM;  Location: [facility] OR;  Service: Podiatry;  Laterality: Left;   • FOOT IRRIGATION, DEBRIDEMENT AND REPAIR Left 03/07/2018    Procedure: FOOT IRRIGATION, DEBRIDEMENT AND REPAIR;  Surgeon: Marco A Thompson DPM;  Location: [facility] OR;  Service:    • FOOT IRRIGATION, DEBRIDEMENT AND REPAIR Left 03/10/2018    Procedure: FOOT IRRIGATION, DEBRIDEMENT AND REPAIR;  Surgeon: Marco A Thompson DPM;  Location: [facility];  Service: Podiatry   • FOOT WOUND CLOSURE Right 03/02/2017    Procedure: INCISION AND DRAINAGE, RIGHT FOOT;  Surgeon: Tung Rosenthal DPM;  Location: [facility] OR;  Service:    • HAMMER TOE REPAIR Left 02/15/2018    Procedure: HAMMERTOE CORRECTION LEFT SECOND, THIRD AND FOURTH TOES AND HALLUX INTERPHALANGEAL JOINT ARTHRODESIS LEFT FOOT (Micro Asnis, 4.0 & 5.0 Asnis)      (c-arm);  Surgeon: Marco A Thompson DPM;  Location: [facility] OR;  Service:    • HARDWARE REMOVAL Left 03/05/2018    Procedure: ANKLE/FOOT HARDWARE REMOVAL;  Surgeon: Marco A Thompson DPM;  Location: [facility];  Service:    • INCISION AND DRAINAGE LEG Left 03/05/2018    Procedure: INCISION AND DRAINAGE LOWER EXTREMITY;  Surgeon: Marco A Thompson DPM;  Location: [facility] OR;  Service:    • PLANTAR FASCIA RELEASE Left 11/21/2019    Procedure: PLANTAR PLANING LEFT FOOT AND ALL OTHER INDICATED PROCEDURES;  Surgeon: Marco A Thompson DPM;  Location: [facility] OR;  Service: Podiatry   • TOE AMPUTATION Right 2016   • TONSILECTOMY, ADENOIDECTOMY, BILATERAL MYRINGOTOMY AND TUBES      age 25       PT ASSESSMENT (last 12 hours)     IRF PT Evaluation and Treatment     Row Name 06/01/23  1430 06/01/23 0758       PT Time and Intention    Document Type daily treatment  -LR daily treatment  -LR    Mode of Treatment individual therapy;physical therapy  -LR individual therapy;physical therapy  -LR    Row Name 06/01/23 1430 06/01/23 0758       General Information    Patient Profile Reviewed yes  -LR yes  -LR    Existing Precautions/Restrictions fall  -LR fall  -LR    Row Name 06/01/23 1430 06/01/23 0758       Pain Assessment    Pretreatment Pain Rating 0/10 - no pain  -LR 0/10 - no pain  -LR    Posttreatment Pain Rating 0/10 - no pain  -LR 0/10 - no pain  -LR    Row Name 06/01/23 1430 06/01/23 0758       Cognition/Psychosocial    Affect/Mental Status (Cognition) WFL  -LR WFL  -LR    Orientation Status (Cognition) oriented x 4  -LR oriented x 4  -LR    Follows Commands (Cognition) WFL  -LR WFL  -LR    Personal Safety Interventions safety round/check completed;elopement precautions initiated;fall prevention program maintained;gait belt;nonskid shoes/slippers when out of bed  -LR safety round/check completed;elopement precautions initiated;nonskid shoes/slippers when out of bed;gait belt;fall prevention program maintained  -LR    Row Name 06/01/23 0758          Bed-Chair Transfer    Bed-Chair Sanders (Transfers) minimum assist (75% patient effort)  -LR     Assistive Device (Bed-Chair Transfers) walker, front-wheeled  -LR     Row Name 06/01/23 1430 06/01/23 0758       Sit-Stand Transfer    Sit-Stand Sanders (Transfers) contact guard  -LR contact guard  -LR    Assistive Device (Sit-Stand Transfers) walker, front-wheeled  -LR walker, front-wheeled  -LR    Row Name 06/01/23 1430 06/01/23 0758       Gait/Stairs (Locomotion)    Gait/Stairs Locomotion gait/ambulation independence;gait/ambulation assistive device  -LR gait/ambulation independence;gait/ambulation assistive device  -LR    Sanders Level (Gait) contact guard  -LR contact guard  -LR    Assistive Device (Gait) walker, front-wheeled  -LR --     Distance in Feet (Gait) 20'x2  uneven surface  -LR 32'x18  -LR    Deviations/Abnormal Patterns (Gait) bilateral deviations;marilee decreased;festinating/shuffling;gait speed decreased;weight shifting decreased  -LR bilateral deviations;marilee decreased;festinating/shuffling;gait speed decreased;weight shifting decreased  -LR    Bilateral Gait Deviations knee buckling bilaterally;forward flexed posture  -LR knee buckling bilaterally;forward flexed posture  -LR    Row Name 06/01/23 0758          Wheelchair Mobility/Management    Mobility Activities (Wheelchair) mobility (all) activities  -LR     All Mobility, Oliver (Wheelchair) independent  -LR     Distance Propelled in Feet (Wheelchair) 150'x2  -LR     Row Name 06/01/23 1430          Positioning and Restraints    Pre-Treatment Position sitting in chair/recliner  -LR     In Wheelchair notified nsg;encouraged to call for assist;call light within reach  -LR     Row Name 06/01/23 1430 06/01/23 0758       Vital Signs    Pre Systolic BP Rehab 123  -  -LR    Pre Treatment Diastolic BP 75  -LR 7  -LR    Pretreatment Heart Rate (beats/min) 94  -LR 75  -LR    Pre SpO2 (%) 95  -LR 96  -LR    O2 Delivery Pre Treatment room air  -LR room air  -LR    Pre Patient Position Sitting  -LR Sitting  -LR          User Key  (r) = Recorded By, (t) = Taken By, (c) = Cosigned By    Initials Name Provider Type    LR Phong Weller Physical Therapist              Wound 05/15/23 1546 Left anterior foot Incision (Active)   Closure KO 06/01/23 0735   Drainage Amount none 06/01/23 0735     Physical Therapy Education     Title: PT OT SLP Therapies (In Progress)     Topic: Physical Therapy (Not Started)     Point: Mobility training (Not Started)     Learner Progress:  Not documented in this visit.          Point: Home exercise program (Not Started)     Learner Progress:  Not documented in this visit.          Point: Body mechanics (Not Started)     Learner Progress:  Not documented  in this visit.                            PT Recommendation and Plan    Frequency of Treatment (PT):  (5-6d/week for 90 minutes)     Daily Progress Summary (PT)  Functional Goal Overall Progress (PT): progressing toward functional goals as expected       Outcome Measures     Row Name 06/01/23 0758 05/31/23 1251 05/31/23 0801       10 Meter Walk Test Self-Selected Velocity    Self-Selected Velocity: Trial 1 -- -- 40 sec.  -LR    Self-Selected Velocity: Trial 2 -- -- 40 sec.  -LR    Self-Selected Velocity: Trial 3 -- -- 40 sec.  -LR    Self-Selected Velocity: Average Time -- -- 40 sec.  -LR       10 Meter Walk Test Actual Velocity    Actual Self-Selected Velocity -- -- 0.15 m/s  -LR       9 Hole Peg    9-Hole Peg Left -- 45.88  -RW --    9-Hole Peg Right -- 51.2  -RW --       How much help from another person do you currently need...    Turning from your back to your side while in flat bed without using bedrails? 3  -LR -- 2  -LR    Moving from lying on back to sitting on the side of a flat bed without bedrails? 3  -LR -- 2  -LR    Moving to and from a bed to a chair (including a wheelchair)? 3  -LR -- 2  -LR    Standing up from a chair using your arms (e.g., wheelchair, bedside chair)? 3  -LR -- 2  -LR    Climbing 3-5 steps with a railing? 3  -LR -- 3  -LR    To walk in hospital room? 3  -LR -- 1  -LR    AM-PAC 6 Clicks Score (PT) 18  -LR -- 12  -LR       How much help from another is currently needed...    Putting on and taking off regular lower body clothing? -- 2  -RW --    Bathing (including washing, rinsing, and drying) -- 2  -RW --    Toileting (which includes using toilet bed pan or urinal) -- 2  -RW --    Putting on and taking off regular upper body clothing -- 3  -RW --    Taking care of personal grooming (such as brushing teeth) -- 4  -RW --    Eating meals -- 4  -RW --    AM-PAC 6 Clicks Score (OT) -- 17  -RW --       Functional Assessment    Outcome Measure Options -- AM-PAC 6 Clicks Daily Activity (OT);9  Hole Peg  -RW 10 Meter Walk;AM-PAC 6 Clicks Basic Mobility (PT)  -LR          User Key  (r) = Recorded By, (t) = Taken By, (c) = Cosigned By    Initials Name Provider Type    LR Phong Weller Physical Therapist    RW Adelita Castellon, OT Occupational Therapist                     Time Calculation:      PT Charges     Row Name 06/01/23 1537 06/01/23 1536          Time Calculation    Start Time 1430  -LR 0758  -LR     Stop Time 1510  -LR 0913  -LR     Time Calculation (min) 40 min  -LR 75 min  -LR     PT Received On 06/01/23  -LR 06/01/23  -LR        Time Calculation- PT    Total Timed Code Minutes- PT 40 minute(s)  -LR 75 minute(s)  -LR        Timed Charges    04901 - PT Therapeutic Activity Minutes 40  -LR 75  -LR        Total Minutes    Timed Charges Total Minutes 40  -LR 75  -LR      Total Minutes 40  -LR 75  -LR           User Key  (r) = Recorded By, (t) = Taken By, (c) = Cosigned By    Initials Name Provider Type    LR Phong Weller Physical Therapist                Therapy Charges for Today     Code Description Service Date Service Provider Modifiers Qty    20241936943 HC PT EVAL MOD COMPLEXITY 4 5/31/2023 Phong Weller GP 1    35349914259 HC PT THERAPEUTIC ACT EA 15 MIN 5/31/2023 Phong Welelr GP 2    10140245924 HC PT THERAPEUTIC ACT EA 15 MIN 6/1/2023 Phong Weller GP 8            PT G-Codes  Outcome Measure Options: AM-PAC 6 Clicks Daily Activity (OT), 9 Hole Peg  AM-PAC 6 Clicks Score (PT): 18  AM-PAC 6 Clicks Score (OT): 17      Phong Weller  6/1/2023

## 2023-06-01 NOTE — PROGRESS NOTES
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    HPI: No changes since admission    Vital Signs  Temp:  [98.6 °F (37 °C)] 98.6 °F (37 °C)  Heart Rate:  [85-94] 94  Resp:  [18] 18  BP: (119-123)/(68-73) 123/73    Physical Exam 1:    Constitutional: Alert cooperative no complications undergone the evaluation of discussed this and she the patient care conference today     HENT:      Eyes:     Neck:      Cardiovascular: No chest pain    Pulmonary: Cough sputum production    Abdominal:      Genitourinary/Anorectal:       Musculoskeletal: No evidence of infection or DVT    Skin:     Neurological:      Psychiatric/Behavioral: Cooperative physical therapy       Results Review:    I reviewed the patient's new clinical results.    Nursing notes and therapy notes have been reviewed.    I have reviewed medications.    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 indication for admission neuropathy resulted in Charcot foot which required surgery  Allergy to heparin resulted in cart almost cardiac arrest and a pacemaker and other medical treatments he has been on LTAC and therefore was transferred to ARU for rehabilitation  Plan discharge 1 week home  Active Problems: All other conditions are stable under treatment type 2 diabetes mellitus with skin complication    Charcot's joint of left foot    Atrial fibrillation    Presence of biventricular cardiac pacemaker    Hypertension     Medically complex patient           Javi Alfaro MD  06/01/23  11:56 CDT          This document has been electronically signed by Javi Alfaro MD on June 1, 2023 11:56 CDT      Part of this note may be an electronic transcription/translation of spoken language to printed text using the Dragon Dictation System.

## 2023-06-01 NOTE — ACP (ADVANCE CARE PLANNING)
[facility]  DISCHARGE SUMMARY    PATIENT NAME: Emre Lisa      PHYSICIAN: Charlie Persaud MD  : 1960        MRN: [id number]  Patient Care Team:  Jamaal Bravo MD as PCP - Marco A Chauhan DPM as Consulting Physician (Podiatry)      Date of Discharge:  2023    Discharge Diagnosis:   Cellulitis of left foot  Charcot's joint of left foot  Type 2 DM  Arthritis  A-fib  Hypertension  Rheumatoid arthritis  DVT & GI prophylaxis  External fixator removal (05/15/2023)     Presenting Problem/History of Present Illness  Chief Complaint:  Continued antibiotic therapy and medical management  History of Present Illness: Patient is a 62 y/o male with a pmh of type 2 DM, hypertension, CAD, and neuropathy.  He recently underwent surgical repair of Charcot deformity with external fixator stabilization to his left foot related to cellulitis.  He tolerated the surgical procedure well.  He will need IV antibiotic therapy through 23 due to bone/joint infection.  He remains non-weight bearing of left lower extremity.  He tells me the pain to his left foot remains controlled, however, he does have a torn left rotator cuff and arthritis so his hip and shoulder are the source of his pain.  He tells me he is doing well and he has no other complaints or concerns.  I have reviewed the patients most recent labs and diagnostics independently.  Glucose 124, BUN 15, creatinine 0.89, sodium 138, potassium 4.2, chloride 105, co2 24, wbc 5.25, hgb 9.9, hct 30.1, platelets 156.     Hospital Course  Patient is a 63 y.o. male who arrived at our facility for prolonged antibiotic therapy.  Surgical repair of Charcot deformity with external fixator placement.  He was noted to develop bilateral PE's requiring non-invasive ventilation and was started on heparin drip.  Unfortunately, patient was noted to develop thrombocytopenia and Hem/Onc was consulted.   He was placed on Fondaparinux and patient was noted to clinically improve.  He was able to be weaned from oxygen and tolerated room air.  He worked with therapy as per podiatry recommendations.  On 05/15/2023, patient had external fixator removed via Dr. Thompson and returned to our facility for ongoing therapy.  He continued to improve and was accepted on ARU for continued services prior to returning home.  On day of discharge, patient is sitting up in bed resting comfortably.  He denies any pain and has no voiced/visual c/o.  He remains hemodynamically stable for discharge.    Procedures Performed:  External fixator removal via Dr. Thompson 05/15/2023      Consults:   Consults     Date and Time Order Name Status Description    4/28/2023  6:33 AM Hematology & Oncology Inpatient Consult Completed           Pertinent Test Results: See medical record for labs and diagnostics.    Condition on Discharge:  Stable    Vital Signs  Temp:  [98.6 °F (37 °C)] 98.6 °F (37 °C)  Heart Rate:  [85-94] 94  Resp:  [18] 18  BP: (119-123)/(68-73) 123/73    Physical Exam:   Physical Exam  Vitals reviewed.   Constitutional:       Appearance: Normal appearance. He is not ill-appearing.   HENT:      Head: Normocephalic and atraumatic.      Mouth/Throat:      Mouth: Mucous membranes are moist.      Pharynx: Oropharynx is clear.   Eyes:      Extraocular Movements: Extraocular movements intact.      Pupils: Pupils are equal, round, and reactive to light.   Neck:      Vascular: No carotid bruit.   Cardiovascular:      Rate and Rhythm: Normal rate and regular rhythm.      Pulses: Normal pulses.      Heart sounds: Normal heart sounds. No murmur heard.    No gallop.   Pulmonary:      Effort: Pulmonary effort is normal. No respiratory distress.      Breath sounds: Normal breath sounds. No rales.   Abdominal:      General: Bowel sounds are normal. There is no distension.      Palpations: Abdomen is soft.      Tenderness: There is no guarding.    Musculoskeletal:         General: Normal range of motion.      Cervical back: Normal range of motion and neck supple.      Right lower leg: Edema present.      Left lower leg: Edema present.   Skin:     General: Skin is warm and dry.      Capillary Refill: Capillary refill takes less than 2 seconds.      Findings: Bruising present.      Comments: S/P L surgical repair Charcot foot with boot in place     Neurological:      General: No focal deficit present.      Mental Status: He is alert and oriented to person, place, and time.   Psychiatric:         Mood and Affect: Mood normal.         Behavior: Behavior normal.         Thought Content: Thought content normal.         Judgment: Judgment normal.         LABS Reviewed Prior to Discharge:  Results from last 7 days   Lab Units 06/01/23  0510 05/29/23  0549 05/26/23  0531   SODIUM mmol/L 135* 139 136   POTASSIUM mmol/L 4.5 4.8 4.6   CHLORIDE mmol/L 99 103 100   CO2 mmol/L 23.0 26.0 24.0   BUN mg/dL 25* 27* 21   CREATININE mg/dL 1.03 1.19 0.94   GLUCOSE mg/dL 151* 168* 200*   CALCIUM mg/dL 9.0 9.1 9.2   BILIRUBIN mg/dL 0.7 0.6 0.6   ALK PHOS U/L 131* 114 100   ALT (SGPT) U/L 21 22 20   AST (SGOT) U/L 21 22 23       Results from last 7 days   Lab Units 06/01/23  0510 05/29/23  0549 05/26/23  0531   MAGNESIUM mg/dL 2.0 1.9 1.9       Results from last 7 days   Lab Units 06/01/23  0510 05/29/23  0549 05/26/23  0530   WBC 10*3/mm3 5.71 4.93 4.34   HEMOGLOBIN g/dL 11.5* 11.2* 10.8*   HEMATOCRIT % 36.6* 35.5* 34.4*   PLATELETS 10*3/mm3 227 195 233       Discharge Disposition:  ARU [facility]    Discharge Medications     Discharge Medications      Continue These Medications      Instructions Start Date   ONE TOUCH ULTRA 2 w/Device kit   No dose, route, or frequency recorded.         ASK your doctor about these medications      Instructions Start Date   ascorbic acid 1000 MG tablet  Commonly known as: VITAMIN C   1,000 mg, Oral, Nightly      Cholecalciferol 125 MCG (5000 UT)  tablet   5,000 Units, Oral, Nightly      clindamycin 300 MG capsule  Commonly known as: CLEOCIN   300 mg, Oral, 3 Times Daily      dronedarone 400 MG tablet  Commonly known as: MULTAQ   400 mg, Oral, Nightly      fexofenadine 180 MG tablet  Commonly known as: ALLEGRA   1 tablet, Oral, Nightly      fluticasone 50 MCG/ACT nasal spray  Commonly known as: FLONASE   2 sprays, Nasal, As Needed      furosemide 20 MG tablet  Commonly known as: Lasix   20 mg, Oral, Daily      glimepiride 4 MG tablet  Commonly known as: AMARYL   4 mg, Oral, Nightly      losartan 25 MG tablet  Commonly known as: COZAAR   25 mg, Oral, Daily      magnesium oxide 400 MG tablet  Commonly known as: MAG-OX   400 mg, Oral, 2 Times Daily      metFORMIN 1000 MG tablet  Commonly known as: GLUCOPHAGE   1,000 mg, Oral, 2 Times Daily With Meals      metoprolol succinate XL 25 MG 24 hr tablet  Commonly known as: TOPROL-XL   25 mg, Oral, 2 Times Daily, 0.5 tablet       multivitamin tablet tablet   Oral, Nightly      ONE TOUCH ULTRA TEST test strip  Generic drug: glucose blood   USE TO TEST BLOOD SUGAR THREE TIMES A DAY      Probiotic 1-250 BILLION-MG capsule   1 capsule, Oral, 2 Times Daily      vancomycin  Ask about: Should I take this medication?   1,750 mg, Intravenous, Every 12 Hours      Zinc 50 MG tablet   1 tablet, Oral, Nightly             Discharge Diet:  Cardiac    Activity at Discharge: Stable    Follow-up Appointments:  See discharge reconciliation form for follow up appointments    Test Results Pending at Discharge:  None         Time: Discharge 38 min  History, physical exam, assessment and plan may have been partly or fully copied from before, but  changes made to the copied record to reflect care on the date of service. Part of the lab and imaging  reports auto populated and corrected. Some of this note may be an electronic transcription of spoken  language to printed text. This may permit erroneous, or at times, nonsensical words or phrases  to be  inadvertently transcribed. Although I have reviewed the note for such errors, some may still exist.      This document has been electronically signed by Charlie Persaud MD on June 1, 2023 10:09 CDT

## 2023-06-01 NOTE — PLAN OF CARE
Problem: Rehabilitation ([facility]) Plan of Care  Goal: Plan of Care Review  Outcome: Ongoing, Progressing  Flowsheets (Taken 6/1/2023 9985)  Progress: improving  Plan of Care Reviewed With: patient  Outcome Evaluation: vss. pain controlled with prn po meds. resting some this shift. RLE elevated with wedge pillow.   Goal Outcome Evaluation:  Plan of Care Reviewed With: patient        Progress: improving  Outcome Evaluation: vss. pain controlled with prn po meds. resting some this shift. RLE elevated with wedge pillow.

## 2023-06-01 NOTE — THERAPY TREATMENT NOTE
Inpatient Rehabilitation - Occupational Therapy Treatment Note    [facility]     Patient Name: Emre Lisa  : 1960  MRN: [id number]    Today's Date: 2023                 Admit Date: 2023         ICD-10-CM ICD-9-CM   1. Symbolic dysfunction  R48.9 784.60   2. Impaired functional mobility, balance, gait, and endurance  Z74.09 V49.89   3. Impaired mobility and ADLs  Z74.09 V49.89    Z78.9        Patient Active Problem List   Diagnosis   • Foot osteomyelitis, right   • Nodule of groin   • Type 2 diabetes mellitus with skin complication   • Status post transmetatarsal amputation of right foot   • Hammer toe of left foot   • Hallux malleus of left foot   • Cellulitis of left foot   • Infected hardware in left leg   • Chronic multifocal osteomyelitis of left foot   • Charcot's joint of left foot   • Skin ulcer of left foot, limited to breakdown of skin   • Dyspnea   • Syncope   • Acute respiratory failure with hypoxia   • Tachycardia   • Neuropathy due to secondary diabetes mellitus   • Atrial fibrillation   • Presence of biventricular cardiac pacemaker   • Hypertension   • Medically complex patient       Past Medical History:   Diagnosis Date   • Arthritis    • Atrial fibrillation    • Diabetes mellitus    • Diabetic foot ulcer associated with type 2 diabetes mellitus     left great toe   • Hallux malleus of left foot    • Hammer toe    • Hypertension    • MI (myocardial infarction)    • Neuropathy in diabetes    • Onychomycosis    • Presence of biventricular cardiac pacemaker    • Rheumatoid arthritis        Past Surgical History:   Procedure Laterality Date   • AMPUTATION DIGIT Right 2017    Procedure: RIGHT AMPUTATION TRANSMETATRASAL , RECESSION GASTROCNEMOUS;  Surgeon: Marco A Thompson DPM;  Location: [facility] OR;  Service:    • ANKLE FUSION Left 2023    Procedure: REDUCTION OF LEFT ANKLE DEFORMITY WITH APPLICATION OF EXTERNAL FIXATOR;  Surgeon: Marco A Thompson DPM;  Location:  [facility] OR;  Service: Podiatry;  Laterality: Left;   • CARDIAC CATHETERIZATION     • CARDIAC DEFIBRILLATOR PLACEMENT  2010, 2016   • CARPAL TUNNEL RELEASE  2015    elbow and wrist left arm   • FOOT FUSION Left 05/15/2023    Procedure: REMOVAL OF EXTERNAL FIXATOR LEFT LOWER EXTREMITY WITH TIBIAL TALOCALCANEAL ARTHRODESIS  AND ALL INDICATED PROCEDURES;  Surgeon: Marco A Thompson DPM;  Location: [facility] OR;  Service: Podiatry;  Laterality: Left;   • FOOT IRRIGATION, DEBRIDEMENT AND REPAIR Left 03/07/2018    Procedure: FOOT IRRIGATION, DEBRIDEMENT AND REPAIR;  Surgeon: Marco A Thompson DPM;  Location: [facility] OR;  Service:    • FOOT IRRIGATION, DEBRIDEMENT AND REPAIR Left 03/10/2018    Procedure: FOOT IRRIGATION, DEBRIDEMENT AND REPAIR;  Surgeon: Marco A Thompson DPM;  Location: [facility] OR;  Service: Podiatry   • FOOT WOUND CLOSURE Right 03/02/2017    Procedure: INCISION AND DRAINAGE, RIGHT FOOT;  Surgeon: Tung Rosenthal DPM;  Location: [facility] OR;  Service:    • HAMMER TOE REPAIR Left 02/15/2018    Procedure: HAMMERTOE CORRECTION LEFT SECOND, THIRD AND FOURTH TOES AND HALLUX INTERPHALANGEAL JOINT ARTHRODESIS LEFT FOOT (Micro Asnis, 4.0 & 5.0 Asnis)      (c-arm);  Surgeon: Marco A Thompson DPM;  Location: [facility] OR;  Service:    • HARDWARE REMOVAL Left 03/05/2018    Procedure: ANKLE/FOOT HARDWARE REMOVAL;  Surgeon: Marco A Thompson DPM;  Location: [facility] OR;  Service:    • INCISION AND DRAINAGE LEG Left 03/05/2018    Procedure: INCISION AND DRAINAGE LOWER EXTREMITY;  Surgeon: Marco A Thompson DPM;  Location: [facility] OR;  Service:    • PLANTAR FASCIA RELEASE Left 11/21/2019    Procedure: PLANTAR PLANING LEFT FOOT AND ALL OTHER INDICATED PROCEDURES;  Surgeon: Marco A Thompson DPM;  Location: [facility] OR;  Service: Podiatry   • TOE AMPUTATION Right 2016   • TONSILECTOMY, ADENOIDECTOMY, BILATERAL MYRINGOTOMY AND TUBES      age 25             IRF OT ASSESSMENT FLOWSHEET (last 12 hours)     IRF OT Evaluation and Treatment     Row Name  06/01/23 1320 06/01/23 0940       OT Time and Intention    Document Type daily treatment  -KD daily treatment  -KD    Mode of Treatment individual therapy;occupational therapy  -KD --    Row Name 06/01/23 1320          General Information    Patient Profile Reviewed yes  -KD     Existing Precautions/Restrictions fall  -KD     Row Name 06/01/23 1320          Home Use of Assistive/Adaptive Equipment    Equipment Currently Used at Home other (see comments);cane, quad;walker, standard;glucometer;power chair,(recliner lift);cane, quad tip  knee scooter, walk in shower and no room for a chair; regular toilet  -KD     Row Name 06/01/23 1320 06/01/23 0940       Pain Assessment    Pretreatment Pain Rating 2/10  -KD --    Posttreatment Pain Rating 2/10  -KD --    Pain Location - -- --  generalized  -KD    Row Name 06/01/23 1320 06/01/23 0940       Cognition/Psychosocial    Affect/Mental Status (Cognition) -- WFL  -KD    Orientation Status (Cognition) oriented x 4  -KD oriented x 4  -KD    Row Name 06/01/23 1320          Range of Motion Comprehensive    General Range of Motion bilateral upper extremity ROM WFL  -KD     Row Name 06/01/23 1320          Strength Comprehensive (MMT)    General Manual Muscle Testing (MMT) Assessment upper extremity strength deficits identified  -KD     Row Name 06/01/23 1320          Mobility    Extremity Weight-bearing Status left lower extremity  -KD     Left Lower Extremity (Weight-bearing Status) weight-bearing as tolerated (WBAT)  -KD     Row Name 06/01/23 1320 06/01/23 0940       Functional Mobility    Functional Mobility- Ind. Level contact guard assist  -KD supervision required  -KD    Functional Mobility- Device walker, 4-wheeled  -KD walker, 4-wheeled  -KD    Functional Mobility-Distance (Feet) 90  -KD 75  -KD    Row Name 06/01/23 1320          Sit-Stand Transfer    Sit-Stand [place] (Transfers) contact guard  -KD     Assistive Device (Sit-Stand Transfers) walker, front-wheeled  grab  bar in bathroom  -KD     Row Name 06/01/23 1320 06/01/23 0940       Wheelchair Mobility/Management    Mobility Activities (Wheelchair) mobility (all) activities;forward propulsion  -KD forward propulsion  -KD    All Mobility, [place] (Wheelchair) standby assist  -KD --    Distance Propelled in Feet (Wheelchair) 110  -  x2  -KD    Row Name 06/01/23 1320          Safety Issues, Functional Mobility    Impairments Affecting Function (Mobility) balance;endurance/activity tolerance;coordination;pain;postural/trunk control;range of motion (ROM);strength  -KD     Row Name 06/01/23 1320 06/01/23 0940       Motor Skills    Therapeutic Exercise --  Pt completed 10 sets x 15 reps on cable column to incease UB strength. Pt tolerated well.  -KD --  Pt completed 5 sets x 15 reps w/ 40lbs of wgt.  -KD    Row Name 06/01/23 1320 06/01/23 0940       Positioning and Restraints    Pre-Treatment Position sitting in chair/recliner  -KD sitting in chair/recliner  -KD    In Wheelchair --  In gym  -KD sitting  -KD    Row Name 06/01/23 1320 06/01/23 0940       Vital Signs    Pre Systolic BP Rehab 123  -  -KD    Pre Treatment Diastolic BP 73  -KD 73  -KD    Pretreatment Heart Rate (beats/min) 94  -KD 94  -KD    Pre SpO2 (%) 95  -KD 95  -KD    O2 Delivery Pre Treatment room air  -KD --    Pre Patient Position -- Sitting  -KD    Intra Patient Position -- Standing  -KD    Post Patient Position -- Sitting  -KD    Row Name 06/01/23 1320          Therapy Assessment/Plan (OT)    Patient's Goals For Discharge return home  -KD     Row Name 06/01/23 1320          Therapy Assessment/Plan (OT)    Rehab Potential/Prognosis (OT) good, to achieve stated therapy goals  -KD     Frequency of Treatment (OT) 90 minutes per session;other (see comments)  5-6 d/wk  -KD     Estimated Duration of Therapy (OT) until facility discharge  -KD     Problem List (OT) problems related to;balance;coordination;mobility;strength;motor control;range of motion  (ROM)  -KD     Activity Limitations Related to Problem List (OT) BADLs not performed adequately or safely;IADLs not performed adequately or safely;unable to transfer safely;unable to ambulate safely  -KD     Row Name 06/01/23 1320          IRF OT Goals    Transfer Goal Selection (OT-IRF) transfers, OT goal 1  -KD     Bathing Goal Selection (OT-IRF) bathing, OT goal 1  -KD     LB Dressing Goal Selection (OT-IRF) LB dressing, OT goal 1  -KD     Toileting Goal Selection (OT-IRF) toileting, OT goal 1  -KD     Activity Tolerance Goal Selection (OT) activity tolerance, OT goal 1  -KD     Row Name 06/01/23 1320          Transfer Goal 1 (OT-IRF)    Activity/Assistive Device (Transfer Goal 1, OT-IRF) all transfers  -KD     [place] Level (Transfer Goal 1, OT-IRF) modified independence  -KD     Time Frame (Transfer Goal 1, OT-IRF) long-term goal (LTG);by discharge  -KD     Progress/Outcomes (Transfer Goal 1, OT-IRF) goal not met  -KD     Row Name 06/01/23 1320          Bathing Goal 1 (OT-IRF)    Activity/Device (Bathing Goal 1, OT-IRF) lower body bathing  -KD     [place] Level (Bathing Goal 1, OT-IRF) modified independence  -KD     Time Frame (Bathing Goal 1, OT-IRF) long-term goal (LTG);by discharge  -KD     Progress/Outcomes (Bathing Goal 1, OT-IRF) goal not met  -KD     Row Name 06/01/23 1320          LB Dressing Goal 1 (OT-IRF)    Activity/Device (LB Dressing Goal 1, OT-IRF) lower body dressing  -KD     [place] (LB Dressing Goal 1, OT-IRF) modified independence  -KD     Time Frame (LB Dressing Goal 1, OT-IRF) long-term goal (LTG);by discharge  -KD     Progress/Outcomes (LB Dressing Goal 1, OT-IRF) goal not met  -KD     Row Name 06/01/23 1320          Toileting Goal 1 (OT-IRF)    Activity/Device (Toileting Goal 1, OT-IRF) toileting skills, all  -KD     [place] Level (Toileting Goal 1, OT-IRF) modified independence  -KD     Progress/Outcomes (Toileting Goal 1, OT-IRF) goal not met  -KD     Time Frame  (Toileting Goal 1, OT-IRF) long-term goal (LTG);by discharge  -KD     Row Name 06/01/23 1320           Activity Tolerance Goal 1 (OT)    Activity Tolerance Goal 1 (OT) Pt will perform ADL at sinkside for 15 mins with Mod I to increase activity tolerance  -KD     Activity Level (Endurance Goal 1, OT) 15 min activity  -KD     Time Frame (Activity Tolerance Goal 1, OT) long term goal (LTG)  -KD     Progress/Outcome (Activity Tolerance Goal 1, OT) goal not met  -KD           User Key  (r) = Recorded By, (t) = Taken By, (c) = Cosigned By    Initials Name Effective Dates    KD JimKim muse, TRINH 06/16/21 -                          OT Recommendation and Plan         Plan of Care Review  Plan of Care Reviewed With: patient  Progress: improving  Plan of Care Reviewed With: patient  Progress: improving       Outcome Measures     Row Name 06/01/23 0758 05/31/23 1251 05/31/23 0801       10 Meter Walk Test Self-Selected Velocity    Self-Selected Velocity: Trial 1 -- -- 40 sec.  -LR    Self-Selected Velocity: Trial 2 -- -- 40 sec.  -LR    Self-Selected Velocity: Trial 3 -- -- 40 sec.  -LR    Self-Selected Velocity: Average Time -- -- 40 sec.  -LR       10 Meter Walk Test Actual Velocity    Actual Self-Selected Velocity -- -- 0.15 m/s  -LR       9 Hole Peg    9-Hole Peg Left -- 45.88  -RW --    9-Hole Peg Right -- 51.2  -RW --       How much help from another person do you currently need...    Turning from your back to your side while in flat bed without using bedrails? 3  -LR -- 2  -LR    Moving from lying on back to sitting on the side of a flat bed without bedrails? 3  -LR -- 2  -LR    Moving to and from a bed to a chair (including a wheelchair)? 3  -LR -- 2  -LR    Standing up from a chair using your arms (e.g., wheelchair, bedside chair)? 3  -LR -- 2  -LR    Climbing 3-5 steps with a railing? 3  -LR -- 3  -LR    To walk in hospital room? 3  -LR -- 1  -LR    AM-PAC 6 Clicks Score (PT) 18  -LR -- 12  -LR       How much help  from another is currently needed...    Putting on and taking off regular lower body clothing? -- 2  -RW --    Bathing (including washing, rinsing, and drying) -- 2  -RW --    Toileting (which includes using toilet bed pan or urinal) -- 2  -RW --    Putting on and taking off regular upper body clothing -- 3  -RW --    Taking care of personal grooming (such as brushing teeth) -- 4  -RW --    Eating meals -- 4  -RW --    AM-PAC 6 Clicks Score (OT) -- 17  -RW --       Functional Assessment    Outcome Measure Options -- AM-PAC 6 Clicks Daily Activity (OT);9 Hole Peg  -RW 10 Meter Walk;AM-PAC 6 Clicks Basic Mobility (PT)  -LR          User Key  (r) = Recorded By, (t) = Taken By, (c) = Cosigned By    Initials Name Provider Type    LR Phong Weller Physical Therapist    RW Adelita Castellon OT Occupational Therapist                  Time Calculation:      Time Calculation- OT     Row Name 06/01/23 1320 06/01/23 0940          Time Calculation- OT    OT Start Time 1320  -KD 0940  -KD     OT Stop Time 1400  -KD 1040  -KD     OT Time Calculation (min) 40 min  -KD 60 min  -KD     Total Timed Code Minutes- OT 40 minute(s)  -KD 60 minute(s)  -KD     OT Received On 06/01/23  -KD 06/01/23  -KD        Timed Charges    22296 - OT Therapeutic Exercise Minutes 40  -KD --     67739 - OT Self Care/Mgmt Minutes -- 60  -KD        Total Minutes    Timed Charges Total Minutes 40  -KD 60  -KD      Total Minutes 40  -KD 60  -KD           User Key  (r) = Recorded By, (t) = Taken By, (c) = Cosigned By    Initials Name Provider Type    Kim Horowitz COTA Occupational Therapist Assistant              Therapy Charges for Today     Code Description Service Date Service Provider Modifiers Qty    12467082458 HC OT SELF CARE/MGMT/TRAIN EA 15 MIN 6/1/2023 Kim Fernandez COTA GO 4    40972085316 HC OT THER PROC EA 15 MIN 6/1/2023 Kim Fernandez COTA GO 3                   ZIGGY Morse  6/1/2023

## 2023-06-01 NOTE — PLAN OF CARE
Goal Outcome Evaluation:  Plan of Care Reviewed With: patient        Progress: improving     Pt self propelled w/c ~110' SBA w/ good tolerance. Pt then completed 10 sets x 15 reps on cable column w/ good tolerance allowing RB's PRN. Fxl mobility ~90' w/ 4 wheel walker around gym w/ good tolerance. Pt sitting in gym @ end of tx.

## 2023-06-02 ENCOUNTER — APPOINTMENT (OUTPATIENT)
Dept: CT IMAGING | Facility: HOSPITAL | Age: 63
DRG: 074 | End: 2023-06-02
Payer: MEDICARE

## 2023-06-02 LAB
ALBUMIN SERPL-MCNC: 3.9 G/DL (ref 3.5–5.2)
ALBUMIN/GLOB SERPL: 1.2 G/DL
ALP SERPL-CCNC: 113 U/L (ref 39–117)
ALT SERPL W P-5'-P-CCNC: 19 U/L (ref 1–41)
ANION GAP SERPL CALCULATED.3IONS-SCNC: 14 MMOL/L (ref 5–15)
AST SERPL-CCNC: 21 U/L (ref 1–40)
BASOPHILS # BLD AUTO: 0.03 10*3/MM3 (ref 0–0.2)
BASOPHILS NFR BLD AUTO: 0.5 % (ref 0–1.5)
BILIRUB SERPL-MCNC: 0.9 MG/DL (ref 0–1.2)
BUN SERPL-MCNC: 24 MG/DL (ref 8–23)
BUN/CREAT SERPL: 20 (ref 7–25)
CALCIUM SPEC-SCNC: 8.7 MG/DL (ref 8.6–10.5)
CHLORIDE SERPL-SCNC: 98 MMOL/L (ref 98–107)
CO2 SERPL-SCNC: 22 MMOL/L (ref 22–29)
CREAT SERPL-MCNC: 1.2 MG/DL (ref 0.76–1.27)
D-LACTATE SERPL-SCNC: 1.5 MMOL/L (ref 0.5–2)
DEPRECATED RDW RBC AUTO: 47.6 FL (ref 37–54)
EGFRCR SERPLBLD CKD-EPI 2021: 68.8 ML/MIN/1.73
EOSINOPHIL # BLD AUTO: 0.01 10*3/MM3 (ref 0–0.4)
EOSINOPHIL NFR BLD AUTO: 0.2 % (ref 0.3–6.2)
ERYTHROCYTE [DISTWIDTH] IN BLOOD BY AUTOMATED COUNT: 14.9 % (ref 12.3–15.4)
GLOBULIN UR ELPH-MCNC: 3.2 GM/DL
GLUCOSE BLDC GLUCOMTR-MCNC: 120 MG/DL (ref 70–130)
GLUCOSE BLDC GLUCOMTR-MCNC: 140 MG/DL (ref 70–130)
GLUCOSE BLDC GLUCOMTR-MCNC: 146 MG/DL (ref 70–130)
GLUCOSE BLDC GLUCOMTR-MCNC: 180 MG/DL (ref 70–130)
GLUCOSE SERPL-MCNC: 125 MG/DL (ref 65–99)
HCT VFR BLD AUTO: 32.7 % (ref 37.5–51)
HGB BLD-MCNC: 10.6 G/DL (ref 13–17.7)
HOLD SPECIMEN: NORMAL
IMM GRANULOCYTES # BLD AUTO: 0.02 10*3/MM3 (ref 0–0.05)
IMM GRANULOCYTES NFR BLD AUTO: 0.4 % (ref 0–0.5)
LYMPHOCYTES # BLD AUTO: 0.8 10*3/MM3 (ref 0.7–3.1)
LYMPHOCYTES NFR BLD AUTO: 14.1 % (ref 19.6–45.3)
MCH RBC QN AUTO: 27.9 PG (ref 26.6–33)
MCHC RBC AUTO-ENTMCNC: 32.4 G/DL (ref 31.5–35.7)
MCV RBC AUTO: 86.1 FL (ref 79–97)
MONOCYTES # BLD AUTO: 0.58 10*3/MM3 (ref 0.1–0.9)
MONOCYTES NFR BLD AUTO: 10.2 % (ref 5–12)
NEUTROPHILS NFR BLD AUTO: 4.24 10*3/MM3 (ref 1.7–7)
NEUTROPHILS NFR BLD AUTO: 74.6 % (ref 42.7–76)
NRBC BLD AUTO-RTO: 0 /100 WBC (ref 0–0.2)
PLATELET # BLD AUTO: 152 10*3/MM3 (ref 140–450)
PMV BLD AUTO: 10.2 FL (ref 6–12)
POTASSIUM SERPL-SCNC: 4.1 MMOL/L (ref 3.5–5.2)
PROCALCITONIN SERPL-MCNC: 0.69 NG/ML (ref 0–0.25)
PROT SERPL-MCNC: 7.1 G/DL (ref 6–8.5)
RBC # BLD AUTO: 3.8 10*6/MM3 (ref 4.14–5.8)
SODIUM SERPL-SCNC: 134 MMOL/L (ref 136–145)
WBC NRBC COR # BLD: 5.68 10*3/MM3 (ref 3.4–10.8)

## 2023-06-02 PROCEDURE — 80053 COMPREHEN METABOLIC PANEL: CPT | Performed by: ORTHOPAEDIC SURGERY

## 2023-06-02 PROCEDURE — 97530 THERAPEUTIC ACTIVITIES: CPT

## 2023-06-02 PROCEDURE — 97535 SELF CARE MNGMENT TRAINING: CPT

## 2023-06-02 PROCEDURE — 87186 SC STD MICRODIL/AGAR DIL: CPT | Performed by: ORTHOPAEDIC SURGERY

## 2023-06-02 PROCEDURE — 70450 CT HEAD/BRAIN W/O DYE: CPT

## 2023-06-02 PROCEDURE — 84145 PROCALCITONIN (PCT): CPT | Performed by: ORTHOPAEDIC SURGERY

## 2023-06-02 PROCEDURE — 87150 DNA/RNA AMPLIFIED PROBE: CPT | Performed by: ORTHOPAEDIC SURGERY

## 2023-06-02 PROCEDURE — 63710000001 INSULIN ASPART PER 5 UNITS: Performed by: ORTHOPAEDIC SURGERY

## 2023-06-02 PROCEDURE — 97110 THERAPEUTIC EXERCISES: CPT

## 2023-06-02 PROCEDURE — 63710000001 ONDANSETRON PER 8 MG: Performed by: ORTHOPAEDIC SURGERY

## 2023-06-02 PROCEDURE — 83605 ASSAY OF LACTIC ACID: CPT | Performed by: ORTHOPAEDIC SURGERY

## 2023-06-02 PROCEDURE — 85025 COMPLETE CBC W/AUTO DIFF WBC: CPT | Performed by: ORTHOPAEDIC SURGERY

## 2023-06-02 PROCEDURE — 82948 REAGENT STRIP/BLOOD GLUCOSE: CPT

## 2023-06-02 PROCEDURE — 74176 CT ABD & PELVIS W/O CONTRAST: CPT

## 2023-06-02 PROCEDURE — 87040 BLOOD CULTURE FOR BACTERIA: CPT | Performed by: ORTHOPAEDIC SURGERY

## 2023-06-02 RX ORDER — INSULIN ASPART 100 [IU]/ML
2-10 INJECTION, SOLUTION INTRAVENOUS; SUBCUTANEOUS
Status: DISCONTINUED | OUTPATIENT
Start: 2023-06-02 | End: 2023-06-03 | Stop reason: HOSPADM

## 2023-06-02 RX ORDER — BISACODYL 5 MG/1
10 TABLET, DELAYED RELEASE ORAL ONCE
Status: COMPLETED | OUTPATIENT
Start: 2023-06-02 | End: 2023-06-03

## 2023-06-02 RX ADMIN — TAMSULOSIN HYDROCHLORIDE 0.4 MG: 0.4 CAPSULE ORAL at 21:08

## 2023-06-02 RX ADMIN — SODIUM BICARBONATE 650 MG: 650 TABLET ORAL at 21:08

## 2023-06-02 RX ADMIN — ACETAMINOPHEN 650 MG: 325 TABLET, FILM COATED ORAL at 19:12

## 2023-06-02 RX ADMIN — ONDANSETRON HYDROCHLORIDE 4 MG: 4 TABLET, FILM COATED ORAL at 12:05

## 2023-06-02 RX ADMIN — SODIUM BICARBONATE 650 MG: 650 TABLET ORAL at 08:07

## 2023-06-02 RX ADMIN — FLUTICASONE PROPIONATE 2 SPRAY: 50 SPRAY, METERED NASAL at 08:07

## 2023-06-02 RX ADMIN — Medication 5000 UNITS: at 21:08

## 2023-06-02 RX ADMIN — MAGNESIUM HYDROXIDE 10 ML: 2400 SUSPENSION ORAL at 08:06

## 2023-06-02 RX ADMIN — Medication 400 MG: at 08:05

## 2023-06-02 RX ADMIN — TAMSULOSIN HYDROCHLORIDE 0.4 MG: 0.4 CAPSULE ORAL at 08:05

## 2023-06-02 RX ADMIN — METOPROLOL SUCCINATE 25 MG: 25 TABLET, FILM COATED, EXTENDED RELEASE ORAL at 08:05

## 2023-06-02 RX ADMIN — CETIRIZINE HYDROCHLORIDE 10 MG: 10 TABLET, FILM COATED ORAL at 08:06

## 2023-06-02 RX ADMIN — DRONEDARONE 400 MG: 400 TABLET, FILM COATED ORAL at 17:18

## 2023-06-02 RX ADMIN — Medication 1000 MG: at 21:08

## 2023-06-02 RX ADMIN — APIXABAN 5 MG: 5 TABLET, FILM COATED ORAL at 08:06

## 2023-06-02 RX ADMIN — INSULIN ASPART 2 UNITS: 100 INJECTION, SOLUTION INTRAVENOUS; SUBCUTANEOUS at 06:50

## 2023-06-02 RX ADMIN — GLIPIZIDE 10 MG: 10 TABLET ORAL at 06:46

## 2023-06-02 RX ADMIN — APIXABAN 5 MG: 5 TABLET, FILM COATED ORAL at 21:07

## 2023-06-02 RX ADMIN — ACETAMINOPHEN 650 MG: 325 TABLET, FILM COATED ORAL at 05:55

## 2023-06-02 RX ADMIN — BISACODYL 10 MG: 10 SUPPOSITORY RECTAL at 16:00

## 2023-06-02 RX ADMIN — METOPROLOL SUCCINATE 25 MG: 25 TABLET, FILM COATED, EXTENDED RELEASE ORAL at 21:08

## 2023-06-02 RX ADMIN — Medication 400 MG: at 21:07

## 2023-06-02 RX ADMIN — DOCUSATE SODIUM 100 MG: 100 CAPSULE, LIQUID FILLED ORAL at 21:08

## 2023-06-02 RX ADMIN — DOCUSATE SODIUM 100 MG: 100 CAPSULE, LIQUID FILLED ORAL at 08:06

## 2023-06-02 NOTE — THERAPY TREATMENT NOTE
Patient Name: Emre Lisa  : 1960    MRN: [id number]                              Today's Date: 2023       Admit Date: 2023    Visit Dx:     ICD-10-CM ICD-9-CM   1. Symbolic dysfunction  R48.9 784.60   2. Impaired functional mobility, balance, gait, and endurance  Z74.09 V49.89   3. Impaired mobility and ADLs  Z74.09 V49.89    Z78.9      Patient Active Problem List   Diagnosis   • Foot osteomyelitis, right   • Nodule of groin   • Type 2 diabetes mellitus with skin complication   • Status post transmetatarsal amputation of right foot   • Hammer toe of left foot   • Hallux malleus of left foot   • Cellulitis of left foot   • Infected hardware in left leg   • Chronic multifocal osteomyelitis of left foot   • Charcot's joint of left foot   • Skin ulcer of left foot, limited to breakdown of skin   • Dyspnea   • Syncope   • Acute respiratory failure with hypoxia   • Tachycardia   • Neuropathy due to secondary diabetes mellitus   • Atrial fibrillation   • Presence of biventricular cardiac pacemaker   • Hypertension   • Medically complex patient     Past Medical History:   Diagnosis Date   • Arthritis    • Atrial fibrillation    • Diabetes mellitus    • Diabetic foot ulcer associated with type 2 diabetes mellitus     left great toe   • Hallux malleus of left foot    • Hammer toe    • Hypertension    • MI (myocardial infarction)    • Neuropathy in diabetes    • Onychomycosis    • Presence of biventricular cardiac pacemaker    • Rheumatoid arthritis      Past Surgical History:   Procedure Laterality Date   • AMPUTATION DIGIT Right 2017    Procedure: RIGHT AMPUTATION TRANSMETATRASAL , RECESSION GASTROCNEMOUS;  Surgeon: Marco A Thompson DPM;  Location: [facility] OR;  Service:    • ANKLE FUSION Left 2023    Procedure: REDUCTION OF LEFT ANKLE DEFORMITY WITH APPLICATION OF EXTERNAL FIXATOR;  Surgeon: Marco A Thompson DPM;  Location: [facility] OR;  Service: Podiatry;  Laterality: Left;   • CARDIAC  CATHETERIZATION     • CARDIAC DEFIBRILLATOR PLACEMENT  2010, 2016   • CARPAL TUNNEL RELEASE  2015    elbow and wrist left arm   • FOOT FUSION Left 05/15/2023    Procedure: REMOVAL OF EXTERNAL FIXATOR LEFT LOWER EXTREMITY WITH TIBIAL TALOCALCANEAL ARTHRODESIS  AND ALL INDICATED PROCEDURES;  Surgeon: Marco A Thompson DPM;  Location: [facility] OR;  Service: Podiatry;  Laterality: Left;   • FOOT IRRIGATION, DEBRIDEMENT AND REPAIR Left 03/07/2018    Procedure: FOOT IRRIGATION, DEBRIDEMENT AND REPAIR;  Surgeon: Marco A Thompson DPM;  Location: [facility] OR;  Service:    • FOOT IRRIGATION, DEBRIDEMENT AND REPAIR Left 03/10/2018    Procedure: FOOT IRRIGATION, DEBRIDEMENT AND REPAIR;  Surgeon: Marco A Thompson DPM;  Location: [facility] OR;  Service: Podiatry   • FOOT WOUND CLOSURE Right 03/02/2017    Procedure: INCISION AND DRAINAGE, RIGHT FOOT;  Surgeon: Tung Rosenthal DPM;  Location: [facility] OR;  Service:    • HAMMER TOE REPAIR Left 02/15/2018    Procedure: HAMMERTOE CORRECTION LEFT SECOND, THIRD AND FOURTH TOES AND HALLUX INTERPHALANGEAL JOINT ARTHRODESIS LEFT FOOT (Micro Asnis, 4.0 & 5.0 Asnis)      (c-arm);  Surgeon: Marco A Thompson DPM;  Location: [facility] OR;  Service:    • HARDWARE REMOVAL Left 03/05/2018    Procedure: ANKLE/FOOT HARDWARE REMOVAL;  Surgeon: Marco A Thompson DPM;  Location: [facility] OR;  Service:    • INCISION AND DRAINAGE LEG Left 03/05/2018    Procedure: INCISION AND DRAINAGE LOWER EXTREMITY;  Surgeon: Marco A Thompson DPM;  Location: [facility] OR;  Service:    • PLANTAR FASCIA RELEASE Left 11/21/2019    Procedure: PLANTAR PLANING LEFT FOOT AND ALL OTHER INDICATED PROCEDURES;  Surgeon: Marco A Thompson DPM;  Location: [facility];  Service: Podiatry   • TOE AMPUTATION Right 2016   • TONSILECTOMY, ADENOIDECTOMY, BILATERAL MYRINGOTOMY AND TUBES      age 25      General Information     Row Name 06/02/23 0945          OT Time and Intention    Document Type therapy note (daily note)  -KD     Mode of Treatment individual  therapy;occupational therapy  -KD     Row Name 06/02/23 0945          General Information    Patient Profile Reviewed yes  -KD     Existing Precautions/Restrictions fall  -KD     Row Name 06/02/23 0945          Cognition    Orientation Status (Cognition) oriented x 4  -KD     Row Name 06/02/23 0945          Safety Issues, Functional Mobility    Impairments Affecting Function (Mobility) balance;endurance/activity tolerance;coordination;pain;postural/trunk control;range of motion (ROM);strength  -KD           User Key  (r) = Recorded By, (t) = Taken By, (c) = Cosigned By    Initials Name Provider Type    Kim Horowitz COTA Occupational Therapist Assistant                 Mobility/ADL's     Row Name 06/02/23 0945          Mobility    Extremity Weight-bearing Status left lower extremity  -KD     Left Lower Extremity (Weight-bearing Status) weight-bearing as tolerated (WBAT)  -KD           User Key  (r) = Recorded By, (t) = Taken By, (c) = Cosigned By    Initials Name Provider Type    Kim Horowitz COTA Occupational Therapist Assistant               Obj/Interventions    No documentation.                Goals/Plan    No documentation.                Clinical Impression     Row Name 06/02/23 1154          Pain Assessment    Pretreatment Pain Rating 0/10 - no pain  -KD     Posttreatment Pain Rating 0/10 - no pain  -KD     Row Name 06/02/23 1154          Plan of Care Review    Plan of Care Reviewed With patient  -KD     Progress no change  -KD     Outcome Evaluation OT tx completed this date. Pt up in w/c upon arrival. Pt required set up only for urinal use. Pt pterformed squat t/f from w/c to shower w/ CGA. Pt completed UB/LB bathing while seated on shower bench w/ LH sponge and hand held shower. Pt required Min A for UB dressing. Pt Dep for LB dressing. Pt then self propelled in w/c from room to gym ~120' SBA. No goals met this date. Cont OT POC.  -KD     Row Name 06/02/23 1156          Therapy Assessment/Plan  (OT)    Therapy Frequency (OT) other (see comments)  5-7 d/wk  -KD     Row Name 06/02/23 1154          Vital Signs    Pre Systolic BP Rehab --  -KD           User Key  (r) = Recorded By, (t) = Taken By, (c) = Cosigned By    Initials Name Provider Type    Kim Horowitz COTA Occupational Therapist Assistant               Outcome Measures     Row Name 06/02/23 0945          How much help from another is currently needed...    Putting on and taking off regular lower body clothing? 2  -KD     Bathing (including washing, rinsing, and drying) 2  -KD     Toileting (which includes using toilet bed pan or urinal) 2  -KD     Putting on and taking off regular upper body clothing 3  -KD     Taking care of personal grooming (such as brushing teeth) 4  -KD     Eating meals 4  -KD     AM-PAC 6 Clicks Score (OT) 17  -KD           User Key  (r) = Recorded By, (t) = Taken By, (c) = Cosigned By    Initials Name Provider Type    Kim Horowitz COTA Occupational Therapist Assistant                  OT Recommendation and Plan  Therapy Frequency (OT): other (see comments) (5-7 d/wk)  Plan of Care Review  Plan of Care Reviewed With: patient  Progress: no change  Outcome Evaluation: OT tx completed this date. Pt up in w/c upon arrival. Pt required set up only for urinal use. Pt pterformed squat t/f from w/c to shower w/ CGA. Pt completed UB/LB bathing while seated on shower bench w/ LH sponge and hand held shower. Pt required Min A for UB dressing. Pt Dep for LB dressing. Pt then self propelled in w/c from room to gym ~120' SBA. No goals met this date. Cont OT POC.     Time Calculation:    Time Calculation- OT     Row Name 06/02/23 0945             Time Calculation- OT    OT Start Time 0945  -KD      OT Stop Time 1115  -KD      OT Time Calculation (min) 90 min  -KD      Total Timed Code Minutes- OT 90 minute(s)  -KD      OT Received On 06/02/23  -KD         Timed Charges    61838 - OT Self Care/Mgmt Minutes 90  -KD         Total  Minutes    Timed Charges Total Minutes 90  -KD       Total Minutes 90  -KD            User Key  (r) = Recorded By, (t) = Taken By, (c) = Cosigned By    Initials Name Provider Type    Kim Horowitz COTA Occupational Therapist Assistant              Therapy Charges for Today     Code Description Service Date Service Provider Modifiers Qty    05445400832 HC OT SELF CARE/MGMT/TRAIN EA 15 MIN 6/1/2023 Kim Fernandez COTA GO 4    85066136229 HC OT THER PROC EA 15 MIN 6/1/2023 Kim Fernandez COTA GO 3    49342429181 HC OT SELF CARE/MGMT/TRAIN EA 15 MIN 6/2/2023 Kim Fernandez COTA GO 6               ZIGGY Morse  6/2/2023

## 2023-06-02 NOTE — PROGRESS NOTES
Progress Note      Admit date: 5/30/2023  2:34 PM       Treatment Team     Provider Relationship Specialty Contact    Javi Alfaro MD Attending Orthopedic Surgery   [phone]      Jp Abarca PTA Physical Therapy Assistant Physical Therapy --    Kim Fernandez COTA Occupational Therapy Assistant Occupational Therapy --    Marco A Thompson DPM Consulting Physician Podiatry   [phone]      Tung Melissa RN Registered Nurse --   [phone]      Dick Valdes BSW  -- --    Marisol Uribe RN Registered Nurse -- --    Lucy Villanueva LPN Licensed Practical Nurse -- --             Chief Complaint:  Medically complex patient    HPI: No changes since admission    Vital Signs  Temp:  [97.8 °F (36.6 °C)-99.9 °F (37.7 °C)] 99.9 °F (37.7 °C)  Heart Rate:  [] 99  Resp:  [18] 18  BP: (119-136)/(64-76) 119/64    Physical Exam 1:    Constitutional: Alert stable under block to the airflow temperature and trended down in his room but very cooperative     HENT:      Eyes:     Neck:      Cardiovascular: No chest pain    Pulmonary: Cough sputum production    Abdominal:      Genitourinary/Anorectal:       Musculoskeletal: No evidence of DVT wounds healing well no infection leg is being cared for by wound care    Skin:     Neurological:      Psychiatric/Behavioral: Cooperative physical therapy       Results Review:    I reviewed the patient's new clinical results.    Nursing notes and therapy notes have been reviewed.    I have reviewed medications.    Assessment/Plan  Principal Problem:    Medically complex patient indications for admission problem is that the diabetes has resulted in a Charcot foot secondary to his neuropathy but he has also other medical significant problems atrial fib.  Any allergic reaction to heparin DVT blood clots requiring medical care ill was long-term on LTAC following this episode after correction of his of his Charcot foot deformity by Dr. Thompson  Active  Problems:    Type 2 diabetes mellitus with skin complication    Charcot's joint of left foot    Neuropathy due to secondary diabetes mellitus    Atrial fibrillation    Presence of biventricular cardiac pacemaker    Hypertension           Javi Alfaro MD  06/02/23  09:52 CDT          This document has been electronically signed by Javi Alfaro MD on June 2, 2023 09:52 CDT      Part of this note may be an electronic transcription/translation of spoken language to printed text using the Dragon Dictation System.

## 2023-06-02 NOTE — PLAN OF CARE
Goal Outcome Evaluation:      Pt lacks motivation with self care; Encouragement given; Pt up in WC sleeping did not want to go to bed; Pt did sleep better when in bed

## 2023-06-02 NOTE — PLAN OF CARE
Goal Outcome Evaluation:  Plan of Care Reviewed With: patient        Progress: no change  Outcome Evaluation: OT tx completed this date. Pt up in w/c upon arrival. Pt required set up only for urinal use. Pt pterformed squat t/f from w/c to shower w/ CGA. Pt completed UB/LB bathing while seated on shower bench w/ LH sponge and hand held shower. Pt required Min A for UB dressing. Pt Dep for LB dressing. Pt then self propelled in w/c from room to gym ~120' SBA. No goals met this date. Cont OT POC.

## 2023-06-02 NOTE — THERAPY TREATMENT NOTE
Inpatient Rehabilitation - Physical Therapy Treatment Note       [facility]     Patient Name: Emre Lisa  : 1960  MRN: [id number]    Today's Date: 2023                    Admit Date: 2023      Visit Dx:     ICD-10-CM ICD-9-CM   1. Symbolic dysfunction  R48.9 784.60   2. Impaired functional mobility, balance, gait, and endurance  Z74.09 V49.89   3. Impaired mobility and ADLs  Z74.09 V49.89    Z78.9        Patient Active Problem List   Diagnosis   • Foot osteomyelitis, right   • Nodule of groin   • Type 2 diabetes mellitus with skin complication   • Status post transmetatarsal amputation of right foot   • Hammer toe of left foot   • Hallux malleus of left foot   • Cellulitis of left foot   • Infected hardware in left leg   • Chronic multifocal osteomyelitis of left foot   • Charcot's joint of left foot   • Skin ulcer of left foot, limited to breakdown of skin   • Dyspnea   • Syncope   • Acute respiratory failure with hypoxia   • Tachycardia   • Neuropathy due to secondary diabetes mellitus   • Atrial fibrillation   • Presence of biventricular cardiac pacemaker   • Hypertension   • Medically complex patient       Past Medical History:   Diagnosis Date   • Arthritis    • Atrial fibrillation    • Diabetes mellitus    • Diabetic foot ulcer associated with type 2 diabetes mellitus     left great toe   • Hallux malleus of left foot    • Hammer toe    • Hypertension    • MI (myocardial infarction)    • Neuropathy in diabetes    • Onychomycosis    • Presence of biventricular cardiac pacemaker    • Rheumatoid arthritis        Past Surgical History:   Procedure Laterality Date   • AMPUTATION DIGIT Right 2017    Procedure: RIGHT AMPUTATION TRANSMETATRASAL , RECESSION GASTROCNEMOUS;  Surgeon: Marco A Thompson DPM;  Location: [facility];  Service:    • ANKLE FUSION Left 2023    Procedure: REDUCTION OF LEFT ANKLE DEFORMITY WITH APPLICATION OF EXTERNAL FIXATOR;  Surgeon: Marco A Thompson DPM;   Location: [facility] OR;  Service: Podiatry;  Laterality: Left;   • CARDIAC CATHETERIZATION     • CARDIAC DEFIBRILLATOR PLACEMENT  2010, 2016   • CARPAL TUNNEL RELEASE  2015    elbow and wrist left arm   • FOOT FUSION Left 05/15/2023    Procedure: REMOVAL OF EXTERNAL FIXATOR LEFT LOWER EXTREMITY WITH TIBIAL TALOCALCANEAL ARTHRODESIS  AND ALL INDICATED PROCEDURES;  Surgeon: Marco A Thompson DPM;  Location: [facility] OR;  Service: Podiatry;  Laterality: Left;   • FOOT IRRIGATION, DEBRIDEMENT AND REPAIR Left 03/07/2018    Procedure: FOOT IRRIGATION, DEBRIDEMENT AND REPAIR;  Surgeon: Marco A Thompson DPM;  Location: [facility] OR;  Service:    • FOOT IRRIGATION, DEBRIDEMENT AND REPAIR Left 03/10/2018    Procedure: FOOT IRRIGATION, DEBRIDEMENT AND REPAIR;  Surgeon: Marco A Thompson DPM;  Location: [facility];  Service: Podiatry   • FOOT WOUND CLOSURE Right 03/02/2017    Procedure: INCISION AND DRAINAGE, RIGHT FOOT;  Surgeon: Tung Rosenthal DPM;  Location: [facility] OR;  Service:    • HAMMER TOE REPAIR Left 02/15/2018    Procedure: HAMMERTOE CORRECTION LEFT SECOND, THIRD AND FOURTH TOES AND HALLUX INTERPHALANGEAL JOINT ARTHRODESIS LEFT FOOT (Micro Asnis, 4.0 & 5.0 Asnis)      (c-arm);  Surgeon: Marco A Thompson DPM;  Location: [facility] OR;  Service:    • HARDWARE REMOVAL Left 03/05/2018    Procedure: ANKLE/FOOT HARDWARE REMOVAL;  Surgeon: Marco A Thompson DPM;  Location: [facility];  Service:    • INCISION AND DRAINAGE LEG Left 03/05/2018    Procedure: INCISION AND DRAINAGE LOWER EXTREMITY;  Surgeon: Marco A Thompson DPM;  Location: [facility] OR;  Service:    • PLANTAR FASCIA RELEASE Left 11/21/2019    Procedure: PLANTAR PLANING LEFT FOOT AND ALL OTHER INDICATED PROCEDURES;  Surgeon: Marco A Thompson DPM;  Location: [facility] OR;  Service: Podiatry   • TOE AMPUTATION Right 2016   • TONSILECTOMY, ADENOIDECTOMY, BILATERAL MYRINGOTOMY AND TUBES      age 25       PT ASSESSMENT (last 12 hours)     IRF PT Evaluation and Treatment     Row Name 06/02/23  "1335 06/02/23 1125       PT Time and Intention    Document Type daily treatment  - daily treatment  -    Mode of Treatment individual therapy;physical therapy  - individual therapy;physical therapy  -    Row Name 06/02/23 1335 06/02/23 1125       General Information    Patient Profile Reviewed yes  -JA yes  -ANTHONY    Existing Precautions/Restrictions fall  -JA fall  -ANTHONY    Comment, General Information Pt. slightly more alert this p.m., but cont's to be lethargic, but decrease nausea this p.m.  -JA Pt. very lethargic this a.m. sitting up in w/c in gym after OT treatment and c/o \"cold\" pt. shivering   -    Row Name 06/02/23 1335 06/02/23 1125       Pain Assessment    Pretreatment Pain Rating 0/10 - no pain  - 8/10  -    Posttreatment Pain Rating 0/10 - no pain  - --  no pain rating given  -    Pain Location - -- --   Pt. states, \" hard to say\"  -ANTHONY    Pre/Posttreatment Pain Comment -- Pt. repositioned this a.m.  -    Row Name 06/02/23 1335 06/02/23 1125       Cognition/Psychosocial    Affect/Mental Status (Cognition) low arousal/lethargic  - low arousal/lethargic  -    Orientation Status (Cognition) oriented x 4  - oriented x 4  -    Follows Commands (Cognition) WFL  - WFL  -    Row Name 06/02/23 1335 06/02/23 1125       Mobility    Extremity Weight-bearing Status left lower extremity  - left lower extremity  -    Left Lower Extremity (Weight-bearing Status) weight-bearing as tolerated (WBAT)  - weight-bearing as tolerated (WBAT)  -    Row Name 06/02/23 1335 06/02/23 1125       Bed Mobility    Supine-Sit [place] (Bed Mobility) moderate assist (50% patient effort)  -ANTHONY --    Sit-Supine [place] (Bed Mobility) -- minimum assist (75% patient effort);2 person assist  -ANTHONY    Assistive Device (Bed Mobility) head of bed elevated;bed rails  -ANTHONY head of bed elevated;bed rails  -ANTHONY    Comment, (Bed Mobility) Pt. SBA with sitting EOB, but increase time and required assist at UB to " achieve sitting position on EOB this p.m.  -JA Upon transferring to supine pt. requesting to sit up, upon sitting back up to EOB pt. with episode of N/V with nsg. present   -JA    Row Name 06/02/23 1335 06/02/23 1125       Transfer Assessment/Treatment    Transfers sit-stand transfer;stand-sit transfer;bed-chair transfer  -JA chair-bed transfer  -JA    Comment, (Transfers) Pt. stood at EOB with RW to use urinal this p.m., pt. required assist for performance this p.m.  -JA --    Row Name 06/02/23 1335 06/02/23 1125       Bed-Chair Transfer    Bed-Chair [place] (Transfers) minimum assist (75% patient effort)  -ANTHONY --  -ANTHONY    Assistive Device (Bed-Chair Transfers) walker, front-wheeled  -JA --  -JA    Row Name 06/02/23 1125          Chair-Bed Transfer    Chair-Bed [place] (Transfers) minimum assist (75% patient effort);2 person assist  -ANTHONY     Assistive Device (Chair-Bed Transfers) walker, front-wheeled  -JA     Row Name 06/02/23 1335 06/02/23 1125       Sit-Stand Transfer    Sit-Stand [place] (Transfers) minimum assist (75% patient effort);verbal cues  -ANTHONY minimum assist (75% patient effort);verbal cues  -ANTHONY    Assistive Device (Sit-Stand Transfers) walker, front-wheeled  -JA walker, front-wheeled  -JA    Row Name 06/02/23 1335 06/02/23 1125       Stand-Sit Transfer    Stand-Sit [place] (Transfers) minimum assist (75% patient effort);2 person assist;verbal cues  -ANTHONY minimum assist (75% patient effort);2 person assist;verbal cues  -ANTHONY    Assistive Device (Stand-Sit Transfers) walker, front-wheeled  -JA walker, front-wheeled  -JA    Row Name 06/02/23 1125          Gait/Stairs (Locomotion)    Comment, (Gait/Stairs) Pt. deferred this a.m. and signee deferred due to pt. too drowsy this a.m.  -JA     Row Name 06/02/23 1125          Wheelchair Mobility/Management    Comment, Parts Management (Wheelchair) Pt. unable to perform this a.m. due to inability to follow commands pt. Depx1 to transfer back to room  from gym to transfer to bed per pt. request due to pt. c/o fatigue   -     Row Name 06/02/23 1335          Safety Issues, Functional Mobility    Safety Issues Affecting Function (Mobility) ability to follow commands;safety precaution awareness;safety precautions follow-through/compliance;sequencing abilities  -     Impairments Affecting Function (Mobility) balance;endurance/activity tolerance;postural/trunk control;strength  -     Row Name 06/02/23 1335 06/02/23 1125       Motor Skills    Therapeutic Exercise --  LAQ, seated marches, Ham pulls lvl 3, [place], pillow squeezes  - --  LAQ, seated marches, Ham pulls lvl 3 at R  -    Row Name 06/02/23 1335 06/02/23 1125       Positioning and Restraints    Pre-Treatment Position in bed  - sitting in chair/recliner  -    Post Treatment Position wheelchair  - bed  -    In Bed -- supine;fowlers;with nsg  -    In Wheelchair sitting;call light within reach;encouraged to call for assist;exit alarm on  - --    Row Name 06/02/23 1335 06/02/23 1125       Vital Signs    Pre Systolic BP Rehab 125  - 138  -    Pre Treatment Diastolic BP 72  - 78  -    Pretreatment Heart Rate (beats/min) 108  - --    Pre SpO2 (%) 98  - --    O2 Delivery Pre Treatment room air  - --    Pre Patient Position Supine  - Sitting  -    Intra Patient Position Standing  - Standing  -    Post Patient Position Sitting  - Supine  -    Row Name 06/02/23 1335          IRF PT Goals    Bed Mobility Goal Selection (PT-IRF) bed mobility, PT goal 1  -     Transfer Goal Selection (PT-IRF) transfers, PT goal 1  -     Gait (Walking Locomotion) Goal Selection (PT-IRF) gait, PT goal 1;gait, PT goal 2;gait, PT goal (free text)  -     Stairs Goal Selection (PT-IRF) stairs, PT goal 1  -     Row Name 06/02/23 1335          Bed Mobility Goal 1 (PT-IRF)    Activity/Assistive Device (Bed Mobility Goal 1, PT-IRF) supine to sit;sit to supine  -     [place] Level (Bed  Mobility Goal 1, PT-IRF) independent  -JA     Time Frame (Bed Mobility Goal 1, PT-IRF) 2 weeks  -JA     Progress/Outcomes (Bed Mobility Goal 1, PT-IRF) goal not met  -JA     Row Name 06/02/23 1335          Transfer Goal 1 (PT-IRF)    Activity/Assistive Device (Transfer Goal 1, PT-IRF) sit-to-stand/stand-to-sit;bed-to-chair/chair-to-bed  -JA     [place] Level (Transfer Goal 1, PT-IRF) modified independence  -JA     Time Frame (Transfer Goal 1, PT-IRF) 2 weeks  -JA     Progress/Outcomes (Transfer Goal 1, PT-IRF) goal not met  -     Row Name 06/02/23 1335          Gait/Walking Locomotion Goal 1 (PT-IRF)    Activity/Assistive Device (Gait/Walking Locomotion Goal 1, PT-IRF) assistive device use;gait (walking locomotion)  -     Gait/Walking Locomotion Distance Goal 1 (PT-IRF) 150'x1  -JA     [place] Level (Gait/Walking Locomotion Goal 1, PT-IRF) standby assist  -     Time Frame (Gait/Walking Locomotion Goal 1, PT-IRF) 3 weeks;long-term goal (LTG)  -     Progress/Outcomes (Gait/Walking Locomotion Goal 1, PT-IRF) goal not met  -     Row Name 06/02/23 3371          Gait/Walking Locomotion Goal 2 (PT-IRF)    Activity/Assistive Device (Gait/Walking Locomotion Goal 2, PT-IRF) gait (walking locomotion);assistive device use  -     Gait/Walking Locomotion Distance Goal 2 (PT-IRF) 50'x1  -JA     [place] Level (Gait/Walking Locomotion Goal 2, PT-IRF) modified independence  -     Time Frame (Gait/Walking Locomotion Goal 2, PT-IRF) 2 weeks;short-term goal (STG)  -     Progress/Outcomes (Gait/Walking Locomotion Goal 2, PT-IRF) goal not met  -     Row Name 06/02/23 2662          Gait/Walking Locomotion Goal (PT-IRF)    Gait/Walking Locomotion Goal (PT-IRF) Patient will improve 10 meter walk test to 0.8 m/s to show decreased fall risk and reduced hospitilization risk  -     Time Frame (Gait/Walking Locomotion Goal, PT-IRF) 2 weeks  -JA     Progress/Outcomes (Gait/Walking Locomotion Goal, PT-IRF) goal  partially met  -JA     Row Name 06/02/23 5104          Stairs Goal 1 (PT-IRF)    Activity/Assistive Device (Stairs Goal 1, PT-IRF) stairs, all skills;ascending stairs;descending stairs  -JA     Number of Stairs (Stairs Goal 1, PT-IRF) 4  -JA     [place] Level (Stairs Goal 1, PT-IRF) standby assist  -JA     Time Frame (Stairs Goal 1, PT-IRF) 2 weeks  -JA     Strategies/Barriers (Stairs Goal 1, PT-IRF) No hand rails at home  -JA     Progress/Outcomes (Stairs Goal 1, PT-IRF) goal not met  -JA           User Key  (r) = Recorded By, (t) = Taken By, (c) = Cosigned By    Initials Name Provider Type    Jp Jc PTA Physical Therapist Assistant              Wound 05/15/23 1546 Left anterior foot Incision (Active)   Dressing Appearance dry;intact 06/02/23 0722   Closure KO 06/02/23 0722   Drainage Amount none 06/02/23 0722     Physical Therapy Education     Title: PT OT SLP Therapies (In Progress)     Topic: Physical Therapy (Not Started)     Point: Mobility training (Not Started)     Learner Progress:  Not documented in this visit.          Point: Home exercise program (Not Started)     Learner Progress:  Not documented in this visit.          Point: Body mechanics (Not Started)     Learner Progress:  Not documented in this visit.                            PT Recommendation and Plan       Plan of Care Reviewed With: patient  Progress: improving  Outcome Evaluation: Pt. limited with mobility today due to N/V with lethargy this a.m. & lethargy, but no N/V this p.m. Pt. performed very limited seated therex this a.m., Depx1 for w/c mobility to room, transferred st. piv with RW MinAx2 for safety, pt. ModA for sit-sup, pt. ModAx1 for sup-sit this p.m. pt. SBA for sitting EOB, pt. stood Dominga from EOB with bed elevated to use urinal, pt required assist for performance, pt. transferred Dominga bed-w/c with RW, pt. performed x15-20 reps seated therex, pt. VSS with each PT treatment, pt. left sitting up w/c with  all needs within reach this p.m.       Outcome Measures     Row Name 06/01/23 0758 05/31/23 1251 05/31/23 0801       10 Meter Walk Test Self-Selected Velocity    Self-Selected Velocity: Trial 1 -- -- 40 sec.  -LR    Self-Selected Velocity: Trial 2 -- -- 40 sec.  -LR    Self-Selected Velocity: Trial 3 -- -- 40 sec.  -LR    Self-Selected Velocity: Average Time -- -- 40 sec.  -LR       10 Meter Walk Test Actual Velocity    Actual Self-Selected Velocity -- -- 0.15 m/s  -LR       9 Hole Peg    9-Hole Peg Left -- 45.88  -RW --    9-Hole Peg Right -- 51.2  -RW --       How much help from another person do you currently need...    Turning from your back to your side while in flat bed without using bedrails? 3  -LR -- 2  -LR    Moving from lying on back to sitting on the side of a flat bed without bedrails? 3  -LR -- 2  -LR    Moving to and from a bed to a chair (including a wheelchair)? 3  -LR -- 2  -LR    Standing up from a chair using your arms (e.g., wheelchair, bedside chair)? 3  -LR -- 2  -LR    Climbing 3-5 steps with a railing? 3  -LR -- 3  -LR    To walk in hospital room? 3  -LR -- 1  -LR    AM-PAC 6 Clicks Score (PT) 18  -LR -- 12  -LR       How much help from another is currently needed...    Putting on and taking off regular lower body clothing? -- 2  -RW --    Bathing (including washing, rinsing, and drying) -- 2  -RW --    Toileting (which includes using toilet bed pan or urinal) -- 2  -RW --    Putting on and taking off regular upper body clothing -- 3  -RW --    Taking care of personal grooming (such as brushing teeth) -- 4  -RW --    Eating meals -- 4  -RW --    AM-PAC 6 Clicks Score (OT) -- 17  -RW --       Functional Assessment    Outcome Measure Options -- AM-PAC 6 Clicks Daily Activity (OT);9 Hole Peg  -RW 10 Meter Walk;AM-PAC 6 Clicks Basic Mobility (PT)  -LR          User Key  (r) = Recorded By, (t) = Taken By, (c) = Cosigned By    Initials Name Provider Type    Phong Peña Physical Therapist     RW Adelita Castellon, OT Occupational Therapist                     Time Calculation:      PT Charges     Row Name 06/02/23 1442 06/02/23 1328          Time Calculation    Start Time 1335  -JA 1125  -JA     Stop Time 1425  -JA 1205  -JA     Time Calculation (min) 50 min  -JA 40 min  -JA        Time Calculation- PT    Total Timed Code Minutes- PT 50 minute(s)  -JA 40 minute(s)  -JA        Timed Charges    87645 - PT Therapeutic Exercise Minutes 30  -JA --     66610 - PT Therapeutic Activity Minutes 20  -JA 40  -JA        Total Minutes    Timed Charges Total Minutes 50  -JA 40  -JA      Total Minutes 50  -JA 40  -JA           User Key  (r) = Recorded By, (t) = Taken By, (c) = Cosigned By    Initials Name Provider Type    Jp Jc PTA Physical Therapist Assistant                Therapy Charges for Today     Code Description Service Date Service Provider Modifiers Qty    07900744620 HC PT THERAPEUTIC ACT EA 15 MIN 6/2/2023 Jp Abarca, PTA GP 3    31121732228 HC PT THER PROC EA 15 MIN 6/2/2023 Jp Abarca PTA GP 2    85649174258 HC PT THERAPEUTIC ACT EA 15 MIN 6/2/2023 Jp Abarca, PTA GP 1            PT G-Codes  Outcome Measure Options: AM-PAC 6 Clicks Daily Activity (OT), 9 Hole Peg  AM-PAC 6 Clicks Score (PT): 18  AM-PAC 6 Clicks Score (OT): 17      Jp Abarca PTA  6/2/2023

## 2023-06-02 NOTE — PLAN OF CARE
Goal Outcome Evaluation:  Plan of Care Reviewed With: patient        Progress: improving  Outcome Evaluation: Pt. limited with mobility today due to N/V with lethargy this a.m. & lethargy, but no N/V this p.m. Pt. performed very limited seated therex this a.m., Depx1 for w/c mobility to room, transferred [facility] with RW MinAx2 for safety, pt. ModA for sit-sup, pt. ModAx1 for sup-sit this p.m. pt. SBA for sitting EOB, pt. stood Dominga from EOB with bed elevated to use urinal, pt required assist for performance, pt. transferred Dominga bed-w/c with RW, pt. performed x15-20 reps seated therex, pt. VSS with each PT treatment, pt. left sitting up w/c with all needs within reach this p.m.

## 2023-06-02 NOTE — PROGRESS NOTES
Progress Note      Admit date: 5/30/2023  2:34 PM       Treatment Team     Provider Relationship Specialty Contact    Javi Alfaro MD Attending Orthopedic Surgery   [phone]      Jp Abarca PTA Physical Therapy Assistant Physical Therapy --    Kim Fernandez COTA Occupational Therapy Assistant Occupational Therapy --    Marco A Thompson DPM Consulting Physician Podiatry   [phone]      Tung Melissa RN Registered Nurse --   [phone]      Dick Valdes BSW  -- --    Marisol Uribe RN Registered Nurse -- --    Lucy Villanueva LPN Licensed Practical Nurse -- --             Chief Complaint:  Medically complex patient    HPI: No changes since admission    Vital Signs  Temp:  [97.8 °F (36.6 °C)-99.9 °F (37.7 °C)] 99.9 °F (37.7 °C)  Heart Rate:  [] 99  Resp:  [18] 18  BP: (119-136)/(64-76) 119/64    Physical Exam 1:    Constitutional: Alert and stable     HENT:      Eyes:     Neck:      Cardiovascular: No chest pain    Pulmonary:      Abdominal:      Genitourinary/Anorectal:       Musculoskeletal: No evidence of DVT wound being managed by wound care    Skin:     Neurological:      Psychiatric/Behavioral: Cooperative physical therapy       Results Review:    I reviewed the patient's new clinical results.    Nursing notes and therapy notes have been reviewed.    I have reviewed medications.    Assessment/Plan  Principal Problem:    Medically complex patient indication for admission because of this following problems of the neuropathy resulting in a Charcot foot recall resulting in necessary surgery and then cardiac issues requiring the patient to be prolonged hospitalization and LTAC respiratory support is now stable enough to be mobilized in order to go home which we are providing this acute rehab  Active Problems: All other conditions under evaluation and are stable type 2 diabetes mellitus with skin complication    Charcot's joint of left foot    Neuropathy due to  secondary diabetes mellitus    Atrial fibrillation    Presence of biventricular cardiac pacemaker    Hypertension           Javi Alfaro MD  06/02/23  10:18 CDT          This document has been electronically signed by Javi Alfaro MD on June 2, 2023 10:18 CDT      Part of this note may be an electronic transcription/translation of spoken language to printed text using the Dragon Dictation System.

## 2023-06-02 NOTE — NURSING NOTE
Pt became lethargic in gym during PT. Pt returned to his room and vomited small amount of emesis. Pt is orientedx4, VS WNL,  equal. Pt given zofran for nausea and returned to bed with HOB elevated. Will continue to monitor.

## 2023-06-03 ENCOUNTER — HOSPITAL ENCOUNTER (INPATIENT)
Facility: HOSPITAL | Age: 63
LOS: 3 days | Discharge: NURSING, REHAB OR LONG-TERM CARE | End: 2023-06-08
Attending: STUDENT IN AN ORGANIZED HEALTH CARE EDUCATION/TRAINING PROGRAM | Admitting: INTERNAL MEDICINE
Payer: MEDICARE

## 2023-06-03 ENCOUNTER — APPOINTMENT (OUTPATIENT)
Dept: GENERAL RADIOLOGY | Facility: HOSPITAL | Age: 63
End: 2023-06-03
Payer: MEDICARE

## 2023-06-03 ENCOUNTER — APPOINTMENT (OUTPATIENT)
Dept: CT IMAGING | Facility: HOSPITAL | Age: 63
End: 2023-06-03
Payer: MEDICARE

## 2023-06-03 ENCOUNTER — APPOINTMENT (OUTPATIENT)
Dept: CARDIOLOGY | Facility: HOSPITAL | Age: 63
End: 2023-06-03
Payer: MEDICARE

## 2023-06-03 VITALS
WEIGHT: 315 LBS | SYSTOLIC BLOOD PRESSURE: 96 MMHG | RESPIRATION RATE: 20 BRPM | DIASTOLIC BLOOD PRESSURE: 50 MMHG | HEIGHT: 76 IN | HEART RATE: 91 BPM | TEMPERATURE: 100.6 F | BODY MASS INDEX: 38.36 KG/M2 | OXYGEN SATURATION: 92 %

## 2023-06-03 DIAGNOSIS — R50.9 FEVER OF UNKNOWN ORIGIN: Primary | ICD-10-CM

## 2023-06-03 DIAGNOSIS — I95.89 OTHER SPECIFIED HYPOTENSION: ICD-10-CM

## 2023-06-03 DIAGNOSIS — Z74.09 IMPAIRED MOBILITY AND ADLS: ICD-10-CM

## 2023-06-03 DIAGNOSIS — Z78.9 IMPAIRED MOBILITY AND ADLS: ICD-10-CM

## 2023-06-03 DIAGNOSIS — Z74.09 IMPAIRED FUNCTIONAL MOBILITY, BALANCE, GAIT, AND ENDURANCE: ICD-10-CM

## 2023-06-03 LAB
ABO GROUP BLD: NORMAL
ALBUMIN SERPL-MCNC: 3.7 G/DL (ref 3.5–5.2)
ALBUMIN/GLOB SERPL: 1.2 G/DL
ALP SERPL-CCNC: 100 U/L (ref 39–117)
ALT SERPL W P-5'-P-CCNC: 18 U/L (ref 1–41)
ANION GAP SERPL CALCULATED.3IONS-SCNC: 11 MMOL/L (ref 5–15)
AST SERPL-CCNC: 25 U/L (ref 1–40)
BACTERIA BLD CULT: ABNORMAL
BACTERIA BLD CULT: ABNORMAL
BASOPHILS # BLD AUTO: 0.02 10*3/MM3 (ref 0–0.2)
BASOPHILS NFR BLD AUTO: 0.4 % (ref 0–1.5)
BH CV ECHO MEAS - ACS: 1.75 CM
BH CV ECHO MEAS - AO MAX PG: 14.7 MMHG
BH CV ECHO MEAS - AO MEAN PG: 8 MMHG
BH CV ECHO MEAS - AO ROOT DIAM: 3.7 CM
BH CV ECHO MEAS - AO V2 MAX: 192 CM/SEC
BH CV ECHO MEAS - AO V2 VTI: 30 CM
BH CV ECHO MEAS - AVA(I,D): 2.17 CM2
BH CV ECHO MEAS - EDV(CUBED): 246 ML
BH CV ECHO MEAS - ESV(CUBED): 126.7 ML
BH CV ECHO MEAS - FS: 19.8 %
BH CV ECHO MEAS - IVS/LVPW: 1.04 CM
BH CV ECHO MEAS - IVSD: 1.43 CM
BH CV ECHO MEAS - LA DIMENSION: 4.8 CM
BH CV ECHO MEAS - LV MASS(C)D: 416.2 GRAMS
BH CV ECHO MEAS - LV MAX PG: 4.7 MMHG
BH CV ECHO MEAS - LV MEAN PG: 2 MMHG
BH CV ECHO MEAS - LV V1 MAX: 108 CM/SEC
BH CV ECHO MEAS - LV V1 VTI: 19 CM
BH CV ECHO MEAS - LVIDD: 6.3 CM
BH CV ECHO MEAS - LVIDS: 5 CM
BH CV ECHO MEAS - LVOT AREA: 3.4 CM2
BH CV ECHO MEAS - LVOT DIAM: 2.09 CM
BH CV ECHO MEAS - LVPWD: 1.37 CM
BH CV ECHO MEAS - MR MAX PG: 108.2 MMHG
BH CV ECHO MEAS - MR MAX VEL: 520.1 CM/SEC
BH CV ECHO MEAS - SV(LVOT): 65.1 ML
BILIRUB SERPL-MCNC: 0.8 MG/DL (ref 0–1.2)
BILIRUB UR QL STRIP: NEGATIVE
BLD GP AB SCN SERPL QL: NEGATIVE
BOTTLE TYPE: ABNORMAL
BUN SERPL-MCNC: 24 MG/DL (ref 8–23)
BUN/CREAT SERPL: 19.5 (ref 7–25)
CALCIUM SPEC-SCNC: 8.3 MG/DL (ref 8.6–10.5)
CHLORIDE SERPL-SCNC: 97 MMOL/L (ref 98–107)
CLARITY UR: CLEAR
CO2 SERPL-SCNC: 25 MMOL/L (ref 22–29)
COLOR UR: YELLOW
CREAT SERPL-MCNC: 1.23 MG/DL (ref 0.76–1.27)
D-LACTATE SERPL-SCNC: 1.1 MMOL/L (ref 0.5–2)
DEPRECATED RDW RBC AUTO: 48.2 FL (ref 37–54)
EGFRCR SERPLBLD CKD-EPI 2021: 66.8 ML/MIN/1.73
EOSINOPHIL # BLD AUTO: 0.01 10*3/MM3 (ref 0–0.4)
EOSINOPHIL NFR BLD AUTO: 0.2 % (ref 0.3–6.2)
ERYTHROCYTE [DISTWIDTH] IN BLOOD BY AUTOMATED COUNT: 15 % (ref 12.3–15.4)
FLUAV RNA RESP QL NAA+PROBE: NOT DETECTED
FLUBV RNA RESP QL NAA+PROBE: NOT DETECTED
GLOBULIN UR ELPH-MCNC: 3 GM/DL
GLUCOSE BLDC GLUCOMTR-MCNC: 123 MG/DL (ref 70–130)
GLUCOSE BLDC GLUCOMTR-MCNC: 131 MG/DL (ref 70–130)
GLUCOSE BLDC GLUCOMTR-MCNC: 139 MG/DL (ref 70–130)
GLUCOSE BLDC GLUCOMTR-MCNC: 142 MG/DL (ref 70–130)
GLUCOSE BLDC GLUCOMTR-MCNC: 182 MG/DL (ref 70–130)
GLUCOSE BLDC GLUCOMTR-MCNC: 217 MG/DL (ref 70–130)
GLUCOSE SERPL-MCNC: 120 MG/DL (ref 65–99)
GLUCOSE UR STRIP-MCNC: NEGATIVE MG/DL
HCT VFR BLD AUTO: 32.2 % (ref 37.5–51)
HGB BLD-MCNC: 10.1 G/DL (ref 13–17.7)
HGB UR QL STRIP.AUTO: NEGATIVE
HOLD SPECIMEN: NORMAL
HOLD SPECIMEN: NORMAL
IMM GRANULOCYTES # BLD AUTO: 0.02 10*3/MM3 (ref 0–0.05)
IMM GRANULOCYTES NFR BLD AUTO: 0.4 % (ref 0–0.5)
KETONES UR QL STRIP: NEGATIVE
LEUKOCYTE ESTERASE UR QL STRIP.AUTO: NEGATIVE
LYMPHOCYTES # BLD AUTO: 0.83 10*3/MM3 (ref 0.7–3.1)
LYMPHOCYTES NFR BLD AUTO: 17.3 % (ref 19.6–45.3)
Lab: NORMAL
MAGNESIUM SERPL-MCNC: 2.1 MG/DL (ref 1.6–2.4)
MAGNESIUM SERPL-MCNC: 2.1 MG/DL (ref 1.6–2.4)
MCH RBC QN AUTO: 27.5 PG (ref 26.6–33)
MCHC RBC AUTO-ENTMCNC: 31.4 G/DL (ref 31.5–35.7)
MCV RBC AUTO: 87.7 FL (ref 79–97)
MONOCYTES # BLD AUTO: 0.5 10*3/MM3 (ref 0.1–0.9)
MONOCYTES NFR BLD AUTO: 10.4 % (ref 5–12)
NEUTROPHILS NFR BLD AUTO: 3.43 10*3/MM3 (ref 1.7–7)
NEUTROPHILS NFR BLD AUTO: 71.3 % (ref 42.7–76)
NITRITE UR QL STRIP: NEGATIVE
NRBC BLD AUTO-RTO: 0 /100 WBC (ref 0–0.2)
NT-PROBNP SERPL-MCNC: 2701 PG/ML (ref 0–900)
PH UR STRIP.AUTO: <=5 [PH] (ref 5–9)
PLATELET # BLD AUTO: 131 10*3/MM3 (ref 140–450)
PMV BLD AUTO: 9.9 FL (ref 6–12)
POTASSIUM SERPL-SCNC: 4 MMOL/L (ref 3.5–5.2)
PROT SERPL-MCNC: 6.7 G/DL (ref 6–8.5)
PROT UR QL STRIP: ABNORMAL
QT INTERVAL: 406 MS
QTC INTERVAL: 485 MS
RBC # BLD AUTO: 3.67 10*6/MM3 (ref 4.14–5.8)
RH BLD: POSITIVE
SARS-COV-2 RNA RESP QL NAA+PROBE: NOT DETECTED
SODIUM SERPL-SCNC: 133 MMOL/L (ref 136–145)
SP GR UR STRIP: 1.05 (ref 1–1.03)
T&S EXPIRATION DATE: NORMAL
TROPONIN T SERPL HS-MCNC: 43 NG/L
TSH SERPL DL<=0.05 MIU/L-ACNC: 0.47 UIU/ML (ref 0.27–4.2)
UROBILINOGEN UR QL STRIP: ABNORMAL
WBC NRBC COR # BLD: 4.81 10*3/MM3 (ref 3.4–10.8)
WHOLE BLOOD HOLD COAG: NORMAL
WHOLE BLOOD HOLD SPECIMEN: NORMAL

## 2023-06-03 PROCEDURE — 82948 REAGENT STRIP/BLOOD GLUCOSE: CPT

## 2023-06-03 PROCEDURE — 25010000002 ONDANSETRON PER 1 MG: Performed by: HOSPITALIST

## 2023-06-03 PROCEDURE — 85025 COMPLETE CBC W/AUTO DIFF WBC: CPT | Performed by: STUDENT IN AN ORGANIZED HEALTH CARE EDUCATION/TRAINING PROGRAM

## 2023-06-03 PROCEDURE — 93308 TTE F-UP OR LMTD: CPT | Performed by: INTERNAL MEDICINE

## 2023-06-03 PROCEDURE — 71045 X-RAY EXAM CHEST 1 VIEW: CPT

## 2023-06-03 PROCEDURE — 83880 ASSAY OF NATRIURETIC PEPTIDE: CPT | Performed by: STUDENT IN AN ORGANIZED HEALTH CARE EDUCATION/TRAINING PROGRAM

## 2023-06-03 PROCEDURE — 93308 TTE F-UP OR LMTD: CPT

## 2023-06-03 PROCEDURE — 83605 ASSAY OF LACTIC ACID: CPT | Performed by: STUDENT IN AN ORGANIZED HEALTH CARE EDUCATION/TRAINING PROGRAM

## 2023-06-03 PROCEDURE — 93325 DOPPLER ECHO COLOR FLOW MAPG: CPT | Performed by: INTERNAL MEDICINE

## 2023-06-03 PROCEDURE — 71275 CT ANGIOGRAPHY CHEST: CPT

## 2023-06-03 PROCEDURE — 81003 URINALYSIS AUTO W/O SCOPE: CPT | Performed by: STUDENT IN AN ORGANIZED HEALTH CARE EDUCATION/TRAINING PROGRAM

## 2023-06-03 PROCEDURE — 87040 BLOOD CULTURE FOR BACTERIA: CPT | Performed by: STUDENT IN AN ORGANIZED HEALTH CARE EDUCATION/TRAINING PROGRAM

## 2023-06-03 PROCEDURE — 99285 EMERGENCY DEPT VISIT HI MDM: CPT

## 2023-06-03 PROCEDURE — 93325 DOPPLER ECHO COLOR FLOW MAPG: CPT

## 2023-06-03 PROCEDURE — 97535 SELF CARE MNGMENT TRAINING: CPT

## 2023-06-03 PROCEDURE — 86901 BLOOD TYPING SEROLOGIC RH(D): CPT | Performed by: STUDENT IN AN ORGANIZED HEALTH CARE EDUCATION/TRAINING PROGRAM

## 2023-06-03 PROCEDURE — G0378 HOSPITAL OBSERVATION PER HR: HCPCS

## 2023-06-03 PROCEDURE — 93005 ELECTROCARDIOGRAM TRACING: CPT | Performed by: STUDENT IN AN ORGANIZED HEALTH CARE EDUCATION/TRAINING PROGRAM

## 2023-06-03 PROCEDURE — 86901 BLOOD TYPING SEROLOGIC RH(D): CPT

## 2023-06-03 PROCEDURE — 97168 OT RE-EVAL EST PLAN CARE: CPT

## 2023-06-03 PROCEDURE — 86900 BLOOD TYPING SEROLOGIC ABO: CPT

## 2023-06-03 PROCEDURE — 25510000001 IOPAMIDOL PER 1 ML: Performed by: STUDENT IN AN ORGANIZED HEALTH CARE EDUCATION/TRAINING PROGRAM

## 2023-06-03 PROCEDURE — 83735 ASSAY OF MAGNESIUM: CPT | Performed by: STUDENT IN AN ORGANIZED HEALTH CARE EDUCATION/TRAINING PROGRAM

## 2023-06-03 PROCEDURE — 84484 ASSAY OF TROPONIN QUANT: CPT | Performed by: STUDENT IN AN ORGANIZED HEALTH CARE EDUCATION/TRAINING PROGRAM

## 2023-06-03 PROCEDURE — 86900 BLOOD TYPING SEROLOGIC ABO: CPT | Performed by: STUDENT IN AN ORGANIZED HEALTH CARE EDUCATION/TRAINING PROGRAM

## 2023-06-03 PROCEDURE — 93321 DOPPLER ECHO F-UP/LMTD STD: CPT | Performed by: INTERNAL MEDICINE

## 2023-06-03 PROCEDURE — 25010000002 FUROSEMIDE PER 20 MG: Performed by: HOSPITALIST

## 2023-06-03 PROCEDURE — 36415 COLL VENOUS BLD VENIPUNCTURE: CPT | Performed by: STUDENT IN AN ORGANIZED HEALTH CARE EDUCATION/TRAINING PROGRAM

## 2023-06-03 PROCEDURE — 25010000002 PIPERACILLIN SOD-TAZOBACTAM PER 1 G: Performed by: FAMILY MEDICINE

## 2023-06-03 PROCEDURE — 25510000001 PERFLUTREN (DEFINITY) 8.476 MG IN SODIUM CHLORIDE (PF) 0.9 % 10 ML INJECTION: Performed by: HOSPITALIST

## 2023-06-03 PROCEDURE — 93321 DOPPLER ECHO F-UP/LMTD STD: CPT

## 2023-06-03 PROCEDURE — 87150 DNA/RNA AMPLIFIED PROBE: CPT | Performed by: STUDENT IN AN ORGANIZED HEALTH CARE EDUCATION/TRAINING PROGRAM

## 2023-06-03 PROCEDURE — 25010000002 PIPERACILLIN SOD-TAZOBACTAM PER 1 G: Performed by: INTERNAL MEDICINE

## 2023-06-03 PROCEDURE — 83735 ASSAY OF MAGNESIUM: CPT | Performed by: HOSPITALIST

## 2023-06-03 PROCEDURE — 87636 SARSCOV2 & INF A&B AMP PRB: CPT | Performed by: STUDENT IN AN ORGANIZED HEALTH CARE EDUCATION/TRAINING PROGRAM

## 2023-06-03 PROCEDURE — 36415 COLL VENOUS BLD VENIPUNCTURE: CPT

## 2023-06-03 PROCEDURE — 86850 RBC ANTIBODY SCREEN: CPT | Performed by: STUDENT IN AN ORGANIZED HEALTH CARE EDUCATION/TRAINING PROGRAM

## 2023-06-03 PROCEDURE — 80053 COMPREHEN METABOLIC PANEL: CPT | Performed by: STUDENT IN AN ORGANIZED HEALTH CARE EDUCATION/TRAINING PROGRAM

## 2023-06-03 PROCEDURE — 84443 ASSAY THYROID STIM HORMONE: CPT | Performed by: HOSPITALIST

## 2023-06-03 PROCEDURE — 93010 ELECTROCARDIOGRAM REPORT: CPT | Performed by: INTERNAL MEDICINE

## 2023-06-03 RX ORDER — ACETAMINOPHEN 325 MG/1
650 TABLET ORAL EVERY 4 HOURS PRN
Status: DISCONTINUED | OUTPATIENT
Start: 2023-06-03 | End: 2023-06-03 | Stop reason: HOSPADM

## 2023-06-03 RX ORDER — AMOXICILLIN 250 MG
2 CAPSULE ORAL 2 TIMES DAILY
Status: DISCONTINUED | OUTPATIENT
Start: 2023-06-03 | End: 2023-06-08 | Stop reason: HOSPADM

## 2023-06-03 RX ORDER — POLYETHYLENE GLYCOL 3350 17 G/17G
17 POWDER, FOR SOLUTION ORAL DAILY PRN
Status: DISCONTINUED | OUTPATIENT
Start: 2023-06-03 | End: 2023-06-08 | Stop reason: HOSPADM

## 2023-06-03 RX ORDER — ACETAMINOPHEN 325 MG/1
650 TABLET ORAL EVERY 4 HOURS PRN
Start: 2023-06-03

## 2023-06-03 RX ORDER — ACETAMINOPHEN 325 MG/1
650 TABLET ORAL EVERY 4 HOURS PRN
Status: DISCONTINUED | OUTPATIENT
Start: 2023-06-03 | End: 2023-06-03 | Stop reason: SDUPTHER

## 2023-06-03 RX ORDER — BISACODYL 10 MG
10 SUPPOSITORY, RECTAL RECTAL DAILY PRN
Status: DISCONTINUED | OUTPATIENT
Start: 2023-06-03 | End: 2023-06-08 | Stop reason: HOSPADM

## 2023-06-03 RX ORDER — SODIUM CHLORIDE 9 MG/ML
40 INJECTION, SOLUTION INTRAVENOUS AS NEEDED
Status: DISCONTINUED | OUTPATIENT
Start: 2023-06-03 | End: 2023-06-08 | Stop reason: HOSPADM

## 2023-06-03 RX ORDER — LORAZEPAM 0.5 MG/1
0.5 TABLET ORAL EVERY 8 HOURS PRN
Status: DISCONTINUED | OUTPATIENT
Start: 2023-06-03 | End: 2023-06-08 | Stop reason: HOSPADM

## 2023-06-03 RX ORDER — NITROGLYCERIN 0.4 MG/1
0.4 TABLET SUBLINGUAL
Status: DISCONTINUED | OUTPATIENT
Start: 2023-06-03 | End: 2023-06-08 | Stop reason: HOSPADM

## 2023-06-03 RX ORDER — TRAZODONE HYDROCHLORIDE 50 MG/1
50 TABLET ORAL NIGHTLY PRN
Start: 2023-06-03

## 2023-06-03 RX ORDER — CALCIUM CARBONATE 500 MG/1
2 TABLET, CHEWABLE ORAL 2 TIMES DAILY PRN
Start: 2023-06-03

## 2023-06-03 RX ORDER — MELATONIN
5000 NIGHTLY
Status: DISCONTINUED | OUTPATIENT
Start: 2023-06-03 | End: 2023-06-08 | Stop reason: HOSPADM

## 2023-06-03 RX ORDER — LOSARTAN POTASSIUM 25 MG/1
25 TABLET ORAL DAILY
Status: DISCONTINUED | OUTPATIENT
Start: 2023-06-03 | End: 2023-06-08 | Stop reason: HOSPADM

## 2023-06-03 RX ORDER — ONDANSETRON 4 MG/1
4 TABLET, FILM COATED ORAL EVERY 6 HOURS PRN
Status: DISCONTINUED | OUTPATIENT
Start: 2023-06-03 | End: 2023-06-08 | Stop reason: HOSPADM

## 2023-06-03 RX ORDER — SODIUM CHLORIDE 0.9 % (FLUSH) 0.9 %
10 SYRINGE (ML) INJECTION EVERY 12 HOURS SCHEDULED
Status: DISCONTINUED | OUTPATIENT
Start: 2023-06-03 | End: 2023-06-08 | Stop reason: HOSPADM

## 2023-06-03 RX ORDER — SODIUM CHLORIDE 0.9 % (FLUSH) 0.9 %
10 SYRINGE (ML) INJECTION AS NEEDED
Status: DISCONTINUED | OUTPATIENT
Start: 2023-06-03 | End: 2023-06-08 | Stop reason: HOSPADM

## 2023-06-03 RX ORDER — METOPROLOL SUCCINATE 25 MG/1
25 TABLET, EXTENDED RELEASE ORAL 2 TIMES DAILY
Status: DISCONTINUED | OUTPATIENT
Start: 2023-06-03 | End: 2023-06-03

## 2023-06-03 RX ORDER — DEXTROSE MONOHYDRATE 25 G/50ML
25 INJECTION, SOLUTION INTRAVENOUS
Status: DISCONTINUED | OUTPATIENT
Start: 2023-06-03 | End: 2023-06-08 | Stop reason: HOSPADM

## 2023-06-03 RX ORDER — SIMETHICONE 80 MG
80 TABLET,CHEWABLE ORAL EVERY 4 HOURS PRN
Start: 2023-06-03

## 2023-06-03 RX ORDER — ONDANSETRON 4 MG/1
4 TABLET, FILM COATED ORAL EVERY 6 HOURS PRN
Start: 2023-06-03

## 2023-06-03 RX ORDER — BISACODYL 10 MG
10 SUPPOSITORY, RECTAL RECTAL DAILY PRN
Start: 2023-06-03

## 2023-06-03 RX ORDER — ACETAMINOPHEN 325 MG/1
650 TABLET ORAL EVERY 6 HOURS PRN
Status: DISCONTINUED | OUTPATIENT
Start: 2023-06-03 | End: 2023-06-08 | Stop reason: HOSPADM

## 2023-06-03 RX ORDER — OXYCODONE AND ACETAMINOPHEN 7.5; 325 MG/1; MG/1
1 TABLET ORAL EVERY 4 HOURS PRN
Refills: 0 | OUTPATIENT
Start: 2023-06-03 | End: 2023-06-05

## 2023-06-03 RX ORDER — CALCIUM CARBONATE 500 MG/1
2 TABLET, CHEWABLE ORAL 2 TIMES DAILY PRN
Status: DISCONTINUED | OUTPATIENT
Start: 2023-06-03 | End: 2023-06-08 | Stop reason: HOSPADM

## 2023-06-03 RX ORDER — TAMSULOSIN HYDROCHLORIDE 0.4 MG/1
0.4 CAPSULE ORAL 2 TIMES DAILY
Qty: 30 CAPSULE
Start: 2023-06-03

## 2023-06-03 RX ORDER — ONDANSETRON 2 MG/ML
4 INJECTION INTRAMUSCULAR; INTRAVENOUS EVERY 6 HOURS PRN
Status: DISCONTINUED | OUTPATIENT
Start: 2023-06-03 | End: 2023-06-08 | Stop reason: HOSPADM

## 2023-06-03 RX ORDER — INSULIN ASPART 100 [IU]/ML
2-12 INJECTION, SOLUTION INTRAVENOUS; SUBCUTANEOUS
Refills: 12
Start: 2023-06-03

## 2023-06-03 RX ORDER — INSULIN ASPART 100 [IU]/ML
2-10 INJECTION, SOLUTION INTRAVENOUS; SUBCUTANEOUS
Refills: 12
Start: 2023-06-03

## 2023-06-03 RX ORDER — DIPHENOXYLATE HYDROCHLORIDE AND ATROPINE SULFATE 2.5; .025 MG/1; MG/1
1 TABLET ORAL NIGHTLY
Status: DISCONTINUED | OUTPATIENT
Start: 2023-06-03 | End: 2023-06-08 | Stop reason: HOSPADM

## 2023-06-03 RX ORDER — BISACODYL 5 MG/1
5 TABLET, DELAYED RELEASE ORAL DAILY PRN
Status: DISCONTINUED | OUTPATIENT
Start: 2023-06-03 | End: 2023-06-08 | Stop reason: HOSPADM

## 2023-06-03 RX ORDER — HYDROCODONE BITARTRATE AND ACETAMINOPHEN 5; 325 MG/1; MG/1
1 TABLET ORAL EVERY 4 HOURS PRN
Status: DISCONTINUED | OUTPATIENT
Start: 2023-06-03 | End: 2023-06-08 | Stop reason: HOSPADM

## 2023-06-03 RX ORDER — SODIUM BICARBONATE 650 MG/1
650 TABLET ORAL 3 TIMES DAILY
Start: 2023-06-03

## 2023-06-03 RX ORDER — PSEUDOEPHEDRINE HCL 30 MG
100 TABLET ORAL 2 TIMES DAILY
Start: 2023-06-03

## 2023-06-03 RX ORDER — ASCORBIC ACID 500 MG
1000 TABLET ORAL NIGHTLY
Status: DISCONTINUED | OUTPATIENT
Start: 2023-06-03 | End: 2023-06-08 | Stop reason: HOSPADM

## 2023-06-03 RX ORDER — NICOTINE POLACRILEX 4 MG
15 LOZENGE BUCCAL
Status: DISCONTINUED | OUTPATIENT
Start: 2023-06-03 | End: 2023-06-08 | Stop reason: HOSPADM

## 2023-06-03 RX ORDER — FUROSEMIDE 10 MG/ML
40 INJECTION INTRAMUSCULAR; INTRAVENOUS DAILY
Status: DISCONTINUED | OUTPATIENT
Start: 2023-06-03 | End: 2023-06-08 | Stop reason: HOSPADM

## 2023-06-03 RX ORDER — INSULIN ASPART 100 [IU]/ML
0-14 INJECTION, SOLUTION INTRAVENOUS; SUBCUTANEOUS
Status: DISCONTINUED | OUTPATIENT
Start: 2023-06-03 | End: 2023-06-08 | Stop reason: HOSPADM

## 2023-06-03 RX ADMIN — PERFLUTREN 1.5 ML: 6.52 INJECTION, SUSPENSION INTRAVENOUS at 15:09

## 2023-06-03 RX ADMIN — DOCUSATE SODIUM 50 MG AND SENNOSIDES 8.6 MG 2 TABLET: 8.6; 5 TABLET, FILM COATED ORAL at 10:57

## 2023-06-03 RX ADMIN — ONDANSETRON 4 MG: 2 INJECTION INTRAMUSCULAR; INTRAVENOUS at 19:27

## 2023-06-03 RX ADMIN — DRONEDARONE 400 MG: 400 TABLET, FILM COATED ORAL at 20:34

## 2023-06-03 RX ADMIN — ACETAMINOPHEN 650 MG: 325 TABLET, FILM COATED ORAL at 00:33

## 2023-06-03 RX ADMIN — Medication 10 ML: at 20:34

## 2023-06-03 RX ADMIN — BISACODYL 10 MG: 5 TABLET, COATED ORAL at 00:35

## 2023-06-03 RX ADMIN — ACETAMINOPHEN 650 MG: 325 TABLET, FILM COATED ORAL at 10:57

## 2023-06-03 RX ADMIN — PIPERACILLIN SODIUM AND TAZOBACTAM SODIUM 3.38 G: 3; .375 INJECTION, POWDER, LYOPHILIZED, FOR SOLUTION INTRAVENOUS at 08:04

## 2023-06-03 RX ADMIN — APIXABAN 5 MG: 5 TABLET, FILM COATED ORAL at 20:34

## 2023-06-03 RX ADMIN — Medication 400 MG: at 10:57

## 2023-06-03 RX ADMIN — FUROSEMIDE 40 MG: 10 INJECTION, SOLUTION INTRAVENOUS at 10:57

## 2023-06-03 RX ADMIN — IOPAMIDOL 90 ML: 755 INJECTION, SOLUTION INTRAVENOUS at 05:48

## 2023-06-03 RX ADMIN — DOCUSATE SODIUM 50 MG AND SENNOSIDES 8.6 MG 2 TABLET: 8.6; 5 TABLET, FILM COATED ORAL at 20:34

## 2023-06-03 RX ADMIN — THERA TABS 1 TABLET: TAB at 20:34

## 2023-06-03 RX ADMIN — Medication 10 ML: at 11:10

## 2023-06-03 RX ADMIN — Medication 400 MG: at 20:34

## 2023-06-03 RX ADMIN — APIXABAN 5 MG: 5 TABLET, FILM COATED ORAL at 10:57

## 2023-06-03 RX ADMIN — METOPROLOL TARTRATE 25 MG: 25 TABLET, FILM COATED ORAL at 20:34

## 2023-06-03 RX ADMIN — SODIUM CHLORIDE 1000 ML: 9 INJECTION, SOLUTION INTRAVENOUS at 05:20

## 2023-06-03 RX ADMIN — OXYCODONE HYDROCHLORIDE AND ACETAMINOPHEN 1000 MG: 500 TABLET ORAL at 20:34

## 2023-06-03 RX ADMIN — PIPERACILLIN AND TAZOBACTAM 4.5 G: 4; .5 INJECTION, POWDER, LYOPHILIZED, FOR SOLUTION INTRAVENOUS; PARENTERAL at 22:35

## 2023-06-03 RX ADMIN — ACETAMINOPHEN 650 MG: 325 TABLET, FILM COATED ORAL at 17:40

## 2023-06-03 RX ADMIN — Medication 5000 UNITS: at 20:34

## 2023-06-03 NOTE — PLAN OF CARE
Goal Outcome Evaluation:  New admission from emergency room. Patient oriented to room. Temp 100.7 . PRN tylenol given . No sign or symptoms of distress at this time.

## 2023-06-03 NOTE — PROGRESS NOTES
Told this evening that the patient had not had a bowel movement that was probably about 4:00 who ordered a suppository about 8:00 nurses called me to me patient had temperature 102.6 clear we did a sepsis work-up lab did not show any abnormality left lactic acid was normal white counts normal patient does not really feel ill.  He has been little confused today according to his wife lamination of his legs shows no evidence of an abscess or infection new Unna boot was applied and a splint was reapplied.  CT of the head shows no evidence of intracranial pathology other than calcification of his basilar artery but no masses or evidence of infection and no other space-occupying lesion indicate stroke or other problems.  And CT of the abdomen shows a lot of feces in the colon but but no evidence for diverticulitis infection gallbladder is normal plan is #1 observation Tylenol for temperature Zofran for nausea and vomiting basically keeping him this clear liquids Duphalac tablets 2 of them seem to get his bowels to move it for the bowel movement problem tomorrow we will do an enema to see if we get him bowels to work he does not have a history of gastroparesis or problems like that his abdomen physical exam obviously is rotund but no masses or no tenderness and he has no specific complaints other just I just do not feel well

## 2023-06-03 NOTE — H&P
[facility] Medicine  HISTORY AND PHYSICAL      Date of Admission: 6/3/2023  Primary Care Physician: Jamaal Bravo MD    Subjective     Chief Complaint:   fevers  History of Present Illness  The patient he is a 63-year-old male admitted to the hospital from rehab program within the hospital.  The patient was sent down here after being evaluated upstairs noted to have fevers.  In the ER patient had CT scan which was negative.  Urine analysis was negative Labs reviewed show white count of 8.1 H&H is 10.1 and 32.2 and the platelets are 131.  Initial troponin noted at 43.  BNP is 2701.  Sodium is 133 chloride 97 BUN is 24 creatinine 1.23 and GFR is 66.8.  The patient states she just has been feeling weak and tired.  Febrile.  The patient states he has not had a bowel movement for about 4 days.  On evaluation lungs are noted with crackles at the bases bilateral.  He is also noted to have normal active bowel sounds.  Nontender to palpation.  Left lower extremity noted with walking boot bandage in place.  The patient will be evaluated by wound care for continued care.  Chest x-ray is negative.  CT scan of the chest shows pulmonary emboli which appear to be improving.  Patient has had a history of recent PEs and is currently on anticoagulation for this.  EKG shows sinus rhythm 83 bpm.    Review of Systems   Constitutional:  Positive for chills, fatigue and fever.   HENT: Negative.     Eyes: Negative.    Respiratory:  Positive for shortness of breath. Negative for cough.    Cardiovascular: Negative.  Negative for chest pain and palpitations.   Gastrointestinal: Negative.  Negative for nausea and vomiting.   Endocrine: Negative.    Genitourinary: Negative.    Musculoskeletal: Negative.  Negative for myalgias.   Skin:  Positive for wound. Negative for rash.   Neurological:  Positive for weakness. Negative for dizziness.   Hematological: Negative.     Psychiatric/Behavioral: Negative.        Otherwise complete ROS reviewed and negative except as mentioned in the HPI.    Past Medical History:   Past Medical History:   Diagnosis Date    Arthritis     Atrial fibrillation     Diabetes mellitus     Diabetic foot ulcer associated with type 2 diabetes mellitus     left great toe    Hallux malleus of left foot     Hammer toe     Hypertension     MI (myocardial infarction)     Neuropathy in diabetes     Onychomycosis     Presence of biventricular cardiac pacemaker     Rheumatoid arthritis      Past Surgical History:  Past Surgical History:   Procedure Laterality Date    AMPUTATION DIGIT Right 03/05/2017    Procedure: RIGHT AMPUTATION TRANSMETATRASAL , RECESSION GASTROCNEMOUS;  Surgeon: Marco A Thompson DPM;  Location: [facility] OR;  Service:     ANKLE FUSION Left 04/13/2023    Procedure: REDUCTION OF LEFT ANKLE DEFORMITY WITH APPLICATION OF EXTERNAL FIXATOR;  Surgeon: Marco A Thompson DPM;  Location:  [facility] OR;  Service: Podiatry;  Laterality: Left;    CARDIAC CATHETERIZATION      CARDIAC DEFIBRILLATOR PLACEMENT  2010, 2016    CARPAL TUNNEL RELEASE  2015    elbow and wrist left arm    FOOT FUSION Left 05/15/2023    Procedure: REMOVAL OF EXTERNAL FIXATOR LEFT LOWER EXTREMITY WITH TIBIAL TALOCALCANEAL ARTHRODESIS  AND ALL INDICATED PROCEDURES;  Surgeon: Marco A Thompson DPM;  Location: [facility] OR;  Service: Podiatry;  Laterality: Left;    FOOT IRRIGATION, DEBRIDEMENT AND REPAIR Left 03/07/2018    Procedure: FOOT IRRIGATION, DEBRIDEMENT AND REPAIR;  Surgeon: Marco A Thompson DPM;  Location: [facility] OR;  Service:     FOOT IRRIGATION, DEBRIDEMENT AND REPAIR Left 03/10/2018    Procedure: FOOT IRRIGATION, DEBRIDEMENT AND REPAIR;  Surgeon: Marco A Thompson DPM;  Location: [facility] OR;  Service: Podiatry    FOOT WOUND CLOSURE Right 03/02/2017    Procedure: INCISION AND DRAINAGE, RIGHT FOOT;  Surgeon: Tung Rosenthal DPM;  Location: [facility] OR;  Service:     HAMMER TOE REPAIR Left  02/15/2018    Procedure: HAMMERTOE CORRECTION LEFT SECOND, THIRD AND FOURTH TOES AND HALLUX INTERPHALANGEAL JOINT ARTHRODESIS LEFT FOOT (Micro Asnis, 4.0 & 5.0 Asnis)      (c-arm);  Surgeon: Marco A Thompson DPM;  Location: [facility] OR;  Service:     HARDWARE REMOVAL Left 03/05/2018    Procedure: ANKLE/FOOT HARDWARE REMOVAL;  Surgeon: Marco A Thompson DPM;  Location: [facility] OR;  Service:     INCISION AND DRAINAGE LEG Left 03/05/2018    Procedure: INCISION AND DRAINAGE LOWER EXTREMITY;  Surgeon: Marco A Thompson DPM;  Location: [facility] OR;  Service:     PLANTAR FASCIA RELEASE Left 11/21/2019    Procedure: PLANTAR PLANING LEFT FOOT AND ALL OTHER INDICATED PROCEDURES;  Surgeon: Marco A Thompson DPM;  Location: [facility] OR;  Service: Podiatry    TOE AMPUTATION Right 2016    TONSILECTOMY, ADENOIDECTOMY, BILATERAL MYRINGOTOMY AND TUBES      age 25     Social History:  reports that he has never smoked. He has never used smokeless tobacco. He reports current alcohol use. He reports that he does not use drugs.    Family History: family history includes Diabetes in his father; Heart disease in an other family member; Hypertension in an other family member; No Known Problems in his daughter, daughter, maternal grandfather, maternal grandmother, paternal grandfather, paternal grandmother, and son; Stroke in his father.       Allergies:  Allergies   Allergen Reactions    Heparin Other (See Comments)     Heparin induce thrombocytopenia     Januvia [Sitagliptin] Hives    Lipitor [Atorvastatin] Other (See Comments)     Muscle Cramps...    Shellfish Allergy Swelling and Other (See Comments)     Age 13 this occurred doesn't remember what he ate swelled lips and face    Sulfa Antibiotics Rash     Pt was age of 4 when he was told by his family he had a reaction, pt is unsure        Medications:  Prior to Admission medications    Medication Sig Start Date End Date Taking? Authorizing Provider   ascorbic acid (VITAMIN C) 1000 MG tablet Take 1  tablet by mouth Every Night. 7/19/21   Herve Cr MD   Bacillus Coagulans-Inulin (Probiotic) 1-250 BILLION-MG capsule Take 1 capsule by mouth 2 (Two) Times a Day.  Patient not taking: Reported on 5/30/2023 1/27/23   Lito Rico APRN   Blood Glucose Monitoring Suppl (ONE TOUCH ULTRA 2) w/Device kit  10/18/21   Herve Cr MD   Cholecalciferol 125 MCG (5000 UT) tablet Take 1 tablet by mouth Every Night. 7/19/21   Herve Cr MD   clindamycin (CLEOCIN) 300 MG capsule Take 1 capsule by mouth 3 (Three) Times a Day.  Patient not taking: Reported on 5/30/2023 4/5/23   Lito Rico APRN   dronedarone (MULTAQ) 400 MG tablet Take 1 tablet by mouth Every Night.    Herve Cr MD   fexofenadine (ALLEGRA) 180 MG tablet Take 1 tablet by mouth Every Night. 7/19/21   Herve Cr MD   fluticasone (FLONASE) 50 MCG/ACT nasal spray 2 sprays into the nostril(s) as directed by provider As Needed for Rhinitis.    Herve Cr MD   furosemide (Lasix) 20 MG tablet Take 1 tablet by mouth Daily.  Patient not taking: Reported on 5/30/2023 4/11/23   Liot Rico APRN   glimepiride (AMARYL) 4 MG tablet Take 1 tablet by mouth Every Night. 7/19/21   Herve Cr MD   losartan (COZAAR) 25 MG tablet Take 1 tablet by mouth Daily. 7/19/21   Herve Cr MD   magnesium oxide (MAG-OX) 400 MG tablet Take 1 tablet by mouth 2 (Two) Times a Day. 11/25/21   Herve Cr MD   metFORMIN (GLUCOPHAGE) 1000 MG tablet Take 1 tablet by mouth 2 (Two) Times a Day With Meals. 7/19/21   Herve Cr MD   metoprolol succinate XL (TOPROL-XL) 25 MG 24 hr tablet Take 1 tablet by mouth 2 (Two) Times a Day. 0.5 tablet    Herve Cr MD   multivitamin (THERAGRAN) tablet tablet Take  by mouth Every Night.    Herve Cr MD   ONE TOUCH ULTRA TEST test strip USE TO TEST BLOOD SUGAR THREE TIMES A DAY 8/20/16   Provider, MD Herve   Zinc 50 MG  "tablet Take 1 tablet by mouth Every Night.    Provider, MD BRYNN Edgar have utilized all available immediate resources to obtain, update, and review the patient's current medications.    Objective     Vital Signs: /63 (BP Location: Left arm, Patient Position: Lying)   Pulse 85   Temp 97.5 °F (36.4 °C) (Oral)   Resp 22   Ht 193 cm (76\")   Wt (!) 153 kg (338 lb)   SpO2 95%   BMI 41.14 kg/m²   Physical Exam  Vitals and nursing note reviewed.   Constitutional:       Appearance: He is obese.   HENT:      Head: Normocephalic and atraumatic.      Right Ear: External ear normal.      Left Ear: External ear normal.   Neck:      Comments: Positive JVD   Cardiovascular:      Rate and Rhythm: Normal rate and regular rhythm.   Pulmonary:      Breath sounds: Rales present.   Abdominal:      General: Bowel sounds are normal.      Palpations: Abdomen is soft.   Musculoskeletal:         General: Tenderness present.      Cervical back: Normal range of motion.   Skin:     General: Skin is warm and dry.      Comments: Left lower extremity surgical wound.  Walking boot in place.   Neurological:      General: No focal deficit present.      Mental Status: He is alert and oriented to person, place, and time.      Motor: Weakness present.            Results Reviewed:  Lab Results (last 24 hours)       Procedure Component Value Units Date/Time    Urinalysis With Microscopic If Indicated (No Culture) - Urine, Clean Catch [[id number]]  (Abnormal) Collected: 06/03/23 0734    Specimen: Urine, Clean Catch Updated: 06/03/23 0756     Color, UA Yellow     Appearance, UA Clear     pH, UA <=5.0     Specific Gravity, UA 1.052     Glucose, UA Negative     Ketones, UA Negative     Bilirubin, UA Negative     Blood, UA Negative     Protein, UA Trace     Leuk Esterase, UA Negative     Nitrite, UA Negative     Urobilinogen, UA 0.2 E.U./dL    Narrative:      Urine microscopic not indicated.    [place] Draw [[id number]] Collected: 06/03/23 " 0519    Specimen: Blood Updated: 06/03/23 0630    Narrative:      The following orders were created for panel order [place] Draw.  Procedure                               Abnormality         Status                     ---------                               -----------         ------                     Green Top (Gel)[[id number]]                                  Final result               Lavender Top[[id number]]                                     Final result               Gold Top - SST[[id number]]                                   Final result               Light Blue Top[[id number]]                                   Final result                 Please view results for these tests on the individual orders.    Gold Top - SST [[id number]] Collected: 06/03/23 0519    Specimen: Blood Updated: 06/03/23 0630     Extra Tube Hold for add-ons.     Comment: Auto resulted.       Green Top (Gel) [[id number]] Collected: 06/03/23 0519    Specimen: Blood Updated: 06/03/23 0630     Extra Tube Hold for add-ons.     Comment: Auto resulted.       Light Blue Top [[id number]] Collected: 06/03/23 0519    Specimen: Blood Updated: 06/03/23 0630     Extra Tube Hold for add-ons.     Comment: Auto resulted       Lavender Top [[id number]] Collected: 06/03/23 0519    Specimen: Blood Updated: 06/03/23 0630     Extra Tube hold for add-on     Comment: Auto resulted       COVID-19 and FLU A/B PCR - Swab, Nasopharynx [[id number]]  (Normal) Collected: 06/03/23 0522    Specimen: Swab from Nasopharynx Updated: 06/03/23 0547     COVID19 Not Detected     Influenza A PCR Not Detected     Influenza B PCR Not Detected    Narrative:      Fact sheet for providers: https://www.fda.gov/media/695533/download    Fact sheet for patients: https://www.fda.gov/media/074175/download    Test performed by PCR.    BNP [[id number]]  (Abnormal) Collected: 06/03/23 0519    Specimen: Blood Updated: 06/03/23 0542     proBNP 2,701.0 pg/mL     Narrative:      Among patients with  dyspnea, NT-proBNP is highly sensitive for the detection of acute congestive heart failure. In addition NT-proBNP of <300 pg/ml effectively rules out acute congestive heart failure with 99% negative predictive value.    Results may be falsely decreased if patient taking Biotin.      Single High Sensitivity Troponin T [[id number]]  (Abnormal) Collected: 06/03/23 0519    Specimen: Blood Updated: 06/03/23 0542     HS Troponin T 43 ng/L     Narrative:      High Sensitive Troponin T Reference Range:  <10.0 ng/L- Negative Female for AMI  <15.0 ng/L- Negative Male for AMI  >=10 - Abnormal Female indicating possible myocardial injury.  >=15 - Abnormal Male indicating possible myocardial injury.   Clinicians would have to utilize clinical acumen, EKG, Troponin, and serial changes to determine if it is an Acute Myocardial Infarction or myocardial injury due to an underlying chronic condition.         Lactic Acid, Plasma [[id number]]  (Normal) Collected: 06/03/23 0519    Specimen: Blood Updated: 06/03/23 0541     Lactate 1.1 mmol/L     Magnesium [[id number]]  (Normal) Collected: 06/03/23 0519    Specimen: Blood Updated: 06/03/23 0539     Magnesium 2.1 mg/dL     Comprehensive Metabolic Panel [[id number]]  (Abnormal) Collected: 06/03/23 0519    Specimen: Blood Updated: 06/03/23 0539     Glucose 120 mg/dL      BUN 24 mg/dL      Creatinine 1.23 mg/dL      Sodium 133 mmol/L      Potassium 4.0 mmol/L      Chloride 97 mmol/L      CO2 25.0 mmol/L      Calcium 8.3 mg/dL      Total Protein 6.7 g/dL      Albumin 3.7 g/dL      ALT (SGPT) 18 U/L      AST (SGOT) 25 U/L      Alkaline Phosphatase 100 U/L      Total Bilirubin 0.8 mg/dL      Globulin 3.0 gm/dL      A/G Ratio 1.2 g/dL      BUN/Creatinine Ratio 19.5     Anion Gap 11.0 mmol/L      eGFR 66.8 mL/min/1.73     Narrative:      GFR Normal >60  Chronic Kidney Disease <60  Kidney Failure <15      CBC & Differential [[id number]]  (Abnormal) Collected: 06/03/23 0519    Specimen: Blood Updated:  06/03/23 0523    Narrative:      The following orders were created for panel order CBC & Differential.  Procedure                               Abnormality         Status                     ---------                               -----------         ------                     CBC Auto Differential[[id number]]        Abnormal            Final result                 Please view results for these tests on the individual orders.    CBC Auto Differential [[id number]]  (Abnormal) Collected: 06/03/23 0519    Specimen: Blood Updated: 06/03/23 0523     WBC 4.81 10*3/mm3      RBC 3.67 10*6/mm3      Hemoglobin 10.1 g/dL      Hematocrit 32.2 %      MCV 87.7 fL      MCH 27.5 pg      MCHC 31.4 g/dL      RDW 15.0 %      RDW-SD 48.2 fl      MPV 9.9 fL      Platelets 131 10*3/mm3      Neutrophil % 71.3 %      Lymphocyte % 17.3 %      Monocyte % 10.4 %      Eosinophil % 0.2 %      Basophil % 0.4 %      Immature Grans % 0.4 %      Neutrophils, Absolute 3.43 10*3/mm3      Lymphocytes, Absolute 0.83 10*3/mm3      Monocytes, Absolute 0.50 10*3/mm3      Eosinophils, Absolute 0.01 10*3/mm3      Basophils, Absolute 0.02 10*3/mm3      Immature Grans, Absolute 0.02 10*3/mm3      nRBC 0.0 /100 WBC     Blood Culture - Blood, Arm, Right [[id number]] Collected: 06/03/23 0519    Specimen: Blood from Arm, Right Updated: 06/03/23 0520          Imaging Results (Last 24 Hours)       Procedure Component Value Units Date/Time    XR Chest 1 View [[id number]] Collected: 06/03/23 0517     Updated: 06/03/23 0650    Narrative:      INDICATION:  hypotension    COMPARISON:  4/25/2023      Impression:      FINDINGS/IMPRESSION:  No consolidative pulmonary opacity, pleural effusion, or pneumothorax.  The heart is enlarged.  Left chest ICD projects in stable position.      CT Angiogram Chest [[id number]] Collected: 06/03/23 0613     Updated: 06/03/23 0650    Narrative:      INDICATION:  hypotension    COMPARISON:  4/26/2023    TECHNIQUE:  Volumetric CT scan of the  chest, from the thoracic inlet to the level of the  diaphragms, with intravenous contrast. 2D axial, sagittal, and coronal reformats  were performed. MIPS were performed on a separate workstation and reviewed.    FINDINGS:  Lungs: No focal consolidation or mass.  Pleura: No effusion or pneumothorax.  Heart: Mildly enlarged.  No evidence of acute right heart strain.  Vessels: Thoracic aorta is normal in caliber.  Bilateral pulmonary emboli are  again present, involving lobar and segmental branches, overall burden decreased  from prior examination.  Bones: No suspicious lesions.  Visualized upper abdomen: Unremarkable.      Impression:      Bilateral pulmonary emboli are again present, overall burden decreased from  prior examination.            I have personally reviewed and interpreted the radiology studies and ECG obtained at time of admission.     Assessment / Plan     Assessment:   Active Hospital Problems    Diagnosis     **Fever of unknown origin      I/P  Fevers of unknown origin.    Initial blood cultures are showingPositive for Enterobacter.  Patient has been initially placed on Zosyn.  We will continue to monitor culture and sensitivity report    Left lower extremity wound  Secondary to surgical repair by Dr. Thompson for charcot foot  Continue wound care and monitor    History of cardiac arrhythmias  Continue Multaq    Hypertension   continue Cozaar and metoprolol    Diabetes mellitus type 2  Sliding scale  Check A1c and TSH    Constipation  Bowel protocol    CODE STATUS full code  DVT prophylaxis Lovenox           Medical Decision Making  Number and Complexity of problems: 3 complex problems  Differential Diagnosis: sepsis     Conditions and Status:        Condition is unchanged.     [facility] Data  External documents reviewed:   My EKG interpretation:   My plain film and CT interpretation:   Tests considered but not ordered: none     Decision rules/scores evaluated (example [id number], [place], etc): n/a      Discussed with: Patient and agrees to care plan     Treatment Plan  As above    Care Planning  Shared decision making: Patient updated on current status and informed of proposed care plan; is in agreement with plan  Code status and discussions: full code    Disposition  Social Determinants of Health that impact treatment or disposition:   I expect the patient to be discharged to home in 3-5 days    I confirmed that the patient's Advance Care Plan is present, code status is documented, or surrogate decision maker is listed in the patient's medical record.       The patient's surrogate decision maker is the patient's wife    I discussed my findings and recommendations with the patient and his wife who is at bedside    Estimated length of stay is     The patient was seen and examined by me on Miriam 3, 2023 at 8:30 AM    Electronically signed by John Gutierrez DO, 06/03/23, 08:38 CDT.

## 2023-06-03 NOTE — SIGNIFICANT NOTE
06/03/23 1754   OTHER   Discipline physical therapist   Rehab Time/Intention   Session Not Performed other (see comments)  (Pt declines;states temperature going back up and he does not feel well enough to have physical therapy this pm. Pt agreeable to PT checking back tomorrow.)   Recommendation   PT - Next Appointment 06/04/23

## 2023-06-03 NOTE — PLAN OF CARE
Goal Outcome Evaluation:  Plan of Care Reviewed With: patient, spouse           Outcome Evaluation: OT re-eval completed. Pt reports his fever is breaking. Pt temp on entry was 99.1 deg F. Pt pleasant and agreeable. Pt required mod A sup<>sit with increased time. Pt agreed to sit EOB to bathe and change his gown. Min A to don<>doff gown. SBA UB bathing. Max A LB bathing. Dependent to don<>doff R sock. Pt dependent to urinate in urinal seated EOB. SBA to roll L<>R in bed to change sheets. Cont skilled IP/OT. No OT goals met.

## 2023-06-03 NOTE — DISCHARGE SUMMARY
"REHABILITATION DISCHARGE SUMMARY    NAME: Emre Lisa     : 1960  MRN: [id number]    ADMITTING PROVIDER: Javi Alfaro MD  DISCHARGING PROVIDER: Javi Alfaro MD     ADMITTED: 2023  DISCHARGED 6/3/2023  ADMISSION DIAGNOSES:  Medically complex patient [Z78.9]    DISCHARGE DIAGNOSES:     Medically complex patient    Type 2 diabetes mellitus with skin complication    Charcot's joint of left foot    Neuropathy due to secondary diabetes mellitus    Atrial fibrillation    Presence of biventricular cardiac pacemaker    Hypertension      CONSULTS:   Consults       Date and Time Order Name Status Description    2023  4:19 PM Inpatient Podiatry Consult      2023  6:33 AM Hematology & Oncology Inpatient Consult Completed           PRIMARY CARE PROVIDER AT DISCHARGE: Jamaal Bravo MD    PROCEDURES: AP only    BP 96/50 (BP Location: Right arm, Patient Position: Lying)   Pulse 91   Temp (!) 100.6 °F (38.1 °C) (Oral)   Resp 20   Ht 193 cm (76\")   Wt (!) 153 kg (338 lb 3.2 oz)   SpO2 92%   BMI 41.17 kg/m²     LABS:   Lab Results (last 24 hours)       Procedure Component Value Units Date/Time    POC Glucose Once [[id number]]  (Normal) Collected: 23    Specimen: Blood Updated: 23     Glucose 120 mg/dL      Comment: RN NotifiedOperator: [id number] ULICES JODIMeter ID: [id number]       Extra Tubes [[id number]] Collected: 23    Specimen: Blood, Venous Line Updated: 23    Narrative:      The following orders were created for panel order Extra Tubes.  Procedure                               Abnormality         Status                     ---------                               -----------         ------                     [facility][[id number]]                                   Final result                 Please view results for these tests on the individual orders.    [facility] [[id number]] Collected: 23    Specimen: Blood " "Updated: 06/02/23 2146     Extra Tube Hold for add-ons.     Comment: Auto resulted.       Procalcitonin [[id number]]  (Abnormal) Collected: 06/02/23 2024    Specimen: Blood Updated: 06/02/23 2100     Procalcitonin 0.69 ng/mL     Narrative:      As a Marker for Sepsis (Non-Neonates):    1. <0.5 ng/mL represents a low risk of severe sepsis and/or septic shock.  2. >2 ng/mL represents a high risk of severe sepsis and/or septic shock.    As a Marker for Lower Respiratory Tract Infections that require antibiotic therapy:    PCT on Admission    Antibiotic Therapy       6-12 Hrs later    >0.5                Strongly Recommended  >0.25 - <0.5        Recommended   0.1 - 0.25          Discouraged              Remeasure/reassess PCT  <0.1                Strongly Discouraged     Remeasure/reassess PCT    As 28 day mortality risk marker: \"Change in Procalcitonin Result\" (>80% or <=80%) if Day 0 (or Day 1) and Day 4 values are available. Refer to http://www.[employer]-pct-calculator.com    Change in PCT <=80%  A decrease of PCT levels below or equal to 80% defines a positive change in PCT test result representing a higher risk for 28-day all-cause mortality of patients diagnosed with severe sepsis for septic shock.    Change in PCT >80%  A decrease of PCT levels of more than 80% defines a negative change in PCT result representing a lower risk for 28-day all-cause mortality of patients diagnosed with severe sepsis or septic shock.       Comprehensive Metabolic Panel [[id number]]  (Abnormal) Collected: 06/02/23 2024    Specimen: Blood Updated: 06/02/23 2053     Glucose 125 mg/dL      BUN 24 mg/dL      Creatinine 1.20 mg/dL      Sodium 134 mmol/L      Potassium 4.1 mmol/L      Chloride 98 mmol/L      CO2 22.0 mmol/L      Calcium 8.7 mg/dL      Total Protein 7.1 g/dL      Albumin 3.9 g/dL      ALT (SGPT) 19 U/L      AST (SGOT) 21 U/L      Alkaline Phosphatase 113 U/L      Total Bilirubin 0.9 mg/dL      Globulin 3.2 gm/dL      A/G Ratio " 1.2 g/dL      BUN/Creatinine Ratio 20.0     Anion Gap 14.0 mmol/L      eGFR 68.8 mL/min/1.73     Narrative:      GFR Normal >60  Chronic Kidney Disease <60  Kidney Failure <15      Lactic Acid, Plasma [[id number]]  (Normal) Collected: 06/02/23 2024    Specimen: Blood Updated: 06/02/23 2048     Lactate 1.5 mmol/L     CBC & Differential [[id number]]  (Abnormal) Collected: 06/02/23 2024    Specimen: Blood Updated: 06/02/23 2035    Narrative:      The following orders were created for panel order CBC & Differential.  Procedure                               Abnormality         Status                     ---------                               -----------         ------                     CBC Auto Differential[[id number]]        Abnormal            Final result                 Please view results for these tests on the individual orders.    CBC Auto Differential [[id number]]  (Abnormal) Collected: 06/02/23 2024    Specimen: Blood Updated: 06/02/23 2035     WBC 5.68 10*3/mm3      RBC 3.80 10*6/mm3      Hemoglobin 10.6 g/dL      Hematocrit 32.7 %      MCV 86.1 fL      MCH 27.9 pg      MCHC 32.4 g/dL      RDW 14.9 %      RDW-SD 47.6 fl      MPV 10.2 fL      Platelets 152 10*3/mm3      Neutrophil % 74.6 %      Lymphocyte % 14.1 %      Monocyte % 10.2 %      Eosinophil % 0.2 %      Basophil % 0.5 %      Immature Grans % 0.4 %      Neutrophils, Absolute 4.24 10*3/mm3      Lymphocytes, Absolute 0.80 10*3/mm3      Monocytes, Absolute 0.58 10*3/mm3      Eosinophils, Absolute 0.01 10*3/mm3      Basophils, Absolute 0.03 10*3/mm3      Immature Grans, Absolute 0.02 10*3/mm3      nRBC 0.0 /100 WBC     Blood Culture - Blood, Arm, Left [[id number]] Collected: 06/02/23 2024    Specimen: Blood from Arm, Left Updated: 06/02/23 2032    Blood Culture - Blood, Hand, Left [[id number]] Collected: 06/02/23 2024    Specimen: Blood from Hand, Left Updated: 06/02/23 2032    POC Glucose Once [[id number]]  (Abnormal) Collected: 06/02/23 2898    Specimen:  Blood Updated: 06/02/23 1646     Glucose 140 mg/dL      Comment: RN NotifiedOperator: [id number] AURELIA TIFFANYMeter ID: [id number]       POC Glucose Once [[id number]]  (Abnormal) Collected: 06/02/23 1047    Specimen: Blood Updated: 06/02/23 1646     Glucose 146 mg/dL      Comment: RN NotifiedOperator: [id number] AURELIA TIFFANYMeter ID: [id number]       POC Glucose Once [[id number]]  (Abnormal) Collected: 06/02/23 0648    Specimen: Blood Updated: 06/02/23 0700     Glucose 180 mg/dL      Comment: : [id number] ULICES JODIMeter ID: [id number]                        REHABILITATION COURSE: Physical therapy and is ambulating with assistance with a walker morning he developed some confusion lethargy but throughout the day he was responsive had some nausea and vomiting examination lungs were clear abdomen was nontender no masses was cooperative arousable through the night he got worse he was visit he was examined and sepsis work-up was done about 9 PM and no significant findings were done identified throughout the night he just continued to get more lethargic placed on 1 L of O2 he became more concerned as his blood pressure began to drop we called Dr. Johnie PULIDO and he suggested we transfer him to the emergency room which we did Dr. August to rating condition increasing lethargy was except    CONDITION ON discharge stable allergic and hypotensive    DISPOSITION: Here to the emergency room after discharge from ARU    DISCHARGE DIET: Same as Admission diet. See below  Dietary Orders (From admission, onward)       Start     Ordered    05/31/23 1800  DIET MESSAGE Please send large portion of eggs @ BF.  Pt likes omelet and garcia.  RD okay with pt ordering extra food on meals per request.  Daily With Breakfast, Lunch & Dinner      Comments: Please send large portion of eggs @ BF.  Pt likes omelet and garcia.  RD okay with pt ordering extra food on meals per request.    05/31/23 1410    05/30/23 1800  DIET MESSAGE Pt does  not like milk, soup, OJ/fruit juice, coffee.  Daily With Breakfast, Lunch & Dinner      Comments: Pt does not like milk, soup, OJ/fruit juice, coffee.    05/30/23 1450    05/30/23 1644  Diet: Diabetic Diets; Consistent Carbohydrate; Texture: Regular Texture (IDDSI 7); Fluid Consistency: Thin (IDDSI 0)  Diet Effective Now        References:    Diet Order [facility]   Question Answer Comment   Diets: Diabetic Diets    Diabetic Diet: Consistent Carbohydrate    Texture: Regular Texture (IDDSI 7)    Fluid Consistency: Thin (IDDSI 0)        05/30/23 1644                     DISCHARGE ACTIVITY: See room ER    DISCHARGE MEDICATIONS     Discharge Medications        Continue These Medications        Instructions Start Date   ONE TOUCH ULTRA 2 w/Device kit   No dose, route, or frequency recorded.             ASK your doctor about these medications        Instructions Start Date   ascorbic acid 1000 MG tablet  Commonly known as: VITAMIN C   1,000 mg, Oral, Nightly      Cholecalciferol 125 MCG (5000 UT) tablet   5,000 Units, Oral, Nightly      clindamycin 300 MG capsule  Commonly known as: CLEOCIN   300 mg, Oral, 3 Times Daily      dronedarone 400 MG tablet  Commonly known as: MULTAQ   400 mg, Oral, Nightly      fexofenadine 180 MG tablet  Commonly known as: ALLEGRA   1 tablet, Oral, Nightly      fluticasone 50 MCG/ACT nasal spray  Commonly known as: FLONASE   2 sprays, Nasal, As Needed      furosemide 20 MG tablet  Commonly known as: Lasix   20 mg, Oral, Daily      glimepiride 4 MG tablet  Commonly known as: AMARYL   4 mg, Oral, Nightly      losartan 25 MG tablet  Commonly known as: COZAAR   25 mg, Oral, Daily      magnesium oxide 400 MG tablet  Commonly known as: MAG-OX   400 mg, Oral, 2 Times Daily      metFORMIN 1000 MG tablet  Commonly known as: GLUCOPHAGE   1,000 mg, Oral, 2 Times Daily With Meals      metoprolol succinate XL 25 MG 24 hr tablet  Commonly known as: TOPROL-XL   25 mg, Oral, 2 Times Daily, 0.5 tablet        multivitamin tablet tablet   Oral, Nightly      ONE TOUCH ULTRA TEST test strip  Generic drug: glucose blood   USE TO TEST BLOOD SUGAR THREE TIMES A DAY      Probiotic 1-250 BILLION-MG capsule   1 capsule, Oral, 2 Times Daily      Zinc 50 MG tablet   1 tablet, Oral, Nightly               FOLLOW UP:      Follow-up Information       Jamaal Bravo MD .    Specialty: Family Medicine  Contact information:  [address] [place], [place] [id number] [phone]                             No future appointments.     Javi Alfaro MD          This document has been electronically signed by Javi Alfaro MD on Miriam 3, 2023 04:42 CDT        Part of this note may be an electronic transcription/translation of spoken language to printed text using the Dragon Dictation System.

## 2023-06-03 NOTE — DISCHARGE SUMMARY
"REHABILITATION DISCHARGE SUMMARY    NAME: Emre Lisa     : 1960  MRN: [id number]    ADMITTING PROVIDER: Javi Alfaro MD  DISCHARGING PROVIDER: Javi Alfaro MD     ADMITTED: 2023  DISCHARGED: 6/3/2023    ADMISSION DIAGNOSES:  Medically complex patient [Z78.9]    DISCHARGE DIAGNOSES:     Medically complex patient    Type 2 diabetes mellitus with skin complication    Charcot's joint of left foot    Neuropathy due to secondary diabetes mellitus    Atrial fibrillation    Presence of biventricular cardiac pacemaker    Hypertension      CONSULTS:   Consults       Date and Time Order Name Status Description    2023  4:19 PM Inpatient Podiatry Consult      2023  6:33 AM Hematology & Oncology Inpatient Consult Completed           PRIMARY CARE PROVIDER AT DISCHARGE: Jamaal Bravo MD    PROCEDURES: This work-up    BP 96/50 (BP Location: Right arm, Patient Position: Lying)   Pulse 91   Temp (!) 100.6 °F (38.1 °C) (Oral)   Resp 20   Ht 193 cm (76\")   Wt (!) 153 kg (338 lb 3.2 oz)   SpO2 92%   BMI 41.17 kg/m²     LABS:   Lab Results (last 24 hours)       Procedure Component Value Units Date/Time    POC Glucose Once [[id number]]  (Normal) Collected: 23    Specimen: Blood Updated: 23     Glucose 120 mg/dL      Comment: RN NotifiedOperator: [id number] ULICES JODIMeter ID: [id number]       Extra Tubes [[id number]] Collected: 23    Specimen: Blood, Venous Line Updated: 23    Narrative:      The following orders were created for panel order Extra Tubes.  Procedure                               Abnormality         Status                     ---------                               -----------         ------                     [facility][[id number]]                                   Final result                 Please view results for these tests on the individual orders.    [facility] [[id number]] Collected: 23    Specimen: " "Blood Updated: 06/02/23 2146     Extra Tube Hold for add-ons.     Comment: Auto resulted.       Procalcitonin [[id number]]  (Abnormal) Collected: 06/02/23 2024    Specimen: Blood Updated: 06/02/23 2100     Procalcitonin 0.69 ng/mL     Narrative:      As a Marker for Sepsis (Non-Neonates):    1. <0.5 ng/mL represents a low risk of severe sepsis and/or septic shock.  2. >2 ng/mL represents a high risk of severe sepsis and/or septic shock.    As a Marker for Lower Respiratory Tract Infections that require antibiotic therapy:    PCT on Admission    Antibiotic Therapy       6-12 Hrs later    >0.5                Strongly Recommended  >0.25 - <0.5        Recommended   0.1 - 0.25          Discouraged              Remeasure/reassess PCT  <0.1                Strongly Discouraged     Remeasure/reassess PCT    As 28 day mortality risk marker: \"Change in Procalcitonin Result\" (>80% or <=80%) if Day 0 (or Day 1) and Day 4 values are available. Refer to http://www.[employer]-pct-calculator.com    Change in PCT <=80%  A decrease of PCT levels below or equal to 80% defines a positive change in PCT test result representing a higher risk for 28-day all-cause mortality of patients diagnosed with severe sepsis for septic shock.    Change in PCT >80%  A decrease of PCT levels of more than 80% defines a negative change in PCT result representing a lower risk for 28-day all-cause mortality of patients diagnosed with severe sepsis or septic shock.       Comprehensive Metabolic Panel [[id number]]  (Abnormal) Collected: 06/02/23 2024    Specimen: Blood Updated: 06/02/23 2053     Glucose 125 mg/dL      BUN 24 mg/dL      Creatinine 1.20 mg/dL      Sodium 134 mmol/L      Potassium 4.1 mmol/L      Chloride 98 mmol/L      CO2 22.0 mmol/L      Calcium 8.7 mg/dL      Total Protein 7.1 g/dL      Albumin 3.9 g/dL      ALT (SGPT) 19 U/L      AST (SGOT) 21 U/L      Alkaline Phosphatase 113 U/L      Total Bilirubin 0.9 mg/dL      Globulin 3.2 gm/dL      A/G " Ratio 1.2 g/dL      BUN/Creatinine Ratio 20.0     Anion Gap 14.0 mmol/L      eGFR 68.8 mL/min/1.73     Narrative:      GFR Normal >60  Chronic Kidney Disease <60  Kidney Failure <15      Lactic Acid, Plasma [[id number]]  (Normal) Collected: 06/02/23 2024    Specimen: Blood Updated: 06/02/23 2048     Lactate 1.5 mmol/L     CBC & Differential [[id number]]  (Abnormal) Collected: 06/02/23 2024    Specimen: Blood Updated: 06/02/23 2035    Narrative:      The following orders were created for panel order CBC & Differential.  Procedure                               Abnormality         Status                     ---------                               -----------         ------                     CBC Auto Differential[[id number]]        Abnormal            Final result                 Please view results for these tests on the individual orders.    CBC Auto Differential [[id number]]  (Abnormal) Collected: 06/02/23 2024    Specimen: Blood Updated: 06/02/23 2035     WBC 5.68 10*3/mm3      RBC 3.80 10*6/mm3      Hemoglobin 10.6 g/dL      Hematocrit 32.7 %      MCV 86.1 fL      MCH 27.9 pg      MCHC 32.4 g/dL      RDW 14.9 %      RDW-SD 47.6 fl      MPV 10.2 fL      Platelets 152 10*3/mm3      Neutrophil % 74.6 %      Lymphocyte % 14.1 %      Monocyte % 10.2 %      Eosinophil % 0.2 %      Basophil % 0.5 %      Immature Grans % 0.4 %      Neutrophils, Absolute 4.24 10*3/mm3      Lymphocytes, Absolute 0.80 10*3/mm3      Monocytes, Absolute 0.58 10*3/mm3      Eosinophils, Absolute 0.01 10*3/mm3      Basophils, Absolute 0.03 10*3/mm3      Immature Grans, Absolute 0.02 10*3/mm3      nRBC 0.0 /100 WBC     Blood Culture - Blood, Arm, Left [[id number]] Collected: 06/02/23 2024    Specimen: Blood from Arm, Left Updated: 06/02/23 2032    Blood Culture - Blood, Hand, Left [[id number]] Collected: 06/02/23 2024    Specimen: Blood from Hand, Left Updated: 06/02/23 2032    POC Glucose Once [[id number]]  (Abnormal) Collected: 06/02/23 8161     Specimen: Blood Updated: 06/02/23 1646     Glucose 140 mg/dL      Comment: RN NotifiedOperator: [id number] AURELIA BEEYMeter ID: [id number]       POC Glucose Once [[id number]]  (Abnormal) Collected: 06/02/23 1047    Specimen: Blood Updated: 06/02/23 1646     Glucose 146 mg/dL      Comment: RN NotifiedOperator: [id number] AURELIA BEEYMeter ID: [id number]       POC Glucose Once [[id number]]  (Abnormal) Collected: 06/02/23 0648    Specimen: Blood Updated: 06/02/23 0700     Glucose 180 mg/dL      Comment: : [id number] ULICES JODIMeter ID: [id number]                 Told this evening that the patient had not had a bowel movement that was probably about 4:00 who ordered a suppository about 8:00 nurses called me to me patient had temperature 102.6 clear we did a sepsis work-up lab did not show any abnormality left lactic acid was normal white counts normal patient does not really feel ill.  He has been little confused today according to his wife lamination of his legs shows no evidence of an abscess or infection new Unna boot was applied and a splint was reapplied.  CT of the head shows no evidence of intracranial pathology other than calcification of his basilar artery but no masses or evidence of infection and no other space-occupying lesion indicate stroke or other problems.  And CT of the abdomen shows a lot of feces in the colon but but no evidence for diverticulitis infection gallbladder is normal plan is #1 observation Tylenol for temperature Zofran for nausea and vomiting basically keeping him this clear liquids Duphalac tablets 2 of them seem to get his bowels to move it for the bowel movement problem tomorrow we will do an enema to see if we get him bowels to work he does not have a history of gastroparesis or problems like that his abdomen physical exam obviously is rotund but no masses or no tenderness and he has no specific complaints other just I just do not feel well              That was  earlier this evening I came in to see him is around 9:00 a seem to be fine changed his dressing on his foot.  This morning about 4:00 I was called decreasing blood pressure DC decreasing alertness therefore of called Dr. Kearns and then Dr. August in the emergency room plan was transferred to the emergency room with discharge from ARU transfer to the emergency room and let them work him up probably needs blood gases           REHABILITATION COURSE: Was doing well until yesterday morning when he became less alert but progressed throughout the day only abnormality that he has not had a bowel movement in a few days just continued deteriorate overnight    CONDITION ON DISCHARGE: Hypotensive lethargic transferred to the emergency room    DISPOSITION: Patient to the emergency room coordination with Dr. Kearns and emergency room physicians    DISCHARGE DIET: Same as Admission diet. See below  Dietary Orders (From admission, onward)       Start     Ordered    05/31/23 1800  DIET MESSAGE Please send large portion of eggs @ BF.  Pt likes omelet and garcia.  RD okay with pt ordering extra food on meals per request.  Daily With Breakfast, Lunch & Dinner      Comments: Please send large portion of eggs @ BF.  Pt likes omelet and garcia.  RD okay with pt ordering extra food on meals per request.    05/31/23 1410    05/30/23 1800  DIET MESSAGE Pt does not like milk, soup, OJ/fruit juice, coffee.  Daily With Breakfast, Lunch & Dinner      Comments: Pt does not like milk, soup, OJ/fruit juice, coffee.    05/30/23 1450    05/30/23 1644  Diet: Diabetic Diets; Consistent Carbohydrate; Texture: Regular Texture (IDDSI 7); Fluid Consistency: Thin (IDDSI 0)  Diet Effective Now        References:    Diet Order [facility]   Question Answer Comment   Diets: Diabetic Diets    Diabetic Diet: Consistent Carbohydrate    Texture: Regular Texture (IDDSI 7)    Fluid Consistency: Thin (IDDSI 0)        05/30/23 1644                     DISCHARGE  ACTIVITY: Fall    DISCHARGE MEDICATIONS     Discharge Medications        Continue These Medications        Instructions Start Date   ONE TOUCH ULTRA 2 w/Device kit   No dose, route, or frequency recorded.             ASK your doctor about these medications        Instructions Start Date   ascorbic acid 1000 MG tablet  Commonly known as: VITAMIN C   1,000 mg, Oral, Nightly      Cholecalciferol 125 MCG (5000 UT) tablet   5,000 Units, Oral, Nightly      clindamycin 300 MG capsule  Commonly known as: CLEOCIN   300 mg, Oral, 3 Times Daily      dronedarone 400 MG tablet  Commonly known as: MULTAQ   400 mg, Oral, Nightly      fexofenadine 180 MG tablet  Commonly known as: ALLEGRA   1 tablet, Oral, Nightly      fluticasone 50 MCG/ACT nasal spray  Commonly known as: FLONASE   2 sprays, Nasal, As Needed      furosemide 20 MG tablet  Commonly known as: Lasix   20 mg, Oral, Daily      glimepiride 4 MG tablet  Commonly known as: AMARYL   4 mg, Oral, Nightly      losartan 25 MG tablet  Commonly known as: COZAAR   25 mg, Oral, Daily      magnesium oxide 400 MG tablet  Commonly known as: MAG-OX   400 mg, Oral, 2 Times Daily      metFORMIN 1000 MG tablet  Commonly known as: GLUCOPHAGE   1,000 mg, Oral, 2 Times Daily With Meals      metoprolol succinate XL 25 MG 24 hr tablet  Commonly known as: TOPROL-XL   25 mg, Oral, 2 Times Daily, 0.5 tablet       multivitamin tablet tablet   Oral, Nightly      ONE TOUCH ULTRA TEST test strip  Generic drug: glucose blood   USE TO TEST BLOOD SUGAR THREE TIMES A DAY      Probiotic 1-250 BILLION-MG capsule   1 capsule, Oral, 2 Times Daily      Zinc 50 MG tablet   1 tablet, Oral, Nightly               FOLLOW UP:         No future appointments.     Javi Alfaro MD          This document has been electronically signed by Javi Alfaro MD on Miriam 3, 2023 04:27 CDT        Part of this note may be an electronic transcription/translation of spoken language to printed text using the Dragon Dictation  System.

## 2023-06-03 NOTE — ED NOTES
"Pt presents to the ED from the ARU unit in this hospital for chief complaint of hypotension. Nurse on ARU called to report pt having sudden hypotension since midnight tonight along with fever (102.6) and AMS (not speaking well and lethargic). Nurse also reports pt has not had a BM in 5 days, and has been c/o \"feeling full\". Nurse reports pt was given Tylenol for fever and had Head CT and Abdominal CT done with no acute problems noted in either scan. Nurse reports pt was able to eat a sandwich last night and take his PO meds without any N/V last night. Last BP in ARU was noted to be 78/40. Dr. Alfaro called and spoke with DR. August prior to pt being transported to ED. ARU nurse reports she started IV in pt left hand.  "

## 2023-06-03 NOTE — ED PROVIDER NOTES
Subjective   History of Present Illness  63-year-old male was admitted to ARU following podiatry surgery.  Patient developed a fever overnight.  There was concern for sepsis and received CT scans of the head and abdomen/pelvis that were unremarkable.  Patient's blood pressure dropped to 60 systolic reportedly.  ARU physician spoke with the on-call hospitalist request patient be transferred to the ER to be evaluated and stabilized prior to admission to the hospital.  Patient states he is tired, but denies having any symptoms.  He does not remember what happened earlier tonight.    History provided by:  Patient (RNs)   used: No      Review of Systems   Constitutional:  Positive for activity change, fatigue and fever. Negative for appetite change, chills and diaphoresis.   HENT:  Negative for drooling.    Eyes:  Negative for redness.   Respiratory:  Negative for cough, chest tightness, shortness of breath and wheezing.    Cardiovascular:  Negative for chest pain and palpitations.   Gastrointestinal:  Negative for abdominal pain, nausea and vomiting.   Genitourinary:  Negative for flank pain.   Musculoskeletal:  Negative for arthralgias and myalgias.   Skin:  Negative for color change.   Neurological:  Negative for seizures.   Psychiatric/Behavioral:  Negative for confusion.      Past Medical History:   Diagnosis Date    Arthritis     Atrial fibrillation     Diabetes mellitus     Diabetic foot ulcer associated with type 2 diabetes mellitus     left great toe    Hallux malleus of left foot     Hammer toe     Hypertension     MI (myocardial infarction)     Neuropathy in diabetes     Onychomycosis     Presence of biventricular cardiac pacemaker     Rheumatoid arthritis        Allergies   Allergen Reactions    Heparin Other (See Comments)     Heparin induce thrombocytopenia     Januvia [Sitagliptin] Hives    Lipitor [Atorvastatin] Other (See Comments)     Muscle Cramps...    Shellfish Allergy Swelling  and Other (See Comments)     Age 13 this occurred doesn't remember what he ate swelled lips and face    Sulfa Antibiotics Rash     Pt was age of 4 when he was told by his family he had a reaction, pt is unsure        Past Surgical History:   Procedure Laterality Date    AMPUTATION DIGIT Right 03/05/2017    Procedure: RIGHT AMPUTATION TRANSMETATRASAL , RECESSION GASTROCNEMOUS;  Surgeon: Marco A Thompson DPM;  Location: [facility] OR;  Service:     ANKLE FUSION Left 04/13/2023    Procedure: REDUCTION OF LEFT ANKLE DEFORMITY WITH APPLICATION OF EXTERNAL FIXATOR;  Surgeon: Marco A Thompson DPM;  Location: [facility] OR;  Service: Podiatry;  Laterality: Left;    CARDIAC CATHETERIZATION      CARDIAC DEFIBRILLATOR PLACEMENT  2010, 2016    CARPAL TUNNEL RELEASE  2015    elbow and wrist left arm    FOOT FUSION Left 05/15/2023    Procedure: REMOVAL OF EXTERNAL FIXATOR LEFT LOWER EXTREMITY WITH TIBIAL TALOCALCANEAL ARTHRODESIS  AND ALL INDICATED PROCEDURES;  Surgeon: Marco A Thompson DPM;  Location: [facility] OR;  Service: Podiatry;  Laterality: Left;    FOOT IRRIGATION, DEBRIDEMENT AND REPAIR Left 03/07/2018    Procedure: FOOT IRRIGATION, DEBRIDEMENT AND REPAIR;  Surgeon: Marco A Thompson DPM;  Location: [facility] OR;  Service:     FOOT IRRIGATION, DEBRIDEMENT AND REPAIR Left 03/10/2018    Procedure: FOOT IRRIGATION, DEBRIDEMENT AND REPAIR;  Surgeon: Marco A Thompson DPM;  Location: [facility] OR;  Service: Podiatry    FOOT WOUND CLOSURE Right 03/02/2017    Procedure: INCISION AND DRAINAGE, RIGHT FOOT;  Surgeon: Tung Rosenthal DPM;  Location: [facility] OR;  Service:     HAMMER TOE REPAIR Left 02/15/2018    Procedure: HAMMERTOE CORRECTION LEFT SECOND, THIRD AND FOURTH TOES AND HALLUX INTERPHALANGEAL JOINT ARTHRODESIS LEFT FOOT (Micro Asnis, 4.0 & 5.0 Asnis)      (c-arm);  Surgeon: Marco A Thompson DPM;  Location: [facility] OR;  Service:     HARDWARE REMOVAL Left 03/05/2018    Procedure: ANKLE/FOOT HARDWARE REMOVAL;  Surgeon: Marco A Thompson DPM;   Location: [facility] OR;  Service:     INCISION AND DRAINAGE LEG Left 03/05/2018    Procedure: INCISION AND DRAINAGE LOWER EXTREMITY;  Surgeon: Marco A Thompson DPM;  Location: [facility] OR;  Service:     PLANTAR FASCIA RELEASE Left 11/21/2019    Procedure: PLANTAR PLANING LEFT FOOT AND ALL OTHER INDICATED PROCEDURES;  Surgeon: Marco A Thompson DPM;  Location: [facility];  Service: Podiatry    TOE AMPUTATION Right 2016    TONSILECTOMY, ADENOIDECTOMY, BILATERAL MYRINGOTOMY AND TUBES      age 25       Family History   Problem Relation Age of Onset    Diabetes Father     Stroke Father     No Known Problems Maternal Grandmother     No Known Problems Maternal Grandfather     No Known Problems Paternal Grandmother     No Known Problems Paternal Grandfather     No Known Problems Daughter     No Known Problems Daughter     No Known Problems Son     Heart disease Other     Hypertension Other        Social History     Socioeconomic History    Marital status:    Tobacco Use    Smoking status: Never    Smokeless tobacco: Never   Vaping Use    Vaping Use: Never used   Substance and Sexual Activity    Alcohol use: Yes     Comment: social, once every three months    Drug use: No    Sexual activity: Defer           Objective   Vitals:    06/03/23 0630 06/03/23 0645 06/03/23 0700 06/03/23 0728   BP: 108/62 103/65 108/67 124/63   BP Location:    Left arm   Patient Position:    Lying   Pulse: 80 86 92 85   Resp:   18 22   Temp:    97.5 °F (36.4 °C)   TempSrc:    Oral   SpO2: 95% 94% 95% 95%   Weight:       Height:           Physical Exam  Vitals and nursing note reviewed.   Constitutional:       General: He is not in acute distress.     Appearance: He is well-developed. He is obese. He is ill-appearing. He is not toxic-appearing or diaphoretic.   Eyes:      Conjunctiva/sclera: Conjunctivae normal.   Pulmonary:      Effort: Pulmonary effort is normal. No accessory muscle usage or respiratory distress.   Chest:      Chest wall: No  tenderness.   Abdominal:      Palpations: Abdomen is soft.      Tenderness: There is no abdominal tenderness (deep palpation). There is no guarding or rebound.   Musculoskeletal:      Comments: Left leg in a boot.   Skin:     General: Skin is warm and dry.      Capillary Refill: Capillary refill takes less than 2 seconds.   Neurological:      Mental Status: He is alert and oriented to person, place, and time.       ECG 12 Lead      Date/Time: 6/3/2023 8:20 AM  Performed by: Jorge Luis Regan MD  Authorized by: Jorge Luis Regan MD   Interpreted by physician  Rhythm: sinus rhythm  Rate: normal  Conduction: conduction normal  Other findings: prolonged QTc interval  Clinical impression: abnormal ECG             ED Course  ED Course as of 06/03/23 0818   Sat Jun 03, 2023   0612 Patient checked out to Dr. Regan. [BH]   0744 CT Angiogram Chest [MO]   0814 Spoke to Dr. Ramirez who accepted patient. [MO]      ED Course User Index  [BH] Arthur August MD  [MO] Jorge Luis Regan MD      Results for orders placed or performed during the hospital encounter of 06/03/23   COVID-19 and FLU A/B PCR - Swab, Nasopharynx    Specimen: Nasopharynx; Swab   Result Value Ref Range    COVID19 Not Detected Not Detected - Ref. Range    Influenza A PCR Not Detected Not Detected    Influenza B PCR Not Detected Not Detected   Comprehensive Metabolic Panel    Specimen: Blood   Result Value Ref Range    Glucose 120 (H) 65 - 99 mg/dL    BUN 24 (H) 8 - 23 mg/dL    Creatinine 1.23 0.76 - 1.27 mg/dL    Sodium 133 (L) 136 - 145 mmol/L    Potassium 4.0 3.5 - 5.2 mmol/L    Chloride 97 (L) 98 - 107 mmol/L    CO2 25.0 22.0 - 29.0 mmol/L    Calcium 8.3 (L) 8.6 - 10.5 mg/dL    Total Protein 6.7 6.0 - 8.5 g/dL    Albumin 3.7 3.5 - 5.2 g/dL    ALT (SGPT) 18 1 - 41 U/L    AST (SGOT) 25 1 - 40 U/L    Alkaline Phosphatase 100 39 - 117 U/L    Total Bilirubin 0.8 0.0 - 1.2 mg/dL    Globulin 3.0 gm/dL    A/G Ratio 1.2 g/dL    BUN/Creatinine Ratio  19.5 7.0 - 25.0    Anion Gap 11.0 5.0 - 15.0 mmol/L    eGFR 66.8 >60.0 mL/min/1.73   Urinalysis With Microscopic If Indicated (No Culture) - Urine, Clean Catch    Specimen: Urine, Clean Catch   Result Value Ref Range    Color, UA Yellow Yellow, Straw, Dark Yellow, Kym    Appearance, UA Clear Clear    pH, UA <=5.0 5.0 - 9.0    Specific Gravity, UA 1.052 (H) 1.003 - 1.030    Glucose, UA Negative Negative    Ketones, UA Negative Negative    Bilirubin, UA Negative Negative    Blood, UA Negative Negative    Protein, UA Trace (A) Negative    Leuk Esterase, UA Negative Negative    Nitrite, UA Negative Negative    Urobilinogen, UA 0.2 E.U./dL 0.2 - 1.0 E.U./dL   BNP    Specimen: Blood   Result Value Ref Range    proBNP 2,701.0 (H) 0.0 - 900.0 pg/mL   Single High Sensitivity Troponin T    Specimen: Blood   Result Value Ref Range    HS Troponin T 43 (H) <15 ng/L   Lactic Acid, Plasma    Specimen: Blood   Result Value Ref Range    Lactate 1.1 0.5 - 2.0 mmol/L   Magnesium    Specimen: Blood   Result Value Ref Range    Magnesium 2.1 1.6 - 2.4 mg/dL   CBC Auto Differential    Specimen: Blood   Result Value Ref Range    WBC 4.81 3.40 - 10.80 10*3/mm3    RBC 3.67 (L) 4.14 - 5.80 10*6/mm3    Hemoglobin 10.1 (L) 13.0 - 17.7 g/dL    Hematocrit 32.2 (L) 37.5 - 51.0 %    MCV 87.7 79.0 - 97.0 fL    MCH 27.5 26.6 - 33.0 pg    MCHC 31.4 (L) 31.5 - 35.7 g/dL    RDW 15.0 12.3 - 15.4 %    RDW-SD 48.2 37.0 - 54.0 fl    MPV 9.9 6.0 - 12.0 fL    Platelets 131 (L) 140 - 450 10*3/mm3    Neutrophil % 71.3 42.7 - 76.0 %    Lymphocyte % 17.3 (L) 19.6 - 45.3 %    Monocyte % 10.4 5.0 - 12.0 %    Eosinophil % 0.2 (L) 0.3 - 6.2 %    Basophil % 0.4 0.0 - 1.5 %    Immature Grans % 0.4 0.0 - 0.5 %    Neutrophils, Absolute 3.43 1.70 - 7.00 10*3/mm3    Lymphocytes, Absolute 0.83 0.70 - 3.10 10*3/mm3    Monocytes, Absolute 0.50 0.10 - 0.90 10*3/mm3    Eosinophils, Absolute 0.01 0.00 - 0.40 10*3/mm3    Basophils, Absolute 0.02 0.00 - 0.20 10*3/mm3    Immature  Grans, Absolute 0.02 0.00 - 0.05 10*3/mm3    nRBC 0.0 0.0 - 0.2 /100 WBC   ECG 12 Lead Other; hypotension   Result Value Ref Range    QT Interval 406 ms    QTC Interval 485 ms   PREVIOUS HISTORY    Specimen: Arm, Left; Blood   Result Value Ref Range    Previous History No record on File    Green Top (Gel)   Result Value Ref Range    Extra Tube Hold for add-ons.    Lavender Top   Result Value Ref Range    Extra Tube hold for add-on    Gold Top - SST   Result Value Ref Range    Extra Tube Hold for add-ons.    Light Blue Top   Result Value Ref Range    Extra Tube Hold for add-ons.      CT Angiogram Chest   Final Result   Bilateral pulmonary emboli are again present, overall burden decreased from   prior examination.         XR Chest 1 View   Final Result   FINDINGS/IMPRESSION:   No consolidative pulmonary opacity, pleural effusion, or pneumothorax.   The heart is enlarged.   Left chest ICD projects in stable position.                                                Medical Decision Making  Patient is a 63 years old who presented here from ARU due to hypotension and fever yesterday.  CBC, lactate was done and all normal.  COVID was done and all negative.  CT of abdomen and pelvis was done down normal.  CT of the chest which showed pulmonary embolism the patient is on Eliquis.  Patient was given 2 L of fluid and some Zosyn empirically.  Spoke to Dr. Gutierrez who accepted patient.    Problems Addressed:  Fever of unknown origin: complicated acute illness or injury  Other specified hypotension: complicated acute illness or injury    Amount and/or Complexity of Data Reviewed  Labs: ordered.  Radiology: ordered. Decision-making details documented in ED Course.  ECG/medicine tests: ordered.    Risk  Decision regarding hospitalization.        Final diagnoses:   Fever of unknown origin   Other specified hypotension       ED Disposition  ED Disposition       ED Disposition   Decision to Admit    Condition   --    Comment   --                No follow-up provider specified.       Medication List      No changes were made to your prescriptions during this visit.            Jorge Luis Regan MD  06/03/23 0818       Jorge Luis Regan MD  06/03/23 0820

## 2023-06-03 NOTE — ED NOTES
Nursing report ED to floor  Emre Lisa  63 y.o.  male    HPI:   Chief Complaint   Patient presents with    Hypotension       Admitting doctor:   No admitting provider for patient encounter.    Consulting provider(s):  Consults       Date and Time Order Name Status Description    4/28/2023  6:33 AM Hematology & Oncology Inpatient Consult Completed              Admitting diagnosis:   The primary encounter diagnosis was Fever of unknown origin. A diagnosis of Other specified hypotension was also pertinent to this visit.    Code status:   Current Code Status       Date Active Code Status Order ID Comments User Context       Prior            Allergies:   Heparin, Januvia [sitagliptin], Lipitor [atorvastatin], Shellfish allergy, and Sulfa antibiotics    Intake and Output    Intake/Output Summary (Last 24 hours) at 6/3/2023 0819  Last data filed at 6/3/2023 0618  Gross per 24 hour   Intake 1480 ml   Output 450 ml   Net 1030 ml       Weight:       06/03/23  0440   Weight: (!) 153 kg (338 lb)       Most recent vitals:   Vitals:    06/03/23 0630 06/03/23 0645 06/03/23 0700 06/03/23 0728   BP: 108/62 103/65 108/67 124/63   BP Location:    Left arm   Patient Position:    Lying   Pulse: 80 86 92 85   Resp:   18 22   Temp:    97.5 °F (36.4 °C)   TempSrc:    Oral   SpO2: 95% 94% 95% 95%   Weight:       Height:         Oxygen Therapy: NA    Active LDAs/IV Access:   Lines, Drains & Airways       Active LDAs       Name Placement date Placement time Site Days    Peripheral IV 06/03/23 0411 Left;Posterior Hand 06/03/23  0411  Hand  less than 1    Peripheral IV 06/03/23 0520 Right Antecubital 06/03/23  0520  Antecubital  less than 1                    Labs (abnormal labs have a star):   Labs Reviewed   COMPREHENSIVE METABOLIC PANEL - Abnormal; Notable for the following components:       Result Value    Glucose 120 (*)     BUN 24 (*)     Sodium 133 (*)     Chloride 97 (*)     Calcium 8.3 (*)     All other components within  normal limits    Narrative:     GFR Normal >60  Chronic Kidney Disease <60  Kidney Failure <15     URINALYSIS W/ MICROSCOPIC IF INDICATED (NO CULTURE) - Abnormal; Notable for the following components:    Specific Gravity, UA 1.052 (*)     Protein, UA Trace (*)     All other components within normal limits    Narrative:     Urine microscopic not indicated.   BNP (IN-HOUSE) - Abnormal; Notable for the following components:    proBNP 2,701.0 (*)     All other components within normal limits    Narrative:     Among patients with dyspnea, NT-proBNP is highly sensitive for the detection of acute congestive heart failure. In addition NT-proBNP of <300 pg/ml effectively rules out acute congestive heart failure with 99% negative predictive value.    Results may be falsely decreased if patient taking Biotin.     SINGLE HSTROPONIN T - Abnormal; Notable for the following components:    HS Troponin T 43 (*)     All other components within normal limits    Narrative:     High Sensitive Troponin T Reference Range:  <10.0 ng/L- Negative Female for AMI  <15.0 ng/L- Negative Male for AMI  >=10 - Abnormal Female indicating possible myocardial injury.  >=15 - Abnormal Male indicating possible myocardial injury.   Clinicians would have to utilize clinical acumen, EKG, Troponin, and serial changes to determine if it is an Acute Myocardial Infarction or myocardial injury due to an underlying chronic condition.        CBC WITH AUTO DIFFERENTIAL - Abnormal; Notable for the following components:    RBC 3.67 (*)     Hemoglobin 10.1 (*)     Hematocrit 32.2 (*)     MCHC 31.4 (*)     Platelets 131 (*)     Lymphocyte % 17.3 (*)     Eosinophil % 0.2 (*)     All other components within normal limits   COVID-19 AND FLU A/B, NP SWAB IN TRANSPORT MEDIA 8-12 HR TAT - Normal    Narrative:     Fact sheet for providers: https://www.fda.gov/media/633065/download    Fact sheet for patients: https://www.fda.gov/media/843765/download    Test performed by PCR.    LACTIC ACID, PLASMA - Normal   MAGNESIUM - Normal   BLOOD CULTURE   BLOOD CULTURE   RAINBOW DRAW    Narrative:     The following orders were created for panel order [place] Draw.  Procedure                               Abnormality         Status                     ---------                               -----------         ------                     Green Top (Gel)[[id number]]                                  Final result               Lavender Top[[id number]]                                     Final result               Gold Top - SST[[id number]]                                   Final result               Light Blue Top[[id number]]                                   Final result                 Please view results for these tests on the individual orders.   BLOOD GAS, ARTERIAL   PREVIOUS HISTORY   TYPE AND SCREEN   CBC AND DIFFERENTIAL    Narrative:     The following orders were created for panel order CBC & Differential.  Procedure                               Abnormality         Status                     ---------                               -----------         ------                     CBC Auto Differential[[id number]]        Abnormal            Final result                 Please view results for these tests on the individual orders.   GREEN TOP   LAVENDER TOP   GOLD TOP - SST   LIGHT BLUE TOP       Meds given in ED:   Medications   piperacillin-tazobactam (ZOSYN) 3.375 g/100 mL 0.9% NS IVPB (mbp) (3.375 g Intravenous New Bag 6/3/23 0804)   sodium chloride 0.9 % bolus 1,000 mL (0 mL Intravenous Stopped 6/3/23 0618)           NIH Stroke Scale:       Isolation/Infection(s):  No active isolations   No active infections     COVID Testing  Collected no  Resulted na    Nursing report ED to floor:  Mental status: A & O X 4, very lethargic   Ambulatory status: has not ambulated in ED, came from ARU   Precautions: fall    ED nurse phone extentsion- [phone]

## 2023-06-03 NOTE — NURSING NOTE
"Upon shift change, pt appeared sleepy, but responsive. vitals were obtained. Temperature of 102.6, pt complained of \"feeling full\", breath smelled like fecal matter, bowel sounds were hypoactive, speech became garbled at times and pt was not able to completely voice his thoughts. Pt was very flushed. Tylenol given and Dr. Alfaro called at [phone]. Ct of the head and abdomen was ordered along with a septic workup. All results were negative. Continued to monitor pt throughout the night. Pt's wife remained at bedside until 3am. Blood pressure trended lower through the night. Blood sugar remained stable. Pt then spiked another fever of 102.4 around midnight. Medicated with Tylenol. Ice packs applied, blanket removed, and air temp lowered in room. It took over an hour for the fever to respond to interventions. Temp was 103 at 0140am, recheck at 0220 read 101.8. Further temp recheck at 0250 was 100.6. Full set of vitals obtained at 0400 and revealed a blood pressure of 78/41. Pt was still responsive, but lethargic. Notified Dr. Mejia of decline and he contacted hospitalist. Sofia Villanueva, house supervisor was then made aware of the possibility that patient would be transferred. IV line started in pt's left hand. Dr. Alfaro put in orders to discharge pt from ARU and admit to ER. Pt's wife was notified of discharge. Report given to Jolene Lee in the emergency room. Pt discharged to bed 3 in the emergency room.  "

## 2023-06-03 NOTE — THERAPY RE-EVALUATION
Patient Name: Emre Lisa  : 1960    MRN: [id number]                              Today's Date: 6/3/2023       Admit Date: 6/3/2023    Visit Dx:     ICD-10-CM ICD-9-CM   1. Fever of unknown origin  R50.9 780.60   2. Other specified hypotension  I95.89 458.8   3. Impaired mobility and ADLs [Z74.09, Z78.9 (ICD-10-CM)]  Z74.09 V49.89    Z78.9      Patient Active Problem List   Diagnosis    Foot osteomyelitis, right    Nodule of groin    Type 2 diabetes mellitus with skin complication    Status post transmetatarsal amputation of right foot    Hammer toe of left foot    Hallux malleus of left foot    Cellulitis of left foot    Infected hardware in left leg    Chronic multifocal osteomyelitis of left foot    Charcot's joint of left foot    Skin ulcer of left foot, limited to breakdown of skin    Dyspnea    Syncope    Acute respiratory failure with hypoxia    Tachycardia    Neuropathy due to secondary diabetes mellitus    Atrial fibrillation    Presence of biventricular cardiac pacemaker    Hypertension    Medically complex patient    Fever of unknown origin     Past Medical History:   Diagnosis Date    Arthritis     Atrial fibrillation     Diabetes mellitus     Diabetic foot ulcer associated with type 2 diabetes mellitus     left great toe    Hallux malleus of left foot     Hammer toe     Hypertension     MI (myocardial infarction)     Neuropathy in diabetes     Onychomycosis     Presence of biventricular cardiac pacemaker     Rheumatoid arthritis      Past Surgical History:   Procedure Laterality Date    AMPUTATION DIGIT Right 2017    Procedure: RIGHT AMPUTATION TRANSMETATRASAL , RECESSION GASTROCNEMOUS;  Surgeon: Marco A Thompson DPM;  Location: [facility] OR;  Service:     ANKLE FUSION Left 2023    Procedure: REDUCTION OF LEFT ANKLE DEFORMITY WITH APPLICATION OF EXTERNAL FIXATOR;  Surgeon: Marco A Thompson DPM;  Location: [facility] OR;  Service: Podiatry;  Laterality: Left;    CARDIAC  CATHETERIZATION      CARDIAC DEFIBRILLATOR PLACEMENT  2010, 2016    CARPAL TUNNEL RELEASE  2015    elbow and wrist left arm    FOOT FUSION Left 05/15/2023    Procedure: REMOVAL OF EXTERNAL FIXATOR LEFT LOWER EXTREMITY WITH TIBIAL TALOCALCANEAL ARTHRODESIS  AND ALL INDICATED PROCEDURES;  Surgeon: Marco A Thompson DPM;  Location: [facility] OR;  Service: Podiatry;  Laterality: Left;    FOOT IRRIGATION, DEBRIDEMENT AND REPAIR Left 03/07/2018    Procedure: FOOT IRRIGATION, DEBRIDEMENT AND REPAIR;  Surgeon: Marco A Thompson DPM;  Location: [facility] OR;  Service:     FOOT IRRIGATION, DEBRIDEMENT AND REPAIR Left 03/10/2018    Procedure: FOOT IRRIGATION, DEBRIDEMENT AND REPAIR;  Surgeon: Marco A Thompson DPM;  Location: [facility] OR;  Service: Podiatry    FOOT WOUND CLOSURE Right 03/02/2017    Procedure: INCISION AND DRAINAGE, RIGHT FOOT;  Surgeon: Tung Rosenthal DPM;  Location: [facility] OR;  Service:     HAMMER TOE REPAIR Left 02/15/2018    Procedure: HAMMERTOE CORRECTION LEFT SECOND, THIRD AND FOURTH TOES AND HALLUX INTERPHALANGEAL JOINT ARTHRODESIS LEFT FOOT (Micro Asnis, 4.0 & 5.0 Asnis)      (c-arm);  Surgeon: Marco A Thompson DPM;  Location: [facility] OR;  Service:     HARDWARE REMOVAL Left 03/05/2018    Procedure: ANKLE/FOOT HARDWARE REMOVAL;  Surgeon: Marco A Thompson DPM;  Location: [facility] OR;  Service:     INCISION AND DRAINAGE LEG Left 03/05/2018    Procedure: INCISION AND DRAINAGE LOWER EXTREMITY;  Surgeon: Marco A Thompson DPM;  Location: [facility] OR;  Service:     PLANTAR FASCIA RELEASE Left 11/21/2019    Procedure: PLANTAR PLANING LEFT FOOT AND ALL OTHER INDICATED PROCEDURES;  Surgeon: Marco A Thompson DPM;  Location: [facility];  Service: Podiatry    TOE AMPUTATION Right 2016    TONSILECTOMY, ADENOIDECTOMY, BILATERAL MYRINGOTOMY AND TUBES      age 25      General Information       Row Name 06/03/23 1257          OT Time and Intention    Document Type re-evaluation  -SA     Mode of Treatment individual therapy;occupational  therapy  -       Row Name 06/03/23 1257          General Information    Patient Profile Reviewed yes  -SA     Prior Level of Function independent:;community mobility;gait;all household mobility;transfer;bed mobility;ADL's;driving;cleaning;cooking;home management  -     Existing Precautions/Restrictions fall;weight bearing;other (see comments)  WBAT LLE  -     Barriers to Rehab medically complex  -       Row Name 06/03/23 1257          Living Environment    People in Home spouse;child(ambrosio), adult  -       Row Name 06/03/23 1257          Home Main Entrance    Number of Stairs, Main Entrance four  -SA     Stair Railings, Main Entrance none  -SA       Row Name 06/03/23 1257          Stairs Within Home, Primary    Stairs, Within Home, Primary Uses knee scooter, walk in shower (very small), regular toilet, Has a FWW  -SA     Number of Stairs, Within Home, Primary two  -SA     Stair Railings, Within Home, Primary none  -SA       Row Name 06/03/23 1257          Cognition    Orientation Status (Cognition) oriented x 4  -SA       Row Name 06/03/23 1257          Safety Issues, Functional Mobility    Safety Issues Affecting Function (Mobility) ability to follow commands;at risk behavior observed;awareness of need for assistance;friction/shear risk;insight into deficits/self-awareness;judgment;positioning of assistive device;problem-solving;safety precaution awareness;safety precautions follow-through/compliance  -     Impairments Affecting Function (Mobility) balance;endurance/activity tolerance;postural/trunk control;strength;pain  -SA               User Key  (r) = Recorded By, (t) = Taken By, (c) = Cosigned By      Initials Name Provider Type    Estephania Francois OT Occupational Therapist                     Mobility/ADL's       Row Name 06/03/23 1257          Bed Mobility    Bed Mobility sit-supine;supine-sit  -SA     Rolling Left [place] (Bed Mobility) standby assist  -SA     Rolling Right [place]  (Bed Mobility) standby assist  -     Supine-Sit [place] (Bed Mobility) moderate assist (50% patient effort)  -     Sit-Supine [place] (Bed Mobility) moderate assist (50% patient effort)  -     Assistive Device (Bed Mobility) head of bed elevated;bed rails  -       Row Name 06/03/23 1257          Transfers    Transfers sit-stand transfer  -     Comment, (Transfers) sit<>stand not tested due to pt not having a dressing on his leg per RN request.  -       Row Name 06/03/23 1257          Activities of Daily Living    BADL Assessment/Intervention toileting;upper body dressing;lower body dressing;bathing  -[facility] Name 06/03/23 1257          Mobility    Extremity Weight-bearing Status left lower extremity  -     Left Lower Extremity (Weight-bearing Status) weight-bearing as tolerated (WBAT)   -[facility] Name 06/03/23 1257          Toileting Assessment/Training    [place] Level (Toileting) bridging;adjust/manage clothing;dependent (less than 25% patient effort)  -     Assistive Devices (Toileting) urinal  -     Position (Toileting) edge of bed sitting  -[facility] Name 06/03/23 1257          Upper Body Dressing Assessment/Training    [place] Level (Upper Body Dressing) doff;don;other (see comments);standby assist  \[facility]\"" gown  -     Position (Upper Body Dressing) edge of bed sitting  -       Row Name 06/03/23 1257          Lower Body Dressing Assessment/Training    [place] Level (Lower Body Dressing) doff;don;socks;dependent (less than 25% patient effort)  -     Position (Lower Body Dressing) edge of bed sitting  -       Row Name 06/03/23 1257          Bathing Assessment/Intervention    [place] Level (Bathing) upper body;standby assist;lower body;maximum assist (25% patient effort)  -     Position (Bathing) edge of bed sitting  -               User Key  (r) = Recorded By, (t) = Taken By, (c) = Cosigned By      Initials Name Provider Type    SA Arnett  GURU Best Occupational Therapist                   Obj/Interventions       [facility] Name 06/03/23 1257          Sensory Assessment (Somatosensory)    Sensory Assessment (Somatosensory) UE sensation intact  -[facility] Name 06/03/23 1257          Range of Motion Comprehensive    General Range of Motion bilateral upper extremity ROM WFL  -[facility] Name 06/03/23 1257          Strength Comprehensive (MMT)    General Manual Muscle Testing (MMT) Assessment upper extremity strength deficits identified  -     Comment, General Manual Muscle Testing (MMT) Assessment R shld flex/exten not tested d/t prior injuries; L shld flex/exten 4/5 grossly; B elbow flex/exten and  4/5 grossly.  -               User Key  (r) = Recorded By, (t) = Taken By, (c) = Cosigned By      Initials Name Provider Type     Estephania Arnett OT Occupational Therapist                   Goals/Plan       [facility] Name 06/03/23 1257          Transfer Goal 1 (OT)    Activity/Assistive Device (Transfer Goal 1, OT) bed-to-chair/chair-to-bed;wheelchair transfer;commode, bedside without drop arms;commode, bedside with drop arms;walker, rolling  -     [place] Level/Cues Needed (Transfer Goal 1, OT) modified independence  -SA     Time Frame (Transfer Goal 1, OT) long term goal (LTG);by discharge  -SA     Progress/Outcome (Transfer Goal 1, OT) goal not met  -SA       Row Name 06/03/23 1257          Bathing Goal 1 (OT)    Activity/Device (Bathing Goal 1, OT) lower body bathing  -SA     [place] Level/Cues Needed (Bathing Goal 1, OT) modified independence  -SA     Time Frame (Bathing Goal 1, OT) long term goal (LTG);by discharge  -SA     Progress/Outcomes (Bathing Goal 1, OT) goal not met  -[facility] Name 06/03/23 1257          Dressing Goal 1 (OT)    Activity/Device (Dressing Goal 1, OT) lower body dressing  -SA     [place]/Cues Needed (Dressing Goal 1, OT) modified independence  -SA     Time Frame (Dressing Goal 1, OT) long term goal (LTG);by  discharge  -SA     Progress/Outcome (Dressing Goal 1, OT) goal not met  -       Row Name 06/03/23 1257          Toileting Goal 1 (OT)    Activity/Device (Toileting Goal 1, OT) toileting skills, all  -SA     [place] Level/Cues Needed (Toileting Goal 1, OT) modified independence  -SA     Time Frame (Toileting Goal 1, OT) long term goal (LTG);by discharge  -     Progress/Outcome (Toileting Goal 1, OT) goal not met  -       Row Name 06/03/23 1257          Therapy Assessment/Plan (OT)    Planned Therapy Interventions (OT) activity tolerance training;manual therapy/joint mobilization;patient/caregiver education/training;adaptive equipment training;neuromuscular control/coordination retraining;BADL retraining;cognitive/visual perception retraining;occupation/activity based interventions;ROM/therapeutic exercise;strengthening exercise;edema control/reduction;functional balance retraining;IADL retraining;passive ROM/stretching;transfer/mobility retraining;wheelchair assessment/training  -               User Key  (r) = Recorded By, (t) = Taken By, (c) = Cosigned By      Initials Name Provider Type    SA Estephania Arnett OT Occupational Therapist                   Clinical Impression       Row Name 06/03/23 1257          Pain Assessment    Pretreatment Pain Rating 4/10  -SA     Posttreatment Pain Rating 4/10  -SA     Pain Location - head  -SA     Pain Intervention(s) Repositioned;Rest  -       Row Name 06/03/23 1257          Plan of Care Review    Plan of Care Reviewed With patient;spouse  -     Outcome Evaluation OT re-eval completed. Pt reports his fever is breaking. Pt temp on entry was 99.1 deg F. Pt pleasant and agreeable. Pt required mod A sup<>sit with increased time. Pt agreed to sit EOB to bathe and change his gown. Min A to don<>doff gown. SBA UB bathing. Max A LB bathing. Dependent to don<>doff R sock. Pt dependent to urinate in urinal seated EOB. SBA to roll L<>R in bed to change sheets. Cont  skilled IP/OT. No OT goals met.  -       Row Name 06/03/23 1257          Therapy Assessment/Plan (OT)    Patient/Family Therapy Goal Statement (OT) return to PLOF  -     Rehab Potential (OT) good, to achieve stated therapy goals  -     Criteria for Skilled Therapeutic Interventions Met (OT) yes;meets criteria  -     Therapy Frequency (OT) other (see comments)  5-7 d/wk  -     Predicted Duration of Therapy Intervention (OT) until all goals met or d/c from hospital  -       Row Name 06/03/23 1257          Therapy Plan Review/Discharge Plan (OT)    Anticipated Discharge Disposition (OT) inpatient rehabilitation facility  -       Row Name 06/03/23 1257          Vital Signs    Pre Systolic BP Rehab 108  -SA     Pre Treatment Diastolic BP 59  -SA     Pretreatment Heart Rate (beats/min) 92  -SA     Pre SpO2 (%) 91  -SA     O2 Delivery Pre Treatment room air  -SA     Pre Patient Position Supine  -       Row Name 06/03/23 1257          Positioning and Restraints    Pre-Treatment Position in bed  -SA     Post Treatment Position bed  -SA     In Bed supine;LUE elevated;call light within reach;encouraged to call for assist;exit alarm on;with family/caregiver;with other staff  -               User Key  (r) = Recorded By, (t) = Taken By, (c) = Cosigned By      Initials Name Provider Type    Estephania Francois OT Occupational Therapist                   Outcome Measures       Row Name 06/03/23 1257          How much help from another is currently needed...    Putting on and taking off regular lower body clothing? 2  -SA     Bathing (including washing, rinsing, and drying) 2  -SA     Toileting (which includes using toilet bed pan or urinal) 2  -SA     Putting on and taking off regular upper body clothing 3  -SA     Taking care of personal grooming (such as brushing teeth) 4  -SA     Eating meals 4  -SA     AM-PAC 6 Clicks Score (OT) 17  -       Row Name 06/03/23 0901          How much help from another person do  you currently need...    Turning from your back to your side while in flat bed without using bedrails? 3  -LW     Moving from lying on back to sitting on the side of a flat bed without bedrails? 3  -LW     Moving to and from a bed to a chair (including a wheelchair)? 3  -LW     Standing up from a chair using your arms (e.g., wheelchair, bedside chair)? 3  -LW     Climbing 3-5 steps with a railing? 3  -LW     To walk in hospital room? 3  -LW     AM-PAC 6 Clicks Score (PT) 18  -LW       Row Name 06/03/23 1257          Functional Assessment    Outcome Measure Options AM-PAC 6 Clicks Daily Activity (OT)  -               User Key  (r) = Recorded By, (t) = Taken By, (c) = Cosigned By      Initials Name Provider Type    Estephania Francois OT Occupational Therapist    Charley Chowdary, RN Registered Nurse                    Occupational Therapy Education       Title: PT OT SLP Therapies (In Progress)       Topic: Occupational Therapy (Done)       Point: ADL training (Done)       Description:   Instruct learner(s) on proper safety adaptation and remediation techniques during self care or transfers.   Instruct in proper use of assistive devices.                  Learning Progress Summary             Patient Acceptance, E,TB, VU by  at 6/3/2023 1503    Comment: OT POC, Role of OT, transfer training   Significant Other Acceptance, E,TB, VU by SA at 6/3/2023 1503    Comment: OT POC, Role of OT, transfer training                         Point: Precautions (Done)       Description:   Instruct learner(s) on prescribed precautions during self-care and functional transfers.                  Learning Progress Summary             Patient Acceptance, E,TB, VU by SA at 6/3/2023 1503    Comment: OT POC, Role of OT, transfer training   Significant Other Acceptance, E,TB, VU by SA at 6/3/2023 1503    Comment: OT POC, Role of OT, transfer training                         Point: Body mechanics (Done)       Description:   Instruct  learner(s) on proper positioning and spine alignment during self-care, functional mobility activities and/or exercises.                  Learning Progress Summary             Patient Acceptance, E,TB, VU by  at 6/3/2023 1503    Comment: OT POC, Role of OT, transfer training   Significant Other Acceptance, E,TB, VU by  at 6/3/2023 1503    Comment: OT POC, Role of OT, transfer training                                         User Key       Initials Effective Dates Name Provider Type Discipline     08/11/22 -  Estephania Arnett OT Occupational Therapist OT                  OT Recommendation and Plan  Planned Therapy Interventions (OT): activity tolerance training, manual therapy/joint mobilization, patient/caregiver education/training, adaptive equipment training, neuromuscular control/coordination retraining, BADL retraining, cognitive/visual perception retraining, occupation/activity based interventions, ROM/therapeutic exercise, strengthening exercise, edema control/reduction, functional balance retraining, IADL retraining, passive ROM/stretching, transfer/mobility retraining, wheelchair assessment/training  Therapy Frequency (OT): other (see comments) (5-7 d/wk)  Plan of Care Review  Plan of Care Reviewed With: patient, spouse  Outcome Evaluation: OT re-eval completed. Pt reports his fever is breaking. Pt temp on entry was 99.1 deg F. Pt pleasant and agreeable. Pt required mod A sup<>sit with increased time. Pt agreed to sit EOB to bathe and change his gown. Min A to don<>doff gown. SBA UB bathing. Max A LB bathing. Dependent to don<>doff R sock. Pt dependent to urinate in urinal seated EOB. SBA to roll L<>R in bed to change sheets. Cont skilled IP/OT. No OT goals met.     Time Calculation:    Time Calculation- OT       Row Name 06/03/23 1257             Time Calculation- OT    OT Start Time 1257  -      OT Stop Time 1356  -      OT Time Calculation (min) 59 min  -      OT Received On 06/03/23  -       OT Goal Re-Cert Due Date 06/16/23  -SA         Timed Charges    23079 - OT Self Care/Mgmt Minutes 23  -SA         Untimed Charges    OT Eval/Re-eval Minutes 36  -SA         Total Minutes    Timed Charges Total Minutes 23  -SA      Untimed Charges Total Minutes 36  -SA       Total Minutes 59  -SA                User Key  (r) = Recorded By, (t) = Taken By, (c) = Cosigned By      Initials Name Provider Type     Estephania Arnett OT Occupational Therapist                  Therapy Charges for Today       Code Description Service Date Service Provider Modifiers Qty    85330474411 HC OT SELF CARE/MGMT/TRAIN EA 15 MIN 6/3/2023 Estephania Arnett OT GO 2    [id number] HC OT RE-EVAL 2 6/3/2023 Estephania Arnett OT GO 1                 Estephania Arnett OT  6/3/2023

## 2023-06-04 LAB
ALBUMIN SERPL-MCNC: 3.2 G/DL (ref 3.5–5.2)
ALBUMIN/GLOB SERPL: 1 G/DL
ALP SERPL-CCNC: 90 U/L (ref 39–117)
ALT SERPL W P-5'-P-CCNC: 21 U/L (ref 1–41)
ANION GAP SERPL CALCULATED.3IONS-SCNC: 12 MMOL/L (ref 5–15)
AST SERPL-CCNC: 31 U/L (ref 1–40)
BASOPHILS # BLD MANUAL: 0.03 10*3/MM3 (ref 0–0.2)
BASOPHILS NFR BLD MANUAL: 1 % (ref 0–1.5)
BILIRUB SERPL-MCNC: 0.7 MG/DL (ref 0–1.2)
BUN SERPL-MCNC: 24 MG/DL (ref 8–23)
BUN/CREAT SERPL: 21.4 (ref 7–25)
CALCIUM SPEC-SCNC: 8.2 MG/DL (ref 8.6–10.5)
CHLORIDE SERPL-SCNC: 100 MMOL/L (ref 98–107)
CO2 SERPL-SCNC: 24 MMOL/L (ref 22–29)
CREAT SERPL-MCNC: 1.12 MG/DL (ref 0.76–1.27)
DEPRECATED RDW RBC AUTO: 47.8 FL (ref 37–54)
EGFRCR SERPLBLD CKD-EPI 2021: 74.7 ML/MIN/1.73
EOSINOPHIL # BLD MANUAL: 0.14 10*3/MM3 (ref 0–0.4)
EOSINOPHIL NFR BLD MANUAL: 5 % (ref 0.3–6.2)
ERYTHROCYTE [DISTWIDTH] IN BLOOD BY AUTOMATED COUNT: 15 % (ref 12.3–15.4)
GLOBULIN UR ELPH-MCNC: 3.3 GM/DL
GLUCOSE BLDC GLUCOMTR-MCNC: 100 MG/DL (ref 70–130)
GLUCOSE BLDC GLUCOMTR-MCNC: 114 MG/DL (ref 70–130)
GLUCOSE BLDC GLUCOMTR-MCNC: 118 MG/DL (ref 70–130)
GLUCOSE BLDC GLUCOMTR-MCNC: 137 MG/DL (ref 70–130)
GLUCOSE SERPL-MCNC: 105 MG/DL (ref 65–99)
HCT VFR BLD AUTO: 30.5 % (ref 37.5–51)
HGB BLD-MCNC: 9.9 G/DL (ref 13–17.7)
LYMPHOCYTES # BLD MANUAL: 0.84 10*3/MM3 (ref 0.7–3.1)
LYMPHOCYTES NFR BLD MANUAL: 16 % (ref 5–12)
MAGNESIUM SERPL-MCNC: 2.2 MG/DL (ref 1.6–2.4)
MCH RBC QN AUTO: 28.1 PG (ref 26.6–33)
MCHC RBC AUTO-ENTMCNC: 32.5 G/DL (ref 31.5–35.7)
MCV RBC AUTO: 86.6 FL (ref 79–97)
MONOCYTES # BLD: 0.44 10*3/MM3 (ref 0.1–0.9)
NEUTROPHILS # BLD AUTO: 1.25 10*3/MM3 (ref 1.7–7)
NEUTROPHILS NFR BLD MANUAL: 45 % (ref 42.7–76)
NEUTS BAND NFR BLD MANUAL: 1 % (ref 0–5)
PLASMA CELL PREC NFR BLD MANUAL: 1 % (ref 0–0)
PLATELET # BLD AUTO: 126 10*3/MM3 (ref 140–450)
PMV BLD AUTO: 10.9 FL (ref 6–12)
POTASSIUM SERPL-SCNC: 3.9 MMOL/L (ref 3.5–5.2)
PROT SERPL-MCNC: 6.5 G/DL (ref 6–8.5)
RBC # BLD AUTO: 3.52 10*6/MM3 (ref 4.14–5.8)
RBC MORPH BLD: NORMAL
SMALL PLATELETS BLD QL SMEAR: ABNORMAL
SODIUM SERPL-SCNC: 136 MMOL/L (ref 136–145)
VARIANT LYMPHS NFR BLD MANUAL: 1 % (ref 0–5)
VARIANT LYMPHS NFR BLD MANUAL: 30 % (ref 19.6–45.3)
WBC MORPH BLD: NORMAL
WBC NRBC COR # BLD: 2.72 10*3/MM3 (ref 3.4–10.8)

## 2023-06-04 PROCEDURE — G0378 HOSPITAL OBSERVATION PER HR: HCPCS

## 2023-06-04 PROCEDURE — 83735 ASSAY OF MAGNESIUM: CPT | Performed by: HOSPITALIST

## 2023-06-04 PROCEDURE — 25010000002 PIPERACILLIN SOD-TAZOBACTAM PER 1 G: Performed by: INTERNAL MEDICINE

## 2023-06-04 PROCEDURE — 85007 BL SMEAR W/DIFF WBC COUNT: CPT | Performed by: HOSPITALIST

## 2023-06-04 PROCEDURE — 85025 COMPLETE CBC W/AUTO DIFF WBC: CPT | Performed by: HOSPITALIST

## 2023-06-04 PROCEDURE — 82948 REAGENT STRIP/BLOOD GLUCOSE: CPT

## 2023-06-04 PROCEDURE — 80053 COMPREHEN METABOLIC PANEL: CPT | Performed by: HOSPITALIST

## 2023-06-04 PROCEDURE — 25010000002 FUROSEMIDE PER 20 MG: Performed by: HOSPITALIST

## 2023-06-04 RX ADMIN — FUROSEMIDE 40 MG: 10 INJECTION, SOLUTION INTRAVENOUS at 08:20

## 2023-06-04 RX ADMIN — THERA TABS 1 TABLET: TAB at 20:26

## 2023-06-04 RX ADMIN — METOPROLOL TARTRATE 25 MG: 25 TABLET, FILM COATED ORAL at 20:27

## 2023-06-04 RX ADMIN — APIXABAN 5 MG: 5 TABLET, FILM COATED ORAL at 20:34

## 2023-06-04 RX ADMIN — Medication 10 ML: at 09:54

## 2023-06-04 RX ADMIN — Medication 400 MG: at 08:20

## 2023-06-04 RX ADMIN — PIPERACILLIN AND TAZOBACTAM 4.5 G: 4; .5 INJECTION, POWDER, LYOPHILIZED, FOR SOLUTION INTRAVENOUS; PARENTERAL at 04:53

## 2023-06-04 RX ADMIN — Medication 400 MG: at 20:33

## 2023-06-04 RX ADMIN — DOCUSATE SODIUM 50 MG AND SENNOSIDES 8.6 MG 2 TABLET: 8.6; 5 TABLET, FILM COATED ORAL at 20:34

## 2023-06-04 RX ADMIN — ACETAMINOPHEN 650 MG: 325 TABLET, FILM COATED ORAL at 21:25

## 2023-06-04 RX ADMIN — DRONEDARONE 400 MG: 400 TABLET, FILM COATED ORAL at 20:34

## 2023-06-04 RX ADMIN — OXYCODONE HYDROCHLORIDE AND ACETAMINOPHEN 1000 MG: 500 TABLET ORAL at 20:26

## 2023-06-04 RX ADMIN — DOCUSATE SODIUM 50 MG AND SENNOSIDES 8.6 MG 2 TABLET: 8.6; 5 TABLET, FILM COATED ORAL at 08:20

## 2023-06-04 RX ADMIN — Medication 10 ML: at 20:37

## 2023-06-04 RX ADMIN — BISACODYL 10 MG: 10 SUPPOSITORY RECTAL at 08:21

## 2023-06-04 RX ADMIN — PIPERACILLIN AND TAZOBACTAM 4.5 G: 4; .5 INJECTION, POWDER, LYOPHILIZED, FOR SOLUTION INTRAVENOUS; PARENTERAL at 13:52

## 2023-06-04 RX ADMIN — APIXABAN 5 MG: 5 TABLET, FILM COATED ORAL at 08:20

## 2023-06-04 RX ADMIN — PIPERACILLIN AND TAZOBACTAM 4.5 G: 4; .5 INJECTION, POWDER, LYOPHILIZED, FOR SOLUTION INTRAVENOUS; PARENTERAL at 20:37

## 2023-06-04 RX ADMIN — Medication 5000 UNITS: at 20:28

## 2023-06-04 NOTE — PLAN OF CARE
Goal Outcome Evaluation:  Plan of Care Reviewed With: patient        Progress: no change  Outcome Evaluation: VSS. no fever this shift. New abx ordered. meds given per orders. pt resting between care. Cast in place

## 2023-06-04 NOTE — PLAN OF CARE
Goal Outcome Evaluation:   Constipated. Suppository given with no results at this time. No signs or symptom of fever noted. Denies pains. VSS . Will continue to monitor.

## 2023-06-04 NOTE — PROGRESS NOTES
[facility] Medicine Services  INPATIENT PROGRESS NOTE    Length of Stay: 0  Date of Admission: 6/3/2023  Primary Care Physician: Jamaal Bravo MD    Subjective   (S) Admitted for fever, low grade, from ARU  Today, he is afebrile; no pain reported; concerned about infection on his ankle    Review of Systems     All pertinent negatives and positives are as above. All other systems have been reviewed and are negative unless otherwise stated.     Prior to Admission medications    Medication Sig Start Date End Date Taking? Authorizing Provider   ascorbic acid (VITAMIN C) 1000 MG tablet Take 1 tablet by mouth Every Night. 7/19/21   Herve Cr MD   Bacillus Coagulans-Inulin (Probiotic) 1-250 BILLION-MG capsule Take 1 capsule by mouth 2 (Two) Times a Day.  Patient not taking: Reported on 5/30/2023 1/27/23   Lito Rico APRN   Blood Glucose Monitoring Suppl (ONE TOUCH ULTRA 2) w/Device kit  10/18/21   Herve Cr MD   Cholecalciferol 125 MCG (5000 UT) tablet Take 1 tablet by mouth Every Night. 7/19/21   Herve Cr MD   clindamycin (CLEOCIN) 300 MG capsule Take 1 capsule by mouth 3 (Three) Times a Day.  Patient not taking: Reported on 5/30/2023 4/5/23   Lito Rico APRN   dronedarone (MULTAQ) 400 MG tablet Take 1 tablet by mouth Every Night.    Herve Cr MD   fexofenadine (ALLEGRA) 180 MG tablet Take 1 tablet by mouth Every Night. 7/19/21   Herve Cr MD   fluticasone (FLONASE) 50 MCG/ACT nasal spray 2 sprays into the nostril(s) as directed by provider As Needed for Rhinitis.    Herve Cr MD   furosemide (Lasix) 20 MG tablet Take 1 tablet by mouth Daily.  Patient not taking: Reported on 5/30/2023 4/11/23   Lito Rico APRN   glimepiride (AMARYL) 4 MG tablet Take 1 tablet by mouth Every Night. 7/19/21   Herve Cr MD   losartan (COZAAR) 25 MG tablet Take 1 tablet by mouth Daily.  7/19/21   Herve Cr MD   magnesium oxide (MAG-OX) 400 MG tablet Take 1 tablet by mouth 2 (Two) Times a Day. 11/25/21   Herve Cr MD   metFORMIN (GLUCOPHAGE) 1000 MG tablet Take 1 tablet by mouth 2 (Two) Times a Day With Meals. 7/19/21   Herve rC MD   metoprolol succinate XL (TOPROL-XL) 25 MG 24 hr tablet Take 1 tablet by mouth 2 (Two) Times a Day. 0.5 tablet    Herve Cr MD   multivitamin (THERAGRAN) tablet tablet Take  by mouth Every Night.    Herve Cr MD   ONE TOUCH ULTRA TEST test strip USE TO TEST BLOOD SUGAR THREE TIMES A DAY 8/20/16   Herve Cr MD   Zinc 50 MG tablet Take 1 tablet by mouth Every Night.    Herve Cr MD       apixaban, 5 mg, Oral, Q12H  ascorbic acid, 1,000 mg, Oral, Nightly  vitamin D3, 5,000 Units, Oral, Nightly  dronedarone, 400 mg, Oral, Nightly  furosemide, 40 mg, Intravenous, Daily  Insulin Aspart, 0-14 Units, Subcutaneous, TID AC  losartan, 25 mg, Oral, Daily  magnesium oxide, 400 mg, Oral, BID  metoprolol tartrate, 25 mg, Oral, Q12H  multivitamin, 1 tablet, Oral, Nightly  piperacillin-tazobactam, 4.5 g, Intravenous, Q8H  senna-docusate sodium, 2 tablet, Oral, BID  sodium chloride, 10 mL, Intravenous, Q12H           Objective    Temp:  [97.7 °F (36.5 °C)-99.3 °F (37.4 °C)] 98 °F (36.7 °C)  Heart Rate:  [74-92] 74  Resp:  [18-20] 18  BP: (114-121)/(60-76) 116/71    Physical Exam  Constitutional:       Appearance: Normal appearance.   HENT:      Head: Normocephalic.      Nose: Nose normal.      Mouth/Throat:      Mouth: Mucous membranes are moist.   Eyes:      Extraocular Movements: Extraocular movements intact.   Cardiovascular:      Rate and Rhythm: Normal rate and regular rhythm.      Heart sounds: Normal heart sounds.   Pulmonary:      Effort: Pulmonary effort is normal.      Breath sounds: Normal breath sounds.   Abdominal:      General: Bowel sounds are normal.      Palpations: Abdomen is soft.    Musculoskeletal:      Cervical back: Normal range of motion and neck supple.      Comments: Left lower extremity with an Ulnar boot   Right foot with old TMA scar   Neurological:      General: No focal deficit present.      Mental Status: He is alert. Mental status is at baseline.   Psychiatric:         Behavior: Behavior normal.           Results Review:  I have reviewed the labs, radiology results, and diagnostic studies.    Laboratory Data:   Results from last 7 days   Lab Units 06/04/23  0613 06/03/23  0519 06/02/23 2024   SODIUM mmol/L 136 133* 134*   POTASSIUM mmol/L 3.9 4.0 4.1   CHLORIDE mmol/L 100 97* 98   CO2 mmol/L 24.0 25.0 22.0   BUN mg/dL 24* 24* 24*   CREATININE mg/dL 1.12 1.23 1.20   GLUCOSE mg/dL 105* 120* 125*   CALCIUM mg/dL 8.2* 8.3* 8.7   BILIRUBIN mg/dL 0.7 0.8 0.9   ALK PHOS U/L 90 100 113   ALT (SGPT) U/L 21 18 19   AST (SGOT) U/L 31 25 21   ANION GAP mmol/L 12.0 11.0 14.0     Estimated Creatinine Clearance: 110.7 mL/min (by C-G formula based on SCr of 1.12 mg/dL).  Results from last 7 days   Lab Units 06/04/23  0613 06/03/23  1017 06/03/23  0519   MAGNESIUM mg/dL 2.2 2.1 2.1         Results from last 7 days   Lab Units 06/04/23  0613 06/03/23  0519 06/02/23 2024 06/01/23  0510 05/29/23  0549   WBC 10*3/mm3 2.72* 4.81 5.68 5.71 4.93   HEMOGLOBIN g/dL 9.9* 10.1* 10.6* 11.5* 11.2*   HEMATOCRIT % 30.5* 32.2* 32.7* 36.6* 35.5*   PLATELETS 10*3/mm3 126* 131* 152 227 195           Culture Data:   Blood Culture   Date Value Ref Range Status   06/03/2023 No growth at 24 hours  Preliminary   06/03/2023 Abnormal Stain (C)  Preliminary   06/02/2023 Gram Negative Bacilli (C)  Preliminary   06/02/2023 Gram Negative Bacilli (C)  Preliminary     No results found for: URINECX  No results found for: RESPCX  No results found for: WOUNDCX  No results found for: STOOLCX  No components found for: BODYFLD    Radiology Data:   Imaging Results (Last 24 Hours)       ** No results found for the last 24 hours. **             I have reviewed the patient's current medications.     Assessment/Plan     Fever     Came from ARU     Blood cultures were taken 2 days ago and one day ago    Bacteremia by Enterobacter Cloacae     Has started on Zosyn 2/7-10; so far the only source I know where it can come from would be his left ankle; will discuss tomorrow with Dr Thompson     Pending sensitivity    Sepsis (POA)     Due to above     Continue with current antibiotics    Constipation     On bowel protocol    Chronic medical problems     Essential hypertension     Type II DM     Hx of cardiac arrhythmias        Medical Decision Making  Number and Complexity of problems:one major problem  Differential Diagnosis: Sepsis    Conditions and Status:        Condition is improving.     [facility] Data  External documents reviewed: previous medical records  My EKG interpretation: n/a  My plain film interpretation: no acute event     Discussed with: patient     Treatment Plan  See above    Care Planning  Shared decision making: his wife  Code status and discussions: full code    Disposition  Social Determinants of Health that impact treatment or disposition:none  I expect the patient to be discharged: in progress      I confirmed that the patient's Advance Care Plan is present, code status is documented, or surrogate decision maker is listed in the patient's medical record.     Andrew Brooks MD

## 2023-06-04 NOTE — SIGNIFICANT NOTE
06/04/23 1224   Rehab Time/Intention   Session Not Performed patient/family declined evaluation   Recommendation   PT - Next Appointment 06/05/23     Initial PT evaluation attempted.  Patient just received suppository, does not wish to be far from bathroom, requests therapy try back later.

## 2023-06-05 ENCOUNTER — APPOINTMENT (OUTPATIENT)
Dept: GENERAL RADIOLOGY | Facility: HOSPITAL | Age: 63
End: 2023-06-05
Payer: MEDICARE

## 2023-06-05 PROBLEM — R78.81 BACTEREMIA: Status: ACTIVE | Noted: 2023-06-05

## 2023-06-05 LAB
ALBUMIN SERPL-MCNC: 3.4 G/DL (ref 3.5–5.2)
ALBUMIN/GLOB SERPL: 0.9 G/DL
ALP SERPL-CCNC: 98 U/L (ref 39–117)
ALT SERPL W P-5'-P-CCNC: 25 U/L (ref 1–41)
ANION GAP SERPL CALCULATED.3IONS-SCNC: 13 MMOL/L (ref 5–15)
AST SERPL-CCNC: 34 U/L (ref 1–40)
BACTERIA SPEC AEROBE CULT: ABNORMAL
BASOPHILS # BLD AUTO: 0.02 10*3/MM3 (ref 0–0.2)
BASOPHILS NFR BLD AUTO: 0.6 % (ref 0–1.5)
BILIRUB SERPL-MCNC: 0.7 MG/DL (ref 0–1.2)
BUN SERPL-MCNC: 23 MG/DL (ref 8–23)
BUN/CREAT SERPL: 20.5 (ref 7–25)
CALCIUM SPEC-SCNC: 8.5 MG/DL (ref 8.6–10.5)
CHLORIDE SERPL-SCNC: 99 MMOL/L (ref 98–107)
CO2 SERPL-SCNC: 25 MMOL/L (ref 22–29)
CREAT SERPL-MCNC: 1.12 MG/DL (ref 0.76–1.27)
CRP SERPL-MCNC: 8.78 MG/DL (ref 0–0.5)
DEPRECATED RDW RBC AUTO: 46.5 FL (ref 37–54)
EGFRCR SERPLBLD CKD-EPI 2021: 74.7 ML/MIN/1.73
EOSINOPHIL # BLD AUTO: 0.15 10*3/MM3 (ref 0–0.4)
EOSINOPHIL NFR BLD AUTO: 4.7 % (ref 0.3–6.2)
ERYTHROCYTE [DISTWIDTH] IN BLOOD BY AUTOMATED COUNT: 14.6 % (ref 12.3–15.4)
ERYTHROCYTE [SEDIMENTATION RATE] IN BLOOD: 59 MM/HR (ref 0–20)
GLOBULIN UR ELPH-MCNC: 3.8 GM/DL
GLUCOSE BLDC GLUCOMTR-MCNC: 125 MG/DL (ref 70–130)
GLUCOSE BLDC GLUCOMTR-MCNC: 171 MG/DL (ref 70–130)
GLUCOSE SERPL-MCNC: 115 MG/DL (ref 65–99)
GRAM STN SPEC: ABNORMAL
HCT VFR BLD AUTO: 36.3 % (ref 37.5–51)
HGB BLD-MCNC: 11.3 G/DL (ref 13–17.7)
IMM GRANULOCYTES # BLD AUTO: 0.01 10*3/MM3 (ref 0–0.05)
IMM GRANULOCYTES NFR BLD AUTO: 0.3 % (ref 0–0.5)
ISOLATED FROM: ABNORMAL
LYMPHOCYTES # BLD AUTO: 1.03 10*3/MM3 (ref 0.7–3.1)
LYMPHOCYTES NFR BLD AUTO: 32.1 % (ref 19.6–45.3)
MAGNESIUM SERPL-MCNC: 2.4 MG/DL (ref 1.6–2.4)
MCH RBC QN AUTO: 26.9 PG (ref 26.6–33)
MCHC RBC AUTO-ENTMCNC: 31.1 G/DL (ref 31.5–35.7)
MCV RBC AUTO: 86.4 FL (ref 79–97)
MONOCYTES # BLD AUTO: 0.49 10*3/MM3 (ref 0.1–0.9)
MONOCYTES NFR BLD AUTO: 15.3 % (ref 5–12)
NEUTROPHILS NFR BLD AUTO: 1.51 10*3/MM3 (ref 1.7–7)
NEUTROPHILS NFR BLD AUTO: 47 % (ref 42.7–76)
NRBC BLD AUTO-RTO: 0 /100 WBC (ref 0–0.2)
PLATELET # BLD AUTO: 158 10*3/MM3 (ref 140–450)
PMV BLD AUTO: 10.5 FL (ref 6–12)
POTASSIUM SERPL-SCNC: 3.9 MMOL/L (ref 3.5–5.2)
PROT SERPL-MCNC: 7.2 G/DL (ref 6–8.5)
RBC # BLD AUTO: 4.2 10*6/MM3 (ref 4.14–5.8)
SODIUM SERPL-SCNC: 137 MMOL/L (ref 136–145)
WBC NRBC COR # BLD: 3.21 10*3/MM3 (ref 3.4–10.8)

## 2023-06-05 PROCEDURE — 73630 X-RAY EXAM OF FOOT: CPT

## 2023-06-05 PROCEDURE — 99024 POSTOP FOLLOW-UP VISIT: CPT | Performed by: NURSE PRACTITIONER

## 2023-06-05 PROCEDURE — 73610 X-RAY EXAM OF ANKLE: CPT

## 2023-06-05 PROCEDURE — 85025 COMPLETE CBC W/AUTO DIFF WBC: CPT | Performed by: HOSPITALIST

## 2023-06-05 PROCEDURE — 86140 C-REACTIVE PROTEIN: CPT | Performed by: NURSE PRACTITIONER

## 2023-06-05 PROCEDURE — 80053 COMPREHEN METABOLIC PANEL: CPT | Performed by: HOSPITALIST

## 2023-06-05 PROCEDURE — 85652 RBC SED RATE AUTOMATED: CPT | Performed by: NURSE PRACTITIONER

## 2023-06-05 PROCEDURE — 82948 REAGENT STRIP/BLOOD GLUCOSE: CPT

## 2023-06-05 PROCEDURE — 63710000001 INSULIN ASPART PER 5 UNITS: Performed by: HOSPITALIST

## 2023-06-05 PROCEDURE — 25010000002 PIPERACILLIN SOD-TAZOBACTAM PER 1 G: Performed by: INTERNAL MEDICINE

## 2023-06-05 PROCEDURE — 97110 THERAPEUTIC EXERCISES: CPT

## 2023-06-05 PROCEDURE — 25010000002 FUROSEMIDE PER 20 MG: Performed by: HOSPITALIST

## 2023-06-05 PROCEDURE — 97162 PT EVAL MOD COMPLEX 30 MIN: CPT

## 2023-06-05 PROCEDURE — 83735 ASSAY OF MAGNESIUM: CPT | Performed by: HOSPITALIST

## 2023-06-05 RX ORDER — LACTULOSE 10 G/15ML
20 SOLUTION ORAL ONCE
Status: COMPLETED | OUTPATIENT
Start: 2023-06-05 | End: 2023-06-05

## 2023-06-05 RX ADMIN — ACETAMINOPHEN 650 MG: 325 TABLET, FILM COATED ORAL at 06:11

## 2023-06-05 RX ADMIN — INSULIN ASPART 3 UNITS: 100 INJECTION, SOLUTION INTRAVENOUS; SUBCUTANEOUS at 11:16

## 2023-06-05 RX ADMIN — PIPERACILLIN AND TAZOBACTAM 4.5 G: 4; .5 INJECTION, POWDER, LYOPHILIZED, FOR SOLUTION INTRAVENOUS; PARENTERAL at 05:51

## 2023-06-05 RX ADMIN — PIPERACILLIN SODIUM AND TAZOBACTAM SODIUM 3.38 G: 3; .375 INJECTION, POWDER, LYOPHILIZED, FOR SOLUTION INTRAVENOUS at 21:48

## 2023-06-05 RX ADMIN — DOCUSATE SODIUM 50 MG AND SENNOSIDES 8.6 MG 2 TABLET: 8.6; 5 TABLET, FILM COATED ORAL at 08:52

## 2023-06-05 RX ADMIN — THERA TABS 1 TABLET: TAB at 20:08

## 2023-06-05 RX ADMIN — Medication 400 MG: at 20:09

## 2023-06-05 RX ADMIN — Medication 10 ML: at 08:56

## 2023-06-05 RX ADMIN — LOSARTAN POTASSIUM 25 MG: 25 TABLET, FILM COATED ORAL at 08:51

## 2023-06-05 RX ADMIN — Medication 10 ML: at 20:09

## 2023-06-05 RX ADMIN — DRONEDARONE 400 MG: 400 TABLET, FILM COATED ORAL at 20:09

## 2023-06-05 RX ADMIN — LACTULOSE 20 G: 20 SOLUTION ORAL at 13:49

## 2023-06-05 RX ADMIN — DOCUSATE SODIUM 50 MG AND SENNOSIDES 8.6 MG 2 TABLET: 8.6; 5 TABLET, FILM COATED ORAL at 20:08

## 2023-06-05 RX ADMIN — ACETAMINOPHEN 650 MG: 325 TABLET, FILM COATED ORAL at 20:08

## 2023-06-05 RX ADMIN — OXYCODONE HYDROCHLORIDE AND ACETAMINOPHEN 1000 MG: 500 TABLET ORAL at 20:09

## 2023-06-05 RX ADMIN — METOPROLOL TARTRATE 25 MG: 25 TABLET, FILM COATED ORAL at 20:09

## 2023-06-05 RX ADMIN — APIXABAN 5 MG: 5 TABLET, FILM COATED ORAL at 08:51

## 2023-06-05 RX ADMIN — APIXABAN 5 MG: 5 TABLET, FILM COATED ORAL at 20:08

## 2023-06-05 RX ADMIN — METOPROLOL TARTRATE 25 MG: 25 TABLET, FILM COATED ORAL at 08:51

## 2023-06-05 RX ADMIN — PIPERACILLIN AND TAZOBACTAM 4.5 G: 4; .5 INJECTION, POWDER, LYOPHILIZED, FOR SOLUTION INTRAVENOUS; PARENTERAL at 13:53

## 2023-06-05 RX ADMIN — FUROSEMIDE 40 MG: 10 INJECTION, SOLUTION INTRAVENOUS at 08:52

## 2023-06-05 RX ADMIN — Medication 400 MG: at 08:51

## 2023-06-05 RX ADMIN — Medication 5000 UNITS: at 20:09

## 2023-06-05 NOTE — PLAN OF CARE
Goal Outcome Evaluation:              Resting quietly in bed, AA+O X4, wife attentive at bedside, denies pain or problem, voices no new complaint or concern, no changes noted, no acute distress.

## 2023-06-05 NOTE — THERAPY EVALUATION
Patient Name: Emre Lisa  : 1960    MRN: [id number]                              Today's Date: 2023       Admit Date: 6/3/2023    Visit Dx:     ICD-10-CM ICD-9-CM   1. Fever of unknown origin  R50.9 780.60   2. Other specified hypotension  I95.89 458.8   3. Impaired mobility and ADLs [Z74.09, Z78.9 (ICD-10-CM)]  Z74.09 V49.89    Z78.9    4. Impaired functional mobility, balance, gait, and endurance [Z74.09 (ICD-10-CM)]  Z74.09 V49.89     Patient Active Problem List   Diagnosis    Foot osteomyelitis, right    Nodule of groin    Type 2 diabetes mellitus with skin complication    Status post transmetatarsal amputation of right foot    Hammer toe of left foot    Hallux malleus of left foot    Cellulitis of left foot    Infected hardware in left leg    Chronic multifocal osteomyelitis of left foot    Charcot's joint of left foot    Skin ulcer of left foot, limited to breakdown of skin    Dyspnea    Syncope    Acute respiratory failure with hypoxia    Tachycardia    Neuropathy due to secondary diabetes mellitus    Atrial fibrillation    Presence of biventricular cardiac pacemaker    Hypertension    Medically complex patient    Fever of unknown origin     Past Medical History:   Diagnosis Date    Arthritis     Atrial fibrillation     Diabetes mellitus     Diabetic foot ulcer associated with type 2 diabetes mellitus     left great toe    Hallux malleus of left foot     Hammer toe     Hypertension     MI (myocardial infarction)     Neuropathy in diabetes     Onychomycosis     Presence of biventricular cardiac pacemaker     Rheumatoid arthritis      Past Surgical History:   Procedure Laterality Date    AMPUTATION DIGIT Right 2017    Procedure: RIGHT AMPUTATION TRANSMETATRASAL , RECESSION GASTROCNEMOUS;  Surgeon: Marco A Thompson DPM;  Location: [facility];  Service:     ANKLE FUSION Left 2023    Procedure: REDUCTION OF LEFT ANKLE DEFORMITY WITH APPLICATION OF EXTERNAL FIXATOR;  Surgeon: Donna  Marco A FISCHER DPM;  Location: [facility] OR;  Service: Podiatry;  Laterality: Left;    CARDIAC CATHETERIZATION      CARDIAC DEFIBRILLATOR PLACEMENT  2010, 2016    CARPAL TUNNEL RELEASE  2015    elbow and wrist left arm    FOOT FUSION Left 05/15/2023    Procedure: REMOVAL OF EXTERNAL FIXATOR LEFT LOWER EXTREMITY WITH TIBIAL TALOCALCANEAL ARTHRODESIS  AND ALL INDICATED PROCEDURES;  Surgeon: Marco A Thompson DPM;  Location: [facility] OR;  Service: Podiatry;  Laterality: Left;    FOOT IRRIGATION, DEBRIDEMENT AND REPAIR Left 03/07/2018    Procedure: FOOT IRRIGATION, DEBRIDEMENT AND REPAIR;  Surgeon: Marco A Thompson DPM;  Location: [facility] OR;  Service:     FOOT IRRIGATION, DEBRIDEMENT AND REPAIR Left 03/10/2018    Procedure: FOOT IRRIGATION, DEBRIDEMENT AND REPAIR;  Surgeon: Marco A Thompson DPM;  Location: [facility] OR;  Service: Podiatry    FOOT WOUND CLOSURE Right 03/02/2017    Procedure: INCISION AND DRAINAGE, RIGHT FOOT;  Surgeon: Tung Rosenthal DPM;  Location: [facility] OR;  Service:     HAMMER TOE REPAIR Left 02/15/2018    Procedure: HAMMERTOE CORRECTION LEFT SECOND, THIRD AND FOURTH TOES AND HALLUX INTERPHALANGEAL JOINT ARTHRODESIS LEFT FOOT (Micro Asnis, 4.0 & 5.0 Asnis)      (c-arm);  Surgeon: Marco A Thompson DPM;  Location: [facility] OR;  Service:     HARDWARE REMOVAL Left 03/05/2018    Procedure: ANKLE/FOOT HARDWARE REMOVAL;  Surgeon: Marco A Thompson DPM;  Location: [facility] OR;  Service:     INCISION AND DRAINAGE LEG Left 03/05/2018    Procedure: INCISION AND DRAINAGE LOWER EXTREMITY;  Surgeon: Marco A Thompson DPM;  Location: [facility] OR;  Service:     PLANTAR FASCIA RELEASE Left 11/21/2019    Procedure: PLANTAR PLANING LEFT FOOT AND ALL OTHER INDICATED PROCEDURES;  Surgeon: Marco A Thompson DPM;  Location: [facility] OR;  Service: Podiatry    TOE AMPUTATION Right 2016    TONSILECTOMY, ADENOIDECTOMY, BILATERAL MYRINGOTOMY AND TUBES      age 25      General Information       Row Name 06/05/23 7366          Physical Therapy Time and  Intention    Document Type evaluation  -CZ     Mode of Treatment physical therapy  -CZ       Row Name 06/05/23 1225          General Information    Patient Profile Reviewed yes  -CZ     Prior Level of Function independent:;all household mobility  -CZ     Existing Precautions/Restrictions fall;weight bearing;other (see comments)  WBAT.  -CZ     Barriers to Rehab medically complex  -CZ       Row Name 06/05/23 1225          Living Environment    People in Home spouse;child(ambrosio), adult  -CZ       Row Name 06/05/23 1225          Home Main Entrance    Number of Stairs, Main Entrance four  -CZ     Stair Railings, Main Entrance none  -CZ       Row Name 06/05/23 1225          Stairs Within Home, Primary    Stairs, Within Home, Primary 4 deep steps to enter home, can place walker on each step. Was utilizing knee scooter. Previously (I) without an AD, has FWW.  -CZ     Number of Stairs, Within Home, Primary two  -CZ       Row Name 06/05/23 1225          Cognition    Orientation Status (Cognition) oriented x 4  -CZ       Row Name 06/05/23 1225          Safety Issues, Functional Mobility    Impairments Affecting Function (Mobility) strength;endurance/activity tolerance;sensation/sensory awareness;balance  -CZ               User Key  (r) = Recorded By, (t) = Taken By, (c) = Cosigned By      Initials Name Provider Type    CZ Lei Lopez, PT Physical Therapist                   Mobility       Row Name 06/05/23 1225          Bed Mobility    Supine-Sit [place] (Bed Mobility) supervision  -     Assistive Device (Bed Mobility) head of bed elevated;bed rails  -CZ       Row Name 06/05/23 1225          Sit-Stand Transfer    Sit-Stand [place] (Transfers) contact guard  -CZ     Assistive Device (Sit-Stand Transfers) walker, front-wheeled  -CZ       Row Name 06/05/23 1225          Gait/Stairs (Locomotion)    [place] Level (Gait) contact guard  -CZ     Assistive Device (Gait) walker, front-wheeled  -CZ     Distance in  Feet (Gait) 5'x1.  -CZ     Comment, (Gait/Stairs) R shoe, L cam boot (WBAT), FWW, able to ambulate bed to [facility].  Slight LOB posteriorly while backing, no falls, able to self correct.  -CZ       Row Name 06/05/23 1225          Mobility    Extremity Weight-bearing Status left lower extremity  -CZ     Left Lower Extremity (Weight-bearing Status) weight-bearing as tolerated (WBAT)  -CZ               User Key  (r) = Recorded By, (t) = Taken By, (c) = Cosigned By      Initials Name Provider Type    Lei Otoole, PT Physical Therapist                   Obj/Interventions       Row Name 06/05/23 1225          Range of Motion Comprehensive    General Range of Motion bilateral lower extremity ROM WFL  -CZ     Comment, General Range of Motion Except cam boot on L, TMA on R.  -CZ       Row Name 06/05/23 1225          Strength Comprehensive (MMT)    Comment, General Manual Muscle Testing (MMT) Assessment BLEs: 4/5 grossly.  -CZ       Row Name 06/05/23 1225          Sensory Assessment (Somatosensory)    Sensory Assessment (Somatosensory) bilateral LE  -CZ     Bilateral LE Sensory Assessment light touch awareness;absent  -CZ               User Key  (r) = Recorded By, (t) = Taken By, (c) = Cosigned By      Initials Name Provider Type    CZ Lei Lopez, PT Physical Therapist                   Goals/Plan       Row Name 06/05/23 1225          Bed Mobility Goal 1 (PT)    Activity/Assistive Device (Bed Mobility Goal 1, PT) sit to supine/supine to sit  -CZ     [place] Level/Cues Needed (Bed Mobility Goal 1, PT) independent  -CZ     Time Frame (Bed Mobility Goal 1, PT) by discharge  -CZ     Strategies/Barriers (Bed Mobility Goal 1, PT) HOB flat, no bed rails.  -CZ     Progress/Outcomes (Bed Mobility Goal 1, PT) goal not met  -CZ       Row Name 06/05/23 1225          Transfer Goal 1 (PT)    Activity/Assistive Device (Transfer Goal 1, PT) sit-to-stand/stand-to-sit;bed-to-chair/chair-to-bed;walker, rolling  -CZ      [place] Level/Cues Needed (Transfer Goal 1, PT) modified independence  -CZ     Time Frame (Transfer Goal 1, PT) by discharge  -CZ     Strategies/Barriers (Transfers Goal 1, PT) R orthopedic shoe, L camboot.  -CZ     Progress/Outcome (Transfer Goal 1, PT) goal not met  -CZ       Row Name 06/05/23 1225          Gait Training Goal 1 (PT)    Activity/Assistive Device (Gait Training Goal 1, PT) walker, rolling  -CZ     Distance (Gait Training Goal 1, PT) 25'x2.  -CZ     Time Frame (Gait Training Goal 1, PT) by discharge  -CZ     Strategies/Barriers (Gait Training Goal 1, PT) R orthopedic shoe, L camboot.  -CZ     Progress/Outcome (Gait Training Goal 1, PT) goal not met  -CZ       Row Name 06/05/23 1225          Therapy Assessment/Plan (PT)    Planned Therapy Interventions (PT) balance training;bed mobility training;gait training;patient/family education;transfer training;stretching;strengthening;ROM (range of motion)  -CZ               User Key  (r) = Recorded By, (t) = Taken By, (c) = Cosigned By      Initials Name Provider Type    CZ Lei Lopez, PT Physical Therapist                   Clinical Impression       Row Name 06/05/23 1225          Pain    Pretreatment Pain Rating 0/10 - no pain  -CZ     Posttreatment Pain Rating 0/10 - no pain  -CZ       Row Name 06/05/23 1225          Plan of Care Review    Plan of Care Reviewed With patient  -CZ     Outcome Evaluation Initial PT evaluation complete.  Patient is alert, very cooperative.  He requires only SPV for bed mobility, CGA with transfers and gait.  [place]1 [place] [facility] with FWW, R orthopedic shoe, L camboot,slight LOB postiorly, no fall, able to self correct.  Patient is appropriate for return to ARU for rehab upon discharge.  Goals established, continue skilled I/P PT.  -CZ       Row Name 06/05/23 1225          Therapy Assessment/Plan (PT)    Rehab Potential (PT) good, to achieve stated therapy goals  -CZ     Criteria for Skilled Interventions  Met (PT) yes;skilled treatment is necessary  -CZ     Therapy Frequency (PT) other (see comments)  5-7 days/week  -CZ       Row Name 06/05/23 1225          Vital Signs    Pre Systolic BP Rehab 112  -CZ     Pre Treatment Diastolic BP 70  -CZ     Pretreatment Heart Rate (beats/min) 84  -CZ     Pre SpO2 (%) 93  -CZ     O2 Delivery Pre Treatment room air  -CZ     Pre Patient Position Supine  -CZ       Row Name 06/05/23 1225          Positioning and Restraints    Pre-Treatment Position in bed  -CZ     Post Treatment Position bed  -CZ     In Bed sitting EOB;with nsg  -CZ               User Key  (r) = Recorded By, (t) = Taken By, (c) = Cosigned By      Initials Name Provider Type    Lei Otoole, PT Physical Therapist                   Outcome Measures       Row Name 06/05/23 1225 06/05/23 0800       How much help from another person do you currently need...    Turning from your back to your side while in flat bed without using bedrails? 3  -CZ 2  -SN    Moving from lying on back to sitting on the side of a flat bed without bedrails? 3  -CZ 2  -SN    Moving to and from a bed to a chair (including a wheelchair)? 3  -CZ 2  -SN    Standing up from a chair using your arms (e.g., wheelchair, bedside chair)? 3  -CZ 2  -SN    Climbing 3-5 steps with a railing? 2  -CZ 2  -SN    To walk in hospital room? 3  -CZ 2  -SN    AM-PAC 6 Clicks Score (PT) 17  -CZ 12  -SN              User Key  (r) = Recorded By, (t) = Taken By, (c) = Cosigned By      Initials Name Provider Type    Lei Otoole, PT Physical Therapist    Derrick Castañeda RN Registered Nurse                                 Physical Therapy Education       Title: PT OT SLP Therapies (In Progress)       Topic: Physical Therapy (Not Started)       Point: Mobility training (Done)       Learning Progress Summary             Patient Acceptance, E VU by TOREY at 6/5/2023 [id number]    Comment: PT POC, hand placement with transfers, bariatric FWW, rehab process.                          Point: Home exercise program (Not Started)       Learner Progress:  Not documented in this visit.              Point: Body mechanics (Not Started)       Learner Progress:  Not documented in this visit.                              User Key       Initials Effective Dates Name Provider Type Discipline    CZ 09/18/22 -  Lei Lopez, PT Physical Therapist PT                  PT Recommendation and Plan  Planned Therapy Interventions (PT): balance training, bed mobility training, gait training, patient/family education, transfer training, stretching, strengthening, ROM (range of motion)  Plan of Care Reviewed With: patient  Outcome Evaluation: Initial PT evaluation complete.  Patient is alert, very cooperative.  He requires only SPV for bed mobility, CGA with transfers and gait.  Patient ambulates 5'x1 to [facility], with FWW, R orthopedic shoe, L camboot,slight LOB postiorly, no fall, able to self correct.  Patient is appropriate for return to ARU for rehab upon discharge.  Goals established, continue skilled I/P PT.     Time Calculation:    PT Charges       Row Name 06/05/23 1314             Time Calculation    Start Time 1225  -CZ      Stop Time 1312  -CZ      Time Calculation (min) 47 min  -CZ      PT Received On 06/05/23  -CZ      PT Goal Re-Cert Due Date 06/18/23  -CZ         Untimed Charges    PT E-Stim Unattended Minutes 47  -CZ         Total Minutes    Untimed Charges Total Minutes 47  -CZ       Total Minutes 47  -CZ                User Key  (r) = Recorded By, (t) = Taken By, (c) = Cosigned By      Initials Name Provider Type    CZ Lei Lopez, PT Physical Therapist                  Therapy Charges for Today       Code Description Service Date Service Provider Modifiers Qty    [id number] HC PT EVAL MOD COMPLEXITY 3 6/5/2023 Lei Lopez, PT GP 1            PT G-Codes  Outcome Measure Options: AM-PAC 6 Clicks Daily Activity (OT)  AM-PAC 6 Clicks Score (PT): 17  AM-PAC 6 Clicks Score (OT): 17  PT  Discharge Summary  Anticipated Discharge Disposition (PT): inpatient rehabilitation facility    Lei Lopez, PT  6/5/2023

## 2023-06-05 NOTE — PROGRESS NOTES
[facility]  INPATIENT WOUND & OSTOMY CARE    PROGRESS NOTE    Today's Date: 06/05/23    Patient Name: Emre Lisa  MRN: [id number]  CSN: [id number]  PCP: Jamaal Bravo MD  Referring Provider: Javi Alfaro MD  Attending Provider: Andrew Brooks MD  Length of Stay: 0    SUBJECTIVE   Chief Complaint: left lower extremity wound    HPI: Patient was recently transferred from ARU to floor with fever of unknown origin.   He has wound to left lateral foot measuring 2.5 cm x 2 cm with granulation tissue noted. Patient also has open wound to left lower extremity measuring 3.5 cm x 4 cm with granulation tissue noted. Patient also has stage II pressure injury to coccyx. Wound bed granulated. Wounds present on admission.      Visit Dx:    ICD-10-CM ICD-9-CM   1. Fever of unknown origin  R50.9 780.60   2. Other specified hypotension  I95.89 458.8   3. Impaired mobility and ADLs [Z74.09, Z78.9 (ICD-10-CM)]  Z74.09 V49.89    Z78.9        Hospital Problem List:     Fever of unknown origin      History:   Past Medical History:   Diagnosis Date    Arthritis     Atrial fibrillation     Diabetes mellitus     Diabetic foot ulcer associated with type 2 diabetes mellitus     left great toe    Hallux malleus of left foot     Hammer toe     Hypertension     MI (myocardial infarction)     Neuropathy in diabetes     Onychomycosis     Presence of biventricular cardiac pacemaker     Rheumatoid arthritis      Past Surgical History:   Procedure Laterality Date    AMPUTATION DIGIT Right 03/05/2017    Procedure: RIGHT AMPUTATION TRANSMETATRASAL , RECESSION GASTROCNEMOUS;  Surgeon: Marco A Thompson DPM;  Location: [facility] OR;  Service:     ANKLE FUSION Left 04/13/2023    Procedure: REDUCTION OF LEFT ANKLE DEFORMITY WITH APPLICATION OF EXTERNAL FIXATOR;  Surgeon: Marco A Thompson DPM;  Location: [facility] OR;  Service: Podiatry;  Laterality: Left;    CARDIAC CATHETERIZATION      CARDIAC DEFIBRILLATOR PLACEMENT   2010, 2016    CARPAL TUNNEL RELEASE  2015    elbow and wrist left arm    FOOT FUSION Left 05/15/2023    Procedure: REMOVAL OF EXTERNAL FIXATOR LEFT LOWER EXTREMITY WITH TIBIAL TALOCALCANEAL ARTHRODESIS  AND ALL INDICATED PROCEDURES;  Surgeon: Marco A Thompson DPM;  Location: [facility] OR;  Service: Podiatry;  Laterality: Left;    FOOT IRRIGATION, DEBRIDEMENT AND REPAIR Left 03/07/2018    Procedure: FOOT IRRIGATION, DEBRIDEMENT AND REPAIR;  Surgeon: Marco A Thompson DPM;  Location: [facility] OR;  Service:     FOOT IRRIGATION, DEBRIDEMENT AND REPAIR Left 03/10/2018    Procedure: FOOT IRRIGATION, DEBRIDEMENT AND REPAIR;  Surgeon: Marco A Thompson DPM;  Location: [facility] OR;  Service: Podiatry    FOOT WOUND CLOSURE Right 03/02/2017    Procedure: INCISION AND DRAINAGE, RIGHT FOOT;  Surgeon: Tung Rosenthal DPM;  Location: [facility] OR;  Service:     HAMMER TOE REPAIR Left 02/15/2018    Procedure: HAMMERTOE CORRECTION LEFT SECOND, THIRD AND FOURTH TOES AND HALLUX INTERPHALANGEAL JOINT ARTHRODESIS LEFT FOOT (Micro Asnis, 4.0 & 5.0 Asnis)      (c-arm);  Surgeon: Marco A Thompson DPM;  Location: [facility] OR;  Service:     HARDWARE REMOVAL Left 03/05/2018    Procedure: ANKLE/FOOT HARDWARE REMOVAL;  Surgeon: Marco A Thompson DPM;  Location: [facility] OR;  Service:     INCISION AND DRAINAGE LEG Left 03/05/2018    Procedure: INCISION AND DRAINAGE LOWER EXTREMITY;  Surgeon: Marco A Thompson DPM;  Location: [facility] OR;  Service:     PLANTAR FASCIA RELEASE Left 11/21/2019    Procedure: PLANTAR PLANING LEFT FOOT AND ALL OTHER INDICATED PROCEDURES;  Surgeon: Marco A Thompson DPM;  Location: [facility] OR;  Service: Podiatry    TOE AMPUTATION Right 2016    TONSILECTOMY, ADENOIDECTOMY, BILATERAL MYRINGOTOMY AND TUBES      age 25     Social History     Socioeconomic History    Marital status:    Tobacco Use    Smoking status: Never    Smokeless tobacco: Never   Vaping Use    Vaping Use: Never used   Substance and Sexual Activity    Alcohol use: Yes      Comment: social, once every three months    Drug use: No    Sexual activity: Defer       Allergies:  Allergies   Allergen Reactions    Heparin Other (See Comments)     Heparin induce thrombocytopenia     Januvia [Sitagliptin] Hives    Lipitor [Atorvastatin] Other (See Comments)     Muscle Cramps...    Shellfish Allergy Swelling and Other (See Comments)     Age 13 this occurred doesn't remember what he ate swelled lips and face    Sulfa Antibiotics Rash     Pt was age of 4 when he was told by his family he had a reaction, pt is unsure        Medications:    Current Facility-Administered Medications:     acetaminophen (TYLENOL) tablet 650 mg, 650 mg, Oral, Q6H PRN, Behroozi, Saeid, MD, 650 mg at 06/05/23 0611    apixaban (ELIQUIS) tablet 5 mg, 5 mg, Oral, Q12H, John Gutierrez DO, 5 mg at 06/05/23 0851    ascorbic acid (VITAMIN C) tablet 1,000 mg, 1,000 mg, Oral, Nightly, John Gutierrez DO, 1,000 mg at 06/04/23 2026    sennosides-docusate (PERICOLACE) 8.6-50 MG per tablet 2 tablet, 2 tablet, Oral, BID, 2 tablet at 06/05/23 0852 **AND** polyethylene glycol (MIRALAX) packet 17 g, 17 g, Oral, Daily PRN **AND** bisacodyl (DULCOLAX) EC tablet 5 mg, 5 mg, Oral, Daily PRN **AND** bisacodyl (DULCOLAX) suppository 10 mg, 10 mg, Rectal, Daily PRN, John Gutierrez DO, 10 mg at 06/04/23 0821    calcium carbonate (TUMS) chewable tablet 500 mg (200 mg elemental), 2 tablet, Oral, BID PRN, John Gutierrez DO    Calcium Replacement - Follow Nurse / BPA Driven Protocol, , Does not apply, PRN, John Gutierrez DO    cholecalciferol (VITAMIN D3) tablet 5,000 Units, 5,000 Units, Oral, Nightly, John Gutierrez DO, 5,000 Units at 06/04/23 2028    dextrose (D50W) (25 g/50 mL) IV injection 25 g, 25 g, Intravenous, Q15 Min PRN, John Gutierrez DO    dextrose (GLUTOSE) oral gel 15 g, 15 g, Oral, Q15 Min PRN, John Gutierrez DO    dronedarone (MULTAQ) tablet 400 mg, 400 mg, Oral, Nightly, Brenda  John HERNANDEZ DO, 400 mg at 06/04/23 2034    furosemide (LASIX) injection 40 mg, 40 mg, Intravenous, Daily, John Gutierrez DO, 40 mg at 06/05/23 0852    glucagon (human recombinant) (GLUCAGEN DIAGNOSTIC) injection 1 mg, 1 mg, Intramuscular, Q15 Min PRN, John Gutierrez DO    HYDROcodone-acetaminophen (NORCO) 5-325 MG per tablet 1 tablet, 1 tablet, Oral, Q4H PRN, John Gutierrez DO    Insulin Aspart (novoLOG) injection 0-14 Units, 0-14 Units, Subcutaneous, TID AC, John Gutierrez DO    LORazepam (ATIVAN) tablet 0.5 mg, 0.5 mg, Oral, Q8H PRN, John Gutierrez DO    losartan (COZAAR) tablet 25 mg, 25 mg, Oral, Daily, John Gutierrez DO, 25 mg at 06/05/23 0851    magnesium oxide (MAG-OX) tablet 400 mg, 400 mg, Oral, BID, John Gutierrez DO, 400 mg at 06/05/23 0851    Magnesium Standard Dose Replacement - Follow Nurse / BPA Driven Protocol, , Does not apply, PRN, John Gutierrez DO    metoprolol tartrate (LOPRESSOR) tablet 25 mg, 25 mg, Oral, Q12H, John Gutierrez DO, 25 mg at 06/05/23 0851    multivitamin (THERAGRAN) tablet 1 tablet, 1 tablet, Oral, Nightly, John Gutierrez DO, 1 tablet at 06/04/23 2026    nitroglycerin (NITROSTAT) SL tablet 0.4 mg, 0.4 mg, Sublingual, Q5 Min PRN, John Gutierrez DO    ondansetron (ZOFRAN) tablet 4 mg, 4 mg, Oral, Q6H PRN **OR** ondansetron (ZOFRAN) injection 4 mg, 4 mg, Intravenous, Q6H PRN, John Gutierrez DO, 4 mg at 06/03/23 1927    Phosphorus Replacement - Follow Nurse / BPA Driven Protocol, , Does not apply, PRN, John Gutierrez DO    piperacillin-tazobactam (ZOSYN) 4.5 g/100 mL 0.9% NS IVPB (mbp), 4.5 g, Intravenous, Q8H, Behroozi, Saeid, MD, Last Rate: 0 mL/hr at 06/05/23 0050, 4.5 g at 06/05/23 0551    Potassium Replacement - Follow Nurse / BPA Driven Protocol, , Does not apply, PRN, John Gutierrez DO    sodium chloride 0.9 % flush 10 mL, 10 mL, Intravenous, Q12H, John Gutierrez DO, 10 mL at 06/05/23  0856    sodium chloride 0.9 % flush 10 mL, 10 mL, Intravenous, PRN, Brenda, John R, DO    sodium chloride 0.9 % infusion 40 mL, 40 mL, Intravenous, PRN, Brenda, John R, DO    Review of Systems:    Review of Systems      OBJECTIVE     Vitals:    06/05/23 0725   BP: 127/83   Pulse: 81   Resp: 18   Temp: 97.7 °F (36.5 °C)   SpO2: 97%       PHYSICAL EXAM  Pt presents .  Physical Exam  Physical Exam  Vitals reviewed. Nursing notes reviewed.  Constitutional:       Appearance: Well-developed.   Skin:     General: Skin is warm and dry.      Coloration: Skin is not pale.      Findings: No erythema or rash. Multiple left lower extremity wounds  Neurological:      Mental Status: Pt is alert and oriented to person, place, and time.   Psychiatric:         Behavior: Behavior normal.         Thought Content: Thought content normal.         Judgment: Judgment normal.       Results Review:  Lab Results (last 48 hours)       Procedure Component Value Units Date/Time    POC Glucose Once [[id number]]  (Abnormal) Collected: 05/31/23 1114    Specimen: Blood Updated: 05/31/23 1127     Glucose 158 mg/dL      Comment: RN NotifiedOperator: [id number] [facility] ID: [id number]       POC Glucose Once [[id number]]  (Abnormal) Collected: 05/31/23 0632    Specimen: Blood Updated: 05/31/23 0644     Glucose 163 mg/dL      Comment: : [id number] LEONEL VALERIOMeter ID: [id number]             Imaging Results (Last 72 Hours)       Procedure Component Value Units Date/Time    XR Chest 1 View [[id number]] Collected: 06/03/23 0517     Updated: 06/03/23 0650    Narrative:      INDICATION:  hypotension    COMPARISON:  4/25/2023      Impression:      FINDINGS/IMPRESSION:  No consolidative pulmonary opacity, pleural effusion, or pneumothorax.  The heart is enlarged.  Left chest ICD projects in stable position.      CT Angiogram Chest [[id number]] Collected: 06/03/23 0613     Updated: 06/03/23 0650    Narrative:       INDICATION:  hypotension    COMPARISON:  4/26/2023    TECHNIQUE:  Volumetric CT scan of the chest, from the thoracic inlet to the level of the  diaphragms, with intravenous contrast. 2D axial, sagittal, and coronal reformats  were performed. MIPS were performed on a separate workstation and reviewed.    FINDINGS:  Lungs: No focal consolidation or mass.  Pleura: No effusion or pneumothorax.  Heart: Mildly enlarged.  No evidence of acute right heart strain.  Vessels: Thoracic aorta is normal in caliber.  Bilateral pulmonary emboli are  again present, involving lobar and segmental branches, overall burden decreased  from prior examination.  Bones: No suspicious lesions.  Visualized upper abdomen: Unremarkable.      Impression:      Bilateral pulmonary emboli are again present, overall burden decreased from  prior examination.                 ASSESSMENT/PLAN       Examination and evaluation of wound(s) was performed.    DIAGNOSIS:     Edema  Unspecified open wound, left lower extremity  Charcot ankle  Stage II pressure injury, coccyx      PLAN:     Left leg wound looks ok. Dressing changed.   Offload to prevent further breakdown. Can use zinc guard.  Call with questions.    Discussed findings and treatment plan including risks, benefits, and treatment options with patient in detail. Patient agreed with treatment plan.        This document has been electronically signed by WALTER Duncan on June 5, 2023 09:55 CDT

## 2023-06-05 NOTE — THERAPY TREATMENT NOTE
Patient Name: Emre Lisa  : 1960    MRN: [id number]                              Today's Date: 2023       Admit Date: 6/3/2023    Visit Dx:     ICD-10-CM ICD-9-CM   1. Fever of unknown origin  R50.9 780.60   2. Other specified hypotension  I95.89 458.8   3. Impaired mobility and ADLs [Z74.09, Z78.9 (ICD-10-CM)]  Z74.09 V49.89    Z78.9    4. Impaired functional mobility, balance, gait, and endurance [Z74.09 (ICD-10-CM)]  Z74.09 V49.89     Patient Active Problem List   Diagnosis    Foot osteomyelitis, right    Nodule of groin    Type 2 diabetes mellitus with skin complication    Status post transmetatarsal amputation of right foot    Hammer toe of left foot    Hallux malleus of left foot    Cellulitis of left foot    Infected hardware in left leg    Chronic multifocal osteomyelitis of left foot    Charcot's joint of left foot    Skin ulcer of left foot, limited to breakdown of skin    Dyspnea    Syncope    Acute respiratory failure with hypoxia    Tachycardia    Neuropathy due to secondary diabetes mellitus    Atrial fibrillation    Presence of biventricular cardiac pacemaker    Hypertension    Medically complex patient    Fever of unknown origin     Past Medical History:   Diagnosis Date    Arthritis     Atrial fibrillation     Diabetes mellitus     Diabetic foot ulcer associated with type 2 diabetes mellitus     left great toe    Hallux malleus of left foot     Hammer toe     Hypertension     MI (myocardial infarction)     Neuropathy in diabetes     Onychomycosis     Presence of biventricular cardiac pacemaker     Rheumatoid arthritis      Past Surgical History:   Procedure Laterality Date    AMPUTATION DIGIT Right 2017    Procedure: RIGHT AMPUTATION TRANSMETATRASAL , RECESSION GASTROCNEMOUS;  Surgeon: Marco A Thompson DPM;  Location: [facility];  Service:     ANKLE FUSION Left 2023    Procedure: REDUCTION OF LEFT ANKLE DEFORMITY WITH APPLICATION OF EXTERNAL FIXATOR;  Surgeon: Donna  Marco A FISCHER DPM;  Location: [facility] OR;  Service: Podiatry;  Laterality: Left;    CARDIAC CATHETERIZATION      CARDIAC DEFIBRILLATOR PLACEMENT  2010, 2016    CARPAL TUNNEL RELEASE  2015    elbow and wrist left arm    FOOT FUSION Left 05/15/2023    Procedure: REMOVAL OF EXTERNAL FIXATOR LEFT LOWER EXTREMITY WITH TIBIAL TALOCALCANEAL ARTHRODESIS  AND ALL INDICATED PROCEDURES;  Surgeon: Marco A Thompson DPM;  Location: [facility] OR;  Service: Podiatry;  Laterality: Left;    FOOT IRRIGATION, DEBRIDEMENT AND REPAIR Left 03/07/2018    Procedure: FOOT IRRIGATION, DEBRIDEMENT AND REPAIR;  Surgeon: Marco A Thompson DPM;  Location: [facility] OR;  Service:     FOOT IRRIGATION, DEBRIDEMENT AND REPAIR Left 03/10/2018    Procedure: FOOT IRRIGATION, DEBRIDEMENT AND REPAIR;  Surgeon: Marco A Thompson DPM;  Location: [facility] OR;  Service: Podiatry    FOOT WOUND CLOSURE Right 03/02/2017    Procedure: INCISION AND DRAINAGE, RIGHT FOOT;  Surgeon: Tung Rosenthal DPM;  Location: [facility] OR;  Service:     HAMMER TOE REPAIR Left 02/15/2018    Procedure: HAMMERTOE CORRECTION LEFT SECOND, THIRD AND FOURTH TOES AND HALLUX INTERPHALANGEAL JOINT ARTHRODESIS LEFT FOOT (Micro Asnis, 4.0 & 5.0 Asnis)      (c-arm);  Surgeon: Marco A Thompson DPM;  Location: [facility] OR;  Service:     HARDWARE REMOVAL Left 03/05/2018    Procedure: ANKLE/FOOT HARDWARE REMOVAL;  Surgeon: Marco A Thompson DPM;  Location: [facility] OR;  Service:     INCISION AND DRAINAGE LEG Left 03/05/2018    Procedure: INCISION AND DRAINAGE LOWER EXTREMITY;  Surgeon: Marco A Thompson DPM;  Location: [facility] OR;  Service:     PLANTAR FASCIA RELEASE Left 11/21/2019    Procedure: PLANTAR PLANING LEFT FOOT AND ALL OTHER INDICATED PROCEDURES;  Surgeon: Marco A Thompson DPM;  Location: [facility] OR;  Service: Podiatry    TOE AMPUTATION Right 2016    TONSILECTOMY, ADENOIDECTOMY, BILATERAL MYRINGOTOMY AND TUBES      age 25      General Information       Row Name 06/05/23 9982          OT Time and Intention     Document Type therapy note (daily note)  -     Mode of Treatment individual therapy;occupational therapy  -       Row Name 06/05/23 1420          General Information    Patient Profile Reviewed yes  -     Existing Precautions/Restrictions fall;weight bearing;other (see comments)  -       Row Name 06/05/23 1420          Cognition    Orientation Status (Cognition) oriented x 4  -       Row Name 06/05/23 1420          Safety Issues, Functional Mobility    Impairments Affecting Function (Mobility) strength;endurance/activity tolerance;sensation/sensory awareness;balance  -               User Key  (r) = Recorded By, (t) = Taken By, (c) = Cosigned By      Initials Name Provider Type     Kim Fernandez COTA Occupational Therapist Assistant                     Mobility/ADL's    No documentation.                  Obj/Interventions       Row Name 06/05/23 1420          Shoulder (Therapeutic Exercise)    Shoulder (Therapeutic Exercise) AROM (active range of motion)  -     Shoulder AROM (Therapeutic Exercise) flexion;extension;aBduction;aDduction;external rotation;internal rotation;horizontal aBduction/aDduction;bilateral  -       Row Name 06/05/23 1420          Elbow/Forearm (Therapeutic Exercise)    Elbow/Forearm (Therapeutic Exercise) AROM (active range of motion)  -     Elbow/Forearm AROM (Therapeutic Exercise) bilateral;flexion;extension;supination;pronation  -       Row Name 06/05/23 1420          Wrist (Therapeutic Exercise)    Wrist (Therapeutic Exercise) AROM (active range of motion)  -     Wrist AROM (Therapeutic Exercise) bilateral;flexion;extension  -       Row Name 06/05/23 1420          Hand (Therapeutic Exercise)    Hand (Therapeutic Exercise) AROM (active range of motion)  -     Hand AROM/AAROM (Therapeutic Exercise) bilateral;finger flexion;finger extension  -       Row Name 06/05/23 1420          Motor Skills    Therapeutic Exercise shoulder;elbow/forearm;wrist;hand  -                User Key  (r) = Recorded By, (t) = Taken By, (c) = Cosigned By      Initials Name Provider Type    Kim Horowitz COTA Occupational Therapist Assistant                   Goals/Plan    No documentation.                  Clinical Impression       Row Name 06/05/23 1420          Pain Assessment    Pretreatment Pain Rating 0/10 - no pain  -KD     Posttreatment Pain Rating 0/10 - no pain  -KD       Row Name 06/05/23 1420          Plan of Care Review    Plan of Care Reviewed With patient  -KD     Progress improving  -KD     Outcome Evaluation OT tx completed this date. Pt agreeeable to bed ex only @ this time. Pt completed BUE thjer ex in all planes w/ 2lb HW and fair vicente w/ RB's PRN. Pt unable to perform Shld flex/ext on RUE. No goals met this date. Cont OT POC.  -KD       Row Name 06/05/23 1420          Therapy Assessment/Plan (OT)    Therapy Frequency (OT) other (see comments)  5-7 days a week  -KD               User Key  (r) = Recorded By, (t) = Taken By, (c) = Cosigned By      Initials Name Provider Type    Kim Horowitz COTA Occupational Therapist Assistant                   Outcome Measures       Row Name 06/05/23 1512          How much help from another is currently needed...    Putting on and taking off regular lower body clothing? 2  -KD     Bathing (including washing, rinsing, and drying) 2  -KD     Toileting (which includes using toilet bed pan or urinal) 2  -KD     Putting on and taking off regular upper body clothing 3  -KD     Taking care of personal grooming (such as brushing teeth) 4  -KD     Eating meals 4  -KD     AM-PAC 6 Clicks Score (OT) 17  -KD       Row Name 06/05/23 1225 06/05/23 0800       How much help from another person do you currently need...    Turning from your back to your side while in flat bed without using bedrails? 3  -CZ 2  -SN    Moving from lying on back to sitting on the side of a flat bed without bedrails? 3  -CZ 2  -SN    Moving to and from a bed to a chair  (including a wheelchair)? 3  -CZ 2  -SN    Standing up from a chair using your arms (e.g., wheelchair, bedside chair)? 3  -CZ 2  -SN    Climbing 3-5 steps with a railing? 2  -CZ 2  -SN    To walk in hospital room? 3  -CZ 2  -SN    AM-PAC 6 Clicks Score (PT) 17  -CZ 12  -SN              User Key  (r) = Recorded By, (t) = Taken By, (c) = Cosigned By      Initials Name Provider Type    Kim Horowitz COTA Occupational Therapist Assistant    Lei Otoole, PT Physical Therapist    Derrick Castañeda RN Registered Nurse                    Occupational Therapy Education       Title: PT OT SLP Therapies (In Progress)       Topic: Occupational Therapy (Done)       Point: ADL training (Done)       Description:   Instruct learner(s) on proper safety adaptation and remediation techniques during self care or transfers.   Instruct in proper use of assistive devices.                  Learning Progress Summary             Patient Acceptance, E,TB, VU by  at 6/3/2023 1503    Comment: OT POC, Role of OT, transfer training   Significant Other Acceptance, E,TB, VU by SA at 6/3/2023 1503    Comment: OT POC, Role of OT, transfer training                         Point: Precautions (Done)       Description:   Instruct learner(s) on prescribed precautions during self-care and functional transfers.                  Learning Progress Summary             Patient Acceptance, E,TB, VU by SA at 6/3/2023 1503    Comment: OT POC, Role of OT, transfer training   Significant Other Acceptance, E,TB, VU by SA at 6/3/2023 1503    Comment: OT POC, Role of OT, transfer training                         Point: Body mechanics (Done)       Description:   Instruct learner(s) on proper positioning and spine alignment during self-care, functional mobility activities and/or exercises.                  Learning Progress Summary             Patient Acceptance, E,TB, VU by SA at 6/3/2023 1503    Comment: OT POC, Role of OT, transfer training    Significant Other Acceptance, E,TB, VU by  at 6/3/2023 1503    Comment: OT POC, Role of OT, transfer training                                         User Key       Initials Effective Dates Name Provider Type Discipline     08/11/22 -  Estephania Arnett OT Occupational Therapist OT                  OT Recommendation and Plan  Therapy Frequency (OT): other (see comments) (5-7 days a week)  Plan of Care Review  Plan of Care Reviewed With: patient  Progress: improving  Outcome Evaluation: OT tx completed this date. Pt agreeeable to bed ex only @ this time. Pt completed BUE thjer ex in all planes w/ 2lb HW and fair vicente w/ RB's PRN. Pt unable to perform Shld flex/ext on RUE. No goals met this date. Cont OT POC.     Time Calculation:    Time Calculation- OT       Row Name 06/05/23 1420             Time Calculation- OT    OT Start Time 1420  -KD      OT Stop Time 1435  -KD      OT Time Calculation (min) 15 min  -KD      Total Timed Code Minutes- OT 15 minute(s)  -KD      OT Received On 06/05/23  -KD         Timed Charges    43668 - OT Therapeutic Exercise Minutes 15  -KD         Total Minutes    Timed Charges Total Minutes 15  -KD       Total Minutes 15  -KD                User Key  (r) = Recorded By, (t) = Taken By, (c) = Cosigned By      Initials Name Provider Type     Kim Fernandez COTA Occupational Therapist Assistant                  Therapy Charges for Today       Code Description Service Date Service Provider Modifiers Qty    [id number] HC OT THER PROC EA 15 MIN 6/5/2023 Kim Fernandez COTA GO 1                 ZIGGY Morse  6/5/2023

## 2023-06-05 NOTE — PLAN OF CARE
Goal Outcome Evaluation:  Plan of Care Reviewed With: patient           Outcome Evaluation: VSS. Pt resting well between care. Pt did not have BM this shift. No new complaints.

## 2023-06-05 NOTE — PLAN OF CARE
Goal Outcome Evaluation:  Plan of Care Reviewed With: patient        Progress: improving  Outcome Evaluation: OT tx completed this date. Pt agreeeable to bed ex only @ this time. Pt completed BUE thjer ex in all planes w/ 2lb HW and fair vicente w/ RB's PRN. Pt unable to perform Shld flex/ext on RUE. No goals met this date. Cont OT POC.

## 2023-06-05 NOTE — PLAN OF CARE
Goal Outcome Evaluation:  Plan of Care Reviewed With: patient           Outcome Evaluation: Initial PT evaluation complete.  Patient is alert, very cooperative.  He requires only SPV for bed mobility, CGA with transfers and gait.  Patient ambulates 5'x1 to [facility], with FWW, R orthopedic shoe, L camboot,slight LOB postiorly, no fall, able to self correct.  Patient is appropriate for return to ARU for rehab upon discharge.  Goals established, continue skilled I/P PT.

## 2023-06-05 NOTE — PROGRESS NOTES
Podiatric Surgery Progress Note    Subjective     Pt resting comfortably in bed. No pedal complaints.    Objective     Vital signs in last 24 hours:  Temp:  [97.5 °F (36.4 °C)-98.2 °F (36.8 °C)] 98.2 °F (36.8 °C)  Heart Rate:  [73-92] 86  Resp:  [18] 18  BP: (112-130)/(69-83) 121/69    General: alert, appears stated age, and cooperative   Neurovascular: Capillary refill: Normal   Wound: Wound bed granular with no periwound evidence of acute infection.   Range of Motion: Limited dorsiflexion and Limited plantarflexion   DVT Exam: No evidence of DVT seen on physical exam.     Data Review  CBC:  Results from last 7 days   Lab Units 06/05/23  0518   WBC 10*3/mm3 3.21*   RBC 10*6/mm3 4.20   HEMOGLOBIN g/dL 11.3*   HEMATOCRIT % 36.3*   PLATELETS 10*3/mm3 158       Assessment & Plan     Charcot ankle  Edema  Open wound left lower extremity    -Recommending continuation of IV abx for at least 2 weeks.  -Continue UB for edema control.  -Updating radiographs, ESR, CRP for review due to pt being post talocalcaneal arthrodesis.  -Pt may be WBAT in CAM boot.  -All pt's questions answered.  -Call with questions.     LOS: 0 days             This document has been electronically signed by WALTER Triana on June 5, 2023 16:34 CDT

## 2023-06-05 NOTE — PROGRESS NOTES
[facility] Medicine Services  INPATIENT PROGRESS NOTE    Length of Stay: 0  Date of Admission: 6/3/2023  Primary Care Physician: Jamaal Bravo MD    Subjective   (S) Admitted for fever, low grade, from ARU  Today,  afebrile; no pain reported; tolerating PO, participating with PT    Review of Systems   All other systems reviewed and are negative.     All pertinent negatives and positives are as above. All other systems have been reviewed and are negative unless otherwise stated.     Prior to Admission medications    Medication Sig Start Date End Date Taking? Authorizing Provider   ascorbic acid (VITAMIN C) 1000 MG tablet Take 1 tablet by mouth Every Night. 7/19/21   Herve Cr MD   Bacillus Coagulans-Inulin (Probiotic) 1-250 BILLION-MG capsule Take 1 capsule by mouth 2 (Two) Times a Day.  Patient not taking: Reported on 5/30/2023 1/27/23   Lito Rico APRN   Blood Glucose Monitoring Suppl (ONE TOUCH ULTRA 2) w/Device kit  10/18/21   Herve Cr MD   Cholecalciferol 125 MCG (5000 UT) tablet Take 1 tablet by mouth Every Night. 7/19/21   Herve Cr MD   clindamycin (CLEOCIN) 300 MG capsule Take 1 capsule by mouth 3 (Three) Times a Day.  Patient not taking: Reported on 5/30/2023 4/5/23   Lito Rico APRN   dronedarone (MULTAQ) 400 MG tablet Take 1 tablet by mouth Every Night.    Herve Cr MD   fexofenadine (ALLEGRA) 180 MG tablet Take 1 tablet by mouth Every Night. 7/19/21   Herve Cr MD   fluticasone (FLONASE) 50 MCG/ACT nasal spray 2 sprays into the nostril(s) as directed by provider As Needed for Rhinitis.    Herve Cr MD   furosemide (Lasix) 20 MG tablet Take 1 tablet by mouth Daily.  Patient not taking: Reported on 5/30/2023 4/11/23   Lito Rico APRN   glimepiride (AMARYL) 4 MG tablet Take 1 tablet by mouth Every Night. 7/19/21   Herve Cr MD   losartan (COZAAR) 25 MG  tablet Take 1 tablet by mouth Daily. 7/19/21   Herve Cr MD   magnesium oxide (MAG-OX) 400 MG tablet Take 1 tablet by mouth 2 (Two) Times a Day. 11/25/21   Herve Cr MD   metFORMIN (GLUCOPHAGE) 1000 MG tablet Take 1 tablet by mouth 2 (Two) Times a Day With Meals. 7/19/21   Herve Cr MD   metoprolol succinate XL (TOPROL-XL) 25 MG 24 hr tablet Take 1 tablet by mouth 2 (Two) Times a Day. 0.5 tablet    Herve Cr MD   multivitamin (THERAGRAN) tablet tablet Take  by mouth Every Night.    Herve Cr MD   ONE TOUCH ULTRA TEST test strip USE TO TEST BLOOD SUGAR THREE TIMES A DAY 8/20/16   Herve Cr MD   Zinc 50 MG tablet Take 1 tablet by mouth Every Night.    Herve Cr MD       apixaban, 5 mg, Oral, Q12H  ascorbic acid, 1,000 mg, Oral, Nightly  vitamin D3, 5,000 Units, Oral, Nightly  dronedarone, 400 mg, Oral, Nightly  furosemide, 40 mg, Intravenous, Daily  Insulin Aspart, 0-14 Units, Subcutaneous, TID AC  losartan, 25 mg, Oral, Daily  magnesium oxide, 400 mg, Oral, BID  metoprolol tartrate, 25 mg, Oral, Q12H  multivitamin, 1 tablet, Oral, Nightly  piperacillin-tazobactam, 4.5 g, Intravenous, Q8H  senna-docusate sodium, 2 tablet, Oral, BID  sodium chloride, 10 mL, Intravenous, Q12H           Objective    Temp:  [97.5 °F (36.4 °C)-98.2 °F (36.8 °C)] 97.7 °F (36.5 °C)  Heart Rate:  [74-92] 81  Resp:  [18] 18  BP: (116-130)/(71-83) 127/83    Physical Exam  Constitutional:       Appearance: He is obese.      Comments: As today   HENT:      Head: Normocephalic.      Nose: Nose normal.      Mouth/Throat:      Mouth: Mucous membranes are moist.   Eyes:      Extraocular Movements: Extraocular movements intact.   Cardiovascular:      Rate and Rhythm: Normal rate and regular rhythm.      Heart sounds: Normal heart sounds.   Pulmonary:      Effort: Pulmonary effort is normal.      Breath sounds: Normal breath sounds.   Abdominal:      General: Bowel  sounds are normal.      Palpations: Abdomen is soft.   Musculoskeletal:      Cervical back: Normal range of motion and neck supple.      Comments: Left lower extremity with an Ulnar boot   Right foot with old TMA scar   Neurological:      General: No focal deficit present.      Mental Status: He is alert. Mental status is at baseline.   Psychiatric:         Behavior: Behavior normal.           Results Review:  I have reviewed the labs, radiology results, and diagnostic studies.    Laboratory Data:   Results from last 7 days   Lab Units 06/05/23 0518 06/04/23 0613 06/03/23  0519   SODIUM mmol/L 137 136 133*   POTASSIUM mmol/L 3.9 3.9 4.0   CHLORIDE mmol/L 99 100 97*   CO2 mmol/L 25.0 24.0 25.0   BUN mg/dL 23 24* 24*   CREATININE mg/dL 1.12 1.12 1.23   GLUCOSE mg/dL 115* 105* 120*   CALCIUM mg/dL 8.5* 8.2* 8.3*   BILIRUBIN mg/dL 0.7 0.7 0.8   ALK PHOS U/L 98 90 100   ALT (SGPT) U/L 25 21 18   AST (SGOT) U/L 34 31 25   ANION GAP mmol/L 13.0 12.0 11.0       Estimated Creatinine Clearance: 107.8 mL/min (by C-G formula based on SCr of 1.12 mg/dL).  Results from last 7 days   Lab Units 06/05/23 0518 06/04/23  0613 06/03/23  1017   MAGNESIUM mg/dL 2.4 2.2 2.1           Results from last 7 days   Lab Units 06/05/23 0518 06/04/23 0613 06/03/23  0519 06/02/23 2024 06/01/23  0510   WBC 10*3/mm3 3.21* 2.72* 4.81 5.68 5.71   HEMOGLOBIN g/dL 11.3* 9.9* 10.1* 10.6* 11.5*   HEMATOCRIT % 36.3* 30.5* 32.2* 32.7* 36.6*   PLATELETS 10*3/mm3 158 126* 131* 152 227             Culture Data:   Blood Culture   Date Value Ref Range Status   06/03/2023 No growth at 24 hours  Preliminary   06/03/2023 Enterobacter cloacae complex (C)  Final   06/02/2023 Enterobacter cloacae complex (C)  Final   06/02/2023 Enterobacter cloacae complex (C)  Final     Comment:       This organism may develop resistance during prolonged therapy with 3rd generation cephalosporins as a result of de-repression of AmpC B-lactamase. Testing repeat isolates may be  warranted if clinical status fails to improve.     No results found for: URINECX  No results found for: RESPCX  No results found for: WOUNDCX  No results found for: STOOLCX  No components found for: BODYFLD    Radiology Data:   Imaging Results (Last 24 Hours)       ** No results found for the last 24 hours. **            I have reviewed the patient's current medications.     Assessment/Plan     Fever     Came from ARU     Blood cultures positive and follow up so far negative     Bacteremia by Enterobacter Cloacae     Has started on Zosyn 4/14 by podiatrist antibiotics for that time and evaluate later since no signs of infection outside on wound     Pending sensitivity    Sepsis (POA)     Due to above, by the same bacteria     Continue with current antibiotics     Resolving     Constipation     On bowel protocol    Chronic medical problems     Essential hypertension     Type II DM     Hx of cardiac arrhythmias        Medical Decision Making  Number and Complexity of problems:one major problem  Differential Diagnosis: Sepsis    Conditions and Status:        Condition is improving.     MDM Data  External documents reviewed: previous medical records  My EKG interpretation: n/a  My plain film interpretation: no acute event     Discussed with: patient     Treatment Plan  See above    Care Planning  Shared decision making: his wife  Code status and discussions: full code    Disposition  Social Determinants of Health that impact treatment or disposition:none  I expect the patient to be discharged: in progress      I confirmed that the patient's Advance Care Plan is present, code status is documented, or surrogate decision maker is listed in the patient's medical record.     Andrew Brooks MD

## 2023-06-06 LAB
ALBUMIN SERPL-MCNC: 3.9 G/DL (ref 3.5–5.2)
ALBUMIN/GLOB SERPL: 1 G/DL
ALP SERPL-CCNC: 104 U/L (ref 39–117)
ALT SERPL W P-5'-P-CCNC: 25 U/L (ref 1–41)
ANION GAP SERPL CALCULATED.3IONS-SCNC: 13 MMOL/L (ref 5–15)
AST SERPL-CCNC: 27 U/L (ref 1–40)
BASOPHILS # BLD AUTO: 0.02 10*3/MM3 (ref 0–0.2)
BASOPHILS NFR BLD AUTO: 0.5 % (ref 0–1.5)
BILIRUB SERPL-MCNC: 0.7 MG/DL (ref 0–1.2)
BUN SERPL-MCNC: 32 MG/DL (ref 8–23)
BUN/CREAT SERPL: 26.2 (ref 7–25)
CALCIUM SPEC-SCNC: 9.1 MG/DL (ref 8.6–10.5)
CHLORIDE SERPL-SCNC: 98 MMOL/L (ref 98–107)
CO2 SERPL-SCNC: 27 MMOL/L (ref 22–29)
CREAT SERPL-MCNC: 1.22 MG/DL (ref 0.76–1.27)
DEPRECATED RDW RBC AUTO: 47.2 FL (ref 37–54)
EGFRCR SERPLBLD CKD-EPI 2021: 67.5 ML/MIN/1.73
EOSINOPHIL # BLD AUTO: 0.14 10*3/MM3 (ref 0–0.4)
EOSINOPHIL NFR BLD AUTO: 3.2 % (ref 0.3–6.2)
ERYTHROCYTE [DISTWIDTH] IN BLOOD BY AUTOMATED COUNT: 14.7 % (ref 12.3–15.4)
GLOBULIN UR ELPH-MCNC: 3.8 GM/DL
GLUCOSE BLDC GLUCOMTR-MCNC: 139 MG/DL (ref 70–130)
GLUCOSE BLDC GLUCOMTR-MCNC: 148 MG/DL (ref 70–130)
GLUCOSE BLDC GLUCOMTR-MCNC: 164 MG/DL (ref 70–130)
GLUCOSE BLDC GLUCOMTR-MCNC: 186 MG/DL (ref 70–130)
GLUCOSE BLDC GLUCOMTR-MCNC: 218 MG/DL (ref 70–130)
GLUCOSE BLDC GLUCOMTR-MCNC: 229 MG/DL (ref 70–130)
GLUCOSE SERPL-MCNC: 141 MG/DL (ref 65–99)
HCT VFR BLD AUTO: 37.1 % (ref 37.5–51)
HGB BLD-MCNC: 11.7 G/DL (ref 13–17.7)
IMM GRANULOCYTES # BLD AUTO: 0.02 10*3/MM3 (ref 0–0.05)
IMM GRANULOCYTES NFR BLD AUTO: 0.5 % (ref 0–0.5)
LYMPHOCYTES # BLD AUTO: 1.46 10*3/MM3 (ref 0.7–3.1)
LYMPHOCYTES NFR BLD AUTO: 33.7 % (ref 19.6–45.3)
MAGNESIUM SERPL-MCNC: 2.5 MG/DL (ref 1.6–2.4)
MCH RBC QN AUTO: 27.6 PG (ref 26.6–33)
MCHC RBC AUTO-ENTMCNC: 31.5 G/DL (ref 31.5–35.7)
MCV RBC AUTO: 87.5 FL (ref 79–97)
MONOCYTES # BLD AUTO: 0.47 10*3/MM3 (ref 0.1–0.9)
MONOCYTES NFR BLD AUTO: 10.9 % (ref 5–12)
NEUTROPHILS NFR BLD AUTO: 2.22 10*3/MM3 (ref 1.7–7)
NEUTROPHILS NFR BLD AUTO: 51.2 % (ref 42.7–76)
NRBC BLD AUTO-RTO: 0 /100 WBC (ref 0–0.2)
PLATELET # BLD AUTO: 176 10*3/MM3 (ref 140–450)
PMV BLD AUTO: 10.3 FL (ref 6–12)
POTASSIUM SERPL-SCNC: 4.1 MMOL/L (ref 3.5–5.2)
PROT SERPL-MCNC: 7.7 G/DL (ref 6–8.5)
RBC # BLD AUTO: 4.24 10*6/MM3 (ref 4.14–5.8)
SODIUM SERPL-SCNC: 138 MMOL/L (ref 136–145)
WBC NRBC COR # BLD: 4.33 10*3/MM3 (ref 3.4–10.8)

## 2023-06-06 PROCEDURE — 63710000001 INSULIN ASPART PER 5 UNITS: Performed by: HOSPITALIST

## 2023-06-06 PROCEDURE — 82948 REAGENT STRIP/BLOOD GLUCOSE: CPT

## 2023-06-06 PROCEDURE — 83735 ASSAY OF MAGNESIUM: CPT | Performed by: HOSPITALIST

## 2023-06-06 PROCEDURE — 99024 POSTOP FOLLOW-UP VISIT: CPT | Performed by: PODIATRIST

## 2023-06-06 PROCEDURE — 97110 THERAPEUTIC EXERCISES: CPT

## 2023-06-06 PROCEDURE — 97530 THERAPEUTIC ACTIVITIES: CPT

## 2023-06-06 PROCEDURE — 85025 COMPLETE CBC W/AUTO DIFF WBC: CPT | Performed by: HOSPITALIST

## 2023-06-06 PROCEDURE — 25010000002 PIPERACILLIN SOD-TAZOBACTAM PER 1 G: Performed by: INTERNAL MEDICINE

## 2023-06-06 PROCEDURE — 80053 COMPREHEN METABOLIC PANEL: CPT | Performed by: HOSPITALIST

## 2023-06-06 PROCEDURE — 25010000002 FUROSEMIDE PER 20 MG: Performed by: HOSPITALIST

## 2023-06-06 RX ADMIN — ACETAMINOPHEN 650 MG: 325 TABLET, FILM COATED ORAL at 20:19

## 2023-06-06 RX ADMIN — METOPROLOL TARTRATE 25 MG: 25 TABLET, FILM COATED ORAL at 20:20

## 2023-06-06 RX ADMIN — THERA TABS 1 TABLET: TAB at 20:20

## 2023-06-06 RX ADMIN — DOCUSATE SODIUM 50 MG AND SENNOSIDES 8.6 MG 2 TABLET: 8.6; 5 TABLET, FILM COATED ORAL at 20:20

## 2023-06-06 RX ADMIN — APIXABAN 5 MG: 5 TABLET, FILM COATED ORAL at 20:20

## 2023-06-06 RX ADMIN — Medication 10 ML: at 22:18

## 2023-06-06 RX ADMIN — INSULIN ASPART 5 UNITS: 100 INJECTION, SOLUTION INTRAVENOUS; SUBCUTANEOUS at 11:48

## 2023-06-06 RX ADMIN — PIPERACILLIN SODIUM AND TAZOBACTAM SODIUM 3.38 G: 3; .375 INJECTION, POWDER, LYOPHILIZED, FOR SOLUTION INTRAVENOUS at 13:40

## 2023-06-06 RX ADMIN — PIPERACILLIN SODIUM AND TAZOBACTAM SODIUM 3.38 G: 3; .375 INJECTION, POWDER, LYOPHILIZED, FOR SOLUTION INTRAVENOUS at 05:34

## 2023-06-06 RX ADMIN — LOSARTAN POTASSIUM 25 MG: 25 TABLET, FILM COATED ORAL at 09:03

## 2023-06-06 RX ADMIN — DOCUSATE SODIUM 50 MG AND SENNOSIDES 8.6 MG 2 TABLET: 8.6; 5 TABLET, FILM COATED ORAL at 09:03

## 2023-06-06 RX ADMIN — Medication 400 MG: at 08:12

## 2023-06-06 RX ADMIN — Medication 400 MG: at 20:20

## 2023-06-06 RX ADMIN — INSULIN ASPART 3 UNITS: 100 INJECTION, SOLUTION INTRAVENOUS; SUBCUTANEOUS at 17:17

## 2023-06-06 RX ADMIN — FUROSEMIDE 40 MG: 10 INJECTION, SOLUTION INTRAVENOUS at 08:16

## 2023-06-06 RX ADMIN — DRONEDARONE 400 MG: 400 TABLET, FILM COATED ORAL at 20:20

## 2023-06-06 RX ADMIN — Medication 10 ML: at 08:14

## 2023-06-06 RX ADMIN — PIPERACILLIN SODIUM AND TAZOBACTAM SODIUM 3.38 G: 3; .375 INJECTION, POWDER, LYOPHILIZED, FOR SOLUTION INTRAVENOUS at 22:18

## 2023-06-06 RX ADMIN — Medication 5000 UNITS: at 20:20

## 2023-06-06 RX ADMIN — HYDROCODONE BITARTRATE AND ACETAMINOPHEN 1 TABLET: 5; 325 TABLET ORAL at 02:06

## 2023-06-06 RX ADMIN — Medication 10 ML: at 20:22

## 2023-06-06 RX ADMIN — OXYCODONE HYDROCHLORIDE AND ACETAMINOPHEN 1000 MG: 500 TABLET ORAL at 20:20

## 2023-06-06 RX ADMIN — APIXABAN 5 MG: 5 TABLET, FILM COATED ORAL at 08:12

## 2023-06-06 NOTE — THERAPY TREATMENT NOTE
Patient Name: Emre Lisa  : 1960    MRN: [id number]                              Today's Date: 2023       Admit Date: 6/3/2023    Visit Dx:     ICD-10-CM ICD-9-CM   1. Fever of unknown origin  R50.9 780.60   2. Other specified hypotension  I95.89 458.8   3. Impaired mobility and ADLs [Z74.09, Z78.9 (ICD-10-CM)]  Z74.09 V49.89    Z78.9    4. Impaired functional mobility, balance, gait, and endurance [Z74.09 (ICD-10-CM)]  Z74.09 V49.89     Patient Active Problem List   Diagnosis    Foot osteomyelitis, right    Nodule of groin    Type 2 diabetes mellitus with skin complication    Status post transmetatarsal amputation of right foot    Hammer toe of left foot    Hallux malleus of left foot    Cellulitis of left foot    Infected hardware in left leg    Chronic multifocal osteomyelitis of left foot    Charcot's joint of left foot    Skin ulcer of left foot, limited to breakdown of skin    Dyspnea    Syncope    Acute respiratory failure with hypoxia    Tachycardia    Neuropathy due to secondary diabetes mellitus    Atrial fibrillation    Presence of biventricular cardiac pacemaker    Hypertension    Medically complex patient    Fever of unknown origin    Bacteremia     Past Medical History:   Diagnosis Date    Arthritis     Atrial fibrillation     Diabetes mellitus     Diabetic foot ulcer associated with type 2 diabetes mellitus     left great toe    Hallux malleus of left foot     Hammer toe     Hypertension     MI (myocardial infarction)     Neuropathy in diabetes     Onychomycosis     Presence of biventricular cardiac pacemaker     Rheumatoid arthritis      Past Surgical History:   Procedure Laterality Date    AMPUTATION DIGIT Right 2017    Procedure: RIGHT AMPUTATION TRANSMETATRASAL , RECESSION GASTROCNEMOUS;  Surgeon: Marco A Thompson DPM;  Location: [facility];  Service:     ANKLE FUSION Left 2023    Procedure: REDUCTION OF LEFT ANKLE DEFORMITY WITH APPLICATION OF EXTERNAL FIXATOR;   Surgeon: Marco A Thompson DPM;  Location: [facility] OR;  Service: Podiatry;  Laterality: Left;    CARDIAC CATHETERIZATION      CARDIAC DEFIBRILLATOR PLACEMENT  2010, 2016    CARPAL TUNNEL RELEASE  2015    elbow and wrist left arm    FOOT FUSION Left 05/15/2023    Procedure: REMOVAL OF EXTERNAL FIXATOR LEFT LOWER EXTREMITY WITH TIBIAL TALOCALCANEAL ARTHRODESIS  AND ALL INDICATED PROCEDURES;  Surgeon: Marco A Thompson DPM;  Location: [facility] OR;  Service: Podiatry;  Laterality: Left;    FOOT IRRIGATION, DEBRIDEMENT AND REPAIR Left 03/07/2018    Procedure: FOOT IRRIGATION, DEBRIDEMENT AND REPAIR;  Surgeon: Marco A Thompson DPM;  Location: [facility] OR;  Service:     FOOT IRRIGATION, DEBRIDEMENT AND REPAIR Left 03/10/2018    Procedure: FOOT IRRIGATION, DEBRIDEMENT AND REPAIR;  Surgeon: Marco A Thompson DPM;  Location: [facility] OR;  Service: Podiatry    FOOT WOUND CLOSURE Right 03/02/2017    Procedure: INCISION AND DRAINAGE, RIGHT FOOT;  Surgeon: Tung Rosenthal DPM;  Location: [facility] OR;  Service:     HAMMER TOE REPAIR Left 02/15/2018    Procedure: HAMMERTOE CORRECTION LEFT SECOND, THIRD AND FOURTH TOES AND HALLUX INTERPHALANGEAL JOINT ARTHRODESIS LEFT FOOT (Micro Asnis, 4.0 & 5.0 Asnis)      (c-arm);  Surgeon: Marco A Thompson DPM;  Location: [facility] OR;  Service:     HARDWARE REMOVAL Left 03/05/2018    Procedure: ANKLE/FOOT HARDWARE REMOVAL;  Surgeon: Marco A Thompson DPM;  Location: [facility] OR;  Service:     INCISION AND DRAINAGE LEG Left 03/05/2018    Procedure: INCISION AND DRAINAGE LOWER EXTREMITY;  Surgeon: Marco A Thompson DPM;  Location: [facility] OR;  Service:     PLANTAR FASCIA RELEASE Left 11/21/2019    Procedure: PLANTAR PLANING LEFT FOOT AND ALL OTHER INDICATED PROCEDURES;  Surgeon: Marco A Thompson DPM;  Location: [facility] OR;  Service: Podiatry    TOE AMPUTATION Right 2016    TONSILECTOMY, ADENOIDECTOMY, BILATERAL MYRINGOTOMY AND TUBES      age 25      General Information       Row Name 06/06/23 9852          OT Time and  Intention    Document Type therapy note (daily note)  -     Mode of Treatment individual therapy;occupational therapy  -       Row Name 06/06/23 1330          General Information    Patient Profile Reviewed yes  -KD     Existing Precautions/Restrictions fall;weight bearing;other (see comments)  -KD       Row Name 06/06/23 1330          Cognition    Orientation Status (Cognition) oriented x 4  -KD       Row Name 06/06/23 1330          Safety Issues, Functional Mobility    Impairments Affecting Function (Mobility) strength;endurance/activity tolerance;sensation/sensory awareness;balance  -KD               User Key  (r) = Recorded By, (t) = Taken By, (c) = Cosigned By      Initials Name Provider Type    Kim Horowitz COTA Occupational Therapist Assistant                     Mobility/ADL's       Row Name 06/06/23 1330          Bed Mobility    Comment, (Bed Mobility) Pt deferred any EOB/OOB @ this time.  -KD       Row Name 06/06/23 1330          Mobility    Extremity Weight-bearing Status left lower extremity  -KD     Left Lower Extremity (Weight-bearing Status) weight-bearing as tolerated (WBAT)  -               User Key  (r) = Recorded By, (t) = Taken By, (c) = Cosigned By      Initials Name Provider Type    Kim Horowitz COTA Occupational Therapist Assistant                   Obj/Interventions       Row Name 06/06/23 1330          Shoulder (Therapeutic Exercise)    Shoulder AROM (Therapeutic Exercise) bilateral;flexion;aBduction;aDduction;external rotation;extension;internal rotation;horizontal aBduction/aDduction  No Shld flex/ext on RUE  -KD       Row Name 06/06/23 1330          Elbow/Forearm (Therapeutic Exercise)    Elbow/Forearm (Therapeutic Exercise) AROM (active range of motion)  -     Elbow/Forearm AROM (Therapeutic Exercise) bilateral;flexion;extension;supination;pronation  -KD       Row Name 06/06/23 1330          Wrist (Therapeutic Exercise)    Wrist (Therapeutic Exercise) AROM (active  range of motion)  -KD     Wrist AROM (Therapeutic Exercise) bilateral;flexion;extension  -KD       Row Name 06/06/23 1330          Hand (Therapeutic Exercise)    Hand (Therapeutic Exercise) AROM (active range of motion)  -KD     Hand AROM/AAROM (Therapeutic Exercise) bilateral;finger flexion;finger extension  -KD       Row Name 06/06/23 1330          Motor Skills    Therapeutic Exercise shoulder;elbow/forearm;wrist;hand  -KD               User Key  (r) = Recorded By, (t) = Taken By, (c) = Cosigned By      Initials Name Provider Type     Kim Fernandez COTA Occupational Therapist Assistant                   Goals/Plan       Row Name 06/06/23 1330          Transfer Goal 1 (OT)    Activity/Assistive Device (Transfer Goal 1, OT) bed-to-chair/chair-to-bed;wheelchair transfer;commode, bedside without drop arms;commode, bedside with drop arms;walker, rolling  -KD     [place] Level/Cues Needed (Transfer Goal 1, OT) modified independence  -KD     Time Frame (Transfer Goal 1, OT) long term goal (LTG);by discharge  -KD     Progress/Outcome (Transfer Goal 1, OT) goal not met  -KD       Row Name 06/06/23 1330          Bathing Goal 1 (OT)    Activity/Device (Bathing Goal 1, OT) lower body bathing  -KD     [place] Level/Cues Needed (Bathing Goal 1, OT) modified independence  -KD     Time Frame (Bathing Goal 1, OT) long term goal (LTG);by discharge  -KD     Progress/Outcomes (Bathing Goal 1, OT) goal not met  -KD       Row Name 06/06/23 1330          Dressing Goal 1 (OT)    Activity/Device (Dressing Goal 1, OT) lower body dressing  -KD     [place]ati/Cues Needed (Dressing Goal 1, OT) modified independence  -KD     Time Frame (Dressing Goal 1, OT) long term goal (LTG);by discharge  -KD     Progress/Outcome (Dressing Goal 1, OT) goal not met  -KD       Row Name 06/06/23 1330          Toileting Goal 1 (OT)    Activity/Device (Toileting Goal 1, OT) toileting skills, all  -KD     [place] Level/Cues Needed (Toileting  Goal 1, OT) modified independence  -KD     Time Frame (Toileting Goal 1, OT) long term goal (LTG);by discharge  -KD     Progress/Outcome (Toileting Goal 1, OT) goal not met  -KD       Row Name 06/06/23 1330           Activity Tolerance Goal 1 (OT)    Activity Level (Endurance Goal 1, OT) 15 min activity  -KD     Progress/Outcome (Activity Tolerance Goal 1, OT) goal not met  -KD               User Key  (r) = Recorded By, (t) = Taken By, (c) = Cosigned By      Initials Name Provider Type    KD Kim Fernandez COTA Occupational Therapist Assistant                   Clinical Impression       Row Name 06/06/23 1330          Pain Assessment    Pretreatment Pain Rating 0/10 - no pain  -KD     Posttreatment Pain Rating 0/10 - no pain  -KD       Row Name 06/06/23 1330          Plan of Care Review    Plan of Care Reviewed With patient  -KD     Outcome Evaluation OT tx complete this date. Pt participated in BUE ther ex in all planes w/ 2lb HW and good tolerance w/ RB's. Pt deferred any OOB @ this time 2' just returning to bed from BR. No goals met this date. Cont OT POC.  -KD       Row Name 06/06/23 1330          Therapy Assessment/Plan (OT)    Therapy Frequency (OT) other (see comments)  5-7 days a week  -KD       Row Name 06/06/23 1330          Therapy Plan Review/Discharge Plan (OT)    Anticipated Discharge Disposition (OT) inpatient rehabilitation facility  -KD       Row Name 06/06/23 1330          Vital Signs    Pre Systolic BP Rehab 111  -KD     Pre Treatment Diastolic BP 76  -KD     Pretreatment Heart Rate (beats/min) 101  -KD     Pre SpO2 (%) 98  -KD     O2 Delivery Pre Treatment room air  -KD     Pre Patient Position Supine  -KD     Intra Patient Position Sitting  -KD     Post Patient Position Supine  -KD       Row Name 06/06/23 1330          Positioning and Restraints    Pre-Treatment Position in bed  -KD     Post Treatment Position bed  -KD     In Bed supine;call light within reach;encouraged to call for  assist;exit alarm on  -KD               User Key  (r) = Recorded By, (t) = Taken By, (c) = Cosigned By      Initials Name Provider Type    Kim Horowitz COTA Occupational Therapist Assistant                   Outcome Measures       Row Name 06/06/23 1330          How much help from another is currently needed...    Putting on and taking off regular lower body clothing? 2  -KD     Bathing (including washing, rinsing, and drying) 2  -KD     Toileting (which includes using toilet bed pan or urinal) 2  -KD     Putting on and taking off regular upper body clothing 3  -KD     Taking care of personal grooming (such as brushing teeth) 4  -KD     Eating meals 4  -KD     AM-PAC 6 Clicks Score (OT) 17  -KD       Row Name 06/06/23 0800          How much help from another person do you currently need...    Turning from your back to your side while in flat bed without using bedrails? 3  -SN     Moving from lying on back to sitting on the side of a flat bed without bedrails? 3  -SN     Moving to and from a bed to a chair (including a wheelchair)? 3  -SN     Standing up from a chair using your arms (e.g., wheelchair, bedside chair)? 3  -SN     Climbing 3-5 steps with a railing? 2  -SN     To walk in hospital room? 3  -SN     AM-PAC 6 Clicks Score (PT) 17  -SN               User Key  (r) = Recorded By, (t) = Taken By, (c) = Cosigned By      Initials Name Provider Type    Kim Horowitz COTA Occupational Therapist Assistant    Derrick Castañeda RN Registered Nurse                    Occupational Therapy Education       Title: PT OT SLP Therapies (In Progress)       Topic: Occupational Therapy (Done)       Point: ADL training (Done)       Description:   Instruct learner(s) on proper safety adaptation and remediation techniques during self care or transfers.   Instruct in proper use of assistive devices.                  Learning Progress Summary             Patient Acceptance, E,TB, VU by  at 6/3/2023 1508    Comment: OT  POC, Role of OT, transfer training   Significant Other Acceptance, E,TB, VU by  at 6/3/2023 1503    Comment: OT POC, Role of OT, transfer training                         Point: Precautions (Done)       Description:   Instruct learner(s) on prescribed precautions during self-care and functional transfers.                  Learning Progress Summary             Patient Acceptance, E,TB, VU by  at 6/3/2023 1503    Comment: OT POC, Role of OT, transfer training   Significant Other Acceptance, E,TB, VU by  at 6/3/2023 1503    Comment: OT POC, Role of OT, transfer training                         Point: Body mechanics (Done)       Description:   Instruct learner(s) on proper positioning and spine alignment during self-care, functional mobility activities and/or exercises.                  Learning Progress Summary             Patient Acceptance, E,TB, VU by  at 6/3/2023 1503    Comment: OT POC, Role of OT, transfer training   Significant Other Acceptance, E,TB, VU by  at 6/3/2023 1503    Comment: OT POC, Role of OT, transfer training                                         User Key       Initials Effective Dates Name Provider Type Discipline     08/11/22 -  Estephania Arnett OT Occupational Therapist OT                  OT Recommendation and Plan  Therapy Frequency (OT): other (see comments) (5-7 days a week)  Plan of Care Review  Plan of Care Reviewed With: patient  Progress: improving  Outcome Evaluation: OT tx complete this date. Pt participated in BUE ther ex in all planes w/ 2lb HW and good tolerance w/ RB's. Pt deferred any OOB @ this time 2' just returning to bed from BR. No goals met this date. Cont OT POC.     Time Calculation:    Time Calculation- OT       Row Name 06/06/23 1330             Time Calculation- OT    OT Start Time 1330  -KD      OT Stop Time 1353  -KD      OT Time Calculation (min) 23 min  -KD      Total Timed Code Minutes- OT 23 minute(s)  -KD      OT Received On 06/06/23  -KD          Timed Charges    92453 - OT Therapeutic Exercise Minutes 23  -KD         Total Minutes    Timed Charges Total Minutes 23  -KD       Total Minutes 23  -KD                User Key  (r) = Recorded By, (t) = Taken By, (c) = Cosigned By      Initials Name Provider Type    Kim Horowitz COTA Occupational Therapist Assistant                  Therapy Charges for Today       Code Description Service Date Service Provider Modifiers Qty    95814631394 HC OT THER PROC EA 15 MIN 6/5/2023 Kim Fernandez COTA GO 1    [id number] HC OT THER PROC EA 15 MIN 6/6/2023 Kim Fernandez COTA GO 2                 ZIGGY Morse  6/6/2023

## 2023-06-06 NOTE — PLAN OF CARE
Goal Outcome Evaluation:  Plan of Care Reviewed With: patient    Up to bsc with 1 assist for medium soft formed BM.Sat on side of bed and brushed teeth.Rested off and on-Medicated for pain once with relief voiced.

## 2023-06-06 NOTE — PLAN OF CARE
Goal Outcome Evaluation:  Plan of Care Reviewed With: patient           Outcome Evaluation: pt t/fers sup<>sit w/ SBA, sit<>stand w/ CGA w/ RW, pt ambulates ~5' x 4 w/ RW w/ CGA, pt performs B LE seated ther ex x 15 reps

## 2023-06-06 NOTE — PLAN OF CARE
Goal Outcome Evaluation:                 Resting in bed, AA+O X4, afebrile, VSS, has required no pain medication today, ongoing complaint of neck and shoulder discomfort, voices no new complaint or concern, no changes noted, no acute distress.

## 2023-06-06 NOTE — CONSULTS
Adult Nutrition  Assessment/PES    Patient Name:  Emre Lisa  YOB: 1960  MRN: [id number]  Admit Date:  6/3/2023    Assessment Date:  6/6/2023    Comments:  RD follow up.  Pt d/c from ARU on 6/3 and admitted to ED then to acute care.  Pt was admitted with fever of unknown origin and bacteremia.  Pt reports meals have been going well but not always getting foods he likes.  Pt gave preferences and RD will add to diet message.  Noted edema:  3 L leg, 2 R leg, 4 L ankle, 4 L foot.  Last BM 6/5.  Noted wounds:  L lower leg, L posterior heel, L anterior ankle, L anterior foot incision, L medial gluteal.  Pt continues to wear boot on foot.       Reason for Assessment       Row Name 06/06/23 1232          Reason for Assessment    Reason For Assessment follow-up protocol                    Nutrition/Diet History       Row Name 06/06/23 1232          Nutrition/Diet History    Typical Intake (Food/Fluid/EN/PN) pt reports meals have been going okay but not always getting foods he likes                    Labs/Tests/Procedures/Meds       Row Name 06/06/23 1232          Labs/Procedures/Meds    Lab Results Reviewed reviewed, pertinent     Lab Results Comments Glu 218, BUN 32, A1c 6.7, Mg 2.5, CRP 8.78, Procalcitonin 0.69        Diagnostic Tests/Procedures    Diagnostic Test/Procedure Reviewed reviewed, pertinent        Medications    Pertinent Medications Reviewed reviewed, pertinent     Pertinent Medications Comments Vit C, D3, lasix, ssi, mag-ox, multivit, zosyn, pericolace                      Estimated/Assessed Needs - Anthropometrics       Row Name 06/06/23 1233          Anthropometrics    Weight for Calculation 91.6 kg (202 lb)        Estimated/Assessed Needs    Additional Documentation KCAL/KG (Group);Protein Requirements (Group);Fluid Requirements (Group)        KCAL/KG    KCAL/KG 25 Kcal/Kg (kcal)     25 Kcal/Kg (kcal) 2290.675        Protein Requirements    Weight Used For Protein Calculations  91.6 kg (202 lb)     Est Protein Requirement Amount (gms/kg) 0.8 gm protein     Estimated Protein Requirements (gms/day) 73.3        Fluid Requirements    Fluid Requirements (mL/day) 2200     RDA Method (mL) 2200                    Nutrition Prescription Ordered       Row Name 06/06/23 1234          Nutrition Prescription PO    Current PO Diet Regular     Common Modifiers Cardiac;Consistent Carbohydrate                    Evaluation of Received Nutrient/Fluid Intake       Row Name 06/06/23 1234          PO Evaluation    Number of Meals 18     % PO Intake 67                       Problem/Interventions:   Problem 1       Row Name 06/06/23 1235          Nutrition Diagnoses Problem 1    Problem 1 Increased Nutrient Needs     Macronutrient Kcal;Protein     Micronutrient Vitamin;Mineral     Etiology (related to) Medical Diagnosis     Skin Surgical wound     Signs/Symptoms (evidenced by) Other (comment)  unnaboot and support boot in place                          Intervention Goal       Row Name 06/06/23 1235          Intervention Goal    General Maintain nutrition;Meet nutritional needs for age/condition     PO Meet estimated needs     Weight No significant weight loss                    Nutrition Intervention       Row Name 06/06/23 1235          Nutrition Intervention    RD/Tech Action Follow Tx progress;Care plan reviewd                      Education/Evaluation       Row Name 06/06/23 1235          Education    Education Education topics     Education Topics Basic nutrition;Other (comment)  increased needs        Monitor/Evaluation    Monitor Per protocol;PO intake;Pertinent labs;Weight;Skin status     Education Follow-up Reinforce PRN                     Electronically signed by:  Gavi Xiong RD  06/06/23 12:36 CDT

## 2023-06-06 NOTE — PLAN OF CARE
Goal Outcome Evaluation:  Plan of Care Reviewed With: patient        Progress: improving  Outcome Evaluation: OT tx complete this date. Pt participated in BUE ther ex in all planes w/ 2lb HW and good tolerance w/ RB's. Pt deferred any OOB @ this time 2' just returning to bed from BR. No goals met this date. Cont OT POC.

## 2023-06-06 NOTE — PROGRESS NOTES
[facility] ER Medicine Services  INPATIENT PROGRESS NOTE    Length of Stay: 1  Date of Admission: 6/3/2023  Primary Care Physician: Jamaal Bravo MD    Subjective   (S) Admitted for fever, low grade, from ARU  Today,  no complains; waiting to go to [facility]    Review of Systems   All other systems reviewed and are negative.     All pertinent negatives and positives are as above. All other systems have been reviewed and are negative unless otherwise stated.     Prior to Admission medications    Medication Sig Start Date End Date Taking? Authorizing Provider   ascorbic acid (VITAMIN C) 1000 MG tablet Take 1 tablet by mouth Every Night. 7/19/21   Herve Cr MD   Bacillus Coagulans-Inulin (Probiotic) 1-250 BILLION-MG capsule Take 1 capsule by mouth 2 (Two) Times a Day.  Patient not taking: Reported on 5/30/2023 1/27/23   Lito Rico APRN   Blood Glucose Monitoring Suppl (ONE TOUCH ULTRA 2) w/Device kit  10/18/21   Herve Cr MD   Cholecalciferol 125 MCG (5000 UT) tablet Take 1 tablet by mouth Every Night. 7/19/21   Herve Cr MD   clindamycin (CLEOCIN) 300 MG capsule Take 1 capsule by mouth 3 (Three) Times a Day.  Patient not taking: Reported on 5/30/2023 4/5/23   Lito Rico APRN   dronedarone (MULTAQ) 400 MG tablet Take 1 tablet by mouth Every Night.    Herve Cr MD   fexofenadine (ALLEGRA) 180 MG tablet Take 1 tablet by mouth Every Night. 7/19/21   Herve Cr MD   fluticasone (FLONASE) 50 MCG/ACT nasal spray 2 sprays into the nostril(s) as directed by provider As Needed for Rhinitis.    Herve Cr MD   furosemide (Lasix) 20 MG tablet Take 1 tablet by mouth Daily.  Patient not taking: Reported on 5/30/2023 4/11/23   Lito Rico APRN   glimepiride (AMARYL) 4 MG tablet Take 1 tablet by mouth Every Night. 7/19/21   Herve Cr MD   losartan (COZAAR) 25 MG tablet Take 1 tablet by mouth  Daily. 7/19/21   Herve Cr MD   magnesium oxide (MAG-OX) 400 MG tablet Take 1 tablet by mouth 2 (Two) Times a Day. 11/25/21   Herve Cr MD   metFORMIN (GLUCOPHAGE) 1000 MG tablet Take 1 tablet by mouth 2 (Two) Times a Day With Meals. 7/19/21   Herve Cr MD   metoprolol succinate XL (TOPROL-XL) 25 MG 24 hr tablet Take 1 tablet by mouth 2 (Two) Times a Day. 0.5 tablet    Herve Cr MD   multivitamin (THERAGRAN) tablet tablet Take  by mouth Every Night.    Herve Cr MD   ONE TOUCH ULTRA TEST test strip USE TO TEST BLOOD SUGAR THREE TIMES A DAY 8/20/16   Herve Cr MD   Zinc 50 MG tablet Take 1 tablet by mouth Every Night.    Herve Cr MD       apixaban, 5 mg, Oral, Q12H  ascorbic acid, 1,000 mg, Oral, Nightly  vitamin D3, 5,000 Units, Oral, Nightly  dronedarone, 400 mg, Oral, Nightly  furosemide, 40 mg, Intravenous, Daily  Insulin Aspart, 0-14 Units, Subcutaneous, TID AC  losartan, 25 mg, Oral, Daily  magnesium oxide, 400 mg, Oral, BID  metoprolol tartrate, 25 mg, Oral, Q12H  multivitamin, 1 tablet, Oral, Nightly  piperacillin-tazobactam, 3.375 g, Intravenous, Q8H  senna-docusate sodium, 2 tablet, Oral, BID  sodium chloride, 10 mL, Intravenous, Q12H           Objective    Temp:  [98 °F (36.7 °C)-98.6 °F (37 °C)] 98 °F (36.7 °C)  Heart Rate:  [58-95] 95  Resp:  [18] 18  BP: (105-121)/(64-70) 105/68    Physical Exam  Constitutional:       Appearance: He is obese.      Comments: As today   HENT:      Head: Normocephalic.      Nose: Nose normal.      Mouth/Throat:      Mouth: Mucous membranes are moist.   Eyes:      Extraocular Movements: Extraocular movements intact.   Cardiovascular:      Rate and Rhythm: Normal rate and regular rhythm.      Heart sounds: Normal heart sounds.   Pulmonary:      Effort: Pulmonary effort is normal.      Breath sounds: Normal breath sounds.   Abdominal:      General: Bowel sounds are normal.      Palpations:  Abdomen is soft.   Musculoskeletal:      Cervical back: Normal range of motion and neck supple.      Comments: Left lower extremity with an Ulnar boot   Right foot with old TMA scar   Neurological:      General: No focal deficit present.      Mental Status: He is alert. Mental status is at baseline.   Psychiatric:         Behavior: Behavior normal.           Results Review:  I have reviewed the labs, radiology results, and diagnostic studies.    Laboratory Data:   Results from last 7 days   Lab Units 06/06/23  0619 06/05/23  0518 06/04/23  0613   SODIUM mmol/L 138 137 136   POTASSIUM mmol/L 4.1 3.9 3.9   CHLORIDE mmol/L 98 99 100   CO2 mmol/L 27.0 25.0 24.0   BUN mg/dL 32* 23 24*   CREATININE mg/dL 1.22 1.12 1.12   GLUCOSE mg/dL 141* 115* 105*   CALCIUM mg/dL 9.1 8.5* 8.2*   BILIRUBIN mg/dL 0.7 0.7 0.7   ALK PHOS U/L 104 98 90   ALT (SGPT) U/L 25 25 21   AST (SGOT) U/L 27 34 31   ANION GAP mmol/L 13.0 13.0 12.0       Estimated Creatinine Clearance: 98.9 mL/min (by C-G formula based on SCr of 1.22 mg/dL).  Results from last 7 days   Lab Units 06/06/23  0619 06/05/23  0518 06/04/23  0613   MAGNESIUM mg/dL 2.5* 2.4 2.2           Results from last 7 days   Lab Units 06/06/23  0619 06/05/23  0518 06/04/23  0613 06/03/23  0519 06/02/23  2024   WBC 10*3/mm3 4.33 3.21* 2.72* 4.81 5.68   HEMOGLOBIN g/dL 11.7* 11.3* 9.9* 10.1* 10.6*   HEMATOCRIT % 37.1* 36.3* 30.5* 32.2* 32.7*   PLATELETS 10*3/mm3 176 158 126* 131* 152             Culture Data:   No results found for: BLOODCX    No results found for: URINECX  No results found for: RESPCX  No results found for: WOUNDCX  No results found for: STOOLCX  No components found for: BODYFLD    Radiology Data:   Imaging Results (Last 24 Hours)       Procedure Component Value Units Date/Time    XR Foot 3+ View Left [[id number]] Collected: 06/05/23 1759     Updated: 06/05/23 2017    Narrative:      X-RAY FOOT LEFT, THREE VIEWS    HISTORY:  Post-op.    FINDINGS:  Retrograde nailing of the  tibia/ankle fusion with hardware appearing intact.  Otherwise similar-appearing marked changes of Charcot arthropathy.        XR Ankle 3+ View Left [[id number]] Collected: 06/05/23 1759     Updated: 06/05/23 2017    Narrative:      X-RAY ANKLE LEFT, THREE VIEWS    HISTORY:  Post-op.    FINDINGS:  Postoperative changes of retrograde tibial nailing/ankle fusion with distal  fibular resection.  Otherwise similar-appearing marked changes of Charcot  arthropathy.              I have reviewed the patient's current medications.     Assessment/Plan     Fever     Came from ARU     Blood cultures positive and follow up so far negative     Bacteremia by Enterobacter Cl[place]acae     Has started on Zosyn 4/14; by podiatrist antibiotics for 2 weeks since surgical wound is clean;     Sepsis (POA)     Due to above, by the same bacteria     Continue with current antibiotics     Resolving     Constipation     On bowel protocol    Chronic medical problems     Essential hypertension     Type II DM     Hx of cardiac arrhythmias        Medical Decision Making  Number and Complexity of problems:one major problem  Differential Diagnosis: Sepsis    Conditions and Status:        Condition is improving.     MDM Data  External documents reviewed: previous medical records  My EKG interpretation: n/a  My plain film interpretation: no acute event     Discussed with: patient     Treatment Plan  See above    Care Planning  Shared decision making: his wife  Code status and discussions: full code    Disposition  Social Determinants of Health that impact treatment or disposition:none  I expect the patient to be discharged to [facility]      I confirmed that the patient's Advance Care Plan is present, code status is documented, or surrogate decision maker is listed in the patient's medical record.     Andrew Brooks MD

## 2023-06-06 NOTE — THERAPY TREATMENT NOTE
Acute Care - Physical Therapy Treatment Note  [facility]     Patient Name: Emre Lisa  : 1960  MRN: [id number]  Today's Date: 2023      Visit Dx:     ICD-10-CM ICD-9-CM   1. Fever of unknown origin  R50.9 780.60   2. Other specified hypotension  I95.89 458.8   3. Impaired mobility and ADLs [Z74.09, Z78.9 (ICD-10-CM)]  Z74.09 V49.89    Z78.9    4. Impaired functional mobility, balance, gait, and endurance [Z74.09 (ICD-10-CM)]  Z74.09 V49.89     Patient Active Problem List   Diagnosis    Foot osteomyelitis, right    Nodule of groin    Type 2 diabetes mellitus with skin complication    Status post transmetatarsal amputation of right foot    Hammer toe of left foot    Hallux malleus of left foot    Cellulitis of left foot    Infected hardware in left leg    Chronic multifocal osteomyelitis of left foot    Charcot's joint of left foot    Skin ulcer of left foot, limited to breakdown of skin    Dyspnea    Syncope    Acute respiratory failure with hypoxia    Tachycardia    Neuropathy due to secondary diabetes mellitus    Atrial fibrillation    Presence of biventricular cardiac pacemaker    Hypertension    Medically complex patient    Fever of unknown origin    Bacteremia     Past Medical History:   Diagnosis Date    Arthritis     Atrial fibrillation     Diabetes mellitus     Diabetic foot ulcer associated with type 2 diabetes mellitus     left great toe    Hallux malleus of left foot     Hammer toe     Hypertension     MI (myocardial infarction)     Neuropathy in diabetes     Onychomycosis     Presence of biventricular cardiac pacemaker     Rheumatoid arthritis      Past Surgical History:   Procedure Laterality Date    AMPUTATION DIGIT Right 2017    Procedure: RIGHT AMPUTATION TRANSMETATRASAL , RECESSION GASTROCNEMOUS;  Surgeon: Marco A Thompson DPM;  Location: [facility];  Service:     ANKLE FUSION Left 2023    Procedure: REDUCTION OF LEFT ANKLE DEFORMITY WITH APPLICATION OF EXTERNAL  FIXATOR;  Surgeon: Marco A Thompson DPM;  Location: [facility] OR;  Service: Podiatry;  Laterality: Left;    CARDIAC CATHETERIZATION      CARDIAC DEFIBRILLATOR PLACEMENT  2010, 2016    CARPAL TUNNEL RELEASE  2015    elbow and wrist left arm    FOOT FUSION Left 05/15/2023    Procedure: REMOVAL OF EXTERNAL FIXATOR LEFT LOWER EXTREMITY WITH TIBIAL TALOCALCANEAL ARTHRODESIS  AND ALL INDICATED PROCEDURES;  Surgeon: Marco A Thompson DPM;  Location: [facility] OR;  Service: Podiatry;  Laterality: Left;    FOOT IRRIGATION, DEBRIDEMENT AND REPAIR Left 03/07/2018    Procedure: FOOT IRRIGATION, DEBRIDEMENT AND REPAIR;  Surgeon: Marco A Thompson DPM;  Location: [facility] OR;  Service:     FOOT IRRIGATION, DEBRIDEMENT AND REPAIR Left 03/10/2018    Procedure: FOOT IRRIGATION, DEBRIDEMENT AND REPAIR;  Surgeon: Marco A Thompson DPM;  Location: [facility] OR;  Service: Podiatry    FOOT WOUND CLOSURE Right 03/02/2017    Procedure: INCISION AND DRAINAGE, RIGHT FOOT;  Surgeon: Tung Rosenthal DPM;  Location: [facility] OR;  Service:     HAMMER TOE REPAIR Left 02/15/2018    Procedure: HAMMERTOE CORRECTION LEFT SECOND, THIRD AND FOURTH TOES AND HALLUX INTERPHALANGEAL JOINT ARTHRODESIS LEFT FOOT (Micro Asnis, 4.0 & 5.0 Asnis)      (c-arm);  Surgeon: Marco A Thompson DPM;  Location: [facility] OR;  Service:     HARDWARE REMOVAL Left 03/05/2018    Procedure: ANKLE/FOOT HARDWARE REMOVAL;  Surgeon: Marco A Thompson DPM;  Location: [facility] OR;  Service:     INCISION AND DRAINAGE LEG Left 03/05/2018    Procedure: INCISION AND DRAINAGE LOWER EXTREMITY;  Surgeon: Marco A Thompson DPM;  Location: [facility] OR;  Service:     PLANTAR FASCIA RELEASE Left 11/21/2019    Procedure: PLANTAR PLANING LEFT FOOT AND ALL OTHER INDICATED PROCEDURES;  Surgeon: Marco A Thompson DPM;  Location: [facility] OR;  Service: Podiatry    TOE AMPUTATION Right 2016    TONSILECTOMY, ADENOIDECTOMY, BILATERAL MYRINGOTOMY AND TUBES      age 25     PT Assessment (last 12 hours)       PT Evaluation and  Treatment       [facility] Name 06/06/23 1358          Physical Therapy Time and Intention    Subjective Information no complaints  -     Document Type therapy note (daily note)  -     Mode of Treatment physical therapy;individual therapy  -     Patient Effort good  -[facility] Name 06/06/23 1358          General Information    Patient Profile Reviewed yes  -     Existing Precautions/Restrictions fall;weight bearing;other (see comments)  WBAT.  -[facility] Name 06/06/23 1358          Pain    Pre/Posttreatment Pain Comment pt states he has chronic neck pain  -[facility] Name 06/06/23 1358          Cognition    Orientation Status (Cognition) oriented x 4  -[facility] Name 06/06/23 1358          Range of Motion Comprehensive    General Range of Motion --  -[facility] Name 06/06/23 1358          Mobility    Extremity Weight-bearing Status left lower extremity  -     Left Lower Extremity (Weight-bearing Status) weight-bearing as tolerated (WBAT)  -[facility] Name 06/06/23 1358          Bed Mobility    Supine-Sit [place] (Bed Mobility) supervision  -     Sit-Supine [place] (Bed Mobility) supervision  -     Assistive Device (Bed Mobility) head of bed elevated;bed rails  -JW       Row Name 06/06/23 1358          Transfers    Transfers sit-stand transfer;stand-sit transfer  -JW       Row Name 06/06/23 1358          Sit-Stand Transfer    Sit-Stand [place] (Transfers) contact guard  -JW     Assistive Device (Sit-Stand Transfers) walker, front-wheeled  [facility]     Comment, (Sit-Stand Transfer) pt peformed 5 sit<>stands  -JW       Row Name 06/06/23 1358          Stand-Sit Transfer    Stand-Sit [place] (Transfers) contact guard  -JW     Assistive Device (Stand-Sit Transfers) walker, front-wheeled  -JW       Row Name 06/06/23 1358          Gait/Stairs (Locomotion)    [place] Level (Gait) contact guard  -JW     Assistive Device (Gait) walker front-wheelagustin  [facility]     Distance in Feet (Gait) 5'  x 4 sidestepping at EOB  -JW       Row Name 06/06/23 1358          Safety Issues, Functional Mobility    Impairments Affecting Function (Mobility) strength;endurance/activity tolerance;sensation/sensory awareness;balance  -JW       Row Name 06/06/23 1358          Motor Skills    Comments, Therapeutic Exercise B LE seated ther ex:  LAQ, GS, hip flexion, add pillow squeeze x 15'  -JW       Row Name             Wound 06/03/23 0920 Left lower leg    Wound - Properties Group Placement Date: 06/03/23  -LW Placement Time: 0920  -LW Side: Left  -LW Orientation: lower  -LW Location: leg  -LW    Retired Wound - Properties Group Placement Date: 06/03/23  -LW Placement Time: 0920  -LW Side: Left  -LW Orientation: lower  -LW Location: leg  -LW    Retired Wound - Properties Group Date first assessed: 06/03/23  -LW Time first assessed: 0920  -LW Side: Left  -LW Location: leg  -LW      Row Name             Wound 06/03/23 0922 Left posterior heel    Wound - Properties Group Placement Date: 06/03/23  -LW Placement Time: 0922  -LW Side: Left  -LW Orientation: posterior  -LW Location: heel  -LW    Retired Wound - Properties Group Placement Date: 06/03/23  -LW Placement Time: 0922  -LW Side: Left  -LW Orientation: posterior  -LW Location: heel  -LW    Retired Wound - Properties Group Date first assessed: 06/03/23  -LW Time first assessed: 0922  -LW Side: Left  -LW Location: heel  -LW      Row Name             Wound 06/03/23 0923 Left anterior ankle    Wound - Properties Group Placement Date: 06/03/23  -LW Placement Time: 0923  -LW Side: Left  -LW Orientation: anterior  -LW Location: ankle  -LW    Retired Wound - Properties Group Placement Date: 06/03/23  -LW Placement Time: 0923  -LW Side: Left  -LW Orientation: anterior  -LW Location: ankle  -LW    Retired Wound - Properties Group Date first assessed: 06/03/23  -LW Time first assessed: 0923  -LW Side: Left  -LW Location: ankle  -LW      Row Name             Wound 04/14/23 2157 Left  medial gluteal    Wound - Properties Group Placement Date: 04/14/23  -ML Placement Time: 2157  -ML Present on Hospital Admission: Y  -ML, it was passed on in report pt came in with wound, pt stated wound was present on admission  Side: Left  -ML Orientation: medial  -ML Location: gluteal  -ML    Retired Wound - Properties Group Placement Date: 04/14/23  -ML Placement Time: 2157  -ML Present on Hospital Admission: Y  -ML, it was passed on in report pt came in with wound, pt stated wound was present on admission  Side: Left  -ML Orientation: medial  -ML Location: gluteal  -ML    Retired Wound - Properties Group Date first assessed: 04/14/23  -ML Time first assessed: 2157  -ML Present on Hospital Admission: Y  -ML, it was passed on in report pt came in with wound, pt stated wound was present on admission  Side: Left  -ML Location: gluteal  -ML      Row Name             Wound 05/15/23 1546 Left anterior foot Incision    Wound - Properties Group Placement Date: 05/15/23  -HP Placement Time: 1546  -HP Present on Hospital Admission: Y  -HP Side: Left  -HP Orientation: anterior  -HP Location: foot  -HP Primary Wound Type: Incision  -HP Additional Comments: adaptic, bacitracin ointment, 4x4, abd, webril, ace, boot  -HP    Retired Wound - Properties Group Placement Date: 05/15/23  -HP Placement Time: 1546  -HP Present on Hospital Admission: Y  -HP Side: Left  -HP Orientation: anterior  -HP Location: foot  -HP Primary Wound Type: Incision  -HP Additional Comments: adaptic, bacitracin ointment, 4x4, abd, webril, ace, boot  -HP    Retired Wound - Properties Group Date first assessed: 05/15/23  -HP Time first assessed: 1546  -HP Present on Hospital Admission: Y  -HP Side: Left  -HP Location: foot  -HP Primary Wound Type: Incision  -HP Additional Comments: adaptic, bacitracin ointment, 4x4, abd, webril, ace, boot  -HP      Row Name 06/06/23 1358          Plan of Care Review    Plan of Care Reviewed With patient  -JW     Outcome  Evaluation pt t/fers sup<>sit w/ SBA, sit<>stand w/ CGA w/ RW, pt ambulates ~5' x 4 w/ RW w/ CGA, pt performs B LE seated ther ex x 15 reps  -       Row Name 06/06/23 1359          Vital Signs    Pre Systolic BP Rehab 111  -JW     Pre Treatment Diastolic BP 76  -JW     Pretreatment Heart Rate (beats/min) 101  -JW     Pre SpO2 (%) 99  -JW     O2 Delivery Pre Treatment room air  -JW     Pre Patient Position Supine  -JW     Intra Patient Position Standing  -JW     Post Patient Position Supine  -JW       Row Name 06/06/23 1358          Positioning and Restraints    Pre-Treatment Position in bed  -JW     Post Treatment Position bed  -JW     In Bed fowlers;call light within reach;encouraged to call for assist;LLE elevated  CAM boot on L LE, pillows under B hips to float  -       Row Name 06/06/23 1351          Therapy Assessment/Plan (PT)    Rehab Potential (PT) good, to achieve stated therapy goals  -     Criteria for Skilled Interventions Met (PT) yes;skilled treatment is necessary  -     Therapy Frequency (PT) other (see comments)  5-7 days/week  -       Row Name 06/06/23 9989          Bed Mobility Goal 1 (PT)    Activity/Assistive Device (Bed Mobility Goal 1, PT) sit to supine/supine to sit  -     [place] Level/Cues Needed (Bed Mobility Goal 1, PT) independent  -     Time Frame (Bed Mobility Goal 1, PT) by discharge  -     Strategies/Barriers (Bed Mobility Goal 1, PT) HOB flat, no bed rails.  -     Progress/Outcomes (Bed Mobility Goal 1, PT) goal not met  -       Row Name 06/06/23 1355          Transfer Goal 1 (PT)    Activity/Assistive Device (Transfer Goal 1, PT) sit-to-stand/stand-to-sit;bed-to-chair/chair-to-bed;walker, rolling  -     [place] Level/Cues Needed (Transfer Goal 1, PT) modified independence  -     Time Frame (Transfer Goal 1, PT) by discharge  -     Strategies/Barriers (Transfers Goal 1, PT) R orthopedic shoe, L camboot.  -     Progress/Outcome (Transfer Goal 1,  PT) goal not met  -       Row Name 06/06/23 4859          Gait Training Goal 1 (PT)    Activity/Assistive Device (Gait Training Goal 1, PT) walker, rolling  -     Distance (Gait Training Goal 1, PT) 25'x2.  -     Time Frame (Gait Training Goal 1, PT) by discharge  -     Strategies/Barriers (Gait Training Goal 1, PT) R orthopedic shoe, L camboot.  -     Progress/Outcome (Gait Training Goal 1, PT) goal not met  -               User Key  (r) = Recorded By, (t) = Taken By, (c) = Cosigned By      Initials Name Provider Type    JW Lore Phan, PTA Physical Therapist Assistant    ML Jennifer Leung, RN Registered Nurse    Maryann De La Cruz RN Registered Nurse    Charley Chowdary RN Registered Nurse                    Physical Therapy Education       Title: PT OT SLP Therapies (In Progress)       Topic: Physical Therapy (Not Started)       Point: Mobility training (Done)       Learning Progress Summary             Patient Acceptance, E, VU by  at 6/5/2023 1309    Comment: PT POC, hand placement with transfers, bariatric FWW, rehab process.                         Point: Home exercise program (Not Started)       Learner Progress:  Not documented in this visit.              Point: Body mechanics (Not Started)       Learner Progress:  Not documented in this visit.                              User Key       Initials Effective Dates Name Provider Type [facility] 09/18/22 -  Lei Lopez, PT Physical Therapist PT                  PT Recommendation and Plan  Anticipated Discharge Disposition (PT): inpatient rehabilitation facility  Therapy Frequency (PT): other (see comments) (5-7 days/week)  Plan of Care Reviewed With: patient  Outcome Evaluation: pt t/fers sup<>sit w/ SBA, sit<>stand w/ CGA w/ RW, pt ambulates ~5' x 4 w/ RW w/ CGA, pt performs B LE seated ther ex x 15 reps       Time Calculation:    PT Charges       Row Name 06/06/23 3892             Time Calculation    Start Time 4112   -      Stop Time 1446  -      Time Calculation (min) 48 min  -JW      PT Received On 06/06/23  -         Time Calculation- PT    Total Timed Code Minutes- PT 48 minute(s)  -                User Key  (r) = Recorded By, (t) = Taken By, (c) = Cosigned By      Initials Name Provider Type    Lore Sebastian, PTA Physical Therapist Assistant                      PT G-Codes  Outcome Measure Options: AM-PAC 6 Clicks Daily Activity (OT)  AM-PAC 6 Clicks Score (PT): 17  AM-PAC 6 Clicks Score (OT): 17    Lore Phan PTA  6/6/2023

## 2023-06-06 NOTE — PROGRESS NOTES
Podiatric Surgery Progress Note    Subjective     Post-Op left tibiocalcaneal arthrodesis with hindfoot nail.  Date of surgery May 15, 2023.  Systemic or Specific Complaints: No Complaints    Objective     Vital signs in last 24 hours:  Temp:  [98.2 °F (36.8 °C)-98.6 °F (37 °C)] 98.6 °F (37 °C)  Heart Rate:  [58-94] 72  Resp:  [18] 18  BP: (107-121)/(64-70) 109/65    General: alert, appears stated age, and cooperative   Neurovascular: SILT  Capillary refill: Normal   Wound: Wound clean and dry no evidence of infection.   Range of Motion: Limited dorsiflexion, Limited plantarflexion, Limited inversion, and Limited eversion   DVT Exam: No evidence of DVT seen on physical exam.     Data Review  CBC:  Results from last 7 days   Lab Units 06/06/23  0619   WBC 10*3/mm3 4.33   RBC 10*6/mm3 4.24   HEMOGLOBIN g/dL 11.7*   HEMATOCRIT % 37.1*   PLATELETS 10*3/mm3 176       Assessment & Plan     Charcot Left Lower Extremity     Patient examined and evaluated.  Patient is currently admitted and bacteremic.  No clinical signs of infection to left lower extremity.  Would recommend treating with IV antibiotics x2 weeks followed by a course of oral antibiotics.  Recommend consult to LTAC placement.  Patient can be weightbearing as tolerated in cam boot with walker to left lower extremity.  Wound nurse to change Unna boot.  All of his questions were answered.  Call with questions.     LOS: 1 day     Marco A Thompson DPM    Date: 6/6/2023  Time: 07:49 CDT

## 2023-06-06 NOTE — PLAN OF CARE
Problem: Adult Inpatient Plan of Care  Goal: Plan of Care Review  Outcome: Ongoing, Progressing  Flowsheets (Taken 6/6/2023 1241)  Plan of Care Reviewed With: patient   Goal Outcome Evaluation:  Plan of Care Reviewed With: patient         RD follow up.  Pt d/c from ARU on 6/3 and admitted to ED then to acute care.  Pt was admitted with fever of unknown origin and bacteremia.  Pt reports meals have been going well but not always getting foods he likes.  Pt gave preferences and RD will add to diet message.  Noted edema:  3 L leg, 2 R leg, 4 L ankle, 4 L foot.  Last BM 6/5.  Noted wounds:  L lower leg, L posterior heel, L anterior ankle, L anterior foot incision, L medial gluteal.  Pt continues to wear boot on foot.

## 2023-06-07 ENCOUNTER — APPOINTMENT (OUTPATIENT)
Dept: ULTRASOUND IMAGING | Facility: HOSPITAL | Age: 63
End: 2023-06-07
Payer: MEDICARE

## 2023-06-07 ENCOUNTER — APPOINTMENT (OUTPATIENT)
Dept: INTERVENTIONAL RADIOLOGY/VASCULAR | Facility: HOSPITAL | Age: 63
End: 2023-06-07
Payer: MEDICARE

## 2023-06-07 LAB
ALBUMIN SERPL-MCNC: 3.8 G/DL (ref 3.5–5.2)
ALBUMIN/GLOB SERPL: 1.1 G/DL
ALP SERPL-CCNC: 104 U/L (ref 39–117)
ALT SERPL W P-5'-P-CCNC: 30 U/L (ref 1–41)
ANION GAP SERPL CALCULATED.3IONS-SCNC: 11 MMOL/L (ref 5–15)
AST SERPL-CCNC: 31 U/L (ref 1–40)
BASOPHILS # BLD AUTO: 0.04 10*3/MM3 (ref 0–0.2)
BASOPHILS NFR BLD AUTO: 0.8 % (ref 0–1.5)
BILIRUB SERPL-MCNC: 0.7 MG/DL (ref 0–1.2)
BUN SERPL-MCNC: 32 MG/DL (ref 8–23)
BUN/CREAT SERPL: 25 (ref 7–25)
CALCIUM SPEC-SCNC: 9.1 MG/DL (ref 8.6–10.5)
CHLORIDE SERPL-SCNC: 98 MMOL/L (ref 98–107)
CO2 SERPL-SCNC: 28 MMOL/L (ref 22–29)
CREAT SERPL-MCNC: 1.28 MG/DL (ref 0.76–1.27)
DEPRECATED RDW RBC AUTO: 46.3 FL (ref 37–54)
EGFRCR SERPLBLD CKD-EPI 2021: 63.7 ML/MIN/1.73
EOSINOPHIL # BLD AUTO: 0.17 10*3/MM3 (ref 0–0.4)
EOSINOPHIL NFR BLD AUTO: 3.3 % (ref 0.3–6.2)
ERYTHROCYTE [DISTWIDTH] IN BLOOD BY AUTOMATED COUNT: 14.7 % (ref 12.3–15.4)
GLOBULIN UR ELPH-MCNC: 3.4 GM/DL
GLUCOSE BLDC GLUCOMTR-MCNC: 136 MG/DL (ref 70–130)
GLUCOSE BLDC GLUCOMTR-MCNC: 186 MG/DL (ref 70–130)
GLUCOSE BLDC GLUCOMTR-MCNC: 221 MG/DL (ref 70–130)
GLUCOSE BLDC GLUCOMTR-MCNC: 257 MG/DL (ref 70–130)
GLUCOSE SERPL-MCNC: 140 MG/DL (ref 65–99)
HCT VFR BLD AUTO: 35.1 % (ref 37.5–51)
HGB BLD-MCNC: 11.2 G/DL (ref 13–17.7)
IMM GRANULOCYTES # BLD AUTO: 0.03 10*3/MM3 (ref 0–0.05)
IMM GRANULOCYTES NFR BLD AUTO: 0.6 % (ref 0–0.5)
LYMPHOCYTES # BLD AUTO: 1.57 10*3/MM3 (ref 0.7–3.1)
LYMPHOCYTES NFR BLD AUTO: 30.7 % (ref 19.6–45.3)
MAGNESIUM SERPL-MCNC: 2.5 MG/DL (ref 1.6–2.4)
MCH RBC QN AUTO: 27.3 PG (ref 26.6–33)
MCHC RBC AUTO-ENTMCNC: 31.9 G/DL (ref 31.5–35.7)
MCV RBC AUTO: 85.6 FL (ref 79–97)
MONOCYTES # BLD AUTO: 0.54 10*3/MM3 (ref 0.1–0.9)
MONOCYTES NFR BLD AUTO: 10.6 % (ref 5–12)
NEUTROPHILS NFR BLD AUTO: 2.76 10*3/MM3 (ref 1.7–7)
NEUTROPHILS NFR BLD AUTO: 54 % (ref 42.7–76)
NRBC BLD AUTO-RTO: 0 /100 WBC (ref 0–0.2)
PLATELET # BLD AUTO: 193 10*3/MM3 (ref 140–450)
PMV BLD AUTO: 10.1 FL (ref 6–12)
POTASSIUM SERPL-SCNC: 4 MMOL/L (ref 3.5–5.2)
PROT SERPL-MCNC: 7.2 G/DL (ref 6–8.5)
RBC # BLD AUTO: 4.1 10*6/MM3 (ref 4.14–5.8)
SARS-COV-2 RNA RESP QL NAA+PROBE: NOT DETECTED
SODIUM SERPL-SCNC: 137 MMOL/L (ref 136–145)
WBC NRBC COR # BLD: 5.11 10*3/MM3 (ref 3.4–10.8)

## 2023-06-07 PROCEDURE — C1751 CATH, INF, PER/CENT/MIDLINE: HCPCS

## 2023-06-07 PROCEDURE — 82948 REAGENT STRIP/BLOOD GLUCOSE: CPT

## 2023-06-07 PROCEDURE — 87635 SARS-COV-2 COVID-19 AMP PRB: CPT | Performed by: STUDENT IN AN ORGANIZED HEALTH CARE EDUCATION/TRAINING PROGRAM

## 2023-06-07 PROCEDURE — 05HB33Z INSERTION OF INFUSION DEVICE INTO RIGHT BASILIC VEIN, PERCUTANEOUS APPROACH: ICD-10-PCS | Performed by: HOSPITALIST

## 2023-06-07 PROCEDURE — 83735 ASSAY OF MAGNESIUM: CPT | Performed by: HOSPITALIST

## 2023-06-07 PROCEDURE — 97116 GAIT TRAINING THERAPY: CPT

## 2023-06-07 PROCEDURE — 76937 US GUIDE VASCULAR ACCESS: CPT

## 2023-06-07 PROCEDURE — 36410 VNPNXR 3YR/> PHY/QHP DX/THER: CPT

## 2023-06-07 PROCEDURE — 97535 SELF CARE MNGMENT TRAINING: CPT

## 2023-06-07 PROCEDURE — 25010000002 FUROSEMIDE PER 20 MG: Performed by: HOSPITALIST

## 2023-06-07 PROCEDURE — 63710000001 INSULIN ASPART PER 5 UNITS: Performed by: HOSPITALIST

## 2023-06-07 PROCEDURE — 85025 COMPLETE CBC W/AUTO DIFF WBC: CPT | Performed by: HOSPITALIST

## 2023-06-07 PROCEDURE — 80053 COMPREHEN METABOLIC PANEL: CPT | Performed by: HOSPITALIST

## 2023-06-07 PROCEDURE — 25010000002 PIPERACILLIN SOD-TAZOBACTAM PER 1 G: Performed by: INTERNAL MEDICINE

## 2023-06-07 PROCEDURE — 97530 THERAPEUTIC ACTIVITIES: CPT

## 2023-06-07 RX ORDER — FLUTICASONE PROPIONATE 50 MCG
2 SPRAY, SUSPENSION (ML) NASAL DAILY
Status: DISCONTINUED | OUTPATIENT
Start: 2023-06-08 | End: 2023-06-08 | Stop reason: HOSPADM

## 2023-06-07 RX ORDER — ECHINACEA PURPUREA EXTRACT 125 MG
1 TABLET ORAL AS NEEDED
Status: DISCONTINUED | OUTPATIENT
Start: 2023-06-07 | End: 2023-06-07

## 2023-06-07 RX ADMIN — APIXABAN 5 MG: 5 TABLET, FILM COATED ORAL at 09:00

## 2023-06-07 RX ADMIN — THERA TABS 1 TABLET: TAB at 20:10

## 2023-06-07 RX ADMIN — INSULIN ASPART 2 UNITS: 100 INJECTION, SOLUTION INTRAVENOUS; SUBCUTANEOUS at 17:43

## 2023-06-07 RX ADMIN — Medication 400 MG: at 20:11

## 2023-06-07 RX ADMIN — PIPERACILLIN SODIUM AND TAZOBACTAM SODIUM 3.38 G: 3; .375 INJECTION, POWDER, LYOPHILIZED, FOR SOLUTION INTRAVENOUS at 14:52

## 2023-06-07 RX ADMIN — DOCUSATE SODIUM 50 MG AND SENNOSIDES 8.6 MG 2 TABLET: 8.6; 5 TABLET, FILM COATED ORAL at 08:59

## 2023-06-07 RX ADMIN — Medication 5000 UNITS: at 20:10

## 2023-06-07 RX ADMIN — Medication 10 ML: at 05:38

## 2023-06-07 RX ADMIN — ACETAMINOPHEN 650 MG: 325 TABLET, FILM COATED ORAL at 14:59

## 2023-06-07 RX ADMIN — Medication 400 MG: at 09:01

## 2023-06-07 RX ADMIN — PIPERACILLIN SODIUM AND TAZOBACTAM SODIUM 3.38 G: 3; .375 INJECTION, POWDER, LYOPHILIZED, FOR SOLUTION INTRAVENOUS at 05:38

## 2023-06-07 RX ADMIN — METOPROLOL TARTRATE 25 MG: 25 TABLET, FILM COATED ORAL at 20:11

## 2023-06-07 RX ADMIN — OXYCODONE HYDROCHLORIDE AND ACETAMINOPHEN 1000 MG: 500 TABLET ORAL at 20:11

## 2023-06-07 RX ADMIN — FUROSEMIDE 40 MG: 10 INJECTION, SOLUTION INTRAVENOUS at 09:01

## 2023-06-07 RX ADMIN — Medication 10 ML: at 09:02

## 2023-06-07 RX ADMIN — DOCUSATE SODIUM 50 MG AND SENNOSIDES 8.6 MG 2 TABLET: 8.6; 5 TABLET, FILM COATED ORAL at 20:10

## 2023-06-07 RX ADMIN — APIXABAN 5 MG: 5 TABLET, FILM COATED ORAL at 20:11

## 2023-06-07 RX ADMIN — INSULIN ASPART 5 UNITS: 100 INJECTION, SOLUTION INTRAVENOUS; SUBCUTANEOUS at 12:43

## 2023-06-07 RX ADMIN — PIPERACILLIN SODIUM AND TAZOBACTAM SODIUM 3.38 G: 3; .375 INJECTION, POWDER, LYOPHILIZED, FOR SOLUTION INTRAVENOUS at 22:44

## 2023-06-07 RX ADMIN — DRONEDARONE 400 MG: 400 TABLET, FILM COATED ORAL at 20:11

## 2023-06-07 RX ADMIN — Medication 10 ML: at 20:12

## 2023-06-07 NOTE — THERAPY TREATMENT NOTE
Patient Name: Emre Lisa  : 1960    MRN: [id number]                              Today's Date: 2023       Admit Date: 6/3/2023    Visit Dx:     ICD-10-CM ICD-9-CM   1. Fever of unknown origin  R50.9 780.60   2. Other specified hypotension  I95.89 458.8   3. Impaired mobility and ADLs [Z74.09, Z78.9 (ICD-10-CM)]  Z74.09 V49.89    Z78.9    4. Impaired functional mobility, balance, gait, and endurance [Z74.09 (ICD-10-CM)]  Z74.09 V49.89     Patient Active Problem List   Diagnosis    Foot osteomyelitis, right    Nodule of groin    Type 2 diabetes mellitus with skin complication    Status post transmetatarsal amputation of right foot    Hammer toe of left foot    Hallux malleus of left foot    Cellulitis of left foot    Infected hardware in left leg    Chronic multifocal osteomyelitis of left foot    Charcot's joint of left foot    Skin ulcer of left foot, limited to breakdown of skin    Dyspnea    Syncope    Acute respiratory failure with hypoxia    Tachycardia    Neuropathy due to secondary diabetes mellitus    Atrial fibrillation    Presence of biventricular cardiac pacemaker    Hypertension    Medically complex patient    Fever of unknown origin    Bacteremia     Past Medical History:   Diagnosis Date    Arthritis     Atrial fibrillation     Diabetes mellitus     Diabetic foot ulcer associated with type 2 diabetes mellitus     left great toe    Hallux malleus of left foot     Hammer toe     Hypertension     MI (myocardial infarction)     Neuropathy in diabetes     Onychomycosis     Presence of biventricular cardiac pacemaker     Rheumatoid arthritis      Past Surgical History:   Procedure Laterality Date    AMPUTATION DIGIT Right 2017    Procedure: RIGHT AMPUTATION TRANSMETATRASAL , RECESSION GASTROCNEMOUS;  Surgeon: Marco A Thompson DPM;  Location: [facility];  Service:     ANKLE FUSION Left 2023    Procedure: REDUCTION OF LEFT ANKLE DEFORMITY WITH APPLICATION OF EXTERNAL FIXATOR;   Surgeon: Marco A Thompson DPM;  Location: [facility] OR;  Service: Podiatry;  Laterality: Left;    CARDIAC CATHETERIZATION      CARDIAC DEFIBRILLATOR PLACEMENT  2010, 2016    CARPAL TUNNEL RELEASE  2015    elbow and wrist left arm    FOOT FUSION Left 05/15/2023    Procedure: REMOVAL OF EXTERNAL FIXATOR LEFT LOWER EXTREMITY WITH TIBIAL TALOCALCANEAL ARTHRODESIS  AND ALL INDICATED PROCEDURES;  Surgeon: Marco A Thompson DPM;  Location: [facility] OR;  Service: Podiatry;  Laterality: Left;    FOOT IRRIGATION, DEBRIDEMENT AND REPAIR Left 03/07/2018    Procedure: FOOT IRRIGATION, DEBRIDEMENT AND REPAIR;  Surgeon: Marco A Thompson DPM;  Location: [facility] OR;  Service:     FOOT IRRIGATION, DEBRIDEMENT AND REPAIR Left 03/10/2018    Procedure: FOOT IRRIGATION, DEBRIDEMENT AND REPAIR;  Surgeon: Marco A Thompson DPM;  Location: [facility] OR;  Service: Podiatry    FOOT WOUND CLOSURE Right 03/02/2017    Procedure: INCISION AND DRAINAGE, RIGHT FOOT;  Surgeon: Tung Rosenthal DPM;  Location: [facility] OR;  Service:     HAMMER TOE REPAIR Left 02/15/2018    Procedure: HAMMERTOE CORRECTION LEFT SECOND, THIRD AND FOURTH TOES AND HALLUX INTERPHALANGEAL JOINT ARTHRODESIS LEFT FOOT (Micro Asnis, 4.0 & 5.0 Asnis)      (c-arm);  Surgeon: Marco A Thompson DPM;  Location: [facility] OR;  Service:     HARDWARE REMOVAL Left 03/05/2018    Procedure: ANKLE/FOOT HARDWARE REMOVAL;  Surgeon: Marco A Thompson DPM;  Location: [facility] OR;  Service:     INCISION AND DRAINAGE LEG Left 03/05/2018    Procedure: INCISION AND DRAINAGE LOWER EXTREMITY;  Surgeon: Marco A Thompson DPM;  Location: [facility] OR;  Service:     PLANTAR FASCIA RELEASE Left 11/21/2019    Procedure: PLANTAR PLANING LEFT FOOT AND ALL OTHER INDICATED PROCEDURES;  Surgeon: Marco A Thompson DPM;  Location: [facility] OR;  Service: Podiatry    TOE AMPUTATION Right 2016    TONSILECTOMY, ADENOIDECTOMY, BILATERAL MYRINGOTOMY AND TUBES      age 25      General Information       Row Name 06/07/23 7163          OT Time and  Intention    Document Type therapy note (daily note)  -     Mode of Treatment individual therapy;occupational therapy  -       Row Name 06/07/23 1405          General Information    Existing Precautions/Restrictions fall;weight bearing;other (see comments)  -       Row Name 06/07/23 1405          Cognition    Orientation Status (Cognition) oriented x 4  -KD       Row Name 06/07/23 1405          Safety Issues, Functional Mobility    Impairments Affecting Function (Mobility) strength;endurance/activity tolerance;sensation/sensory awareness;balance  -               User Key  (r) = Recorded By, (t) = Taken By, (c) = Cosigned By      Initials Name Provider Type     Kim Fernandez COTA Occupational Therapist Assistant                     Mobility/ADL's       Row Name 06/07/23 1405          Bed Mobility    Supine-Sit [place] (Bed Mobility) supervision  -     Sit-Supine [place] (Bed Mobility) supervision  -     Assistive Device (Bed Mobility) head of bed elevated;bed rails  -       Row Name 06/07/23 1405          Sit-Stand Transfer    Sit-Stand [place] (Transfers) contact guard  -     Assistive Device (Sit-Stand Transfers) walker, front-wheeled  -KD       Row Name 06/07/23 1405          Stand-Sit Transfer    Stand-Sit [place] (Transfers) contact guard  -KD     Assistive Device (Stand-Sit Transfers) walker, front-wheeled  -KD       Row Name 06/07/23 1405          Functional Mobility    Functional Mobility- Ind. Level contact guard assist  -KD     Functional Mobility- Device walker, 4-wheeled  -KD     Functional Mobility-Distance (Feet) 20  42, 44, 74, 64, 88 w/ sitting RB's between each distance  -       Row Name 06/07/23 1405          Activities of Daily Living    BADL Assessment/Intervention grooming  -       Row Name 06/07/23 1405          Mobility    Extremity Weight-bearing Status left lower extremity  -KD     Left Lower Extremity (Weight-bearing Status) weight-bearing as  tolerated (WBAT)  -KD       Row Name 06/07/23 1405          Grooming Assessment/Training    [place] Level (Grooming) grooming skills;oral care regimen;set up  -KD     Position (Grooming) edge of bed sitting  -KD               User Key  (r) = Recorded By, (t) = Taken By, (c) = Cosigned By      Initials Name Provider Type    Kim Horowitz COTA Occupational Therapist Assistant                   Obj/Interventions    No documentation.                  Goals/Plan       Row Name 06/07/23 1405          Transfer Goal 1 (OT)    Activity/Assistive Device (Transfer Goal 1, OT) bed-to-chair/chair-to-bed;wheelchair transfer;commode, bedside without drop arms;commode, bedside with drop arms;walker, rolling  -KD     [place] Level/Cues Needed (Transfer Goal 1, OT) modified independence  -KD     Time Frame (Transfer Goal 1, OT) long term goal (LTG);by discharge  -KD     Progress/Outcome (Transfer Goal 1, OT) goal not met  -KD       Row Name 06/07/23 1405          Bathing Goal 1 (OT)    Activity/Device (Bathing Goal 1, OT) lower body bathing  -KD     [place] Level/Cues Needed (Bathing Goal 1, OT) modified independence  -KD     Time Frame (Bathing Goal 1, OT) long term goal (LTG);by discharge  -KD     Progress/Outcomes (Bathing Goal 1, OT) goal not met  -KD       Row Name 06/07/23 1405          Dressing Goal 1 (OT)    Activity/Device (Dressing Goal 1, OT) lower body dressing  -KD     [place]/Cues Needed (Dressing Goal 1, OT) modified independence  -KD     Time Frame (Dressing Goal 1, OT) long term goal (LTG);by discharge  -KD     Progress/Outcome (Dressing Goal 1, OT) goal not met  -KD       Row Name 06/07/23 1405          Toileting Goal 1 (OT)    Activity/Device (Toileting Goal 1, OT) toileting skills, all  -KD     [place] Level/Cues Needed (Toileting Goal 1, OT) modified independence  -KD     Time Frame (Toileting Goal 1, OT) long term goal (LTG);by discharge  -KD     Progress/Outcome (Toileting Goal 1,  OT) goal not met  -KD       Row Name 06/07/23 1405           Activity Tolerance Goal 1 (OT)    Activity Level (Endurance Goal 1, OT) 15 min activity  -KD     Progress/Outcome (Activity Tolerance Goal 1, OT) goal not met  -KD               User Key  (r) = Recorded By, (t) = Taken By, (c) = Cosigned By      Initials Name Provider Type     Kim Fernandez COTA Occupational Therapist Assistant                   Clinical Impression       Row Name 06/07/23 1405          Pain Assessment    Pretreatment Pain Rating 3/10  -KD     Posttreatment Pain Rating 3/10  -KD     Pain Location - abdomen  -KD     Pain Intervention(s) Nursing Notified  -KD       Row Name 06/07/23 1405          Plan of Care Review    Plan of Care Reviewed With patient  -KD     Progress improving  -KD     Outcome Evaluation Pt tolerated tx well this date. Sup>sit-SBA. Pt sat EOB and completed grooming tasks (brush teeth) w/ set up only. Sit>stand-CGA. Fxl mobility- 20' 42', 44', 74' 64' and 88 ft w/ sitting rb's between each distance. Pt then returned to supine position w/ SBA. Pt w/ all needs in reach upon exit. Cont OT POC.  -KD       Row Name 06/07/23 1405          Therapy Assessment/Plan (OT)    Therapy Frequency (OT) other (see comments)  5-7 d/wk  -KD       Row Name 06/07/23 1405          Therapy Plan Review/Discharge Plan (OT)    Anticipated Discharge Disposition (OT) inpatient rehabilitation facility  -KD       Row Name 06/07/23 1405          Vital Signs    Pre Systolic BP Rehab 119  -KD     Pre Treatment Diastolic BP 55  -KD     Pretreatment Heart Rate (beats/min) 92  -KD     Pre SpO2 (%) 98  -KD     O2 Delivery Pre Treatment room air  -KD     Pre Patient Position Supine  -KD     Intra Patient Position Standing  -KD     Post Patient Position Supine  -KD       Row Name 06/07/23 1409          Positioning and Restraints    Pre-Treatment Position in bed  -KD     Post Treatment Position bed  -KD     In Bed fowlers;call light within reach;encouraged  to call for assist;exit alarm on  -KD               User Key  (r) = Recorded By, (t) = Taken By, (c) = Cosigned By      Initials Name Provider Type    Kim Horowitz COTA Occupational Therapist Assistant                   Outcome Measures       Row Name 06/07/23 1405          How much help from another is currently needed...    Putting on and taking off regular lower body clothing? 2  -KD     Bathing (including washing, rinsing, and drying) 2  -KD     Toileting (which includes using toilet bed pan or urinal) 2  -KD     Putting on and taking off regular upper body clothing 3  -KD     Taking care of personal grooming (such as brushing teeth) 4  -KD     Eating meals 4  -KD     AM-PAC 6 Clicks Score (OT) 17  -KD       Row Name 06/07/23 1014 06/07/23 1000       How much help from another person do you currently need...    Turning from your back to your side while in flat bed without using bedrails? 3  -MOHSEN 3  -MB    Moving from lying on back to sitting on the side of a flat bed without bedrails? 3  -MOHSEN 3  -MB    Moving to and from a bed to a chair (including a wheelchair)? 3  -MOHSEN 3  -MB    Standing up from a chair using your arms (e.g., wheelchair, bedside chair)? 3  -MOHSEN 3  -MB    Climbing 3-5 steps with a railing? 2  -MOHSEN 2  -MB    To walk in hospital room? 3  -MOHSEN 3  -MB    AM-PAC 6 Clicks Score (PT) 17  -MOHSEN 17  -MB    Highest level of mobility 5 --> Static standing  -MOHSEN 5 --> Static standing  -MB      Row Name 06/07/23 1014          Functional Assessment    Outcome Measure Options AM-PAC 6 Clicks Basic Mobility (PT)  -MOHSEN               User Key  (r) = Recorded By, (t) = Taken By, (c) = Cosigned By      Initials Name Provider Type    Racquel Mancilla, RN Registered Nurse    Bladimir Michelle PTA Physical Therapist Assistant    Kim Horowitz COTA Occupational Therapist Assistant                    Occupational Therapy Education       Title: PT OT SLP Therapies (In Progress)       Topic: Occupational Therapy  (Done)       Point: ADL training (Done)       Description:   Instruct learner(s) on proper safety adaptation and remediation techniques during self care or transfers.   Instruct in proper use of assistive devices.                  Learning Progress Summary             Patient Acceptance, E,TB, VU by  at 6/3/2023 1503    Comment: OT POC, Role of OT, transfer training   Significant Other Acceptance, E,TB, VU by  at 6/3/2023 1503    Comment: OT POC, Role of OT, transfer training                         Point: Precautions (Done)       Description:   Instruct learner(s) on prescribed precautions during self-care and functional transfers.                  Learning Progress Summary             Patient Acceptance, E,TB, VU by  at 6/3/2023 1503    Comment: OT POC, Role of OT, transfer training   Significant Other Acceptance, E,TB, VU by  at 6/3/2023 1503    Comment: OT POC, Role of OT, transfer training                         Point: Body mechanics (Done)       Description:   Instruct learner(s) on proper positioning and spine alignment during self-care, functional mobility activities and/or exercises.                  Learning Progress Summary             Patient Acceptance, E,TB, VU by  at 6/3/2023 1503    Comment: OT POC, Role of OT, transfer training   Significant Other Acceptance, E,TB, VU by  at 6/3/2023 1503    Comment: OT POC, Role of OT, transfer training                                         User Key       Initials Effective Dates Name Provider Type Discipline     08/11/22 -  Estephania Arnett OT Occupational Therapist OT                  OT Recommendation and Plan  Therapy Frequency (OT): other (see comments) (5-7 d/wk)  Plan of Care Review  Plan of Care Reviewed With: patient  Progress: improving  Outcome Evaluation: Pt tolerated tx well this date. Sup>sit-SBA. Pt sat EOB and completed grooming tasks (brush teeth) w/ set up only. Sit>stand-CGA. Fxl mobility- 20' 42', 44', 74' 64' and 88 ft w/  sitting rb's between each distance. Pt then returned to supine position w/ SBA. Pt w/ all needs in reach upon exit. Cont OT POC.     Time Calculation:    Time Calculation- OT       Row Name 06/07/23 1405 06/07/23 1109          Time Calculation- OT    OT Start Time 1405  -KD --     OT Stop Time 1459  -KD --     OT Time Calculation (min) 54 min  -KD --     Total Timed Code Minutes- OT 54 minute(s)  -KD --     OT Received On 06/07/23  -KD --        Timed Charges    03547 - Gait Training Minutes  -- 30  -MOHSEN     47261 - OT Therapeutic Activity Minutes 39  -KD --     23436 - OT Self Care/Mgmt Minutes 15  -KD --        Total Minutes    Timed Charges Total Minutes 54  -KD 30  -MOHSEN      Total Minutes 54  -KD 30  -MOHSEN               User Key  (r) = Recorded By, (t) = Taken By, (c) = Cosigned By      Initials Name Provider Type    MOHSEN Bladimir Romero, PTA Physical Therapist Assistant    Kim Horowitz COTA Occupational Therapist Assistant                  Therapy Charges for Today       Code Description Service Date Service Provider Modifiers Qty    42789762766 HC OT THER PROC EA 15 MIN 6/6/2023 Kim Fernandez COTA GO 2    46948277996 HC OT THERAPEUTIC ACT EA 15 MIN 6/7/2023 Kim Fernandez COTA GO 3    28480830391 HC OT SELF CARE/MGMT/TRAIN EA 15 MIN 6/7/2023 Kim Fernandez COTA GO 1                 ZIGGY Morse  6/7/2023

## 2023-06-07 NOTE — PROGRESS NOTES
TWO PATIENT IDENTIFIERS WERE USED. THE PATIENT WAS DRAPED WITH A FULL BODY DRAPE AND THE PATIENT'S RIGHT ARM WAS PREPPED WITH CHLORA PREP. ULTRASOUND WAS USED TO LOCALIZE THERIGHT BASILIC VEIN. SUBCUTANEOUS TISSUE AT THE CATHETER SITE WAS INFILTRATED WITH 2% LIDOCAINE. UNDER ULTRASOUND GUIDANCE, THE VEIN WAS ACCESSED WITH A 21 GAUGE  NEEDLE. AN 0.018 WIRE WAS THEN THREADED THROUGH THE NEEDLE. THE 21 GAUGE NEEDLE WAS REMOVED AND A 4 Serbian SHEATH WAS PLACED OVER THE WIRE INTO THE VEIN.THE MIDLINE CATHETER WAS TRIMMED TO 20CM. THE MIDLINE CATHETER WAS THEN PLACED OVER THE WIRE INTO THE VEIN, THE SHEATH WAS PEELED AWAY, WIRE WAS REMOVED. CATHETER WAS FLUSHED WITH NORMAL SALINE AND CATHETER TIP APPLIED. BIOPATCH PLACED. CATHETER SECURED WITH STAT LOCK AND TEGADERM. PATIENT TOLERATED PROCEDURE WELL. THIS WAS DONE IN THE ANGIOSUITE      IMPRESSION:SUCCESSFUL PLACEMENT OF DUAL LUMEN MIDLINE.           Lore Ray  6/7/2023  13:07 CDT

## 2023-06-07 NOTE — PLAN OF CARE
Goal Outcome Evaluation:  Plan of Care Reviewed With: patient     Problem: Adult Inpatient Plan of Care  Goal: Plan of Care Review  Outcome: Ongoing, Progressing  Flowsheets (Taken 6/7/2023 1051)  Progress: improving  Plan of Care Reviewed With: patient  Outcome Evaluation: pt responded well to PT with increased gait to 52, 70 ft CGA with bariatric RW and WC to follow for rest breaks. CGA for sit-stand-sit. VSS. tx ended as pt was taking important phone calls. no new goals met at this time. pt would continue to benefit from PT services.

## 2023-06-07 NOTE — PROGRESS NOTES
[facility]  INPATIENT WOUND & OSTOMY CARE    PROGRESS NOTE    Today's Date: 06/07/23    Patient Name: Emre Lisa  MRN: [id number]  CSN: [id number]  PCP: Jamaal Bravo MD  Referring Provider: Javi Alfaro MD  Attending Provider: Bernadine Back MD  Length of Stay: 2    SUBJECTIVE   Chief Complaint: left lower extremity wound/incision site    HPI: Follow up. Unnaboot needs changed. Patient doing well, no complaints      Visit Dx:    ICD-10-CM ICD-9-CM   1. Fever of unknown origin  R50.9 780.60   2. Other specified hypotension  I95.89 458.8   3. Impaired mobility and ADLs [Z74.09, Z78.9 (ICD-10-CM)]  Z74.09 V49.89    Z78.9    4. Impaired functional mobility, balance, gait, and endurance [Z74.09 (ICD-10-CM)]  Z74.09 V49.89       Hospital Problem List:     Fever of unknown origin    Bacteremia      History:   Past Medical History:   Diagnosis Date    Arthritis     Atrial fibrillation     Diabetes mellitus     Diabetic foot ulcer associated with type 2 diabetes mellitus     left great toe    Hallux malleus of left foot     Hammer toe     Hypertension     MI (myocardial infarction)     Neuropathy in diabetes     Onychomycosis     Presence of biventricular cardiac pacemaker     Rheumatoid arthritis      Past Surgical History:   Procedure Laterality Date    AMPUTATION DIGIT Right 03/05/2017    Procedure: RIGHT AMPUTATION TRANSMETATRASAL , RECESSION GASTROCNEMOUS;  Surgeon: Marco A Thompson DPM;  Location: [facility];  Service:     ANKLE FUSION Left 04/13/2023    Procedure: REDUCTION OF LEFT ANKLE DEFORMITY WITH APPLICATION OF EXTERNAL FIXATOR;  Surgeon: Marco A Thompson DPM;  Location: [facility];  Service: Podiatry;  Laterality: Left;    CARDIAC CATHETERIZATION      CARDIAC DEFIBRILLATOR PLACEMENT  2010, 2016    CARPAL TUNNEL RELEASE  2015    elbow and wrist left arm    FOOT FUSION Left 05/15/2023    Procedure: REMOVAL OF EXTERNAL FIXATOR LEFT LOWER EXTREMITY WITH TIBIAL  TALOCALCANEAL ARTHRODESIS  AND ALL INDICATED PROCEDURES;  Surgeon: Marco A Thompson DPM;  Location: [facility] OR;  Service: Podiatry;  Laterality: Left;    FOOT IRRIGATION, DEBRIDEMENT AND REPAIR Left 03/07/2018    Procedure: FOOT IRRIGATION, DEBRIDEMENT AND REPAIR;  Surgeon: Marco A Thompson DPM;  Location: [facility] OR;  Service:     FOOT IRRIGATION, DEBRIDEMENT AND REPAIR Left 03/10/2018    Procedure: FOOT IRRIGATION, DEBRIDEMENT AND REPAIR;  Surgeon: Marco A Thompson DPM;  Location: [facility] OR;  Service: Podiatry    FOOT WOUND CLOSURE Right 03/02/2017    Procedure: INCISION AND DRAINAGE, RIGHT FOOT;  Surgeon: Tung Rosenthal DPM;  Location: [facility] OR;  Service:     HAMMER TOE REPAIR Left 02/15/2018    Procedure: HAMMERTOE CORRECTION LEFT SECOND, THIRD AND FOURTH TOES AND HALLUX INTERPHALANGEAL JOINT ARTHRODESIS LEFT FOOT (Micro Asnis, 4.0 & 5.0 Asnis)      (c-arm);  Surgeon: Marco A Thompson DPM;  Location: [facility] OR;  Service:     HARDWARE REMOVAL Left 03/05/2018    Procedure: ANKLE/FOOT HARDWARE REMOVAL;  Surgeon: Marco A Thompson DPM;  Location: [facility] OR;  Service:     INCISION AND DRAINAGE LEG Left 03/05/2018    Procedure: INCISION AND DRAINAGE LOWER EXTREMITY;  Surgeon: Marco A Thompson DPM;  Location: [facility] OR;  Service:     PLANTAR FASCIA RELEASE Left 11/21/2019    Procedure: PLANTAR PLANING LEFT FOOT AND ALL OTHER INDICATED PROCEDURES;  Surgeon: Marco A Thompson DPM;  Location: [facility] OR;  Service: Podiatry    TOE AMPUTATION Right 2016    TONSILECTOMY, ADENOIDECTOMY, BILATERAL MYRINGOTOMY AND TUBES      age 25     Social History     Socioeconomic History    Marital status:    Tobacco Use    Smoking status: Never    Smokeless tobacco: Never   Vaping Use    Vaping Use: Never used   Substance and Sexual Activity    Alcohol use: Yes     Comment: social, once every three months    Drug use: No    Sexual activity: Defer       Allergies:  Allergies   Allergen Reactions    Heparin Other (See Comments)      Heparin induce thrombocytopenia     Januvia [Sitagliptin] Hives    Lipitor [Atorvastatin] Other (See Comments)     Muscle Cramps...    Shellfish Allergy Swelling and Other (See Comments)     Age 13 this occurred doesn't remember what he ate swelled lips and face    Sulfa Antibiotics Rash     Pt was age of 4 when he was told by his family he had a reaction, pt is unsure        Medications:    Current Facility-Administered Medications:     acetaminophen (TYLENOL) tablet 650 mg, 650 mg, Oral, Q6H PRN, Behroozi, Saeid, MD, 650 mg at 06/06/23 2019    apixaban (ELIQUIS) tablet 5 mg, 5 mg, Oral, Q12H, John Gutierrez DO, 5 mg at 06/06/23 2020    ascorbic acid (VITAMIN C) tablet 1,000 mg, 1,000 mg, Oral, Nightly, John Gutierrez DO, 1,000 mg at 06/06/23 2020    sennosides-docusate (PERICOLACE) 8.6-50 MG per tablet 2 tablet, 2 tablet, Oral, BID, 2 tablet at 06/06/23 2020 **AND** polyethylene glycol (MIRALAX) packet 17 g, 17 g, Oral, Daily PRN **AND** bisacodyl (DULCOLAX) EC tablet 5 mg, 5 mg, Oral, Daily PRN **AND** bisacodyl (DULCOLAX) suppository 10 mg, 10 mg, Rectal, Daily PRN, John Gutierrez DO, 10 mg at 06/04/23 0821    calcium carbonate (TUMS) chewable tablet 500 mg (200 mg elemental), 2 tablet, Oral, BID PRN, John Gutierrez DO    Calcium Replacement - Follow Nurse / BPA Driven Protocol, , Does not apply, PRN, John Gutierrez DO    cholecalciferol (VITAMIN D3) tablet 5,000 Units, 5,000 Units, Oral, Nightly, John Gutierrez DO, 5,000 Units at 06/06/23 2020    dextrose (D50W) (25 g/50 mL) IV injection 25 g, 25 g, Intravenous, Q15 Min PRN, John Gutierrez DO    dextrose (GLUTOSE) oral gel 15 g, 15 g, Oral, Q15 Min PRN, John Gutierrez DO    dronedarone (MULTAQ) tablet 400 mg, 400 mg, Oral, Nightly, John Gutierrez DO, 400 mg at 06/06/23 2020    furosemide (LASIX) injection 40 mg, 40 mg, Intravenous, Daily, John Gutierrez DO, 40 mg at 06/06/23 0816    glucagon (human  recombinant) (GLUCAGEN DIAGNOSTIC) injection 1 mg, 1 mg, Intramuscular, Q15 Min PRN, John Gutierrez R, DO    HYDROcodone-acetaminophen (NORCO) 5-325 MG per tablet 1 tablet, 1 tablet, Oral, Q4H PRN, John Gutierrez R, DO, 1 tablet at 06/06/23 0206    Insulin Aspart (novoLOG) injection 0-14 Units, 0-14 Units, Subcutaneous, TID AC, John Gutierrez R DO, 3 Units at 06/06/23 1717    LORazepam (ATIVAN) tablet 0.5 mg, 0.5 mg, Oral, Q8H PRN, John Gutierrez R DO    losartan (COZAAR) tablet 25 mg, 25 mg, Oral, Daily, John Gutierrez DO, 25 mg at 06/06/23 0903    magnesium oxide (MAG-OX) tablet 400 mg, 400 mg, Oral, BID, John Gutierrez DO, 400 mg at 06/06/23 2020    Magnesium Standard Dose Replacement - Follow Nurse / BPA Driven Protocol, , Does not apply, PRN, John Gutierrez R,     metoprolol tartrate (LOPRESSOR) tablet 25 mg, 25 mg, Oral, Q12H, John Gutierrez DO, 25 mg at 06/06/23 2020    multivitamin (THERAGRAN) tablet 1 tablet, 1 tablet, Oral, Nightly, John Gutierrez R, DO, 1 tablet at 06/06/23 2020    nitroglycerin (NITROSTAT) SL tablet 0.4 mg, 0.4 mg, Sublingual, Q5 Min PRN, John Gutierrez R,     ondansetron (ZOFRAN) tablet 4 mg, 4 mg, Oral, Q6H PRN **OR** ondansetron (ZOFRAN) injection 4 mg, 4 mg, Intravenous, Q6H PRN, John Gutierrez DO, 4 mg at 06/03/23 1927    Phosphorus Replacement - Follow Nurse / BPA Driven Protocol, , Does not apply, PRN, John Gutierrez R, DO    piperacillin-tazobactam (ZOSYN) 3.375 g/100 mL 0.9% NS IVPB (mbp), 3.375 g, Intravenous, Q8H, Behroozi, Saeid, MD, 3.375 g at 06/07/23 0538    Potassium Replacement - Follow Nurse / BPA Driven Protocol, , Does not apply, PRN, John Gutierrez DO    sodium chloride 0.9 % flush 10 mL, 10 mL, Intravenous, Q12H, John Gutierrez DO, 10 mL at 06/06/23 2022    sodium chloride 0.9 % flush 10 mL, 10 mL, Intravenous, PRN, John Gutierrez DO, 10 mL at 06/07/23 0538    sodium chloride 0.9 % infusion 40  mL, 40 mL, Intravenous, PRN, John Gutierrez R, DO    Review of Systems:    Review of Systems      OBJECTIVE     Vitals:    06/07/23 0335   BP: 103/57   Pulse: 74   Resp: 18   Temp: 98.7 °F (37.1 °C)   SpO2: 94%       PHYSICAL EXAM  Pt presents .  Physical Exam  Physical Exam  Vitals reviewed. Nursing notes reviewed.  Constitutional:       Appearance: Well-developed.   Skin:     General: Skin is warm and dry.      Coloration: Skin is not pale.      Findings: No erythema or rash. Multiple left lower extremity wounds  Neurological:      Mental Status: Pt is alert and oriented to person, place, and time.   Psychiatric:         Behavior: Behavior normal.         Thought Content: Thought content normal.         Judgment: Judgment normal.       Results Review:  Lab Results (last 48 hours)       Procedure Component Value Units Date/Time    POC Glucose Once [[id number]]  (Abnormal) Collected: 05/31/23 1114    Specimen: Blood Updated: 05/31/23 1127     Glucose 158 mg/dL      Comment: RN NotifiedOperator: [id number] JOHAN Singh ID: [id number]       POC Glucose Once [[id number]]  (Abnormal) Collected: 05/31/23 0632    Specimen: Blood Updated: 05/31/23 0644     Glucose 163 mg/dL      Comment: : [id number] LEONEL Reynagamicha ID: [id number]             Imaging Results (Last 72 Hours)       Procedure Component Value Units Date/Time    XR Foot 3+ View Left [[id number]] Collected: 06/05/23 1759     Updated: 06/05/23 2017    Narrative:      X-RAY FOOT LEFT, THREE VIEWS    HISTORY:  Post-op.    FINDINGS:  Retrograde nailing of the tibia/ankle fusion with hardware appearing intact.  Otherwise similar-appearing marked changes of Charcot arthropathy.        XR Ankle 3+ View Left [[id number]] Collected: 06/05/23 1759     Updated: 06/05/23 2017    Narrative:      X-RAY ANKLE LEFT, THREE VIEWS    HISTORY:  Post-op.    FINDINGS:  Postoperative changes of retrograde tibial nailing/ankle fusion with distal  fibular resection.   Otherwise similar-appearing marked changes of Charcot  arthropathy.                 ASSESSMENT/PLAN       Examination and evaluation of wound(s) was performed.    DIAGNOSIS:     Edema  Unspecified open wound, left lower extremity  Charcot ankle        PLAN:     Unnaboot applied for edema control.    Discussed findings and treatment plan including risks, benefits, and treatment options with patient in detail. Patient agreed with treatment plan.        This document has been electronically signed by WALTER Duncan on June 7, 2023 08:53 CDT

## 2023-06-07 NOTE — PLAN OF CARE
Goal Outcome Evaluation:  Plan of Care Reviewed With: patient        Progress: improving   Resting well.Reports pain controlled with PRN Tylenol.Assist for repositioning . Remains alert and oriented.

## 2023-06-07 NOTE — THERAPY TREATMENT NOTE
Acute Care - Physical Therapy Treatment Note  [facility]     Patient Name: Emre Lisa  : 1960  MRN: [id number]  Today's Date: 2023      Visit Dx:     ICD-10-CM ICD-9-CM   1. Fever of unknown origin  R50.9 780.60   2. Other specified hypotension  I95.89 458.8   3. Impaired mobility and ADLs [Z74.09, Z78.9 (ICD-10-CM)]  Z74.09 V49.89    Z78.9    4. Impaired functional mobility, balance, gait, and endurance [Z74.09 (ICD-10-CM)]  Z74.09 V49.89     Patient Active Problem List   Diagnosis    Foot osteomyelitis, right    Nodule of groin    Type 2 diabetes mellitus with skin complication    Status post transmetatarsal amputation of right foot    Hammer toe of left foot    Hallux malleus of left foot    Cellulitis of left foot    Infected hardware in left leg    Chronic multifocal osteomyelitis of left foot    Charcot's joint of left foot    Skin ulcer of left foot, limited to breakdown of skin    Dyspnea    Syncope    Acute respiratory failure with hypoxia    Tachycardia    Neuropathy due to secondary diabetes mellitus    Atrial fibrillation    Presence of biventricular cardiac pacemaker    Hypertension    Medically complex patient    Fever of unknown origin    Bacteremia     Past Medical History:   Diagnosis Date    Arthritis     Atrial fibrillation     Diabetes mellitus     Diabetic foot ulcer associated with type 2 diabetes mellitus     left great toe    Hallux malleus of left foot     Hammer toe     Hypertension     MI (myocardial infarction)     Neuropathy in diabetes     Onychomycosis     Presence of biventricular cardiac pacemaker     Rheumatoid arthritis      Past Surgical History:   Procedure Laterality Date    AMPUTATION DIGIT Right 2017    Procedure: RIGHT AMPUTATION TRANSMETATRASAL , RECESSION GASTROCNEMOUS;  Surgeon: Marco A Thompson DPM;  Location: [facility];  Service:     ANKLE FUSION Left 2023    Procedure: REDUCTION OF LEFT ANKLE DEFORMITY WITH APPLICATION OF EXTERNAL  FIXATOR;  Surgeon: Marco A Tohmpson DPM;  Location: [facility] OR;  Service: Podiatry;  Laterality: Left;    CARDIAC CATHETERIZATION      CARDIAC DEFIBRILLATOR PLACEMENT  2010, 2016    CARPAL TUNNEL RELEASE  2015    elbow and wrist left arm    FOOT FUSION Left 05/15/2023    Procedure: REMOVAL OF EXTERNAL FIXATOR LEFT LOWER EXTREMITY WITH TIBIAL TALOCALCANEAL ARTHRODESIS  AND ALL INDICATED PROCEDURES;  Surgeon: Marco A Thompson DPM;  Location: [facility] OR;  Service: Podiatry;  Laterality: Left;    FOOT IRRIGATION, DEBRIDEMENT AND REPAIR Left 03/07/2018    Procedure: FOOT IRRIGATION, DEBRIDEMENT AND REPAIR;  Surgeon: Marco A Thompson DPM;  Location: [facility] OR;  Service:     FOOT IRRIGATION, DEBRIDEMENT AND REPAIR Left 03/10/2018    Procedure: FOOT IRRIGATION, DEBRIDEMENT AND REPAIR;  Surgeon: Marco A Thompson DPM;  Location: [facility] OR;  Service: Podiatry    FOOT WOUND CLOSURE Right 03/02/2017    Procedure: INCISION AND DRAINAGE, RIGHT FOOT;  Surgeon: Tung Rosenthal DPM;  Location: [facility] OR;  Service:     HAMMER TOE REPAIR Left 02/15/2018    Procedure: HAMMERTOE CORRECTION LEFT SECOND, THIRD AND FOURTH TOES AND HALLUX INTERPHALANGEAL JOINT ARTHRODESIS LEFT FOOT (Micro Asnis, 4.0 & 5.0 Asnis)      (c-arm);  Surgeon: Marco A Thompson DPM;  Location: [facility] OR;  Service:     HARDWARE REMOVAL Left 03/05/2018    Procedure: ANKLE/FOOT HARDWARE REMOVAL;  Surgeon: Marco A Thompson DPM;  Location: [facility] OR;  Service:     INCISION AND DRAINAGE LEG Left 03/05/2018    Procedure: INCISION AND DRAINAGE LOWER EXTREMITY;  Surgeon: Marco A Thompson DPM;  Location: [facility] OR;  Service:     PLANTAR FASCIA RELEASE Left 11/21/2019    Procedure: PLANTAR PLANING LEFT FOOT AND ALL OTHER INDICATED PROCEDURES;  Surgeon: Marco A Thompson DPM;  Location: [facility] OR;  Service: Podiatry    TOE AMPUTATION Right 2016    TONSILECTOMY, ADENOIDECTOMY, BILATERAL MYRINGOTOMY AND TUBES      age 25     PT Assessment (last 12 hours)       PT Evaluation and  Treatment       [facility] Name 06/07/23 1014          Physical Therapy Time and Intention    Document Type therapy note (daily note)  -     Mode of Treatment physical therapy;individual therapy  -     Patient Effort good  -[facility] Name 06/07/23 1014          General Information    Patient Profile Reviewed yes  -     Existing Precautions/Restrictions fall;weight bearing;other (see comments)  WBAT.  -[facility] 06/07/23 1014          Cognition    Affect/Mental Status (Cognition) WFL  -     Orientation Status (Cognition) oriented x 4  -     Personal Safety Interventions fall prevention program maintained;gait belt;muscle strengthening facilitated;nonskid shoes/slippers when out of bed;supervised activity  -MOHSEN       Row Name 06/07/23 1014          Mobility    Extremity Weight-bearing Status left lower extremity  -     Left Lower Extremity (Weight-bearing Status) weight-bearing as tolerated (WBAT)  -MOHSEN       Row Name 06/07/23 1014          Bed Mobility    Supine-Sit Swisher (Bed Mobility) --  -     Sit-Supine Swisher (Bed Mobility) --  -     Assistive Device (Bed Mobility) head of bed elevated;bed rails  -MOHSEN       Row Name 06/07/23 1014          Transfers    Transfers sit-stand transfer;stand-sit transfer  -MOHSEN       Row Name 06/07/23 1014          Sit-Stand Transfer    Sit-Stand Swisher (Transfers) contact guard  -     Assistive Device (Sit-Stand Transfers) walker, front-wheeled  -MOHSEN       Row Name 06/07/23 1014          Stand-Sit Transfer    Stand-Sit Swisher (Transfers) contact guard  -     Assistive Device (Stand-Sit Transfers) walker, front-wheeled  -MOHSEN       Row Name 06/07/23 1014          Gait/Stairs (Locomotion)    Gait/Stairs Locomotion gait/ambulation assistive device;distance ambulated;gait pattern;gait deviations;maintains weight-bearing status;stairs negotiation;gait/ambulation independence  -     Swisher Level (Gait) contact guard  -     Assistive Device  (Gait) walker, front-wheeled  -MOHSEN     Distance in Feet (Gait) 54, 70  -MOHSEN     Deviations/Abnormal Patterns (Gait) base of support, wide;marilee decreased  -MOHSEN     Bilateral Gait Deviations forward flexed posture  -MOHSEN       Row Name 06/07/23 1014          Safety Issues, Functional Mobility    Impairments Affecting Function (Mobility) strength;endurance/activity tolerance;sensation/sensory awareness;balance  -MOHSEN       Row Name             Wound 06/03/23 0920 Left lower leg    Wound - Properties Group Placement Date: 06/03/23  -LW Placement Time: 0920  -LW Side: Left  -LW Orientation: lower  -LW Location: leg  -LW    Retired Wound - Properties Group Placement Date: 06/03/23  -LW Placement Time: 0920  -LW Side: Left  -LW Orientation: lower  -LW Location: leg  -LW    Retired Wound - Properties Group Date first assessed: 06/03/23  -LW Time first assessed: 0920  -LW Side: Left  -LW Location: leg  -LW      Row Name             Wound 06/03/23 0922 Left posterior heel    Wound - Properties Group Placement Date: 06/03/23  -LW Placement Time: 0922  -LW Side: Left  -LW Orientation: posterior  -LW Location: heel  -LW    Retired Wound - Properties Group Placement Date: 06/03/23  -LW Placement Time: 0922  -LW Side: Left  -LW Orientation: posterior  -LW Location: heel  -LW    Retired Wound - Properties Group Date first assessed: 06/03/23  -LW Time first assessed: 0922  -LW Side: Left  -LW Location: heel  -LW      Row Name             Wound 06/03/23 0923 Left anterior ankle    Wound - Properties Group Placement Date: 06/03/23  -LW Placement Time: 0923  -LW Side: Left  -LW Orientation: anterior  -LW Location: ankle  -LW    Retired Wound - Properties Group Placement Date: 06/03/23  -LW Placement Time: 0923  -LW Side: Left  -LW Orientation: anterior  -LW Location: ankle  -LW    Retired Wound - Properties Group Date first assessed: 06/03/23  -LW Time first assessed: 0923  -LW Side: Left  -LW Location: ankle  -LW      Row Name              Wound 04/14/23 2157 Left medial gluteal    Wound - Properties Group Placement Date: 04/14/23  -ML Placement Time: 2157  -ML Present on Hospital Admission: Y  -ML, it was passed on in report pt came in with wound, pt stated wound was present on admission  Side: Left  -ML Orientation: medial  -ML Location: gluteal  -ML    Retired Wound - Properties Group Placement Date: 04/14/23  -ML Placement Time: 2157  -ML Present on Hospital Admission: Y  -ML, it was passed on in report pt came in with wound, pt stated wound was present on admission  Side: Left  -ML Orientation: medial  -ML Location: gluteal  -ML    Retired Wound - Properties Group Date first assessed: 04/14/23  -ML Time first assessed: 2157  -ML Present on Hospital Admission: Y  -ML, it was passed on in report pt came in with wound, pt stated wound was present on admission  Side: Left  -ML Location: gluteal  -ML      Row Name             Wound 05/15/23 1546 Left anterior foot Incision    Wound - Properties Group Placement Date: 05/15/23  -HP Placement Time: 1546  -HP Present on Hospital Admission: Y  -HP Side: Left  -HP Orientation: anterior  -HP Location: foot  -HP Primary Wound Type: Incision  -HP Additional Comments: adaptic, bacitracin ointment, 4x4, abd, webril, ace, boot  -HP    Retired Wound - Properties Group Placement Date: 05/15/23  -HP Placement Time: 1546  -HP Present on Hospital Admission: Y  -HP Side: Left  -HP Orientation: anterior  -HP Location: foot  -HP Primary Wound Type: Incision  -HP Additional Comments: adaptic, bacitracin ointment, 4x4, abd, webril, ace, boot  -HP    Retired Wound - Properties Group Date first assessed: 05/15/23  -HP Time first assessed: 1546  -HP Present on Hospital Admission: Y  -HP Side: Left  -HP Location: foot  -HP Primary Wound Type: Incision  -HP Additional Comments: adaptic, bacitracin ointment, 4x4, abd, webril, ace, boot  -HP      Row Name 06/07/23 1014          Vital Signs    Pre Systolic BP Rehab 115  -MOHSEN      Pre Treatment Diastolic BP 67  -MOHSEN     Pretreatment Heart Rate (beats/min) 95  -MOHSEN     Pre SpO2 (%) 97  -MOHSEN     O2 Delivery Pre Treatment room air  -MOHSEN     Intra SpO2 (%) 97  -MOHSEN     O2 Delivery Intra Treatment room air  -MOHSEN     Pre Patient Position Sitting  -MOHSEN     Post Patient Position Sitting  -MOHSEN       Row Name 06/07/23 1014          Positioning and Restraints    Pre-Treatment Position in bed  -MOHSEN     Post Treatment Position chair  -MOHSEN     In Chair sitting;encouraged to call for assist  on the phone. pt dod not want to t/f back to bed.  -MOHSEN       Row Name 06/07/23 1014          Therapy Assessment/Plan (PT)    Rehab Potential (PT) good, to achieve stated therapy goals  -     Criteria for Skilled Interventions Met (PT) yes;skilled treatment is necessary  -MOHSEN     Therapy Frequency (PT) other (see comments)  5-7 days/week  -MOHSEN       Row Name 06/07/23 1014          Bed Mobility Goal 1 (PT)    Activity/Assistive Device (Bed Mobility Goal 1, PT) sit to supine/supine to sit  -MOHSEN     [place] Level/Cues Needed (Bed Mobility Goal 1, PT) independent  -MOHSEN     Time Frame (Bed Mobility Goal 1, PT) by discharge  -MOHSEN     Strategies/Barriers (Bed Mobility Goal 1, PT) HOB flat, no bed rails.  -MOHSEN     Progress/Outcomes (Bed Mobility Goal 1, PT) goal not met  -MOHSEN       Row Name 06/07/23 1014          Transfer Goal 1 (PT)    Activity/Assistive Device (Transfer Goal 1, PT) sit-to-stand/stand-to-sit;bed-to-chair/chair-to-bed;walker, rolling  -MOHSEN     [place] Level/Cues Needed (Transfer Goal 1, PT) modified independence  -MOHSEN     Time Frame (Transfer Goal 1, PT) by discharge  -MOHSEN     Strategies/Barriers (Transfers Goal 1, PT) R orthopedic shoe, L camboot.  -MOHSEN     Progress/Outcome (Transfer Goal 1, PT) goal not met  -MOHSEN       Row Name 06/07/23 1014          Gait Training Goal 1 (PT)    Activity/Assistive Device (Gait Training Goal 1, PT) walker, rolling  -MOHSEN     Distance (Gait Training Goal 1, PT) 25'x2.  -MOHSEN     Time Frame  (Gait Training Goal 1, PT) by discharge  -     Strategies/Barriers (Gait Training Goal 1, PT) R orthopedic shoe, L camboot.  -     Progress/Outcome (Gait Training Goal 1, PT) goal not met  -               User Key  (r) = Recorded By, (t) = Taken By, (c) = Cosigned By      Initials Name Provider Type     Jennifer Leung, RN Registered Nurse    Bladimir Michelle, PTA Physical Therapist Assistant    Maryann De La Cruz, RN Registered Nurse    Charley Chowdary RN Registered Nurse                    Physical Therapy Education       Title: PT OT SLP Therapies (In Progress)       Topic: Physical Therapy (Not Started)       Point: Mobility training (In Progress)       Learning Progress Summary             Patient Acceptance, E, NR by MOHSEN at 6/7/2023 1057    Acceptance, E, VU by  at 6/5/2023 1309    Comment: PT POC, hand placement with transfers, bariatric FWW, rehab process.                         Point: Home exercise program (Not Started)       Learner Progress:  Not documented in this visit.              Point: Body mechanics (Not Started)       Learner Progress:  Not documented in this visit.                              User Key       Initials Effective Dates Name Provider Type Discipline     06/16/21 -  Bladimir Romero PTA Physical Therapist Assistant PT    CZ 09/18/22 -  Lei Lopez, PT Physical Therapist PT                  PT Recommendation and Plan  Anticipated Discharge Disposition (PT): inpatient rehabilitation facility  Therapy Frequency (PT): other (see comments) (5-7 days/week)  Plan of Care Reviewed With: patient  Progress: improving  Outcome Evaluation: pt responded well to PT with increased gait to 52, 70 ft CGA with bariatric RW and WC to follow for rest breaks. CGA for sit-stand-sit. VSS. tx ended as pt was taking important phone calls. no new goals met at this time. pt would continue to benefit from PT services.   Outcome Measures       Row Name 06/07/23 1014             How  much help from another person do you currently need...    Turning from your back to your side while in flat bed without using bedrails? 3  -MOHSEN      Moving from lying on back to sitting on the side of a flat bed without bedrails? 3  -MOHSEN      Moving to and from a bed to a chair (including a wheelchair)? 3  -MOHSEN      Standing up from a chair using your arms (e.g., wheelchair, bedside chair)? 3  -MOHSEN      Climbing 3-5 steps with a railing? 2  -MOHSEN      To walk in hospital room? 3  -MOHSEN      AM-PAC 6 Clicks Score (PT) 17  -MOHSEN         Functional Assessment    Outcome Measure Options AM-PAC 6 Clicks Basic Mobility (PT)  -MOHSEN                User Key  (r) = Recorded By, (t) = Taken By, (c) = Cosigned By      Initials Name Provider Type    Bladimir Michelle PTA Physical Therapist Assistant                     Time Calculation:    PT Charges       Row Name 06/07/23 1109             Time Calculation    Start Time 1014  -MOHSEN      Stop Time 1109  -MOHSEN      Time Calculation (min) 55 min  -MOHSEN      PT Non-Billable Time (min) 9 min  -MOHSEN         Time Calculation- PT    Total Timed Code Minutes- PT 46 minute(s)  -MOHSEN         Timed Charges    09938 - Gait Training Minutes  30  -MOHSEN      09700 - PT Therapeutic Activity Minutes 16  -MOHSEN         Total Minutes    Timed Charges Total Minutes 46  -MOHSEN       Total Minutes 46  -MOHSEN                User Key  (r) = Recorded By, (t) = Taken By, (c) = Cosigned By      Initials Name Provider Type    Bladimir Michelle PTA Physical Therapist Assistant                  Therapy Charges for Today       Code Description Service Date Service Provider Modifiers Qty    97205420224 HC GAIT TRAINING EA 15 MIN 6/7/2023 Bladimir Romero PTA GP 2    25169673458 HC PT THERAPEUTIC ACT EA 15 MIN 6/7/2023 Bladimir Romero PTA GP 1            PT G-Codes  Outcome Measure Options: AM-PAC 6 Clicks Basic Mobility (PT)  AM-PAC 6 Clicks Score (PT): 17  AM-PAC 6 Clicks Score (OT): 17    Bladimir Romero PTA  6/7/2023

## 2023-06-07 NOTE — PROGRESS NOTES
[facility]  INPATIENT PROGRESS NOTE    Length of Stay: 2  Date of Admission: 6/3/2023  Primary Care Physician: Jamaal Bravo MD    Subjective   (S) Admitted for fever, low grade, from ARU  Today,  no complains; waiting  on placement  Asking for a knee brace    Review of Systems   All other systems reviewed and are negative.     All pertinent negatives and positives are as above. All other systems have been reviewed and are negative unless otherwise stated.     Prior to Admission medications    Medication Sig Start Date End Date Taking? Authorizing Provider   ascorbic acid (VITAMIN C) 1000 MG tablet Take 1 tablet by mouth Every Night. 7/19/21   Herve Cr MD   Bacillus Coagulans-Inulin (Probiotic) 1-250 BILLION-MG capsule Take 1 capsule by mouth 2 (Two) Times a Day.  Patient not taking: Reported on 5/30/2023 1/27/23   Lito Rico APRN   Blood Glucose Monitoring Suppl (ONE TOUCH ULTRA 2) w/Device kit  10/18/21   Herve Cr MD   Cholecalciferol 125 MCG (5000 UT) tablet Take 1 tablet by mouth Every Night. 7/19/21   Herve Cr MD   clindamycin (CLEOCIN) 300 MG capsule Take 1 capsule by mouth 3 (Three) Times a Day.  Patient not taking: Reported on 5/30/2023 4/5/23   Lito Rico APRN   dronedarone (MULTAQ) 400 MG tablet Take 1 tablet by mouth Every Night.    Herve Cr MD   fexofenadine (ALLEGRA) 180 MG tablet Take 1 tablet by mouth Every Night. 7/19/21   Herve Cr MD   fluticasone (FLONASE) 50 MCG/ACT nasal spray 2 sprays into the nostril(s) as directed by provider As Needed for Rhinitis.    Herve Cr MD   furosemide (Lasix) 20 MG tablet Take 1 tablet by mouth Daily.  Patient not taking: Reported on 5/30/2023 4/11/23   Lito Rico APRN   glimepiride (AMARYL) 4 MG tablet Take 1 tablet by mouth Every Night. 7/19/21   Herve Cr MD   losartan (COZAAR) 25 MG  tablet Take 1 tablet by mouth Daily. 7/19/21   Herve Cr MD   magnesium oxide (MAG-OX) 400 MG tablet Take 1 tablet by mouth 2 (Two) Times a Day. 11/25/21   Herve Cr MD   metFORMIN (GLUCOPHAGE) 1000 MG tablet Take 1 tablet by mouth 2 (Two) Times a Day With Meals. 7/19/21   Herve Cr MD   metoprolol succinate XL (TOPROL-XL) 25 MG 24 hr tablet Take 1 tablet by mouth 2 (Two) Times a Day. 0.5 tablet    Herve Cr MD   multivitamin (THERAGRAN) tablet tablet Take  by mouth Every Night.    Herve Cr MD   ONE TOUCH ULTRA TEST test strip USE TO TEST BLOOD SUGAR THREE TIMES A DAY 8/20/16   Herve Cr MD   Zinc 50 MG tablet Take 1 tablet by mouth Every Night.    Herve Cr MD       apixaban, 5 mg, Oral, Q12H  ascorbic acid, 1,000 mg, Oral, Nightly  vitamin D3, 5,000 Units, Oral, Nightly  dronedarone, 400 mg, Oral, Nightly  furosemide, 40 mg, Intravenous, Daily  Insulin Aspart, 0-14 Units, Subcutaneous, TID AC  losartan, 25 mg, Oral, Daily  magnesium oxide, 400 mg, Oral, BID  metoprolol tartrate, 25 mg, Oral, Q12H  multivitamin, 1 tablet, Oral, Nightly  piperacillin-tazobactam, 3.375 g, Intravenous, Q8H  senna-docusate sodium, 2 tablet, Oral, BID  sodium chloride, 10 mL, Intravenous, Q12H           Objective    Temp:  [98 °F (36.7 °C)-98.7 °F (37.1 °C)] 98.2 °F (36.8 °C)  Heart Rate:  [] 103  Resp:  [18] 18  BP: (103-115)/(55-67) 110/55    Physical Exam  Constitutional:       Appearance: He is obese.      Comments: As today   HENT:      Head: Normocephalic.      Nose: Nose normal.      Mouth/Throat:      Mouth: Mucous membranes are moist.   Eyes:      Extraocular Movements: Extraocular movements intact.   Cardiovascular:      Rate and Rhythm: Normal rate and regular rhythm.      Heart sounds: Normal heart sounds.   Pulmonary:      Effort: Pulmonary effort is normal.      Breath sounds: Normal breath sounds.   Abdominal:      General: Bowel  sounds are normal.      Palpations: Abdomen is soft.   Musculoskeletal:      Cervical back: Normal range of motion and neck supple.      Comments: Left lower extremity with an Ulnar boot   Right foot with old TMA scar   Neurological:      General: No focal deficit present.      Mental Status: He is alert. Mental status is at baseline.   Psychiatric:         Behavior: Behavior normal.           Results Review:  I have reviewed the labs, radiology results, and diagnostic studies.    Laboratory Data:   Results from last 7 days   Lab Units 06/07/23  0710 06/06/23  0619 06/05/23  0518   SODIUM mmol/L 137 138 137   POTASSIUM mmol/L 4.0 4.1 3.9   CHLORIDE mmol/L 98 98 99   CO2 mmol/L 28.0 27.0 25.0   BUN mg/dL 32* 32* 23   CREATININE mg/dL 1.28* 1.22 1.12   GLUCOSE mg/dL 140* 141* 115*   CALCIUM mg/dL 9.1 9.1 8.5*   BILIRUBIN mg/dL 0.7 0.7 0.7   ALK PHOS U/L 104 104 98   ALT (SGPT) U/L 30 25 25   AST (SGOT) U/L 31 27 34   ANION GAP mmol/L 11.0 13.0 13.0       Estimated Creatinine Clearance: 94.3 mL/min (A) (by C-G formula based on SCr of 1.28 mg/dL (H)).  Results from last 7 days   Lab Units 06/07/23  0710 06/06/23  0619 06/05/23  0518   MAGNESIUM mg/dL 2.5* 2.5* 2.4           Results from last 7 days   Lab Units 06/07/23  0710 06/06/23  0619 06/05/23  0518 06/04/23  0613 06/03/23  0519   WBC 10*3/mm3 5.11 4.33 3.21* 2.72* 4.81   HEMOGLOBIN g/dL 11.2* 11.7* 11.3* 9.9* 10.1*   HEMATOCRIT % 35.1* 37.1* 36.3* 30.5* 32.2*   PLATELETS 10*3/mm3 193 176 158 126* 131*             Culture Data:   No results found for: BLOODCX    No results found for: URINECX  No results found for: RESPCX  No results found for: WOUNDCX  No results found for: STOOLCX  No components found for: BODYFLD    Radiology Data:   Imaging Results (Last 24 Hours)       Procedure Component Value Units Date/Time    US Guided Vascular Access [[id number]] Collected: 06/07/23 1318     Updated: 06/07/23 1318    Narrative:      Targeted grayscale ultrasound with  compression of the right basilic vein  demonstrates patency.    IR Insert Midline Without Port Pump 5 Plus [[id number]] Resulted: 06/07/23 1308     Updated: 06/07/23 1308    Narrative:      This procedure was auto-finalized with no dictation required.            I have reviewed the patient's current medications.     Assessment/Plan     Fever     Came from ARU     Blood cultures positive and follow up so far negative     Bacteremia by Enterobacter Cloacae     Has started on Zosyn 4/14; by podiatrist antibiotics for 2 weeks since surgical wound is clean;     Sepsis (POA)     Due to above, by the same bacteria     Continue with current antibiotics     Resolving     Constipation     On bowel protocol    Chronic medical problems     Essential hypertension     Type II DM     Hx of cardiac arrhythmias        Medical Decision Making  Number and Complexity of problems:one major problem  Differential Diagnosis: Sepsis    Conditions and Status:        Condition is improving.     [facility] Data  External documents reviewed: previous medical records  My EKG interpretation: n/a  My plain film interpretation: no acute event     Discussed with: patient     Treatment Plan  See above    Care Planning  Shared decision making: his wife  Code status and discussions: full code    Disposition  Social Determinants of Health that impact treatment or disposition:none        I confirmed that the patient's Advance Care Plan is present, code status is documented, or surrogate decision maker is listed in the patient's medical record.     Bernadine Back MD     never used

## 2023-06-07 NOTE — PLAN OF CARE
Goal Outcome Evaluation:  Plan of Care Reviewed With: patient        Progress: improving  Outcome Evaluation: Pt tolerated tx well this date. Sup>sit-SBA. Pt sat EOB and completed grooming tasks (brush teeth) w/ set up only. Sit>stand-CGA. Fxl mobility- 20' 42', 44', 74' 64' and 88 ft w/ sitting rb's between each distance. Pt then returned to supine position w/ SBA. Pt w/ all needs in reach upon exit. Cont OT POC.

## 2023-06-07 NOTE — DISCHARGE PLACEMENT REQUEST
"Amee Lisa (63 y.o. Male)       Date of Birth   1960    Social Security Number       Address   [address] [place] [address] [place] [address]    Home Phone   [phone]    MRN   [id number]       Mandaen   Mormon    Marital Status                               Admission Date   6/3/23    Admission Type   Emergency    Admitting Provider   Andrew Brooks MD    Attending Provider   Bernadine Back MD    Department, Room/Bed   [address], 431/1       Discharge Date       Discharge Disposition       Discharge Destination                                 Attending Provider: Bernadine Back MD    Allergies: Heparin, Januvia [Sitagliptin], Lipitor [Atorvastatin], Shellfish Allergy, Sulfa Antibiotics    Isolation: None   Infection: None   Code Status: CPR    Ht: 193 cm (75.98\")   Wt: 144 kg (318 lb 6.4 oz)    Admission Cmt: None   Principal Problem: Fever of unknown origin [R50.9]                   Active Insurance as of 6/3/2023       Primary Coverage       Payor Plan Insurance Group Employer/Plan Group    MEDICARE MEDICARE A & B        Payor Plan Address Payor Plan Phone Number Payor Plan Fax Number Effective Dates    [address] [phone]  12/1/2012 - None Entered    [facility] [address]         Subscriber Name Subscriber Birth Date Member ID       AMEE LISA 1960 [id number]               Secondary Coverage       Payor Plan Insurance Group Employer/Plan Group    CONTINUECARE - ([place] ONLY) CONTINUECARE [id number]       Payor Plan Address Payor Plan Phone Number Payor Plan Fax Number Effective Dates    [address] [address]   4/21/2023 - None Entered    [facility] [phone]         Subscriber Name Subscriber Birth Date Member ID       AMEE LISA 1960 [id number]               Tertiary Coverage       Payor Plan Insurance Group Employer/Plan Group    AETNA COMMERCIAL AETNA [id number]       Payor Plan Address Payor Plan " Phone Number Payor Plan Fax Number Effective Dates    [address] [address]   1/1/2018 - None Entered    [facility] [address]         Subscriber Name Subscriber Birth Date Member ID       AMEE LISA 1960 [id number]               Other Coverage       Payor Plan Insurance Group Employer/Plan Group    [employer] DOL [id number]       Payor Plan Address Payor Plan Phone Number Payor Plan Fax Number Effective Dates    [facility] [phone]  10/16/2021 - None Entered    [facility] [address]         Subscriber Name Subscriber Birth Date Member ID       AMEE LISA 1960 [id number]                     Emergency Contacts        (Rel.) Home Phone Work Phone Mobile Phone    Reymundo Lisa (Spouse) [phone] -- [phone]              Insurance Information                  MEDICARE/MEDICARE A & B Phone: [phone]    Subscriber: Amee Lisa Subscriber#: [id number]    Group#: -- Precert#: --        CONTINUECARE - ([place] ONLY)/CONTINUECARE Phone: --    Subscriber: Amee Lisa Subscriber#: [id number]    Group#: [id number] Precert#: --        AETNA COMMERCIAL/AETNA Phone: --    Subscriber: Amee Lisa Subscriber#: [id number]    Group#: [id number] Precert#: --

## 2023-06-08 VITALS
BODY MASS INDEX: 38.36 KG/M2 | HEART RATE: 94 BPM | HEIGHT: 76 IN | WEIGHT: 315 LBS | RESPIRATION RATE: 18 BRPM | SYSTOLIC BLOOD PRESSURE: 112 MMHG | OXYGEN SATURATION: 97 % | DIASTOLIC BLOOD PRESSURE: 66 MMHG | TEMPERATURE: 98.1 F

## 2023-06-08 LAB
ALBUMIN SERPL-MCNC: 4.1 G/DL (ref 3.5–5.2)
ALBUMIN/GLOB SERPL: 1.2 G/DL
ALP SERPL-CCNC: 113 U/L (ref 39–117)
ALT SERPL W P-5'-P-CCNC: 29 U/L (ref 1–41)
ANION GAP SERPL CALCULATED.3IONS-SCNC: 14 MMOL/L (ref 5–15)
AST SERPL-CCNC: 27 U/L (ref 1–40)
BACTERIA SPEC AEROBE CULT: NORMAL
BASOPHILS # BLD AUTO: 0.03 10*3/MM3 (ref 0–0.2)
BASOPHILS NFR BLD AUTO: 0.6 % (ref 0–1.5)
BILIRUB SERPL-MCNC: 0.8 MG/DL (ref 0–1.2)
BUN SERPL-MCNC: 29 MG/DL (ref 8–23)
BUN/CREAT SERPL: 25.4 (ref 7–25)
CALCIUM SPEC-SCNC: 9.1 MG/DL (ref 8.6–10.5)
CHLORIDE SERPL-SCNC: 96 MMOL/L (ref 98–107)
CO2 SERPL-SCNC: 25 MMOL/L (ref 22–29)
CREAT SERPL-MCNC: 1.14 MG/DL (ref 0.76–1.27)
DEPRECATED RDW RBC AUTO: 46.4 FL (ref 37–54)
EGFRCR SERPLBLD CKD-EPI 2021: 73.2 ML/MIN/1.73
EOSINOPHIL # BLD AUTO: 0.14 10*3/MM3 (ref 0–0.4)
EOSINOPHIL NFR BLD AUTO: 2.8 % (ref 0.3–6.2)
ERYTHROCYTE [DISTWIDTH] IN BLOOD BY AUTOMATED COUNT: 14.7 % (ref 12.3–15.4)
GLOBULIN UR ELPH-MCNC: 3.4 GM/DL
GLUCOSE BLDC GLUCOMTR-MCNC: 158 MG/DL (ref 70–130)
GLUCOSE BLDC GLUCOMTR-MCNC: 200 MG/DL (ref 70–130)
GLUCOSE SERPL-MCNC: 148 MG/DL (ref 65–99)
HCT VFR BLD AUTO: 35.9 % (ref 37.5–51)
HGB BLD-MCNC: 11.4 G/DL (ref 13–17.7)
IMM GRANULOCYTES # BLD AUTO: 0.09 10*3/MM3 (ref 0–0.05)
IMM GRANULOCYTES NFR BLD AUTO: 1.8 % (ref 0–0.5)
LYMPHOCYTES # BLD AUTO: 1.2 10*3/MM3 (ref 0.7–3.1)
LYMPHOCYTES NFR BLD AUTO: 23.7 % (ref 19.6–45.3)
MAGNESIUM SERPL-MCNC: 2.3 MG/DL (ref 1.6–2.4)
MCH RBC QN AUTO: 27.3 PG (ref 26.6–33)
MCHC RBC AUTO-ENTMCNC: 31.8 G/DL (ref 31.5–35.7)
MCV RBC AUTO: 85.9 FL (ref 79–97)
MONOCYTES # BLD AUTO: 0.51 10*3/MM3 (ref 0.1–0.9)
MONOCYTES NFR BLD AUTO: 10.1 % (ref 5–12)
NEUTROPHILS NFR BLD AUTO: 3.09 10*3/MM3 (ref 1.7–7)
NEUTROPHILS NFR BLD AUTO: 61 % (ref 42.7–76)
NRBC BLD AUTO-RTO: 0 /100 WBC (ref 0–0.2)
PLATELET # BLD AUTO: 224 10*3/MM3 (ref 140–450)
PMV BLD AUTO: 10.6 FL (ref 6–12)
POTASSIUM SERPL-SCNC: 3.9 MMOL/L (ref 3.5–5.2)
PROT SERPL-MCNC: 7.5 G/DL (ref 6–8.5)
RBC # BLD AUTO: 4.18 10*6/MM3 (ref 4.14–5.8)
SODIUM SERPL-SCNC: 135 MMOL/L (ref 136–145)
WBC NRBC COR # BLD: 5.06 10*3/MM3 (ref 3.4–10.8)

## 2023-06-08 PROCEDURE — 80053 COMPREHEN METABOLIC PANEL: CPT | Performed by: HOSPITALIST

## 2023-06-08 PROCEDURE — 25010000002 PIPERACILLIN SOD-TAZOBACTAM PER 1 G: Performed by: INTERNAL MEDICINE

## 2023-06-08 PROCEDURE — 63710000001 INSULIN ASPART PER 5 UNITS: Performed by: HOSPITALIST

## 2023-06-08 PROCEDURE — 85025 COMPLETE CBC W/AUTO DIFF WBC: CPT | Performed by: HOSPITALIST

## 2023-06-08 PROCEDURE — 83735 ASSAY OF MAGNESIUM: CPT | Performed by: HOSPITALIST

## 2023-06-08 PROCEDURE — 82948 REAGENT STRIP/BLOOD GLUCOSE: CPT

## 2023-06-08 PROCEDURE — 25010000002 FUROSEMIDE PER 20 MG: Performed by: HOSPITALIST

## 2023-06-08 RX ORDER — ONDANSETRON 4 MG/1
4 TABLET, FILM COATED ORAL EVERY 6 HOURS PRN
Qty: 30 TABLET | Refills: 0 | Status: SHIPPED | OUTPATIENT
Start: 2023-06-08

## 2023-06-08 RX ORDER — ACETAMINOPHEN 325 MG/1
650 TABLET ORAL EVERY 6 HOURS PRN
Qty: 60 TABLET | Refills: 0 | Status: SHIPPED | OUTPATIENT
Start: 2023-06-08

## 2023-06-08 RX ADMIN — DOCUSATE SODIUM 50 MG AND SENNOSIDES 8.6 MG 2 TABLET: 8.6; 5 TABLET, FILM COATED ORAL at 09:10

## 2023-06-08 RX ADMIN — APIXABAN 5 MG: 5 TABLET, FILM COATED ORAL at 09:11

## 2023-06-08 RX ADMIN — FUROSEMIDE 40 MG: 10 INJECTION, SOLUTION INTRAVENOUS at 09:11

## 2023-06-08 RX ADMIN — Medication 10 ML: at 10:40

## 2023-06-08 RX ADMIN — PIPERACILLIN SODIUM AND TAZOBACTAM SODIUM 3.38 G: 3; .375 INJECTION, POWDER, LYOPHILIZED, FOR SOLUTION INTRAVENOUS at 05:56

## 2023-06-08 RX ADMIN — INSULIN ASPART 3 UNITS: 100 INJECTION, SOLUTION INTRAVENOUS; SUBCUTANEOUS at 09:12

## 2023-06-08 RX ADMIN — FLUTICASONE PROPIONATE 2 SPRAY: 50 SPRAY, METERED NASAL at 09:11

## 2023-06-08 RX ADMIN — METOPROLOL TARTRATE 25 MG: 25 TABLET, FILM COATED ORAL at 09:11

## 2023-06-08 RX ADMIN — Medication 400 MG: at 09:10

## 2023-06-08 NOTE — PLAN OF CARE
Goal Outcome Evaluation:  Plan of Care Reviewed With: patient        Progress: improving  Outcome Evaluation: VSS. Room air. Left foot elevated on wedge pillow in bed. IV antibiotics infused per order. No BM this shift. Urinal at bedside.

## 2023-06-08 NOTE — DISCHARGE SUMMARY
[facility] Medicine Services    DISCHARGE SUMMARY       Date of Admission: 6/3/2023  Date of Discharge:  6/8/2023  Primary Care Physician: Jamaal Bravo MD    Presenting Problem/History of Present Illness:  Other specified hypotension [I95.89]  Fever of unknown origin [R50.9]  Bacteremia [R78.81]       Final Discharge Diagnoses:  Active Hospital Problems    Diagnosis     **Fever of unknown origin     Bacteremia        Consults:   Consults       Date and Time Order Name Status Description    4/28/2023  6:33 AM Hematology & Oncology Inpatient Consult Completed             Procedures Performed:                 Pertinent Test Results:   Imaging Results (All)       Procedure Component Value Units Date/Time    US Guided Vascular Access [[id number]] Collected: 06/07/23 1318     Updated: 06/07/23 1741    Narrative:      Targeted grayscale ultrasound with compression of the right basilic vein  demonstrates patency.    IR Insert Midline Without Port Pump 5 Plus [[id number]] Resulted: 06/07/23 1308     Updated: 06/07/23 1308    Narrative:      This procedure was auto-finalized with no dictation required.    XR Foot 3+ View Left [[id number]] Collected: 06/05/23 1759     Updated: 06/05/23 2017    Narrative:      X-RAY FOOT LEFT, THREE VIEWS    HISTORY:  Post-op.    FINDINGS:  Retrograde nailing of the tibia/ankle fusion with hardware appearing intact.  Otherwise similar-appearing marked changes of Charcot arthropathy.        XR Ankle 3+ View Left [[id number]] Collected: 06/05/23 1759     Updated: 06/05/23 2017    Narrative:      X-RAY ANKLE LEFT, THREE VIEWS    HISTORY:  Post-op.    FINDINGS:  Postoperative changes of retrograde tibial nailing/ankle fusion with distal  fibular resection.  Otherwise similar-appearing marked changes of Charcot  arthropathy.      XR Chest 1 View [[id number]] Collected: 06/03/23 0517     Updated: 06/03/23 0650    Narrative:       INDICATION:  hypotension    COMPARISON:  4/25/2023      Impression:      FINDINGS/IMPRESSION:  No consolidative pulmonary opacity, pleural effusion, or pneumothorax.  The heart is enlarged.  Left chest ICD projects in stable position.      CT Angiogram Chest [[id number]] Collected: 06/03/23 0613     Updated: 06/03/23 0650    Narrative:      INDICATION:  hypotension    COMPARISON:  4/26/2023    TECHNIQUE:  Volumetric CT scan of the chest, from the thoracic inlet to the level of the  diaphragms, with intravenous contrast. 2D axial, sagittal, and coronal reformats  were performed. MIPS were performed on a separate workstation and reviewed.    FINDINGS:  Lungs: No focal consolidation or mass.  Pleura: No effusion or pneumothorax.  Heart: Mildly enlarged.  No evidence of acute right heart strain.  Vessels: Thoracic aorta is normal in caliber.  Bilateral pulmonary emboli are  again present, involving lobar and segmental branches, overall burden decreased  from prior examination.  Bones: No suspicious lesions.  Visualized upper abdomen: Unremarkable.      Impression:      Bilateral pulmonary emboli are again present, overall burden decreased from  prior examination.             Lab Results (last 48 hours)       Procedure Component Value Units Date/Time    POC Glucose Once [[id number]]  (Abnormal) Collected: 06/08/23 1049    Specimen: Blood Updated: 06/08/23 1109     Glucose 200 mg/dL      Comment: RN NotifiedOperator: [id number] SALVATORE NOBLESANNAMeter ID: [id number]       Blood Culture - Blood, Arm, Left [[id number]]  (Normal) Collected: 06/03/23 0839    Specimen: Blood from Arm, Left Updated: 06/08/23 0900     Blood Culture No growth at 5 days    POC Glucose Once [[id number]]  (Abnormal) Collected: 06/08/23 0710    Specimen: Blood Updated: 06/08/23 0728     Glucose 158 mg/dL      Comment: RN NotifiedOperator: [id number] SALVATORE NOBLESANNAMeter ID: [id number]       Magnesium [[id number]]  (Normal) Collected: 06/08/23 0632     Specimen: Blood Updated: 06/08/23 0711     Magnesium 2.3 mg/dL     Comprehensive Metabolic Panel [[id number]]  (Abnormal) Collected: 06/08/23 0632    Specimen: Blood Updated: 06/08/23 0711     Glucose 148 mg/dL      BUN 29 mg/dL      Creatinine 1.14 mg/dL      Sodium 135 mmol/L      Potassium 3.9 mmol/L      Chloride 96 mmol/L      CO2 25.0 mmol/L      Calcium 9.1 mg/dL      Total Protein 7.5 g/dL      Albumin 4.1 g/dL      ALT (SGPT) 29 U/L      AST (SGOT) 27 U/L      Alkaline Phosphatase 113 U/L      Total Bilirubin 0.8 mg/dL      Globulin 3.4 gm/dL      A/G Ratio 1.2 g/dL      BUN/Creatinine Ratio 25.4     Anion Gap 14.0 mmol/L      eGFR 73.2 mL/min/1.73     Narrative:      GFR Normal >60  Chronic Kidney Disease <60  Kidney Failure <15      CBC Auto Differential [[id number]]  (Abnormal) Collected: 06/08/23 0632    Specimen: Blood Updated: 06/08/23 0649     WBC 5.06 10*3/mm3      RBC 4.18 10*6/mm3      Hemoglobin 11.4 g/dL      Hematocrit 35.9 %      MCV 85.9 fL      MCH 27.3 pg      MCHC 31.8 g/dL      RDW 14.7 %      RDW-SD 46.4 fl      MPV 10.6 fL      Platelets 224 10*3/mm3      Neutrophil % 61.0 %      Lymphocyte % 23.7 %      Monocyte % 10.1 %      Eosinophil % 2.8 %      Basophil % 0.6 %      Immature Grans % 1.8 %      Neutrophils, Absolute 3.09 10*3/mm3      Lymphocytes, Absolute 1.20 10*3/mm3      Monocytes, Absolute 0.51 10*3/mm3      Eosinophils, Absolute 0.14 10*3/mm3      Basophils, Absolute 0.03 10*3/mm3      Immature Grans, Absolute 0.09 10*3/mm3      nRBC 0.0 /100 WBC     POC Glucose Once [[id number]]  (Abnormal) Collected: 06/07/23 1949    Specimen: Blood Updated: 06/07/23 2016     Glucose 257 mg/dL      Comment: RN NotifiedOperator: [id number] MARICRUZ JENNIFERMeter ID: [id number]       COVID-19,CEPHEID/MANNIE,COR/ALFA/PAD/JANA/MAD IN-HOUSE(OR EMERGENT/ADD-ON),NP SWAB IN TRANSPORT MEDIA 3-4 HR TAT, RT-PCR - Swab, Nasopharynx [[id number]]  (Normal) Collected: 06/07/23 1739    Specimen: Swab from  Nasopharynx Updated: 06/07/23 1821     COVID19 Not Detected    Narrative:      Fact sheet for providers: https://www.fda.gov/media/935628/download     Fact sheet for patients: https://www.fda.gov/media/156830/download  Fact sheet for providers: https://www.fda.gov/media/817493/download    Fact sheet for patients: https://www.fda.gov/media/913327/download    Test performed by PCR.    POC Glucose Once [[id number]]  (Abnormal) Collected: 06/07/23 1621    Specimen: Blood Updated: 06/07/23 1641     Glucose 186 mg/dL      Comment: RN NotifiedOperator: [id number] NAZARIO ALISEMeter ID: [id number]       POC Glucose Once [[id number]]  (Abnormal) Collected: 06/07/23 1105    Specimen: Blood Updated: 06/07/23 1123     Glucose 221 mg/dL      Comment: RN NotifiedOperator: [id number] NAZARIO ALISEMeter ID: [id number]       Magnesium [[id number]]  (Abnormal) Collected: 06/07/23 0710    Specimen: Blood Updated: 06/07/23 0743     Magnesium 2.5 mg/dL     Comprehensive Metabolic Panel [[id number]]  (Abnormal) Collected: 06/07/23 0710    Specimen: Blood Updated: 06/07/23 0743     Glucose 140 mg/dL      BUN 32 mg/dL      Creatinine 1.28 mg/dL      Sodium 137 mmol/L      Potassium 4.0 mmol/L      Chloride 98 mmol/L      CO2 28.0 mmol/L      Calcium 9.1 mg/dL      Total Protein 7.2 g/dL      Albumin 3.8 g/dL      ALT (SGPT) 30 U/L      AST (SGOT) 31 U/L      Alkaline Phosphatase 104 U/L      Total Bilirubin 0.7 mg/dL      Globulin 3.4 gm/dL      A/G Ratio 1.1 g/dL      BUN/Creatinine Ratio 25.0     Anion Gap 11.0 mmol/L      eGFR 63.7 mL/min/1.73     Narrative:      GFR Normal >60  Chronic Kidney Disease <60  Kidney Failure <15      POC Glucose Once [[id number]]  (Abnormal) Collected: 06/07/23 0717    Specimen: Blood Updated: 06/07/23 0740     Glucose 136 mg/dL      Comment: RN NotifiedOperator: [id number] NAZRAIO ALISEMeter ID: [id number]       CBC Auto Differential [[id number]]  (Abnormal) Collected: 06/07/23 0710    Specimen: Blood Updated:  06/07/23 0721     WBC 5.11 10*3/mm3      RBC 4.10 10*6/mm3      Hemoglobin 11.2 g/dL      Hematocrit 35.1 %      MCV 85.6 fL      MCH 27.3 pg      MCHC 31.9 g/dL      RDW 14.7 %      RDW-SD 46.3 fl      MPV 10.1 fL      Platelets 193 10*3/mm3      Neutrophil % 54.0 %      Lymphocyte % 30.7 %      Monocyte % 10.6 %      Eosinophil % 3.3 %      Basophil % 0.8 %      Immature Grans % 0.6 %      Neutrophils, Absolute 2.76 10*3/mm3      Lymphocytes, Absolute 1.57 10*3/mm3      Monocytes, Absolute 0.54 10*3/mm3      Eosinophils, Absolute 0.17 10*3/mm3      Basophils, Absolute 0.04 10*3/mm3      Immature Grans, Absolute 0.03 10*3/mm3      nRBC 0.0 /100 WBC     POC Glucose Once [[id number]]  (Abnormal) Collected: 06/06/23 1948    Specimen: Blood Updated: 06/06/23 2131     Glucose 229 mg/dL      Comment: RN NotifiedOperator: [id number] [facility] [id number]       POC Glucose Once [[id number]]  (Abnormal) Collected: 06/06/23 1611    Specimen: Blood Updated: 06/06/23 1624     Glucose 164 mg/dL      Comment: RN NotifiedOperator: [id number] NAZARIO ALISEMeter ID: [id number]       POC Glucose Once [[id number]]  (Abnormal) Collected: 06/06/23 1141    Specimen: Blood Updated: 06/06/23 1203     Glucose 218 mg/dL      Comment: RN NotifiedOperator: [id number] NAZARIO ALISEMeter ID: [id number]                 Chief Complaint on Day of Discharge: bacteremia     Hospital Course:    Please see admission history and physical for patient's initial presentation. During this hospitalization, the following problems were addressed...     Fever     Came from ARU     Blood cultures positive and follow up so far negative      Bacteremia by Enterobacter Cloacae     Has started on Zosyn, seen by podiatrist antibiotics for 2 weeks since surgical wound is clean; and then plan to transition to oral at that point. Will plan for outpatient follow up with Dr. Thompson prior to oral transition.      Sepsis (POA)     Due to above, by the same bacteria     " Continue with current antibiotics     Resolving      Constipation     On bowel protocol     Chronic medical problems     Essential hypertension     Type II DM     Hx of cardiac arrhythmias     Condition on Discharge:  stable       Physical Exam on Discharge:  /66 (BP Location: Left arm, Patient Position: Sitting)   Pulse 94   Temp 98.1 °F (36.7 °C) (Temporal)   Resp 18   Ht 193 cm (75.98\")   Wt (!) 145 kg (319 lb)   SpO2 97%   BMI 38.85 kg/m²   Physical Exam  Vitals reviewed.   Constitutional:       General: He is not in acute distress.     Appearance: He is well-developed.   HENT:      Head: Normocephalic and atraumatic.      Nose: Nose normal.   Eyes:      Conjunctiva/sclera: Conjunctivae normal.   Cardiovascular:      Rate and Rhythm: Normal rate.   Pulmonary:      Effort: Pulmonary effort is normal. No respiratory distress.      Breath sounds: No wheezing or rales.   Musculoskeletal:         General: Normal range of motion.      Cervical back: Normal range of motion and neck supple.   Skin:     General: Skin is warm.      Comments: Boot on   Neurological:      Mental Status: He is alert and oriented to person, place, and time.   Psychiatric:         Behavior: Behavior normal.         Discharge Disposition:  Skilled Nursing Facility (DC - External)    Discharge Medications:     Discharge Medications        New Medications        Instructions Start Date   acetaminophen 325 MG tablet  Commonly known as: TYLENOL   650 mg, Oral, Every 6 Hours PRN      apixaban 5 MG tablet tablet  Commonly known as: ELIQUIS   5 mg, Oral, Every 12 Hours Scheduled      ondansetron 4 MG tablet  Commonly known as: ZOFRAN   4 mg, Oral, Every 6 Hours PRN      piperacillin-tazobactam  Commonly known as: ZOSYN   3.375 g, Intravenous, Every 8 Hours             Continue These Medications        Instructions Start Date   ascorbic acid 1000 MG tablet  Commonly known as: VITAMIN C   1,000 mg, Oral, Nightly      Cholecalciferol 125 " MCG (5000 UT) tablet   5,000 Units, Oral, Nightly      dronedarone 400 MG tablet  Commonly known as: MULTAQ   400 mg, Oral, Nightly      fexofenadine 180 MG tablet  Commonly known as: ALLEGRA   1 tablet, Oral, Nightly      fluticasone 50 MCG/ACT nasal spray  Commonly known as: FLONASE   2 sprays, Nasal, As Needed      glimepiride 4 MG tablet  Commonly known as: AMARYL   4 mg, Oral, Nightly      magnesium oxide 400 MG tablet  Commonly known as: MAG-OX   400 mg, Oral, 2 Times Daily      metFORMIN 1000 MG tablet  Commonly known as: GLUCOPHAGE   1,000 mg, Oral, 2 Times Daily With Meals      metoprolol succinate XL 25 MG 24 hr tablet  Commonly known as: TOPROL-XL   25 mg, Oral, 2 Times Daily, 0.5 tablet       multivitamin tablet tablet   Oral, Nightly      ONE TOUCH ULTRA 2 w/Device kit   No dose, route, or frequency recorded.      ONE TOUCH ULTRA TEST test strip  Generic drug: glucose blood   USE TO TEST BLOOD SUGAR THREE TIMES A DAY      Zinc 50 MG tablet   1 tablet, Oral, Nightly             Stop These Medications      clindamycin 300 MG capsule  Commonly known as: CLEOCIN     losartan 25 MG tablet  Commonly known as: COZAAR            ASK your doctor about these medications        Instructions Start Date   furosemide 20 MG tablet  Commonly known as: Lasix   20 mg, Oral, Daily      Probiotic 1-250 BILLION-MG capsule   1 capsule, Oral, 2 Times Daily               I have utilized all available immediate resources to obtain, update, or review the patient's current medications (including all prescriptions, over-the-counter products, herbals, cannabis/cannabidiol products, and vitamin/mineral/dietary (nutritional) supplements).       Discharge Diet:   Diet Instructions       Advance Diet As Tolerated -Target Diet: regular      Target Diet: regular            Activity at Discharge:   Activity Instructions       Other Activity Instructions      Activity Instructions: per PT            Follow-up Appointments:   Future  Appointments   Date Time Provider Department Center   6/13/2023  3:00 PM Marco A Thompson, DPM [facility]       Test Results Pending at Discharge:     Time: >30 minutes          This document has been electronically signed by Bernadine Back MD on June 8, 2023 11:43 CDT

## 2023-06-13 ENCOUNTER — OFFICE VISIT (OUTPATIENT)
Dept: PODIATRY | Facility: CLINIC | Age: 63
End: 2023-06-13
Payer: MEDICARE

## 2023-06-13 VITALS
WEIGHT: 315 LBS | OXYGEN SATURATION: 96 % | HEIGHT: 75 IN | DIASTOLIC BLOOD PRESSURE: 78 MMHG | BODY MASS INDEX: 39.17 KG/M2 | SYSTOLIC BLOOD PRESSURE: 123 MMHG | HEART RATE: 92 BPM

## 2023-06-13 DIAGNOSIS — M14.672 CHARCOT'S JOINT OF LEFT FOOT: Primary | ICD-10-CM

## 2023-06-13 NOTE — PROGRESS NOTES
Emre Lisa  1960  63 y.o. male  PCP: Rasheed Arriaga 04/10/2023  BS: Federico      06/13/2023    Chief Complaint   Patient presents with   • Left Foot - Follow-up   • Left Ankle - Follow-up       History of Present Illness  Emre Lisa is a 63 y.o.male presents to clinic today for his post operative appointment. He had TIBIAL CALCANEAL ARTHRODESIS on 5/15/23. Doing well post operatively.       Past Medical History:   Diagnosis Date   • Arthritis    • Atrial fibrillation    • Diabetes mellitus    • Diabetic foot ulcer associated with type 2 diabetes mellitus     left great toe   • Hallux malleus of left foot    • Hammer toe    • Hypertension    • MI (myocardial infarction)    • Neuropathy in diabetes    • Onychomycosis    • Presence of biventricular cardiac pacemaker    • Rheumatoid arthritis          Past Surgical History:   Procedure Laterality Date   • AMPUTATION DIGIT Right 03/05/2017    Procedure: RIGHT AMPUTATION TRANSMETATRASAL , RECESSION GASTROCNEMOUS;  Surgeon: Marco A Thompson DPM;  Location: [facility];  Service:    • ANKLE FUSION Left 04/13/2023    Procedure: REDUCTION OF LEFT ANKLE DEFORMITY WITH APPLICATION OF EXTERNAL FIXATOR;  Surgeon: Marco A Thompson DPM;  Location: [facility] OR;  Service: Podiatry;  Laterality: Left;   • CARDIAC CATHETERIZATION     • CARDIAC DEFIBRILLATOR PLACEMENT  2010, 2016   • CARPAL TUNNEL RELEASE  2015    elbow and wrist left arm   • FOOT FUSION Left 05/15/2023    Procedure: REMOVAL OF EXTERNAL FIXATOR LEFT LOWER EXTREMITY WITH TIBIAL TALOCALCANEAL ARTHRODESIS  AND ALL INDICATED PROCEDURES;  Surgeon: Marco A Thompson DPM;  Location: [facility] OR;  Service: Podiatry;  Laterality: Left;   • FOOT IRRIGATION, DEBRIDEMENT AND REPAIR Left 03/07/2018    Procedure: FOOT IRRIGATION, DEBRIDEMENT AND REPAIR;  Surgeon: Marco A Thompson DPM;  Location: [facility];  Service:    • FOOT IRRIGATION, DEBRIDEMENT AND REPAIR Left 03/10/2018    Procedure: FOOT IRRIGATION, DEBRIDEMENT AND  REPAIR;  Surgeon: Marco A Thompson DPM;  Location: [facility];  Service: Podiatry   • FOOT WOUND CLOSURE Right 03/02/2017    Procedure: INCISION AND DRAINAGE, RIGHT FOOT;  Surgeon: Tung Rosenthal DPM;  Location: [facility] OR;  Service:    • HAMMER TOE REPAIR Left 02/15/2018    Procedure: HAMMERTOE CORRECTION LEFT SECOND, THIRD AND FOURTH TOES AND HALLUX INTERPHALANGEAL JOINT ARTHRODESIS LEFT FOOT (Micro Asnis, 4.0 & 5.0 Asnis)      (c-arm);  Surgeon: Marco A Thompson DPM;  Location: [facility] OR;  Service:    • HARDWARE REMOVAL Left 03/05/2018    Procedure: ANKLE/FOOT HARDWARE REMOVAL;  Surgeon: Marco A Thompson DPM;  Location: [facility] OR;  Service:    • INCISION AND DRAINAGE LEG Left 03/05/2018    Procedure: INCISION AND DRAINAGE LOWER EXTREMITY;  Surgeon: Marco A Thompson DPM;  Location: [facility] OR;  Service:    • PLANTAR FASCIA RELEASE Left 11/21/2019    Procedure: PLANTAR PLANING LEFT FOOT AND ALL OTHER INDICATED PROCEDURES;  Surgeon: Marco A Thompson DPM;  Location: [facility];  Service: Podiatry   • TOE AMPUTATION Right 2016   • TONSILECTOMY, ADENOIDECTOMY, BILATERAL MYRINGOTOMY AND TUBES      age 25         Family History   Problem Relation Age of Onset   • Diabetes Father    • Stroke Father    • No Known Problems Maternal Grandmother    • No Known Problems Maternal Grandfather    • No Known Problems Paternal Grandmother    • No Known Problems Paternal Grandfather    • No Known Problems Daughter    • No Known Problems Daughter    • No Known Problems Son    • Heart disease Other    • Hypertension Other        Allergies   Allergen Reactions   • Heparin Other (See Comments)     Heparin induce thrombocytopenia    • Januvia [Sitagliptin] Hives   • Lipitor [Atorvastatin] Other (See Comments)     Muscle Cramps...   • Shellfish Allergy Swelling and Other (See Comments)     Age 13 this occurred doesn't remember what he ate swelled lips and face   • Sulfa Antibiotics Rash     Pt was age of 4 when he was told by his family he had  a reaction, pt is unsure        Social History     Socioeconomic History   • Marital status:    Tobacco Use   • Smoking status: Never   • Smokeless tobacco: Never   Vaping Use   • Vaping Use: Never used   Substance and Sexual Activity   • Alcohol use: Yes     Comment: social, once every three months   • Drug use: No   • Sexual activity: Defer         Current Outpatient Medications   Medication Sig Dispense Refill   • acetaminophen (TYLENOL) 325 MG tablet Take 2 tablets by mouth Every 6 (Six) Hours As Needed for Mild Pain. 60 tablet 0   • apixaban (ELIQUIS) 5 MG tablet tablet Take 1 tablet by mouth Every 12 (Twelve) Hours. Indications: DVT/PE (active thrombosis) 60 tablet 0   • ascorbic acid (VITAMIN C) 1000 MG tablet Take 1 tablet by mouth Every Night.     • Bacillus Coagulans-Inulin (Probiotic) 1-250 BILLION-MG capsule Take 1 capsule by mouth 2 (Two) Times a Day. 30 capsule 1   • Blood Glucose Monitoring Suppl (ONE TOUCH ULTRA 2) w/Device kit      • Cholecalciferol 125 MCG (5000 UT) tablet Take 1 tablet by mouth Every Night.     • dronedarone (MULTAQ) 400 MG tablet Take 1 tablet by mouth Every Night.     • fexofenadine (ALLEGRA) 180 MG tablet Take 1 tablet by mouth Every Night.     • fluticasone (FLONASE) 50 MCG/ACT nasal spray 2 sprays into the nostril(s) as directed by provider As Needed for Rhinitis.     • furosemide (Lasix) 20 MG tablet Take 1 tablet by mouth Daily. 10 tablet 0   • glimepiride (AMARYL) 4 MG tablet Take 1 tablet by mouth Every Night.     • magnesium oxide (MAG-OX) 400 MG tablet Take 1 tablet by mouth 2 (Two) Times a Day.     • metFORMIN (GLUCOPHAGE) 1000 MG tablet Take 1 tablet by mouth 2 (Two) Times a Day With Meals.     • metoprolol succinate XL (TOPROL-XL) 25 MG 24 hr tablet Take 1 tablet by mouth 2 (Two) Times a Day. 0.5 tablet     • multivitamin (THERAGRAN) tablet tablet Take  by mouth Every Night.     • ondansetron (ZOFRAN) 4 MG tablet Take 1 tablet by mouth Every 6 (Six) Hours As  "Needed for Nausea or Vomiting. 30 tablet 0   • ONE TOUCH ULTRA TEST test strip USE TO TEST BLOOD SUGAR THREE TIMES A DAY  1   • piperacillin-tazobactam (ZOSYN) 3.375 g/100 mL 0.9% NS IVPB (mbp) Infuse 100 mL into a venous catheter Every 8 (Eight) Hours for 29 doses. Indications: Bacteria in the Blood 2900 mL 0   • Zinc 50 MG tablet Take 1 tablet by mouth Every Night.       No current facility-administered medications for this visit.       Review of Systems   Constitutional: Positive for activity change.   Cardiovascular: Positive for leg swelling.   Psychiatric/Behavioral: Negative.          OBJECTIVE    /78   Pulse 92   Ht 190.5 cm (75\")   Wt (!) 145 kg (319 lb 10.7 oz)   SpO2 96%   BMI 39.96 kg/m²     Physical Exam  Vitals reviewed.   Constitutional:       General: He is not in acute distress.     Appearance: He is well-developed.   Cardiovascular:      Pulses:           Dorsalis pedis pulses are 2+ on the right side and 2+ on the left side.        Posterior tibial pulses are 2+ on the right side and 2+ on the left side.   Pulmonary:      Effort: Pulmonary effort is normal.   Musculoskeletal:      Right lower leg: No edema.      Left lower leg: Edema present.      Left ankle: Swelling present. Decreased range of motion. Normal pulse.      Left Achilles Tendon: Normal.   Feet:      Right foot:      Skin integrity: Skin integrity normal.      Left foot:      Skin integrity: Skin integrity normal.   Skin:     General: Skin is warm and dry.      Capillary Refill: Capillary refill takes less than 2 seconds.   Neurological:      Mental Status: He is alert and oriented to person, place, and time.      Gait: Gait is intact. Gait normal.   Psychiatric:         Behavior: Behavior normal. Behavior is cooperative.         Thought Content: Thought content normal. Thought content is not delusional.                Procedures        ASSESSMENT AND PLAN    Diagnoses and all orders for this visit:    1. Charcot's joint " of left foot (Primary)  -     XR Tibia Fibula 2 View Left        -Patient doing very well postoperatively.  He is currently weightbearing in a cam boot.  Continue IV antibiotics at nursing home.  Tentative plan for discharge June 18.  Unna boot applied today.  Recheck 1 week          This document has been electronically signed by Marco A Thompson DPM on June 13, 2023 16:43 CDT     6/13/2023  16:43 CDT

## 2023-06-14 ENCOUNTER — TELEPHONE (OUTPATIENT)
Dept: PODIATRY | Facility: CLINIC | Age: 63
End: 2023-06-14
Payer: COMMERCIAL

## 2023-06-14 NOTE — TELEPHONE ENCOUNTER
PT REQUESTS A CALL BACK RE WANTS TO KNOW WHAT WILL BE DONE WITH HIS PIC LINE WHEN HE GETS OUT OF REHAB ON SUNDAY.  CALL BACK  #[phone].  THANK YOU.

## 2023-06-15 ENCOUNTER — TELEPHONE (OUTPATIENT)
Dept: PODIATRY | Facility: CLINIC | Age: 63
End: 2023-06-15
Payer: COMMERCIAL

## 2023-06-15 NOTE — PROGRESS NOTES
Enter Query Response Below      Query Response:           PE being actively treated        If applicable, please update the problem list.

## 2023-06-19 ENCOUNTER — TELEPHONE (OUTPATIENT)
Dept: PODIATRY | Facility: CLINIC | Age: 63
End: 2023-06-19
Payer: MEDICARE

## 2023-06-19 RX ORDER — LEVOFLOXACIN 500 MG/1
500 TABLET, FILM COATED ORAL DAILY
Qty: 21 TABLET | Refills: 0 | Status: SHIPPED | OUTPATIENT
Start: 2023-06-19

## 2023-06-19 NOTE — TELEPHONE ENCOUNTER
PT REQUESTS A CALL BACK RE WAS RELEASED FROM REHAB LAST AND NIGHT AND WANTS TO KNOW IF ANTIBIOTICS ARE GOING TO BE CALLED IN TO [facility].  CALL BACK # [phone].  THANK YOU.

## 2023-07-13 PROBLEM — M17.0 PRIMARY OSTEOARTHRITIS OF KNEES, BILATERAL: Status: ACTIVE | Noted: 2023-07-13

## 2023-07-16 ENCOUNTER — APPOINTMENT (OUTPATIENT)
Dept: CT IMAGING | Facility: HOSPITAL | Age: 63
DRG: 872 | End: 2023-07-16
Payer: MEDICARE

## 2023-07-16 ENCOUNTER — HOSPITAL ENCOUNTER (INPATIENT)
Facility: HOSPITAL | Age: 63
LOS: 5 days | Discharge: HOME | DRG: 872 | End: 2023-07-22
Attending: FAMILY MEDICINE | Admitting: HOSPITALIST
Payer: MEDICARE

## 2023-07-16 ENCOUNTER — APPOINTMENT (OUTPATIENT)
Dept: GENERAL RADIOLOGY | Facility: HOSPITAL | Age: 63
DRG: 872 | End: 2023-07-16
Payer: MEDICARE

## 2023-07-16 DIAGNOSIS — Z74.09 IMPAIRED FUNCTIONAL MOBILITY, BALANCE, GAIT, AND ENDURANCE: ICD-10-CM

## 2023-07-16 DIAGNOSIS — Z74.09 IMPAIRED MOBILITY AND ACTIVITIES OF DAILY LIVING: ICD-10-CM

## 2023-07-16 DIAGNOSIS — R50.9 FEVER OF UNKNOWN ORIGIN: Primary | ICD-10-CM

## 2023-07-16 DIAGNOSIS — A41.89 SEPSIS DUE TO OTHER ETIOLOGY: ICD-10-CM

## 2023-07-16 DIAGNOSIS — Z78.9 IMPAIRED MOBILITY AND ACTIVITIES OF DAILY LIVING: ICD-10-CM

## 2023-07-16 DIAGNOSIS — A41.4: ICD-10-CM

## 2023-07-16 PROBLEM — A41.9 SEPSIS: Status: ACTIVE | Noted: 2023-07-16

## 2023-07-16 LAB
ALBUMIN SERPL-MCNC: 3.8 G/DL (ref 3.5–5.2)
ALBUMIN/GLOB SERPL: 1.1 G/DL
ALP SERPL-CCNC: 106 U/L (ref 39–117)
ALT SERPL W P-5'-P-CCNC: 23 U/L (ref 1–41)
AMPHET+METHAMPHET UR QL: NEGATIVE
AMPHETAMINES UR QL: NEGATIVE
ANION GAP SERPL CALCULATED.3IONS-SCNC: 17 MMOL/L (ref 5–15)
AST SERPL-CCNC: 17 U/L (ref 1–40)
B PARAPERT DNA SPEC QL NAA+PROBE: NOT DETECTED
B PERT DNA SPEC QL NAA+PROBE: NOT DETECTED
BACTERIA UR QL AUTO: ABNORMAL /HPF
BARBITURATES UR QL SCN: NEGATIVE
BASOPHILS # BLD AUTO: 0.01 10*3/MM3 (ref 0–0.2)
BASOPHILS NFR BLD AUTO: 0.2 % (ref 0–1.5)
BENZODIAZ UR QL SCN: NEGATIVE
BILIRUB SERPL-MCNC: 1.3 MG/DL (ref 0–1.2)
BILIRUB UR QL STRIP: NEGATIVE
BUN SERPL-MCNC: 28 MG/DL (ref 8–23)
BUN/CREAT SERPL: 24.1 (ref 7–25)
BUPRENORPHINE SERPL-MCNC: NEGATIVE NG/ML
C PNEUM DNA NPH QL NAA+NON-PROBE: NOT DETECTED
CALCIUM SPEC-SCNC: 8.5 MG/DL (ref 8.6–10.5)
CANNABINOIDS SERPL QL: NEGATIVE
CHLORIDE SERPL-SCNC: 96 MMOL/L (ref 98–107)
CLARITY UR: CLEAR
CO2 SERPL-SCNC: 20 MMOL/L (ref 22–29)
COCAINE UR QL: NEGATIVE
COLOR UR: YELLOW
CREAT SERPL-MCNC: 1.16 MG/DL (ref 0.76–1.27)
CRP SERPL-MCNC: 11 MG/DL (ref 0–0.5)
D-LACTATE SERPL-SCNC: 2.3 MMOL/L (ref 0.5–2)
D-LACTATE SERPL-SCNC: 2.5 MMOL/L (ref 0.5–2)
D-LACTATE SERPL-SCNC: 2.5 MMOL/L (ref 0.5–2)
D-LACTATE SERPL-SCNC: 4.7 MMOL/L (ref 0.5–2)
DEPRECATED RDW RBC AUTO: 52.3 FL (ref 37–54)
EGFRCR SERPLBLD CKD-EPI 2021: 71.7 ML/MIN/1.73
EOSINOPHIL # BLD AUTO: 0 10*3/MM3 (ref 0–0.4)
EOSINOPHIL NFR BLD AUTO: 0 % (ref 0.3–6.2)
ERYTHROCYTE [DISTWIDTH] IN BLOOD BY AUTOMATED COUNT: 16.9 % (ref 12.3–15.4)
ETHANOL BLD-MCNC: <10 MG/DL (ref 0–10)
ETHANOL UR QL: <0.01 %
FENTANYL UR-MCNC: NEGATIVE NG/ML
FLUAV SUBTYP SPEC NAA+PROBE: NOT DETECTED
FLUBV RNA ISLT QL NAA+PROBE: NOT DETECTED
GLOBULIN UR ELPH-MCNC: 3.4 GM/DL
GLUCOSE BLDC GLUCOMTR-MCNC: 134 MG/DL (ref 70–130)
GLUCOSE BLDC GLUCOMTR-MCNC: 152 MG/DL (ref 70–130)
GLUCOSE BLDC GLUCOMTR-MCNC: 228 MG/DL (ref 70–130)
GLUCOSE SERPL-MCNC: 242 MG/DL (ref 65–99)
GLUCOSE UR STRIP-MCNC: NEGATIVE MG/DL
HADV DNA SPEC NAA+PROBE: NOT DETECTED
HCOV 229E RNA SPEC QL NAA+PROBE: NOT DETECTED
HCOV HKU1 RNA SPEC QL NAA+PROBE: NOT DETECTED
HCOV NL63 RNA SPEC QL NAA+PROBE: NOT DETECTED
HCOV OC43 RNA SPEC QL NAA+PROBE: NOT DETECTED
HCT VFR BLD AUTO: 33.9 % (ref 37.5–51)
HGB BLD-MCNC: 11.3 G/DL (ref 13–17.7)
HGB UR QL STRIP.AUTO: NEGATIVE
HMPV RNA NPH QL NAA+NON-PROBE: NOT DETECTED
HOLD SPECIMEN: NORMAL
HOLD SPECIMEN: NORMAL
HPIV1 RNA ISLT QL NAA+PROBE: NOT DETECTED
HPIV2 RNA SPEC QL NAA+PROBE: NOT DETECTED
HPIV3 RNA NPH QL NAA+PROBE: NOT DETECTED
HPIV4 P GENE NPH QL NAA+PROBE: NOT DETECTED
HYALINE CASTS UR QL AUTO: ABNORMAL /LPF
IMM GRANULOCYTES # BLD AUTO: 0.03 10*3/MM3 (ref 0–0.05)
IMM GRANULOCYTES NFR BLD AUTO: 0.6 % (ref 0–0.5)
INR PPP: 1.46 (ref 0.8–1.2)
KETONES UR QL STRIP: ABNORMAL
LEUKOCYTE ESTERASE UR QL STRIP.AUTO: NEGATIVE
LYMPHOCYTES # BLD AUTO: 0.43 10*3/MM3 (ref 0.7–3.1)
LYMPHOCYTES NFR BLD AUTO: 7.9 % (ref 19.6–45.3)
M PNEUMO IGG SER IA-ACNC: NOT DETECTED
MCH RBC QN AUTO: 28.3 PG (ref 26.6–33)
MCHC RBC AUTO-ENTMCNC: 33.3 G/DL (ref 31.5–35.7)
MCV RBC AUTO: 84.8 FL (ref 79–97)
METHADONE UR QL SCN: NEGATIVE
MONOCYTES # BLD AUTO: 0.19 10*3/MM3 (ref 0.1–0.9)
MONOCYTES NFR BLD AUTO: 3.5 % (ref 5–12)
NEUTROPHILS NFR BLD AUTO: 4.77 10*3/MM3 (ref 1.7–7)
NEUTROPHILS NFR BLD AUTO: 87.8 % (ref 42.7–76)
NITRITE UR QL STRIP: NEGATIVE
NRBC BLD AUTO-RTO: 0 /100 WBC (ref 0–0.2)
OPIATES UR QL: NEGATIVE
OXYCODONE UR QL SCN: NEGATIVE
PCP UR QL SCN: NEGATIVE
PH UR STRIP.AUTO: <=5 [PH] (ref 5–9)
PLATELET # BLD AUTO: 112 10*3/MM3 (ref 140–450)
PMV BLD AUTO: 10.8 FL (ref 6–12)
POTASSIUM SERPL-SCNC: 4.1 MMOL/L (ref 3.5–5.2)
PROPOXYPH UR QL: NEGATIVE
PROT SERPL-MCNC: 7.2 G/DL (ref 6–8.5)
PROT UR QL STRIP: ABNORMAL
PROTHROMBIN TIME: 17.6 SECONDS (ref 11.1–15.3)
QT INTERVAL: 338 MS
QTC INTERVAL: 461 MS
RBC # BLD AUTO: 4 10*6/MM3 (ref 4.14–5.8)
RBC # UR STRIP: ABNORMAL /HPF
REF LAB TEST METHOD: ABNORMAL
RHINOVIRUS RNA SPEC NAA+PROBE: NOT DETECTED
RSV RNA NPH QL NAA+NON-PROBE: NOT DETECTED
SARS-COV-2 RNA NPH QL NAA+NON-PROBE: NOT DETECTED
SODIUM SERPL-SCNC: 133 MMOL/L (ref 136–145)
SP GR UR STRIP: 1.03 (ref 1–1.03)
SQUAMOUS #/AREA URNS HPF: ABNORMAL /HPF
TRICYCLICS UR QL SCN: NEGATIVE
TROPONIN T SERPL HS-MCNC: 30 NG/L
TSH SERPL DL<=0.05 MIU/L-ACNC: 0.44 UIU/ML (ref 0.27–4.2)
UROBILINOGEN UR QL STRIP: ABNORMAL
WBC # UR STRIP: ABNORMAL /HPF
WBC NRBC COR # BLD: 5.43 10*3/MM3 (ref 3.4–10.8)
WHOLE BLOOD HOLD COAG: NORMAL
WHOLE BLOOD HOLD SPECIMEN: NORMAL

## 2023-07-16 PROCEDURE — 87186 SC STD MICRODIL/AGAR DIL: CPT | Performed by: FAMILY MEDICINE

## 2023-07-16 PROCEDURE — 99285 EMERGENCY DEPT VISIT HI MDM: CPT

## 2023-07-16 PROCEDURE — 0202U NFCT DS 22 TRGT SARS-COV-2: CPT | Performed by: FAMILY MEDICINE

## 2023-07-16 PROCEDURE — 84443 ASSAY THYROID STIM HORMONE: CPT | Performed by: STUDENT IN AN ORGANIZED HEALTH CARE EDUCATION/TRAINING PROGRAM

## 2023-07-16 PROCEDURE — 82948 REAGENT STRIP/BLOOD GLUCOSE: CPT

## 2023-07-16 PROCEDURE — 80053 COMPREHEN METABOLIC PANEL: CPT | Performed by: FAMILY MEDICINE

## 2023-07-16 PROCEDURE — 25010000002 VANCOMYCIN 10 G RECONSTITUTED SOLUTION: Performed by: FAMILY MEDICINE

## 2023-07-16 PROCEDURE — 70450 CT HEAD/BRAIN W/O DYE: CPT

## 2023-07-16 PROCEDURE — 71045 X-RAY EXAM CHEST 1 VIEW: CPT

## 2023-07-16 PROCEDURE — 86140 C-REACTIVE PROTEIN: CPT | Performed by: STUDENT IN AN ORGANIZED HEALTH CARE EDUCATION/TRAINING PROGRAM

## 2023-07-16 PROCEDURE — G0378 HOSPITAL OBSERVATION PER HR: HCPCS

## 2023-07-16 PROCEDURE — 81001 URINALYSIS AUTO W/SCOPE: CPT | Performed by: FAMILY MEDICINE

## 2023-07-16 PROCEDURE — 87077 CULTURE AEROBIC IDENTIFY: CPT | Performed by: FAMILY MEDICINE

## 2023-07-16 PROCEDURE — 87040 BLOOD CULTURE FOR BACTERIA: CPT | Performed by: FAMILY MEDICINE

## 2023-07-16 PROCEDURE — P9612 CATHETERIZE FOR URINE SPEC: HCPCS

## 2023-07-16 PROCEDURE — 93010 ELECTROCARDIOGRAM REPORT: CPT | Performed by: INTERNAL MEDICINE

## 2023-07-16 PROCEDURE — 84484 ASSAY OF TROPONIN QUANT: CPT | Performed by: FAMILY MEDICINE

## 2023-07-16 PROCEDURE — 83605 ASSAY OF LACTIC ACID: CPT | Performed by: FAMILY MEDICINE

## 2023-07-16 PROCEDURE — 82077 ASSAY SPEC XCP UR&BREATH IA: CPT | Performed by: FAMILY MEDICINE

## 2023-07-16 PROCEDURE — 36415 COLL VENOUS BLD VENIPUNCTURE: CPT | Performed by: FAMILY MEDICINE

## 2023-07-16 PROCEDURE — 0 CEFEPIME PER 500 MG: Performed by: STUDENT IN AN ORGANIZED HEALTH CARE EDUCATION/TRAINING PROGRAM

## 2023-07-16 PROCEDURE — 80307 DRUG TEST PRSMV CHEM ANLYZR: CPT | Performed by: FAMILY MEDICINE

## 2023-07-16 PROCEDURE — 85025 COMPLETE CBC W/AUTO DIFF WBC: CPT | Performed by: FAMILY MEDICINE

## 2023-07-16 PROCEDURE — 93005 ELECTROCARDIOGRAM TRACING: CPT | Performed by: FAMILY MEDICINE

## 2023-07-16 PROCEDURE — 25010000002 CEFTRIAXONE PER 250 MG: Performed by: FAMILY MEDICINE

## 2023-07-16 PROCEDURE — 87150 DNA/RNA AMPLIFIED PROBE: CPT | Performed by: FAMILY MEDICINE

## 2023-07-16 PROCEDURE — 85610 PROTHROMBIN TIME: CPT | Performed by: FAMILY MEDICINE

## 2023-07-16 RX ORDER — ALUMINA, MAGNESIA, AND SIMETHICONE 2400; 2400; 240 MG/30ML; MG/30ML; MG/30ML
15 SUSPENSION ORAL EVERY 6 HOURS PRN
Status: DISCONTINUED | OUTPATIENT
Start: 2023-07-16 | End: 2023-07-22 | Stop reason: HOSPADM

## 2023-07-16 RX ORDER — ASCORBIC ACID 500 MG
1000 TABLET ORAL NIGHTLY
Status: DISCONTINUED | OUTPATIENT
Start: 2023-07-16 | End: 2023-07-22 | Stop reason: HOSPADM

## 2023-07-16 RX ORDER — SODIUM CHLORIDE 0.9 % (FLUSH) 0.9 %
10 SYRINGE (ML) INJECTION AS NEEDED
Status: DISCONTINUED | OUTPATIENT
Start: 2023-07-16 | End: 2023-07-22 | Stop reason: HOSPADM

## 2023-07-16 RX ORDER — DIPHENHYDRAMINE HCL 25 MG
25 CAPSULE ORAL NIGHTLY PRN
Status: DISCONTINUED | OUTPATIENT
Start: 2023-07-16 | End: 2023-07-22 | Stop reason: HOSPADM

## 2023-07-16 RX ORDER — NALOXONE HCL 0.4 MG/ML
0.4 VIAL (ML) INJECTION
Status: DISCONTINUED | OUTPATIENT
Start: 2023-07-16 | End: 2023-07-22 | Stop reason: HOSPADM

## 2023-07-16 RX ORDER — ONDANSETRON 4 MG/1
4 TABLET, FILM COATED ORAL EVERY 6 HOURS PRN
Status: DISCONTINUED | OUTPATIENT
Start: 2023-07-16 | End: 2023-07-22 | Stop reason: HOSPADM

## 2023-07-16 RX ORDER — ACETAMINOPHEN 500 MG
1000 TABLET ORAL ONCE
Status: COMPLETED | OUTPATIENT
Start: 2023-07-16 | End: 2023-07-16

## 2023-07-16 RX ORDER — CETIRIZINE HYDROCHLORIDE 10 MG/1
10 TABLET ORAL NIGHTLY
Status: DISCONTINUED | OUTPATIENT
Start: 2023-07-16 | End: 2023-07-22 | Stop reason: HOSPADM

## 2023-07-16 RX ORDER — SODIUM CHLORIDE 0.9 % (FLUSH) 0.9 %
10 SYRINGE (ML) INJECTION EVERY 12 HOURS SCHEDULED
Status: DISCONTINUED | OUTPATIENT
Start: 2023-07-16 | End: 2023-07-22 | Stop reason: HOSPADM

## 2023-07-16 RX ORDER — SODIUM CHLORIDE 9 MG/ML
40 INJECTION, SOLUTION INTRAVENOUS AS NEEDED
Status: DISCONTINUED | OUTPATIENT
Start: 2023-07-16 | End: 2023-07-22 | Stop reason: HOSPADM

## 2023-07-16 RX ORDER — MORPHINE SULFATE 2 MG/ML
1 INJECTION, SOLUTION INTRAMUSCULAR; INTRAVENOUS EVERY 4 HOURS PRN
Status: DISCONTINUED | OUTPATIENT
Start: 2023-07-16 | End: 2023-07-22 | Stop reason: HOSPADM

## 2023-07-16 RX ORDER — ACETAMINOPHEN 500 MG
500 TABLET ORAL EVERY 6 HOURS PRN
Status: DISCONTINUED | OUTPATIENT
Start: 2023-07-16 | End: 2023-07-17

## 2023-07-16 RX ORDER — METOPROLOL SUCCINATE 25 MG/1
25 TABLET, EXTENDED RELEASE ORAL 2 TIMES DAILY
Status: DISCONTINUED | OUTPATIENT
Start: 2023-07-16 | End: 2023-07-22 | Stop reason: HOSPADM

## 2023-07-16 RX ORDER — ONDANSETRON 2 MG/ML
4 INJECTION INTRAMUSCULAR; INTRAVENOUS EVERY 6 HOURS PRN
Status: DISCONTINUED | OUTPATIENT
Start: 2023-07-16 | End: 2023-07-22 | Stop reason: HOSPADM

## 2023-07-16 RX ORDER — DIPHENOXYLATE HYDROCHLORIDE AND ATROPINE SULFATE 2.5; .025 MG/1; MG/1
1 TABLET ORAL NIGHTLY
Status: DISCONTINUED | OUTPATIENT
Start: 2023-07-16 | End: 2023-07-22 | Stop reason: HOSPADM

## 2023-07-16 RX ADMIN — CEFTRIAXONE SODIUM 2000 MG: 2 INJECTION, POWDER, FOR SOLUTION INTRAMUSCULAR; INTRAVENOUS at 10:09

## 2023-07-16 RX ADMIN — SODIUM CHLORIDE 1000 ML: 9 INJECTION, SOLUTION INTRAVENOUS at 10:09

## 2023-07-16 RX ADMIN — OXYCODONE HYDROCHLORIDE AND ACETAMINOPHEN 1000 MG: 500 TABLET ORAL at 21:04

## 2023-07-16 RX ADMIN — Medication 10 ML: at 21:04

## 2023-07-16 RX ADMIN — METOPROLOL SUCCINATE 25 MG: 25 TABLET, FILM COATED, EXTENDED RELEASE ORAL at 21:04

## 2023-07-16 RX ADMIN — Medication 5000 UNITS: at 21:03

## 2023-07-16 RX ADMIN — ACETAMINOPHEN 1000 MG: 500 TABLET, FILM COATED ORAL at 10:27

## 2023-07-16 RX ADMIN — APIXABAN 5 MG: 5 TABLET, FILM COATED ORAL at 21:04

## 2023-07-16 RX ADMIN — DRONEDARONE 400 MG: 400 TABLET, FILM COATED ORAL at 21:50

## 2023-07-16 RX ADMIN — VANCOMYCIN HYDROCHLORIDE 2250 MG: 10 INJECTION, POWDER, LYOPHILIZED, FOR SOLUTION INTRAVENOUS at 11:47

## 2023-07-16 RX ADMIN — ACETAMINOPHEN 500 MG: 500 TABLET, FILM COATED ORAL at 16:17

## 2023-07-16 RX ADMIN — ACETAMINOPHEN 500 MG: 500 TABLET, FILM COATED ORAL at 21:50

## 2023-07-16 RX ADMIN — Medication 10 ML: at 13:20

## 2023-07-16 RX ADMIN — CETIRIZINE HYDROCHLORIDE 10 MG: 10 TABLET, FILM COATED ORAL at 21:04

## 2023-07-16 RX ADMIN — THERA TABS 1 TABLET: TAB at 21:04

## 2023-07-16 RX ADMIN — CEFEPIME HYDROCHLORIDE 2000 MG: 2 INJECTION, POWDER, FOR SOLUTION INTRAVENOUS at 21:49

## 2023-07-16 RX ADMIN — Medication 400 MG: at 21:04

## 2023-07-16 RX ADMIN — SODIUM CHLORIDE 1000 ML: 9 INJECTION, SOLUTION INTRAVENOUS at 11:51

## 2023-07-16 NOTE — ED PROVIDER NOTES
Subjective   History of Present Illness  Patient is 63 years old with past medical history of A-fib, diabetes, diabetic foot ulcer, hypertension and MI and also biventricular cardiac pacemaker who presented here today because of change in mental status and some dysuria.  The wife said yesterday had a fever of 100.4.  This morning the wife said that he was talking out of his head and was not making any sense.  On the route with EMS he was nauseous he was given Zofran 4 mg IV x1.  Patient also said that he has some chills and sweating.    History provided by:  Patient   used: No      Review of Systems   Constitutional:  Positive for chills and fever.   Gastrointestinal:  Positive for nausea and vomiting.   All other systems reviewed and are negative.    Past Medical History:   Diagnosis Date    Arthritis     Atrial fibrillation     Diabetes mellitus     Diabetic foot ulcer associated with type 2 diabetes mellitus     left great toe    Hallux malleus of left foot     Hammer toe     Hypertension     MI (myocardial infarction)     Neuropathy in diabetes     Onychomycosis     Presence of biventricular cardiac pacemaker     Rheumatoid arthritis        Allergies   Allergen Reactions    Heparin Other (See Comments)     Heparin induce thrombocytopenia     Januvia [Sitagliptin] Hives    Lipitor [Atorvastatin] Other (See Comments)     Muscle Cramps...    Shellfish Allergy Swelling and Other (See Comments)     Age 13 this occurred doesn't remember what he ate swelled lips and face    Sulfa Antibiotics Rash     Pt was age of 4 when he was told by his family he had a reaction, pt is unsure        Past Surgical History:   Procedure Laterality Date    AMPUTATION DIGIT Right 03/05/2017    Procedure: RIGHT AMPUTATION TRANSMETATRASAL , RECESSION GASTROCNEMOUS;  Surgeon: Marco A Thompson DPM;  Location: [facility];  Service:     ANKLE FUSION Left 04/13/2023    Procedure: REDUCTION OF LEFT ANKLE DEFORMITY WITH  APPLICATION OF EXTERNAL FIXATOR;  Surgeon: Marco A Thompson DPM;  Location: [facility] OR;  Service: Podiatry;  Laterality: Left;    CARDIAC CATHETERIZATION      CARDIAC DEFIBRILLATOR PLACEMENT  2010, 2016    CARPAL TUNNEL RELEASE  2015    elbow and wrist left arm    FOOT FUSION Left 05/15/2023    Procedure: REMOVAL OF EXTERNAL FIXATOR LEFT LOWER EXTREMITY WITH TIBIAL TALOCALCANEAL ARTHRODESIS  AND ALL INDICATED PROCEDURES;  Surgeon: Marco A Thompson DPM;  Location: [facility] OR;  Service: Podiatry;  Laterality: Left;    FOOT IRRIGATION, DEBRIDEMENT AND REPAIR Left 03/07/2018    Procedure: FOOT IRRIGATION, DEBRIDEMENT AND REPAIR;  Surgeon: Marco A Thompson DPM;  Location: [facility] OR;  Service:     FOOT IRRIGATION, DEBRIDEMENT AND REPAIR Left 03/10/2018    Procedure: FOOT IRRIGATION, DEBRIDEMENT AND REPAIR;  Surgeon: Marco A Thompson DPM;  Location: [facility] OR;  Service: Podiatry    FOOT WOUND CLOSURE Right 03/02/2017    Procedure: INCISION AND DRAINAGE, RIGHT FOOT;  Surgeon: Tung Rosenthal DPM;  Location: [facility] OR;  Service:     HAMMER TOE REPAIR Left 02/15/2018    Procedure: HAMMERTOE CORRECTION LEFT SECOND, THIRD AND FOURTH TOES AND HALLUX INTERPHALANGEAL JOINT ARTHRODESIS LEFT FOOT (Micro Asnis, 4.0 & 5.0 Asnis)      (c-arm);  Surgeon: Marco A Thompson DPM;  Location: [facility] OR;  Service:     HARDWARE REMOVAL Left 03/05/2018    Procedure: ANKLE/FOOT HARDWARE REMOVAL;  Surgeon: Marco A Thompson DPM;  Location: [facility] OR;  Service:     INCISION AND DRAINAGE LEG Left 03/05/2018    Procedure: INCISION AND DRAINAGE LOWER EXTREMITY;  Surgeon: Marco A Thompson DPM;  Location: [facility] OR;  Service:     PLANTAR FASCIA RELEASE Left 11/21/2019    Procedure: PLANTAR PLANING LEFT FOOT AND ALL OTHER INDICATED PROCEDURES;  Surgeon: Marco A Thompson DPM;  Location: [facility] OR;  Service: Podiatry    TOE AMPUTATION Right 2016    TONSILECTOMY, ADENOIDECTOMY, BILATERAL MYRINGOTOMY AND TUBES      age 25       Family History   Problem Relation  Age of Onset    Diabetes Father     Stroke Father     No Known Problems Maternal Grandmother     No Known Problems Maternal Grandfather     No Known Problems Paternal Grandmother     No Known Problems Paternal Grandfather     No Known Problems Daughter     No Known Problems Daughter     No Known Problems Son     Heart disease Other     Hypertension Other        Social History     Socioeconomic History    Marital status:    Tobacco Use    Smoking status: Never    Smokeless tobacco: Never   Vaping Use    Vaping Use: Never used   Substance and Sexual Activity    Alcohol use: Yes     Comment: social, once every three months    Drug use: No    Sexual activity: Defer           Objective   Physical Exam  Vitals and nursing note reviewed.   Constitutional:       Appearance: He is well-developed. He is obese.   HENT:      Head: Normocephalic and atraumatic.      Mouth/Throat:      Mouth: Mucous membranes are moist.   Eyes:      Extraocular Movements: Extraocular movements intact.      Pupils: Pupils are equal, round, and reactive to light.   Cardiovascular:      Rate and Rhythm: Normal rate and regular rhythm.      Heart sounds: Normal heart sounds.   Pulmonary:      Effort: Pulmonary effort is normal.      Breath sounds: Normal breath sounds.   Abdominal:      General: Bowel sounds are normal.      Palpations: Abdomen is soft.   Musculoskeletal:         General: Normal range of motion.      Cervical back: Normal range of motion and neck supple.   Skin:     General: Skin is warm.      Capillary Refill: Capillary refill takes less than 2 seconds.      Comments: Patient has a boot on the left lower extremity.   Neurological:      Mental Status: He is alert and oriented to person, place, and time.      GCS: GCS eye subscore is 4. GCS verbal subscore is 5. GCS motor subscore is 6.   Psychiatric:         Mood and Affect: Mood normal.         Behavior: Behavior normal.       ECG 12 Lead      Date/Time: 7/16/2023 9:05  AM  Performed by: Jorge Luis Regan MD  Authorized by: Jorge Luis Regan MD   Interpreted by physician  Comparison: not compared with previous ECG   Rhythm: sinus tachycardia  Rate: tachycardic  BPM: 112  Conduction: conduction normal  Clinical impression: abnormal ECG             ED Course  ED Course as of 07/16/23 1103   Sun Jul 16, 2023   1056 Spoke to Dr. Martinez who accepted patient. [MO]      ED Course User Index  [MO] Jorge Luis Regan MD                                 Labs Reviewed   COMPREHENSIVE METABOLIC PANEL - Abnormal; Notable for the following components:       Result Value    Glucose 242 (*)     BUN 28 (*)     Sodium 133 (*)     Chloride 96 (*)     CO2 20.0 (*)     Calcium 8.5 (*)     Total Bilirubin 1.3 (*)     Anion Gap 17.0 (*)     All other components within normal limits    Narrative:     GFR Normal >60  Chronic Kidney Disease <60  Kidney Failure <15     PROTIME-INR - Abnormal; Notable for the following components:    Protime 17.6 (*)     INR 1.46 (*)     All other components within normal limits    Narrative:     Therapeutic range for most indications is 2.0-3.0 INR,  or 2.5-3.5 for mechanical heart valves.   SINGLE HSTROPONIN T - Abnormal; Notable for the following components:    HS Troponin T 30 (*)     All other components within normal limits    Narrative:     High Sensitive Troponin T Reference Range:  <10.0 ng/L- Negative Female for AMI  <15.0 ng/L- Negative Male for AMI  >=10 - Abnormal Female indicating possible myocardial injury.  >=15 - Abnormal Male indicating possible myocardial injury.   Clinicians would have to utilize clinical acumen, EKG, Troponin, and serial changes to determine if it is an Acute Myocardial Infarction or myocardial injury due to an underlying chronic condition.        URINALYSIS W/ MICROSCOPIC IF INDICATED (NO CULTURE) - Abnormal; Notable for the following components:    Ketones, UA Trace (*)     Protein, UA 30 mg/dL (1+) (*)     All other  components within normal limits   CBC WITH AUTO DIFFERENTIAL - Abnormal; Notable for the following components:    RBC 4.00 (*)     Hemoglobin 11.3 (*)     Hematocrit 33.9 (*)     RDW 16.9 (*)     Platelets 112 (*)     Neutrophil % 87.8 (*)     Lymphocyte % 7.9 (*)     Monocyte % 3.5 (*)     Eosinophil % 0.0 (*)     Immature Grans % 0.6 (*)     Lymphocytes, Absolute 0.43 (*)     All other components within normal limits   LACTIC ACID, PLASMA - Abnormal; Notable for the following components:    Lactate 4.7 (*)     All other components within normal limits   URINALYSIS, MICROSCOPIC ONLY - Abnormal; Notable for the following components:    RBC, UA 0-2 (*)     All other components within normal limits   POCT GLUCOSE FINGERSTICK - Abnormal; Notable for the following components:    Glucose 228 (*)     All other components within normal limits   URINE DRUG SCREEN - Normal    Narrative:     Cutoff For Drugs Screened:    Amphetamines               500 ng/ml  Barbiturates               200 ng/ml  Benzodiazepines            150 ng/ml  Cocaine                    150 ng/ml  Methadone                  200 ng/ml  Opiates                    100 ng/ml  Phencyclidine               25 ng/ml  THC                            50 ng/ml  Methamphetamine            500 ng/ml  Tricyclic Antidepressants  300 ng/ml  Oxycodone                  100 ng/ml  Propoxyphene               300 ng/ml  Buprenorphine               10 ng/ml    The normal value for all drugs tested is negative. This report includes unconfirmed screening results, with the cutoff values listed, to be used for medical treatment purposes only.  Unconfirmed results must not be used for non-medical purposes such as employment or legal testing.  Clinical consideration should be applied to any drug of abuse test, particularly when unconfirmed results are used.     FENTANYL, URINE - Normal    Narrative:     Negative Threshold:      Fentanyl 5 ng/mL     The normal value for the drug  tested is negative. This report includes final unconfirmed screening results to be used for medical treatment purposes only. Unconfirmed results must not be used for non-medical purposes such as employment or legal testing. Clinical consideration should be applied to any drug of abuse test, particularly when unconfirmed results are used.          BLOOD CULTURE   BLOOD CULTURE   RESPIRATORY PANEL PCR W/ COVID-19 (SARS-COV-2) TRICIA/SABI/ALFA/PAD/COR/MAD/RUBI IN-HOUSE, NP SWAB IN [email or web address], 3-4 HR TAT   RAINBOW DRAW    Narrative:     The following orders were created for panel order [place] Draw.  Procedure                               Abnormality         Status                     ---------                               -----------         ------                     Green Top (Gel)[[id number]]                                  Final result               Lavender Top[[id number]]                                     Final result               Gold Top - SST[[id number]]                                   Final result               Light Blue Top[[id number]]                                   Final result                 Please view results for these tests on the individual orders.   ETHANOL   LACTIC ACID, REFLEX   POCT GLUCOSE FINGERSTICK   CBC AND DIFFERENTIAL    Narrative:     The following orders were created for panel order CBC & Differential.  Procedure                               Abnormality         Status                     ---------                               -----------         ------                     CBC Auto Differential[[id number]]        Abnormal            Final result                 Please view results for these tests on the individual orders.   GREEN TOP   LAVENDER TOP   GOLD TOP - SST   LIGHT BLUE TOP       CT Head Without Contrast   Final Result   1. No acute intracranial process.            XR Chest 1 View                      Medical Decision Making  Patient is a 63 years old with past medical history of  A-fib, diabetes, foot ulcer, hypertension, MI and biventricular cardiac pacemaker who presented here today because of fever and chills at home and also change in mental status.  CT of the head was done which was unremarkable.  Chest x-ray was unremarkable.  Urinalysis was unremarkable.  Lactate is 4.7.  CBC did not show any white count.  Patient was started on Rocephin and Zithromax after blood culture was drawn and also urine culture.  Dr. Coronado was called who accepted patient.  Respiratory panel is pending.    Problems Addressed:  Fever of unknown origin: complicated acute illness or injury  Sepsis due to other etiology: complicated acute illness or injury    Amount and/or Complexity of Data Reviewed  Labs: ordered.  Radiology: ordered.  ECG/medicine tests: ordered and independent interpretation performed.    Risk  OTC drugs.  Prescription drug management.  Decision regarding hospitalization.        Final diagnoses:   Fever of unknown origin   Sepsis due to other etiology       ED Disposition  ED Disposition       ED Disposition   Decision to Admit    Condition   --    Comment   Level of Care: Telemetry [5]   Diagnosis: Sepsis [[id number]]   Admitting Physician: TILA MART [[id number]]   Attending Physician: TILA MART [[id number]]                 No follow-up provider specified.       Medication List      No changes were made to your prescriptions during this visit.            Jorge Luis Regan MD  07/16/23 1109       Jorge Luis Regan MD  07/16/23 1109

## 2023-07-16 NOTE — Clinical Note
Level of Care: Telemetry [5]   Diagnosis: Sepsis [[id number]]   Admitting Physician: TILA MART [[id number]]   Attending Physician: TILA MART [[id number]]

## 2023-07-16 NOTE — PLAN OF CARE
Goal Outcome Evaluation:              Outcome Evaluation: New patient to  east, adjusting well to unit. Vanc currently infusing. Denies pain. Wife at bedside, VSS.

## 2023-07-16 NOTE — SIGNIFICANT NOTE
07/16/23 1436   OTHER   Discipline physical therapist;occupational therapist   Rehab Time/Intention   Session Not Performed patient/family declined evaluation;patient/family declined, not feeling well   Recommendation   PT - Next Appointment 07/17/23   Recommendation   OT - Next Appointment 07/17/23     Initial PT/OT evaluations attempted.  Patient requests therapy come back tomorrow, c/o fatigue and recent fever.

## 2023-07-16 NOTE — ED NOTES
Nursing report ED to floor  Emre Lisa  63 y.o.  male    HPI:   Chief Complaint   Patient presents with    Altered Mental Status       Admitting doctor:   Chente Ross MD    Consulting provider(s):  Consults       No orders found from 6/17/2023 to 7/17/2023.             Admitting diagnosis:   There were no encounter diagnoses.    Code status:   Current Code Status       Date Active Code Status Order ID Comments User Context       7/16/2023 1055 CPR (Attempt to Resuscitate) [id number]  Chente Ross MD ED        Question Answer    Code Status (Patient has no pulse and is not breathing) CPR (Attempt to Resuscitate)    Medical Interventions (Patient has pulse or is breathing) Full Support    Level Of Support Discussed With Patient                    Allergies:   Heparin, Januvia [sitagliptin], Lipitor [atorvastatin], Shellfish allergy, and Sulfa antibiotics    Intake and Output  No intake or output data in the 24 hours ending 07/16/23 1057    Weight:       07/16/23  0902   Weight: (!) 146 kg (322 lb)       Most recent vitals:   Vitals:    07/16/23 0915 07/16/23 1001 07/16/23 1010 07/16/23 1030   BP: 112/56 109/56  107/60   BP Location:  Left arm     Patient Position:  Sitting     Pulse: 110 104  100   Resp:  18  18   Temp:   (!) 101.1 °F (38.4 °C)    TempSrc:   Rectal    SpO2: 93% 95%  94%   Weight:       Height:         Oxygen Therapy: room air    Active LDAs/IV Access:   Lines, Drains & Airways       Active LDAs       Name Placement date Placement time Site Days    Peripheral IV 07/16/23 0917 Anterior;Left Forearm 07/16/23 0917  Forearm  less than 1                    Labs (abnormal labs have a star):   Labs Reviewed   COMPREHENSIVE METABOLIC PANEL - Abnormal; Notable for the following components:       Result Value    Glucose 242 (*)     BUN 28 (*)     Sodium 133 (*)     Chloride 96 (*)     CO2 20.0 (*)     Calcium 8.5 (*)     Total Bilirubin 1.3 (*)     Anion Gap 17.0 (*)     All other  components within normal limits    Narrative:     GFR Normal >60  Chronic Kidney Disease <60  Kidney Failure <15     PROTIME-INR - Abnormal; Notable for the following components:    Protime 17.6 (*)     INR 1.46 (*)     All other components within normal limits    Narrative:     Therapeutic range for most indications is 2.0-3.0 INR,  or 2.5-3.5 for mechanical heart valves.   SINGLE HSTROPONIN T - Abnormal; Notable for the following components:    HS Troponin T 30 (*)     All other components within normal limits    Narrative:     High Sensitive Troponin T Reference Range:  <10.0 ng/L- Negative Female for AMI  <15.0 ng/L- Negative Male for AMI  >=10 - Abnormal Female indicating possible myocardial injury.  >=15 - Abnormal Male indicating possible myocardial injury.   Clinicians would have to utilize clinical acumen, EKG, Troponin, and serial changes to determine if it is an Acute Myocardial Infarction or myocardial injury due to an underlying chronic condition.        URINALYSIS W/ MICROSCOPIC IF INDICATED (NO CULTURE) - Abnormal; Notable for the following components:    Ketones, UA Trace (*)     Protein, UA 30 mg/dL (1+) (*)     All other components within normal limits   CBC WITH AUTO DIFFERENTIAL - Abnormal; Notable for the following components:    RBC 4.00 (*)     Hemoglobin 11.3 (*)     Hematocrit 33.9 (*)     RDW 16.9 (*)     Platelets 112 (*)     Neutrophil % 87.8 (*)     Lymphocyte % 7.9 (*)     Monocyte % 3.5 (*)     Eosinophil % 0.0 (*)     Immature Grans % 0.6 (*)     Lymphocytes, Absolute 0.43 (*)     All other components within normal limits   LACTIC ACID, PLASMA - Abnormal; Notable for the following components:    Lactate 4.7 (*)     All other components within normal limits   URINALYSIS, MICROSCOPIC ONLY - Abnormal; Notable for the following components:    RBC, UA 0-2 (*)     All other components within normal limits   POCT GLUCOSE FINGERSTICK - Abnormal; Notable for the following components:     Glucose 228 (*)     All other components within normal limits   URINE DRUG SCREEN - Normal    Narrative:     Cutoff For Drugs Screened:    Amphetamines               500 ng/ml  Barbiturates               200 ng/ml  Benzodiazepines            150 ng/ml  Cocaine                    150 ng/ml  Methadone                  200 ng/ml  Opiates                    100 ng/ml  Phencyclidine               25 ng/ml  THC                            50 ng/ml  Methamphetamine            500 ng/ml  Tricyclic Antidepressants  300 ng/ml  Oxycodone                  100 ng/ml  Propoxyphene               300 ng/ml  Buprenorphine               10 ng/ml    The normal value for all drugs tested is negative. This report includes unconfirmed screening results, with the cutoff values listed, to be used for medical treatment purposes only.  Unconfirmed results must not be used for non-medical purposes such as employment or legal testing.  Clinical consideration should be applied to any drug of abuse test, particularly when unconfirmed results are used.     FENTANYL, URINE - Normal    Narrative:     Negative Threshold:      Fentanyl 5 ng/mL     The normal value for the drug tested is negative. This report includes final unconfirmed screening results to be used for medical treatment purposes only. Unconfirmed results must not be used for non-medical purposes such as employment or legal testing. Clinical consideration should be applied to any drug of abuse test, particularly when unconfirmed results are used.          BLOOD CULTURE   BLOOD CULTURE   RESPIRATORY PANEL PCR W/ COVID-19 (SARS-COV-2) TRICIA/SABI/ALFA/PAD/COR/MAD/RUBI IN-HOUSE, NP SWAB IN [facility], 3-4 HR TAT   RAINBOW DRAW    Narrative:     The following orders were created for panel order [place] Draw.  Procedure                               Abnormality         Status                     ---------                               -----------         ------                     [facility]  (Gel)[[id number]]                                  Final result               Lavender Top[[id number]]                                     Final result               Gold Top - SST[[id number]]                                   Final result               Light Blue Top[[id number]]                                   Final result                 Please view results for these tests on the individual orders.   ETHANOL   LACTIC ACID, REFLEX   POCT GLUCOSE FINGERSTICK   CBC AND DIFFERENTIAL    Narrative:     The following orders were created for panel order CBC & Differential.  Procedure                               Abnormality         Status                     ---------                               -----------         ------                     CBC Auto Differential[[id number]]        Abnormal            Final result                 Please view results for these tests on the individual orders.   GREEN TOP   LAVENDER TOP   GOLD TOP - SST   LIGHT BLUE TOP       Meds given in ED:   Medications   sodium chloride 0.9 % flush 10 mL (has no administration in time range)   sodium chloride 0.9 % bolus 1,000 mL (1,000 mL Intravenous New Bag 7/16/23 1009)   sodium chloride 0.9 % bolus 1,000 mL (1,000 mL Intravenous New Bag 7/16/23 1009)   vancomycin 2250 mg/500 mL 0.9% NS IVPB ([facility]) (has no administration in time range)   cefTRIAXone (ROCEPHIN) 2000 mg/100 mL 0.9% NS IVPB (MBP) (2,000 mg Intravenous New Bag 7/16/23 1009)   acetaminophen (TYLENOL) tablet 1,000 mg (1,000 mg Oral Given 7/16/23 1027)           NIH Stroke Scale:       Isolation/Infection(s):  No active isolations   COVID (rule out)     COVID Testing  Collected yes  Resulted no    Nursing report ED to floor:  Mental status: axo  Ambulatory status: standby  Precautions: none    ED nurse phone [phone]- [phone]

## 2023-07-16 NOTE — PROGRESS NOTES
Pharmacokinetics by Pharmacy - Vancomycin    Emre Lisa is a 63 y.o. male initiated on vancomycin (day 1) for sepsis.  Patient is also receiving cefepime.    Objective:    Temp Readings from Last 1 Encounters:   07/16/23 99 °F (37.2 °C) (Oral)     WBC   Date Value Ref Range Status   07/16/2023 5.43 3.40 - 10.80 10*3/mm3 Final      C-Reactive Protein   Date Value Ref Range Status   07/16/2023 11.00 (H) 0.00 - 0.50 mg/dL Final     Lactate   Date Value Ref Range Status   07/16/2023 2.5 (C) 0.5 - 2.0 mmol/L Final   07/16/2023 4.7 (C) 0.5 - 2.0 mmol/L Final      Creatinine   Date Value Ref Range Status   07/16/2023 1.16 0.76 - 1.27 mg/dL Final       No results found for: VANCOPEAK, VANCOTROUGH, VANCORANDOM    Culture Results:  Microbiology Results (last 10 days)       Procedure Component Value - Date/Time    Respiratory Panel PCR w/COVID-19(SARS-CoV-2) TRICIA/SABI/ALFA/PAD/COR/MAD/RUBI In-House, NP Swab in UTM/VTM, 3-4 HR TAT - Swab, Nasopharynx [[id number]]  (Normal) Collected: 07/16/23 1035    Lab Status: Final result Specimen: Swab from Nasopharynx Updated: 07/16/23 1135     ADENOVIRUS, PCR Not Detected     Coronavirus 229E Not Detected     Coronavirus HKU1 Not Detected     Coronavirus NL63 Not Detected     Coronavirus OC43 Not Detected     COVID19 Not Detected     Human Metapneumovirus Not Detected     Human Rhinovirus/Enterovirus Not Detected     Influenza A PCR Not Detected     Influenza B PCR Not Detected     Parainfluenza Virus 1 Not Detected     Parainfluenza Virus 2 Not Detected     Parainfluenza Virus 3 Not Detected     Parainfluenza Virus 4 Not Detected     RSV, PCR Not Detected     Bordetella pertussis pcr Not Detected     Bordetella parapertussis PCR Not Detected     Chlamydophila pneumoniae PCR Not Detected     Mycoplasma pneumo by PCR Not Detected    Narrative:      In the setting of a positive respiratory panel with a viral infection PLUS a negative procalcitonin without other underlying concern for  "bacterial infection, consider observing off antibiotics or discontinuation of antibiotics and continue supportive care. If the respiratory panel is positive for atypical bacterial infection (Bordetella pertussis, Chlamydophila pneumoniae, or Mycoplasma pneumoniae), consider antibiotic de-escalation to target atypical bacterial infection.          No results found for: RESPCX    [Ht: 193 cm (75.98\"); Wt: (!) 146 kg (322 lb)]   Body mass index is 39.22 kg/m².  Ideal body weight: 86.8 kg (191 lb 4.1 oz)  Adjusted ideal body weight: 110 kg (243 lb 8.9 oz)      Assessment:  WBCs WNL, febrile, CRP elevated  Creatinine WNL  Cultures  7/16 blood culture pending  AUC and trough goals are 400-600 and 10-20 mcg/mL, respectively.     Plan:  Start Vancomycin 1750 mg IV q12hrs, based on previously hospital stay  Vancomycin peak and trough to be collected prior to the 5th dose on 7/18 @0245 and 7/18 @1130, respectively (Currently ordered). Consulted until 7/23.  Pharmacy will monitor renal function and adjust dose accordingly.    Thank you for this consult.     Estiven Cannon [facility]   07/16/23 14:23 CDT    "

## 2023-07-16 NOTE — ED NOTES
Patient presents to ED with c/o AMS , LKW 1030 last night before bed. Wife reports the patient woke up this morning with chills, weak and not talking normal. At this time patient the patient is AxO x4 just slow to answer. No pain.

## 2023-07-16 NOTE — H&P
[facility]  HISTORY AND PHYSICAL      Date of Admission: 7/16/2023  Primary Care Physician: Jamaal Bravo MD    Subjective     Chief Complaint: Fever, chills    History of Present Illness  63-year-old male with diabetes mellitus with chronic left foot ulcer, Charcot joint followed by podiatry with LLE wrapped in compression boot, recently hospitalized with sepsis, fever with Enterobacter bacteremia treated with Zosyn.  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    He presents back after waking up overnight with chills and subsequently noting fever, as well as some transient confusion noted by family at bedside.   During prior hospitalization no confirmed source of bacteremia, although most likely soft tissue source.  He has not had any recent respiratory symptoms, denies shortness of breath or cough.  No chest pain.  No dysuria.  No abdominal pain or diarrhea.  Has not noted any new rashes.  No new focal weakness or vision changes.    Initial work-up in the ED significant for fever, temperature up to 101.1, sinus tachycardia, lactic acidosis with lactate over 4, and elevated CRP of 11.      Review of Systems   Comprehensive ROS completed  Otherwise complete ROS reviewed and negative except as mentioned in the HPI.    Past Medical History:   Past Medical History:   Diagnosis Date    Arthritis     Atrial fibrillation     Diabetes mellitus     Diabetic foot ulcer associated with type 2 diabetes mellitus     left great toe    Hallux malleus of left foot     Hammer toe     Hypertension     MI (myocardial infarction)     Neuropathy in diabetes     Onychomycosis     Presence of biventricular cardiac pacemaker     Rheumatoid arthritis      Past Surgical History:  Past Surgical  History:   Procedure Laterality Date    AMPUTATION DIGIT Right 03/05/2017    Procedure: RIGHT AMPUTATION TRANSMETATRASAL , RECESSION GASTROCNEMOUS;  Surgeon: Marco A Thompson DPM;  Location: [facility] OR;  Service:     ANKLE FUSION Left 04/13/2023    Procedure: REDUCTION OF LEFT ANKLE DEFORMITY WITH APPLICATION OF EXTERNAL FIXATOR;  Surgeon: Marco A Thompson DPM;  Location: [facility] OR;  Service: Podiatry;  Laterality: Left;    CARDIAC CATHETERIZATION      CARDIAC DEFIBRILLATOR PLACEMENT  2010, 2016    CARPAL TUNNEL RELEASE  2015    elbow and wrist left arm    FOOT FUSION Left 05/15/2023    Procedure: REMOVAL OF EXTERNAL FIXATOR LEFT LOWER EXTREMITY WITH TIBIAL TALOCALCANEAL ARTHRODESIS  AND ALL INDICATED PROCEDURES;  Surgeon: Marco A Thompson DPM;  Location: [facility] OR;  Service: Podiatry;  Laterality: Left;    FOOT IRRIGATION, DEBRIDEMENT AND REPAIR Left 03/07/2018    Procedure: FOOT IRRIGATION, DEBRIDEMENT AND REPAIR;  Surgeon: Marco A Thompson DPM;  Location: [facility] OR;  Service:     FOOT IRRIGATION, DEBRIDEMENT AND REPAIR Left 03/10/2018    Procedure: FOOT IRRIGATION, DEBRIDEMENT AND REPAIR;  Surgeon: Marco A Thompson DPM;  Location: [facility] OR;  Service: Podiatry    FOOT WOUND CLOSURE Right 03/02/2017    Procedure: INCISION AND DRAINAGE, RIGHT FOOT;  Surgeon: Tung Rosenthal DPM;  Location: [facility] OR;  Service:     HAMMER TOE REPAIR Left 02/15/2018    Procedure: HAMMERTOE CORRECTION LEFT SECOND, THIRD AND FOURTH TOES AND HALLUX INTERPHALANGEAL JOINT ARTHRODESIS LEFT FOOT (Micro Asnis, 4.0 & 5.0 Asnis)      (c-arm);  Surgeon: Marco A Thompson DPM;  Location: [facility] OR;  Service:     HARDWARE REMOVAL Left 03/05/2018    Procedure: ANKLE/FOOT HARDWARE REMOVAL;  Surgeon: Marco A Thompson DPM;  Location: [facility] OR;  Service:     INCISION AND DRAINAGE LEG Left 03/05/2018    Procedure: INCISION AND DRAINAGE LOWER EXTREMITY;  Surgeon: Marco A Thompson DPM;  Location: [facility] OR;  Service:     PLANTAR FASCIA RELEASE Left  11/21/2019    Procedure: PLANTAR PLANING LEFT FOOT AND ALL OTHER INDICATED PROCEDURES;  Surgeon: Marco A Thompson DPM;  Location: [facility];  Service: Podiatry    TOE AMPUTATION Right 2016    TONSILECTOMY, ADENOIDECTOMY, BILATERAL MYRINGOTOMY AND TUBES      age 25     Social History:  reports that he has never smoked. He has never used smokeless tobacco. He reports current alcohol use. He reports that he does not use drugs.    Family History: family history includes Diabetes in his father; Heart disease in an other family member; Hypertension in an other family member; No Known Problems in his daughter, daughter, maternal grandfather, maternal grandmother, paternal grandfather, paternal grandmother, and son; Stroke in his father.       Allergies:  Allergies   Allergen Reactions    Heparin Other (See Comments)     Heparin induce thrombocytopenia     Januvia [Sitagliptin] Hives    Lipitor [Atorvastatin] Other (See Comments)     Muscle Cramps...    Shellfish Allergy Swelling and Other (See Comments)     Age 13 this occurred doesn't remember what he ate swelled lips and face    Sulfa Antibiotics Rash     Pt was age of 4 when he was told by his family he had a reaction, pt is unsure        Medications:  Prior to Admission medications    Medication Sig Start Date End Date Taking? Authorizing Provider   acetaminophen (TYLENOL) 325 MG tablet Take 2 tablets by mouth Every 6 (Six) Hours As Needed for Mild Pain. 6/8/23  Yes Bernadine Back MD   apixaban (ELIQUIS) 5 MG tablet tablet Take 1 tablet by mouth Every 12 (Twelve) Hours. Indications: DVT/PE (active thrombosis) 6/8/23  Yes Bernadine Back MD   ascorbic acid (VITAMIN C) 1000 MG tablet Take 1 tablet by mouth Every Night. 7/19/21  Yes ProviderHerve MD   Bacillus Coagulans-Inulin (Probiotic) 1-250 BILLION-MG capsule Take 1 capsule by mouth 2 (Two) Times a Day. 1/27/23  Yes Lito Rico APRN   Blood Glucose Monitoring Suppl (ONE TOUCH ULTRA 2)  "w/Device kit  10/18/21  Yes Herve Cr MD   Cholecalciferol 125 MCG (5000 UT) tablet Take 1 tablet by mouth Every Night. 7/19/21  Yes Herve Cr MD   dronedarone (MULTAQ) 400 MG tablet Take 1 tablet by mouth Every Night.   Yes Herve Cr MD   fexofenadine (ALLEGRA) 180 MG tablet Take 1 tablet by mouth Every Night. 7/19/21  Yes Herve Cr MD   fluticasone (FLONASE) 50 MCG/ACT nasal spray 2 sprays into the nostril(s) as directed by provider As Needed for Rhinitis.   Yes Herve Cr MD   furosemide (Lasix) 20 MG tablet Take 1 tablet by mouth Daily. 4/11/23  Yes Lito Rico APRN   glimepiride (AMARYL) 4 MG tablet Take 1 tablet by mouth Every Night. 7/19/21  Yes Herve Cr MD   magnesium oxide (MAG-OX) 400 MG tablet Take 1 tablet by mouth 2 (Two) Times a Day. 11/25/21  Yes Herve Cr MD   metFORMIN (GLUCOPHAGE) 1000 MG tablet Take 1 tablet by mouth 2 (Two) Times a Day With Meals. 7/19/21  Yes Herve Cr MD   metoprolol succinate XL (TOPROL-XL) 25 MG 24 hr tablet Take 1 tablet by mouth 2 (Two) Times a Day. 0.5 tablet   Yes Herve Cr MD   multivitamin (THERAGRAN) tablet tablet Take  by mouth Every Night.   Yes Herve Cr MD   ONE TOUCH ULTRA TEST test strip USE TO TEST BLOOD SUGAR THREE TIMES A DAY 8/20/16  Yes Herve Cr MD   Zinc 50 MG tablet Take 1 tablet by mouth Every Night.   Yes Herve Cr MD   levoFLOXacin (LEVAQUIN) 500 MG tablet Take 1 tablet by mouth Daily. 6/19/23   Marco A Thompson DPM   ondansetron (ZOFRAN) 4 MG tablet Take 1 tablet by mouth Every 6 (Six) Hours As Needed for Nausea or Vomiting. 6/8/23   Bernadine Back MD     I have utilized all available immediate resources to obtain, update, and review the patient's current medications.    Objective     Vital Signs: /60   Pulse 100   Temp (!) 101.1 °F (38.4 °C) (Rectal)   Resp 18   Ht 193 cm (75.98\")   " Wt (!) 146 kg (322 lb)   SpO2 94%   BMI 39.22 kg/m²   Physical Exam   General: Chronically ill-appearing, no acute distress  Cardiac: Tachycardic with regular rhythm  Respiratory      Results Reviewed:  Lab Results (last 24 hours)       Procedure Component Value Units Date/Time    Urinalysis, Microscopic Only - Straight Cath [[id number]]  (Abnormal) Collected: 07/16/23 1007    Specimen: Urine from Straight Cath Updated: 07/16/23 1053     RBC, UA 0-2 /HPF      WBC, UA 0-2 /HPF      Bacteria, UA None Seen /HPF      Squamous Epithelial Cells, UA 0-2 /HPF      Hyaline Casts, UA 0-2 /LPF      Methodology Manual Light Microscopy    Respiratory Panel PCR w/COVID-19(SARS-CoV-2) TRICIA/SABI/ALFA/PAD/COR/MAD/RUBI In-House, NP Swab in UTM/VTM, 3-4 HR TAT - Swab, Nasopharynx [[id number]] Collected: 07/16/23 1035    Specimen: Swab from Nasopharynx Updated: 07/16/23 1043    Fentanyl, Urine - Straight Cath [[id number]]  (Normal) Collected: 07/16/23 1007    Specimen: Urine from Straight Cath Updated: 07/16/23 1032     Fentanyl, Urine Negative    Narrative:      Negative Threshold:      Fentanyl 5 ng/mL     The normal value for the drug tested is negative. This report includes final unconfirmed screening results to be used for medical treatment purposes only. Unconfirmed results must not be used for non-medical purposes such as employment or legal testing. Clinical consideration should be applied to any drug of abuse test, particularly when unconfirmed results are used.           Urinalysis With Microscopic If Indicated (No Culture) - Straight Cath [[id number]]  (Abnormal) Collected: 07/16/23 1007    Specimen: Urine from Straight Cath Updated: 07/16/23 1028     Color, UA Yellow     Appearance, UA Clear     pH, UA <=5.0     Specific Gravity, UA 1.027     Glucose, UA Negative     Ketones, UA Trace     Bilirubin, UA Negative     Blood, UA Negative     Protein, UA 30 mg/dL (1+)     Leuk Esterase, UA Negative     Nitrite, UA Negative      Urobilinogen, UA 1.0 E.U./dL    Urine Drug Screen - Straight Cath [[id number]]  (Normal) Collected: 07/16/23 1007    Specimen: Urine from Straight Cath Updated: 07/16/23 1026     THC, Screen, Urine Negative     Phencyclidine (PCP), Urine Negative     Cocaine Screen, Urine Negative     Methamphetamine, Ur Negative     Opiate Screen Negative     Amphetamine Screen, Urine Negative     Benzodiazepine Screen, Urine Negative     Tricyclic Antidepressants Screen Negative     Methadone Screen, Urine Negative     Barbiturates Screen, Urine Negative     Oxycodone Screen, Urine Negative     Propoxyphene Screen Negative     Buprenorphine, Screen, Urine Negative    Narrative:      Cutoff For Drugs Screened:    Amphetamines               500 ng/ml  Barbiturates               200 ng/ml  Benzodiazepines            150 ng/ml  Cocaine                    150 ng/ml  Methadone                  200 ng/ml  Opiates                    100 ng/ml  Phencyclidine               25 ng/ml  THC                            50 ng/ml  Methamphetamine            500 ng/ml  Tricyclic Antidepressants  300 ng/ml  Oxycodone                  100 ng/ml  Propoxyphene               300 ng/ml  Buprenorphine               10 ng/ml    The normal value for all drugs tested is negative. This report includes unconfirmed screening results, with the cutoff values listed, to be used for medical treatment purposes only.  Unconfirmed results must not be used for non-medical purposes such as employment or legal testing.  Clinical consideration should be applied to any drug of abuse test, particularly when unconfirmed results are used.      [place] Draw [[id number]] Collected: 07/16/23 0913    Specimen: Blood Updated: 07/16/23 1017    Narrative:      The following orders were created for panel order [place] Draw.  Procedure                               Abnormality         Status                     ---------                               -----------         ------                      Green Top (Gel)[[id number]]                                  Final result               Lavender Top[[id number]]                                     Final result               Gold Top - SST[[id number]]                                   Final result               Light Blue Top[[id number]]                                   Final result                 Please view results for these tests on the individual orders.    Lavender Top [[id number]] Collected: 07/16/23 0913    Specimen: Blood Updated: 07/16/23 1017     Extra Tube hold for add-on     Comment: Auto resulted       Gold Top - SST [[id number]] Collected: 07/16/23 0913    Specimen: Blood Updated: 07/16/23 1017     Extra Tube Hold for add-ons.     Comment: Auto resulted.       Light Blue Top [[id number]] Collected: 07/16/23 0913    Specimen: Blood Updated: 07/16/23 1017     Extra Tube Hold for add-ons.     Comment: Auto resulted       Green Top (Gel) [[id number]] Collected: 07/16/23 0913    Specimen: Blood Updated: 07/16/23 1017     Extra Tube Hold for add-ons.     Comment: Auto resulted.       Blood Culture - Blood, Arm, Left [[id number]] Collected: 07/16/23 1001    Specimen: Blood from Arm, Left Updated: 07/16/23 1012    Blood Culture - Blood, Arm, Left [[id number]] Collected: 07/16/23 0952    Specimen: Blood from Arm, Left Updated: 07/16/23 1002    Comprehensive Metabolic Panel [[id number]]  (Abnormal) Collected: 07/16/23 0913    Specimen: Blood Updated: 07/16/23 0945     Glucose 242 mg/dL      BUN 28 mg/dL      Creatinine 1.16 mg/dL      Sodium 133 mmol/L      Potassium 4.1 mmol/L      Chloride 96 mmol/L      CO2 20.0 mmol/L      Calcium 8.5 mg/dL      Total Protein 7.2 g/dL      Albumin 3.8 g/dL      ALT (SGPT) 23 U/L      AST (SGOT) 17 U/L      Alkaline Phosphatase 106 U/L      Total Bilirubin 1.3 mg/dL      Globulin 3.4 gm/dL      A/G Ratio 1.1 g/dL      BUN/Creatinine Ratio 24.1     Anion Gap 17.0 mmol/L      eGFR 71.7 mL/min/1.73     Narrative:      GFR  Normal >60  Chronic Kidney Disease <60  Kidney Failure <15      Ethanol [[id number]] Collected: 07/16/23 0913    Specimen: Blood Updated: 07/16/23 0945     Ethanol <10 mg/dL      Ethanol % <0.010 %     Single High Sensitivity Troponin T [[id number]]  (Abnormal) Collected: 07/16/23 0913    Specimen: Blood Updated: 07/16/23 0944     HS Troponin T 30 ng/L     Narrative:      High Sensitive Troponin T Reference Range:  <10.0 ng/L- Negative Female for AMI  <15.0 ng/L- Negative Male for AMI  >=10 - Abnormal Female indicating possible myocardial injury.  >=15 - Abnormal Male indicating possible myocardial injury.   Clinicians would have to utilize clinical acumen, EKG, Troponin, and serial changes to determine if it is an Acute Myocardial Infarction or myocardial injury due to an underlying chronic condition.         Lactic Acid, Plasma [[id number]]  (Abnormal) Collected: 07/16/23 0913    Specimen: Blood Updated: 07/16/23 0943     Lactate 4.7 mmol/L     Protime-INR [[id number]]  (Abnormal) Collected: 07/16/23 0913    Specimen: Blood Updated: 07/16/23 0939     Protime 17.6 Seconds      INR 1.46    Narrative:      Therapeutic range for most indications is 2.0-3.0 INR,  or 2.5-3.5 for mechanical heart valves.    CBC & Differential [[id number]]  (Abnormal) Collected: 07/16/23 0913    Specimen: Blood Updated: 07/16/23 0920    Narrative:      The following orders were created for panel order CBC & Differential.  Procedure                               Abnormality         Status                     ---------                               -----------         ------                     CBC Auto Differential[[id number]]        Abnormal            Final result                 Please view results for these tests on the individual orders.    CBC Auto Differential [[id number]]  (Abnormal) Collected: 07/16/23 0913    Specimen: Blood Updated: 07/16/23 0920     WBC 5.43 10*3/mm3      RBC 4.00 10*6/mm3      Hemoglobin 11.3 g/dL      Hematocrit  33.9 %      MCV 84.8 fL      MCH 28.3 pg      MCHC 33.3 g/dL      RDW 16.9 %      RDW-SD 52.3 fl      MPV 10.8 fL      Platelets 112 10*3/mm3      Neutrophil % 87.8 %      Lymphocyte % 7.9 %      Monocyte % 3.5 %      Eosinophil % 0.0 %      Basophil % 0.2 %      Immature Grans % 0.6 %      Neutrophils, Absolute 4.77 10*3/mm3      Lymphocytes, Absolute 0.43 10*3/mm3      Monocytes, Absolute 0.19 10*3/mm3      Eosinophils, Absolute 0.00 10*3/mm3      Basophils, Absolute 0.01 10*3/mm3      Immature Grans, Absolute 0.03 10*3/mm3      nRBC 0.0 /100 WBC     POC Glucose Once [[id number]]  (Abnormal) Collected: 07/16/23 0908    Specimen: Blood Updated: 07/16/23 0919     Glucose 228 mg/dL      Comment: RN NotifiedNotify DoctorOperator: [id number] RAKAN HALEYMeter ID: [id number]             Imaging Results (Last 24 Hours)       Procedure Component Value Units Date/Time    CT Head Without Contrast [[id number]] Collected: 07/16/23 0940     Updated: 07/16/23 0945    Narrative:      CT HEAD WO CONTRAST    HISTORY: Confusion/delirium, altered LOC, unexplained    COMPARISON: CT 6/2/2023    Automated exposure control was also utilized to decrease patient radiation dose.    TECHNIQUE: Helical tomographic images of the brain were obtained without the use  of intravenous contrast.    FINDINGS:  Gray-white matter differentiation is maintained.    Normal appearance of midline structures.  No midline shift.  Symmetric  appearance of the hemispheres.  Basal cisterns are intact.  Fourth ventricle is  intact.  Lateral ventricles are unremarkable.      No evidence of intraparenchymal hemorrhage or abnormal extra-axial fluid  collections.  Sulci are normal in appearance.  Cerebellum is symmetric and  normal in appearance.  Brainstem is unremarkable.    Bones: No acute osseous abnormality.    Soft tissues: Unremarkable.    Sinuses and mastoid air cells: Unremarkable.      Impression:      1. No acute intracranial process.        XR Chest 1  View [[id number]] Collected: 07/16/23 0922     Updated: 07/16/23 0924    Narrative:      XR CHEST, 1 VIEW    HISTORY:  Chills.    FINDINGS:  No pneumothorax.  No pleural effusion.  No focal airspace opacities.  Implantable cardiac device with associated leads in similar position to prior.  The cardiomediastinal silhouette and upper abdomen are unremarkable.  No acute  osseous abnormality.            I have personally reviewed chart, history, vitals, labs, diagnostics.      Assessment / Plan     Assessment:   Active Hospital Problems    Diagnosis     **Sepsis      Plan:      Sepsis, infectious source most likely soft tissue infection, however if bacteremia is present would need to rule out endocarditis or infection of biventricular ICD  --Crystalloid resuscitation for sepsis, monitor hemodynamics  -- Blood cultures obtained  - No evidence of infection on UA  - Chest x-ray unremarkable  --Respiratory PCR negative  --Elevated CRP noted  - Given diabetic wound with history as osteomyelitis and recent hospitalization with Enterobacter bacteremia, will empirically treat with IV vancomycin and cefepime  -Wound care  - will consider podiatry consultation pending clinical course    Anion gap metabolic acidosis with lactic acidosis, likely related to sepsis  - Trend lactate    Chronic HFrEF, EF 30-35% on echo in June, AICD present  - Cautious with any further fluid resuscitation    A-fib  Monitor telemetry  Continue Multaq for rhythm control  Rate control on metoprolol twice daily as long as remains hemodynamically stable in setting of sepsis  Anticoagulated on Eliquis    History of DVT/PE  Anticoagulated on Eliquis      PT/OT      Multiple complex medical problems  Morbidity and mortality high risk  Medical decision making high complexity    Extensive discussion had with patient and family at bedside.  All questions sought and answered.    Electronically signed by Chente Ross MD, 07/16/23, 10:58 CDT.

## 2023-07-17 ENCOUNTER — APPOINTMENT (OUTPATIENT)
Dept: GENERAL RADIOLOGY | Facility: HOSPITAL | Age: 63
DRG: 872 | End: 2023-07-17
Payer: MEDICARE

## 2023-07-17 LAB
ANION GAP SERPL CALCULATED.3IONS-SCNC: 9 MMOL/L (ref 5–15)
ANISOCYTOSIS BLD QL: NORMAL
BASOPHILS # BLD MANUAL: 0.04 10*3/MM3 (ref 0–0.2)
BASOPHILS NFR BLD MANUAL: 1 % (ref 0–1.5)
BUN SERPL-MCNC: 17 MG/DL (ref 8–23)
BUN/CREAT SERPL: 18.3 (ref 7–25)
CALCIUM SPEC-SCNC: 8.2 MG/DL (ref 8.6–10.5)
CHLORIDE SERPL-SCNC: 103 MMOL/L (ref 98–107)
CO2 SERPL-SCNC: 24 MMOL/L (ref 22–29)
CREAT SERPL-MCNC: 0.93 MG/DL (ref 0.76–1.27)
D-LACTATE SERPL-SCNC: 1 MMOL/L (ref 0.5–2)
DEPRECATED RDW RBC AUTO: 54.4 FL (ref 37–54)
EGFRCR SERPLBLD CKD-EPI 2021: 93.4 ML/MIN/1.73
ERYTHROCYTE [DISTWIDTH] IN BLOOD BY AUTOMATED COUNT: 17.3 % (ref 12.3–15.4)
ERYTHROCYTE [SEDIMENTATION RATE] IN BLOOD: 23 MM/HR (ref 0–20)
GLUCOSE BLDC GLUCOMTR-MCNC: 147 MG/DL (ref 70–130)
GLUCOSE BLDC GLUCOMTR-MCNC: 164 MG/DL (ref 70–130)
GLUCOSE BLDC GLUCOMTR-MCNC: 184 MG/DL (ref 70–130)
GLUCOSE BLDC GLUCOMTR-MCNC: 195 MG/DL (ref 70–130)
GLUCOSE SERPL-MCNC: 125 MG/DL (ref 65–99)
HCT VFR BLD AUTO: 31.5 % (ref 37.5–51)
HGB BLD-MCNC: 10.4 G/DL (ref 13–17.7)
LYMPHOCYTES # BLD MANUAL: 0.99 10*3/MM3 (ref 0.7–3.1)
LYMPHOCYTES NFR BLD MANUAL: 5 % (ref 5–12)
MCH RBC QN AUTO: 28.3 PG (ref 26.6–33)
MCHC RBC AUTO-ENTMCNC: 33 G/DL (ref 31.5–35.7)
MCV RBC AUTO: 85.6 FL (ref 79–97)
MONOCYTES # BLD: 0.22 10*3/MM3 (ref 0.1–0.9)
NEUTROPHILS # BLD AUTO: 3.23 10*3/MM3 (ref 1.7–7)
NEUTROPHILS NFR BLD MANUAL: 71 % (ref 42.7–76)
NEUTS BAND NFR BLD MANUAL: 1 % (ref 0–5)
PLATELET # BLD AUTO: 91 10*3/MM3 (ref 140–450)
PMV BLD AUTO: 11 FL (ref 6–12)
POTASSIUM SERPL-SCNC: 3.8 MMOL/L (ref 3.5–5.2)
RBC # BLD AUTO: 3.68 10*6/MM3 (ref 4.14–5.8)
SMALL PLATELETS BLD QL SMEAR: NORMAL
SODIUM SERPL-SCNC: 136 MMOL/L (ref 136–145)
VARIANT LYMPHS NFR BLD MANUAL: 22 % (ref 19.6–45.3)
WBC MORPH BLD: NORMAL
WBC NRBC COR # BLD: 4.49 10*3/MM3 (ref 3.4–10.8)

## 2023-07-17 PROCEDURE — 99231 SBSQ HOSP IP/OBS SF/LOW 25: CPT | Performed by: NURSE PRACTITIONER

## 2023-07-17 PROCEDURE — 83605 ASSAY OF LACTIC ACID: CPT | Performed by: FAMILY MEDICINE

## 2023-07-17 PROCEDURE — 97166 OT EVAL MOD COMPLEX 45 MIN: CPT

## 2023-07-17 PROCEDURE — 73630 X-RAY EXAM OF FOOT: CPT

## 2023-07-17 PROCEDURE — 99221 1ST HOSP IP/OBS SF/LOW 40: CPT | Performed by: NURSE PRACTITIONER

## 2023-07-17 PROCEDURE — 82948 REAGENT STRIP/BLOOD GLUCOSE: CPT

## 2023-07-17 PROCEDURE — 80048 BASIC METABOLIC PNL TOTAL CA: CPT | Performed by: STUDENT IN AN ORGANIZED HEALTH CARE EDUCATION/TRAINING PROGRAM

## 2023-07-17 PROCEDURE — 97162 PT EVAL MOD COMPLEX 30 MIN: CPT

## 2023-07-17 PROCEDURE — 25010000002 VANCOMYCIN 10 G RECONSTITUTED SOLUTION: Performed by: STUDENT IN AN ORGANIZED HEALTH CARE EDUCATION/TRAINING PROGRAM

## 2023-07-17 PROCEDURE — 85652 RBC SED RATE AUTOMATED: CPT | Performed by: NURSE PRACTITIONER

## 2023-07-17 PROCEDURE — 85025 COMPLETE CBC W/AUTO DIFF WBC: CPT | Performed by: STUDENT IN AN ORGANIZED HEALTH CARE EDUCATION/TRAINING PROGRAM

## 2023-07-17 PROCEDURE — 85007 BL SMEAR W/DIFF WBC COUNT: CPT | Performed by: STUDENT IN AN ORGANIZED HEALTH CARE EDUCATION/TRAINING PROGRAM

## 2023-07-17 PROCEDURE — 73610 X-RAY EXAM OF ANKLE: CPT

## 2023-07-17 PROCEDURE — 0 CEFEPIME PER 500 MG: Performed by: STUDENT IN AN ORGANIZED HEALTH CARE EDUCATION/TRAINING PROGRAM

## 2023-07-17 PROCEDURE — 25010000002 ONDANSETRON PER 1 MG: Performed by: STUDENT IN AN ORGANIZED HEALTH CARE EDUCATION/TRAINING PROGRAM

## 2023-07-17 RX ORDER — AMOXICILLIN 250 MG
2 CAPSULE ORAL 2 TIMES DAILY
Status: DISCONTINUED | OUTPATIENT
Start: 2023-07-17 | End: 2023-07-22 | Stop reason: HOSPADM

## 2023-07-17 RX ORDER — LIDOCAINE 50 MG/G
1 PATCH TOPICAL
Status: DISCONTINUED | OUTPATIENT
Start: 2023-07-17 | End: 2023-07-22 | Stop reason: HOSPADM

## 2023-07-17 RX ORDER — SODIUM CHLORIDE 9 MG/ML
15 INJECTION, SOLUTION INTRAVENOUS CONTINUOUS
Status: DISCONTINUED | OUTPATIENT
Start: 2023-07-17 | End: 2023-07-17

## 2023-07-17 RX ORDER — ACETAMINOPHEN 325 MG/1
650 TABLET ORAL EVERY 6 HOURS PRN
Status: DISCONTINUED | OUTPATIENT
Start: 2023-07-17 | End: 2023-07-18

## 2023-07-17 RX ADMIN — LIDOCAINE 1 PATCH: 50 PATCH TOPICAL at 11:55

## 2023-07-17 RX ADMIN — ACETAMINOPHEN 650 MG: 325 TABLET, FILM COATED ORAL at 07:31

## 2023-07-17 RX ADMIN — Medication 400 MG: at 11:03

## 2023-07-17 RX ADMIN — Medication 10 ML: at 21:04

## 2023-07-17 RX ADMIN — CEFEPIME 2000 MG: 2 INJECTION, POWDER, FOR SOLUTION INTRAVENOUS at 21:05

## 2023-07-17 RX ADMIN — CEFEPIME HYDROCHLORIDE 2000 MG: 2 INJECTION, POWDER, FOR SOLUTION INTRAVENOUS at 06:35

## 2023-07-17 RX ADMIN — VANCOMYCIN HYDROCHLORIDE 1750 MG: 10 INJECTION, POWDER, LYOPHILIZED, FOR SOLUTION INTRAVENOUS at 00:54

## 2023-07-17 RX ADMIN — THERA TABS 1 TABLET: TAB at 21:05

## 2023-07-17 RX ADMIN — ACETAMINOPHEN 650 MG: 325 TABLET, FILM COATED ORAL at 19:15

## 2023-07-17 RX ADMIN — APIXABAN 5 MG: 5 TABLET, FILM COATED ORAL at 21:05

## 2023-07-17 RX ADMIN — CEFEPIME 2000 MG: 2 INJECTION, POWDER, FOR SOLUTION INTRAVENOUS at 15:09

## 2023-07-17 RX ADMIN — Medication 5000 UNITS: at 21:04

## 2023-07-17 RX ADMIN — SODIUM CHLORIDE 40 ML/HR: 9 INJECTION, SOLUTION INTRAVENOUS at 01:49

## 2023-07-17 RX ADMIN — DRONEDARONE 400 MG: 400 TABLET, FILM COATED ORAL at 21:05

## 2023-07-17 RX ADMIN — APIXABAN 5 MG: 5 TABLET, FILM COATED ORAL at 11:03

## 2023-07-17 RX ADMIN — ACETAMINOPHEN 650 MG: 325 TABLET, FILM COATED ORAL at 13:15

## 2023-07-17 RX ADMIN — METOPROLOL SUCCINATE 25 MG: 25 TABLET, FILM COATED, EXTENDED RELEASE ORAL at 11:03

## 2023-07-17 RX ADMIN — VANCOMYCIN HYDROCHLORIDE 1750 MG: 10 INJECTION, POWDER, LYOPHILIZED, FOR SOLUTION INTRAVENOUS at 13:15

## 2023-07-17 RX ADMIN — ONDANSETRON 4 MG: 2 INJECTION INTRAMUSCULAR; INTRAVENOUS at 08:01

## 2023-07-17 RX ADMIN — OXYCODONE HYDROCHLORIDE AND ACETAMINOPHEN 1000 MG: 500 TABLET ORAL at 21:05

## 2023-07-17 RX ADMIN — CETIRIZINE HYDROCHLORIDE 10 MG: 10 TABLET, FILM COATED ORAL at 21:05

## 2023-07-17 RX ADMIN — Medication 400 MG: at 21:05

## 2023-07-17 RX ADMIN — Medication 10 ML: at 11:04

## 2023-07-17 RX ADMIN — METOPROLOL SUCCINATE 25 MG: 25 TABLET, FILM COATED, EXTENDED RELEASE ORAL at 21:05

## 2023-07-17 NOTE — PROGRESS NOTES
"Pharmacokinetics by Pharmacy - Vancomycin    Emre Lisa is a 63 y.o. male receiving vancomycin 1750mg IV Q12H day 2 for sepsis  Patient is also receiving cefepime    Objective:  [Ht: 193 cm (75.98\"); Wt: (!) 146 kg (322 lb)]     WBC   Date Value Ref Range Status   07/17/2023 4.49 3.40 - 10.80 10*3/mm3 Final   07/16/2023 5.43 3.40 - 10.80 10*3/mm3 Final   06/28/2023 6.11 3.40 - 10.80 10*3/mm3 Final      C-Reactive Protein   Date Value Ref Range Status   07/16/2023 11.00 (H) 0.00 - 0.50 mg/dL Final   06/29/2023 0.44 0.00 - 0.50 mg/dL Final   06/05/2023 8.78 (H) 0.00 - 0.50 mg/dL Final     Lactate   Date Value Ref Range Status   07/17/2023 1.0 0.5 - 2.0 mmol/L Final   07/16/2023 2.3 (C) 0.5 - 2.0 mmol/L Final   07/16/2023 2.5 (C) 0.5 - 2.0 mmol/L Final      Temp Readings from Last 1 Encounters:   07/17/23 99 °F (37.2 °C) (Temporal)     Estimated Creatinine Clearance: 129.8 mL/min (by C-G formula based on SCr of 0.93 mg/dL).   Creatinine   Date Value Ref Range Status   07/17/2023 0.93 0.76 - 1.27 mg/dL Final   07/16/2023 1.16 0.76 - 1.27 mg/dL Final   06/28/2023 0.94 0.76 - 1.27 mg/dL Final   11/08/2022 1.0 0.7 - 1.3 mg/dL Final       Vancomycin Peak   Date Value Ref Range Status   05/17/2023 28.90 20.00 - 40.00 mcg/mL Final   05/09/2023 32.90 20.00 - 40.00 mcg/mL Final     Vancomycin Trough   Date Value Ref Range Status   05/18/2023 15.60 5.00 - 20.00 mcg/mL Final   05/10/2023 15.70 5.00 - 20.00 mcg/mL Final     Vancomycin Random   Date Value Ref Range Status   04/28/2023 38.90 5.00 - 40.00 mcg/mL Final       Culture Results:  Microbiology Results (last 10 days)       Procedure Component Value - Date/Time    Respiratory Panel PCR w/COVID-19(SARS-CoV-2) TRICIA/SABI/ALFA/PAD/COR/MAD/RUBI In-House, NP Swab in UTM/VTM, 3-4 HR TAT - Swab, Nasopharynx [[id number]]  (Normal) Collected: 07/16/23 1035    Lab Status: Final result Specimen: Swab from Nasopharynx Updated: 07/16/23 1135     ADENOVIRUS, PCR Not Detected     " Coronavirus 229E Not Detected     Coronavirus HKU1 Not Detected     Coronavirus NL63 Not Detected     Coronavirus OC43 Not Detected     COVID19 Not Detected     Human Metapneumovirus Not Detected     Human Rhinovirus/Enterovirus Not Detected     Influenza A PCR Not Detected     Influenza B PCR Not Detected     Parainfluenza Virus 1 Not Detected     Parainfluenza Virus 2 Not Detected     Parainfluenza Virus 3 Not Detected     Parainfluenza Virus 4 Not Detected     RSV, PCR Not Detected     Bordetella pertussis pcr Not Detected     Bordetella parapertussis PCR Not Detected     Chlamydophila pneumoniae PCR Not Detected     Mycoplasma pneumo by PCR Not Detected    Narrative:      In the setting of a positive respiratory panel with a viral infection PLUS a negative procalcitonin without other underlying concern for bacterial infection, consider observing off antibiotics or discontinuation of antibiotics and continue supportive care. If the respiratory panel is positive for atypical bacterial infection (Bordetella pertussis, Chlamydophila pneumoniae, or Mycoplasma pneumoniae), consider antibiotic de-escalation to target atypical bacterial infection.    Blood Culture - Blood, Arm, Left [[id number]]  (Normal) Collected: 07/16/23 1001    Lab Status: Preliminary result Specimen: Blood from Arm, Left Updated: 07/17/23 1015     Blood Culture No growth at 24 hours    Blood Culture - Blood, Arm, Left [[id number]]  (Normal) Collected: 07/16/23 0952    Lab Status: Preliminary result Specimen: Blood from Arm, Left Updated: 07/17/23 1015     Blood Culture No growth at 24 hours          No results found for: RESPCX      Assessment:  WBC WNL, Tmax today - 101.3  Blood cx pending NGTD - previous admission Enterobacter bacteremia  SCr improving  BP improving  Goal -600    Plan:  1. Continue vancomycin 1750mg IV Q12H  2.  Vancomycin levels to be collected tomorrow  3. Pharmacy will monitor renal function and adjust dose  accordingly.      Raphael Linares, PharmD   07/17/23 16:14 CDT

## 2023-07-17 NOTE — THERAPY EVALUATION
Patient Name: Emre Lisa  : 1960    MRN: [id number]                              Today's Date: 2023       Admit Date: 2023    Visit Dx:     ICD-10-CM ICD-9-CM   1. Fever of unknown origin  R50.9 780.60   2. Sepsis due to other etiology  A41.89 038.8   3. Impaired functional mobility, balance, gait, and endurance [Z74.09 (ICD-10-CM)]  Z74.09 V49.89     Patient Active Problem List   Diagnosis    Foot osteomyelitis, right    Nodule of groin    Type 2 diabetes mellitus with skin complication    Status post transmetatarsal amputation of right foot    Hammer toe of left foot    Hallux malleus of left foot    Cellulitis of left foot    Infected hardware in left leg    Chronic multifocal osteomyelitis of left foot    Charcot's joint of left foot    Skin ulcer of left foot, limited to breakdown of skin    Dyspnea    Syncope    Acute respiratory failure with hypoxia    Tachycardia    Neuropathy due to secondary diabetes mellitus    Atrial fibrillation    Presence of biventricular cardiac pacemaker    Hypertension    Medically complex patient    Fever of unknown origin    Bacteremia    Primary osteoarthritis of knees, bilateral    Sepsis    Fever, unknown origin     Past Medical History:   Diagnosis Date    Arthritis     Atrial fibrillation     Diabetes mellitus     Diabetic foot ulcer associated with type 2 diabetes mellitus     left great toe    Hallux malleus of left foot     Hammer toe     Hypertension     MI (myocardial infarction)     Neuropathy in diabetes     Onychomycosis     Presence of biventricular cardiac pacemaker     Rheumatoid arthritis      Past Surgical History:   Procedure Laterality Date    AMPUTATION DIGIT Right 2017    Procedure: RIGHT AMPUTATION TRANSMETATRASAL , RECESSION GASTROCNEMOUS;  Surgeon: Marco A Thompson DPM;  Location: [facility];  Service:     ANKLE FUSION Left 2023    Procedure: REDUCTION OF LEFT ANKLE DEFORMITY WITH APPLICATION OF EXTERNAL FIXATOR;   Surgeon: Marco A Thompson DPM;  Location: [facility] OR;  Service: Podiatry;  Laterality: Left;    CARDIAC CATHETERIZATION      CARDIAC DEFIBRILLATOR PLACEMENT  2010, 2016    CARPAL TUNNEL RELEASE  2015    elbow and wrist left arm    FOOT FUSION Left 05/15/2023    Procedure: REMOVAL OF EXTERNAL FIXATOR LEFT LOWER EXTREMITY WITH TIBIAL TALOCALCANEAL ARTHRODESIS  AND ALL INDICATED PROCEDURES;  Surgeon: Marco A Thompson DPM;  Location: [facility] OR;  Service: Podiatry;  Laterality: Left;    FOOT IRRIGATION, DEBRIDEMENT AND REPAIR Left 03/07/2018    Procedure: FOOT IRRIGATION, DEBRIDEMENT AND REPAIR;  Surgeon: Marco A Thompson DPM;  Location: [facility] OR;  Service:     FOOT IRRIGATION, DEBRIDEMENT AND REPAIR Left 03/10/2018    Procedure: FOOT IRRIGATION, DEBRIDEMENT AND REPAIR;  Surgeon: Marco A Thompson DPM;  Location: [facility] OR;  Service: Podiatry    FOOT WOUND CLOSURE Right 03/02/2017    Procedure: INCISION AND DRAINAGE, RIGHT FOOT;  Surgeon: Tung Rosenthal DPM;  Location: [facility] OR;  Service:     HAMMER TOE REPAIR Left 02/15/2018    Procedure: HAMMERTOE CORRECTION LEFT SECOND, THIRD AND FOURTH TOES AND HALLUX INTERPHALANGEAL JOINT ARTHRODESIS LEFT FOOT (Micro Asnis, 4.0 & 5.0 Asnis)      (c-arm);  Surgeon: Marco A Thompson DPM;  Location: [facility] OR;  Service:     HARDWARE REMOVAL Left 03/05/2018    Procedure: ANKLE/FOOT HARDWARE REMOVAL;  Surgeon: Marco A Thompson DPM;  Location: [facility] OR;  Service:     INCISION AND DRAINAGE LEG Left 03/05/2018    Procedure: INCISION AND DRAINAGE LOWER EXTREMITY;  Surgeon: Marco A Thompson DPM;  Location: [facility] OR;  Service:     PLANTAR FASCIA RELEASE Left 11/21/2019    Procedure: PLANTAR PLANING LEFT FOOT AND ALL OTHER INDICATED PROCEDURES;  Surgeon: Marco A Thompson DPM;  Location: [facility] OR;  Service: Podiatry    TOE AMPUTATION Right 2016    TONSILECTOMY, ADENOIDECTOMY, BILATERAL MYRINGOTOMY AND TUBES      age 25      General Information       Row Name 07/17/23 0922          Physical  Therapy Time and Intention    Document Type evaluation  -MM     Mode of Treatment physical therapy;occupational therapy  -MM       Row Name 07/17/23 0905          General Information    Patient Profile Reviewed yes  -MM     Prior Level of Function independent:  -MM     Existing Precautions/Restrictions fall   -MM     Barriers to Rehab medically complex  -MM       Row Name 07/17/23 0905          Living Environment    People in Home significant other  -MM       Row Name 07/17/23 0905          Home Main Entrance    Number of Stairs, Main Entrance four  -MM     Stair Railings, Main Entrance none  -MM       Row Name 07/17/23 0905          Stairs Within Home, Primary    Stairs, Within Home, Primary Patient previously ambulated with SPC. Patient has FWW available. Patient has been sponge bathing. Has a walk-in shower, but patient reports it is small and shower chair will not fit. Patient has tub. Elevated toilet seat. Wife is able to help as needed.  -MM     Number of Stairs, Within Home, Primary one;other (see comments)  One step to get into bedroom.  -MM     Stair Railings, Within Home, Primary none  -MM       Row Name 07/17/23 0905          Cognition    Orientation Status (Cognition) oriented x 4  -MM       Row Name 07/17/23 0905          Safety Issues, Functional Mobility    Impairments Affecting Function (Mobility) balance;endurance/activity tolerance;range of motion (ROM);shortness of breath;strength  -MM               User Key  (r) = Recorded By, (t) = Taken By, (c) = Cosigned By      Initials Name Provider Type    MM Joelle Doan, PT Physical Therapist                   Mobility       Row Name 07/17/23 0905          Bed Mobility    Bed Mobility supine-sit;sit-supine  -MM     Supine-Sit Bivins (Bed Mobility) moderate assist (50% patient effort);2 person assist  -MM     Sit-Supine Bivins (Bed Mobility) supervision  -MM     Assistive Device (Bed Mobility) bed rails;head of bed elevated  -MM        Row Name 07/17/23 0905          Sit-Stand Transfer    Sit-Stand [place] (Transfers) minimum assist (75% patient effort)  -MM     Assistive Device (Sit-Stand Transfers) walker, front-wheeled  -MM       Row Name 07/17/23 0905          Gait/Stairs (Locomotion)    [place] Level (Gait) minimum assist (75% patient effort)  -MM     Assistive Device (Gait) walker, front-wheeled  -MM     Distance in Feet (Gait) 2 steps L, 1 step X 2.  -MM     Deviations/Abnormal Patterns (Gait) gait speed decreased;base of support, wide  -MM     Comment, (Gait/Stairs) Patient is able to ambulate 2 steps to the left to reposition in bed as well as 1 step X 2 with FWW and Min A X1. Patient has wide RYLAN, decreased gait speed and decreased endurance. Patient declined further ambulation d/t not feeling well.  -MM               User Key  (r) = Recorded By, (t) = Taken By, (c) = Cosigned By      Initials Name Provider Type    Joelle Soler PT Physical Therapist                   Obj/Interventions       Row Name 07/17/23 0905          Range of Motion Comprehensive    General Range of Motion other (see comments)  -MM     Comment, General Range of Motion All L ankle A/PROM decreased d/t Charcot, R ankle AROM DF/PF decreased. All other BLE ROM WFL.  -MM       Row Name 07/17/23 0905          Strength Comprehensive (MMT)    General Manual Muscle Testing (MMT) Assessment other (see comments)  -MM       Row Name 07/17/23 0905          Sensory Assessment (Somatosensory)    Sensory Assessment (Somatosensory) bilateral LE  -MM     Bilateral LE Sensory Assessment light touch awareness;impaired  LE sensation diminished below knee.  -MM               User Key  (r) = Recorded By, (t) = Taken By, (c) = Cosigned By      Initials Name Provider Type    Joelle Soler PT Physical Therapist                   Goals/Plan       Row Name 07/17/23 0905          Bed Mobility Goal 1 (PT)    Activity/Assistive Device (Bed Mobility Goal 1, PT) sit  to supine/supine to sit  -MM     [place] Level/Cues Needed (Bed Mobility Goal 1, PT) independent  -MM     Time Frame (Bed Mobility Goal 1, PT) 30 days  -MM     Progress/Outcomes (Bed Mobility Goal 1, PT) goal not met  -MM       Row Name 07/17/23 0905          Transfer Goal 1 (PT)    Activity/Assistive Device (Transfer Goal 1, PT) bed-to-chair/chair-to-bed;sit-to-stand/stand-to-sit;walker, rolling  -MM     [place] Level/Cues Needed (Transfer Goal 1, PT) modified independence  -MM     Time Frame (Transfer Goal 1, PT) by discharge  -MM     Progress/Outcome (Transfer Goal 1, PT) goal not met  -MM       Row Name 07/17/23 0905          Gait Training Goal 1 (PT)    Activity/Assistive Device (Gait Training Goal 1, PT) walker, rolling  -MM     [place] Level (Gait Training Goal 1, PT) modified independence  -MM     Distance (Gait Training Goal 1, PT) 25' X 2  -MM     Time Frame (Gait Training Goal 1, PT) by discharge  -MM     Progress/Outcome (Gait Training Goal 1, PT) goal not met  -MM       Row Name 07/17/23 0905          Stairs Goal 1 (PT)    Activity/Assistive Device (Stairs Goal 1, PT) ascending stairs;descending stairs  -MM     [place] Level/Cues Needed (Stairs Goal 1, PT) independent  -MM     Number of Stairs (Stairs Goal 1, PT) 4  -MM     Time Frame (Stairs Goal 1, PT) by discharge  -MM     Progress/Outcome (Stairs Goal 1, PT) goal not met  -MM       Row Name 07/17/23 0905          Therapy Assessment/Plan (PT)    Planned Therapy Interventions (PT) balance training;bed mobility training;gait training;home exercise program;strengthening;manual therapy techniques;neuromuscular re-education;patient/family education;stretching;stair training;ROM (range of motion);transfer training  -MM               User Key  (r) = Recorded By, (t) = Taken By, (c) = Cosigned By      Initials Name Provider Type    Joelle Soler, PT Physical Therapist                   Clinical Impression       Row Name 07/17/23  0905          Pain    Pretreatment Pain Rating 0/10 - no pain  -MM     Posttreatment Pain Rating 0/10 - no pain  -MM       Row Name 07/17/23 0905          Plan of Care Review    Plan of Care Reviewed With patient  -MM     Outcome Evaluation PT evaluation completed with OT. Patient AO X 4 and agreeable to therapy. Patient nasal cannula not in place when therapy entered. SpO2: 91%. Nasal cannula replaced. Patient was able to supine>sit with Mod A X 2. Patient able to sit>stand with Min A X 1. Patient able to ambulate 2 steps left to reposition and 1 step X 2 with FWW and Min A X1. Patient has increased RYLAN, decreased endurance and decreased gait speed with ambulation. Goals created, continue skilled IP PT. Recommend patient continue to ambulate with FWW to increase safety. Recommend patient d/c home with assist and HHPT.  -MM       Row Name 07/17/23 0905          Therapy Assessment/Plan (PT)    Rehab Potential (PT) fair, will monitor progress closely  -MM     Criteria for Skilled Interventions Met (PT) yes;skilled treatment is necessary  -MM     Therapy Frequency (PT) other (see comments)  5-7 days/week  -MM       Row Name 07/17/23 0905          Vital Signs    Pre Systolic BP Rehab 95  -MM     Pre Treatment Diastolic BP 62  -MM     Post Systolic BP Rehab 117  -MM     Post Treatment Diastolic BP 57  -MM     Pretreatment Heart Rate (beats/min) 107  -MM     Posttreatment Heart Rate (beats/min) 105  -MM     Pre SpO2 (%) 91  Patient nasal cannula was not properly place.  -MM     O2 Delivery Pre Treatment room air  -MM     Post SpO2 (%) 95  -MM     O2 Delivery Post Treatment nasal cannula  2 LPM  -MM     Pre Patient Position Supine  -MM     Post Patient Position Supine  -MM       Row Name 07/17/23 0905          Positioning and Restraints    Pre-Treatment Position in bed  -MM     Post Treatment Position bed  -MM     In Bed encouraged to call for assist;call light within reach;exit alarm on;LLE elevated  -MM                User Key  (r) = Recorded By, (t) = Taken By, (c) = Cosigned By      Initials Name Provider Type    Joelle Soler, PT Physical Therapist                   Outcome Measures       Row Name 07/17/23 0905 07/17/23 0800       How much help from another person do you currently need...    Turning from your back to your side while in flat bed without using bedrails? 3  -MM 4  -RE    Moving from lying on back to sitting on the side of a flat bed without bedrails? 3  -MM 4  -RE    Moving to and from a bed to a chair (including a wheelchair)? 3  -MM 4  -RE    Standing up from a chair using your arms (e.g., wheelchair, bedside chair)? 3  -MM 3  -RE    Climbing 3-5 steps with a railing? 2  -MM 2  -RE    To walk in hospital room? 2  -MM 3  -RE    AM-PAC 6 Clicks Score (PT) 16  -MM 20  -RE    Highest level of mobility 5 --> Static standing  -MM 6 --> Walked 10 steps or more  -RE              User Key  (r) = Recorded By, (t) = Taken By, (c) = Cosigned By      Initials Name Provider Type    RE Jenn Thacker RN Registered Nurse    Joelle Soler, PT Physical Therapist                                 Physical Therapy Education       Title: PT OT SLP Therapies (In Progress)       Topic: Physical Therapy (In Progress)       Point: Mobility training (In Progress)       Learning Progress Summary             Patient Acceptance, E, NR by  at 7/17/2023 1150    Comment: PT POC and goals. Proper hand positioning for transfers, proper use of FWW for safety.                         Point: Home exercise program (Not Started)       Learner Progress:  Not documented in this visit.              Point: Body mechanics (Not Started)       Learner Progress:  Not documented in this visit.              Point: Precautions (Not Started)       Learner Progress:  Not documented in this visit.                              User Key       Initials Effective Dates Name Provider Type Discipline     06/26/23 -  Joelle Doan PT  Physical Therapist PT                  PT Recommendation and Plan  Planned Therapy Interventions (PT): balance training, bed mobility training, gait training, home exercise program, strengthening, manual therapy techniques, neuromuscular re-education, patient/family education, stretching, stair training, ROM (range of motion), transfer training  Plan of Care Reviewed With: patient  Outcome Evaluation: PT evaluation completed with OT. Patient AO X 4 and agreeable to therapy. Patient nasal cannula not in place when therapy entered. SpO2: 91%. Nasal cannula replaced. Patient was able to supine>sit with Mod A X 2. Patient able to sit>stand with Min A X 1. Patient able to ambulate 2 steps left to reposition and 1 step X 2 with FWW and Min A X1. Patient has increased RYLAN, decreased endurance and decreased gait speed with ambulation. Goals created, continue skilled IP PT. Recommend patient continue to ambulate with FWW to increase safety. Recommend patient d/c home with assist and HHPT.     Time Calculation:    PT Charges       Row Name 07/17/23 0905             Time Calculation    Start Time 0905  -MM      Stop Time 1002  -MM      Time Calculation (min) 57 min  -MM      PT Received On 07/17/23  -MM      PT Goal Re-Cert Due Date 07/30/23  -MM         Untimed Charges    PT Eval/Re-eval Minutes 57  -MM         Total Minutes    Untimed Charges Total Minutes 57  -MM       Total Minutes 57  -MM                User Key  (r) = Recorded By, (t) = Taken By, (c) = Cosigned By      Initials Name Provider Type    MM Joelle Doan, PT Physical Therapist                  Therapy Charges for Today       Code Description Service Date Service Provider Modifiers Qty    [id number] HC PT EVAL MOD COMPLEXITY 4 7/17/2023 Joelle Doan, PT GP 1            PT G-Codes  AM-PAC 6 Clicks Score (PT): 16  PT Discharge Summary  Anticipated Discharge Disposition (PT): home with home health, home with assist    Joelle Doan  PT  7/17/2023

## 2023-07-17 NOTE — THERAPY EVALUATION
Acute Care - Occupational Therapy Initial Evaluation  [facility]     Patient Name: Emre Lisa  : 1960  MRN: [id number]  Today's Date: 2023     Date of Referral to OT: 23       Admit Date: 2023       ICD-10-CM ICD-9-CM   1. Fever of unknown origin  R50.9 780.60   2. Sepsis due to other etiology  A41.89 038.8   3. Impaired functional mobility, balance, gait, and endurance [Z74.09 (ICD-10-CM)]  Z74.09 V49.89   4. Impaired mobility and activities of daily living [Z74.09, Z78.9 (ICD-10-CM)]  Z74.09 V49.89    Z78.9      Patient Active Problem List   Diagnosis    Foot osteomyelitis, right    Nodule of groin    Type 2 diabetes mellitus with skin complication    Status post transmetatarsal amputation of right foot    Hammer toe of left foot    Hallux malleus of left foot    Cellulitis of left foot    Infected hardware in left leg    Chronic multifocal osteomyelitis of left foot    Charcot's joint of left foot    Skin ulcer of left foot, limited to breakdown of skin    Dyspnea    Syncope    Acute respiratory failure with hypoxia    Tachycardia    Neuropathy due to secondary diabetes mellitus    Atrial fibrillation    Presence of biventricular cardiac pacemaker    Hypertension    Medically complex patient    Fever of unknown origin    Bacteremia    Primary osteoarthritis of knees, bilateral    Sepsis    Fever, unknown origin     Past Medical History:   Diagnosis Date    Arthritis     Atrial fibrillation     Diabetes mellitus     Diabetic foot ulcer associated with type 2 diabetes mellitus     left great toe    Hallux malleus of left foot     Hammer toe     Hypertension     MI (myocardial infarction)     Neuropathy in diabetes     Onychomycosis     Presence of biventricular cardiac pacemaker     Rheumatoid arthritis      Past Surgical History:   Procedure Laterality Date    AMPUTATION DIGIT Right 2017    Procedure: RIGHT AMPUTATION TRANSMETATRASAL , RECESSION GASTROCNEMOUS;  Surgeon:  Marco A Thompson DPM;  Location: [facility] OR;  Service:     ANKLE FUSION Left 04/13/2023    Procedure: REDUCTION OF LEFT ANKLE DEFORMITY WITH APPLICATION OF EXTERNAL FIXATOR;  Surgeon: Marco A Thompson DPM;  Location: [facility] OR;  Service: Podiatry;  Laterality: Left;    CARDIAC CATHETERIZATION      CARDIAC DEFIBRILLATOR PLACEMENT  2010, 2016    CARPAL TUNNEL RELEASE  2015    elbow and wrist left arm    FOOT FUSION Left 05/15/2023    Procedure: REMOVAL OF EXTERNAL FIXATOR LEFT LOWER EXTREMITY WITH TIBIAL TALOCALCANEAL ARTHRODESIS  AND ALL INDICATED PROCEDURES;  Surgeon: Marco A Thompson DPM;  Location: [facility] OR;  Service: Podiatry;  Laterality: Left;    FOOT IRRIGATION, DEBRIDEMENT AND REPAIR Left 03/07/2018    Procedure: FOOT IRRIGATION, DEBRIDEMENT AND REPAIR;  Surgeon: Marco A Thompson DPM;  Location: [facility] OR;  Service:     FOOT IRRIGATION, DEBRIDEMENT AND REPAIR Left 03/10/2018    Procedure: FOOT IRRIGATION, DEBRIDEMENT AND REPAIR;  Surgeon: Marco A Thompson DPM;  Location: [facility] OR;  Service: Podiatry    FOOT WOUND CLOSURE Right 03/02/2017    Procedure: INCISION AND DRAINAGE, RIGHT FOOT;  Surgeon: Tung Rosenthal DPM;  Location: [facility] OR;  Service:     HAMMER TOE REPAIR Left 02/15/2018    Procedure: HAMMERTOE CORRECTION LEFT SECOND, THIRD AND FOURTH TOES AND HALLUX INTERPHALANGEAL JOINT ARTHRODESIS LEFT FOOT (Micro Asnis, 4.0 & 5.0 Asnis)      (c-arm);  Surgeon: Marco A Thompson DPM;  Location: [facility] OR;  Service:     HARDWARE REMOVAL Left 03/05/2018    Procedure: ANKLE/FOOT HARDWARE REMOVAL;  Surgeon: Marco A Thompson DPM;  Location: [facility] OR;  Service:     INCISION AND DRAINAGE LEG Left 03/05/2018    Procedure: INCISION AND DRAINAGE LOWER EXTREMITY;  Surgeon: Marco A Thompson DPM;  Location: [facility] OR;  Service:     PLANTAR FASCIA RELEASE Left 11/21/2019    Procedure: PLANTAR PLANING LEFT FOOT AND ALL OTHER INDICATED PROCEDURES;  Surgeon: Marco A Thompson DPM;  Location: [facility] OR;  Service: Podiatry    TOE  AMPUTATION Right 2016    TONSILECTOMY, ADENOIDECTOMY, BILATERAL MYRINGOTOMY AND TUBES      age 25         OT ASSESSMENT FLOWSHEET (last 12 hours)       OT Evaluation and Treatment       Row Name 07/17/23 0906                   OT Time and Intention    Subjective Information no complaints  -RB        Document Type evaluation  -RB        Mode of Treatment physical therapy;occupational therapy  -RB           General Information    Patient Profile Reviewed yes  -RB        Prior Level of Function independent:  -RB        Existing Precautions/Restrictions fall   -RB        Barriers to Rehab medically complex  -RB           Living Environment    People in Home spouse  -RB           Home Main Entrance    Number of Stairs, Main Entrance four  -RB        Stair Railings, Main Entrance none  -RB           Home Use of Assistive/Adaptive Equipment    Equipment Currently Used at Home cane, straight;other (see comments);walker, rolling;shower chair  raiset toilet seat.  -RB           Pain Assessment    Pretreatment Pain Rating 0/10 - no pain  -RB        Posttreatment Pain Rating 0/10 - no pain  -RB           Cognition    Orientation Status (Cognition) oriented x 4  -RB           Range of Motion Comprehensive    General Range of Motion bilateral upper extremity ROM WNL  -RB           Strength Comprehensive (MMT)    General Manual Muscle Testing (MMT) Assessment other (see comments)  -RB        Comment, General Manual Muscle Testing (MMT) Assessment B UE strength was 4to 4+/5 grossly.  -RB           Sensory Assessment (Somatosensory)    Sensory Assessment (Somatosensory) UE sensation intact  -RB           Activities of Daily Living    BADL Assessment/Intervention lower body dressing  -RB           Lower Body Dressing Assessment/Training    [place] Level (Lower Body Dressing) lower body dressing skills;doff;socks;contact guard assist  simulated  -RB        Position (Lower Body Dressing) edge of bed sitting  -RB           Bed  Mobility    Bed Mobility supine-sit;sit-supine  -RB        Supine-Sit [place] (Bed Mobility) moderate assist (50% patient effort);2 person assist  -RB        Sit-Supine [place] (Bed Mobility) supervision  -RB           Functional Mobility    Functional Mobility- Ind. Level minimum assist (75% patient effort)  -RB        Functional Mobility- Device walker, front-wheeled  -RB        Functional Mobility-Distance (Feet) 3  -RB        Functional Mobility- Safety Issues sequencing ability decreased;step length decreased;supplemental O2  -RB           Transfer Assessment/Treatment    Transfers sit-stand transfer  -RB           Sit-Stand Transfer    Sit-Stand [place] (Transfers) minimum assist (75% patient effort)  -RB        Assistive Device (Sit-Stand Transfers) walker, front-wheeled  -RB           Safety Issues, Functional Mobility    Impairments Affecting Function (Mobility) balance;endurance/activity tolerance;strength;shortness of breath;range of motion (ROM)  -RB           Wound 04/14/23 2157 Left medial gluteal    Wound - Properties Group Placement Date: 04/14/23  -ML Placement Time: 2157  -ML Present on Hospital Admission: Y  -ML, it was passed on in report pt came in with wound, pt stated wound was present on admission  Side: Left  -ML Orientation: medial  -ML Location: gluteal  -ML    Retired Wound - Properties Group Placement Date: 04/14/23  -ML Placement Time: 2157  -ML Present on Hospital Admission: Y  -ML, it was passed on in report pt came in with wound, pt stated wound was present on admission  Side: Left  -ML Orientation: medial  -ML Location: gluteal  -ML    Retired Wound - Properties Group Date first assessed: 04/14/23  -ML Time first assessed: 2157  -ML Present on Hospital Admission: Y  -ML, it was passed on in report pt came in with wound, pt stated wound was present on admission  Side: Left  -ML Location: gluteal  -ML       Wound 05/15/23 1546 Left anterior foot Incision    Wound -  Properties Group Placement Date: 05/15/23  -HP Placement Time: 1546  -HP Present on Hospital Admission: Y  -HP Side: Left  -HP Orientation: anterior  -HP Location: foot  -HP Primary Wound Type: Incision  -HP Additional Comments: adaptic, bacitracin ointment, 4x4, abd, webril, ace, boot  -HP    Retired Wound - Properties Group Placement Date: 05/15/23  -HP Placement Time: 1546  -HP Present on Hospital Admission: Y  -HP Side: Left  -HP Orientation: anterior  -HP Location: foot  -HP Primary Wound Type: Incision  -HP Additional Comments: adaptic, bacitracin ointment, 4x4, abd, webril, ace, boot  -HP    Retired Wound - Properties Group Date first assessed: 05/15/23  -HP Time first assessed: 1546  -HP Present on Hospital Admission: Y  -HP Side: Left  -HP Location: foot  -HP Primary Wound Type: Incision  -HP Additional Comments: adaptic, bacitracin ointment, 4x4, abd, webril, ace, boot  -HP       Wound 06/03/23 0920 Left lower leg    Wound - Properties Group Placement Date: 06/03/23  -LW Placement Time: 0920  -LW Side: Left  -LW Orientation: lower  -LW Location: leg  -LW    Retired Wound - Properties Group Placement Date: 06/03/23  -LW Placement Time: 0920  -LW Side: Left  -LW Orientation: lower  -LW Location: leg  -LW    Retired Wound - Properties Group Date first assessed: 06/03/23  -LW Time first assessed: 0920  -LW Side: Left  -LW Location: leg  -LW       Wound 06/03/23 0922 Left posterior heel    Wound - Properties Group Placement Date: 06/03/23  -LW Placement Time: 0922 -LW Side: Left  -LW Orientation: posterior  -LW Location: heel  -LW    Retired Wound - Properties Group Placement Date: 06/03/23  -LW Placement Time: 0922  -LW Side: Left  -LW Orientation: posterior  -LW Location: heel  -LW    Retired Wound - Properties Group Date first assessed: 06/03/23  -LW Time first assessed: 0922  -LW Side: Left  -LW Location: heel  -LW       Wound 06/03/23 0923 Left anterior ankle    Wound - Properties Group Placement Date:  06/03/23  -LW Placement Time: 0923 -LW Side: Left  -LW Orientation: anterior  -LW Location: ankle  -LW    Retired Wound - Properties Group Placement Date: 06/03/23  -LW Placement Time: 0923 -LW Side: Left  -LW Orientation: anterior  -LW Location: ankle  -LW    Retired Wound - Properties Group Date first assessed: 06/03/23  -LW Time first assessed: 0923 -LW Side: Left  -LW Location: ankle  -LW       Vital Signs    Pre Systolic BP Rehab 95  -RB        Pre Treatment Diastolic BP 62  -RB        Post Systolic BP Rehab 117  -RB        Post Treatment Diastolic BP 57  -RB        Pretreatment Heart Rate (beats/min) 107  -RB        Posttreatment Heart Rate (beats/min) 105  -RB        Pre SpO2 (%) 91  -RB        O2 Delivery Pre Treatment supplemental O2  -RB        Post SpO2 (%) 95  -RB        O2 Delivery Post Treatment supplemental O2  -RB        Pre Patient Position Supine  -RB        Intra Patient Position Standing  -RB        Post Patient Position Supine  -RB           Positioning and Restraints    Pre-Treatment Position in bed  -RB        Post Treatment Position bed  -RB        In Bed call light within reach;encouraged to call for assist;exit alarm on  -RB           Therapy Assessment/Plan (OT)    Date of Referral to OT 07/16/23  -RB        Functional Level at Time of Evaluation (OT) Imp mob and ADLs.  -RB        OT Diagnosis Imp mob and ADLs.  -RB        Rehab Potential (OT) good, to achieve stated therapy goals  -RB        Criteria for Skilled Therapeutic Interventions Met (OT) yes;meets criteria  -RB        Therapy Frequency (OT) other (see comments)  5-7 days/wk  -RB        Predicted Duration of Therapy Intervention (OT) Until D/C or goals met.  -RB        Problem List (OT) problems related to;balance;coordination;mobility;inability to direct their own care;strength  -RB        Activity Limitations Related to Problem List (OT) unable to ambulate safely;unable to transfer safely;BADLs not performed adequately or  safely;IADLs not performed adequately or safely  -RB        Planned Therapy Interventions (OT) activity tolerance training;adaptive equipment training;BADL retraining;functional balance retraining;occupation/activity based interventions;patient/caregiver education/training;ROM/therapeutic exercise;strengthening exercise;transfer/mobility retraining  -RB           Evaluation Complexity (OT)    Review Occupational Profile/Medical/Therapy History Complexity expanded/moderate complexity  -RB        Assessment, Occupational Performance/Identification of Deficit Complexity 3-5 performance deficits  -RB        Clinical Decision Making Complexity (OT) detailed assessment/moderate complexity  -RB        Overall Complexity of Evaluation (OT) moderate complexity  -RB           Therapy Plan Review/Discharge Plan (OT)    Anticipated Discharge Disposition (OT) home with assist;home with 24/7 care;home with home health  -RB           OT Goals    Transfer Goal Selection (OT) transfer, OT goal 1  -RB        Bathing Goal Selection (OT) bathing, OT goal 1  -RB        Dressing Goal Selection (OT) dressing, OT goal 1  -RB        Toileting Goal Selection (OT) toileting, OT goal 1  -RB           Transfer Goal 1 (OT)    Activity/Assistive Device (Transfer Goal 1, OT) transfers, all  -RB        [place] Level/Cues Needed (Transfer Goal 1, OT) modified independence  -RB        Time Frame (Transfer Goal 1, OT) long term goal (LTG)  -RB        Progress/Outcome (Transfer Goal 1, OT) goal not met  -RB           Bathing Goal 1 (OT)    Activity/Device (Bathing Goal 1, OT) bathing skills, all  -RB        [place] Level/Cues Needed (Bathing Goal 1, OT) supervision required  -RB        Time Frame (Bathing Goal 1, OT) long term goal (LTG)  -RB        Progress/Outcomes (Bathing Goal 1, OT) goal not met  -RB           Dressing Goal 1 (OT)    Activity/Device (Dressing Goal 1, OT) dressing skills, all  -RB        [place]/Cues Needed (Dressing  Goal 1, OT) supervision required  -RB        Time Frame (Dressing Goal 1, OT) long term goal (LTG)  -RB        Progress/Outcome (Dressing Goal 1, OT) goal not met  -RB           Toileting Goal 1 (OT)    Activity/Device (Toileting Goal 1, OT) toileting skills, all  -RB        [place] Level/Cues Needed (Toileting Goal 1, OT) modified independence  -RB        Time Frame (Toileting Goal 1, OT) long term goal (LTG)  -RB        Progress/Outcome (Toileting Goal 1, OT) goal not met  -RB                  User Key  (r) = Recorded By, (t) = Taken By, (c) = Cosigned By      Initials Name Effective Dates    RB Sincere Dyer, OT 07/11/23 -     Jennifer Diallo RN 06/16/21 -     Maryann De La Cruz RN 03/07/23 -     Charley Chowdary RN 04/24/23 -                      Occupational Therapy Education       Title: PT OT SLP Therapies (In Progress)       Topic: Occupational Therapy (In Progress)       Point: ADL training (Not Started)       Description:   Instruct learner(s) on proper safety adaptation and remediation techniques during self care or transfers.   Instruct in proper use of assistive devices.                  Learner Progress:  Not documented in this visit.              Point: Home exercise program (Not Started)       Description:   Instruct learner(s) on appropriate technique for monitoring, assisting and/or progressing therapeutic exercises/activities.                  Learner Progress:  Not documented in this visit.              Point: Precautions (Done)       Description:   Instruct learner(s) on prescribed precautions during self-care and functional transfers.                  Learning Progress Summary             Patient Acceptance, E, VU by RB at 7/17/2023 7827    Comment: Edu pt on use of gait belt and non skid socks when OOB and no OOB without assist.                         Point: Body mechanics (Not Started)       Description:   Instruct learner(s) on proper positioning and spine alignment during  self-care, functional mobility activities and/or exercises.                  Learner Progress:  Not documented in this visit.                              User Key       Initials Effective Dates Name Provider Type Discipline    RB 07/11/23 -  Sincere Dyer OT Occupational Therapist OT                      OT Recommendation and Plan  Planned Therapy Interventions (OT): activity tolerance training, adaptive equipment training, BADL retraining, functional balance retraining, occupation/activity based interventions, patient/caregiver education/training, ROM/therapeutic exercise, strengthening exercise, transfer/mobility retraining  Therapy Frequency (OT): other (see comments) (5-7 days/wk)        Outcome Measures       Row Name 07/17/23 0906             How much help from another is currently needed...    Putting on and taking off regular lower body clothing? 2  -RB      Bathing (including washing, rinsing, and drying) 2  -RB      Toileting (which includes using toilet bed pan or urinal) 2  -RB      Putting on and taking off regular upper body clothing 3  -RB      Taking care of personal grooming (such as brushing teeth) 4  -RB      Eating meals 4  -RB      AM-PAC 6 Clicks Score (OT) 17  -RB         Functional Assessment    Outcome Measure Options AM-PAC 6 Clicks Daily Activity (OT)  -RB                User Key  (r) = Recorded By, (t) = Taken By, (c) = Cosigned By      Initials Name Provider Type    RB Sincere Dyer OT Occupational Therapist                    Time Calculation:    Time Calculation- OT       Row Name 07/17/23 1323             Time Calculation- OT    OT Start Time 0905  -RB      OT Stop Time 1002  -RB      OT Time Calculation (min) 57 min  -RB      OT Received On 07/17/23  -RB      OT Goal Re-Cert Due Date 07/30/23  -RB                User Key  (r) = Recorded By, (t) = Taken By, (c) = Cosigned By      Initials Name Provider Type    RB Sincere Dyer OT Occupational Therapist                  Therapy  Charges for Today       Code Description Service Date Service Provider Modifiers Qty    18620324212 HC OT EVAL MOD COMPLEXITY 4 7/17/2023 Sincere Dyer, GURU GO 1                 Sincere Dyer OT  7/17/2023

## 2023-07-17 NOTE — PROGRESS NOTES
[facility]  INPATIENT WOUND & OSTOMY CARE    PROGRESS NOTE    Today's Date: 07/17/23    Patient Name: Emre Lisa  MRN: [id number]  CSN: [id number]  PCP: Jamaal Bravo MD  Referring Provider: Chente Ross MD  Attending Provider: Chente Ross MD  Length of Stay: 0    SUBJECTIVE   Chief Complaint: edema    HPI: Emre Lisa is a 63 year old male I was consulted on for wound care/assessment. Patient is under the care of podiatry for chronic osteomyelitis of left foot. He is status post removal of ex-fix LLE on 5/15/2013 and is being followed by podiatry for this. Patient in 4 layer compression wrap.      Visit Dx:    ICD-10-CM ICD-9-CM   1. Fever of unknown origin  R50.9 780.60   2. Sepsis due to other etiology  A41.89 038.8       Hospital Problem List:     Sepsis    Fever, unknown origin      History:   Past Medical History:   Diagnosis Date    Arthritis     Atrial fibrillation     Diabetes mellitus     Diabetic foot ulcer associated with type 2 diabetes mellitus     left great toe    Hallux malleus of left foot     Hammer toe     Hypertension     MI (myocardial infarction)     Neuropathy in diabetes     Onychomycosis     Presence of biventricular cardiac pacemaker     Rheumatoid arthritis      Past Surgical History:   Procedure Laterality Date    AMPUTATION DIGIT Right 03/05/2017    Procedure: RIGHT AMPUTATION TRANSMETATRASAL , RECESSION GASTROCNEMOUS;  Surgeon: Marco A Thompson DPM;  Location: [facility];  Service:     ANKLE FUSION Left 04/13/2023    Procedure: REDUCTION OF LEFT ANKLE DEFORMITY WITH APPLICATION OF EXTERNAL FIXATOR;  Surgeon: Marco A Thompson DPM;  Location: [facility];  Service: Podiatry;  Laterality: Left;    CARDIAC CATHETERIZATION      CARDIAC DEFIBRILLATOR PLACEMENT  2010, 2016    CARPAL TUNNEL RELEASE  2015    elbow and wrist left arm    FOOT FUSION Left 05/15/2023    Procedure: REMOVAL OF EXTERNAL FIXATOR LEFT LOWER EXTREMITY WITH TIBIAL  TALOCALCANEAL ARTHRODESIS  AND ALL INDICATED PROCEDURES;  Surgeon: Marco A Thompson DPM;  Location: [facility] OR;  Service: Podiatry;  Laterality: Left;    FOOT IRRIGATION, DEBRIDEMENT AND REPAIR Left 03/07/2018    Procedure: FOOT IRRIGATION, DEBRIDEMENT AND REPAIR;  Surgeon: Marco A Thompson DPM;  Location: [facility] OR;  Service:     FOOT IRRIGATION, DEBRIDEMENT AND REPAIR Left 03/10/2018    Procedure: FOOT IRRIGATION, DEBRIDEMENT AND REPAIR;  Surgeon: Marco A Thompson DPM;  Location: [facility] OR;  Service: Podiatry    FOOT WOUND CLOSURE Right 03/02/2017    Procedure: INCISION AND DRAINAGE, RIGHT FOOT;  Surgeon: Tung Rosenthal DPM;  Location: [facility] OR;  Service:     HAMMER TOE REPAIR Left 02/15/2018    Procedure: HAMMERTOE CORRECTION LEFT SECOND, THIRD AND FOURTH TOES AND HALLUX INTERPHALANGEAL JOINT ARTHRODESIS LEFT FOOT (Micro Asnis, 4.0 & 5.0 Asnis)      (c-arm);  Surgeon: Marco A Thompson DPM;  Location: [facility] OR;  Service:     HARDWARE REMOVAL Left 03/05/2018    Procedure: ANKLE/FOOT HARDWARE REMOVAL;  Surgeon: Marco A Thompson DPM;  Location: [facility] OR;  Service:     INCISION AND DRAINAGE LEG Left 03/05/2018    Procedure: INCISION AND DRAINAGE LOWER EXTREMITY;  Surgeon: Marco A Thompson DPM;  Location: [facility] OR;  Service:     PLANTAR FASCIA RELEASE Left 11/21/2019    Procedure: PLANTAR PLANING LEFT FOOT AND ALL OTHER INDICATED PROCEDURES;  Surgeon: Marco A Thompson DPM;  Location: [facility] OR;  Service: Podiatry    TOE AMPUTATION Right 2016    TONSILECTOMY, ADENOIDECTOMY, BILATERAL MYRINGOTOMY AND TUBES      age 25     Social History     Socioeconomic History    Marital status:    Tobacco Use    Smoking status: Never    Smokeless tobacco: Never   Vaping Use    Vaping Use: Never used   Substance and Sexual Activity    Alcohol use: Yes     Comment: social, once every three months    Drug use: No    Sexual activity: Defer       Allergies:  Allergies   Allergen Reactions    Heparin Other (See Comments)      Heparin induce thrombocytopenia     Januvia [Sitagliptin] Hives    Lipitor [Atorvastatin] Other (See Comments)     Muscle Cramps...    Shellfish Allergy Swelling and Other (See Comments)     Age 13 this occurred doesn't remember what he ate swelled lips and face    Sulfa Antibiotics Rash     Pt was age of 4 when he was told by his family he had a reaction, pt is unsure        Medications:    Current Facility-Administered Medications:     [START ON 7/18/2023] !Vancomycin Peak Level  Needed, , Does not apply, Once, Chente Ross MD    [START ON 7/18/2023] !Vancomycin Trough Level Needed, , Does not apply, Once, Chente Ross MD    acetaminophen (TYLENOL) tablet 650 mg, 650 mg, Oral, Q6H PRN, Edgard Neff MD, 650 mg at 07/17/23 0731    aluminum-magnesium hydroxide-simethicone (MAALOX MAX) 400-400-40 MG/5ML suspension 15 mL, 15 mL, Oral, Q6H PRN, Chente Ross MD    apixaban (ELIQUIS) tablet 5 mg, 5 mg, Oral, Q12H, Chente Ross MD, 5 mg at 07/17/23 1103    ascorbic acid (VITAMIN C) tablet 1,000 mg, 1,000 mg, Oral, Nightly, Chente Ross MD, 1,000 mg at 07/16/23 2104    Calcium Replacement - Follow Nurse / BPA Driven Protocol, , Does not apply, PRN, Chente Ross MD    cefepime (MAXIPIME) 2000 mg/100 mL 0.9% NS (mbp), 2,000 mg, Intravenous, Q8H, Chente Ross MD    cetirizine (zyrTEC) tablet 10 mg, 10 mg, Oral, Nightly, Chente Ross MD, 10 mg at 07/16/23 2104    cholecalciferol (VITAMIN D3) tablet 5,000 Units, 5,000 Units, Oral, Nightly, Chente Ross MD, 5,000 Units at 07/16/23 2103    diphenhydrAMINE (BENADRYL) capsule 25 mg, 25 mg, Oral, Nightly PRN, Chente Ross MD    dronedarone (MULTAQ) tablet 400 mg, 400 mg, Oral, Nightly, Chente Ross MD, 400 mg at 07/16/23 2150    magnesium oxide (MAG-OX) tablet 400 mg, 400 mg, Oral, BID, Chente Ross MD, 400 mg at 07/17/23 1103    Magnesium Standard Dose Replacement - Follow Nurse / BPA Driven  Protocol, , Does not apply, PRNCody Tyler B, MD    metoprolol succinate XL (TOPROL-XL) 24 hr tablet 25 mg, 25 mg, Oral, BID, Chente Ross MD, 25 mg at 07/17/23 1103    morphine injection 1 mg, 1 mg, Intravenous, Q4H PRN **AND** naloxone (NARCAN) injection 0.4 mg, 0.4 mg, Intravenous, Q5 Min PRN, Chente Ross MD    multivitamin (THERAGRAN) tablet 1 tablet, 1 tablet, Oral, Nightly, Chente Ross MD, 1 tablet at 07/16/23 2104    ondansetron (ZOFRAN) injection 4 mg, 4 mg, Intravenous, Q6H PRN, Chente Ross MD, 4 mg at 07/17/23 0801    ondansetron (ZOFRAN) tablet 4 mg, 4 mg, Oral, Q6H PRN, Chente Ross MD    Pharmacy to Dose Cefepime, , Does not apply, Continuous PRCody WHITESIDE Tyler B, MD    Pharmacy to dose vancomycin, , Does not apply, Continuous Cody SILVESTRE Tyler B, MD    Phosphorus Replacement - Follow Nurse / BPA Driven Protocol, , Does not apply, Cody SILVESTRE Tyler B, MD    Potassium Replacement - Follow Nurse / BPA Driven Protocol, , Does not apply, Cody SILVESTRE Tyler B, MD    sodium chloride 0.9 % flush 10 mL, 10 mL, Intravenous, PRNCody Tyler B, MD    sodium chloride 0.9 % flush 10 mL, 10 mL, Intravenous, Q12H, Chente Ross MD, 10 mL at 07/17/23 1104    sodium chloride 0.9 % flush 10 mL, 10 mL, Intravenous, PRNCody Tyler B, MD    sodium chloride 0.9 % infusion 40 mL, 40 mL, Intravenous, PRNCody Tyler B, MD    vancomycin 1750 mg/500 mL 0.9% NS IVPB ([facility]), 1,750 mg, Intravenous, Q12H, Chente Ross MD, Stopped at 07/17/23 0300    Review of Systems:  Review of Systems      OBJECTIVE     Vitals:    07/17/23 0838   BP: 130/80   Pulse: (!) 122   Resp:    Temp: (!) 101.3 °F (38.5 °C)   SpO2: 91%       PHYSICAL EXAM  Physical Exam  Physical Exam  Vitals reviewed. Nursing notes reviewed.  Constitutional:       Appearance: Well-developed.   Skin:     General: Skin is warm and dry.      Coloration: Skin is not pale.      Findings: No  erythema or rash. 4-layer compression wrap to LLE  Neurological:      Mental Status: Pt is alert and oriented to person, place, and time.   Psychiatric:         Behavior: Behavior normal.         Thought Content: Thought content normal.         Judgment: Judgment normal.       Results Review:  Lab Results (last 48 hours)       Procedure Component Value Units Date/Time    Sedimentation Rate [[id number]]  (Abnormal) Collected: 07/17/23 0535    Specimen: Blood Updated: 07/17/23 1030     Sed Rate 23 mm/hr     Blood Culture - Blood, Arm, Left [[id number]]  (Normal) Collected: 07/16/23 0952    Specimen: Blood from Arm, Left Updated: 07/17/23 1015     Blood Culture No growth at 24 hours    Blood Culture - Blood, Arm, Left [[id number]]  (Normal) Collected: 07/16/23 1001    Specimen: Blood from Arm, Left Updated: 07/17/23 1015     Blood Culture No growth at 24 hours    Manual Differential [[id number]] Collected: 07/17/23 0535    Specimen: Blood Updated: 07/17/23 0802     Neutrophil % 71.0 %      Lymphocyte % 22.0 %      Monocyte % 5.0 %      Basophil % 1.0 %      Bands %  1.0 %      Neutrophils Absolute 3.23 10*3/mm3      Lymphocytes Absolute 0.99 10*3/mm3      Monocytes Absolute 0.22 10*3/mm3      Basophils Absolute 0.04 10*3/mm3      Anisocytosis Slight/1+     WBC Morphology Normal     Platelet Estimate Decreased    POC Glucose Once [[id number]]  (Abnormal) Collected: 07/17/23 0622    Specimen: Blood Updated: 07/17/23 0635     Glucose 147 mg/dL      Comment: RN NotifiedOperator: [id number] BLAYNE LACEYMeter ID: [id number]       Basic Metabolic Panel [[id number]]  (Abnormal) Collected: 07/17/23 0535    Specimen: Blood Updated: 07/17/23 0635     Glucose 125 mg/dL      BUN 17 mg/dL      Creatinine 0.93 mg/dL      Sodium 136 mmol/L      Potassium 3.8 mmol/L      Chloride 103 mmol/L      CO2 24.0 mmol/L      Calcium 8.2 mg/dL      BUN/Creatinine Ratio 18.3     Anion Gap 9.0 mmol/L      eGFR 93.4 mL/min/1.73     Narrative:       GFR Normal >60  Chronic Kidney Disease <60  Kidney Failure <15      CBC & Differential [[id number]]  (Abnormal) Collected: 07/17/23 0535    Specimen: Blood Updated: 07/17/23 0625    Narrative:      The following orders were created for panel order CBC & Differential.  Procedure                               Abnormality         Status                     ---------                               -----------         ------                     CBC Auto Differential[[id number]]        Abnormal            Final result               Scan Slide[[id number]]                                                                    Please view results for these tests on the individual orders.    CBC Auto Differential [[id number]]  (Abnormal) Collected: 07/17/23 0535    Specimen: Blood Updated: 07/17/23 0625     WBC 4.49 10*3/mm3      RBC 3.68 10*6/mm3      Hemoglobin 10.4 g/dL      Hematocrit 31.5 %      MCV 85.6 fL      MCH 28.3 pg      MCHC 33.0 g/dL      RDW 17.3 %      RDW-SD 54.4 fl      MPV 11.0 fL      Platelets 91 10*3/mm3     STAT Lactic Acid, Reflex [[id number]]  (Normal) Collected: 07/17/23 0000    Specimen: Blood Updated: 07/17/23 0022     Lactate 1.0 mmol/L     POC Glucose Once [[id number]]  (Abnormal) Collected: 07/16/23 1924    Specimen: Blood Updated: 07/16/23 1938     Glucose 134 mg/dL      Comment: RN NotifiedOperator: [id number] RAKAN Pedersen ID: [id number]       STAT Lactic Acid, Reflex [[id number]]  (Abnormal) Collected: 07/16/23 1727    Specimen: Blood Updated: 07/16/23 1751     Lactate 2.3 mmol/L     POC Glucose Once [[id number]]  (Abnormal) Collected: 07/16/23 1639    Specimen: Blood Updated: 07/16/23 1652     Glucose 152 mg/dL      Comment: RN NotifiedOperator: [id number] QUIANA JUFREEMANNMlesley ID: [id number]       STAT Lactic Acid, Reflex [[id number]]  (Abnormal) Collected: 07/16/23 1436    Specimen: Blood Updated: 07/16/23 1500     Lactate 2.5 mmol/L     TSH [[id number]]  (Normal) Collected: 07/16/23 0913     Specimen: Blood Updated: 07/16/23 1343     TSH 0.439 uIU/mL     C-reactive Protein [[id number]]  (Abnormal) Collected: 07/16/23 0913    Specimen: Blood Updated: 07/16/23 1336     C-Reactive Protein 11.00 mg/dL     STAT Lactic Acid, Reflex [[id number]]  (Abnormal) Collected: 07/16/23 1206    Specimen: Blood Updated: 07/16/23 1236     Lactate 2.5 mmol/L     Respiratory Panel PCR w/COVID-19(SARS-CoV-2) TRICIA/SABI/ALFA/PAD/COR/MAD/RUBI In-House, NP Swab in UTM/VTM, 3-4 HR TAT - Swab, Nasopharynx [[id number]]  (Normal) Collected: 07/16/23 1035    Specimen: Swab from Nasopharynx Updated: 07/16/23 1135     ADENOVIRUS, PCR Not Detected     Coronavirus 229E Not Detected     Coronavirus HKU1 Not Detected     Coronavirus NL63 Not Detected     Coronavirus OC43 Not Detected     COVID19 Not Detected     Human Metapneumovirus Not Detected     Human Rhinovirus/Enterovirus Not Detected     Influenza A PCR Not Detected     Influenza B PCR Not Detected     Parainfluenza Virus 1 Not Detected     Parainfluenza Virus 2 Not Detected     Parainfluenza Virus 3 Not Detected     Parainfluenza Virus 4 Not Detected     RSV, PCR Not Detected     Bordetella pertussis pcr Not Detected     Bordetella parapertussis PCR Not Detected     Chlamydophila pneumoniae PCR Not Detected     Mycoplasma pneumo by PCR Not Detected    Narrative:      In the setting of a positive respiratory panel with a viral infection PLUS a negative procalcitonin without other underlying concern for bacterial infection, consider observing off antibiotics or discontinuation of antibiotics and continue supportive care. If the respiratory panel is positive for atypical bacterial infection (Bordetella pertussis, Chlamydophila pneumoniae, or Mycoplasma pneumoniae), consider antibiotic de-escalation to target atypical bacterial infection.    Urinalysis, Microscopic Only - Straight Cath [[id number]]  (Abnormal) Collected: 07/16/23 1007    Specimen: Urine from Straight Cath Updated: 07/16/23  1053     RBC, UA 0-2 /HPF      WBC, UA 0-2 /HPF      Bacteria, UA None Seen /HPF      Squamous Epithelial Cells, UA 0-2 /HPF      Hyaline Casts, UA 0-2 /LPF      Methodology Manual Light Microscopy    Fentanyl, Urine - Straight Cath [[id number]]  (Normal) Collected: 07/16/23 1007    Specimen: Urine from Straight Cath Updated: 07/16/23 1032     Fentanyl, Urine Negative    Narrative:      Negative Threshold:      Fentanyl 5 ng/mL     The normal value for the drug tested is negative. This report includes final unconfirmed screening results to be used for medical treatment purposes only. Unconfirmed results must not be used for non-medical purposes such as employment or legal testing. Clinical consideration should be applied to any drug of abuse test, particularly when unconfirmed results are used.           Urinalysis With Microscopic If Indicated (No Culture) - Straight Cath [[id number]]  (Abnormal) Collected: 07/16/23 1007    Specimen: Urine from Straight Cath Updated: 07/16/23 1028     Color, UA Yellow     Appearance, UA Clear     pH, UA <=5.0     Specific Gravity, UA 1.027     Glucose, UA Negative     Ketones, UA Trace     Bilirubin, UA Negative     Blood, UA Negative     Protein, UA 30 mg/dL (1+)     Leuk Esterase, UA Negative     Nitrite, UA Negative     Urobilinogen, UA 1.0 E.U./dL    Urine Drug Screen - Straight Cath [[id number]]  (Normal) Collected: 07/16/23 1007    Specimen: Urine from Straight Cath Updated: 07/16/23 1026     THC, Screen, Urine Negative     Phencyclidine (PCP), Urine Negative     Cocaine Screen, Urine Negative     Methamphetamine, Ur Negative     Opiate Screen Negative     Amphetamine Screen, Urine Negative     Benzodiazepine Screen, Urine Negative     Tricyclic Antidepressants Screen Negative     Methadone Screen, Urine Negative     Barbiturates Screen, Urine Negative     Oxycodone Screen, Urine Negative     Propoxyphene Screen Negative     Buprenorphine, Screen, Urine Negative    Narrative:       Cutoff For Drugs Screened:    Amphetamines               500 ng/ml  Barbiturates               200 ng/ml  Benzodiazepines            150 ng/ml  Cocaine                    150 ng/ml  Methadone                  200 ng/ml  Opiates                    100 ng/ml  Phencyclidine               25 ng/ml  THC                            50 ng/ml  Methamphetamine            500 ng/ml  Tricyclic Antidepressants  300 ng/ml  Oxycodone                  100 ng/ml  Propoxyphene               300 ng/ml  Buprenorphine               10 ng/ml    The normal value for all drugs tested is negative. This report includes unconfirmed screening results, with the cutoff values listed, to be used for medical treatment purposes only.  Unconfirmed results must not be used for non-medical purposes such as employment or legal testing.  Clinical consideration should be applied to any drug of abuse test, particularly when unconfirmed results are used.      Tyler Draw [[id number]] Collected: 07/16/23 0913    Specimen: Blood Updated: 07/16/23 1017    Narrative:      The following orders were created for panel order Tyler Draw.  Procedure                               Abnormality         Status                     ---------                               -----------         ------                     Green Top (Gel)[[id number]]                                  Final result               Lavender Top[[id number]]                                     Final result               Gold Top - SST[[id number]]                                   Final result               Light Blue Top[[id number]]                                   Final result                 Please view results for these tests on the individual orders.    Lavender Top [[id number]] Collected: 07/16/23 0913    Specimen: Blood Updated: 07/16/23 1017     Extra Tube hold for add-on     Comment: Auto resulted       Gold Top - SST [[id number]] Collected: 07/16/23 0913    Specimen: Blood Updated: 07/16/23  1017     Extra Tube Hold for add-ons.     Comment: Auto resulted.       Light Blue Top [[id number]] Collected: 07/16/23 0913    Specimen: Blood Updated: 07/16/23 1017     Extra Tube Hold for add-ons.     Comment: Auto resulted       Green Top (Gel) [[id number]] Collected: 07/16/23 0913    Specimen: Blood Updated: 07/16/23 1017     Extra Tube Hold for add-ons.     Comment: Auto resulted.       Comprehensive Metabolic Panel [[id number]]  (Abnormal) Collected: 07/16/23 0913    Specimen: Blood Updated: 07/16/23 0945     Glucose 242 mg/dL      BUN 28 mg/dL      Creatinine 1.16 mg/dL      Sodium 133 mmol/L      Potassium 4.1 mmol/L      Chloride 96 mmol/L      CO2 20.0 mmol/L      Calcium 8.5 mg/dL      Total Protein 7.2 g/dL      Albumin 3.8 g/dL      ALT (SGPT) 23 U/L      AST (SGOT) 17 U/L      Alkaline Phosphatase 106 U/L      Total Bilirubin 1.3 mg/dL      Globulin 3.4 gm/dL      A/G Ratio 1.1 g/dL      BUN/Creatinine Ratio 24.1     Anion Gap 17.0 mmol/L      eGFR 71.7 mL/min/1.73     Narrative:      GFR Normal >60  Chronic Kidney Disease <60  Kidney Failure <15      Ethanol [[id number]] Collected: 07/16/23 0913    Specimen: Blood Updated: 07/16/23 0945     Ethanol <10 mg/dL      Ethanol % <0.010 %     Single High Sensitivity Troponin T [[id number]]  (Abnormal) Collected: 07/16/23 0913    Specimen: Blood Updated: 07/16/23 0944     HS Troponin T 30 ng/L     Narrative:      High Sensitive Troponin T Reference Range:  <10.0 ng/L- Negative Female for AMI  <15.0 ng/L- Negative Male for AMI  >=10 - Abnormal Female indicating possible myocardial injury.  >=15 - Abnormal Male indicating possible myocardial injury.   Clinicians would have to utilize clinical acumen, EKG, Troponin, and serial changes to determine if it is an Acute Myocardial Infarction or myocardial injury due to an underlying chronic condition.         Lactic Acid, Plasma [[id number]]  (Abnormal) Collected: 07/16/23 0913    Specimen: Blood Updated: 07/16/23 0943      Lactate 4.7 mmol/L     Protime-INR [[id number]]  (Abnormal) Collected: 07/16/23 0913    Specimen: Blood Updated: 07/16/23 0939     Protime 17.6 Seconds      INR 1.46    Narrative:      Therapeutic range for most indications is 2.0-3.0 INR,  or 2.5-3.5 for mechanical heart valves.    CBC & Differential [[id number]]  (Abnormal) Collected: 07/16/23 0913    Specimen: Blood Updated: 07/16/23 0920    Narrative:      The following orders were created for panel order CBC & Differential.  Procedure                               Abnormality         Status                     ---------                               -----------         ------                     CBC Auto Differential[[id number]]        Abnormal            Final result                 Please view results for these tests on the individual orders.    CBC Auto Differential [[id number]]  (Abnormal) Collected: 07/16/23 0913    Specimen: Blood Updated: 07/16/23 0920     WBC 5.43 10*3/mm3      RBC 4.00 10*6/mm3      Hemoglobin 11.3 g/dL      Hematocrit 33.9 %      MCV 84.8 fL      MCH 28.3 pg      MCHC 33.3 g/dL      RDW 16.9 %      RDW-SD 52.3 fl      MPV 10.8 fL      Platelets 112 10*3/mm3      Neutrophil % 87.8 %      Lymphocyte % 7.9 %      Monocyte % 3.5 %      Eosinophil % 0.0 %      Basophil % 0.2 %      Immature Grans % 0.6 %      Neutrophils, Absolute 4.77 10*3/mm3      Lymphocytes, Absolute 0.43 10*3/mm3      Monocytes, Absolute 0.19 10*3/mm3      Eosinophils, Absolute 0.00 10*3/mm3      Basophils, Absolute 0.01 10*3/mm3      Immature Grans, Absolute 0.03 10*3/mm3      nRBC 0.0 /100 WBC     POC Glucose Once [[id number]]  (Abnormal) Collected: 07/16/23 0908    Specimen: Blood Updated: 07/16/23 0919     Glucose 228 mg/dL      Comment: RN NotifiedNotify DoctorOperator: [id number] RAKAN HALEYMeter ID: [id number]             Imaging Results (Last 72 Hours)       Procedure Component Value Units Date/Time    XR Foot 3+ View Left [[id number]] Collected: 07/17/23 1044      Updated: 07/17/23 1107    Narrative:      HISTORY:  Foot pain.    VIEWS:  Frontal, lateral and oblique images of the left foot were obtained.    FINDINGS:  There is marked fragmentation of the tarsal bones which is similar to the  patient's prior study.  There is complete collapse of the talus.  Multiple bone  fragments are noted about the calcaneus and midfoot.  There are advanced  degenerative changes involving the metatarsophalangeal joints.  There are  advanced degenerative changes involving the metatarsotarsal joints.    There is a new deformity of the base of the proximal phalanx of the fifth digit.  This is most likely secondary to a fracture.      Impression:      1.  Advanced Charcot arthropathy of the left foot which is similar to the  patient's prior study.    2.  Probable fracture involving the base of the proximal phalanx of the fifth  digit.    XR Ankle 3+ View Left [[id number]] Resulted: 07/17/23 1027     Updated: 07/17/23 1036    XR Chest 1 View [[id number]] Collected: 07/16/23 0922     Updated: 07/16/23 1316    Narrative:      XR CHEST, 1 VIEW    HISTORY:  Chills.    FINDINGS:  No pneumothorax.  No pleural effusion.  No focal airspace opacities.  Implantable cardiac device with associated leads in similar position to prior.  The cardiomediastinal silhouette and upper abdomen are unremarkable.  No acute  osseous abnormality.      CT Head Without Contrast [[id number]] Collected: 07/16/23 0940     Updated: 07/16/23 0945    Narrative:      CT HEAD WO CONTRAST    HISTORY: Confusion/delirium, altered LOC, unexplained    COMPARISON: CT 6/2/2023    Automated exposure control was also utilized to decrease patient radiation dose.    TECHNIQUE: Helical tomographic images of the brain were obtained without the use  of intravenous contrast.    FINDINGS:  Gray-white matter differentiation is maintained.    Normal appearance of midline structures.  No midline shift.  Symmetric  appearance of the hemispheres.   Basal cisterns are intact.  Fourth ventricle is  intact.  Lateral ventricles are unremarkable.      No evidence of intraparenchymal hemorrhage or abnormal extra-axial fluid  collections.  Sulci are normal in appearance.  Cerebellum is symmetric and  normal in appearance.  Brainstem is unremarkable.    Bones: No acute osseous abnormality.    Soft tissues: Unremarkable.    Sinuses and mastoid air cells: Unremarkable.      Impression:      1. No acute intracranial process.                   ASSESSMENT/PLAN       Examination and evaluation of wound(s) was performed.    DIAGNOSIS:     Edema    PLAN:     -Podiatry APRN at bedside and assisted in removing compression.  -Significantly less edema noted to LLE than previous podiatry visit.  -Continue edema modalities.  -No open wounds at this time.  -Will sign off.  -Call with questions.    Discussed findings and treatment plan including risks, benefits, and treatment options with patient in detail. Patient agreed with treatment plan.          This document has been electronically signed by WALTER Duncan on July 19, 2023 08:50 CDT

## 2023-07-17 NOTE — PLAN OF CARE
Goal Outcome Evaluation:      OT eval on this date as co-eval with PT.  Pt required mod A of 2 for supine to sit and SBA for sit to supine.  Sit to stand with min A and min A for steps to head of bed.  Pt was CGA for LB dressing activity.  Pt was independent prior and could benefit from OT services to increase independence with ADLs and funcdtional mobility.                 Anticipated Discharge Disposition (OT): home with assist, home with 24/7 care, home with home health

## 2023-07-17 NOTE — PLAN OF CARE
Goal Outcome Evaluation:  Plan of Care Reviewed With: patient        Progress: no change  Outcome Evaluation: patient had fever most of shift. 88% RA, required 2L of O2. MD aware, fluids ordered, abx infused. Patient weak and requiring assistance. Patient would not remove boot, wants to wait for wound RN

## 2023-07-17 NOTE — PLAN OF CARE
Goal Outcome Evaluation:  Plan of Care Reviewed With: patient           Outcome Evaluation: PT evaluation completed with OT. Patient AO X 4 and agreeable to therapy. Patient nasal cannula not in place when therapy entered. SpO2: 91%. Nasal cannula replaced. Patient was able to supine>sit with Mod A X 2. Patient able to sit>stand with Min A X 1. Patient able to ambulate 2 steps left to reposition and 1 step X 2 with FWW and Min A X1. Patient has increased RYLAN, decreased endurance and decreased gait speed with ambulation. Goals created, continue skilled IP PT. Recommend patient continue to ambulate with FWW to increase safety. Recommend patient d/c home with assist and HHPT.      Anticipated Discharge Disposition (PT): home with home health, home with assist

## 2023-07-17 NOTE — CONSULTS
[facility]  Podiatric Surgery Consult Note      Date of Admission: 7/16/2023  Primary Care Physician: Jamaal Bravo MD     Subjective      Chief Complaint:   Edema left ankle and foot    History of Present Illness:  63-year-old male, well-known to podiatry team, consulted at the request of admitting hospitalist for evaluation of edema of left ankle and foot. Pt has PMH significant for chronic multifocal osteomyelitis of left foot. Patient is status post removal of Ex-Fix left lower extremity with tibial talocalcaneal arthrodesis, date of service 5-. Patient's postoperative course has been uneventful with last visit in podiatry clinic being on 7- where he was found to have no open post-operative wounds and was transitioned to profore multilayer compression for more adequate edema management. Pt states over the weekend he developed fever and chills and denies any pedal complaints.    Systemic or Specific Complaints: Fever and Chills      Objective     Vital signs in last 24 hours:  Temp:  [97.8 °F (36.6 °C)-102.4 °F (39.1 °C)] 101.3 °F (38.5 °C)  Heart Rate:  [] 122  Resp:  [16-20] 20  BP: ()/(56-80) 130/80    General:  Ill-appearing, alert and oriented, NAD, resting in bed   Neurovascular: Palpable pedal pulses.   Wound: No open wounds noted.   Range of Motion: Limited ROM L ankle and foot     Data Review  CBC:  Results from last 7 days   Lab Units 07/17/23  0535   WBC 10*3/mm3 4.49   RBC 10*6/mm3 3.68*   HEMOGLOBIN g/dL 10.4*   HEMATOCRIT % 31.5*   PLATELETS 10*3/mm3 91*     Lab Results   Component Value Date    CRP 11.00 (H) 07/16/2023     Results for orders placed or performed during the hospital encounter of 07/16/23   Blood Culture - Blood, Arm, Left    Specimen: Arm, Left; Blood   Result Value Ref Range    Blood Culture No growth at 24 hours              Assessment / Plan      Assessment:   Charcot foot and ankle joint  Edema    Active  Hospital Problems    Diagnosis     **Sepsis     Fever, unknown origin      Plan:  Continue IV abx.  ESR, foot and ankle xray for review due to pt being post talocalcaneal arthrodesis.  Continue edema modalities.  Podiatry team will continue to follow while inpatient.  Call with questions.         LOS: 0 days           This document has been electronically signed by WALTER Triana on July 17, 2023 09:38 CDT

## 2023-07-17 NOTE — PROGRESS NOTES
[facility] Medicine   INPATIENT PROGRESS NOTE      Patient Name: Emre Lisa  Date of Admission: 7/16/2023  Today's Date: 07/17/23  Length of Stay: 0  Primary Care Physician: Jamaal Brvao MD    Subjective     HPI   63-year-old male with diabetes mellitus with chronic left foot ulcer and osteomyelitis of left foot, Charcot joint, s/p removal of external fixator with tibial talocalcaneal arthrodesis in May 2023, followed by podiatry with LLE wrapped in compression boot, recently hospitalized with sepsis, fever with Enterobacter bacteremia treated with Zosyn.  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    He presented back after developing fevers/chills, had sinus tachycardia, lactic acidosis, elevated CRP, admitted with sepsis.  Empirically treated with broad-spectrum antibiotics and fluid resuscitation.      He continues to have persistent fevers and chills.  No shortness of breath, chest pain, abdominal pain, dysuria.         Review of Systems   Comprehensive ROS completed  All pertinent negatives and positives are as above. All other systems have been reviewed and are negative unless otherwise stated.     Objective    Temp:  [97.8 °F (36.6 °C)-102.4 °F (39.1 °C)] 99 °F (37.2 °C)  Heart Rate:  [] 85  Resp:  [18-20] 18  BP: (109-146)/(59-80) 112/64  Physical Exam  General: Ill-appearing  Cardiac: Tachycardic, regular rhythm  Respiratory: No wheezes rales or rhonchi  Abdomen: Nontender, bowel sounds present  Extremities: Lower extremity edema, no rashes      Results Review:  I have reviewed the labs, radiology results, and diagnostic studies.    Laboratory Data:   Results from last 7 days   Lab Units 07/17/23  0535 07/16/23  0913   WBC 10*3/mm3 4.49 5.43   HEMOGLOBIN  g/dL 10.4* 11.3*   HEMATOCRIT % 31.5* 33.9*   PLATELETS 10*3/mm3 91* 112*        Results from last 7 days   Lab Units 07/17/23  0535 07/16/23  0913   SODIUM mmol/L 136 133*   POTASSIUM mmol/L 3.8 4.1   CHLORIDE mmol/L 103 96*   CO2 mmol/L 24.0 20.0*   BUN mg/dL 17 28*   CREATININE mg/dL 0.93 1.16   CALCIUM mg/dL 8.2* 8.5*   BILIRUBIN mg/dL  --  1.3*   ALK PHOS U/L  --  106   ALT (SGPT) U/L  --  23   AST (SGOT) U/L  --  17   GLUCOSE mg/dL 125* 242*       Culture Data:   Blood Culture   Date Value Ref Range Status   07/16/2023 No growth at 24 hours  Preliminary   07/16/2023 No growth at 24 hours  Preliminary     No results found for: URINECX  No results found for: RESPCX  No results found for: WOUNDCX  No results found for: STOOLCX  No components found for: BODYFLD    Radiology Data:   Imaging Results (Last 24 Hours)       Procedure Component Value Units Date/Time    XR Ankle 3+ View Left [[id number]] Collected: 07/17/23 1206     Updated: 07/17/23 1210    Narrative:      INDICATION:  charcot, joint edema.    COMPARISON:  6/5/2023..    FINDINGS:  Prior TTC fusion with grossly intact hardware.  Severe degenerative changes with  disorganization and fragmentation of both the midfoot and hindfoot compatible  with Charcot arthropathy.  Overall appearance is similar compared to prior  examination.  Prior resection of the distal fibula.  Diffuse soft tissue  swelling.  No evidence of active erosive change.      XR Foot 3+ View Left [[id number]] Collected: 07/17/23 1044     Updated: 07/17/23 1107    Narrative:      HISTORY:  Foot pain.    VIEWS:  Frontal, lateral and oblique images of the left foot were obtained.    FINDINGS:  There is marked fragmentation of the tarsal bones which is similar to the  patient's prior study.  There is complete collapse of the talus.  Multiple bone  fragments are noted about the calcaneus and midfoot.  There are advanced  degenerative changes involving the metatarsophalangeal joints.  There  are  advanced degenerative changes involving the metatarsotarsal joints.    There is a new deformity of the base of the proximal phalanx of the fifth digit.  This is most likely secondary to a fracture.      Impression:      1.  Advanced Charcot arthropathy of the left foot which is similar to the  patient's prior study.    2.  Probable fracture involving the base of the proximal phalanx of the fifth  digit.            I have reviewed the patient's current medications.     Assessment/Plan     Active Hospital Problems    Diagnosis     **Sepsis     Fever, unknown origin        Sepsis, most likely infectious source would be new soft tissue infection of foot or related to chronic osteomyelitis, rule out prosthetic hardware infection  Obtain x-rays of lower extremity, reviewed, soft tissue swelling noted, hardware appears intact  -Follow blood cultures, no growth to date  - Continue empiric IV antibiotics with vancomycin and cefepime  --Remains persistently febrile, follow fever curve  --Lactic acidosis improved with fluids  -Podiatry following, case discussed with podiatry  -- Continue skin care    Chronic HFrEF, EF 30 to 35% on echo in June, AICD present  Cautious with any further fluid resuscitation    A-fib  Monitor telemetry  Continue Multaq for rhythm control  Rate control on metoprolol twice daily as long as remains hemodynamically stable in setting of sepsis  Anticoagulated on Eliquis     History of DVT/PE  Anticoagulated on Eliquis        PT/OT     Status and plan of care discussed with case management      Discharge Planning: To be determined    Electronically signed by Chente Ross MD, 07/17/23, 15:25 CDT.

## 2023-07-17 NOTE — NURSING NOTE
Notified MD of patients increasing fever despite admin of tylenol and patient sats of 88% on RA. New orders received

## 2023-07-18 PROBLEM — A41.4 SEPTICEMIA DUE TO ENTEROBACTER: Status: ACTIVE | Noted: 2023-07-18

## 2023-07-18 LAB
ANION GAP SERPL CALCULATED.3IONS-SCNC: 9 MMOL/L (ref 5–15)
ANISOCYTOSIS BLD QL: ABNORMAL
BACTERIA BLD CULT: ABNORMAL
BACTERIA UR QL AUTO: ABNORMAL /HPF
BILIRUB UR QL STRIP: NEGATIVE
BUN SERPL-MCNC: 17 MG/DL (ref 8–23)
BUN/CREAT SERPL: 20.7 (ref 7–25)
CALCIUM SPEC-SCNC: 8.3 MG/DL (ref 8.6–10.5)
CHLORIDE SERPL-SCNC: 104 MMOL/L (ref 98–107)
CLARITY UR: CLEAR
CO2 SERPL-SCNC: 25 MMOL/L (ref 22–29)
COLOR UR: YELLOW
CREAT SERPL-MCNC: 0.82 MG/DL (ref 0.76–1.27)
DEPRECATED RDW RBC AUTO: 53.9 FL (ref 37–54)
EGFRCR SERPLBLD CKD-EPI 2021: 99.9 ML/MIN/1.73
EOSINOPHIL # BLD MANUAL: 0.06 10*3/MM3 (ref 0–0.4)
EOSINOPHIL NFR BLD MANUAL: 2 % (ref 0.3–6.2)
ERYTHROCYTE [DISTWIDTH] IN BLOOD BY AUTOMATED COUNT: 17.2 % (ref 12.3–15.4)
GLUCOSE BLDC GLUCOMTR-MCNC: 145 MG/DL (ref 70–130)
GLUCOSE BLDC GLUCOMTR-MCNC: 165 MG/DL (ref 70–130)
GLUCOSE BLDC GLUCOMTR-MCNC: 178 MG/DL (ref 70–130)
GLUCOSE BLDC GLUCOMTR-MCNC: 204 MG/DL (ref 70–130)
GLUCOSE SERPL-MCNC: 157 MG/DL (ref 65–99)
GLUCOSE UR STRIP-MCNC: NEGATIVE MG/DL
GRAN CASTS URNS QL MICRO: ABNORMAL /LPF
HCT VFR BLD AUTO: 28.8 % (ref 37.5–51)
HGB BLD-MCNC: 9.5 G/DL (ref 13–17.7)
HGB UR QL STRIP.AUTO: NEGATIVE
HYALINE CASTS UR QL AUTO: ABNORMAL /LPF
KETONES UR QL STRIP: NEGATIVE
LEUKOCYTE ESTERASE UR QL STRIP.AUTO: NEGATIVE
LYMPHOCYTES # BLD MANUAL: 0.84 10*3/MM3 (ref 0.7–3.1)
LYMPHOCYTES NFR BLD MANUAL: 4 % (ref 5–12)
MCH RBC QN AUTO: 28.2 PG (ref 26.6–33)
MCHC RBC AUTO-ENTMCNC: 33 G/DL (ref 31.5–35.7)
MCV RBC AUTO: 85.5 FL (ref 79–97)
MONOCYTES # BLD: 0.12 10*3/MM3 (ref 0.1–0.9)
NEUTROPHILS # BLD AUTO: 1.97 10*3/MM3 (ref 1.7–7)
NEUTROPHILS NFR BLD MANUAL: 56 % (ref 42.7–76)
NEUTS BAND NFR BLD MANUAL: 10 % (ref 0–5)
NITRITE UR QL STRIP: NEGATIVE
PH UR STRIP.AUTO: 5.5 [PH] (ref 5–9)
PLATELET # BLD AUTO: 80 10*3/MM3 (ref 140–450)
PMV BLD AUTO: 11.2 FL (ref 6–12)
POTASSIUM SERPL-SCNC: 3.8 MMOL/L (ref 3.5–5.2)
PROT UR QL STRIP: ABNORMAL
RBC # BLD AUTO: 3.37 10*6/MM3 (ref 4.14–5.8)
RBC # UR STRIP: ABNORMAL /HPF
REF LAB TEST METHOD: ABNORMAL
SMALL PLATELETS BLD QL SMEAR: ABNORMAL
SODIUM SERPL-SCNC: 138 MMOL/L (ref 136–145)
SP GR UR STRIP: 1.02 (ref 1–1.03)
SQUAMOUS #/AREA URNS HPF: ABNORMAL /HPF
UROBILINOGEN UR QL STRIP: ABNORMAL
VANCOMYCIN PEAK SERPL-MCNC: 30.1 MCG/ML (ref 20–40)
VARIANT LYMPHS NFR BLD MANUAL: 1 % (ref 0–5)
VARIANT LYMPHS NFR BLD MANUAL: 27 % (ref 19.6–45.3)
WBC # UR STRIP: ABNORMAL /HPF
WBC MORPH BLD: NORMAL
WBC NRBC COR # BLD: 2.99 10*3/MM3 (ref 3.4–10.8)

## 2023-07-18 PROCEDURE — 85025 COMPLETE CBC W/AUTO DIFF WBC: CPT | Performed by: STUDENT IN AN ORGANIZED HEALTH CARE EDUCATION/TRAINING PROGRAM

## 2023-07-18 PROCEDURE — 99231 SBSQ HOSP IP/OBS SF/LOW 25: CPT | Performed by: PODIATRIST

## 2023-07-18 PROCEDURE — 0 CEFEPIME PER 500 MG: Performed by: STUDENT IN AN ORGANIZED HEALTH CARE EDUCATION/TRAINING PROGRAM

## 2023-07-18 PROCEDURE — 82948 REAGENT STRIP/BLOOD GLUCOSE: CPT

## 2023-07-18 PROCEDURE — 25010000002 VANCOMYCIN 10 G RECONSTITUTED SOLUTION: Performed by: STUDENT IN AN ORGANIZED HEALTH CARE EDUCATION/TRAINING PROGRAM

## 2023-07-18 PROCEDURE — 80202 ASSAY OF VANCOMYCIN: CPT | Performed by: STUDENT IN AN ORGANIZED HEALTH CARE EDUCATION/TRAINING PROGRAM

## 2023-07-18 PROCEDURE — 97530 THERAPEUTIC ACTIVITIES: CPT

## 2023-07-18 PROCEDURE — 97535 SELF CARE MNGMENT TRAINING: CPT

## 2023-07-18 PROCEDURE — 85007 BL SMEAR W/DIFF WBC COUNT: CPT | Performed by: STUDENT IN AN ORGANIZED HEALTH CARE EDUCATION/TRAINING PROGRAM

## 2023-07-18 PROCEDURE — 97110 THERAPEUTIC EXERCISES: CPT

## 2023-07-18 PROCEDURE — 80048 BASIC METABOLIC PNL TOTAL CA: CPT | Performed by: STUDENT IN AN ORGANIZED HEALTH CARE EDUCATION/TRAINING PROGRAM

## 2023-07-18 PROCEDURE — 81001 URINALYSIS AUTO W/SCOPE: CPT | Performed by: PODIATRIST

## 2023-07-18 RX ORDER — FLUTICASONE PROPIONATE 50 MCG
2 SPRAY, SUSPENSION (ML) NASAL DAILY
Status: DISCONTINUED | OUTPATIENT
Start: 2023-07-18 | End: 2023-07-22 | Stop reason: HOSPADM

## 2023-07-18 RX ORDER — ACETAMINOPHEN 325 MG/1
650 TABLET ORAL EVERY 6 HOURS PRN
Status: DISCONTINUED | OUTPATIENT
Start: 2023-07-18 | End: 2023-07-22 | Stop reason: HOSPADM

## 2023-07-18 RX ADMIN — Medication 400 MG: at 08:03

## 2023-07-18 RX ADMIN — THERA TABS 1 TABLET: TAB at 21:07

## 2023-07-18 RX ADMIN — METOPROLOL SUCCINATE 25 MG: 25 TABLET, FILM COATED, EXTENDED RELEASE ORAL at 08:02

## 2023-07-18 RX ADMIN — ACETAMINOPHEN 650 MG: 325 TABLET, FILM COATED ORAL at 22:57

## 2023-07-18 RX ADMIN — APIXABAN 5 MG: 5 TABLET, FILM COATED ORAL at 21:07

## 2023-07-18 RX ADMIN — DRONEDARONE 400 MG: 400 TABLET, FILM COATED ORAL at 21:07

## 2023-07-18 RX ADMIN — OXYCODONE HYDROCHLORIDE AND ACETAMINOPHEN 1000 MG: 500 TABLET ORAL at 21:07

## 2023-07-18 RX ADMIN — FLUTICASONE PROPIONATE 2 SPRAY: 50 SPRAY, METERED NASAL at 16:23

## 2023-07-18 RX ADMIN — METOPROLOL SUCCINATE 25 MG: 25 TABLET, FILM COATED, EXTENDED RELEASE ORAL at 21:07

## 2023-07-18 RX ADMIN — Medication 400 MG: at 21:07

## 2023-07-18 RX ADMIN — CEFEPIME 2000 MG: 2 INJECTION, POWDER, FOR SOLUTION INTRAVENOUS at 21:07

## 2023-07-18 RX ADMIN — VANCOMYCIN HYDROCHLORIDE 1750 MG: 10 INJECTION, POWDER, LYOPHILIZED, FOR SOLUTION INTRAVENOUS at 00:37

## 2023-07-18 RX ADMIN — ACETAMINOPHEN 650 MG: 325 TABLET, FILM COATED ORAL at 16:51

## 2023-07-18 RX ADMIN — Medication 5000 UNITS: at 21:07

## 2023-07-18 RX ADMIN — CEFEPIME 2000 MG: 2 INJECTION, POWDER, FOR SOLUTION INTRAVENOUS at 05:32

## 2023-07-18 RX ADMIN — DOCUSATE SODIUM 50 MG AND SENNOSIDES 8.6 MG 2 TABLET: 8.6; 5 TABLET, FILM COATED ORAL at 08:02

## 2023-07-18 RX ADMIN — Medication 10 ML: at 08:03

## 2023-07-18 RX ADMIN — DOCUSATE SODIUM 50 MG AND SENNOSIDES 8.6 MG 2 TABLET: 8.6; 5 TABLET, FILM COATED ORAL at 21:06

## 2023-07-18 RX ADMIN — CEFEPIME 2000 MG: 2 INJECTION, POWDER, FOR SOLUTION INTRAVENOUS at 15:25

## 2023-07-18 RX ADMIN — DOCUSATE SODIUM 50 MG AND SENNOSIDES 8.6 MG 2 TABLET: 8.6; 5 TABLET, FILM COATED ORAL at 02:12

## 2023-07-18 RX ADMIN — ACETAMINOPHEN 650 MG: 325 TABLET, FILM COATED ORAL at 08:02

## 2023-07-18 RX ADMIN — APIXABAN 5 MG: 5 TABLET, FILM COATED ORAL at 08:02

## 2023-07-18 RX ADMIN — Medication 10 ML: at 21:08

## 2023-07-18 RX ADMIN — ACETAMINOPHEN 650 MG: 325 TABLET, FILM COATED ORAL at 02:12

## 2023-07-18 RX ADMIN — CETIRIZINE HYDROCHLORIDE 10 MG: 10 TABLET, FILM COATED ORAL at 21:07

## 2023-07-18 NOTE — THERAPY TREATMENT NOTE
Acute Care - Physical Therapy Treatment Note  [facility]     Patient Name: Emre Lisa  : 1960  MRN: [id number]  Today's Date: 2023      Visit Dx:     ICD-10-CM ICD-9-CM   1. Fever of unknown origin  R50.9 780.60   2. Sepsis due to other etiology  A41.89 038.8   3. Impaired functional mobility, balance, gait, and endurance [Z74.09 (ICD-10-CM)]  Z74.09 V49.89   4. Impaired mobility and activities of daily living [Z74.09, Z78.9 (ICD-10-CM)]  Z74.09 V49.89    Z78.9      Patient Active Problem List   Diagnosis    Foot osteomyelitis, right    Nodule of groin    Type 2 diabetes mellitus with skin complication    Status post transmetatarsal amputation of right foot    Hammer toe of left foot    Hallux malleus of left foot    Cellulitis of left foot    Infected hardware in left leg    Chronic multifocal osteomyelitis of left foot    Charcot's joint of left foot    Skin ulcer of left foot, limited to breakdown of skin    Dyspnea    Syncope    Acute respiratory failure with hypoxia    Tachycardia    Neuropathy due to secondary diabetes mellitus    Atrial fibrillation    Presence of biventricular cardiac pacemaker    Hypertension    Medically complex patient    Fever of unknown origin    Bacteremia    Primary osteoarthritis of knees, bilateral    Sepsis    Fever, unknown origin    Septicemia due to Enterobacter     Past Medical History:   Diagnosis Date    Arthritis     Atrial fibrillation     Diabetes mellitus     Diabetic foot ulcer associated with type 2 diabetes mellitus     left great toe    Hallux malleus of left foot     Hammer toe     Hypertension     MI (myocardial infarction)     Neuropathy in diabetes     Onychomycosis     Presence of biventricular cardiac pacemaker     Rheumatoid arthritis      Past Surgical History:   Procedure Laterality Date    AMPUTATION DIGIT Right 2017    Procedure: RIGHT AMPUTATION TRANSMETATRASAL , RECESSION GASTROCNEMOUS;  Surgeon: Marco A Thompson DPM;   Location: [facility] OR;  Service:     ANKLE FUSION Left 04/13/2023    Procedure: REDUCTION OF LEFT ANKLE DEFORMITY WITH APPLICATION OF EXTERNAL FIXATOR;  Surgeon: Marco A Thompson DPM;  Location: [facility] OR;  Service: Podiatry;  Laterality: Left;    CARDIAC CATHETERIZATION      CARDIAC DEFIBRILLATOR PLACEMENT  2010, 2016    CARPAL TUNNEL RELEASE  2015    elbow and wrist left arm    FOOT FUSION Left 05/15/2023    Procedure: REMOVAL OF EXTERNAL FIXATOR LEFT LOWER EXTREMITY WITH TIBIAL TALOCALCANEAL ARTHRODESIS  AND ALL INDICATED PROCEDURES;  Surgeon: Marco A Thompson DPM;  Location: [facility] OR;  Service: Podiatry;  Laterality: Left;    FOOT IRRIGATION, DEBRIDEMENT AND REPAIR Left 03/07/2018    Procedure: FOOT IRRIGATION, DEBRIDEMENT AND REPAIR;  Surgeon: Marco A Thompson DPM;  Location: [facility] OR;  Service:     FOOT IRRIGATION, DEBRIDEMENT AND REPAIR Left 03/10/2018    Procedure: FOOT IRRIGATION, DEBRIDEMENT AND REPAIR;  Surgeon: Marco A Thompson DPM;  Location: [facility] OR;  Service: Podiatry    FOOT WOUND CLOSURE Right 03/02/2017    Procedure: INCISION AND DRAINAGE, RIGHT FOOT;  Surgeon: Tung Rosenthal DPM;  Location: [facility] OR;  Service:     HAMMER TOE REPAIR Left 02/15/2018    Procedure: HAMMERTOE CORRECTION LEFT SECOND, THIRD AND FOURTH TOES AND HALLUX INTERPHALANGEAL JOINT ARTHRODESIS LEFT FOOT (Micro Asnis, 4.0 & 5.0 Asnis)      (c-arm);  Surgeon: Marco A Thompson DPM;  Location: [facility] OR;  Service:     HARDWARE REMOVAL Left 03/05/2018    Procedure: ANKLE/FOOT HARDWARE REMOVAL;  Surgeon: Marco A Thompson DPM;  Location: [facility] OR;  Service:     INCISION AND DRAINAGE LEG Left 03/05/2018    Procedure: INCISION AND DRAINAGE LOWER EXTREMITY;  Surgeon: Marco A Thompson DPM;  Location: [facility] OR;  Service:     PLANTAR FASCIA RELEASE Left 11/21/2019    Procedure: PLANTAR PLANING LEFT FOOT AND ALL OTHER INDICATED PROCEDURES;  Surgeon: Marco A Thompson DPM;  Location: [facility] OR;  Service: Podiatry    TOE AMPUTATION Right 2016     TONSILECTOMY, ADENOIDECTOMY, BILATERAL MYRINGOTOMY AND TUBES      age 25     PT Assessment (last 12 hours)       PT Evaluation and Treatment       Row Name 07/18/23 1512          Physical Therapy Time and Intention    Subjective Information no complaints  -TA     Document Type therapy note (daily note)  -TA     Mode of Treatment physical therapy  -TA     Comment pt declined EOB/OOB  -TA       Row Name 07/18/23 1512          General Information    Patient Profile Reviewed yes  -TA     Existing Precautions/Restrictions fall  -TA       Row Name 07/18/23 1512          Pain    Pretreatment Pain Rating 0/10 - no pain  -TA     Posttreatment Pain Rating 0/10 - no pain  -TA       Row Name 07/18/23 1512          Cognition    Orientation Status (Cognition) oriented x 4  -TA     Personal Safety Interventions fall prevention program maintained;gait belt;muscle strengthening facilitated;nonskid shoes/slippers when out of bed;supervised activity  -TA       Row Name 07/18/23 1512          Range of Motion Comprehensive    General Range of Motion other (see comments)  -TA       Row Name 07/18/23 1512          Strength Comprehensive (MMT)    General Manual Muscle Testing (MMT) Assessment other (see comments)  -TA       Row Name 07/18/23 1512          Bed Mobility    Bed Mobility supine-sit;sit-supine;scooting/bridging  -TA     Scooting/Bridging [place] (Bed Mobility) --  bridged using R LE for pressure relief  -TA     Assistive Device (Bed Mobility) bed rails;head of bed elevated  -TA       Row Name 07/18/23 1512          Safety Issues, Functional Mobility    Impairments Affecting Function (Mobility) balance;endurance/activity tolerance;range of motion (ROM);shortness of breath;strength  -TA       Row Name 07/18/23 1512          Hip (Therapeutic Exercise)    Hip (Therapeutic Exercise) AROM (active range of motion);isometric exercises  -TA     Hip AROM (Therapeutic Exercise) bilateral;aBduction;aDduction;external  rotation;internal rotation;supine;15 repititions  -TA     Hip Isometrics (Therapeutic Exercise) bilateral;gluteal sets;supine;10 repetitions;5 repetitions  -TA       Row Name 07/18/23 1512          Knee (Therapeutic Exercise)    Knee (Therapeutic Exercise) AROM (active range of motion);isometric exercises  -TA     Knee AROM (Therapeutic Exercise) bilateral;heel slides;supine;15 repititions  -TA     Knee Isometrics (Therapeutic Exercise) bilateral;quad sets;10 repetitions;5 repetitions  -TA       Row Name 07/18/23 1512          Ankle (Therapeutic Exercise)    Ankle (Therapeutic Exercise) AROM (active range of motion)  -TA     Ankle AROM (Therapeutic Exercise) right;dorsiflexion;plantarflexion;supine;20 repititions  -TA       Row Name             Wound 04/14/23 2157 Left medial gluteal    Wound - Properties Group Placement Date: 04/14/23  -ML Placement Time: 2157  -ML Present on Hospital Admission: Y  -ML, it was passed on in report pt came in with wound, pt stated wound was present on admission  Side: Left  -ML Orientation: medial  -ML Location: gluteal  -ML    Retired Wound - Properties Group Placement Date: 04/14/23  -ML Placement Time: 2157  -ML Present on Hospital Admission: Y  -ML, it was passed on in report pt came in with wound, pt stated wound was present on admission  Side: Left  -ML Orientation: medial  -ML Location: gluteal  -ML    Retired Wound - Properties Group Date first assessed: 04/14/23  -ML Time first assessed: 2157  -ML Present on Hospital Admission: Y  -ML, it was passed on in report pt came in with wound, pt stated wound was present on admission  Side: Left  -ML Location: gluteal  -ML      Row Name             Wound 05/15/23 1546 Left anterior foot Incision    Wound - Properties Group Placement Date: 05/15/23  -HP Placement Time: 1546  -HP Present on Hospital Admission: Y  -HP Side: Left  -HP Orientation: anterior  -HP Location: foot  -HP Primary Wound Type: Incision  -HP Additional Comments:  adaptic, bacitracin ointment, 4x4, abd, webril, ace, boot  -HP    Retired Wound - Properties Group Placement Date: 05/15/23  -HP Placement Time: 1546  -HP Present on Hospital Admission: Y  -HP Side: Left  -HP Orientation: anterior  -HP Location: foot  -HP Primary Wound Type: Incision  -HP Additional Comments: adaptic, bacitracin ointment, 4x4, abd, webril, ace, boot  -HP    Retired Wound - Properties Group Date first assessed: 05/15/23  -HP Time first assessed: 1546  -HP Present on Hospital Admission: Y  -HP Side: Left  -HP Location: foot  -HP Primary Wound Type: Incision  -HP Additional Comments: adaptic, bacitracin ointment, 4x4, abd, webril, ace, boot  -HP      Row Name             Wound 06/03/23 0920 Left lower leg    Wound - Properties Group Placement Date: 06/03/23  -LW Placement Time: 0920  -LW Side: Left  -LW Orientation: lower  -LW Location: leg  -LW    Retired Wound - Properties Group Placement Date: 06/03/23  -LW Placement Time: 0920 -LW Side: Left  -LW Orientation: lower  -LW Location: leg  -LW    Retired Wound - Properties Group Date first assessed: 06/03/23  -LW Time first assessed: 0920  -LW Side: Left  -LW Location: leg  -LW      Row Name             Wound 06/03/23 0922 Left posterior heel    Wound - Properties Group Placement Date: 06/03/23  -LW Placement Time: 0922 -LW Side: Left  -LW Orientation: posterior  -LW Location: heel  -LW    Retired Wound - Properties Group Placement Date: 06/03/23  -LW Placement Time: 0922 -LW Side: Left  -LW Orientation: posterior  -LW Location: heel  -LW    Retired Wound - Properties Group Date first assessed: 06/03/23  -LW Time first assessed: 0922  -LW Side: Left  -LW Location: heel  -LW      Row Name             Wound 06/03/23 0923 Left anterior ankle    Wound - Properties Group Placement Date: 06/03/23  -LW Placement Time: 0923 -LW Side: Left  -LW Orientation: anterior  -LW Location: ankle  -LW    Retired Wound - Properties Group Placement Date: 06/03/23  -LW  Placement Time: 0923 -LW Side: Left  -LW Orientation: anterior  -LW Location: ankle  -LW    Retired Wound - Properties Group Date first assessed: 06/03/23  -LW Time first assessed: 0923 -LW Side: Left  -LW Location: ankle  -LW      Row Name 07/18/23 1512          Plan of Care Review    Plan of Care Reviewed With patient  -TA     Outcome Evaluation pt defered EOB/OOB. pt performed AROM B LE exercises in supine  -TA       Row Name 07/18/23 1512          Vital Signs    Pre Systolic BP Rehab 126  -TA     Pre Treatment Diastolic BP 71  -TA     Post Systolic BP Rehab 112  -TA     Post Treatment Diastolic BP 66  -TA     Pretreatment Heart Rate (beats/min) 71  -TA     Posttreatment Heart Rate (beats/min) 72  -TA     Pre SpO2 (%) 99  -TA     O2 Delivery Pre Treatment room air  -TA     Post SpO2 (%) 98  -TA     O2 Delivery Post Treatment room air  -TA     Pre Patient Position Supine  -TA     Intra Patient Position Supine  -TA     Post Patient Position Supine  -TA       Row Name 07/18/23 1512          Positioning and Restraints    Pre-Treatment Position in bed  -TA     Post Treatment Position bed  -TA     In Bed supine;call light within reach;exit alarm on  -TA       Row Name 07/18/23 1512          Therapy Assessment/Plan (PT)    Rehab Potential (PT) fair, will monitor progress closely  -TA     Criteria for Skilled Interventions Met (PT) yes;skilled treatment is necessary  -TA     Therapy Frequency (PT) other (see comments)  5-7 days/week  -TA     Comment, Therapy Assessment/Plan (PT) continue  -TA       Row Name 07/18/23 1512          Bed Mobility Goal 1 (PT)    Activity/Assistive Device (Bed Mobility Goal 1, PT) sit to supine/supine to sit  -TA     [place] Level/Cues Needed (Bed Mobility Goal 1, PT) independent  -TA     Time Frame (Bed Mobility Goal 1, PT) 30 days  -TA     Progress/Outcomes (Bed Mobility Goal 1, PT) goal not met  -TA       Row Name 07/18/23 1512          Transfer Goal 1 (PT)    Activity/Assistive  Device (Transfer Goal 1, PT) bed-to-chair/chair-to-bed;sit-to-stand/stand-to-sit;walker, rolling  -TA     [place] Level/Cues Needed (Transfer Goal 1, PT) modified independence  -TA     Time Frame (Transfer Goal 1, PT) by discharge  -TA     Progress/Outcome (Transfer Goal 1, PT) goal not met  -TA       Row Name 07/18/23 1512          Gait Training Goal 1 (PT)    Activity/Assistive Device (Gait Training Goal 1, PT) walker, rolling  -TA     [place] Level (Gait Training Goal 1, PT) modified independence  -TA     Distance (Gait Training Goal 1, PT) 25' X 2  -TA     Time Frame (Gait Training Goal 1, PT) by discharge  -TA     Progress/Outcome (Gait Training Goal 1, PT) goal not met  -TA       Row Name 07/18/23 1512          Stairs Goal 1 (PT)    Activity/Assistive Device (Stairs Goal 1, PT) ascending stairs;descending stairs  -TA     [place] Level/Cues Needed (Stairs Goal 1, PT) independent  -TA     Number of Stairs (Stairs Goal 1, PT) 4  -TA     Time Frame (Stairs Goal 1, PT) by discharge  -TA     Progress/Outcome (Stairs Goal 1, PT) goal not met  -TA               User Key  (r) = Recorded By, (t) = Taken By, (c) = Cosigned By      Initials Name Provider Type    Jennifer Diallo, RN Registered Nurse    Magda Jimenez, PTA Physical Therapist Assistant    Maryann De La Cruz RN Registered Nurse    Charley Chowdary RN Registered Nurse                    Physical Therapy Education       Title: PT OT SLP Therapies (In Progress)       Topic: Physical Therapy (In Progress)       Point: Mobility training (In Progress)       Learning Progress Summary             Patient Acceptance, E, NR by MM at 7/17/2023 1150    Comment: PT POC and goals. Proper hand positioning for transfers, proper use of FWW for safety.                         Point: Home exercise program (Not Started)       Learner Progress:  Not documented in this visit.              Point: Body mechanics (Not Started)       Learner Progress:  Not  documented in this visit.              Point: Precautions (Not Started)       Learner Progress:  Not documented in this visit.                              User Key       Initials Effective Dates Name Provider Type Discipline     06/26/23 -  Joelle Doan, PT Physical Therapist PT                  PT Recommendation and Plan  Anticipated Discharge Disposition (PT): home with home health, home with assist  Therapy Frequency (PT): other (see comments) (5-7 days/week)  Plan of Care Reviewed With: patient  Outcome Evaluation: pt defered EOB/OOB. pt performed AROM B LE exercises in supine   Outcome Measures       Row Name 07/18/23 1600 07/17/23 0906          How much help from another person do you currently need...    Turning from your back to your side while in flat bed without using bedrails? 3  -TA --     Moving from lying on back to sitting on the side of a flat bed without bedrails? 3  -TA --     Moving to and from a bed to a chair (including a wheelchair)? 3  -TA --     Standing up from a chair using your arms (e.g., wheelchair, bedside chair)? 3  -TA --     Climbing 3-5 steps with a railing? 2  -TA --     To walk in hospital room? 2  -TA --     AM-PAC 6 Clicks Score (PT) 16  -TA --        How much help from another is currently needed...    Putting on and taking off regular lower body clothing? -- 2  -RB     Bathing (including washing, rinsing, and drying) -- 2  -RB     Toileting (which includes using toilet bed pan or urinal) -- 2  -RB     Putting on and taking off regular upper body clothing -- 3  -RB     Taking care of personal grooming (such as brushing teeth) -- 4  -RB     Eating meals -- 4  -RB     AM-PAC 6 Clicks Score (OT) -- 17  -RB        Functional Assessment    Outcome Measure Options AM-PAC 6 Clicks Basic Mobility (PT)  -TA AM-PAC 6 Clicks Daily Activity (OT)  -RB               User Key  (r) = Recorded By, (t) = Taken By, (c) = Cosigned By      Initials Name Provider Type    RB Sincere Dyer,  OT Occupational Therapist    Magda Jimenez PTA Physical Therapist Assistant                     Time Calculation:    PT Charges       Row Name 07/18/23 1618             Time Calculation    Start Time 1512  -TA      Stop Time 1601  -TA      Time Calculation (min) 49 min  -TA      PT Received On 07/18/23  -TA         Time Calculation- PT    Total Timed Code Minutes- PT 49 minute(s)  -TA         Timed Charges    98863 - PT Therapeutic Exercise Minutes 34  -TA      20981 - PT Therapeutic Activity Minutes 15  -TA         Total Minutes    Timed Charges Total Minutes 49  -TA       Total Minutes 49  -TA                User Key  (r) = Recorded By, (t) = Taken By, (c) = Cosigned By      Initials Name Provider Type    Magda Jimenez PTA Physical Therapist Assistant                  Therapy Charges for Today       Code Description Service Date Service Provider Modifiers Qty    43162501494 HC PT THER PROC EA 15 MIN 7/18/2023 Magda Rader PTA GP 2    81304208939 HC PT THERAPEUTIC ACT EA 15 MIN 7/18/2023 Magda Rader PTA GP 1            PT G-Codes  Outcome Measure Options: AM-PAC 6 Clicks Basic Mobility (PT)  AM-PAC 6 Clicks Score (PT): 16  AM-PAC 6 Clicks Score (OT): 17    Magda Rader PTA  7/18/2023

## 2023-07-18 NOTE — PLAN OF CARE
Goal Outcome Evaluation:  Plan of Care Reviewed With: patient           Outcome Evaluation: pt defered EOB/OOB. pt performed AROM B LE exercises in supine      Anticipated Discharge Disposition (PT): home with home health, home with assist

## 2023-07-18 NOTE — THERAPY TREATMENT NOTE
Patient Name: Emre Lisa  : 1960    MRN: [id number]                              Today's Date: 2023       Admit Date: 2023    Visit Dx:     ICD-10-CM ICD-9-CM   1. Fever of unknown origin  R50.9 780.60   2. Sepsis due to other etiology  A41.89 038.8   3. Impaired functional mobility, balance, gait, and endurance [Z74.09 (ICD-10-CM)]  Z74.09 V49.89   4. Impaired mobility and activities of daily living [Z74.09, Z78.9 (ICD-10-CM)]  Z74.09 V49.89    Z78.9      Patient Active Problem List   Diagnosis    Foot osteomyelitis, right    Nodule of groin    Type 2 diabetes mellitus with skin complication    Status post transmetatarsal amputation of right foot    Hammer toe of left foot    Hallux malleus of left foot    Cellulitis of left foot    Infected hardware in left leg    Chronic multifocal osteomyelitis of left foot    Charcot's joint of left foot    Skin ulcer of left foot, limited to breakdown of skin    Dyspnea    Syncope    Acute respiratory failure with hypoxia    Tachycardia    Neuropathy due to secondary diabetes mellitus    Atrial fibrillation    Presence of biventricular cardiac pacemaker    Hypertension    Medically complex patient    Fever of unknown origin    Bacteremia    Primary osteoarthritis of knees, bilateral    Sepsis    Fever, unknown origin    Septicemia due to Enterobacter     Past Medical History:   Diagnosis Date    Arthritis     Atrial fibrillation     Diabetes mellitus     Diabetic foot ulcer associated with type 2 diabetes mellitus     left great toe    Hallux malleus of left foot     Hammer toe     Hypertension     MI (myocardial infarction)     Neuropathy in diabetes     Onychomycosis     Presence of biventricular cardiac pacemaker     Rheumatoid arthritis      Past Surgical History:   Procedure Laterality Date    AMPUTATION DIGIT Right 2017    Procedure: RIGHT AMPUTATION TRANSMETATRASAL , RECESSION GASTROCNEMOUS;  Surgeon: Marco A Thompson DPM;  Location:   [facility] OR;  Service:     ANKLE FUSION Left 04/13/2023    Procedure: REDUCTION OF LEFT ANKLE DEFORMITY WITH APPLICATION OF EXTERNAL FIXATOR;  Surgeon: Marco A Thompson DPM;  Location: [facility] OR;  Service: Podiatry;  Laterality: Left;    CARDIAC CATHETERIZATION      CARDIAC DEFIBRILLATOR PLACEMENT  2010, 2016    CARPAL TUNNEL RELEASE  2015    elbow and wrist left arm    FOOT FUSION Left 05/15/2023    Procedure: REMOVAL OF EXTERNAL FIXATOR LEFT LOWER EXTREMITY WITH TIBIAL TALOCALCANEAL ARTHRODESIS  AND ALL INDICATED PROCEDURES;  Surgeon: Marco A Thompson DPM;  Location: [facility] OR;  Service: Podiatry;  Laterality: Left;    FOOT IRRIGATION, DEBRIDEMENT AND REPAIR Left 03/07/2018    Procedure: FOOT IRRIGATION, DEBRIDEMENT AND REPAIR;  Surgeon: Marco A Thompson DPM;  Location: [facility] OR;  Service:     FOOT IRRIGATION, DEBRIDEMENT AND REPAIR Left 03/10/2018    Procedure: FOOT IRRIGATION, DEBRIDEMENT AND REPAIR;  Surgeon: Marco A Thompson DPM;  Location: [facility] OR;  Service: Podiatry    FOOT WOUND CLOSURE Right 03/02/2017    Procedure: INCISION AND DRAINAGE, RIGHT FOOT;  Surgeon: Tung Rosenthal DPM;  Location: [facility] OR;  Service:     HAMMER TOE REPAIR Left 02/15/2018    Procedure: HAMMERTOE CORRECTION LEFT SECOND, THIRD AND FOURTH TOES AND HALLUX INTERPHALANGEAL JOINT ARTHRODESIS LEFT FOOT (Micro Asnis, 4.0 & 5.0 Asnis)      (c-arm);  Surgeon: Marco A Thompson DPM;  Location: [facility] OR;  Service:     HARDWARE REMOVAL Left 03/05/2018    Procedure: ANKLE/FOOT HARDWARE REMOVAL;  Surgeon: Marco A Thompson DPM;  Location: [facility] OR;  Service:     INCISION AND DRAINAGE LEG Left 03/05/2018    Procedure: INCISION AND DRAINAGE LOWER EXTREMITY;  Surgeon: Marco A Thompson DPM;  Location: [facility] OR;  Service:     PLANTAR FASCIA RELEASE Left 11/21/2019    Procedure: PLANTAR PLANING LEFT FOOT AND ALL OTHER INDICATED PROCEDURES;  Surgeon: Marco A Thompson DPM;  Location: [facility] OR;  Service: Podiatry    TOE AMPUTATION Right 2016     TONSILECTOMY, ADENOIDECTOMY, BILATERAL MYRINGOTOMY AND TUBES      age 25      General Information       Row Name 07/18/23 0952          OT Time and Intention    Document Type therapy note (daily note)  -BB     Mode of Treatment occupational therapy;individual therapy  -BB       Row Name 07/18/23 0952          General Information    Patient Profile Reviewed yes  -BB     Existing Precautions/Restrictions fall  -BB       Row Name 07/18/23 0952          Cognition    Orientation Status (Cognition) oriented x 4  -BB       Row Name 07/18/23 0952          Safety Issues, Functional Mobility    Impairments Affecting Function (Mobility) balance;endurance/activity tolerance;strength;shortness of breath;range of motion (ROM)  -BB               User Key  (r) = Recorded By, (t) = Taken By, (c) = Cosigned By      Initials Name Provider Type    BB Paola Fortune COTA Occupational Therapist Assistant                     Mobility/ADL's       Row Name 07/18/23 0952          Bed Mobility    Bed Mobility supine-sit;sit-supine  -BB     Sit-Supine [place] (Bed Mobility) supervision  -BB     Assistive Device (Bed Mobility) bed rails;head of bed elevated  -BB       Row Name 07/18/23 0952          Transfers    Transfers sit-stand transfer  -BB       Row Name 07/18/23 0952          Sit-Stand Transfer    Assistive Device (Sit-Stand Transfers) walker, front-wheeled  -BB       Row Name 07/18/23 0952          Functional Mobility    Functional Mobility- Device walker, front-wheeled  -BB       Row Name 07/18/23 0952          Activities of Daily Living    BADL Assessment/Intervention bathing;upper body dressing;lower body dressing;grooming  -       Row Name 07/18/23 0952          Bathing Assessment/Intervention    [place] Level (Bathing) upper body;modified independence;lower body;minimum assist (75% patient effort)  -BB     Assistive Devices (Bathing) long-handled sponge  -BB     Position (Bathing) long sitting  -BB       Row Name  07/18/23 0952          Upper Body Dressing Assessment/Training    [place] Level (Upper Body Dressing) doff;don;contact guard assist  hospital gown  -BB     Position (Upper Body Dressing) long sitting  -BB       Row Name 07/18/23 0952          Lower Body Dressing Assessment/Training    [place] Level (Lower Body Dressing) unable to assess  -BB       Row Name 07/18/23 0952          Grooming Assessment/Training    [place] Level (Grooming) hair care, combing/brushing;oral care regimen;wash face, hands;standby assist  -BB     Position (Grooming) sitting up in bed  -BB               User Key  (r) = Recorded By, (t) = Taken By, (c) = Cosigned By      Initials Name Provider Type    Paola Terrell COTA Occupational Therapist Assistant                   Obj/Interventions       Row Name 07/18/23 0952          Range of Motion Comprehensive    General Range of Motion bilateral upper extremity ROM WNL  -BB       [facility] Name 07/18/23 0952          Strength Comprehensive (MMT)    General Manual Muscle Testing (MMT) Assessment other (see comments)  -BB               User Key  (r) = Recorded By, (t) = Taken By, (c) = Cosigned By      Initials Name Provider Type    Paola Terrell COTA Occupational Therapist Assistant                   Goals/Plan       Row Name 07/18/23 0952          Transfer Goal 1 (OT)    Activity/Assistive Device (Transfer Goal 1, OT) transfers, all  -BB     [place] Level/Cues Needed (Transfer Goal 1, OT) modified independence  -BB     Time Frame (Transfer Goal 1, OT) long term goal (LTG)  -BB     Progress/Outcome (Transfer Goal 1, OT) goal not met  -BB       Row Name 07/18/23 0952          Bathing Goal 1 (OT)    Activity/Device (Bathing Goal 1, OT) bathing skills, all  -BB     [place] Level/Cues Needed (Bathing Goal 1, OT) supervision required  -BB     Time Frame (Bathing Goal 1, OT) long term goal (LTG)  -BB     Progress/Outcomes (Bathing Goal 1, OT) goal not met  -BB        Row Name 07/18/23 0952          Dressing Goal 1 (OT)    Activity/Device (Dressing Goal 1, OT) dressing skills, all  -BB     [place]/Cues Needed (Dressing Goal 1, OT) supervision required  -BB     Time Frame (Dressing Goal 1, OT) long term goal (LTG)  -BB     Progress/Outcome (Dressing Goal 1, OT) goal not met  -BB       Row Name 07/18/23 0952          Toileting Goal 1 (OT)    Activity/Device (Toileting Goal 1, OT) toileting skills, all  -BB     [place] Level/Cues Needed (Toileting Goal 1, OT) modified independence  -BB     Time Frame (Toileting Goal 1, OT) long term goal (LTG)  -BB     Progress/Outcome (Toileting Goal 1, OT) goal not met  -BB               User Key  (r) = Recorded By, (t) = Taken By, (c) = Cosigned By      Initials Name Provider Type    BB Paola Fortune COTA Occupational Therapist Assistant                   Clinical Impression       Row Name 07/18/23 0952          Pain Assessment    Pretreatment Pain Rating 6/10  -BB     Posttreatment Pain Rating 5/10  -BB     Pain Intervention(s) Medication (See MAR)  -BB       Row Name 07/18/23 0952          Plan of Care Review    Plan of Care Reviewed With patient  -BB     Progress no change  -BB     Outcome Evaluation Pt just returned to room and defers EOB/OOB. Pt is Mod I for UB bathing, CGA for UB dressing, Min A for LB bathing with LH sponge. Pt would benefit from continued OT services.  -BB       Row Name 07/18/23 0952          Therapy Assessment/Plan (OT)    Rehab Potential (OT) good, to achieve stated therapy goals  -BB     Criteria for Skilled Therapeutic Interventions Met (OT) yes;meets criteria  -BB     Therapy Frequency (OT) other (see comments)  5-7 days/wk  -BB       Row Name 07/18/23 0952          Therapy Plan Review/Discharge Plan (OT)    Anticipated Discharge Disposition (OT) home with assist;home with 24/7 care;home with home health  -BB       Row Name 07/18/23 0952          Vital Signs    Pretreatment Heart Rate (beats/min)  100  -BB     Pre SpO2 (%) 93  -BB     O2 Delivery Pre Treatment supplemental O2  -BB     Pre Patient Position Supine  -BB       Row Name 07/18/23 0952          Positioning and Restraints    Pre-Treatment Position in bed  -BB     Post Treatment Position bed  -BB     In Bed fowlers;call light within reach;encouraged to call for assist;exit alarm on  -BB               User Key  (r) = Recorded By, (t) = Taken By, (c) = Cosigned By      Initials Name Provider Type    Paola Terrell COTA Occupational Therapist Assistant                   Outcome Measures       Row Name 07/18/23 0952          How much help from another is currently needed...    Putting on and taking off regular lower body clothing? 2  -BB     Bathing (including washing, rinsing, and drying) 2  -BB     Toileting (which includes using toilet bed pan or urinal) 2  -BB     Putting on and taking off regular upper body clothing 3  -BB     Taking care of personal grooming (such as brushing teeth) 4  -BB     Eating meals 4  -BB     AM-PAC 6 Clicks Score (OT) 17  -BB       Row Name 07/18/23 0800          How much help from another person do you currently need...    Turning from your back to your side while in flat bed without using bedrails? 3  -RE     Moving from lying on back to sitting on the side of a flat bed without bedrails? 3  -RE     Moving to and from a bed to a chair (including a wheelchair)? 3  -RE     Standing up from a chair using your arms (e.g., wheelchair, bedside chair)? 3  -RE     Climbing 3-5 steps with a railing? 2  -RE     To walk in hospital room? 2  -RE     AM-PAC 6 Clicks Score (PT) 16  -RE     Highest level of mobility 5 --> Static standing  -RE               User Key  (r) = Recorded By, (t) = Taken By, (c) = Cosigned By      Initials Name Provider Type    Jenn Salomon RN Registered Nurse    Paola Terrell COTA Occupational Therapist Assistant                    Occupational Therapy Education       Title: PT OT  SLP Therapies (In Progress)       Topic: Occupational Therapy (In Progress)       Point: ADL training (Done)       Description:   Instruct learner(s) on proper safety adaptation and remediation techniques during self care or transfers.   Instruct in proper use of assistive devices.                  Learning Progress Summary             Patient Acceptance, E,TB, VU by BB at 7/18/2023 1322                         Point: Home exercise program (Not Started)       Description:   Instruct learner(s) on appropriate technique for monitoring, assisting and/or progressing therapeutic exercises/activities.                  Learner Progress:  Not documented in this visit.              Point: Precautions (Done)       Description:   Instruct learner(s) on prescribed precautions during self-care and functional transfers.                  Learning Progress Summary             Patient Acceptance, E, VU by RB at 7/17/2023 1317    Comment: Edu pt on use of gait belt and non skid socks when OOB and no OOB without assist.                         Point: Body mechanics (Done)       Description:   Instruct learner(s) on proper positioning and spine alignment during self-care, functional mobility activities and/or exercises.                  Learning Progress Summary             Patient Acceptance, E,TB, VU by BB at 7/18/2023 1322                                         User Key       Initials Effective Dates Name Provider Type Discipline     07/11/23 -  Sincere Dyer, OT Occupational Therapist OT    BB 06/16/21 -  Paola Fortune COTA Occupational Therapist Assistant OT                  OT Recommendation and Plan  Therapy Frequency (OT): other (see comments) (5-7 days/wk)  Plan of Care Review  Plan of Care Reviewed With: patient  Progress: no change  Outcome Evaluation: Pt just returned to room and defers EOB/OOB. Pt is Mod I for UB bathing, CGA for UB dressing, Min A for LB bathing with LH sponge. Pt would benefit from continued OT  services.     Time Calculation:         Time Calculation- OT       Row Name 07/18/23 1322             Time Calculation- OT    OT Start Time 0952  -BB      OT Stop Time 1046  -BB      OT Time Calculation (min) 54 min  -BB      Total Timed Code Minutes- OT 54 minute(s)  -BB      OT Received On 07/18/23  -         Timed Charges    94998 - OT Self Care/Mgmt Minutes 54  -BB         Total Minutes    Timed Charges Total Minutes 54  -BB       Total Minutes 54  -BB                User Key  (r) = Recorded By, (t) = Taken By, (c) = Cosigned By      Initials Name Provider Type    Paola Terrell COTA Occupational Therapist Assistant                  Therapy Charges for Today       Code Description Service Date Service Provider Modifiers Qty    [id number] HC OT SELF CARE/MGMT/TRAIN EA 15 MIN 7/18/2023 Paola Fortune COTA GO 4                 ZIGGY Gage  7/18/2023

## 2023-07-18 NOTE — PROGRESS NOTES
Podiatric Surgery Progress Note    Subjective   Patient feeling better today. NAD    Objective     Vital signs in last 24 hours:  Temp:  [97.5 °F (36.4 °C)-100.1 °F (37.8 °C)] 97.8 °F (36.6 °C)  Heart Rate:  [] 73  Resp:  [16-19] 19  BP: (107-133)/(56-71) 133/71    General: alert, appears stated age, and cooperative   Neurovascular: SILT  Capillary refill: Normal   Wound: No open wounds    Range of Motion: Limited dorsiflexion, Limited plantarflexion, Limited inversion, and Limited eversion   DVT Exam: No evidence of DVT seen on physical exam.     Data Review  CBC:  Results from last 7 days   Lab Units 07/18/23  0318   WBC 10*3/mm3 2.99*   RBC 10*6/mm3 3.37*   HEMOGLOBIN g/dL 9.5*   HEMATOCRIT % 28.8*   PLATELETS 10*3/mm3 80*       Assessment & Plan     Bacteremia     Sepsis of unknown source. Thought to be from the LLE. Ordered urinalysis and urine culture. Continue IV antibiotics. No surgical intervention planned. Call with questions.      LOS: 1 day     Marco A Thompson DPM    Date: 7/18/2023  Time: 16:41 CDT

## 2023-07-18 NOTE — PLAN OF CARE
Goal Outcome Evaluation:  Plan of Care Reviewed With: patient        Progress: no change  Outcome Evaluation: Pt just returned to room and defers EOB/OOB. Pt is Mod I for UB bathing, CGA for UB dressing, Min A for LB bathing with LH sponge. Pt would benefit from continued OT services.      Anticipated Discharge Disposition (OT): home with assist, home with 24/7 care, home with home health

## 2023-07-18 NOTE — PROGRESS NOTES
[facility] Medicine   INPATIENT PROGRESS NOTE      Patient Name: Emre Lisa  Date of Admission: 7/16/2023  Today's Date: 07/18/23  Length of Stay: 1  Primary Care Physician: Jamaal Bravo MD    Subjective     HPI   63-year-old male with diabetes mellitus with chronic left foot ulcer and osteomyelitis of left foot, Charcot joint, s/p removal of external fixator with tibial talocalcaneal arthrodesis in May 2023, followed by podiatry with LLE wrapped in compression boot, recently hospitalized with sepsis, fever with Enterobacter bacteremia treated with Zosyn.  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    He presented back after developing fevers/chills, had sinus tachycardia, lactic acidosis, elevated CRP, admitted with sepsis.  Empirically treated with broad-spectrum antibiotics and fluid resuscitation.   There was no clear evidence of superficial soft tissue infection, although x-rays of lower extremity did show soft tissue swelling, otherwise with previous fusion with intact hardware.   He was subsequently found to have recurrence of Enterobacter cloace bacteremia.  Given known issues with chronic osteomyelitis and significantly elevated CRP, most likely source of bacteremia would be related to this.  Vancomycin discontinued and continued on IV cefepime pending further culture sensitivities.      Had persistent fevers yesterday however no fevers this a.m. he appears more comfortable today.  Less chills.      Review of Systems   Comprehensive ROS completed  All pertinent negatives and positives are as above. All other systems have been reviewed and are negative unless otherwise stated.     Objective    Temp:  [97.5 °F (36.4 °C)-100.1 °F (37.8 °C)] 97.5 °F (36.4 °C)  Heart  Rate:  [] 72  Resp:  [16-18] 16  BP: (107-117)/(56-66) 114/65  Physical Exam  General: No acute distress  Cardiac: Normal rate, regular rhythm  Respiratory: No wheezes rales or rhonchi  Abdomen: Nontender, bowel sounds present  Extremities: Left foot edema with Charcot joint, hammertoe      Results Review:  I have reviewed the labs, radiology results, and diagnostic studies.    Laboratory Data:   Results from last 7 days   Lab Units 07/18/23 0318 07/17/23  0535 07/16/23  0913   WBC 10*3/mm3 2.99* 4.49 5.43   HEMOGLOBIN g/dL 9.5* 10.4* 11.3*   HEMATOCRIT % 28.8* 31.5* 33.9*   PLATELETS 10*3/mm3 80* 91* 112*          Results from last 7 days   Lab Units 07/18/23 0317 07/17/23  0535 07/16/23  0913   SODIUM mmol/L 138 136 133*   POTASSIUM mmol/L 3.8 3.8 4.1   CHLORIDE mmol/L 104 103 96*   CO2 mmol/L 25.0 24.0 20.0*   BUN mg/dL 17 17 28*   CREATININE mg/dL 0.82 0.93 1.16   CALCIUM mg/dL 8.3* 8.2* 8.5*   BILIRUBIN mg/dL  --   --  1.3*   ALK PHOS U/L  --   --  106   ALT (SGPT) U/L  --   --  23   AST (SGOT) U/L  --   --  17   GLUCOSE mg/dL 157* 125* 242*         Culture Data:   Blood Culture   Date Value Ref Range Status   07/16/2023 No growth at 2 days  Preliminary   07/16/2023 Abnormal Stain (C)  Preliminary     No results found for: URINECX  No results found for: RESPCX  No results found for: WOUNDCX  No results found for: STOOLCX  No components found for: BODYFLD    Radiology Data:   Imaging Results (Last 24 Hours)       Procedure Component Value Units Date/Time    XR Foot 3+ View Left [[id number]] Collected: 07/17/23 1044     Updated: 07/17/23 1746    Narrative:      HISTORY:  Foot pain.    VIEWS:  Frontal, lateral and oblique images of the left foot were obtained.    FINDINGS:  There is marked fragmentation of the tarsal bones which is similar to the  patient's prior study.  There is complete collapse of the talus.  Multiple bone  fragments are noted about the calcaneus and midfoot.  There are  advanced  degenerative changes involving the metatarsophalangeal joints.  There are  advanced degenerative changes involving the metatarsotarsal joints.    There is a new deformity of the base of the proximal phalanx of the fifth digit.  This is most likely secondary to a fracture.      Impression:      1.  Advanced Charcot arthropathy of the left foot which is similar to the  patient's prior study.    2.  Probable fracture involving the base of the proximal phalanx of the fifth  digit.            I have reviewed the patient's current medications.     Assessment/Plan     Active Hospital Problems    Diagnosis     **Septicemia due to Enterobacter     Sepsis     Fever, unknown origin        Sepsis, most likely infectious source recurrent chronic osteomyelitis, markedly elevated CRP, other possibility could be septic joint or infected hardware, however hardware appears intact on x-rays  ---In any event, it would require weeks of IV antibiotics, would lean towards completing 6 weeks of IV antibiotics for more definitive treatment of likely recurrent osteomyelitis  --Recurrent Enterobacter bacteremia without source control  --Repeat blood culture to ensure clearance  - Unable to obtain MRI as unable to verify device compatibility  --D/C vancomycin  - Continue on IV cefepime and await final blood culture sensitivities  --Follow fever curve  --Lactic acidosis improved with fluids  --Podiatry following  -- Continue skin care    Chronic HFrEF, EF 30 to 35% on echo in June, AICD present  Cautious with any further fluid resuscitation    A-fib  Monitor telemetry  Continue Multaq for rhythm control  Rate control on metoprolol twice daily  Anticoagulated on Eliquis     History of DVT/PE  Anticoagulated on Eliquis        PT/OT     Status and plan of care extensively discussed with patient.  He would benefit from being followed by infectious diseases specialist.  Preference would be referral to ID in [place].  Very high likelihood  of patient needing prolonged course of IV antibiotics.  Even if oral antibiotics were an option pending his clinical course, higher likelihood of failure rate.      Discharge Planning: Possible discharge home later this week with home health and likely need for PICC line with IV antibiotics, awaiting culture sensitivities.      Total care time of 52 minutes including patient evaluation, chart review, development of management plan, discussion of test results, status and plan of care with patient as well as nursing staff and case management.    Electronically signed by Chente Ross MD, 07/18/23, 14:57 CDT.

## 2023-07-18 NOTE — PLAN OF CARE
Goal Outcome Evaluation:  Plan of Care Reviewed With: patient           Outcome Evaluation: Pt requested stool softner; order received for stool softner and administered to pt. No new complaints at this time.

## 2023-07-19 ENCOUNTER — APPOINTMENT (OUTPATIENT)
Dept: CT IMAGING | Facility: HOSPITAL | Age: 63
DRG: 872 | End: 2023-07-19
Payer: MEDICARE

## 2023-07-19 LAB
ANION GAP SERPL CALCULATED.3IONS-SCNC: 9 MMOL/L (ref 5–15)
BASOPHILS # BLD AUTO: 0.02 10*3/MM3 (ref 0–0.2)
BASOPHILS NFR BLD AUTO: 0.8 % (ref 0–1.5)
BUN SERPL-MCNC: 14 MG/DL (ref 8–23)
BUN/CREAT SERPL: 15.2 (ref 7–25)
CALCIUM SPEC-SCNC: 8.6 MG/DL (ref 8.6–10.5)
CHLORIDE SERPL-SCNC: 103 MMOL/L (ref 98–107)
CO2 SERPL-SCNC: 26 MMOL/L (ref 22–29)
CREAT SERPL-MCNC: 0.92 MG/DL (ref 0.76–1.27)
DEPRECATED RDW RBC AUTO: 51.3 FL (ref 37–54)
EGFRCR SERPLBLD CKD-EPI 2021: 94.6 ML/MIN/1.73
EOSINOPHIL # BLD AUTO: 0.11 10*3/MM3 (ref 0–0.4)
EOSINOPHIL NFR BLD AUTO: 4.3 % (ref 0.3–6.2)
ERYTHROCYTE [DISTWIDTH] IN BLOOD BY AUTOMATED COUNT: 16.4 % (ref 12.3–15.4)
GLUCOSE BLDC GLUCOMTR-MCNC: 196 MG/DL (ref 70–130)
GLUCOSE BLDC GLUCOMTR-MCNC: 200 MG/DL (ref 70–130)
GLUCOSE BLDC GLUCOMTR-MCNC: 216 MG/DL (ref 70–130)
GLUCOSE BLDC GLUCOMTR-MCNC: 225 MG/DL (ref 70–130)
GLUCOSE SERPL-MCNC: 174 MG/DL (ref 65–99)
HCT VFR BLD AUTO: 31.3 % (ref 37.5–51)
HGB BLD-MCNC: 10.3 G/DL (ref 13–17.7)
IMM GRANULOCYTES # BLD AUTO: 0.01 10*3/MM3 (ref 0–0.05)
IMM GRANULOCYTES NFR BLD AUTO: 0.4 % (ref 0–0.5)
LYMPHOCYTES # BLD AUTO: 0.89 10*3/MM3 (ref 0.7–3.1)
LYMPHOCYTES NFR BLD AUTO: 34.9 % (ref 19.6–45.3)
MCH RBC QN AUTO: 28.1 PG (ref 26.6–33)
MCHC RBC AUTO-ENTMCNC: 32.9 G/DL (ref 31.5–35.7)
MCV RBC AUTO: 85.3 FL (ref 79–97)
MONOCYTES # BLD AUTO: 0.4 10*3/MM3 (ref 0.1–0.9)
MONOCYTES NFR BLD AUTO: 15.7 % (ref 5–12)
NEUTROPHILS NFR BLD AUTO: 1.12 10*3/MM3 (ref 1.7–7)
NEUTROPHILS NFR BLD AUTO: 43.9 % (ref 42.7–76)
NRBC BLD AUTO-RTO: 0 /100 WBC (ref 0–0.2)
PLATELET # BLD AUTO: 108 10*3/MM3 (ref 140–450)
PMV BLD AUTO: 10.3 FL (ref 6–12)
POTASSIUM SERPL-SCNC: 3.8 MMOL/L (ref 3.5–5.2)
RBC # BLD AUTO: 3.67 10*6/MM3 (ref 4.14–5.8)
SODIUM SERPL-SCNC: 138 MMOL/L (ref 136–145)
VIT B12 BLD-MCNC: 347 PG/ML (ref 211–946)
WBC NRBC COR # BLD: 2.55 10*3/MM3 (ref 3.4–10.8)

## 2023-07-19 PROCEDURE — 97530 THERAPEUTIC ACTIVITIES: CPT

## 2023-07-19 PROCEDURE — 25510000001 IOPAMIDOL 61 % SOLUTION: Performed by: STUDENT IN AN ORGANIZED HEALTH CARE EDUCATION/TRAINING PROGRAM

## 2023-07-19 PROCEDURE — 97110 THERAPEUTIC EXERCISES: CPT

## 2023-07-19 PROCEDURE — 25010000002 MEROPENEM PER 100 MG

## 2023-07-19 PROCEDURE — 82948 REAGENT STRIP/BLOOD GLUCOSE: CPT

## 2023-07-19 PROCEDURE — 99231 SBSQ HOSP IP/OBS SF/LOW 25: CPT | Performed by: PODIATRIST

## 2023-07-19 PROCEDURE — 63710000001 INSULIN DETEMIR PER 5 UNITS

## 2023-07-19 PROCEDURE — 99222 1ST HOSP IP/OBS MODERATE 55: CPT | Performed by: INTERNAL MEDICINE

## 2023-07-19 PROCEDURE — 0 CEFEPIME PER 500 MG: Performed by: STUDENT IN AN ORGANIZED HEALTH CARE EDUCATION/TRAINING PROGRAM

## 2023-07-19 PROCEDURE — 87040 BLOOD CULTURE FOR BACTERIA: CPT

## 2023-07-19 PROCEDURE — 80048 BASIC METABOLIC PNL TOTAL CA: CPT | Performed by: STUDENT IN AN ORGANIZED HEALTH CARE EDUCATION/TRAINING PROGRAM

## 2023-07-19 PROCEDURE — 85025 COMPLETE CBC W/AUTO DIFF WBC: CPT | Performed by: STUDENT IN AN ORGANIZED HEALTH CARE EDUCATION/TRAINING PROGRAM

## 2023-07-19 PROCEDURE — 97535 SELF CARE MNGMENT TRAINING: CPT

## 2023-07-19 PROCEDURE — 82607 VITAMIN B-12: CPT

## 2023-07-19 PROCEDURE — 73701 CT LOWER EXTREMITY W/DYE: CPT

## 2023-07-19 RX ORDER — FOLIC ACID 1 MG/1
5 TABLET ORAL DAILY
Status: DISCONTINUED | OUTPATIENT
Start: 2023-07-19 | End: 2023-07-22 | Stop reason: HOSPADM

## 2023-07-19 RX ADMIN — MEROPENEM 1000 MG: 1 INJECTION, POWDER, FOR SOLUTION INTRAVENOUS at 21:36

## 2023-07-19 RX ADMIN — APIXABAN 5 MG: 5 TABLET, FILM COATED ORAL at 09:31

## 2023-07-19 RX ADMIN — Medication 10 ML: at 11:54

## 2023-07-19 RX ADMIN — CETIRIZINE HYDROCHLORIDE 10 MG: 10 TABLET, FILM COATED ORAL at 20:36

## 2023-07-19 RX ADMIN — ACETAMINOPHEN 650 MG: 325 TABLET, FILM COATED ORAL at 06:30

## 2023-07-19 RX ADMIN — Medication 400 MG: at 09:31

## 2023-07-19 RX ADMIN — Medication 5000 UNITS: at 20:36

## 2023-07-19 RX ADMIN — DOCUSATE SODIUM 50 MG AND SENNOSIDES 8.6 MG 2 TABLET: 8.6; 5 TABLET, FILM COATED ORAL at 20:36

## 2023-07-19 RX ADMIN — FLUTICASONE PROPIONATE 2 SPRAY: 50 SPRAY, METERED NASAL at 10:45

## 2023-07-19 RX ADMIN — OXYCODONE HYDROCHLORIDE AND ACETAMINOPHEN 1000 MG: 500 TABLET ORAL at 20:36

## 2023-07-19 RX ADMIN — Medication 400 MG: at 20:36

## 2023-07-19 RX ADMIN — Medication 10 ML: at 20:37

## 2023-07-19 RX ADMIN — DRONEDARONE 400 MG: 400 TABLET, FILM COATED ORAL at 20:37

## 2023-07-19 RX ADMIN — METOPROLOL SUCCINATE 25 MG: 25 TABLET, FILM COATED, EXTENDED RELEASE ORAL at 08:40

## 2023-07-19 RX ADMIN — CEFEPIME 2000 MG: 2 INJECTION, POWDER, FOR SOLUTION INTRAVENOUS at 05:45

## 2023-07-19 RX ADMIN — APIXABAN 5 MG: 5 TABLET, FILM COATED ORAL at 20:37

## 2023-07-19 RX ADMIN — MEROPENEM 1000 MG: 1 INJECTION, POWDER, FOR SOLUTION INTRAVENOUS at 11:52

## 2023-07-19 RX ADMIN — METOPROLOL SUCCINATE 25 MG: 25 TABLET, FILM COATED, EXTENDED RELEASE ORAL at 20:36

## 2023-07-19 RX ADMIN — IOPAMIDOL 90 ML: 612 INJECTION, SOLUTION INTRAVENOUS at 12:39

## 2023-07-19 RX ADMIN — FOLIC ACID 5 MG: 1 TABLET ORAL at 10:45

## 2023-07-19 RX ADMIN — INSULIN DETEMIR 10 UNITS: 100 INJECTION, SOLUTION SUBCUTANEOUS at 20:37

## 2023-07-19 RX ADMIN — THERA TABS 1 TABLET: TAB at 20:36

## 2023-07-19 RX ADMIN — DOCUSATE SODIUM 50 MG AND SENNOSIDES 8.6 MG 2 TABLET: 8.6; 5 TABLET, FILM COATED ORAL at 09:31

## 2023-07-19 NOTE — CONSULTS
REASON FOR CONSULTATION:  Neutropenia   Provide an opinion on any further workup or treatment                             REQUESTING PHYSICIAN:  Wai Yu MD     RECORDS OBTAINED:  Records of the patients history including those obtained from the referring provider were reviewed and summarized in detail.      History of Present Illness     This is a pleasant 63-year-old male who was seen in consultation at the request of Dr. Yu for evaluation of neutropenia.  Patient has past medical history of diabetes mellitus, peripheral neuropathy, peripheral vascular disease, charcoaled foot, diabetic foot ulcer, atrial fibrillation, rheumatoid arthritis.  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  Patient underwent partial excision of left fibula, left talectomy and application of fixator through left lower extremity.  He was receiving IV antibiotic and LTAC and at that time was receiving DVT prophylaxis with heparin.  He subsequently was diagnosed with worsening thrombocytopenia, saddle pulmonary embolism and bilateral DVT.  He was diagnosed with heparin-induced thrombocytopenia and was started on Eliquis and improved slowly.  He was doing well at home and his left foot was healing appropriately.  He was admitted to our hospital on July 16, 2023 after he was noticed to have fever.  He came to emergency room and was noticed to have sepsis as indicated by fever, tachycardia, lactic acidosis and elevated CRP of 11.  Blood culture grew Enterococcus.  He was initially treated with ceftriaxone and now has been switched to meropenem.  He was noticed to have leukopenia with white blood cell count of 2.55 on July 19 along with mild anemia with hemoglobin of 10.3 and thrombocytopenia with platelet count of 108,000.  I been asked to assist with evaluation and management of his neutropenia, anemia and  thrombocytopenia.      Past Medical History:   Diagnosis Date    Arthritis     Atrial fibrillation     Diabetes mellitus     Diabetic foot ulcer associated with type 2 diabetes mellitus     left great toe    Hallux malleus of left foot     Hammer toe     Hypertension     MI (myocardial infarction)     Neuropathy in diabetes     Onychomycosis     Presence of biventricular cardiac pacemaker     Rheumatoid arthritis         Past Surgical History:   Procedure Laterality Date    AMPUTATION DIGIT Right 03/05/2017    Procedure: RIGHT AMPUTATION TRANSMETATRASAL , RECESSION GASTROCNEMOUS;  Surgeon: Marco A Thompson DPM;  Location: [facility] OR;  Service:     ANKLE FUSION Left 04/13/2023    Procedure: REDUCTION OF LEFT ANKLE DEFORMITY WITH APPLICATION OF EXTERNAL FIXATOR;  Surgeon: Marco A Thompson DPM;  Location: [facility] OR;  Service: Podiatry;  Laterality: Left;    CARDIAC CATHETERIZATION      CARDIAC DEFIBRILLATOR PLACEMENT  2010, 2016    CARPAL TUNNEL RELEASE  2015    elbow and wrist left arm    FOOT FUSION Left 05/15/2023    Procedure: REMOVAL OF EXTERNAL FIXATOR LEFT LOWER EXTREMITY WITH TIBIAL TALOCALCANEAL ARTHRODESIS  AND ALL INDICATED PROCEDURES;  Surgeon: Marco A Thompson DPM;  Location: [facility] OR;  Service: Podiatry;  Laterality: Left;    FOOT IRRIGATION, DEBRIDEMENT AND REPAIR Left 03/07/2018    Procedure: FOOT IRRIGATION, DEBRIDEMENT AND REPAIR;  Surgeon: Marco A Thompson DPM;  Location: [facility] OR;  Service:     FOOT IRRIGATION, DEBRIDEMENT AND REPAIR Left 03/10/2018    Procedure: FOOT IRRIGATION, DEBRIDEMENT AND REPAIR;  Surgeon: Marco A Thompson DPM;  Location: [facility] OR;  Service: Podiatry    FOOT WOUND CLOSURE Right 03/02/2017    Procedure: INCISION AND DRAINAGE, RIGHT FOOT;  Surgeon: Tung Rosenthal DPM;  Location: [facility] OR;  Service:     HAMMER TOE REPAIR Left 02/15/2018    Procedure: HAMMERTOE CORRECTION LEFT SECOND, THIRD AND FOURTH TOES AND HALLUX INTERPHALANGEAL JOINT ARTHRODESIS LEFT FOOT (Micro Asnis,  4.0 & 5.0 Asnis)      (c-arm);  Surgeon: Marco A Thompson DPM;  Location: [facility] OR;  Service:     HARDWARE REMOVAL Left 03/05/2018    Procedure: ANKLE/FOOT HARDWARE REMOVAL;  Surgeon: Marco A Thompson DPM;  Location: [facility] OR;  Service:     INCISION AND DRAINAGE LEG Left 03/05/2018    Procedure: INCISION AND DRAINAGE LOWER EXTREMITY;  Surgeon: Marco A Thompson DPM;  Location: [facility] OR;  Service:     PLANTAR FASCIA RELEASE Left 11/21/2019    Procedure: PLANTAR PLANING LEFT FOOT AND ALL OTHER INDICATED PROCEDURES;  Surgeon: Marco A Thompson DPM;  Location: [facility] OR;  Service: Podiatry    TOE AMPUTATION Right 2016    TONSILECTOMY, ADENOIDECTOMY, BILATERAL MYRINGOTOMY AND TUBES      age 25        No current facility-administered medications on file prior to encounter.     Current Outpatient Medications on File Prior to Encounter   Medication Sig Dispense Refill    acetaminophen (TYLENOL) 325 MG tablet Take 2 tablets by mouth Every 6 (Six) Hours As Needed for Mild Pain. 60 tablet 0    apixaban (ELIQUIS) 5 MG tablet tablet Take 1 tablet by mouth Every 12 (Twelve) Hours. Indications: DVT/PE (active thrombosis) 60 tablet 0    ascorbic acid (VITAMIN C) 1000 MG tablet Take 1 tablet by mouth Every Night.      Bacillus Coagulans-Inulin (Probiotic) 1-250 BILLION-MG capsule Take 1 capsule by mouth 2 (Two) Times a Day. 30 capsule 1    Blood Glucose Monitoring Suppl (ONE TOUCH ULTRA 2) w/Device kit       Cholecalciferol 125 MCG (5000 UT) tablet Take 1 tablet by mouth Every Night.      dronedarone (MULTAQ) 400 MG tablet Take 1 tablet by mouth Every Night.      fexofenadine (ALLEGRA) 180 MG tablet Take 1 tablet by mouth Every Night.      fluticasone (FLONASE) 50 MCG/ACT nasal spray 2 sprays into the nostril(s) as directed by provider As Needed for Rhinitis.      furosemide (Lasix) 20 MG tablet Take 1 tablet by mouth Daily. 10 tablet 0    glimepiride (AMARYL) 4 MG tablet Take 1 tablet by mouth Every Night.      magnesium oxide  (MAG-OX) 400 MG tablet Take 1 tablet by mouth 2 (Two) Times a Day.      metFORMIN (GLUCOPHAGE) 1000 MG tablet Take 1 tablet by mouth 2 (Two) Times a Day With Meals.      metoprolol succinate XL (TOPROL-XL) 25 MG 24 hr tablet Take 1 tablet by mouth 2 (Two) Times a Day. 0.5 tablet      multivitamin (THERAGRAN) tablet tablet Take  by mouth Every Night.      ONE TOUCH ULTRA TEST test strip USE TO TEST BLOOD SUGAR THREE TIMES A DAY  1    Zinc 50 MG tablet Take 1 tablet by mouth Every Night.      levoFLOXacin (LEVAQUIN) 500 MG tablet Take 1 tablet by mouth Daily. 21 tablet 0    ondansetron (ZOFRAN) 4 MG tablet Take 1 tablet by mouth Every 6 (Six) Hours As Needed for Nausea or Vomiting. 30 tablet 0        ALLERGIES:    Allergies   Allergen Reactions    Heparin Other (See Comments)     Heparin induce thrombocytopenia     Januvia [Sitagliptin] Hives    Lipitor [Atorvastatin] Other (See Comments)     Muscle Cramps...    Shellfish Allergy Swelling and Other (See Comments)     Age 13 this occurred doesn't remember what he ate swelled lips and face    Sulfa Antibiotics Rash     Pt was age of 4 when he was told by his family he had a reaction, pt is unsure         Social History     Socioeconomic History    Marital status:    Tobacco Use    Smoking status: Never    Smokeless tobacco: Never   Vaping Use    Vaping Use: Never used   Substance and Sexual Activity    Alcohol use: Yes     Comment: social, once every three months    Drug use: No    Sexual activity: Defer        Family History   Problem Relation Age of Onset    Diabetes Father     Stroke Father     No Known Problems Maternal Grandmother     No Known Problems Maternal Grandfather     No Known Problems Paternal Grandmother     No Known Problems Paternal Grandfather     No Known Problems Daughter     No Known Problems Daughter     No Known Problems Son     Heart disease Other     Hypertension Other         Review of Systems   Constitutional:  Positive for activity  change, chills, fatigue and fever. Negative for unexpected weight change.   HENT:  Negative for congestion, hearing loss, sore throat and voice change.    Respiratory:  Negative for cough, chest tightness, shortness of breath and wheezing.    Cardiovascular:  Positive for leg swelling. Negative for chest pain and palpitations.   Gastrointestinal:  Negative for abdominal distention, abdominal pain, blood in stool, constipation, diarrhea, nausea and vomiting.   Endocrine: Negative for cold intolerance, heat intolerance and polydipsia.   Genitourinary:  Negative for difficulty urinating, dysuria and hematuria.   Musculoskeletal:  Positive for arthralgias, back pain and gait problem.   Skin:  Negative for color change, pallor and rash.   Allergic/Immunologic: Negative for environmental allergies and food allergies.   Neurological:  Positive for weakness and numbness. Negative for dizziness and headaches.   Hematological:  Negative for adenopathy. Bruises/bleeds easily.   Psychiatric/Behavioral:  Negative for agitation, confusion and dysphoric mood. The patient is not nervous/anxious.       Objective     Vitals:    07/19/23 0306 07/19/23 0454 07/19/23 0722 07/19/23 0742   BP: 124/69  128/78    BP Location: Right arm  Right arm    Patient Position: Lying  Lying    Pulse: 78 72 74 71   Resp: 18  18    Temp: 98.6 °F (37 °C)  97.6 °F (36.4 °C)    TempSrc:   Oral    SpO2: 96%  98%    Weight:       Height:              No data to display                Physical Exam  Vitals and nursing note reviewed.   Constitutional:       General: He is not in acute distress.     Appearance: He is obese. He is not ill-appearing.   HENT:      Mouth/Throat:      Mouth: Mucous membranes are moist.      Pharynx: No oropharyngeal exudate.   Eyes:      General: No scleral icterus.     Pupils: Pupils are equal, round, and reactive to light.   Cardiovascular:      Rate and Rhythm: Normal rate. Rhythm irregular.      Heart sounds: No murmur  heard.  Pulmonary:      Effort: Pulmonary effort is normal. No respiratory distress.      Breath sounds: Normal breath sounds. No wheezing.   Abdominal:      General: There is no distension.      Palpations: Abdomen is soft. There is no mass.      Tenderness: There is no abdominal tenderness.   Musculoskeletal:      Cervical back: Normal range of motion and neck supple.      Comments: Left leg in boots.    Lymphadenopathy:      Cervical: No cervical adenopathy.   Skin:     General: Skin is dry.      Coloration: Skin is not pale.      Findings: Bruising present.   Neurological:      General: No focal deficit present.      Mental Status: He is alert and oriented to person, place, and time. Mental status is at baseline.   Psychiatric:         Mood and Affect: Mood normal.         Behavior: Behavior normal.         Thought Content: Thought content normal.             RECENT LABS: Independently reviewed and summarized  Hematology WBC   Date Value Ref Range Status   07/19/2023 2.55 (L) 3.40 - 10.80 10*3/mm3 Final     RBC   Date Value Ref Range Status   07/19/2023 3.67 (L) 4.14 - 5.80 10*6/mm3 Final     Hemoglobin   Date Value Ref Range Status   07/19/2023 10.3 (L) 13.0 - 17.7 g/dL Final     Hematocrit   Date Value Ref Range Status   07/19/2023 31.3 (L) 37.5 - 51.0 % Final     Platelets   Date Value Ref Range Status   07/19/2023 108 (L) 140 - 450 10*3/mm3 Final          LAB RESULTS:      Lab 07/19/23  0536 07/18/23  0318 07/17/23  0535 07/17/23  0000 07/16/23  1727 07/16/23  1436 07/16/23  1206 07/16/23  0913   WBC 2.55* 2.99* 4.49  --   --   --   --  5.43   HEMOGLOBIN 10.3* 9.5* 10.4*  --   --   --   --  11.3*   HEMATOCRIT 31.3* 28.8* 31.5*  --   --   --   --  33.9*   PLATELETS 108* 80* 91*  --   --   --   --  112*   NEUTROS ABS 1.12* 1.97 3.23  --   --   --   --  4.77   IMMATURE GRANS (ABS) 0.01  --   --   --   --   --   --  0.03   LYMPHS ABS 0.89  --   --   --   --   --   --  0.43*   MONOS ABS 0.40  --   --   --   --    --   --  0.19   EOS ABS 0.11 0.06  --   --   --   --   --  0.00   MCV 85.3 85.5 85.6  --   --   --   --  84.8   SED RATE  --   --  23*  --   --   --   --   --    CRP  --   --   --   --   --   --   --  11.00*   LACTATE  --   --   --  1.0 2.3* 2.5* 2.5* 4.7*   PROTIME  --   --   --   --   --   --   --  17.6*         Lab 07/19/23  0536 07/18/23  0317 07/17/23  0535 07/16/23  0913   SODIUM 138 138 136 133*   POTASSIUM 3.8 3.8 3.8 4.1   CHLORIDE 103 104 103 96*   CO2 26.0 25.0 24.0 20.0*   ANION GAP 9.0 9.0 9.0 17.0*   BUN 14 17 17 28*   CREATININE 0.92 0.82 0.93 1.16   EGFR 94.6 99.9 93.4 71.7   GLUCOSE 174* 157* 125* 242*   CALCIUM 8.6 8.3* 8.2* 8.5*   TSH  --   --   --  0.439         Lab 07/16/23  0913   TOTAL PROTEIN 7.2   ALBUMIN 3.8   GLOBULIN 3.4   ALT (SGPT) 23   AST (SGOT) 17   BILIRUBIN 1.3*   ALK PHOS 106         Lab 07/16/23  0913   HSTROP T 30*   PROTIME 17.6*   INR 1.46*                 Brief Urine Lab Results  (Last result in the past 365 days)        Color   Clarity   Blood   Leuk Est   Nitrite   Protein   CREAT   Urine HCG        07/18/23 1730 Yellow   Clear   Negative   Negative   Negative   Trace                 Microbiology Results (last 10 days)       Procedure Component Value - Date/Time    Respiratory Panel PCR w/COVID-19(SARS-CoV-2) [facility]/ALFA/[facility] In-House, NP Swab in [facility], 3-4 HR TAT - Swab, Nasopharynx [[id number]]  (Normal) Collected: 07/16/23 1035    Lab Status: Final result Specimen: Swab from Nasopharynx Updated: 07/16/23 1134     ADENOVIRUS, PCR Not Detected     Coronavirus 229E Not Detected     Coronavirus HKU1 Not Detected     Coronavirus NL63 Not Detected     Coronavirus OC43 Not Detected     COVID19 Not Detected     Human Metapneumovirus Not Detected     Human Rhinovirus/Enterovirus Not Detected     Influenza A PCR Not Detected     Influenza B PCR Not Detected     Parainfluenza Virus 1 Not Detected     Parainfluenza Virus 2 Not Detected     Parainfluenza Virus 3 Not  Detected     Parainfluenza Virus 4 Not Detected     RSV, PCR Not Detected     Bordetella pertussis pcr Not Detected     Bordetella parapertussis PCR Not Detected     Chlamydophila pneumoniae PCR Not Detected     Mycoplasma pneumo by PCR Not Detected    Narrative:      In the setting of a positive respiratory panel with a viral infection PLUS a negative procalcitonin without other underlying concern for bacterial infection, consider observing off antibiotics or discontinuation of antibiotics and continue supportive care. If the respiratory panel is positive for atypical bacterial infection (Bordetella pertussis, Chlamydophila pneumoniae, or Mycoplasma pneumoniae), consider antibiotic de-escalation to target atypical bacterial infection.    Blood Culture - Blood, Arm, Left [[id number]]  (Abnormal) Collected: 07/16/23 1001    Lab Status: Preliminary result Specimen: Blood from Arm, Left Updated: 07/18/23 2213     Blood Culture Abnormal Stain     Gram Stain Aerobic Bottle Gram negative bacilli     Comment: 2nd bottle positive          Blood Culture - Blood, Arm, Left [[id number]]  (Abnormal) Collected: 07/16/23 0952    Lab Status: Preliminary result Specimen: Blood from Arm, Left Updated: 07/19/23 0525     Blood Culture Gram Negative Bacilli     Isolated from Anaerobic Bottle     Gram Stain Anaerobic Bottle Gram negative bacilli    Blood Culture ID, PCR - Blood, Arm, Left [[id number]]  (Abnormal) Collected: 07/16/23 0952    Lab Status: Final result Specimen: Blood from Arm, Left Updated: 07/18/23 0451     BCID, PCR Enterobacter cloacae complex. Identification by BCID2 PCR.    Narrative:      No resistance genes detected.              Imaging (independently reviewed and summarized):       XR Foot 3+ View Left    Result Date: 7/17/2023  1.  Advanced Charcot arthropathy of the left foot which is similar to the patient's prior study. 2.  Probable fracture involving the base of the proximal phalanx of the fifth digit.    CT  Head Without Contrast    Result Date: 7/16/2023  1. No acute intracranial process.          Diagnosis:   (1) Neutropenia   (2) Anemia   (3) Thrombocytopenia   (4) Heparin-induced thrombocytopenia  (5) Bilateral PE   (6) Bilateral DVT   (7) Sepsis     Assessment & Plan       (1) Neutropenia     Patient admitted to hospital with sepsis from Enterobacter species and currently on antibiotic.  On admission his white blood cell count was normal at 4.49 however declined to 2.99 on July 18 and is 2.55 today.  Differential showed neutropenia with ANC of 1.1.  He was initially treated with ceftriaxone and now has been switched to meropenem.  Clinically, his sepsis seems to be improving.  He is afebrile.  Tachycardia has resolved.    Likely his neutropenia is secondary to sepsis related bone marrow myelosuppression versus drug-induced neutropenia.  Ceftriaxone can potentially cause drug-induced neutropenia although he has only received short duration of ceftriaxone.  Having said that given his prior exposure to antibiotic drug-induced neutropenia remains in the differential.    I will check B12 and folic acid to rule out nutritional deficiencies.  I will review peripheral smear and check peripheral blood flow cytometry.    His antibiotic has been switched to meropenem.  Clinically he is improving.  He is ANC is over thousand.  At this point I do not believe he will need any growth factor support unless his absolute neutrophil count drops less than 500.    We will continue to monitor with CBC with differential while he is in the hospital.        (2) Anemia     Patient with chronic anemia likely secondary to anemia of inflammation.  His hemoglobin is 10.3 today.  I will check ferritin, iron profile, B12 and folic acid.  We will continue to monitor his hemoglobin closely.    (3) Thrombocytopenia     Patient with history of heparin-induced thrombocytopenia for which she is currently on treatment with Eliquis.  He has not had any  heparin exposure.  His platelet count was mildly low at 112,000 on admission and dropped to 80,000 on July 18 and on July 19 has improved 208,000.  This is likely sepsis related consumption.  With appropriate antibiotic it seems that his thrombocytopenia is improving.  He does not have any bleeding.  Continue to monitor.      (4) Heparin-induced thrombocytopenia(5) Bilateral PE   (6) Bilateral DVT     Patient was diagnosed with bilateral saddle pulmonary embolism, bilateral DVT and heparin-induced thrombocytopenia in May 2023 after his surgery for charcoaled foot.  He is currently on Eliquis.  No heparin exposure since then.  Heparin has been listed as an allergy.  Please avoid all heparin products.  Continue Eliquis.  Monitor for bleeding complication closely.      (7) Sepsis    Patient admitted with sepsis from Enterobacter cloaca.  He is currently on IV meropenem.  Source remains unclear.  CT scan of left lower extremity has been ordered by primary team.  We will follow-up on the result.

## 2023-07-19 NOTE — PLAN OF CARE
Goal Outcome Evaluation:  Plan of Care Reviewed With: patient        Progress: no change  Outcome Evaluation: Pt states he does not want to take a bath until his hospital bed is changed out (HOB limited movement). Pt states he will then get OOB and take a bath. Pt agrees to B UE exercises at this time. Pt completed B UE exercises with 2 lb wt 2 sets x 15 reps with RBs as needed. Pt with limited R shoulder ROM. Pt would benefit from continued OT services      Anticipated Discharge Disposition (OT): home with assist, home with 24/7 care, home with home health

## 2023-07-19 NOTE — PLAN OF CARE
"Goal Outcome Evaluation:           Progress: improving  Outcome Evaluation: Pt has remained afebrile throughout this night with max temp 99. VSS. Have been assisting patient with bed mobility to decrease redness noted to buttock and coccyx area. No c/o pain. Received Tylenol PRN x 1 dose throughout night with pt stating \"I don't want the fever and chills to come back\".         "

## 2023-07-19 NOTE — THERAPY TREATMENT NOTE
Patient Name: Emre Lisa  : 1960    MRN: [id number]                              Today's Date: 2023       Admit Date: 2023    Visit Dx:     ICD-10-CM ICD-9-CM   1. Fever of unknown origin  R50.9 780.60   2. Sepsis due to other etiology  A41.89 038.8   3. Impaired functional mobility, balance, gait, and endurance [Z74.09 (ICD-10-CM)]  Z74.09 V49.89   4. Impaired mobility and activities of daily living [Z74.09, Z78.9 (ICD-10-CM)]  Z74.09 V49.89    Z78.9      Patient Active Problem List   Diagnosis    Foot osteomyelitis, right    Nodule of groin    Type 2 diabetes mellitus with skin complication    Status post transmetatarsal amputation of right foot    Hammer toe of left foot    Hallux malleus of left foot    Cellulitis of left foot    Infected hardware in left leg    Chronic multifocal osteomyelitis of left foot    Charcot's joint of left foot    Skin ulcer of left foot, limited to breakdown of skin    Dyspnea    Syncope    Acute respiratory failure with hypoxia    Tachycardia    Neuropathy due to secondary diabetes mellitus    Atrial fibrillation    Presence of biventricular cardiac pacemaker    Hypertension    Medically complex patient    Fever of unknown origin    Bacteremia    Primary osteoarthritis of knees, bilateral    Sepsis    Fever, unknown origin    Septicemia due to Enterobacter     Past Medical History:   Diagnosis Date    Arthritis     Atrial fibrillation     Diabetes mellitus     Diabetic foot ulcer associated with type 2 diabetes mellitus     left great toe    Hallux malleus of left foot     Hammer toe     Hypertension     MI (myocardial infarction)     Neuropathy in diabetes     Onychomycosis     Presence of biventricular cardiac pacemaker     Rheumatoid arthritis      Past Surgical History:   Procedure Laterality Date    AMPUTATION DIGIT Right 2017    Procedure: RIGHT AMPUTATION TRANSMETATRASAL , RECESSION GASTROCNEMOUS;  Surgeon: Marco A Thompson DPM;  Location:   [facility] OR;  Service:     ANKLE FUSION Left 04/13/2023    Procedure: REDUCTION OF LEFT ANKLE DEFORMITY WITH APPLICATION OF EXTERNAL FIXATOR;  Surgeon: Marco A Thompson DPM;  Location: [facility] OR;  Service: Podiatry;  Laterality: Left;    CARDIAC CATHETERIZATION      CARDIAC DEFIBRILLATOR PLACEMENT  2010, 2016    CARPAL TUNNEL RELEASE  2015    elbow and wrist left arm    FOOT FUSION Left 05/15/2023    Procedure: REMOVAL OF EXTERNAL FIXATOR LEFT LOWER EXTREMITY WITH TIBIAL TALOCALCANEAL ARTHRODESIS  AND ALL INDICATED PROCEDURES;  Surgeon: Marco A Thompson DPM;  Location: [facility] OR;  Service: Podiatry;  Laterality: Left;    FOOT IRRIGATION, DEBRIDEMENT AND REPAIR Left 03/07/2018    Procedure: FOOT IRRIGATION, DEBRIDEMENT AND REPAIR;  Surgeon: Marco A Thompson DPM;  Location: [facility] OR;  Service:     FOOT IRRIGATION, DEBRIDEMENT AND REPAIR Left 03/10/2018    Procedure: FOOT IRRIGATION, DEBRIDEMENT AND REPAIR;  Surgeon: Marco A Thompson DPM;  Location: [facility] OR;  Service: Podiatry    FOOT WOUND CLOSURE Right 03/02/2017    Procedure: INCISION AND DRAINAGE, RIGHT FOOT;  Surgeon: Tung Rosenthal DPM;  Location: [facility] OR;  Service:     HAMMER TOE REPAIR Left 02/15/2018    Procedure: HAMMERTOE CORRECTION LEFT SECOND, THIRD AND FOURTH TOES AND HALLUX INTERPHALANGEAL JOINT ARTHRODESIS LEFT FOOT (Micro Asnis, 4.0 & 5.0 Asnis)      (c-arm);  Surgeon: Marco A Thompson DPM;  Location: [facility] OR;  Service:     HARDWARE REMOVAL Left 03/05/2018    Procedure: ANKLE/FOOT HARDWARE REMOVAL;  Surgeon: Marco A Thompson DPM;  Location: [facility] OR;  Service:     INCISION AND DRAINAGE LEG Left 03/05/2018    Procedure: INCISION AND DRAINAGE LOWER EXTREMITY;  Surgeon: Marco A Thompson DPM;  Location: [facility] OR;  Service:     PLANTAR FASCIA RELEASE Left 11/21/2019    Procedure: PLANTAR PLANING LEFT FOOT AND ALL OTHER INDICATED PROCEDURES;  Surgeon: Marco A Thompson DPM;  Location: [facility] OR;  Service: Podiatry    TOE AMPUTATION Right 2016     TONSILECTOMY, ADENOIDECTOMY, BILATERAL MYRINGOTOMY AND TUBES      age 25      General Information       Row Name 07/19/23 1103 07/19/23 0812       OT Time and Intention    Document Type therapy note (daily note)  -BB therapy note (daily note)  -BB    Mode of Treatment individual therapy;occupational therapy  -BB individual therapy;occupational therapy  -BB      Row Name 07/19/23 1103 07/19/23 0812       General Information    Patient Profile Reviewed yes  -BB yes  -BB    Existing Precautions/Restrictions fall  -BB fall  -BB      Row Name 07/19/23 1103 07/19/23 0812       Cognition    Orientation Status (Cognition) oriented x 4  -BB oriented x 4  -BB      Row Name 07/19/23 1103 07/19/23 0812       Safety Issues, Functional Mobility    Impairments Affecting Function (Mobility) balance;endurance/activity tolerance;range of motion (ROM);shortness of breath;strength  -BB balance;endurance/activity tolerance;range of motion (ROM);shortness of breath;strength  -BB              User Key  (r) = Recorded By, (t) = Taken By, (c) = Cosigned By      Initials Name Provider Type    BB Paola Fortune COTA Occupational Therapist Assistant                     Mobility/ADL's       Row Name 07/19/23 1103 07/19/23 0812       Bed Mobility    Bed Mobility supine-sit;sit-supine;scooting/bridging  -BB supine-sit;sit-supine;scooting/bridging  -BB    Scooting/Bridging [place] (Bed Mobility) --  bridged using R LE for pressure relief  -BB --  bridged using R LE for pressure relief  -BB    Assistive Device (Bed Mobility) bed rails;head of bed elevated  -BB bed rails;head of bed elevated  -BB      Row Name 07/19/23 1103 07/19/23 0812       Transfers    Transfers sit-stand transfer;stand-sit transfer  -BB sit-stand transfer  -BB      Row Name 07/19/23 1103 07/19/23 0812       Sit-Stand Transfer    Sit-Stand [place] (Transfers) moderate assist (50% patient effort)  -BB --    Assistive Device (Sit-Stand Transfers) walker,  front-wheeled  -BB walker, front-wheeled  -BB      Row Name 07/19/23 1103          Stand-Sit Transfer    Assistive Device (Stand-Sit Transfers) walker, front-wheeled  -BB       Row Name 07/19/23 1103 07/19/23 0812       Functional Mobility    Functional Mobility- Ind. Level minimum assist (75% patient effort)  -BB minimum assist (75% patient effort)  -BB    Functional Mobility- Device walker, front-wheeled  -BB walker, front-wheeled  -BB      Row Name 07/19/23 1103          Activities of Daily Living    BADL Assessment/Intervention grooming  -BB       Row Name 07/19/23 0812          Bathing Assessment/Intervention    Assistive Devices (Bathing) long-handled sponge  -BB       Row Name 07/19/23 1103 07/19/23 0812       Lower Body Dressing Assessment/Training    [place] Level (Lower Body Dressing) unable to assess  -BB unable to assess  -BB    Position (Lower Body Dressing) edge of bed sitting  -BB --      Row Name 07/19/23 1103          Grooming Assessment/Training    [place] Level (Grooming) hair care, combing/brushing;oral care regimen;wash face, hands;modified independence  -BB     Position (Grooming) sitting up in bed  -BB               User Key  (r) = Recorded By, (t) = Taken By, (c) = Cosigned By      Initials Name Provider Type    BB Paola Fortune COTA Occupational Therapist Assistant                   Obj/Interventions       Row Name 07/19/23 1103 07/19/23 0812       Range of Motion Comprehensive    General Range of Motion other (see comments)  -BB other (see comments)  -BB      Row Name 07/19/23 1103 07/19/23 0812       Strength Comprehensive (MMT)    General Manual Muscle Testing (MMT) Assessment other (see comments)  -BB other (see comments)  -BB      Row Name 07/19/23 0812          Shoulder (Therapeutic Exercise)    Shoulder (Therapeutic Exercise) AROM (active range of motion);strengthening exercise  -BB     Shoulder AROM (Therapeutic Exercise) left;flexion;extension;horizontal  aBduction/aDduction;15 repititions;2 sets  chest press  -BB     Shoulder Strengthening (Therapeutic Exercise) 2 lb free weight  -BB       Row Name 07/19/23 0812          Elbow/Forearm (Therapeutic Exercise)    Elbow/Forearm (Therapeutic Exercise) strengthening exercise  -BB     Elbow/Forearm Strengthening (Therapeutic Exercise) bilateral;flexion;extension;supination;pronation;2 lb free weight;15 repititions;2 sets  -BB       Row Name 07/19/23 0812          Wrist (Therapeutic Exercise)    Wrist (Therapeutic Exercise) strengthening exercise  -BB     Wrist Strengthening (Therapeutic Exercise) bilateral;flexion;extension;2 lb free weight;15 repititions;2 sets  -BB       Row Name 07/19/23 0812          Motor Skills    Therapeutic Exercise shoulder;elbow/forearm;wrist  -BB               User Key  (r) = Recorded By, (t) = Taken By, (c) = Cosigned By      Initials Name Provider Type    BB Paola Fortune COTA Occupational Therapist Assistant                   Goals/Plan       Row Name 07/19/23 1103 07/19/23 0812       Transfer Goal 1 (OT)    Activity/Assistive Device (Transfer Goal 1, OT) transfers, all  -BB transfers, all  -BB    Wise Level/Cues Needed (Transfer Goal 1, OT) modified independence  -BB modified independence  -BB    Time Frame (Transfer Goal 1, OT) long term goal (LTG)  -BB long term goal (LTG)  -BB    Progress/Outcome (Transfer Goal 1, OT) goal not met  -BB goal not met  -BB      Row Name 07/19/23 1103 07/19/23 0812       Bathing Goal 1 (OT)    Activity/Device (Bathing Goal 1, OT) bathing skills, all  -BB bathing skills, all  -BB    Wise Level/Cues Needed (Bathing Goal 1, OT) supervision required  -BB supervision required  -BB    Time Frame (Bathing Goal 1, OT) long term goal (LTG)  -BB long term goal (LTG)  -BB    Progress/Outcomes (Bathing Goal 1, OT) goal not met  -BB goal not met  -BB      Row Name 07/19/23 1103 07/19/23 0812       Dressing Goal 1 (OT)    Activity/Device (Dressing  Goal 1, OT) dressing skills, all  -BB dressing skills, all  -BB    Epsom/Cues Needed (Dressing Goal 1, OT) supervision required  -BB supervision required  -BB    Time Frame (Dressing Goal 1, OT) long term goal (LTG)  -BB long term goal (LTG)  -BB    Progress/Outcome (Dressing Goal 1, OT) goal not met  -BB goal not met  -BB      Row Name 07/19/23 1103 07/19/23 0812       Toileting Goal 1 (OT)    Activity/Device (Toileting Goal 1, OT) toileting skills, all  -BB toileting skills, all  -BB    [place] Level/Cues Needed (Toileting Goal 1, OT) modified independence  -BB modified independence  -BB    Time Frame (Toileting Goal 1, OT) long term goal (LTG)  -BB long term goal (LTG)  -BB    Progress/Outcome (Toileting Goal 1, OT) goal not met  -BB goal not met  -BB              User Key  (r) = Recorded By, (t) = Taken By, (c) = Cosigned By      Initials Name Provider Type    BB Paola Fortune COTA Occupational Therapist Assistant                   Clinical Impression       Row Name 07/19/23 1103 07/19/23 0812       Pain Assessment    Pretreatment Pain Rating 2/10  -BB 2/10  -BB    Posttreatment Pain Rating 2/10  -BB 2/10  -BB    Pain Location - neck  -BB neck  -BB    Pain Intervention(s) Declines  -BB Declines  -BB      Row Name 07/19/23 0812          Plan of Care Review    Plan of Care Reviewed With patient  -BB     Progress no change  -BB     Outcome Evaluation Pt states he does not want to take a bath until his hospital bed is changed out (HOB limited movement). Pt states he will then get OOB and take a bath. Pt agrees to B UE exercises at this time. Pt completed B UE exercises with 2 lb wt 2 sets x 15 reps with RBs as needed. Pt with limited R shoulder ROM. Pt would benefit from continued OT services  -BB       Row Name 07/19/23 1103 07/19/23 0812       Therapy Assessment/Plan (OT)    Rehab Potential (OT) good, to achieve stated therapy goals  -BB good, to achieve stated therapy goals  -BB    Criteria for  Skilled Therapeutic Interventions Met (OT) yes;meets criteria  -BB yes;meets criteria  -BB    Therapy Frequency (OT) other (see comments)  5-7 days/wk  -BB other (see comments)  5-7 days/wk  -BB      Row Name 07/19/23 1103 07/19/23 0812       Therapy Plan Review/Discharge Plan (OT)    Anticipated Discharge Disposition (OT) home with assist;home with 24/7 care;home with home health  -BB home with assist;home with 24/7 care;home with home health  -BB      Row Name 07/19/23 0812          Vital Signs    Pre Systolic BP Rehab 131  -BB     Pre Treatment Diastolic BP 73  -BB     Posttreatment Heart Rate (beats/min) 71  -BB     Pre SpO2 (%) 95  -BB     O2 Delivery Pre Treatment room air  -BB     Pre Patient Position Supine  -BB       Row Name 07/19/23 1103 07/19/23 0812       Positioning and Restraints    Pre-Treatment Position in bed  -BB in bed  -BB    Post Treatment Position bed  -BB bed  -BB    In Bed fowlers;call light within reach;encouraged to call for assist;exit alarm on  -BB fowlers;call light within reach;encouraged to call for assist;exit alarm on  -BB              User Key  (r) = Recorded By, (t) = Taken By, (c) = Cosigned By      Initials Name Provider Type    BB Paola Fortune COTA Occupational Therapist Assistant                   Outcome Measures       Row Name 07/19/23 0812          How much help from another is currently needed...    Putting on and taking off regular lower body clothing? 2  -BB     Bathing (including washing, rinsing, and drying) 2  -BB     Toileting (which includes using toilet bed pan or urinal) 2  -BB     Putting on and taking off regular upper body clothing 3  -BB     Taking care of personal grooming (such as brushing teeth) 4  -BB     Eating meals 4  -BB     AM-PAC 6 Clicks Score (OT) 17  -BB       Row Name 07/19/23 1300 07/19/23 0953       How much help from another person do you currently need...    Turning from your back to your side while in flat bed without using bedrails?  3  -TA 3  -RC    Moving from lying on back to sitting on the side of a flat bed without bedrails? 3  -TA 3  -RC    Moving to and from a bed to a chair (including a wheelchair)? 3  -TA 3  -RC    Standing up from a chair using your arms (e.g., wheelchair, bedside chair)? 3  -TA 2  -RC    Climbing 3-5 steps with a railing? 2  -TA 2  -RC    To walk in hospital room? 3  -TA 2  -RC    AM-PAC 6 Clicks Score (PT) 17  -TA 15  -RC    Highest level of mobility 5 --> Static standing  -TA 4 --> Transferred to chair/commode  -RC      Row Name 07/19/23 1300          Functional Assessment    Outcome Measure Options AM-PAC 6 Clicks Basic Mobility (PT)  -TA               User Key  (r) = Recorded By, (t) = Taken By, (c) = Cosigned By      Initials Name Provider Type    TA Magda Rader, PTA Physical Therapist Assistant    Paola Terrell COTA Occupational Therapist Assistant    Eugenie Elise, RN Registered Nurse                    Occupational Therapy Education       Title: PT OT SLP Therapies (In Progress)       Topic: Occupational Therapy (In Progress)       Point: ADL training (Done)       Description:   Instruct learner(s) on proper safety adaptation and remediation techniques during self care or transfers.   Instruct in proper use of assistive devices.                  Learning Progress Summary             Patient Acceptance, E, VU by BB at 7/19/2023 1405    Comment: Home safety/fall prevention and equipment needs discussed with pt.    Acceptance, E,TB, VU by BB at 7/19/2023 1337    Acceptance, E,TB, VU by BB at 7/18/2023 1322                         Point: Home exercise program (Not Started)       Description:   Instruct learner(s) on appropriate technique for monitoring, assisting and/or progressing therapeutic exercises/activities.                  Learner Progress:  Not documented in this visit.              Point: Precautions (Done)       Description:   Instruct learner(s) on prescribed precautions during  self-care and functional transfers.                  Learning Progress Summary             Patient Acceptance, E, VU by RB at 7/17/2023 1317    Comment: Edu pt on use of gait belt and non skid socks when OOB and no OOB without assist.                         Point: Body mechanics (Done)       Description:   Instruct learner(s) on proper positioning and spine alignment during self-care, functional mobility activities and/or exercises.                  Learning Progress Summary             Patient Acceptance, E, VU by BB at 7/19/2023 1405    Comment: Home safety/fall prevention and equipment needs discussed with pt.    Acceptance, E,TB, VU by BB at 7/19/2023 1337    Acceptance, E,TB, VU by BB at 7/18/2023 1322                                         User Key       Initials Effective Dates Name Provider Type Discipline    RB 07/11/23 -  Sincere Dyer, OT Occupational Therapist OT    BB 06/16/21 -  Paola Fortune COTA Occupational Therapist Assistant OT                  OT Recommendation and Plan  Therapy Frequency (OT): other (see comments) (5-7 days/wk)  Plan of Care Review  Plan of Care Reviewed With: patient  Progress: no change  Outcome Evaluation: Pt states he does not want to take a bath until his hospital bed is changed out (HOB limited movement). Pt states he will then get OOB and take a bath. Pt agrees to B UE exercises at this time. Pt completed B UE exercises with 2 lb wt 2 sets x 15 reps with RBs as needed. Pt with limited R shoulder ROM. Pt would benefit from continued OT services     Time Calculation:         Time Calculation- OT       Row Name 07/19/23 1404 07/19/23 1338          Time Calculation- OT    OT Start Time 1103  -BB 0812  -BB     OT Stop Time 1128  -BB 0852  -BB     OT Time Calculation (min) 25 min  -BB 40 min  -BB     Total Timed Code Minutes- OT 25 minute(s)  -BB 40 minute(s)  -BB     OT Received On 07/19/23  -BB 07/19/23  -BB        Timed Charges    31949 - OT Therapeutic Exercise  Minutes -- 40  -BB     78091 - OT Self Care/Mgmt Minutes 25  -BB --        Total Minutes    Timed Charges Total Minutes 25  -BB 40  -BB      Total Minutes 25  -BB 40  -BB               User Key  (r) = Recorded By, (t) = Taken By, (c) = Cosigned By      Initials Name Provider Type    Paola Terrell COTA Occupational Therapist Assistant                  Therapy Charges for Today       Code Description Service Date Service Provider Modifiers Qty    96384985500 HC OT SELF CARE/MGMT/TRAIN EA 15 MIN 7/18/2023 Paola Fortune COTA GO 4    62756100038 HC OT THER PROC EA 15 MIN 7/19/2023 Paola Fortune COTA GO 3    97080666544 HC OT SELF CARE/MGMT/TRAIN EA 15 MIN 7/19/2023 Paola Fortune COTA GO 2                 ZIGGY Gage  7/19/2023

## 2023-07-19 NOTE — PROGRESS NOTES
[facility] Medicine Services  INPATIENT PROGRESS NOTE    Length of Stay: 2  Date of Admission: 7/16/2023  Primary Care Physician: Jamaal Bravo MD    Subjective   Chief Complaint: diabetic foot  HPI:  63-year-old male with diabetes mellitus with chronic left foot ulcer and osteomyelitis of left foot, Charcot joint, s/p removal of external fixator with tibial talocalcaneal arthrodesis in May 2023, followed by podiatry with LLE wrapped in compression boot, recently hospitalized with sepsis, fever with Enterobacter bacteremia treated with Zosyn.  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     He presented back after developing fevers/chills, had sinus tachycardia, lactic acidosis, elevated CRP, admitted with sepsis.  Empirically treated with broad-spectrum antibiotics and fluid resuscitation.   There was no clear evidence of superficial soft tissue infection, although x-rays of lower extremity did show soft tissue swelling, otherwise with previous fusion with intact hardware.   He was subsequently found to have recurrence of Enterobacter cloace bacteremia.  Given known issues with chronic osteomyelitis and significantly elevated CRP, most likely source of bacteremia would be related to this.  Vancomycin discontinued and continued on IV cefepime pending further culture sensitivities. Progressive pancytopenia, probable bone marrow toxicity, will change antibiotic. MRI was not possible to do because of pacemaker compatibility. Will order CT left foot and leg.       As of today, 07/19/23  Review of Systems   Constitutional:  Positive for chills and diaphoresis. Negative for activity change, appetite change and fatigue.   HENT:  Negative for congestion, ear discharge, hearing loss,  rhinorrhea, sinus pain, sneezing and sore throat.    Eyes:  Negative for photophobia, discharge and visual disturbance.   Respiratory:  Negative for cough, chest tightness, shortness of breath and wheezing.    Cardiovascular:  Negative for chest pain and palpitations.   Gastrointestinal:  Negative for abdominal distention, abdominal pain, blood in stool, diarrhea, nausea and vomiting.   Endocrine: Negative for cold intolerance, heat intolerance, polydipsia, polyphagia and polyuria.   Genitourinary:  Negative for dysuria, flank pain, hematuria and urgency.   Musculoskeletal:  Negative for arthralgias, joint swelling and myalgias.   Skin:  Negative for color change.   Allergic/Immunologic: Negative for food allergies.   Neurological:  Negative for dizziness, seizures, syncope, speech difficulty, weakness and headaches.   Hematological:  Negative for adenopathy. Does not bruise/bleed easily.   Psychiatric/Behavioral:  Negative for confusion, hallucinations, self-injury and suicidal ideas. The patient is not nervous/anxious.       All pertinent negatives and positives are as above. All other systems have been reviewed and are negative unless otherwise stated.    Objective    Temp:  [97.6 °F (36.4 °C)-99 °F (37.2 °C)] 97.6 °F (36.4 °C)  Heart Rate:  [] 71  Resp:  [18-19] 18  BP: (124-133)/(69-78) 128/78    AM-PAC 6 Clicks Score (PT): 16 (07/18/23 1600)    As of today, 07/19/23  Physical Exam  Constitutional:       Appearance: Normal appearance.   HENT:      Head: Normocephalic and atraumatic.      Right Ear: Tympanic membrane normal.      Left Ear: Tympanic membrane normal.      Nose: Nose normal.      Mouth/Throat:      Mouth: Mucous membranes are moist.   Eyes:      Pupils: Pupils are equal, round, and reactive to light.   Cardiovascular:      Rate and Rhythm: Normal rate and regular rhythm.      Pulses: Normal pulses.      Heart sounds: Normal heart sounds.   Pulmonary:      Effort: Pulmonary effort is normal.       Breath sounds: Normal breath sounds.   Abdominal:      General: Abdomen is flat. Bowel sounds are normal.      Palpations: Abdomen is soft.   Musculoskeletal:         General: Deformity present. Normal range of motion.      Cervical back: Normal range of motion and neck supple.      Left lower leg: Edema present.      Comments: Bilateral Charcot foot, transmetatarsal amputation right foot, deformities left foot.   Skin:     General: Skin is warm and dry.      Capillary Refill: Capillary refill takes less than 2 seconds.   Neurological:      General: No focal deficit present.      Mental Status: He is alert and oriented to person, place, and time.   Psychiatric:         Mood and Affect: Mood normal.         Behavior: Behavior normal.         Thought Content: Thought content normal.         Judgment: Judgment normal.         Results Review:  I have reviewed the labs, radiology results, and diagnostic studies.    Laboratory Data:   Results from last 7 days   Lab Units 07/19/23 0536 07/18/23 0317 07/17/23 0535 07/16/23  0913   SODIUM mmol/L 138 138 136 133*   POTASSIUM mmol/L 3.8 3.8 3.8 4.1   CHLORIDE mmol/L 103 104 103 96*   CO2 mmol/L 26.0 25.0 24.0 20.0*   BUN mg/dL 14 17 17 28*   CREATININE mg/dL 0.92 0.82 0.93 1.16   GLUCOSE mg/dL 174* 157* 125* 242*   CALCIUM mg/dL 8.6 8.3* 8.2* 8.5*   BILIRUBIN mg/dL  --   --   --  1.3*   ALK PHOS U/L  --   --   --  106   ALT (SGPT) U/L  --   --   --  23   AST (SGOT) U/L  --   --   --  17   ANION GAP mmol/L 9.0 9.0 9.0 17.0*     Estimated Creatinine Clearance: 131.2 mL/min (by C-G formula based on SCr of 0.92 mg/dL).          Results from last 7 days   Lab Units 07/19/23 0536 07/18/23 0318 07/17/23 0535 07/16/23  0913   WBC 10*3/mm3 2.55* 2.99* 4.49 5.43   HEMOGLOBIN g/dL 10.3* 9.5* 10.4* 11.3*   HEMATOCRIT % 31.3* 28.8* 31.5* 33.9*   PLATELETS 10*3/mm3 108* 80* 91* 112*     Results from last 7 days   Lab Units 07/16/23  0913   INR  1.46*       Culture Data:   Blood  Culture   Date Value Ref Range Status   07/16/2023 Abnormal Stain (C)  Preliminary   07/16/2023 Gram Negative Bacilli (C)  Preliminary     No results found for: URINECX  No results found for: RESPCX  No results found for: WOUNDCX  No results found for: STOOLCX  No components found for: BODYFLD    Radiology Data:   Imaging Results (Last 24 Hours)       ** No results found for the last 24 hours. **            I have utilized all available immediate resources to obtain, update, or review the patient's current medications (including all prescriptions, over-the-counter products, herbals, cannabis/cannabidiol products, and vitamin/mineral/dietary (nutritional) supplements).       Assessment/Plan     Active Hospital Problems    Diagnosis     **Septicemia due to Enterobacter     Sepsis     Fever, unknown origin        Plan:    Sepsis, most likely infectious source recurrent chronic osteomyelitis, markedly elevated CRP, other possibility could be septic joint or infected hardware, however hardware appears intact on x-rays. Enterobacter cloacae complex, pending susceptibility, on cefepime.  ---In any event, it would require weeks of IV antibiotics, would lean towards completing 6 weeks of IV antibiotics for more definitive treatment of likely recurrent osteomyelitis  --Recurrent Enterobacter bacteremia without source control  --Repeat blood culture to ensure clearance  - Unable to obtain MRI as unable to verify device compatibility  --Follow fever curve  --Lactic acidosis improved with fluids  --Podiatry do not consider intervention  -- Continue skin care     Chronic HFrEF, EF 30 to 35% on echo in June, AICD present  Cautious with any further fluid resuscitation     A-fib  Monitor telemetry  Rate control on metoprolol twice daily  Anticoagulated on Eliquis    Progressive pancytopenia, bone marrow toxicity because of antibiotics, start folic acid, check b12 levels, discontinue cefepime (>10% hematologic reactions and start iv  meropenem, consult hematology.    Type 2 diabetes, hyperglycemia because of sepsis, start basal insulin levemir and follow.     History of DVT/PE  Anticoagulated on Eliquis    Wants a new bed, bigger. Wants in the future home health to IV antibiotics en case he needs them            Medical Decision Making  Number and Complexity of problems: 5  Differential Diagnosis: none    Conditions and Status:        Condition is unchanged.     MDM Data  External documents reviewed: none  My EKG interpretation: sinus tach  My plain film interpretation: dual chamber pacemaker  Tests considered but not ordered: none     Decision rules/scores evaluated (example [id number], [place], etc): 5     Discussed with: patient and nurse staff     Treatment Plan  Dc cefepime  Start meropenem  CT extremity left  levemir    Care Planning  Shared decision making: none  Code status and discussions: full code    Disposition  Social Determinants of Health that impact treatment or disposition: none  I expect the patient to be discharged to home in 7 days.       I confirmed that the patient's Advance Care Plan is present, code status is documented, or surrogate decision maker is listed in the patient's medical record.      This document has been electronically signed by Wai Yu MD on July 19, 2023 08:11 CDT

## 2023-07-19 NOTE — PLAN OF CARE
Goal Outcome Evaluation:  Plan of Care Reviewed With: patient        Progress: improving  Outcome Evaluation: pt sup>sit with min assist, sit>sup with SBA, pt sit<>stand with min assist of 1, pt ambulated 14` x 2 with RW & min assist of 1. pt would benefit from home with asistance & HHPT      Anticipated Discharge Disposition (PT): home with home health, home with assist

## 2023-07-19 NOTE — THERAPY TREATMENT NOTE
Patient Name: Emre Lisa  : 1960    MRN: [id number]                              Today's Date: 2023       Admit Date: 2023    Visit Dx:     ICD-10-CM ICD-9-CM   1. Fever of unknown origin  R50.9 780.60   2. Sepsis due to other etiology  A41.89 038.8   3. Impaired functional mobility, balance, gait, and endurance [Z74.09 (ICD-10-CM)]  Z74.09 V49.89   4. Impaired mobility and activities of daily living [Z74.09, Z78.9 (ICD-10-CM)]  Z74.09 V49.89    Z78.9      Patient Active Problem List   Diagnosis    Foot osteomyelitis, right    Nodule of groin    Type 2 diabetes mellitus with skin complication    Status post transmetatarsal amputation of right foot    Hammer toe of left foot    Hallux malleus of left foot    Cellulitis of left foot    Infected hardware in left leg    Chronic multifocal osteomyelitis of left foot    Charcot's joint of left foot    Skin ulcer of left foot, limited to breakdown of skin    Dyspnea    Syncope    Acute respiratory failure with hypoxia    Tachycardia    Neuropathy due to secondary diabetes mellitus    Atrial fibrillation    Presence of biventricular cardiac pacemaker    Hypertension    Medically complex patient    Fever of unknown origin    Bacteremia    Primary osteoarthritis of knees, bilateral    Sepsis    Fever, unknown origin    Septicemia due to Enterobacter     Past Medical History:   Diagnosis Date    Arthritis     Atrial fibrillation     Diabetes mellitus     Diabetic foot ulcer associated with type 2 diabetes mellitus     left great toe    Hallux malleus of left foot     Hammer toe     Hypertension     MI (myocardial infarction)     Neuropathy in diabetes     Onychomycosis     Presence of biventricular cardiac pacemaker     Rheumatoid arthritis      Past Surgical History:   Procedure Laterality Date    AMPUTATION DIGIT Right 2017    Procedure: RIGHT AMPUTATION TRANSMETATRASAL , RECESSION GASTROCNEMOUS;  Surgeon: Marco A Thompson DPM;  Location:   [facility] OR;  Service:     ANKLE FUSION Left 04/13/2023    Procedure: REDUCTION OF LEFT ANKLE DEFORMITY WITH APPLICATION OF EXTERNAL FIXATOR;  Surgeon: Marco A Thompson DPM;  Location: [facility] OR;  Service: Podiatry;  Laterality: Left;    CARDIAC CATHETERIZATION      CARDIAC DEFIBRILLATOR PLACEMENT  2010, 2016    CARPAL TUNNEL RELEASE  2015    elbow and wrist left arm    FOOT FUSION Left 05/15/2023    Procedure: REMOVAL OF EXTERNAL FIXATOR LEFT LOWER EXTREMITY WITH TIBIAL TALOCALCANEAL ARTHRODESIS  AND ALL INDICATED PROCEDURES;  Surgeon: Marco A Thompson DPM;  Location: [facility] OR;  Service: Podiatry;  Laterality: Left;    FOOT IRRIGATION, DEBRIDEMENT AND REPAIR Left 03/07/2018    Procedure: FOOT IRRIGATION, DEBRIDEMENT AND REPAIR;  Surgeon: Marco A Thompson DPM;  Location: [facility] OR;  Service:     FOOT IRRIGATION, DEBRIDEMENT AND REPAIR Left 03/10/2018    Procedure: FOOT IRRIGATION, DEBRIDEMENT AND REPAIR;  Surgeon: Marco A Thompson DPM;  Location: [facility] OR;  Service: Podiatry    FOOT WOUND CLOSURE Right 03/02/2017    Procedure: INCISION AND DRAINAGE, RIGHT FOOT;  Surgeon: Tung Rosenthal DPM;  Location: [facility] OR;  Service:     HAMMER TOE REPAIR Left 02/15/2018    Procedure: HAMMERTOE CORRECTION LEFT SECOND, THIRD AND FOURTH TOES AND HALLUX INTERPHALANGEAL JOINT ARTHRODESIS LEFT FOOT (Micro Asnis, 4.0 & 5.0 Asnis)      (c-arm);  Surgeon: Marco A Thompson DPM;  Location: [facility] OR;  Service:     HARDWARE REMOVAL Left 03/05/2018    Procedure: ANKLE/FOOT HARDWARE REMOVAL;  Surgeon: Marco A Thompson DPM;  Location: [facility] OR;  Service:     INCISION AND DRAINAGE LEG Left 03/05/2018    Procedure: INCISION AND DRAINAGE LOWER EXTREMITY;  Surgeon: Marco A Thompson DPM;  Location: [facility] OR;  Service:     PLANTAR FASCIA RELEASE Left 11/21/2019    Procedure: PLANTAR PLANING LEFT FOOT AND ALL OTHER INDICATED PROCEDURES;  Surgeon: Marco A Thompson DPM;  Location: [facility] OR;  Service: Podiatry    TOE AMPUTATION Right 2016     TONSILECTOMY, ADENOIDECTOMY, BILATERAL MYRINGOTOMY AND TUBES      age 25      General Information       Row Name 07/19/23 0812          OT Time and Intention    Document Type therapy note (daily note)  -BB     Mode of Treatment individual therapy;occupational therapy  -       Row Name 07/19/23 0812          General Information    Patient Profile Reviewed yes  -BB     Existing Precautions/Restrictions fall  -       Row Name 07/19/23 0812          Cognition    Orientation Status (Cognition) oriented x 4  -       Row Name 07/19/23 0812          Safety Issues, Functional Mobility    Impairments Affecting Function (Mobility) balance;endurance/activity tolerance;range of motion (ROM);shortness of breath;strength  -               User Key  (r) = Recorded By, (t) = Taken By, (c) = Cosigned By      Initials Name Provider Type    BB Paola Fortune COTA Occupational Therapist Assistant                     Mobility/ADL's       Row Name 07/19/23 0812          Bed Mobility    Bed Mobility supine-sit;sit-supine;scooting/bridging  -     Scooting/Bridging [place] (Bed Mobility) --  bridged using R LE for pressure relief  -     Assistive Device (Bed Mobility) bed rails;head of bed elevated  -       Row Name 07/19/23 0812          Transfers    Transfers sit-stand transfer  -       Row Name 07/19/23 0812          Sit-Stand Transfer    Assistive Device (Sit-Stand Transfers) walker, front-wheeled  -[facility] Name 07/19/23 0812          Functional Mobility    Functional Mobility- Ind. Level minimum assist (75% patient effort)  -     Functional Mobility- Device walker, front-wheeled  -       Row Name 07/19/23 0812          Bathing Assessment/Intervention    Assistive Devices (Bathing) long-handled sponge  -[facility] Name 07/19/23 0812          Lower Body Dressing Assessment/Training    [place] Level (Lower Body Dressing) unable to assess  -               User Key  (r) = Recorded By, (t) = Taken  By, (c) = Cosigned By      Initials Name Provider Type    Paola Terrell COTA Occupational Therapist Assistant                   Obj/Interventions       Row Name 07/19/23 0812          Range of Motion Comprehensive    General Range of Motion other (see comments)  -BB       Row Name 07/19/23 0812          Strength Comprehensive (MMT)    General Manual Muscle Testing (MMT) Assessment other (see comments)  -BB       Row Name 07/19/23 0812          Shoulder (Therapeutic Exercise)    Shoulder (Therapeutic Exercise) AROM (active range of motion);strengthening exercise  -BB     Shoulder AROM (Therapeutic Exercise) left;flexion;extension;horizontal aBduction/aDduction;15 repititions;2 sets  chest press  -BB     Shoulder Strengthening (Therapeutic Exercise) 2 lb free weight  -BB       Row Name 07/19/23 0812          Elbow/Forearm (Therapeutic Exercise)    Elbow/Forearm (Therapeutic Exercise) strengthening exercise  -BB     Elbow/Forearm Strengthening (Therapeutic Exercise) bilateral;flexion;extension;supination;pronation;2 lb free weight;15 repititions;2 sets  -BB       Row Name 07/19/23 0812          Wrist (Therapeutic Exercise)    Wrist (Therapeutic Exercise) strengthening exercise  -BB     Wrist Strengthening (Therapeutic Exercise) bilateral;flexion;extension;2 lb free weight;15 repititions;2 sets  -BB       Row Name 07/19/23 0812          Motor Skills    Therapeutic Exercise shoulder;elbow/forearm;wrist  -BB               User Key  (r) = Recorded By, (t) = Taken By, (c) = Cosigned By      Initials Name Provider Type    Paola Terrell COTA Occupational Therapist Assistant                   Goals/Plan       Row Name 07/19/23 0812          Transfer Goal 1 (OT)    Activity/Assistive Device (Transfer Goal 1, OT) transfers, all  -BB     [place] Level/Cues Needed (Transfer Goal 1, OT) modified independence  -BB     Time Frame (Transfer Goal 1, OT) long term goal (LTG)  -BB     Progress/Outcome (Transfer  Goal 1, OT) goal not met  -BB       Row Name 07/19/23 0812          Bathing Goal 1 (OT)    Activity/Device (Bathing Goal 1, OT) bathing skills, all  -BB     [place] Level/Cues Needed (Bathing Goal 1, OT) supervision required  -BB     Time Frame (Bathing Goal 1, OT) long term goal (LTG)  -BB     Progress/Outcomes (Bathing Goal 1, OT) goal not met  -BB       Row Name 07/19/23 0812          Dressing Goal 1 (OT)    Activity/Device (Dressing Goal 1, OT) dressing skills, all  -BB     [place]/Cues Needed (Dressing Goal 1, OT) supervision required  -BB     Time Frame (Dressing Goal 1, OT) long term goal (LTG)  -BB     Progress/Outcome (Dressing Goal 1, OT) goal not met  -BB       Row Name 07/19/23 0812          Toileting Goal 1 (OT)    Activity/Device (Toileting Goal 1, OT) toileting skills, all  -BB     [place] Level/Cues Needed (Toileting Goal 1, OT) modified independence  -BB     Time Frame (Toileting Goal 1, OT) long term goal (LTG)  -BB     Progress/Outcome (Toileting Goal 1, OT) goal not met  -BB               User Key  (r) = Recorded By, (t) = Taken By, (c) = Cosigned By      Initials Name Provider Type    BB Paola Fortune COTA Occupational Therapist Assistant                   Clinical Impression       Row Name 07/19/23 0812          Pain Assessment    Pretreatment Pain Rating 2/10  -BB     Posttreatment Pain Rating 2/10  -BB     Pain Location - neck  -BB     Pain Intervention(s) Declines  -BB       Row Name 07/19/23 0812          Plan of Care Review    Plan of Care Reviewed With patient  -BB     Progress no change  -BB     Outcome Evaluation Pt states he does not want to take a bath until his hospital bed is changed out (HOB limited movement). Pt states he will then get OOB and take a bath. Pt agrees to B UE exercises at this time. Pt completed B UE exercises with 2 lb wt 2 sets x 15 reps with RBs as needed. Pt with limited R shoulder ROM. Pt would benefit from continued OT services  -BB        Row Name 07/19/23 0812          Therapy Assessment/Plan (OT)    Rehab Potential (OT) good, to achieve stated therapy goals  -BB     Criteria for Skilled Therapeutic Interventions Met (OT) yes;meets criteria  -BB     Therapy Frequency (OT) other (see comments)  5-7 days/wk  -BB       Row Name 07/19/23 0812          Therapy Plan Review/Discharge Plan (OT)    Anticipated Discharge Disposition (OT) home with assist;home with 24/7 care;home with home health  -BB       Row Name 07/19/23 0812          Vital Signs    Pre Systolic BP Rehab 131  -BB     Pre Treatment Diastolic BP 73  -BB     Posttreatment Heart Rate (beats/min) 71  -BB     Pre SpO2 (%) 95  -BB     O2 Delivery Pre Treatment room air  -BB     Pre Patient Position Supine  -BB       Row Name 07/19/23 0812          Positioning and Restraints    Pre-Treatment Position in bed  -BB     Post Treatment Position bed  -BB     In Bed fowlers;call light within reach;encouraged to call for assist;exit alarm on  -BB               User Key  (r) = Recorded By, (t) = Taken By, (c) = Cosigned By      Initials Name Provider Type    BB Paola Fortune COTA Occupational Therapist Assistant                   Outcome Measures       Row Name 07/19/23 0812          How much help from another is currently needed...    Putting on and taking off regular lower body clothing? 2  -BB     Bathing (including washing, rinsing, and drying) 2  -BB     Toileting (which includes using toilet bed pan or urinal) 2  -BB     Putting on and taking off regular upper body clothing 3  -BB     Taking care of personal grooming (such as brushing teeth) 4  -BB     Eating meals 4  -BB     AM-PAC 6 Clicks Score (OT) 17  -BB       Row Name 07/19/23 0953          How much help from another person do you currently need...    Turning from your back to your side while in flat bed without using bedrails? 3  -RC     Moving from lying on back to sitting on the side of a flat bed without bedrails? 3  -RC     Moving  to and from a bed to a chair (including a wheelchair)? 3  -RC     Standing up from a chair using your arms (e.g., wheelchair, bedside chair)? 2  -RC     Climbing 3-5 steps with a railing? 2  -RC     To walk in hospital room? 2  -RC     AM-PAC 6 Clicks Score (PT) 15  -RC     Highest level of mobility 4 --> Transferred to chair/commode  -RC               User Key  (r) = Recorded By, (t) = Taken By, (c) = Cosigned By      Initials Name Provider Type    Paola Terrell COTA Occupational Therapist Assistant    RC Eugenie Lamar, RN Registered Nurse                    Occupational Therapy Education       Title: PT OT SLP Therapies (In Progress)       Topic: Occupational Therapy (In Progress)       Point: ADL training (Done)       Description:   Instruct learner(s) on proper safety adaptation and remediation techniques during self care or transfers.   Instruct in proper use of assistive devices.                  Learning Progress Summary             Patient Acceptance, E,TB, VU by BB at 7/19/2023 1337    Acceptance, E,TB, VU by BB at 7/18/2023 1322                         Point: Home exercise program (Not Started)       Description:   Instruct learner(s) on appropriate technique for monitoring, assisting and/or progressing therapeutic exercises/activities.                  Learner Progress:  Not documented in this visit.              Point: Precautions (Done)       Description:   Instruct learner(s) on prescribed precautions during self-care and functional transfers.                  Learning Progress Summary             Patient Acceptance, E, VU by RB at 7/17/2023 1317    Comment: Edu pt on use of gait belt and non skid socks when OOB and no OOB without assist.                         Point: Body mechanics (Done)       Description:   Instruct learner(s) on proper positioning and spine alignment during self-care, functional mobility activities and/or exercises.                  Learning Progress Summary              Patient Acceptance, E,TB, VU by BB at 7/19/2023 1337    Acceptance, E,TB, VU by BB at 7/18/2023 1322                                         User Key       Initials Effective Dates Name Provider Type Discipline    RB 07/11/23 -  Sincere Dyer, OT Occupational Therapist OT    BB 06/16/21 -  Paola Fortune COTA Occupational Therapist Assistant OT                  OT Recommendation and Plan  Therapy Frequency (OT): other (see comments) (5-7 days/wk)  Plan of Care Review  Plan of Care Reviewed With: patient  Progress: no change  Outcome Evaluation: Pt states he does not want to take a bath until his hospital bed is changed out (HOB limited movement). Pt states he will then get OOB and take a bath. Pt agrees to B UE exercises at this time. Pt completed B UE exercises with 2 lb wt 2 sets x 15 reps with RBs as needed. Pt with limited R shoulder ROM. Pt would benefit from continued OT services     Time Calculation:         Time Calculation- OT       Row Name 07/19/23 1338             Time Calculation- OT    OT Start Time 0812  -BB      OT Stop Time 0852  -BB      OT Time Calculation (min) 40 min  -BB      Total Timed Code Minutes- OT 40 minute(s)  -BB      OT Received On 07/19/23  -BB         Timed Charges    45642 - OT Therapeutic Exercise Minutes 40  -BB         Total Minutes    Timed Charges Total Minutes 40  -BB       Total Minutes 40  -BB                User Key  (r) = Recorded By, (t) = Taken By, (c) = Cosigned By      Initials Name Provider Type    BB Paola Fortune COTA Occupational Therapist Assistant                  Therapy Charges for Today       Code Description Service Date Service Provider Modifiers Qty    25496282253 HC OT SELF CARE/MGMT/TRAIN EA 15 MIN 7/18/2023 Paola Fortune COTA GO 4    94215416566 HC OT THER PROC EA 15 MIN 7/19/2023 Paola Fortune COTA GO 3                 ZIGGY Gage  7/19/2023

## 2023-07-19 NOTE — THERAPY TREATMENT NOTE
Acute Care - Physical Therapy Treatment Note  [facility]     Patient Name: Emre Lisa  : 1960  MRN: [id number]  Today's Date: 2023      Visit Dx:     ICD-10-CM ICD-9-CM   1. Fever of unknown origin  R50.9 780.60   2. Sepsis due to other etiology  A41.89 038.8   3. Impaired functional mobility, balance, gait, and endurance [Z74.09 (ICD-10-CM)]  Z74.09 V49.89   4. Impaired mobility and activities of daily living [Z74.09, Z78.9 (ICD-10-CM)]  Z74.09 V49.89    Z78.9      Patient Active Problem List   Diagnosis    Foot osteomyelitis, right    Nodule of groin    Type 2 diabetes mellitus with skin complication    Status post transmetatarsal amputation of right foot    Hammer toe of left foot    Hallux malleus of left foot    Cellulitis of left foot    Infected hardware in left leg    Chronic multifocal osteomyelitis of left foot    Charcot's joint of left foot    Skin ulcer of left foot, limited to breakdown of skin    Dyspnea    Syncope    Acute respiratory failure with hypoxia    Tachycardia    Neuropathy due to secondary diabetes mellitus    Atrial fibrillation    Presence of biventricular cardiac pacemaker    Hypertension    Medically complex patient    Fever of unknown origin    Bacteremia    Primary osteoarthritis of knees, bilateral    Sepsis    Fever, unknown origin    Septicemia due to Enterobacter     Past Medical History:   Diagnosis Date    Arthritis     Atrial fibrillation     Diabetes mellitus     Diabetic foot ulcer associated with type 2 diabetes mellitus     left great toe    Hallux malleus of left foot     Hammer toe     Hypertension     MI (myocardial infarction)     Neuropathy in diabetes     Onychomycosis     Presence of biventricular cardiac pacemaker     Rheumatoid arthritis      Past Surgical History:   Procedure Laterality Date    AMPUTATION DIGIT Right 2017    Procedure: RIGHT AMPUTATION TRANSMETATRASAL , RECESSION GASTROCNEMOUS;  Surgeon: Marco A Thompson DPM;   Location: [facility] OR;  Service:     ANKLE FUSION Left 04/13/2023    Procedure: REDUCTION OF LEFT ANKLE DEFORMITY WITH APPLICATION OF EXTERNAL FIXATOR;  Surgeon: Marco A Thompson DPM;  Location: [facility] OR;  Service: Podiatry;  Laterality: Left;    CARDIAC CATHETERIZATION      CARDIAC DEFIBRILLATOR PLACEMENT  2010, 2016    CARPAL TUNNEL RELEASE  2015    elbow and wrist left arm    FOOT FUSION Left 05/15/2023    Procedure: REMOVAL OF EXTERNAL FIXATOR LEFT LOWER EXTREMITY WITH TIBIAL TALOCALCANEAL ARTHRODESIS  AND ALL INDICATED PROCEDURES;  Surgeon: Marco A Thompson DPM;  Location: [facility] OR;  Service: Podiatry;  Laterality: Left;    FOOT IRRIGATION, DEBRIDEMENT AND REPAIR Left 03/07/2018    Procedure: FOOT IRRIGATION, DEBRIDEMENT AND REPAIR;  Surgeon: Marco A Thompson DPM;  Location: [facility] OR;  Service:     FOOT IRRIGATION, DEBRIDEMENT AND REPAIR Left 03/10/2018    Procedure: FOOT IRRIGATION, DEBRIDEMENT AND REPAIR;  Surgeon: Marco A Thompson DPM;  Location: [facility] OR;  Service: Podiatry    FOOT WOUND CLOSURE Right 03/02/2017    Procedure: INCISION AND DRAINAGE, RIGHT FOOT;  Surgeon: Tung Rosenthal DPM;  Location: [facility] OR;  Service:     HAMMER TOE REPAIR Left 02/15/2018    Procedure: HAMMERTOE CORRECTION LEFT SECOND, THIRD AND FOURTH TOES AND HALLUX INTERPHALANGEAL JOINT ARTHRODESIS LEFT FOOT (Micro Asnis, 4.0 & 5.0 Asnis)      (c-arm);  Surgeon: Marco A Thompson DPM;  Location: [facility] OR;  Service:     HARDWARE REMOVAL Left 03/05/2018    Procedure: ANKLE/FOOT HARDWARE REMOVAL;  Surgeon: Marco A Thompson DPM;  Location: [facility] OR;  Service:     INCISION AND DRAINAGE LEG Left 03/05/2018    Procedure: INCISION AND DRAINAGE LOWER EXTREMITY;  Surgeon: Marco A Thompson DPM;  Location: [facility] OR;  Service:     PLANTAR FASCIA RELEASE Left 11/21/2019    Procedure: PLANTAR PLANING LEFT FOOT AND ALL OTHER INDICATED PROCEDURES;  Surgeon: Marco A Thompson DPM;  Location: [facility] OR;  Service: Podiatry    TOE AMPUTATION Right 2016     TONSILECTOMY, ADENOIDECTOMY, BILATERAL MYRINGOTOMY AND TUBES      age 25     PT Assessment (last 12 hours)       PT Evaluation and Treatment       Row Name 07/19/23 0953          Physical Therapy Time and Intention    Subjective Information complains of;pain  -TA     Document Type therapy note (daily note)  -TA     Mode of Treatment physical therapy  -TA       Row Name 07/19/23 0953          General Information    Patient Profile Reviewed yes  -TA     Existing Precautions/Restrictions fall  -TA       Row Name 07/19/23 0953          Pain    Pretreatment Pain Rating 2/10  -TA     Posttreatment Pain Rating 3/10  -TA     Pain Location - neck  -TA       Row Name 07/19/23 0953          Cognition    Orientation Status (Cognition) oriented x 4  -TA     Personal Safety Interventions fall prevention program maintained;gait belt;muscle strengthening facilitated;nonskid shoes/slippers when out of bed;supervised activity  -TA       Row Name 07/19/23 0953          Range of Motion Comprehensive    General Range of Motion other (see comments)  -TA       Row Name 07/19/23 0953          Strength Comprehensive (MMT)    General Manual Muscle Testing (MMT) Assessment other (see comments)  -TA       Row Name 07/19/23 0953          Bed Mobility    Bed Mobility supine-sit;sit-supine;scooting/bridging  -TA     Supine-Sit [place] (Bed Mobility) minimum assist (75% patient effort)  -TA     Sit-Supine [place] (Bed Mobility) standby assist  -TA     Assistive Device (Bed Mobility) bed rails;head of bed elevated  -TA     Comment, (Bed Mobility) pt sat @ EOB ~25 minutes  with SBA-Independent,  pt completed bathing activities  sitting EOB  -TA       [facility] Name 07/19/23 0953          Transfers    Transfers sit-stand transfer;stand-sit transfer  -TA       [facility] Name 07/19/23 0953          Sit-Stand Transfer    Sit-Stand [place] (Transfers) minimum assist (75% patient effort);moderate assist (50% patient effort)  -TA     Assistive Device  (Sit-Stand Transfers) walker, front-wheeled  -TA       Row Name 07/19/23 0953          Stand-Sit Transfer    Stand-Sit [place] (Transfers) minimum assist (75% patient effort)  -TA     Assistive Device (Stand-Sit Transfers) walker, front-wheeled  -TA       Row Name 07/19/23 0953          Gait/Stairs (Locomotion)    [place] Level (Gait) contact guard;minimum assist (75% patient effort)  -TA     Assistive Device (Gait) walker, front-wheeled  -TA     Distance in Feet (Gait) 14` x 2  -TA       Row Name 07/19/23 0953          Safety Issues, Functional Mobility    Impairments Affecting Function (Mobility) balance;endurance/activity tolerance;range of motion (ROM);shortness of breath;strength  -TA       Row Name             Wound 04/14/23 2157 Left medial gluteal    Wound - Properties Group Placement Date: 04/14/23  -ML Placement Time: 2157  -ML Present on Hospital Admission: Y  -ML, it was passed on in report pt came in with wound, pt stated wound was present on admission  Side: Left  -ML Orientation: medial  -ML Location: gluteal  -ML    Retired Wound - Properties Group Placement Date: 04/14/23  -ML Placement Time: 2157  -ML Present on Hospital Admission: Y  -ML, it was passed on in report pt came in with wound, pt stated wound was present on admission  Side: Left  -ML Orientation: medial  -ML Location: gluteal  -ML    Retired Wound - Properties Group Date first assessed: 04/14/23  -ML Time first assessed: 2157  -ML Present on Hospital Admission: Y  -ML, it was passed on in report pt came in with wound, pt stated wound was present on admission  Side: Left  -ML Location: gluteal  -ML      Row Name             Wound 05/15/23 1546 Left anterior foot Incision    Wound - Properties Group Placement Date: 05/15/23  -HP Placement Time: 1546  -HP Present on Hospital Admission: Y  -HP Side: Left  -HP Orientation: anterior  -HP Location: foot  -HP Primary Wound Type: Incision  -HP Additional Comments: adaptic, bacitracin  ointment, 4x4, abd, webril, ace, boot  -HP    Retired Wound - Properties Group Placement Date: 05/15/23  -HP Placement Time: 1546  -HP Present on Hospital Admission: Y  -HP Side: Left  -HP Orientation: anterior  -HP Location: foot  -HP Primary Wound Type: Incision  -HP Additional Comments: adaptic, bacitracin ointment, 4x4, abd, webril, ace, boot  -HP    Retired Wound - Properties Group Date first assessed: 05/15/23  -HP Time first assessed: 1546  -HP Present on Hospital Admission: Y  -HP Side: Left  -HP Location: foot  -HP Primary Wound Type: Incision  -HP Additional Comments: adaptic, bacitracin ointment, 4x4, abd, webril, ace, boot  -HP      Row Name             Wound 06/03/23 0920 Left lower leg    Wound - Properties Group Placement Date: 06/03/23  -LW Placement Time: 0920  -LW Side: Left  -LW Orientation: lower  -LW Location: leg  -LW    Retired Wound - Properties Group Placement Date: 06/03/23  -LW Placement Time: 0920  -LW Side: Left  -LW Orientation: lower  -LW Location: leg  -LW    Retired Wound - Properties Group Date first assessed: 06/03/23  -LW Time first assessed: 0920  -LW Side: Left  -LW Location: leg  -LW      Row Name             Wound 06/03/23 0922 Left posterior heel    Wound - Properties Group Placement Date: 06/03/23  -LW Placement Time: 0922  -LW Side: Left  -LW Orientation: posterior  -LW Location: heel  -LW    Retired Wound - Properties Group Placement Date: 06/03/23  -LW Placement Time: 0922  -LW Side: Left  -LW Orientation: posterior  -LW Location: heel  -LW    Retired Wound - Properties Group Date first assessed: 06/03/23  -LW Time first assessed: 0922  -LW Side: Left  -LW Location: heel  -LW      Row Name             Wound 06/03/23 0923 Left anterior ankle    Wound - Properties Group Placement Date: 06/03/23  -LW Placement Time: 0923  -LW Side: Left  -LW Orientation: anterior  -LW Location: ankle  -LW    Retired Wound - Properties Group Placement Date: 06/03/23  -LW Placement Time: 0923   -LW Side: Left  -LW Orientation: anterior  -LW Location: ankle  -LW    Retired Wound - Properties Group Date first assessed: 06/03/23  -LW Time first assessed: 0923 -LW Side: Left  -LW Location: ankle  -LW      Row Name 07/19/23 0953          Plan of Care Review    Plan of Care Reviewed With patient  -TA     Progress improving  -TA     Outcome Evaluation pt sup>sit with min assist, sit>sup with SBA, pt sit<>stand with min assist of 1, pt ambulated 14` x 2 with RW & min assist of 1. pt would benefit from home with asistance & HHPT  -TA       Row Name 07/19/23 0953          Vital Signs    Pre Systolic BP Rehab 106  -TA     Pre Treatment Diastolic BP 59  -TA     Post Systolic BP Rehab 131  -TA     Post Treatment Diastolic BP 78  -TA     Pretreatment Heart Rate (beats/min) 75  -TA     Posttreatment Heart Rate (beats/min) 91  -TA     Pre SpO2 (%) 95  -TA     O2 Delivery Pre Treatment room air  -TA     Post SpO2 (%) 96  -TA     O2 Delivery Post Treatment room air  -TA     Pre Patient Position Supine  -TA     Intra Patient Position Supine  -TA       Row Name 07/19/23 0953          Positioning and Restraints    Pre-Treatment Position in bed  -TA     Post Treatment Position bed  -TA     In Bed supine;call light within reach;exit alarm on;LLE elevated  -TA       Row Name 07/19/23 0953          Therapy Assessment/Plan (PT)    Rehab Potential (PT) fair, will monitor progress closely  -TA     Criteria for Skilled Interventions Met (PT) yes;skilled treatment is necessary  -TA     Therapy Frequency (PT) other (see comments)  5-7 days/week  -TA     Comment, Therapy Assessment/Plan (PT) conitnue  -TA       Row Name 07/19/23 0953          Bed Mobility Goal 1 (PT)    Activity/Assistive Device (Bed Mobility Goal 1, PT) sit to supine/supine to sit  -TA     [place] Level/Cues Needed (Bed Mobility Goal 1, PT) independent  -TA     Time Frame (Bed Mobility Goal 1, PT) 30 days  -TA     Progress/Outcomes (Bed Mobility Goal 1, PT) goal  not met  -TA       Row Name 07/19/23 0953          Transfer Goal 1 (PT)    Activity/Assistive Device (Transfer Goal 1, PT) bed-to-chair/chair-to-bed;sit-to-stand/stand-to-sit;walker, rolling  -TA     [place] Level/Cues Needed (Transfer Goal 1, PT) modified independence  -TA     Time Frame (Transfer Goal 1, PT) by discharge  -TA     Progress/Outcome (Transfer Goal 1, PT) goal not met  -TA       Row Name 07/19/23 0953          Gait Training Goal 1 (PT)    Activity/Assistive Device (Gait Training Goal 1, PT) walker, rolling  -TA     [place] Level (Gait Training Goal 1, PT) modified independence  -TA     Distance (Gait Training Goal 1, PT) 25' X 2  -TA     Time Frame (Gait Training Goal 1, PT) by discharge  -TA     Progress/Outcome (Gait Training Goal 1, PT) goal not met  -TA       Row Name 07/19/23 0953          Stairs Goal 1 (PT)    Activity/Assistive Device (Stairs Goal 1, PT) ascending stairs;descending stairs  -TA     [place] Level/Cues Needed (Stairs Goal 1, PT) independent  -TA     Number of Stairs (Stairs Goal 1, PT) 4  -TA     Time Frame (Stairs Goal 1, PT) by discharge  -TA     Progress/Outcome (Stairs Goal 1, PT) goal not met  -TA               User Key  (r) = Recorded By, (t) = Taken By, (c) = Cosigned By      Initials Name Provider Type    Jennifer Diallo RN Registered Nurse    Magda Jimenez, PTA Physical Therapist Assistant    Maryann De La Cruz RN Registered Nurse    Charley Chowdary RN Registered Nurse                    Physical Therapy Education       Title: PT OT SLP Therapies (In Progress)       Topic: Physical Therapy (In Progress)       Point: Mobility training (In Progress)       Learning Progress Summary             Patient Acceptance, E, NR by MM at 7/17/2023 1150    Comment: PT POC and goals. Proper hand positioning for transfers, proper use of FWW for safety.                         Point: Home exercise program (Not Started)       Learner Progress:  Not  documented in this visit.              Point: Body mechanics (Not Started)       Learner Progress:  Not documented in this visit.              Point: Precautions (Not Started)       Learner Progress:  Not documented in this visit.                              User Key       Initials Effective Dates Name Provider Type Discipline     06/26/23 -  Joelle Doan, PT Physical Therapist PT                  PT Recommendation and Plan  Anticipated Discharge Disposition (PT): home with home health, home with assist  Therapy Frequency (PT): other (see comments) (5-7 days/week)  Plan of Care Reviewed With: patient  Progress: improving  Outcome Evaluation: pt sup>sit with min assist, sit>sup with SBA, pt sit<>stand with min assist of 1, pt ambulated 14` x 2 with RW & min assist of 1. pt would benefit from home with asistance & HHPT   Outcome Measures       Row Name 07/19/23 1300 07/18/23 1600 07/17/23 0906       How much help from another person do you currently need...    Turning from your back to your side while in flat bed without using bedrails? 3  -TA 3  -TA --    Moving from lying on back to sitting on the side of a flat bed without bedrails? 3  -TA 3  -TA --    Moving to and from a bed to a chair (including a wheelchair)? 3  -TA 3  -TA --    Standing up from a chair using your arms (e.g., wheelchair, bedside chair)? 3  -TA 3  -TA --    Climbing 3-5 steps with a railing? 2  -TA 2  -TA --    To walk in hospital room? 3  -TA 2  -TA --    AM-PAC 6 Clicks Score (PT) 17  -TA 16  -TA --       How much help from another is currently needed...    Putting on and taking off regular lower body clothing? -- -- 2  -RB    Bathing (including washing, rinsing, and drying) -- -- 2  -RB    Toileting (which includes using toilet bed pan or urinal) -- -- 2  -RB    Putting on and taking off regular upper body clothing -- -- 3  -RB    Taking care of personal grooming (such as brushing teeth) -- -- 4  -RB    Eating meals -- -- 4  -RB     AM-PAC 6 Clicks Score (OT) -- -- 17  -RB       Functional Assessment    Outcome Measure Options AM-PAC 6 Clicks Basic Mobility (PT)  -TA AM-PAC 6 Clicks Basic Mobility (PT)  -TA AM-PAC 6 Clicks Daily Activity (OT)  -RB              User Key  (r) = Recorded By, (t) = Taken By, (c) = Cosigned By      Initials Name Provider Type    RB Sincere Dyer, OT Occupational Therapist    Magda Jimenez PTA Physical Therapist Assistant                     Time Calculation:    PT Charges       Row Name 07/19/23 1343             Time Calculation    Start Time 0953  -TA      Stop Time 1052  -TA      Time Calculation (min) 59 min  -TA      PT Received On 07/19/23  -TA         Time Calculation- PT    Total Timed Code Minutes- PT 59 minute(s)  -TA         Timed Charges    54742 - PT Therapeutic Activity Minutes 59  -TA         Total Minutes    Timed Charges Total Minutes 59  -TA       Total Minutes 59  -TA                User Key  (r) = Recorded By, (t) = Taken By, (c) = Cosigned By      Initials Name Provider Type    Magda Jimenez PTA Physical Therapist Assistant                  Therapy Charges for Today       Code Description Service Date Service Provider Modifiers Qty    18709323485 HC PT THER PROC EA 15 MIN 7/18/2023 Magda Rader, CHENTE GP 2    01701007303 HC PT THERAPEUTIC ACT EA 15 MIN 7/18/2023 Magda Rader, CHENTE GP 1    88575188386 HC PT THERAPEUTIC ACT EA 15 MIN 7/19/2023 Magda Rader PTA GP 4            PT G-Codes  Outcome Measure Options: AM-PAC 6 Clicks Basic Mobility (PT)  AM-PAC 6 Clicks Score (PT): 17  AM-PAC 6 Clicks Score (OT): 17    Magda Rader PTA  7/19/2023

## 2023-07-20 LAB
ANION GAP SERPL CALCULATED.3IONS-SCNC: 10 MMOL/L (ref 5–15)
BACTERIA SPEC AEROBE CULT: ABNORMAL
BACTERIA SPEC AEROBE CULT: ABNORMAL
BASOPHILS # BLD AUTO: 0.01 10*3/MM3 (ref 0–0.2)
BASOPHILS NFR BLD AUTO: 0.3 % (ref 0–1.5)
BILIRUB UR QL STRIP: NEGATIVE
BUN SERPL-MCNC: 14 MG/DL (ref 8–23)
BUN/CREAT SERPL: 15.6 (ref 7–25)
CALCIUM SPEC-SCNC: 8.9 MG/DL (ref 8.6–10.5)
CHLORIDE SERPL-SCNC: 100 MMOL/L (ref 98–107)
CLARITY UR: ABNORMAL
CO2 SERPL-SCNC: 28 MMOL/L (ref 22–29)
COLOR UR: YELLOW
CREAT SERPL-MCNC: 0.9 MG/DL (ref 0.76–1.27)
CYTOLOGIST CVX/VAG CYTO: NORMAL
DEPRECATED RDW RBC AUTO: 49.9 FL (ref 37–54)
EGFRCR SERPLBLD CKD-EPI 2021: 97.2 ML/MIN/1.73
EOSINOPHIL # BLD AUTO: 0.13 10*3/MM3 (ref 0–0.4)
EOSINOPHIL NFR BLD AUTO: 4.1 % (ref 0.3–6.2)
ERYTHROCYTE [DISTWIDTH] IN BLOOD BY AUTOMATED COUNT: 16.1 % (ref 12.3–15.4)
FERRITIN SERPL-MCNC: 366.5 NG/ML (ref 30–400)
FOLATE SERPL-MCNC: >20 NG/ML (ref 4.78–24.2)
GLUCOSE BLDC GLUCOMTR-MCNC: 156 MG/DL (ref 70–130)
GLUCOSE BLDC GLUCOMTR-MCNC: 197 MG/DL (ref 70–130)
GLUCOSE BLDC GLUCOMTR-MCNC: 207 MG/DL (ref 70–130)
GLUCOSE BLDC GLUCOMTR-MCNC: 225 MG/DL (ref 70–130)
GLUCOSE SERPL-MCNC: 175 MG/DL (ref 65–99)
GLUCOSE UR STRIP-MCNC: NEGATIVE MG/DL
GRAM STN SPEC: ABNORMAL
GRAM STN SPEC: ABNORMAL
HCT VFR BLD AUTO: 32.4 % (ref 37.5–51)
HGB BLD-MCNC: 10.8 G/DL (ref 13–17.7)
HGB UR QL STRIP.AUTO: NEGATIVE
IMM GRANULOCYTES # BLD AUTO: 0.01 10*3/MM3 (ref 0–0.05)
IMM GRANULOCYTES NFR BLD AUTO: 0.3 % (ref 0–0.5)
IRON 24H UR-MRATE: 72 MCG/DL (ref 59–158)
IRON SATN MFR SERPL: 21 % (ref 20–50)
ISOLATED FROM: ABNORMAL
ISOLATED FROM: ABNORMAL
KETONES UR QL STRIP: NEGATIVE
LEUKOCYTE ESTERASE UR QL STRIP.AUTO: NEGATIVE
LYMPHOCYTES # BLD AUTO: 1.1 10*3/MM3 (ref 0.7–3.1)
LYMPHOCYTES NFR BLD AUTO: 34.7 % (ref 19.6–45.3)
MCH RBC QN AUTO: 28.1 PG (ref 26.6–33)
MCHC RBC AUTO-ENTMCNC: 33.3 G/DL (ref 31.5–35.7)
MCV RBC AUTO: 84.2 FL (ref 79–97)
MONOCYTES # BLD AUTO: 0.38 10*3/MM3 (ref 0.1–0.9)
MONOCYTES NFR BLD AUTO: 12 % (ref 5–12)
NEUTROPHILS NFR BLD AUTO: 1.54 10*3/MM3 (ref 1.7–7)
NEUTROPHILS NFR BLD AUTO: 48.6 % (ref 42.7–76)
NITRITE UR QL STRIP: NEGATIVE
NRBC BLD AUTO-RTO: 0 /100 WBC (ref 0–0.2)
PATH INTERP BLD-IMP: NORMAL
PH UR STRIP.AUTO: 6 [PH] (ref 5–9)
PLATELET # BLD AUTO: 123 10*3/MM3 (ref 140–450)
PMV BLD AUTO: 10.2 FL (ref 6–12)
POTASSIUM SERPL-SCNC: 3.9 MMOL/L (ref 3.5–5.2)
PROT UR QL STRIP: NEGATIVE
RBC # BLD AUTO: 3.85 10*6/MM3 (ref 4.14–5.8)
SODIUM SERPL-SCNC: 138 MMOL/L (ref 136–145)
SP GR UR STRIP: 1.01 (ref 1–1.03)
TIBC SERPL-MCNC: 335 MCG/DL (ref 298–536)
TRANSFERRIN SERPL-MCNC: 225 MG/DL (ref 200–360)
UROBILINOGEN UR QL STRIP: ABNORMAL
VIT B12 BLD-MCNC: 390 PG/ML (ref 211–946)
WBC NRBC COR # BLD: 3.17 10*3/MM3 (ref 3.4–10.8)

## 2023-07-20 PROCEDURE — 83540 ASSAY OF IRON: CPT | Performed by: INTERNAL MEDICINE

## 2023-07-20 PROCEDURE — 63710000001 INSULIN DETEMIR PER 5 UNITS

## 2023-07-20 PROCEDURE — 97530 THERAPEUTIC ACTIVITIES: CPT

## 2023-07-20 PROCEDURE — 82746 ASSAY OF FOLIC ACID SERUM: CPT | Performed by: INTERNAL MEDICINE

## 2023-07-20 PROCEDURE — 25010000002 MEROPENEM PER 100 MG

## 2023-07-20 PROCEDURE — 97116 GAIT TRAINING THERAPY: CPT

## 2023-07-20 PROCEDURE — 81003 URINALYSIS AUTO W/O SCOPE: CPT | Performed by: PODIATRIST

## 2023-07-20 PROCEDURE — 82607 VITAMIN B-12: CPT | Performed by: INTERNAL MEDICINE

## 2023-07-20 PROCEDURE — 85025 COMPLETE CBC W/AUTO DIFF WBC: CPT

## 2023-07-20 PROCEDURE — 80048 BASIC METABOLIC PNL TOTAL CA: CPT | Performed by: STUDENT IN AN ORGANIZED HEALTH CARE EDUCATION/TRAINING PROGRAM

## 2023-07-20 PROCEDURE — 93005 ELECTROCARDIOGRAM TRACING: CPT | Performed by: PHYSICIAN ASSISTANT

## 2023-07-20 PROCEDURE — 84466 ASSAY OF TRANSFERRIN: CPT | Performed by: INTERNAL MEDICINE

## 2023-07-20 PROCEDURE — 93010 ELECTROCARDIOGRAM REPORT: CPT | Performed by: INTERNAL MEDICINE

## 2023-07-20 PROCEDURE — 97535 SELF CARE MNGMENT TRAINING: CPT

## 2023-07-20 PROCEDURE — 82728 ASSAY OF FERRITIN: CPT | Performed by: INTERNAL MEDICINE

## 2023-07-20 PROCEDURE — 82948 REAGENT STRIP/BLOOD GLUCOSE: CPT

## 2023-07-20 PROCEDURE — 99232 SBSQ HOSP IP/OBS MODERATE 35: CPT | Performed by: INTERNAL MEDICINE

## 2023-07-20 PROCEDURE — 85060 BLOOD SMEAR INTERPRETATION: CPT | Performed by: INTERNAL MEDICINE

## 2023-07-20 RX ADMIN — THERA TABS 1 TABLET: TAB at 20:37

## 2023-07-20 RX ADMIN — APIXABAN 5 MG: 5 TABLET, FILM COATED ORAL at 20:37

## 2023-07-20 RX ADMIN — Medication 400 MG: at 08:47

## 2023-07-20 RX ADMIN — DRONEDARONE 400 MG: 400 TABLET, FILM COATED ORAL at 20:36

## 2023-07-20 RX ADMIN — CETIRIZINE HYDROCHLORIDE 10 MG: 10 TABLET, FILM COATED ORAL at 20:37

## 2023-07-20 RX ADMIN — MEROPENEM 1000 MG: 1 INJECTION, POWDER, FOR SOLUTION INTRAVENOUS at 13:27

## 2023-07-20 RX ADMIN — DOCUSATE SODIUM 50 MG AND SENNOSIDES 8.6 MG 2 TABLET: 8.6; 5 TABLET, FILM COATED ORAL at 20:37

## 2023-07-20 RX ADMIN — METOPROLOL SUCCINATE 25 MG: 25 TABLET, FILM COATED, EXTENDED RELEASE ORAL at 08:47

## 2023-07-20 RX ADMIN — FLUTICASONE PROPIONATE 2 SPRAY: 50 SPRAY, METERED NASAL at 08:49

## 2023-07-20 RX ADMIN — Medication 10 ML: at 20:39

## 2023-07-20 RX ADMIN — Medication 5000 UNITS: at 20:36

## 2023-07-20 RX ADMIN — Medication 10 ML: at 08:48

## 2023-07-20 RX ADMIN — APIXABAN 5 MG: 5 TABLET, FILM COATED ORAL at 08:47

## 2023-07-20 RX ADMIN — INSULIN DETEMIR 10 UNITS: 100 INJECTION, SOLUTION SUBCUTANEOUS at 20:36

## 2023-07-20 RX ADMIN — DOCUSATE SODIUM 50 MG AND SENNOSIDES 8.6 MG 2 TABLET: 8.6; 5 TABLET, FILM COATED ORAL at 08:48

## 2023-07-20 RX ADMIN — Medication 400 MG: at 20:37

## 2023-07-20 RX ADMIN — FOLIC ACID 5 MG: 1 TABLET ORAL at 08:47

## 2023-07-20 RX ADMIN — METOPROLOL SUCCINATE 25 MG: 25 TABLET, FILM COATED, EXTENDED RELEASE ORAL at 20:37

## 2023-07-20 RX ADMIN — OXYCODONE HYDROCHLORIDE AND ACETAMINOPHEN 1000 MG: 500 TABLET ORAL at 20:37

## 2023-07-20 RX ADMIN — MEROPENEM 1000 MG: 1 INJECTION, POWDER, FOR SOLUTION INTRAVENOUS at 06:00

## 2023-07-20 RX ADMIN — MEROPENEM 1000 MG: 1 INJECTION, POWDER, FOR SOLUTION INTRAVENOUS at 23:10

## 2023-07-20 NOTE — PLAN OF CARE
Problem: Adult Inpatient Plan of Care  Goal: Plan of Care Review  Outcome: Ongoing, Progressing  Flowsheets  Taken 7/20/2023 1448  Progress: improving  Plan of Care Reviewed With: patient  Taken 7/20/2023 1444  Progress: improving  Plan of Care Reviewed With: patient  Outcome Evaluation: Pt supine in bed . Supine to sit Mod A. Sit to supine SBA. Sit to stand, t/f to bathroom SBA. Toileting Mod I. Toilet tasks Mod I. Grooming Mod I. Patient sitting fowlers postition in bed. All needs in reach. Pt would benefit from continued OT services.   Goal Outcome Evaluation:  Plan of Care Reviewed With: patient        Progress: improving  Outcome Evaluation: Pt supine in bed . Supine to sit Mod A. Sit to supine SBA. Sit to stand, t/f to bathroom SBA. Toileting Mod I. Toilet tasks Mod I. Grooming Mod I. Patient sitting fowlers postition in bed. All needs in reach. Pt would benefit from continued OT services.

## 2023-07-20 NOTE — PROGRESS NOTES
Subjective     Emre Lisa was seen in follow up.   Overall stable.   No fever or chills.   Feels better.     Past Medical History, Past Surgical History, Social History, Family History have been reviewed and are without significant changes except as mentioned.    Review of Systems   Constitutional:  Positive for activity change and fatigue. Negative for fever and unexpected weight change.   HENT:  Negative for congestion, hearing loss, sore throat and voice change.    Respiratory:  Negative for cough, chest tightness, shortness of breath and wheezing.    Cardiovascular:  Positive for leg swelling. Negative for chest pain and palpitations.   Gastrointestinal:  Negative for abdominal distention, abdominal pain, constipation, diarrhea, nausea and vomiting.   Endocrine: Negative for cold intolerance, heat intolerance and polydipsia.   Genitourinary:  Negative for difficulty urinating, dysuria and hematuria.   Musculoskeletal:  Positive for arthralgias, back pain and gait problem.   Skin:  Negative for color change, pallor and rash.   Neurological:  Positive for numbness. Negative for dizziness, weakness and light-headedness.   Hematological:  Negative for adenopathy. Bruises/bleeds easily.   Psychiatric/Behavioral:  Negative for agitation, confusion, dysphoric mood and hallucinations.           Medications:  The current medication list was reviewed in the EMR    ALLERGIES:    Allergies   Allergen Reactions    Heparin Other (See Comments)     Heparin induce thrombocytopenia     Januvia [Sitagliptin] Hives    Lipitor [Atorvastatin] Other (See Comments)     Muscle Cramps...    Shellfish Allergy Swelling and Other (See Comments)     Age 13 this occurred doesn't remember what he ate swelled lips and face    Sulfa Antibiotics Rash     Pt was age of 4 when he was told by his family he had a reaction, pt is unsure        Objective      Vitals:    07/20/23 0322 07/20/23 0734 07/20/23 0845 07/20/23 1500   BP: 151/95   135/74 124/67   BP Location: Right arm  Right arm Right arm   Patient Position: Lying  Lying Lying   Pulse: 75 73 74 75   Resp: 18  18 18   Temp: 98 °F (36.7 °C)  97.7 °F (36.5 °C) 97.6 °F (36.4 °C)   TempSrc: Temporal  Temporal Infrared   SpO2: 97%  97% 97%   Weight:       Height:              No data to display                Physical Exam  Vitals and nursing note reviewed.   Constitutional:       General: He is not in acute distress.     Appearance: He is obese.   HENT:      Mouth/Throat:      Mouth: Mucous membranes are moist.      Pharynx: No oropharyngeal exudate.   Eyes:      General: No scleral icterus.     Pupils: Pupils are equal, round, and reactive to light.   Cardiovascular:      Rate and Rhythm: Normal rate and regular rhythm.      Heart sounds: No murmur heard.  Pulmonary:      Effort: Pulmonary effort is normal. No respiratory distress.      Breath sounds: Normal breath sounds.   Abdominal:      General: There is no distension.      Palpations: Abdomen is soft.      Tenderness: There is no abdominal tenderness.   Musculoskeletal:      Right lower leg: No edema.      Left lower leg: Edema present.   Skin:     General: Skin is dry.      Coloration: Skin is not pale.      Findings: Bruising present.   Neurological:      General: No focal deficit present.      Mental Status: He is alert and oriented to person, place, and time. Mental status is at baseline.   Psychiatric:         Mood and Affect: Mood normal.         Behavior: Behavior normal.         Thought Content: Thought content normal.             RECENT LABS: Independently reviewed and summarized  Hematology WBC   Date Value Ref Range Status   07/20/2023 3.17 (L) 3.40 - 10.80 10*3/mm3 Final     RBC   Date Value Ref Range Status   07/20/2023 3.85 (L) 4.14 - 5.80 10*6/mm3 Final     Hemoglobin   Date Value Ref Range Status   07/20/2023 10.8 (L) 13.0 - 17.7 g/dL Final     Hematocrit   Date Value Ref Range Status   07/20/2023 32.4 (L) 37.5 - 51.0 %  Final     Platelets   Date Value Ref Range Status   07/20/2023 123 (L) 140 - 450 10*3/mm3 Final       LAB RESULTS:      Lab 07/20/23  0540 07/19/23  0536 07/18/23 0318 07/17/23  0535 07/17/23  0000 07/16/23  1727 07/16/23  1436 07/16/23  1206 07/16/23  0913   WBC 3.17* 2.55* 2.99* 4.49  --   --   --   --  5.43   HEMOGLOBIN 10.8* 10.3* 9.5* 10.4*  --   --   --   --  11.3*   HEMATOCRIT 32.4* 31.3* 28.8* 31.5*  --   --   --   --  33.9*   PLATELETS 123* 108* 80* 91*  --   --   --   --  112*   NEUTROS ABS 1.54* 1.12* 1.97 3.23  --   --   --   --  4.77   IMMATURE GRANS (ABS) 0.01 0.01  --   --   --   --   --   --  0.03   LYMPHS ABS 1.10 0.89  --   --   --   --   --   --  0.43*   MONOS ABS 0.38 0.40  --   --   --   --   --   --  0.19   EOS ABS 0.13 0.11 0.06  --   --   --   --   --  0.00   MCV 84.2 85.3 85.5 85.6  --   --   --   --  84.8   SED RATE  --   --   --  23*  --   --   --   --   --    CRP  --   --   --   --   --   --   --   --  11.00*   LACTATE  --   --   --   --  1.0 2.3* 2.5* 2.5* 4.7*   PROTIME  --   --   --   --   --   --   --   --  17.6*         Lab 07/20/23  0540 07/19/23 0536 07/18/23 0317 07/17/23  0535 07/16/23  0913   SODIUM 138 138 138 136 133*   POTASSIUM 3.9 3.8 3.8 3.8 4.1   CHLORIDE 100 103 104 103 96*   CO2 28.0 26.0 25.0 24.0 20.0*   ANION GAP 10.0 9.0 9.0 9.0 17.0*   BUN 14 14 17 17 28*   CREATININE 0.90 0.92 0.82 0.93 1.16   EGFR 97.2 94.6 99.9 93.4 71.7   GLUCOSE 175* 174* 157* 125* 242*   CALCIUM 8.9 8.6 8.3* 8.2* 8.5*   TSH  --   --   --   --  0.439         Lab 07/16/23  0913   TOTAL PROTEIN 7.2   ALBUMIN 3.8   GLOBULIN 3.4   ALT (SGPT) 23   AST (SGOT) 17   BILIRUBIN 1.3*   ALK PHOS 106         Lab 07/16/23  0913   HSTROP T 30*   PROTIME 17.6*   INR 1.46*             Lab 07/20/23  0540   IRON 72   IRON SATURATION (TSAT) 21   TIBC 335   TRANSFERRIN 225   FERRITIN 366.50   FOLATE >20.00   VITAMIN B 12 390         Brief Urine Lab Results  (Last result in the past 365 days)        Color    Clarity   Blood   Leuk Est   Nitrite   Protein   CREAT   Urine HCG        07/20/23 0558 Yellow   Cloudy   Negative   Negative   Negative   Negative                 Microbiology Results (last 10 days)       Procedure Component Value - Date/Time    Blood Culture - Blood, Arm, Right [[id number]]  (Normal) Collected: 07/19/23 0947    Lab Status: Preliminary result Specimen: Blood from Arm, Right Updated: 07/20/23 1001     Blood Culture No growth at 24 hours    Blood Culture - Blood, Hand, Right [[id number]]  (Normal) Collected: 07/19/23 0947    Lab Status: Preliminary result Specimen: Blood from Hand, Right Updated: 07/20/23 1001     Blood Culture No growth at 24 hours    Respiratory Panel PCR w/COVID-19(SARS-CoV-2) [facility]/S[facility]I/ALFA/PAD/[facility] In-House, NP Swab in UTM/VTM, 3-4 HR TAT - Swab, Nasopharynx [[id number]]  (Normal) Collected: 07/16/23 1035    Lab Status: Final result Specimen: Swab from Nasopharynx Updated: 07/16/23 1135     ADENOVIRUS, PCR Not Detected     Coronavirus 229E Not Detected     Coronavirus HKU1 Not Detected     Coronavirus NL63 Not Detected     Coronavirus OC43 Not Detected     COVID19 Not Detected     Human Metapneumovirus Not Detected     Human Rhinovirus/Enterovirus Not Detected     Influenza A PCR Not Detected     Influenza B PCR Not Detected     Parainfluenza Virus 1 Not Detected     Parainfluenza Virus 2 Not Detected     Parainfluenza Virus 3 Not Detected     Parainfluenza Virus 4 Not Detected     RSV, PCR Not Detected     Bordetella pertussis pcr Not Detected     Bordetella parapertussis PCR Not Detected     Chlamydophila pneumoniae PCR Not Detected     Mycoplasma pneumo by PCR Not Detected    Narrative:      In the setting of a positive respiratory panel with a viral infection PLUS a negative procalcitonin without other underlying concern for bacterial infection, consider observing off antibiotics or discontinuation of antibiotics and continue supportive care. If the respiratory panel is  positive for atypical bacterial infection (Bordetella pertussis, Chlamydophila pneumoniae, or Mycoplasma pneumoniae), consider antibiotic de-escalation to target atypical bacterial infection.    Blood Culture - Blood, Arm, Left [[id number]]  (Abnormal) Collected: 07/16/23 1001    Lab Status: Final result Specimen: Blood from Arm, Left Updated: 07/20/23 0530     Blood Culture Enterobacter cloacae complex     Isolated from Aerobic Bottle     Gram Stain Aerobic Bottle Gram negative bacilli     Comment: 2nd bottle positive          Narrative:      Refer to previous blood culture collected on 07/16/2023 1052 for MICs      Blood Culture - Blood, Arm, Left [[id number]]  (Abnormal)  (Susceptibility) Collected: 07/16/23 0952    Lab Status: Final result Specimen: Blood from Arm, Left Updated: 07/20/23 0518     Blood Culture Enterobacter cloacae complex     Comment:   This organism may develop resistance during prolonged therapy with 3rd generation cephalosporins as a result of de-repression of AmpC B-lactamase. Testing repeat isolates may be warranted if clinical status fails to improve.        Isolated from Anaerobic Bottle     Gram Stain Anaerobic Bottle Gram negative bacilli    Susceptibility        Enterobacter cloacae complex      MARTINA      Cefepime Susceptible      Ceftazidime Susceptible      Ceftriaxone Susceptible      Gentamicin Susceptible      Levofloxacin Susceptible      Piperacillin + Tazobactam Susceptible      Trimethoprim + Sulfamethoxazole Susceptible                       Susceptibility Comments       Enterobacter cloacae complex    Cefazolin sensitivity will not be reported for Enterobacteriaceae in non-urine isolates. If cefazolin is preferred, please call the microbiology lab to request an E-test.  This organism may develop resistance during prolonged therapy with 3rd-generation cephalosporins (e.g. ceftriaxone) as a result of de-repression of AmpC B-lactamase. Testing repeat isolates or an ID consult may  be warranted if clinical status fails to improve.  With the exception of urinary-sourced infections, aminoglycosides should not be used as monotherapy.               Blood Culture ID, PCR - Blood, Arm, Left [[id number]]  (Abnormal) Collected: 07/16/23 0952    Lab Status: Final result Specimen: Blood from Arm, Left Updated: 07/18/23 0451     BCID, PCR Enterobacter cloacae complex. Identification by BCID2 PCR.    Narrative:      No resistance genes detected.               Diagnosis:   (1) Neutropenia   (2) Anemia   (3) Thrombocytopenia   (4) Heparin-induced thrombocytopenia  (5) Bilateral PE   (6) Bilateral DVT   (7) Sepsis     Assessment & Plan     (1) Neutropenia     Neutropenia likely secondary to sepsis/antibiotic related.  Absolute neutrophil count is improving.  B12 folic acid was normal.  No further fever or chills.  No role for growth factor support.  Continue to monitor.    (2) Anemia     Suspect anemia related to chronic illness.  Hemoglobin has remained stable.  No bleeding.  Continue monitor.    (3) Thrombocytopenia     Thrombocytopenia slowly improving.  Platelet count is 123,000.  This dropped to 80,000 and now slowly improving.  Likely secondary to sepsis antibiotic.  No bleeding.  No heparin exposure.  Continue monitor.    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  He is currently on Eliquis.  No heparin exposure since then.  Heparin has been listed as allergy.  Please avoid all heparin products.  Continue Eliquis.  Monitor for bleeding closely.      (7) Sepsis     Patient with Enterobacter bacteremia.  He is currently on IV antibiotic.  Source unclear.  Management per primary team.      7/20/2023      CC:

## 2023-07-20 NOTE — PROGRESS NOTES
[facility] Medicine Services  INPATIENT PROGRESS NOTE    Length of Stay: 3  Date of Admission: 7/16/2023  Primary Care Physician: Jamaal Bravo MD    Subjective   Chief Complaint: diabetic foot  HPI:  63-year-old male with diabetes mellitus with chronic left foot ulcer and osteomyelitis of left foot, Charcot joint, s/p removal of external fixator with tibial talocalcaneal arthrodesis in May 2023, followed by podiatry with LLE wrapped in compression boot, recently hospitalized with sepsis, fever with Enterobacter bacteremia treated with Zosyn.  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     He presented back after developing fevers/chills, had sinus tachycardia, lactic acidosis, elevated CRP, admitted with sepsis.  Empirically treated with broad-spectrum antibiotics and fluid resuscitation.   There was no clear evidence of superficial soft tissue infection, although x-rays of lower extremity did show soft tissue swelling, otherwise with previous fusion with intact hardware.   He was subsequently found to have recurrence of Enterobacter cloace bacteremia.  Given known issues with chronic osteomyelitis and significantly elevated CRP, most likely source of bacteremia would be related to this.  Vancomycin discontinued and continued on IV cefepime pending further culture sensitivities. Progressive pancytopenia, probable bone marrow toxicity, will change antibiotic. MRI was not possible to do because of pacemaker compatibility. Will order CT left foot and leg.     7/20/2023: Patient doing well today.  Up working with PT.  Discussed negative CT results with patient having only severe Charcot which was a known issue.  Discussed discharge planning, patient does not feel that he will be able  to do the IV antibiotics at home due to dexterity issues with his hands and is not agreeable to rehab.  Will discuss possible LTAC versus outpatient antibiotic therapy with case management/pharmacy.      As of today, 07/20/23  Review of Systems   Constitutional:  Positive for chills and diaphoresis. Negative for activity change, appetite change and fatigue.   HENT:  Negative for congestion, ear discharge, hearing loss, rhinorrhea, sinus pain, sneezing and sore throat.    Eyes:  Negative for photophobia, discharge and visual disturbance.   Respiratory:  Negative for cough, chest tightness, shortness of breath and wheezing.    Cardiovascular:  Negative for chest pain and palpitations.   Gastrointestinal:  Negative for abdominal distention, abdominal pain, blood in stool, diarrhea, nausea and vomiting.   Endocrine: Negative for cold intolerance, heat intolerance, polydipsia, polyphagia and polyuria.   Genitourinary:  Negative for dysuria, flank pain, hematuria and urgency.   Musculoskeletal:  Negative for arthralgias, joint swelling and myalgias.   Skin:  Negative for color change.   Allergic/Immunologic: Negative for food allergies.   Neurological:  Negative for dizziness, seizures, syncope, speech difficulty, weakness and headaches.   Hematological:  Negative for adenopathy. Does not bruise/bleed easily.   Psychiatric/Behavioral:  Negative for confusion, hallucinations, self-injury and suicidal ideas. The patient is not nervous/anxious.       All pertinent negatives and positives are as above. All other systems have been reviewed and are negative unless otherwise stated.    Objective    Temp:  [97.1 °F (36.2 °C)-98.1 °F (36.7 °C)] 97.7 °F (36.5 °C)  Heart Rate:  [72-84] 74  Resp:  [18-19] 18  BP: (134-151)/(70-95) 135/74    AM-PAC 6 Clicks Score (PT): 18 (07/19/23 2003)    As of today, 07/20/23  Physical Exam  Constitutional:       Appearance: Normal appearance.   HENT:      Head: Normocephalic and atraumatic.       Right Ear: Tympanic membrane normal.      Left Ear: Tympanic membrane normal.      Nose: Nose normal.      Mouth/Throat:      Mouth: Mucous membranes are moist.   Eyes:      Pupils: Pupils are equal, round, and reactive to light.   Cardiovascular:      Rate and Rhythm: Normal rate and regular rhythm.      Pulses: Normal pulses.      Heart sounds: Normal heart sounds.   Pulmonary:      Effort: Pulmonary effort is normal.      Breath sounds: Normal breath sounds.   Abdominal:      General: Abdomen is flat. Bowel sounds are normal.      Palpations: Abdomen is soft.   Musculoskeletal:         General: Deformity present. Normal range of motion.      Cervical back: Normal range of motion and neck supple.      Left lower leg: Edema present.      Comments: Bilateral Charcot foot, transmetatarsal amputation right foot, deformities left foot.   Skin:     General: Skin is warm and dry.      Capillary Refill: Capillary refill takes less than 2 seconds.   Neurological:      General: No focal deficit present.      Mental Status: He is alert and oriented to person, place, and time.   Psychiatric:         Mood and Affect: Mood normal.         Behavior: Behavior normal.         Thought Content: Thought content normal.         Judgment: Judgment normal.         Results Review:  I have reviewed the labs, radiology results, and diagnostic studies.    Laboratory Data:   Results from last 7 days   Lab Units 07/20/23  0540 07/19/23  0536 07/18/23  0317 07/17/23  0535 07/16/23  0913   SODIUM mmol/L 138 138 138   < > 133*   POTASSIUM mmol/L 3.9 3.8 3.8   < > 4.1   CHLORIDE mmol/L 100 103 104   < > 96*   CO2 mmol/L 28.0 26.0 25.0   < > 20.0*   BUN mg/dL 14 14 17   < > 28*   CREATININE mg/dL 0.90 0.92 0.82   < > 1.16   GLUCOSE mg/dL 175* 174* 157*   < > 242*   CALCIUM mg/dL 8.9 8.6 8.3*   < > 8.5*   BILIRUBIN mg/dL  --   --   --   --  1.3*   ALK PHOS U/L  --   --   --   --  106   ALT (SGPT) U/L  --   --   --   --  23   AST (SGOT) U/L  --   --    --   --  17   ANION GAP mmol/L 10.0 9.0 9.0   < > 17.0*    < > = values in this interval not displayed.       Estimated Creatinine Clearance: 134.1 mL/min (by C-G formula based on SCr of 0.9 mg/dL).          Results from last 7 days   Lab Units 07/20/23  0540 07/19/23  0536 07/18/23  0318 07/17/23  0535 07/16/23  0913   WBC 10*3/mm3 3.17* 2.55* 2.99* 4.49 5.43   HEMOGLOBIN g/dL 10.8* 10.3* 9.5* 10.4* 11.3*   HEMATOCRIT % 32.4* 31.3* 28.8* 31.5* 33.9*   PLATELETS 10*3/mm3 123* 108* 80* 91* 112*       Results from last 7 days   Lab Units 07/16/23  0913   INR  1.46*         Culture Data:   No results found for: BLOODCX    No results found for: URINECX  No results found for: RESPCX  No results found for: WOUNDCX  No results found for: STOOLCX  No components found for: BODYFLD    Radiology Data:   Imaging Results (Last 24 Hours)       Procedure Component Value Units Date/Time    CT Lower Extremity Left With Contrast [[id number]] Collected: 07/19/23 1305     Updated: 07/19/23 1648    Narrative:      INDICATION:  Soft tissue mass, ankle, vascular mass suspected.    COMPARISON:  None relevant.    TECHNIQUE:  Helical CT centered about the left ankle was performed with intravenous  contrast.  Multiplanar reformations were provided.    FINDINGS:  Considerable bony destruction involves the talocrural, subtalar, and midfoot  articulations with essential complete obliteration and fragmentation of the  talus.  Arthrodesis nail and screw transfix the tibia and calcaneus with  additional screw transfixing calcaneocuboid joint, neither area with significant  fusion.  Considerable soft tissue edema and stranding and bony fragments are  seen throughout the soft tissues.  No evidence of hardware loosening.    Prior amputation of the distal aspect of the fibula.  No acute fracture or  dislocation is suspected.      Impression:      Considerable bony destruction of the ankle and midfoot likely consistent with  severe Charcot  arthropathy.    No soft tissue neoplasm is suspected.                I have utilized all available immediate resources to obtain, update, or review the patient's current medications (including all prescriptions, over-the-counter products, herbals, cannabis/cannabidiol products, and vitamin/mineral/dietary (nutritional) supplements).       Assessment/Plan     Active Hospital Problems    Diagnosis     **Septicemia due to Enterobacter     Sepsis     Fever, unknown origin        Plan:    Sepsis, most likely infectious source recurrent chronic osteomyelitis, markedly elevated CRP, other possibility could be septic joint or infected hardware, however hardware appears intact on x-rays. Enterobacter cloacae complex, pending susceptibility, on cefepime.  ---In any event, it would require weeks of IV antibiotics, would lean towards completing 6 weeks of IV antibiotics for more definitive treatment of likely recurrent osteomyelitis  --Recurrent Enterobacter bacteremia without source control  --Repeat blood culture to ensure clearance  - Unable to obtain MRI as unable to verify device compatibility  --Follow fever curve  --Lactic acidosis improved with fluids  --Podiatry do not consider intervention  -- Continue skin care     Chronic HFrEF, EF 30 to 35% on echo in June, AICD present  Cautious with any further fluid resuscitation     A-fib  Monitor telemetry  Rate control on metoprolol twice daily  Anticoagulated on Eliquis    Progressive pancytopenia, bone marrow toxicity because of antibiotics, start folic acid, check b12 levels, discontinue cefepime (>10% hematologic reactions and start iv meropenem, consult hematology.    Type 2 diabetes, hyperglycemia because of sepsis, start basal insulin levemir and follow.     History of DVT/PE  Anticoagulated on Eliquis    Wants a new bed, bigger. Wants in the future home health to IV antibiotics en case he needs them            Medical Decision Making  Number and Complexity of  problems: 5  Differential Diagnosis: none    Conditions and Status:        Condition is unchanged.     [facility] Data  External documents reviewed: none  My EKG interpretation: sinus tach  My plain film interpretation: dual chamber pacemaker  Tests considered but not ordered: none     Decision rules/scores evaluated (example [id number], Wells, etc): 5     Discussed with: patient and nurse staff     Treatment Plan  Dc cefepime  Start meropenem  CT extremity left  Levemir  7/20/2023: Discussed with pharmacy about changing from Merrem as it is 3 times a day and the patient does not believe that he will have the dexterity to do IV antibiotics at home home health teaches him.  He is refusing rehab.  We will discuss LTAC and possible outpatient antibiotics with case management, pharmacy.  Improvement in pancytopenia noted today.      Care Planning  Shared decision making: none  Code status and discussions: full code    Disposition  Social Determinants of Health that impact treatment or disposition: none  I expect the patient to be discharged to home in 7 days.       I confirmed that the patient's Advance Care Plan is present, code status is documented, or surrogate decision maker is listed in the patient's medical record.      This document has been electronically signed by Christi Cuenca PA-C on July 20, 2023 09:35 CDT

## 2023-07-20 NOTE — THERAPY TREATMENT NOTE
Patient Name: Emre Lisa  : 1960    MRN: [id number]                              Today's Date: 2023       Admit Date: 2023    Visit Dx:     ICD-10-CM ICD-9-CM   1. Fever of unknown origin  R50.9 780.60   2. Sepsis due to other etiology  A41.89 038.8   3. Impaired functional mobility, balance, gait, and endurance [Z74.09 (ICD-10-CM)]  Z74.09 V49.89   4. Impaired mobility and activities of daily living [Z74.09, Z78.9 (ICD-10-CM)]  Z74.09 V49.89    Z78.9      Patient Active Problem List   Diagnosis    Foot osteomyelitis, right    Nodule of groin    Type 2 diabetes mellitus with skin complication    Status post transmetatarsal amputation of right foot    Hammer toe of left foot    Hallux malleus of left foot    Cellulitis of left foot    Infected hardware in left leg    Chronic multifocal osteomyelitis of left foot    Charcot's joint of left foot    Skin ulcer of left foot, limited to breakdown of skin    Dyspnea    Syncope    Acute respiratory failure with hypoxia    Tachycardia    Neuropathy due to secondary diabetes mellitus    Atrial fibrillation    Presence of biventricular cardiac pacemaker    Hypertension    Medically complex patient    Fever of unknown origin    Bacteremia    Primary osteoarthritis of knees, bilateral    Sepsis    Fever, unknown origin    Septicemia due to Enterobacter     Past Medical History:   Diagnosis Date    Arthritis     Atrial fibrillation     Diabetes mellitus     Diabetic foot ulcer associated with type 2 diabetes mellitus     left great toe    Hallux malleus of left foot     Hammer toe     Hypertension     MI (myocardial infarction)     Neuropathy in diabetes     Onychomycosis     Presence of biventricular cardiac pacemaker     Rheumatoid arthritis      Past Surgical History:   Procedure Laterality Date    AMPUTATION DIGIT Right 2017    Procedure: RIGHT AMPUTATION TRANSMETATRASAL , RECESSION GASTROCNEMOUS;  Surgeon: Marco A Thompson DPM;  Location:   [facility] OR;  Service:     ANKLE FUSION Left 04/13/2023    Procedure: REDUCTION OF LEFT ANKLE DEFORMITY WITH APPLICATION OF EXTERNAL FIXATOR;  Surgeon: Marco A Thompson DPM;  Location: [facility] OR;  Service: Podiatry;  Laterality: Left;    CARDIAC CATHETERIZATION      CARDIAC DEFIBRILLATOR PLACEMENT  2010, 2016    CARPAL TUNNEL RELEASE  2015    elbow and wrist left arm    FOOT FUSION Left 05/15/2023    Procedure: REMOVAL OF EXTERNAL FIXATOR LEFT LOWER EXTREMITY WITH TIBIAL TALOCALCANEAL ARTHRODESIS  AND ALL INDICATED PROCEDURES;  Surgeon: Marco A Thompson DPM;  Location: [facility] OR;  Service: Podiatry;  Laterality: Left;    FOOT IRRIGATION, DEBRIDEMENT AND REPAIR Left 03/07/2018    Procedure: FOOT IRRIGATION, DEBRIDEMENT AND REPAIR;  Surgeon: Marco A Thompson DPM;  Location: [facility] OR;  Service:     FOOT IRRIGATION, DEBRIDEMENT AND REPAIR Left 03/10/2018    Procedure: FOOT IRRIGATION, DEBRIDEMENT AND REPAIR;  Surgeon: Marco A Thompson DPM;  Location: [facility] OR;  Service: Podiatry    FOOT WOUND CLOSURE Right 03/02/2017    Procedure: INCISION AND DRAINAGE, RIGHT FOOT;  Surgeon: Tung Rosenthal DPM;  Location: [facility] OR;  Service:     HAMMER TOE REPAIR Left 02/15/2018    Procedure: HAMMERTOE CORRECTION LEFT SECOND, THIRD AND FOURTH TOES AND HALLUX INTERPHALANGEAL JOINT ARTHRODESIS LEFT FOOT (Micro Asnis, 4.0 & 5.0 Asnis)      (c-arm);  Surgeon: Marco A Thompson DPM;  Location: [facility] OR;  Service:     HARDWARE REMOVAL Left 03/05/2018    Procedure: ANKLE/FOOT HARDWARE REMOVAL;  Surgeon: Marco A Thompson DPM;  Location: [facility] OR;  Service:     INCISION AND DRAINAGE LEG Left 03/05/2018    Procedure: INCISION AND DRAINAGE LOWER EXTREMITY;  Surgeon: Marco A Thompson DPM;  Location: [facility] OR;  Service:     PLANTAR FASCIA RELEASE Left 11/21/2019    Procedure: PLANTAR PLANING LEFT FOOT AND ALL OTHER INDICATED PROCEDURES;  Surgeon: Marco A Thompson DPM;  Location: [facility] OR;  Service: Podiatry    TOE AMPUTATION Right 2016     TONSILECTOMY, ADENOIDECTOMY, BILATERAL MYRINGOTOMY AND TUBES      age 25      General Information       Row Name 07/20/23 1049          Physical Therapy Time and Intention    Document Type therapy note (daily note)  -MM     Mode of Treatment physical therapy  -MM       Row Name 07/20/23 1049          General Information    Patient Profile Reviewed yes  -MM     Existing Precautions/Restrictions fall  -MM       Row Name 07/20/23 1049          Cognition    Orientation Status (Cognition) oriented x 4  -MM       Row Name 07/20/23 1049          Safety Issues, Functional Mobility    Impairments Affecting Function (Mobility) balance;endurance/activity tolerance;range of motion (ROM);sensation/sensory awareness;strength  -MM               User Key  (r) = Recorded By, (t) = Taken By, (c) = Cosigned By      Initials Name Provider Type    MM Joelle Doan, PT Physical Therapist                   Mobility       Row Name 07/20/23 1049          Bed Mobility    Bed Mobility supine-sit  -MM     Supine-Sit Ogle (Bed Mobility) moderate assist (50% patient effort);1 person assist  -MM     Assistive Device (Bed Mobility) bed rails;head of bed elevated  -MM       Row Name 07/20/23 1049          Sit-Stand Transfer    Sit-Stand Ogle (Transfers) supervision  -MM     Assistive Device (Sit-Stand Transfers) walker, front-wheeled  -MM       Row Name 07/20/23 1049          Gait/Stairs (Locomotion)    Ogle Level (Gait) supervision  -MM     Assistive Device (Gait) walker, front-wheeled  -MM     Distance in Feet (Gait) 400' X 1  -MM     Deviations/Abnormal Patterns (Gait) gait speed decreased;base of support, wide  -MM     Bilateral Gait Deviations forward flexed posture  -MM     Comment, (Gait/Stairs) Patient able to ambulate 400' X 1 with seated rest breaks at 70' and 230'. Patient ambulates with decreased gait speed, increased base of support and increased IR on L LE.  -MM               User Key  (r) = Recorded By,  (t) = Taken By, (c) = Cosigned By      Initials Name Provider Type    Joelle Soler PT Physical Therapist                   Obj/Interventions       Row Name 07/20/23 1049          Range of Motion Comprehensive    General Range of Motion lower extremity range of motion deficits identified  -MM     Comment, General Range of Motion Decreased ankle AROM on BLE.  -MM               User Key  (r) = Recorded By, (t) = Taken By, (c) = Cosigned By      Initials Name Provider Type    Joelle Soler PT Physical Therapist                   Goals/Plan       Row Name 07/20/23 1049          Bed Mobility Goal 1 (PT)    Activity/Assistive Device (Bed Mobility Goal 1, PT) sit to supine/supine to sit  -MM     [place] Level/Cues Needed (Bed Mobility Goal 1, PT) independent  -MM     Time Frame (Bed Mobility Goal 1, PT) 30 days  -MM     Progress/Outcomes (Bed Mobility Goal 1, PT) goal not met  -MM       Row Name 07/20/23 1049          Transfer Goal 1 (PT)    Activity/Assistive Device (Transfer Goal 1, PT) bed-to-chair/chair-to-bed;sit-to-stand/stand-to-sit;walker, rolling  -MM     [place] Level/Cues Needed (Transfer Goal 1, PT) modified independence  -MM     Time Frame (Transfer Goal 1, PT) by discharge  -MM     Progress/Outcome (Transfer Goal 1, PT) goal not met  -MM       Row Name 07/20/23 1049          Gait Training Goal 1 (PT)    Activity/Assistive Device (Gait Training Goal 1, PT) walker, rolling  -MM     [place] Level (Gait Training Goal 1, PT) modified independence  -MM     Distance (Gait Training Goal 1, PT) 150' X 2  -MM     Time Frame (Gait Training Goal 1, PT) by discharge  -MM     Progress/Outcome (Gait Training Goal 1, PT) goal revised this date  -MM       Row Name 07/20/23 1049          Stairs Goal 1 (PT)    Activity/Assistive Device (Stairs Goal 1, PT) ascending stairs;descending stairs  -MM     [place] Level/Cues Needed (Stairs Goal 1, PT) independent  -MM     Number of Stairs  (Stairs Goal 1, PT) 4  -MM     Time Frame (Stairs Goal 1, PT) by discharge  -MM     Progress/Outcome (Stairs Goal 1, PT) goal not met  -MM               User Key  (r) = Recorded By, (t) = Taken By, (c) = Cosigned By      Initials Name Provider Type    Joelle Soler, PT Physical Therapist                   Clinical Impression       Row Name 07/20/23 1049          Pain    Pretreatment Pain Rating 4/10  -MM     Posttreatment Pain Rating 4/10  -MM     Pain Location - Side/Orientation Right  -MM     Pain Location - shoulder  -MM       Row Name 07/20/23 1049          Plan of Care Review    Plan of Care Reviewed With patient  -MM     Outcome Evaluation Patient AO X 4 and agreeable to therapy treatment. Patient supine>sit Mod A X 1. Patient sit>stand supervision. Patient was able to ambulate 400' X 1 with supervision and 2 rest breaks at 70' and 230' and WC follow. Goals updated. Patient would benefit from continued skilled IP PT.  -MM       Row Name 07/20/23 1049          Therapy Assessment/Plan (PT)    Rehab Potential (PT) good, to achieve stated therapy goals  -MM     Criteria for Skilled Interventions Met (PT) yes;skilled treatment is necessary  -MM     Therapy Frequency (PT) other (see comments)  5-7 days/week  -MM       Row Name 07/20/23 1049          Vital Signs    Pre Systolic BP Rehab 143  -MM     Pre Treatment Diastolic BP 98  -MM     Pretreatment Heart Rate (beats/min) 98  -MM     Pre SpO2 (%) 98  -MM     O2 Delivery Pre Treatment room air  -MM     Pre Patient Position Supine  -MM       Row Name 07/20/23 1049          Positioning and Restraints    Post Treatment Position bed  -MM     In Bed sitting EOB;call light within reach;encouraged to call for assist  -MM               User Key  (r) = Recorded By, (t) = Taken By, (c) = Cosigned By      Initials Name Provider Type    Joelle Soler, PT Physical Therapist                   Outcome Measures       Row Name 07/20/23 1049 07/20/23 0949       How  much help from another person do you currently need...    Turning from your back to your side while in flat bed without using bedrails? 4  -MM 3  -MC    Moving from lying on back to sitting on the side of a flat bed without bedrails? 2  -MM 3  -MC    Moving to and from a bed to a chair (including a wheelchair)? 3  -MM 3  -MC    Standing up from a chair using your arms (e.g., wheelchair, bedside chair)? 3  -MM 3  -MC    Climbing 3-5 steps with a railing? 2  -MM 3  -MC    To walk in hospital room? 3  -MM 3  -MC    AM-PAC 6 Clicks Score (PT) 17  -MM 18  -MC    Highest level of mobility 5 --> Static standing  -MM 6 --> Walked 10 steps or more  -      Row Name 07/20/23 1049          Functional Assessment    Outcome Measure Options AM-PAC 6 Clicks Basic Mobility (PT)  -MM               User Key  (r) = Recorded By, (t) = Taken By, (c) = Cosigned By      Initials Name Provider Type    Toya Rodríguez, RN Registered Nurse    Joelle Soler, NESTOR Physical Therapist                                 Physical Therapy Education       Title: PT OT SLP Therapies (In Progress)       Topic: Physical Therapy (In Progress)       Point: Mobility training (In Progress)       Learning Progress Summary             Patient Acceptance, E, NR by MM at 7/20/2023 1217    Comment: Hand placement to facilitate transfers, use of FWW for safety, adjusting FWW to correct height to prevent forward trunk flexion.    Acceptance, E,TB, VU by CB at 7/20/2023 0451    Acceptance, E, NR by MM at 7/17/2023 1150    Comment: PT POC and goals. Proper hand positioning for transfers, proper use of FWW for safety.                         Point: Home exercise program (Done)       Learning Progress Summary             Patient Acceptance, E,TB, VU by CB at 7/20/2023 0451                         Point: Body mechanics (Done)       Learning Progress Summary             Patient Acceptance, E,TB, VU by CB at 7/20/2023 0451                         Point:  Precautions (Done)       Learning Progress Summary             Patient Acceptance, E,TB, VU by CB at 7/20/2023 0451                                         User Key       Initials Effective Dates Name Provider Type Discipline    RAFFI 02/14/22 -  Nida Irwin, RN Registered Nurse Nurse    MM 06/26/23 -  Joelle Doan, PT Physical Therapist PT                  PT Recommendation and Plan  Planned Therapy Interventions (PT): balance training, bed mobility training, gait training, home exercise program, strengthening, manual therapy techniques, neuromuscular re-education, patient/family education, stretching, stair training, ROM (range of motion), transfer training  Plan of Care Reviewed With: patient  Outcome Evaluation: Patient AO X 4 and agreeable to therapy treatment. Patient supine>sit Mod A X 1. Patient sit>stand supervision. Patient was able to ambulate 400' X 1 with supervision and 2 rest breaks at 70' and 230' and WC follow. Goals updated. Patient would benefit from continued skilled IP PT.     Time Calculation:   PT Evaluation Complexity  History, PT Evaluation Complexity: 3 or more personal factors and/or comorbidities  Examination of Body Systems (PT Eval Complexity): total of 4 or more elements  Clinical Presentation (PT Evaluation Complexity): evolving  Clinical Decision Making (PT Evaluation Complexity): moderate complexity  Overall Complexity (PT Evaluation Complexity): moderate complexity     PT Charges       Row Name 07/20/23 1218             Time Calculation    Start Time 1049  -MM      Stop Time 1140  -MM      Time Calculation (min) 51 min  -MM      PT Received On 07/20/23  -MM         Time Calculation- PT    Total Timed Code Minutes- PT 51 minute(s)  -MM         Timed Charges    57002 - Gait Training Minutes  30  -MM      16691 - PT Therapeutic Activity Minutes 21  -MM         Total Minutes    Timed Charges Total Minutes 51  -MM       Total Minutes 51  -MM                User Key  (r) =  Recorded By, (t) = Taken By, (c) = Cosigned By      Initials Name Provider Type    MM Joelle Doan, PT Physical Therapist                  Therapy Charges for Today       Code Description Service Date Service Provider Modifiers Qty    [id number] HC GAIT TRAINING EA 15 MIN 7/20/2023 Joelle Doan, PT GP 2    [id number] HC PT THERAPEUTIC ACT EA 15 MIN 7/20/2023 Joelle Doan, PT GP 1            PT G-Codes  Outcome Measure Options: AM-PAC 6 Clicks Basic Mobility (PT)  AM-PAC 6 Clicks Score (PT): 17  AM-PAC 6 Clicks Score (OT): 17  PT Discharge Summary  Anticipated Discharge Disposition (PT): home with home health, home with assist    Joelle Doan, PT  7/20/2023

## 2023-07-20 NOTE — THERAPY TREATMENT NOTE
Patient Name: Emre Lisa  : 1960    MRN: [id number]                              Today's Date: 2023       Admit Date: 2023    Visit Dx:     ICD-10-CM ICD-9-CM   1. Fever of unknown origin  R50.9 780.60   2. Sepsis due to other etiology  A41.89 038.8   3. Impaired functional mobility, balance, gait, and endurance [Z74.09 (ICD-10-CM)]  Z74.09 V49.89   4. Impaired mobility and activities of daily living [Z74.09, Z78.9 (ICD-10-CM)]  Z74.09 V49.89    Z78.9      Patient Active Problem List   Diagnosis    Foot osteomyelitis, right    Nodule of groin    Type 2 diabetes mellitus with skin complication    Status post transmetatarsal amputation of right foot    Hammer toe of left foot    Hallux malleus of left foot    Cellulitis of left foot    Infected hardware in left leg    Chronic multifocal osteomyelitis of left foot    Charcot's joint of left foot    Skin ulcer of left foot, limited to breakdown of skin    Dyspnea    Syncope    Acute respiratory failure with hypoxia    Tachycardia    Neuropathy due to secondary diabetes mellitus    Atrial fibrillation    Presence of biventricular cardiac pacemaker    Hypertension    Medically complex patient    Fever of unknown origin    Bacteremia    Primary osteoarthritis of knees, bilateral    Sepsis    Fever, unknown origin    Septicemia due to Enterobacter     Past Medical History:   Diagnosis Date    Arthritis     Atrial fibrillation     Diabetes mellitus     Diabetic foot ulcer associated with type 2 diabetes mellitus     left great toe    Hallux malleus of left foot     Hammer toe     Hypertension     MI (myocardial infarction)     Neuropathy in diabetes     Onychomycosis     Presence of biventricular cardiac pacemaker     Rheumatoid arthritis      Past Surgical History:   Procedure Laterality Date    AMPUTATION DIGIT Right 2017    Procedure: RIGHT AMPUTATION TRANSMETATRASAL , RECESSION GASTROCNEMOUS;  Surgeon: Marco A Thompson DPM;  Location:   [facility] OR;  Service:     ANKLE FUSION Left 04/13/2023    Procedure: REDUCTION OF LEFT ANKLE DEFORMITY WITH APPLICATION OF EXTERNAL FIXATOR;  Surgeon: Marco A Thompson DPM;  Location: [facility] OR;  Service: Podiatry;  Laterality: Left;    CARDIAC CATHETERIZATION      CARDIAC DEFIBRILLATOR PLACEMENT  2010, 2016    CARPAL TUNNEL RELEASE  2015    elbow and wrist left arm    FOOT FUSION Left 05/15/2023    Procedure: REMOVAL OF EXTERNAL FIXATOR LEFT LOWER EXTREMITY WITH TIBIAL TALOCALCANEAL ARTHRODESIS  AND ALL INDICATED PROCEDURES;  Surgeon: Marco A Thompson DPM;  Location: [facility] OR;  Service: Podiatry;  Laterality: Left;    FOOT IRRIGATION, DEBRIDEMENT AND REPAIR Left 03/07/2018    Procedure: FOOT IRRIGATION, DEBRIDEMENT AND REPAIR;  Surgeon: Marco A Thompson DPM;  Location: [facility] OR;  Service:     FOOT IRRIGATION, DEBRIDEMENT AND REPAIR Left 03/10/2018    Procedure: FOOT IRRIGATION, DEBRIDEMENT AND REPAIR;  Surgeon: Marco A Thompson DPM;  Location: [facility] OR;  Service: Podiatry    FOOT WOUND CLOSURE Right 03/02/2017    Procedure: INCISION AND DRAINAGE, RIGHT FOOT;  Surgeon: Tung Rosenthal DPM;  Location: [facility] OR;  Service:     HAMMER TOE REPAIR Left 02/15/2018    Procedure: HAMMERTOE CORRECTION LEFT SECOND, THIRD AND FOURTH TOES AND HALLUX INTERPHALANGEAL JOINT ARTHRODESIS LEFT FOOT (Micro Asnis, 4.0 & 5.0 Asnis)      (c-arm);  Surgeon: Marco A Thompson DPM;  Location: [facility] OR;  Service:     HARDWARE REMOVAL Left 03/05/2018    Procedure: ANKLE/FOOT HARDWARE REMOVAL;  Surgeon: Marco A Thompson DPM;  Location: [facility] OR;  Service:     INCISION AND DRAINAGE LEG Left 03/05/2018    Procedure: INCISION AND DRAINAGE LOWER EXTREMITY;  Surgeon: Marco A Thompson DPM;  Location: [facility] OR;  Service:     PLANTAR FASCIA RELEASE Left 11/21/2019    Procedure: PLANTAR PLANING LEFT FOOT AND ALL OTHER INDICATED PROCEDURES;  Surgeon: Marco A Thompson DPM;  Location: [facility] OR;  Service: Podiatry    TOE AMPUTATION Right 2016     TONSILECTOMY, ADENOIDECTOMY, BILATERAL MYRINGOTOMY AND TUBES      age 25      General Information       Row Name 07/20/23 1441          OT Time and Intention    Document Type therapy note (daily note)  -     Mode of Treatment individual therapy;occupational therapy  -       Row Name 07/20/23 1441          General Information    Patient Profile Reviewed yes  -     Existing Precautions/Restrictions fall  -       Row Name 07/20/23 1441          Cognition    Orientation Status (Cognition) oriented x 4  -       Row Name 07/20/23 1441          Safety Issues, Functional Mobility    Impairments Affecting Function (Mobility) balance;endurance/activity tolerance;range of motion (ROM);sensation/sensory awareness;strength  -               User Key  (r) = Recorded By, (t) = Taken By, (c) = Cosigned By      Initials Name Provider Type     Oanh Pacheco COTA Occupational Therapist Assistant                     Mobility/ADL's       Row Name 07/20/23 1441          Bed Mobility    Bed Mobility supine-sit;sit-supine  -     Supine-Sit [place] (Bed Mobility) moderate assist (50% patient effort)  -     Sit-Supine [place] (Bed Mobility) standby assist  -     Assistive Device (Bed Mobility) bed rails;head of bed elevated  -       Row Name 07/20/23 1441          Transfers    Transfers sit-stand transfer;stand-sit transfer;toilet transfer  -[facility] Name 07/20/23 1441          Sit-Stand Transfer    Sit-Stand [place] (Transfers) supervision  -     Assistive Device (Sit-Stand Transfers) walker, front-wheeled  -[facility] Name 07/20/23 1441          Stand-Sit Transfer    Stand-Sit [place] (Transfers) supervision  -     Assistive Device (Stand-Sit Transfers) walker, front-wheeled  -[facility] Name 07/20/23 1441          Toilet Transfer    [place] Level (Toilet Transfer) supervision  -     Assistive Device (Toilet Transfer) commode;grab bars/safety frame;walker, front-wheeled  -        Row Name 07/20/23 1441          Activities of Daily Living    BADL Assessment/Intervention grooming;toileting  -LW       Row Name 07/20/23 1441          Grooming Assessment/Training    [place] Level (Grooming) hair care, combing/brushing;oral care regimen;wash face, hands;modified independence  -LW     Position (Grooming) edge of bed sitting  -LW       Row Name 07/20/23 1441          Toileting Assessment/Training    [place] Level (Toileting) adjust/manage clothing;perform perineal hygiene;modified independence  -LW     Assistive Devices (Toileting) commode;grab bar/safety frame  -LW     Position (Toileting) unsupported sitting  -LW               User Key  (r) = Recorded By, (t) = Taken By, (c) = Cosigned By      Initials Name Provider Type    Oanh Trevino COTA Occupational Therapist Assistant                   Obj/Interventions    No documentation.                  Goals/Plan       [facility] Name 07/20/23 0835          Transfer Goal 1 (OT)    Activity/Assistive Device (Transfer Goal 1, OT) transfers, all  -LW     [place] Level/Cues Needed (Transfer Goal 1, OT) modified independence  -LW     Time Frame (Transfer Goal 1, OT) long term goal (LTG)  -LW     Progress/Outcome (Transfer Goal 1, OT) goal not met  -[facility] Name 07/20/23 0835          Bathing Goal 1 (OT)    Activity/Device (Bathing Goal 1, OT) bathing skills, all  -LW     [place] Level/Cues Needed (Bathing Goal 1, OT) supervision required  -LW     Time Frame (Bathing Goal 1, OT) long term goal (LTG)  -LW     Progress/Outcomes (Bathing Goal 1, OT) goal not met  -[facility] Name 07/20/23 0835          Dressing Goal 1 (OT)    Activity/Device (Dressing Goal 1, OT) dressing skills, all  -LW     [place]/Cues Needed (Dressing Goal 1, OT) supervision required  -LW     Time Frame (Dressing Goal 1, OT) long term goal (LTG)  -LW     Progress/Outcome (Dressing Goal 1, OT) goal not met  -LW       Row Name 07/20/23 0835           Toileting Goal 1 (OT)    Activity/Device (Toileting Goal 1, OT) toileting skills, all  -LW     [place] Level/Cues Needed (Toileting Goal 1, OT) modified independence  -LW     Time Frame (Toileting Goal 1, OT) long term goal (LTG)  -LW     Progress/Outcome (Toileting Goal 1, OT) goal not met  -LW               User Key  (r) = Recorded By, (t) = Taken By, (c) = Cosigned By      Initials Name Provider Type    Oanh Trevino COTA Occupational Therapist Assistant                   Clinical Impression       Row Name 07/20/23 1444          Pain Assessment    Pretreatment Pain Rating 4/10  -LW     Posttreatment Pain Rating 4/10  -LW     Pain Location - Side/Orientation Right  -LW     Pain Location - shoulder  -LW     Pain Intervention(s) Nursing Notified  -LW       Row Name 07/20/23 1444          Plan of Care Review    Plan of Care Reviewed With patient  -LW     Progress improving  -LW     Outcome Evaluation Pt supine in bed . Supine to sit Mod A. Sit to supine SBA. Sit to stand, t/f to bathroom SBA. Toileting Mod I. Toilet tasks Mod I. Grooming Mod I. Patient sitting fowlers postition in bed. All needs in reach. Pt would benefit from continued OT services.  -LW       Row Name 07/20/23 1444          Positioning and Restraints    Pre-Treatment Position in bed  -LW     Post Treatment Position bed  -LW     In Bed fowlers;call light within reach;encouraged to call for assist;exit alarm on  -LW               User Key  (r) = Recorded By, (t) = Taken By, (c) = Cosigned By      Initials Name Provider Type    Oanh Trevino COTA Occupational Therapist Assistant                   Outcome Measures       Row Name 07/20/23 1447          How much help from another is currently needed...    Putting on and taking off regular lower body clothing? 2  -LW     Bathing (including washing, rinsing, and drying) 2  -LW     Toileting (which includes using toilet bed pan or urinal) 2  -LW     Putting on and taking off regular upper  body clothing 3  -LW     Taking care of personal grooming (such as brushing teeth) 4  -LW     Eating meals 4  -LW     AM-PAC 6 Clicks Score (OT) 17  -LW       Row Name 07/20/23 1049 07/20/23 0949       How much help from another person do you currently need...    Turning from your back to your side while in flat bed without using bedrails? 4  -MM 3  -MC    Moving from lying on back to sitting on the side of a flat bed without bedrails? 2  -MM 3  -MC    Moving to and from a bed to a chair (including a wheelchair)? 3  -MM 3  -MC    Standing up from a chair using your arms (e.g., wheelchair, bedside chair)? 3  -MM 3  -MC    Climbing 3-5 steps with a railing? 2  -MM 3  -MC    To walk in hospital room? 3  -MM 3  -MC    AM-PAC 6 Clicks Score (PT) 17  -MM 18  -MC    Highest level of mobility 5 --> Static standing  -MM 6 --> Walked 10 steps or more  -      Row Name 07/20/23 1049          Functional Assessment    Outcome Measure Options AM-PAC 6 Clicks Basic Mobility (PT)  -MM               User Key  (r) = Recorded By, (t) = Taken By, (c) = Cosigned By      Initials Name Provider Type    Toya Rodríguez, RN Registered Nurse    Oanh Trevino COTA Occupational Therapist Assistant    MM Joelle Doan, PT Physical Therapist                    Occupational Therapy Education       Title: PT OT SLP Therapies (In Progress)       Topic: Occupational Therapy (Done)       Point: ADL training (Done)       Description:   Instruct learner(s) on proper safety adaptation and remediation techniques during self care or transfers.   Instruct in proper use of assistive devices.                  Learning Progress Summary             Patient Acceptance, E,TB, VU by DONNY at 7/20/2023 1448    Acceptance, E,TB, VU by RAFFI at 7/20/2023 0451    Acceptance, E, VU by JES at 7/19/2023 1405    Comment: Home safety/fall prevention and equipment needs discussed with pt.    Acceptance, E,TB, VU by JES at 7/19/2023 1337    Acceptance, E,TB, VU  by BB at 7/18/2023 1322                         Point: Home exercise program (Done)       Description:   Instruct learner(s) on appropriate technique for monitoring, assisting and/or progressing therapeutic exercises/activities.                  Learning Progress Summary             Patient Acceptance, E,TB, VU by LW at 7/20/2023 1448    Acceptance, E,TB, VU by CB at 7/20/2023 0451                         Point: Precautions (Done)       Description:   Instruct learner(s) on prescribed precautions during self-care and functional transfers.                  Learning Progress Summary             Patient Acceptance, E,TB, VU by LW at 7/20/2023 1448    Acceptance, E,TB, VU by CB at 7/20/2023 0451    Acceptance, E, VU by RB at 7/17/2023 1317    Comment: Edu pt on use of gait belt and non skid socks when OOB and no OOB without assist.                         Point: Body mechanics (Done)       Description:   Instruct learner(s) on proper positioning and spine alignment during self-care, functional mobility activities and/or exercises.                  Learning Progress Summary             Patient Acceptance, E,TB, VU by LW at 7/20/2023 1448    Acceptance, E,TB, VU by CB at 7/20/2023 0451    Acceptance, E, VU by BB at 7/19/2023 1405    Comment: Home safety/fall prevention and equipment needs discussed with pt.    Acceptance, E,TB, VU by BB at 7/19/2023 1337    Acceptance, E,TB, VU by BB at 7/18/2023 1322                                         User Key       Initials Effective Dates Name Provider Type Discipline    RB 07/11/23 -  Sincere Dyer OT Occupational Therapist OT    BB 06/16/21 -  Paola Fortune COTA Occupational Therapist Assistant OT    LW 06/16/21 -  Oanh Pacheco COTA Occupational Therapist Assistant OT    CB 02/14/22 -  Nida Irwin RN Registered Nurse Nurse                  OT Recommendation and Plan     Plan of Care Review  Plan of Care Reviewed With: patient  Progress: improving  Outcome  Evaluation: Pt supine in bed . Supine to sit Mod A. Sit to supine SBA. Sit to stand, t/f to bathroom SBA. Toileting Mod I. Toilet tasks Mod I. Grooming Mod I. Patient sitting fowlers postition in bed. All needs in reach. Pt would benefit from continued OT services.     Time Calculation:         Time Calculation- OT       Row Name 07/20/23 1449 07/20/23 1218          Time Calculation- OT    OT Start Time 0835  -LW --     OT Stop Time 0915  -LW --     OT Time Calculation (min) 40 min  -LW --     Total Timed Code Minutes- OT 40 minute(s)  -LW --     OT Received On 07/20/23  -LW --        Timed Charges    92347 - Gait Training Minutes  -- 30  -MM     70002 - OT Self Care/Mgmt Minutes 40  -LW --        Total Minutes    Timed Charges Total Minutes 40  -LW 30  -MM      Total Minutes 40  -LW 30  -MM               User Key  (r) = Recorded By, (t) = Taken By, (c) = Cosigned By      Initials Name Provider Type    LW Oanh Pacheco COTA Occupational Therapist Assistant    MM Joelle Doan, PT Physical Therapist                  Therapy Charges for Today       Code Description Service Date Service Provider Modifiers Qty    32048199672 HC OT SELF CARE/MGMT/TRAIN EA 15 MIN 7/20/2023 Oanh Pacheco COTA GO 3                 ZIGGY Kelly  7/20/2023

## 2023-07-20 NOTE — PLAN OF CARE
Goal Outcome Evaluation:  Plan of Care Reviewed With: patient           Outcome Evaluation: Patient AO X 4 and agreeable to therapy treatment. Patient supine>sit Mod A X 1. Patient sit>stand supervision. Patient was able to ambulate 400' X 1 with supervision and 2 rest breaks at 70' and 230' and WC follow. Goals updated. Patient would benefit from continued skilled IP PT.      Anticipated Discharge Disposition (PT): home with home health, home with assist

## 2023-07-21 ENCOUNTER — APPOINTMENT (OUTPATIENT)
Dept: INTERVENTIONAL RADIOLOGY/VASCULAR | Facility: HOSPITAL | Age: 63
DRG: 872 | End: 2023-07-21
Payer: MEDICARE

## 2023-07-21 ENCOUNTER — APPOINTMENT (OUTPATIENT)
Dept: ULTRASOUND IMAGING | Facility: HOSPITAL | Age: 63
DRG: 872 | End: 2023-07-21
Payer: MEDICARE

## 2023-07-21 LAB
ANION GAP SERPL CALCULATED.3IONS-SCNC: 11 MMOL/L (ref 5–15)
BASOPHILS # BLD AUTO: 0.02 10*3/MM3 (ref 0–0.2)
BASOPHILS NFR BLD AUTO: 0.5 % (ref 0–1.5)
BUN SERPL-MCNC: 12 MG/DL (ref 8–23)
BUN/CREAT SERPL: 15.8 (ref 7–25)
CALCIUM SPEC-SCNC: 9.1 MG/DL (ref 8.6–10.5)
CHLORIDE SERPL-SCNC: 99 MMOL/L (ref 98–107)
CO2 SERPL-SCNC: 26 MMOL/L (ref 22–29)
CREAT SERPL-MCNC: 0.76 MG/DL (ref 0.76–1.27)
DEPRECATED RDW RBC AUTO: 49.5 FL (ref 37–54)
EGFRCR SERPLBLD CKD-EPI 2021: 102.3 ML/MIN/1.73
EOSINOPHIL # BLD AUTO: 0.14 10*3/MM3 (ref 0–0.4)
EOSINOPHIL NFR BLD AUTO: 3.5 % (ref 0.3–6.2)
ERYTHROCYTE [DISTWIDTH] IN BLOOD BY AUTOMATED COUNT: 16.2 % (ref 12.3–15.4)
GLUCOSE BLDC GLUCOMTR-MCNC: 146 MG/DL (ref 70–130)
GLUCOSE BLDC GLUCOMTR-MCNC: 184 MG/DL (ref 70–130)
GLUCOSE BLDC GLUCOMTR-MCNC: 220 MG/DL (ref 70–130)
GLUCOSE SERPL-MCNC: 142 MG/DL (ref 65–99)
HCT VFR BLD AUTO: 36.5 % (ref 37.5–51)
HGB BLD-MCNC: 11.9 G/DL (ref 13–17.7)
IMM GRANULOCYTES # BLD AUTO: 0.03 10*3/MM3 (ref 0–0.05)
IMM GRANULOCYTES NFR BLD AUTO: 0.7 % (ref 0–0.5)
LYMPHOCYTES # BLD AUTO: 1.45 10*3/MM3 (ref 0.7–3.1)
LYMPHOCYTES NFR BLD AUTO: 35.9 % (ref 19.6–45.3)
MCH RBC QN AUTO: 27.3 PG (ref 26.6–33)
MCHC RBC AUTO-ENTMCNC: 32.6 G/DL (ref 31.5–35.7)
MCV RBC AUTO: 83.7 FL (ref 79–97)
MONOCYTES # BLD AUTO: 0.41 10*3/MM3 (ref 0.1–0.9)
MONOCYTES NFR BLD AUTO: 10.1 % (ref 5–12)
NEUTROPHILS NFR BLD AUTO: 1.99 10*3/MM3 (ref 1.7–7)
NEUTROPHILS NFR BLD AUTO: 49.3 % (ref 42.7–76)
NRBC BLD AUTO-RTO: 0 /100 WBC (ref 0–0.2)
PLATELET # BLD AUTO: 160 10*3/MM3 (ref 140–450)
PMV BLD AUTO: 10.1 FL (ref 6–12)
POTASSIUM SERPL-SCNC: 3.9 MMOL/L (ref 3.5–5.2)
QT INTERVAL: 420 MS
QTC INTERVAL: 472 MS
RBC # BLD AUTO: 4.36 10*6/MM3 (ref 4.14–5.8)
SODIUM SERPL-SCNC: 136 MMOL/L (ref 136–145)
WBC NRBC COR # BLD: 4.04 10*3/MM3 (ref 3.4–10.8)

## 2023-07-21 PROCEDURE — 97530 THERAPEUTIC ACTIVITIES: CPT

## 2023-07-21 PROCEDURE — 76937 US GUIDE VASCULAR ACCESS: CPT

## 2023-07-21 PROCEDURE — C1751 CATH, INF, PER/CENT/MIDLINE: HCPCS

## 2023-07-21 PROCEDURE — 82948 REAGENT STRIP/BLOOD GLUCOSE: CPT

## 2023-07-21 PROCEDURE — 80048 BASIC METABOLIC PNL TOTAL CA: CPT | Performed by: PHYSICIAN ASSISTANT

## 2023-07-21 PROCEDURE — 25010000002 MEROPENEM PER 100 MG

## 2023-07-21 PROCEDURE — 97110 THERAPEUTIC EXERCISES: CPT

## 2023-07-21 PROCEDURE — 99231 SBSQ HOSP IP/OBS SF/LOW 25: CPT | Performed by: PODIATRIST

## 2023-07-21 PROCEDURE — 05HB33Z INSERTION OF INFUSION DEVICE INTO RIGHT BASILIC VEIN, PERCUTANEOUS APPROACH: ICD-10-PCS | Performed by: HOSPITALIST

## 2023-07-21 PROCEDURE — 99232 SBSQ HOSP IP/OBS MODERATE 35: CPT | Performed by: INTERNAL MEDICINE

## 2023-07-21 PROCEDURE — 85025 COMPLETE CBC W/AUTO DIFF WBC: CPT | Performed by: PHYSICIAN ASSISTANT

## 2023-07-21 PROCEDURE — 63710000001 INSULIN DETEMIR PER 5 UNITS

## 2023-07-21 PROCEDURE — 36410 VNPNXR 3YR/> PHY/QHP DX/THER: CPT

## 2023-07-21 RX ADMIN — MEROPENEM 1000 MG: 1 INJECTION, POWDER, FOR SOLUTION INTRAVENOUS at 06:32

## 2023-07-21 RX ADMIN — CETIRIZINE HYDROCHLORIDE 10 MG: 10 TABLET, FILM COATED ORAL at 20:19

## 2023-07-21 RX ADMIN — APIXABAN 5 MG: 5 TABLET, FILM COATED ORAL at 20:19

## 2023-07-21 RX ADMIN — MEROPENEM 1000 MG: 1 INJECTION, POWDER, FOR SOLUTION INTRAVENOUS at 21:40

## 2023-07-21 RX ADMIN — APIXABAN 5 MG: 5 TABLET, FILM COATED ORAL at 08:38

## 2023-07-21 RX ADMIN — Medication 10 ML: at 20:19

## 2023-07-21 RX ADMIN — DOCUSATE SODIUM 50 MG AND SENNOSIDES 8.6 MG 2 TABLET: 8.6; 5 TABLET, FILM COATED ORAL at 08:39

## 2023-07-21 RX ADMIN — DOCUSATE SODIUM 50 MG AND SENNOSIDES 8.6 MG 2 TABLET: 8.6; 5 TABLET, FILM COATED ORAL at 20:19

## 2023-07-21 RX ADMIN — INSULIN DETEMIR 10 UNITS: 100 INJECTION, SOLUTION SUBCUTANEOUS at 20:22

## 2023-07-21 RX ADMIN — ACETAMINOPHEN 650 MG: 325 TABLET, FILM COATED ORAL at 08:48

## 2023-07-21 RX ADMIN — METOPROLOL SUCCINATE 25 MG: 25 TABLET, FILM COATED, EXTENDED RELEASE ORAL at 08:38

## 2023-07-21 RX ADMIN — DRONEDARONE 400 MG: 400 TABLET, FILM COATED ORAL at 20:24

## 2023-07-21 RX ADMIN — Medication 5000 UNITS: at 20:19

## 2023-07-21 RX ADMIN — Medication 10 ML: at 08:39

## 2023-07-21 RX ADMIN — FOLIC ACID 5 MG: 1 TABLET ORAL at 08:38

## 2023-07-21 RX ADMIN — OXYCODONE HYDROCHLORIDE AND ACETAMINOPHEN 1000 MG: 500 TABLET ORAL at 20:19

## 2023-07-21 RX ADMIN — Medication 400 MG: at 20:19

## 2023-07-21 RX ADMIN — THERA TABS 1 TABLET: TAB at 20:19

## 2023-07-21 RX ADMIN — Medication 400 MG: at 08:38

## 2023-07-21 RX ADMIN — FLUTICASONE PROPIONATE 2 SPRAY: 50 SPRAY, METERED NASAL at 08:40

## 2023-07-21 RX ADMIN — METOPROLOL SUCCINATE 25 MG: 25 TABLET, FILM COATED, EXTENDED RELEASE ORAL at 20:19

## 2023-07-21 RX ADMIN — MEROPENEM 1000 MG: 1 INJECTION, POWDER, FOR SOLUTION INTRAVENOUS at 15:00

## 2023-07-21 NOTE — PROGRESS NOTES
[facility] Medicine Services  INPATIENT PROGRESS NOTE    Length of Stay: 4  Date of Admission: 7/16/2023  Primary Care Physician: Jamaal Bravo MD    Subjective   Chief Complaint: diabetic foot  HPI:  63-year-old male with diabetes mellitus with chronic left foot ulcer and osteomyelitis of left foot, Charcot joint, s/p removal of external fixator with tibial talocalcaneal arthrodesis in May 2023, followed by podiatry with LLE wrapped in compression boot, recently hospitalized with sepsis, fever with Enterobacter bacteremia treated with Zosyn.  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     He presented back after developing fevers/chills, had sinus tachycardia, lactic acidosis, elevated CRP, admitted with sepsis.  Empirically treated with broad-spectrum antibiotics and fluid resuscitation.   There was no clear evidence of superficial soft tissue infection, although x-rays of lower extremity did show soft tissue swelling, otherwise with previous fusion with intact hardware.   He was subsequently found to have recurrence of Enterobacter cloace bacteremia.  Given known issues with chronic osteomyelitis and significantly elevated CRP, most likely source of bacteremia would be related to this.  Vancomycin discontinued and continued on IV cefepime pending further culture sensitivities. Progressive pancytopenia, probable bone marrow toxicity, will change antibiotic. MRI was not possible to do because of pacemaker compatibility. Will order CT left foot and leg.     7/20/2023: Patient doing well today.  Up working with PT.  Discussed negative CT results with patient having only severe Charcot which was a known issue.  Discussed discharge planning, patient does not feel that he will be able  to do the IV antibiotics at home due to dexterity issues with his hands and is not agreeable to rehab.  Will discuss possible LTAC versus outpatient antibiotic therapy with case management/pharmacy.    7/21/2023: Doing well today.  No complaints.      As of today, 07/21/23  Review of Systems   Constitutional:  Positive for chills and diaphoresis. Negative for activity change, appetite change and fatigue.   HENT:  Negative for congestion, ear discharge, hearing loss, rhinorrhea, sinus pain, sneezing and sore throat.    Eyes:  Negative for photophobia, discharge and visual disturbance.   Respiratory:  Negative for cough, chest tightness, shortness of breath and wheezing.    Cardiovascular:  Negative for chest pain and palpitations.   Gastrointestinal:  Negative for abdominal distention, abdominal pain, blood in stool, diarrhea, nausea and vomiting.   Endocrine: Negative for cold intolerance, heat intolerance, polydipsia, polyphagia and polyuria.   Genitourinary:  Negative for dysuria, flank pain, hematuria and urgency.   Musculoskeletal:  Negative for arthralgias, joint swelling and myalgias.   Skin:  Negative for color change.   Allergic/Immunologic: Negative for food allergies.   Neurological:  Negative for dizziness, seizures, syncope, speech difficulty, weakness and headaches.   Hematological:  Negative for adenopathy. Does not bruise/bleed easily.   Psychiatric/Behavioral:  Negative for confusion, hallucinations, self-injury and suicidal ideas. The patient is not nervous/anxious.       All pertinent negatives and positives are as above. All other systems have been reviewed and are negative unless otherwise stated.    Objective    Temp:  [97.6 °F (36.4 °C)-98.9 °F (37.2 °C)] 98.9 °F (37.2 °C)  Heart Rate:  [74-88] 88  Resp:  [18] 18  BP: (102-130)/(53-84) 102/53    AM-PAC 6 Clicks Score (PT): 17 (07/21/23 0849)    As of today, 07/21/23  Physical Exam  Constitutional:       Appearance: Normal appearance.   HENT:       Head: Normocephalic and atraumatic.      Right Ear: Tympanic membrane normal.      Left Ear: Tympanic membrane normal.      Nose: Nose normal.      Mouth/Throat:      Mouth: Mucous membranes are moist.   Eyes:      Pupils: Pupils are equal, round, and reactive to light.   Cardiovascular:      Rate and Rhythm: Normal rate and regular rhythm.      Pulses: Normal pulses.      Heart sounds: Normal heart sounds.   Pulmonary:      Effort: Pulmonary effort is normal.      Breath sounds: Normal breath sounds.   Abdominal:      General: Abdomen is flat. Bowel sounds are normal.      Palpations: Abdomen is soft.   Musculoskeletal:         General: Deformity present. Normal range of motion.      Cervical back: Normal range of motion and neck supple.      Left lower leg: Edema present.      Comments: Bilateral Charcot foot, transmetatarsal amputation right foot, deformities left foot.   Skin:     General: Skin is warm and dry.      Capillary Refill: Capillary refill takes less than 2 seconds.   Neurological:      General: No focal deficit present.      Mental Status: He is alert and oriented to person, place, and time.   Psychiatric:         Mood and Affect: Mood normal.         Behavior: Behavior normal.         Thought Content: Thought content normal.         Judgment: Judgment normal.         Results Review:  I have reviewed the labs, radiology results, and diagnostic studies.    Laboratory Data:   Results from last 7 days   Lab Units 07/21/23  0610 07/20/23  0540 07/19/23  0536 07/17/23  0535 07/16/23  0913   SODIUM mmol/L 136 138 138   < > 133*   POTASSIUM mmol/L 3.9 3.9 3.8   < > 4.1   CHLORIDE mmol/L 99 100 103   < > 96*   CO2 mmol/L 26.0 28.0 26.0   < > 20.0*   BUN mg/dL 12 14 14   < > 28*   CREATININE mg/dL 0.76 0.90 0.92   < > 1.16   GLUCOSE mg/dL 142* 175* 174*   < > 242*   CALCIUM mg/dL 9.1 8.9 8.6   < > 8.5*   BILIRUBIN mg/dL  --   --   --   --  1.3*   ALK PHOS U/L  --   --   --   --  106   ALT (SGPT) U/L  --    --   --   --  23   AST (SGOT) U/L  --   --   --   --  17   ANION GAP mmol/L 11.0 10.0 9.0   < > 17.0*    < > = values in this interval not displayed.       Estimated Creatinine Clearance: 158.8 mL/min (by C-G formula based on SCr of 0.76 mg/dL).          Results from last 7 days   Lab Units 07/21/23  0610 07/20/23  0540 07/19/23  0536 07/18/23  0318 07/17/23  0535   WBC 10*3/mm3 4.04 3.17* 2.55* 2.99* 4.49   HEMOGLOBIN g/dL 11.9* 10.8* 10.3* 9.5* 10.4*   HEMATOCRIT % 36.5* 32.4* 31.3* 28.8* 31.5*   PLATELETS 10*3/mm3 160 123* 108* 80* 91*       Results from last 7 days   Lab Units 07/16/23  0913   INR  1.46*         Culture Data:   Blood Culture   Date Value Ref Range Status   07/19/2023 No growth at 24 hours  Preliminary   07/19/2023 No growth at 24 hours  Preliminary       No results found for: URINECX  No results found for: RESPCX  No results found for: WOUNDCX  No results found for: STOOLCX  No components found for: BODYFLD    Radiology Data:   Imaging Results (Last 24 Hours)       ** No results found for the last 24 hours. **            I have utilized all available immediate resources to obtain, update, or review the patient's current medications (including all prescriptions, over-the-counter products, herbals, cannabis/cannabidiol products, and vitamin/mineral/dietary (nutritional) supplements).       Assessment/Plan     Active Hospital Problems    Diagnosis     **Septicemia due to Enterobacter     Sepsis     Fever, unknown origin        Plan:    Sepsis, most likely infectious source recurrent chronic osteomyelitis, markedly elevated CRP, other possibility could be septic joint or infected hardware, however hardware appears intact on x-rays. Enterobacter cloacae complex, pending susceptibility, on cefepime.  ---In any event, it would require weeks of IV antibiotics, would lean towards completing 6 weeks of IV antibiotics for more definitive treatment of likely recurrent osteomyelitis  --Recurrent Enterobacter  bacteremia without source control  --Repeat blood culture to ensure clearance  - Unable to obtain MRI as unable to verify device compatibility  --Follow fever curve  --Lactic acidosis improved with fluids  --Podiatry do not consider intervention  -- Continue skin care     Chronic HFrEF, EF 30 to 35% on echo in June, AICD present  Cautious with any further fluid resuscitation     A-fib  Monitor telemetry  Rate control on metoprolol twice daily  Anticoagulated on Eliquis    Progressive pancytopenia, bone marrow toxicity because of antibiotics, start folic acid, check b12 levels, discontinue cefepime (>10% hematologic reactions and start iv meropenem, consult hematology.    Type 2 diabetes, hyperglycemia because of sepsis, start basal insulin levemir and follow.     History of DVT/PE  Anticoagulated on Eliquis    Wants a new bed, bigger. Wants in the future home health to IV antibiotics en case he needs them            Medical Decision Making  Number and Complexity of problems: 5  Differential Diagnosis: none    Conditions and Status:        Condition is unchanged.     MDM Data  External documents reviewed: none  My EKG interpretation: sinus tach  My plain film interpretation: dual chamber pacemaker  Tests considered but not ordered: none     Decision rules/scores evaluated (example [id number], Wells, etc): 5     Discussed with: patient and nurse staff     Treatment Plan  Dc cefepime  Start meropenem  CT extremity left  Levemir  7/20/2023: Discussed with pharmacy about changing from Merrem as it is 3 times a day and the patient does not believe that he will have the dexterity to do IV antibiotics at home home health teaches him.  He is refusing rehab.  We will discuss LTAC and possible outpatient antibiotics with case management, pharmacy.  Improvement in pancytopenia noted today.    7/21/2023: Pancytopenia resolved.  Plans for discharge tomorrow, scheduled to start outpatient infusion of once daily Invanz at 10:00  tomorrow morning.    Care Planning  Shared decision making: none  Code status and discussions: full code    Disposition  Social Determinants of Health that impact treatment or disposition: none  I expect the patient to be discharged to home in 7 days.       I confirmed that the patient's Advance Care Plan is present, code status is documented, or surrogate decision maker is listed in the patient's medical record.      This document has been electronically signed by Christi Cuenca PA-C on July 21, 2023 09:58 CDT

## 2023-07-21 NOTE — PROGRESS NOTES
Podiatric Surgery Progress Note    Subjective   Patient continues to improve. NAD    Objective     Vital signs in last 24 hours:  Temp:  [97.6 °F (36.4 °C)-98.9 °F (37.2 °C)] 98.9 °F (37.2 °C)  Heart Rate:  [74-88] 88  Resp:  [18] 18  BP: (102-130)/(53-84) 102/53    General: alert, appears stated age, and cooperative   Neurovascular: SILT  Capillary refill: Normal   Wound: No open wounds    Range of Motion: Limited dorsiflexion, Limited plantarflexion, Limited inversion, and Limited eversion   DVT Exam: No evidence of DVT seen on physical exam.     Data Review  CBC:  Results from last 7 days   Lab Units 07/21/23  0610   WBC 10*3/mm3 4.04   RBC 10*6/mm3 4.36   HEMOGLOBIN g/dL 11.9*   HEMATOCRIT % 36.5*   PLATELETS 10*3/mm3 160       Assessment & Plan     Bacteremia     Patient improving. Agree with 6 weeks IV antibiotics and referral to see ID specialist. Wound nurse to apply compression wrap to LLE today. No surgical intervention planned. Podiatry will sign off at this time. Call with questions.      LOS: 4 days     Marco A Thompson DPM    Date: 7/21/2023  Time: 11:29 CDT

## 2023-07-21 NOTE — PROGRESS NOTES
Adult Nutrition  Assessment    Patient Name:  Emre Lisa  YOB: 1960  MRN: [id number]  Admit Date:  7/16/2023    Assessment Date:  7/21/2023    Comments:  64yo male seen by RD for LOS. Pt admit w/ sepsis likely r.t to chronic left foot ulcer w/ hx DM and HF. Pt is s/p removal of external fixator in May 2023. Cardiac ADA diet and intakes mostly 100% meals for LOS. # w/ BMI 39.0. Alb wnl on admit. Pt notes allergy to shellfish, no c/s problems. He has no questions r/t diabetic diet or weight loss, keeps 30-45g carbs most meals. He notes he has had a BM in last few days despite flowsheet noting last +BM 7/15. RD with no nutritional concerns at this time. RD to follow hospital course.        Electronically signed by:  Sapna Huerta RD  07/21/23 13:24 CDT

## 2023-07-21 NOTE — THERAPY TREATMENT NOTE
Patient Name: Emre Lisa  : 1960    MRN: [id number]                              Today's Date: 2023       Admit Date: 2023    Visit Dx:     ICD-10-CM ICD-9-CM   1. Fever of unknown origin  R50.9 780.60   2. Sepsis due to other etiology  A41.89 038.8   3. Impaired functional mobility, balance, gait, and endurance [Z74.09 (ICD-10-CM)]  Z74.09 V49.89   4. Impaired mobility and activities of daily living [Z74.09, Z78.9 (ICD-10-CM)]  Z74.09 V49.89    Z78.9    5. Septicemia due to Enterobacter  A41.4 038.3     Patient Active Problem List   Diagnosis    Foot osteomyelitis, right    Nodule of groin    Type 2 diabetes mellitus with skin complication    Status post transmetatarsal amputation of right foot    Hammer toe of left foot    Hallux malleus of left foot    Cellulitis of left foot    Infected hardware in left leg    Chronic multifocal osteomyelitis of left foot    Charcot's joint of left foot    Skin ulcer of left foot, limited to breakdown of skin    Dyspnea    Syncope    Acute respiratory failure with hypoxia    Tachycardia    Neuropathy due to secondary diabetes mellitus    Atrial fibrillation    Presence of biventricular cardiac pacemaker    Hypertension    Medically complex patient    Fever of unknown origin    Bacteremia    Primary osteoarthritis of knees, bilateral    Sepsis    Fever, unknown origin    Septicemia due to Enterobacter     Past Medical History:   Diagnosis Date    Arthritis     Atrial fibrillation     Diabetes mellitus     Diabetic foot ulcer associated with type 2 diabetes mellitus     left great toe    Hallux malleus of left foot     Hammer toe     Hypertension     MI (myocardial infarction)     Neuropathy in diabetes     Onychomycosis     Presence of biventricular cardiac pacemaker     Rheumatoid arthritis      Past Surgical History:   Procedure Laterality Date    AMPUTATION DIGIT Right 2017    Procedure: RIGHT AMPUTATION TRANSMETATRASAL , RECESSION  GASTROCNEMOUS;  Surgeon: Marco A Thompson DPM;  Location: [facility] OR;  Service:     ANKLE FUSION Left 04/13/2023    Procedure: REDUCTION OF LEFT ANKLE DEFORMITY WITH APPLICATION OF EXTERNAL FIXATOR;  Surgeon: Marco A Thompson DPM;  Location: [facility] OR;  Service: Podiatry;  Laterality: Left;    CARDIAC CATHETERIZATION      CARDIAC DEFIBRILLATOR PLACEMENT  2010, 2016    CARPAL TUNNEL RELEASE  2015    elbow and wrist left arm    FOOT FUSION Left 05/15/2023    Procedure: REMOVAL OF EXTERNAL FIXATOR LEFT LOWER EXTREMITY WITH TIBIAL TALOCALCANEAL ARTHRODESIS  AND ALL INDICATED PROCEDURES;  Surgeon: Marco A Thompson DPM;  Location: [facility] OR;  Service: Podiatry;  Laterality: Left;    FOOT IRRIGATION, DEBRIDEMENT AND REPAIR Left 03/07/2018    Procedure: FOOT IRRIGATION, DEBRIDEMENT AND REPAIR;  Surgeon: Marco A Thompson DPM;  Location: [facility] OR;  Service:     FOOT IRRIGATION, DEBRIDEMENT AND REPAIR Left 03/10/2018    Procedure: FOOT IRRIGATION, DEBRIDEMENT AND REPAIR;  Surgeon: Marco A Thompson DPM;  Location: [facility] OR;  Service: Podiatry    FOOT WOUND CLOSURE Right 03/02/2017    Procedure: INCISION AND DRAINAGE, RIGHT FOOT;  Surgeon: Tung Rosenthal DPM;  Location: [facility] OR;  Service:     HAMMER TOE REPAIR Left 02/15/2018    Procedure: HAMMERTOE CORRECTION LEFT SECOND, THIRD AND FOURTH TOES AND HALLUX INTERPHALANGEAL JOINT ARTHRODESIS LEFT FOOT (Micro Asnis, 4.0 & 5.0 Asnis)      (c-arm);  Surgeon: Marco A Thompson DPM;  Location: [facility] OR;  Service:     HARDWARE REMOVAL Left 03/05/2018    Procedure: ANKLE/FOOT HARDWARE REMOVAL;  Surgeon: Marco A Thompson DPM;  Location: [facility] OR;  Service:     INCISION AND DRAINAGE LEG Left 03/05/2018    Procedure: INCISION AND DRAINAGE LOWER EXTREMITY;  Surgeon: Marco A Thompson DPM;  Location: [facility] OR;  Service:     PLANTAR FASCIA RELEASE Left 11/21/2019    Procedure: PLANTAR PLANING LEFT FOOT AND ALL OTHER INDICATED PROCEDURES;  Surgeon: Marco A Thompson DPM;  Location: [facility] OR;   Service: Podiatry    TOE AMPUTATION Right 2016    TONSILECTOMY, ADENOIDECTOMY, BILATERAL MYRINGOTOMY AND TUBES      age 25      General Information       Row Name 07/21/23 1315          OT Time and Intention    Document Type therapy note (daily note)  -     Mode of Treatment individual therapy;occupational therapy  -       Row Name 07/21/23 1315          General Information    Patient Profile Reviewed yes  -KD     Existing Precautions/Restrictions fall  -KD       Row Name 07/21/23 1315          Cognition    Orientation Status (Cognition) oriented x 4  -KD       Row Name 07/21/23 1315          Safety Issues, Functional Mobility    Impairments Affecting Function (Mobility) balance;endurance/activity tolerance;range of motion (ROM);sensation/sensory awareness;strength  -               User Key  (r) = Recorded By, (t) = Taken By, (c) = Cosigned By      Initials Name Provider Type     Kim Fernandez COTA Occupational Therapist Assistant                     Mobility/ADL's       Row Name 07/21/23 1507          Bed Mobility    Supine-Sit [place] (Bed Mobility) standby assist  -KD     Sit-Supine [place] (Bed Mobility) standby assist  -KD     Assistive Device (Bed Mobility) bed rails;head of bed elevated  -       Row Name 07/21/23 1507          Transfers    Comment, (Transfers) Bed> stretcher  -       Row Name 07/21/23 1507          Sit-Stand Transfer    Sit-Stand [place] (Transfers) supervision  -     Assistive Device (Sit-Stand Transfers) walker, front-wheeled  -KD       Row Name 07/21/23 1507          Stand-Sit Transfer    Stand-Sit [place] (Transfers) standby assist  -KD     Assistive Device (Stand-Sit Transfers) walker, front-wheeled  -KD       Row Name 07/21/23 1507          Functional Mobility    Functional Mobility- Ind. Level contact guard assist  -KD     Functional Mobility- Device walker, front-wheeled  -KD     Functional Mobility-Distance (Feet) 4  -KD               User Key   (r) = Recorded By, (t) = Taken By, (c) = Cosigned By      Initials Name Provider Type    Kim Horowitz COTA Occupational Therapist Assistant                   Obj/Interventions       Row Name 07/21/23 1315          Shoulder (Therapeutic Exercise)    Shoulder AROM (Therapeutic Exercise) bilateral;flexion;extension;aBduction;aDduction;external rotation;internal rotation;horizontal aBduction/aDduction  -       Row Name 07/21/23 1315          Elbow/Forearm (Therapeutic Exercise)    Elbow/Forearm (Therapeutic Exercise) AROM (active range of motion)  -KD     Elbow/Forearm AROM (Therapeutic Exercise) bilateral;flexion;extension;supination;pronation  -       Row Name 07/21/23 1315          Wrist (Therapeutic Exercise)    Wrist (Therapeutic Exercise) AROM (active range of motion)  -     Wrist Strengthening (Therapeutic Exercise) bilateral;flexion;extension  -       Row Name 07/21/23 1315          Hand (Therapeutic Exercise)    Hand (Therapeutic Exercise) AROM (active range of motion)  -KD     Hand AROM/AAROM (Therapeutic Exercise) bilateral;finger flexion;finger extension  -       Row Name 07/21/23 1315          Motor Skills    Therapeutic Exercise shoulder;elbow/forearm;wrist;hand  -KD               User Key  (r) = Recorded By, (t) = Taken By, (c) = Cosigned By      Initials Name Provider Type    Kim Horowitz COTA Occupational Therapist Assistant                   Goals/Plan       Row Name 07/21/23 1315          Transfer Goal 1 (OT)    Activity/Assistive Device (Transfer Goal 1, OT) transfers, all  -KD     [place] Level/Cues Needed (Transfer Goal 1, OT) modified independence  -KD     Time Frame (Transfer Goal 1, OT) long term goal (LTG)  -KD     Progress/Outcome (Transfer Goal 1, OT) goal not met  -KD       Row Name 07/21/23 1315          Bathing Goal 1 (OT)    Activity/Device (Bathing Goal 1, OT) bathing skills, all  -KD     [place] Level/Cues Needed (Bathing Goal 1, OT) supervision  required  -KD     Time Frame (Bathing Goal 1, OT) long term goal (LTG)  -KD     Progress/Outcomes (Bathing Goal 1, OT) goal not met  -KD       Row Name 07/21/23 1315          Dressing Goal 1 (OT)    Activity/Device (Dressing Goal 1, OT) dressing skills, all  -KD     [place]/Cues Needed (Dressing Goal 1, OT) supervision required  -KD     Time Frame (Dressing Goal 1, OT) long term goal (LTG)  -KD     Progress/Outcome (Dressing Goal 1, OT) goal not met  -KD       Row Name 07/21/23 1315          Toileting Goal 1 (OT)    Activity/Device (Toileting Goal 1, OT) toileting skills, all  -KD     [place] Level/Cues Needed (Toileting Goal 1, OT) modified independence  -KD     Time Frame (Toileting Goal 1, OT) long term goal (LTG)  -KD     Progress/Outcome (Toileting Goal 1, OT) goal not met  -KD               User Key  (r) = Recorded By, (t) = Taken By, (c) = Cosigned By      Initials Name Provider Type     Kim Fernandez COTA Occupational Therapist Assistant                   Clinical Impression       Row Name 07/21/23 1315          Pain Assessment    Pretreatment Pain Rating 4/10  -KD     Posttreatment Pain Rating 4/10  -KD     Pain Location - neck  -KD     Pain Intervention(s) Nursing Notified  -       Row Name 07/21/23 1315          Plan of Care Review    Progress no change  -KD     Outcome Evaluation Pt agreeable to OT this date. Pt performed BUE ther ex in all aplnes w/ 2lb HW and good tolerance w/ RB's PRN. Sup>sit-SBA. Pt sat EOB-SBA. Sit>stand-SBA. Fxl mobility ~4' SBA/CGA of 1 w/ RW. Pt leaving floor on stretcher w/ MT @ this time. Cont OT POC.  -KD       Row Name 07/21/23 1315          Therapy Assessment/Plan (OT)    Therapy Frequency (OT) other (see comments)  5-7 days a week.  -KD       Row Name 07/21/23 1315          Therapy Plan Review/Discharge Plan (OT)    Anticipated Discharge Disposition (OT) home with assist;home with 24/7 care;home with home health  -       Row Name 07/21/23 1315           Vital Signs    Pre Systolic BP Rehab 133  -KD     Pre Treatment Diastolic BP 81  -KD     Pretreatment Heart Rate (beats/min) 75  -KD     Pre SpO2 (%) 97  -KD     O2 Delivery Pre Treatment room air  -KD     Pre Patient Position Supine  -KD     Intra Patient Position Standing  -KD     Post Patient Position Supine  -KD       Row Name 07/21/23 1315          Positioning and Restraints    Pre-Treatment Position in bed  -KD     Post Treatment Position bed  -KD     In Bed fowlers  Leaving floor w/ MT  -KD               User Key  (r) = Recorded By, (t) = Taken By, (c) = Cosigned By      Initials Name Provider Type    Kim Horowitz COTA Occupational Therapist Assistant                   Outcome Measures       Row Name 07/21/23 1315          How much help from another is currently needed...    Putting on and taking off regular lower body clothing? 2  -KD     Bathing (including washing, rinsing, and drying) 2  -KD     Toileting (which includes using toilet bed pan or urinal) 2  -KD     Putting on and taking off regular upper body clothing 3  -KD     Taking care of personal grooming (such as brushing teeth) 4  -KD     Eating meals 4  -KD     AM-PAC 6 Clicks Score (OT) 17  -KD       Row Name 07/21/23 1100 07/21/23 0849       How much help from another person do you currently need...    Turning from your back to your side while in flat bed without using bedrails? 4  -TA 4  -ML    Moving from lying on back to sitting on the side of a flat bed without bedrails? 3  -TA 2  -ML    Moving to and from a bed to a chair (including a wheelchair)? 3  -TA 3  -ML    Standing up from a chair using your arms (e.g., wheelchair, bedside chair)? 3  -TA 3  -ML    Climbing 3-5 steps with a railing? 2  -TA 2  -ML    To walk in hospital room? 3  -TA 3  -ML    AM-PAC 6 Clicks Score (PT) 18  -TA 17  -ML    Highest level of mobility 6 --> Walked 10 steps or more  -TA 5 --> Static standing  -ML      Row Name 07/21/23 1100          Functional  Assessment    Outcome Measure Options AM-PAC 6 Clicks Basic Mobility (PT)  -TA               User Key  (r) = Recorded By, (t) = Taken By, (c) = Cosigned By      Initials Name Provider Type    Magda Jimenez, CHENTE Physical Therapist Assistant    Kim Horowitz COTA Occupational Therapist Assistant    Jamar Raymond, LOUIEN Licensed Nurse                    Occupational Therapy Education       Title: PT OT SLP Therapies (Done)       Topic: Occupational Therapy (Done)       Point: ADL training (Done)       Description:   Instruct learner(s) on proper safety adaptation and remediation techniques during self care or transfers.   Instruct in proper use of assistive devices.                  Learning Progress Summary             Patient Acceptance, E,TB, VU by CB at 7/21/2023 0025    Acceptance, E,TB, VU by LW at 7/20/2023 1448    Acceptance, E,TB, VU by CB at 7/20/2023 0451    Acceptance, E, VU by BB at 7/19/2023 1405    Comment: Home safety/fall prevention and equipment needs discussed with pt.    Acceptance, E,TB, VU by BB at 7/19/2023 1337    Acceptance, E,TB, VU by BB at 7/18/2023 1322                         Point: Home exercise program (Done)       Description:   Instruct learner(s) on appropriate technique for monitoring, assisting and/or progressing therapeutic exercises/activities.                  Learning Progress Summary             Patient Acceptance, E,TB, VU by CB at 7/21/2023 0025    Acceptance, E,TB, VU by LW at 7/20/2023 1448    Acceptance, E,TB, VU by CB at 7/20/2023 0451                         Point: Precautions (Done)       Description:   Instruct learner(s) on prescribed precautions during self-care and functional transfers.                  Learning Progress Summary             Patient Acceptance, E,TB, VU by CB at 7/21/2023 0025    Acceptance, E,TB, VU by LW at 7/20/2023 1448    Acceptance, E,TB, VU by CB at 7/20/2023 0451    Acceptance, E, VU by RB at 7/17/2023 1317    Comment: Edu pt on  use of gait belt and non skid socks when OOB and no OOB without assist.                         Point: Body mechanics (Done)       Description:   Instruct learner(s) on proper positioning and spine alignment during self-care, functional mobility activities and/or exercises.                  Learning Progress Summary             Patient Acceptance, E,TB, VU by CB at 7/21/2023 0025    Acceptance, E,TB, VU by LW at 7/20/2023 1448    Acceptance, E,TB, VU by CB at 7/20/2023 0451    Acceptance, E, VU by BB at 7/19/2023 1405    Comment: Home safety/fall prevention and equipment needs discussed with pt.    Acceptance, E,TB, VU by BB at 7/19/2023 1337    Acceptance, E,TB, VU by BB at 7/18/2023 1322                                         User Key       Initials Effective Dates Name Provider Type Discipline    RB 07/11/23 -  Sincere Dyer OT Occupational Therapist OT    BB 06/16/21 -  Paola Fortune COTA Occupational Therapist Assistant OT    LW 06/16/21 -  Oanh Pacheco COTA Occupational Therapist Assistant OT    CB 02/14/22 -  Nida Irwin RN Registered Nurse Nurse                  OT Recommendation and Plan  Therapy Frequency (OT): other (see comments) (5-7 days a week.)  Plan of Care Review  Progress: no change  Outcome Evaluation: Pt agreeable to OT this date. Pt performed BUE ther ex in all aplnes w/ 2lb HW and good tolerance w/ RB's PRN. Sup>sit-SBA. Pt sat EOB-SBA. Sit>stand-SBA. Fxl mobility ~4' SBA/CGA of 1 w/ RW. Pt leaving floor on stretcher w/ MT @ this time. Cont OT POC.     Time Calculation:         Time Calculation- OT       Row Name 07/21/23 1315             Time Calculation- OT    OT Start Time 1315  -KD      OT Stop Time 1331  -KD      OT Time Calculation (min) 16 min  -KD      Total Timed Code Minutes- OT 16 minute(s)  -KD      OT Received On 07/21/23  -KD         Timed Charges    58462 - OT Therapeutic Exercise Minutes 16  -KD         Total Minutes    Timed Charges Total Minutes 16  -KD        Total Minutes 16  -KD                User Key  (r) = Recorded By, (t) = Taken By, (c) = Cosigned By      Initials Name Provider Type    Kim Horowitz COTA Occupational Therapist Assistant                  Therapy Charges for Today       Code Description Service Date Service Provider Modifiers Qty    [id number]  OT THER PROC EA 15 MIN 7/21/2023 Kim Fernandez COTA GO 1                 ZIGGY Morse  7/21/2023

## 2023-07-21 NOTE — PLAN OF CARE
Goal Outcome Evaluation:  Plan of Care Reviewed With: patient        Progress: no change  Outcome Evaluation: Patient resting in bed at this time.  Dressing changed to left foot per wound care today.  Patient is aware to wear boot marcus getting up.  No s/s of distress noted.  Will continue to monitor.

## 2023-07-21 NOTE — NURSING NOTE
LIZZIE garcia and coban placed for compression per request from Dr. Thompson.  Will follow up if still inpatient on Monday.  Otherwise, patient to follow up outpatient.

## 2023-07-21 NOTE — DISCHARGE PLACEMENT REQUEST
"Attn: Galina  Pt will need daily antibiotics starting from 07/22/2023      Amee Lisa (63 y.o. Male)       Date of Birth   1960    Social Security Number       Address   [facility] [place] [address]    Home Phone   [phone]    MRN   [id number]       Zoroastrian   Gnosticism    Marital Status                               Admission Date   7/16/23    Admission Type   Emergency    Admitting Provider   Chente Ross MD    Attending Provider   Chente Ross MD    Department, Room/Bed   [address], 372/1       Discharge Date       Discharge Disposition       Discharge Destination                                 Attending Provider: Chente Ross MD    Allergies: Heparin, Januvia [Sitagliptin], Lipitor [Atorvastatin], Shellfish Allergy, Sulfa Antibiotics    Isolation: None   Infection: None   Code Status: CPR    Ht: 193 cm (75.98\")   Wt: 146 kg (320 lb 14.4 oz)    Admission Cmt: None   Principal Problem: Septicemia due to Enterobacter [A41.4]                   Active Insurance as of 7/16/2023       Primary Coverage       Payor Plan Insurance Group Employer/Plan Group    MEDICARE MEDICARE A & B        Payor Plan Address Payor Plan Phone Number Payor Plan Fax Number Effective Dates    [address] [phone]  12/1/2012 - None Entered    [facility] [id number]         Subscriber Name Subscriber Birth Date Member ID       AMEE LISA 1960 [id number]               Secondary Coverage       Payor Plan Insurance Group Employer/Plan Group    [employer] [id number]       Payor Plan Address Payor Plan Phone Number Payor Plan Fax Number Effective Dates    [address]   3/16/2021 - None Entered    [facility] [id number]         Subscriber Name Subscriber Birth Date Member ID       AMEE LISA 1960 [id number]                     Emergency Contacts        (Rel.) Home Phone Work Phone Mobile " Phone    Reymundo Lisa (Spouse) [phone] -- [phone]                 Physician Progress Notes (last 48 hours)        Laila Fuller MD at 07/20/23 1644            Subjective     Emre Lisa was seen in follow up.   Overall stable.   No fever or chills.   Feels better.     Past Medical History, Past Surgical History, Social History, Family History have been reviewed and are without significant changes except as mentioned.    Review of Systems   Constitutional:  Positive for activity change and fatigue. Negative for fever and unexpected weight change.   HENT:  Negative for congestion, hearing loss, sore throat and voice change.    Respiratory:  Negative for cough, chest tightness, shortness of breath and wheezing.    Cardiovascular:  Positive for leg swelling. Negative for chest pain and palpitations.   Gastrointestinal:  Negative for abdominal distention, abdominal pain, constipation, diarrhea, nausea and vomiting.   Endocrine: Negative for cold intolerance, heat intolerance and polydipsia.   Genitourinary:  Negative for difficulty urinating, dysuria and hematuria.   Musculoskeletal:  Positive for arthralgias, back pain and gait problem.   Skin:  Negative for color change, pallor and rash.   Neurological:  Positive for numbness. Negative for dizziness, weakness and light-headedness.   Hematological:  Negative for adenopathy. Bruises/bleeds easily.   Psychiatric/Behavioral:  Negative for agitation, confusion, dysphoric mood and hallucinations.           Medications:  The current medication list was reviewed in the EMR    ALLERGIES:    Allergies   Allergen Reactions    Heparin Other (See Comments)     Heparin induce thrombocytopenia     Januvia [Sitagliptin] Hives    Lipitor [Atorvastatin] Other (See Comments)     Muscle Cramps...    Shellfish Allergy Swelling and Other (See Comments)     Age 13 this occurred doesn't remember what he ate swelled lips and face    Sulfa Antibiotics Rash     Pt  was age of 4 when he was told by his family he had a reaction, pt is unsure        Objective      Vitals:    07/20/23 0322 07/20/23 0734 07/20/23 0845 07/20/23 1500   BP: 151/95  135/74 124/67   BP Location: Right arm  Right arm Right arm   Patient Position: Lying  Lying Lying   Pulse: 75 73 74 75   Resp: 18  18 18   Temp: 98 °F (36.7 °C)  97.7 °F (36.5 °C) 97.6 °F (36.4 °C)   TempSrc: Temporal  Temporal Infrared   SpO2: 97%  97% 97%   Weight:       Height:              No data to display                Physical Exam  Vitals and nursing note reviewed.   Constitutional:       General: He is not in acute distress.     Appearance: He is obese.   HENT:      Mouth/Throat:      Mouth: Mucous membranes are moist.      Pharynx: No oropharyngeal exudate.   Eyes:      General: No scleral icterus.     Pupils: Pupils are equal, round, and reactive to light.   Cardiovascular:      Rate and Rhythm: Normal rate and regular rhythm.      Heart sounds: No murmur heard.  Pulmonary:      Effort: Pulmonary effort is normal. No respiratory distress.      Breath sounds: Normal breath sounds.   Abdominal:      General: There is no distension.      Palpations: Abdomen is soft.      Tenderness: There is no abdominal tenderness.   Musculoskeletal:      Right lower leg: No edema.      Left lower leg: Edema present.   Skin:     General: Skin is dry.      Coloration: Skin is not pale.      Findings: Bruising present.   Neurological:      General: No focal deficit present.      Mental Status: He is alert and oriented to person, place, and time. Mental status is at baseline.   Psychiatric:         Mood and Affect: Mood normal.         Behavior: Behavior normal.         Thought Content: Thought content normal.             RECENT LABS: Independently reviewed and summarized  Hematology WBC   Date Value Ref Range Status   07/20/2023 3.17 (L) 3.40 - 10.80 10*3/mm3 Final     RBC   Date Value Ref Range Status   07/20/2023 3.85 (L) 4.14 - 5.80 10*6/mm3  Final     Hemoglobin   Date Value Ref Range Status   07/20/2023 10.8 (L) 13.0 - 17.7 g/dL Final     Hematocrit   Date Value Ref Range Status   07/20/2023 32.4 (L) 37.5 - 51.0 % Final     Platelets   Date Value Ref Range Status   07/20/2023 123 (L) 140 - 450 10*3/mm3 Final       LAB RESULTS:      Lab 07/20/23  0540 07/19/23  0536 07/18/23  0318 07/17/23  0535 07/17/23  0000 07/16/23  1727 07/16/23  1436 07/16/23  1206 07/16/23  0913   WBC 3.17* 2.55* 2.99* 4.49  --   --   --   --  5.43   HEMOGLOBIN 10.8* 10.3* 9.5* 10.4*  --   --   --   --  11.3*   HEMATOCRIT 32.4* 31.3* 28.8* 31.5*  --   --   --   --  33.9*   PLATELETS 123* 108* 80* 91*  --   --   --   --  112*   NEUTROS ABS 1.54* 1.12* 1.97 3.23  --   --   --   --  4.77   IMMATURE GRANS (ABS) 0.01 0.01  --   --   --   --   --   --  0.03   LYMPHS ABS 1.10 0.89  --   --   --   --   --   --  0.43*   MONOS ABS 0.38 0.40  --   --   --   --   --   --  0.19   EOS ABS 0.13 0.11 0.06  --   --   --   --   --  0.00   MCV 84.2 85.3 85.5 85.6  --   --   --   --  84.8   SED RATE  --   --   --  23*  --   --   --   --   --    CRP  --   --   --   --   --   --   --   --  11.00*   LACTATE  --   --   --   --  1.0 2.3* 2.5* 2.5* 4.7*   PROTIME  --   --   --   --   --   --   --   --  17.6*         Lab 07/20/23  0540 07/19/23  0536 07/18/23  0317 07/17/23  0535 07/16/23  0913   SODIUM 138 138 138 136 133*   POTASSIUM 3.9 3.8 3.8 3.8 4.1   CHLORIDE 100 103 104 103 96*   CO2 28.0 26.0 25.0 24.0 20.0*   ANION GAP 10.0 9.0 9.0 9.0 17.0*   BUN 14 14 17 17 28*   CREATININE 0.90 0.92 0.82 0.93 1.16   EGFR 97.2 94.6 99.9 93.4 71.7   GLUCOSE 175* 174* 157* 125* 242*   CALCIUM 8.9 8.6 8.3* 8.2* 8.5*   TSH  --   --   --   --  0.439         Lab 07/16/23  0913   TOTAL PROTEIN 7.2   ALBUMIN 3.8   GLOBULIN 3.4   ALT (SGPT) 23   AST (SGOT) 17   BILIRUBIN 1.3*   ALK PHOS 106         Lab 07/16/23  0913   HSTROP T 30*   PROTIME 17.6*   INR 1.46*             Lab 07/20/23  0540   IRON 72   IRON SATURATION  (TSAT) 21   TIBC 335   TRANSFERRIN 225   FERRITIN 366.50   FOLATE >20.00   VITAMIN B 12 390         Brief Urine Lab Results  (Last result in the past 365 days)        Color   Clarity   Blood   Leuk Est   Nitrite   Protein   CREAT   Urine HCG        07/20/23 0558 Yellow   Cloudy   Negative   Negative   Negative   Negative                 Microbiology Results (last 10 days)       Procedure Component Value - Date/Time    Blood Culture - Blood, Arm, Right [[id number]]  (Normal) Collected: 07/19/23 0947    Lab Status: Preliminary result Specimen: Blood from Arm, Right Updated: 07/20/23 1001     Blood Culture No growth at 24 hours    Blood Culture - Blood, Hand, Right [[id number]]  (Normal) Collected: 07/19/23 0947    Lab Status: Preliminary result Specimen: Blood from Hand, Right Updated: 07/20/23 1001     Blood Culture No growth at 24 hours    Respiratory Panel PCR w/COVID-19(SARS-CoV-2) TRICIA/SABI/ALFA/PAD/COR/MAD/RUBI In-House, NP Swab in UTM/VTM, 3-4 HR TAT - Swab, Nasopharynx [[id number]]  (Normal) Collected: 07/16/23 1035    Lab Status: Final result Specimen: Swab from Nasopharynx Updated: 07/16/23 1135     ADENOVIRUS, PCR Not Detected     Coronavirus 229E Not Detected     Coronavirus HKU1 Not Detected     Coronavirus NL63 Not Detected     Coronavirus OC43 Not Detected     COVID19 Not Detected     Human Metapneumovirus Not Detected     Human Rhinovirus/Enterovirus Not Detected     Influenza A PCR Not Detected     Influenza B PCR Not Detected     Parainfluenza Virus 1 Not Detected     Parainfluenza Virus 2 Not Detected     Parainfluenza Virus 3 Not Detected     Parainfluenza Virus 4 Not Detected     RSV, PCR Not Detected     Bordetella pertussis pcr Not Detected     Bordetella parapertussis PCR Not Detected     Chlamydophila pneumoniae PCR Not Detected     Mycoplasma pneumo by PCR Not Detected    Narrative:      In the setting of a positive respiratory panel with a viral infection PLUS a negative procalcitonin without other  underlying concern for bacterial infection, consider observing off antibiotics or discontinuation of antibiotics and continue supportive care. If the respiratory panel is positive for atypical bacterial infection (Bordetella pertussis, Chlamydophila pneumoniae, or Mycoplasma pneumoniae), consider antibiotic de-escalation to target atypical bacterial infection.    Blood Culture - Blood, Arm, Left [[id number]]  (Abnormal) Collected: 07/16/23 1001    Lab Status: Final result Specimen: Blood from Arm, Left Updated: 07/20/23 0530     Blood Culture Enterobacter cloacae complex     Isolated from Aerobic Bottle     Gram Stain Aerobic Bottle Gram negative bacilli     Comment: 2nd bottle positive          Narrative:      Refer to previous blood culture collected on 07/16/2023 1052 for MICs      Blood Culture - Blood, Arm, Left [[id number]]  (Abnormal)  (Susceptibility) Collected: 07/16/23 0952    Lab Status: Final result Specimen: Blood from Arm, Left Updated: 07/20/23 0518     Blood Culture Enterobacter cloacae complex     Comment:   This organism may develop resistance during prolonged therapy with 3rd generation cephalosporins as a result of de-repression of AmpC B-lactamase. Testing repeat isolates may be warranted if clinical status fails to improve.        Isolated from Anaerobic Bottle     Gram Stain Anaerobic Bottle Gram negative bacilli    Susceptibility        Enterobacter cloacae complex      MARTINA      Cefepime Susceptible      Ceftazidime Susceptible      Ceftriaxone Susceptible      Gentamicin Susceptible      Levofloxacin Susceptible      Piperacillin + Tazobactam Susceptible      Trimethoprim + Sulfamethoxazole Susceptible                       Susceptibility Comments       Enterobacter cloacae complex    Cefazolin sensitivity will not be reported for Enterobacteriaceae in non-urine isolates. If cefazolin is preferred, please call the microbiology lab to request an E-test.  This organism may develop resistance  during prolonged therapy with 3rd-generation cephalosporins (e.g. ceftriaxone) as a result of de-repression of AmpC B-lactamase. Testing repeat isolates or an ID consult may be warranted if clinical status fails to improve.  With the exception of urinary-sourced infections, aminoglycosides should not be used as monotherapy.               Blood Culture ID, PCR - Blood, Arm, Left [[id number]]  (Abnormal) Collected: 07/16/23 0946    Lab Status: Final result Specimen: Blood from Arm, Left Updated: 07/18/23 0451     BCID, PCR Enterobacter cloacae complex. Identification by BCID2 PCR.    Narrative:      No resistance genes detected.               Diagnosis:   (1) Neutropenia   (2) Anemia   (3) Thrombocytopenia   (4) Heparin-induced thrombocytopenia  (5) Bilateral PE   (6) Bilateral DVT   (7) Sepsis     Assessment & Plan     (1) Neutropenia     Neutropenia likely secondary to sepsis/antibiotic related.  Absolute neutrophil count is improving.  B12 folic acid was normal.  No further fever or chills.  No role for growth factor support.  Continue to monitor.    (2) Anemia     Suspect anemia related to chronic illness.  Hemoglobin has remained stable.  No bleeding.  Continue monitor.    (3) Thrombocytopenia     Thrombocytopenia slowly improving.  Platelet count is 123,000.  This dropped to 80,000 and now slowly improving.  Likely secondary to sepsis antibiotic.  No bleeding.  No heparin exposure.  Continue monitor.    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  He is currently on Eliquis.  No heparin exposure since then.  Heparin has been listed as allergy.  Please avoid all heparin products.  Continue Eliquis.  Monitor for bleeding closely.      (7) Sepsis     Patient with Enterobacter bacteremia.  He is currently on IV antibiotic.   Source unclear.  Management per primary team.      7/20/2023      CC:            Electronically signed by Laila Fuller MD at 07/20/23 7994       Christi Cuenca PA-C at 07/20/23 1115              [facility] Medicine Services  INPATIENT PROGRESS NOTE    Length of Stay: 3  Date of Admission: 7/16/2023  Primary Care Physician: Jamaal Bravo MD    Subjective   Chief Complaint: diabetic foot  HPI:  63-year-old male with diabetes mellitus with chronic left foot ulcer and osteomyelitis of left foot, Charcot joint, s/p removal of external fixator with tibial talocalcaneal arthrodesis in May 2023, followed by podiatry with LLE wrapped in compression boot, recently hospitalized with sepsis, fever with Enterobacter bacteremia treated with Zosyn.  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     He presented back after developing fevers/chills, had sinus tachycardia, lactic acidosis, elevated CRP, admitted with sepsis.  Empirically treated with broad-spectrum antibiotics and fluid resuscitation.   There was no clear evidence of superficial soft tissue infection, although x-rays of lower extremity did show soft tissue swelling, otherwise with previous fusion with intact hardware.   He was subsequently found to have recurrence of Enterobacter cloace bacteremia.  Given known issues with chronic osteomyelitis and significantly elevated CRP, most likely source of bacteremia would be related to this.  Vancomycin discontinued and continued on IV cefepime pending further culture sensitivities. Progressive pancytopenia, probable bone marrow toxicity, will change antibiotic. MRI was not possible to do because of pacemaker compatibility. Will order CT left foot and leg.     7/20/2023: Patient  doing well today.  Up working with PT.  Discussed negative CT results with patient having only severe Charcot which was a known issue.  Discussed discharge planning, patient does not feel that he will be able to do the IV antibiotics at home due to dexterity issues with his hands and is not agreeable to rehab.  Will discuss possible LTAC versus outpatient antibiotic therapy with case management/pharmacy.      As of today, 07/20/23  Review of Systems   Constitutional:  Positive for chills and diaphoresis. Negative for activity change, appetite change and fatigue.   HENT:  Negative for congestion, ear discharge, hearing loss, rhinorrhea, sinus pain, sneezing and sore throat.    Eyes:  Negative for photophobia, discharge and visual disturbance.   Respiratory:  Negative for cough, chest tightness, shortness of breath and wheezing.    Cardiovascular:  Negative for chest pain and palpitations.   Gastrointestinal:  Negative for abdominal distention, abdominal pain, blood in stool, diarrhea, nausea and vomiting.   Endocrine: Negative for cold intolerance, heat intolerance, polydipsia, polyphagia and polyuria.   Genitourinary:  Negative for dysuria, flank pain, hematuria and urgency.   Musculoskeletal:  Negative for arthralgias, joint swelling and myalgias.   Skin:  Negative for color change.   Allergic/Immunologic: Negative for food allergies.   Neurological:  Negative for dizziness, seizures, syncope, speech difficulty, weakness and headaches.   Hematological:  Negative for adenopathy. Does not bruise/bleed easily.   Psychiatric/Behavioral:  Negative for confusion, hallucinations, self-injury and suicidal ideas. The patient is not nervous/anxious.       All pertinent negatives and positives are as above. All other systems have been reviewed and are negative unless otherwise stated.    Objective    Temp:  [97.1 °F (36.2 °C)-98.1 °F (36.7 °C)] 97.7 °F (36.5 °C)  Heart Rate:  [72-84] 74  Resp:  [18-19] 18  BP:  (134-151)/(70-95) 135/74    AM-PAC 6 Clicks Score (PT): 18 (07/19/23 2003)    As of today, 07/20/23  Physical Exam  Constitutional:       Appearance: Normal appearance.   HENT:      Head: Normocephalic and atraumatic.      Right Ear: Tympanic membrane normal.      Left Ear: Tympanic membrane normal.      Nose: Nose normal.      Mouth/Throat:      Mouth: Mucous membranes are moist.   Eyes:      Pupils: Pupils are equal, round, and reactive to light.   Cardiovascular:      Rate and Rhythm: Normal rate and regular rhythm.      Pulses: Normal pulses.      Heart sounds: Normal heart sounds.   Pulmonary:      Effort: Pulmonary effort is normal.      Breath sounds: Normal breath sounds.   Abdominal:      General: Abdomen is flat. Bowel sounds are normal.      Palpations: Abdomen is soft.   Musculoskeletal:         General: Deformity present. Normal range of motion.      Cervical back: Normal range of motion and neck supple.      Left lower leg: Edema present.      Comments: Bilateral Charcot foot, transmetatarsal amputation right foot, deformities left foot.   Skin:     General: Skin is warm and dry.      Capillary Refill: Capillary refill takes less than 2 seconds.   Neurological:      General: No focal deficit present.      Mental Status: He is alert and oriented to person, place, and time.   Psychiatric:         Mood and Affect: Mood normal.         Behavior: Behavior normal.         Thought Content: Thought content normal.         Judgment: Judgment normal.         Results Review:  I have reviewed the labs, radiology results, and diagnostic studies.    Laboratory Data:   Results from last 7 days   Lab Units 07/20/23  0540 07/19/23  0536 07/18/23  0317 07/17/23  0535 07/16/23  0913   SODIUM mmol/L 138 138 138   < > 133*   POTASSIUM mmol/L 3.9 3.8 3.8   < > 4.1   CHLORIDE mmol/L 100 103 104   < > 96*   CO2 mmol/L 28.0 26.0 25.0   < > 20.0*   BUN mg/dL 14 14 17   < > 28*   CREATININE mg/dL 0.90 0.92 0.82   < > 1.16    GLUCOSE mg/dL 175* 174* 157*   < > 242*   CALCIUM mg/dL 8.9 8.6 8.3*   < > 8.5*   BILIRUBIN mg/dL  --   --   --   --  1.3*   ALK PHOS U/L  --   --   --   --  106   ALT (SGPT) U/L  --   --   --   --  23   AST (SGOT) U/L  --   --   --   --  17   ANION GAP mmol/L 10.0 9.0 9.0   < > 17.0*    < > = values in this interval not displayed.       Estimated Creatinine Clearance: 134.1 mL/min (by C-G formula based on SCr of 0.9 mg/dL).          Results from last 7 days   Lab Units 07/20/23  0540 07/19/23  0536 07/18/23  0318 07/17/23  0535 07/16/23  0913   WBC 10*3/mm3 3.17* 2.55* 2.99* 4.49 5.43   HEMOGLOBIN g/dL 10.8* 10.3* 9.5* 10.4* 11.3*   HEMATOCRIT % 32.4* 31.3* 28.8* 31.5* 33.9*   PLATELETS 10*3/mm3 123* 108* 80* 91* 112*       Results from last 7 days   Lab Units 07/16/23  0913   INR  1.46*         Culture Data:   No results found for: BLOODCX    No results found for: URINECX  No results found for: RESPCX  No results found for: WOUNDCX  No results found for: STOOLCX  No components found for: BODYFLD    Radiology Data:   Imaging Results (Last 24 Hours)       Procedure Component Value Units Date/Time    CT Lower Extremity Left With Contrast [[id number]] Collected: 07/19/23 1305     Updated: 07/19/23 1648    Narrative:      INDICATION:  Soft tissue mass, ankle, vascular mass suspected.    COMPARISON:  None relevant.    TECHNIQUE:  Helical CT centered about the left ankle was performed with intravenous  contrast.  Multiplanar reformations were provided.    FINDINGS:  Considerable bony destruction involves the talocrural, subtalar, and midfoot  articulations with essential complete obliteration and fragmentation of the  talus.  Arthrodesis nail and screw transfix the tibia and calcaneus with  additional screw transfixing calcaneocuboid joint, neither area with significant  fusion.  Considerable soft tissue edema and stranding and bony fragments are  seen throughout the soft tissues.  No evidence of hardware  loosening.    Prior amputation of the distal aspect of the fibula.  No acute fracture or  dislocation is suspected.      Impression:      Considerable bony destruction of the ankle and midfoot likely consistent with  severe Charcot arthropathy.    No soft tissue neoplasm is suspected.                I have utilized all available immediate resources to obtain, update, or review the patient's current medications (including all prescriptions, over-the-counter products, herbals, cannabis/cannabidiol products, and vitamin/mineral/dietary (nutritional) supplements).       Assessment/Plan     Active Hospital Problems    Diagnosis     **Septicemia due to Enterobacter     Sepsis     Fever, unknown origin        Plan:    Sepsis, most likely infectious source recurrent chronic osteomyelitis, markedly elevated CRP, other possibility could be septic joint or infected hardware, however hardware appears intact on x-rays. Enterobacter cloacae complex, pending susceptibility, on cefepime.  ---In any event, it would require weeks of IV antibiotics, would lean towards completing 6 weeks of IV antibiotics for more definitive treatment of likely recurrent osteomyelitis  --Recurrent Enterobacter bacteremia without source control  --Repeat blood culture to ensure clearance  - Unable to obtain MRI as unable to verify device compatibility  --Follow fever curve  --Lactic acidosis improved with fluids  --Podiatry do not consider intervention  -- Continue skin care     Chronic HFrEF, EF 30 to 35% on echo in June, AICD present  Cautious with any further fluid resuscitation     A-fib  Monitor telemetry  Rate control on metoprolol twice daily  Anticoagulated on Eliquis    Progressive pancytopenia, bone marrow toxicity because of antibiotics, start folic acid, check b12 levels, discontinue cefepime (>10% hematologic reactions and start iv meropenem, consult hematology.    Type 2 diabetes, hyperglycemia because of sepsis, start basal insulin  levemir and follow.     History of DVT/PE  Anticoagulated on Eliquis    Wants a new bed, bigger. Wants in the future home health to IV antibiotics en case he needs them            Medical Decision Making  Number and Complexity of problems: 5  Differential Diagnosis: none    Conditions and Status:        Condition is unchanged.     [facility] Data  External documents reviewed: none  My EKG interpretation: sinus tach  My plain film interpretation: dual chamber pacemaker  Tests considered but not ordered: none     Decision rules/scores evaluated (example [id number], Wells, etc): 5     Discussed with: patient and nurse staff     Treatment Plan  Dc cefepime  Start meropenem  CT extremity left  Levemir  7/20/2023: Discussed with pharmacy about changing from [place] as it is 3 times a day and the patient does not believe that he will have the dexterity to do IV antibiotics at home home health teaches him.  He is refusing rehab.  We will discuss LTAC and possible outpatient antibiotics with case management, pharmacy.  Improvement in pancytopenia noted today.      Care Planning  Shared decision making: none  Code status and discussions: full code    Disposition  Social Determinants of Health that impact treatment or disposition: none  I expect the patient to be discharged to home in 7 days.       I confirmed that the patient's Advance Care Plan is present, code status is documented, or surrogate decision maker is listed in the patient's medical record.      This document has been electronically signed by Christi Cuenca PA-C on July 20, 2023 09:35 CDT      Electronically signed by Christi Cuenca PA-C at 07/20/23 1631       Marco A Thompson, DPTORI at 07/19/23 1230          Podiatric Surgery Progress Note    Subjective   Patient continues to improve. NAD    Objective     Vital signs in last 24 hours:  Temp:  [97.6 °F (36.4 °C)-99 °F (37.2 °C)] 97.6 °F (36.4 °C)  Heart Rate:  [] 71  Resp:  [18-19] 18  BP: (124-133)/(69-78)  128/78    General: alert, appears stated age, and cooperative   Neurovascular: SILT  Capillary refill: Normal   Wound: No open wounds    Range of Motion: Limited dorsiflexion, Limited plantarflexion, Limited inversion, and Limited eversion   DVT Exam: No evidence of DVT seen on physical exam.     Data Review  CBC:  Results from last 7 days   Lab Units 23  0536   WBC 10*3/mm3 2.55*   RBC 10*6/mm3 3.67*   HEMOGLOBIN g/dL 10.3*   HEMATOCRIT % 31.3*   PLATELETS 10*3/mm3 108*       Assessment & Plan     Bacteremia     Patient improving. Urinalysis with bacteria. Urine culture not obtained. Continue IV antibiotics. No surgical intervention planned. Call with questions.      LOS: 2 days     Marco A Thompson DPM    Date: 2023  Time: 12:30 CDT      Electronically signed by Marco A Thompson DPM at 23 1231       [address] [address]  Phone:  [phone]  Fax:  [phone] Date: 2023      Ambulatory Referral to [facility] For Infusion Treatment     Patient:  Emre Lisa MRN:  [id number]   [address] [facility] [address] :  1960  SSN:    Phone: [phone] Sex:  M      INSURANCE PAYOR PLAN GROUP # SUBSCRIBER ID   Primary:  Secondary:    MEDICARE  [employer] [id number]  [id number]    [id number]  [id number]      Referring Provider Information:  JIMENA SALAS Phone: [phone] Fax: [phone]       Referral Information:   # Visits:  1 Referral Type: Infusion [106]   Urgency:  Routine Referral Reason: Specialty Services Required   Start Date: 2023 End Date:  To be determined by Insurer   Diagnosis: Septicemia due to Enterobacter (A41.4 [ICD-10-CM] 038.3 [ICD-9-CM])      Refer to Dept:  [facility] INFU NON ONC  Refer to Provider:   Refer to Provider Phone:   Refer to Facility:       What is the duration of the infusion treatment? 1 Hr     This document serves as a request of  services and does not constitute Insurance authorization or approval of services.  To determine eligibility, please contact the members Insurance carrier to verify and review coverage.     If you have medical questions regarding this request for services. Please contact [address] at [phone] during normal business hours.        Authorizing Provider:Christi Cuenca PA-C  Authorizing Provider's NPI: [id number]  Order Entered By: Christi Cuenca PA-C 7/21/2023 10:35 AM     Electronically signed by: Christi Cuenca PA-C 7/21/2023 10:35 AM

## 2023-07-21 NOTE — PLAN OF CARE
Goal Outcome Evaluation:  Plan of Care Reviewed With: patient        Progress: improving  Outcome Evaluation: pt sup<>sit & sit<>stand with SBA, pt ambulated 100` & 250` & 200` with RW & SBA with walking boot on L LE. pt would benefit from home with assistance & HHPT      Anticipated Discharge Disposition (PT): home with home health, home with assist

## 2023-07-21 NOTE — THERAPY TREATMENT NOTE
Acute Care - Physical Therapy Treatment Note  [facility]     Patient Name: Emre Lisa  : 1960  MRN: [id number]  Today's Date: 2023      Visit Dx:     ICD-10-CM ICD-9-CM   1. Fever of unknown origin  R50.9 780.60   2. Sepsis due to other etiology  A41.89 038.8   3. Impaired functional mobility, balance, gait, and endurance [Z74.09 (ICD-10-CM)]  Z74.09 V49.89   4. Impaired mobility and activities of daily living [Z74.09, Z78.9 (ICD-10-CM)]  Z74.09 V49.89    Z78.9    5. Septicemia due to Enterobacter  A41.4 038.3     Patient Active Problem List   Diagnosis    Foot osteomyelitis, right    Nodule of groin    Type 2 diabetes mellitus with skin complication    Status post transmetatarsal amputation of right foot    Hammer toe of left foot    Hallux malleus of left foot    Cellulitis of left foot    Infected hardware in left leg    Chronic multifocal osteomyelitis of left foot    Charcot's joint of left foot    Skin ulcer of left foot, limited to breakdown of skin    Dyspnea    Syncope    Acute respiratory failure with hypoxia    Tachycardia    Neuropathy due to secondary diabetes mellitus    Atrial fibrillation    Presence of biventricular cardiac pacemaker    Hypertension    Medically complex patient    Fever of unknown origin    Bacteremia    Primary osteoarthritis of knees, bilateral    Sepsis    Fever, unknown origin    Septicemia due to Enterobacter     Past Medical History:   Diagnosis Date    Arthritis     Atrial fibrillation     Diabetes mellitus     Diabetic foot ulcer associated with type 2 diabetes mellitus     left great toe    Hallux malleus of left foot     Hammer toe     Hypertension     MI (myocardial infarction)     Neuropathy in diabetes     Onychomycosis     Presence of biventricular cardiac pacemaker     Rheumatoid arthritis      Past Surgical History:   Procedure Laterality Date    AMPUTATION DIGIT Right 2017    Procedure: RIGHT AMPUTATION TRANSMETATRASAL , RECESSION  GASTROCNEMOUS;  Surgeon: Marco A Thompson DPM;  Location: [facility] OR;  Service:     ANKLE FUSION Left 04/13/2023    Procedure: REDUCTION OF LEFT ANKLE DEFORMITY WITH APPLICATION OF EXTERNAL FIXATOR;  Surgeon: Marco A Thompson DPM;  Location: [facility] OR;  Service: Podiatry;  Laterality: Left;    CARDIAC CATHETERIZATION      CARDIAC DEFIBRILLATOR PLACEMENT  2010, 2016    CARPAL TUNNEL RELEASE  2015    elbow and wrist left arm    FOOT FUSION Left 05/15/2023    Procedure: REMOVAL OF EXTERNAL FIXATOR LEFT LOWER EXTREMITY WITH TIBIAL TALOCALCANEAL ARTHRODESIS  AND ALL INDICATED PROCEDURES;  Surgeon: Marco A Thompson DPM;  Location: [facility] OR;  Service: Podiatry;  Laterality: Left;    FOOT IRRIGATION, DEBRIDEMENT AND REPAIR Left 03/07/2018    Procedure: FOOT IRRIGATION, DEBRIDEMENT AND REPAIR;  Surgeon: Marco A Thompson DPM;  Location: [facility] OR;  Service:     FOOT IRRIGATION, DEBRIDEMENT AND REPAIR Left 03/10/2018    Procedure: FOOT IRRIGATION, DEBRIDEMENT AND REPAIR;  Surgeon: Marco A Thompson DPM;  Location: [facility] OR;  Service: Podiatry    FOOT WOUND CLOSURE Right 03/02/2017    Procedure: INCISION AND DRAINAGE, RIGHT FOOT;  Surgeon: Tung Rosenthal DPM;  Location: [facility] OR;  Service:     HAMMER TOE REPAIR Left 02/15/2018    Procedure: HAMMERTOE CORRECTION LEFT SECOND, THIRD AND FOURTH TOES AND HALLUX INTERPHALANGEAL JOINT ARTHRODESIS LEFT FOOT (Micro Asnis, 4.0 & 5.0 Asnis)      (c-arm);  Surgeon: Marco A Thompson DPM;  Location: [facility] OR;  Service:     HARDWARE REMOVAL Left 03/05/2018    Procedure: ANKLE/FOOT HARDWARE REMOVAL;  Surgeon: Marco A Thompson DPM;  Location: [facility] OR;  Service:     INCISION AND DRAINAGE LEG Left 03/05/2018    Procedure: INCISION AND DRAINAGE LOWER EXTREMITY;  Surgeon: Marco A Thompson DPM;  Location: [facility] OR;  Service:     PLANTAR FASCIA RELEASE Left 11/21/2019    Procedure: PLANTAR PLANING LEFT FOOT AND ALL OTHER INDICATED PROCEDURES;  Surgeon: Marco A Thompson DPM;  Location: [facility] OR;   Service: Podiatry    TOE AMPUTATION Right 2016    TONSILECTOMY, ADENOIDECTOMY, BILATERAL MYRINGOTOMY AND TUBES      age 25     PT Assessment (last 12 hours)       PT Evaluation and Treatment       Row Name 07/21/23 0958          Physical Therapy Time and Intention    Subjective Information complains of;pain  -TA     Document Type therapy note (daily note)  -TA     Mode of Treatment physical therapy  -TA       Row Name 07/21/23 0958          General Information    Patient Profile Reviewed yes  -TA     Existing Precautions/Restrictions fall  -TA       Row Name 07/21/23 0958          Pain    Pretreatment Pain Rating 4/10  -TA     Posttreatment Pain Rating 4/10  -TA     Pain Location - neck;shoulder  -TA       Row Name 07/21/23 0958          Cognition    Orientation Status (Cognition) oriented x 4  -TA     Personal Safety Interventions fall prevention program maintained;gait belt;muscle strengthening facilitated;nonskid shoes/slippers when out of bed;supervised activity  -TA       Row Name 07/21/23 0958          Range of Motion Comprehensive    General Range of Motion lower extremity range of motion deficits identified  -TA       Row Name 07/21/23 0958          Bed Mobility    Bed Mobility supine-sit  -TA     Supine-Sit [place] (Bed Mobility) standby assist  -TA     Sit-Supine [place] (Bed Mobility) standby assist  -TA     Assistive Device (Bed Mobility) bed rails;head of bed elevated  -TA       Row Name 07/21/23 0958          Sit-Stand Transfer    Sit-Stand [place] (Transfers) supervision  -TA     Assistive Device (Sit-Stand Transfers) walker, front-wheeled  -TA       Row Name 07/21/23 0958          Stand-Sit Transfer    Stand-Sit [place] (Transfers) standby assist  -TA     Assistive Device (Stand-Sit Transfers) walker, front-wheeled  -TA       Row Name 07/21/23 0958          Gait/Stairs (Locomotion)    [place] Level (Gait) supervision  -TA     Assistive Device (Gait) walker, front-wheeled  -TA      Distance in Feet (Gait) 100` & 250` & 200`  with W/C to follow for rest breaks  -TA     Deviations/Abnormal Patterns (Gait) gait speed decreased;base of support, wide  -TA     Bilateral Gait Deviations forward flexed posture  -TA       Row Name 07/21/23 0958          Safety Issues, Functional Mobility    Impairments Affecting Function (Mobility) balance;endurance/activity tolerance;range of motion (ROM);sensation/sensory awareness;strength  -TA       Row Name 07/21/23 0958          Hip (Therapeutic Exercise)    Hip (Therapeutic Exercise) AROM (active range of motion)  -TA     Hip AROM (Therapeutic Exercise) bilateral;flexion;aBduction;aDduction;sitting;20 repititions  -TA       Row Name 07/21/23 0958          Knee (Therapeutic Exercise)    Knee (Therapeutic Exercise) AROM (active range of motion)  -TA     Knee AROM (Therapeutic Exercise) bilateral;LAQ (long arc quad);sitting;20 repititions  -TA       Row Name 07/21/23 0958          Ankle (Therapeutic Exercise)    Ankle (Therapeutic Exercise) AROM (active range of motion)  -TA     Ankle AROM (Therapeutic Exercise) bilateral;dorsiflexion;plantarflexion;sitting;20 repititions  -TA       Row Name             Wound 04/14/23 2157 Left medial gluteal    Wound - Properties Group Placement Date: 04/14/23  -ML Placement Time: 2157  -ML Present on Hospital Admission: Y  -ML, it was passed on in report pt came in with wound, pt stated wound was present on admission  Side: Left  -ML Orientation: medial  -ML Location: gluteal  -ML    Retired Wound - Properties Group Placement Date: 04/14/23  -ML Placement Time: 2157  -ML Present on Hospital Admission: Y  -ML, it was passed on in report pt came in with wound, pt stated wound was present on admission  Side: Left  -ML Orientation: medial  -ML Location: gluteal  -ML    Retired Wound - Properties Group Date first assessed: 04/14/23  -ML Time first assessed: 2157  -ML Present on Hospital Admission: Y  -ML, it was passed on in  report pt came in with wound, pt stated wound was present on admission  Side: Left  -ML Location: gluteal  -ML      Row Name             Wound 05/15/23 1546 Left anterior foot Incision    Wound - Properties Group Placement Date: 05/15/23  -HP Placement Time: 1546  -HP Present on Hospital Admission: Y  -HP Side: Left  -HP Orientation: anterior  -HP Location: foot  -HP Primary Wound Type: Incision  -HP Additional Comments: adaptic, bacitracin ointment, 4x4, abd, webril, ace, boot  -HP    Retired Wound - Properties Group Placement Date: 05/15/23  -HP Placement Time: 1546  -HP Present on Hospital Admission: Y  -HP Side: Left  -HP Orientation: anterior  -HP Location: foot  -HP Primary Wound Type: Incision  -HP Additional Comments: adaptic, bacitracin ointment, 4x4, abd, webril, ace, boot  -HP    Retired Wound - Properties Group Date first assessed: 05/15/23  -HP Time first assessed: 1546  -HP Present on Hospital Admission: Y  -HP Side: Left  -HP Location: foot  -HP Primary Wound Type: Incision  -HP Additional Comments: adaptic, bacitracin ointment, 4x4, abd, webril, ace, boot  -HP      Row Name             Wound 06/03/23 0920 Left lower leg    Wound - Properties Group Placement Date: 06/03/23  -LW Placement Time: 0920 -LW Side: Left  -LW Orientation: lower  -LW Location: leg  -LW    Retired Wound - Properties Group Placement Date: 06/03/23  -LW Placement Time: 0920 -LW Side: Left  -LW Orientation: lower  -LW Location: leg  -LW    Retired Wound - Properties Group Date first assessed: 06/03/23  -LW Time first assessed: 0920  -LW Side: Left  -LW Location: leg  -LW      Row Name             Wound 06/03/23 0922 Left posterior heel    Wound - Properties Group Placement Date: 06/03/23  -LW Placement Time: 0922 -LW Side: Left  -LW Orientation: posterior  -LW Location: heel  -LW    Retired Wound - Properties Group Placement Date: 06/03/23  -LW Placement Time: 0922  -LW Side: Left  -LW Orientation: posterior  -LW Location: heel   -LW    Retired Wound - Properties Group Date first assessed: 06/03/23  -LW Time first assessed: 0922 -LW Side: Left  -LW Location: heel  -LW      Row Name             Wound 06/03/23 0923 Left anterior ankle    Wound - Properties Group Placement Date: 06/03/23  -LW Placement Time: 0923 -LW Side: Left  -LW Orientation: anterior  -LW Location: ankle  -LW    Retired Wound - Properties Group Placement Date: 06/03/23 -LW Placement Time: 0923 -LW Side: Left  -LW Orientation: anterior  -LW Location: ankle  -LW    Retired Wound - Properties Group Date first assessed: 06/03/23  -LW Time first assessed: 0923 -LW Side: Left  -LW Location: ankle  -LW      Row Name 07/21/23 0958          Plan of Care Review    Plan of Care Reviewed With patient  -TA     Progress improving  -TA     Outcome Evaluation pt sup<>sit & sit<>stand with SBA, pt ambulated 100` & 250` & 200` with RW & SBA with walking boot on L LE. pt would benefit from home with assistance & HHPT  -TA       Row Name 07/21/23 0958          Vital Signs    Pre Systolic BP Rehab 116  -TA     Pre Treatment Diastolic BP 64  -TA     Post Systolic BP Rehab 130  -TA     Post Treatment Diastolic BP 90  -TA     Pretreatment Heart Rate (beats/min) 89  -TA     Intratreatment Heart Rate (beats/min) 94  -TA     Posttreatment Heart Rate (beats/min) 89  -TA     Pre SpO2 (%) 94  -TA     O2 Delivery Pre Treatment room air  -TA     Intra SpO2 (%) 99  -TA     O2 Delivery Intra Treatment room air  -TA     Post SpO2 (%) 95  -TA     O2 Delivery Post Treatment room air  -TA     Pre Patient Position Supine  -TA     Post Patient Position Supine  -TA       Row Name 07/21/23 0958          Positioning and Restraints    Pre-Treatment Position in bed  -TA     Post Treatment Position bed  -TA     In Bed supine;exit alarm on;call light within reach  -TA       Row Name 07/21/23 0958          Therapy Assessment/Plan (PT)    Rehab Potential (PT) good, to achieve stated therapy goals  -TA     Criteria  for Skilled Interventions Met (PT) yes;skilled treatment is necessary  -TA     Therapy Frequency (PT) other (see comments)  5-7 days/week  -TA     Comment, Therapy Assessment/Plan (PT) continue  -TA       Row Name 07/21/23 0958          Bed Mobility Goal 1 (PT)    Activity/Assistive Device (Bed Mobility Goal 1, PT) sit to supine/supine to sit  -TA     [place] Level/Cues Needed (Bed Mobility Goal 1, PT) independent  -TA     Time Frame (Bed Mobility Goal 1, PT) 30 days  -TA     Progress/Outcomes (Bed Mobility Goal 1, PT) goal not met  -TA       Row Name 07/21/23 0958          Transfer Goal 1 (PT)    Activity/Assistive Device (Transfer Goal 1, PT) bed-to-chair/chair-to-bed;sit-to-stand/stand-to-sit;walker, rolling  -TA     [place] Level/Cues Needed (Transfer Goal 1, PT) modified independence  -TA     Time Frame (Transfer Goal 1, PT) by discharge  -TA     Progress/Outcome (Transfer Goal 1, PT) goal not met  -TA       Row Name 07/21/23 0958          Gait Training Goal 1 (PT)    Activity/Assistive Device (Gait Training Goal 1, PT) walker, rolling  -TA     [place] Level (Gait Training Goal 1, PT) modified independence  -TA     Distance (Gait Training Goal 1, PT) 150' X 2  -TA     Time Frame (Gait Training Goal 1, PT) by discharge  -TA     Progress/Outcome (Gait Training Goal 1, PT) goal revised this date  -TA       Row Name 07/21/23 0958          Stairs Goal 1 (PT)    Activity/Assistive Device (Stairs Goal 1, PT) ascending stairs;descending stairs  -TA     [place] Level/Cues Needed (Stairs Goal 1, PT) independent  -TA     Number of Stairs (Stairs Goal 1, PT) 4  -TA     Time Frame (Stairs Goal 1, PT) by discharge  -TA     Progress/Outcome (Stairs Goal 1, PT) goal not met  -TA               User Key  (r) = Recorded By, (t) = Taken By, (c) = Cosigned By      Initials Name Provider Type    Jennifer Diallo, RN Registered Nurse    Magda Jimenez, PTA Physical Therapist Assistant    GARRY Redd  Maryann, RN Registered Nurse    Charley Chowdary RN Registered Nurse                    Physical Therapy Education       Title: PT OT SLP Therapies (Done)       Topic: Physical Therapy (Done)       Point: Mobility training (Done)       Learning Progress Summary             Patient Acceptance, E,TB, VU by CB at 7/21/2023 0025    Acceptance, E, NR by MM at 7/20/2023 1217    Comment: Hand placement to facilitate transfers, use of FWW for safety, adjusting FWW to correct height to prevent forward trunk flexion.    Acceptance, E,TB, VU by CB at 7/20/2023 0451    Acceptance, E, NR by MM at 7/17/2023 1150    Comment: PT POC and goals. Proper hand positioning for transfers, proper use of FWW for safety.                         Point: Home exercise program (Done)       Learning Progress Summary             Patient Acceptance, E,TB, VU by CB at 7/21/2023 0025    Acceptance, E,TB, VU by CB at 7/20/2023 0451                         Point: Body mechanics (Done)       Learning Progress Summary             Patient Acceptance, E,TB, VU by CB at 7/21/2023 0025    Acceptance, E,TB, VU by CB at 7/20/2023 0451                         Point: Precautions (Done)       Learning Progress Summary             Patient Acceptance, E,TB, VU by CB at 7/21/2023 0025    Acceptance, E,TB, VU by CB at 7/20/2023 0451                                         User Key       Initials Effective Dates Name Provider Type Discipline     02/14/22 -  Nida Irwin, RN Registered Nurse Nurse    MM 06/26/23 -  Joelle Doan PT Physical Therapist PT                  PT Recommendation and Plan  Anticipated Discharge Disposition (PT): home with home health, home with assist  Therapy Frequency (PT): other (see comments) (5-7 days/week)  Plan of Care Reviewed With: patient  Progress: improving  Outcome Evaluation: pt sup<>sit & sit<>stand with SBA, pt ambulated 100` & 250` & 200` with RW & SBA with walking boot on L LE. pt would benefit from home with  assistance & HHPT   Outcome Measures       Row Name 07/21/23 1100 07/19/23 1300 07/18/23 1600       How much help from another person do you currently need...    Turning from your back to your side while in flat bed without using bedrails? 4  -TA 3  -TA 3  -TA    Moving from lying on back to sitting on the side of a flat bed without bedrails? 3  -TA 3  -TA 3  -TA    Moving to and from a bed to a chair (including a wheelchair)? 3  -TA 3  -TA 3  -TA    Standing up from a chair using your arms (e.g., wheelchair, bedside chair)? 3  -TA 3  -TA 3  -TA    Climbing 3-5 steps with a railing? 2  -TA 2  -TA 2  -TA    To walk in hospital room? 3  -TA 3  -TA 2  -TA    AM-PAC 6 Clicks Score (PT) 18  -TA 17  -TA 16  -TA       Functional Assessment    Outcome Measure Options AM-PAC 6 Clicks Basic Mobility (PT)  -TA AM-PAC 6 Clicks Basic Mobility (PT)  -TA AM-PAC 6 Clicks Basic Mobility (PT)  -TA              User Key  (r) = Recorded By, (t) = Taken By, (c) = Cosigned By      Initials Name Provider Type    Magda Jimenez PTA Physical Therapist Assistant                     Time Calculation:    PT Charges       Row Name 07/21/23 1152             Time Calculation    Start Time 0958  -TA      Stop Time 1053  -TA      Time Calculation (min) 55 min  -TA      PT Received On 07/21/23  -TA         Time Calculation- PT    Total Timed Code Minutes- PT 55 minute(s)  -TA         Timed Charges    10948 - PT Therapeutic Exercise Minutes 15  -TA      17417 - PT Therapeutic Activity Minutes 40  -TA         Total Minutes    Timed Charges Total Minutes 55  -TA       Total Minutes 55  -TA                User Key  (r) = Recorded By, (t) = Taken By, (c) = Cosigned By      Initials Name Provider Type    Magda Jimenez PTA Physical Therapist Assistant                  Therapy Charges for Today       Code Description Service Date Service Provider Modifiers Qty    61712561613 HC PT THER PROC EA 15 MIN 7/21/2023 Magda Rader PTA GP 1     [id number]  PT THERAPEUTIC ACT EA 15 MIN 7/21/2023 Magda Rader, CHENTE GP 3            PT G-Codes  Outcome Measure Options: AM-PAC 6 Clicks Basic Mobility (PT)  AM-PAC 6 Clicks Score (PT): 18  AM-PAC 6 Clicks Score (OT): 17    Magda Rader PTA  7/21/2023

## 2023-07-21 NOTE — PLAN OF CARE
Goal Outcome Evaluation:           Progress: no change  Outcome Evaluation: Pt agreeable to OT this date. Pt performed BUE ther ex in all aplnes w/ 2lb HW and good tolerance w/ RB's PRN. Sup>sit-SBA. Pt sat EOB-SBA. Sit>stand-SBA. Fxl mobility ~4' SBA/CGA of 1 w/ RW. Pt leaving floor on stretcher w/ MT @ this time. Cont OT POC.      Anticipated Discharge Disposition (OT): home with assist, home with 24/7 care, home with home health

## 2023-07-21 NOTE — PROGRESS NOTES
TWO PATIENT IDENTIFIERS WERE USED. THE PATIENT WAS DRAPED WITH A FULL BODY DRAPE AND THE PATIENT'S RIGHT ARM WAS PREPPED WITH CHLORA PREP. ULTRASOUND WAS USED TO LOCALIZE THERIGHT BASILIC VEIN. SUBCUTANEOUS TISSUE AT THE CATHETER SITE WAS INFILTRATED WITH 2% LIDOCAINE. UNDER ULTRASOUND GUIDANCE, THE VEIN WAS ACCESSED WITH A 21 GAUGE  NEEDLE. AN 0.018 WIRE WAS THEN THREADED THROUGH THE NEEDLE. THE 21 GAUGE NEEDLE WAS REMOVED AND A 4 Danish SHEATH WAS PLACED OVER THE WIRE INTO THE VEIN.THE MIDLINE CATHETER WAS TRIMMED TO 20CM. THE MIDLINE CATHETER WAS THEN PLACED OVER THE WIRE INTO THE VEIN, THE SHEATH WAS PEELED AWAY, WIRE WAS REMOVED. CATHETER WAS FLUSHED WITH NORMAL SALINE AND CATHETER TIP APPLIED. BIOPATCH PLACED. CATHETER SECURED WITH STAT LOCK AND TEGADERM. PATIENT TOLERATED PROCEDURE WELL. THIS WAS DONE IN THE ANGIOSUITE      IMPRESSION:SUCCESSFUL PLACEMENT OF SINGLE LUMEN MIDLINE.           Thu Hudson RN  7/21/2023  13:52 CDT

## 2023-07-21 NOTE — PROGRESS NOTES
Subjective     Emre Lisa was seen in follow-up.  Feels well.  No fever or chills.    Past Medical History, Past Surgical History, Social History, Family History have been reviewed and are without significant changes except as mentioned.    Review of Systems   Constitutional:  Positive for activity change. Negative for fatigue, fever and unexpected weight change.   HENT:  Negative for congestion, hearing loss, sore throat and voice change.    Respiratory:  Negative for cough, chest tightness, shortness of breath and wheezing.    Cardiovascular:  Positive for leg swelling. Negative for chest pain and palpitations.   Gastrointestinal:  Negative for abdominal distention, abdominal pain, blood in stool, constipation, diarrhea, nausea and vomiting.   Genitourinary:  Negative for difficulty urinating, dysuria, frequency and hematuria.   Musculoskeletal:  Positive for arthralgias, back pain and gait problem.   Skin:  Negative for color change, pallor and rash.   Neurological:  Positive for numbness. Negative for dizziness, weakness and headaches.   Hematological:  Negative for adenopathy. Bruises/bleeds easily.   Psychiatric/Behavioral:  Negative for agitation, confusion and dysphoric mood. The patient is not nervous/anxious.           Medications:  The current medication list was reviewed in the EMR    ALLERGIES:    Allergies   Allergen Reactions    Heparin Other (See Comments)     Heparin induce thrombocytopenia     Januvia [Sitagliptin] Hives    Lipitor [Atorvastatin] Other (See Comments)     Muscle Cramps...    Shellfish Allergy Swelling and Other (See Comments)     Age 13 this occurred doesn't remember what he ate swelled lips and face    Sulfa Antibiotics Rash     Pt was age of 4 when he was told by his family he had a reaction, pt is unsure        Objective      Vitals:    07/21/23 0437 07/21/23 0704 07/21/23 0838 07/21/23 1141   BP:   102/53 133/81   BP Location:   Right arm Right arm   Patient Position:    Lying Lying   Pulse: 74 82 88 75   Resp:   18 18   Temp:   98.9 °F (37.2 °C) 97.1 °F (36.2 °C)   TempSrc:   Temporal Oral   SpO2:   96% 97%   Weight:       Height:              No data to display                Physical Exam  Vitals and nursing note reviewed.   Constitutional:       General: He is not in acute distress.     Appearance: Normal appearance.   HENT:      Mouth/Throat:      Mouth: Mucous membranes are moist.      Pharynx: No oropharyngeal exudate.   Cardiovascular:      Rate and Rhythm: Normal rate and regular rhythm.      Heart sounds: No murmur heard.  Pulmonary:      Effort: Pulmonary effort is normal. No respiratory distress.      Breath sounds: Normal breath sounds.   Abdominal:      General: There is no distension.      Palpations: Abdomen is soft. There is no mass.      Tenderness: There is no abdominal tenderness.   Musculoskeletal:      Right lower leg: No edema.      Left lower leg: Edema present.   Skin:     General: Skin is dry.      Coloration: Skin is not pale.      Findings: Bruising present.   Neurological:      General: No focal deficit present.      Mental Status: He is alert and oriented to person, place, and time. Mental status is at baseline.   Psychiatric:         Mood and Affect: Mood normal.         Behavior: Behavior normal.         Thought Content: Thought content normal.         RECENT LABS:Independently reviewed and summarized  Hematology WBC   Date Value Ref Range Status   07/21/2023 4.04 3.40 - 10.80 10*3/mm3 Final     RBC   Date Value Ref Range Status   07/21/2023 4.36 4.14 - 5.80 10*6/mm3 Final     Hemoglobin   Date Value Ref Range Status   07/21/2023 11.9 (L) 13.0 - 17.7 g/dL Final     Hematocrit   Date Value Ref Range Status   07/21/2023 36.5 (L) 37.5 - 51.0 % Final     Platelets   Date Value Ref Range Status   07/21/2023 160 140 - 450 10*3/mm3 Final          LAB RESULTS:      Lab 07/21/23  0610 07/20/23  0540 07/19/23  0536 07/18/23  0318 07/17/23  0535 07/17/23  0000  07/16/23  1727 07/16/23  1436 07/16/23  1206 07/16/23  0913   WBC 4.04 3.17* 2.55* 2.99* 4.49  --   --   --   --  5.43   HEMOGLOBIN 11.9* 10.8* 10.3* 9.5* 10.4*  --   --   --   --  11.3*   HEMATOCRIT 36.5* 32.4* 31.3* 28.8* 31.5*  --   --   --   --  33.9*   PLATELETS 160 123* 108* 80* 91*  --   --   --   --  112*   NEUTROS ABS 1.99 1.54* 1.12* 1.97 3.23  --   --   --   --  4.77   IMMATURE GRANS (ABS) 0.03 0.01 0.01  --   --   --   --   --   --  0.03   LYMPHS ABS 1.45 1.10 0.89  --   --   --   --   --   --  0.43*   MONOS ABS 0.41 0.38 0.40  --   --   --   --   --   --  0.19   EOS ABS 0.14 0.13 0.11 0.06  --   --   --   --   --  0.00   MCV 83.7 84.2 85.3 85.5 85.6  --   --   --   --  84.8   SED RATE  --   --   --   --  23*  --   --   --   --   --    CRP  --   --   --   --   --   --   --   --   --  11.00*   LACTATE  --   --   --   --   --  1.0 2.3* 2.5* 2.5* 4.7*   PROTIME  --   --   --   --   --   --   --   --   --  17.6*         Lab 07/21/23  0610 07/20/23  0540 07/19/23  0536 07/18/23  0317 07/17/23  0535 07/16/23  0913   SODIUM 136 138 138 138 136 133*   POTASSIUM 3.9 3.9 3.8 3.8 3.8 4.1   CHLORIDE 99 100 103 104 103 96*   CO2 26.0 28.0 26.0 25.0 24.0 20.0*   ANION GAP 11.0 10.0 9.0 9.0 9.0 17.0*   BUN 12 14 14 17 17 28*   CREATININE 0.76 0.90 0.92 0.82 0.93 1.16   EGFR 102.3 97.2 94.6 99.9 93.4 71.7   GLUCOSE 142* 175* 174* 157* 125* 242*   CALCIUM 9.1 8.9 8.6 8.3* 8.2* 8.5*   TSH  --   --   --   --   --  0.439         Lab 07/16/23  0913   TOTAL PROTEIN 7.2   ALBUMIN 3.8   GLOBULIN 3.4   ALT (SGPT) 23   AST (SGOT) 17   BILIRUBIN 1.3*   ALK PHOS 106         Lab 07/16/23  0913   HSTROP T 30*   PROTIME 17.6*   INR 1.46*             Lab 07/20/23  0540   IRON 72   IRON SATURATION (TSAT) 21   TIBC 335   TRANSFERRIN 225   FERRITIN 366.50   FOLATE >20.00   VITAMIN B 12 390         Brief Urine Lab Results  (Last result in the past 365 days)        Color   Clarity   Blood   Leuk Est   Nitrite   Protein   CREAT   Urine HCG         07/20/23 0558 Yellow   Cloudy   Negative   Negative   Negative   Negative                 Microbiology Results (last 10 days)       Procedure Component Value - Date/Time    Blood Culture - Blood, Arm, Right [[id number]]  (Normal) Collected: 07/19/23 0947    Lab Status: Preliminary result Specimen: Blood from Arm, Right Updated: 07/21/23 1000     Blood Culture No growth at 2 days    Blood Culture - Blood, Hand, Right [[id number]]  (Normal) Collected: 07/19/23 0947    Lab Status: Preliminary result Specimen: Blood from Hand, Right Updated: 07/21/23 1000     Blood Culture No growth at 2 days    Respiratory Panel PCR w/COVID-19(SARS-CoV-2) [facility] In-House, NP Swab in UTM/VTM, 3-4 HR TAT - Swab, Nasopharynx [[id number]]  (Normal) Collected: 07/16/23 1035    Lab Status: Final result Specimen: Swab from Nasopharynx Updated: 07/16/23 1135     ADENOVIRUS, PCR Not Detected     Coronavirus 229E Not Detected     Coronavirus HKU1 Not Detected     Coronavirus NL63 Not Detected     Coronavirus OC43 Not Detected     COVID19 Not Detected     Human Metapneumovirus Not Detected     Human Rhinovirus/Enterovirus Not Detected     Influenza A PCR Not Detected     Influenza B PCR Not Detected     Parainfluenza Virus 1 Not Detected     Parainfluenza Virus 2 Not Detected     Parainfluenza Virus 3 Not Detected     Parainfluenza Virus 4 Not Detected     RSV, PCR Not Detected     Bordetella pertussis pcr Not Detected     Bordetella parapertussis PCR Not Detected     Chlamydophila pneumoniae PCR Not Detected     Mycoplasma pneumo by PCR Not Detected    Narrative:      In the setting of a positive respiratory panel with a viral infection PLUS a negative procalcitonin without other underlying concern for bacterial infection, consider observing off antibiotics or discontinuation of antibiotics and continue supportive care. If the respiratory panel is positive for atypical bacterial infection (Bordetella pertussis,  Chlamydophila pneumoniae, or Mycoplasma pneumoniae), consider antibiotic de-escalation to target atypical bacterial infection.    Blood Culture - Blood, Arm, Left [[id number]]  (Abnormal) Collected: 07/16/23 1001    Lab Status: Final result Specimen: Blood from Arm, Left Updated: 07/20/23 0530     Blood Culture Enterobacter cloacae complex     Isolated from Aerobic Bottle     Gram Stain Aerobic Bottle Gram negative bacilli     Comment: 2nd bottle positive          Narrative:      Refer to previous blood culture collected on 07/16/2023 1052 for MICs      Blood Culture - Blood, Arm, Left [[id number]]  (Abnormal)  (Susceptibility) Collected: 07/16/23 0952    Lab Status: Final result Specimen: Blood from Arm, Left Updated: 07/20/23 0518     Blood Culture Enterobacter cloacae complex     Comment:   This organism may develop resistance during prolonged therapy with 3rd generation cephalosporins as a result of de-repression of AmpC B-lactamase. Testing repeat isolates may be warranted if clinical status fails to improve.        Isolated from Anaerobic Bottle     Gram Stain Anaerobic Bottle Gram negative bacilli    Susceptibility        Enterobacter cloacae complex      MARTINA      Cefepime Susceptible      Ceftazidime Susceptible      Ceftriaxone Susceptible      Gentamicin Susceptible      Levofloxacin Susceptible      Piperacillin + Tazobactam Susceptible      Trimethoprim + Sulfamethoxazole Susceptible                       Susceptibility Comments       Enterobacter cloacae complex    Cefazolin sensitivity will not be reported for Enterobacteriaceae in non-urine isolates. If cefazolin is preferred, please call the microbiology lab to request an E-test.  This organism may develop resistance during prolonged therapy with 3rd-generation cephalosporins (e.g. ceftriaxone) as a result of de-repression of AmpC B-lactamase. Testing repeat isolates or an ID consult may be warranted if clinical status fails to improve.  With the  exception of urinary-sourced infections, aminoglycosides should not be used as monotherapy.               Blood Culture ID, PCR - Blood, Arm, Left [[id number]]  (Abnormal) Collected: 07/16/23 0952    Lab Status: Final result Specimen: Blood from Arm, Left Updated: 07/18/23 0451     BCID, PCR Enterobacter cloacae complex. Identification by BCID2 PCR.    Narrative:      No resistance genes detected.              Diagnosis:   (1) Neutropenia   (2) Anemia   (3) Thrombocytopenia   (4) Heparin-induced thrombocytopenia  (5) Bilateral PE   (6) Bilateral DVT   (7) Sepsis        Assessment & Plan     (1) Neutropenia     Neutropenia was likely secondary to sepsis/antibiotic related.  This is improved.  Continue to monitor.    No role for growth factor support.      (2) Anemia     suspect anemia related to anemia of inflammation.  Iron studies are normal.  B12 and folic acid is normal.  Improving.  Continue to monitor.      (3) Thrombocytopenia     This is improved.  Continue to monitor.    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  He is currently on Eliquis.  Please avoid all heparin products.  Continue Eliquis on discharge.  No bleeding concerns.    (7) Sepsis     Patient with Enterobacter bacteremia.  On IV antibiotic.  Per primary team.          Okay to discharge from hematology standpoint with antibiotic per primary team.  Outpatient follow-up with hematology in a month.    7/21/2023      CC:

## 2023-07-22 VITALS
TEMPERATURE: 97 F | SYSTOLIC BLOOD PRESSURE: 125 MMHG | WEIGHT: 315 LBS | HEIGHT: 76 IN | DIASTOLIC BLOOD PRESSURE: 83 MMHG | RESPIRATION RATE: 18 BRPM | OXYGEN SATURATION: 95 % | HEART RATE: 95 BPM | BODY MASS INDEX: 38.36 KG/M2

## 2023-07-22 PROBLEM — I50.22 CHRONIC SYSTOLIC HEART FAILURE: Status: ACTIVE | Noted: 2023-07-22

## 2023-07-22 LAB
ANION GAP SERPL CALCULATED.3IONS-SCNC: 12 MMOL/L (ref 5–15)
BASOPHILS # BLD AUTO: 0.08 10*3/MM3 (ref 0–0.2)
BASOPHILS NFR BLD AUTO: 1.6 % (ref 0–1.5)
BUN SERPL-MCNC: 15 MG/DL (ref 8–23)
BUN/CREAT SERPL: 15 (ref 7–25)
CALCIUM SPEC-SCNC: 9.3 MG/DL (ref 8.6–10.5)
CHLORIDE SERPL-SCNC: 100 MMOL/L (ref 98–107)
CO2 SERPL-SCNC: 26 MMOL/L (ref 22–29)
CREAT SERPL-MCNC: 1 MG/DL (ref 0.76–1.27)
DEPRECATED RDW RBC AUTO: 50.7 FL (ref 37–54)
EGFRCR SERPLBLD CKD-EPI 2021: 85.6 ML/MIN/1.73
EOSINOPHIL # BLD AUTO: 0.13 10*3/MM3 (ref 0–0.4)
EOSINOPHIL NFR BLD AUTO: 2.7 % (ref 0.3–6.2)
ERYTHROCYTE [DISTWIDTH] IN BLOOD BY AUTOMATED COUNT: 16.5 % (ref 12.3–15.4)
GLUCOSE SERPL-MCNC: 149 MG/DL (ref 65–99)
HCT VFR BLD AUTO: 37 % (ref 37.5–51)
HGB BLD-MCNC: 12.3 G/DL (ref 13–17.7)
IMM GRANULOCYTES # BLD AUTO: 0.09 10*3/MM3 (ref 0–0.05)
IMM GRANULOCYTES NFR BLD AUTO: 1.8 % (ref 0–0.5)
LYMPHOCYTES # BLD AUTO: 1.64 10*3/MM3 (ref 0.7–3.1)
LYMPHOCYTES NFR BLD AUTO: 33.5 % (ref 19.6–45.3)
MCH RBC QN AUTO: 28.1 PG (ref 26.6–33)
MCHC RBC AUTO-ENTMCNC: 33.2 G/DL (ref 31.5–35.7)
MCV RBC AUTO: 84.7 FL (ref 79–97)
MONOCYTES # BLD AUTO: 0.55 10*3/MM3 (ref 0.1–0.9)
MONOCYTES NFR BLD AUTO: 11.2 % (ref 5–12)
NEUTROPHILS NFR BLD AUTO: 2.4 10*3/MM3 (ref 1.7–7)
NEUTROPHILS NFR BLD AUTO: 49.2 % (ref 42.7–76)
NRBC BLD AUTO-RTO: 0 /100 WBC (ref 0–0.2)
PLATELET # BLD AUTO: 192 10*3/MM3 (ref 140–450)
PMV BLD AUTO: 10 FL (ref 6–12)
POTASSIUM SERPL-SCNC: 4.4 MMOL/L (ref 3.5–5.2)
RBC # BLD AUTO: 4.37 10*6/MM3 (ref 4.14–5.8)
SODIUM SERPL-SCNC: 138 MMOL/L (ref 136–145)
WBC NRBC COR # BLD: 4.89 10*3/MM3 (ref 3.4–10.8)

## 2023-07-22 PROCEDURE — 85025 COMPLETE CBC W/AUTO DIFF WBC: CPT | Performed by: PHYSICIAN ASSISTANT

## 2023-07-22 PROCEDURE — 80048 BASIC METABOLIC PNL TOTAL CA: CPT | Performed by: PHYSICIAN ASSISTANT

## 2023-07-22 RX ORDER — FOLIC ACID 1 MG/1
5 TABLET ORAL DAILY
Qty: 30 TABLET | Refills: 0 | Status: SHIPPED | OUTPATIENT
Start: 2023-07-23 | End: 2023-07-29

## 2023-07-22 RX ADMIN — METOPROLOL SUCCINATE 25 MG: 25 TABLET, FILM COATED, EXTENDED RELEASE ORAL at 08:18

## 2023-07-22 RX ADMIN — Medication 10 ML: at 08:19

## 2023-07-22 RX ADMIN — FLUTICASONE PROPIONATE 2 SPRAY: 50 SPRAY, METERED NASAL at 08:20

## 2023-07-22 RX ADMIN — ACETAMINOPHEN 650 MG: 325 TABLET, FILM COATED ORAL at 08:27

## 2023-07-22 RX ADMIN — Medication 400 MG: at 08:18

## 2023-07-22 RX ADMIN — APIXABAN 5 MG: 5 TABLET, FILM COATED ORAL at 08:18

## 2023-07-22 RX ADMIN — DOCUSATE SODIUM 50 MG AND SENNOSIDES 8.6 MG 2 TABLET: 8.6; 5 TABLET, FILM COATED ORAL at 08:18

## 2023-07-22 RX ADMIN — FOLIC ACID 5 MG: 1 TABLET ORAL at 08:18

## 2023-07-22 NOTE — DISCHARGE SUMMARY
[facility] Medicine Services  DISCHARGE SUMMARY       Date of Admission: 7/16/2023  Date of Discharge:  7/22/2023  Primary Care Physician: Jamaal Bravo MD    Presenting Problem/History of Present Illness:  Fever of unknown origin [R50.9]  Fever, unknown origin [R50.9]  Sepsis [A41.9]  Sepsis due to other etiology [A41.89]       Final Discharge Diagnoses:  Active Hospital Problems    Diagnosis     **Septicemia due to Enterobacter     Chronic systolic heart failure     Sepsis     Fever, unknown origin        Consults:   Consults       Date and Time Order Name Status Description    7/19/2023  9:05 AM Hematology & Oncology Inpatient Consult Completed     7/17/2023  8:56 AM Inpatient Podiatry Consult Completed             Procedures Performed:                 Pertinent Test Results:   Lab Results (most recent)       Procedure Component Value Units Date/Time    Basic Metabolic Panel [[id number]]  (Abnormal) Collected: 07/22/23 0606    Specimen: Blood Updated: 07/22/23 0700     Glucose 149 mg/dL      BUN 15 mg/dL      Creatinine 1.00 mg/dL      Sodium 138 mmol/L      Potassium 4.4 mmol/L      Chloride 100 mmol/L      CO2 26.0 mmol/L      Calcium 9.3 mg/dL      BUN/Creatinine Ratio 15.0     Anion Gap 12.0 mmol/L      eGFR 85.6 mL/min/1.73     Narrative:      GFR Normal >60  Chronic Kidney Disease <60  Kidney Failure <15      CBC & Differential [[id number]]  (Abnormal) Collected: 07/22/23 0606    Specimen: Blood Updated: 07/22/23 0642    Narrative:      The following orders were created for panel order CBC & Differential.  Procedure                               Abnormality         Status                     ---------                               -----------         ------                     CBC Auto Differential[[id number]]        Abnormal            Final result                 Please view results for these tests on the individual orders.    CBC Auto Differential  [[id number]]  (Abnormal) Collected: 07/22/23 0606    Specimen: Blood Updated: 07/22/23 0642     WBC 4.89 10*3/mm3      RBC 4.37 10*6/mm3      Hemoglobin 12.3 g/dL      Hematocrit 37.0 %      MCV 84.7 fL      MCH 28.1 pg      MCHC 33.2 g/dL      RDW 16.5 %      RDW-SD 50.7 fl      MPV 10.0 fL      Platelets 192 10*3/mm3      Neutrophil % 49.2 %      Lymphocyte % 33.5 %      Monocyte % 11.2 %      Eosinophil % 2.7 %      Basophil % 1.6 %      Immature Grans % 1.8 %      Neutrophils, Absolute 2.40 10*3/mm3      Lymphocytes, Absolute 1.64 10*3/mm3      Monocytes, Absolute 0.55 10*3/mm3      Eosinophils, Absolute 0.13 10*3/mm3      Basophils, Absolute 0.08 10*3/mm3      Immature Grans, Absolute 0.09 10*3/mm3      nRBC 0.0 /100 WBC     POC Glucose Once [[id number]]  (Abnormal) Collected: 07/21/23 1554    Specimen: Blood Updated: 07/21/23 1612     Glucose 184 mg/dL      Comment: RN NotifiedOperator: [id number] [employer] ID: [id number]       POC Glucose Once [[id number]]  (Abnormal) Collected: 07/21/23 1009    Specimen: Blood Updated: 07/21/23 1024     Glucose 220 mg/dL      Comment: RN NotifiedOperator: [id number] [employer] ID: [id number]       Blood Culture - Blood, Arm, Right [[id number]]  (Normal) Collected: 07/19/23 0947    Specimen: Blood from Arm, Right Updated: 07/21/23 1000     Blood Culture No growth at 2 days    Blood Culture - Blood, Hand, Right [[id number]]  (Normal) Collected: 07/19/23 0947    Specimen: Blood from Hand, Right Updated: 07/21/23 1000     Blood Culture No growth at 2 days    Basic Metabolic Panel [[id number]]  (Abnormal) Collected: 07/21/23 0610    Specimen: Blood Updated: 07/21/23 0711     Glucose 142 mg/dL      BUN 12 mg/dL      Creatinine 0.76 mg/dL      Sodium 136 mmol/L      Potassium 3.9 mmol/L      Chloride 99 mmol/L      CO2 26.0 mmol/L      Calcium 9.1 mg/dL      BUN/Creatinine Ratio 15.8     Anion Gap 11.0 mmol/L      eGFR 102.3 mL/min/1.73     Narrative:      GFR  Normal >60  Chronic Kidney Disease <60  Kidney Failure <15      CBC & Differential [[id number]]  (Abnormal) Collected: 07/21/23 0610    Specimen: Blood Updated: 07/21/23 0648    Narrative:      The following orders were created for panel order CBC & Differential.  Procedure                               Abnormality         Status                     ---------                               -----------         ------                     CBC Auto Differential[[id number]]        Abnormal            Final result                 Please view results for these tests on the individual orders.    CBC Auto Differential [[id number]]  (Abnormal) Collected: 07/21/23 0610    Specimen: Blood Updated: 07/21/23 0648     WBC 4.04 10*3/mm3      RBC 4.36 10*6/mm3      Hemoglobin 11.9 g/dL      Hematocrit 36.5 %      MCV 83.7 fL      MCH 27.3 pg      MCHC 32.6 g/dL      RDW 16.2 %      RDW-SD 49.5 fl      MPV 10.1 fL      Platelets 160 10*3/mm3      Neutrophil % 49.3 %      Lymphocyte % 35.9 %      Monocyte % 10.1 %      Eosinophil % 3.5 %      Basophil % 0.5 %      Immature Grans % 0.7 %      Neutrophils, Absolute 1.99 10*3/mm3      Lymphocytes, Absolute 1.45 10*3/mm3      Monocytes, Absolute 0.41 10*3/mm3      Eosinophils, Absolute 0.14 10*3/mm3      Basophils, Absolute 0.02 10*3/mm3      Immature Grans, Absolute 0.03 10*3/mm3      nRBC 0.0 /100 WBC     Peripheral Blood Smear [[id number]] Collected: 07/20/23 0540    Specimen: Blood Updated: 07/20/23 1625     Performed by: Bonifacio Moore M.D.     Pathologist Interpretation --     Red cells are normocytic and normochromic, with rare teardrop cells present.  The white count is borderline decreased, and my differential count follows: Neutrophils 37%, bands 1%, lymphocytes 50%, monocytes 5%, basophils 1%, eosinophils 6%.  Platelets are decreased in number, and are normal in size and granulation.  No morphologic evidence of either myelodysplastic syndrome or myeloproliferative neoplasm is  identified.    Impression:  Neutropenia, moderate  Anemia, borderline, normocytic/normochromic  Thrombocytopenia, mild    Folate [[id number]]  (Normal) Collected: 07/20/23 0540    Specimen: Blood Updated: 07/20/23 1239     Folate >20.00 ng/mL     Narrative:      Results may be falsely increased if patient taking Biotin.      Vitamin B12 [[id number]]  (Normal) Collected: 07/20/23 0540    Specimen: Blood Updated: 07/20/23 1239     Vitamin B-12 390 pg/mL     Narrative:      Results may be falsely increased if patient taking Biotin.      Urinalysis With Culture If Indicated - Urine, Clean Catch [[id number]]  (Abnormal) Collected: 07/20/23 0558    Specimen: Urine, Clean Catch Updated: 07/20/23 0702     Color, UA Yellow     Appearance, UA Cloudy     pH, UA 6.0     Specific Gravity, UA 1.015     Glucose, UA Negative     Ketones, UA Negative     Bilirubin, UA Negative     Blood, UA Negative     Protein, UA Negative     Leuk Esterase, UA Negative     Nitrite, UA Negative     Urobilinogen, UA 0.2 E.U./dL    Narrative:      In absence of clinical symptoms, the presence of pyuria, bacteria, and/or nitrites on the urinalysis result does not correlate with infection.  Urine microscopic not indicated.    Ferritin [[id number]]  (Normal) Collected: 07/20/23 0540    Specimen: Blood Updated: 07/20/23 0623     Ferritin 366.50 ng/mL     Narrative:      Results may be falsely decreased if patient taking Biotin.      Iron Profile [[id number]]  (Normal) Collected: 07/20/23 0540    Specimen: Blood Updated: 07/20/23 0621     Iron 72 mcg/dL      Iron Saturation (TSAT) 21 %      Transferrin 225 mg/dL      TIBC 335 mcg/dL     Vitamin B12 [[id number]]  (Normal) Collected: 07/19/23 0947    Specimen: Blood Updated: 07/19/23 1938     Vitamin B-12 347 pg/mL     Narrative:      Results may be falsely increased if patient taking Biotin.      Urinalysis With Microscopic - Urine, Clean Catch [[id number]]  (Abnormal) Collected: 07/18/23 1730    Specimen: Urine,  Clean Catch Updated: 07/18/23 1757    Narrative:      The following orders were created for panel order Urinalysis With Microscopic - Urine, Clean Catch.  Procedure                               Abnormality         Status                     ---------                               -----------         ------                     Urinalysis without micro...[[id number]]  Abnormal            Final result               Urinalysis, Microscopic ...[[id number]]  Abnormal            Final result                 Please view results for these tests on the individual orders.    Urinalysis, Microscopic Only - Urine, Clean Catch [[id number]]  (Abnormal) Collected: 07/18/23 1730    Specimen: Urine, Clean Catch Updated: 07/18/23 1757     RBC, UA None Seen /HPF      WBC, UA 3-5 /HPF      Bacteria, UA 1+ /HPF      Squamous Epithelial Cells, UA 0-2 /HPF      Hyaline Casts, UA None Seen /LPF      Granular Casts, UA 3-6 /LPF      Methodology Automated Microscopy    Urinalysis without microscopic (no culture) - Urine, Clean Catch [[id number]]  (Abnormal) Collected: 07/18/23 1730    Specimen: Urine, Clean Catch Updated: 07/18/23 1742     Color, UA Yellow     Appearance, UA Clear     pH, UA 5.5     Specific Gravity, UA 1.016     Glucose, UA Negative     Ketones, UA Negative     Bilirubin, UA Negative     Blood, UA Negative     Protein, UA Trace     Leuk Esterase, UA Negative     Nitrite, UA Negative     Urobilinogen, UA 0.2 E.U./dL    Blood Culture ID, PCR - Blood, Arm, Left [[id number]]  (Abnormal) Collected: 07/16/23 0952    Specimen: Blood from Arm, Left Updated: 07/18/23 0451     BCID, PCR Enterobacter cloacae complex. Identification by BCID2 PCR.    Narrative:      No resistance genes detected.    Manual Differential [[id number]]  (Abnormal) Collected: 07/18/23 0318    Specimen: Blood Updated: 07/18/23 0440     Neutrophil % 56.0 %      Lymphocyte % 27.0 %      Monocyte % 4.0 %      Eosinophil % 2.0 %      Bands %  10.0 %      Atypical  Lymphocyte % 1.0 %      Neutrophils Absolute 1.97 10*3/mm3      Lymphocytes Absolute 0.84 10*3/mm3      Monocytes Absolute 0.12 10*3/mm3      Eosinophils Absolute 0.06 10*3/mm3      Anisocytosis Slight/1+     WBC Morphology Normal     Platelet Estimate Decreased    Vancomycin, Peak [[id number]]  (Normal) Collected: 07/18/23 0317    Specimen: Blood Updated: 07/18/23 0351     Vancomycin Peak 30.10 mcg/mL     Sedimentation Rate [[id number]]  (Abnormal) Collected: 07/17/23 0535    Specimen: Blood Updated: 07/17/23 1030     Sed Rate 23 mm/hr     Manual Differential [[id number]] Collected: 07/17/23 0535    Specimen: Blood Updated: 07/17/23 0802     Neutrophil % 71.0 %      Lymphocyte % 22.0 %      Monocyte % 5.0 %      Basophil % 1.0 %      Bands %  1.0 %      Neutrophils Absolute 3.23 10*3/mm3      Lymphocytes Absolute 0.99 10*3/mm3      Monocytes Absolute 0.22 10*3/mm3      Basophils Absolute 0.04 10*3/mm3      Anisocytosis Slight/1+     WBC Morphology Normal     Platelet Estimate Decreased    STAT Lactic Acid, Reflex [[id number]]  (Normal) Collected: 07/17/23 0000    Specimen: Blood Updated: 07/17/23 0022     Lactate 1.0 mmol/L     STAT Lactic Acid, Reflex [[id number]]  (Abnormal) Collected: 07/16/23 1727    Specimen: Blood Updated: 07/16/23 1751     Lactate 2.3 mmol/L     TSH [[id number]]  (Normal) Collected: 07/16/23 0913    Specimen: Blood Updated: 07/16/23 1343     TSH 0.439 uIU/mL     C-reactive Protein [[id number]]  (Abnormal) Collected: 07/16/23 0913    Specimen: Blood Updated: 07/16/23 1336     C-Reactive Protein 11.00 mg/dL     Respiratory Panel PCR w/COVID-19(SARS-CoV-2) [facility]ICIA/SABI/ALFA/PAD/COR/MAD/RUBI In-House, NP Swab in [facility], 3-4 HR TAT - Swab, Nasopharynx [[id number]]  (Normal) Collected: 07/16/23 1035    Specimen: Swab from Nasopharynx Updated: 07/16/23 1135     ADENOVIRUS, PCR Not Detected     Coronavirus 229E Not Detected     Coronavirus HKU1 Not Detected     Coronavirus NL63 Not Detected     Coronavirus OC43  Not Detected     COVID19 Not Detected     Human Metapneumovirus Not Detected     Human Rhinovirus/Enterovirus Not Detected     Influenza A PCR Not Detected     Influenza B PCR Not Detected     Parainfluenza Virus 1 Not Detected     Parainfluenza Virus 2 Not Detected     Parainfluenza Virus 3 Not Detected     Parainfluenza Virus 4 Not Detected     RSV, PCR Not Detected     Bordetella pertussis pcr Not Detected     Bordetella parapertussis PCR Not Detected     Chlamydophila pneumoniae PCR Not Detected     Mycoplasma pneumo by PCR Not Detected    Narrative:      In the setting of a positive respiratory panel with a viral infection PLUS a negative procalcitonin without other underlying concern for bacterial infection, consider observing off antibiotics or discontinuation of antibiotics and continue supportive care. If the respiratory panel is positive for atypical bacterial infection (Bordetella pertussis, Chlamydophila pneumoniae, or Mycoplasma pneumoniae), consider antibiotic de-escalation to target atypical bacterial infection.    Urinalysis, Microscopic Only - Straight Cath [[id number]]  (Abnormal) Collected: 07/16/23 1007    Specimen: Urine from Straight Cath Updated: 07/16/23 1053     RBC, UA 0-2 /HPF      WBC, UA 0-2 /HPF      Bacteria, UA None Seen /HPF      Squamous Epithelial Cells, UA 0-2 /HPF      Hyaline Casts, UA 0-2 /LPF      Methodology Manual Light Microscopy    Fentanyl, Urine - Straight Cath [[id number]]  (Normal) Collected: 07/16/23 1007    Specimen: Urine from Straight Cath Updated: 07/16/23 1032     Fentanyl, Urine Negative    Narrative:      Negative Threshold:      Fentanyl 5 ng/mL     The normal value for the drug tested is negative. This report includes final unconfirmed screening results to be used for medical treatment purposes only. Unconfirmed results must not be used for non-medical purposes such as employment or legal testing. Clinical consideration should be applied to any drug of abuse  test, particularly when unconfirmed results are used.           Urinalysis With Microscopic If Indicated (No Culture) - Straight Cath [[id number]]  (Abnormal) Collected: 07/16/23 1007    Specimen: Urine from Straight Cath Updated: 07/16/23 1028     Color, UA Yellow     Appearance, UA Clear     pH, UA <=5.0     Specific Gravity, UA 1.027     Glucose, UA Negative     Ketones, UA Trace     Bilirubin, UA Negative     Blood, UA Negative     Protein, UA 30 mg/dL (1+)     Leuk Esterase, UA Negative     Nitrite, UA Negative     Urobilinogen, UA 1.0 E.U./dL    Urine Drug Screen - Straight Cath [[id number]]  (Normal) Collected: 07/16/23 1007    Specimen: Urine from Straight Cath Updated: 07/16/23 1026     THC, Screen, Urine Negative     Phencyclidine (PCP), Urine Negative     Cocaine Screen, Urine Negative     Methamphetamine, Ur Negative     Opiate Screen Negative     Amphetamine Screen, Urine Negative     Benzodiazepine Screen, Urine Negative     Tricyclic Antidepressants Screen Negative     Methadone Screen, Urine Negative     Barbiturates Screen, Urine Negative     Oxycodone Screen, Urine Negative     Propoxyphene Screen Negative     Buprenorphine, Screen, Urine Negative    Narrative:      Cutoff For Drugs Screened:    Amphetamines               500 ng/ml  Barbiturates               200 ng/ml  Benzodiazepines            150 ng/ml  Cocaine                    150 ng/ml  Methadone                  200 ng/ml  Opiates                    100 ng/ml  Phencyclidine               25 ng/ml  THC                            50 ng/ml  Methamphetamine            500 ng/ml  Tricyclic Antidepressants  300 ng/ml  Oxycodone                  100 ng/ml  Propoxyphene               300 ng/ml  Buprenorphine               10 ng/ml    The normal value for all drugs tested is negative. This report includes unconfirmed screening results, with the cutoff values listed, to be used for medical treatment purposes only.  Unconfirmed results must not be used  for non-medical purposes such as employment or legal testing.  Clinical consideration should be applied to any drug of abuse test, particularly when unconfirmed results are used.      [place] Draw [[id number]] Collected: 07/16/23 0913    Specimen: Blood Updated: 07/16/23 1017    Narrative:      The following orders were created for panel order [place] Draw.  Procedure                               Abnormality         Status                     ---------                               -----------         ------                     Green Top (Gel)[[id number]]                                  Final result               Lavender Top[[id number]]                                     Final result               Gold Top - SST[[id number]]                                   Final result               Light Blue Top[[id number]]                                   Final result                 Please view results for these tests on the individual orders.    Lavender Top [[id number]] Collected: 07/16/23 0913    Specimen: Blood Updated: 07/16/23 1017     Extra Tube hold for add-on     Comment: Auto resulted       Gold Top - SST [[id number]] Collected: 07/16/23 0913    Specimen: Blood Updated: 07/16/23 1017     Extra Tube Hold for add-ons.     Comment: Auto resulted.       Light Blue Top [[id number]] Collected: 07/16/23 0913    Specimen: Blood Updated: 07/16/23 1017     Extra Tube Hold for add-ons.     Comment: Auto resulted       Green Top (Gel) [[id number]] Collected: 07/16/23 0913    Specimen: Blood Updated: 07/16/23 1017     Extra Tube Hold for add-ons.     Comment: Auto resulted.       Comprehensive Metabolic Panel [[id number]]  (Abnormal) Collected: 07/16/23 0913    Specimen: Blood Updated: 07/16/23 0945     Glucose 242 mg/dL      BUN 28 mg/dL      Creatinine 1.16 mg/dL      Sodium 133 mmol/L      Potassium 4.1 mmol/L      Chloride 96 mmol/L      CO2 20.0 mmol/L      Calcium 8.5 mg/dL      Total Protein 7.2 g/dL      Albumin 3.8 g/dL       ALT (SGPT) 23 U/L      AST (SGOT) 17 U/L      Alkaline Phosphatase 106 U/L      Total Bilirubin 1.3 mg/dL      Globulin 3.4 gm/dL      A/G Ratio 1.1 g/dL      BUN/Creatinine Ratio 24.1     Anion Gap 17.0 mmol/L      eGFR 71.7 mL/min/1.73     Narrative:      GFR Normal >60  Chronic Kidney Disease <60  Kidney Failure <15      Ethanol [[id number]] Collected: 07/16/23 0913    Specimen: Blood Updated: 07/16/23 0945     Ethanol <10 mg/dL      Ethanol % <0.010 %     Single High Sensitivity Troponin T [[id number]]  (Abnormal) Collected: 07/16/23 0913    Specimen: Blood Updated: 07/16/23 0944     HS Troponin T 30 ng/L     Narrative:      High Sensitive Troponin T Reference Range:  <10.0 ng/L- Negative Female for AMI  <15.0 ng/L- Negative Male for AMI  >=10 - Abnormal Female indicating possible myocardial injury.  >=15 - Abnormal Male indicating possible myocardial injury.   Clinicians would have to utilize clinical acumen, EKG, Troponin, and serial changes to determine if it is an Acute Myocardial Infarction or myocardial injury due to an underlying chronic condition.         Lactic Acid, Plasma [[id number]]  (Abnormal) Collected: 07/16/23 0913    Specimen: Blood Updated: 07/16/23 0943     Lactate 4.7 mmol/L     Protime-INR [[id number]]  (Abnormal) Collected: 07/16/23 0913    Specimen: Blood Updated: 07/16/23 0939     Protime 17.6 Seconds      INR 1.46    Narrative:      Therapeutic range for most indications is 2.0-3.0 INR,  or 2.5-3.5 for mechanical heart valves.          Imaging Results (Most Recent)       Procedure Component Value Units Date/Time    US Guided Vascular Access [[id number]] Collected: 07/21/23 1502     Updated: 07/21/23 1506    Narrative:      The right  arm was utilized for vascular access.  Images demonstrate patency of  the basilic.  The vessel was accessed under direct ultrasound guidance.  Permanent images were saved in the chart/PACS system.    IR Insert Midline Without Port Pump 5 Plus [[id number]]  Resulted: 07/21/23 1353     Updated: 07/21/23 1353    Narrative:      This procedure was auto-finalized with no dictation required.    CT Lower Extremity Left With Contrast [[id number]] Collected: 07/19/23 1305     Updated: 07/19/23 1648    Narrative:      INDICATION:  Soft tissue mass, ankle, vascular mass suspected.    COMPARISON:  None relevant.    TECHNIQUE:  Helical CT centered about the left ankle was performed with intravenous  contrast.  Multiplanar reformations were provided.    FINDINGS:  Considerable bony destruction involves the talocrural, subtalar, and midfoot  articulations with essential complete obliteration and fragmentation of the  talus.  Arthrodesis nail and screw transfix the tibia and calcaneus with  additional screw transfixing calcaneocuboid joint, neither area with significant  fusion.  Considerable soft tissue edema and stranding and bony fragments are  seen throughout the soft tissues.  No evidence of hardware loosening.    Prior amputation of the distal aspect of the fibula.  No acute fracture or  dislocation is suspected.      Impression:      Considerable bony destruction of the ankle and midfoot likely consistent with  severe Charcot arthropathy.    No soft tissue neoplasm is suspected.        XR Foot 3+ View Left [[id number]] Collected: 07/17/23 1044     Updated: 07/17/23 1527    Narrative:      HISTORY:  Foot pain.    VIEWS:  Frontal, lateral and oblique images of the left foot were obtained.    FINDINGS:  There is marked fragmentation of the tarsal bones which is similar to the  patient's prior study.  There is complete collapse of the talus.  Multiple bone  fragments are noted about the calcaneus and midfoot.  There are advanced  degenerative changes involving the metatarsophalangeal joints.  There are  advanced degenerative changes involving the metatarsotarsal joints.    There is a new deformity of the base of the proximal phalanx of the fifth digit.  This is most likely secondary  to a fracture.      Impression:      1.  Advanced Charcot arthropathy of the left foot which is similar to the  patient's prior study.    2.  Probable fracture involving the base of the proximal phalanx of the fifth  digit.    XR Ankle 3+ View Left [[id number]] Collected: 07/17/23 1206     Updated: 07/17/23 1210    Narrative:      INDICATION:  charcot, joint edema.    COMPARISON:  6/5/2023..    FINDINGS:  Prior TTC fusion with grossly intact hardware.  Severe degenerative changes with  disorganization and fragmentation of both the midfoot and hindfoot compatible  with Charcot arthropathy.  Overall appearance is similar compared to prior  examination.  Prior resection of the distal fibula.  Diffuse soft tissue  swelling.  No evidence of active erosive change.      XR Chest 1 View [[id number]] Collected: 07/16/23 0922     Updated: 07/16/23 1316    Narrative:      XR CHEST, 1 VIEW    HISTORY:  Chills.    FINDINGS:  No pneumothorax.  No pleural effusion.  No focal airspace opacities.  Implantable cardiac device with associated leads in similar position to prior.  The cardiomediastinal silhouette and upper abdomen are unremarkable.  No acute  osseous abnormality.      CT Head Without Contrast [[id number]] Collected: 07/16/23 0940     Updated: 07/16/23 0945    Narrative:      CT HEAD WO CONTRAST    HISTORY: Confusion/delirium, altered LOC, unexplained    COMPARISON: CT 6/2/2023    Automated exposure control was also utilized to decrease patient radiation dose.    TECHNIQUE: Helical tomographic images of the brain were obtained without the use  of intravenous contrast.    FINDINGS:  Gray-white matter differentiation is maintained.    Normal appearance of midline structures.  No midline shift.  Symmetric  appearance of the hemispheres.  Basal cisterns are intact.  Fourth ventricle is  intact.  Lateral ventricles are unremarkable.      No evidence of intraparenchymal hemorrhage or abnormal extra-axial fluid  collections.  Sulci  are normal in appearance.  Cerebellum is symmetric and  normal in appearance.  Brainstem is unremarkable.    Bones: No acute osseous abnormality.    Soft tissues: Unremarkable.    Sinuses and mastoid air cells: Unremarkable.      Impression:      1. No acute intracranial process.                Chief Complaint on Day of Discharge: None    Hospital Course:  The patient is a 63 y.o. male who presented to [facility] with fevers, chills.  He was found to have sinus tachycardia, lactic acidosis, elevated CRP, sepsis.  Pertinent recent history includes May 2023 admission during which he was found to have chronic left foot ulcer and osteomyelitis of left foot, Charcot joint, status post removal of external fixator with tibial talocalcaneal arthrodesis, recent admission for sepsis, fever, Enterobacter bacteremia.  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    Patient was admitted for empiric treatment with broad-spectrum antibiotics, fluid resuscitation for sepsis.  X-rays did show soft tissue swelling but no overt clear evidence of superficial soft tissue infection.  Blood cultures did show Enterobacter cloacae bacteremia, given history of chronic osteomyelitis this was the presumed source.  He was initially placed on vancomycin and cefepime but developed pancytopenia so antibiotics were changed to Merrem.  The pancytopenia did resolve.  CT showed only Charcot foot, unable to do MRI due to pacemaker incompatibility.  He did improve.  He will be discharged with outpatient Invanz x8 more days for total of 14 days of antibiotics.  He will follow closely with oncology, referral to infectious disease.    Condition on Discharge: Stable, improved    Physical Exam on Discharge:  /83 (BP Location: Left arm, Patient Position: Sitting)    "Pulse 95   Temp 97 °F (36.1 °C) (Temporal)   Resp 18   Ht 193 cm (75.98\")   Wt (!) 146 kg (320 lb 14.4 oz)   SpO2 95%   BMI 39.08 kg/m²     Vitals and nursing note reviewed.   Constitutional:       General: No acute distress.     Appearance: Normal appearance.   HENT:      Head: Normocephalic and atraumatic.      Right Ear: External ear normal.      Left Ear: External ear normal.      Nose: Nose normal.      Mouth/Throat:      Mouth: Mucous membranes are moist.   Eyes:      Conjunctivae/sclerae: Conjunctivae normal.      Pupils: Pupils are equal, round, and reactive to light.   Cardiovascular:      Rate and Rhythm: Normal rate and regular rhythm.   Pulmonary:      Effort: Pulmonary effort is normal.      Breath sounds: Normal breath sounds.   Abdominal:      General: Abdomen is flat.      Palpations: Abdomen is soft.      Tenderness: There is no abdominal tenderness.   Genitourinary:   Musculoskeletal:    Left foot in boot     General: No signs of injury. Normal range of motion.      Cervical back: No tenderness.   Skin:     General: Skin is warm and dry.   Neurological:      General: No focal deficit present.      Mental Status: Alert and oriented to person, place, and time.   Psychiatric:         Mood and Affect: Mood normal.         Behavior: Behavior normal.         Discharge Medications:     Discharge Medications        New Medications        Instructions Start Date   ertapenem 1,000 mg in sodium chloride 0.9 % 50 mL IVPB   1,000 mg, Intravenous, Every 24 Hours      folic acid 1 MG tablet  Commonly known as: FOLVITE   5 mg, Oral, Daily   Start Date: July 23, 2023     insulin detemir 100 UNIT/ML injection  Commonly known as: LEVEMIR   10 Units, Subcutaneous, Nightly             Continue These Medications        Instructions Start Date   acetaminophen 325 MG tablet  Commonly known as: TYLENOL   650 mg, Oral, Every 6 Hours PRN      apixaban 5 MG tablet tablet  Commonly known as: ELIQUIS   5 mg, Oral, Every " 12 Hours Scheduled      ascorbic acid 1000 MG tablet  Commonly known as: VITAMIN C   1,000 mg, Oral, Nightly      Cholecalciferol 125 MCG (5000 UT) tablet   5,000 Units, Oral, Nightly      dronedarone 400 MG tablet  Commonly known as: MULTAQ   400 mg, Oral, Nightly      fexofenadine 180 MG tablet  Commonly known as: ALLEGRA   1 tablet, Oral, Nightly      fluticasone 50 MCG/ACT nasal spray  Commonly known as: FLONASE   2 sprays, Nasal, As Needed      furosemide 20 MG tablet  Commonly known as: Lasix   20 mg, Oral, Daily      glimepiride 4 MG tablet  Commonly known as: AMARYL   4 mg, Oral, Nightly      magnesium oxide 400 MG tablet  Commonly known as: MAG-OX   400 mg, Oral, 2 Times Daily      metFORMIN 1000 MG tablet  Commonly known as: GLUCOPHAGE   1,000 mg, Oral, 2 Times Daily With Meals      metoprolol succinate XL 25 MG 24 hr tablet  Commonly known as: TOPROL-XL   25 mg, Oral, 2 Times Daily, 0.5 tablet       multivitamin tablet tablet   Oral, Nightly      ondansetron 4 MG tablet  Commonly known as: ZOFRAN   4 mg, Oral, Every 6 Hours PRN      ONE TOUCH ULTRA 2 w/Device kit   No dose, route, or frequency recorded.      ONE TOUCH ULTRA TEST test strip  Generic drug: glucose blood   USE TO TEST BLOOD SUGAR THREE TIMES A DAY      Probiotic 1-250 BILLION-MG capsule   1 capsule, Oral, 2 Times Daily      Zinc 50 MG tablet   1 tablet, Oral, Nightly             Stop These Medications      levoFLOXacin 500 MG tablet  Commonly known as: LEVAQUIN              Discharge Diet:   Diet Instructions       Diet: Cardiac Diets, Diabetic Diets; No Salt Packet; Regular Texture (IDDSI 7); Thin (IDDSI 0); Consistent Carbohydrate      Discharge Diet:  Cardiac Diets  Diabetic Diets       Cardiac Diet: No Salt Packet    Texture: Regular Texture (IDDSI 7)    Fluid Consistency: Thin (IDDSI 0)    Diabetic Diet: Consistent Carbohydrate             Activity at Discharge:   Activity Instructions       Activity as Tolerated               Discharge  Care Plan/Instructions:     Sepsis, most likely infectious source recurrent chronic osteomyelitis, markedly elevated CRP, other possibility could be septic joint or infected hardware, however hardware appears intact on x-rays. Enterobacter cloacae complex, pending susceptibility, on cefepime.  ---In any event, it would require weeks of IV antibiotics, would lean towards completing 6 weeks of IV antibiotics for more definitive treatment of likely recurrent osteomyelitis  --Recurrent Enterobacter bacteremia without source control  --Repeat blood culture to ensure clearance  - Unable to obtain MRI as unable to verify device compatibility  --Follow fever curve  --Lactic acidosis improved with fluids  --Podiatry do not consider intervention  -- Continue skin care     Chronic HFrEF, EF 30 to 35% on echo in June, AICD present  Cautious with any further fluid resuscitation     A-fib  Monitor telemetry  Rate control on metoprolol twice daily  Anticoagulated on Eliquis     Progressive pancytopenia, bone marrow toxicity because of antibiotics, start folic acid, check b12 levels, discontinue cefepime (>10% hematologic reactions and start iv meropenem, consult hematology.     Type 2 diabetes, hyperglycemia because of sepsis, start basal insulin levemir and follow.     History of DVT/PE  Anticoagulated on Eliquis      Follow-up Appointments:   Future Appointments   Date Time Provider Department Center   7/22/2023 10:30 AM [facility] INFU CHAIR [facility]       Test Results Pending at Discharge:   Pending Labs       Order Current Status    Blood Culture - Blood, Arm, Right Preliminary result    Blood Culture - Blood, Hand, Right Preliminary result            The patient has current or prior documentation of LVEF less than 40%, or moderate to severely depressed left ventricular systolic function. The patent was prescribed or already taking an ACE inhibitor or ARB.     Copied text in this note has been reviewed and is accurate  as of 7/22/2023     Time: 33 minutes spent preparing discharge to include chart review, patient evaluation, medication reconciliation, discharge orders, and reviewing DC plan with CM, RN, patient and family to facilitate safe discharge.

## 2023-07-22 NOTE — SIGNIFICANT NOTE
07/22/23 0945   OTHER   Discipline physical therapy assistant   Rehab Time/Intention   Session Not Performed other (see comments)  (Nsg reports pt in process of being d/c and not available/appropriate for visit.)

## 2023-07-24 ENCOUNTER — INFUSION (OUTPATIENT)
Dept: ONCOLOGY | Facility: HOSPITAL | Age: 63
End: 2023-07-24
Payer: MEDICARE

## 2023-07-24 VITALS
DIASTOLIC BLOOD PRESSURE: 65 MMHG | TEMPERATURE: 96.2 F | SYSTOLIC BLOOD PRESSURE: 145 MMHG | RESPIRATION RATE: 18 BRPM | HEART RATE: 80 BPM

## 2023-07-24 DIAGNOSIS — M86.372 CHRONIC MULTIFOCAL OSTEOMYELITIS OF LEFT FOOT: Primary | ICD-10-CM

## 2023-07-24 LAB
BACTERIA SPEC AEROBE CULT: NORMAL
BACTERIA SPEC AEROBE CULT: NORMAL

## 2023-07-24 PROCEDURE — 96365 THER/PROPH/DIAG IV INF INIT: CPT | Performed by: NURSE PRACTITIONER

## 2023-07-24 PROCEDURE — 25010000002 ERTAPENEM PER 500 MG: Performed by: PHYSICIAN ASSISTANT

## 2023-07-24 RX ORDER — SODIUM CHLORIDE 9 MG/ML
250 INJECTION, SOLUTION INTRAVENOUS ONCE
Status: CANCELLED | OUTPATIENT
Start: 2023-07-25

## 2023-07-24 RX ADMIN — ERTAPENEM SODIUM 1000 MG: 1 INJECTION, POWDER, LYOPHILIZED, FOR SOLUTION INTRAMUSCULAR; INTRAVENOUS at 10:29

## 2023-07-25 ENCOUNTER — INFUSION (OUTPATIENT)
Dept: ONCOLOGY | Facility: HOSPITAL | Age: 63
End: 2023-07-25
Payer: MEDICARE

## 2023-07-25 ENCOUNTER — TELEPHONE (OUTPATIENT)
Dept: PODIATRY | Facility: CLINIC | Age: 63
End: 2023-07-25
Payer: MEDICARE

## 2023-07-25 VITALS
RESPIRATION RATE: 18 BRPM | DIASTOLIC BLOOD PRESSURE: 71 MMHG | SYSTOLIC BLOOD PRESSURE: 119 MMHG | TEMPERATURE: 97.9 F | HEART RATE: 74 BPM

## 2023-07-25 DIAGNOSIS — M86.372 CHRONIC MULTIFOCAL OSTEOMYELITIS OF LEFT FOOT: Primary | ICD-10-CM

## 2023-07-25 PROCEDURE — 25010000002 ERTAPENEM PER 500 MG: Performed by: PHYSICIAN ASSISTANT

## 2023-07-25 PROCEDURE — 96365 THER/PROPH/DIAG IV INF INIT: CPT | Performed by: NURSE PRACTITIONER

## 2023-07-25 PROCEDURE — G0463 HOSPITAL OUTPT CLINIC VISIT: HCPCS | Performed by: NURSE PRACTITIONER

## 2023-07-25 RX ORDER — SODIUM CHLORIDE 9 MG/ML
250 INJECTION, SOLUTION INTRAVENOUS ONCE
Status: CANCELLED | OUTPATIENT
Start: 2023-07-26

## 2023-07-25 RX ORDER — SODIUM CHLORIDE 9 MG/ML
250 INJECTION, SOLUTION INTRAVENOUS ONCE
Status: COMPLETED | OUTPATIENT
Start: 2023-07-25 | End: 2023-07-25

## 2023-07-25 RX ADMIN — ERTAPENEM SODIUM 1000 MG: 1 INJECTION, POWDER, LYOPHILIZED, FOR SOLUTION INTRAMUSCULAR; INTRAVENOUS at 13:09

## 2023-07-25 RX ADMIN — SODIUM CHLORIDE 250 ML: 9 INJECTION, SOLUTION INTRAVENOUS at 13:16

## 2023-07-25 NOTE — TELEPHONE ENCOUNTER
HE CALLED AND WOULD LIKE A REFERRAL PLACED TO DR FELIPA MURRELL @ [facility] INFECTIOUS DIEASE.

## 2023-07-26 ENCOUNTER — INFUSION (OUTPATIENT)
Dept: ONCOLOGY | Facility: HOSPITAL | Age: 63
End: 2023-07-26
Payer: MEDICARE

## 2023-07-26 ENCOUNTER — READMISSION MANAGEMENT (OUTPATIENT)
Dept: CALL CENTER | Facility: HOSPITAL | Age: 63
End: 2023-07-26
Payer: MEDICARE

## 2023-07-26 VITALS
DIASTOLIC BLOOD PRESSURE: 73 MMHG | TEMPERATURE: 97.9 F | HEART RATE: 85 BPM | RESPIRATION RATE: 20 BRPM | SYSTOLIC BLOOD PRESSURE: 144 MMHG

## 2023-07-26 DIAGNOSIS — R78.81 BACTEREMIA: Primary | ICD-10-CM

## 2023-07-26 DIAGNOSIS — M86.372 CHRONIC MULTIFOCAL OSTEOMYELITIS OF LEFT FOOT: Primary | ICD-10-CM

## 2023-07-26 PROCEDURE — 25010000002 ERTAPENEM PER 500 MG: Performed by: PHYSICIAN ASSISTANT

## 2023-07-26 RX ORDER — SODIUM CHLORIDE 9 MG/ML
250 INJECTION, SOLUTION INTRAVENOUS ONCE
Status: COMPLETED | OUTPATIENT
Start: 2023-07-26 | End: 2023-07-26

## 2023-07-26 RX ORDER — SODIUM CHLORIDE 9 MG/ML
250 INJECTION, SOLUTION INTRAVENOUS ONCE
Status: CANCELLED | OUTPATIENT
Start: 2023-07-27

## 2023-07-26 RX ADMIN — ERTAPENEM SODIUM 1000 MG: 1 INJECTION, POWDER, LYOPHILIZED, FOR SOLUTION INTRAMUSCULAR; INTRAVENOUS at 14:22

## 2023-07-26 RX ADMIN — SODIUM CHLORIDE 250 ML: 9 INJECTION, SOLUTION INTRAVENOUS at 14:21

## 2023-07-27 ENCOUNTER — INFUSION (OUTPATIENT)
Dept: ONCOLOGY | Facility: HOSPITAL | Age: 63
End: 2023-07-27
Payer: MEDICARE

## 2023-07-27 VITALS
DIASTOLIC BLOOD PRESSURE: 71 MMHG | TEMPERATURE: 97.9 F | RESPIRATION RATE: 20 BRPM | HEART RATE: 80 BPM | SYSTOLIC BLOOD PRESSURE: 119 MMHG

## 2023-07-27 DIAGNOSIS — M86.372 CHRONIC MULTIFOCAL OSTEOMYELITIS OF LEFT FOOT: Primary | ICD-10-CM

## 2023-07-27 PROCEDURE — 25010000002 ERTAPENEM PER 500 MG: Performed by: PHYSICIAN ASSISTANT

## 2023-07-27 RX ORDER — SODIUM CHLORIDE 9 MG/ML
250 INJECTION, SOLUTION INTRAVENOUS ONCE
Status: CANCELLED | OUTPATIENT
Start: 2023-07-27

## 2023-07-27 RX ORDER — SODIUM CHLORIDE 9 MG/ML
250 INJECTION, SOLUTION INTRAVENOUS ONCE
Status: COMPLETED | OUTPATIENT
Start: 2023-07-27 | End: 2023-07-27

## 2023-07-27 RX ADMIN — SODIUM CHLORIDE 250 ML: 900 INJECTION INTRAVENOUS at 12:18

## 2023-07-27 RX ADMIN — ERTAPENEM SODIUM 1000 MG: 1 INJECTION, POWDER, LYOPHILIZED, FOR SOLUTION INTRAMUSCULAR; INTRAVENOUS at 12:18

## 2023-07-27 NOTE — OUTREACH NOTE
Medical Week 1 Survey      Flowsheet Row Responses   [facility] patient discharged from? [place]   Does the patient have one of the following disease processes/diagnoses(primary or secondary)? Other   Week 1 attempt successful? No   Unsuccessful attempts Attempt 1            Lori MUNIZ - Licensed Nurse

## 2023-07-28 ENCOUNTER — OFFICE VISIT (OUTPATIENT)
Dept: PODIATRY | Facility: CLINIC | Age: 63
End: 2023-07-28
Payer: MEDICARE

## 2023-07-28 ENCOUNTER — INFUSION (OUTPATIENT)
Dept: ONCOLOGY | Facility: HOSPITAL | Age: 63
End: 2023-07-28
Payer: MEDICARE

## 2023-07-28 VITALS
SYSTOLIC BLOOD PRESSURE: 132 MMHG | HEART RATE: 87 BPM | DIASTOLIC BLOOD PRESSURE: 74 MMHG | BODY MASS INDEX: 38.36 KG/M2 | OXYGEN SATURATION: 99 % | WEIGHT: 315 LBS | HEIGHT: 76 IN

## 2023-07-28 VITALS
RESPIRATION RATE: 18 BRPM | TEMPERATURE: 97.9 F | HEART RATE: 79 BPM | SYSTOLIC BLOOD PRESSURE: 112 MMHG | DIASTOLIC BLOOD PRESSURE: 64 MMHG

## 2023-07-28 DIAGNOSIS — Z98.890 STATUS POST LEFT FOOT SURGERY: Primary | ICD-10-CM

## 2023-07-28 DIAGNOSIS — M86.372 CHRONIC MULTIFOCAL OSTEOMYELITIS OF LEFT FOOT: Primary | ICD-10-CM

## 2023-07-28 DIAGNOSIS — M14.672 CHARCOT'S JOINT OF LEFT FOOT: ICD-10-CM

## 2023-07-28 PROCEDURE — 25010000002 ERTAPENEM PER 500 MG: Performed by: PHYSICIAN ASSISTANT

## 2023-07-28 RX ORDER — SODIUM CHLORIDE 9 MG/ML
250 INJECTION, SOLUTION INTRAVENOUS ONCE
Status: CANCELLED | OUTPATIENT
Start: 2023-07-28

## 2023-07-28 RX ORDER — SODIUM CHLORIDE 9 MG/ML
250 INJECTION, SOLUTION INTRAVENOUS ONCE
Status: COMPLETED | OUTPATIENT
Start: 2023-07-28 | End: 2023-07-28

## 2023-07-28 RX ADMIN — ERTAPENEM SODIUM 1000 MG: 1 INJECTION, POWDER, LYOPHILIZED, FOR SOLUTION INTRAMUSCULAR; INTRAVENOUS at 12:22

## 2023-07-28 RX ADMIN — SODIUM CHLORIDE 250 ML: 900 INJECTION INTRAVENOUS at 12:22

## 2023-07-28 NOTE — PROGRESS NOTES
Emre Lisa  1960  63 y.o. male   PCP: Jamaal Bravo MD 07/05/2023  BS: 114 per patient     07/28/2023    Chief Complaint   Patient presents with    Left Foot - Pain, Follow-up       History of Present Illness    Emre Lisa is a 63 y.o.male. Patient presents to clinic for a post-operative visit.       Past Medical History:   Diagnosis Date    Arthritis     Atrial fibrillation     Diabetes mellitus     Diabetic foot ulcer associated with type 2 diabetes mellitus     left great toe    Hallux malleus of left foot     Hammer toe     Hypertension     MI (myocardial infarction)     Neuropathy in diabetes     Onychomycosis     Presence of biventricular cardiac pacemaker     Rheumatoid arthritis          Past Surgical History:   Procedure Laterality Date    AMPUTATION DIGIT Right 03/05/2017    Procedure: RIGHT AMPUTATION TRANSMETATRASAL , RECESSION GASTROCNEMOUS;  Surgeon: Marco A Thompson DPM;  Location: [facility];  Service:     ANKLE FUSION Left 04/13/2023    Procedure: REDUCTION OF LEFT ANKLE DEFORMITY WITH APPLICATION OF EXTERNAL FIXATOR;  Surgeon: Marco A Thompson DPM;  Location: [facility] OR;  Service: Podiatry;  Laterality: Left;    CARDIAC CATHETERIZATION      CARDIAC DEFIBRILLATOR PLACEMENT  2010, 2016    CARPAL TUNNEL RELEASE  2015    elbow and wrist left arm    FOOT FUSION Left 05/15/2023    Procedure: REMOVAL OF EXTERNAL FIXATOR LEFT LOWER EXTREMITY WITH TIBIAL TALOCALCANEAL ARTHRODESIS  AND ALL INDICATED PROCEDURES;  Surgeon: Marco A Thompson DPM;  Location: [facility] OR;  Service: Podiatry;  Laterality: Left;    FOOT IRRIGATION, DEBRIDEMENT AND REPAIR Left 03/07/2018    Procedure: FOOT IRRIGATION, DEBRIDEMENT AND REPAIR;  Surgeon: Marco A Thompson DPM;  Location: [facility] OR;  Service:     FOOT IRRIGATION, DEBRIDEMENT AND REPAIR Left 03/10/2018    Procedure: FOOT IRRIGATION, DEBRIDEMENT AND REPAIR;  Surgeon: Marco A Thompson DPM;  Location: [facility] OR;  Service: Podiatry    FOOT WOUND CLOSURE  Right 03/02/2017    Procedure: INCISION AND DRAINAGE, RIGHT FOOT;  Surgeon: Tung Rosenthal DPM;  Location: [facility] OR;  Service:     HAMMER TOE REPAIR Left 02/15/2018    Procedure: HAMMERTOE CORRECTION LEFT SECOND, THIRD AND FOURTH TOES AND HALLUX INTERPHALANGEAL JOINT ARTHRODESIS LEFT FOOT (Micro Asnis, 4.0 & 5.0 Asnis)      (c-arm);  Surgeon: Marco A Thompson DPM;  Location: [facility] OR;  Service:     HARDWARE REMOVAL Left 03/05/2018    Procedure: ANKLE/FOOT HARDWARE REMOVAL;  Surgeon: Marco A Thompson DPM;  Location: [facility] OR;  Service:     INCISION AND DRAINAGE LEG Left 03/05/2018    Procedure: INCISION AND DRAINAGE LOWER EXTREMITY;  Surgeon: Marco A Thompson DPM;  Location: [facility] OR;  Service:     PLANTAR FASCIA RELEASE Left 11/21/2019    Procedure: PLANTAR PLANING LEFT FOOT AND ALL OTHER INDICATED PROCEDURES;  Surgeon: Marco A Thompson DPM;  Location: [facility] OR;  Service: Podiatry    TOE AMPUTATION Right 2016    TONSILECTOMY, ADENOIDECTOMY, BILATERAL MYRINGOTOMY AND TUBES      age 25         Family History   Problem Relation Age of Onset    Diabetes Father     Stroke Father     No Known Problems Maternal Grandmother     No Known Problems Maternal Grandfather     No Known Problems Paternal Grandmother     No Known Problems Paternal Grandfather     No Known Problems Daughter     No Known Problems Daughter     No Known Problems Son     Heart disease Other     Hypertension Other        Allergies   Allergen Reactions    Heparin Other (See Comments)     Heparin induce thrombocytopenia     Januvia [Sitagliptin] Hives    Lipitor [Atorvastatin] Other (See Comments)     Muscle Cramps...    Shellfish Allergy Swelling and Other (See Comments)     Age 13 this occurred doesn't remember what he ate swelled lips and face    Sulfa Antibiotics Rash     Pt was age of 4 when he was told by his family he had a reaction, pt is unsure        Social History     Socioeconomic History    Marital status:    Tobacco Use    Smoking  status: Never    Smokeless tobacco: Never   Vaping Use    Vaping Use: Never used   Substance and Sexual Activity    Alcohol use: Yes     Comment: social, once every three months    Drug use: No    Sexual activity: Defer         Current Outpatient Medications   Medication Sig Dispense Refill    acetaminophen (TYLENOL) 325 MG tablet Take 2 tablets by mouth Every 6 (Six) Hours As Needed for Mild Pain. 60 tablet 0    apixaban (ELIQUIS) 5 MG tablet tablet Take 1 tablet by mouth Every 12 (Twelve) Hours. Indications: DVT/PE (active thrombosis) 60 tablet 0    ascorbic acid (VITAMIN C) 1000 MG tablet Take 1 tablet by mouth Every Night.      Bacillus Coagulans-Inulin (Probiotic) 1-250 BILLION-MG capsule Take 1 capsule by mouth 2 (Two) Times a Day. 30 capsule 1    Blood Glucose Monitoring Suppl (ONE TOUCH ULTRA 2) w/Device kit       Cholecalciferol 125 MCG (5000 UT) tablet Take 1 tablet by mouth Every Night.      dronedarone (MULTAQ) 400 MG tablet Take 1 tablet by mouth Every Night.      ertapenem 1,000 mg in sodium chloride 0.9 % 50 mL IVPB Infuse 1,000 mg into a venous catheter Daily for 8 days.      fexofenadine (ALLEGRA) 180 MG tablet Take 1 tablet by mouth Every Night.      fluticasone (FLONASE) 50 MCG/ACT nasal spray 2 sprays into the nostril(s) as directed by provider As Needed for Rhinitis.      folic acid (FOLVITE) 1 MG tablet Take 5 tablets by mouth Daily for 6 doses. 30 tablet 0    furosemide (Lasix) 20 MG tablet Take 1 tablet by mouth Daily. 10 tablet 0    glimepiride (AMARYL) 4 MG tablet Take 1 tablet by mouth Every Night.      insulin detemir (LEVEMIR) 100 UNIT/ML injection Inject 10 Units under the skin into the appropriate area as directed Every Night. 10 mL 12    magnesium oxide (MAG-OX) 400 MG tablet Take 1 tablet by mouth 2 (Two) Times a Day.      metFORMIN (GLUCOPHAGE) 1000 MG tablet Take 1 tablet by mouth 2 (Two) Times a Day With Meals.      metoprolol succinate XL (TOPROL-XL) 25 MG 24 hr tablet Take 1  "tablet by mouth 2 (Two) Times a Day. 0.5 tablet      multivitamin (THERAGRAN) tablet tablet Take  by mouth Every Night.      ondansetron (ZOFRAN) 4 MG tablet Take 1 tablet by mouth Every 6 (Six) Hours As Needed for Nausea or Vomiting. 30 tablet 0    ONE TOUCH ULTRA TEST test strip USE TO TEST BLOOD SUGAR THREE TIMES A DAY  1    Zinc 50 MG tablet Take 1 tablet by mouth Every Night.       No current facility-administered medications for this visit.     Facility-Administered Medications Ordered in Other Visits   Medication Dose Route Frequency Provider Last Rate Last Admin    ertapenem (INVanz) 1000 mg in NS 100mL (MBP)  1,000 mg Intravenous Q24H Christi Cuenca PA-C   Stopped at 07/28/23 1252       Review of Systems   Constitutional: Negative.    HENT: Negative.     Eyes: Negative.    Respiratory: Negative.     Cardiovascular: Negative.    Gastrointestinal: Negative.    Endocrine: Negative.    Genitourinary: Negative.    Musculoskeletal:         Foot pain    Skin: Negative.    Allergic/Immunologic: Negative.    Neurological: Negative.    Hematological: Negative.    Psychiatric/Behavioral: Negative.     All other systems reviewed and are negative.      OBJECTIVE    /74   Pulse 87   Ht 193 cm (75.98\")   Wt (!) 145 kg (320 lb)   SpO2 99%   BMI 38.97 kg/m²     Physical Exam  Vitals reviewed.   Constitutional:       Appearance: Normal appearance. He is well-developed.   HENT:      Head: Normocephalic and atraumatic.   Neck:      Trachea: Trachea and phonation normal.   Cardiovascular:      Pulses:           Dorsalis pedis pulses are detected w/ Doppler on the left side.        Posterior tibial pulses are detected w/ Doppler on the left side.   Pulmonary:      Effort: Pulmonary effort is normal. No respiratory distress.   Abdominal:      General: There is no distension.      Palpations: Abdomen is soft.   Musculoskeletal:        Feet:    Feet:      Right foot:      Toenail Condition: Right toenails are " abnormally thick.      Left foot:      Skin integrity: No ulcer.      Toenail Condition: Left toenails are abnormally thick.      Comments: Swelling has improved, there is an area of erythema on the anterior aspect of the distal third of the tibia/fibula and over the anterior dorsal medial side of the ankle.  No evidence of skin breakdown noted.  Skin:     General: Skin is warm and dry.   Neurological:      Mental Status: He is alert and oriented to person, place, and time.      GCS: GCS eye subscore is 4. GCS verbal subscore is 5. GCS motor subscore is 6.   Psychiatric:         Speech: Speech normal.         Behavior: Behavior normal. Behavior is cooperative.         Thought Content: Thought content normal.         Judgment: Judgment normal.        Reviewed radiographs from today and compared to the previous films obtained in June and July 2023.  There is no change in position alignment of surgical hardware  .  Of the left ankle or foot.    Procedures  Unna boot placed on the left lower extremity without difficulty.  Patient was neurovascularly intact post procedure     ASSESSMENT AND PLAN     Diagnoses and all orders for this visit:     1. Charcot's joint, left ankle and foot (Primary)     2. S/P transmetatarsal amputation of foot, right (HCC)       Continue swelling modalities  Follow-up in 1 week for recheck and contact office for worsening symptoms                This document has been electronically signed by Lito WATSON, FNP-C, ONP-C on July 28, 2023 12:53 CDT

## 2023-07-29 ENCOUNTER — INFUSION (OUTPATIENT)
Dept: ONCOLOGY | Facility: HOSPITAL | Age: 63
End: 2023-07-29
Payer: MEDICARE

## 2023-07-29 VITALS
SYSTOLIC BLOOD PRESSURE: 108 MMHG | TEMPERATURE: 97.6 F | DIASTOLIC BLOOD PRESSURE: 58 MMHG | HEART RATE: 91 BPM | RESPIRATION RATE: 18 BRPM

## 2023-07-29 DIAGNOSIS — M86.372 CHRONIC MULTIFOCAL OSTEOMYELITIS OF LEFT FOOT: Primary | ICD-10-CM

## 2023-07-29 PROCEDURE — 25010000002 ERTAPENEM PER 500 MG: Performed by: PHYSICIAN ASSISTANT

## 2023-07-29 RX ORDER — SODIUM CHLORIDE 9 MG/ML
250 INJECTION, SOLUTION INTRAVENOUS ONCE
Status: COMPLETED | OUTPATIENT
Start: 2023-07-29 | End: 2023-07-29

## 2023-07-29 RX ORDER — SODIUM CHLORIDE 9 MG/ML
250 INJECTION, SOLUTION INTRAVENOUS ONCE
Status: CANCELLED | OUTPATIENT
Start: 2023-07-29

## 2023-07-29 RX ADMIN — SODIUM CHLORIDE 250 ML: 900 INJECTION INTRAVENOUS at 09:15

## 2023-07-29 RX ADMIN — ERTAPENEM SODIUM 1000 MG: 1 INJECTION, POWDER, LYOPHILIZED, FOR SOLUTION INTRAMUSCULAR; INTRAVENOUS at 09:15

## 2023-07-30 ENCOUNTER — INFUSION (OUTPATIENT)
Dept: ONCOLOGY | Facility: HOSPITAL | Age: 63
End: 2023-07-30
Payer: MEDICARE

## 2023-07-30 VITALS
DIASTOLIC BLOOD PRESSURE: 62 MMHG | SYSTOLIC BLOOD PRESSURE: 96 MMHG | TEMPERATURE: 97.2 F | RESPIRATION RATE: 18 BRPM | HEART RATE: 70 BPM

## 2023-07-30 DIAGNOSIS — M86.372 CHRONIC MULTIFOCAL OSTEOMYELITIS OF LEFT FOOT: Primary | ICD-10-CM

## 2023-07-30 PROCEDURE — 25010000002 ERTAPENEM PER 500 MG: Performed by: PHYSICIAN ASSISTANT

## 2023-07-30 RX ORDER — SODIUM CHLORIDE 9 MG/ML
250 INJECTION, SOLUTION INTRAVENOUS ONCE
Status: COMPLETED | OUTPATIENT
Start: 2023-07-30 | End: 2023-07-30

## 2023-07-30 RX ORDER — SODIUM CHLORIDE 9 MG/ML
250 INJECTION, SOLUTION INTRAVENOUS ONCE
Status: CANCELLED | OUTPATIENT
Start: 2023-07-31

## 2023-07-30 RX ADMIN — ERTAPENEM SODIUM 1000 MG: 1 INJECTION, POWDER, LYOPHILIZED, FOR SOLUTION INTRAMUSCULAR; INTRAVENOUS at 09:05

## 2023-07-30 RX ADMIN — SODIUM CHLORIDE 250 ML: 900 INJECTION INTRAVENOUS at 09:05

## 2023-07-31 ENCOUNTER — INFUSION (OUTPATIENT)
Dept: ONCOLOGY | Facility: HOSPITAL | Age: 63
End: 2023-07-31
Payer: MEDICARE

## 2023-07-31 VITALS
TEMPERATURE: 97 F | DIASTOLIC BLOOD PRESSURE: 60 MMHG | SYSTOLIC BLOOD PRESSURE: 106 MMHG | HEART RATE: 81 BPM | RESPIRATION RATE: 18 BRPM

## 2023-07-31 DIAGNOSIS — M86.372 CHRONIC MULTIFOCAL OSTEOMYELITIS OF LEFT FOOT: Primary | ICD-10-CM

## 2023-07-31 PROCEDURE — 25010000002 ERTAPENEM PER 500 MG: Performed by: PHYSICIAN ASSISTANT

## 2023-07-31 PROCEDURE — 96365 THER/PROPH/DIAG IV INF INIT: CPT | Performed by: NURSE PRACTITIONER

## 2023-07-31 RX ORDER — SODIUM CHLORIDE 0.9 % (FLUSH) 0.9 %
20 SYRINGE (ML) INJECTION AS NEEDED
Status: CANCELLED | OUTPATIENT
Start: 2023-07-31

## 2023-07-31 RX ORDER — SODIUM CHLORIDE 9 MG/ML
250 INJECTION, SOLUTION INTRAVENOUS ONCE
Status: COMPLETED | OUTPATIENT
Start: 2023-07-31 | End: 2023-07-31

## 2023-07-31 RX ORDER — SODIUM CHLORIDE 0.9 % (FLUSH) 0.9 %
10 SYRINGE (ML) INJECTION AS NEEDED
Status: DISCONTINUED | OUTPATIENT
Start: 2023-07-31 | End: 2023-07-31 | Stop reason: HOSPADM

## 2023-07-31 RX ORDER — SODIUM CHLORIDE 9 MG/ML
250 INJECTION, SOLUTION INTRAVENOUS ONCE
Status: CANCELLED | OUTPATIENT
Start: 2023-08-01

## 2023-07-31 RX ORDER — SODIUM CHLORIDE 0.9 % (FLUSH) 0.9 %
10 SYRINGE (ML) INJECTION AS NEEDED
Status: CANCELLED | OUTPATIENT
Start: 2023-07-31

## 2023-07-31 RX ADMIN — ERTAPENEM SODIUM 1000 MG: 1 INJECTION, POWDER, LYOPHILIZED, FOR SOLUTION INTRAMUSCULAR; INTRAVENOUS at 12:59

## 2023-07-31 RX ADMIN — SODIUM CHLORIDE 250 ML: 9 INJECTION, SOLUTION INTRAVENOUS at 12:58

## 2023-08-01 ENCOUNTER — INFUSION (OUTPATIENT)
Dept: ONCOLOGY | Facility: HOSPITAL | Age: 63
End: 2023-08-01
Payer: MEDICARE

## 2023-08-01 VITALS — DIASTOLIC BLOOD PRESSURE: 70 MMHG | SYSTOLIC BLOOD PRESSURE: 111 MMHG | HEART RATE: 86 BPM | TEMPERATURE: 97.6 F

## 2023-08-01 DIAGNOSIS — M86.372 CHRONIC MULTIFOCAL OSTEOMYELITIS OF LEFT FOOT: Primary | ICD-10-CM

## 2023-08-01 PROCEDURE — 96365 THER/PROPH/DIAG IV INF INIT: CPT | Performed by: NURSE PRACTITIONER

## 2023-08-01 PROCEDURE — 25010000002 ERTAPENEM PER 500 MG: Performed by: PHYSICIAN ASSISTANT

## 2023-08-01 RX ORDER — SODIUM CHLORIDE 0.9 % (FLUSH) 0.9 %
20 SYRINGE (ML) INJECTION AS NEEDED
Status: DISCONTINUED | OUTPATIENT
Start: 2023-08-01 | End: 2023-08-01 | Stop reason: HOSPADM

## 2023-08-01 RX ORDER — SODIUM CHLORIDE 0.9 % (FLUSH) 0.9 %
10 SYRINGE (ML) INJECTION AS NEEDED
Status: DISCONTINUED | OUTPATIENT
Start: 2023-08-01 | End: 2023-08-01 | Stop reason: HOSPADM

## 2023-08-01 RX ORDER — SODIUM CHLORIDE 9 MG/ML
250 INJECTION, SOLUTION INTRAVENOUS ONCE
Status: COMPLETED | OUTPATIENT
Start: 2023-08-01 | End: 2023-08-01

## 2023-08-01 RX ORDER — SODIUM CHLORIDE 0.9 % (FLUSH) 0.9 %
20 SYRINGE (ML) INJECTION AS NEEDED
OUTPATIENT
Start: 2023-08-01

## 2023-08-01 RX ORDER — SODIUM CHLORIDE 9 MG/ML
250 INJECTION, SOLUTION INTRAVENOUS ONCE
Status: CANCELLED | OUTPATIENT
Start: 2023-08-01

## 2023-08-01 RX ORDER — SODIUM CHLORIDE 0.9 % (FLUSH) 0.9 %
10 SYRINGE (ML) INJECTION AS NEEDED
OUTPATIENT
Start: 2023-08-01

## 2023-08-01 RX ADMIN — SODIUM CHLORIDE 250 ML: 9 INJECTION, SOLUTION INTRAVENOUS at 13:06

## 2023-08-01 RX ADMIN — ERTAPENEM SODIUM 1000 MG: 1 INJECTION, POWDER, LYOPHILIZED, FOR SOLUTION INTRAMUSCULAR; INTRAVENOUS at 13:07

## 2023-08-02 ENCOUNTER — INFUSION (OUTPATIENT)
Dept: ONCOLOGY | Facility: HOSPITAL | Age: 63
End: 2023-08-02
Payer: MEDICARE

## 2023-08-02 ENCOUNTER — READMISSION MANAGEMENT (OUTPATIENT)
Dept: CALL CENTER | Facility: HOSPITAL | Age: 63
End: 2023-08-02
Payer: MEDICARE

## 2023-08-02 VITALS
HEART RATE: 79 BPM | DIASTOLIC BLOOD PRESSURE: 71 MMHG | SYSTOLIC BLOOD PRESSURE: 117 MMHG | TEMPERATURE: 97.6 F | RESPIRATION RATE: 18 BRPM

## 2023-08-02 DIAGNOSIS — M86.372 CHRONIC MULTIFOCAL OSTEOMYELITIS OF LEFT FOOT: Primary | ICD-10-CM

## 2023-08-02 PROCEDURE — 25010000002 ERTAPENEM PER 500 MG: Performed by: PHYSICIAN ASSISTANT

## 2023-08-02 RX ORDER — SODIUM CHLORIDE 9 MG/ML
250 INJECTION, SOLUTION INTRAVENOUS ONCE
Status: COMPLETED | OUTPATIENT
Start: 2023-08-02 | End: 2023-08-02

## 2023-08-02 RX ORDER — SODIUM CHLORIDE 9 MG/ML
250 INJECTION, SOLUTION INTRAVENOUS ONCE
Status: CANCELLED | OUTPATIENT
Start: 2023-08-02

## 2023-08-02 RX ADMIN — ERTAPENEM SODIUM 1000 MG: 1 INJECTION, POWDER, LYOPHILIZED, FOR SOLUTION INTRAMUSCULAR; INTRAVENOUS at 13:22

## 2023-08-02 RX ADMIN — SODIUM CHLORIDE 250 ML: 9 INJECTION, SOLUTION INTRAVENOUS at 13:21

## 2023-08-03 ENCOUNTER — INFUSION (OUTPATIENT)
Dept: ONCOLOGY | Facility: HOSPITAL | Age: 63
End: 2023-08-03
Payer: MEDICARE

## 2023-08-03 VITALS
HEART RATE: 89 BPM | RESPIRATION RATE: 18 BRPM | TEMPERATURE: 96.8 F | DIASTOLIC BLOOD PRESSURE: 70 MMHG | SYSTOLIC BLOOD PRESSURE: 128 MMHG

## 2023-08-03 DIAGNOSIS — M86.372 CHRONIC MULTIFOCAL OSTEOMYELITIS OF LEFT FOOT: Primary | ICD-10-CM

## 2023-08-03 PROCEDURE — 25010000002 ERTAPENEM PER 500 MG: Performed by: PHYSICIAN ASSISTANT

## 2023-08-03 RX ORDER — SODIUM CHLORIDE 9 MG/ML
250 INJECTION, SOLUTION INTRAVENOUS ONCE
Status: CANCELLED | OUTPATIENT
Start: 2023-08-04

## 2023-08-03 RX ORDER — SODIUM CHLORIDE 9 MG/ML
250 INJECTION, SOLUTION INTRAVENOUS ONCE
Status: COMPLETED | OUTPATIENT
Start: 2023-08-03 | End: 2023-08-03

## 2023-08-03 RX ADMIN — ERTAPENEM SODIUM 1000 MG: 1 INJECTION, POWDER, LYOPHILIZED, FOR SOLUTION INTRAMUSCULAR; INTRAVENOUS at 12:36

## 2023-08-03 RX ADMIN — SODIUM CHLORIDE 250 ML: 9 INJECTION, SOLUTION INTRAVENOUS at 12:36

## 2023-08-04 ENCOUNTER — OFFICE VISIT (OUTPATIENT)
Dept: PODIATRY | Facility: CLINIC | Age: 63
End: 2023-08-04
Payer: MEDICARE

## 2023-08-04 ENCOUNTER — INFUSION (OUTPATIENT)
Dept: ONCOLOGY | Facility: HOSPITAL | Age: 63
End: 2023-08-04
Payer: MEDICARE

## 2023-08-04 VITALS
BODY MASS INDEX: 38.36 KG/M2 | HEART RATE: 81 BPM | SYSTOLIC BLOOD PRESSURE: 133 MMHG | OXYGEN SATURATION: 98 % | HEIGHT: 76 IN | DIASTOLIC BLOOD PRESSURE: 78 MMHG | WEIGHT: 315 LBS

## 2023-08-04 VITALS
DIASTOLIC BLOOD PRESSURE: 66 MMHG | HEART RATE: 78 BPM | RESPIRATION RATE: 18 BRPM | SYSTOLIC BLOOD PRESSURE: 103 MMHG | TEMPERATURE: 96.9 F

## 2023-08-04 DIAGNOSIS — Z98.890 STATUS POST LEFT FOOT SURGERY: Primary | ICD-10-CM

## 2023-08-04 DIAGNOSIS — M86.372 CHRONIC MULTIFOCAL OSTEOMYELITIS OF LEFT FOOT: Primary | ICD-10-CM

## 2023-08-04 DIAGNOSIS — M14.672 CHARCOT'S JOINT OF LEFT FOOT: ICD-10-CM

## 2023-08-04 PROCEDURE — 25010000002 ERTAPENEM PER 500 MG: Performed by: PHYSICIAN ASSISTANT

## 2023-08-04 RX ORDER — SODIUM CHLORIDE 9 MG/ML
250 INJECTION, SOLUTION INTRAVENOUS ONCE
Status: CANCELLED | OUTPATIENT
Start: 2023-08-04

## 2023-08-04 RX ORDER — SODIUM CHLORIDE 9 MG/ML
250 INJECTION, SOLUTION INTRAVENOUS ONCE
Status: COMPLETED | OUTPATIENT
Start: 2023-08-04 | End: 2023-08-04

## 2023-08-04 RX ADMIN — SODIUM CHLORIDE 250 ML: 900 INJECTION INTRAVENOUS at 09:56

## 2023-08-04 RX ADMIN — ERTAPENEM SODIUM 1000 MG: 1 INJECTION, POWDER, LYOPHILIZED, FOR SOLUTION INTRAMUSCULAR; INTRAVENOUS at 09:56

## 2023-08-05 ENCOUNTER — INFUSION (OUTPATIENT)
Dept: ONCOLOGY | Facility: HOSPITAL | Age: 63
End: 2023-08-05
Payer: MEDICARE

## 2023-08-05 VITALS — DIASTOLIC BLOOD PRESSURE: 64 MMHG | SYSTOLIC BLOOD PRESSURE: 100 MMHG | HEART RATE: 76 BPM

## 2023-08-05 DIAGNOSIS — M86.372 CHRONIC MULTIFOCAL OSTEOMYELITIS OF LEFT FOOT: Primary | ICD-10-CM

## 2023-08-05 PROCEDURE — 25010000002 ERTAPENEM PER 500 MG: Performed by: FAMILY MEDICINE

## 2023-08-05 RX ORDER — SODIUM CHLORIDE 9 MG/ML
250 INJECTION, SOLUTION INTRAVENOUS ONCE
Status: CANCELLED | OUTPATIENT
Start: 2023-08-05

## 2023-08-05 RX ORDER — SODIUM CHLORIDE 9 MG/ML
250 INJECTION, SOLUTION INTRAVENOUS ONCE
Status: DISCONTINUED | OUTPATIENT
Start: 2023-08-05 | End: 2023-08-05 | Stop reason: HOSPADM

## 2023-08-05 RX ADMIN — ERTAPENEM SODIUM 1000 MG: 1 INJECTION, POWDER, LYOPHILIZED, FOR SOLUTION INTRAMUSCULAR; INTRAVENOUS at 10:21

## 2023-08-06 ENCOUNTER — INFUSION (OUTPATIENT)
Dept: ONCOLOGY | Facility: HOSPITAL | Age: 63
End: 2023-08-06
Payer: MEDICARE

## 2023-08-06 VITALS — TEMPERATURE: 97.5 F | SYSTOLIC BLOOD PRESSURE: 113 MMHG | DIASTOLIC BLOOD PRESSURE: 66 MMHG

## 2023-08-06 DIAGNOSIS — M86.372 CHRONIC MULTIFOCAL OSTEOMYELITIS OF LEFT FOOT: Primary | ICD-10-CM

## 2023-08-06 PROCEDURE — 25010000002 ERTAPENEM PER 500 MG: Performed by: FAMILY MEDICINE

## 2023-08-06 RX ORDER — SODIUM CHLORIDE 9 MG/ML
250 INJECTION, SOLUTION INTRAVENOUS ONCE
Status: CANCELLED | OUTPATIENT
Start: 2023-08-06

## 2023-08-06 RX ORDER — SODIUM CHLORIDE 9 MG/ML
250 INJECTION, SOLUTION INTRAVENOUS ONCE
Status: DISCONTINUED | OUTPATIENT
Start: 2023-08-06 | End: 2023-08-06 | Stop reason: HOSPADM

## 2023-08-06 RX ADMIN — ERTAPENEM SODIUM 1000 MG: 1 INJECTION, POWDER, LYOPHILIZED, FOR SOLUTION INTRAMUSCULAR; INTRAVENOUS at 10:15

## 2023-08-07 ENCOUNTER — TELEPHONE (OUTPATIENT)
Dept: ONCOLOGY | Facility: HOSPITAL | Age: 63
End: 2023-08-07
Payer: MEDICARE

## 2023-08-07 ENCOUNTER — INFUSION (OUTPATIENT)
Dept: ONCOLOGY | Facility: HOSPITAL | Age: 63
End: 2023-08-07
Payer: MEDICARE

## 2023-08-07 VITALS — HEART RATE: 76 BPM | TEMPERATURE: 97.3 F | DIASTOLIC BLOOD PRESSURE: 65 MMHG | SYSTOLIC BLOOD PRESSURE: 103 MMHG

## 2023-08-07 DIAGNOSIS — M86.372 CHRONIC MULTIFOCAL OSTEOMYELITIS OF LEFT FOOT: Primary | ICD-10-CM

## 2023-08-07 PROCEDURE — 96365 THER/PROPH/DIAG IV INF INIT: CPT | Performed by: NURSE PRACTITIONER

## 2023-08-07 PROCEDURE — 25010000002 ERTAPENEM PER 500 MG: Performed by: FAMILY MEDICINE

## 2023-08-07 RX ORDER — SODIUM CHLORIDE 9 MG/ML
250 INJECTION, SOLUTION INTRAVENOUS ONCE
Status: COMPLETED | OUTPATIENT
Start: 2023-08-07 | End: 2023-08-07

## 2023-08-07 RX ORDER — SODIUM CHLORIDE 9 MG/ML
250 INJECTION, SOLUTION INTRAVENOUS ONCE
Status: CANCELLED | OUTPATIENT
Start: 2023-08-07

## 2023-08-07 RX ADMIN — ERTAPENEM SODIUM 1000 MG: 1 INJECTION, POWDER, LYOPHILIZED, FOR SOLUTION INTRAMUSCULAR; INTRAVENOUS at 10:53

## 2023-08-07 RX ADMIN — SODIUM CHLORIDE 250 ML: 9 INJECTION, SOLUTION INTRAVENOUS at 10:30

## 2023-08-07 NOTE — TELEPHONE ENCOUNTER
Spoke with Dr rBavo office about needing order for pt to continue his antibiotics, staff is suppose to fax of the orders in the next few minutes

## 2023-08-08 ENCOUNTER — INFUSION (OUTPATIENT)
Dept: ONCOLOGY | Facility: HOSPITAL | Age: 63
End: 2023-08-08
Payer: MEDICARE

## 2023-08-08 ENCOUNTER — OFFICE VISIT (OUTPATIENT)
Dept: PODIATRY | Facility: CLINIC | Age: 63
End: 2023-08-08
Payer: MEDICARE

## 2023-08-08 VITALS
HEIGHT: 76 IN | OXYGEN SATURATION: 99 % | DIASTOLIC BLOOD PRESSURE: 88 MMHG | BODY MASS INDEX: 38.36 KG/M2 | SYSTOLIC BLOOD PRESSURE: 138 MMHG | WEIGHT: 315 LBS | HEART RATE: 78 BPM

## 2023-08-08 VITALS
SYSTOLIC BLOOD PRESSURE: 125 MMHG | HEART RATE: 73 BPM | TEMPERATURE: 97.1 F | OXYGEN SATURATION: 97 % | RESPIRATION RATE: 18 BRPM | DIASTOLIC BLOOD PRESSURE: 63 MMHG

## 2023-08-08 DIAGNOSIS — M14.672 CHARCOT'S JOINT OF LEFT FOOT: Primary | ICD-10-CM

## 2023-08-08 DIAGNOSIS — L97.509 TYPE 2 DIABETES MELLITUS WITH FOOT ULCER, UNSPECIFIED WHETHER LONG TERM INSULIN USE: ICD-10-CM

## 2023-08-08 DIAGNOSIS — Z89.431 S/P TRANSMETATARSAL AMPUTATION OF FOOT, RIGHT: ICD-10-CM

## 2023-08-08 DIAGNOSIS — M86.372 CHRONIC MULTIFOCAL OSTEOMYELITIS OF LEFT FOOT: Primary | ICD-10-CM

## 2023-08-08 DIAGNOSIS — Z98.890 STATUS POST LEFT FOOT SURGERY: ICD-10-CM

## 2023-08-08 DIAGNOSIS — E11.621 TYPE 2 DIABETES MELLITUS WITH FOOT ULCER, UNSPECIFIED WHETHER LONG TERM INSULIN USE: ICD-10-CM

## 2023-08-08 DIAGNOSIS — M86.372 CHRONIC MULTIFOCAL OSTEOMYELITIS OF LEFT FOOT: ICD-10-CM

## 2023-08-08 PROCEDURE — 25010000002 ERTAPENEM PER 500 MG: Performed by: FAMILY MEDICINE

## 2023-08-08 RX ORDER — SODIUM CHLORIDE 9 MG/ML
250 INJECTION, SOLUTION INTRAVENOUS ONCE
Status: COMPLETED | OUTPATIENT
Start: 2023-08-08 | End: 2023-08-08

## 2023-08-08 RX ORDER — SODIUM CHLORIDE 9 MG/ML
250 INJECTION, SOLUTION INTRAVENOUS ONCE
Status: CANCELLED | OUTPATIENT
Start: 2023-08-08

## 2023-08-08 RX ADMIN — ERTAPENEM SODIUM 1000 MG: 1 INJECTION, POWDER, LYOPHILIZED, FOR SOLUTION INTRAMUSCULAR; INTRAVENOUS at 10:28

## 2023-08-08 RX ADMIN — SODIUM CHLORIDE 250 ML: 9 INJECTION, SOLUTION INTRAVENOUS at 10:28

## 2023-08-08 NOTE — PROGRESS NOTES
Emre Lisa  1960  63 y.o. male   PCP: Jamaal Bravo MD 07/05/2023  BS: 142 per patient     08/08/2023    Chief Complaint   Patient presents with    Left Foot - Pain, Follow-up       History of Present Illness    Emre Lisa is a 63 y.o.male. Patient presents to clinic for a post-operative visit.       Past Medical History:   Diagnosis Date    Arthritis     Atrial fibrillation     Diabetes mellitus     Diabetic foot ulcer associated with type 2 diabetes mellitus     left great toe    Hallux malleus of left foot     Hammer toe     Hypertension     MI (myocardial infarction)     Neuropathy in diabetes     Onychomycosis     Presence of biventricular cardiac pacemaker     Rheumatoid arthritis          Past Surgical History:   Procedure Laterality Date    AMPUTATION DIGIT Right 03/05/2017    Procedure: RIGHT AMPUTATION TRANSMETATRASAL , RECESSION GASTROCNEMOUS;  Surgeon: Marco A Thompson DPM;  Location: [facility];  Service:     ANKLE FUSION Left 04/13/2023    Procedure: REDUCTION OF LEFT ANKLE DEFORMITY WITH APPLICATION OF EXTERNAL FIXATOR;  Surgeon: Marco A Thompson DPM;  Location: [facility] OR;  Service: Podiatry;  Laterality: Left;    CARDIAC CATHETERIZATION      CARDIAC DEFIBRILLATOR PLACEMENT  2010, 2016    CARPAL TUNNEL RELEASE  2015    elbow and wrist left arm    FOOT FUSION Left 05/15/2023    Procedure: REMOVAL OF EXTERNAL FIXATOR LEFT LOWER EXTREMITY WITH TIBIAL TALOCALCANEAL ARTHRODESIS  AND ALL INDICATED PROCEDURES;  Surgeon: Marco A Thompson DPM;  Location: [facility] OR;  Service: Podiatry;  Laterality: Left;    FOOT IRRIGATION, DEBRIDEMENT AND REPAIR Left 03/07/2018    Procedure: FOOT IRRIGATION, DEBRIDEMENT AND REPAIR;  Surgeon: Marco A Thompson DPM;  Location: [facility] OR;  Service:     FOOT IRRIGATION, DEBRIDEMENT AND REPAIR Left 03/10/2018    Procedure: FOOT IRRIGATION, DEBRIDEMENT AND REPAIR;  Surgeon: Marco A Thompson DPM;  Location: [facility] OR;  Service: Podiatry    FOOT WOUND CLOSURE  Right 03/02/2017    Procedure: INCISION AND DRAINAGE, RIGHT FOOT;  Surgeon: Tung Rosenthal DPM;  Location: [facility] OR;  Service:     HAMMER TOE REPAIR Left 02/15/2018    Procedure: HAMMERTOE CORRECTION LEFT SECOND, THIRD AND FOURTH TOES AND HALLUX INTERPHALANGEAL JOINT ARTHRODESIS LEFT FOOT (Micro Asnis, 4.0 & 5.0 Asnis)      (c-arm);  Surgeon: Marco A Thompson DPM;  Location: [facility] OR;  Service:     HARDWARE REMOVAL Left 03/05/2018    Procedure: ANKLE/FOOT HARDWARE REMOVAL;  Surgeon: Marco A Thompson DPM;  Location: [facility] OR;  Service:     INCISION AND DRAINAGE LEG Left 03/05/2018    Procedure: INCISION AND DRAINAGE LOWER EXTREMITY;  Surgeon: Marco A Thompson DPM;  Location: [facility] OR;  Service:     PLANTAR FASCIA RELEASE Left 11/21/2019    Procedure: PLANTAR PLANING LEFT FOOT AND ALL OTHER INDICATED PROCEDURES;  Surgeon: Marco A Thompson DPM;  Location: [facility] OR;  Service: Podiatry    TOE AMPUTATION Right 2016    TONSILECTOMY, ADENOIDECTOMY, BILATERAL MYRINGOTOMY AND TUBES      age 25         Family History   Problem Relation Age of Onset    Diabetes Father     Stroke Father     No Known Problems Maternal Grandmother     No Known Problems Maternal Grandfather     No Known Problems Paternal Grandmother     No Known Problems Paternal Grandfather     No Known Problems Daughter     No Known Problems Daughter     No Known Problems Son     Heart disease Other     Hypertension Other        Allergies   Allergen Reactions    Heparin Other (See Comments)     Heparin induce thrombocytopenia     Januvia [Sitagliptin] Hives    Lipitor [Atorvastatin] Other (See Comments)     Muscle Cramps...    Shellfish Allergy Swelling and Other (See Comments)     Age 13 this occurred doesn't remember what he ate swelled lips and face    Sulfa Antibiotics Rash     Pt was age of 4 when he was told by his family he had a reaction, pt is unsure        Social History     Socioeconomic History    Marital status:    Tobacco Use    Smoking  status: Never    Smokeless tobacco: Never   Vaping Use    Vaping Use: Never used   Substance and Sexual Activity    Alcohol use: Yes     Comment: social, once every three months    Drug use: No    Sexual activity: Defer         Current Outpatient Medications   Medication Sig Dispense Refill    acetaminophen (TYLENOL) 325 MG tablet Take 2 tablets by mouth Every 6 (Six) Hours As Needed for Mild Pain. 60 tablet 0    apixaban (ELIQUIS) 5 MG tablet tablet Take 1 tablet by mouth Every 12 (Twelve) Hours. Indications: DVT/PE (active thrombosis) 60 tablet 0    ascorbic acid (VITAMIN C) 1000 MG tablet Take 1 tablet by mouth Every Night.      Bacillus Coagulans-Inulin (Probiotic) 1-250 BILLION-MG capsule Take 1 capsule by mouth 2 (Two) Times a Day. 30 capsule 1    Blood Glucose Monitoring Suppl (ONE TOUCH ULTRA 2) w/Device kit       Cholecalciferol 125 MCG (5000 UT) tablet Take 1 tablet by mouth Every Night.      dronedarone (MULTAQ) 400 MG tablet Take 1 tablet by mouth Every Night.      fexofenadine (ALLEGRA) 180 MG tablet Take 1 tablet by mouth Every Night.      fluticasone (FLONASE) 50 MCG/ACT nasal spray 2 sprays into the nostril(s) as directed by provider As Needed for Rhinitis.      furosemide (Lasix) 20 MG tablet Take 1 tablet by mouth Daily. 10 tablet 0    glimepiride (AMARYL) 4 MG tablet Take 1 tablet by mouth Every Night.      magnesium oxide (MAG-OX) 400 MG tablet Take 1 tablet by mouth 2 (Two) Times a Day.      metFORMIN (GLUCOPHAGE) 1000 MG tablet Take 1 tablet by mouth 2 (Two) Times a Day With Meals.      metoprolol succinate XL (TOPROL-XL) 25 MG 24 hr tablet Take 1 tablet by mouth 2 (Two) Times a Day. 0.5 tablet      multivitamin (THERAGRAN) tablet tablet Take  by mouth Every Night.      ondansetron (ZOFRAN) 4 MG tablet Take 1 tablet by mouth Every 6 (Six) Hours As Needed for Nausea or Vomiting. 30 tablet 0    ONE TOUCH ULTRA TEST test strip USE TO TEST BLOOD SUGAR THREE TIMES A DAY  1    Zinc 50 MG tablet Take  "1 tablet by mouth Every Night.      insulin detemir (LEVEMIR) 100 UNIT/ML injection Inject 10 Units under the skin into the appropriate area as directed Every Night. 10 mL 12     No current facility-administered medications for this visit.       Review of Systems   Constitutional: Negative.    HENT: Negative.     Eyes: Negative.    Respiratory: Negative.     Cardiovascular: Negative.    Gastrointestinal: Negative.    Endocrine: Negative.    Genitourinary: Negative.    Musculoskeletal:         Foot pain    Skin: Negative.    Allergic/Immunologic: Negative.    Neurological: Negative.    Hematological: Negative.    Psychiatric/Behavioral: Negative.     All other systems reviewed and are negative.      OBJECTIVE    /88   Pulse 78   Ht 193 cm (75.98\")   Wt (!) 145 kg (320 lb)   SpO2 99%   BMI 38.97 kg/mý     Physical Exam  Vitals reviewed.   Constitutional:       Appearance: Normal appearance. He is well-developed.   HENT:      Head: Normocephalic and atraumatic.   Neck:      Trachea: Trachea and phonation normal.   Cardiovascular:      Pulses:           Dorsalis pedis pulses are detected w/ Doppler on the left side.        Posterior tibial pulses are detected w/ Doppler on the left side.   Pulmonary:      Effort: Pulmonary effort is normal. No respiratory distress.   Abdominal:      General: There is no distension.      Palpations: Abdomen is soft.   Musculoskeletal:        Feet:    Feet:      Right foot:      Toenail Condition: Right toenails are abnormally thick.      Left foot:      Skin integrity: No ulcer.      Toenail Condition: Left toenails are abnormally thick.      Comments: Swelling has improved,     Skin:     General: Skin is warm and dry.      Capillary Refill: Capillary refill takes less than 2 seconds.   Neurological:      Mental Status: He is alert and oriented to person, place, and time.      GCS: GCS eye subscore is 4. GCS verbal subscore is 5. GCS motor subscore is 6.   Psychiatric:         " Speech: Speech normal.         Behavior: Behavior normal. Behavior is cooperative.         Thought Content: Thought content normal.         Judgment: Judgment normal.         Diagnosis Plan   1. Charcot's joint of left foot  Compression Stockings    Ambulatory Referral to Physical Therapy Evaluate and treat    Footwear Diabetic      2. Type 2 diabetes mellitus with foot ulcer, unspecified whether long term insulin use  Compression Stockings    Ambulatory Referral to Physical Therapy Evaluate and treat    Footwear Diabetic      3. Status post left foot surgery  Compression Stockings    Ambulatory Referral to Physical Therapy Evaluate and treat    Footwear Diabetic      4. Chronic multifocal osteomyelitis of left foot  Ambulatory Referral to Physical Therapy Evaluate and treat    Footwear Diabetic      5. S/P transmetatarsal amputation of foot, right  Ambulatory Referral to Physical Therapy Evaluate and treat    Footwear Diabetic               Continue swelling modalities  Follow-up in 1 month for recheck  Initiate physical therapy  Contact office for worsening symptoms  Patient is status post 3 months arthrodesis of the left ankle/foot.  He will need custom shoe gear for the left lower extremity due to his irregularly-shaped foot/ankle post procedure.  He is ready and willing to proceed at this point with fabrication of a custom made shoe.  He will need this device to prevent skin breakdown and instability to his foot and ankle while he continues to perform his activities of daily living.          This document has been electronically signed by Lito WATSON FNPABILIO, ONP-C on August 8, 2023 09:29 CDT

## 2023-08-09 ENCOUNTER — TELEPHONE (OUTPATIENT)
Dept: PODIATRY | Facility: CLINIC | Age: 63
End: 2023-08-09
Payer: MEDICARE

## 2023-08-09 ENCOUNTER — INFUSION (OUTPATIENT)
Dept: ONCOLOGY | Facility: HOSPITAL | Age: 63
End: 2023-08-09
Payer: MEDICARE

## 2023-08-09 VITALS
HEART RATE: 74 BPM | DIASTOLIC BLOOD PRESSURE: 79 MMHG | RESPIRATION RATE: 18 BRPM | SYSTOLIC BLOOD PRESSURE: 127 MMHG | TEMPERATURE: 97 F

## 2023-08-09 DIAGNOSIS — M86.372 CHRONIC MULTIFOCAL OSTEOMYELITIS OF LEFT FOOT: Primary | ICD-10-CM

## 2023-08-09 PROCEDURE — 25010000002 ERTAPENEM PER 500 MG: Performed by: FAMILY MEDICINE

## 2023-08-09 RX ORDER — SODIUM CHLORIDE 9 MG/ML
250 INJECTION, SOLUTION INTRAVENOUS ONCE
Status: CANCELLED | OUTPATIENT
Start: 2023-08-09

## 2023-08-09 RX ORDER — SODIUM CHLORIDE 9 MG/ML
250 INJECTION, SOLUTION INTRAVENOUS ONCE
Status: COMPLETED | OUTPATIENT
Start: 2023-08-09 | End: 2023-08-09

## 2023-08-09 RX ADMIN — ERTAPENEM SODIUM 1000 MG: 1 INJECTION, POWDER, LYOPHILIZED, FOR SOLUTION INTRAMUSCULAR; INTRAVENOUS at 10:21

## 2023-08-09 RX ADMIN — SODIUM CHLORIDE 250 ML: 9 INJECTION, SOLUTION INTRAVENOUS at 10:21

## 2023-08-09 NOTE — TELEPHONE ENCOUNTER
Called patient,Patient was calling regarding to what type of compression socks he needed to purchase

## 2023-08-09 NOTE — TELEPHONE ENCOUNTER
Patient called today and would like to speak with provider regarding an urgent matter.  Please have him call patient at [phone]

## 2023-08-10 ENCOUNTER — READMISSION MANAGEMENT (OUTPATIENT)
Dept: CALL CENTER | Facility: HOSPITAL | Age: 63
End: 2023-08-10
Payer: MEDICARE

## 2023-08-10 ENCOUNTER — INFUSION (OUTPATIENT)
Dept: ONCOLOGY | Facility: HOSPITAL | Age: 63
End: 2023-08-10
Payer: MEDICARE

## 2023-08-10 VITALS
SYSTOLIC BLOOD PRESSURE: 118 MMHG | DIASTOLIC BLOOD PRESSURE: 70 MMHG | TEMPERATURE: 96.8 F | HEART RATE: 80 BPM | RESPIRATION RATE: 18 BRPM

## 2023-08-10 DIAGNOSIS — M86.372 CHRONIC MULTIFOCAL OSTEOMYELITIS OF LEFT FOOT: Primary | ICD-10-CM

## 2023-08-10 PROCEDURE — 25010000002 ERTAPENEM PER 500 MG: Performed by: FAMILY MEDICINE

## 2023-08-10 RX ORDER — SODIUM CHLORIDE 9 MG/ML
250 INJECTION, SOLUTION INTRAVENOUS ONCE
Status: COMPLETED | OUTPATIENT
Start: 2023-08-10 | End: 2023-08-10

## 2023-08-10 RX ORDER — SODIUM CHLORIDE 9 MG/ML
250 INJECTION, SOLUTION INTRAVENOUS ONCE
Status: CANCELLED | OUTPATIENT
Start: 2023-08-10

## 2023-08-10 RX ADMIN — ERTAPENEM SODIUM 1000 MG: 1 INJECTION, POWDER, LYOPHILIZED, FOR SOLUTION INTRAMUSCULAR; INTRAVENOUS at 10:33

## 2023-08-10 RX ADMIN — SODIUM CHLORIDE 250 ML: 9 INJECTION, SOLUTION INTRAVENOUS at 10:33

## 2023-08-10 NOTE — OUTREACH NOTE
Medical Week 3 Survey      Flowsheet Row Responses   [facility] patient discharged from? [place]   Does the patient have one of the following disease processes/diagnoses(primary or secondary)? Other   Week 3 attempt successful? No   Unsuccessful attempts Attempt 1            MARY GASTELUM - Registered Nurse

## 2023-08-11 ENCOUNTER — INFUSION (OUTPATIENT)
Dept: ONCOLOGY | Facility: HOSPITAL | Age: 63
End: 2023-08-11
Payer: MEDICARE

## 2023-08-11 VITALS
DIASTOLIC BLOOD PRESSURE: 66 MMHG | HEART RATE: 77 BPM | TEMPERATURE: 97.4 F | RESPIRATION RATE: 20 BRPM | SYSTOLIC BLOOD PRESSURE: 114 MMHG

## 2023-08-11 DIAGNOSIS — M86.372 CHRONIC MULTIFOCAL OSTEOMYELITIS OF LEFT FOOT: Primary | ICD-10-CM

## 2023-08-11 PROCEDURE — 25010000002 ERTAPENEM PER 500 MG: Performed by: FAMILY MEDICINE

## 2023-08-11 RX ORDER — SODIUM CHLORIDE 9 MG/ML
250 INJECTION, SOLUTION INTRAVENOUS ONCE
Status: CANCELLED | OUTPATIENT
Start: 2023-08-11

## 2023-08-11 RX ORDER — SODIUM CHLORIDE 9 MG/ML
250 INJECTION, SOLUTION INTRAVENOUS ONCE
Status: COMPLETED | OUTPATIENT
Start: 2023-08-11 | End: 2023-08-11

## 2023-08-11 RX ADMIN — ERTAPENEM SODIUM 1000 MG: 1 INJECTION, POWDER, LYOPHILIZED, FOR SOLUTION INTRAMUSCULAR; INTRAVENOUS at 09:30

## 2023-08-11 RX ADMIN — SODIUM CHLORIDE 250 ML: 9 INJECTION, SOLUTION INTRAVENOUS at 09:30

## 2023-08-12 ENCOUNTER — INFUSION (OUTPATIENT)
Dept: ONCOLOGY | Facility: HOSPITAL | Age: 63
End: 2023-08-12
Payer: MEDICARE

## 2023-08-12 VITALS
SYSTOLIC BLOOD PRESSURE: 119 MMHG | RESPIRATION RATE: 20 BRPM | HEART RATE: 70 BPM | TEMPERATURE: 96.9 F | DIASTOLIC BLOOD PRESSURE: 74 MMHG

## 2023-08-12 DIAGNOSIS — M86.372 CHRONIC MULTIFOCAL OSTEOMYELITIS OF LEFT FOOT: Primary | ICD-10-CM

## 2023-08-12 PROCEDURE — 25010000002 ERTAPENEM PER 500 MG: Performed by: FAMILY MEDICINE

## 2023-08-12 RX ORDER — SODIUM CHLORIDE 0.9 % (FLUSH) 0.9 %
20 SYRINGE (ML) INJECTION AS NEEDED
Status: CANCELLED | OUTPATIENT
Start: 2023-08-12

## 2023-08-12 RX ORDER — SODIUM CHLORIDE 9 MG/ML
250 INJECTION, SOLUTION INTRAVENOUS ONCE
Status: CANCELLED | OUTPATIENT
Start: 2023-08-12

## 2023-08-12 RX ORDER — SODIUM CHLORIDE 9 MG/ML
250 INJECTION, SOLUTION INTRAVENOUS ONCE
Status: COMPLETED | OUTPATIENT
Start: 2023-08-12 | End: 2023-08-12

## 2023-08-12 RX ORDER — SODIUM CHLORIDE 0.9 % (FLUSH) 0.9 %
10 SYRINGE (ML) INJECTION AS NEEDED
Status: DISCONTINUED | OUTPATIENT
Start: 2023-08-12 | End: 2023-08-12 | Stop reason: HOSPADM

## 2023-08-12 RX ORDER — SODIUM CHLORIDE 0.9 % (FLUSH) 0.9 %
10 SYRINGE (ML) INJECTION AS NEEDED
Status: CANCELLED | OUTPATIENT
Start: 2023-08-12

## 2023-08-12 RX ADMIN — ERTAPENEM SODIUM 1000 MG: 1 INJECTION, POWDER, LYOPHILIZED, FOR SOLUTION INTRAMUSCULAR; INTRAVENOUS at 08:13

## 2023-08-12 RX ADMIN — SODIUM CHLORIDE 250 ML: 9 INJECTION, SOLUTION INTRAVENOUS at 08:10

## 2023-08-13 ENCOUNTER — INFUSION (OUTPATIENT)
Dept: ONCOLOGY | Facility: HOSPITAL | Age: 63
End: 2023-08-13
Payer: MEDICARE

## 2023-08-13 VITALS
TEMPERATURE: 97.2 F | RESPIRATION RATE: 18 BRPM | SYSTOLIC BLOOD PRESSURE: 130 MMHG | DIASTOLIC BLOOD PRESSURE: 84 MMHG | HEART RATE: 71 BPM

## 2023-08-13 DIAGNOSIS — M86.372 CHRONIC MULTIFOCAL OSTEOMYELITIS OF LEFT FOOT: Primary | ICD-10-CM

## 2023-08-13 PROCEDURE — 25010000002 ERTAPENEM PER 500 MG: Performed by: FAMILY MEDICINE

## 2023-08-13 RX ORDER — SODIUM CHLORIDE 9 MG/ML
250 INJECTION, SOLUTION INTRAVENOUS ONCE
Status: CANCELLED | OUTPATIENT
Start: 2023-08-13

## 2023-08-13 RX ORDER — SODIUM CHLORIDE 0.9 % (FLUSH) 0.9 %
20 SYRINGE (ML) INJECTION AS NEEDED
OUTPATIENT
Start: 2023-08-13

## 2023-08-13 RX ORDER — SODIUM CHLORIDE 9 MG/ML
250 INJECTION, SOLUTION INTRAVENOUS ONCE
Status: COMPLETED | OUTPATIENT
Start: 2023-08-13 | End: 2023-08-13

## 2023-08-13 RX ORDER — SODIUM CHLORIDE 0.9 % (FLUSH) 0.9 %
10 SYRINGE (ML) INJECTION AS NEEDED
Status: DISCONTINUED | OUTPATIENT
Start: 2023-08-13 | End: 2023-08-13 | Stop reason: HOSPADM

## 2023-08-13 RX ORDER — SODIUM CHLORIDE 0.9 % (FLUSH) 0.9 %
10 SYRINGE (ML) INJECTION AS NEEDED
OUTPATIENT
Start: 2023-08-13

## 2023-08-13 RX ADMIN — SODIUM CHLORIDE 250 ML: 9 INJECTION, SOLUTION INTRAVENOUS at 08:13

## 2023-08-13 RX ADMIN — ERTAPENEM SODIUM 1000 MG: 1 INJECTION, POWDER, LYOPHILIZED, FOR SOLUTION INTRAMUSCULAR; INTRAVENOUS at 08:13

## 2023-08-14 ENCOUNTER — INFUSION (OUTPATIENT)
Dept: ONCOLOGY | Facility: HOSPITAL | Age: 63
End: 2023-08-14
Payer: MEDICARE

## 2023-08-14 VITALS
SYSTOLIC BLOOD PRESSURE: 154 MMHG | TEMPERATURE: 96.9 F | RESPIRATION RATE: 18 BRPM | HEART RATE: 76 BPM | DIASTOLIC BLOOD PRESSURE: 83 MMHG

## 2023-08-14 DIAGNOSIS — M86.372 CHRONIC MULTIFOCAL OSTEOMYELITIS OF LEFT FOOT: Primary | ICD-10-CM

## 2023-08-14 PROCEDURE — 25010000002 ERTAPENEM PER 500 MG: Performed by: FAMILY MEDICINE

## 2023-08-14 RX ORDER — SODIUM CHLORIDE 9 MG/ML
250 INJECTION, SOLUTION INTRAVENOUS ONCE
Status: CANCELLED | OUTPATIENT
Start: 2023-08-15

## 2023-08-14 RX ORDER — SODIUM CHLORIDE 9 MG/ML
250 INJECTION, SOLUTION INTRAVENOUS ONCE
Status: COMPLETED | OUTPATIENT
Start: 2023-08-14 | End: 2023-08-14

## 2023-08-14 RX ADMIN — ERTAPENEM SODIUM 1000 MG: 1 INJECTION, POWDER, LYOPHILIZED, FOR SOLUTION INTRAMUSCULAR; INTRAVENOUS at 10:53

## 2023-08-14 RX ADMIN — SODIUM CHLORIDE 250 ML: 900 INJECTION INTRAVENOUS at 10:53

## 2023-08-15 ENCOUNTER — INFUSION (OUTPATIENT)
Dept: ONCOLOGY | Facility: HOSPITAL | Age: 63
End: 2023-08-15
Payer: MEDICARE

## 2023-08-15 VITALS
TEMPERATURE: 97.9 F | SYSTOLIC BLOOD PRESSURE: 148 MMHG | RESPIRATION RATE: 18 BRPM | HEART RATE: 78 BPM | DIASTOLIC BLOOD PRESSURE: 73 MMHG

## 2023-08-15 DIAGNOSIS — M86.372 CHRONIC MULTIFOCAL OSTEOMYELITIS OF LEFT FOOT: Primary | ICD-10-CM

## 2023-08-15 PROCEDURE — 25010000002 ERTAPENEM PER 500 MG: Performed by: FAMILY MEDICINE

## 2023-08-15 RX ORDER — SODIUM CHLORIDE 9 MG/ML
250 INJECTION, SOLUTION INTRAVENOUS ONCE
Status: CANCELLED | OUTPATIENT
Start: 2023-08-16

## 2023-08-15 RX ORDER — SODIUM CHLORIDE 9 MG/ML
250 INJECTION, SOLUTION INTRAVENOUS ONCE
Status: COMPLETED | OUTPATIENT
Start: 2023-08-15 | End: 2023-08-15

## 2023-08-15 RX ADMIN — ERTAPENEM SODIUM 1000 MG: 1 INJECTION, POWDER, LYOPHILIZED, FOR SOLUTION INTRAMUSCULAR; INTRAVENOUS at 10:14

## 2023-08-15 RX ADMIN — SODIUM CHLORIDE 250 ML: 900 INJECTION INTRAVENOUS at 10:13

## 2023-08-16 ENCOUNTER — INFUSION (OUTPATIENT)
Dept: ONCOLOGY | Facility: HOSPITAL | Age: 63
End: 2023-08-16
Payer: MEDICARE

## 2023-08-16 VITALS
HEART RATE: 77 BPM | DIASTOLIC BLOOD PRESSURE: 75 MMHG | TEMPERATURE: 96.8 F | SYSTOLIC BLOOD PRESSURE: 146 MMHG | RESPIRATION RATE: 18 BRPM

## 2023-08-16 DIAGNOSIS — M86.372 CHRONIC MULTIFOCAL OSTEOMYELITIS OF LEFT FOOT: Primary | ICD-10-CM

## 2023-08-16 PROCEDURE — 25010000002 ERTAPENEM PER 500 MG: Performed by: FAMILY MEDICINE

## 2023-08-16 RX ORDER — SODIUM CHLORIDE 9 MG/ML
250 INJECTION, SOLUTION INTRAVENOUS ONCE
Status: CANCELLED | OUTPATIENT
Start: 2023-08-17

## 2023-08-16 RX ADMIN — ERTAPENEM SODIUM 1000 MG: 1 INJECTION, POWDER, LYOPHILIZED, FOR SOLUTION INTRAMUSCULAR; INTRAVENOUS at 11:36

## 2023-08-17 ENCOUNTER — INFUSION (OUTPATIENT)
Dept: ONCOLOGY | Facility: HOSPITAL | Age: 63
End: 2023-08-17
Payer: MEDICARE

## 2023-08-17 VITALS
SYSTOLIC BLOOD PRESSURE: 117 MMHG | DIASTOLIC BLOOD PRESSURE: 66 MMHG | HEART RATE: 76 BPM | RESPIRATION RATE: 18 BRPM | TEMPERATURE: 96.9 F

## 2023-08-17 DIAGNOSIS — M86.372 CHRONIC MULTIFOCAL OSTEOMYELITIS OF LEFT FOOT: Primary | ICD-10-CM

## 2023-08-17 PROCEDURE — 25010000002 ERTAPENEM PER 500 MG: Performed by: FAMILY MEDICINE

## 2023-08-17 RX ORDER — SODIUM CHLORIDE 9 MG/ML
250 INJECTION, SOLUTION INTRAVENOUS ONCE
Status: COMPLETED | OUTPATIENT
Start: 2023-08-17 | End: 2023-08-17

## 2023-08-17 RX ORDER — SODIUM CHLORIDE 9 MG/ML
250 INJECTION, SOLUTION INTRAVENOUS ONCE
Status: CANCELLED | OUTPATIENT
Start: 2023-08-18

## 2023-08-17 RX ADMIN — ERTAPENEM SODIUM 1000 MG: 1 INJECTION, POWDER, LYOPHILIZED, FOR SOLUTION INTRAMUSCULAR; INTRAVENOUS at 10:18

## 2023-08-17 RX ADMIN — SODIUM CHLORIDE 250 ML: 9 INJECTION, SOLUTION INTRAVENOUS at 10:18

## 2023-08-18 ENCOUNTER — INFUSION (OUTPATIENT)
Dept: ONCOLOGY | Facility: HOSPITAL | Age: 63
End: 2023-08-18
Payer: MEDICARE

## 2023-08-18 VITALS
HEART RATE: 70 BPM | TEMPERATURE: 97.3 F | RESPIRATION RATE: 20 BRPM | DIASTOLIC BLOOD PRESSURE: 73 MMHG | SYSTOLIC BLOOD PRESSURE: 137 MMHG

## 2023-08-18 DIAGNOSIS — M86.372 CHRONIC MULTIFOCAL OSTEOMYELITIS OF LEFT FOOT: Primary | ICD-10-CM

## 2023-08-18 PROCEDURE — 25010000002 ERTAPENEM PER 500 MG: Performed by: FAMILY MEDICINE

## 2023-08-18 RX ORDER — SODIUM CHLORIDE 9 MG/ML
250 INJECTION, SOLUTION INTRAVENOUS ONCE
Status: CANCELLED | OUTPATIENT
Start: 2023-08-18

## 2023-08-18 RX ORDER — SODIUM CHLORIDE 9 MG/ML
250 INJECTION, SOLUTION INTRAVENOUS ONCE
Status: COMPLETED | OUTPATIENT
Start: 2023-08-18 | End: 2023-08-18

## 2023-08-18 RX ADMIN — ERTAPENEM SODIUM 1000 MG: 1 INJECTION, POWDER, LYOPHILIZED, FOR SOLUTION INTRAMUSCULAR; INTRAVENOUS at 10:18

## 2023-08-18 RX ADMIN — SODIUM CHLORIDE 250 ML: 9 INJECTION, SOLUTION INTRAVENOUS at 10:18

## 2023-08-19 ENCOUNTER — INFUSION (OUTPATIENT)
Dept: ONCOLOGY | Facility: HOSPITAL | Age: 63
End: 2023-08-19
Payer: MEDICARE

## 2023-08-19 VITALS
DIASTOLIC BLOOD PRESSURE: 63 MMHG | SYSTOLIC BLOOD PRESSURE: 126 MMHG | RESPIRATION RATE: 18 BRPM | HEART RATE: 74 BPM | OXYGEN SATURATION: 97 % | TEMPERATURE: 96.7 F

## 2023-08-19 DIAGNOSIS — M86.372 CHRONIC MULTIFOCAL OSTEOMYELITIS OF LEFT FOOT: Primary | ICD-10-CM

## 2023-08-19 PROCEDURE — 25010000002 ERTAPENEM PER 500 MG: Performed by: FAMILY MEDICINE

## 2023-08-19 RX ORDER — SODIUM CHLORIDE 9 MG/ML
250 INJECTION, SOLUTION INTRAVENOUS ONCE
Status: COMPLETED | OUTPATIENT
Start: 2023-08-19 | End: 2023-08-19

## 2023-08-19 RX ORDER — SODIUM CHLORIDE 9 MG/ML
250 INJECTION, SOLUTION INTRAVENOUS ONCE
Status: CANCELLED | OUTPATIENT
Start: 2023-08-19

## 2023-08-19 RX ADMIN — SODIUM CHLORIDE 250 ML: 9 INJECTION, SOLUTION INTRAVENOUS at 08:25

## 2023-08-19 RX ADMIN — ERTAPENEM SODIUM 1000 MG: 1 INJECTION, POWDER, LYOPHILIZED, FOR SOLUTION INTRAMUSCULAR; INTRAVENOUS at 08:32

## 2023-08-20 ENCOUNTER — INFUSION (OUTPATIENT)
Dept: ONCOLOGY | Facility: HOSPITAL | Age: 63
End: 2023-08-20
Payer: MEDICARE

## 2023-08-20 VITALS
OXYGEN SATURATION: 96 % | DIASTOLIC BLOOD PRESSURE: 71 MMHG | RESPIRATION RATE: 18 BRPM | SYSTOLIC BLOOD PRESSURE: 96 MMHG | TEMPERATURE: 97.2 F | HEART RATE: 78 BPM

## 2023-08-20 DIAGNOSIS — M86.372 CHRONIC MULTIFOCAL OSTEOMYELITIS OF LEFT FOOT: Primary | ICD-10-CM

## 2023-08-20 PROCEDURE — 25010000002 ERTAPENEM PER 500 MG: Performed by: FAMILY MEDICINE

## 2023-08-20 RX ORDER — SODIUM CHLORIDE 9 MG/ML
250 INJECTION, SOLUTION INTRAVENOUS ONCE
Status: COMPLETED | OUTPATIENT
Start: 2023-08-20 | End: 2023-08-20

## 2023-08-20 RX ORDER — SODIUM CHLORIDE 0.9 % (FLUSH) 0.9 %
20 SYRINGE (ML) INJECTION AS NEEDED
Status: DISCONTINUED | OUTPATIENT
Start: 2023-08-20 | End: 2023-08-20 | Stop reason: HOSPADM

## 2023-08-20 RX ORDER — SODIUM CHLORIDE 0.9 % (FLUSH) 0.9 %
10 SYRINGE (ML) INJECTION AS NEEDED
OUTPATIENT
Start: 2023-08-20

## 2023-08-20 RX ORDER — SODIUM CHLORIDE 9 MG/ML
250 INJECTION, SOLUTION INTRAVENOUS ONCE
Status: CANCELLED | OUTPATIENT
Start: 2023-08-20

## 2023-08-20 RX ORDER — SODIUM CHLORIDE 0.9 % (FLUSH) 0.9 %
20 SYRINGE (ML) INJECTION AS NEEDED
OUTPATIENT
Start: 2023-08-20

## 2023-08-20 RX ORDER — SODIUM CHLORIDE 0.9 % (FLUSH) 0.9 %
10 SYRINGE (ML) INJECTION AS NEEDED
Status: DISCONTINUED | OUTPATIENT
Start: 2023-08-20 | End: 2023-08-20 | Stop reason: HOSPADM

## 2023-08-20 RX ADMIN — Medication 20 ML: at 08:53

## 2023-08-20 RX ADMIN — SODIUM CHLORIDE 250 ML: 9 INJECTION, SOLUTION INTRAVENOUS at 08:08

## 2023-08-20 RX ADMIN — ERTAPENEM SODIUM 1000 MG: 1 INJECTION, POWDER, LYOPHILIZED, FOR SOLUTION INTRAMUSCULAR; INTRAVENOUS at 08:06

## 2023-08-20 RX ADMIN — Medication 10 ML: at 08:05

## 2023-08-21 ENCOUNTER — INFUSION (OUTPATIENT)
Dept: ONCOLOGY | Facility: HOSPITAL | Age: 63
End: 2023-08-21
Payer: MEDICARE

## 2023-08-21 VITALS
SYSTOLIC BLOOD PRESSURE: 111 MMHG | HEART RATE: 80 BPM | RESPIRATION RATE: 18 BRPM | DIASTOLIC BLOOD PRESSURE: 65 MMHG | TEMPERATURE: 97.6 F

## 2023-08-21 DIAGNOSIS — M86.372 CHRONIC MULTIFOCAL OSTEOMYELITIS OF LEFT FOOT: Primary | ICD-10-CM

## 2023-08-21 PROCEDURE — 25010000002 ERTAPENEM PER 500 MG: Performed by: FAMILY MEDICINE

## 2023-08-21 PROCEDURE — 96365 THER/PROPH/DIAG IV INF INIT: CPT | Performed by: NURSE PRACTITIONER

## 2023-08-21 RX ORDER — SODIUM CHLORIDE 9 MG/ML
250 INJECTION, SOLUTION INTRAVENOUS ONCE
Status: CANCELLED | OUTPATIENT
Start: 2023-08-22

## 2023-08-21 RX ORDER — SODIUM CHLORIDE 9 MG/ML
250 INJECTION, SOLUTION INTRAVENOUS ONCE
Status: COMPLETED | OUTPATIENT
Start: 2023-08-21 | End: 2023-08-21

## 2023-08-21 RX ADMIN — ERTAPENEM SODIUM 1000 MG: 1 INJECTION, POWDER, LYOPHILIZED, FOR SOLUTION INTRAMUSCULAR; INTRAVENOUS at 10:59

## 2023-08-21 RX ADMIN — SODIUM CHLORIDE 250 ML: 900 INJECTION INTRAVENOUS at 10:59

## 2023-08-22 ENCOUNTER — TELEPHONE (OUTPATIENT)
Dept: ONCOLOGY | Facility: HOSPITAL | Age: 63
End: 2023-08-22
Payer: MEDICARE

## 2023-08-22 ENCOUNTER — INFUSION (OUTPATIENT)
Dept: ONCOLOGY | Facility: HOSPITAL | Age: 63
End: 2023-08-22
Payer: MEDICARE

## 2023-08-22 VITALS
DIASTOLIC BLOOD PRESSURE: 73 MMHG | HEART RATE: 82 BPM | SYSTOLIC BLOOD PRESSURE: 114 MMHG | RESPIRATION RATE: 18 BRPM | TEMPERATURE: 97.7 F

## 2023-08-22 DIAGNOSIS — M86.372 CHRONIC MULTIFOCAL OSTEOMYELITIS OF LEFT FOOT: Primary | ICD-10-CM

## 2023-08-22 PROCEDURE — 25010000002 ERTAPENEM PER 500 MG: Performed by: FAMILY MEDICINE

## 2023-08-22 PROCEDURE — 96365 THER/PROPH/DIAG IV INF INIT: CPT | Performed by: NURSE PRACTITIONER

## 2023-08-22 RX ORDER — SODIUM CHLORIDE 0.9 % (FLUSH) 0.9 %
20 SYRINGE (ML) INJECTION AS NEEDED
Status: DISCONTINUED | OUTPATIENT
Start: 2023-08-22 | End: 2023-08-22 | Stop reason: HOSPADM

## 2023-08-22 RX ORDER — SODIUM CHLORIDE 0.9 % (FLUSH) 0.9 %
10 SYRINGE (ML) INJECTION AS NEEDED
OUTPATIENT
Start: 2023-08-22

## 2023-08-22 RX ORDER — SODIUM CHLORIDE 0.9 % (FLUSH) 0.9 %
20 SYRINGE (ML) INJECTION AS NEEDED
OUTPATIENT
Start: 2023-08-22

## 2023-08-22 RX ORDER — SODIUM CHLORIDE 9 MG/ML
250 INJECTION, SOLUTION INTRAVENOUS ONCE
Status: COMPLETED | OUTPATIENT
Start: 2023-08-22 | End: 2023-08-22

## 2023-08-22 RX ORDER — SODIUM CHLORIDE 0.9 % (FLUSH) 0.9 %
10 SYRINGE (ML) INJECTION AS NEEDED
Status: DISCONTINUED | OUTPATIENT
Start: 2023-08-22 | End: 2023-08-22 | Stop reason: HOSPADM

## 2023-08-22 RX ORDER — SODIUM CHLORIDE 9 MG/ML
250 INJECTION, SOLUTION INTRAVENOUS ONCE
Status: CANCELLED | OUTPATIENT
Start: 2023-08-23

## 2023-08-22 RX ADMIN — SODIUM CHLORIDE 250 ML: 900 INJECTION INTRAVENOUS at 09:34

## 2023-08-22 RX ADMIN — ERTAPENEM SODIUM 1000 MG: 1 INJECTION, POWDER, LYOPHILIZED, FOR SOLUTION INTRAMUSCULAR; INTRAVENOUS at 09:34

## 2023-08-22 NOTE — TELEPHONE ENCOUNTER
Attempted to call IV abx ordering provider's office regarding PICC line removal orders. He scheduled for his last IV abx on 8/31. Left VM for them to call us back.

## 2023-08-23 ENCOUNTER — INFUSION (OUTPATIENT)
Dept: ONCOLOGY | Facility: HOSPITAL | Age: 63
End: 2023-08-23
Payer: MEDICARE

## 2023-08-23 ENCOUNTER — DOCUMENTATION (OUTPATIENT)
Dept: ONCOLOGY | Facility: CLINIC | Age: 63
End: 2023-08-23
Payer: MEDICARE

## 2023-08-23 ENCOUNTER — TELEPHONE (OUTPATIENT)
Dept: ONCOLOGY | Facility: CLINIC | Age: 63
End: 2023-08-23
Payer: MEDICARE

## 2023-08-23 ENCOUNTER — TELEPHONE (OUTPATIENT)
Dept: ONCOLOGY | Facility: HOSPITAL | Age: 63
End: 2023-08-23
Payer: MEDICARE

## 2023-08-23 VITALS
DIASTOLIC BLOOD PRESSURE: 68 MMHG | HEART RATE: 83 BPM | TEMPERATURE: 98.3 F | SYSTOLIC BLOOD PRESSURE: 113 MMHG | RESPIRATION RATE: 18 BRPM

## 2023-08-23 DIAGNOSIS — M86.372 CHRONIC MULTIFOCAL OSTEOMYELITIS OF LEFT FOOT: Primary | ICD-10-CM

## 2023-08-23 PROCEDURE — 25010000002 ERTAPENEM PER 500 MG: Performed by: FAMILY MEDICINE

## 2023-08-23 RX ORDER — SODIUM CHLORIDE 9 MG/ML
250 INJECTION, SOLUTION INTRAVENOUS ONCE
Status: COMPLETED | OUTPATIENT
Start: 2023-08-23 | End: 2023-08-23

## 2023-08-23 RX ORDER — SODIUM CHLORIDE 9 MG/ML
250 INJECTION, SOLUTION INTRAVENOUS ONCE
Status: CANCELLED | OUTPATIENT
Start: 2023-08-24

## 2023-08-23 RX ADMIN — SODIUM CHLORIDE 250 ML: 9 INJECTION, SOLUTION INTRAVENOUS at 09:41

## 2023-08-23 RX ADMIN — ERTAPENEM SODIUM 1000 MG: 1 INJECTION, POWDER, LYOPHILIZED, FOR SOLUTION INTRAMUSCULAR; INTRAVENOUS at 09:41

## 2023-08-23 NOTE — TELEPHONE ENCOUNTER
Reason for call: Estephania with [facility] Infect[facility] called - requesting most recent lab reports on patient to be faxed to [phone]    The reason is related to: Requesting lab reports    Best phone number to return call:   Fax number: [phone]  Estephania: [phone]

## 2023-08-23 NOTE — TELEPHONE ENCOUNTER
[facility] who is seeing patient and ordered PICC line states that provider would like for PICC to remain in place until after patient sees infectious disease physician. Will call back if anything changes.

## 2023-08-24 ENCOUNTER — INFUSION (OUTPATIENT)
Dept: ONCOLOGY | Facility: HOSPITAL | Age: 63
End: 2023-08-24
Payer: MEDICARE

## 2023-08-24 VITALS
DIASTOLIC BLOOD PRESSURE: 71 MMHG | SYSTOLIC BLOOD PRESSURE: 115 MMHG | RESPIRATION RATE: 18 BRPM | HEART RATE: 82 BPM | TEMPERATURE: 98.3 F

## 2023-08-24 DIAGNOSIS — M86.372 CHRONIC MULTIFOCAL OSTEOMYELITIS OF LEFT FOOT: Primary | ICD-10-CM

## 2023-08-24 PROCEDURE — 25010000002 ERTAPENEM PER 500 MG: Performed by: FAMILY MEDICINE

## 2023-08-24 RX ORDER — SODIUM CHLORIDE 9 MG/ML
250 INJECTION, SOLUTION INTRAVENOUS ONCE
Status: CANCELLED | OUTPATIENT
Start: 2023-08-25

## 2023-08-24 RX ORDER — SODIUM CHLORIDE 9 MG/ML
250 INJECTION, SOLUTION INTRAVENOUS ONCE
Status: COMPLETED | OUTPATIENT
Start: 2023-08-24 | End: 2023-08-24

## 2023-08-24 RX ADMIN — ERTAPENEM SODIUM 1000 MG: 1 INJECTION, POWDER, LYOPHILIZED, FOR SOLUTION INTRAMUSCULAR; INTRAVENOUS at 11:18

## 2023-08-24 RX ADMIN — SODIUM CHLORIDE 250 ML: 9 INJECTION, SOLUTION INTRAVENOUS at 11:19

## 2023-08-25 ENCOUNTER — INFUSION (OUTPATIENT)
Dept: ONCOLOGY | Facility: HOSPITAL | Age: 63
End: 2023-08-25
Payer: MEDICARE

## 2023-08-25 VITALS
DIASTOLIC BLOOD PRESSURE: 71 MMHG | HEART RATE: 79 BPM | RESPIRATION RATE: 20 BRPM | TEMPERATURE: 98.2 F | SYSTOLIC BLOOD PRESSURE: 115 MMHG

## 2023-08-25 DIAGNOSIS — M86.372 CHRONIC MULTIFOCAL OSTEOMYELITIS OF LEFT FOOT: Primary | ICD-10-CM

## 2023-08-25 PROCEDURE — 25010000002 ERTAPENEM PER 500 MG: Performed by: FAMILY MEDICINE

## 2023-08-25 RX ORDER — SODIUM CHLORIDE 9 MG/ML
250 INJECTION, SOLUTION INTRAVENOUS ONCE
Status: CANCELLED | OUTPATIENT
Start: 2023-08-25

## 2023-08-25 RX ORDER — SODIUM CHLORIDE 9 MG/ML
250 INJECTION, SOLUTION INTRAVENOUS ONCE
Status: COMPLETED | OUTPATIENT
Start: 2023-08-25 | End: 2023-08-25

## 2023-08-25 RX ADMIN — ERTAPENEM SODIUM 1000 MG: 1 INJECTION, POWDER, LYOPHILIZED, FOR SOLUTION INTRAMUSCULAR; INTRAVENOUS at 10:13

## 2023-08-25 RX ADMIN — SODIUM CHLORIDE 250 ML: 9 INJECTION, SOLUTION INTRAVENOUS at 10:13

## 2023-08-26 ENCOUNTER — INFUSION (OUTPATIENT)
Dept: ONCOLOGY | Facility: HOSPITAL | Age: 63
End: 2023-08-26
Payer: MEDICARE

## 2023-08-26 VITALS
SYSTOLIC BLOOD PRESSURE: 131 MMHG | RESPIRATION RATE: 20 BRPM | TEMPERATURE: 97.6 F | DIASTOLIC BLOOD PRESSURE: 71 MMHG | HEART RATE: 80 BPM

## 2023-08-26 DIAGNOSIS — M86.372 CHRONIC MULTIFOCAL OSTEOMYELITIS OF LEFT FOOT: Primary | ICD-10-CM

## 2023-08-26 PROCEDURE — 25010000002 ERTAPENEM PER 500 MG: Performed by: FAMILY MEDICINE

## 2023-08-26 RX ORDER — SODIUM CHLORIDE 9 MG/ML
250 INJECTION, SOLUTION INTRAVENOUS ONCE
Status: CANCELLED | OUTPATIENT
Start: 2023-08-26

## 2023-08-26 RX ORDER — SODIUM CHLORIDE 9 MG/ML
250 INJECTION, SOLUTION INTRAVENOUS ONCE
Status: COMPLETED | OUTPATIENT
Start: 2023-08-26 | End: 2023-08-26

## 2023-08-26 RX ADMIN — SODIUM CHLORIDE 250 ML: 900 INJECTION INTRAVENOUS at 10:44

## 2023-08-26 RX ADMIN — ERTAPENEM SODIUM 1000 MG: 1 INJECTION, POWDER, LYOPHILIZED, FOR SOLUTION INTRAMUSCULAR; INTRAVENOUS at 10:44

## 2023-08-27 ENCOUNTER — INFUSION (OUTPATIENT)
Dept: ONCOLOGY | Facility: HOSPITAL | Age: 63
End: 2023-08-27
Payer: MEDICARE

## 2023-08-27 VITALS
SYSTOLIC BLOOD PRESSURE: 139 MMHG | DIASTOLIC BLOOD PRESSURE: 74 MMHG | RESPIRATION RATE: 20 BRPM | HEART RATE: 78 BPM | TEMPERATURE: 97.4 F

## 2023-08-27 DIAGNOSIS — M86.372 CHRONIC MULTIFOCAL OSTEOMYELITIS OF LEFT FOOT: Primary | ICD-10-CM

## 2023-08-27 PROCEDURE — 25010000002 ERTAPENEM PER 500 MG: Performed by: FAMILY MEDICINE

## 2023-08-27 RX ORDER — SODIUM CHLORIDE 9 MG/ML
250 INJECTION, SOLUTION INTRAVENOUS ONCE
Status: COMPLETED | OUTPATIENT
Start: 2023-08-27 | End: 2023-08-27

## 2023-08-27 RX ORDER — SODIUM CHLORIDE 9 MG/ML
250 INJECTION, SOLUTION INTRAVENOUS ONCE
Status: CANCELLED | OUTPATIENT
Start: 2023-08-28

## 2023-08-27 RX ADMIN — SODIUM CHLORIDE 250 ML: 900 INJECTION INTRAVENOUS at 12:19

## 2023-08-27 RX ADMIN — ERTAPENEM SODIUM 1000 MG: 1 INJECTION, POWDER, LYOPHILIZED, FOR SOLUTION INTRAMUSCULAR; INTRAVENOUS at 12:19

## 2023-08-28 ENCOUNTER — INFUSION (OUTPATIENT)
Dept: ONCOLOGY | Facility: HOSPITAL | Age: 63
End: 2023-08-28
Payer: MEDICARE

## 2023-08-28 ENCOUNTER — DOCUMENTATION (OUTPATIENT)
Dept: NUTRITION | Facility: HOSPITAL | Age: 63
End: 2023-08-28
Payer: MEDICARE

## 2023-08-28 VITALS — SYSTOLIC BLOOD PRESSURE: 129 MMHG | DIASTOLIC BLOOD PRESSURE: 75 MMHG | HEART RATE: 85 BPM | TEMPERATURE: 96.8 F

## 2023-08-28 DIAGNOSIS — M86.372 CHRONIC MULTIFOCAL OSTEOMYELITIS OF LEFT FOOT: Primary | ICD-10-CM

## 2023-08-28 PROCEDURE — 96365 THER/PROPH/DIAG IV INF INIT: CPT | Performed by: NURSE PRACTITIONER

## 2023-08-28 PROCEDURE — 25010000002 ERTAPENEM PER 500 MG: Performed by: FAMILY MEDICINE

## 2023-08-28 RX ORDER — SODIUM CHLORIDE 9 MG/ML
250 INJECTION, SOLUTION INTRAVENOUS ONCE
Status: COMPLETED | OUTPATIENT
Start: 2023-08-28 | End: 2023-08-28

## 2023-08-28 RX ORDER — SODIUM CHLORIDE 9 MG/ML
250 INJECTION, SOLUTION INTRAVENOUS ONCE
Status: CANCELLED | OUTPATIENT
Start: 2023-08-29

## 2023-08-28 RX ADMIN — ERTAPENEM SODIUM 1000 MG: 1 INJECTION, POWDER, LYOPHILIZED, FOR SOLUTION INTRAMUSCULAR; INTRAVENOUS at 10:23

## 2023-08-28 RX ADMIN — SODIUM CHLORIDE 250 ML: 9 INJECTION, SOLUTION INTRAVENOUS at 10:23

## 2023-08-28 NOTE — PROGRESS NOTES
Adult Outpatient Nutrition  Assessment    Patient Name:  Emre Lisa  YOB: 1960  MRN: [id number]    Assessment Date:  Entry from 8/23/2023    Comments:  Pleasant 63WM coming to [facility] for IV antibotics due to recent sepsis/enterobacter bacteremia. Chronic left foot ulcer/osteomyelitis/charcot joint. Diabetic. Pt being followed by infectious disease and endocrinology. Ht 76 in. Wt 320 lb. BMI 38.97. A1C 6.7. Pt states intake and glucose good at home. No nutrition needs verbalized at this time. Pt agreed to call on RDN as needed.                     Electronically signed by:  Yanni Avilez RD  08/28/23 10:13 CDT

## 2023-08-29 ENCOUNTER — INFUSION (OUTPATIENT)
Dept: ONCOLOGY | Facility: HOSPITAL | Age: 63
End: 2023-08-29
Payer: MEDICARE

## 2023-08-29 DIAGNOSIS — M86.372 CHRONIC MULTIFOCAL OSTEOMYELITIS OF LEFT FOOT: Primary | ICD-10-CM

## 2023-08-29 PROCEDURE — 25010000002 ERTAPENEM PER 500 MG: Performed by: FAMILY MEDICINE

## 2023-08-29 RX ORDER — SODIUM CHLORIDE 9 MG/ML
250 INJECTION, SOLUTION INTRAVENOUS ONCE
Status: CANCELLED | OUTPATIENT
Start: 2023-08-29

## 2023-08-29 RX ORDER — SODIUM CHLORIDE 9 MG/ML
250 INJECTION, SOLUTION INTRAVENOUS ONCE
Status: COMPLETED | OUTPATIENT
Start: 2023-08-29 | End: 2023-08-29

## 2023-08-29 RX ADMIN — SODIUM CHLORIDE 250 ML: 9 INJECTION, SOLUTION INTRAVENOUS at 11:26

## 2023-08-29 RX ADMIN — ERTAPENEM SODIUM 1000 MG: 1 INJECTION, POWDER, LYOPHILIZED, FOR SOLUTION INTRAMUSCULAR; INTRAVENOUS at 11:26

## 2023-08-30 ENCOUNTER — OFFICE VISIT (OUTPATIENT)
Dept: PODIATRY | Facility: CLINIC | Age: 63
End: 2023-08-30
Payer: MEDICARE

## 2023-08-30 ENCOUNTER — INFUSION (OUTPATIENT)
Dept: ONCOLOGY | Facility: HOSPITAL | Age: 63
End: 2023-08-30
Payer: MEDICARE

## 2023-08-30 VITALS
HEIGHT: 76 IN | BODY MASS INDEX: 38.36 KG/M2 | DIASTOLIC BLOOD PRESSURE: 88 MMHG | HEART RATE: 71 BPM | SYSTOLIC BLOOD PRESSURE: 132 MMHG | WEIGHT: 315 LBS | OXYGEN SATURATION: 99 %

## 2023-08-30 VITALS
TEMPERATURE: 97.8 F | HEART RATE: 86 BPM | RESPIRATION RATE: 18 BRPM | SYSTOLIC BLOOD PRESSURE: 117 MMHG | DIASTOLIC BLOOD PRESSURE: 68 MMHG

## 2023-08-30 DIAGNOSIS — L97.412 HEEL ULCER, RIGHT, WITH FAT LAYER EXPOSED: ICD-10-CM

## 2023-08-30 DIAGNOSIS — M14.672 CHARCOT'S JOINT OF LEFT FOOT: Primary | ICD-10-CM

## 2023-08-30 DIAGNOSIS — M86.372 CHRONIC MULTIFOCAL OSTEOMYELITIS OF LEFT FOOT: Primary | ICD-10-CM

## 2023-08-30 PROCEDURE — 25010000002 ERTAPENEM PER 500 MG: Performed by: FAMILY MEDICINE

## 2023-08-30 RX ORDER — SODIUM CHLORIDE 9 MG/ML
250 INJECTION, SOLUTION INTRAVENOUS ONCE
Status: CANCELLED | OUTPATIENT
Start: 2023-08-31

## 2023-08-30 RX ORDER — SODIUM CHLORIDE 9 MG/ML
250 INJECTION, SOLUTION INTRAVENOUS ONCE
Status: COMPLETED | OUTPATIENT
Start: 2023-08-30 | End: 2023-08-30

## 2023-08-30 RX ADMIN — ERTAPENEM SODIUM 1000 MG: 1 INJECTION, POWDER, LYOPHILIZED, FOR SOLUTION INTRAMUSCULAR; INTRAVENOUS at 13:42

## 2023-08-30 RX ADMIN — SODIUM CHLORIDE 250 ML: 9 INJECTION, SOLUTION INTRAVENOUS at 13:42

## 2023-08-30 NOTE — PROGRESS NOTES
Emre Lisa  1960  63 y.o. male  Bs:148  PCP:Bart     08/30/2023    Chief Complaint   Patient presents with    Left Foot - Pain, Follow-up       History of Present Illness    Emre Lisa is a 63 y.o.male Patient presents to clinic for left foot ulcer.Patient states he noticed this last night.       Past Medical History:   Diagnosis Date    Arthritis     Atrial fibrillation     Diabetes mellitus     Diabetic foot ulcer associated with type 2 diabetes mellitus     left great toe    Hallux malleus of left foot     Hammer toe     Hypertension     MI (myocardial infarction)     Neuropathy in diabetes     Onychomycosis     Presence of biventricular cardiac pacemaker     Rheumatoid arthritis          Past Surgical History:   Procedure Laterality Date    AMPUTATION DIGIT Right 03/05/2017    Procedure: RIGHT AMPUTATION TRANSMETATRASAL , RECESSION GASTROCNEMOUS;  Surgeon: Marco A Thompson DPM;  Location: [facility];  Service:     ANKLE FUSION Left 04/13/2023    Procedure: REDUCTION OF LEFT ANKLE DEFORMITY WITH APPLICATION OF EXTERNAL FIXATOR;  Surgeon: Marco A Thompson DPM;  Location: [facility] OR;  Service: Podiatry;  Laterality: Left;    CARDIAC CATHETERIZATION      CARDIAC DEFIBRILLATOR PLACEMENT  2010, 2016    CARPAL TUNNEL RELEASE  2015    elbow and wrist left arm    FOOT FUSION Left 05/15/2023    Procedure: REMOVAL OF EXTERNAL FIXATOR LEFT LOWER EXTREMITY WITH TIBIAL TALOCALCANEAL ARTHRODESIS  AND ALL INDICATED PROCEDURES;  Surgeon: Marco A Thompson DPM;  Location: [facility] OR;  Service: Podiatry;  Laterality: Left;    FOOT IRRIGATION, DEBRIDEMENT AND REPAIR Left 03/07/2018    Procedure: FOOT IRRIGATION, DEBRIDEMENT AND REPAIR;  Surgeon: Marco A Thompson DPM;  Location: [facility] OR;  Service:     FOOT IRRIGATION, DEBRIDEMENT AND REPAIR Left 03/10/2018    Procedure: FOOT IRRIGATION, DEBRIDEMENT AND REPAIR;  Surgeon: Marco A Thompson DPM;  Location: [facility] OR;  Service: Podiatry    FOOT WOUND CLOSURE Right  03/02/2017    Procedure: INCISION AND DRAINAGE, RIGHT FOOT;  Surgeon: Tung Rosenthal DPM;  Location: [facility] OR;  Service:     HAMMER TOE REPAIR Left 02/15/2018    Procedure: HAMMERTOE CORRECTION LEFT SECOND, THIRD AND FOURTH TOES AND HALLUX INTERPHALANGEAL JOINT ARTHRODESIS LEFT FOOT (Micro Asnis, 4.0 & 5.0 Asnis)      (c-arm);  Surgeon: Marco A Thompson DPM;  Location: [facility] OR;  Service:     HARDWARE REMOVAL Left 03/05/2018    Procedure: ANKLE/FOOT HARDWARE REMOVAL;  Surgeon: Marco A Thompson DPM;  Location: [facility] OR;  Service:     INCISION AND DRAINAGE LEG Left 03/05/2018    Procedure: INCISION AND DRAINAGE LOWER EXTREMITY;  Surgeon: Marco A Thompson DPM;  Location: [facility] OR;  Service:     PLANTAR FASCIA RELEASE Left 11/21/2019    Procedure: PLANTAR PLANING LEFT FOOT AND ALL OTHER INDICATED PROCEDURES;  Surgeon: Marco A Thompson DPM;  Location: [facility] OR;  Service: Podiatry    TOE AMPUTATION Right 2016    TONSILECTOMY, ADENOIDECTOMY, BILATERAL MYRINGOTOMY AND TUBES      age 25         Family History   Problem Relation Age of Onset    Diabetes Father     Stroke Father     No Known Problems Maternal Grandmother     No Known Problems Maternal Grandfather     No Known Problems Paternal Grandmother     No Known Problems Paternal Grandfather     No Known Problems Daughter     No Known Problems Daughter     No Known Problems Son     Heart disease Other     Hypertension Other        Allergies   Allergen Reactions    Heparin Other (See Comments)     Heparin induce thrombocytopenia     Januvia [Sitagliptin] Hives    Lipitor [Atorvastatin] Other (See Comments)     Muscle Cramps...    Shellfish Allergy Swelling and Other (See Comments)     Age 13 this occurred doesn't remember what he ate swelled lips and face    Sulfa Antibiotics Rash     Pt was age of 4 when he was told by his family he had a reaction, pt is unsure        Social History     Socioeconomic History    Marital status:    Tobacco Use    Smoking  status: Never     Passive exposure: Never    Smokeless tobacco: Never   Vaping Use    Vaping Use: Never used   Substance and Sexual Activity    Alcohol use: Yes     Comment: social, once every three months    Drug use: No    Sexual activity: Defer         Current Outpatient Medications   Medication Sig Dispense Refill    acetaminophen (TYLENOL) 325 MG tablet Take 2 tablets by mouth Every 6 (Six) Hours As Needed for Mild Pain. 60 tablet 0    apixaban (ELIQUIS) 5 MG tablet tablet Take 1 tablet by mouth Every 12 (Twelve) Hours. Indications: DVT/PE (active thrombosis) 60 tablet 0    ascorbic acid (VITAMIN C) 1000 MG tablet Take 1 tablet by mouth Every Night.      Bacillus Coagulans-Inulin (Probiotic) 1-250 BILLION-MG capsule Take 1 capsule by mouth 2 (Two) Times a Day. 30 capsule 1    Blood Glucose Monitoring Suppl (ONE TOUCH ULTRA 2) w/Device kit       Cholecalciferol 125 MCG (5000 UT) tablet Take 1 tablet by mouth Every Night.      dronedarone (MULTAQ) 400 MG tablet Take 1 tablet by mouth Every Night.      fexofenadine (ALLEGRA) 180 MG tablet Take 1 tablet by mouth Every Night.      fluticasone (FLONASE) 50 MCG/ACT nasal spray 2 sprays into the nostril(s) as directed by provider As Needed for Rhinitis.      furosemide (Lasix) 20 MG tablet Take 1 tablet by mouth Daily. 10 tablet 0    glimepiride (AMARYL) 4 MG tablet Take 1 tablet by mouth Every Night.      insulin detemir (LEVEMIR) 100 UNIT/ML injection Inject 10 Units under the skin into the appropriate area as directed Every Night. 10 mL 12    magnesium oxide (MAG-OX) 400 MG tablet Take 1 tablet by mouth 2 (Two) Times a Day.      metFORMIN (GLUCOPHAGE) 1000 MG tablet Take 1 tablet by mouth 2 (Two) Times a Day With Meals.      metoprolol succinate XL (TOPROL-XL) 25 MG 24 hr tablet Take 1 tablet by mouth 2 (Two) Times a Day. 0.5 tablet      multivitamin (THERAGRAN) tablet tablet Take  by mouth Every Night.      ondansetron (ZOFRAN) 4 MG tablet Take 1 tablet by mouth  "Every 6 (Six) Hours As Needed for Nausea or Vomiting. 30 tablet 0    ONE TOUCH ULTRA TEST test strip USE TO TEST BLOOD SUGAR THREE TIMES A DAY  1    Zinc 50 MG tablet Take 1 tablet by mouth Every Night.       No current facility-administered medications for this visit.       Review of Systems   Constitutional: Negative.    Cardiovascular:  Positive for leg swelling.   Skin:  Positive for wound.   Psychiatric/Behavioral: Negative.         OBJECTIVE    /88   Pulse 71   Ht 193 cm (75.98\")   Wt (!) 145 kg (320 lb)   SpO2 99%   BMI 38.97 kg/mý     Physical Exam  Cardiovascular:      Pulses:           Dorsalis pedis pulses are 2+ on the right side.        Posterior tibial pulses are 2+ on the right side.   Musculoskeletal:        Feet:    Feet:      Right foot:      Skin integrity: Ulcer present.   Neurological:      Mental Status: He is alert.   Psychiatric:         Mood and Affect: Mood normal.              Procedures        ASSESSMENT AND PLAN    Diagnoses and all orders for this visit:    1. Charcot's joint of left foot (Primary)  -     XR Ankle 3+ View Left  -     XR Foot 3+ View Left    2. Heel ulcer, right, with fat layer exposed        -New wound to right heel  -Imaging reviewed  - Performed sharp excisional debridement of all devitalized tissue to the level of subcutaneous tissue with a #15 blade to healthy bleeding borders.  Postdebridement wound measures 2.0 x 1.5 x 0.3 cm.  Wound dressed.  -Rx for custom toe filler for the right foot to provide increased balance and greater stability during ambulation.  Rx for left custom AFO which is needed to offload his Charcot deformity and stabilize the ankle joint.  These modifications will help prevent new tissue injuries due to patient's Charcot and diabetes.  -f/u 1 week           This document has been electronically signed by Marco A Thompson DPM on September 27, 2023 14:55 CDT     9/27/2023  14:55 CDT    "

## 2023-08-31 ENCOUNTER — INFUSION (OUTPATIENT)
Dept: ONCOLOGY | Facility: HOSPITAL | Age: 63
End: 2023-08-31
Payer: MEDICARE

## 2023-08-31 VITALS
SYSTOLIC BLOOD PRESSURE: 118 MMHG | HEART RATE: 76 BPM | TEMPERATURE: 96.9 F | RESPIRATION RATE: 18 BRPM | DIASTOLIC BLOOD PRESSURE: 72 MMHG

## 2023-08-31 DIAGNOSIS — M86.372 CHRONIC MULTIFOCAL OSTEOMYELITIS OF LEFT FOOT: Primary | ICD-10-CM

## 2023-08-31 PROCEDURE — 25010000002 ERTAPENEM PER 500 MG: Performed by: FAMILY MEDICINE

## 2023-08-31 RX ORDER — SODIUM CHLORIDE 9 MG/ML
250 INJECTION, SOLUTION INTRAVENOUS ONCE
Status: COMPLETED | OUTPATIENT
Start: 2023-08-31 | End: 2023-08-31

## 2023-08-31 RX ORDER — SODIUM CHLORIDE 9 MG/ML
250 INJECTION, SOLUTION INTRAVENOUS ONCE
OUTPATIENT
Start: 2023-08-31

## 2023-08-31 RX ADMIN — ERTAPENEM SODIUM 1000 MG: 1 INJECTION, POWDER, LYOPHILIZED, FOR SOLUTION INTRAMUSCULAR; INTRAVENOUS at 10:28

## 2023-08-31 RX ADMIN — SODIUM CHLORIDE 250 ML: 9 INJECTION, SOLUTION INTRAVENOUS at 10:27

## 2023-09-08 ENCOUNTER — OUTSIDE FACILITY SERVICE (OUTPATIENT)
Dept: PODIATRY | Facility: CLINIC | Age: 63
End: 2023-09-08
Payer: MEDICARE

## 2023-09-08 ENCOUNTER — OFFICE VISIT (OUTPATIENT)
Dept: WOUND CARE | Facility: HOSPITAL | Age: 63
End: 2023-09-08
Payer: MEDICARE

## 2023-09-08 PROCEDURE — 11042 DBRDMT SUBQ TIS 1ST 20SQCM/<: CPT | Performed by: PODIATRIST

## 2023-09-22 ENCOUNTER — OFFICE VISIT (OUTPATIENT)
Dept: WOUND CARE | Facility: HOSPITAL | Age: 63
End: 2023-09-22
Payer: MEDICARE

## 2023-09-22 ENCOUNTER — OUTSIDE FACILITY SERVICE (OUTPATIENT)
Dept: PODIATRY | Facility: CLINIC | Age: 63
End: 2023-09-22
Payer: MEDICARE

## 2023-09-22 PROCEDURE — 11042 DBRDMT SUBQ TIS 1ST 20SQCM/<: CPT | Performed by: PODIATRIST

## 2023-09-29 ENCOUNTER — OUTSIDE FACILITY SERVICE (OUTPATIENT)
Dept: PODIATRY | Facility: CLINIC | Age: 63
End: 2023-09-29
Payer: MEDICARE

## 2023-09-29 ENCOUNTER — OFFICE VISIT (OUTPATIENT)
Dept: WOUND CARE | Facility: HOSPITAL | Age: 63
End: 2023-09-29
Payer: MEDICARE

## 2023-12-13 NOTE — PROGRESS NOTES
Advance Care Planning   Healthcare Decision Maker:    Primary Decision Maker (Active): Jorge Alberto Rodriguez J - Spouse - [phone]    Click here to complete Healthcare Decision Makers including selection of the Healthcare Decision Maker Relationship (ie \"Primary\"). Today we documented Decision Maker(s) consistent with Legal Next of Kin hierarchy. Emre Lisa  1960  59 y.o. male   [place] - 3/29/2019  BS - 88 per pt    Patient presents today for a recheck of his left foot.    06/10/19  Chief Complaint   Patient presents with   • Left Foot - Follow-up       History of Present Illness    Patient presents to clinic for follow-up of his left foot ulcer.      Past Medical History:   Diagnosis Date   • Arthritis    • Atrial fibrillation (CMS/HCC)    • Diabetes mellitus (CMS/HCC)    • Diabetic foot ulcer associated with type 2 diabetes mellitus (CMS/HCC)     left great toe   • Hallux malleus of left foot    • Hammer toe    • Hypertension    • MI (myocardial infarction) (CMS/HCC)    • Neuropathy in diabetes (CMS/HCC)    • Onychomycosis          Past Surgical History:   Procedure Laterality Date   • AMPUTATION DIGIT Right 3/5/2017    Procedure: RIGHT AMPUTATION TRANSMETATRASAL , RECESSION GASTROCNEMOUS;  Surgeon: Marco A Thompson DPM;  Location: [facility];  Service:    • CARDIAC DEFIBRILLATOR PLACEMENT  2010, 2016   • CARPAL TUNNEL RELEASE  2015    elbow and wrist left arm   • FOOT IRRIGATION, DEBRIDEMENT AND REPAIR Left 3/7/2018    Procedure: FOOT IRRIGATION, DEBRIDEMENT AND REPAIR;  Surgeon: Marco A Thompson DPM;  Location: [facility];  Service:    • FOOT IRRIGATION, DEBRIDEMENT AND REPAIR Left 3/10/2018    Procedure: FOOT IRRIGATION, DEBRIDEMENT AND REPAIR;  Surgeon: Marco A Thompson DPM;  Location: [facility];  Service: Podiatry   • FOOT SURGERY     • FOOT WOUND CLOSURE Right 3/2/2017    Procedure: INCISION AND DRAINAGE, RIGHT FOOT;  Surgeon: Tung Rosenthal DPM;  Location: [facility] OR;  Service:    • HAMMER TOE REPAIR Left 2/15/2018    Procedure: HAMMERTOE CORRECTION LEFT SECOND, THIRD AND FOURTH TOES AND HALLUX INTERPHALANGEAL JOINT ARTHRODESIS LEFT FOOT (Micro Asnis, 4.0 & 5.0 Asnis)      (c-arm);  Surgeon: Marco A Thompson DPM;  Location: [facility];  Service:    • HARDWARE REMOVAL Left 3/5/2018    Procedure: ANKLE/FOOT HARDWARE REMOVAL;  Surgeon: Marco A  AMADO Thompson DPM;  Location: [facility];  Service:    • INCISION AND DRAINAGE LEG Left 3/5/2018    Procedure: INCISION AND DRAINAGE LOWER EXTREMITY;  Surgeon: Marco A Thompson DPM;  Location: [facility];  Service:    • TOE AMPUTATION Right 2016   • TONSILECTOMY, ADENOIDECTOMY, BILATERAL MYRINGOTOMY AND TUBES      age 25         Family History   Problem Relation Age of Onset   • Diabetes Father    • Stroke Father    • No Known Problems Maternal Grandmother    • No Known Problems Maternal Grandfather    • No Known Problems Paternal Grandmother    • No Known Problems Paternal Grandfather    • No Known Problems Son    • No Known Problems Daughter    • No Known Problems Daughter          Social History     Socioeconomic History   • Marital status:      Spouse name: Not on file   • Number of children: Not on file   • Years of education: Not on file   • Highest education level: Not on file   Tobacco Use   • Smoking status: Never Smoker   • Smokeless tobacco: Never Used   Substance and Sexual Activity   • Alcohol use: No   • Drug use: No   • Sexual activity: Defer         Current Outpatient Medications   Medication Sig Dispense Refill   • aspirin 81 MG chewable tablet Chew 81 mg Every Night.     • Bacitracin Zinc (MAGIC BUTT OINTMENT) Apply 1 each topically 2 (Two) Times a Day. 120 g 1   • cefdinir (OMNICEF) 300 MG capsule      • Cholecalciferol (VITAMIN D3) 5000 UNITS capsule capsule Take 5,000 Units by mouth Every Night.     • CLONIDINE HCL 5 MCG/ML ORAL SOLN Take  by mouth.     • Cyanocobalamin (VITAMIN B 12 PO) Take 1,000 mcg by mouth Every Night.     • doxycycline (VIBRAMYCIN) 100 MG capsule Take 1 capsule by mouth 2 (Two) Times a Day. 28 capsule 0   • dronedarone (MULTAQ) 400 MG tablet Take 400 mg by mouth Every Night.     • fluticasone (FLONASE) 50 MCG/ACT nasal spray 2 sprays into each nostril As Needed for Rhinitis.     • glimepiride (AMARYL) 2 MG tablet Take 4 mg by mouth 2 (Two) Times a Day.     • glimepiride  "(AMARYL) 2 MG tablet TAKE 1 TABLET BY MOUTH 2 (TWO) TIMES DAILY WITH FOOD     • HYDROcodone-acetaminophen (NORCO) 7.5-325 MG per tablet Take 1 tablet by mouth Every 6 (Six) Hours As Needed for Moderate Pain . 30 tablet 0   • losartan (COZAAR) 25 MG tablet      • magnesium oxide (MAGOX) 400 (241.3 Mg) MG tablet tablet Take 400 mg by mouth 2 (Two) Times a Day.     • metFORMIN (GLUCOPHAGE) 1000 MG tablet Take 1,000 mg by mouth 2 (Two) Times a Day With Meals. Patient states he only uses when needed     • metFORMIN (GLUCOPHAGE) 1000 MG tablet TAKE 1 TABLET BY MOUTH 2 (TWO) TIMES DAILY WITH MEALS. INDICATIONS: TYPE 2 DIABETES MELLITUS     • metoprolol succinate XL (TOPROL-XL) 25 MG 24 hr tablet      • metoprolol tartrate (LOPRESSOR) 25 MG tablet Take 12.5 mg by mouth Every Night.     • ONE TOUCH ULTRA TEST test strip USE TO TEST BLOOD SUGAR THREE TIMES A DAY  1   • valsartan (DIOVAN) 160 MG tablet Take 40 mg by mouth Every Night. 1/2 tablet daily      • valsartan-hydrochlorothiazide (DIOVAN-HCT) 160-12.5 MG per tablet Take  by mouth.     • vitamin C (ASCORBIC ACID) 500 MG tablet Take 500 mg by mouth Every Night.     • vitamin E 200 UNIT capsule Take 200 Units by mouth Every Night.       No current facility-administered medications for this visit.            OBJECTIVE    Pulse 94   Ht 190.5 cm (75\")   Wt (!) 142 kg (313 lb)   SpO2 99%   BMI 39.12 kg/m²     Review of Systems   Constitutional: Negative.    HENT: Negative.    Eyes: Negative.    Respiratory: Negative.    Cardiovascular: Negative.    Gastrointestinal: Negative.    Endocrine: Negative.    Genitourinary: Negative.    Musculoskeletal: Negative.    Skin: Positive for wound.   Neurological: Positive for numbness.             Physical Exam   Constitutional: He is oriented to person, place, and time. He appears well-developed and well-nourished. No distress.   HENT:   Head: Normocephalic and atraumatic.   Pulmonary/Chest: Effort normal. No respiratory distress. He " Statement Selected has no wheezes.   Neurological: He is alert and oriented to person, place, and time.   Skin: Skin is warm and dry. Capillary refill takes less than 2 seconds.   Psychiatric: He has a normal mood and affect. Judgment normal.   Vitals reviewed.    Left Lower Extremity:     Cardiovascular:    CFT brisk  to all digits  Skin temp is warm to warm from proximal tibia to distal digits  Pedal hair growth diminished.   No erythema or edema noted      Musculoskeletal:  Left hallux slightly abducted  Digits 2 through 5 left are rectus     Dermatological:   Toenails 1 through 5 are thickened, discolored   No subcutaneous nodules or masses noted    Ulcer to plantar lateral left foot  Measures 0.8 x 0.5 x 0.1 cm.  Base is fibrotic and granular.  No signs of infection.  Does not probe to bone.    Neurological:   Protective sensation absent        Procedures        ASSESSMENT AND PLAN    Emre was seen today for follow-up.    Diagnoses and all orders for this visit:    Diabetic ulcer of left midfoot associated with type 2 diabetes mellitus, with fat layer exposed (CMS/HCC)    Charcot's joint of left foot      -Ulcer to plantar lateral left foot has reopened.  Excisional debridement performed down to and including subcutaneous tissue with a dermal curette.  Post debridement measurements are 1.0 x 0.6 x 0.1 cm.  Ulcer was dressed.  Patient instructed on daily dressing changes.  Weightbearing as tolerated in boot only.  - All his questions were answered  - Return to clinic 1 week          This document has been electronically signed by Marco A Thompson DPM on Miriam 10, 2019 1:26 PM

## 2024-10-24 NOTE — MR AVS SNAPSHOT
Emre Lisa   1/18/2017 8:15 AM   Office Visit    Dept Phone:  [phone]   Encounter #:  [id number]    Provider:  Marco A Thompson DPM   Department:  [facility] PODIATRY                Your Full Care Plan              Where to Get Your Medications      These medications were sent to [facility]/pharmacy #[id number] - [place], [place] - [address] - [phone]  - [phone] [address] [address]     Phone:  [phone]     cephalexin 500 MG capsule            Your Updated Medication List          This list is accurate as of: 1/18/17  8:55 AM.  Always use your most recent med list.                cephalexin 500 MG capsule   Commonly known as:  KEFLEX   Take 1 capsule by mouth 4 (Four) Times a Day.       clindamycin 300 MG capsule   Commonly known as:  CLEOCIN   Take 2 capsules by mouth 3 (Three) Times a Day.       doxycycline 100 MG capsule   Commonly known as:  VIBRAMYCIN   Take 1 capsule by mouth 2 (Two) Times a Day.       fluticasone 50 MCG/ACT nasal spray   Commonly known as:  FLONASE       FLUVIRIN 0.5 ML suspension prefilled syringe injection   Generic drug:  Influenza Vac Typ A&B Surf Ant       glimepiride 2 MG tablet   Commonly known as:  AMARYL       magnesium oxide 400 (241.3 MG) MG tablet tablet   Commonly known as:  MAGOX       metFORMIN 1000 MG tablet   Commonly known as:  GLUCOPHAGE       metoprolol tartrate 25 MG tablet   Commonly known as:  LOPRESSOR       MULTAQ 400 MG tablet   Generic drug:  dronedarone       mupirocin 2 % ointment   Commonly known as:  BACTROBAN       ONE TOUCH ULTRA TEST test strip   Generic drug:  glucose blood       valsartan 160 MG tablet   Commonly known as:  DIOVAN               You Were Diagnosed With        Codes Comments    Chronic ulcer of great toe of left foot with fat layer exposed    -  Primary ICD-10-CM: L97.522  ICD-9-CM: 707.15     Hallux valgus, right     ICD-10-CM: M20.11  ICD-9-CM: 735.0       Symptoms    Describe your symptoms: TIM Lara from [employer] called to reported vitals from today's visit.    Pulse: 110  Denied and no concerning symptoms.  It is the policy of the home care setting to report any vitals out of the parameter.    TIM Lara - [employer].   [phone]        "Hallux hammertoe, left     ICD-10-CM: M20.32  ICD-9-CM: 735.3     Hammer toe of left foot     ICD-10-CM: M20.42  ICD-9-CM: 735.4     Cellulitis of left leg     ICD-10-CM: L03.116  ICD-9-CM: 682.6       Instructions     None    Patient Instructions History      Upcoming Appointments     Visit Type Date Time Department    POST-OP 2017  8:15 AM [facility]    FOLLOW UP 2017  4:00 PM [facility]      MyChart Signup     [facility] [employer]CAT allows you to send messages to your doctor, view your test results, renew your prescriptions, schedule appointments, and more. To sign up, go to [employer] and click on the Sign Up Now link in the New User? box. Enter your [employer] Activation Code exactly as it appears below along with the last four digits of your Social Security Number and your Date of Birth () to complete the sign-up process. If you do not sign up before the expiration date, you must request a new code.    NORCAT Activation Code: [email or web address]  Expires: 2017  8:55 AM    If you have questions, you can email [email or web address] or call [phone] to talk to our [employer]CAT staff. Remember, [employer]CAT is NOT to be used for urgent needs. For medical emergencies, dial 911.               Other Info from Your Visit           Your Appointments     2017  4:00 PM CST   Follow Up with Marco A Thompson DPM   [facility] (--)    [address]  [facility] [address] [place] [id number] [phone]           Arrive 15 minutes prior to appointment.              Allergies     Januvia [Sitagliptin]      Lipitor [Atorvastatin]      Peanut-containing Drug Products      Penicillins      Sulfa Antibiotics        Reason for Visit     Left Foot - Follow-up, Wound Check     Right Foot - Follow-up, Post-op           Vital Signs     Height Weight Body Mass Index Smoking Status          76\" (193 cm) 270 lb (122 kg) 32.87 kg/m2 Never " Smoker        Problems and Diagnoses Noted     Chronic ulcer of great toe of left foot with fat layer exposed    -  Primary    Bunion        Hammertoe        Hammer toe of left foot        Cellulitis of left leg

## (undated) DEVICE — K-WIRE
Type: IMPLANTABLE DEVICE | Site: TOES | Status: NON-FUNCTIONAL
Brand: ASNIS
Removed: 2018-02-15

## (undated) DEVICE — SPNG GZ WOVN 4X4IN 12PLY 10/BX STRL

## (undated) DEVICE — DRSNG WND GZ CURAD OIL EMULSION 3X16IN LF STRL

## (undated) DEVICE — PATIENT RETURN ELECTRODE, SINGLE-USE, CONTACT QUALITY MONITORING, ADULT, WITH 9FT CORD, FOR PATIENTS WEIGING OVER 33LBS. (15KG): Brand: MEGADYNE

## (undated) DEVICE — GOWN,PREVENTION PLUS,XLNG/XXLARGE,STRL: Brand: MEDLINE

## (undated) DEVICE — SYR 10ML

## (undated) DEVICE — SHEET, DRAPE, SPLIT, STERILE: Brand: MEDLINE

## (undated) DEVICE — STERILE POLYISOPRENE POWDER-FREE SURGICAL GLOVES WITH EMOLLIENT COATING: Brand: PROTEXIS

## (undated) DEVICE — PAD,ABDOMINAL,8"X10",ST,LF: Brand: MEDLINE

## (undated) DEVICE — NAIL DRILL: Brand: VALOR

## (undated) DEVICE — NDL HYPO ECLPS SFTY 25G 1 1/2IN

## (undated) DEVICE — SUT ETHLN 4/0 FS2 18IN 662H

## (undated) DEVICE — DISPOSABLE TOURNIQUET CUFF SINGLE BLADDER, DUAL PORT AND QUICK CONNECT CONNECTOR: Brand: COLOR CUFF

## (undated) DEVICE — WEBRIL COTTON UNDERCAST PADDING: Brand: WEBRIL

## (undated) DEVICE — SOL IRR NACL 0.9PCT 3000ML

## (undated) DEVICE — SUT PROLN 3/0 RB1 D/A 36IN 8558H

## (undated) DEVICE — POSTN ELEV LEG PROCARE FM UNIV 45DEG 31.5X10IN

## (undated) DEVICE — SOL IRR NACL 0.9PCT BT 1000ML

## (undated) DEVICE — GLV SURG SIGNATURE ESSENTIAL PF LTX SZ8

## (undated) DEVICE — SYRINGE, LUER LOCK, 10ML: Brand: MEDLINE

## (undated) DEVICE — PRECISION THIN (9.0 X 0.38 X 31.0MM)

## (undated) DEVICE — CVR FLUOROSCOPE C/ARM W/TP 36X28IN

## (undated) DEVICE — SHEET,DRAPE,53X77,STERILE: Brand: MEDLINE

## (undated) DEVICE — GLV SURG TRIUMPH ORTHO W/ALOE PF LTX 6.5 STRL

## (undated) DEVICE — SUT VIC 3/0 SH 27IN J416H

## (undated) DEVICE — UNDRPD BREATH 23X36 BG/10

## (undated) DEVICE — PCH SURG STRL INST SLF/SEAL 7.5X13IN

## (undated) DEVICE — GAUZE,PACKING STRIP,IODOFORM,1/2"X5YD,ST: Brand: CURAD

## (undated) DEVICE — BNDG ELAS CO-FLEX SLF ADHR 3IN5YD LF2 STRL

## (undated) DEVICE — BNDG ELAS ELITE V/CLOSE 4IN 5YD LF STRL

## (undated) DEVICE — DISPOSABLE TOURNIQUET CUFF 30"X4", 2-LINE, WHITE, STERILE, 1EA/PK, 10PK/CS: Brand: ASP MEDICAL

## (undated) DEVICE — PREP SOL POVIDONE/IODINE BT 4OZ

## (undated) DEVICE — CVR SURG EQUIP BND RECTG 36X28

## (undated) DEVICE — GLV SURG SENSICARE PI ORTHO PF SZ7 LF STRL

## (undated) DEVICE — ADHS SKIN MASTISOL CAP 2OZ DISP

## (undated) DEVICE — DRAPE,U/ SHT,SPLIT,PLAS,STERIL: Brand: MEDLINE

## (undated) DEVICE — PAD UNDERCAST WYTEX 4IN 4YD LF STRL

## (undated) DEVICE — 3M™ STERI-STRIP™ REINFORCED ADHESIVE SKIN CLOSURES, R1547, 1/2 IN X 4 IN (12 MM X 100 MM), 6 STRIPS/ENVELOPE: Brand: 3M™ STERI-STRIP™

## (undated) DEVICE — SOL CHLORHEXIDINE 4% CHG GLUCONATE 4OZ

## (undated) DEVICE — Device

## (undated) DEVICE — GLV SURG SENSICARE GREEN W/ALOE PF LF 8 STRL

## (undated) DEVICE — PAD UNDERCAST WYTEX 6IN 4YD LF STRL

## (undated) DEVICE — GOWN,NON-REINFORCED,4XL: Brand: MEDLINE

## (undated) DEVICE — DRSNG GZ CURAD XEROFORM NONADHR OVERWRAP 5X9IN

## (undated) DEVICE — PK POD 60

## (undated) DEVICE — DRSNG GZ CURAD XEROFORM NONADHS 5X9IN STRL

## (undated) DEVICE — 450 ML BOTTLE OF 0.05% CHLORHEXIDINE GLUCONATE IN 99.95% STERILE WATER FOR IRRIGATION, USP AND APPLICATOR.: Brand: IRRISEPT ANTIMICROBIAL WOUND LAVAGE

## (undated) DEVICE — GLV SURG SENSICARE POLYISPRN W/ALOE PF LF 6.5 GRN STRL

## (undated) DEVICE — GLV SURG SENSICARE GREEN W/ALOE PF LF 8.5 STRL

## (undated) DEVICE — LARGE TEAR CROSS CUT RASP (14.0 X 7.0MM)

## (undated) DEVICE — DRSNG WND GZ CURAD OIL EMULSION 3X3IN STRL

## (undated) DEVICE — DRSNG WND GZ CURAD OIL EMULSION 3X8IN STRL PK/3

## (undated) DEVICE — GOWN,AURORA,NOREINF,RAGLAN,XL,STERILE: Brand: MEDLINE

## (undated) DEVICE — BANDAGE,GAUZE,BULKEE II,4.5"X4.1YD,STRL: Brand: MEDLINE

## (undated) DEVICE — GLV SURG SENSICARE GREEN W/ALOE PF LF 6.5 STRL

## (undated) DEVICE — GLV SURG SENSICARE GREEN W/ALOE PF LF 7 STRL

## (undated) DEVICE — GAUZE,SPONGE,4"X4",16PLY,XRAY,STRL,LF: Brand: MEDLINE

## (undated) DEVICE — GLV SURG ORTHO BIOGEL OPTIFIT PF LTX SZ8

## (undated) DEVICE — BNDG ELAS CO-FLEX SLF ADHR 6IN 5YD LF STRL

## (undated) DEVICE — CONTAINER,SPECIMEN,OR STERILE,4OZ: Brand: MEDLINE

## (undated) DEVICE — ANTIBACTERIAL UNDYED BRAIDED (POLYGLACTIN 910), SYNTHETIC ABSORBABLE SUTURE: Brand: COATED VICRYL

## (undated) DEVICE — GLV SURG TRIUMPH LT PF LTX 7.5 STRL

## (undated) DEVICE — SPNG LAP 18X18IN LF STRL PK/5

## (undated) DEVICE — DRSNG SURESITE WNDW 4X4.5

## (undated) DEVICE — BANDAGE,GAUZE,CONFORMING,1"X75",STRL,LF: Brand: MEDLINE

## (undated) DEVICE — PRECISION THIN (5.5 X 0.38 X 18.0MM)

## (undated) DEVICE — NAIL K-WIRE: Brand: VALOR

## (undated) DEVICE — STERILE SLEEVE: Brand: CONVERTORS

## (undated) DEVICE — BNDG GZ SOF-FORM CONFRM 3X75IN LF STRL

## (undated) DEVICE — GLV SURG SENSICARE ALOE LF PF SZ7.5 GRN

## (undated) DEVICE — RESERVOIR,SUCTION,100CC,SILICONE: Brand: MEDLINE

## (undated) DEVICE — GLV SURG SENSICARE GREEN W/ALOE PF LF 6 STRL

## (undated) DEVICE — APPL CHLORAPREP W/TINT 26ML ORNG

## (undated) DEVICE — STCKNT SPECIALIST ORTH COTN 4IN 25YD

## (undated) DEVICE — 1010 S-DRAPE TOWEL DRAPE 10/BX: Brand: STERI-DRAPE™

## (undated) DEVICE — STERILE POLYISOPRENE POWDER-FREE SURGICAL GLOVES: Brand: PROTEXIS

## (undated) DEVICE — GLV SURG SENSICARE PI ORTHO SZ6.5 LF STRL

## (undated) DEVICE — BNDG ELAS ELITE V/CLOSE 6IN 5YD LF STRL

## (undated) DEVICE — SUT ETHLN 3-0 FS118IN 663H

## (undated) DEVICE — CULT  ANAEROBIC

## (undated) DEVICE — TOWEL,OR,DSP,ST,BLUE,DLX,XR,4/PK,20PK/CS: Brand: MEDLINE

## (undated) DEVICE — DISPOSABLE BIPOLAR CODE, 12' (3.66 M): Brand: CONMED

## (undated) DEVICE — SUT PROLN 2/0 8685H

## (undated) DEVICE — SOL ANTISEP SCRB CHG 4PCT 4OZ

## (undated) DEVICE — SUT PROLN 3/0 SH D/A 36IN 8522H

## (undated) DEVICE — NAIL BEADED GUIDE WIRE: Brand: VALOR

## (undated) DEVICE — GLV SURG TRIUMPH NATURAL W/ALOE PF LTX 7.5 STRL

## (undated) DEVICE — BNDG ELAS CO-FLEX SLF ADHR 4IN5YD LF STRL

## (undated) DEVICE — CYSTO/BLADDER IRRIGATION SET, REGULATING CLAMP

## (undated) DEVICE — GLV SURG SIGNATURE ESSENTIAL PF LTX SZ7

## (undated) DEVICE — SUT ETHLN 3/0 FS1 663G

## (undated) DEVICE — INTENDED FOR TISSUE SEPARATION, AND OTHER PROCEDURES THAT REQUIRE A SHARP SURGICAL BLADE TO PUNCTURE OR CUT.: Brand: BARD-PARKER ® CARBON RIB-BACK BLADES

## (undated) DEVICE — SUT ETHLN 2/0 FS 18IN 664H

## (undated) DEVICE — SPLNT PLSTR CAST 5IN

## (undated) DEVICE — PROXIMATE SKIN STAPLERS (35 WIDE) CONTAINS 35 STAINLESS STEEL STAPLES (FIXED HEAD): Brand: PROXIMATE

## (undated) DEVICE — COVER,MAYO STAND,STERILE: Brand: MEDLINE

## (undated) DEVICE — GLV SURG TRIUMPH ORTHO W/ALOE PF LTX 7.5 STRL

## (undated) DEVICE — CULT SWAB COLLECT AND TRANSPORT SYS SGL SP/50